# Patient Record
Sex: FEMALE | Race: WHITE | NOT HISPANIC OR LATINO
[De-identification: names, ages, dates, MRNs, and addresses within clinical notes are randomized per-mention and may not be internally consistent; named-entity substitution may affect disease eponyms.]

---

## 2016-09-15 RX ORDER — FUROSEMIDE 40 MG
0 TABLET ORAL
Qty: 0 | Refills: 0 | DISCHARGE
Start: 2016-09-15

## 2017-01-03 ENCOUNTER — APPOINTMENT (OUTPATIENT)
Dept: INTERNAL MEDICINE | Facility: CLINIC | Age: 77
End: 2017-01-03

## 2017-01-03 ENCOUNTER — LABORATORY RESULT (OUTPATIENT)
Age: 77
End: 2017-01-03

## 2017-01-03 ENCOUNTER — APPOINTMENT (OUTPATIENT)
Dept: CARDIOLOGY | Facility: CLINIC | Age: 77
End: 2017-01-03

## 2017-01-03 ENCOUNTER — NON-APPOINTMENT (OUTPATIENT)
Age: 77
End: 2017-01-03

## 2017-01-03 VITALS
OXYGEN SATURATION: 98 % | SYSTOLIC BLOOD PRESSURE: 100 MMHG | BODY MASS INDEX: 32.78 KG/M2 | HEIGHT: 66 IN | WEIGHT: 204 LBS | HEART RATE: 53 BPM | RESPIRATION RATE: 14 BRPM | DIASTOLIC BLOOD PRESSURE: 60 MMHG

## 2017-01-03 VITALS
DIASTOLIC BLOOD PRESSURE: 64 MMHG | HEART RATE: 52 BPM | OXYGEN SATURATION: 95 % | WEIGHT: 204.38 LBS | SYSTOLIC BLOOD PRESSURE: 103 MMHG | BODY MASS INDEX: 32.85 KG/M2 | HEIGHT: 66 IN

## 2017-01-03 RX ORDER — CLINDAMYCIN HYDROCHLORIDE 150 MG/1
150 CAPSULE ORAL
Qty: 28 | Refills: 0 | Status: DISCONTINUED | COMMUNITY
Start: 2016-12-30

## 2017-01-04 LAB
BASOPHILS # BLD AUTO: 0.12 K/UL
BASOPHILS NFR BLD AUTO: 1.8 %
EOSINOPHIL # BLD AUTO: 0 K/UL
EOSINOPHIL NFR BLD AUTO: 0 %
FOLATE SERPL-MCNC: >20 NG/ML
HCT VFR BLD CALC: 24.3 %
HGB BLD-MCNC: 7.6 G/DL
INR PPP: 2.2 RATIO
LYMPHOCYTES # BLD AUTO: 1.44 K/UL
LYMPHOCYTES NFR BLD AUTO: 21.2 %
MAN DIFF?: NORMAL
MCHC RBC-ENTMCNC: 31.3 GM/DL
MCHC RBC-ENTMCNC: 33 PG
MCV RBC AUTO: 105.7 FL
MONOCYTES # BLD AUTO: 0.24 K/UL
MONOCYTES NFR BLD AUTO: 3.5 %
NEUTROPHILS # BLD AUTO: 4.98 K/UL
NEUTROPHILS NFR BLD AUTO: 73.5 %
PLATELET # BLD AUTO: 309 K/UL
POCT-PROTHROMBIN TIME: 25 SECS
QUALITY CONTROL: YES
RBC # BLD: 2.3 M/UL
RBC # FLD: 22.4 %
VIT B12 SERPL-MCNC: 486 PG/ML
WBC # FLD AUTO: 6.78 K/UL

## 2017-01-05 RX ORDER — ACETAMINOPHEN 500 MG
650 TABLET ORAL ONCE
Qty: 0 | Refills: 0 | Status: COMPLETED | OUTPATIENT
Start: 2017-01-06 | End: 2017-01-06

## 2017-01-05 RX ORDER — DIPHENHYDRAMINE HCL 50 MG
25 CAPSULE ORAL ONCE
Qty: 0 | Refills: 0 | Status: COMPLETED | OUTPATIENT
Start: 2017-01-06 | End: 2017-01-06

## 2017-01-06 ENCOUNTER — OUTPATIENT (OUTPATIENT)
Dept: OUTPATIENT SERVICES | Facility: HOSPITAL | Age: 77
LOS: 1 days | End: 2017-01-06
Payer: MEDICARE

## 2017-01-06 DIAGNOSIS — Z98.89 OTHER SPECIFIED POSTPROCEDURAL STATES: Chronic | ICD-10-CM

## 2017-01-06 DIAGNOSIS — Z90.710 ACQUIRED ABSENCE OF BOTH CERVIX AND UTERUS: Chronic | ICD-10-CM

## 2017-01-06 DIAGNOSIS — D50.0 IRON DEFICIENCY ANEMIA SECONDARY TO BLOOD LOSS (CHRONIC): ICD-10-CM

## 2017-01-06 DIAGNOSIS — H26.40 UNSPECIFIED SECONDARY CATARACT: Chronic | ICD-10-CM

## 2017-01-06 LAB
BLD GP AB SCN SERPL QL: SIGNIFICANT CHANGE UP
HCT VFR BLD CALC: 22.7 % — LOW (ref 34.5–45)
HGB BLD-MCNC: 7.5 G/DL — LOW (ref 11.5–15.5)
MCHC RBC-ENTMCNC: 32.3 PG — SIGNIFICANT CHANGE UP (ref 27–34)
MCHC RBC-ENTMCNC: 32.9 GM/DL — SIGNIFICANT CHANGE UP (ref 32–36)
MCV RBC AUTO: 98.1 FL — SIGNIFICANT CHANGE UP (ref 80–100)
PLATELET # BLD AUTO: 235 K/UL — SIGNIFICANT CHANGE UP (ref 150–400)
RBC # BLD: 2.31 M/UL — LOW (ref 3.8–5.2)
RBC # FLD: 20.5 % — HIGH (ref 10.3–14.5)
WBC # BLD: 6.5 K/UL — SIGNIFICANT CHANGE UP (ref 3.8–10.5)
WBC # FLD AUTO: 6.5 K/UL — SIGNIFICANT CHANGE UP (ref 3.8–10.5)

## 2017-01-06 PROCEDURE — 86900 BLOOD TYPING SEROLOGIC ABO: CPT

## 2017-01-06 PROCEDURE — P9016: CPT

## 2017-01-06 PROCEDURE — 86920 COMPATIBILITY TEST SPIN: CPT

## 2017-01-06 PROCEDURE — 86850 RBC ANTIBODY SCREEN: CPT

## 2017-01-06 PROCEDURE — 86901 BLOOD TYPING SEROLOGIC RH(D): CPT

## 2017-01-06 PROCEDURE — 85027 COMPLETE CBC AUTOMATED: CPT

## 2017-01-06 PROCEDURE — 36430 TRANSFUSION BLD/BLD COMPNT: CPT

## 2017-01-06 RX ADMIN — Medication 25 MILLIGRAM(S): at 10:55

## 2017-01-06 RX ADMIN — Medication 650 MILLIGRAM(S): at 10:55

## 2017-01-09 ENCOUNTER — RECORD ABSTRACTING (OUTPATIENT)
Age: 77
End: 2017-01-09

## 2017-01-09 ENCOUNTER — OUTPATIENT (OUTPATIENT)
Dept: OUTPATIENT SERVICES | Facility: HOSPITAL | Age: 77
LOS: 1 days | End: 2017-01-09
Payer: MEDICARE

## 2017-01-09 DIAGNOSIS — Z98.89 OTHER SPECIFIED POSTPROCEDURAL STATES: Chronic | ICD-10-CM

## 2017-01-09 DIAGNOSIS — H26.40 UNSPECIFIED SECONDARY CATARACT: Chronic | ICD-10-CM

## 2017-01-09 DIAGNOSIS — Z90.710 ACQUIRED ABSENCE OF BOTH CERVIX AND UTERUS: Chronic | ICD-10-CM

## 2017-01-09 DIAGNOSIS — L89.612 PRESSURE ULCER OF RIGHT HEEL, STAGE 2: ICD-10-CM

## 2017-01-09 PROCEDURE — G0463: CPT

## 2017-01-09 RX ORDER — CEPHALEXIN 500 MG/1
500 CAPSULE ORAL
Qty: 30 | Refills: 0 | Status: DISCONTINUED | COMMUNITY
Start: 2016-12-31 | End: 2017-01-09

## 2017-01-09 RX ORDER — BACITRACIN 500 [USP'U]/G
500 OINTMENT OPHTHALMIC
Qty: 4 | Refills: 0 | Status: DISCONTINUED | COMMUNITY
Start: 2016-12-31 | End: 2017-01-09

## 2017-01-10 ENCOUNTER — MEDICATION RENEWAL (OUTPATIENT)
Age: 77
End: 2017-01-10

## 2017-01-11 DIAGNOSIS — E78.5 HYPERLIPIDEMIA, UNSPECIFIED: ICD-10-CM

## 2017-01-11 DIAGNOSIS — Z87.891 PERSONAL HISTORY OF NICOTINE DEPENDENCE: ICD-10-CM

## 2017-01-11 DIAGNOSIS — Z79.899 OTHER LONG TERM (CURRENT) DRUG THERAPY: ICD-10-CM
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
## 2017-01-11 DIAGNOSIS — Z80.0 FAMILY HISTORY OF MALIGNANT NEOPLASM OF DIGESTIVE ORGANS: ICD-10-CM

## 2017-01-11 DIAGNOSIS — Z79.82 LONG TERM (CURRENT) USE OF ASPIRIN: ICD-10-CM

## 2017-01-11 DIAGNOSIS — Z80.52 FAMILY HISTORY OF MALIGNANT NEOPLASM OF BLADDER: ICD-10-CM
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
## 2017-01-11 DIAGNOSIS — I25.10 ATHEROSCLEROTIC HEART DISEASE OF NATIVE CORONARY ARTERY WITHOUT ANGINA PECTORIS: ICD-10-CM

## 2017-01-11 DIAGNOSIS — Z98.41 CATARACT EXTRACTION STATUS, RIGHT EYE: ICD-10-CM
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
## 2017-01-11 DIAGNOSIS — K52.9 NONINFECTIVE GASTROENTERITIS AND COLITIS, UNSPECIFIED: ICD-10-CM

## 2017-01-11 DIAGNOSIS — I48.91 UNSPECIFIED ATRIAL FIBRILLATION: ICD-10-CM

## 2017-01-11 DIAGNOSIS — S81.811A LACERATION WITHOUT FOREIGN BODY, RIGHT LOWER LEG, INITIAL ENCOUNTER: ICD-10-CM

## 2017-01-11 DIAGNOSIS — J44.9 CHRONIC OBSTRUCTIVE PULMONARY DISEASE, UNSPECIFIED: ICD-10-CM

## 2017-01-11 DIAGNOSIS — E11.9 TYPE 2 DIABETES MELLITUS WITHOUT COMPLICATIONS: ICD-10-CM

## 2017-01-11 DIAGNOSIS — Z90.710 ACQUIRED ABSENCE OF BOTH CERVIX AND UTERUS: ICD-10-CM

## 2017-01-11 DIAGNOSIS — Z86.711 PERSONAL HISTORY OF PULMONARY EMBOLISM: ICD-10-CM

## 2017-01-11 DIAGNOSIS — Z98.1 ARTHRODESIS STATUS: ICD-10-CM

## 2017-01-11 DIAGNOSIS — E66.9 OBESITY, UNSPECIFIED: ICD-10-CM

## 2017-01-11 DIAGNOSIS — Z98.42 CATARACT EXTRACTION STATUS, LEFT EYE: ICD-10-CM

## 2017-01-11 DIAGNOSIS — Z86.718 PERSONAL HISTORY OF OTHER VENOUS THROMBOSIS AND EMBOLISM: ICD-10-CM

## 2017-01-11 DIAGNOSIS — K44.9 DIAPHRAGMATIC HERNIA WITHOUT OBSTRUCTION OR GANGRENE: ICD-10-CM

## 2017-01-11 DIAGNOSIS — H93.19 TINNITUS, UNSPECIFIED EAR: ICD-10-CM

## 2017-01-11 DIAGNOSIS — I89.0 LYMPHEDEMA, NOT ELSEWHERE CLASSIFIED: ICD-10-CM

## 2017-01-11 DIAGNOSIS — Z98.890 OTHER SPECIFIED POSTPROCEDURAL STATES: ICD-10-CM
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
## 2017-01-11 DIAGNOSIS — R60.9 EDEMA, UNSPECIFIED: ICD-10-CM

## 2017-01-11 DIAGNOSIS — L89.612 PRESSURE ULCER OF RIGHT HEEL, STAGE 2: ICD-10-CM

## 2017-01-16 ENCOUNTER — EMERGENCY (EMERGENCY)
Facility: HOSPITAL | Age: 77
LOS: 1 days | Discharge: HOME | End: 2017-01-16
Attending: EMERGENCY MEDICINE | Admitting: EMERGENCY MEDICINE
Payer: MEDICARE

## 2017-01-16 ENCOUNTER — APPOINTMENT (OUTPATIENT)
Dept: CARDIOLOGY | Facility: CLINIC | Age: 77
End: 2017-01-16

## 2017-01-16 ENCOUNTER — OUTPATIENT (OUTPATIENT)
Dept: OUTPATIENT SERVICES | Facility: HOSPITAL | Age: 77
LOS: 1 days | End: 2017-01-16
Payer: MEDICARE

## 2017-01-16 VITALS
WEIGHT: 195.11 LBS | RESPIRATION RATE: 18 BRPM | HEIGHT: 66 IN | DIASTOLIC BLOOD PRESSURE: 73 MMHG | SYSTOLIC BLOOD PRESSURE: 120 MMHG | HEART RATE: 49 BPM | OXYGEN SATURATION: 97 % | TEMPERATURE: 98 F

## 2017-01-16 VITALS
DIASTOLIC BLOOD PRESSURE: 74 MMHG | OXYGEN SATURATION: 98 % | TEMPERATURE: 98 F | RESPIRATION RATE: 16 BRPM | SYSTOLIC BLOOD PRESSURE: 122 MMHG | HEART RATE: 60 BPM

## 2017-01-16 DIAGNOSIS — Z98.89 OTHER SPECIFIED POSTPROCEDURAL STATES: Chronic | ICD-10-CM

## 2017-01-16 DIAGNOSIS — Z90.710 ACQUIRED ABSENCE OF BOTH CERVIX AND UTERUS: Chronic | ICD-10-CM

## 2017-01-16 DIAGNOSIS — R53.1 WEAKNESS: ICD-10-CM

## 2017-01-16 DIAGNOSIS — N28.9 DISORDER OF KIDNEY AND URETER, UNSPECIFIED: ICD-10-CM

## 2017-01-16 DIAGNOSIS — L89.612 PRESSURE ULCER OF RIGHT HEEL, STAGE 2: ICD-10-CM

## 2017-01-16 DIAGNOSIS — E11.9 TYPE 2 DIABETES MELLITUS WITHOUT COMPLICATIONS: ICD-10-CM

## 2017-01-16 DIAGNOSIS — J44.9 CHRONIC OBSTRUCTIVE PULMONARY DISEASE, UNSPECIFIED: ICD-10-CM

## 2017-01-16 DIAGNOSIS — H26.40 UNSPECIFIED SECONDARY CATARACT: Chronic | ICD-10-CM

## 2017-01-16 LAB
ALBUMIN SERPL ELPH-MCNC: 3.7 G/DL — SIGNIFICANT CHANGE UP (ref 3.3–5)
ALP SERPL-CCNC: 113 U/L — SIGNIFICANT CHANGE UP (ref 40–120)
ALT FLD-CCNC: 13 U/L — SIGNIFICANT CHANGE UP (ref 12–78)
AMYLASE P1 CFR SERPL: 52 U/L — SIGNIFICANT CHANGE UP (ref 25–115)
ANION GAP SERPL CALC-SCNC: 8 MMOL/L — SIGNIFICANT CHANGE UP (ref 5–17)
AST SERPL-CCNC: 10 U/L — LOW (ref 15–37)
BASOPHILS # BLD AUTO: 0.1 K/UL — SIGNIFICANT CHANGE UP (ref 0–0.2)
BASOPHILS NFR BLD AUTO: 2.1 % — HIGH (ref 0–2)
BILIRUB SERPL-MCNC: 0.4 MG/DL — SIGNIFICANT CHANGE UP (ref 0.2–1.2)
BUN SERPL-MCNC: 40 MG/DL — HIGH (ref 7–23)
CALCIUM SERPL-MCNC: 9.1 MG/DL — SIGNIFICANT CHANGE UP (ref 8.5–10.1)
CHLORIDE SERPL-SCNC: 107 MMOL/L — SIGNIFICANT CHANGE UP (ref 96–108)
CK MB CFR SERPL CALC: 0.8 NG/ML — SIGNIFICANT CHANGE UP (ref 0–3.6)
CO2 SERPL-SCNC: 25 MMOL/L — SIGNIFICANT CHANGE UP (ref 22–31)
CREAT SERPL-MCNC: 1.4 MG/DL — HIGH (ref 0.5–1.3)
EOSINOPHIL # BLD AUTO: 0 K/UL — SIGNIFICANT CHANGE UP (ref 0–0.5)
EOSINOPHIL NFR BLD AUTO: 0.6 % — SIGNIFICANT CHANGE UP (ref 0–6)
GLUCOSE SERPL-MCNC: 93 MG/DL — SIGNIFICANT CHANGE UP (ref 70–99)
HCT VFR BLD CALC: 31.2 % — LOW (ref 34.5–45)
HGB BLD-MCNC: 9.9 G/DL — LOW (ref 11.5–15.5)
INR PPP: 2.8 RATIO
LIDOCAIN IGE QN: 171 U/L — SIGNIFICANT CHANGE UP (ref 73–393)
LYMPHOCYTES # BLD AUTO: 1.8 K/UL — SIGNIFICANT CHANGE UP (ref 1–3.3)
LYMPHOCYTES # BLD AUTO: 28.4 % — SIGNIFICANT CHANGE UP (ref 13–44)
MCHC RBC-ENTMCNC: 31.5 PG — SIGNIFICANT CHANGE UP (ref 27–34)
MCHC RBC-ENTMCNC: 31.8 GM/DL — LOW (ref 32–36)
MCV RBC AUTO: 98.9 FL — SIGNIFICANT CHANGE UP (ref 80–100)
MONOCYTES # BLD AUTO: 0.7 K/UL — SIGNIFICANT CHANGE UP (ref 0–0.9)
MONOCYTES NFR BLD AUTO: 10.5 % — HIGH (ref 1–9)
NEUTROPHILS # BLD AUTO: 3.6 K/UL — SIGNIFICANT CHANGE UP (ref 1.8–7.4)
NEUTROPHILS NFR BLD AUTO: 58.5 % — SIGNIFICANT CHANGE UP (ref 43–77)
PLATELET # BLD AUTO: 211 K/UL — SIGNIFICANT CHANGE UP (ref 150–400)
POTASSIUM SERPL-MCNC: 4.7 MMOL/L — SIGNIFICANT CHANGE UP (ref 3.5–5.3)
POTASSIUM SERPL-SCNC: 4.7 MMOL/L — SIGNIFICANT CHANGE UP (ref 3.5–5.3)
PROT SERPL-MCNC: 6.5 G/DL — SIGNIFICANT CHANGE UP (ref 6–8.3)
RBC # BLD: 3.15 M/UL — LOW (ref 3.8–5.2)
RBC # FLD: 19.9 % — HIGH (ref 10.3–14.5)
SODIUM SERPL-SCNC: 140 MMOL/L — SIGNIFICANT CHANGE UP (ref 135–145)
TROPONIN I SERPL-MCNC: <.015 NG/ML — SIGNIFICANT CHANGE UP (ref 0.01–0.04)
WBC # BLD: 6.2 K/UL — SIGNIFICANT CHANGE UP (ref 3.8–10.5)
WBC # FLD AUTO: 6.2 K/UL — SIGNIFICANT CHANGE UP (ref 3.8–10.5)

## 2017-01-16 PROCEDURE — 71010: CPT | Mod: 26

## 2017-01-16 PROCEDURE — 99284 EMERGENCY DEPT VISIT MOD MDM: CPT

## 2017-01-16 PROCEDURE — 93010 ELECTROCARDIOGRAM REPORT: CPT

## 2017-01-16 PROCEDURE — 99285 EMERGENCY DEPT VISIT HI MDM: CPT

## 2017-01-16 NOTE — ED ADULT NURSE NOTE - PSH
After cataract  Left eye cataract surgically removed  S/P foot surgery    S/P hysterectomy    S/P knee surgery

## 2017-01-16 NOTE — ED PROVIDER NOTE - PLAN OF CARE
Return to the ED for any new or worsening symptoms  Take your medication as prescribed  Follow up with your PMD in 3-4 days   Follow up with Dr. Stout as scheduled

## 2017-01-16 NOTE — ED PROVIDER NOTE - CARE PLAN
Principal Discharge DX:	Weakness  Instructions for follow-up, activity and diet:	Return to the ED for any new or worsening symptoms  Take your medication as prescribed  Follow up with your PMD in 3-4 days   Follow up with Dr. Stout as scheduled  Secondary Diagnosis:	Renal insufficiency

## 2017-01-16 NOTE — ED PROVIDER NOTE - CHPI ED SYMPTOMS NEG
no vomiting/no dizziness/no fever/no cough/no diaphoresis/no nausea/no back pain/no chills/no syncope/no shortness of breath

## 2017-01-16 NOTE — ED PROVIDER NOTE - PMH
COPD (chronic obstructive pulmonary disease)    DM (diabetes mellitus)    GIB (gastrointestinal bleeding)    Hiatal hernia    PAF (paroxysmal atrial fibrillation)  s/p ablation  Pericarditis    Shoulder fracture, right

## 2017-01-16 NOTE — ED PROVIDER NOTE - OBJECTIVE STATEMENT
Pt is a 77 yo female who presents to the ED with a cc of weakness. Pt was at the wound care center for a chronic right lower ext wound. At the wound care center she was noted to have a low heart rate.  Pt was sent to the ED for further elv.  Reports that she has been experiencing lightheadedness since yesterday Pt is a 77 yo female who presents to the ED with a cc of weakness. Pt was at the wound care center for a chronic right lower ext wound. At the wound care center she was noted to have a low heart rate.  Pt was sent to the ED for further elv.  Reports that she has been experiencing lightheadedness since yesterday.  Denies fever, chills, N/V/D/C, CP, SOB, abd pain.  Pt does report mild exertional SOB but states that this is baseline for her

## 2017-01-17 DIAGNOSIS — Z98.1 ARTHRODESIS STATUS: ICD-10-CM

## 2017-01-17 DIAGNOSIS — R00.1 BRADYCARDIA, UNSPECIFIED: ICD-10-CM

## 2017-01-17 DIAGNOSIS — Z87.891 PERSONAL HISTORY OF NICOTINE DEPENDENCE: ICD-10-CM

## 2017-01-17 DIAGNOSIS — Z90.710 ACQUIRED ABSENCE OF BOTH CERVIX AND UTERUS: ICD-10-CM
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
## 2017-01-17 DIAGNOSIS — K44.9 DIAPHRAGMATIC HERNIA WITHOUT OBSTRUCTION OR GANGRENE: ICD-10-CM

## 2017-01-17 DIAGNOSIS — S81.811D LACERATION WITHOUT FOREIGN BODY, RIGHT LOWER LEG, SUBSEQUENT ENCOUNTER: ICD-10-CM

## 2017-01-17 DIAGNOSIS — Z79.899 OTHER LONG TERM (CURRENT) DRUG THERAPY: ICD-10-CM

## 2017-01-17 DIAGNOSIS — E78.5 HYPERLIPIDEMIA, UNSPECIFIED: ICD-10-CM

## 2017-01-17 DIAGNOSIS — Z98.890 OTHER SPECIFIED POSTPROCEDURAL STATES: ICD-10-CM

## 2017-01-17 DIAGNOSIS — H93.19 TINNITUS, UNSPECIFIED EAR: ICD-10-CM
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
## 2017-01-17 DIAGNOSIS — Z98.41 CATARACT EXTRACTION STATUS, RIGHT EYE: ICD-10-CM

## 2017-01-17 DIAGNOSIS — I89.0 LYMPHEDEMA, NOT ELSEWHERE CLASSIFIED: ICD-10-CM

## 2017-01-17 DIAGNOSIS — Z80.0 FAMILY HISTORY OF MALIGNANT NEOPLASM OF DIGESTIVE ORGANS: ICD-10-CM

## 2017-01-17 DIAGNOSIS — Z80.52 FAMILY HISTORY OF MALIGNANT NEOPLASM OF BLADDER: ICD-10-CM

## 2017-01-17 DIAGNOSIS — R60.9 EDEMA, UNSPECIFIED: ICD-10-CM

## 2017-01-17 DIAGNOSIS — E11.9 TYPE 2 DIABETES MELLITUS WITHOUT COMPLICATIONS: ICD-10-CM

## 2017-01-17 DIAGNOSIS — Z86.718 PERSONAL HISTORY OF OTHER VENOUS THROMBOSIS AND EMBOLISM: ICD-10-CM

## 2017-01-17 DIAGNOSIS — L89.612 PRESSURE ULCER OF RIGHT HEEL, STAGE 2: ICD-10-CM

## 2017-01-17 DIAGNOSIS — I48.91 UNSPECIFIED ATRIAL FIBRILLATION: ICD-10-CM

## 2017-01-17 DIAGNOSIS — Z98.42 CATARACT EXTRACTION STATUS, LEFT EYE: ICD-10-CM

## 2017-01-17 DIAGNOSIS — Z79.82 LONG TERM (CURRENT) USE OF ASPIRIN: ICD-10-CM

## 2017-01-17 DIAGNOSIS — Z86.711 PERSONAL HISTORY OF PULMONARY EMBOLISM: ICD-10-CM
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
## 2017-01-17 DIAGNOSIS — K52.9 NONINFECTIVE GASTROENTERITIS AND COLITIS, UNSPECIFIED: ICD-10-CM

## 2017-01-17 DIAGNOSIS — J44.9 CHRONIC OBSTRUCTIVE PULMONARY DISEASE, UNSPECIFIED: ICD-10-CM

## 2017-01-17 DIAGNOSIS — I25.10 ATHEROSCLEROTIC HEART DISEASE OF NATIVE CORONARY ARTERY WITHOUT ANGINA PECTORIS: ICD-10-CM

## 2017-01-17 DIAGNOSIS — E66.9 OBESITY, UNSPECIFIED: ICD-10-CM

## 2017-01-25 ENCOUNTER — OUTPATIENT (OUTPATIENT)
Dept: OUTPATIENT SERVICES | Facility: HOSPITAL | Age: 77
LOS: 1 days | End: 2017-01-25
Payer: MEDICARE

## 2017-01-25 DIAGNOSIS — Z98.89 OTHER SPECIFIED POSTPROCEDURAL STATES: Chronic | ICD-10-CM

## 2017-01-25 DIAGNOSIS — L89.612 PRESSURE ULCER OF RIGHT HEEL, STAGE 2: ICD-10-CM

## 2017-01-25 DIAGNOSIS — H26.40 UNSPECIFIED SECONDARY CATARACT: Chronic | ICD-10-CM

## 2017-01-25 DIAGNOSIS — Z90.710 ACQUIRED ABSENCE OF BOTH CERVIX AND UTERUS: Chronic | ICD-10-CM

## 2017-01-25 PROCEDURE — G0463: CPT

## 2017-01-28 DIAGNOSIS — Z80.0 FAMILY HISTORY OF MALIGNANT NEOPLASM OF DIGESTIVE ORGANS: ICD-10-CM

## 2017-01-28 DIAGNOSIS — I25.10 ATHEROSCLEROTIC HEART DISEASE OF NATIVE CORONARY ARTERY WITHOUT ANGINA PECTORIS: ICD-10-CM

## 2017-01-28 DIAGNOSIS — R60.9 EDEMA, UNSPECIFIED: ICD-10-CM

## 2017-01-28 DIAGNOSIS — Z98.890 OTHER SPECIFIED POSTPROCEDURAL STATES: ICD-10-CM

## 2017-01-28 DIAGNOSIS — E11.9 TYPE 2 DIABETES MELLITUS WITHOUT COMPLICATIONS: ICD-10-CM

## 2017-01-28 DIAGNOSIS — Z98.1 ARTHRODESIS STATUS: ICD-10-CM

## 2017-01-28 DIAGNOSIS — Z86.718 PERSONAL HISTORY OF OTHER VENOUS THROMBOSIS AND EMBOLISM: ICD-10-CM

## 2017-01-28 DIAGNOSIS — H93.19 TINNITUS, UNSPECIFIED EAR: ICD-10-CM

## 2017-01-28 DIAGNOSIS — Z86.711 PERSONAL HISTORY OF PULMONARY EMBOLISM: ICD-10-CM

## 2017-01-28 DIAGNOSIS — I48.91 UNSPECIFIED ATRIAL FIBRILLATION: ICD-10-CM

## 2017-01-28 DIAGNOSIS — L89.612 PRESSURE ULCER OF RIGHT HEEL, STAGE 2: ICD-10-CM
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
## 2017-01-28 DIAGNOSIS — E66.9 OBESITY, UNSPECIFIED: ICD-10-CM
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
## 2017-01-28 DIAGNOSIS — K44.9 DIAPHRAGMATIC HERNIA WITHOUT OBSTRUCTION OR GANGRENE: ICD-10-CM

## 2017-01-28 DIAGNOSIS — Z98.41 CATARACT EXTRACTION STATUS, RIGHT EYE: ICD-10-CM

## 2017-01-28 DIAGNOSIS — Z90.710 ACQUIRED ABSENCE OF BOTH CERVIX AND UTERUS: ICD-10-CM

## 2017-01-28 DIAGNOSIS — Z87.891 PERSONAL HISTORY OF NICOTINE DEPENDENCE: ICD-10-CM

## 2017-01-28 DIAGNOSIS — Z79.899 OTHER LONG TERM (CURRENT) DRUG THERAPY: ICD-10-CM

## 2017-01-28 DIAGNOSIS — E78.5 HYPERLIPIDEMIA, UNSPECIFIED: ICD-10-CM

## 2017-01-28 DIAGNOSIS — Z98.42 CATARACT EXTRACTION STATUS, LEFT EYE: ICD-10-CM

## 2017-01-28 DIAGNOSIS — I89.0 LYMPHEDEMA, NOT ELSEWHERE CLASSIFIED: ICD-10-CM

## 2017-01-28 DIAGNOSIS — Z79.82 LONG TERM (CURRENT) USE OF ASPIRIN: ICD-10-CM

## 2017-01-28 DIAGNOSIS — S81.811D LACERATION WITHOUT FOREIGN BODY, RIGHT LOWER LEG, SUBSEQUENT ENCOUNTER: ICD-10-CM
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
## 2017-01-28 DIAGNOSIS — J44.9 CHRONIC OBSTRUCTIVE PULMONARY DISEASE, UNSPECIFIED: ICD-10-CM

## 2017-01-28 DIAGNOSIS — Z80.52 FAMILY HISTORY OF MALIGNANT NEOPLASM OF BLADDER: ICD-10-CM

## 2017-01-28 DIAGNOSIS — K52.9 NONINFECTIVE GASTROENTERITIS AND COLITIS, UNSPECIFIED: ICD-10-CM

## 2017-01-31 ENCOUNTER — APPOINTMENT (OUTPATIENT)
Dept: INTERNAL MEDICINE | Facility: CLINIC | Age: 77
End: 2017-01-31

## 2017-01-31 VITALS
RESPIRATION RATE: 14 BRPM | TEMPERATURE: 98.1 F | DIASTOLIC BLOOD PRESSURE: 58 MMHG | SYSTOLIC BLOOD PRESSURE: 98 MMHG | OXYGEN SATURATION: 97 % | HEART RATE: 74 BPM | HEIGHT: 66 IN

## 2017-01-31 VITALS — SYSTOLIC BLOOD PRESSURE: 92 MMHG | DIASTOLIC BLOOD PRESSURE: 60 MMHG

## 2017-02-01 LAB
ALBUMIN SERPL ELPH-MCNC: 4.1 G/DL
ALP BLD-CCNC: 99 U/L
ALT SERPL-CCNC: 10 U/L
ANION GAP SERPL CALC-SCNC: 16 MMOL/L
AST SERPL-CCNC: 7 U/L
BILIRUB SERPL-MCNC: 0.4 MG/DL
BUN SERPL-MCNC: 35 MG/DL
CALCIUM SERPL-MCNC: 10.2 MG/DL
CHLORIDE SERPL-SCNC: 100 MMOL/L
CO2 SERPL-SCNC: 25 MMOL/L
CREAT SERPL-MCNC: 1.43 MG/DL
GLUCOSE SERPL-MCNC: 74 MG/DL
POTASSIUM SERPL-SCNC: 4.9 MMOL/L
PROT SERPL-MCNC: 6.2 G/DL
SODIUM SERPL-SCNC: 141 MMOL/L

## 2017-02-02 ENCOUNTER — APPOINTMENT (OUTPATIENT)
Dept: CARDIOLOGY | Facility: CLINIC | Age: 77
End: 2017-02-02

## 2017-02-02 ENCOUNTER — NON-APPOINTMENT (OUTPATIENT)
Age: 77
End: 2017-02-02

## 2017-02-02 VITALS
WEIGHT: 195 LBS | SYSTOLIC BLOOD PRESSURE: 107 MMHG | BODY MASS INDEX: 31.34 KG/M2 | HEART RATE: 47 BPM | DIASTOLIC BLOOD PRESSURE: 65 MMHG | HEIGHT: 66 IN | OXYGEN SATURATION: 99 %

## 2017-02-03 LAB — INR PPP: 3.2 RATIO

## 2017-02-14 ENCOUNTER — APPOINTMENT (OUTPATIENT)
Dept: INTERNAL MEDICINE | Facility: CLINIC | Age: 77
End: 2017-02-14

## 2017-02-14 ENCOUNTER — LABORATORY RESULT (OUTPATIENT)
Age: 77
End: 2017-02-14

## 2017-02-14 VITALS
OXYGEN SATURATION: 98 % | DIASTOLIC BLOOD PRESSURE: 50 MMHG | HEIGHT: 66 IN | TEMPERATURE: 98 F | SYSTOLIC BLOOD PRESSURE: 102 MMHG | RESPIRATION RATE: 14 BRPM | HEART RATE: 50 BPM

## 2017-02-14 LAB
INR PPP: 2.8 RATIO
POCT-PROTHROMBIN TIME: 18 SECS
QUALITY CONTROL: YES

## 2017-02-15 LAB
ANION GAP SERPL CALC-SCNC: 12 MMOL/L
BASOPHILS # BLD AUTO: 0.01 K/UL
BASOPHILS NFR BLD AUTO: 0.1 %
BUN SERPL-MCNC: 31 MG/DL
CALCIUM SERPL-MCNC: 9.7 MG/DL
CHLORIDE SERPL-SCNC: 101 MMOL/L
CO2 SERPL-SCNC: 26 MMOL/L
CREAT SERPL-MCNC: 1.33 MG/DL
EOSINOPHIL # BLD AUTO: 0.04 K/UL
EOSINOPHIL NFR BLD AUTO: 0.6 %
GLUCOSE SERPL-MCNC: 108 MG/DL
HCT VFR BLD CALC: 28.5 %
HGB BLD-MCNC: 8.8 G/DL
IMM GRANULOCYTES NFR BLD AUTO: 0.9 %
LYMPHOCYTES # BLD AUTO: 1.22 K/UL
LYMPHOCYTES NFR BLD AUTO: 17.5 %
MAN DIFF?: NORMAL
MCHC RBC-ENTMCNC: 30.9 GM/DL
MCHC RBC-ENTMCNC: 31.8 PG
MCV RBC AUTO: 102.9 FL
MONOCYTES # BLD AUTO: 0.82 K/UL
MONOCYTES NFR BLD AUTO: 11.7 %
NEUTROPHILS # BLD AUTO: 4.84 K/UL
NEUTROPHILS NFR BLD AUTO: 69.2 %
PLATELET # BLD AUTO: 225 K/UL
POTASSIUM SERPL-SCNC: 5.6 MMOL/L
RBC # BLD: 2.77 M/UL
RBC # FLD: 22 %
SODIUM SERPL-SCNC: 139 MMOL/L
WBC # FLD AUTO: 6.99 K/UL

## 2017-02-16 ENCOUNTER — APPOINTMENT (OUTPATIENT)
Dept: CARDIOLOGY | Facility: CLINIC | Age: 77
End: 2017-02-16

## 2017-02-17 ENCOUNTER — APPOINTMENT (OUTPATIENT)
Dept: CARDIOLOGY | Facility: CLINIC | Age: 77
End: 2017-02-17

## 2017-03-06 ENCOUNTER — MEDICATION RENEWAL (OUTPATIENT)
Age: 77
End: 2017-03-06

## 2017-03-06 ENCOUNTER — APPOINTMENT (OUTPATIENT)
Dept: CARDIOLOGY | Facility: CLINIC | Age: 77
End: 2017-03-06

## 2017-03-06 ENCOUNTER — NON-APPOINTMENT (OUTPATIENT)
Age: 77
End: 2017-03-06

## 2017-03-06 VITALS — BODY MASS INDEX: 32.14 KG/M2 | WEIGHT: 200 LBS | HEIGHT: 66 IN | HEART RATE: 56 BPM

## 2017-03-06 VITALS
HEIGHT: 66 IN | DIASTOLIC BLOOD PRESSURE: 63 MMHG | BODY MASS INDEX: 32.14 KG/M2 | WEIGHT: 200 LBS | HEART RATE: 56 BPM | OXYGEN SATURATION: 69 % | SYSTOLIC BLOOD PRESSURE: 123 MMHG

## 2017-03-06 DIAGNOSIS — R00.1 BRADYCARDIA, UNSPECIFIED: ICD-10-CM

## 2017-03-06 LAB
INR PPP: 2.1 RATIO
QUALITY CONTROL: YES

## 2017-03-09 ENCOUNTER — MEDICATION RENEWAL (OUTPATIENT)
Age: 77
End: 2017-03-09

## 2017-03-13 ENCOUNTER — APPOINTMENT (OUTPATIENT)
Dept: CARDIOLOGY | Facility: CLINIC | Age: 77
End: 2017-03-13

## 2017-03-13 VITALS — WEIGHT: 200 LBS | BODY MASS INDEX: 32.14 KG/M2 | HEIGHT: 66 IN | HEART RATE: 56 BPM

## 2017-03-13 LAB
INR PPP: 2.4 RATIO
QUALITY CONTROL: YES

## 2017-04-13 ENCOUNTER — APPOINTMENT (OUTPATIENT)
Dept: CARDIOLOGY | Facility: CLINIC | Age: 77
End: 2017-04-13

## 2017-04-13 ENCOUNTER — NON-APPOINTMENT (OUTPATIENT)
Age: 77
End: 2017-04-13

## 2017-04-13 VITALS
SYSTOLIC BLOOD PRESSURE: 131 MMHG | HEART RATE: 72 BPM | OXYGEN SATURATION: 95 % | DIASTOLIC BLOOD PRESSURE: 71 MMHG | HEIGHT: 66 IN

## 2017-04-13 VITALS — WEIGHT: 200 LBS | HEART RATE: 72 BPM | HEIGHT: 66 IN | BODY MASS INDEX: 32.14 KG/M2

## 2017-04-13 DIAGNOSIS — R06.02 SHORTNESS OF BREATH: ICD-10-CM

## 2017-04-13 LAB
INR PPP: 1.7 RATIO
QUALITY CONTROL: YES

## 2017-04-13 PROCEDURE — G0463: CPT

## 2017-04-13 PROCEDURE — 83690 ASSAY OF LIPASE: CPT

## 2017-04-13 PROCEDURE — 80053 COMPREHEN METABOLIC PANEL: CPT

## 2017-04-13 PROCEDURE — 82553 CREATINE MB FRACTION: CPT

## 2017-04-13 PROCEDURE — 84484 ASSAY OF TROPONIN QUANT: CPT

## 2017-04-13 PROCEDURE — 71045 X-RAY EXAM CHEST 1 VIEW: CPT

## 2017-04-13 PROCEDURE — 99284 EMERGENCY DEPT VISIT MOD MDM: CPT | Mod: 25

## 2017-04-13 PROCEDURE — 85027 COMPLETE CBC AUTOMATED: CPT

## 2017-04-13 PROCEDURE — 93005 ELECTROCARDIOGRAM TRACING: CPT

## 2017-04-13 PROCEDURE — 82150 ASSAY OF AMYLASE: CPT

## 2017-04-27 ENCOUNTER — APPOINTMENT (OUTPATIENT)
Dept: CARDIOLOGY | Facility: CLINIC | Age: 77
End: 2017-04-27

## 2017-04-27 VITALS — BODY MASS INDEX: 32.14 KG/M2 | HEART RATE: 72 BPM | HEIGHT: 66 IN | WEIGHT: 200 LBS

## 2017-04-27 LAB
INR PPP: 2.2 RATIO
QUALITY CONTROL: YES

## 2017-05-18 ENCOUNTER — APPOINTMENT (OUTPATIENT)
Dept: CARDIOLOGY | Facility: CLINIC | Age: 77
End: 2017-05-18

## 2017-05-18 VITALS — WEIGHT: 200 LBS | HEART RATE: 72 BPM | HEIGHT: 66 IN | BODY MASS INDEX: 32.14 KG/M2

## 2017-05-18 LAB
INR PPP: 2.6 RATIO
QUALITY CONTROL: YES

## 2017-05-25 ENCOUNTER — MEDICATION RENEWAL (OUTPATIENT)
Age: 77
End: 2017-05-25

## 2017-06-14 ENCOUNTER — APPOINTMENT (OUTPATIENT)
Dept: CARDIOLOGY | Facility: CLINIC | Age: 77
End: 2017-06-14

## 2017-06-14 ENCOUNTER — NON-APPOINTMENT (OUTPATIENT)
Age: 77
End: 2017-06-14

## 2017-06-14 VITALS
SYSTOLIC BLOOD PRESSURE: 149 MMHG | OXYGEN SATURATION: 96 % | BODY MASS INDEX: 31.82 KG/M2 | HEIGHT: 66 IN | WEIGHT: 198 LBS | DIASTOLIC BLOOD PRESSURE: 79 MMHG | HEART RATE: 72 BPM

## 2017-06-14 VITALS — HEART RATE: 72 BPM | BODY MASS INDEX: 31.82 KG/M2 | HEIGHT: 66 IN | WEIGHT: 198 LBS

## 2017-06-14 LAB
INR PPP: 2.2 RATIO
QUALITY CONTROL: YES

## 2017-06-14 RX ORDER — METOPROLOL TARTRATE 50 MG/1
50 TABLET, FILM COATED ORAL
Qty: 60 | Refills: 0 | Status: DISCONTINUED | COMMUNITY
Start: 2016-12-22

## 2017-06-14 RX ORDER — MUPIROCIN 20 MG/G
2 OINTMENT TOPICAL
Qty: 22 | Refills: 0 | Status: COMPLETED | COMMUNITY
Start: 2017-01-09

## 2017-06-15 ENCOUNTER — APPOINTMENT (OUTPATIENT)
Dept: CARDIOLOGY | Facility: CLINIC | Age: 77
End: 2017-06-15

## 2017-06-19 ENCOUNTER — OUTPATIENT (OUTPATIENT)
Dept: OUTPATIENT SERVICES | Facility: HOSPITAL | Age: 77
LOS: 1 days | End: 2017-06-19
Payer: MEDICARE

## 2017-06-19 ENCOUNTER — APPOINTMENT (OUTPATIENT)
Dept: CARDIOLOGY | Facility: CLINIC | Age: 77
End: 2017-06-19

## 2017-06-19 VITALS
DIASTOLIC BLOOD PRESSURE: 63 MMHG | WEIGHT: 195.11 LBS | TEMPERATURE: 98 F | SYSTOLIC BLOOD PRESSURE: 129 MMHG | HEART RATE: 63 BPM | RESPIRATION RATE: 16 BRPM | HEIGHT: 66 IN

## 2017-06-19 DIAGNOSIS — Z01.818 ENCOUNTER FOR OTHER PREPROCEDURAL EXAMINATION: ICD-10-CM

## 2017-06-19 DIAGNOSIS — G56.01 CARPAL TUNNEL SYNDROME, RIGHT UPPER LIMB: ICD-10-CM

## 2017-06-19 DIAGNOSIS — Z90.710 ACQUIRED ABSENCE OF BOTH CERVIX AND UTERUS: Chronic | ICD-10-CM

## 2017-06-19 DIAGNOSIS — Z95.828 PRESENCE OF OTHER VASCULAR IMPLANTS AND GRAFTS: Chronic | ICD-10-CM

## 2017-06-19 DIAGNOSIS — Z98.89 OTHER SPECIFIED POSTPROCEDURAL STATES: Chronic | ICD-10-CM

## 2017-06-19 DIAGNOSIS — H26.40 UNSPECIFIED SECONDARY CATARACT: Chronic | ICD-10-CM

## 2017-06-19 DIAGNOSIS — I82.409 ACUTE EMBOLISM AND THROMBOSIS OF UNSPECIFIED DEEP VEINS OF UNSPECIFIED LOWER EXTREMITY: ICD-10-CM

## 2017-06-19 DIAGNOSIS — S72.001A FRACTURE OF UNSPECIFIED PART OF NECK OF RIGHT FEMUR, INITIAL ENCOUNTER FOR CLOSED FRACTURE: Chronic | ICD-10-CM

## 2017-06-19 LAB
ANION GAP SERPL CALC-SCNC: 7 MMOL/L — SIGNIFICANT CHANGE UP (ref 5–17)
APTT BLD: 37.5 SEC — HIGH (ref 27.5–37.4)
BUN SERPL-MCNC: 28 MG/DL — HIGH (ref 7–23)
CALCIUM SERPL-MCNC: 9.6 MG/DL — SIGNIFICANT CHANGE UP (ref 8.5–10.1)
CHLORIDE SERPL-SCNC: 106 MMOL/L — SIGNIFICANT CHANGE UP (ref 96–108)
CO2 SERPL-SCNC: 27 MMOL/L — SIGNIFICANT CHANGE UP (ref 22–31)
CREAT SERPL-MCNC: 1.3 MG/DL — SIGNIFICANT CHANGE UP (ref 0.5–1.3)
GLUCOSE SERPL-MCNC: 102 MG/DL — HIGH (ref 70–99)
HBA1C BLD-MCNC: 5.4 % — SIGNIFICANT CHANGE UP (ref 4–5.6)
HCT VFR BLD CALC: 30.7 % — LOW (ref 34.5–45)
HGB BLD-MCNC: 9.7 G/DL — LOW (ref 11.5–15.5)
INR BLD: 2.16 RATIO — HIGH (ref 0.88–1.16)
MCHC RBC-ENTMCNC: 31.5 GM/DL — LOW (ref 32–36)
MCHC RBC-ENTMCNC: 32.2 PG — SIGNIFICANT CHANGE UP (ref 27–34)
MCV RBC AUTO: 102 FL — HIGH (ref 80–100)
PLATELET # BLD AUTO: 254 K/UL — SIGNIFICANT CHANGE UP (ref 150–400)
POTASSIUM SERPL-MCNC: 4.7 MMOL/L — SIGNIFICANT CHANGE UP (ref 3.5–5.3)
POTASSIUM SERPL-SCNC: 4.7 MMOL/L — SIGNIFICANT CHANGE UP (ref 3.5–5.3)
PROTHROM AB SERPL-ACNC: 23.9 SEC — HIGH (ref 9.8–12.7)
RBC # BLD: 3.01 M/UL — LOW (ref 3.8–5.2)
RBC # FLD: 20.6 % — HIGH (ref 10.3–14.5)
SODIUM SERPL-SCNC: 140 MMOL/L — SIGNIFICANT CHANGE UP (ref 135–145)
WBC # BLD: 6 K/UL — SIGNIFICANT CHANGE UP (ref 3.8–10.5)
WBC # FLD AUTO: 6 K/UL — SIGNIFICANT CHANGE UP (ref 3.8–10.5)

## 2017-06-19 PROCEDURE — 93010 ELECTROCARDIOGRAM REPORT: CPT | Mod: NC

## 2017-06-19 PROCEDURE — G0463: CPT

## 2017-06-19 PROCEDURE — 85610 PROTHROMBIN TIME: CPT

## 2017-06-19 PROCEDURE — 85730 THROMBOPLASTIN TIME PARTIAL: CPT

## 2017-06-19 PROCEDURE — 83036 HEMOGLOBIN GLYCOSYLATED A1C: CPT

## 2017-06-19 PROCEDURE — 93005 ELECTROCARDIOGRAM TRACING: CPT

## 2017-06-19 PROCEDURE — 80048 BASIC METABOLIC PNL TOTAL CA: CPT

## 2017-06-19 PROCEDURE — 85027 COMPLETE CBC AUTOMATED: CPT

## 2017-06-19 NOTE — H&P PST ADULT - PMH
COPD (chronic obstructive pulmonary disease)    DM (diabetes mellitus)    GIB (gastrointestinal bleeding)    Hiatal hernia    PAF (paroxysmal atrial fibrillation)  s/p ablation  Pericarditis    Pulmonary fibrosis    Shoulder fracture, right

## 2017-06-19 NOTE — H&P PST ADULT - MUSCULOSKELETAL COMMENTS
right hip pain, at times s/p ORIF in Nov 2016 no tenderness , pain on movement/flexion of the right wrist, previous carpal tunnel release scar

## 2017-06-19 NOTE — H&P PST ADULT - GASTROINTESTINAL COMMENTS
developed complications after the fall , had bowel obstruction, was on feeding tube, was admitted in the hospital for about 3 months in connection to right hip fracture , ORIF and complications.

## 2017-06-19 NOTE — H&P PST ADULT - PSH
After cataract  Left eye cataract surgically removed  Closed right hip fracture  rigth hip ORIF july 19 2016  S/P foot surgery    S/P hysterectomy    S/P IVC filter  nov 2016  S/P knee surgery After cataract  bilateral eye cataract surgically removed  Closed right hip fracture  rigth hip ORIF july 19 2016  S/P foot surgery    S/P hysterectomy    S/P IVC filter  nov 2016  S/P knee surgery

## 2017-06-19 NOTE — H&P PST ADULT - FAMILY HISTORY
Mother  Still living? Unknown  Family history of bladder cancer, Age at diagnosis: Age Unknown     Father  Still living? No  Family history of myocardial infarction, Age at diagnosis: Age Unknown

## 2017-06-19 NOTE — H&P PST ADULT - HISTORY OF PRESENT ILLNESS
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 Had Right carpal tunnel in 2008, continues to have pain in the right wrist, now scheduled fro revision of the right carpal tunnel. 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   Now in PST with c/o right wrist pain. Had Right carpal tunnel in 2008, started to have pain in the right wrist for over 6 months, scheduled for revision of the right carpal tunnel.

## 2017-06-19 NOTE — H&P PST ADULT - ASSESSMENT
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  In PST with right carpal tunnel, scheduled for right carpal tunnel release. Pre op testing today. Medical and cardiac eval advised.

## 2017-06-19 NOTE — H&P PST ADULT - NSANTHOSAYNRD_GEN_A_CORE
No. YONNY screening performed.  STOP BANG Legend: 0-2 = LOW Risk; 3-4 = INTERMEDIATE Risk; 5-8 = HIGH Risk

## 2017-06-20 ENCOUNTER — APPOINTMENT (OUTPATIENT)
Dept: INTERNAL MEDICINE | Facility: CLINIC | Age: 77
End: 2017-06-20

## 2017-06-20 VITALS
HEART RATE: 98 BPM | TEMPERATURE: 98.3 F | SYSTOLIC BLOOD PRESSURE: 102 MMHG | HEIGHT: 66 IN | RESPIRATION RATE: 14 BRPM | DIASTOLIC BLOOD PRESSURE: 80 MMHG | OXYGEN SATURATION: 95 %

## 2017-06-20 DIAGNOSIS — Z01.810 ENCOUNTER FOR PREPROCEDURAL CARDIOVASCULAR EXAMINATION: ICD-10-CM

## 2017-06-20 LAB — HBA1C MFR BLD HPLC: 5.2 %

## 2017-06-21 ENCOUNTER — APPOINTMENT (OUTPATIENT)
Dept: CARDIOLOGY | Facility: CLINIC | Age: 77
End: 2017-06-21

## 2017-06-21 LAB — INR PPP: 2.16

## 2017-06-23 ENCOUNTER — APPOINTMENT (OUTPATIENT)
Dept: CARDIOLOGY | Facility: CLINIC | Age: 77
End: 2017-06-23

## 2017-06-23 VITALS — HEIGHT: 66 IN | WEIGHT: 198 LBS | HEART RATE: 98 BPM | BODY MASS INDEX: 31.82 KG/M2

## 2017-06-23 LAB
INR PPP: 1.3 RATIO
QUALITY CONTROL: YES

## 2017-06-26 RX ORDER — SODIUM CHLORIDE 9 MG/ML
1000 INJECTION, SOLUTION INTRAVENOUS
Qty: 0 | Refills: 0 | Status: DISCONTINUED | OUTPATIENT
Start: 2017-06-27 | End: 2017-06-27

## 2017-06-27 ENCOUNTER — OUTPATIENT (OUTPATIENT)
Dept: OUTPATIENT SERVICES | Facility: HOSPITAL | Age: 77
LOS: 1 days | Discharge: HOME | End: 2017-06-27
Payer: MEDICARE

## 2017-06-27 ENCOUNTER — TRANSCRIPTION ENCOUNTER (OUTPATIENT)
Age: 77
End: 2017-06-27

## 2017-06-27 VITALS
HEIGHT: 66 IN | WEIGHT: 195.11 LBS | HEART RATE: 63 BPM | SYSTOLIC BLOOD PRESSURE: 119 MMHG | RESPIRATION RATE: 14 BRPM | OXYGEN SATURATION: 97 % | DIASTOLIC BLOOD PRESSURE: 48 MMHG | TEMPERATURE: 99 F

## 2017-06-27 VITALS
OXYGEN SATURATION: 96 % | HEART RATE: 60 BPM | RESPIRATION RATE: 12 BRPM | DIASTOLIC BLOOD PRESSURE: 54 MMHG | SYSTOLIC BLOOD PRESSURE: 110 MMHG | TEMPERATURE: 98 F

## 2017-06-27 DIAGNOSIS — Z98.89 OTHER SPECIFIED POSTPROCEDURAL STATES: Chronic | ICD-10-CM

## 2017-06-27 DIAGNOSIS — G56.01 CARPAL TUNNEL SYNDROME, RIGHT UPPER LIMB: ICD-10-CM

## 2017-06-27 DIAGNOSIS — S72.001A FRACTURE OF UNSPECIFIED PART OF NECK OF RIGHT FEMUR, INITIAL ENCOUNTER FOR CLOSED FRACTURE: Chronic | ICD-10-CM

## 2017-06-27 DIAGNOSIS — H26.40 UNSPECIFIED SECONDARY CATARACT: Chronic | ICD-10-CM

## 2017-06-27 DIAGNOSIS — Z90.710 ACQUIRED ABSENCE OF BOTH CERVIX AND UTERUS: Chronic | ICD-10-CM

## 2017-06-27 DIAGNOSIS — Z95.828 PRESENCE OF OTHER VASCULAR IMPLANTS AND GRAFTS: Chronic | ICD-10-CM

## 2017-06-27 LAB
APTT BLD: 38.3 SEC — HIGH (ref 27.5–37.4)
INR BLD: 1.17 RATIO — HIGH (ref 0.88–1.16)
PROTHROM AB SERPL-ACNC: 12.8 SEC — HIGH (ref 9.8–12.7)

## 2017-06-27 PROCEDURE — 36415 COLL VENOUS BLD VENIPUNCTURE: CPT

## 2017-06-27 PROCEDURE — 64721 CARPAL TUNNEL SURGERY: CPT | Mod: RT

## 2017-06-27 PROCEDURE — 85610 PROTHROMBIN TIME: CPT

## 2017-06-27 PROCEDURE — 85730 THROMBOPLASTIN TIME PARTIAL: CPT

## 2017-06-27 RX ORDER — OXYCODONE HYDROCHLORIDE 5 MG/1
5 TABLET ORAL EVERY 4 HOURS
Qty: 0 | Refills: 0 | Status: DISCONTINUED | OUTPATIENT
Start: 2017-06-27 | End: 2017-06-27

## 2017-06-27 RX ORDER — SODIUM CHLORIDE 9 MG/ML
1000 INJECTION, SOLUTION INTRAVENOUS
Qty: 0 | Refills: 0 | Status: DISCONTINUED | OUTPATIENT
Start: 2017-06-27 | End: 2017-06-27

## 2017-06-27 RX ORDER — ONDANSETRON 8 MG/1
4 TABLET, FILM COATED ORAL ONCE
Qty: 0 | Refills: 0 | Status: DISCONTINUED | OUTPATIENT
Start: 2017-06-27 | End: 2017-06-27

## 2017-06-27 RX ORDER — HYDROMORPHONE HYDROCHLORIDE 2 MG/ML
0.5 INJECTION INTRAMUSCULAR; INTRAVENOUS; SUBCUTANEOUS
Qty: 0 | Refills: 0 | Status: DISCONTINUED | OUTPATIENT
Start: 2017-06-27 | End: 2017-06-27

## 2017-06-27 RX ORDER — CEFAZOLIN SODIUM 1 G
2000 VIAL (EA) INJECTION ONCE
Qty: 0 | Refills: 0 | Status: COMPLETED | OUTPATIENT
Start: 2017-06-27 | End: 2017-06-27

## 2017-06-27 RX ADMIN — SODIUM CHLORIDE 75 MILLILITER(S): 9 INJECTION, SOLUTION INTRAVENOUS at 09:30

## 2017-06-27 RX ADMIN — HYDROMORPHONE HYDROCHLORIDE 0.5 MILLIGRAM(S): 2 INJECTION INTRAMUSCULAR; INTRAVENOUS; SUBCUTANEOUS at 09:47

## 2017-06-27 RX ADMIN — SODIUM CHLORIDE 60 MILLILITER(S): 9 INJECTION, SOLUTION INTRAVENOUS at 07:31

## 2017-06-27 RX ADMIN — HYDROMORPHONE HYDROCHLORIDE 0.5 MILLIGRAM(S): 2 INJECTION INTRAMUSCULAR; INTRAVENOUS; SUBCUTANEOUS at 09:32

## 2017-06-27 NOTE — ASU DISCHARGE PLAN (ADULT/PEDIATRIC). - ACTIVITY LEVEL
no heavy lifting/ice, active movement of fingers encouraged/elevate extremity/weight bearing as tolerated

## 2017-06-27 NOTE — ASU DISCHARGE PLAN (ADULT/PEDIATRIC). - MEDICATION SUMMARY - MEDICATIONS TO TAKE
I will START or STAY ON the medications listed below when I get home from the hospital:    Vicodin 5 mg-300 mg oral tablet  -- 1 tab(s) by mouth every 6 hours, As Needed for pain  -- Indication: For prn pain    amiodarone 100 mg oral tablet  --  by mouth   -- Indication: For per pmd    Lovenox 100 mg/mL injectable solution  --  injectable 2 times a day  -- Indication: For per pmd    Coumadin 3 mg oral tablet  -- 1 tab(s) by mouth once a day   4mg 5times /week and 6 mg 2times /week  -- Indication: For per pmd    simvastatin 20 mg oral tablet  -- 1 tab(s) by mouth once a day (at bedtime)  -- Indication: For per pmd    Spiriva 18 mcg inhalation capsule  -- 1 each inhaled once a day  -- Indication: For per pmd    Keflex 500 mg oral capsule  -- 1 cap(s) by mouth 4 times a day x3 days  -- Indication: For infxn ppx    furosemide 20 mg oral tablet  --  by mouth twice daily  -- Indication: For per pmd    Bentyl 10 mg oral capsule  -- 2 cap(s) by mouth 4 times a day, As Needed  -- Indication: For per pmd    ferrous sulfate (as elemental iron) 45 mg oral tablet, extended release  -- 1 tab(s) by mouth once a day  -- Indication: For per pmd    Singulair 10 mg oral tablet  -- 1 tab(s) by mouth once a day  -- Indication: For per pmd    fluticasone 27.5 mcg/inh nasal spray  -- 1 spray(s) into nose once a day  -- Indication: For per pmd    Prevacid 15 mg oral delayed release capsule  -- 1 cap(s) by mouth once a day  -- Indication: For per pmd    ascorbic acid 500 mg oral tablet  -- 1 tab(s) by mouth 2 times a day  -- Indication: For per pmd    folic acid 1 mg oral tablet  -- 1 tab(s) by mouth once a day  -- Indication: For per pmd    Vitamin D3 1000 intl units oral capsule  -- 1 cap(s) by mouth once a day  -- Indication: For per pmd

## 2017-06-27 NOTE — ASU PATIENT PROFILE, ADULT - PSH
After cataract  bilateral eye cataract surgically removed  Closed right hip fracture  rigth hip ORIF july 19 2016  S/P foot surgery    S/P hysterectomy    S/P IVC filter  nov 2016  S/P knee surgery

## 2017-06-27 NOTE — ASU DISCHARGE PLAN (ADULT/PEDIATRIC). - NOTIFY
Swelling that continues/Pain not relieved by Medications/Numbness, color, or temperature change to extremity/Numbness, tingling/Bleeding that does not stop/Fever greater than 101

## 2017-06-30 ENCOUNTER — APPOINTMENT (OUTPATIENT)
Dept: CARDIOLOGY | Facility: CLINIC | Age: 77
End: 2017-06-30

## 2017-07-01 LAB — INR PPP: 1.2 RATIO

## 2017-07-03 ENCOUNTER — APPOINTMENT (OUTPATIENT)
Dept: CARDIOLOGY | Facility: CLINIC | Age: 77
End: 2017-07-03

## 2017-07-03 LAB — INR PPP: 1.3 RATIO

## 2017-07-04 DIAGNOSIS — I34.1 NONRHEUMATIC MITRAL (VALVE) PROLAPSE: ICD-10-CM

## 2017-07-04 DIAGNOSIS — G56.01 CARPAL TUNNEL SYNDROME, RIGHT UPPER LIMB: ICD-10-CM

## 2017-07-04 DIAGNOSIS — Z80.0 FAMILY HISTORY OF MALIGNANT NEOPLASM OF DIGESTIVE ORGANS: ICD-10-CM

## 2017-07-04 DIAGNOSIS — Z79.82 LONG TERM (CURRENT) USE OF ASPIRIN: ICD-10-CM

## 2017-07-04 DIAGNOSIS — J44.9 CHRONIC OBSTRUCTIVE PULMONARY DISEASE, UNSPECIFIED: ICD-10-CM

## 2017-07-04 DIAGNOSIS — J84.10 PULMONARY FIBROSIS, UNSPECIFIED: ICD-10-CM

## 2017-07-04 DIAGNOSIS — Z80.52 FAMILY HISTORY OF MALIGNANT NEOPLASM OF BLADDER: ICD-10-CM

## 2017-07-04 DIAGNOSIS — I10 ESSENTIAL (PRIMARY) HYPERTENSION: ICD-10-CM

## 2017-07-04 DIAGNOSIS — Z87.891 PERSONAL HISTORY OF NICOTINE DEPENDENCE: ICD-10-CM

## 2017-07-04 DIAGNOSIS — E11.9 TYPE 2 DIABETES MELLITUS WITHOUT COMPLICATIONS: ICD-10-CM

## 2017-07-05 ENCOUNTER — MEDICATION RENEWAL (OUTPATIENT)
Age: 77
End: 2017-07-05

## 2017-07-06 ENCOUNTER — APPOINTMENT (OUTPATIENT)
Dept: CARDIOLOGY | Facility: CLINIC | Age: 77
End: 2017-07-06

## 2017-07-06 LAB — INR PPP: 1.4 RATIO

## 2017-07-10 ENCOUNTER — APPOINTMENT (OUTPATIENT)
Dept: CARDIOLOGY | Facility: CLINIC | Age: 77
End: 2017-07-10

## 2017-07-10 VITALS — HEIGHT: 66 IN | HEART RATE: 98 BPM | BODY MASS INDEX: 31.82 KG/M2 | WEIGHT: 198 LBS

## 2017-07-11 ENCOUNTER — LABORATORY RESULT (OUTPATIENT)
Age: 77
End: 2017-07-11

## 2017-07-11 ENCOUNTER — APPOINTMENT (OUTPATIENT)
Dept: INTERNAL MEDICINE | Facility: CLINIC | Age: 77
End: 2017-07-11

## 2017-07-11 VITALS
HEART RATE: 71 BPM | OXYGEN SATURATION: 97 % | SYSTOLIC BLOOD PRESSURE: 120 MMHG | RESPIRATION RATE: 14 BRPM | TEMPERATURE: 98.4 F | DIASTOLIC BLOOD PRESSURE: 78 MMHG | HEIGHT: 66 IN | WEIGHT: 190 LBS | BODY MASS INDEX: 30.53 KG/M2

## 2017-07-11 LAB
INR PPP: 1.6 RATIO
INR PPP: 1.6 RATIO
POCT-PROTHROMBIN TIME: 19.1 SECS
QUALITY CONTROL: YES
QUALITY CONTROL: YES

## 2017-07-12 LAB
ALBUMIN SERPL ELPH-MCNC: 4.5 G/DL
ALP BLD-CCNC: 85 U/L
ALT SERPL-CCNC: 10 U/L
ANION GAP SERPL CALC-SCNC: 19 MMOL/L
AST SERPL-CCNC: 14 U/L
BASOPHILS # BLD AUTO: 0 K/UL
BASOPHILS NFR BLD AUTO: 0 %
BILIRUB SERPL-MCNC: 0.4 MG/DL
BUN SERPL-MCNC: 26 MG/DL
CALCIUM SERPL-MCNC: 10.9 MG/DL
CHLORIDE SERPL-SCNC: 104 MMOL/L
CO2 SERPL-SCNC: 21 MMOL/L
CREAT SERPL-MCNC: 1.47 MG/DL
EOSINOPHIL # BLD AUTO: 0 K/UL
EOSINOPHIL NFR BLD AUTO: 0 %
GLUCOSE SERPL-MCNC: 85 MG/DL
HCT VFR BLD CALC: 30.9 %
HGB BLD-MCNC: 9.3 G/DL
LYMPHOCYTES # BLD AUTO: 1.4 K/UL
LYMPHOCYTES NFR BLD AUTO: 18.3 %
MAN DIFF?: NORMAL
MCHC RBC-ENTMCNC: 30.1 GM/DL
MCHC RBC-ENTMCNC: 31.8 PG
MCV RBC AUTO: 105.8 FL
MONOCYTES # BLD AUTO: 0.6 K/UL
MONOCYTES NFR BLD AUTO: 7.8 %
NEUTROPHILS # BLD AUTO: 5.65 K/UL
NEUTROPHILS NFR BLD AUTO: 73.9 %
PLATELET # BLD AUTO: 317 K/UL
POTASSIUM SERPL-SCNC: 5.9 MMOL/L
PROT SERPL-MCNC: 6.7 G/DL
RBC # BLD: 2.92 M/UL
RBC # FLD: 24.3 %
SODIUM SERPL-SCNC: 144 MMOL/L
WBC # FLD AUTO: 7.64 K/UL

## 2017-07-13 ENCOUNTER — APPOINTMENT (OUTPATIENT)
Dept: CARDIOLOGY | Facility: CLINIC | Age: 77
End: 2017-07-13

## 2017-07-13 VITALS — HEART RATE: 71 BPM | WEIGHT: 190 LBS | BODY MASS INDEX: 30.53 KG/M2 | HEIGHT: 66 IN

## 2017-07-13 LAB
INR PPP: 1.5 RATIO
QUALITY CONTROL: YES

## 2017-07-14 ENCOUNTER — RESULT REVIEW (OUTPATIENT)
Age: 77
End: 2017-07-14

## 2017-07-14 ENCOUNTER — TRANSCRIPTION ENCOUNTER (OUTPATIENT)
Age: 77
End: 2017-07-14

## 2017-07-14 ENCOUNTER — APPOINTMENT (OUTPATIENT)
Dept: INTERNAL MEDICINE | Facility: CLINIC | Age: 77
End: 2017-07-14

## 2017-07-14 ENCOUNTER — OUTPATIENT (OUTPATIENT)
Dept: OUTPATIENT SERVICES | Facility: HOSPITAL | Age: 77
LOS: 1 days | Discharge: HOME | End: 2017-07-14
Payer: MEDICARE

## 2017-07-14 VITALS
HEIGHT: 66 IN | WEIGHT: 195.11 LBS | RESPIRATION RATE: 14 BRPM | SYSTOLIC BLOOD PRESSURE: 129 MMHG | TEMPERATURE: 99 F | HEART RATE: 73 BPM | OXYGEN SATURATION: 96 % | DIASTOLIC BLOOD PRESSURE: 52 MMHG

## 2017-07-14 VITALS
OXYGEN SATURATION: 98 % | SYSTOLIC BLOOD PRESSURE: 116 MMHG | RESPIRATION RATE: 14 BRPM | HEART RATE: 76 BPM | TEMPERATURE: 99 F | DIASTOLIC BLOOD PRESSURE: 50 MMHG

## 2017-07-14 VITALS
TEMPERATURE: 98.5 F | BODY MASS INDEX: 30.53 KG/M2 | RESPIRATION RATE: 14 BRPM | OXYGEN SATURATION: 97 % | HEART RATE: 75 BPM | WEIGHT: 190 LBS | HEIGHT: 66 IN | DIASTOLIC BLOOD PRESSURE: 60 MMHG | SYSTOLIC BLOOD PRESSURE: 110 MMHG

## 2017-07-14 DIAGNOSIS — T14.8 OTHER INJURY OF UNSPECIFIED BODY REGION: ICD-10-CM

## 2017-07-14 DIAGNOSIS — S72.001A FRACTURE OF UNSPECIFIED PART OF NECK OF RIGHT FEMUR, INITIAL ENCOUNTER FOR CLOSED FRACTURE: Chronic | ICD-10-CM

## 2017-07-14 DIAGNOSIS — G56.01 CARPAL TUNNEL SYNDROME, RIGHT UPPER LIMB: ICD-10-CM

## 2017-07-14 DIAGNOSIS — Z95.828 PRESENCE OF OTHER VASCULAR IMPLANTS AND GRAFTS: Chronic | ICD-10-CM

## 2017-07-14 DIAGNOSIS — Z90.710 ACQUIRED ABSENCE OF BOTH CERVIX AND UTERUS: Chronic | ICD-10-CM

## 2017-07-14 DIAGNOSIS — H26.40 UNSPECIFIED SECONDARY CATARACT: Chronic | ICD-10-CM

## 2017-07-14 DIAGNOSIS — Z98.89 OTHER SPECIFIED POSTPROCEDURAL STATES: Chronic | ICD-10-CM

## 2017-07-14 DIAGNOSIS — Z98.890 OTHER SPECIFIED POSTPROCEDURAL STATES: Chronic | ICD-10-CM

## 2017-07-14 LAB
APTT BLD: 36.9 SEC — SIGNIFICANT CHANGE UP (ref 27.5–37.4)
INR BLD: 1.37 RATIO — HIGH (ref 0.88–1.16)
INR PPP: 1.2 RATIO
POCT-PROTHROMBIN TIME: 14.8 SECS
PROTHROM AB SERPL-ACNC: 15 SEC — HIGH (ref 9.8–12.7)
QUALITY CONTROL: YES

## 2017-07-14 PROCEDURE — 25028 I&D F/ARM&/WRST DP ABSC/HMTM: CPT | Mod: RT

## 2017-07-14 PROCEDURE — C1889: CPT

## 2017-07-14 PROCEDURE — 88304 TISSUE EXAM BY PATHOLOGIST: CPT | Mod: 26

## 2017-07-14 PROCEDURE — 85610 PROTHROMBIN TIME: CPT

## 2017-07-14 PROCEDURE — 36415 COLL VENOUS BLD VENIPUNCTURE: CPT

## 2017-07-14 PROCEDURE — 85730 THROMBOPLASTIN TIME PARTIAL: CPT

## 2017-07-14 PROCEDURE — 88304 TISSUE EXAM BY PATHOLOGIST: CPT

## 2017-07-14 RX ORDER — SODIUM CHLORIDE 9 MG/ML
1000 INJECTION, SOLUTION INTRAVENOUS
Qty: 0 | Refills: 0 | Status: DISCONTINUED | OUTPATIENT
Start: 2017-07-14 | End: 2017-07-14

## 2017-07-14 RX ORDER — ONDANSETRON 8 MG/1
4 TABLET, FILM COATED ORAL ONCE
Qty: 0 | Refills: 0 | Status: DISCONTINUED | OUTPATIENT
Start: 2017-07-14 | End: 2017-07-14

## 2017-07-14 RX ORDER — CEFAZOLIN SODIUM 1 G
2000 VIAL (EA) INJECTION ONCE
Qty: 0 | Refills: 0 | Status: COMPLETED | OUTPATIENT
Start: 2017-07-14 | End: 2017-07-14

## 2017-07-14 RX ORDER — OXYCODONE HYDROCHLORIDE 5 MG/1
5 TABLET ORAL EVERY 4 HOURS
Qty: 0 | Refills: 0 | Status: DISCONTINUED | OUTPATIENT
Start: 2017-07-14 | End: 2017-07-14

## 2017-07-14 RX ORDER — HYDROMORPHONE HYDROCHLORIDE 2 MG/ML
0.5 INJECTION INTRAMUSCULAR; INTRAVENOUS; SUBCUTANEOUS
Qty: 0 | Refills: 0 | Status: DISCONTINUED | OUTPATIENT
Start: 2017-07-14 | End: 2017-07-14

## 2017-07-14 RX ADMIN — HYDROMORPHONE HYDROCHLORIDE 0.5 MILLIGRAM(S): 2 INJECTION INTRAMUSCULAR; INTRAVENOUS; SUBCUTANEOUS at 19:00

## 2017-07-14 RX ADMIN — SODIUM CHLORIDE 60 MILLILITER(S): 9 INJECTION, SOLUTION INTRAVENOUS at 16:22

## 2017-07-14 RX ADMIN — OXYCODONE HYDROCHLORIDE 5 MILLIGRAM(S): 5 TABLET ORAL at 19:59

## 2017-07-14 RX ADMIN — HYDROMORPHONE HYDROCHLORIDE 0.5 MILLIGRAM(S): 2 INJECTION INTRAMUSCULAR; INTRAVENOUS; SUBCUTANEOUS at 18:44

## 2017-07-14 RX ADMIN — HYDROMORPHONE HYDROCHLORIDE 0.5 MILLIGRAM(S): 2 INJECTION INTRAMUSCULAR; INTRAVENOUS; SUBCUTANEOUS at 19:21

## 2017-07-14 NOTE — H&P ADULT - NSHPPHYSICALEXAM_GEN_ALL_CORE
PHYSICAL EXAM:  GENERAL: NAD, well-groomed, well-developed  HEAD:  Atraumatic, Normocephalic  HEART: Regular rate and rhythm; No murmurs, rubs, or gallops  RESPIRATORY: CTA B/L, No W/R/R  ABDOMEN: Soft, (+) numerous diffuse ecchymotic bruises to lower abdominal region. Isolated bruise to inner left and right thigh. Bowel sounds present  NEUROLOGY: A&Ox3, nonfocal, CN II-XII grossly intact, sensation intact, no gait abnormalities bilaterally;  EXTREMITIES:  2+ Peripheral Pulses, No clubbing, cyanosis, or edema  SKIN: warm, dry, normal color, no rash or abnormal lesions    Right wrist- (+) compressive dressing to Right wrist. Decreased sensation to light touch to all digist to Right hand due to swelling.   5/5- IO, EPL. Unable to  due to swelling. (+) diffusely swollen, ecchymotic pinky finger. No ttp along flexor sheath. (+) ttp to DIP joint. FDS/FDP intact.  Digits warm with brisk cap refill. PHYSICAL EXAM:  GENERAL: NAD, well-groomed, well-developed  HEAD:  Atraumatic, Normocephalic  HEART: Regular rate and rhythm; No murmurs, rubs, or gallops  RESPIRATORY: CTA B/L, No W/R/R  ABDOMEN: Soft, (+) numerous diffuse ecchymotic bruises to lower abdominal region. Isolated bruise to inner left and right thigh. Bowel sounds present  NEUROLOGY: A&Ox3, nonfocal, CN II-XII grossly intact, sensation intact, no gait abnormalities bilaterally;  EXTREMITIES:  2+ Peripheral Pulses, No clubbing, cyanosis, or edema  SKIN: warm, dry, normal color, no rash or abnormal lesions    Right wrist- (+) compressive dressing to Right wrist. Decreased sensation to light touch to all digits to Right hand due to swelling.   5/5- IO, EPL. Unable to  due to swelling. (+) diffusely swollen, ecchymotic pinky finger. No ttp along flexor sheath. (+) ttp to DIP joint. FDS/FDP intact.  Digits warm with brisk cap refill.

## 2017-07-14 NOTE — H&P ADULT - HISTORY OF PRESENT ILLNESS
76 Y.O. F presents to [facility] for I&D of Right wrist hematoma. Pt s/p Right CTR on 6/27/17. Pt tolerated procedure well, however patient was on postoperative anticoagulation for hx of Afib and began to develop a hematoma to the Right wrist while on coumadin and lovenox. Pt with PMHx significant for COPD, AF s/p pulmonary vein ablation on coumadin, DM, COPD, Pulmonary Fibrosis, AVN  hip s/p right hip ORIF and chronic anemia requiring several blood transfusions while in the hospital for 3 months recovering from complications after hip surgery. Pt with hx of CTR in 2008 in the past. Pt has been off of coumadin x past 3 days and has been bridged with Lovenox 100 mg BID- last dose 7/13 AM. 76 Y.O. F presents to [facility] for Evacuation of Right wrist hematoma. Pt s/p Right CTR on 6/27/17. Pt tolerated procedure well, however patient was on postoperative anticoagulation for hx of Afib and began to develop a hematoma to the Right wrist while on coumadin and lovenox. Pt with PMHx significant for COPD, AF s/p pulmonary vein ablation on coumadin, DM, COPD, Pulmonary Fibrosis, AVN  hip s/p right hip ORIF and chronic anemia requiring several blood transfusions while in the hospital for 3 months recovering from complications after hip surgery. Pt with hx of CTR in 2008 in the past. Pt has been off of coumadin x past 3 days and has been bridged with Lovenox 100 mg BID- last dose 7/13 AM.

## 2017-07-14 NOTE — H&P ADULT - NSHPREVIEWOFSYSTEMS_GEN_ALL_CORE
REVIEW OF SYSTEMS:    CONSTITUTIONAL: No fever, weight loss, or fatigue  RESPIRATORY: No cough, wheezing, chills or hemoptysis; No shortness of breath  CARDIOVASCULAR: No chest pain, palpitations, dizziness, or leg swelling  GASTROINTESTINAL: No abdominal or epigastric pain. No nausea, vomiting, or hematemesis; No diarrhea or constipation. No melena or hematochezia.  GENITOURINARY: No dysuria, frequency, hematuria, or incontinence  NEUROLOGICAL: No headaches, memory loss, loss of strength, numbness, or tremors  SKIN: No itching, burning, rashes, or lesions   HEME/LYMPH: Pt with several bruises to abdomen and isolated bruises No easy bruising, or bleeding gums REVIEW OF SYSTEMS:    CONSTITUTIONAL: No fever, weight loss, or fatigue  RESPIRATORY: No cough, wheezing, chills or hemoptysis; No shortness of breath  CARDIOVASCULAR: No chest pain, palpitations, dizziness, or leg swelling  GASTROINTESTINAL: No abdominal or epigastric pain. No nausea, vomiting, or hematemesis; No diarrhea or constipation. No melena or hematochezia.  GENITOURINARY: No dysuria, frequency, hematuria, or incontinence  NEUROLOGICAL: No headaches, memory loss, loss of strength, numbness, or tremors  SKIN: No itching, burning, rashes, or lesions   HEME/LYMPH: Pt with several bruises to abdomen and isolated bruises to inner thighs.

## 2017-07-14 NOTE — ASU DISCHARGE PLAN (ADULT/PEDIATRIC). - MEDICATION SUMMARY - MEDICATIONS TO STOP TAKING
I will STOP taking the medications listed below when I get home from the hospital:    Lovenox 100 mg/mL injectable solution  --  injectable 2 times a day

## 2017-07-14 NOTE — ASU DISCHARGE PLAN (ADULT/PEDIATRIC). - SPECIAL INSTRUCTIONS
FOLLOW UP WITH DR. LA ON MONDAY 07/17/2017 [phone]  FOLLOW UP WITH DR. GOODRICH CARDIOLOGIST ON MONDAY 07/17/2017 FOR FOLLOW UP AND BLOOD WORK TO ADJUST COUMADIN DOSAGE

## 2017-07-14 NOTE — ASU DISCHARGE PLAN (ADULT/PEDIATRIC). - MEDICATION SUMMARY - MEDICATIONS TO TAKE
I will START or STAY ON the medications listed below when I get home from the hospital:    Vicodin 5 mg-300 mg oral tablet  -- 1 tab(s) by mouth every 6 hours, As Needed for pain  -- Indication: For PAIN    amiodarone 100 mg oral tablet  --  by mouth   -- Indication: For HOME MEDICATION     Coumadin 3 mg oral tablet  -- 1 tab(s) by mouth once a day   4mg 5times /week and 6 mg 2times /week  -- PLEASE FOLLOW DR. GOODRICH CARDIALOGIST REGARDING DOSAGE AND FOLLOW UP BLOOD WORK   -- Indication: For HOME MEDICATION     simvastatin 20 mg oral tablet  -- 1 tab(s) by mouth once a day (at bedtime)  -- Indication: For HOME MEDICATION     Spiriva 18 mcg inhalation capsule  -- 1 each inhaled once a day  -- Indication: For HOME MEDICATION     Keflex 500 mg oral capsule  -- 1 cap(s) by mouth 4 times a day x3 days  -- Indication: For ANTIBIOTICS     furosemide 20 mg oral tablet  --  by mouth twice daily  -- Indication: For HOME MEDICATION     Bentyl 10 mg oral capsule  -- 2 cap(s) by mouth 4 times a day, As Needed  -- Indication: For HOME MEDICATION     ferrous sulfate (as elemental iron) 45 mg oral tablet, extended release  -- 1 tab(s) by mouth once a day  -- Indication: For HOME MEDICATION     Singulair 10 mg oral tablet  -- 1 tab(s) by mouth once a day  -- Indication: For HOME MEDICATION     fluticasone 27.5 mcg/inh nasal spray  -- 1 spray(s) into nose once a day  -- Indication: For HOME MEDICATION     Prevacid 15 mg oral delayed release capsule  -- 1 cap(s) by mouth once a day  -- Indication: For HOME MEDICATION     ascorbic acid 500 mg oral tablet  -- 1 tab(s) by mouth 2 times a day  -- Indication: For HOME MEDICATION     folic acid 1 mg oral tablet  -- 1 tab(s) by mouth once a day  -- Indication: For HOME MEDICATION     Vitamin D3 1000 intl units oral capsule  -- 1 cap(s) by mouth once a day  -- Indication: For HOME MEDICATION

## 2017-07-14 NOTE — H&P ADULT - PMH
COPD (chronic obstructive pulmonary disease)    DM (diabetes mellitus)    GIB (gastrointestinal bleeding)    Hiatal hernia    PAF (paroxysmal atrial fibrillation)  s/p ablation  Pericarditis    Shoulder fracture, right COPD (chronic obstructive pulmonary disease)    DM (diabetes mellitus)    GIB (gastrointestinal bleeding)    Hematoma  Hematoma of Right wrist  Hiatal hernia    PAF (paroxysmal atrial fibrillation)  s/p ablation  Pericarditis    Shoulder fracture, right

## 2017-07-14 NOTE — H&P ADULT - PSH
After cataract  bilateral eye cataract surgically removed  Closed right hip fracture  rigth hip ORIF july 19 2016  S/P carpal tunnel release  Right 2008 & 6/27/17  S/P foot surgery    S/P hysterectomy    S/P IVC filter  nov 2016  S/P knee surgery

## 2017-07-14 NOTE — H&P ADULT - NSHPLABSRESULTS_GEN_ALL_CORE
PT/INR - ( 14 Jul 2017 15:40 )   PT: 15.0 sec;   INR: 1.37 ratio         PTT - ( 14 Jul 2017 15:40 )  PTT:36.9 sec

## 2017-07-17 ENCOUNTER — APPOINTMENT (OUTPATIENT)
Dept: CARDIOLOGY | Facility: CLINIC | Age: 77
End: 2017-07-17

## 2017-07-17 LAB — INR PPP: 1.2 RATIO

## 2017-07-18 LAB — SURGICAL PATHOLOGY FINAL REPORT - CH: SIGNIFICANT CHANGE UP

## 2017-07-19 ENCOUNTER — APPOINTMENT (OUTPATIENT)
Dept: INTERNAL MEDICINE | Facility: CLINIC | Age: 77
End: 2017-07-19

## 2017-07-19 VITALS
HEIGHT: 66 IN | SYSTOLIC BLOOD PRESSURE: 130 MMHG | HEART RATE: 70 BPM | RESPIRATION RATE: 14 BRPM | WEIGHT: 190 LBS | BODY MASS INDEX: 30.53 KG/M2 | TEMPERATURE: 98.7 F | OXYGEN SATURATION: 98 % | DIASTOLIC BLOOD PRESSURE: 68 MMHG

## 2017-07-19 LAB
INR PPP: 1.4 RATIO
POCT-PROTHROMBIN TIME: 17.2 SECS
QUALITY CONTROL: YES

## 2017-07-20 ENCOUNTER — OUTPATIENT (OUTPATIENT)
Dept: OUTPATIENT SERVICES | Facility: HOSPITAL | Age: 77
LOS: 1 days | End: 2017-07-20
Payer: MEDICARE

## 2017-07-20 DIAGNOSIS — Z95.828 PRESENCE OF OTHER VASCULAR IMPLANTS AND GRAFTS: Chronic | ICD-10-CM

## 2017-07-20 DIAGNOSIS — Z98.89 OTHER SPECIFIED POSTPROCEDURAL STATES: Chronic | ICD-10-CM

## 2017-07-20 DIAGNOSIS — L76.32 POSTPROCEDURAL HEMATOMA OF SKIN AND SUBCUTANEOUS TISSUE FOLLOWING OTHER PROCEDURE: ICD-10-CM
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
## 2017-07-20 DIAGNOSIS — K58.9 IRRITABLE BOWEL SYNDROME WITHOUT DIARRHEA: ICD-10-CM

## 2017-07-20 DIAGNOSIS — H26.40 UNSPECIFIED SECONDARY CATARACT: Chronic | ICD-10-CM

## 2017-07-20 DIAGNOSIS — S72.001A FRACTURE OF UNSPECIFIED PART OF NECK OF RIGHT FEMUR, INITIAL ENCOUNTER FOR CLOSED FRACTURE: Chronic | ICD-10-CM

## 2017-07-20 DIAGNOSIS — D63.1 ANEMIA IN CHRONIC KIDNEY DISEASE: ICD-10-CM

## 2017-07-20 DIAGNOSIS — Z87.891 PERSONAL HISTORY OF NICOTINE DEPENDENCE: ICD-10-CM
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
## 2017-07-20 DIAGNOSIS — Z90.710 ACQUIRED ABSENCE OF BOTH CERVIX AND UTERUS: Chronic | ICD-10-CM

## 2017-07-20 DIAGNOSIS — Z86.711 PERSONAL HISTORY OF PULMONARY EMBOLISM: ICD-10-CM

## 2017-07-20 DIAGNOSIS — J44.9 CHRONIC OBSTRUCTIVE PULMONARY DISEASE, UNSPECIFIED: ICD-10-CM

## 2017-07-20 DIAGNOSIS — I48.91 UNSPECIFIED ATRIAL FIBRILLATION: ICD-10-CM

## 2017-07-20 DIAGNOSIS — Z79.01 LONG TERM (CURRENT) USE OF ANTICOAGULANTS: ICD-10-CM

## 2017-07-20 DIAGNOSIS — Z80.0 FAMILY HISTORY OF MALIGNANT NEOPLASM OF DIGESTIVE ORGANS: ICD-10-CM

## 2017-07-20 DIAGNOSIS — Z98.890 OTHER SPECIFIED POSTPROCEDURAL STATES: Chronic | ICD-10-CM
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
## 2017-07-20 DIAGNOSIS — I10 ESSENTIAL (PRIMARY) HYPERTENSION: ICD-10-CM

## 2017-07-20 DIAGNOSIS — L90.5 SCAR CONDITIONS AND FIBROSIS OF SKIN: ICD-10-CM

## 2017-07-20 DIAGNOSIS — Z80.52 FAMILY HISTORY OF MALIGNANT NEOPLASM OF BLADDER: ICD-10-CM

## 2017-07-20 DIAGNOSIS — L91.0 HYPERTROPHIC SCAR: ICD-10-CM

## 2017-07-20 DIAGNOSIS — E11.9 TYPE 2 DIABETES MELLITUS WITHOUT COMPLICATIONS: ICD-10-CM

## 2017-07-20 LAB
BLD GP AB SCN SERPL QL: SIGNIFICANT CHANGE UP
HCT VFR BLD CALC: 27.1 % — LOW (ref 34.5–45)
HGB BLD-MCNC: 8.9 G/DL — LOW (ref 11.5–15.5)
MCHC RBC-ENTMCNC: 32.8 GM/DL — SIGNIFICANT CHANGE UP (ref 32–36)
MCHC RBC-ENTMCNC: 33.7 PG — SIGNIFICANT CHANGE UP (ref 27–34)
MCV RBC AUTO: 102.7 FL — HIGH (ref 80–100)
PLATELET # BLD AUTO: 277 K/UL — SIGNIFICANT CHANGE UP (ref 150–400)
RBC # BLD: 2.64 M/UL — LOW (ref 3.8–5.2)
RBC # FLD: 21.8 % — HIGH (ref 10.3–14.5)
WBC # BLD: 7.3 K/UL — SIGNIFICANT CHANGE UP (ref 3.8–10.5)
WBC # FLD AUTO: 7.3 K/UL — SIGNIFICANT CHANGE UP (ref 3.8–10.5)

## 2017-07-20 PROCEDURE — 86920 COMPATIBILITY TEST SPIN: CPT

## 2017-07-20 PROCEDURE — 36430 TRANSFUSION BLD/BLD COMPNT: CPT

## 2017-07-20 PROCEDURE — 86901 BLOOD TYPING SEROLOGIC RH(D): CPT

## 2017-07-20 PROCEDURE — 85027 COMPLETE CBC AUTOMATED: CPT

## 2017-07-20 PROCEDURE — 86900 BLOOD TYPING SEROLOGIC ABO: CPT

## 2017-07-20 PROCEDURE — P9016: CPT

## 2017-07-20 PROCEDURE — 86850 RBC ANTIBODY SCREEN: CPT

## 2017-07-20 RX ORDER — ACETAMINOPHEN 500 MG
650 TABLET ORAL ONCE
Qty: 0 | Refills: 0 | Status: COMPLETED | OUTPATIENT
Start: 2017-07-20 | End: 2017-07-20

## 2017-07-20 RX ORDER — DIPHENHYDRAMINE HCL 50 MG
25 CAPSULE ORAL ONCE
Qty: 0 | Refills: 0 | Status: COMPLETED | OUTPATIENT
Start: 2017-07-20 | End: 2017-07-20

## 2017-07-20 RX ADMIN — Medication 25 MILLIGRAM(S): at 10:44

## 2017-07-20 RX ADMIN — Medication 650 MILLIGRAM(S): at 10:44

## 2017-07-21 ENCOUNTER — APPOINTMENT (OUTPATIENT)
Dept: CARDIOLOGY | Facility: CLINIC | Age: 77
End: 2017-07-21

## 2017-07-21 LAB — INR PPP: 1.6 RATIO

## 2017-07-26 ENCOUNTER — APPOINTMENT (OUTPATIENT)
Dept: INTERNAL MEDICINE | Facility: CLINIC | Age: 77
End: 2017-07-26

## 2017-07-26 VITALS
RESPIRATION RATE: 14 BRPM | TEMPERATURE: 98.3 F | WEIGHT: 190 LBS | BODY MASS INDEX: 30.53 KG/M2 | HEART RATE: 71 BPM | DIASTOLIC BLOOD PRESSURE: 72 MMHG | OXYGEN SATURATION: 97 % | HEIGHT: 66 IN | SYSTOLIC BLOOD PRESSURE: 128 MMHG

## 2017-07-26 LAB
INR PPP: 1.8 RATIO
POCT-PROTHROMBIN TIME: 21.5 SECS
QUALITY CONTROL: YES

## 2017-07-28 ENCOUNTER — APPOINTMENT (OUTPATIENT)
Dept: CARDIOLOGY | Facility: CLINIC | Age: 77
End: 2017-07-28
Payer: MEDICARE

## 2017-07-28 VITALS — HEART RATE: 71 BPM | WEIGHT: 190 LBS | OXYGEN SATURATION: 97 % | HEIGHT: 66 IN | BODY MASS INDEX: 30.53 KG/M2

## 2017-07-28 LAB
INR PPP: 1.9 RATIO
QUALITY CONTROL: YES

## 2017-07-28 PROCEDURE — 85610 PROTHROMBIN TIME: CPT | Mod: QW

## 2017-07-28 PROCEDURE — 99211 OFF/OP EST MAY X REQ PHY/QHP: CPT

## 2017-08-04 ENCOUNTER — APPOINTMENT (OUTPATIENT)
Dept: INTERNAL MEDICINE | Facility: CLINIC | Age: 77
End: 2017-08-04
Payer: MEDICARE

## 2017-08-04 VITALS
DIASTOLIC BLOOD PRESSURE: 66 MMHG | OXYGEN SATURATION: 97 % | HEIGHT: 66 IN | SYSTOLIC BLOOD PRESSURE: 112 MMHG | HEART RATE: 71 BPM | RESPIRATION RATE: 14 BRPM

## 2017-08-04 LAB
INR PPP: 2 RATIO
POCT-PROTHROMBIN TIME: 23.8 SECS
QUALITY CONTROL: YES

## 2017-08-04 PROCEDURE — 99214 OFFICE O/P EST MOD 30 MIN: CPT

## 2017-08-11 ENCOUNTER — APPOINTMENT (OUTPATIENT)
Dept: CARDIOLOGY | Facility: CLINIC | Age: 77
End: 2017-08-11
Payer: MEDICARE

## 2017-08-11 LAB — INR PPP: 1.9 RATIO

## 2017-08-11 PROCEDURE — 99211 OFF/OP EST MAY X REQ PHY/QHP: CPT

## 2017-08-11 PROCEDURE — 85610 PROTHROMBIN TIME: CPT | Mod: QW

## 2017-08-14 ENCOUNTER — APPOINTMENT (OUTPATIENT)
Dept: INTERNAL MEDICINE | Facility: CLINIC | Age: 77
End: 2017-08-14
Payer: MEDICARE

## 2017-08-14 ENCOUNTER — LABORATORY RESULT (OUTPATIENT)
Age: 77
End: 2017-08-14

## 2017-08-14 VITALS
DIASTOLIC BLOOD PRESSURE: 62 MMHG | WEIGHT: 190 LBS | BODY MASS INDEX: 30.53 KG/M2 | HEART RATE: 67 BPM | RESPIRATION RATE: 14 BRPM | OXYGEN SATURATION: 98 % | SYSTOLIC BLOOD PRESSURE: 118 MMHG | TEMPERATURE: 98 F | HEIGHT: 66 IN

## 2017-08-14 VITALS — SYSTOLIC BLOOD PRESSURE: 100 MMHG | DIASTOLIC BLOOD PRESSURE: 60 MMHG

## 2017-08-14 DIAGNOSIS — T14.8 OTHER INJURY OF UNSPECIFIED BODY REGION: ICD-10-CM

## 2017-08-14 LAB
ANION GAP SERPL CALC-SCNC: 12 MMOL/L
BASOPHILS # BLD AUTO: 0.01 K/UL
BASOPHILS NFR BLD AUTO: 0.2 %
BUN SERPL-MCNC: 29 MG/DL
CALCIUM SERPL-MCNC: 10.5 MG/DL
CHLORIDE SERPL-SCNC: 103 MMOL/L
CO2 SERPL-SCNC: 25 MMOL/L
CREAT SERPL-MCNC: 1.29 MG/DL
EOSINOPHIL # BLD AUTO: 0.03 K/UL
EOSINOPHIL NFR BLD AUTO: 0.6 %
GLUCOSE SERPL-MCNC: 96 MG/DL
HCT VFR BLD CALC: 32.3 %
HGB BLD-MCNC: 10.3 G/DL
IMM GRANULOCYTES NFR BLD AUTO: 0.7 %
LYMPHOCYTES # BLD AUTO: 1.89 K/UL
LYMPHOCYTES NFR BLD AUTO: 35 %
MAN DIFF?: NORMAL
MCHC RBC-ENTMCNC: 31.9 GM/DL
MCHC RBC-ENTMCNC: 31.9 PG
MCV RBC AUTO: 100 FL
MONOCYTES # BLD AUTO: 0.61 K/UL
MONOCYTES NFR BLD AUTO: 11.3 %
NEUTROPHILS # BLD AUTO: 2.82 K/UL
NEUTROPHILS NFR BLD AUTO: 52.2 %
PLATELET # BLD AUTO: 245 K/UL
POTASSIUM SERPL-SCNC: 4.6 MMOL/L
RBC # BLD: 3.23 M/UL
RBC # FLD: 23 %
SODIUM SERPL-SCNC: 140 MMOL/L
WBC # FLD AUTO: 5.4 K/UL

## 2017-08-14 PROCEDURE — 36415 COLL VENOUS BLD VENIPUNCTURE: CPT

## 2017-08-14 PROCEDURE — 99214 OFFICE O/P EST MOD 30 MIN: CPT | Mod: 25

## 2017-08-21 LAB
INR PPP: 2.3 RATIO
POCT-PROTHROMBIN TIME: 27.8 SECS
QUALITY CONTROL: YES

## 2017-08-25 ENCOUNTER — APPOINTMENT (OUTPATIENT)
Dept: CARDIOLOGY | Facility: CLINIC | Age: 77
End: 2017-08-25
Payer: MEDICARE

## 2017-08-25 VITALS — HEIGHT: 66 IN | WEIGHT: 190 LBS | BODY MASS INDEX: 30.53 KG/M2 | HEART RATE: 67 BPM

## 2017-08-25 LAB
INR PPP: 2.5 RATIO
QUALITY CONTROL: YES

## 2017-08-25 PROCEDURE — 85610 PROTHROMBIN TIME: CPT | Mod: QW

## 2017-08-25 PROCEDURE — 99211 OFF/OP EST MAY X REQ PHY/QHP: CPT

## 2017-08-29 ENCOUNTER — NON-APPOINTMENT (OUTPATIENT)
Age: 77
End: 2017-08-29

## 2017-08-29 ENCOUNTER — APPOINTMENT (OUTPATIENT)
Dept: CARDIOLOGY | Facility: CLINIC | Age: 77
End: 2017-08-29
Payer: MEDICARE

## 2017-08-29 VITALS
BODY MASS INDEX: 31.66 KG/M2 | HEIGHT: 66 IN | HEART RATE: 63 BPM | WEIGHT: 197 LBS | OXYGEN SATURATION: 96 % | SYSTOLIC BLOOD PRESSURE: 151 MMHG | DIASTOLIC BLOOD PRESSURE: 81 MMHG

## 2017-08-29 PROCEDURE — 99215 OFFICE O/P EST HI 40 MIN: CPT

## 2017-08-29 PROCEDURE — 93000 ELECTROCARDIOGRAM COMPLETE: CPT

## 2017-09-07 LAB
INR PPP: 2.8 RATIO
POCT-PROTHROMBIN TIME: 33.3 SECS
QUALITY CONTROL: YES

## 2017-09-15 ENCOUNTER — APPOINTMENT (OUTPATIENT)
Dept: CARDIOLOGY | Facility: CLINIC | Age: 77
End: 2017-09-15
Payer: MEDICARE

## 2017-09-15 VITALS — WEIGHT: 197 LBS | HEIGHT: 66 IN | HEART RATE: 63 BPM | BODY MASS INDEX: 31.66 KG/M2

## 2017-09-15 LAB
INR PPP: 2 RATIO
QUALITY CONTROL: YES

## 2017-09-15 PROCEDURE — 99211 OFF/OP EST MAY X REQ PHY/QHP: CPT

## 2017-09-15 PROCEDURE — 85610 PROTHROMBIN TIME: CPT | Mod: QW

## 2017-09-29 ENCOUNTER — APPOINTMENT (OUTPATIENT)
Dept: CARDIOLOGY | Facility: CLINIC | Age: 77
End: 2017-09-29
Payer: MEDICARE

## 2017-09-29 VITALS — HEIGHT: 66 IN | HEART RATE: 63 BPM | BODY MASS INDEX: 31.66 KG/M2 | WEIGHT: 197 LBS

## 2017-09-29 LAB
INR PPP: 3.7 RATIO
QUALITY CONTROL: YES

## 2017-09-29 PROCEDURE — 99211 OFF/OP EST MAY X REQ PHY/QHP: CPT

## 2017-09-29 PROCEDURE — 85610 PROTHROMBIN TIME: CPT | Mod: QW

## 2017-10-06 ENCOUNTER — APPOINTMENT (OUTPATIENT)
Dept: CARDIOLOGY | Facility: CLINIC | Age: 77
End: 2017-10-06
Payer: MEDICARE

## 2017-10-06 ENCOUNTER — APPOINTMENT (OUTPATIENT)
Dept: INTERNAL MEDICINE | Facility: CLINIC | Age: 77
End: 2017-10-06
Payer: MEDICARE

## 2017-10-06 VITALS — HEART RATE: 63 BPM | WEIGHT: 197 LBS | BODY MASS INDEX: 31.66 KG/M2 | HEIGHT: 66 IN

## 2017-10-06 LAB
INR PPP: 2.1 RATIO
QUALITY CONTROL: YES

## 2017-10-06 PROCEDURE — 90686 IIV4 VACC NO PRSV 0.5 ML IM: CPT

## 2017-10-06 PROCEDURE — 99211 OFF/OP EST MAY X REQ PHY/QHP: CPT

## 2017-10-06 PROCEDURE — G0008: CPT

## 2017-10-06 PROCEDURE — 85610 PROTHROMBIN TIME: CPT | Mod: QW

## 2017-10-20 ENCOUNTER — APPOINTMENT (OUTPATIENT)
Dept: CARDIOLOGY | Facility: CLINIC | Age: 77
End: 2017-10-20
Payer: MEDICARE

## 2017-10-20 VITALS — BODY MASS INDEX: 31.66 KG/M2 | HEART RATE: 63 BPM | HEIGHT: 66 IN | WEIGHT: 197 LBS

## 2017-10-20 LAB
INR PPP: 1.8 RATIO
QUALITY CONTROL: YES

## 2017-10-20 PROCEDURE — 99211 OFF/OP EST MAY X REQ PHY/QHP: CPT

## 2017-10-20 PROCEDURE — 85610 PROTHROMBIN TIME: CPT | Mod: QW

## 2017-10-30 ENCOUNTER — NON-APPOINTMENT (OUTPATIENT)
Age: 77
End: 2017-10-30

## 2017-10-30 ENCOUNTER — APPOINTMENT (OUTPATIENT)
Dept: CARDIOLOGY | Facility: CLINIC | Age: 77
End: 2017-10-30
Payer: MEDICARE

## 2017-10-30 VITALS
OXYGEN SATURATION: 96 % | HEIGHT: 66 IN | BODY MASS INDEX: 31.18 KG/M2 | HEART RATE: 65 BPM | SYSTOLIC BLOOD PRESSURE: 136 MMHG | DIASTOLIC BLOOD PRESSURE: 78 MMHG | WEIGHT: 194 LBS

## 2017-10-30 PROCEDURE — 93000 ELECTROCARDIOGRAM COMPLETE: CPT

## 2017-10-30 PROCEDURE — 99215 OFFICE O/P EST HI 40 MIN: CPT

## 2017-11-06 ENCOUNTER — LABORATORY RESULT (OUTPATIENT)
Age: 77
End: 2017-11-06

## 2017-11-06 ENCOUNTER — APPOINTMENT (OUTPATIENT)
Dept: INTERNAL MEDICINE | Facility: CLINIC | Age: 77
End: 2017-11-06
Payer: MEDICARE

## 2017-11-06 VITALS
DIASTOLIC BLOOD PRESSURE: 60 MMHG | SYSTOLIC BLOOD PRESSURE: 120 MMHG | WEIGHT: 194 LBS | RESPIRATION RATE: 14 BRPM | HEART RATE: 68 BPM | TEMPERATURE: 98 F | HEIGHT: 66 IN | OXYGEN SATURATION: 97 % | BODY MASS INDEX: 31.18 KG/M2

## 2017-11-06 LAB
INR PPP: 1.9 RATIO
POCT-PROTHROMBIN TIME: 23.2 SECS
QUALITY CONTROL: YES

## 2017-11-06 PROCEDURE — 85610 PROTHROMBIN TIME: CPT | Mod: QW

## 2017-11-06 PROCEDURE — 36415 COLL VENOUS BLD VENIPUNCTURE: CPT

## 2017-11-06 PROCEDURE — 99214 OFFICE O/P EST MOD 30 MIN: CPT | Mod: 25

## 2017-11-07 ENCOUNTER — APPOINTMENT (OUTPATIENT)
Dept: CARDIOLOGY | Facility: CLINIC | Age: 77
End: 2017-11-07
Payer: MEDICARE

## 2017-11-07 VITALS — BODY MASS INDEX: 31.18 KG/M2 | HEART RATE: 68 BPM | HEIGHT: 66 IN | WEIGHT: 194 LBS

## 2017-11-07 LAB
ALBUMIN SERPL ELPH-MCNC: 4.4 G/DL
ALP BLD-CCNC: 78 U/L
ALT SERPL-CCNC: 11 U/L
ANION GAP SERPL CALC-SCNC: 14 MMOL/L
AST SERPL-CCNC: 14 U/L
BASOPHILS # BLD AUTO: 0.12 K/UL
BASOPHILS NFR BLD AUTO: 1.7 %
BILIRUB SERPL-MCNC: 0.5 MG/DL
BUN SERPL-MCNC: 24 MG/DL
CALCIUM SERPL-MCNC: 10.3 MG/DL
CHLORIDE SERPL-SCNC: 105 MMOL/L
CO2 SERPL-SCNC: 22 MMOL/L
CREAT SERPL-MCNC: 1.29 MG/DL
EOSINOPHIL # BLD AUTO: 0 K/UL
EOSINOPHIL NFR BLD AUTO: 0 %
GLUCOSE SERPL-MCNC: 80 MG/DL
HCT VFR BLD CALC: 34.6 %
HGB BLD-MCNC: 11 G/DL
INR PPP: 1.9 RATIO
LYMPHOCYTES # BLD AUTO: 1.96 K/UL
LYMPHOCYTES NFR BLD AUTO: 28.4 %
MAN DIFF?: YES
MCHC RBC-ENTMCNC: 31.8 GM/DL
MCHC RBC-ENTMCNC: 33 PG
MCV RBC AUTO: 103.9 FL
MONOCYTES # BLD AUTO: 0.48 K/UL
MONOCYTES NFR BLD AUTO: 6.9 %
NEUTROPHILS # BLD AUTO: 4.11 K/UL
NEUTROPHILS NFR BLD AUTO: 58.6 %
PLATELET # BLD AUTO: 273 K/UL
POTASSIUM SERPL-SCNC: 4.6 MMOL/L
PROT SERPL-MCNC: 6.8 G/DL
QUALITY CONTROL: YES
RBC # BLD: 3.33 M/UL
RBC # FLD: 21.8 %
SODIUM SERPL-SCNC: 141 MMOL/L
TSH SERPL-ACNC: 2.03 UIU/ML
WBC # FLD AUTO: 6.9 K/UL

## 2017-11-07 PROCEDURE — 85610 PROTHROMBIN TIME: CPT | Mod: QW

## 2017-11-07 PROCEDURE — 99211 OFF/OP EST MAY X REQ PHY/QHP: CPT

## 2017-11-14 LAB
INR PPP: 2.2 RATIO
POCT-PROTHROMBIN TIME: 26.2 SECS
QUALITY CONTROL: YES

## 2017-12-08 ENCOUNTER — APPOINTMENT (OUTPATIENT)
Dept: CARDIOLOGY | Facility: CLINIC | Age: 77
End: 2017-12-08
Payer: MEDICARE

## 2017-12-08 VITALS — HEIGHT: 66 IN | BODY MASS INDEX: 31.18 KG/M2 | HEART RATE: 68 BPM | WEIGHT: 194 LBS

## 2017-12-08 LAB
INR PPP: 2.2 RATIO
QUALITY CONTROL: YES

## 2017-12-08 PROCEDURE — 99211 OFF/OP EST MAY X REQ PHY/QHP: CPT

## 2017-12-08 PROCEDURE — 85610 PROTHROMBIN TIME: CPT | Mod: QW

## 2017-12-22 ENCOUNTER — RX RENEWAL (OUTPATIENT)
Age: 77
End: 2017-12-22

## 2018-01-11 ENCOUNTER — APPOINTMENT (OUTPATIENT)
Dept: CARDIOLOGY | Facility: CLINIC | Age: 78
End: 2018-01-11
Payer: MEDICARE

## 2018-01-11 ENCOUNTER — NON-APPOINTMENT (OUTPATIENT)
Age: 78
End: 2018-01-11

## 2018-01-11 VITALS — HEART RATE: 65 BPM | WEIGHT: 196 LBS | HEIGHT: 66 IN | BODY MASS INDEX: 31.5 KG/M2

## 2018-01-11 VITALS
OXYGEN SATURATION: 95 % | HEART RATE: 65 BPM | SYSTOLIC BLOOD PRESSURE: 133 MMHG | DIASTOLIC BLOOD PRESSURE: 75 MMHG | BODY MASS INDEX: 31.5 KG/M2 | WEIGHT: 196 LBS | HEIGHT: 66 IN

## 2018-01-11 LAB
INR PPP: 2.2 RATIO
QUALITY CONTROL: YES

## 2018-01-11 PROCEDURE — 85610 PROTHROMBIN TIME: CPT | Mod: QW

## 2018-01-11 PROCEDURE — 99215 OFFICE O/P EST HI 40 MIN: CPT

## 2018-01-11 PROCEDURE — 93000 ELECTROCARDIOGRAM COMPLETE: CPT

## 2018-01-11 RX ORDER — ENOXAPARIN SODIUM 100 MG/ML
100 INJECTION SUBCUTANEOUS TWICE DAILY
Qty: 4 | Refills: 0 | Status: DISCONTINUED | COMMUNITY
Start: 2017-06-23 | End: 2018-01-11

## 2018-01-16 ENCOUNTER — RX RENEWAL (OUTPATIENT)
Age: 78
End: 2018-01-16

## 2018-01-17 ENCOUNTER — APPOINTMENT (OUTPATIENT)
Dept: INTERNAL MEDICINE | Facility: CLINIC | Age: 78
End: 2018-01-17
Payer: MEDICARE

## 2018-01-17 VITALS
WEIGHT: 190 LBS | OXYGEN SATURATION: 96 % | RESPIRATION RATE: 14 BRPM | DIASTOLIC BLOOD PRESSURE: 62 MMHG | HEART RATE: 66 BPM | BODY MASS INDEX: 30.53 KG/M2 | TEMPERATURE: 98.6 F | HEIGHT: 66 IN | SYSTOLIC BLOOD PRESSURE: 122 MMHG

## 2018-01-17 LAB
BILIRUB UR QL STRIP: NORMAL
GLUCOSE UR-MCNC: NORMAL
HCG UR QL: 0.2 EU/DL
HGB UR QL STRIP.AUTO: NORMAL
INR PPP: 2.2 RATIO
KETONES UR-MCNC: NORMAL
LEUKOCYTE ESTERASE UR QL STRIP: NORMAL
NITRITE UR QL STRIP: NORMAL
PH UR STRIP: 5
POCT-PROTHROMBIN TIME: 26.8 SECS
PROT UR STRIP-MCNC: NORMAL
QUALITY CONTROL: YES
SP GR UR STRIP: 1.02

## 2018-01-17 PROCEDURE — 81003 URINALYSIS AUTO W/O SCOPE: CPT | Mod: QW

## 2018-01-17 PROCEDURE — 99214 OFFICE O/P EST MOD 30 MIN: CPT | Mod: 25

## 2018-01-17 PROCEDURE — 36415 COLL VENOUS BLD VENIPUNCTURE: CPT

## 2018-01-17 PROCEDURE — 85610 PROTHROMBIN TIME: CPT | Mod: QW

## 2018-01-17 RX ORDER — NEOMYCIN AND POLYMYXIN B SULFATES AND DEXAMETHASONE 3.5; 10000; 1 MG/G; [IU]/G; MG/G
3.5-10000-0.1 OINTMENT OPHTHALMIC
Qty: 35 | Refills: 0 | Status: DISCONTINUED | COMMUNITY
Start: 2017-12-11

## 2018-01-18 ENCOUNTER — MEDICATION RENEWAL (OUTPATIENT)
Age: 78
End: 2018-01-18

## 2018-01-18 LAB
ANION GAP SERPL CALC-SCNC: 12 MMOL/L
BUN SERPL-MCNC: 24 MG/DL
CALCIUM SERPL-MCNC: 10.2 MG/DL
CHLORIDE SERPL-SCNC: 101 MMOL/L
CO2 SERPL-SCNC: 28 MMOL/L
CREAT SERPL-MCNC: 1.32 MG/DL
GLUCOSE SERPL-MCNC: 91 MG/DL
HBA1C MFR BLD HPLC: 5.1 %
POTASSIUM SERPL-SCNC: 5.6 MMOL/L
SODIUM SERPL-SCNC: 141 MMOL/L

## 2018-01-23 ENCOUNTER — APPOINTMENT (OUTPATIENT)
Dept: CARDIOLOGY | Facility: CLINIC | Age: 78
End: 2018-01-23
Payer: MEDICARE

## 2018-01-23 PROCEDURE — 93306 TTE W/DOPPLER COMPLETE: CPT

## 2018-01-24 ENCOUNTER — OTHER (OUTPATIENT)
Age: 78
End: 2018-01-24

## 2018-01-24 LAB
INR PPP: 2.6 RATIO
POCT-PROTHROMBIN TIME: 31.1 SECS
QUALITY CONTROL: YES

## 2018-02-01 LAB
CREAT 24H UR-MCNC: 1 G/24 H
CREAT 24H UR-MCNC: 1 G/24 H
CREAT ?TM UR-MCNC: 34 MG/DL
CREAT ?TM UR-MCNC: 34 MG/DL
PROT 24H UR-MRATE: <4 MG/DL
PROT ?TM UR-MCNC: 24 HR
PROT UR-MCNC: NORMAL MG/24 H
SPECIMEN VOL 24H UR: 2825 ML

## 2018-02-05 ENCOUNTER — APPOINTMENT (OUTPATIENT)
Dept: CARDIOLOGY | Facility: CLINIC | Age: 78
End: 2018-02-05

## 2018-02-09 ENCOUNTER — APPOINTMENT (OUTPATIENT)
Dept: CARDIOLOGY | Facility: CLINIC | Age: 78
End: 2018-02-09
Payer: MEDICARE

## 2018-02-09 VITALS — WEIGHT: 190 LBS | HEART RATE: 66 BPM | BODY MASS INDEX: 30.53 KG/M2 | HEIGHT: 66 IN

## 2018-02-09 LAB
INR PPP: 2.1 RATIO
QUALITY CONTROL: YES

## 2018-02-09 PROCEDURE — 99211 OFF/OP EST MAY X REQ PHY/QHP: CPT

## 2018-02-09 PROCEDURE — 85610 PROTHROMBIN TIME: CPT | Mod: QW

## 2018-02-16 RX ORDER — BLOOD SUGAR DIAGNOSTIC
STRIP MISCELLANEOUS
Qty: 100 | Refills: 2 | Status: ACTIVE | COMMUNITY
Start: 2017-01-31 | End: 1900-01-01

## 2018-03-05 ENCOUNTER — APPOINTMENT (OUTPATIENT)
Dept: CARDIOLOGY | Facility: CLINIC | Age: 78
End: 2018-03-05
Payer: MEDICARE

## 2018-03-05 VITALS — HEART RATE: 66 BPM | HEIGHT: 66 IN | WEIGHT: 190 LBS | BODY MASS INDEX: 30.53 KG/M2

## 2018-03-05 LAB
INR PPP: 2.1 RATIO
QUALITY CONTROL: YES

## 2018-03-05 PROCEDURE — 99211 OFF/OP EST MAY X REQ PHY/QHP: CPT

## 2018-03-05 PROCEDURE — 85610 PROTHROMBIN TIME: CPT | Mod: QW

## 2018-03-26 ENCOUNTER — NON-APPOINTMENT (OUTPATIENT)
Age: 78
End: 2018-03-26

## 2018-03-26 ENCOUNTER — APPOINTMENT (OUTPATIENT)
Dept: CARDIOLOGY | Facility: CLINIC | Age: 78
End: 2018-03-26
Payer: MEDICARE

## 2018-03-26 VITALS
HEIGHT: 66 IN | WEIGHT: 200 LBS | DIASTOLIC BLOOD PRESSURE: 78 MMHG | BODY MASS INDEX: 32.14 KG/M2 | OXYGEN SATURATION: 95 % | HEART RATE: 72 BPM | SYSTOLIC BLOOD PRESSURE: 136 MMHG

## 2018-03-26 PROCEDURE — 99215 OFFICE O/P EST HI 40 MIN: CPT

## 2018-03-26 PROCEDURE — 93000 ELECTROCARDIOGRAM COMPLETE: CPT

## 2018-04-04 ENCOUNTER — APPOINTMENT (OUTPATIENT)
Dept: CARDIOLOGY | Facility: CLINIC | Age: 78
End: 2018-04-04
Payer: MEDICARE

## 2018-04-04 VITALS — BODY MASS INDEX: 32.14 KG/M2 | HEIGHT: 66 IN | WEIGHT: 200 LBS | HEART RATE: 72 BPM

## 2018-04-04 LAB
INR PPP: 2.2 RATIO
QUALITY CONTROL: YES

## 2018-04-04 PROCEDURE — 85610 PROTHROMBIN TIME: CPT | Mod: QW

## 2018-04-04 PROCEDURE — 99211 OFF/OP EST MAY X REQ PHY/QHP: CPT

## 2018-04-16 ENCOUNTER — RX RENEWAL (OUTPATIENT)
Age: 78
End: 2018-04-16

## 2018-04-19 ENCOUNTER — TRANSCRIPTION ENCOUNTER (OUTPATIENT)
Age: 78
End: 2018-04-19

## 2018-04-20 ENCOUNTER — RESULT REVIEW (OUTPATIENT)
Age: 78
End: 2018-04-20

## 2018-04-20 ENCOUNTER — OUTPATIENT (OUTPATIENT)
Dept: OUTPATIENT SERVICES | Facility: HOSPITAL | Age: 78
LOS: 1 days | End: 2018-04-20
Payer: MEDICARE

## 2018-04-20 DIAGNOSIS — Z98.890 OTHER SPECIFIED POSTPROCEDURAL STATES: Chronic | ICD-10-CM

## 2018-04-20 DIAGNOSIS — Z90.710 ACQUIRED ABSENCE OF BOTH CERVIX AND UTERUS: Chronic | ICD-10-CM

## 2018-04-20 DIAGNOSIS — S72.001A FRACTURE OF UNSPECIFIED PART OF NECK OF RIGHT FEMUR, INITIAL ENCOUNTER FOR CLOSED FRACTURE: Chronic | ICD-10-CM

## 2018-04-20 DIAGNOSIS — H26.40 UNSPECIFIED SECONDARY CATARACT: Chronic | ICD-10-CM

## 2018-04-20 DIAGNOSIS — Z98.89 OTHER SPECIFIED POSTPROCEDURAL STATES: Chronic | ICD-10-CM

## 2018-04-20 DIAGNOSIS — Z95.828 PRESENCE OF OTHER VASCULAR IMPLANTS AND GRAFTS: Chronic | ICD-10-CM

## 2018-04-20 DIAGNOSIS — R13.10 DYSPHAGIA, UNSPECIFIED: ICD-10-CM

## 2018-04-20 DIAGNOSIS — K92.2 GASTROINTESTINAL HEMORRHAGE, UNSPECIFIED: ICD-10-CM

## 2018-04-20 PROCEDURE — 43235 EGD DIAGNOSTIC BRUSH WASH: CPT

## 2018-04-20 PROCEDURE — 88305 TISSUE EXAM BY PATHOLOGIST: CPT | Mod: 26

## 2018-04-20 PROCEDURE — 45385 COLONOSCOPY W/LESION REMOVAL: CPT

## 2018-04-27 ENCOUNTER — APPOINTMENT (OUTPATIENT)
Dept: CARDIOLOGY | Facility: CLINIC | Age: 78
End: 2018-04-27
Payer: MEDICARE

## 2018-04-27 VITALS — WEIGHT: 200 LBS | HEIGHT: 66 IN | BODY MASS INDEX: 32.14 KG/M2 | HEART RATE: 72 BPM

## 2018-04-27 LAB
INR PPP: 1.8 RATIO
QUALITY CONTROL: YES

## 2018-04-27 PROCEDURE — 99211 OFF/OP EST MAY X REQ PHY/QHP: CPT

## 2018-04-27 PROCEDURE — 85610 PROTHROMBIN TIME: CPT | Mod: QW

## 2018-05-02 ENCOUNTER — NON-APPOINTMENT (OUTPATIENT)
Age: 78
End: 2018-05-02

## 2018-05-02 ENCOUNTER — APPOINTMENT (OUTPATIENT)
Dept: CARDIOLOGY | Facility: CLINIC | Age: 78
End: 2018-05-02
Payer: MEDICARE

## 2018-05-02 VITALS
HEART RATE: 62 BPM | WEIGHT: 199 LBS | DIASTOLIC BLOOD PRESSURE: 84 MMHG | HEIGHT: 66 IN | BODY MASS INDEX: 31.98 KG/M2 | OXYGEN SATURATION: 96 % | SYSTOLIC BLOOD PRESSURE: 139 MMHG

## 2018-05-02 PROCEDURE — 99214 OFFICE O/P EST MOD 30 MIN: CPT

## 2018-05-02 PROCEDURE — 93000 ELECTROCARDIOGRAM COMPLETE: CPT

## 2018-05-04 ENCOUNTER — APPOINTMENT (OUTPATIENT)
Dept: CARDIOLOGY | Facility: CLINIC | Age: 78
End: 2018-05-04
Payer: MEDICARE

## 2018-05-04 VITALS — HEART RATE: 62 BPM | HEIGHT: 66 IN | WEIGHT: 199 LBS | BODY MASS INDEX: 31.98 KG/M2

## 2018-05-04 LAB
INR PPP: 3 RATIO
QUALITY CONTROL: YES

## 2018-05-04 PROCEDURE — 85610 PROTHROMBIN TIME: CPT | Mod: QW

## 2018-05-04 PROCEDURE — 99211 OFF/OP EST MAY X REQ PHY/QHP: CPT

## 2018-05-07 ENCOUNTER — APPOINTMENT (OUTPATIENT)
Dept: INTERNAL MEDICINE | Facility: CLINIC | Age: 78
End: 2018-05-07
Payer: MEDICARE

## 2018-05-07 VITALS
BODY MASS INDEX: 31.98 KG/M2 | RESPIRATION RATE: 14 BRPM | DIASTOLIC BLOOD PRESSURE: 80 MMHG | OXYGEN SATURATION: 95 % | TEMPERATURE: 98 F | SYSTOLIC BLOOD PRESSURE: 120 MMHG | HEART RATE: 64 BPM | HEIGHT: 66 IN | WEIGHT: 199 LBS

## 2018-05-07 PROCEDURE — 99214 OFFICE O/P EST MOD 30 MIN: CPT

## 2018-05-18 ENCOUNTER — APPOINTMENT (OUTPATIENT)
Dept: CARDIOLOGY | Facility: CLINIC | Age: 78
End: 2018-05-18
Payer: MEDICARE

## 2018-05-18 VITALS — HEART RATE: 64 BPM | WEIGHT: 199 LBS | BODY MASS INDEX: 31.98 KG/M2 | HEIGHT: 66 IN

## 2018-05-18 LAB
INR PPP: 3.2 RATIO
QUALITY CONTROL: YES

## 2018-05-18 PROCEDURE — 99211 OFF/OP EST MAY X REQ PHY/QHP: CPT

## 2018-05-18 PROCEDURE — 85610 PROTHROMBIN TIME: CPT | Mod: QW

## 2018-05-25 ENCOUNTER — APPOINTMENT (OUTPATIENT)
Dept: CARDIOLOGY | Facility: CLINIC | Age: 78
End: 2018-05-25
Payer: MEDICARE

## 2018-05-25 VITALS — WEIGHT: 199 LBS | BODY MASS INDEX: 31.98 KG/M2 | HEIGHT: 66 IN | HEART RATE: 64 BPM

## 2018-05-25 LAB
INR PPP: 2.4 RATIO
QUALITY CONTROL: YES

## 2018-05-25 PROCEDURE — 85610 PROTHROMBIN TIME: CPT | Mod: QW

## 2018-05-25 PROCEDURE — 99211 OFF/OP EST MAY X REQ PHY/QHP: CPT

## 2018-06-01 ENCOUNTER — APPOINTMENT (OUTPATIENT)
Dept: CARDIOLOGY | Facility: CLINIC | Age: 78
End: 2018-06-01
Payer: MEDICARE

## 2018-06-01 ENCOUNTER — NON-APPOINTMENT (OUTPATIENT)
Age: 78
End: 2018-06-01

## 2018-06-01 VITALS — SYSTOLIC BLOOD PRESSURE: 144 MMHG | DIASTOLIC BLOOD PRESSURE: 82 MMHG

## 2018-06-01 VITALS — WEIGHT: 200 LBS | HEART RATE: 61 BPM | OXYGEN SATURATION: 98 % | BODY MASS INDEX: 32.14 KG/M2 | HEIGHT: 66 IN

## 2018-06-01 PROCEDURE — 93000 ELECTROCARDIOGRAM COMPLETE: CPT

## 2018-06-01 PROCEDURE — 99214 OFFICE O/P EST MOD 30 MIN: CPT

## 2018-06-04 ENCOUNTER — APPOINTMENT (OUTPATIENT)
Dept: CARDIOLOGY | Facility: CLINIC | Age: 78
End: 2018-06-04
Payer: MEDICARE

## 2018-06-04 PROCEDURE — 93790 AMBL BP MNTR W/SW I&R: CPT

## 2018-06-08 ENCOUNTER — APPOINTMENT (OUTPATIENT)
Dept: CARDIOLOGY | Facility: CLINIC | Age: 78
End: 2018-06-08
Payer: MEDICARE

## 2018-06-08 VITALS — HEIGHT: 66 IN | BODY MASS INDEX: 32.14 KG/M2 | HEART RATE: 61 BPM | WEIGHT: 200 LBS

## 2018-06-08 LAB
INR PPP: 2.7 RATIO
QUALITY CONTROL: YES

## 2018-06-08 PROCEDURE — 99211 OFF/OP EST MAY X REQ PHY/QHP: CPT

## 2018-06-08 PROCEDURE — 85610 PROTHROMBIN TIME: CPT | Mod: QW

## 2018-06-29 ENCOUNTER — APPOINTMENT (OUTPATIENT)
Dept: CARDIOLOGY | Facility: CLINIC | Age: 78
End: 2018-06-29
Payer: MEDICARE

## 2018-06-29 VITALS — HEART RATE: 61 BPM | BODY MASS INDEX: 32.14 KG/M2 | WEIGHT: 200 LBS | HEIGHT: 66 IN

## 2018-06-29 PROCEDURE — 85610 PROTHROMBIN TIME: CPT | Mod: QW

## 2018-06-29 PROCEDURE — 93793 ANTICOAG MGMT PT WARFARIN: CPT

## 2018-07-05 LAB
INR PPP: 2.6 RATIO
QUALITY CONTROL: YES

## 2018-07-18 ENCOUNTER — RX RENEWAL (OUTPATIENT)
Age: 78
End: 2018-07-18

## 2018-07-31 ENCOUNTER — APPOINTMENT (OUTPATIENT)
Dept: CARDIOLOGY | Facility: CLINIC | Age: 78
End: 2018-07-31
Payer: MEDICARE

## 2018-07-31 ENCOUNTER — NON-APPOINTMENT (OUTPATIENT)
Age: 78
End: 2018-07-31

## 2018-07-31 VITALS — HEIGHT: 66 IN | WEIGHT: 202 LBS | HEART RATE: 66 BPM | OXYGEN SATURATION: 96 % | BODY MASS INDEX: 32.47 KG/M2

## 2018-07-31 VITALS — DIASTOLIC BLOOD PRESSURE: 64 MMHG | SYSTOLIC BLOOD PRESSURE: 130 MMHG

## 2018-07-31 LAB
INR PPP: 2.8 RATIO
QUALITY CONTROL: YES

## 2018-07-31 PROCEDURE — 85610 PROTHROMBIN TIME: CPT | Mod: QW

## 2018-07-31 PROCEDURE — 93000 ELECTROCARDIOGRAM COMPLETE: CPT

## 2018-07-31 PROCEDURE — 99214 OFFICE O/P EST MOD 30 MIN: CPT

## 2018-08-28 ENCOUNTER — APPOINTMENT (OUTPATIENT)
Dept: CARDIOLOGY | Facility: CLINIC | Age: 78
End: 2018-08-28
Payer: MEDICARE

## 2018-08-28 PROCEDURE — 93793 ANTICOAG MGMT PT WARFARIN: CPT

## 2018-08-28 PROCEDURE — 85610 PROTHROMBIN TIME: CPT | Mod: QW

## 2018-08-29 VITALS — BODY MASS INDEX: 32.47 KG/M2 | WEIGHT: 202 LBS | HEART RATE: 66 BPM | HEIGHT: 66 IN

## 2018-08-29 LAB
INR PPP: 2.2 RATIO
QUALITY CONTROL: YES

## 2018-09-13 ENCOUNTER — APPOINTMENT (OUTPATIENT)
Dept: CARDIOLOGY | Facility: CLINIC | Age: 78
End: 2018-09-13

## 2018-09-21 ENCOUNTER — APPOINTMENT (OUTPATIENT)
Dept: INTERNAL MEDICINE | Facility: CLINIC | Age: 78
End: 2018-09-21
Payer: MEDICARE

## 2018-09-21 VITALS
WEIGHT: 198 LBS | RESPIRATION RATE: 14 BRPM | TEMPERATURE: 98.6 F | BODY MASS INDEX: 31.82 KG/M2 | HEIGHT: 66 IN | DIASTOLIC BLOOD PRESSURE: 60 MMHG | SYSTOLIC BLOOD PRESSURE: 130 MMHG | OXYGEN SATURATION: 94 % | HEART RATE: 64 BPM

## 2018-09-21 LAB
INR PPP: 2.2 RATIO
POCT-PROTHROMBIN TIME: 26.7 SECS
QUALITY CONTROL: YES

## 2018-09-21 PROCEDURE — 85610 PROTHROMBIN TIME: CPT | Mod: QW

## 2018-09-21 PROCEDURE — G0439: CPT

## 2018-09-21 PROCEDURE — 90662 IIV NO PRSV INCREASED AG IM: CPT

## 2018-09-21 PROCEDURE — G0008: CPT

## 2018-09-21 NOTE — HEALTH RISK ASSESSMENT
[Good] : ~his/her~  mood as  good [No falls in past year] : Patient reported no falls in the past year [0] : 2) Feeling down, depressed, or hopeless: Not at all (0) [None] : None [With Family] : lives with family [Retired] : retired [] :  [Feels Safe at Home] : Feels safe at home [Fully functional (bathing, dressing, toileting, transferring, walking, feeding)] : Fully functional (bathing, dressing, toileting, transferring, walking, feeding) [Fully functional (using the telephone, shopping, preparing meals, housekeeping, doing laundry, using] : Fully functional and needs no help or supervision to perform IADLs (using the telephone, shopping, preparing meals, housekeeping, doing laundry, using transportation, managing medications and managing finances) [Independent] : managing finances [Full assistance needed] : using transportation [Smoke Detector] : smoke detector [Carbon Monoxide Detector] : carbon monoxide detector [Seat Belt] :  uses seat belt [] : No [Change in mental status noted] : No change in mental status noted [Reports changes in hearing] : Reports no changes in hearing [Guns at Home] : no guns at home

## 2018-09-21 NOTE — REVIEW OF SYSTEMS
[Dizziness] : dizziness [Negative] : Heme/Lymph [Headache] : no headache [Memory Loss] : no memory loss

## 2018-09-21 NOTE — PHYSICAL EXAM
[No Acute Distress] : no acute distress [Well Nourished] : well nourished [Well Developed] : well developed [Well-Appearing] : well-appearing [Normal Voice/Communication] : normal voice/communication [Normal Sclera/Conjunctiva] : normal sclera/conjunctiva [PERRL] : pupils equal round and reactive to light [EOMI] : extraocular movements intact [Normal Outer Ear/Nose] : the outer ears and nose were normal in appearance [Normal Oropharynx] : the oropharynx was normal [No JVD] : no jugular venous distention [Supple] : supple [No Lymphadenopathy] : no lymphadenopathy [Thyroid Normal, No Nodules] : the thyroid was normal and there were no nodules present [No Respiratory Distress] : no respiratory distress  [No Accessory Muscle Use] : no accessory muscle use [Decreased Breath Sounds] : breath sounds were decreased diffusely [Normal Rate] : normal rate  [Regular Rhythm] : with a regular rhythm [Normal S1, S2] : normal S1 and S2 [No Murmur] : no murmur heard [No Carotid Bruits] : no carotid bruits [No Abdominal Bruit] : a ~M bruit was not heard ~T in the abdomen [No Varicosities] : no varicosities [Pedal Pulses Present] : the pedal pulses are present [No Edema] : there was no peripheral edema [No Extremity Clubbing/Cyanosis] : no extremity clubbing/cyanosis [No Palpable Aorta] : no palpable aorta [Soft] : abdomen soft [Non Tender] : non-tender [Non-distended] : non-distended [No Masses] : no abdominal mass palpated [No HSM] : no HSM [Normal Bowel Sounds] : normal bowel sounds [Normal Supraclavicular Nodes] : no supraclavicular lymphadenopathy [Normal Posterior Cervical Nodes] : no posterior cervical lymphadenopathy [Normal Anterior Cervical Nodes] : no anterior cervical lymphadenopathy [No CVA Tenderness] : no CVA  tenderness [No Spinal Tenderness] : no spinal tenderness [No Joint Swelling] : no joint swelling [Grossly Normal Strength/Tone] : grossly normal strength/tone [No Rash] : no rash [Normal Gait] : normal gait [Coordination Grossly Intact] : coordination grossly intact [No Focal Deficits] : no focal deficits [Deep Tendon Reflexes (DTR)] : deep tendon reflexes were 2+ and symmetric [Normal Affect] : the affect was normal [Normal Insight/Judgement] : insight and judgment were intact

## 2018-09-27 ENCOUNTER — APPOINTMENT (OUTPATIENT)
Dept: CARDIOLOGY | Facility: CLINIC | Age: 78
End: 2018-09-27
Payer: MEDICARE

## 2018-09-27 VITALS — BODY MASS INDEX: 31.82 KG/M2 | WEIGHT: 198 LBS | HEART RATE: 65 BPM | HEIGHT: 66 IN

## 2018-09-27 LAB
INR PPP: 2.3 RATIO
QUALITY CONTROL: YES

## 2018-09-27 PROCEDURE — 85610 PROTHROMBIN TIME: CPT | Mod: QW

## 2018-09-27 PROCEDURE — 93793 ANTICOAG MGMT PT WARFARIN: CPT

## 2018-10-10 ENCOUNTER — APPOINTMENT (OUTPATIENT)
Dept: CARDIOLOGY | Facility: CLINIC | Age: 78
End: 2018-10-10
Payer: MEDICARE

## 2018-10-10 ENCOUNTER — NON-APPOINTMENT (OUTPATIENT)
Age: 78
End: 2018-10-10

## 2018-10-10 VITALS
SYSTOLIC BLOOD PRESSURE: 115 MMHG | HEART RATE: 61 BPM | OXYGEN SATURATION: 95 % | HEIGHT: 66 IN | DIASTOLIC BLOOD PRESSURE: 75 MMHG | WEIGHT: 203.31 LBS | BODY MASS INDEX: 32.67 KG/M2

## 2018-10-10 PROCEDURE — 99215 OFFICE O/P EST HI 40 MIN: CPT

## 2018-10-10 PROCEDURE — 93000 ELECTROCARDIOGRAM COMPLETE: CPT

## 2018-10-16 NOTE — ASU PATIENT PROFILE, ADULT - NS PRO TALK SOMEONE YN
EXAM DESCRIPTION:  CHEST SINGLE VIEW



COMPLETED DATE/TIME:  10/16/2018 11:37 am



REASON FOR STUDY:  fall



COMPARISON:  Chest films 11/23/2015, 2/4/2016, 9/10/2018



EXAM PARAMETERS:  NUMBER OF VIEWS: One view.

TECHNIQUE: Single frontal radiographic view of the chest acquired.

RADIATION DOSE: NA

LIMITATIONS: None.



FINDINGS:  LUNGS AND PLEURA: No opacities, masses or pneumothorax. No pleural effusion.

MEDIASTINUM AND HILAR STRUCTURES: No masses.  Contour normal.

HEART AND VASCULAR STRUCTURES: Heart normal in size.  Normal vasculature.

BONES: No acute findings.

HARDWARE: Clips right upper quadrant post cholecystectomy

OTHER: No other significant finding.



IMPRESSION:  NO ACUTE RADIOGRAPHIC FINDING IN THE CHEST.



TECHNICAL DOCUMENTATION:  JOB ID:  [id number]

 2011 [employer]- All Rights Reserved



Reading location - IP/workstation name: [email or web address]
EXAM DESCRIPTION:  CT CERVICAL SPINE WITHOUT



COMPLETED DATE/TIME:  10/16/2018 11:46 am



REASON FOR STUDY:  fall hip pain fall injury neck pain



COMPARISON:  CT cervical spine 6/13/2015



TECHNIQUE:  Axial images acquired through the cervical spine without intravenous contrast.  Images re
viewed with lung, soft tissue and bone windows.  Reconstructed coronal and sagittal MPR images review
ed.  Images stored on PACS.

All CT scanners at this facility use dose modulation, iterative reconstruction, and/or weight based d
osing when appropriate to reduce radiation dose to as low as reasonably achievable (ALARA).

[facility]: Dose Right  [facility]: CareDose    [facility]: Dose Right    [facility]: Teradose 4D    [facility]: Smart Technologies



RADIATION DOSE:  CT Rad equipment meets quality standard of care and radiation dose reduction techniq
ues were employed. CTDIvol: 17.2 mGy. DLP: 301 mGy-cm. mGy.



LIMITATIONS:  None.



FINDINGS:  ALIGNMENT: Anatomic.

MINERALIZATION: Normal.

VERTEBRAL BODIES: No fractures or dislocation.

DISCS: Diffuse disc space loss of height with mild posterior disc bulge and bony spurring at C4-5, C5
-6, and C6-7 with moderate bilateral foraminal narrowing at these levels.

FACETS, LATERAL MASSES, POSTERIOR ELEMENTS: No fractures.  No dislocation.  No acute findings.

HARDWARE: None in the spine.

VISUALIZED RIBS: No fractures.

LUNG APICES AND SOFT TISSUES: No significant or acute findings.

OTHER: No other significant finding.



IMPRESSION:  No acute fracture or malalignment



TECHNICAL DOCUMENTATION:  JOB ID:  [id number]

Quality ID # 436: Final reports with documentation of one or more dose reduction techniques (e.g., Au
tomated exposure control, adjustment of the mA and/or kV according to patient size, use of iterative 
reconstruction technique)

 2011 [email or web address]- All Rights Reserved



Reading location - IP/workstation name: [facility]-[email or web address]
EXAM DESCRIPTION:  CT HEAD WITHOUT



COMPLETED DATE/TIME:  10/16/2018 11:49 am



REASON FOR STUDY:  fall hip pain fall, injury, head pain



COMPARISON:  CT brain 9/10/2018, 3/19/2016, 10/16/2015, 12/2/2012



TECHNIQUE:  Axial images acquired through the brain without intravenous contrast.  Images reviewed wi
th bone, brain and subdural windows.  Additional sagittal and coronal reconstructions were generated.
 Images stored on PACS.

All CT scanners at this facility use dose modulation, iterative reconstruction, and/or weight based d
osing when appropriate to reduce radiation dose to as low as reasonably achievable (ALARA).

[facility]: Dose Right  [facility]: CareDose    [facility]: Dose Right    [facility]: Teradose 4D    [facility]: Smart Technologies



RADIATION DOSE:  CT Rad equipment meets quality standard of care and radiation dose reduction techniq
ues were employed. CTDIvol: 53.2 mGy. DLP: 1017 mGy-cm. mGy.



LIMITATIONS:  None.



FINDINGS:  VENTRICLES: Normal size and contour.

CEREBRUM: No CT evidence of acute large territory ischemic change, acute intracranial hemorrhage, mas
s effect, or midline shift.

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  Spotty moderate small v
essel ischemic change in the hemispheric white matter.  These findings are stable.

CEREBELLUM: No masses.  No hemorrhage.  Mild pontine small vessel ischemic change.  Old lacunar infar
ct left cerebellar hemisphere.  No evidence for acute infarction.

EXTRAAXIAL SPACES: No fluid collections.  No masses.

ORBITS AND GLOBE: No intra- or extraconal masses.  Post bilateral cataract surgery.

CALVARIUM: No fracture.

PARANASAL SINUSES: No fluid or mucosal thickening.

SOFT TISSUES: No mass or hematoma.

OTHER: No other significant finding.



IMPRESSION:  No acute findings.

EVIDENCE OF ACUTE STROKE: NO.



COMMENT:  Quality ID # 436: Final reports with documentation of one or more dose reduction techniques
 (e.g., Automated exposure control, adjustment of the mA and/or kV according to patient size, use of 
iterative reconstruction technique)



TECHNICAL DOCUMENTATION:  JOB ID:  [id number]

 2011 [employer]- All Rights Reserved



Reading location - IP/workstation name: [facility]
EXAM DESCRIPTION:  HIP LEFT AP/LATERAL



COMPLETED DATE/TIME:  10/16/2018 11:37 am



REASON FOR STUDY:  fall injury pain, left hip pain



COMPARISON:  CT abdomen pelvis 3/19/2016



NUMBER OF VIEWS:  Two views.



TECHNIQUE:  AP pelvis and additional frog-leg view of the left hip.



LIMITATIONS:  None.



FINDINGS:  MINERALIZATION: Osteopenic

LEFT HIP: Old left subcapital femoral neck fracture stabilized with 3 lag screws.  No acute fracture.
  Mild left hip joint space narrowing.  No bulky bony spurring.

RIGHT HIP: Mild right hip joint space narrowing.  No acute fracture or malalignment.

PUBIS AND ISCHIUM: No fracture.

PELVIS: No fracture.

SACRUM: No fracture or dislocation. No worrisome bone lesions.

LOWER LUMBAR SPINE: Degenerative disc changes at L3-4 and L4-5

SOFT TISSUES: No findings.

OTHER: No other significant finding.



IMPRESSION:  No acute findings



TECHNICAL DOCUMENTATION:  JOB ID:  [id number]

 2011 [employer]- All Rights Reserved



Reading location - IP/workstation name: [email or web address]
EXAM DESCRIPTION:  SHOULDER LEFT 2 OR MORE VIEWS



COMPLETED DATE/TIME:  10/16/2018 11:37 am



REASON FOR STUDY:  FALL fall, injury, left shoulder pain



COMPARISON:  Chest and rib films 3/19/2016

Chest films 4/18/2017, 10/16/2018



NUMBER OF VIEWS:  Three views.



TECHNIQUE:  Internal rotation, external rotation, and Y view images acquired of the left shoulder.



LIMITATIONS:  None.



FINDINGS:  MINERALIZATION: Osteopenic

BONES: No acute fracture or dislocation.  No worrisome bone lesions.

JOINTS: Question widening of the left acromioclavicular joint.  Correlate clinically.

Normal alignment at the left glenohumeral joint.

VISUALIZED LUNGS AND RIBS: No pneumothorax.  No rib fracture.

SOFT TISSUES: No radiopaque foreign body.

OTHER: No other significant finding.



IMPRESSION:  Question widening at the left acromioclavicular joint.

Bones osteopenic.  Left shoulder films otherwise unremarkable



TECHNICAL DOCUMENTATION:  JOB ID:  [id number]

 2011 [employer]- All Rights Reserved



Reading location - IP/workstation name: [facility]-[email or web address]
no

## 2018-10-22 PROBLEM — M25.551 RIGHT HIP PAIN: Status: ACTIVE | Noted: 2018-10-22

## 2018-10-23 ENCOUNTER — APPOINTMENT (OUTPATIENT)
Dept: ORTHOPEDIC SURGERY | Facility: CLINIC | Age: 78
End: 2018-10-23
Payer: MEDICARE

## 2018-10-23 VITALS
HEART RATE: 67 BPM | BODY MASS INDEX: 34.66 KG/M2 | DIASTOLIC BLOOD PRESSURE: 77 MMHG | WEIGHT: 203 LBS | HEIGHT: 64 IN | SYSTOLIC BLOOD PRESSURE: 152 MMHG

## 2018-10-23 DIAGNOSIS — M25.551 PAIN IN RIGHT HIP: ICD-10-CM

## 2018-10-23 DIAGNOSIS — Z87.81 PERSONAL HISTORY OF (HEALED) TRAUMATIC FRACTURE: ICD-10-CM

## 2018-10-23 PROCEDURE — 99204 OFFICE O/P NEW MOD 45 MIN: CPT

## 2018-10-23 PROCEDURE — 73502 X-RAY EXAM HIP UNI 2-3 VIEWS: CPT | Mod: RT

## 2018-10-25 ENCOUNTER — APPOINTMENT (OUTPATIENT)
Dept: CARDIOLOGY | Facility: CLINIC | Age: 78
End: 2018-10-25
Payer: MEDICARE

## 2018-10-25 VITALS — HEIGHT: 64 IN | BODY MASS INDEX: 34.66 KG/M2 | HEART RATE: 67 BPM | WEIGHT: 203 LBS

## 2018-10-25 LAB
INR PPP: 2.1 RATIO
QUALITY CONTROL: YES

## 2018-10-25 PROCEDURE — 85610 PROTHROMBIN TIME: CPT | Mod: QW

## 2018-10-25 PROCEDURE — 93793 ANTICOAG MGMT PT WARFARIN: CPT

## 2018-11-26 ENCOUNTER — APPOINTMENT (OUTPATIENT)
Dept: CARDIOLOGY | Facility: CLINIC | Age: 78
End: 2018-11-26
Payer: MEDICARE

## 2018-11-26 ENCOUNTER — NON-APPOINTMENT (OUTPATIENT)
Age: 78
End: 2018-11-26

## 2018-11-26 VITALS
OXYGEN SATURATION: 96 % | WEIGHT: 204 LBS | BODY MASS INDEX: 34.83 KG/M2 | HEIGHT: 64 IN | HEART RATE: 65 BPM | DIASTOLIC BLOOD PRESSURE: 77 MMHG | SYSTOLIC BLOOD PRESSURE: 122 MMHG

## 2018-11-26 PROCEDURE — 99214 OFFICE O/P EST MOD 30 MIN: CPT

## 2018-11-26 PROCEDURE — 93000 ELECTROCARDIOGRAM COMPLETE: CPT

## 2018-11-26 PROCEDURE — 85610 PROTHROMBIN TIME: CPT | Mod: QW

## 2018-11-26 NOTE — HISTORY OF PRESENT ILLNESS
[FreeTextEntry1] : 78-year-old woman with a history of GI bleeding , atrial fibrillation  status post pulmonary vein isolation at [facility] not on anticoagulation, COPD, diabetes, IPF, granuloma annulari (resolves with steroids) palpations. An event monitor demonstrated that her symptoms correlate to PAF/PAT episodes and APCs.\par we started her on Coumadin but even before she was therapeutic she started having bouts of rectal bleeding. Her Coumadin was then discontinued.\par After speaking to her colorectal surgeon we resumed her AC. \par \par \par in July of 2016 she had a fall  with hip fracture. Her course was cough with treated by an SBO requiring an exlap. She had a  left leg DVT and PE. She had a IVC filter placed. She had a  spontaneous hematoma develop and was taken off of AC. She also had multiple transfusions. Her Sotalol was discontinued and she was put on Amiodarone in order to help maintain SR rhythm. \par \par She was discharged and then developed a right leg DVT in rehab on 10/2016. She had been restarted on anticoagulation. She reports already requiring 3 PRBC transfusions in rehab. \par \par She had right hand carpal tunnel surgery on 6/27/17 at  with resulting hematoma while on Lovenox bridge. \par \par Her ABPM showed an average BP of 132/72.\par \par She is now here for a followup visit.\par Since her last visit, she saw ortho who did not think that she was a candidate for a hip surgery. She has started aqua therapy. She had noticed more alopecia since increasing the Tramadol to 50mg q8.  \par  \par She is still getting lightheaded. It is happening now at least once a day.  It occurs when standing up from a seated position.  She denies any syncope or falls.  \par Her lower extremity edema has essentially resolved. She is drinking at least about 3 16oz bottles. \par  \par She gets intermittent palpitations lasting for seconds and self limiting. It happens about 3-5x a week now. \par she denies any dyspnea. \par \par Her hip limits her activities. She is trying to walk as much as possible.  \par \par She  denies any chest pain, PND, orthopnea,   syncope, strokelike symptoms. She is compliant with her other medications. \par No reported melena, hematochezia or hematemesis.  \par Her Hb has been stable between 10-11. She is still getting Procrit.

## 2018-11-26 NOTE — CARDIOLOGY SUMMARY
[___] : [unfilled] [No Ischemia] : no Ischemia [None] : normal LV function [Normal] : normal LA size [Mild] : mild mitral regurgitation

## 2018-11-26 NOTE — PHYSICAL EXAM
[General Appearance - Well Developed] : well developed [General Appearance - Well Nourished] : well nourished [Normal Conjunctiva] : the conjunctiva exhibited no abnormalities [Eyelids - No Xanthelasma] : the eyelids demonstrated no xanthelasmas [Normal Oral Mucosa] : normal oral mucosa [No Oral Pallor] : no oral pallor [No Oral Cyanosis] : no oral cyanosis [Normal Jugular Venous A Waves Present] : normal jugular venous A waves present [Normal Jugular Venous V Waves Present] : normal jugular venous V waves present [No Jugular Venous Keen A Waves] : no jugular venous keen A waves [Respiration, Rhythm And Depth] : normal respiratory rhythm and effort [Exaggerated Use Of Accessory Muscles For Inspiration] : no accessory muscle use [Auscultation Breath Sounds / Voice Sounds] : lungs were clear to auscultation bilaterally [Abdomen Soft] : soft [Abdomen Tenderness] : non-tender [Abdomen Mass (___ Cm)] : no abdominal mass palpated [Abnormal Walk] : normal gait [Nail Clubbing] : no clubbing of the fingernails [Cyanosis, Localized] : no localized cyanosis [Petechial Hemorrhages (___cm)] : no petechial hemorrhages [Skin Color & Pigmentation] : normal skin color and pigmentation [] : no rash [No Venous Stasis] : no venous stasis [Skin Lesions] : no skin lesions [Oriented To Time, Place, And Person] : oriented to person, place, and time [Affect] : the affect was normal [Mood] : the mood was normal [No Anxiety] : not feeling anxious [Normal Rate] : normal [Rhythm Regular] : regular [Normal S1] : normal S1 [Normal S2] : normal S2 [I] : a grade 1 [1+] : left 1+ [Right Carotid Bruit] : no bruit heard over the right carotid [Left Carotid Bruit] : no bruit heard over the left carotid [___ +] : bilateral [unfilled]U+ pretibial pitting edema [Rt] : varicose veins of the right leg noted [Lt] : varicose veins of the left leg noted

## 2018-11-26 NOTE — REVIEW OF SYSTEMS
[Feeling Fatigued] : not feeling fatigued [see HPI] : see HPI [Shortness Of Breath] : no shortness of breath [Dyspnea on exertion] : not dyspnea during exertion [Chest  Pressure] : no chest pressure [Chest Pain] : no chest pain [Lower Ext Edema] : no extremity edema [Leg Claudication] : no intermittent leg claudication [Palpitations] : palpitations [Change In The Stool] : change in stool [Joint Pain] : joint pain [Joint Stiffness] : joint stiffness [Dizziness] : dizziness [Under Stress] : not under stress [Negative] : Heme/Lymph

## 2018-11-26 NOTE — DISCUSSION/SUMMARY
[FreeTextEntry1] : 78 year old woman with a history as listed above presents for a followup visit. \par Carolina is doing relatively well. She denies any anginal symptoms. Clinically she is not in decompensated heart failure. Her lower extremity edema has  resolved  Her physical exam was essentially unrevealing for any significant cardiovascular findings. Her EKG is unchanged from previous. \par \par her dyspnea is likely a function of her deconditioning. She will try to exercise more but likely will be limited by her hip issues. She will continue with aquatherapy. \par \par In 2/2018 her lipid profile was at goal. \par \par Her dizziness appears to be related to positional changes which is also improving now that she is off of Lasix. She will take it on a PRN basis. She will maintain her po hydration and request that she wears her compression stockings daily. \par \par  Her blood pressure is controlled on her ABPM. I would defer any antiHTN meds at this time. \par \par Her Hb has been stable since the last PRBC transfusion.  If her hemoglobin is still under 8 she should get a blood transfusion. She is now on Procrit. She will continue to followup with heme. \par \par It appears that at least some of her symptoms were previously from bradycardia.  It will be difficult to stop the Amio given that she is so symptomatic for AF. She will continue  Amiodarone  100mg Qday. She will stay off of  the Lopressor for now. I will monitor for AF. She is at high risk for an AF ablation. At this time I don’t think she needs a pacemaker. She today remains in SR. \par \par She will have her INR checked in our [facility]. Her goal INR is 2-3 for CVA risk reduction and VTE prevention. \par \par She will followup with pulmonary (Dr. Gallegos). her last PFTs showed a moderate reduction in diffusion in 11/2017. She will need another one later this year. \par \par Her TSH was normal in 2/2018 She needs this checked annually as well. She went to opthalmology recently and had a normal check. \par I will repeat her TSH given her alopecia. She will need to followup with endocrine for her parathyroid issues. Likely the alopecia is secondary to the Tramadol increase. She will decrease her total daily dose to 125mg from 150mg. \par \par She will followup with me in 3 month. She will followup with you for all of her other medical needs. \par

## 2018-11-27 LAB
INR PPP: 2.5 RATIO
QUALITY CONTROL: YES

## 2018-12-17 ENCOUNTER — APPOINTMENT (OUTPATIENT)
Dept: INTERNAL MEDICINE | Facility: CLINIC | Age: 78
End: 2018-12-17
Payer: MEDICARE

## 2018-12-17 VITALS
OXYGEN SATURATION: 95 % | DIASTOLIC BLOOD PRESSURE: 70 MMHG | SYSTOLIC BLOOD PRESSURE: 130 MMHG | TEMPERATURE: 98.9 F | RESPIRATION RATE: 14 BRPM | BODY MASS INDEX: 34.83 KG/M2 | WEIGHT: 204 LBS | HEART RATE: 69 BPM | HEIGHT: 64 IN

## 2018-12-17 LAB
INR PPP: 3 RATIO
POCT-PROTHROMBIN TIME: 36.6 SECS
QUALITY CONTROL: YES

## 2018-12-17 PROCEDURE — 36415 COLL VENOUS BLD VENIPUNCTURE: CPT

## 2018-12-17 PROCEDURE — 85610 PROTHROMBIN TIME: CPT | Mod: QW

## 2018-12-17 PROCEDURE — 99214 OFFICE O/P EST MOD 30 MIN: CPT | Mod: 25

## 2018-12-17 NOTE — HISTORY OF PRESENT ILLNESS
[FreeTextEntry1] : Here for follow up for  atrial fibrillation to check INR\par has avascular necrosis right hip \par has diabetes off medications

## 2018-12-18 LAB
ALBUMIN SERPL ELPH-MCNC: 4.4 G/DL
ALP BLD-CCNC: 83 U/L
ALT SERPL-CCNC: 11 U/L
ANION GAP SERPL CALC-SCNC: 10 MMOL/L
AST SERPL-CCNC: 16 U/L
BILIRUB SERPL-MCNC: 0.4 MG/DL
BUN SERPL-MCNC: 23 MG/DL
CALCIUM SERPL-MCNC: 10.5 MG/DL
CALCIUM SERPL-MCNC: 10.5 MG/DL
CHLORIDE SERPL-SCNC: 102 MMOL/L
CO2 SERPL-SCNC: 30 MMOL/L
CREAT SERPL-MCNC: 1.37 MG/DL
ESTIMATED AVERAGE GLUCOSE: 103 MG/DL
GLUCOSE SERPL-MCNC: 77 MG/DL
HBA1C MFR BLD HPLC: 5.2 %
PARATHYROID HORMONE INTACT: 120 PG/ML
POTASSIUM SERPL-SCNC: 5.6 MMOL/L
PROT SERPL-MCNC: 7.1 G/DL
SODIUM SERPL-SCNC: 142 MMOL/L
TSH SERPL-ACNC: 2.61 UIU/ML

## 2018-12-19 ENCOUNTER — RX RENEWAL (OUTPATIENT)
Age: 78
End: 2018-12-19

## 2018-12-21 ENCOUNTER — APPOINTMENT (OUTPATIENT)
Dept: CARDIOLOGY | Facility: CLINIC | Age: 78
End: 2018-12-21

## 2018-12-26 ENCOUNTER — APPOINTMENT (OUTPATIENT)
Dept: CARDIOLOGY | Facility: CLINIC | Age: 78
End: 2018-12-26
Payer: MEDICARE

## 2018-12-26 VITALS — HEIGHT: 64 IN | HEART RATE: 69 BPM | BODY MASS INDEX: 34.83 KG/M2 | WEIGHT: 204 LBS

## 2018-12-26 LAB
INR PPP: 2.5 RATIO
QUALITY CONTROL: YES

## 2018-12-26 PROCEDURE — 85610 PROTHROMBIN TIME: CPT | Mod: QW

## 2018-12-26 PROCEDURE — 93793 ANTICOAG MGMT PT WARFARIN: CPT

## 2019-01-18 ENCOUNTER — RX RENEWAL (OUTPATIENT)
Age: 79
End: 2019-01-18

## 2019-01-21 ENCOUNTER — RX RENEWAL (OUTPATIENT)
Age: 79
End: 2019-01-21

## 2019-01-23 ENCOUNTER — APPOINTMENT (OUTPATIENT)
Dept: CARDIOLOGY | Facility: CLINIC | Age: 79
End: 2019-01-23
Payer: MEDICARE

## 2019-01-23 VITALS — HEIGHT: 64 IN | BODY MASS INDEX: 34.83 KG/M2 | WEIGHT: 204 LBS | HEART RATE: 69 BPM

## 2019-01-23 LAB
INR PPP: 2.5 RATIO
QUALITY CONTROL: YES

## 2019-01-23 PROCEDURE — 93793 ANTICOAG MGMT PT WARFARIN: CPT

## 2019-01-23 PROCEDURE — 85610 PROTHROMBIN TIME: CPT | Mod: QW

## 2019-02-04 ENCOUNTER — APPOINTMENT (OUTPATIENT)
Dept: INTERNAL MEDICINE | Facility: CLINIC | Age: 79
End: 2019-02-04
Payer: MEDICARE

## 2019-02-04 ENCOUNTER — NON-APPOINTMENT (OUTPATIENT)
Age: 79
End: 2019-02-04

## 2019-02-04 VITALS
RESPIRATION RATE: 14 BRPM | HEART RATE: 68 BPM | SYSTOLIC BLOOD PRESSURE: 120 MMHG | WEIGHT: 204 LBS | DIASTOLIC BLOOD PRESSURE: 76 MMHG | TEMPERATURE: 97.9 F | OXYGEN SATURATION: 95 % | HEIGHT: 64 IN | BODY MASS INDEX: 34.83 KG/M2

## 2019-02-04 DIAGNOSIS — R07.89 OTHER CHEST PAIN: ICD-10-CM

## 2019-02-04 DIAGNOSIS — K44.9 DIAPHRAGMATIC HERNIA W/OUT OBSTRUCTION OR GANGRENE: ICD-10-CM

## 2019-02-04 PROCEDURE — 93000 ELECTROCARDIOGRAM COMPLETE: CPT

## 2019-02-04 PROCEDURE — 99214 OFFICE O/P EST MOD 30 MIN: CPT | Mod: 25

## 2019-02-04 NOTE — HISTORY OF PRESENT ILLNESS
[FreeTextEntry8] : has gas pains for 3 days\par no SOB\par has chest pains\par no fever or chills\par has burping eating makes it worse\par takes Nexium

## 2019-02-20 ENCOUNTER — APPOINTMENT (OUTPATIENT)
Dept: CARDIOLOGY | Facility: CLINIC | Age: 79
End: 2019-02-20
Payer: MEDICARE

## 2019-02-20 VITALS — HEIGHT: 64 IN | BODY MASS INDEX: 34.83 KG/M2 | WEIGHT: 204 LBS | HEART RATE: 68 BPM

## 2019-02-20 LAB
INR PPP: 2.4 RATIO
QUALITY CONTROL: YES

## 2019-02-20 PROCEDURE — 93793 ANTICOAG MGMT PT WARFARIN: CPT

## 2019-02-20 PROCEDURE — 85610 PROTHROMBIN TIME: CPT | Mod: QW

## 2019-03-02 ENCOUNTER — TRANSCRIPTION ENCOUNTER (OUTPATIENT)
Age: 79
End: 2019-03-02

## 2019-03-20 ENCOUNTER — APPOINTMENT (OUTPATIENT)
Dept: CARDIOLOGY | Facility: CLINIC | Age: 79
End: 2019-03-20
Payer: MEDICARE

## 2019-03-20 VITALS — BODY MASS INDEX: 34.83 KG/M2 | HEART RATE: 68 BPM | WEIGHT: 204 LBS | HEIGHT: 64 IN

## 2019-03-20 LAB
INR PPP: 2.4 RATIO
QUALITY CONTROL: YES

## 2019-03-20 PROCEDURE — 85610 PROTHROMBIN TIME: CPT | Mod: QW

## 2019-03-20 PROCEDURE — 93793 ANTICOAG MGMT PT WARFARIN: CPT

## 2019-04-17 ENCOUNTER — APPOINTMENT (OUTPATIENT)
Dept: CARDIOLOGY | Facility: CLINIC | Age: 79
End: 2019-04-17
Payer: MEDICARE

## 2019-04-17 VITALS — HEIGHT: 64 IN | BODY MASS INDEX: 34.83 KG/M2 | WEIGHT: 204 LBS | HEART RATE: 68 BPM

## 2019-04-17 LAB
INR PPP: 2.2 RATIO
QUALITY CONTROL: YES

## 2019-04-17 PROCEDURE — 93793 ANTICOAG MGMT PT WARFARIN: CPT

## 2019-04-17 PROCEDURE — 85610 PROTHROMBIN TIME: CPT | Mod: QW

## 2019-05-09 ENCOUNTER — LABORATORY RESULT (OUTPATIENT)
Age: 79
End: 2019-05-09

## 2019-05-09 ENCOUNTER — APPOINTMENT (OUTPATIENT)
Dept: CARDIOLOGY | Facility: CLINIC | Age: 79
End: 2019-05-09
Payer: MEDICARE

## 2019-05-09 VITALS
HEART RATE: 64 BPM | BODY MASS INDEX: 35.34 KG/M2 | WEIGHT: 207 LBS | SYSTOLIC BLOOD PRESSURE: 120 MMHG | DIASTOLIC BLOOD PRESSURE: 75 MMHG | OXYGEN SATURATION: 95 % | HEIGHT: 64 IN

## 2019-05-09 VITALS — DIASTOLIC BLOOD PRESSURE: 75 MMHG | HEART RATE: 64 BPM | SYSTOLIC BLOOD PRESSURE: 120 MMHG | OXYGEN SATURATION: 95 %

## 2019-05-09 LAB
INR PPP: 2.4 RATIO
QUALITY CONTROL: YES

## 2019-05-09 PROCEDURE — 99214 OFFICE O/P EST MOD 30 MIN: CPT

## 2019-05-09 PROCEDURE — 85610 PROTHROMBIN TIME: CPT | Mod: QW

## 2019-05-09 RX ORDER — AMIODARONE HYDROCHLORIDE 100 MG/1
100 TABLET ORAL
Qty: 90 | Refills: 0 | Status: DISCONTINUED | COMMUNITY
Start: 2017-03-06 | End: 2019-05-09

## 2019-05-09 NOTE — REVIEW OF SYSTEMS
[Feeling Fatigued] : not feeling fatigued [Dyspnea on exertion] : not dyspnea during exertion [Shortness Of Breath] : no shortness of breath [see HPI] : see HPI [Chest  Pressure] : no chest pressure [Chest Pain] : no chest pain [Leg Claudication] : no intermittent leg claudication [Lower Ext Edema] : no extremity edema [Palpitations] : palpitations [Change In The Stool] : change in stool [Joint Pain] : joint pain [Under Stress] : not under stress [Dizziness] : dizziness [Joint Stiffness] : joint stiffness [Negative] : Heme/Lymph

## 2019-05-09 NOTE — CARDIOLOGY SUMMARY
[___] : [unfilled] [No Ischemia] : no Ischemia [Normal] : normal LA size [None] : normal LV function [Mild] : mild mitral regurgitation

## 2019-05-09 NOTE — HISTORY OF PRESENT ILLNESS
[FreeTextEntry1] : 78-year-old woman with a history of GI bleeding , atrial fibrillation  status post pulmonary vein isolation at [place] not on anticoagulation, COPD, diabetes, IPF, granuloma annulari (resolves with steroids) palpations. An event monitor demonstrated that her symptoms correlate to PAF/PAT episodes and APCs.\par we started her on Coumadin but even before she was therapeutic she started having bouts of rectal bleeding. Her Coumadin was then discontinued.\par After speaking to her colorectal surgeon we resumed her AC. \par \par \par in July of 2016 she had a fall  with hip fracture. Her course was cough with treated by an SBO requiring an exlap. She had a  left leg DVT and PE. She had a IVC filter placed. She had a  spontaneous hematoma develop and was taken off of AC. She also had multiple transfusions. Her Sotalol was discontinued and she was put on Amiodarone in order to help maintain SR rhythm. \par \par She was discharged and then developed a right leg DVT in rehab on 10/2016. She had been restarted on anticoagulation. She reports already requiring 3 PRBC transfusions in rehab. \par \par She had right hand carpal tunnel surgery on 6/27/17 at  with resulting hematoma while on Lovenox bridge. \par \par Her ABPM showed an average BP of 132/72.\par \par She is now here for a followup visit.\par Since her last visit, she today woke up with extreme left hip pain which is new. She normally complains of right hip pain. She is known to have avascular necrosis of the left femoral head. \par \par She has been doing the aqua therapy and really helps her. \par  \par She is still getting lightheaded about 3 times a week.  It occurs when standing up from a seated position.  She denies any syncope or falls.  \par Her lower extremity edema has essentially resolved. She is drinking at least about 3 16oz bottles. \par  \par She gets intermittent palpitations lasting for seconds and self limiting. It happens about 3-5x a week now. \par she denies any dyspnea. \par \par She  denies any chest pain, PND, orthopnea,   syncope, strokelike symptoms. She is compliant with her other medications. \par No reported melena, hematochezia or hematemesis.  \par Her Hb has been stable between 10-11. She is still getting Procrit.

## 2019-05-09 NOTE — PHYSICAL EXAM
[General Appearance - Well Developed] : well developed [General Appearance - Well Nourished] : well nourished [Eyelids - No Xanthelasma] : the eyelids demonstrated no xanthelasmas [Normal Conjunctiva] : the conjunctiva exhibited no abnormalities [Normal Oral Mucosa] : normal oral mucosa [No Oral Cyanosis] : no oral cyanosis [No Oral Pallor] : no oral pallor [Normal Jugular Venous A Waves Present] : normal jugular venous A waves present [Normal Jugular Venous V Waves Present] : normal jugular venous V waves present [No Jugular Venous Keen A Waves] : no jugular venous keen A waves [Respiration, Rhythm And Depth] : normal respiratory rhythm and effort [Abdomen Soft] : soft [Exaggerated Use Of Accessory Muscles For Inspiration] : no accessory muscle use [Auscultation Breath Sounds / Voice Sounds] : lungs were clear to auscultation bilaterally [Abdomen Tenderness] : non-tender [Abdomen Mass (___ Cm)] : no abdominal mass palpated [Nail Clubbing] : no clubbing of the fingernails [Abnormal Walk] : normal gait [Petechial Hemorrhages (___cm)] : no petechial hemorrhages [Cyanosis, Localized] : no localized cyanosis [Skin Color & Pigmentation] : normal skin color and pigmentation [] : no rash [No Venous Stasis] : no venous stasis [Affect] : the affect was normal [Oriented To Time, Place, And Person] : oriented to person, place, and time [Skin Lesions] : no skin lesions [Normal Rate] : normal [Mood] : the mood was normal [No Anxiety] : not feeling anxious [Rhythm Regular] : regular [Normal S1] : normal S1 [I] : a grade 1 [Normal S2] : normal S2 [Right Carotid Bruit] : no bruit heard over the right carotid [1+] : left 1+ [Left Carotid Bruit] : no bruit heard over the left carotid [Rt] : varicose veins of the right leg noted [No Pitting Edema] : no pitting edema present [Lt] : varicose veins of the left leg noted

## 2019-05-10 LAB
25(OH)D3 SERPL-MCNC: 34.1 NG/ML
ALBUMIN SERPL ELPH-MCNC: 4.6 G/DL
ALP BLD-CCNC: 73 U/L
ALT SERPL-CCNC: 11 U/L
ANION GAP SERPL CALC-SCNC: 17 MMOL/L
AST SERPL-CCNC: 15 U/L
BASOPHILS # BLD AUTO: 0.03 K/UL
BASOPHILS NFR BLD AUTO: 0.4 %
BILIRUB SERPL-MCNC: 0.4 MG/DL
BUN SERPL-MCNC: 26 MG/DL
CALCIUM SERPL-MCNC: 10.2 MG/DL
CHLORIDE SERPL-SCNC: 103 MMOL/L
CHOLEST SERPL-MCNC: 144 MG/DL
CHOLEST/HDLC SERPL: 3.1 RATIO
CO2 SERPL-SCNC: 21 MMOL/L
CREAT SERPL-MCNC: 1.41 MG/DL
EOSINOPHIL # BLD AUTO: 0.02 K/UL
EOSINOPHIL NFR BLD AUTO: 0.3 %
ESTIMATED AVERAGE GLUCOSE: 97 MG/DL
FOLATE SERPL-MCNC: >20 NG/ML
GLUCOSE SERPL-MCNC: 85 MG/DL
HBA1C MFR BLD HPLC: 5 %
HCT VFR BLD CALC: 32.4 %
HDLC SERPL-MCNC: 46 MG/DL
HGB BLD-MCNC: 10 G/DL
IMM GRANULOCYTES NFR BLD AUTO: 1.3 %
LDLC SERPL CALC-MCNC: 77 MG/DL
LYMPHOCYTES # BLD AUTO: 1.58 K/UL
LYMPHOCYTES NFR BLD AUTO: 20.3 %
MAGNESIUM SERPL-MCNC: 1.9 MG/DL
MAN DIFF?: NORMAL
MCHC RBC-ENTMCNC: 30.9 GM/DL
MCHC RBC-ENTMCNC: 32.7 PG
MCV RBC AUTO: 105.9 FL
MONOCYTES # BLD AUTO: 0.76 K/UL
MONOCYTES NFR BLD AUTO: 9.8 %
NEUTROPHILS # BLD AUTO: 5.28 K/UL
NEUTROPHILS NFR BLD AUTO: 67.9 %
PLATELET # BLD AUTO: 226 K/UL
POTASSIUM SERPL-SCNC: 4.8 MMOL/L
PROT SERPL-MCNC: 6.9 G/DL
RBC # BLD: 3.06 M/UL
RBC # FLD: 20.6 %
SODIUM SERPL-SCNC: 141 MMOL/L
TRIGL SERPL-MCNC: 106 MG/DL
TSH SERPL-ACNC: 2.65 UIU/ML
VIT B12 SERPL-MCNC: 899 PG/ML
WBC # FLD AUTO: 7.77 K/UL

## 2019-05-13 ENCOUNTER — APPOINTMENT (OUTPATIENT)
Dept: INTERNAL MEDICINE | Facility: CLINIC | Age: 79
End: 2019-05-13
Payer: MEDICARE

## 2019-05-13 VITALS
SYSTOLIC BLOOD PRESSURE: 130 MMHG | OXYGEN SATURATION: 93 % | DIASTOLIC BLOOD PRESSURE: 70 MMHG | TEMPERATURE: 97.8 F | HEIGHT: 64 IN | HEART RATE: 68 BPM | RESPIRATION RATE: 14 BRPM

## 2019-05-13 DIAGNOSIS — M87.051 IDIOPATHIC ASEPTIC NECROSIS OF RIGHT FEMUR: ICD-10-CM

## 2019-05-13 DIAGNOSIS — M25.552 PAIN IN LEFT HIP: ICD-10-CM

## 2019-05-13 PROCEDURE — 99214 OFFICE O/P EST MOD 30 MIN: CPT

## 2019-05-13 NOTE — PHYSICAL EXAM
[No Acute Distress] : no acute distress [Well Developed] : well developed [Well Nourished] : well nourished [Well-Appearing] : well-appearing [PERRL] : pupils equal round and reactive to light [Normal Sclera/Conjunctiva] : normal sclera/conjunctiva [EOMI] : extraocular movements intact [Normal Outer Ear/Nose] : the outer ears and nose were normal in appearance [Normal Oropharynx] : the oropharynx was normal [No JVD] : no jugular venous distention [Supple] : supple [No Lymphadenopathy] : no lymphadenopathy [No Respiratory Distress] : no respiratory distress  [Thyroid Normal, No Nodules] : the thyroid was normal and there were no nodules present [No Accessory Muscle Use] : no accessory muscle use [Clear to Auscultation] : lungs were clear to auscultation bilaterally [Normal Rate] : normal rate  [Regular Rhythm] : with a regular rhythm [Normal S1, S2] : normal S1 and S2 [No Carotid Bruits] : no carotid bruits [No Murmur] : no murmur heard [No Abdominal Bruit] : a ~M bruit was not heard ~T in the abdomen [No Varicosities] : no varicosities [Pedal Pulses Present] : the pedal pulses are present [No Edema] : there was no peripheral edema [No Extremity Clubbing/Cyanosis] : no extremity clubbing/cyanosis [Soft] : abdomen soft [No Palpable Aorta] : no palpable aorta [Non Tender] : non-tender [Non-distended] : non-distended [No HSM] : no HSM [No Masses] : no abdominal mass palpated [Normal Posterior Cervical Nodes] : no posterior cervical lymphadenopathy [Normal Bowel Sounds] : normal bowel sounds [Normal Anterior Cervical Nodes] : no anterior cervical lymphadenopathy [No Joint Swelling] : no joint swelling [No Spinal Tenderness] : no spinal tenderness [No CVA Tenderness] : no CVA  tenderness [Grossly Normal Strength/Tone] : grossly normal strength/tone [No Rash] : no rash [Normal Gait] : normal gait [Coordination Grossly Intact] : coordination grossly intact [Deep Tendon Reflexes (DTR)] : deep tendon reflexes were 2+ and symmetric [No Focal Deficits] : no focal deficits [Normal Affect] : the affect was normal [Normal Insight/Judgement] : insight and judgment were intact

## 2019-06-03 ENCOUNTER — APPOINTMENT (OUTPATIENT)
Dept: CARDIOLOGY | Facility: CLINIC | Age: 79
End: 2019-06-03
Payer: MEDICARE

## 2019-06-03 VITALS — HEART RATE: 68 BPM | BODY MASS INDEX: 35.34 KG/M2 | WEIGHT: 207 LBS | HEIGHT: 64 IN

## 2019-06-03 LAB
INR PPP: 3.2 RATIO
QUALITY CONTROL: YES

## 2019-06-03 PROCEDURE — 85610 PROTHROMBIN TIME: CPT | Mod: QW

## 2019-06-03 PROCEDURE — 93793 ANTICOAG MGMT PT WARFARIN: CPT

## 2019-06-05 ENCOUNTER — RX RENEWAL (OUTPATIENT)
Age: 79
End: 2019-06-05

## 2019-06-18 ENCOUNTER — APPOINTMENT (OUTPATIENT)
Dept: INTERNAL MEDICINE | Facility: CLINIC | Age: 79
End: 2019-06-18
Payer: MEDICARE

## 2019-06-18 VITALS
OXYGEN SATURATION: 95 % | HEART RATE: 69 BPM | TEMPERATURE: 97.9 F | DIASTOLIC BLOOD PRESSURE: 70 MMHG | HEIGHT: 64 IN | RESPIRATION RATE: 14 BRPM | BODY MASS INDEX: 35.34 KG/M2 | SYSTOLIC BLOOD PRESSURE: 130 MMHG | WEIGHT: 207 LBS

## 2019-06-18 PROCEDURE — 99214 OFFICE O/P EST MOD 30 MIN: CPT

## 2019-06-18 NOTE — HISTORY OF PRESENT ILLNESS
[FreeTextEntry8] : has dizziness now now on Tramadol \par rah on left side of body \par was unable to tolerate Elavil and Cymbalta\par started 3 days ago

## 2019-06-18 NOTE — PHYSICAL EXAM

## 2019-06-24 ENCOUNTER — APPOINTMENT (OUTPATIENT)
Dept: CARDIOLOGY | Facility: CLINIC | Age: 79
End: 2019-06-24
Payer: MEDICARE

## 2019-06-24 VITALS — HEART RATE: 69 BPM | SYSTOLIC BLOOD PRESSURE: 130 MMHG | DIASTOLIC BLOOD PRESSURE: 70 MMHG | OXYGEN SATURATION: 96 %

## 2019-06-24 LAB
INR PPP: 3.6 RATIO
QUALITY CONTROL: YES

## 2019-06-24 PROCEDURE — 85610 PROTHROMBIN TIME: CPT | Mod: QW

## 2019-06-24 PROCEDURE — 93793 ANTICOAG MGMT PT WARFARIN: CPT

## 2019-07-08 ENCOUNTER — APPOINTMENT (OUTPATIENT)
Dept: CARDIOLOGY | Facility: CLINIC | Age: 79
End: 2019-07-08

## 2019-07-12 ENCOUNTER — APPOINTMENT (OUTPATIENT)
Dept: INTERNAL MEDICINE | Facility: CLINIC | Age: 79
End: 2019-07-12
Payer: MEDICARE

## 2019-07-12 VITALS
DIASTOLIC BLOOD PRESSURE: 60 MMHG | TEMPERATURE: 98.7 F | OXYGEN SATURATION: 95 % | SYSTOLIC BLOOD PRESSURE: 110 MMHG | RESPIRATION RATE: 14 BRPM | HEART RATE: 74 BPM | HEIGHT: 64 IN

## 2019-07-12 DIAGNOSIS — M25.551 PAIN IN RIGHT HIP: ICD-10-CM
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
## 2019-07-12 DIAGNOSIS — M25.552 PAIN IN RIGHT HIP: ICD-10-CM

## 2019-07-12 LAB
INR PPP: 3 RATIO
POCT-PROTHROMBIN TIME: 36.1 SECS
QUALITY CONTROL: YES

## 2019-07-12 PROCEDURE — 85610 PROTHROMBIN TIME: CPT | Mod: QW

## 2019-07-12 PROCEDURE — 99214 OFFICE O/P EST MOD 30 MIN: CPT | Mod: 25

## 2019-07-12 NOTE — PHYSICAL EXAM
[No Acute Distress] : no acute distress [Well Developed] : well developed [Well Nourished] : well nourished [Well-Appearing] : well-appearing [Normal Sclera/Conjunctiva] : normal sclera/conjunctiva [PERRL] : pupils equal round and reactive to light [EOMI] : extraocular movements intact [Normal Outer Ear/Nose] : the outer ears and nose were normal in appearance [Normal Oropharynx] : the oropharynx was normal [No JVD] : no jugular venous distention [No Lymphadenopathy] : no lymphadenopathy [Supple] : supple [Thyroid Normal, No Nodules] : the thyroid was normal and there were no nodules present [No Respiratory Distress] : no respiratory distress  [No Accessory Muscle Use] : no accessory muscle use [Clear to Auscultation] : lungs were clear to auscultation bilaterally [Normal Rate] : normal rate  [Regular Rhythm] : with a regular rhythm [Normal S1, S2] : normal S1 and S2 [I] : a grade 1 [No Carotid Bruits] : no carotid bruits [No Varicosities] : no varicosities [No Abdominal Bruit] : a ~M bruit was not heard ~T in the abdomen [Pedal Pulses Present] : the pedal pulses are present [No Edema] : there was no peripheral edema [No Extremity Clubbing/Cyanosis] : no extremity clubbing/cyanosis [No Palpable Aorta] : no palpable aorta [Soft] : abdomen soft [Non Tender] : non-tender [Non-distended] : non-distended [No Masses] : no abdominal mass palpated [No HSM] : no HSM [Normal Posterior Cervical Nodes] : no posterior cervical lymphadenopathy [Normal Bowel Sounds] : normal bowel sounds [Normal Anterior Cervical Nodes] : no anterior cervical lymphadenopathy [No CVA Tenderness] : no CVA  tenderness [No Spinal Tenderness] : no spinal tenderness [No Joint Swelling] : no joint swelling [Grossly Normal Strength/Tone] : grossly normal strength/tone [No Rash] : no rash [Coordination Grossly Intact] : coordination grossly intact [No Focal Deficits] : no focal deficits [Normal Gait] : normal gait [Deep Tendon Reflexes (DTR)] : deep tendon reflexes were 2+ and symmetric [Normal Affect] : the affect was normal [Normal Insight/Judgement] : insight and judgment were intact [de-identified] : bite right arm

## 2019-07-12 NOTE — HISTORY OF PRESENT ILLNESS
[FreeTextEntry8] : Has bite right arm noticed 5 days ago itchy \par no fever or chills  \par Also hip pains left greater than right

## 2019-07-12 NOTE — REVIEW OF SYSTEMS
[Joint Pain] : joint pain [Itching] : Itching [Muscle Pain] : muscle pain [Skin Rash] : skin rash [Negative] : Heme/Lymph

## 2019-07-17 ENCOUNTER — APPOINTMENT (OUTPATIENT)
Dept: CARDIOLOGY | Facility: CLINIC | Age: 79
End: 2019-07-17
Payer: MEDICARE

## 2019-07-17 VITALS — BODY MASS INDEX: 35.34 KG/M2 | WEIGHT: 207 LBS | HEIGHT: 64 IN | HEART RATE: 74 BPM

## 2019-07-17 LAB
INR PPP: 2.4 RATIO
QUALITY CONTROL: YES

## 2019-07-17 PROCEDURE — 85610 PROTHROMBIN TIME: CPT | Mod: QW

## 2019-07-17 PROCEDURE — 93793 ANTICOAG MGMT PT WARFARIN: CPT

## 2019-08-07 ENCOUNTER — APPOINTMENT (OUTPATIENT)
Dept: CARDIOLOGY | Facility: CLINIC | Age: 79
End: 2019-08-07
Payer: MEDICARE

## 2019-08-07 VITALS — HEART RATE: 74 BPM | BODY MASS INDEX: 35.34 KG/M2 | WEIGHT: 207 LBS | HEIGHT: 64 IN

## 2019-08-07 LAB
INR PPP: 2.3 RATIO
QUALITY CONTROL: YES

## 2019-08-07 PROCEDURE — 93793 ANTICOAG MGMT PT WARFARIN: CPT

## 2019-08-07 PROCEDURE — 85610 PROTHROMBIN TIME: CPT | Mod: QW

## 2019-08-12 ENCOUNTER — APPOINTMENT (OUTPATIENT)
Dept: CARDIOLOGY | Facility: CLINIC | Age: 79
End: 2019-08-12
Payer: MEDICARE

## 2019-08-12 ENCOUNTER — NON-APPOINTMENT (OUTPATIENT)
Age: 79
End: 2019-08-12

## 2019-08-12 VITALS
DIASTOLIC BLOOD PRESSURE: 80 MMHG | OXYGEN SATURATION: 98 % | BODY MASS INDEX: 35.51 KG/M2 | HEIGHT: 64 IN | WEIGHT: 208 LBS | HEART RATE: 57 BPM | SYSTOLIC BLOOD PRESSURE: 133 MMHG

## 2019-08-12 PROCEDURE — 93000 ELECTROCARDIOGRAM COMPLETE: CPT

## 2019-08-12 PROCEDURE — 99215 OFFICE O/P EST HI 40 MIN: CPT

## 2019-08-12 NOTE — REVIEW OF SYSTEMS
[see HPI] : see HPI [Palpitations] : palpitations [Change In The Stool] : change in stool [Joint Pain] : joint pain [Joint Stiffness] : joint stiffness [Dizziness] : dizziness [Negative] : Heme/Lymph [Feeling Fatigued] : not feeling fatigued [Shortness Of Breath] : no shortness of breath [Dyspnea on exertion] : not dyspnea during exertion [Chest Pain] : no chest pain [Chest  Pressure] : no chest pressure [Lower Ext Edema] : no extremity edema [Leg Claudication] : no intermittent leg claudication [Under Stress] : not under stress

## 2019-08-12 NOTE — PHYSICAL EXAM
[General Appearance - Well Developed] : well developed [General Appearance - Well Nourished] : well nourished [Normal Conjunctiva] : the conjunctiva exhibited no abnormalities [Eyelids - No Xanthelasma] : the eyelids demonstrated no xanthelasmas [Normal Oral Mucosa] : normal oral mucosa [No Oral Pallor] : no oral pallor [No Oral Cyanosis] : no oral cyanosis [Normal Jugular Venous A Waves Present] : normal jugular venous A waves present [Normal Jugular Venous V Waves Present] : normal jugular venous V waves present [No Jugular Venous Keen A Waves] : no jugular venous keen A waves [Respiration, Rhythm And Depth] : normal respiratory rhythm and effort [Exaggerated Use Of Accessory Muscles For Inspiration] : no accessory muscle use [Auscultation Breath Sounds / Voice Sounds] : lungs were clear to auscultation bilaterally [Abdomen Soft] : soft [Abdomen Tenderness] : non-tender [Abdomen Mass (___ Cm)] : no abdominal mass palpated [Abnormal Walk] : normal gait [Nail Clubbing] : no clubbing of the fingernails [Cyanosis, Localized] : no localized cyanosis [Petechial Hemorrhages (___cm)] : no petechial hemorrhages [Skin Color & Pigmentation] : normal skin color and pigmentation [] : no rash [No Venous Stasis] : no venous stasis [Skin Lesions] : no skin lesions [Oriented To Time, Place, And Person] : oriented to person, place, and time [Affect] : the affect was normal [Mood] : the mood was normal [No Anxiety] : not feeling anxious [Normal Rate] : normal [Normal S1] : normal S1 [Rhythm Regular] : regular [Normal S2] : normal S2 [I] : a grade 1 [1+] : left 1+ [No Pitting Edema] : no pitting edema present [Rt] : varicose veins of the right leg noted [Lt] : varicose veins of the left leg noted [Left Carotid Bruit] : no bruit heard over the left carotid [Right Carotid Bruit] : no bruit heard over the right carotid

## 2019-08-12 NOTE — DISCUSSION/SUMMARY
[FreeTextEntry1] : 78 year old woman with a history as listed above presents for a followup visit. \par \par Carolina is complaining of more dizziness lightheadedness which could be related to her underlying bradycardia. This may be from her Amio. It will be difficult to stop the Amio given that she is so symptomatic for AF. She will continue  Amiodarone 100mg Qday. She will stay off of  the Lopressor for now. I will monitor for AF. She is at high risk for an AF ablation She today remains in SR. She will get an en event monitor. If she has symptomatic bradycardia she may need a Micra. \par \par Her blood pressure is no longer low. She is maintaining good pressures and is staying hydrated. I doubt her symptoms are from orthostasis. \par \par She denies any anginal symptoms. Clinically she is not in decompensated heart failure. Her lower extremity edema has  resolved.  Her physical exam was essentially unrevealing for any significant cardiovascular findings. Her EKG is unchanged from previous. \par \par her dyspnea is likely a function of her deconditioning. She will try to exercise more but likely will be limited by her hip issues. She will continue with aquatherapy which has helped. \par \par In 2/2018 her lipid profile was at goal. \par \par  Her blood pressure is controlled on her ABPM. I would defer any antiHTN meds at this time. \par \par Her Hb has been stable since the last PRBC transfusion.  If her hemoglobin is still under 8 she should get a blood transfusion. She is now on Procrit. She will continue to followup with heme. \par \par She will have her INR checked in our [facility]. Her goal INR is 2-3 for CVA risk reduction and VTE prevention. \par \par She will followup with pulmonary (Dr. Gallegos). her last PFTs showed a moderate reduction in diffusion in 11/2017.  \par \par Her TSH was normal in 2/2018 She needs this checked annually as well. She went to opthalmology recently and had a normal check. \par She will have her baseline lab work, including lipids, done prior to the next visit. \par \par She will followup with me in 3 month. She will followup with you for all of her other medical needs. \par

## 2019-08-12 NOTE — HISTORY OF PRESENT ILLNESS
[FreeTextEntry1] : 78-year-old woman with a history of GI bleeding , atrial fibrillation  status post pulmonary vein isolation at [place] not on anticoagulation, COPD, diabetes, IPF, granuloma annulari (resolves with steroids) palpations. An event monitor demonstrated that her symptoms correlate to PAF/PAT episodes and APCs.\par we started her on Coumadin but even before she was therapeutic she started having bouts of rectal bleeding. Her Coumadin was then discontinued.\par After speaking to her colorectal surgeon we resumed her AC. \par \par \par in July of 2016 she had a fall  with hip fracture. Her course was cough with treated by an SBO requiring an exlap. She had a  left leg DVT and PE. She had a IVC filter placed. She had a  spontaneous hematoma develop and was taken off of AC. She also had multiple transfusions. Her Sotalol was discontinued and she was put on Amiodarone in order to help maintain SR rhythm. \par \par She was discharged and then developed a right leg DVT in rehab on 10/2016. She had been restarted on anticoagulation. She reports already requiring 3 PRBC transfusions in rehab. \par \par She had right hand carpal tunnel surgery on 6/27/17 at  with resulting hematoma while on Lovenox bridge. \par \par Her ABPM showed an average BP of 132/72.\par \par She is now here for a followup visit.\par Since her last visit, she has been complaining of increased frequency of lightheadedness. Her symptoms have been worsens for several weeks. She has been near syncopal on several occasions. Her symptoms last for a bout 30 seconds. She has had no LOC but claims a few times her legs were about to give out. Her blood pressure has been controlled. Her symptoms do not appear to be related to position. \par \par She has been doing the aqua therapy and really helps her. She is still having significant left hip pain. It was helped with prednisone but now is off. \par   \par Her lower extremity edema has essentially resolved. She is drinking at least about 3 16oz bottles. \par  \par She gets intermittent palpitations lasting for seconds and self limiting. It happens about 3-5x a week now. \par she denies any dyspnea. \par \par She  denies any chest pain, PND, orthopnea,  strokelike symptoms. She is compliant with her other medications. \par No reported melena, hematochezia or hematemesis.  \par Her Hb has been stable between 10-11. She is still getting Procrit.

## 2019-08-14 ENCOUNTER — APPOINTMENT (OUTPATIENT)
Dept: CARDIOLOGY | Facility: CLINIC | Age: 79
End: 2019-08-14
Payer: MEDICARE

## 2019-08-14 PROCEDURE — 93268 ECG RECORD/REVIEW: CPT

## 2019-08-22 ENCOUNTER — APPOINTMENT (OUTPATIENT)
Dept: CARDIOLOGY | Facility: CLINIC | Age: 79
End: 2019-08-22
Payer: MEDICARE

## 2019-08-22 PROCEDURE — 93306 TTE W/DOPPLER COMPLETE: CPT

## 2019-09-04 ENCOUNTER — APPOINTMENT (OUTPATIENT)
Dept: CARDIOLOGY | Facility: CLINIC | Age: 79
End: 2019-09-04

## 2019-09-06 ENCOUNTER — APPOINTMENT (OUTPATIENT)
Dept: CARDIOLOGY | Facility: CLINIC | Age: 79
End: 2019-09-06
Payer: MEDICARE

## 2019-09-06 VITALS — HEART RATE: 57 BPM | HEIGHT: 64 IN | WEIGHT: 208 LBS | BODY MASS INDEX: 35.51 KG/M2

## 2019-09-06 LAB
INR PPP: 2.4 RATIO
QUALITY CONTROL: YES

## 2019-09-06 PROCEDURE — 93793 ANTICOAG MGMT PT WARFARIN: CPT

## 2019-09-06 PROCEDURE — 85610 PROTHROMBIN TIME: CPT | Mod: QW

## 2019-09-25 ENCOUNTER — APPOINTMENT (OUTPATIENT)
Dept: INTERNAL MEDICINE | Facility: CLINIC | Age: 79
End: 2019-09-25
Payer: MEDICARE

## 2019-09-25 VITALS
OXYGEN SATURATION: 96 % | RESPIRATION RATE: 14 BRPM | TEMPERATURE: 99.1 F | HEART RATE: 67 BPM | DIASTOLIC BLOOD PRESSURE: 60 MMHG | SYSTOLIC BLOOD PRESSURE: 120 MMHG | HEIGHT: 64 IN

## 2019-09-25 DIAGNOSIS — Z00.00 ENCOUNTER FOR GENERAL ADULT MEDICAL EXAMINATION W/OUT ABNORMAL FINDINGS: ICD-10-CM

## 2019-09-25 LAB
INR PPP: 2.5 RATIO
POCT-PROTHROMBIN TIME: 29.7 SECS
QUALITY CONTROL: YES

## 2019-09-25 PROCEDURE — G0008: CPT

## 2019-09-25 PROCEDURE — G0439: CPT

## 2019-09-25 PROCEDURE — 90662 IIV NO PRSV INCREASED AG IM: CPT

## 2019-09-25 RX ORDER — CEPHALEXIN 250 MG/1
250 TABLET ORAL
Qty: 21 | Refills: 0 | Status: DISCONTINUED | COMMUNITY
Start: 2019-07-12 | End: 2019-09-25

## 2019-09-25 RX ORDER — PREDNISONE 5 MG/1
5 TABLET ORAL
Qty: 30 | Refills: 2 | Status: DISCONTINUED | COMMUNITY
Start: 2019-07-12 | End: 2019-09-25

## 2019-09-25 RX ORDER — DULOXETINE HYDROCHLORIDE 30 MG/1
30 CAPSULE, DELAYED RELEASE PELLETS ORAL
Qty: 60 | Refills: 0 | Status: DISCONTINUED | COMMUNITY
Start: 2019-05-20 | End: 2019-09-25

## 2019-09-25 NOTE — HEALTH RISK ASSESSMENT
[Good] : ~his/her~  mood as  good [No] : In the past 12 months have you used drugs other than those required for medical reasons? No [No falls in past year] : Patient reported no falls in the past year [0] : 2) Feeling down, depressed, or hopeless: Not at all (0) [None] : None [Retired] : retired [] :  [Fully functional (bathing, dressing, toileting, transferring, walking, feeding)] : Fully functional (bathing, dressing, toileting, transferring, walking, feeding) [Independent] : doing laundry [Some assistance needed] : using transportation [Carbon Monoxide Detector] : carbon monoxide detector [Smoke Detector] : smoke detector [Seat Belt] :  uses seat belt [] : No [Language] : denies difficulty with language [Guns at Home] : no guns at home [Reports changes in dental health] : Reports no changes in dental health [Travel to Developing Areas] : does not  travel to developing areas

## 2019-09-25 NOTE — PHYSICAL EXAM
[No Acute Distress] : no acute distress [Well Nourished] : well nourished [Well Developed] : well developed [Well-Appearing] : well-appearing [Normal Voice/Communication] : normal voice/communication [Normal Sclera/Conjunctiva] : normal sclera/conjunctiva [PERRL] : pupils equal round and reactive to light [EOMI] : extraocular movements intact [Normal Outer Ear/Nose] : the outer ears and nose were normal in appearance [Normal Oropharynx] : the oropharynx was normal [No JVD] : no jugular venous distention [No Lymphadenopathy] : no lymphadenopathy [Supple] : supple [Thyroid Normal, No Nodules] : the thyroid was normal and there were no nodules present [No Respiratory Distress] : no respiratory distress  [Clear to Auscultation] : lungs were clear to auscultation bilaterally [No Accessory Muscle Use] : no accessory muscle use [Normal Rate] : normal rate  [Regular Rhythm] : with a regular rhythm [Normal S1, S2] : normal S1 and S2 [I] : a grade 1 [No Carotid Bruits] : no carotid bruits [No Abdominal Bruit] : a ~M bruit was not heard ~T in the abdomen [No Varicosities] : no varicosities [No Edema] : there was no peripheral edema [Pedal Pulses Present] : the pedal pulses are present [No Palpable Aorta] : no palpable aorta [Soft] : abdomen soft [No Extremity Clubbing/Cyanosis] : no extremity clubbing/cyanosis [Non-distended] : non-distended [Non Tender] : non-tender [No Masses] : no abdominal mass palpated [No HSM] : no HSM [Normal Supraclavicular Nodes] : no supraclavicular lymphadenopathy [Normal Bowel Sounds] : normal bowel sounds [Normal Posterior Cervical Nodes] : no posterior cervical lymphadenopathy [No CVA Tenderness] : no CVA  tenderness [Normal Anterior Cervical Nodes] : no anterior cervical lymphadenopathy [No Joint Swelling] : no joint swelling [No Spinal Tenderness] : no spinal tenderness [Grossly Normal Strength/Tone] : grossly normal strength/tone [No Rash] : no rash [Coordination Grossly Intact] : coordination grossly intact [No Focal Deficits] : no focal deficits [Normal Gait] : normal gait [Deep Tendon Reflexes (DTR)] : deep tendon reflexes were 2+ and symmetric [Speech Grossly Normal] : speech grossly normal [Memory Grossly Normal] : memory grossly normal [Alert and Oriented x3] : oriented to person, place, and time [Normal Affect] : the affect was normal [Normal Mood] : the mood was normal [Normal Insight/Judgement] : insight and judgment were intact

## 2019-09-25 NOTE — REVIEW OF SYSTEMS
[Joint Stiffness] : joint stiffness [Joint Pain] : joint pain [Unsteady Walking] : ataxia [Dizziness] : dizziness [Negative] : Heme/Lymph

## 2019-10-04 ENCOUNTER — APPOINTMENT (OUTPATIENT)
Dept: CARDIOLOGY | Facility: CLINIC | Age: 79
End: 2019-10-04
Payer: MEDICARE

## 2019-10-04 ENCOUNTER — TRANSCRIPTION ENCOUNTER (OUTPATIENT)
Age: 79
End: 2019-10-04

## 2019-10-04 VITALS — BODY MASS INDEX: 35.51 KG/M2 | HEART RATE: 67 BPM | HEIGHT: 64 IN | WEIGHT: 208 LBS

## 2019-10-04 LAB
INR PPP: 3.2 RATIO
QUALITY CONTROL: YES

## 2019-10-04 PROCEDURE — 85610 PROTHROMBIN TIME: CPT | Mod: QW

## 2019-10-04 PROCEDURE — 93793 ANTICOAG MGMT PT WARFARIN: CPT

## 2019-10-07 ENCOUNTER — APPOINTMENT (OUTPATIENT)
Dept: INTERNAL MEDICINE | Facility: CLINIC | Age: 79
End: 2019-10-07
Payer: MEDICARE

## 2019-10-07 VITALS
WEIGHT: 208 LBS | HEART RATE: 74 BPM | RESPIRATION RATE: 14 BRPM | OXYGEN SATURATION: 94 % | HEIGHT: 64 IN | BODY MASS INDEX: 35.51 KG/M2 | SYSTOLIC BLOOD PRESSURE: 122 MMHG | DIASTOLIC BLOOD PRESSURE: 70 MMHG

## 2019-10-07 LAB
INR PPP: 2.6 RATIO
POCT-PROTHROMBIN TIME: 34.1 SECS
QUALITY CONTROL: YES

## 2019-10-07 PROCEDURE — 85610 PROTHROMBIN TIME: CPT | Mod: QW

## 2019-10-07 PROCEDURE — 99214 OFFICE O/P EST MOD 30 MIN: CPT | Mod: 25

## 2019-10-07 NOTE — PHYSICAL EXAM
[No Acute Distress] : no acute distress [Well Nourished] : well nourished [Well Developed] : well developed [Normal Voice/Communication] : normal voice/communication [Well-Appearing] : well-appearing [PERRL] : pupils equal round and reactive to light [Normal Sclera/Conjunctiva] : normal sclera/conjunctiva [EOMI] : extraocular movements intact [Normal Outer Ear/Nose] : the outer ears and nose were normal in appearance [Normal Oropharynx] : the oropharynx was normal [No JVD] : no jugular venous distention [No Lymphadenopathy] : no lymphadenopathy [Supple] : supple [Thyroid Normal, No Nodules] : the thyroid was normal and there were no nodules present [No Respiratory Distress] : no respiratory distress  [No Accessory Muscle Use] : no accessory muscle use [Clear to Auscultation] : lungs were clear to auscultation bilaterally [Normal Rate] : normal rate  [Regular Rhythm] : with a regular rhythm [Normal S1, S2] : normal S1 and S2 [No Murmur] : no murmur heard [No Carotid Bruits] : no carotid bruits [No Varicosities] : no varicosities [No Abdominal Bruit] : a ~M bruit was not heard ~T in the abdomen [Pedal Pulses Present] : the pedal pulses are present [No Edema] : there was no peripheral edema [No Palpable Aorta] : no palpable aorta [No Extremity Clubbing/Cyanosis] : no extremity clubbing/cyanosis [Non Tender] : non-tender [Soft] : abdomen soft [Non-distended] : non-distended [No Masses] : no abdominal mass palpated [No HSM] : no HSM [Normal Posterior Cervical Nodes] : no posterior cervical lymphadenopathy [Normal Bowel Sounds] : normal bowel sounds [Normal Anterior Cervical Nodes] : no anterior cervical lymphadenopathy [No CVA Tenderness] : no CVA  tenderness [No Spinal Tenderness] : no spinal tenderness [No Joint Swelling] : no joint swelling [Grossly Normal Strength/Tone] : grossly normal strength/tone [No Rash] : no rash [Coordination Grossly Intact] : coordination grossly intact [No Focal Deficits] : no focal deficits [Normal Gait] : normal gait [Deep Tendon Reflexes (DTR)] : deep tendon reflexes were 2+ and symmetric [Normal Affect] : the affect was normal [Normal Insight/Judgement] : insight and judgment were intact [de-identified] : redness warmth right leg

## 2019-10-11 ENCOUNTER — APPOINTMENT (OUTPATIENT)
Dept: INTERNAL MEDICINE | Facility: CLINIC | Age: 79
End: 2019-10-11
Payer: MEDICARE

## 2019-10-11 VITALS
WEIGHT: 210 LBS | DIASTOLIC BLOOD PRESSURE: 74 MMHG | HEIGHT: 64 IN | OXYGEN SATURATION: 94 % | SYSTOLIC BLOOD PRESSURE: 118 MMHG | HEART RATE: 88 BPM | BODY MASS INDEX: 35.85 KG/M2 | RESPIRATION RATE: 14 BRPM

## 2019-10-11 DIAGNOSIS — L03.115 CELLULITIS OF RIGHT LOWER LIMB: ICD-10-CM

## 2019-10-11 LAB
INR PPP: 2.1 RATIO
POCT-PROTHROMBIN TIME: 25.6 SECS
QUALITY CONTROL: YES

## 2019-10-11 PROCEDURE — 99214 OFFICE O/P EST MOD 30 MIN: CPT

## 2019-10-11 PROCEDURE — 85610 PROTHROMBIN TIME: CPT | Mod: QW

## 2019-10-11 NOTE — PHYSICAL EXAM
[No Acute Distress] : no acute distress [Well Nourished] : well nourished [Well Developed] : well developed [Well-Appearing] : well-appearing [Normal Voice/Communication] : normal voice/communication [Normal Sclera/Conjunctiva] : normal sclera/conjunctiva [PERRL] : pupils equal round and reactive to light [EOMI] : extraocular movements intact [Normal Outer Ear/Nose] : the outer ears and nose were normal in appearance [Normal Oropharynx] : the oropharynx was normal [No JVD] : no jugular venous distention [No Lymphadenopathy] : no lymphadenopathy [Supple] : supple [Thyroid Normal, No Nodules] : the thyroid was normal and there were no nodules present [No Respiratory Distress] : no respiratory distress  [No Accessory Muscle Use] : no accessory muscle use [Clear to Auscultation] : lungs were clear to auscultation bilaterally [Normal Rate] : normal rate  [Regular Rhythm] : with a regular rhythm [No Murmur] : no murmur heard [Normal S1, S2] : normal S1 and S2 [No Carotid Bruits] : no carotid bruits [No Abdominal Bruit] : a ~M bruit was not heard ~T in the abdomen [No Varicosities] : no varicosities [Pedal Pulses Present] : the pedal pulses are present [No Edema] : there was no peripheral edema [No Palpable Aorta] : no palpable aorta [No Extremity Clubbing/Cyanosis] : no extremity clubbing/cyanosis [Soft] : abdomen soft [Non Tender] : non-tender [Non-distended] : non-distended [No Masses] : no abdominal mass palpated [No HSM] : no HSM [Normal Bowel Sounds] : normal bowel sounds [Normal Supraclavicular Nodes] : no supraclavicular lymphadenopathy [Normal Posterior Cervical Nodes] : no posterior cervical lymphadenopathy [Normal Anterior Cervical Nodes] : no anterior cervical lymphadenopathy [No CVA Tenderness] : no CVA  tenderness [No Spinal Tenderness] : no spinal tenderness [No Joint Swelling] : no joint swelling [Grossly Normal Strength/Tone] : grossly normal strength/tone [No Rash] : no rash [Coordination Grossly Intact] : coordination grossly intact [No Focal Deficits] : no focal deficits [Normal Gait] : normal gait [Deep Tendon Reflexes (DTR)] : deep tendon reflexes were 2+ and symmetric [Speech Grossly Normal] : speech grossly normal [Normal Affect] : the affect was normal [Memory Grossly Normal] : memory grossly normal [Normal Mood] : the mood was normal [Normal Insight/Judgement] : insight and judgment were intact [de-identified] : decreased redness

## 2019-10-11 NOTE — HISTORY OF PRESENT ILLNESS
[FreeTextEntry1] : Here for follow up for her cellulitis of leg slowly improving\par needs INR \par no fever

## 2019-10-18 ENCOUNTER — APPOINTMENT (OUTPATIENT)
Dept: CARDIOLOGY | Facility: CLINIC | Age: 79
End: 2019-10-18
Payer: MEDICARE

## 2019-10-18 VITALS — HEIGHT: 64 IN | HEART RATE: 88 BPM | WEIGHT: 210 LBS | BODY MASS INDEX: 35.85 KG/M2

## 2019-10-18 LAB
INR PPP: 2.6 RATIO
QUALITY CONTROL: YES

## 2019-10-18 PROCEDURE — 85610 PROTHROMBIN TIME: CPT | Mod: QW

## 2019-10-18 PROCEDURE — 93793 ANTICOAG MGMT PT WARFARIN: CPT

## 2019-10-21 ENCOUNTER — RX RENEWAL (OUTPATIENT)
Age: 79
End: 2019-10-21

## 2019-10-21 ENCOUNTER — NON-APPOINTMENT (OUTPATIENT)
Age: 79
End: 2019-10-21

## 2019-10-21 ENCOUNTER — APPOINTMENT (OUTPATIENT)
Dept: CARDIOLOGY | Facility: CLINIC | Age: 79
End: 2019-10-21
Payer: MEDICARE

## 2019-10-21 VITALS — SYSTOLIC BLOOD PRESSURE: 130 MMHG | DIASTOLIC BLOOD PRESSURE: 60 MMHG

## 2019-10-21 VITALS
OXYGEN SATURATION: 94 % | BODY MASS INDEX: 35.51 KG/M2 | HEIGHT: 64 IN | DIASTOLIC BLOOD PRESSURE: 78 MMHG | SYSTOLIC BLOOD PRESSURE: 143 MMHG | HEART RATE: 74 BPM | WEIGHT: 208 LBS

## 2019-10-21 PROCEDURE — 99214 OFFICE O/P EST MOD 30 MIN: CPT

## 2019-10-21 PROCEDURE — 93000 ELECTROCARDIOGRAM COMPLETE: CPT

## 2019-10-21 NOTE — CARDIOLOGY SUMMARY
[___] : [unfilled] [No Ischemia] : no Ischemia [None] : normal LV function [Mild] : mild mitral regurgitation [Normal] : normal LA size

## 2019-10-23 NOTE — PHYSICAL EXAM
[General Appearance - Well Nourished] : well nourished [General Appearance - Well Developed] : well developed [Normal Conjunctiva] : the conjunctiva exhibited no abnormalities [Eyelids - No Xanthelasma] : the eyelids demonstrated no xanthelasmas [Normal Oral Mucosa] : normal oral mucosa [No Oral Pallor] : no oral pallor [No Oral Cyanosis] : no oral cyanosis [Normal Jugular Venous A Waves Present] : normal jugular venous A waves present [No Jugular Venous Keen A Waves] : no jugular venous keen A waves [Normal Jugular Venous V Waves Present] : normal jugular venous V waves present [Respiration, Rhythm And Depth] : normal respiratory rhythm and effort [Auscultation Breath Sounds / Voice Sounds] : lungs were clear to auscultation bilaterally [Exaggerated Use Of Accessory Muscles For Inspiration] : no accessory muscle use [Abdomen Soft] : soft [Abdomen Tenderness] : non-tender [Abdomen Mass (___ Cm)] : no abdominal mass palpated [Nail Clubbing] : no clubbing of the fingernails [Abnormal Walk] : normal gait [Petechial Hemorrhages (___cm)] : no petechial hemorrhages [Cyanosis, Localized] : no localized cyanosis [] : no rash [Skin Color & Pigmentation] : normal skin color and pigmentation [No Venous Stasis] : no venous stasis [Skin Lesions] : no skin lesions [Oriented To Time, Place, And Person] : oriented to person, place, and time [Affect] : the affect was normal [No Anxiety] : not feeling anxious [Mood] : the mood was normal [Rhythm Regular] : regular [Normal Rate] : normal [Normal S1] : normal S1 [Normal S2] : normal S2 [I] : a grade 1 [1+] : left 1+ [No Pitting Edema] : no pitting edema present [Rt] : varicose veins of the right leg noted [Lt] : varicose veins of the left leg noted [Right Carotid Bruit] : no bruit heard over the right carotid [Left Carotid Bruit] : no bruit heard over the left carotid

## 2019-10-23 NOTE — REVIEW OF SYSTEMS
[see HPI] : see HPI [Palpitations] : palpitations [Change In The Stool] : change in stool [Joint Pain] : joint pain [Joint Stiffness] : joint stiffness [Dizziness] : dizziness [Negative] : Heme/Lymph [Feeling Fatigued] : not feeling fatigued [Shortness Of Breath] : no shortness of breath [Dyspnea on exertion] : not dyspnea during exertion [Chest  Pressure] : no chest pressure [Chest Pain] : no chest pain [Leg Claudication] : no intermittent leg claudication [Lower Ext Edema] : no extremity edema [Under Stress] : not under stress

## 2019-10-23 NOTE — HISTORY OF PRESENT ILLNESS
[FreeTextEntry1] : 78-year-old woman with a history of GI bleeding , atrial fibrillation  status post pulmonary vein isolation at [place] not on anticoagulation, COPD, diabetes, IPF, granuloma annulari (resolves with steroids) palpations. An event monitor demonstrated that her symptoms correlate to PAF/PAT episodes and APCs.\par we started her on Coumadin but even before she was therapeutic she started having bouts of rectal bleeding. Her Coumadin was then discontinued.\par After speaking to her colorectal surgeon we resumed her AC. \par \par in July of 2016 she had a fall  with hip fracture. Her course was cough with treated by an SBO requiring an exlap. She had a  left leg DVT and PE. She had a IVC filter placed. She had a  spontaneous hematoma develop and was taken off of AC. She also had multiple transfusions. Her Sotalol was discontinued and she was put on Amiodarone in order to help maintain SR rhythm. \par \par She was discharged and then developed a right leg DVT in rehab on 10/2016. She had been restarted on anticoagulation. She reports already requiring 3 PRBC transfusions in rehab. \par \par She had right hand carpal tunnel surgery on 6/27/17 at  with resulting hematoma while on Lovenox bridge. \par \par Her ABPM showed an average BP of 132/72.\par \par She is now here for a followup visit.\par Since her last visit, she had a cellulitis which is now resolving on Doxycycline. \par \par Her lightheadedness has improved with her treatment of cellulitis.  She was also diagnosed with hyperparathyroidism by Dr. JOEL Jasmine. \par \par She has been doing the aqua therapy and really helps her. She is still having significant left hip pain. It was helped with prednisone but now is off. \par   \par Her lower extremity edema has essentially resolved. She is drinking at least about 3 16oz bottles. \par  \par She had an episode of palpitation for about 10 minutes on 9/3 which was long for her. She had 3 other episode which were similar in intensity but not as long. \par \par she denies any dyspnea. \par \par She  denies any chest pain, PND, orthopnea,  strokelike symptoms. She is compliant with her other medications. No reported melena, hematochezia or hematemesis.  \par Her Hb has been stable between 10-11 but recently was noted to be in the 9s. She is still getting Procrit.

## 2019-10-23 NOTE — DISCUSSION/SUMMARY
[FreeTextEntry1] : 78 year old woman with a history as listed above presents for a followup visit. \par \par Carolina is  feeling relatively well. Her event monitor was significant for symptomatic PACS and a run of PSVT (PAT) when she complained for dizziness. She did not have any signs of heart block or symptomatic bradycardia.   She will continue  Amiodarone 100mg Qday given how symptomatic she is from her PAF. Her HR has been stable. I will try to resume Lopressor 12.5mg q12. \par \par Her blood pressure is no longer low. She is maintaining good pressures and is staying hydrated.  \par \par She denies any anginal symptoms. Clinically she is not in decompensated heart failure. Her lower extremity edema has  resolved.  Her physical exam was essentially unrevealing for any significant cardiovascular findings. Her EKG is unchanged from previous. \par \par her dyspnea is likely a function of her deconditioning. She will try to exercise more but likely will be limited by her hip issues. She will continue with aquatherapy which has helped. \par \par In 5/2019 her lipid profile was at goal. \par \par Her Hb has been stable since the last PRBC transfusion.  If her hemoglobin is still under 8 she should get a blood transfusion. She is now on Procrit. She will continue to followup with heme. \par \par She will have her INR checked in our [facility]. Her goal INR is 2-3 for CVA risk reduction and VTE prevention. \par \par She will followup with pulmonary (Dr. Gallegos). her last PFTs showed a moderate reduction in diffusion in 11/2017.  She states that she had another PFT in august 2019.\par \par Her TSH was normal in 5/2019 She needs this checked annually as well. She went to opthalmology recently and had a normal check. \par \par \par She will followup with me in 2 month. She will followup with you for all of her other medical needs. \par

## 2019-11-01 ENCOUNTER — APPOINTMENT (OUTPATIENT)
Dept: CARDIOLOGY | Facility: CLINIC | Age: 79
End: 2019-11-01
Payer: MEDICARE

## 2019-11-01 ENCOUNTER — APPOINTMENT (OUTPATIENT)
Dept: INTERNAL MEDICINE | Facility: CLINIC | Age: 79
End: 2019-11-01
Payer: MEDICARE

## 2019-11-01 VITALS — HEART RATE: 74 BPM | WEIGHT: 208 LBS | BODY MASS INDEX: 35.51 KG/M2 | HEIGHT: 64 IN

## 2019-11-01 VITALS
WEIGHT: 208 LBS | TEMPERATURE: 99 F | HEART RATE: 58 BPM | SYSTOLIC BLOOD PRESSURE: 110 MMHG | DIASTOLIC BLOOD PRESSURE: 64 MMHG | BODY MASS INDEX: 35.51 KG/M2 | RESPIRATION RATE: 16 BRPM | OXYGEN SATURATION: 92 % | HEIGHT: 64 IN

## 2019-11-01 PROCEDURE — 93793 ANTICOAG MGMT PT WARFARIN: CPT

## 2019-11-01 PROCEDURE — 85610 PROTHROMBIN TIME: CPT | Mod: QW

## 2019-11-01 PROCEDURE — 99214 OFFICE O/P EST MOD 30 MIN: CPT

## 2019-11-01 NOTE — HISTORY OF PRESENT ILLNESS
[Moderate] : moderate [___ Days ago] : [unfilled] days ago [Sore Throat] : sore throat [Fatigue] : fatigue [Headache] : headache [Worsening] : worsening [Congestion] : no congestion [Cough] : no cough [Wheezing] : no wheezing [Chills] : no chills [Anorexia] : no anorexia [Shortness Of Breath] : no shortness of breath [Fever] : no fever

## 2019-11-01 NOTE — PHYSICAL EXAM
[No Acute Distress] : no acute distress [Well Nourished] : well nourished [Well Developed] : well developed [Well-Appearing] : well-appearing [Normal Voice/Communication] : normal voice/communication [Normal Sclera/Conjunctiva] : normal sclera/conjunctiva [PERRL] : pupils equal round and reactive to light [EOMI] : extraocular movements intact [Normal Outer Ear/Nose] : the outer ears and nose were normal in appearance [Normal Oropharynx] : the oropharynx was normal [No JVD] : no jugular venous distention [No Lymphadenopathy] : no lymphadenopathy [Supple] : supple [Thyroid Normal, No Nodules] : the thyroid was normal and there were no nodules present [No Respiratory Distress] : no respiratory distress  [No Accessory Muscle Use] : no accessory muscle use [Clear to Auscultation] : lungs were clear to auscultation bilaterally [Normal Rate] : normal rate  [Regular Rhythm] : with a regular rhythm [Normal S1, S2] : normal S1 and S2 [No Murmur] : no murmur heard [No Carotid Bruits] : no carotid bruits [No Abdominal Bruit] : a ~M bruit was not heard ~T in the abdomen [No Varicosities] : no varicosities [Pedal Pulses Present] : the pedal pulses are present [No Edema] : there was no peripheral edema [No Palpable Aorta] : no palpable aorta [No Extremity Clubbing/Cyanosis] : no extremity clubbing/cyanosis [Soft] : abdomen soft [Non Tender] : non-tender [Non-distended] : non-distended [No Masses] : no abdominal mass palpated [No HSM] : no HSM [Normal Bowel Sounds] : normal bowel sounds [Normal Posterior Cervical Nodes] : no posterior cervical lymphadenopathy [Normal Anterior Cervical Nodes] : no anterior cervical lymphadenopathy [No CVA Tenderness] : no CVA  tenderness [No Spinal Tenderness] : no spinal tenderness [No Joint Swelling] : no joint swelling [Grossly Normal Strength/Tone] : grossly normal strength/tone [No Rash] : no rash [Coordination Grossly Intact] : coordination grossly intact [No Focal Deficits] : no focal deficits [Normal Gait] : normal gait [Deep Tendon Reflexes (DTR)] : deep tendon reflexes were 2+ and symmetric [Speech Grossly Normal] : speech grossly normal [Memory Grossly Normal] : memory grossly normal [Normal Affect] : the affect was normal [Normal Mood] : the mood was normal [Normal Insight/Judgement] : insight and judgment were intact

## 2019-11-03 LAB
INR PPP: 2.6 RATIO
QUALITY CONTROL: YES

## 2019-11-15 ENCOUNTER — APPOINTMENT (OUTPATIENT)
Dept: INTERNAL MEDICINE | Facility: CLINIC | Age: 79
End: 2019-11-15

## 2019-11-18 ENCOUNTER — NON-APPOINTMENT (OUTPATIENT)
Age: 79
End: 2019-11-18

## 2019-11-18 ENCOUNTER — APPOINTMENT (OUTPATIENT)
Dept: CARDIOLOGY | Facility: CLINIC | Age: 79
End: 2019-11-18
Payer: MEDICARE

## 2019-11-18 VITALS
WEIGHT: 211 LBS | SYSTOLIC BLOOD PRESSURE: 144 MMHG | DIASTOLIC BLOOD PRESSURE: 80 MMHG | HEIGHT: 70 IN | HEART RATE: 54 BPM | BODY MASS INDEX: 30.21 KG/M2 | OXYGEN SATURATION: 95 %

## 2019-11-18 PROCEDURE — 93000 ELECTROCARDIOGRAM COMPLETE: CPT

## 2019-11-18 PROCEDURE — 99214 OFFICE O/P EST MOD 30 MIN: CPT

## 2019-11-18 RX ORDER — METOPROLOL SUCCINATE 25 MG/1
25 TABLET, EXTENDED RELEASE ORAL DAILY
Qty: 30 | Refills: 5 | Status: DISCONTINUED | COMMUNITY
Start: 2019-10-21 | End: 2019-11-18

## 2019-11-18 NOTE — REVIEW OF SYSTEMS
[see HPI] : see HPI [Feeling Fatigued] : not feeling fatigued [Dyspnea on exertion] : not dyspnea during exertion [Shortness Of Breath] : no shortness of breath [Chest  Pressure] : no chest pressure [Leg Claudication] : no intermittent leg claudication [Chest Pain] : no chest pain [Lower Ext Edema] : no extremity edema [Palpitations] : palpitations [Change In The Stool] : change in stool [Joint Pain] : joint pain [Joint Stiffness] : joint stiffness [Dizziness] : dizziness [Under Stress] : not under stress [Negative] : Heme/Lymph

## 2019-11-18 NOTE — HISTORY OF PRESENT ILLNESS
[FreeTextEntry1] : 79 year-old woman with a history of GI bleeding , atrial fibrillation  status post pulmonary vein isolation at [place] not on anticoagulation, COPD, diabetes, IPF, granuloma annulari (resolves with steroids) palpations. An event monitor demonstrated that her symptoms correlate to PAF/PAT episodes and APCs.\par we started her on Coumadin but even before she was therapeutic she started having bouts of rectal bleeding. Her Coumadin was then discontinued.\par After speaking to her colorectal surgeon we resumed her AC. \par \par in July of 2016 she had a fall  with hip fracture. Her course was cough with treated by an SBO requiring an exlap. She had a  left leg DVT and PE. She had a IVC filter placed. She had a  spontaneous hematoma develop and was taken off of AC. She also had multiple transfusions. Her Sotalol was discontinued and she was put on Amiodarone in order to help maintain SR rhythm. \par \par She was discharged and then developed a right leg DVT in rehab on 10/2016. She had been restarted on anticoagulation. She reports already requiring 3 PRBC transfusions in rehab. \par \par She had right hand carpal tunnel surgery on 6/27/17 at  with resulting hematoma while on Lovenox bridge. \par \par Her ABPM showed an average BP of 132/72.\par  She was also diagnosed with hyperparathyroidism by Dr. JOEL Jasmine. \par \par She is now here for a followup visit.\par Since her last visit,  she has been been complaining of pervasive fatigue. She has been complaining of dyspnea on minimal exertion. She feels that her symptoms have started since being on Toprol. Though she does feel that her palpitation have improved on the Toprol. \par  \par She has still been doing the aqua therapy. She is still having significant left hip pain. It was helped with prednisone but now is off. \par   \par Her lower extremity edema has essentially resolved. She is drinking at least about 3 16oz bottles. \par \par She  denies any chest pain, PND, orthopnea, near syncope, syncope  strokelike symptoms. She is compliant with her other medications. No reported melena, hematochezia or hematemesis.  \par Her Hb has been stable between 10-11. She is still getting Procrit.

## 2019-11-18 NOTE — PHYSICAL EXAM
[General Appearance - Well Developed] : well developed [Normal Conjunctiva] : the conjunctiva exhibited no abnormalities [Eyelids - No Xanthelasma] : the eyelids demonstrated no xanthelasmas [General Appearance - Well Nourished] : well nourished [Normal Oral Mucosa] : normal oral mucosa [No Oral Pallor] : no oral pallor [Normal Jugular Venous A Waves Present] : normal jugular venous A waves present [No Oral Cyanosis] : no oral cyanosis [No Jugular Venous Keen A Waves] : no jugular venous keen A waves [Normal Jugular Venous V Waves Present] : normal jugular venous V waves present [Respiration, Rhythm And Depth] : normal respiratory rhythm and effort [Exaggerated Use Of Accessory Muscles For Inspiration] : no accessory muscle use [Auscultation Breath Sounds / Voice Sounds] : lungs were clear to auscultation bilaterally [Abdomen Soft] : soft [Abdomen Tenderness] : non-tender [Abdomen Mass (___ Cm)] : no abdominal mass palpated [Abnormal Walk] : normal gait [Nail Clubbing] : no clubbing of the fingernails [Cyanosis, Localized] : no localized cyanosis [Petechial Hemorrhages (___cm)] : no petechial hemorrhages [Skin Color & Pigmentation] : normal skin color and pigmentation [] : no rash [No Venous Stasis] : no venous stasis [Skin Lesions] : no skin lesions [Oriented To Time, Place, And Person] : oriented to person, place, and time [Affect] : the affect was normal [Mood] : the mood was normal [No Anxiety] : not feeling anxious [Normal Rate] : normal [Rhythm Regular] : regular [Normal S1] : normal S1 [I] : a grade 1 [Normal S2] : normal S2 [1+] : left 1+ [Left Carotid Bruit] : no bruit heard over the left carotid [Right Carotid Bruit] : no bruit heard over the right carotid [No Pitting Edema] : no pitting edema present [Rt] : varicose veins of the right leg noted [Lt] : varicose veins of the left leg noted

## 2019-11-18 NOTE — DISCUSSION/SUMMARY
[FreeTextEntry1] : 79 year old woman with a history as listed above presents for a followup visit. \par \par Carolina is feeling very tired and dyspneic. She is euvolemic on exam. This could be a side effect of the lopressor. I will stop it and see if her symptoms resolve. Given that her event monitor was significant for symptomatic PACS and a run of PSVT (PAT) when she complained for dizziness I think that she may benefit from trying Cardizem 30mg q12.  She did not have any signs of heart block or symptomatic bradycardia during the monitoring period.   She will continue  Amiodarone 100mg Qday given how symptomatic she is from her PAF. \par \par Her blood pressure is no longer low. She is maintaining good pressures and is staying hydrated.  \par \par She denies any anginal symptoms. Clinically she is not in decompensated heart failure. Her lower extremity edema has  resolved.  Her physical exam was essentially unrevealing for any significant cardiovascular findings. Her EKG is unchanged from previous. \par \par her dyspnea is likely a function of her deconditioning. She will try to exercise more but likely will be limited by her hip issues. She will continue with aquatherapy which has helped. \par \par In 5/2019 her lipid profile was at goal. \par \par Her Hb has been stable since the last PRBC transfusion.  If her hemoglobin is still under 8 she should get a blood transfusion. She is now on Procrit. She will continue to followup with heme. \par \par She will have her INR checked in our [facility]. Her goal INR is 2-3 for CVA risk reduction and VTE prevention. \par \par She will followup with pulmonary (Dr. Gallegos). her last PFTs showed a moderate reduction in diffusion in 11/2017.  She states that she had another PFT in august 2019.\par \par Her TSH was normal in 5/2019 She needs this checked annually as well. She went to opthalmology recently and had a normal check. \par \par \par She will followup with me in 1 month. She will followup with you for all of her other medical needs. \par

## 2019-11-19 ENCOUNTER — APPOINTMENT (OUTPATIENT)
Dept: INTERNAL MEDICINE | Facility: CLINIC | Age: 79
End: 2019-11-19
Payer: MEDICARE

## 2019-11-19 VITALS
TEMPERATURE: 98.9 F | HEART RATE: 59 BPM | WEIGHT: 211 LBS | DIASTOLIC BLOOD PRESSURE: 60 MMHG | OXYGEN SATURATION: 93 % | SYSTOLIC BLOOD PRESSURE: 120 MMHG | HEIGHT: 70 IN | BODY MASS INDEX: 30.21 KG/M2 | RESPIRATION RATE: 16 BRPM

## 2019-11-19 PROCEDURE — 99214 OFFICE O/P EST MOD 30 MIN: CPT

## 2019-11-19 NOTE — REVIEW OF SYSTEMS
[Postnasal Drip] : postnasal drip [Sore Throat] : sore throat [Shortness Of Breath] : shortness of breath [Negative] : Heme/Lymph

## 2019-11-19 NOTE — PHYSICAL EXAM
[No Acute Distress] : no acute distress [Well Nourished] : well nourished [Well-Appearing] : well-appearing [Well Developed] : well developed [Normal Sclera/Conjunctiva] : normal sclera/conjunctiva [PERRL] : pupils equal round and reactive to light [EOMI] : extraocular movements intact [Normal Outer Ear/Nose] : the outer ears and nose were normal in appearance [Normal Oropharynx] : the oropharynx was normal [No JVD] : no jugular venous distention [No Lymphadenopathy] : no lymphadenopathy [Supple] : supple [Thyroid Normal, No Nodules] : the thyroid was normal and there were no nodules present [No Respiratory Distress] : no respiratory distress  [No Accessory Muscle Use] : no accessory muscle use [Clear to Auscultation] : lungs were clear to auscultation bilaterally [Normal Rate] : normal rate  [Regular Rhythm] : with a regular rhythm [Normal S1, S2] : normal S1 and S2 [No Murmur] : no murmur heard [No Carotid Bruits] : no carotid bruits [No Abdominal Bruit] : a ~M bruit was not heard ~T in the abdomen [Pedal Pulses Present] : the pedal pulses are present [No Varicosities] : no varicosities [No Edema] : there was no peripheral edema [No Palpable Aorta] : no palpable aorta [No Extremity Clubbing/Cyanosis] : no extremity clubbing/cyanosis [Non Tender] : non-tender [Soft] : abdomen soft [Non-distended] : non-distended [No Masses] : no abdominal mass palpated [No HSM] : no HSM [Normal Bowel Sounds] : normal bowel sounds [Normal Posterior Cervical Nodes] : no posterior cervical lymphadenopathy [Normal Anterior Cervical Nodes] : no anterior cervical lymphadenopathy [No CVA Tenderness] : no CVA  tenderness [No Spinal Tenderness] : no spinal tenderness [No Joint Swelling] : no joint swelling [Grossly Normal Strength/Tone] : grossly normal strength/tone [No Rash] : no rash [Normal Gait] : normal gait [Coordination Grossly Intact] : coordination grossly intact [No Focal Deficits] : no focal deficits [Normal Affect] : the affect was normal [Deep Tendon Reflexes (DTR)] : deep tendon reflexes were 2+ and symmetric [Normal Insight/Judgement] : insight and judgment were intact

## 2019-11-19 NOTE — HISTORY OF PRESENT ILLNESS
[Cold Symptoms] : cold symptoms [___ Days ago] : [unfilled] days ago [Moderate] : moderate [Episodic] : episodic  [Worsening] : worsening [Congestion] : no congestion [Cough] : no cough [Sore Throat] : no sore throat [Wheezing] : no wheezing [Chills] : no chills [Anorexia] : no anorexia [Earache] : no earache [Shortness Of Breath] : no shortness of breath [Headache] : no headache [Fever] : no fever [Fatigue] : not fatigue

## 2019-11-22 ENCOUNTER — RX RENEWAL (OUTPATIENT)
Age: 79
End: 2019-11-22

## 2019-11-25 ENCOUNTER — MEDICATION RENEWAL (OUTPATIENT)
Age: 79
End: 2019-11-25

## 2019-11-27 ENCOUNTER — MEDICATION RENEWAL (OUTPATIENT)
Age: 79
End: 2019-11-27

## 2019-12-02 ENCOUNTER — APPOINTMENT (OUTPATIENT)
Dept: INTERNAL MEDICINE | Facility: CLINIC | Age: 79
End: 2019-12-02
Payer: MEDICARE

## 2019-12-02 VITALS
HEIGHT: 69 IN | OXYGEN SATURATION: 96 % | SYSTOLIC BLOOD PRESSURE: 158 MMHG | BODY MASS INDEX: 31.1 KG/M2 | RESPIRATION RATE: 16 BRPM | WEIGHT: 210 LBS | DIASTOLIC BLOOD PRESSURE: 72 MMHG | TEMPERATURE: 97.8 F | HEART RATE: 66 BPM

## 2019-12-02 VITALS — SYSTOLIC BLOOD PRESSURE: 140 MMHG | DIASTOLIC BLOOD PRESSURE: 72 MMHG

## 2019-12-02 LAB
INR PPP: 3 RATIO
POCT-PROTHROMBIN TIME: 35.7 SECS
QUALITY CONTROL: YES

## 2019-12-02 PROCEDURE — 99214 OFFICE O/P EST MOD 30 MIN: CPT | Mod: 25

## 2019-12-02 PROCEDURE — 85610 PROTHROMBIN TIME: CPT | Mod: QW

## 2019-12-02 NOTE — PHYSICAL EXAM
[No Acute Distress] : no acute distress [Well Developed] : well developed [Well Nourished] : well nourished [Normal Sclera/Conjunctiva] : normal sclera/conjunctiva [PERRL] : pupils equal round and reactive to light [Well-Appearing] : well-appearing [EOMI] : extraocular movements intact [Normal Outer Ear/Nose] : the outer ears and nose were normal in appearance [Normal Nasal Mucosa] : the nasal mucosa was normal [Normal Oropharynx] : the oropharynx was normal [No JVD] : no jugular venous distention [No Lymphadenopathy] : no lymphadenopathy [Thyroid Normal, No Nodules] : the thyroid was normal and there were no nodules present [Supple] : supple [No Respiratory Distress] : no respiratory distress  [No Accessory Muscle Use] : no accessory muscle use [Clear to Auscultation] : lungs were clear to auscultation bilaterally [Normal Rate] : normal rate  [Normal S1, S2] : normal S1 and S2 [Regular Rhythm] : with a regular rhythm [No Carotid Bruits] : no carotid bruits [Pedal Pulses Present] : the pedal pulses are present [No Varicosities] : no varicosities [No Abdominal Bruit] : a ~M bruit was not heard ~T in the abdomen [No Extremity Clubbing/Cyanosis] : no extremity clubbing/cyanosis [No Palpable Aorta] : no palpable aorta [No Edema] : there was no peripheral edema [Soft] : abdomen soft [Non Tender] : non-tender [Non-distended] : non-distended [No Masses] : no abdominal mass palpated [Normal Bowel Sounds] : normal bowel sounds [No HSM] : no HSM [Normal Axillary Nodes] : no axillary lymphadenopathy [Normal Supraclavicular Nodes] : no supraclavicular lymphadenopathy [Normal Posterior Cervical Nodes] : no posterior cervical lymphadenopathy [Normal Anterior Cervical Nodes] : no anterior cervical lymphadenopathy [No Spinal Tenderness] : no spinal tenderness [No Joint Swelling] : no joint swelling [No CVA Tenderness] : no CVA  tenderness [Grossly Normal Strength/Tone] : grossly normal strength/tone [No Rash] : no rash [Coordination Grossly Intact] : coordination grossly intact [Normal Gait] : normal gait [No Focal Deficits] : no focal deficits [Speech Grossly Normal] : speech grossly normal [Deep Tendon Reflexes (DTR)] : deep tendon reflexes were 2+ and symmetric [Memory Grossly Normal] : memory grossly normal [Normal Affect] : the affect was normal [Normal Mood] : the mood was normal [Alert and Oriented x3] : oriented to person, place, and time [Normal Insight/Judgement] : insight and judgment were intact [de-identified] : nasal mucosa inflamed

## 2019-12-02 NOTE — HISTORY OF PRESENT ILLNESS
[FreeTextEntry1] : Has sinus pain has had 2 courses of antibiotics \par has nose bleed\par needs to check INR

## 2019-12-06 ENCOUNTER — APPOINTMENT (OUTPATIENT)
Dept: CARDIOLOGY | Facility: CLINIC | Age: 79
End: 2019-12-06
Payer: MEDICARE

## 2019-12-06 VITALS — WEIGHT: 210 LBS | HEIGHT: 69 IN | BODY MASS INDEX: 31.1 KG/M2 | HEART RATE: 66 BPM

## 2019-12-06 LAB — INR PPP: 2.4 RATIO

## 2019-12-06 PROCEDURE — 85610 PROTHROMBIN TIME: CPT | Mod: QW

## 2019-12-06 PROCEDURE — 93793 ANTICOAG MGMT PT WARFARIN: CPT

## 2019-12-20 ENCOUNTER — APPOINTMENT (OUTPATIENT)
Dept: CARDIOLOGY | Facility: CLINIC | Age: 79
End: 2019-12-20
Payer: MEDICARE

## 2019-12-20 VITALS
BODY MASS INDEX: 30.96 KG/M2 | DIASTOLIC BLOOD PRESSURE: 78 MMHG | HEART RATE: 71 BPM | HEIGHT: 69 IN | WEIGHT: 209 LBS | OXYGEN SATURATION: 94 % | SYSTOLIC BLOOD PRESSURE: 141 MMHG

## 2019-12-20 PROCEDURE — 99214 OFFICE O/P EST MOD 30 MIN: CPT

## 2019-12-20 PROCEDURE — 93000 ELECTROCARDIOGRAM COMPLETE: CPT

## 2019-12-20 NOTE — REVIEW OF SYSTEMS
[Feeling Fatigued] : not feeling fatigued [see HPI] : see HPI [Shortness Of Breath] : no shortness of breath [Chest  Pressure] : no chest pressure [Dyspnea on exertion] : not dyspnea during exertion [Chest Pain] : no chest pain [Leg Claudication] : no intermittent leg claudication [Lower Ext Edema] : no extremity edema [Palpitations] : palpitations [Joint Pain] : joint pain [Change In The Stool] : change in stool [Joint Stiffness] : joint stiffness [Dizziness] : dizziness [Under Stress] : not under stress [Negative] : Heme/Lymph

## 2019-12-20 NOTE — PHYSICAL EXAM
[General Appearance - Well Developed] : well developed [General Appearance - Well Nourished] : well nourished [Normal Conjunctiva] : the conjunctiva exhibited no abnormalities [Eyelids - No Xanthelasma] : the eyelids demonstrated no xanthelasmas [Normal Oral Mucosa] : normal oral mucosa [No Oral Pallor] : no oral pallor [No Oral Cyanosis] : no oral cyanosis [Normal Jugular Venous A Waves Present] : normal jugular venous A waves present [Normal Jugular Venous V Waves Present] : normal jugular venous V waves present [No Jugular Venous Keen A Waves] : no jugular venous keen A waves [Exaggerated Use Of Accessory Muscles For Inspiration] : no accessory muscle use [Respiration, Rhythm And Depth] : normal respiratory rhythm and effort [Abdomen Soft] : soft [Auscultation Breath Sounds / Voice Sounds] : lungs were clear to auscultation bilaterally [Abdomen Mass (___ Cm)] : no abdominal mass palpated [Abdomen Tenderness] : non-tender [Nail Clubbing] : no clubbing of the fingernails [Abnormal Walk] : normal gait [Cyanosis, Localized] : no localized cyanosis [Petechial Hemorrhages (___cm)] : no petechial hemorrhages [Skin Color & Pigmentation] : normal skin color and pigmentation [No Venous Stasis] : no venous stasis [] : no rash [Skin Lesions] : no skin lesions [Affect] : the affect was normal [Oriented To Time, Place, And Person] : oriented to person, place, and time [No Anxiety] : not feeling anxious [Mood] : the mood was normal [Rhythm Regular] : regular [Normal Rate] : normal [Normal S2] : normal S2 [Normal S1] : normal S1 [I] : a grade 1 [Right Carotid Bruit] : no bruit heard over the right carotid [1+] : left 1+ [Left Carotid Bruit] : no bruit heard over the left carotid [No Pitting Edema] : no pitting edema present [Rt] : varicose veins of the right leg noted [Lt] : varicose veins of the left leg noted

## 2019-12-20 NOTE — HISTORY OF PRESENT ILLNESS
[FreeTextEntry1] : 79 year-old woman with a history of GI bleeding , atrial fibrillation  status post pulmonary vein isolation at [place] not on anticoagulation, COPD, diabetes, IPF, granuloma annulari (resolves with steroids) palpations. An event monitor demonstrated that her symptoms correlate to PAF/PAT episodes and APCs.\par we started her on Coumadin but even before she was therapeutic she started having bouts of rectal bleeding. Her Coumadin was then discontinued.\par After speaking to her colorectal surgeon we resumed her AC. \par \par in July of 2016 she had a fall  with hip fracture. Her course was cough with treated by an SBO requiring an exlap. She had a  left leg DVT and PE. She had a IVC filter placed. She had a  spontaneous hematoma develop and was taken off of AC. She also had multiple transfusions. Her Sotalol was discontinued and she was put on Amiodarone in order to help maintain SR rhythm. \par \par She was discharged and then developed a right leg DVT in rehab on 10/2016. She had been restarted on anticoagulation. She reports already requiring 3 PRBC transfusions in rehab. \par \par She had right hand carpal tunnel surgery on 6/27/17 at  with resulting hematoma while on Lovenox bridge. \par \par Her ABPM showed an average BP of 132/72.\par  She was also diagnosed with hyperparathyroidism by Dr. JOEL Jasmine. \par \par She is now here for a followup visit.\par Since her last visit,  she has been complaining of more neck pain and awaiting a MR report. She recently has been fighting a protracted sinus infection. She has mild epistaxis. \par Her dyspnea has not worsened. Her fatigue has improved. \par \par She has still been doing the aqua therapy. She is still having significant left hip pain. It was helped with prednisone but now is off. \par   \par Her lower extremity edema has essentially resolved. She is drinking at least about 3 16oz bottles. \par \par She  denies any chest pain, PND, orthopnea, near syncope, syncope  strokelike symptoms. She is compliant with her other medications. No reported melena, hematochezia or hematemesis.  \par Her Hb has been stable between 10-11. She is still getting Procrit.

## 2019-12-20 NOTE — DISCUSSION/SUMMARY
[FreeTextEntry1] : 79 year old woman with a history as listed above presents for a followup visit. \par \par Carolina is feeling better off of the metoprolol.  Given that her event monitor was significant for symptomatic PACS and a run of PSVT (PAT).  She did not have any signs of heart block or symptomatic bradycardia during the monitoring period.   She is tolerating the cardizem. I would increase the cardizem 30mg q8.She will continue  Amiodarone 100mg Qday given how symptomatic she is from her PAF. \par \par She denies any anginal symptoms. Clinically she is not in decompensated heart failure. Her lower extremity edema has  resolved.  Her physical exam was essentially unrevealing for any significant cardiovascular findings. Her EKG is unchanged from previous. \par \par her dyspnea is likely a function of her deconditioning. She will try to exercise more but likely will be limited by her hip issues. She will continue with aquatherapy which has helped. \par \par In 5/2019 her lipid profile was at goal. \par \par Her Hb has been stable since the last PRBC transfusion.  If her hemoglobin is still under 8 she should get a blood transfusion. She is now on Procrit. She will continue to followup with heme. \par \par She will have her INR checked in our [facility]mad[facility]. Her goal INR is 2-3 for CVA risk reduction and VTE prevention. \par \par She will followup with pulmonary (Dr. Gallegos). her last PFTs showed a moderate reduction in diffusion in 11/2017.  She states that she had another PFT in august 2019.\par \par Her TSH was normal in 5/2019 She needs this checked annually as well. She went to opthalmology recently and had a normal check. \par \par \par She will followup with me in 2 month. She will followup with you for all of her other medical needs. \par

## 2019-12-28 ENCOUNTER — TRANSCRIPTION ENCOUNTER (OUTPATIENT)
Age: 79
End: 2019-12-28

## 2020-01-08 ENCOUNTER — APPOINTMENT (OUTPATIENT)
Dept: CARDIOLOGY | Facility: CLINIC | Age: 80
End: 2020-01-08
Payer: MEDICARE

## 2020-01-08 VITALS — BODY MASS INDEX: 30.96 KG/M2 | HEIGHT: 69 IN | WEIGHT: 209 LBS | HEART RATE: 71 BPM

## 2020-01-08 LAB
INR PPP: 2.8 RATIO
QUALITY CONTROL: YES

## 2020-01-08 PROCEDURE — 85610 PROTHROMBIN TIME: CPT | Mod: QW

## 2020-01-08 PROCEDURE — 93793 ANTICOAG MGMT PT WARFARIN: CPT

## 2020-01-13 ENCOUNTER — APPOINTMENT (OUTPATIENT)
Dept: INTERNAL MEDICINE | Facility: CLINIC | Age: 80
End: 2020-01-13
Payer: MEDICARE

## 2020-01-13 VITALS
HEIGHT: 69 IN | WEIGHT: 210 LBS | DIASTOLIC BLOOD PRESSURE: 74 MMHG | OXYGEN SATURATION: 93 % | BODY MASS INDEX: 31.1 KG/M2 | SYSTOLIC BLOOD PRESSURE: 132 MMHG | RESPIRATION RATE: 14 BRPM | HEART RATE: 83 BPM | TEMPERATURE: 98.3 F

## 2020-01-13 DIAGNOSIS — R10.9 UNSPECIFIED ABDOMINAL PAIN: ICD-10-CM

## 2020-01-13 PROCEDURE — 36415 COLL VENOUS BLD VENIPUNCTURE: CPT

## 2020-01-13 PROCEDURE — 99214 OFFICE O/P EST MOD 30 MIN: CPT | Mod: 25

## 2020-01-13 NOTE — REVIEW OF SYSTEMS
[Abdominal Pain] : abdominal pain [Diarrhea] : diarrhea [Nausea] : nausea [Heartburn] : heartburn [Negative] : Psychiatric [Vomiting] : no vomiting

## 2020-01-13 NOTE — HISTORY OF PRESENT ILLNESS
[FreeTextEntry8] : Finished antibiotic for Bronchitis \par developed Burping nausea after completing her Doxycycline

## 2020-01-13 NOTE — PHYSICAL EXAM
[Well Nourished] : well nourished [Well Developed] : well developed [No Acute Distress] : no acute distress [Well-Appearing] : well-appearing [Normal Sclera/Conjunctiva] : normal sclera/conjunctiva [PERRL] : pupils equal round and reactive to light [Normal Outer Ear/Nose] : the outer ears and nose were normal in appearance [EOMI] : extraocular movements intact [Normal Oropharynx] : the oropharynx was normal [No JVD] : no jugular venous distention [No Lymphadenopathy] : no lymphadenopathy [Supple] : supple [No Respiratory Distress] : no respiratory distress  [No Accessory Muscle Use] : no accessory muscle use [Thyroid Normal, No Nodules] : the thyroid was normal and there were no nodules present [Normal Rate] : normal rate  [Clear to Auscultation] : lungs were clear to auscultation bilaterally [Regular Rhythm] : with a regular rhythm [Normal S1, S2] : normal S1 and S2 [No Murmur] : no murmur heard [No Carotid Bruits] : no carotid bruits [No Abdominal Bruit] : a ~M bruit was not heard ~T in the abdomen [No Varicosities] : no varicosities [Pedal Pulses Present] : the pedal pulses are present [No Palpable Aorta] : no palpable aorta [No Edema] : there was no peripheral edema [No Extremity Clubbing/Cyanosis] : no extremity clubbing/cyanosis [Soft] : abdomen soft [Non-distended] : non-distended [No HSM] : no HSM [No Masses] : no abdominal mass palpated [Epigastric] : in the epigastric area [Normal Bowel Sounds] : normal bowel sounds [LLQ] : in the left lower quadrant [Normal Posterior Cervical Nodes] : no posterior cervical lymphadenopathy [Normal Anterior Cervical Nodes] : no anterior cervical lymphadenopathy [No Spinal Tenderness] : no spinal tenderness [No CVA Tenderness] : no CVA  tenderness [Grossly Normal Strength/Tone] : grossly normal strength/tone [No Joint Swelling] : no joint swelling [No Rash] : no rash [Coordination Grossly Intact] : coordination grossly intact [Deep Tendon Reflexes (DTR)] : deep tendon reflexes were 2+ and symmetric [Normal Gait] : normal gait [No Focal Deficits] : no focal deficits [Normal Affect] : the affect was normal [Normal Insight/Judgement] : insight and judgment were intact

## 2020-01-14 LAB
25(OH)D3 SERPL-MCNC: 34.6 NG/ML
ALBUMIN SERPL ELPH-MCNC: 4.6 G/DL
ALP BLD-CCNC: 65 U/L
ALT SERPL-CCNC: 11 U/L
AMYLASE/CREAT SERPL: 86 U/L
ANION GAP SERPL CALC-SCNC: 12 MMOL/L
AST SERPL-CCNC: 10 U/L
BASOPHILS # BLD AUTO: 0.06 K/UL
BASOPHILS NFR BLD AUTO: 0.8 %
BILIRUB SERPL-MCNC: 0.3 MG/DL
BUN SERPL-MCNC: 20 MG/DL
CALCIUM SERPL-MCNC: 9.7 MG/DL
CHLORIDE SERPL-SCNC: 102 MMOL/L
CHOLEST SERPL-MCNC: 137 MG/DL
CHOLEST/HDLC SERPL: 2.7 RATIO
CK SERPL-CCNC: 25 U/L
CO2 SERPL-SCNC: 28 MMOL/L
CREAT SERPL-MCNC: 1.37 MG/DL
EOSINOPHIL # BLD AUTO: 0.07 K/UL
EOSINOPHIL NFR BLD AUTO: 0.9 %
ESTIMATED AVERAGE GLUCOSE: 120 MG/DL
GLUCOSE SERPL-MCNC: 101 MG/DL
HBA1C MFR BLD HPLC: 5.8 %
HCT VFR BLD CALC: 32 %
HDLC SERPL-MCNC: 51 MG/DL
HGB BLD-MCNC: 10.1 G/DL
IMM GRANULOCYTES NFR BLD AUTO: 1.9 %
LDLC SERPL CALC-MCNC: 56 MG/DL
LPL SERPL-CCNC: 65 U/L
LYMPHOCYTES # BLD AUTO: 2.02 K/UL
LYMPHOCYTES NFR BLD AUTO: 25.8 %
MAN DIFF?: NORMAL
MCHC RBC-ENTMCNC: 31.6 GM/DL
MCHC RBC-ENTMCNC: 33.6 PG
MCV RBC AUTO: 106.3 FL
MONOCYTES # BLD AUTO: 0.96 K/UL
MONOCYTES NFR BLD AUTO: 12.3 %
NEUTROPHILS # BLD AUTO: 4.56 K/UL
NEUTROPHILS NFR BLD AUTO: 58.3 %
PLATELET # BLD AUTO: 320 K/UL
POTASSIUM SERPL-SCNC: 4.8 MMOL/L
PROT SERPL-MCNC: 6.4 G/DL
RBC # BLD: 3.01 M/UL
RBC # FLD: 18.8 %
SODIUM SERPL-SCNC: 142 MMOL/L
TRIGL SERPL-MCNC: 152 MG/DL
TSH SERPL-ACNC: 2.36 UIU/ML
WBC # FLD AUTO: 7.82 K/UL

## 2020-01-27 ENCOUNTER — APPOINTMENT (OUTPATIENT)
Dept: INTERNAL MEDICINE | Facility: CLINIC | Age: 80
End: 2020-01-27
Payer: MEDICARE

## 2020-01-27 VITALS
TEMPERATURE: 97.9 F | HEART RATE: 82 BPM | WEIGHT: 210 LBS | BODY MASS INDEX: 31.1 KG/M2 | SYSTOLIC BLOOD PRESSURE: 118 MMHG | HEIGHT: 69 IN | RESPIRATION RATE: 14 BRPM | DIASTOLIC BLOOD PRESSURE: 74 MMHG | OXYGEN SATURATION: 97 %

## 2020-01-27 DIAGNOSIS — Z87.09 PERSONAL HISTORY OF OTHER DISEASES OF THE RESPIRATORY SYSTEM: ICD-10-CM

## 2020-01-27 DIAGNOSIS — R05 COUGH: ICD-10-CM

## 2020-01-27 PROCEDURE — 99214 OFFICE O/P EST MOD 30 MIN: CPT

## 2020-01-27 NOTE — PHYSICAL EXAM
[No Acute Distress] : no acute distress [Well Nourished] : well nourished [Well Developed] : well developed [Well-Appearing] : well-appearing [Normal Sclera/Conjunctiva] : normal sclera/conjunctiva [PERRL] : pupils equal round and reactive to light [EOMI] : extraocular movements intact [Normal Outer Ear/Nose] : the outer ears and nose were normal in appearance [Normal Oropharynx] : the oropharynx was normal [No JVD] : no jugular venous distention [No Lymphadenopathy] : no lymphadenopathy [Supple] : supple [Thyroid Normal, No Nodules] : the thyroid was normal and there were no nodules present [No Respiratory Distress] : no respiratory distress  [No Accessory Muscle Use] : no accessory muscle use [Decreased Breath Sounds] : breath sounds were decreased diffusely [Normal Rate] : normal rate  [Regular Rhythm] : with a regular rhythm [Normal S1, S2] : normal S1 and S2 [No Murmur] : no murmur heard [No Carotid Bruits] : no carotid bruits [No Abdominal Bruit] : a ~M bruit was not heard ~T in the abdomen [No Varicosities] : no varicosities [Pedal Pulses Present] : the pedal pulses are present [No Edema] : there was no peripheral edema [No Palpable Aorta] : no palpable aorta [No Extremity Clubbing/Cyanosis] : no extremity clubbing/cyanosis [Soft] : abdomen soft [Non Tender] : non-tender [Non-distended] : non-distended [No Masses] : no abdominal mass palpated [No HSM] : no HSM [Normal Bowel Sounds] : normal bowel sounds [Normal Posterior Cervical Nodes] : no posterior cervical lymphadenopathy [Normal Anterior Cervical Nodes] : no anterior cervical lymphadenopathy [No CVA Tenderness] : no CVA  tenderness [No Spinal Tenderness] : no spinal tenderness [No Joint Swelling] : no joint swelling [Grossly Normal Strength/Tone] : grossly normal strength/tone [No Rash] : no rash [Coordination Grossly Intact] : coordination grossly intact [No Focal Deficits] : no focal deficits [Normal Gait] : normal gait [Deep Tendon Reflexes (DTR)] : deep tendon reflexes were 2+ and symmetric [Speech Grossly Normal] : speech grossly normal [Memory Grossly Normal] : memory grossly normal [Normal Affect] : the affect was normal [Alert and Oriented x3] : oriented to person, place, and time [Normal Mood] : the mood was normal [Normal Insight/Judgement] : insight and judgment were intact

## 2020-01-27 NOTE — HISTORY OF PRESENT ILLNESS
[Moderate] : moderate [___ Days ago] : [unfilled] days ago [Congestion] : congestion [Cough] : cough [Sore Throat] : sore throat [Worsening] : worsening [Wheezing] : no wheezing [Chills] : no chills [Anorexia] : no anorexia [Shortness Of Breath] : no shortness of breath [Fatigue] : not fatigue [Headache] : no headache [Fever] : no fever

## 2020-01-27 NOTE — REVIEW OF SYSTEMS
[Sore Throat] : sore throat [Cough] : cough [Negative] : Heme/Lymph [Shortness Of Breath] : no shortness of breath

## 2020-02-01 ENCOUNTER — TRANSCRIPTION ENCOUNTER (OUTPATIENT)
Age: 80
End: 2020-02-01

## 2020-02-07 ENCOUNTER — LABORATORY RESULT (OUTPATIENT)
Age: 80
End: 2020-02-07

## 2020-02-07 ENCOUNTER — APPOINTMENT (OUTPATIENT)
Dept: INTERNAL MEDICINE | Facility: CLINIC | Age: 80
End: 2020-02-07
Payer: MEDICARE

## 2020-02-07 ENCOUNTER — APPOINTMENT (OUTPATIENT)
Dept: CARDIOLOGY | Facility: CLINIC | Age: 80
End: 2020-02-07
Payer: MEDICARE

## 2020-02-07 VITALS
BODY MASS INDEX: 31.1 KG/M2 | DIASTOLIC BLOOD PRESSURE: 74 MMHG | HEART RATE: 82 BPM | SYSTOLIC BLOOD PRESSURE: 118 MMHG | WEIGHT: 210 LBS | HEIGHT: 69 IN

## 2020-02-07 VITALS
SYSTOLIC BLOOD PRESSURE: 138 MMHG | OXYGEN SATURATION: 91 % | TEMPERATURE: 98.3 F | DIASTOLIC BLOOD PRESSURE: 62 MMHG | HEART RATE: 65 BPM | RESPIRATION RATE: 14 BRPM

## 2020-02-07 DIAGNOSIS — Z87.09 PERSONAL HISTORY OF OTHER DISEASES OF THE RESPIRATORY SYSTEM: ICD-10-CM

## 2020-02-07 LAB
INR PPP: 1.5 RATIO
QUALITY CONTROL: YES
S PYO AG SPEC QL IA: NORMAL

## 2020-02-07 PROCEDURE — 99214 OFFICE O/P EST MOD 30 MIN: CPT

## 2020-02-07 PROCEDURE — 87880 STREP A ASSAY W/OPTIC: CPT | Mod: QW

## 2020-02-07 PROCEDURE — 93793 ANTICOAG MGMT PT WARFARIN: CPT

## 2020-02-07 PROCEDURE — 85610 PROTHROMBIN TIME: CPT | Mod: QW

## 2020-02-07 NOTE — PHYSICAL EXAM
[No Acute Distress] : no acute distress [Well Nourished] : well nourished [Well Developed] : well developed [Normal Sclera/Conjunctiva] : normal sclera/conjunctiva [Well-Appearing] : well-appearing [PERRL] : pupils equal round and reactive to light [EOMI] : extraocular movements intact [Normal Outer Ear/Nose] : the outer ears and nose were normal in appearance [No JVD] : no jugular venous distention [No Lymphadenopathy] : no lymphadenopathy [Supple] : supple [Thyroid Normal, No Nodules] : the thyroid was normal and there were no nodules present [No Respiratory Distress] : no respiratory distress  [No Accessory Muscle Use] : no accessory muscle use [Clear to Auscultation] : lungs were clear to auscultation bilaterally [Normal Rate] : normal rate  [Regular Rhythm] : with a regular rhythm [Normal S1, S2] : normal S1 and S2 [No Murmur] : no murmur heard [No Carotid Bruits] : no carotid bruits [No Abdominal Bruit] : a ~M bruit was not heard ~T in the abdomen [No Varicosities] : no varicosities [Pedal Pulses Present] : the pedal pulses are present [No Edema] : there was no peripheral edema [No Palpable Aorta] : no palpable aorta [No Extremity Clubbing/Cyanosis] : no extremity clubbing/cyanosis [Soft] : abdomen soft [Non Tender] : non-tender [Non-distended] : non-distended [No Masses] : no abdominal mass palpated [No HSM] : no HSM [Normal Bowel Sounds] : normal bowel sounds [Normal Supraclavicular Nodes] : no supraclavicular lymphadenopathy [Normal Posterior Cervical Nodes] : no posterior cervical lymphadenopathy [Normal Anterior Cervical Nodes] : no anterior cervical lymphadenopathy [No CVA Tenderness] : no CVA  tenderness [No Joint Swelling] : no joint swelling [No Spinal Tenderness] : no spinal tenderness [Grossly Normal Strength/Tone] : grossly normal strength/tone [No Rash] : no rash [Coordination Grossly Intact] : coordination grossly intact [Normal Gait] : normal gait [No Focal Deficits] : no focal deficits [Deep Tendon Reflexes (DTR)] : deep tendon reflexes were 2+ and symmetric [Memory Grossly Normal] : memory grossly normal [Speech Grossly Normal] : speech grossly normal [Normal Affect] : the affect was normal [Alert and Oriented x3] : oriented to person, place, and time [Normal Mood] : the mood was normal [Normal Insight/Judgement] : insight and judgment were intact [de-identified] : redness of throat

## 2020-02-08 LAB
BASOPHILS # BLD AUTO: 0 K/UL
BASOPHILS NFR BLD AUTO: 0 %
EOSINOPHIL # BLD AUTO: 0 K/UL
EOSINOPHIL NFR BLD AUTO: 0 %
HCT VFR BLD CALC: 29 %
HGB BLD-MCNC: 8.7 G/DL
LYMPHOCYTES # BLD AUTO: 1.24 K/UL
LYMPHOCYTES NFR BLD AUTO: 15.6 %
MAN DIFF?: NORMAL
MCHC RBC-ENTMCNC: 30 GM/DL
MCHC RBC-ENTMCNC: 31.6 PG
MCV RBC AUTO: 105.5 FL
MONOCYTES # BLD AUTO: 0.97 K/UL
MONOCYTES NFR BLD AUTO: 12.2 %
NEUTROPHILS # BLD AUTO: 5.66 K/UL
NEUTROPHILS NFR BLD AUTO: 71.3 %
PLATELET # BLD AUTO: 258 K/UL
RBC # BLD: 2.75 M/UL
RBC # FLD: 19.1 %
WBC # FLD AUTO: 7.94 K/UL

## 2020-02-12 ENCOUNTER — APPOINTMENT (OUTPATIENT)
Dept: CARDIOLOGY | Facility: CLINIC | Age: 80
End: 2020-02-12

## 2020-02-13 ENCOUNTER — OUTPATIENT (OUTPATIENT)
Dept: OUTPATIENT SERVICES | Facility: HOSPITAL | Age: 80
LOS: 1 days | End: 2020-02-13
Payer: MEDICARE

## 2020-02-13 VITALS
DIASTOLIC BLOOD PRESSURE: 74 MMHG | OXYGEN SATURATION: 96 % | TEMPERATURE: 98 F | HEART RATE: 55 BPM | SYSTOLIC BLOOD PRESSURE: 133 MMHG | RESPIRATION RATE: 12 BRPM

## 2020-02-13 VITALS
HEART RATE: 62 BPM | HEIGHT: 63 IN | OXYGEN SATURATION: 93 % | WEIGHT: 199.08 LBS | SYSTOLIC BLOOD PRESSURE: 147 MMHG | TEMPERATURE: 98 F | DIASTOLIC BLOOD PRESSURE: 81 MMHG | RESPIRATION RATE: 16 BRPM

## 2020-02-13 DIAGNOSIS — Z90.710 ACQUIRED ABSENCE OF BOTH CERVIX AND UTERUS: Chronic | ICD-10-CM

## 2020-02-13 DIAGNOSIS — D63.1 ANEMIA IN CHRONIC KIDNEY DISEASE: ICD-10-CM

## 2020-02-13 DIAGNOSIS — H26.40 UNSPECIFIED SECONDARY CATARACT: Chronic | ICD-10-CM

## 2020-02-13 DIAGNOSIS — S72.001A FRACTURE OF UNSPECIFIED PART OF NECK OF RIGHT FEMUR, INITIAL ENCOUNTER FOR CLOSED FRACTURE: Chronic | ICD-10-CM

## 2020-02-13 DIAGNOSIS — Z95.828 PRESENCE OF OTHER VASCULAR IMPLANTS AND GRAFTS: Chronic | ICD-10-CM

## 2020-02-13 DIAGNOSIS — Z98.89 OTHER SPECIFIED POSTPROCEDURAL STATES: Chronic | ICD-10-CM

## 2020-02-13 DIAGNOSIS — Z98.890 OTHER SPECIFIED POSTPROCEDURAL STATES: Chronic | ICD-10-CM

## 2020-02-13 LAB — BLD GP AB SCN SERPL QL: SIGNIFICANT CHANGE UP

## 2020-02-13 PROCEDURE — 86850 RBC ANTIBODY SCREEN: CPT

## 2020-02-13 PROCEDURE — 36415 COLL VENOUS BLD VENIPUNCTURE: CPT

## 2020-02-13 PROCEDURE — 86923 COMPATIBILITY TEST ELECTRIC: CPT

## 2020-02-13 PROCEDURE — 36430 TRANSFUSION BLD/BLD COMPNT: CPT

## 2020-02-13 PROCEDURE — 85027 COMPLETE CBC AUTOMATED: CPT

## 2020-02-13 PROCEDURE — P9016: CPT

## 2020-02-13 PROCEDURE — 86900 BLOOD TYPING SEROLOGIC ABO: CPT

## 2020-02-13 PROCEDURE — 86901 BLOOD TYPING SEROLOGIC RH(D): CPT

## 2020-02-13 RX ORDER — DIPHENHYDRAMINE HCL 50 MG
25 CAPSULE ORAL ONCE
Refills: 0 | Status: COMPLETED | OUTPATIENT
Start: 2020-02-13 | End: 2020-02-13

## 2020-02-13 RX ORDER — LANSOPRAZOLE 15 MG/1
1 CAPSULE, DELAYED RELEASE ORAL
Qty: 0 | Refills: 0 | DISCHARGE

## 2020-02-13 RX ORDER — CEPHALEXIN 500 MG
1 CAPSULE ORAL
Qty: 0 | Refills: 0 | DISCHARGE

## 2020-02-13 RX ORDER — ACETAMINOPHEN 500 MG
650 TABLET ORAL ONCE
Refills: 0 | Status: COMPLETED | OUTPATIENT
Start: 2020-02-13 | End: 2020-02-13

## 2020-02-13 RX ORDER — ESOMEPRAZOLE MAGNESIUM 40 MG/1
1 CAPSULE, DELAYED RELEASE ORAL
Qty: 0 | Refills: 0 | DISCHARGE

## 2020-02-13 RX ADMIN — Medication 25 MILLIGRAM(S): at 10:32

## 2020-02-13 RX ADMIN — Medication 650 MILLIGRAM(S): at 10:32

## 2020-02-18 ENCOUNTER — APPOINTMENT (OUTPATIENT)
Dept: INTERNAL MEDICINE | Facility: CLINIC | Age: 80
End: 2020-02-18
Payer: MEDICARE

## 2020-02-18 VITALS
HEIGHT: 69 IN | OXYGEN SATURATION: 95 % | DIASTOLIC BLOOD PRESSURE: 74 MMHG | WEIGHT: 210 LBS | RESPIRATION RATE: 14 BRPM | BODY MASS INDEX: 31.1 KG/M2 | TEMPERATURE: 97.4 F | HEART RATE: 68 BPM | SYSTOLIC BLOOD PRESSURE: 118 MMHG

## 2020-02-18 LAB
INR PPP: 1.9 RATIO
POCT-PROTHROMBIN TIME: 22.6 SECS
QUALITY CONTROL: YES

## 2020-02-18 PROCEDURE — 36415 COLL VENOUS BLD VENIPUNCTURE: CPT

## 2020-02-18 PROCEDURE — 85610 PROTHROMBIN TIME: CPT | Mod: QW

## 2020-02-18 PROCEDURE — 99214 OFFICE O/P EST MOD 30 MIN: CPT | Mod: 25

## 2020-02-18 NOTE — PHYSICAL EXAM
[No Acute Distress] : no acute distress [Well Nourished] : well nourished [Normal Voice/Communication] : normal voice/communication [Well Developed] : well developed [Well-Appearing] : well-appearing [Normal Sclera/Conjunctiva] : normal sclera/conjunctiva [PERRL] : pupils equal round and reactive to light [EOMI] : extraocular movements intact [Normal Outer Ear/Nose] : the outer ears and nose were normal in appearance [Normal Oropharynx] : the oropharynx was normal [No JVD] : no jugular venous distention [No Lymphadenopathy] : no lymphadenopathy [Supple] : supple [Thyroid Normal, No Nodules] : the thyroid was normal and there were no nodules present [No Respiratory Distress] : no respiratory distress  [No Accessory Muscle Use] : no accessory muscle use [Clear to Auscultation] : lungs were clear to auscultation bilaterally [Normal Rate] : normal rate  [Normal S1, S2] : normal S1 and S2 [Regular Rhythm] : with a regular rhythm [No Carotid Bruits] : no carotid bruits [No Abdominal Bruit] : a ~M bruit was not heard ~T in the abdomen [No Varicosities] : no varicosities [Pedal Pulses Present] : the pedal pulses are present [No Palpable Aorta] : no palpable aorta [No Extremity Clubbing/Cyanosis] : no extremity clubbing/cyanosis [Soft] : abdomen soft [Non Tender] : non-tender [Non-distended] : non-distended [No Masses] : no abdominal mass palpated [No HSM] : no HSM [Normal Bowel Sounds] : normal bowel sounds [Normal Supraclavicular Nodes] : no supraclavicular lymphadenopathy [Normal Posterior Cervical Nodes] : no posterior cervical lymphadenopathy [Normal Anterior Cervical Nodes] : no anterior cervical lymphadenopathy [No CVA Tenderness] : no CVA  tenderness [No Spinal Tenderness] : no spinal tenderness [No Joint Swelling] : no joint swelling [Grossly Normal Strength/Tone] : grossly normal strength/tone [No Rash] : no rash [No Focal Deficits] : no focal deficits [Coordination Grossly Intact] : coordination grossly intact [Normal Gait] : normal gait [Deep Tendon Reflexes (DTR)] : deep tendon reflexes were 2+ and symmetric [Speech Grossly Normal] : speech grossly normal [Memory Grossly Normal] : memory grossly normal [Normal Affect] : the affect was normal [Alert and Oriented x3] : oriented to person, place, and time [Normal Mood] : the mood was normal [Normal Insight/Judgement] : insight and judgment were intact [de-identified] : mild bilateral leg edema

## 2020-02-19 ENCOUNTER — APPOINTMENT (OUTPATIENT)
Dept: CARDIOLOGY | Facility: CLINIC | Age: 80
End: 2020-02-19
Payer: MEDICARE

## 2020-02-19 ENCOUNTER — NON-APPOINTMENT (OUTPATIENT)
Age: 80
End: 2020-02-19

## 2020-02-19 VITALS
HEART RATE: 58 BPM | OXYGEN SATURATION: 98 % | DIASTOLIC BLOOD PRESSURE: 68 MMHG | WEIGHT: 210 LBS | HEIGHT: 69 IN | SYSTOLIC BLOOD PRESSURE: 142 MMHG | BODY MASS INDEX: 31.1 KG/M2

## 2020-02-19 LAB
ANION GAP SERPL CALC-SCNC: 9 MMOL/L
BASOPHILS # BLD AUTO: 0.03 K/UL
BASOPHILS NFR BLD AUTO: 0.5 %
BUN SERPL-MCNC: 26 MG/DL
CALCIUM SERPL-MCNC: 10.1 MG/DL
CHLORIDE SERPL-SCNC: 103 MMOL/L
CO2 SERPL-SCNC: 27 MMOL/L
CREAT SERPL-MCNC: 1.4 MG/DL
EOSINOPHIL # BLD AUTO: 0.01 K/UL
EOSINOPHIL NFR BLD AUTO: 0.2 %
GLUCOSE SERPL-MCNC: 92 MG/DL
HCT VFR BLD CALC: 34.2 %
HGB BLD-MCNC: 10.4 G/DL
IMM GRANULOCYTES NFR BLD AUTO: 1 %
LYMPHOCYTES # BLD AUTO: 1.61 K/UL
LYMPHOCYTES NFR BLD AUTO: 27 %
MAN DIFF?: NORMAL
MCHC RBC-ENTMCNC: 30.4 GM/DL
MCHC RBC-ENTMCNC: 31.6 PG
MCV RBC AUTO: 104 FL
MONOCYTES # BLD AUTO: 0.7 K/UL
MONOCYTES NFR BLD AUTO: 11.7 %
NEUTROPHILS # BLD AUTO: 3.55 K/UL
NEUTROPHILS NFR BLD AUTO: 59.6 %
PLATELET # BLD AUTO: 164 K/UL
POTASSIUM SERPL-SCNC: 5.2 MMOL/L
RBC # BLD: 3.29 M/UL
RBC # FLD: 19.3 %
SODIUM SERPL-SCNC: 139 MMOL/L
WBC # FLD AUTO: 5.96 K/UL

## 2020-02-19 PROCEDURE — 99214 OFFICE O/P EST MOD 30 MIN: CPT

## 2020-02-19 PROCEDURE — 93000 ELECTROCARDIOGRAM COMPLETE: CPT

## 2020-02-19 NOTE — PHYSICAL EXAM
[General Appearance - Well Developed] : well developed [General Appearance - Well Nourished] : well nourished [Eyelids - No Xanthelasma] : the eyelids demonstrated no xanthelasmas [Normal Conjunctiva] : the conjunctiva exhibited no abnormalities [Normal Oral Mucosa] : normal oral mucosa [No Oral Pallor] : no oral pallor [Normal Jugular Venous A Waves Present] : normal jugular venous A waves present [No Oral Cyanosis] : no oral cyanosis [No Jugular Venous Keen A Waves] : no jugular venous keen A waves [Normal Jugular Venous V Waves Present] : normal jugular venous V waves present [Exaggerated Use Of Accessory Muscles For Inspiration] : no accessory muscle use [Respiration, Rhythm And Depth] : normal respiratory rhythm and effort [Auscultation Breath Sounds / Voice Sounds] : lungs were clear to auscultation bilaterally [Abdomen Tenderness] : non-tender [Abdomen Soft] : soft [Abdomen Mass (___ Cm)] : no abdominal mass palpated [Nail Clubbing] : no clubbing of the fingernails [Abnormal Walk] : normal gait [Petechial Hemorrhages (___cm)] : no petechial hemorrhages [Cyanosis, Localized] : no localized cyanosis [Skin Color & Pigmentation] : normal skin color and pigmentation [] : no rash [Skin Lesions] : no skin lesions [No Venous Stasis] : no venous stasis [Affect] : the affect was normal [Oriented To Time, Place, And Person] : oriented to person, place, and time [Mood] : the mood was normal [No Anxiety] : not feeling anxious [Rhythm Regular] : regular [Normal Rate] : normal [Normal S1] : normal S1 [Normal S2] : normal S2 [I] : a grade 1 [1+] : left 1+ [Right Carotid Bruit] : no bruit heard over the right carotid [Left Carotid Bruit] : no bruit heard over the left carotid [No Pitting Edema] : no pitting edema present [Lt] : varicose veins of the left leg noted [Rt] : varicose veins of the right leg noted

## 2020-02-19 NOTE — REVIEW OF SYSTEMS
[Feeling Fatigued] : not feeling fatigued [see HPI] : see HPI [Dyspnea on exertion] : not dyspnea during exertion [Shortness Of Breath] : no shortness of breath [Chest  Pressure] : no chest pressure [Chest Pain] : no chest pain [Lower Ext Edema] : no extremity edema [Leg Claudication] : no intermittent leg claudication [Palpitations] : palpitations [Change In The Stool] : change in stool [Joint Pain] : joint pain [Joint Stiffness] : joint stiffness [Dizziness] : dizziness [Under Stress] : not under stress [Negative] : Heme/Lymph

## 2020-02-19 NOTE — HISTORY OF PRESENT ILLNESS
[FreeTextEntry1] : \par She is now here for a followup visit.\par Since her last visit,  she had a prolonged episode of bronchitis that improved with antibiotics and prednisone. She also had acute sinusitis. On 1/28 she wasn’t feeling well and she rolled out of bed and landed on her right hip. She noted that a few days later she had stabbing right rib pain. \par More recently she noted that she was feeling weak and was noted to be more anemic. She now got \par a unit of blood and is feeling better.  \par \par Her dyspnea has not worsened. Her  \par She is still having significant bilateral hip pain.   \par   \par Her lower extremity edema has essentially resolved. She is drinking at least about 3 16oz bottles. \par \par She  denies any chest pain, PND, orthopnea, near syncope, syncope  strokelike symptoms. She is compliant with her other medications. No reported melena, hematochezia or hematemesis.  \par  She is still getting Procrit.

## 2020-02-19 NOTE — REASON FOR VISIT
[FreeTextEntry1] : 79 year-old woman with a history of GI bleeding , atrial fibrillation  status post pulmonary vein isolation at [facility] not on anticoagulation, COPD, diabetes, IPF, granuloma annulari (resolves with steroids) palpations. An event monitor demonstrated that her symptoms correlate to PAF/PAT episodes and APCs.\par we started her on Coumadin but even before she was therapeutic she started having bouts of rectal bleeding. Her Coumadin was then discontinued.\par After speaking to her colorectal surgeon we resumed her AC. \par \par in July of 2016 she had a fall  with hip fracture. Her course was cough with treated by an SBO requiring an exlap. She had a  left leg DVT and PE. She had a IVC filter placed. She had a  spontaneous hematoma develop and was taken off of AC. She also had multiple transfusions. Her Sotalol was discontinued and she was put on Amiodarone in order to help maintain SR rhythm. \par \par She was discharged and then developed a right leg DVT in rehab on 10/2016. She had been restarted on anticoagulation. She reports already requiring 3 PRBC transfusions in rehab. \par \par She had right hand carpal tunnel surgery on 6/27/17 at  with resulting hematoma while on Lovenox bridge. \par \par Her ABPM showed an average BP of 132/72.\par  She was also diagnosed with hyperparathyroidism by Dr. JOEL Jasmine.

## 2020-02-19 NOTE — DISCUSSION/SUMMARY
[FreeTextEntry1] : 79 year old woman with a history as listed above presents for a followup visit. \par \par [place] recently had a tough course marred by infectious issues. She is now on the mend. I believe this is the reason she likely dropped her Hb counts. Though given her history a GI source should be ruled out. She is pending fecal occult. \par She is now on Procrit. She will continue to followup with heme. \par \par  feeling better off of the metoprolol.  Given that her event monitor was significant for symptomatic PACS and a run of PSVT (PAT).  She did not have any signs of heart block or symptomatic bradycardia during the monitoring period.   She is tolerating the cardizem 30mg q8.She will continue  Amiodarone 100mg Qday given how symptomatic she is from her PAF. \par \par She denies any anginal symptoms. Clinically she is not in decompensated heart failure. Her lower extremity edema has  resolved.  Her physical exam was essentially unrevealing for any significant cardiovascular findings. Her EKG is unchanged from previous. \par \par her dyspnea is likely a function of her deconditioning. She will try to exercise more but likely will be limited by her hip issues. She will continue with aquatherapy which has helped. \par \par In 5/2019 her lipid profile was at goal. \par \par Her Hb has been stable since the last PRBC transfusion.  If her hemoglobin is still under 8 she should get a blood transfusion. \par \par She will have her INR checked in our [facility]. Her goal INR is 2-3 for CVA risk reduction and VTE prevention. \par \par She will followup with pulmonary (Dr. Gallegos). her last PFTs showed a moderate reduction in diffusion in 11/2017.  I will try to get a copy of her last PFTS. \par \par Her TSH was normal in 5/2019 She needs this checked annually as well. She went to opthalmology recently and had a normal check. \par \par \par She will followup with me in 2 month. She will followup with you for all of her other medical needs. \par

## 2020-02-20 ENCOUNTER — RX RENEWAL (OUTPATIENT)
Age: 80
End: 2020-02-20

## 2020-02-25 LAB — HEMOCCULT STL QL IA: NEGATIVE

## 2020-03-03 ENCOUNTER — APPOINTMENT (OUTPATIENT)
Dept: CARDIOLOGY | Facility: CLINIC | Age: 80
End: 2020-03-03
Payer: MEDICARE

## 2020-03-03 LAB
INR PPP: 2.1 RATIO
QUALITY CONTROL: YES

## 2020-03-03 PROCEDURE — 93793 ANTICOAG MGMT PT WARFARIN: CPT

## 2020-03-03 PROCEDURE — 85610 PROTHROMBIN TIME: CPT | Mod: QW

## 2020-03-09 ENCOUNTER — APPOINTMENT (OUTPATIENT)
Dept: INTERNAL MEDICINE | Facility: CLINIC | Age: 80
End: 2020-03-09
Payer: MEDICARE

## 2020-03-09 ENCOUNTER — RX RENEWAL (OUTPATIENT)
Age: 80
End: 2020-03-09

## 2020-03-09 VITALS
SYSTOLIC BLOOD PRESSURE: 110 MMHG | OXYGEN SATURATION: 94 % | HEIGHT: 69 IN | DIASTOLIC BLOOD PRESSURE: 80 MMHG | HEART RATE: 74 BPM | RESPIRATION RATE: 14 BRPM | TEMPERATURE: 98.3 F

## 2020-03-09 LAB
ALBUMIN SERPL ELPH-MCNC: 4.5 G/DL
ALP BLD-CCNC: 79 U/L
ALT SERPL-CCNC: 9 U/L
ANION GAP SERPL CALC-SCNC: 12 MMOL/L
AST SERPL-CCNC: 11 U/L
BASOPHILS # BLD AUTO: 0.04 K/UL
BASOPHILS NFR BLD AUTO: 0.7 %
BILIRUB SERPL-MCNC: 0.5 MG/DL
BUN SERPL-MCNC: 28 MG/DL
CALCIUM SERPL-MCNC: 10.1 MG/DL
CHLORIDE SERPL-SCNC: 102 MMOL/L
CO2 SERPL-SCNC: 27 MMOL/L
CREAT SERPL-MCNC: 1.62 MG/DL
EOSINOPHIL # BLD AUTO: 0.02 K/UL
EOSINOPHIL NFR BLD AUTO: 0.3 %
GLUCOSE SERPL-MCNC: 87 MG/DL
HCT VFR BLD CALC: 31.1 %
HGB BLD-MCNC: 9.6 G/DL
IMM GRANULOCYTES NFR BLD AUTO: 0.8 %
LYMPHOCYTES # BLD AUTO: 1.86 K/UL
LYMPHOCYTES NFR BLD AUTO: 31.3 %
MAN DIFF?: NORMAL
MCHC RBC-ENTMCNC: 30.9 GM/DL
MCHC RBC-ENTMCNC: 31.7 PG
MCV RBC AUTO: 102.6 FL
MONOCYTES # BLD AUTO: 0.64 K/UL
MONOCYTES NFR BLD AUTO: 10.8 %
NEUTROPHILS # BLD AUTO: 3.33 K/UL
NEUTROPHILS NFR BLD AUTO: 56.1 %
PLATELET # BLD AUTO: 265 K/UL
POTASSIUM SERPL-SCNC: 5.2 MMOL/L
PROT SERPL-MCNC: 6.2 G/DL
RBC # BLD: 3.03 M/UL
RBC # FLD: 19.9 %
SODIUM SERPL-SCNC: 140 MMOL/L
WBC # FLD AUTO: 5.94 K/UL

## 2020-03-09 PROCEDURE — 99214 OFFICE O/P EST MOD 30 MIN: CPT | Mod: 25

## 2020-03-09 PROCEDURE — 36415 COLL VENOUS BLD VENIPUNCTURE: CPT

## 2020-03-16 ENCOUNTER — APPOINTMENT (OUTPATIENT)
Dept: INTERNAL MEDICINE | Facility: CLINIC | Age: 80
End: 2020-03-16
Payer: MEDICARE

## 2020-03-16 ENCOUNTER — APPOINTMENT (OUTPATIENT)
Dept: CARDIOLOGY | Facility: CLINIC | Age: 80
End: 2020-03-16
Payer: MEDICARE

## 2020-03-16 VITALS
WEIGHT: 210 LBS | TEMPERATURE: 97.5 F | SYSTOLIC BLOOD PRESSURE: 118 MMHG | HEIGHT: 69 IN | DIASTOLIC BLOOD PRESSURE: 74 MMHG | RESPIRATION RATE: 14 BRPM | OXYGEN SATURATION: 91 % | BODY MASS INDEX: 31.1 KG/M2 | HEART RATE: 68 BPM

## 2020-03-16 LAB
ANION GAP SERPL CALC-SCNC: 12 MMOL/L
BASOPHILS # BLD AUTO: 0.02 K/UL
BASOPHILS NFR BLD AUTO: 0.4 %
BUN SERPL-MCNC: 33 MG/DL
CALCIUM SERPL-MCNC: 10.5 MG/DL
CHLORIDE SERPL-SCNC: 104 MMOL/L
CO2 SERPL-SCNC: 26 MMOL/L
CREAT SERPL-MCNC: 1.43 MG/DL
EOSINOPHIL # BLD AUTO: 0.01 K/UL
EOSINOPHIL NFR BLD AUTO: 0.2 %
GLUCOSE SERPL-MCNC: 97 MG/DL
HCT VFR BLD CALC: 32.3 %
HGB BLD-MCNC: 9.7 G/DL
IMM GRANULOCYTES NFR BLD AUTO: 2.1 %
INR PPP: 1.8 RATIO
LYMPHOCYTES # BLD AUTO: 1.56 K/UL
LYMPHOCYTES NFR BLD AUTO: 27.9 %
MAN DIFF?: NORMAL
MCHC RBC-ENTMCNC: 30 GM/DL
MCHC RBC-ENTMCNC: 31 PG
MCV RBC AUTO: 103.2 FL
MONOCYTES # BLD AUTO: 0.57 K/UL
MONOCYTES NFR BLD AUTO: 10.2 %
NEUTROPHILS # BLD AUTO: 3.31 K/UL
NEUTROPHILS NFR BLD AUTO: 59.2 %
PLATELET # BLD AUTO: 150 K/UL
POTASSIUM SERPL-SCNC: 4.9 MMOL/L
QUALITY CONTROL: YES
RBC # BLD: 3.13 M/UL
RBC # FLD: 20.7 %
SODIUM SERPL-SCNC: 141 MMOL/L
WBC # FLD AUTO: 5.59 K/UL

## 2020-03-16 PROCEDURE — 85610 PROTHROMBIN TIME: CPT | Mod: QW

## 2020-03-16 PROCEDURE — 93793 ANTICOAG MGMT PT WARFARIN: CPT

## 2020-03-16 PROCEDURE — 99214 OFFICE O/P EST MOD 30 MIN: CPT | Mod: 25

## 2020-03-16 PROCEDURE — 36415 COLL VENOUS BLD VENIPUNCTURE: CPT

## 2020-03-16 NOTE — PHYSICAL EXAM
[No Acute Distress] : no acute distress [Well Nourished] : well nourished [Well Developed] : well developed [Well-Appearing] : well-appearing [Normal Sclera/Conjunctiva] : normal sclera/conjunctiva [PERRL] : pupils equal round and reactive to light [EOMI] : extraocular movements intact [Normal Outer Ear/Nose] : the outer ears and nose were normal in appearance [Normal Oropharynx] : the oropharynx was normal [No JVD] : no jugular venous distention [No Lymphadenopathy] : no lymphadenopathy [Supple] : supple [Thyroid Normal, No Nodules] : the thyroid was normal and there were no nodules present [No Respiratory Distress] : no respiratory distress  [No Accessory Muscle Use] : no accessory muscle use [Clear to Auscultation] : lungs were clear to auscultation bilaterally [Normal Rate] : normal rate  [Regular Rhythm] : with a regular rhythm [Normal S1, S2] : normal S1 and S2 [No Carotid Bruits] : no carotid bruits [No Abdominal Bruit] : a ~M bruit was not heard ~T in the abdomen [No Varicosities] : no varicosities [Pedal Pulses Present] : the pedal pulses are present [No Edema] : there was no peripheral edema [No Palpable Aorta] : no palpable aorta [No Extremity Clubbing/Cyanosis] : no extremity clubbing/cyanosis [Soft] : abdomen soft [Non Tender] : non-tender [Non-distended] : non-distended [No Masses] : no abdominal mass palpated [No HSM] : no HSM [Normal Bowel Sounds] : normal bowel sounds [Normal Supraclavicular Nodes] : no supraclavicular lymphadenopathy [Normal Posterior Cervical Nodes] : no posterior cervical lymphadenopathy [Normal Anterior Cervical Nodes] : no anterior cervical lymphadenopathy [No CVA Tenderness] : no CVA  tenderness [No Spinal Tenderness] : no spinal tenderness [No Joint Swelling] : no joint swelling [Grossly Normal Strength/Tone] : grossly normal strength/tone [No Rash] : no rash [Coordination Grossly Intact] : coordination grossly intact [No Focal Deficits] : no focal deficits [Normal Gait] : normal gait [Deep Tendon Reflexes (DTR)] : deep tendon reflexes were 2+ and symmetric [Speech Grossly Normal] : speech grossly normal [Memory Grossly Normal] : memory grossly normal [Normal Affect] : the affect was normal [Alert and Oriented x3] : oriented to person, place, and time [Normal Mood] : the mood was normal [Normal Insight/Judgement] : insight and judgment were intact

## 2020-03-25 ENCOUNTER — APPOINTMENT (OUTPATIENT)
Dept: CARDIOLOGY | Facility: CLINIC | Age: 80
End: 2020-03-25
Payer: MEDICARE

## 2020-03-25 PROCEDURE — 85610 PROTHROMBIN TIME: CPT | Mod: QW

## 2020-03-25 PROCEDURE — 93793 ANTICOAG MGMT PT WARFARIN: CPT

## 2020-03-28 LAB
INR PPP: 2 RATIO
QUALITY CONTROL: YES

## 2020-04-22 ENCOUNTER — APPOINTMENT (OUTPATIENT)
Dept: CARDIOLOGY | Facility: CLINIC | Age: 80
End: 2020-04-22
Payer: MEDICARE

## 2020-04-22 ENCOUNTER — RX RENEWAL (OUTPATIENT)
Age: 80
End: 2020-04-22

## 2020-04-22 PROCEDURE — 85610 PROTHROMBIN TIME: CPT | Mod: QW

## 2020-04-22 PROCEDURE — 93793 ANTICOAG MGMT PT WARFARIN: CPT

## 2020-04-27 LAB
INR PPP: 2.4 RATIO
QUALITY CONTROL: YES

## 2020-05-27 ENCOUNTER — APPOINTMENT (OUTPATIENT)
Dept: INTERNAL MEDICINE | Facility: CLINIC | Age: 80
End: 2020-05-27
Payer: MEDICARE

## 2020-05-27 VITALS
WEIGHT: 200 LBS | HEIGHT: 69 IN | OXYGEN SATURATION: 92 % | DIASTOLIC BLOOD PRESSURE: 72 MMHG | HEART RATE: 66 BPM | RESPIRATION RATE: 14 BRPM | SYSTOLIC BLOOD PRESSURE: 120 MMHG | TEMPERATURE: 98.9 F | BODY MASS INDEX: 29.62 KG/M2

## 2020-05-27 LAB
INR PPP: 2.7 RATIO
POCT-PROTHROMBIN TIME: 32.8 SECS
QUALITY CONTROL: YES

## 2020-05-27 PROCEDURE — 36415 COLL VENOUS BLD VENIPUNCTURE: CPT

## 2020-05-27 PROCEDURE — 99214 OFFICE O/P EST MOD 30 MIN: CPT | Mod: 25

## 2020-05-27 PROCEDURE — 85610 PROTHROMBIN TIME: CPT | Mod: QW

## 2020-05-27 NOTE — REVIEW OF SYSTEMS
[Joint Pain] : joint pain [Muscle Pain] : muscle pain [Dizziness] : dizziness [Negative] : Heme/Lymph

## 2020-05-27 NOTE — PHYSICAL EXAM
[No Acute Distress] : no acute distress [Well Nourished] : well nourished [Well Developed] : well developed [Well-Appearing] : well-appearing [Normal Voice/Communication] : normal voice/communication [Normal Sclera/Conjunctiva] : normal sclera/conjunctiva [PERRL] : pupils equal round and reactive to light [EOMI] : extraocular movements intact [Normal Outer Ear/Nose] : the outer ears and nose were normal in appearance [No JVD] : no jugular venous distention [No Lymphadenopathy] : no lymphadenopathy [Supple] : supple [Thyroid Normal, No Nodules] : the thyroid was normal and there were no nodules present [No Respiratory Distress] : no respiratory distress  [Clear to Auscultation] : lungs were clear to auscultation bilaterally [No Accessory Muscle Use] : no accessory muscle use [Normal Rate] : normal rate  [Regular Rhythm] : with a regular rhythm [Normal S1, S2] : normal S1 and S2 [No Murmur] : no murmur heard [No Carotid Bruits] : no carotid bruits [No Abdominal Bruit] : a ~M bruit was not heard ~T in the abdomen [No Varicosities] : no varicosities [Pedal Pulses Present] : the pedal pulses are present [No Edema] : there was no peripheral edema [No Palpable Aorta] : no palpable aorta [No Extremity Clubbing/Cyanosis] : no extremity clubbing/cyanosis [Soft] : abdomen soft [Non Tender] : non-tender [Non-distended] : non-distended [No Masses] : no abdominal mass palpated [No HSM] : no HSM [Normal Bowel Sounds] : normal bowel sounds [Normal Posterior Cervical Nodes] : no posterior cervical lymphadenopathy [Normal Anterior Cervical Nodes] : no anterior cervical lymphadenopathy [No CVA Tenderness] : no CVA  tenderness [No Spinal Tenderness] : no spinal tenderness [Grossly Normal Strength/Tone] : grossly normal strength/tone [No Joint Swelling] : no joint swelling [No Rash] : no rash [Coordination Grossly Intact] : coordination grossly intact [Normal Gait] : normal gait [No Focal Deficits] : no focal deficits [Deep Tendon Reflexes (DTR)] : deep tendon reflexes were 2+ and symmetric [Speech Grossly Normal] : speech grossly normal [Memory Grossly Normal] : memory grossly normal [Normal Affect] : the affect was normal [Alert and Oriented x3] : oriented to person, place, and time [Normal Mood] : the mood was normal [Normal Insight/Judgement] : insight and judgment were intact

## 2020-05-28 LAB
ALBUMIN SERPL ELPH-MCNC: 4.7 G/DL
ALP BLD-CCNC: 76 U/L
ALT SERPL-CCNC: 10 U/L
ANION GAP SERPL CALC-SCNC: 15 MMOL/L
AST SERPL-CCNC: 13 U/L
BASOPHILS # BLD AUTO: 0.04 K/UL
BASOPHILS NFR BLD AUTO: 0.6 %
BILIRUB SERPL-MCNC: 0.3 MG/DL
BUN SERPL-MCNC: 28 MG/DL
CALCIUM SERPL-MCNC: 10.6 MG/DL
CHLORIDE SERPL-SCNC: 104 MMOL/L
CHOLEST SERPL-MCNC: 136 MG/DL
CHOLEST/HDLC SERPL: 3 RATIO
CK SERPL-CCNC: 31 U/L
CO2 SERPL-SCNC: 24 MMOL/L
CREAT SERPL-MCNC: 1.53 MG/DL
EOSINOPHIL # BLD AUTO: 0.07 K/UL
EOSINOPHIL NFR BLD AUTO: 1.1 %
ESTIMATED AVERAGE GLUCOSE: 108 MG/DL
GLUCOSE SERPL-MCNC: 91 MG/DL
HBA1C MFR BLD HPLC: 5.4 %
HCT VFR BLD CALC: 33.8 %
HDLC SERPL-MCNC: 46 MG/DL
HGB BLD-MCNC: 10.3 G/DL
IMM GRANULOCYTES NFR BLD AUTO: 2 %
LDLC SERPL CALC-MCNC: 60 MG/DL
LYMPHOCYTES # BLD AUTO: 1.88 K/UL
LYMPHOCYTES NFR BLD AUTO: 28.4 %
MAN DIFF?: NORMAL
MCHC RBC-ENTMCNC: 30.5 GM/DL
MCHC RBC-ENTMCNC: 32.4 PG
MCV RBC AUTO: 106.3 FL
MONOCYTES # BLD AUTO: 0.63 K/UL
MONOCYTES NFR BLD AUTO: 9.5 %
NEUTROPHILS # BLD AUTO: 3.86 K/UL
NEUTROPHILS NFR BLD AUTO: 58.4 %
PLATELET # BLD AUTO: 209 K/UL
POTASSIUM SERPL-SCNC: 4.8 MMOL/L
PROT SERPL-MCNC: 6.5 G/DL
RBC # BLD: 3.18 M/UL
RBC # FLD: 19.5 %
SODIUM SERPL-SCNC: 142 MMOL/L
TRIGL SERPL-MCNC: 152 MG/DL
WBC # FLD AUTO: 6.61 K/UL

## 2020-06-15 ENCOUNTER — RX RENEWAL (OUTPATIENT)
Age: 80
End: 2020-06-15

## 2020-06-25 ENCOUNTER — APPOINTMENT (OUTPATIENT)
Dept: CARDIOLOGY | Facility: CLINIC | Age: 80
End: 2020-06-25
Payer: MEDICARE

## 2020-06-25 VITALS — BODY MASS INDEX: 29.62 KG/M2 | HEART RATE: 65 BPM | WEIGHT: 200 LBS | HEIGHT: 69 IN | RESPIRATION RATE: 14 BRPM

## 2020-06-25 DIAGNOSIS — Z79.01 LONG TERM (CURRENT) USE OF ANTICOAGULANTS: ICD-10-CM

## 2020-06-25 LAB
INR PPP: 2.8 RATIO
QUALITY CONTROL: YES

## 2020-06-25 PROCEDURE — 85610 PROTHROMBIN TIME: CPT | Mod: QW

## 2020-06-25 PROCEDURE — 93793 ANTICOAG MGMT PT WARFARIN: CPT

## 2020-07-06 ENCOUNTER — APPOINTMENT (OUTPATIENT)
Dept: CARDIOLOGY | Facility: CLINIC | Age: 80
End: 2020-07-06
Payer: MEDICARE

## 2020-07-06 ENCOUNTER — NON-APPOINTMENT (OUTPATIENT)
Age: 80
End: 2020-07-06

## 2020-07-06 VITALS
HEART RATE: 62 BPM | BODY MASS INDEX: 31.39 KG/M2 | DIASTOLIC BLOOD PRESSURE: 79 MMHG | HEIGHT: 67 IN | SYSTOLIC BLOOD PRESSURE: 135 MMHG | OXYGEN SATURATION: 96 % | WEIGHT: 200 LBS

## 2020-07-06 PROCEDURE — 93000 ELECTROCARDIOGRAM COMPLETE: CPT

## 2020-07-06 PROCEDURE — 99214 OFFICE O/P EST MOD 30 MIN: CPT

## 2020-07-06 NOTE — HISTORY OF PRESENT ILLNESS
[FreeTextEntry1] : \par She is now here for a followup visit.\par Since her last visit, she has been quarantined at home because of the COVID pandemic. \par She is still having significant bilateral hip pain.  She has been doing more physical therapy. She is doing aquatherapy. \par She is still complaining of intermittent lightheadedness which has been unchanged from previous. She is still having intermittent short palpitations that are lasting only for a few seconds. This is unchanged as well. \par Two weeks ago she became dyspneic when walking in the heat. She has not had any recurrent symptoms. \par Her lower extremity edema has essentially resolved.  \par She  denies any chest pain, PND, orthopnea, near syncope, syncope  strokelike symptoms. She is compliant with her other medications. No reported melena, hematochezia or hematemesis.  \par  She is still getting Procrit.

## 2020-07-06 NOTE — REASON FOR VISIT
[FreeTextEntry1] : 79 year-old woman with a history of GI bleeding , atrial fibrillation  status post pulmonary vein isolation at [facility] not on anticoagulation, COPD, diabetes, IPF, granuloma annulari (resolves with steroids) palpations. An event monitor demonstrated that her symptoms correlate to PAF/PAT episodes and APCs.\par we started her on Coumadin but even before she was therapeutic she started having bouts of rectal bleeding. Her Coumadin was then discontinued.\par After speaking to her colorectal surgeon we resumed her AC. \par \par in July of 2016 she had a fall  with hip fracture. Her course was cough with treated by an SBO requiring an exlap. She had a  left leg DVT and PE. She had a IVC filter placed. She had a  spontaneous hematoma develop and was taken off of AC. She also had multiple transfusions. Her Sotalol was discontinued and she was put on Amiodarone in order to help maintain SR rhythm. \par \par She was discharged and then developed a right leg DVT in rehab on 10/2016. She had been restarted on anticoagulation. She reports already requiring 3 PRBC transfusions in rehab. \par \par She had right hand carpal tunnel surgery on 6/27/17 at  with resulting hematoma while on Lovenox bridge. \par \par Her ABPM showed an average BP of 132/72.\par  She was also diagnosed with hyperparathyroidism by Dr. JOEL Jasmine.

## 2020-07-06 NOTE — DISCUSSION/SUMMARY
[FreeTextEntry1] : 79 year old woman with a history as listed above presents for a followup visit. \par \par Carolina is relatively stable.  Her recent episode of self limiting dyspnea was likely due to her deconditioning and ambient heat. her dyspnea is likely a function of her deconditioning. She will try to exercise more but likely will be limited by her hip issues. She will resume with aquatherapy which has helped\par \par She feeling better off of the metoprolol.  Given that her event monitor was significant for symptomatic PACS and a run of PSVT (PAT).  She did not have any signs of heart block or symptomatic bradycardia during the monitoring period.   She is tolerating the cardizem 30mg q8.She will continue  Amiodarone 100mg Qday given how symptomatic she is from her PAF. \par \par She denies any anginal symptoms. Clinically she is not in decompensated heart failure. Her lower extremity edema has  resolved.  Her physical exam was essentially unrevealing for any significant cardiovascular findings. Her EKG is unchanged from previous. \par \par In 5/2020 her lipid profile was at goal. \par \par Her Hb has been stable since the last PRBC transfusion.  If her hemoglobin is still under 8 she should get a blood transfusion. She is now on Procrit. She will continue to followup with heme. \par \par She will have her INR checked in our [facility]. Her goal INR is 2-3 for CVA risk reduction and VTE prevention. \par \par She will followup with pulmonary (Dr. Gallegos). her last PFTs showed a moderate reduction in diffusion in 11/2017. Given the COVID pandemic PFTs will have to be deferred till safe to perform. \par \par Her TSH was normal in 1/2020.  She needs this checked annually as well. She went to opthalmology recently and had a normal check. \par \par She will followup with me in 2 month. She will followup with you for all of her other medical needs. \par

## 2020-07-06 NOTE — PHYSICAL EXAM
[General Appearance - Well Developed] : well developed [Normal Conjunctiva] : the conjunctiva exhibited no abnormalities [General Appearance - Well Nourished] : well nourished [Eyelids - No Xanthelasma] : the eyelids demonstrated no xanthelasmas [Normal Oral Mucosa] : normal oral mucosa [No Oral Pallor] : no oral pallor [No Oral Cyanosis] : no oral cyanosis [Normal Jugular Venous A Waves Present] : normal jugular venous A waves present [Normal Jugular Venous V Waves Present] : normal jugular venous V waves present [No Jugular Venous Keen A Waves] : no jugular venous keen A waves [Respiration, Rhythm And Depth] : normal respiratory rhythm and effort [Exaggerated Use Of Accessory Muscles For Inspiration] : no accessory muscle use [Auscultation Breath Sounds / Voice Sounds] : lungs were clear to auscultation bilaterally [Abdomen Soft] : soft [Abdomen Tenderness] : non-tender [Abdomen Mass (___ Cm)] : no abdominal mass palpated [Abnormal Walk] : normal gait [Cyanosis, Localized] : no localized cyanosis [Nail Clubbing] : no clubbing of the fingernails [Petechial Hemorrhages (___cm)] : no petechial hemorrhages [Skin Color & Pigmentation] : normal skin color and pigmentation [] : no rash [No Venous Stasis] : no venous stasis [Skin Lesions] : no skin lesions [Oriented To Time, Place, And Person] : oriented to person, place, and time [Affect] : the affect was normal [Mood] : the mood was normal [No Anxiety] : not feeling anxious [Normal Rate] : normal [Rhythm Regular] : regular [Normal S2] : normal S2 [Normal S1] : normal S1 [I] : a grade 1 [1+] : left 1+ [Right Carotid Bruit] : no bruit heard over the right carotid [No Pitting Edema] : no pitting edema present [Rt] : varicose veins of the right leg noted [Left Carotid Bruit] : no bruit heard over the left carotid [Lt] : varicose veins of the left leg noted

## 2020-07-06 NOTE — REVIEW OF SYSTEMS
[Feeling Fatigued] : not feeling fatigued [see HPI] : see HPI [Shortness Of Breath] : no shortness of breath [Dyspnea on exertion] : not dyspnea during exertion [Chest Pain] : no chest pain [Chest  Pressure] : no chest pressure [Lower Ext Edema] : no extremity edema [Palpitations] : palpitations [Leg Claudication] : no intermittent leg claudication [Change In The Stool] : change in stool [Joint Pain] : joint pain [Dizziness] : dizziness [Joint Stiffness] : joint stiffness [Under Stress] : not under stress [Negative] : Endocrine

## 2020-08-04 ENCOUNTER — APPOINTMENT (OUTPATIENT)
Dept: CARDIOLOGY | Facility: CLINIC | Age: 80
End: 2020-08-04

## 2020-08-11 ENCOUNTER — APPOINTMENT (OUTPATIENT)
Dept: CARDIOLOGY | Facility: CLINIC | Age: 80
End: 2020-08-11
Payer: MEDICARE

## 2020-08-11 VITALS — HEIGHT: 67 IN | HEART RATE: 60 BPM | BODY MASS INDEX: 31.39 KG/M2 | RESPIRATION RATE: 14 BRPM | WEIGHT: 200 LBS

## 2020-08-11 LAB
INR PPP: 2.4 RATIO
QUALITY CONTROL: YES

## 2020-08-11 PROCEDURE — 93793 ANTICOAG MGMT PT WARFARIN: CPT

## 2020-08-11 PROCEDURE — 85610 PROTHROMBIN TIME: CPT | Mod: QW

## 2020-09-08 ENCOUNTER — NON-APPOINTMENT (OUTPATIENT)
Age: 80
End: 2020-09-08

## 2020-09-08 ENCOUNTER — APPOINTMENT (OUTPATIENT)
Dept: CARDIOLOGY | Facility: CLINIC | Age: 80
End: 2020-09-08
Payer: MEDICARE

## 2020-09-08 VITALS
BODY MASS INDEX: 31.39 KG/M2 | SYSTOLIC BLOOD PRESSURE: 118 MMHG | HEIGHT: 67 IN | DIASTOLIC BLOOD PRESSURE: 69 MMHG | HEART RATE: 69 BPM | WEIGHT: 200 LBS | OXYGEN SATURATION: 93 %

## 2020-09-08 LAB
INR PPP: 2.3 RATIO
QUALITY CONTROL: YES

## 2020-09-08 PROCEDURE — 93000 ELECTROCARDIOGRAM COMPLETE: CPT

## 2020-09-08 PROCEDURE — 99214 OFFICE O/P EST MOD 30 MIN: CPT

## 2020-09-08 PROCEDURE — 93793 ANTICOAG MGMT PT WARFARIN: CPT

## 2020-09-08 NOTE — HISTORY OF PRESENT ILLNESS
[FreeTextEntry1] : \par She is now here for a followup visit.\par Since her last visit, she has been quarantined at home because of the COVID pandemic. She has been overall sedentary. \par She is still having significant bilateral hip pain.   She is doing aquatherapy 2 times a week. \par \par She is still complaining of intermittent lightheadedness which is happening when she changes positions especially after sitting for prolong periods of time. The symptoms last for about 1 minute. She has not had any syncope. \par \par She is still having intermittent short palpitations that are lasting only for a few seconds. This is unchanged as well. \par \par \par Her lower extremity edema has essentially resolved.  She  denies any chest pain, PND, orthopnea, near syncope, syncope  strokelike symptoms. She is compliant with her other medications. No reported melena, hematochezia or hematemesis.  \par  She is still getting Procrit.

## 2020-09-08 NOTE — PHYSICAL EXAM
[General Appearance - Well Nourished] : well nourished [General Appearance - Well Developed] : well developed [Normal Conjunctiva] : the conjunctiva exhibited no abnormalities [Eyelids - No Xanthelasma] : the eyelids demonstrated no xanthelasmas [Normal Oral Mucosa] : normal oral mucosa [No Oral Pallor] : no oral pallor [No Oral Cyanosis] : no oral cyanosis [Normal Jugular Venous A Waves Present] : normal jugular venous A waves present [Normal Jugular Venous V Waves Present] : normal jugular venous V waves present [No Jugular Venous Keen A Waves] : no jugular venous keen A waves [Respiration, Rhythm And Depth] : normal respiratory rhythm and effort [Exaggerated Use Of Accessory Muscles For Inspiration] : no accessory muscle use [Auscultation Breath Sounds / Voice Sounds] : lungs were clear to auscultation bilaterally [Abdomen Tenderness] : non-tender [Abdomen Soft] : soft [Abnormal Walk] : normal gait [Abdomen Mass (___ Cm)] : no abdominal mass palpated [Nail Clubbing] : no clubbing of the fingernails [Cyanosis, Localized] : no localized cyanosis [Petechial Hemorrhages (___cm)] : no petechial hemorrhages [Skin Color & Pigmentation] : normal skin color and pigmentation [] : no rash [No Venous Stasis] : no venous stasis [Skin Lesions] : no skin lesions [Oriented To Time, Place, And Person] : oriented to person, place, and time [Mood] : the mood was normal [Affect] : the affect was normal [No Anxiety] : not feeling anxious [Rhythm Regular] : regular [Normal Rate] : normal [Normal S1] : normal S1 [I] : a grade 1 [Normal S2] : normal S2 [1+] : left 1+ [No Pitting Edema] : no pitting edema present [Rt] : varicose veins of the right leg noted [Lt] : varicose veins of the left leg noted [Right Carotid Bruit] : no bruit heard over the right carotid [Left Carotid Bruit] : no bruit heard over the left carotid

## 2020-09-08 NOTE — REVIEW OF SYSTEMS
[see HPI] : see HPI [Change In The Stool] : change in stool [Joint Pain] : joint pain [Joint Stiffness] : joint stiffness [Dizziness] : dizziness [Negative] : Heme/Lymph [Feeling Fatigued] : not feeling fatigued [Chest  Pressure] : no chest pressure [Shortness Of Breath] : no shortness of breath [Dyspnea on exertion] : not dyspnea during exertion [Chest Pain] : no chest pain [Leg Claudication] : no intermittent leg claudication [Lower Ext Edema] : no extremity edema [Under Stress] : not under stress [Palpitations] : no palpitations

## 2020-09-08 NOTE — CARDIOLOGY SUMMARY
[___] : [unfilled] [None] : normal LV function [No Ischemia] : no Ischemia [Normal] : normal LA size [Mild] : mild mitral regurgitation

## 2020-09-08 NOTE — REASON FOR VISIT
[FreeTextEntry1] : 79 year-old woman with a history of GI bleeding , atrial fibrillation  status post pulmonary vein isolation at [facility] not on anticoagulation, COPD, diabetes, IPF, granuloma annulari (resolves with steroids) palpations. An event monitor demonstrated that her symptoms correlate to PAF/PAT episodes and APCs.\par we started her on Coumadin but even before she was therapeutic she started having bouts of rectal bleeding. Her Coumadin was then discontinued.\par After speaking to her colorectal surgeon we resumed her AC. \par \par in July of 2016 she had a fall  with hip fracture. Her course was cough with treated by an SBO requiring an exlap. She had a  left leg DVT and PE. She had a IVC filter placed. She had a  spontaneous hematoma develop and was taken off of AC. She also had multiple transfusions. Her Sotalol was discontinued and she was put on Amiodarone in order to help maintain SR rhythm. \par \par She was discharged and then developed a right leg DVT in rehab on 10/2016. She had been restarted on anticoagulation. She reports already requiring 3 PRBC transfusions in rehab. \par \par She had right hand carpal tunnel surgery on 6/27/17 at  with resulting hematoma while on Lovenox bridge. \par \par Her ABPM showed an average BP of 132/72.\par  She was also diagnosed with hyperparathyroidism by Dr. JOEL Jasmine.

## 2020-09-08 NOTE — DISCUSSION/SUMMARY
[FreeTextEntry1] : 79 year old woman with a history as listed above presents for a followup visit. \par \par [place] is relatively stable.   She will try to exercise more but likely will be limited by her hip issues. She will continue with aquatherapy. \par \par Her lightheadedness is likely from mild orthostasis. She will try to get up slower. \par \par She feeling better off of the metoprolol.  Given that her event monitor was significant for symptomatic PACS and a run of PSVT (PAT).  She did not have any signs of heart block or symptomatic bradycardia during the monitoring period.   She is tolerating the cardizem 30mg q8. She will continue  Amiodarone 100mg Qday given how symptomatic she is from her PAF. \par \par She denies any anginal symptoms. Clinically she is not in decompensated heart failure. Her lower extremity edema has  resolved.  Her physical exam was essentially unrevealing for any significant cardiovascular findings. Her EKG is unchanged from previous. \par \par In 5/2020 her lipid profile was at goal. \par \par Her Hb has been stable since the last PRBC transfusion.  If her hemoglobin is still under 8 she should get a blood transfusion. She is now on Procrit. She will continue to followup with heme. \par \par She will have her INR checked in our [facility]madin Clinic. Her goal INR is 2-3 for CVA risk reduction and VTE prevention. \par \par She will followup with pulmonary (Dr. Gallegos). her last PFTs showed a moderate reduction in diffusion in 11/2017. Given the COVID pandemic PFTs will have to be deferred till safe to perform. \par \par Her TSH was normal in 1/2020.  She needs this checked annually as well. She went to opthalmology recently and had a normal check. \par \par She will followup with me in 2-3 month. She will followup with you for all of her other medical needs. \par

## 2020-09-23 ENCOUNTER — APPOINTMENT (OUTPATIENT)
Dept: INTERNAL MEDICINE | Facility: CLINIC | Age: 80
End: 2020-09-23
Payer: MEDICARE

## 2020-09-23 VITALS — DIASTOLIC BLOOD PRESSURE: 70 MMHG | SYSTOLIC BLOOD PRESSURE: 116 MMHG

## 2020-09-23 VITALS
OXYGEN SATURATION: 98 % | DIASTOLIC BLOOD PRESSURE: 78 MMHG | HEART RATE: 69 BPM | BODY MASS INDEX: 31.39 KG/M2 | SYSTOLIC BLOOD PRESSURE: 132 MMHG | HEIGHT: 67 IN | TEMPERATURE: 97.2 F | WEIGHT: 200 LBS | RESPIRATION RATE: 14 BRPM

## 2020-09-23 DIAGNOSIS — Z23 ENCOUNTER FOR IMMUNIZATION: ICD-10-CM

## 2020-09-23 PROCEDURE — 90662 IIV NO PRSV INCREASED AG IM: CPT

## 2020-09-23 PROCEDURE — G0008: CPT

## 2020-09-23 PROCEDURE — 99214 OFFICE O/P EST MOD 30 MIN: CPT | Mod: 25

## 2020-09-23 NOTE — PHYSICAL EXAM
[No Acute Distress] : no acute distress [Well Nourished] : well nourished [Well Developed] : well developed [Well-Appearing] : well-appearing [Normal Sclera/Conjunctiva] : normal sclera/conjunctiva [PERRL] : pupils equal round and reactive to light [EOMI] : extraocular movements intact [Normal Outer Ear/Nose] : the outer ears and nose were normal in appearance [Normal Oropharynx] : the oropharynx was normal [No JVD] : no jugular venous distention [No Lymphadenopathy] : no lymphadenopathy [Supple] : supple [Thyroid Normal, No Nodules] : the thyroid was normal and there were no nodules present [No Respiratory Distress] : no respiratory distress  [No Accessory Muscle Use] : no accessory muscle use [Clear to Auscultation] : lungs were clear to auscultation bilaterally [Normal Rate] : normal rate  [Normal S1, S2] : normal S1 and S2 [No Murmur] : no murmur heard [Irregularly Irregular] : irregularly irregular [No Carotid Bruits] : no carotid bruits [No Abdominal Bruit] : a ~M bruit was not heard ~T in the abdomen [No Varicosities] : no varicosities [Pedal Pulses Present] : the pedal pulses are present [No Edema] : there was no peripheral edema [No Palpable Aorta] : no palpable aorta [No Extremity Clubbing/Cyanosis] : no extremity clubbing/cyanosis [Soft] : abdomen soft [Non Tender] : non-tender [Non-distended] : non-distended [No Masses] : no abdominal mass palpated [No HSM] : no HSM [Normal Bowel Sounds] : normal bowel sounds [Normal Posterior Cervical Nodes] : no posterior cervical lymphadenopathy [Normal Anterior Cervical Nodes] : no anterior cervical lymphadenopathy [No CVA Tenderness] : no CVA  tenderness [No Spinal Tenderness] : no spinal tenderness [No Joint Swelling] : no joint swelling [Grossly Normal Strength/Tone] : grossly normal strength/tone [No Rash] : no rash [Coordination Grossly Intact] : coordination grossly intact [No Focal Deficits] : no focal deficits [Normal Gait] : normal gait [Normal Affect] : the affect was normal [Normal Insight/Judgement] : insight and judgment were intact

## 2020-09-23 NOTE — HEALTH RISK ASSESSMENT
[No] : In the past 12 months have you used drugs other than those required for medical reasons? No [No falls in past year] : Patient reported no falls in the past year [0] : 2) Feeling down, depressed, or hopeless: Not at all (0) [] : No [[id number]] : 0

## 2020-09-24 LAB
25(OH)D3 SERPL-MCNC: 35.2 NG/ML
ALBUMIN SERPL ELPH-MCNC: 4.5 G/DL
ALP BLD-CCNC: 71 U/L
ALT SERPL-CCNC: 8 U/L
ANION GAP SERPL CALC-SCNC: 11 MMOL/L
AST SERPL-CCNC: 11 U/L
BASOPHILS # BLD AUTO: 0.03 K/UL
BASOPHILS NFR BLD AUTO: 0.5 %
BILIRUB SERPL-MCNC: 0.3 MG/DL
BUN SERPL-MCNC: 18 MG/DL
CALCIUM SERPL-MCNC: 9.9 MG/DL
CHLORIDE SERPL-SCNC: 103 MMOL/L
CHOLEST SERPL-MCNC: 127 MG/DL
CHOLEST/HDLC SERPL: 2.3 RATIO
CK SERPL-CCNC: 30 U/L
CO2 SERPL-SCNC: 27 MMOL/L
CREAT SERPL-MCNC: 1.26 MG/DL
EOSINOPHIL # BLD AUTO: 0.03 K/UL
EOSINOPHIL NFR BLD AUTO: 0.5 %
ESTIMATED AVERAGE GLUCOSE: 103 MG/DL
GLUCOSE SERPL-MCNC: 86 MG/DL
HBA1C MFR BLD HPLC: 5.2 %
HCT VFR BLD CALC: 33.1 %
HDLC SERPL-MCNC: 54 MG/DL
HGB BLD-MCNC: 10.2 G/DL
IMM GRANULOCYTES NFR BLD AUTO: 1.4 %
LDLC SERPL CALC-MCNC: 55 MG/DL
LYMPHOCYTES # BLD AUTO: 1.75 K/UL
LYMPHOCYTES NFR BLD AUTO: 30.2 %
MAN DIFF?: NORMAL
MCHC RBC-ENTMCNC: 30.8 GM/DL
MCHC RBC-ENTMCNC: 32.1 PG
MCV RBC AUTO: 104.1 FL
MONOCYTES # BLD AUTO: 0.61 K/UL
MONOCYTES NFR BLD AUTO: 10.5 %
NEUTROPHILS # BLD AUTO: 3.3 K/UL
NEUTROPHILS NFR BLD AUTO: 56.9 %
PLATELET # BLD AUTO: 290 K/UL
POTASSIUM SERPL-SCNC: 5.2 MMOL/L
PROT SERPL-MCNC: 6.3 G/DL
RBC # BLD: 3.18 M/UL
RBC # FLD: 19.1 %
SODIUM SERPL-SCNC: 140 MMOL/L
TRIGL SERPL-MCNC: 89 MG/DL
TSH SERPL-ACNC: 2.47 UIU/ML
WBC # FLD AUTO: 5.8 K/UL

## 2020-10-08 ENCOUNTER — APPOINTMENT (OUTPATIENT)
Dept: CARDIOLOGY | Facility: CLINIC | Age: 80
End: 2020-10-08
Payer: MEDICARE

## 2020-10-08 VITALS — RESPIRATION RATE: 16 BRPM | HEIGHT: 67 IN | WEIGHT: 200 LBS | BODY MASS INDEX: 31.39 KG/M2

## 2020-10-08 LAB
INR PPP: 2.7 RATIO
QUALITY CONTROL: YES

## 2020-10-08 PROCEDURE — 93793 ANTICOAG MGMT PT WARFARIN: CPT

## 2020-10-08 PROCEDURE — 85610 PROTHROMBIN TIME: CPT | Mod: QW

## 2020-10-10 ENCOUNTER — RX RENEWAL (OUTPATIENT)
Age: 80
End: 2020-10-10

## 2020-10-18 ENCOUNTER — OUTPATIENT (OUTPATIENT)
Dept: OUTPATIENT SERVICES | Facility: HOSPITAL | Age: 80
LOS: 1 days | End: 2020-10-18
Payer: MEDICARE

## 2020-10-18 DIAGNOSIS — Z98.890 OTHER SPECIFIED POSTPROCEDURAL STATES: Chronic | ICD-10-CM

## 2020-10-18 DIAGNOSIS — Z95.828 PRESENCE OF OTHER VASCULAR IMPLANTS AND GRAFTS: Chronic | ICD-10-CM

## 2020-10-18 DIAGNOSIS — Z11.59 ENCOUNTER FOR SCREENING FOR OTHER VIRAL DISEASES: ICD-10-CM

## 2020-10-18 DIAGNOSIS — Z98.89 OTHER SPECIFIED POSTPROCEDURAL STATES: Chronic | ICD-10-CM

## 2020-10-18 DIAGNOSIS — S72.001A FRACTURE OF UNSPECIFIED PART OF NECK OF RIGHT FEMUR, INITIAL ENCOUNTER FOR CLOSED FRACTURE: Chronic | ICD-10-CM

## 2020-10-18 DIAGNOSIS — H26.40 UNSPECIFIED SECONDARY CATARACT: Chronic | ICD-10-CM

## 2020-10-18 DIAGNOSIS — Z90.710 ACQUIRED ABSENCE OF BOTH CERVIX AND UTERUS: Chronic | ICD-10-CM

## 2020-10-18 LAB — SARS-COV-2 RNA SPEC QL NAA+PROBE: SIGNIFICANT CHANGE UP

## 2020-10-18 PROCEDURE — U0003: CPT

## 2020-10-19 ENCOUNTER — TRANSCRIPTION ENCOUNTER (OUTPATIENT)
Age: 80
End: 2020-10-19

## 2020-10-20 ENCOUNTER — OUTPATIENT (OUTPATIENT)
Dept: OUTPATIENT SERVICES | Facility: HOSPITAL | Age: 80
LOS: 1 days | End: 2020-10-20
Payer: MEDICARE

## 2020-10-20 ENCOUNTER — RESULT REVIEW (OUTPATIENT)
Age: 80
End: 2020-10-20

## 2020-10-20 DIAGNOSIS — Z90.710 ACQUIRED ABSENCE OF BOTH CERVIX AND UTERUS: Chronic | ICD-10-CM

## 2020-10-20 DIAGNOSIS — Z98.890 OTHER SPECIFIED POSTPROCEDURAL STATES: Chronic | ICD-10-CM

## 2020-10-20 DIAGNOSIS — Z98.89 OTHER SPECIFIED POSTPROCEDURAL STATES: Chronic | ICD-10-CM

## 2020-10-20 DIAGNOSIS — H26.40 UNSPECIFIED SECONDARY CATARACT: Chronic | ICD-10-CM

## 2020-10-20 DIAGNOSIS — Z95.828 PRESENCE OF OTHER VASCULAR IMPLANTS AND GRAFTS: Chronic | ICD-10-CM

## 2020-10-20 DIAGNOSIS — D48.1 NEOPLASM OF UNCERTAIN BEHAVIOR OF CONNECTIVE AND OTHER SOFT TISSUE: ICD-10-CM

## 2020-10-20 DIAGNOSIS — S72.001A FRACTURE OF UNSPECIFIED PART OF NECK OF RIGHT FEMUR, INITIAL ENCOUNTER FOR CLOSED FRACTURE: Chronic | ICD-10-CM

## 2020-10-20 PROCEDURE — 21930 EXC BACK LES SC < 3 CM: CPT

## 2020-10-20 PROCEDURE — 88304 TISSUE EXAM BY PATHOLOGIST: CPT | Mod: 26

## 2020-10-20 PROCEDURE — 88304 TISSUE EXAM BY PATHOLOGIST: CPT

## 2020-11-09 ENCOUNTER — APPOINTMENT (OUTPATIENT)
Dept: INTERNAL MEDICINE | Facility: CLINIC | Age: 80
End: 2020-11-09
Payer: MEDICARE

## 2020-11-09 ENCOUNTER — LABORATORY RESULT (OUTPATIENT)
Age: 80
End: 2020-11-09

## 2020-11-09 VITALS
RESPIRATION RATE: 14 BRPM | OXYGEN SATURATION: 98 % | HEART RATE: 70 BPM | DIASTOLIC BLOOD PRESSURE: 70 MMHG | TEMPERATURE: 97.2 F | SYSTOLIC BLOOD PRESSURE: 138 MMHG

## 2020-11-09 LAB
INR PPP: 3.3 RATIO
POCT-PROTHROMBIN TIME: 39.9 SECS
QUALITY CONTROL: YES

## 2020-11-09 PROCEDURE — 99214 OFFICE O/P EST MOD 30 MIN: CPT | Mod: 25

## 2020-11-09 PROCEDURE — 85610 PROTHROMBIN TIME: CPT | Mod: QW

## 2020-11-09 NOTE — HEALTH RISK ASSESSMENT
[No] : No [Two or more falls in past year] : Patient reported two or more falls in the past year [0] : 2) Feeling down, depressed, or hopeless: Not at all (0) [] : No [[id number]] : 0

## 2020-11-09 NOTE — PHYSICAL EXAM
[No Acute Distress] : no acute distress [Well Nourished] : well nourished [Well Developed] : well developed [Well-Appearing] : well-appearing [Normal Sclera/Conjunctiva] : normal sclera/conjunctiva [PERRL] : pupils equal round and reactive to light [EOMI] : extraocular movements intact [Normal Outer Ear/Nose] : the outer ears and nose were normal in appearance [No JVD] : no jugular venous distention [No Lymphadenopathy] : no lymphadenopathy [Supple] : supple [Thyroid Normal, No Nodules] : the thyroid was normal and there were no nodules present [No Respiratory Distress] : no respiratory distress  [No Accessory Muscle Use] : no accessory muscle use [Clear to Auscultation] : lungs were clear to auscultation bilaterally [Normal Rate] : normal rate  [Regular Rhythm] : with a regular rhythm [Normal S1, S2] : normal S1 and S2 [No Murmur] : no murmur heard [No Carotid Bruits] : no carotid bruits [No Abdominal Bruit] : a ~M bruit was not heard ~T in the abdomen [No Varicosities] : no varicosities [Pedal Pulses Present] : the pedal pulses are present [No Edema] : there was no peripheral edema [No Palpable Aorta] : no palpable aorta [No Extremity Clubbing/Cyanosis] : no extremity clubbing/cyanosis [Soft] : abdomen soft [Non Tender] : non-tender [Non-distended] : non-distended [No Masses] : no abdominal mass palpated [No HSM] : no HSM [Normal Bowel Sounds] : normal bowel sounds [Normal Posterior Cervical Nodes] : no posterior cervical lymphadenopathy [Normal Anterior Cervical Nodes] : no anterior cervical lymphadenopathy [No CVA Tenderness] : no CVA  tenderness [No Spinal Tenderness] : no spinal tenderness [No Joint Swelling] : no joint swelling [Grossly Normal Strength/Tone] : grossly normal strength/tone [No Rash] : no rash [Coordination Grossly Intact] : coordination grossly intact [No Focal Deficits] : no focal deficits [Normal Gait] : normal gait [Normal Affect] : the affect was normal [Normal Insight/Judgement] : insight and judgment were intact [Normal Voice/Communication] : normal voice/communication [Normal Supraclavicular Nodes] : no supraclavicular lymphadenopathy [Speech Grossly Normal] : speech grossly normal [Memory Grossly Normal] : memory grossly normal [Alert and Oriented x3] : oriented to person, place, and time [Normal Mood] : the mood was normal

## 2020-11-09 NOTE — HISTORY OF PRESENT ILLNESS
[Family Member] : family member [FreeTextEntry8] : MARTINEZ JONES is a 80 y.o. F presenting for mild right toe pain for 2 months. Pt denies any numbness or weakness.\par Pt saw dermatologist for biopsy and is pending results\par Pt saw her pain management and he recommend pt see Dr. Rodas thinking it maybe vascular.\par Pt reports there was a basal cell carcinoma discovered on her head last month.

## 2020-11-09 NOTE — END OF VISIT
[FreeTextEntry3] : "This note was written by Bacilio Vernon on 11/09/2020 acting solely as a scribe for Dr. Severiano Rodas MD.\par \par All medical record entries made by the Scribe were at my, Dr. Severiano Rodas MD., direction and personally dictated by me on 11/09/2020. I have personally reviewed the chart and agree that the record accurately reflects my personal performance of the history, physical exam, assessment and plan."\par

## 2020-11-10 LAB
ALBUMIN SERPL ELPH-MCNC: 4.6 G/DL
ALP BLD-CCNC: 81 U/L
ALT SERPL-CCNC: 7 U/L
ANION GAP SERPL CALC-SCNC: 11 MMOL/L
AST SERPL-CCNC: 10 U/L
BASOPHILS # BLD AUTO: 0 K/UL
BASOPHILS NFR BLD AUTO: 0 %
BILIRUB SERPL-MCNC: 0.3 MG/DL
BUN SERPL-MCNC: 21 MG/DL
CALCIUM SERPL-MCNC: 10 MG/DL
CHLORIDE SERPL-SCNC: 101 MMOL/L
CO2 SERPL-SCNC: 27 MMOL/L
CREAT SERPL-MCNC: 1.39 MG/DL
EOSINOPHIL # BLD AUTO: 0.34 K/UL
EOSINOPHIL NFR BLD AUTO: 4.3 %
ESTIMATED AVERAGE GLUCOSE: 103 MG/DL
GLUCOSE SERPL-MCNC: 90 MG/DL
HBA1C MFR BLD HPLC: 5.2 %
HCT VFR BLD CALC: 33.4 %
HGB BLD-MCNC: 10.1 G/DL
LYMPHOCYTES # BLD AUTO: 0.69 K/UL
LYMPHOCYTES NFR BLD AUTO: 8.6 %
MAN DIFF?: NORMAL
MCHC RBC-ENTMCNC: 30.2 GM/DL
MCHC RBC-ENTMCNC: 32 PG
MCV RBC AUTO: 105.7 FL
MONOCYTES # BLD AUTO: 0.27 K/UL
MONOCYTES NFR BLD AUTO: 3.4 %
NEUTROPHILS # BLD AUTO: 6.61 K/UL
NEUTROPHILS NFR BLD AUTO: 82.8 %
PLATELET # BLD AUTO: 230 K/UL
POTASSIUM SERPL-SCNC: 4.7 MMOL/L
PROT SERPL-MCNC: 6.4 G/DL
RBC # BLD: 3.16 M/UL
RBC # FLD: 20.1 %
SODIUM SERPL-SCNC: 139 MMOL/L
WBC # FLD AUTO: 7.98 K/UL

## 2020-11-17 ENCOUNTER — APPOINTMENT (OUTPATIENT)
Dept: CARDIOLOGY | Facility: CLINIC | Age: 80
End: 2020-11-17
Payer: MEDICARE

## 2020-11-17 ENCOUNTER — NON-APPOINTMENT (OUTPATIENT)
Age: 80
End: 2020-11-17

## 2020-11-17 VITALS
HEIGHT: 65 IN | BODY MASS INDEX: 33.49 KG/M2 | SYSTOLIC BLOOD PRESSURE: 120 MMHG | HEART RATE: 66 BPM | WEIGHT: 201 LBS | DIASTOLIC BLOOD PRESSURE: 74 MMHG

## 2020-11-17 VITALS — RESPIRATION RATE: 14 BRPM | HEIGHT: 65 IN | WEIGHT: 201 LBS | BODY MASS INDEX: 33.49 KG/M2

## 2020-11-17 LAB
INR PPP: 2.9 RATIO
QUALITY CONTROL: YES

## 2020-11-17 PROCEDURE — 99214 OFFICE O/P EST MOD 30 MIN: CPT

## 2020-11-17 PROCEDURE — 85610 PROTHROMBIN TIME: CPT | Mod: QW

## 2020-11-17 PROCEDURE — 93000 ELECTROCARDIOGRAM COMPLETE: CPT

## 2020-11-17 NOTE — REASON FOR VISIT
[FreeTextEntry1] : 79 year-old woman with a history of GI bleeding , atrial fibrillation  status post pulmonary vein isolation at [facility] not on anticoagulation, COPD, diabetes, IPF, granuloma annulari (resolves with steroids) palpations. An event monitor demonstrated that her symptoms correlate to PAF/PAT episodes and APCs.\par we started her on Coumadin but even before she was therapeutic she started having bouts of rectal bleeding. Her Coumadin was then discontinued.\par After speaking to her colorectal surgeon we resumed her AC. \par \par in July of 2016 she had a fall  with hip fracture. Her course was cough with treated by an SBO requiring an exlap. She had a  left leg DVT and PE. She had a IVC filter placed. She had a  spontaneous hematoma develop and was taken off of AC. She also had multiple transfusions. Her Sotalol was discontinued and she was put on Amiodarone in order to help maintain SR rhythm. \par \par She was discharged and then developed a right leg DVT in rehab on 10/2016. She had been restarted on anticoagulation. She reports already requiring 3 PRBC transfusions in rehab. \par \par She had right hand carpal tunnel surgery on 6/27/17 at  with resulting hematoma while on Lovenox bridge. \par \par Her ABPM showed an average BP of 132/72.\par  She was also diagnosed with hyperparathyroidism by Dr. JOEL Jasmine.

## 2020-11-17 NOTE — PHYSICAL EXAM
[General Appearance - Well Developed] : well developed [General Appearance - Well Nourished] : well nourished [Normal Conjunctiva] : the conjunctiva exhibited no abnormalities [Eyelids - No Xanthelasma] : the eyelids demonstrated no xanthelasmas [Normal Oral Mucosa] : normal oral mucosa [No Oral Pallor] : no oral pallor [No Oral Cyanosis] : no oral cyanosis [Normal Jugular Venous A Waves Present] : normal jugular venous A waves present [Normal Jugular Venous V Waves Present] : normal jugular venous V waves present [No Jugular Venous Keen A Waves] : no jugular venous keen A waves [Respiration, Rhythm And Depth] : normal respiratory rhythm and effort [Exaggerated Use Of Accessory Muscles For Inspiration] : no accessory muscle use [Auscultation Breath Sounds / Voice Sounds] : lungs were clear to auscultation bilaterally [Abdomen Soft] : soft [Abdomen Tenderness] : non-tender [Abdomen Mass (___ Cm)] : no abdominal mass palpated [Abnormal Walk] : normal gait [Nail Clubbing] : no clubbing of the fingernails [Cyanosis, Localized] : no localized cyanosis [Petechial Hemorrhages (___cm)] : no petechial hemorrhages [Skin Color & Pigmentation] : normal skin color and pigmentation [] : no rash [No Venous Stasis] : no venous stasis [Skin Lesions] : no skin lesions [Oriented To Time, Place, And Person] : oriented to person, place, and time [Affect] : the affect was normal [Mood] : the mood was normal [No Anxiety] : not feeling anxious [Normal Rate] : normal [Rhythm Regular] : regular [Normal S1] : normal S1 [Normal S2] : normal S2 [I] : a grade 1 [1+] : left 1+ [Right Carotid Bruit] : no bruit heard over the right carotid [Left Carotid Bruit] : no bruit heard over the left carotid [No Pitting Edema] : no pitting edema present [Rt] : varicose veins of the right leg noted [Lt] : varicose veins of the left leg noted

## 2020-11-17 NOTE — HISTORY OF PRESENT ILLNESS
[FreeTextEntry1] : \par She is now here for a followup visit.\par Since her last visit, she had been having a right big toe pain. She is having difficulty walking on it. She was recently diagnosed with a fungus via biopsy. She also had basal cell ca s/p Mohs surgery.  She is pending a visit with dermatology tomorrow.\par \par She had a lower extremity arterial doppler on 11/2020 at [facility] that showed minimal plaques with distal insufficiency worse on the left. She denies any claudication. \par \par She is still having significant bilateral hip pain which improved with aqua therapy. \par \par She is still complaining of intermittent lightheadedness which is happening when she changes positions especially after sitting for prolong periods of time. The symptoms last for about 1 minute. She has not had any syncope. She is still having intermittent short palpitations that are lasting only for a few seconds. This is unchanged as well. \par \par Her lower extremity edema has essentially resolved.  She  denies any chest pain, PND, orthopnea,  syncope  strokelike symptoms. She is compliant with her other medications. No reported melena, hematochezia or hematemesis.  \par  She is still getting Procrit.

## 2020-11-17 NOTE — DISCUSSION/SUMMARY
[FreeTextEntry1] : 79 year old woman with a history as listed above presents for a followup visit. \par \par Carolina recently has been having many dermatological issues. She is now dealing with a fungal infection in her right toe that is limiting her. She is planning on seeing the dermatologist tomorrow. She may need to take PO medication for this that may interact with the Amio. Will discuss her medications after seeing the dermatologist. \par \par Her lightheadedness is likely from mild orthostasis. She will try to get up slower. \par \par She feeling better off of the metoprolol.  Given that her event monitor was significant for symptomatic PACS and a run of PSVT (PAT).  She did not have any signs of heart block or symptomatic bradycardia during the monitoring period.   She is tolerating the cardizem 30mg q8. She will continue  Amiodarone 100mg Qday given how symptomatic she is from her PAF. \par \par She denies any anginal symptoms. Clinically she is not in decompensated heart failure. Her lower extremity edema has  resolved.  Her physical exam was essentially unrevealing for any significant cardiovascular findings. Her EKG is unchanged from previous. \par \par In 9/2020 her lipid profile was at goal. \par \par Her Hb has been stable since the last PRBC transfusion.  If her hemoglobin is still under 8 she should get a blood transfusion. She is now on Procrit. She will continue to followup with heme. \par \par She will have her INR checked in our Coumadin Clinic. Her goal INR is 2-3 for CVA risk reduction and VTE prevention. \par \par She will followup with pulmonary (Dr. Gallegos). her last PFTs showed a moderate reduction in diffusion in  10/2020 which is unchanged from previous. \par \par Her TSH was normal in 1/2020.  She needs this checked annually as well. She went to opthalmology recently and had a normal check. \par \par She will followup with me in 2 month. She will followup with you for all of her other medical needs. \par

## 2020-11-17 NOTE — REVIEW OF SYSTEMS
[Feeling Fatigued] : not feeling fatigued [see HPI] : see HPI [Shortness Of Breath] : no shortness of breath [Dyspnea on exertion] : not dyspnea during exertion [Chest  Pressure] : no chest pressure [Chest Pain] : no chest pain [Lower Ext Edema] : no extremity edema [Leg Claudication] : no intermittent leg claudication [Palpitations] : no palpitations [Change In The Stool] : change in stool [Joint Pain] : joint pain [Joint Stiffness] : joint stiffness [Dizziness] : dizziness [Under Stress] : not under stress [Negative] : Heme/Lymph

## 2020-11-23 ENCOUNTER — APPOINTMENT (OUTPATIENT)
Dept: VASCULAR SURGERY | Facility: CLINIC | Age: 80
End: 2020-11-23
Payer: MEDICARE

## 2020-11-23 VITALS
OXYGEN SATURATION: 95 % | BODY MASS INDEX: 33.32 KG/M2 | HEART RATE: 63 BPM | SYSTOLIC BLOOD PRESSURE: 137 MMHG | TEMPERATURE: 97.2 F | DIASTOLIC BLOOD PRESSURE: 73 MMHG | HEIGHT: 65 IN | WEIGHT: 200 LBS

## 2020-11-23 DIAGNOSIS — S91.109A UNSPECIFIED OPEN WOUND OF UNSPECIFIED TOE(S) W/OUT DAMAGE TO NAIL, INITIAL ENCOUNTER: ICD-10-CM

## 2020-11-23 PROCEDURE — 99203 OFFICE O/P NEW LOW 30 MIN: CPT

## 2020-11-23 NOTE — HISTORY OF PRESENT ILLNESS
[FreeTextEntry1] : 79 yo F with hx of chronic anemia and diet control DM that developed a small wound at the base of her R 1st digit weeks ago. She was evaluated by podiatry and eventually a dermatologist that performed a bx and was told it was a fungal infection. Wound used to hurt but not anymore.

## 2020-11-23 NOTE — ASSESSMENT
[FreeTextEntry1] : 79 yo F with R base of 1st toe fungal infection with macerated skin. No clinical evidence of PVD with palpable DP pulse and biphasic signals at dorsum of 1st toe as well as base of toe.

## 2020-11-23 NOTE — PHYSICAL EXAM
[1+] : right 1+ [2+] : right 2+ [Ankle Swelling (On Exam)] : present [Ankle Swelling Bilaterally] : bilaterally  [Ankle Swelling On The Right] : mild [Varicose Veins Of Lower Extremities] : not present [] : not present [Skin Ulcer] : ulcer [Skin Induration] : no induration [Alert] : alert [Oriented to Person] : oriented to person [Oriented to Place] : oriented to place [Oriented to Time] : oriented to time [Calm] : calm [FreeTextEntry1] : Palpable R DP [de-identified] : Base of 1st toe macerated wound 2 mm at toe flexure, pink base,no odor, non tender

## 2020-12-15 ENCOUNTER — APPOINTMENT (OUTPATIENT)
Dept: CARDIOLOGY | Facility: CLINIC | Age: 80
End: 2020-12-15
Payer: MEDICARE

## 2020-12-15 LAB
INR PPP: 3 RATIO
QUALITY CONTROL: YES

## 2020-12-15 PROCEDURE — 93793 ANTICOAG MGMT PT WARFARIN: CPT

## 2020-12-15 PROCEDURE — 85610 PROTHROMBIN TIME: CPT | Mod: QW

## 2020-12-23 PROBLEM — Z87.09 HISTORY OF ACUTE SINUSITIS: Status: RESOLVED | Noted: 2019-11-01 | Resolved: 2020-12-23

## 2020-12-23 PROBLEM — Z87.09 HISTORY OF ACUTE PHARYNGITIS: Status: RESOLVED | Noted: 2020-02-07 | Resolved: 2020-12-23

## 2020-12-23 PROBLEM — Z87.09 HISTORY OF SORE THROAT: Status: RESOLVED | Noted: 2020-02-07 | Resolved: 2020-12-23

## 2020-12-28 ENCOUNTER — EMERGENCY (EMERGENCY)
Facility: HOSPITAL | Age: 80
LOS: 1 days | Discharge: HOME | End: 2020-12-28
Attending: EMERGENCY MEDICINE | Admitting: EMERGENCY MEDICINE
Payer: MEDICARE

## 2020-12-28 ENCOUNTER — APPOINTMENT (OUTPATIENT)
Dept: INTERNAL MEDICINE | Facility: CLINIC | Age: 80
End: 2020-12-28
Payer: MEDICARE

## 2020-12-28 VITALS — SYSTOLIC BLOOD PRESSURE: 148 MMHG | DIASTOLIC BLOOD PRESSURE: 82 MMHG | HEART RATE: 68 BPM | RESPIRATION RATE: 18 BRPM

## 2020-12-28 VITALS
RESPIRATION RATE: 14 BRPM | WEIGHT: 200 LBS | OXYGEN SATURATION: 96 % | BODY MASS INDEX: 33.32 KG/M2 | TEMPERATURE: 97.8 F | SYSTOLIC BLOOD PRESSURE: 126 MMHG | DIASTOLIC BLOOD PRESSURE: 84 MMHG | HEART RATE: 72 BPM | HEIGHT: 65 IN

## 2020-12-28 VITALS
WEIGHT: 199.96 LBS | OXYGEN SATURATION: 98 % | RESPIRATION RATE: 17 BRPM | TEMPERATURE: 98 F | HEART RATE: 66 BPM | SYSTOLIC BLOOD PRESSURE: 153 MMHG | HEIGHT: 63 IN | DIASTOLIC BLOOD PRESSURE: 64 MMHG

## 2020-12-28 DIAGNOSIS — Z98.89 OTHER SPECIFIED POSTPROCEDURAL STATES: Chronic | ICD-10-CM
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
## 2020-12-28 DIAGNOSIS — S72.001A FRACTURE OF UNSPECIFIED PART OF NECK OF RIGHT FEMUR, INITIAL ENCOUNTER FOR CLOSED FRACTURE: Chronic | ICD-10-CM

## 2020-12-28 DIAGNOSIS — Z98.890 OTHER SPECIFIED POSTPROCEDURAL STATES: Chronic | ICD-10-CM

## 2020-12-28 DIAGNOSIS — Z95.828 PRESENCE OF OTHER VASCULAR IMPLANTS AND GRAFTS: Chronic | ICD-10-CM

## 2020-12-28 DIAGNOSIS — H26.40 UNSPECIFIED SECONDARY CATARACT: Chronic | ICD-10-CM

## 2020-12-28 DIAGNOSIS — Z90.710 ACQUIRED ABSENCE OF BOTH CERVIX AND UTERUS: Chronic | ICD-10-CM

## 2020-12-28 PROCEDURE — 73522 X-RAY EXAM HIPS BI 3-4 VIEWS: CPT

## 2020-12-28 PROCEDURE — 70450 CT HEAD/BRAIN W/O DYE: CPT | Mod: 26

## 2020-12-28 PROCEDURE — 72125 CT NECK SPINE W/O DYE: CPT | Mod: 26

## 2020-12-28 PROCEDURE — 99284 EMERGENCY DEPT VISIT MOD MDM: CPT

## 2020-12-28 PROCEDURE — 71045 X-RAY EXAM CHEST 1 VIEW: CPT | Mod: 26

## 2020-12-28 PROCEDURE — 99284 EMERGENCY DEPT VISIT MOD MDM: CPT | Mod: 25

## 2020-12-28 PROCEDURE — 71045 X-RAY EXAM CHEST 1 VIEW: CPT

## 2020-12-28 PROCEDURE — 70450 CT HEAD/BRAIN W/O DYE: CPT

## 2020-12-28 PROCEDURE — 99214 OFFICE O/P EST MOD 30 MIN: CPT | Mod: 25

## 2020-12-28 PROCEDURE — 72125 CT NECK SPINE W/O DYE: CPT

## 2020-12-28 PROCEDURE — 85610 PROTHROMBIN TIME: CPT | Mod: QW

## 2020-12-28 PROCEDURE — 73522 X-RAY EXAM HIPS BI 3-4 VIEWS: CPT | Mod: 26

## 2020-12-28 NOTE — ED PROVIDER NOTE - CARE PROVIDER_API CALL
Jarod Obando  ORTHOPAEDIC SURGERY  [address] [place] [address]  Phone: [phone]  Fax: [phone]  Follow Up Time:     Severiano Rodas  INTERNAL MEDICINE  [address] [place] [address]  Phone: [phone]  Fax: [phone]  Follow Up Time:

## 2020-12-28 NOTE — ED PROVIDER NOTE - PATIENT PORTAL LINK FT
You can access the FollowMyHealth Patient Portal offered by [facility] by registering at the following website: [email or web address]. By joining [employer]’s FollowMyHealth portal, you will also be able to view your health information using other applications (apps) compatible with our system.

## 2020-12-28 NOTE — ED ADULT NURSE NOTE - OBJECTIVE STATEMENT
pt to er states she feel today while she was getting out of the shower states she hit the back of her head denies loc no laceration denies fever denies sob

## 2020-12-28 NOTE — ED ADULT NURSE NOTE - PMH
COPD (chronic obstructive pulmonary disease)    DM (diabetes mellitus)    GIB (gastrointestinal bleeding)    Hematoma  Hematoma of Right wrist  Hiatal hernia    PAF (paroxysmal atrial fibrillation)  s/p ablation  Pericarditis    Shoulder fracture, right

## 2020-12-28 NOTE — ED ADULT TRIAGE NOTE - CHIEF COMPLAINT QUOTE
"On Saturday I lost my balance and fell backwards in the bathroom and my head on the back of the tub" (Pt on Coumadin)

## 2020-12-28 NOTE — ED PROVIDER NOTE - NSFOLLOWUPINSTRUCTIONS_ED_ALL_ED_FT
1) Follow-up with your Primary Medical Doctor or referred doctor. Call today / next business day for prompt follow-up.  2) Return to Emergency room for any worsening or persistent pain, weakness, fever, dizziness, unable to walk properly, falling at home, or any other concerning symptoms.  3) See attached instruction sheets for additional information, including information regarding signs and symptoms to look out for, reasons to seek immediate care and other important instructions.  4) Follow-up with orthopedics as needed

## 2020-12-28 NOTE — HEALTH RISK ASSESSMENT
[No] : In the past 12 months have you used drugs other than those required for medical reasons? No [Any fall with injury in past year] : Patient reported fall with injury in the past year [0] : 2) Feeling down, depressed, or hopeless: Not at all (0) [] : No [[id number]] : 0

## 2020-12-28 NOTE — ED ADULT NURSE NOTE - NSIMPLEMENTINTERV_GEN_ALL_ED
Implemented All Fall Risk Interventions:  [place] to call system. Call bell, personal items and telephone within reach. Instruct patient to call for assistance. Room bathroom lighting operational. Non-slip footwear when patient is off stretcher. Physically safe environment: no spills, clutter or unnecessary equipment. Stretcher in lowest position, wheels locked, appropriate side rails in place. Provide visual cue, wrist band, yellow gown, etc. Monitor gait and stability. Monitor for mental status changes and reorient to person, place, and time. Review medications for side effects contributing to fall risk. Reinforce activity limits and safety measures with patient and family.

## 2020-12-28 NOTE — ED PROVIDER NOTE - OBJECTIVE STATEMENT
79 yo F p/w slipped getting out of bathtub 2 days ago and hit back of head. Pt co mild get ha since fall. Pt also co mild bl hip pain. Pt able to ambulate but is a little unsteady due to pain in hips. No Dizziness. no visual changes. no v/d. no agg/allev factors. no other inj or co.

## 2020-12-28 NOTE — PHYSICAL EXAM
[No Acute Distress] : no acute distress [Well Nourished] : well nourished [Well Developed] : well developed [Well-Appearing] : well-appearing [Normal Sclera/Conjunctiva] : normal sclera/conjunctiva [PERRL] : pupils equal round and reactive to light [EOMI] : extraocular movements intact [Normal Outer Ear/Nose] : the outer ears and nose were normal in appearance [Normal Oropharynx] : the oropharynx was normal [No JVD] : no jugular venous distention [No Lymphadenopathy] : no lymphadenopathy [Supple] : supple [Thyroid Normal, No Nodules] : the thyroid was normal and there were no nodules present [No Respiratory Distress] : no respiratory distress  [No Accessory Muscle Use] : no accessory muscle use [Clear to Auscultation] : lungs were clear to auscultation bilaterally [Normal Rate] : normal rate  [Regular Rhythm] : with a regular rhythm [Normal S1, S2] : normal S1 and S2 [No Murmur] : no murmur heard [No Carotid Bruits] : no carotid bruits [No Abdominal Bruit] : a ~M bruit was not heard ~T in the abdomen [No Varicosities] : no varicosities [Pedal Pulses Present] : the pedal pulses are present [No Edema] : there was no peripheral edema [No Palpable Aorta] : no palpable aorta [No Extremity Clubbing/Cyanosis] : no extremity clubbing/cyanosis [Soft] : abdomen soft [Non Tender] : non-tender [Non-distended] : non-distended [No Masses] : no abdominal mass palpated [No HSM] : no HSM [Normal Bowel Sounds] : normal bowel sounds [Normal Supraclavicular Nodes] : no supraclavicular lymphadenopathy [Normal Posterior Cervical Nodes] : no posterior cervical lymphadenopathy [Normal Anterior Cervical Nodes] : no anterior cervical lymphadenopathy [No CVA Tenderness] : no CVA  tenderness [No Spinal Tenderness] : no spinal tenderness [No Joint Swelling] : no joint swelling [Grossly Normal Strength/Tone] : grossly normal strength/tone [No Rash] : no rash [Coordination Grossly Intact] : coordination grossly intact [No Focal Deficits] : no focal deficits [Speech Grossly Normal] : speech grossly normal [Memory Grossly Normal] : memory grossly normal [Normal Affect] : the affect was normal [Alert and Oriented x3] : oriented to person, place, and time [Normal Mood] : the mood was normal [Normal Insight/Judgement] : insight and judgment were intact [de-identified] : unsteady gait

## 2020-12-28 NOTE — ED PROVIDER NOTE - PROGRESS NOTE DETAILS
Dw Dr MEGAN Rodas - sent for CT due to recent fall on coumadin - INR 3 today in office Pt doing well - will take cane to go home with and will fu with PMD asap for prompt follow-up.  Discussed with Patient and/or Family regarding the X-ray findings.  Discussed the risks of occult / secondary fracture or ligamentous/tendon injury. Discussed the importance of close prompt follow-up with Orthopaedics for definitive workup and treatment.  Also discussed injury / neuro precautions.  Verbalization of understanding of all instructions was shown and an opportunity was given for questions.

## 2020-12-28 NOTE — ED PROVIDER NOTE - TOBACCO USE
-- DO NOT REPLY / DO NOT REPLY ALL --  -- Message is from the [facility]--    COVID-19 Universal Screening: Positive    General Patient Message      Reason for Call:  The caller is refaxing the paper work: please use the guide that she is giving you so that the form can be filled out correctly.    Because this is billing; it is becoming time sensitive.    Please call if you have questions.    Thank you,    Caller Information       Type Contact Phone    06/08/2020 09:50 AM Phone (Incoming) elham mobleyraisa [facility] [phone]          Alternative phone number: na    Turnaround time given to caller:   \"This message will be sent to [state Provider's name]. The clinical team will fulfill your request as soon as they review your message.\"    
Spoke with sam and stated we didn't receive any fax sam states she will fax it over  
Never smoker

## 2021-01-12 ENCOUNTER — NON-APPOINTMENT (OUTPATIENT)
Age: 81
End: 2021-01-12

## 2021-01-12 ENCOUNTER — APPOINTMENT (OUTPATIENT)
Dept: CARDIOLOGY | Facility: CLINIC | Age: 81
End: 2021-01-12
Payer: MEDICARE

## 2021-01-12 VITALS
WEIGHT: 200 LBS | SYSTOLIC BLOOD PRESSURE: 145 MMHG | HEART RATE: 68 BPM | HEIGHT: 65 IN | BODY MASS INDEX: 33.32 KG/M2 | DIASTOLIC BLOOD PRESSURE: 74 MMHG | OXYGEN SATURATION: 94 %

## 2021-01-12 VITALS — SYSTOLIC BLOOD PRESSURE: 110 MMHG | DIASTOLIC BLOOD PRESSURE: 60 MMHG

## 2021-01-12 LAB
INR PPP: 3 RATIO
QUALITY CONTROL: YES

## 2021-01-12 PROCEDURE — 99214 OFFICE O/P EST MOD 30 MIN: CPT

## 2021-01-12 PROCEDURE — 93000 ELECTROCARDIOGRAM COMPLETE: CPT

## 2021-01-12 PROCEDURE — 85610 PROTHROMBIN TIME: CPT | Mod: QW

## 2021-01-12 NOTE — PHYSICAL EXAM
[General Appearance - Well Developed] : well developed [General Appearance - Well Nourished] : well nourished [Normal Conjunctiva] : the conjunctiva exhibited no abnormalities [Eyelids - No Xanthelasma] : the eyelids demonstrated no xanthelasmas [Normal Oral Mucosa] : normal oral mucosa [No Oral Pallor] : no oral pallor [No Oral Cyanosis] : no oral cyanosis [Normal Jugular Venous A Waves Present] : normal jugular venous A waves present [Normal Jugular Venous V Waves Present] : normal jugular venous V waves present [No Jugular Venous Keen A Waves] : no jugular venous keen A waves [Respiration, Rhythm And Depth] : normal respiratory rhythm and effort [Exaggerated Use Of Accessory Muscles For Inspiration] : no accessory muscle use [Auscultation Breath Sounds / Voice Sounds] : lungs were clear to auscultation bilaterally [Abdomen Soft] : soft [Abdomen Tenderness] : non-tender [Abdomen Mass (___ Cm)] : no abdominal mass palpated [Abnormal Walk] : normal gait [Nail Clubbing] : no clubbing of the fingernails [Cyanosis, Localized] : no localized cyanosis [Petechial Hemorrhages (___cm)] : no petechial hemorrhages [Skin Color & Pigmentation] : normal skin color and pigmentation [] : no rash [No Venous Stasis] : no venous stasis [Skin Lesions] : no skin lesions [Oriented To Time, Place, And Person] : oriented to person, place, and time [Affect] : the affect was normal [Mood] : the mood was normal [No Anxiety] : not feeling anxious [Normal Rate] : normal [Rhythm Regular] : regular [Normal S1] : normal S1 [Normal S2] : normal S2 [I] : a grade 1 [1+] : left 1+ [No Pitting Edema] : no pitting edema present [Rt] : varicose veins of the right leg noted [Lt] : varicose veins of the left leg noted [Right Carotid Bruit] : no bruit heard over the right carotid [Left Carotid Bruit] : no bruit heard over the left carotid

## 2021-01-12 NOTE — CARDIOLOGY SUMMARY
[___] : [unfilled] [No Ischemia] : no Ischemia [None] : normal LV function [Normal] : normal LA size [Mild] : mild mitral regurgitation [___] : [unfilled]

## 2021-01-12 NOTE — REVIEW OF SYSTEMS
[see HPI] : see HPI [Change In The Stool] : change in stool [Joint Pain] : joint pain [Joint Stiffness] : joint stiffness [Dizziness] : dizziness [Negative] : Heme/Lymph [Feeling Fatigued] : not feeling fatigued [Shortness Of Breath] : no shortness of breath [Dyspnea on exertion] : not dyspnea during exertion [Chest  Pressure] : no chest pressure [Chest Pain] : no chest pain [Lower Ext Edema] : no extremity edema [Leg Claudication] : no intermittent leg claudication [Palpitations] : no palpitations [Under Stress] : not under stress

## 2021-01-12 NOTE — REASON FOR VISIT
[FreeTextEntry1] : 79 year-old woman with a history of GI bleeding , atrial fibrillation  status post pulmonary vein isolation at [place] not on anticoagulation, COPD, diabetes, IPF, granuloma annulari (resolves with steroids) palpations. An event monitor demonstrated that her symptoms correlate to PAF/PAT episodes and APCs.\par we started her on Coumadin but even before she was therapeutic she started having bouts of rectal bleeding. Her Coumadin was then discontinued.\par After speaking to her colorectal surgeon we resumed her AC. \par \par in July of 2016 she had a fall  with hip fracture. Her course was cough with treated by an SBO requiring an exlap. She had a  left leg DVT and PE. She had a IVC filter placed. She had a  spontaneous hematoma develop and was taken off of AC. She also had multiple transfusions. Her Sotalol was discontinued and she was put on Amiodarone in order to help maintain SR rhythm. \par \par She was discharged and then developed a right leg DVT in rehab on 10/2016. She had been restarted on anticoagulation. She reports already requiring 3 PRBC transfusions in rehab. \par \par She had right hand carpal tunnel surgery on 6/27/17 at  with resulting hematoma while on Lovenox bridge. \par \par Her ABPM showed an average BP of 132/72.\par  She was also diagnosed with hyperparathyroidism by Dr. JOEL Jasmine. \par \par She had a lower extremity arterial doppler on 11/2020 at [facility] that showed minimal plaques with distal insufficiency worse on the left. She denies any claudication.

## 2021-01-12 NOTE — DISCUSSION/SUMMARY
[FreeTextEntry1] : 80 year old woman with a history as listed above presents for a followup visit. \par \par Carolina is feeling well. She denies any anginal symptoms. Clinically she is euvolemic on exam. Her EKG did not reveal any significant ischemic changes. \par \par Her lightheadedness is likely from mild orthostasis. She will try to get up slower. \par \par She feeling better off of the metoprolol.  Given that her event monitor was significant for symptomatic PACS and a run of PSVT (PAT).  She did not have any signs of heart block or symptomatic bradycardia during the monitoring period.   She is tolerating the cardizem 30mg q8. She will continue  Amiodarone 100mg Qday given how symptomatic she is from her PAF. Though now she is having longer palpitations runs. i will send her a Ziopatch. \par \par She denies any anginal symptoms. Clinically she is not in decompensated heart failure. Her lower extremity edema has  resolved.  Her physical exam was essentially unrevealing for any significant cardiovascular findings. Her EKG is unchanged from previous. \par \par In 9/2020 her lipid profile was at goal. \par \par Her Hb has been stable since the last PRBC transfusion.  If her hemoglobin is still under 8 she should get a blood transfusion. She is now on Procrit. She will continue to followup with heme. \par \par She will have her INR checked in our Coumadin Clinic. Her goal INR is 2-3 for CVA risk reduction and VTE prevention. \par \par She will followup with pulmonary (Dr. Gallegos). her last PFTs showed a moderate reduction in diffusion in  10/2020 which is unchanged from previous. \par \par Her TSH was normal in 1/2020.  She needs this checked annually as well. She went to opthalmology recently and had a normal check. \par \par She will followup with me in 2-3 month. She will followup with you for all of her other medical needs. \par

## 2021-01-12 NOTE — HISTORY OF PRESENT ILLNESS
[FreeTextEntry1] : \par She is now here for a followup visit.\par Since her last visit, she is relatively feeling well. Her Toe pain has improved. She is off of antifungal therapy at this point. \par \par She is still having significant bilateral hip pain which had improved with aqua therapy. She had a mechanical fall about 2 weeks ago. She had head CT which was negative. \par \par She is still complaining of intermittent lightheadedness which is happening when she changes positions especially after sitting for prolong periods of time. The symptoms last for about 1 minute. She has not had any syncope.\par \par Recently she has been having a pounding sensation that had occurred a few times over the month and can last ~1 hour.  \par \par Her lower extremity edema has essentially resolved.  She  denies any chest pain, PND, orthopnea,  syncope  strokelike symptoms. She is compliant with her other medications. No reported melena, hematochezia or hematemesis.  \par  She is still getting Procrit.

## 2021-01-15 ENCOUNTER — APPOINTMENT (OUTPATIENT)
Dept: INTERNAL MEDICINE | Facility: CLINIC | Age: 81
End: 2021-01-15
Payer: MEDICARE

## 2021-01-15 DIAGNOSIS — E11.9 TYPE 2 DIABETES MELLITUS W/OUT COMPLICATIONS: ICD-10-CM

## 2021-01-15 DIAGNOSIS — R06.89 OTHER ABNORMALITIES OF BREATHING: ICD-10-CM

## 2021-01-15 PROCEDURE — 99213 OFFICE O/P EST LOW 20 MIN: CPT | Mod: 95

## 2021-01-15 NOTE — HISTORY OF PRESENT ILLNESS
[Home] : at home, [unfilled] , at the time of the visit. [Medical Office: ([facility])___] : at the medical office located in  [Verbal consent obtained from patient] : the patient, [unfilled] [Mild] : mild [___ Days ago] : [unfilled] days ago [Episodic] : episodic  [Improving] : improving [Congestion] : no congestion [Cough] : no cough [Sore Throat] : no sore throat [Wheezing] : no wheezing [Chills] : no chills [Anorexia] : no anorexia [Shortness Of Breath] : no shortness of breath [Earache] : no earache [Fatigue] : not fatigue [Headache] : no headache [Fever] : no fever [FreeTextEntry8] : feels better on Trelegy \par no SOB\par Glucoses good at home

## 2021-01-15 NOTE — PHYSICAL EXAM
[No Acute Distress] : no acute distress [Well Nourished] : well nourished [Well Developed] : well developed [Well-Appearing] : well-appearing [Normal Voice/Communication] : normal voice/communication [Speech Grossly Normal] : speech grossly normal [Memory Grossly Normal] : memory grossly normal [Normal Affect] : the affect was normal [Alert and Oriented x3] : oriented to person, place, and time [Normal Mood] : the mood was normal

## 2021-01-26 ENCOUNTER — APPOINTMENT (OUTPATIENT)
Dept: ORTHOPEDIC SURGERY | Facility: CLINIC | Age: 81
End: 2021-01-26
Payer: MEDICARE

## 2021-01-26 VITALS — TEMPERATURE: 96 F

## 2021-01-26 DIAGNOSIS — M87.052 IDIOPATHIC ASEPTIC NECROSIS OF LEFT FEMUR: ICD-10-CM

## 2021-01-26 PROCEDURE — 99214 OFFICE O/P EST MOD 30 MIN: CPT

## 2021-01-26 PROCEDURE — 73502 X-RAY EXAM HIP UNI 2-3 VIEWS: CPT | Mod: LT

## 2021-01-26 NOTE — DISCUSSION/SUMMARY
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  However the osteonecrosis involving the majority the femoral head precluding her from becoming a good candidate for core decompression surgery.  Options of surgical intervention include total hip arthroplasty.  We also discussed the option of monitoring and decreasing weightbearing on the side. An extensive discussion was conducted on the natural history of the disease and the variety of surgical and non-surgical options available to the patient including, but not limited to non-steroidal anti-inflammatory medications, steroid injections, physical therapy, maintenance of ideal body weight, and reduction of activity.  Continue tramadol.  Physical therapy prescribed.  Suggested to use a walker. The patient will schedule an appointment as needed.\par

## 2021-01-26 NOTE — HISTORY OF PRESENT ILLNESS
[de-identified] : This is very nice 80-year-old female experiencing 11 days of acute onset of left hip pain, which is severe in intensity. The pain substantially limits activities of daily living. Walking tolerance is reduced.  She has a history of osteonecrosis of the right femoral head.  Now she has left hip pain.  She takes tramadol which does not help.  She is not tried physical therapy for this.  She does not try to cane or walker.  The patient denies any radiation of the pain to the feet and it is not associated with numbness, tingling, or weakness.  She has had multiple complications with surgery before including history of DVT and is chronically on Coumadin.

## 2021-01-26 NOTE — PHYSICAL EXAM
[de-identified] : Patient is well nourished, well-developed, in no acute distress, with appropriate mood and affect. The patient is oriented to time, place, and person. Respirations are even and unlabored. Gait evaluation reveals a limp. There is no inguinal adenopathy. Examination of the contralateral hip shows normal range of motion, strength, no tenderness, and intact skin. The affected limb is well-perfused, shows a grossly normal motor and sensory examination. Examination of the hip shows no skin lesions. Hip motion is reduced and causes pain. FADIR is positive and FRANCO is positive. Stinchfield test is positive. Leg lengths are approximately equal. Both hips are stable and muscle strength is normal. Pedal pulses are palpable. [de-identified] : AP and lateral x-rays of the left hip, pelvis, and femur were ordered and taken in the office and demonstrate no evidence of degenerative joint disease of the hip with maintained joint space and no evidence of fractures or other intraarticular pathology.\par \par The patient brings with her an MRI of the left hip.  I reviewed the MR imaging with the patient was demonstrates left femoral head osteonecrosis involving the majority the femoral head without evidence of collapse.  No evidence of fracture or dislocation.

## 2021-02-09 ENCOUNTER — APPOINTMENT (OUTPATIENT)
Dept: CARDIOLOGY | Facility: CLINIC | Age: 81
End: 2021-02-09
Payer: MEDICARE

## 2021-02-09 VITALS — HEIGHT: 65 IN | BODY MASS INDEX: 33.32 KG/M2 | WEIGHT: 200 LBS | RESPIRATION RATE: 16 BRPM

## 2021-02-09 LAB
INR PPP: 3 RATIO
QUALITY CONTROL: YES

## 2021-02-09 PROCEDURE — 85610 PROTHROMBIN TIME: CPT | Mod: QW

## 2021-02-09 PROCEDURE — 93793 ANTICOAG MGMT PT WARFARIN: CPT

## 2021-03-09 ENCOUNTER — APPOINTMENT (OUTPATIENT)
Dept: CARDIOLOGY | Facility: CLINIC | Age: 81
End: 2021-03-09
Payer: MEDICARE

## 2021-03-09 LAB
INR PPP: 3.6 RATIO
QUALITY CONTROL: YES

## 2021-03-09 PROCEDURE — 85610 PROTHROMBIN TIME: CPT | Mod: QW

## 2021-03-09 PROCEDURE — 93793 ANTICOAG MGMT PT WARFARIN: CPT

## 2021-03-16 ENCOUNTER — APPOINTMENT (OUTPATIENT)
Dept: CARDIOLOGY | Facility: CLINIC | Age: 81
End: 2021-03-16
Payer: MEDICARE

## 2021-03-16 ENCOUNTER — NON-APPOINTMENT (OUTPATIENT)
Age: 81
End: 2021-03-16

## 2021-03-16 VITALS
DIASTOLIC BLOOD PRESSURE: 72 MMHG | HEIGHT: 65 IN | SYSTOLIC BLOOD PRESSURE: 136 MMHG | BODY MASS INDEX: 33.99 KG/M2 | WEIGHT: 204 LBS | OXYGEN SATURATION: 98 % | HEART RATE: 59 BPM

## 2021-03-16 LAB
INR PPP: 3.1 RATIO
QUALITY CONTROL: YES

## 2021-03-16 PROCEDURE — 99214 OFFICE O/P EST MOD 30 MIN: CPT

## 2021-03-16 PROCEDURE — 85610 PROTHROMBIN TIME: CPT | Mod: QW

## 2021-03-16 PROCEDURE — 93000 ELECTROCARDIOGRAM COMPLETE: CPT

## 2021-03-16 NOTE — DISCUSSION/SUMMARY
[FreeTextEntry1] : 80 year old woman with a history as listed above presents for a followup visit. \par \par Carolina is feeling well. She denies any anginal symptoms. Clinically she is euvolemic on exam. Her EKG did not reveal any significant ischemic changes. \par \par Her lightheadedness is likely from mild orthostasis. She will try to get up slower. \par \par She feeling better off of the metoprolol.  She had a Ziopatch in 2/2021 that showed symptomatic PACs and PAT episodes. There was no AF seen. She did not have any signs of heart block or symptomatic bradycardia during the monitoring period.   She is tolerating the cardizem 30mg q8. She will continue  Amiodarone 100mg Qday given how symptomatic she is from her PAF previously. Repeat echo. \par \par She denies any anginal symptoms. Clinically she is not in decompensated heart failure. Her lower extremity edema has  resolved.  Her physical exam was essentially unrevealing for any significant cardiovascular findings. Her EKG is unchanged from previous. \par \par In 9/2020 her lipid profile was at goal. \par \par Her Hb has been stable since the last PRBC transfusion.  If her hemoglobin is still under 8 she should get a blood transfusion. She is now on Procrit. She will continue to followup with heme. \par \par She will have her INR checked in our Coumadin Clinic. Her goal INR is 2-3 for CVA risk reduction and VTE prevention. \par \par She will followup with pulmonary (Dr. Gallegos). her last PFTs showed a moderate reduction in diffusion in  10/2020 which is unchanged from previous. \par \par Her TSH was normal in 9/2020.  She needs this checked annually as well. She went to opthalmology recently and had a normal check. \par \par She will followup with me in -3 month. She will followup with you for all of her other medical needs. \par

## 2021-03-16 NOTE — REASON FOR VISIT
[FreeTextEntry1] : 79 year-old woman with a history of GI bleeding , atrial fibrillation  status post pulmonary vein isolation at [place] not on anticoagulation, COPD, diabetes, IPF, granuloma annulari (resolves with steroids) palpations. An event monitor demonstrated that her symptoms correlate to PAF/PAT episodes and APCs.\par we started her on Coumadin but even before she was therapeutic she started having bouts of rectal bleeding. Her Coumadin was then discontinued.\par After speaking to her colorectal surgeon we resumed her AC. \par \par in July of 2016 she had a fall  with hip fracture. Her course was cough with treated by an SBO requiring an exlap. She had a  left leg DVT and PE. She had a IVC filter placed. She had a  spontaneous hematoma develop and was taken off of AC. She also had multiple transfusions. Her Sotalol was discontinued and she was put on Amiodarone in order to help maintain SR rhythm. \par \par She was discharged and then developed a right leg DVT in rehab on 10/2016. She had been restarted on anticoagulation. She reports already requiring 3 PRBC transfusions in rehab. \par \par She had right hand carpal tunnel surgery on 6/27/17 at  with resulting hematoma while on Lovenox bridge. \par \par Her ABPM showed an average BP of 132/72.\par  She was also diagnosed with hyperparathyroidism by Dr. JOEL Jasmine. \par \par She had a lower extremity arterial doppler on 11/2020 at [facility] that showed minimal plaques with distal insufficiency worse on the left. She denies any claudication.

## 2021-03-16 NOTE — HISTORY OF PRESENT ILLNESS
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 She wants to go back to aqua therapy. \par \par She is still complaining of intermittent lightheadedness which is happening when she changes positions especially after sitting for prolong periods of time. The symptoms last for about 1 minute. She has not had any syncope.\par \par She has been having a pounding sensation that had occurred a few times over the month which is unchanged from previous. \par \par Her lower extremity edema has essentially resolved.  She  denies any chest pain, PND, orthopnea,  syncope  strokelike symptoms. She is compliant with her other medications. No reported melena, hematochezia or hematemesis.   She is still getting Procrit.

## 2021-03-30 ENCOUNTER — APPOINTMENT (OUTPATIENT)
Dept: CARDIOLOGY | Facility: CLINIC | Age: 81
End: 2021-03-30
Payer: MEDICARE

## 2021-03-30 VITALS — HEIGHT: 65 IN | TEMPERATURE: 97.9 F | RESPIRATION RATE: 16 BRPM

## 2021-03-30 LAB
INR PPP: 3.2 RATIO
QUALITY CONTROL: YES

## 2021-03-30 PROCEDURE — 93793 ANTICOAG MGMT PT WARFARIN: CPT

## 2021-03-30 PROCEDURE — 85610 PROTHROMBIN TIME: CPT | Mod: QW

## 2021-04-20 ENCOUNTER — APPOINTMENT (OUTPATIENT)
Dept: CARDIOLOGY | Facility: CLINIC | Age: 81
End: 2021-04-20
Payer: MEDICARE

## 2021-04-20 LAB
INR PPP: 2.9 RATIO
QUALITY CONTROL: YES

## 2021-04-20 PROCEDURE — 93793 ANTICOAG MGMT PT WARFARIN: CPT

## 2021-04-20 PROCEDURE — 85610 PROTHROMBIN TIME: CPT | Mod: QW

## 2021-04-20 PROCEDURE — 93306 TTE W/DOPPLER COMPLETE: CPT

## 2021-04-21 ENCOUNTER — RX RENEWAL (OUTPATIENT)
Age: 81
End: 2021-04-21

## 2021-04-28 ENCOUNTER — APPOINTMENT (OUTPATIENT)
Dept: INTERNAL MEDICINE | Facility: CLINIC | Age: 81
End: 2021-04-28
Payer: MEDICARE

## 2021-04-28 VITALS
TEMPERATURE: 96.5 F | RESPIRATION RATE: 14 BRPM | SYSTOLIC BLOOD PRESSURE: 118 MMHG | HEART RATE: 62 BPM | DIASTOLIC BLOOD PRESSURE: 60 MMHG | OXYGEN SATURATION: 97 %

## 2021-04-28 DIAGNOSIS — M87.00 IDIOPATHIC ASEPTIC NECROSIS OF UNSPECIFIED BONE: ICD-10-CM

## 2021-04-28 DIAGNOSIS — M95.8 OTHER SPECIFIED ACQUIRED DEFORMITIES OF MUSCULOSKELETAL SYSTEM: ICD-10-CM

## 2021-04-28 PROCEDURE — 99214 OFFICE O/P EST MOD 30 MIN: CPT | Mod: 25

## 2021-04-28 PROCEDURE — 85610 PROTHROMBIN TIME: CPT | Mod: QW

## 2021-04-28 NOTE — END OF VISIT
[FreeTextEntry3] : "I, Steffen Rasmussen, personally scribed the services dictated to me by Dr. Severiano Rodas MD in this documentation on 04/28/2021 "\par \par "I Dr. Severiano Rodas MD, personally performed the services described in this documentation on 04/28/2021 for the patient as scribed by Steffen Rasmussen in my presence. I have reviewed and verified that all the information is accurate and true."

## 2021-04-28 NOTE — PLAN
[FreeTextEntry1] : INR 2.7\par continue same dose\par Return in 1 month\par Pt to follow up with allergist \par Obtain Xray of clavicle\par

## 2021-04-28 NOTE — HISTORY OF PRESENT ILLNESS
[Spouse] : spouse [FreeTextEntry1] : follow up \par bilateral hip pain and left arm pain [de-identified] : MARTINEZ JONES is a 80 year old F who presents today for follow up. Pt has immune deficiency disease, received Pneumovax. Pt had\par MRI Cervical has compression C6-C7. Complains of bilateral hip pain, has avascular necrosis of both hips. Poor surgical candidate. Also has left arm pain. Also here today for her INR. Also has enlarged right clavicle. \par

## 2021-04-28 NOTE — PHYSICAL EXAM
[No Acute Distress] : no acute distress [Well Nourished] : well nourished [Well Developed] : well developed [Well-Appearing] : well-appearing [Normal Voice/Communication] : normal voice/communication [Normal Sclera/Conjunctiva] : normal sclera/conjunctiva [PERRL] : pupils equal round and reactive to light [EOMI] : extraocular movements intact [Normal Outer Ear/Nose] : the outer ears and nose were normal in appearance [No JVD] : no jugular venous distention [No Lymphadenopathy] : no lymphadenopathy [Supple] : supple [Thyroid Normal, No Nodules] : the thyroid was normal and there were no nodules present [No Respiratory Distress] : no respiratory distress  [No Accessory Muscle Use] : no accessory muscle use [Clear to Auscultation] : lungs were clear to auscultation bilaterally [Normal Rate] : normal rate  [Regular Rhythm] : with a regular rhythm [Normal S1, S2] : normal S1 and S2 [No Murmur] : no murmur heard [No Carotid Bruits] : no carotid bruits [No Abdominal Bruit] : a ~M bruit was not heard ~T in the abdomen [No Varicosities] : no varicosities [Pedal Pulses Present] : the pedal pulses are present [No Edema] : there was no peripheral edema [No Palpable Aorta] : no palpable aorta [No Extremity Clubbing/Cyanosis] : no extremity clubbing/cyanosis [Soft] : abdomen soft [Non Tender] : non-tender [Non-distended] : non-distended [No Masses] : no abdominal mass palpated [No HSM] : no HSM [Normal Bowel Sounds] : normal bowel sounds [Normal Supraclavicular Nodes] : no supraclavicular lymphadenopathy [Normal Posterior Cervical Nodes] : no posterior cervical lymphadenopathy [Normal Anterior Cervical Nodes] : no anterior cervical lymphadenopathy [No CVA Tenderness] : no CVA  tenderness [No Spinal Tenderness] : no spinal tenderness [No Joint Swelling] : no joint swelling [Grossly Normal Strength/Tone] : grossly normal strength/tone [No Rash] : no rash [Coordination Grossly Intact] : coordination grossly intact [No Focal Deficits] : no focal deficits [Deep Tendon Reflexes (DTR)] : deep tendon reflexes were 2+ and symmetric [Speech Grossly Normal] : speech grossly normal [Memory Grossly Normal] : memory grossly normal [Normal Affect] : the affect was normal [Alert and Oriented x3] : oriented to person, place, and time [Normal Mood] : the mood was normal [Normal Insight/Judgement] : insight and judgment were intact [de-identified] : enlarged clavicle [de-identified] : e

## 2021-04-28 NOTE — HEALTH RISK ASSESSMENT
[No] : In the past 12 months have you used drugs other than those required for medical reasons? No [No falls in past year] : Patient reported no falls in the past year [Assistive Device] : Patient uses an assistive device [0] : 2) Feeling down, depressed, or hopeless: Not at all (0) [] : No [de-identified] : nupur

## 2021-05-18 ENCOUNTER — APPOINTMENT (OUTPATIENT)
Dept: CARDIOLOGY | Facility: CLINIC | Age: 81
End: 2021-05-18
Payer: MEDICARE

## 2021-05-18 VITALS — HEIGHT: 65 IN | OXYGEN SATURATION: 96 % | HEART RATE: 56 BPM

## 2021-05-18 LAB
INR PPP: 2.8 RATIO
QUALITY CONTROL: YES

## 2021-05-18 PROCEDURE — 93793 ANTICOAG MGMT PT WARFARIN: CPT

## 2021-05-18 PROCEDURE — 85610 PROTHROMBIN TIME: CPT | Mod: QW

## 2021-06-01 NOTE — ASU PREOP CHECKLIST - IDENTIFICATION BAND VERIFIED
done I've been having pain above my belly button going to both sides in the back for two weeks, I've been constipated also, I took MOM today and I went to the bathroom but it still hurts - patient I've been having pain above my belly button going to both sides in the back for two weeks, I've been constipated also, I took MOM today and I went to the bathroom but it still hurts - patient    Mom at bedside, patient took her routine Metoprolol 50 mg PO and Pepcid 40 mg PO while waiting in the ambulance bay for a room assignment

## 2021-06-10 ENCOUNTER — NON-APPOINTMENT (OUTPATIENT)
Age: 81
End: 2021-06-10

## 2021-06-11 NOTE — H&P PST ADULT - BSA (M2)
Patient: Leticia Valiente  Procedure(s):  TOTAL KNEE ARTHROPLASTY RIGHT (Right)  Anesthesia type: spinal    Patient location: PACU  Last vitals:   Vitals Value Taken Time   /76 9/14/2020 11:55 AM   Temp 37  C (98.6  F) 9/14/2020 11:55 AM   Pulse 72 9/14/2020 11:55 AM   Resp 20 9/14/2020 11:55 AM   SpO2 97 % 9/14/2020 11:55 AM     Post vital signs: stable  Level of consciousness: awake and responds to simple questions  Post-anesthesia pain: pain controlled  Post-anesthesia nausea and vomiting: no  Pulmonary: unassisted, return to baseline  Cardiovascular: stable and blood pressure at baseline  Hydration: adequate  Anesthetic events: no    QCDR Measures:  ASA# 11 - Jossie-op Cardiac Arrest: ASA11B - Patient did NOT experience unanticipated cardiac arrest  ASA# 12 - Jossie-op Mortality Rate: ASA12B - Patient did NOT die  ASA# 13 - PACU Re-Intubation Rate: ASA13B - Patient did NOT require a new airway mgmt  ASA# 10 - Composite Anes Safety: ASA10A - No serious adverse event    Additional Notes:  
1.98

## 2021-06-18 ENCOUNTER — APPOINTMENT (OUTPATIENT)
Dept: ORTHOPEDIC SURGERY | Facility: CLINIC | Age: 81
End: 2021-06-18

## 2021-06-25 ENCOUNTER — APPOINTMENT (OUTPATIENT)
Dept: ORTHOPEDIC SURGERY | Facility: CLINIC | Age: 81
End: 2021-06-25
Payer: MEDICARE

## 2021-06-25 VITALS
BODY MASS INDEX: 33.32 KG/M2 | DIASTOLIC BLOOD PRESSURE: 72 MMHG | OXYGEN SATURATION: 95 % | SYSTOLIC BLOOD PRESSURE: 132 MMHG | HEIGHT: 65 IN | HEART RATE: 68 BPM | WEIGHT: 200 LBS

## 2021-06-25 DIAGNOSIS — S43.201A UNSPECIFIED SUBLUXATION OF RIGHT STERNOCLAVICULAR JOINT, INITIAL ENCOUNTER: ICD-10-CM

## 2021-06-25 PROCEDURE — 73000 X-RAY EXAM OF COLLAR BONE: CPT | Mod: RT

## 2021-06-25 PROCEDURE — 99213 OFFICE O/P EST LOW 20 MIN: CPT

## 2021-06-25 NOTE — PHYSICAL EXAM
[FreeTextEntry1] : General: well nourished, in no acute distress, alert and oriented to person, place and time.\par Psychiatric: normal mood and affect, no abnormal movements or speech patterns.\par Eyes: vision intact without deficits, sclera and conjunctiva were normal, pupils were equal in size. \par ENT: Ears and nose were normal in appearance. No thyromegaly.\par Lymph: no enlarged nodes, no lymphedema in extremity.\par Respiratory: Normal respiratory rhythm and effort. No wheezing detected without auscultation. No shortness of breath or respiratory distress.\par Cardiac: no cardiac related leg swelling.\par Neurology: normal gross sensation in extremities to light touch.\par Abdomen: soft, non-tender, tympanic, no masses.\par \par RUE:\par \par Skin CDI. + Prominence over the medial clavicle at the sternoclavicular joint.  No surrounding erythema or ecchymosis.  No tenderness to palpation.\par M/U/R/AIN/PIN 5/5. M/U/R/Ax SILT. +2 Rad pulse. Compartments soft. \par No palpable masses or lymphedema.\par Full active shoulder range of motion without pain.

## 2021-06-25 NOTE — HISTORY OF PRESENT ILLNESS
[FreeTextEntry1] : This is a 80F w/ hx of COPD, DM, HTN, pulm fibrosis, PAD, afib (coumadin), s/p R hip IMN, who is presenting for evaluation of a right medial clavicle deformity which she first noticed 4 months ago.  She denies any trauma.  She denies any pain related to this.  She had a CT scan of her chest on 5/20/2021 which did not show any pathologic findings in this area.

## 2021-06-25 NOTE — DISCUSSION/SUMMARY
[de-identified] : This is a 80-year-old female with a right sternoclavicular subluxation.  I have explained his condition and its natural history including a persistent prominence over the medial clavicle.  No further surgical intervention is needed.  May follow-up on a as needed basis.

## 2021-06-25 NOTE — DATA REVIEWED
[de-identified] : 6/25/2021–right clavicle x-rays (2 views): there are no fractures, dislocations, or osseous lesions.

## 2021-06-30 ENCOUNTER — APPOINTMENT (OUTPATIENT)
Dept: CARDIOLOGY | Facility: CLINIC | Age: 81
End: 2021-06-30
Payer: MEDICARE

## 2021-06-30 ENCOUNTER — NON-APPOINTMENT (OUTPATIENT)
Age: 81
End: 2021-06-30

## 2021-06-30 VITALS
DIASTOLIC BLOOD PRESSURE: 68 MMHG | HEIGHT: 65 IN | SYSTOLIC BLOOD PRESSURE: 109 MMHG | HEART RATE: 64 BPM | BODY MASS INDEX: 33.32 KG/M2 | WEIGHT: 200 LBS | OXYGEN SATURATION: 95 %

## 2021-06-30 LAB
INR PPP: 2.4 RATIO
QUALITY CONTROL: YES

## 2021-06-30 PROCEDURE — 99214 OFFICE O/P EST MOD 30 MIN: CPT

## 2021-06-30 PROCEDURE — 85610 PROTHROMBIN TIME: CPT | Mod: QW

## 2021-06-30 PROCEDURE — 93000 ELECTROCARDIOGRAM COMPLETE: CPT

## 2021-06-30 NOTE — CARDIOLOGY SUMMARY
[___] : [unfilled] [No Ischemia] : no Ischemia [None] : normal LV function [Normal] : normal LA size [Mild] : mild mitral regurgitation [de-identified] : Sinus  Bradycardia \par -RSR(V1) -nondiagnostic.  [de-identified] : 2/2021: SR FORD [de-identified] : 4/2021 normal Lv function, mod LVH, noraml LV function, moderate diastolic dysfunction. PASP 55 (moderate PHTN)

## 2021-06-30 NOTE — HISTORY OF PRESENT ILLNESS
[FreeTextEntry1] : She is now here for a followup visit.\par Since her last visit, she saw an allergist and was told that she had immune deficiency which she is receiving treatment. \par She recently had some hematuria. LAWRENCE (Buddy). She now is pending a cystoscopy on 7/28. \par \par She is still complaining of intermittent lightheadedness which is happening when she changes positions especially after sitting for prolong periods of time. The symptoms last for about 1 minute. She has not had any syncope. It unchanged from previous. \par Her lower extremity edema has essentially resolved.  She  denies any chest pain, PND, orthopnea,  syncope  strokelike symptoms. She is compliant with her other medications. No reported melena, hematochezia or hematemesis.\par She is still doing pool therapy without any anginal symptoms. \par    She is still getting Procrit.

## 2021-06-30 NOTE — REASON FOR VISIT
[FreeTextEntry1] : 79 year-old woman with a history of GI bleeding , atrial fibrillation  status post pulmonary vein isolation at [facility] not on anticoagulation, COPD, diabetes, IPF, granuloma annulari (resolves with steroids) palpations. An event monitor demonstrated that her symptoms correlate to PAF/PAT episodes and APCs.\par we started her on Coumadin but even before she was therapeutic she started having bouts of rectal bleeding. Her Coumadin was then discontinued.\par After speaking to her colorectal surgeon we resumed her AC. \par \par in July of 2016 she had a fall  with hip fracture. Her course was cough with treated by an SBO requiring an exlap. She had a  left leg DVT and PE. She had a IVC filter placed. She had a  spontaneous hematoma develop and was taken off of AC. She also had multiple transfusions. Her Sotalol was discontinued and she was put on Amiodarone in order to help maintain SR rhythm. \par \par She was discharged and then developed a right leg DVT in rehab on 10/2016. She had been restarted on anticoagulation. She reports already requiring 3 PRBC transfusions in rehab. \par \par She had right hand carpal tunnel surgery on 6/27/17 at  with resulting hematoma while on Lovenox bridge. \par \par Her ABPM showed an average BP of 132/72.\par  She was also diagnosed with hyperparathyroidism by Dr. JOEL Jasmine. \par \par She had a lower extremity arterial doppler on 11/2020 at [facility] that showed minimal plaques with distal insufficiency worse on the left. She denies any claudication.

## 2021-06-30 NOTE — DISCUSSION/SUMMARY
[FreeTextEntry1] : 80 year old woman with a history as listed above presents for a followup visit. \par \par Carolina is feeling well.  She denies any anginal symptoms. Clinically she is not in decompensated heart failure. Her lower extremity edema has  resolved.  Her physical exam was essentially unrevealing for any significant cardiovascular findings. Her EKG is unchanged from previous. \par \par Her lightheadedness is likely from mild orthostasis. She will try to get up slower. She feeling better off of the metoprolol.  She had a Ziopatch in 2/2021 that showed symptomatic PACs and PAT episodes. There was no AF seen. She did not have any signs of heart block or symptomatic bradycardia during the monitoring period.   She is tolerating the cardizem 30mg q8. She will continue  Amiodarone 100mg Qday given how symptomatic she is from her PAF previously.  \par \par In 9/2020 her lipid profile was at goal. \par \par She will have her INR checked in our Coumadin Clinic. Her goal INR is 2-3 for CVA risk reduction and VTE prevention. \par She is having mild hematuria she will need cystoscopy. She is optimized from a cardiovascular standpoint ot proceed with planned low risk ambulatory necessary procedure. Routine hemodynamic monitoring suggested. If coumadin needs to be held she likely will need to be bridged. \par \par She will followup with pulmonary (Dr. Gallegos). her last PFTs showed a moderate reduction in diffusion in  10/2020 which is unchanged from previous. \par \par Her TSH was normal in 9/2020.  She needs this checked annually as well. She went to opthalmology and had a normal check in 2020. \par \par She will followup with me in 3 month. She will followup with you for all of her other medical needs. \par

## 2021-06-30 NOTE — PHYSICAL EXAM
[General Appearance - Well Developed] : well developed [General Appearance - Well Nourished] : well nourished [Normal Conjunctiva] : the conjunctiva exhibited no abnormalities [Eyelids - No Xanthelasma] : the eyelids demonstrated no xanthelasmas [Normal Oral Mucosa] : normal oral mucosa [No Oral Pallor] : no oral pallor [No Oral Cyanosis] : no oral cyanosis [Normal Jugular Venous A Waves Present] : normal jugular venous A waves present [Normal Jugular Venous V Waves Present] : normal jugular venous V waves present [No Jugular Venous Keen A Waves] : no jugular venous keen A waves [Respiration, Rhythm And Depth] : normal respiratory rhythm and effort [Exaggerated Use Of Accessory Muscles For Inspiration] : no accessory muscle use [Auscultation Breath Sounds / Voice Sounds] : lungs were clear to auscultation bilaterally [Abdomen Soft] : soft [Abdomen Tenderness] : non-tender [Abdomen Mass (___ Cm)] : no abdominal mass palpated [Abnormal Walk] : normal gait [Nail Clubbing] : no clubbing of the fingernails [Cyanosis, Localized] : no localized cyanosis [Petechial Hemorrhages (___cm)] : no petechial hemorrhages [Skin Color & Pigmentation] : normal skin color and pigmentation [] : no rash [No Venous Stasis] : no venous stasis [Skin Lesions] : no skin lesions [Oriented To Time, Place, And Person] : oriented to person, place, and time [Affect] : the affect was normal [No Anxiety] : not feeling anxious [Mood] : the mood was normal [Normal Rate] : normal [Rhythm Regular] : regular [Normal S1] : normal S1 [Normal S2] : normal S2 [I] : a grade 1 [1+] : left 1+ [No Pitting Edema] : no pitting edema present [Rt] : varicose veins of the right leg noted [Lt] : varicose veins of the left leg noted [Right Carotid Bruit] : no bruit heard over the right carotid [Left Carotid Bruit] : no bruit heard over the left carotid

## 2021-07-23 ENCOUNTER — RX RENEWAL (OUTPATIENT)
Age: 81
End: 2021-07-23

## 2021-07-28 ENCOUNTER — APPOINTMENT (OUTPATIENT)
Dept: COLORECTAL SURGERY | Facility: CLINIC | Age: 81
End: 2021-07-28
Payer: MEDICARE

## 2021-07-28 VITALS
RESPIRATION RATE: 14 BRPM | TEMPERATURE: 96.6 F | WEIGHT: 200 LBS | HEIGHT: 65 IN | BODY MASS INDEX: 33.32 KG/M2 | OXYGEN SATURATION: 95 % | DIASTOLIC BLOOD PRESSURE: 82 MMHG | SYSTOLIC BLOOD PRESSURE: 126 MMHG | HEART RATE: 64 BPM

## 2021-07-28 DIAGNOSIS — Z87.09 PERSONAL HISTORY OF OTHER DISEASES OF THE RESPIRATORY SYSTEM: ICD-10-CM

## 2021-07-28 DIAGNOSIS — K62.5 HEMORRHAGE OF ANUS AND RECTUM: ICD-10-CM

## 2021-07-28 DIAGNOSIS — Z82.3 FAMILY HISTORY OF STROKE: ICD-10-CM

## 2021-07-28 DIAGNOSIS — Z86.79 PERSONAL HISTORY OF OTHER DISEASES OF THE CIRCULATORY SYSTEM: ICD-10-CM

## 2021-07-28 DIAGNOSIS — Z87.19 PERSONAL HISTORY OF OTHER DISEASES OF THE DIGESTIVE SYSTEM: ICD-10-CM

## 2021-07-28 DIAGNOSIS — K64.8 OTHER HEMORRHOIDS: ICD-10-CM

## 2021-07-28 DIAGNOSIS — Z86.2 PERSONAL HISTORY OF DISEASES OF THE BLOOD AND BLOOD-FORMING ORGANS AND CERTAIN DISORDERS INVOLVING THE IMMUNE MECHANISM: ICD-10-CM

## 2021-07-28 DIAGNOSIS — Z85.828 PERSONAL HISTORY OF OTHER MALIGNANT NEOPLASM OF SKIN: ICD-10-CM

## 2021-07-28 PROCEDURE — 46600 DIAGNOSTIC ANOSCOPY SPX: CPT

## 2021-07-28 PROCEDURE — 99204 OFFICE O/P NEW MOD 45 MIN: CPT

## 2021-07-28 NOTE — ASSESSMENT
[FreeTextEntry1] : Ms. Pepper presents to the office with reports of an internal hemorrhoidal flare. The symptoms include burning/itching/bleeding after bowel movements. We discussed the fact that all individuals possess hemorrhoids, but they are not notable, except in their symptomatic state. The pathophysiology of hemorrhoidal flares were reviewed, including the contribution of straining, hard stools, diarrheal stools in precipitating the symptoms.  She was advised to use MiraLAX twice daily to alleviate her constipation.  Given her need for Coumadin, she is not a candidate for rubber band ligation to address bleeding hemorrhoids.  As such, we will address her hemorrhoidal issues with Anusol suppositories.  I suspect however, that once her bowel regimen is more normalized, she will have less of an issue with regards to her hemorrhoidal bleeding.  She understands and is agreeable with plan of care.\par

## 2021-07-28 NOTE — PHYSICAL EXAM
[Normal rectal exam] : exam was normal [Reduce Spontaneously] : a spontaneously reducible (grade II) [Skin Tags] : there were no residual hemorrhoidal skin tags seen [Normal] : was normal [None] : there was no rectal mass  [Gross Blood] : no gross blood [No Rash or Lesion] : No rash or lesion [Alert] : alert [Oriented to Person] : oriented to person [Oriented to Place] : oriented to place [Oriented to Time] : oriented to time [Calm] : calm [de-identified] : No apparent distress [de-identified] : Normocephalic atraumatic [de-identified] : Moving all extremities x4

## 2021-07-28 NOTE — HISTORY OF PRESENT ILLNESS
[FreeTextEntry1] : Ms. Pepper presents to the office for consultation regarding 5 episodes of rectal bleeding with blood noted on the toilet bowl and toilet paper. She notes constipation with occasional hemorrhoidal burning and itching.  She is notably on Coumadin.  Her last colonoscopy was 4/20/2018.

## 2021-07-29 ENCOUNTER — APPOINTMENT (OUTPATIENT)
Dept: CARDIOLOGY | Facility: CLINIC | Age: 81
End: 2021-07-29
Payer: MEDICARE

## 2021-07-29 VITALS — BODY MASS INDEX: 33.32 KG/M2 | RESPIRATION RATE: 15 BRPM | WEIGHT: 200 LBS | HEIGHT: 65 IN

## 2021-07-29 LAB
INR PPP: 2.7 RATIO
QUALITY CONTROL: YES

## 2021-07-29 PROCEDURE — 93793 ANTICOAG MGMT PT WARFARIN: CPT

## 2021-07-29 PROCEDURE — 85610 PROTHROMBIN TIME: CPT | Mod: QW

## 2021-08-23 ENCOUNTER — EMERGENCY (EMERGENCY)
Facility: HOSPITAL | Age: 81
LOS: 1 days | Discharge: HOME | End: 2021-08-23
Attending: EMERGENCY MEDICINE | Admitting: EMERGENCY MEDICINE
Payer: MEDICARE

## 2021-08-23 VITALS
RESPIRATION RATE: 18 BRPM | WEIGHT: 195.11 LBS | SYSTOLIC BLOOD PRESSURE: 134 MMHG | TEMPERATURE: 98 F | HEART RATE: 67 BPM | HEIGHT: 63 IN | OXYGEN SATURATION: 94 % | DIASTOLIC BLOOD PRESSURE: 61 MMHG

## 2021-08-23 VITALS
TEMPERATURE: 98 F | OXYGEN SATURATION: 96 % | HEART RATE: 75 BPM | SYSTOLIC BLOOD PRESSURE: 129 MMHG | RESPIRATION RATE: 18 BRPM | DIASTOLIC BLOOD PRESSURE: 66 MMHG

## 2021-08-23 DIAGNOSIS — Z98.890 OTHER SPECIFIED POSTPROCEDURAL STATES: Chronic | ICD-10-CM

## 2021-08-23 DIAGNOSIS — Z98.89 OTHER SPECIFIED POSTPROCEDURAL STATES: Chronic | ICD-10-CM

## 2021-08-23 DIAGNOSIS — Z90.710 ACQUIRED ABSENCE OF BOTH CERVIX AND UTERUS: Chronic | ICD-10-CM

## 2021-08-23 DIAGNOSIS — Z95.828 PRESENCE OF OTHER VASCULAR IMPLANTS AND GRAFTS: Chronic | ICD-10-CM

## 2021-08-23 DIAGNOSIS — S72.001A FRACTURE OF UNSPECIFIED PART OF NECK OF RIGHT FEMUR, INITIAL ENCOUNTER FOR CLOSED FRACTURE: Chronic | ICD-10-CM

## 2021-08-23 DIAGNOSIS — H26.40 UNSPECIFIED SECONDARY CATARACT: Chronic | ICD-10-CM

## 2021-08-23 PROCEDURE — 99283 EMERGENCY DEPT VISIT LOW MDM: CPT | Mod: 25

## 2021-08-23 PROCEDURE — 90471 IMMUNIZATION ADMIN: CPT

## 2021-08-23 PROCEDURE — 99283 EMERGENCY DEPT VISIT LOW MDM: CPT

## 2021-08-23 PROCEDURE — 90715 TDAP VACCINE 7 YRS/> IM: CPT

## 2021-08-23 RX ORDER — TETANUS TOXOID, REDUCED DIPHTHERIA TOXOID AND ACELLULAR PERTUSSIS VACCINE, ADSORBED 5; 2.5; 8; 8; 2.5 [IU]/.5ML; [IU]/.5ML; UG/.5ML; UG/.5ML; UG/.5ML
0.5 SUSPENSION INTRAMUSCULAR ONCE
Refills: 0 | Status: COMPLETED | OUTPATIENT
Start: 2021-08-23 | End: 2021-08-23

## 2021-08-23 RX ORDER — ACETAMINOPHEN 500 MG
650 TABLET ORAL ONCE
Refills: 0 | Status: COMPLETED | OUTPATIENT
Start: 2021-08-23 | End: 2021-08-23

## 2021-08-23 RX ADMIN — Medication 650 MILLIGRAM(S): at 22:43

## 2021-08-23 RX ADMIN — TETANUS TOXOID, REDUCED DIPHTHERIA TOXOID AND ACELLULAR PERTUSSIS VACCINE, ADSORBED 0.5 MILLILITER(S): 5; 2.5; 8; 8; 2.5 SUSPENSION INTRAMUSCULAR at 22:35

## 2021-08-23 NOTE — ED PROVIDER NOTE - PROGRESS NOTE DETAILS
surgicel applied with dsd, pt evie well with min discomfrot monit for additional bleeding. no further bleeding pt had mild discomfort tylenol given

## 2021-08-23 NOTE — ED ADULT NURSE NOTE - NSICDXPASTSURGICALHX_GEN_ALL_CORE_FT
PAST SURGICAL HISTORY:  After cataract bilateral eye cataract surgically removed    Closed right hip fracture rigth hip ORIF july 19 2016    S/P carpal tunnel release Right 2008 & 6/27/17    S/P foot surgery     S/P hysterectomy     S/P IVC filter nov 2016    S/P knee surgery

## 2021-08-23 NOTE — ED ADULT NURSE NOTE - NSICDXFAMILYHX_GEN_ALL_CORE_FT
FAMILY HISTORY:  Father  Still living? No  Family history of myocardial infarction, Age at diagnosis: Age Unknown    Mother  Still living? Unknown  Family history of bladder cancer, Age at diagnosis: Age Unknown

## 2021-08-23 NOTE — ED ADULT NURSE NOTE - NSICDXPASTMEDICALHX_GEN_ALL_CORE_FT
PAST MEDICAL HISTORY:  COPD (chronic obstructive pulmonary disease)     DM (diabetes mellitus)     GIB (gastrointestinal bleeding)     Hematoma Hematoma of Right wrist    Hiatal hernia     PAF (paroxysmal atrial fibrillation) s/p ablation    Pericarditis     Shoulder fracture, right

## 2021-08-23 NOTE — ED PROVIDER NOTE - CARE PROVIDER_API CALL
Hussain Tobar  PLASTIC SURGERY  [address] [place] [address]  Phone: [phone]  Fax: [phone]  Follow Up Time:

## 2021-08-23 NOTE — ED PROVIDER NOTE - OBJECTIVE STATEMENT
pt is a r hand dom f who was slicing an onion on a mandolin when she cut the distal portio of her r thumb. she has a defect of skin and could not stop the bleeding because she is on coumadin. she went to urgent care and there they cauterized it with silver nitrate but it continued to bleed so she was sent to er for eval  her pmd is dr danielle adam her cardiologist valentina landa she has seen dr kena navarro for hand issues in the past  she is covid vaccinated last dose in march and unsure when her last tdap was

## 2021-08-23 NOTE — ED PROVIDER NOTE - NSFOLLOWUPINSTRUCTIONS_ED_ALL_ED_FT
DO NOT REMOVE DRESSING   DO NOT WET OR WASH FOR 24 HOURS  AFTER 24 HOURS YOU MAY GENTLY REMOVE THE CURLEX GAUZE AND OVERDRESSINGS DO NOT PULL AT ANY GAUZE ADHERENT TO YOUR WOUND AND DO NOT PULL OFF THE SURGICEL   IF THE DRESSING FALLS OFF,  RE APPLY SURGICEL AS PROVIDED AS INSTRUCTED, APPLY NON ADHERENT GAUZE AND THEN BULKY GAUZE THEN ROLLER GAUZE  FOLLOW UP WITH HAND SURGERY ON CALL DR GRAHAM  MONITOR FOR INFECTION  IF ANY COMPLICATIONS RETURN TO EMERGENCY  TYLENOL FOR PAIN

## 2021-08-23 NOTE — ED ADULT NURSE NOTE - NSIMPLEMENTINTERV_GEN_ALL_ED
Implemented All Fall with Harm Risk Interventions:  Eads to call system. Call bell, personal items and telephone within reach. Instruct patient to call for assistance. Room bathroom lighting operational. Non-slip footwear when patient is off stretcher. Physically safe environment: no spills, clutter or unnecessary equipment. Stretcher in lowest position, wheels locked, appropriate side rails in place. Provide visual cue, wrist band, yellow gown, etc. Monitor gait and stability. Monitor for mental status changes and reorient to person, place, and time. Review medications for side effects contributing to fall risk. Reinforce activity limits and safety measures with patient and family. Provide visual clues: red socks.

## 2021-08-23 NOTE — ED PROVIDER NOTE - PATIENT PORTAL LINK FT
You can access the FollowMyHealth Patient Portal offered by [facility] by registering at the following website: [email or web address]. By joining [employer]s FollowMyHealth portal, you will also be able to view your health information using other applications (apps) compatible with our system.

## 2021-08-24 ENCOUNTER — TRANSCRIPTION ENCOUNTER (OUTPATIENT)
Age: 81
End: 2021-08-24

## 2021-08-26 ENCOUNTER — APPOINTMENT (OUTPATIENT)
Dept: CARDIOLOGY | Facility: CLINIC | Age: 81
End: 2021-08-26
Payer: MEDICARE

## 2021-08-26 VITALS — HEIGHT: 65 IN | RESPIRATION RATE: 16 BRPM | BODY MASS INDEX: 33.32 KG/M2 | WEIGHT: 200 LBS

## 2021-08-26 LAB
INR PPP: 2.4 RATIO
QUALITY CONTROL: YES

## 2021-08-26 PROCEDURE — 85610 PROTHROMBIN TIME: CPT | Mod: QW

## 2021-08-26 PROCEDURE — 93793 ANTICOAG MGMT PT WARFARIN: CPT

## 2021-09-23 ENCOUNTER — APPOINTMENT (OUTPATIENT)
Dept: CARDIOLOGY | Facility: CLINIC | Age: 81
End: 2021-09-23
Payer: MEDICARE

## 2021-09-23 VITALS — BODY MASS INDEX: 33.32 KG/M2 | RESPIRATION RATE: 17 BRPM | HEIGHT: 65 IN | WEIGHT: 200 LBS

## 2021-09-23 LAB
INR PPP: 2.5 RATIO
QUALITY CONTROL: YES

## 2021-09-23 PROCEDURE — 93793 ANTICOAG MGMT PT WARFARIN: CPT

## 2021-09-23 PROCEDURE — 85610 PROTHROMBIN TIME: CPT | Mod: QW

## 2021-09-28 ENCOUNTER — APPOINTMENT (OUTPATIENT)
Dept: INTERNAL MEDICINE | Facility: CLINIC | Age: 81
End: 2021-09-28
Payer: MEDICARE

## 2021-09-28 PROCEDURE — G0008: CPT

## 2021-09-28 PROCEDURE — 90662 IIV NO PRSV INCREASED AG IM: CPT

## 2021-09-30 ENCOUNTER — NON-APPOINTMENT (OUTPATIENT)
Age: 81
End: 2021-09-30

## 2021-09-30 ENCOUNTER — APPOINTMENT (OUTPATIENT)
Dept: CARDIOLOGY | Facility: CLINIC | Age: 81
End: 2021-09-30
Payer: MEDICARE

## 2021-09-30 VITALS
OXYGEN SATURATION: 97 % | WEIGHT: 196 LBS | HEART RATE: 69 BPM | BODY MASS INDEX: 32.65 KG/M2 | DIASTOLIC BLOOD PRESSURE: 74 MMHG | HEIGHT: 65 IN | SYSTOLIC BLOOD PRESSURE: 130 MMHG

## 2021-09-30 PROCEDURE — 93000 ELECTROCARDIOGRAM COMPLETE: CPT

## 2021-09-30 PROCEDURE — 99214 OFFICE O/P EST MOD 30 MIN: CPT

## 2021-09-30 NOTE — DISCUSSION/SUMMARY
[FreeTextEntry1] : 80 year old woman with a history as listed above presents for a followup visit. \par \par Carolina is feeling well.  She denies any anginal symptoms. Clinically she is not in decompensated heart failure. Her lower extremity edema has  resolved.  Her physical exam was essentially unrevealing for any significant cardiovascular findings. Her EKG is unchanged from previous. \par \par her major issue today is with hip pain. there seems to be no recourse for her severe hip arthritis. She will try Meloxicam sparingly (a few times a week PRN) to see if it provides some level of relief. She will watch for any signs of bleeding. \par \par Her lightheadedness is likely from mild orthostasis. She will try to get up slower. She feeling better off of the metoprolol.  She had a Ziopatch in 2/2021 that showed symptomatic PACs and PAT episodes. There was no AF seen. She did not have any signs of heart block or symptomatic bradycardia during the monitoring period.   She is tolerating the cardizem 30mg q8. She will continue  Amiodarone 100mg Qday given how symptomatic she is from her PAF previously.  \par \par In 9/2020 her lipid profile was at goal. \par \par She will have her INR checked in our Coumadin Clinic. Her goal INR is 2-3 for CVA risk reduction and VTE prevention. \par  \par \par She will followup with pulmonary (Dr. Gallegos). her last PFTs showed a moderate reduction in diffusion in  10/2020 which is unchanged from previous. \par \par Her TSH was normal in 9/2020.  She needs this checked annually as well. She went to opthalmology and had a normal check in 2020. \par \par She will followup with me in 3 month. She will followup with you for all of her other medical needs. \par

## 2021-09-30 NOTE — HISTORY OF PRESENT ILLNESS
[FreeTextEntry1] : She is now here for a followup visit.\par Since her last visit, she has been more stressed since her  was sent to [facility] with depression. He has been home about 1 week. She is more stressed. \par \par Her hip pain has significantly worsened. Her cortisone injection have only provided minimal relief. She feels that the melixocam cream helps. \par \par She is still complaining of intermittent lightheadedness which is happening when she changes positions especially after sitting for prolong periods of time. The symptoms last for about 1 minute. She has not had any syncope. It unchanged from previous. \par Her lower extremity edema has essentially resolved.  She  denies any chest pain, PND, orthopnea,  syncope  strokelike symptoms. She is compliant with her other medications. No reported melena, hematochezia or hematemesis.\par She is still doing pool therapy without any anginal symptoms. \par    She is still getting Procrit.

## 2021-09-30 NOTE — REASON FOR VISIT
[FreeTextEntry1] : 79 year-old woman with a history of GI bleeding , atrial fibrillation  status post pulmonary vein isolation at [place] not on anticoagulation, COPD, diabetes, IPF, granuloma annulari (resolves with steroids) palpations. An event monitor demonstrated that her symptoms correlate to PAF/PAT episodes and APCs.\par we started her on Coumadin but even before she was therapeutic she started having bouts of rectal bleeding. Her Coumadin was then discontinued.\par After speaking to her colorectal surgeon we resumed her AC. \par \par in July of 2016 she had a fall  with hip fracture. Her course was cough with treated by an SBO requiring an exlap. She had a  left leg DVT and PE. She had a IVC filter placed. She had a  spontaneous hematoma develop and was taken off of AC. She also had multiple transfusions. Her Sotalol was discontinued and she was put on Amiodarone in order to help maintain SR rhythm. \par \par She was discharged and then developed a right leg DVT in rehab on 10/2016. She had been restarted on anticoagulation. She reports already requiring 3 PRBC transfusions in rehab. \par \par She had right hand carpal tunnel surgery on 6/27/17 at  with resulting hematoma while on Lovenox bridge. \par \par Her ABPM showed an average BP of 132/72.\par  She was also diagnosed with hyperparathyroidism by Dr. JOEL Jasmine. \par \par She had a lower extremity arterial doppler on 11/2020 at [facility] that showed minimal plaques with distal insufficiency worse on the left. She denies any claudication.

## 2021-09-30 NOTE — CARDIOLOGY SUMMARY
[de-identified] : Sinus  Bradycardia \par -RSR(V1) -nondiagnostic.  [de-identified] : 2/2021: SR FORD [de-identified] : 4/2021 normal Lv function, mod LVH, noraml LV function, moderate diastolic dysfunction. PASP 55 (moderate PHTN)

## 2021-10-11 ENCOUNTER — RX RENEWAL (OUTPATIENT)
Age: 81
End: 2021-10-11

## 2021-10-21 ENCOUNTER — APPOINTMENT (OUTPATIENT)
Dept: CARDIOLOGY | Facility: CLINIC | Age: 81
End: 2021-10-21
Payer: MEDICARE

## 2021-10-21 VITALS — HEIGHT: 65 IN | BODY MASS INDEX: 32.65 KG/M2 | TEMPERATURE: 97.8 F | WEIGHT: 196 LBS

## 2021-10-21 LAB
INR PPP: 2.3 RATIO
QUALITY CONTROL: YES

## 2021-10-21 PROCEDURE — 93793 ANTICOAG MGMT PT WARFARIN: CPT

## 2021-10-21 PROCEDURE — 85610 PROTHROMBIN TIME: CPT | Mod: QW

## 2021-11-04 ENCOUNTER — TRANSCRIPTION ENCOUNTER (OUTPATIENT)
Age: 81
End: 2021-11-04

## 2021-11-18 ENCOUNTER — APPOINTMENT (OUTPATIENT)
Dept: CARDIOLOGY | Facility: CLINIC | Age: 81
End: 2021-11-18
Payer: MEDICARE

## 2021-11-18 VITALS — HEIGHT: 65 IN | RESPIRATION RATE: 16 BRPM | BODY MASS INDEX: 32.65 KG/M2 | WEIGHT: 196 LBS

## 2021-11-18 LAB
INR PPP: 2.3 RATIO
QUALITY CONTROL: YES

## 2021-11-18 PROCEDURE — 85610 PROTHROMBIN TIME: CPT | Mod: QW

## 2021-11-18 PROCEDURE — 93793 ANTICOAG MGMT PT WARFARIN: CPT

## 2021-12-21 ENCOUNTER — APPOINTMENT (OUTPATIENT)
Dept: CARDIOLOGY | Facility: CLINIC | Age: 81
End: 2021-12-21
Payer: MEDICARE

## 2021-12-21 VITALS — HEIGHT: 65 IN | OXYGEN SATURATION: 99 % | HEART RATE: 62 BPM

## 2021-12-21 LAB
INR PPP: 2 RATIO
QUALITY CONTROL: YES

## 2021-12-21 PROCEDURE — 85610 PROTHROMBIN TIME: CPT | Mod: QW

## 2021-12-21 PROCEDURE — 93793 ANTICOAG MGMT PT WARFARIN: CPT

## 2022-01-04 ENCOUNTER — APPOINTMENT (OUTPATIENT)
Dept: CARDIOLOGY | Facility: CLINIC | Age: 82
End: 2022-01-04
Payer: MEDICARE

## 2022-01-04 ENCOUNTER — NON-APPOINTMENT (OUTPATIENT)
Age: 82
End: 2022-01-04

## 2022-01-04 VITALS
DIASTOLIC BLOOD PRESSURE: 79 MMHG | BODY MASS INDEX: 33.32 KG/M2 | HEIGHT: 65 IN | WEIGHT: 200 LBS | HEART RATE: 65 BPM | SYSTOLIC BLOOD PRESSURE: 149 MMHG | OXYGEN SATURATION: 97 %

## 2022-01-04 PROCEDURE — 93000 ELECTROCARDIOGRAM COMPLETE: CPT

## 2022-01-04 PROCEDURE — 99214 OFFICE O/P EST MOD 30 MIN: CPT

## 2022-01-04 NOTE — OBJECTIVE
[History reviewed] : History reviewed. [Medications and Allergies reviewed] : Medications and allergies reviewed. Lidocaine patch, ceftin, percocet/Yes

## 2022-01-04 NOTE — DISCUSSION/SUMMARY
[FreeTextEntry1] : 80 year old woman with a history as listed above presents for a followup visit. \par \par Carolina is feeling well.  She denies any anginal symptoms. Clinically she is not in decompensated heart failure. Her lower extremity edema has  resolved.  Her physical exam was essentially unrevealing for any significant cardiovascular findings. Her EKG is unchanged from previous. \par \par her major issue today is with hip pain. there seems to be no recourse for her severe hip arthritis. She will try Meloxicam sparingly (a few times a week) to see if it provides some level of relief. She will watch for any signs of bleeding. \par \par Her lightheadedness was likely from mild orthostasis and has essentially resolved.  She will try to get up slower. She feeling better off of the metoprolol.  She had a Ziopatch in 2/2021 that showed symptomatic PACs and PAT episodes. There was no AF seen. She did not have any signs of heart block or symptomatic bradycardia during the monitoring period.   She is tolerating the cardizem 30mg q8. She will continue  Amiodarone 100mg Qday given how symptomatic she is from her PAF previously.  \par \par In 9/2020 her lipid profile was at goal. \par \par She will have her INR checked in our Coumadin Clinic. Her goal INR is 2-3 for CVA risk reduction and VTE prevention. \par  \par She will followup with pulmonary (Dr. Gallegos). her last PFTs showed a moderate reduction in diffusion in  10/2020 . If it continues to worsen we will need to consider stopping the Amio. \par \par Her TSH was normal in 9/2020.  She needs this checked annually as well. She went to opthalmology and had a normal check in 2020. \par \par She will followup with me in 3 month. She will followup with you for all of her other medical needs. \par

## 2022-01-04 NOTE — REASON FOR VISIT
[FreeTextEntry1] : 79 year-old woman with a history of GI bleeding , atrial fibrillation  status post pulmonary vein isolation at [place] not on anticoagulation, COPD, diabetes, IPF, granuloma annulari (resolves with steroids) palpations. An event monitor demonstrated that her symptoms correlate to PAF/PAT episodes and APCs.\par we started her on Coumadin but even before she was therapeutic she started having bouts of rectal bleeding. Her Coumadin was then discontinued.\par After speaking to her colorectal surgeon we resumed her AC. \par \par in July of 2016 she had a fall  with hip fracture. Her course was cough with treated by an SBO requiring an exlap. She had a  left leg DVT and PE. She had a IVC filter placed. She had a  spontaneous hematoma develop and was taken off of AC. She also had multiple transfusions. Her Sotalol was discontinued and she was put on Amiodarone in order to help maintain SR rhythm. \par \par She was discharged and then developed a right leg DVT in rehab on 10/2016. She had been restarted on anticoagulation. She reports already requiring 3 PRBC transfusions in rehab. \par \par She had right hand carpal tunnel surgery on 6/27/17 at  with resulting hematoma while on Lovenox bridge. \par \par Her ABPM showed an average BP of 132/72.\par  She was also diagnosed with hyperparathyroidism by Dr. JOEL Jasmine. \par \par She had a lower extremity arterial doppler on 11/2020 at [facility] that showed minimal plaques with distal insufficiency worse on the left. She denies any claudication.

## 2022-01-04 NOTE — CARDIOLOGY SUMMARY
[de-identified] : SR [de-identified] : 2/2021: SR FORD [de-identified] : 4/2021 normal Lv function, mod LVH, noraml LV function, moderate diastolic dysfunction. PASP 55 (moderate PHTN)

## 2022-01-18 ENCOUNTER — APPOINTMENT (OUTPATIENT)
Dept: CARDIOLOGY | Facility: CLINIC | Age: 82
End: 2022-01-18
Payer: MEDICARE

## 2022-01-18 VITALS — HEIGHT: 65 IN | HEART RATE: 70 BPM | OXYGEN SATURATION: 97 %

## 2022-01-18 LAB
INR PPP: 2.1 RATIO
QUALITY CONTROL: YES

## 2022-01-18 PROCEDURE — 93793 ANTICOAG MGMT PT WARFARIN: CPT

## 2022-01-18 PROCEDURE — 85610 PROTHROMBIN TIME: CPT | Mod: QW

## 2022-02-12 ENCOUNTER — OUTPATIENT (OUTPATIENT)
Dept: OUTPATIENT SERVICES | Facility: HOSPITAL | Age: 82
LOS: 1 days | End: 2022-02-12
Payer: MEDICARE

## 2022-02-12 VITALS
DIASTOLIC BLOOD PRESSURE: 71 MMHG | OXYGEN SATURATION: 91 % | TEMPERATURE: 100 F | WEIGHT: 195.99 LBS | HEART RATE: 73 BPM | SYSTOLIC BLOOD PRESSURE: 123 MMHG | RESPIRATION RATE: 18 BRPM | HEIGHT: 64 IN

## 2022-02-12 VITALS
HEART RATE: 70 BPM | DIASTOLIC BLOOD PRESSURE: 90 MMHG | TEMPERATURE: 99 F | RESPIRATION RATE: 18 BRPM | OXYGEN SATURATION: 93 % | SYSTOLIC BLOOD PRESSURE: 164 MMHG

## 2022-02-12 DIAGNOSIS — Z95.828 PRESENCE OF OTHER VASCULAR IMPLANTS AND GRAFTS: Chronic | ICD-10-CM

## 2022-02-12 DIAGNOSIS — D63.1 ANEMIA IN CHRONIC KIDNEY DISEASE: ICD-10-CM

## 2022-02-12 DIAGNOSIS — Z90.710 ACQUIRED ABSENCE OF BOTH CERVIX AND UTERUS: Chronic | ICD-10-CM

## 2022-02-12 DIAGNOSIS — Z98.89 OTHER SPECIFIED POSTPROCEDURAL STATES: Chronic | ICD-10-CM

## 2022-02-12 DIAGNOSIS — Z98.890 OTHER SPECIFIED POSTPROCEDURAL STATES: Chronic | ICD-10-CM

## 2022-02-12 DIAGNOSIS — S72.001A FRACTURE OF UNSPECIFIED PART OF NECK OF RIGHT FEMUR, INITIAL ENCOUNTER FOR CLOSED FRACTURE: Chronic | ICD-10-CM

## 2022-02-12 DIAGNOSIS — H26.40 UNSPECIFIED SECONDARY CATARACT: Chronic | ICD-10-CM

## 2022-02-12 LAB
HCT VFR BLD CALC: 24.1 % — LOW (ref 34.5–45)
HGB BLD-MCNC: 7.7 G/DL — LOW (ref 11.5–15.5)
MCHC RBC-ENTMCNC: 32 GM/DL — SIGNIFICANT CHANGE UP (ref 32–36)
MCHC RBC-ENTMCNC: 32.4 PG — SIGNIFICANT CHANGE UP (ref 27–34)
MCV RBC AUTO: 101.3 FL — HIGH (ref 80–100)
NRBC # BLD: 1 /100 WBCS — HIGH (ref 0–0)
PLATELET # BLD AUTO: 221 K/UL — SIGNIFICANT CHANGE UP (ref 150–400)
RBC # BLD: 2.38 M/UL — LOW (ref 3.8–5.2)
RBC # FLD: 20.4 % — HIGH (ref 10.3–14.5)
WBC # BLD: 8.52 K/UL — SIGNIFICANT CHANGE UP (ref 3.8–10.5)
WBC # FLD AUTO: 8.52 K/UL — SIGNIFICANT CHANGE UP (ref 3.8–10.5)

## 2022-02-12 PROCEDURE — 36415 COLL VENOUS BLD VENIPUNCTURE: CPT

## 2022-02-12 PROCEDURE — 36430 TRANSFUSION BLD/BLD COMPNT: CPT

## 2022-02-12 PROCEDURE — P9016: CPT

## 2022-02-12 PROCEDURE — 85027 COMPLETE CBC AUTOMATED: CPT

## 2022-02-12 PROCEDURE — 86900 BLOOD TYPING SEROLOGIC ABO: CPT

## 2022-02-12 PROCEDURE — 86901 BLOOD TYPING SEROLOGIC RH(D): CPT

## 2022-02-12 PROCEDURE — 86850 RBC ANTIBODY SCREEN: CPT

## 2022-02-12 PROCEDURE — 86923 COMPATIBILITY TEST ELECTRIC: CPT

## 2022-02-12 RX ORDER — ACETAMINOPHEN 500 MG
650 TABLET ORAL ONCE
Refills: 0 | Status: COMPLETED | OUTPATIENT
Start: 2022-02-12 | End: 2022-02-12

## 2022-02-12 RX ORDER — DIPHENHYDRAMINE HCL 50 MG
25 CAPSULE ORAL ONCE
Refills: 0 | Status: COMPLETED | OUTPATIENT
Start: 2022-02-12 | End: 2022-02-12

## 2022-02-12 RX ORDER — FAMOTIDINE 10 MG/ML
1 INJECTION INTRAVENOUS
Qty: 0 | Refills: 0 | DISCHARGE

## 2022-02-12 RX ORDER — TRAMADOL HYDROCHLORIDE 50 MG/1
1 TABLET ORAL
Qty: 0 | Refills: 0 | DISCHARGE

## 2022-02-12 RX ORDER — GABAPENTIN 400 MG/1
1 CAPSULE ORAL
Qty: 0 | Refills: 0 | DISCHARGE

## 2022-02-12 RX ORDER — WARFARIN SODIUM 2.5 MG/1
1 TABLET ORAL
Qty: 0 | Refills: 0 | DISCHARGE

## 2022-02-12 RX ORDER — ERYTHROPOIETIN 10000 [IU]/ML
0 INJECTION, SOLUTION INTRAVENOUS; SUBCUTANEOUS
Qty: 0 | Refills: 0 | DISCHARGE

## 2022-02-12 RX ADMIN — Medication 25 MILLIGRAM(S): at 12:05

## 2022-02-12 RX ADMIN — Medication 650 MILLIGRAM(S): at 12:04

## 2022-02-15 ENCOUNTER — APPOINTMENT (OUTPATIENT)
Dept: CARDIOLOGY | Facility: CLINIC | Age: 82
End: 2022-02-15
Payer: MEDICARE

## 2022-02-15 PROCEDURE — 93793 ANTICOAG MGMT PT WARFARIN: CPT

## 2022-02-15 PROCEDURE — 85610 PROTHROMBIN TIME: CPT | Mod: QW

## 2022-02-16 LAB
INR PPP: 2.5 RATIO
QUALITY CONTROL: YES

## 2022-03-09 ENCOUNTER — APPOINTMENT (OUTPATIENT)
Dept: CARDIOLOGY | Facility: CLINIC | Age: 82
End: 2022-03-09
Payer: MEDICARE

## 2022-03-09 ENCOUNTER — NON-APPOINTMENT (OUTPATIENT)
Age: 82
End: 2022-03-09

## 2022-03-09 VITALS
SYSTOLIC BLOOD PRESSURE: 126 MMHG | HEIGHT: 65 IN | BODY MASS INDEX: 34.32 KG/M2 | DIASTOLIC BLOOD PRESSURE: 74 MMHG | OXYGEN SATURATION: 95 % | WEIGHT: 206 LBS | HEART RATE: 71 BPM

## 2022-03-09 DIAGNOSIS — J84.112 IDIOPATHIC PULMONARY FIBROSIS: ICD-10-CM

## 2022-03-09 PROCEDURE — 93000 ELECTROCARDIOGRAM COMPLETE: CPT

## 2022-03-09 PROCEDURE — 99214 OFFICE O/P EST MOD 30 MIN: CPT

## 2022-03-09 RX ORDER — BUDESONIDE AND FORMOTEROL FUMARATE DIHYDRATE 160; 4.5 UG/1; UG/1
160-4.5 AEROSOL RESPIRATORY (INHALATION)
Qty: 31 | Refills: 0 | Status: DISCONTINUED | COMMUNITY
Start: 2020-06-01 | End: 2022-03-09

## 2022-03-13 NOTE — DISCUSSION/SUMMARY
[FreeTextEntry1] : 80 year old woman with a history as listed above presents for a followup visit. \par \par Carolina is has been having more issues. Now trying IVIG give her chronic sinus infections. Advised of cardiac side effects of IVIG (ie volume retention). \par \par   She denies any anginal symptoms. Clinically she is not in decompensated heart failure. Her lower extremity edema has  resolved.  Her physical exam was essentially unrevealing for any significant cardiovascular findings. Her EKG is unchanged from previous. \par \par her major issue today is with hip pain. there seems to be no recourse for her severe hip arthritis. She will try Meloxicam sparingly (a few times a week) to see if it provides some level of relief. She will watch for any signs of bleeding. \par \par Her lightheadedness was likely from mild orthostasis and has essentially resolved.  She will try to get up slower. She feeling better off of the metoprolol.  She had a Ziopatch in 2/2021 that showed symptomatic PACs and PAT episodes. There was no AF seen. She did not have any signs of heart block or symptomatic bradycardia during the monitoring period.   She is tolerating the cardizem 30mg q8. She will continue  Amiodarone 100mg Qday given how symptomatic she is from her PAF previously.  \par \par In 9/2020 her lipid profile was at goal. \par \par She will have her INR checked in our Coumadin Clinic. Her goal INR is 2-3 for CVA risk reduction and VTE prevention. \par  \par She will followup with pulmonary (Dr. Gallegos). her last PFTs showed a moderate reduction in diffusion in  10/2020 . If it continues to worsen we will need to consider stopping the Amio. \par \par Her TSH was normal in 9/2020.  She needs this checked annually as well. She went to opthalmology and had a normal check in 2020. \par \par She will followup with me in 3 month. She will followup with you for all of her other medical needs. \par \par  \par

## 2022-03-13 NOTE — HISTORY OF PRESENT ILLNESS
[FreeTextEntry1] : She is now here for a followup visit.\par Since her last visit she has noted worsening hip pain. She is now pending an MRI of her spine per her pain management. SHe is considering doing PT.  \par \par She is still complaining of intermittent lightheadedness which is happening when she changes positions especially after sitting for prolong periods of time. The symptoms last for about 1 minute. She has not had any syncope. It unchanged from previous. \par \par She was hospitalized for a day at [facility] due to anemia. HGB was down to 7.7, requiring 2 units PRBC.  She has had follow up with her hematologist (Dr Hari Parker) since then. Repeat H/H is stable. She did have some dizzy spells at the time, but has improved.\par She has been seen by immunologist (Dr Jose Alejandro Leyva)recently and diagnosed with an "immune deficiency disorder". The plan was to start IVIG infusions.\par \par Her lower extremity edema has essentially resolved.  She  denies any chest pain, PND, orthopnea,  syncope  strokelike symptoms. No reported melena, hematochezia or hematemesis. Intermittent palpitations\par \par She is compliant with her other medications. \par

## 2022-03-13 NOTE — CARDIOLOGY SUMMARY
[de-identified] : SR [de-identified] : 2/2021: SR FORD [de-identified] : 4/2021 normal Lv function, mod LVH, noraml LV function, moderate diastolic dysfunction. PASP 55 (moderate PHTN)

## 2022-03-15 ENCOUNTER — APPOINTMENT (OUTPATIENT)
Dept: CARDIOLOGY | Facility: CLINIC | Age: 82
End: 2022-03-15
Payer: MEDICARE

## 2022-03-15 VITALS — HEART RATE: 76 BPM | OXYGEN SATURATION: 96 % | HEIGHT: 65 IN

## 2022-03-15 LAB
INR PPP: 2.7 RATIO
QUALITY CONTROL: YES

## 2022-03-15 PROCEDURE — 85610 PROTHROMBIN TIME: CPT | Mod: QW

## 2022-03-15 PROCEDURE — 93793 ANTICOAG MGMT PT WARFARIN: CPT

## 2022-03-22 ENCOUNTER — NON-APPOINTMENT (OUTPATIENT)
Age: 82
End: 2022-03-22

## 2022-03-22 ENCOUNTER — APPOINTMENT (OUTPATIENT)
Dept: CARDIOLOGY | Facility: CLINIC | Age: 82
End: 2022-03-22
Payer: MEDICARE

## 2022-03-22 VITALS
WEIGHT: 205 LBS | HEART RATE: 76 BPM | SYSTOLIC BLOOD PRESSURE: 112 MMHG | DIASTOLIC BLOOD PRESSURE: 69 MMHG | BODY MASS INDEX: 34.16 KG/M2 | HEIGHT: 65 IN | OXYGEN SATURATION: 95 %

## 2022-03-22 PROCEDURE — 93000 ELECTROCARDIOGRAM COMPLETE: CPT

## 2022-03-22 PROCEDURE — 99214 OFFICE O/P EST MOD 30 MIN: CPT

## 2022-04-06 ENCOUNTER — RX RENEWAL (OUTPATIENT)
Age: 82
End: 2022-04-06

## 2022-04-11 ENCOUNTER — APPOINTMENT (OUTPATIENT)
Dept: INTERNAL MEDICINE | Facility: CLINIC | Age: 82
End: 2022-04-11
Payer: MEDICARE

## 2022-04-11 VITALS
HEART RATE: 71 BPM | BODY MASS INDEX: 34.16 KG/M2 | WEIGHT: 205 LBS | SYSTOLIC BLOOD PRESSURE: 118 MMHG | DIASTOLIC BLOOD PRESSURE: 70 MMHG | OXYGEN SATURATION: 96 % | TEMPERATURE: 97.3 F | HEIGHT: 65 IN | RESPIRATION RATE: 16 BRPM

## 2022-04-11 DIAGNOSIS — R60.0 LOCALIZED EDEMA: ICD-10-CM

## 2022-04-11 LAB
INR PPP: 3.3 RATIO
POCT-PROTHROMBIN TIME: 40.1 SECS
QUALITY CONTROL: YES

## 2022-04-11 PROCEDURE — 99214 OFFICE O/P EST MOD 30 MIN: CPT | Mod: 25

## 2022-04-11 PROCEDURE — 85610 PROTHROMBIN TIME: CPT | Mod: QW

## 2022-04-11 NOTE — PHYSICAL EXAM
[No Acute Distress] : no acute distress [Well Nourished] : well nourished [Well Developed] : well developed [Well-Appearing] : well-appearing [Normal Voice/Communication] : normal voice/communication [Normal Sclera/Conjunctiva] : normal sclera/conjunctiva [PERRL] : pupils equal round and reactive to light [EOMI] : extraocular movements intact [Normal Outer Ear/Nose] : the outer ears and nose were normal in appearance [Normal Oropharynx] : the oropharynx was normal [No JVD] : no jugular venous distention [No Lymphadenopathy] : no lymphadenopathy [Supple] : supple [Thyroid Normal, No Nodules] : the thyroid was normal and there were no nodules present [No Respiratory Distress] : no respiratory distress  [No Accessory Muscle Use] : no accessory muscle use [Clear to Auscultation] : lungs were clear to auscultation bilaterally [Normal Rate] : normal rate  [Regular Rhythm] : with a regular rhythm [Normal S1, S2] : normal S1 and S2 [No Murmur] : no murmur heard [No Carotid Bruits] : no carotid bruits [No Abdominal Bruit] : a ~M bruit was not heard ~T in the abdomen [No Varicosities] : no varicosities [Pedal Pulses Present] : the pedal pulses are present [No Palpable Aorta] : no palpable aorta [No Extremity Clubbing/Cyanosis] : no extremity clubbing/cyanosis [Soft] : abdomen soft [Non Tender] : non-tender [Non-distended] : non-distended [No Masses] : no abdominal mass palpated [No HSM] : no HSM [Normal Bowel Sounds] : normal bowel sounds [Normal Posterior Cervical Nodes] : no posterior cervical lymphadenopathy [Normal Anterior Cervical Nodes] : no anterior cervical lymphadenopathy [No CVA Tenderness] : no CVA  tenderness [No Spinal Tenderness] : no spinal tenderness [No Joint Swelling] : no joint swelling [Grossly Normal Strength/Tone] : grossly normal strength/tone [No Rash] : no rash [Coordination Grossly Intact] : coordination grossly intact [No Focal Deficits] : no focal deficits [Normal Gait] : normal gait [Deep Tendon Reflexes (DTR)] : deep tendon reflexes were 2+ and symmetric [Speech Grossly Normal] : speech grossly normal [Memory Grossly Normal] : memory grossly normal [Normal Affect] : the affect was normal [Alert and Oriented x3] : oriented to person, place, and time [Normal Mood] : the mood was normal [Normal Insight/Judgement] : insight and judgment were intact [de-identified] : left leg edema [de-identified] : tenderness left calf

## 2022-04-11 NOTE — HEALTH RISK ASSESSMENT
[Former] : Former [No] : In the past 12 months have you used drugs other than those required for medical reasons? No [No falls in past year] : Patient reported no falls in the past year [0] : 2) Feeling down, depressed, or hopeless: Not at all (0) [PHQ-2 Negative - No further assessment needed] : PHQ-2 Negative - No further assessment needed [YearQuit] : 1989 [[id number]] : 0

## 2022-04-11 NOTE — PLAN
[FreeTextEntry1] : Continue medications\par sent for Venous Doppler\par INR 3.3 hold tonight the resume standing dose\par repeat 1 week

## 2022-04-11 NOTE — HISTORY OF PRESENT ILLNESS
[FreeTextEntry8] : MARTINEZ JONES is a 81 year old F who presents today for left calf pain and swelling for 4 days on Warfarin

## 2022-04-12 ENCOUNTER — APPOINTMENT (OUTPATIENT)
Dept: CARDIOLOGY | Facility: CLINIC | Age: 82
End: 2022-04-12
Payer: MEDICARE

## 2022-04-12 LAB
INR PPP: 2.8 RATIO
QUALITY CONTROL: YES

## 2022-04-12 PROCEDURE — 85610 PROTHROMBIN TIME: CPT | Mod: QW

## 2022-04-12 PROCEDURE — 93793 ANTICOAG MGMT PT WARFARIN: CPT

## 2022-04-20 ENCOUNTER — INPATIENT (INPATIENT)
Facility: HOSPITAL | Age: 82
LOS: 7 days | Discharge: HOME | DRG: 190 | End: 2022-04-28
Attending: INTERNAL MEDICINE | Admitting: STUDENT IN AN ORGANIZED HEALTH CARE EDUCATION/TRAINING PROGRAM
Payer: MEDICARE

## 2022-04-20 VITALS
OXYGEN SATURATION: 98 % | RESPIRATION RATE: 30 BRPM | HEIGHT: 64 IN | SYSTOLIC BLOOD PRESSURE: 163 MMHG | DIASTOLIC BLOOD PRESSURE: 76 MMHG | HEART RATE: 98 BPM | WEIGHT: 190.04 LBS | TEMPERATURE: 103 F

## 2022-04-20 DIAGNOSIS — Z98.890 OTHER SPECIFIED POSTPROCEDURAL STATES: Chronic | ICD-10-CM

## 2022-04-20 DIAGNOSIS — Z29.9 ENCOUNTER FOR PROPHYLACTIC MEASURES, UNSPECIFIED: ICD-10-CM

## 2022-04-20 DIAGNOSIS — Z90.710 ACQUIRED ABSENCE OF BOTH CERVIX AND UTERUS: Chronic | ICD-10-CM

## 2022-04-20 DIAGNOSIS — Z98.89 OTHER SPECIFIED POSTPROCEDURAL STATES: Chronic | ICD-10-CM

## 2022-04-20 DIAGNOSIS — I48.91 UNSPECIFIED ATRIAL FIBRILLATION: ICD-10-CM

## 2022-04-20 DIAGNOSIS — I50.9 HEART FAILURE, UNSPECIFIED: ICD-10-CM

## 2022-04-20 DIAGNOSIS — S72.001A FRACTURE OF UNSPECIFIED PART OF NECK OF RIGHT FEMUR, INITIAL ENCOUNTER FOR CLOSED FRACTURE: Chronic | ICD-10-CM

## 2022-04-20 DIAGNOSIS — B34.8 OTHER VIRAL INFECTIONS OF UNSPECIFIED SITE: ICD-10-CM

## 2022-04-20 DIAGNOSIS — J44.1 CHRONIC OBSTRUCTIVE PULMONARY DISEASE WITH (ACUTE) EXACERBATION: ICD-10-CM

## 2022-04-20 DIAGNOSIS — N18.9 CHRONIC KIDNEY DISEASE, UNSPECIFIED: ICD-10-CM

## 2022-04-20 DIAGNOSIS — J18.9 PNEUMONIA, UNSPECIFIED ORGANISM: ICD-10-CM

## 2022-04-20 DIAGNOSIS — H26.40 UNSPECIFIED SECONDARY CATARACT: Chronic | ICD-10-CM

## 2022-04-20 DIAGNOSIS — D64.9 ANEMIA, UNSPECIFIED: ICD-10-CM

## 2022-04-20 DIAGNOSIS — Z95.828 PRESENCE OF OTHER VASCULAR IMPLANTS AND GRAFTS: Chronic | ICD-10-CM

## 2022-04-20 DIAGNOSIS — D61.818 OTHER PANCYTOPENIA: ICD-10-CM

## 2022-04-20 DIAGNOSIS — E11.9 TYPE 2 DIABETES MELLITUS WITHOUT COMPLICATIONS: ICD-10-CM

## 2022-04-20 LAB
ALBUMIN SERPL ELPH-MCNC: 3.6 G/DL — SIGNIFICANT CHANGE UP (ref 3.3–5)
ALP SERPL-CCNC: 75 U/L — SIGNIFICANT CHANGE UP (ref 40–120)
ALT FLD-CCNC: 16 U/L — SIGNIFICANT CHANGE UP (ref 12–78)
ANION GAP SERPL CALC-SCNC: 8 MMOL/L — SIGNIFICANT CHANGE UP (ref 5–17)
APTT BLD: 39.9 SEC — HIGH (ref 27.5–35.5)
AST SERPL-CCNC: 11 U/L — LOW (ref 15–37)
BASOPHILS # BLD AUTO: 0 K/UL — SIGNIFICANT CHANGE UP (ref 0–0.2)
BASOPHILS NFR BLD AUTO: 0 % — SIGNIFICANT CHANGE UP (ref 0–2)
BILIRUB SERPL-MCNC: 1 MG/DL — SIGNIFICANT CHANGE UP (ref 0.2–1.2)
BUN SERPL-MCNC: 25 MG/DL — HIGH (ref 7–23)
CALCIUM SERPL-MCNC: 9.3 MG/DL — SIGNIFICANT CHANGE UP (ref 8.5–10.1)
CHLORIDE SERPL-SCNC: 104 MMOL/L — SIGNIFICANT CHANGE UP (ref 96–108)
CO2 SERPL-SCNC: 24 MMOL/L — SIGNIFICANT CHANGE UP (ref 22–31)
CREAT SERPL-MCNC: 1.5 MG/DL — HIGH (ref 0.5–1.3)
EGFR: 35 ML/MIN/1.73M2 — LOW
EOSINOPHIL # BLD AUTO: 0.05 K/UL — SIGNIFICANT CHANGE UP (ref 0–0.5)
EOSINOPHIL NFR BLD AUTO: 2 % — SIGNIFICANT CHANGE UP (ref 0–6)
GLUCOSE SERPL-MCNC: 111 MG/DL — HIGH (ref 70–99)
HCT VFR BLD CALC: 26.2 % — LOW (ref 34.5–45)
HGB BLD-MCNC: 8.5 G/DL — LOW (ref 11.5–15.5)
HPIV3 RNA SPEC QL NAA+PROBE: DETECTED
INR BLD: 2.78 RATIO — HIGH (ref 0.88–1.16)
LACTATE SERPL-SCNC: 1 MMOL/L — SIGNIFICANT CHANGE UP (ref 0.7–2)
LYMPHOCYTES # BLD AUTO: 0.44 K/UL — LOW (ref 1–3.3)
LYMPHOCYTES # BLD AUTO: 19 % — SIGNIFICANT CHANGE UP (ref 13–44)
MCHC RBC-ENTMCNC: 32.4 GM/DL — SIGNIFICANT CHANGE UP (ref 32–36)
MCHC RBC-ENTMCNC: 32.4 PG — SIGNIFICANT CHANGE UP (ref 27–34)
MCV RBC AUTO: 100 FL — SIGNIFICANT CHANGE UP (ref 80–100)
MONOCYTES # BLD AUTO: 0.25 K/UL — SIGNIFICANT CHANGE UP (ref 0–0.9)
MONOCYTES NFR BLD AUTO: 11 % — SIGNIFICANT CHANGE UP (ref 2–14)
NEUTROPHILS # BLD AUTO: 1.37 K/UL — LOW (ref 1.8–7.4)
NEUTROPHILS NFR BLD AUTO: 53 % — SIGNIFICANT CHANGE UP (ref 43–77)
NRBC # BLD: SIGNIFICANT CHANGE UP /100 WBCS (ref 0–0)
PLATELET # BLD AUTO: 106 K/UL — LOW (ref 150–400)
POST UNIT NUMBER: SIGNIFICANT CHANGE UP
POTASSIUM SERPL-MCNC: 4.3 MMOL/L — SIGNIFICANT CHANGE UP (ref 3.5–5.3)
POTASSIUM SERPL-SCNC: 4.3 MMOL/L — SIGNIFICANT CHANGE UP (ref 3.5–5.3)
PROT SERPL-MCNC: 7.5 G/DL — SIGNIFICANT CHANGE UP (ref 6–8.3)
PROTHROM AB SERPL-ACNC: 32.9 SEC — HIGH (ref 10.5–13.4)
RAPID RVP RESULT: DETECTED
RBC # BLD: 2.62 M/UL — LOW (ref 3.8–5.2)
RBC # FLD: 22.7 % — HIGH (ref 10.3–14.5)
SARS-COV-2 RNA SPEC QL NAA+PROBE: SIGNIFICANT CHANGE UP
SODIUM SERPL-SCNC: 136 MMOL/L — SIGNIFICANT CHANGE UP (ref 135–145)
WBC # BLD: 2.29 K/UL — LOW (ref 3.8–10.5)
WBC # FLD AUTO: 2.29 K/UL — LOW (ref 3.8–10.5)

## 2022-04-20 PROCEDURE — 86078 PHYS BLOOD BANK SERV REACTJ: CPT

## 2022-04-20 PROCEDURE — 99285 EMERGENCY DEPT VISIT HI MDM: CPT | Mod: FS

## 2022-04-20 PROCEDURE — 71045 X-RAY EXAM CHEST 1 VIEW: CPT | Mod: 26

## 2022-04-20 PROCEDURE — 99223 1ST HOSP IP/OBS HIGH 75: CPT | Mod: GC

## 2022-04-20 RX ORDER — SODIUM CHLORIDE 9 MG/ML
1000 INJECTION, SOLUTION INTRAVENOUS
Refills: 0 | Status: DISCONTINUED | OUTPATIENT
Start: 2022-04-20 | End: 2022-04-28

## 2022-04-20 RX ORDER — BUDESONIDE AND FORMOTEROL FUMARATE DIHYDRATE 160; 4.5 UG/1; UG/1
2 AEROSOL RESPIRATORY (INHALATION)
Refills: 0 | Status: DISCONTINUED | OUTPATIENT
Start: 2022-04-20 | End: 2022-04-28

## 2022-04-20 RX ORDER — DEXTROSE 50 % IN WATER 50 %
15 SYRINGE (ML) INTRAVENOUS ONCE
Refills: 0 | Status: DISCONTINUED | OUTPATIENT
Start: 2022-04-20 | End: 2022-04-28

## 2022-04-20 RX ORDER — TIOTROPIUM BROMIDE 18 UG/1
1 CAPSULE ORAL; RESPIRATORY (INHALATION) DAILY
Refills: 0 | Status: DISCONTINUED | OUTPATIENT
Start: 2022-04-20 | End: 2022-04-28

## 2022-04-20 RX ORDER — SIMVASTATIN 20 MG/1
10 TABLET, FILM COATED ORAL AT BEDTIME
Refills: 0 | Status: DISCONTINUED | OUTPATIENT
Start: 2022-04-20 | End: 2022-04-28

## 2022-04-20 RX ORDER — AZITHROMYCIN 500 MG/1
500 TABLET, FILM COATED ORAL ONCE
Refills: 0 | Status: COMPLETED | OUTPATIENT
Start: 2022-04-20 | End: 2022-04-20

## 2022-04-20 RX ORDER — ACETAMINOPHEN 500 MG
975 TABLET ORAL ONCE
Refills: 0 | Status: COMPLETED | OUTPATIENT
Start: 2022-04-20 | End: 2022-04-20

## 2022-04-20 RX ORDER — GABAPENTIN 400 MG/1
400 CAPSULE ORAL THREE TIMES A DAY
Refills: 0 | Status: DISCONTINUED | OUTPATIENT
Start: 2022-04-20 | End: 2022-04-20

## 2022-04-20 RX ORDER — PANTOPRAZOLE SODIUM 20 MG/1
40 TABLET, DELAYED RELEASE ORAL
Refills: 0 | Status: DISCONTINUED | OUTPATIENT
Start: 2022-04-20 | End: 2022-04-28

## 2022-04-20 RX ORDER — INSULIN LISPRO 100/ML
VIAL (ML) SUBCUTANEOUS AT BEDTIME
Refills: 0 | Status: DISCONTINUED | OUTPATIENT
Start: 2022-04-20 | End: 2022-04-28

## 2022-04-20 RX ORDER — GLUCAGON INJECTION, SOLUTION 0.5 MG/.1ML
1 INJECTION, SOLUTION SUBCUTANEOUS ONCE
Refills: 0 | Status: DISCONTINUED | OUTPATIENT
Start: 2022-04-20 | End: 2022-04-28

## 2022-04-20 RX ORDER — AMIODARONE HYDROCHLORIDE 400 MG/1
0 TABLET ORAL
Qty: 0 | Refills: 0 | DISCHARGE

## 2022-04-20 RX ORDER — DILTIAZEM HCL 120 MG
30 CAPSULE, EXT RELEASE 24 HR ORAL
Refills: 0 | Status: DISCONTINUED | OUTPATIENT
Start: 2022-04-20 | End: 2022-04-28

## 2022-04-20 RX ORDER — DILTIAZEM HCL 120 MG
1 CAPSULE, EXT RELEASE 24 HR ORAL
Qty: 0 | Refills: 0 | DISCHARGE

## 2022-04-20 RX ORDER — MONTELUKAST 4 MG/1
10 TABLET, CHEWABLE ORAL DAILY
Refills: 0 | Status: DISCONTINUED | OUTPATIENT
Start: 2022-04-20 | End: 2022-04-28

## 2022-04-20 RX ORDER — AMIODARONE HYDROCHLORIDE 400 MG/1
100 TABLET ORAL DAILY
Refills: 0 | Status: DISCONTINUED | OUTPATIENT
Start: 2022-04-20 | End: 2022-04-28

## 2022-04-20 RX ORDER — CHOLECALCIFEROL (VITAMIN D3) 125 MCG
1000 CAPSULE ORAL
Qty: 0 | Refills: 0 | DISCHARGE

## 2022-04-20 RX ORDER — AZITHROMYCIN 500 MG/1
500 TABLET, FILM COATED ORAL EVERY 24 HOURS
Refills: 0 | Status: DISCONTINUED | OUTPATIENT
Start: 2022-04-21 | End: 2022-04-22

## 2022-04-20 RX ORDER — DEXTROSE 50 % IN WATER 50 %
25 SYRINGE (ML) INTRAVENOUS ONCE
Refills: 0 | Status: DISCONTINUED | OUTPATIENT
Start: 2022-04-20 | End: 2022-04-28

## 2022-04-20 RX ORDER — GABAPENTIN 400 MG/1
1 CAPSULE ORAL
Qty: 0 | Refills: 0 | DISCHARGE

## 2022-04-20 RX ORDER — FOLIC ACID 0.8 MG
1 TABLET ORAL DAILY
Refills: 0 | Status: DISCONTINUED | OUTPATIENT
Start: 2022-04-20 | End: 2022-04-28

## 2022-04-20 RX ORDER — CEFTRIAXONE 500 MG/1
INJECTION, POWDER, FOR SOLUTION INTRAMUSCULAR; INTRAVENOUS
Refills: 0 | Status: COMPLETED | OUTPATIENT
Start: 2022-04-20 | End: 2022-04-26

## 2022-04-20 RX ORDER — WARFARIN SODIUM 2.5 MG/1
2.5 TABLET ORAL ONCE
Refills: 0 | Status: COMPLETED | OUTPATIENT
Start: 2022-04-20 | End: 2022-04-20

## 2022-04-20 RX ORDER — DEXTROSE 50 % IN WATER 50 %
12.5 SYRINGE (ML) INTRAVENOUS ONCE
Refills: 0 | Status: DISCONTINUED | OUTPATIENT
Start: 2022-04-20 | End: 2022-04-28

## 2022-04-20 RX ORDER — IPRATROPIUM/ALBUTEROL SULFATE 18-103MCG
3 AEROSOL WITH ADAPTER (GRAM) INHALATION ONCE
Refills: 0 | Status: COMPLETED | OUTPATIENT
Start: 2022-04-20 | End: 2022-04-20

## 2022-04-20 RX ORDER — CEFTRIAXONE 500 MG/1
1000 INJECTION, POWDER, FOR SOLUTION INTRAMUSCULAR; INTRAVENOUS ONCE
Refills: 0 | Status: COMPLETED | OUTPATIENT
Start: 2022-04-20 | End: 2022-04-20

## 2022-04-20 RX ORDER — ACETAMINOPHEN 500 MG
650 TABLET ORAL EVERY 6 HOURS
Refills: 0 | Status: DISCONTINUED | OUTPATIENT
Start: 2022-04-20 | End: 2022-04-28

## 2022-04-20 RX ORDER — INSULIN LISPRO 100/ML
VIAL (ML) SUBCUTANEOUS
Refills: 0 | Status: DISCONTINUED | OUTPATIENT
Start: 2022-04-20 | End: 2022-04-28

## 2022-04-20 RX ORDER — TRAMADOL HYDROCHLORIDE 50 MG/1
50 TABLET ORAL
Refills: 0 | Status: DISCONTINUED | OUTPATIENT
Start: 2022-04-20 | End: 2022-04-27

## 2022-04-20 RX ORDER — AZITHROMYCIN 500 MG/1
TABLET, FILM COATED ORAL
Refills: 0 | Status: DISCONTINUED | OUTPATIENT
Start: 2022-04-20 | End: 2022-04-22

## 2022-04-20 RX ORDER — DENOSUMAB 60 MG/ML
0 INJECTION SUBCUTANEOUS
Qty: 0 | Refills: 0 | DISCHARGE

## 2022-04-20 RX ORDER — CEFTRIAXONE 500 MG/1
1000 INJECTION, POWDER, FOR SOLUTION INTRAMUSCULAR; INTRAVENOUS EVERY 24 HOURS
Refills: 0 | Status: COMPLETED | OUTPATIENT
Start: 2022-04-21 | End: 2022-04-25

## 2022-04-20 RX ORDER — PIPERACILLIN AND TAZOBACTAM 4; .5 G/20ML; G/20ML
3.38 INJECTION, POWDER, LYOPHILIZED, FOR SOLUTION INTRAVENOUS ONCE
Refills: 0 | Status: COMPLETED | OUTPATIENT
Start: 2022-04-20 | End: 2022-04-20

## 2022-04-20 RX ORDER — SODIUM CHLORIDE 9 MG/ML
1700 INJECTION INTRAMUSCULAR; INTRAVENOUS; SUBCUTANEOUS ONCE
Refills: 0 | Status: COMPLETED | OUTPATIENT
Start: 2022-04-20 | End: 2022-04-20

## 2022-04-20 RX ORDER — IPRATROPIUM/ALBUTEROL SULFATE 18-103MCG
3 AEROSOL WITH ADAPTER (GRAM) INHALATION EVERY 6 HOURS
Refills: 0 | Status: DISCONTINUED | OUTPATIENT
Start: 2022-04-20 | End: 2022-04-28

## 2022-04-20 RX ORDER — GABAPENTIN 400 MG/1
300 CAPSULE ORAL THREE TIMES A DAY
Refills: 0 | Status: DISCONTINUED | OUTPATIENT
Start: 2022-04-20 | End: 2022-04-28

## 2022-04-20 RX ADMIN — SODIUM CHLORIDE 1700 MILLILITER(S): 9 INJECTION INTRAMUSCULAR; INTRAVENOUS; SUBCUTANEOUS at 12:12

## 2022-04-20 RX ADMIN — AZITHROMYCIN 255 MILLIGRAM(S): 500 TABLET, FILM COATED ORAL at 22:09

## 2022-04-20 RX ADMIN — SIMVASTATIN 10 MILLIGRAM(S): 20 TABLET, FILM COATED ORAL at 22:10

## 2022-04-20 RX ADMIN — Medication 40 MILLIGRAM(S): at 20:27

## 2022-04-20 RX ADMIN — PIPERACILLIN AND TAZOBACTAM 200 GRAM(S): 4; .5 INJECTION, POWDER, LYOPHILIZED, FOR SOLUTION INTRAVENOUS at 12:11

## 2022-04-20 RX ADMIN — Medication 3 MILLILITER(S): at 12:12

## 2022-04-20 RX ADMIN — Medication 975 MILLIGRAM(S): at 18:17

## 2022-04-20 RX ADMIN — Medication 975 MILLIGRAM(S): at 13:12

## 2022-04-20 RX ADMIN — Medication 975 MILLIGRAM(S): at 12:11

## 2022-04-20 RX ADMIN — CEFTRIAXONE 100 MILLIGRAM(S): 500 INJECTION, POWDER, FOR SOLUTION INTRAMUSCULAR; INTRAVENOUS at 22:09

## 2022-04-20 RX ADMIN — SODIUM CHLORIDE 1700 MILLILITER(S): 9 INJECTION INTRAMUSCULAR; INTRAVENOUS; SUBCUTANEOUS at 17:02

## 2022-04-20 RX ADMIN — Medication 3 MILLILITER(S): at 20:28

## 2022-04-20 RX ADMIN — PIPERACILLIN AND TAZOBACTAM 3.38 GRAM(S): 4; .5 INJECTION, POWDER, LYOPHILIZED, FOR SOLUTION INTRAVENOUS at 13:01

## 2022-04-20 RX ADMIN — GABAPENTIN 300 MILLIGRAM(S): 400 CAPSULE ORAL at 22:12

## 2022-04-20 RX ADMIN — WARFARIN SODIUM 2.5 MILLIGRAM(S): 2.5 TABLET ORAL at 22:15

## 2022-04-20 NOTE — ED PROVIDER NOTE - NS ED ATTENDING STATEMENT MOD
This was a shared visit with the JEFFERSON. I reviewed and verified the documentation and independently performed the documented:

## 2022-04-20 NOTE — H&P ADULT - PROBLEM SELECTOR PLAN 8
paroxysmal, s/p cardiac ablation  -EKG shows pt currently in sinus rhythm  -on cardizem and coumadin at home, continue  -takes coumadin 4 mg 4x/week and 6 mg 3x/week at home  -dose coumadin based on daily PT/INR paroxysmal, s/p cardiac ablation  -EKG shows pt currently in sinus rhythm  -on cardizem, amiodarone, and coumadin at home, continue  -takes coumadin 4 mg 4x/week and 6 mg 3x/week at home  -dose coumadin based on daily PT/INR

## 2022-04-20 NOTE — ED PROVIDER NOTE - PROGRESS NOTE DETAILS
Reevaluated patient at bedside with daughter at bedside.  Patient feeling much better. Discussed the results of all diagnostic testing in ED and copies of all available reports given.   An opportunity to ask questions was provided.  Discussed the importance of prompt, close medical follow-up. Possible infiltrate on cxr, can continue abx prescribed by dr. roberson. Advised to d/w dr montano about infection since scheduled for transfusion, may want to postpone. Patient will return with any changes, concerns or persistent/worsening symptoms.  Understanding of all instructions verbalized. received phone call from dr montano prior to pt leaving; states pt was removed from the schedule for transfusion tomorrow due to hospital visit but he wants pt to receive 1 unit of blood; pt agreeable to receive blood in ED pt had fever, transfusion was stopped, tylenol given - spoke with Dr. Parker and states give tylenol and complete transfusion - suspect fever from virus    Dr. Gallegos saw pt and wants pt admitted for further treatment

## 2022-04-20 NOTE — ED PROVIDER NOTE - PROVIDER TOKENS
FREE:[LAST:[da],PHONE:[(   )    -],FAX:[(   )    -]],PROVIDER:[TOKEN:[[id number]]],PROVIDER:[TOKEN:[[id number]]]

## 2022-04-20 NOTE — H&P ADULT - NSHPREVIEWOFSYSTEMS_GEN_ALL_CORE
CONSTITUTIONAL: admits fever, chills, fatigue, weakness  HEENT: denies blurred visions, sore throat  SKIN: denies new lesions, rash  CARDIOVASCULAR: denies chest pain, chest pressure, palpitations  RESPIRATORY: admits shortness of breath, sputum production  GASTROINTESTINAL: denies nausea, vomiting, diarrhea, abdominal pain  GENITOURINARY: denies dysuria, discharge  NEUROLOGICAL: denies numbness, headache, focal weakness  MUSCULOSKELETAL: denies new joint pain, muscle aches  HEMATOLOGIC: denies gross bleeding, bruising  LYMPHATICS: denies enlarged lymph nodes, extremity swelling  PSYCHIATRIC: denies recent changes in anxiety, depression  ENDOCRINOLOGIC: denies sweating, cold or heat intolerance

## 2022-04-20 NOTE — H&P ADULT - PROBLEM SELECTOR PLAN 1
CXR showing RUL PNA, parainfluenza positive, likely superimposed bacterial PNA  -s/p Zosyn in the ED, continue with CTX and Azithromycin  -check urine legionella, strep pneumo, MRSA PCR  -f/u blood cx  -trend temps, wbc curve  -contact precautions for parainfluenza CXR showing RUL PNA, parainfluenza positive, likely superimposed bacterial PNA  -s/p Zosyn in the ED, continue with CTX and Azithromycin  -check urine legionella, strep pneumo, MRSA PCR  -check procal  -f/u blood cx  -trend temps, wbc curve  -contact precautions for parainfluenza CXR showing RUL PNA, parainfluenza positive, likely COPD exacerbation with superimposed bacterial PNA  -s/p Zosyn in the ED, continue with rocephin and Azithromycin  -check urine legionella, strep pneumo, MRSA PCR  -check procal  -f/u blood cx  -trend temps, wbc curve  -contact precautions for parainfluenza

## 2022-04-20 NOTE — H&P ADULT - PROBLEM SELECTOR PLAN 7
HSQ for DVT ppx hx of diabetes  -not on insulin  -low dose ISS, accuchecks, hypoglycemia protocol  -f/u HbA1c

## 2022-04-20 NOTE — H&P ADULT - NSHPPHYSICALEXAM_GEN_ALL_CORE
T(C): 37.7 (04-20-22 @ 18:59), Max: 39.4 (04-20-22 @ 11:14)  HR: 87 (04-20-22 @ 17:28) (62 - 98)  BP: 135/74 (04-20-22 @ 17:28) (97/53 - 163/76)  RR: 20 (04-20-22 @ 17:28) (18 - 30)  SpO2: 97% (04-20-22 @ 17:28) (96% - 98%)    GENERAL: obese patient appears well, no acute distress, appropriate, pleasant, cannot complete full sentences without taking a breath  EYES: sclera clear, no exudates  ENMT: oropharynx clear without erythema, no exudates, moist mucous membranes  NECK: supple, soft, no thyromegaly noted  LUNGS: good air entry bilaterally, clear to auscultation, symmetric breath sounds, no wheezing or rhonchi appreciated  HEART: soft S1/S2, regular rate and rhythm, no murmurs noted, no lower extremity edema  GASTROINTESTINAL: abdomen is soft, nontender, nondistended, normoactive bowel sounds, no palpable masses  INTEGUMENT: good skin turgor, no lesions noted  MUSCULOSKELETAL: no clubbing or cyanosis, no obvious deformity  NEUROLOGIC: awake, alert, oriented x3, good muscle tone in 4 extremities, no obvious sensory deficits  PSYCHIATRIC: mood is good, affect is congruent, linear and logical thought process

## 2022-04-20 NOTE — ED ADULT NURSE REASSESSMENT NOTE - NS ED NURSE REASSESS COMMENT FT1
1728:  patient was going to be d/c, but Sweetie had called requesting we give her one unit of PRBC's.  It was explained to the patient / daughter that we can do this, but if she is to develop a fever any time during transfusion, we will likely have to stop the transfusion for a "potential" allergic reaction.  The patient and daughter are aware and concur with the plan.  The transfusion was started during my break, and 15 min later, during first post tx check, patient was noted to have an oral temp of 102.  I checked with a second oral thermometer and this read 99.9.  I performed a rectal which read as 101.3.  Per MD, stop transfusion (1728) and record as a transfusion reaction.  proper form filled out, blood and tubing brought down to [facility] in the blood bank.  Will require first urine and pink top will need to be collected.  awaiting order set for transfusion reaction to be placed.  patient ws medicated as ordered for fever.  will continue to monitor.  ana guardado.

## 2022-04-20 NOTE — ED ADULT NURSE REASSESSMENT NOTE - NS ED NURSE REASSESS COMMENT FT1
1525:  patient was scheduled for d/c, but Sweetie DURAN called and requested that blood transfusion be performed today instead of her going to out patient facility here tomorrow.  Transfusion nurse came down and stated that because of her complaints and a fever, they cannot clear her for transfusion at this time and so her appt was cancelled for tomorrow.  I discussed this in length with the patient and informed her that in case she develops a fever, which is very likely since she came in with one, I will have no choice but to stop the transfusion.  the patient and daughter expressed understanding of this.  iv was removed, so new line will be placed. d/c papers taken back.  ana guardado.

## 2022-04-20 NOTE — ED PROVIDER NOTE - OBJECTIVE STATEMENT
81 F hx afib (on coumadin), COPD, anemia, neuropathy, p/w fever, cough x 4 days. Daughter states was having low grade temp 99F but then was increasing yesterday, Tmax 104F. Spoke to pulmonologist Dr. Gallegos and was prescribed abx (cephalosporin, ?cefpodoxime), took 1 dose. Pt is due for blood transfusion tomorrow, last transfusion was 2 months ago, prior to that was 2 year prior, was told it is due to suspected bone marrow disease. Last dose tylenol.  with similar symptoms that started after onset of pt's symptoms.     PMD MEGAN Stout  Heme RADHA Parker

## 2022-04-20 NOTE — PATIENT PROFILE ADULT - FALL HARM RISK - HARM RISK INTERVENTIONS
Assistance with ambulation/Assistance OOB with selected safe patient handling equipment/Communicate Risk of Fall with Harm to all staff/Monitor gait and stability/Reinforce activity limits and safety measures with patient and family/Tailored Fall Risk Interventions/Use of alarms - bed, chair and/or voice tab/Visual Cue: Yellow wristband and red socks/Bed in lowest position, wheels locked, appropriate side rails in place/Call bell, personal items and telephone in reach/Instruct patient to call for assistance before getting out of bed or chair/Non-slip footwear when patient is out of bed/[place] to call system/Physically safe environment - no spills, clutter or unnecessary equipment/Purposeful Proactive Rounding/Room/bathroom lighting operational, light cord in reach

## 2022-04-20 NOTE — ED ADULT NURSE REASSESSMENT NOTE - NS ED NURSE REASSESS COMMENT FT1
Patient received from day shift, AOx4, states is not in any acute distress at this time. Patient is awaiting bed assignment, safety precautions in place, will continue to monitor at this time.

## 2022-04-20 NOTE — ED PROVIDER NOTE - CARE PROVIDERS DIRECT ADDRESSES
,DirectAddress_Unknown,DirectAddress_Unknown,[email or web address]

## 2022-04-20 NOTE — H&P ADULT - PROBLEM SELECTOR PLAN 5
BUN/Cr 25/1.5, baseline Cr appears to be 1.4  -trend renal indices  -caution with nephrotoxic medications seen by pulm Dr. roberson in ED  -started on IV solumedrol 40 BID, duonebs q6h  -cont therapeutic interchange for trelegy ellipta  -monitor O2 sat, currently on room air hx of diastolic heart failure  -recent TTE showing normal LVEF but stage II diastolic dysfunction  -caution with aggressive IVF hydration  -appears euvolemic at this time

## 2022-04-20 NOTE — ED PROVIDER NOTE - CARE PROVIDER_API CALL
da,   Phone: (   )    -  Fax: (   )    -  Follow Up Time:     Hari Parker)  Hematology; Internal Medicine; Medical Oncology  [address], [address]  [place] [address]  Phone: [phone]  Fax: [phone]  Follow Up Time:     Severiano Rodas)  Internal Medicine  [address] [place] [address]  Phone: [phone]  Fax: [phone]  Follow Up Time:

## 2022-04-20 NOTE — ED PROVIDER NOTE - PATIENT PORTAL LINK FT
You can access the FollowMyHealth Patient Portal offered by [facility] by registering at the following website: [email or web address]. By joining [employer]’s FollowMyHealth portal, you will also be able to view your health information using other applications (apps) compatible with our system.

## 2022-04-20 NOTE — ED PROVIDER NOTE - NSFOLLOWUPINSTRUCTIONS_ED_ALL_ED_FT
Viral test positive for parainfluenza.  Take tylenol to help with fever. Monitor your temperature.  Make sure you are staying hydrated.  Possible infiltrate/pneumonia seen on chest xray, can continue antibiotics prescribed by Dr. Gallegos.  Discussed with Dr. Parker about infection, may want to postpone.  Return to the Emergency Department for worsening or concerning symptoms.      A viral respiratory infection is an illness that affects parts of the body that are used for breathing. These include the lungs, nose, and throat. It is caused by a germ called a virus.    Some examples of this kind of infection are:  •A cold.      •The flu (influenza).      •A respiratory syncytial virus (RSV) infection.      A person who gets this illness may have the following symptoms:  •A stuffy or runny nose.      •Yellow or green fluid in the nose.      •A cough.      •Sneezing.      •Tiredness (fatigue).      •Achy muscles.      •A sore throat.      •Sweating or chills.      •A fever.      •A headache.        Follow these instructions at home:    Managing pain and congestion     •Take over-the-counter and prescription medicines only as told by your doctor.      •If you have a sore throat, gargle with salt water. Do this 3–4 times per day or as needed. To make a salt-water mixture, dissolve ½–1 tsp of salt in 1 cup of warm water. Make sure that all the salt dissolves.      •Use nose drops made from salt water. This helps with stuffiness (congestion). It also helps soften the skin around your nose.      •Drink enough fluid to keep your pee (urine) pale yellow.        General instructions      •Rest as much as possible.      • Do not drink alcohol.      • Do not use any products that have nicotine or tobacco, such as cigarettes and e-cigarettes. If you need help quitting, ask your doctor.      •Keep all follow-up visits as told by your doctor. This is important.        How is this prevented?      •Get a flu shot every year. Ask your doctor when you should get your flu shot.    • Do not let other people get your germs. If you are sick:  •Stay home from work or school.      •Wash your hands with soap and water often. Wash your hands after you cough or sneeze. If soap and water are not available, use hand .        •Avoid contact with people who are sick during cold and flu season. This is in fall and winter.        Get help if:    •Your symptoms last for 10 days or longer.      •Your symptoms get worse over time.      •You have a fever.      •You have very bad pain in your face or forehead.      •Parts of your jaw or neck become very swollen.        Get help right away if:    •You feel pain or pressure in your chest.      •You have shortness of breath.      •You faint or feel like you will faint.      •You keep throwing up (vomiting).      •You feel confused.        Summary    •A viral respiratory infection is an illness that affects parts of the body that are used for breathing.      •Examples of this illness include a cold, the flu, and respiratory syncytial virus (RSV) infection.      •The infection can cause a runny nose, cough, sneezing, sore throat, and fever.      •Follow what your doctor tells you about taking medicines, drinking lots of fluid, washing your hands, resting at home, and avoiding people who are sick.      This information is not intended to replace advice given to you by your health care provider. Make sure you discuss any questions you have with your health care provider. Viral test positive for parainfluenza.  Take tylenol to help with fever. Monitor your temperature.  Make sure you are staying hydrated.  Possible infiltrate/pneumonia seen on chest xray, can continue antibiotics prescribed by Dr. Gallegos.  Return to the Emergency Department for worsening or concerning symptoms.      A viral respiratory infection is an illness that affects parts of the body that are used for breathing. These include the lungs, nose, and throat. It is caused by a germ called a virus.    Some examples of this kind of infection are:  •A cold.      •The flu (influenza).      •A respiratory syncytial virus (RSV) infection.      A person who gets this illness may have the following symptoms:  •A stuffy or runny nose.      •Yellow or green fluid in the nose.      •A cough.      •Sneezing.      •Tiredness (fatigue).      •Achy muscles.      •A sore throat.      •Sweating or chills.      •A fever.      •A headache.        Follow these instructions at home:    Managing pain and congestion     •Take over-the-counter and prescription medicines only as told by your doctor.      •If you have a sore throat, gargle with salt water. Do this 3–4 times per day or as needed. To make a salt-water mixture, dissolve ½–1 tsp of salt in 1 cup of warm water. Make sure that all the salt dissolves.      •Use nose drops made from salt water. This helps with stuffiness (congestion). It also helps soften the skin around your nose.      •Drink enough fluid to keep your pee (urine) pale yellow.        General instructions      •Rest as much as possible.      • Do not drink alcohol.      • Do not use any products that have nicotine or tobacco, such as cigarettes and e-cigarettes. If you need help quitting, ask your doctor.      •Keep all follow-up visits as told by your doctor. This is important.        How is this prevented?      •Get a flu shot every year. Ask your doctor when you should get your flu shot.    • Do not let other people get your germs. If you are sick:  •Stay home from work or school.      •Wash your hands with soap and water often. Wash your hands after you cough or sneeze. If soap and water are not available, use hand .        •Avoid contact with people who are sick during cold and flu season. This is in fall and winter.        Get help if:    •Your symptoms last for 10 days or longer.      •Your symptoms get worse over time.      •You have a fever.      •You have very bad pain in your face or forehead.      •Parts of your jaw or neck become very swollen.        Get help right away if:    •You feel pain or pressure in your chest.      •You have shortness of breath.      •You faint or feel like you will faint.      •You keep throwing up (vomiting).      •You feel confused.        Summary    •A viral respiratory infection is an illness that affects parts of the body that are used for breathing.      •Examples of this illness include a cold, the flu, and respiratory syncytial virus (RSV) infection.      •The infection can cause a runny nose, cough, sneezing, sore throat, and fever.      •Follow what your doctor tells you about taking medicines, drinking lots of fluid, washing your hands, resting at home, and avoiding people who are sick.      This information is not intended to replace advice given to you by your health care provider. Make sure you discuss any questions you have with your health care provider.

## 2022-04-20 NOTE — H&P ADULT - PROBLEM SELECTOR PLAN 3
Hb 8.5, leukopenia 2.29, plt 106  -hx of recently diagnosed immunodeficiency, receiving monthly IVIG infusions (s/p IVIG March 18th, next scheduled infusion 04/25)  -follows with Dr. Parker outpatient  -CBC daily  -heme/onc consulted, f/u recs H/H 8.5/26.2, at baseline  -per heme/onc, attempted to receive 1 U pRBC transfusion in ED but spiked a high fever  -trend temps, once fevers improve, will attempt 1U pRBC transfusion  -heme/onc consulted, f/u recs

## 2022-04-20 NOTE — ED PROVIDER NOTE - ATTENDING APP SHARED VISIT CONTRIBUTION OF CARE
Exam revealed a febrile elderly white female in NAD with fine BSs at bases, normal heart sounds, obese but benign abdomen and trace pitting pretibial edema. I agree the plan and management outlined by PA.

## 2022-04-20 NOTE — H&P ADULT - PROBLEM SELECTOR PLAN 6
paroxysmal, s/p cardiac ablation  -EKG shows pt currently in sinus rhythm  -on cardizem and coumadin at home, continue  -takes coumadin 4 mg 4x/week and 6 mg 3x/week at home  -dose coumadin based on daily PT/INR BUN/Cr 25/1.5, baseline Cr appears to be 1.4  -trend renal indices  -caution with nephrotoxic medications

## 2022-04-20 NOTE — CHART NOTE - NSCHARTNOTEFT_GEN_A_CORE
Impression:    COPD with exacerbation secondary to parainfluenza bronchitis  Acute Hypoxic respiratory failure  PAF - S/P ablation  Chronic Kidney Disease  Hx Anemia and possible MDS  Former smoker  Hx Pulmonary emboli following hip surgery several years ago    Plan:    Admit to hospital  IV Solumedrol  Albuterol + Atrovent by nebulizer  Supplemental oxygen and follow pulse oximetry  Anticoagulation

## 2022-04-20 NOTE — H&P ADULT - HISTORY OF PRESENT ILLNESS
82 yo F with PMHx of COPD (not on home O2), AF, aplastic anemia,  82 yo F with PMHx of COPD (not on home O2), pAF (s/p cardiac ablation on coumadin), aplastic anemia (possible MDS), ?immunodeficiency (recently started on gammaglobulin therapy but unsure of name), CKD presented to the ED due to confusion and difficulty breathing with high fevers. Pt's daughter at bedside. States that for the past few days, pt has been dealing with a respiratory infection and low grade fever. However, symptoms persisted so yesterday, patient was started on antibiotics. This morning, pt was noted to be confused with a temp of 104F. Daughter called Dr. Gallegos (pulm) and was told to bring pt to the ED. Pt reports that she still has mild dyspnea. Unable to speak in full sentences at the moment. Pt was scheduled to have a blood transfusion tomorrow. Heme/onc called by ED and was attempted to be given blood transfusion in ER but patient spiked a high fever and transfusion was stopped. Of note, pt has been receiving IVIG infusions. s/p IVIG on March 18th and next scheduled infusion was 4/25. NO sick contacts.    In the ED, VS T102.9F HR 98 /76 RR 30 SpO2 98% on RA. Labs significant for leukopenia 2.29, anemia 8.5/26.2, thrombocytopenia 106, BUN/Cr 25/1.5, RVP parainfluenza positive.  Received Zosyn x1, 1.7L NS bolus x1, tylenol 975 mg PO x2, duoneb x1 in the ED.  CXR showing RUL PNA  EKG NSR with nonspecific ST abnormality unchanged from prior EKG 82 yo F with PMHx of COPD (not on home O2), pAF (s/p cardiac ablation on coumadin), T2DM, hip necrosis, aplastic anemia (possible MDS), ?immunodeficiency (recently 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 Pt's daughter at bedside. States that for the past few days, pt has been dealing with a respiratory infection and low grade fever. However, symptoms persisted so yesterday, patient was started on antibiotics. This morning, pt was noted to be confused with a temp of 104F. Daughter called Dr. Gallegos (pulm) and was told to bring pt to the ED. Pt reports that she still has mild dyspnea. Unable to speak in full sentences at the moment. Pt was scheduled to have a blood transfusion tomorrow. Heme/onc called by ED and was attempted to be given blood transfusion in ER but patient spiked a high fever and transfusion was stopped. Of note, pt has been receiving IVIG infusions. s/p IVIG on March 18th and next scheduled infusion was 4/25. NO sick contacts.    In the ED, VS T102.9F HR 98 /76 RR 30 SpO2 98% on RA. Labs significant for leukopenia 2.29, anemia 8.5/26.2, thrombocytopenia 106, BUN/Cr 25/1.5, RVP parainfluenza positive.  Received Zosyn x1, 1.7L NS bolus x1, tylenol 975 mg PO x2, duoneb x1 in the ED.  CXR showing RUL PNA  EKG NSR with nonspecific ST abnormality unchanged from prior EKG

## 2022-04-20 NOTE — PATIENT PROFILE ADULT - 
ADDITIONAL INFORMATION
Pt states she received both COVID19 vaccinations and 1 Booster. She doesn't recall what month she took the booster but states all were through [employer]

## 2022-04-20 NOTE — H&P ADULT - ASSESSMENT
80 yo F with PMHx of COPD (not on home O2), pAF (s/p cardiac ablation on coumadin), aplastic anemia (possible MDS), ?immunodeficiency (recently started on gammaglobulin therapy but unsure of name), CKD presented to the ED due to confusion and difficulty breathing with high fevers, admitted for superimposed bacterial pneumonia and COPD 80 yo F with PMHx of COPD (not on home O2), pAF (s/p cardiac ablation on coumadin), T2DM, hip necrosis, aplastic anemia (possible MDS), ?immunodeficiency (recently started on gammaglobulin therapy but unsure of name), CKD, HFpEF presented to the ED due to confusion and difficulty breathing with high fevers, admitted for superimposed bacterial pneumonia and COPD

## 2022-04-20 NOTE — H&P ADULT - ATTENDING COMMENTS
80 yo F with PMHx of COPD (not on home O2), pAF (s/p cardiac ablation on coumadin), T2DM, hip necrosis, aplastic anemia (possible MDS), ?immunodeficiency (recently started on gammaglobulin therapy but unsure of name), CKD, HFpEF presented to the ED due to confusion and difficulty breathing with high fevers, admitted for superimposed bacterial pneumonia and COPD exacerbation     Assessment and plan as above. Edited personally where appropriate directly into the body of the note.

## 2022-04-20 NOTE — H&P ADULT - PROBLEM SELECTOR PLAN 2
H/H 8.5/26.2, at baseline  -per heme/onc, attempted to receive 1 U pRBC transfusion in ED but spiked a high fever  -trend temps, once fevers improve, will attempt 1U pRBC transfusion  -heme/onc consulted, f/u recs seen by pulm Dr. roberson in ED  -started on IV solumedrol 40 BID, duonebs q6h  -cont therapeutic interchange for trelegy ellipta  -monitor O2 sat, currently on room air

## 2022-04-20 NOTE — ED ADULT NURSE REASSESSMENT NOTE - NS ED NURSE REASSESS COMMENT FT1
1546:  OR called for report.  report given to Elvira.  patient will likely not go until 1900.  patient to remain NPO.  ana guardado.

## 2022-04-20 NOTE — ED ADULT NURSE REASSESSMENT NOTE - NS ED NURSE REASSESS COMMENT FT1
1839:  patient placed on purewick as first urine is needed for blood transfusion reaction.  The patient became sob, oxygenation down to 90.  placed on 4L and repositioned, 96%.  ana guardado.

## 2022-04-20 NOTE — ED PROVIDER NOTE - CLINICAL SUMMARY MEDICAL DECISION MAKING FREE TEXT BOX
82 yo with A. Fib and COPD sent here for evaluation of lethargy, fever, mild cough and sputum. This case will require evaluation, labs, imaging, ecg as well as imaging and meds/IVFs.

## 2022-04-20 NOTE — ED PROVIDER NOTE - CARE PLAN
1 Principal Discharge DX:	Parainfluenza   Principal Discharge DX:	Parainfluenza  Secondary Diagnosis:	COPD exacerbation

## 2022-04-20 NOTE — H&P ADULT - NSHPSOCIALHISTORY_GEN_ALL_CORE
Lives at home with  and son, ADLs independent, former smoker, 20 pack year history quit in 1989, denies alcohol or recreational drug use

## 2022-04-20 NOTE — ED ADULT NURSE NOTE - OBJECTIVE STATEMENT
patient presents to the er with complaints of fever and cough for 4 days.  No other sickness at home during this time.  Patient tried taking Tylenol a few times w/no relief.  No ibuprofen as she states she cannot take this medication.  She denies any complaints of pain.  She denies any sob.  She does appear to be slightly confused, daughter at bedside, feels it is related to the fever.  Per daughter, she was as high as 104.  Productive cough noted.  some wheezing auscultated. patient was scheduled to come here tomorrow for a blood transfusion secondary to her pneumonia.  ekg in progress.  ana guardado.

## 2022-04-20 NOTE — H&P ADULT - PROBLEM SELECTOR PLAN 4
seen by pulm Dr. roberson in ED  -started on IV solumedrol 40 BID, duonebs q6h  -cont therapeutic interchange for trelegy ellipta  -monitor O2 sat, currently on room air hx of diastolic heart failure  -recent TTE showing normal LVEF but stage II diastolic dysfunction  -caution with aggressive IVF hydration  -appears euvolemic at this time Hb 8.5, leukopenia 2.29, plt 106  -hx of recently diagnosed immunodeficiency, receiving monthly IVIG infusions (s/p IVIG March 18th, next scheduled infusion 04/25)  -follows with Dr. Parker outpatient  -CBC daily  -heme/onc consulted, f/u recs

## 2022-04-21 LAB
A1C WITH ESTIMATED AVERAGE GLUCOSE RESULT: 5.5 % — SIGNIFICANT CHANGE UP (ref 4–5.6)
ALBUMIN SERPL ELPH-MCNC: 3.1 G/DL — LOW (ref 3.3–5)
ALP SERPL-CCNC: 59 U/L — SIGNIFICANT CHANGE UP (ref 40–120)
ALT FLD-CCNC: 15 U/L — SIGNIFICANT CHANGE UP (ref 12–78)
ANION GAP SERPL CALC-SCNC: 7 MMOL/L — SIGNIFICANT CHANGE UP (ref 5–17)
APPEARANCE UR: CLEAR — SIGNIFICANT CHANGE UP
AST SERPL-CCNC: 15 U/L — SIGNIFICANT CHANGE UP (ref 15–37)
BASOPHILS # BLD AUTO: 0.01 K/UL — SIGNIFICANT CHANGE UP (ref 0–0.2)
BASOPHILS NFR BLD AUTO: 0.6 % — SIGNIFICANT CHANGE UP (ref 0–2)
BILIRUB SERPL-MCNC: 0.7 MG/DL — SIGNIFICANT CHANGE UP (ref 0.2–1.2)
BILIRUB UR-MCNC: NEGATIVE — SIGNIFICANT CHANGE UP
BUN SERPL-MCNC: 26 MG/DL — HIGH (ref 7–23)
CALCIUM SERPL-MCNC: 8.7 MG/DL — SIGNIFICANT CHANGE UP (ref 8.5–10.1)
CHLORIDE SERPL-SCNC: 107 MMOL/L — SIGNIFICANT CHANGE UP (ref 96–108)
CO2 SERPL-SCNC: 25 MMOL/L — SIGNIFICANT CHANGE UP (ref 22–31)
COLOR SPEC: YELLOW — SIGNIFICANT CHANGE UP
CREAT SERPL-MCNC: 1.3 MG/DL — SIGNIFICANT CHANGE UP (ref 0.5–1.3)
DIFF PNL FLD: ABNORMAL
EGFR: 42 ML/MIN/1.73M2 — LOW
EOSINOPHIL # BLD AUTO: 0 K/UL — SIGNIFICANT CHANGE UP (ref 0–0.5)
EOSINOPHIL NFR BLD AUTO: 0 % — SIGNIFICANT CHANGE UP (ref 0–6)
ESTIMATED AVERAGE GLUCOSE: 111 MG/DL — SIGNIFICANT CHANGE UP (ref 68–114)
GLUCOSE SERPL-MCNC: 161 MG/DL — HIGH (ref 70–99)
GLUCOSE UR QL: NEGATIVE — SIGNIFICANT CHANGE UP
HCT VFR BLD CALC: 27.6 % — LOW (ref 34.5–45)
HGB BLD-MCNC: 8.8 G/DL — LOW (ref 11.5–15.5)
IMM GRANULOCYTES NFR BLD AUTO: 4.7 % — HIGH (ref 0–1.5)
INR BLD: 2.52 RATIO — HIGH (ref 0.88–1.16)
KETONES UR-MCNC: NEGATIVE — SIGNIFICANT CHANGE UP
LEGIONELLA AG UR QL: NEGATIVE — SIGNIFICANT CHANGE UP
LEUKOCYTE ESTERASE UR-ACNC: NEGATIVE — SIGNIFICANT CHANGE UP
LYMPHOCYTES # BLD AUTO: 0.23 K/UL — LOW (ref 1–3.3)
LYMPHOCYTES # BLD AUTO: 13.5 % — SIGNIFICANT CHANGE UP (ref 13–44)
MCHC RBC-ENTMCNC: 31.9 GM/DL — LOW (ref 32–36)
MCHC RBC-ENTMCNC: 32.1 PG — SIGNIFICANT CHANGE UP (ref 27–34)
MCV RBC AUTO: 100.7 FL — HIGH (ref 80–100)
MONOCYTES # BLD AUTO: 0.19 K/UL — SIGNIFICANT CHANGE UP (ref 0–0.9)
MONOCYTES NFR BLD AUTO: 11.2 % — SIGNIFICANT CHANGE UP (ref 2–14)
NEUTROPHILS # BLD AUTO: 1.19 K/UL — LOW (ref 1.8–7.4)
NEUTROPHILS NFR BLD AUTO: 70 % — SIGNIFICANT CHANGE UP (ref 43–77)
NITRITE UR-MCNC: NEGATIVE — SIGNIFICANT CHANGE UP
NRBC # BLD: 4 /100 WBCS — HIGH (ref 0–0)
PH UR: 5 — SIGNIFICANT CHANGE UP (ref 5–8)
PLATELET # BLD AUTO: 125 K/UL — LOW (ref 150–400)
POTASSIUM SERPL-MCNC: 5 MMOL/L — SIGNIFICANT CHANGE UP (ref 3.5–5.3)
POTASSIUM SERPL-SCNC: 5 MMOL/L — SIGNIFICANT CHANGE UP (ref 3.5–5.3)
PROCALCITONIN SERPL-MCNC: <0.05 — SIGNIFICANT CHANGE UP (ref 0–0.04)
PROT SERPL-MCNC: 6.6 G/DL — SIGNIFICANT CHANGE UP (ref 6–8.3)
PROT UR-MCNC: 30 MG/DL
PROTHROM AB SERPL-ACNC: 29.8 SEC — HIGH (ref 10.5–13.4)
RBC # BLD: 2.74 M/UL — LOW (ref 3.8–5.2)
RBC # FLD: 21.8 % — HIGH (ref 10.3–14.5)
S PNEUM AG UR QL: NEGATIVE — SIGNIFICANT CHANGE UP
SODIUM SERPL-SCNC: 139 MMOL/L — SIGNIFICANT CHANGE UP (ref 135–145)
SP GR SPEC: 1.02 — SIGNIFICANT CHANGE UP (ref 1.01–1.02)
UROBILINOGEN FLD QL: NEGATIVE — SIGNIFICANT CHANGE UP
WBC # BLD: 1.7 K/UL — LOW (ref 3.8–10.5)
WBC # FLD AUTO: 1.7 K/UL — LOW (ref 3.8–10.5)

## 2022-04-21 PROCEDURE — 99221 1ST HOSP IP/OBS SF/LOW 40: CPT

## 2022-04-21 PROCEDURE — 99233 SBSQ HOSP IP/OBS HIGH 50: CPT

## 2022-04-21 RX ORDER — WARFARIN SODIUM 2.5 MG/1
4 TABLET ORAL ONCE
Refills: 0 | Status: COMPLETED | OUTPATIENT
Start: 2022-04-21 | End: 2022-04-21

## 2022-04-21 RX ADMIN — Medication 1 MILLIGRAM(S): at 12:11

## 2022-04-21 RX ADMIN — Medication 40 MILLIGRAM(S): at 05:31

## 2022-04-21 RX ADMIN — Medication 1: at 17:10

## 2022-04-21 RX ADMIN — Medication 1: at 12:10

## 2022-04-21 RX ADMIN — SIMVASTATIN 10 MILLIGRAM(S): 20 TABLET, FILM COATED ORAL at 21:42

## 2022-04-21 RX ADMIN — Medication 3 MILLILITER(S): at 20:29

## 2022-04-21 RX ADMIN — Medication 200 MILLIGRAM(S): at 10:49

## 2022-04-21 RX ADMIN — Medication 30 MILLIGRAM(S): at 05:30

## 2022-04-21 RX ADMIN — CEFTRIAXONE 100 MILLIGRAM(S): 500 INJECTION, POWDER, FOR SOLUTION INTRAMUSCULAR; INTRAVENOUS at 21:44

## 2022-04-21 RX ADMIN — Medication 1: at 08:20

## 2022-04-21 RX ADMIN — WARFARIN SODIUM 4 MILLIGRAM(S): 2.5 TABLET ORAL at 21:42

## 2022-04-21 RX ADMIN — Medication 3 MILLILITER(S): at 13:33

## 2022-04-21 RX ADMIN — Medication 200 MILLIGRAM(S): at 17:11

## 2022-04-21 RX ADMIN — BUDESONIDE AND FORMOTEROL FUMARATE DIHYDRATE 2 PUFF(S): 160; 4.5 AEROSOL RESPIRATORY (INHALATION) at 21:45

## 2022-04-21 RX ADMIN — MONTELUKAST 10 MILLIGRAM(S): 4 TABLET, CHEWABLE ORAL at 12:11

## 2022-04-21 RX ADMIN — BUDESONIDE AND FORMOTEROL FUMARATE DIHYDRATE 2 PUFF(S): 160; 4.5 AEROSOL RESPIRATORY (INHALATION) at 08:20

## 2022-04-21 RX ADMIN — TRAMADOL HYDROCHLORIDE 50 MILLIGRAM(S): 50 TABLET ORAL at 05:31

## 2022-04-21 RX ADMIN — Medication 30 MILLIGRAM(S): at 17:11

## 2022-04-21 RX ADMIN — GABAPENTIN 300 MILLIGRAM(S): 400 CAPSULE ORAL at 05:30

## 2022-04-21 RX ADMIN — AMIODARONE HYDROCHLORIDE 100 MILLIGRAM(S): 400 TABLET ORAL at 05:30

## 2022-04-21 RX ADMIN — PANTOPRAZOLE SODIUM 40 MILLIGRAM(S): 20 TABLET, DELAYED RELEASE ORAL at 05:30

## 2022-04-21 RX ADMIN — Medication 40 MILLIGRAM(S): at 17:10

## 2022-04-21 RX ADMIN — GABAPENTIN 300 MILLIGRAM(S): 400 CAPSULE ORAL at 21:42

## 2022-04-21 RX ADMIN — GABAPENTIN 300 MILLIGRAM(S): 400 CAPSULE ORAL at 14:29

## 2022-04-21 RX ADMIN — Medication 3 MILLILITER(S): at 09:00

## 2022-04-21 RX ADMIN — Medication 3 MILLILITER(S): at 01:03

## 2022-04-21 RX ADMIN — TRAMADOL HYDROCHLORIDE 50 MILLIGRAM(S): 50 TABLET ORAL at 17:12

## 2022-04-21 RX ADMIN — AZITHROMYCIN 255 MILLIGRAM(S): 500 TABLET, FILM COATED ORAL at 21:43

## 2022-04-21 NOTE — CONSULT NOTE ADULT - ASSESSMENT
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 Found to have acute bronchitis 2/2 parainfluenza infection, but cannot rule out bacterial pneumonia. CXR shows right upper lung opacity. She is leukopenic but feels better today. Serum procalcitonin is low.    -continue ceftriaxone 1g IV [id number] 7 day course if continues to improve  -continue azithromycin, but suggest discontinuing if legionella antigen is negative  -follow blood cultures  -suggest respiratory cultures  -discussed with daughter at bedside    Thank you for courtesy of this consult.   Will follow.    Kimberly Velazco MD  Division of Infectious Diseases   Cell [phone] between 8am and 6pm   After 6pm and weekends please call ID service at [phone].

## 2022-04-21 NOTE — CONSULT NOTE ADULT - SUBJECTIVE AND OBJECTIVE BOX
Patient is a 81y old  Female who presents with a chief complaint of superimposed bacterial PNA and COPD exacerbation (21 Apr 2022 14:46)      HPI:  82 yo F with PMHx of COPD (not on home O2), pAF (s/p cardiac ablation on coumadin), T2DM, hip necrosis, aplastic anemia (possible MDS), ?immunodeficiency (recently 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 Pt's daughter at bedside. States that for the past few days, pt has been dealing with a respiratory infection and low grade fever. However, symptoms persisted so yesterday, patient was started on antibiotics. This morning, pt was noted to be confused with a temp of 104F. Daughter called Dr. Gallegos (pulm) and was told to bring pt to the ED. Pt reports that she still has mild dyspnea. Unable to speak in full sentences at the moment. Pt was scheduled to have a blood transfusion tomorrow. Heme/onc called by ED and was attempted to be given blood transfusion in ER but patient spiked a high fever and transfusion was stopped. Of note, pt has been receiving IVIG infusions. s/p IVIG on March 18th and next scheduled infusion was 4/25. NO sick contacts.    In the ED, VS T102.9F HR 98 /76 RR 30 SpO2 98% on RA. Labs significant for leukopenia 2.29, anemia 8.5/26.2, thrombocytopenia 106, BUN/Cr 25/1.5, RVP parainfluenza positive.  Received Zosyn x1, 1.7L NS bolus x1, tylenol 975 mg PO x2, duoneb x1 in the ED.  CXR showing RUL PNA  EKG NSR with nonspecific ST abnormality unchanged from prior EKG (20 Apr 2022 20:01)       ROS:  Negative except for:    PAST MEDICAL & SURGICAL HISTORY:  COPD (chronic obstructive pulmonary disease)    DM (diabetes mellitus)    PAF (paroxysmal atrial fibrillation)  s/p ablation    Hiatal hernia    GIB (gastrointestinal bleeding)    Pericarditis    Shoulder fracture, right    Hematoma  Hematoma of Right wrist    S/P hysterectomy    S/P foot surgery    S/P knee surgery    After cataract  bilateral eye cataract surgically removed    Closed right hip fracture  rigth hip ORIF july 19 2016    S/P IVC filter  nov 2016    S/P carpal tunnel release  Right 2008 &amp; 6/27/17        SOCIAL HISTORY:    FAMILY HISTORY:  Family history of bladder cancer (Mother)    Family history of myocardial infarction (Father)        MEDICATIONS  (STANDING):  albuterol/ipratropium for Nebulization 3 milliLiter(s) Nebulizer every 6 hours  aMIOdarone    Tablet 100 milliGRAM(s) Oral daily  azithromycin  IVPB 500 milliGRAM(s) IV Intermittent every 24 hours  azithromycin  IVPB      budesonide  80 MICROgram(s)/formoterol 4.5 MICROgram(s) Inhaler 2 Puff(s) Inhalation two times a day  cefTRIAXone   IVPB      cefTRIAXone   IVPB 1000 milliGRAM(s) IV Intermittent every 24 hours  dextrose 5%. 1000 milliLiter(s) (100 mL/Hr) IV Continuous <Continuous>  dextrose 5%. 1000 milliLiter(s) (50 mL/Hr) IV Continuous <Continuous>  dextrose 50% Injectable 25 Gram(s) IV Push once  dextrose 50% Injectable 12.5 Gram(s) IV Push once  dextrose 50% Injectable 25 Gram(s) IV Push once  diltiazem    Tablet 30 milliGRAM(s) Oral two times a day  folic acid 1 milliGRAM(s) Oral daily  gabapentin 300 milliGRAM(s) Oral three times a day  glucagon  Injectable 1 milliGRAM(s) IntraMuscular once  insulin lispro (ADMELOG) corrective regimen sliding scale   SubCutaneous three times a day before meals  insulin lispro (ADMELOG) corrective regimen sliding scale   SubCutaneous at bedtime  methylPREDNISolone sodium succinate Injectable 40 milliGRAM(s) IV Push every 12 hours  montelukast 10 milliGRAM(s) Oral daily  pantoprazole    Tablet 40 milliGRAM(s) Oral before breakfast  simvastatin 10 milliGRAM(s) Oral at bedtime  tiotropium 18 MICROgram(s) Capsule 1 Capsule(s) Inhalation daily  traMADol 50 milliGRAM(s) Oral two times a day  warfarin 4 milliGRAM(s) Oral once    MEDICATIONS  (PRN):  acetaminophen     Tablet .. 650 milliGRAM(s) Oral every 6 hours PRN Temp greater or equal to 38C (100.4F), Mild Pain (1 - 3)  dextrose Oral Gel 15 Gram(s) Oral once PRN Blood Glucose LESS THAN 70 milliGRAM(s)/deciliter  dicyclomine 10 milliGRAM(s) Oral two times a day before meals PRN abd pain  guaiFENesin Oral Liquid (Sugar-Free) 200 milliGRAM(s) Oral every 6 hours PRN Cough      Allergies    No Known Allergies    Intolerances        Vital Signs Last 24 Hrs  T(C): 37.1 (21 Apr 2022 17:20), Max: 37.2 (21 Apr 2022 13:37)  T(F): 98.7 (21 Apr 2022 17:20), Max: 99 (21 Apr 2022 13:37)  HR: 72 (21 Apr 2022 17:20) (67 - 85)  BP: 132/70 (21 Apr 2022 17:20) (125/70 - 155/68)  BP(mean): --  RR: 17 (21 Apr 2022 17:20) (17 - 19)  SpO2: 98% (21 Apr 2022 17:20) (98% - 99%)    PHYSICAL EXAM  General: adult in NAD  HEENT: clear oropharynx, anicteric sclera, pink conjunctivae  Neck: supple  CV: normal S1S2 with no murmur rubs or gallops  Lungs: clear to auscultation, no wheezes, no rhales  Abdomen: soft non-tender non-distended, no hepato/splenomegaly  Ext: no clubbing cyanosis or edema  Skin: no rashes and no petichiae  Neuro: alert and oriented X3 no focal deficits      LABS:    CBC Full  -  ( 21 Apr 2022 08:03 )  WBC Count : 1.70 K/uL  RBC Count : 2.74 M/uL  Hemoglobin : 8.8 g/dL  Hematocrit : 27.6 %  Platelet Count - Automated : 125 K/uL  Mean Cell Volume : 100.7 fl  Mean Cell Hemoglobin : 32.1 pg  Mean Cell Hemoglobin Concentration : 31.9 gm/dL  Auto Neutrophil # : 1.19 K/uL  Auto Lymphocyte # : 0.23 K/uL  Auto Monocyte # : 0.19 K/uL  Auto Eosinophil # : 0.00 K/uL  Auto Basophil # : 0.01 K/uL  Auto Neutrophil % : 70.0 %  Auto Lymphocyte % : 13.5 %  Auto Monocyte % : 11.2 %  Auto Eosinophil % : 0.0 %  Auto Basophil % : 0.6 %    04-21    139  |  107  |  26<H>  ----------------------------<  161<H>  5.0   |  25  |  1.30    Ca    8.7      21 Apr 2022 08:03    TPro  6.6  /  Alb  3.1<L>  /  TBili  0.7  /  DBili  x   /  AST  15  /  ALT  15  /  AlkPhos  59  04-21    PT/INR - ( 21 Apr 2022 13:20 )   PT: 29.8 sec;   INR: 2.52 ratio         PTT - ( 20 Apr 2022 12:19 )  PTT:39.9 sec          BLOOD SMEAR INTERPRETATION:    RADIOLOGY & ADDITIONAL STUDIES:    < from: Xray Chest 1 View- PORTABLE-Urgent (04.20.22 @ 12:13) >  ACC: [id number] EXAM:  XR CHEST PORTABLE URGENT 1V                          PROCEDURE DATE:  04/20/2022          INTERPRETATION:  INDICATION: Sepsis    PRIORS: 12/28/2020    VIEWS: Portable AP radiography of the chest performed.    FINDINGS: Heart size appears within normal limits. No hilar or superior   mediastinal abnormalities are identified. A region of opacity is   identified overlying the right upper lung field at the level of the   anterior aspect of the right second rib, which may represent infiltrate   in light of provided clinical history of sepsis. There is no evidence for   pulmonary edema. No pleural effusion or pneumothorax is demonstrated. No   mediastinal shift is noted. The visualized osseous structures appear   unremarkable.    IMPRESSION: A region of opacity is identified overlying the right upper   lung field at the level of the anterior aspect of the right second rib,   which may represent infiltrate in light of provided clinical history of   sepsis. Clinical correlation and radiographic follow-up is recommended to   ensure clearing.    --- End of Report ---            EMILY NAVARRETE MD; Attending Radiologist  This document has been electronically signed. Apr 20 2022 12:20PM    < end of copied text >

## 2022-04-21 NOTE — PROGRESS NOTE ADULT - SUBJECTIVE AND OBJECTIVE BOX
Patient is a 81y old  Female who presents with a chief complaint of superimposed bacterial PNA and COPD exacerbation (2022 14:46)      INTERVAL HPI/OVERNIGHT EVENTS:    continues to have cough with chest congestion and shortness of breath    MEDICATIONS  (STANDING):  albuterol/ipratropium for Nebulization 3 milliLiter(s) Nebulizer every 6 hours  aMIOdarone    Tablet 100 milliGRAM(s) Oral daily  azithromycin  IVPB 500 milliGRAM(s) IV Intermittent every 24 hours  azithromycin  IVPB      budesonide  80 MICROgram(s)/formoterol 4.5 MICROgram(s) Inhaler 2 Puff(s) Inhalation two times a day  cefTRIAXone   IVPB      cefTRIAXone   IVPB 1000 milliGRAM(s) IV Intermittent every 24 hours  dextrose 5%. 1000 milliLiter(s) (100 mL/Hr) IV Continuous <Continuous>  dextrose 5%. 1000 milliLiter(s) (50 mL/Hr) IV Continuous <Continuous>  dextrose 50% Injectable 25 Gram(s) IV Push once  dextrose 50% Injectable 12.5 Gram(s) IV Push once  dextrose 50% Injectable 25 Gram(s) IV Push once  diltiazem    Tablet 30 milliGRAM(s) Oral two times a day  folic acid 1 milliGRAM(s) Oral daily  gabapentin 300 milliGRAM(s) Oral three times a day  glucagon  Injectable 1 milliGRAM(s) IntraMuscular once  insulin lispro (ADMELOG) corrective regimen sliding scale   SubCutaneous three times a day before meals  insulin lispro (ADMELOG) corrective regimen sliding scale   SubCutaneous at bedtime  methylPREDNISolone sodium succinate Injectable 40 milliGRAM(s) IV Push every 12 hours  montelukast 10 milliGRAM(s) Oral daily  pantoprazole    Tablet 40 milliGRAM(s) Oral before breakfast  simvastatin 10 milliGRAM(s) Oral at bedtime  tiotropium 18 MICROgram(s) Capsule 1 Capsule(s) Inhalation daily  traMADol 50 milliGRAM(s) Oral two times a day  warfarin 4 milliGRAM(s) Oral once      MEDICATIONS  (PRN):  acetaminophen     Tablet .. 650 milliGRAM(s) Oral every 6 hours PRN Temp greater or equal to 38C (100.4F), Mild Pain (1 - 3)  dextrose Oral Gel 15 Gram(s) Oral once PRN Blood Glucose LESS THAN 70 milliGRAM(s)/deciliter  dicyclomine 10 milliGRAM(s) Oral two times a day before meals PRN abd pain  guaiFENesin Oral Liquid (Sugar-Free) 200 milliGRAM(s) Oral every 6 hours PRN Cough      Allergies    No Known Allergies    Intolerances        PAST MEDICAL & SURGICAL HISTORY:  COPD (chronic obstructive pulmonary disease)    DM (diabetes mellitus)    PAF (paroxysmal atrial fibrillation)  s/p ablation    Hiatal hernia    GIB (gastrointestinal bleeding)    Pericarditis    Shoulder fracture, right    Hematoma  Hematoma of Right wrist    S/P hysterectomy    S/P foot surgery    S/P knee surgery    After cataract  bilateral eye cataract surgically removed    Closed right hip fracture  rigth hip ORIF 2016    S/P IVC filter  2016    S/P carpal tunnel release  Right  &amp; 17        Vital Signs Last 24 Hrs  T(C): 37.1 (2022 17:20), Max: 37.2 (2022 13:37)  T(F): 98.7 (2022 17:20), Max: 99 (2022 13:37)  HR: 72 (2022 17:20) (67 - 85)  BP: 132/70 (2022 17:20) (125/70 - 155/68)  BP(mean): --  RR: 17 (2022 17:20) (17 - 19)  SpO2: 98% (2022 17:20) (98% - 99%)    PHYSICAL EXAMINATION:    GENERAL: The patient is awake and alert in no apparent distress.     HEENT: Head is normocephalic and atraumatic. Extraocular muscles are intact. Mucous membranes are moist.    NECK: no JVD    LUNGS: mild bilateral expiratory wheezes    HEART: Regular rate and rhythm without murmur.    ABDOMEN: Soft, nontender, and nondistended.      EXTREMITIES: Without any cyanosis, clubbing, rash, lesions or edema.    NEUROLOGIC: Grossly intact.    SKIN: No ulceration or induration present.      LABS:                        8.8    1.70  )-----------( 125      ( 2022 08:03 )             27.6     04-    139  |  107  |  26<H>  ----------------------------<  161<H>  5.0   |  25  |  1.30    Ca    8.7      2022 08:03    TPro  6.6  /  Alb  3.1<L>  /  TBili  0.7  /  DBili  x   /  AST  15  /  ALT  15  /  AlkPhos  59  04-    PT/INR - ( 2022 13:20 )   PT: 29.8 sec;   INR: 2.52 ratio         PTT - ( 2022 12:19 )  PTT:39.9 sec  Urinalysis Basic - ( 2022 08:44 )    Color: Yellow / Appearance: Clear / S.020 / pH: x  Gluc: x / Ketone: Negative  / Bili: Negative / Urobili: Negative   Blood: x / Protein: 30 mg/dL / Nitrite: Negative   Leuk Esterase: Negative / RBC: 0-2 /HPF / WBC 0-2   Sq Epi: x / Non Sq Epi: Occasional / Bacteria: Occasional                  Procalcitonin, Serum: <0.05 (22 @ 08:03)      MICROBIOLOGY:  Culture Results:   No growth to date. ( @ 15:06)  Culture Results:   No growth to date. ( @ 15:06)      Assessment:    COPD with exacerbation  Parainfluenza Bronchitis  Possible superimposed bacterial pneumonia  Pancytopenia  Hx Pulmonary emboli - S/P IVC filter    Plan:    IV Solumedrol  IV Rocephin + Zithromax  Duoneb QID  Spiriva daily  Symbicort inhaler  Monitor pulse oximetry  Cover blood sugars       Patient is a 81y old  Female who presents with a chief complaint of superimposed bacterial PNA and COPD exacerbation (2022 14:46)      INTERVAL HPI/OVERNIGHT EVENTS:    continues to have cough with chest congestion and shortness of breath    MEDICATIONS  (STANDING):  albuterol/ipratropium for Nebulization 3 milliLiter(s) Nebulizer every 6 hours  aMIOdarone    Tablet 100 milliGRAM(s) Oral daily  azithromycin  IVPB 500 milliGRAM(s) IV Intermittent every 24 hours  azithromycin  IVPB      budesonide  80 MICROgram(s)/formoterol 4.5 MICROgram(s) Inhaler 2 Puff(s) Inhalation two times a day  cefTRIAXone   IVPB      cefTRIAXone   IVPB 1000 milliGRAM(s) IV Intermittent every 24 hours  dextrose 5%. 1000 milliLiter(s) (100 mL/Hr) IV Continuous <Continuous>  dextrose 5%. 1000 milliLiter(s) (50 mL/Hr) IV Continuous <Continuous>  dextrose 50% Injectable 25 Gram(s) IV Push once  dextrose 50% Injectable 12.5 Gram(s) IV Push once  dextrose 50% Injectable 25 Gram(s) IV Push once  diltiazem    Tablet 30 milliGRAM(s) Oral two times a day  folic acid 1 milliGRAM(s) Oral daily  gabapentin 300 milliGRAM(s) Oral three times a day  glucagon  Injectable 1 milliGRAM(s) IntraMuscular once  insulin lispro (ADMELOG) corrective regimen sliding scale   SubCutaneous three times a day before meals  insulin lispro (ADMELOG) corrective regimen sliding scale   SubCutaneous at bedtime  methylPREDNISolone sodium succinate Injectable 40 milliGRAM(s) IV Push every 12 hours  montelukast 10 milliGRAM(s) Oral daily  pantoprazole    Tablet 40 milliGRAM(s) Oral before breakfast  simvastatin 10 milliGRAM(s) Oral at bedtime  tiotropium 18 MICROgram(s) Capsule 1 Capsule(s) Inhalation daily  traMADol 50 milliGRAM(s) Oral two times a day  warfarin 4 milliGRAM(s) Oral once      MEDICATIONS  (PRN):  acetaminophen     Tablet .. 650 milliGRAM(s) Oral every 6 hours PRN Temp greater or equal to 38C (100.4F), Mild Pain (1 - 3)  dextrose Oral Gel 15 Gram(s) Oral once PRN Blood Glucose LESS THAN 70 milliGRAM(s)/deciliter  dicyclomine 10 milliGRAM(s) Oral two times a day before meals PRN abd pain  guaiFENesin Oral Liquid (Sugar-Free) 200 milliGRAM(s) Oral every 6 hours PRN Cough      Allergies    No Known Allergies    Intolerances        PAST MEDICAL & SURGICAL HISTORY:  COPD (chronic obstructive pulmonary disease)    DM (diabetes mellitus)    PAF (paroxysmal atrial fibrillation)  s/p ablation    Hiatal hernia    GIB (gastrointestinal bleeding)    Pericarditis    Shoulder fracture, right    Hematoma  Hematoma of Right wrist    S/P hysterectomy    S/P foot surgery    S/P knee surgery    After cataract  bilateral eye cataract surgically removed    Closed right hip fracture  rigth hip ORIF 2016    S/P IVC filter  2016    S/P carpal tunnel release  Right  &amp; 17        Vital Signs Last 24 Hrs  T(C): 37.1 (2022 17:20), Max: 37.2 (2022 13:37)  T(F): 98.7 (2022 17:20), Max: 99 (2022 13:37)  HR: 72 (2022 17:20) (67 - 85)  BP: 132/70 (2022 17:20) (125/70 - 155/68)  BP(mean): --  RR: 17 (2022 17:20) (17 - 19)  SpO2: 98% (2022 17:20) (98% - 99%)    PHYSICAL EXAMINATION:    GENERAL: The patient is awake and alert in no apparent distress.     HEENT: Head is normocephalic and atraumatic. Extraocular muscles are intact. Mucous membranes are moist.    NECK: no JVD    LUNGS: mild bilateral expiratory wheezes    HEART: Regular rate and rhythm without murmur.    ABDOMEN: Soft, nontender, and nondistended.      EXTREMITIES: Without any cyanosis, clubbing, rash, lesions or edema.    NEUROLOGIC: Grossly intact.    SKIN: No ulceration or induration present.      LABS:                        8.8    1.70  )-----------( 125      ( 2022 08:03 )             27.6     04-    139  |  107  |  26<H>  ----------------------------<  161<H>  5.0   |  25  |  1.30    Ca    8.7      2022 08:03    TPro  6.6  /  Alb  3.1<L>  /  TBili  0.7  /  DBili  x   /  AST  15  /  ALT  15  /  AlkPhos  59  04-    PT/INR - ( 2022 13:20 )   PT: 29.8 sec;   INR: 2.52 ratio         PTT - ( 2022 12:19 )  PTT:39.9 sec  Urinalysis Basic - ( 2022 08:44 )    Color: Yellow / Appearance: Clear / S.020 / pH: x  Gluc: x / Ketone: Negative  / Bili: Negative / Urobili: Negative   Blood: x / Protein: 30 mg/dL / Nitrite: Negative   Leuk Esterase: Negative / RBC: 0-2 /HPF / WBC 0-2   Sq Epi: x / Non Sq Epi: Occasional / Bacteria: Occasional                  Procalcitonin, Serum: <0.05 (22 @ 08:03)      MICROBIOLOGY:  Culture Results:   No growth to date. ( @ 15:06)  Culture Results:   No growth to date. ( @ 15:06)      Assessment:    COPD with exacerbation  Parainfluenza Bronchitis  Doubt superimposed bacterial pneumonia (no definite infiltrate on Chest x ray)  Pancytopenia  Hx Pulmonary emboli - S/P IVC filter    Plan:    IV Solumedrol  IV Rocephin + Zithromax (as per ID)  Duoneb QID  Spiriva daily  Symbicort inhaler  Monitor pulse oximetry  Cover blood sugars       Patient is a 81y old  Female who presents with a chief complaint of superimposed bacterial PNA and COPD exacerbation (2022 14:46)      INTERVAL HPI/OVERNIGHT EVENTS:    continues to have cough with chest congestion and shortness of breath    MEDICATIONS  (STANDING):  albuterol/ipratropium for Nebulization 3 milliLiter(s) Nebulizer every 6 hours  aMIOdarone    Tablet 100 milliGRAM(s) Oral daily  azithromycin  IVPB 500 milliGRAM(s) IV Intermittent every 24 hours  azithromycin  IVPB      budesonide  80 MICROgram(s)/formoterol 4.5 MICROgram(s) Inhaler 2 Puff(s) Inhalation two times a day  cefTRIAXone   IVPB      cefTRIAXone   IVPB 1000 milliGRAM(s) IV Intermittent every 24 hours  dextrose 5%. 1000 milliLiter(s) (100 mL/Hr) IV Continuous <Continuous>  dextrose 5%. 1000 milliLiter(s) (50 mL/Hr) IV Continuous <Continuous>  dextrose 50% Injectable 25 Gram(s) IV Push once  dextrose 50% Injectable 12.5 Gram(s) IV Push once  dextrose 50% Injectable 25 Gram(s) IV Push once  diltiazem    Tablet 30 milliGRAM(s) Oral two times a day  folic acid 1 milliGRAM(s) Oral daily  gabapentin 300 milliGRAM(s) Oral three times a day  glucagon  Injectable 1 milliGRAM(s) IntraMuscular once  insulin lispro (ADMELOG) corrective regimen sliding scale   SubCutaneous three times a day before meals  insulin lispro (ADMELOG) corrective regimen sliding scale   SubCutaneous at bedtime  methylPREDNISolone sodium succinate Injectable 40 milliGRAM(s) IV Push every 12 hours  montelukast 10 milliGRAM(s) Oral daily  pantoprazole    Tablet 40 milliGRAM(s) Oral before breakfast  simvastatin 10 milliGRAM(s) Oral at bedtime  tiotropium 18 MICROgram(s) Capsule 1 Capsule(s) Inhalation daily  traMADol 50 milliGRAM(s) Oral two times a day  warfarin 4 milliGRAM(s) Oral once      MEDICATIONS  (PRN):  acetaminophen     Tablet .. 650 milliGRAM(s) Oral every 6 hours PRN Temp greater or equal to 38C (100.4F), Mild Pain (1 - 3)  dextrose Oral Gel 15 Gram(s) Oral once PRN Blood Glucose LESS THAN 70 milliGRAM(s)/deciliter  dicyclomine 10 milliGRAM(s) Oral two times a day before meals PRN abd pain  guaiFENesin Oral Liquid (Sugar-Free) 200 milliGRAM(s) Oral every 6 hours PRN Cough      Allergies    No Known Allergies    Intolerances        PAST MEDICAL & SURGICAL HISTORY:  COPD (chronic obstructive pulmonary disease)    DM (diabetes mellitus)    PAF (paroxysmal atrial fibrillation)  s/p ablation    Hiatal hernia    GIB (gastrointestinal bleeding)    Pericarditis    Shoulder fracture, right    Hematoma  Hematoma of Right wrist    S/P hysterectomy    S/P foot surgery    S/P knee surgery    After cataract  bilateral eye cataract surgically removed    Closed right hip fracture  rigth hip ORIF 2016    S/P IVC filter  2016    S/P carpal tunnel release  Right  &amp; 17        Vital Signs Last 24 Hrs  T(C): 37.1 (2022 17:20), Max: 37.2 (2022 13:37)  T(F): 98.7 (2022 17:20), Max: 99 (2022 13:37)  HR: 72 (2022 17:20) (67 - 85)  BP: 132/70 (2022 17:20) (125/70 - 155/68)  BP(mean): --  RR: 17 (2022 17:20) (17 - 19)  SpO2: 98% (2022 17:20) (98% - 99%)    PHYSICAL EXAMINATION:    GENERAL: The patient is awake and alert in no apparent distress.     HEENT: Head is normocephalic and atraumatic. Extraocular muscles are intact. Mucous membranes are moist.    NECK: no JVD    LUNGS: mild bilateral expiratory wheezes    HEART: Regular rate and rhythm without murmur.    ABDOMEN: Soft, nontender, and nondistended.      EXTREMITIES: Without any cyanosis, clubbing, rash, lesions or edema.    NEUROLOGIC: Grossly intact.    SKIN: No ulceration or induration present.      LABS:                        8.8    1.70  )-----------( 125      ( 2022 08:03 )             27.6     04-    139  |  107  |  26<H>  ----------------------------<  161<H>  5.0   |  25  |  1.30    Ca    8.7      2022 08:03    TPro  6.6  /  Alb  3.1<L>  /  TBili  0.7  /  DBili  x   /  AST  15  /  ALT  15  /  AlkPhos  59  04-    PT/INR - ( 2022 13:20 )   PT: 29.8 sec;   INR: 2.52 ratio         PTT - ( 2022 12:19 )  PTT:39.9 sec  Urinalysis Basic - ( 2022 08:44 )    Color: Yellow / Appearance: Clear / S.020 / pH: x  Gluc: x / Ketone: Negative  / Bili: Negative / Urobili: Negative   Blood: x / Protein: 30 mg/dL / Nitrite: Negative   Leuk Esterase: Negative / RBC: 0-2 /HPF / WBC 0-2   Sq Epi: x / Non Sq Epi: Occasional / Bacteria: Occasional                  Procalcitonin, Serum: <0.05 (22 @ 08:03)      MICROBIOLOGY:  Culture Results:   No growth to date. ( @ 15:06)  Culture Results:   No growth to date. ( @ 15:06)      Assessment:    COPD with exacerbation  Parainfluenza Bronchitis  Doubt superimposed bacterial pneumonia (no definite infiltrate on Chest x ray)  Pancytopenia  Hx Pulmonary emboli - S/P IVC filter  IGG Subclass deficiency    Plan:    IV Solumedrol  IV Rocephin + Zithromax (as per ID)  Duoneb QID  Spiriva daily  Symbicort inhaler  Monitor pulse oximetry  Cover blood sugars

## 2022-04-21 NOTE — PROGRESS NOTE ADULT - SUBJECTIVE AND OBJECTIVE BOX
CHIEF COMPLAINT/INTERVAL HISTORY:  Pt. seen and evaluated for right upper lobe pneumonia.  Pt. is in no distress.  Reports having SOB when she has coughing fits but otherwise denies SOB while resting.  Tolerating IV antibiotics and steroids.  Had fever 101.6 yesterday.     REVIEW OF SYSTEMS:  +fever.  No CP, SOB, or abdominal pain    Vital Signs Last 24 Hrs  T(C): 36.9 (2022 05:08), Max: 38.7 (2022 13:06)  T(F): 98.4 (2022 05:08), Max: 101.6 (2022 13:06)  HR: 68 (2022 08:40) (62 - 87)  BP: 145/78 (2022 05:08) (97/53 - 155/68)  BP(mean): --  RR: 17 (2022 05:08) (17 - 21)  SpO2: 98% (2022 08:40) (96% - 99%)    PHYSICAL EXAM:  GENERAL: NAD  HEENT: EOMI, hearing normal, conjunctiva and sclera clear  Chest: Diminished BS at bases, no wheezing  CV: S1S2, RRR,   GI: soft, +BS, NT/ND  Musculoskeletal: no edema  Psychiatric: affect nL, mood nL  Skin: warm and dry    LABS:                        8.8    1.70  )-----------( 125      ( 2022 08:03 )             27.6     04-21    139  |  107  |  26<H>  ----------------------------<  161<H>  5.0   |  25  |  1.30    Ca    8.7      2022 08:03    TPro  6.6  /  Alb  3.1<L>  /  TBili  0.7  /  DBili  x   /  AST  15  /  ALT  15  /  AlkPhos  59  04-21    PT/INR - ( 2022 12:19 )   PT: 32.9 sec;   INR: 2.78 ratio         PTT - ( 2022 12:19 )  PTT:39.9 sec  Urinalysis Basic - ( 2022 08:44 )    Color: Yellow / Appearance: Clear / S.020 / pH: x  Gluc: x / Ketone: Negative  / Bili: Negative / Urobili: Negative   Blood: x / Protein: 30 mg/dL / Nitrite: Negative   Leuk Esterase: Negative / RBC: 0-2 /HPF / WBC 0-2   Sq Epi: x / Non Sq Epi: Occasional / Bacteria: Occasional

## 2022-04-21 NOTE — CONSULT NOTE ADULT - ASSESSMENT
History of anemia secondary to renal insufficiency and possible myelodysplasia on q 2 week retacrit as an outpatient now admitted with fever and diagnosed with parainfluenza infection.  Pancytopenia mildly worse than outpatient most likely related to acute illness.  Recieved 1 unit of PRBC    Recommcendations:  1.  follow CBC and transfuse as indicated  2.  continue medical/pulmonary followup  3.  further heme recommendations pending above  discussed patient, daughter and Dr. Lara

## 2022-04-21 NOTE — CONSULT NOTE ADULT - SUBJECTIVE AND OBJECTIVE BOX
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 States that for the past few days, she was not feeling too well and then developed SOB and increased cough. Yesterday felt worse and "out of it", does not remember going to the ED and being admitted. Some family members recently sick, and last antibiotic use was Z-milagros in February. Febrile to 102.9 in the ED, but feels better today. Daughter at bedside also thinks patient appears better today.      PAST MEDICAL & SURGICAL HISTORY:  COPD (chronic obstructive pulmonary disease)    DM (diabetes mellitus)    PAF (paroxysmal atrial fibrillation)  s/p ablation    Hiatal hernia    GIB (gastrointestinal bleeding)    Pericarditis    Shoulder fracture, right    Hematoma  Hematoma of Right wrist    S/P hysterectomy    S/P foot surgery    S/P knee surgery    After cataract  bilateral eye cataract surgically removed    Closed right hip fracture  rigth hip ORIF 2016    S/P IVC filter  2016    S/P carpal tunnel release  Right  &amp; 17        Social Hx:     FAMILY HISTORY:  Family history of bladder cancer (Mother)    Family history of myocardial infarction (Father)        Allergies    No Known Allergies    Intolerances        Antibiotics:  MEDICATIONS  (STANDING):  albuterol/ipratropium for Nebulization 3 milliLiter(s) Nebulizer every 6 hours  aMIOdarone    Tablet 100 milliGRAM(s) Oral daily  azithromycin  IVPB 500 milliGRAM(s) IV Intermittent every 24 hours  azithromycin  IVPB      budesonide  80 MICROgram(s)/formoterol 4.5 MICROgram(s) Inhaler 2 Puff(s) Inhalation two times a day  cefTRIAXone   IVPB      cefTRIAXone   IVPB 1000 milliGRAM(s) IV Intermittent every 24 hours  dextrose 5%. 1000 milliLiter(s) (100 mL/Hr) IV Continuous <Continuous>  dextrose 5%. 1000 milliLiter(s) (50 mL/Hr) IV Continuous <Continuous>  dextrose 50% Injectable 25 Gram(s) IV Push once  dextrose 50% Injectable 12.5 Gram(s) IV Push once  dextrose 50% Injectable 25 Gram(s) IV Push once  diltiazem    Tablet 30 milliGRAM(s) Oral two times a day  folic acid 1 milliGRAM(s) Oral daily  gabapentin 300 milliGRAM(s) Oral three times a day  glucagon  Injectable 1 milliGRAM(s) IntraMuscular once  insulin lispro (ADMELOG) corrective regimen sliding scale   SubCutaneous three times a day before meals  insulin lispro (ADMELOG) corrective regimen sliding scale   SubCutaneous at bedtime  methylPREDNISolone sodium succinate Injectable 40 milliGRAM(s) IV Push every 12 hours  montelukast 10 milliGRAM(s) Oral daily  pantoprazole    Tablet 40 milliGRAM(s) Oral before breakfast  simvastatin 10 milliGRAM(s) Oral at bedtime  tiotropium 18 MICROgram(s) Capsule 1 Capsule(s) Inhalation daily  traMADol 50 milliGRAM(s) Oral two times a day  warfarin 4 milliGRAM(s) Oral once    MEDICATIONS  (PRN):  acetaminophen     Tablet .. 650 milliGRAM(s) Oral every 6 hours PRN Temp greater or equal to 38C (100.4F), Mild Pain (1 - 3)  dextrose Oral Gel 15 Gram(s) Oral once PRN Blood Glucose LESS THAN 70 milliGRAM(s)/deciliter  dicyclomine 10 milliGRAM(s) Oral two times a day before meals PRN abd pain  guaiFENesin Oral Liquid (Sugar-Free) 200 milliGRAM(s) Oral every 6 hours PRN Cough       REVIEW OF SYSTEMS:  CONSTITUTIONAL: (+) fevers  HEENT: No sore throat or runny nose.  CARDIOVASCULAR:  (+) pleuritic chest pain  RESPIRATORY: see history  GASTROINTESTINAL:  No nausea, vomiting or diarrhea. no abdominal pain.  GENITOURINARY:  No dysuria, frequency or urgency  MUSCULOSKELETAL:  no joint aches  SKIN:  No rash, no pruritus  NEUROLOGIC:  No headache, no dizziness      Physical Exam:  Vital Signs Last 24 Hrs  T(C): 37.2 (2022 13:37), Max: 38.5 (2022 17:28)  T(F): 99 (2022 13:37), Max: 101.3 (2022 17:28)  HR: 74 (2022 13:37) (62 - 87)  BP: 146/74 (2022 13:37) (97/53 - 155/68)  BP(mean): --  RR: 17 (2022 13:37) (17 - 20)  SpO2: 98% (2022 13:37) (96% - 99%)    GEN: NAD  HEENT: normocephalic and atraumatic.   NECK: Supple.   LUNGS: Clear to auscultation.  HEART: coarse breath sounds  ABDOMEN: Soft, nontender, and nondistended.    EXTREMITIES: no leg edema.  NEUROLOGIC: answering questions appropriately  PSYCHIATRIC: Appropriate affect .    Labs:      139  |  107  |  26<H>  ----------------------------<  161<H>  5.0   |  25  |  1.30    Ca    8.7      2022 08:03    TPro  6.6  /  Alb  3.1<L>  /  TBili  0.7  /  DBili  x   /  AST  15  /  ALT  15  /  AlkPhos  59                            8.8    1.70  )-----------( 125      ( 2022 08:03 )             27.6     PT/INR - ( 2022 13:20 )   PT: 29.8 sec;   INR: 2.52 ratio         PTT - ( 2022 12:19 )  PTT:39.9 sec  Urinalysis Basic - ( 2022 08:44 )    Color: Yellow / Appearance: Clear / S.020 / pH: x  Gluc: x / Ketone: Negative  / Bili: Negative / Urobili: Negative   Blood: x / Protein: 30 mg/dL / Nitrite: Negative   Leuk Esterase: Negative / RBC: 0-2 /HPF / WBC 0-2   Sq Epi: x / Non Sq Epi: Occasional / Bacteria: Occasional      LIVER FUNCTIONS - ( 2022 08:03 )  Alb: 3.1 g/dL / Pro: 6.6 g/dL / ALK PHOS: 59 U/L / ALT: 15 U/L / AST: 15 U/L / GGT: x                 Procalcitonin, Serum: <0.05 (22 @ 08:03)        SARS-CoV-2: NotDetec (22 @ 12:17)      RECENT CULTURES:   @ 12:17          Detected        All imaging and other data have been reviewed.    CXR:  FINDINGS: Heart size appears within normal limits. No hilar or superior   mediastinal abnormalities are identified. A region of opacity is   identified overlying the right upper lung field at the level of the   anterior aspect of the right second rib, which may represent infiltrate   in light of provided clinical history of sepsis. There is no evidence for   pulmonary edema. No pleural effusion or pneumothorax is demonstrated. No   mediastinal shift is noted. The visualized osseous structures appear   unremarkable.    IMPRESSION: A region of opacity is identified overlying the right upper   lung field at the level of the anterior aspect of the right second rib,   which may represent infiltrate in light of provided clinical history of   sepsis. Clinical correlation and radiographic follow-up is recommended to   ensure clearing.

## 2022-04-22 ENCOUNTER — TRANSCRIPTION ENCOUNTER (OUTPATIENT)
Age: 82
End: 2022-04-22

## 2022-04-22 LAB
ANION GAP SERPL CALC-SCNC: 10 MMOL/L — SIGNIFICANT CHANGE UP (ref 5–17)
BASOPHILS # BLD AUTO: 0 K/UL — SIGNIFICANT CHANGE UP (ref 0–0.2)
BASOPHILS NFR BLD AUTO: 0 % — SIGNIFICANT CHANGE UP (ref 0–2)
BUN SERPL-MCNC: 35 MG/DL — HIGH (ref 7–23)
CALCIUM SERPL-MCNC: 8.8 MG/DL — SIGNIFICANT CHANGE UP (ref 8.5–10.1)
CHLORIDE SERPL-SCNC: 107 MMOL/L — SIGNIFICANT CHANGE UP (ref 96–108)
CO2 SERPL-SCNC: 23 MMOL/L — SIGNIFICANT CHANGE UP (ref 22–31)
CREAT SERPL-MCNC: 1.5 MG/DL — HIGH (ref 0.5–1.3)
EGFR: 35 ML/MIN/1.73M2 — LOW
EOSINOPHIL # BLD AUTO: 0 K/UL — SIGNIFICANT CHANGE UP (ref 0–0.5)
EOSINOPHIL NFR BLD AUTO: 0 % — SIGNIFICANT CHANGE UP (ref 0–6)
GLUCOSE SERPL-MCNC: 146 MG/DL — HIGH (ref 70–99)
HCT VFR BLD CALC: 29.3 % — LOW (ref 34.5–45)
HGB BLD-MCNC: 9.1 G/DL — LOW (ref 11.5–15.5)
IMM GRANULOCYTES NFR BLD AUTO: 5.6 % — HIGH (ref 0–1.5)
INR BLD: 2.79 RATIO — HIGH (ref 0.88–1.16)
LYMPHOCYTES # BLD AUTO: 0.49 K/UL — LOW (ref 1–3.3)
LYMPHOCYTES # BLD AUTO: 34.5 % — SIGNIFICANT CHANGE UP (ref 13–44)
MAGNESIUM SERPL-MCNC: 2.3 MG/DL — SIGNIFICANT CHANGE UP (ref 1.6–2.6)
MCHC RBC-ENTMCNC: 31.1 GM/DL — LOW (ref 32–36)
MCHC RBC-ENTMCNC: 31.6 PG — SIGNIFICANT CHANGE UP (ref 27–34)
MCV RBC AUTO: 101.7 FL — HIGH (ref 80–100)
MONOCYTES # BLD AUTO: 0.19 K/UL — SIGNIFICANT CHANGE UP (ref 0–0.9)
MONOCYTES NFR BLD AUTO: 13.4 % — SIGNIFICANT CHANGE UP (ref 2–14)
NEUTROPHILS # BLD AUTO: 0.66 K/UL — LOW (ref 1.8–7.4)
NEUTROPHILS NFR BLD AUTO: 46.5 % — SIGNIFICANT CHANGE UP (ref 43–77)
NRBC # BLD: 3 /100 WBCS — HIGH (ref 0–0)
PLATELET # BLD AUTO: 178 K/UL — SIGNIFICANT CHANGE UP (ref 150–400)
POTASSIUM SERPL-MCNC: 4.3 MMOL/L — SIGNIFICANT CHANGE UP (ref 3.5–5.3)
POTASSIUM SERPL-SCNC: 4.3 MMOL/L — SIGNIFICANT CHANGE UP (ref 3.5–5.3)
PROTHROM AB SERPL-ACNC: 33 SEC — HIGH (ref 10.5–13.4)
RBC # BLD: 2.88 M/UL — LOW (ref 3.8–5.2)
RBC # FLD: 22.2 % — HIGH (ref 10.3–14.5)
SODIUM SERPL-SCNC: 140 MMOL/L — SIGNIFICANT CHANGE UP (ref 135–145)
WBC # BLD: 1.42 K/UL — LOW (ref 3.8–10.5)
WBC # FLD AUTO: 1.42 K/UL — LOW (ref 3.8–10.5)

## 2022-04-22 PROCEDURE — 99232 SBSQ HOSP IP/OBS MODERATE 35: CPT

## 2022-04-22 RX ORDER — WARFARIN SODIUM 2.5 MG/1
2.5 TABLET ORAL ONCE
Refills: 0 | Status: COMPLETED | OUTPATIENT
Start: 2022-04-22 | End: 2022-04-22

## 2022-04-22 RX ADMIN — Medication 40 MILLIGRAM(S): at 17:16

## 2022-04-22 RX ADMIN — WARFARIN SODIUM 2.5 MILLIGRAM(S): 2.5 TABLET ORAL at 23:08

## 2022-04-22 RX ADMIN — BUDESONIDE AND FORMOTEROL FUMARATE DIHYDRATE 2 PUFF(S): 160; 4.5 AEROSOL RESPIRATORY (INHALATION) at 17:54

## 2022-04-22 RX ADMIN — Medication 3 MILLILITER(S): at 07:56

## 2022-04-22 RX ADMIN — Medication 1 MILLIGRAM(S): at 12:40

## 2022-04-22 RX ADMIN — Medication 40 MILLIGRAM(S): at 06:14

## 2022-04-22 RX ADMIN — Medication 3 MILLILITER(S): at 20:57

## 2022-04-22 RX ADMIN — GABAPENTIN 300 MILLIGRAM(S): 400 CAPSULE ORAL at 23:07

## 2022-04-22 RX ADMIN — GABAPENTIN 300 MILLIGRAM(S): 400 CAPSULE ORAL at 06:13

## 2022-04-22 RX ADMIN — TIOTROPIUM BROMIDE 1 CAPSULE(S): 18 CAPSULE ORAL; RESPIRATORY (INHALATION) at 08:24

## 2022-04-22 RX ADMIN — Medication 3 MILLILITER(S): at 13:52

## 2022-04-22 RX ADMIN — CEFTRIAXONE 100 MILLIGRAM(S): 500 INJECTION, POWDER, FOR SOLUTION INTRAMUSCULAR; INTRAVENOUS at 23:07

## 2022-04-22 RX ADMIN — GABAPENTIN 300 MILLIGRAM(S): 400 CAPSULE ORAL at 14:14

## 2022-04-22 RX ADMIN — Medication 30 MILLIGRAM(S): at 17:16

## 2022-04-22 RX ADMIN — TRAMADOL HYDROCHLORIDE 50 MILLIGRAM(S): 50 TABLET ORAL at 06:13

## 2022-04-22 RX ADMIN — TRAMADOL HYDROCHLORIDE 50 MILLIGRAM(S): 50 TABLET ORAL at 18:16

## 2022-04-22 RX ADMIN — TRAMADOL HYDROCHLORIDE 50 MILLIGRAM(S): 50 TABLET ORAL at 17:16

## 2022-04-22 RX ADMIN — Medication 30 MILLIGRAM(S): at 06:13

## 2022-04-22 RX ADMIN — MONTELUKAST 10 MILLIGRAM(S): 4 TABLET, CHEWABLE ORAL at 12:40

## 2022-04-22 RX ADMIN — SIMVASTATIN 10 MILLIGRAM(S): 20 TABLET, FILM COATED ORAL at 23:07

## 2022-04-22 RX ADMIN — AMIODARONE HYDROCHLORIDE 100 MILLIGRAM(S): 400 TABLET ORAL at 06:14

## 2022-04-22 RX ADMIN — BUDESONIDE AND FORMOTEROL FUMARATE DIHYDRATE 2 PUFF(S): 160; 4.5 AEROSOL RESPIRATORY (INHALATION) at 08:23

## 2022-04-22 RX ADMIN — PANTOPRAZOLE SODIUM 40 MILLIGRAM(S): 20 TABLET, DELAYED RELEASE ORAL at 06:14

## 2022-04-22 NOTE — DISCHARGE NOTE PROVIDER - CARE PROVIDER_API CALL
Rashid Stout)  Internal Medicine  [address] [place] [address]  Phone: [phone]  Fax: [phone]  Follow Up Time: 1 month    Renny Gallegos)  Internal Medicine; Pulmonary Disease  [address] [facility] [address]  [place] [address]  Phone: [phone]  Fax: [phone]  Follow Up Time: 2 weeks    Hari Parker)  Hematology; Internal Medicine; Medical Oncology  [address] [facility], [address] [place] [address]  Phone: [phone]  Fax: [phone]  Follow Up Time: 1 week

## 2022-04-22 NOTE — PHARMACOTHERAPY INTERVENTION NOTE - COMMENTS
Patient currently ordered for azithromycin 500 mg q24h for pna. Discussed with ID Dr. Velazco that legionella resulted negative and can discontinue azithromycin. MD agreed and order updated.

## 2022-04-22 NOTE — PROGRESS NOTE ADULT - SUBJECTIVE AND OBJECTIVE BOX
===[INTERVAL HX: ]===  Patient seen and examined;  Chart reviewed and events noted;     c/o cough    no CP, no SOB      ===[HEALTH ISSUES]===      HEALTH ISSUES - PROBLEM Dx:  Pneumonia    Anemia    COPD exacerbation    Chronic kidney disease, unspecified CKD stage    A-fib    Pancytopenia    Need for prophylactic measure    Type 2 diabetes mellitus    Heart failure                ===[MEDICATIONS]===  MEDICATIONS  (STANDING):  albuterol/ipratropium for Nebulization 3 milliLiter(s) Nebulizer every 6 hours  aMIOdarone    Tablet 100 milliGRAM(s) Oral daily  budesonide  80 MICROgram(s)/formoterol 4.5 MICROgram(s) Inhaler 2 Puff(s) Inhalation two times a day  cefTRIAXone   IVPB 1000 milliGRAM(s) IV Intermittent every 24 hours  cefTRIAXone   IVPB      dextrose 5%. 1000 milliLiter(s) (100 mL/Hr) IV Continuous <Continuous>  dextrose 5%. 1000 milliLiter(s) (50 mL/Hr) IV Continuous <Continuous>  dextrose 50% Injectable 25 Gram(s) IV Push once  dextrose 50% Injectable 12.5 Gram(s) IV Push once  dextrose 50% Injectable 25 Gram(s) IV Push once  diltiazem    Tablet 30 milliGRAM(s) Oral two times a day  folic acid 1 milliGRAM(s) Oral daily  gabapentin 300 milliGRAM(s) Oral three times a day  glucagon  Injectable 1 milliGRAM(s) IntraMuscular once  insulin lispro (ADMELOG) corrective regimen sliding scale   SubCutaneous three times a day before meals  insulin lispro (ADMELOG) corrective regimen sliding scale   SubCutaneous at bedtime  methylPREDNISolone sodium succinate Injectable 40 milliGRAM(s) IV Push every 12 hours  montelukast 10 milliGRAM(s) Oral daily  pantoprazole    Tablet 40 milliGRAM(s) Oral before breakfast  simvastatin 10 milliGRAM(s) Oral at bedtime  tiotropium 18 MICROgram(s) Capsule 1 Capsule(s) Inhalation daily  traMADol 50 milliGRAM(s) Oral two times a day  warfarin 2.5 milliGRAM(s) Oral once    MEDICATIONS  (PRN):  acetaminophen     Tablet .. 650 milliGRAM(s) Oral every 6 hours PRN Temp greater or equal to 38C (100.4F), Mild Pain (1 - 3)  dextrose Oral Gel 15 Gram(s) Oral once PRN Blood Glucose LESS THAN 70 milliGRAM(s)/deciliter  dicyclomine 10 milliGRAM(s) Oral two times a day before meals PRN abd pain  guaiFENesin Oral Liquid (Sugar-Free) 200 milliGRAM(s) Oral every 6 hours PRN Cough      ===[VITALS]===  Vital Signs Last 24 Hrs  T(C): 36.2 (2022 14:20), Max: 37.1 (2022 17:20)  T(F): 97.1 (2022 14:20), Max: 98.7 (2022 17:20)  HR: 74 (2022 14:20) (61 - 74)  BP: 127/57 (2022 14:20) (105/63 - 147/75)  BP(mean): --  RR: 18 (2022 14:20) (17 - 18)  SpO2: 97% (2022 14:20) (97% - 99%)  [WT/HT]  Daily     Daily   [VENT]    ===[PHYSICAL EXAM]===  General: WN,  WD adult in NAD.  HEENT:  anicteric sclera, pink conjunctivae,   Neck: supple, no masses.  CV: normal S1S2, no murmur, no rubs, no gallops.  Lungs: clear to auscultation, no wheezes, no rales, no rhonchi.  Abdomen: soft, non-tender, non-distended, no hepatosplenomegaly, normal BS, no guarding.  Ext: no clubbing, no cyanosis, no edema.  Skin: no rashes,  no petechiae, no venous stasis changes.  Neuro: alert and oriented X 3, no focal motor deficits.  LN: no SC LILLIAN.        ===[LABS:]===                        9.1    1.42  )-----------( 178      ( 2022 11:11 )             29.3     04    140  |  107  |  35<H>  ----------------------------<  146<H>  4.3   |  23  |  1.50<H>    Ca    8.8      2022 11:11  Mg     2.3         TPro  6.6  /  Alb  3.1<L>  /  TBili  0.7  /  DBili  x   /  AST  15  /  ALT  15  /  AlkPhos  59  -    PT/INR - ( 2022 11:11 )   PT: 33.0 sec;   INR: 2.79 ratio         Rapid RVP Result: Detected (22 @ 12:17)   Parainfluenza 3 (RapRVP): Detected (22 @ 12:17)       Urinalysis Basic - ( 2022 08:44 )  Color: Yellow / Appearance: Clear / S.020 / pH: x  Gluc: x / Ketone: Negative  / Bili: Negative / Urobili: Negative   Blood: x / Protein: 30 mg/dL / Nitrite: Negative   Leuk Esterase: Negative / RBC: 0-2 /HPF / WBC 0-2   Sq Epi: x / Non Sq Epi: Occasional / Bacteria: Occasional        Culture - Blood (collected 2022 15:06)  Source: .Blood Blood-Peripheral  Preliminary Report (2022 16:01):    No growth to date.    Culture - Blood (collected 2022 15:06)  Source: .Blood Blood-Peripheral  Preliminary Report (2022 16:01):    No growth to date.      SARS-CoV-2: NotDetec (2022 12:17)        Culture - Blood (collected 2022 15:06)  Source: .Blood Blood-Peripheral  Preliminary Report (2022 16:01):    No growth to date.    Culture - Blood (collected 2022 15:06)  Source: .Blood Blood-Peripheral  Preliminary Report (2022 16:01):    No growth to date.            ===[RADIOLOGY STUDIES:]===

## 2022-04-22 NOTE — PROGRESS NOTE ADULT - SUBJECTIVE AND OBJECTIVE BOX
[facility]  INFECTIOUS DISEASES - Emma [address]nel[address], [place] [address]  Tel: [phone]     Fax: [phone]  =======================================================    MARTINEZ JONES [id number]    Follow up: No fevers. Feels a little better overall. Still with cough but not producing sputum, unable to provide specimen for culture. Denies any nausea or diarrhea.    Allergies:  No Known Allergies      Antibiotics:  acetaminophen     Tablet .. 650 milliGRAM(s) Oral every 6 hours PRN  albuterol/ipratropium for Nebulization 3 milliLiter(s) Nebulizer every 6 hours  aMIOdarone    Tablet 100 milliGRAM(s) Oral daily  budesonide  80 MICROgram(s)/formoterol 4.5 MICROgram(s) Inhaler 2 Puff(s) Inhalation two times a day  cefTRIAXone   IVPB 1000 milliGRAM(s) IV Intermittent every 24 hours  cefTRIAXone   IVPB      dextrose 5%. 1000 milliLiter(s) IV Continuous <Continuous>  dextrose 5%. 1000 milliLiter(s) IV Continuous <Continuous>  dextrose 50% Injectable 25 Gram(s) IV Push once  dextrose 50% Injectable 12.5 Gram(s) IV Push once  dextrose 50% Injectable 25 Gram(s) IV Push once  dextrose Oral Gel 15 Gram(s) Oral once PRN  dicyclomine 10 milliGRAM(s) Oral two times a day before meals PRN  diltiazem    Tablet 30 milliGRAM(s) Oral two times a day  folic acid 1 milliGRAM(s) Oral daily  gabapentin 300 milliGRAM(s) Oral three times a day  glucagon  Injectable 1 milliGRAM(s) IntraMuscular once  guaiFENesin Oral Liquid (Sugar-Free) 200 milliGRAM(s) Oral every 6 hours PRN  insulin lispro (ADMELOG) corrective regimen sliding scale   SubCutaneous three times a day before meals  insulin lispro (ADMELOG) corrective regimen sliding scale   SubCutaneous at bedtime  methylPREDNISolone sodium succinate Injectable 40 milliGRAM(s) IV Push every 12 hours  montelukast 10 milliGRAM(s) Oral daily  pantoprazole    Tablet 40 milliGRAM(s) Oral before breakfast  simvastatin 10 milliGRAM(s) Oral at bedtime  tiotropium 18 MICROgram(s) Capsule 1 Capsule(s) Inhalation daily  traMADol 50 milliGRAM(s) Oral two times a day  warfarin 2.5 milliGRAM(s) Oral once       REVIEW OF SYSTEMS:  CONSTITUTIONAL: (+) fevers  HEENT: No sore throat or runny nose.  CARDIOVASCULAR:  (+) pleuritic chest pain  RESPIRATORY: see history  GASTROINTESTINAL:  No nausea, vomiting or diarrhea. no abdominal pain.  GENITOURINARY:  No dysuria, frequency or urgency  MUSCULOSKELETAL:  no joint aches  SKIN:  No rash, no pruritus  NEUROLOGIC:  No headache, no dizziness     Physical Exam:  ICU Vital Signs Last 24 Hrs  T(C): 36.2 (2022 14:20), Max: 37 (2022 20:26)  T(F): 97.1 (2022 14:20), Max: 98.6 (2022 20:26)  HR: 67 (2022 16:46) (61 - 74)  BP: 124/66 (2022 16:46) (105/63 - 147/75)  BP(mean): --  ABP: --  ABP(mean): --  RR: 18 (2022 14:20) (18 - 18)  SpO2: 97% (2022 14:20) (97% - 99%)     GEN: NAD  HEENT: normocephalic and atraumatic.   NECK: Supple.   LUNGS: Normal respiratory effort  HEART: regular rate and rhythm  ABDOMEN: Soft, nontender, and nondistended.    EXTREMITIES: no leg edema.  NEUROLOGIC: AO X 3, answering questions appropriately  PSYCHIATRIC: Appropriate affect .      Labs:      140  |  107  |  35<H>  ----------------------------<  146<H>  4.3   |  23  |  1.50<H>    Ca    8.8      2022 11:11  Mg     2.3         TPro  6.6  /  Alb  3.1<L>  /  TBili  0.7  /  DBili  x   /  AST  15  /  ALT  15  /  AlkPhos  59                            9.1    1.42  )-----------( 178      ( 2022 11:11 )             29.3     PT/INR - ( 2022 11:11 )   PT: 33.0 sec;   INR: 2.79 ratio           Urinalysis Basic - ( 2022 08:44 )    Color: Yellow / Appearance: Clear / S.020 / pH: x  Gluc: x / Ketone: Negative  / Bili: Negative / Urobili: Negative   Blood: x / Protein: 30 mg/dL / Nitrite: Negative   Leuk Esterase: Negative / RBC: 0-2 /HPF / WBC 0-2   Sq Epi: x / Non Sq Epi: Occasional / Bacteria: Occasional      LIVER FUNCTIONS - ( 2022 08:03 )  Alb: 3.1 g/dL / Pro: 6.6 g/dL / ALK PHOS: 59 U/L / ALT: 15 U/L / AST: 15 U/L / GGT: x             RECENT CULTURES:   @ 15:06 .Blood Blood-Peripheral     No growth to date.         @ 12:17          Detected        All imaging and data are reviewed.

## 2022-04-22 NOTE — DISCHARGE NOTE PROVIDER - PROVIDER TOKENS
PROVIDER:[TOKEN:[[id number]],FOLLOWUP:[1 month]],PROVIDER:[TOKEN:[[id number]],FOLLOWUP:[2 weeks]],PROVIDER:[TOKEN:[[id number]],FOLLOWUP:[1 week]]

## 2022-04-22 NOTE — PROGRESS NOTE ADULT - SUBJECTIVE AND OBJECTIVE BOX
Patient is a 81y old  Female who presents with a chief complaint of superimposed bacterial PNA and COPD exacerbation (2022 19:12)      INTERVAL HPI/OVERNIGHT EVENTS:    admits to cough, wheezing and shortness of breath    MEDICATIONS  (STANDING):  albuterol/ipratropium for Nebulization 3 milliLiter(s) Nebulizer every 6 hours  aMIOdarone    Tablet 100 milliGRAM(s) Oral daily  budesonide  80 MICROgram(s)/formoterol 4.5 MICROgram(s) Inhaler 2 Puff(s) Inhalation two times a day  cefTRIAXone   IVPB 1000 milliGRAM(s) IV Intermittent every 24 hours  cefTRIAXone   IVPB      dextrose 5%. 1000 milliLiter(s) (100 mL/Hr) IV Continuous <Continuous>  dextrose 5%. 1000 milliLiter(s) (50 mL/Hr) IV Continuous <Continuous>  dextrose 50% Injectable 25 Gram(s) IV Push once  dextrose 50% Injectable 12.5 Gram(s) IV Push once  dextrose 50% Injectable 25 Gram(s) IV Push once  diltiazem    Tablet 30 milliGRAM(s) Oral two times a day  folic acid 1 milliGRAM(s) Oral daily  gabapentin 300 milliGRAM(s) Oral three times a day  glucagon  Injectable 1 milliGRAM(s) IntraMuscular once  insulin lispro (ADMELOG) corrective regimen sliding scale   SubCutaneous three times a day before meals  insulin lispro (ADMELOG) corrective regimen sliding scale   SubCutaneous at bedtime  methylPREDNISolone sodium succinate Injectable 40 milliGRAM(s) IV Push every 12 hours  montelukast 10 milliGRAM(s) Oral daily  pantoprazole    Tablet 40 milliGRAM(s) Oral before breakfast  simvastatin 10 milliGRAM(s) Oral at bedtime  tiotropium 18 MICROgram(s) Capsule 1 Capsule(s) Inhalation daily  traMADol 50 milliGRAM(s) Oral two times a day  warfarin 2.5 milliGRAM(s) Oral once      MEDICATIONS  (PRN):  acetaminophen     Tablet .. 650 milliGRAM(s) Oral every 6 hours PRN Temp greater or equal to 38C (100.4F), Mild Pain (1 - 3)  dextrose Oral Gel 15 Gram(s) Oral once PRN Blood Glucose LESS THAN 70 milliGRAM(s)/deciliter  dicyclomine 10 milliGRAM(s) Oral two times a day before meals PRN abd pain  guaiFENesin Oral Liquid (Sugar-Free) 200 milliGRAM(s) Oral every 6 hours PRN Cough      Allergies    No Known Allergies    Intolerances        PAST MEDICAL & SURGICAL HISTORY:  COPD (chronic obstructive pulmonary disease)    DM (diabetes mellitus)    PAF (paroxysmal atrial fibrillation)  s/p ablation    Hiatal hernia    GIB (gastrointestinal bleeding)    Pericarditis    Shoulder fracture, right    Hematoma  Hematoma of Right wrist    S/P hysterectomy    S/P foot surgery    S/P knee surgery    After cataract  bilateral eye cataract surgically removed    Closed right hip fracture  rigth hip ORIF 2016    S/P IVC filter  2016    S/P carpal tunnel release  Right  &amp; 17        Vital Signs Last 24 Hrs  T(C): 36.2 (2022 14:20), Max: 37 (2022 20:26)  T(F): 97.1 (2022 14:20), Max: 98.6 (2022 20:26)  HR: 67 (2022 16:46) (61 - 74)  BP: 124/66 (2022 16:46) (105/63 - 147/75)  BP(mean): --  RR: 18 (2022 14:20) (18 - 18)  SpO2: 97% (2022 14:20) (97% - 99%)    PHYSICAL EXAMINATION:    GENERAL: The patient is awake and alert in no apparent distress.     HEENT: Head is normocephalic and atraumatic. Extraocular muscles are intact. Mucous membranes are moist.    NECK: Supple.    LUNGS: bilateral expiratory wheezes    HEART: Regular rate and rhythm without murmur.    ABDOMEN: Soft, nontender, and nondistended.      EXTREMITIES: Without any cyanosis, clubbing, rash, lesions or edema.    NEUROLOGIC: Grossly intact.    SKIN: No ulceration or induration present.      LABS:                        9.1    1.42  )-----------( 178      ( 2022 11:11 )             29.3     04-    140  |  107  |  35<H>  ----------------------------<  146<H>  4.3   |  23  |  1.50<H>    Ca    8.8      2022 11:11  Mg     2.3         TPro  6.6  /  Alb  3.1<L>  /  TBili  0.7  /  DBili  x   /  AST  15  /  ALT  15  /  AlkPhos  59  -    PT/INR - ( 2022 11:11 )   PT: 33.0 sec;   INR: 2.79 ratio           Urinalysis Basic - ( 2022 08:44 )    Color: Yellow / Appearance: Clear / S.020 / pH: x  Gluc: x / Ketone: Negative  / Bili: Negative / Urobili: Negative   Blood: x / Protein: 30 mg/dL / Nitrite: Negative   Leuk Esterase: Negative / RBC: 0-2 /HPF / WBC 0-2   Sq Epi: x / Non Sq Epi: Occasional / Bacteria: Occasional                  Procalcitonin, Serum: <0.05 (22 @ 08:03)      MICROBIOLOGY:  Culture Results:   No growth to date. ( @ 15:06)  Culture Results:   No growth to date. ( @ 15:06)          Assessment:    COPD with exacerbation  Parainfluenza Bronchitis  Acute Hypoxic respiratory failure  Doubt superimposed bacterial infection  Pancytopenia  PAF  Diabetes type 2    Plan:    Continue Solumedrol  Supplemental oxygen  Symbicort + Spiriva inhalers  Amiodarone 100 mg daily  Anticoagulation with Coumadin  Consider stopping antibiotics

## 2022-04-22 NOTE — DISCHARGE NOTE PROVIDER - HOSPITAL COURSE
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 Pt's daughter at bedside. States that for the past few days, pt has been dealing with a respiratory infection and low grade fever. However, symptoms persisted so yesterday, patient was started on antibiotics. This morning, pt was noted to be confused with a temp of 104F. Daughter called Dr. Gallegos (pulm) and was told to bring pt to the ED. Pt reports that she still has mild dyspnea. Unable to speak in full sentences at the moment. Pt was scheduled to have a blood transfusion tomorrow. Heme/onc called by ED and was attempted to be given blood transfusion in ER but patient spiked a high fever and transfusion was stopped. Of note, pt has been receiving IVIG infusions. s/p IVIG on March 18th and next scheduled infusion was 4/25. NO sick contacts.    In the ED, VS T102.9F HR 98 /76 RR 30 SpO2 98% on RA. Labs significant for leukopenia 2.29, anemia 8.5/26.2, thrombocytopenia 106, BUN/Cr 25/1.5, RVP parainfluenza positive.  Received Zosyn x1, 1.7L NS bolus x1, tylenol 975 mg PO x2, duoneb x1 in the ED.  CXR showing RUL PNA  EKG NSR with nonspecific ST abnormality unchanged from prior EKG    Pt. was admitted with Pulmonary, ID, and Heme/Onc consultation.  She was continued on IV antibiotics and steroids.  She had received PRBC transfusion and hemoglobin has remained stable.  Blood cultures showed no growth.  Urine legionella and strep Ag were negative. 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 Pt's daughter at bedside. States that for the past few days, pt has been dealing with a respiratory infection and low grade fever. However, symptoms persisted so yesterday, patient was started on antibiotics. This morning, pt was noted to be confused with a temp of 104F. Daughter called Dr. Gallegos (pulm) and was told to bring pt to the ED. Pt reports that she still has mild dyspnea. Unable to speak in full sentences at the moment. Pt was scheduled to have a blood transfusion tomorrow. Heme/onc called by ED and was attempted to be given blood transfusion in ER but patient spiked a high fever and transfusion was stopped. Of note, pt has been receiving IVIG infusions. s/p IVIG on March 18th and next scheduled infusion was 4/25. NO sick contacts.    In the ED, VS T102.9F HR 98 /76 RR 30 SpO2 98% on RA. Labs significant for leukopenia 2.29, anemia 8.5/26.2, thrombocytopenia 106, BUN/Cr 25/1.5, RVP parainfluenza positive.  Received Zosyn x1, 1.7L NS bolus x1, tylenol 975 mg PO x2, duoneb x1 in the ED.  CXR showing RUL PNA  EKG NSR with nonspecific ST abnormality unchanged from prior EKG    Pt. was admitted with Pulmonary, ID, and Heme/Onc consultation.  She was continued on IV antibiotics and steroids.  She had received PRBC transfusion and hemoglobin has remained stable.  Blood cultures showed no growth.  Urine legionella and strep Ag were negative.  She was supplied supplemental O2 via nasal canula. 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 Pt's daughter at bedside. States that for the past few days, pt has been dealing with a respiratory infection and low grade fever. However, symptoms persisted so yesterday, patient was started on antibiotics. This morning, pt was noted to be confused with a temp of 104F. Daughter called Dr. Gallegos (pulm) and was told to bring pt to the ED. Pt reports that she still has mild dyspnea. Unable to speak in full sentences at the moment. Pt was scheduled to have a blood transfusion tomorrow. Heme/onc called by ED and was attempted to be given blood transfusion in ER but patient spiked a high fever and transfusion was stopped. Of note, pt has been receiving IVIG infusions. s/p IVIG on March 18th and next scheduled infusion was 4/25. NO sick contacts.    In the ED, VS T102.9F HR 98 /76 RR 30 SpO2 98% on RA. Labs significant for leukopenia 2.29, anemia 8.5/26.2, thrombocytopenia 106, BUN/Cr 25/1.5, RVP parainfluenza positive.  Received Zosyn x1, 1.7L NS bolus x1, tylenol 975 mg PO x2, duoneb x1 in the ED.  CXR showing RUL PNA  EKG NSR with nonspecific ST abnormality unchanged from prior EKG    Pt. was admitted with Pulmonary, ID, and Heme/Onc consultation.  She was continued on IV antibiotics and steroids for parainfluenza infection with suspected superimposed PNA, and completed a course fo IV ceftriaxone on 4/26.  Blood cultures showed no growth.  Urine legionella and strep Ag were negative.  She was supplied supplemental O2 via nasal canula.  Her Hb was, and care was coordinated with heme-onc, she was ordered for PRBC transfusion.  She was to follow up with PMD, Pulm, and Heme-Onc in outpatient setting.    Vital Signs Last 24 Hrs  T(C): 37.2 (27 Apr 2022 14:00), Max: 37.2 (27 Apr 2022 14:00)  T(F): 98.9 (27 Apr 2022 14:00), Max: 98.9 (27 Apr 2022 14:00)  HR: 71 (27 Apr 2022 14:15) (66 - 79)  BP: 163/80 (27 Apr 2022 14:00) (136/76 - 163/80)  BP(mean): --  RR: 18 (27 Apr 2022 14:00) (18 - 19)  SpO2: 91% (27 Apr 2022 14:15) (91% - 95%)     PEX -   Gen - NAD  HEENT - perrla, eomi  CV - s1 and s2, rrr, no m/r/g  LUNGS - CTAB  Abd - soft, nd, nt, +bs  Ext - no c/c/e  Neuro - no focal deficits 80 yo F with PMHx of COPD (not on home O2), pAF (s/p cardiac ablation on coumadin), T2DM, hip necrosis, aplastic anemia (possible MDS), ?immunodeficiency (recently 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 Pt's daughter at bedside. States that for the past few days, pt has been dealing with a respiratory infection and low grade fever. However, symptoms persisted so yesterday, patient was started on antibiotics. This morning, pt was noted to be confused with a temp of 104F. Daughter called Dr. Gallegos (pulm) and was told to bring pt to the ED. Pt reports that she still has mild dyspnea. Unable to speak in full sentences at the moment. Pt was scheduled to have a blood transfusion tomorrow. Heme/onc called by ED and was attempted to be given blood transfusion in ER but patient spiked a high fever and transfusion was stopped. Of note, pt has been receiving IVIG infusions. s/p IVIG on March 18th and next scheduled infusion was 4/25. NO sick contacts.    In the ED, VS T102.9F HR 98 /76 RR 30 SpO2 98% on RA. Labs significant for leukopenia 2.29, anemia 8.5/26.2, thrombocytopenia 106, BUN/Cr 25/1.5, RVP parainfluenza positive.  Received Zosyn x1, 1.7L NS bolus x1, tylenol 975 mg PO x2, duoneb x1 in the ED.  CXR showing RUL PNA  EKG NSR with nonspecific ST abnormality unchanged from prior EKG    Pt. was admitted with Pulmonary, ID, and Heme/Onc consultation.  She was continued on IV antibiotics and steroids for parainfluenza infection with suspected superimposed PNA, and completed a course fo IV ceftriaxone on 4/26., and complete a steroid taper into the outpatient setting.   Blood cultures showed no growth.  Urine legionella and strep Ag were negative.  She was supplied supplemental O2 via nasal canula.  Her Hb was, and care was coordinated with heme-onc, she was ordered for PRBC transfusion.  She was to follow up with PMD, Pulm, and Heme-Onc in outpatient setting.     Vital Signs Last 24 Hrs  T(C): 37.2 (27 Apr 2022 14:00), Max: 37.2 (27 Apr 2022 14:00)  T(F): 98.9 (27 Apr 2022 14:00), Max: 98.9 (27 Apr 2022 14:00)  HR: 71 (27 Apr 2022 14:15) (66 - 79)  BP: 163/80 (27 Apr 2022 14:00) (136/76 - 163/80)  BP(mean): --  RR: 18 (27 Apr 2022 14:00) (18 - 19)  SpO2: 91% (27 Apr 2022 14:15) (91% - 95%)     PEX -   Gen - NAD  HEENT - perrla, eomi  CV - s1 and s2, rrr, no m/r/g  LUNGS - CTAB  Abd - soft, nd, nt, +bs  Ext - no c/c/e  Neuro - no focal deficits

## 2022-04-22 NOTE — DISCHARGE NOTE PROVIDER - NSDCFUSCHEDAPPT_GEN_ALL_CORE_FT
MARTINEZ JONES ; 05/10/2022 ; [facility]  MARTINEZ JONES ; 06/29/2022 ; [facility] [facility] Phys[facility]  [facility]  Scheduled Appointment: 05/10/2022    Rashid Stout  [facility] Physician [facility]  [facility]  Scheduled Appointment: 06/29/2022

## 2022-04-22 NOTE — CONSULT NOTE ADULT - SUBJECTIVE AND OBJECTIVE BOX
Patient is a 81y old  Female who presents with a chief complaint of superimposed bacterial PNA and COPD exacerbation (2022 11:36)    HPI:  82 yo F with PMHx of COPD (not on home O2), pAF (s/p cardiac ablation on coumadin), T2DM, hip necrosis, aplastic anemia (possible MDS), ?immunodeficiency (recently 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 Pt's daughter at bedside. States that for the past few days, pt has been dealing with a respiratory infection and low grade fever. However, symptoms persisted so yesterday, patient was started on antibiotics. This morning, pt was noted to be confused with a temp of 104F. Daughter called Dr. Gallegos (pulm) and was told to bring pt to the ED. Pt reports that she still has mild dyspnea. Unable to speak in full sentences at the moment. Pt was scheduled to have a blood transfusion tomorrow. Heme/onc called by ED and was attempted to be given blood transfusion in ER but patient spiked a high fever and transfusion was stopped. Of note, pt has been receiving IVIG infusions. s/p IVIG on  and next scheduled infusion was . NO sick contacts.    In the ED, VS T102.9F HR 98 /76 RR 30 SpO2 98% on RA. Labs significant for leukopenia 2.29, anemia 8.5/26.2, thrombocytopenia 106, BUN/Cr 25/1.5, RVP parainfluenza positive.  Received Zosyn x1, 1.7L NS bolus x1, tylenol 975 mg PO x2, duoneb x1 in the ED.  CXR showing RUL PNA  EKG NSR with nonspecific ST abnormality unchanged from prior EKG (2022 20:01)      PAST MEDICAL HISTORY:  COPD (chronic obstructive pulmonary disease)    DM (diabetes mellitus)    Pulmonary fibrosis    PAF (paroxysmal atrial fibrillation)    Hiatal hernia    GIB (gastrointestinal bleeding)    Pericarditis    Shoulder fracture, right    Hematoma        PAST SURGICAL HISTORY:  S/P hysterectomy    S/P foot surgery    S/P knee surgery    After cataract    Closed right hip fracture    S/P IVC filter    S/P carpal tunnel release        FAMILY HISTORY:  Family history of bladder cancer (Mother)    Family history of myocardial infarction (Father)        SOCIAL HISTORY:    Allergies    No Known Allergies    Intolerances      Home Medications:  amiodarone 100 mg oral tablet: 1 tab(s) orally once a day (2022 21:15)  Bentyl 10 mg oral capsule: 1 cap(s) orally 2 times a day, As Needed (2022 21:15)  Cardizem: 25 milligram(s) orally 2 times a day (2022 21:15)  Coumadin 4 mg oral tablet: 1 tab(s) orally once a day  4mg 4x week; 6mg 3x week (2022 21:15)  folic acid 1 mg oral tablet: 1 tab(s) orally once a day (2022 21:15)  gabapentin 400 mg oral capsule: 1 cap(s) orally 3 times a day (2022 21:15)  NexIUM: 15 milligram(s) orally once a day (2022 21:15)  Procrit 10,000 units/mL preservative-free injectable solution: injectable every other week (2022 21:15)  simvastatin 10 mg oral tablet: 1 tab(s) orally once a day (at bedtime) (2022 21:15)  Singulair 10 mg oral tablet: 1 tab(s) orally once a day (2022 21:15)  traMADol 50 mg oral tablet: 1 tab(s) orally 2 times a day (2022 21:15)  Trelegy Ellipta: inhaled once a day (2022 21:15)  Vitamin D3 25 mcg (1000 intl units) oral capsule: 1000 milligram(s) orally 3 times a week (2022 21:15)    MEDICATIONS  (STANDING):  albuterol/ipratropium for Nebulization 3 milliLiter(s) Nebulizer every 6 hours  aMIOdarone    Tablet 100 milliGRAM(s) Oral daily  azithromycin  IVPB 500 milliGRAM(s) IV Intermittent every 24 hours  azithromycin  IVPB      budesonide  80 MICROgram(s)/formoterol 4.5 MICROgram(s) Inhaler 2 Puff(s) Inhalation two times a day  cefTRIAXone   IVPB 1000 milliGRAM(s) IV Intermittent every 24 hours  cefTRIAXone   IVPB      dextrose 5%. 1000 milliLiter(s) (50 mL/Hr) IV Continuous <Continuous>  dextrose 5%. 1000 milliLiter(s) (100 mL/Hr) IV Continuous <Continuous>  dextrose 50% Injectable 25 Gram(s) IV Push once  dextrose 50% Injectable 12.5 Gram(s) IV Push once  dextrose 50% Injectable 25 Gram(s) IV Push once  diltiazem    Tablet 30 milliGRAM(s) Oral two times a day  folic acid 1 milliGRAM(s) Oral daily  gabapentin 300 milliGRAM(s) Oral three times a day  glucagon  Injectable 1 milliGRAM(s) IntraMuscular once  insulin lispro (ADMELOG) corrective regimen sliding scale   SubCutaneous three times a day before meals  insulin lispro (ADMELOG) corrective regimen sliding scale   SubCutaneous at bedtime  methylPREDNISolone sodium succinate Injectable 40 milliGRAM(s) IV Push every 12 hours  montelukast 10 milliGRAM(s) Oral daily  pantoprazole    Tablet 40 milliGRAM(s) Oral before breakfast  simvastatin 10 milliGRAM(s) Oral at bedtime  tiotropium 18 MICROgram(s) Capsule 1 Capsule(s) Inhalation daily  traMADol 50 milliGRAM(s) Oral two times a day  warfarin 2.5 milliGRAM(s) Oral once    MEDICATIONS  (PRN):  acetaminophen     Tablet .. 650 milliGRAM(s) Oral every 6 hours PRN Temp greater or equal to 38C (100.4F), Mild Pain (1 - 3)  dextrose Oral Gel 15 Gram(s) Oral once PRN Blood Glucose LESS THAN 70 milliGRAM(s)/deciliter  dicyclomine 10 milliGRAM(s) Oral two times a day before meals PRN abd pain  guaiFENesin Oral Liquid (Sugar-Free) 200 milliGRAM(s) Oral every 6 hours PRN Cough      REVIEW OF SYSTEMS:  General:   Respiratory: No cough, SOB  Cardiovascular: No CP or Palpitations	  Gastrointestinal: No nausea, Vomiting. No diarrhea  Genitourinary: No urinary complaints	  Musculoskeletal: No leg swelling, No new rash or lesions	  Neurological: 	  all other systems negative    T(F): 97.7 (22 @ 04:26), Max: 98.7 (22 @ 17:20)  HR: 68 (22 @ 14:03) (61 - 73)  BP: 147/75 (22 @ 04:26) (105/63 - 147/75)  RR: 18 (22 @ 04:26) (17 - 18)  SpO2: 99% (22 @ 14:03) (98% - 99%)  Wt(kg): --    PHYSICAL EXAM:  General: NAD  Respiratory: b/l air entry  Cardiovascular: S1 S2  Gastrointestinal: soft  Extremities: edema            140  |  107  |  35<H>  ----------------------------<  146<H>  4.3   |  23  |  1.50<H>    Ca    8.8      2022 11:11  Mg     2.3         TPro  6.6  /  Alb  3.1<L>  /  TBili  0.7  /  DBili  x   /  AST  15  /  ALT  15  /  AlkPhos  59                            9.1    1.42  )-----------( 178      ( 2022 11:11 )             29.3       Potassium, Serum: 4.3 mmol/L ( @ 11:11)  Blood Urea Nitrogen, Serum: 35 mg/dL ( @ 11:11)  Calcium, Total Serum: 8.8 mg/dL ( @ 11:11)  Hemoglobin: 9.1 g/dL ( @ 11:11)      Creatinine, Serum: 1.50 ( @ 11:11)  Creatinine, Serum: 1.30 ( @ 08:03)  Creatinine, Serum: 1.50 ( @ 12:19)      Urinalysis Basic - ( 2022 08:44 )    Color: Yellow / Appearance: Clear / S.020 / pH: x  Gluc: x / Ketone: Negative  / Bili: Negative / Urobili: Negative   Blood: x / Protein: 30 mg/dL / Nitrite: Negative   Leuk Esterase: Negative / RBC: 0-2 /HPF / WBC 0-2   Sq Epi: x / Non Sq Epi: Occasional / Bacteria: Occasional      LIVER FUNCTIONS - ( 2022 08:03 )  Alb: 3.1 g/dL / Pro: 6.6 g/dL / ALK PHOS: 59 U/L / ALT: 15 U/L / AST: 15 U/L / GGT: x                       I&O's Detail    2022 07:01  -  2022 07:00  --------------------------------------------------------  IN:    Oral Fluid: 360 mL  Total IN: 360 mL    OUT:    Voided (mL): 700 mL  Total OUT: 700 mL    Total NET: -340 mL            Culture - Blood (collected 2022 15:06)  Source: .Blood Blood-Peripheral  Preliminary Report (2022 16:01):    No growth to date.    Culture - Blood (collected 2022 15:06)  Source: .Blood Blood-Peripheral  Preliminary Report (2022 16:01):    No growth to date.       Patient is a 81y old  Female who presents with a chief complaint of superimposed bacterial PNA and COPD exacerbation (2022 11:36)    HPI:  80 yo F with PMHx of COPD (not on home O2), pAF (s/p cardiac ablation on coumadin), T2DM, hip necrosis, aplastic anemia (possible MDS), ?immunodeficiency (recently 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 Pt's daughter at bedside. States that for the past few days, pt has been dealing with a respiratory infection and low grade fever. However, symptoms persisted so yesterday, patient was started on antibiotics. This morning, pt was noted to be confused with a temp of 104F. Daughter called Dr. Gallegos (pulm) and was told to bring pt to the ED. Pt reports that she still has mild dyspnea. Unable to speak in full sentences at the moment. Pt was scheduled to have a blood transfusion tomorrow. Heme/onc called by ED and was attempted to be given blood transfusion in ER but patient spiked a high fever and transfusion was stopped. Of note, pt has been receiving IVIG infusions. s/p IVIG on  and next scheduled infusion was . NO sick contacts.    In the ED, VS T102.9F HR 98 /76 RR 30 SpO2 98% on RA. Labs significant for leukopenia 2.29, anemia 8.5/26.2, thrombocytopenia 106, BUN/Cr 25/1.5, RVP parainfluenza positive.  Received Zosyn x1, 1.7L NS bolus x1, tylenol 975 mg PO x2, duoneb x1 in the ED.  CXR showing RUL PNA  EKG NSR with nonspecific ST abnormality unchanged from prior EKG (2022 20:01)    Renal consult called for chronic kidney disease stage 3. History obtained from chart and patient.       PAST MEDICAL HISTORY:  COPD (chronic obstructive pulmonary disease)    DM (diabetes mellitus)    Pulmonary fibrosis    PAF (paroxysmal atrial fibrillation)    Hiatal hernia    GIB (gastrointestinal bleeding)    Pericarditis    Shoulder fracture, right    Hematoma        PAST SURGICAL HISTORY:  S/P hysterectomy    S/P foot surgery    S/P knee surgery    After cataract    Closed right hip fracture    S/P IVC filter    S/P carpal tunnel release        FAMILY HISTORY:  Family history of bladder cancer (Mother)    Family history of myocardial infarction (Father)        SOCIAL HISTORY: No smoking or alcohol use     Allergies    No Known Allergies    Intolerances      Home Medications:  amiodarone 100 mg oral tablet: 1 tab(s) orally once a day (2022 21:15)  Bentyl 10 mg oral capsule: 1 cap(s) orally 2 times a day, As Needed (2022 21:15)  Cardizem: 25 milligram(s) orally 2 times a day (2022 21:15)  Coumadin 4 mg oral tablet: 1 tab(s) orally once a day  4mg 4x week; 6mg 3x week (2022 21:15)  folic acid 1 mg oral tablet: 1 tab(s) orally once a day (2022 21:15)  gabapentin 400 mg oral capsule: 1 cap(s) orally 3 times a day (2022 21:15)  NexIUM: 15 milligram(s) orally once a day (2022 21:15)  Procrit 10,000 units/mL preservative-free injectable solution: injectable every other week (2022 21:15)  simvastatin 10 mg oral tablet: 1 tab(s) orally once a day (at bedtime) (2022 21:15)  Singulair 10 mg oral tablet: 1 tab(s) orally once a day (2022 21:15)  traMADol 50 mg oral tablet: 1 tab(s) orally 2 times a day (2022 21:15)  Trelegy Ellipta: inhaled once a day (2022 21:15)  Vitamin D3 25 mcg (1000 intl units) oral capsule: 1000 milligram(s) orally 3 times a week (2022 21:15)    MEDICATIONS  (STANDING):  albuterol/ipratropium for Nebulization 3 milliLiter(s) Nebulizer every 6 hours  aMIOdarone    Tablet 100 milliGRAM(s) Oral daily  azithromycin  IVPB 500 milliGRAM(s) IV Intermittent every 24 hours  azithromycin  IVPB      budesonide  80 MICROgram(s)/formoterol 4.5 MICROgram(s) Inhaler 2 Puff(s) Inhalation two times a day  cefTRIAXone   IVPB 1000 milliGRAM(s) IV Intermittent every 24 hours  cefTRIAXone   IVPB      dextrose 5%. 1000 milliLiter(s) (50 mL/Hr) IV Continuous <Continuous>  dextrose 5%. 1000 milliLiter(s) (100 mL/Hr) IV Continuous <Continuous>  dextrose 50% Injectable 25 Gram(s) IV Push once  dextrose 50% Injectable 12.5 Gram(s) IV Push once  dextrose 50% Injectable 25 Gram(s) IV Push once  diltiazem    Tablet 30 milliGRAM(s) Oral two times a day  folic acid 1 milliGRAM(s) Oral daily  gabapentin 300 milliGRAM(s) Oral three times a day  glucagon  Injectable 1 milliGRAM(s) IntraMuscular once  insulin lispro (ADMELOG) corrective regimen sliding scale   SubCutaneous three times a day before meals  insulin lispro (ADMELOG) corrective regimen sliding scale   SubCutaneous at bedtime  methylPREDNISolone sodium succinate Injectable 40 milliGRAM(s) IV Push every 12 hours  montelukast 10 milliGRAM(s) Oral daily  pantoprazole    Tablet 40 milliGRAM(s) Oral before breakfast  simvastatin 10 milliGRAM(s) Oral at bedtime  tiotropium 18 MICROgram(s) Capsule 1 Capsule(s) Inhalation daily  traMADol 50 milliGRAM(s) Oral two times a day  warfarin 2.5 milliGRAM(s) Oral once    MEDICATIONS  (PRN):  acetaminophen     Tablet .. 650 milliGRAM(s) Oral every 6 hours PRN Temp greater or equal to 38C (100.4F), Mild Pain (1 - 3)  dextrose Oral Gel 15 Gram(s) Oral once PRN Blood Glucose LESS THAN 70 milliGRAM(s)/deciliter  dicyclomine 10 milliGRAM(s) Oral two times a day before meals PRN abd pain  guaiFENesin Oral Liquid (Sugar-Free) 200 milliGRAM(s) Oral every 6 hours PRN Cough      REVIEW OF SYSTEMS:  General:   Respiratory: No cough, SOB  Cardiovascular: No CP or Palpitations	  Gastrointestinal: No nausea, Vomiting. No diarrhea  Genitourinary: No urinary complaints	  Musculoskeletal: No leg swelling, No new rash or lesions	  Neurological: 	  all other systems negative    T(F): 97.7 (22 @ 04:26), Max: 98.7 (22 @ 17:20)  HR: 68 (22 @ 14:03) (61 - 73)  BP: 147/75 (22 @ 04:26) (105/63 - 147/75)  RR: 18 (22 @ 04:26) (17 - 18)  SpO2: 99% (22 @ 14:03) (98% - 99%)  Wt(kg): --    PHYSICAL EXAM:  General: NAD  Respiratory: b/l air entry  Cardiovascular: S1 S2  Gastrointestinal: soft  Extremities: no edema            140  |  107  |  35<H>  ----------------------------<  146<H>  4.3   |  23  |  1.50<H>    Ca    8.8      2022 11:11  Mg     2.3         TPro  6.6  /  Alb  3.1<L>  /  TBili  0.7  /  DBili  x   /  AST  15  /  ALT  15  /  AlkPhos  59                            9.1    1.42  )-----------( 178      ( 2022 11:11 )             29.3       Potassium, Serum: 4.3 mmol/L ( @ 11:11)  Blood Urea Nitrogen, Serum: 35 mg/dL ( @ 11:11)  Calcium, Total Serum: 8.8 mg/dL ( @ 11:11)  Hemoglobin: 9.1 g/dL ( @ 11:11)      Creatinine, Serum: 1.50 ( @ 11:11)  Creatinine, Serum: 1.30 ( @ 08:03)  Creatinine, Serum: 1.50 ( @ 12:19)      Urinalysis Basic - ( 2022 08:44 )    Color: Yellow / Appearance: Clear / S.020 / pH: x  Gluc: x / Ketone: Negative  / Bili: Negative / Urobili: Negative   Blood: x / Protein: 30 mg/dL / Nitrite: Negative   Leuk Esterase: Negative / RBC: 0-2 /HPF / WBC 0-2   Sq Epi: x / Non Sq Epi: Occasional / Bacteria: Occasional      LIVER FUNCTIONS - ( 2022 08:03 )  Alb: 3.1 g/dL / Pro: 6.6 g/dL / ALK PHOS: 59 U/L / ALT: 15 U/L / AST: 15 U/L / GGT: x                       I&O's Detail    2022 07:01  -  2022 07:00  --------------------------------------------------------  IN:    Oral Fluid: 360 mL  Total IN: 360 mL    OUT:    Voided (mL): 700 mL  Total OUT: 700 mL    Total NET: -340 mL            Culture - Blood (collected 2022 15:06)  Source: .Blood Blood-Peripheral  Preliminary Report (2022 16:01):    No growth to date.    Culture - Blood (collected 2022 15:06)  Source: .Blood Blood-Peripheral  Preliminary Report (2022 16:01):    No growth to date.    < from: Xray Chest 1 View- PORTABLE-Urgent (22 @ 12:13) >    ACC: [id number] EXAM:  XR CHEST PORTABLE URGENT 1V                          PROCEDURE DATE:  2022          INTERPRETATION:  INDICATION: Sepsis    PRIORS: 2020    VIEWS: Portable AP radiography of the chest performed.    FINDINGS: Heart size appears within normal limits. No hilar or superior   mediastinal abnormalities are identified. A region of opacity is   identified overlying the right upper lung field at the level of the   anterior aspect of the right second rib, which may represent infiltrate   in light of provided clinical history of sepsis. There is no evidence for   pulmonary edema. No pleural effusion or pneumothorax is demonstrated. No   mediastinal shift is noted. The visualized osseous structures appear   unremarkable.    IMPRESSION: A region of opacity is identified overlying the right upper   lung field at the level of the anterior aspect of the right second rib,   which may represent infiltrate in light of provided clinical history of   sepsis. Clinical correlation and radiographic follow-up is recommended to   ensure clearing.    --- End of Report ---            EMILY NAVARRETE MD; Attending Radiologist  This document has been electronically signed. 2022 12:20PM    < end of copied text >

## 2022-04-22 NOTE — CONSULT NOTE ADULT - ASSESSMENT
CKD 3  Pneumonia, + parainfluenza  Diabetes  Hypertension    Mild prerenal component. Encourage Po intake. Rx for pneumonia. Monitor blood sugar levels. Insulin coverage as needed. Dietary restriction.   Monitor BP trend. Titrate BP meds as needed. Salt restriction. Will follow electrolytes and renal function trend.   Further recommendations pending clinical course. Thank you for the courtesy of this referral.  CKD 3  Pneumonia  Diabetes  Hypertension  h/o CHF    Mild prerenal component. Will monitor off IVF. Encourage Po intake. Rx for pneumonia. Monitor blood sugar levels. Insulin coverage as needed. Dietary restriction.   Monitor BP trend. Titrate BP meds as needed. Salt restriction. Will follow electrolytes and renal function trend.   Further recommendations pending clinical course. Thank you for the courtesy of this referral.

## 2022-04-22 NOTE — DISCHARGE NOTE PROVIDER - NSDCCPCAREPLAN_GEN_ALL_CORE_FT
PRINCIPAL DISCHARGE DIAGNOSIS  Diagnosis: Parainfluenza  Assessment and Plan of Treatment: You were treated with tylenol and mucinex, and steroids as per recommendations from Pulmonology; f/u with pulm in outpatient setting      SECONDARY DISCHARGE DIAGNOSES  Diagnosis: COPD exacerbation  Assessment and Plan of Treatment: you were treated with duonebs and prednisone    Diagnosis: Anemia  Assessment and Plan of Treatment: You were transfused iwth PRBCs as per heme onc recs; f/u with eheme-onc

## 2022-04-22 NOTE — DISCHARGE NOTE PROVIDER - NSDCMRMEDTOKEN_GEN_ALL_CORE_FT
amiodarone 100 mg oral tablet: 1 tab(s) orally once a day  Bentyl 10 mg oral capsule: 1 cap(s) orally 2 times a day, As Needed  Cardizem: 25 milligram(s) orally 2 times a day  Coumadin 4 mg oral tablet: 1 tab(s) orally once a day  4mg 4x week; 6mg 3x week  folic acid 1 mg oral tablet: 1 tab(s) orally once a day  gabapentin 400 mg oral capsule: 1 cap(s) orally 3 times a day  NexIUM: 15 milligram(s) orally once a day  Procrit 10,000 units/mL preservative-free injectable solution: injectable every other week  simvastatin 10 mg oral tablet: 1 tab(s) orally once a day (at bedtime)  Singulair 10 mg oral tablet: 1 tab(s) orally once a day  traMADol 50 mg oral tablet: 1 tab(s) orally 2 times a day  Trelegy Ellipta: inhaled once a day  Vitamin D3 25 mcg (1000 intl units) oral capsule: 1000 milligram(s) orally 3 times a week   amiodarone 100 mg oral tablet: 1 tab(s) orally once a day  Bentyl 10 mg oral capsule: 1 cap(s) orally 2 times a day, As Needed  Cardizem: 25 milligram(s) orally 2 times a day  Coumadin 4 mg oral tablet: 1 tab(s) orally once a day  4mg 4x week; 6mg 3x week  folic acid 1 mg oral tablet: 1 tab(s) orally once a day  gabapentin 400 mg oral capsule: 1 cap(s) orally 3 times a day  NexIUM: 15 milligram(s) orally once a day  predniSONE 10 mg oral tablet: 3 tab(s) orally once a day for three days; then 2 tabs once a day for three days; then 1 tab once a day for three days  Procrit 10,000 units/mL preservative-free injectable solution: injectable every other week  simvastatin 10 mg oral tablet: 1 tab(s) orally once a day (at bedtime)  Singulair 10 mg oral tablet: 1 tab(s) orally once a day  traMADol 50 mg oral tablet: 1 tab(s) orally 2 times a day  Trelegy Ellipta: inhaled once a day  Vitamin D3 25 mcg (1000 intl units) oral capsule: 1000 milligram(s) orally 3 times a week

## 2022-04-22 NOTE — CONSULT NOTE ADULT - REASON FOR ADMISSION
superimposed bacterial PNA and COPD exacerbation

## 2022-04-22 NOTE — DISCHARGE NOTE PROVIDER - CARE PROVIDERS DIRECT ADDRESSES
[email or web address],DirectAddress_Unknown

## 2022-04-22 NOTE — DISCHARGE NOTE PROVIDER - NSDCQMERRANDS_GEN_ALL_CORE
Sharan Steiner   1/10/2017 9:00 AM   Office Visit    Dept Phone:  [phone]   Encounter #:  [id number]    Provider:  Jeff Crabtree MD   Department:  [facility] GASTROENTEROLOGY                Your Full Care Plan              Today's Medication Changes          These changes are accurate as of: 1/10/17  9:37 AM.  If you have any questions, ask your nurse or doctor.               New Medication(s)Ordered:     sodium-potassium-magnesium sulfates solution oral solution   Commonly known as:  SUPREP   Take 1 bottle by mouth Every 12 (Twelve) Hours.   Started by:  Jeff Crabtree MD            Where to Get Your Medications      These medications were sent to [facility] [place] - [address]  [phone] [facility] [phone]   [address] [place] [address]     Phone:  [phone]     sodium-potassium-magnesium sulfates solution oral solution                  Your Updated Medication List          This list is accurate as of: 1/10/17  9:37 AM.  Always use your most recent med list.                * aspirin 81 MG chewable tablet       * aspirin 325 MG tablet       atenolol 50 MG tablet   Commonly known as:  TENORMIN       doxazosin 4 MG tablet   Commonly known as:  CARDURA       fluticasone 50 MCG/ACT nasal spray   Commonly known as:  FLONASE       HYDROcodone-acetaminophen 7.5-325 MG per tablet   Commonly known as:  NORCO       lisinopril 10 MG tablet   Commonly known as:  PRINIVIL,ZESTRIL       sodium-potassium-magnesium sulfates solution oral solution   Commonly known as:  SUPREP   Take 1 bottle by mouth Every 12 (Twelve) Hours.       * Notice:  This list has 2 medication(s) that are the same as other medications prescribed for you. Read the directions carefully, and ask your doctor or other care provider to review them with you.            We Performed the Following     Case request       You Were Diagnosed With        Codes Comments    Abnormal  finding on radiology exam    -  Primary ICD-10-CM: R93.8  ICD-9-CM: 793.99       Instructions     None    Patient Instructions History      Upcoming Appointments     Visit Type Date Time Department    NEW PATIENT 1/10/2017  9:00 AM [facility] GASTROENT  [facility]    POST-OP 1/19/2017  9:00 AM [facility] GEN SURGERY [facility]    CONSULT - ONCOLOGY 1/23/2017 10:00 AM [facility] RAD ONC [facility]    OFFICE VISIT 1/24/2017 10:00 AM [facility] GASTROENT  [facility]    OFFICE VISIT 3/8/2017  9:30 AM [facility] OPHTHALMOLOGY [facility]    FOLLOW UP 9/14/2017  9:45 AM [facility] OTOLARYNGOLOGY [facility] [place]      MyChart Signup     Our records indicate that you have declined [facility] CBRITEhart signup. If you would like to sign up for CBRITEhart, please email [email or web address] or call [phone] to obtain an activation code.             Other Info from Your Visit           Your Appointments     Jan 19, 2017  9:00 AM CST   Post-Op with Wilfrido Ramsey MD   [facility] GENERAL SURGERY (--)    [address] [place] [address] Dr  Medical [address] [address] [phone]            Jan 23, 2017 10:00 AM CST   CONSULT with Paulino Candelario MD   [facility] RADIATION ONCOLOGY (--)    [address] [facility] [address] [phone]            Jan 24, 2017 10:00 AM CST   Office Visit with Jeff Crabtree MD   [facility] GASTROENTEROLOGY (--)    [address] [place] [address] [address]  [address] [facility] [address] [phone]           Arrive 15 minutes prior to appointment.            Mar 08, 2017  9:30 AM CST   Office Visit with Phuc Alexandre MD   [facility] OPHTHALMOLOGY (--)    [address] [place] [address] [address]  [address] [facility] [address] [phone]           Arrive 15 minutes prior to appointment.            Sep 14, 2017  9:45 AM CDT   Follow Up with García De Souza MD   [facility] OTOLARYNGOLOGY (--)    [address] [place] [address] Dr  [address] [facility] [address] [phone]           Arrive  "15 minutes prior to appointment.              Allergies     Crestor [Rosuvastatin Calcium]      Levofloxacin      Penicillins      Sulfa Antibiotics      Tramadol        Reason for Visit     Colonoscopy Consult per Zita      Vital Signs     Blood Pressure Pulse Respirations Height Weight Oxygen Saturation    166/98 54 18 66\" (167.6 cm) 197 lb (89.4 kg) 95%    Body Mass Index Smoking Status                31.8 kg/m2 Former Smoker          Problems and Diagnoses Noted     Abnormal finding on radiology exam        " No

## 2022-04-22 NOTE — PROGRESS NOTE ADULT - SUBJECTIVE AND OBJECTIVE BOX
CHIEF COMPLAINT/INTERVAL HISTORY:  Pt. seen and evaluated for pneumonia and copd exacerbation.  Pt. is in no distress.  Reports feeling better with less SOB.  Tolerating IV antibiotic.    REVIEW OF SYSTEMS:  No fever, CP, or abdominal pain    Vital Signs Last 24 Hrs  T(C): 36.5 (2022 04:26), Max: 37.2 (2022 13:37)  T(F): 97.7 (2022 04:26), Max: 99 (2022 13:37)  HR: 64 (2022 08:33) (61 - 74)  BP: 147/75 (2022 04:26) (105/63 - 147/75)  BP(mean): --  RR: 18 (2022 04:26) (17 - 18)  SpO2: 99% (2022 08:33) (98% - 99%)    PHYSICAL EXAM:  GENERAL: NAD  HEENT: EOMI, hearing normal, conjunctiva and sclera clear  Chest: Coarse BS, no wheezing  CV: S1S2, RRR,   GI: soft, +BS, NT/ND  Musculoskeletal: no edema  Psychiatric: affect nL, mood nL  Skin: warm and dry    LABS:                        8.8    1.70  )-----------( 125      ( 2022 08:03 )             27.6     04-22    140  |  107  |  35<H>  ----------------------------<  146<H>  4.3   |  23  |  1.50<H>    Ca    8.8      2022 11:11  Mg     2.3     04-22    TPro  6.6  /  Alb  3.1<L>  /  TBili  0.7  /  DBili  x   /  AST  15  /  ALT  15  /  AlkPhos  59  04-21    PT/INR - ( 2022 11:11 )   PT: 33.0 sec;   INR: 2.79 ratio         PTT - ( 2022 12:19 )  PTT:39.9 sec  Urinalysis Basic - ( 2022 08:44 )    Color: Yellow / Appearance: Clear / S.020 / pH: x  Gluc: x / Ketone: Negative  / Bili: Negative / Urobili: Negative   Blood: x / Protein: 30 mg/dL / Nitrite: Negative   Leuk Esterase: Negative / RBC: 0-2 /HPF / WBC 0-2   Sq Epi: x / Non Sq Epi: Occasional / Bacteria: Occasional

## 2022-04-23 LAB
ANION GAP SERPL CALC-SCNC: 9 MMOL/L — SIGNIFICANT CHANGE UP (ref 5–17)
BASOPHILS # BLD AUTO: 0 K/UL — SIGNIFICANT CHANGE UP (ref 0–0.2)
BASOPHILS NFR BLD AUTO: 0 % — SIGNIFICANT CHANGE UP (ref 0–2)
BUN SERPL-MCNC: 36 MG/DL — HIGH (ref 7–23)
CALCIUM SERPL-MCNC: 9.1 MG/DL — SIGNIFICANT CHANGE UP (ref 8.5–10.1)
CHLORIDE SERPL-SCNC: 105 MMOL/L — SIGNIFICANT CHANGE UP (ref 96–108)
CO2 SERPL-SCNC: 26 MMOL/L — SIGNIFICANT CHANGE UP (ref 22–31)
CREAT SERPL-MCNC: 1.3 MG/DL — SIGNIFICANT CHANGE UP (ref 0.5–1.3)
EGFR: 42 ML/MIN/1.73M2 — LOW
EOSINOPHIL # BLD AUTO: 0 K/UL — SIGNIFICANT CHANGE UP (ref 0–0.5)
EOSINOPHIL NFR BLD AUTO: 0 % — SIGNIFICANT CHANGE UP (ref 0–6)
GLUCOSE SERPL-MCNC: 164 MG/DL — HIGH (ref 70–99)
HCT VFR BLD CALC: 29.9 % — LOW (ref 34.5–45)
HGB BLD-MCNC: 9.3 G/DL — LOW (ref 11.5–15.5)
INR BLD: 3.47 RATIO — HIGH (ref 0.88–1.16)
LYMPHOCYTES # BLD AUTO: 0.46 K/UL — LOW (ref 1–3.3)
LYMPHOCYTES # BLD AUTO: 22 % — SIGNIFICANT CHANGE UP (ref 13–44)
MAGNESIUM SERPL-MCNC: 2.4 MG/DL — SIGNIFICANT CHANGE UP (ref 1.6–2.6)
MCHC RBC-ENTMCNC: 31.1 GM/DL — LOW (ref 32–36)
MCHC RBC-ENTMCNC: 32 PG — SIGNIFICANT CHANGE UP (ref 27–34)
MCV RBC AUTO: 102.7 FL — HIGH (ref 80–100)
MONOCYTES # BLD AUTO: 0.17 K/UL — SIGNIFICANT CHANGE UP (ref 0–0.9)
MONOCYTES NFR BLD AUTO: 8 % — SIGNIFICANT CHANGE UP (ref 2–14)
NEUTROPHILS # BLD AUTO: 1.46 K/UL — LOW (ref 1.8–7.4)
NEUTROPHILS NFR BLD AUTO: 69 % — SIGNIFICANT CHANGE UP (ref 43–77)
NRBC # BLD: SIGNIFICANT CHANGE UP /100 WBCS (ref 0–0)
PLATELET # BLD AUTO: 254 K/UL — SIGNIFICANT CHANGE UP (ref 150–400)
POTASSIUM SERPL-MCNC: 4.5 MMOL/L — SIGNIFICANT CHANGE UP (ref 3.5–5.3)
POTASSIUM SERPL-SCNC: 4.5 MMOL/L — SIGNIFICANT CHANGE UP (ref 3.5–5.3)
PROTHROM AB SERPL-ACNC: 41.1 SEC — HIGH (ref 10.5–13.4)
RBC # BLD: 2.91 M/UL — LOW (ref 3.8–5.2)
RBC # FLD: 21.6 % — HIGH (ref 10.3–14.5)
SODIUM SERPL-SCNC: 140 MMOL/L — SIGNIFICANT CHANGE UP (ref 135–145)
WBC # BLD: 2.08 K/UL — LOW (ref 3.8–10.5)
WBC # FLD AUTO: 2.08 K/UL — LOW (ref 3.8–10.5)

## 2022-04-23 PROCEDURE — 99232 SBSQ HOSP IP/OBS MODERATE 35: CPT

## 2022-04-23 RX ADMIN — TIOTROPIUM BROMIDE 1 CAPSULE(S): 18 CAPSULE ORAL; RESPIRATORY (INHALATION) at 06:26

## 2022-04-23 RX ADMIN — Medication 3 MILLILITER(S): at 20:56

## 2022-04-23 RX ADMIN — Medication 3 MILLILITER(S): at 07:54

## 2022-04-23 RX ADMIN — Medication 1 MILLIGRAM(S): at 12:08

## 2022-04-23 RX ADMIN — TRAMADOL HYDROCHLORIDE 50 MILLIGRAM(S): 50 TABLET ORAL at 06:10

## 2022-04-23 RX ADMIN — AMIODARONE HYDROCHLORIDE 100 MILLIGRAM(S): 400 TABLET ORAL at 05:09

## 2022-04-23 RX ADMIN — Medication 600 MILLIGRAM(S): at 18:48

## 2022-04-23 RX ADMIN — GABAPENTIN 300 MILLIGRAM(S): 400 CAPSULE ORAL at 05:10

## 2022-04-23 RX ADMIN — BUDESONIDE AND FORMOTEROL FUMARATE DIHYDRATE 2 PUFF(S): 160; 4.5 AEROSOL RESPIRATORY (INHALATION) at 06:26

## 2022-04-23 RX ADMIN — Medication 200 MILLIGRAM(S): at 10:57

## 2022-04-23 RX ADMIN — Medication 40 MILLIGRAM(S): at 18:47

## 2022-04-23 RX ADMIN — GABAPENTIN 300 MILLIGRAM(S): 400 CAPSULE ORAL at 15:16

## 2022-04-23 RX ADMIN — CEFTRIAXONE 100 MILLIGRAM(S): 500 INJECTION, POWDER, FOR SOLUTION INTRAMUSCULAR; INTRAVENOUS at 21:20

## 2022-04-23 RX ADMIN — BUDESONIDE AND FORMOTEROL FUMARATE DIHYDRATE 2 PUFF(S): 160; 4.5 AEROSOL RESPIRATORY (INHALATION) at 18:48

## 2022-04-23 RX ADMIN — Medication 3 MILLILITER(S): at 13:58

## 2022-04-23 RX ADMIN — Medication 40 MILLIGRAM(S): at 05:09

## 2022-04-23 RX ADMIN — PANTOPRAZOLE SODIUM 40 MILLIGRAM(S): 20 TABLET, DELAYED RELEASE ORAL at 05:10

## 2022-04-23 RX ADMIN — GABAPENTIN 300 MILLIGRAM(S): 400 CAPSULE ORAL at 21:20

## 2022-04-23 RX ADMIN — TRAMADOL HYDROCHLORIDE 50 MILLIGRAM(S): 50 TABLET ORAL at 18:47

## 2022-04-23 RX ADMIN — SIMVASTATIN 10 MILLIGRAM(S): 20 TABLET, FILM COATED ORAL at 21:20

## 2022-04-23 RX ADMIN — Medication 30 MILLIGRAM(S): at 18:48

## 2022-04-23 RX ADMIN — MONTELUKAST 10 MILLIGRAM(S): 4 TABLET, CHEWABLE ORAL at 12:08

## 2022-04-23 RX ADMIN — Medication 30 MILLIGRAM(S): at 05:10

## 2022-04-23 RX ADMIN — TRAMADOL HYDROCHLORIDE 50 MILLIGRAM(S): 50 TABLET ORAL at 05:10

## 2022-04-23 NOTE — PROGRESS NOTE ADULT - SUBJECTIVE AND OBJECTIVE BOX
CHIEF COMPLAINT/INTERVAL HISTORY:  Pt. seen and evaluated for pneumonia, parainfluenza infection, and copd exacerbation.  Pt. is in no distress.  Tolerating IV antibiotics and steroids.  Reports having significant cough and not able to bring up sputum.      REVIEW OF SYSTEMS:  No fever, CP, respiratory distress, or abdominal pain     Vital Signs Last 24 Hrs  T(C): 36.4 (23 Apr 2022 05:06), Max: 36.5 (22 Apr 2022 21:39)  T(F): 97.6 (23 Apr 2022 05:06), Max: 97.7 (22 Apr 2022 21:39)  HR: 68 (23 Apr 2022 07:55) (62 - 82)  BP: 151/72 (23 Apr 2022 05:06) (124/66 - 151/72)  BP(mean): --  RR: 18 (23 Apr 2022 05:06) (17 - 18)  SpO2: 98% (23 Apr 2022 07:55) (96% - 99%)    PHYSICAL EXAM:  GENERAL: NAD  HEENT: EOMI, hearing normal, conjunctiva and sclera clear  Chest: mild exp wheeze  CV: S1S2, RRR,   GI: soft, +BS, NT/ND  Musculoskeletal: no edema  Psychiatric: affect nL, mood nL  Skin: warm and dry    LABS:                        9.3    2.08  )-----------( 254      ( 23 Apr 2022 09:25 )             29.9     04-23    140  |  105  |  36<H>  ----------------------------<  164<H>  4.5   |  26  |  1.30    Ca    9.1      23 Apr 2022 09:25  Mg     2.4     04-23      PT/INR - ( 23 Apr 2022 09:25 )   PT: 41.1 sec;   INR: 3.47 ratio

## 2022-04-23 NOTE — PROGRESS NOTE ADULT - SUBJECTIVE AND OBJECTIVE BOX
All interim records and events noted.    sitting up at bedside, still feeling poorly, w cough, not worse but not iimproved  some fatigue      MEDICATIONS  (STANDING):  albuterol/ipratropium for Nebulization 3 milliLiter(s) Nebulizer every 6 hours  aMIOdarone    Tablet 100 milliGRAM(s) Oral daily  budesonide  80 MICROgram(s)/formoterol 4.5 MICROgram(s) Inhaler 2 Puff(s) Inhalation two times a day  cefTRIAXone   IVPB 1000 milliGRAM(s) IV Intermittent every 24 hours  cefTRIAXone   IVPB      dextrose 5%. 1000 milliLiter(s) (100 mL/Hr) IV Continuous <Continuous>  dextrose 5%. 1000 milliLiter(s) (50 mL/Hr) IV Continuous <Continuous>  dextrose 50% Injectable 25 Gram(s) IV Push once  dextrose 50% Injectable 12.5 Gram(s) IV Push once  dextrose 50% Injectable 25 Gram(s) IV Push once  diltiazem    Tablet 30 milliGRAM(s) Oral two times a day  folic acid 1 milliGRAM(s) Oral daily  gabapentin 300 milliGRAM(s) Oral three times a day  glucagon  Injectable 1 milliGRAM(s) IntraMuscular once  guaiFENesin  milliGRAM(s) Oral every 12 hours  insulin lispro (ADMELOG) corrective regimen sliding scale   SubCutaneous three times a day before meals  insulin lispro (ADMELOG) corrective regimen sliding scale   SubCutaneous at bedtime  methylPREDNISolone sodium succinate Injectable 40 milliGRAM(s) IV Push every 12 hours  montelukast 10 milliGRAM(s) Oral daily  pantoprazole    Tablet 40 milliGRAM(s) Oral before breakfast  simvastatin 10 milliGRAM(s) Oral at bedtime  tiotropium 18 MICROgram(s) Capsule 1 Capsule(s) Inhalation daily  traMADol 50 milliGRAM(s) Oral two times a day    MEDICATIONS  (PRN):  acetaminophen     Tablet .. 650 milliGRAM(s) Oral every 6 hours PRN Temp greater or equal to 38C (100.4F), Mild Pain (1 - 3)  dextrose Oral Gel 15 Gram(s) Oral once PRN Blood Glucose LESS THAN 70 milliGRAM(s)/deciliter  dicyclomine 10 milliGRAM(s) Oral two times a day before meals PRN abd pain  guaiFENesin Oral Liquid (Sugar-Free) 200 milliGRAM(s) Oral every 6 hours PRN Cough      Vital Signs Last 24 Hrs  T(C): 36.4 (23 Apr 2022 05:06), Max: 36.5 (22 Apr 2022 21:39)  T(F): 97.6 (23 Apr 2022 05:06), Max: 97.7 (22 Apr 2022 21:39)  HR: 68 (23 Apr 2022 07:55) (62 - 82)  BP: 151/72 (23 Apr 2022 05:06) (124/66 - 151/72)  BP(mean): --  RR: 18 (23 Apr 2022 05:06) (17 - 18)  SpO2: 98% (23 Apr 2022 07:55) (96% - 99%)    PHYSICAL EXAM  General: well developed  well nourished, in no acute distress  Head: atraumatic, normocephalic  ENT: sclera anicteric  Neck: supple, trachea midline  CV: S1 S2, regular rate and rhythm  Lungs: clear to auscultation, no wheezes/rhonchi  Abdomen: soft, nontender, bowel sounds present, no palpable masses, pouchy  Extrem: no clubbing/cyanosis/edema  Skin: no significant increased ecchymosis/petechiae  Neuro: alert and oriented X3,  no focal deficits      LABS:             9.3    2.08  )-----------( 254      ( 04-23 @ 09:25 )             29.9                9.1    1.42  )-----------( 178      ( 04-22 @ 11:11 )             29.3                8.8    1.70  )-----------( 125      ( 04-21 @ 08:03 )             27.6                8.5    2.29  )-----------( 106      ( 04-20 @ 12:19 )             26.2       04-23    140  |  105  |  36<H>  ----------------------------<  164<H>  4.5   |  26  |  1.30    Ca    9.1      23 Apr 2022 09:25  Mg     2.4     04-23 04-23 @ 09:25  PT41.1 INR3.47  PTT--  04-22 @ 11:11  PT33.0 INR2.79  PTT--  04-21 @ 13:20  PT29.8 INR2.52  PTT--  04-20 @ 12:19  PT32.9 INR2.78  PTT39.9      RADIOLOGY & ADDITIONAL STUDIES:    IMPRESSION/RECOMMENDATIONS:

## 2022-04-23 NOTE — PROGRESS NOTE ADULT - SUBJECTIVE AND OBJECTIVE BOX
Patient is a 81y old  Female who presents with a chief complaint of superimposed bacterial PNA and COPD exacerbation (23 Apr 2022 12:13)      INTERVAL HPI/OVERNIGHT EVENTS:    continues to have cough and wheezing    MEDICATIONS  (STANDING):  albuterol/ipratropium for Nebulization 3 milliLiter(s) Nebulizer every 6 hours  aMIOdarone    Tablet 100 milliGRAM(s) Oral daily  budesonide  80 MICROgram(s)/formoterol 4.5 MICROgram(s) Inhaler 2 Puff(s) Inhalation two times a day  cefTRIAXone   IVPB 1000 milliGRAM(s) IV Intermittent every 24 hours  cefTRIAXone   IVPB      dextrose 5%. 1000 milliLiter(s) (100 mL/Hr) IV Continuous <Continuous>  dextrose 5%. 1000 milliLiter(s) (50 mL/Hr) IV Continuous <Continuous>  dextrose 50% Injectable 25 Gram(s) IV Push once  dextrose 50% Injectable 12.5 Gram(s) IV Push once  dextrose 50% Injectable 25 Gram(s) IV Push once  diltiazem    Tablet 30 milliGRAM(s) Oral two times a day  folic acid 1 milliGRAM(s) Oral daily  gabapentin 300 milliGRAM(s) Oral three times a day  glucagon  Injectable 1 milliGRAM(s) IntraMuscular once  guaiFENesin  milliGRAM(s) Oral every 12 hours  insulin lispro (ADMELOG) corrective regimen sliding scale   SubCutaneous three times a day before meals  insulin lispro (ADMELOG) corrective regimen sliding scale   SubCutaneous at bedtime  methylPREDNISolone sodium succinate Injectable 40 milliGRAM(s) IV Push every 12 hours  montelukast 10 milliGRAM(s) Oral daily  pantoprazole    Tablet 40 milliGRAM(s) Oral before breakfast  simvastatin 10 milliGRAM(s) Oral at bedtime  tiotropium 18 MICROgram(s) Capsule 1 Capsule(s) Inhalation daily  traMADol 50 milliGRAM(s) Oral two times a day      MEDICATIONS  (PRN):  acetaminophen     Tablet .. 650 milliGRAM(s) Oral every 6 hours PRN Temp greater or equal to 38C (100.4F), Mild Pain (1 - 3)  dextrose Oral Gel 15 Gram(s) Oral once PRN Blood Glucose LESS THAN 70 milliGRAM(s)/deciliter  dicyclomine 10 milliGRAM(s) Oral two times a day before meals PRN abd pain  guaiFENesin Oral Liquid (Sugar-Free) 200 milliGRAM(s) Oral every 6 hours PRN Cough      Allergies    No Known Allergies    Intolerances        PAST MEDICAL & SURGICAL HISTORY:  COPD (chronic obstructive pulmonary disease)    DM (diabetes mellitus)    PAF (paroxysmal atrial fibrillation)  s/p ablation    Hiatal hernia    GIB (gastrointestinal bleeding)    Pericarditis    Shoulder fracture, right    Hematoma  Hematoma of Right wrist    S/P hysterectomy    S/P foot surgery    S/P knee surgery    After cataract  bilateral eye cataract surgically removed    Closed right hip fracture  rigth hip ORIF july 19 2016    S/P IVC filter  nov 2016    S/P carpal tunnel release  Right 2008 &amp; 6/27/17        Vital Signs Last 24 Hrs  T(C): 36.6 (23 Apr 2022 12:30), Max: 36.6 (23 Apr 2022 12:30)  T(F): 97.8 (23 Apr 2022 12:30), Max: 97.8 (23 Apr 2022 12:30)  HR: 75 (23 Apr 2022 18:56) (61 - 82)  BP: 122/66 (23 Apr 2022 18:56) (122/66 - 151/72)  BP(mean): --  RR: 17 (23 Apr 2022 12:30) (17 - 18)  SpO2: 97% (23 Apr 2022 13:58) (96% - 99%)    PHYSICAL EXAMINATION:    GENERAL: The patient is awake and alert in no apparent distress.     HEENT: Head is normocephalic and atraumatic. Extraocular muscles are intact. Mucous membranes are moist.    NECK: no JVD    LUNGS: bilateral expiratory wheezes    HEART: Regular rate and rhythm without murmur.    ABDOMEN: Soft, nontender, and nondistended.      EXTREMITIES: Without any cyanosis, clubbing, rash, lesions or edema.    NEUROLOGIC: Grossly intact.    SKIN: No ulceration or induration present.      LABS:                        9.3    2.08  )-----------( 254      ( 23 Apr 2022 09:25 )             29.9     04-23    140  |  105  |  36<H>  ----------------------------<  164<H>  4.5   |  26  |  1.30    Ca    9.1      23 Apr 2022 09:25  Mg     2.4     04-23      PT/INR - ( 23 Apr 2022 09:25 )   PT: 41.1 sec;   INR: 3.47 ratio                         Procalcitonin, Serum: <0.05 (04-21-22 @ 08:03)      MICROBIOLOGY:  Culture Results:   No growth to date. (04-20 @ 15:06)  Culture Results:   No growth to date. (04-20 @ 15:06)          Assessment:    COPD with exacerbation  Parainfluenza Bronchitis  Acute Hypoxic respiratory failure  Hx Pulmonary emboli  Pancytopenia  PAF    Plan:    Taper steroids  Supplemental oxygen  Nebulized bronchodilators  Symbicort + Spiriva  Amiodarone 100 mg daily           Patient is a 81y old  Female who presents with a chief complaint of superimposed bacterial PNA and COPD exacerbation (23 Apr 2022 12:13)      INTERVAL HPI/OVERNIGHT EVENTS:    continues to have cough and wheezing    MEDICATIONS  (STANDING):  albuterol/ipratropium for Nebulization 3 milliLiter(s) Nebulizer every 6 hours  aMIOdarone    Tablet 100 milliGRAM(s) Oral daily  budesonide  80 MICROgram(s)/formoterol 4.5 MICROgram(s) Inhaler 2 Puff(s) Inhalation two times a day  cefTRIAXone   IVPB 1000 milliGRAM(s) IV Intermittent every 24 hours  cefTRIAXone   IVPB      dextrose 5%. 1000 milliLiter(s) (100 mL/Hr) IV Continuous <Continuous>  dextrose 5%. 1000 milliLiter(s) (50 mL/Hr) IV Continuous <Continuous>  dextrose 50% Injectable 25 Gram(s) IV Push once  dextrose 50% Injectable 12.5 Gram(s) IV Push once  dextrose 50% Injectable 25 Gram(s) IV Push once  diltiazem    Tablet 30 milliGRAM(s) Oral two times a day  folic acid 1 milliGRAM(s) Oral daily  gabapentin 300 milliGRAM(s) Oral three times a day  glucagon  Injectable 1 milliGRAM(s) IntraMuscular once  guaiFENesin  milliGRAM(s) Oral every 12 hours  insulin lispro (ADMELOG) corrective regimen sliding scale   SubCutaneous three times a day before meals  insulin lispro (ADMELOG) corrective regimen sliding scale   SubCutaneous at bedtime  methylPREDNISolone sodium succinate Injectable 40 milliGRAM(s) IV Push every 12 hours  montelukast 10 milliGRAM(s) Oral daily  pantoprazole    Tablet 40 milliGRAM(s) Oral before breakfast  simvastatin 10 milliGRAM(s) Oral at bedtime  tiotropium 18 MICROgram(s) Capsule 1 Capsule(s) Inhalation daily  traMADol 50 milliGRAM(s) Oral two times a day      MEDICATIONS  (PRN):  acetaminophen     Tablet .. 650 milliGRAM(s) Oral every 6 hours PRN Temp greater or equal to 38C (100.4F), Mild Pain (1 - 3)  dextrose Oral Gel 15 Gram(s) Oral once PRN Blood Glucose LESS THAN 70 milliGRAM(s)/deciliter  dicyclomine 10 milliGRAM(s) Oral two times a day before meals PRN abd pain  guaiFENesin Oral Liquid (Sugar-Free) 200 milliGRAM(s) Oral every 6 hours PRN Cough      Allergies    No Known Allergies    Intolerances        PAST MEDICAL & SURGICAL HISTORY:  COPD (chronic obstructive pulmonary disease)    DM (diabetes mellitus)    PAF (paroxysmal atrial fibrillation)  s/p ablation    Hiatal hernia    GIB (gastrointestinal bleeding)    Pericarditis    Shoulder fracture, right    Hematoma  Hematoma of Right wrist    S/P hysterectomy    S/P foot surgery    S/P knee surgery    After cataract  bilateral eye cataract surgically removed    Closed right hip fracture  rigth hip ORIF july 19 2016    S/P IVC filter  nov 2016    S/P carpal tunnel release  Right 2008 &amp; 6/27/17        Vital Signs Last 24 Hrs  T(C): 36.6 (23 Apr 2022 12:30), Max: 36.6 (23 Apr 2022 12:30)  T(F): 97.8 (23 Apr 2022 12:30), Max: 97.8 (23 Apr 2022 12:30)  HR: 75 (23 Apr 2022 18:56) (61 - 82)  BP: 122/66 (23 Apr 2022 18:56) (122/66 - 151/72)  BP(mean): --  RR: 17 (23 Apr 2022 12:30) (17 - 18)  SpO2: 97% (23 Apr 2022 13:58) (96% - 99%)    PHYSICAL EXAMINATION:    GENERAL: The patient is awake and alert in no apparent distress.     HEENT: Head is normocephalic and atraumatic. Extraocular muscles are intact. Mucous membranes are moist.    NECK: no JVD    LUNGS: bilateral expiratory wheezes    HEART: Regular rate and rhythm without murmur.    ABDOMEN: Soft, nontender, and nondistended.      EXTREMITIES: Without any cyanosis, clubbing, rash, lesions or edema.    NEUROLOGIC: Grossly intact.    SKIN: No ulceration or induration present.      LABS:                        9.3    2.08  )-----------( 254      ( 23 Apr 2022 09:25 )             29.9     04-23    140  |  105  |  36<H>  ----------------------------<  164<H>  4.5   |  26  |  1.30    Ca    9.1      23 Apr 2022 09:25  Mg     2.4     04-23      PT/INR - ( 23 Apr 2022 09:25 )   PT: 41.1 sec;   INR: 3.47 ratio                         Procalcitonin, Serum: <0.05 (04-21-22 @ 08:03)      MICROBIOLOGY:  Culture Results:   No growth to date. (04-20 @ 15:06)  Culture Results:   No growth to date. (04-20 @ 15:06)          Assessment:    COPD with exacerbation  Parainfluenza Bronchitis  Acute Hypoxic respiratory failure  Hx Pulmonary emboli  Pancytopenia  Chronic Kidney Disease  PAF    Plan:    Taper steroids  Supplemental oxygen  Nebulized bronchodilators  Symbicort + Spiriva  Amiodarone 100 mg daily           Patient is a 81y old  Female who presents with a chief complaint of superimposed bacterial PNA and COPD exacerbation (23 Apr 2022 12:13)      INTERVAL HPI/OVERNIGHT EVENTS:    continues to have cough and wheezing    MEDICATIONS  (STANDING):  albuterol/ipratropium for Nebulization 3 milliLiter(s) Nebulizer every 6 hours  aMIOdarone    Tablet 100 milliGRAM(s) Oral daily  budesonide  80 MICROgram(s)/formoterol 4.5 MICROgram(s) Inhaler 2 Puff(s) Inhalation two times a day  cefTRIAXone   IVPB 1000 milliGRAM(s) IV Intermittent every 24 hours  cefTRIAXone   IVPB      dextrose 5%. 1000 milliLiter(s) (100 mL/Hr) IV Continuous <Continuous>  dextrose 5%. 1000 milliLiter(s) (50 mL/Hr) IV Continuous <Continuous>  dextrose 50% Injectable 25 Gram(s) IV Push once  dextrose 50% Injectable 12.5 Gram(s) IV Push once  dextrose 50% Injectable 25 Gram(s) IV Push once  diltiazem    Tablet 30 milliGRAM(s) Oral two times a day  folic acid 1 milliGRAM(s) Oral daily  gabapentin 300 milliGRAM(s) Oral three times a day  glucagon  Injectable 1 milliGRAM(s) IntraMuscular once  guaiFENesin  milliGRAM(s) Oral every 12 hours  insulin lispro (ADMELOG) corrective regimen sliding scale   SubCutaneous three times a day before meals  insulin lispro (ADMELOG) corrective regimen sliding scale   SubCutaneous at bedtime  methylPREDNISolone sodium succinate Injectable 40 milliGRAM(s) IV Push every 12 hours  montelukast 10 milliGRAM(s) Oral daily  pantoprazole    Tablet 40 milliGRAM(s) Oral before breakfast  simvastatin 10 milliGRAM(s) Oral at bedtime  tiotropium 18 MICROgram(s) Capsule 1 Capsule(s) Inhalation daily  traMADol 50 milliGRAM(s) Oral two times a day      MEDICATIONS  (PRN):  acetaminophen     Tablet .. 650 milliGRAM(s) Oral every 6 hours PRN Temp greater or equal to 38C (100.4F), Mild Pain (1 - 3)  dextrose Oral Gel 15 Gram(s) Oral once PRN Blood Glucose LESS THAN 70 milliGRAM(s)/deciliter  dicyclomine 10 milliGRAM(s) Oral two times a day before meals PRN abd pain  guaiFENesin Oral Liquid (Sugar-Free) 200 milliGRAM(s) Oral every 6 hours PRN Cough      Allergies    No Known Allergies    Intolerances        PAST MEDICAL & SURGICAL HISTORY:  COPD (chronic obstructive pulmonary disease)    DM (diabetes mellitus)    PAF (paroxysmal atrial fibrillation)  s/p ablation    Hiatal hernia    GIB (gastrointestinal bleeding)    Pericarditis    Shoulder fracture, right    Hematoma  Hematoma of Right wrist    S/P hysterectomy    S/P foot surgery    S/P knee surgery    After cataract  bilateral eye cataract surgically removed    Closed right hip fracture  rigth hip ORIF july 19 2016    S/P IVC filter  nov 2016    S/P carpal tunnel release  Right 2008 &amp; 6/27/17        Vital Signs Last 24 Hrs  T(C): 36.6 (23 Apr 2022 12:30), Max: 36.6 (23 Apr 2022 12:30)  T(F): 97.8 (23 Apr 2022 12:30), Max: 97.8 (23 Apr 2022 12:30)  HR: 75 (23 Apr 2022 18:56) (61 - 82)  BP: 122/66 (23 Apr 2022 18:56) (122/66 - 151/72)  BP(mean): --  RR: 17 (23 Apr 2022 12:30) (17 - 18)  SpO2: 97% (23 Apr 2022 13:58) (96% - 99%)    PHYSICAL EXAMINATION:    GENERAL: The patient is awake and alert in no apparent distress.     HEENT: Head is normocephalic and atraumatic. Extraocular muscles are intact. Mucous membranes are moist.    NECK: no JVD    LUNGS: bilateral expiratory wheezes    HEART: Regular rate and rhythm without murmur.    ABDOMEN: Soft, nontender, and nondistended.      EXTREMITIES: Without any cyanosis, clubbing, rash, lesions or edema.    NEUROLOGIC: Grossly intact.    SKIN: No ulceration or induration present.      LABS:                        9.3    2.08  )-----------( 254      ( 23 Apr 2022 09:25 )             29.9     04-23    140  |  105  |  36<H>  ----------------------------<  164<H>  4.5   |  26  |  1.30    Ca    9.1      23 Apr 2022 09:25  Mg     2.4     04-23      PT/INR - ( 23 Apr 2022 09:25 )   PT: 41.1 sec;   INR: 3.47 ratio                         Procalcitonin, Serum: <0.05 (04-21-22 @ 08:03)      MICROBIOLOGY:  Culture Results:   No growth to date. (04-20 @ 15:06)  Culture Results:   No growth to date. (04-20 @ 15:06)          Assessment:    COPD with exacerbation  Parainfluenza Bronchitis  Acute Hypoxic respiratory failure  Hx Pulmonary emboli - S/P IVC Filter  Pancytopenia  Chronic Kidney Disease  PAF    Plan:    Taper steroids  Supplemental oxygen  Nebulized bronchodilators  Symbicort + Spiriva  Amiodarone 100 mg daily

## 2022-04-24 LAB
ANION GAP SERPL CALC-SCNC: 6 MMOL/L — SIGNIFICANT CHANGE UP (ref 5–17)
BASOPHILS # BLD AUTO: 0 K/UL — SIGNIFICANT CHANGE UP (ref 0–0.2)
BASOPHILS NFR BLD AUTO: 0 % — SIGNIFICANT CHANGE UP (ref 0–2)
BUN SERPL-MCNC: 34 MG/DL — HIGH (ref 7–23)
CALCIUM SERPL-MCNC: 8.4 MG/DL — LOW (ref 8.5–10.1)
CHLORIDE SERPL-SCNC: 105 MMOL/L — SIGNIFICANT CHANGE UP (ref 96–108)
CO2 SERPL-SCNC: 28 MMOL/L — SIGNIFICANT CHANGE UP (ref 22–31)
CREAT SERPL-MCNC: 1.3 MG/DL — SIGNIFICANT CHANGE UP (ref 0.5–1.3)
EGFR: 42 ML/MIN/1.73M2 — LOW
EOSINOPHIL # BLD AUTO: 0 K/UL — SIGNIFICANT CHANGE UP (ref 0–0.5)
EOSINOPHIL NFR BLD AUTO: 0 % — SIGNIFICANT CHANGE UP (ref 0–6)
FERRITIN SERPL-MCNC: 1349 NG/ML — HIGH (ref 15–150)
GLUCOSE SERPL-MCNC: 154 MG/DL — HIGH (ref 70–99)
HCT VFR BLD CALC: 25.6 % — LOW (ref 34.5–45)
HGB BLD-MCNC: 8.1 G/DL — LOW (ref 11.5–15.5)
IMM GRANULOCYTES NFR BLD AUTO: 6.3 % — HIGH (ref 0–1.5)
INR BLD: 3.66 RATIO — HIGH (ref 0.88–1.16)
IRON SATN MFR SERPL: 125 UG/DL — SIGNIFICANT CHANGE UP (ref 30–160)
IRON SATN MFR SERPL: 54 % — HIGH (ref 14–50)
LYMPHOCYTES # BLD AUTO: 0.43 K/UL — LOW (ref 1–3.3)
LYMPHOCYTES # BLD AUTO: 27.2 % — SIGNIFICANT CHANGE UP (ref 13–44)
MAGNESIUM SERPL-MCNC: 2.4 MG/DL — SIGNIFICANT CHANGE UP (ref 1.6–2.6)
MCHC RBC-ENTMCNC: 31.6 GM/DL — LOW (ref 32–36)
MCHC RBC-ENTMCNC: 32.4 PG — SIGNIFICANT CHANGE UP (ref 27–34)
MCV RBC AUTO: 102.4 FL — HIGH (ref 80–100)
MONOCYTES # BLD AUTO: 0.09 K/UL — SIGNIFICANT CHANGE UP (ref 0–0.9)
MONOCYTES NFR BLD AUTO: 5.7 % — SIGNIFICANT CHANGE UP (ref 2–14)
NEUTROPHILS # BLD AUTO: 0.96 K/UL — LOW (ref 1.8–7.4)
NEUTROPHILS NFR BLD AUTO: 60.8 % — SIGNIFICANT CHANGE UP (ref 43–77)
NRBC # BLD: 0 /100 WBCS — SIGNIFICANT CHANGE UP (ref 0–0)
PHOSPHATE SERPL-MCNC: 1.9 MG/DL — LOW (ref 2.5–4.5)
PLATELET # BLD AUTO: 191 K/UL — SIGNIFICANT CHANGE UP (ref 150–400)
POTASSIUM SERPL-MCNC: 5 MMOL/L — SIGNIFICANT CHANGE UP (ref 3.5–5.3)
POTASSIUM SERPL-SCNC: 5 MMOL/L — SIGNIFICANT CHANGE UP (ref 3.5–5.3)
PROTHROM AB SERPL-ACNC: 43.4 SEC — HIGH (ref 10.5–13.4)
RBC # BLD: 2.5 M/UL — LOW (ref 3.8–5.2)
RBC # FLD: 21.2 % — HIGH (ref 10.3–14.5)
SODIUM SERPL-SCNC: 139 MMOL/L — SIGNIFICANT CHANGE UP (ref 135–145)
TIBC SERPL-MCNC: 230 UG/DL — SIGNIFICANT CHANGE UP (ref 220–430)
UIBC SERPL-MCNC: 105 UG/DL — LOW (ref 110–370)
WBC # BLD: 1.58 K/UL — LOW (ref 3.8–10.5)
WBC # FLD AUTO: 1.58 K/UL — LOW (ref 3.8–10.5)

## 2022-04-24 PROCEDURE — 99233 SBSQ HOSP IP/OBS HIGH 50: CPT

## 2022-04-24 RX ADMIN — TIOTROPIUM BROMIDE 1 CAPSULE(S): 18 CAPSULE ORAL; RESPIRATORY (INHALATION) at 06:12

## 2022-04-24 RX ADMIN — GABAPENTIN 300 MILLIGRAM(S): 400 CAPSULE ORAL at 21:21

## 2022-04-24 RX ADMIN — GABAPENTIN 300 MILLIGRAM(S): 400 CAPSULE ORAL at 13:25

## 2022-04-24 RX ADMIN — Medication 600 MILLIGRAM(S): at 06:04

## 2022-04-24 RX ADMIN — Medication 3 MILLILITER(S): at 14:33

## 2022-04-24 RX ADMIN — Medication 3 MILLILITER(S): at 19:56

## 2022-04-24 RX ADMIN — MONTELUKAST 10 MILLIGRAM(S): 4 TABLET, CHEWABLE ORAL at 13:25

## 2022-04-24 RX ADMIN — Medication 30 MILLIGRAM(S): at 17:57

## 2022-04-24 RX ADMIN — Medication 3 MILLILITER(S): at 07:53

## 2022-04-24 RX ADMIN — Medication 40 MILLIGRAM(S): at 06:05

## 2022-04-24 RX ADMIN — SIMVASTATIN 10 MILLIGRAM(S): 20 TABLET, FILM COATED ORAL at 21:21

## 2022-04-24 RX ADMIN — TRAMADOL HYDROCHLORIDE 50 MILLIGRAM(S): 50 TABLET ORAL at 17:56

## 2022-04-24 RX ADMIN — AMIODARONE HYDROCHLORIDE 100 MILLIGRAM(S): 400 TABLET ORAL at 06:04

## 2022-04-24 RX ADMIN — GABAPENTIN 300 MILLIGRAM(S): 400 CAPSULE ORAL at 06:04

## 2022-04-24 RX ADMIN — Medication 102 MILLIMOLE(S): at 13:25

## 2022-04-24 RX ADMIN — TRAMADOL HYDROCHLORIDE 50 MILLIGRAM(S): 50 TABLET ORAL at 18:41

## 2022-04-24 RX ADMIN — TRAMADOL HYDROCHLORIDE 50 MILLIGRAM(S): 50 TABLET ORAL at 06:04

## 2022-04-24 RX ADMIN — PANTOPRAZOLE SODIUM 40 MILLIGRAM(S): 20 TABLET, DELAYED RELEASE ORAL at 06:04

## 2022-04-24 RX ADMIN — TRAMADOL HYDROCHLORIDE 50 MILLIGRAM(S): 50 TABLET ORAL at 06:52

## 2022-04-24 RX ADMIN — Medication 1 MILLIGRAM(S): at 13:25

## 2022-04-24 RX ADMIN — CEFTRIAXONE 100 MILLIGRAM(S): 500 INJECTION, POWDER, FOR SOLUTION INTRAMUSCULAR; INTRAVENOUS at 21:21

## 2022-04-24 RX ADMIN — Medication 200 MILLIGRAM(S): at 14:18

## 2022-04-24 RX ADMIN — Medication 600 MILLIGRAM(S): at 17:57

## 2022-04-24 RX ADMIN — BUDESONIDE AND FORMOTEROL FUMARATE DIHYDRATE 2 PUFF(S): 160; 4.5 AEROSOL RESPIRATORY (INHALATION) at 06:05

## 2022-04-24 RX ADMIN — BUDESONIDE AND FORMOTEROL FUMARATE DIHYDRATE 2 PUFF(S): 160; 4.5 AEROSOL RESPIRATORY (INHALATION) at 17:57

## 2022-04-24 RX ADMIN — Medication 30 MILLIGRAM(S): at 06:04

## 2022-04-24 NOTE — PROGRESS NOTE ADULT - SUBJECTIVE AND OBJECTIVE BOX
Patient is a 81y old  Female who presents with a chief complaint of superimposed bacterial PNA and COPD exacerbation (23 Apr 2022 19:22)    Patient seen in follow up for CKD 3.        PAST MEDICAL HISTORY:  COPD (chronic obstructive pulmonary disease)    DM (diabetes mellitus)    Pulmonary fibrosis    PAF (paroxysmal atrial fibrillation)    Hiatal hernia    GIB (gastrointestinal bleeding)    Pericarditis    Shoulder fracture, right    Hematoma      MEDICATIONS  (STANDING):  albuterol/ipratropium for Nebulization 3 milliLiter(s) Nebulizer every 6 hours  aMIOdarone    Tablet 100 milliGRAM(s) Oral daily  budesonide  80 MICROgram(s)/formoterol 4.5 MICROgram(s) Inhaler 2 Puff(s) Inhalation two times a day  cefTRIAXone   IVPB 1000 milliGRAM(s) IV Intermittent every 24 hours  cefTRIAXone   IVPB      dextrose 5%. 1000 milliLiter(s) (100 mL/Hr) IV Continuous <Continuous>  dextrose 5%. 1000 milliLiter(s) (50 mL/Hr) IV Continuous <Continuous>  dextrose 50% Injectable 25 Gram(s) IV Push once  dextrose 50% Injectable 12.5 Gram(s) IV Push once  dextrose 50% Injectable 25 Gram(s) IV Push once  diltiazem    Tablet 30 milliGRAM(s) Oral two times a day  folic acid 1 milliGRAM(s) Oral daily  gabapentin 300 milliGRAM(s) Oral three times a day  glucagon  Injectable 1 milliGRAM(s) IntraMuscular once  guaiFENesin  milliGRAM(s) Oral every 12 hours  insulin lispro (ADMELOG) corrective regimen sliding scale   SubCutaneous three times a day before meals  insulin lispro (ADMELOG) corrective regimen sliding scale   SubCutaneous at bedtime  methylPREDNISolone sodium succinate Injectable 40 milliGRAM(s) IV Push daily  montelukast 10 milliGRAM(s) Oral daily  pantoprazole    Tablet 40 milliGRAM(s) Oral before breakfast  simvastatin 10 milliGRAM(s) Oral at bedtime  sodium phosphate IVPB 30 milliMole(s) IV Intermittent once  tiotropium 18 MICROgram(s) Capsule 1 Capsule(s) Inhalation daily  traMADol 50 milliGRAM(s) Oral two times a day    MEDICATIONS  (PRN):  acetaminophen     Tablet .. 650 milliGRAM(s) Oral every 6 hours PRN Temp greater or equal to 38C (100.4F), Mild Pain (1 - 3)  dextrose Oral Gel 15 Gram(s) Oral once PRN Blood Glucose LESS THAN 70 milliGRAM(s)/deciliter  dicyclomine 10 milliGRAM(s) Oral two times a day before meals PRN abd pain  guaiFENesin Oral Liquid (Sugar-Free) 200 milliGRAM(s) Oral every 6 hours PRN Cough    T(C): 36.7 (04-24-22 @ 05:54), Max: 36.7 (04-24-22 @ 05:54)  HR: 63 (04-24-22 @ 07:54) (60 - 82)  BP: 124/66 (04-24-22 @ 06:02) (122/66 - 151/72)  RR: 18 (04-24-22 @ 05:54) (17 - 18)  SpO2: 98% (04-24-22 @ 07:54) (96% - 99%)  Wt(kg): --  I&O's Detail      PHYSICAL EXAM:  General: No distress  Respiratory: b/l air entry  Cardiovascular: S1 S2  Gastrointestinal: soft  Extremities:  no edema                              8.1    1.58  )-----------( 191      ( 24 Apr 2022 08:35 )             25.6     04-24    139  |  105  |  34<H>  ----------------------------<  154<H>  5.0   |  28  |  1.30    Ca    8.4<L>      24 Apr 2022 08:35  Phos  1.9     04-24  Mg     2.4     04-24                Sodium, Serum: 139 (04-24 @ 08:35)  Sodium, Serum: 140 (04-23 @ 09:25)  Sodium, Serum: 140 (04-22 @ 11:11)  Sodium, Serum: 139 (04-21 @ 08:03)    Creatinine, Serum: 1.30 (04-24 @ 08:35)  Creatinine, Serum: 1.30 (04-23 @ 09:25)  Creatinine, Serum: 1.50 (04-22 @ 11:11)  Creatinine, Serum: 1.30 (04-21 @ 08:03)  Creatinine, Serum: 1.50 (04-20 @ 12:19)    Potassium, Serum: 5.0 (04-24 @ 08:35)  Potassium, Serum: 4.5 (04-23 @ 09:25)  Potassium, Serum: 4.3 (04-22 @ 11:11)  Potassium, Serum: 5.0 (04-21 @ 08:03)    Hemoglobin: 8.1 (04-24 @ 08:35)  Hemoglobin: 9.3 (04-23 @ 09:25)  Hemoglobin: 9.1 (04-22 @ 11:11)  Hemoglobin: 8.8 (04-21 @ 08:03)

## 2022-04-24 NOTE — PROGRESS NOTE ADULT - SUBJECTIVE AND OBJECTIVE BOX
CHIEF COMPLAINT/INTERVAL HISTORY:  Pt. seen and evaluated for RUL pneumonia, parainfluenza infection, and acute COPD exacerbation.  Pt. is in no distress.  Respiratory status improving but still with some SOB and wheezing.  Tolerating IV steroids.  Denies having any gross bleeding.      REVIEW OF SYSTEMS:  No fever, CP, or abdominal pain    Vital Signs Last 24 Hrs  T(C): 36.7 (24 Apr 2022 05:54), Max: 36.7 (24 Apr 2022 05:54)  T(F): 98 (24 Apr 2022 05:54), Max: 98 (24 Apr 2022 05:54)  HR: 63 (24 Apr 2022 07:54) (60 - 75)  BP: 124/66 (24 Apr 2022 06:02) (122/66 - 134/60)  BP(mean): --  RR: 18 (24 Apr 2022 05:54) (17 - 18)  SpO2: 98% (24 Apr 2022 07:54) (97% - 99%)    PHYSICAL EXAM:  GENERAL: NAD  HEENT: EOMI, hearing normal, conjunctiva and sclera clear  Chest: diminished BS at bases, no wheezing  CV: S1S2, RRR,   GI: soft, +BS, NT/ND  Musculoskeletal: no edema  Psychiatric: affect nL, mood nL  Skin: warm and dry    LABS:                        8.1    1.58  )-----------( 191      ( 24 Apr 2022 08:35 )             25.6     04-24    139  |  105  |  34<H>  ----------------------------<  154<H>  5.0   |  28  |  1.30    Ca    8.4<L>      24 Apr 2022 08:35  Phos  1.9     04-24  Mg     2.4     04-24      PT/INR - ( 24 Apr 2022 08:35 )   PT: 43.4 sec;   INR: 3.66 ratio

## 2022-04-24 NOTE — PROGRESS NOTE ADULT - SUBJECTIVE AND OBJECTIVE BOX
All interim records and events noted.    continues w cough, SOB and malaise, but all improved since admission  tired    MEDICATIONS  (STANDING):  albuterol/ipratropium for Nebulization 3 milliLiter(s) Nebulizer every 6 hours  aMIOdarone    Tablet 100 milliGRAM(s) Oral daily  budesonide  80 MICROgram(s)/formoterol 4.5 MICROgram(s) Inhaler 2 Puff(s) Inhalation two times a day  cefTRIAXone   IVPB 1000 milliGRAM(s) IV Intermittent every 24 hours  cefTRIAXone   IVPB      dextrose 5%. 1000 milliLiter(s) (100 mL/Hr) IV Continuous <Continuous>  dextrose 5%. 1000 milliLiter(s) (50 mL/Hr) IV Continuous <Continuous>  dextrose 50% Injectable 25 Gram(s) IV Push once  dextrose 50% Injectable 12.5 Gram(s) IV Push once  dextrose 50% Injectable 25 Gram(s) IV Push once  diltiazem    Tablet 30 milliGRAM(s) Oral two times a day  folic acid 1 milliGRAM(s) Oral daily  gabapentin 300 milliGRAM(s) Oral three times a day  glucagon  Injectable 1 milliGRAM(s) IntraMuscular once  guaiFENesin  milliGRAM(s) Oral every 12 hours  insulin lispro (ADMELOG) corrective regimen sliding scale   SubCutaneous three times a day before meals  insulin lispro (ADMELOG) corrective regimen sliding scale   SubCutaneous at bedtime  methylPREDNISolone sodium succinate Injectable 40 milliGRAM(s) IV Push daily  montelukast 10 milliGRAM(s) Oral daily  pantoprazole    Tablet 40 milliGRAM(s) Oral before breakfast  simvastatin 10 milliGRAM(s) Oral at bedtime  tiotropium 18 MICROgram(s) Capsule 1 Capsule(s) Inhalation daily  traMADol 50 milliGRAM(s) Oral two times a day    MEDICATIONS  (PRN):  acetaminophen     Tablet .. 650 milliGRAM(s) Oral every 6 hours PRN Temp greater or equal to 38C (100.4F), Mild Pain (1 - 3)  dextrose Oral Gel 15 Gram(s) Oral once PRN Blood Glucose LESS THAN 70 milliGRAM(s)/deciliter  dicyclomine 10 milliGRAM(s) Oral two times a day before meals PRN abd pain  guaiFENesin Oral Liquid (Sugar-Free) 200 milliGRAM(s) Oral every 6 hours PRN Cough      Vital Signs Last 24 Hrs  T(C): 36.7 (24 Apr 2022 05:54), Max: 36.7 (24 Apr 2022 05:54)  T(F): 98 (24 Apr 2022 05:54), Max: 98 (24 Apr 2022 05:54)  HR: 63 (24 Apr 2022 07:54) (60 - 75)  BP: 124/66 (24 Apr 2022 06:02) (122/66 - 125/70)  BP(mean): --  RR: 18 (24 Apr 2022 05:54) (17 - 18)  SpO2: 98% (24 Apr 2022 07:54) (97% - 99%)    PHYSICAL EXAM  General: well developed  well nourished, in no acute distress  Head: atraumatic, normocephalic  ENT: sclera anicteric, buccal mucosa moist  Neck: supple, trachea midline, no palpable masses  CV: S1 S2, regular rate and rhythm  Lungs: clear to auscultation, no wheezes/rhonchi  Abdomen: soft, nontender, bowel sounds present, no palpable masses. Pouchy  Extrem: no clubbing/cyanosis/edema  Skin: no significant increased ecchymosis/petechiae  Neuro: alert and oriented X3,  no focal deficits      LABS:             8.1    1.58  )-----------( 191      ( 04-24 @ 08:35 )             25.6                9.3    2.08  )-----------( 254      ( 04-23 @ 09:25 )             29.9                9.1    1.42  )-----------( 178      ( 04-22 @ 11:11 )             29.3       04-24    139  |  105  |  34<H>  ----------------------------<  154<H>  5.0   |  28  |  1.30    Ca    8.4<L>      24 Apr 2022 08:35  Phos  1.9     04-24  Mg     2.4     04-24 04-24 @ 08:35  PT43.4 INR3.66  PTT--  04-23 @ 09:25  PT41.1 INR3.47  PTT--  04-22 @ 11:11  PT33.0 INR2.79  PTT--  04-21 @ 13:20  PT29.8 INR2.52  PTT--  04-20 @ 12:19  PT32.9 INR2.78  PTT39.9      RADIOLOGY & ADDITIONAL STUDIES:    IMPRESSION/RECOMMENDATIONS:

## 2022-04-25 LAB
ANION GAP SERPL CALC-SCNC: 7 MMOL/L — SIGNIFICANT CHANGE UP (ref 5–17)
BASOPHILS # BLD AUTO: 0.01 K/UL — SIGNIFICANT CHANGE UP (ref 0–0.2)
BASOPHILS NFR BLD AUTO: 0.4 % — SIGNIFICANT CHANGE UP (ref 0–2)
BUN SERPL-MCNC: 31 MG/DL — HIGH (ref 7–23)
CALCIUM SERPL-MCNC: 9 MG/DL — SIGNIFICANT CHANGE UP (ref 8.5–10.1)
CHLORIDE SERPL-SCNC: 105 MMOL/L — SIGNIFICANT CHANGE UP (ref 96–108)
CO2 SERPL-SCNC: 26 MMOL/L — SIGNIFICANT CHANGE UP (ref 22–31)
CREAT SERPL-MCNC: 1.2 MG/DL — SIGNIFICANT CHANGE UP (ref 0.5–1.3)
CULTURE RESULTS: SIGNIFICANT CHANGE UP
CULTURE RESULTS: SIGNIFICANT CHANGE UP
EGFR: 46 ML/MIN/1.73M2 — LOW
EOSINOPHIL # BLD AUTO: 0 K/UL — SIGNIFICANT CHANGE UP (ref 0–0.5)
EOSINOPHIL NFR BLD AUTO: 0 % — SIGNIFICANT CHANGE UP (ref 0–6)
GLUCOSE SERPL-MCNC: 139 MG/DL — HIGH (ref 70–99)
HCT VFR BLD CALC: 26.7 % — LOW (ref 34.5–45)
HGB BLD-MCNC: 8.6 G/DL — LOW (ref 11.5–15.5)
IMM GRANULOCYTES NFR BLD AUTO: 5.5 % — HIGH (ref 0–1.5)
INR BLD: 3.06 RATIO — HIGH (ref 0.88–1.16)
LYMPHOCYTES # BLD AUTO: 0.51 K/UL — LOW (ref 1–3.3)
LYMPHOCYTES # BLD AUTO: 18.8 % — SIGNIFICANT CHANGE UP (ref 13–44)
MAGNESIUM SERPL-MCNC: 2.4 MG/DL — SIGNIFICANT CHANGE UP (ref 1.6–2.6)
MCHC RBC-ENTMCNC: 32.2 GM/DL — SIGNIFICANT CHANGE UP (ref 32–36)
MCHC RBC-ENTMCNC: 32.3 PG — SIGNIFICANT CHANGE UP (ref 27–34)
MCV RBC AUTO: 100.4 FL — HIGH (ref 80–100)
MONOCYTES # BLD AUTO: 0.13 K/UL — SIGNIFICANT CHANGE UP (ref 0–0.9)
MONOCYTES NFR BLD AUTO: 4.8 % — SIGNIFICANT CHANGE UP (ref 2–14)
MRSA PCR RESULT.: SIGNIFICANT CHANGE UP
NEUTROPHILS # BLD AUTO: 1.92 K/UL — SIGNIFICANT CHANGE UP (ref 1.8–7.4)
NEUTROPHILS NFR BLD AUTO: 70.5 % — SIGNIFICANT CHANGE UP (ref 43–77)
NRBC # BLD: 0 /100 WBCS — SIGNIFICANT CHANGE UP (ref 0–0)
PHOSPHATE SERPL-MCNC: 2.5 MG/DL — SIGNIFICANT CHANGE UP (ref 2.5–4.5)
PLATELET # BLD AUTO: 213 K/UL — SIGNIFICANT CHANGE UP (ref 150–400)
POTASSIUM SERPL-MCNC: 4.6 MMOL/L — SIGNIFICANT CHANGE UP (ref 3.5–5.3)
POTASSIUM SERPL-SCNC: 4.6 MMOL/L — SIGNIFICANT CHANGE UP (ref 3.5–5.3)
PROTHROM AB SERPL-ACNC: 36.2 SEC — HIGH (ref 10.5–13.4)
RBC # BLD: 2.66 M/UL — LOW (ref 3.8–5.2)
RBC # FLD: 20.7 % — HIGH (ref 10.3–14.5)
S AUREUS DNA NOSE QL NAA+PROBE: SIGNIFICANT CHANGE UP
SODIUM SERPL-SCNC: 138 MMOL/L — SIGNIFICANT CHANGE UP (ref 135–145)
SPECIMEN SOURCE: SIGNIFICANT CHANGE UP
SPECIMEN SOURCE: SIGNIFICANT CHANGE UP
WBC # BLD: 2.72 K/UL — LOW (ref 3.8–10.5)
WBC # FLD AUTO: 2.72 K/UL — LOW (ref 3.8–10.5)

## 2022-04-25 PROCEDURE — 99232 SBSQ HOSP IP/OBS MODERATE 35: CPT

## 2022-04-25 RX ORDER — ACETAMINOPHEN 500 MG
650 TABLET ORAL ONCE
Refills: 0 | Status: COMPLETED | OUTPATIENT
Start: 2022-04-25 | End: 2022-04-25

## 2022-04-25 RX ORDER — IMMUNE GLOBULIN (HUMAN) 10 G/100ML
35 INJECTION INTRAVENOUS; SUBCUTANEOUS ONCE
Refills: 0 | Status: COMPLETED | OUTPATIENT
Start: 2022-04-25 | End: 2022-04-25

## 2022-04-25 RX ORDER — ERYTHROPOIETIN 10000 [IU]/ML
10000 INJECTION, SOLUTION INTRAVENOUS; SUBCUTANEOUS ONCE
Refills: 0 | Status: COMPLETED | OUTPATIENT
Start: 2022-04-25 | End: 2022-04-25

## 2022-04-25 RX ORDER — DIPHENHYDRAMINE HCL 50 MG
50 CAPSULE ORAL ONCE
Refills: 0 | Status: COMPLETED | OUTPATIENT
Start: 2022-04-25 | End: 2022-04-25

## 2022-04-25 RX ADMIN — Medication 102 MILLIGRAM(S): at 13:03

## 2022-04-25 RX ADMIN — Medication 3 MILLILITER(S): at 20:24

## 2022-04-25 RX ADMIN — Medication 650 MILLIGRAM(S): at 13:08

## 2022-04-25 RX ADMIN — AMIODARONE HYDROCHLORIDE 100 MILLIGRAM(S): 400 TABLET ORAL at 05:13

## 2022-04-25 RX ADMIN — TRAMADOL HYDROCHLORIDE 50 MILLIGRAM(S): 50 TABLET ORAL at 06:10

## 2022-04-25 RX ADMIN — TRAMADOL HYDROCHLORIDE 50 MILLIGRAM(S): 50 TABLET ORAL at 18:11

## 2022-04-25 RX ADMIN — GABAPENTIN 300 MILLIGRAM(S): 400 CAPSULE ORAL at 13:25

## 2022-04-25 RX ADMIN — Medication 30 MILLIGRAM(S): at 05:12

## 2022-04-25 RX ADMIN — GABAPENTIN 300 MILLIGRAM(S): 400 CAPSULE ORAL at 05:12

## 2022-04-25 RX ADMIN — TRAMADOL HYDROCHLORIDE 50 MILLIGRAM(S): 50 TABLET ORAL at 05:12

## 2022-04-25 RX ADMIN — TRAMADOL HYDROCHLORIDE 50 MILLIGRAM(S): 50 TABLET ORAL at 17:11

## 2022-04-25 RX ADMIN — TIOTROPIUM BROMIDE 1 CAPSULE(S): 18 CAPSULE ORAL; RESPIRATORY (INHALATION) at 05:17

## 2022-04-25 RX ADMIN — GABAPENTIN 300 MILLIGRAM(S): 400 CAPSULE ORAL at 21:08

## 2022-04-25 RX ADMIN — ERYTHROPOIETIN 10000 UNIT(S): 10000 INJECTION, SOLUTION INTRAVENOUS; SUBCUTANEOUS at 13:25

## 2022-04-25 RX ADMIN — CEFTRIAXONE 100 MILLIGRAM(S): 500 INJECTION, POWDER, FOR SOLUTION INTRAMUSCULAR; INTRAVENOUS at 21:07

## 2022-04-25 RX ADMIN — Medication 30 MILLIGRAM(S): at 17:10

## 2022-04-25 RX ADMIN — MONTELUKAST 10 MILLIGRAM(S): 4 TABLET, CHEWABLE ORAL at 11:41

## 2022-04-25 RX ADMIN — SIMVASTATIN 10 MILLIGRAM(S): 20 TABLET, FILM COATED ORAL at 21:09

## 2022-04-25 RX ADMIN — BUDESONIDE AND FORMOTEROL FUMARATE DIHYDRATE 2 PUFF(S): 160; 4.5 AEROSOL RESPIRATORY (INHALATION) at 18:59

## 2022-04-25 RX ADMIN — BUDESONIDE AND FORMOTEROL FUMARATE DIHYDRATE 2 PUFF(S): 160; 4.5 AEROSOL RESPIRATORY (INHALATION) at 05:17

## 2022-04-25 RX ADMIN — Medication 3 MILLILITER(S): at 13:19

## 2022-04-25 RX ADMIN — Medication 1 MILLIGRAM(S): at 11:41

## 2022-04-25 RX ADMIN — Medication 600 MILLIGRAM(S): at 05:13

## 2022-04-25 RX ADMIN — Medication 600 MILLIGRAM(S): at 17:10

## 2022-04-25 RX ADMIN — PANTOPRAZOLE SODIUM 40 MILLIGRAM(S): 20 TABLET, DELAYED RELEASE ORAL at 05:12

## 2022-04-25 RX ADMIN — Medication 650 MILLIGRAM(S): at 14:08

## 2022-04-25 RX ADMIN — IMMUNE GLOBULIN (HUMAN) 116.67 GRAM(S): 10 INJECTION INTRAVENOUS; SUBCUTANEOUS at 13:52

## 2022-04-25 RX ADMIN — Medication 3 MILLILITER(S): at 08:02

## 2022-04-25 RX ADMIN — Medication 40 MILLIGRAM(S): at 05:13

## 2022-04-25 NOTE — PROGRESS NOTE ADULT - SUBJECTIVE AND OBJECTIVE BOX
Patient is a 81y old  Female who presents with a chief complaint of superimposed bacterial PNA and COPD exacerbation (23 Apr 2022 19:22)    Patient seen in follow up for CKD 3.        PAST MEDICAL HISTORY:  COPD (chronic obstructive pulmonary disease)    DM (diabetes mellitus)    Pulmonary fibrosis    PAF (paroxysmal atrial fibrillation)    Hiatal hernia    GIB (gastrointestinal bleeding)    Pericarditis    Shoulder fracture, right    Hematoma      MEDICATIONS  (STANDING):  acetaminophen     Tablet .. 650 milliGRAM(s) Oral once  albuterol/ipratropium for Nebulization 3 milliLiter(s) Nebulizer every 6 hours  aMIOdarone    Tablet 100 milliGRAM(s) Oral daily  budesonide  80 MICROgram(s)/formoterol 4.5 MICROgram(s) Inhaler 2 Puff(s) Inhalation two times a day  cefTRIAXone   IVPB 1000 milliGRAM(s) IV Intermittent every 24 hours  cefTRIAXone   IVPB      dextrose 5%. 1000 milliLiter(s) (50 mL/Hr) IV Continuous <Continuous>  dextrose 5%. 1000 milliLiter(s) (100 mL/Hr) IV Continuous <Continuous>  dextrose 50% Injectable 25 Gram(s) IV Push once  dextrose 50% Injectable 12.5 Gram(s) IV Push once  dextrose 50% Injectable 25 Gram(s) IV Push once  diltiazem    Tablet 30 milliGRAM(s) Oral two times a day  diphenhydrAMINE IVPB 50 milliGRAM(s) IV Intermittent once  epoetin maritza-epbx (RETACRIT) Injectable 75403 Unit(s) SubCutaneous once  folic acid 1 milliGRAM(s) Oral daily  gabapentin 300 milliGRAM(s) Oral three times a day  glucagon  Injectable 1 milliGRAM(s) IntraMuscular once  guaiFENesin  milliGRAM(s) Oral every 12 hours  immune   globulin 10% (GAMMAGARD) IVPB 35 Gram(s) IV Intermittent once  insulin lispro (ADMELOG) corrective regimen sliding scale   SubCutaneous three times a day before meals  insulin lispro (ADMELOG) corrective regimen sliding scale   SubCutaneous at bedtime  methylPREDNISolone sodium succinate Injectable 40 milliGRAM(s) IV Push daily  montelukast 10 milliGRAM(s) Oral daily  pantoprazole    Tablet 40 milliGRAM(s) Oral before breakfast  simvastatin 10 milliGRAM(s) Oral at bedtime  tiotropium 18 MICROgram(s) Capsule 1 Capsule(s) Inhalation daily  traMADol 50 milliGRAM(s) Oral two times a day    MEDICATIONS  (PRN):  acetaminophen     Tablet .. 650 milliGRAM(s) Oral every 6 hours PRN Temp greater or equal to 38C (100.4F), Mild Pain (1 - 3)  dextrose Oral Gel 15 Gram(s) Oral once PRN Blood Glucose LESS THAN 70 milliGRAM(s)/deciliter  dicyclomine 10 milliGRAM(s) Oral two times a day before meals PRN abd pain  guaiFENesin Oral Liquid (Sugar-Free) 200 milliGRAM(s) Oral every 6 hours PRN Cough    T(C): 36.8 (04-25-22 @ 04:29), Max: 36.8 (04-24-22 @ 20:25)  HR: 63 (04-25-22 @ 04:29) (58 - 75)  BP: 137/69 (04-25-22 @ 04:29) (122/66 - 137/69)  RR: 17 (04-25-22 @ 04:29)  SpO2: 97% (04-25-22 @ 08:10)  Wt(kg): --  I&O's Detail      PHYSICAL EXAM:  General: No distress  Respiratory: b/l air entry  Cardiovascular: S1 S2  Gastrointestinal: soft  Extremities:  no edema                              LABORATORY:                        8.6    2.72  )-----------( 213      ( 25 Apr 2022 09:14 )             26.7     04-25    138  |  105  |  31<H>  ----------------------------<  139<H>  4.6   |  26  |  1.20    Ca    9.0      25 Apr 2022 09:14  Phos  2.5     04-25  Mg     2.4     04-25      Sodium, Serum: 138 mmol/L (04-25 @ 09:14)  Sodium, Serum: 139 mmol/L (04-24 @ 08:35)    Potassium, Serum: 4.6 mmol/L (04-25 @ 09:14)  Potassium, Serum: 5.0 mmol/L (04-24 @ 08:35)    Hemoglobin: 8.6 g/dL (04-25 @ 09:14)  Hemoglobin: 8.3 g/dL (04-24 @ 16:46)  Hemoglobin: 8.1 g/dL (04-24 @ 08:35)  Hemoglobin: 9.3 g/dL (04-23 @ 09:25)    Creatinine, Serum 1.20 (04-25 @ 09:14)  Creatinine, Serum 1.30 (04-24 @ 08:35)  Creatinine, Serum 1.30 (04-23 @ 09:25)

## 2022-04-25 NOTE — PROGRESS NOTE ADULT - SUBJECTIVE AND OBJECTIVE BOX
Patient is a 81y old  Female who presents with a chief complaint of superimposed bacterial PNA and COPD exacerbation (25 Apr 2022 18:29)      INTERVAL HPI/OVERNIGHT EVENTS:    admits to cough with wheezing and chest congestion    MEDICATIONS  (STANDING):  albuterol/ipratropium for Nebulization 3 milliLiter(s) Nebulizer every 6 hours  aMIOdarone    Tablet 100 milliGRAM(s) Oral daily  budesonide  80 MICROgram(s)/formoterol 4.5 MICROgram(s) Inhaler 2 Puff(s) Inhalation two times a day  dextrose 5%. 1000 milliLiter(s) (100 mL/Hr) IV Continuous <Continuous>  dextrose 5%. 1000 milliLiter(s) (50 mL/Hr) IV Continuous <Continuous>  dextrose 50% Injectable 25 Gram(s) IV Push once  dextrose 50% Injectable 12.5 Gram(s) IV Push once  dextrose 50% Injectable 25 Gram(s) IV Push once  diltiazem    Tablet 30 milliGRAM(s) Oral two times a day  folic acid 1 milliGRAM(s) Oral daily  gabapentin 300 milliGRAM(s) Oral three times a day  glucagon  Injectable 1 milliGRAM(s) IntraMuscular once  guaiFENesin  milliGRAM(s) Oral every 12 hours  insulin lispro (ADMELOG) corrective regimen sliding scale   SubCutaneous at bedtime  insulin lispro (ADMELOG) corrective regimen sliding scale   SubCutaneous three times a day before meals  methylPREDNISolone sodium succinate Injectable 40 milliGRAM(s) IV Push daily  montelukast 10 milliGRAM(s) Oral daily  pantoprazole    Tablet 40 milliGRAM(s) Oral before breakfast  simvastatin 10 milliGRAM(s) Oral at bedtime  tiotropium 18 MICROgram(s) Capsule 1 Capsule(s) Inhalation daily  traMADol 50 milliGRAM(s) Oral two times a day      MEDICATIONS  (PRN):  acetaminophen     Tablet .. 650 milliGRAM(s) Oral every 6 hours PRN Temp greater or equal to 38C (100.4F), Mild Pain (1 - 3)  dextrose Oral Gel 15 Gram(s) Oral once PRN Blood Glucose LESS THAN 70 milliGRAM(s)/deciliter  dicyclomine 10 milliGRAM(s) Oral two times a day before meals PRN abd pain  guaiFENesin Oral Liquid (Sugar-Free) 200 milliGRAM(s) Oral every 6 hours PRN Cough      Allergies    No Known Allergies    Intolerances        PAST MEDICAL & SURGICAL HISTORY:  COPD (chronic obstructive pulmonary disease)    DM (diabetes mellitus)    PAF (paroxysmal atrial fibrillation)  s/p ablation    Hiatal hernia    GIB (gastrointestinal bleeding)    Pericarditis    Shoulder fracture, right    Hematoma  Hematoma of Right wrist    S/P hysterectomy    S/P foot surgery    S/P knee surgery    After cataract  bilateral eye cataract surgically removed    Closed right hip fracture  rigth hip ORIF july 19 2016    S/P IVC filter  nov 2016    S/P carpal tunnel release  Right 2008 &amp; 6/27/17        Vital Signs Last 24 Hrs  T(C): 36.6 (25 Apr 2022 20:44), Max: 36.9 (25 Apr 2022 12:39)  T(F): 97.9 (25 Apr 2022 20:44), Max: 98.4 (25 Apr 2022 12:39)  HR: 64 (25 Apr 2022 20:44) (63 - 78)  BP: 150/68 (25 Apr 2022 20:44) (131/75 - 171/79)  BP(mean): --  RR: 18 (25 Apr 2022 20:44) (17 - 18)  SpO2: 98% (25 Apr 2022 20:44) (97% - 99%)    PHYSICAL EXAMINATION:    GENERAL: The patient is awake and alert in no apparent distress.     HEENT: Head is normocephalic and atraumatic    NECK: no JVD    LUNGS: Bilateral expiratory wheezes with prolonged expiration    HEART: Regular rate and rhythm without murmur.    ABDOMEN: Soft, nontender, and nondistended.      EXTREMITIES: Without any cyanosis, clubbing, rash, lesions or edema.    NEUROLOGIC: Grossly intact.    SKIN: No ulceration or induration present.      LABS:                        8.6    2.72  )-----------( 213      ( 25 Apr 2022 09:14 )             26.7     04-25    138  |  105  |  31<H>  ----------------------------<  139<H>  4.6   |  26  |  1.20    Ca    9.0      25 Apr 2022 09:14  Phos  2.5     04-25  Mg     2.4     04-25      PT/INR - ( 25 Apr 2022 09:14 )   PT: 36.2 sec;   INR: 3.06 ratio          Assessment:    COPD with exacerbation  Parainfluenza Bronchitis  Pancytopenia  Hx Pulmonary emboli - S/P IVC filter  PAF  Acute Hypoxic respiratory failure  Diabetes    Plan:    Change to oral steroids  Supplemental oxygen  Anticoagulation  Symbicort + Spiriva inhalers  Cover blood sugars  Not ready for discharge

## 2022-04-25 NOTE — PROGRESS NOTE ADULT - SUBJECTIVE AND OBJECTIVE BOX
Patient seen and examined;  Chart reviewed and events noted;   Continues to have cough/wheezing; anxious about her low H/H      MEDICATIONS  (STANDING):  acetaminophen     Tablet .. 650 milliGRAM(s) Oral once  albuterol/ipratropium for Nebulization 3 milliLiter(s) Nebulizer every 6 hours  aMIOdarone    Tablet 100 milliGRAM(s) Oral daily  budesonide  80 MICROgram(s)/formoterol 4.5 MICROgram(s) Inhaler 2 Puff(s) Inhalation two times a day  cefTRIAXone   IVPB 1000 milliGRAM(s) IV Intermittent every 24 hours  dextrose 5%. 1000 milliLiter(s) (50 mL/Hr) IV Continuous <Continuous>  dextrose 5%. 1000 milliLiter(s) (100 mL/Hr) IV Continuous <Continuous>  diltiazem    Tablet 30 milliGRAM(s) Oral two times a day  diphenhydrAMINE IVPB 50 milliGRAM(s) IV Intermittent once  folic acid 1 milliGRAM(s) Oral daily  gabapentin 300 milliGRAM(s) Oral three times a day  glucagon  Injectable 1 milliGRAM(s) IntraMuscular once  guaiFENesin  milliGRAM(s) Oral every 12 hours  immune   globulin 10% (GAMMAGARD) IVPB 35 Gram(s) IV Intermittent once  insulin lispro (ADMELOG) corrective regimen sliding scale   SubCutaneous three times a day before meals  insulin lispro (ADMELOG) corrective regimen sliding scale   SubCutaneous at bedtime  methylPREDNISolone sodium succinate Injectable 40 milliGRAM(s) IV Push daily  montelukast 10 milliGRAM(s) Oral daily  pantoprazole    Tablet 40 milliGRAM(s) Oral before breakfast  simvastatin 10 milliGRAM(s) Oral at bedtime  tiotropium 18 MICROgram(s) Capsule 1 Capsule(s) Inhalation daily  traMADol 50 milliGRAM(s) Oral two times a day    MEDICATIONS  (PRN):  acetaminophen     Tablet .. 650 milliGRAM(s) Oral every 6 hours PRN Temp greater or equal to 38C (100.4F), Mild Pain (1 - 3)  dextrose Oral Gel 15 Gram(s) Oral once PRN Blood Glucose LESS THAN 70 milliGRAM(s)/deciliter  dicyclomine 10 milliGRAM(s) Oral two times a day before meals PRN abd pain  guaiFENesin Oral Liquid (Sugar-Free) 200 milliGRAM(s) Oral every 6 hours PRN Cough      Vital Signs Last 24 Hrs  T(C): 36.8 (25 Apr 2022 04:29), Max: 36.8 (24 Apr 2022 20:25)  T(F): 98.2 (25 Apr 2022 04:29), Max: 98.3 (24 Apr 2022 20:25)  HR: 63 (25 Apr 2022 04:29) (58 - 70)  BP: 137/69 (25 Apr 2022 04:29) (124/67 - 137/69)  BP(mean): --  RR: 17 (25 Apr 2022 04:29) (17 - 18)  SpO2: 97% (25 Apr 2022 08:10) (97% - 98%)    PHYSICAL EXAM  General: adult in NAD  HEENT: clear oropharynx, anicteric sclera, pink conjunctivae  Neck: supple  CV: normal S1S2 with no murmur rubs or gallops  Lungs: + expiratory wheezes, no rhales  Abdomen: soft non-tender non-distended, no hepato/splenomegaly  Ext: no clubbing cyanosis or edema  Skin: no rashes and no petichiae  Neuro: alert and oriented X3 no focal deficits      LABS:                        8.6    2.72  )-----------( 213      ( 25 Apr 2022 09:14 )             26.7     Hemoglobin: 8.6 g/dL (04-25 @ 09:14)  Hemoglobin: 8.3 g/dL (04-24 @ 16:46)  Hemoglobin: 8.1 g/dL (04-24 @ 08:35)  Hemoglobin: 9.3 g/dL (04-23 @ 09:25)  Hemoglobin: 9.1 g/dL (04-22 @ 11:11)    WBC Count: 2.72 K/uL (04-25 @ 09:14)  WBC Count: 1.58 K/uL (04-24 @ 08:35)  WBC Count: 2.08 K/uL (04-23 @ 09:25)  WBC Count: 1.42 K/uL (04-22 @ 11:11)  WBC Count: 1.70 K/uL (04-21 @ 08:03)    04-25    138  |  105  |  31<H>  ----------------------------<  139<H>  4.6   |  26  |  1.20    Ca    9.0      25 Apr 2022 09:14  Phos  2.5     04-25  Mg     2.4     04-25      PT/INR - ( 25 Apr 2022 09:14 )   PT: 36.2 sec;   INR: 3.06 ratio      iron 125, TIBC 230, iron sat 54  Ferritin 1349

## 2022-04-25 NOTE — PROGRESS NOTE ADULT - SUBJECTIVE AND OBJECTIVE BOX
CC:  Parainfluenza pneumonia, COPD exac. Superimposed bacterial pneumonia.  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 Pt's daughter at bedside. States that for the past few days, pt has been dealing with a respiratory infection and low grade fever. However, symptoms persisted so yesterday, patient was started on antibiotics. This morning, pt was noted to be confused with a temp of 104F. Daughter called Dr. Gallegos (pulm) and was told to bring pt to the ED. Pt reports that she still has mild dyspnea. Unable to speak in full sentences at the moment. Pt was scheduled to have a blood transfusion tomorrow. Heme/onc called by ED and was attempted to be given blood transfusion in ER but patient spiked a high fever and transfusion was stopped. Of note, pt has been receiving IVIG infusions. s/p IVIG on March 18th and next scheduled infusion was 4/25. NO sick contacts.    In the ED, VS T102.9F HR 98 /76 RR 30 SpO2 98% on RA. Labs significant for leukopenia 2.29, anemia 8.5/26.2, thrombocytopenia 106, BUN/Cr 25/1.5, RVP parainfluenza positive.  Received Zosyn x1, 1.7L NS bolus x1, tylenol 975 mg PO x2, duoneb x1 in the ED.  CXR showing RUL PNA  EKG NSR with nonspecific ST abnormality unchanged from prior EKG (20 Apr 2022 20:01)    REVIEW OF SYSTEMS:    Patient denied fever, chills, abdominal pain, nausea, vomiting, cough, shortness of breath, chest pain or palpitations    Vital Signs Last 24 Hrs  T(C): 36.9 (25 Apr 2022 12:39), Max: 36.9 (25 Apr 2022 12:39)  T(F): 98.4 (25 Apr 2022 12:39), Max: 98.4 (25 Apr 2022 12:39)  HR: 78 (25 Apr 2022 17:40) (62 - 78)  BP: 171/79 (25 Apr 2022 17:40) (124/68 - 171/79)  BP(mean): --  RR: 18 (25 Apr 2022 12:39) (17 - 18)  SpO2: 97% (25 Apr 2022 12:39) (97% - 98%)I&O's Summary    25 Apr 2022 07:01  -  25 Apr 2022 18:29  --------------------------------------------------------  IN: 400 mL / OUT: 0 mL / NET: 400 mL      PHYSICAL EXAM:  GENERAL: NAD,   HEENT: PERRL, +EOMI, anicteric, no [place]  NECK: Supple, No JVD   CHEST/LUNG: bilateral rales   HEART: S1S2 Normal intensity, no murmurs, gallops or rubs noted  ABDOMEN: Soft, BS Normoactive, NT, ND, no HSM noted  EXTREMITIES:  2+ radial and DP pulses noted, no clubbing, cyanosis, or edema noted, Limited mobility   SKIN: No rashes or lesions noted  NEURO: Lethargy, no focal deficits noted, CN II-XII intact  PSYCH: Depressed mood and affect; insight/judgement appropriate  LABS:                        8.6    2.72  )-----------( 213      ( 25 Apr 2022 09:14 )             26.7     04-25    138  |  105  |  31<H>  ----------------------------<  139<H>  4.6   |  26  |  1.20    Ca    9.0      25 Apr 2022 09:14  Phos  2.5     04-25  Mg     2.4     04-25      PT/INR - ( 25 Apr 2022 09:14 )   PT: 36.2 sec;   INR: 3.06 ratio             RADIOLOGY & ADDITIONAL TESTS:    MEDICATIONS:  MEDICATIONS  (STANDING):  albuterol/ipratropium for Nebulization 3 milliLiter(s) Nebulizer every 6 hours  aMIOdarone    Tablet 100 milliGRAM(s) Oral daily  budesonide  80 MICROgram(s)/formoterol 4.5 MICROgram(s) Inhaler 2 Puff(s) Inhalation two times a day  cefTRIAXone   IVPB 1000 milliGRAM(s) IV Intermittent every 24 hours  cefTRIAXone   IVPB      dextrose 5%. 1000 milliLiter(s) (100 mL/Hr) IV Continuous <Continuous>  dextrose 5%. 1000 milliLiter(s) (50 mL/Hr) IV Continuous <Continuous>  dextrose 50% Injectable 25 Gram(s) IV Push once  dextrose 50% Injectable 12.5 Gram(s) IV Push once  dextrose 50% Injectable 25 Gram(s) IV Push once  diltiazem    Tablet 30 milliGRAM(s) Oral two times a day  folic acid 1 milliGRAM(s) Oral daily  gabapentin 300 milliGRAM(s) Oral three times a day  glucagon  Injectable 1 milliGRAM(s) IntraMuscular once  guaiFENesin  milliGRAM(s) Oral every 12 hours  insulin lispro (ADMELOG) corrective regimen sliding scale   SubCutaneous three times a day before meals  insulin lispro (ADMELOG) corrective regimen sliding scale   SubCutaneous at bedtime  methylPREDNISolone sodium succinate Injectable 40 milliGRAM(s) IV Push daily  montelukast 10 milliGRAM(s) Oral daily  pantoprazole    Tablet 40 milliGRAM(s) Oral before breakfast  simvastatin 10 milliGRAM(s) Oral at bedtime  tiotropium 18 MICROgram(s) Capsule 1 Capsule(s) Inhalation daily  traMADol 50 milliGRAM(s) Oral two times a day    MEDICATIONS  (PRN):  acetaminophen     Tablet .. 650 milliGRAM(s) Oral every 6 hours PRN Temp greater or equal to 38C (100.4F), Mild Pain (1 - 3)  dextrose Oral Gel 15 Gram(s) Oral once PRN Blood Glucose LESS THAN 70 milliGRAM(s)/deciliter  dicyclomine 10 milliGRAM(s) Oral two times a day before meals PRN abd pain  guaiFENesin Oral Liquid (Sugar-Free) 200 milliGRAM(s) Oral every 6 hours PRN Cough

## 2022-04-26 LAB
ALBUMIN SERPL ELPH-MCNC: 3 G/DL — LOW (ref 3.3–5)
ALP SERPL-CCNC: 34 U/L — LOW (ref 40–120)
ALT FLD-CCNC: 16 U/L — SIGNIFICANT CHANGE UP (ref 12–78)
ANION GAP SERPL CALC-SCNC: 3 MMOL/L — LOW (ref 5–17)
AST SERPL-CCNC: 5 U/L — LOW (ref 15–37)
BILIRUB SERPL-MCNC: 0.4 MG/DL — SIGNIFICANT CHANGE UP (ref 0.2–1.2)
BUN SERPL-MCNC: 30 MG/DL — HIGH (ref 7–23)
CALCIUM SERPL-MCNC: 9 MG/DL — SIGNIFICANT CHANGE UP (ref 8.5–10.1)
CHLORIDE SERPL-SCNC: 105 MMOL/L — SIGNIFICANT CHANGE UP (ref 96–108)
CO2 SERPL-SCNC: 29 MMOL/L — SIGNIFICANT CHANGE UP (ref 22–31)
CREAT SERPL-MCNC: 1.2 MG/DL — SIGNIFICANT CHANGE UP (ref 0.5–1.3)
EGFR: 46 ML/MIN/1.73M2 — LOW
GLUCOSE SERPL-MCNC: 109 MG/DL — HIGH (ref 70–99)
HCT VFR BLD CALC: 24.8 % — LOW (ref 34.5–45)
HGB BLD-MCNC: 7.8 G/DL — LOW (ref 11.5–15.5)
INR BLD: 2.29 RATIO — HIGH (ref 0.88–1.16)
MCHC RBC-ENTMCNC: 31.5 GM/DL — LOW (ref 32–36)
MCHC RBC-ENTMCNC: 32 PG — SIGNIFICANT CHANGE UP (ref 27–34)
MCV RBC AUTO: 101.6 FL — HIGH (ref 80–100)
NRBC # BLD: 0 /100 WBCS — SIGNIFICANT CHANGE UP (ref 0–0)
PLATELET # BLD AUTO: 175 K/UL — SIGNIFICANT CHANGE UP (ref 150–400)
POTASSIUM SERPL-MCNC: 4.7 MMOL/L — SIGNIFICANT CHANGE UP (ref 3.5–5.3)
POTASSIUM SERPL-SCNC: 4.7 MMOL/L — SIGNIFICANT CHANGE UP (ref 3.5–5.3)
PROT SERPL-MCNC: 6.3 G/DL — SIGNIFICANT CHANGE UP (ref 6–8.3)
PROTHROM AB SERPL-ACNC: 27 SEC — HIGH (ref 10.5–13.4)
RBC # BLD: 2.44 M/UL — LOW (ref 3.8–5.2)
RBC # FLD: 20.5 % — HIGH (ref 10.3–14.5)
SODIUM SERPL-SCNC: 137 MMOL/L — SIGNIFICANT CHANGE UP (ref 135–145)
WBC # BLD: 5.39 K/UL — SIGNIFICANT CHANGE UP (ref 3.8–10.5)
WBC # FLD AUTO: 5.39 K/UL — SIGNIFICANT CHANGE UP (ref 3.8–10.5)

## 2022-04-26 PROCEDURE — 99233 SBSQ HOSP IP/OBS HIGH 50: CPT

## 2022-04-26 RX ORDER — WARFARIN SODIUM 2.5 MG/1
2 TABLET ORAL ONCE
Refills: 0 | Status: COMPLETED | OUTPATIENT
Start: 2022-04-26 | End: 2022-04-26

## 2022-04-26 RX ADMIN — TRAMADOL HYDROCHLORIDE 50 MILLIGRAM(S): 50 TABLET ORAL at 06:53

## 2022-04-26 RX ADMIN — PANTOPRAZOLE SODIUM 40 MILLIGRAM(S): 20 TABLET, DELAYED RELEASE ORAL at 05:39

## 2022-04-26 RX ADMIN — Medication 600 MILLIGRAM(S): at 17:01

## 2022-04-26 RX ADMIN — Medication 30 MILLIGRAM(S): at 17:01

## 2022-04-26 RX ADMIN — GABAPENTIN 300 MILLIGRAM(S): 400 CAPSULE ORAL at 21:40

## 2022-04-26 RX ADMIN — TIOTROPIUM BROMIDE 1 CAPSULE(S): 18 CAPSULE ORAL; RESPIRATORY (INHALATION) at 06:49

## 2022-04-26 RX ADMIN — MONTELUKAST 10 MILLIGRAM(S): 4 TABLET, CHEWABLE ORAL at 12:02

## 2022-04-26 RX ADMIN — Medication 3 MILLILITER(S): at 00:19

## 2022-04-26 RX ADMIN — BUDESONIDE AND FORMOTEROL FUMARATE DIHYDRATE 2 PUFF(S): 160; 4.5 AEROSOL RESPIRATORY (INHALATION) at 19:56

## 2022-04-26 RX ADMIN — Medication 30 MILLIGRAM(S): at 05:36

## 2022-04-26 RX ADMIN — Medication 3 MILLILITER(S): at 12:59

## 2022-04-26 RX ADMIN — AMIODARONE HYDROCHLORIDE 100 MILLIGRAM(S): 400 TABLET ORAL at 05:36

## 2022-04-26 RX ADMIN — Medication 3 MILLILITER(S): at 20:24

## 2022-04-26 RX ADMIN — TRAMADOL HYDROCHLORIDE 50 MILLIGRAM(S): 50 TABLET ORAL at 05:35

## 2022-04-26 RX ADMIN — TRAMADOL HYDROCHLORIDE 50 MILLIGRAM(S): 50 TABLET ORAL at 17:31

## 2022-04-26 RX ADMIN — GABAPENTIN 300 MILLIGRAM(S): 400 CAPSULE ORAL at 05:36

## 2022-04-26 RX ADMIN — Medication 1 MILLIGRAM(S): at 12:02

## 2022-04-26 RX ADMIN — WARFARIN SODIUM 2 MILLIGRAM(S): 2.5 TABLET ORAL at 21:41

## 2022-04-26 RX ADMIN — TRAMADOL HYDROCHLORIDE 50 MILLIGRAM(S): 50 TABLET ORAL at 17:01

## 2022-04-26 RX ADMIN — BUDESONIDE AND FORMOTEROL FUMARATE DIHYDRATE 2 PUFF(S): 160; 4.5 AEROSOL RESPIRATORY (INHALATION) at 06:48

## 2022-04-26 RX ADMIN — Medication 600 MILLIGRAM(S): at 05:37

## 2022-04-26 RX ADMIN — Medication 40 MILLIGRAM(S): at 06:48

## 2022-04-26 RX ADMIN — GABAPENTIN 300 MILLIGRAM(S): 400 CAPSULE ORAL at 13:32

## 2022-04-26 RX ADMIN — Medication 3 MILLILITER(S): at 08:07

## 2022-04-26 RX ADMIN — SIMVASTATIN 10 MILLIGRAM(S): 20 TABLET, FILM COATED ORAL at 21:40

## 2022-04-26 NOTE — PROGRESS NOTE ADULT - SUBJECTIVE AND OBJECTIVE BOX
All interim records and events noted.    feeling improved, although still w wheezes   present      MEDICATIONS  (STANDING):  albuterol/ipratropium for Nebulization 3 milliLiter(s) Nebulizer every 6 hours  aMIOdarone    Tablet 100 milliGRAM(s) Oral daily  budesonide  80 MICROgram(s)/formoterol 4.5 MICROgram(s) Inhaler 2 Puff(s) Inhalation two times a day  dextrose 5%. 1000 milliLiter(s) (100 mL/Hr) IV Continuous <Continuous>  dextrose 5%. 1000 milliLiter(s) (50 mL/Hr) IV Continuous <Continuous>  dextrose 50% Injectable 25 Gram(s) IV Push once  dextrose 50% Injectable 12.5 Gram(s) IV Push once  dextrose 50% Injectable 25 Gram(s) IV Push once  diltiazem    Tablet 30 milliGRAM(s) Oral two times a day  folic acid 1 milliGRAM(s) Oral daily  gabapentin 300 milliGRAM(s) Oral three times a day  glucagon  Injectable 1 milliGRAM(s) IntraMuscular once  guaiFENesin  milliGRAM(s) Oral every 12 hours  insulin lispro (ADMELOG) corrective regimen sliding scale   SubCutaneous three times a day before meals  insulin lispro (ADMELOG) corrective regimen sliding scale   SubCutaneous at bedtime  montelukast 10 milliGRAM(s) Oral daily  pantoprazole    Tablet 40 milliGRAM(s) Oral before breakfast  predniSONE   Tablet 40 milliGRAM(s) Oral daily  simvastatin 10 milliGRAM(s) Oral at bedtime  tiotropium 18 MICROgram(s) Capsule 1 Capsule(s) Inhalation daily  traMADol 50 milliGRAM(s) Oral two times a day    MEDICATIONS  (PRN):  acetaminophen     Tablet .. 650 milliGRAM(s) Oral every 6 hours PRN Temp greater or equal to 38C (100.4F), Mild Pain (1 - 3)  dextrose Oral Gel 15 Gram(s) Oral once PRN Blood Glucose LESS THAN 70 milliGRAM(s)/deciliter  dicyclomine 10 milliGRAM(s) Oral two times a day before meals PRN abd pain  guaiFENesin Oral Liquid (Sugar-Free) 200 milliGRAM(s) Oral every 6 hours PRN Cough      Vital Signs Last 24 Hrs  T(C): 36.6 (26 Apr 2022 12:45), Max: 36.6 (25 Apr 2022 20:44)  T(F): 97.8 (26 Apr 2022 12:45), Max: 97.9 (25 Apr 2022 20:44)  HR: 84 (26 Apr 2022 13:30) (60 - 84)  BP: 145/76 (26 Apr 2022 12:45) (123/72 - 171/79)  BP(mean): --  RR: 18 (26 Apr 2022 12:45) (18 - 18)  SpO2: 91% (26 Apr 2022 12:45) (91% - 100%)    PHYSICAL EXAM  General: well developed  well nourished, in no acute distress  Head: atraumatic, normocephalic  ENT: sclera anicteric  Neck: supple, trachea midline  CV: S1 S2, regular rate and rhythm  Lungs: clear to auscultation, no wheezes/rhonchi  Abdomen: soft, nontender, bowel sounds present, no palpable masses, pouchy  Extrem: no clubbing/cyanosis/edema  Skin: no significant increased ecchymosis/petechiae  Neuro: alert and oriented X3,  no focal deficits      LABS:             7.8    5.39  )-----------( 175      ( 04-26 @ 07:57 )             24.8                8.6    2.72  )-----------( 213      ( 04-25 @ 09:14 )             26.7                8.3    x     )-----------( x        ( 04-24 @ 16:46 )             26.3                8.1    1.58  )-----------( 191      ( 04-24 @ 08:35 )             25.6       04-26    137  |  105  |  30<H>  ----------------------------<  109<H>  4.7   |  29  |  1.20    Ca    9.0      26 Apr 2022 07:57  Phos  2.5     04-25  Mg     2.4     04-25    TPro  6.3  /  Alb  3.0<L>  /  TBili  0.4  /  DBili  x   /  AST  5<L>  /  ALT  16  /  AlkPhos  34<L>  04-26 04-26 @ 07:57  PT27.0 INR2.29  PTT--  04-25 @ 09:14  PT36.2 INR3.06  PTT--  04-24 @ 08:35  PT43.4 INR3.66  PTT--  04-23 @ 09:25  PT41.1 INR3.47  PTT--  04-22 @ 11:11  PT33.0 INR2.79  PTT--      RADIOLOGY & ADDITIONAL STUDIES:    IMPRESSION/RECOMMENDATIONS:

## 2022-04-26 NOTE — PROGRESS NOTE ADULT - SUBJECTIVE AND OBJECTIVE BOX
Patient is a 81y old  Female who presents with a chief complaint of superimposed bacterial PNA and COPD exacerbation (26 Apr 2022 14:09)      INTERVAL HPI/OVERNIGHT EVENTS: Pt seen and examined bedside, has no complaints. Pt is off O2 NC. RA at 94%. does not want to go home today. wants to see Dr Gallegos.    MEDICATIONS  (STANDING):  albuterol/ipratropium for Nebulization 3 milliLiter(s) Nebulizer every 6 hours  aMIOdarone    Tablet 100 milliGRAM(s) Oral daily  budesonide  80 MICROgram(s)/formoterol 4.5 MICROgram(s) Inhaler 2 Puff(s) Inhalation two times a day  dextrose 5%. 1000 milliLiter(s) (100 mL/Hr) IV Continuous <Continuous>  dextrose 5%. 1000 milliLiter(s) (50 mL/Hr) IV Continuous <Continuous>  dextrose 50% Injectable 25 Gram(s) IV Push once  dextrose 50% Injectable 12.5 Gram(s) IV Push once  dextrose 50% Injectable 25 Gram(s) IV Push once  diltiazem    Tablet 30 milliGRAM(s) Oral two times a day  folic acid 1 milliGRAM(s) Oral daily  gabapentin 300 milliGRAM(s) Oral three times a day  glucagon  Injectable 1 milliGRAM(s) IntraMuscular once  guaiFENesin  milliGRAM(s) Oral every 12 hours  insulin lispro (ADMELOG) corrective regimen sliding scale   SubCutaneous three times a day before meals  insulin lispro (ADMELOG) corrective regimen sliding scale   SubCutaneous at bedtime  montelukast 10 milliGRAM(s) Oral daily  pantoprazole    Tablet 40 milliGRAM(s) Oral before breakfast  predniSONE   Tablet 40 milliGRAM(s) Oral daily  simvastatin 10 milliGRAM(s) Oral at bedtime  tiotropium 18 MICROgram(s) Capsule 1 Capsule(s) Inhalation daily  traMADol 50 milliGRAM(s) Oral two times a day    MEDICATIONS  (PRN):  acetaminophen     Tablet .. 650 milliGRAM(s) Oral every 6 hours PRN Temp greater or equal to 38C (100.4F), Mild Pain (1 - 3)  dextrose Oral Gel 15 Gram(s) Oral once PRN Blood Glucose LESS THAN 70 milliGRAM(s)/deciliter  dicyclomine 10 milliGRAM(s) Oral two times a day before meals PRN abd pain  guaiFENesin Oral Liquid (Sugar-Free) 200 milliGRAM(s) Oral every 6 hours PRN Cough      Allergies    No Known Allergies    Intolerances        REVIEW OF SYSTEMS:  CONSTITUTIONAL: No fever or chills  HEENT:  No headache, no sore throat  RESPIRATORY: No cough, wheezing, or shortness of breath  CARDIOVASCULAR: No chest pain, palpitations, or leg swelling  GASTROINTESTINAL: No abd pain, nausea, vomiting, or diarrhea  GENITOURINARY: No dysuria, frequency, or hematuria  NEUROLOGICAL: no focal weakness or dizziness  MUSCULOSKELETAL: no myalgias     Vital Signs Last 24 Hrs  T(C): 36.6 (26 Apr 2022 12:45), Max: 36.6 (25 Apr 2022 20:44)  T(F): 97.8 (26 Apr 2022 12:45), Max: 97.9 (25 Apr 2022 20:44)  HR: 84 (26 Apr 2022 13:30) (60 - 84)  BP: 145/76 (26 Apr 2022 12:45) (123/72 - 171/79)  BP(mean): --  RR: 18 (26 Apr 2022 12:45) (18 - 18)  SpO2: 91% (26 Apr 2022 12:45) (91% - 100%)    PHYSICAL EXAM:  GENERAL: NAD  HEENT:  EOMI, moist mucous membranes  CHEST/LUNG:  CTA b/l, no rales, wheezes, or rhonchi  HEART:  RRR, S1, S2  ABDOMEN:  BS+, soft, nontender, nondistended  EXTREMITIES: no edema, cyanosis, or calf tenderness  NERVOUS SYSTEM: AA&Ox3    LABS:                        7.8    5.39  )-----------( 175      ( 26 Apr 2022 07:57 )             24.8     CBC Full  -  ( 26 Apr 2022 07:57 )  WBC Count : 5.39 K/uL  Hemoglobin : 7.8 g/dL  Hematocrit : 24.8 %  Platelet Count - Automated : 175 K/uL  Mean Cell Volume : 101.6 fl  Mean Cell Hemoglobin : 32.0 pg  Mean Cell Hemoglobin Concentration : 31.5 gm/dL  Auto Neutrophil # : x  Auto Lymphocyte # : x  Auto Monocyte # : x  Auto Eosinophil # : x  Auto Basophil # : x  Auto Neutrophil % : x  Auto Lymphocyte % : x  Auto Monocyte % : x  Auto Eosinophil % : x  Auto Basophil % : x    26 Apr 2022 07:57    137    |  105    |  30     ----------------------------<  109    4.7     |  29     |  1.20     Ca    9.0        26 Apr 2022 07:57    TPro  6.3    /  Alb  3.0    /  TBili  0.4    /  DBili  x      /  AST  5      /  ALT  16     /  AlkPhos  34     26 Apr 2022 07:57    PT/INR - ( 26 Apr 2022 07:57 )   PT: 27.0 sec;   INR: 2.29 ratio             CAPILLARY BLOOD GLUCOSE      POCT Blood Glucose.: 169 mg/dL (26 Apr 2022 16:43)  POCT Blood Glucose.: 143 mg/dL (26 Apr 2022 12:01)  POCT Blood Glucose.: 105 mg/dL (26 Apr 2022 07:49)  POCT Blood Glucose.: 192 mg/dL (25 Apr 2022 21:06)        Culture - Blood (collected 04-20-22 @ 15:06)  Source: .Blood Blood-Peripheral  Final Report (04-25-22 @ 16:00):    No Growth Final    Culture - Blood (collected 04-20-22 @ 15:06)  Source: .Blood Blood-Peripheral  Final Report (04-25-22 @ 16:00):    No Growth Final        RADIOLOGY & ADDITIONAL TESTS:    Personally reviewed.     Consultant(s) Notes Reviewed:  [x] YES  [ ] NO    Care Discussed with [x] Consultants  [x] Patient  [ ] Family  [ ]      [ ] Other; RN  DVT ppx

## 2022-04-26 NOTE — PROGRESS NOTE ADULT - SUBJECTIVE AND OBJECTIVE BOX
Patient is a 81y old  Female who presents with a chief complaint of superimposed bacterial PNA and COPD exacerbation (26 Apr 2022 16:49)      INTERVAL HPI/OVERNIGHT EVENTS:    continues to have cough and wheezing    MEDICATIONS  (STANDING):  albuterol/ipratropium for Nebulization 3 milliLiter(s) Nebulizer every 6 hours  aMIOdarone    Tablet 100 milliGRAM(s) Oral daily  budesonide  80 MICROgram(s)/formoterol 4.5 MICROgram(s) Inhaler 2 Puff(s) Inhalation two times a day  dextrose 5%. 1000 milliLiter(s) (100 mL/Hr) IV Continuous <Continuous>  dextrose 5%. 1000 milliLiter(s) (50 mL/Hr) IV Continuous <Continuous>  dextrose 50% Injectable 25 Gram(s) IV Push once  dextrose 50% Injectable 12.5 Gram(s) IV Push once  dextrose 50% Injectable 25 Gram(s) IV Push once  diltiazem    Tablet 30 milliGRAM(s) Oral two times a day  folic acid 1 milliGRAM(s) Oral daily  gabapentin 300 milliGRAM(s) Oral three times a day  glucagon  Injectable 1 milliGRAM(s) IntraMuscular once  guaiFENesin  milliGRAM(s) Oral every 12 hours  insulin lispro (ADMELOG) corrective regimen sliding scale   SubCutaneous three times a day before meals  insulin lispro (ADMELOG) corrective regimen sliding scale   SubCutaneous at bedtime  montelukast 10 milliGRAM(s) Oral daily  pantoprazole    Tablet 40 milliGRAM(s) Oral before breakfast  predniSONE   Tablet 40 milliGRAM(s) Oral daily  simvastatin 10 milliGRAM(s) Oral at bedtime  tiotropium 18 MICROgram(s) Capsule 1 Capsule(s) Inhalation daily  traMADol 50 milliGRAM(s) Oral two times a day      MEDICATIONS  (PRN):  acetaminophen     Tablet .. 650 milliGRAM(s) Oral every 6 hours PRN Temp greater or equal to 38C (100.4F), Mild Pain (1 - 3)  dextrose Oral Gel 15 Gram(s) Oral once PRN Blood Glucose LESS THAN 70 milliGRAM(s)/deciliter  dicyclomine 10 milliGRAM(s) Oral two times a day before meals PRN abd pain  guaiFENesin Oral Liquid (Sugar-Free) 200 milliGRAM(s) Oral every 6 hours PRN Cough      Allergies    No Known Allergies    Intolerances        PAST MEDICAL & SURGICAL HISTORY:  COPD (chronic obstructive pulmonary disease)    DM (diabetes mellitus)    PAF (paroxysmal atrial fibrillation)  s/p ablation    Hiatal hernia    GIB (gastrointestinal bleeding)    Pericarditis    Shoulder fracture, right    Hematoma  Hematoma of Right wrist    S/P hysterectomy    S/P foot surgery    S/P knee surgery    After cataract  bilateral eye cataract surgically removed    Closed right hip fracture  rigth hip ORIF july 19 2016    S/P IVC filter  nov 2016    S/P carpal tunnel release  Right 2008 &amp; 6/27/17        Vital Signs Last 24 Hrs  T(C): 36.6 (26 Apr 2022 12:45), Max: 36.6 (25 Apr 2022 20:44)  T(F): 97.8 (26 Apr 2022 12:45), Max: 97.9 (25 Apr 2022 20:44)  HR: 70 (26 Apr 2022 20:25) (60 - 84)  BP: 136/76 (26 Apr 2022 16:55) (123/72 - 150/68)  BP(mean): --  RR: 18 (26 Apr 2022 12:45) (18 - 18)  SpO2: 95% (26 Apr 2022 20:25) (91% - 100%)    PHYSICAL EXAMINATION:    GENERAL: The patient is awake and alert in no apparent distress.     HEENT: Head is normocephalic and atraumatic. Extraocular muscles are intact. Mucous membranes are moist.    NECK: no JVD    LUNGS: bilateral mild expiratory wheezes    HEART: Regular rate and rhythm without murmur.    ABDOMEN: Soft, nontender, and nondistended.      EXTREMITIES: Without any cyanosis, clubbing, rash, lesions or edema.    NEUROLOGIC: Grossly intact.    SKIN: No ulceration or induration present.      LABS:                        7.8    5.39  )-----------( 175      ( 26 Apr 2022 07:57 )             24.8     04-26    137  |  105  |  30<H>  ----------------------------<  109<H>  4.7   |  29  |  1.20    Ca    9.0      26 Apr 2022 07:57  Phos  2.5     04-25  Mg     2.4     04-25    TPro  6.3  /  Alb  3.0<L>  /  TBili  0.4  /  DBili  x   /  AST  5<L>  /  ALT  16  /  AlkPhos  34<L>  04-26    PT/INR - ( 26 Apr 2022 07:57 )   PT: 27.0 sec;   INR: 2.29 ratio             Assessment:    Acute Hypoxic respiratory failure  Resolving Parainfluenza Bronchitis  COPD with exacerbation  Anemia  Hx Pulmonary Emboli - S/P IVC filter  Atrial Fibrillation    Plan:    Continue Prednisone with tapering schedule  Supplemental oxygen  Duoneb QID  Symbicort + Spiriva  Follow pulse oximetry  Physical therapy evaluation for ambulation

## 2022-04-27 LAB
BASOPHILS # BLD AUTO: 0 K/UL — SIGNIFICANT CHANGE UP (ref 0–0.2)
BASOPHILS NFR BLD AUTO: 0 % — SIGNIFICANT CHANGE UP (ref 0–2)
EOSINOPHIL # BLD AUTO: 0 K/UL — SIGNIFICANT CHANGE UP (ref 0–0.5)
EOSINOPHIL NFR BLD AUTO: 0 % — SIGNIFICANT CHANGE UP (ref 0–6)
HCT VFR BLD CALC: 25.4 % — LOW (ref 34.5–45)
HGB BLD-MCNC: 8.1 G/DL — LOW (ref 11.5–15.5)
INR BLD: 1.43 RATIO — HIGH (ref 0.88–1.16)
LYMPHOCYTES # BLD AUTO: 0.76 K/UL — LOW (ref 1–3.3)
LYMPHOCYTES # BLD AUTO: 9 % — LOW (ref 13–44)
MANUAL SMEAR VERIFICATION: SIGNIFICANT CHANGE UP
MCHC RBC-ENTMCNC: 31.9 GM/DL — LOW (ref 32–36)
MCHC RBC-ENTMCNC: 32.3 PG — SIGNIFICANT CHANGE UP (ref 27–34)
MCV RBC AUTO: 101.2 FL — HIGH (ref 80–100)
METAMYELOCYTES # FLD: 3 % — HIGH (ref 0–0)
MONOCYTES # BLD AUTO: 0.51 K/UL — SIGNIFICANT CHANGE UP (ref 0–0.9)
MONOCYTES NFR BLD AUTO: 6 % — SIGNIFICANT CHANGE UP (ref 2–14)
MYELOCYTES NFR BLD: 1 % — HIGH (ref 0–0)
NEUTROPHILS # BLD AUTO: 6.85 K/UL — SIGNIFICANT CHANGE UP (ref 1.8–7.4)
NEUTROPHILS NFR BLD AUTO: 75 % — SIGNIFICANT CHANGE UP (ref 43–77)
NEUTS BAND # BLD: 6 % — SIGNIFICANT CHANGE UP (ref 0–8)
NRBC # BLD: 1 — SIGNIFICANT CHANGE UP
NRBC # BLD: SIGNIFICANT CHANGE UP /100 WBCS (ref 0–0)
PLAT MORPH BLD: NORMAL — SIGNIFICANT CHANGE UP
PLATELET # BLD AUTO: 175 K/UL — SIGNIFICANT CHANGE UP (ref 150–400)
PROTHROM AB SERPL-ACNC: 16.8 SEC — HIGH (ref 10.5–13.4)
RBC # BLD: 2.51 M/UL — LOW (ref 3.8–5.2)
RBC # FLD: 20.4 % — HIGH (ref 10.3–14.5)
RBC BLD AUTO: SIGNIFICANT CHANGE UP
SARS-COV-2 RNA SPEC QL NAA+PROBE: SIGNIFICANT CHANGE UP
WBC # BLD: 8.46 K/UL — SIGNIFICANT CHANGE UP (ref 3.8–10.5)
WBC # FLD AUTO: 8.46 K/UL — SIGNIFICANT CHANGE UP (ref 3.8–10.5)

## 2022-04-27 RX ORDER — TRAMADOL HYDROCHLORIDE 50 MG/1
50 TABLET ORAL
Refills: 0 | Status: DISCONTINUED | OUTPATIENT
Start: 2022-04-28 | End: 2022-04-28

## 2022-04-27 RX ORDER — FUROSEMIDE 40 MG
20 TABLET ORAL ONCE
Refills: 0 | Status: COMPLETED | OUTPATIENT
Start: 2022-04-27 | End: 2022-04-27

## 2022-04-27 RX ORDER — WARFARIN SODIUM 2.5 MG/1
2 TABLET ORAL ONCE
Refills: 0 | Status: COMPLETED | OUTPATIENT
Start: 2022-04-27 | End: 2022-04-27

## 2022-04-27 RX ORDER — DIPHENHYDRAMINE HCL 50 MG
25 CAPSULE ORAL ONCE
Refills: 0 | Status: COMPLETED | OUTPATIENT
Start: 2022-04-27 | End: 2022-04-27

## 2022-04-27 RX ORDER — ACETAMINOPHEN 500 MG
650 TABLET ORAL ONCE
Refills: 0 | Status: COMPLETED | OUTPATIENT
Start: 2022-04-27 | End: 2022-04-27

## 2022-04-27 RX ADMIN — BUDESONIDE AND FORMOTEROL FUMARATE DIHYDRATE 2 PUFF(S): 160; 4.5 AEROSOL RESPIRATORY (INHALATION) at 05:33

## 2022-04-27 RX ADMIN — Medication 3 MILLILITER(S): at 19:45

## 2022-04-27 RX ADMIN — TRAMADOL HYDROCHLORIDE 50 MILLIGRAM(S): 50 TABLET ORAL at 17:33

## 2022-04-27 RX ADMIN — GABAPENTIN 300 MILLIGRAM(S): 400 CAPSULE ORAL at 13:26

## 2022-04-27 RX ADMIN — SIMVASTATIN 10 MILLIGRAM(S): 20 TABLET, FILM COATED ORAL at 21:39

## 2022-04-27 RX ADMIN — Medication 1 MILLIGRAM(S): at 11:51

## 2022-04-27 RX ADMIN — Medication 3 MILLILITER(S): at 14:08

## 2022-04-27 RX ADMIN — Medication 650 MILLIGRAM(S): at 13:25

## 2022-04-27 RX ADMIN — Medication 40 MILLIGRAM(S): at 05:32

## 2022-04-27 RX ADMIN — MONTELUKAST 10 MILLIGRAM(S): 4 TABLET, CHEWABLE ORAL at 11:51

## 2022-04-27 RX ADMIN — GABAPENTIN 300 MILLIGRAM(S): 400 CAPSULE ORAL at 21:39

## 2022-04-27 RX ADMIN — GABAPENTIN 300 MILLIGRAM(S): 400 CAPSULE ORAL at 05:32

## 2022-04-27 RX ADMIN — Medication 600 MILLIGRAM(S): at 17:31

## 2022-04-27 RX ADMIN — Medication 600 MILLIGRAM(S): at 05:33

## 2022-04-27 RX ADMIN — Medication 650 MILLIGRAM(S): at 13:55

## 2022-04-27 RX ADMIN — Medication 25 MILLIGRAM(S): at 13:25

## 2022-04-27 RX ADMIN — TRAMADOL HYDROCHLORIDE 50 MILLIGRAM(S): 50 TABLET ORAL at 18:03

## 2022-04-27 RX ADMIN — TRAMADOL HYDROCHLORIDE 50 MILLIGRAM(S): 50 TABLET ORAL at 06:21

## 2022-04-27 RX ADMIN — Medication 3 MILLILITER(S): at 07:36

## 2022-04-27 RX ADMIN — Medication 30 MILLIGRAM(S): at 05:33

## 2022-04-27 RX ADMIN — BUDESONIDE AND FORMOTEROL FUMARATE DIHYDRATE 2 PUFF(S): 160; 4.5 AEROSOL RESPIRATORY (INHALATION) at 21:39

## 2022-04-27 RX ADMIN — TRAMADOL HYDROCHLORIDE 50 MILLIGRAM(S): 50 TABLET ORAL at 05:32

## 2022-04-27 RX ADMIN — Medication 3 MILLILITER(S): at 00:12

## 2022-04-27 RX ADMIN — PANTOPRAZOLE SODIUM 40 MILLIGRAM(S): 20 TABLET, DELAYED RELEASE ORAL at 05:37

## 2022-04-27 RX ADMIN — Medication 20 MILLIGRAM(S): at 16:12

## 2022-04-27 RX ADMIN — TIOTROPIUM BROMIDE 1 CAPSULE(S): 18 CAPSULE ORAL; RESPIRATORY (INHALATION) at 05:33

## 2022-04-27 RX ADMIN — WARFARIN SODIUM 2 MILLIGRAM(S): 2.5 TABLET ORAL at 21:39

## 2022-04-27 RX ADMIN — Medication 30 MILLIGRAM(S): at 17:31

## 2022-04-27 RX ADMIN — AMIODARONE HYDROCHLORIDE 100 MILLIGRAM(S): 400 TABLET ORAL at 05:32

## 2022-04-27 NOTE — PROGRESS NOTE ADULT - SUBJECTIVE AND OBJECTIVE BOX
Patient is a 81y old  Female who presents with a chief complaint of superimposed bacterial PNA and COPD exacerbation (27 Apr 2022 21:40)      INTERVAL HPI/OVERNIGHT EVENTS:    Feels better today. Shortness of breath and wheezing have improved    MEDICATIONS  (STANDING):  albuterol/ipratropium for Nebulization 3 milliLiter(s) Nebulizer every 6 hours  aMIOdarone    Tablet 100 milliGRAM(s) Oral daily  budesonide  80 MICROgram(s)/formoterol 4.5 MICROgram(s) Inhaler 2 Puff(s) Inhalation two times a day  dextrose 5%. 1000 milliLiter(s) (50 mL/Hr) IV Continuous <Continuous>  dextrose 5%. 1000 milliLiter(s) (100 mL/Hr) IV Continuous <Continuous>  dextrose 50% Injectable 25 Gram(s) IV Push once  dextrose 50% Injectable 12.5 Gram(s) IV Push once  dextrose 50% Injectable 25 Gram(s) IV Push once  diltiazem    Tablet 30 milliGRAM(s) Oral two times a day  folic acid 1 milliGRAM(s) Oral daily  gabapentin 300 milliGRAM(s) Oral three times a day  glucagon  Injectable 1 milliGRAM(s) IntraMuscular once  guaiFENesin  milliGRAM(s) Oral every 12 hours  insulin lispro (ADMELOG) corrective regimen sliding scale   SubCutaneous three times a day before meals  insulin lispro (ADMELOG) corrective regimen sliding scale   SubCutaneous at bedtime  montelukast 10 milliGRAM(s) Oral daily  pantoprazole    Tablet 40 milliGRAM(s) Oral before breakfast  predniSONE   Tablet 40 milliGRAM(s) Oral daily  simvastatin 10 milliGRAM(s) Oral at bedtime  tiotropium 18 MICROgram(s) Capsule 1 Capsule(s) Inhalation daily      MEDICATIONS  (PRN):  acetaminophen     Tablet .. 650 milliGRAM(s) Oral every 6 hours PRN Temp greater or equal to 38C (100.4F), Mild Pain (1 - 3)  dextrose Oral Gel 15 Gram(s) Oral once PRN Blood Glucose LESS THAN 70 milliGRAM(s)/deciliter  dicyclomine 10 milliGRAM(s) Oral two times a day before meals PRN abd pain  guaiFENesin Oral Liquid (Sugar-Free) 200 milliGRAM(s) Oral every 6 hours PRN Cough      Allergies    No Known Allergies    Intolerances        PAST MEDICAL & SURGICAL HISTORY:  COPD (chronic obstructive pulmonary disease)    DM (diabetes mellitus)    PAF (paroxysmal atrial fibrillation)  s/p ablation    Hiatal hernia    GIB (gastrointestinal bleeding)    Pericarditis    Shoulder fracture, right    Hematoma  Hematoma of Right wrist    S/P hysterectomy    S/P foot surgery    S/P knee surgery    After cataract  bilateral eye cataract surgically removed    Closed right hip fracture  rigth hip ORIF july 19 2016    S/P IVC filter  nov 2016    S/P carpal tunnel release  Right 2008 &amp; 6/27/17        Vital Signs Last 24 Hrs  T(C): 36.8 (27 Apr 2022 20:07), Max: 37.2 (27 Apr 2022 14:00)  T(F): 98.2 (27 Apr 2022 20:07), Max: 98.9 (27 Apr 2022 14:00)  HR: 64 (27 Apr 2022 20:07) (64 - 82)  BP: 156/79 (27 Apr 2022 20:07) (137/71 - 163/80)  BP(mean): --  RR: 19 (27 Apr 2022 20:07) (18 - 19)  SpO2: 93% (27 Apr 2022 20:07) (91% - 94%)    PHYSICAL EXAMINATION:    GENERAL: The patient is awake and alert in no apparent distress.     HEENT: Head is normocephalic and atraumatic    NECK: no JVD    LUNGS: Few scattered rhonchi    HEART: Irregular rate and rhythm without murmur.    ABDOMEN: Soft, nontender, and nondistended.      EXTREMITIES: Without any cyanosis, clubbing, rash, lesions or edema.    NEUROLOGIC: Grossly intact.    SKIN: No ulceration or induration present.      LABS:                        8.1    8.46  )-----------( 175      ( 27 Apr 2022 08:55 )             25.4     04-26    137  |  105  |  30<H>  ----------------------------<  109<H>  4.7   |  29  |  1.20    Ca    9.0      26 Apr 2022 07:57    TPro  6.3  /  Alb  3.0<L>  /  TBili  0.4  /  DBili  x   /  AST  5<L>  /  ALT  16  /  AlkPhos  34<L>  04-26    PT/INR - ( 27 Apr 2022 19:52 )   PT: 16.8 sec;   INR: 1.43 ratio                   Assessment:    Resolving Parainfluenza Bronchitis  COPD with exacerbation  S/P Pancytopenia  Hx Pulmonary emboli - S/P IVC Filter  Atrial Fibrillation    Plan:    For discharge in AM  Recommend tapering Prednisone from 40 mg daily over next 1 week  Patient will take Trelegy inhaler 1 puff daily at home  She will use Albuterol as needed for symptoms  Office follow-up in 2 weeks

## 2022-04-27 NOTE — DIETITIAN INITIAL EVALUATION ADULT - ADD RECOMMEND
1) Continue Consistent carbohydrate, DASH/TLC diet   2) Monitor PO intake, diet tolerance, weight trends, labs, GI function, and skin integrity

## 2022-04-27 NOTE — DIETITIAN INITIAL EVALUATION ADULT - REASON FOR ADMISSION
82yo Female with PMH of DM, COPD, A-fib, CKD. Pt admitted on 4/20 in setting of viral infection.

## 2022-04-27 NOTE — PROGRESS NOTE ADULT - SUBJECTIVE AND OBJECTIVE BOX
===[INTERVAL HX: ]===  Patient seen and examined;  Chart reviewed and events noted;     Hgb slight decline today.   Pt dane slight sx.     s/p PRBC transfusion  states feels marginally better post transfusion.     Planned DC tomorrow if better.   still with some wheezing.       ===[HEALTH ISSUES]===      HEALTH ISSUES - PROBLEM Dx:  Pneumonia    Anemia    COPD exacerbation    Chronic kidney disease, unspecified CKD stage    A-fib    Pancytopenia    Need for prophylactic measure    Type 2 diabetes mellitus    Heart failure                ===[MEDICATIONS]===  MEDICATIONS  (STANDING):  albuterol/ipratropium for Nebulization 3 milliLiter(s) Nebulizer every 6 hours  aMIOdarone    Tablet 100 milliGRAM(s) Oral daily  budesonide  80 MICROgram(s)/formoterol 4.5 MICROgram(s) Inhaler 2 Puff(s) Inhalation two times a day  dextrose 5%. 1000 milliLiter(s) (50 mL/Hr) IV Continuous <Continuous>  dextrose 5%. 1000 milliLiter(s) (100 mL/Hr) IV Continuous <Continuous>  dextrose 50% Injectable 25 Gram(s) IV Push once  dextrose 50% Injectable 12.5 Gram(s) IV Push once  dextrose 50% Injectable 25 Gram(s) IV Push once  diltiazem    Tablet 30 milliGRAM(s) Oral two times a day  folic acid 1 milliGRAM(s) Oral daily  gabapentin 300 milliGRAM(s) Oral three times a day  glucagon  Injectable 1 milliGRAM(s) IntraMuscular once  guaiFENesin  milliGRAM(s) Oral every 12 hours  insulin lispro (ADMELOG) corrective regimen sliding scale   SubCutaneous three times a day before meals  insulin lispro (ADMELOG) corrective regimen sliding scale   SubCutaneous at bedtime  montelukast 10 milliGRAM(s) Oral daily  pantoprazole    Tablet 40 milliGRAM(s) Oral before breakfast  predniSONE   Tablet 40 milliGRAM(s) Oral daily  simvastatin 10 milliGRAM(s) Oral at bedtime  tiotropium 18 MICROgram(s) Capsule 1 Capsule(s) Inhalation daily  warfarin 2 milliGRAM(s) Oral once    MEDICATIONS  (PRN):  acetaminophen     Tablet .. 650 milliGRAM(s) Oral every 6 hours PRN Temp greater or equal to 38C (100.4F), Mild Pain (1 - 3)  dextrose Oral Gel 15 Gram(s) Oral once PRN Blood Glucose LESS THAN 70 milliGRAM(s)/deciliter  dicyclomine 10 milliGRAM(s) Oral two times a day before meals PRN abd pain  guaiFENesin Oral Liquid (Sugar-Free) 200 milliGRAM(s) Oral every 6 hours PRN Cough      ===[VITALS]===  Vital Signs Last 24 Hrs  T(C): 36.8 (2022 20:07), Max: 37.2 (2022 14:00)  T(F): 98.2 (2022 20:07), Max: 98.9 (2022 14:00)  HR: 64 (2022 20:07) (64 - 82)  BP: 156/79 (2022 20:07) (137/71 - 163/80)  BP(mean): --  RR: 19 (2022 20:07) (18 - 19)  SpO2: 93% (2022 20:07) (91% - 94%)  [WT/HT]  Daily     Daily Weight in k (2022 05:22)  [VENT]    ===[PHYSICAL EXAM]===  General: WN,  WD adult in NAD.  HEENT:  anicteric sclera, pink conjunctivae,   Neck: supple, no masses.  CV: normal S1S2, no murmur, no rubs, no gallops.  Lungs: clear to auscultation, faint wheezes, no rales, no rhonchi.  Abdomen: soft, non-tender, non-distended, no hepatosplenomegaly, normal BS, no guarding.  Ext: no clubbing, no cyanosis, no edema.  Skin: no rashes,  no petechiae, no venous stasis changes.  Neuro: alert and oriented X 3, no focal motor deficits.  LN: no SC LILLIAN.        ===[LABS:]===                        8.1    8.46  )-----------( 175      ( 2022 08:55 )             25.4         137  |  105  |  30<H>  ----------------------------<  109<H>  4.7   |  29  |  1.20    Ca    9.0      2022 07:57    TPro  6.3  /  Alb  3.0<L>  /  TBili  0.4  /  DBili  x   /  AST  5<L>  /  ALT  16  /  AlkPhos  34<L>      PT/INR - ( 2022 19:52 )   PT: 16.8 sec;   INR: 1.43 ratio               Iron - Total Binding Capacity.: 230 ug/dL (22 @ 14:05)  Ferritin, Serum: 1349 ng/mL (22 @ 14:05)        COVID-19 PCR: NotDetec (2022 07:32)  SARS-CoV-2: NotDetec (2022 12:17)              ===[RADIOLOGY STUDIES:]===

## 2022-04-27 NOTE — DIETITIAN INITIAL EVALUATION ADULT - PERTINENT LABORATORY DATA
04-26    137  |  105  |  30<H>  ----------------------------<  109<H>  4.7   |  29  |  1.20    Ca    9.0      26 Apr 2022 07:57    TPro  6.3  /  Alb  3.0<L>  /  TBili  0.4  /  DBili  x   /  AST  5<L>  /  ALT  16  /  AlkPhos  34<L>  04-26  POCT Blood Glucose.: 133 mg/dL (04-27-22 @ 11:51)  A1C with Estimated Average Glucose Result: 5.5 % (04-21-22 @ 11:18)

## 2022-04-27 NOTE — PROGRESS NOTE ADULT - SUBJECTIVE AND OBJECTIVE BOX
SUBJECTIVE:    No acute events overnight, afebrile, hds.  Pt is feeling weak and sob, and would like a blood transfusion.    VITAL SIGNS:    Vital Signs Last 24 Hrs  T(C): 37.2 (27 Apr 2022 14:00), Max: 37.2 (27 Apr 2022 14:00)  T(F): 98.9 (27 Apr 2022 14:00), Max: 98.9 (27 Apr 2022 14:00)  HR: 71 (27 Apr 2022 14:15) (66 - 79)  BP: 163/80 (27 Apr 2022 14:00) (136/76 - 163/80)  BP(mean): --  RR: 18 (27 Apr 2022 14:00) (18 - 19)  SpO2: 91% (27 Apr 2022 14:15) (91% - 95%)    PHYSICAL EXAM:     GENERAL: no acute distress  HEENT: EOMI, neck supple, MMM  RESPIRATORY: mild coarse bs, no wheezing  CARDIOVASCULAR: RRR, no murmurs, gallops, rubs  ABDOMINAL: soft, non-tender, non-distended, positive bowel sounds   EXTREMITIES: no clubbing, cyanosis, or edema  NEUROLOGICAL: alert and oriented x 3, non-focal  SKIN: no new rashes or lesions   MUSCULOSKELETAL: no gross joint deformity                          8.1    8.46  )-----------( 175      ( 27 Apr 2022 08:55 )             25.4     04-26    137  |  105  |  30<H>  ----------------------------<  109<H>  4.7   |  29  |  1.20    Ca    9.0      26 Apr 2022 07:57    TPro  6.3  /  Alb  3.0<L>  /  TBili  0.4  /  DBili  x   /  AST  5<L>  /  ALT  16  /  AlkPhos  34<L>  04-26      CAPILLARY BLOOD GLUCOSE      POCT Blood Glucose.: 133 mg/dL (27 Apr 2022 11:51)  POCT Blood Glucose.: 105 mg/dL (27 Apr 2022 08:00)  POCT Blood Glucose.: 136 mg/dL (26 Apr 2022 21:42)  POCT Blood Glucose.: 169 mg/dL (26 Apr 2022 16:43)      MEDICATIONS  (STANDING):  albuterol/ipratropium for Nebulization 3 milliLiter(s) Nebulizer every 6 hours  aMIOdarone    Tablet 100 milliGRAM(s) Oral daily  budesonide  80 MICROgram(s)/formoterol 4.5 MICROgram(s) Inhaler 2 Puff(s) Inhalation two times a day  dextrose 5%. 1000 milliLiter(s) (50 mL/Hr) IV Continuous <Continuous>  dextrose 5%. 1000 milliLiter(s) (100 mL/Hr) IV Continuous <Continuous>  dextrose 50% Injectable 12.5 Gram(s) IV Push once  dextrose 50% Injectable 25 Gram(s) IV Push once  dextrose 50% Injectable 25 Gram(s) IV Push once  diltiazem    Tablet 30 milliGRAM(s) Oral two times a day  folic acid 1 milliGRAM(s) Oral daily  gabapentin 300 milliGRAM(s) Oral three times a day  glucagon  Injectable 1 milliGRAM(s) IntraMuscular once  guaiFENesin  milliGRAM(s) Oral every 12 hours  insulin lispro (ADMELOG) corrective regimen sliding scale   SubCutaneous three times a day before meals  insulin lispro (ADMELOG) corrective regimen sliding scale   SubCutaneous at bedtime  montelukast 10 milliGRAM(s) Oral daily  pantoprazole    Tablet 40 milliGRAM(s) Oral before breakfast  predniSONE   Tablet 40 milliGRAM(s) Oral daily  simvastatin 10 milliGRAM(s) Oral at bedtime  tiotropium 18 MICROgram(s) Capsule 1 Capsule(s) Inhalation daily  traMADol 50 milliGRAM(s) Oral two times a day

## 2022-04-27 NOTE — DIETITIAN INITIAL EVALUATION ADULT - OTHER INFO
GI/Intake:   -Noted with % intake of meals   -RN endorses good appetite  -Last BM documented 4/22; no bowel regimen ordered     Endo:   -Hx of T2DM  -Latest A1c: 5.5% - controlled   -No DM medication noted PTA   -SSI ordered in house; Consistent carbohydrate diet   -Prednisone ordered, monitor BG levels closely     CV:   -Hx of HF   -DASH/TLC diet ordered     Pulm:   -Presenting with COPD exacerbation and PNA   -Currently tolerating RA

## 2022-04-27 NOTE — DIETITIAN INITIAL EVALUATION ADULT - NS FNS DIET ORDER
Diet, Regular:   Consistent Carbohydrate {Evening Snack}  DASH/TLC {Sodium & Cholesterol Restricted} (04-20-22 @ 21:50) [Active]

## 2022-04-28 ENCOUNTER — TRANSCRIPTION ENCOUNTER (OUTPATIENT)
Age: 82
End: 2022-04-28

## 2022-04-28 VITALS — OXYGEN SATURATION: 92 %

## 2022-04-28 LAB
ANION GAP SERPL CALC-SCNC: 7 MMOL/L — SIGNIFICANT CHANGE UP (ref 5–17)
BUN SERPL-MCNC: 33 MG/DL — HIGH (ref 7–23)
CALCIUM SERPL-MCNC: 9.6 MG/DL — SIGNIFICANT CHANGE UP (ref 8.5–10.1)
CHLORIDE SERPL-SCNC: 99 MMOL/L — SIGNIFICANT CHANGE UP (ref 96–108)
CO2 SERPL-SCNC: 30 MMOL/L — SIGNIFICANT CHANGE UP (ref 22–31)
CREAT SERPL-MCNC: 1.4 MG/DL — HIGH (ref 0.5–1.3)
EGFR: 38 ML/MIN/1.73M2 — LOW
GLUCOSE SERPL-MCNC: 92 MG/DL — SIGNIFICANT CHANGE UP (ref 70–99)
HCT VFR BLD CALC: 30.6 % — LOW (ref 34.5–45)
HGB BLD-MCNC: 10.2 G/DL — LOW (ref 11.5–15.5)
MCHC RBC-ENTMCNC: 32.5 PG — SIGNIFICANT CHANGE UP (ref 27–34)
MCHC RBC-ENTMCNC: 33.3 GM/DL — SIGNIFICANT CHANGE UP (ref 32–36)
MCV RBC AUTO: 97.5 FL — SIGNIFICANT CHANGE UP (ref 80–100)
NRBC # BLD: 0 /100 WBCS — SIGNIFICANT CHANGE UP (ref 0–0)
PLATELET # BLD AUTO: 156 K/UL — SIGNIFICANT CHANGE UP (ref 150–400)
POTASSIUM SERPL-MCNC: 4.1 MMOL/L — SIGNIFICANT CHANGE UP (ref 3.5–5.3)
POTASSIUM SERPL-SCNC: 4.1 MMOL/L — SIGNIFICANT CHANGE UP (ref 3.5–5.3)
RBC # BLD: 3.14 M/UL — LOW (ref 3.8–5.2)
RBC # FLD: 20.2 % — HIGH (ref 10.3–14.5)
SODIUM SERPL-SCNC: 136 MMOL/L — SIGNIFICANT CHANGE UP (ref 135–145)
WBC # BLD: 15.2 K/UL — HIGH (ref 3.8–10.5)
WBC # FLD AUTO: 15.2 K/UL — HIGH (ref 3.8–10.5)

## 2022-04-28 PROCEDURE — 87899 AGENT NOS ASSAY W/OPTIC: CPT

## 2022-04-28 PROCEDURE — 94760 N-INVAS EAR/PLS OXIMETRY 1: CPT

## 2022-04-28 PROCEDURE — 83735 ASSAY OF MAGNESIUM: CPT

## 2022-04-28 PROCEDURE — 85014 HEMATOCRIT: CPT

## 2022-04-28 PROCEDURE — 85730 THROMBOPLASTIN TIME PARTIAL: CPT

## 2022-04-28 PROCEDURE — 85610 PROTHROMBIN TIME: CPT

## 2022-04-28 PROCEDURE — 82962 GLUCOSE BLOOD TEST: CPT

## 2022-04-28 PROCEDURE — 86880 COOMBS TEST DIRECT: CPT

## 2022-04-28 PROCEDURE — P9016: CPT

## 2022-04-28 PROCEDURE — 81001 URINALYSIS AUTO W/SCOPE: CPT

## 2022-04-28 PROCEDURE — 86900 BLOOD TYPING SEROLOGIC ABO: CPT

## 2022-04-28 PROCEDURE — 87640 STAPH A DNA AMP PROBE: CPT

## 2022-04-28 PROCEDURE — 99285 EMERGENCY DEPT VISIT HI MDM: CPT

## 2022-04-28 PROCEDURE — 86850 RBC ANTIBODY SCREEN: CPT

## 2022-04-28 PROCEDURE — 83605 ASSAY OF LACTIC ACID: CPT

## 2022-04-28 PROCEDURE — 85018 HEMOGLOBIN: CPT

## 2022-04-28 PROCEDURE — 83550 IRON BINDING TEST: CPT

## 2022-04-28 PROCEDURE — 84145 PROCALCITONIN (PCT): CPT

## 2022-04-28 PROCEDURE — 87040 BLOOD CULTURE FOR BACTERIA: CPT

## 2022-04-28 PROCEDURE — U0005: CPT

## 2022-04-28 PROCEDURE — 85025 COMPLETE CBC W/AUTO DIFF WBC: CPT

## 2022-04-28 PROCEDURE — 83036 HEMOGLOBIN GLYCOSYLATED A1C: CPT

## 2022-04-28 PROCEDURE — 86923 COMPATIBILITY TEST ELECTRIC: CPT

## 2022-04-28 PROCEDURE — 84100 ASSAY OF PHOSPHORUS: CPT

## 2022-04-28 PROCEDURE — 87641 MR-STAPH DNA AMP PROBE: CPT

## 2022-04-28 PROCEDURE — 96365 THER/PROPH/DIAG IV INF INIT: CPT | Mod: XU

## 2022-04-28 PROCEDURE — 36430 TRANSFUSION BLD/BLD COMPNT: CPT

## 2022-04-28 PROCEDURE — 0225U NFCT DS DNA&RNA 21 SARSCOV2: CPT

## 2022-04-28 PROCEDURE — 85027 COMPLETE CBC AUTOMATED: CPT

## 2022-04-28 PROCEDURE — 87449 NOS EACH ORGANISM AG IA: CPT

## 2022-04-28 PROCEDURE — 86078 PHYS BLOOD BANK SERV REACTJ: CPT

## 2022-04-28 PROCEDURE — 80048 BASIC METABOLIC PNL TOTAL CA: CPT

## 2022-04-28 PROCEDURE — 82728 ASSAY OF FERRITIN: CPT

## 2022-04-28 PROCEDURE — 36415 COLL VENOUS BLD VENIPUNCTURE: CPT

## 2022-04-28 PROCEDURE — 83540 ASSAY OF IRON: CPT

## 2022-04-28 PROCEDURE — 94640 AIRWAY INHALATION TREATMENT: CPT

## 2022-04-28 PROCEDURE — U0003: CPT

## 2022-04-28 PROCEDURE — 93005 ELECTROCARDIOGRAM TRACING: CPT

## 2022-04-28 PROCEDURE — 80053 COMPREHEN METABOLIC PANEL: CPT

## 2022-04-28 PROCEDURE — 86901 BLOOD TYPING SEROLOGIC RH(D): CPT

## 2022-04-28 PROCEDURE — 71045 X-RAY EXAM CHEST 1 VIEW: CPT

## 2022-04-28 PROCEDURE — 99239 HOSP IP/OBS DSCHRG MGMT >30: CPT

## 2022-04-28 RX ADMIN — Medication 1 MILLIGRAM(S): at 11:09

## 2022-04-28 RX ADMIN — Medication 30 MILLIGRAM(S): at 06:23

## 2022-04-28 RX ADMIN — Medication 3 MILLILITER(S): at 13:44

## 2022-04-28 RX ADMIN — TIOTROPIUM BROMIDE 1 CAPSULE(S): 18 CAPSULE ORAL; RESPIRATORY (INHALATION) at 06:28

## 2022-04-28 RX ADMIN — MONTELUKAST 10 MILLIGRAM(S): 4 TABLET, CHEWABLE ORAL at 11:09

## 2022-04-28 RX ADMIN — TRAMADOL HYDROCHLORIDE 50 MILLIGRAM(S): 50 TABLET ORAL at 06:23

## 2022-04-28 RX ADMIN — Medication 3 MILLILITER(S): at 07:50

## 2022-04-28 RX ADMIN — Medication 40 MILLIGRAM(S): at 06:23

## 2022-04-28 RX ADMIN — TRAMADOL HYDROCHLORIDE 50 MILLIGRAM(S): 50 TABLET ORAL at 07:22

## 2022-04-28 RX ADMIN — BUDESONIDE AND FORMOTEROL FUMARATE DIHYDRATE 2 PUFF(S): 160; 4.5 AEROSOL RESPIRATORY (INHALATION) at 06:28

## 2022-04-28 RX ADMIN — PANTOPRAZOLE SODIUM 40 MILLIGRAM(S): 20 TABLET, DELAYED RELEASE ORAL at 06:30

## 2022-04-28 RX ADMIN — GABAPENTIN 300 MILLIGRAM(S): 400 CAPSULE ORAL at 13:31

## 2022-04-28 RX ADMIN — GABAPENTIN 300 MILLIGRAM(S): 400 CAPSULE ORAL at 06:23

## 2022-04-28 RX ADMIN — AMIODARONE HYDROCHLORIDE 100 MILLIGRAM(S): 400 TABLET ORAL at 06:24

## 2022-04-28 RX ADMIN — Medication 600 MILLIGRAM(S): at 06:24

## 2022-04-28 NOTE — DISCHARGE NOTE NURSING/CASE MANAGEMENT/SOCIAL WORK - NSDCPEFALRISK_GEN_ALL_CORE
For information on Fall & Injury Prevention, visit: [email or web address] OR  [email or web address] OR  https://www.cdc.gov/steadi/patient.html

## 2022-04-28 NOTE — PROGRESS NOTE ADULT - ASSESSMENT
80 y/o woman followed in our office w anemia secondary to renal insufficiency and possible myelodysplasia on q 2 week Retacrit as an outpatient now admitted with fever and diagnosed with parainfluenza infection. CBC noted pancytopenia, w values worse than usual baseline in office  Post tx PRBC    -post IVIG and Retacrit Mon 04/25    -pancytopenia due to exacerbation of acute illness, viral ds w baseline compromised marrow function  reactive airway disease due to parainfluenza3 virus infection.   Wheezing appears better    -plts and wbc since normalized  -anemia worse today, no signs of bleeds, maybe due to IVIG effect from yesterday  -continue monitor serial CBC    RECOMMEND  continue supportive care  s/p PRBC today  DC planning nebs/inhalers per pulm  -follow in office post DC. 
80 y/o woman followed in our office w anemia secondary to renal insufficiency and possible myelodysplasia on q 2 week Retacrit as an outpatient now admitted with fever and diagnosed with parainfluenza infection. CBC noted pancytopenia, w values worse than usual baseline in office  Post tx PRBC    -post dued IVIG and Retacrit yesterday  -pancytopenia due to exacerbation of acute illness, viral ds w baseline compromised marrow function  -plts and wbc since normalized  -anemia worse today, no signs of bleeds, maybe due to IVIG effect from yesterday  -continue monitor serial CBC  -
80 yo F with PMHx of COPD (not on home O2), pAF (s/p cardiac ablation on coumadin), T2DM, hip necrosis, aplastic anemia (possible MDS), ?immunodeficiency (recently started on gammaglobulin therapy but unsure of name), CKD, HFpEF presented to the ED due to confusion and difficulty breathing with high fevers, admitted for superimposed bacterial pneumonia and COPD    {[id number]} Problem/Plan - 1:  ·  Problem: Pneumonia.   ·  Plan: CXR showing RUL PNA, parainfluenza positive, likely COPD exacerbation with superimposed bacterial PNA  -s/p Zosyn in the ED, continue with rocephin   -start mucinex 600mg PO Q12h  -urine legionella negative, strep pneumo negative, check MRSA PCR  -blood cx NGTD  -trend temps, wbc curve  -contact precautions for parainfluenza.    {[id number]} Problem/Plan - 2:  ·  Problem: COPD exacerbation.  ·  Plan: seen by pulm Dr. roberson in ED  -started on IV solumedrol 40 Q12h, duonebs q6h  -cont therapeutic interchange for trelegy ellipta  -monitor O2 sat on 2L NC    {[id number]} Problem/Plan - 3:  ·  Problem: Anemia.  ·  Plan: H/H 8.5/26.2, at baseline  -per heme/onc, attempted to receive 1 U pRBC transfusion in ED but spiked a high fever  -now s/p 2 units PRBC transfusion. No gross bleeding. PRBC transfusions PRN.  Hbg stable  -heme/onc consulted, f/u recs.    {[id number]} Problem/Plan - 4:  ·  Problem: Pancytopenia.  ·  Plan: Hb 8.5, leukopenia 2.29, plt 106  -hx of recently diagnosed immunodeficiency, receiving monthly IVIG infusions (s/p IVIG March 18th, next scheduled infusion 04/25)  -follows with Dr. Parker outpatient  -CBC daily  -heme/onc consulted, f/u recs.    {[id number]} Problem/Plan - 5:  ·  Problem: Heart failure.  ·  Plan: hx of diastolic heart failure  -recent TTE showing normal LVEF but stage II diastolic dysfunction  -caution with aggressive IVF hydration  -appears euvolemic at this time.    {[id number]} Problem/Plan - 6:  ·  Problem: Chronic kidney disease, unspecified CKD stage.   ·  Plan: BUN/Cr 25/1.5, baseline Cr appears to be 1.4  -renal indices stable  -caution with nephrotoxic medications.    {[id number]} Problem/Plan - 7:  ·  Problem: Type 2 diabetes mellitus.   ·  Plan: hx of diabetes  -not on insulin  -low dose ISS, accuchecks, hypoglycemia protocol  -f/u HbA1c.    {[id number]} Problem/Plan - 8:  ·  Problem: A-fib.   ·  Plan: paroxysmal, s/p cardiac ablation  -EKG shows pt currently in sinus rhythm  -on cardizem, amiodarone, and coumadin at home, will hold coumadin today with supratherapeutic INR  -takes coumadin 4 mg 4x/week and 6 mg 3x/week at home  -dose coumadin based on daily PT/INR.    {[id number]} Problem/Plan - 9:  ·  Problem: Need for prophylactic measure.   ·  Plan: on coumadin for AF. no need for additional chemoprophylaxis.  Currently with supratherapeutic INR                
82 y/o woman followed in our office w anemia secondary to renal insufficiency and possible myelodysplasia on q 2 week Retacrit as an outpatient now admitted with fever and diagnosed with parainfluenza infection. CBC noted pancytopenia, w values worse than usual baseline in office  Post tx PRBC    -pancytopenia due to exacerbation of acute illness, viral ds w baseline compromised marrow function  -plts since normalized  -anemia and leukopenia persists, but improving; also likely due to poor marrow response  -on Retacrit as outpatient,  iron studies are noted adequate/with iron overload;  will order epogen in hospital  - patient also gets IVIG in office monthly for hypogammaglobulinemia; ordered IVIG 35g with tylenol/benadryl premed for today  -continue present management, monitor serial CBC  - case discussed with hospitalist (Dr. Inman)
82 yo F with PMHx of COPD (not on home O2), pAF (s/p cardiac ablation on coumadin), T2DM, hip necrosis, aplastic anemia (possible MDS), ?immunodeficiency (recently started on gammaglobulin therapy but unsure of name), CKD, HFpEF presented to the ED due to confusion and difficulty breathing with high fevers, admitted for superimposed bacterial pneumonia and COPD     Problem/Plan - 1:  ·  Problem: Pneumonia.   ·  Plan: CXR showing RUL PNA, parainfluenza positive, likely COPD exacerbation with superimposed bacterial PNA  -Continue Ceftriaxone   -cont mucinex 600mg PO Q12h  -urine legionella negative, strep pneumo negative, check MRSA PCR  -blood cx NGTD  -trend temps, wbc curve  -contact precautions for parainfluenza.     Problem/Plan - 2:  ·  Problem: COPD exacerbation.  ·  Plan: seen by pulm Dr. roberson in ED  -taper solumedrol to 40mg IV daily and continue duonebs q6h  -cont Symbicort, Spiriva ,  Montelukast.    -monitor O2 sat on 2L NC     Problem/Plan - 3:  ·  Problem: Anemia.  ·  Plan: H/H 8.5/26.2, at baseline  -per heme/onc, attempted to receive 1 U pRBC transfusion in ED but spiked a high fever  -now s/p 2 units PRBC transfusion. No gross bleeding. PRBC transfusions PRN.  Will repeat hbg@1PM     Problem/Plan - 4:  ·  Problem: Pancytopenia.  MDS exac by acute viral syndrome from  parainfluenza virus   ·  Plan: Hb 8.5, leukopenia 2.29, plt 106  -hx of recently diagnosed immunodeficiency, receiving monthly IVIG infusions (s/p IVIG March 18th, next scheduled infusion 04/25)  Ordered IVIG with premedication  today by hemon  Procrit   -follows with Dr. Parker outpatient  -CBC daily     Problem/Plan - 5:  ·  Problem: Heart failure.  ·  Plan: hx of diastolic heart failure  -recent TTE showing normal LVEF but stage II diastolic dysfunction  -caution with aggressive IVF hydration  -appears euvolemic at this time.     Problem/Plan - 6:  ·  Problem: Chronic kidney disease, unspecified CKD stage.   ·  Plan: BUN/Cr 25/1.5, baseline Cr appears to be 1.4  -renal indices stable  -caution with nephrotoxic medications.     Problem/Plan - 7:  ·  Problem: Type 2 diabetes mellitus.   ·  Plan: hx of diabetes  -not on insulin  -low dose ISS, accuchecks, hypoglycemia protocol   HbA1 is 5.5      Problem/Plan - 8:  ·  Problem: A-fib.   ·  Plan: paroxysmal, s/p cardiac ablation  -EKG shows pt currently in sinus rhythm  -on cardizem, amiodarone, and coumadin at home, will hold coumadin in setting of supra therapeutic INR  -takes coumadin 4 mg 4x/week and 6 mg 3x/week at home  -dose coumadin based on daily PT/INR.     Problem/Plan - 9:  ·  Problem: Need for prophylactic measure.   ·  Plan: on coumadin for AF. no need for additional chemoprophylaxis.  Currently with supra therapeutic INR          
82 yo F with PMHx of COPD (not on home O2), pAF (s/p cardiac ablation on coumadin), T2DM, hip necrosis, aplastic anemia (possible MDS), ?immunodeficiency (recently started on gammaglobulin therapy but unsure of name), CKD, HFpEF presented to the ED due to confusion and difficulty breathing with high fevers, admitted for superimposed bacterial pneumonia and COPD    {[id number]} Problem/Plan - 1:  ·  Problem: Pneumonia.   ·  Plan: CXR showing RUL PNA, parainfluenza positive, likely COPD exacerbation with superimposed bacterial PNA  -s/p Zosyn in the ED, continue with rocephin and Azithromycin  -urine legionella negative, strep pneumo negative, check MRSA PCR  -blood cx NGTD  -trend temps, wbc curve  -contact precautions for parainfluenza.    {[id number]} Problem/Plan - 2:  ·  Problem: COPD exacerbation.  ·  Plan: seen by pulm Dr. roberson in ED  -started on IV solumedrol 40 Q12h, duonebs q6h  -cont therapeutic interchange for trelegy ellipta  -monitor O2 sat on 3L NC    {[id number]} Problem/Plan - 3:  ·  Problem: Anemia.  ·  Plan: H/H 8.5/26.2, at baseline  -per heme/onc, attempted to receive 1 U pRBC transfusion in ED but spiked a high fever  -now s/p 2 units PRBC transfusion.  Not much of a change in hbg from 8.5 to 8.8.  No gross bleeding. PRBC transfusions PRN  -heme/onc consulted, f/u recs.    {[id number]} Problem/Plan - 4:  ·  Problem: Pancytopenia.  ·  Plan: Hb 8.5, leukopenia 2.29, plt 106  -hx of recently diagnosed immunodeficiency, receiving monthly IVIG infusions (s/p IVIG March 18th, next scheduled infusion 04/25)  -follows with Dr. Parker outpatient  -CBC daily  -heme/onc consulted, f/u recs.    {[id number]} Problem/Plan - 5:  ·  Problem: Heart failure.  ·  Plan: hx of diastolic heart failure  -recent TTE showing normal LVEF but stage II diastolic dysfunction  -caution with aggressive IVF hydration  -appears euvolemic at this time.    {[id number]} Problem/Plan - 6:  ·  Problem: Chronic kidney disease, unspecified CKD stage.   ·  Plan: BUN/Cr 25/1.5, baseline Cr appears to be 1.4  -trend renal indices  -caution with nephrotoxic medications.    {[id number]} Problem/Plan - 7:  ·  Problem: Type 2 diabetes mellitus.   ·  Plan: hx of diabetes  -not on insulin  -low dose ISS, accuchecks, hypoglycemia protocol  -f/u HbA1c.    {[id number]} Problem/Plan - 8:  ·  Problem: A-fib.   ·  Plan: paroxysmal, s/p cardiac ablation  -EKG shows pt currently in sinus rhythm  -on cardizem, amiodarone, and coumadin at home, continue  -takes coumadin 4 mg 4x/week and 6 mg 3x/week at home  -dose coumadin based on daily PT/INR.    {[id number]} Problem/Plan - 9:  ·  Problem: Need for prophylactic measure.   ·  Plan: on coumadin for AF. no need for additional chemoprophylaxis.          
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 Found to have acute bronchitis 2/2 parainfluenza infection, but cannot rule out bacterial pneumonia. CXR showed right upper lung opacity.     She remains leukopenic but feels better today. Suspect viral infection is contributing to leukopenia. Fevers resolved and blood cultures currently no growth. Urine legionella and pneumococcal antigens are negative.    -continue ceftriaxone 1g IV q24h while inpatient--when ready for discharge can switch to cefpodoxime 200mg PO BID until 4/26  -discontinue azithromycin  -follow cultures to completion  -monitor WBC  -will sign off, please call ID if any further questions. Thank you.    Kimberly Velazco MD  Division of Infectious Diseases   Cell [phone] between 8am and 6pm   After 6pm and weekends please call ID service at [phone]. 
CKD 3  Pneumonia  Diabetes  Hypertension  h/o CHF  Hypophosphatemia    Stable renal indices. Encourage Po intake. Rx for pneumonia. Monitor blood sugar levels. Insulin coverage as needed. Dietary restriction.   Monitor BP trend. Titrate BP meds as needed. Phosphorpus levels better. Will follow electrolytes and renal function trend. 
CKD 3  Pneumonia  Diabetes  Hypertension  h/o CHF  Hypophosphatemia    Stable renal indices. Encourage Po intake. Rx for pneumonia. Monitor blood sugar levels. Insulin coverage as needed. Dietary restriction.   Monitor BP trend. Titrate BP meds as needed. Phosphorpus supplementation. Will follow electrolytes and renal function trend. 
CKD 3  Pneumonia  Diabetes  Hypertension  h/o CHF  Hypophosphatemia    Stable renal indices. Encourage Po intake. To  continue current meds. Monitor blood sugar levels. Insulin coverage as needed. Dietary restriction.   Monitor BP trend. Titrate BP meds as needed. Will follow electrolytes and renal function trend. D/c planning. 
80 y/o woman followed in our office w anemia secondary to renal insufficiency and possible myelodysplasia on q 2 week Retacrit as an outpatient now admitted with fever and diagnosed with parainfluenza infection. CBC noted pancytopenia, w values worse than usual baseline in office  Post tx PRBC    -pancytopenia due to exacerbation of acute illness, viral ds w baseline compromised marrow function  -plts now normalized  -anemia and leukopenia stable, no intervention needed  -continue present management, monitor serial CBC
80 yo F with PMHx of COPD (not on home O2), pAF (s/p cardiac ablation on coumadin), T2DM, hip necrosis, aplastic anemia (possible MDS), ?immunodeficiency (recently started on gammaglobulin therapy but unsure of name), CKD, HFpEF presented to the ED due to confusion and difficulty breathing with high fevers, admitted for superimposed bacterial pneumonia and COPD     Problem/Plan - 1:  ·  Problem: Pneumonia.   ·  Plan: CXR showing RUL PNA, parainfluenza positive, likely COPD exacerbation with superimposed bacterial PNA  -Continue Ceftriaxone   -cont mucinex 600mg PO Q12h  -urine legionella negative, strep pneumo negative, check MRSA PCR  -blood cx NGTD  -trend temps, wbc curve  -contact precautions for parainfluenza.     Problem/Plan - 2:  ·  Problem: COPD exacerbation.  ·  Plan: seen by pulm Dr. roberson in ED  -taper solumedrol to 40mg IV daily and continue duonebs q6h  -cont Symbicort, Spiriva ,  Montelukast.    -monitor O2 sat on  RA     Problem/Plan - 3:  ·  Problem: Anemia.  ·  Plan: H/H 8.5/26.2, at baseline  -per heme/onc, attempted to receive 1 U pRBC transfusion in ED but spiked a high fever  -now s/p 2 units PRBC transfusion. No gross bleeding. PRBC transfusions PRN.  Will repeat hbg@1PM     Problem/Plan - 4:  ·  Problem: Pancytopenia.  MDS exac by acute viral syndrome from  parainfluenza virus   ·  Plan: Hb 8.5, leukopenia 2.29, plt 106  -hx of recently diagnosed immunodeficiency, receiving monthly IVIG infusions (s/p IVIG March 18th, next scheduled infusion 04/25)  Ordered IVIG with premedication  today by hemon  Procrit   -follows with Dr. Parker outpatient  -CBC daily     Problem/Plan - 5:  ·  Problem: Heart failure.  ·  Plan: hx of diastolic heart failure  -recent TTE showing normal LVEF but stage II diastolic dysfunction  -caution with aggressive IVF hydration  -appears euvolemic at this time.     Problem/Plan - 6:  ·  Problem: Chronic kidney disease, unspecified CKD stage.   ·  Plan: BUN/Cr 25/1.5, baseline Cr appears to be 1.4  -renal indices stable  -caution with nephrotoxic medications.     Problem/Plan - 7:  ·  Problem: Type 2 diabetes mellitus.   ·  Plan: hx of diabetes  -not on insulin  -low dose ISS, accuchecks, hypoglycemia protocol   HbA1 is 5.5      Problem/Plan - 8:  ·  Problem: A-fib.   ·  Plan: paroxysmal, s/p cardiac ablation  -EKG shows pt currently in sinus rhythm  -on cardizem, amiodarone, and coumadin at home, will hold coumadin in setting of supra therapeutic INR  -takes coumadin 4 mg 4x/week and 6 mg 3x/week at home  -dose coumadin based on daily PT/INR.     Problem/Plan - 9:  ·  Problem: Need for prophylactic measure.   ·  Plan: on coumadin for AF. no need for additional chemoprophylaxis.  Currently with supra therapeutic INR          
80 yo F with PMHx of COPD (not on home O2), pAF (s/p cardiac ablation on coumadin), T2DM, hip necrosis, aplastic anemia (possible MDS), ?immunodeficiency (recently started on gammaglobulin therapy but unsure of name), CKD, HFpEF presented to the ED due to confusion and difficulty breathing with high fevers, admitted for superimposed bacterial pneumonia and COPD    {[id number]} Problem/Plan - 1:  ·  Problem: Pneumonia.   ·  Plan: CXR showing RUL PNA, parainfluenza positive, likely COPD exacerbation with superimposed bacterial PNA  -s/p Zosyn in the ED, continue with rocephin   -cont mucinex 600mg PO Q12h  -urine legionella negative, strep pneumo negative, check MRSA PCR  -blood cx NGTD  -trend temps, wbc curve  -contact precautions for parainfluenza.    {[id number]} Problem/Plan - 2:  ·  Problem: COPD exacerbation.  ·  Plan: seen by pulm Dr. roberson in ED  -taper solumedrol to 40mg IV daily and continue duonebs q6h  -cont therapeutic interchange for trelegy ellipta.  Continue Montelukast.    -monitor O2 sat on 2L NC    {[id number]} Problem/Plan - 3:  ·  Problem: Anemia.  ·  Plan: H/H 8.5/26.2, at baseline  -per heme/onc, attempted to receive 1 U pRBC transfusion in ED but spiked a high fever  -now s/p 2 units PRBC transfusion. No gross bleeding. PRBC transfusions PRN.  Will repeat hbg@1PM  -heme/onc consulted, f/u recs.    {[id number]} Problem/Plan - 4:  ·  Problem: Pancytopenia.  ·  Plan: Hb 8.5, leukopenia 2.29, plt 106  -hx of recently diagnosed immunodeficiency, receiving monthly IVIG infusions (s/p IVIG March 18th, next scheduled infusion 04/25)  -follows with Dr. Parker outpatient  -CBC daily  -heme/onc consulted, f/u recs.    {[id number]} Problem/Plan - 5:  ·  Problem: Heart failure.  ·  Plan: hx of diastolic heart failure  -recent TTE showing normal LVEF but stage II diastolic dysfunction  -caution with aggressive IVF hydration  -appears euvolemic at this time.    {[id number]} Problem/Plan - 6:  ·  Problem: Chronic kidney disease, unspecified CKD stage.   ·  Plan: BUN/Cr 25/1.5, baseline Cr appears to be 1.4  -renal indices stable  -caution with nephrotoxic medications.    {[id number]} Problem/Plan - 7:  ·  Problem: Type 2 diabetes mellitus.   ·  Plan: hx of diabetes  -not on insulin  -low dose ISS, accuchecks, hypoglycemia protocol  -f/u HbA1c.    {[id number]} Problem/Plan - 8:  ·  Problem: A-fib.   ·  Plan: paroxysmal, s/p cardiac ablation  -EKG shows pt currently in sinus rhythm  -on cardizem, amiodarone, and coumadin at home, will hold coumadin in setting of supra therapeutic INR  -takes coumadin 4 mg 4x/week and 6 mg 3x/week at home  -dose coumadin based on daily PT/INR.    {[id number]} Problem/Plan - 9:  ·  Problem: Need for prophylactic measure.   ·  Plan: on coumadin for AF. no need for additional chemoprophylaxis.  Currently with supra therapeutic INR                
82 y/o woman followed in our office w anemia secondary to renal insufficiency and possible myelodysplasia on q 2 week Retacrit as an outpatient now admitted with fever and diagnosed with parainfluenza infection. CBC noted pancytopenia, w values worse than usual baseline in office  Post tx PRBC    -pancytopenia due to exacerbation of acute illness, viral ds w baseline compromised marrow function  -plts since normalized  -anemia and leukopenia persists, Hgb lower no signs of gross blood loss, likely due to poor marrow response  -on Retacrit as outpatient, will order iron studies and if adequate then give Procrit while in hospital  -pt reports due for IVIG on Monday, will check office records  -continue present management, monitor serial CBC
80 yo F with PMHx of COPD (not on home O2), pAF (s/p cardiac ablation on coumadin), T2DM, hip necrosis, aplastic anemia (possible MDS), ?immunodeficiency (recently started on gammaglobulin therapy but unsure of name), CKD, HFpEF presented to the ED due to confusion and difficulty breathing with high fevers, admitted for superimposed bacterial pneumonia and COPD     Problem/Plan - 1:  ·  Problem: Pneumonia.   ·  Plan: CXR showing RUL PNA, parainfluenza positive, likely COPD exacerbation with superimposed bacterial PNA  -s/p course of ceftriaxone  -cont mucinex 600mg PO Q12h  -urine legionella negative, strep pneumo negative, check MRSA PCR  -blood cx NGTD  -trend temps, wbc curve  -contact precautions for parainfluenza.     Problem/Plan - 2:  ·  Problem: COPD exacerbation.  ·  Plan: seen by pulm Dr. roberson in ED  -s/p solumedrol, now on prednisone 40mg po qd, continue duonebs q6h  -cont Symbicort, Spiriva , Montelukast.    -monitor O2 sat on  RA  - patient still feels sob, and prefers to stay overnight; care d/w pulm today, Dr Roberson, and agrees with continuing to observe pt     Problem/Plan - 3:  ·  Problem: Anemia.  ·  Plan:  -per heme/onc, attempted to receive 1 U PRBC transfusion in ED but spiked a high fever  -now s/p 2 units PRBC transfusion. No gross bleeding. PRBC transfusions PRN.   -yesterday pt's Hb was 7.8, today at 8.1; care was discussed with heme-onc, they advised to go ahead with another 1 unit PRBC transfusion as pt feels symptomatic with sob and weakness; there is some concern for iron overload, an dpt was advised about this concern, however, pt preferred to proceed with transfusion today; premedicated with tylenol and benadryl     Problem/Plan - 4:  ·  Problem: Pancytopenia.  MDS exac by acute viral syndrome from  parainfluenza virus   ·  Plan: Hb 8.5, leukopenia 2.29, plt 106  -hx of recently diagnosed immunodeficiency, receiving monthly IVIG infusions (s/p IVIG March 18th, next scheduled infusion 04/25)  Ordered IVIG with premedication  today by [facility]  Procrit   -follows with Dr. Parker outpatient  -CBC daily     Problem/Plan - 5:  ·  Problem: Heart failure.  ·  Plan: hx of diastolic heart failure  -recent TTE showing normal LVEF but stage II diastolic dysfunction  -caution with aggressive IVF hydration  -appears euvolemic at this time.     Problem/Plan - 6:  ·  Problem: Chronic kidney disease, unspecified CKD stage.   ·  Plan: BUN/Cr 25/1.5, baseline Cr appears to be 1.4  -renal indices stable  -caution with nephrotoxic medications.     Problem/Plan - 7:  ·  Problem: Type 2 diabetes mellitus.   ·  Plan: hx of diabetes  -not on insulin  -low dose ISS, accuchecks, hypoglycemia protocol   HbA1 is 5.5      Problem/Plan - 8:  ·  Problem: A-fib.   ·  Plan: paroxysmal, s/p cardiac ablation  -EKG shows pt currently in sinus rhythm  -on cardizem, amiodarone, and coumadin at home, will hold coumadin in setting of supra therapeutic INR  -takes coumadin 4 mg 4x/week and 6 mg 3x/week at home  -dose coumadin based on daily PT/INR.     Problem/Plan - 9:  ·  Problem: Need for prophylactic measure.   ·  Plan: on coumadin for AF. no need for additional chemoprophylaxis.  Currently with supra therapeutic INR          
82 yo F with PMHx of COPD (not on home O2), pAF (s/p cardiac ablation on coumadin), T2DM, hip necrosis, aplastic anemia (possible MDS), ?immunodeficiency (recently started on gammaglobulin therapy but unsure of name), CKD, HFpEF presented to the ED due to confusion and difficulty breathing with high fevers, admitted for superimposed bacterial pneumonia and COPD    {[id number]} Problem/Plan - 1:  ·  Problem: Pneumonia.   ·  Plan: CXR showing RUL PNA, parainfluenza positive, likely COPD exacerbation with superimposed bacterial PNA  -s/p Zosyn in the ED, continue with rocephin and Azithromycin  -check urine legionella, strep pneumo, MRSA PCR  -f/u blood cx  -trend temps, wbc curve  -contact precautions for parainfluenza.    {[id number]} Problem/Plan - 2:  ·  Problem: COPD exacerbation.  ·  Plan: seen by pulm Dr. roberson in ED  -started on IV solumedrol 40 Q12h, duonebs q6h  -cont therapeutic interchange for trelegy ellipta  -monitor O2 sat, currently on room air.    {[id number]} Problem/Plan - 3:  ·  Problem: Anemia.  ·  Plan: H/H 8.5/26.2, at baseline  -per heme/onc, attempted to receive 1 U pRBC transfusion in ED but spiked a high fever  -now s/p 2 units PRBC transfusion.  Not much of a change in hbg from 8.5 to 8.8.  No gross bleeding.   -heme/onc consulted, f/u recs.    {[id number]} Problem/Plan - 4:  ·  Problem: Pancytopenia.  ·  Plan: Hb 8.5, leukopenia 2.29, plt 106  -hx of recently diagnosed immunodeficiency, receiving monthly IVIG infusions (s/p IVIG March 18th, next scheduled infusion 04/25)  -follows with Dr. Parker outpatient  -CBC daily  -heme/onc consulted, f/u recs.    {[id number]} Problem/Plan - 5:  ·  Problem: Heart failure.  ·  Plan: hx of diastolic heart failure  -recent TTE showing normal LVEF but stage II diastolic dysfunction  -caution with aggressive IVF hydration  -appears euvolemic at this time.    {[id number]} Problem/Plan - 6:  ·  Problem: Chronic kidney disease, unspecified CKD stage.   ·  Plan: BUN/Cr 25/1.5, baseline Cr appears to be 1.4  -trend renal indices  -caution with nephrotoxic medications.    {[id number]} Problem/Plan - 7:  ·  Problem: Type 2 diabetes mellitus.   ·  Plan: hx of diabetes  -not on insulin  -low dose ISS, accuchecks, hypoglycemia protocol  -f/u HbA1c.    {[id number]} Problem/Plan - 8:  ·  Problem: A-fib.   ·  Plan: paroxysmal, s/p cardiac ablation  -EKG shows pt currently in sinus rhythm  -on cardizem, amiodarone, and coumadin at home, continue  -takes coumadin 4 mg 4x/week and 6 mg 3x/week at home  -dose coumadin based on daily PT/INR.    {[id number]} Problem/Plan - 9:  ·  Problem: Need for prophylactic measure.   ·  Plan: on coumadin for AF. no need for additional chemoprophylaxis.      
History of anemia secondary to renal insufficiency and possible myelodysplasia on q 2 week retacrit as an outpatient now admitted with fever and diagnosed with parainfluenza infection.  Pancytopenia mildly worse than outpatient most likely related to acute illness.      Received 1 unit of PRBC    RVP+  parainfluenza 3+    Hgb 9.1 post transfusion    Leukopenia  WBC declined, likely due to infection      RECOMMENDATION    Follow CBC and transfuse as indicated  Continue medical/pulmonary followup  Supportive measures  Follow WBC.

## 2022-04-28 NOTE — DISCHARGE NOTE NURSING/CASE MANAGEMENT/SOCIAL WORK - NSDCVIVACCINE_GEN_ALL_CORE_FT
Tdap; 23-Aug-2021 22:35; Danii Dubon (RN); [employer]; [id number] (Exp. Date: 01-Oct-2022); IntraMuscular; Deltoid Left.; 0.5 milliLiter(s); VIS (VIS Published: 09-May-2013, VIS Presented: 23-Aug-2021);

## 2022-04-28 NOTE — PROGRESS NOTE ADULT - REASON FOR ADMISSION
superimposed bacterial PNA and COPD exacerbation

## 2022-04-28 NOTE — PROGRESS NOTE ADULT - SUBJECTIVE AND OBJECTIVE BOX
Patient is a 81y old  Female who presents with a chief complaint of superimposed bacterial PNA and COPD exacerbation (23 Apr 2022 19:22)    Patient seen in follow up for CKD 3.        PAST MEDICAL HISTORY:  COPD (chronic obstructive pulmonary disease)    DM (diabetes mellitus)    Pulmonary fibrosis    PAF (paroxysmal atrial fibrillation)    Hiatal hernia    GIB (gastrointestinal bleeding)    Pericarditis    Shoulder fracture, right    Hematoma      MEDICATIONS  (STANDING):  albuterol/ipratropium for Nebulization 3 milliLiter(s) Nebulizer every 6 hours  aMIOdarone    Tablet 100 milliGRAM(s) Oral daily  budesonide  80 MICROgram(s)/formoterol 4.5 MICROgram(s) Inhaler 2 Puff(s) Inhalation two times a day  dextrose 5%. 1000 milliLiter(s) (100 mL/Hr) IV Continuous <Continuous>  dextrose 5%. 1000 milliLiter(s) (50 mL/Hr) IV Continuous <Continuous>  dextrose 50% Injectable 25 Gram(s) IV Push once  dextrose 50% Injectable 12.5 Gram(s) IV Push once  dextrose 50% Injectable 25 Gram(s) IV Push once  diltiazem    Tablet 30 milliGRAM(s) Oral two times a day  folic acid 1 milliGRAM(s) Oral daily  gabapentin 300 milliGRAM(s) Oral three times a day  glucagon  Injectable 1 milliGRAM(s) IntraMuscular once  guaiFENesin  milliGRAM(s) Oral every 12 hours  insulin lispro (ADMELOG) corrective regimen sliding scale   SubCutaneous three times a day before meals  insulin lispro (ADMELOG) corrective regimen sliding scale   SubCutaneous at bedtime  montelukast 10 milliGRAM(s) Oral daily  pantoprazole    Tablet 40 milliGRAM(s) Oral before breakfast  predniSONE   Tablet 40 milliGRAM(s) Oral daily  simvastatin 10 milliGRAM(s) Oral at bedtime  tiotropium 18 MICROgram(s) Capsule 1 Capsule(s) Inhalation daily  traMADol 50 milliGRAM(s) Oral two times a day    MEDICATIONS  (PRN):  acetaminophen     Tablet .. 650 milliGRAM(s) Oral every 6 hours PRN Temp greater or equal to 38C (100.4F), Mild Pain (1 - 3)  dextrose Oral Gel 15 Gram(s) Oral once PRN Blood Glucose LESS THAN 70 milliGRAM(s)/deciliter  dicyclomine 10 milliGRAM(s) Oral two times a day before meals PRN abd pain  guaiFENesin Oral Liquid (Sugar-Free) 200 milliGRAM(s) Oral every 6 hours PRN Cough    T(C): 36.7 (04-28-22 @ 05:32), Max: 37.2 (04-27-22 @ 14:00)  HR: 69 (04-28-22 @ 07:52) (64 - 84)  BP: 168/78 (04-28-22 @ 05:32) (136/76 - 168/78)  RR: 19 (04-28-22 @ 05:32)  SpO2: 90% (04-28-22 @ 07:52)  Wt(kg): --  I&O's Detail      PHYSICAL EXAM:  General: No distress  Respiratory: b/l air entry  Cardiovascular: S1 S2  Gastrointestinal: soft  Extremities:  no edema                      LABORATORY:                        10.2   15.20 )-----------( 156      ( 28 Apr 2022 07:49 )             30.6     04-28    136  |  99  |  33<H>  ----------------------------<  92  4.1   |  30  |  1.40<H>    Ca    9.6      28 Apr 2022 07:49      Sodium, Serum: 136 mmol/L (04-28 @ 07:49)    Potassium, Serum: 4.1 mmol/L (04-28 @ 07:49)    Hemoglobin: 10.2 g/dL (04-28 @ 07:49)  Hemoglobin: 8.1 g/dL (04-27 @ 08:55)  Hemoglobin: 7.8 g/dL (04-26 @ 07:57)    Creatinine, Serum 1.40 (04-28 @ 07:49)  Creatinine, Serum 1.20 (04-26 @ 07:57)

## 2022-04-28 NOTE — PROGRESS NOTE ADULT - PROVIDER SPECIALTY LIST ADULT
Hospitalist
Hospitalist
Nephrology
Pulmonology
Heme/Onc
Heme/Onc
Hospitalist
Pulmonology
Heme/Onc
Heme/Onc
Hospitalist
Infectious Disease
Nephrology
Pulmonology
Pulmonology
Nephrology
Heme/Onc
Heme/Onc

## 2022-04-28 NOTE — PROGRESS NOTE ADULT - SUBJECTIVE AND OBJECTIVE BOX
Patient is a 81y old  Female who presents with a chief complaint of superimposed bacterial PNA and COPD exacerbation (28 Apr 2022 10:28)      INTERVAL HPI/OVERNIGHT EVENTS:    feels better. cough has improved but has not resolved    MEDICATIONS  (STANDING):  albuterol/ipratropium for Nebulization 3 milliLiter(s) Nebulizer every 6 hours  aMIOdarone    Tablet 100 milliGRAM(s) Oral daily  budesonide  80 MICROgram(s)/formoterol 4.5 MICROgram(s) Inhaler 2 Puff(s) Inhalation two times a day  dextrose 5%. 1000 milliLiter(s) (100 mL/Hr) IV Continuous <Continuous>  dextrose 5%. 1000 milliLiter(s) (50 mL/Hr) IV Continuous <Continuous>  dextrose 50% Injectable 25 Gram(s) IV Push once  dextrose 50% Injectable 12.5 Gram(s) IV Push once  dextrose 50% Injectable 25 Gram(s) IV Push once  diltiazem    Tablet 30 milliGRAM(s) Oral two times a day  folic acid 1 milliGRAM(s) Oral daily  gabapentin 300 milliGRAM(s) Oral three times a day  glucagon  Injectable 1 milliGRAM(s) IntraMuscular once  guaiFENesin  milliGRAM(s) Oral every 12 hours  insulin lispro (ADMELOG) corrective regimen sliding scale   SubCutaneous three times a day before meals  insulin lispro (ADMELOG) corrective regimen sliding scale   SubCutaneous at bedtime  montelukast 10 milliGRAM(s) Oral daily  pantoprazole    Tablet 40 milliGRAM(s) Oral before breakfast  predniSONE   Tablet 40 milliGRAM(s) Oral daily  simvastatin 10 milliGRAM(s) Oral at bedtime  tiotropium 18 MICROgram(s) Capsule 1 Capsule(s) Inhalation daily  traMADol 50 milliGRAM(s) Oral two times a day      MEDICATIONS  (PRN):  acetaminophen     Tablet .. 650 milliGRAM(s) Oral every 6 hours PRN Temp greater or equal to 38C (100.4F), Mild Pain (1 - 3)  dextrose Oral Gel 15 Gram(s) Oral once PRN Blood Glucose LESS THAN 70 milliGRAM(s)/deciliter  dicyclomine 10 milliGRAM(s) Oral two times a day before meals PRN abd pain  guaiFENesin Oral Liquid (Sugar-Free) 200 milliGRAM(s) Oral every 6 hours PRN Cough      Allergies    No Known Allergies    Intolerances        PAST MEDICAL & SURGICAL HISTORY:  COPD (chronic obstructive pulmonary disease)    DM (diabetes mellitus)    PAF (paroxysmal atrial fibrillation)  s/p ablation    Hiatal hernia    GIB (gastrointestinal bleeding)    Pericarditis    Shoulder fracture, right    Hematoma  Hematoma of Right wrist    S/P hysterectomy    S/P foot surgery    S/P knee surgery    After cataract  bilateral eye cataract surgically removed    Closed right hip fracture  rigth hip ORIF july 19 2016    S/P IVC filter  nov 2016    S/P carpal tunnel release  Right 2008 &amp; 6/27/17        Vital Signs Last 24 Hrs  T(C): 36.7 (28 Apr 2022 12:40), Max: 37.2 (27 Apr 2022 14:00)  T(F): 98.1 (28 Apr 2022 12:40), Max: 98.9 (27 Apr 2022 14:00)  HR: 80 (28 Apr 2022 12:40) (64 - 82)  BP: 125/64 (28 Apr 2022 12:40) (125/64 - 168/78)  BP(mean): --  RR: 17 (28 Apr 2022 12:40) (17 - 19)  SpO2: 94% (28 Apr 2022 12:40) (90% - 94%)    PHYSICAL EXAMINATION:    GENERAL: The patient is awake and alert in no apparent distress.     HEENT: Head is normocephalic and atraumatic. Extraocular muscles are intact. Mucous membranes are moist.    NECK: Supple.    LUNGS: Clear to auscultation without wheezing, rales or rhonchi; respirations unlabored    HEART: Regular rate and rhythm without murmur.    ABDOMEN: Soft, nontender, and nondistended.      EXTREMITIES: Without any cyanosis, clubbing, rash, lesions or edema.    NEUROLOGIC: Grossly intact.    SKIN: No ulceration or induration present.      LABS:                        10.2   15.20 )-----------( 156      ( 28 Apr 2022 07:49 )             30.6     04-28    136  |  99  |  33<H>  ----------------------------<  92  4.1   |  30  |  1.40<H>    Ca    9.6      28 Apr 2022 07:49      PT/INR - ( 27 Apr 2022 19:52 )   PT: 16.8 sec;   INR: 1.43 ratio               Assessment:    COPD with exacerbation  Parainfluenza Bronchitis  Hx Pulmonary Emboli - S/P IVC filter  Atrial Fibrillation    Plan:    For discharge today  Tapering schedule of Prednisone  Will see in office in 2 weeks  She will will continue Trelegy inhaler at home

## 2022-04-28 NOTE — DISCHARGE NOTE NURSING/CASE MANAGEMENT/SOCIAL WORK - PATIENT PORTAL LINK FT
You can access the FollowMyHealth Patient Portal offered by [facility] by registering at the following website: [email or web address]. By joining [employer]’s FollowMyHealth portal, you will also be able to view your health information using other applications (apps) compatible with our system.

## 2022-05-02 ENCOUNTER — NON-APPOINTMENT (OUTPATIENT)
Age: 82
End: 2022-05-02

## 2022-05-03 ENCOUNTER — APPOINTMENT (OUTPATIENT)
Dept: CARDIOLOGY | Facility: CLINIC | Age: 82
End: 2022-05-03
Payer: MEDICARE

## 2022-05-03 VITALS — HEIGHT: 65 IN | OXYGEN SATURATION: 97 % | HEART RATE: 75 BPM

## 2022-05-03 LAB
INR PPP: 2.2 RATIO
QUALITY CONTROL: YES

## 2022-05-03 PROCEDURE — 93793 ANTICOAG MGMT PT WARFARIN: CPT

## 2022-05-03 PROCEDURE — 85610 PROTHROMBIN TIME: CPT | Mod: QW

## 2022-05-09 ENCOUNTER — APPOINTMENT (OUTPATIENT)
Dept: INTERNAL MEDICINE | Facility: CLINIC | Age: 82
End: 2022-05-09
Payer: MEDICARE

## 2022-05-09 VITALS
BODY MASS INDEX: 34.16 KG/M2 | RESPIRATION RATE: 16 BRPM | HEIGHT: 65 IN | OXYGEN SATURATION: 98 % | DIASTOLIC BLOOD PRESSURE: 76 MMHG | HEART RATE: 72 BPM | WEIGHT: 205 LBS | SYSTOLIC BLOOD PRESSURE: 138 MMHG | TEMPERATURE: 97.2 F

## 2022-05-09 DIAGNOSIS — Z79.01 ENCOUNTER FOR THERAPEUTIC DRUG LVL MONITORING: ICD-10-CM

## 2022-05-09 DIAGNOSIS — Z51.81 ENCOUNTER FOR THERAPEUTIC DRUG LVL MONITORING: ICD-10-CM

## 2022-05-09 LAB
INR PPP: 3.1 RATIO
POCT-PROTHROMBIN TIME: 37.5 SECS
QUALITY CONTROL: YES

## 2022-05-09 PROCEDURE — 99495 TRANSJ CARE MGMT MOD F2F 14D: CPT | Mod: 25

## 2022-05-09 PROCEDURE — 85610 PROTHROMBIN TIME: CPT | Mod: QW

## 2022-05-09 NOTE — PLAN
Spoke with patient, she states that her pharmacy notified her that her medication was recalled. Please advise   [FreeTextEntry1] : Follow up with pulmonologist and hematologist and cardiologist \par Finish antibiotic and steroids \par Hold Warfarin for tonight and resume normal dose tomorrow \par Continue all other medications\par

## 2022-05-09 NOTE — HEALTH RISK ASSESSMENT
[Former] : Former [No] : In the past 12 months have you used drugs other than those required for medical reasons? No [No falls in past year] : Patient reported no falls in the past year [0] : 2) Feeling down, depressed, or hopeless: Not at all (0) [PHQ-2 Negative - No further assessment needed] : PHQ-2 Negative - No further assessment needed [YearQuit] : 1989 [PRT8Spond] : 0

## 2022-05-09 NOTE — HISTORY OF PRESENT ILLNESS
[Post-hospitalization from ___ Hospital] : Post-hospitalization from [unfilled] Hospital [Admitted on: ___] : The patient was admitted on [unfilled] [Discharged on ___] : discharged on [unfilled] [Discharge Summary] : discharge summary [FreeTextEntry2] : MARTINEZ JONES is a 81 year old F who presents today for hospital follow up. Pt went to the ER for difficulty breathing and high fever. Pt was started on antibiotics and had blood transfusions during the hospital course. Pt also started trial of steroids. Pt to follow up with pulmonologist and hematologist.

## 2022-05-09 NOTE — PHYSICAL EXAM
[No Acute Distress] : no acute distress [Well Nourished] : well nourished [Well Developed] : well developed [Well-Appearing] : well-appearing [Normal Voice/Communication] : normal voice/communication [Normal Sclera/Conjunctiva] : normal sclera/conjunctiva [PERRL] : pupils equal round and reactive to light [EOMI] : extraocular movements intact [Normal Outer Ear/Nose] : the outer ears and nose were normal in appearance [No JVD] : no jugular venous distention [No Lymphadenopathy] : no lymphadenopathy [Supple] : supple [Thyroid Normal, No Nodules] : the thyroid was normal and there were no nodules present [No Respiratory Distress] : no respiratory distress  [No Accessory Muscle Use] : no accessory muscle use [Rhonchi Bilateral] : rhonchi were heard diffusely over both lungs [Normal Rate] : normal rate  [Regular Rhythm] : with a regular rhythm [Normal S1, S2] : normal S1 and S2 [No Murmur] : no murmur heard [No Carotid Bruits] : no carotid bruits [No Abdominal Bruit] : a ~M bruit was not heard ~T in the abdomen [No Varicosities] : no varicosities [Pedal Pulses Present] : the pedal pulses are present [No Edema] : there was no peripheral edema [No Palpable Aorta] : no palpable aorta [No Extremity Clubbing/Cyanosis] : no extremity clubbing/cyanosis [Soft] : abdomen soft [Non Tender] : non-tender [Non-distended] : non-distended [No Masses] : no abdominal mass palpated [No HSM] : no HSM [Normal Bowel Sounds] : normal bowel sounds [Normal Supraclavicular Nodes] : no supraclavicular lymphadenopathy [Normal Posterior Cervical Nodes] : no posterior cervical lymphadenopathy [Normal Anterior Cervical Nodes] : no anterior cervical lymphadenopathy [No CVA Tenderness] : no CVA  tenderness [No Spinal Tenderness] : no spinal tenderness [No Joint Swelling] : no joint swelling [Grossly Normal Strength/Tone] : grossly normal strength/tone [No Rash] : no rash [Coordination Grossly Intact] : coordination grossly intact [No Focal Deficits] : no focal deficits [Normal Gait] : normal gait [Deep Tendon Reflexes (DTR)] : deep tendon reflexes were 2+ and symmetric [Speech Grossly Normal] : speech grossly normal [Memory Grossly Normal] : memory grossly normal [Normal Affect] : the affect was normal [Alert and Oriented x3] : oriented to person, place, and time [Normal Mood] : the mood was normal [Normal Insight/Judgement] : insight and judgment were intact

## 2022-05-09 NOTE — HEALTH RISK ASSESSMENT
[Former] : Former [No] : In the past 12 months have you used drugs other than those required for medical reasons? No [No falls in past year] : Patient reported no falls in the past year [0] : 2) Feeling down, depressed, or hopeless: Not at all (0) [PHQ-2 Negative - No further assessment needed] : PHQ-2 Negative - No further assessment needed [YearQuit] : 1989 [[id number]] : 0

## 2022-05-09 NOTE — END OF VISIT
[FreeTextEntry3] : "I, Trina Camilo, personally scribed the services dictated to me by Dr. Severiano Rodas MD in this documentation on 05/09/2022 " \par \par "I Dr. Severiano Rodas MD, personally performed the services described in this documentation on 05/09/2022 for the patient as scribed by Trina Camilo in my presence. I have reviewed and verified that all the information is accurate and true."\par

## 2022-05-09 NOTE — PLAN
[FreeTextEntry1] : Follow up with pulmonologist and hematologist and cardiologist \par Finish antibiotic and steroids \par Hold Warfarin for tonight and resume normal dose tomorrow \par Continue all other medications\par

## 2022-05-12 ENCOUNTER — APPOINTMENT (OUTPATIENT)
Dept: CARE COORDINATION | Facility: HOME HEALTH | Age: 82
End: 2022-05-12
Payer: MEDICARE

## 2022-05-12 VITALS
HEART RATE: 63 BPM | OXYGEN SATURATION: 96 % | DIASTOLIC BLOOD PRESSURE: 80 MMHG | SYSTOLIC BLOOD PRESSURE: 130 MMHG | TEMPERATURE: 97.9 F | RESPIRATION RATE: 17 BRPM

## 2022-05-12 DIAGNOSIS — B34.8 OTHER VIRAL INFECTIONS OF UNSPECIFIED SITE: ICD-10-CM

## 2022-05-12 DIAGNOSIS — J18.9 PNEUMONIA, UNSPECIFIED ORGANISM: ICD-10-CM

## 2022-05-12 PROCEDURE — 99495 TRANSJ CARE MGMT MOD F2F 14D: CPT

## 2022-05-12 RX ORDER — BACLOFEN 10 MG/1
10 TABLET ORAL
Qty: 60 | Refills: 0 | Status: DISCONTINUED | COMMUNITY
Start: 2020-01-06 | End: 2022-05-12

## 2022-05-12 RX ORDER — FERROUS SULFATE TAB EC 324 MG (65 MG FE EQUIVALENT) 324 (65 FE) MG
324 (65 FE) TABLET DELAYED RESPONSE ORAL DAILY
Refills: 0 | Status: DISCONTINUED | COMMUNITY
End: 2022-05-12

## 2022-05-12 RX ORDER — DOXYCYCLINE 100 MG/1
100 TABLET, FILM COATED ORAL
Qty: 14 | Refills: 0 | Status: DISCONTINUED | COMMUNITY
Start: 2019-10-07 | End: 2022-05-12

## 2022-05-12 RX ORDER — DOXYCYCLINE HYCLATE 100 MG/1
100 TABLET ORAL
Qty: 14 | Refills: 0 | Status: DISCONTINUED | COMMUNITY
Start: 2019-12-28 | End: 2022-05-12

## 2022-05-12 RX ORDER — CEFUROXIME AXETIL 250 MG/1
250 TABLET ORAL
Qty: 14 | Refills: 0 | Status: DISCONTINUED | COMMUNITY
Start: 2019-11-01 | End: 2022-05-12

## 2022-05-12 RX ORDER — NORTRIPTYLINE HYDROCHLORIDE 25 MG/1
25 CAPSULE ORAL
Qty: 60 | Refills: 0 | Status: DISCONTINUED | COMMUNITY
Start: 2019-06-03 | End: 2022-05-12

## 2022-05-12 RX ORDER — HYDROCORTISONE ACETATE 25 MG/1
25 SUPPOSITORY RECTAL TWICE DAILY
Qty: 28 | Refills: 0 | Status: DISCONTINUED | COMMUNITY
Start: 2021-07-28 | End: 2022-05-12

## 2022-05-12 RX ORDER — BUDESONIDE 0.5 MG/2ML
0.5 INHALANT ORAL
Refills: 0 | Status: DISCONTINUED | COMMUNITY
End: 2022-05-12

## 2022-05-12 RX ORDER — DILTIAZEM HYDROCHLORIDE 30 MG/1
30 TABLET ORAL
Qty: 180 | Refills: 1 | Status: DISCONTINUED | COMMUNITY
Start: 2019-11-18 | End: 2022-05-12

## 2022-05-12 RX ORDER — AMOXICILLIN AND CLAVULANATE POTASSIUM 875; 125 MG/1; MG/1
875-125 TABLET, COATED ORAL
Qty: 20 | Refills: 0 | Status: DISCONTINUED | COMMUNITY
Start: 2020-02-07 | End: 2022-05-12

## 2022-05-12 RX ORDER — BENZONATATE 200 MG/1
200 CAPSULE ORAL
Qty: 21 | Refills: 0 | Status: DISCONTINUED | COMMUNITY
Start: 2019-12-28 | End: 2022-05-12

## 2022-05-12 RX ORDER — GABAPENTIN 400 MG/1
400 CAPSULE ORAL TWICE DAILY
Refills: 0 | Status: ACTIVE | COMMUNITY
Start: 2018-01-15

## 2022-05-12 NOTE — HISTORY OF PRESENT ILLNESS
[Post-hospitalization from ___ Hospital] : Post-hospitalization from [unfilled] Hospital [Admitted on: ___] : The patient was admitted on [unfilled] [Discharged on ___] : discharged on [unfilled] [FreeTextEntry2] : As per d/c note: 80 yo F with PMHx of COPD (not on home O2), pAF (s/p cardiac ablation on coumadin), T2DM, hip necrosis, aplastic anemia (possible MDS), ?immunodeficiency (recently 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 \par Pt. was admitted with Pulmonary, ID, and Heme/Onc consultation. She was continued on IV antibiotics and steroids for parainfluenza infection with suspected superimposed PNA, and completed a course fo IV ceftriaxone on 4/26., and complete a steroid taper into the outpatient setting. Blood cultures showed no growth. Urine legionella and strep Ag were negative. She was supplied supplemental O2 via nasal canula. Her Hb was, and care was coordinated with heme-onc, she was ordered for PRBC transfusion. She was to follow up with PMD, Pulm, and Heme-Onc in outpatient setting. \par Parainfluenza/ PNA: completed abx and prednisone.  Reports +productive cough greenish.  Attempted mucinex not helpful. Denies fever, chills\par 	 Seen ENT last week- started on gentamycin spray x 1 week and fluticisone which has been helpful.  Spoke with Dr. El Dc after d/c and was given zpak (completed dose) which helped.  Seen Dr Rodas PCP this week no issues.  \par \par COPD: denies any worsening SWAIN, denies SOB, compliant with regimen\par \par HFpEF/PAF:  INR last week 2.2, @ Dr. Rodas this 3.1 (warfarin held x 1 day-Monday night).  Compliant with regimen. \par \par aplastic anemia: seen heme/onc Dr. Parker last week for follow up with gamaglobulin infusion and procrit q every other week\par \par CKD: Seen nephro Dr. Ying during hospitalization, f/u scheduled for June.  Completed outpatient bloodwork as per nephro recommendation\par \par UTD with COVID and booster x 1\par \par

## 2022-05-12 NOTE — PHYSICAL EXAM
[No Acute Distress] : no acute distress [Well Nourished] : well nourished [No Respiratory Distress] : no respiratory distress  [No Accessory Muscle Use] : no accessory muscle use [Normal Rate] : normal rate  [Regular Rhythm] : with a regular rhythm [Pedal Pulses Present] : the pedal pulses are present [No Edema] : there was no peripheral edema [Soft] : abdomen soft [Non Tender] : non-tender [Non-distended] : non-distended [Normal Bowel Sounds] : normal bowel sounds [No Joint Swelling] : no joint swelling [Normal Affect] : the affect was normal [Alert and Oriented x3] : oriented to person, place, and time [Normal Mood] : the mood was normal [de-identified] : +exp wheezing to RUL

## 2022-05-12 NOTE — PLAN
[FreeTextEntry1] : NP discussed case with HCRN\par Reminded of CN/NP role and yellow card, advised to call with any questions/concerns, verbalized understanding

## 2022-05-12 NOTE — REVIEW OF SYSTEMS
[Nasal Discharge] : nasal discharge [Negative] : Psychiatric [FreeTextEntry5] : see HPI  [FreeTextEntry6] : see HPI  [de-identified] : see HPI

## 2022-05-17 ENCOUNTER — APPOINTMENT (OUTPATIENT)
Dept: CARDIOLOGY | Facility: CLINIC | Age: 82
End: 2022-05-17
Payer: MEDICARE

## 2022-05-17 PROCEDURE — 93793 ANTICOAG MGMT PT WARFARIN: CPT

## 2022-05-17 PROCEDURE — 85610 PROTHROMBIN TIME: CPT | Mod: QW

## 2022-05-18 LAB
INR PPP: 2.4 RATIO
QUALITY CONTROL: YES

## 2022-05-26 ENCOUNTER — RX RENEWAL (OUTPATIENT)
Age: 82
End: 2022-05-26

## 2022-06-14 ENCOUNTER — APPOINTMENT (OUTPATIENT)
Dept: CARDIOLOGY | Facility: CLINIC | Age: 82
End: 2022-06-14
Payer: MEDICARE

## 2022-06-14 LAB
INR PPP: 2.4 RATIO
QUALITY CONTROL: YES

## 2022-06-14 PROCEDURE — 93793 ANTICOAG MGMT PT WARFARIN: CPT

## 2022-06-14 PROCEDURE — 85610 PROTHROMBIN TIME: CPT | Mod: QW

## 2022-06-29 ENCOUNTER — APPOINTMENT (OUTPATIENT)
Dept: CARDIOLOGY | Facility: CLINIC | Age: 82
End: 2022-06-29

## 2022-06-29 ENCOUNTER — NON-APPOINTMENT (OUTPATIENT)
Age: 82
End: 2022-06-29

## 2022-06-29 VITALS
HEART RATE: 78 BPM | OXYGEN SATURATION: 96 % | BODY MASS INDEX: 33.49 KG/M2 | HEIGHT: 65 IN | SYSTOLIC BLOOD PRESSURE: 159 MMHG | WEIGHT: 201 LBS | DIASTOLIC BLOOD PRESSURE: 77 MMHG

## 2022-06-29 VITALS — SYSTOLIC BLOOD PRESSURE: 122 MMHG | DIASTOLIC BLOOD PRESSURE: 68 MMHG

## 2022-06-29 PROCEDURE — 99214 OFFICE O/P EST MOD 30 MIN: CPT

## 2022-06-29 PROCEDURE — 93000 ELECTROCARDIOGRAM COMPLETE: CPT

## 2022-06-29 NOTE — CARDIOLOGY SUMMARY
[de-identified] : Sinus  Rhythm \par -RSR(V1) -nondiagnostic. \par  -Nonspecific ST depression  -Nondiagnostic. \par

## 2022-06-29 NOTE — HISTORY OF PRESENT ILLNESS
[FreeTextEntry1] : She is now here for a followup visit.\par Since her last visit she was admitted to  with parainfluenza. She was admitted on 4/20/22 and discharged on 4/28/22.  Pt went to the ER for difficulty breathing and high fever. Pt was started on antibiotics and had blood transfusions during the hospital course. Pt also started trial of steroids.\par \par Since her discharge she is still having a  productive cough. It is felt that her residual symptoms is because of her immune deficiencies. She is now undergoing IVIG therapy. \par She denies any orthopnea or PND. She denies any lower extremity edema. \par  \par She still has significant hip pain. She is now pending an MRI of her spine per her pain management. SHe is considering doing PT.  \par \par She  denies any chest pain, syncope  strokelike symptoms. No reported melena, hematochezia or hematemesis/H/H seems to be stable. Intermittent palpitations, dizzy spells come and go still which is unchanged.\par \par She is compliant with her other medications. \par \par

## 2022-06-29 NOTE — DISCUSSION/SUMMARY
[FreeTextEntry1] : 81 year old woman with a history as listed above presents for a followup visit. \par \par Carolina recetly had a viral infection but has been symptomatic for over 1 month. She will start on a new regimen of  IVIG infusion therapy. She will monitor for side effects.  \par \par  She denies any anginal symptoms. Clinically she is not in decompensated heart failure. Her lower extremity edema has  resolved.  Her physical exam was essentially unrevealing for any significant cardiovascular findings. Her EKG is unchanged from previous. \par \par her major issue today is with hip pain. there seems to be no recourse for her severe hip arthritis. She will try Meloxicam sparingly (a few times a week) to see if it provides some level of relief. She will watch for any signs of bleeding. \par \par Her lightheadedness was likely from mild orthostasis and has essentially resolved.  She will try to get up slower. She feeling better off of the metoprolol.  She had a Ziopatch in 2/2021 that showed symptomatic PACs and PAT episodes. There was no AF seen. She did not have any signs of heart block or symptomatic bradycardia during the monitoring period.   She is tolerating the cardizem 30mg q8. She will continue  Amiodarone 100mg Qday given how symptomatic she is from her PAF previously.  \par \par  Her lipid profile is at goal. \par \par She will have her INR checked in our [facility]. Her goal INR is 2-3 for CVA risk reduction and VTE prevention. \par  \par She will followup with pulmonary (Dr. Gallegos). her last PFTs showed a moderate reduction in diffusion in  10/2020 . She recently had a repeat PFTS. I will try to obtain those records. \par \par Her TSH was normal in 9/2020.  She needs this checked annually as well.  \par \par I asked she return for follow up in 3 months.\par  She will followup with you for all of her other medical needs. \par \par  \par

## 2022-06-29 NOTE — PHYSICAL EXAM
[Well Developed] : well developed [Well Nourished] : well nourished [No Acute Distress] : no acute distress [Normal Conjunctiva] : normal conjunctiva [Normal Venous Pressure] : normal venous pressure [No Carotid Bruit] : no carotid bruit [Normal S1, S2] : normal S1, S2 [No Murmur] : no murmur [No Rub] : no rub [No Gallop] : no gallop [Rhythm Regular] : regular [Normal S1] : normal S1 [Normal S2] : normal S2 [I] : a grade 1 [No Pitting Edema] : no pitting edema present [Clear Lung Fields] : clear lung fields [Good Air Entry] : good air entry [No Respiratory Distress] : no respiratory distress  [Soft] : abdomen soft [Non Tender] : non-tender [No Masses/organomegaly] : no masses/organomegaly [Normal Bowel Sounds] : normal bowel sounds [Normal Gait] : normal gait [No Edema] : no edema [No Cyanosis] : no cyanosis [No Clubbing] : no clubbing [No Varicosities] : no varicosities [No Rash] : no rash [No Skin Lesions] : no skin lesions [Moves all extremities] : moves all extremities [No Focal Deficits] : no focal deficits [Normal Speech] : normal speech [Alert and Oriented] : alert and oriented [Normal memory] : normal memory

## 2022-06-29 NOTE — REVIEW OF SYSTEMS
[Feeling Fatigued] : not feeling fatigued [Cough] : cough [Dizziness] : dizziness [Negative] : Heme/Lymph [FreeTextEntry2] : as per HPI [de-identified] : as per HPI- unchanged

## 2022-07-19 ENCOUNTER — APPOINTMENT (OUTPATIENT)
Dept: CARDIOLOGY | Facility: CLINIC | Age: 82
End: 2022-07-19

## 2022-07-19 LAB
INR PPP: 2.6 RATIO
QUALITY CONTROL: YES

## 2022-07-19 PROCEDURE — 85610 PROTHROMBIN TIME: CPT | Mod: QW

## 2022-07-19 PROCEDURE — 93793 ANTICOAG MGMT PT WARFARIN: CPT

## 2022-08-05 ENCOUNTER — OUTPATIENT (OUTPATIENT)
Dept: OUTPATIENT SERVICES | Facility: HOSPITAL | Age: 82
LOS: 1 days | End: 2022-08-05
Payer: MEDICARE

## 2022-08-05 DIAGNOSIS — H26.40 UNSPECIFIED SECONDARY CATARACT: Chronic | ICD-10-CM

## 2022-08-05 DIAGNOSIS — Z98.890 OTHER SPECIFIED POSTPROCEDURAL STATES: Chronic | ICD-10-CM

## 2022-08-05 DIAGNOSIS — N18.32 CHRONIC KIDNEY DISEASE, STAGE 3B: ICD-10-CM

## 2022-08-05 DIAGNOSIS — D63.1 ANEMIA IN CHRONIC KIDNEY DISEASE: ICD-10-CM

## 2022-08-05 DIAGNOSIS — Z90.710 ACQUIRED ABSENCE OF BOTH CERVIX AND UTERUS: Chronic | ICD-10-CM

## 2022-08-05 DIAGNOSIS — Z98.89 OTHER SPECIFIED POSTPROCEDURAL STATES: Chronic | ICD-10-CM

## 2022-08-05 DIAGNOSIS — S72.001A FRACTURE OF UNSPECIFIED PART OF NECK OF RIGHT FEMUR, INITIAL ENCOUNTER FOR CLOSED FRACTURE: Chronic | ICD-10-CM

## 2022-08-05 DIAGNOSIS — Z95.828 PRESENCE OF OTHER VASCULAR IMPLANTS AND GRAFTS: Chronic | ICD-10-CM

## 2022-08-06 ENCOUNTER — OUTPATIENT (OUTPATIENT)
Dept: OUTPATIENT SERVICES | Facility: HOSPITAL | Age: 82
LOS: 1 days | End: 2022-08-06
Payer: MEDICARE

## 2022-08-06 VITALS
TEMPERATURE: 98 F | DIASTOLIC BLOOD PRESSURE: 72 MMHG | SYSTOLIC BLOOD PRESSURE: 164 MMHG | HEART RATE: 72 BPM | RESPIRATION RATE: 18 BRPM | OXYGEN SATURATION: 93 %

## 2022-08-06 VITALS
TEMPERATURE: 98 F | RESPIRATION RATE: 18 BRPM | WEIGHT: 199.08 LBS | SYSTOLIC BLOOD PRESSURE: 140 MMHG | HEIGHT: 65 IN | DIASTOLIC BLOOD PRESSURE: 65 MMHG | HEART RATE: 61 BPM | OXYGEN SATURATION: 94 %

## 2022-08-06 DIAGNOSIS — H26.40 UNSPECIFIED SECONDARY CATARACT: Chronic | ICD-10-CM

## 2022-08-06 DIAGNOSIS — Z98.890 OTHER SPECIFIED POSTPROCEDURAL STATES: Chronic | ICD-10-CM

## 2022-08-06 DIAGNOSIS — Z90.710 ACQUIRED ABSENCE OF BOTH CERVIX AND UTERUS: Chronic | ICD-10-CM

## 2022-08-06 DIAGNOSIS — Z98.89 OTHER SPECIFIED POSTPROCEDURAL STATES: Chronic | ICD-10-CM

## 2022-08-06 DIAGNOSIS — S72.001A FRACTURE OF UNSPECIFIED PART OF NECK OF RIGHT FEMUR, INITIAL ENCOUNTER FOR CLOSED FRACTURE: Chronic | ICD-10-CM

## 2022-08-06 DIAGNOSIS — D50.0 IRON DEFICIENCY ANEMIA SECONDARY TO BLOOD LOSS (CHRONIC): ICD-10-CM

## 2022-08-06 DIAGNOSIS — Z95.828 PRESENCE OF OTHER VASCULAR IMPLANTS AND GRAFTS: Chronic | ICD-10-CM

## 2022-08-06 PROCEDURE — P9016: CPT

## 2022-08-06 PROCEDURE — 86923 COMPATIBILITY TEST ELECTRIC: CPT

## 2022-08-06 PROCEDURE — 86850 RBC ANTIBODY SCREEN: CPT

## 2022-08-06 PROCEDURE — 86900 BLOOD TYPING SEROLOGIC ABO: CPT

## 2022-08-06 PROCEDURE — 86901 BLOOD TYPING SEROLOGIC RH(D): CPT

## 2022-08-06 PROCEDURE — 85027 COMPLETE CBC AUTOMATED: CPT

## 2022-08-06 PROCEDURE — 36415 COLL VENOUS BLD VENIPUNCTURE: CPT

## 2022-08-06 PROCEDURE — 36430 TRANSFUSION BLD/BLD COMPNT: CPT

## 2022-08-06 RX ORDER — DIPHENHYDRAMINE HCL 50 MG
25 CAPSULE ORAL ONCE
Refills: 0 | Status: DISCONTINUED | OUTPATIENT
Start: 2022-08-06 | End: 2022-08-06

## 2022-08-06 RX ORDER — ACETAMINOPHEN 500 MG
650 TABLET ORAL ONCE
Refills: 0 | Status: COMPLETED | OUTPATIENT
Start: 2022-08-06 | End: 2022-08-06

## 2022-08-06 RX ORDER — AMLODIPINE BESYLATE 2.5 MG/1
2.5 TABLET ORAL ONCE
Refills: 0 | Status: COMPLETED | OUTPATIENT
Start: 2022-08-06 | End: 2022-08-06

## 2022-08-06 RX ORDER — DIPHENHYDRAMINE HCL 50 MG
25 CAPSULE ORAL ONCE
Refills: 0 | Status: COMPLETED | OUTPATIENT
Start: 2022-08-06 | End: 2022-08-06

## 2022-08-06 RX ADMIN — AMLODIPINE BESYLATE 2.5 MILLIGRAM(S): 2.5 TABLET ORAL at 18:58

## 2022-08-06 RX ADMIN — Medication 650 MILLIGRAM(S): at 10:27

## 2022-08-06 RX ADMIN — Medication 25 MILLIGRAM(S): at 10:27

## 2022-08-06 NOTE — PROVIDER CONTACT NOTE (OTHER) - ASSESSMENT
post 2nd unit of PRBC pt B/P 174/78 electronically. 164/72 manually. pt asymptomatic no s/s of SOB or chest pain at this time.

## 2022-08-06 NOTE — PROVIDER CONTACT NOTE (OTHER) - ACTION/TREATMENT ORDERED:
per md ok for telephone orders for 1 unit of prbc, tylenol PO 650mg, benadryl 25mg PO both  pre blood transfusion. Per md he will be in to see pt. No other new orders.
per md telephone orders for one time dose of Norvasc 2.5mg PO. per md have pt call office if systolic is greater than 150.  No other new orders for now

## 2022-08-11 ENCOUNTER — NON-APPOINTMENT (OUTPATIENT)
Age: 82
End: 2022-08-11

## 2022-08-15 ENCOUNTER — APPOINTMENT (OUTPATIENT)
Dept: INTERNAL MEDICINE | Facility: CLINIC | Age: 82
End: 2022-08-15

## 2022-08-15 VITALS — DIASTOLIC BLOOD PRESSURE: 70 MMHG | SYSTOLIC BLOOD PRESSURE: 120 MMHG

## 2022-08-15 VITALS
WEIGHT: 200 LBS | HEART RATE: 74 BPM | SYSTOLIC BLOOD PRESSURE: 120 MMHG | OXYGEN SATURATION: 97 % | RESPIRATION RATE: 14 BRPM | HEIGHT: 65 IN | TEMPERATURE: 98 F | BODY MASS INDEX: 33.32 KG/M2 | DIASTOLIC BLOOD PRESSURE: 70 MMHG

## 2022-08-15 LAB
INR PPP: 2.5 RATIO
POCT-PROTHROMBIN TIME: 29.6 SECS
QUALITY CONTROL: YES

## 2022-08-15 PROCEDURE — 99214 OFFICE O/P EST MOD 30 MIN: CPT | Mod: 25

## 2022-08-15 PROCEDURE — 85610 PROTHROMBIN TIME: CPT | Mod: QW

## 2022-08-15 NOTE — END OF VISIT
[FreeTextEntry3] : "I, Yumiko Kline, personally scribed the services dictated to me by Dr. Severiano Rodas MD in this documentation on 08/15/2022 " \par \par "I Dr. Severiano Rodas MD, personally performed the services described in this documentation on 08/15/2022 for the patient as scribed by Yumiko Kline in my presence. I have reviewed and verified that all the information is accurate and true."

## 2022-08-15 NOTE — HISTORY OF PRESENT ILLNESS
[Spouse] : spouse [Family Member] : family member [FreeTextEntry1] : follow up [de-identified] : MARTINEZ JONES is a 81 year old F who presents today for follow up for blood pressure. She had 2 transfusions 1 week ago.  \enmanuel has A fib

## 2022-08-15 NOTE — PLAN
[FreeTextEntry1] : Advised to continue to monitor BP at home\par Return in 2 weeks\par continue medications\par Stayed the same, repeat 1 month 2.5

## 2022-08-15 NOTE — HEALTH RISK ASSESSMENT
[Never] : Never [No] : In the past 12 months have you used drugs other than those required for medical reasons? No [No falls in past year] : Patient reported no falls in the past year [0] : 2) Feeling down, depressed, or hopeless: Not at all (0) [PHQ-2 Negative - No further assessment needed] : PHQ-2 Negative - No further assessment needed [[id number]] : 0

## 2022-08-16 ENCOUNTER — APPOINTMENT (OUTPATIENT)
Dept: CARDIOLOGY | Facility: CLINIC | Age: 82
End: 2022-08-16

## 2022-08-16 LAB
INR PPP: 2.4 RATIO
QUALITY CONTROL: YES

## 2022-08-16 PROCEDURE — 85610 PROTHROMBIN TIME: CPT | Mod: QW

## 2022-08-16 PROCEDURE — 93793 ANTICOAG MGMT PT WARFARIN: CPT

## 2022-08-26 ENCOUNTER — NON-APPOINTMENT (OUTPATIENT)
Age: 82
End: 2022-08-26

## 2022-08-29 ENCOUNTER — RX RENEWAL (OUTPATIENT)
Age: 82
End: 2022-08-29

## 2022-08-31 ENCOUNTER — APPOINTMENT (OUTPATIENT)
Dept: INTERNAL MEDICINE | Facility: CLINIC | Age: 82
End: 2022-08-31

## 2022-08-31 VITALS
OXYGEN SATURATION: 95 % | SYSTOLIC BLOOD PRESSURE: 128 MMHG | HEIGHT: 65 IN | HEART RATE: 68 BPM | DIASTOLIC BLOOD PRESSURE: 82 MMHG | BODY MASS INDEX: 32.82 KG/M2 | WEIGHT: 197 LBS | TEMPERATURE: 97.4 F | RESPIRATION RATE: 16 BRPM

## 2022-08-31 PROCEDURE — 99214 OFFICE O/P EST MOD 30 MIN: CPT

## 2022-08-31 NOTE — HISTORY OF PRESENT ILLNESS
[FreeTextEntry1] : Follow up [de-identified] : MARTINEZ JONES is a 81 year old F who presents today for HTN\par monitoring at home and is stable \par to see Hematologist for Anemia

## 2022-08-31 NOTE — HEALTH RISK ASSESSMENT
[Former] : Former [No] : In the past 12 months have you used drugs other than those required for medical reasons? No [No falls in past year] : Patient reported no falls in the past year [0] : 2) Feeling down, depressed, or hopeless: Not at all (0) [YearQuit] : 1990 [NDP7Apnoj] : 0

## 2022-09-13 ENCOUNTER — APPOINTMENT (OUTPATIENT)
Dept: CARDIOLOGY | Facility: CLINIC | Age: 82
End: 2022-09-13

## 2022-09-13 PROCEDURE — 85610 PROTHROMBIN TIME: CPT | Mod: QW

## 2022-09-13 PROCEDURE — 93793 ANTICOAG MGMT PT WARFARIN: CPT

## 2022-09-14 LAB
INR PPP: 1.8 RATIO
QUALITY CONTROL: YES

## 2022-09-20 ENCOUNTER — APPOINTMENT (OUTPATIENT)
Dept: CARDIOLOGY | Facility: CLINIC | Age: 82
End: 2022-09-20

## 2022-09-20 LAB
INR PPP: 2.2 RATIO
QUALITY CONTROL: YES

## 2022-09-20 PROCEDURE — 93793 ANTICOAG MGMT PT WARFARIN: CPT

## 2022-09-20 PROCEDURE — 85610 PROTHROMBIN TIME: CPT | Mod: QW

## 2022-09-27 ENCOUNTER — NON-APPOINTMENT (OUTPATIENT)
Age: 82
End: 2022-09-27

## 2022-09-28 ENCOUNTER — RX RENEWAL (OUTPATIENT)
Age: 82
End: 2022-09-28

## 2022-09-30 ENCOUNTER — APPOINTMENT (OUTPATIENT)
Dept: CARDIOLOGY | Facility: CLINIC | Age: 82
End: 2022-09-30

## 2022-09-30 ENCOUNTER — NON-APPOINTMENT (OUTPATIENT)
Age: 82
End: 2022-09-30

## 2022-09-30 VITALS
SYSTOLIC BLOOD PRESSURE: 138 MMHG | DIASTOLIC BLOOD PRESSURE: 79 MMHG | WEIGHT: 195 LBS | HEART RATE: 74 BPM | HEIGHT: 63 IN | BODY MASS INDEX: 34.55 KG/M2 | OXYGEN SATURATION: 97 %

## 2022-09-30 VITALS — SYSTOLIC BLOOD PRESSURE: 110 MMHG | DIASTOLIC BLOOD PRESSURE: 70 MMHG

## 2022-09-30 DIAGNOSIS — I73.9 PERIPHERAL VASCULAR DISEASE, UNSPECIFIED: ICD-10-CM

## 2022-09-30 PROCEDURE — 99214 OFFICE O/P EST MOD 30 MIN: CPT

## 2022-09-30 PROCEDURE — 93000 ELECTROCARDIOGRAM COMPLETE: CPT

## 2022-09-30 NOTE — HISTORY OF PRESENT ILLNESS
[FreeTextEntry1] : She is now here for a followup visit.\par Since her last visit she recently had a high blood pressure reading after receiving PRBC transfusion. She was given Norvasc x 1 and it came down. her BP at home is ranging 120-140/70-80. \par \par \par She  denies any chest pain, syncope  strokelike symptoms. No reported melena, hematochezia or hematemesis. Intermittent palpitations, dizzy spells come and go still which is unchanged. \par She denies any orthopnea or PND. She denies any lower extremity edema. \par \par She still has significant hip pain. She is going to pain management. \par \par For her immune deficiencies she is still  undergoing IVIG therapy. \par She is compliant with her other medications. \par \par

## 2022-09-30 NOTE — REVIEW OF SYSTEMS
[Cough] : cough [Dizziness] : dizziness [Negative] : Heme/Lymph [Feeling Fatigued] : not feeling fatigued [FreeTextEntry2] : as per HPI [de-identified] : as per HPI- unchanged

## 2022-09-30 NOTE — DISCUSSION/SUMMARY
[FreeTextEntry1] : 81 year old woman with a history as listed above presents for a followup visit. \par \par Carolina recently had an episode of HTN likley form her PRBC infusion. her home readings thus far have been controlled. She will fu with heme. She may need Hydralazine 25mg PRN for SBP >180. \par \par She will continue on a regimen of  IVIG infusion therapy. She will monitor for side effects.  \par \par  She denies any anginal symptoms. Clinically she is not in decompensated heart failure. Her lower extremity edema has  resolved.  Her physical exam was essentially unrevealing for any significant cardiovascular findings. Her EKG is unchanged from previous. \par \par her major issue today is with hip pain. there seems to be no recourse for her severe hip arthritis. She will try Meloxicam sparingly (a few times a week) to see if it provides some level of relief. She will watch for any signs of bleeding. \par \par Her lightheadedness was likely from mild orthostasis and has essentially resolved.  She will try to get up slower. She feeling better off of the metoprolol.  She had a Ziopatch in 2/2021 that showed symptomatic PACs and PAT episodes. There was no AF seen. She did not have any signs of heart block or symptomatic bradycardia during the monitoring period.   She is tolerating the cardizem 30mg q8. She will continue  Amiodarone 100mg Qday given how symptomatic she is from her PAF previously.\par  Her lipid profile is at goal. \par \par She will have her INR checked in our [facility]. Her goal INR is 2-3 for CVA risk reduction and VTE prevention. \par  \par She will followup with pulmonary (Dr. Gallegos). her last PFTs showed a moderate reduction in diffusion in  10/2020 . She going for repeat PFTs soon. \par \par I asked she return for follow up in 3 months.\par  She will followup with you for all of her other medical needs. \par \par  \par   [EKG obtained to assist in diagnosis and management of assessed problem(s)] : EKG obtained to assist in diagnosis and management of assessed problem(s)

## 2022-09-30 NOTE — CARDIOLOGY SUMMARY
[de-identified] : Sinus  Rhythm \par -RSR(V1) -nondiagnostic. \par  -Nonspecific ST depression  -Nondiagnostic. \par  [de-identified] : 4/2021 normal LV function w moderate LVH. moderate PHTN 55mmHG

## 2022-10-05 ENCOUNTER — INPATIENT (INPATIENT)
Facility: HOSPITAL | Age: 82
LOS: 9 days | Discharge: HOME | DRG: 193 | End: 2022-10-15
Attending: STUDENT IN AN ORGANIZED HEALTH CARE EDUCATION/TRAINING PROGRAM | Admitting: INTERNAL MEDICINE
Payer: MEDICARE

## 2022-10-05 VITALS
DIASTOLIC BLOOD PRESSURE: 74 MMHG | RESPIRATION RATE: 16 BRPM | HEART RATE: 81 BPM | HEIGHT: 65 IN | SYSTOLIC BLOOD PRESSURE: 129 MMHG | TEMPERATURE: 98 F | OXYGEN SATURATION: 96 % | WEIGHT: 195.11 LBS

## 2022-10-05 DIAGNOSIS — A41.9 SEPSIS, UNSPECIFIED ORGANISM: ICD-10-CM

## 2022-10-05 DIAGNOSIS — I10 ESSENTIAL (PRIMARY) HYPERTENSION: ICD-10-CM

## 2022-10-05 DIAGNOSIS — Z29.9 ENCOUNTER FOR PROPHYLACTIC MEASURES, UNSPECIFIED: ICD-10-CM

## 2022-10-05 DIAGNOSIS — E11.9 TYPE 2 DIABETES MELLITUS WITHOUT COMPLICATIONS: ICD-10-CM

## 2022-10-05 DIAGNOSIS — K59.00 CONSTIPATION, UNSPECIFIED: ICD-10-CM

## 2022-10-05 DIAGNOSIS — I48.0 PAROXYSMAL ATRIAL FIBRILLATION: ICD-10-CM

## 2022-10-05 DIAGNOSIS — Z95.828 PRESENCE OF OTHER VASCULAR IMPLANTS AND GRAFTS: Chronic | ICD-10-CM

## 2022-10-05 DIAGNOSIS — J44.9 CHRONIC OBSTRUCTIVE PULMONARY DISEASE, UNSPECIFIED: ICD-10-CM

## 2022-10-05 DIAGNOSIS — K21.9 GASTRO-ESOPHAGEAL REFLUX DISEASE WITHOUT ESOPHAGITIS: ICD-10-CM

## 2022-10-05 DIAGNOSIS — R53.1 WEAKNESS: ICD-10-CM

## 2022-10-05 DIAGNOSIS — Z98.89 OTHER SPECIFIED POSTPROCEDURAL STATES: Chronic | ICD-10-CM

## 2022-10-05 DIAGNOSIS — H26.40 UNSPECIFIED SECONDARY CATARACT: Chronic | ICD-10-CM

## 2022-10-05 DIAGNOSIS — G89.29 OTHER CHRONIC PAIN: ICD-10-CM

## 2022-10-05 DIAGNOSIS — D61.9 APLASTIC ANEMIA, UNSPECIFIED: ICD-10-CM

## 2022-10-05 DIAGNOSIS — S72.001A FRACTURE OF UNSPECIFIED PART OF NECK OF RIGHT FEMUR, INITIAL ENCOUNTER FOR CLOSED FRACTURE: Chronic | ICD-10-CM

## 2022-10-05 DIAGNOSIS — Z98.890 OTHER SPECIFIED POSTPROCEDURAL STATES: Chronic | ICD-10-CM

## 2022-10-05 DIAGNOSIS — Z90.710 ACQUIRED ABSENCE OF BOTH CERVIX AND UTERUS: Chronic | ICD-10-CM

## 2022-10-05 DIAGNOSIS — J18.9 PNEUMONIA, UNSPECIFIED ORGANISM: ICD-10-CM

## 2022-10-05 DIAGNOSIS — N17.9 ACUTE KIDNEY FAILURE, UNSPECIFIED: ICD-10-CM

## 2022-10-05 LAB
ALBUMIN SERPL ELPH-MCNC: 3.8 G/DL — SIGNIFICANT CHANGE UP (ref 3.3–5)
ALP SERPL-CCNC: 69 U/L — SIGNIFICANT CHANGE UP (ref 40–120)
ALT FLD-CCNC: 27 U/L — SIGNIFICANT CHANGE UP (ref 12–78)
ANION GAP SERPL CALC-SCNC: 5 MMOL/L — SIGNIFICANT CHANGE UP (ref 5–17)
APPEARANCE UR: CLEAR — SIGNIFICANT CHANGE UP
APTT BLD: 37 SEC — HIGH (ref 27.5–35.5)
AST SERPL-CCNC: 21 U/L — SIGNIFICANT CHANGE UP (ref 15–37)
BASOPHILS # BLD AUTO: 0 K/UL — SIGNIFICANT CHANGE UP (ref 0–0.2)
BASOPHILS NFR BLD AUTO: 0 % — SIGNIFICANT CHANGE UP (ref 0–2)
BILIRUB SERPL-MCNC: 1.1 MG/DL — SIGNIFICANT CHANGE UP (ref 0.2–1.2)
BILIRUB UR-MCNC: NEGATIVE — SIGNIFICANT CHANGE UP
BUN SERPL-MCNC: 37 MG/DL — HIGH (ref 7–23)
CALCIUM SERPL-MCNC: 9.5 MG/DL — SIGNIFICANT CHANGE UP (ref 8.5–10.1)
CHLORIDE SERPL-SCNC: 105 MMOL/L — SIGNIFICANT CHANGE UP (ref 96–108)
CO2 SERPL-SCNC: 27 MMOL/L — SIGNIFICANT CHANGE UP (ref 22–31)
COLOR SPEC: YELLOW — SIGNIFICANT CHANGE UP
CREAT SERPL-MCNC: 1.7 MG/DL — HIGH (ref 0.5–1.3)
DIFF PNL FLD: NEGATIVE — SIGNIFICANT CHANGE UP
EGFR: 30 ML/MIN/1.73M2 — LOW
EOSINOPHIL # BLD AUTO: 0 K/UL — SIGNIFICANT CHANGE UP (ref 0–0.5)
EOSINOPHIL NFR BLD AUTO: 0 % — SIGNIFICANT CHANGE UP (ref 0–6)
GLUCOSE SERPL-MCNC: 116 MG/DL — HIGH (ref 70–99)
GLUCOSE UR QL: NEGATIVE — SIGNIFICANT CHANGE UP
HCT VFR BLD CALC: 28.7 % — LOW (ref 34.5–45)
HGB BLD-MCNC: 9.1 G/DL — LOW (ref 11.5–15.5)
INR BLD: 2.22 RATIO — HIGH (ref 0.88–1.16)
KETONES UR-MCNC: NEGATIVE — SIGNIFICANT CHANGE UP
LACTATE SERPL-SCNC: 1.7 MMOL/L — SIGNIFICANT CHANGE UP (ref 0.7–2)
LACTATE SERPL-SCNC: 2.2 MMOL/L — HIGH (ref 0.7–2)
LEUKOCYTE ESTERASE UR-ACNC: ABNORMAL
LYMPHOCYTES # BLD AUTO: 1.48 K/UL — SIGNIFICANT CHANGE UP (ref 1–3.3)
LYMPHOCYTES # BLD AUTO: 15 % — SIGNIFICANT CHANGE UP (ref 13–44)
MCHC RBC-ENTMCNC: 31.6 PG — SIGNIFICANT CHANGE UP (ref 27–34)
MCHC RBC-ENTMCNC: 31.7 GM/DL — LOW (ref 32–36)
MCV RBC AUTO: 99.7 FL — SIGNIFICANT CHANGE UP (ref 80–100)
MONOCYTES # BLD AUTO: 0.99 K/UL — HIGH (ref 0–0.9)
MONOCYTES NFR BLD AUTO: 10 % — SIGNIFICANT CHANGE UP (ref 2–14)
NEUTROPHILS # BLD AUTO: 7.41 K/UL — HIGH (ref 1.8–7.4)
NEUTROPHILS NFR BLD AUTO: 53 % — SIGNIFICANT CHANGE UP (ref 43–77)
NITRITE UR-MCNC: NEGATIVE — SIGNIFICANT CHANGE UP
NRBC # BLD: SIGNIFICANT CHANGE UP /100 WBCS (ref 0–0)
PH UR: 5 — SIGNIFICANT CHANGE UP (ref 5–8)
PLATELET # BLD AUTO: 226 K/UL — SIGNIFICANT CHANGE UP (ref 150–400)
POTASSIUM SERPL-MCNC: 5.1 MMOL/L — SIGNIFICANT CHANGE UP (ref 3.5–5.3)
POTASSIUM SERPL-SCNC: 5.1 MMOL/L — SIGNIFICANT CHANGE UP (ref 3.5–5.3)
PROT SERPL-MCNC: 7.5 G/DL — SIGNIFICANT CHANGE UP (ref 6–8.3)
PROT UR-MCNC: 15
PROTHROM AB SERPL-ACNC: 26.2 SEC — HIGH (ref 10.5–13.4)
RAPID RVP RESULT: SIGNIFICANT CHANGE UP
RBC # BLD: 2.88 M/UL — LOW (ref 3.8–5.2)
RBC # FLD: 23.8 % — HIGH (ref 10.3–14.5)
SARS-COV-2 RNA SPEC QL NAA+PROBE: SIGNIFICANT CHANGE UP
SARS-COV-2 RNA SPEC QL NAA+PROBE: SIGNIFICANT CHANGE UP
SODIUM SERPL-SCNC: 137 MMOL/L — SIGNIFICANT CHANGE UP (ref 135–145)
SP GR SPEC: 1.01 — SIGNIFICANT CHANGE UP (ref 1.01–1.02)
UROBILINOGEN FLD QL: NEGATIVE — SIGNIFICANT CHANGE UP
WBC # BLD: 9.88 K/UL — SIGNIFICANT CHANGE UP (ref 3.8–10.5)
WBC # FLD AUTO: 9.88 K/UL — SIGNIFICANT CHANGE UP (ref 3.8–10.5)

## 2022-10-05 PROCEDURE — 99223 1ST HOSP IP/OBS HIGH 75: CPT | Mod: GC

## 2022-10-05 PROCEDURE — 99285 EMERGENCY DEPT VISIT HI MDM: CPT | Mod: FS,CS

## 2022-10-05 PROCEDURE — 71045 X-RAY EXAM CHEST 1 VIEW: CPT | Mod: 26

## 2022-10-05 PROCEDURE — 93010 ELECTROCARDIOGRAM REPORT: CPT

## 2022-10-05 RX ORDER — AMIODARONE HYDROCHLORIDE 400 MG/1
100 TABLET ORAL DAILY
Refills: 0 | Status: DISCONTINUED | OUTPATIENT
Start: 2022-10-05 | End: 2022-10-15

## 2022-10-05 RX ORDER — GLUCAGON INJECTION, SOLUTION 0.5 MG/.1ML
1 INJECTION, SOLUTION SUBCUTANEOUS ONCE
Refills: 0 | Status: DISCONTINUED | OUTPATIENT
Start: 2022-10-05 | End: 2022-10-15

## 2022-10-05 RX ORDER — DILTIAZEM HCL 120 MG
30 CAPSULE, EXT RELEASE 24 HR ORAL
Refills: 0 | Status: DISCONTINUED | OUTPATIENT
Start: 2022-10-05 | End: 2022-10-15

## 2022-10-05 RX ORDER — PIPERACILLIN AND TAZOBACTAM 4; .5 G/20ML; G/20ML
3.38 INJECTION, POWDER, LYOPHILIZED, FOR SOLUTION INTRAVENOUS ONCE
Refills: 0 | Status: DISCONTINUED | OUTPATIENT
Start: 2022-10-05 | End: 2022-10-05

## 2022-10-05 RX ORDER — ERYTHROPOIETIN 10000 [IU]/ML
0 INJECTION, SOLUTION INTRAVENOUS; SUBCUTANEOUS
Qty: 0 | Refills: 0 | DISCHARGE

## 2022-10-05 RX ORDER — DEXTROSE 50 % IN WATER 50 %
15 SYRINGE (ML) INTRAVENOUS ONCE
Refills: 0 | Status: DISCONTINUED | OUTPATIENT
Start: 2022-10-05 | End: 2022-10-15

## 2022-10-05 RX ORDER — ACETAMINOPHEN 500 MG
650 TABLET ORAL EVERY 6 HOURS
Refills: 0 | Status: DISCONTINUED | OUTPATIENT
Start: 2022-10-05 | End: 2022-10-15

## 2022-10-05 RX ORDER — DEXTROSE 50 % IN WATER 50 %
25 SYRINGE (ML) INTRAVENOUS ONCE
Refills: 0 | Status: DISCONTINUED | OUTPATIENT
Start: 2022-10-05 | End: 2022-10-15

## 2022-10-05 RX ORDER — TRAMADOL HYDROCHLORIDE 50 MG/1
50 TABLET ORAL
Refills: 0 | Status: DISCONTINUED | OUTPATIENT
Start: 2022-10-05 | End: 2022-10-12

## 2022-10-05 RX ORDER — FOLIC ACID 0.8 MG
1 TABLET ORAL DAILY
Refills: 0 | Status: DISCONTINUED | OUTPATIENT
Start: 2022-10-05 | End: 2022-10-08

## 2022-10-05 RX ORDER — INSULIN LISPRO 100/ML
VIAL (ML) SUBCUTANEOUS
Refills: 0 | Status: DISCONTINUED | OUTPATIENT
Start: 2022-10-05 | End: 2022-10-08

## 2022-10-05 RX ORDER — GABAPENTIN 400 MG/1
1 CAPSULE ORAL
Qty: 0 | Refills: 0 | DISCHARGE

## 2022-10-05 RX ORDER — WARFARIN SODIUM 2.5 MG/1
4 TABLET ORAL ONCE
Refills: 0 | Status: COMPLETED | OUTPATIENT
Start: 2022-10-05 | End: 2022-10-05

## 2022-10-05 RX ORDER — SODIUM CHLORIDE 9 MG/ML
1000 INJECTION, SOLUTION INTRAVENOUS
Refills: 0 | Status: DISCONTINUED | OUTPATIENT
Start: 2022-10-05 | End: 2022-10-15

## 2022-10-05 RX ORDER — VANCOMYCIN HCL 1 G
VIAL (EA) INTRAVENOUS
Refills: 0 | Status: DISCONTINUED | OUTPATIENT
Start: 2022-10-05 | End: 2022-10-05

## 2022-10-05 RX ORDER — INSULIN LISPRO 100/ML
VIAL (ML) SUBCUTANEOUS AT BEDTIME
Refills: 0 | Status: DISCONTINUED | OUTPATIENT
Start: 2022-10-05 | End: 2022-10-08

## 2022-10-05 RX ORDER — SODIUM CHLORIDE 9 MG/ML
1750 INJECTION INTRAMUSCULAR; INTRAVENOUS; SUBCUTANEOUS ONCE
Refills: 0 | Status: COMPLETED | OUTPATIENT
Start: 2022-10-05 | End: 2022-10-05

## 2022-10-05 RX ORDER — TIOTROPIUM BROMIDE 18 UG/1
1 CAPSULE ORAL; RESPIRATORY (INHALATION) DAILY
Refills: 0 | Status: DISCONTINUED | OUTPATIENT
Start: 2022-10-05 | End: 2022-10-15

## 2022-10-05 RX ORDER — SIMVASTATIN 20 MG/1
1 TABLET, FILM COATED ORAL
Qty: 0 | Refills: 0 | DISCHARGE

## 2022-10-05 RX ORDER — ACETAMINOPHEN 500 MG
650 TABLET ORAL ONCE
Refills: 0 | Status: DISCONTINUED | OUTPATIENT
Start: 2022-10-05 | End: 2022-10-05

## 2022-10-05 RX ORDER — BUDESONIDE AND FORMOTEROL FUMARATE DIHYDRATE 160; 4.5 UG/1; UG/1
2 AEROSOL RESPIRATORY (INHALATION)
Refills: 0 | Status: DISCONTINUED | OUTPATIENT
Start: 2022-10-05 | End: 2022-10-15

## 2022-10-05 RX ORDER — ONDANSETRON 8 MG/1
4 TABLET, FILM COATED ORAL EVERY 8 HOURS
Refills: 0 | Status: DISCONTINUED | OUTPATIENT
Start: 2022-10-05 | End: 2022-10-15

## 2022-10-05 RX ORDER — AZITHROMYCIN 500 MG/1
500 TABLET, FILM COATED ORAL DAILY
Refills: 0 | Status: COMPLETED | OUTPATIENT
Start: 2022-10-05 | End: 2022-10-08

## 2022-10-05 RX ORDER — MONTELUKAST 4 MG/1
10 TABLET, CHEWABLE ORAL DAILY
Refills: 0 | Status: DISCONTINUED | OUTPATIENT
Start: 2022-10-05 | End: 2022-10-15

## 2022-10-05 RX ORDER — ACETAMINOPHEN 500 MG
975 TABLET ORAL ONCE
Refills: 0 | Status: COMPLETED | OUTPATIENT
Start: 2022-10-05 | End: 2022-10-05

## 2022-10-05 RX ORDER — PIPERACILLIN AND TAZOBACTAM 4; .5 G/20ML; G/20ML
3.38 INJECTION, POWDER, LYOPHILIZED, FOR SOLUTION INTRAVENOUS ONCE
Refills: 0 | Status: COMPLETED | OUTPATIENT
Start: 2022-10-05 | End: 2022-10-05

## 2022-10-05 RX ORDER — ESOMEPRAZOLE MAGNESIUM 40 MG/1
15 CAPSULE, DELAYED RELEASE ORAL
Qty: 0 | Refills: 0 | DISCHARGE

## 2022-10-05 RX ORDER — VANCOMYCIN HCL 1 G
1250 VIAL (EA) INTRAVENOUS ONCE
Refills: 0 | Status: COMPLETED | OUTPATIENT
Start: 2022-10-05 | End: 2022-10-05

## 2022-10-05 RX ORDER — SIMVASTATIN 20 MG/1
10 TABLET, FILM COATED ORAL AT BEDTIME
Refills: 0 | Status: DISCONTINUED | OUTPATIENT
Start: 2022-10-05 | End: 2022-10-15

## 2022-10-05 RX ORDER — DEXTROSE 50 % IN WATER 50 %
12.5 SYRINGE (ML) INTRAVENOUS ONCE
Refills: 0 | Status: DISCONTINUED | OUTPATIENT
Start: 2022-10-05 | End: 2022-10-15

## 2022-10-05 RX ORDER — LANOLIN ALCOHOL/MO/W.PET/CERES
3 CREAM (GRAM) TOPICAL AT BEDTIME
Refills: 0 | Status: DISCONTINUED | OUTPATIENT
Start: 2022-10-05 | End: 2022-10-09

## 2022-10-05 RX ORDER — CEFTRIAXONE 500 MG/1
1000 INJECTION, POWDER, FOR SOLUTION INTRAMUSCULAR; INTRAVENOUS EVERY 24 HOURS
Refills: 0 | Status: COMPLETED | OUTPATIENT
Start: 2022-10-05 | End: 2022-10-09

## 2022-10-05 RX ORDER — PANTOPRAZOLE SODIUM 20 MG/1
40 TABLET, DELAYED RELEASE ORAL
Refills: 0 | Status: DISCONTINUED | OUTPATIENT
Start: 2022-10-05 | End: 2022-10-08

## 2022-10-05 RX ORDER — WARFARIN SODIUM 2.5 MG/1
3 TABLET ORAL ONCE
Refills: 0 | Status: DISCONTINUED | OUTPATIENT
Start: 2022-10-05 | End: 2022-10-05

## 2022-10-05 RX ORDER — GABAPENTIN 400 MG/1
400 CAPSULE ORAL
Refills: 0 | Status: DISCONTINUED | OUTPATIENT
Start: 2022-10-05 | End: 2022-10-15

## 2022-10-05 RX ORDER — CHOLECALCIFEROL (VITAMIN D3) 125 MCG
1000 CAPSULE ORAL
Qty: 0 | Refills: 0 | DISCHARGE

## 2022-10-05 RX ADMIN — TRAMADOL HYDROCHLORIDE 50 MILLIGRAM(S): 50 TABLET ORAL at 19:05

## 2022-10-05 RX ADMIN — SIMVASTATIN 10 MILLIGRAM(S): 20 TABLET, FILM COATED ORAL at 23:31

## 2022-10-05 RX ADMIN — Medication 30 MILLIGRAM(S): at 19:05

## 2022-10-05 RX ADMIN — WARFARIN SODIUM 4 MILLIGRAM(S): 2.5 TABLET ORAL at 23:31

## 2022-10-05 RX ADMIN — GABAPENTIN 400 MILLIGRAM(S): 400 CAPSULE ORAL at 19:06

## 2022-10-05 RX ADMIN — PIPERACILLIN AND TAZOBACTAM 200 GRAM(S): 4; .5 INJECTION, POWDER, LYOPHILIZED, FOR SOLUTION INTRAVENOUS at 14:06

## 2022-10-05 RX ADMIN — Medication 166.67 MILLIGRAM(S): at 15:56

## 2022-10-05 RX ADMIN — PIPERACILLIN AND TAZOBACTAM 3.38 GRAM(S): 4; .5 INJECTION, POWDER, LYOPHILIZED, FOR SOLUTION INTRAVENOUS at 15:32

## 2022-10-05 RX ADMIN — SODIUM CHLORIDE 1750 MILLILITER(S): 9 INJECTION INTRAMUSCULAR; INTRAVENOUS; SUBCUTANEOUS at 13:40

## 2022-10-05 RX ADMIN — Medication 975 MILLIGRAM(S): at 15:32

## 2022-10-05 RX ADMIN — Medication 975 MILLIGRAM(S): at 14:06

## 2022-10-05 RX ADMIN — SODIUM CHLORIDE 1750 MILLILITER(S): 9 INJECTION INTRAMUSCULAR; INTRAVENOUS; SUBCUTANEOUS at 15:32

## 2022-10-05 NOTE — H&P ADULT - ASSESSMENT
81-year-old obese female past medical history previously diagnosied with DMII (no longer current), HTN, GERD, COPD not on home O2, pAfib s/p ablation currently on Coumadin, Aplastic Anemia, CKD, HFpEF admitted for pneumonia workup

## 2022-10-05 NOTE — ED ADULT NURSE NOTE - DOES PATIENT HAVE ADVANCE DIRECTIVE
No

## 2022-10-05 NOTE — H&P ADULT - PROBLEM SELECTOR PLAN 1
Pt with new onset weakness and fever (103.8 rectal)  -Pt reports no LOC  -s/p 1x vanc + zosyn in ED  -CXR: 1. Ill-defined increased density in the right lower lobe may be due to the overlying breast shadow however follow-up is recommended to help exclude an evolving right lower lobe infiltrate in this region. 2. No other significant findings.   -f/u Chest CT  -heme consulted, f/u recs  -ID consulted, f/u recs  -Pulm El consulted, f/u recs Pt with new onset weakness and fever (103.8 rectal)  -Pt reports no LOC  -s/p 1x vanc + zosyn in ED  -CXR: 1. Ill-defined increased density in the right lower lobe may be due to the overlying breast shadow however follow-up is recommended to help exclude an evolving right lower lobe infiltrate in this region. 2. No other significant findings.   -f/u Chest CT  -heme consulted, f/u recs  -ID consulted, f/u recs  -Pulm El consulted, f/u recs  -Pt with history of with history of Pulmonary emboli - S/P IVC filter - followed by GI bleed, ischemic colitis, shock, and prolonged respiratory failure Weakness due to community acquired pnemonia.  -does not meet sepsis criteria  -confusion resolved after her fever resolved.   -Ceftraixone as ordered  -CXR: 1. Ill-defined increased density in the right lower lobe may be due to the overlying breast shadow however follow-up is recommended to help exclude an evolving right lower lobe infiltrate in this region. 2. No other significant findings.   -f/u Chest CT  -heme consulted, f/u recs  -ID consulted, f/u recs  -Pulm El consulted, f/u recs  -Pt with history of with history of Pulmonary emboli - S/P IVC filter - followed by GI bleed, ischemic colitis, shock, and prolonged respiratory failure

## 2022-10-05 NOTE — PATIENT PROFILE ADULT - FALL HARM RISK - RISK INTERVENTIONS

## 2022-10-05 NOTE — H&P ADULT - PROBLEM SELECTOR PLAN 3
ProAir HFA is the covered medication for this patient. Please contact the pharmacy, and let them know they can dispense ProAir.    Pt follows Dr. Jun Oneil for chronic pain management  -Tramadol 50mg bid  -Gabapentin 400mg bid (given patients current GFR, max dose is 900mg per day)

## 2022-10-05 NOTE — ED PROVIDER NOTE - OBJECTIVE STATEMENT
81-year-old obese female past medical history diabetes hypertension hiatal hernia COPD former smoke paroxysmal A. fib status post ablation pericarditis presents to the ER with complaints of generalized weakness this morning.  Patient presents with daughter who states that yesterday patient was at her baseline and cooked a large meal for a family gathering for the holiday.  This morning she was on the toilet and unable to get up due to weakness.  Patient was found to be febrile 103 at home.  Daughter reports patient slightly more confused than usual which she tends to get when she has fevers.  Patient denies nausea vomiting diarrhea hematuria dysuria.  Patient denies chest pain shortness of breath and worsening cough, although she does have a baseline productive cough due to her underlying COPD.  Denies any sick contacts and no one else in the home is having the same symptoms.    PCP Dr Rodas

## 2022-10-05 NOTE — ED ADULT NURSE NOTE - OBJECTIVE STATEMENT
Patient is 80yo F BIB EMS with daughter with c/o generalized weakness today. Daughter reports patient was in her usual state of health last night, cooked dinner for her whole family for the holiday, then this morning was so weak she was unable to get herself off the toilet. Patient reports general weakness, denies CP, SOB, HA, dizziness, fevers, chills, back pain, abdominal pain, N/V/D. Patient febrile to 103.8F rectally upon arrival and tachypneic. Daughter reports patient has h/o anemia, last blood transfusion in August of this year.

## 2022-10-05 NOTE — H&P ADULT - NSHPPHYSICALEXAM_GEN_ALL_CORE
T(C): 38.2 (10-05-22 @ 15:50), Max: 39.9 (10-05-22 @ 13:19)  HR: 77 (10-05-22 @ 15:50) (77 - 81)  BP: 109/50 (10-05-22 @ 15:50) (109/50 - 129/74)  RR: 17 (10-05-22 @ 15:50) (16 - 17)  SpO2: 96% (10-05-22 @ 15:50) (96% - 96%)    GENERAL: patient appears well, no acute distress, appropriate, pleasant  EYES: sclera clear, no exudates  ENMT: oropharynx clear without erythema, no exudates, moist mucous membranes  NECK: supple, soft, no thyromegaly noted  LUNGS: diffuse bilateral expiratory wheezing, mild basilar crackles  HEART: soft S1/S2, regular rate and rhythm, no murmurs noted, no lower extremity edema  GASTROINTESTINAL: obese, abdomen is soft, nontender, nondistended, normoactive bowel sounds, no palpable masses  INTEGUMENT: good skin turgor, no lesions noted  MUSCULOSKELETAL: no clubbing or cyanosis, no obvious deformity  NEUROLOGIC: awake, alert, oriented x3, good muscle tone in 4 extremities, no obvious sensory deficits  PSYCHIATRIC: mood is good, affect is congruent, linear and logical thought process  HEME/LYMPH: no palpable supraclavicular nodules, no obvious ecchymosis or petechiae

## 2022-10-05 NOTE — ED PROVIDER NOTE - PHYSICAL EXAMINATION
PE:   GEN: Awake, alert, interactive, NAD, non-toxic appearing.   HEAD: Atraumatic  EYES: Sclera white, conjunctiva pink, PERRLA  NOSE: Patent, no epistaxis  MOUTH/THROAT: Patent, uvula midline, no tonsillar edema or erythema, no acute dental findings, no oral lesions or ulcerations, no Hoarse voice  CARDIAC: tachycardia, S1,S2, no murmur/rub/gallop. Strong central and peripheral pulses, Brisk cap refill, no evident pedal edema.   RESP: tachypnea, +productive cough with tan/ white mucus, course lung sounds at R base otherwise clear   ABD: soft, supple, non-tender, no guarding. BS x 4, normoactive.   NEURO: AOx3, CN II-XII grossly intact without focal deficit.   MSK: Moving all extremities with no apparent deformities.   SKIN: Warm, dry, normal color, without apparent rashes.

## 2022-10-05 NOTE — H&P ADULT - PROBLEM SELECTOR PLAN 5
Pt with history of pAfib s/p ablation on Coumadin last dose __________  -EKG:  -INR on admission 2.22  -daily INR checks  -Dose Coumadin in hospital daily  -Home regiment: Coumadin 4mg Tuesday/Wednesday/Thursday/Sunday + Coumadin 6mg Monday/Friday/Saturday  -amiodarone 100mg qd Pt with history of pAfib s/p ablation on Coumadin   -INR on admission 2.22  -daily INR checks  -Dose Coumadin in hospital daily. Give 4mg tonight  -Home regiment: Coumadin 4mg Tuesday/Wednesday/Thursday/Sunday + Coumadin 6mg Monday/Friday/Saturday  -amiodarone 100mg qd

## 2022-10-05 NOTE — H&P ADULT - PROBLEM SELECTOR PLAN 2
Pt with CKD (G3a), baseline GFR ~42, Cr 1.2-1.3 at baseline per chart review  -Cr 1.7 on admission, likely 2/2 dehydration and poor PO intake given acute illness  - IVF at 75cc/hr for 12 hours  - Monitor BMP  - Avoid nephrotoxic medications  - Monitor renal indices  - Consider nephrology consult for worsening renal function Pt with CKD (G3a), baseline GFR ~42, Cr 1.2-1.3 at baseline per chart review  -Cr 1.7 on admission, likely 2/2 dehydration and poor PO intake given acute illness  -s/p 1.75L NS in ED  -no further IVF  - Monitor BMP  - Avoid nephrotoxic medications  - Monitor renal indices  - Consider nephrology consult for worsening renal function

## 2022-10-05 NOTE — H&P ADULT - NSICDXPASTMEDICALHX_GEN_ALL_CORE_FT
PAST MEDICAL HISTORY:  COPD (chronic obstructive pulmonary disease)     DM (diabetes mellitus)     GIB (gastrointestinal bleeding)     Hiatal hernia     PAF (paroxysmal atrial fibrillation) s/p ablation

## 2022-10-05 NOTE — H&P ADULT - PROBLEM SELECTOR PLAN 6
Pt with history of aplastic anemia, follows Dr. Parker outpatient  -heme consulted, f/u recs  -Procrit 10 injection weekly with heme, last dose __________  -folic acid 1mg qd  -monitor blood counts daily  -transfuse if Hg <7.0  -monitor routine hemodynamics Pt with history of aplastic anemia, follows Dr. Parker outpatient  -heme consulted, f/u recs  -Procrit 10 injection weekly with heme  -folic acid 1mg qd  -monitor blood counts daily  -transfuse if Hg <7.0  -monitor routine hemodynamics

## 2022-10-05 NOTE — H&P ADULT - NSHPREVIEWOFSYSTEMS_GEN_ALL_CORE
CONSTITUTIONAL: denies fever, chills, fatigue, weakness  HEENT: denies blurred vision, sore throat  SKIN: denies new lesions, rash  CARDIOVASCULAR: denies chest pain, chest pressure, palpitations  RESPIRATORY: +Chronic cough and yellow sputum production, denies shortness of breath,  GASTROINTESTINAL: denies nausea, vomiting, diarrhea, abdominal pain  GENITOURINARY: denies dysuria, discharge  NEUROLOGICAL: denies numbness, headache, focal weakness  MUSCULOSKELETAL: denies new joint pain, muscle aches  HEMATOLOGIC: denies gross bleeding, bruising  LYMPHATICS: denies enlarged lymph nodes, extremity swelling  PSYCHIATRIC: denies recent changes in anxiety, depression CONSTITUTIONAL: admits generalized weakness, admits fevers  HEENT: denies blurred vision, sore throat  SKIN: denies new lesions, rash  CARDIOVASCULAR: denies chest pain, chest pressure, palpitations  RESPIRATORY: +Chronic cough and yellow sputum production, denies shortness of breath,  GASTROINTESTINAL: denies nausea, vomiting, diarrhea, abdominal pain, melena, hematochezia  GENITOURINARY: denies dysuria, discharge  NEUROLOGICAL: denies numbness, headache, focal weakness  MUSCULOSKELETAL: denies new joint pain, muscle aches  HEMATOLOGIC: denies gross bleeding, bruising  LYMPHATICS: denies enlarged lymph nodes, extremity swelling  PSYCHIATRIC: denies recent changes in anxiety, depression

## 2022-10-05 NOTE — H&P ADULT - PROBLEM SELECTOR PLAN 10
Pt with previous diagnosis of DM2  -Last A1c (4/22) 5.5  -No need for insulin regiment in hospital at this time  -f/u AM A1c Pt with previous diagnosis of DM2  -Last A1c (4/22) 5.5  -correctional insulin as ordered  -f/u AM A1c

## 2022-10-05 NOTE — H&P ADULT - PROBLEM SELECTOR PLAN 4
Pt with history of COPD  -Stable  - Continue home medications  -Trelegy Ellipta therapeutic interchange in hospital  -Singulair 10 qd  - Encourage incentive spirometry  - Monitor respiratory status   - ABG/VBG as needed  - F/u RVP  -no home Oxygen use  -pulm (El) consulted, f/u recs Pt with history of COPD  -Stable  - Continue home medications  -Trelegy Ellipta therapeutic interchange in hospital  -Singulair 10 mg qd  - Encourage incentive spirometry  - Monitor respiratory status   - F/u RVP  -no home Oxygen use  -pulm (El) consulted, f/u recs

## 2022-10-05 NOTE — H&P ADULT - ATTENDING COMMENTS
81-year-old obese female past medical history previously diagnosied with DMII (no longer current), HTN, GERD, COPD not on home O2, pAfib s/p ablation currently on Coumadin, Aplastic Anemia, CKD, HFpEF admitted for pneumonia workup    HPI as above.     T(C): 38.2 (10-05-22 @ 15:50), Max: 39.9 (10-05-22 @ 13:19)  HR: 77 (10-05-22 @ 15:50) (77 - 81)  BP: 109/50 (10-05-22 @ 15:50) (109/50 - 129/74)  RR: 17 (10-05-22 @ 15:50) (16 - 17)  SpO2: 96% (10-05-22 @ 15:50) (96% - 96%)  Wt(kg): --    Physical Exam:   GENERAL: well-groomed, well-developed, NAD  HEENT: head NC/AT; EOM intact, PERRLA, conjunctiva & sclera clear; hearing grossly intact, moist mucous membranes  NECK: supple, no JVD  RESPIRATORY: rhonchi b/l lung bases, no rales   CARDIOVASCULAR: S1&S2, RRR, no murmurs or gallops  ABDOMEN: soft, non-tender, non-distended, + Bowel sounds x4 quadrants, no guarding, rebound or rigidity  MUSCULOSKELETAL:  trace LE pitting edema  LYMPH: no cervical lymphadenopathy  VASCULAR: Radial pulses 2+ bilaterally, no varicose veins   SKIN: warm and dry, color normal  NEUROLOGIC: AA&O X3, CN2-12 intact w/ no focal deficits, no sensory loss, motor Strength 5/5 in UE & LE B/L  Psych: Normal mood and affect, normal behavior    Plan:   Community acquired pneumonia: does not meet sepsis criteria  -continue rocephin and azithro  -f/u legionella and strep Ag from urine  -f/u CT chest and repeat lactic acid    Aplastic anemia: no signs or symptoms of active bleeding. Continue to monitor hgb.   -trend Hgb    GERD: continue protonix    HTN: BP stable and at goal    A fib: continue coumadin will give 4mg tonight  -check INR in AM    KIMBERLY: s/p IVF  -repeat Cr in AM    DVT ppx: on coumadin

## 2022-10-05 NOTE — H&P ADULT - HISTORY OF PRESENT ILLNESS
81-year-old obese female past medical history previously diagnosied with DMII (no longer current), HTN, COPD not on home O2, pAfib s/p ablation currently on Coumadin, Aplastic Anemia, CKD, HFpEF presents to the ER with complaints of generalized weakness this morning. Patient presents with daughter who states that yesterday patient was at her baseline and cooked a large meal for a family gathering for the holiday. This morning she was on the toilet and unable to get up due to weakness. Patient was found to be febrile 103 at home. Daughter reports patient slightly more confused than usual which she tends to get when she has fevers. Patient denies nausea vomiting diarrhea hematuria dysuria. Patient denies chest pain shortness of breath and worsening cough, although she does have a baseline productive cough due to her underlying COPD. Denies any sick contacts and no one else in the home is having the same symptoms.    ED Course  Vitals: T:103.8, HR 77, /50, RR 17, O2 96 on RA  Labs: Hg 9.1, INR 2.22, Cr 1.7, Lactate 2.2, UA clear,   Imaging: CXR: 1. Ill-defined increased density in the right lower lobe may be due to the overlying breast shadow however follow-up is recommended to help exclude an evolving right lower lobe infiltrate in this region. 2. No other significant findings.   Received: Vancomycin 1250mg x1, APAP 975mg x1, Zosyn 3.375g x1  EKG: not in chart   81-year-old obese female past medical history previously diagnosied with DMII (no longer current), HTN, GERD, COPD not on home O2, pAfib s/p ablation currently on Coumadin, Aplastic Anemia, CKD, presents to the ER with complaints of generalized weakness this morning. Patient presents with daughter who states that yesterday patient was at her baseline and cooked a large meal for a family gathering for the holiday. This morning she was on the toilet and unable to get up due to weakness. Patient was found to be febrile 103 at home. Daughter reports patient slightly more confused than usual which she tends to get when she has fevers. Patient denies nausea vomiting diarrhea hematuria dysuria. Patient denies chest pain shortness of breath and worsening cough, although she does have a baseline productive cough due to her underlying COPD. Denies any sick contacts and no one else in the home is having the same symptoms.    ED Course  Vitals: T:103.8, HR 77, /50, RR 17, O2 96 on RA  Labs: Hg 9.1, INR 2.22, Cr 1.7, Lactate 2.2, UA clear,   Imaging: CXR: 1. Ill-defined increased density in the right lower lobe may be due to the overlying breast shadow however follow-up is recommended to help exclude an evolving right lower lobe infiltrate in this region. 2. No other significant findings.   Received: Vancomycin 1250mg x1, APAP 975mg x1, Zosyn 3.375g x1  EKG: not in chart   81-year-old obese female past medical history previously diagnosied with DMII (no longer current), HTN, GERD, COPD not on home O2 (with history of Pulmonary emboli - S/P IVC filter - followed by GI bleed, ischemic colitis, shock, and prolonged respiratory failure), pAfib s/p ablation currently on Coumadin, Aplastic Anemia, CKD, presents to the ER with complaints of generalized weakness this morning. Patient presents with daughter who states that yesterday patient was at her baseline and cooked a large meal for a family gathering for the holiday. This morning she was on the toilet and unable to get up due to weakness. Patient was found to be febrile 103 at home. Daughter reports patient slightly more confused than usual which she tends to get when she has fevers. Patient denies nausea vomiting diarrhea hematuria dysuria. Patient denies chest pain shortness of breath and worsening cough, although she does have a baseline productive cough due to her underlying COPD. Denies any sick contacts and no one else in the home is having the same symptoms.    of note: patient has recent hospital stay for superimposed bacterial pneumonia early 2022.    ED Course  Vitals: T:103.8, HR 77, /50, RR 17, O2 96 on RA  Labs: Hg 9.1, INR 2.22, Cr 1.7, Lactate 2.2, UA clear,   Imaging: CXR: 1. Ill-defined increased density in the right lower lobe may be due to the overlying breast shadow however follow-up is recommended to help exclude an evolving right lower lobe infiltrate in this region. 2. No other significant findings.   Received: Vancomycin 1250mg x1, APAP 975mg x1, Zosyn 3.375g x1  EKG: not in chart   81-year-old obese female past medical history previously diagnosied with DMII (no longer current), HTN, GERD, COPD not on home O2 (with history of Pulmonary emboli - S/P IVC filter - followed by GI bleed, ischemic colitis, shock, and prolonged respiratory failure), pAfib s/p ablation currently on Coumadin, Aplastic Anemia, CKD, presents to the ER with complaints of generalized weakness this morning. Patient presents with daughter who states that yesterday patient was at her baseline and cooked a large meal for a family gathering for the holiday. This morning she was on the toilet and unable to get up due to weakness. Patient was found to be febrile 103 at home. Daughter reports patient slightly more confused than usual which she tends to get when she has fevers. Patient denies nausea vomiting diarrhea hematuria dysuria. Patient denies chest pain shortness of breath and worsening cough, although she does have a baseline productive cough due to her underlying COPD. Denies any sick contacts and no one else in the home is having the same symptoms.  Patient is back at baseline mental status, A and O x3. able to answer questions appropriately    of note: patient has recent hospital stay for superimposed bacterial pneumonia early 2022.    ED Course  Vitals: T:103.8, HR 77, /50, RR 17, O2 96 on RA  Labs: Hg 9.1, INR 2.22, Cr 1.7, Lactate 2.2, UA clear,   Imaging: CXR: 1. Ill-defined increased density in the right lower lobe may be due to the overlying breast shadow however follow-up is recommended to help exclude an evolving right lower lobe infiltrate in this region. 2. No other significant findings.   Received: Vancomycin 1250mg x1, APAP 975mg x1, Zosyn 3.375g x1  EKG NSR without ischemic changes

## 2022-10-05 NOTE — ED PROVIDER NOTE - CROS ED GI ALL NEG
The left coronary artery was selectively engaged and injected. A Catheter Guide X Bkup 3.5 Crv 6fr .07in 100cm was used. Multiple views of the injected vessel were taken. negative...

## 2022-10-05 NOTE — ED PROVIDER NOTE - CLINICAL SUMMARY MEDICAL DECISION MAKING FREE TEXT BOX
Emma: Patient presents with daughter who states that yesterday patient was at her baseline and cooked a large meal for a family gathering for the holiday.  This morning she was on the toilet and unable to get up due to weakness.  Patient was found to be febrile 103 at home.  Daughter reports patient slightly more confused than usual which she tends to get when she has fevers.

## 2022-10-05 NOTE — H&P ADULT - PROBLEM SELECTOR PLAN 7
Pt /50 on admission  -cardizem 30mg bid with hold parameters  -simvastatin 10mg qd  - Monitor routine hemodynamics

## 2022-10-06 DIAGNOSIS — N18.9 CHRONIC KIDNEY DISEASE, UNSPECIFIED: ICD-10-CM

## 2022-10-06 DIAGNOSIS — D46.Z OTHER MYELODYSPLASTIC SYNDROMES: ICD-10-CM

## 2022-10-06 LAB
ALBUMIN SERPL ELPH-MCNC: 2.9 G/DL — LOW (ref 3.3–5)
ALP SERPL-CCNC: 60 U/L — SIGNIFICANT CHANGE UP (ref 40–120)
ALT FLD-CCNC: 20 U/L — SIGNIFICANT CHANGE UP (ref 12–78)
ANION GAP SERPL CALC-SCNC: 7 MMOL/L — SIGNIFICANT CHANGE UP (ref 5–17)
AST SERPL-CCNC: 13 U/L — LOW (ref 15–37)
BASOPHILS # BLD AUTO: 0.02 K/UL — SIGNIFICANT CHANGE UP (ref 0–0.2)
BASOPHILS NFR BLD AUTO: 0.1 % — SIGNIFICANT CHANGE UP (ref 0–2)
BILIRUB SERPL-MCNC: 1.2 MG/DL — SIGNIFICANT CHANGE UP (ref 0.2–1.2)
BUN SERPL-MCNC: 32 MG/DL — HIGH (ref 7–23)
CALCIUM SERPL-MCNC: 9 MG/DL — SIGNIFICANT CHANGE UP (ref 8.5–10.1)
CHLORIDE SERPL-SCNC: 105 MMOL/L — SIGNIFICANT CHANGE UP (ref 96–108)
CO2 SERPL-SCNC: 26 MMOL/L — SIGNIFICANT CHANGE UP (ref 22–31)
CREAT SERPL-MCNC: 1.4 MG/DL — HIGH (ref 0.5–1.3)
CULTURE RESULTS: SIGNIFICANT CHANGE UP
EGFR: 38 ML/MIN/1.73M2 — LOW
EOSINOPHIL # BLD AUTO: 0 K/UL — SIGNIFICANT CHANGE UP (ref 0–0.5)
EOSINOPHIL NFR BLD AUTO: 0 % — SIGNIFICANT CHANGE UP (ref 0–6)
GLUCOSE SERPL-MCNC: 84 MG/DL — SIGNIFICANT CHANGE UP (ref 70–99)
HCT VFR BLD CALC: 24.4 % — LOW (ref 34.5–45)
HGB BLD-MCNC: 7.6 G/DL — LOW (ref 11.5–15.5)
IMM GRANULOCYTES NFR BLD AUTO: 2.8 % — HIGH (ref 0–0.9)
INR BLD: 2.41 RATIO — HIGH (ref 0.88–1.16)
LYMPHOCYTES # BLD AUTO: 1.56 K/UL — SIGNIFICANT CHANGE UP (ref 1–3.3)
LYMPHOCYTES # BLD AUTO: 9.9 % — LOW (ref 13–44)
MCHC RBC-ENTMCNC: 31.1 GM/DL — LOW (ref 32–36)
MCHC RBC-ENTMCNC: 31.9 PG — SIGNIFICANT CHANGE UP (ref 27–34)
MCV RBC AUTO: 102.5 FL — HIGH (ref 80–100)
MONOCYTES # BLD AUTO: 1.24 K/UL — HIGH (ref 0–0.9)
MONOCYTES NFR BLD AUTO: 7.8 % — SIGNIFICANT CHANGE UP (ref 2–14)
NEUTROPHILS # BLD AUTO: 12.54 K/UL — HIGH (ref 1.8–7.4)
NEUTROPHILS NFR BLD AUTO: 79.4 % — HIGH (ref 43–77)
NRBC # BLD: 0 /100 WBCS — SIGNIFICANT CHANGE UP (ref 0–0)
PLATELET # BLD AUTO: 196 K/UL — SIGNIFICANT CHANGE UP (ref 150–400)
POTASSIUM SERPL-MCNC: 4.3 MMOL/L — SIGNIFICANT CHANGE UP (ref 3.5–5.3)
POTASSIUM SERPL-SCNC: 4.3 MMOL/L — SIGNIFICANT CHANGE UP (ref 3.5–5.3)
PROCALCITONIN SERPL-MCNC: 1.04 NG/ML — HIGH
PROT SERPL-MCNC: 6.3 G/DL — SIGNIFICANT CHANGE UP (ref 6–8.3)
PROTHROM AB SERPL-ACNC: 28.5 SEC — HIGH (ref 10.5–13.4)
RBC # BLD: 2.38 M/UL — LOW (ref 3.8–5.2)
RBC # FLD: 23.9 % — HIGH (ref 10.3–14.5)
SODIUM SERPL-SCNC: 138 MMOL/L — SIGNIFICANT CHANGE UP (ref 135–145)
SPECIMEN SOURCE: SIGNIFICANT CHANGE UP
WBC # BLD: 15.81 K/UL — HIGH (ref 3.8–10.5)
WBC # FLD AUTO: 15.81 K/UL — HIGH (ref 3.8–10.5)

## 2022-10-06 PROCEDURE — 99223 1ST HOSP IP/OBS HIGH 75: CPT

## 2022-10-06 PROCEDURE — 71045 X-RAY EXAM CHEST 1 VIEW: CPT | Mod: 26

## 2022-10-06 PROCEDURE — 99222 1ST HOSP IP/OBS MODERATE 55: CPT

## 2022-10-06 PROCEDURE — 99233 SBSQ HOSP IP/OBS HIGH 50: CPT

## 2022-10-06 PROCEDURE — 71250 CT THORAX DX C-: CPT | Mod: 26

## 2022-10-06 RX ORDER — WARFARIN SODIUM 2.5 MG/1
4 TABLET ORAL ONCE
Refills: 0 | Status: COMPLETED | OUTPATIENT
Start: 2022-10-06 | End: 2022-10-06

## 2022-10-06 RX ADMIN — Medication 1 MILLIGRAM(S): at 11:18

## 2022-10-06 RX ADMIN — Medication 30 MILLIGRAM(S): at 18:52

## 2022-10-06 RX ADMIN — TRAMADOL HYDROCHLORIDE 50 MILLIGRAM(S): 50 TABLET ORAL at 20:43

## 2022-10-06 RX ADMIN — TRAMADOL HYDROCHLORIDE 50 MILLIGRAM(S): 50 TABLET ORAL at 05:53

## 2022-10-06 RX ADMIN — PANTOPRAZOLE SODIUM 40 MILLIGRAM(S): 20 TABLET, DELAYED RELEASE ORAL at 11:17

## 2022-10-06 RX ADMIN — TRAMADOL HYDROCHLORIDE 50 MILLIGRAM(S): 50 TABLET ORAL at 21:43

## 2022-10-06 RX ADMIN — AZITHROMYCIN 500 MILLIGRAM(S): 500 TABLET, FILM COATED ORAL at 11:17

## 2022-10-06 RX ADMIN — TRAMADOL HYDROCHLORIDE 50 MILLIGRAM(S): 50 TABLET ORAL at 05:26

## 2022-10-06 RX ADMIN — SIMVASTATIN 10 MILLIGRAM(S): 20 TABLET, FILM COATED ORAL at 22:02

## 2022-10-06 RX ADMIN — GABAPENTIN 400 MILLIGRAM(S): 400 CAPSULE ORAL at 18:51

## 2022-10-06 RX ADMIN — MONTELUKAST 10 MILLIGRAM(S): 4 TABLET, CHEWABLE ORAL at 11:20

## 2022-10-06 RX ADMIN — BUDESONIDE AND FORMOTEROL FUMARATE DIHYDRATE 2 PUFF(S): 160; 4.5 AEROSOL RESPIRATORY (INHALATION) at 11:18

## 2022-10-06 RX ADMIN — GABAPENTIN 400 MILLIGRAM(S): 400 CAPSULE ORAL at 05:25

## 2022-10-06 RX ADMIN — Medication 650 MILLIGRAM(S): at 22:00

## 2022-10-06 RX ADMIN — Medication 30 MILLIGRAM(S): at 05:26

## 2022-10-06 RX ADMIN — BUDESONIDE AND FORMOTEROL FUMARATE DIHYDRATE 2 PUFF(S): 160; 4.5 AEROSOL RESPIRATORY (INHALATION) at 19:45

## 2022-10-06 RX ADMIN — WARFARIN SODIUM 4 MILLIGRAM(S): 2.5 TABLET ORAL at 22:02

## 2022-10-06 RX ADMIN — CEFTRIAXONE 100 MILLIGRAM(S): 500 INJECTION, POWDER, FOR SOLUTION INTRAMUSCULAR; INTRAVENOUS at 18:51

## 2022-10-06 RX ADMIN — AMIODARONE HYDROCHLORIDE 100 MILLIGRAM(S): 400 TABLET ORAL at 05:26

## 2022-10-06 RX ADMIN — TIOTROPIUM BROMIDE 1 CAPSULE(S): 18 CAPSULE ORAL; RESPIRATORY (INHALATION) at 11:18

## 2022-10-06 RX ADMIN — Medication 650 MILLIGRAM(S): at 20:43

## 2022-10-06 NOTE — PROGRESS NOTE ADULT - PROBLEM SELECTOR PLAN 1
Weakness due to community acquired pnemonia.  -confusion resolved after her fever resolved.   -C/W Ceftraixone   -CXR: 1. Ill-defined increased density in the right lower lobe may be due to the overlying breast shadow however follow-up is recommended to help exclude an evolving right lower lobe infiltrate in this region. 2. No other significant findings.   -f/u Chest CT  -heme consulted, f/u recs  -ID consulted, f/u recs  -Pulm El consulted, f/u recs  -Pt with history of with history of Pulmonary emboli - S/P IVC filter - followed by GI bleed, ischemic colitis, shock, and prolonged respiratory failure Weakness due to community acquired pnemonia.  -confusion resolved after her fever resolved.   -C/W Ceftraixone   -CXR: 1. Ill-defined increased density in the right lower lobe may be due to the overlying breast shadow however follow-up is recommended to help exclude an evolving right lower lobe infiltrate in this region. 2. No other significant findings.   -f/u Chest CT done this am  -heme consulted, f/u recs  -ID consulted, f/u recs  -Pulm El consulted, f/u recs  -Pt with history of with history of Pulmonary emboli - S/P IVC filter

## 2022-10-06 NOTE — CONSULT NOTE ADULT - ATTENDING COMMENTS
paf ablated with recurrence  on amio and dilt, and in sr  cont warfarin goal inr 2-3  supportive care with abx for pna  no ischemia or vol ol

## 2022-10-06 NOTE — PROGRESS NOTE ADULT - PROBLEM SELECTOR PLAN 5
Pt with history of pAfib s/p ablation on Coumadin   -INR on admission 2.4  -daily INR checks  -Dose Coumadin in hospital daily. Give 4mg tonight  -Home regiment: Coumadin 4mg Tuesday/Wednesday/Thursday/Sunday + Coumadin 6mg Monday/Friday/Saturday  -amiodarone 100mg qd

## 2022-10-06 NOTE — CONSULT NOTE ADULT - SUBJECTIVE AND OBJECTIVE BOX
[facility]  INFECTIOUS DISEASES   [address] [place] [address]  Tel: [phone]     Fax: [phone]  ======================================================  MD Emma Archer Kaushal, MD Cho, Michelle, MD   ======================================================    Assessment/Recommendations     81-year-old  female with past medical history of chronic bronchitis, pulmonary embolism s/p IVC filter, paroxysmal A. fib with chronic Coumadin, IgG deficiency, CKD, dyslipidemia presented with confusion and generalized weakness being admitted for acute metabolic encephalopathy, acute respiratory distress with right lower lobe community-acquired pneumonia (with history of pneumonia/parainfluenza in 2022)     blood culture x2: : Pending  Urine culture: : Pending  Pending urine Legionella and Streptococcus pneumonia antigen  Ordered for sputum culture and procalcitonin    rapid viral panel including SARS-CoV-2 PCR  negative on admission   CT chest noted: No previous CT chest for comparison    Agree with continuation of Rocephin  1 g IV daily with azithromycin 500 mg IV daily   daily CBC with differential, renal function with electrolytes while inpatient and on antibiotics     other comorbid condition management as per primary team/pulmonary    Records, reports from primary team, nursing, consultant reviewed  Plan explained to patient   Case discussed with Primary team   _________________________________________________-  Patient is a 81y old  Female who presents with a chief complaint of Pneumonia (06 Oct 2022 10:36)    HPI:  81-year-old obese female past medical history previously diagnosied with DMII (no longer current), HTN, GERD, COPD not on home O2 (with history of Pulmonary emboli - S/P IVC filter - followed by GI bleed, ischemic colitis, shock, and prolonged respiratory failure), pAfib s/p ablation currently on Coumadin, Aplastic Anemia, CKD, presents to the ER with complaints of generalized weakness this morning. Patient presents with daughter who states that yesterday patient was at her baseline and cooked a large meal for a family gathering for the holiday. This morning she was on the toilet and unable to get up due to weakness. Patient was found to be febrile 103 at home. Daughter reports patient slightly more confused than usual which she tends to get when she has fevers. Patient denies nausea vomiting diarrhea hematuria dysuria. Patient denies chest pain shortness of breath and worsening cough, although she does have a baseline productive cough due to her underlying COPD. Denies any sick contacts and no one else in the home is having the same symptoms.  Patient is back at baseline mental status, A and O x3. able to answer questions appropriately    of note: patient has recent hospital stay for superimposed bacterial pneumonia early .    ED Course  Vitals: T:103.8, HR 77, /50, RR 17, O2 96 on RA  Labs: Hg 9.1, INR 2.22, Cr 1.7, Lactate 2.2, UA clear,   Imaging: CXR: 1. Ill-defined increased density in the right lower lobe may be due to the overlying breast shadow however follow-up is recommended to help exclude an evolving right lower lobe infiltrate in this region. 2. No other significant findings.   Received: Vancomycin 1250mg x1, APAP 975mg x1, Zosyn 3.375g x1  EKG NSR without ischemic changes   (05 Oct 2022 16:45)    Conquering with above story patient was in complete usual state of health up until the day of admission when she had extreme weakness confusion and not able to get out of toilet with her having fever of 103, she denied any other complaints of sore throat rhinorrhea ear pain headache cough chest pain palpitation fever chills dysuria frequency nausea vomiting new rash or joint pain or recent procedure or antibiotic exposure recently    PAST MEDICAL & SURGICAL HISTORY:  COPD (chronic obstructive pulmonary disease)  DM (diabetes mellitus)  PAF (paroxysmal atrial fibrillation)  s/p ablation  Hiatal hernia  GIB (gastrointestinal bleeding)  S/P hysterectomy  S/P foot surgery  S/P knee surgery  After cataract  bilateral eye cataract surgically removed  Closed right hip fracture  rigth hip ORIF 2016  S/P IVC filter  2016  S/P carpal tunnel release  Right 2008 &amp; 17    Social History:  Previous Tobacco use 30 years ago, no alcohol use, lives at home with her  (05 Oct 2022 16:45)      FAMILY HISTORY:  Family history of bladder cancer (Mother)  Family history of myocardial infarction (Father)      Recent Ill Contacts:	none  Recent Travel History:	none  Recent Animal/Insect Exposure/Tick Bites: denied       REVIEW OF SYSTEMS  11 systems reviewed, no other symptoms except as above      Allergies    No Known Allergies      Antimicrobials:  azithromycin   Tablet 500 milliGRAM(s) Oral daily  cefTRIAXone   IVPB 1000 milliGRAM(s) IV Intermittent every 24 hours    Antibiotics Course:  azithromycin   Tablet 500 milliGRAM(s) Oral daily  cefTRIAXone   IVPB 1000 milliGRAM(s) IV Intermittent every 24 hours      Other Medications:  acetaminophen     Tablet .. 650 milliGRAM(s) Oral every 6 hours PRN  aluminum hydroxide/magnesium hydroxide/simethicone Suspension 30 milliLiter(s) Oral every 4 hours PRN  aMIOdarone    Tablet 100 milliGRAM(s) Oral daily  budesonide  80 MICROgram(s)/formoterol 4.5 MICROgram(s) Inhaler 2 Puff(s) Inhalation two times a day  dextrose 5%. 1000 milliLiter(s) IV Continuous <Continuous>  dextrose 5%. 1000 milliLiter(s) IV Continuous <Continuous>  dextrose 50% Injectable 25 Gram(s) IV Push once  dextrose 50% Injectable 12.5 Gram(s) IV Push once  dextrose 50% Injectable 25 Gram(s) IV Push once  dextrose Oral Gel 15 Gram(s) Oral once PRN  dicyclomine 10 milliGRAM(s) Oral two times a day before meals PRN  diltiazem    Tablet 30 milliGRAM(s) Oral two times a day  folic acid 1 milliGRAM(s) Oral daily  gabapentin 400 milliGRAM(s) Oral two times a day  glucagon  Injectable 1 milliGRAM(s) IntraMuscular once  insulin lispro (ADMELOG) corrective regimen sliding scale   SubCutaneous three times a day before meals  insulin lispro (ADMELOG) corrective regimen sliding scale   SubCutaneous at bedtime  melatonin 3 milliGRAM(s) Oral at bedtime PRN  montelukast 10 milliGRAM(s) Oral daily  ondansetron Injectable 4 milliGRAM(s) IV Push every 8 hours PRN  pantoprazole    Tablet 40 milliGRAM(s) Oral before breakfast  simvastatin 10 milliGRAM(s) Oral at bedtime  tiotropium 18 MICROgram(s) Capsule 1 Capsule(s) Inhalation daily  traMADol 50 milliGRAM(s) Oral two times a day        ____________________________________________  Physical Examination:    ICU Vital Signs Last 24 Hrs  T(C): 37.4 (06 Oct 2022 05:00), Max: 39.9 (05 Oct 2022 13:19)  T(F): 99.3 (06 Oct 2022 05:00), Max: 103.8 (05 Oct 2022 13:19)  HR: 80 (06 Oct 2022 05:00) (71 - 81)  BP: 124/70 (06 Oct 2022 05:00) (109/50 - 129/74)  BP(mean): --  ABP: --  ABP(mean): --  RR: 18 (06 Oct 2022 05:00) (16 - 18)  SpO2: 93% (06 Oct 2022 05:00) (91% - 96%)    O2 Parameters below as of 06 Oct 2022 05:00  Patient On (Oxygen Delivery Method): room air            General:  morbidly obese female, well kempt, No acute distress while lying in bed    Neuro: AAO*4, No obvious acute motor deficit  HEENT: Pupils equal, reactive to light, Oral mucosa moist  PULM:  decreased air entry bilaterally right more than left with no appreciable adventitious breath sounds  CVS: Irregular rhythm and controlled rate  ABD: Soft, nondistended, nontender, normoactive bowel sounds, no CVA tenderness  EXT: No b/l LE edema, nontender with pedal pulse palpable   SKIN: Warm and well perfused, no acute rashes   NO obvious palpable lymphadenopathy     _______________________________________________________    Lab Results:                        7.6    15.81 )-----------( 196      ( 06 Oct 2022 07:30 )             24.4     10-06    138  |  105  |  32<H>  ----------------------------<  84  4.3   |  26  |  1.40<H>    Ca    9.0      06 Oct 2022 07:30    TPro  6.3  /  Alb  2.9<L>  /  TBili  1.2  /  DBili  x   /  AST  13<L>  /  ALT  20  /  AlkPhos  60  10-06    LIVER FUNCTIONS - ( 06 Oct 2022 07:30 )  Alb: 2.9 g/dL / Pro: 6.3 g/dL / ALK PHOS: 60 U/L / ALT: 20 U/L / AST: 13 U/L / GGT: x           PT/INR - ( 06 Oct 2022 07:30 )   PT: 28.5 sec;   INR: 2.41 ratio         PTT - ( 05 Oct 2022 13:40 )  PTT:37.0 sec  Urinalysis Basic - ( 05 Oct 2022 15:30 )    Color: Yellow / Appearance: Clear / S.015 / pH: x  Gluc: x / Ketone: Negative  / Bili: Negative / Urobili: Negative   Blood: x / Protein: 15 / Nitrite: Negative   Leuk Esterase: Trace / RBC: 0-2 /HPF / WBC 0-2   Sq Epi: x / Non Sq Epi: Few / Bacteria: x        MICROBIOLOGY/PATHOLOGY/ RADIOLOGY: Reviewed

## 2022-10-06 NOTE — PROGRESS NOTE ADULT - PROBLEM SELECTOR PLAN 6
Pt with history of aplastic anemia, follows Dr. Parker outpatient  -heme consulted, f/u recs  -Procrit 10 injection weekly with heme  -folic acid 1mg qd  -monitor blood counts daily  -transfuse if Hg <7.0  -monitor routine hemodynamics

## 2022-10-06 NOTE — PROGRESS NOTE ADULT - PROBLEM SELECTOR PLAN 7
stable  -cardizem 30mg bid with hold parameters  -simvastatin 10mg qd  - Monitor routine hemodynamics

## 2022-10-06 NOTE — PROGRESS NOTE ADULT - PROBLEM SELECTOR PLAN 4
Pt with history of COPD  -Stable  - Continue home medications  -Trelegy Ellipta therapeutic interchange in hospital  -Singulair 10 mg qd  - Encourage incentive spirometry  - Monitor respiratory status   - RVP neg  -no home Oxygen use  -pulm (El) consulted, f/u recs

## 2022-10-06 NOTE — PROGRESS NOTE ADULT - SUBJECTIVE AND OBJECTIVE BOX
Patient is a 81y old  Female who presents with a chief complaint of Pneumonia (06 Oct 2022 12:07)      INTERVAL HPI/OVERNIGHT EVENTS:    continues to have cough and shortness of breath    MEDICATIONS  (STANDING):  aMIOdarone    Tablet 100 milliGRAM(s) Oral daily  azithromycin   Tablet 500 milliGRAM(s) Oral daily  budesonide  80 MICROgram(s)/formoterol 4.5 MICROgram(s) Inhaler 2 Puff(s) Inhalation two times a day  cefTRIAXone   IVPB 1000 milliGRAM(s) IV Intermittent every 24 hours  dextrose 5%. 1000 milliLiter(s) (50 mL/Hr) IV Continuous <Continuous>  dextrose 5%. 1000 milliLiter(s) (100 mL/Hr) IV Continuous <Continuous>  dextrose 50% Injectable 25 Gram(s) IV Push once  dextrose 50% Injectable 12.5 Gram(s) IV Push once  dextrose 50% Injectable 25 Gram(s) IV Push once  diltiazem    Tablet 30 milliGRAM(s) Oral two times a day  folic acid 1 milliGRAM(s) Oral daily  gabapentin 400 milliGRAM(s) Oral two times a day  glucagon  Injectable 1 milliGRAM(s) IntraMuscular once  insulin lispro (ADMELOG) corrective regimen sliding scale   SubCutaneous three times a day before meals  insulin lispro (ADMELOG) corrective regimen sliding scale   SubCutaneous at bedtime  montelukast 10 milliGRAM(s) Oral daily  pantoprazole    Tablet 40 milliGRAM(s) Oral before breakfast  simvastatin 10 milliGRAM(s) Oral at bedtime  tiotropium 18 MICROgram(s) Capsule 1 Capsule(s) Inhalation daily  traMADol 50 milliGRAM(s) Oral two times a day  warfarin 4 milliGRAM(s) Oral once      MEDICATIONS  (PRN):  acetaminophen     Tablet .. 650 milliGRAM(s) Oral every 6 hours PRN Temp greater or equal to 38C (100.4F), Mild Pain (1 - 3)  aluminum hydroxide/magnesium hydroxide/simethicone Suspension 30 milliLiter(s) Oral every 4 hours PRN Dyspepsia  dextrose Oral Gel 15 Gram(s) Oral once PRN Blood Glucose LESS THAN 70 milliGRAM(s)/deciliter  dicyclomine 10 milliGRAM(s) Oral two times a day before meals PRN Constipation  melatonin 3 milliGRAM(s) Oral at bedtime PRN Insomnia  ondansetron Injectable 4 milliGRAM(s) IV Push every 8 hours PRN Nausea and/or Vomiting      Allergies    No Known Allergies    Intolerances        PAST MEDICAL & SURGICAL HISTORY:  COPD (chronic obstructive pulmonary disease)      DM (diabetes mellitus)      PAF (paroxysmal atrial fibrillation)  s/p ablation      Hiatal hernia      GIB (gastrointestinal bleeding)      S/P hysterectomy      S/P foot surgery      S/P knee surgery      After cataract  bilateral eye cataract surgically removed      Closed right hip fracture  rigth hip ORIF 2016      S/P IVC filter  2016      S/P carpal tunnel release  Right  &amp; 17          Vital Signs Last 24 Hrs  T(C): 37.8 (06 Oct 2022 12:55), Max: 37.8 (06 Oct 2022 12:55)  T(F): 100.1 (06 Oct 2022 12:55), Max: 100.1 (06 Oct 2022 12:55)  HR: 82 (06 Oct 2022 12:55) (71 - 82)  BP: 147/65 (06 Oct 2022 12:55) (113/67 - 147/65)  BP(mean): --  RR: 18 (06 Oct 2022 12:55) (18 - 18)  SpO2: 90% (06 Oct 2022 12:55) (90% - 96%)    Parameters below as of 06 Oct 2022 12:55  Patient On (Oxygen Delivery Method): room air        PHYSICAL EXAMINATION:    GENERAL: The patient is awake and alert in no apparent distress.     HEENT: Head is normocephalic and atraumatic    NECK: no JVD    LUNGS: diminished air entry bilateral    HEART: Regular rate and rhythm without murmur.    ABDOMEN: Soft, nontender, and nondistended.      EXTREMITIES: Without any cyanosis, clubbing, rash, lesions or edema.    NEUROLOGIC: Grossly intact.    SKIN: No ulceration or induration present.      LABS:                        7.6    15.81 )-----------( 196      ( 06 Oct 2022 07:30 )             24.4     10-06    138  |  105  |  32<H>  ----------------------------<  84  4.3   |  26  |  1.40<H>    Ca    9.0      06 Oct 2022 07:30    TPro  6.3  /  Alb  2.9<L>  /  TBili  1.2  /  DBili  x   /  AST  13<L>  /  ALT  20  /  AlkPhos  60  10-    PT/INR - ( 06 Oct 2022 07:30 )   PT: 28.5 sec;   INR: 2.41 ratio         PTT - ( 05 Oct 2022 13:40 )  PTT:37.0 sec  Urinalysis Basic - ( 05 Oct 2022 15:30 )    Color: Yellow / Appearance: Clear / S.015 / pH: x  Gluc: x / Ketone: Negative  / Bili: Negative / Urobili: Negative   Blood: x / Protein: 15 / Nitrite: Negative   Leuk Esterase: Trace / RBC: 0-2 /HPF / WBC 0-2   Sq Epi: x / Non Sq Epi: Few / Bacteria: x                Lactate, Blood: 1.7 mmol/L (10-05-22 @ 18:40)    Procalcitonin, Serum: 1.04 ng/mL (10-06-22 @ 07:30)      CT chest revealed significant right lower lobe consoldiation    Assessment:    Right lower lobe pneumonia  Hx COPD  Hx Pulmonary emboli - S/P IVC filter  IGG subclass deficiency  Myelofibrosis  Chronic Kidney Disease    Plan:    Supplemental oxygen PRN  IV antibiotics  Check urinary antigen for legionella  Symbicort + Spiriva inhalers     Patient is a 81y old  Female who presents with a chief complaint of Pneumonia (06 Oct 2022 12:07)      INTERVAL HPI/OVERNIGHT EVENTS:    continues to have cough and shortness of breath    MEDICATIONS  (STANDING):  aMIOdarone    Tablet 100 milliGRAM(s) Oral daily  azithromycin   Tablet 500 milliGRAM(s) Oral daily  budesonide  80 MICROgram(s)/formoterol 4.5 MICROgram(s) Inhaler 2 Puff(s) Inhalation two times a day  cefTRIAXone   IVPB 1000 milliGRAM(s) IV Intermittent every 24 hours  dextrose 5%. 1000 milliLiter(s) (50 mL/Hr) IV Continuous <Continuous>  dextrose 5%. 1000 milliLiter(s) (100 mL/Hr) IV Continuous <Continuous>  dextrose 50% Injectable 25 Gram(s) IV Push once  dextrose 50% Injectable 12.5 Gram(s) IV Push once  dextrose 50% Injectable 25 Gram(s) IV Push once  diltiazem    Tablet 30 milliGRAM(s) Oral two times a day  folic acid 1 milliGRAM(s) Oral daily  gabapentin 400 milliGRAM(s) Oral two times a day  glucagon  Injectable 1 milliGRAM(s) IntraMuscular once  insulin lispro (ADMELOG) corrective regimen sliding scale   SubCutaneous three times a day before meals  insulin lispro (ADMELOG) corrective regimen sliding scale   SubCutaneous at bedtime  montelukast 10 milliGRAM(s) Oral daily  pantoprazole    Tablet 40 milliGRAM(s) Oral before breakfast  simvastatin 10 milliGRAM(s) Oral at bedtime  tiotropium 18 MICROgram(s) Capsule 1 Capsule(s) Inhalation daily  traMADol 50 milliGRAM(s) Oral two times a day  warfarin 4 milliGRAM(s) Oral once      MEDICATIONS  (PRN):  acetaminophen     Tablet .. 650 milliGRAM(s) Oral every 6 hours PRN Temp greater or equal to 38C (100.4F), Mild Pain (1 - 3)  aluminum hydroxide/magnesium hydroxide/simethicone Suspension 30 milliLiter(s) Oral every 4 hours PRN Dyspepsia  dextrose Oral Gel 15 Gram(s) Oral once PRN Blood Glucose LESS THAN 70 milliGRAM(s)/deciliter  dicyclomine 10 milliGRAM(s) Oral two times a day before meals PRN Constipation  melatonin 3 milliGRAM(s) Oral at bedtime PRN Insomnia  ondansetron Injectable 4 milliGRAM(s) IV Push every 8 hours PRN Nausea and/or Vomiting      Allergies    No Known Allergies    Intolerances        PAST MEDICAL & SURGICAL HISTORY:  COPD (chronic obstructive pulmonary disease)      DM (diabetes mellitus)      PAF (paroxysmal atrial fibrillation)  s/p ablation      Hiatal hernia      GIB (gastrointestinal bleeding)      S/P hysterectomy      S/P foot surgery      S/P knee surgery      After cataract  bilateral eye cataract surgically removed      Closed right hip fracture  rigth hip ORIF 2016      S/P IVC filter  2016      S/P carpal tunnel release  Right  &amp; 17          Vital Signs Last 24 Hrs  T(C): 37.8 (06 Oct 2022 12:55), Max: 37.8 (06 Oct 2022 12:55)  T(F): 100.1 (06 Oct 2022 12:55), Max: 100.1 (06 Oct 2022 12:55)  HR: 82 (06 Oct 2022 12:55) (71 - 82)  BP: 147/65 (06 Oct 2022 12:55) (113/67 - 147/65)  BP(mean): --  RR: 18 (06 Oct 2022 12:55) (18 - 18)  SpO2: 90% (06 Oct 2022 12:55) (90% - 96%)    Parameters below as of 06 Oct 2022 12:55  Patient On (Oxygen Delivery Method): room air        PHYSICAL EXAMINATION:    GENERAL: The patient is awake and alert in no apparent distress.     HEENT: Head is normocephalic and atraumatic    NECK: no JVD    LUNGS: diminished air entry bilateral    HEART: Regular rate and rhythm without murmur.    ABDOMEN: Soft, nontender, and nondistended.      EXTREMITIES: Without any cyanosis, clubbing, rash, lesions or edema.    NEUROLOGIC: Grossly intact.    SKIN: No ulceration or induration present.      LABS:                        7.6    15.81 )-----------( 196      ( 06 Oct 2022 07:30 )             24.4     10-06    138  |  105  |  32<H>  ----------------------------<  84  4.3   |  26  |  1.40<H>    Ca    9.0      06 Oct 2022 07:30    TPro  6.3  /  Alb  2.9<L>  /  TBili  1.2  /  DBili  x   /  AST  13<L>  /  ALT  20  /  AlkPhos  60  10-    PT/INR - ( 06 Oct 2022 07:30 )   PT: 28.5 sec;   INR: 2.41 ratio         PTT - ( 05 Oct 2022 13:40 )  PTT:37.0 sec  Urinalysis Basic - ( 05 Oct 2022 15:30 )    Color: Yellow / Appearance: Clear / S.015 / pH: x  Gluc: x / Ketone: Negative  / Bili: Negative / Urobili: Negative   Blood: x / Protein: 15 / Nitrite: Negative   Leuk Esterase: Trace / RBC: 0-2 /HPF / WBC 0-2   Sq Epi: x / Non Sq Epi: Few / Bacteria: x                Lactate, Blood: 1.7 mmol/L (10-05-22 @ 18:40)    Procalcitonin, Serum: 1.04 ng/mL (10-06-22 @ 07:30)      CT chest revealed significant right lower lobe consolidation    Assessment:    Right lower lobe pneumonia  Hx COPD  Hx Pulmonary emboli - S/P IVC filter  IGG subclass deficiency  Myelofibrosis  Chronic Kidney Disease    Plan:    Supplemental oxygen PRN  IV antibiotics  Check urinary antigen for legionella  Symbicort + Spiriva inhalers

## 2022-10-06 NOTE — CONSULT NOTE ADULT - SUBJECTIVE AND OBJECTIVE BOX
Patient is a 81y old  Female who presents with a chief complaint of Pneumonia (06 Oct 2022 18:10)      HPI:  81-year-old obese female past medical history previously diagnosied with DMII (no longer current), HTN, GERD, COPD not on home O2 (with history of Pulmonary emboli - S/P IVC filter - followed by GI bleed, ischemic colitis, shock, and prolonged respiratory failure), pAfib s/p ablation currently on Coumadin, Aplastic Anemia, CKD, presents to the ER with complaints of generalized weakness this morning. Patient presents with daughter who states that yesterday patient was at her baseline and cooked a large meal for a family gathering for the holiday. This morning she was on the toilet and unable to get up due to weakness. Patient was found to be febrile 103 at home. Daughter reports patient slightly more confused than usual which she tends to get when she has fevers. Patient denies nausea vomiting diarrhea hematuria dysuria. Patient denies chest pain shortness of breath and worsening cough, although she does have a baseline productive cough due to her underlying COPD. Denies any sick contacts and no one else in the home is having the same symptoms.  Patient is back at baseline mental status, A and O x3. able to answer questions appropriately    of note: patient has recent hospital stay for superimposed bacterial pneumonia early 2022.    ED Course  Vitals: T:103.8, HR 77, /50, RR 17, O2 96 on RA  Labs: Hg 9.1, INR 2.22, Cr 1.7, Lactate 2.2, UA clear,   Imaging: CXR: 1. Ill-defined increased density in the right lower lobe may be due to the overlying breast shadow however follow-up is recommended to help exclude an evolving right lower lobe infiltrate in this region. 2. No other significant findings.   Received: Vancomycin 1250mg x1, APAP 975mg x1, Zosyn 3.375g x1  EKG NSR without ischemic changes   (05 Oct 2022 16:45)       ROS:  Negative except for:    PAST MEDICAL & SURGICAL HISTORY:  COPD (chronic obstructive pulmonary disease)      DM (diabetes mellitus)      PAF (paroxysmal atrial fibrillation)  s/p ablation      Hiatal hernia      GIB (gastrointestinal bleeding)      S/P hysterectomy      S/P foot surgery      S/P knee surgery      After cataract  bilateral eye cataract surgically removed      Closed right hip fracture  rigth hip ORIF july 19 2016      S/P IVC filter  nov 2016      S/P carpal tunnel release  Right 2008 &amp; 6/27/17          SOCIAL HISTORY:    FAMILY HISTORY:  Family history of bladder cancer (Mother)    Family history of myocardial infarction (Father)        MEDICATIONS  (STANDING):  aMIOdarone    Tablet 100 milliGRAM(s) Oral daily  azithromycin   Tablet 500 milliGRAM(s) Oral daily  budesonide  80 MICROgram(s)/formoterol 4.5 MICROgram(s) Inhaler 2 Puff(s) Inhalation two times a day  cefTRIAXone   IVPB 1000 milliGRAM(s) IV Intermittent every 24 hours  dextrose 5%. 1000 milliLiter(s) (50 mL/Hr) IV Continuous <Continuous>  dextrose 5%. 1000 milliLiter(s) (100 mL/Hr) IV Continuous <Continuous>  dextrose 50% Injectable 25 Gram(s) IV Push once  dextrose 50% Injectable 12.5 Gram(s) IV Push once  dextrose 50% Injectable 25 Gram(s) IV Push once  diltiazem    Tablet 30 milliGRAM(s) Oral two times a day  folic acid 1 milliGRAM(s) Oral daily  gabapentin 400 milliGRAM(s) Oral two times a day  glucagon  Injectable 1 milliGRAM(s) IntraMuscular once  insulin lispro (ADMELOG) corrective regimen sliding scale   SubCutaneous three times a day before meals  insulin lispro (ADMELOG) corrective regimen sliding scale   SubCutaneous at bedtime  montelukast 10 milliGRAM(s) Oral daily  pantoprazole    Tablet 40 milliGRAM(s) Oral before breakfast  simvastatin 10 milliGRAM(s) Oral at bedtime  tiotropium 18 MICROgram(s) Capsule 1 Capsule(s) Inhalation daily  traMADol 50 milliGRAM(s) Oral two times a day    MEDICATIONS  (PRN):  acetaminophen     Tablet .. 650 milliGRAM(s) Oral every 6 hours PRN Temp greater or equal to 38C (100.4F), Mild Pain (1 - 3)  aluminum hydroxide/magnesium hydroxide/simethicone Suspension 30 milliLiter(s) Oral every 4 hours PRN Dyspepsia  dextrose Oral Gel 15 Gram(s) Oral once PRN Blood Glucose LESS THAN 70 milliGRAM(s)/deciliter  dicyclomine 10 milliGRAM(s) Oral two times a day before meals PRN Constipation  melatonin 3 milliGRAM(s) Oral at bedtime PRN Insomnia  ondansetron Injectable 4 milliGRAM(s) IV Push every 8 hours PRN Nausea and/or Vomiting      Allergies    No Known Allergies    Intolerances        Vital Signs Last 24 Hrs  T(C): 36.8 (06 Oct 2022 22:00), Max: 39.2 (06 Oct 2022 20:24)  T(F): 98.3 (06 Oct 2022 22:00), Max: 102.6 (06 Oct 2022 20:24)  HR: 84 (06 Oct 2022 20:25) (80 - 84)  BP: 115/68 (06 Oct 2022 20:25) (115/68 - 147/65)  BP(mean): --  RR: 18 (06 Oct 2022 20:25) (18 - 18)  SpO2: 93% (06 Oct 2022 20:25) (86% - 93%)    Parameters below as of 06 Oct 2022 20:25  Patient On (Oxygen Delivery Method): nasal cannula        PHYSICAL EXAM  General: adult in NAD  HEENT: clear oropharynx, anicteric sclera, pink conjunctivae  Neck: supple  CV: normal S1S2 with no murmur rubs or gallops  Lungs: clear to auscultation, no wheezes, no rhales  Abdomen: soft non-tender non-distended, no hepato/splenomegaly  Ext: no clubbing cyanosis or edema  Skin: no rashes and no petichiae  Neuro: alert and oriented X3 no focal deficits      LABS:    CBC Full  -  ( 06 Oct 2022 07:30 )  WBC Count : 15.81 K/uL  RBC Count : 2.38 M/uL  Hemoglobin : 7.6 g/dL  Hematocrit : 24.4 %  Platelet Count - Automated : 196 K/uL  Mean Cell Volume : 102.5 fl  Mean Cell Hemoglobin : 31.9 pg  Mean Cell Hemoglobin Concentration : 31.1 gm/dL  Auto Neutrophil # : 12.54 K/uL  Auto Lymphocyte # : 1.56 K/uL  Auto Monocyte # : 1.24 K/uL  Auto Eosinophil # : 0.00 K/uL  Auto Basophil # : 0.02 K/uL  Auto Neutrophil % : 79.4 %  Auto Lymphocyte % : 9.9 %  Auto Monocyte % : 7.8 %  Auto Eosinophil % : 0.0 %  Auto Basophil % : 0.1 %    10-06    138  |  105  |  32<H>  ----------------------------<  84  4.3   |  26  |  1.40<H>    Ca    9.0      06 Oct 2022 07:30    TPro  6.3  /  Alb  2.9<L>  /  TBili  1.2  /  DBili  x   /  AST  13<L>  /  ALT  20  /  AlkPhos  60  10-06    PT/INR - ( 06 Oct 2022 07:30 )   PT: 28.5 sec;   INR: 2.41 ratio         PTT - ( 05 Oct 2022 13:40 )  PTT:37.0 sec          BLOOD SMEAR INTERPRETATION:    RADIOLOGY & ADDITIONAL STUDIES:  < from: CT Chest No Cont (10.06.22 @ 09:04) >  ACC: [id number] EXAM:  CT CHEST                          PROCEDURE DATE:  10/06/2022          INTERPRETATION:  CLINICAL INFORMATION: Shortness of breath and weakness   with abnormal recent chest x-ray, evaluate for pneumonia.    COMPARISON: Chest radiograph 10/5/2022. CT chest 9/2/2016.    CONTRAST/COMPLICATIONS:  IV Contrast: NONE  Oral Contrast: NONE  Complications: None reported at time of study completion    PROCEDURE:  CT scan of the chest was obtained without intravenous contrast.    FINDINGS:    LYMPH NODES: Multiple subcentimeter in short axis mediastinal nodes.    HEART/VASCULATURE: The heart is normal in size. Small pericardial   effusion. Aortic valvular calcifications. Ascending thoracic aorta   measures 3.8 cm. Main pulmonary artery diameter 4.1 cm.    AIRWAYS/LUNGS/PLEURA: Minimal tracheal secretions. Right basilar   consolidation. Clustered micronodular opacities multifocally bilaterally   may represent mucoid impacted peripheral airways. Few branching nodular   opacities at the left apex may also represent mucoid impacted airways.   Small right pleural effusion.    UPPER ABDOMEN: Nodular thickening of the left adrenal gland. Partially   visualized IVC filter. 1.8 cm left upper pole renal cyst.    BONES/SOFT TISSUES: Loss of height of a few lower thoracic and upper   lumbar vertebral bodies.    IMPRESSION:    Right lower lobe consolidation compatible with pneumonia. Recommend CT   chest follow-up to ensure clearing.    Small right pleural effusion.    --- End of Report ---           AJ CLARKE DO; Resident Radiologist  This document has been electronically signed.   DEANDRE BAUTISTA MD; Attending Radiologist  This document has been electronically signed. Oct  6 2022  1:53PM    < end of copied text >

## 2022-10-06 NOTE — CONSULT NOTE ADULT - SUBJECTIVE AND OBJECTIVE BOX
[facility]         Tori Jones, Pam, Sai, Manuel, Girish, El        [phone] (office)    Reason for Consult:    Interval HPI: Patient seen and examined at bedside. No acute events overnight. Awake, alert, laying in bed comfortably. Admits weakness. Denies headache, dizziness, chest pain, palpitations, SOB, N/V/D/C. Follows with Dr. Stout outpatient.     HPI:  81-year-old obese female past medical history previously diagnosied with DMII (no longer current), HTN, GERD, COPD not on home O2 (with history of Pulmonary emboli - S/P IVC filter - followed by GI bleed, ischemic colitis, shock, and prolonged respiratory failure), pAfib s/p ablation currently on Coumadin, Aplastic Anemia, CKD, presents to the ER with complaints of generalized weakness this morning. Patient presents with daughter who states that yesterday patient was at her baseline and cooked a large meal for a family gathering for the holiday. This morning she was on the toilet and unable to get up due to weakness. Patient was found to be febrile 103 at home. Daughter reports patient slightly more confused than usual which she tends to get when she has fevers. Patient denies nausea vomiting diarrhea hematuria dysuria. Patient denies chest pain shortness of breath and worsening cough, although she does have a baseline productive cough due to her underlying COPD. Denies any sick contacts and no one else in the home is having the same symptoms.  Patient is back at baseline mental status, A and O x3. able to answer questions appropriately    of note: patient has recent hospital stay for superimposed bacterial pneumonia early 2022.    ED Course  Vitals: T:103.8, HR 77, /50, RR 17, O2 96 on RA  Labs: Hg 9.1, INR 2.22, Cr 1.7, Lactate 2.2, UA clear,   Imaging: CXR: 1. Ill-defined increased density in the right lower lobe may be due to the overlying breast shadow however follow-up is recommended to help exclude an evolving right lower lobe infiltrate in this region. 2. No other significant findings.   Received: Vancomycin 1250mg x1, APAP 975mg x1, Zosyn 3.375g x1  EKG NSR without ischemic changes   (05 Oct 2022 16:45)      PAST MEDICAL & SURGICAL HISTORY:  COPD (chronic obstructive pulmonary disease)      DM (diabetes mellitus)      PAF (paroxysmal atrial fibrillation)  s/p ablation      Hiatal hernia      GIB (gastrointestinal bleeding)      S/P hysterectomy      S/P foot surgery      S/P knee surgery      After cataract  bilateral eye cataract surgically removed      Closed right hip fracture  rigth hip ORIF july 19 2016      S/P IVC filter  nov 2016      S/P carpal tunnel release  Right 2008 &amp; 6/27/17          SOCIAL HISTORY:   Previous Tobacco use 30 years ago, no alcohol use, lives at home with her     FAMILY HISTORY:  Family history of bladder cancer (Mother)    Family history of myocardial infarction age 48 (Father)        Home Medications:  amiodarone 100 mg oral tablet: 1 tab(s) orally once a day (05 Oct 2022 16:37)  Bentyl 10 mg oral capsule: 1 cap(s) orally 2 times a day, As Needed (05 Oct 2022 16:37)  Cardizem: 25 milligram(s) orally 2 times a day (05 Oct 2022 16:37)  Coumadin 4 mg oral tablet: 1 tab(s) orally once a day  4mg 4x week; 6mg 3x week (05 Oct 2022 16:37)  folic acid 1 mg oral tablet: 1 tab(s) orally once a day (05 Oct 2022 16:37)  gabapentin 400 mg oral capsule: 1 cap(s) orally 2 times a day (05 Oct 2022 16:37)  NexIUM: 15 milligram(s) orally once a day (05 Oct 2022 16:37)  Procrit 10,000 units/mL preservative-free injectable solution: injectable once a week (05 Oct 2022 16:37)  simvastatin 10 mg oral tablet: 1 tab(s) orally once a day (at bedtime) (05 Oct 2022 16:37)  Singulair 10 mg oral tablet: 1 tab(s) orally once a day (05 Oct 2022 16:37)  traMADol 50 mg oral tablet: 1 tab(s) orally 2 times a day (05 Oct 2022 16:37)  Trelegy Ellipta: inhaled once a day (05 Oct 2022 16:37)      MEDICATIONS  (STANDING):  aMIOdarone    Tablet 100 milliGRAM(s) Oral daily  azithromycin   Tablet 500 milliGRAM(s) Oral daily  budesonide  80 MICROgram(s)/formoterol 4.5 MICROgram(s) Inhaler 2 Puff(s) Inhalation two times a day  cefTRIAXone   IVPB 1000 milliGRAM(s) IV Intermittent every 24 hours  dextrose 5%. 1000 milliLiter(s) (50 mL/Hr) IV Continuous <Continuous>  dextrose 5%. 1000 milliLiter(s) (100 mL/Hr) IV Continuous <Continuous>  dextrose 50% Injectable 25 Gram(s) IV Push once  dextrose 50% Injectable 12.5 Gram(s) IV Push once  dextrose 50% Injectable 25 Gram(s) IV Push once  diltiazem    Tablet 30 milliGRAM(s) Oral two times a day  folic acid 1 milliGRAM(s) Oral daily  gabapentin 400 milliGRAM(s) Oral two times a day  glucagon  Injectable 1 milliGRAM(s) IntraMuscular once  insulin lispro (ADMELOG) corrective regimen sliding scale   SubCutaneous three times a day before meals  insulin lispro (ADMELOG) corrective regimen sliding scale   SubCutaneous at bedtime  montelukast 10 milliGRAM(s) Oral daily  pantoprazole    Tablet 40 milliGRAM(s) Oral before breakfast  simvastatin 10 milliGRAM(s) Oral at bedtime  tiotropium 18 MICROgram(s) Capsule 1 Capsule(s) Inhalation daily  traMADol 50 milliGRAM(s) Oral two times a day  warfarin 4 milliGRAM(s) Oral once    MEDICATIONS  (PRN):  acetaminophen     Tablet .. 650 milliGRAM(s) Oral every 6 hours PRN Temp greater or equal to 38C (100.4F), Mild Pain (1 - 3)  aluminum hydroxide/magnesium hydroxide/simethicone Suspension 30 milliLiter(s) Oral every 4 hours PRN Dyspepsia  dextrose Oral Gel 15 Gram(s) Oral once PRN Blood Glucose LESS THAN 70 milliGRAM(s)/deciliter  dicyclomine 10 milliGRAM(s) Oral two times a day before meals PRN Constipation  melatonin 3 milliGRAM(s) Oral at bedtime PRN Insomnia  ondansetron Injectable 4 milliGRAM(s) IV Push every 8 hours PRN Nausea and/or Vomiting      Allergies    No Known Allergies    Intolerances        REVIEW OF SYSTEMS: Negative except as per HPI.    VITAL SIGNS:   Vital Signs Last 24 Hrs  T(C): 37.4 (06 Oct 2022 05:00), Max: 39.9 (05 Oct 2022 13:19)  T(F): 99.3 (06 Oct 2022 05:00), Max: 103.8 (05 Oct 2022 13:19)  HR: 80 (06 Oct 2022 05:00) (71 - 81)  BP: 124/70 (06 Oct 2022 05:00) (109/50 - 129/74)  BP(mean): --  RR: 18 (06 Oct 2022 05:00) (16 - 18)  SpO2: 93% (06 Oct 2022 05:00) (91% - 96%)    Parameters below as of 06 Oct 2022 05:00  Patient On (Oxygen Delivery Method): room air        I&O's Summary    05 Oct 2022 07:01  -  06 Oct 2022 07:00  --------------------------------------------------------  IN: 0 mL / OUT: 550 mL / NET: -550 mL        PHYSICAL EXAM:  Constitutional: NAD, well-developed  HEENT NC/AT, moist mucous membranes  Pulmonary: Non-labored, decreased inspiratory effort, no wheezing, rales or rhonchi  Cardiovascular: +S1, S2, RRR, no murmur  Gastrointestinal: Soft, nontender, nondistended, normoactive bowel sounds  Extremities: No peripheral edema   Neurological: Alert, strength and sensitivity are grossly intact  Skin: No obvious lesions/rashes  Psych: Mood & affect appropriate    LABS: All Labs Reviewed:                        7.6    15.81 )-----------( 196      ( 06 Oct 2022 07:30 )             24.4                         9.1    9.88  )-----------( 226      ( 05 Oct 2022 13:40 )             28.7     06 Oct 2022 07:30    138    |  105    |  32     ----------------------------<  84     4.3     |  26     |  1.40   05 Oct 2022 13:40    137    |  105    |  37     ----------------------------<  116    5.1     |  27     |  1.70     Ca    9.0        06 Oct 2022 07:30  Ca    9.5        05 Oct 2022 13:40    TPro  6.3    /  Alb  2.9    /  TBili  1.2    /  DBili  x      /  AST  13     /  ALT  20     /  AlkPhos  60     06 Oct 2022 07:30  TPro  7.5    /  Alb  3.8    /  TBili  1.1    /  DBili  x      /  AST  21     /  ALT  27     /  AlkPhos  69     05 Oct 2022 13:40    PT/INR - ( 06 Oct 2022 07:30 )   PT: 28.5 sec;   INR: 2.41 ratio         PTT - ( 05 Oct 2022 13:40 )  PTT:37.0 sec      Blood Culture:         EKG: NSR, left axis; unchanged from previous     RADIOLOGY:    < from: Xray Chest 1 View-PORTABLE IMMEDIATE (10.05.22 @ 13:45) >  IMPRESSION:  1. Ill-defined increased density in the right lower lobe may be due to   the overlying breast shadow however follow-up is recommended to help   exclude an evolving right lower lobe infiltrate in this region.  2. No other significant findings.    --- End of Report ---            SEKOU ROLON MD; Attending Radiologist  This document has been electronically signed. Oct  5 2022  1:49PM    < end of copied text >     [facility]         Tori Jones, Pam, Sai, Manuel, Girish, El        [phone] (office)    Reason for Consult:    Interval HPI: Patient seen and examined at bedside. No acute events overnight. Awake, alert, laying in bed comfortably. Admits weakness. Denies headache, dizziness, chest pain, palpitations, SOB, N/V/D/C. Follows with Dr. Stout outpatient.     HPI:  81-year-old obese female past medical history previously diagnosied with DMII (no longer current), HTN, GERD, COPD not on home O2 (with history of Pulmonary emboli - S/P IVC filter - followed by GI bleed, ischemic colitis, shock, and prolonged respiratory failure), pAfib s/p ablation currently on Coumadin, Aplastic Anemia, CKD, presents to the ER with complaints of generalized weakness this morning. Patient presents with daughter who states that yesterday patient was at her baseline and cooked a large meal for a family gathering for the holiday. This morning she was on the toilet and unable to get up due to weakness. Patient was found to be febrile 103 at home. Daughter reports patient slightly more confused than usual which she tends to get when she has fevers. Patient denies nausea vomiting diarrhea hematuria dysuria. Patient denies chest pain shortness of breath and worsening cough, although she does have a baseline productive cough due to her underlying COPD. Denies any sick contacts and no one else in the home is having the same symptoms.  Patient is back at baseline mental status, A and O x3. able to answer questions appropriately    of note: patient has recent hospital stay for superimposed bacterial pneumonia early 2022.    ED Course  Vitals: T:103.8, HR 77, /50, RR 17, O2 96 on RA  Labs: Hg 9.1, INR 2.22, Cr 1.7, Lactate 2.2, UA clear,   Imaging: CXR: 1. Ill-defined increased density in the right lower lobe may be due to the overlying breast shadow however follow-up is recommended to help exclude an evolving right lower lobe infiltrate in this region. 2. No other significant findings.   Received: Vancomycin 1250mg x1, APAP 975mg x1, Zosyn 3.375g x1  EKG NSR without ischemic changes   (05 Oct 2022 16:45)      PAST MEDICAL & SURGICAL HISTORY:  COPD (chronic obstructive pulmonary disease)      DM (diabetes mellitus)      PAF (paroxysmal atrial fibrillation)  s/p ablation      Hiatal hernia      GIB (gastrointestinal bleeding)      S/P hysterectomy      S/P foot surgery      S/P knee surgery      After cataract  bilateral eye cataract surgically removed      Closed right hip fracture  rigth hip ORIF july 19 2016      S/P IVC filter  nov 2016      S/P carpal tunnel release  Right 2008 &amp; 6/27/17          SOCIAL HISTORY:   Previous Tobacco use 30 years ago, no alcohol use, lives at home with her     FAMILY HISTORY:  Family history of bladder cancer (Mother)    Family history of myocardial infarction age 48 (Father)        Home Medications:  amiodarone 100 mg oral tablet: 1 tab(s) orally once a day (05 Oct 2022 16:37)  Bentyl 10 mg oral capsule: 1 cap(s) orally 2 times a day, As Needed (05 Oct 2022 16:37)  Cardizem: 25 milligram(s) orally 2 times a day (05 Oct 2022 16:37)  Coumadin 4 mg oral tablet: 1 tab(s) orally once a day  4mg 4x week; 6mg 3x week (05 Oct 2022 16:37)  folic acid 1 mg oral tablet: 1 tab(s) orally once a day (05 Oct 2022 16:37)  gabapentin 400 mg oral capsule: 1 cap(s) orally 2 times a day (05 Oct 2022 16:37)  NexIUM: 15 milligram(s) orally once a day (05 Oct 2022 16:37)  Procrit 10,000 units/mL preservative-free injectable solution: injectable once a week (05 Oct 2022 16:37)  simvastatin 10 mg oral tablet: 1 tab(s) orally once a day (at bedtime) (05 Oct 2022 16:37)  Singulair 10 mg oral tablet: 1 tab(s) orally once a day (05 Oct 2022 16:37)  traMADol 50 mg oral tablet: 1 tab(s) orally 2 times a day (05 Oct 2022 16:37)  Trelegy Ellipta: inhaled once a day (05 Oct 2022 16:37)      MEDICATIONS  (STANDING):  aMIOdarone    Tablet 100 milliGRAM(s) Oral daily  azithromycin   Tablet 500 milliGRAM(s) Oral daily  budesonide  80 MICROgram(s)/formoterol 4.5 MICROgram(s) Inhaler 2 Puff(s) Inhalation two times a day  cefTRIAXone   IVPB 1000 milliGRAM(s) IV Intermittent every 24 hours  dextrose 5%. 1000 milliLiter(s) (50 mL/Hr) IV Continuous <Continuous>  dextrose 5%. 1000 milliLiter(s) (100 mL/Hr) IV Continuous <Continuous>  dextrose 50% Injectable 25 Gram(s) IV Push once  dextrose 50% Injectable 12.5 Gram(s) IV Push once  dextrose 50% Injectable 25 Gram(s) IV Push once  diltiazem    Tablet 30 milliGRAM(s) Oral two times a day  folic acid 1 milliGRAM(s) Oral daily  gabapentin 400 milliGRAM(s) Oral two times a day  glucagon  Injectable 1 milliGRAM(s) IntraMuscular once  insulin lispro (ADMELOG) corrective regimen sliding scale   SubCutaneous three times a day before meals  insulin lispro (ADMELOG) corrective regimen sliding scale   SubCutaneous at bedtime  montelukast 10 milliGRAM(s) Oral daily  pantoprazole    Tablet 40 milliGRAM(s) Oral before breakfast  simvastatin 10 milliGRAM(s) Oral at bedtime  tiotropium 18 MICROgram(s) Capsule 1 Capsule(s) Inhalation daily  traMADol 50 milliGRAM(s) Oral two times a day  warfarin 4 milliGRAM(s) Oral once    MEDICATIONS  (PRN):  acetaminophen     Tablet .. 650 milliGRAM(s) Oral every 6 hours PRN Temp greater or equal to 38C (100.4F), Mild Pain (1 - 3)  aluminum hydroxide/magnesium hydroxide/simethicone Suspension 30 milliLiter(s) Oral every 4 hours PRN Dyspepsia  dextrose Oral Gel 15 Gram(s) Oral once PRN Blood Glucose LESS THAN 70 milliGRAM(s)/deciliter  dicyclomine 10 milliGRAM(s) Oral two times a day before meals PRN Constipation  melatonin 3 milliGRAM(s) Oral at bedtime PRN Insomnia  ondansetron Injectable 4 milliGRAM(s) IV Push every 8 hours PRN Nausea and/or Vomiting      Allergies    No Known Allergies    Intolerances        REVIEW OF SYSTEMS: Negative except as per HPI.    VITAL SIGNS:   Vital Signs Last 24 Hrs  T(C): 37.4 (06 Oct 2022 05:00), Max: 39.9 (05 Oct 2022 13:19)  T(F): 99.3 (06 Oct 2022 05:00), Max: 103.8 (05 Oct 2022 13:19)  HR: 80 (06 Oct 2022 05:00) (71 - 81)  BP: 124/70 (06 Oct 2022 05:00) (109/50 - 129/74)  BP(mean): --  RR: 18 (06 Oct 2022 05:00) (16 - 18)  SpO2: 93% (06 Oct 2022 05:00) (91% - 96%)    Parameters below as of 06 Oct 2022 05:00  Patient On (Oxygen Delivery Method): room air        I&O's Summary    05 Oct 2022 07:01  -  06 Oct 2022 07:00  --------------------------------------------------------  IN: 0 mL / OUT: 550 mL / NET: -550 mL        PHYSICAL EXAM:  Constitutional: NAD, well-developed  HEENT NC/AT, moist mucous membranes  Pulmonary: Non-labored, decreased inspiratory effort, Scattered rhonchi   Cardiovascular: +S1, S2, RRR, no murmur  Gastrointestinal: Soft, nontender, nondistended, normoactive bowel sounds  Extremities: No peripheral edema   Neurological: Alert, strength and sensitivity are grossly intact  Skin: No obvious lesions/rashes  Psych: Mood & affect appropriate    LABS: All Labs Reviewed:                        7.6    15.81 )-----------( 196      ( 06 Oct 2022 07:30 )             24.4                         9.1    9.88  )-----------( 226      ( 05 Oct 2022 13:40 )             28.7     06 Oct 2022 07:30    138    |  105    |  32     ----------------------------<  84     4.3     |  26     |  1.40   05 Oct 2022 13:40    137    |  105    |  37     ----------------------------<  116    5.1     |  27     |  1.70     Ca    9.0        06 Oct 2022 07:30  Ca    9.5        05 Oct 2022 13:40    TPro  6.3    /  Alb  2.9    /  TBili  1.2    /  DBili  x      /  AST  13     /  ALT  20     /  AlkPhos  60     06 Oct 2022 07:30  TPro  7.5    /  Alb  3.8    /  TBili  1.1    /  DBili  x      /  AST  21     /  ALT  27     /  AlkPhos  69     05 Oct 2022 13:40    PT/INR - ( 06 Oct 2022 07:30 )   PT: 28.5 sec;   INR: 2.41 ratio         PTT - ( 05 Oct 2022 13:40 )  PTT:37.0 sec      Blood Culture:         EKG: NSR, left axis; unchanged from previous     RADIOLOGY:    < from: Xray Chest 1 View-PORTABLE IMMEDIATE (10.05.22 @ 13:45) >  IMPRESSION:  1. Ill-defined increased density in the right lower lobe may be due to   the overlying breast shadow however follow-up is recommended to help   exclude an evolving right lower lobe infiltrate in this region.  2. No other significant findings.    --- End of Report ---            SEKOU ROLON MD; Attending Radiologist  This document has been electronically signed. Oct  5 2022  1:49PM    < end of copied text >

## 2022-10-06 NOTE — CONSULT NOTE ADULT - SUBJECTIVE AND OBJECTIVE BOX
Patient is a 81y old  Female who presents with a chief complaint of Pneumonia (05 Oct 2022 16:45)    HPI:  81-year-old obese female past medical history previously diagnosied with DMII (no longer current), HTN, GERD, COPD not on home O2 (with history of Pulmonary emboli - S/P IVC filter - followed by GI bleed, ischemic colitis, shock, and prolonged respiratory failure), pAfib s/p ablation currently on Coumadin, Aplastic Anemia, CKD, presents to the ER with complaints of generalized weakness this morning. Patient presents with daughter who states that yesterday patient was at her baseline and cooked a large meal for a family gathering for the holiday. This morning she was on the toilet and unable to get up due to weakness. Patient was found to be febrile 103 at home. Daughter reports patient slightly more confused than usual which she tends to get when she has fevers. Patient denies nausea vomiting diarrhea hematuria dysuria. Patient denies chest pain shortness of breath and worsening cough, although she does have a baseline productive cough due to her underlying COPD. Denies any sick contacts and no one else in the home is having the same symptoms.  Patient is back at baseline mental status, A and O x3. able to answer questions appropriately    of note: patient has recent hospital stay for superimposed bacterial pneumonia early .    ED Course  Vitals: T:103.8, HR 77, /50, RR 17, O2 96 on RA  Labs: Hg 9.1, INR 2.22, Cr 1.7, Lactate 2.2, UA clear,   Imaging: CXR: 1. Ill-defined increased density in the right lower lobe may be due to the overlying breast shadow however follow-up is recommended to help exclude an evolving right lower lobe infiltrate in this region. 2. No other significant findings.   Received: Vancomycin 1250mg x1, APAP 975mg x1, Zosyn 3.375g x1  EKG NSR without ischemic changes   (05 Oct 2022 16:45)      PAST MEDICAL HISTORY:  COPD (chronic obstructive pulmonary disease)    DM (diabetes mellitus)    Pulmonary fibrosis    PAF (paroxysmal atrial fibrillation)    Hiatal hernia    GIB (gastrointestinal bleeding)    Pericarditis    Shoulder fracture, right    Hematoma        PAST SURGICAL HISTORY:  S/P hysterectomy    S/P foot surgery    S/P knee surgery    After cataract    Closed right hip fracture    S/P IVC filter    S/P carpal tunnel release        FAMILY HISTORY:  Family history of bladder cancer (Mother)    Family history of myocardial infarction (Father)        SOCIAL HISTORY:    Allergies    No Known Allergies    Intolerances      Home Medications:  amiodarone 100 mg oral tablet: 1 tab(s) orally once a day (05 Oct 2022 16:37)  Bentyl 10 mg oral capsule: 1 cap(s) orally 2 times a day, As Needed (05 Oct 2022 16:37)  Cardizem: 25 milligram(s) orally 2 times a day (05 Oct 2022 16:37)  Coumadin 4 mg oral tablet: 1 tab(s) orally once a day  4mg 4x week; 6mg 3x week (05 Oct 2022 16:37)  folic acid 1 mg oral tablet: 1 tab(s) orally once a day (05 Oct 2022 16:37)  gabapentin 400 mg oral capsule: 1 cap(s) orally 2 times a day (05 Oct 2022 16:37)  NexIUM: 15 milligram(s) orally once a day (05 Oct 2022 16:37)  Procrit 10,000 units/mL preservative-free injectable solution: injectable once a week (05 Oct 2022 16:37)  simvastatin 10 mg oral tablet: 1 tab(s) orally once a day (at bedtime) (05 Oct 2022 16:37)  Singulair 10 mg oral tablet: 1 tab(s) orally once a day (05 Oct 2022 16:37)  traMADol 50 mg oral tablet: 1 tab(s) orally 2 times a day (05 Oct 2022 16:37)  Trelegy Ellipta: inhaled once a day (05 Oct 2022 16:37)    MEDICATIONS  (STANDING):  aMIOdarone    Tablet 100 milliGRAM(s) Oral daily  azithromycin   Tablet 500 milliGRAM(s) Oral daily  budesonide  80 MICROgram(s)/formoterol 4.5 MICROgram(s) Inhaler 2 Puff(s) Inhalation two times a day  cefTRIAXone   IVPB 1000 milliGRAM(s) IV Intermittent every 24 hours  dextrose 5%. 1000 milliLiter(s) (50 mL/Hr) IV Continuous <Continuous>  dextrose 5%. 1000 milliLiter(s) (100 mL/Hr) IV Continuous <Continuous>  dextrose 50% Injectable 25 Gram(s) IV Push once  dextrose 50% Injectable 12.5 Gram(s) IV Push once  dextrose 50% Injectable 25 Gram(s) IV Push once  diltiazem    Tablet 30 milliGRAM(s) Oral two times a day  folic acid 1 milliGRAM(s) Oral daily  gabapentin 400 milliGRAM(s) Oral two times a day  glucagon  Injectable 1 milliGRAM(s) IntraMuscular once  insulin lispro (ADMELOG) corrective regimen sliding scale   SubCutaneous three times a day before meals  insulin lispro (ADMELOG) corrective regimen sliding scale   SubCutaneous at bedtime  montelukast 10 milliGRAM(s) Oral daily  pantoprazole    Tablet 40 milliGRAM(s) Oral before breakfast  simvastatin 10 milliGRAM(s) Oral at bedtime  tiotropium 18 MICROgram(s) Capsule 1 Capsule(s) Inhalation daily  traMADol 50 milliGRAM(s) Oral two times a day    MEDICATIONS  (PRN):  acetaminophen     Tablet .. 650 milliGRAM(s) Oral every 6 hours PRN Temp greater or equal to 38C (100.4F), Mild Pain (1 - 3)  aluminum hydroxide/magnesium hydroxide/simethicone Suspension 30 milliLiter(s) Oral every 4 hours PRN Dyspepsia  dextrose Oral Gel 15 Gram(s) Oral once PRN Blood Glucose LESS THAN 70 milliGRAM(s)/deciliter  dicyclomine 10 milliGRAM(s) Oral two times a day before meals PRN Constipation  melatonin 3 milliGRAM(s) Oral at bedtime PRN Insomnia  ondansetron Injectable 4 milliGRAM(s) IV Push every 8 hours PRN Nausea and/or Vomiting      REVIEW OF SYSTEMS:  General:   Respiratory: No cough, SOB  Cardiovascular: No CP or Palpitations	  Gastrointestinal: No nausea, Vomiting. No diarrhea  Genitourinary: No urinary complaints	  Musculoskeletal: No leg swelling, No new rash or lesions	  Neurological: 	  all other systems negative    T(F): 99.3 (10-06-22 @ 05:00), Max: 103.8 (10-05-22 @ 13:19)  HR: 80 (10-06-22 @ 05:00) (71 - 81)  BP: 124/70 (10-06-22 @ 05:00) (109/50 - 129/74)  RR: 18 (10-06-22 @ 05:00) (16 - 18)  SpO2: 93% (10-06-22 @ 05:00) (91% - 96%)  Wt(kg): --    PHYSICAL EXAM:  General: NAD  Respiratory: b/l air entry  Cardiovascular: S1 S2  Gastrointestinal: soft  Extremities: edema        10-05    137  |  105  |  37<H>  ----------------------------<  116<H>  5.1   |  27  |  1.70<H>    Ca    9.5      05 Oct 2022 13:40    TPro  7.5  /  Alb  3.8  /  TBili  1.1  /  DBili  x   /  AST  21  /  ALT  27  /  AlkPhos  69  10-05                          9.1    9.88  )-----------( 226      ( 05 Oct 2022 13:40 )             28.7       Hemoglobin: 9.1 g/dL (10-05 @ 13:40)  Hematocrit: 28.7 % (10-05 @ 13:40)  Calcium, Total Serum: 9.5 mg/dL (10-05 @ 13:40)  Potassium, Serum: 5.1 mmol/L (10-05 @ 13:40)      Creatinine, Serum: 1.70 (10-05 @ 13:40)      Urinalysis Basic - ( 05 Oct 2022 15:30 )    Color: Yellow / Appearance: Clear / S.015 / pH: x  Gluc: x / Ketone: Negative  / Bili: Negative / Urobili: Negative   Blood: x / Protein: 15 / Nitrite: Negative   Leuk Esterase: Trace / RBC: 0-2 /HPF / WBC 0-2   Sq Epi: x / Non Sq Epi: Few / Bacteria: x      LIVER FUNCTIONS - ( 05 Oct 2022 13:40 )  Alb: 3.8 g/dL / Pro: 7.5 g/dL / ALK PHOS: 69 U/L / ALT: 27 U/L / AST: 21 U/L / GGT: x                       I&O's Detail    05 Oct 2022 07:01  -  06 Oct 2022 07:00  --------------------------------------------------------  IN:  Total IN: 0 mL    OUT:    Voided (mL): 550 mL  Total OUT: 550 mL    Total NET: -550 mL             Patient is a 81y old  Female who presents with a chief complaint of Pneumonia (05 Oct 2022 16:45)    HPI:  81-year-old obese female past medical history previously diagnosied with DMII (no longer current), HTN, GERD, COPD not on home O2 (with history of Pulmonary emboli - S/P IVC filter - followed by GI bleed, ischemic colitis, shock, and prolonged respiratory failure), pAfib s/p ablation currently on Coumadin, Aplastic Anemia, CKD, presents to the ER with complaints of generalized weakness this morning. Patient presents with daughter who states that yesterday patient was at her baseline and cooked a large meal for a family gathering for the holiday. This morning she was on the toilet and unable to get up due to weakness. Patient was found to be febrile 103 at home. Daughter reports patient slightly more confused than usual which she tends to get when she has fevers. Patient denies nausea vomiting diarrhea hematuria dysuria. Patient denies chest pain shortness of breath and worsening cough, although she does have a baseline productive cough due to her underlying COPD. Denies any sick contacts and no one else in the home is having the same symptoms.  Patient is back at baseline mental status, A and O x3. able to answer questions appropriately    of note: patient has recent hospital stay for superimposed bacterial pneumonia early .    Renal consult called for KIMBERLY. History obtained from chart and patient. Pt known to me from previous admission and office.   i    PAST MEDICAL HISTORY:  COPD (chronic obstructive pulmonary disease)    DM (diabetes mellitus)    Pulmonary fibrosis    PAF (paroxysmal atrial fibrillation)    Hiatal hernia    GIB (gastrointestinal bleeding)    Pericarditis    Shoulder fracture, right    Hematoma        PAST SURGICAL HISTORY:  S/P hysterectomy    S/P foot surgery    S/P knee surgery    After cataract    Closed right hip fracture    S/P IVC filter    S/P carpal tunnel release        FAMILY HISTORY:  Family history of bladder cancer (Mother)    Family history of myocardial infarction (Father)        SOCIAL HISTORY: No smoking or alcohol use     Allergies    No Known Allergies    Intolerances      Home Medications:  amiodarone 100 mg oral tablet: 1 tab(s) orally once a day (05 Oct 2022 16:37)  Bentyl 10 mg oral capsule: 1 cap(s) orally 2 times a day, As Needed (05 Oct 2022 16:37)  Cardizem: 25 milligram(s) orally 2 times a day (05 Oct 2022 16:37)  Coumadin 4 mg oral tablet: 1 tab(s) orally once a day  4mg 4x week; 6mg 3x week (05 Oct 2022 16:37)  folic acid 1 mg oral tablet: 1 tab(s) orally once a day (05 Oct 2022 16:37)  gabapentin 400 mg oral capsule: 1 cap(s) orally 2 times a day (05 Oct 2022 16:37)  NexIUM: 15 milligram(s) orally once a day (05 Oct 2022 16:37)  Procrit 10,000 units/mL preservative-free injectable solution: injectable once a week (05 Oct 2022 16:37)  simvastatin 10 mg oral tablet: 1 tab(s) orally once a day (at bedtime) (05 Oct 2022 16:37)  Singulair 10 mg oral tablet: 1 tab(s) orally once a day (05 Oct 2022 16:37)  traMADol 50 mg oral tablet: 1 tab(s) orally 2 times a day (05 Oct 2022 16:37)  Trelegy Ellipta: inhaled once a day (05 Oct 2022 16:37)    MEDICATIONS  (STANDING):  aMIOdarone    Tablet 100 milliGRAM(s) Oral daily  azithromycin   Tablet 500 milliGRAM(s) Oral daily  budesonide  80 MICROgram(s)/formoterol 4.5 MICROgram(s) Inhaler 2 Puff(s) Inhalation two times a day  cefTRIAXone   IVPB 1000 milliGRAM(s) IV Intermittent every 24 hours  dextrose 5%. 1000 milliLiter(s) (50 mL/Hr) IV Continuous <Continuous>  dextrose 5%. 1000 milliLiter(s) (100 mL/Hr) IV Continuous <Continuous>  dextrose 50% Injectable 25 Gram(s) IV Push once  dextrose 50% Injectable 12.5 Gram(s) IV Push once  dextrose 50% Injectable 25 Gram(s) IV Push once  diltiazem    Tablet 30 milliGRAM(s) Oral two times a day  folic acid 1 milliGRAM(s) Oral daily  gabapentin 400 milliGRAM(s) Oral two times a day  glucagon  Injectable 1 milliGRAM(s) IntraMuscular once  insulin lispro (ADMELOG) corrective regimen sliding scale   SubCutaneous three times a day before meals  insulin lispro (ADMELOG) corrective regimen sliding scale   SubCutaneous at bedtime  montelukast 10 milliGRAM(s) Oral daily  pantoprazole    Tablet 40 milliGRAM(s) Oral before breakfast  simvastatin 10 milliGRAM(s) Oral at bedtime  tiotropium 18 MICROgram(s) Capsule 1 Capsule(s) Inhalation daily  traMADol 50 milliGRAM(s) Oral two times a day    MEDICATIONS  (PRN):  acetaminophen     Tablet .. 650 milliGRAM(s) Oral every 6 hours PRN Temp greater or equal to 38C (100.4F), Mild Pain (1 - 3)  aluminum hydroxide/magnesium hydroxide/simethicone Suspension 30 milliLiter(s) Oral every 4 hours PRN Dyspepsia  dextrose Oral Gel 15 Gram(s) Oral once PRN Blood Glucose LESS THAN 70 milliGRAM(s)/deciliter  dicyclomine 10 milliGRAM(s) Oral two times a day before meals PRN Constipation  melatonin 3 milliGRAM(s) Oral at bedtime PRN Insomnia  ondansetron Injectable 4 milliGRAM(s) IV Push every 8 hours PRN Nausea and/or Vomiting      REVIEW OF SYSTEMS:  General: no distress  Respiratory: No cough, SOB  Cardiovascular: No CP or Palpitations	  Gastrointestinal: No nausea, Vomiting. No diarrhea  Genitourinary: No urinary complaints	  Musculoskeletal: No new rash or lesions		  all other systems negative    T(F): 99.3 (10-06-22 @ 05:00), Max: 103.8 (10-05-22 @ 13:19)  HR: 80 (10-06-22 @ 05:00) (71 - 81)  BP: 124/70 (10-06-22 @ 05:00) (109/50 - 129/74)  RR: 18 (10-06-22 @ 05:00) (16 - 18)  SpO2: 93% (10-06-22 @ 05:00) (91% - 96%)  Wt(kg): --    PHYSICAL EXAM:  General: NAD  Respiratory: b/l air entry  Cardiovascular: S1 S2  Gastrointestinal: soft  Extremities: + edema        10-05    137  |  105  |  37<H>  ----------------------------<  116<H>  5.1   |  27  |  1.70<H>    Ca    9.5      05 Oct 2022 13:40    TPro  7.5  /  Alb  3.8  /  TBili  1.1  /  DBili  x   /  AST  21  /  ALT  27  /  AlkPhos  69  10-05                          9.1    9.88  )-----------( 226      ( 05 Oct 2022 13:40 )             28.7       Hemoglobin: 9.1 g/dL (10-05 @ 13:40)  Hematocrit: 28.7 % (10-05 @ 13:40)  Calcium, Total Serum: 9.5 mg/dL (10-05 @ 13:40)  Potassium, Serum: 5.1 mmol/L (10-05 @ 13:40)      Creatinine, Serum: 1.70 (10-05 @ 13:40)      Urinalysis Basic - ( 05 Oct 2022 15:30 )    Color: Yellow / Appearance: Clear / S.015 / pH: x  Gluc: x / Ketone: Negative  / Bili: Negative / Urobili: Negative   Blood: x / Protein: 15 / Nitrite: Negative   Leuk Esterase: Trace / RBC: 0-2 /HPF / WBC 0-2   Sq Epi: x / Non Sq Epi: Few / Bacteria: x      LIVER FUNCTIONS - ( 05 Oct 2022 13:40 )  Alb: 3.8 g/dL / Pro: 7.5 g/dL / ALK PHOS: 69 U/L / ALT: 27 U/L / AST: 21 U/L / GGT: x               I&O's Detail    05 Oct 2022 07:01  -  06 Oct 2022 07:00  --------------------------------------------------------  IN:  Total IN: 0 mL    OUT:    Voided (mL): 550 mL  Total OUT: 550 mL    Total NET: -550 mL        < from: Xray Chest 1 View-PORTABLE IMMEDIATE (10.05.22 @ 13:45) >    ACC: [id number] EXAM:  XR CHEST PORTABLE IMMED 1V                          PROCEDURE DATE:  10/05/2022          INTERPRETATION:  INDICATION: Sepsis    COMPARISON: 2022    FINDINGS:  An AP portable semiupright view of the chest demonstrates ill-defined   density in the right lower thorax however this may represent a summation   density from the overlying right breast. Although no air bronchograms are   seen, follow-up is recommended to exclude an evolving right lower lobe   infiltrate. The lungs are otherwise clear. There is no pneumothorax.   There are no pleural effusions. There is no hilar or mediastinal   widening. Heart size is magnified by technique. There is no pulmonary   edema. The bony thorax is grossly intact.    IMPRESSION:  1. Ill-defined increased density in the right lower lobe may be due to   the overlying breast shadow however follow-up is recommended to help   exclude an evolving right lower lobe infiltrate in this region.  2. No other significant findings.    --- End of Report ---            SEKOU ROLON MD; Attending Radiologist  This document has been electronically signed. Oct  5 2022  1:49PM    < end of copied text >

## 2022-10-06 NOTE — PROGRESS NOTE ADULT - PROBLEM SELECTOR PLAN 2
Pt with CKD (G3a), baseline GFR ~42, Cr 1.2-1.3 at baseline per chart review  - improving  - Monitor BMP  - Avoid nephrotoxic medications  - Monitor renal indices Pt with CKD (G3a), baseline GFR ~42, Cr 1.2-1.3 at baseline per chart review  - improving at 1.4  - Monitor BMP  - Avoid nephrotoxic medications  - Monitor renal indices

## 2022-10-06 NOTE — PROGRESS NOTE ADULT - PROBLEM SELECTOR PLAN 3
Pt follows Dr. Jun Oneil for chronic pain management  -Tramadol 50mg bid  -Gabapentin 400mg bid (given patients current GFR, max dose is 900mg per day)

## 2022-10-06 NOTE — CONSULT NOTE ADULT - TIME BILLING
Thank you for your consult.   Please call us with any concerns or questions.   We will continue to follow.     Barron Carter MD   Division of Infectious Diseases  [facility]   Cell [phone] between 8am and 6pm   After 6pm and weekends please call ID service at [phone].

## 2022-10-06 NOTE — PROGRESS NOTE ADULT - SUBJECTIVE AND OBJECTIVE BOX
Patient is a 81y old  Female who presents with a chief complaint of Pneumonia (06 Oct 2022 09:39)      INTERVAL HPI/OVERNIGHT EVENTS: Pt seen and examined bedside, has no complaints. Pt feels better, less weak, no CP or SOB. Has cough with yellow phlegm.    MEDICATIONS  (STANDING):  aMIOdarone    Tablet 100 milliGRAM(s) Oral daily  azithromycin   Tablet 500 milliGRAM(s) Oral daily  budesonide  80 MICROgram(s)/formoterol 4.5 MICROgram(s) Inhaler 2 Puff(s) Inhalation two times a day  cefTRIAXone   IVPB 1000 milliGRAM(s) IV Intermittent every 24 hours  dextrose 5%. 1000 milliLiter(s) (50 mL/Hr) IV Continuous <Continuous>  dextrose 5%. 1000 milliLiter(s) (100 mL/Hr) IV Continuous <Continuous>  dextrose 50% Injectable 25 Gram(s) IV Push once  dextrose 50% Injectable 12.5 Gram(s) IV Push once  dextrose 50% Injectable 25 Gram(s) IV Push once  diltiazem    Tablet 30 milliGRAM(s) Oral two times a day  folic acid 1 milliGRAM(s) Oral daily  gabapentin 400 milliGRAM(s) Oral two times a day  glucagon  Injectable 1 milliGRAM(s) IntraMuscular once  insulin lispro (ADMELOG) corrective regimen sliding scale   SubCutaneous three times a day before meals  insulin lispro (ADMELOG) corrective regimen sliding scale   SubCutaneous at bedtime  montelukast 10 milliGRAM(s) Oral daily  pantoprazole    Tablet 40 milliGRAM(s) Oral before breakfast  simvastatin 10 milliGRAM(s) Oral at bedtime  tiotropium 18 MICROgram(s) Capsule 1 Capsule(s) Inhalation daily  traMADol 50 milliGRAM(s) Oral two times a day    MEDICATIONS  (PRN):  acetaminophen     Tablet .. 650 milliGRAM(s) Oral every 6 hours PRN Temp greater or equal to 38C (100.4F), Mild Pain (1 - 3)  aluminum hydroxide/magnesium hydroxide/simethicone Suspension 30 milliLiter(s) Oral every 4 hours PRN Dyspepsia  dextrose Oral Gel 15 Gram(s) Oral once PRN Blood Glucose LESS THAN 70 milliGRAM(s)/deciliter  dicyclomine 10 milliGRAM(s) Oral two times a day before meals PRN Constipation  melatonin 3 milliGRAM(s) Oral at bedtime PRN Insomnia  ondansetron Injectable 4 milliGRAM(s) IV Push every 8 hours PRN Nausea and/or Vomiting      Allergies    No Known Allergies    Intolerances        REVIEW OF SYSTEMS:  CONSTITUTIONAL: No fever or chills  HEENT:  No headache, no sore throat  RESPIRATORY: No cough, wheezing, or shortness of breath  CARDIOVASCULAR: No chest pain, palpitations, or leg swelling  GASTROINTESTINAL: No abd pain, nausea, vomiting, or diarrhea  GENITOURINARY: No dysuria, frequency, or hematuria  NEUROLOGICAL: no focal weakness or dizziness  MUSCULOSKELETAL: no myalgias     Vital Signs Last 24 Hrs  T(C): 37.4 (06 Oct 2022 05:00), Max: 39.9 (05 Oct 2022 13:19)  T(F): 99.3 (06 Oct 2022 05:00), Max: 103.8 (05 Oct 2022 13:19)  HR: 80 (06 Oct 2022 05:00) (71 - 81)  BP: 124/70 (06 Oct 2022 05:00) (109/50 - 129/74)  BP(mean): --  RR: 18 (06 Oct 2022 05:00) (16 - 18)  SpO2: 93% (06 Oct 2022 05:00) (91% - 96%)    Parameters below as of 06 Oct 2022 05:00  Patient On (Oxygen Delivery Method): room air        PHYSICAL EXAM:  GENERAL: NAD  HEENT:  EOMI, moist mucous membranes  CHEST/LUNG:  CTA b/l, no rales, wheezes, or rhonchi  HEART:  RRR, S1, S2  ABDOMEN:  BS+, soft, nontender, nondistended  EXTREMITIES: no edema, cyanosis, or calf tenderness  NERVOUS SYSTEM: AA&Ox3    LABS:                        7.6    15.81 )-----------( 196      ( 06 Oct 2022 07:30 )             24.4     CBC Full  -  ( 06 Oct 2022 07:30 )  WBC Count : 15.81 K/uL  Hemoglobin : 7.6 g/dL  Hematocrit : 24.4 %  Platelet Count - Automated : 196 K/uL  Mean Cell Volume : 102.5 fl  Mean Cell Hemoglobin : 31.9 pg  Mean Cell Hemoglobin Concentration : 31.1 gm/dL  Auto Neutrophil # : 12.54 K/uL  Auto Lymphocyte # : 1.56 K/uL  Auto Monocyte # : 1.24 K/uL  Auto Eosinophil # : 0.00 K/uL  Auto Basophil # : 0.02 K/uL  Auto Neutrophil % : 79.4 %  Auto Lymphocyte % : 9.9 %  Auto Monocyte % : 7.8 %  Auto Eosinophil % : 0.0 %  Auto Basophil % : 0.1 %    06 Oct 2022 07:30    138    |  105    |  32     ----------------------------<  84     4.3     |  26     |  1.40     Ca    9.0        06 Oct 2022 07:30    TPro  6.3    /  Alb  2.9    /  TBili  1.2    /  DBili  x      /  AST  13     /  ALT  20     /  AlkPhos  60     06 Oct 2022 07:30    PT/INR - ( 06 Oct 2022 07:30 )   PT: 28.5 sec;   INR: 2.41 ratio         PTT - ( 05 Oct 2022 13:40 )  PTT:37.0 sec  Urinalysis Basic - ( 05 Oct 2022 15:30 )    Color: Yellow / Appearance: Clear / S.015 / pH: x  Gluc: x / Ketone: Negative  / Bili: Negative / Urobili: Negative   Blood: x / Protein: 15 / Nitrite: Negative   Leuk Esterase: Trace / RBC: 0-2 /HPF / WBC 0-2   Sq Epi: x / Non Sq Epi: Few / Bacteria: x      CAPILLARY BLOOD GLUCOSE      POCT Blood Glucose.: 104 mg/dL (06 Oct 2022 08:00)  POCT Blood Glucose.: 66 mg/dL (06 Oct 2022 07:55)  POCT Blood Glucose.: 101 mg/dL (05 Oct 2022 22:11)          RADIOLOGY & ADDITIONAL TESTS:    Personally reviewed.     Consultant(s) Notes Reviewed:  [x] YES  [ ] NO    Care Discussed with [x] Consultants  [x] Patient  [ ] Family  [ ]      [ ] Other; RN  DVT ppx

## 2022-10-07 LAB
A1C WITH ESTIMATED AVERAGE GLUCOSE RESULT: 5.2 % — SIGNIFICANT CHANGE UP (ref 4–5.6)
ANION GAP SERPL CALC-SCNC: 10 MMOL/L — SIGNIFICANT CHANGE UP (ref 5–17)
BLD GP AB SCN SERPL QL: SIGNIFICANT CHANGE UP
BUN SERPL-MCNC: 30 MG/DL — HIGH (ref 7–23)
CALCIUM SERPL-MCNC: 8.6 MG/DL — SIGNIFICANT CHANGE UP (ref 8.5–10.1)
CHLORIDE SERPL-SCNC: 106 MMOL/L — SIGNIFICANT CHANGE UP (ref 96–108)
CO2 SERPL-SCNC: 24 MMOL/L — SIGNIFICANT CHANGE UP (ref 22–31)
CREAT SERPL-MCNC: 1.6 MG/DL — HIGH (ref 0.5–1.3)
EGFR: 33 ML/MIN/1.73M2 — LOW
ESTIMATED AVERAGE GLUCOSE: 103 MG/DL — SIGNIFICANT CHANGE UP (ref 68–114)
GLUCOSE SERPL-MCNC: 98 MG/DL — SIGNIFICANT CHANGE UP (ref 70–99)
HCT VFR BLD CALC: 23.9 % — LOW (ref 34.5–45)
HGB BLD-MCNC: 7.5 G/DL — LOW (ref 11.5–15.5)
INR BLD: 2.3 RATIO — HIGH (ref 0.88–1.16)
LACTATE SERPL-SCNC: 1.3 MMOL/L — SIGNIFICANT CHANGE UP (ref 0.7–2)
LDH SERPL L TO P-CCNC: 247 U/L — HIGH (ref 50–242)
MCHC RBC-ENTMCNC: 31.4 GM/DL — LOW (ref 32–36)
MCHC RBC-ENTMCNC: 32.3 PG — SIGNIFICANT CHANGE UP (ref 27–34)
MCV RBC AUTO: 103 FL — HIGH (ref 80–100)
NRBC # BLD: 0 /100 WBCS — SIGNIFICANT CHANGE UP (ref 0–0)
PLATELET # BLD AUTO: 221 K/UL — SIGNIFICANT CHANGE UP (ref 150–400)
POTASSIUM SERPL-MCNC: 4.2 MMOL/L — SIGNIFICANT CHANGE UP (ref 3.5–5.3)
POTASSIUM SERPL-SCNC: 4.2 MMOL/L — SIGNIFICANT CHANGE UP (ref 3.5–5.3)
PROTHROM AB SERPL-ACNC: 27.1 SEC — HIGH (ref 10.5–13.4)
RBC # BLD: 2.32 M/UL — LOW (ref 3.8–5.2)
RBC # FLD: 23.9 % — HIGH (ref 10.3–14.5)
SODIUM SERPL-SCNC: 140 MMOL/L — SIGNIFICANT CHANGE UP (ref 135–145)
WBC # BLD: 16.92 K/UL — HIGH (ref 3.8–10.5)
WBC # FLD AUTO: 16.92 K/UL — HIGH (ref 3.8–10.5)

## 2022-10-07 PROCEDURE — 99233 SBSQ HOSP IP/OBS HIGH 50: CPT

## 2022-10-07 PROCEDURE — 99232 SBSQ HOSP IP/OBS MODERATE 35: CPT

## 2022-10-07 RX ORDER — ACETAMINOPHEN 500 MG
650 TABLET ORAL ONCE
Refills: 0 | Status: COMPLETED | OUTPATIENT
Start: 2022-10-07 | End: 2022-10-07

## 2022-10-07 RX ORDER — WARFARIN SODIUM 2.5 MG/1
4 TABLET ORAL ONCE
Refills: 0 | Status: DISCONTINUED | OUTPATIENT
Start: 2022-10-07 | End: 2022-10-07

## 2022-10-07 RX ORDER — WARFARIN SODIUM 2.5 MG/1
6 TABLET ORAL ONCE
Refills: 0 | Status: COMPLETED | OUTPATIENT
Start: 2022-10-07 | End: 2022-10-07

## 2022-10-07 RX ORDER — DIPHENHYDRAMINE HCL 50 MG
25 CAPSULE ORAL ONCE
Refills: 0 | Status: COMPLETED | OUTPATIENT
Start: 2022-10-07 | End: 2022-10-07

## 2022-10-07 RX ADMIN — Medication 650 MILLIGRAM(S): at 15:23

## 2022-10-07 RX ADMIN — CEFTRIAXONE 100 MILLIGRAM(S): 500 INJECTION, POWDER, FOR SOLUTION INTRAMUSCULAR; INTRAVENOUS at 18:13

## 2022-10-07 RX ADMIN — BUDESONIDE AND FORMOTEROL FUMARATE DIHYDRATE 2 PUFF(S): 160; 4.5 AEROSOL RESPIRATORY (INHALATION) at 06:34

## 2022-10-07 RX ADMIN — AMIODARONE HYDROCHLORIDE 100 MILLIGRAM(S): 400 TABLET ORAL at 05:53

## 2022-10-07 RX ADMIN — Medication 25 MILLIGRAM(S): at 15:23

## 2022-10-07 RX ADMIN — WARFARIN SODIUM 6 MILLIGRAM(S): 2.5 TABLET ORAL at 20:13

## 2022-10-07 RX ADMIN — GABAPENTIN 400 MILLIGRAM(S): 400 CAPSULE ORAL at 20:12

## 2022-10-07 RX ADMIN — TIOTROPIUM BROMIDE 1 CAPSULE(S): 18 CAPSULE ORAL; RESPIRATORY (INHALATION) at 06:34

## 2022-10-07 RX ADMIN — Medication 650 MILLIGRAM(S): at 15:45

## 2022-10-07 RX ADMIN — BUDESONIDE AND FORMOTEROL FUMARATE DIHYDRATE 2 PUFF(S): 160; 4.5 AEROSOL RESPIRATORY (INHALATION) at 22:50

## 2022-10-07 RX ADMIN — AZITHROMYCIN 500 MILLIGRAM(S): 500 TABLET, FILM COATED ORAL at 11:38

## 2022-10-07 RX ADMIN — Medication 1 MILLIGRAM(S): at 11:37

## 2022-10-07 RX ADMIN — TRAMADOL HYDROCHLORIDE 50 MILLIGRAM(S): 50 TABLET ORAL at 20:12

## 2022-10-07 RX ADMIN — PANTOPRAZOLE SODIUM 40 MILLIGRAM(S): 20 TABLET, DELAYED RELEASE ORAL at 05:55

## 2022-10-07 RX ADMIN — SIMVASTATIN 10 MILLIGRAM(S): 20 TABLET, FILM COATED ORAL at 20:13

## 2022-10-07 RX ADMIN — GABAPENTIN 400 MILLIGRAM(S): 400 CAPSULE ORAL at 05:53

## 2022-10-07 RX ADMIN — TRAMADOL HYDROCHLORIDE 50 MILLIGRAM(S): 50 TABLET ORAL at 21:12

## 2022-10-07 RX ADMIN — Medication 30 MILLIGRAM(S): at 05:53

## 2022-10-07 RX ADMIN — TRAMADOL HYDROCHLORIDE 50 MILLIGRAM(S): 50 TABLET ORAL at 05:53

## 2022-10-07 RX ADMIN — MONTELUKAST 10 MILLIGRAM(S): 4 TABLET, CHEWABLE ORAL at 11:38

## 2022-10-07 NOTE — PROGRESS NOTE ADULT - ASSESSMENT
Prerenal azotemia, CKD 3a  Pneumonia  Diabetes  Hypertension  h/o CHF  Anemia      Mild increase in creatinine trend. Encourage Po intake. On abx. To  continue current meds. Monitor blood sugar levels. Insulin coverage as needed. Dietary restriction.   Monitor BP trend. Titrate BP meds as needed. Hematology follow up. Will follow electrolytes and renal function trend.

## 2022-10-07 NOTE — PROGRESS NOTE ADULT - PROBLEM SELECTOR PLAN 4
Pt with history of COPD  - Stable  - Continue home medications  - Trelegy Ellipta therapeutic interchange in hospital  - Singulair 10 mg qd  - Encourage incentive spirometry  - Monitor respiratory status   - RVP neg  - no home Oxygen use  - pulm (El) consulted, recs appreciated

## 2022-10-07 NOTE — PROGRESS NOTE ADULT - SUBJECTIVE AND OBJECTIVE BOX
Patient is a 81y old  Female who presents with a chief complaint of Pneumonia (07 Oct 2022 09:47)      INTERVAL HPI/OVERNIGHT EVENTS: Patient seen and examined at bedside. No overnight events. Tolerating 3L NC. Denies fever, chills, chest pain, shortness of breath, abdominal pain, nausea/vomiting, headache.    MEDICATIONS  (STANDING):  aMIOdarone    Tablet 100 milliGRAM(s) Oral daily  azithromycin   Tablet 500 milliGRAM(s) Oral daily  budesonide  80 MICROgram(s)/formoterol 4.5 MICROgram(s) Inhaler 2 Puff(s) Inhalation two times a day  cefTRIAXone   IVPB 1000 milliGRAM(s) IV Intermittent every 24 hours  dextrose 5%. 1000 milliLiter(s) (50 mL/Hr) IV Continuous <Continuous>  dextrose 5%. 1000 milliLiter(s) (100 mL/Hr) IV Continuous <Continuous>  dextrose 50% Injectable 25 Gram(s) IV Push once  dextrose 50% Injectable 12.5 Gram(s) IV Push once  dextrose 50% Injectable 25 Gram(s) IV Push once  diltiazem    Tablet 30 milliGRAM(s) Oral two times a day  folic acid 1 milliGRAM(s) Oral daily  gabapentin 400 milliGRAM(s) Oral two times a day  glucagon  Injectable 1 milliGRAM(s) IntraMuscular once  insulin lispro (ADMELOG) corrective regimen sliding scale   SubCutaneous three times a day before meals  insulin lispro (ADMELOG) corrective regimen sliding scale   SubCutaneous at bedtime  montelukast 10 milliGRAM(s) Oral daily  pantoprazole    Tablet 40 milliGRAM(s) Oral before breakfast  simvastatin 10 milliGRAM(s) Oral at bedtime  tiotropium 18 MICROgram(s) Capsule 1 Capsule(s) Inhalation daily  traMADol 50 milliGRAM(s) Oral two times a day    MEDICATIONS  (PRN):  acetaminophen     Tablet .. 650 milliGRAM(s) Oral every 6 hours PRN Temp greater or equal to 38C (100.4F), Mild Pain (1 - 3)  aluminum hydroxide/magnesium hydroxide/simethicone Suspension 30 milliLiter(s) Oral every 4 hours PRN Dyspepsia  dextrose Oral Gel 15 Gram(s) Oral once PRN Blood Glucose LESS THAN 70 milliGRAM(s)/deciliter  dicyclomine 10 milliGRAM(s) Oral two times a day before meals PRN Constipation  melatonin 3 milliGRAM(s) Oral at bedtime PRN Insomnia  ondansetron Injectable 4 milliGRAM(s) IV Push every 8 hours PRN Nausea and/or Vomiting      Allergies    No Known Allergies    Intolerances        REVIEW OF SYSTEMS:  CONSTITUTIONAL: No fever or chills  HEENT:  No headache, no sore throat  RESPIRATORY: No cough, wheezing, or shortness of breath  CARDIOVASCULAR: No chest pain, palpitations  GASTROINTESTINAL: No abd pain, nausea, vomiting, or diarrhea  GENITOURINARY: No dysuria, frequency, or hematuria  NEUROLOGICAL: no focal weakness or dizziness  MUSCULOSKELETAL: no myalgias     Vital Signs Last 24 Hrs  T(C): 36.7 (07 Oct 2022 05:03), Max: 39.2 (06 Oct 2022 20:24)  T(F): 98.1 (07 Oct 2022 05:03), Max: 102.6 (06 Oct 2022 20:24)  HR: 73 (07 Oct 2022 05:03) (73 - 84)  BP: 115/62 (07 Oct 2022 05:03) (115/62 - 147/65)  BP(mean): --  RR: 18 (07 Oct 2022 05:03) (18 - 18)  SpO2: 92% (07 Oct 2022 10:27) (85% - 93%)    Parameters below as of 07 Oct 2022 10:27  Patient On (Oxygen Delivery Method): nasal cannula  O2 Flow (L/min): 3      PHYSICAL EXAM:  GENERAL: NAD. on NC  HEENT:  anicteric, moist mucous membranes  CHEST/LUNG:  CTA b/l, no rales, wheezes, or rhonchi  HEART:  RRR, S1, S2  ABDOMEN:  BS+, soft, nontender, nondistended  EXTREMITIES: no edema, cyanosis, or calf tenderness  NERVOUS SYSTEM: AAO x3    LABS:                        7.5    16.92 )-----------( 221      ( 07 Oct 2022 06:50 )             23.9     CBC Full  -  ( 07 Oct 2022 06:50 )  WBC Count : 16.92 K/uL  Hemoglobin : 7.5 g/dL  Hematocrit : 23.9 %  Platelet Count - Automated : 221 K/uL  Mean Cell Volume : 103.0 fl  Mean Cell Hemoglobin : 32.3 pg  Mean Cell Hemoglobin Concentration : 31.4 gm/dL  Auto Neutrophil # : x  Auto Lymphocyte # : x  Auto Monocyte # : x  Auto Eosinophil # : x  Auto Basophil # : x  Auto Neutrophil % : x  Auto Lymphocyte % : x  Auto Monocyte % : x  Auto Eosinophil % : x  Auto Basophil % : x    07 Oct 2022 06:50    140    |  106    |  30     ----------------------------<  98     4.2     |  24     |  1.60     Ca    8.6        07 Oct 2022 06:50      PT/INR - ( 07 Oct 2022 06:50 )   PT: 27.1 sec;   INR: 2.30 ratio         PTT - ( 05 Oct 2022 13:40 )  PTT:37.0 sec  Urinalysis Basic - ( 05 Oct 2022 15:30 )    Color: Yellow / Appearance: Clear / S.015 / pH: x  Gluc: x / Ketone: Negative  / Bili: Negative / Urobili: Negative   Blood: x / Protein: 15 / Nitrite: Negative   Leuk Esterase: Trace / RBC: 0-2 /HPF / WBC 0-2   Sq Epi: x / Non Sq Epi: Few / Bacteria: x      CAPILLARY BLOOD GLUCOSE      POCT Blood Glucose.: 106 mg/dL (07 Oct 2022 07:32)  POCT Blood Glucose.: 115 mg/dL (06 Oct 2022 21:48)  POCT Blood Glucose.: 111 mg/dL (06 Oct 2022 16:43)  POCT Blood Glucose.: 98 mg/dL (06 Oct 2022 11:55)        Culture - Urine (collected 10-05-22 @ 15:30)  Source: Clean Catch Clean Catch (Midstream)  Final Report (10-06-22 @ 18:54):    <10,000 CFU/mL Normal Urogenital Ginger    Culture - Blood (collected 10-05-22 @ 13:40)  Source: .Blood Blood-Peripheral  Preliminary Report (10-06-22 @ 19:01):    No growth to date.    Culture - Blood (collected 10-05-22 @ 13:25)  Source: .Blood Blood-Peripheral  Preliminary Report (10-06-22 @ 19:01):    No growth to date.        RADIOLOGY & ADDITIONAL TESTS:    Personally reviewed.     Consultant(s) Notes Reviewed:  [x] YES  [ ] NO

## 2022-10-07 NOTE — PROGRESS NOTE ADULT - PROBLEM SELECTOR PLAN 6
Pt with history of aplastic anemia, follows Dr. Parker outpatient  -Procrit 10 injection weekly with heme  -folic acid 1mg qd  -monitor blood counts daily  -transfuse if Hg <7.0  -Heme (Dr. Parker) consulted, recs appreciated: hold MIRIAM while hospitalized and will resume as an outpatient

## 2022-10-07 NOTE — PROGRESS NOTE ADULT - PROBLEM SELECTOR PLAN 1
Weakness due to community acquired pnemonia.  -confusion resolved after her fever resolved.   -Rocephin  1 g IV daily with azithromycin 500 mg IV daily  -CXR: 1. Ill-defined increased density in the right lower lobe may be due to the overlying breast shadow however follow-up is recommended to help exclude an evolving right lower lobe infiltrate in this region. 2. No other significant findings.   -CT chest: Right lower lobe consolidation compatible with pneumonia. Recommend CT chest follow-up to ensure clearing. Small right pleural effusion.  -Had IVIG last month as per patient; no urgent need while acutely infected   -F/u procal and sputum culture   -Heme (Dr. Parker) consulted, recs appreciated: hold MIRIAM while hospitalized and will resume as an outpatient  -ID (Dr. Carter) consulted, recs appreciated  -Pulconcha Gallegos consulted, recs appreciated  -Pt with history of with history of Pulmonary emboli - S/P IVC filter

## 2022-10-07 NOTE — PROGRESS NOTE ADULT - SUBJECTIVE AND OBJECTIVE BOX
[facility]  INFECTIOUS DISEASES   [address] [place] [address]  Tel: [phone]     Fax: [phone]  ======================================================  MD Emma Archer Kaushal, MD Cho, Michelle, MD   ======================================================    Assessment/Recommendations:       81-year-old  female with past medical history of chronic bronchitis, pulmonary embolism s/p IVC filter, paroxysmal A. fib with chronic Coumadin, IgG deficiency, CKD, dyslipidemia presented with confusion and generalized weakness being admitted for acute metabolic encephalopathy, acute respiratory distress/Failure with right lower lobe community-acquired pneumonia (with history of pneumonia/parainfluenza in April 2022)    Overnight fever noted and also with minimal increase in WBC; but HDS  Blood culture x2: October 5: Pending  Urine culture: October 5: Pending  Pending urine Legionella and Streptococcus pneumonia antigen  Ordered for sputum culture and procalcitonin  Rapid viral panel including SARS-CoV-2 PCR  negative on admission  CT chest noted: No previous CT chest for comparison    Agree with continuation of Rocephin  1 g IV daily with azithromycin 500 mg IV daily  Daily CBC with differential, renal function with electrolytes while inpatient and on antibiotics   Had IvIg last month as per patient; no urgent need while acutely infected     other comorbid condition management as per primary team/pulmonary      Plan explained to patient   D/w Primary team     ________________________________    Last 24 hours:   Overnight fever >102  HDS  Overnight required supplemental O2  Persistent greenish phlegm with cough     Further ROS:  All systems reviewed. No other complaints besides mentioned above     ______________________________  Allergies    No Known Allergies    ANTIBIOTICS/RELEVANT:  antimicrobials  azithromycin   Tablet 500 milliGRAM(s) Oral daily  cefTRIAXone   IVPB 1000 milliGRAM(s) IV Intermittent every 24 hours    immunologic:    OTHER:  acetaminophen     Tablet .. 650 milliGRAM(s) Oral every 6 hours PRN  aluminum hydroxide/magnesium hydroxide/simethicone Suspension 30 milliLiter(s) Oral every 4 hours PRN  aMIOdarone    Tablet 100 milliGRAM(s) Oral daily  budesonide  80 MICROgram(s)/formoterol 4.5 MICROgram(s) Inhaler 2 Puff(s) Inhalation two times a day  dextrose 5%. 1000 milliLiter(s) IV Continuous <Continuous>  dextrose 5%. 1000 milliLiter(s) IV Continuous <Continuous>  dextrose 50% Injectable 25 Gram(s) IV Push once  dextrose 50% Injectable 12.5 Gram(s) IV Push once  dextrose 50% Injectable 25 Gram(s) IV Push once  dextrose Oral Gel 15 Gram(s) Oral once PRN  dicyclomine 10 milliGRAM(s) Oral two times a day before meals PRN  diltiazem    Tablet 30 milliGRAM(s) Oral two times a day  folic acid 1 milliGRAM(s) Oral daily  gabapentin 400 milliGRAM(s) Oral two times a day  glucagon  Injectable 1 milliGRAM(s) IntraMuscular once  insulin lispro (ADMELOG) corrective regimen sliding scale   SubCutaneous three times a day before meals  insulin lispro (ADMELOG) corrective regimen sliding scale   SubCutaneous at bedtime  melatonin 3 milliGRAM(s) Oral at bedtime PRN  montelukast 10 milliGRAM(s) Oral daily  ondansetron Injectable 4 milliGRAM(s) IV Push every 8 hours PRN  pantoprazole    Tablet 40 milliGRAM(s) Oral before breakfast  simvastatin 10 milliGRAM(s) Oral at bedtime  tiotropium 18 MICROgram(s) Capsule 1 Capsule(s) Inhalation daily  traMADol 50 milliGRAM(s) Oral two times a day      _________________    PHYSICAL EXAM:    Objective:  Vital Signs Last 24 Hrs  T(C): 36.7 (07 Oct 2022 05:03), Max: 39.2 (06 Oct 2022 20:24)  T(F): 98.1 (07 Oct 2022 05:03), Max: 102.6 (06 Oct 2022 20:24)  HR: 73 (07 Oct 2022 05:03) (73 - 84)  BP: 115/62 (07 Oct 2022 05:03) (115/62 - 147/65)  BP(mean): --  RR: 18 (07 Oct 2022 05:03) (18 - 18)  SpO2: 91% (07 Oct 2022 05:03) (86% - 93%)    Parameters below as of 07 Oct 2022 05:03  Patient On (Oxygen Delivery Method): nasal cannula    General:  morbidly obese female, well kempt, No acute distress while lying in bed    Neuro: AAO*4, No obvious acute motor deficit  HEENT: Pupils equal, reactive to light, Oral mucosa moist  PULM:  decreased air entry bilaterally right more than left with no appreciable adventitious breath sounds  CVS: Irregular rhythm and controlled rate  ABD: Soft, nondistended, nontender, normoactive bowel sounds, no CVA tenderness  EXT: No b/l LE edema, nontender with pedal pulse palpable   SKIN: Warm and well perfused, no acute rashes   NO obvious palpable lymphadenopathy     ______________  LABS, MICROBIOLOGY, RADIOLOGY & ADDITIONAL STUDIES: Reviewed

## 2022-10-07 NOTE — PROGRESS NOTE ADULT - PROBLEM SELECTOR PLAN 2
Pt with CKD (G3a), baseline GFR ~42, Cr 1.2-1.3 at baseline per chart review  - Cr 1.6 today  - Monitor BMP  - Avoid nephrotoxic medications  - Monitor renal indices

## 2022-10-07 NOTE — PROGRESS NOTE ADULT - PROBLEM SELECTOR PLAN 5
Pt with history of pAfib s/p ablation on Coumadin   - Daily INR checks  - Dose Coumadin in hospital daily. Give 4mg tonight  - Home regimen: Coumadin 4mg Tuesday/Wednesday/Thursday/Sunday + Coumadin 6mg Monday/Friday/Saturday  - Amiodarone 100mg qd

## 2022-10-07 NOTE — PROGRESS NOTE ADULT - SUBJECTIVE AND OBJECTIVE BOX
[facility] -- Sai Valle Pannella, Patel, Savella [facility]  Office # [phone]      Follow Up:    afib htn   Subjective/Observations:   No events overnight resting comfortably in bed.  No complaints of chest pain, dyspnea, or palpitations reported. No signs of orthopnea or PND.  remains on nasal cannula     REVIEW OF SYSTEMS: All other review of systems is negative unless indicated above    PAST MEDICAL & SURGICAL HISTORY:  COPD (chronic obstructive pulmonary disease)      DM (diabetes mellitus)      PAF (paroxysmal atrial fibrillation)  s/p ablation      Hiatal hernia      GIB (gastrointestinal bleeding)      S/P hysterectomy      S/P foot surgery      S/P knee surgery      After cataract  bilateral eye cataract surgically removed      Closed right hip fracture  rigth hip ORIF 2016      S/P IVC filter  2016      S/P carpal tunnel release  Right  &amp; 17          MEDICATIONS  (STANDING):  aMIOdarone    Tablet 100 milliGRAM(s) Oral daily  azithromycin   Tablet 500 milliGRAM(s) Oral daily  budesonide  80 MICROgram(s)/formoterol 4.5 MICROgram(s) Inhaler 2 Puff(s) Inhalation two times a day  cefTRIAXone   IVPB 1000 milliGRAM(s) IV Intermittent every 24 hours  dextrose 5%. 1000 milliLiter(s) (50 mL/Hr) IV Continuous <Continuous>  dextrose 5%. 1000 milliLiter(s) (100 mL/Hr) IV Continuous <Continuous>  dextrose 50% Injectable 25 Gram(s) IV Push once  dextrose 50% Injectable 12.5 Gram(s) IV Push once  dextrose 50% Injectable 25 Gram(s) IV Push once  diltiazem    Tablet 30 milliGRAM(s) Oral two times a day  folic acid 1 milliGRAM(s) Oral daily  gabapentin 400 milliGRAM(s) Oral two times a day  glucagon  Injectable 1 milliGRAM(s) IntraMuscular once  insulin lispro (ADMELOG) corrective regimen sliding scale   SubCutaneous three times a day before meals  insulin lispro (ADMELOG) corrective regimen sliding scale   SubCutaneous at bedtime  montelukast 10 milliGRAM(s) Oral daily  pantoprazole    Tablet 40 milliGRAM(s) Oral before breakfast  simvastatin 10 milliGRAM(s) Oral at bedtime  tiotropium 18 MICROgram(s) Capsule 1 Capsule(s) Inhalation daily  traMADol 50 milliGRAM(s) Oral two times a day    MEDICATIONS  (PRN):  acetaminophen     Tablet .. 650 milliGRAM(s) Oral every 6 hours PRN Temp greater or equal to 38C (100.4F), Mild Pain (1 - 3)  aluminum hydroxide/magnesium hydroxide/simethicone Suspension 30 milliLiter(s) Oral every 4 hours PRN Dyspepsia  dextrose Oral Gel 15 Gram(s) Oral once PRN Blood Glucose LESS THAN 70 milliGRAM(s)/deciliter  dicyclomine 10 milliGRAM(s) Oral two times a day before meals PRN Constipation  melatonin 3 milliGRAM(s) Oral at bedtime PRN Insomnia  ondansetron Injectable 4 milliGRAM(s) IV Push every 8 hours PRN Nausea and/or Vomiting      Allergies    No Known Allergies    Intolerances        Vital Signs Last 24 Hrs  T(C): 36.7 (07 Oct 2022 05:03), Max: 39.2 (06 Oct 2022 20:24)  T(F): 98.1 (07 Oct 2022 05:03), Max: 102.6 (06 Oct 2022 20:24)  HR: 73 (07 Oct 2022 05:03) (73 - 84)  BP: 115/62 (07 Oct 2022 05:03) (115/62 - 147/65)  BP(mean): --  RR: 18 (07 Oct 2022 05:03) (18 - 18)  SpO2: 91% (07 Oct 2022 05:03) (86% - 93%)    Parameters below as of 07 Oct 2022 05:03  Patient On (Oxygen Delivery Method): nasal cannula        I&O's Summary    06 Oct 2022 07:01  -  07 Oct 2022 07:00  --------------------------------------------------------  IN: 0 mL / OUT: 700 mL / NET: -700 mL          PHYSICAL EXAM:  TELE:    Constitutional: NAD, awake and alert, obese   HEENT: Moist Mucous Membranes, Anicteric  Pulmonary: Non-labored, breath sounds are diminished   Cardiovascular: Regular, S1 and S2 nl, No murmurs, rubs, gallops or clicks  Gastrointestinal: Bowel Sounds present, soft, nontender.   Lymph: No lymphadenopathy. No peripheral edema.  Skin: No visible rashes or ulcers.  Psych:  Mood & affect appropriate    LABS: All Labs Reviewed:                        7.5    16.92 )-----------( 221      ( 07 Oct 2022 06:50 )             23.9                         7.6    15.81 )-----------( 196      ( 06 Oct 2022 07:30 )             24.4                         9.1    9.88  )-----------( 226      ( 05 Oct 2022 13:40 )             28.7     07 Oct 2022 06:50    140    |  106    |  30     ----------------------------<  98     4.2     |  24     |  1.60   06 Oct 2022 07:30    138    |  105    |  32     ----------------------------<  84     4.3     |  26     |  1.40   05 Oct 2022 13:40    137    |  105    |  37     ----------------------------<  116    5.1     |  27     |  1.70     Ca    8.6        07 Oct 2022 06:50  Ca    9.0        06 Oct 2022 07:30  Ca    9.5        05 Oct 2022 13:40    TPro  6.3    /  Alb  2.9    /  TBili  1.2    /  DBili  x      /  AST  13     /  ALT  20     /  AlkPhos  60     06 Oct 2022 07:30  TPro  7.5    /  Alb  3.8    /  TBili  1.1    /  DBili  x      /  AST  21     /  ALT  27     /  AlkPhos  69     05 Oct 2022 13:40    PT/INR - ( 07 Oct 2022 06:50 )   PT: 27.1 sec;   INR: 2.30 ratio         PTT - ( 05 Oct 2022 13:40 )  PTT:37.0 sec         EC Lead ECG:   Ventricular Rate 79 BPM    Atrial Rate 79 BPM    P-R Interval 136 ms    QRS Duration 78 ms    Q-T Interval 384 ms    QTC Calculation(Bazett) 440 ms    P Axis 85 degrees    R Axis -31 degrees    T Axis 72 degrees    Diagnosis Line Normal sinus rhythm  Left axis deviation  Confirmed by DAVID MELGOZA ([id number]) on 10/5/2022 5:32:59 PM (10-05-22 @ 13:06)       EXAM:  ECHO TTE W/O CON COMP W/DOPPLR           PROCEDURE DATE:  2016      INTERPRETATION:  Ordering Physician: LIA MOLINA    Indication: Syncope    Study Quality: Technically difficult and limited     Height: 167 cm  Weight: 91 kg  BSA: 2.0 sq m  Blood Pressure: 104/70    MEASUREMENTS  IVS: 1.4cm  PWT: 1.4cm  LA: 4.4cm  AO: 3.6cm  LVIDd: 4.6cm  LVIDs: 2.4cm    FINDINGS  Left Ventricle: The endocardium is not well-visualized. Grossly, there   appears to be normal left ventricular systolic function.  Aortic Valve: Calcified trileaflet aortic valve with normal opening. No   significant aortic insufficiency.  Mitral Valve: Mitral annular calcification and calcified mitral valve   leaflets. Mild mitral insufficiency.  Tricuspid Valve: Not well visualized. Grossly normal. Mild tricuspid   insufficiency.  Pulmonic Valve: Not well visualized. Mild pulmonic insufficiency.  Left Atrium: Enlarged  Right Ventricle: Normal right ventricular size and systolic function.  Right Atrium: Enlarged  Diastolic function: Grade 1 diastolic dysfunction.

## 2022-10-07 NOTE — PROGRESS NOTE ADULT - ASSESSMENT
Patient with anemia multifactorial in anemia secondary to renal insufficiency, myelodysplasia now complicated by  admission with pneumonia  patient is MIRIAM dependent and does require occasional transfusion    Recommendations:  1.  follow CBC  2.  transfuse 1 unit PRBC  3.  to hold MIRIAM while hospitalized and will resume as an outpatient  4.  continue present abx  5.  further hematologic recommendations pending above  discussed with patient

## 2022-10-07 NOTE — PROGRESS NOTE ADULT - ASSESSMENT
81-year-old obese female past medical history previously diagnosied with DMII (no longer current), HTN, GERD, COPD not on home O2, pAfib s/p ablation currently on Coumadin, Aplastic Anemia, CKD, HFpEF admitted for pneumonia, on IV Rocephin and Azithro.

## 2022-10-07 NOTE — PROGRESS NOTE ADULT - SUBJECTIVE AND OBJECTIVE BOX
Patient is a 81y old  Female who presents with a chief complaint of Pneumonia (07 Oct 2022 12:23)      INTERVAL HPI/OVERNIGHT EVENTS:    continues to have cough with sputum production and low grade fever    MEDICATIONS  (STANDING):  aMIOdarone    Tablet 100 milliGRAM(s) Oral daily  azithromycin   Tablet 500 milliGRAM(s) Oral daily  budesonide  80 MICROgram(s)/formoterol 4.5 MICROgram(s) Inhaler 2 Puff(s) Inhalation two times a day  cefTRIAXone   IVPB 1000 milliGRAM(s) IV Intermittent every 24 hours  dextrose 5%. 1000 milliLiter(s) (50 mL/Hr) IV Continuous <Continuous>  dextrose 5%. 1000 milliLiter(s) (100 mL/Hr) IV Continuous <Continuous>  dextrose 50% Injectable 25 Gram(s) IV Push once  dextrose 50% Injectable 12.5 Gram(s) IV Push once  dextrose 50% Injectable 25 Gram(s) IV Push once  diltiazem    Tablet 30 milliGRAM(s) Oral two times a day  folic acid 1 milliGRAM(s) Oral daily  gabapentin 400 milliGRAM(s) Oral two times a day  glucagon  Injectable 1 milliGRAM(s) IntraMuscular once  insulin lispro (ADMELOG) corrective regimen sliding scale   SubCutaneous three times a day before meals  insulin lispro (ADMELOG) corrective regimen sliding scale   SubCutaneous at bedtime  montelukast 10 milliGRAM(s) Oral daily  pantoprazole    Tablet 40 milliGRAM(s) Oral before breakfast  simvastatin 10 milliGRAM(s) Oral at bedtime  tiotropium 18 MICROgram(s) Capsule 1 Capsule(s) Inhalation daily  traMADol 50 milliGRAM(s) Oral two times a day  warfarin 6 milliGRAM(s) Oral once      MEDICATIONS  (PRN):  acetaminophen     Tablet .. 650 milliGRAM(s) Oral every 6 hours PRN Temp greater or equal to 38C (100.4F), Mild Pain (1 - 3)  aluminum hydroxide/magnesium hydroxide/simethicone Suspension 30 milliLiter(s) Oral every 4 hours PRN Dyspepsia  dextrose Oral Gel 15 Gram(s) Oral once PRN Blood Glucose LESS THAN 70 milliGRAM(s)/deciliter  dicyclomine 10 milliGRAM(s) Oral two times a day before meals PRN Constipation  melatonin 3 milliGRAM(s) Oral at bedtime PRN Insomnia  ondansetron Injectable 4 milliGRAM(s) IV Push every 8 hours PRN Nausea and/or Vomiting      Allergies    No Known Allergies    Intolerances        PAST MEDICAL & SURGICAL HISTORY:  COPD (chronic obstructive pulmonary disease)      DM (diabetes mellitus)      PAF (paroxysmal atrial fibrillation)  s/p ablation      Hiatal hernia      GIB (gastrointestinal bleeding)      S/P hysterectomy      S/P foot surgery      S/P knee surgery      After cataract  bilateral eye cataract surgically removed      Closed right hip fracture  rigth hip ORIF july 19 2016      S/P IVC filter  nov 2016      S/P carpal tunnel release  Right 2008 &amp; 6/27/17          Vital Signs Last 24 Hrs  T(C): 37.1 (07 Oct 2022 18:02), Max: 39.2 (06 Oct 2022 20:24)  T(F): 98.7 (07 Oct 2022 18:02), Max: 102.6 (06 Oct 2022 20:24)  HR: 79 (07 Oct 2022 18:02) (69 - 84)  BP: 100/63 (07 Oct 2022 18:02) (100/58 - 115/68)  BP(mean): --  RR: 22 (07 Oct 2022 18:02) (18 - 22)  SpO2: 94% (07 Oct 2022 18:02) (85% - 95%)    Parameters below as of 07 Oct 2022 18:02  Patient On (Oxygen Delivery Method): nasal cannula  O2 Flow (L/min): 3      PHYSICAL EXAMINATION:    GENERAL: The patient is awake and alert in no apparent distress.     HEENT: Head is normocephalic and atraumatic.    NECK: no JVD    LUNGS: rales right base and scattered bilateral rhonchi    HEART: Regular rate and rhythm without murmur.    ABDOMEN: Soft, nontender, and nondistended.      EXTREMITIES: Without any cyanosis, clubbing, rash, lesions or edema.    NEUROLOGIC: Grossly intact.    SKIN: No ulceration or induration present.      LABS:                        7.5    16.92 )-----------( 221      ( 07 Oct 2022 06:50 )             23.9     10-07    140  |  106  |  30<H>  ----------------------------<  98  4.2   |  24  |  1.60<H>    Ca    8.6      07 Oct 2022 06:50    TPro  6.3  /  Alb  2.9<L>  /  TBili  1.2  /  DBili  x   /  AST  13<L>  /  ALT  20  /  AlkPhos  60  10-06    PT/INR - ( 07 Oct 2022 06:50 )   PT: 27.1 sec;   INR: 2.30 ratio                       Lactate, Blood: 1.3 mmol/L (10-07-22 @ 08:05)    Procalcitonin, Serum: 1.04 ng/mL (10-06-22 @ 07:30)      MICROBIOLOGY:  Culture Results:   <10,000 CFU/mL Normal Urogenital Ginger (10-05 @ 15:30)  Culture Results:   No growth to date. (10-05 @ 13:40)  Culture Results:   No growth to date. (10-05 @ 13:25)          Assessment:    Right lower lobe Pneumonia  COPD  Hx Pulmonary emboli - S/P DVT  PAF  Diabetes    Plan:    Supplemental oxygen and follow pulse oximetry  IV Rocephin + Zithromax  Spiriva + Symbicort

## 2022-10-07 NOTE — PROGRESS NOTE ADULT - ASSESSMENT
81-year-old obese female past medical history previously diagnosied with DMII (no longer current), HTN, GERD, COPD not on home O2, pAfib s/p ablation currently on Coumadin, Aplastic Anemia, CKD, HFpEF admitted for pneumonia workup      HTN Pafib Heart Failure   Admitted with Right lower lobe PNA  - tele SR 73 no events overnight , can DC tele   - Metoprolol recently d/c'd outpatient by Dr. Stout secondary to episodes of dizziness   - Continue home amiodarone 100 mg PO qd and Cardizem 25 mg BID   - Continue home coumadin Goal INR 2-3   - No meaningful signs of volume overload on exam   - Echo (4/2021) showed EF 57%   - Strict I/Os, daily weights.  -Anemia followed by hem onc   -CKD follow up primary   - Monitor and replete lytes, keep K>4, Mg>2.  - Other cardiovascular workup will depend on clinical course.  Amanda Ruiz FNP-C  Cardiology NP  [id number] [phone]

## 2022-10-07 NOTE — PROGRESS NOTE ADULT - SUBJECTIVE AND OBJECTIVE BOX
Patient is a 81y old  Female who presents with a chief complaint of Pneumonia (07 Oct 2022 11:21)    Patient seen in follow up for       PAST MEDICAL HISTORY:  COPD (chronic obstructive pulmonary disease)    DM (diabetes mellitus)    Pulmonary fibrosis    PAF (paroxysmal atrial fibrillation)    Hiatal hernia    GIB (gastrointestinal bleeding)    Pericarditis    Shoulder fracture, right    Hematoma      MEDICATIONS  (STANDING):  acetaminophen     Tablet .. 650 milliGRAM(s) Oral once  aMIOdarone    Tablet 100 milliGRAM(s) Oral daily  azithromycin   Tablet 500 milliGRAM(s) Oral daily  budesonide  80 MICROgram(s)/formoterol 4.5 MICROgram(s) Inhaler 2 Puff(s) Inhalation two times a day  cefTRIAXone   IVPB 1000 milliGRAM(s) IV Intermittent every 24 hours  dextrose 5%. 1000 milliLiter(s) (50 mL/Hr) IV Continuous <Continuous>  dextrose 5%. 1000 milliLiter(s) (100 mL/Hr) IV Continuous <Continuous>  dextrose 50% Injectable 25 Gram(s) IV Push once  dextrose 50% Injectable 12.5 Gram(s) IV Push once  dextrose 50% Injectable 25 Gram(s) IV Push once  diltiazem    Tablet 30 milliGRAM(s) Oral two times a day  diphenhydrAMINE 25 milliGRAM(s) Oral once  folic acid 1 milliGRAM(s) Oral daily  gabapentin 400 milliGRAM(s) Oral two times a day  glucagon  Injectable 1 milliGRAM(s) IntraMuscular once  insulin lispro (ADMELOG) corrective regimen sliding scale   SubCutaneous three times a day before meals  insulin lispro (ADMELOG) corrective regimen sliding scale   SubCutaneous at bedtime  montelukast 10 milliGRAM(s) Oral daily  pantoprazole    Tablet 40 milliGRAM(s) Oral before breakfast  simvastatin 10 milliGRAM(s) Oral at bedtime  tiotropium 18 MICROgram(s) Capsule 1 Capsule(s) Inhalation daily  traMADol 50 milliGRAM(s) Oral two times a day  warfarin 6 milliGRAM(s) Oral once    MEDICATIONS  (PRN):  acetaminophen     Tablet .. 650 milliGRAM(s) Oral every 6 hours PRN Temp greater or equal to 38C (100.4F), Mild Pain (1 - 3)  aluminum hydroxide/magnesium hydroxide/simethicone Suspension 30 milliLiter(s) Oral every 4 hours PRN Dyspepsia  dextrose Oral Gel 15 Gram(s) Oral once PRN Blood Glucose LESS THAN 70 milliGRAM(s)/deciliter  dicyclomine 10 milliGRAM(s) Oral two times a day before meals PRN Constipation  melatonin 3 milliGRAM(s) Oral at bedtime PRN Insomnia  ondansetron Injectable 4 milliGRAM(s) IV Push every 8 hours PRN Nausea and/or Vomiting    T(C): 37.9 (10-07-22 @ 12:01), Max: 39.2 (10-06-22 @ 20:24)  HR: 69 (10-07-22 @ 12:01) (69 - 84)  BP: 114/56 (10-07-22 @ 12:01) (114/56 - 147/65)  RR: 20 (10-07-22 @ 12:01) (18 - 20)  SpO2: 95% (10-07-22 @ 12:01) (85% - 95%)  Wt(kg): --  I&O's Detail    06 Oct 2022 07:01  -  07 Oct 2022 07:00  --------------------------------------------------------  IN:  Total IN: 0 mL    OUT:    Voided (mL): 700 mL  Total OUT: 700 mL    Total NET: -700 mL          PHYSICAL EXAM:  General: No distress  Respiratory: b/l air entry  Cardiovascular: S1 S2  Gastrointestinal: soft  Extremities:  edema                              7.5    16.92 )-----------( 221      ( 07 Oct 2022 06:50 )             23.9     10-07    140  |  106  |  30<H>  ----------------------------<  98  4.2   |  24  |  1.60<H>    Ca    8.6      07 Oct 2022 06:50    TPro  6.3  /  Alb  2.9<L>  /  TBili  1.2  /  DBili  x   /  AST  13<L>  /  ALT  20  /  AlkPhos  60  10-06        LIVER FUNCTIONS - ( 06 Oct 2022 07:30 )  Alb: 2.9 g/dL / Pro: 6.3 g/dL / ALK PHOS: 60 U/L / ALT: 20 U/L / AST: 13 U/L / GGT: x           Urinalysis Basic - ( 05 Oct 2022 15:30 )    Color: Yellow / Appearance: Clear / S.015 / pH: x  Gluc: x / Ketone: Negative  / Bili: Negative / Urobili: Negative   Blood: x / Protein: 15 / Nitrite: Negative   Leuk Esterase: Trace / RBC: 0-2 /HPF / WBC 0-2   Sq Epi: x / Non Sq Epi: Few / Bacteria: x        Sodium, Serum: 140 (10-07 @ 06:50)  Sodium, Serum: 138 (10-06 @ 07:30)  Sodium, Serum: 137 (10-05 @ 13:40)    Creatinine, Serum: 1.60 (10-07 @ 06:50)  Creatinine, Serum: 1.40 (10-06 @ 07:30)  Creatinine, Serum: 1.70 (10-05 @ 13:40)    Potassium, Serum: 4.2 (10-07 @ 06:50)  Potassium, Serum: 4.3 (10-06 @ 07:30)  Potassium, Serum: 5.1 (10-05 @ 13:40)    Hemoglobin: 7.5 (10-07 @ 06:50)  Hemoglobin: 7.6 (10-06 @ 07:30)  Hemoglobin: 9.1 (10-05 @ 13:40)

## 2022-10-07 NOTE — PROGRESS NOTE ADULT - SUBJECTIVE AND OBJECTIVE BOX
Interval History:  still with shortness of breath    Chart reviewed and events noted;   Overnight events:    MEDICATIONS  (STANDING):  acetaminophen     Tablet .. 650 milliGRAM(s) Oral once  aMIOdarone    Tablet 100 milliGRAM(s) Oral daily  azithromycin   Tablet 500 milliGRAM(s) Oral daily  budesonide  80 MICROgram(s)/formoterol 4.5 MICROgram(s) Inhaler 2 Puff(s) Inhalation two times a day  cefTRIAXone   IVPB 1000 milliGRAM(s) IV Intermittent every 24 hours  dextrose 5%. 1000 milliLiter(s) (50 mL/Hr) IV Continuous <Continuous>  dextrose 5%. 1000 milliLiter(s) (100 mL/Hr) IV Continuous <Continuous>  dextrose 50% Injectable 25 Gram(s) IV Push once  dextrose 50% Injectable 12.5 Gram(s) IV Push once  dextrose 50% Injectable 25 Gram(s) IV Push once  diltiazem    Tablet 30 milliGRAM(s) Oral two times a day  diphenhydrAMINE 25 milliGRAM(s) Oral once  folic acid 1 milliGRAM(s) Oral daily  gabapentin 400 milliGRAM(s) Oral two times a day  glucagon  Injectable 1 milliGRAM(s) IntraMuscular once  insulin lispro (ADMELOG) corrective regimen sliding scale   SubCutaneous three times a day before meals  insulin lispro (ADMELOG) corrective regimen sliding scale   SubCutaneous at bedtime  montelukast 10 milliGRAM(s) Oral daily  pantoprazole    Tablet 40 milliGRAM(s) Oral before breakfast  simvastatin 10 milliGRAM(s) Oral at bedtime  tiotropium 18 MICROgram(s) Capsule 1 Capsule(s) Inhalation daily  traMADol 50 milliGRAM(s) Oral two times a day  warfarin 6 milliGRAM(s) Oral once    MEDICATIONS  (PRN):  acetaminophen     Tablet .. 650 milliGRAM(s) Oral every 6 hours PRN Temp greater or equal to 38C (100.4F), Mild Pain (1 - 3)  aluminum hydroxide/magnesium hydroxide/simethicone Suspension 30 milliLiter(s) Oral every 4 hours PRN Dyspepsia  dextrose Oral Gel 15 Gram(s) Oral once PRN Blood Glucose LESS THAN 70 milliGRAM(s)/deciliter  dicyclomine 10 milliGRAM(s) Oral two times a day before meals PRN Constipation  melatonin 3 milliGRAM(s) Oral at bedtime PRN Insomnia  ondansetron Injectable 4 milliGRAM(s) IV Push every 8 hours PRN Nausea and/or Vomiting      Vital Signs Last 24 Hrs  T(C): 36.7 (07 Oct 2022 05:03), Max: 39.2 (06 Oct 2022 20:24)  T(F): 98.1 (07 Oct 2022 05:03), Max: 102.6 (06 Oct 2022 20:24)  HR: 73 (07 Oct 2022 05:03) (73 - 84)  BP: 115/62 (07 Oct 2022 05:03) (115/62 - 147/65)  BP(mean): --  RR: 18 (07 Oct 2022 05:03) (18 - 18)  SpO2: 92% (07 Oct 2022 10:27) (85% - 93%)    Parameters below as of 07 Oct 2022 10:27  Patient On (Oxygen Delivery Method): nasal cannula  O2 Flow (L/min): 3      PHYSICAL EXAM  General: adult in NAD  HEENT: clear oropharynx, anicteric sclera, pink conjunctivae  Neck: supple  CV: normal S1S2 with no murmur rubs or gallops  Lungs: clear to auscultation, no wheezes, no rhales  Abdomen: soft non-tender non-distended, no hepato/splenomegaly  Ext: no clubbing cyanosis or edema  Skin: no rashes and no petichiae  Neuro: alert and oriented X3 no focal deficits      LABS:  CBC Full  -  ( 07 Oct 2022 06:50 )  WBC Count : 16.92 K/uL  RBC Count : 2.32 M/uL  Hemoglobin : 7.5 g/dL  Hematocrit : 23.9 %  Platelet Count - Automated : 221 K/uL  Mean Cell Volume : 103.0 fl  Mean Cell Hemoglobin : 32.3 pg  Mean Cell Hemoglobin Concentration : 31.4 gm/dL  Auto Neutrophil # : x  Auto Lymphocyte # : x  Auto Monocyte # : x  Auto Eosinophil # : x  Auto Basophil # : x  Auto Neutrophil % : x  Auto Lymphocyte % : x  Auto Monocyte % : x  Auto Eosinophil % : x  Auto Basophil % : x    10-07    140  |  106  |  30<H>  ----------------------------<  98  4.2   |  24  |  1.60<H>    Ca    8.6      07 Oct 2022 06:50    TPro  6.3  /  Alb  2.9<L>  /  TBili  1.2  /  DBili  x   /  AST  13<L>  /  ALT  20  /  AlkPhos  60  10-06    PT/INR - ( 07 Oct 2022 06:50 )   PT: 27.1 sec;   INR: 2.30 ratio         PTT - ( 05 Oct 2022 13:40 )  PTT:37.0 sec    fe studies      WBC trend  16.92 K/uL (10-07-22 @ 06:50)  15.81 K/uL (10-06-22 @ 07:30)  9.88 K/uL (10-05-22 @ 13:40)      Hgb trend  7.5 g/dL (10-07-22 @ 06:50)  7.6 g/dL (10-06-22 @ 07:30)  9.1 g/dL (10-05-22 @ 13:40)      plt trend  221 K/uL (10-07-22 @ 06:50)  196 K/uL (10-06-22 @ 07:30)  226 K/uL (10-05-22 @ 13:40)        RADIOLOGY & ADDITIONAL STUDIES:

## 2022-10-08 DIAGNOSIS — K56.609 UNSPECIFIED INTESTINAL OBSTRUCTION, UNSPECIFIED AS TO PARTIAL VERSUS COMPLETE OBSTRUCTION: ICD-10-CM

## 2022-10-08 DIAGNOSIS — R10.9 UNSPECIFIED ABDOMINAL PAIN: ICD-10-CM

## 2022-10-08 LAB
ANION GAP SERPL CALC-SCNC: 8 MMOL/L — SIGNIFICANT CHANGE UP (ref 5–17)
APPEARANCE UR: CLEAR — SIGNIFICANT CHANGE UP
BASOPHILS # BLD AUTO: 0.03 K/UL — SIGNIFICANT CHANGE UP (ref 0–0.2)
BASOPHILS NFR BLD AUTO: 0.2 % — SIGNIFICANT CHANGE UP (ref 0–2)
BILIRUB UR-MCNC: NEGATIVE — SIGNIFICANT CHANGE UP
BUN SERPL-MCNC: 35 MG/DL — HIGH (ref 7–23)
CALCIUM SERPL-MCNC: 9.3 MG/DL — SIGNIFICANT CHANGE UP (ref 8.5–10.1)
CHLORIDE SERPL-SCNC: 107 MMOL/L — SIGNIFICANT CHANGE UP (ref 96–108)
CO2 SERPL-SCNC: 24 MMOL/L — SIGNIFICANT CHANGE UP (ref 22–31)
COLOR SPEC: YELLOW — SIGNIFICANT CHANGE UP
CREAT SERPL-MCNC: 1.6 MG/DL — HIGH (ref 0.5–1.3)
DIFF PNL FLD: ABNORMAL
EGFR: 33 ML/MIN/1.73M2 — LOW
EOSINOPHIL # BLD AUTO: 0.01 K/UL — SIGNIFICANT CHANGE UP (ref 0–0.5)
EOSINOPHIL NFR BLD AUTO: 0.1 % — SIGNIFICANT CHANGE UP (ref 0–6)
GLUCOSE SERPL-MCNC: 114 MG/DL — HIGH (ref 70–99)
GLUCOSE UR QL: NEGATIVE — SIGNIFICANT CHANGE UP
GRAM STN FLD: SIGNIFICANT CHANGE UP
HCT VFR BLD CALC: 28.2 % — LOW (ref 34.5–45)
HGB BLD-MCNC: 9.1 G/DL — LOW (ref 11.5–15.5)
IMM GRANULOCYTES NFR BLD AUTO: 5.4 % — HIGH (ref 0–0.9)
INR BLD: 2.68 RATIO — HIGH (ref 0.88–1.16)
KETONES UR-MCNC: NEGATIVE — SIGNIFICANT CHANGE UP
LEUKOCYTE ESTERASE UR-ACNC: NEGATIVE — SIGNIFICANT CHANGE UP
LYMPHOCYTES # BLD AUTO: 0.86 K/UL — LOW (ref 1–3.3)
LYMPHOCYTES # BLD AUTO: 5.6 % — LOW (ref 13–44)
MCHC RBC-ENTMCNC: 31.4 PG — SIGNIFICANT CHANGE UP (ref 27–34)
MCHC RBC-ENTMCNC: 32.3 GM/DL — SIGNIFICANT CHANGE UP (ref 32–36)
MCV RBC AUTO: 97.2 FL — SIGNIFICANT CHANGE UP (ref 80–100)
MONOCYTES # BLD AUTO: 1.11 K/UL — HIGH (ref 0–0.9)
MONOCYTES NFR BLD AUTO: 7.3 % — SIGNIFICANT CHANGE UP (ref 2–14)
NEUTROPHILS # BLD AUTO: 12.42 K/UL — HIGH (ref 1.8–7.4)
NEUTROPHILS NFR BLD AUTO: 81.4 % — HIGH (ref 43–77)
NITRITE UR-MCNC: NEGATIVE — SIGNIFICANT CHANGE UP
NRBC # BLD: 0 /100 WBCS — SIGNIFICANT CHANGE UP (ref 0–0)
PH UR: 5 — SIGNIFICANT CHANGE UP (ref 5–8)
PLATELET # BLD AUTO: 302 K/UL — SIGNIFICANT CHANGE UP (ref 150–400)
POTASSIUM SERPL-MCNC: 4.7 MMOL/L — SIGNIFICANT CHANGE UP (ref 3.5–5.3)
POTASSIUM SERPL-SCNC: 4.7 MMOL/L — SIGNIFICANT CHANGE UP (ref 3.5–5.3)
PROCALCITONIN SERPL-MCNC: 0.69 NG/ML — HIGH
PROT UR-MCNC: 30 MG/DL
PROTHROM AB SERPL-ACNC: 31.7 SEC — HIGH (ref 10.5–13.4)
RBC # BLD: 2.9 M/UL — LOW (ref 3.8–5.2)
RBC # FLD: 24.9 % — HIGH (ref 10.3–14.5)
SODIUM SERPL-SCNC: 139 MMOL/L — SIGNIFICANT CHANGE UP (ref 135–145)
SP GR SPEC: 1.02 — SIGNIFICANT CHANGE UP (ref 1.01–1.02)
SPECIMEN SOURCE: SIGNIFICANT CHANGE UP
UROBILINOGEN FLD QL: NEGATIVE — SIGNIFICANT CHANGE UP
WBC # BLD: 15.25 K/UL — HIGH (ref 3.8–10.5)
WBC # FLD AUTO: 15.25 K/UL — HIGH (ref 3.8–10.5)

## 2022-10-08 PROCEDURE — 99232 SBSQ HOSP IP/OBS MODERATE 35: CPT

## 2022-10-08 PROCEDURE — 74018 RADEX ABDOMEN 1 VIEW: CPT | Mod: 26

## 2022-10-08 PROCEDURE — 71045 X-RAY EXAM CHEST 1 VIEW: CPT | Mod: 26

## 2022-10-08 PROCEDURE — 43753 TX GASTRO INTUB W/ASP: CPT

## 2022-10-08 PROCEDURE — 99233 SBSQ HOSP IP/OBS HIGH 50: CPT

## 2022-10-08 PROCEDURE — 74176 CT ABD & PELVIS W/O CONTRAST: CPT | Mod: 26

## 2022-10-08 RX ORDER — WARFARIN SODIUM 2.5 MG/1
6 TABLET ORAL ONCE
Refills: 0 | Status: DISCONTINUED | OUTPATIENT
Start: 2022-10-08 | End: 2022-10-08

## 2022-10-08 RX ORDER — SODIUM CHLORIDE 9 MG/ML
1000 INJECTION INTRAMUSCULAR; INTRAVENOUS; SUBCUTANEOUS
Refills: 0 | Status: DISCONTINUED | OUTPATIENT
Start: 2022-10-08 | End: 2022-10-09

## 2022-10-08 RX ORDER — HYDROMORPHONE HYDROCHLORIDE 2 MG/ML
0.5 INJECTION INTRAMUSCULAR; INTRAVENOUS; SUBCUTANEOUS ONCE
Refills: 0 | Status: DISCONTINUED | OUTPATIENT
Start: 2022-10-08 | End: 2022-10-08

## 2022-10-08 RX ORDER — ESOMEPRAZOLE MAGNESIUM 40 MG/1
20 CAPSULE, DELAYED RELEASE ORAL EVERY 24 HOURS
Refills: 0 | Status: DISCONTINUED | OUTPATIENT
Start: 2022-10-08 | End: 2022-10-08

## 2022-10-08 RX ORDER — POLYETHYLENE GLYCOL 3350 17 G/17G
17 POWDER, FOR SOLUTION ORAL ONCE
Refills: 0 | Status: COMPLETED | OUTPATIENT
Start: 2022-10-08 | End: 2022-10-08

## 2022-10-08 RX ORDER — INSULIN LISPRO 100/ML
VIAL (ML) SUBCUTANEOUS EVERY 6 HOURS
Refills: 0 | Status: DISCONTINUED | OUTPATIENT
Start: 2022-10-08 | End: 2022-10-11

## 2022-10-08 RX ORDER — FAMOTIDINE 10 MG/ML
20 INJECTION INTRAVENOUS
Refills: 0 | Status: DISCONTINUED | OUTPATIENT
Start: 2022-10-08 | End: 2022-10-08

## 2022-10-08 RX ORDER — DIATRIZOATE MEGLUMINE 180 MG/ML
100 INJECTION, SOLUTION INTRAVESICAL ONCE
Refills: 0 | Status: COMPLETED | OUTPATIENT
Start: 2022-10-09 | End: 2022-10-09

## 2022-10-08 RX ORDER — PANTOPRAZOLE SODIUM 20 MG/1
40 TABLET, DELAYED RELEASE ORAL
Refills: 0 | Status: DISCONTINUED | OUTPATIENT
Start: 2022-10-08 | End: 2022-10-15

## 2022-10-08 RX ORDER — WARFARIN SODIUM 2.5 MG/1
4 TABLET ORAL ONCE
Refills: 0 | Status: COMPLETED | OUTPATIENT
Start: 2022-10-08 | End: 2022-10-08

## 2022-10-08 RX ADMIN — ONDANSETRON 4 MILLIGRAM(S): 8 TABLET, FILM COATED ORAL at 07:06

## 2022-10-08 RX ADMIN — AMIODARONE HYDROCHLORIDE 100 MILLIGRAM(S): 400 TABLET ORAL at 05:49

## 2022-10-08 RX ADMIN — Medication 30 MILLILITER(S): at 00:04

## 2022-10-08 RX ADMIN — Medication 30 MILLIGRAM(S): at 05:49

## 2022-10-08 RX ADMIN — TRAMADOL HYDROCHLORIDE 50 MILLIGRAM(S): 50 TABLET ORAL at 17:24

## 2022-10-08 RX ADMIN — Medication 30 MILLIGRAM(S): at 17:24

## 2022-10-08 RX ADMIN — TRAMADOL HYDROCHLORIDE 50 MILLIGRAM(S): 50 TABLET ORAL at 06:31

## 2022-10-08 RX ADMIN — HYDROMORPHONE HYDROCHLORIDE 0.5 MILLIGRAM(S): 2 INJECTION INTRAMUSCULAR; INTRAVENOUS; SUBCUTANEOUS at 02:27

## 2022-10-08 RX ADMIN — CEFTRIAXONE 100 MILLIGRAM(S): 500 INJECTION, POWDER, FOR SOLUTION INTRAMUSCULAR; INTRAVENOUS at 20:11

## 2022-10-08 RX ADMIN — Medication 1 MILLIGRAM(S): at 12:51

## 2022-10-08 RX ADMIN — ONDANSETRON 4 MILLIGRAM(S): 8 TABLET, FILM COATED ORAL at 20:29

## 2022-10-08 RX ADMIN — MONTELUKAST 10 MILLIGRAM(S): 4 TABLET, CHEWABLE ORAL at 12:51

## 2022-10-08 RX ADMIN — AZITHROMYCIN 500 MILLIGRAM(S): 500 TABLET, FILM COATED ORAL at 12:51

## 2022-10-08 RX ADMIN — TRAMADOL HYDROCHLORIDE 50 MILLIGRAM(S): 50 TABLET ORAL at 17:48

## 2022-10-08 RX ADMIN — WARFARIN SODIUM 4 MILLIGRAM(S): 2.5 TABLET ORAL at 22:28

## 2022-10-08 RX ADMIN — HYDROMORPHONE HYDROCHLORIDE 0.5 MILLIGRAM(S): 2 INJECTION INTRAMUSCULAR; INTRAVENOUS; SUBCUTANEOUS at 02:44

## 2022-10-08 RX ADMIN — TRAMADOL HYDROCHLORIDE 50 MILLIGRAM(S): 50 TABLET ORAL at 05:48

## 2022-10-08 RX ADMIN — ESOMEPRAZOLE MAGNESIUM 20 MILLIGRAM(S): 40 CAPSULE, DELAYED RELEASE ORAL at 18:38

## 2022-10-08 RX ADMIN — PANTOPRAZOLE SODIUM 40 MILLIGRAM(S): 20 TABLET, DELAYED RELEASE ORAL at 05:48

## 2022-10-08 RX ADMIN — SODIUM CHLORIDE 50 MILLILITER(S): 9 INJECTION INTRAMUSCULAR; INTRAVENOUS; SUBCUTANEOUS at 21:12

## 2022-10-08 RX ADMIN — GABAPENTIN 400 MILLIGRAM(S): 400 CAPSULE ORAL at 17:24

## 2022-10-08 RX ADMIN — BUDESONIDE AND FORMOTEROL FUMARATE DIHYDRATE 2 PUFF(S): 160; 4.5 AEROSOL RESPIRATORY (INHALATION) at 07:08

## 2022-10-08 RX ADMIN — TIOTROPIUM BROMIDE 1 CAPSULE(S): 18 CAPSULE ORAL; RESPIRATORY (INHALATION) at 07:08

## 2022-10-08 RX ADMIN — SIMVASTATIN 10 MILLIGRAM(S): 20 TABLET, FILM COATED ORAL at 22:28

## 2022-10-08 RX ADMIN — GABAPENTIN 400 MILLIGRAM(S): 400 CAPSULE ORAL at 05:49

## 2022-10-08 NOTE — PROGRESS NOTE ADULT - ASSESSMENT
Patient with anemia multifactorial in anemia secondary to renal insufficiency, myelodysplasia now complicated by  admission with pneumonia  patient is MIRIAM dependent and does require occasional transfusion  s/p 1 unit PRBC with improvement in hgb 9.1    Recommendations:  1.  follow CBC  2.  to hold MIRIAM while hospitalized and will resume as an outpatient  3.  continue present abx  4.  further hematologic recommendations pending above  discussed with patient

## 2022-10-08 NOTE — PROGRESS NOTE ADULT - PROBLEM SELECTOR PLAN 1
Patient c/o abdominal pain, nausea, vomiting on 10/7  - F/u abdominal x-ray  - GI (Dr. Marquis) consulted, f/u recs Patient c/o abdominal pain, nausea, vomiting on 10/7  - Abdominal x-ray (10/8): suspicion for small bowel obstruction  - F/u CT a/p  - GI (Dr. Marquis) consulted, f/u recs  - Surgery consulted, f/u recs

## 2022-10-08 NOTE — PROGRESS NOTE ADULT - SUBJECTIVE AND OBJECTIVE BOX
Interval History:  complains of nausea treated with zofran  Chart reviewed and events noted;   Overnight events:    MEDICATIONS  (STANDING):  aMIOdarone    Tablet 100 milliGRAM(s) Oral daily  azithromycin   Tablet 500 milliGRAM(s) Oral daily  budesonide  80 MICROgram(s)/formoterol 4.5 MICROgram(s) Inhaler 2 Puff(s) Inhalation two times a day  cefTRIAXone   IVPB 1000 milliGRAM(s) IV Intermittent every 24 hours  dextrose 5%. 1000 milliLiter(s) (50 mL/Hr) IV Continuous <Continuous>  dextrose 5%. 1000 milliLiter(s) (100 mL/Hr) IV Continuous <Continuous>  dextrose 50% Injectable 25 Gram(s) IV Push once  dextrose 50% Injectable 12.5 Gram(s) IV Push once  dextrose 50% Injectable 25 Gram(s) IV Push once  diltiazem    Tablet 30 milliGRAM(s) Oral two times a day  folic acid 1 milliGRAM(s) Oral daily  gabapentin 400 milliGRAM(s) Oral two times a day  glucagon  Injectable 1 milliGRAM(s) IntraMuscular once  insulin lispro (ADMELOG) corrective regimen sliding scale   SubCutaneous three times a day before meals  insulin lispro (ADMELOG) corrective regimen sliding scale   SubCutaneous at bedtime  montelukast 10 milliGRAM(s) Oral daily  pantoprazole    Tablet 40 milliGRAM(s) Oral before breakfast  simvastatin 10 milliGRAM(s) Oral at bedtime  tiotropium 18 MICROgram(s) Capsule 1 Capsule(s) Inhalation daily  traMADol 50 milliGRAM(s) Oral two times a day    MEDICATIONS  (PRN):  acetaminophen     Tablet .. 650 milliGRAM(s) Oral every 6 hours PRN Temp greater or equal to 38C (100.4F), Mild Pain (1 - 3)  aluminum hydroxide/magnesium hydroxide/simethicone Suspension 30 milliLiter(s) Oral every 4 hours PRN Dyspepsia  dextrose Oral Gel 15 Gram(s) Oral once PRN Blood Glucose LESS THAN 70 milliGRAM(s)/deciliter  dicyclomine 10 milliGRAM(s) Oral two times a day before meals PRN Constipation  melatonin 3 milliGRAM(s) Oral at bedtime PRN Insomnia  ondansetron Injectable 4 milliGRAM(s) IV Push every 8 hours PRN Nausea and/or Vomiting      Vital Signs Last 24 Hrs  T(C): 36.8 (08 Oct 2022 05:43), Max: 37.9 (07 Oct 2022 12:01)  T(F): 98.3 (08 Oct 2022 05:43), Max: 100.3 (07 Oct 2022 12:01)  HR: 78 (08 Oct 2022 05:43) (69 - 82)  BP: 117/67 (08 Oct 2022 05:43) (100/58 - 120/70)  BP(mean): --  RR: 17 (08 Oct 2022 05:43) (17 - 22)  SpO2: 95% (08 Oct 2022 05:43) (93% - 95%)    Parameters below as of 08 Oct 2022 05:43  Patient On (Oxygen Delivery Method): nasal cannula  O2 Flow (L/min): 3      PHYSICAL EXAM  General: adult in NAD  HEENT: clear oropharynx, anicteric sclera, pink conjunctivae  Neck: supple  CV: normal S1S2 with no murmur rubs or gallops  Lungs: clear to auscultation, no wheezes, no rhales  Abdomen: soft non-tender non-distended, no hepato/splenomegaly  Ext: no clubbing cyanosis or edema  Skin: no rashes and no petichiae  Neuro: alert and oriented X3 no focal deficits      LABS:  CBC Full  -  ( 08 Oct 2022 07:52 )  WBC Count : 15.25 K/uL  RBC Count : 2.90 M/uL  Hemoglobin : 9.1 g/dL  Hematocrit : 28.2 %  Platelet Count - Automated : 302 K/uL  Mean Cell Volume : 97.2 fl  Mean Cell Hemoglobin : 31.4 pg  Mean Cell Hemoglobin Concentration : 32.3 gm/dL  Auto Neutrophil # : 12.42 K/uL  Auto Lymphocyte # : 0.86 K/uL  Auto Monocyte # : 1.11 K/uL  Auto Eosinophil # : 0.01 K/uL  Auto Basophil # : 0.03 K/uL  Auto Neutrophil % : 81.4 %  Auto Lymphocyte % : 5.6 %  Auto Monocyte % : 7.3 %  Auto Eosinophil % : 0.1 %  Auto Basophil % : 0.2 %    10-08    139  |  107  |  35<H>  ----------------------------<  114<H>  4.7   |  24  |  1.60<H>    Ca    9.3      08 Oct 2022 07:52      PT/INR - ( 08 Oct 2022 07:52 )   PT: 31.7 sec;   INR: 2.68 ratio             fe studies      WBC trend  15.25 K/uL (10-08-22 @ 07:52)  16.92 K/uL (10-07-22 @ 06:50)  15.81 K/uL (10-06-22 @ 07:30)  9.88 K/uL (10-05-22 @ 13:40)      Hgb trend  9.1 g/dL (10-08-22 @ 07:52)  7.5 g/dL (10-07-22 @ 06:50)  7.6 g/dL (10-06-22 @ 07:30)  9.1 g/dL (10-05-22 @ 13:40)      plt trend  302 K/uL (10-08-22 @ 07:52)  221 K/uL (10-07-22 @ 06:50)  196 K/uL (10-06-22 @ 07:30)  226 K/uL (10-05-22 @ 13:40)        RADIOLOGY & ADDITIONAL STUDIES:

## 2022-10-08 NOTE — PROGRESS NOTE ADULT - SUBJECTIVE AND OBJECTIVE BOX
[facility] - Tori Jones Grossman, Sai, Manuel, [place]  Office Number:  [phone]    Patient resting comfortably in bed in NAD.  Laying flat with no respiratory distress.  No complaints of chest pain, dyspnea, palpitations, PND, or orthopnea.  reports nausea/vomiting    ROS: negative unless otherwise mentioned.    Telemetry:  sr 70-80    MEDICATIONS  (STANDING):  aMIOdarone    Tablet 100 milliGRAM(s) Oral daily  azithromycin   Tablet 500 milliGRAM(s) Oral daily  budesonide  80 MICROgram(s)/formoterol 4.5 MICROgram(s) Inhaler 2 Puff(s) Inhalation two times a day  cefTRIAXone   IVPB 1000 milliGRAM(s) IV Intermittent every 24 hours  dextrose 5%. 1000 milliLiter(s) (50 mL/Hr) IV Continuous <Continuous>  dextrose 5%. 1000 milliLiter(s) (100 mL/Hr) IV Continuous <Continuous>  dextrose 50% Injectable 25 Gram(s) IV Push once  dextrose 50% Injectable 12.5 Gram(s) IV Push once  dextrose 50% Injectable 25 Gram(s) IV Push once  diltiazem    Tablet 30 milliGRAM(s) Oral two times a day  folic acid 1 milliGRAM(s) Oral daily  gabapentin 400 milliGRAM(s) Oral two times a day  glucagon  Injectable 1 milliGRAM(s) IntraMuscular once  insulin lispro (ADMELOG) corrective regimen sliding scale   SubCutaneous three times a day before meals  insulin lispro (ADMELOG) corrective regimen sliding scale   SubCutaneous at bedtime  montelukast 10 milliGRAM(s) Oral daily  pantoprazole    Tablet 40 milliGRAM(s) Oral before breakfast  simvastatin 10 milliGRAM(s) Oral at bedtime  tiotropium 18 MICROgram(s) Capsule 1 Capsule(s) Inhalation daily  traMADol 50 milliGRAM(s) Oral two times a day    MEDICATIONS  (PRN):  acetaminophen     Tablet .. 650 milliGRAM(s) Oral every 6 hours PRN Temp greater or equal to 38C (100.4F), Mild Pain (1 - 3)  aluminum hydroxide/magnesium hydroxide/simethicone Suspension 30 milliLiter(s) Oral every 4 hours PRN Dyspepsia  dextrose Oral Gel 15 Gram(s) Oral once PRN Blood Glucose LESS THAN 70 milliGRAM(s)/deciliter  dicyclomine 10 milliGRAM(s) Oral two times a day before meals PRN Constipation  melatonin 3 milliGRAM(s) Oral at bedtime PRN Insomnia  ondansetron Injectable 4 milliGRAM(s) IV Push every 8 hours PRN Nausea and/or Vomiting      Allergies    No Known Allergies    Intolerances        Vital Signs Last 24 Hrs  T(C): 36.8 (08 Oct 2022 05:43), Max: 37.9 (07 Oct 2022 12:01)  T(F): 98.3 (08 Oct 2022 05:43), Max: 100.3 (07 Oct 2022 12:01)  HR: 78 (08 Oct 2022 05:43) (69 - 82)  BP: 117/67 (08 Oct 2022 05:43) (100/58 - 120/70)  BP(mean): --  RR: 17 (08 Oct 2022 05:43) (17 - 22)  SpO2: 95% (08 Oct 2022 05:43) (93% - 95%)    Parameters below as of 08 Oct 2022 05:43  Patient On (Oxygen Delivery Method): nasal cannula  O2 Flow (L/min): 3      I&O's Summary    07 Oct 2022 07:01  -  08 Oct 2022 07:00  --------------------------------------------------------  IN: 1088 mL / OUT: 0 mL / NET: 1088 mL        ON EXAM:  Constitutional: NAD, awake and alert, obese   HEENT: Moist Mucous Membranes, Anicteric  Pulmonary: Non-labored, breath sounds are diminished   Cardiovascular: Regular, S1 and S2 nl, No murmurs, rubs, gallops or clicks  Gastrointestinal: Bowel Sounds present, soft, nontender.   Lymph: No lymphadenopathy. No peripheral edema.  Skin: No visible rashes or ulcers.  Psych:  Mood & affect appropriate    LABS: All Labs Reviewed:                        9.1    15.25 )-----------( 302      ( 08 Oct 2022 07:52 )             28.2                         7.5    16.92 )-----------( 221      ( 07 Oct 2022 06:50 )             23.9                         7.6    15.81 )-----------( 196      ( 06 Oct 2022 07:30 )             24.4     08 Oct 2022 07:52    139    |  107    |  35     ----------------------------<  114    4.7     |  24     |  1.60   07 Oct 2022 06:50    140    |  106    |  30     ----------------------------<  98     4.2     |  24     |  1.60   06 Oct 2022 07:30    138    |  105    |  32     ----------------------------<  84     4.3     |  26     |  1.40     Ca    9.3        08 Oct 2022 07:52  Ca    8.6        07 Oct 2022 06:50  Ca    9.0        06 Oct 2022 07:30    TPro  6.3    /  Alb  2.9    /  TBili  1.2    /  DBili  x      /  AST  13     /  ALT  20     /  AlkPhos  60     06 Oct 2022 07:30  TPro  7.5    /  Alb  3.8    /  TBili  1.1    /  DBili  x      /  AST  21     /  ALT  27     /  AlkPhos  69     05 Oct 2022 13:40    PT/INR - ( 08 Oct 2022 07:52 )   PT: 31.7 sec;   INR: 2.68 ratio               Blood Culture: Organism --  Gram Stain Blood -- Gram Stain   Few polymorphonuclear leukocytes per low power field  No Squamous epithelial cells per low power field  Rare Gram positive cocci in pairs per oil power field  Rare Gram Negative Coccobacilli seen per oil power field  Specimen Source .Sputum Sputum  Culture-Blood --    Organism --  Gram Stain Blood -- Gram Stain --  Specimen Source .Blood Blood-Peripheral  Culture-Blood --    Organism --  Gram Stain Blood -- Gram Stain --  Specimen Source .Blood Blood-Peripheral  Culture-Blood --    Organism --  Gram Stain Blood -- Gram Stain --  Specimen Source Clean Catch Clean Catch (Midstream)  Culture-Blood --    Organism --  Gram Stain Blood -- Gram Stain --  Specimen Source .Blood Blood-Peripheral  Culture-Blood --    Organism --  Gram Stain Blood -- Gram Stain --  Specimen Source .Blood Blood-Peripheral  Culture-Blood --

## 2022-10-08 NOTE — CONSULT NOTE ADULT - SUBJECTIVE AND OBJECTIVE BOX
SURGERY PA CONSULT NOTE:    CHIEF COMPLAINT:  Patient is a 81y old  Female who presents with a chief complaint of Pneumonia.        HPI FROM ED:  HPI:  81-year-old obese female past medical history previously diagnosied with DMII (no longer current), HTN, GERD, COPD not on home O2 (with history of Pulmonary emboli - S/P IVC filter - followed by GI bleed, ischemic colitis, shock, and prolonged respiratory failure), pAfib s/p ablation currently on Coumadin, Aplastic Anemia, CKD, presents to the ER with complaints of generalized weakness this morning. Patient presents with daughter who states that yesterday patient was at her baseline and cooked a large meal for a family gathering for the holiday. This morning she was on the toilet and unable to get up due to weakness. Patient was found to be febrile 103 at home. Daughter reports patient slightly more confused than usual which she tends to get when she has fevers. Patient denies nausea vomiting diarrhea hematuria dysuria. Patient denies chest pain shortness of breath and worsening cough, although she does have a baseline productive cough due to her underlying COPD. Denies any sick contacts and no one else in the home is having the same symptoms.  Patient is back at baseline mental status, A and O x3. able to answer questions appropriately    of note: patient has recent hospital stay for superimposed bacterial pneumonia early .    Interval HPI:  Patient is a 81 year old female with PMHx as listed above admitted for pneumonia.  Patient endorses she began having abdominal pain yesterday morning, described pain as sharp in nature, 10/10 in intensity with no radiation of symptoms.  She has had 2 episodes of vomiting, one earlier this evening, bilious in nature.  Last ate yesterday morning since abdominal pain began.  Last BM this morning, which was normal for her.  Denies fevers, chills, chest pain, SOB, palpitations, change in bowel habits, melena, hematochezia.      ED Course  Vitals: T:103.8, HR 77, /50, RR 17, O2 96 on RA  Labs: Hg 9.1, INR 2.22, Cr 1.7, Lactate 2.2, UA clear,   Imaging: CXR: 1. Ill-defined increased density in the right lower lobe may be due to the overlying breast shadow however follow-up is recommended to help exclude an evolving right lower lobe infiltrate in this region. 2. No other significant findings.   Received: Vancomycin 1250mg x1, APAP 975mg x1, Zosyn 3.375g x1  EKG NSR without ischemic changes   (05 Oct 2022 16:45)      PAST MEDICAL HISTORY:  PAST MEDICAL & SURGICAL HISTORY:  COPD (chronic obstructive pulmonary disease)      DM (diabetes mellitus)      PAF (paroxysmal atrial fibrillation)  s/p ablation      Hiatal hernia      GIB (gastrointestinal bleeding)      S/P hysterectomy      S/P foot surgery      S/P knee surgery      After cataract  bilateral eye cataract surgically removed      Closed right hip fracture  rigth hip ORIF 2016      S/P IVC filter  2016      S/P carpal tunnel release  Right  &amp; 17          PAST SURGICAL HISTORY:    REVIEW OF SYSTEMS:  General/Constitutional: No acute distress, no headache, weakness, fevers, or chills   HEENT: Denies auditory or visual changes/disturbances, no vertigo, no throat pain, no dysphagia    Neck: Denies neck pain/stiffness, denies swelling/lumps/hoarseness   Lymphatic: Denies lumps or swelling in the axillae, groin, or neck bilaterally   Respiratory: Denies cough/hemoptysis, denies wheezing/SOB/dyspnea  Cardiac: Denies chest pain, palpitations  Abdomen: + abdominal pain, nausea, vomiting.   Extremities: Denies sores, swelling, discoloration bilat UE/LE  Genitourinary: Denies urinary issues or complaints, denies dysuria/hematuria  Neuro: Denies weakness, paraesthesias, paralysis, syncope, loss of vision  Skin: Denies pruritus, pain, rashes  Psych: Denies hallucinations, visual disturbances, or depression    MEDICATIONS:  Home Medications:  amiodarone 100 mg oral tablet: 1 tab(s) orally once a day (05 Oct 2022 16:37)  Bentyl 10 mg oral capsule: 1 cap(s) orally 2 times a day, As Needed (05 Oct 2022 16:37)  Cardizem: 25 milligram(s) orally 2 times a day (05 Oct 2022 16:37)  Coumadin 4 mg oral tablet: 1 tab(s) orally once a day  4mg 4x week; 6mg 3x week (05 Oct 2022 16:37)  folic acid 1 mg oral tablet: 1 tab(s) orally once a day (05 Oct 2022 16:37)  gabapentin 400 mg oral capsule: 1 cap(s) orally 2 times a day (05 Oct 2022 16:37)  NexIUM: 15 milligram(s) orally once a day (05 Oct 2022 16:37)  Procrit 10,000 units/mL preservative-free injectable solution: injectable once a week (05 Oct 2022 16:37)  simvastatin 10 mg oral tablet: 1 tab(s) orally once a day (at bedtime) (05 Oct 2022 16:37)  Singulair 10 mg oral tablet: 1 tab(s) orally once a day (05 Oct 2022 16:37)  traMADol 50 mg oral tablet: 1 tab(s) orally 2 times a day (05 Oct 2022 16:37)  Trelegy Ellipta: inhaled once a day (05 Oct 2022 16:37)    MEDICATIONS  (STANDING):  aMIOdarone    Tablet 100 milliGRAM(s) Oral daily  budesonide  80 MICROgram(s)/formoterol 4.5 MICROgram(s) Inhaler 2 Puff(s) Inhalation two times a day  cefTRIAXone   IVPB 1000 milliGRAM(s) IV Intermittent every 24 hours  dextrose 5%. 1000 milliLiter(s) (50 mL/Hr) IV Continuous <Continuous>  dextrose 5%. 1000 milliLiter(s) (100 mL/Hr) IV Continuous <Continuous>  dextrose 50% Injectable 25 Gram(s) IV Push once  dextrose 50% Injectable 12.5 Gram(s) IV Push once  dextrose 50% Injectable 25 Gram(s) IV Push once  diltiazem    Tablet 30 milliGRAM(s) Oral two times a day  esOMEPRAZOLE 20 milliGRAM(s) Oral every 24 hours  folic acid 1 milliGRAM(s) Oral daily  gabapentin 400 milliGRAM(s) Oral two times a day  glucagon  Injectable 1 milliGRAM(s) IntraMuscular once  insulin lispro (ADMELOG) corrective regimen sliding scale   SubCutaneous every 6 hours  montelukast 10 milliGRAM(s) Oral daily  simvastatin 10 milliGRAM(s) Oral at bedtime  sodium chloride 0.9%. 1000 milliLiter(s) (50 mL/Hr) IV Continuous <Continuous>  tiotropium 18 MICROgram(s) Capsule 1 Capsule(s) Inhalation daily  traMADol 50 milliGRAM(s) Oral two times a day    MEDICATIONS  (PRN):  acetaminophen     Tablet .. 650 milliGRAM(s) Oral every 6 hours PRN Temp greater or equal to 38C (100.4F), Mild Pain (1 - 3)  aluminum hydroxide/magnesium hydroxide/simethicone Suspension 30 milliLiter(s) Oral every 4 hours PRN Dyspepsia  dextrose Oral Gel 15 Gram(s) Oral once PRN Blood Glucose LESS THAN 70 milliGRAM(s)/deciliter  dicyclomine 10 milliGRAM(s) Oral two times a day before meals PRN Constipation  melatonin 3 milliGRAM(s) Oral at bedtime PRN Insomnia  ondansetron Injectable 4 milliGRAM(s) IV Push every 8 hours PRN Nausea and/or Vomiting      ALLERGIES:  Allergies    No Known Allergies    Intolerances        SOCIAL HISTORY:  Social History:  Previous Tobacco use 30 years ago, no alcohol use, lives at home with her  (05 Oct 2022 16:45)    Smoking: Yes [ ]  No [x]     ETOH  Yes [ ]  No [x]  Social [ ]  DRUGS:  Yes [ ]  No [x]      FAMILY HISTORY:  FAMILY HISTORY:  Family history of bladder cancer (Mother)    Family history of myocardial infarction (Father)        VITAL SIGNS:  Vital Signs Last 24 Hrs  T(C): 37.5 (08 Oct 2022 21:14), Max: 37.5 (08 Oct 2022 21:14)  T(F): 99.5 (08 Oct 2022 21:14), Max: 99.5 (08 Oct 2022 21:14)  HR: 87 (08 Oct 2022 21:14) (73 - 87)  BP: 133/62 (08 Oct 2022 21:14) (117/67 - 133/62)  BP(mean): --  RR: 18 (08 Oct 2022 21:14) (17 - 19)  SpO2: 93% (08 Oct 2022 21:14) (93% - 97%)    Parameters below as of 08 Oct 2022 21:14  Patient On (Oxygen Delivery Method): room air        PHYSICAL EXAM:  General: No acute distress, appears comfortable, well nourished, well-groomed, appears stated age  Head, Eyes, Ears, Nose, Throat: Normal cephalic/atraumatic, anicteric, conjunctiva-non injected and moist, vision grossly intact, hearing grossly intact, no nasal discharge, ears and nose symmetrical and atraumatic.  Nasal, oral, and oropharyngeal mucosa pink moist with no evidence of ulceration  Neck: Supple, carotids have good upstroke, trachea in the midline, without JVD or thyromegaly  Lymphatic: No evidence of masses or lymphadenopathy in the head, neck, trunk, axillary, inguinal, or supraclavicular regions  Chest: Lungs are clear to P&A, no wheezing, no rales, no ronchi, with good inspiratory effort  Heart: Heart rhythm regular, no murmurs  Abdomen: ABDOMEN DISTENDED, TENDERNESS TO DEEP PALPATION IN RLQ/LLQ.  No guarding, rebound, and no peritoneal signs.  No evidence of hepatosplenomegaly.  No evidence of abdominal wall hernias.  Inguinal regions are unremarkable with no evidence of hernias.   Extremity: No swelling, or open sores, no gross deformities,  good range of motion, 2+ peripheral pulses bilat UE/LE, no edema,  negative Kelley's sign, no lymphadenopathy  Neuro: Alert and oriented x3, motor and sensory intact  Psychiatric: Awake , alert, oriented x3 with an appropriate affect.   Skin: Good color, turgor, texture with no gross lesions, no eruptions, no rashes, no subcutaneous nodules and normal temperature.     LABS:                        9.1    15.25 )-----------( 302      ( 08 Oct 2022 07:52 )             28.2     10-08    139  |  107  |  35<H>  ----------------------------<  114<H>  4.7   |  24  |  1.60<H>    Ca    9.3      08 Oct 2022 07:52      PT/INR - ( 08 Oct 2022 07:52 )   PT: 31.7 sec;   INR: 2.68 ratio           Urinalysis Basic - ( 08 Oct 2022 20:00 )    Color: Yellow / Appearance: Clear / S.025 / pH: x  Gluc: x / Ketone: Negative  / Bili: Negative / Urobili: Negative   Blood: x / Protein: 30 mg/dL / Nitrite: Negative   Leuk Esterase: Negative / RBC: 0-2 /HPF / WBC 0-2   Sq Epi: x / Non Sq Epi: Moderate / Bacteria: Few          Culture - Sputum (collected 07 Oct 2022 16:00)  Source: .Sputum Sputum  Gram Stain (08 Oct 2022 06:22):    Few polymorphonuclear leukocytes per low power field    No Squamous epithelial cells per low power field    Rare Gram positive cocci in pairs per oil power field    Rare Gram Negative Coccobacilli seen per oil power field  Preliminary Report (08 Oct 2022 20:48):    Normal Respiratory Ginger present    Culture - Blood (collected 06 Oct 2022 22:10)  Source: .Blood Blood-Peripheral  Preliminary Report (08 Oct 2022 04:01):    No growth to date.    Culture - Blood (collected 06 Oct 2022 22:00)  Source: .Blood Blood-Peripheral  Preliminary Report (08 Oct 2022 04:01):    No growth to date.        RADIOLOGY & ADDITIONAL STUDIES:      ******PRELIMINARY REPORT******      ******PRELIMINARY REPORT******       ACC: [id number] EXAM:  CT ABDOMEN AND PELVIS                          PROCEDURE DATE:  10/08/2022    ******PRELIMINARY REPORT******      ******PRELIMINARY REPORT******     INTERPRETATION:  [facility] RADIOLOGIST PRELIMINARY REPORT    PROCEDURE INFORMATION:  Exam: CT Abdomen And Pelvis Without Contrast  Exam date and time: 10/8/2022 4:24 PM  Age: 81 years old  Clinical indication: Bloating    TECHNIQUE:  Imaging protocol: Computed tomography of the abdomen and pelvis without  contrast.  Total images: 400    COMPARISON:  CT ABDOMEN AND PELVIS 8/3/2016 3:58 PM    FINDINGS:  Lungs: Right lower lobe consolidation consistent with pneumonia. Mild   dependent  atelectasis in theleft lung.  Heart: Small pericardial effusion.    Liver: Normal. No mass.  Gallbladder and bile ducts: Normal. No calcified stones. No ductal   dilation.  Pancreas: Normal. No ductal dilation.  Spleen: Normal. No splenomegaly.  Adrenal glands: Bilateral adrenal thickening.  Kidneys and ureters: Likely left renal cyst. No hydronephrosis.  Stomach and bowel: Multiple dilated loops of small bowel with air-fluid   levels.  At least 2 sites of transition are identified in the right pelvis.   Moderate  gastric distention related to the small bowel obstruction. Surgical clip   within  the gastric lumen.  Appendix: No evidence of appendicitis.    Intraperitoneal space: Unremarkable. No free air. No significant fluid  collection.  Vasculature: IVC filter in place. Atherosclerosis is evident.  Lymph nodes: Unremarkable. No enlarged lymph nodes.  Urinary bladder: Unremarkable as visualized.  Reproductive: Prior hysterectomy noted.  Bones/joints: Postoperative changes of the right proximal femur. Spinal  degenerative changes are evident. Multiple chronic thoracic and lumbar   spine  compression deformities.  Soft tissues: Nonspecific soft tissue swelling in the left abdominal wall.    IMPRESSION:  1. Multiple dilated loops of small bowel with air-fluid levels. At least 2  sites of transition are identified in the right pelvis. Findings are   consistent  with moderate acute small bowel obstruction.  2. Moderate gastric distention related to the small bowel obstruction.  3. Right lower lobe pneumonia. Follow-up to resolution recommended.        ******PRELIMINARY REPORT******      ******PRELIMINARY REPORT******          JARROD BAUTISTA M.D.;[facility] RADIOLOGIST  This document is a PRELIMINARY interpretation and is pending final   attending approval. Oct  8 2022 10:03PM      ASSESSMENT:    PLAN:

## 2022-10-08 NOTE — PROGRESS NOTE ADULT - SUBJECTIVE AND OBJECTIVE BOX
Patient is a 81y old  Female who presents with a chief complaint of Pneumonia (08 Oct 2022 11:32)      INTERVAL HPI/OVERNIGHT EVENTS: Patient seen and examined at bedside. No overnight events. Denies fever, chills, chest pain, shortness of breath, abdominal pain, nausea/vomiting, headache.    MEDICATIONS  (STANDING):  aMIOdarone    Tablet 100 milliGRAM(s) Oral daily  azithromycin   Tablet 500 milliGRAM(s) Oral daily  budesonide  80 MICROgram(s)/formoterol 4.5 MICROgram(s) Inhaler 2 Puff(s) Inhalation two times a day  cefTRIAXone   IVPB 1000 milliGRAM(s) IV Intermittent every 24 hours  dextrose 5%. 1000 milliLiter(s) (50 mL/Hr) IV Continuous <Continuous>  dextrose 5%. 1000 milliLiter(s) (100 mL/Hr) IV Continuous <Continuous>  dextrose 50% Injectable 25 Gram(s) IV Push once  dextrose 50% Injectable 12.5 Gram(s) IV Push once  dextrose 50% Injectable 25 Gram(s) IV Push once  diltiazem    Tablet 30 milliGRAM(s) Oral two times a day  folic acid 1 milliGRAM(s) Oral daily  gabapentin 400 milliGRAM(s) Oral two times a day  glucagon  Injectable 1 milliGRAM(s) IntraMuscular once  insulin lispro (ADMELOG) corrective regimen sliding scale   SubCutaneous three times a day before meals  insulin lispro (ADMELOG) corrective regimen sliding scale   SubCutaneous at bedtime  montelukast 10 milliGRAM(s) Oral daily  pantoprazole    Tablet 40 milliGRAM(s) Oral before breakfast  simvastatin 10 milliGRAM(s) Oral at bedtime  tiotropium 18 MICROgram(s) Capsule 1 Capsule(s) Inhalation daily  traMADol 50 milliGRAM(s) Oral two times a day  warfarin 6 milliGRAM(s) Oral once    MEDICATIONS  (PRN):  acetaminophen     Tablet .. 650 milliGRAM(s) Oral every 6 hours PRN Temp greater or equal to 38C (100.4F), Mild Pain (1 - 3)  aluminum hydroxide/magnesium hydroxide/simethicone Suspension 30 milliLiter(s) Oral every 4 hours PRN Dyspepsia  dextrose Oral Gel 15 Gram(s) Oral once PRN Blood Glucose LESS THAN 70 milliGRAM(s)/deciliter  dicyclomine 10 milliGRAM(s) Oral two times a day before meals PRN Constipation  melatonin 3 milliGRAM(s) Oral at bedtime PRN Insomnia  ondansetron Injectable 4 milliGRAM(s) IV Push every 8 hours PRN Nausea and/or Vomiting      Allergies    No Known Allergies    Intolerances        REVIEW OF SYSTEMS:  CONSTITUTIONAL: No fever or chills  HEENT:  No headache, no sore throat  RESPIRATORY: No cough, wheezing, or shortness of breath  CARDIOVASCULAR: No chest pain, palpitations  GASTROINTESTINAL: Admits improving abd pain, nausea, vomiting, denies diarrhea  GENITOURINARY: No dysuria, frequency, or hematuria  NEUROLOGICAL: no focal weakness or dizziness  MUSCULOSKELETAL: no myalgias     Vital Signs Last 24 Hrs  T(C): 36.8 (08 Oct 2022 05:43), Max: 37.7 (07 Oct 2022 16:10)  T(F): 98.3 (08 Oct 2022 05:43), Max: 99.9 (07 Oct 2022 16:10)  HR: 78 (08 Oct 2022 05:43) (72 - 82)  BP: 117/67 (08 Oct 2022 05:43) (100/58 - 120/70)  BP(mean): --  RR: 17 (08 Oct 2022 05:43) (17 - 22)  SpO2: 95% (08 Oct 2022 05:43) (93% - 95%)    Parameters below as of 08 Oct 2022 05:43  Patient On (Oxygen Delivery Method): nasal cannula  O2 Flow (L/min): 3      PHYSICAL EXAM:  GENERAL: NAD  HEENT:  anicteric, moist mucous membranes  CHEST/LUNG:  CTA b/l, no rales, wheezes, or rhonchi  HEART:  RRR, S1, S2  ABDOMEN:  BS+, soft, nontender, nondistended  EXTREMITIES: no edema, cyanosis, or calf tenderness  NERVOUS SYSTEM: answers questions and follows commands appropriately    LABS:                        9.1    15.25 )-----------( 302      ( 08 Oct 2022 07:52 )             28.2     CBC Full  -  ( 08 Oct 2022 07:52 )  WBC Count : 15.25 K/uL  Hemoglobin : 9.1 g/dL  Hematocrit : 28.2 %  Platelet Count - Automated : 302 K/uL  Mean Cell Volume : 97.2 fl  Mean Cell Hemoglobin : 31.4 pg  Mean Cell Hemoglobin Concentration : 32.3 gm/dL  Auto Neutrophil # : 12.42 K/uL  Auto Lymphocyte # : 0.86 K/uL  Auto Monocyte # : 1.11 K/uL  Auto Eosinophil # : 0.01 K/uL  Auto Basophil # : 0.03 K/uL  Auto Neutrophil % : 81.4 %  Auto Lymphocyte % : 5.6 %  Auto Monocyte % : 7.3 %  Auto Eosinophil % : 0.1 %  Auto Basophil % : 0.2 %    08 Oct 2022 07:52    139    |  107    |  35     ----------------------------<  114    4.7     |  24     |  1.60     Ca    9.3        08 Oct 2022 07:52      PT/INR - ( 08 Oct 2022 07:52 )   PT: 31.7 sec;   INR: 2.68 ratio             CAPILLARY BLOOD GLUCOSE      POCT Blood Glucose.: 118 mg/dL (08 Oct 2022 11:41)  POCT Blood Glucose.: 122 mg/dL (08 Oct 2022 07:56)  POCT Blood Glucose.: 106 mg/dL (07 Oct 2022 21:56)  POCT Blood Glucose.: 146 mg/dL (07 Oct 2022 17:00)        Culture - Sputum (collected 10-07-22 @ 16:00)  Source: .Sputum Sputum  Gram Stain (10-08-22 @ 06:22):    Few polymorphonuclear leukocytes per low power field    No Squamous epithelial cells per low power field    Rare Gram positive cocci in pairs per oil power field    Rare Gram Negative Coccobacilli seen per oil power field    Culture - Blood (collected 10-06-22 @ 22:10)  Source: .Blood Blood-Peripheral  Preliminary Report (10-08-22 @ 04:01):    No growth to date.    Culture - Blood (collected 10-06-22 @ 22:00)  Source: .Blood Blood-Peripheral  Preliminary Report (10-08-22 @ 04:01):    No growth to date.    Culture - Urine (collected 10-05-22 @ 15:30)  Source: Clean Catch Clean Catch (Midstream)  Final Report (10-06-22 @ 18:54):    <10,000 CFU/mL Normal Urogenital Ginger    Culture - Blood (collected 10-05-22 @ 13:40)  Source: .Blood Blood-Peripheral  Preliminary Report (10-06-22 @ 19:01):    No growth to date.    Culture - Blood (collected 10-05-22 @ 13:25)  Source: .Blood Blood-Peripheral  Preliminary Report (10-06-22 @ 19:01):    No growth to date.        RADIOLOGY & ADDITIONAL TESTS:    Personally reviewed.     Consultant(s) Notes Reviewed:  [x] YES  [ ] NO     Patient is a 81y old  Female who presents with a chief complaint of Pneumonia (08 Oct 2022 11:32)      INTERVAL HPI/OVERNIGHT EVENTS: Patient seen and examined at bedside. Patient complained of abdominal pain overnight. Given Dilaudid 0.5 mg IVP x1, Miralax. Abdominal x-ray was ordered. Patient states pain is improved this morning. Patient s/p episode of emesis this AM. Denies fever, chills, chest pain, shortness of breath, headache.    MEDICATIONS  (STANDING):  aMIOdarone    Tablet 100 milliGRAM(s) Oral daily  azithromycin   Tablet 500 milliGRAM(s) Oral daily  budesonide  80 MICROgram(s)/formoterol 4.5 MICROgram(s) Inhaler 2 Puff(s) Inhalation two times a day  cefTRIAXone   IVPB 1000 milliGRAM(s) IV Intermittent every 24 hours  dextrose 5%. 1000 milliLiter(s) (50 mL/Hr) IV Continuous <Continuous>  dextrose 5%. 1000 milliLiter(s) (100 mL/Hr) IV Continuous <Continuous>  dextrose 50% Injectable 25 Gram(s) IV Push once  dextrose 50% Injectable 12.5 Gram(s) IV Push once  dextrose 50% Injectable 25 Gram(s) IV Push once  diltiazem    Tablet 30 milliGRAM(s) Oral two times a day  folic acid 1 milliGRAM(s) Oral daily  gabapentin 400 milliGRAM(s) Oral two times a day  glucagon  Injectable 1 milliGRAM(s) IntraMuscular once  insulin lispro (ADMELOG) corrective regimen sliding scale   SubCutaneous three times a day before meals  insulin lispro (ADMELOG) corrective regimen sliding scale   SubCutaneous at bedtime  montelukast 10 milliGRAM(s) Oral daily  pantoprazole    Tablet 40 milliGRAM(s) Oral before breakfast  simvastatin 10 milliGRAM(s) Oral at bedtime  tiotropium 18 MICROgram(s) Capsule 1 Capsule(s) Inhalation daily  traMADol 50 milliGRAM(s) Oral two times a day  warfarin 6 milliGRAM(s) Oral once    MEDICATIONS  (PRN):  acetaminophen     Tablet .. 650 milliGRAM(s) Oral every 6 hours PRN Temp greater or equal to 38C (100.4F), Mild Pain (1 - 3)  aluminum hydroxide/magnesium hydroxide/simethicone Suspension 30 milliLiter(s) Oral every 4 hours PRN Dyspepsia  dextrose Oral Gel 15 Gram(s) Oral once PRN Blood Glucose LESS THAN 70 milliGRAM(s)/deciliter  dicyclomine 10 milliGRAM(s) Oral two times a day before meals PRN Constipation  melatonin 3 milliGRAM(s) Oral at bedtime PRN Insomnia  ondansetron Injectable 4 milliGRAM(s) IV Push every 8 hours PRN Nausea and/or Vomiting      Allergies    No Known Allergies    Intolerances        REVIEW OF SYSTEMS:  CONSTITUTIONAL: No fever or chills  HEENT:  No headache, no sore throat  RESPIRATORY: No cough, wheezing, or shortness of breath  CARDIOVASCULAR: No chest pain, palpitations  GASTROINTESTINAL: Admits improving abd pain, nausea, vomiting, denies diarrhea  GENITOURINARY: No dysuria, frequency, or hematuria  NEUROLOGICAL: no focal weakness or dizziness  MUSCULOSKELETAL: no myalgias     Vital Signs Last 24 Hrs  T(C): 36.8 (08 Oct 2022 05:43), Max: 37.7 (07 Oct 2022 16:10)  T(F): 98.3 (08 Oct 2022 05:43), Max: 99.9 (07 Oct 2022 16:10)  HR: 78 (08 Oct 2022 05:43) (72 - 82)  BP: 117/67 (08 Oct 2022 05:43) (100/58 - 120/70)  BP(mean): --  RR: 17 (08 Oct 2022 05:43) (17 - 22)  SpO2: 95% (08 Oct 2022 05:43) (93% - 95%)    Parameters below as of 08 Oct 2022 05:43  Patient On (Oxygen Delivery Method): nasal cannula  O2 Flow (L/min): 3      PHYSICAL EXAM:  GENERAL: NAD. on NC, elderly female  HEENT:  anicteric, moist mucous membranes  CHEST/LUNG:  diminished breath sounds bilaterally  HEART:  RRR, S1, S2  ABDOMEN:  BS+, soft, nontender, nondistended  EXTREMITIES: no edema, cyanosis, or calf tenderness  NERVOUS SYSTEM: AAO x3    LABS:                        9.1    15.25 )-----------( 302      ( 08 Oct 2022 07:52 )             28.2     CBC Full  -  ( 08 Oct 2022 07:52 )  WBC Count : 15.25 K/uL  Hemoglobin : 9.1 g/dL  Hematocrit : 28.2 %  Platelet Count - Automated : 302 K/uL  Mean Cell Volume : 97.2 fl  Mean Cell Hemoglobin : 31.4 pg  Mean Cell Hemoglobin Concentration : 32.3 gm/dL  Auto Neutrophil # : 12.42 K/uL  Auto Lymphocyte # : 0.86 K/uL  Auto Monocyte # : 1.11 K/uL  Auto Eosinophil # : 0.01 K/uL  Auto Basophil # : 0.03 K/uL  Auto Neutrophil % : 81.4 %  Auto Lymphocyte % : 5.6 %  Auto Monocyte % : 7.3 %  Auto Eosinophil % : 0.1 %  Auto Basophil % : 0.2 %    08 Oct 2022 07:52    139    |  107    |  35     ----------------------------<  114    4.7     |  24     |  1.60     Ca    9.3        08 Oct 2022 07:52      PT/INR - ( 08 Oct 2022 07:52 )   PT: 31.7 sec;   INR: 2.68 ratio             CAPILLARY BLOOD GLUCOSE      POCT Blood Glucose.: 118 mg/dL (08 Oct 2022 11:41)  POCT Blood Glucose.: 122 mg/dL (08 Oct 2022 07:56)  POCT Blood Glucose.: 106 mg/dL (07 Oct 2022 21:56)  POCT Blood Glucose.: 146 mg/dL (07 Oct 2022 17:00)        Culture - Sputum (collected 10-07-22 @ 16:00)  Source: .Sputum Sputum  Gram Stain (10-08-22 @ 06:22):    Few polymorphonuclear leukocytes per low power field    No Squamous epithelial cells per low power field    Rare Gram positive cocci in pairs per oil power field    Rare Gram Negative Coccobacilli seen per oil power field    Culture - Blood (collected 10-06-22 @ 22:10)  Source: .Blood Blood-Peripheral  Preliminary Report (10-08-22 @ 04:01):    No growth to date.    Culture - Blood (collected 10-06-22 @ 22:00)  Source: .Blood Blood-Peripheral  Preliminary Report (10-08-22 @ 04:01):    No growth to date.    Culture - Urine (collected 10-05-22 @ 15:30)  Source: Clean Catch Clean Catch (Midstream)  Final Report (10-06-22 @ 18:54):    <10,000 CFU/mL Normal Urogenital Ginger    Culture - Blood (collected 10-05-22 @ 13:40)  Source: .Blood Blood-Peripheral  Preliminary Report (10-06-22 @ 19:01):    No growth to date.    Culture - Blood (collected 10-05-22 @ 13:25)  Source: .Blood Blood-Peripheral  Preliminary Report (10-06-22 @ 19:01):    No growth to date.        RADIOLOGY & ADDITIONAL TESTS:    Personally reviewed.     Consultant(s) Notes Reviewed:  [x] YES  [ ] NO

## 2022-10-08 NOTE — PROGRESS NOTE ADULT - ASSESSMENT
81-year-old obese female past medical history previously diagnosied with DMII (no longer current), HTN, GERD, COPD not on home O2, pAfib s/p ablation currently on Coumadin, Aplastic Anemia, CKD, HFpEF admitted for pneumonia workup    HTN Pafib Heart Failure   Admitted with Right lower lobe PNA  - tele SR 73 no events overnight , can DC tele   - Metoprolol recently d/c'd outpatient by Dr. Stout secondary to episodes of dizziness   - Continue home amiodarone 100 mg PO qd and Cardizem 30 mg BID   - Continue home coumadin Goal INR 2-3   - No meaningful signs of volume overload on exam   - Echo (4/2021) showed EF 57%   - Strict I/Os, daily weights.  - Anemia followed by hem onc   - CKD follow up primary   - Monitor and replete lytes, keep K>4, Mg>2.  - Other cardiovascular workup will depend on clinical course.

## 2022-10-08 NOTE — PROGRESS NOTE ADULT - SUBJECTIVE AND OBJECTIVE BOX
[facility]  INFECTIOUS DISEASES   [address] [place] [address]  Tel: [phone]     Fax: [phone]  ======================================================  MD Emma Archer Kaushal, MD Cho, Michelle, MD   =======================================================    MARTINEZ JONES [id number]    Follow up: pneumonia     No fever, has some cough with yellow sputum. Leukocytosis 15k.     Allergies:  No Known Allergies    Antibiotics:  acetaminophen     Tablet .. 650 milliGRAM(s) Oral every 6 hours PRN  aluminum hydroxide/magnesium hydroxide/simethicone Suspension 30 milliLiter(s) Oral every 4 hours PRN  aMIOdarone    Tablet 100 milliGRAM(s) Oral daily  budesonide  80 MICROgram(s)/formoterol 4.5 MICROgram(s) Inhaler 2 Puff(s) Inhalation two times a day  cefTRIAXone   IVPB 1000 milliGRAM(s) IV Intermittent every 24 hours  dextrose 5%. 1000 milliLiter(s) IV Continuous <Continuous>  dextrose 5%. 1000 milliLiter(s) IV Continuous <Continuous>  dextrose 50% Injectable 25 Gram(s) IV Push once  dextrose 50% Injectable 12.5 Gram(s) IV Push once  dextrose 50% Injectable 25 Gram(s) IV Push once  dextrose Oral Gel 15 Gram(s) Oral once PRN  dicyclomine 10 milliGRAM(s) Oral two times a day before meals PRN  diltiazem    Tablet 30 milliGRAM(s) Oral two times a day  folic acid 1 milliGRAM(s) Oral daily  gabapentin 400 milliGRAM(s) Oral two times a day  glucagon  Injectable 1 milliGRAM(s) IntraMuscular once  insulin lispro (ADMELOG) corrective regimen sliding scale   SubCutaneous three times a day before meals  insulin lispro (ADMELOG) corrective regimen sliding scale   SubCutaneous at bedtime  melatonin 3 milliGRAM(s) Oral at bedtime PRN  montelukast 10 milliGRAM(s) Oral daily  ondansetron Injectable 4 milliGRAM(s) IV Push every 8 hours PRN  pantoprazole    Tablet 40 milliGRAM(s) Oral before breakfast  simvastatin 10 milliGRAM(s) Oral at bedtime  tiotropium 18 MICROgram(s) Capsule 1 Capsule(s) Inhalation daily  traMADol 50 milliGRAM(s) Oral two times a day  warfarin 4 milliGRAM(s) Oral once       REVIEW OF SYSTEMS:  CONSTITUTIONAL:  No Fever or chills  HEENT:   No diplopia or blurred vision.  No earache, sore throat or runny nose.  CARDIOVASCULAR:  No chest pain or SOB  RESPIRATORY:  + cough, +shortness of breath, + sputum  GASTROINTESTINAL:  No nausea, vomiting or diarrhea.  GENITOURINARY:  No dysuria, frequency or urgency. No Blood in urine  MUSCULOSKELETAL:  no joint aches, no muscle pain  SKIN:  No change in skin, hair or nails.  NEUROLOGIC:  No paresthesias or weakness.  PSYCHIATRIC:  No disorder of thought or mood.  ENDOCRINE:  No heat or cold intolerance, polyuria or polydipsia.  HEMATOLOGICAL:  No easy bruising or bleeding.      Physical Exam:  ICU Vital Signs Last 24 Hrs  T(C): 36.8 (08 Oct 2022 12:48), Max: 37.7 (07 Oct 2022 16:10)  T(F): 98.2 (08 Oct 2022 12:48), Max: 99.9 (07 Oct 2022 16:10)  HR: 81 (08 Oct 2022 12:48) (72 - 82)  BP: 123/54 (08 Oct 2022 12:48) (100/58 - 123/54)  RR: 18 (08 Oct 2022 12:48) (17 - 22)  SpO2: 93% (08 Oct 2022 12:48) (93% - 95%)  O2 Parameters below as of 08 Oct 2022 12:48  Patient On (Oxygen Delivery Method): nasal cannula  O2 Flow (L/min): 3  GEN: NAD  HEENT: normocephalic and atraumatic. EOMI. DERRICK.    NECK: Supple.  No lymphadenopathy   LUNGS: mild wheezing and rhonchi   HEART: Regular rate and rhythm   ABDOMEN: Soft, nontender, and nondistended.  Positive bowel sounds.    : No CVA tenderness  EXTREMITIES: Without edema.  MSK: no joint swelling  NEUROLOGIC: grossly intact.  PSYCHIATRIC: Appropriate affect .  SKIN: No ulceration or rash     Labs:  10-08    139  |  107  |  35<H>  ----------------------------<  114<H>  4.7   |  24  |  1.60<H>    Ca    9.3      08 Oct 2022 07:52                        9.1    15.25 )-----------( 302      ( 08 Oct 2022 07:52 )             28.2     PT/INR - ( 08 Oct 2022 07:52 )   PT: 31.7 sec;   INR: 2.68 ratio      RECENT CULTURES:  10-07 @ 16:00 .Sputum Sputum       Few polymorphonuclear leukocytes per low power field  No Squamous epithelial cells per low power field  Rare Gram positive cocci in pairs per oil power field  Rare Gram Negative Coccobacilli seen per oil power field    10-06 @ 22:10 .Blood Blood-Peripheral     No growth to date.    10-06 @ 22:00 .Blood Blood-Peripheral     No growth to date.    10-05 @ 15:30 Clean Catch Clean Catch (Midstream)     <10,000 CFU/mL Normal Urogenital Ginger    10-05 @ 13:40 .Blood Blood-Peripheral     No growth to date    NotDetec    10-05 @ 13:25 .Blood Blood-Peripheral     No growth to date.    All imaging and data are reviewed.   < from: CT Chest No Cont (10.06.22 @ 09:04) >  IMPRESSION:  Right lower lobe consolidation compatible with pneumonia. Recommend CT   chest follow-up to ensure clearing.  Small right pleural effusion.    Assessment and Plan:   81-year-old  female with past medical history of chronic bronchitis, pulmonary embolism s/p IVC filter, paroxysmal A. fib with chronic Coumadin, IgG deficiency, CKD, dyslipidemia presented with confusion and generalized weakness being admitted for acute metabolic encephalopathy, acute respiratory distress/Failure with right lower lobe community-acquired pneumonia (with history of pneumonia/parainfluenza in April 2022)  Had IvIg last month as per patient; seen by Dr. Parker.     No fever today, WBC; 15k, blood cultures negative to date. Feels fine, with some SOB and sputum yellow color.     Recommendations:   - Follow up SPutum cutlure  - Follow up legionella U ag  - MRSA PCR  - Continue ceftriaxone 1gm daily  - Continue azithromycin 500mg daily     Will follow     Sea Donnelly MD  Division of Infectious Diseases   Please call ID service at [phone] with any question.      35 minutes spent on total encounter assessing patient, examination, chart review, counseling and coordinating care by the attending physician/nurse/care manager.

## 2022-10-08 NOTE — PROGRESS NOTE ADULT - SUBJECTIVE AND OBJECTIVE BOX
Patient is a 81y old  Female who presents with a chief complaint of Pneumonia (08 Oct 2022 14:37)      INTERVAL HPI/OVERNIGHT EVENTS:    continues to have productive cough. also complains of abdominal pain    MEDICATIONS  (STANDING):  aMIOdarone    Tablet 100 milliGRAM(s) Oral daily  budesonide  80 MICROgram(s)/formoterol 4.5 MICROgram(s) Inhaler 2 Puff(s) Inhalation two times a day  cefTRIAXone   IVPB 1000 milliGRAM(s) IV Intermittent every 24 hours  dextrose 5%. 1000 milliLiter(s) (50 mL/Hr) IV Continuous <Continuous>  dextrose 5%. 1000 milliLiter(s) (100 mL/Hr) IV Continuous <Continuous>  dextrose 50% Injectable 25 Gram(s) IV Push once  dextrose 50% Injectable 12.5 Gram(s) IV Push once  dextrose 50% Injectable 25 Gram(s) IV Push once  diltiazem    Tablet 30 milliGRAM(s) Oral two times a day  esOMEPRAZOLE 20 milliGRAM(s) Oral every 24 hours  folic acid 1 milliGRAM(s) Oral daily  gabapentin 400 milliGRAM(s) Oral two times a day  glucagon  Injectable 1 milliGRAM(s) IntraMuscular once  insulin lispro (ADMELOG) corrective regimen sliding scale   SubCutaneous every 6 hours  montelukast 10 milliGRAM(s) Oral daily  simvastatin 10 milliGRAM(s) Oral at bedtime  sodium chloride 0.9%. 1000 milliLiter(s) (50 mL/Hr) IV Continuous <Continuous>  tiotropium 18 MICROgram(s) Capsule 1 Capsule(s) Inhalation daily  traMADol 50 milliGRAM(s) Oral two times a day  warfarin 4 milliGRAM(s) Oral once      MEDICATIONS  (PRN):  acetaminophen     Tablet .. 650 milliGRAM(s) Oral every 6 hours PRN Temp greater or equal to 38C (100.4F), Mild Pain (1 - 3)  aluminum hydroxide/magnesium hydroxide/simethicone Suspension 30 milliLiter(s) Oral every 4 hours PRN Dyspepsia  dextrose Oral Gel 15 Gram(s) Oral once PRN Blood Glucose LESS THAN 70 milliGRAM(s)/deciliter  dicyclomine 10 milliGRAM(s) Oral two times a day before meals PRN Constipation  melatonin 3 milliGRAM(s) Oral at bedtime PRN Insomnia  ondansetron Injectable 4 milliGRAM(s) IV Push every 8 hours PRN Nausea and/or Vomiting      Allergies    No Known Allergies    Intolerances        PAST MEDICAL & SURGICAL HISTORY:  COPD (chronic obstructive pulmonary disease)      DM (diabetes mellitus)      PAF (paroxysmal atrial fibrillation)  s/p ablation      Hiatal hernia      GIB (gastrointestinal bleeding)      S/P hysterectomy      S/P foot surgery      S/P knee surgery      After cataract  bilateral eye cataract surgically removed      Closed right hip fracture  rigth hip ORIF july 19 2016      S/P IVC filter  nov 2016      S/P carpal tunnel release  Right 2008 &amp; 6/27/17          Vital Signs Last 24 Hrs  T(C): 36.8 (08 Oct 2022 12:48), Max: 37.2 (07 Oct 2022 21:36)  T(F): 98.2 (08 Oct 2022 12:48), Max: 99 (07 Oct 2022 21:36)  HR: 79 (08 Oct 2022 17:49) (78 - 81)  BP: 124/79 (08 Oct 2022 17:49) (117/67 - 124/79)  BP(mean): --  RR: 18 (08 Oct 2022 12:48) (17 - 20)  SpO2: 93% (08 Oct 2022 12:48) (93% - 95%)    Parameters below as of 08 Oct 2022 12:48  Patient On (Oxygen Delivery Method): nasal cannula  O2 Flow (L/min): 3      PHYSICAL EXAMINATION:    GENERAL: The patient is awake and alert in no apparent distress.     HEENT: Head is normocephalic and atraumatic    NECK: no JVD    LUNGS: rales right base with diminished air entry bilateral    HEART: Irregular rate and rhythm without murmur.    ABDOMEN: Soft, nontender, and nondistended.      EXTREMITIES: Without any cyanosis, clubbing, rash, lesions or edema.    NEUROLOGIC: Grossly intact.    SKIN: No ulceration or induration present.      LABS:                        9.1    15.25 )-----------( 302      ( 08 Oct 2022 07:52 )             28.2     10-08    139  |  107  |  35<H>  ----------------------------<  114<H>  4.7   |  24  |  1.60<H>    Ca    9.3      08 Oct 2022 07:52      PT/INR - ( 08 Oct 2022 07:52 )   PT: 31.7 sec;   INR: 2.68 ratio                         Procalcitonin, Serum: 0.69 ng/mL (10-08-22 @ 07:52)  Procalcitonin, Serum: 1.04 ng/mL (10-06-22 @ 07:30)      MICROBIOLOGY:  Culture Results:   No growth to date. (10-06 @ 22:10)  Culture Results:   No growth to date. (10-06 @ 22:00)  Culture Results:   <10,000 CFU/mL Normal Urogenital Ginger (10-05 @ 15:30)  Culture Results:   No growth to date. (10-05 @ 13:40)  Culture Results:   No growth to date. (10-05 @ 13:25)          Assessment:    Right lower lobe pneumonia  Abdominal pain - etiology uncertain  Hx COPD  Atrial Fibrillation  Diabetes  Hx Pulmonary emboli - S/P IVC filter    Plan:    Supplemental oxygen and monitor pulse oximetry  Complete course of Rocephin  Symbicort + Spiriva inhalers  Check results of CT abdomen

## 2022-10-08 NOTE — PROCEDURE NOTE - NSPROCDETAILS_GEN_ALL_CORE
nasogastric/audible air bolus/placement confirmed by auscultation/gastric secretions aspirated, placement confirmed

## 2022-10-08 NOTE — PROGRESS NOTE ADULT - PROBLEM SELECTOR PLAN 6
Pt with history of pAfib s/p ablation on Coumadin   - Daily INR checks  - Dose Coumadin in hospital daily. Give 4mg tonight  - Home regimen: Coumadin 4mg Tuesday/Wednesday/Thursday/Sunday + Coumadin 6mg Monday/Friday/Saturday  - Amiodarone 100mg qd Pt with history of pAfib s/p ablation on Coumadin   - Daily INR checks  - Dose Coumadin in hospital daily  - Home regimen: Coumadin 4mg Tuesday/Wednesday/Thursday/Sunday + Coumadin 6mg Monday/Friday/Saturday  - Amiodarone 100mg qd

## 2022-10-08 NOTE — CONSULT NOTE ADULT - PROBLEM SELECTOR RECOMMENDATION 9
- NGT placed w/ 400cc of bilious gastric contents on immediate return  - Plan for small bowel series w/ gastrograffin tomorrow   - NPO/IVF  - Monitor NGT output  - Surgery team will continue to follow  - Discussed w/ Dr. Jones

## 2022-10-08 NOTE — PHARMACOTHERAPY INTERVENTION NOTE - COMMENTS
PA requested for an alternative for protonix, Recommended Pepcid. He ordered Pepcid 20mg po bid. Recommended renal adjustment (CrCl 40) - 20mg qd.

## 2022-10-09 LAB
ALBUMIN SERPL ELPH-MCNC: 2.5 G/DL — LOW (ref 3.3–5)
ALP SERPL-CCNC: 70 U/L — SIGNIFICANT CHANGE UP (ref 40–120)
ALT FLD-CCNC: 16 U/L — SIGNIFICANT CHANGE UP (ref 12–78)
ANION GAP SERPL CALC-SCNC: 7 MMOL/L — SIGNIFICANT CHANGE UP (ref 5–17)
AST SERPL-CCNC: 10 U/L — LOW (ref 15–37)
BILIRUB SERPL-MCNC: 0.5 MG/DL — SIGNIFICANT CHANGE UP (ref 0.2–1.2)
BUN SERPL-MCNC: 34 MG/DL — HIGH (ref 7–23)
CALCIUM SERPL-MCNC: 8.7 MG/DL — SIGNIFICANT CHANGE UP (ref 8.5–10.1)
CHLORIDE SERPL-SCNC: 107 MMOL/L — SIGNIFICANT CHANGE UP (ref 96–108)
CO2 SERPL-SCNC: 28 MMOL/L — SIGNIFICANT CHANGE UP (ref 22–31)
CREAT SERPL-MCNC: 1.3 MG/DL — SIGNIFICANT CHANGE UP (ref 0.5–1.3)
CULTURE RESULTS: SIGNIFICANT CHANGE UP
EGFR: 42 ML/MIN/1.73M2 — LOW
GLUCOSE SERPL-MCNC: 94 MG/DL — SIGNIFICANT CHANGE UP (ref 70–99)
HCT VFR BLD CALC: 27.2 % — LOW (ref 34.5–45)
HGB BLD-MCNC: 8.7 G/DL — LOW (ref 11.5–15.5)
INR BLD: 3.03 RATIO — HIGH (ref 0.88–1.16)
MCHC RBC-ENTMCNC: 32 GM/DL — SIGNIFICANT CHANGE UP (ref 32–36)
MCHC RBC-ENTMCNC: 32.1 PG — SIGNIFICANT CHANGE UP (ref 27–34)
MCV RBC AUTO: 100.4 FL — HIGH (ref 80–100)
NRBC # BLD: 0 /100 WBCS — SIGNIFICANT CHANGE UP (ref 0–0)
PLATELET # BLD AUTO: 343 K/UL — SIGNIFICANT CHANGE UP (ref 150–400)
POTASSIUM SERPL-MCNC: 4.6 MMOL/L — SIGNIFICANT CHANGE UP (ref 3.5–5.3)
POTASSIUM SERPL-SCNC: 4.6 MMOL/L — SIGNIFICANT CHANGE UP (ref 3.5–5.3)
PROT SERPL-MCNC: 6.7 G/DL — SIGNIFICANT CHANGE UP (ref 6–8.3)
PROTHROM AB SERPL-ACNC: 35.8 SEC — HIGH (ref 10.5–13.4)
RBC # BLD: 2.71 M/UL — LOW (ref 3.8–5.2)
RBC # FLD: 24.6 % — HIGH (ref 10.3–14.5)
S PNEUM AG UR QL: NEGATIVE — SIGNIFICANT CHANGE UP
SODIUM SERPL-SCNC: 142 MMOL/L — SIGNIFICANT CHANGE UP (ref 135–145)
SPECIMEN SOURCE: SIGNIFICANT CHANGE UP
WBC # BLD: 12.5 K/UL — HIGH (ref 3.8–10.5)
WBC # FLD AUTO: 12.5 K/UL — HIGH (ref 3.8–10.5)

## 2022-10-09 PROCEDURE — 99233 SBSQ HOSP IP/OBS HIGH 50: CPT

## 2022-10-09 PROCEDURE — 99232 SBSQ HOSP IP/OBS MODERATE 35: CPT

## 2022-10-09 PROCEDURE — 99221 1ST HOSP IP/OBS SF/LOW 40: CPT

## 2022-10-09 PROCEDURE — 74250 X-RAY XM SM INT 1CNTRST STD: CPT | Mod: 26

## 2022-10-09 RX ORDER — SODIUM CHLORIDE 9 MG/ML
1000 INJECTION INTRAMUSCULAR; INTRAVENOUS; SUBCUTANEOUS
Refills: 0 | Status: DISCONTINUED | OUTPATIENT
Start: 2022-10-09 | End: 2022-10-10

## 2022-10-09 RX ORDER — SODIUM CHLORIDE 9 MG/ML
1000 INJECTION INTRAMUSCULAR; INTRAVENOUS; SUBCUTANEOUS
Refills: 0 | Status: DISCONTINUED | OUTPATIENT
Start: 2022-10-09 | End: 2022-10-09

## 2022-10-09 RX ADMIN — PANTOPRAZOLE SODIUM 40 MILLIGRAM(S): 20 TABLET, DELAYED RELEASE ORAL at 17:02

## 2022-10-09 RX ADMIN — TRAMADOL HYDROCHLORIDE 50 MILLIGRAM(S): 50 TABLET ORAL at 05:41

## 2022-10-09 RX ADMIN — PANTOPRAZOLE SODIUM 40 MILLIGRAM(S): 20 TABLET, DELAYED RELEASE ORAL at 05:42

## 2022-10-09 RX ADMIN — CEFTRIAXONE 100 MILLIGRAM(S): 500 INJECTION, POWDER, FOR SOLUTION INTRAMUSCULAR; INTRAVENOUS at 18:54

## 2022-10-09 RX ADMIN — SODIUM CHLORIDE 75 MILLILITER(S): 9 INJECTION INTRAMUSCULAR; INTRAVENOUS; SUBCUTANEOUS at 20:00

## 2022-10-09 RX ADMIN — BUDESONIDE AND FORMOTEROL FUMARATE DIHYDRATE 2 PUFF(S): 160; 4.5 AEROSOL RESPIRATORY (INHALATION) at 17:02

## 2022-10-09 RX ADMIN — AMIODARONE HYDROCHLORIDE 100 MILLIGRAM(S): 400 TABLET ORAL at 05:41

## 2022-10-09 RX ADMIN — Medication 30 MILLIGRAM(S): at 05:41

## 2022-10-09 RX ADMIN — DIATRIZOATE MEGLUMINE 100 MILLILITER(S): 180 INJECTION, SOLUTION INTRAVESICAL at 08:57

## 2022-10-09 RX ADMIN — MONTELUKAST 10 MILLIGRAM(S): 4 TABLET, CHEWABLE ORAL at 11:05

## 2022-10-09 RX ADMIN — SODIUM CHLORIDE 75 MILLILITER(S): 9 INJECTION INTRAMUSCULAR; INTRAVENOUS; SUBCUTANEOUS at 11:55

## 2022-10-09 RX ADMIN — GABAPENTIN 400 MILLIGRAM(S): 400 CAPSULE ORAL at 05:41

## 2022-10-09 NOTE — CONSULT NOTE ADULT - CONSULT REQUESTED DATE/TIME
06-Oct-2022 22:36
06-Oct-2022 09:39
06-Oct-2022 12:08
09-Oct-2022 07:36
06-Oct-2022 10:48
08-Oct-2022 23:10

## 2022-10-09 NOTE — PROGRESS NOTE ADULT - PROBLEM SELECTOR PLAN 9
Pt with previous diagnosis of DM2  - Last A1c (4/22) 5.5  - correctional insulin as ordered  - A1c 5.2

## 2022-10-09 NOTE — PROGRESS NOTE ADULT - ASSESSMENT
81-year-old obese female past medical history previously diagnosed with DMII (no longer current), HTN, GERD, COPD not on home O2, pAfib s/p ablation currently on Coumadin, Aplastic Anemia, CKD, HFpEF admitted for pneumonia workup    HTN Pafib Heart Failure   Admitted with Right lower lobe PNA  - tele SR 73  - Metoprolol recently d/c'd outpatient by Dr. Stout secondary to episodes of dizziness   - Continue home amiodarone 100 mg PO qd and Cardizem 30 mg BID   - on coumadin, which is to be held given sbo  - No meaningful signs of volume overload on exam   - Echo (4/2021) showed EF 57%   - Strict I/Os, daily weights.    - now with moderate acute bowel obstruction  - gi and surgery fu  - may need to hold oral meds now that she is npo. can use iv lopressor in place of po cardizem and amiodarone    - Monitor and replete lytes, keep K>4, Mg>2.  - Other cardiovascular workup will depend on clinical course.  - will follow with you

## 2022-10-09 NOTE — PROGRESS NOTE ADULT - PROBLEM SELECTOR PLAN 6
Pt with history of pAfib s/p ablation on Coumadin   - Daily INR checks  - Dose Coumadin in hospital daily  - Home regimen: Coumadin 4mg Tuesday/Wednesday/Thursday/Sunday + Coumadin 6mg Monday/Friday/Saturday  - Amiodarone 100mg qd Pt with history of pAfib s/p ablation on Coumadin   - Daily INR checks  - Hold Coumadin in the setting of SBO  - Home regimen: Coumadin 4mg Tuesday/Wednesday/Thursday/Sunday + Coumadin 6mg Monday/Friday/Saturday  - Amiodarone 100mg qd

## 2022-10-09 NOTE — CONSULT NOTE ADULT - ASSESSMENT
81 year old female w/ extensive PMHx admitted for pneumonia.  Abdominal CT findings suggestive of moderate acute small bowel obstruction.  Abdominal exam concerning with tenderness to palpation in RLQ/LLQ.  WBC elevated to 15k.  
81-year-old obese female past medical history previously diagnosied with DMII (no longer current), HTN, GERD, COPD not on home O2, pAfib s/p ablation currently on Coumadin, Aplastic Anemia, CKD, HFpEF admitted for pneumonia workup    Arrhythmia   - hx of paroxysmal afib (s/p ablation; on coumadin)   - rate controlled  - Metoprolol recently d/c'd outpatient by Dr. Stout secondary to episodes of dizziness   - Continue home amiodarone 100 mg PO qd and Cardizem 25 mg BID   - Continue home coumadin   - Goal INR 2-3     Volume   - hx of HFpEF   - No meaningful signs of volume overload on exam   - Echo (4/2021) showed EF 57%   - Strict I/Os, daily weights.    Hemodynamics   - hx of HTN   - BP well controlled, monitor routine hemodynamics.  - Continue home cardizem     - Monitor and replete lytes, keep K>4, Mg>2.  - Other cardiovascular workup will depend on clinical course.  - All other workup per primary team.  - Will continue to follow.            
Patient with anemia multifactorial in anemia secondary to renal insufficiency, myelodysplasia now complicated by  admission with pneumonia  patient is MIRIAM dependent and does require occasional transfusion    Recommendations:  1.  follow CBC  2.  transfuse as clinically indicated  3.  to hold MIRIAM while hospitalized and will resume as an outpatient  4.  continue present abx  5.  further hematologic recommendations pending above  discussed with patient 
SBO  NPO  IVF  NGT  surgical eval noted  conservative management for now
Prerenal azotemia, CKD 3a  Pneumonia  Diabetes  Hypertension  h/o CHF      Follow creatinine trend. Encourage Po intake. On abx. To  continue current meds. Monitor blood sugar levels. Insulin coverage as needed. Dietary restriction.   Monitor BP trend. Titrate BP meds as needed. Will follow electrolytes and renal function trend. Out pt f/u post discharge.   Further recommendations pending clinical course. Thank you for the courtesy of this referral.

## 2022-10-09 NOTE — PROGRESS NOTE ADULT - PROBLEM SELECTOR PLAN 7
Pt with history of aplastic anemia, follows Dr. Parker outpatient  - Procrit 10 injection weekly with heme  - folic acid 1mg qd  - monitor blood counts daily  - transfuse if Hg <7.0  - Heme (Dr. Parker) consulted, recs appreciated: hold MIRIAM while hospitalized and will resume as an outpatient

## 2022-10-09 NOTE — CONSULT NOTE ADULT - SUBJECTIVE AND OBJECTIVE BOX
Chief Complaint:  Patient is a 81y old  Female who presents with a chief complaint of Pneumonia (09 Oct 2022 07:18)    COPD (chronic obstructive pulmonary disease)    DM (diabetes mellitus)    Pulmonary fibrosis    PAF (paroxysmal atrial fibrillation)    Hiatal hernia    GIB (gastrointestinal bleeding)    Pericarditis    Shoulder fracture, right    Hematoma    S/P hysterectomy    S/P foot surgery    S/P knee surgery    After cataract    Closed right hip fracture    S/P IVC filter    S/P carpal tunnel release       HPI:  81-year-old obese female past medical history previously diagnosied with DMII (no longer current), HTN, GERD, COPD not on home O2 (with history of Pulmonary emboli - S/P IVC filter - followed by GI bleed, ischemic colitis, shock, and prolonged respiratory failure), pAfib s/p ablation currently on Coumadin, Aplastic Anemia, CKD, presents to the ER with complaints of generalized weakness this morning. Patient presents with daughter who states that yesterday patient was at her baseline and cooked a large meal for a family gathering for the holiday. This morning she was on the toilet and unable to get up due to weakness. Patient was found to be febrile 103 at home. Daughter reports patient slightly more confused than usual which she tends to get when she has fevers. Patient denies nausea vomiting diarrhea hematuria dysuria. Patient denies chest pain shortness of breath and worsening cough, although she does have a baseline productive cough due to her underlying COPD. Denies any sick contacts and no one else in the home is having the same symptoms.  Patient is back at baseline mental status, A and O x3. able to answer questions appropriately    of note: patient has recent hospital stay for superimposed bacterial pneumonia early .    ED Course  Vitals: T:103.8, HR 77, /50, RR 17, O2 96 on RA  Labs: Hg 9.1, INR 2.22, Cr 1.7, Lactate 2.2, UA clear,   Imaging: CXR: 1. Ill-defined increased density in the right lower lobe may be due to the overlying breast shadow however follow-up is recommended to help exclude an evolving right lower lobe infiltrate in this region. 2. No other significant findings.   Received: Vancomycin 1250mg x1, APAP 975mg x1, Zosyn 3.375g x1  EKG NSR without ischemic changes   (05 Oct 2022 16:45)      No Known Allergies      acetaminophen     Tablet .. 650 milliGRAM(s) Oral every 6 hours PRN  aMIOdarone    Tablet 100 milliGRAM(s) Oral daily  budesonide  80 MICROgram(s)/formoterol 4.5 MICROgram(s) Inhaler 2 Puff(s) Inhalation two times a day  cefTRIAXone   IVPB 1000 milliGRAM(s) IV Intermittent every 24 hours  dextrose 5%. 1000 milliLiter(s) IV Continuous <Continuous>  dextrose 5%. 1000 milliLiter(s) IV Continuous <Continuous>  dextrose 50% Injectable 25 Gram(s) IV Push once  dextrose 50% Injectable 12.5 Gram(s) IV Push once  dextrose 50% Injectable 25 Gram(s) IV Push once  dextrose Oral Gel 15 Gram(s) Oral once PRN  diatrizoate meglumine/diatrizoate sodium. 100 milliLiter(s) Oral once  diltiazem    Tablet 30 milliGRAM(s) Oral two times a day  gabapentin 400 milliGRAM(s) Oral two times a day  glucagon  Injectable 1 milliGRAM(s) IntraMuscular once  insulin lispro (ADMELOG) corrective regimen sliding scale   SubCutaneous every 6 hours  melatonin 3 milliGRAM(s) Oral at bedtime PRN  montelukast 10 milliGRAM(s) Oral daily  ondansetron Injectable 4 milliGRAM(s) IV Push every 8 hours PRN  pantoprazole  Injectable 40 milliGRAM(s) IV Push two times a day  simvastatin 10 milliGRAM(s) Oral at bedtime  sodium chloride 0.9%. 1000 milliLiter(s) IV Continuous <Continuous>  tiotropium 18 MICROgram(s) Capsule 1 Capsule(s) Inhalation daily  traMADol 50 milliGRAM(s) Oral two times a day        FAMILY HISTORY:  Family history of bladder cancer (Mother)    Family history of myocardial infarction (Father)          Review of Systems:    General:  No wt loss, fevers, chills, night sweats,fatigue,   Eyes:  Good vision, no reported pain  ENT:  No sore throat, pain, runny nose, dysphagia  CV:  No pain, palpitatioins, hypo/hypertension  Resp:  No dyspnea, cough, tachypnea, wheezing  :  No pain, bleeding, incontinence, nocturia  Muscle:  No pain, weakness  Neuro:  No weakness, tingling, memory problems  Psych:  No fatigue, insomnia, mood problems, depression  Endocrine:  No polyuria, polydypsia, cold/heat intolerance  Heme:  No petechiae, ecchymosis, easy bruisability  Skin:  No rash, tattoos, scars, edema    Relevant Family History:       Relevant Social History:       Physical Exam:    Vital Signs:  Vital Signs Last 24 Hrs  T(C): 37.5 (08 Oct 2022 21:14), Max: 37.5 (08 Oct 2022 21:14)  T(F): 99.5 (08 Oct 2022 21:14), Max: 99.5 (08 Oct 2022 21:14)  HR: 74 (09 Oct 2022 05:28) (73 - 87)  BP: 130/74 (09 Oct 2022 05:28) (123/54 - 133/62)  BP(mean): --  RR: 18 (09 Oct 2022 05:28) (17 - 18)  SpO2: 96% (09 Oct 2022 05:28) (93% - 97%)    Parameters below as of 09 Oct 2022 05:28  Patient On (Oxygen Delivery Method): room air      Daily     Daily Weight in k.6 (09 Oct 2022 05:28)    General:  Appears stated age, well-groomed, well-nourished, no distress  HEENT:  NC/AT,  conjunctivae clear and pink, no thyromegaly, nodules, adenopathy, no JVD  Chest:  Full & symmetric excursion, no increased effort, breath sounds clear  Cardiovascular:  Regular rhythm, S1, S2, no murmur/rub/S3/S4, no abdominal bruit, no edema  Abdomen:  Soft, non-tender, non-distended, normoactive bowel sounds,  no masses ,no hepatosplenomeagaly, no signs of chronic liver disease  Extremities:  no cyanosis,clubbing or edema  Skin:  No rash/erythema/ecchymoses/petechiae/wounds/abscess/warm/dry  Neuro/Psych:  Alert, oriented, no asterixis, no tremor, no encephalopathy    Laboratory:                            9.1    15.25 )-----------( 302      ( 08 Oct 2022 07:52 )             28.2     1008    139  |  107  |  35<H>  ----------------------------<  114<H>  4.7   |  24  |  1.60<H>    Ca    9.3      08 Oct 2022 07:52        PT/INR - ( 08 Oct 2022 07:52 )   PT: 31.7 sec;   INR: 2.68 ratio           Urinalysis Basic - ( 08 Oct 2022 20:00 )    Color: Yellow / Appearance: Clear / S.025 / pH: x  Gluc: x / Ketone: Negative  / Bili: Negative / Urobili: Negative   Blood: x / Protein: 30 mg/dL / Nitrite: Negative   Leuk Esterase: Negative / RBC: 0-2 /HPF / WBC 0-2   Sq Epi: x / Non Sq Epi: Moderate / Bacteria: Few        Imaging:

## 2022-10-09 NOTE — PROGRESS NOTE ADULT - SUBJECTIVE AND OBJECTIVE BOX
Patient is a 81y old  Female who presents with a chief complaint of Pneumonia (09 Oct 2022 13:54)      INTERVAL HPI/OVERNIGHT EVENTS:    NG tube was inserted last night. Patient continues to have abdominal discomfort. No change in cough with sputum production    MEDICATIONS  (STANDING):  aMIOdarone    Tablet 100 milliGRAM(s) Oral daily  budesonide  80 MICROgram(s)/formoterol 4.5 MICROgram(s) Inhaler 2 Puff(s) Inhalation two times a day  cefTRIAXone   IVPB 1000 milliGRAM(s) IV Intermittent every 24 hours  dextrose 5%. 1000 milliLiter(s) (50 mL/Hr) IV Continuous <Continuous>  dextrose 5%. 1000 milliLiter(s) (100 mL/Hr) IV Continuous <Continuous>  dextrose 50% Injectable 25 Gram(s) IV Push once  dextrose 50% Injectable 12.5 Gram(s) IV Push once  dextrose 50% Injectable 25 Gram(s) IV Push once  diltiazem    Tablet 30 milliGRAM(s) Oral two times a day  gabapentin 400 milliGRAM(s) Oral two times a day  glucagon  Injectable 1 milliGRAM(s) IntraMuscular once  insulin lispro (ADMELOG) corrective regimen sliding scale   SubCutaneous every 6 hours  montelukast 10 milliGRAM(s) Oral daily  pantoprazole  Injectable 40 milliGRAM(s) IV Push two times a day  simvastatin 10 milliGRAM(s) Oral at bedtime  sodium chloride 0.9%. 1000 milliLiter(s) (75 mL/Hr) IV Continuous <Continuous>  tiotropium 18 MICROgram(s) Capsule 1 Capsule(s) Inhalation daily  traMADol 50 milliGRAM(s) Oral two times a day      MEDICATIONS  (PRN):  acetaminophen     Tablet .. 650 milliGRAM(s) Oral every 6 hours PRN Temp greater or equal to 38C (100.4F), Mild Pain (1 - 3)  dextrose Oral Gel 15 Gram(s) Oral once PRN Blood Glucose LESS THAN 70 milliGRAM(s)/deciliter  ondansetron Injectable 4 milliGRAM(s) IV Push every 8 hours PRN Nausea and/or Vomiting      Allergies    No Known Allergies    Intolerances        PAST MEDICAL & SURGICAL HISTORY:  COPD (chronic obstructive pulmonary disease)      DM (diabetes mellitus)      PAF (paroxysmal atrial fibrillation)  s/p ablation      Hiatal hernia      GIB (gastrointestinal bleeding)      S/P hysterectomy      S/P foot surgery      S/P knee surgery      After cataract  bilateral eye cataract surgically removed      Closed right hip fracture  rigth hip ORIF 2016      S/P IVC filter  2016      S/P carpal tunnel release  Right  &amp; 17          Vital Signs Last 24 Hrs  T(C): 37.1 (09 Oct 2022 13:25), Max: 37.5 (08 Oct 2022 21:14)  T(F): 98.8 (09 Oct 2022 13:25), Max: 99.5 (08 Oct 2022 21:14)  HR: 73 (09 Oct 2022 13:25) (71 - 87)  BP: 108/68 (09 Oct 2022 13:25) (108/68 - 133/62)  BP(mean): --  RR: 18 (09 Oct 2022 13:25) (17 - 18)  SpO2: 94% (09 Oct 2022 13:25) (93% - 97%)    Parameters below as of 09 Oct 2022 13:25  Patient On (Oxygen Delivery Method): nasal cannula  O2 Flow (L/min): 3      PHYSICAL EXAMINATION:    GENERAL: The patient is awake and alert in no apparent distress.     HEENT: N/C, A/T    NECK: no JVD    LUNGS: diminished air entry bilateral    HEART: Irregular rate and rhythm without murmur.    ABDOMEN: distended with positive bowel sounds    EXTREMITIES: Without any cyanosis, clubbing, rash, lesions or edema.    NEUROLOGIC: Grossly intact.    SKIN: No ulceration or induration present.      LABS:                        8.7    12.50 )-----------( 343      ( 09 Oct 2022 06:38 )             27.2     10    142  |  107  |  34<H>  ----------------------------<  94  4.6   |  28  |  1.30    Ca    8.7      09 Oct 2022 06:38    TPro  6.7  /  Alb  2.5<L>  /  TBili  0.5  /  DBili  x   /  AST  10<L>  /  ALT  16  /  AlkPhos  70  10-09    PT/INR - ( 09 Oct 2022 12:34 )   PT: 35.8 sec;   INR: 3.03 ratio           Urinalysis Basic - ( 08 Oct 2022 20:00 )    Color: Yellow / Appearance: Clear / S.025 / pH: x  Gluc: x / Ketone: Negative  / Bili: Negative / Urobili: Negative   Blood: x / Protein: 30 mg/dL / Nitrite: Negative   Leuk Esterase: Negative / RBC: 0-2 /HPF / WBC 0-2   Sq Epi: x / Non Sq Epi: Moderate / Bacteria: Few                  Procalcitonin, Serum: 0.69 ng/mL (10-08-22 @ 07:52)      MICROBIOLOGY:  Culture Results:   Normal Respiratory Ginger present (10-07 @ 16:00)  Culture Results:   No growth to date. (10-06 @ 22:10)  Culture Results:   No growth to date. (10-06 @ 22:00)          Assessment:    Right lower lobe pneumonia  Acute Hypoxic respiratory failure  COPD  Small bowel obstruction    Plan:    NG drainage  Supplemental oxygen  IV Rocephin  Symbicort + Spiriva

## 2022-10-09 NOTE — PROGRESS NOTE ADULT - SUBJECTIVE AND OBJECTIVE BOX
[facility] - Tori Jones, Pam, Sai, Manuel, El Stout  Office Number:  [phone]    Patient resting comfortably in bed in NAD.  ngt in place  reports abd pain    ROS: negative unless otherwise mentioned.    Telemetry:  sr    MEDICATIONS  (STANDING):  aMIOdarone    Tablet 100 milliGRAM(s) Oral daily  budesonide  80 MICROgram(s)/formoterol 4.5 MICROgram(s) Inhaler 2 Puff(s) Inhalation two times a day  cefTRIAXone   IVPB 1000 milliGRAM(s) IV Intermittent every 24 hours  dextrose 5%. 1000 milliLiter(s) (50 mL/Hr) IV Continuous <Continuous>  dextrose 5%. 1000 milliLiter(s) (100 mL/Hr) IV Continuous <Continuous>  dextrose 50% Injectable 25 Gram(s) IV Push once  dextrose 50% Injectable 12.5 Gram(s) IV Push once  dextrose 50% Injectable 25 Gram(s) IV Push once  diltiazem    Tablet 30 milliGRAM(s) Oral two times a day  gabapentin 400 milliGRAM(s) Oral two times a day  glucagon  Injectable 1 milliGRAM(s) IntraMuscular once  insulin lispro (ADMELOG) corrective regimen sliding scale   SubCutaneous every 6 hours  montelukast 10 milliGRAM(s) Oral daily  pantoprazole  Injectable 40 milliGRAM(s) IV Push two times a day  simvastatin 10 milliGRAM(s) Oral at bedtime  sodium chloride 0.9%. 1000 milliLiter(s) (75 mL/Hr) IV Continuous <Continuous>  tiotropium 18 MICROgram(s) Capsule 1 Capsule(s) Inhalation daily  traMADol 50 milliGRAM(s) Oral two times a day    MEDICATIONS  (PRN):  acetaminophen     Tablet .. 650 milliGRAM(s) Oral every 6 hours PRN Temp greater or equal to 38C (100.4F), Mild Pain (1 - 3)  dextrose Oral Gel 15 Gram(s) Oral once PRN Blood Glucose LESS THAN 70 milliGRAM(s)/deciliter  melatonin 3 milliGRAM(s) Oral at bedtime PRN Insomnia  ondansetron Injectable 4 milliGRAM(s) IV Push every 8 hours PRN Nausea and/or Vomiting      Allergies    No Known Allergies    Intolerances        Vital Signs Last 24 Hrs  T(C): 37.5 (08 Oct 2022 21:14), Max: 37.5 (08 Oct 2022 21:14)  T(F): 99.5 (08 Oct 2022 21:14), Max: 99.5 (08 Oct 2022 21:14)  HR: 71 (09 Oct 2022 07:46) (71 - 87)  BP: 130/74 (09 Oct 2022 05:28) (124/79 - 133/62)  BP(mean): --  RR: 18 (09 Oct 2022 05:28) (17 - 18)  SpO2: 96% (09 Oct 2022 05:28) (93% - 97%)    Parameters below as of 09 Oct 2022 05:28  Patient On (Oxygen Delivery Method): room air        I&O's Summary    08 Oct 2022 07:01  -  09 Oct 2022 07:00  --------------------------------------------------------  IN: 0 mL / OUT: 1050 mL / NET: -1050 mL        ON EXAM:  Constitutional: NAD, awake and alert, obese   HEENT: Moist Mucous Membranes, Anicteric  Pulmonary: Non-labored, breath sounds are diminished   Cardiovascular: Regular, S1 and S2 nl, No murmurs, rubs, gallops or clicks  Gastrointestinal: Bowel Sounds present, soft, nontender.   Lymph: No lymphadenopathy. No peripheral edema.  Skin: No visible rashes or ulcers.  Psych:  Mood & affect appropriate    LABS: All Labs Reviewed:                        8.7    12.50 )-----------( 343      ( 09 Oct 2022 06:38 )             27.2                         9.1    15.25 )-----------( 302      ( 08 Oct 2022 07:52 )             28.2                         7.5    16.92 )-----------( 221      ( 07 Oct 2022 06:50 )             23.9     09 Oct 2022 06:38    142    |  107    |  34     ----------------------------<  94     4.6     |  28     |  1.30   08 Oct 2022 07:52    139    |  107    |  35     ----------------------------<  114    4.7     |  24     |  1.60   07 Oct 2022 06:50    140    |  106    |  30     ----------------------------<  98     4.2     |  24     |  1.60     Ca    8.7        09 Oct 2022 06:38  Ca    9.3        08 Oct 2022 07:52  Ca    8.6        07 Oct 2022 06:50    TPro  6.7    /  Alb  2.5    /  TBili  0.5    /  DBili  x      /  AST  10     /  ALT  16     /  AlkPhos  70     09 Oct 2022 06:38    PT/INR - ( 08 Oct 2022 07:52 )   PT: 31.7 sec;   INR: 2.68 ratio               Blood Culture: Organism --  Gram Stain Blood -- Gram Stain   Few polymorphonuclear leukocytes per low power field  No Squamous epithelial cells per low power field  Rare Gram positive cocci in pairs per oil power field  Rare Gram Negative Coccobacilli seen per oil power field  Specimen Source .Sputum Sputum  Culture-Blood --    Organism --  Gram Stain Blood -- Gram Stain --  Specimen Source .Blood Blood-Peripheral  Culture-Blood --    Organism --  Gram Stain Blood -- Gram Stain --  Specimen Source .Blood Blood-Peripheral  Culture-Blood --    Organism --  Gram Stain Blood -- Gram Stain --  Specimen Source Clean Catch Clean Catch (Midstream)  Culture-Blood --    Organism --  Gram Stain Blood -- Gram Stain --  Specimen Source .Blood Blood-Peripheral  Culture-Blood --    Organism --  Gram Stain Blood -- Gram Stain --  Specimen Source .Blood Blood-Peripheral  Culture-Blood --

## 2022-10-09 NOTE — PROGRESS NOTE ADULT - PROBLEM SELECTOR PLAN 1
Continue care as per primary team  Pain control, supportive care, OOB with assistance  NPO, IV fluids  Monitor and record NGT output  F/U Small bowel series with gastrografin today  F/u AM labs, replete lytes prn   Will discuss with Dr. Jones

## 2022-10-09 NOTE — PROGRESS NOTE ADULT - ASSESSMENT
Patient with anemia multifactorial in anemia secondary to renal insufficiency, myelodysplasia now complicated by  admission with pneumonia  patient is MIRIAM dependent and does require occasional transfusion  s/p 1 unit PRBC with improvement in hgb 9.1  developed abd pain NV last night and now being treated for SBO    Recommendations:  1.  follow CBC  2.  to hold MIRIAM while hospitalized and will resume as an outpatient  3.  continue present abx  4.  continue surgical management  5.  hold transfusion and follow CBC  6.  further hematologic recommendations pending above  discussed with patient

## 2022-10-09 NOTE — CONSULT NOTE ADULT - CONSULT REASON
SBO
abdominal pain
Afib/HFpEF
KIMBERLY
PNA
anemia secondary renal insufficiency D63.1  myelodysplasia D46.Z

## 2022-10-09 NOTE — PROGRESS NOTE ADULT - ASSESSMENT
81-year-old obese female past medical history previously diagnosied with DMII (no longer current), HTN, GERD, COPD not on home O2, pAfib s/p ablation currently on Coumadin, Aplastic Anemia, CKD, HFpEF admitted for pneumonia, on IV Rocephin and Azithro, course c/b SBO requiring NGT.  81-year-old obese female past medical history previously diagnosied with DMII (no longer current), HTN, GERD, COPD not on home O2, pAfib s/p ablation currently on Coumadin, Aplastic Anemia, CKD, HFpEF admitted for pneumonia, on IV Rocephin and Azithro, course c/b SBO.

## 2022-10-09 NOTE — PROGRESS NOTE ADULT - PROBLEM SELECTOR PLAN 12
DVT prophylaxis  - coumadin for A fib ordered DVT prophylaxis  - Hold coumadin in the setting of SBO, discussed with surgery

## 2022-10-09 NOTE — PROGRESS NOTE ADULT - PROBLEM SELECTOR PLAN 5
Pt follows Dr. Jun Oneil for chronic pain management  - Tramadol 50mg bid  - Gabapentin 400mg bid (given patients current GFR, max dose is 900mg per day)

## 2022-10-09 NOTE — PROGRESS NOTE ADULT - SUBJECTIVE AND OBJECTIVE BOX
[facility]  INFECTIOUS DISEASES   [address] [place] [address]  Tel: [phone]     Fax: [phone]  ======================================================  MD Emma Archer Kaushal, MD Cho, Michelle, MD   =======================================================    MARTINEZ JONES [id number]    Follow up: pneumonia     No fever, has some cough with yellow sputum. Leukocytosis 12k.     Allergies:  No Known Allergies    Antibiotics:  acetaminophen     Tablet .. 650 milliGRAM(s) Oral every 6 hours PRN  aluminum hydroxide/magnesium hydroxide/simethicone Suspension 30 milliLiter(s) Oral every 4 hours PRN  aMIOdarone    Tablet 100 milliGRAM(s) Oral daily  budesonide  80 MICROgram(s)/formoterol 4.5 MICROgram(s) Inhaler 2 Puff(s) Inhalation two times a day  cefTRIAXone   IVPB 1000 milliGRAM(s) IV Intermittent every 24 hours  dextrose 5%. 1000 milliLiter(s) IV Continuous <Continuous>  dextrose 5%. 1000 milliLiter(s) IV Continuous <Continuous>  dextrose 50% Injectable 25 Gram(s) IV Push once  dextrose 50% Injectable 12.5 Gram(s) IV Push once  dextrose 50% Injectable 25 Gram(s) IV Push once  dextrose Oral Gel 15 Gram(s) Oral once PRN  dicyclomine 10 milliGRAM(s) Oral two times a day before meals PRN  diltiazem    Tablet 30 milliGRAM(s) Oral two times a day  folic acid 1 milliGRAM(s) Oral daily  gabapentin 400 milliGRAM(s) Oral two times a day  glucagon  Injectable 1 milliGRAM(s) IntraMuscular once  insulin lispro (ADMELOG) corrective regimen sliding scale   SubCutaneous three times a day before meals  insulin lispro (ADMELOG) corrective regimen sliding scale   SubCutaneous at bedtime  melatonin 3 milliGRAM(s) Oral at bedtime PRN  montelukast 10 milliGRAM(s) Oral daily  ondansetron Injectable 4 milliGRAM(s) IV Push every 8 hours PRN  pantoprazole    Tablet 40 milliGRAM(s) Oral before breakfast  simvastatin 10 milliGRAM(s) Oral at bedtime  tiotropium 18 MICROgram(s) Capsule 1 Capsule(s) Inhalation daily  traMADol 50 milliGRAM(s) Oral two times a day  warfarin 4 milliGRAM(s) Oral once       REVIEW OF SYSTEMS:  CONSTITUTIONAL:  No Fever or chills  HEENT:   No diplopia or blurred vision.  No earache, sore throat or runny nose.  CARDIOVASCULAR:  No chest pain or SOB  RESPIRATORY:  + cough, +shortness of breath, + sputum  GASTROINTESTINAL:  No nausea, vomiting or diarrhea.  GENITOURINARY:  No dysuria, frequency or urgency. No Blood in urine  MUSCULOSKELETAL:  no joint aches, no muscle pain  SKIN:  No change in skin, hair or nails.  NEUROLOGIC:  No paresthesias or weakness.  PSYCHIATRIC:  No disorder of thought or mood.  ENDOCRINE:  No heat or cold intolerance, polyuria or polydipsia.  HEMATOLOGICAL:  No easy bruising or bleeding.      Physical Exam:  Vital Signs Last 24 Hrs  T(C): 37.1 (09 Oct 2022 13:25), Max: 37.5 (08 Oct 2022 21:14)  T(F): 98.8 (09 Oct 2022 13:25), Max: 99.5 (08 Oct 2022 21:14)  HR: 73 (09 Oct 2022 13:25) (71 - 87)  BP: 108/68 (09 Oct 2022 13:25) (108/68 - 133/62)  BP(mean): --  RR: 18 (09 Oct 2022 13:25) (17 - 18)  SpO2: 94% (09 Oct 2022 13:25) (93% - 97%)  Parameters below as of 09 Oct 2022 13:25  Patient On (Oxygen Delivery Method): nasal cannula  O2 Flow (L/min): 3  GEN: NAD  HEENT: normocephalic and atraumatic. EOMI. DERRICK.    NECK: Supple.  No lymphadenopathy   LUNGS: mild wheezing and rhonchi   HEART: Regular rate and rhythm   ABDOMEN: Soft, nontender, and nondistended.  Positive bowel sounds.    : No CVA tenderness  EXTREMITIES: Without edema.  MSK: no joint swelling  NEUROLOGIC: grossly intact.  PSYCHIATRIC: Appropriate affect .  SKIN: No ulceration or rash     Labs:                        8.7    12.50 )-----------( 343      ( 09 Oct 2022 06:38 )             27.2     10-09    142  |  107  |  34<H>  ----------------------------<  94  4.6   |  28  |  1.30    Ca    8.7      09 Oct 2022 06:38    TPro  6.7  /  Alb  2.5<L>  /  TBili  0.5  /  DBili  x   /  AST  10<L>  /  ALT  16  /  AlkPhos  70  10-09    Culture - Sputum (collected 10-07-22 @ 16:00)  Source: .Sputum Sputum  Gram Stain (10-08-22 @ 06:22):    Few polymorphonuclear leukocytes per low power field    No Squamous epithelial cells per low power field    Rare Gram positive cocci in pairs per oil power field    Rare Gram Negative Coccobacilli seen per oil power field    Culture - Blood (collected 10-06-22 @ 22:10)  Source: .Blood Blood-Peripheral    Culture - Blood (collected 10-06-22 @ 22:00)  Source: .Blood Blood-Peripheral    Culture - Urine (collected 10-05-22 @ 15:30)  Source: Clean Catch Clean Catch (Midstream)  Final Report (10-06-22 @ 18:54):    <10,000 CFU/mL Normal Urogenital Ginger    Culture - Blood (collected 10-05-22 @ 13:40)  Source: .Blood Blood-Peripheral    Culture - Blood (collected 10-05-22 @ 13:25)  Source: .Blood Blood-Peripheral    WBC Count: 12.50 K/uL (10-09-22 @ 06:38)  WBC Count: 15.25 K/uL (10-08-22 @ 07:52)  WBC Count: 16.92 K/uL (10-07-22 @ 06:50)  WBC Count: 15.81 K/uL (10-06-22 @ 07:30)  WBC Count: 9.88 K/uL (10-05-22 @ 13:40)    Creatinine, Serum: 1.30 mg/dL (10-09-22 @ 06:38)  Creatinine, Serum: 1.60 mg/dL (10-08-22 @ 07:52)  Creatinine, Serum: 1.60 mg/dL (10-07-22 @ 06:50)  Creatinine, Serum: 1.40 mg/dL (10-06-22 @ 07:30)  Creatinine, Serum: 1.70 mg/dL (10-05-22 @ 13:40)    Procalcitonin, Serum: 0.69 ng/mL (10-08-22 @ 07:52)  Procalcitonin, Serum: 1.04 ng/mL (10-06-22 @ 07:30)     COVID-19 PCR: NotDetec (10-05-22 @ 13:40)  SARS-CoV-2: NotDetec (10-05-22 @ 13:40)    All imaging and data are reviewed.   < from: CT Chest No Cont (10.06.22 @ 09:04) >  IMPRESSION:  Right lower lobe consolidation compatible with pneumonia. Recommend CT   chest follow-up to ensure clearing.  Small right pleural effusion.    Assessment and Plan:   81-year-old  female with past medical history of chronic bronchitis, pulmonary embolism s/p IVC filter, paroxysmal A. fib with chronic Coumadin, IgG deficiency, CKD, dyslipidemia presented with confusion and generalized weakness being admitted for acute metabolic encephalopathy, acute respiratory distress/Failure with right lower lobe community-acquired pneumonia (with history of pneumonia/parainfluenza in April 2022)  Had IvIg last month as per patient; seen by Dr. Parker.     No fever today, WBC; 12k, blood cultures negative to date.     Recommendations:   - Follow up Sputum culture  - Follow up legionella U ag  - MRSA PCR  - Continue ceftriaxone 1gm daily  - Continue azithromycin 500mg daily     Will follow     Sea Donnelly MD  Division of Infectious Diseases   Please call ID service at [phone] with any question.      35 minutes spent on total encounter assessing patient, examination, chart review, counseling and coordinating care by the attending physician/nurse/care manager.

## 2022-10-09 NOTE — PROGRESS NOTE ADULT - SUBJECTIVE AND OBJECTIVE BOX
Patient is a 81y old  Female who presents with a chief complaint of Pneumonia (09 Oct 2022 11:38)      INTERVAL HPI/OVERNIGHT EVENTS: Patient seen and examined at bedside. No overnight events. NPO 2/2 SBO. NG in place. Patient states abdominal pain is improved. Denies fever, chills, chest pain, shortness of breath, headache.    MEDICATIONS  (STANDING):  aMIOdarone    Tablet 100 milliGRAM(s) Oral daily  budesonide  80 MICROgram(s)/formoterol 4.5 MICROgram(s) Inhaler 2 Puff(s) Inhalation two times a day  cefTRIAXone   IVPB 1000 milliGRAM(s) IV Intermittent every 24 hours  dextrose 5%. 1000 milliLiter(s) (100 mL/Hr) IV Continuous <Continuous>  dextrose 5%. 1000 milliLiter(s) (50 mL/Hr) IV Continuous <Continuous>  dextrose 50% Injectable 25 Gram(s) IV Push once  dextrose 50% Injectable 12.5 Gram(s) IV Push once  dextrose 50% Injectable 25 Gram(s) IV Push once  diltiazem    Tablet 30 milliGRAM(s) Oral two times a day  gabapentin 400 milliGRAM(s) Oral two times a day  glucagon  Injectable 1 milliGRAM(s) IntraMuscular once  insulin lispro (ADMELOG) corrective regimen sliding scale   SubCutaneous every 6 hours  montelukast 10 milliGRAM(s) Oral daily  pantoprazole  Injectable 40 milliGRAM(s) IV Push two times a day  simvastatin 10 milliGRAM(s) Oral at bedtime  sodium chloride 0.9%. 1000 milliLiter(s) (75 mL/Hr) IV Continuous <Continuous>  tiotropium 18 MICROgram(s) Capsule 1 Capsule(s) Inhalation daily  traMADol 50 milliGRAM(s) Oral two times a day    MEDICATIONS  (PRN):  acetaminophen     Tablet .. 650 milliGRAM(s) Oral every 6 hours PRN Temp greater or equal to 38C (100.4F), Mild Pain (1 - 3)  dextrose Oral Gel 15 Gram(s) Oral once PRN Blood Glucose LESS THAN 70 milliGRAM(s)/deciliter  melatonin 3 milliGRAM(s) Oral at bedtime PRN Insomnia  ondansetron Injectable 4 milliGRAM(s) IV Push every 8 hours PRN Nausea and/or Vomiting      Allergies    No Known Allergies    Intolerances        REVIEW OF SYSTEMS:  CONSTITUTIONAL: No fever or chills  HEENT:  No headache, no sore throat  RESPIRATORY: No cough, wheezing, or shortness of breath  CARDIOVASCULAR: No chest pain, palpitations  GASTROINTESTINAL: Admits improving abd pain, denies nausea, vomiting, or diarrhea  GENITOURINARY: No dysuria, frequency, or hematuria  NEUROLOGICAL: no focal weakness or dizziness  MUSCULOSKELETAL: no myalgias     Vital Signs Last 24 Hrs  T(C): 37.5 (08 Oct 2022 21:14), Max: 37.5 (08 Oct 2022 21:14)  T(F): 99.5 (08 Oct 2022 21:14), Max: 99.5 (08 Oct 2022 21:14)  HR: 71 (09 Oct 2022 07:46) (71 - 87)  BP: 130/74 (09 Oct 2022 05:28) (123/54 - 133/62)  BP(mean): --  RR: 18 (09 Oct 2022 05:28) (17 - 18)  SpO2: 96% (09 Oct 2022 05:28) (93% - 97%)    Parameters below as of 09 Oct 2022 05:28  Patient On (Oxygen Delivery Method): room air        PHYSICAL EXAM:  GENERAL: NAD, NG in place  HEENT:  anicteric, moist mucous membranes  CHEST/LUNG:  CTA b/l, no rales, wheezes, or rhonchi  HEART:  RRR, S1, S2  ABDOMEN:  BS+, soft, nontender, nondistended  EXTREMITIES: no edema, cyanosis, or calf tenderness  NERVOUS SYSTEM: AAO x3    LABS:                        8.7    12.50 )-----------( 343      ( 09 Oct 2022 06:38 )             27.2     CBC Full  -  ( 09 Oct 2022 06:38 )  WBC Count : 12.50 K/uL  Hemoglobin : 8.7 g/dL  Hematocrit : 27.2 %  Platelet Count - Automated : 343 K/uL  Mean Cell Volume : 100.4 fl  Mean Cell Hemoglobin : 32.1 pg  Mean Cell Hemoglobin Concentration : 32.0 gm/dL  Auto Neutrophil # : x  Auto Lymphocyte # : x  Auto Monocyte # : x  Auto Eosinophil # : x  Auto Basophil # : x  Auto Neutrophil % : x  Auto Lymphocyte % : x  Auto Monocyte % : x  Auto Eosinophil % : x  Auto Basophil % : x    09 Oct 2022 06:38    142    |  107    |  34     ----------------------------<  94     4.6     |  28     |  1.30     Ca    8.7        09 Oct 2022 06:38    TPro  6.7    /  Alb  2.5    /  TBili  0.5    /  DBili  x      /  AST  10     /  ALT  16     /  AlkPhos  70     09 Oct 2022 06:38    PT/INR - ( 08 Oct 2022 07:52 )   PT: 31.7 sec;   INR: 2.68 ratio           Urinalysis Basic - ( 08 Oct 2022 20:00 )    Color: Yellow / Appearance: Clear / S.025 / pH: x  Gluc: x / Ketone: Negative  / Bili: Negative / Urobili: Negative   Blood: x / Protein: 30 mg/dL / Nitrite: Negative   Leuk Esterase: Negative / RBC: 0-2 /HPF / WBC 0-2   Sq Epi: x / Non Sq Epi: Moderate / Bacteria: Few      CAPILLARY BLOOD GLUCOSE      POCT Blood Glucose.: 94 mg/dL (09 Oct 2022 11:25)  POCT Blood Glucose.: 113 mg/dL (09 Oct 2022 06:53)  POCT Blood Glucose.: 111 mg/dL (09 Oct 2022 00:46)  POCT Blood Glucose.: 102 mg/dL (08 Oct 2022 18:35)        Culture - Sputum (collected 10-07-22 @ 16:00)  Source: .Sputum Sputum  Gram Stain (10-08-22 @ 06:22):    Few polymorphonuclear leukocytes per low power field    No Squamous epithelial cells per low power field    Rare Gram positive cocci in pairs per oil power field    Rare Gram Negative Coccobacilli seen per oil power field  Preliminary Report (10-08-22 @ 20:48):    Normal Respiratory Ginger present    Culture - Blood (collected 10-06-22 @ 22:10)  Source: .Blood Blood-Peripheral  Preliminary Report (10-08-22 @ 04:01):    No growth to date.    Culture - Blood (collected 10-06-22 @ 22:00)  Source: .Blood Blood-Peripheral  Preliminary Report (10-08-22 @ 04:01):    No growth to date.    Culture - Urine (collected 10-05-22 @ 15:30)  Source: Clean Catch Clean Catch (Midstream)  Final Report (10-06-22 @ 18:54):    <10,000 CFU/mL Normal Urogenital Ginger    Culture - Blood (collected 10-05-22 @ 13:40)  Source: .Blood Blood-Peripheral  Preliminary Report (10-06-22 @ 19:01):    No growth to date.    Culture - Blood (collected 10-05-22 @ 13:25)  Source: .Blood Blood-Peripheral  Preliminary Report (10-06-22 @ 19:01):    No growth to date.        RADIOLOGY & ADDITIONAL TESTS:    Personally reviewed.     Consultant(s) Notes Reviewed:  [x] YES  [ ] NO

## 2022-10-09 NOTE — PROGRESS NOTE ADULT - SUBJECTIVE AND OBJECTIVE BOX
SUBJECTIVE:  Patient seen and examined at bedside. Reports feeling better with improvement in abdominal pain and distension. Currently NPO, NG in place. Denies passing flatus, having BM.     Vital Signs Last 24 Hrs  T(C): 37.5 (08 Oct 2022 21:14), Max: 37.5 (08 Oct 2022 21:14)  T(F): 99.5 (08 Oct 2022 21:14), Max: 99.5 (08 Oct 2022 21:14)  HR: 74 (09 Oct 2022 05:28) (73 - 87)  BP: 130/74 (09 Oct 2022 05:28) (123/54 - 133/62)  BP(mean): --  RR: 18 (09 Oct 2022 05:28) (17 - 18)  SpO2: 96% (09 Oct 2022 05:28) (93% - 97%)    Parameters below as of 09 Oct 2022 05:28  Patient On (Oxygen Delivery Method): room air    PHYSICAL EXAM:  GENERAL: NAD, comfortable, NG in place  HEAD:  Atraumatic, Normocephalic  ABDOMEN: Soft, nondistended. Nontender to palpation. +BS  EXT: no calf tenderness b/l.   NEUROLOGY: A&O x 3    acetaminophen     Tablet .. 650 milliGRAM(s) Oral every 6 hours PRN  aMIOdarone    Tablet 100 milliGRAM(s) Oral daily  budesonide  80 MICROgram(s)/formoterol 4.5 MICROgram(s) Inhaler 2 Puff(s) Inhalation two times a day  cefTRIAXone   IVPB 1000 milliGRAM(s) IV Intermittent every 24 hours  dextrose 5%. 1000 milliLiter(s) IV Continuous <Continuous>  dextrose 5%. 1000 milliLiter(s) IV Continuous <Continuous>  dextrose 50% Injectable 25 Gram(s) IV Push once  dextrose 50% Injectable 12.5 Gram(s) IV Push once  dextrose 50% Injectable 25 Gram(s) IV Push once  dextrose Oral Gel 15 Gram(s) Oral once PRN  diatrizoate meglumine/diatrizoate sodium. 100 milliLiter(s) Oral once  diltiazem    Tablet 30 milliGRAM(s) Oral two times a day  gabapentin 400 milliGRAM(s) Oral two times a day  glucagon  Injectable 1 milliGRAM(s) IntraMuscular once  insulin lispro (ADMELOG) corrective regimen sliding scale   SubCutaneous every 6 hours  melatonin 3 milliGRAM(s) Oral at bedtime PRN  montelukast 10 milliGRAM(s) Oral daily  ondansetron Injectable 4 milliGRAM(s) IV Push every 8 hours PRN  pantoprazole  Injectable 40 milliGRAM(s) IV Push two times a day  simvastatin 10 milliGRAM(s) Oral at bedtime  sodium chloride 0.9%. 1000 milliLiter(s) IV Continuous <Continuous>  tiotropium 18 MICROgram(s) Capsule 1 Capsule(s) Inhalation daily  traMADol 50 milliGRAM(s) Oral two times a day    LABS: AM labs pending     RADIOLOGY & ADDITIONAL STUDIES:  < from: CT Abdomen and Pelvis No Cont (10.08.22 @ 16:34) >  IMPRESSION:  1. Multiple dilated loops of small bowel with air-fluid levels. At least 2  sites of transition are identified in the right pelvis. Findings are   consistent  with moderate acute small bowel obstruction.  2. Moderate gastric distention related to the small bowel obstruction.  3. Right lower lobe pneumonia. Follow-up to resolution recommended.  < end of copied text >

## 2022-10-09 NOTE — PROGRESS NOTE ADULT - PROBLEM SELECTOR PLAN 4
Pt with CKD (G3a), baseline GFR ~42, Cr 1.2-1.3 at baseline per chart review  - Cr 1.3 today  - Monitor BMP  - Avoid nephrotoxic medications  - Monitor renal indices

## 2022-10-09 NOTE — PROGRESS NOTE ADULT - ASSESSMENT
81 year old female w/ extensive PMHx admitted for pneumonia, now with CT findings compatible with SBO   81 year old female w/ extensive PMHx admitted for pneumonia, now with CT findings compatible with SBO  Surg. Att.  Pt seen and examined , she feels better. Passed flatus.   Abd is soft and minimally tender. Will follow Gastrografin.

## 2022-10-09 NOTE — PROGRESS NOTE ADULT - PROBLEM SELECTOR PLAN 8
Chronic, stable  - cardizem 30mg bid with hold parameters  - simvastatin 10mg qd  - Monitor routine hemodynamics

## 2022-10-09 NOTE — PROGRESS NOTE ADULT - PROBLEM SELECTOR PLAN 2
Weakness due to community acquired pnemonia.  - Confusion resolved after her fever resolved.   - Rocephin  1 g IV daily with azithromycin 500 mg IV daily  - CXR: 1. Ill-defined increased density in the right lower lobe may be due to the overlying breast shadow however follow-up is recommended to help exclude an evolving right lower lobe infiltrate in this region. 2. No other significant findings.   - CT chest: Right lower lobe consolidation compatible with pneumonia. Recommend CT chest follow-up to ensure clearing. Small right pleural effusion.  - Had IVIG last month as per patient; no urgent need while acutely infected   - Procal 0.69, f/u sputum culture   - Heme (Dr. Parker) consulted, recs appreciated: hold MIRIAM while hospitalized and will resume as an outpatient  - Pt with history of with history of Pulmonary emboli - S/P IVC filter  - ID (Dr. Carter) consulted, recs appreciated  - Dao Gallegos consulted, recs appreciated

## 2022-10-09 NOTE — PROGRESS NOTE ADULT - SUBJECTIVE AND OBJECTIVE BOX
Interval History:  events of above noted  pain improved with NG  Chart reviewed and events noted;   Overnight events:    MEDICATIONS  (STANDING):  aMIOdarone    Tablet 100 milliGRAM(s) Oral daily  budesonide  80 MICROgram(s)/formoterol 4.5 MICROgram(s) Inhaler 2 Puff(s) Inhalation two times a day  cefTRIAXone   IVPB 1000 milliGRAM(s) IV Intermittent every 24 hours  dextrose 5%. 1000 milliLiter(s) (100 mL/Hr) IV Continuous <Continuous>  dextrose 5%. 1000 milliLiter(s) (50 mL/Hr) IV Continuous <Continuous>  dextrose 50% Injectable 25 Gram(s) IV Push once  dextrose 50% Injectable 12.5 Gram(s) IV Push once  dextrose 50% Injectable 25 Gram(s) IV Push once  diltiazem    Tablet 30 milliGRAM(s) Oral two times a day  gabapentin 400 milliGRAM(s) Oral two times a day  glucagon  Injectable 1 milliGRAM(s) IntraMuscular once  insulin lispro (ADMELOG) corrective regimen sliding scale   SubCutaneous every 6 hours  montelukast 10 milliGRAM(s) Oral daily  pantoprazole  Injectable 40 milliGRAM(s) IV Push two times a day  simvastatin 10 milliGRAM(s) Oral at bedtime  sodium chloride 0.9%. 1000 milliLiter(s) (50 mL/Hr) IV Continuous <Continuous>  tiotropium 18 MICROgram(s) Capsule 1 Capsule(s) Inhalation daily  traMADol 50 milliGRAM(s) Oral two times a day    MEDICATIONS  (PRN):  acetaminophen     Tablet .. 650 milliGRAM(s) Oral every 6 hours PRN Temp greater or equal to 38C (100.4F), Mild Pain (1 - 3)  dextrose Oral Gel 15 Gram(s) Oral once PRN Blood Glucose LESS THAN 70 milliGRAM(s)/deciliter  melatonin 3 milliGRAM(s) Oral at bedtime PRN Insomnia  ondansetron Injectable 4 milliGRAM(s) IV Push every 8 hours PRN Nausea and/or Vomiting      Vital Signs Last 24 Hrs  T(C): 37.5 (08 Oct 2022 21:14), Max: 37.5 (08 Oct 2022 21:14)  T(F): 99.5 (08 Oct 2022 21:14), Max: 99.5 (08 Oct 2022 21:14)  HR: 71 (09 Oct 2022 07:46) (71 - 87)  BP: 130/74 (09 Oct 2022 05:28) (123/54 - 133/62)  BP(mean): --  RR: 18 (09 Oct 2022 05:28) (17 - 18)  SpO2: 96% (09 Oct 2022 05:28) (93% - 97%)    Parameters below as of 09 Oct 2022 05:28  Patient On (Oxygen Delivery Method): room air        PHYSICAL EXAM  General: adult in NAD NG tube in place  HEENT: clear oropharynx, anicteric sclera, pink conjunctivae  Neck: supple  CV: normal S1S2 with no murmur rubs or gallops  Lungs: clear to auscultation, no wheezes, no rhales  Abdomen: soft non-tender non-distended, no hepato/splenomegaly  Ext: no clubbing cyanosis or edema  Skin: no rashes and no petichiae  Neuro: alert and oriented X3 no focal deficits      LABS:  CBC Full  -  ( 09 Oct 2022 06:38 )  WBC Count : 12.50 K/uL  RBC Count : 2.71 M/uL  Hemoglobin : 8.7 g/dL  Hematocrit : 27.2 %  Platelet Count - Automated : 343 K/uL  Mean Cell Volume : 100.4 fl  Mean Cell Hemoglobin : 32.1 pg  Mean Cell Hemoglobin Concentration : 32.0 gm/dL  Auto Neutrophil # : x  Auto Lymphocyte # : x  Auto Monocyte # : x  Auto Eosinophil # : x  Auto Basophil # : x  Auto Neutrophil % : x  Auto Lymphocyte % : x  Auto Monocyte % : x  Auto Eosinophil % : x  Auto Basophil % : x    10-09    142  |  107  |  34<H>  ----------------------------<  94  4.6   |  28  |  1.30    Ca    8.7      09 Oct 2022 06:38    TPro  6.7  /  Alb  2.5<L>  /  TBili  0.5  /  DBili  x   /  AST  10<L>  /  ALT  16  /  AlkPhos  70  10-09    PT/INR - ( 08 Oct 2022 07:52 )   PT: 31.7 sec;   INR: 2.68 ratio             fe studies      WBC trend  12.50 K/uL (10-09-22 @ 06:38)  15.25 K/uL (10-08-22 @ 07:52)  16.92 K/uL (10-07-22 @ 06:50)      Hgb trend  8.7 g/dL (10-09-22 @ 06:38)  9.1 g/dL (10-08-22 @ 07:52)  7.5 g/dL (10-07-22 @ 06:50)      plt trend  343 K/uL (10-09-22 @ 06:38)  302 K/uL (10-08-22 @ 07:52)  221 K/uL (10-07-22 @ 06:50)        RADIOLOGY & ADDITIONAL STUDIES:

## 2022-10-09 NOTE — PROGRESS NOTE ADULT - PROBLEM SELECTOR PLAN 1
Patient c/o abdominal pain, nausea, vomiting on 10/7 2/2 small bowel obstruction  - Abdominal x-ray (10/8): suspicion for small bowel obstruction  - CT a/p: 1. Multiple dilated loops of small bowel with air-fluid levels. At least 2 sites of transition are identified in the right pelvis. Findings are consistent with moderate acute small bowel obstruction. 2. Moderate gastric distention related to the small bowel obstruction. 3. Right lower lobe pneumonia. Follow-up to resolution recommended.  - NPO  - IVF  - NGT; Monitor and record NGT output  - F/u small bowel series with gastrografin today  - GI (Dr. Marquis) consulted, recs appreciated  - Surgery consulted, recs reppreciated

## 2022-10-10 LAB
ALBUMIN SERPL ELPH-MCNC: 2.5 G/DL — LOW (ref 3.3–5)
ALP SERPL-CCNC: 69 U/L — SIGNIFICANT CHANGE UP (ref 40–120)
ALT FLD-CCNC: 14 U/L — SIGNIFICANT CHANGE UP (ref 12–78)
ANION GAP SERPL CALC-SCNC: 7 MMOL/L — SIGNIFICANT CHANGE UP (ref 5–17)
AST SERPL-CCNC: 10 U/L — LOW (ref 15–37)
BASOPHILS # BLD AUTO: 0 K/UL — SIGNIFICANT CHANGE UP (ref 0–0.2)
BASOPHILS NFR BLD AUTO: 0 % — SIGNIFICANT CHANGE UP (ref 0–2)
BILIRUB SERPL-MCNC: 0.4 MG/DL — SIGNIFICANT CHANGE UP (ref 0.2–1.2)
BUN SERPL-MCNC: 33 MG/DL — HIGH (ref 7–23)
CALCIUM SERPL-MCNC: 8.7 MG/DL — SIGNIFICANT CHANGE UP (ref 8.5–10.1)
CHLORIDE SERPL-SCNC: 113 MMOL/L — HIGH (ref 96–108)
CO2 SERPL-SCNC: 26 MMOL/L — SIGNIFICANT CHANGE UP (ref 22–31)
CREAT SERPL-MCNC: 1.1 MG/DL — SIGNIFICANT CHANGE UP (ref 0.5–1.3)
CULTURE RESULTS: SIGNIFICANT CHANGE UP
CULTURE RESULTS: SIGNIFICANT CHANGE UP
EGFR: 51 ML/MIN/1.73M2 — LOW
EOSINOPHIL # BLD AUTO: 0 K/UL — SIGNIFICANT CHANGE UP (ref 0–0.5)
EOSINOPHIL NFR BLD AUTO: 0 % — SIGNIFICANT CHANGE UP (ref 0–6)
GLUCOSE SERPL-MCNC: 92 MG/DL — SIGNIFICANT CHANGE UP (ref 70–99)
HCT VFR BLD CALC: 26 % — LOW (ref 34.5–45)
HGB BLD-MCNC: 8 G/DL — LOW (ref 11.5–15.5)
INR BLD: 3.42 RATIO — HIGH (ref 0.88–1.16)
LEGIONELLA AG UR QL: NEGATIVE — SIGNIFICANT CHANGE UP
LYMPHOCYTES # BLD AUTO: 0.93 K/UL — LOW (ref 1–3.3)
LYMPHOCYTES # BLD AUTO: 13 % — SIGNIFICANT CHANGE UP (ref 13–44)
MCHC RBC-ENTMCNC: 30.8 GM/DL — LOW (ref 32–36)
MCHC RBC-ENTMCNC: 31.3 PG — SIGNIFICANT CHANGE UP (ref 27–34)
MCV RBC AUTO: 101.6 FL — HIGH (ref 80–100)
MONOCYTES # BLD AUTO: 0.71 K/UL — SIGNIFICANT CHANGE UP (ref 0–0.9)
MONOCYTES NFR BLD AUTO: 10 % — SIGNIFICANT CHANGE UP (ref 2–14)
MRSA PCR RESULT.: SIGNIFICANT CHANGE UP
NEUTROPHILS # BLD AUTO: 5.48 K/UL — SIGNIFICANT CHANGE UP (ref 1.8–7.4)
NEUTROPHILS NFR BLD AUTO: 76 % — SIGNIFICANT CHANGE UP (ref 43–77)
NRBC # BLD: SIGNIFICANT CHANGE UP /100 WBCS (ref 0–0)
PLATELET # BLD AUTO: 354 K/UL — SIGNIFICANT CHANGE UP (ref 150–400)
POTASSIUM SERPL-MCNC: 4.6 MMOL/L — SIGNIFICANT CHANGE UP (ref 3.5–5.3)
POTASSIUM SERPL-SCNC: 4.6 MMOL/L — SIGNIFICANT CHANGE UP (ref 3.5–5.3)
PROT SERPL-MCNC: 6.6 G/DL — SIGNIFICANT CHANGE UP (ref 6–8.3)
PROTHROM AB SERPL-ACNC: 40.5 SEC — HIGH (ref 10.5–13.4)
RBC # BLD: 2.56 M/UL — LOW (ref 3.8–5.2)
RBC # FLD: 24.4 % — HIGH (ref 10.3–14.5)
S AUREUS DNA NOSE QL NAA+PROBE: SIGNIFICANT CHANGE UP
SODIUM SERPL-SCNC: 146 MMOL/L — HIGH (ref 135–145)
SPECIMEN SOURCE: SIGNIFICANT CHANGE UP
SPECIMEN SOURCE: SIGNIFICANT CHANGE UP
WBC # BLD: 7.12 K/UL — SIGNIFICANT CHANGE UP (ref 3.8–10.5)
WBC # FLD AUTO: 7.12 K/UL — SIGNIFICANT CHANGE UP (ref 3.8–10.5)

## 2022-10-10 PROCEDURE — 99232 SBSQ HOSP IP/OBS MODERATE 35: CPT

## 2022-10-10 PROCEDURE — 99231 SBSQ HOSP IP/OBS SF/LOW 25: CPT

## 2022-10-10 PROCEDURE — 99233 SBSQ HOSP IP/OBS HIGH 50: CPT

## 2022-10-10 PROCEDURE — 74019 RADEX ABDOMEN 2 VIEWS: CPT | Mod: 26

## 2022-10-10 RX ORDER — METOPROLOL TARTRATE 50 MG
2.5 TABLET ORAL EVERY 6 HOURS
Refills: 0 | Status: DISCONTINUED | OUTPATIENT
Start: 2022-10-10 | End: 2022-10-10

## 2022-10-10 RX ORDER — SODIUM CHLORIDE 9 MG/ML
1000 INJECTION, SOLUTION INTRAVENOUS
Refills: 0 | Status: DISCONTINUED | OUTPATIENT
Start: 2022-10-10 | End: 2022-10-11

## 2022-10-10 RX ORDER — METOPROLOL TARTRATE 50 MG
2.5 TABLET ORAL EVERY 6 HOURS
Refills: 0 | Status: DISCONTINUED | OUTPATIENT
Start: 2022-10-10 | End: 2022-10-11

## 2022-10-10 RX ORDER — SODIUM CHLORIDE 9 MG/ML
1000 INJECTION, SOLUTION INTRAVENOUS
Refills: 0 | Status: DISCONTINUED | OUTPATIENT
Start: 2022-10-10 | End: 2022-10-10

## 2022-10-10 RX ADMIN — SODIUM CHLORIDE 75 MILLILITER(S): 9 INJECTION, SOLUTION INTRAVENOUS at 23:24

## 2022-10-10 RX ADMIN — Medication 2.5 MILLIGRAM(S): at 23:54

## 2022-10-10 RX ADMIN — Medication 2.5 MILLIGRAM(S): at 13:07

## 2022-10-10 RX ADMIN — Medication 2.5 MILLIGRAM(S): at 18:37

## 2022-10-10 RX ADMIN — BUDESONIDE AND FORMOTEROL FUMARATE DIHYDRATE 2 PUFF(S): 160; 4.5 AEROSOL RESPIRATORY (INHALATION) at 07:58

## 2022-10-10 RX ADMIN — PANTOPRAZOLE SODIUM 40 MILLIGRAM(S): 20 TABLET, DELAYED RELEASE ORAL at 18:37

## 2022-10-10 RX ADMIN — TIOTROPIUM BROMIDE 1 CAPSULE(S): 18 CAPSULE ORAL; RESPIRATORY (INHALATION) at 07:57

## 2022-10-10 NOTE — PROGRESS NOTE ADULT - SUBJECTIVE AND OBJECTIVE BOX
[INTERVAL HX: ]  Patient seen and examined;  Chart reviewed and events noted;     [HEALTH ISSUES]      HEALTH ISSUES - PROBLEM Dx:  KIMBERLY (acute kidney injury)    COPD, mild    Paroxysmal atrial fibrillation    Aplastic anemia    Diabetes mellitus    Weakness    Sepsis    Need for prophylactic measure    Chronic pain    Hypertension    Constipation    GERD (gastroesophageal reflux disease)    Anemia secondary to renal failure    Other myelodysplastic syndromes    Abdominal pain    Small bowel obstruction            [MEDICATIONS]  MEDICATIONS  (STANDING):  aMIOdarone    Tablet 100 milliGRAM(s) Oral daily  budesonide  80 MICROgram(s)/formoterol 4.5 MICROgram(s) Inhaler 2 Puff(s) Inhalation two times a day  dextrose 5% + sodium chloride 0.45%. 1000 milliLiter(s) (75 mL/Hr) IV Continuous <Continuous>  dextrose 5%. 1000 milliLiter(s) (100 mL/Hr) IV Continuous <Continuous>  dextrose 5%. 1000 milliLiter(s) (50 mL/Hr) IV Continuous <Continuous>  dextrose 50% Injectable 25 Gram(s) IV Push once  dextrose 50% Injectable 12.5 Gram(s) IV Push once  dextrose 50% Injectable 25 Gram(s) IV Push once  diltiazem    Tablet 30 milliGRAM(s) Oral two times a day  gabapentin 400 milliGRAM(s) Oral two times a day  glucagon  Injectable 1 milliGRAM(s) IntraMuscular once  insulin lispro (ADMELOG) corrective regimen sliding scale   SubCutaneous every 6 hours  metoprolol tartrate Injectable 2.5 milliGRAM(s) IV Push every 6 hours  montelukast 10 milliGRAM(s) Oral daily  pantoprazole  Injectable 40 milliGRAM(s) IV Push two times a day  simvastatin 10 milliGRAM(s) Oral at bedtime  tiotropium 18 MICROgram(s) Capsule 1 Capsule(s) Inhalation daily  traMADol 50 milliGRAM(s) Oral two times a day    MEDICATIONS  (PRN):  acetaminophen     Tablet .. 650 milliGRAM(s) Oral every 6 hours PRN Temp greater or equal to 38C (100.4F), Mild Pain (1 - 3)  dextrose Oral Gel 15 Gram(s) Oral once PRN Blood Glucose LESS THAN 70 milliGRAM(s)/deciliter  ondansetron Injectable 4 milliGRAM(s) IV Push every 8 hours PRN Nausea and/or Vomiting      [VITALS]  Vital Signs Last 24 Hrs  T(C): 36.8 (10 Oct 2022 20:56), Max: 36.8 (10 Oct 2022 05:20)  T(F): 98.3 (10 Oct 2022 20:56), Max: 98.3 (10 Oct 2022 20:56)  HR: 70 (10 Oct 2022 20:56) (70 - 76)  BP: 130/68 (10 Oct 2022 20:56) (128/58 - 145/75)  BP(mean): --  RR: 18 (10 Oct 2022 20:) (18 - 19)  SpO2: 93% (10 Oct 2022 20:56) (93% - 96%)    Parameters below as of 10 Oct 2022 20:56  Patient On (Oxygen Delivery Method): nasal cannula      [WT/HT]  Daily     Daily Weight in k.4 (10 Oct 2022 05:20)  [VENT]      [PHYSICAL EXAM]  General: WN,  WD adult in NAD.  HEENT:  anicteric sclera, pink conjunctivae,   xxxxx clear oropharynx  Neck: supple, no masses.  CV: normal S1S2, no murmur, no rubs, no gallops.  Lungs: clear to auscultation, no wheezes, no rales, no rhonchi.  Abdomen: soft, non-tender, non-distended, no hepatosplenomegaly, normal BS, no guarding.  Ext: no clubbing, no cyanosis, no edema.  Skin: no rashes,  no petechiae, no venous stasis changes.  Neuro: alert and oriented X xxxx, no focal motor deficits.  LN: no SC LILLIAN.    [LABS:]                        8.0    7.12  )-----------( 354      ( 10 Oct 2022 07:30 )             26.0     10-10    146<H>  |  113<H>  |  33<H>  ----------------------------<  92  4.6   |  26  |  1.10    Ca    8.7      10 Oct 2022 07:30    TPro  6.6  /  Alb  2.5<L>  /  TBili  0.4  /  DBili  x   /  AST  10<L>  /  ALT  14  /  AlkPhos  69  10-10    PT/INR - ( 10 Oct 2022 07:30 )   PT: 40.5 sec;   INR: 3.42 ratio       Iron - Total Binding Capacity.: 230 ug/dL [220 - 430] (22 @ 14:05)  Ferritin, Serum: 1349 ng/mL *H* [15 - 150] (22 @ 14:05)        COVID-19 PCR: NotDetec (05 Oct 2022 13:40)  SARS-CoV-2: NotDetec (05 Oct 2022 13:40)  COVID-19 PCR: NotDetec (2022 07:32)  SARS-CoV-2: NotDetec (2022 12:17)          [RADIOLOGY STUDIES:]     [INTERVAL HX: ]  Patient seen and examined;  Chart reviewed and events noted;     [HEALTH ISSUES]      HEALTH ISSUES - PROBLEM Dx:  KIMBERLY (acute kidney injury)  COPD, mild  Paroxysmal atrial fibrillation  Aplastic anemia  Diabetes mellitus  Weakness  Sepsis  Need for prophylactic measure  Chronic pain  Hypertension  Constipation  GERD (gastroesophageal reflux disease)  Anemia secondary to renal failure  Other myelodysplastic syndromes  Abdominal pain  Small bowel obstruction      [MEDICATIONS]  MEDICATIONS  (STANDING):  aMIOdarone    Tablet 100 milliGRAM(s) Oral daily  budesonide  80 MICROgram(s)/formoterol 4.5 MICROgram(s) Inhaler 2 Puff(s) Inhalation two times a day  dextrose 5% + sodium chloride 0.45%. 1000 milliLiter(s) (75 mL/Hr) IV Continuous <Continuous>  dextrose 5%. 1000 milliLiter(s) (100 mL/Hr) IV Continuous <Continuous>  dextrose 5%. 1000 milliLiter(s) (50 mL/Hr) IV Continuous <Continuous>  dextrose 50% Injectable 25 Gram(s) IV Push once  dextrose 50% Injectable 12.5 Gram(s) IV Push once  dextrose 50% Injectable 25 Gram(s) IV Push once  diltiazem    Tablet 30 milliGRAM(s) Oral two times a day  gabapentin 400 milliGRAM(s) Oral two times a day  glucagon  Injectable 1 milliGRAM(s) IntraMuscular once  insulin lispro (ADMELOG) corrective regimen sliding scale   SubCutaneous every 6 hours  metoprolol tartrate Injectable 2.5 milliGRAM(s) IV Push every 6 hours  montelukast 10 milliGRAM(s) Oral daily  pantoprazole  Injectable 40 milliGRAM(s) IV Push two times a day  simvastatin 10 milliGRAM(s) Oral at bedtime  tiotropium 18 MICROgram(s) Capsule 1 Capsule(s) Inhalation daily  traMADol 50 milliGRAM(s) Oral two times a day    MEDICATIONS  (PRN):  acetaminophen     Tablet .. 650 milliGRAM(s) Oral every 6 hours PRN Temp greater or equal to 38C (100.4F), Mild Pain (1 - 3)  dextrose Oral Gel 15 Gram(s) Oral once PRN Blood Glucose LESS THAN 70 milliGRAM(s)/deciliter  ondansetron Injectable 4 milliGRAM(s) IV Push every 8 hours PRN Nausea and/or Vomiting      [VITALS]  Vital Signs Last 24 Hrs  T(C): 36.8 (10 Oct 2022 20:56), Max: 36.8 (10 Oct 2022 05:20)  T(F): 98.3 (10 Oct 2022 20:56), Max: 98.3 (10 Oct 2022 20:56)  HR: 70 (10 Oct 2022 20:56) (70 - 76)  BP: 130/68 (10 Oct 2022 20:56) (128/58 - 145/75)  BP(mean): --  RR: 18 (10 Oct 2022 20:) (18 - 19)  SpO2: 93% (10 Oct 2022 20:56) (93% - 96%)    Parameters below as of 10 Oct 2022 20:56  Patient On (Oxygen Delivery Method): nasal cannula      [WT/HT]  Daily     Daily Weight in k.4 (10 Oct 2022 05:20)  [VENT]      [PHYSICAL EXAM]  General: WN,  WD adult in NAD.  HEENT:  anicteric sclera, pink conjunctivae,   xxxxx clear oropharynx  Neck: supple, no masses.  CV: normal S1S2, no murmur, no rubs, no gallops.  Lungs: clear to auscultation, no wheezes, no rales, no rhonchi.  Abdomen: soft, non-tender, non-distended, no hepatosplenomegaly, normal BS, no guarding.  Ext: no clubbing, no cyanosis, no edema.  Skin: no rashes,  no petechiae, no venous stasis changes.  Neuro: alert and oriented X xxxx, no focal motor deficits.  LN: no SC LILLIAN.    [LABS:]                        8.0    7.12  )-----------( 354      ( 10 Oct 2022 07:30 )             26.0     10-10    146<H>  |  113<H>  |  33<H>  ----------------------------<  92  4.6   |  26  |  1.10    Ca    8.7      10 Oct 2022 07:30    TPro  6.6  /  Alb  2.5<L>  /  TBili  0.4  /  DBili  x   /  AST  10<L>  /  ALT  14  /  AlkPhos  69  10-10    PT/INR - ( 10 Oct 2022 07:30 )   PT: 40.5 sec;   INR: 3.42 ratio       Iron - Total Binding Capacity.: 230 ug/dL [220 - 430] (22 @ 14:05)  Ferritin, Serum: 1349 ng/mL *H* [15 - 150] (22 @ 14:05)        COVID-19 PCR: NotDetec (05 Oct 2022 13:40)  SARS-CoV-2: NotDetec (05 Oct 2022 13:40)  COVID-19 PCR: NotDetec (2022 07:32)  SARS-CoV-2: NotDetec (2022 12:17)          [RADIOLOGY STUDIES:]

## 2022-10-10 NOTE — PROGRESS NOTE ADULT - ASSESSMENT
81-year-old obese female past medical history previously diagnosied with DMII (no longer current), HTN, GERD, COPD not on home O2, pAfib s/p ablation currently on Coumadin, Aplastic Anemia, CKD, HFpEF admitted for pneumonia, on IV Rocephin and Azithro, course c/b SBO.

## 2022-10-10 NOTE — PROGRESS NOTE ADULT - SUBJECTIVE AND OBJECTIVE BOX
Patient is a 81y old  Female who presents with a chief complaint of Pneumonia (10 Oct 2022 16:15)      INTERVAL HPI/OVERNIGHT EVENTS:    continues to have productive cough. NG drainage has diminished    MEDICATIONS  (STANDING):  aMIOdarone    Tablet 100 milliGRAM(s) Oral daily  budesonide  80 MICROgram(s)/formoterol 4.5 MICROgram(s) Inhaler 2 Puff(s) Inhalation two times a day  dextrose 5% + sodium chloride 0.45%. 1000 milliLiter(s) (75 mL/Hr) IV Continuous <Continuous>  dextrose 5%. 1000 milliLiter(s) (50 mL/Hr) IV Continuous <Continuous>  dextrose 5%. 1000 milliLiter(s) (100 mL/Hr) IV Continuous <Continuous>  dextrose 50% Injectable 25 Gram(s) IV Push once  dextrose 50% Injectable 12.5 Gram(s) IV Push once  dextrose 50% Injectable 25 Gram(s) IV Push once  diltiazem    Tablet 30 milliGRAM(s) Oral two times a day  gabapentin 400 milliGRAM(s) Oral two times a day  glucagon  Injectable 1 milliGRAM(s) IntraMuscular once  insulin lispro (ADMELOG) corrective regimen sliding scale   SubCutaneous every 6 hours  metoprolol tartrate Injectable 2.5 milliGRAM(s) IV Push every 6 hours  montelukast 10 milliGRAM(s) Oral daily  pantoprazole  Injectable 40 milliGRAM(s) IV Push two times a day  simvastatin 10 milliGRAM(s) Oral at bedtime  tiotropium 18 MICROgram(s) Capsule 1 Capsule(s) Inhalation daily  traMADol 50 milliGRAM(s) Oral two times a day      MEDICATIONS  (PRN):  acetaminophen     Tablet .. 650 milliGRAM(s) Oral every 6 hours PRN Temp greater or equal to 38C (100.4F), Mild Pain (1 - 3)  dextrose Oral Gel 15 Gram(s) Oral once PRN Blood Glucose LESS THAN 70 milliGRAM(s)/deciliter  ondansetron Injectable 4 milliGRAM(s) IV Push every 8 hours PRN Nausea and/or Vomiting      Allergies    No Known Allergies    Intolerances        PAST MEDICAL & SURGICAL HISTORY:  COPD (chronic obstructive pulmonary disease)      DM (diabetes mellitus)      PAF (paroxysmal atrial fibrillation)  s/p ablation      Hiatal hernia      GIB (gastrointestinal bleeding)      S/P hysterectomy      S/P foot surgery      S/P knee surgery      After cataract  bilateral eye cataract surgically removed      Closed right hip fracture  [facility] hip ORIF 2016      S/P IVC filter  2016      S/P carpal tunnel release  Right  &amp; 17          Vital Signs Last 24 Hrs  T(C): 36.8 (10 Oct 2022 12:11), Max: 37.2 (09 Oct 2022 20:50)  T(F): 98.2 (10 Oct 2022 12:11), Max: 99 (09 Oct 2022 20:50)  HR: 70 (10 Oct 2022 18:25) (70 - 76)  BP: 128/58 (10 Oct 2022 18:25) (128/58 - 145/75)  BP(mean): --  RR: 18 (10 Oct 2022 12:11) (18 - 19)  SpO2: 94% (10 Oct 2022 12:11) (94% - 97%)    Parameters below as of 10 Oct 2022 12:11  Patient On (Oxygen Delivery Method): nasal cannula  O2 Flow (L/min): 2      PHYSICAL EXAMINATION:    GENERAL: The patient is awake and alert in no apparent distress.     HEENT: Head is normocephalic and atraumatic.    NECK: no JVD    LUNGS: diminished air entry bilateral    HEART: Irregular rate and rhythm without murmur.    ABDOMEN: positive bowel sounds; no tenderness      EXTREMITIES: Without any cyanosis, clubbing, rash, lesions or edema.    NEUROLOGIC: Grossly intact.    SKIN: No ulceration or induration present.      LABS:                        8.0    7.12  )-----------( 354      ( 10 Oct 2022 07:30 )             26.0     10-10    146<H>  |  113<H>  |  33<H>  ----------------------------<  92  4.6   |  26  |  1.10    Ca    8.7      10 Oct 2022 07:30    TPro  6.6  /  Alb  2.5<L>  /  TBili  0.4  /  DBili  x   /  AST  10<L>  /  ALT  14  /  AlkPhos  69  10-10    PT/INR - ( 10 Oct 2022 07:30 )   PT: 40.5 sec;   INR: 3.42 ratio           Urinalysis Basic - ( 08 Oct 2022 20:00 )    Color: Yellow / Appearance: Clear / S.025 / pH: x  Gluc: x / Ketone: Negative  / Bili: Negative / Urobili: Negative   Blood: x / Protein: 30 mg/dL / Nitrite: Negative   Leuk Esterase: Negative / RBC: 0-2 /HPF / WBC 0-2   Sq Epi: x / Non Sq Epi: Moderate / Bacteria: Few                  Procalcitonin, Serum: 0.69 ng/mL (10-08-22 @ 07:52)      MICROBIOLOGY:  Culture Results:   Normal Respiratory Ginger present (10-07 @ 16:00)  Culture Results:   No growth to date. (10-06 @ 22:10)  Culture Results:   No growth to date. (10-06 @ 22:00)        Assessment:    Resolving Pneumonia  Resolving Small bowel obstruction  Hx COPD  Acute Hypoxic respiratory failure  Hx Pulmonary emboli/DVT - S/P IVC filter  Atrial fibrillation    Plan:    NG drainage  Supplemental oxygen  Complete course of IV antibiotics  Symbicort + Spiriva inhalers

## 2022-10-10 NOTE — PROGRESS NOTE ADULT - SUBJECTIVE AND OBJECTIVE BOX
[facility] -- Sai Valle Pannella, Patel, Savella, Goodger: Office # [phone]    Follow Up:  Pneumonia     Subjective/Observations: Patient seen and examined. Patient awake, alert, resting in bed. No complaints of chest pain, dyspnea, palpitations or dizziness. No signs of orthopnea or PND. Tolerating O2 via nasal cannula. NGT in place.     REVIEW OF SYSTEMS: All other review of systems are negative unless indicated above    PAST MEDICAL & SURGICAL HISTORY:  COPD (chronic obstructive pulmonary disease)      DM (diabetes mellitus)      PAF (paroxysmal atrial fibrillation)  s/p ablation      Hiatal hernia      GIB (gastrointestinal bleeding)      S/P hysterectomy      S/P foot surgery      S/P knee surgery      After cataract  bilateral eye cataract surgically removed      Closed right hip fracture  [facility] hip ORIF july 19 2016      S/P IVC filter  nov 2016      S/P carpal tunnel release  Right 2008 &amp; 6/27/17    MEDICATIONS  (STANDING):  aMIOdarone    Tablet 100 milliGRAM(s) Oral daily  budesonide  80 MICROgram(s)/formoterol 4.5 MICROgram(s) Inhaler 2 Puff(s) Inhalation two times a day  dextrose 5%. 1000 milliLiter(s) (50 mL/Hr) IV Continuous <Continuous>  dextrose 5%. 1000 milliLiter(s) (100 mL/Hr) IV Continuous <Continuous>  dextrose 50% Injectable 25 Gram(s) IV Push once  dextrose 50% Injectable 12.5 Gram(s) IV Push once  dextrose 50% Injectable 25 Gram(s) IV Push once  diltiazem    Tablet 30 milliGRAM(s) Oral two times a day  gabapentin 400 milliGRAM(s) Oral two times a day  glucagon  Injectable 1 milliGRAM(s) IntraMuscular once  insulin lispro (ADMELOG) corrective regimen sliding scale   SubCutaneous every 6 hours  montelukast 10 milliGRAM(s) Oral daily  pantoprazole  Injectable 40 milliGRAM(s) IV Push two times a day  simvastatin 10 milliGRAM(s) Oral at bedtime  tiotropium 18 MICROgram(s) Capsule 1 Capsule(s) Inhalation daily  traMADol 50 milliGRAM(s) Oral two times a day    MEDICATIONS  (PRN):  acetaminophen     Tablet .. 650 milliGRAM(s) Oral every 6 hours PRN Temp greater or equal to 38C (100.4F), Mild Pain (1 - 3)  dextrose Oral Gel 15 Gram(s) Oral once PRN Blood Glucose LESS THAN 70 milliGRAM(s)/deciliter  ondansetron Injectable 4 milliGRAM(s) IV Push every 8 hours PRN Nausea and/or Vomiting    Allergies    No Known Allergies    Intolerances      Vital Signs Last 24 Hrs  T(C): 36.8 (10 Oct 2022 05:20), Max: 37.2 (09 Oct 2022 20:50)  T(F): 98.2 (10 Oct 2022 05:20), Max: 99 (09 Oct 2022 20:50)  HR: 76 (10 Oct 2022 05:20) (73 - 76)  BP: 130/67 (10 Oct 2022 05:20) (108/68 - 131/70)  BP(mean): --  RR: 19 (10 Oct 2022 05:20) (18 - 19)  SpO2: 96% (10 Oct 2022 05:20) (94% - 97%)    Parameters below as of 10 Oct 2022 05:20  Patient On (Oxygen Delivery Method): nasal cannula      I&O's Summary    09 Oct 2022 07:01  -  10 Oct 2022 07:00  --------------------------------------------------------  IN: 975 mL / OUT: 1300 mL / NET: -325 mL          TELE: SR 70s   PHYSICAL EXAM:  Constitutional: NAD, awake and alert, Obese   HEENT: Moist Mucous Membranes, Anicteric  Pulmonary: Non-labored, breath sounds are clear bilaterally, No wheezing, rales or rhonchi  Cardiovascular: Regular, S1 and S2, No murmurs, No rubs, gallops or clicks  Gastrointestinal:  soft, nontender, nondistended   Lymph: No peripheral edema. No lymphadenopathy.   Skin: No visible rashes or ulcers.  Psych:  Mood & affect appropriate      LABS: All Labs Reviewed:                        8.0    7.12  )-----------( 354      ( 10 Oct 2022 07:30 )             26.0                         8.7    12.50 )-----------( 343      ( 09 Oct 2022 06:38 )             27.2                         9.1    15.25 )-----------( 302      ( 08 Oct 2022 07:52 )             28.2     10 Oct 2022 07:30    146    |  113    |  33     ----------------------------<  92     4.6     |  26     |  1.10   09 Oct 2022 06:38    142    |  107    |  34     ----------------------------<  94     4.6     |  28     |  1.30   08 Oct 2022 07:52    139    |  107    |  35     ----------------------------<  114    4.7     |  24     |  1.60     Ca    8.7        10 Oct 2022 07:30  Ca    8.7        09 Oct 2022 06:38  Ca    9.3        08 Oct 2022 07:52    TPro  6.6    /  Alb  2.5    /  TBili  0.4    /  DBili  x      /  AST  10     /  ALT  14     /  AlkPhos  69     10 Oct 2022 07:30  TPro  6.7    /  Alb  2.5    /  TBili  0.5    /  DBili  x      /  AST  10     /  ALT  16     /  AlkPhos  70     09 Oct 2022 06:38   LIVER FUNCTIONS - ( 10 Oct 2022 07:30 )  Alb: 2.5 g/dL / Pro: 6.6 g/dL / ALK PHOS: 69 U/L / ALT: 14 U/L / AST: 10 U/L / GGT: x           PT/INR - ( 10 Oct 2022 07:30 )   PT: 40.5 sec;   INR: 3.42 ratio           12 Lead ECG:   Ventricular Rate 79 BPM    Atrial Rate 79 BPM    P-R Interval 136 ms    QRS Duration 78 ms    Q-T Interval 384 ms    QTC Calculation(Bazett) 440 ms    P Axis 85 degrees    R Axis -31 degrees    T Axis 72 degrees    Diagnosis Line Normal sinus rhythm  Left axis deviation  Confirmed by DAVID MELGOZA ([id number]) on 10/5/2022 5:32:59 PM (10-05-22 @ 13:06)       EXAM:  ECHO TTE W/O CON COMP W/DOPPLR           PROCEDURE DATE:  07/17/2016      INTERPRETATION:  Ordering Physician: LIA JESSE    Indication: Syncope    Study Quality: Technically difficult and limited     Height: 167 cm  Weight: 91 kg  BSA: 2.0 sq m  Blood Pressure: 104/70    MEASUREMENTS  IVS: 1.4cm  PWT: 1.4cm  LA: 4.4cm  AO: 3.6cm  LVIDd: 4.6cm  LVIDs: 2.4cm    FINDINGS  Left Ventricle: The endocardium is not well-visualized. Grossly, there   appears to be normal left ventricular systolic function.  Aortic Valve: Calcified trileaflet aortic valve with normal opening. No   significant aortic insufficiency.  Mitral Valve: Mitral annular calcification and calcified mitral valve   leaflets. Mild mitral insufficiency.  Tricuspid Valve: Not well visualized. Grossly normal. Mild tricuspid   insufficiency.  Pulmonic Valve: Not well visualized. Mild pulmonic insufficiency.  Left Atrium: Enlarged  Right Ventricle: Normal right ventricular size and systolic function.  Right Atrium: Enlarged  Diastolic function: Grade 1 diastolic dysfunction.    DAVID MELGOZA M.D., ATTENDING CARDIOLOGIST  This document has been electronically signed. Jul 18 2016  1:12P

## 2022-10-10 NOTE — PROGRESS NOTE ADULT - SUBJECTIVE AND OBJECTIVE BOX
Patient is a 81y old  Female who presents with a chief complaint of Pneumonia (07 Oct 2022 11:21)    Patient seen in follow up for CKD.        PAST MEDICAL HISTORY:  COPD (chronic obstructive pulmonary disease)    DM (diabetes mellitus)    Pulmonary fibrosis    PAF (paroxysmal atrial fibrillation)    Hiatal hernia    GIB (gastrointestinal bleeding)    Pericarditis    Shoulder fracture, right    Hematoma        MEDICATIONS  (STANDING):  aMIOdarone    Tablet 100 milliGRAM(s) Oral daily  budesonide  80 MICROgram(s)/formoterol 4.5 MICROgram(s) Inhaler 2 Puff(s) Inhalation two times a day  dextrose 5% + sodium chloride 0.45%. 1000 milliLiter(s) (75 mL/Hr) IV Continuous <Continuous>  dextrose 5%. 1000 milliLiter(s) (50 mL/Hr) IV Continuous <Continuous>  dextrose 5%. 1000 milliLiter(s) (100 mL/Hr) IV Continuous <Continuous>  dextrose 50% Injectable 25 Gram(s) IV Push once  dextrose 50% Injectable 12.5 Gram(s) IV Push once  dextrose 50% Injectable 25 Gram(s) IV Push once  diltiazem    Tablet 30 milliGRAM(s) Oral two times a day  gabapentin 400 milliGRAM(s) Oral two times a day  glucagon  Injectable 1 milliGRAM(s) IntraMuscular once  insulin lispro (ADMELOG) corrective regimen sliding scale   SubCutaneous every 6 hours  metoprolol tartrate Injectable 2.5 milliGRAM(s) IV Push every 6 hours  montelukast 10 milliGRAM(s) Oral daily  pantoprazole  Injectable 40 milliGRAM(s) IV Push two times a day  simvastatin 10 milliGRAM(s) Oral at bedtime  tiotropium 18 MICROgram(s) Capsule 1 Capsule(s) Inhalation daily  traMADol 50 milliGRAM(s) Oral two times a day    MEDICATIONS  (PRN):  acetaminophen     Tablet .. 650 milliGRAM(s) Oral every 6 hours PRN Temp greater or equal to 38C (100.4F), Mild Pain (1 - 3)  dextrose Oral Gel 15 Gram(s) Oral once PRN Blood Glucose LESS THAN 70 milliGRAM(s)/deciliter  ondansetron Injectable 4 milliGRAM(s) IV Push every 8 hours PRN Nausea and/or Vomiting        Vital Signs Last 24 Hrs  T(C): 37.2 (11 Oct 2022 04:12), Max: 37.2 (11 Oct 2022 04:12)  T(F): 99 (11 Oct 2022 04:12), Max: 99 (11 Oct 2022 04:12)  HR: 74 (11 Oct 2022 04:12) (70 - 74)  BP: 124/68 (11 Oct 2022 04:12) (124/68 - 145/75)  BP(mean): --  RR: 18 (11 Oct 2022 04:12) (18 - 18)  SpO2: 93% (11 Oct 2022 04:12) (93% - 94%)    PHYSICAL EXAM:  General: No distress  Respiratory: b/l air entry  Cardiovascular: S1 S2  Gastrointestinal: soft  Extremities:  edema                       LABS:                        8.3    4.07  )-----------( 369      ( 11 Oct 2022 06:30 )             27.6           11 Oct 2022 06:30    147    |  113    |  26     ----------------------------<  118    4.3     |  29     |  0.98     Ca    8.3        11 Oct 2022 06:30    TPro  6.5    /  Alb  2.5    /  TBili  0.4    /  DBili  x      /  AST  8      /  ALT  13     /  AlkPhos  68     11 Oct 2022 06:30    PT/INR - ( 11 Oct 2022 06:30 )   PT: 41.0 sec;   INR: 3.46 ratio   Patient is a 81y old  Female who presents with a chief complaint of Pneumonia (07 Oct 2022 11:21)    Patient seen in follow up for CKD.        PAST MEDICAL HISTORY:  COPD (chronic obstructive pulmonary disease)    DM (diabetes mellitus)    Pulmonary fibrosis    PAF (paroxysmal atrial fibrillation)    Hiatal hernia    GIB (gastrointestinal bleeding)    Pericarditis    Shoulder fracture, right  MEDICATIONS  (STANDING):  aMIOdarone    Tablet 100 milliGRAM(s) Oral daily  budesonide  80 MICROgram(s)/formoterol 4.5 MICROgram(s) Inhaler 2 Puff(s) Inhalation two times a day  dextrose 5%. 1000 milliLiter(s) (50 mL/Hr) IV Continuous <Continuous>  dextrose 5%. 1000 milliLiter(s) (100 mL/Hr) IV Continuous <Continuous>  dextrose 50% Injectable 25 Gram(s) IV Push once  dextrose 50% Injectable 12.5 Gram(s) IV Push once  dextrose 50% Injectable 25 Gram(s) IV Push once  diltiazem    Tablet 30 milliGRAM(s) Oral two times a day  gabapentin 400 milliGRAM(s) Oral two times a day  glucagon  Injectable 1 milliGRAM(s) IntraMuscular once  insulin lispro (ADMELOG) corrective regimen sliding scale   SubCutaneous every 6 hours  montelukast 10 milliGRAM(s) Oral daily  pantoprazole  Injectable 40 milliGRAM(s) IV Push two times a day  simvastatin 10 milliGRAM(s) Oral at bedtime  tiotropium 18 MICROgram(s) Capsule 1 Capsule(s) Inhalation daily  traMADol 50 milliGRAM(s) Oral two times a day    MEDICATIONS  (PRN):  acetaminophen     Tablet .. 650 milliGRAM(s) Oral every 6 hours PRN Temp greater or equal to 38C (100.4F), Mild Pain (1 - 3)  dextrose Oral Gel 15 Gram(s) Oral once PRN Blood Glucose LESS THAN 70 milliGRAM(s)/deciliter  ondansetron Injectable 4 milliGRAM(s) IV Push every 8 hours PRN Nausea and/or Vomiting  Hematoma        Vital Signs Last 24 Hrs  T(C): 36.8 (10 Oct 2022 05:20), Max: 37.2 (09 Oct 2022 20:50)  T(F): 98.2 (10 Oct 2022 05:20), Max: 99 (09 Oct 2022 20:50)  HR: 76 (10 Oct 2022 05:20) (73 - 76)  BP: 130/67 (10 Oct 2022 05:20) (108/68 - 131/70)  BP(mean): --  RR: 19 (10 Oct 2022 05:20) (18 - 19)  SpO2: 96% (10 Oct 2022 05:20) (94% - 97%)    PHYSICAL EXAM:  General: + NGT  Respiratory: b/l air entry  Cardiovascular: S1 S2  Gastrointestinal: soft  Extremities:  edema                       LABS:                        8.0    7.12  )-----------( 354      ( 10 Oct 2022 07:30 )             26.0         10 Oct 2022 07:30    146    |  113    |  33     ----------------------------<  92     4.6     |  26     |  1.10     Ca    8.7        10 Oct 2022 07:30    TPro  6.6    /  Alb  2.5    /  TBili  0.4    /  DBili  x      /  AST  10     /  ALT  14     /  AlkPhos  69     10 Oct 2022 07:30    PT/INR - ( 10 Oct 2022 07:30 )   PT: 40.5 sec;   INR: 3.42 ratio

## 2022-10-10 NOTE — PROGRESS NOTE ADULT - PROBLEM SELECTOR PLAN 1
Patient c/o abdominal pain, nausea, vomiting on 10/7 2/2 small bowel obstruction  - Abdominal x-ray (10/8): suspicion for small bowel obstruction  - CT a/p: 1. Multiple dilated loops of small bowel with air-fluid levels. At least 2 sites of transition are identified in the right pelvis. Findings are consistent with moderate acute small bowel obstruction. 2. Moderate gastric distention related to the small bowel obstruction. 3. Right lower lobe pneumonia. Follow-up to resolution recommended.  - NPO  - IVF  - NGT; Monitor and record NGT output  - Small bowel series with gastrografin: High-grade small bowel obstruction  - GI (Dr. Marquis) consulted, recs appreciated  - Surgery consulted, recs reppreciated

## 2022-10-10 NOTE — PROGRESS NOTE ADULT - ASSESSMENT
SBO    NPO  IVF  NGT  surgical eval noted  conservative management for now  will follow    I reviewed the overnight course of events on the unit, re-confirming the patient history. I discussed the care with the patient and their family  Differential diagnosis and plan of care discussed with patient after the evaluation  35 minutes spent on total encounter of which more than fifty percent of the encounter was spent counseling and/or coordinating care by the attending physician.  Advanced care planning was discussed with patient and family.  Advanced care planning forms were reviewed and discussed.  Risks, benefits and alternatives of gastroenterologic procedures were discussed in detail and all questions were answered.

## 2022-10-10 NOTE — PROGRESS NOTE ADULT - SUBJECTIVE AND OBJECTIVE BOX
[facility]  Berhane Maldonado PA-C  [address] [place] [address]  [phone]      INTERVAL HPI/OVERNIGHT EVENTS:  Pt s/e  Reports +flatus  No BM  Otherwise no new GI complaints    MEDICATIONS  (STANDING):  aMIOdarone    Tablet 100 milliGRAM(s) Oral daily  budesonide  80 MICROgram(s)/formoterol 4.5 MICROgram(s) Inhaler 2 Puff(s) Inhalation two times a day  dextrose 5%. 1000 milliLiter(s) (50 mL/Hr) IV Continuous <Continuous>  dextrose 5%. 1000 milliLiter(s) (100 mL/Hr) IV Continuous <Continuous>  dextrose 50% Injectable 25 Gram(s) IV Push once  dextrose 50% Injectable 12.5 Gram(s) IV Push once  dextrose 50% Injectable 25 Gram(s) IV Push once  diltiazem    Tablet 30 milliGRAM(s) Oral two times a day  gabapentin 400 milliGRAM(s) Oral two times a day  glucagon  Injectable 1 milliGRAM(s) IntraMuscular once  insulin lispro (ADMELOG) corrective regimen sliding scale   SubCutaneous every 6 hours  metoprolol tartrate Injectable 2.5 milliGRAM(s) IV Push every 6 hours  montelukast 10 milliGRAM(s) Oral daily  pantoprazole  Injectable 40 milliGRAM(s) IV Push two times a day  simvastatin 10 milliGRAM(s) Oral at bedtime  tiotropium 18 MICROgram(s) Capsule 1 Capsule(s) Inhalation daily  traMADol 50 milliGRAM(s) Oral two times a day    MEDICATIONS  (PRN):  acetaminophen     Tablet .. 650 milliGRAM(s) Oral every 6 hours PRN Temp greater or equal to 38C (100.4F), Mild Pain (1 - 3)  dextrose Oral Gel 15 Gram(s) Oral once PRN Blood Glucose LESS THAN 70 milliGRAM(s)/deciliter  ondansetron Injectable 4 milliGRAM(s) IV Push every 8 hours PRN Nausea and/or Vomiting      Allergies    No Known Allergies    PHYSICAL EXAM:   Vital Signs:  Vital Signs Last 24 Hrs  T(C): 36.8 (10 Oct 2022 05:20), Max: 37.2 (09 Oct 2022 20:50)  T(F): 98.2 (10 Oct 2022 05:20), Max: 99 (09 Oct 2022 20:50)  HR: 76 (10 Oct 2022 05:20) (73 - 76)  BP: 130/67 (10 Oct 2022 05:20) (108/68 - 131/70)  BP(mean): --  RR: 19 (10 Oct 2022 05:20) (18 - 19)  SpO2: 96% (10 Oct 2022 05:20) (94% - 97%)    Parameters below as of 10 Oct 2022 05:20  Patient On (Oxygen Delivery Method): nasal cannula      Daily     Daily Weight in k.4 (10 Oct 2022 05:20)    GENERAL:  Appears stated age  ABDOMEN:  Soft, non-tender, non-distended  NEURO:  Alert      LABS:                        8.0    7.12  )-----------( 354      ( 10 Oct 2022 07:30 )             26.0     10-10    146<H>  |  113<H>  |  33<H>  ----------------------------<  92  4.6   |  26  |  1.10    Ca    8.7      10 Oct 2022 07:30    TPro  6.6  /  Alb  2.5<L>  /  TBili  0.4  /  DBili  x   /  AST  10<L>  /  ALT  14  /  AlkPhos  69  10-10    PT/INR - ( 10 Oct 2022 07:30 )   PT: 40.5 sec;   INR: 3.42 ratio           Urinalysis Basic - ( 08 Oct 2022 20:00 )    Color: Yellow / Appearance: Clear / S.025 / pH: x  Gluc: x / Ketone: Negative  / Bili: Negative / Urobili: Negative   Blood: x / Protein: 30 mg/dL / Nitrite: Negative   Leuk Esterase: Negative / RBC: 0-2 /HPF / WBC 0-2   Sq Epi: x / Non Sq Epi: Moderate / Bacteria: Few      I spent 120 minutes face to face with the patient. Time was spent in discussion with patient. Discussed SBO, purpose of NGT, discussed treatment plan. Visit start time: 8:00. Visit end time: 10:00.

## 2022-10-10 NOTE — PROGRESS NOTE ADULT - ASSESSMENT
81-year-old obese female past medical history previously diagnosed with DMII (no longer current), HTN, GERD, COPD not on home O2, pAfib s/p ablation currently on Coumadin, Aplastic Anemia, CKD, HFpEF admitted for pneumonia, course c/b pneumonia     HTN, PAFIB, Heart Failure   - Known A fib, tele with SR 70s  - Metoprolol recently d/c' d outpatient by Dr. Stout secondary to episodes of dizziness   - Continue home amiodarone 100 mg PO qd and Cardizem 30 mg BID   - Can use iv lopressor in place of po Cardizem and amiodarone if need to hold medicines   - on coumadin, which is to be held given SBO    - No meaningful signs of volume overload on exam   - Echo (4/2021) showed EF 57%   - Strict I/Os, daily weights.    - Pt moderate acute bowel obstruction, surgery following, plan for NGT clamp trial  - GI following     - BP stable and controlled     - Monitor and replete lytes, keep K>4, Mg>2.  - Will continue to follow.    Oliver Olvera, MS FNP, AGACNP  Nurse Practitioner- Cardiology   Spectra #[phone]

## 2022-10-10 NOTE — PROGRESS NOTE ADULT - PROBLEM SELECTOR PLAN 2
Weakness due to community acquired pnemonia.  - Confusion resolved after her fever resolved.   - Rocephin  1 g IV daily with azithromycin 500 mg IV daily  - CXR: 1. Ill-defined increased density in the right lower lobe may be due to the overlying breast shadow however follow-up is recommended to help exclude an evolving right lower lobe infiltrate in this region. 2. No other significant findings.   - CT chest: Right lower lobe consolidation compatible with pneumonia. Recommend CT chest follow-up to ensure clearing. Small right pleural effusion.  - Had IVIG last month as per patient; no urgent need while acutely infected   - Procal 0.69, f/u sputum culture   - Heme (Dr. Parker) consulted, recs appreciated: hold MIRIAM while hospitalized and will resume as an outpatient  - Pt with history of with history of Pulmonary emboli - S/P IVC filter  - ID (Dr. Carter) consulted, recs appreciated  - Dao Gallegos consulted, recs appreciated Weakness due to community acquired pnemonia.  - Confusion resolved after her fever resolved.   - Rocephin  1 g IV daily with azithromycin 500 mg IV daily  - CXR: 1. Ill-defined increased density in the right lower lobe may be due to the overlying breast shadow however follow-up is recommended to help exclude an evolving right lower lobe infiltrate in this region. 2. No other significant findings.   - CT chest: Right lower lobe consolidation compatible with pneumonia. Recommend CT chest follow-up to ensure clearing. Small right pleural effusion.  - Had IVIG last month as per patient; no urgent need while acutely infected   - Procal 0.69, f/u sputum culture   - MRSA PCR negative  - Heme (Dr. Parker) consulted, recs appreciated: hold MIRIAM while hospitalized and will resume as an outpatient  - Pt with history of with history of Pulmonary emboli - S/P IVC filter  - ID (Dr. Carter) consulted, recs appreciated  - Dao Gallegos consulted, recs appreciated

## 2022-10-10 NOTE — PROGRESS NOTE ADULT - SUBJECTIVE AND OBJECTIVE BOX
[facility]  INFECTIOUS DISEASES   [address] [place] [address]  Tel: [phone]     Fax: [phone]  ======================================================  MD Emma Archer Kaushal, MD Cho, Michelle, MD   =======================================================    MARTINEZ JONES [id number]    Follow up: pneumonia     No fever, has some cough with yellow sputum. WBC normal now.  Has SBO for which has NGT, seen by surgery. Has flatus but no BM.     Allergies:  No Known Allergies    Antibiotics:  acetaminophen     Tablet .. 650 milliGRAM(s) Oral every 6 hours PRN  aluminum hydroxide/magnesium hydroxide/simethicone Suspension 30 milliLiter(s) Oral every 4 hours PRN  aMIOdarone    Tablet 100 milliGRAM(s) Oral daily  budesonide  80 MICROgram(s)/formoterol 4.5 MICROgram(s) Inhaler 2 Puff(s) Inhalation two times a day  cefTRIAXone   IVPB 1000 milliGRAM(s) IV Intermittent every 24 hours  dextrose 5%. 1000 milliLiter(s) IV Continuous <Continuous>  dextrose 5%. 1000 milliLiter(s) IV Continuous <Continuous>  dextrose 50% Injectable 25 Gram(s) IV Push once  dextrose 50% Injectable 12.5 Gram(s) IV Push once  dextrose 50% Injectable 25 Gram(s) IV Push once  dextrose Oral Gel 15 Gram(s) Oral once PRN  dicyclomine 10 milliGRAM(s) Oral two times a day before meals PRN  diltiazem    Tablet 30 milliGRAM(s) Oral two times a day  folic acid 1 milliGRAM(s) Oral daily  gabapentin 400 milliGRAM(s) Oral two times a day  glucagon  Injectable 1 milliGRAM(s) IntraMuscular once  insulin lispro (ADMELOG) corrective regimen sliding scale   SubCutaneous three times a day before meals  insulin lispro (ADMELOG) corrective regimen sliding scale   SubCutaneous at bedtime  melatonin 3 milliGRAM(s) Oral at bedtime PRN  montelukast 10 milliGRAM(s) Oral daily  ondansetron Injectable 4 milliGRAM(s) IV Push every 8 hours PRN  pantoprazole    Tablet 40 milliGRAM(s) Oral before breakfast  simvastatin 10 milliGRAM(s) Oral at bedtime  tiotropium 18 MICROgram(s) Capsule 1 Capsule(s) Inhalation daily  traMADol 50 milliGRAM(s) Oral two times a day  warfarin 4 milliGRAM(s) Oral once       REVIEW OF SYSTEMS:  CONSTITUTIONAL:  No Fever or chills  HEENT:   No diplopia or blurred vision.  No earache, sore throat or runny nose.  CARDIOVASCULAR:  No chest pain or SOB  RESPIRATORY:  + cough, +shortness of breath, + sputum  GASTROINTESTINAL:  No nausea, vomiting or diarrhea.  GENITOURINARY:  No dysuria, frequency or urgency. No Blood in urine  MUSCULOSKELETAL:  no joint aches, no muscle pain  SKIN:  No change in skin, hair or nails.  NEUROLOGIC:  No paresthesias or weakness.  PSYCHIATRIC:  No disorder of thought or mood.  ENDOCRINE:  No heat or cold intolerance, polyuria or polydipsia.  HEMATOLOGICAL:  No easy bruising or bleeding.      Physical Exam:  Vital Signs Last 24 Hrs  T(C): 36.8 (10 Oct 2022 12:11), Max: 37.2 (09 Oct 2022 20:50)  T(F): 98.2 (10 Oct 2022 12:11), Max: 99 (09 Oct 2022 20:50)  HR: 72 (10 Oct 2022 12:11) (72 - 76)  BP: 145/75 (10 Oct 2022 12:11) (130/67 - 145/75)  BP(mean): --  RR: 18 (10 Oct 2022 12:11) (18 - 19)  SpO2: 94% (10 Oct 2022 12:11) (94% - 97%)  Parameters below as of 10 Oct 2022 12:11  Patient On (Oxygen Delivery Method): nasal cannula  O2 Flow (L/min): 2  GEN: NAD  HEENT: normocephalic and atraumatic. EOMI. DERRICK.    NECK: Supple.  No lymphadenopathy   LUNGS: mild wheezing and rhonchi   HEART: Regular rate and rhythm   ABDOMEN: Soft, nontender, and nondistended.  Positive bowel sounds.    : No CVA tenderness  EXTREMITIES: Without edema.  MSK: no joint swelling  NEUROLOGIC: grossly intact.  PSYCHIATRIC: Appropriate affect .  SKIN: No ulceration or rash       Labs:                        8.0    7.12  )-----------( 354      ( 10 Oct 2022 07:30 )             26.0     10-10    146<H>  |  113<H>  |  33<H>  ----------------------------<  92  4.6   |  26  |  1.10    Ca    8.7      10 Oct 2022 07:30    TPro  6.6  /  Alb  2.5<L>  /  TBili  0.4  /  DBili  x   /  AST  10<L>  /  ALT  14  /  AlkPhos  69  10-10    Culture - Sputum (collected 10-07-22 @ 16:00)  Source: .Sputum Sputum  Gram Stain (10-08-22 @ 06:22):    Few polymorphonuclear leukocytes per low power field    No Squamous epithelial cells per low power field    Rare Gram positive cocci in pairs per oil power field    Rare Gram Negative Coccobacilli seen per oil power field  Final Report (10-09-22 @ 16:48):    Normal Respiratory Marilee present    Culture - Blood (collected 10-06-22 @ 22:10)  Source: .Blood Blood-Peripheral    Culture - Blood (collected 10-06-22 @ 22:00)  Source: .Blood Blood-Peripheral    Culture - Urine (collected 10-05-22 @ 15:30)  Source: Clean Catch Clean Catch (Midstream)  Final Report (10-06-22 @ 18:54):    <10,000 CFU/mL Normal Urogenital Marilee    Culture - Blood (collected 10-05-22 @ 13:40)  Source: .Blood Blood-Peripheral    Culture - Blood (collected 10-05-22 @ 13:25)  Source: .Blood Blood-Peripheral    WBC Count: 7.12 K/uL (10-10-22 @ 07:30)  WBC Count: 12.50 K/uL (10-09-22 @ 06:38)  WBC Count: 15.25 K/uL (10-08-22 @ 07:52)  WBC Count: 16.92 K/uL (10-07-22 @ 06:50)  WBC Count: 15.81 K/uL (10-06-22 @ 07:30)    Creatinine, Serum: 1.10 mg/dL (10-10-22 @ 07:30)  Creatinine, Serum: 1.30 mg/dL (10-09-22 @ 06:38)  Creatinine, Serum: 1.60 mg/dL (10-08-22 @ 07:52)  Creatinine, Serum: 1.60 mg/dL (10-07-22 @ 06:50)  Creatinine, Serum: 1.40 mg/dL (10-06-22 @ 07:30)    Procalcitonin, Serum: 0.69 ng/mL (10-08-22 @ 07:52)  Procalcitonin, Serum: 1.04 ng/mL (10-06-22 @ 07:30)     COVID-19 PCR: NotDetec (10-05-22 @ 13:40)  SARS-CoV-2: NotDetec (10-05-22 @ 13:40)    All imaging and data are reviewed.   < from: CT Chest No Cont (10.06.22 @ 09:04) >  IMPRESSION:  Right lower lobe consolidation compatible with pneumonia. Recommend CT   chest follow-up to ensure clearing.  Small right pleural effusion.    Assessment and Plan:   81-year-old  female with past medical history of chronic bronchitis, pulmonary embolism s/p IVC filter, paroxysmal A. fib with chronic Coumadin, IgG deficiency, CKD, dyslipidemia presented with confusion and generalized weakness being admitted for acute metabolic encephalopathy, acute respiratory distress/Failure with right lower lobe community-acquired pneumonia (with history of pneumonia/parainfluenza in April 2022)  Had IvIg last month as per patient; seen by Dr. Parker.     No fever today, WBC; 7k, blood cultures negative to date. SBO with NG tube now, managed by surgery.     Recommendations:   - Sputum culture with normal respiratory marilee   - Follow up legionella U ag  - MRSA PCR negative   - Continue ceftriaxone 1gm daily  - Continue azithromycin 500mg daily   - Can treat for total 5days.   - SBO treatment as per surgery     Will follow     Sea Donnelly MD  Division of Infectious Diseases   Please call ID service at [phone] with any question.      35 minutes spent on total encounter assessing patient, examination, chart review, counseling and coordinating care by the attending physician/nurse/care manager.

## 2022-10-10 NOTE — PROGRESS NOTE ADULT - SUBJECTIVE AND OBJECTIVE BOX
SURGERY PA NOTE ON BEHALF OF  ___ / ___ SURGERY:    S: Patient seen and examined at bedside.       MEDICATIONS:  acetaminophen     Tablet .. 650 milliGRAM(s) Oral every 6 hours PRN  aMIOdarone    Tablet 100 milliGRAM(s) Oral daily  budesonide  80 MICROgram(s)/formoterol 4.5 MICROgram(s) Inhaler 2 Puff(s) Inhalation two times a day  dextrose 5%. 1000 milliLiter(s) IV Continuous <Continuous>  dextrose 5%. 1000 milliLiter(s) IV Continuous <Continuous>  dextrose 50% Injectable 25 Gram(s) IV Push once  dextrose 50% Injectable 12.5 Gram(s) IV Push once  dextrose 50% Injectable 25 Gram(s) IV Push once  dextrose Oral Gel 15 Gram(s) Oral once PRN  diltiazem    Tablet 30 milliGRAM(s) Oral two times a day  gabapentin 400 milliGRAM(s) Oral two times a day  glucagon  Injectable 1 milliGRAM(s) IntraMuscular once  insulin lispro (ADMELOG) corrective regimen sliding scale   SubCutaneous every 6 hours  montelukast 10 milliGRAM(s) Oral daily  ondansetron Injectable 4 milliGRAM(s) IV Push every 8 hours PRN  pantoprazole  Injectable 40 milliGRAM(s) IV Push two times a day  simvastatin 10 milliGRAM(s) Oral at bedtime  sodium chloride 0.9%. 1000 milliLiter(s) IV Continuous <Continuous>  tiotropium 18 MICROgram(s) Capsule 1 Capsule(s) Inhalation daily  traMADol 50 milliGRAM(s) Oral two times a day      O:  Vital Signs Last 24 Hrs  T(C): 36.8 (10 Oct 2022 05:20), Max: 37.2 (09 Oct 2022 20:50)  T(F): 98.2 (10 Oct 2022 05:20), Max: 99 (09 Oct 2022 20:50)  HR: 76 (10 Oct 2022 05:20) (73 - 76)  BP: 130/67 (10 Oct 2022 05:20) (108/68 - 131/70)  BP(mean): --  RR: 19 (10 Oct 2022 05:20) (18 - 19)  SpO2: 96% (10 Oct 2022 05:20) (94% - 97%)    Parameters below as of 10 Oct 2022 05:20  Patient On (Oxygen Delivery Method): nasal cannula        I&O SUMMARY:    10-09-22 @ 07:01  -  10-10-22 @ 07:00  --------------------------------------------------------  IN: 975 mL / OUT: 1300 mL / NET: -325 mL        PHYSICAL EXAM:  Lungs: CTA bilat without W/R/R  Card: S1S2  Abd: Soft, NT, ND.  +BS x 4.  No rebound/guarding.    Ext: Calves soft, NT, without edema bilat    LABS:                        8.7    12.50 )-----------( 343      ( 09 Oct 2022 06:38 )             27.2     10-09    142  |  107  |  34<H>  ----------------------------<  94  4.6   |  28  |  1.30    Ca    8.7      09 Oct 2022 06:38    TPro  6.7  /  Alb  2.5<L>  /  TBili  0.5  /  DBili  x   /  AST  10<L>  /  ALT  16  /  AlkPhos  70  10-09    PT/INR - ( 09 Oct 2022 12:34 )   PT: 35.8 sec;   INR: 3.03 ratio                   RADIOLOGY:    A:    P:       STEVOEON, 200cc bilious op from [facility]     Hospital Day #2:  Dx: PNA, SBO    SUBJECTIVE:  Patient is doing well this morning, seen and examined at bedside, denies any new complaints. Denies any nausea, vomiting, chest pain, shortness of breath, calf tenderness, fever or chills. Reports +flatus but no bowel movement yet. NGT in place     MEDICATIONS  (STANDING):  aMIOdarone    Tablet 100 milliGRAM(s) Oral daily  budesonide  80 MICROgram(s)/formoterol 4.5 MICROgram(s) Inhaler 2 Puff(s) Inhalation two times a day  dextrose 5% + sodium chloride 0.45%. 1000 milliLiter(s) (75 mL/Hr) IV Continuous <Continuous>  dextrose 5%. 1000 milliLiter(s) (50 mL/Hr) IV Continuous <Continuous>  dextrose 5%. 1000 milliLiter(s) (100 mL/Hr) IV Continuous <Continuous>  dextrose 50% Injectable 25 Gram(s) IV Push once  dextrose 50% Injectable 12.5 Gram(s) IV Push once  dextrose 50% Injectable 25 Gram(s) IV Push once  diltiazem    Tablet 30 milliGRAM(s) Oral two times a day  gabapentin 400 milliGRAM(s) Oral two times a day  glucagon  Injectable 1 milliGRAM(s) IntraMuscular once  insulin lispro (ADMELOG) corrective regimen sliding scale   SubCutaneous every 6 hours  montelukast 10 milliGRAM(s) Oral daily  pantoprazole  Injectable 40 milliGRAM(s) IV Push two times a day  simvastatin 10 milliGRAM(s) Oral at bedtime  tiotropium 18 MICROgram(s) Capsule 1 Capsule(s) Inhalation daily  traMADol 50 milliGRAM(s) Oral two times a day    MEDICATIONS  (PRN):  acetaminophen     Tablet .. 650 milliGRAM(s) Oral every 6 hours PRN Temp greater or equal to 38C (100.4F), Mild Pain (1 - 3)  dextrose Oral Gel 15 Gram(s) Oral once PRN Blood Glucose LESS THAN 70 milliGRAM(s)/deciliter  ondansetron Injectable 4 milliGRAM(s) IV Push every 8 hours PRN Nausea and/or Vomiting      Vital Signs Last 24 Hrs  T(C): 36.8 (10 Oct 2022 05:20), Max: 37.2 (09 Oct 2022 20:50)  T(F): 98.2 (10 Oct 2022 05:20), Max: 99 (09 Oct 2022 20:50)  HR: 76 (10 Oct 2022 05:20) (73 - 76)  BP: 130/67 (10 Oct 2022 05:20) (108/68 - 131/70)  BP(mean): --  RR: 19 (10 Oct 2022 05:20) (18 - 19)  SpO2: 96% (10 Oct 2022 05:20) (94% - 97%)    Parameters below as of 10 Oct 2022 05:20  Patient On (Oxygen Delivery Method): nasal cannula        PHYSICAL EXAM:  Constitutional: AAOx3, no acute distress  HEENT: NCAT, airway patent  Cardiovascular: RRR, pulses present bilaterally  Respiratory: nonlabored breathing  Gastrointestinal: abdomen soft, nontender, non distended, no rebound or guarding  Neuro: no focal deficits     I&O's Detail    09 Oct 2022 07:01  -  10 Oct 2022 07:00  --------------------------------------------------------  IN:    sodium chloride 0.9%: 975 mL  Total IN: 975 mL    OUT:    Nasogastric/Oral tube (mL): 1100 mL    Voided (mL): 200 mL  Total OUT: 1300 mL    Total NET: -325 mL          LABS:                        8.0    7.12  )-----------( 354      ( 10 Oct 2022 07:30 )             26.0     10-10    146<H>  |  113<H>  |  33<H>  ----------------------------<  92  4.6   |  26  |  1.10    Ca    8.7      10 Oct 2022 07:30    TPro  6.6  /  Alb  2.5<L>  /  TBili  0.4  /  DBili  x   /  AST  10<L>  /  ALT  14  /  AlkPhos  69  10-10    PT/INR - ( 10 Oct 2022 07:30 )   PT: 40.5 sec;   INR: 3.42 ratio           Urinalysis Basic - ( 08 Oct 2022 20:00 )    Color: Yellow / Appearance: Clear / S.025 / pH: x  Gluc: x / Ketone: Negative  / Bili: Negative / Urobili: Negative   Blood: x / Protein: 30 mg/dL / Nitrite: Negative   Leuk Esterase: Negative / RBC: 0-2 /HPF / WBC 0-2   Sq Epi: x / Non Sq Epi: Moderate / Bacteria: Few        RADIOLOGY & ADDITIONAL STUDIES:

## 2022-10-10 NOTE — PROGRESS NOTE ADULT - ASSESSMENT
Prerenal azotemia, CKD 3a  Pneumonia  Diabetes  Hypertension  h/o CHF  Anemia      Improved and stable renal indices. Continue IV hydration. To  continue current meds. Monitor blood sugar levels. Insulin coverage as needed. Dietary restriction.   Monitor BP trend. Titrate BP meds as needed. Hematology follow up. Will follow electrolytes and renal function trend. Prerenal azotemia, CKD 3a  Pneumonia  Diabetes  Hypertension  h/o CHF  Anemia      Improved and stable renal indices. Continue IV hydration. To  continue current meds. Monitor blood sugar levels. Insulin coverage as needed. Dietary restriction.   Monitor BP trend. Titrate BP meds as needed. Hematology follow up. Will follow electrolytes and renal function trend. Surgery follow up.

## 2022-10-10 NOTE — PROGRESS NOTE ADULT - ASSESSMENT
IMPRESSION    Patient with anemia multifactorial in anemia secondary to renal insufficiency, myelodysplasia now complicated by  admission with pneumonia  patient is MIRIAM dependent and does require occasional transfusion  s/p 1 unit PRBC with improvement in hgb 9.1  developed abd pain NV last night and now being treated for SBO    RECOMMEND:  1.  follow CBC  2.  to hold MIRIAM while hospitalized and will resume as an outpatient  3.  continue present abx  4.  continue surgical management  5.  hold transfusion and follow CBC  6.  further hematologic recommendations pending above  discussed with patient  IMPRESSION  Patient with anemia multifactorial in anemia secondary to renal insufficiency, myelodysplasia now complicated by  admission with pneumonia  patient is MIRIAM dependent and does require occasional transfusion  s/p 1 unit PRBC with improvement in hgb 9.1  developed abd pain NV last night and now being treated for SBO  s/p NGT for decompression    Elevated ferritin with likely iron overload    RECOMMEND:  1.  follow CBC  2.  to hold MIRIAM while hospitalized and will resume as an outpatient  3.  continue present abx  4.  continue surgical management  5.  s/p PRBC transfusion at admission. May need transfusion and follow CBC, but would prefer conservative strategy with regards to transfusion  6.  further hematologic recommendations pending clinical coourse  discussed with patient

## 2022-10-10 NOTE — PROGRESS NOTE ADULT - SUBJECTIVE AND OBJECTIVE BOX
Patient is a 81y old  Female who presents with a chief complaint of Pneumonia (10 Oct 2022 07:52)      INTERVAL HPI/OVERNIGHT EVENTS: Patient seen and examined at bedside. No overnight events. NGT in place. Denies bowel movement. Passing flatus. Denies fever, chills, chest pain, shortness of breath, abdominal pain, nausea/vomiting, headache.    MEDICATIONS  (STANDING):  aMIOdarone    Tablet 100 milliGRAM(s) Oral daily  budesonide  80 MICROgram(s)/formoterol 4.5 MICROgram(s) Inhaler 2 Puff(s) Inhalation two times a day  dextrose 5%. 1000 milliLiter(s) (50 mL/Hr) IV Continuous <Continuous>  dextrose 5%. 1000 milliLiter(s) (100 mL/Hr) IV Continuous <Continuous>  dextrose 50% Injectable 25 Gram(s) IV Push once  dextrose 50% Injectable 12.5 Gram(s) IV Push once  dextrose 50% Injectable 25 Gram(s) IV Push once  diltiazem    Tablet 30 milliGRAM(s) Oral two times a day  gabapentin 400 milliGRAM(s) Oral two times a day  glucagon  Injectable 1 milliGRAM(s) IntraMuscular once  insulin lispro (ADMELOG) corrective regimen sliding scale   SubCutaneous every 6 hours  montelukast 10 milliGRAM(s) Oral daily  pantoprazole  Injectable 40 milliGRAM(s) IV Push two times a day  simvastatin 10 milliGRAM(s) Oral at bedtime  tiotropium 18 MICROgram(s) Capsule 1 Capsule(s) Inhalation daily  traMADol 50 milliGRAM(s) Oral two times a day    MEDICATIONS  (PRN):  acetaminophen     Tablet .. 650 milliGRAM(s) Oral every 6 hours PRN Temp greater or equal to 38C (100.4F), Mild Pain (1 - 3)  dextrose Oral Gel 15 Gram(s) Oral once PRN Blood Glucose LESS THAN 70 milliGRAM(s)/deciliter  ondansetron Injectable 4 milliGRAM(s) IV Push every 8 hours PRN Nausea and/or Vomiting      Allergies    No Known Allergies    Intolerances        REVIEW OF SYSTEMS:  CONSTITUTIONAL: No fever or chills  HEENT:  No headache, no sore throat  RESPIRATORY: No cough, wheezing, or shortness of breath  CARDIOVASCULAR: No chest pain, palpitations  GASTROINTESTINAL: No abd pain, nausea, vomiting, or diarrhea  GENITOURINARY: No dysuria, frequency, or hematuria  NEUROLOGICAL: no focal weakness or dizziness  MUSCULOSKELETAL: no myalgias     Vital Signs Last 24 Hrs  T(C): 36.8 (10 Oct 2022 05:20), Max: 37.2 (09 Oct 2022 20:50)  T(F): 98.2 (10 Oct 2022 05:20), Max: 99 (09 Oct 2022 20:50)  HR: 76 (10 Oct 2022 05:20) (73 - 76)  BP: 130/67 (10 Oct 2022 05:20) (108/68 - 131/70)  BP(mean): --  RR: 19 (10 Oct 2022 05:20) (18 - 19)  SpO2: 96% (10 Oct 2022 05:20) (94% - 97%)    Parameters below as of 10 Oct 2022 05:20  Patient On (Oxygen Delivery Method): nasal cannula        PHYSICAL EXAM:  GENERAL: NAD, NGT in place  HEENT:  anicteric, moist mucous membranes  CHEST/LUNG:  CTA b/l, no rales, wheezes, or rhonchi  HEART:  RRR, S1, S2  ABDOMEN:  BS+, soft, nontender, nondistended  EXTREMITIES: no edema, cyanosis, or calf tenderness  NERVOUS SYSTEM: answers questions and follows commands appropriately    LABS:                        8.0    7.12  )-----------( 354      ( 10 Oct 2022 07:30 )             26.0     CBC Full  -  ( 10 Oct 2022 07:30 )  WBC Count : 7.12 K/uL  Hemoglobin : 8.0 g/dL  Hematocrit : 26.0 %  Platelet Count - Automated : 354 K/uL  Mean Cell Volume : 101.6 fl  Mean Cell Hemoglobin : 31.3 pg  Mean Cell Hemoglobin Concentration : 30.8 gm/dL  Auto Neutrophil # : x  Auto Lymphocyte # : x  Auto Monocyte # : x  Auto Eosinophil # : x  Auto Basophil # : x  Auto Neutrophil % : x  Auto Lymphocyte % : x  Auto Monocyte % : x  Auto Eosinophil % : x  Auto Basophil % : x    10 Oct 2022 07:30    146    |  113    |  33     ----------------------------<  92     4.6     |  26     |  1.10     Ca    8.7        10 Oct 2022 07:30    TPro  6.6    /  Alb  2.5    /  TBili  0.4    /  DBili  x      /  AST  10     /  ALT  14     /  AlkPhos  69     10 Oct 2022 07:30    PT/INR - ( 10 Oct 2022 07:30 )   PT: 40.5 sec;   INR: 3.42 ratio           Urinalysis Basic - ( 08 Oct 2022 20:00 )    Color: Yellow / Appearance: Clear / S.025 / pH: x  Gluc: x / Ketone: Negative  / Bili: Negative / Urobili: Negative   Blood: x / Protein: 30 mg/dL / Nitrite: Negative   Leuk Esterase: Negative / RBC: 0-2 /HPF / WBC 0-2   Sq Epi: x / Non Sq Epi: Moderate / Bacteria: Few      CAPILLARY BLOOD GLUCOSE      POCT Blood Glucose.: 91 mg/dL (10 Oct 2022 06:05)  POCT Blood Glucose.: 89 mg/dL (09 Oct 2022 23:40)  POCT Blood Glucose.: 91 mg/dL (09 Oct 2022 18:16)  POCT Blood Glucose.: 94 mg/dL (09 Oct 2022 11:25)        Culture - Sputum (collected 10-07-22 @ 16:00)  Source: .Sputum Sputum  Gram Stain (10-08-22 @ 06:22):    Few polymorphonuclear leukocytes per low power field    No Squamous epithelial cells per low power field    Rare Gram positive cocci in pairs per oil power field    Rare Gram Negative Coccobacilli seen per oil power field  Final Report (10-09-22 @ 16:48):    Normal Respiratory Ginger present    Culture - Blood (collected 10-06-22 @ 22:10)  Source: .Blood Blood-Peripheral  Preliminary Report (10-08-22 @ 04:01):    No growth to date.    Culture - Blood (collected 10-06-22 @ 22:00)  Source: .Blood Blood-Peripheral  Preliminary Report (10-08-22 @ 04:01):    No growth to date.    Culture - Urine (collected 10-05-22 @ 15:30)  Source: Clean Catch Clean Catch (Midstream)  Final Report (10-06-22 @ 18:54):    <10,000 CFU/mL Normal Urogenital Ginger    Culture - Blood (collected 10-05-22 @ 13:40)  Source: .Blood Blood-Peripheral  Preliminary Report (10-06-22 @ 19:01):    No growth to date.    Culture - Blood (collected 10-05-22 @ 13:25)  Source: .Blood Blood-Peripheral  Preliminary Report (10-06-22 @ 19:01):    No growth to date.        RADIOLOGY & ADDITIONAL TESTS:    Personally reviewed.     Consultant(s) Notes Reviewed:  [x] YES  [ ] NO

## 2022-10-10 NOTE — PROGRESS NOTE ADULT - PROBLEM SELECTOR PLAN 4
Pt with CKD (G3a), baseline GFR ~42, Cr 1.2-1.3 at baseline per chart review  - Cr 1.1 today  - Monitor BMP  - Avoid nephrotoxic medications  - Monitor renal indices

## 2022-10-10 NOTE — PROGRESS NOTE ADULT - PROBLEM SELECTOR PLAN 6
Pt with history of pAfib s/p ablation on Coumadin   - Daily INR checks  - Hold Coumadin in the setting of SBO  - Home regimen: Coumadin 4mg Tuesday/Wednesday/Thursday/Sunday + Coumadin 6mg Monday/Friday/Saturday  - Hold Cardizem and Amiodarone 100mg qd while NPO, may use Lopressor

## 2022-10-10 NOTE — PROGRESS NOTE ADULT - ASSESSMENT
80 yo F w extensive PMHx a/w PNA now with CT findings c/w SBO    Patient passing flatus   NPO/NGT/IVF  Consider clamp trial today  F/u KUB  Continue care per primary team    80 yo F w extensive PMHx a/w PNA now with CT findings c/w SBO    Patient passing flatus   NPO/NGT/IVF  Consider clamp trial today  F/u KUB  Continue care per primary team   Surg. Att.  Pt continues to pass flatus, no BM yet.   Abd. exam is benign.  Will f/u AXR. NGT output is coming down.

## 2022-10-11 ENCOUNTER — TRANSCRIPTION ENCOUNTER (OUTPATIENT)
Age: 82
End: 2022-10-11

## 2022-10-11 LAB
ALBUMIN SERPL ELPH-MCNC: 2.5 G/DL — LOW (ref 3.3–5)
ALP SERPL-CCNC: 68 U/L — SIGNIFICANT CHANGE UP (ref 40–120)
ALT FLD-CCNC: 13 U/L — SIGNIFICANT CHANGE UP (ref 12–78)
ANION GAP SERPL CALC-SCNC: 5 MMOL/L — SIGNIFICANT CHANGE UP (ref 5–17)
AST SERPL-CCNC: 8 U/L — LOW (ref 15–37)
BASOPHILS # BLD AUTO: 0.01 K/UL — SIGNIFICANT CHANGE UP (ref 0–0.2)
BASOPHILS NFR BLD AUTO: 0.2 % — SIGNIFICANT CHANGE UP (ref 0–2)
BILIRUB SERPL-MCNC: 0.4 MG/DL — SIGNIFICANT CHANGE UP (ref 0.2–1.2)
BUN SERPL-MCNC: 26 MG/DL — HIGH (ref 7–23)
CALCIUM SERPL-MCNC: 8.3 MG/DL — LOW (ref 8.5–10.1)
CHLORIDE SERPL-SCNC: 113 MMOL/L — HIGH (ref 96–108)
CO2 SERPL-SCNC: 29 MMOL/L — SIGNIFICANT CHANGE UP (ref 22–31)
CREAT SERPL-MCNC: 0.98 MG/DL — SIGNIFICANT CHANGE UP (ref 0.5–1.3)
EGFR: 59 ML/MIN/1.73M2 — LOW
EOSINOPHIL # BLD AUTO: 0.02 K/UL — SIGNIFICANT CHANGE UP (ref 0–0.5)
EOSINOPHIL NFR BLD AUTO: 0.5 % — SIGNIFICANT CHANGE UP (ref 0–6)
GLUCOSE SERPL-MCNC: 118 MG/DL — HIGH (ref 70–99)
HCT VFR BLD CALC: 27.6 % — LOW (ref 34.5–45)
HGB BLD-MCNC: 8.3 G/DL — LOW (ref 11.5–15.5)
IMM GRANULOCYTES NFR BLD AUTO: 4.9 % — HIGH (ref 0–0.9)
INR BLD: 3.46 RATIO — HIGH (ref 0.88–1.16)
LYMPHOCYTES # BLD AUTO: 0.95 K/UL — LOW (ref 1–3.3)
LYMPHOCYTES # BLD AUTO: 23.3 % — SIGNIFICANT CHANGE UP (ref 13–44)
MCHC RBC-ENTMCNC: 30.1 GM/DL — LOW (ref 32–36)
MCHC RBC-ENTMCNC: 31.3 PG — SIGNIFICANT CHANGE UP (ref 27–34)
MCV RBC AUTO: 104.2 FL — HIGH (ref 80–100)
MONOCYTES # BLD AUTO: 0.54 K/UL — SIGNIFICANT CHANGE UP (ref 0–0.9)
MONOCYTES NFR BLD AUTO: 13.3 % — SIGNIFICANT CHANGE UP (ref 2–14)
NEUTROPHILS # BLD AUTO: 2.35 K/UL — SIGNIFICANT CHANGE UP (ref 1.8–7.4)
NEUTROPHILS NFR BLD AUTO: 57.8 % — SIGNIFICANT CHANGE UP (ref 43–77)
NRBC # BLD: 0 /100 WBCS — SIGNIFICANT CHANGE UP (ref 0–0)
PLATELET # BLD AUTO: 369 K/UL — SIGNIFICANT CHANGE UP (ref 150–400)
POTASSIUM SERPL-MCNC: 4.3 MMOL/L — SIGNIFICANT CHANGE UP (ref 3.5–5.3)
POTASSIUM SERPL-SCNC: 4.3 MMOL/L — SIGNIFICANT CHANGE UP (ref 3.5–5.3)
PROT SERPL-MCNC: 6.5 G/DL — SIGNIFICANT CHANGE UP (ref 6–8.3)
PROTHROM AB SERPL-ACNC: 41 SEC — HIGH (ref 10.5–13.4)
RBC # BLD: 2.65 M/UL — LOW (ref 3.8–5.2)
RBC # FLD: 23.9 % — HIGH (ref 10.3–14.5)
SODIUM SERPL-SCNC: 147 MMOL/L — HIGH (ref 135–145)
WBC # BLD: 4.07 K/UL — SIGNIFICANT CHANGE UP (ref 3.8–10.5)
WBC # FLD AUTO: 4.07 K/UL — SIGNIFICANT CHANGE UP (ref 3.8–10.5)

## 2022-10-11 PROCEDURE — 99232 SBSQ HOSP IP/OBS MODERATE 35: CPT

## 2022-10-11 PROCEDURE — 99233 SBSQ HOSP IP/OBS HIGH 50: CPT

## 2022-10-11 PROCEDURE — 99231 SBSQ HOSP IP/OBS SF/LOW 25: CPT

## 2022-10-11 RX ORDER — INSULIN LISPRO 100/ML
VIAL (ML) SUBCUTANEOUS AT BEDTIME
Refills: 0 | Status: DISCONTINUED | OUTPATIENT
Start: 2022-10-11 | End: 2022-10-15

## 2022-10-11 RX ORDER — INSULIN LISPRO 100/ML
VIAL (ML) SUBCUTANEOUS
Refills: 0 | Status: DISCONTINUED | OUTPATIENT
Start: 2022-10-11 | End: 2022-10-15

## 2022-10-11 RX ORDER — SODIUM CHLORIDE 9 MG/ML
1000 INJECTION, SOLUTION INTRAVENOUS
Refills: 0 | Status: DISCONTINUED | OUTPATIENT
Start: 2022-10-11 | End: 2022-10-11

## 2022-10-11 RX ORDER — SODIUM CHLORIDE 9 MG/ML
1000 INJECTION, SOLUTION INTRAVENOUS
Refills: 0 | Status: DISCONTINUED | OUTPATIENT
Start: 2022-10-11 | End: 2022-10-12

## 2022-10-11 RX ADMIN — Medication 30 MILLIGRAM(S): at 18:44

## 2022-10-11 RX ADMIN — SIMVASTATIN 10 MILLIGRAM(S): 20 TABLET, FILM COATED ORAL at 22:58

## 2022-10-11 RX ADMIN — BUDESONIDE AND FORMOTEROL FUMARATE DIHYDRATE 2 PUFF(S): 160; 4.5 AEROSOL RESPIRATORY (INHALATION) at 05:23

## 2022-10-11 RX ADMIN — Medication 200 MILLIGRAM(S): at 16:34

## 2022-10-11 RX ADMIN — GABAPENTIN 400 MILLIGRAM(S): 400 CAPSULE ORAL at 18:44

## 2022-10-11 RX ADMIN — TIOTROPIUM BROMIDE 1 CAPSULE(S): 18 CAPSULE ORAL; RESPIRATORY (INHALATION) at 05:23

## 2022-10-11 RX ADMIN — TRAMADOL HYDROCHLORIDE 50 MILLIGRAM(S): 50 TABLET ORAL at 18:44

## 2022-10-11 RX ADMIN — BUDESONIDE AND FORMOTEROL FUMARATE DIHYDRATE 2 PUFF(S): 160; 4.5 AEROSOL RESPIRATORY (INHALATION) at 18:43

## 2022-10-11 RX ADMIN — PANTOPRAZOLE SODIUM 40 MILLIGRAM(S): 20 TABLET, DELAYED RELEASE ORAL at 18:44

## 2022-10-11 RX ADMIN — PANTOPRAZOLE SODIUM 40 MILLIGRAM(S): 20 TABLET, DELAYED RELEASE ORAL at 05:23

## 2022-10-11 RX ADMIN — MONTELUKAST 10 MILLIGRAM(S): 4 TABLET, CHEWABLE ORAL at 12:02

## 2022-10-11 NOTE — DIETITIAN INITIAL EVALUATION ADULT - PERTINENT MEDS FT
MEDICATIONS  (STANDING):  aMIOdarone    Tablet 100 milliGRAM(s) Oral daily  budesonide  80 MICROgram(s)/formoterol 4.5 MICROgram(s) Inhaler 2 Puff(s) Inhalation two times a day  dextrose 5%. 1000 milliLiter(s) (75 mL/Hr) IV Continuous <Continuous>  dextrose 5%. 1000 milliLiter(s) (50 mL/Hr) IV Continuous <Continuous>  dextrose 5%. 1000 milliLiter(s) (100 mL/Hr) IV Continuous <Continuous>  dextrose 50% Injectable 25 Gram(s) IV Push once  dextrose 50% Injectable 25 Gram(s) IV Push once  dextrose 50% Injectable 12.5 Gram(s) IV Push once  diltiazem    Tablet 30 milliGRAM(s) Oral two times a day  gabapentin 400 milliGRAM(s) Oral two times a day  glucagon  Injectable 1 milliGRAM(s) IntraMuscular once  insulin lispro (ADMELOG) corrective regimen sliding scale   SubCutaneous three times a day before meals  insulin lispro (ADMELOG) corrective regimen sliding scale   SubCutaneous at bedtime  montelukast 10 milliGRAM(s) Oral daily  pantoprazole  Injectable 40 milliGRAM(s) IV Push two times a day  simvastatin 10 milliGRAM(s) Oral at bedtime  tiotropium 18 MICROgram(s) Capsule 1 Capsule(s) Inhalation daily  traMADol 50 milliGRAM(s) Oral two times a day    MEDICATIONS  (PRN):  acetaminophen     Tablet .. 650 milliGRAM(s) Oral every 6 hours PRN Temp greater or equal to 38C (100.4F), Mild Pain (1 - 3)  dextrose Oral Gel 15 Gram(s) Oral once PRN Blood Glucose LESS THAN 70 milliGRAM(s)/deciliter  ondansetron Injectable 4 milliGRAM(s) IV Push every 8 hours PRN Nausea and/or Vomiting

## 2022-10-11 NOTE — DISCHARGE NOTE PROVIDER - NSDCCPCAREPLAN_GEN_ALL_CORE_FT
PRINCIPAL DISCHARGE DIAGNOSIS  Diagnosis: Pneumonia  Assessment and Plan of Treatment: Pneumonia is an infection of the lungs. You were treated with IV antibiotics and improved. Do not use tobacco products, including cigarettes, chewing tobacco, and e-cigarettes.  SEEK IMMEDIATE MEDICAL CARE IF YOU HAVE ANY OF THE FOLLOWING SYMPTOMS: worsening shortness of breath, worsening chest pain, coughing up blood, change in mental status, lightheadedness/dizziness.      SECONDARY DISCHARGE DIAGNOSES  Diagnosis: Small bowel obstruction  Assessment and Plan of Treatment: You developed a small bowel obstruction during your stay. Surgery and GI followed you. You received IV fluids. Your diet was advanced as tolerated. Nasogastric tube was placed and the obstruction resolved. Please follow up with your PCP within 1 week of discharge.    Diagnosis: Paroxysmal atrial fibrillation  Assessment and Plan of Treatment: You have a known diagnosis of atrial fibrillation prior to your admission. This condition, if not treated, increases your risk of stroke or heart attack. Please continue to take Coumadin, Cardizem and Amiodarone as prescribed. Please make sure to continue taking these medications to avoid developing blood clots and to lower your risk of stroke. Additionally, please follow up with your PCP (and/or cardiologist) on a regular basis to ensure that this condition does not require changes to the dose or type of medication.

## 2022-10-11 NOTE — PROGRESS NOTE ADULT - SUBJECTIVE AND OBJECTIVE BOX
[facility]  INFECTIOUS DISEASES   [address] [place] [address]  Tel: [phone]     Fax: [phone]  ======================================================  MD Emma Archer Kaushal, MD Cho, Michelle, MD   =======================================================    MARTINEZ JONES [id number]    Follow up: pneumonia     No fever, cough improving. WBC normal now.  Has SBO for which had  NGT, seen by surgery. Has flatus but no BM.     Allergies:  No Known Allergies    Antibiotics:  acetaminophen     Tablet .. 650 milliGRAM(s) Oral every 6 hours PRN  aluminum hydroxide/magnesium hydroxide/simethicone Suspension 30 milliLiter(s) Oral every 4 hours PRN  aMIOdarone    Tablet 100 milliGRAM(s) Oral daily  budesonide  80 MICROgram(s)/formoterol 4.5 MICROgram(s) Inhaler 2 Puff(s) Inhalation two times a day  cefTRIAXone   IVPB 1000 milliGRAM(s) IV Intermittent every 24 hours  dextrose 5%. 1000 milliLiter(s) IV Continuous <Continuous>  dextrose 5%. 1000 milliLiter(s) IV Continuous <Continuous>  dextrose 50% Injectable 25 Gram(s) IV Push once  dextrose 50% Injectable 12.5 Gram(s) IV Push once  dextrose 50% Injectable 25 Gram(s) IV Push once  dextrose Oral Gel 15 Gram(s) Oral once PRN  dicyclomine 10 milliGRAM(s) Oral two times a day before meals PRN  diltiazem    Tablet 30 milliGRAM(s) Oral two times a day  folic acid 1 milliGRAM(s) Oral daily  gabapentin 400 milliGRAM(s) Oral two times a day  glucagon  Injectable 1 milliGRAM(s) IntraMuscular once  insulin lispro (ADMELOG) corrective regimen sliding scale   SubCutaneous three times a day before meals  insulin lispro (ADMELOG) corrective regimen sliding scale   SubCutaneous at bedtime  melatonin 3 milliGRAM(s) Oral at bedtime PRN  montelukast 10 milliGRAM(s) Oral daily  ondansetron Injectable 4 milliGRAM(s) IV Push every 8 hours PRN  pantoprazole    Tablet 40 milliGRAM(s) Oral before breakfast  simvastatin 10 milliGRAM(s) Oral at bedtime  tiotropium 18 MICROgram(s) Capsule 1 Capsule(s) Inhalation daily  traMADol 50 milliGRAM(s) Oral two times a day  warfarin 4 milliGRAM(s) Oral once       REVIEW OF SYSTEMS:  CONSTITUTIONAL:  No Fever or chills  HEENT:   No diplopia or blurred vision.  No earache, sore throat or runny nose.  CARDIOVASCULAR:  No chest pain or SOB  RESPIRATORY:  + cough, +shortness of breath, + sputum  GASTROINTESTINAL:  No nausea, vomiting or diarrhea.  GENITOURINARY:  No dysuria, frequency or urgency. No Blood in urine  MUSCULOSKELETAL:  no joint aches, no muscle pain  SKIN:  No change in skin, hair or nails.  NEUROLOGIC:  No paresthesias or weakness.  PSYCHIATRIC:  No disorder of thought or mood.  ENDOCRINE:  No heat or cold intolerance, polyuria or polydipsia.  HEMATOLOGICAL:  No easy bruising or bleeding.      Physical Exam:  Vital Signs Last 24 Hrs  T(C): 36.8 (11 Oct 2022 12:59), Max: 37.2 (11 Oct 2022 04:12)  T(F): 98.2 (11 Oct 2022 12:59), Max: 99 (11 Oct 2022 04:12)  HR: 71 (11 Oct 2022 12:59) (70 - 74)  BP: 142/79 (11 Oct 2022 12:59) (124/68 - 142/79)  BP(mean): --  RR: 18 (11 Oct 2022 12:59) (18 - 18)  SpO2: 93% (11 Oct 2022 12:59) (93% - 93%)  Parameters below as of 11 Oct 2022 12:59  Patient On (Oxygen Delivery Method): nasal cannula  O2 Flow (L/min): 1  GEN: NAD  HEENT: normocephalic and atraumatic. EOMI. DERRICK.    NECK: Supple.  No lymphadenopathy   LUNGS: mild wheezing and rhonchi   HEART: Regular rate and rhythm   ABDOMEN: Soft, nontender, and nondistended.  Positive bowel sounds.    : No CVA tenderness  EXTREMITIES: Without edema.  MSK: no joint swelling  NEUROLOGIC: grossly intact.  PSYCHIATRIC: Appropriate affect .  SKIN: No ulceration or rash       Labs:                        8.3    4.07  )-----------( 369      ( 11 Oct 2022 06:30 )             27.6     10-11    147<H>  |  113<H>  |  26<H>  ----------------------------<  118<H>  4.3   |  29  |  0.98    Ca    8.3<L>      11 Oct 2022 06:30    TPro  6.5  /  Alb  2.5<L>  /  TBili  0.4  /  DBili  x   /  AST  8<L>  /  ALT  13  /  AlkPhos  68  10-11      Culture - Sputum (collected 10-07-22 @ 16:00)  Source: .Sputum Sputum  Gram Stain (10-08-22 @ 06:22):    Few polymorphonuclear leukocytes per low power field    No Squamous epithelial cells per low power field    Rare Gram positive cocci in pairs per oil power field    Rare Gram Negative Coccobacilli seen per oil power field  Final Report (10-09-22 @ 16:48):    Normal Respiratory Marilee present    Culture - Blood (collected 10-06-22 @ 22:10)  Source: .Blood Blood-Peripheral    Culture - Blood (collected 10-06-22 @ 22:00)  Source: .Blood Blood-Peripheral    Culture - Urine (collected 10-05-22 @ 15:30)  Source: Clean Catch Clean Catch (Midstream)  Final Report (10-06-22 @ 18:54):    <10,000 CFU/mL Normal Urogenital Marilee    Culture - Blood (collected 10-05-22 @ 13:40)  Source: .Blood Blood-Peripheral  Final Report (10-10-22 @ 19:01):    No Growth Final    Culture - Blood (collected 10-05-22 @ 13:25)  Source: .Blood Blood-Peripheral  Final Report (10-10-22 @ 19:01):    No Growth Final    WBC Count: 4.07 K/uL (10-11-22 @ 06:30)  WBC Count: 7.12 K/uL (10-10-22 @ 07:30)  WBC Count: 12.50 K/uL (10-09-22 @ 06:38)  WBC Count: 15.25 K/uL (10-08-22 @ 07:52)  WBC Count: 16.92 K/uL (10-07-22 @ 06:50)    Creatinine, Serum: 0.98 mg/dL (10-11-22 @ 06:30)  Creatinine, Serum: 1.10 mg/dL (10-10-22 @ 07:30)  Creatinine, Serum: 1.30 mg/dL (10-09-22 @ 06:38)  Creatinine, Serum: 1.60 mg/dL (10-08-22 @ 07:52)  Creatinine, Serum: 1.60 mg/dL (10-07-22 @ 06:50)    Procalcitonin, Serum: 0.69 ng/mL (10-08-22 @ 07:52)  Procalcitonin, Serum: 1.04 ng/mL (10-06-22 @ 07:30)     COVID-19 PCR: NotDetec (10-05-22 @ 13:40)  SARS-CoV-2: NotDetec (10-05-22 @ 13:40)      All imaging and data are reviewed.   < from: CT Chest No Cont (10.06.22 @ 09:04) >  IMPRESSION:  Right lower lobe consolidation compatible with pneumonia. Recommend CT   chest follow-up to ensure clearing.  Small right pleural effusion.    Assessment and Plan:   81-year-old  female with past medical history of chronic bronchitis, pulmonary embolism s/p IVC filter, paroxysmal A. fib with chronic Coumadin, IgG deficiency, CKD, dyslipidemia presented with confusion and generalized weakness being admitted for acute metabolic encephalopathy, acute respiratory distress/Failure with right lower lobe community-acquired pneumonia (with history of pneumonia/parainfluenza in April 2022)  Had IvIg last month as per patient; seen by Dr. Parker.     No fever today, WBC; 4k, blood cultures negative to date. SBO seems better, NG tube fell off and now without NG doing well.     Recommendations:   - Sputum culture with normal respiratory marilee   - Legionella U ag negative   - MRSA PCR negative   - Completed 5days of ceftriaxone   - Stop azithromycin   - Can treat for total 5days.   - SBO treatment as per surgery     Will sign off please call with any question.     Sea Donnelly MD  Division of Infectious Diseases   Please call ID service at [phone] with any question.      35 minutes spent on total encounter assessing patient, examination, chart review, counseling and coordinating care by the attending physician/nurse/care manager.

## 2022-10-11 NOTE — PROGRESS NOTE ADULT - SUBJECTIVE AND OBJECTIVE BOX
SURGERY Progress Note :    S: Patient seen and examined at bedside.   Patient is doing well this morning, seen and examined at bedside, denies any new complaints. Denies any nausea, vomiting, chest pain, shortness of breath, calf tenderness, fever or chills. Reports +flatus but no bowel movement yet. Patient has been passing has however.  NGT in place  and clamped. Follow up Residuals later this morning.     MEDICATIONS:  acetaminophen     Tablet .. 650 milliGRAM(s) Oral every 6 hours PRN  aMIOdarone    Tablet 100 milliGRAM(s) Oral daily  budesonide  80 MICROgram(s)/formoterol 4.5 MICROgram(s) Inhaler 2 Puff(s) Inhalation two times a day  dextrose 5% + sodium chloride 0.45%. 1000 milliLiter(s) IV Continuous <Continuous>  dextrose 5%. 1000 milliLiter(s) IV Continuous <Continuous>  dextrose 5%. 1000 milliLiter(s) IV Continuous <Continuous>  dextrose 50% Injectable 25 Gram(s) IV Push once  dextrose 50% Injectable 12.5 Gram(s) IV Push once  dextrose 50% Injectable 25 Gram(s) IV Push once  dextrose Oral Gel 15 Gram(s) Oral once PRN  diltiazem    Tablet 30 milliGRAM(s) Oral two times a day  gabapentin 400 milliGRAM(s) Oral two times a day  glucagon  Injectable 1 milliGRAM(s) IntraMuscular once  insulin lispro (ADMELOG) corrective regimen sliding scale   SubCutaneous every 6 hours  metoprolol tartrate Injectable 2.5 milliGRAM(s) IV Push every 6 hours  montelukast 10 milliGRAM(s) Oral daily  ondansetron Injectable 4 milliGRAM(s) IV Push every 8 hours PRN  pantoprazole  Injectable 40 milliGRAM(s) IV Push two times a day  simvastatin 10 milliGRAM(s) Oral at bedtime  tiotropium 18 MICROgram(s) Capsule 1 Capsule(s) Inhalation daily  traMADol 50 milliGRAM(s) Oral two times a day      O:  Vital Signs Last 24 Hrs  T(C): 37.2 (11 Oct 2022 04:12), Max: 37.2 (11 Oct 2022 04:12)  T(F): 99 (11 Oct 2022 04:12), Max: 99 (11 Oct 2022 04:12)  HR: 74 (11 Oct 2022 04:12) (70 - 74)  BP: 124/68 (11 Oct 2022 04:12) (124/68 - 145/75)  BP(mean): --  RR: 18 (11 Oct 2022 04:12) (18 - 18)  SpO2: 93% (11 Oct 2022 04:12) (93% - 94%)    Parameters below as of 11 Oct 2022 04:12  Patient On (Oxygen Delivery Method): nasal cannula  O2 Flow (L/min): 2      I&O SUMMARY:    10-09-22 @ 07:01  -  10-10-22 @ 07:00  --------------------------------------------------------  IN: 975 mL / OUT: 1300 mL / NET: -325 mL    10-10-22 @ 07:01  -  10-11-22 @ 06:52  --------------------------------------------------------  IN: 450 mL / OUT: 480 mL / NET: -30 mL        PHYSICAL EXAM:  General:  in No Acute Distress   HEENT: NCAT, EOMI, NG tube in Place in place   Lungs: No Increased work of breathing on room Air   Card: Regular Rate and Rhythm   Abd: Soft, NT, Softly Distended.  No rebound/guarding.    Ext: Calves soft, NT, without edema bilat    LABS:                        8.0    7.12  )--------( 354      ( 10 Oct 2022 07:30 )             26.0     10-10    146<H>  |  113<H>  |  33<H>  ----------------------------<  92  4.6   |  26  |  1.10    Ca    8.7      10 Oct 2022 07:30    TPro  6.6  /  Alb  2.5<L>  /  TBili  0.4  /  DBili  x   /  AST  10<L>  /  ALT  14  /  AlkPhos  69  10-10    PT/INR - ( 10 Oct 2022 07:30 )   PT: 40.5 sec;   INR: 3.42 ratio                   RADIOLOGY:      10/9/22 XR Small Intestine Single Contrast study:  High grade Small Bowel Obstruction   SURGERY Progress Note :    S: Patient seen and examined at bedside.   Patient is doing well this morning, seen and examined at bedside, denies any new complaints. Denies any nausea, vomiting, chest pain, shortness of breath, calf tenderness, fever or chills. Reports +flatus but no bowel movement yet. Patient has been passing urine  however.  NGT in place  and clamped. Follow up Residuals later this morning.     MEDICATIONS:  acetaminophen     Tablet .. 650 milliGRAM(s) Oral every 6 hours PRN  aMIOdarone    Tablet 100 milliGRAM(s) Oral daily  budesonide  80 MICROgram(s)/formoterol 4.5 MICROgram(s) Inhaler 2 Puff(s) Inhalation two times a day  dextrose 5% + sodium chloride 0.45%. 1000 milliLiter(s) IV Continuous <Continuous>  dextrose 5%. 1000 milliLiter(s) IV Continuous <Continuous>  dextrose 5%. 1000 milliLiter(s) IV Continuous <Continuous>  dextrose 50% Injectable 25 Gram(s) IV Push once  dextrose 50% Injectable 12.5 Gram(s) IV Push once  dextrose 50% Injectable 25 Gram(s) IV Push once  dextrose Oral Gel 15 Gram(s) Oral once PRN  diltiazem    Tablet 30 milliGRAM(s) Oral two times a day  gabapentin 400 milliGRAM(s) Oral two times a day  glucagon  Injectable 1 milliGRAM(s) IntraMuscular once  insulin lispro (ADMELOG) corrective regimen sliding scale   SubCutaneous every 6 hours  metoprolol tartrate Injectable 2.5 milliGRAM(s) IV Push every 6 hours  montelukast 10 milliGRAM(s) Oral daily  ondansetron Injectable 4 milliGRAM(s) IV Push every 8 hours PRN  pantoprazole  Injectable 40 milliGRAM(s) IV Push two times a day  simvastatin 10 milliGRAM(s) Oral at bedtime  tiotropium 18 MICROgram(s) Capsule 1 Capsule(s) Inhalation daily  traMADol 50 milliGRAM(s) Oral two times a day      O:  Vital Signs Last 24 Hrs  T(C): 37.2 (11 Oct 2022 04:12), Max: 37.2 (11 Oct 2022 04:12)  T(F): 99 (11 Oct 2022 04:12), Max: 99 (11 Oct 2022 04:12)  HR: 74 (11 Oct 2022 04:12) (70 - 74)  BP: 124/68 (11 Oct 2022 04:12) (124/68 - 145/75)  BP(mean): --  RR: 18 (11 Oct 2022 04:12) (18 - 18)  SpO2: 93% (11 Oct 2022 04:12) (93% - 94%)    Parameters below as of 11 Oct 2022 04:12  Patient On (Oxygen Delivery Method): nasal cannula  O2 Flow (L/min): 2      I&O SUMMARY:    10-09-22 @ 07:01  -  10-10-22 @ 07:00  --------------------------------------------------------  IN: 975 mL / OUT: 1300 mL / NET: -325 mL    10-10-22 @ 07:01  -  10-11-22 @ 06:52  --------------------------------------------------------  IN: 450 mL / OUT: 480 mL / NET: -30 mL        PHYSICAL EXAM:  General:  in No Acute Distress   HEENT: NCAT, EOMI, NG tube in Place in place   Lungs: No Increased work of breathing on room Air   Card: Regular Rate and Rhythm   Abd: Soft, NT, Softly Distended.  No rebound/guarding.    Ext: Calves soft, NT, without edema bilat    LABS:                        8.0    7.12  )--------( 354      ( 10 Oct 2022 07:30 )             26.0     10-10    146<H>  |  113<H>  |  33<H>  ----------------------------<  92  4.6   |  26  |  1.10    Ca    8.7      10 Oct 2022 07:30    TPro  6.6  /  Alb  2.5<L>  /  TBili  0.4  /  DBili  x   /  AST  10<L>  /  ALT  14  /  AlkPhos  69  10-10    PT/INR - ( 10 Oct 2022 07:30 )   PT: 40.5 sec;   INR: 3.42 ratio                   RADIOLOGY:      10/9/22 XR Small Intestine Single Contrast study:  High grade Small Bowel Obstruction

## 2022-10-11 NOTE — DIETITIAN INITIAL EVALUATION ADULT - PERTINENT LABORATORY DATA
10-11    147<H>  |  113<H>  |  26<H>  ----------------------------<  118<H>  4.3   |  29  |  0.98    Ca    8.3<L>      11 Oct 2022 06:30    TPro  6.5  /  Alb  2.5<L>  /  TBili  0.4  /  DBili  x   /  AST  8<L>  /  ALT  13  /  AlkPhos  68  10-11  POCT Blood Glucose.: 115 mg/dL (10-11-22 @ 11:38)  A1C with Estimated Average Glucose Result: 5.2 % (10-06-22 @ 07:30)  A1C with Estimated Average Glucose Result: 5.5 % (04-21-22 @ 11:18)

## 2022-10-11 NOTE — DISCHARGE NOTE PROVIDER - NSDCMRMEDTOKEN_GEN_ALL_CORE_FT
amiodarone 100 mg oral tablet: 1 tab(s) orally once a day  Bentyl 10 mg oral capsule: 1 cap(s) orally 2 times a day, As Needed  Cardizem: 25 milligram(s) orally 2 times a day  Coumadin 4 mg oral tablet: 1 tab(s) orally once a day  4mg 4x week; 6mg 3x week  folic acid 1 mg oral tablet: 1 tab(s) orally once a day  gabapentin 400 mg oral capsule: 1 cap(s) orally 2 times a day  NexIUM: 15 milligram(s) orally once a day  Procrit 10,000 units/mL preservative-free injectable solution: injectable once a week  simvastatin 10 mg oral tablet: 1 tab(s) orally once a day (at bedtime)  Singulair 10 mg oral tablet: 1 tab(s) orally once a day  traMADol 50 mg oral tablet: 1 tab(s) orally 2 times a day  Trelegy Ellipta: inhaled once a day   amiodarone 100 mg oral tablet: 1 tab(s) orally once a day  Bentyl 10 mg oral capsule: 1 cap(s) orally 2 times a day, As Needed  Cardizem: 25 milligram(s) orally 2 times a day  Coumadin 4 mg oral tablet: 1 tab(s) orally once a day  4mg 4x week; 6mg 3x week  folic acid 1 mg oral tablet: 1 tab(s) orally once a day  gabapentin 400 mg oral capsule: 1 cap(s) orally 2 times a day  guaiFENesin 100 mg/5 mL oral liquid: 10 milliliter(s) orally every 6 hours, As needed, Cough  NexIUM: 15 milligram(s) orally once a day  Procrit 10,000 units/mL preservative-free injectable solution: injectable once a week  simvastatin 10 mg oral tablet: 1 tab(s) orally once a day (at bedtime)  Singulair 10 mg oral tablet: 1 tab(s) orally once a day  traMADol 50 mg oral tablet: 1 tab(s) orally 2 times a day  Trelegy Ellipta: inhaled once a day

## 2022-10-11 NOTE — PROGRESS NOTE ADULT - ASSESSMENT
Prerenal azotemia, CKD 3a  Pneumonia  Diabetes  Hypertension  h/o CHF  Anemia      Improved and stable renal indices. Continue IV hydration. Change to D5W. Encourage Po intake. To  continue current meds. Monitor blood sugar levels. Insulin coverage as needed. Dietary restriction.   Monitor BP trend. Titrate BP meds as needed. Hematology follow up. Will follow electrolytes and renal function trend. Prerenal azotemia, CKD 3a  Pneumonia  Diabetes  Hypertension  h/o CHF  Anemia      Improved and stable renal indices. Continue IV hydration. Change to D5W. To  continue current meds. Monitor blood sugar levels. Insulin coverage as needed. Dietary restriction.   Monitor BP trend. Titrate BP meds as needed. Hematology follow up. Will follow electrolytes and renal function trend. Surgery follow up.

## 2022-10-11 NOTE — PROGRESS NOTE ADULT - SUBJECTIVE AND OBJECTIVE BOX
[facility] -- Sai Valle Pannella, Patel, Savella, Goodger: Office # [phone]    Follow Up:  Pneumonia, SBO, A fib    Subjective/Observations: Patient seen and examined. Patient awake, alert, resting in bed. No complaints of chest pain, dyspnea, palpitations or dizziness. No signs of orthopnea or PND. Tolerating O2 via nasal cannula.     REVIEW OF SYSTEMS: All other review of systems are negative unless indicated above    PAST MEDICAL & SURGICAL HISTORY:  COPD (chronic obstructive pulmonary disease)      DM (diabetes mellitus)      PAF (paroxysmal atrial fibrillation)  s/p ablation      Hiatal hernia      GIB (gastrointestinal bleeding)      S/P hysterectomy      S/P foot surgery      S/P knee surgery      After cataract  bilateral eye cataract surgically removed      Closed right hip fracture  rigth hip ORIF july 19 2016      S/P IVC filter  nov 2016      S/P carpal tunnel release  Right 2008 &amp; 6/27/17    MEDICATIONS  (STANDING):  aMIOdarone    Tablet 100 milliGRAM(s) Oral daily  budesonide  80 MICROgram(s)/formoterol 4.5 MICROgram(s) Inhaler 2 Puff(s) Inhalation two times a day  dextrose 5% + sodium chloride 0.45%. 1000 milliLiter(s) (75 mL/Hr) IV Continuous <Continuous>  dextrose 5%. 1000 milliLiter(s) (50 mL/Hr) IV Continuous <Continuous>  dextrose 5%. 1000 milliLiter(s) (100 mL/Hr) IV Continuous <Continuous>  dextrose 50% Injectable 25 Gram(s) IV Push once  dextrose 50% Injectable 12.5 Gram(s) IV Push once  dextrose 50% Injectable 25 Gram(s) IV Push once  diltiazem    Tablet 30 milliGRAM(s) Oral two times a day  gabapentin 400 milliGRAM(s) Oral two times a day  glucagon  Injectable 1 milliGRAM(s) IntraMuscular once  insulin lispro (ADMELOG) corrective regimen sliding scale   SubCutaneous three times a day before meals  insulin lispro (ADMELOG) corrective regimen sliding scale   SubCutaneous at bedtime  montelukast 10 milliGRAM(s) Oral daily  pantoprazole  Injectable 40 milliGRAM(s) IV Push two times a day  simvastatin 10 milliGRAM(s) Oral at bedtime  tiotropium 18 MICROgram(s) Capsule 1 Capsule(s) Inhalation daily  traMADol 50 milliGRAM(s) Oral two times a day    MEDICATIONS  (PRN):  acetaminophen     Tablet .. 650 milliGRAM(s) Oral every 6 hours PRN Temp greater or equal to 38C (100.4F), Mild Pain (1 - 3)  dextrose Oral Gel 15 Gram(s) Oral once PRN Blood Glucose LESS THAN 70 milliGRAM(s)/deciliter  ondansetron Injectable 4 milliGRAM(s) IV Push every 8 hours PRN Nausea and/or Vomiting    Allergies    No Known Allergies    Intolerances      Vital Signs Last 24 Hrs  T(C): 37.2 (11 Oct 2022 04:12), Max: 37.2 (11 Oct 2022 04:12)  T(F): 99 (11 Oct 2022 04:12), Max: 99 (11 Oct 2022 04:12)  HR: 74 (11 Oct 2022 04:12) (70 - 74)  BP: 124/68 (11 Oct 2022 04:12) (124/68 - 145/75)  BP(mean): --  RR: 18 (11 Oct 2022 04:12) (18 - 18)  SpO2: 93% (11 Oct 2022 04:12) (93% - 94%)    Parameters below as of 11 Oct 2022 04:12  Patient On (Oxygen Delivery Method): nasal cannula  O2 Flow (L/min): 2    I&O's Summary    10 Oct 2022 07:01  -  11 Oct 2022 07:00  --------------------------------------------------------  IN: 825 mL / OUT: 480 mL / NET: 345 mL          TELE:  60-70s   PHYSICAL EXAM:  Constitutional: NAD, awake and alert, Obese   HEENT: Moist Mucous Membranes, Anicteric  Pulmonary: Non-labored, breath sounds are clear bilaterally, No wheezing, rales or rhonchi  Cardiovascular: Regular, S1 and S2, No murmurs, No rubs, gallops or clicks  Gastrointestinal:  soft, nontender, nondistended   Lymph: No peripheral edema. No lymphadenopathy.   Skin: No visible rashes or ulcers.  Psych:  Mood & affect appropriate      LABS: All Labs Reviewed:                        8.3    4.07  )-----------( 369      ( 11 Oct 2022 06:30 )             27.6                         8.0    7.12  )-----------( 354      ( 10 Oct 2022 07:30 )             26.0                         8.7    12.50 )-----------( 343      ( 09 Oct 2022 06:38 )             27.2     11 Oct 2022 06:30    147    |  113    |  26     ----------------------------<  118    4.3     |  29     |  0.98   10 Oct 2022 07:30    146    |  113    |  33     ----------------------------<  92     4.6     |  26     |  1.10   09 Oct 2022 06:38    142    |  107    |  34     ----------------------------<  94     4.6     |  28     |  1.30     Ca    8.3        11 Oct 2022 06:30  Ca    8.7        10 Oct 2022 07:30  Ca    8.7        09 Oct 2022 06:38    TPro  6.5    /  Alb  2.5    /  TBili  0.4    /  DBili  x      /  AST  8      /  ALT  13     /  AlkPhos  68     11 Oct 2022 06:30  TPro  6.6    /  Alb  2.5    /  TBili  0.4    /  DBili  x      /  AST  10     /  ALT  14     /  AlkPhos  69     10 Oct 2022 07:30  TPro  6.7    /  Alb  2.5    /  TBili  0.5    /  DBili  x      /  AST  10     /  ALT  16     /  AlkPhos  70     09 Oct 2022 06:38   LIVER FUNCTIONS - ( 11 Oct 2022 06:30 )  Alb: 2.5 g/dL / Pro: 6.5 g/dL / ALK PHOS: 68 U/L / ALT: 13 U/L / AST: 8 U/L / GGT: x           PT/INR - ( 11 Oct 2022 06:30 )   PT: 41.0 sec;   INR: 3.46 ratio           12 Lead ECG:   Ventricular Rate 79 BPM    Atrial Rate 79 BPM    P-R Interval 136 ms    QRS Duration 78 ms    Q-T Interval 384 ms    QTC Calculation(Bazett) 440 ms    P Axis 85 degrees    R Axis -31 degrees    T Axis 72 degrees    Diagnosis Line Normal sinus rhythm  Left axis deviation  Confirmed by DAVID MELGOZA ([id number]) on 10/5/2022 5:32:59 PM (10-05-22 @ 13:06)       EXAM:  ECHO TTE W/O CON COMP W/DOPPLR           PROCEDURE DATE:  07/17/2016      INTERPRETATION:  Ordering Physician: LIA MOLINA    Indication: Syncope    Study Quality: Technically difficult and limited     Height: 167 cm  Weight: 91 kg  BSA: 2.0 sq m  Blood Pressure: 104/70    MEASUREMENTS  IVS: 1.4cm  PWT: 1.4cm  LA: 4.4cm  AO: 3.6cm  LVIDd: 4.6cm  LVIDs: 2.4cm    FINDINGS  Left Ventricle: The endocardium is not well-visualized. Grossly, there   appears to be normal left ventricular systolic function.  Aortic Valve: Calcified trileaflet aortic valve with normal opening. No   significant aortic insufficiency.  Mitral Valve: Mitral annular calcification and calcified mitral valve   leaflets. Mild mitral insufficiency.  Tricuspid Valve: Not well visualized. Grossly normal. Mild tricuspid   insufficiency.  Pulmonic Valve: Not well visualized. Mild pulmonic insufficiency.  Left Atrium: Enlarged  Right Ventricle: Normal right ventricular size and systolic function.  Right Atrium: Enlarged  Diastolic function: Grade 1 diastolic dysfunction.      DAVID MELGOZA M.D., ATTENDING CARDIOLOGIST  This document has been electronically signed. Jul 18 2016  1:12P

## 2022-10-11 NOTE — PROGRESS NOTE ADULT - SUBJECTIVE AND OBJECTIVE BOX
All interim records and events noted.    comfortable, no pain , no SOB      MEDICATIONS  (STANDING):  aMIOdarone    Tablet 100 milliGRAM(s) Oral daily  budesonide  80 MICROgram(s)/formoterol 4.5 MICROgram(s) Inhaler 2 Puff(s) Inhalation two times a day  dextrose 5%. 1000 milliLiter(s) (75 mL/Hr) IV Continuous <Continuous>  dextrose 5%. 1000 milliLiter(s) (100 mL/Hr) IV Continuous <Continuous>  dextrose 5%. 1000 milliLiter(s) (50 mL/Hr) IV Continuous <Continuous>  dextrose 50% Injectable 25 Gram(s) IV Push once  dextrose 50% Injectable 12.5 Gram(s) IV Push once  dextrose 50% Injectable 25 Gram(s) IV Push once  diltiazem    Tablet 30 milliGRAM(s) Oral two times a day  gabapentin 400 milliGRAM(s) Oral two times a day  glucagon  Injectable 1 milliGRAM(s) IntraMuscular once  insulin lispro (ADMELOG) corrective regimen sliding scale   SubCutaneous three times a day before meals  insulin lispro (ADMELOG) corrective regimen sliding scale   SubCutaneous at bedtime  montelukast 10 milliGRAM(s) Oral daily  pantoprazole  Injectable 40 milliGRAM(s) IV Push two times a day  simvastatin 10 milliGRAM(s) Oral at bedtime  tiotropium 18 MICROgram(s) Capsule 1 Capsule(s) Inhalation daily  traMADol 50 milliGRAM(s) Oral two times a day    MEDICATIONS  (PRN):  acetaminophen     Tablet .. 650 milliGRAM(s) Oral every 6 hours PRN Temp greater or equal to 38C (100.4F), Mild Pain (1 - 3)  dextrose Oral Gel 15 Gram(s) Oral once PRN Blood Glucose LESS THAN 70 milliGRAM(s)/deciliter  guaiFENesin Oral Liquid (Sugar-Free) 200 milliGRAM(s) Oral every 6 hours PRN Cough  ondansetron Injectable 4 milliGRAM(s) IV Push every 8 hours PRN Nausea and/or Vomiting      Vital Signs Last 24 Hrs  T(C): 36.8 (11 Oct 2022 12:59), Max: 37.2 (11 Oct 2022 04:12)  T(F): 98.2 (11 Oct 2022 12:59), Max: 99 (11 Oct 2022 04:12)  HR: 71 (11 Oct 2022 12:59) (70 - 74)  BP: 142/79 (11 Oct 2022 12:59) (124/68 - 142/79)  BP(mean): --  RR: 18 (11 Oct 2022 12:59) (18 - 18)  SpO2: 93% (11 Oct 2022 12:59) (93% - 93%)    Parameters below as of 11 Oct 2022 12:59  Patient On (Oxygen Delivery Method): nasal cannula  O2 Flow (L/min): 1      PHYSICAL EXAM  General: well developed  well nourished, in no acute distress  Head: atraumatic, normocephalic  ENT: sclera anicteric  Neck: supple, trachea midline  CV: S1 S2, regular rate and rhythm  Lungs: clear to auscultation, no wheezes/rhonchi  Abdomen: soft, nontender, bowel sounds present, no palpable hepatosplenomegaly  Extrem: no clubbing/cyanosis/edema  Skin: no significant increased ecchymosis/petechiae  Neuro: alert and oriented X3,  no focal deficits      LABS:             8.3    4.07  )-----------( 369      ( 10-11 @ 06:30 )             27.6                8.0    7.12  )-----------( 354      ( 10-10 @ 07:30 )             26.0                8.7    12.50 )-----------( 343      ( 10-09 @ 06:38 )             27.2       10-11    147<H>  |  113<H>  |  26<H>  ----------------------------<  118<H>  4.3   |  29  |  0.98    Ca    8.3<L>      11 Oct 2022 06:30    TPro  6.5  /  Alb  2.5<L>  /  TBili  0.4  /  DBili  x   /  AST  8<L>  /  ALT  13  /  AlkPhos  68  10-11    10-11 @ 06:30  PT41.0 INR3.46  PTT--  10-10 @ 07:30  PT40.5 INR3.42  PTT--  10-09 @ 12:34  PT35.8 INR3.03  PTT--  10-08 @ 07:52  PT31.7 INR2.68  PTT--  10-07 @ 06:50  PT27.1 INR2.30  PTT--      RADIOLOGY & ADDITIONAL STUDIES:    IMPRESSION/RECOMMENDATIONS:

## 2022-10-11 NOTE — PROGRESS NOTE ADULT - ASSESSMENT
Patient with mutlifactorial anemia including anemia secondary to renal insufficiency, myelodysplasia, exacerbated by adm for pneumonia  patient on Procrit as outpatient and has had prn PRBC transfusions  s/p 1 unit PRBC with improvement in hgb 9.1  post NG tube for SBO, now resolved(tube "fell out"      -CBC stable today  -continue acute care  -cont follow serial CBC,

## 2022-10-11 NOTE — DISCHARGE NOTE PROVIDER - CARE PROVIDER_API CALL
Rashid Stout)  Internal Medicine  [address] [place] [address]  Phone: [phone]  Fax: [phone]  Follow Up Time:

## 2022-10-11 NOTE — DIETITIAN INITIAL EVALUATION ADULT - NSFNSGIIOFT_GEN_A_CORE
10-10-22 @ 07:01  -  10-11-22 @ 07:00  --------------------------------------------------------  OUT:    Nasogastric/Oral tube (mL): 130 mL  Total OUT: 130 mL    Total NET: -130 mL

## 2022-10-11 NOTE — PROGRESS NOTE ADULT - ASSESSMENT
82 yo F w extensive PMHx admitted with pneumonia with CT findings c/w SBO. Patient has been urinating and passing gas.     Patient passing flatus   NPO/IVF   NGT clamp trial   Continue care per primary team        80 yo F w extensive PMHx admitted with pneumonia with CT findings c/w SBO. Patient has been urinating and passing flatus.        NPO/IVF   NGT clamp trial, Residuals at 11 am.   Start CLD for residuals < 200 ml   Continue care per primary team        82 yo F w extensive PMHx admitted with pneumonia with CT findings c/w SBO. Patient has been urinating and passing flatus.        NPO/IVF   NGT clamp trial, Residuals at 11 am.   Start CLD for residuals < 200 ml   Continue care per primary team   Surg. Att.  Pt is well. Tolerated clears.  Abd is soft and nontender.  Advance diet as tolerated.

## 2022-10-11 NOTE — DISCHARGE NOTE PROVIDER - NSDCFUSCHEDAPPT_GEN_ALL_CORE_FT
Rashid Stout  [facility] Physician [facility]  CARDIOLOGY [address] [facility]  Scheduled Appointment: 12/28/2022

## 2022-10-11 NOTE — PROGRESS NOTE ADULT - SUBJECTIVE AND OBJECTIVE BOX
Patient is a 81y old  Female who presents with a chief complaint of Pneumonia (11 Oct 2022 06:51)      INTERVAL HPI/OVERNIGHT EVENTS: Patient seen and examined at bedside. No overnight events. Denies bowel movement. Admits flatus. NGT in place and clamped. Denies fever, chills, chest pain, shortness of breath, abdominal pain, nausea/vomiting, headache.    MEDICATIONS  (STANDING):  aMIOdarone    Tablet 100 milliGRAM(s) Oral daily  budesonide  80 MICROgram(s)/formoterol 4.5 MICROgram(s) Inhaler 2 Puff(s) Inhalation two times a day  dextrose 5% + sodium chloride 0.45%. 1000 milliLiter(s) (75 mL/Hr) IV Continuous <Continuous>  dextrose 5%. 1000 milliLiter(s) (50 mL/Hr) IV Continuous <Continuous>  dextrose 5%. 1000 milliLiter(s) (100 mL/Hr) IV Continuous <Continuous>  dextrose 50% Injectable 25 Gram(s) IV Push once  dextrose 50% Injectable 12.5 Gram(s) IV Push once  dextrose 50% Injectable 25 Gram(s) IV Push once  diltiazem    Tablet 30 milliGRAM(s) Oral two times a day  gabapentin 400 milliGRAM(s) Oral two times a day  glucagon  Injectable 1 milliGRAM(s) IntraMuscular once  insulin lispro (ADMELOG) corrective regimen sliding scale   SubCutaneous every 6 hours  metoprolol tartrate Injectable 2.5 milliGRAM(s) IV Push every 6 hours  montelukast 10 milliGRAM(s) Oral daily  pantoprazole  Injectable 40 milliGRAM(s) IV Push two times a day  simvastatin 10 milliGRAM(s) Oral at bedtime  tiotropium 18 MICROgram(s) Capsule 1 Capsule(s) Inhalation daily  traMADol 50 milliGRAM(s) Oral two times a day    MEDICATIONS  (PRN):  acetaminophen     Tablet .. 650 milliGRAM(s) Oral every 6 hours PRN Temp greater or equal to 38C (100.4F), Mild Pain (1 - 3)  dextrose Oral Gel 15 Gram(s) Oral once PRN Blood Glucose LESS THAN 70 milliGRAM(s)/deciliter  ondansetron Injectable 4 milliGRAM(s) IV Push every 8 hours PRN Nausea and/or Vomiting      Allergies    No Known Allergies    Intolerances        REVIEW OF SYSTEMS:  CONSTITUTIONAL: No fever or chills  HEENT:  No headache, no sore throat  RESPIRATORY: No cough, wheezing, or shortness of breath  CARDIOVASCULAR: No chest pain, palpitations  GASTROINTESTINAL: No abd pain, nausea, vomiting, or diarrhea  GENITOURINARY: No dysuria, frequency, or hematuria  NEUROLOGICAL: no focal weakness or dizziness  MUSCULOSKELETAL: no myalgias     Vital Signs Last 24 Hrs  T(C): 37.2 (11 Oct 2022 04:12), Max: 37.2 (11 Oct 2022 04:12)  T(F): 99 (11 Oct 2022 04:12), Max: 99 (11 Oct 2022 04:12)  HR: 74 (11 Oct 2022 04:12) (70 - 74)  BP: 124/68 (11 Oct 2022 04:12) (124/68 - 145/75)  BP(mean): --  RR: 18 (11 Oct 2022 04:12) (18 - 18)  SpO2: 93% (11 Oct 2022 04:12) (93% - 94%)    Parameters below as of 11 Oct 2022 04:12  Patient On (Oxygen Delivery Method): nasal cannula  O2 Flow (L/min): 2      PHYSICAL EXAM:  GENERAL: NAD  HEENT:  anicteric, moist mucous membranes  CHEST/LUNG:  CTA b/l, no rales, wheezes, or rhonchi  HEART:  RRR, S1, S2  ABDOMEN:  BS+, soft, nontender, nondistended  EXTREMITIES: no edema, cyanosis, or calf tenderness  NERVOUS SYSTEM: answers questions and follows commands appropriately    LABS:                        8.3    4.07  )-----------( 369      ( 11 Oct 2022 06:30 )             27.6     CBC Full  -  ( 11 Oct 2022 06:30 )  WBC Count : 4.07 K/uL  Hemoglobin : 8.3 g/dL  Hematocrit : 27.6 %  Platelet Count - Automated : 369 K/uL  Mean Cell Volume : 104.2 fl  Mean Cell Hemoglobin : 31.3 pg  Mean Cell Hemoglobin Concentration : 30.1 gm/dL  Auto Neutrophil # : 2.35 K/uL  Auto Lymphocyte # : 0.95 K/uL  Auto Monocyte # : 0.54 K/uL  Auto Eosinophil # : 0.02 K/uL  Auto Basophil # : 0.01 K/uL  Auto Neutrophil % : 57.8 %  Auto Lymphocyte % : 23.3 %  Auto Monocyte % : 13.3 %  Auto Eosinophil % : 0.5 %  Auto Basophil % : 0.2 %    11 Oct 2022 06:30    147    |  113    |  26     ----------------------------<  118    4.3     |  29     |  0.98     Ca    8.3        11 Oct 2022 06:30    TPro  6.5    /  Alb  2.5    /  TBili  0.4    /  DBili  x      /  AST  8      /  ALT  13     /  AlkPhos  68     11 Oct 2022 06:30    PT/INR - ( 11 Oct 2022 06:30 )   PT: 41.0 sec;   INR: 3.46 ratio             CAPILLARY BLOOD GLUCOSE      POCT Blood Glucose.: 120 mg/dL (11 Oct 2022 07:36)  POCT Blood Glucose.: 100 mg/dL (10 Oct 2022 23:54)  POCT Blood Glucose.: 103 mg/dL (10 Oct 2022 18:29)  POCT Blood Glucose.: 93 mg/dL (10 Oct 2022 11:49)        Culture - Sputum (collected 10-07-22 @ 16:00)  Source: .Sputum Sputum  Gram Stain (10-08-22 @ 06:22):    Few polymorphonuclear leukocytes per low power field    No Squamous epithelial cells per low power field    Rare Gram positive cocci in pairs per oil power field    Rare Gram Negative Coccobacilli seen per oil power field  Final Report (10-09-22 @ 16:48):    Normal Respiratory Ginger present    Culture - Blood (collected 10-06-22 @ 22:10)  Source: .Blood Blood-Peripheral  Preliminary Report (10-08-22 @ 04:01):    No growth to date.    Culture - Blood (collected 10-06-22 @ 22:00)  Source: .Blood Blood-Peripheral  Preliminary Report (10-08-22 @ 04:01):    No growth to date.    Culture - Urine (collected 10-05-22 @ 15:30)  Source: Clean Catch Clean Catch (Midstream)  Final Report (10-06-22 @ 18:54):    <10,000 CFU/mL Normal Urogenital Ginger    Culture - Blood (collected 10-05-22 @ 13:40)  Source: .Blood Blood-Peripheral  Final Report (10-10-22 @ 19:01):    No Growth Final    Culture - Blood (collected 10-05-22 @ 13:25)  Source: .Blood Blood-Peripheral  Final Report (10-10-22 @ 19:01):    No Growth Final        RADIOLOGY & ADDITIONAL TESTS:    Personally reviewed.     Consultant(s) Notes Reviewed:  [x] YES  [ ] NO

## 2022-10-11 NOTE — PROGRESS NOTE ADULT - ASSESSMENT
81-year-old obese female past medical history previously diagnosed with DMII (no longer current), HTN, GERD, COPD not on home O2, pAfib s/p ablation currently on Coumadin, Aplastic Anemia, CKD, HFpEF admitted for pneumonia, course c/b pneumonia     HTN, PAFIB, Heart Failure   - Known A fib, tele with SR 60-70s, no events,  - Metoprolol recently d/c' d outpatient by Dr. Stout secondary to episodes of dizziness   - Continue home amiodarone 100 mg PO qd and Cardizem 30 mg BID   - Can use iv lopressor in place of po Cardizem and amiodarone if need to hold medicines   - on coumadin, which is to be held given SBO  - INR: 3.46 (10-11-22 @ 06:30)    - No meaningful signs of volume overload on exam   - Echo (4/2021) showed EF 57%   - Strict I/Os, daily weights.    - Pt moderate acute bowel obstruction, surgery following, GI following     - BP stable and controlled     - Monitor and replete lytes, keep K>4, Mg>2.  - Will continue to follow.    Oliver Olvera, MS FNP, AGAP  Nurse Practitioner- Cardiology   Spectra #[phone]

## 2022-10-11 NOTE — PROGRESS NOTE ADULT - PROBLEM SELECTOR PROBLEM 2
"     Patient ID: Leonid Diehl is a 67 y.o. male.  Bilateral knee pain  Leonid returns with continued bilateral knee pain.  He has had recent injection by another physician with really little to no relief and has questions about further options    Review of Systems:  Bilateral knee pain      Objective:    /69   Pulse 60   Ht 188 cm (74.02\")   Wt 132 kg (291 lb)   BMI 37.34 kg/m²     Physical Examination:      Both knees demonstrate no scars.  There is mild varus alignment with medial joint line tenderness and mild effusions, range of motion is 2 to 120 degrees with no instability and mild pseudolaxity.    Imaging:   Previous x-rays from 1 year ago demonstrate end-stage degenerative joint disease on my interpretation    Assessment:    Bilateral knee degenerative joint disease  Plan:   Options discussed, I have referred him to my partner for consideration of total knee arthroplasty      Procedures          Disclaimer: Part of this note may be an electronic transcription/translation of spoken language to printed text using the Dragon Dictation System  " Weakness

## 2022-10-11 NOTE — PROGRESS NOTE ADULT - SUBJECTIVE AND OBJECTIVE BOX
[facility]  Berhane Maldonado PA-C  [address] [place] [address]  [phone]      INTERVAL HPI/OVERNIGHT EVENTS:  Pt s/e  Per patient, NGT fell out  +flatus  No BM  No further GI complaints    MEDICATIONS  (STANDING):  aMIOdarone    Tablet 100 milliGRAM(s) Oral daily  budesonide  80 MICROgram(s)/formoterol 4.5 MICROgram(s) Inhaler 2 Puff(s) Inhalation two times a day  dextrose 5%. 1000 milliLiter(s) (75 mL/Hr) IV Continuous <Continuous>  dextrose 5%. 1000 milliLiter(s) (100 mL/Hr) IV Continuous <Continuous>  dextrose 5%. 1000 milliLiter(s) (50 mL/Hr) IV Continuous <Continuous>  dextrose 50% Injectable 25 Gram(s) IV Push once  dextrose 50% Injectable 12.5 Gram(s) IV Push once  dextrose 50% Injectable 25 Gram(s) IV Push once  diltiazem    Tablet 30 milliGRAM(s) Oral two times a day  gabapentin 400 milliGRAM(s) Oral two times a day  glucagon  Injectable 1 milliGRAM(s) IntraMuscular once  insulin lispro (ADMELOG) corrective regimen sliding scale   SubCutaneous three times a day before meals  insulin lispro (ADMELOG) corrective regimen sliding scale   SubCutaneous at bedtime  montelukast 10 milliGRAM(s) Oral daily  pantoprazole  Injectable 40 milliGRAM(s) IV Push two times a day  simvastatin 10 milliGRAM(s) Oral at bedtime  tiotropium 18 MICROgram(s) Capsule 1 Capsule(s) Inhalation daily  traMADol 50 milliGRAM(s) Oral two times a day    MEDICATIONS  (PRN):  acetaminophen     Tablet .. 650 milliGRAM(s) Oral every 6 hours PRN Temp greater or equal to 38C (100.4F), Mild Pain (1 - 3)  dextrose Oral Gel 15 Gram(s) Oral once PRN Blood Glucose LESS THAN 70 milliGRAM(s)/deciliter  ondansetron Injectable 4 milliGRAM(s) IV Push every 8 hours PRN Nausea and/or Vomiting      Allergies    No Known Allergies      PHYSICAL EXAM:   Vital Signs:  Vital Signs Last 24 Hrs  T(C): 37.2 (11 Oct 2022 04:12), Max: 37.2 (11 Oct 2022 04:12)  T(F): 99 (11 Oct 2022 04:12), Max: 99 (11 Oct 2022 04:12)  HR: 74 (11 Oct 2022 04:12) (70 - 74)  BP: 124/68 (11 Oct 2022 04:12) (124/68 - 145/75)  BP(mean): --  RR: 18 (11 Oct 2022 04:12) (18 - 18)  SpO2: 93% (11 Oct 2022 04:12) (93% - 94%)    Parameters below as of 11 Oct 2022 04:12  Patient On (Oxygen Delivery Method): nasal cannula  O2 Flow (L/min): 2    Daily     Daily Weight in k.4 (11 Oct 2022 04:12)    GENERAL:  Appears stated age  ABDOMEN:  Soft, non-tender, non-distended  NEURO:  Alert      LABS:                        8.3    4.07  )-----------( 369      ( 11 Oct 2022 06:30 )             27.6     10-11    147<H>  |  113<H>  |  26<H>  ----------------------------<  118<H>  4.3   |  29  |  0.98    Ca    8.3<L>      11 Oct 2022 06:30    TPro  6.5  /  Alb  2.5<L>  /  TBili  0.4  /  DBili  x   /  AST  8<L>  /  ALT  13  /  AlkPhos  68  10-11    PT/INR - ( 11 Oct 2022 06:30 )   PT: 41.0 sec;   INR: 3.46 ratio

## 2022-10-11 NOTE — DIETITIAN INITIAL EVALUATION ADULT - OTHER INFO
80 y/o female adm with pneumonia; course c/b SBO. PMH GI bleed, hiatal hernia, PAF, DM (no longer has DM), COPD. NGT d/c'd. NPO at time of visit this am. Diet has since been upgraded to clear liquids.

## 2022-10-11 NOTE — PROGRESS NOTE ADULT - SUBJECTIVE AND OBJECTIVE BOX
Patient is a 81y old  Female who presents with a chief complaint of Pneumonia (07 Oct 2022 11:21)    Patient seen in follow up for CKD.        PAST MEDICAL HISTORY:  COPD (chronic obstructive pulmonary disease)    DM (diabetes mellitus)    Pulmonary fibrosis    PAF (paroxysmal atrial fibrillation)    Hiatal hernia    GIB (gastrointestinal bleeding)    Pericarditis    Shoulder fracture, right    Hematoma      MEDICATIONS  (STANDING):  aMIOdarone    Tablet 100 milliGRAM(s) Oral daily  budesonide  80 MICROgram(s)/formoterol 4.5 MICROgram(s) Inhaler 2 Puff(s) Inhalation two times a day  dextrose 5% + sodium chloride 0.45%. 1000 milliLiter(s) (75 mL/Hr) IV Continuous <Continuous>  dextrose 5%. 1000 milliLiter(s) (50 mL/Hr) IV Continuous <Continuous>  dextrose 5%. 1000 milliLiter(s) (100 mL/Hr) IV Continuous <Continuous>  dextrose 50% Injectable 25 Gram(s) IV Push once  dextrose 50% Injectable 12.5 Gram(s) IV Push once  dextrose 50% Injectable 25 Gram(s) IV Push once  diltiazem    Tablet 30 milliGRAM(s) Oral two times a day  gabapentin 400 milliGRAM(s) Oral two times a day  glucagon  Injectable 1 milliGRAM(s) IntraMuscular once  insulin lispro (ADMELOG) corrective regimen sliding scale   SubCutaneous three times a day before meals  insulin lispro (ADMELOG) corrective regimen sliding scale   SubCutaneous at bedtime  montelukast 10 milliGRAM(s) Oral daily  pantoprazole  Injectable 40 milliGRAM(s) IV Push two times a day  simvastatin 10 milliGRAM(s) Oral at bedtime  tiotropium 18 MICROgram(s) Capsule 1 Capsule(s) Inhalation daily  traMADol 50 milliGRAM(s) Oral two times a day    MEDICATIONS  (PRN):  acetaminophen     Tablet .. 650 milliGRAM(s) Oral every 6 hours PRN Temp greater or equal to 38C (100.4F), Mild Pain (1 - 3)  dextrose Oral Gel 15 Gram(s) Oral once PRN Blood Glucose LESS THAN 70 milliGRAM(s)/deciliter  ondansetron Injectable 4 milliGRAM(s) IV Push every 8 hours PRN Nausea and/or Vomiting    T(C): 37.2 (10-11-22 @ 04:12), Max: 37.2 (10-09-22 @ 20:50)  HR: 74 (10-11-22 @ 04:12) (70 - 76)  BP: 124/68 (10-11-22 @ 04:12) (108/68 - 145/75)  RR: 18 (10-11-22 @ 04:12)  SpO2: 93% (10-11-22 @ 04:12)  Wt(kg): --  I&O's Detail    10 Oct 2022 07:01  -  11 Oct 2022 07:00  --------------------------------------------------------  IN:    dextrose 5% + sodium chloride 0.45%: 825 mL  Total IN: 825 mL    OUT:    Nasogastric/Oral tube (mL): 130 mL    Voided (mL): 350 mL  Total OUT: 480 mL    Total NET: 345 mL              PHYSICAL EXAM:  General: No distress  Respiratory: b/l air entry  Cardiovascular: S1 S2  Gastrointestinal: soft  Extremities:  edema                          LABORATORY:                        8.3    4.07  )-----------( 369      ( 11 Oct 2022 06:30 )             27.6     10-11    147<H>  |  113<H>  |  26<H>  ----------------------------<  118<H>  4.3   |  29  |  0.98    Ca    8.3<L>      11 Oct 2022 06:30    TPro  6.5  /  Alb  2.5<L>  /  TBili  0.4  /  DBili  x   /  AST  8<L>  /  ALT  13  /  AlkPhos  68  10-11    Sodium, Serum: 147 mmol/L (10-11 @ 06:30)  Sodium, Serum: 146 mmol/L (10-10 @ 07:30)    Potassium, Serum: 4.3 mmol/L (10-11 @ 06:30)  Potassium, Serum: 4.6 mmol/L (10-10 @ 07:30)    Hemoglobin: 8.3 g/dL (10-11 @ 06:30)  Hemoglobin: 8.0 g/dL (10-10 @ 07:30)  Hemoglobin: 8.7 g/dL (10-09 @ 06:38)    Creatinine, Serum 0.98 (10-11 @ 06:30)  Creatinine, Serum 1.10 (10-10 @ 07:30)  Creatinine, Serum 1.30 (10-09 @ 06:38)        LIVER FUNCTIONS - ( 11 Oct 2022 06:30 )  Alb: 2.5 g/dL / Pro: 6.5 g/dL / ALK PHOS: 68 U/L / ALT: 13 U/L / AST: 8 U/L / GGT: x              Patient is a 81y old  Female who presents with a chief complaint of Pneumonia (07 Oct 2022 11:21)    Patient seen in follow up for CKD.        PAST MEDICAL HISTORY:  COPD (chronic obstructive pulmonary disease)    DM (diabetes mellitus)    Pulmonary fibrosis    PAF (paroxysmal atrial fibrillation)    Hiatal hernia    GIB (gastrointestinal bleeding)    Pericarditis    Shoulder fracture, right    Hematoma      MEDICATIONS  (STANDING):  aMIOdarone    Tablet 100 milliGRAM(s) Oral daily  budesonide  80 MICROgram(s)/formoterol 4.5 MICROgram(s) Inhaler 2 Puff(s) Inhalation two times a day  dextrose 5% + sodium chloride 0.45%. 1000 milliLiter(s) (75 mL/Hr) IV Continuous <Continuous>  dextrose 5%. 1000 milliLiter(s) (50 mL/Hr) IV Continuous <Continuous>  dextrose 5%. 1000 milliLiter(s) (100 mL/Hr) IV Continuous <Continuous>  dextrose 50% Injectable 25 Gram(s) IV Push once  dextrose 50% Injectable 12.5 Gram(s) IV Push once  dextrose 50% Injectable 25 Gram(s) IV Push once  diltiazem    Tablet 30 milliGRAM(s) Oral two times a day  gabapentin 400 milliGRAM(s) Oral two times a day  glucagon  Injectable 1 milliGRAM(s) IntraMuscular once  insulin lispro (ADMELOG) corrective regimen sliding scale   SubCutaneous three times a day before meals  insulin lispro (ADMELOG) corrective regimen sliding scale   SubCutaneous at bedtime  montelukast 10 milliGRAM(s) Oral daily  pantoprazole  Injectable 40 milliGRAM(s) IV Push two times a day  simvastatin 10 milliGRAM(s) Oral at bedtime  tiotropium 18 MICROgram(s) Capsule 1 Capsule(s) Inhalation daily  traMADol 50 milliGRAM(s) Oral two times a day    MEDICATIONS  (PRN):  acetaminophen     Tablet .. 650 milliGRAM(s) Oral every 6 hours PRN Temp greater or equal to 38C (100.4F), Mild Pain (1 - 3)  dextrose Oral Gel 15 Gram(s) Oral once PRN Blood Glucose LESS THAN 70 milliGRAM(s)/deciliter  ondansetron Injectable 4 milliGRAM(s) IV Push every 8 hours PRN Nausea and/or Vomiting    T(C): 37.2 (10-11-22 @ 04:12), Max: 37.2 (10-09-22 @ 20:50)  HR: 74 (10-11-22 @ 04:12) (70 - 76)  BP: 124/68 (10-11-22 @ 04:12) (108/68 - 145/75)  RR: 18 (10-11-22 @ 04:12)  SpO2: 93% (10-11-22 @ 04:12)  Wt(kg): --  I&O's Detail    10 Oct 2022 07:01  -  11 Oct 2022 07:00  --------------------------------------------------------  IN:    dextrose 5% + sodium chloride 0.45%: 825 mL  Total IN: 825 mL    OUT:    Nasogastric/Oral tube (mL): 130 mL    Voided (mL): 350 mL  Total OUT: 480 mL    Total NET: 345 mL              PHYSICAL EXAM:  General: + NGT  Respiratory: b/l air entry  Cardiovascular: S1 S2  Gastrointestinal: soft  Extremities:  edema                          LABORATORY:                        8.3    4.07  )-----------( 369      ( 11 Oct 2022 06:30 )             27.6     10-11    147<H>  |  113<H>  |  26<H>  ----------------------------<  118<H>  4.3   |  29  |  0.98    Ca    8.3<L>      11 Oct 2022 06:30    TPro  6.5  /  Alb  2.5<L>  /  TBili  0.4  /  DBili  x   /  AST  8<L>  /  ALT  13  /  AlkPhos  68  10-11    Sodium, Serum: 147 mmol/L (10-11 @ 06:30)  Sodium, Serum: 146 mmol/L (10-10 @ 07:30)    Potassium, Serum: 4.3 mmol/L (10-11 @ 06:30)  Potassium, Serum: 4.6 mmol/L (10-10 @ 07:30)    Hemoglobin: 8.3 g/dL (10-11 @ 06:30)  Hemoglobin: 8.0 g/dL (10-10 @ 07:30)  Hemoglobin: 8.7 g/dL (10-09 @ 06:38)    Creatinine, Serum 0.98 (10-11 @ 06:30)  Creatinine, Serum 1.10 (10-10 @ 07:30)  Creatinine, Serum 1.30 (10-09 @ 06:38)        LIVER FUNCTIONS - ( 11 Oct 2022 06:30 )  Alb: 2.5 g/dL / Pro: 6.5 g/dL / ALK PHOS: 68 U/L / ALT: 13 U/L / AST: 8 U/L / GGT: x

## 2022-10-11 NOTE — PROGRESS NOTE ADULT - ASSESSMENT
SBO    NPO  IVF  NGT out  surgical eval noted  conservative management for now  advance diet per surgery team  will follow    I reviewed the overnight course of events on the unit, re-confirming the patient history. I discussed the care with the patient and their family  Differential diagnosis and plan of care discussed with patient after the evaluation  35 minutes spent on total encounter of which more than fifty percent of the encounter was spent counseling and/or coordinating care by the attending physician.  Advanced care planning was discussed with patient and family.  Advanced care planning forms were reviewed and discussed.  Risks, benefits and alternatives of gastroenterologic procedures were discussed in detail and all questions were answered.

## 2022-10-11 NOTE — PROGRESS NOTE ADULT - PROBLEM SELECTOR PLAN 2
Weakness due to community acquired pnemonia.  - Confusion resolved after her fever resolved.   - Rocephin  1 g IV daily with azithromycin 500 mg IV daily  - CXR: 1. Ill-defined increased density in the right lower lobe may be due to the overlying breast shadow however follow-up is recommended to help exclude an evolving right lower lobe infiltrate in this region. 2. No other significant findings.   - CT chest: Right lower lobe consolidation compatible with pneumonia. Recommend CT chest follow-up to ensure clearing. Small right pleural effusion.  - Had IVIG last month as per patient; no urgent need while acutely infected   - Procal 0.69, f/u sputum culture   - MRSA PCR negative  - Heme (Dr. Parker) consulted, recs appreciated: hold MIRIAM while hospitalized and will resume as an outpatient  - Pt with history of with history of Pulmonary emboli - S/P IVC filter  - ID (Dr. Carter) consulted, recs appreciated  - Dao Gallegos consulted, recs appreciated

## 2022-10-11 NOTE — PROGRESS NOTE ADULT - PROBLEM SELECTOR PLAN 4
Pt with CKD (G3a), baseline GFR ~42, Cr 1.2-1.3 at baseline per chart review  - Cr 1.1 today  - Monitor BMP  - Avoid nephrotoxic medications  - Monitor renal indices Resolved  - Pt with CKD (G3a), baseline GFR ~42, Cr 1.2-1.3 at baseline per chart review  - Monitor BMP  - Avoid nephrotoxic medications  - Monitor renal indices

## 2022-10-11 NOTE — DISCHARGE NOTE PROVIDER - HOSPITAL COURSE
ADMISSION H+P:    HPI:  81-year-old obese female past medical history previously diagnosied with DMII (no longer current), HTN, GERD, COPD not on home O2 (with history of Pulmonary emboli - S/P IVC filter - followed by GI bleed, ischemic colitis, shock, and prolonged respiratory failure), pAfib s/p ablation currently on Coumadin, Aplastic Anemia, CKD, presents to the ER with complaints of generalized weakness this morning. Patient presents with daughter who states that yesterday patient was at her baseline and cooked a large meal for a family gathering for the holiday. This morning she was on the toilet and unable to get up due to weakness. Patient was found to be febrile 103 at home. Daughter reports patient slightly more confused than usual which she tends to get when she has fevers. Patient denies nausea vomiting diarrhea hematuria dysuria. Patient denies chest pain shortness of breath and worsening cough, although she does have a baseline productive cough due to her underlying COPD. Denies any sick contacts and no one else in the home is having the same symptoms.  Patient is back at baseline mental status, A and O x3. able to answer questions appropriately    of note: patient has recent hospital stay for superimposed bacterial pneumonia early 2022.    ED Course  Vitals: T:103.8, HR 77, /50, RR 17, O2 96 on RA  Labs: Hg 9.1, INR 2.22, Cr 1.7, Lactate 2.2, UA clear,   Imaging: CXR: 1. Ill-defined increased density in the right lower lobe may be due to the overlying breast shadow however follow-up is recommended to help exclude an evolving right lower lobe infiltrate in this region. 2. No other significant findings.   Received: Vancomycin 1250mg x1, APAP 975mg x1, Zosyn 3.375g x1  EKG NSR without ischemic changes   (05 Oct 2022 16:45)      ---  HOSPITAL COURSE: Patient was admitted for management of RLL pneumonia. ID (Dr. Donnelly) was consulted. Patient was treated with course of IV Ceftriaxone and Azithromycin. Sputum culture with normal respiratory marilee. Legionella U ag, MRSA PCR negative. Hospital course was complicated by SBO. GI (Dr. Marquis) and surgery followed patient. Patient was made NPO and NGT was placed. Diet was advanced as tolerated. Cardio ([facility]) was consulted for afib, HTN and HF. Home Coumadin was held while patient was NPO and resumed after resolution of SBO. Given anemia, heme/onc (Dr. Case) was consulted. Patient received 1 unit pRBC during hospital course. Patient is MIRIAM dependent and does require occasional transfusions, however MIRIAM was held during hospitalization. Nephro (Dr. Chisholm) was consulted for prerenal azotemia, CKD 3a. Renal indices improved s/p IVF and were stable prior to discharge.     Patient was medically optimized and improved clinically throughout hospital course. Patient seen and examined on day of discharge.    Vital Signs    Physical Exam:    Patient is medically stable for discharge to ____ with outpatient follow up.  ---  CONSULTANTS:   Surgery: Dr. Jones  Cardio: [facility]  ID: Dr. Donnelly  GI: Dr. Marquis   Heme/Onc: Dr. Case  Nephro: Dr. Chisholm    ---  TIME SPENT:   I, the attending physician, was physically present for the key portions of the evaluation and management (E/M) service provided. The total amount of time spent reviewing the hospital course, laboratory values, imaging findings, assessing/counseling the patient, discussing with consultant physicians, social work, nursing staff was -- minutes.     ---  FINAL DISCHARGE DIAGNOSIS LIST:  Please see last daily progress note for final discharge diagnoses         ADMISSION H+P:    HPI:  81-year-old obese female past medical history previously diagnosied with DMII (no longer current), HTN, GERD, COPD not on home O2 (with history of Pulmonary emboli - S/P IVC filter - followed by GI bleed, ischemic colitis, shock, and prolonged respiratory failure), pAfib s/p ablation currently on Coumadin, Aplastic Anemia, CKD, presents to the ER with complaints of generalized weakness this morning. Patient presents with daughter who states that yesterday patient was at her baseline and cooked a large meal for a family gathering for the holiday. This morning she was on the toilet and unable to get up due to weakness. Patient was found to be febrile 103 at home. Daughter reports patient slightly more confused than usual which she tends to get when she has fevers. Patient denies nausea vomiting diarrhea hematuria dysuria. Patient denies chest pain shortness of breath and worsening cough, although she does have a baseline productive cough due to her underlying COPD. Denies any sick contacts and no one else in the home is having the same symptoms.  Patient is back at baseline mental status, A and O x3. able to answer questions appropriately    of note: patient has recent hospital stay for superimposed bacterial pneumonia early 2022.    ED Course  Vitals: T:103.8, HR 77, /50, RR 17, O2 96 on RA  Labs: Hg 9.1, INR 2.22, Cr 1.7, Lactate 2.2, UA clear,   Imaging: CXR: 1. Ill-defined increased density in the right lower lobe may be due to the overlying breast shadow however follow-up is recommended to help exclude an evolving right lower lobe infiltrate in this region. 2. No other significant findings.   Received: Vancomycin 1250mg x1, APAP 975mg x1, Zosyn 3.375g x1  EKG NSR without ischemic changes   (05 Oct 2022 16:45)      ---  HOSPITAL COURSE: Patient was admitted for management of RLL pneumonia. ID (Dr. Donnelly) was consulted. Patient was treated with course of IV Ceftriaxone and Azithromycin. Sputum culture with normal respiratory marilee. Legionella U ag, MRSA PCR negative. Hospital course was complicated by SBO. GI (Dr. Marquis) and surgery followed patient. Patient was made NPO and NGT was placed. Diet was advanced as tolerated. Cardio ([facility]) was consulted for afib, HTN and HF. Home Coumadin was held while patient was NPO and resumed after resolution of SBO. Given anemia, heme/onc (Dr. Case) was consulted. Patient received 1 unit pRBC during hospital course. Patient is MIRIAM dependent and does require occasional transfusions, however MIRIAM was held during hospitalization. Nephro (Dr. Chisholm) was consulted for prerenal azotemia, CKD 3a. Renal indices improved s/p IVF and were stable prior to discharge. Supplemental oxygen was provided on discharge.     Patient was medically optimized and improved clinically throughout hospital course. Patient seen and examined on day of discharge.    T(C): 36.6 (10-15-22 @ 05:20), Max: 36.9 (10-14-22 @ 13:17)  HR: 62 (10-15-22 @ 05:20) (62 - 71)  BP: 169/71 (10-15-22 @ 05:20) (144/71 - 169/71)  RR: 18 (10-15-22 @ 05:20) (18 - 18)  SpO2: 95% (10-15-22 @ 05:20) (94% - 98%)    GENERAL: patient appears well, no acute distress, appropriate, pleasant  EYES: sclera clear, no exudates  ENMT: oropharynx clear without erythema, no exudates, moist mucous membranes  NECK: supple, soft  LUNGS: good air entry bilaterally, clear to auscultation  HEART: soft S1/S2, regular rate and rhythm, no murmurs noted, no lower extremity edema  GASTROINTESTINAL: abdomen is soft, nontender, nondistended, normoactive bowel sounds  INTEGUMENT: warm, dry   MUSCULOSKELETAL: no clubbing or cyanosis, no obvious deformity  NEUROLOGIC: awake, alert, oriented x3, good muscle tone in 4 extremities, no obvious sensory deficits  PSYCHIATRIC: mood is good, affect is congruent, linear and logical thought process    Patient is medically stable for discharge to home with outpatient follow up.  ---  CONSULTANTS:   Surgery: Dr. Jones  Cardio: [facility]  ID: Dr. Donnelly  GI: Dr. Marquis   Heme/Onc: Dr. Case  Nephro: Dr. Chisholm    ---  TIME SPENT:   I, the attending physician, was physically present for the key portions of the evaluation and management (E/M) service provided. The total amount of time spent reviewing the hospital course, laboratory values, imaging findings, assessing/counseling the patient, discussing with consultant physicians, social work, nursing staff was 40 minutes.     ---  FINAL DISCHARGE DIAGNOSIS LIST:  Please see last daily progress note for final discharge diagnoses

## 2022-10-12 LAB
ALBUMIN SERPL ELPH-MCNC: 2.3 G/DL — LOW (ref 3.3–5)
ALP SERPL-CCNC: 59 U/L — SIGNIFICANT CHANGE UP (ref 40–120)
ALT FLD-CCNC: 12 U/L — SIGNIFICANT CHANGE UP (ref 12–78)
ANION GAP SERPL CALC-SCNC: 5 MMOL/L — SIGNIFICANT CHANGE UP (ref 5–17)
AST SERPL-CCNC: 8 U/L — LOW (ref 15–37)
BASOPHILS # BLD AUTO: 0.01 K/UL — SIGNIFICANT CHANGE UP (ref 0–0.2)
BASOPHILS NFR BLD AUTO: 0.4 % — SIGNIFICANT CHANGE UP (ref 0–2)
BILIRUB SERPL-MCNC: 0.4 MG/DL — SIGNIFICANT CHANGE UP (ref 0.2–1.2)
BUN SERPL-MCNC: 18 MG/DL — SIGNIFICANT CHANGE UP (ref 7–23)
CALCIUM SERPL-MCNC: 7.8 MG/DL — LOW (ref 8.5–10.1)
CHLORIDE SERPL-SCNC: 106 MMOL/L — SIGNIFICANT CHANGE UP (ref 96–108)
CO2 SERPL-SCNC: 29 MMOL/L — SIGNIFICANT CHANGE UP (ref 22–31)
CREAT SERPL-MCNC: 0.91 MG/DL — SIGNIFICANT CHANGE UP (ref 0.5–1.3)
CULTURE RESULTS: SIGNIFICANT CHANGE UP
CULTURE RESULTS: SIGNIFICANT CHANGE UP
EGFR: 64 ML/MIN/1.73M2 — SIGNIFICANT CHANGE UP
EOSINOPHIL # BLD AUTO: 0.02 K/UL — SIGNIFICANT CHANGE UP (ref 0–0.5)
EOSINOPHIL NFR BLD AUTO: 0.7 % — SIGNIFICANT CHANGE UP (ref 0–6)
GLUCOSE SERPL-MCNC: 118 MG/DL — HIGH (ref 70–99)
HCT VFR BLD CALC: 26.4 % — LOW (ref 34.5–45)
HGB BLD-MCNC: 7.9 G/DL — LOW (ref 11.5–15.5)
IMM GRANULOCYTES NFR BLD AUTO: 2.5 % — HIGH (ref 0–0.9)
INR BLD: 4.34 RATIO — HIGH (ref 0.88–1.16)
LYMPHOCYTES # BLD AUTO: 1.09 K/UL — SIGNIFICANT CHANGE UP (ref 1–3.3)
LYMPHOCYTES # BLD AUTO: 39.2 % — SIGNIFICANT CHANGE UP (ref 13–44)
MAGNESIUM SERPL-MCNC: 2.1 MG/DL — SIGNIFICANT CHANGE UP (ref 1.6–2.6)
MCHC RBC-ENTMCNC: 29.9 GM/DL — LOW (ref 32–36)
MCHC RBC-ENTMCNC: 30.9 PG — SIGNIFICANT CHANGE UP (ref 27–34)
MCV RBC AUTO: 103.1 FL — HIGH (ref 80–100)
MONOCYTES # BLD AUTO: 0.41 K/UL — SIGNIFICANT CHANGE UP (ref 0–0.9)
MONOCYTES NFR BLD AUTO: 14.7 % — HIGH (ref 2–14)
NEUTROPHILS # BLD AUTO: 1.18 K/UL — LOW (ref 1.8–7.4)
NEUTROPHILS NFR BLD AUTO: 42.5 % — LOW (ref 43–77)
NRBC # BLD: 0 /100 WBCS — SIGNIFICANT CHANGE UP (ref 0–0)
PLATELET # BLD AUTO: 361 K/UL — SIGNIFICANT CHANGE UP (ref 150–400)
POTASSIUM SERPL-MCNC: 4.5 MMOL/L — SIGNIFICANT CHANGE UP (ref 3.5–5.3)
POTASSIUM SERPL-SCNC: 4.5 MMOL/L — SIGNIFICANT CHANGE UP (ref 3.5–5.3)
PROT SERPL-MCNC: 6.1 G/DL — SIGNIFICANT CHANGE UP (ref 6–8.3)
PROTHROM AB SERPL-ACNC: 51.5 SEC — HIGH (ref 10.5–13.4)
RBC # BLD: 2.56 M/UL — LOW (ref 3.8–5.2)
RBC # FLD: 22.9 % — HIGH (ref 10.3–14.5)
SARS-COV-2 RNA SPEC QL NAA+PROBE: SIGNIFICANT CHANGE UP
SODIUM SERPL-SCNC: 140 MMOL/L — SIGNIFICANT CHANGE UP (ref 135–145)
SPECIMEN SOURCE: SIGNIFICANT CHANGE UP
SPECIMEN SOURCE: SIGNIFICANT CHANGE UP
WBC # BLD: 2.78 K/UL — LOW (ref 3.8–10.5)
WBC # FLD AUTO: 2.78 K/UL — LOW (ref 3.8–10.5)

## 2022-10-12 PROCEDURE — 99232 SBSQ HOSP IP/OBS MODERATE 35: CPT

## 2022-10-12 RX ORDER — DIPHENHYDRAMINE HCL 50 MG
25 CAPSULE ORAL ONCE
Refills: 0 | Status: COMPLETED | OUTPATIENT
Start: 2022-10-12 | End: 2022-10-12

## 2022-10-12 RX ORDER — ACETAMINOPHEN 500 MG
325 TABLET ORAL ONCE
Refills: 0 | Status: COMPLETED | OUTPATIENT
Start: 2022-10-12 | End: 2022-10-12

## 2022-10-12 RX ADMIN — SIMVASTATIN 10 MILLIGRAM(S): 20 TABLET, FILM COATED ORAL at 23:11

## 2022-10-12 RX ADMIN — Medication 325 MILLIGRAM(S): at 13:35

## 2022-10-12 RX ADMIN — Medication 30 MILLIGRAM(S): at 17:06

## 2022-10-12 RX ADMIN — PANTOPRAZOLE SODIUM 40 MILLIGRAM(S): 20 TABLET, DELAYED RELEASE ORAL at 05:39

## 2022-10-12 RX ADMIN — MONTELUKAST 10 MILLIGRAM(S): 4 TABLET, CHEWABLE ORAL at 12:06

## 2022-10-12 RX ADMIN — PANTOPRAZOLE SODIUM 40 MILLIGRAM(S): 20 TABLET, DELAYED RELEASE ORAL at 17:06

## 2022-10-12 RX ADMIN — GABAPENTIN 400 MILLIGRAM(S): 400 CAPSULE ORAL at 05:40

## 2022-10-12 RX ADMIN — Medication 25 MILLIGRAM(S): at 12:38

## 2022-10-12 RX ADMIN — TRAMADOL HYDROCHLORIDE 50 MILLIGRAM(S): 50 TABLET ORAL at 05:39

## 2022-10-12 RX ADMIN — AMIODARONE HYDROCHLORIDE 100 MILLIGRAM(S): 400 TABLET ORAL at 05:40

## 2022-10-12 RX ADMIN — Medication 30 MILLIGRAM(S): at 05:39

## 2022-10-12 RX ADMIN — SODIUM CHLORIDE 75 MILLILITER(S): 9 INJECTION, SOLUTION INTRAVENOUS at 01:25

## 2022-10-12 RX ADMIN — Medication 325 MILLIGRAM(S): at 12:37

## 2022-10-12 RX ADMIN — BUDESONIDE AND FORMOTEROL FUMARATE DIHYDRATE 2 PUFF(S): 160; 4.5 AEROSOL RESPIRATORY (INHALATION) at 05:46

## 2022-10-12 RX ADMIN — TRAMADOL HYDROCHLORIDE 50 MILLIGRAM(S): 50 TABLET ORAL at 23:11

## 2022-10-12 RX ADMIN — TIOTROPIUM BROMIDE 1 CAPSULE(S): 18 CAPSULE ORAL; RESPIRATORY (INHALATION) at 05:39

## 2022-10-12 RX ADMIN — GABAPENTIN 400 MILLIGRAM(S): 400 CAPSULE ORAL at 23:11

## 2022-10-12 NOTE — PROGRESS NOTE ADULT - PROBLEM SELECTOR PLAN 1
Improving. Patient c/o abdominal pain, nausea, vomiting on 10/7 2/2 small bowel obstruction  - Abdominal x-ray (10/8): suspicion for small bowel obstruction  - CT a/p: 1. Multiple dilated loops of small bowel with air-fluid levels. At least 2 sites of transition are identified in the right pelvis. Findings are consistent with moderate acute small bowel obstruction. 2. Moderate gastric distention related to the small bowel obstruction. 3. Right lower lobe pneumonia. Follow-up to resolution recommended.  - Advanced to regular diet  - s/p NGT  - Small bowel series with gastrografin: High-grade small bowel obstruction  - GI (Dr. Marquis) consulted, recs appreciated  - Surgery consulted, recs reppreciated

## 2022-10-12 NOTE — PROGRESS NOTE ADULT - SUBJECTIVE AND OBJECTIVE BOX
Patient is a 81y old  Female who presents with a chief complaint of Pneumonia (12 Oct 2022 10:17)    INTERVAL HPI/OVERNIGHT EVENTS: Patient seen and examined at bedside. No overnight events. Patient has no complaints at this time. Heme/Onc (Dr. Case) ordered for 2 U PRBC today. Advanced to regular diet today. Denies fever, chills, chest pain, shortness of breath, abdominal pain, nausea/vomiting, headache.    MEDICATIONS  (STANDING):  acetaminophen     Tablet .. 325 milliGRAM(s) Oral once  aMIOdarone    Tablet 100 milliGRAM(s) Oral daily  budesonide  80 MICROgram(s)/formoterol 4.5 MICROgram(s) Inhaler 2 Puff(s) Inhalation two times a day  dextrose 5%. 1000 milliLiter(s) (50 mL/Hr) IV Continuous <Continuous>  dextrose 5%. 1000 milliLiter(s) (75 mL/Hr) IV Continuous <Continuous>  dextrose 5%. 1000 milliLiter(s) (100 mL/Hr) IV Continuous <Continuous>  dextrose 50% Injectable 25 Gram(s) IV Push once  dextrose 50% Injectable 25 Gram(s) IV Push once  dextrose 50% Injectable 12.5 Gram(s) IV Push once  diltiazem    Tablet 30 milliGRAM(s) Oral two times a day  diphenhydrAMINE 25 milliGRAM(s) Oral once  gabapentin 400 milliGRAM(s) Oral two times a day  glucagon  Injectable 1 milliGRAM(s) IntraMuscular once  insulin lispro (ADMELOG) corrective regimen sliding scale   SubCutaneous three times a day before meals  insulin lispro (ADMELOG) corrective regimen sliding scale   SubCutaneous at bedtime  montelukast 10 milliGRAM(s) Oral daily  pantoprazole  Injectable 40 milliGRAM(s) IV Push two times a day  simvastatin 10 milliGRAM(s) Oral at bedtime  tiotropium 18 MICROgram(s) Capsule 1 Capsule(s) Inhalation daily  traMADol 50 milliGRAM(s) Oral two times a day    MEDICATIONS  (PRN):  acetaminophen     Tablet .. 650 milliGRAM(s) Oral every 6 hours PRN Temp greater or equal to 38C (100.4F), Mild Pain (1 - 3)  dextrose Oral Gel 15 Gram(s) Oral once PRN Blood Glucose LESS THAN 70 milliGRAM(s)/deciliter  guaiFENesin Oral Liquid (Sugar-Free) 200 milliGRAM(s) Oral every 6 hours PRN Cough  ondansetron Injectable 4 milliGRAM(s) IV Push every 8 hours PRN Nausea and/or Vomiting      Allergies    No Known Allergies    Intolerances        REVIEW OF SYSTEMS:  CONSTITUTIONAL: No fever or chills  HEENT:  No headache, no sore throat  RESPIRATORY: No cough, wheezing, or shortness of breath  CARDIOVASCULAR: No chest pain, palpitations  GASTROINTESTINAL: No abd pain, nausea, vomiting, or diarrhea  GENITOURINARY: No dysuria, frequency, or hematuria  NEUROLOGICAL: no focal weakness or dizziness  MUSCULOSKELETAL: no myalgias     Vital Signs Last 24 Hrs  T(C): 36.7 (12 Oct 2022 05:03), Max: 36.9 (11 Oct 2022 20:22)  T(F): 98.1 (12 Oct 2022 05:03), Max: 98.4 (11 Oct 2022 20:22)  HR: 71 (12 Oct 2022 05:03) (71 - 71)  BP: 126/64 (12 Oct 2022 05:03) (119/63 - 142/79)  BP(mean): --  RR: 17 (12 Oct 2022 05:03) (17 - 18)  SpO2: 93% (12 Oct 2022 05:03) (93% - 94%)    Parameters below as of 12 Oct 2022 05:03  Patient On (Oxygen Delivery Method): nasal cannula  O2 Flow (L/min): 2      PHYSICAL EXAM:  GENERAL: NAD  HEENT:  anicteric, moist mucous membranes  CHEST/LUNG:  CTA b/l, no rales, wheezes, or rhonchi  HEART:  RRR, S1, S2  ABDOMEN:  BS+, soft, nontender, nondistended  EXTREMITIES: no edema, cyanosis, or calf tenderness  NERVOUS SYSTEM: answers questions and follows commands appropriately    LABS:                        7.9    2.78  )-----------( 361      ( 12 Oct 2022 07:04 )             26.4     CBC Full  -  ( 12 Oct 2022 07:04 )  WBC Count : 2.78 K/uL  Hemoglobin : 7.9 g/dL  Hematocrit : 26.4 %  Platelet Count - Automated : 361 K/uL  Mean Cell Volume : 103.1 fl  Mean Cell Hemoglobin : 30.9 pg  Mean Cell Hemoglobin Concentration : 29.9 gm/dL  Auto Neutrophil # : 1.18 K/uL  Auto Lymphocyte # : 1.09 K/uL  Auto Monocyte # : 0.41 K/uL  Auto Eosinophil # : 0.02 K/uL  Auto Basophil # : 0.01 K/uL  Auto Neutrophil % : 42.5 %  Auto Lymphocyte % : 39.2 %  Auto Monocyte % : 14.7 %  Auto Eosinophil % : 0.7 %  Auto Basophil % : 0.4 %    12 Oct 2022 07:04    140    |  106    |  18     ----------------------------<  118    4.5     |  29     |  0.91     Ca    7.8        12 Oct 2022 07:04  Mg     2.1       12 Oct 2022 07:04    TPro  6.1    /  Alb  2.3    /  TBili  0.4    /  DBili  x      /  AST  8      /  ALT  12     /  AlkPhos  59     12 Oct 2022 07:04    PT/INR - ( 12 Oct 2022 07:04 )   PT: 51.5 sec;   INR: 4.34 ratio             CAPILLARY BLOOD GLUCOSE      POCT Blood Glucose.: 113 mg/dL (12 Oct 2022 07:15)  POCT Blood Glucose.: 120 mg/dL (11 Oct 2022 21:33)  POCT Blood Glucose.: 117 mg/dL (11 Oct 2022 16:42)  POCT Blood Glucose.: 115 mg/dL (11 Oct 2022 11:38)        Culture - Sputum (collected 10-07-22 @ 16:00)  Source: .Sputum Sputum  Gram Stain (10-08-22 @ 06:22):    Few polymorphonuclear leukocytes per low power field    No Squamous epithelial cells per low power field    Rare Gram positive cocci in pairs per oil power field    Rare Gram Negative Coccobacilli seen per oil power field  Final Report (10-09-22 @ 16:48):    Normal Respiratory Ginger present    Culture - Blood (collected 10-06-22 @ 22:10)  Source: .Blood Blood-Peripheral  Final Report (10-12-22 @ 04:01):    No Growth Final    Culture - Blood (collected 10-06-22 @ 22:00)  Source: .Blood Blood-Peripheral  Final Report (10-12-22 @ 04:01):    No Growth Final    Culture - Urine (collected 10-05-22 @ 15:30)  Source: Clean Catch Clean Catch (Midstream)  Final Report (10-06-22 @ 18:54):    <10,000 CFU/mL Normal Urogenital Ginger    Culture - Blood (collected 10-05-22 @ 13:40)  Source: .Blood Blood-Peripheral  Final Report (10-10-22 @ 19:01):    No Growth Final    Culture - Blood (collected 10-05-22 @ 13:25)  Source: .Blood Blood-Peripheral  Final Report (10-10-22 @ 19:01):    No Growth Final        RADIOLOGY & ADDITIONAL TESTS:    Personally reviewed.     Consultant(s) Notes Reviewed:  [x] YES  [ ] NO

## 2022-10-12 NOTE — PROGRESS NOTE ADULT - SUBJECTIVE AND OBJECTIVE BOX
[facility]  Berhane Maldonado PA-C  [address] [place] [address]  [phone]      INTERVAL HPI/OVERNIGHT EVENTS:  Pt s/e  Reports tolerating regular diet so far  +BM overnight  No new GI complaints    MEDICATIONS  (STANDING):  acetaminophen     Tablet .. 325 milliGRAM(s) Oral once  aMIOdarone    Tablet 100 milliGRAM(s) Oral daily  budesonide  80 MICROgram(s)/formoterol 4.5 MICROgram(s) Inhaler 2 Puff(s) Inhalation two times a day  dextrose 5%. 1000 milliLiter(s) (50 mL/Hr) IV Continuous <Continuous>  dextrose 5%. 1000 milliLiter(s) (100 mL/Hr) IV Continuous <Continuous>  dextrose 50% Injectable 25 Gram(s) IV Push once  dextrose 50% Injectable 25 Gram(s) IV Push once  dextrose 50% Injectable 12.5 Gram(s) IV Push once  diltiazem    Tablet 30 milliGRAM(s) Oral two times a day  diphenhydrAMINE 25 milliGRAM(s) Oral once  gabapentin 400 milliGRAM(s) Oral two times a day  glucagon  Injectable 1 milliGRAM(s) IntraMuscular once  insulin lispro (ADMELOG) corrective regimen sliding scale   SubCutaneous three times a day before meals  insulin lispro (ADMELOG) corrective regimen sliding scale   SubCutaneous at bedtime  montelukast 10 milliGRAM(s) Oral daily  pantoprazole  Injectable 40 milliGRAM(s) IV Push two times a day  simvastatin 10 milliGRAM(s) Oral at bedtime  tiotropium 18 MICROgram(s) Capsule 1 Capsule(s) Inhalation daily  traMADol 50 milliGRAM(s) Oral two times a day    MEDICATIONS  (PRN):  acetaminophen     Tablet .. 650 milliGRAM(s) Oral every 6 hours PRN Temp greater or equal to 38C (100.4F), Mild Pain (1 - 3)  dextrose Oral Gel 15 Gram(s) Oral once PRN Blood Glucose LESS THAN 70 milliGRAM(s)/deciliter  guaiFENesin Oral Liquid (Sugar-Free) 200 milliGRAM(s) Oral every 6 hours PRN Cough  ondansetron Injectable 4 milliGRAM(s) IV Push every 8 hours PRN Nausea and/or Vomiting      Allergies    No Known Allergies      PHYSICAL EXAM:   Vital Signs:  Vital Signs Last 24 Hrs  T(C): 36.7 (12 Oct 2022 05:03), Max: 36.9 (11 Oct 2022 20:22)  T(F): 98.1 (12 Oct 2022 05:03), Max: 98.4 (11 Oct 2022 20:22)  HR: 74 (12 Oct 2022 10:30) (71 - 74)  BP: 126/71 (12 Oct 2022 10:30) (119/63 - 142/79)  BP(mean): --  RR: 17 (12 Oct 2022 05:03) (17 - 18)  SpO2: 92% (12 Oct 2022 10:30) (92% - 94%)    Parameters below as of 12 Oct 2022 10:30  Patient On (Oxygen Delivery Method): nasal cannula      Daily     Daily Weight in k.2 (12 Oct 2022 05:03)    GENERAL:  Appears stated age  ABDOMEN:  Soft, non-tender, non-distended  NEURO:  Alert      LABS:                        7.9    2.78  )-----------( 361      ( 12 Oct 2022 07:04 )             26.4     10-12    140  |  106  |  18  ----------------------------<  118<H>  4.5   |  29  |  0.91    Ca    7.8<L>      12 Oct 2022 07:04  Mg     2.1     10-12    TPro  6.1  /  Alb  2.3<L>  /  TBili  0.4  /  DBili  x   /  AST  8<L>  /  ALT  12  /  AlkPhos  59  10-12    PT/INR - ( 12 Oct 2022 07:04 )   PT: 51.5 sec;   INR: 4.34 ratio

## 2022-10-12 NOTE — PROGRESS NOTE ADULT - PROBLEM SELECTOR PLAN 4
Pt with history of COPD  - Stable  - Trelegy Ellipta therapeutic interchange in hospital  - Singulair 10 mg qd  - Encourage incentive spirometry  - Monitor respiratory status   - RVP neg  - No home Oxygen use  - Pulm (El) consulted, recs appreciated

## 2022-10-12 NOTE — CHART NOTE - NSCHARTNOTEFT_GEN_A_CORE
Called by RN to push metoprolol 2.5mg. Pushed slowly over 2 minutes, patient tolerated without any side effects.
Called by RN, pt fever 102.6. Of note, patient has not had a fever on this admission.      Assessment/Plan: 81-year-old obese female past medical history previously diagnosied with DMII (no longer current), HTN, GERD, COPD not on home O2, pAfib s/p ablation currently on Coumadin, Aplastic Anemia, CKD, HFpEF admitted for pneumonia workup  - Tylenol 650mg PRN for fever  - F/u CXR, lactate, UA, blood culture  - Will continue to monitor, RN to call if any changes
patient seen and evaluated  states she's tolerating her diet without nausea and vomiting  passed flatus and ha a bowel movement  belly is soft, nd, nttp  will sign off   please reconsult for any new issues
Called by RN for Pt c/o emesis. Patient with second episode of emesis. Bilious vomitus along with abdominal pain and feeling of distention. Denies active nausea at this time, states that the vomit just came up sporadically. Pain 3/10.        T(C): 36.8 (10-08-22 @ 12:48), Max: 37.2 (10-07-22 @ 21:36)  HR: 79 (10-08-22 @ 17:49) (78 - 81)  BP: 124/79 (10-08-22 @ 17:49) (117/67 - 124/79)  RR: 18 (10-08-22 @ 12:48) (17 - 20)  SpO2: 93% (10-08-22 @ 12:48) (93% - 95%)  Wt(kg): --    Physical :  Gen- NAD, ncat  Cardio - s+1,s+2, rrr, no murmur  Lung - cta b/l, no wheeze, no rhonchi, no rales   Abdomen- +BS though hypoactive, difficult exam 2/2 body habitus. Generalized tenderness to palpation, negative Stanley. ND, no guarding, no masses  Ext- no edema  Neuro- CN grossly intact, strength and sensation grossly normal  LABS:                        9.1    15.25 )-----------( 302      ( 08 Oct 2022 07:52 )             28.2     10-08    139  |  107  |  35<H>  ----------------------------<  114<H>  4.7   |  24  |  1.60<H>    Ca    9.3      08 Oct 2022 07:52      PT/INR - ( 08 Oct 2022 07:52 )   PT: 31.7 sec;   INR: 2.68 ratio                     Assessment/Plan  81yFemale admitted for aspiration PNA, concerned for possible SBO on X-ray, bilious emesis x 2 today and yesterday, diffuse abdominal pain, mainly RUQ, possible rebound, no guarding.  -Discussed with surgical PA, will f/u recommendations
Called by RN, pt c/o abdominal pain. Patient's abdominal pain is new.   Pt seen and examined at bedside. Pt states she started feeling abdominal pain at approximately 17:00. Rates abdominal pain 9/10. Patient first attributed pain to "having gas" but had flatulence and abdominal pain did not improve. Patient reports pain has since increased in severity since 17:00. Patient reports that she has had "bad reaction to a tylenol cocktail" previously and cannot take ibuprofen due to her irritable bowel syndrome. Patient reports her last BM was yesterday morning.     T(F): 98.5 (10-08-22 @ 01:19), Max: 99.9 (10-07-22 @ 16:10)  HR: 78 (10-08-22 @ 01:19) (72 - 82)  BP: 118/74 (10-08-22 @ 01:19) (100/58 - 120/70)  RR: 19 (10-08-22 @ 01:19) (19 - 22)  SpO2: 94% (10-08-22 @ 01:19) (93% - 94%)    Physical Exam:  Gen: NAD, lying in bed  HEENT: moist mucous membranes, conjunctiva and sclera clear  Cardio: +S1, +S2, RRR  Lungs: CTA B/L, No w/r/r, no increased WOB   Abd: soft, +TTP LLQ, umbilical regions. +BS x 4 quadrants, no rebound/guarding   Ext: No pedal edema, no calf tenderness, pulses intact  Neuro: answers questions appropriately     Assessment/Plan: 81-year-old obese female past medical history previously diagnosied with DMII (no longer current), HTN, GERD, COPD not on home O2, pAfib s/p ablation currently on Coumadin, Aplastic Anemia, CKD, HFpEF admitted for pneumonia, on IV Rocephin and Azithro.   - Dilaudid 0.5mg IVP x1  - Miralax  - F/u abdominal XR  - Will continue to monitor, RN to call if any changes
consult dictated    Impression:    S/P episode of confusion and possible syncope  Hx COPD  Hx Pulmonary emboli - S/P IVC filter - followed by GI bleed, ischemic colitis, shock, and prolonged respiratory failure  PAF  Hx IGG subclass deficiency  Chronic Kidney Disease  Hx HYperlipidemia    Plan:    Cardiac monitoring  Chest CT for evaluation of possible right lower lobe pulmonary infiltrate  Trelegy inhaler  Albuterol PRN  Anticoagulation with Heparin/Coumadin  Albuterol PrN  Continue Simvastatin, Amiodarone, Flonase, Singulair

## 2022-10-12 NOTE — PROGRESS NOTE ADULT - PROBLEM SELECTOR PLAN 6
Pt with history of pAfib s/p ablation on Coumadin   - Daily INR checks  - Hold Coumadin. INR supratherapeutic today  - Home regimen: Coumadin 4mg Tuesday/Wednesday/Thursday/Sunday + Coumadin 6mg Monday/Friday/Saturday  - Continue Cardizem and Amiodarone 100mg qd

## 2022-10-12 NOTE — PROGRESS NOTE ADULT - SUBJECTIVE AND OBJECTIVE BOX
Patient is a 81y old  Female who presents with a chief complaint of Pneumonia (07 Oct 2022 11:21)    Patient seen in follow up for CKD.        PAST MEDICAL HISTORY:  COPD (chronic obstructive pulmonary disease)    DM (diabetes mellitus)    Pulmonary fibrosis    PAF (paroxysmal atrial fibrillation)    Hiatal hernia    GIB (gastrointestinal bleeding)    Pericarditis    Shoulder fracture, right    Hematoma      MEDICATIONS  (STANDING):  acetaminophen     Tablet .. 325 milliGRAM(s) Oral once  aMIOdarone    Tablet 100 milliGRAM(s) Oral daily  budesonide  80 MICROgram(s)/formoterol 4.5 MICROgram(s) Inhaler 2 Puff(s) Inhalation two times a day  dextrose 5%. 1000 milliLiter(s) (50 mL/Hr) IV Continuous <Continuous>  dextrose 5%. 1000 milliLiter(s) (75 mL/Hr) IV Continuous <Continuous>  dextrose 5%. 1000 milliLiter(s) (100 mL/Hr) IV Continuous <Continuous>  dextrose 50% Injectable 25 Gram(s) IV Push once  dextrose 50% Injectable 25 Gram(s) IV Push once  dextrose 50% Injectable 12.5 Gram(s) IV Push once  diltiazem    Tablet 30 milliGRAM(s) Oral two times a day  diphenhydrAMINE 25 milliGRAM(s) Oral once  gabapentin 400 milliGRAM(s) Oral two times a day  glucagon  Injectable 1 milliGRAM(s) IntraMuscular once  insulin lispro (ADMELOG) corrective regimen sliding scale   SubCutaneous three times a day before meals  insulin lispro (ADMELOG) corrective regimen sliding scale   SubCutaneous at bedtime  montelukast 10 milliGRAM(s) Oral daily  pantoprazole  Injectable 40 milliGRAM(s) IV Push two times a day  simvastatin 10 milliGRAM(s) Oral at bedtime  tiotropium 18 MICROgram(s) Capsule 1 Capsule(s) Inhalation daily  traMADol 50 milliGRAM(s) Oral two times a day    MEDICATIONS  (PRN):  acetaminophen     Tablet .. 650 milliGRAM(s) Oral every 6 hours PRN Temp greater or equal to 38C (100.4F), Mild Pain (1 - 3)  dextrose Oral Gel 15 Gram(s) Oral once PRN Blood Glucose LESS THAN 70 milliGRAM(s)/deciliter  guaiFENesin Oral Liquid (Sugar-Free) 200 milliGRAM(s) Oral every 6 hours PRN Cough  ondansetron Injectable 4 milliGRAM(s) IV Push every 8 hours PRN Nausea and/or Vomiting    T(C): 36.7 (10-12-22 @ 05:03), Max: 37.2 (10-11-22 @ 04:12)  HR: 71 (10-12-22 @ 05:03) (70 - 74)  BP: 126/64 (10-12-22 @ 05:03) (119/63 - 145/75)  RR: 17 (10-12-22 @ 05:03)  SpO2: 93% (10-12-22 @ 05:03)  Wt(kg): --  I&O's Detail    11 Oct 2022 07:01  -  12 Oct 2022 07:00  --------------------------------------------------------  IN:    dextrose 5%: 900 mL  Total IN: 900 mL    OUT:    Voided (mL): 700 mL  Total OUT: 700 mL    Total NET: 200 mL                  PHYSICAL EXAM:  General: no distress  Respiratory: b/l air entry  Cardiovascular: S1 S2  Gastrointestinal: soft  Extremities:  edema                          LABORATORY:                        7.9    2.78  )-----------( 361      ( 12 Oct 2022 07:04 )             26.4     10-12    140  |  106  |  18  ----------------------------<  118<H>  4.5   |  29  |  0.91    Ca    7.8<L>      12 Oct 2022 07:04  Mg     2.1     10-12    TPro  6.1  /  Alb  2.3<L>  /  TBili  0.4  /  DBili  x   /  AST  8<L>  /  ALT  12  /  AlkPhos  59  10-12    Sodium, Serum: 140 mmol/L (10-12 @ 07:04)  Sodium, Serum: 147 mmol/L (10-11 @ 06:30)    Potassium, Serum: 4.5 mmol/L (10-12 @ 07:04)  Potassium, Serum: 4.3 mmol/L (10-11 @ 06:30)    Hemoglobin: 7.9 g/dL (10-12 @ 07:04)  Hemoglobin: 8.3 g/dL (10-11 @ 06:30)  Hemoglobin: 8.0 g/dL (10-10 @ 07:30)    Creatinine, Serum 0.91 (10-12 @ 07:04)  Creatinine, Serum 0.98 (10-11 @ 06:30)  Creatinine, Serum 1.10 (10-10 @ 07:30)        LIVER FUNCTIONS - ( 12 Oct 2022 07:04 )  Alb: 2.3 g/dL / Pro: 6.1 g/dL / ALK PHOS: 59 U/L / ALT: 12 U/L / AST: 8 U/L / GGT: x

## 2022-10-12 NOTE — PROGRESS NOTE ADULT - SUBJECTIVE AND OBJECTIVE BOX
[INTERVAL HX: ]  Patient seen and examined;  Chart reviewed and events noted;   no CP, no SOB  NGT out.   Discussed PRBC transfusion, as Hgb declined  c/o weakness, cant walk well      [HEALTH ISSUES]  HEALTH ISSUES - PROBLEM Dx:  KIMBERLY (acute kidney injury)  COPD, mild  Paroxysmal atrial fibrillation  Aplastic anemia  Diabetes mellitus  Weakness  Sepsis  Need for prophylactic measure  Chronic pain  Hypertension  Constipation  GERD (gastroesophageal reflux disease)  Anemia secondary to renal failure  Other myelodysplastic syndromes  Abdominal pain  Small bowel obstruction    [MEDICATIONS]  MEDICATIONS  (STANDING):  acetaminophen     Tablet .. 325 milliGRAM(s) Oral once  aMIOdarone    Tablet 100 milliGRAM(s) Oral daily  budesonide  80 MICROgram(s)/formoterol 4.5 MICROgram(s) Inhaler 2 Puff(s) Inhalation two times a day  dextrose 5%. 1000 milliLiter(s) (50 mL/Hr) IV Continuous <Continuous>  dextrose 5%. 1000 milliLiter(s) (100 mL/Hr) IV Continuous <Continuous>  dextrose 50% Injectable 25 Gram(s) IV Push once  dextrose 50% Injectable 25 Gram(s) IV Push once  dextrose 50% Injectable 12.5 Gram(s) IV Push once  diltiazem    Tablet 30 milliGRAM(s) Oral two times a day  diphenhydrAMINE 25 milliGRAM(s) Oral once  gabapentin 400 milliGRAM(s) Oral two times a day  glucagon  Injectable 1 milliGRAM(s) IntraMuscular once  insulin lispro (ADMELOG) corrective regimen sliding scale   SubCutaneous three times a day before meals  insulin lispro (ADMELOG) corrective regimen sliding scale   SubCutaneous at bedtime  montelukast 10 milliGRAM(s) Oral daily  pantoprazole  Injectable 40 milliGRAM(s) IV Push two times a day  simvastatin 10 milliGRAM(s) Oral at bedtime  tiotropium 18 MICROgram(s) Capsule 1 Capsule(s) Inhalation daily  traMADol 50 milliGRAM(s) Oral two times a day    MEDICATIONS  (PRN):  acetaminophen     Tablet .. 650 milliGRAM(s) Oral every 6 hours PRN Temp greater or equal to 38C (100.4F), Mild Pain (1 - 3)  dextrose Oral Gel 15 Gram(s) Oral once PRN Blood Glucose LESS THAN 70 milliGRAM(s)/deciliter  guaiFENesin Oral Liquid (Sugar-Free) 200 milliGRAM(s) Oral every 6 hours PRN Cough  ondansetron Injectable 4 milliGRAM(s) IV Push every 8 hours PRN Nausea and/or Vomiting    [VITALS]  Vital Signs Last 24 Hrs  T(C): 36.7 (12 Oct 2022 05:03), Max: 36.9 (11 Oct 2022 20:22)  T(F): 98.1 (12 Oct 2022 05:03), Max: 98.4 (11 Oct 2022 20:22)  HR: 74 (12 Oct 2022 10:30) (71 - 74)  BP: 126/71 (12 Oct 2022 10:30) (119/63 - 142/79)  BP(mean): --  RR: 17 (12 Oct 2022 05:03) (17 - 18)  SpO2: 92% (12 Oct 2022 10:30) (92% - 94%)    Parameters below as of 12 Oct 2022 10:30  Patient On (Oxygen Delivery Method): nasal cannula      [WT/HT]  Daily     Daily Weight in k.2 (12 Oct 2022 05:03)  [VENT]      [PHYSICAL EXAM]  General: WN,  WD adult in NAD.  HEENT:  anicteric sclera, pink conjunctivae,   Neck: supple, no masses.  CV: normal S1S2, no murmur, no rubs, no gallops.  Lungs: clear to auscultation, no wheezes, no rales, no rhonchi.  Abdomen: soft, non-tender, non-distended, no hepatosplenomegaly, normal BS, no guarding.  Ext: no clubbing, no cyanosis, no edema.  Skin: no rashes,  no petechiae, no venous stasis changes.  Neuro: alert and oriented X 3, no focal motor deficits.  LN: no SC LILLIAN.    [LABS:]                        7.9    2.78  )-----------( 361      ( 12 Oct 2022 07:04 )             26.4     10-12    140  |  106  |  18  ----------------------------<  118<H>  4.5   |  29  |  0.91    Ca    7.8<L>      12 Oct 2022 07:04  Mg     2.1     10-12    TPro  6.1  /  Alb  2.3<L>  /  TBili  0.4  /  DBili  x   /  AST  8<L>  /  ALT  12  /  AlkPhos  59  10-12  PT/INR - ( 12 Oct 2022 07:04 )   PT: 51.5 sec;   INR: 4.34 ratio      Iron - Total Binding Capacity.: 230 ug/dL [220 - 430] (22 @ 14:05)  Ferritin, Serum: 1349 ng/mL *H* [15 - 150] (22 @ 14:05)    COVID-19 PCR: NotDetec (05 Oct 2022 13:40)  SARS-CoV-2: NotDetec (05 Oct 2022 13:40)  COVID-19 PCR: NotDetec (2022 07:32)  SARS-CoV-2: NotDetec (2022 12:17)      [RADIOLOGY STUDIES:]

## 2022-10-12 NOTE — PROGRESS NOTE ADULT - PROBLEM SELECTOR PLAN 3
Pt with history of aplastic anemia, follows Dr. Parker outpatient  - Procrit 10 injection weekly with heme  - folic acid 1mg qd  - s/p 1 unit pRBC on 10/7 and 2 unit pRBC 10/12  - monitor blood counts daily  - transfuse if Hg <7.0  - Heme (Dr. Parker) consulted, recs appreciated: hold MIRIAM while hospitalized and will resume as an outpatient

## 2022-10-12 NOTE — PROGRESS NOTE ADULT - ASSESSMENT
IMPRESSION  Patient with anemia multifactorial in anemia secondary to renal insufficiency, myelodysplasia now complicated by  admission with pneumonia  patient is MIRIAM dependent and does require occasional transfusion  s/p 1 unit PRBC with improvement in hgb 9.1  developed abd pain NV last night and now being treated for SBO  s/p NGT for decompression. fell out 10/11.   Had BM and passing flatus since.     Elevated ferritin with likely iron overload    Hgb declined.   c/o fatigue, inability to walk  Likely multifactorial including deconditioning. .     RECOMMEND:  1.  follow CBC  2.  to hold MIRIAM while hospitalized and will resume as an outpatient  3.  continue present abx  4.  continue surgical management  5.  s/p PRBC transfusion at admission. May need transfusion and follow CBC, but would prefer conservative strategy with regards to transfusion  Plan for additional 1U PRBC today, ABO-T/S  ordered.     6.  further hematologic recommendations pending clinical course  discussed with patient   rehab OT/PT eval  DC planning when stable.

## 2022-10-12 NOTE — PHYSICAL THERAPY INITIAL EVALUATION ADULT - TRANSFER TRAINING, PT EVAL
Patient will transfer from all surfaces independently in 2 weeks in order to increase mobility at home
(2) potential problem

## 2022-10-12 NOTE — PHYSICAL THERAPY INITIAL EVALUATION ADULT - LEVEL OF CONSCIOUSNESS, REHAB EVAL
1- Stop Flovent 44  2- Start Flovent 110 mcg 2 puffs twice a day with mask/spacer  3- Albuterol as needed per the asthma action plan, but also as needed at school  4- Continue speech therapy and GT feeds  5- Return in 3-4 months   
alert

## 2022-10-12 NOTE — PROGRESS NOTE ADULT - PROBLEM SELECTOR PLAN 2
Weakness due to community acquired pnemonia.  - Confusion resolved after her fever resolved.   - s/p Rocephin 1 g IV daily with azithromycin 500 mg IV daily. Monitor off antibiotics   - CXR: 1. Ill-defined increased density in the right lower lobe may be due to the overlying breast shadow however follow-up is recommended to help exclude an evolving right lower lobe infiltrate in this region. 2. No other significant findings.   - CT chest: Right lower lobe consolidation compatible with pneumonia. Recommend CT chest follow-up to ensure clearing. Small right pleural effusion.  - Had IVIG last month as per patient; no urgent need while acutely infected   - Procal 0.69, f/u sputum culture   - MRSA PCR negative  - Heme (Dr. Parker) consulted, recs appreciated: hold MIRIAM while hospitalized and will resume as an outpatient  - Pt with history of with history of Pulmonary emboli - S/P IVC filter  - ID (Dr. Carter) consulted, recs appreciated  - Dao Gallegos consulted, recs appreciated

## 2022-10-12 NOTE — PHYSICAL THERAPY INITIAL EVALUATION ADULT - PERTINENT HX OF CURRENT PROBLEM, REHAB EVAL
81-year-old obese female past medical history previously diagnosied with DMII (no longer current), HTN, GERD, COPD not on home O2 (with history of Pulmonary emboli - S/P IVC filter - followed by GI bleed, ischemic colitis, shock, and prolonged respiratory failure), pAfib s/p ablation currently on Coumadin, Aplastic Anemia, CKD, presents to the ER with complaints of generalized weakness this morning.

## 2022-10-12 NOTE — PHYSICAL THERAPY INITIAL EVALUATION ADULT - GENERAL OBSERVATIONS, REHAB EVAL
Patient received supine in bed, cooperative with physical therapy, +IV, +O2 via nasal canula, external catheter

## 2022-10-12 NOTE — PROGRESS NOTE ADULT - PROBLEM SELECTOR PLAN 5
Resolved  - Pt with CKD (G3a), baseline GFR ~42, Cr 1.2-1.3 at baseline per chart review  - Monitor BMP  - Avoid nephrotoxic medications  - Monitor renal indices

## 2022-10-12 NOTE — PHYSICAL THERAPY INITIAL EVALUATION ADULT - ADDITIONAL COMMENTS
Patient lives in private home, +ramp to enter, has 1 step to sunken living room.  Patient was independent in all ADLs and ambulates independently with RW.  Patient she needed help at times with bathing which  provided.

## 2022-10-12 NOTE — PROGRESS NOTE ADULT - SUBJECTIVE AND OBJECTIVE BOX
[facility] -- Sai Valle Pannella, Patel, [facility]  Office # [phone]      Follow Up:    Afib PNA  Subjective/Observations:   No events overnight resting comfortably in bed.  No complaints of chest pain, dyspnea, or palpitations reported. No signs of orthopnea or PND.  remains on nasal cannula       REVIEW OF SYSTEMS: All other review of systems is negative unless indicated above    PAST MEDICAL & SURGICAL HISTORY:  COPD (chronic obstructive pulmonary disease)      DM (diabetes mellitus)      PAF (paroxysmal atrial fibrillation)  s/p ablation      Hiatal hernia      GIB (gastrointestinal bleeding)      S/P hysterectomy      S/P foot surgery      S/P knee surgery      After cataract  bilateral eye cataract surgically removed      Closed right hip fracture  rigth hip ORIF 2016      S/P IVC filter  2016      S/P carpal tunnel release  Right  &amp; 17          MEDICATIONS  (STANDING):  aMIOdarone    Tablet 100 milliGRAM(s) Oral daily  budesonide  80 MICROgram(s)/formoterol 4.5 MICROgram(s) Inhaler 2 Puff(s) Inhalation two times a day  dextrose 5%. 1000 milliLiter(s) (75 mL/Hr) IV Continuous <Continuous>  dextrose 5%. 1000 milliLiter(s) (50 mL/Hr) IV Continuous <Continuous>  dextrose 5%. 1000 milliLiter(s) (100 mL/Hr) IV Continuous <Continuous>  dextrose 50% Injectable 25 Gram(s) IV Push once  dextrose 50% Injectable 25 Gram(s) IV Push once  dextrose 50% Injectable 12.5 Gram(s) IV Push once  diltiazem    Tablet 30 milliGRAM(s) Oral two times a day  gabapentin 400 milliGRAM(s) Oral two times a day  glucagon  Injectable 1 milliGRAM(s) IntraMuscular once  insulin lispro (ADMELOG) corrective regimen sliding scale   SubCutaneous three times a day before meals  insulin lispro (ADMELOG) corrective regimen sliding scale   SubCutaneous at bedtime  montelukast 10 milliGRAM(s) Oral daily  pantoprazole  Injectable 40 milliGRAM(s) IV Push two times a day  simvastatin 10 milliGRAM(s) Oral at bedtime  tiotropium 18 MICROgram(s) Capsule 1 Capsule(s) Inhalation daily  traMADol 50 milliGRAM(s) Oral two times a day    MEDICATIONS  (PRN):  acetaminophen     Tablet .. 650 milliGRAM(s) Oral every 6 hours PRN Temp greater or equal to 38C (100.4F), Mild Pain (1 - 3)  dextrose Oral Gel 15 Gram(s) Oral once PRN Blood Glucose LESS THAN 70 milliGRAM(s)/deciliter  guaiFENesin Oral Liquid (Sugar-Free) 200 milliGRAM(s) Oral every 6 hours PRN Cough  ondansetron Injectable 4 milliGRAM(s) IV Push every 8 hours PRN Nausea and/or Vomiting      Allergies    No Known Allergies    Intolerances        Vital Signs Last 24 Hrs  T(C): 36.7 (12 Oct 2022 05:03), Max: 36.9 (11 Oct 2022 20:22)  T(F): 98.1 (12 Oct 2022 05:03), Max: 98.4 (11 Oct 2022 20:22)  HR: 71 (12 Oct 2022 05:03) (71 - 71)  BP: 126/64 (12 Oct 2022 05:03) (119/63 - 142/79)  BP(mean): --  RR: 17 (12 Oct 2022 05:03) (17 - 18)  SpO2: 93% (12 Oct 2022 05:03) (93% - 94%)    Parameters below as of 12 Oct 2022 05:03  Patient On (Oxygen Delivery Method): nasal cannula  O2 Flow (L/min): 2      I&O's Summary    11 Oct 2022 07:01  -  12 Oct 2022 07:00  --------------------------------------------------------  IN: 900 mL / OUT: 700 mL / NET: 200 mL          PHYSICAL EXAM:  TELE: Sr  Constitutional: NAD, awake and alert, obese   HEENT: Moist Mucous Membranes, Anicteric  Pulmonary: Non-labored, breath sounds are clear bilaterally, No wheezing, crackles or rhonchi  Cardiovascular: Regular, S1 and S2 nl, No murmurs, rubs, gallops or clicks  Gastrointestinal: Bowel Sounds present, soft, nontender.   Lymph: No lymphadenopathy. No peripheral edema.  Skin: No visible rashes or ulcers.  Psych:  Mood & affect appropriate    LABS: All Labs Reviewed:                        7.9    2.78  )-----------( 361      ( 12 Oct 2022 07:04 )             26.4                         8.3    4.07  )-----------( 369      ( 11 Oct 2022 06:30 )             27.6                         8.0    7.12  )-----------( 354      ( 10 Oct 2022 07:30 )             26.0     12 Oct 2022 07:04    140    |  106    |  18     ----------------------------<  118    4.5     |  29     |  0.91   11 Oct 2022 06:30    147    |  113    |  26     ----------------------------<  118    4.3     |  29     |  0.98   10 Oct 2022 07:30    146    |  113    |  33     ----------------------------<  92     4.6     |  26     |  1.10     Ca    7.8        12 Oct 2022 07:04  Ca    8.3        11 Oct 2022 06:30  Ca    8.7        10 Oct 2022 07:30  Mg     2.1       12 Oct 2022 07:04    TPro  6.1    /  Alb  2.3    /  TBili  0.4    /  DBili  x      /  AST  8      /  ALT  12     /  AlkPhos  59     12 Oct 2022 07:04  TPro  6.5    /  Alb  2.5    /  TBili  0.4    /  DBili  x      /  AST  8      /  ALT  13     /  AlkPhos  68     11 Oct 2022 06:30  TPro  6.6    /  Alb  2.5    /  TBili  0.4    /  DBili  x      /  AST  10     /  ALT  14     /  AlkPhos  69     10 Oct 2022 07:30    PT/INR - ( 12 Oct 2022 07:04 )   PT: 51.5 sec;   INR: 4.34 ratio         EC Lead ECG:   Ventricular Rate 79 BPM    Atrial Rate 79 BPM    P-R Interval 136 ms    QRS Duration 78 ms    Q-T Interval 384 ms    QTC Calculation(Bazett) 440 ms    P Axis 85 degrees    R Axis -31 degrees    T Axis 72 degrees    Diagnosis Line Normal sinus rhythm  Left axis deviation  Confirmed by DAVID MELGOZA ([id number]) on 10/5/2022 5:32:59 PM (10-05-22 @ 13:06)       EXAM:  ECHO TTE W/O CON COMP W/DOPPLR           PROCEDURE DATE:  2016      INTERPRETATION:  Ordering Physician: LIA MOLINA    Indication: Syncope    Study Quality: Technically difficult and limited     Height: 167 cm  Weight: 91 kg  BSA: 2.0 sq m  Blood Pressure: 104/70    MEASUREMENTS  IVS: 1.4cm  PWT: 1.4cm  LA: 4.4cm  AO: 3.6cm  LVIDd: 4.6cm  LVIDs: 2.4cm    FINDINGS  Left Ventricle: The endocardium is not well-visualized. Grossly, there   appears to be normal left ventricular systolic function.  Aortic Valve: Calcified trileaflet aortic valve with normal opening. No   significant aortic insufficiency.  Mitral Valve: Mitral annular calcification and calcified mitral valve   leaflets. Mild mitral insufficiency.  Tricuspid Valve: Not well visualized. Grossly normal. Mild tricuspid   insufficiency.  Pulmonic Valve: Not well visualized. Mild pulmonic insufficiency.  Left Atrium: Enlarged  Right Ventricle: Normal right ventricular size and systolic function.  Right Atrium: Enlarged  Diastolic function: Grade 1 diastolic dysfunction.

## 2022-10-12 NOTE — PROGRESS NOTE ADULT - SUBJECTIVE AND OBJECTIVE BOX
Patient is a 81y old  Female who presents with a chief complaint of Pneumonia (12 Oct 2022 12:06)      INTERVAL HPI/OVERNIGHT EVENTS:    feeling better. complains of cough. NG tube was removed 36 hours ago. Tolerating diet.     MEDICATIONS  (STANDING):  aMIOdarone    Tablet 100 milliGRAM(s) Oral daily  budesonide  80 MICROgram(s)/formoterol 4.5 MICROgram(s) Inhaler 2 Puff(s) Inhalation two times a day  dextrose 5%. 1000 milliLiter(s) (50 mL/Hr) IV Continuous <Continuous>  dextrose 5%. 1000 milliLiter(s) (100 mL/Hr) IV Continuous <Continuous>  dextrose 50% Injectable 25 Gram(s) IV Push once  dextrose 50% Injectable 12.5 Gram(s) IV Push once  dextrose 50% Injectable 25 Gram(s) IV Push once  diltiazem    Tablet 30 milliGRAM(s) Oral two times a day  gabapentin 400 milliGRAM(s) Oral two times a day  glucagon  Injectable 1 milliGRAM(s) IntraMuscular once  insulin lispro (ADMELOG) corrective regimen sliding scale   SubCutaneous three times a day before meals  insulin lispro (ADMELOG) corrective regimen sliding scale   SubCutaneous at bedtime  montelukast 10 milliGRAM(s) Oral daily  pantoprazole  Injectable 40 milliGRAM(s) IV Push two times a day  simvastatin 10 milliGRAM(s) Oral at bedtime  tiotropium 18 MICROgram(s) Capsule 1 Capsule(s) Inhalation daily  traMADol 50 milliGRAM(s) Oral two times a day      MEDICATIONS  (PRN):  acetaminophen     Tablet .. 650 milliGRAM(s) Oral every 6 hours PRN Temp greater or equal to 38C (100.4F), Mild Pain (1 - 3)  dextrose Oral Gel 15 Gram(s) Oral once PRN Blood Glucose LESS THAN 70 milliGRAM(s)/deciliter  guaiFENesin Oral Liquid (Sugar-Free) 200 milliGRAM(s) Oral every 6 hours PRN Cough  ondansetron Injectable 4 milliGRAM(s) IV Push every 8 hours PRN Nausea and/or Vomiting      Allergies    No Known Allergies    Intolerances        PAST MEDICAL & SURGICAL HISTORY:  COPD (chronic obstructive pulmonary disease)      DM (diabetes mellitus)      PAF (paroxysmal atrial fibrillation)  s/p ablation      Hiatal hernia      GIB (gastrointestinal bleeding)      S/P hysterectomy      S/P foot surgery      S/P knee surgery      After cataract  bilateral eye cataract surgically removed      Closed right hip fracture  rigth hip ORIF july 19 2016      S/P IVC filter  nov 2016      S/P carpal tunnel release  Right 2008 &amp; 6/27/17          Vital Signs Last 24 Hrs  T(C): 36.6 (12 Oct 2022 20:25), Max: 36.8 (12 Oct 2022 17:37)  T(F): 97.9 (12 Oct 2022 20:25), Max: 98.3 (12 Oct 2022 17:37)  HR: 63 (12 Oct 2022 20:25) (63 - 76)  BP: 132/70 (12 Oct 2022 20:25) (111/66 - 132/70)  BP(mean): --  RR: 18 (12 Oct 2022 20:25) (16 - 19)  SpO2: 94% (12 Oct 2022 20:25) (90% - 95%)    Parameters below as of 12 Oct 2022 20:25  Patient On (Oxygen Delivery Method): nasal cannula  O2 Flow (L/min): 1      PHYSICAL EXAMINATION:    GENERAL: The patient is awake and alert in no apparent distress.     HEENT: Head is normocephalic and atraumatic    NECK: no JVD    LUNGS: diminished air entry bilateral    HEART: Irregular rate and rhythm without murmur.    ABDOMEN: Soft, nontender, and nondistended.      EXTREMITIES: Without any cyanosis, clubbing, rash, lesions or edema.    NEUROLOGIC: Grossly intact.    SKIN: No ulceration or induration present.      LABS:                        7.9    2.78  )-----------( 361      ( 12 Oct 2022 07:04 )             26.4     10-12    140  |  106  |  18  ----------------------------<  118<H>  4.5   |  29  |  0.91    Ca    7.8<L>      12 Oct 2022 07:04  Mg     2.1     10-12    TPro  6.1  /  Alb  2.3<L>  /  TBili  0.4  /  DBili  x   /  AST  8<L>  /  ALT  12  /  AlkPhos  59  10-12    PT/INR - ( 12 Oct 2022 07:04 )   PT: 51.5 sec;   INR: 4.34 ratio                 Assessment:    Resolving Pneumonia  Acute Hypoxic respiratory failure  COPD  Resolved small bowel obstruction  Atrial Fibrillation  Myelodysplastic syndrome  Hx Pulmonary emboli/DVT    Plan:    Symbicort + Spiriva inhalers  Incentive Spirometry  Transfusion as per hematology  Resume Coumadin for anticoagulation  Sequential compression devices  Monitor pulse oximetry  Will check pulse ox on room air prior to discharge with ambulation

## 2022-10-12 NOTE — PROGRESS NOTE ADULT - ASSESSMENT
Prerenal azotemia, CKD 3a  Pneumonia  Diabetes  Hypertension  h/o CHF  Anemia      Improved and stable renal indices. Sodium levels better. Will d/c IVF. To  continue current meds. Monitor blood sugar levels. Insulin coverage as needed. Dietary restriction.   Monitor BP trend. Titrate BP meds as needed. Will follow electrolytes and renal function trend. Hematology / Surgery follow up.  Prerenal azotemia, CKD 3a  Pneumonia  Diabetes  Hypertension  h/o CHF  Anemia      Improved and stable renal indices. Sodium levels better. Will d/c IVF. To  continue current meds.   Monitor blood sugar levels. Insulin coverage as needed. Dietary restriction. Monitor h/h trend. Transfuse PRBC's PRN.   Monitor BP trend. Titrate BP meds as needed. Will follow electrolytes and renal function trend. Hematology / Surgery follow up.

## 2022-10-12 NOTE — PROGRESS NOTE ADULT - ASSESSMENT
81-year-old obese female past medical history previously diagnosed with DMII (no longer current), HTN, GERD, COPD not on home O2, pAfib s/p ablation currently on Coumadin, Aplastic Anemia, CKD, HFpEF admitted for pneumonia, course c/b pneumonia     HTN, PAFIB, Heart Failure   - Known A fib, tele with SR 60-70s, no events, can dc tele   - Metoprolol recently d/c' d outpatient by Dr. Stout secondary to episodes of dizziness   - Continue home amiodarone 100 mg PO qd and Cardizem 30 mg BID   -resume coumadin when cleared by surgical team   - No meaningful signs of volume overload on exam   - Echo (4/2021) showed EF 57%   - Strict I/Os, daily weights.  -SBO now tolerating diet fu surgery recs     - Monitor and replete lytes, keep K>4, Mg>2.  - Will continue to follow.  Amanda Ruiz FNP-C  Cardiology NP  [id number] [phone]

## 2022-10-12 NOTE — PROGRESS NOTE ADULT - ASSESSMENT
SBO    diet as tolerated  IVF  NGT out  surgical eval noted  conservative management for now  advance diet per surgery team  no GI objection to begin d/c plan once tolerating diet    I reviewed the overnight course of events on the unit, re-confirming the patient history. I discussed the care with the patient and their family  Differential diagnosis and plan of care discussed with patient after the evaluation  35 minutes spent on total encounter of which more than fifty percent of the encounter was spent counseling and/or coordinating care by the attending physician.  Advanced care planning was discussed with patient and family.  Advanced care planning forms were reviewed and discussed.  Risks, benefits and alternatives of gastroenterologic procedures were discussed in detail and all questions were answered.

## 2022-10-13 ENCOUNTER — TRANSCRIPTION ENCOUNTER (OUTPATIENT)
Age: 82
End: 2022-10-13

## 2022-10-13 ENCOUNTER — APPOINTMENT (OUTPATIENT)
Dept: CARDIOLOGY | Facility: CLINIC | Age: 82
End: 2022-10-13

## 2022-10-13 DIAGNOSIS — J18.9 PNEUMONIA, UNSPECIFIED ORGANISM: ICD-10-CM

## 2022-10-13 DIAGNOSIS — K56.609 UNSPECIFIED INTESTINAL OBSTRUCTION, UNSPECIFIED AS TO PARTIAL VERSUS COMPLETE OBSTRUCTION: ICD-10-CM

## 2022-10-13 DIAGNOSIS — Z29.9 ENCOUNTER FOR PROPHYLACTIC MEASURES, UNSPECIFIED: ICD-10-CM

## 2022-10-13 LAB
ALBUMIN SERPL ELPH-MCNC: 2.4 G/DL — LOW (ref 3.3–5)
ALP SERPL-CCNC: 59 U/L — SIGNIFICANT CHANGE UP (ref 40–120)
ALT FLD-CCNC: 13 U/L — SIGNIFICANT CHANGE UP (ref 12–78)
ANION GAP SERPL CALC-SCNC: 5 MMOL/L — SIGNIFICANT CHANGE UP (ref 5–17)
AST SERPL-CCNC: 11 U/L — LOW (ref 15–37)
BASOPHILS # BLD AUTO: 0.02 K/UL — SIGNIFICANT CHANGE UP (ref 0–0.2)
BASOPHILS NFR BLD AUTO: 0.7 % — SIGNIFICANT CHANGE UP (ref 0–2)
BILIRUB SERPL-MCNC: 0.7 MG/DL — SIGNIFICANT CHANGE UP (ref 0.2–1.2)
BUN SERPL-MCNC: 18 MG/DL — SIGNIFICANT CHANGE UP (ref 7–23)
CALCIUM SERPL-MCNC: 8.8 MG/DL — SIGNIFICANT CHANGE UP (ref 8.5–10.1)
CHLORIDE SERPL-SCNC: 106 MMOL/L — SIGNIFICANT CHANGE UP (ref 96–108)
CO2 SERPL-SCNC: 30 MMOL/L — SIGNIFICANT CHANGE UP (ref 22–31)
CREAT SERPL-MCNC: 0.9 MG/DL — SIGNIFICANT CHANGE UP (ref 0.5–1.3)
EGFR: 65 ML/MIN/1.73M2 — SIGNIFICANT CHANGE UP
EOSINOPHIL # BLD AUTO: 0.01 K/UL — SIGNIFICANT CHANGE UP (ref 0–0.5)
EOSINOPHIL NFR BLD AUTO: 0.3 % — SIGNIFICANT CHANGE UP (ref 0–6)
GLUCOSE SERPL-MCNC: 101 MG/DL — HIGH (ref 70–99)
HCT VFR BLD CALC: 31 % — LOW (ref 34.5–45)
HGB BLD-MCNC: 9.8 G/DL — LOW (ref 11.5–15.5)
IMM GRANULOCYTES NFR BLD AUTO: 1 % — HIGH (ref 0–0.9)
INR BLD: 2.75 RATIO — HIGH (ref 0.88–1.16)
LYMPHOCYTES # BLD AUTO: 1.32 K/UL — SIGNIFICANT CHANGE UP (ref 1–3.3)
LYMPHOCYTES # BLD AUTO: 45.8 % — HIGH (ref 13–44)
MCHC RBC-ENTMCNC: 30.2 PG — SIGNIFICANT CHANGE UP (ref 27–34)
MCHC RBC-ENTMCNC: 31.6 GM/DL — LOW (ref 32–36)
MCV RBC AUTO: 95.4 FL — SIGNIFICANT CHANGE UP (ref 80–100)
MONOCYTES # BLD AUTO: 0.45 K/UL — SIGNIFICANT CHANGE UP (ref 0–0.9)
MONOCYTES NFR BLD AUTO: 15.6 % — HIGH (ref 2–14)
NEUTROPHILS # BLD AUTO: 1.05 K/UL — LOW (ref 1.8–7.4)
NEUTROPHILS NFR BLD AUTO: 36.6 % — LOW (ref 43–77)
NRBC # BLD: 0 /100 WBCS — SIGNIFICANT CHANGE UP (ref 0–0)
PLATELET # BLD AUTO: 357 K/UL — SIGNIFICANT CHANGE UP (ref 150–400)
POTASSIUM SERPL-MCNC: 4.4 MMOL/L — SIGNIFICANT CHANGE UP (ref 3.5–5.3)
POTASSIUM SERPL-SCNC: 4.4 MMOL/L — SIGNIFICANT CHANGE UP (ref 3.5–5.3)
PROT SERPL-MCNC: 6.1 G/DL — SIGNIFICANT CHANGE UP (ref 6–8.3)
PROTHROM AB SERPL-ACNC: 32.5 SEC — HIGH (ref 10.5–13.4)
RBC # BLD: 3.25 M/UL — LOW (ref 3.8–5.2)
RBC # FLD: 23.5 % — HIGH (ref 10.3–14.5)
SODIUM SERPL-SCNC: 141 MMOL/L — SIGNIFICANT CHANGE UP (ref 135–145)
WBC # BLD: 2.88 K/UL — LOW (ref 3.8–10.5)
WBC # FLD AUTO: 2.88 K/UL — LOW (ref 3.8–10.5)

## 2022-10-13 PROCEDURE — 99233 SBSQ HOSP IP/OBS HIGH 50: CPT

## 2022-10-13 PROCEDURE — 99232 SBSQ HOSP IP/OBS MODERATE 35: CPT

## 2022-10-13 RX ADMIN — SIMVASTATIN 10 MILLIGRAM(S): 20 TABLET, FILM COATED ORAL at 21:04

## 2022-10-13 RX ADMIN — Medication 100 MILLIGRAM(S): at 05:22

## 2022-10-13 RX ADMIN — GABAPENTIN 400 MILLIGRAM(S): 400 CAPSULE ORAL at 05:21

## 2022-10-13 RX ADMIN — MONTELUKAST 10 MILLIGRAM(S): 4 TABLET, CHEWABLE ORAL at 12:41

## 2022-10-13 RX ADMIN — AMIODARONE HYDROCHLORIDE 100 MILLIGRAM(S): 400 TABLET ORAL at 05:22

## 2022-10-13 RX ADMIN — PANTOPRAZOLE SODIUM 40 MILLIGRAM(S): 20 TABLET, DELAYED RELEASE ORAL at 05:20

## 2022-10-13 RX ADMIN — BUDESONIDE AND FORMOTEROL FUMARATE DIHYDRATE 2 PUFF(S): 160; 4.5 AEROSOL RESPIRATORY (INHALATION) at 17:28

## 2022-10-13 RX ADMIN — BUDESONIDE AND FORMOTEROL FUMARATE DIHYDRATE 2 PUFF(S): 160; 4.5 AEROSOL RESPIRATORY (INHALATION) at 05:20

## 2022-10-13 RX ADMIN — TRAMADOL HYDROCHLORIDE 50 MILLIGRAM(S): 50 TABLET ORAL at 00:05

## 2022-10-13 RX ADMIN — Medication 30 MILLIGRAM(S): at 05:21

## 2022-10-13 RX ADMIN — Medication 30 MILLIGRAM(S): at 17:28

## 2022-10-13 RX ADMIN — PANTOPRAZOLE SODIUM 40 MILLIGRAM(S): 20 TABLET, DELAYED RELEASE ORAL at 17:28

## 2022-10-13 RX ADMIN — TIOTROPIUM BROMIDE 1 CAPSULE(S): 18 CAPSULE ORAL; RESPIRATORY (INHALATION) at 05:21

## 2022-10-13 RX ADMIN — Medication 100 MILLIGRAM(S): at 15:32

## 2022-10-13 RX ADMIN — GABAPENTIN 400 MILLIGRAM(S): 400 CAPSULE ORAL at 17:28

## 2022-10-13 RX ADMIN — Medication 100 MILLIGRAM(S): at 21:04

## 2022-10-13 RX ADMIN — Medication 100 MILLIGRAM(S): at 00:23

## 2022-10-13 NOTE — PROGRESS NOTE ADULT - PROBLEM SELECTOR PLAN 3
Pt with history of aplastic anemia, follows Dr. Parker outpatient  Continue Procrit injection weekly with heme  Continue folic acid   s/p 1 unit pRBC on 10/7 and 2 unit pRBC 10/12  monitor hb   Transfuse if Hg <7.0  Heme follow up ongoing Epogen dependent; intermittently also requires RBC transfusion  Monitro cbc while in the hospital.  Outpatient routine follow up with hematologist for continued care and monitoring in the community PT as tolerated  Continue supportive measures

## 2022-10-13 NOTE — PROGRESS NOTE ADULT - PROBLEM SELECTOR PROBLEM 10
GERD (gastroesophageal reflux disease)
Constipation
GERD (gastroesophageal reflux disease)
Diabetes mellitus
Diabetes mellitus
GERD (gastroesophageal reflux disease)
Constipation
GERD (gastroesophageal reflux disease)

## 2022-10-13 NOTE — DISCHARGE NOTE NURSING/CASE MANAGEMENT/SOCIAL WORK - NSDCVIVACCINE_GEN_ALL_CORE_FT
Tdap; 23-Aug-2021 22:35; Danii Dubon (RN); [employer]; [id number] (Exp. Date: 01-Oct-2022); IntraMuscular; Deltoid Left.; 0.5 milliLiter(s); VIS (VIS Published: 09-May-2013, VIS Presented: 23-Aug-2021);

## 2022-10-13 NOTE — PROGRESS NOTE ADULT - PROBLEM SELECTOR PLAN 8
Pt with previous diagnosis of DM2  - Last A1c (4/22) 5.5  - correctional insulin as ordered  - A1c 5.2 Venodyne boots for DVT prophylaxis

## 2022-10-13 NOTE — PROGRESS NOTE ADULT - PROBLEM SELECTOR PLAN 2
PT as tolerated  Continue supportive measures Admitted with fever and found to have RLL PNA with suspected sepsis on admission;   Clinically improved  and has completed course of antibiotics.

## 2022-10-13 NOTE — PROGRESS NOTE ADULT - SUBJECTIVE AND OBJECTIVE BOX
MEDICAL ATTENDING NOTE    Patient is a 81y old  Female who presents with a chief complaint of Pneumonia (13 Oct 2022 11:44)      INTERVAL HPI/OVERNIGHT EVENTS: offers no new complaints today    MEDICATIONS  (STANDING):  aMIOdarone    Tablet 100 milliGRAM(s) Oral daily  benzonatate 100 milliGRAM(s) Oral three times a day  budesonide  80 MICROgram(s)/formoterol 4.5 MICROgram(s) Inhaler 2 Puff(s) Inhalation two times a day  dextrose 5%. 1000 milliLiter(s) (50 mL/Hr) IV Continuous <Continuous>  dextrose 5%. 1000 milliLiter(s) (100 mL/Hr) IV Continuous <Continuous>  dextrose 50% Injectable 25 Gram(s) IV Push once  dextrose 50% Injectable 12.5 Gram(s) IV Push once  dextrose 50% Injectable 25 Gram(s) IV Push once  diltiazem    Tablet 30 milliGRAM(s) Oral two times a day  gabapentin 400 milliGRAM(s) Oral two times a day  glucagon  Injectable 1 milliGRAM(s) IntraMuscular once  insulin lispro (ADMELOG) corrective regimen sliding scale   SubCutaneous three times a day before meals  insulin lispro (ADMELOG) corrective regimen sliding scale   SubCutaneous at bedtime  montelukast 10 milliGRAM(s) Oral daily  pantoprazole  Injectable 40 milliGRAM(s) IV Push two times a day  simvastatin 10 milliGRAM(s) Oral at bedtime  tiotropium 18 MICROgram(s) Capsule 1 Capsule(s) Inhalation daily    MEDICATIONS  (PRN):  acetaminophen     Tablet .. 650 milliGRAM(s) Oral every 6 hours PRN Temp greater or equal to 38C (100.4F), Mild Pain (1 - 3)  dextrose Oral Gel 15 Gram(s) Oral once PRN Blood Glucose LESS THAN 70 milliGRAM(s)/deciliter  guaiFENesin Oral Liquid (Sugar-Free) 200 milliGRAM(s) Oral every 6 hours PRN Cough  ondansetron Injectable 4 milliGRAM(s) IV Push every 8 hours PRN Nausea and/or Vomiting      __________________________________________________  ----------------------------------------------------------------------------------  REVIEW OF SYSTEMS: negative      Vital Signs Last 24 Hrs  T(C): 36.8 (13 Oct 2022 12:55), Max: 36.8 (12 Oct 2022 17:37)  T(F): 98.3 (13 Oct 2022 12:55), Max: 98.3 (12 Oct 2022 17:37)  HR: 73 (13 Oct 2022 13:57) (61 - 73)  BP: 134/70 (13 Oct 2022 12:55) (112/66 - 134/70)  BP(mean): --  RR: 18 (13 Oct 2022 12:55) (16 - 19)  SpO2: 90% (13 Oct 2022 13:57) (86% - 95%)    Parameters below as of 13 Oct 2022 13:20  Patient On (Oxygen Delivery Method): nasal cannula  O2 Flow (L/min): 1      _________________  PHYSICAL EXAM:  ---------------------------   NAD; Normocephalic;   LUNGS - no wheezing  HEART: S1 S2+   ABDOMEN: Soft, Nontender, non distended  EXTREMITIES: no cyanosis; no edema  NERVOUS SYSTEM:  Awake and alert; no focal neuro deficits appreciated    _________________________________________________  LABS:                        9.8    2.88  )-----------( 357      ( 13 Oct 2022 07:35 )             31.0     10-13    141  |  106  |  18  ----------------------------<  101<H>  4.4   |  30  |  0.90    Ca    8.8      13 Oct 2022 07:35  Mg     2.1     10-12    TPro  6.1  /  Alb  2.4<L>  /  TBili  0.7  /  DBili  x   /  AST  11<L>  /  ALT  13  /  AlkPhos  59  10-13    PT/INR - ( 13 Oct 2022 07:35 )   PT: 32.5 sec;   INR: 2.75 ratio             CAPILLARY BLOOD GLUCOSE      POCT Blood Glucose.: 106 mg/dL (13 Oct 2022 11:35)  POCT Blood Glucose.: 97 mg/dL (13 Oct 2022 07:41)  POCT Blood Glucose.: 103 mg/dL (13 Oct 2022 00:37)  POCT Blood Glucose.: 103 mg/dL (12 Oct 2022 21:59)  POCT Blood Glucose.: 125 mg/dL (12 Oct 2022 16:30)                Plan of care was discussed with patient ; all questions and concerns were addressed and care was aligned with patient's wishes.             MEDICAL ATTENDING NOTE    Patient is a 81y old  Female who presents with a chief complaint of Pneumonia (13 Oct 2022 11:44)      INTERVAL HPI/OVERNIGHT EVENTS: offers no new complaints today; feels better overall but still very weak.    MEDICATIONS  (STANDING):  aMIOdarone    Tablet 100 milliGRAM(s) Oral daily  benzonatate 100 milliGRAM(s) Oral three times a day  budesonide  80 MICROgram(s)/formoterol 4.5 MICROgram(s) Inhaler 2 Puff(s) Inhalation two times a day  dextrose 5%. 1000 milliLiter(s) (50 mL/Hr) IV Continuous <Continuous>  dextrose 5%. 1000 milliLiter(s) (100 mL/Hr) IV Continuous <Continuous>  dextrose 50% Injectable 25 Gram(s) IV Push once  dextrose 50% Injectable 12.5 Gram(s) IV Push once  dextrose 50% Injectable 25 Gram(s) IV Push once  diltiazem    Tablet 30 milliGRAM(s) Oral two times a day  gabapentin 400 milliGRAM(s) Oral two times a day  glucagon  Injectable 1 milliGRAM(s) IntraMuscular once  insulin lispro (ADMELOG) corrective regimen sliding scale   SubCutaneous three times a day before meals  insulin lispro (ADMELOG) corrective regimen sliding scale   SubCutaneous at bedtime  montelukast 10 milliGRAM(s) Oral daily  pantoprazole  Injectable 40 milliGRAM(s) IV Push two times a day  simvastatin 10 milliGRAM(s) Oral at bedtime  tiotropium 18 MICROgram(s) Capsule 1 Capsule(s) Inhalation daily    MEDICATIONS  (PRN):  acetaminophen     Tablet .. 650 milliGRAM(s) Oral every 6 hours PRN Temp greater or equal to 38C (100.4F), Mild Pain (1 - 3)  dextrose Oral Gel 15 Gram(s) Oral once PRN Blood Glucose LESS THAN 70 milliGRAM(s)/deciliter  guaiFENesin Oral Liquid (Sugar-Free) 200 milliGRAM(s) Oral every 6 hours PRN Cough  ondansetron Injectable 4 milliGRAM(s) IV Push every 8 hours PRN Nausea and/or Vomiting      __________________________________________________  ----------------------------------------------------------------------------------  REVIEW OF SYSTEMS: negative for fever or chets pain; SOB especially with minimal exertion; no diarrhea; BM+      Vital Signs Last 24 Hrs  T(C): 36.8 (13 Oct 2022 12:55), Max: 36.8 (12 Oct 2022 17:37)  T(F): 98.3 (13 Oct 2022 12:55), Max: 98.3 (12 Oct 2022 17:37)  HR: 73 (13 Oct 2022 13:57) (61 - 73)  BP: 134/70 (13 Oct 2022 12:55) (112/66 - 134/70)  RR: 18 (13 Oct 2022 12:55) (16 - 19)  SpO2: 90% (13 Oct 2022 13:57) (86% - 95%)    Parameters below as of 13 Oct 2022 13:20  Patient On (Oxygen Delivery Method): nasal cannula  O2 Flow (L/min): 1      _________________  PHYSICAL EXAM:  ---------------------------   NAD; Normocephalic;   LUNGS - no wheezing; decreased RLL air entry; RLL rales  HEART: S1 S2+   ABDOMEN: Soft, Nontender, non distended, BS+  EXTREMITIES: no cyanosis; no edema  NERVOUS SYSTEM:  Awake and alert; no gross neuro deficits    _________________________________________________  LABS:                        9.8    2.88  )-----------( 357      ( 13 Oct 2022 07:35 )             31.0     10-13    141  |  106  |  18  ----------------------------<  101<H>  4.4   |  30  |  0.90    Ca    8.8      13 Oct 2022 07:35  Mg     2.1     10-12    TPro  6.1  /  Alb  2.4<L>  /  TBili  0.7  /  DBili  x   /  AST  11<L>  /  ALT  13  /  AlkPhos  59  10-13    PT/INR - ( 13 Oct 2022 07:35 )   PT: 32.5 sec;   INR: 2.75 ratio             CAPILLARY BLOOD GLUCOSE      POCT Blood Glucose.: 106 mg/dL (13 Oct 2022 11:35)  POCT Blood Glucose.: 97 mg/dL (13 Oct 2022 07:41)  POCT Blood Glucose.: 103 mg/dL (13 Oct 2022 00:37)  POCT Blood Glucose.: 103 mg/dL (12 Oct 2022 21:59)  POCT Blood Glucose.: 125 mg/dL (12 Oct 2022 16:30)                Plan of care was discussed with patient ; all questions and concerns were addressed and care was aligned with patient's wishes.

## 2022-10-13 NOTE — DISCHARGE NOTE NURSING/CASE MANAGEMENT/SOCIAL WORK - NSDCPEFALRISK_GEN_ALL_CORE
For information on Fall & Injury Prevention, visit: [email or web address] OR  [email or web address] OR  https://www.cdc.gov/steadi/patient.html

## 2022-10-13 NOTE — PROGRESS NOTE ADULT - ASSESSMENT
81-year-old obese female past medical history previously diagnosed with DMII (no longer current), HTN, GERD, COPD not on home O2, pAfib s/p ablation currently on Coumadin, Aplastic Anemia, CKD, HFpEF admitted for pneumonia, course c/b pneumonia     HTN, PAFIB, Heart Failure   - Known pAfib, tele with SR 60-70s, no events. DC tele   - BP controlled   - Continue home amiodarone 100 mg PO qd and Cardizem 30 mg BID for rate control   - Metoprolol recently d/c' d outpatient by Dr. Stout secondary to episodes of dizziness   - resume coumadin when cleared by surgical team     - No meaningful signs of volume overload on exam   - Echo (4/2021) showed EF 57%   - Strict I/Os, daily weights.  - Monitor and replete lytes, keep K>4, Mg>2.  - Will continue to follow.    Kunal Osuna NP  Nurse Practitioner- Cardiology   [phone]

## 2022-10-13 NOTE — PROGRESS NOTE ADULT - ASSESSMENT
IMPRESSION  Patient with anemia multifactorial in anemia secondary to renal insufficiency, myelodysplasia now complicated by  admission with pneumonia  patient is MIRIAM dependent and does require occasional transfusion  s/p 1 unit PRBC with improvement in hgb 9.1  developed abd pain NV last night and now being treated for SBO  s/p NGT for decompression. fell out 10/11.   Had BM and passing flatus since.     Elevated ferritin with likely iron overload    Hgb declined.   c/o fatigue, inability to walk  Likely multifactorial including deconditioning. .   hgb improved    RECOMMEND:  1.  follow CBC  2.  to hold MIRIAM while hospitalized and will resume as an outpatient  3.  continue present abx  4.  continue surgical management  5.  s/p PRBC transfusion at admission. May need transfusion and follow CBC, but would prefer conservative strategy with regards to transfusion  to hold trnsfusion  patient potentially being discharged tomorrow and will re-evaluate as outpatient  6.  further hematologic recommendations pending clinical course  discussed with patient     DC planning when stable.

## 2022-10-13 NOTE — PROGRESS NOTE ADULT - PROBLEM SELECTOR PLAN 11
Chronic  - Bentyl 10mg bid PRN  - pt with bowel movements once every few days at baseline
DVT prophylaxis  -coumadin for A fib ordered
Chronic  - Bentyl 10mg bid PRN  - pt with bowel movements once every few days at baseline
Chronic  -Bentyl 10mg bid PRN  -pt with bowel movements once every few days at baseline
Chronic  - Bentyl 10mg bid PRN  - pt with bowel movements once every few days at baseline
DVT prophylaxis  -coumadin for A fib ordered
Pt follows Dr. Jun Oneil for chronic pain management  - Tramadol 50mg bid  - Gabapentin 400mg bid (given patients current GFR, max dose is 900mg per day)
Pt follows Dr. Jun Oneil for chronic pain management  - Tramadol 50mg bid  - Gabapentin 400mg bid (given patients current GFR, max dose is 900mg per day)

## 2022-10-13 NOTE — PROGRESS NOTE ADULT - TIME BILLING
Please call us with any concerns or questions.   We will continue to follow.     Barron Carter MD   Division of Infectious Diseases  [facility]   Cell [phone] between 8am and 6pm   After 6pm and weekends please call ID service at [phone].
Reviewing medical record including consultant recommendations, labs, vitals, medications, orders, imaging, and discussion of plan of care with patient, family, consultants, and nursing.
direct patient care including but not limited to reviewing chart, medications ,laboratory data, imaging reports, discussion of plan of care with consultants on the case, coordination of care with multidisciplinary team involved in the case and discussion of plan with patient.  Patient receptive and agreeable to plan of care and verbalized understanding the anticipated hospital course and treatment plan.  Discussed with discharge planner  Patient may need oxygen at home as she desaturates to 80's when ambulating

## 2022-10-13 NOTE — PROGRESS NOTE ADULT - SUBJECTIVE AND OBJECTIVE BOX
[facility] -- Tori Jones,  Sai, Girish Jackson [place], [place]  Office # [phone]    Follow Up:  Afib    Subjective/Observations: No events overnight resting comfortably in chair.  No complaints of chest pain, dyspnea, or palpitations reported. No signs of orthopnea or PND.    REVIEW OF SYSTEMS: All other review of systems is negative unless indicated above  PAST MEDICAL & SURGICAL HISTORY:  COPD (chronic obstructive pulmonary disease)      DM (diabetes mellitus)      PAF (paroxysmal atrial fibrillation)  s/p ablation      Hiatal hernia      GIB (gastrointestinal bleeding)      S/P hysterectomy      S/P foot surgery      S/P knee surgery      After cataract  bilateral eye cataract surgically removed      Closed right hip fracture  rigth hip ORIF july 19 2016      S/P IVC filter  nov 2016      S/P carpal tunnel release  Right 2008 &amp; 6/27/17        MEDICATIONS  (STANDING):  aMIOdarone    Tablet 100 milliGRAM(s) Oral daily  benzonatate 100 milliGRAM(s) Oral three times a day  budesonide  80 MICROgram(s)/formoterol 4.5 MICROgram(s) Inhaler 2 Puff(s) Inhalation two times a day  dextrose 5%. 1000 milliLiter(s) (50 mL/Hr) IV Continuous <Continuous>  dextrose 5%. 1000 milliLiter(s) (100 mL/Hr) IV Continuous <Continuous>  dextrose 50% Injectable 12.5 Gram(s) IV Push once  dextrose 50% Injectable 25 Gram(s) IV Push once  dextrose 50% Injectable 25 Gram(s) IV Push once  diltiazem    Tablet 30 milliGRAM(s) Oral two times a day  gabapentin 400 milliGRAM(s) Oral two times a day  glucagon  Injectable 1 milliGRAM(s) IntraMuscular once  insulin lispro (ADMELOG) corrective regimen sliding scale   SubCutaneous three times a day before meals  insulin lispro (ADMELOG) corrective regimen sliding scale   SubCutaneous at bedtime  montelukast 10 milliGRAM(s) Oral daily  pantoprazole  Injectable 40 milliGRAM(s) IV Push two times a day  simvastatin 10 milliGRAM(s) Oral at bedtime  tiotropium 18 MICROgram(s) Capsule 1 Capsule(s) Inhalation daily    MEDICATIONS  (PRN):  acetaminophen     Tablet .. 650 milliGRAM(s) Oral every 6 hours PRN Temp greater or equal to 38C (100.4F), Mild Pain (1 - 3)  dextrose Oral Gel 15 Gram(s) Oral once PRN Blood Glucose LESS THAN 70 milliGRAM(s)/deciliter  guaiFENesin Oral Liquid (Sugar-Free) 200 milliGRAM(s) Oral every 6 hours PRN Cough  ondansetron Injectable 4 milliGRAM(s) IV Push every 8 hours PRN Nausea and/or Vomiting    Allergies    No Known Allergies    Intolerances      Vital Signs Last 24 Hrs  T(C): 36.8 (13 Oct 2022 04:30), Max: 36.8 (12 Oct 2022 17:37)  T(F): 98.3 (13 Oct 2022 04:30), Max: 98.3 (12 Oct 2022 17:37)  HR: 61 (13 Oct 2022 04:30) (61 - 76)  BP: 121/75 (13 Oct 2022 04:30) (111/66 - 132/70)  BP(mean): --  RR: 19 (13 Oct 2022 04:30) (16 - 19)  SpO2: 93% (13 Oct 2022 04:30) (90% - 95%)    Parameters below as of 13 Oct 2022 04:30  Patient On (Oxygen Delivery Method): nasal cannula      I&O's Summary    12 Oct 2022 07:01  -  13 Oct 2022 07:00  --------------------------------------------------------  IN: 1725 mL / OUT: 700 mL / NET: 1025 mL        TELE: SR  PHYSICAL EXAM:  Constitutional: NAD, awake and alert, obese   HEENT: Moist Mucous Membranes, Anicteric  Pulmonary: Non-labored, breath sounds are clear bilaterally, No wheezing, crackles or rhonchi  Cardiovascular: Regular, S1 and S2 nl, No murmurs, rubs, gallops or clicks  Gastrointestinal: Bowel Sounds present, soft, nontender.   Lymph: No lymphadenopathy. No peripheral edema.  Skin: No visible rashes or ulcers.  Psych:  Mood & affect appropriate    LABS: All Labs Reviewed:                        9.8    2.88  )-----------( 357      ( 13 Oct 2022 07:35 )             31.0                         7.9    2.78  )-----------( 361      ( 12 Oct 2022 07:04 )             26.4                         8.3    4.07  )-----------( 369      ( 11 Oct 2022 06:30 )             27.6     13 Oct 2022 07:35    141    |  106    |  18     ----------------------------<  101    4.4     |  30     |  0.90   12 Oct 2022 07:04    140    |  106    |  18     ----------------------------<  118    4.5     |  29     |  0.91   11 Oct 2022 06:30    147    |  113    |  26     ----------------------------<  118    4.3     |  29     |  0.98     Ca    8.8        13 Oct 2022 07:35  Ca    7.8        12 Oct 2022 07:04  Ca    8.3        11 Oct 2022 06:30  Mg     2.1       12 Oct 2022 07:04    TPro  6.1    /  Alb  2.4    /  TBili  0.7    /  DBili  x      /  AST  11     /  ALT  13     /  AlkPhos  59     13 Oct 2022 07:35  TPro  6.1    /  Alb  2.3    /  TBili  0.4    /  DBili  x      /  AST  8      /  ALT  12     /  AlkPhos  59     12 Oct 2022 07:04  TPro  6.5    /  Alb  2.5    /  TBili  0.4    /  DBili  x      /  AST  8      /  ALT  13     /  AlkPhos  68     11 Oct 2022 06:30    PT/INR - ( 13 Oct 2022 07:35 )   PT: 32.5 sec;   INR: 2.75 ratio               12 Lead ECG:   Ventricular Rate 79 BPM    Atrial Rate 79 BPM    P-R Interval 136 ms    QRS Duration 78 ms    Q-T Interval 384 ms    QTC Calculation(Bazett) 440 ms    P Axis 85 degrees    R Axis -31 degrees    T Axis 72 degrees    Diagnosis Line Normal sinus rhythm  Left axis deviation  Confirmed by DAVID JACKSON ([id number]) on 10/5/2022 5:32:59 PM (10-05-22 @ 13:06)       EXAM:  ECHO TTE W/O CON COMP W/DOPPLR           PROCEDURE DATE:  07/17/2016      INTERPRETATION:  Ordering Physician: LIA MOLINA    Indication: Syncope    Study Quality: Technically difficult and limited     Height: 167 cm  Weight: 91 kg  BSA: 2.0 sq m  Blood Pressure: 104/70    MEASUREMENTS  IVS: 1.4cm  PWT: 1.4cm  LA: 4.4cm  AO: 3.6cm  LVIDd: 4.6cm  LVIDs: 2.4cm    FINDINGS  Left Ventricle: The endocardium is not well-visualized. Grossly, there   appears to be normal left ventricular systolic function.  Aortic Valve: Calcified trileaflet aortic valve with normal opening. No   significant aortic insufficiency.  Mitral Valve: Mitral annular calcification and calcified mitral valve   leaflets. Mild mitral insufficiency.  Tricuspid Valve: Not well visualized. Grossly normal. Mild tricuspid   insufficiency.  Pulmonic Valve: Not well visualized. Mild pulmonic insufficiency.  Left Atrium: Enlarged  Right Ventricle: Normal right ventricular size and systolic function.  Right Atrium: Enlarged  Diastolic function: Grade 1 diastolic dysfunction.                   DAVID JACKSON M.D., ATTENDING CARDIOLOGIST  This document has been electronically signed. Jul 18 2016  1:12P

## 2022-10-13 NOTE — PROGRESS NOTE ADULT - PROBLEM SELECTOR PLAN 1
Patient c/o abdominal pain, nausea, vomiting on 10/7 2/2 and found to have small bowel obstruction    - CT a/p: 1. Multiple dilated loops of small bowel with air-fluid levels.   Clinically improved with resolution of SBO  GI and surgery follow up ongoing Patient c/o abdominal pain, nausea, vomiting on 10/7 2/2 and found to have small bowel obstruction- now resolving; NGT out    - CT a/p: 1. Multiple dilated loops of small bowel with air-fluid levels.   Clinically improved with resolution of SBO  GI and surgery follow up ongoing

## 2022-10-13 NOTE — PROGRESS NOTE ADULT - PROBLEM SELECTOR PLAN 6
Pt with history of pAfib s/p ablation on Coumadin   - Daily INR checks  - Hold Coumadin. INR supratherapeutic today  - Home regimen: Coumadin 4mg Tuesday/Wednesday/Thursday/Sunday + Coumadin 6mg Monday/Friday/Saturday  - Continue Cardizem and Amiodarone 100mg qd Stable BP  Continue current meds  monitor Resolved  - Pt with CKD (G3a), baseline GFR ~42, Cr 1.2-1.3 at baseline per chart review  - Monitor BMP  - Avoid nephrotoxic medications  - Monitor renal indices

## 2022-10-13 NOTE — PROGRESS NOTE ADULT - PROBLEM SELECTOR PROBLEM 11
Chronic pain
Chronic pain
Constipation
Need for prophylactic measure
Constipation
Need for prophylactic measure
Constipation
Constipation

## 2022-10-13 NOTE — PROGRESS NOTE ADULT - PROBLEM SELECTOR PLAN 10
Chronic  - protonix while in hospital
Chronic  - Bentyl 10mg bid PRN  - pt with bowel movements once every few days at baseline
Chronic  -protonix while in hospital
Chronic  - Bentyl 10mg bid PRN  - pt with bowel movements once every few days at baseline
Pt with previous diagnosis of DM2  -Last A1c (4/22) 5.5  -correctional insulin as ordered  -f/u AM A1c
Pt with previous diagnosis of DM2  -Last A1c (4/22) 5.5  -correctional insulin as ordered  -A1c 5.2
Chronic  - protonix while in hospital
Chronic  - protonix while in hospital

## 2022-10-13 NOTE — PROGRESS NOTE ADULT - SUBJECTIVE AND OBJECTIVE BOX
[facility]  Berhane Maldonado PA-C  [address] [place] [address]  [phone]      INTERVAL HPI/OVERNIGHT EVENTS:  Pt s/e  Reports no abdominal pain  +flatus and BM  Tolerating diet  No further GI complaints    MEDICATIONS  (STANDING):  aMIOdarone    Tablet 100 milliGRAM(s) Oral daily  benzonatate 100 milliGRAM(s) Oral three times a day  budesonide  80 MICROgram(s)/formoterol 4.5 MICROgram(s) Inhaler 2 Puff(s) Inhalation two times a day  dextrose 5%. 1000 milliLiter(s) (50 mL/Hr) IV Continuous <Continuous>  dextrose 5%. 1000 milliLiter(s) (100 mL/Hr) IV Continuous <Continuous>  dextrose 50% Injectable 25 Gram(s) IV Push once  dextrose 50% Injectable 12.5 Gram(s) IV Push once  dextrose 50% Injectable 25 Gram(s) IV Push once  diltiazem    Tablet 30 milliGRAM(s) Oral two times a day  gabapentin 400 milliGRAM(s) Oral two times a day  glucagon  Injectable 1 milliGRAM(s) IntraMuscular once  insulin lispro (ADMELOG) corrective regimen sliding scale   SubCutaneous three times a day before meals  insulin lispro (ADMELOG) corrective regimen sliding scale   SubCutaneous at bedtime  montelukast 10 milliGRAM(s) Oral daily  pantoprazole  Injectable 40 milliGRAM(s) IV Push two times a day  simvastatin 10 milliGRAM(s) Oral at bedtime  tiotropium 18 MICROgram(s) Capsule 1 Capsule(s) Inhalation daily    MEDICATIONS  (PRN):  acetaminophen     Tablet .. 650 milliGRAM(s) Oral every 6 hours PRN Temp greater or equal to 38C (100.4F), Mild Pain (1 - 3)  dextrose Oral Gel 15 Gram(s) Oral once PRN Blood Glucose LESS THAN 70 milliGRAM(s)/deciliter  guaiFENesin Oral Liquid (Sugar-Free) 200 milliGRAM(s) Oral every 6 hours PRN Cough  ondansetron Injectable 4 milliGRAM(s) IV Push every 8 hours PRN Nausea and/or Vomiting      Allergies    No Known Allergies    PHYSICAL EXAM:   Vital Signs:  Vital Signs Last 24 Hrs  T(C): 36.8 (13 Oct 2022 04:30), Max: 36.8 (12 Oct 2022 17:37)  T(F): 98.3 (13 Oct 2022 04:30), Max: 98.3 (12 Oct 2022 17:37)  HR: 69 (13 Oct 2022 11:00) (61 - 76)  BP: 129/70 (13 Oct 2022 11:00) (111/66 - 132/70)  BP(mean): --  RR: 19 (13 Oct 2022 04:30) (16 - 19)  SpO2: 94% (13 Oct 2022 11:00) (90% - 95%)    Parameters below as of 13 Oct 2022 11:00  Patient On (Oxygen Delivery Method): room air      Daily     Daily Weight in k.6 (13 Oct 2022 04:30)    GENERAL:  Appears stated age  ABDOMEN:  Soft, non-tender, non-distended  NEURO:  Alert      LABS:                        9.8    2.88  )-----------( 357      ( 13 Oct 2022 07:35 )             31.0     10-13    141  |  106  |  18  ----------------------------<  101<H>  4.4   |  30  |  0.90    Ca    8.8      13 Oct 2022 07:35  Mg     2.1     10-12    TPro  6.1  /  Alb  2.4<L>  /  TBili  0.7  /  DBili  x   /  AST  11<L>  /  ALT  13  /  AlkPhos  59  10-13    PT/INR - ( 13 Oct 2022 07:35 )   PT: 32.5 sec;   INR: 2.75 ratio

## 2022-10-13 NOTE — PROGRESS NOTE ADULT - PROBLEM SELECTOR PLAN 7
Chronic, stable  - Cardizem 30mg bid with hold parameters  - Monitor routine hemodynamics Pt with previous diagnosis of DM2  - Last A1c (4/22) 5.5  - correctional insulin as ordered  - A1c 5.2 Stable BP  Continue current meds  monitor

## 2022-10-13 NOTE — PROGRESS NOTE ADULT - ASSESSMENT
81-year-old obese female past medical history previously diagnosied with DMII (no longer current), HTN, GERD, COPD not on home O2, pAfib s/p ablation currently on Coumadin, Aplastic Anemia, CKD, HFpEF admitted for pneumonia, on IV Rocephin and Azithro, course c/b SBO. 81-year-old obese female past medical history previously diagnosed with DMII (no longer current), HTN, GERD, COPD not on home O2, pAfib s/p ablation currently on Coumadin, Aplastic Anemia, CKD, HFpEF admitted for pneumonia, on IV Rocephin and Azithro, course c/b SBO.        1. Small bowel obstruction- resolving  2. Bacterial PNA with suspected sepsis on admission in the ED  3. Metabolic encephalopathy due to acute illness- resolved  4. Hypoxia likely due to PNA with suspected acute hypoxic respiratory failure requiring supplemental oxygen  5. Generalized muscle weakness  6. CKD 3  7. Myelodysplastic syndrome - procrit and PRBC dependent   8. Chronic atrial fibrillation on coumadin  9. Hypalbuminemia nutritional supplements as tolerated

## 2022-10-13 NOTE — PROGRESS NOTE ADULT - SUBJECTIVE AND OBJECTIVE BOX
Interval History:  tolerating diet. no abdominal pain or vominting  Chart reviewed and events noted;   Overnight events:    MEDICATIONS  (STANDING):  aMIOdarone    Tablet 100 milliGRAM(s) Oral daily  benzonatate 100 milliGRAM(s) Oral three times a day  budesonide  80 MICROgram(s)/formoterol 4.5 MICROgram(s) Inhaler 2 Puff(s) Inhalation two times a day  dextrose 5%. 1000 milliLiter(s) (50 mL/Hr) IV Continuous <Continuous>  dextrose 5%. 1000 milliLiter(s) (100 mL/Hr) IV Continuous <Continuous>  dextrose 50% Injectable 25 Gram(s) IV Push once  dextrose 50% Injectable 12.5 Gram(s) IV Push once  dextrose 50% Injectable 25 Gram(s) IV Push once  diltiazem    Tablet 30 milliGRAM(s) Oral two times a day  gabapentin 400 milliGRAM(s) Oral two times a day  glucagon  Injectable 1 milliGRAM(s) IntraMuscular once  insulin lispro (ADMELOG) corrective regimen sliding scale   SubCutaneous three times a day before meals  insulin lispro (ADMELOG) corrective regimen sliding scale   SubCutaneous at bedtime  montelukast 10 milliGRAM(s) Oral daily  pantoprazole  Injectable 40 milliGRAM(s) IV Push two times a day  simvastatin 10 milliGRAM(s) Oral at bedtime  tiotropium 18 MICROgram(s) Capsule 1 Capsule(s) Inhalation daily    MEDICATIONS  (PRN):  acetaminophen     Tablet .. 650 milliGRAM(s) Oral every 6 hours PRN Temp greater or equal to 38C (100.4F), Mild Pain (1 - 3)  dextrose Oral Gel 15 Gram(s) Oral once PRN Blood Glucose LESS THAN 70 milliGRAM(s)/deciliter  guaiFENesin Oral Liquid (Sugar-Free) 200 milliGRAM(s) Oral every 6 hours PRN Cough  ondansetron Injectable 4 milliGRAM(s) IV Push every 8 hours PRN Nausea and/or Vomiting      Vital Signs Last 24 Hrs  T(C): 37.1 (13 Oct 2022 20:09), Max: 37.1 (13 Oct 2022 20:09)  T(F): 98.7 (13 Oct 2022 20:09), Max: 98.7 (13 Oct 2022 20:09)  HR: 67 (13 Oct 2022 20:09) (61 - 73)  BP: 128/73 (13 Oct 2022 20:09) (121/75 - 134/70)  BP(mean): --  RR: 18 (13 Oct 2022 20:09) (18 - 19)  SpO2: 95% (13 Oct 2022 20:09) (86% - 95%)    Parameters below as of 13 Oct 2022 20:09  Patient On (Oxygen Delivery Method): nasal cannula  O2 Flow (L/min): 1      PHYSICAL EXAM  General: adult in NAD  HEENT: clear oropharynx, anicteric sclera, pink conjunctivae  Neck: supple  CV: normal S1S2 with no murmur rubs or gallops  Lungs: clear to auscultation, no wheezes, no rhales  Abdomen: soft non-tender non-distended, no hepato/splenomegaly  Ext: no clubbing cyanosis or edema  Skin: no rashes and no petichiae  Neuro: alert and oriented X3 no focal deficits      LABS:  CBC Full  -  ( 13 Oct 2022 07:35 )  WBC Count : 2.88 K/uL  RBC Count : 3.25 M/uL  Hemoglobin : 9.8 g/dL  Hematocrit : 31.0 %  Platelet Count - Automated : 357 K/uL  Mean Cell Volume : 95.4 fl  Mean Cell Hemoglobin : 30.2 pg  Mean Cell Hemoglobin Concentration : 31.6 gm/dL  Auto Neutrophil # : 1.05 K/uL  Auto Lymphocyte # : 1.32 K/uL  Auto Monocyte # : 0.45 K/uL  Auto Eosinophil # : 0.01 K/uL  Auto Basophil # : 0.02 K/uL  Auto Neutrophil % : 36.6 %  Auto Lymphocyte % : 45.8 %  Auto Monocyte % : 15.6 %  Auto Eosinophil % : 0.3 %  Auto Basophil % : 0.7 %    10-13    141  |  106  |  18  ----------------------------<  101<H>  4.4   |  30  |  0.90    Ca    8.8      13 Oct 2022 07:35  Mg     2.1     10-12    TPro  6.1  /  Alb  2.4<L>  /  TBili  0.7  /  DBili  x   /  AST  11<L>  /  ALT  13  /  AlkPhos  59  10-13    PT/INR - ( 13 Oct 2022 07:35 )   PT: 32.5 sec;   INR: 2.75 ratio             fe studies      WBC trend  2.88 K/uL (10-13-22 @ 07:35)  2.78 K/uL (10-12-22 @ 07:04)  4.07 K/uL (10-11-22 @ 06:30)      Hgb trend  9.8 g/dL (10-13-22 @ 07:35)  7.9 g/dL (10-12-22 @ 07:04)  8.3 g/dL (10-11-22 @ 06:30)      plt trend  357 K/uL (10-13-22 @ 07:35)  361 K/uL (10-12-22 @ 07:04)  369 K/uL (10-11-22 @ 06:30)        RADIOLOGY & ADDITIONAL STUDIES:

## 2022-10-13 NOTE — DISCHARGE NOTE NURSING/CASE MANAGEMENT/SOCIAL WORK - PATIENT PORTAL LINK FT
You can access the FollowMyHealth Patient Portal offered by [facility] by registering at the following website: [email or web address]. By joining [facility] FollowMyHealth portal, you will also be able to view your health information using other applications (apps) compatible with our system.

## 2022-10-13 NOTE — PROGRESS NOTE ADULT - PROBLEM SELECTOR PLAN 5
Resolved  - Pt with CKD (G3a), baseline GFR ~42, Cr 1.2-1.3 at baseline per chart review  - Monitor BMP  - Avoid nephrotoxic medications  - Monitor renal indices Continue Trelegy Ellipta therapeutic interchange in hospital  Continue Singulair 10 mg qd  Encourage incentive spirometry  Pulm. consult follow up ongoing

## 2022-10-13 NOTE — PROGRESS NOTE ADULT - PROBLEM SELECTOR PLAN 4
Continue Trelegy Ellipta therapeutic interchange in hospital  Continue Singulair 10 mg qd  Encourage incentive spirometry  Pulm. consult follow up ongoing Epogen dependent; intermittently also requires RBC transfusion  Monitro cbc while in the hospital.  Outpatient routine follow up with hematologist for continued care and monitoring in the community

## 2022-10-14 LAB
GRAM STN FLD: SIGNIFICANT CHANGE UP
INR BLD: 1.61 RATIO — HIGH (ref 0.88–1.16)
PROTHROM AB SERPL-ACNC: 18.9 SEC — HIGH (ref 10.5–13.4)
SPECIMEN SOURCE: SIGNIFICANT CHANGE UP

## 2022-10-14 PROCEDURE — 99232 SBSQ HOSP IP/OBS MODERATE 35: CPT

## 2022-10-14 RX ORDER — WARFARIN SODIUM 2.5 MG/1
4 TABLET ORAL ONCE
Refills: 0 | Status: DISCONTINUED | OUTPATIENT
Start: 2022-10-14 | End: 2022-10-14

## 2022-10-14 RX ORDER — ERYTHROPOIETIN 10000 [IU]/ML
40000 INJECTION, SOLUTION INTRAVENOUS; SUBCUTANEOUS ONCE
Refills: 0 | Status: DISCONTINUED | OUTPATIENT
Start: 2022-10-15 | End: 2022-10-15

## 2022-10-14 RX ORDER — WARFARIN SODIUM 2.5 MG/1
5 TABLET ORAL ONCE
Refills: 0 | Status: COMPLETED | OUTPATIENT
Start: 2022-10-14 | End: 2022-10-14

## 2022-10-14 RX ADMIN — PANTOPRAZOLE SODIUM 40 MILLIGRAM(S): 20 TABLET, DELAYED RELEASE ORAL at 06:00

## 2022-10-14 RX ADMIN — PANTOPRAZOLE SODIUM 40 MILLIGRAM(S): 20 TABLET, DELAYED RELEASE ORAL at 17:38

## 2022-10-14 RX ADMIN — BUDESONIDE AND FORMOTEROL FUMARATE DIHYDRATE 2 PUFF(S): 160; 4.5 AEROSOL RESPIRATORY (INHALATION) at 17:37

## 2022-10-14 RX ADMIN — Medication 30 MILLIGRAM(S): at 17:38

## 2022-10-14 RX ADMIN — BUDESONIDE AND FORMOTEROL FUMARATE DIHYDRATE 2 PUFF(S): 160; 4.5 AEROSOL RESPIRATORY (INHALATION) at 06:00

## 2022-10-14 RX ADMIN — Medication 30 MILLIGRAM(S): at 05:59

## 2022-10-14 RX ADMIN — Medication 100 MILLIGRAM(S): at 21:14

## 2022-10-14 RX ADMIN — WARFARIN SODIUM 5 MILLIGRAM(S): 2.5 TABLET ORAL at 21:14

## 2022-10-14 RX ADMIN — TIOTROPIUM BROMIDE 1 CAPSULE(S): 18 CAPSULE ORAL; RESPIRATORY (INHALATION) at 06:01

## 2022-10-14 RX ADMIN — AMIODARONE HYDROCHLORIDE 100 MILLIGRAM(S): 400 TABLET ORAL at 05:59

## 2022-10-14 RX ADMIN — Medication 100 MILLIGRAM(S): at 05:59

## 2022-10-14 RX ADMIN — MONTELUKAST 10 MILLIGRAM(S): 4 TABLET, CHEWABLE ORAL at 13:02

## 2022-10-14 RX ADMIN — GABAPENTIN 400 MILLIGRAM(S): 400 CAPSULE ORAL at 17:41

## 2022-10-14 RX ADMIN — SIMVASTATIN 10 MILLIGRAM(S): 20 TABLET, FILM COATED ORAL at 21:13

## 2022-10-14 RX ADMIN — GABAPENTIN 400 MILLIGRAM(S): 400 CAPSULE ORAL at 06:19

## 2022-10-14 RX ADMIN — Medication 100 MILLIGRAM(S): at 13:13

## 2022-10-14 NOTE — PROGRESS NOTE ADULT - SUBJECTIVE AND OBJECTIVE BOX
MEDICAL ATTENDING NOTE    Patient is a 81y old  Female who presents with a chief complaint of Pneumonia (14 Oct 2022 15:37)      INTERVAL HPI/OVERNIGHT EVENTS: offers no new complaints today    MEDICATIONS  (STANDING):  aMIOdarone    Tablet 100 milliGRAM(s) Oral daily  benzonatate 100 milliGRAM(s) Oral three times a day  budesonide  80 MICROgram(s)/formoterol 4.5 MICROgram(s) Inhaler 2 Puff(s) Inhalation two times a day  dextrose 5%. 1000 milliLiter(s) (100 mL/Hr) IV Continuous <Continuous>  dextrose 5%. 1000 milliLiter(s) (50 mL/Hr) IV Continuous <Continuous>  dextrose 50% Injectable 25 Gram(s) IV Push once  dextrose 50% Injectable 12.5 Gram(s) IV Push once  dextrose 50% Injectable 25 Gram(s) IV Push once  diltiazem    Tablet 30 milliGRAM(s) Oral two times a day  gabapentin 400 milliGRAM(s) Oral two times a day  glucagon  Injectable 1 milliGRAM(s) IntraMuscular once  insulin lispro (ADMELOG) corrective regimen sliding scale   SubCutaneous three times a day before meals  insulin lispro (ADMELOG) corrective regimen sliding scale   SubCutaneous at bedtime  montelukast 10 milliGRAM(s) Oral daily  pantoprazole  Injectable 40 milliGRAM(s) IV Push two times a day  simvastatin 10 milliGRAM(s) Oral at bedtime  tiotropium 18 MICROgram(s) Capsule 1 Capsule(s) Inhalation daily  warfarin 4 milliGRAM(s) Oral once    MEDICATIONS  (PRN):  acetaminophen     Tablet .. 650 milliGRAM(s) Oral every 6 hours PRN Temp greater or equal to 38C (100.4F), Mild Pain (1 - 3)  dextrose Oral Gel 15 Gram(s) Oral once PRN Blood Glucose LESS THAN 70 milliGRAM(s)/deciliter  guaiFENesin Oral Liquid (Sugar-Free) 200 milliGRAM(s) Oral every 6 hours PRN Cough  ondansetron Injectable 4 milliGRAM(s) IV Push every 8 hours PRN Nausea and/or Vomiting      __________________________________________________  ----------------------------------------------------------------------------------  REVIEW OF SYSTEMS: no fever; no CP; had a largeBM+      Vital Signs Last 24 Hrs  T(C): 36.9 (14 Oct 2022 13:17), Max: 37.1 (13 Oct 2022 20:09)  T(F): 98.5 (14 Oct 2022 13:17), Max: 98.7 (13 Oct 2022 20:09)  HR: 69 (14 Oct 2022 13:17) (64 - 69)  BP: 144/71 (14 Oct 2022 13:17) (127/75 - 144/71)  RR: 18 (14 Oct 2022 13:17) (18 - 19)  SpO2: 94% (14 Oct 2022 13:17) (94% - 95%)    Parameters below as of 14 Oct 2022 13:17  Patient On (Oxygen Delivery Method): nasal cannula  O2 Flow (L/min): 1      _________________  PHYSICAL EXAM:  ---------------------------   NAD; Normocephalic;   LUNGS - no wheezing  HEART: S1 S2+   ABDOMEN: Soft, Nontender, non distended, BS+  EXTREMITIES: no cyanosis; no edema  NERVOUS SYSTEM:  Awake and alert; no focal neuro deficits appreciated    _________________________________________________  LABS:                        9.8    2.88  )-----------( 357      ( 13 Oct 2022 07:35 )             31.0     10-13    141  |  106  |  18  ----------------------------<  101<H>  4.4   |  30  |  0.90    Ca    8.8      13 Oct 2022 07:35    TPro  6.1  /  Alb  2.4<L>  /  TBili  0.7  /  DBili  x   /  AST  11<L>  /  ALT  13  /  AlkPhos  59  10-13    PT/INR - ( 13 Oct 2022 07:35 )   PT: 32.5 sec;   INR: 2.75 ratio             CAPILLARY BLOOD GLUCOSE      POCT Blood Glucose.: 96 mg/dL (14 Oct 2022 11:33)  POCT Blood Glucose.: 100 mg/dL (14 Oct 2022 07:44)  POCT Blood Glucose.: 106 mg/dL (13 Oct 2022 21:12)  POCT Blood Glucose.: 114 mg/dL (13 Oct 2022 17:15)                Plan of care was discussed with patient ; all questions and concerns were addressed and care was aligned with patient's wishes.

## 2022-10-14 NOTE — PROGRESS NOTE ADULT - ASSESSMENT
SBO    diet as tolerated    surgical eval noted  conservative management for now  dc planning    I reviewed the overnight course of events on the unit, re-confirming the patient history. I discussed the care with the patient and their family  Differential diagnosis and plan of care discussed with patient after the evaluation  35 minutes spent on total encounter of which more than fifty percent of the encounter was spent counseling and/or coordinating care by the attending physician.  Advanced care planning was discussed with patient and family.  Advanced care planning forms were reviewed and discussed.  Risks, benefits and alternatives of gastroenterologic procedures were discussed in detail and all questions were answered.

## 2022-10-14 NOTE — PROGRESS NOTE ADULT - SUBJECTIVE AND OBJECTIVE BOX
Patient is a 81y old  Female who presents with a chief complaint of Pneumonia (14 Oct 2022 18:58)      INTERVAL HPI/OVERNIGHT EVENTS:    continues to improve. scheduled for discharge in AM. Admits to occasional abdominal discomfort    MEDICATIONS  (STANDING):  aMIOdarone    Tablet 100 milliGRAM(s) Oral daily  benzonatate 100 milliGRAM(s) Oral three times a day  budesonide  80 MICROgram(s)/formoterol 4.5 MICROgram(s) Inhaler 2 Puff(s) Inhalation two times a day  dextrose 5%. 1000 milliLiter(s) (50 mL/Hr) IV Continuous <Continuous>  dextrose 5%. 1000 milliLiter(s) (100 mL/Hr) IV Continuous <Continuous>  dextrose 50% Injectable 12.5 Gram(s) IV Push once  dextrose 50% Injectable 25 Gram(s) IV Push once  dextrose 50% Injectable 25 Gram(s) IV Push once  diltiazem    Tablet 30 milliGRAM(s) Oral two times a day  gabapentin 400 milliGRAM(s) Oral two times a day  glucagon  Injectable 1 milliGRAM(s) IntraMuscular once  insulin lispro (ADMELOG) corrective regimen sliding scale   SubCutaneous three times a day before meals  insulin lispro (ADMELOG) corrective regimen sliding scale   SubCutaneous at bedtime  montelukast 10 milliGRAM(s) Oral daily  pantoprazole  Injectable 40 milliGRAM(s) IV Push two times a day  simvastatin 10 milliGRAM(s) Oral at bedtime  tiotropium 18 MICROgram(s) Capsule 1 Capsule(s) Inhalation daily  warfarin 5 milliGRAM(s) Oral once      MEDICATIONS  (PRN):  acetaminophen     Tablet .. 650 milliGRAM(s) Oral every 6 hours PRN Temp greater or equal to 38C (100.4F), Mild Pain (1 - 3)  dextrose Oral Gel 15 Gram(s) Oral once PRN Blood Glucose LESS THAN 70 milliGRAM(s)/deciliter  guaiFENesin Oral Liquid (Sugar-Free) 200 milliGRAM(s) Oral every 6 hours PRN Cough  ondansetron Injectable 4 milliGRAM(s) IV Push every 8 hours PRN Nausea and/or Vomiting      Allergies    No Known Allergies    Intolerances        PAST MEDICAL & SURGICAL HISTORY:  COPD (chronic obstructive pulmonary disease)      DM (diabetes mellitus)      PAF (paroxysmal atrial fibrillation)  s/p ablation      Hiatal hernia      GIB (gastrointestinal bleeding)      S/P hysterectomy      S/P foot surgery      S/P knee surgery      After cataract  bilateral eye cataract surgically removed      Closed right hip fracture  rigth hip ORIF july 19 2016      S/P IVC filter  nov 2016      S/P carpal tunnel release  Right 2008 &amp; 6/27/17          Vital Signs Last 24 Hrs  T(C): 36.9 (14 Oct 2022 13:17), Max: 37.1 (13 Oct 2022 20:09)  T(F): 98.5 (14 Oct 2022 13:17), Max: 98.7 (13 Oct 2022 20:09)  HR: 71 (14 Oct 2022 17:36) (64 - 71)  BP: 164/73 (14 Oct 2022 17:36) (127/75 - 164/73)  BP(mean): --  RR: 18 (14 Oct 2022 13:17) (18 - 19)  SpO2: 94% (14 Oct 2022 13:17) (94% - 95%)    Parameters below as of 14 Oct 2022 13:17  Patient On (Oxygen Delivery Method): nasal cannula  O2 Flow (L/min): 1      PHYSICAL EXAMINATION:    GENERAL: The patient is awake and alert in no apparent distress.     HEENT: Head is normocephalic and atraumatic    NECK: no JVD    LUNGS: diminished air entry bilateral    HEART: Irregular rate and rhythm without murmur.    ABDOMEN: Soft, nontender, and nondistended.      EXTREMITIES: Without any cyanosis, clubbing, rash, lesions or edema.    NEUROLOGIC: Grossly intact.    SKIN: No ulceration or induration present.      LABS:                        9.8    2.88  )-----------( 357      ( 13 Oct 2022 07:35 )             31.0     10-13    141  |  106  |  18  ----------------------------<  101<H>  4.4   |  30  |  0.90    Ca    8.8      13 Oct 2022 07:35    TPro  6.1  /  Alb  2.4<L>  /  TBili  0.7  /  DBili  x   /  AST  11<L>  /  ALT  13  /  AlkPhos  59  10-13    PT/INR - ( 14 Oct 2022 15:50 )   PT: 18.9 sec;   INR: 1.61 ratio                   Assessment:    Resolved Pneumonia  Acute Hypoxic Respiratory failure  COPD  Resolved small bowel obstruction  Hx Pulmonary emboli/DVT - S/P IVC filter  Atrial fibrillation    Plan:    Anticoagulation with Coumadin  Trelegy 1 puff daily  Supplemental oxygen at home  Albuterol PRN  Will require physical therapy after discharge

## 2022-10-14 NOTE — PROGRESS NOTE ADULT - ASSESSMENT
IMPRESSION  Patient with anemia multifactorial in anemia secondary to renal insufficiency, myelodysplasia now complicated by  admission with pneumonia  patient is MIRIAM dependent and does require occasional transfusion  s/p 1 unit PRBC with improvement in hgb 9.1  developed abd pain NV last night and now being treated for SBO  s/p NGT for decompression. fell out 10/11.   Had BM and passing flatus since.     Elevated ferritin with likely iron overload    Hgb declined.   c/o fatigue, inability to walk  Likely multifactorial including deconditioning. .   hgb improved    RECOMMEND:  1.  follow CBC  2.  to hold MIRIAM while hospitalized and will resume as an outpatient  3.  continue present abx  4.  continue surgical management  5.  s/p PRBC transfusion at admission.   would prefer conservative strategy with regards to transfusion  s/p transfusion 2d aoo    6.  further hematologic recommendations pending clinical course  discussed with patient     Retacrit tomorrow at DC    DC planning when stable.   Follow in office post DC.     Thank you for consulting us.   No additional recommendations at current time.   Will sign off on case for now.   Please call, or re-consult if needed.

## 2022-10-14 NOTE — PROGRESS NOTE ADULT - SUBJECTIVE AND OBJECTIVE BOX
[INTERVAL HX: ]  Patient seen and examined;  Chart reviewed and events noted;     s/p transfusion 2d ago  no CBC today    Hemoglobin yesterday 9.8 g/dL.  Possible discharge tomorrow.  Discussed with patient and sister at bedside.    Able to eat.  Positive bowel movement.  Positive flatus.  Bowel movement slight loose.  No blood.  No bleeding.      [HEALTH ISSUES]      HEALTH ISSUES - PROBLEM Dx:  KIMBERLY (acute kidney injury)    COPD, mild    Aplastic anemia    Diabetes mellitus    Weakness    Sepsis    Need for prophylactic measure    Hypertension    Anemia secondary to renal failure    Other myelodysplastic syndromes    Small bowel obstruction    SBO (small bowel obstruction)    Prophylactic measure    RLL pneumonia            [MEDICATIONS]  MEDICATIONS  (STANDING):  aMIOdarone    Tablet 100 milliGRAM(s) Oral daily  benzonatate 100 milliGRAM(s) Oral three times a day  budesonide  80 MICROgram(s)/formoterol 4.5 MICROgram(s) Inhaler 2 Puff(s) Inhalation two times a day  dextrose 5%. 1000 milliLiter(s) (50 mL/Hr) IV Continuous <Continuous>  dextrose 5%. 1000 milliLiter(s) (100 mL/Hr) IV Continuous <Continuous>  dextrose 50% Injectable 12.5 Gram(s) IV Push once  dextrose 50% Injectable 25 Gram(s) IV Push once  dextrose 50% Injectable 25 Gram(s) IV Push once  diltiazem    Tablet 30 milliGRAM(s) Oral two times a day  gabapentin 400 milliGRAM(s) Oral two times a day  glucagon  Injectable 1 milliGRAM(s) IntraMuscular once  insulin lispro (ADMELOG) corrective regimen sliding scale   SubCutaneous three times a day before meals  insulin lispro (ADMELOG) corrective regimen sliding scale   SubCutaneous at bedtime  montelukast 10 milliGRAM(s) Oral daily  pantoprazole  Injectable 40 milliGRAM(s) IV Push two times a day  simvastatin 10 milliGRAM(s) Oral at bedtime  tiotropium 18 MICROgram(s) Capsule 1 Capsule(s) Inhalation daily  warfarin 5 milliGRAM(s) Oral once    MEDICATIONS  (PRN):  acetaminophen     Tablet .. 650 milliGRAM(s) Oral every 6 hours PRN Temp greater or equal to 38C (100.4F), Mild Pain (1 - 3)  dextrose Oral Gel 15 Gram(s) Oral once PRN Blood Glucose LESS THAN 70 milliGRAM(s)/deciliter  guaiFENesin Oral Liquid (Sugar-Free) 200 milliGRAM(s) Oral every 6 hours PRN Cough  ondansetron Injectable 4 milliGRAM(s) IV Push every 8 hours PRN Nausea and/or Vomiting      [VITALS]  Vital Signs Last 24 Hrs  T(C): 36.9 (14 Oct 2022 13:17), Max: 37.1 (13 Oct 2022 20:09)  T(F): 98.5 (14 Oct 2022 13:17), Max: 98.7 (13 Oct 2022 20:09)  HR: 71 (14 Oct 2022 17:36) (64 - 71)  BP: 164/73 (14 Oct 2022 17:36) (127/75 - 164/73)  BP(mean): --  RR: 18 (14 Oct 2022 13:17) (18 - 19)  SpO2: 94% (14 Oct 2022 13:17) (94% - 95%)    Parameters below as of 14 Oct 2022 13:17  Patient On (Oxygen Delivery Method): nasal cannula  O2 Flow (L/min): 1    [WT/HT]  Daily     Daily Weight in k.9 (14 Oct 2022 05:38)  [VENT]      [PHYSICAL EXAM]  General: WN,  WD adult in NAD.  HEENT:  anicteric sclera, pink conjunctivae,   xxxxx clear oropharynx  Neck: supple, no masses.  CV: normal S1S2, no murmur, no rubs, no gallops.  Lungs: clear to auscultation, no wheezes, no rales, no rhonchi.  Abdomen: soft, non-tender, non-distended, no hepatosplenomegaly, normal BS, no guarding.  Ext: no clubbing, no cyanosis, no edema.  Skin: no rashes,  no petechiae, no venous stasis changes.  Neuro: alert and oriented X 3, no focal motor deficits.  LN: no SC LILLIAN.    [LABS:]                        9.8    2.88  )-----------( 357      ( 13 Oct 2022 07:35 )             31.0     10-13    141  |  106  |  18  ----------------------------<  101<H>  4.4   |  30  |  0.90    Ca    8.8      13 Oct 2022 07:35    TPro  6.1  /  Alb  2.4<L>  /  TBili  0.7  /  DBili  x   /  AST  11<L>  /  ALT  13  /  AlkPhos  59  10-13    PT/INR - ( 14 Oct 2022 15:50 )   PT: 18.9 sec;   INR: 1.61 ratio             Culture - Sputum (collected 10-14-22 @ 06:00)  Source: .Sputum Sputum noemi kit  Gram Stain (10-14-22 @ 14:42):    Rare polymorphonuclear leukocytes per low power field    Rare Squamous epithelial cells per low power field    Few Gram positive cocci in pairs per oil power field    Rare Gram Variable Rods per oil power field        Iron - Total Binding Capacity.: 230 ug/dL [220 - 430] (22 @ 14:05)  Ferritin, Serum: 1349 ng/mL *H* [15 - 150] (22 @ 14:05)        Culture - Sputum (collected 14 Oct 2022 06:00)  Source: .Sputum Sputum noemi kit  Gram Stain (14 Oct 2022 14:42):    Rare polymorphonuclear leukocytes per low power field    Rare Squamous epithelial cells per low power field    Few Gram positive cocci in pairs per oil power field    Rare Gram Variable Rods per oil power field      COVID-19 PCR: NotDetec (12 Oct 2022 05:56)  COVID-19 PCR: NotDetec (05 Oct 2022 13:40)  SARS-CoV-2: NotDetec (05 Oct 2022 13:40)  COVID-19 PCR: NotDetec (2022 07:32)  SARS-CoV-2: NotDetec (2022 12:17)        Culture - Sputum (collected 14 Oct 2022 06:00)  Source: .Sputum Sputum noemi kit  Gram Stain (14 Oct 2022 14:42):    Rare polymorphonuclear leukocytes per low power field    Rare Squamous epithelial cells per low power field    Few Gram positive cocci in pairs per oil power field    Rare Gram Variable Rods per oil power field        [RADIOLOGY STUDIES:]

## 2022-10-14 NOTE — PROGRESS NOTE ADULT - PROBLEM SELECTOR PLAN 5
Continue Trelegy Ellipta therapeutic interchange in hospital  Continue Singulair 10 mg qd  Encourage incentive spirometry  Pulm. consult follow up ongoing  Continue Spiriva, Singulair and Symbicort

## 2022-10-14 NOTE — PROGRESS NOTE ADULT - ASSESSMENT
81-year-old obese female past medical history previously diagnosed with DMII (no longer current), HTN, GERD, COPD not on home O2, pAfib s/p ablation currently on Coumadin, Aplastic Anemia, CKD, HFpEF admitted for pneumonia, course c/b pneumonia     HTN, PAFIB, Heart Failure   - Known pAfib, now NSR  - BP and HR controlled   - Continue home amiodarone 100 mg PO qd and Cardizem 30 mg BID for rate control   - Metoprolol recently d/c' d outpatient by Dr. Stout secondary to episodes of dizziness   - resume coumadin when cleared by surgical team     - No meaningful signs of volume overload on exam  - Echo (4/2021) showed EF 57%   - Strict I/Os, daily weights.  - Monitor and replete lytes, keep K>4, Mg>2.  - Will continue to follow.    Kunal Osuna NP  Nurse Practitioner- Cardiology   Spectra #[phone] 81-year-old obese female past medical history previously diagnosed with DMII (no longer current), HTN, GERD, COPD not on home O2, pAfib s/p ablation currently on Coumadin, Aplastic Anemia, CKD, HFpEF admitted for pneumonia, course c/b pneumonia     HTN, PAFIB, Heart Failure   - Known pAfib, now NSR  - BP and HR controlled   - Continue home amiodarone 100 mg PO qd and Cardizem 30 mg BID for rate control   - Metoprolol recently d/c' d outpatient by Dr. Stout secondary to episodes of dizziness   - resume coumadin when cleared by surgical team   - continue statin    - No meaningful signs of volume overload on exam  - Echo (4/2021) showed EF 57%   - Strict I/Os, daily weights.  - Monitor and replete lytes, keep K>4, Mg>2.  - Will continue to follow.    Kunal Osuna NP  Nurse Practitioner- Cardiology   Spectra #[phone]

## 2022-10-14 NOTE — PROGRESS NOTE ADULT - NSPROGADDITIONALINFOA_GEN_ALL_CORE
Stable for discharge home. Unable to leave today - family will transport her home in the morning.  Await today's INR- dose coumadin dose tonight.

## 2022-10-14 NOTE — PROGRESS NOTE ADULT - PROBLEM SELECTOR PLAN 1
Patient c/o abdominal pain, nausea, vomiting on 10/7 2/2 and found to have small bowel obstruction- now resolving; NGT out     Clinically improved with resolution of SBO  GI and surgery follow up ongoing

## 2022-10-14 NOTE — PROGRESS NOTE ADULT - PROBLEM SELECTOR PLAN 2
Admitted with fever and found to have RLL PNA with suspected sepsis on admission;   Clinically improved and has completed course of antibiotics.

## 2022-10-14 NOTE — PROGRESS NOTE ADULT - SUBJECTIVE AND OBJECTIVE BOX
[facility] -- Tori Jones,  Sai, Girish Jackson Savella, Goodger  Office # [phone]    Follow Up:  Afib    Subjective/Observations: No events overnight resting comfortably in chair.  No complaints of chest pain, dyspnea, or palpitations reported. No signs of orthopnea or PND. Denies abdominal pain, tolerating meals. On RA.       REVIEW OF SYSTEMS: All other review of systems is negative unless indicated above  PAST MEDICAL & SURGICAL HISTORY:  COPD (chronic obstructive pulmonary disease)      DM (diabetes mellitus)      PAF (paroxysmal atrial fibrillation)  s/p ablation      Hiatal hernia      GIB (gastrointestinal bleeding)      S/P hysterectomy      S/P foot surgery      S/P knee surgery      After cataract  bilateral eye cataract surgically removed      Closed right hip fracture  rigth hip ORIF july 19 2016      S/P IVC filter  nov 2016      S/P carpal tunnel release  Right 2008 &amp; 6/27/17        MEDICATIONS  (STANDING):  aMIOdarone    Tablet 100 milliGRAM(s) Oral daily  benzonatate 100 milliGRAM(s) Oral three times a day  budesonide  80 MICROgram(s)/formoterol 4.5 MICROgram(s) Inhaler 2 Puff(s) Inhalation two times a day  dextrose 5%. 1000 milliLiter(s) (100 mL/Hr) IV Continuous <Continuous>  dextrose 5%. 1000 milliLiter(s) (50 mL/Hr) IV Continuous <Continuous>  dextrose 50% Injectable 25 Gram(s) IV Push once  dextrose 50% Injectable 12.5 Gram(s) IV Push once  dextrose 50% Injectable 25 Gram(s) IV Push once  diltiazem    Tablet 30 milliGRAM(s) Oral two times a day  gabapentin 400 milliGRAM(s) Oral two times a day  glucagon  Injectable 1 milliGRAM(s) IntraMuscular once  insulin lispro (ADMELOG) corrective regimen sliding scale   SubCutaneous three times a day before meals  insulin lispro (ADMELOG) corrective regimen sliding scale   SubCutaneous at bedtime  montelukast 10 milliGRAM(s) Oral daily  pantoprazole  Injectable 40 milliGRAM(s) IV Push two times a day  simvastatin 10 milliGRAM(s) Oral at bedtime  tiotropium 18 MICROgram(s) Capsule 1 Capsule(s) Inhalation daily    MEDICATIONS  (PRN):  acetaminophen     Tablet .. 650 milliGRAM(s) Oral every 6 hours PRN Temp greater or equal to 38C (100.4F), Mild Pain (1 - 3)  dextrose Oral Gel 15 Gram(s) Oral once PRN Blood Glucose LESS THAN 70 milliGRAM(s)/deciliter  guaiFENesin Oral Liquid (Sugar-Free) 200 milliGRAM(s) Oral every 6 hours PRN Cough  ondansetron Injectable 4 milliGRAM(s) IV Push every 8 hours PRN Nausea and/or Vomiting    Allergies    No Known Allergies    Intolerances      Vital Signs Last 24 Hrs  T(C): 36.9 (14 Oct 2022 05:38), Max: 37.1 (13 Oct 2022 20:09)  T(F): 98.4 (14 Oct 2022 05:38), Max: 98.7 (13 Oct 2022 20:09)  HR: 64 (14 Oct 2022 05:38) (64 - 73)  BP: 127/75 (14 Oct 2022 05:38) (127/75 - 134/70)  BP(mean): --  RR: 19 (14 Oct 2022 05:38) (18 - 19)  SpO2: 95% (14 Oct 2022 05:38) (86% - 95%)    Parameters below as of 14 Oct 2022 05:38  Patient On (Oxygen Delivery Method): nasal cannula      I&O's Summary      TELE: off  PHYSICAL EXAM:  Constitutional: NAD, awake and alert, obese   HEENT: Moist Mucous Membranes, Anicteric  Pulmonary: Non-labored, breath sounds are clear bilaterally, No wheezing, crackles or rhonchi  Cardiovascular: Regular, S1 and S2 nl, No murmurs, rubs, gallops or clicks  Gastrointestinal: Bowel Sounds present, soft, nontender.   Lymph: No lymphadenopathy. No peripheral edema.  Skin: No visible rashes or ulcers.  Psych:  Mood & affect appropriate  LABS: All Labs Reviewed:                        9.8    2.88  )-----------( 357      ( 13 Oct 2022 07:35 )             31.0                         7.9    2.78  )-----------( 361      ( 12 Oct 2022 07:04 )             26.4     13 Oct 2022 07:35    141    |  106    |  18     ----------------------------<  101    4.4     |  30     |  0.90   12 Oct 2022 07:04    140    |  106    |  18     ----------------------------<  118    4.5     |  29     |  0.91     Ca    8.8        13 Oct 2022 07:35  Ca    7.8        12 Oct 2022 07:04  Mg     2.1       12 Oct 2022 07:04    TPro  6.1    /  Alb  2.4    /  TBili  0.7    /  DBili  x      /  AST  11     /  ALT  13     /  AlkPhos  59     13 Oct 2022 07:35  TPro  6.1    /  Alb  2.3    /  TBili  0.4    /  DBili  x      /  AST  8      /  ALT  12     /  AlkPhos  59     12 Oct 2022 07:04    PT/INR - ( 13 Oct 2022 07:35 )   PT: 32.5 sec;   INR: 2.75 ratio               12 Lead ECG:   Ventricular Rate 79 BPM    Atrial Rate 79 BPM    P-R Interval 136 ms    QRS Duration 78 ms    Q-T Interval 384 ms    QTC Calculation(Bazett) 440 ms    P Axis 85 degrees    R Axis -31 degrees    T Axis 72 degrees    Diagnosis Line Normal sinus rhythm  Left axis deviation  Confirmed by DAVID JACKSON ([id number]) on 10/5/2022 5:32:59 PM (10-05-22 @ 13:06)       EXAM:  ECHO TTE W/O CON COMP W/DOPPLR           PROCEDURE DATE:  07/17/2016      INTERPRETATION:  Ordering Physician: LIA MOLINA    Indication: Syncope    Study Quality: Technically difficult and limited     Height: 167 cm  Weight: 91 kg  BSA: 2.0 sq m  Blood Pressure: 104/70    MEASUREMENTS  IVS: 1.4cm  PWT: 1.4cm  LA: 4.4cm  AO: 3.6cm  LVIDd: 4.6cm  LVIDs: 2.4cm    FINDINGS  Left Ventricle: The endocardium is not well-visualized. Grossly, there   appears to be normal left ventricular systolic function.  Aortic Valve: Calcified trileaflet aortic valve with normal opening. No   significant aortic insufficiency.  Mitral Valve: Mitral annular calcification and calcified mitral valve   leaflets. Mild mitral insufficiency.  Tricuspid Valve: Not well visualized. Grossly normal. Mild tricuspid   insufficiency.  Pulmonic Valve: Not well visualized. Mild pulmonic insufficiency.  Left Atrium: Enlarged  Right Ventricle: Normal right ventricular size and systolic function.  Right Atrium: Enlarged  Diastolic function: Grade 1 diastolic dysfunction.                   DAVID JACKSON M.D., ATTENDING CARDIOLOGIST  This document has been electronically signed. Jul 18 2016  1:12P

## 2022-10-14 NOTE — PROGRESS NOTE ADULT - PROBLEM/PLAN-3
DISPLAY PLAN FREE TEXT
SBIRT referral

## 2022-10-14 NOTE — PROGRESS NOTE ADULT - PROBLEM SELECTOR PLAN 4
Epogen dependent; intermittently also requires RBC transfusion  Outpatient routine follow up with hematologist for continued care and monitoring in the community

## 2022-10-14 NOTE — PROGRESS NOTE ADULT - ASSESSMENT
81-year-old obese female past medical history previously diagnosed with DMII (no longer current), HTN, GERD, COPD not on home O2, pAfib s/p ablation currently on Coumadin, Aplastic Anemia, CKD, HFpEF admitted for pneumonia, on IV Rocephin and Azithro, course c/b SBO.        1. Small bowel obstruction- resolving  2. Bacterial PNA with suspected sepsis on admission in the ED  3. Metabolic encephalopathy due to acute illness- resolved  4. Hypoxia likely due to PNA with suspected acute hypoxic respiratory failure requiring supplemental oxygen  5. Generalized muscle weakness  6. CKD 3  7. Myelodysplastic syndrome - procrit and PRBC dependent   8. Chronic atrial fibrillation on coumadin  9. Hypalbuminemia nutritional supplements as tolerated

## 2022-10-14 NOTE — PROGRESS NOTE ADULT - PROBLEM SELECTOR PLAN 6
Resolved  - Pt with CKD (G3a), baseline GFR ~42, Cr 1.2-1.3 at baseline per chart review  - Avoid nephrotoxic medications  - Monitor renal indices

## 2022-10-14 NOTE — PROGRESS NOTE ADULT - PROBLEM SELECTOR PROBLEM 1
KIMBERLY (acute kidney injury)
Weakness
Abdominal pain
KIMBERLY (acute kidney injury)
Small bowel obstruction
KIMBERLY (acute kidney injury)
Weakness
SBO (small bowel obstruction)
SBO (small bowel obstruction)
Abdominal pain

## 2022-10-15 VITALS
OXYGEN SATURATION: 95 % | RESPIRATION RATE: 18 BRPM | DIASTOLIC BLOOD PRESSURE: 71 MMHG | TEMPERATURE: 98 F | HEART RATE: 62 BPM | SYSTOLIC BLOOD PRESSURE: 169 MMHG

## 2022-10-15 LAB
INR BLD: 1.38 RATIO — HIGH (ref 0.88–1.16)
PROTHROM AB SERPL-ACNC: 16.2 SEC — HIGH (ref 10.5–13.4)

## 2022-10-15 PROCEDURE — 83735 ASSAY OF MAGNESIUM: CPT

## 2022-10-15 PROCEDURE — 87077 CULTURE AEROBIC IDENTIFY: CPT

## 2022-10-15 PROCEDURE — 36430 TRANSFUSION BLD/BLD COMPNT: CPT

## 2022-10-15 PROCEDURE — 93005 ELECTROCARDIOGRAM TRACING: CPT

## 2022-10-15 PROCEDURE — 86901 BLOOD TYPING SEROLOGIC RH(D): CPT

## 2022-10-15 PROCEDURE — 97162 PT EVAL MOD COMPLEX 30 MIN: CPT

## 2022-10-15 PROCEDURE — 97530 THERAPEUTIC ACTIVITIES: CPT

## 2022-10-15 PROCEDURE — 87040 BLOOD CULTURE FOR BACTERIA: CPT

## 2022-10-15 PROCEDURE — 87449 NOS EACH ORGANISM AG IA: CPT

## 2022-10-15 PROCEDURE — 85027 COMPLETE CBC AUTOMATED: CPT

## 2022-10-15 PROCEDURE — 36415 COLL VENOUS BLD VENIPUNCTURE: CPT

## 2022-10-15 PROCEDURE — 97116 GAIT TRAINING THERAPY: CPT

## 2022-10-15 PROCEDURE — 87635 SARS-COV-2 COVID-19 AMP PRB: CPT

## 2022-10-15 PROCEDURE — 87641 MR-STAPH DNA AMP PROBE: CPT

## 2022-10-15 PROCEDURE — 74250 X-RAY XM SM INT 1CNTRST STD: CPT

## 2022-10-15 PROCEDURE — 74176 CT ABD & PELVIS W/O CONTRAST: CPT

## 2022-10-15 PROCEDURE — 99239 HOSP IP/OBS DSCHRG MGMT >30: CPT

## 2022-10-15 PROCEDURE — 74019 RADEX ABDOMEN 2 VIEWS: CPT

## 2022-10-15 PROCEDURE — 87186 SC STD MICRODIL/AGAR DIL: CPT

## 2022-10-15 PROCEDURE — U0005: CPT

## 2022-10-15 PROCEDURE — 96365 THER/PROPH/DIAG IV INF INIT: CPT

## 2022-10-15 PROCEDURE — 87070 CULTURE OTHR SPECIMN AEROBIC: CPT

## 2022-10-15 PROCEDURE — 85730 THROMBOPLASTIN TIME PARTIAL: CPT

## 2022-10-15 PROCEDURE — 99232 SBSQ HOSP IP/OBS MODERATE 35: CPT

## 2022-10-15 PROCEDURE — 87086 URINE CULTURE/COLONY COUNT: CPT

## 2022-10-15 PROCEDURE — 80048 BASIC METABOLIC PNL TOTAL CA: CPT

## 2022-10-15 PROCEDURE — 0225U NFCT DS DNA&RNA 21 SARSCOV2: CPT

## 2022-10-15 PROCEDURE — 71045 X-RAY EXAM CHEST 1 VIEW: CPT

## 2022-10-15 PROCEDURE — U0003: CPT

## 2022-10-15 PROCEDURE — 85610 PROTHROMBIN TIME: CPT

## 2022-10-15 PROCEDURE — 87640 STAPH A DNA AMP PROBE: CPT

## 2022-10-15 PROCEDURE — 84145 PROCALCITONIN (PCT): CPT

## 2022-10-15 PROCEDURE — 80053 COMPREHEN METABOLIC PANEL: CPT

## 2022-10-15 PROCEDURE — 87899 AGENT NOS ASSAY W/OPTIC: CPT

## 2022-10-15 PROCEDURE — 94640 AIRWAY INHALATION TREATMENT: CPT

## 2022-10-15 PROCEDURE — P9016: CPT

## 2022-10-15 PROCEDURE — 86850 RBC ANTIBODY SCREEN: CPT

## 2022-10-15 PROCEDURE — 81001 URINALYSIS AUTO W/SCOPE: CPT

## 2022-10-15 PROCEDURE — 82962 GLUCOSE BLOOD TEST: CPT

## 2022-10-15 PROCEDURE — 86900 BLOOD TYPING SEROLOGIC ABO: CPT

## 2022-10-15 PROCEDURE — 71250 CT THORAX DX C-: CPT

## 2022-10-15 PROCEDURE — 83036 HEMOGLOBIN GLYCOSYLATED A1C: CPT

## 2022-10-15 PROCEDURE — 99285 EMERGENCY DEPT VISIT HI MDM: CPT | Mod: 25

## 2022-10-15 PROCEDURE — 83615 LACTATE (LD) (LDH) ENZYME: CPT

## 2022-10-15 PROCEDURE — 85025 COMPLETE CBC W/AUTO DIFF WBC: CPT

## 2022-10-15 PROCEDURE — 86923 COMPATIBILITY TEST ELECTRIC: CPT

## 2022-10-15 PROCEDURE — 83605 ASSAY OF LACTIC ACID: CPT

## 2022-10-15 PROCEDURE — 74018 RADEX ABDOMEN 1 VIEW: CPT

## 2022-10-15 RX ADMIN — BUDESONIDE AND FORMOTEROL FUMARATE DIHYDRATE 2 PUFF(S): 160; 4.5 AEROSOL RESPIRATORY (INHALATION) at 07:30

## 2022-10-15 RX ADMIN — GABAPENTIN 400 MILLIGRAM(S): 400 CAPSULE ORAL at 05:48

## 2022-10-15 RX ADMIN — Medication 30 MILLIGRAM(S): at 05:47

## 2022-10-15 RX ADMIN — TIOTROPIUM BROMIDE 1 CAPSULE(S): 18 CAPSULE ORAL; RESPIRATORY (INHALATION) at 07:30

## 2022-10-15 RX ADMIN — PANTOPRAZOLE SODIUM 40 MILLIGRAM(S): 20 TABLET, DELAYED RELEASE ORAL at 05:47

## 2022-10-15 RX ADMIN — AMIODARONE HYDROCHLORIDE 100 MILLIGRAM(S): 400 TABLET ORAL at 05:48

## 2022-10-15 RX ADMIN — Medication 100 MILLIGRAM(S): at 05:47

## 2022-10-15 NOTE — PROGRESS NOTE ADULT - SUBJECTIVE AND OBJECTIVE BOX
[facility]  Berhane Maldonado PA-C  [address] [place] [address]  [phone]      INTERVAL HPI/OVERNIGHT EVENTS:  Pt s/e  Reports no abdominal pain  +flatus and BM  Tolerating diet  No further GI complaints    MEDICATIONS  (STANDING):  aMIOdarone    Tablet 100 milliGRAM(s) Oral daily  benzonatate 100 milliGRAM(s) Oral three times a day  budesonide  80 MICROgram(s)/formoterol 4.5 MICROgram(s) Inhaler 2 Puff(s) Inhalation two times a day  dextrose 5%. 1000 milliLiter(s) (50 mL/Hr) IV Continuous <Continuous>  dextrose 5%. 1000 milliLiter(s) (100 mL/Hr) IV Continuous <Continuous>  dextrose 50% Injectable 25 Gram(s) IV Push once  dextrose 50% Injectable 12.5 Gram(s) IV Push once  dextrose 50% Injectable 25 Gram(s) IV Push once  diltiazem    Tablet 30 milliGRAM(s) Oral two times a day  gabapentin 400 milliGRAM(s) Oral two times a day  glucagon  Injectable 1 milliGRAM(s) IntraMuscular once  insulin lispro (ADMELOG) corrective regimen sliding scale   SubCutaneous three times a day before meals  insulin lispro (ADMELOG) corrective regimen sliding scale   SubCutaneous at bedtime  montelukast 10 milliGRAM(s) Oral daily  pantoprazole  Injectable 40 milliGRAM(s) IV Push two times a day  simvastatin 10 milliGRAM(s) Oral at bedtime  tiotropium 18 MICROgram(s) Capsule 1 Capsule(s) Inhalation daily    MEDICATIONS  (PRN):  acetaminophen     Tablet .. 650 milliGRAM(s) Oral every 6 hours PRN Temp greater or equal to 38C (100.4F), Mild Pain (1 - 3)  dextrose Oral Gel 15 Gram(s) Oral once PRN Blood Glucose LESS THAN 70 milliGRAM(s)/deciliter  guaiFENesin Oral Liquid (Sugar-Free) 200 milliGRAM(s) Oral every 6 hours PRN Cough  ondansetron Injectable 4 milliGRAM(s) IV Push every 8 hours PRN Nausea and/or Vomiting      Allergies    No Known Allergies    PHYSICAL EXAM:   Vital Signs Last 24 Hrs  T(C): 36.6 (15 Oct 2022 05:20), Max: 36.9 (14 Oct 2022 13:17)  T(F): 97.9 (15 Oct 2022 05:20), Max: 98.5 (14 Oct 2022 13:17)  HR: 62 (15 Oct 2022 05:20) (62 - 71)  BP: 169/71 (15 Oct 2022 05:20) (144/71 - 169/71)  BP(mean): --  RR: 18 (15 Oct 2022 05:20) (18 - 18)  SpO2: 95% (15 Oct 2022 05:20) (94% - 98%)    Parameters below as of 15 Oct 2022 05:20  Patient On (Oxygen Delivery Method): nasal cannula    Daily     Daily Weight in k.6 (13 Oct 2022 04:30)    GENERAL:  Appears stated age  ABDOMEN:  Soft, non-tender, non-distended  NEURO:  Alert      LABS:                          9.8    2.88  )-----------( 357      ( 13 Oct 2022 07:35 )             31.0     10-13    141  |  106  |  18  ----------------------------<  101<H>  4.4   |  30  |  0.90    Ca    8.8      13 Oct 2022 07:35  Mg     2.1     10-12    TPro  6.1  /  Alb  2.4<L>  /  TBili  0.7  /  DBili  x   /  AST  11<L>  /  ALT  13  /  AlkPhos  59  10-13    PT/INR - ( 13 Oct 2022 07:35 )   PT: 32.5 sec;   INR: 2.75 ratio

## 2022-10-15 NOTE — PROGRESS NOTE ADULT - SUBJECTIVE AND OBJECTIVE BOX
[facility] -- Tori Jones,  Sai, Girish Jackson Savella, Goodger  Office # [phone]    Follow Up:  Afib    Subjective/Observations: No events overnight resting comfortably in chair.  No complaints of chest pain, dyspnea, or palpitations reported. No signs of orthopnea or PND. Denies abdominal pain, tolerating meals. On RA.       REVIEW OF SYSTEMS: All other review of systems is negative unless indicated above  PAST MEDICAL & SURGICAL HISTORY:  COPD (chronic obstructive pulmonary disease)      DM (diabetes mellitus)      PAF (paroxysmal atrial fibrillation)  s/p ablation      Hiatal hernia      GIB (gastrointestinal bleeding)      S/P hysterectomy      S/P foot surgery      S/P knee surgery      After cataract  bilateral eye cataract surgically removed      Closed right hip fracture  rigth hip ORIF july 19 2016      S/P IVC filter  nov 2016      S/P carpal tunnel release  Right 2008 &amp; 6/27/17        MEDICATIONS  (STANDING):  aMIOdarone    Tablet 100 milliGRAM(s) Oral daily  benzonatate 100 milliGRAM(s) Oral three times a day  budesonide  80 MICROgram(s)/formoterol 4.5 MICROgram(s) Inhaler 2 Puff(s) Inhalation two times a day  dextrose 5%. 1000 milliLiter(s) (100 mL/Hr) IV Continuous <Continuous>  dextrose 5%. 1000 milliLiter(s) (50 mL/Hr) IV Continuous <Continuous>  dextrose 50% Injectable 25 Gram(s) IV Push once  dextrose 50% Injectable 12.5 Gram(s) IV Push once  dextrose 50% Injectable 25 Gram(s) IV Push once  diltiazem    Tablet 30 milliGRAM(s) Oral two times a day  epoetin maritza-epbx (RETACRIT) Injectable 04831 Unit(s) SubCutaneous once  gabapentin 400 milliGRAM(s) Oral two times a day  glucagon  Injectable 1 milliGRAM(s) IntraMuscular once  insulin lispro (ADMELOG) corrective regimen sliding scale   SubCutaneous three times a day before meals  insulin lispro (ADMELOG) corrective regimen sliding scale   SubCutaneous at bedtime  montelukast 10 milliGRAM(s) Oral daily  pantoprazole  Injectable 40 milliGRAM(s) IV Push two times a day  simvastatin 10 milliGRAM(s) Oral at bedtime  tiotropium 18 MICROgram(s) Capsule 1 Capsule(s) Inhalation daily    MEDICATIONS  (PRN):  acetaminophen     Tablet .. 650 milliGRAM(s) Oral every 6 hours PRN Temp greater or equal to 38C (100.4F), Mild Pain (1 - 3)  dextrose Oral Gel 15 Gram(s) Oral once PRN Blood Glucose LESS THAN 70 milliGRAM(s)/deciliter  guaiFENesin Oral Liquid (Sugar-Free) 200 milliGRAM(s) Oral every 6 hours PRN Cough  ondansetron Injectable 4 milliGRAM(s) IV Push every 8 hours PRN Nausea and/or Vomiting    Allergies    No Known Allergies    Intolerances      Vital Signs Last 24 Hrs  T(C): 36.6 (15 Oct 2022 05:20), Max: 36.7 (14 Oct 2022 20:19)  T(F): 97.9 (15 Oct 2022 05:20), Max: 98 (14 Oct 2022 20:19)  HR: 62 (15 Oct 2022 05:20) (62 - 71)  BP: 169/71 (15 Oct 2022 05:20) (147/69 - 169/71)  BP(mean): --  RR: 18 (15 Oct 2022 05:20) (18 - 18)  SpO2: 95% (15 Oct 2022 05:20) (95% - 98%)    Parameters below as of 15 Oct 2022 05:20  Patient On (Oxygen Delivery Method): nasal cannula      I&O's Summary    14 Oct 2022 07:01  -  15 Oct 2022 07:00  --------------------------------------------------------  IN: 0 mL / OUT: 600 mL / NET: -600 mL            TELE: off  PHYSICAL EXAM:  Constitutional: NAD, awake and alert, obese   HEENT: Moist Mucous Membranes, Anicteric  Pulmonary: Non-labored, breath sounds are clear bilaterally, No wheezing, crackles or rhonchi  Cardiovascular: Regular, S1 and S2 nl, No murmurs, rubs, gallops or clicks  Gastrointestinal: Bowel Sounds present, soft, nontender.   Lymph: No lymphadenopathy. No peripheral edema.  Skin: No visible rashes or ulcers.  Psych:  Mood & affect appropriate    LABS: All Labs Reviewed:                        9.8    2.88  )-----------( 357      ( 13 Oct 2022 07:35 )             31.0     13 Oct 2022 07:35    141    |  106    |  18     ----------------------------<  101    4.4     |  30     |  0.90     Ca    8.8        13 Oct 2022 07:35    TPro  6.1    /  Alb  2.4    /  TBili  0.7    /  DBili  x      /  AST  11     /  ALT  13     /  AlkPhos  59     13 Oct 2022 07:35    PT/INR - ( 15 Oct 2022 06:30 )   PT: 16.2 sec;   INR: 1.38 ratio               12 Lead ECG:   Ventricular Rate 79 BPM    Atrial Rate 79 BPM    P-R Interval 136 ms    QRS Duration 78 ms    Q-T Interval 384 ms    QTC Calculation(Bazett) 440 ms    P Axis 85 degrees    R Axis -31 degrees    T Axis 72 degrees    Diagnosis Line Normal sinus rhythm  Left axis deviation  Confirmed by DAVID JACKSON ([id number]) on 10/5/2022 5:32:59 PM (10-05-22 @ 13:06)       EXAM:  ECHO TTE W/O CON COMP W/DOPPLR           PROCEDURE DATE:  07/17/2016      INTERPRETATION:  Ordering Physician: LIA MOLINA    Indication: Syncope    Study Quality: Technically difficult and limited     Height: 167 cm  Weight: 91 kg  BSA: 2.0 sq m  Blood Pressure: 104/70    MEASUREMENTS  IVS: 1.4cm  PWT: 1.4cm  LA: 4.4cm  AO: 3.6cm  LVIDd: 4.6cm  LVIDs: 2.4cm    FINDINGS  Left Ventricle: The endocardium is not well-visualized. Grossly, there   appears to be normal left ventricular systolic function.  Aortic Valve: Calcified trileaflet aortic valve with normal opening. No   significant aortic insufficiency.  Mitral Valve: Mitral annular calcification and calcified mitral valve   leaflets. Mild mitral insufficiency.  Tricuspid Valve: Not well visualized. Grossly normal. Mild tricuspid   insufficiency.  Pulmonic Valve: Not well visualized. Mild pulmonic insufficiency.  Left Atrium: Enlarged  Right Ventricle: Normal right ventricular size and systolic function.  Right Atrium: Enlarged  Diastolic function: Grade 1 diastolic dysfunction.                   DAVID JACKSON M.D., ATTENDING CARDIOLOGIST  This document has been electronically signed. Jul 18 2016  1:12P                        [facility] -- Tori Jones,  Sai, Girish Jackson Savella, Goodger  Office # [phone]    Follow Up:  Afib    Subjective/Observations: No events overnight resting comfortably in chair.  No complaints of chest pain, dyspnea, or palpitations reported. No signs of orthopnea or PND. On RA.       REVIEW OF SYSTEMS: All other review of systems is negative unless indicated above  PAST MEDICAL & SURGICAL HISTORY:  COPD (chronic obstructive pulmonary disease)      DM (diabetes mellitus)      PAF (paroxysmal atrial fibrillation)  s/p ablation      Hiatal hernia      GIB (gastrointestinal bleeding)      S/P hysterectomy      S/P foot surgery      S/P knee surgery      After cataract  bilateral eye cataract surgically removed      Closed right hip fracture  rigth hip ORIF july 19 2016      S/P IVC filter  nov 2016      S/P carpal tunnel release  Right 2008 &amp; 6/27/17        MEDICATIONS  (STANDING):  aMIOdarone    Tablet 100 milliGRAM(s) Oral daily  benzonatate 100 milliGRAM(s) Oral three times a day  budesonide  80 MICROgram(s)/formoterol 4.5 MICROgram(s) Inhaler 2 Puff(s) Inhalation two times a day  dextrose 5%. 1000 milliLiter(s) (100 mL/Hr) IV Continuous <Continuous>  dextrose 5%. 1000 milliLiter(s) (50 mL/Hr) IV Continuous <Continuous>  dextrose 50% Injectable 25 Gram(s) IV Push once  dextrose 50% Injectable 12.5 Gram(s) IV Push once  dextrose 50% Injectable 25 Gram(s) IV Push once  diltiazem    Tablet 30 milliGRAM(s) Oral two times a day  epoetin maritza-epbx (RETACRIT) Injectable 14662 Unit(s) SubCutaneous once  gabapentin 400 milliGRAM(s) Oral two times a day  glucagon  Injectable 1 milliGRAM(s) IntraMuscular once  insulin lispro (ADMELOG) corrective regimen sliding scale   SubCutaneous three times a day before meals  insulin lispro (ADMELOG) corrective regimen sliding scale   SubCutaneous at bedtime  montelukast 10 milliGRAM(s) Oral daily  pantoprazole  Injectable 40 milliGRAM(s) IV Push two times a day  simvastatin 10 milliGRAM(s) Oral at bedtime  tiotropium 18 MICROgram(s) Capsule 1 Capsule(s) Inhalation daily    MEDICATIONS  (PRN):  acetaminophen     Tablet .. 650 milliGRAM(s) Oral every 6 hours PRN Temp greater or equal to 38C (100.4F), Mild Pain (1 - 3)  dextrose Oral Gel 15 Gram(s) Oral once PRN Blood Glucose LESS THAN 70 milliGRAM(s)/deciliter  guaiFENesin Oral Liquid (Sugar-Free) 200 milliGRAM(s) Oral every 6 hours PRN Cough  ondansetron Injectable 4 milliGRAM(s) IV Push every 8 hours PRN Nausea and/or Vomiting    Allergies    No Known Allergies    Intolerances      Vital Signs Last 24 Hrs  T(C): 36.6 (15 Oct 2022 05:20), Max: 36.7 (14 Oct 2022 20:19)  T(F): 97.9 (15 Oct 2022 05:20), Max: 98 (14 Oct 2022 20:19)  HR: 62 (15 Oct 2022 05:20) (62 - 71)  BP: 169/71 (15 Oct 2022 05:20) (147/69 - 169/71)  BP(mean): --  RR: 18 (15 Oct 2022 05:20) (18 - 18)  SpO2: 95% (15 Oct 2022 05:20) (95% - 98%)    Parameters below as of 15 Oct 2022 05:20  Patient On (Oxygen Delivery Method): nasal cannula      I&O's Summary    14 Oct 2022 07:01  -  15 Oct 2022 07:00  --------------------------------------------------------  IN: 0 mL / OUT: 600 mL / NET: -600 mL            TELE: off  PHYSICAL EXAM:  Constitutional: NAD, awake and alert, obese   HEENT: Moist Mucous Membranes, Anicteric  Pulmonary: Non-labored, breath sounds are clear bilaterally, No wheezing, crackles or rhonchi  Cardiovascular: Regular, S1 and S2 nl, No murmurs, rubs, gallops or clicks  Gastrointestinal: Bowel Sounds present, soft, nontender.   Lymph: No lymphadenopathy. No peripheral edema.  Skin: No visible rashes or ulcers.  Psych:  Mood & affect appropriate    LABS: All Labs Reviewed:                        9.8    2.88  )-----------( 357      ( 13 Oct 2022 07:35 )             31.0     13 Oct 2022 07:35    141    |  106    |  18     ----------------------------<  101    4.4     |  30     |  0.90     Ca    8.8        13 Oct 2022 07:35    TPro  6.1    /  Alb  2.4    /  TBili  0.7    /  DBili  x      /  AST  11     /  ALT  13     /  AlkPhos  59     13 Oct 2022 07:35    PT/INR - ( 15 Oct 2022 06:30 )   PT: 16.2 sec;   INR: 1.38 ratio               12 Lead ECG:   Ventricular Rate 79 BPM    Atrial Rate 79 BPM    P-R Interval 136 ms    QRS Duration 78 ms    Q-T Interval 384 ms    QTC Calculation(Bazett) 440 ms    P Axis 85 degrees    R Axis -31 degrees    T Axis 72 degrees    Diagnosis Line Normal sinus rhythm  Left axis deviation  Confirmed by DAVID JACKSON ([id number]) on 10/5/2022 5:32:59 PM (10-05-22 @ 13:06)       EXAM:  ECHO TTE W/O CON COMP W/DOPPLR           PROCEDURE DATE:  07/17/2016      INTERPRETATION:  Ordering Physician: LIA MOLINA    Indication: Syncope    Study Quality: Technically difficult and limited     Height: 167 cm  Weight: 91 kg  BSA: 2.0 sq m  Blood Pressure: 104/70    MEASUREMENTS  IVS: 1.4cm  PWT: 1.4cm  LA: 4.4cm  AO: 3.6cm  LVIDd: 4.6cm  LVIDs: 2.4cm    FINDINGS  Left Ventricle: The endocardium is not well-visualized. Grossly, there   appears to be normal left ventricular systolic function.  Aortic Valve: Calcified trileaflet aortic valve with normal opening. No   significant aortic insufficiency.  Mitral Valve: Mitral annular calcification and calcified mitral valve   leaflets. Mild mitral insufficiency.  Tricuspid Valve: Not well visualized. Grossly normal. Mild tricuspid   insufficiency.  Pulmonic Valve: Not well visualized. Mild pulmonic insufficiency.  Left Atrium: Enlarged  Right Ventricle: Normal right ventricular size and systolic function.  Right Atrium: Enlarged  Diastolic function: Grade 1 diastolic dysfunction.                   DAVID JACKSON M.D., ATTENDING CARDIOLOGIST  This document has been electronically signed. Jul 18 2016  1:12P

## 2022-10-15 NOTE — PROGRESS NOTE ADULT - ASSESSMENT
81-year-old obese female past medical history previously diagnosed with DMII (no longer current), HTN, GERD, COPD not on home O2, pAfib s/p ablation currently on Coumadin, Aplastic Anemia, CKD, HFpEF admitted for pneumonia, course c/b pneumonia     HTN, PAFIB, Heart Failure   - Known pAfib, now NSR  - BP and HR controlled   - Continue home amiodarone 100 mg PO qd and Cardizem 30 mg BID for rate control   - Metoprolol recently d/c' d outpatient by Dr. Stout secondary to episodes of dizziness   - monitor PT/INR, goal INR 2-3, continue coumadin   - continue statin    - No meaningful signs of volume overload on exam  - Echo (4/2021) showed EF 57%   - Strict I/Os, daily weights.  - Monitor and replete lytes, keep K>4, Mg>2.  - Will continue to follow.    Kunal Osuna NP  Nurse Practitioner- Cardiology   Spectra #[phone]

## 2022-10-15 NOTE — PROGRESS NOTE ADULT - REASON FOR ADMISSION
Pneumonia

## 2022-10-15 NOTE — PROGRESS NOTE ADULT - ASSESSMENT
SBO  +BM  diet as tolerated    surgical eval noted  conservative management for now  dc planning    I reviewed the overnight course of events on the unit, re-confirming the patient history. I discussed the care with the patient and their family  Differential diagnosis and plan of care discussed with patient after the evaluation  35 minutes spent on total encounter of which more than fifty percent of the encounter was spent counseling and/or coordinating care by the attending physician.  Advanced care planning was discussed with patient and family.  Advanced care planning forms were reviewed and discussed.  Risks, benefits and alternatives of gastroenterologic procedures were discussed in detail and all questions were answered.

## 2022-10-15 NOTE — PROGRESS NOTE ADULT - NS ATTEND AMEND GEN_ALL_CORE FT
I have personally seen and examined the patient in detail.  I have spoken to the provider regarding the assessment and plan of care.  I have made changes to the note accordingly.
No signs of significant ischemia or volume overload. cont current care. Further cardiac workup will depend on clinical course. resume coumadin if able.
paf now sr  metoprolol recently dc  cont amio and dilt  warfarin on hold for sbo  mgmt per gi.
paf now sr  metoprolol recently dc  cont amio and dilt  warfarin on hold for sbo  ngt, mgmt per gi
No evidence of active ischemia or volume overload. to follow while admitted.
No signs of significant ischemia or volume overload. cont current care. Further cardiac workup will depend on clinical course.
paf ablated with recurrence  on amio and dilt, and in sr  cont warfarin goal inr 2-3  supportive care with abx for pna  no ischemia or vol ol .

## 2022-10-15 NOTE — PROGRESS NOTE ADULT - PROVIDER SPECIALTY LIST ADULT
Cardiology
Cardiology
Gastroenterology
Nephrology
Pulmonology
Pulmonology
Surgery
Cardiology
Cardiology
Gastroenterology
Gastroenterology
Heme/Onc
Hospitalist
Infectious Disease
Infectious Disease
Internal Medicine
Nephrology
Pulmonology
Surgery
Cardiology
Gastroenterology
Gastroenterology
Heme/Onc
Infectious Disease
Infectious Disease
Nephrology
Pulmonology
Pulmonology
Cardiology
Gastroenterology
Heme/Onc
Nephrology
Surgery
Heme/Onc
Infectious Disease
Heme/Onc
Hospitalist
Hospitalist
Internal Medicine
Hospitalist

## 2022-10-16 LAB
-  AMIKACIN: SIGNIFICANT CHANGE UP
-  AZTREONAM: SIGNIFICANT CHANGE UP
-  CEFEPIME: SIGNIFICANT CHANGE UP
-  CEFTAZIDIME: SIGNIFICANT CHANGE UP
-  CIPROFLOXACIN: SIGNIFICANT CHANGE UP
-  GENTAMICIN: SIGNIFICANT CHANGE UP
-  IMIPENEM: SIGNIFICANT CHANGE UP
-  LEVOFLOXACIN: SIGNIFICANT CHANGE UP
-  MEROPENEM: SIGNIFICANT CHANGE UP
-  PIPERACILLIN/TAZOBACTAM: SIGNIFICANT CHANGE UP
-  TOBRAMYCIN: SIGNIFICANT CHANGE UP
CULTURE RESULTS: SIGNIFICANT CHANGE UP
METHOD TYPE: SIGNIFICANT CHANGE UP
ORGANISM # SPEC MICROSCOPIC CNT: SIGNIFICANT CHANGE UP
ORGANISM # SPEC MICROSCOPIC CNT: SIGNIFICANT CHANGE UP
SPECIMEN SOURCE: SIGNIFICANT CHANGE UP

## 2022-10-18 ENCOUNTER — APPOINTMENT (OUTPATIENT)
Dept: CARE COORDINATION | Facility: HOME HEALTH | Age: 82
End: 2022-10-18

## 2022-10-18 ENCOUNTER — APPOINTMENT (OUTPATIENT)
Dept: CARDIOLOGY | Facility: CLINIC | Age: 82
End: 2022-10-18

## 2022-10-18 VITALS
SYSTOLIC BLOOD PRESSURE: 132 MMHG | OXYGEN SATURATION: 96 % | RESPIRATION RATE: 16 BRPM | DIASTOLIC BLOOD PRESSURE: 64 MMHG | HEART RATE: 60 BPM

## 2022-10-18 DIAGNOSIS — J44.9 CHRONIC OBSTRUCTIVE PULMONARY DISEASE, UNSPECIFIED: ICD-10-CM

## 2022-10-18 LAB
INR PPP: 1.6 RATIO
QUALITY CONTROL: YES

## 2022-10-18 PROCEDURE — 99349 HOME/RES VST EST MOD MDM 40: CPT

## 2022-10-18 PROCEDURE — 93793 ANTICOAG MGMT PT WARFARIN: CPT

## 2022-10-18 PROCEDURE — 85610 PROTHROMBIN TIME: CPT | Mod: QW

## 2022-10-18 RX ORDER — METHOCARBAMOL 500 MG/1
500 TABLET, FILM COATED ORAL
Qty: 10 | Refills: 0 | Status: COMPLETED | COMMUNITY
Start: 2018-05-07 | End: 2022-10-18

## 2022-10-18 RX ORDER — HYDROCODONE BITARTRATE AND ACETAMINOPHEN 5; 325 MG/1; MG/1
5-325 TABLET ORAL
Qty: 20 | Refills: 0 | Status: DISCONTINUED | COMMUNITY
Start: 2017-06-26 | End: 2022-10-18

## 2022-10-18 RX ORDER — ASCORBIC ACID 500 MG
500 TABLET ORAL
Refills: 0 | Status: DISCONTINUED | COMMUNITY
End: 2022-10-18

## 2022-10-18 RX ORDER — FLUTICASONE PROPIONATE 0.5 MG/G
0.05 CREAM TOPICAL
Qty: 60 | Refills: 0 | Status: DISCONTINUED | COMMUNITY
Start: 2022-01-10 | End: 2022-10-18

## 2022-10-18 RX ORDER — TOBRAMYCIN AND DEXAMETHASONE 3; 1 MG/ML; MG/ML
0.3-0.1 SUSPENSION/ DROPS OPHTHALMIC
Qty: 5 | Refills: 0 | Status: DISCONTINUED | COMMUNITY
Start: 2017-03-15 | End: 2022-10-18

## 2022-10-18 RX ORDER — METHYLPREDNISOLONE 4 MG/1
4 TABLET ORAL
Qty: 21 | Refills: 0 | Status: DISCONTINUED | COMMUNITY
Start: 2022-02-15 | End: 2022-10-18

## 2022-10-18 RX ORDER — FLUTICASONE FUROATE, UMECLIDINIUM BROMIDE AND VILANTEROL TRIFENATATE 100; 62.5; 25 UG/1; UG/1; UG/1
100-62.5-25 POWDER RESPIRATORY (INHALATION)
Refills: 0 | Status: ACTIVE | COMMUNITY
Start: 2019-02-05

## 2022-10-18 RX ORDER — FUROSEMIDE 20 MG/1
20 TABLET ORAL DAILY
Qty: 30 | Refills: 3 | Status: COMPLETED | COMMUNITY
Start: 2022-05-17 | End: 2022-10-18

## 2022-10-18 RX ORDER — DICYCLOMINE HYDROCHLORIDE 10 MG/1
10 CAPSULE ORAL 4 TIMES DAILY
Refills: 0 | Status: DISCONTINUED | COMMUNITY
Start: 2022-05-12 | End: 2022-10-18

## 2022-10-18 RX ORDER — CICLOPIROX OLAMINE 7.7 MG/G
0.77 CREAM TOPICAL
Qty: 30 | Refills: 0 | Status: DISCONTINUED | COMMUNITY
Start: 2020-11-04 | End: 2022-10-18

## 2022-10-18 RX ORDER — POLYETHYLENE GLYCOL-3350 AND ELECTROLYTES 236; 6.74; 5.86; 2.97; 22.74 G/274.31G; G/274.31G; G/274.31G; G/274.31G; G/274.31G
236 POWDER, FOR SOLUTION ORAL
Qty: 4000 | Refills: 0 | Status: DISCONTINUED | COMMUNITY
Start: 2018-04-10 | End: 2022-10-18

## 2022-10-18 RX ORDER — IPRATROPIUM BROMIDE 42 UG/1
0.06 SPRAY NASAL
Qty: 15 | Refills: 0 | Status: DISCONTINUED | COMMUNITY
Start: 2020-12-22 | End: 2022-10-18

## 2022-10-18 RX ORDER — DESONIDE 0.5 MG/ML
0.05 LOTION TOPICAL
Qty: 118 | Refills: 0 | Status: DISCONTINUED | COMMUNITY
Start: 2017-05-12 | End: 2022-10-18

## 2022-10-18 NOTE — HISTORY OF PRESENT ILLNESS
[Post-hospitalization from ___ Hospital] : Post-hospitalization from [unfilled] Hospital [Admitted on: ___] : The patient was admitted on [unfilled] [Discharged on ___] : discharged on [unfilled] [FreeTextEntry2] : HOSPITAL COURSE: Patient was admitted for management of RLL pneumonia. ID (Dr. Donnelly) was consulted. Patient was treated with course of IV Ceftriaxone and Azithromycin. Sputum culture with normal respiratory marilee. Legionella U ag, MRSA PCR negative. Hospital course was complicated by SBO. GI (Dr. Marquis) and surgery followed patient. Patient was made NPO and NGT was placed. Diet was advanced as tolerated. Cardio ([facility]) was consulted for afib, HTN and HF. Home Coumadin was held while patient was NPO and resumed after resolution of SBO. Given anemia, heme/onc (Dr. Case) was consulted. Patient received 1 unit pRBC during hospital course. Patient is MIRIAM dependent and does require occasional transfusions, however MIRIAM was held during hospitalization. Nephro (Dr. Chisholm) was consulted for prerenal azotemia, CKD 3a. Renal indices improved s/p IVF and were stable prior to discharge. Supplemental oxygen was provided on discharge. \par \par Upon examination A&O x 3 NAD. Denies CP, SOB, headache, dizziness, pain. She reports having some diarrhea yesterday and overnight, none since this morning. She has appt with GI Dr. Arreaga tomorrow. Advised bland diet, plenty of fluids. + COPD with h/o aplastic anemia requiring 2u PRBC during hospitalization. She saw Heme ONc this morning, Hb was 9.7 and she received procrit. Told by ID she is high risk for recurrence of PNA. She is s/p rochephin/azithro. New O2 on discharge. She will follow up Pulm Dr. Gallegos in 2 weeks with hopes of weaning the O2. + P afib rate controlled on coumadin. BP controlled on current meds. Lives with her . [facility] services in home

## 2022-10-18 NOTE — PLAN
[FreeTextEntry1] : \par A/P:\par # RLL pneumonia. \par - Admitted with fever and found to have RLL PNA with suspected sepsis on admission; \par Clinically improved and has completed course of rocephin/azithro\par \par # COPD, mild. \par - Continue Trelegy \par - Continue Singulair 10 mg qd\par - call for any increased SOB/wheze/cough/hypoxia\par - management per pulm\par \par # HTN, PAFIB, Heart Failure \par - Known pAfib, now NSR\par - BP and HR controlled \par - Continue home amiodarone 100 mg PO qd and Cardizem 30 mg BID for rate control \par - Metoprolol recently d/c' d outpatient by Dr. Stout secondary to episodes of dizziness \par - monitor PT/INR, goal INR 2-3, continue coumadin \par - continue statin\par \par # Other myelodysplastic syndromes. \par - Epogen dependent; intermittently also requires RBC transfusion\par - Outpatient routine follow up with hematologist for continued care and monitoring in the community.\par \par \par \par \par

## 2022-10-19 ENCOUNTER — NON-APPOINTMENT (OUTPATIENT)
Age: 82
End: 2022-10-19

## 2022-10-25 ENCOUNTER — APPOINTMENT (OUTPATIENT)
Dept: CARDIOLOGY | Facility: CLINIC | Age: 82
End: 2022-10-25

## 2022-10-25 LAB
INR PPP: 2.3 RATIO
QUALITY CONTROL: YES

## 2022-10-25 PROCEDURE — 93793 ANTICOAG MGMT PT WARFARIN: CPT

## 2022-10-25 PROCEDURE — 85610 PROTHROMBIN TIME: CPT | Mod: QW

## 2022-10-28 ENCOUNTER — APPOINTMENT (OUTPATIENT)
Dept: INTERNAL MEDICINE | Facility: CLINIC | Age: 82
End: 2022-10-28

## 2022-10-28 VITALS
RESPIRATION RATE: 14 BRPM | BODY MASS INDEX: 34.55 KG/M2 | TEMPERATURE: 97.6 F | HEIGHT: 63 IN | DIASTOLIC BLOOD PRESSURE: 74 MMHG | HEART RATE: 68 BPM | OXYGEN SATURATION: 98 % | WEIGHT: 195 LBS | SYSTOLIC BLOOD PRESSURE: 118 MMHG

## 2022-10-28 DIAGNOSIS — N18.9 CHRONIC KIDNEY DISEASE, UNSPECIFIED: ICD-10-CM

## 2022-10-28 DIAGNOSIS — J18.9 PNEUMONIA, UNSPECIFIED ORGANISM: ICD-10-CM

## 2022-10-28 DIAGNOSIS — Z87.01 PERSONAL HISTORY OF PNEUMONIA (RECURRENT): ICD-10-CM

## 2022-10-28 PROCEDURE — 99495 TRANSJ CARE MGMT MOD F2F 14D: CPT

## 2022-10-28 NOTE — HEALTH RISK ASSESSMENT
[Never] : Never [Never (0 pts)] : Never (0 points) [No] : In the past 12 months have you used drugs other than those required for medical reasons? No [No falls in past year] : Patient reported no falls in the past year [0] : 2) Feeling down, depressed, or hopeless: Not at all (0) [PHQ-2 Negative - No further assessment needed] : PHQ-2 Negative - No further assessment needed [[id number]] : 0

## 2022-10-28 NOTE — PHYSICAL EXAM
[No Acute Distress] : no acute distress [Well Nourished] : well nourished [Well Developed] : well developed [Well-Appearing] : well-appearing [Normal Voice/Communication] : normal voice/communication [Normal Sclera/Conjunctiva] : normal sclera/conjunctiva [PERRL] : pupils equal round and reactive to light [EOMI] : extraocular movements intact [Normal Outer Ear/Nose] : the outer ears and nose were normal in appearance [Normal Oropharynx] : the oropharynx was normal [No JVD] : no jugular venous distention [No Lymphadenopathy] : no lymphadenopathy [Supple] : supple [Thyroid Normal, No Nodules] : the thyroid was normal and there were no nodules present [No Respiratory Distress] : no respiratory distress  [No Accessory Muscle Use] : no accessory muscle use [Normal Rate] : normal rate  [Normal S1, S2] : normal S1 and S2 [Regular Rhythm] : with a regular rhythm [No Murmur] : no murmur heard [No Carotid Bruits] : no carotid bruits [No Abdominal Bruit] : a ~M bruit was not heard ~T in the abdomen [No Varicosities] : no varicosities [Pedal Pulses Present] : the pedal pulses are present [No Edema] : there was no peripheral edema [No Palpable Aorta] : no palpable aorta [No Extremity Clubbing/Cyanosis] : no extremity clubbing/cyanosis [Soft] : abdomen soft [Non Tender] : non-tender [Non-distended] : non-distended [No Masses] : no abdominal mass palpated [No HSM] : no HSM [Normal Bowel Sounds] : normal bowel sounds [Normal Supraclavicular Nodes] : no supraclavicular lymphadenopathy [Normal Posterior Cervical Nodes] : no posterior cervical lymphadenopathy [Normal Anterior Cervical Nodes] : no anterior cervical lymphadenopathy [No CVA Tenderness] : no CVA  tenderness [No Spinal Tenderness] : no spinal tenderness [No Joint Swelling] : no joint swelling [Grossly Normal Strength/Tone] : grossly normal strength/tone [No Rash] : no rash [Coordination Grossly Intact] : coordination grossly intact [No Focal Deficits] : no focal deficits [Normal Gait] : normal gait [Deep Tendon Reflexes (DTR)] : deep tendon reflexes were 2+ and symmetric [Speech Grossly Normal] : speech grossly normal [Memory Grossly Normal] : memory grossly normal [Normal Affect] : the affect was normal [Alert and Oriented x3] : oriented to person, place, and time [Normal Mood] : the mood was normal [Normal Insight/Judgement] : insight and judgment were intact [Decreased Breath Sounds] : breath sounds were decreased diffusely

## 2022-10-28 NOTE — HISTORY OF PRESENT ILLNESS
[Post-hospitalization from ___ Hospital] : Post-hospitalization from [unfilled] Hospital [Admitted on: ___] : The patient was admitted on [unfilled] [Discharged on ___] : discharged on [unfilled] [FreeTextEntry2] : Pt was admitted into the hospital for Pneumonia. Pt was treated with IV  antibiotics and Augmentin and requires multiple blood transfusions. Admitted with confusion fever\par RLL pneumonia seen by Pulmonary Hematology GI Cardiology and Nephrologist \par requiring O 2 portable

## 2022-10-28 NOTE — END OF VISIT
[FreeTextEntry3] : "I, Shweta You, personally scribed the services dictated to me by Dr. Severiano Rodas MD in this documentation on 10/28/2022 " \par \par "I Dr. Severiano Rodas MD, personally performed the services described in this documentation on 10/28/2022 for the patient as scribed by Shweta You in my presence. I have reviewed and verified that all the information is accurate and true."

## 2022-10-28 NOTE — PLAN
[FreeTextEntry1] : continue medications \par To go for Transfusion tomorrow \par follow up with Cardiologist Pulmonary\par recommend PT \par

## 2022-10-29 ENCOUNTER — OUTPATIENT (OUTPATIENT)
Dept: OUTPATIENT SERVICES | Facility: HOSPITAL | Age: 82
LOS: 1 days | End: 2022-10-29
Payer: MEDICARE

## 2022-10-29 VITALS
RESPIRATION RATE: 18 BRPM | HEART RATE: 70 BPM | TEMPERATURE: 99 F | SYSTOLIC BLOOD PRESSURE: 119 MMHG | DIASTOLIC BLOOD PRESSURE: 69 MMHG | WEIGHT: 190.04 LBS | HEIGHT: 65 IN | OXYGEN SATURATION: 93 %

## 2022-10-29 VITALS — RESPIRATION RATE: 18 BRPM | TEMPERATURE: 98 F | HEART RATE: 63 BPM | OXYGEN SATURATION: 96 %

## 2022-10-29 DIAGNOSIS — Z98.89 OTHER SPECIFIED POSTPROCEDURAL STATES: Chronic | ICD-10-CM

## 2022-10-29 DIAGNOSIS — H26.40 UNSPECIFIED SECONDARY CATARACT: Chronic | ICD-10-CM

## 2022-10-29 DIAGNOSIS — Z98.890 OTHER SPECIFIED POSTPROCEDURAL STATES: Chronic | ICD-10-CM

## 2022-10-29 DIAGNOSIS — Z90.710 ACQUIRED ABSENCE OF BOTH CERVIX AND UTERUS: Chronic | ICD-10-CM

## 2022-10-29 DIAGNOSIS — D63.1 ANEMIA IN CHRONIC KIDNEY DISEASE: ICD-10-CM

## 2022-10-29 DIAGNOSIS — S72.001A FRACTURE OF UNSPECIFIED PART OF NECK OF RIGHT FEMUR, INITIAL ENCOUNTER FOR CLOSED FRACTURE: Chronic | ICD-10-CM

## 2022-10-29 DIAGNOSIS — Z95.828 PRESENCE OF OTHER VASCULAR IMPLANTS AND GRAFTS: Chronic | ICD-10-CM

## 2022-10-29 LAB
BLD GP AB SCN SERPL QL: SIGNIFICANT CHANGE UP
HCT VFR BLD CALC: 25.6 % — LOW (ref 34.5–45)
HGB BLD-MCNC: 7.9 G/DL — LOW (ref 11.5–15.5)
MCHC RBC-ENTMCNC: 29.8 PG — SIGNIFICANT CHANGE UP (ref 27–34)
MCHC RBC-ENTMCNC: 30.9 GM/DL — LOW (ref 32–36)
MCV RBC AUTO: 96.6 FL — SIGNIFICANT CHANGE UP (ref 80–100)
NRBC # BLD: 1 /100 WBCS — HIGH (ref 0–0)
PLATELET # BLD AUTO: 152 K/UL — SIGNIFICANT CHANGE UP (ref 150–400)
RBC # BLD: 2.65 M/UL — LOW (ref 3.8–5.2)
RBC # FLD: 21.8 % — HIGH (ref 10.3–14.5)
WBC # BLD: 4.02 K/UL — SIGNIFICANT CHANGE UP (ref 3.8–10.5)
WBC # FLD AUTO: 4.02 K/UL — SIGNIFICANT CHANGE UP (ref 3.8–10.5)

## 2022-10-29 PROCEDURE — P9016: CPT

## 2022-10-29 PROCEDURE — 86900 BLOOD TYPING SEROLOGIC ABO: CPT

## 2022-10-29 PROCEDURE — 36430 TRANSFUSION BLD/BLD COMPNT: CPT

## 2022-10-29 PROCEDURE — 86850 RBC ANTIBODY SCREEN: CPT

## 2022-10-29 PROCEDURE — 86901 BLOOD TYPING SEROLOGIC RH(D): CPT

## 2022-10-29 PROCEDURE — 85027 COMPLETE CBC AUTOMATED: CPT

## 2022-10-29 PROCEDURE — 86923 COMPATIBILITY TEST ELECTRIC: CPT

## 2022-10-29 PROCEDURE — 36415 COLL VENOUS BLD VENIPUNCTURE: CPT

## 2022-10-29 RX ORDER — ACETAMINOPHEN 500 MG
650 TABLET ORAL ONCE
Refills: 0 | Status: COMPLETED | OUTPATIENT
Start: 2022-10-29 | End: 2022-10-29

## 2022-10-29 RX ORDER — DIPHENHYDRAMINE HCL 50 MG
25 CAPSULE ORAL ONCE
Refills: 0 | Status: COMPLETED | OUTPATIENT
Start: 2022-10-29 | End: 2022-10-29

## 2022-10-29 RX ADMIN — Medication 650 MILLIGRAM(S): at 12:27

## 2022-10-29 RX ADMIN — Medication 25 MILLIGRAM(S): at 12:27

## 2022-10-29 NOTE — PROVIDER CONTACT NOTE (OTHER) - ACTION/TREATMENT ORDERED:
Hope Lucero MD VISIT  DR JO per md he will add orders for tylenol and benadryl. no new orders.

## 2022-10-29 NOTE — PROVIDER CONTACT NOTE (OTHER) - ACTION/TREATMENT ORDERED:
per md lanier for telephoneorders for type and screen , cbc, regular diet , 1 unit of prbc. no other new orders.

## 2022-10-29 NOTE — PROVIDER CONTACT NOTE (OTHER) - SITUATION
pt requesting bendadry and tylenol pre blood transfusion.  None ordered at this time.
pt outpatient blood transfusion. No orders noted for pt at this time.
unable to access IV at this time multiple attempts made by RN and ICU nurse. surgical PA notified by ICU nurse Dominga. PA at bedside with ultrasound guided IV.

## 2022-11-01 ENCOUNTER — APPOINTMENT (OUTPATIENT)
Dept: CARDIOLOGY | Facility: CLINIC | Age: 82
End: 2022-11-01

## 2022-11-07 ENCOUNTER — OUTPATIENT (OUTPATIENT)
Dept: OUTPATIENT SERVICES | Facility: HOSPITAL | Age: 82
LOS: 1 days | End: 2022-11-07
Payer: MEDICARE

## 2022-11-07 DIAGNOSIS — Z98.89 OTHER SPECIFIED POSTPROCEDURAL STATES: Chronic | ICD-10-CM

## 2022-11-07 DIAGNOSIS — S72.001A FRACTURE OF UNSPECIFIED PART OF NECK OF RIGHT FEMUR, INITIAL ENCOUNTER FOR CLOSED FRACTURE: Chronic | ICD-10-CM

## 2022-11-07 DIAGNOSIS — H26.40 UNSPECIFIED SECONDARY CATARACT: Chronic | ICD-10-CM

## 2022-11-07 DIAGNOSIS — Z98.890 OTHER SPECIFIED POSTPROCEDURAL STATES: Chronic | ICD-10-CM

## 2022-11-07 DIAGNOSIS — Z95.828 PRESENCE OF OTHER VASCULAR IMPLANTS AND GRAFTS: Chronic | ICD-10-CM

## 2022-11-07 DIAGNOSIS — D46.Z OTHER MYELODYSPLASTIC SYNDROMES: ICD-10-CM

## 2022-11-07 DIAGNOSIS — Z90.710 ACQUIRED ABSENCE OF BOTH CERVIX AND UTERUS: Chronic | ICD-10-CM

## 2022-11-07 LAB
INR BLD: 1.51 RATIO — HIGH (ref 0.88–1.16)
INR PPP: 1.55
PROTHROM AB SERPL-ACNC: 17.7 SEC — HIGH (ref 10.5–13.4)

## 2022-11-07 PROCEDURE — C1769: CPT

## 2022-11-07 PROCEDURE — 85610 PROTHROMBIN TIME: CPT

## 2022-11-07 PROCEDURE — 36415 COLL VENOUS BLD VENIPUNCTURE: CPT

## 2022-11-07 PROCEDURE — 76937 US GUIDE VASCULAR ACCESS: CPT

## 2022-11-07 PROCEDURE — 77001 FLUOROGUIDE FOR VEIN DEVICE: CPT

## 2022-11-07 PROCEDURE — 36561 INSERT TUNNELED CV CATH: CPT

## 2022-11-07 PROCEDURE — 77001 FLUOROGUIDE FOR VEIN DEVICE: CPT | Mod: 26

## 2022-11-07 PROCEDURE — C1788: CPT

## 2022-11-07 PROCEDURE — C1894: CPT

## 2022-11-07 PROCEDURE — 76937 US GUIDE VASCULAR ACCESS: CPT | Mod: 26

## 2022-11-07 RX ORDER — MIDAZOLAM HYDROCHLORIDE 1 MG/ML
2 INJECTION, SOLUTION INTRAMUSCULAR; INTRAVENOUS ONCE
Refills: 0 | Status: DISCONTINUED | OUTPATIENT
Start: 2022-11-07 | End: 2022-11-07

## 2022-11-07 RX ORDER — ENOXAPARIN SODIUM 80 MG/.8ML
80 INJECTION, SOLUTION SUBCUTANEOUS TWICE DAILY
Qty: 10 | Refills: 0 | Status: DISCONTINUED | COMMUNITY
Start: 2022-11-07 | End: 2022-11-07

## 2022-11-07 RX ORDER — CEFAZOLIN SODIUM 1 G
2000 VIAL (EA) INJECTION ONCE
Refills: 0 | Status: COMPLETED | OUTPATIENT
Start: 2022-11-07 | End: 2022-11-07

## 2022-11-07 RX ORDER — SODIUM CHLORIDE 9 MG/ML
1000 INJECTION INTRAMUSCULAR; INTRAVENOUS; SUBCUTANEOUS
Refills: 0 | Status: DISCONTINUED | OUTPATIENT
Start: 2022-11-07 | End: 2022-11-21

## 2022-11-07 RX ORDER — FENTANYL CITRATE 50 UG/ML
100 INJECTION INTRAVENOUS ONCE
Refills: 0 | Status: DISCONTINUED | OUTPATIENT
Start: 2022-11-07 | End: 2022-11-07

## 2022-11-07 RX ADMIN — MIDAZOLAM HYDROCHLORIDE 2 MILLIGRAM(S): 1 INJECTION, SOLUTION INTRAMUSCULAR; INTRAVENOUS at 09:32

## 2022-11-07 RX ADMIN — SODIUM CHLORIDE 10 MILLILITER(S): 9 INJECTION INTRAMUSCULAR; INTRAVENOUS; SUBCUTANEOUS at 09:31

## 2022-11-07 RX ADMIN — FENTANYL CITRATE 100 MICROGRAM(S): 50 INJECTION INTRAVENOUS at 09:32

## 2022-11-07 RX ADMIN — Medication 100 MILLIGRAM(S): at 09:32

## 2022-11-09 ENCOUNTER — APPOINTMENT (OUTPATIENT)
Dept: CARDIOLOGY | Facility: CLINIC | Age: 82
End: 2022-11-09

## 2022-11-09 LAB
INR PPP: 1.5 RATIO
QUALITY CONTROL: YES

## 2022-11-09 PROCEDURE — 85610 PROTHROMBIN TIME: CPT | Mod: QW

## 2022-11-09 PROCEDURE — 93793 ANTICOAG MGMT PT WARFARIN: CPT

## 2022-11-11 ENCOUNTER — APPOINTMENT (OUTPATIENT)
Dept: CARDIOLOGY | Facility: CLINIC | Age: 82
End: 2022-11-11

## 2022-11-11 PROCEDURE — 85610 PROTHROMBIN TIME: CPT | Mod: QW

## 2022-11-11 PROCEDURE — 93793 ANTICOAG MGMT PT WARFARIN: CPT

## 2022-11-14 ENCOUNTER — APPOINTMENT (OUTPATIENT)
Dept: CARDIOLOGY | Facility: CLINIC | Age: 82
End: 2022-11-14

## 2022-11-14 LAB
INR PPP: 1.7 RATIO
INR PPP: 2.9 RATIO
QUALITY CONTROL: YES
QUALITY CONTROL: YES

## 2022-11-14 PROCEDURE — 93793 ANTICOAG MGMT PT WARFARIN: CPT

## 2022-11-14 PROCEDURE — 85610 PROTHROMBIN TIME: CPT | Mod: QW

## 2022-11-18 ENCOUNTER — APPOINTMENT (OUTPATIENT)
Dept: CARDIOLOGY | Facility: CLINIC | Age: 82
End: 2022-11-18

## 2022-11-18 LAB
INR PPP: 2.3 RATIO
QUALITY CONTROL: YES

## 2022-11-18 PROCEDURE — 85610 PROTHROMBIN TIME: CPT | Mod: QW

## 2022-11-18 PROCEDURE — 93793 ANTICOAG MGMT PT WARFARIN: CPT

## 2022-11-22 ENCOUNTER — APPOINTMENT (OUTPATIENT)
Dept: CARDIOLOGY | Facility: CLINIC | Age: 82
End: 2022-11-22

## 2022-11-22 LAB
INR PPP: 2.6 RATIO
QUALITY CONTROL: YES

## 2022-11-22 PROCEDURE — 93793 ANTICOAG MGMT PT WARFARIN: CPT

## 2022-11-22 PROCEDURE — 85610 PROTHROMBIN TIME: CPT | Mod: QW

## 2022-11-25 ENCOUNTER — OUTPATIENT (OUTPATIENT)
Dept: OUTPATIENT SERVICES | Facility: HOSPITAL | Age: 82
LOS: 1 days | End: 2022-11-25
Payer: MEDICARE

## 2022-11-25 VITALS
RESPIRATION RATE: 18 BRPM | TEMPERATURE: 98 F | SYSTOLIC BLOOD PRESSURE: 131 MMHG | HEART RATE: 65 BPM | OXYGEN SATURATION: 92 % | DIASTOLIC BLOOD PRESSURE: 78 MMHG

## 2022-11-25 VITALS
DIASTOLIC BLOOD PRESSURE: 81 MMHG | OXYGEN SATURATION: 92 % | TEMPERATURE: 98 F | RESPIRATION RATE: 15 BRPM | HEART RATE: 66 BPM | SYSTOLIC BLOOD PRESSURE: 174 MMHG

## 2022-11-25 DIAGNOSIS — Z98.89 OTHER SPECIFIED POSTPROCEDURAL STATES: Chronic | ICD-10-CM

## 2022-11-25 DIAGNOSIS — Z98.890 OTHER SPECIFIED POSTPROCEDURAL STATES: Chronic | ICD-10-CM

## 2022-11-25 DIAGNOSIS — Z95.828 PRESENCE OF OTHER VASCULAR IMPLANTS AND GRAFTS: Chronic | ICD-10-CM

## 2022-11-25 DIAGNOSIS — D63.1 ANEMIA IN CHRONIC KIDNEY DISEASE: ICD-10-CM

## 2022-11-25 DIAGNOSIS — Z90.710 ACQUIRED ABSENCE OF BOTH CERVIX AND UTERUS: Chronic | ICD-10-CM

## 2022-11-25 DIAGNOSIS — H26.40 UNSPECIFIED SECONDARY CATARACT: Chronic | ICD-10-CM

## 2022-11-25 DIAGNOSIS — S72.001A FRACTURE OF UNSPECIFIED PART OF NECK OF RIGHT FEMUR, INITIAL ENCOUNTER FOR CLOSED FRACTURE: Chronic | ICD-10-CM

## 2022-11-25 LAB
HCT VFR BLD CALC: 26.1 % — LOW (ref 34.5–45)
HGB BLD-MCNC: 8 G/DL — LOW (ref 11.5–15.5)
MCHC RBC-ENTMCNC: 29.2 PG — SIGNIFICANT CHANGE UP (ref 27–34)
MCHC RBC-ENTMCNC: 30.7 GM/DL — LOW (ref 32–36)
MCV RBC AUTO: 95.3 FL — SIGNIFICANT CHANGE UP (ref 80–100)
NRBC # BLD: 2 /100 WBCS — HIGH (ref 0–0)
PLATELET # BLD AUTO: 172 K/UL — SIGNIFICANT CHANGE UP (ref 150–400)
RBC # BLD: 2.74 M/UL — LOW (ref 3.8–5.2)
RBC # FLD: 23.2 % — HIGH (ref 10.3–14.5)
WBC # BLD: 3.69 K/UL — LOW (ref 3.8–10.5)
WBC # FLD AUTO: 3.69 K/UL — LOW (ref 3.8–10.5)

## 2022-11-25 PROCEDURE — 86901 BLOOD TYPING SEROLOGIC RH(D): CPT

## 2022-11-25 PROCEDURE — 86923 COMPATIBILITY TEST ELECTRIC: CPT

## 2022-11-25 PROCEDURE — 85027 COMPLETE CBC AUTOMATED: CPT

## 2022-11-25 PROCEDURE — 86900 BLOOD TYPING SEROLOGIC ABO: CPT

## 2022-11-25 PROCEDURE — 36415 COLL VENOUS BLD VENIPUNCTURE: CPT

## 2022-11-25 PROCEDURE — 36430 TRANSFUSION BLD/BLD COMPNT: CPT

## 2022-11-25 PROCEDURE — 86850 RBC ANTIBODY SCREEN: CPT

## 2022-11-25 PROCEDURE — P9016: CPT

## 2022-11-25 RX ORDER — ACETAMINOPHEN 500 MG
650 TABLET ORAL ONCE
Refills: 0 | Status: COMPLETED | OUTPATIENT
Start: 2022-11-25 | End: 2022-11-25

## 2022-11-25 RX ORDER — DIPHENHYDRAMINE HCL 50 MG
25 CAPSULE ORAL ONCE
Refills: 0 | Status: COMPLETED | OUTPATIENT
Start: 2022-11-25 | End: 2022-11-25

## 2022-11-25 RX ADMIN — Medication 25 MILLIGRAM(S): at 10:49

## 2022-11-25 RX ADMIN — Medication 650 MILLIGRAM(S): at 10:50

## 2022-11-25 RX ADMIN — Medication 300 UNIT(S): at 17:27

## 2022-11-29 ENCOUNTER — APPOINTMENT (OUTPATIENT)
Dept: CARDIOLOGY | Facility: CLINIC | Age: 82
End: 2022-11-29

## 2022-12-13 ENCOUNTER — APPOINTMENT (OUTPATIENT)
Dept: CARDIOLOGY | Facility: CLINIC | Age: 82
End: 2022-12-13

## 2022-12-13 LAB
INR PPP: 2.8 RATIO
QUALITY CONTROL: YES

## 2022-12-13 PROCEDURE — 93793 ANTICOAG MGMT PT WARFARIN: CPT

## 2022-12-13 PROCEDURE — 85610 PROTHROMBIN TIME: CPT | Mod: QW

## 2022-12-26 ENCOUNTER — NON-APPOINTMENT (OUTPATIENT)
Age: 82
End: 2022-12-26

## 2023-01-10 ENCOUNTER — OUTPATIENT (OUTPATIENT)
Dept: OUTPATIENT SERVICES | Facility: HOSPITAL | Age: 83
LOS: 1 days | End: 2023-01-10
Payer: MEDICARE

## 2023-01-10 VITALS
RESPIRATION RATE: 17 BRPM | HEART RATE: 64 BPM | DIASTOLIC BLOOD PRESSURE: 58 MMHG | TEMPERATURE: 98 F | SYSTOLIC BLOOD PRESSURE: 106 MMHG | OXYGEN SATURATION: 92 %

## 2023-01-10 VITALS — DIASTOLIC BLOOD PRESSURE: 81 MMHG | SYSTOLIC BLOOD PRESSURE: 166 MMHG

## 2023-01-10 DIAGNOSIS — Z98.89 OTHER SPECIFIED POSTPROCEDURAL STATES: Chronic | ICD-10-CM

## 2023-01-10 DIAGNOSIS — Z98.890 OTHER SPECIFIED POSTPROCEDURAL STATES: Chronic | ICD-10-CM

## 2023-01-10 DIAGNOSIS — Z95.828 PRESENCE OF OTHER VASCULAR IMPLANTS AND GRAFTS: Chronic | ICD-10-CM

## 2023-01-10 DIAGNOSIS — Z90.710 ACQUIRED ABSENCE OF BOTH CERVIX AND UTERUS: Chronic | ICD-10-CM

## 2023-01-10 DIAGNOSIS — S72.001A FRACTURE OF UNSPECIFIED PART OF NECK OF RIGHT FEMUR, INITIAL ENCOUNTER FOR CLOSED FRACTURE: Chronic | ICD-10-CM

## 2023-01-10 DIAGNOSIS — D63.1 ANEMIA IN CHRONIC KIDNEY DISEASE: ICD-10-CM

## 2023-01-10 DIAGNOSIS — H26.40 UNSPECIFIED SECONDARY CATARACT: Chronic | ICD-10-CM

## 2023-01-10 LAB
BLD GP AB SCN SERPL QL: SIGNIFICANT CHANGE UP
HCT VFR BLD CALC: 23.1 % — LOW (ref 34.5–45)
HGB BLD-MCNC: 7.1 G/DL — LOW (ref 11.5–15.5)
MCHC RBC-ENTMCNC: 30.1 PG — SIGNIFICANT CHANGE UP (ref 27–34)
MCHC RBC-ENTMCNC: 30.7 GM/DL — LOW (ref 32–36)
MCV RBC AUTO: 97.9 FL — SIGNIFICANT CHANGE UP (ref 80–100)
NRBC # BLD: 0 /100 WBCS — SIGNIFICANT CHANGE UP (ref 0–0)
PLATELET # BLD AUTO: 175 K/UL — SIGNIFICANT CHANGE UP (ref 150–400)
RBC # BLD: 2.36 M/UL — LOW (ref 3.8–5.2)
RBC # FLD: 25.8 % — HIGH (ref 10.3–14.5)
WBC # BLD: 3.49 K/UL — LOW (ref 3.8–10.5)
WBC # FLD AUTO: 3.49 K/UL — LOW (ref 3.8–10.5)

## 2023-01-10 PROCEDURE — 86850 RBC ANTIBODY SCREEN: CPT

## 2023-01-10 PROCEDURE — 86901 BLOOD TYPING SEROLOGIC RH(D): CPT

## 2023-01-10 PROCEDURE — 85027 COMPLETE CBC AUTOMATED: CPT

## 2023-01-10 PROCEDURE — 36415 COLL VENOUS BLD VENIPUNCTURE: CPT

## 2023-01-10 PROCEDURE — 86923 COMPATIBILITY TEST ELECTRIC: CPT

## 2023-01-10 PROCEDURE — P9016: CPT

## 2023-01-10 PROCEDURE — 86900 BLOOD TYPING SEROLOGIC ABO: CPT

## 2023-01-10 PROCEDURE — 99234 HOSP IP/OBS SM DT SF/LOW 45: CPT

## 2023-01-10 PROCEDURE — 36430 TRANSFUSION BLD/BLD COMPNT: CPT

## 2023-01-10 RX ORDER — DIPHENHYDRAMINE HCL 50 MG
25 CAPSULE ORAL ONCE
Refills: 0 | Status: COMPLETED | OUTPATIENT
Start: 2023-01-10 | End: 2023-01-10

## 2023-01-10 RX ORDER — FUROSEMIDE 40 MG
40 TABLET ORAL ONCE
Refills: 0 | Status: COMPLETED | OUTPATIENT
Start: 2023-01-10 | End: 2023-01-10

## 2023-01-10 RX ORDER — ACETAMINOPHEN 500 MG
650 TABLET ORAL ONCE
Refills: 0 | Status: COMPLETED | OUTPATIENT
Start: 2023-01-10 | End: 2023-01-10

## 2023-01-10 RX ADMIN — Medication 25 MILLIGRAM(S): at 11:07

## 2023-01-10 RX ADMIN — Medication 40 MILLIGRAM(S): at 18:39

## 2023-01-10 RX ADMIN — Medication 650 MILLIGRAM(S): at 11:07

## 2023-01-10 RX ADMIN — Medication 650 MILLIGRAM(S): at 11:45

## 2023-01-10 RX ADMIN — Medication 300 UNIT(S): at 19:14

## 2023-01-10 NOTE — CONSULT NOTE ADULT - ASSESSMENT
82-year-old woman with history as listed above who presents today for a blood transfusion.  Consult called by family and hematology.  She recently has had near syncopal episode especially when changing position.  She has a history of dysautonomia in the past which may have worsened post-COVID. Most likely her symptoms recently were from orthostasis status post increased vagal tone in setting of worsening anemia.  She should get blood transfusion to maintain a hemoglobin of greater than 8.  I have counseled her to increase her fluid intake.  She is is compensated from a heart failure standpoint.  Would continue with amiodarone 100 mg daily.  Continue current dosing of diltiazem 30 mg every 12.  Will need an outpatient monitor.  May need a GI evaluation in the future.  For now continue with anticoagulation.  Monitor for GI bleeding clinic.  Monitor for heart failure post transfusion.  We will follow-up in the office in the near future.

## 2023-01-10 NOTE — CONSULT NOTE ADULT - SUBJECTIVE AND OBJECTIVE BOX
CHIEF COMPLAINT: Patient is a 82y old  Female who presents with a chief complaint of     HPI: 82-year-old obese female past medical history previously diagnosed with DMII (no longer current), HTN, GERD, COPD not on home O2, VTE including extensive PE, pAfib s/p ablation currently on Coumadin, Aplastic Anemia, CKD, HFpEF presents for a blood transfusion. Recently she had covid. She has had multiple falls mainly after changing posiitons. Had a episode of near syncope after using the toilet and standing up. She was then noted to have severe anemia by hematology.  She denies any dyspnea, PND, orthopnea, lower extremity edema, syncope, chest pain, palpitations.  She has been compliant with her Coumadin.  She denies any melena, hematochezia, hematemesis.           REVIEW OF SYSTEMS:   All other review of systems are negative unless indicated above    PAST MEDICAL & SURGICAL HISTORY:  COPD (chronic obstructive pulmonary disease)      DM (diabetes mellitus)      PAF (paroxysmal atrial fibrillation)  s/p ablation      Hiatal hernia      GIB (gastrointestinal bleeding)      S/P hysterectomy      S/P foot surgery      S/P knee surgery      After cataract  bilateral eye cataract surgically removed      Closed right hip fracture  rigth hip ORIF july 19 2016      S/P IVC filter  nov 2016      S/P carpal tunnel release  Right 2008 &amp; 6/27/17          SOCIAL HISTORY:  No tobacco, ethanol, or drug abuse.    FAMILY HISTORY:  Family history of bladder cancer (Mother)    Family history of myocardial infarction (Father)      No family history of acute MI or sudden cardiac death.    MEDICATIONS  (STANDING):    MEDICATIONS  (PRN):      Allergies    No Known Allergies    Intolerances        Home meds:  Home Medications:  amiodarone 100 mg oral tablet: 1 tab(s) orally once a day (05 Oct 2022 16:37)  Bentyl 10 mg oral capsule: 1 cap(s) orally 2 times a day, As Needed (05 Oct 2022 16:37)  Cardizem: 25 milligram(s) orally 2 times a day (05 Oct 2022 16:37)  Coumadin 4 mg oral tablet: 1 tab(s) orally once a day  4mg 4x week; 6mg 3x week (05 Oct 2022 16:37)  folic acid 1 mg oral tablet: 1 tab(s) orally once a day (05 Oct 2022 16:37)  gabapentin 400 mg oral capsule: 1 cap(s) orally 2 times a day (05 Oct 2022 16:37)  guaiFENesin 100 mg/5 mL oral liquid: 10 milliliter(s) orally every 6 hours, As needed, Cough (14 Oct 2022 16:40)  NexIUM: 15 milligram(s) orally once a day (05 Oct 2022 16:37)  Procrit 10,000 units/mL preservative-free injectable solution: injectable once a week (05 Oct 2022 16:37)  simvastatin 10 mg oral tablet: 1 tab(s) orally once a day (at bedtime) (05 Oct 2022 16:37)  Singulair 10 mg oral tablet: 1 tab(s) orally once a day (05 Oct 2022 16:37)  traMADol 50 mg oral tablet: 1 tab(s) orally 2 times a day (05 Oct 2022 16:37)  Trelegy Ellipta: inhaled once a day (05 Oct 2022 16:37)        VITAL SIGNS:   Vital Signs Last 24 Hrs  T(C): 36.7 (10 Mario 2023 14:45), Max: 36.8 (10 Mario 2023 11:03)  T(F): 98.1 (10 Mario 2023 14:45), Max: 98.2 (10 Mario 2023 11:03)  HR: 64 (10 Mario 2023 14:45) (62 - 64)  BP: 149/82 (10 Mario 2023 14:45) (106/58 - 149/82)  BP(mean): --  RR: 16 (10 Mario 2023 14:45) (16 - 17)  SpO2: 94% (10 Mario 2023 14:45) (92% - 94%)    Parameters below as of 10 Mario 2023 14:45  Patient On (Oxygen Delivery Method): room air        I&O's Summary      On Exam:  TELE:   Constitutional: NAD, awake and alert, well-developed  HEENT: Moist Mucous Membranes, Anicteric  Pulmonary: Non-labored, breath sounds are clear bilaterally, No wheezing, rales or rhonchi  Cardiovascular: Regular, S1 and S2, No murmurs, rubs, gallops or clicks  Gastrointestinal: Bowel Sounds present, soft, nontender.   Lymph: No peripheral edema. No lymphadenopathy.  Skin: No visible rashes or ulcers.  Psych:  Mood & affect appropriate    LABS: All Labs Reviewed:                        7.1    3.49  )-----------( 175      ( 10 Mario 2023 09:30 )             23.1               - Troponin:   Blood Culture:           RADIOLOGY:

## 2023-01-18 ENCOUNTER — NON-APPOINTMENT (OUTPATIENT)
Age: 83
End: 2023-01-18

## 2023-01-19 ENCOUNTER — APPOINTMENT (OUTPATIENT)
Dept: CARDIOLOGY | Facility: CLINIC | Age: 83
End: 2023-01-19
Payer: MEDICARE

## 2023-01-19 LAB
INR PPP: 2.4 RATIO
QUALITY CONTROL: YES

## 2023-01-19 PROCEDURE — 85610 PROTHROMBIN TIME: CPT | Mod: QW

## 2023-01-19 PROCEDURE — 93793 ANTICOAG MGMT PT WARFARIN: CPT

## 2023-01-26 ENCOUNTER — APPOINTMENT (OUTPATIENT)
Dept: CARDIOLOGY | Facility: CLINIC | Age: 83
End: 2023-01-26
Payer: MEDICARE

## 2023-01-26 ENCOUNTER — NON-APPOINTMENT (OUTPATIENT)
Age: 83
End: 2023-01-26

## 2023-01-26 VITALS — DIASTOLIC BLOOD PRESSURE: 62 MMHG | SYSTOLIC BLOOD PRESSURE: 130 MMHG

## 2023-01-26 VITALS
BODY MASS INDEX: 30.48 KG/M2 | HEART RATE: 72 BPM | HEIGHT: 63 IN | SYSTOLIC BLOOD PRESSURE: 125 MMHG | DIASTOLIC BLOOD PRESSURE: 77 MMHG | OXYGEN SATURATION: 95 % | WEIGHT: 172 LBS

## 2023-01-26 VITALS — SYSTOLIC BLOOD PRESSURE: 120 MMHG | DIASTOLIC BLOOD PRESSURE: 58 MMHG

## 2023-01-26 VITALS — DIASTOLIC BLOOD PRESSURE: 60 MMHG | SYSTOLIC BLOOD PRESSURE: 128 MMHG

## 2023-01-26 PROCEDURE — 99215 OFFICE O/P EST HI 40 MIN: CPT

## 2023-01-26 PROCEDURE — 93000 ELECTROCARDIOGRAM COMPLETE: CPT

## 2023-01-26 RX ORDER — DILTIAZEM HYDROCHLORIDE 30 MG/1
30 TABLET ORAL
Qty: 180 | Refills: 2 | Status: DISCONTINUED | COMMUNITY
Start: 2022-05-12 | End: 2023-01-26

## 2023-01-26 NOTE — CARDIOLOGY SUMMARY
[de-identified] : Sinus  Rhythm \par -RSR(V1) -nondiagnostic. \par  -Nonspecific ST depression  -Nondiagnostic. \par  [de-identified] : 4/2021 normal LV function w moderate LVH. moderate PHTN 55mmHG

## 2023-01-26 NOTE — REVIEW OF SYSTEMS
[Cough] : cough [Dizziness] : dizziness [Negative] : Heme/Lymph [Feeling Fatigued] : not feeling fatigued [FreeTextEntry2] : as per HPI [de-identified] : as per HPI- unchanged

## 2023-01-26 NOTE — HISTORY OF PRESENT ILLNESS
[FreeTextEntry1] : 82 year old female with PMhx of AFib (on Warfarin), CKD, COPD, Anemia, DM, HTN, Hiatal Hernia, IBS, PAD, TMJ, Sinus Ermias who is here for Cardiac Follow up.\par \par She reports since her last visit being admitted 2 weeks ago for ongoing low hemoglobin of 6.8 She was symptotic with dizziness, weakness and near fainting episodes. She states still having the ongoing near fainting episode where her arms and legs get weak and needs to immediately sit down. I personally reviewed all of the hospital records available to me at this time, which included but are not limited to the discharge summary, labs and imaging reports. \par \par She continue to take her medications as prescribed. She states due to the ongoing low hemoglobin she was started on sucralfate for history of gastric ulcer. She is having an EGD on 2/13/2023 with Dr Jefferson to rule out and would also like Cardiac clearance to stop Warfarin. \par \par She  denies any chest pain, syncope  strokelike symptoms. No reported melena, hematochezia or hematemesis. Intermittent palpitations, dizzy spells come and go still which is unchanged. \par Medication reconciliation performed. She is compliant with her medications. \par \par \par \par

## 2023-01-26 NOTE — DISCUSSION/SUMMARY
[FreeTextEntry1] : 81 year old woman with a history as listed above presents for a followup visit. \par \par Her Blood pressure continue to remain controlled low with Amiodarone and Diltiazem. She is still having ongoing near fainting spells that are more likely brought on by being positive orthostatic. I did repeat her Blood pressure manually showing from supine to standing showing significant drop in SBP. Explained I will discontinue her Diltiazem today and will start Midodrine 2.5 mg twice daily to increase her blood pressure which will allow room to prevent significant drop in SBP and decrease her near fainting episodes. My office will call her in one week to check on her. \par \par She denies any anginal symptoms. Clinically she is not in decompensated heart failure.  Her physical exam was essentially unrevealing for any significant cardiovascular findings. Her EKG is unchanged from previous.  \par \par The lightheadedness is likely from mild orthostasis and is ongoing.  She will try to get up slower.  She had a Ziopatch in 2/2021 that showed symptomatic PACs and PAT episodes. There was no AF seen. She did not have any signs of heart block or symptomatic bradycardia during the monitoring period.  Cardizem will be stopped today. She will continue  Amiodarone 100mg Qday and I will start her on Midodrine 2.5 mg twice daily\par Her lipid profile is at goal. \par \par She will have her INR checked in our [facility]. Her goal INR is 2-3 for CVA risk reduction and VTE prevention. \par  \par She continue to follow up with Dr Gallegos for her COPD. \par \par I explained I will clear her for EGD with Dr Webb and for her to stop Warfarin 5 days prior to procedure pending the 1 week follow up call to see how she is tolerating the Midodrine and her orthostasis symptoms. \par \par She will take Lovenox SQ duing those 5 days and hold the day of procedure. \par I asked she return for follow up in 3 months.\par \par  \par   [EKG obtained to assist in diagnosis and management of assessed problem(s)] : EKG obtained to assist in diagnosis and management of assessed problem(s)

## 2023-01-26 NOTE — PHYSICAL EXAM
[No Respiratory Distress] : no respiratory distress  [Wheeze ____] : wheeze [unfilled] [Normal] : alert and oriented, normal memory

## 2023-02-12 ENCOUNTER — TRANSCRIPTION ENCOUNTER (OUTPATIENT)
Age: 83
End: 2023-02-12

## 2023-02-13 ENCOUNTER — OUTPATIENT (OUTPATIENT)
Dept: OUTPATIENT SERVICES | Facility: HOSPITAL | Age: 83
LOS: 1 days | End: 2023-02-13
Payer: MEDICARE

## 2023-02-13 DIAGNOSIS — S72.001A FRACTURE OF UNSPECIFIED PART OF NECK OF RIGHT FEMUR, INITIAL ENCOUNTER FOR CLOSED FRACTURE: Chronic | ICD-10-CM

## 2023-02-13 DIAGNOSIS — Z90.710 ACQUIRED ABSENCE OF BOTH CERVIX AND UTERUS: Chronic | ICD-10-CM

## 2023-02-13 DIAGNOSIS — H26.40 UNSPECIFIED SECONDARY CATARACT: Chronic | ICD-10-CM

## 2023-02-13 DIAGNOSIS — Z98.890 OTHER SPECIFIED POSTPROCEDURAL STATES: Chronic | ICD-10-CM

## 2023-02-13 DIAGNOSIS — Z98.89 OTHER SPECIFIED POSTPROCEDURAL STATES: Chronic | ICD-10-CM

## 2023-02-13 DIAGNOSIS — Z95.828 PRESENCE OF OTHER VASCULAR IMPLANTS AND GRAFTS: Chronic | ICD-10-CM

## 2023-02-13 DIAGNOSIS — D64.9 ANEMIA, UNSPECIFIED: ICD-10-CM

## 2023-02-13 LAB
APTT BLD: 38.7 SEC — HIGH (ref 27.5–35.5)
INR BLD: 2.16 RATIO — HIGH (ref 0.88–1.16)
PROTHROM AB SERPL-ACNC: 25.5 SEC — HIGH (ref 10.5–13.4)

## 2023-02-13 PROCEDURE — 85610 PROTHROMBIN TIME: CPT

## 2023-02-13 PROCEDURE — 85730 THROMBOPLASTIN TIME PARTIAL: CPT

## 2023-02-13 PROCEDURE — 88305 TISSUE EXAM BY PATHOLOGIST: CPT | Mod: 26

## 2023-02-13 PROCEDURE — 88313 SPECIAL STAINS GROUP 2: CPT

## 2023-02-13 PROCEDURE — 88312 SPECIAL STAINS GROUP 1: CPT

## 2023-02-13 PROCEDURE — 43239 EGD BIOPSY SINGLE/MULTIPLE: CPT

## 2023-02-13 PROCEDURE — 88305 TISSUE EXAM BY PATHOLOGIST: CPT

## 2023-02-13 PROCEDURE — 88312 SPECIAL STAINS GROUP 1: CPT | Mod: 26

## 2023-02-13 PROCEDURE — 36415 COLL VENOUS BLD VENIPUNCTURE: CPT

## 2023-02-13 PROCEDURE — 88313 SPECIAL STAINS GROUP 2: CPT | Mod: 26

## 2023-02-13 DEVICE — ESOPHAGEAL BALLOON CATH CRE FIXED WIRE 6-7-8MM: Type: IMPLANTABLE DEVICE | Status: FUNCTIONAL

## 2023-02-14 LAB
INR PPP: 2.1 RATIO
QUALITY CONTROL: YES

## 2023-02-28 ENCOUNTER — NON-APPOINTMENT (OUTPATIENT)
Age: 83
End: 2023-02-28

## 2023-02-28 ENCOUNTER — APPOINTMENT (OUTPATIENT)
Dept: CARDIOLOGY | Facility: CLINIC | Age: 83
End: 2023-02-28
Payer: MEDICARE

## 2023-02-28 VITALS
OXYGEN SATURATION: 96 % | DIASTOLIC BLOOD PRESSURE: 80 MMHG | BODY MASS INDEX: 31.18 KG/M2 | HEIGHT: 63 IN | SYSTOLIC BLOOD PRESSURE: 136 MMHG | WEIGHT: 176 LBS | HEART RATE: 71 BPM

## 2023-02-28 PROCEDURE — 85610 PROTHROMBIN TIME: CPT | Mod: QW

## 2023-02-28 PROCEDURE — 99214 OFFICE O/P EST MOD 30 MIN: CPT

## 2023-02-28 PROCEDURE — 93000 ELECTROCARDIOGRAM COMPLETE: CPT

## 2023-02-28 NOTE — CARDIOLOGY SUMMARY
[de-identified] : Sinus  Rhythm \par -RSR(V1) -nondiagnostic. \par  -Nonspecific ST depression  -Nondiagnostic. \par  [de-identified] :  2/2021 that showed symptomatic PACs and PAT episodes. There was no AF seen.  [de-identified] : 4/2021 normal LV function w moderate LVH. moderate PHTN 55mmHG

## 2023-02-28 NOTE — HISTORY OF PRESENT ILLNESS
[FreeTextEntry1] : 82 year old female with PMhx of AFib (on Warfarin), CKD, COPD, Anemia, DM, HTN, Hiatal Hernia, IBS, PAD, TMJ, Sinus Ermias who is here for Cardiac Follow up.\par \par Since her last visit, she has been feeling better. Her dizziness has resolved on midodrine. She had an unremarkable EGD.  No reported melena, hematochezia or hematemesis.  She   denies any chest pain, PND, orthopnea, lower extremity edema, near syncope, syncope, strokelike symptoms. \par Medication reconciliation performed. She is compliant with her medications. \par \par \par \par

## 2023-02-28 NOTE — REVIEW OF SYSTEMS
[Feeling Fatigued] : not feeling fatigued [Cough] : cough [Dizziness] : dizziness [Negative] : Heme/Lymph [FreeTextEntry2] : as per HPI [de-identified] : as per HPI- unchanged

## 2023-02-28 NOTE — DISCUSSION/SUMMARY
[FreeTextEntry1] : 81 year old woman with a history as listed above presents for a followup visit. \par Dee is feeling better. Her orthostasis has resolved on Midodrine 2.5 mg twice daily.\par She denies any anginal symptoms. Clinically she is not in decompensated heart failure.  Her physical exam was essentially unrevealing for any significant cardiovascular findings. Her EKG is unchanged from previous.  \par \par  She had a Ziopatch in 2/2021 that showed symptomatic PACs and PAT episodes. There was no AF seen. She did not have any signs of heart block or symptomatic bradycardia during the monitoring period.  Cardizem will be stopped today. She will continue  Amiodarone 100mg Qday \par \par Her lipid profile is at goal. \par \par She will have her INR checked in our [facility]. Her goal INR is 2-3 for CVA risk reduction and VTE prevention. \par She will followup with heme for her anemia and transfusions. \par She continue to follow up with Dr Gallegos for her COPD. \par Exercise and diet counseling was performed in order to reduce her future cardiovascular risk. \par I asked she return for follow up in 3 months.\par \par  \par   [EKG obtained to assist in diagnosis and management of assessed problem(s)] : EKG obtained to assist in diagnosis and management of assessed problem(s)

## 2023-03-01 LAB
INR PPP: 1.6 RATIO
QUALITY CONTROL: YES

## 2023-03-07 ENCOUNTER — APPOINTMENT (OUTPATIENT)
Dept: CARDIOLOGY | Facility: CLINIC | Age: 83
End: 2023-03-07
Payer: MEDICARE

## 2023-03-07 PROCEDURE — 85610 PROTHROMBIN TIME: CPT | Mod: QW

## 2023-03-07 PROCEDURE — 93793 ANTICOAG MGMT PT WARFARIN: CPT

## 2023-03-08 ENCOUNTER — RX RENEWAL (OUTPATIENT)
Age: 83
End: 2023-03-08

## 2023-03-08 LAB
INR PPP: 2.1 RATIO
QUALITY CONTROL: YES

## 2023-03-08 NOTE — ED ADULT NURSE NOTE - PMH
COPD (chronic obstructive pulmonary disease)    DM (diabetes mellitus)    GIB (gastrointestinal bleeding)    Hiatal hernia    PAF (paroxysmal atrial fibrillation)  s/p ablation  Pericarditis    Shoulder fracture, right Staged Advancement Flap Text: The defect edges were debeveled with a #15 scalpel blade.  Given the location of the defect, shape of the defect and the proximity to free margins a staged advancement flap was deemed most appropriate.  Using a sterile surgical marker, an appropriate advancement flap was drawn incorporating the defect and placing the expected incisions within the relaxed skin tension lines where possible. The area thus outlined was incised deep to adipose tissue with a #15 scalpel blade.  The skin margins were undermined to an appropriate distance in all directions utilizing iris scissors.

## 2023-03-16 ENCOUNTER — APPOINTMENT (OUTPATIENT)
Dept: CARDIOLOGY | Facility: CLINIC | Age: 83
End: 2023-03-16
Payer: MEDICARE

## 2023-03-16 PROCEDURE — 93306 TTE W/DOPPLER COMPLETE: CPT

## 2023-03-23 ENCOUNTER — APPOINTMENT (OUTPATIENT)
Dept: CARDIOLOGY | Facility: CLINIC | Age: 83
End: 2023-03-23
Payer: MEDICARE

## 2023-03-23 PROCEDURE — 85610 PROTHROMBIN TIME: CPT | Mod: QW

## 2023-03-23 PROCEDURE — 93793 ANTICOAG MGMT PT WARFARIN: CPT

## 2023-03-27 LAB
INR PPP: 2.3 RATIO
QUALITY CONTROL: YES

## 2023-04-13 ENCOUNTER — APPOINTMENT (OUTPATIENT)
Dept: CARDIOLOGY | Facility: CLINIC | Age: 83
End: 2023-04-13
Payer: MEDICARE

## 2023-04-13 LAB
INR PPP: 2.4 RATIO
QUALITY CONTROL: YES

## 2023-04-13 PROCEDURE — 85610 PROTHROMBIN TIME: CPT | Mod: QW

## 2023-04-13 PROCEDURE — 93793 ANTICOAG MGMT PT WARFARIN: CPT

## 2023-04-21 ENCOUNTER — RX RENEWAL (OUTPATIENT)
Age: 83
End: 2023-04-21

## 2023-04-24 ENCOUNTER — APPOINTMENT (OUTPATIENT)
Dept: INTERNAL MEDICINE | Facility: CLINIC | Age: 83
End: 2023-04-24
Payer: MEDICARE

## 2023-04-24 ENCOUNTER — LABORATORY RESULT (OUTPATIENT)
Age: 83
End: 2023-04-24

## 2023-04-24 VITALS
WEIGHT: 176 LBS | HEIGHT: 63 IN | OXYGEN SATURATION: 94 % | BODY MASS INDEX: 31.18 KG/M2 | HEART RATE: 68 BPM | DIASTOLIC BLOOD PRESSURE: 76 MMHG | SYSTOLIC BLOOD PRESSURE: 118 MMHG | RESPIRATION RATE: 16 BRPM

## 2023-04-24 PROCEDURE — 99214 OFFICE O/P EST MOD 30 MIN: CPT

## 2023-04-24 NOTE — PHYSICAL EXAM
[No Acute Distress] : no acute distress [Well Nourished] : well nourished [Well Developed] : well developed [Well-Appearing] : well-appearing [Normal Voice/Communication] : normal voice/communication [Normal Sclera/Conjunctiva] : normal sclera/conjunctiva [PERRL] : pupils equal round and reactive to light [EOMI] : extraocular movements intact [Normal Outer Ear/Nose] : the outer ears and nose were normal in appearance [Normal Oropharynx] : the oropharynx was normal [No JVD] : no jugular venous distention [No Lymphadenopathy] : no lymphadenopathy [Supple] : supple [Thyroid Normal, No Nodules] : the thyroid was normal and there were no nodules present [No Respiratory Distress] : no respiratory distress  [No Accessory Muscle Use] : no accessory muscle use [Clear to Auscultation] : lungs were clear to auscultation bilaterally [Normal Rate] : normal rate  [Regular Rhythm] : with a regular rhythm [Normal S1, S2] : normal S1 and S2 [No Murmur] : no murmur heard [No Carotid Bruits] : no carotid bruits [No Abdominal Bruit] : a ~M bruit was not heard ~T in the abdomen [No Varicosities] : no varicosities [Pedal Pulses Present] : the pedal pulses are present [No Edema] : there was no peripheral edema [No Palpable Aorta] : no palpable aorta [No Extremity Clubbing/Cyanosis] : no extremity clubbing/cyanosis [Soft] : abdomen soft [Non Tender] : non-tender [Non-distended] : non-distended [No Masses] : no abdominal mass palpated [No HSM] : no HSM [Normal Bowel Sounds] : normal bowel sounds [Normal Supraclavicular Nodes] : no supraclavicular lymphadenopathy [Normal Posterior Cervical Nodes] : no posterior cervical lymphadenopathy [Normal Anterior Cervical Nodes] : no anterior cervical lymphadenopathy [No CVA Tenderness] : no CVA  tenderness [No Spinal Tenderness] : no spinal tenderness [Grossly Normal Strength/Tone] : grossly normal strength/tone [No Rash] : no rash [Coordination Grossly Intact] : coordination grossly intact [No Focal Deficits] : no focal deficits [Normal Gait] : normal gait [Deep Tendon Reflexes (DTR)] : deep tendon reflexes were 2+ and symmetric [Speech Grossly Normal] : speech grossly normal [Memory Grossly Normal] : memory grossly normal [Normal Affect] : the affect was normal [Alert and Oriented x3] : oriented to person, place, and time [Normal Mood] : the mood was normal [Normal Insight/Judgement] : insight and judgment were intact [de-identified] : tenderness of area around joints and swelling at left knee

## 2023-04-24 NOTE — HISTORY OF PRESENT ILLNESS
[Spouse] : spouse [FreeTextEntry8] : MARTINEZ JONES is a 82 year old F who presents today for general joint pain for two weeks. Pt has had swelling and pain in left leg, and it is difficult to walk on.

## 2023-04-24 NOTE — HEALTH RISK ASSESSMENT
[No] : In the past 12 months have you used drugs other than those required for medical reasons? No [No falls in past year] : Patient reported no falls in the past year [Assistive Device] : Patient uses an assistive device [0] : 2) Feeling down, depressed, or hopeless: Not at all (0) [PHQ-2 Negative - No further assessment needed] : PHQ-2 Negative - No further assessment needed [Former] : Former [> 15 Years] : > 15 Years [de-identified] : wheelchair  [[id number]] : 0

## 2023-04-24 NOTE — PLAN
[FreeTextEntry1] : continue medications \par start prednisone may have PMR\par sent to rheumatologist\par must take with food Prednisone\par continue Nexium\par  \par

## 2023-04-24 NOTE — END OF VISIT
[FreeTextEntry3] : "I, John German, personally scribed the services dictated to me by Dr. Severiano Rodas MD in this documentation on 04/24/2023 " \par \par "I Dr. Severiano Rodas MD, personally performed the services described in this documentation on 04/24/2023 for the patient as scribed by John German in my presence. I have reviewed and verified that all the information is accurate and true."

## 2023-04-25 LAB
ALBUMIN SERPL ELPH-MCNC: 3.9 G/DL
ALP BLD-CCNC: 57 U/L
ALT SERPL-CCNC: 8 U/L
ANION GAP SERPL CALC-SCNC: 12 MMOL/L
AST SERPL-CCNC: 7 U/L
BASOPHILS # BLD AUTO: 0.12 K/UL
BASOPHILS NFR BLD AUTO: 1.7 %
BILIRUB SERPL-MCNC: 0.3 MG/DL
BUN SERPL-MCNC: 31 MG/DL
CALCIUM SERPL-MCNC: 9.9 MG/DL
CHLORIDE SERPL-SCNC: 102 MMOL/L
CO2 SERPL-SCNC: 25 MMOL/L
CREAT SERPL-MCNC: 1.13 MG/DL
CRP SERPL-MCNC: 37 MG/L
EGFR: 49 ML/MIN/1.73M2
EOSINOPHIL # BLD AUTO: 0 K/UL
EOSINOPHIL NFR BLD AUTO: 0 %
ERYTHROCYTE [SEDIMENTATION RATE] IN BLOOD BY WESTERGREN METHOD: 14 MM/HR
GLUCOSE SERPL-MCNC: 86 MG/DL
HCT VFR BLD CALC: 29.1 %
HGB BLD-MCNC: 8.7 G/DL
LYMPHOCYTES # BLD AUTO: 1.32 K/UL
LYMPHOCYTES NFR BLD AUTO: 18.3 %
MAN DIFF?: NORMAL
MCHC RBC-ENTMCNC: 29.9 GM/DL
MCHC RBC-ENTMCNC: 31.4 PG
MCV RBC AUTO: 105.1 FL
MONOCYTES # BLD AUTO: 0.69 K/UL
MONOCYTES NFR BLD AUTO: 9.6 %
NEUTROPHILS # BLD AUTO: 4.95 K/UL
NEUTROPHILS NFR BLD AUTO: 66.9 %
PLATELET # BLD AUTO: 270 K/UL
POTASSIUM SERPL-SCNC: 5.3 MMOL/L
PROT SERPL-MCNC: 5.9 G/DL
RBC # BLD: 2.77 M/UL
RBC # FLD: 25.4 %
SODIUM SERPL-SCNC: 139 MMOL/L
WBC # FLD AUTO: 7.22 K/UL

## 2023-05-01 ENCOUNTER — RX RENEWAL (OUTPATIENT)
Age: 83
End: 2023-05-01

## 2023-05-01 RX ORDER — PREDNISONE 10 MG/1
10 TABLET ORAL DAILY
Qty: 40 | Refills: 0 | Status: ACTIVE | COMMUNITY
Start: 2023-04-24 | End: 1900-01-01

## 2023-05-08 ENCOUNTER — OUTPATIENT (OUTPATIENT)
Dept: OUTPATIENT SERVICES | Facility: HOSPITAL | Age: 83
LOS: 1 days | End: 2023-05-08
Payer: MEDICARE

## 2023-05-08 DIAGNOSIS — Z98.89 OTHER SPECIFIED POSTPROCEDURAL STATES: Chronic | ICD-10-CM

## 2023-05-08 DIAGNOSIS — Z95.828 PRESENCE OF OTHER VASCULAR IMPLANTS AND GRAFTS: Chronic | ICD-10-CM

## 2023-05-08 DIAGNOSIS — S72.001A FRACTURE OF UNSPECIFIED PART OF NECK OF RIGHT FEMUR, INITIAL ENCOUNTER FOR CLOSED FRACTURE: Chronic | ICD-10-CM

## 2023-05-08 DIAGNOSIS — Z90.710 ACQUIRED ABSENCE OF BOTH CERVIX AND UTERUS: Chronic | ICD-10-CM

## 2023-05-08 DIAGNOSIS — D46.Z OTHER MYELODYSPLASTIC SYNDROMES: ICD-10-CM

## 2023-05-08 DIAGNOSIS — Z98.890 OTHER SPECIFIED POSTPROCEDURAL STATES: Chronic | ICD-10-CM

## 2023-05-08 DIAGNOSIS — H26.40 UNSPECIFIED SECONDARY CATARACT: Chronic | ICD-10-CM

## 2023-05-09 ENCOUNTER — OUTPATIENT (OUTPATIENT)
Dept: OUTPATIENT SERVICES | Facility: HOSPITAL | Age: 83
LOS: 1 days | End: 2023-05-09
Payer: MEDICARE

## 2023-05-09 VITALS
HEART RATE: 71 BPM | TEMPERATURE: 98 F | RESPIRATION RATE: 18 BRPM | OXYGEN SATURATION: 98 % | SYSTOLIC BLOOD PRESSURE: 155 MMHG | DIASTOLIC BLOOD PRESSURE: 76 MMHG

## 2023-05-09 VITALS
TEMPERATURE: 98 F | RESPIRATION RATE: 18 BRPM | HEART RATE: 54 BPM | SYSTOLIC BLOOD PRESSURE: 158 MMHG | OXYGEN SATURATION: 98 % | DIASTOLIC BLOOD PRESSURE: 75 MMHG

## 2023-05-09 DIAGNOSIS — Z95.828 PRESENCE OF OTHER VASCULAR IMPLANTS AND GRAFTS: Chronic | ICD-10-CM

## 2023-05-09 DIAGNOSIS — Z98.89 OTHER SPECIFIED POSTPROCEDURAL STATES: Chronic | ICD-10-CM

## 2023-05-09 DIAGNOSIS — Z90.710 ACQUIRED ABSENCE OF BOTH CERVIX AND UTERUS: Chronic | ICD-10-CM

## 2023-05-09 DIAGNOSIS — Z98.890 OTHER SPECIFIED POSTPROCEDURAL STATES: Chronic | ICD-10-CM

## 2023-05-09 DIAGNOSIS — D63.1 ANEMIA IN CHRONIC KIDNEY DISEASE: ICD-10-CM

## 2023-05-09 DIAGNOSIS — S72.001A FRACTURE OF UNSPECIFIED PART OF NECK OF RIGHT FEMUR, INITIAL ENCOUNTER FOR CLOSED FRACTURE: Chronic | ICD-10-CM

## 2023-05-09 DIAGNOSIS — H26.40 UNSPECIFIED SECONDARY CATARACT: Chronic | ICD-10-CM

## 2023-05-09 PROCEDURE — P9016: CPT

## 2023-05-09 PROCEDURE — 86923 COMPATIBILITY TEST ELECTRIC: CPT

## 2023-05-09 PROCEDURE — 86850 RBC ANTIBODY SCREEN: CPT

## 2023-05-09 PROCEDURE — 36415 COLL VENOUS BLD VENIPUNCTURE: CPT

## 2023-05-09 PROCEDURE — 86901 BLOOD TYPING SEROLOGIC RH(D): CPT

## 2023-05-09 PROCEDURE — 36430 TRANSFUSION BLD/BLD COMPNT: CPT

## 2023-05-09 PROCEDURE — 85027 COMPLETE CBC AUTOMATED: CPT

## 2023-05-09 PROCEDURE — 86900 BLOOD TYPING SEROLOGIC ABO: CPT

## 2023-05-09 RX ORDER — ACETAMINOPHEN 500 MG
650 TABLET ORAL ONCE
Refills: 0 | Status: COMPLETED | OUTPATIENT
Start: 2023-05-09 | End: 2023-05-09

## 2023-05-09 RX ORDER — DIPHENHYDRAMINE HCL 50 MG
25 CAPSULE ORAL ONCE
Refills: 0 | Status: COMPLETED | OUTPATIENT
Start: 2023-05-09 | End: 2023-05-09

## 2023-05-09 RX ADMIN — Medication 300 UNIT(S): at 12:18

## 2023-05-09 RX ADMIN — Medication 25 MILLIGRAM(S): at 08:48

## 2023-05-09 RX ADMIN — Medication 650 MILLIGRAM(S): at 08:48

## 2023-05-09 NOTE — PROVIDER CONTACT NOTE (OTHER) - SITUATION
Pt here for outpatient infusion and has [facility]  No order for [facility]

## 2023-05-10 ENCOUNTER — APPOINTMENT (OUTPATIENT)
Dept: RHEUMATOLOGY | Facility: CLINIC | Age: 83
End: 2023-05-10
Payer: MEDICARE

## 2023-05-10 VITALS
WEIGHT: 175 LBS | BODY MASS INDEX: 29.16 KG/M2 | DIASTOLIC BLOOD PRESSURE: 60 MMHG | HEART RATE: 66 BPM | OXYGEN SATURATION: 96 % | HEIGHT: 65 IN | SYSTOLIC BLOOD PRESSURE: 120 MMHG

## 2023-05-10 PROCEDURE — 99204 OFFICE O/P NEW MOD 45 MIN: CPT

## 2023-05-24 ENCOUNTER — RX RENEWAL (OUTPATIENT)
Age: 83
End: 2023-05-24

## 2023-05-25 ENCOUNTER — APPOINTMENT (OUTPATIENT)
Dept: CARDIOLOGY | Facility: CLINIC | Age: 83
End: 2023-05-25
Payer: MEDICARE

## 2023-05-25 LAB
INR PPP: 3.6 RATIO
QUALITY CONTROL: YES

## 2023-05-25 PROCEDURE — 93793 ANTICOAG MGMT PT WARFARIN: CPT

## 2023-05-25 PROCEDURE — 85610 PROTHROMBIN TIME: CPT | Mod: QW

## 2023-05-31 ENCOUNTER — NON-APPOINTMENT (OUTPATIENT)
Age: 83
End: 2023-05-31

## 2023-06-01 ENCOUNTER — APPOINTMENT (OUTPATIENT)
Dept: CARDIOLOGY | Facility: CLINIC | Age: 83
End: 2023-06-01
Payer: MEDICARE

## 2023-06-01 PROCEDURE — 85610 PROTHROMBIN TIME: CPT | Mod: QW

## 2023-06-01 PROCEDURE — 93793 ANTICOAG MGMT PT WARFARIN: CPT

## 2023-06-02 LAB
INR PPP: 3.7 RATIO
QUALITY CONTROL: YES

## 2023-06-08 ENCOUNTER — APPOINTMENT (OUTPATIENT)
Dept: CARDIOLOGY | Facility: CLINIC | Age: 83
End: 2023-06-08
Payer: MEDICARE

## 2023-06-08 ENCOUNTER — NON-APPOINTMENT (OUTPATIENT)
Age: 83
End: 2023-06-08

## 2023-06-08 VITALS
WEIGHT: 170 LBS | OXYGEN SATURATION: 98 % | HEART RATE: 72 BPM | BODY MASS INDEX: 28.32 KG/M2 | HEIGHT: 65 IN | SYSTOLIC BLOOD PRESSURE: 123 MMHG | DIASTOLIC BLOOD PRESSURE: 75 MMHG

## 2023-06-08 LAB
INR PPP: 2.5 RATIO
QUALITY CONTROL: YES

## 2023-06-08 PROCEDURE — 93000 ELECTROCARDIOGRAM COMPLETE: CPT

## 2023-06-08 PROCEDURE — 99215 OFFICE O/P EST HI 40 MIN: CPT

## 2023-06-08 PROCEDURE — 85610 PROTHROMBIN TIME: CPT | Mod: QW

## 2023-06-11 NOTE — REVIEW OF SYSTEMS
[Feeling Fatigued] : not feeling fatigued [Cough] : no cough [FreeTextEntry2] : as per HPI [de-identified] : as per HPI- unchanged

## 2023-06-11 NOTE — DISCUSSION/SUMMARY
[FreeTextEntry1] : 81 year old woman with a history as listed above presents for a followup visit. \par Dee is feeling more orthostatic symptom since starting the prednisone. She will increase her midodrine to 5mg q8. I will tolerate a small level of permissive HTN.  \par \par She denies any anginal symptoms. Clinically she is not in decompensated heart failure.  Her physical exam was essentially unrevealing for any significant cardiovascular findings. Her EKG is unchanged from previous.  \par \par  She had a Ziopatch in 2/2021 that showed symptomatic PACs and PAT episodes. There was no AF seen. She did not have any signs of heart block or symptomatic bradycardia during the monitoring period.  Cardizem will be stopped today. She will continue  Amiodarone 100mg Qday Her last PFTs in 2/2023 showed severe reduction in diffusion capacity. This may be from Amio. Will have a repeat PFT in 3 months. She continue to follow up with Dr Gallegos. \par \par Her lipid profile is at goal. \par \par She will have her INR checked in our Coumadin Clinic. Her goal INR is 2-3 for CVA risk reduction and VTE prevention. \par She will followup with heme for her anemia and transfusions. \par \par Exercise and diet counseling was performed in order to reduce her future cardiovascular risk. \par I asked she return for follow up in 3 months.\par \par  \par   [EKG obtained to assist in diagnosis and management of assessed problem(s)] : EKG obtained to assist in diagnosis and management of assessed problem(s)

## 2023-06-11 NOTE — HISTORY OF PRESENT ILLNESS
[FreeTextEntry1] : 82 year old female with PMhx of AFib (on Warfarin), CKD, COPD, Anemia, DM, HTN, Hiatal Hernia, IBS, PAD, TMJ, Sinus Ermias who is here for Cardiac Follow up.\par \par Since her last visit, she was diagnosed with PMR and started on Prednisone 15mg day. She has noted that her dizziness has worsened since starting on the prednisone. Her lightheadedness is now occurring much more often with change of positions. She denies any LOC. She had a fall 1 week ago because she got dizzy with change of position but no LOC. She   denies any chest pain, PND, orthopnea, lower extremity edema,  strokelike symptoms. \par Medication reconciliation performed. She is compliant with her medications. \par She is loosing weight. She claims to hydrate. \par \par \par \par

## 2023-06-11 NOTE — CARDIOLOGY SUMMARY
[de-identified] : Sinus  Rhythm  \par -Nonspecific ST depression  -Nondiagnostic. \par  [de-identified] :  2/2021 that showed symptomatic PACs and PAT episodes. There was no AF seen.  [de-identified] : 4/2021 normal LV function w moderate LVH. moderate PHTN 55mmHG\par 3/2023 normal LV function LVH, mild PHTN

## 2023-06-21 ENCOUNTER — APPOINTMENT (OUTPATIENT)
Dept: RHEUMATOLOGY | Facility: CLINIC | Age: 83
End: 2023-06-21
Payer: MEDICARE

## 2023-06-21 VITALS
OXYGEN SATURATION: 100 % | SYSTOLIC BLOOD PRESSURE: 127 MMHG | WEIGHT: 175 LBS | HEART RATE: 60 BPM | DIASTOLIC BLOOD PRESSURE: 75 MMHG | HEIGHT: 65 IN | BODY MASS INDEX: 29.16 KG/M2

## 2023-06-21 PROCEDURE — 99214 OFFICE O/P EST MOD 30 MIN: CPT

## 2023-06-21 RX ORDER — MELOXICAM 7.5 MG/1
7.5 TABLET ORAL
Qty: 30 | Refills: 0 | Status: DISCONTINUED | COMMUNITY
Start: 2021-09-30 | End: 2023-06-21

## 2023-06-22 ENCOUNTER — APPOINTMENT (OUTPATIENT)
Dept: CARDIOLOGY | Facility: CLINIC | Age: 83
End: 2023-06-22
Payer: MEDICARE

## 2023-06-22 PROCEDURE — 93793 ANTICOAG MGMT PT WARFARIN: CPT

## 2023-06-22 PROCEDURE — 85610 PROTHROMBIN TIME: CPT | Mod: QW

## 2023-06-23 LAB
INR PPP: 1.9 RATIO
QUALITY CONTROL: YES

## 2023-07-07 ENCOUNTER — APPOINTMENT (OUTPATIENT)
Dept: CARDIOLOGY | Facility: CLINIC | Age: 83
End: 2023-07-07
Payer: MEDICARE

## 2023-07-07 VITALS
HEART RATE: 69 BPM | OXYGEN SATURATION: 96 % | DIASTOLIC BLOOD PRESSURE: 74 MMHG | SYSTOLIC BLOOD PRESSURE: 119 MMHG | HEIGHT: 65 IN

## 2023-07-07 DIAGNOSIS — R42 DIZZINESS AND GIDDINESS: ICD-10-CM

## 2023-07-07 DIAGNOSIS — R94.31 ABNORMAL ELECTROCARDIOGRAM [ECG] [EKG]: ICD-10-CM

## 2023-07-07 PROCEDURE — 99215 OFFICE O/P EST HI 40 MIN: CPT

## 2023-07-07 PROCEDURE — 93000 ELECTROCARDIOGRAM COMPLETE: CPT

## 2023-07-07 PROCEDURE — 85610 PROTHROMBIN TIME: CPT | Mod: QW

## 2023-07-10 PROBLEM — R42 DIZZINESS: Status: ACTIVE | Noted: 2017-08-14

## 2023-07-10 LAB
INR PPP: 1.8 RATIO
QUALITY CONTROL: YES

## 2023-07-10 NOTE — CARDIOLOGY SUMMARY
[de-identified] : Sinus  Rhythm  \par -Nonspecific ST depression  -Nondiagnostic. \par  [de-identified] :  2/2021 that showed symptomatic PACs and PAT episodes. There was no AF seen.  [de-identified] : 4/2021 normal LV function w moderate LVH. moderate PHTN 55mmHG\par 3/2023 normal LV function LVH, mild PHTN

## 2023-07-10 NOTE — DISCUSSION/SUMMARY
[EKG obtained to assist in diagnosis and management of assessed problem(s)] : EKG obtained to assist in diagnosis and management of assessed problem(s) [FreeTextEntry1] : 81 year old woman with a history as listed above presents for a followup visit. \par Dee is feeling more orthostatic symptom since starting the prednisone. They are improving while on Midodrine. She will increase her midodrine to 10mg q8. I will tolerate a small level of permissive HTN.  She should see neuro. \par \par She denies any anginal symptoms. Clinically she is not in decompensated heart failure.  Her physical exam was essentially unrevealing for any significant cardiovascular findings. Her EKG is unchanged from previous.  \par \par  She had a Ziopatch in 2/2021 that showed symptomatic PACs and PAT episodes. There was no AF seen. She did not have any signs of heart block or symptomatic bradycardia during the monitoring period.  Cardizem will be stopped today. She will continue  Amiodarone 100mg Qday Her last PFTs in 2/2023 showed severe reduction in diffusion capacity. This may be from Amio. Will have a repeat PFT in 3 months. She continue to follow up with Dr Gallegos. \par \par Her lipid profile is at goal. \par \par She will have her INR checked in our Coumadin Clinic. Her goal INR is 2-3 for CVA risk reduction and VTE prevention. \par She will followup with heme for her anemia and transfusions. \par \par Exercise and diet counseling was performed in order to reduce her future cardiovascular risk. \par I asked she return for follow up in 3 months.\par \par  \par

## 2023-07-10 NOTE — REVIEW OF SYSTEMS
[Dizziness] : dizziness [Negative] : Heme/Lymph [Feeling Fatigued] : not feeling fatigued [Cough] : no cough [FreeTextEntry2] : as per HPI [de-identified] : as per HPI- unchanged

## 2023-07-10 NOTE — HISTORY OF PRESENT ILLNESS
[FreeTextEntry1] : 82 year old female with PMhx of AFib (on Warfarin), CKD, COPD, Anemia, DM, HTN, Hiatal Hernia, IBS, PAD, TMJ, Sinus Ermias who is here for Cardiac Follow up.\par \par Since her last visit, she was diagnosed with PMR and  on Prednisone 10mg day. She has noted that her dizziness has worsened since starting on the prednisone. Her lightheadedness is now occurring much more often with change of positions. She denies any LOC. Her episodes are now down to 2-3 times a day and happening on change of position.  She   denies any chest pain, PND, orthopnea, lower extremity edema,  strokelike symptoms. \par Medication reconciliation performed. She is compliant with her medications. \par She is loosing weight. She claims to hydrate. \par \par \par \par

## 2023-07-20 ENCOUNTER — APPOINTMENT (OUTPATIENT)
Dept: CARDIOLOGY | Facility: CLINIC | Age: 83
End: 2023-07-20
Payer: MEDICARE

## 2023-07-20 LAB
INR PPP: 1.7 RATIO
QUALITY CONTROL: YES

## 2023-07-20 PROCEDURE — 85610 PROTHROMBIN TIME: CPT | Mod: QW

## 2023-07-20 PROCEDURE — 93793 ANTICOAG MGMT PT WARFARIN: CPT

## 2023-07-27 ENCOUNTER — APPOINTMENT (OUTPATIENT)
Dept: CARDIOLOGY | Facility: CLINIC | Age: 83
End: 2023-07-27
Payer: MEDICARE

## 2023-07-27 LAB
INR PPP: 2.1 RATIO
QUALITY CONTROL: YES

## 2023-07-27 PROCEDURE — 93793 ANTICOAG MGMT PT WARFARIN: CPT

## 2023-07-27 PROCEDURE — 85610 PROTHROMBIN TIME: CPT | Mod: QW

## 2023-08-09 ENCOUNTER — RX RENEWAL (OUTPATIENT)
Age: 83
End: 2023-08-09

## 2023-08-10 ENCOUNTER — APPOINTMENT (OUTPATIENT)
Dept: CARDIOLOGY | Facility: CLINIC | Age: 83
End: 2023-08-10
Payer: MEDICARE

## 2023-08-10 LAB
INR PPP: 1.8 RATIO
QUALITY CONTROL: YES

## 2023-08-10 PROCEDURE — 85610 PROTHROMBIN TIME: CPT | Mod: QW

## 2023-08-10 PROCEDURE — 93793 ANTICOAG MGMT PT WARFARIN: CPT

## 2023-08-16 ENCOUNTER — APPOINTMENT (OUTPATIENT)
Dept: RHEUMATOLOGY | Facility: CLINIC | Age: 83
End: 2023-08-16
Payer: MEDICARE

## 2023-08-16 VITALS
DIASTOLIC BLOOD PRESSURE: 73 MMHG | WEIGHT: 175 LBS | HEIGHT: 65 IN | HEART RATE: 61 BPM | SYSTOLIC BLOOD PRESSURE: 125 MMHG | OXYGEN SATURATION: 98 % | BODY MASS INDEX: 29.16 KG/M2

## 2023-08-16 PROCEDURE — 99214 OFFICE O/P EST MOD 30 MIN: CPT

## 2023-08-16 RX ORDER — PREDNISONE 5 MG/1
5 TABLET ORAL
Qty: 180 | Refills: 1 | Status: ACTIVE | COMMUNITY
Start: 2023-06-21 | End: 1900-01-01

## 2023-08-17 NOTE — ED ADULT NURSE NOTE - CARDIO WDL
Pt called stating her up legs/thighs have been swelling very badly lately, and they're starting to weep some fluid. She is wondering if she can up the dose of her diuretic?  Please advise.   Normal rate, regular rhythm, normal S1, S2 heart sounds heard.

## 2023-08-20 ENCOUNTER — EMERGENCY (EMERGENCY)
Facility: HOSPITAL | Age: 83
LOS: 1 days | Discharge: HOME | End: 2023-08-20
Attending: STUDENT IN AN ORGANIZED HEALTH CARE EDUCATION/TRAINING PROGRAM | Admitting: STUDENT IN AN ORGANIZED HEALTH CARE EDUCATION/TRAINING PROGRAM
Payer: MEDICARE

## 2023-08-20 VITALS
TEMPERATURE: 98 F | OXYGEN SATURATION: 97 % | RESPIRATION RATE: 17 BRPM | DIASTOLIC BLOOD PRESSURE: 70 MMHG | HEART RATE: 70 BPM | SYSTOLIC BLOOD PRESSURE: 118 MMHG

## 2023-08-20 VITALS
OXYGEN SATURATION: 97 % | RESPIRATION RATE: 19 BRPM | WEIGHT: 175.05 LBS | SYSTOLIC BLOOD PRESSURE: 124 MMHG | HEART RATE: 71 BPM | DIASTOLIC BLOOD PRESSURE: 72 MMHG | TEMPERATURE: 98 F | HEIGHT: 64 IN

## 2023-08-20 DIAGNOSIS — S72.001A FRACTURE OF UNSPECIFIED PART OF NECK OF RIGHT FEMUR, INITIAL ENCOUNTER FOR CLOSED FRACTURE: Chronic | ICD-10-CM

## 2023-08-20 DIAGNOSIS — Z95.828 PRESENCE OF OTHER VASCULAR IMPLANTS AND GRAFTS: Chronic | ICD-10-CM

## 2023-08-20 DIAGNOSIS — Z98.89 OTHER SPECIFIED POSTPROCEDURAL STATES: Chronic | ICD-10-CM

## 2023-08-20 DIAGNOSIS — Z98.890 OTHER SPECIFIED POSTPROCEDURAL STATES: Chronic | ICD-10-CM

## 2023-08-20 DIAGNOSIS — H26.40 UNSPECIFIED SECONDARY CATARACT: Chronic | ICD-10-CM

## 2023-08-20 DIAGNOSIS — Z90.710 ACQUIRED ABSENCE OF BOTH CERVIX AND UTERUS: Chronic | ICD-10-CM

## 2023-08-20 PROCEDURE — 72170 X-RAY EXAM OF PELVIS: CPT | Mod: 26

## 2023-08-20 PROCEDURE — 72170 X-RAY EXAM OF PELVIS: CPT

## 2023-08-20 PROCEDURE — 99283 EMERGENCY DEPT VISIT LOW MDM: CPT | Mod: 25

## 2023-08-20 PROCEDURE — 99284 EMERGENCY DEPT VISIT MOD MDM: CPT

## 2023-08-20 RX ORDER — METHOCARBAMOL 500 MG/1
1000 TABLET, FILM COATED ORAL ONCE
Refills: 0 | Status: COMPLETED | OUTPATIENT
Start: 2023-08-20 | End: 2023-08-20

## 2023-08-20 RX ORDER — LIDOCAINE 4 G/100G
1 CREAM TOPICAL
Qty: 2 | Refills: 0
Start: 2023-08-20 | End: 2023-08-29

## 2023-08-20 RX ORDER — ACETAMINOPHEN 500 MG
975 TABLET ORAL ONCE
Refills: 0 | Status: COMPLETED | OUTPATIENT
Start: 2023-08-20 | End: 2023-08-20

## 2023-08-20 RX ORDER — LIDOCAINE 4 G/100G
1 CREAM TOPICAL ONCE
Refills: 0 | Status: COMPLETED | OUTPATIENT
Start: 2023-08-20 | End: 2023-08-20

## 2023-08-20 RX ORDER — METHOCARBAMOL 500 MG/1
2 TABLET, FILM COATED ORAL
Qty: 18 | Refills: 0
Start: 2023-08-20 | End: 2023-08-22

## 2023-08-20 RX ADMIN — Medication 975 MILLIGRAM(S): at 15:25

## 2023-08-20 RX ADMIN — LIDOCAINE 1 PATCH: 4 CREAM TOPICAL at 14:30

## 2023-08-20 RX ADMIN — METHOCARBAMOL 1000 MILLIGRAM(S): 500 TABLET, FILM COATED ORAL at 14:30

## 2023-08-20 RX ADMIN — Medication 975 MILLIGRAM(S): at 14:30

## 2023-08-20 NOTE — ED ADULT NURSE NOTE - NSFALLHARMRISKINTERV_ED_ALL_ED

## 2023-08-20 NOTE — ED ADULT NURSE NOTE - OBJECTIVE STATEMENT
Patient is 81yo F BIB daughter with c/o fall on Friday, no head strike or LOC, landed on left side, but patient now with severe right sided buttock and back pain. Patient was brought in via wheelchair, reports she normally does not require any assistive devices to walk. Patient reports she takes tramadol for her normal pain management regimen but it has not helped her pain at all. Patient is 81yo F BIB daughter with c/o fall on Friday, no head strike or LOC, landed on left side, but patient now with severe right sided buttock/hip area and back pain. Patient was brought in via wheelchair, reports she has been ambulating with walker since the fall on Friday, reports she normally does not require any assistive devices to walk. Patient reports she takes tramadol for her normal pain management regimen but it has not helped her pain at all.

## 2023-08-20 NOTE — ED PROVIDER NOTE - OBJECTIVE STATEMENT
83 y/o F hx of DM2 (no longer current), HTN, GERD, COPD not on home O2, pAfib s/p ablation currently on Coumadin, Aplastic Anemia, CKD, HFpEF presenting with R back pain s/p fall    States that fall occurred two days prior and now feels pain in R hip/back region that is exacerbation with rotation of hip. Bearing weight on extremity but notes having to use her walker to maintain balance. At baseline, has osteonecrosis of b/l hips and takes tramadol. Took acetaminophen this morning without relief. No fevers/chills. No abdominal pain or focal spine tenderness. 81 y/o F hx of DM2 (no longer current), HTN, GERD, COPD not on home O2, pAfib s/p ablation currently on Coumadin, Aplastic Anemia, CKD, HFpEF presenting with R back pain s/p fall    States that fall occurred two days prior and now feels pain in R hip/back region that is exacerbation with rotation of hip. Bearing weight on extremity but notes having to use her walker to maintain balance. At baseline, has osteonecrosis of b/l hips and takes tramadol and is s/p IM haydee of right femur. Took acetaminophen this morning without relief. No fevers/chills. No abdominal pain or focal spine tenderness. 81 y/o F hx of DM2 (no longer current), HTN, GERD, COPD not on home O2, pAfib s/p ablation currently on Coumadin, Aplastic Anemia, CKD, HFpEF presenting with R back pain s/p fall    States that fall occurred two days prior and now feels pain in R hip/oblique region that is exacerbated with rotation of hip. Bearing weight on extremity but notes having to use her walker to maintain balance. At baseline, has osteonecrosis of b/l hips and takes tramadol and is s/p IM haydee of right femur. Took acetaminophen this morning without relief. No fevers/chills. No abdominal pain or focal spine tenderness. No bladder/bowel dysfunction or perineal numbness. 81 y/o F hx of DM2 (no longer current), HTN, GERD, COPD not on home O2, pAfib s/p ablation currently on Coumadin, Aplastic Anemia, CKD, HFpEF presenting with R back pain s/p fall    States that fall occurred two days prior and now feels pain in R hip/oblique region that is exacerbated with rotation of hip. Denies any head trauma. Bearing weight on extremity but notes having to use her walker to maintain balance. At baseline, has osteonecrosis of b/l hips and takes tramadol and is s/p IM haydee of right femur. Took acetaminophen this morning without relief. No fevers/chills. No abdominal pain or focal spine tenderness. No bladder/bowel dysfunction or perineal numbness. States INR has been in therapeutic range and check frequently.

## 2023-08-20 NOTE — ED PROVIDER NOTE - PHYSICAL EXAMINATION
GENERAL: no acute distress; mildly uncomfortable from pain   HEAD:  Atraumatic, Normocephalic  EYES: EOMI, PERRLA,   ENT: MMM; oropharynx clear  NECK: Supple, No JVD  CHEST/LUNG: Clear to auscultation bilaterally; No wheeze  HEART: Regular rate and rhythm; No murmurs, rubs, or gallops  ABDOMEN: Soft, Nontender, Nondistended; Bowel sounds present  EXTREMITIES:  2+ Peripheral Pulses, FROM of b/l hips passively but notable pain reproduced in right oblique with ranging right hip.  BACK: no point spinal tenderness or step offs.   PSYCH: AAOx3  NEUROLOGY: no focal motor or sensory deficits. 5/5 muscle strength in all extremities.   SKIN: No rashes or lesions
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
## 2023-08-20 NOTE — ED PROVIDER NOTE - NSFOLLOWUPINSTRUCTIONS_ED_ALL_ED_FT
Please take acetaminophen and tramadol as well as use the lidocaine patch and take Robaxin every 8 hours as needed for pain   Follow up with your primary care doctor   Return to the emergency department for worsening symptom such as leg weakness or inability to control bladder or bowel function.

## 2023-08-20 NOTE — ED PROVIDER NOTE - PROGRESS NOTE DETAILS
Patient feels much improved. Return precautions discussed-to follow up with PCP. Will send robaxin and lidocaine patch.

## 2023-08-20 NOTE — ED PROVIDER NOTE - PATIENT PORTAL LINK FT
You can access the FollowMyHealth Patient Portal offered by [facility] by registering at the following website: [email or web address]. By joining [employer]’s FollowMyHealth portal, you will also be able to view your health information using other applications (apps) compatible with our system.

## 2023-08-20 NOTE — ED PROVIDER NOTE - CLINICAL SUMMARY MEDICAL DECISION MAKING FREE TEXT BOX
81 y/o F hx of DM2 (no longer current), HTN, GERD, COPD not on home O2, pAfib s/p ablation currently on Coumadin, Aplastic Anemia, CKD, HFpEF presenting with R back pain s/p fall  Pain is in right lateral/oblique back musculature on right and worsened with rotation of b/l hips. R>L  Suspect paraspinal muscle strain/spasm r/o referred pain from R hip.   There is no tenderness in vertebral column or immediately adjacent to suggest acute compression fracture. No neurological deficits.   Plan films of R hip unremarkable.   Treat with analgesics and muscle relaxants   Return precautions discussed

## 2023-08-23 ENCOUNTER — APPOINTMENT (OUTPATIENT)
Dept: INTERNAL MEDICINE | Facility: CLINIC | Age: 83
End: 2023-08-23
Payer: MEDICARE

## 2023-08-23 VITALS
TEMPERATURE: 98.6 F | SYSTOLIC BLOOD PRESSURE: 140 MMHG | RESPIRATION RATE: 15 BRPM | DIASTOLIC BLOOD PRESSURE: 70 MMHG | OXYGEN SATURATION: 97 % | HEART RATE: 75 BPM | HEIGHT: 65 IN

## 2023-08-23 DIAGNOSIS — M26.609 UNSPECIFIED TEMPOROMANDIBULAR JOINT DISORDER: ICD-10-CM

## 2023-08-23 DIAGNOSIS — J44.9 CHRONIC OBSTRUCTIVE PULMONARY DISEASE, UNSPECIFIED: ICD-10-CM

## 2023-08-23 DIAGNOSIS — M54.9 DORSALGIA, UNSPECIFIED: ICD-10-CM

## 2023-08-23 PROCEDURE — 99214 OFFICE O/P EST MOD 30 MIN: CPT

## 2023-08-23 RX ORDER — OXYCODONE AND ACETAMINOPHEN 5; 325 MG/1; MG/1
5-325 TABLET ORAL
Qty: 20 | Refills: 0 | Status: ACTIVE | COMMUNITY
Start: 2023-08-23 | End: 1900-01-01

## 2023-08-23 NOTE — HEALTH RISK ASSESSMENT
[Never (0 pts)] : Never (0 points) [No] : In the past 12 months have you used drugs other than those required for medical reasons? No [Any fall with injury in past year] : Patient reported fall with injury in the past year [Assistive Device] : Patient uses an assistive device [Little interest or pleasure doing things] : 1) Little interest or pleasure doing things [Feeling down, depressed, or hopeless] : 2) Feeling down, depressed, or hopeless [0] : 2) Feeling down, depressed, or hopeless: Not at all (0) [PHQ-2 Negative - No further assessment needed] : PHQ-2 Negative - No further assessment needed [Never] : Never [de-identified] : Wheel chair [[id number]] : 0

## 2023-08-23 NOTE — PHYSICAL EXAM
[No Acute Distress] : no acute distress [Well Nourished] : well nourished [Well Developed] : well developed [Well-Appearing] : well-appearing [Normal Voice/Communication] : normal voice/communication [Normal Sclera/Conjunctiva] : normal sclera/conjunctiva [PERRL] : pupils equal round and reactive to light [EOMI] : extraocular movements intact [Normal Outer Ear/Nose] : the outer ears and nose were normal in appearance [Normal Oropharynx] : the oropharynx was normal [Normal TMs] : both tympanic membranes were normal [No JVD] : no jugular venous distention [No Lymphadenopathy] : no lymphadenopathy [Supple] : supple [Thyroid Normal, No Nodules] : the thyroid was normal and there were no nodules present [No Respiratory Distress] : no respiratory distress  [No Accessory Muscle Use] : no accessory muscle use [Clear to Auscultation] : lungs were clear to auscultation bilaterally [Normal Rate] : normal rate  [Regular Rhythm] : with a regular rhythm [Normal S1, S2] : normal S1 and S2 [No Murmur] : no murmur heard [No Carotid Bruits] : no carotid bruits [No Abdominal Bruit] : a ~M bruit was not heard ~T in the abdomen [No Varicosities] : no varicosities [Pedal Pulses Present] : the pedal pulses are present [No Edema] : there was no peripheral edema [No Palpable Aorta] : no palpable aorta [No Extremity Clubbing/Cyanosis] : no extremity clubbing/cyanosis [Soft] : abdomen soft [Non Tender] : non-tender [Non-distended] : non-distended [No Masses] : no abdominal mass palpated [No HSM] : no HSM [Normal Bowel Sounds] : normal bowel sounds [Normal Posterior Cervical Nodes] : no posterior cervical lymphadenopathy [Normal Anterior Cervical Nodes] : no anterior cervical lymphadenopathy [No CVA Tenderness] : no CVA  tenderness [No Spinal Tenderness] : no spinal tenderness [No Joint Swelling] : no joint swelling [Grossly Normal Strength/Tone] : grossly normal strength/tone [No Rash] : no rash [Coordination Grossly Intact] : coordination grossly intact [No Focal Deficits] : no focal deficits [Normal Gait] : normal gait [Deep Tendon Reflexes (DTR)] : deep tendon reflexes were 2+ and symmetric [Speech Grossly Normal] : speech grossly normal [Memory Grossly Normal] : memory grossly normal [Normal Affect] : the affect was normal [Alert and Oriented x3] : oriented to person, place, and time [Normal Mood] : the mood was normal [Normal Insight/Judgement] : insight and judgment were intact [de-identified] : left jaw tenderness [de-identified] : mid backpain Thoracic

## 2023-08-23 NOTE — END OF VISIT
[FreeTextEntry3] : "I, Harris Richards, personally scribed the services dictated to me by Dr. Severiano Rodas MD in this documentation on 08/23/2023 "    "I Dr. Severiano Rodas MD, personally performed the services described in this documentation on 08/23/2023 for the patient as scribed by Harris Richards in my presence. I have reviewed and verified that all the information is accurate and true."

## 2023-08-23 NOTE — PLAN
[FreeTextEntry1] : continue medications sent for MRI back Thoracic start Percocet PRN continue muscle relaxant  hold Tramadol

## 2023-08-23 NOTE — HISTORY OF PRESENT ILLNESS
[Family Member] : family member [FreeTextEntry1] : follow up [de-identified] : MARTINEZ JONES is a 82 year old F who presents today for a follow up after being discharged from the ER due to a fall that occurred last week . Pt has a hx of DM and HTN COPD Pt reports being in intense pain IN her back due to the fall and reports difficulties getting in and out of bed. Pt was sent for X-rays by pain management. has compression fracture  Pt reports discomfort in her left ear.

## 2023-08-24 ENCOUNTER — APPOINTMENT (OUTPATIENT)
Dept: CARDIOLOGY | Facility: CLINIC | Age: 83
End: 2023-08-24
Payer: MEDICARE

## 2023-08-24 LAB
INR PPP: 3.8 RATIO
QUALITY CONTROL: YES

## 2023-08-24 PROCEDURE — 85610 PROTHROMBIN TIME: CPT | Mod: QW

## 2023-08-24 PROCEDURE — 93793 ANTICOAG MGMT PT WARFARIN: CPT

## 2023-08-31 ENCOUNTER — APPOINTMENT (OUTPATIENT)
Dept: ORTHOPEDIC SURGERY | Facility: CLINIC | Age: 83
End: 2023-08-31
Payer: MEDICARE

## 2023-08-31 ENCOUNTER — APPOINTMENT (OUTPATIENT)
Dept: CARDIOLOGY | Facility: CLINIC | Age: 83
End: 2023-08-31
Payer: MEDICARE

## 2023-08-31 VITALS — HEIGHT: 65 IN | WEIGHT: 175 LBS | BODY MASS INDEX: 29.16 KG/M2

## 2023-08-31 LAB
INR PPP: 1.8 RATIO
QUALITY CONTROL: YES

## 2023-08-31 PROCEDURE — 93793 ANTICOAG MGMT PT WARFARIN: CPT

## 2023-08-31 PROCEDURE — 72100 X-RAY EXAM L-S SPINE 2/3 VWS: CPT

## 2023-08-31 PROCEDURE — 72170 X-RAY EXAM OF PELVIS: CPT

## 2023-08-31 PROCEDURE — 72070 X-RAY EXAM THORAC SPINE 2VWS: CPT

## 2023-08-31 PROCEDURE — 85610 PROTHROMBIN TIME: CPT | Mod: QW

## 2023-08-31 PROCEDURE — 99214 OFFICE O/P EST MOD 30 MIN: CPT

## 2023-08-31 PROCEDURE — 99204 OFFICE O/P NEW MOD 45 MIN: CPT

## 2023-08-31 RX ORDER — OXYCODONE AND ACETAMINOPHEN 10; 325 MG/1; MG/1
10-325 TABLET ORAL
Refills: 0 | Status: ACTIVE | COMMUNITY
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
## 2023-08-31 NOTE — DISCUSSION/SUMMARY
[de-identified] : reviewed the case and the imaging with her  L2 acute fracture on the Thoracic MRi noted  hard to say if thats actually L2 on the T spine - she needs to have a lumbar mRI in order to confirm the level (s) either way there is certainly an acute fracture and she is in severe pain  she tried taking oxy and now is severely constipated so I dont think she should take any more narcotics  - also she cant take nsaids  discussion of tx options with her   needs a kyphoplasty at L2  we dicussed this today in length  given her medical condition would need to be done in the hospital  impaired healing capacity otherwise as is steroid dependent so i dont think conservative treatment makes any sense for her  indicated for mri L spine to confirm the level will look into booking she is going to work on clearance  questions answered

## 2023-09-01 ENCOUNTER — RESULT REVIEW (OUTPATIENT)
Age: 83
End: 2023-09-01

## 2023-09-02 ENCOUNTER — EMERGENCY (EMERGENCY)
Facility: HOSPITAL | Age: 83
LOS: 1 days | Discharge: HOME | End: 2023-09-02
Attending: EMERGENCY MEDICINE | Admitting: EMERGENCY MEDICINE
Payer: MEDICARE

## 2023-09-02 VITALS
WEIGHT: 175.05 LBS | DIASTOLIC BLOOD PRESSURE: 64 MMHG | HEART RATE: 86 BPM | RESPIRATION RATE: 18 BRPM | TEMPERATURE: 98 F | SYSTOLIC BLOOD PRESSURE: 114 MMHG | HEIGHT: 64 IN | OXYGEN SATURATION: 96 %

## 2023-09-02 VITALS
DIASTOLIC BLOOD PRESSURE: 71 MMHG | TEMPERATURE: 98 F | HEART RATE: 77 BPM | OXYGEN SATURATION: 95 % | SYSTOLIC BLOOD PRESSURE: 126 MMHG | RESPIRATION RATE: 18 BRPM

## 2023-09-02 DIAGNOSIS — H26.40 UNSPECIFIED SECONDARY CATARACT: Chronic | ICD-10-CM

## 2023-09-02 DIAGNOSIS — S72.001A FRACTURE OF UNSPECIFIED PART OF NECK OF RIGHT FEMUR, INITIAL ENCOUNTER FOR CLOSED FRACTURE: Chronic | ICD-10-CM

## 2023-09-02 DIAGNOSIS — Z98.89 OTHER SPECIFIED POSTPROCEDURAL STATES: Chronic | ICD-10-CM

## 2023-09-02 DIAGNOSIS — Z98.890 OTHER SPECIFIED POSTPROCEDURAL STATES: Chronic | ICD-10-CM

## 2023-09-02 DIAGNOSIS — Z95.828 PRESENCE OF OTHER VASCULAR IMPLANTS AND GRAFTS: Chronic | ICD-10-CM

## 2023-09-02 DIAGNOSIS — Z90.710 ACQUIRED ABSENCE OF BOTH CERVIX AND UTERUS: Chronic | ICD-10-CM

## 2023-09-02 LAB
ALBUMIN SERPL ELPH-MCNC: 3.2 G/DL — LOW (ref 3.3–5)
ALP SERPL-CCNC: 63 U/L — SIGNIFICANT CHANGE UP (ref 40–120)
ALT FLD-CCNC: 16 U/L — SIGNIFICANT CHANGE UP (ref 12–78)
ANION GAP SERPL CALC-SCNC: 6 MMOL/L — SIGNIFICANT CHANGE UP (ref 5–17)
AST SERPL-CCNC: 6 U/L — LOW (ref 15–37)
BASOPHILS # BLD AUTO: 0 K/UL — SIGNIFICANT CHANGE UP (ref 0–0.2)
BASOPHILS NFR BLD AUTO: 0 % — SIGNIFICANT CHANGE UP (ref 0–2)
BILIRUB SERPL-MCNC: 0.8 MG/DL — SIGNIFICANT CHANGE UP (ref 0.2–1.2)
BUN SERPL-MCNC: 30 MG/DL — HIGH (ref 7–23)
CALCIUM SERPL-MCNC: 9.6 MG/DL — SIGNIFICANT CHANGE UP (ref 8.5–10.1)
CHLORIDE SERPL-SCNC: 103 MMOL/L — SIGNIFICANT CHANGE UP (ref 96–108)
CO2 SERPL-SCNC: 28 MMOL/L — SIGNIFICANT CHANGE UP (ref 22–31)
CREAT SERPL-MCNC: 1.4 MG/DL — HIGH (ref 0.5–1.3)
EGFR: 38 ML/MIN/1.73M2 — LOW
EOSINOPHIL # BLD AUTO: 0.09 K/UL — SIGNIFICANT CHANGE UP (ref 0–0.5)
EOSINOPHIL NFR BLD AUTO: 1 % — SIGNIFICANT CHANGE UP (ref 0–6)
GLUCOSE SERPL-MCNC: 152 MG/DL — HIGH (ref 70–99)
HCT VFR BLD CALC: 32 % — LOW (ref 34.5–45)
HGB BLD-MCNC: 9.7 G/DL — LOW (ref 11.5–15.5)
LACTATE SERPL-SCNC: 2 MMOL/L — SIGNIFICANT CHANGE UP (ref 0.7–2)
LYMPHOCYTES # BLD AUTO: 1.12 K/UL — SIGNIFICANT CHANGE UP (ref 1–3.3)
LYMPHOCYTES # BLD AUTO: 12 % — LOW (ref 13–44)
MCHC RBC-ENTMCNC: 30.3 GM/DL — LOW (ref 32–36)
MCHC RBC-ENTMCNC: 33.7 PG — SIGNIFICANT CHANGE UP (ref 27–34)
MCV RBC AUTO: 111.1 FL — HIGH (ref 80–100)
MONOCYTES # BLD AUTO: 0.94 K/UL — HIGH (ref 0–0.9)
MONOCYTES NFR BLD AUTO: 10 % — SIGNIFICANT CHANGE UP (ref 2–14)
NEUTROPHILS # BLD AUTO: 7.21 K/UL — SIGNIFICANT CHANGE UP (ref 1.8–7.4)
NEUTROPHILS NFR BLD AUTO: 68 % — SIGNIFICANT CHANGE UP (ref 43–77)
NT-PROBNP SERPL-SCNC: 2504 PG/ML — HIGH (ref 0–450)
PLATELET # BLD AUTO: 298 K/UL — SIGNIFICANT CHANGE UP (ref 150–400)
POTASSIUM SERPL-MCNC: 4.5 MMOL/L — SIGNIFICANT CHANGE UP (ref 3.5–5.3)
POTASSIUM SERPL-SCNC: 4.5 MMOL/L — SIGNIFICANT CHANGE UP (ref 3.5–5.3)
PROT SERPL-MCNC: 6.9 G/DL — SIGNIFICANT CHANGE UP (ref 6–8.3)
RAPID RVP RESULT: SIGNIFICANT CHANGE UP
RBC # BLD: 2.88 M/UL — LOW (ref 3.8–5.2)
RBC # FLD: 23.9 % — HIGH (ref 10.3–14.5)
SARS-COV-2 RNA SPEC QL NAA+PROBE: SIGNIFICANT CHANGE UP
SODIUM SERPL-SCNC: 137 MMOL/L — SIGNIFICANT CHANGE UP (ref 135–145)
WBC # BLD: 9.37 K/UL — SIGNIFICANT CHANGE UP (ref 3.8–10.5)
WBC # FLD AUTO: 9.37 K/UL — SIGNIFICANT CHANGE UP (ref 3.8–10.5)

## 2023-09-02 PROCEDURE — 94640 AIRWAY INHALATION TREATMENT: CPT

## 2023-09-02 PROCEDURE — 96374 THER/PROPH/DIAG INJ IV PUSH: CPT

## 2023-09-02 PROCEDURE — 71250 CT THORAX DX C-: CPT | Mod: MA

## 2023-09-02 PROCEDURE — 96375 TX/PRO/DX INJ NEW DRUG ADDON: CPT

## 2023-09-02 PROCEDURE — 93005 ELECTROCARDIOGRAM TRACING: CPT

## 2023-09-02 PROCEDURE — 71045 X-RAY EXAM CHEST 1 VIEW: CPT

## 2023-09-02 PROCEDURE — 71250 CT THORAX DX C-: CPT | Mod: 26,MA

## 2023-09-02 PROCEDURE — 83605 ASSAY OF LACTIC ACID: CPT

## 2023-09-02 PROCEDURE — 80053 COMPREHEN METABOLIC PANEL: CPT

## 2023-09-02 PROCEDURE — 83880 ASSAY OF NATRIURETIC PEPTIDE: CPT

## 2023-09-02 PROCEDURE — 93010 ELECTROCARDIOGRAM REPORT: CPT

## 2023-09-02 PROCEDURE — 71045 X-RAY EXAM CHEST 1 VIEW: CPT | Mod: 26

## 2023-09-02 PROCEDURE — 87040 BLOOD CULTURE FOR BACTERIA: CPT

## 2023-09-02 PROCEDURE — 0225U NFCT DS DNA&RNA 21 SARSCOV2: CPT

## 2023-09-02 PROCEDURE — 99285 EMERGENCY DEPT VISIT HI MDM: CPT | Mod: 25

## 2023-09-02 PROCEDURE — 99285 EMERGENCY DEPT VISIT HI MDM: CPT | Mod: FS

## 2023-09-02 PROCEDURE — 36415 COLL VENOUS BLD VENIPUNCTURE: CPT

## 2023-09-02 PROCEDURE — 85025 COMPLETE CBC W/AUTO DIFF WBC: CPT

## 2023-09-02 RX ORDER — IPRATROPIUM/ALBUTEROL SULFATE 18-103MCG
3 AEROSOL WITH ADAPTER (GRAM) INHALATION ONCE
Refills: 0 | Status: COMPLETED | OUTPATIENT
Start: 2023-09-02 | End: 2023-09-02

## 2023-09-02 RX ORDER — HYDROCODONE BITARTRATE AND HOMATROPINE METHYLBROMIDE 5; 1.5 MG/5ML; MG/5ML
5 SOLUTION ORAL
Qty: 60 | Refills: 0
Start: 2023-09-02 | End: 2023-09-05

## 2023-09-02 RX ORDER — PIPERACILLIN AND TAZOBACTAM 4; .5 G/20ML; G/20ML
3.38 INJECTION, POWDER, LYOPHILIZED, FOR SOLUTION INTRAVENOUS ONCE
Refills: 0 | Status: COMPLETED | OUTPATIENT
Start: 2023-09-02 | End: 2023-09-02

## 2023-09-02 RX ORDER — METHOCARBAMOL 500 MG/1
1 TABLET, FILM COATED ORAL
Qty: 20 | Refills: 0
Start: 2023-09-02 | End: 2023-09-06

## 2023-09-02 RX ADMIN — Medication 3 MILLILITER(S): at 11:19

## 2023-09-02 RX ADMIN — Medication 125 MILLIGRAM(S): at 11:47

## 2023-09-02 RX ADMIN — PIPERACILLIN AND TAZOBACTAM 200 GRAM(S): 4; .5 INJECTION, POWDER, LYOPHILIZED, FOR SOLUTION INTRAVENOUS at 13:37

## 2023-09-02 NOTE — ED PROVIDER NOTE - PROVIDER TOKENS
FREE:[LAST:[YOUR PMD],PHONE:[(   )    -],FAX:[(   )    -],FOLLOWUP:[1-3 Days]],PROVIDER:[TOKEN:[[id number]],FOLLOWUP:[1-3 Days]],PROVIDER:[TOKEN:[[id number]],FOLLOWUP:[1-3 Days]]

## 2023-09-02 NOTE — ED PROVIDER NOTE - RESPIRATORY NORMAL BREATH SOUNDS
RIGHT UPPER LOBE/RIGHT MIDDLE LOBE/RIGHT LOWER LOBE/LEFT UPPER LOBE/LEFT LOWER LOBE RIGHT UPPER LOBE/RIGHT MIDDLE LOBE/RIGHT LOWER LOBE/LEFT UPPER LOBE

## 2023-09-02 NOTE — ED PROVIDER NOTE - PROGRESS NOTE DETAILS
Reevaluated patient at bedside.  Patient feeling much improved.  Discussed the results of all diagnostic testing in ED and copies of all reports given. Informed about all abnormal ct scan findings, advised need for stat f/u with her heme/onc, Dr. Parker next week for outpt workup and imaging of nodules and to r/o neoplasm, also advised f/u with her pulm, Dr. Gallegos next week. will rx augmentin, hycodan for cough, albuterol inh. strict return precautions given. Pt in NAD, no respiratory distress in ED, no hypoxia, afebrile, VSS.  An opportunity to ask questions was given.  Discussed the importance of prompt, close medical follow-up.  Patient will return with any changes, concerns or persistent / worsening symptoms.  Understanding of all instructions verbalized. Spoke to Dr. Gallegos, pt pulmonologist, discussed case and results including all ct findings, agreed with disposition on augmentin and hycodan, f/u in office next week.  pt also has appt to see her heme/onc, Dr. Parker on 9/6

## 2023-09-02 NOTE — ED PROVIDER NOTE - PATIENT'S PREFERRED PRONOUN
Her/She
FAMILY HISTORY:  FH: hypertension, ~ mother    Mother  Still living? Unknown  Family history of sickle cell trait, Age at diagnosis: Age Unknown

## 2023-09-02 NOTE — ED PROVIDER NOTE - NSFOLLOWUPINSTRUCTIONS_ED_ALL_ED_FT
Follow-up with your pulmonologist and oncologist for reevaluation, ongoing care and treatment.  Rest, stay hydrated, take full course of antibiotics as prescribed.  Take cough medication as prescribed.  If having worsening of symptoms, fever, shortness of breath, chest pain or other related symptoms, return to the ER immediately.    Community-Acquired Pneumonia, Adult  Pneumonia is an infection of the lungs. It causes irritation and swelling in the airways of the lungs. Mucus and fluid may also build up inside the airways. This may cause coughing and trouble breathing.    One type of pneumonia can happen while you are in a hospital. A different type can happen when you are not in a hospital (community-acquired pneumonia).    What are the causes?  The human body, showing how a virus travels from the air to a person's lungs.   This condition is caused by germs (viruses, bacteria, or fungi). Some types of germs can spread from person to person. Pneumonia is not thought to spread from person to person.    What increases the risk?  You have a long-term (chronic) disease, such as:  Disease of the lungs. This may be chronic obstructive pulmonary disease (COPD) or asthma.  Heart failure.  Cystic fibrosis.  Diabetes.  Kidney disease.  Sickle cell disease.  HIV.  You have other health problems, such as:  Your body's defense system (immune system) is weak.  A condition that may cause you to breathe in fluids from your mouth and nose.  You had your spleen taken out.  You do not take good care of your teeth and mouth (poor dental hygiene).  You use or have used tobacco products.  You go where the germs that cause this illness are common.  You are older than 65 years of age.  What are the signs or symptoms?  A cough.  A fever.  Sweating or chills.  Chest pain, often when you breathe deeply or cough.  Breathing problems, such as:  Fast breathing.  Trouble breathing.  Shortness of breath.  Feeling tired (fatigued).  Muscle aches.  How is this treated?  Treatment for this condition depends on many things, such as:  The cause of your illness.  Your medicines.  Your other health problems.  Most adults can be treated at home. Sometimes, treatment must happen in a hospital.  Treatment may include medicines to kill germs.  Medicines may depend on which germ caused your illness.  Very bad pneumonia is rare. If you get it, you may:  Have a machine to help you breathe.  Have fluid taken away from around your lungs.  Follow these instructions at home:  Medicines    Take over-the-counter and prescription medicines only as told by your doctor.  Take cough medicine only if you are losing sleep. Cough medicine can keep your body from taking mucus away from your lungs.  If you were prescribed antibiotics, take them as told by your doctor. Do not stop taking them even if you start to feel better.  Lifestyle    A sign showing that a person should not drink alcohol.  A sign showing that a person should not smoke.  Do not smoke or use any products that contain nicotine or tobacco. If you need help quitting, ask your doctor.  Do not drink alcohol.  Eat a healthy diet. This includes a lot of vegetables, fruits, whole grains, low-fat dairy products, and low-fat (lean) protein.  General instructions    A comparison of three sample cups showing dark yellow, yellow, and pale yellow urine.  Rest a lot. Sleep for at least 8 hours each night.  Sleep with your head and neck raised. Put a few pillows under your head or sleep in a reclining chair.  Return to your normal activities as told by your doctor. Ask your doctor what activities are safe for you.  Drink enough fluid to keep your pee (urine) pale yellow.  If your throat is sore, gargle with a mixture of salt and water 3–4 times a day or as needed. To make salt water, completely dissolve ½–1 tsp (3–6 g) of salt in 1 cup (237 mL) of warm water.  Keep all follow-up visits.  How is this prevented?  Getting the pneumonia shot (vaccine). These shots have different types and schedules. Ask your doctor what works best for you. Think about getting this shot if:  You are older than 65 years of age.  You are 19–65 years of age and:  You are being treated for cancer.  You have long-term lung disease.  You have other problems that affect your body's defense system. Ask your doctor if you have one of these.  Getting your flu shot every year. Ask your doctor which type of shot is best for you.  Going to the dentist as often as told.  Washing your hands often with soap and water for at least 20 seconds. If you cannot use soap and water, use hand .  Contact a doctor if:  You have a fever.  You lose sleep because your cough medicine does not help.  Get help right away if:  You are short of breath and this gets worse.  You have more chest pain.  Your sickness gets worse. This is very serious if:  You are an older adult.  Your body's defense system is weak.  You cough up blood.  These symptoms may be an emergency. Get help right away. Call 911.  Do not wait to see if the symptoms will go away.  Do not drive yourself to the hospital.  Summary  Pneumonia is an infection of the lungs.  Community-acquired pneumonia affects people who have not been in the hospital. Certain germs can cause this infection.  This condition may be treated with medicines that kill germs.  For very bad pneumonia, you may need a hospital stay and treatment to help with breathing.  This information is not intended to replace advice given to you by your health care provider. Make sure you discuss any questions you have with your health care provider.

## 2023-09-02 NOTE — ED ADULT TRIAGE NOTE - PATIENT'S PREFERRED PRONOUN
Called MOB. Sweaty and flushed, has low grade fever of 99.7. Tearful, crying, and appears  uncomfortable. Parents are keeping her comfortable by alternating tylenol and ibuprofen. Lots of wet diapers, MOB is keeping up the fluids and drinks extra fluids. Stools are little bit looser but not diarrhea. Appetitive is decreased but still eating. Per MOB, when patient is not being treated with the tylenol and ibuprofen, patient turns blue/purple (cyanotic) in extremities and around lips. Writer advises at this time for patient to go to the ER for further evaluation. MOB agrees with plan of care and is going to go to ER once FOB returns home to stay with 7 month sibling. Sofya Cuellar RN on 2/4/2020 at 5:16 PM     Her/She

## 2023-09-02 NOTE — ED PROVIDER NOTE - CARE PROVIDER_API CALL
YOUR PMD,   Phone: (   )    -  Fax: (   )    -  Follow Up Time: 1-3 Days    Renny Gallegos  Pulmonary Disease  [address] [place] [address]  Phone: [phone]  Fax: [phone]  Follow Up Time: 1-3 Days    Hari Parker  Medical Oncology  [address] [facility], [address] [place] [id number]  Phone: [phone]  Fax: [phone]  Follow Up Time: 1-3 Days

## 2023-09-02 NOTE — ED ADULT NURSE NOTE - NSFALLRISKINTERV_ED_ALL_ED

## 2023-09-02 NOTE — ED PROVIDER NOTE - PATIENT PORTAL LINK FT
You can access the FollowMyHealth Patient Portal offered by [facility] by registering at the following website: [email or web address]. By joining [employer]’s FollowMyHealth portal, you will also be able to view your health information using other applications (apps) compatible with our system.

## 2023-09-02 NOTE — ED ADULT NURSE NOTE - OBJECTIVE STATEMENT
Patient is an 82-year-old white female presenting to the emergency department at [facility] today complaining of wet cough nonproductive for the past few days with subjective fever without chills.  No chest pain no abdominal pain no back pain no vomiting no diarrhea.  Patient denies fever chills or shortness of breath at this time

## 2023-09-02 NOTE — ED PROVIDER NOTE - CLINICAL SUMMARY MEDICAL DECISION MAKING FREE TEXT BOX
Patient is an 82-year-old white female presenting to the emergency department at [facility] today complaining of wet cough nonproductive for the past few days with subjective fever without chills.  No chest pain no abdominal pain no back pain no vomiting no diarrhea.  This case will require complex evaluation labs imaging medications.

## 2023-09-02 NOTE — ED PROVIDER NOTE - OBJECTIVE STATEMENT
82-year-old female with history of COPD, PMR, PAF, aplastic anemia presents with complaint of wet cough and congestion x10 days.  Patient states that she has cough productive of green sputum for the past 10 days, was seen by her pulmonologist, Dr. Gallegos and given Z-Jeremiah for possible bronchitis without improvement.  States that cough is worse today and has chest congestion with "gurgling" sounds.  Patient uses trilogy and montelukast.  States that she has chronic swelling of her ankles which is improved at this time.  States that she has history of pneumonia and is concerned if she has pneumonia at this time.  Denies fever, chest pain, shortness of breath, calf pain/swelling, recent travel, known sick contacts, vomiting, abdominal pain or other symptoms.  PCP: Dr. Rodas

## 2023-09-02 NOTE — ED PROVIDER NOTE - MUSCULOSKELETAL, MLM
Spine appears normal, range of motion is not limited, no muscle or joint tenderness, very mild swelling around ankles noted, calf B NT, NVI

## 2023-09-02 NOTE — ED PROVIDER NOTE - ATTENDING APP SHARED VISIT CONTRIBUTION OF CARE
Examination reveals a well-developed well-nourished elderly white female in no acute distress with fine dry crackles at the left base normal heart sounds without murmur benign nontender abdomen and no clubbing cyanosis or edema.  I agree with plan and management outlined by PA.

## 2023-09-07 ENCOUNTER — APPOINTMENT (OUTPATIENT)
Dept: CARDIOLOGY | Facility: CLINIC | Age: 83
End: 2023-09-07
Payer: MEDICARE

## 2023-09-07 ENCOUNTER — OUTPATIENT (OUTPATIENT)
Dept: OUTPATIENT SERVICES | Facility: HOSPITAL | Age: 83
LOS: 1 days | Discharge: HOME | End: 2023-09-07

## 2023-09-07 VITALS
HEIGHT: 65 IN | SYSTOLIC BLOOD PRESSURE: 122 MMHG | TEMPERATURE: 99 F | RESPIRATION RATE: 18 BRPM | DIASTOLIC BLOOD PRESSURE: 63 MMHG | OXYGEN SATURATION: 97 % | HEART RATE: 76 BPM | WEIGHT: 175.05 LBS

## 2023-09-07 VITALS
OXYGEN SATURATION: 97 % | DIASTOLIC BLOOD PRESSURE: 78 MMHG | HEIGHT: 65 IN | SYSTOLIC BLOOD PRESSURE: 127 MMHG | HEART RATE: 77 BPM

## 2023-09-07 DIAGNOSIS — Z95.828 PRESENCE OF OTHER VASCULAR IMPLANTS AND GRAFTS: Chronic | ICD-10-CM

## 2023-09-07 DIAGNOSIS — S32.020G WEDGE COMPRESSION FRACTURE OF SECOND LUMBAR VERTEBRA, SUBSEQUENT ENCOUNTER FOR FRACTURE WITH DELAYED HEALING: ICD-10-CM

## 2023-09-07 DIAGNOSIS — I10 ESSENTIAL (PRIMARY) HYPERTENSION: ICD-10-CM

## 2023-09-07 DIAGNOSIS — J44.9 CHRONIC OBSTRUCTIVE PULMONARY DISEASE, UNSPECIFIED: ICD-10-CM

## 2023-09-07 DIAGNOSIS — S72.001A FRACTURE OF UNSPECIFIED PART OF NECK OF RIGHT FEMUR, INITIAL ENCOUNTER FOR CLOSED FRACTURE: Chronic | ICD-10-CM

## 2023-09-07 DIAGNOSIS — Z01.818 ENCOUNTER FOR OTHER PREPROCEDURAL EXAMINATION: ICD-10-CM

## 2023-09-07 DIAGNOSIS — Z98.89 OTHER SPECIFIED POSTPROCEDURAL STATES: Chronic | ICD-10-CM

## 2023-09-07 DIAGNOSIS — E11.9 TYPE 2 DIABETES MELLITUS WITHOUT COMPLICATIONS: ICD-10-CM

## 2023-09-07 DIAGNOSIS — Z90.710 ACQUIRED ABSENCE OF BOTH CERVIX AND UTERUS: Chronic | ICD-10-CM

## 2023-09-07 DIAGNOSIS — Z86.2 PERSONAL HISTORY OF DISEASES OF THE BLOOD AND BLOOD-FORMING ORGANS AND CERTAIN DISORDERS INVOLVING THE IMMUNE MECHANISM: ICD-10-CM

## 2023-09-07 DIAGNOSIS — Z98.890 OTHER SPECIFIED POSTPROCEDURAL STATES: Chronic | ICD-10-CM

## 2023-09-07 LAB
ANION GAP SERPL CALC-SCNC: 5 MMOL/L — SIGNIFICANT CHANGE UP (ref 5–17)
APTT BLD: 34.7 SEC — SIGNIFICANT CHANGE UP (ref 24.5–35.6)
BUN SERPL-MCNC: 31 MG/DL — HIGH (ref 7–23)
CALCIUM SERPL-MCNC: 9.6 MG/DL — SIGNIFICANT CHANGE UP (ref 8.5–10.1)
CHLORIDE SERPL-SCNC: 103 MMOL/L — SIGNIFICANT CHANGE UP (ref 96–108)
CO2 SERPL-SCNC: 28 MMOL/L — SIGNIFICANT CHANGE UP (ref 22–31)
CREAT SERPL-MCNC: 1.18 MG/DL — SIGNIFICANT CHANGE UP (ref 0.5–1.3)
CULTURE RESULTS: SIGNIFICANT CHANGE UP
CULTURE RESULTS: SIGNIFICANT CHANGE UP
EGFR: 47 ML/MIN/1.73M2 — LOW
GLUCOSE SERPL-MCNC: 105 MG/DL — HIGH (ref 70–99)
HCT VFR BLD CALC: 28.3 % — LOW (ref 34.5–45)
HGB BLD-MCNC: 9.1 G/DL — LOW (ref 11.5–15.5)
INR BLD: 3.49 RATIO — HIGH (ref 0.85–1.18)
MCHC RBC-ENTMCNC: 32.2 G/DL — SIGNIFICANT CHANGE UP (ref 32–36)
MCHC RBC-ENTMCNC: 34.7 PG — HIGH (ref 27–34)
MCV RBC AUTO: 108 FL — HIGH (ref 80–100)
NRBC # BLD: 2 /100 WBCS — HIGH (ref 0–0)
PLATELET # BLD AUTO: 259 K/UL — SIGNIFICANT CHANGE UP (ref 150–400)
POTASSIUM SERPL-MCNC: 4.6 MMOL/L — SIGNIFICANT CHANGE UP (ref 3.5–5.3)
POTASSIUM SERPL-SCNC: 4.6 MMOL/L — SIGNIFICANT CHANGE UP (ref 3.5–5.3)
PROTHROM AB SERPL-ACNC: 40.1 SEC — HIGH (ref 9.5–13)
RBC # BLD: 2.62 M/UL — LOW (ref 3.8–5.2)
RBC # FLD: 23.4 % — HIGH (ref 10.3–14.5)
SODIUM SERPL-SCNC: 136 MMOL/L — SIGNIFICANT CHANGE UP (ref 135–145)
SPECIMEN SOURCE: SIGNIFICANT CHANGE UP
SPECIMEN SOURCE: SIGNIFICANT CHANGE UP
WBC # BLD: 5.44 K/UL — SIGNIFICANT CHANGE UP (ref 3.8–10.5)
WBC # FLD AUTO: 5.44 K/UL — SIGNIFICANT CHANGE UP (ref 3.8–10.5)

## 2023-09-07 PROCEDURE — 93000 ELECTROCARDIOGRAM COMPLETE: CPT

## 2023-09-07 PROCEDURE — 85610 PROTHROMBIN TIME: CPT | Mod: QW

## 2023-09-07 PROCEDURE — 99215 OFFICE O/P EST HI 40 MIN: CPT

## 2023-09-07 RX ORDER — MIDODRINE HYDROCHLORIDE 2.5 MG/1
2 TABLET ORAL
Refills: 0 | DISCHARGE

## 2023-09-07 RX ORDER — DENOSUMAB 60 MG/ML
60 INJECTION SUBCUTANEOUS
Refills: 0 | DISCHARGE

## 2023-09-07 RX ORDER — DILTIAZEM HCL 120 MG
25 CAPSULE, EXT RELEASE 24 HR ORAL
Qty: 0 | Refills: 0 | DISCHARGE

## 2023-09-07 NOTE — H&P PST ADULT - NSICDXPASTMEDICALHX_GEN_ALL_CORE_FT
PAST MEDICAL HISTORY:  COPD (chronic obstructive pulmonary disease)     DM (diabetes mellitus)     GIB (gastrointestinal bleeding)     H/O aplastic anemia     H/O osteoporosis     Hiatal hernia     History of basal cell cancer     History of IBS     HLD (hyperlipidemia)     PAF (paroxysmal atrial fibrillation) s/p ablation    PMR (polymyalgia rheumatica)

## 2023-09-07 NOTE — H&P PST ADULT - PROBLEM SELECTOR PROBLEM 1
Wedge compression fracture of second lumbar vertebra, subsequent encounter for fracture with delayed healing

## 2023-09-07 NOTE — PHYSICAL EXAM
[Well Nourished] : well nourished [No Respiratory Distress] : no respiratory distress  [Wheeze ____] : wheeze [unfilled] [Normal] : alert and oriented, normal memory [de-identified] : in wheelchair

## 2023-09-07 NOTE — HISTORY OF PRESENT ILLNESS
[FreeTextEntry1] : 82 year old female with PMhx of AFib (on Warfarin), CKD, COPD, Anemia, DM, HTN, Hiatal Hernia, IBS, PAD, TMJ, Sinus Ermias, PMR on Prednisone who is here for Cardiac Follow up.  Since her last visit, she ha da  mechanical fall a few weeks ago after tripping and falling which resulted in a compression fracture. she is in severe pain and is requiring a kyphoplasty. She also recently went to the ED at  for productive cough. She had a CT chest with new left upper lobe nodule and left posterior basal consolidation. She was given more steroids and abx. I personally reviewed all of the hospital records available to me at this time, which included but are not limited to the discharge summary, labs and imaging reports.   She is having diarrhea with Augmentin. Her lightheadedness has improved with midodrine.  She denies any LOC. Her episodes are now down to 2-3 times a day and happening on change of position.  She   denies any chest pain, PND, orthopnea, lower extremity edema,  strokelike symptoms.  Medication reconciliation performed. She is compliant with her medications.

## 2023-09-07 NOTE — DISCUSSION/SUMMARY
[FreeTextEntry1] : 82 year old woman with a history as listed above presents for a followup visit.  Dee unfortunately had  a mechanical fall and now need s kyphoplasty next Thursday at [place]. She denies any anginal symptoms. Clinically she is not in decompensated heart failure.  Her physical exam was essentially unrevealing for any significant cardiovascular findings. Her EKG is unchanged from previous.    She currently has no active cardiac conditions. She may proceed with the planned low risk procedure without any further cardiac workup. Routine hemodynamic monitoring is suggested during the procedure. She may hold her AC 5 days prior to the kyphoplasty.  Her orthostasis has improved on Midodrine 10mg q8. I will tolerate a small level of permissive HTN.    She had a Ziopatch in 2/2021 that showed symptomatic PACs and PAT episodes. There was no AF seen. She did not have any signs of heart block or symptomatic bradycardia during the monitoring period.  Cardizem will be stopped today. She will continue  Amiodarone 100mg Qday Her last PFTs in 2/2023 showed severe reduction in diffusion capacity. This may be from Amio. Will have a repeat PFT in 3 months. She continue to follow up with Dr Gallegos.   Her lipid profile is at goal.   She will have her INR checked in our [facility]. Her goal INR is 2-3 for CVA risk reduction and VTE prevention. She will followup with heme for her anemia and transfusions.   Exercise and diet counseling was performed in order to reduce her future cardiovascular risk.  I asked she return for follow up in 3 months.      [EKG obtained to assist in diagnosis and management of assessed problem(s)] : EKG obtained to assist in diagnosis and management of assessed problem(s)

## 2023-09-07 NOTE — REVIEW OF SYSTEMS
[Feeling Fatigued] : not feeling fatigued [Cough] : no cough [Joint Pain] : joint pain [Joint Stiffness] : joint stiffness [Dizziness] : dizziness [Negative] : Heme/Lymph [FreeTextEntry2] : as per HPI [de-identified] : as per HPI- unchanged

## 2023-09-07 NOTE — H&P PST ADULT - PROBLEM SELECTOR PLAN 1
Kyphoplaty  Pre-op instructions given by RN, patient verbalized understanding  Chlorhexidine wash instructions given   medical clearance   cardiac clearance  Anesthesiologist to review PST labs, EKG, required clearances and optimization for surgery.

## 2023-09-07 NOTE — CARDIOLOGY SUMMARY
[de-identified] : Sinus  Rhythm   IRBBB.   [de-identified] :  2/2021 that showed symptomatic PACs and PAT episodes. There was no AF seen.  [de-identified] : 4/2021 normal LV function w moderate LVH. moderate PHTN 55mmHG\par  3/2023 normal LV function LVH, mild PHTN

## 2023-09-07 NOTE — H&P PST ADULT - HISTORY OF PRESENT ILLNESS
82F PMH of COPD, AF (s/p ablation on coumadin)  DM, COPD, Pulmonary Fibrosis, vascular necrosis, aplastic anemia (s/p several transfusions) c/o badk pain 2/2 wedge compression fracture here for PST for scheduled kyphoplasty with Dr. Chambers on 9-  This patient denies any fever, cough, sob, flu like symptoms or travel outside of the [place] in the past 30 days

## 2023-09-08 LAB
INR PPP: 3.2 RATIO
QUALITY CONTROL: YES

## 2023-09-08 NOTE — PROGRESS NOTE ADULT - PROBLEM SELECTOR PROBLEM 1
Pneumonia
Pneumonia
Being so restless that it is hard to sit still Not at all Several days Several days   Becoming easily annoyed or irritable Not at all Not at all Not at all   Feeling afraid as if something awful might happen Not at all Not at all Not at all   JONO-7 Total Score 0 2 1     JONO 7 SCORE 9/8/2023 8/4/2023 7/21/2023   JONO-7 Total Score 0 2 1       Interpretation of Total Score. Anxiety Severity: Score 0-4: Minimal Anxiety. Score 5-9: Mild Anxiety. Score 10-14: Moderate Anxiety. Score greater than 15: Severe Anxiety. Diagnosis:    1. Reactive depression (situational)        Past Medical History      Diagnosis Date    Anemia 7/26/2022    Current mild episode of major depressive disorder without prior episode ([address]) 6/28/2023    Hypothyroidism 7/26/2022       Plan:  Pt interventions:  Trained in strategies for increasing balanced thinking, Discussed and set plan for behavioral activation, Discussed self-care (sleep, nutrition, rewarding activities, social support, exercise), Supportive techniques, and Emphasized self-care as important for managing overall health    Pt Behavioral Change Plan:   See Pt Instructions.
Pneumonia

## 2023-09-11 ENCOUNTER — APPOINTMENT (OUTPATIENT)
Dept: INTERNAL MEDICINE | Facility: CLINIC | Age: 83
End: 2023-09-11
Payer: MEDICARE

## 2023-09-11 ENCOUNTER — APPOINTMENT (OUTPATIENT)
Dept: CARDIOLOGY | Facility: CLINIC | Age: 83
End: 2023-09-11
Payer: MEDICARE

## 2023-09-11 VITALS
HEIGHT: 65 IN | WEIGHT: 175 LBS | TEMPERATURE: 98.6 F | BODY MASS INDEX: 29.16 KG/M2 | HEART RATE: 70 BPM | OXYGEN SATURATION: 97 % | DIASTOLIC BLOOD PRESSURE: 60 MMHG | SYSTOLIC BLOOD PRESSURE: 118 MMHG | RESPIRATION RATE: 16 BRPM

## 2023-09-11 DIAGNOSIS — M48.50XA COLLAPSED VERTEBRA, NOT ELSEWHERE CLASSIFIED, SITE UNSPECIFIED, INITIAL ENCOUNTER FOR FRACTURE: ICD-10-CM

## 2023-09-11 DIAGNOSIS — Z01.818 ENCOUNTER FOR OTHER PREPROCEDURAL EXAMINATION: ICD-10-CM

## 2023-09-11 PROBLEM — Z85.828 PERSONAL HISTORY OF OTHER MALIGNANT NEOPLASM OF SKIN: Chronic | Status: ACTIVE | Noted: 2023-09-07

## 2023-09-11 PROBLEM — Z86.2 PERSONAL HISTORY OF DISEASES OF THE BLOOD AND BLOOD-FORMING ORGANS AND CERTAIN DISORDERS INVOLVING THE IMMUNE MECHANISM: Chronic | Status: ACTIVE | Noted: 2023-09-07

## 2023-09-11 PROBLEM — E78.5 HYPERLIPIDEMIA, UNSPECIFIED: Chronic | Status: ACTIVE | Noted: 2023-09-07

## 2023-09-11 PROBLEM — Z87.19 PERSONAL HISTORY OF OTHER DISEASES OF THE DIGESTIVE SYSTEM: Chronic | Status: ACTIVE | Noted: 2023-09-07

## 2023-09-11 PROBLEM — Z87.39 PERSONAL HISTORY OF OTHER DISEASES OF THE MUSCULOSKELETAL SYSTEM AND CONNECTIVE TISSUE: Chronic | Status: ACTIVE | Noted: 2023-09-07

## 2023-09-11 PROBLEM — M35.3 POLYMYALGIA RHEUMATICA: Chronic | Status: ACTIVE | Noted: 2023-09-07

## 2023-09-11 LAB
INR PPP: 1.2 RATIO
QUALITY CONTROL: YES

## 2023-09-11 PROCEDURE — 93793 ANTICOAG MGMT PT WARFARIN: CPT

## 2023-09-11 PROCEDURE — 99214 OFFICE O/P EST MOD 30 MIN: CPT

## 2023-09-11 PROCEDURE — 85610 PROTHROMBIN TIME: CPT | Mod: QW

## 2023-09-11 RX ORDER — ENOXAPARIN SODIUM 80 MG/.8ML
80 INJECTION, SOLUTION SUBCUTANEOUS TWICE DAILY
Qty: 14 | Refills: 1 | Status: ACTIVE | COMMUNITY
Start: 2023-09-11

## 2023-09-14 ENCOUNTER — APPOINTMENT (OUTPATIENT)
Dept: ORTHOPEDIC SURGERY | Facility: HOSPITAL | Age: 83
End: 2023-09-14

## 2023-09-14 ENCOUNTER — INPATIENT (INPATIENT)
Facility: HOSPITAL | Age: 83
LOS: 5 days | Discharge: NURSING, REHAB OR LONG-TERM CARE | End: 2023-09-20
Attending: ORTHOPAEDIC SURGERY | Admitting: ORTHOPAEDIC SURGERY
Payer: MEDICARE

## 2023-09-14 VITALS
TEMPERATURE: 98 F | SYSTOLIC BLOOD PRESSURE: 100 MMHG | OXYGEN SATURATION: 98 % | WEIGHT: 175.05 LBS | RESPIRATION RATE: 18 BRPM | HEIGHT: 65 IN | DIASTOLIC BLOOD PRESSURE: 54 MMHG | HEART RATE: 82 BPM

## 2023-09-14 DIAGNOSIS — S72.001A FRACTURE OF UNSPECIFIED PART OF NECK OF RIGHT FEMUR, INITIAL ENCOUNTER FOR CLOSED FRACTURE: Chronic | ICD-10-CM

## 2023-09-14 DIAGNOSIS — D61.9 APLASTIC ANEMIA, UNSPECIFIED: ICD-10-CM

## 2023-09-14 DIAGNOSIS — E78.5 HYPERLIPIDEMIA, UNSPECIFIED: ICD-10-CM

## 2023-09-14 DIAGNOSIS — Z98.89 OTHER SPECIFIED POSTPROCEDURAL STATES: Chronic | ICD-10-CM

## 2023-09-14 DIAGNOSIS — Z90.710 ACQUIRED ABSENCE OF BOTH CERVIX AND UTERUS: Chronic | ICD-10-CM

## 2023-09-14 DIAGNOSIS — M54.50 LOW BACK PAIN, UNSPECIFIED: ICD-10-CM

## 2023-09-14 DIAGNOSIS — Z86.79 PERSONAL HISTORY OF OTHER DISEASES OF THE CIRCULATORY SYSTEM: ICD-10-CM

## 2023-09-14 DIAGNOSIS — D64.9 ANEMIA, UNSPECIFIED: ICD-10-CM

## 2023-09-14 DIAGNOSIS — H26.40 UNSPECIFIED SECONDARY CATARACT: Chronic | ICD-10-CM

## 2023-09-14 DIAGNOSIS — Z98.890 OTHER SPECIFIED POSTPROCEDURAL STATES: Chronic | ICD-10-CM

## 2023-09-14 DIAGNOSIS — Z95.828 PRESENCE OF OTHER VASCULAR IMPLANTS AND GRAFTS: Chronic | ICD-10-CM

## 2023-09-14 DIAGNOSIS — I48.0 PAROXYSMAL ATRIAL FIBRILLATION: ICD-10-CM

## 2023-09-14 DIAGNOSIS — M35.3 POLYMYALGIA RHEUMATICA: ICD-10-CM

## 2023-09-14 LAB
ALBUMIN SERPL ELPH-MCNC: 3.7 G/DL — SIGNIFICANT CHANGE UP (ref 3.3–5)
ALP SERPL-CCNC: 69 U/L — SIGNIFICANT CHANGE UP (ref 40–120)
ALT FLD-CCNC: 18 U/L — SIGNIFICANT CHANGE UP (ref 12–78)
ANION GAP SERPL CALC-SCNC: 6 MMOL/L — SIGNIFICANT CHANGE UP (ref 5–17)
ANISOCYTOSIS BLD QL: SIGNIFICANT CHANGE UP
APTT BLD: 34.6 SEC — SIGNIFICANT CHANGE UP (ref 24.5–35.6)
AST SERPL-CCNC: 9 U/L — LOW (ref 15–37)
BASO STIPL BLD QL SMEAR: PRESENT — SIGNIFICANT CHANGE UP
BASOPHILS # BLD AUTO: 0 K/UL — SIGNIFICANT CHANGE UP (ref 0–0.2)
BASOPHILS NFR BLD AUTO: 0 % — SIGNIFICANT CHANGE UP (ref 0–2)
BILIRUB SERPL-MCNC: 1 MG/DL — SIGNIFICANT CHANGE UP (ref 0.2–1.2)
BLD GP AB SCN SERPL QL: SIGNIFICANT CHANGE UP
BUN SERPL-MCNC: 30 MG/DL — HIGH (ref 7–23)
CALCIUM SERPL-MCNC: 9.1 MG/DL — SIGNIFICANT CHANGE UP (ref 8.5–10.1)
CHLORIDE SERPL-SCNC: 108 MMOL/L — SIGNIFICANT CHANGE UP (ref 96–108)
CO2 SERPL-SCNC: 23 MMOL/L — SIGNIFICANT CHANGE UP (ref 22–31)
CREAT SERPL-MCNC: 1.35 MG/DL — HIGH (ref 0.5–1.3)
DACRYOCYTES BLD QL SMEAR: SLIGHT — SIGNIFICANT CHANGE UP
DIR ANTIGLOB POLYSPECIFIC INTERPRETATION: SIGNIFICANT CHANGE UP
EGFR: 40 ML/MIN/1.73M2 — LOW
ELLIPTOCYTES BLD QL SMEAR: SLIGHT — SIGNIFICANT CHANGE UP
EOSINOPHIL # BLD AUTO: 0 K/UL — SIGNIFICANT CHANGE UP (ref 0–0.5)
EOSINOPHIL NFR BLD AUTO: 0 % — SIGNIFICANT CHANGE UP (ref 0–6)
FERRITIN SERPL-MCNC: 1342 NG/ML — HIGH (ref 13–330)
FOLATE SERPL-MCNC: 16.2 NG/ML — SIGNIFICANT CHANGE UP
GLUCOSE SERPL-MCNC: 118 MG/DL — HIGH (ref 70–99)
HAPTOGLOB SERPL-MCNC: 122 MG/DL — SIGNIFICANT CHANGE UP (ref 34–200)
HCT VFR BLD CALC: 21.9 % — LOW (ref 34.5–45)
HGB BLD-MCNC: 6.9 G/DL — CRITICAL LOW (ref 11.5–15.5)
HYPOCHROMIA BLD QL: SLIGHT — SIGNIFICANT CHANGE UP
INR BLD: 1.21 RATIO — HIGH (ref 0.85–1.18)
IRON SATN MFR SERPL: 242 UG/DL — HIGH (ref 30–160)
IRON SATN MFR SERPL: SIGNIFICANT CHANGE UP % (ref 14–50)
LDH SERPL L TO P-CCNC: 199 U/L — SIGNIFICANT CHANGE UP (ref 50–242)
LYMPHOCYTES # BLD AUTO: 0.7 K/UL — LOW (ref 1–3.3)
LYMPHOCYTES # BLD AUTO: 10 % — LOW (ref 13–44)
MANUAL SMEAR VERIFICATION: SIGNIFICANT CHANGE UP
MCHC RBC-ENTMCNC: 31.5 G/DL — LOW (ref 32–36)
MCHC RBC-ENTMCNC: 33.5 PG — SIGNIFICANT CHANGE UP (ref 27–34)
MCV RBC AUTO: 106.3 FL — HIGH (ref 80–100)
MICROCYTES BLD QL: SIGNIFICANT CHANGE UP
MONOCYTES # BLD AUTO: 0.98 K/UL — HIGH (ref 0–0.9)
MONOCYTES NFR BLD AUTO: 14 % — SIGNIFICANT CHANGE UP (ref 2–14)
NEUTROPHILS # BLD AUTO: 5.31 K/UL — SIGNIFICANT CHANGE UP (ref 1.8–7.4)
NEUTROPHILS NFR BLD AUTO: 72 % — SIGNIFICANT CHANGE UP (ref 43–77)
NEUTS BAND # BLD: 4 % — SIGNIFICANT CHANGE UP (ref 0–8)
NRBC # BLD: 3 /100 — HIGH (ref 0–0)
NRBC # BLD: SIGNIFICANT CHANGE UP /100 WBCS (ref 0–0)
OVALOCYTES BLD QL SMEAR: SLIGHT — SIGNIFICANT CHANGE UP
PLAT MORPH BLD: NORMAL — SIGNIFICANT CHANGE UP
PLATELET # BLD AUTO: 289 K/UL — SIGNIFICANT CHANGE UP (ref 150–400)
POIKILOCYTOSIS BLD QL AUTO: SLIGHT — SIGNIFICANT CHANGE UP
POLYCHROMASIA BLD QL SMEAR: SLIGHT — SIGNIFICANT CHANGE UP
POTASSIUM SERPL-MCNC: 4.3 MMOL/L — SIGNIFICANT CHANGE UP (ref 3.5–5.3)
POTASSIUM SERPL-SCNC: 4.3 MMOL/L — SIGNIFICANT CHANGE UP (ref 3.5–5.3)
PROT SERPL-MCNC: 6.1 G/DL — SIGNIFICANT CHANGE UP (ref 6–8.3)
PROT SERPL-MCNC: 6.1 G/DL — SIGNIFICANT CHANGE UP (ref 6–8.3)
PROT SERPL-MCNC: 6.6 GM/DL — SIGNIFICANT CHANGE UP (ref 6–8.3)
PROTHROM AB SERPL-ACNC: 14.3 SEC — HIGH (ref 9.5–13)
RBC # BLD: 1.93 M/UL — LOW (ref 3.8–5.2)
RBC # BLD: 2.06 M/UL — LOW (ref 3.8–5.2)
RBC # FLD: 23.3 % — HIGH (ref 10.3–14.5)
RBC BLD AUTO: ABNORMAL
RETICS #: 39 K/UL — SIGNIFICANT CHANGE UP (ref 25–125)
RETICS/RBC NFR: 2 % — SIGNIFICANT CHANGE UP (ref 0.5–2.5)
SMUDGE CELLS # BLD: PRESENT — SIGNIFICANT CHANGE UP
SODIUM SERPL-SCNC: 137 MMOL/L — SIGNIFICANT CHANGE UP (ref 135–145)
STOMATOCYTES BLD QL SMEAR: SLIGHT — SIGNIFICANT CHANGE UP
TIBC SERPL-MCNC: SIGNIFICANT CHANGE UP UG/DL (ref 220–430)
TSH SERPL-MCNC: 1.37 UIU/ML — SIGNIFICANT CHANGE UP (ref 0.36–3.74)
UIBC SERPL-MCNC: <20 UG/DL — LOW (ref 110–370)
VIT B12 SERPL-MCNC: 663 PG/ML — SIGNIFICANT CHANGE UP (ref 232–1245)
WBC # BLD: 6.99 K/UL — SIGNIFICANT CHANGE UP (ref 3.8–10.5)
WBC # FLD AUTO: 6.99 K/UL — SIGNIFICANT CHANGE UP (ref 3.8–10.5)

## 2023-09-14 PROCEDURE — 74176 CT ABD & PELVIS W/O CONTRAST: CPT | Mod: 26

## 2023-09-14 PROCEDURE — 73502 X-RAY EXAM HIP UNI 2-3 VIEWS: CPT | Mod: 26,RT

## 2023-09-14 PROCEDURE — 88187 FLOWCYTOMETRY/READ 2-8: CPT

## 2023-09-14 PROCEDURE — 93971 EXTREMITY STUDY: CPT | Mod: 26,RT

## 2023-09-14 PROCEDURE — 73700 CT LOWER EXTREMITY W/O DYE: CPT | Mod: 26,RT

## 2023-09-14 PROCEDURE — 71045 X-RAY EXAM CHEST 1 VIEW: CPT | Mod: 26

## 2023-09-14 PROCEDURE — 99223 1ST HOSP IP/OBS HIGH 75: CPT

## 2023-09-14 PROCEDURE — 93010 ELECTROCARDIOGRAM REPORT: CPT

## 2023-09-14 PROCEDURE — 99285 EMERGENCY DEPT VISIT HI MDM: CPT | Mod: FS

## 2023-09-14 RX ORDER — SIMVASTATIN 20 MG/1
10 TABLET, FILM COATED ORAL AT BEDTIME
Refills: 0 | Status: DISCONTINUED | OUTPATIENT
Start: 2023-09-14 | End: 2023-09-20

## 2023-09-14 RX ORDER — ACETAMINOPHEN 500 MG
650 TABLET ORAL EVERY 6 HOURS
Refills: 0 | Status: DISCONTINUED | OUTPATIENT
Start: 2023-09-14 | End: 2023-09-20

## 2023-09-14 RX ORDER — GABAPENTIN 400 MG/1
400 CAPSULE ORAL
Refills: 0 | Status: DISCONTINUED | OUTPATIENT
Start: 2023-09-14 | End: 2023-09-20

## 2023-09-14 RX ORDER — AMIODARONE HYDROCHLORIDE 400 MG/1
100 TABLET ORAL DAILY
Refills: 0 | Status: DISCONTINUED | OUTPATIENT
Start: 2023-09-14 | End: 2023-09-20

## 2023-09-14 RX ORDER — FLUTICASONE FUROATE, UMECLIDINIUM BROMIDE AND VILANTEROL TRIFENATATE 200; 62.5; 25 UG/1; UG/1; UG/1
0 POWDER RESPIRATORY (INHALATION)
Qty: 0 | Refills: 0 | DISCHARGE

## 2023-09-14 RX ORDER — OXYCODONE HYDROCHLORIDE 5 MG/1
5 TABLET ORAL EVERY 6 HOURS
Refills: 0 | Status: DISCONTINUED | OUTPATIENT
Start: 2023-09-14 | End: 2023-09-20

## 2023-09-14 RX ORDER — MIDODRINE HYDROCHLORIDE 2.5 MG/1
10 TABLET ORAL THREE TIMES A DAY
Refills: 0 | Status: DISCONTINUED | OUTPATIENT
Start: 2023-09-14 | End: 2023-09-20

## 2023-09-14 RX ORDER — TRAMADOL HYDROCHLORIDE 50 MG/1
1 TABLET ORAL
Qty: 0 | Refills: 0 | DISCHARGE

## 2023-09-14 RX ORDER — FOLIC ACID 0.8 MG
1 TABLET ORAL DAILY
Refills: 0 | Status: DISCONTINUED | OUTPATIENT
Start: 2023-09-14 | End: 2023-09-20

## 2023-09-14 RX ORDER — INFLUENZA VIRUS VACCINE 15; 15; 15; 15 UG/.5ML; UG/.5ML; UG/.5ML; UG/.5ML
0.7 SUSPENSION INTRAMUSCULAR ONCE
Refills: 0 | Status: DISCONTINUED | OUTPATIENT
Start: 2023-09-14 | End: 2023-09-20

## 2023-09-14 RX ORDER — PANTOPRAZOLE SODIUM 20 MG/1
40 TABLET, DELAYED RELEASE ORAL
Refills: 0 | Status: DISCONTINUED | OUTPATIENT
Start: 2023-09-14 | End: 2023-09-20

## 2023-09-14 RX ORDER — MONTELUKAST 4 MG/1
10 TABLET, CHEWABLE ORAL AT BEDTIME
Refills: 0 | Status: DISCONTINUED | OUTPATIENT
Start: 2023-09-14 | End: 2023-09-20

## 2023-09-14 RX ORDER — DIPHENHYDRAMINE HCL 50 MG
25 CAPSULE ORAL ONCE
Refills: 0 | Status: COMPLETED | OUTPATIENT
Start: 2023-09-14 | End: 2023-09-14

## 2023-09-14 RX ORDER — SENNA PLUS 8.6 MG/1
2 TABLET ORAL AT BEDTIME
Refills: 0 | Status: DISCONTINUED | OUTPATIENT
Start: 2023-09-14 | End: 2023-09-20

## 2023-09-14 RX ORDER — AMIODARONE HYDROCHLORIDE 400 MG/1
1 TABLET ORAL
Qty: 0 | Refills: 0 | DISCHARGE

## 2023-09-14 RX ORDER — MORPHINE SULFATE 50 MG/1
2 CAPSULE, EXTENDED RELEASE ORAL EVERY 6 HOURS
Refills: 0 | Status: DISCONTINUED | OUTPATIENT
Start: 2023-09-14 | End: 2023-09-16

## 2023-09-14 RX ORDER — TIOTROPIUM BROMIDE 18 UG/1
2 CAPSULE ORAL; RESPIRATORY (INHALATION) DAILY
Refills: 0 | Status: DISCONTINUED | OUTPATIENT
Start: 2023-09-14 | End: 2023-09-20

## 2023-09-14 RX ORDER — BUDESONIDE AND FORMOTEROL FUMARATE DIHYDRATE 160; 4.5 UG/1; UG/1
2 AEROSOL RESPIRATORY (INHALATION)
Refills: 0 | Status: DISCONTINUED | OUTPATIENT
Start: 2023-09-14 | End: 2023-09-20

## 2023-09-14 RX ADMIN — SIMVASTATIN 10 MILLIGRAM(S): 20 TABLET, FILM COATED ORAL at 22:25

## 2023-09-14 RX ADMIN — MIDODRINE HYDROCHLORIDE 10 MILLIGRAM(S): 2.5 TABLET ORAL at 17:55

## 2023-09-14 RX ADMIN — Medication 25 MILLIGRAM(S): at 15:27

## 2023-09-14 RX ADMIN — OXYCODONE HYDROCHLORIDE 5 MILLIGRAM(S): 5 TABLET ORAL at 17:35

## 2023-09-14 RX ADMIN — MORPHINE SULFATE 2 MILLIGRAM(S): 50 CAPSULE, EXTENDED RELEASE ORAL at 21:30

## 2023-09-14 RX ADMIN — SENNA PLUS 2 TABLET(S): 8.6 TABLET ORAL at 22:25

## 2023-09-14 RX ADMIN — MORPHINE SULFATE 2 MILLIGRAM(S): 50 CAPSULE, EXTENDED RELEASE ORAL at 21:14

## 2023-09-14 RX ADMIN — OXYCODONE HYDROCHLORIDE 5 MILLIGRAM(S): 5 TABLET ORAL at 18:54

## 2023-09-14 RX ADMIN — GABAPENTIN 400 MILLIGRAM(S): 400 CAPSULE ORAL at 17:55

## 2023-09-14 RX ADMIN — Medication 650 MILLIGRAM(S): at 15:27

## 2023-09-14 RX ADMIN — MONTELUKAST 10 MILLIGRAM(S): 4 TABLET, CHEWABLE ORAL at 22:25

## 2023-09-14 RX ADMIN — Medication 650 MILLIGRAM(S): at 16:19

## 2023-09-14 NOTE — H&P ADULT - HISTORY OF PRESENT ILLNESS
**************INCOMPLETE****************    Patient is a 82F who presents to [facility] ED from Dr. Chambers after her initial planed kyphoplasty for today at [facility] was cancelled secondary to symptomatic anemia. PMH is significant for Afib (Coumadin and Amio), HLD, COPD, IBS, Aplastic Anemia, osteoporosis, neuropathy, PMR (Prednisone), IVIG for immunodeficiency,    Patient 's recent hemoglobin per her oncologist was 7.9 and was instructed to come to the ED for blood transfusion. Patient states her baseline Hgb is around 9. Patient describes right leg pain, mild weakness and dizziness this morning. Denies any recent falls or trauma. Patient's also complains of right leg pain which began two days ago secondary to no acute trauma or fall. Patient endorses having difficulty walking for the past two days. Patient states that she was on Coumadin until 1 week ago when she was instructed to start Lovenox 80mg BID per her hematologist. Her last dose was last night.     Vital Signs (24 Hrs):  T(C): 37 (09-14-23 @ 15:30), Max: 37 (09-14-23 @ 15:30)  HR: 79 (09-14-23 @ 15:30) (79 - 90)  BP: 92/53 (09-14-23 @ 15:30) (92/53 - 105/67)  RR: 18 (09-14-23 @ 15:30) (18 - 18)  SpO2: 98% (09-14-23 @ 15:30) (95% - 98%)  Wt(kg): --    LABS:                          6.9    6.99  )-----------( 289      ( 14 Sep 2023 11:30 )             21.9     09-14    137  |  108  |  30<H>  ----------------------------<  118<H>  4.3   |  23  |  1.35<H>    Ca    9.1      14 Sep 2023 11:30    TPro  6.6  /  Alb  3.7  /  TBili  1.0  /  DBili  x   /  AST  9<L>  /  ALT  18  /  AlkPhos  69  09-14    LIVER FUNCTIONS - ( 14 Sep 2023 11:30 )  Alb: 3.7 g/dL / Pro: 6.6 gm/dL / ALK PHOS: 69 U/L / ALT: 18 U/L / AST: 9 U/L / GGT: x           PT/INR - ( 14 Sep 2023 11:30 )   PT: 14.3 sec;   INR: 1.21 ratio         PTT - ( 14 Sep 2023 11:30 )  PTT:34.6 sec    Physical Exam:    RLE:   Skin intact, No erythema  Mild swelling to right upper thigh  Mild TTP to right hip/groin area  NVI    Spine:      A/P: Patient is a 82F with a PMH of aplastic anemia,  **************INCOMPLETE****************    Patient is a 82F who presents to [facility] ED from Dr. Chambers after her initial planed kyphoplasty for today at [facility] was cancelled secondary to symptomatic anemia. PMH is significant for Afib (Coumadin and Amio), HLD, COPD, IBS, Aplastic Anemia, osteoporosis, neuropathy, PMR (Prednisone), s/p ORIF of right hip in 2016.    Patient 's recent hemoglobin per her oncologist was 7.9 and was instructed to come to the ED for blood transfusion. Patient states her baseline Hgb is around 9. Patient describes right leg pain, mild weakness and dizziness this morning. Denies any recent falls or trauma. Patient's also complains of right leg pain which began two days ago secondary to no acute trauma or fall. Patient endorses having difficulty walking for the past two days. Patient states that she was on Coumadin until 1 week ago when she was instructed to start Lovenox 80mg BID per her hematologist. Her last dose was last night.     Vital Signs (24 Hrs):  T(C): 37 (09-14-23 @ 15:30), Max: 37 (09-14-23 @ 15:30)  HR: 79 (09-14-23 @ 15:30) (79 - 90)  BP: 92/53 (09-14-23 @ 15:30) (92/53 - 105/67)  RR: 18 (09-14-23 @ 15:30) (18 - 18)  SpO2: 98% (09-14-23 @ 15:30) (95% - 98%)  Wt(kg): --    LABS:                          6.9    6.99  )-----------( 289      ( 14 Sep 2023 11:30 )             21.9     09-14    137  |  108  |  30<H>  ----------------------------<  118<H>  4.3   |  23  |  1.35<H>    Ca    9.1      14 Sep 2023 11:30    TPro  6.6  /  Alb  3.7  /  TBili  1.0  /  DBili  x   /  AST  9<L>  /  ALT  18  /  AlkPhos  69  09-14    LIVER FUNCTIONS - ( 14 Sep 2023 11:30 )  Alb: 3.7 g/dL / Pro: 6.6 gm/dL / ALK PHOS: 69 U/L / ALT: 18 U/L / AST: 9 U/L / GGT: x           PT/INR - ( 14 Sep 2023 11:30 )   PT: 14.3 sec;   INR: 1.21 ratio         PTT - ( 14 Sep 2023 11:30 )  PTT:34.6 sec    Physical Exam:  General: NAD, lying comfortably in bed    Spine:  PHYSICAL EXAM:  General; Awake and alert, Oriented x 3  Spine: No TTP over spinous processes or paraspinal muscles at C/T/L spine. No palpable step off.     Able to actively SLR on left side, limited by pain on right side    Motor exam:        C5       C6       C7       C8       T1   R  4/5      5/5     5/5     3/5      3/5  L   4/5      5/5     5/5     3/5      3/5         L2        L3       L4       L5      S1  R  3/5      3/5     3/5     5/5    5/5  L   5/5     5/5      5/5     5/5    5/5    Sensory:  (0=absent, 1=impaired, 2=normal, NT=not testable)      C5    C6   C7   C8   T1         R   2     2      2     2      2  L    2     2      2     2      2        L2    L3   L4   L5   S1   R   2     2     2     2     2  L    2     2     2     2     2    Right Upper extremity:   Negative Lim  +Rad Pulse  Compartments soft and compressible    Left Upper extremity:   Negative Lim  +Rad Pulse  Compartments soft and compressible    Right Lower Extremity:   SILT L2-S1  Skin intact, No erythema  Mild swelling to right upper thigh  Mild TTP to right hip/groin area  Negative Clonus  Negative Babinski  +DP  Compartments soft and compressible           Left Lower Extremity:  SILT L2-S1  Negative Clonus   Negative Babinski  +DP  Compartments soft and compressible    Secondary  Skin intact. No erythema/ecchymosis. No TTP over bony prominences, SILT, palpable pulses, full/painless A/PROM, compartments soft. No other injuries or complaints. Negative logroll/heelstrike BL.     A/P: Patient is a 82F with a PMH of Afib (Coumadin), aplastic anemia, COPD, orthostatic hypotension (midodrine) was sent to [facility] ED after having symptoms of dizziness, weakness and lightheadedness prior to her scheduled elective Kyphoplasty at [facility].     - Medical consult, clearance needed  - Heme/Onc consult, clearance needed  - CT: right lower extremity hematomas  - FU R Hip XR  - 2U pRBC ordered, FU CBC  - Patient requires medical optimization prior to OR tomorrow/Saturday  - Dr. Chambers is aware of the plan, and will advise if plan changes Orthopedics    Patient is a 82F who presents to [facility] ED from Dr. Chambers after her initial planed kyphoplasty for today at [facility] was cancelled secondary to symptomatic anemia. PMH is significant for Afib (Coumadin and Amio), HLD, COPD, IBS, Aplastic Anemia, osteoporosis, neuropathy, PMR (Prednisone), s/p ORIF of right hip in 2016.    Patient 's recent hemoglobin per her oncologist was 7.9 and was instructed to come to the ED for blood transfusion. Patient states her baseline Hgb is around 9. Patient describes right leg pain, mild weakness and dizziness this morning. Denies any recent falls or trauma. Patient's also complains of right leg pain which began two days ago secondary to no acute trauma or fall. Patient endorses having difficulty walking for the past two days. Patient states that she was on Coumadin until 1 week ago when she was instructed to start Lovenox 80mg BID per her hematologist. Her last dose was last night.     Vital Signs (24 Hrs):  T(C): 37 (09-14-23 @ 15:30), Max: 37 (09-14-23 @ 15:30)  HR: 79 (09-14-23 @ 15:30) (79 - 90)  BP: 92/53 (09-14-23 @ 15:30) (92/53 - 105/67)  RR: 18 (09-14-23 @ 15:30) (18 - 18)  SpO2: 98% (09-14-23 @ 15:30) (95% - 98%)  Wt(kg): --    LABS:                          6.9    6.99  )-----------( 289      ( 14 Sep 2023 11:30 )             21.9     09-14    137  |  108  |  30<H>  ----------------------------<  118<H>  4.3   |  23  |  1.35<H>    Ca    9.1      14 Sep 2023 11:30    TPro  6.6  /  Alb  3.7  /  TBili  1.0  /  DBili  x   /  AST  9<L>  /  ALT  18  /  AlkPhos  69  09-14    LIVER FUNCTIONS - ( 14 Sep 2023 11:30 )  Alb: 3.7 g/dL / Pro: 6.6 gm/dL / ALK PHOS: 69 U/L / ALT: 18 U/L / AST: 9 U/L / GGT: x           PT/INR - ( 14 Sep 2023 11:30 )   PT: 14.3 sec;   INR: 1.21 ratio         PTT - ( 14 Sep 2023 11:30 )  PTT:34.6 sec    Physical Exam:  General: NAD, lying comfortably in bed    Spine:  PHYSICAL EXAM:  General; Awake and alert, Oriented x 3  Spine: No TTP over spinous processes or paraspinal muscles at C/T/L spine. No palpable step off.     Able to actively SLR on left side, limited by pain on right side    Motor exam:        C5       C6       C7       C8       T1   R  4/5      5/5     5/5     3/5      3/5  L   4/5      5/5     5/5     3/5      3/5         L2        L3       L4       L5      S1  R  3/5      3/5     3/5     5/5    5/5  L   5/5     5/5      5/5     5/5    5/5    Sensory:  (0=absent, 1=impaired, 2=normal, NT=not testable)      C5    C6   C7   C8   T1         R   2     2      2     2      2  L    2     2      2     2      2        L2    L3   L4   L5   S1   R   2     2     2     2     2  L    2     2     2     2     2    Right Upper extremity:   Negative Lim  +Rad Pulse  Compartments soft and compressible    Left Upper extremity:   Negative Lim  +Rad Pulse  Compartments soft and compressible    Right Lower Extremity:   SILT L2-S1  Skin intact, No erythema  Mild swelling to right upper thigh  Mild TTP to right hip/groin area  Negative Clonus  Negative Babinski  +DP  Compartments soft and compressible           Left Lower Extremity:  SILT L2-S1  Negative Clonus   Negative Babinski  +DP  Compartments soft and compressible    Secondary  Skin intact. No erythema/ecchymosis. No TTP over bony prominences, SILT, palpable pulses, full/painless A/PROM, compartments soft. No other injuries or complaints. Negative logroll/heelstrike BL.     A/P: Patient is a 82F with a PMH of Afib (Coumadin), aplastic anemia, COPD, orthostatic hypotension (midodrine) was sent to [facility] ED after having symptoms of dizziness, weakness and lightheadedness prior to her scheduled elective Kyphoplasty at [facility].     - Medical consult, clearance needed  - Heme/Onc consult, clearance needed  - CT: right lower extremity hematomas  - FU R Hip XR  - 2U pRBC ordered, FU CBC  - Patient requires medical optimization prior to OR tomorrow/Saturday  - Dr. Chambers is aware of the plan, and will advise if plan changes

## 2023-09-14 NOTE — PATIENT PROFILE ADULT - FALL HARM RISK - HARM RISK INTERVENTIONS
Assistance with ambulation/Assistance OOB with selected safe patient handling equipment/Communicate Risk of Fall with Harm to all staff/Discuss with provider need for PT consult/Monitor gait and stability/Provide patient with walking aids - walker, cane, crutches/Reinforce activity limits and safety measures with patient and family/Sit up slowly, dangle for a short time, stand at bedside before walking/Tailored Fall Risk Interventions/Visual Cue: Yellow wristband and red socks/Bed in lowest position, wheels locked, appropriate side rails in place/Call bell, personal items and telephone in reach/Instruct patient to call for assistance before getting out of bed or chair/Non-slip footwear when patient is out of bed/[place] to call system/Physically safe environment - no spills, clutter or unnecessary equipment/Purposeful Proactive Rounding/Room/bathroom lighting operational, light cord in reach

## 2023-09-14 NOTE — ED PROVIDER NOTE - ATTENDING APP SHARED VISIT CONTRIBUTION OF CARE
Patient p/w active symptomatic anemia, h/o multiple transfusions secondary to aplastic anemia, on lovenox for afib due to subtherapeutic INR on coumadin.  No melena or bloody stools, no h/o GIB.  Due for kyphoplasty with Dr. Chambers who recommends admission.  BP stable here, was hypotensive in the 80's this am.

## 2023-09-14 NOTE — ED ADULT NURSE NOTE - OBJECTIVE STATEMENT
Received pt alert and oriented x3, breathing even and unlabored no distress noted. Pt sent to ER from surgery due to low BP and low hgb. Pt presents with mediport to right chest wall. Mediport accessed per protocol and dressed with sterile dressing. Pt tolerated well. Flushed well with blood return. Labs drawn and sent. Pending dispo.

## 2023-09-14 NOTE — CONSULT NOTE ADULT - PROBLEM SELECTOR RECOMMENDATION 7
Being planned for kyphoplasty by ortho team  medical risk assessment/optimization pending further work up and clinical course  pain control--> oxy prn for severe pain, IV morphine for breakthrough pain  continue gabapentin

## 2023-09-14 NOTE — ED ADULT NURSE NOTE - NSFALLRISKINTERV_ED_ALL_ED

## 2023-09-14 NOTE — CONSULT NOTE ADULT - SUBJECTIVE AND OBJECTIVE BOX
Hematology Consult Note    HPI:  81 y/o female with afib was on coumadin but currently on lovenox, aplastic anemia, copd, PMR, htn sent in by preop testing for low bp and low hemoglobin. Pt states she went to see her oncologist , had blood work done showing hgb 7.9 and was told to come to the ED for blood transfusion    Allergies    No Known Allergies    Intolerances        MEDICATIONS  (STANDING):    MEDICATIONS  (PRN):      PAST MEDICAL & SURGICAL HISTORY:  COPD (chronic obstructive pulmonary disease)      DM (diabetes mellitus)      PAF (paroxysmal atrial fibrillation)  s/p ablation      Hiatal hernia      GIB (gastrointestinal bleeding)      H/O aplastic anemia      History of IBS      H/O osteoporosis      HLD (hyperlipidemia)      PMR (polymyalgia rheumatica)      History of basal cell cancer      S/P hysterectomy      S/P foot surgery      S/P knee surgery      After cataract  bilateral eye cataract surgically removed      Closed right hip fracture  rigth hip ORIF july 19 2016      S/P IVC filter  nov 2016      S/P carpal tunnel release  Right 2008 & 6/27/17          FAMILY HISTORY:  Family history of bladder cancer (Mother)    Family history of myocardial infarction (Father)        SOCIAL HISTORY: No EtOH, no tobacco    REVIEW OF SYSTEMS:    CONSTITUTIONAL: c/o gen weakness and dizziness w/ some right groin pain   EYES/ENT: No visual changes;  No vertigo or throat pain   NECK: No pain or stiffness  RESPIRATORY: No cough, wheezing, hemoptysis; No shortness of breath  CARDIOVASCULAR: No chest pain or palpitations  GASTROINTESTINAL: No abdominal or epigastric pain. No nausea, vomiting, or hematemesis; No diarrhea or constipation. No melena or hematochezia.  GENITOURINARY: No dysuria, frequency or hematuria  NEUROLOGICAL: No numbness or weakness  SKIN: No itching, burning, rashes, or lesions   All other review of systems is negative unless indicated above.    Height (cm): 165.1 (09-14 @ 10:41)  Weight (kg): 79.4 (09-14 @ 10:41)  BMI (kg/m2): 29.1 (09-14 @ 10:41)  BSA (m2): 1.87 (09-14 @ 10:41)    T(F): 97.9 (09-14-23 @ 10:41), Max: 97.9 (09-14-23 @ 10:41)  HR: 82 (09-14-23 @ 10:41)  BP: 100/54 (09-14-23 @ 10:41)  RR: 18 (09-14-23 @ 10:41)  SpO2: 98% (09-14-23 @ 10:41)  Wt(kg): --    GENERAL: NAD, well-developed  HEAD:  Atraumatic, Normocephalic  EYES: EOMI, PERRLA, conjunctiva and sclera clear  NECK: Supple, No JVD  CHEST/LUNG: Clear to auscultation bilaterally; No wheeze  HEART: Regular rate and rhythm; No murmurs, rubs, or gallops  ABDOMEN: distended but Soft, Nontender,  Bowel sounds present  EXTREMITIES:  2+ Peripheral Pulses, No clubbing, cyanosis, or edema  NEUROLOGY: non-focal  SKIN: No rashes or lesions                          6.9    6.99  )-----------( 289      ( 14 Sep 2023 11:30 )             21.9       09-14    137  |  108  |  30<H>  ----------------------------<  118<H>  4.3   |  23  |  1.35<H>    Ca    9.1      14 Sep 2023 11:30    TPro  6.6  /  Alb  3.7  /  TBili  1.0  /  DBili  x   /  AST  9<L>  /  ALT  18  /  AlkPhos  69  09-14    < from: CT Chest No Cont (09.02.23 @ 14:31) >  ACC: [id number] EXAM:  CT CHEST   ORDERED BY: MAXIMO VELASCO     PROCEDURE DATE:  09/02/2023          INTERPRETATION:  CLINICAL INFORMATION: 82-year-old female with cough and   congestion for exclusion of pneumonia.    COMPARISON: CT chest 10/6/2022    CONTRAST/COMPLICATIONS:  IV Contrast: NONE  Oral Contrast: NONE  Complications: None reported at time of study completion    PROCEDURE:  CT of the Chest was performed.  Sagittal and coronal reformats were performed.    FINDINGS:    LUNGS AND AIRWAYS and PLEURA : Central airways are patent. Again noted   are dendritic calcifications within the right middle and right lower   lobes possibly the sequela of chronic inflammation/aspiration. There has   been resolution of right lower lobe consolidation with new focal   consolidation involving the posterior basal segment left lower lobe.   There are multiple new solid subcentimeter nodules in the left lower lobe   which are clustered and may be infectious and/or inflammatory largest   measuring 5 mm (4:146). Stable asymmetric pleural parenchymal nodular   scarring left greater than right. There is a new linear shaped nodular   opacity within the apical segment of the left upper lobe measuring 11 x 7   x 8 mm in transverse by AP by cephalocaudal dimensions (4:62, and 601:55).  MEDIASTINUM AND MARIA DEL CARMEN: There has been interval decrease in the size and   number of mediastinal lymph nodes consistent with improving probable   reactive lymphadenopathy.  VESSELS: Small calcified aortic mural plaque. No evidence of aortic   aneurysm. There is persistent dilatation of the main pulmonary artery   measuring 3.8 cm. Pulmonary hypertension cannot be excluded.  HEART: Heart size is normal. Persistent scant pericardial effusion which   is decreased slightly from prior imaging.  CHEST WALL AND LOWER NECK: Stable mild left thyroid gland substernal   extension. No significant mass effect on the tracheal air column.  VISUALIZED UPPER ABDOMEN: Within normal limits.  BONES: No developing aggressive osseous lesions. Right anterior chest   wall infusion port catheter the tip located in the superior cavoatrial   junction. Stable compression fracture deformities of L1, L2 and L3.    IMPRESSION:    1. Resolution of right lower lobe basilar segment consolidation with new   left posterior basal segmental consolidation.  2. Newly developing solid nodules within the left lower lobe the largest   measuring 5 mm (may be infectious and/or inflammatory as there appear   clustered however short interval surveillance CT in 6-12 months is   suggested.  3. New oval nodular opacity within the apical segment of the left upper   lobe measuring up to 11 mm. Whole-body PET/CT and histological sampling   is suggested to exclude developing left upper lobe neoplasm.  4.Dendritic calcification of the lung unchanged. Consent with the sequela   of chronic infectious and/or inflammatory etiologies and chronic   aspiration cannot be excluded.  5. Persistent dilatation of the main pulmonary artery measuring 3.8 cm.   Pulmonary hypertension cannot be excluded.        --- End of Report ---          Manual Differential (09.14.23 @ 11:30)   Manual Smear Verification: Performed  Stomatocytes: Slight  Tear Drops: Slight  Poikilocytosis: Slight  Polychromasia: Slight  Ovalocytes: Slight  Microcytosis: Moderate  Hypochromia: Slight  Anisocytosis: Moderate  Basophilic Stippling: Present  Red Cell Morphology: Abnormal  Elliptocytes: Slight  Platelet Morphology: Normal  Band Neutrophils %: 4.0 %  Nucleated RBC: 3 /100  Smudge Cells: Present          
82 years old female with h/o Afib, PMR, aplastic anemia, HLD, orthostatic hypotension on midodrine present to ED with complain of low Hb from preop. Patient reported generalized weakness and lightheadedness. Denied any chest pain or SOB. No vomiting of blood or rectal bleed. Patient reported stool slightly dark as she took pepto bismol recently. Last transfuion was around 04/2023. Currently coumadin on hold and on lovenox. Patient does noted slight right thigh pain and swelling for last day or so.   Hemodynamically stable, afebrile, sat well at RA. No leukocytosis, Hb 6.9 ( 9.1 9/7/23), plt 289, K 4.3, Cr 1.35    SH: no toxic habits  FH: family h/o CAD    ROS: All ROS were negative except generalized weakness, lightheadedness, back pain, right thigh pain    Physical Exam  CONSTITUTIONAL: alert and cooperative, no acute distress  EYES: PERRL, no scleral icterus  ENT: Mucosa moist, tongue normal  NECK: Neck supple, trachea midline, non-tender  CARDIAC: Normal S1 and S2. Regular rate and rhythms. No Pedal edema. Peripheral pulses intact  LUNGS: Equal air entry both lungs. No rales, rhonchi, wheezing. Normal respiratory effort.   ABDOMEN: Soft, nondistended, nontender. No guarding or rebound tenderness. Bowel sound normal.   MUSCULOSKELETAL: Normocephalic, atraumatic. Slight right upper thigh tenderness, questionable induration in muscle. No external bruise noted.  NEUROLOGICAL: No gross motor or sensory deficits  SKIN: no lesions or eruptions. Normal turgor  PSYCHIATRIC: A&O x 3, appropriate mood and affect

## 2023-09-14 NOTE — CONSULT NOTE ADULT - PROBLEM SELECTOR RECOMMENDATION 9
brought in from preop for low Hb, dizziness, generalized weakness  h/o aplastic anemia, last transfusion on 04/2023, following with outpatient hematology, getting procrit, IVIG   Hb 6.9 ( 9.1 9/7/23), plt 289  1 unit of PRBC ordered  right thigh pain---> CT RLE to R/O hematoma given patient on AC and Hb drop from 9--> 7 within short period of time. Also get CT abd/pelvis  Check FOBT  Hematology consulted  Monitor H/H, transfuse as needed to keep Hb> 7 or if patient is symptomatic  Hold lovenox for now until Hb stable and bleeding is ruled out

## 2023-09-14 NOTE — ED ADULT NURSE NOTE - ED STAT RN HANDOFF DETAILS
Patient endorsed to SENIA Cancino for admission to floor, nurse made aware pt is pre-op patient in need of blood transfusion prior to surgery. Also Pt NPO.

## 2023-09-14 NOTE — CONSULT NOTE ADULT - PROBLEM SELECTOR RECOMMENDATION 2
h/o PMR on steroid since around 05/2023, gradually tapering down  Currently on prednisone 10mg daily  Continue current prednisone dose  If any surgical intervention is planned, give stress dose of steroid, i.e,  50mg IV hydrocortisone prior to OR, then 25mg q8hr for 1 day, then resume home dose thereafter

## 2023-09-14 NOTE — PATIENT PROFILE ADULT - VISION (WITH CORRECTIVE LENSES IF THE PATIENT USUALLY WEARS THEM):
Normal vision: sees adequately in most situations; can see medication labels, newsprint
standing/walking

## 2023-09-14 NOTE — PATIENT PROFILE ADULT - NSTRANSFERBELONGINGSDISPO_GEN_A_NUR
Ventricular Assist Device (VAD)  Interrogation Form    This patient’s Ventricular Assist Device  (VAD) has been interrogated  The following items have been evaluated (check all that apply)    []  Pump speed has been adjusted  []  Pump rate had been adjusted  []  Pump percent systole has been adjusted  []  Pump drive pressure has been adjusted  []  Pump vacuum has been adjusted  [x]  Alarm history has been reviewed  []  Data has been downloaded from the device and sent to the device company  []  VAD has been turned off  [] Other:     For Continuous Flow Devices: Please check:  []  Heartmate II   [x]  Heartmate 3   []   Heartware    []  Levitronix       Prior to adjustment After adjustment   Speed 5600 rpm; low speed 5200 rpm    Power 4.4W    PI 3.6    HCT 31    Flow/Output 5.3 lpm      For Pulsatile Devices: Please Check: []  [employer] MAU   []   Thoratec            Prior To Adjustment       After Adjustment   Rate     % Systole     Vacuum      Left Right Left  Right   Output       Fill Volumes       Drive Pressure         If someone other than the physician records these values, please enter name and date:       Name: ANGELA HERNÁNDEZ    Date: 8/4/2020    Time: 1430    List any alarms and how situation was resolved: Had a no external power alarm on 8/2 ( talked about importance of maintaining power).  Has no PI events in history.  Will have pump off alarm due to switching out modular cable in clinic today.    Physician’s interpretation of situation:   LVAD alarm history reviewed with clinical engineering team.  Appropriately functioning LVAD at current RPM.  No change in device settings is required at this time.    Riaz Sheppard MD  Advanced Heart Failure & Transplant Cardiology  [facility]  Pager: [phone]         with patient

## 2023-09-14 NOTE — CONSULT NOTE ADULT - PROBLEM SELECTOR RECOMMENDATION 4
s/p ablation, following with outpatient cardiology, last seen 9/7/23 ( terry ROSENBERG note). per note, had a [facility] in 2/2021 that showed symptomatic PACs and PAT episodes. There was no AF seen.  Was on coumadin being held with plan for OR, on lovenox. Will hold for now given anemia  If bleeding is ruled out and Hb remain stable, will resume lovenox

## 2023-09-14 NOTE — CONSULT NOTE ADULT - ASSESSMENT
incomplete     81 y/o female with afib was on coumadin but currently on lovenox, aplastic anemia, copd, PMR, htn sent in by preop testing for low bp and low hemoglobin. Pt states she went to see her oncologist , had blood work done showing hgb 7.9 and was told to come to the ED for blood transfusion.    #Macrocytic anemia   -patient has hx of chronic anemia stated she is seen by Dr. Hari Parker [phone] for aplastic anemia, myelodysplasia and IgG subclass def. being tx w. weekly procrit inj and, IVIG transfusion 1x/month  -patient baseline hgb around 9  -on 9/7 hgb 9.1 when presented to ER (9/14) hgb 6.9  -no large ecchymotic areas noted  -manual smear noted for smudge cells, and basophilic stippling- no note of schistocytes    -r/o acute bleeding  -pending CT A/P and R leg (c/o pain)  -pending anemia/ hemolysis w/u   -pending FOBT  -maintain hgb >7 or if symptomatic       Thank you for the referral. Will continue to monitor the patient.  Please call with any questions [phone]  Above reviewed with Attending Dr. Jose CALDERON/[facility]  [address] [facility], [address], [place], [place]  [phone]  *Note not finalized until signed by Attending Physician    81 y/o female with afib was on coumadin but currently on lovenox, aplastic anemia, copd, PMR, htn sent in by preop testing for low bp and low hemoglobin. Pt states she went to see her oncologist , had blood work done showing hgb 7.9 and was told to come to the ED for blood transfusion.    #Macrocytic anemia   -patient has hx of chronic anemia stated she is seen by Dr. Hari Parker [phone] for aplastic anemia, myelodysplasia and IgG subclass def. being tx w. weekly procrit inj and, IVIG transfusion 1x/month  -patient baseline hgb around 9  -on 9/7 hgb 9.1 when presented to ER (9/14) hgb 6.9  -no large ecchymotic areas noted  -manual smear noted for smudge cells, and basophilic stippling- no note of schistocytes    -r/o acute bleeding  -pending CT A/P and R leg (c/o pain)  -pending anemia/ hemolysis w/u   -pending FOBT  -maintain hgb >7 or if symptomatic       Thank you for the referral. Will continue to monitor the patient.  Please call with any questions [phone]  Above reviewed with Attending Dr. Jose CALDERON/[facility]  [address] [facility], [address], [place], [place]  [phone]  *Note not finalized until signed by Attending Physician

## 2023-09-14 NOTE — ED PROVIDER NOTE - CLINICAL SUMMARY MEDICAL DECISION MAKING FREE TEXT BOX
83 y/o female with afib on lovenox, aplastic anemia, copd, PMR here with low hgb and low bp c/o generalized weakness.   Will check labs including hgb, type and screen and transfuse as indicated and will likely admit.

## 2023-09-14 NOTE — ED ADULT TRIAGE NOTE - CHIEF COMPLAINT QUOTE
patient BIB  by staff from Pre-op for low hemoglobin, complaints of dizziness, weakness and lightheadedness.

## 2023-09-14 NOTE — ED PROVIDER NOTE - OBJECTIVE STATEMENT
83 y/o female with afib was on coumadin but currently on lovenox, aplastic anemia, copd, PMR, htn sent in by preop testing for low bp and low hemoglobin. Pt states she went to see her oncologist , had blood work done showing hgb 7.9 and was told to come to the ED for blood transfusion. Pt states her baseline hgb is around 9.  Pt was scheduled for spine surgery, went to preop testing today and sent down for low bp. Pt reports having generalized weakness and dizziness. Denies chest pain, dyspnea, blood in stool, black stool, abdominal pain, vomiting.

## 2023-09-14 NOTE — CONSULT NOTE ADULT - ASSESSMENT
82 years old female with h/o Afib, PMR, aplastic anemia, HLD, orthostatic hypotension on midodrine present to ED with complain of low Hb from preop. Patient reported generalized weakness and lightheadedness. Denied any chest pain or SOB. No vomiting of blood or rectal bleed. Patient reported stool slightly dark as she took pepto bismol recently. Last transfuion was around 04/2023. Currently coumadin on hold and on lovenox. Patient does noted slight right thigh pain and swelling for last day or so.   Hemodynamically stable, afebrile, sat well at RA. No leukocytosis, Hb 6.9 ( 9.1 9/7/23), plt 289, K 4.3, Cr 1.35

## 2023-09-14 NOTE — CONSULT NOTE ADULT - NS ATTEND AMEND GEN_ALL_CORE FT
pt seen and examined. Chart reviewed and case d/w ACP with agreement of her A/P. 83 y/o female with afib was on coumadin but currently on lovenox, ?aplastic anemia, copd, PMR, htn sent in by preop testing for her kyphoplasty procedure due to hypotension. Pt states she went to see her hema/oncologist and was found significant worsening anemia. patient has hx of chronic anemia stated she is seen by Dr. Hari Parker [phone] for ? aplastic anemia, ?  myelodysplasia and IgG subclass def. being tx w. weekly procrit inj and, IVIG transfusion 1x/month. now acute worsening with  Hb 9. CT negative showed LE hematoma. pt has no prior BM biopsy. GI workup uptodate with polyps and prior gastric ulcer but no malignancy. pt refused to have BM biopsy at this admission due to her back pain. anemia workup and flowcytometry sent. RBC transfusion to keep Hb > 7. will f/u

## 2023-09-15 ENCOUNTER — TRANSCRIPTION ENCOUNTER (OUTPATIENT)
Age: 83
End: 2023-09-15

## 2023-09-15 LAB
ANION GAP SERPL CALC-SCNC: 7 MMOL/L — SIGNIFICANT CHANGE UP (ref 5–17)
APTT BLD: 28.5 SEC — SIGNIFICANT CHANGE UP (ref 24.5–35.6)
BUN SERPL-MCNC: 45 MG/DL — HIGH (ref 7–23)
CALCIUM SERPL-MCNC: 8.8 MG/DL — SIGNIFICANT CHANGE UP (ref 8.5–10.1)
CHLORIDE SERPL-SCNC: 106 MMOL/L — SIGNIFICANT CHANGE UP (ref 96–108)
CO2 SERPL-SCNC: 25 MMOL/L — SIGNIFICANT CHANGE UP (ref 22–31)
CREAT SERPL-MCNC: 1.55 MG/DL — HIGH (ref 0.5–1.3)
EGFR: 34 ML/MIN/1.73M2 — LOW
EPO SERPL-MCNC: 184.7 MIU/ML — HIGH (ref 2.6–18.5)
GLUCOSE SERPL-MCNC: 106 MG/DL — HIGH (ref 70–99)
HCT VFR BLD CALC: 25.8 % — LOW (ref 34.5–45)
HCT VFR BLD CALC: 26.4 % — LOW (ref 34.5–45)
HCT VFR BLD CALC: 26.5 % — LOW (ref 34.5–45)
HGB BLD-MCNC: 8.6 G/DL — LOW (ref 11.5–15.5)
HGB BLD-MCNC: 8.7 G/DL — LOW (ref 11.5–15.5)
HGB BLD-MCNC: 9.1 G/DL — LOW (ref 11.5–15.5)
IGA FLD-MCNC: 102 MG/DL — SIGNIFICANT CHANGE UP (ref 84–499)
IGG FLD-MCNC: 616 MG/DL — SIGNIFICANT CHANGE UP (ref 610–1660)
IGM SERPL-MCNC: 192 MG/DL — SIGNIFICANT CHANGE UP (ref 35–242)
INR BLD: 1.06 RATIO — SIGNIFICANT CHANGE UP (ref 0.85–1.18)
KAPPA LC SER QL IFE: 2.54 MG/DL — HIGH (ref 0.33–1.94)
KAPPA/LAMBDA FREE LIGHT CHAIN RATIO, SERUM: 1.64 RATIO — SIGNIFICANT CHANGE UP (ref 0.26–1.65)
LAMBDA LC SER QL IFE: 1.55 MG/DL — SIGNIFICANT CHANGE UP (ref 0.57–2.63)
MCHC RBC-ENTMCNC: 30.1 PG — SIGNIFICANT CHANGE UP (ref 27–34)
MCHC RBC-ENTMCNC: 30.4 PG — SIGNIFICANT CHANGE UP (ref 27–34)
MCHC RBC-ENTMCNC: 30.6 PG — SIGNIFICANT CHANGE UP (ref 27–34)
MCHC RBC-ENTMCNC: 32.6 G/DL — SIGNIFICANT CHANGE UP (ref 32–36)
MCHC RBC-ENTMCNC: 33.7 G/DL — SIGNIFICANT CHANGE UP (ref 32–36)
MCHC RBC-ENTMCNC: 34.3 G/DL — SIGNIFICANT CHANGE UP (ref 32–36)
MCV RBC AUTO: 88.6 FL — SIGNIFICANT CHANGE UP (ref 80–100)
MCV RBC AUTO: 90.8 FL — SIGNIFICANT CHANGE UP (ref 80–100)
MCV RBC AUTO: 92.3 FL — SIGNIFICANT CHANGE UP (ref 80–100)
NRBC # BLD: 3 /100 WBCS — HIGH (ref 0–0)
NRBC # BLD: 4 /100 WBCS — HIGH (ref 0–0)
NRBC # BLD: 4 /100 WBCS — HIGH (ref 0–0)
PLATELET # BLD AUTO: 245 K/UL — SIGNIFICANT CHANGE UP (ref 150–400)
PLATELET # BLD AUTO: 251 K/UL — SIGNIFICANT CHANGE UP (ref 150–400)
PLATELET # BLD AUTO: 262 K/UL — SIGNIFICANT CHANGE UP (ref 150–400)
POTASSIUM SERPL-MCNC: 5 MMOL/L — SIGNIFICANT CHANGE UP (ref 3.5–5.3)
POTASSIUM SERPL-SCNC: 5 MMOL/L — SIGNIFICANT CHANGE UP (ref 3.5–5.3)
PROTHROM AB SERPL-ACNC: 12.7 SEC — SIGNIFICANT CHANGE UP (ref 9.5–13)
RBC # BLD: 2.84 M/UL — LOW (ref 3.8–5.2)
RBC # BLD: 2.86 M/UL — LOW (ref 3.8–5.2)
RBC # BLD: 2.99 M/UL — LOW (ref 3.8–5.2)
RBC # FLD: 23.6 % — HIGH (ref 10.3–14.5)
RBC # FLD: 25.2 % — HIGH (ref 10.3–14.5)
RBC # FLD: 26 % — HIGH (ref 10.3–14.5)
SODIUM SERPL-SCNC: 138 MMOL/L — SIGNIFICANT CHANGE UP (ref 135–145)
WBC # BLD: 6.93 K/UL — SIGNIFICANT CHANGE UP (ref 3.8–10.5)
WBC # BLD: 7.13 K/UL — SIGNIFICANT CHANGE UP (ref 3.8–10.5)
WBC # BLD: 7.96 K/UL — SIGNIFICANT CHANGE UP (ref 3.8–10.5)
WBC # FLD AUTO: 6.93 K/UL — SIGNIFICANT CHANGE UP (ref 3.8–10.5)
WBC # FLD AUTO: 7.13 K/UL — SIGNIFICANT CHANGE UP (ref 3.8–10.5)
WBC # FLD AUTO: 7.96 K/UL — SIGNIFICANT CHANGE UP (ref 3.8–10.5)

## 2023-09-15 PROCEDURE — 99232 SBSQ HOSP IP/OBS MODERATE 35: CPT

## 2023-09-15 PROCEDURE — 76770 US EXAM ABDO BACK WALL COMP: CPT | Mod: 26

## 2023-09-15 RX ORDER — SODIUM CHLORIDE 9 MG/ML
1000 INJECTION, SOLUTION INTRAVENOUS
Refills: 0 | Status: DISCONTINUED | OUTPATIENT
Start: 2023-09-15 | End: 2023-09-16

## 2023-09-15 RX ORDER — DIPHENHYDRAMINE HCL 50 MG
25 CAPSULE ORAL ONCE
Refills: 0 | Status: COMPLETED | OUTPATIENT
Start: 2023-09-15 | End: 2023-09-15

## 2023-09-15 RX ORDER — SODIUM CHLORIDE 9 MG/ML
1000 INJECTION, SOLUTION INTRAVENOUS
Refills: 0 | Status: DISCONTINUED | OUTPATIENT
Start: 2023-09-15 | End: 2023-09-15

## 2023-09-15 RX ORDER — ACETAMINOPHEN 500 MG
650 TABLET ORAL ONCE
Refills: 0 | Status: COMPLETED | OUTPATIENT
Start: 2023-09-15 | End: 2023-09-15

## 2023-09-15 RX ADMIN — Medication 650 MILLIGRAM(S): at 13:59

## 2023-09-15 RX ADMIN — Medication 25 MILLIGRAM(S): at 13:13

## 2023-09-15 RX ADMIN — SENNA PLUS 2 TABLET(S): 8.6 TABLET ORAL at 21:26

## 2023-09-15 RX ADMIN — AMIODARONE HYDROCHLORIDE 100 MILLIGRAM(S): 400 TABLET ORAL at 05:24

## 2023-09-15 RX ADMIN — GABAPENTIN 400 MILLIGRAM(S): 400 CAPSULE ORAL at 05:24

## 2023-09-15 RX ADMIN — Medication 1 MILLIGRAM(S): at 11:21

## 2023-09-15 RX ADMIN — MORPHINE SULFATE 2 MILLIGRAM(S): 50 CAPSULE, EXTENDED RELEASE ORAL at 05:40

## 2023-09-15 RX ADMIN — OXYCODONE HYDROCHLORIDE 5 MILLIGRAM(S): 5 TABLET ORAL at 01:23

## 2023-09-15 RX ADMIN — Medication 25 MILLIGRAM(S): at 00:12

## 2023-09-15 RX ADMIN — BUDESONIDE AND FORMOTEROL FUMARATE DIHYDRATE 2 PUFF(S): 160; 4.5 AEROSOL RESPIRATORY (INHALATION) at 05:23

## 2023-09-15 RX ADMIN — OXYCODONE HYDROCHLORIDE 5 MILLIGRAM(S): 5 TABLET ORAL at 02:23

## 2023-09-15 RX ADMIN — Medication 650 MILLIGRAM(S): at 00:12

## 2023-09-15 RX ADMIN — OXYCODONE HYDROCHLORIDE 5 MILLIGRAM(S): 5 TABLET ORAL at 16:18

## 2023-09-15 RX ADMIN — MIDODRINE HYDROCHLORIDE 10 MILLIGRAM(S): 2.5 TABLET ORAL at 17:33

## 2023-09-15 RX ADMIN — GABAPENTIN 400 MILLIGRAM(S): 400 CAPSULE ORAL at 17:33

## 2023-09-15 RX ADMIN — PANTOPRAZOLE SODIUM 40 MILLIGRAM(S): 20 TABLET, DELAYED RELEASE ORAL at 05:24

## 2023-09-15 RX ADMIN — MONTELUKAST 10 MILLIGRAM(S): 4 TABLET, CHEWABLE ORAL at 21:27

## 2023-09-15 RX ADMIN — MORPHINE SULFATE 2 MILLIGRAM(S): 50 CAPSULE, EXTENDED RELEASE ORAL at 05:25

## 2023-09-15 RX ADMIN — OXYCODONE HYDROCHLORIDE 5 MILLIGRAM(S): 5 TABLET ORAL at 08:39

## 2023-09-15 RX ADMIN — OXYCODONE HYDROCHLORIDE 5 MILLIGRAM(S): 5 TABLET ORAL at 21:27

## 2023-09-15 RX ADMIN — OXYCODONE HYDROCHLORIDE 5 MILLIGRAM(S): 5 TABLET ORAL at 22:27

## 2023-09-15 RX ADMIN — MIDODRINE HYDROCHLORIDE 10 MILLIGRAM(S): 2.5 TABLET ORAL at 11:21

## 2023-09-15 RX ADMIN — OXYCODONE HYDROCHLORIDE 5 MILLIGRAM(S): 5 TABLET ORAL at 15:18

## 2023-09-15 RX ADMIN — BUDESONIDE AND FORMOTEROL FUMARATE DIHYDRATE 2 PUFF(S): 160; 4.5 AEROSOL RESPIRATORY (INHALATION) at 17:33

## 2023-09-15 RX ADMIN — MIDODRINE HYDROCHLORIDE 10 MILLIGRAM(S): 2.5 TABLET ORAL at 05:24

## 2023-09-15 RX ADMIN — SIMVASTATIN 10 MILLIGRAM(S): 20 TABLET, FILM COATED ORAL at 21:28

## 2023-09-15 RX ADMIN — Medication 650 MILLIGRAM(S): at 12:59

## 2023-09-15 RX ADMIN — SODIUM CHLORIDE 50 MILLILITER(S): 9 INJECTION, SOLUTION INTRAVENOUS at 05:23

## 2023-09-15 RX ADMIN — Medication 10 MILLIGRAM(S): at 05:24

## 2023-09-15 RX ADMIN — Medication 650 MILLIGRAM(S): at 01:12

## 2023-09-15 RX ADMIN — OXYCODONE HYDROCHLORIDE 5 MILLIGRAM(S): 5 TABLET ORAL at 09:39

## 2023-09-15 NOTE — PROGRESS NOTE ADULT - SUBJECTIVE AND OBJECTIVE BOX
Heme/Onc Progress note    INTERVAL HPI/OVERNIGHT EVENTS:  Patient S&E at bedside.  patient continues to c/o lower back pain and groin pain. sp 2U PRBC  VITAL SIGNS:  T(F): 98.2 (09-15-23 @ 04:59)  HR: 81 (09-15-23 @ 04:59)  BP: 119/77 (09-15-23 @ 04:59)  RR: 18 (09-15-23 @ 04:59)  SpO2: 95% (09-15-23 @ 04:59)  Wt(kg): --    PHYSICAL EXAM:    Constitutional: some back and groin pain  Eyes: EOMI, sclera non-icteric  Neck: supple, no masses, no JVD  Respiratory: diminished b/l   Cardiovascular: RRR, no M/R/G  Gastrointestinal: soft, NTND, no masses palpable, + BS,  Extremities: no c/c/e  Neurological: AAOx3      MEDICATIONS  (STANDING):  aMIOdarone    Tablet 100 milliGRAM(s) Oral daily  budesonide  80 MICROgram(s)/formoterol 4.5 MICROgram(s) Inhaler 2 Puff(s) Inhalation two times a day  folic acid 1 milliGRAM(s) Oral daily  gabapentin 400 milliGRAM(s) Oral two times a day  influenza  Vaccine (HIGH DOSE) 0.7 milliLiter(s) IntraMuscular once  lactated ringers. 1000 milliLiter(s) (50 mL/Hr) IV Continuous <Continuous>  midodrine. 10 milliGRAM(s) Oral three times a day  montelukast 10 milliGRAM(s) Oral at bedtime  pantoprazole    Tablet 40 milliGRAM(s) Oral before breakfast  predniSONE   Tablet 10 milliGRAM(s) Oral daily  senna 2 Tablet(s) Oral at bedtime  simvastatin 10 milliGRAM(s) Oral at bedtime  tiotropium 2.5 MICROgram(s) Inhaler 2 Puff(s) Inhalation daily    MEDICATIONS  (PRN):  acetaminophen     Tablet .. 650 milliGRAM(s) Oral every 6 hours PRN Mild Pain (1 - 3), Moderate Pain (4 - 6)  morphine  - Injectable 2 milliGRAM(s) IV Push every 6 hours PRN breakthrough pain  oxyCODONE    IR 5 milliGRAM(s) Oral every 6 hours PRN Severe Pain (7 - 10)      Allergies    No Known Allergies    Intolerances        LABS:                        8.7    7.13  )-----------( 262      ( 15 Sep 2023 06:02 )             25.8     09-15    138  |  106  |  45<H>  ----------------------------<  106<H>  5.0   |  25  |  1.55<H>    Ca    8.8      15 Sep 2023 06:02    TPro  6.1  /  Alb  x   /  TBili  x   /  DBili  x   /  AST  x   /  ALT  x   /  AlkPhos  x   09-14    PT/INR - ( 15 Sep 2023 06:02 )   PT: 12.7 sec;   INR: 1.06 ratio         PTT - ( 15 Sep 2023 06:02 )  PTT:28.5 sec  Urinalysis Basic - ( 15 Sep 2023 06:02 )    Color: x / Appearance: x / SG: x / pH: x  Gluc: 106 mg/dL / Ketone: x  / Bili: x / Urobili: x   Blood: x / Protein: x / Nitrite: x   Leuk Esterase: x / RBC: x / WBC x   Sq Epi: x / Non Sq Epi: x / Bacteria: x    Iron with Total Binding Capacity (09.14.23 @ 14:00)   Iron - Total Binding Capacity.: Unable to calculate Test Repeated ug/dL  % Saturation, Iron: Unable to calculate Test Repeated %  Iron Total: 242 ug/dL  Unsaturated Iron Binding Capacity: <20: Test Repeated ug/dL  Ferritin (09.14.23 @ 14:00)   Ferritin: 1342 ng/mL  Haptoglobin, Serum (09.14.23 @ 14:00)   Haptoglobin, Serum: 122 mg/dL  Reticulocyte Count (09.14.23 @ 14:00)   RBC Count: 1.93 M/uL  Reticulocyte Percent: 2.0 %  Absolute Reticulocytes: 39.0 K/uL  Lactate Dehydrogenase, Serum (09.14.23 @ 14:00)   Lactate Dehydrogenase, Serum: 199 U/L  Vitamin B12, Serum (09.14.23 @ 14:00)   Vitamin B12, Serum: 663 pg/mL  Folate, Serum (09.14.23 @ 14:00)   Folate, Serum: 16.2 ng/mL      RADIOLOGY & ADDITIONAL TESTS:  Studies reviewed.    ASSESSMENT & PLAN:   < from: US Duplex Venous Lower Ext Ltd, Right (09.14.23 @ 19:49) >  ACC: [id number] EXAM:  US DPLX LWR EXT VEINS LTD RT   ORDERED BY: SHAHAB REYNOLDS     PROCEDURE DATE:  09/14/2023          INTERPRETATION:  CLINICAL INFORMATION: Right leg pain and swelling    COMPARISON: 8/19/2016    TECHNIQUE: Duplex sonography ofthe RIGHT LOWER extremity veins with   color and spectral Doppler, with and without compression.    FINDINGS:    There is normal compressibility of the right common femoral, femoral and   popliteal veins.  The contralateral common femoral vein is patent.  Doppler examination shows normal spontaneous and phasic flow.    No calf vein thrombosis is detected.    Large complex fluid within the right thigh anterior compartment either   within a muscle belly or in the fascial plane between the subcutaneous   fat and the muscle belly consistent with a large hematoma.    IMPRESSION:  No evidence of right lower extremity deep venous thrombosis.    Large right thigh hematoma.        --- End of Report ---            < from: CT Abdomen and Pelvis No Cont (09.14.23 @ 14:38) >  ACC: [id number] EXAM:  CT ABDOMEN AND PELVIS   ORDERED BY: RICKY MENDEZ     PROCEDURE DATE:  09/14/2023          INTERPRETATION:  CLINICAL INFORMATION: Anemia. On blood thinners.    COMPARISON: Noncontrast CT of the thorax September 2, 2023, noncontrast   CT of the abdomen and pelvis October 8, 2022.    PROCEDURE:  CT of the Abdomen and Pelvis was performed.  Sagittal and coronal reformats were performed.    FINDINGS:  LOWER CHEST: Trace pericardial fluid. Trace tree-in-bud nodularity in the   right middle lobe and right lower lobe, compatible with mild   bronchiolitis. Mild left basilar dependent atelectasis.    LIVER: Within normal limits.  BILE DUCTS: Normal caliber.  GALLBLADDER: Within normal limits.  SPLEEN: Within normal limits.  PANCREAS: Within normal limits.  ADRENALS: 12 mm hypoattenuating nodule in the left adrenal gland which   measures 2 Hounsfield units, compatible with an adenoma. Mild   low-attenuation thickening of the lateral limb of the left adrenal gland.  KIDNEYS/URETERS: Moderately atrophic. Small bilateral renal cysts.    BLADDER: Small layering bladder stones.  REPRODUCTIVE ORGANS: 6 and ovaries have been removed.    BOWEL: Endoscopy clip in the gastric fundus. No bowel obstruction.   Appendix is normal.  PERITONEUM: No ascites. No evidence of hyperattenuating collection within   the peritoneal cavity or retroperitoneum to suggest acute hemorrhage.  VESSELS: IVC filter. The pelvic veins are diminutive. Atherosclerotic   calcification of the nonaneurysmal abdominal aorta.  RETROPERITONEUM/LYMPH NODES: No lymphadenopathy.  ABDOMINAL WALL: Within normal limits.  BONES: There has been interval progression of a compression fracture of   the superior L1 vertebral body since October 2022. Chronic-appearing   compression deformities of T10 and T12.    Status post open reduction internal fixation of an intertrochanteric   fracture of the right femur. Curvilinear sclerosis and subchondral cystic   resorptive changes of the bilateral femoral heads. There are incompletely   visualized heterogeneously hyperattenuating collections within the   anterior and medial right thigh with hematocrit levels, compatible with   hematoma.    IMPRESSION: Right lower extremity hematomas, incompletely visualized.   Please refer to dedicated CT of the right lower extremity performed same   day. No evidence of intraperitoneal or retroperitoneal hemorrhage.   Interval progression of a L1 compression deformity since October 2022.    --- End of Report ---      < from: CT Lower Extremity No Cont, Right (09.14.23 @ 14:27) >  ACC: [id number] EXAM:  CT LWR EXT RT   ORDERED BY: SHAHAB REYNOLDS     PROCEDURE DATE:  09/14/2023          INTERPRETATION:  Exam Type: CT LOWER EXTREMITY RIGHT  Exam Date and Time: 9/14/2023 2:27 PM  Indication: Thigh pain and swelling with concern for hematoma  Comparison: Pelvis radiograph on 8/20/2023    TECHNIQUE:  Multiplanar CT images of the right lower extremity were obtained without   contrast.    FINDINGS:    Status post intramedullary haydee and screw fixation of the right femur with   adjacent heterotrophic ossification. Post surgical changes are seen   within the subcutaneous tissues.    Enlargement is seen within the adductor muscles roughly spanning 18.2 cm   craniocaudad beginning at the level of the pubic symphysis and extending   distally with medial femur. Additional enlargement of the vastus medialis   spanning roughly 26 cm craniocaudad beginning at the level of the pubic   symphysis and extending distally throughout the anterior to medial femur.    Small hyperdense focusis seen within the subcutaneous tissues posterior   to the right gluteus muscles measuring 3.0 x 2.4 cm possibly   corresponding to hematoma versus postsurgical changes. Clinical   correlation is advised.    IMPRESSION:  Enlargement is seen within the adductor muscles roughly spanning 18.2 cm   craniocaudad beginning at the level of the pubic symphysis and extending   distally with medial femur. Additional enlargement of the vastus medialis   spanning roughly 26 cm craniocaudad beginning at the level of the pubic   symphysis and extending distally throughout the anterior to medial femur.   Limited assessment on this noncontrast CT however the differential   includes hematoma versus abscess formation. Clinical correlation for   recent trauma versus history of anticoagulation versus signs of   infectious etiology is advised.    --- End of Report ---

## 2023-09-15 NOTE — PROGRESS NOTE ADULT - SUBJECTIVE AND OBJECTIVE BOX
Patient is a 82y old  Female who presents with a chief complaint of Abnormal lab values - preoperative (15 Sep 2023 08:34)      INTERVAL HPI/OVERNIGHT EVENTS:  Pt was seen and examined, no acute events.      MEDICATIONS  (STANDING):  aMIOdarone    Tablet 100 milliGRAM(s) Oral daily  budesonide  80 MICROgram(s)/formoterol 4.5 MICROgram(s) Inhaler 2 Puff(s) Inhalation two times a day  folic acid 1 milliGRAM(s) Oral daily  gabapentin 400 milliGRAM(s) Oral two times a day  influenza  Vaccine (HIGH DOSE) 0.7 milliLiter(s) IntraMuscular once  lactated ringers. 1000 milliLiter(s) (50 mL/Hr) IV Continuous <Continuous>  midodrine. 10 milliGRAM(s) Oral three times a day  montelukast 10 milliGRAM(s) Oral at bedtime  pantoprazole    Tablet 40 milliGRAM(s) Oral before breakfast  predniSONE   Tablet 10 milliGRAM(s) Oral daily  senna 2 Tablet(s) Oral at bedtime  simvastatin 10 milliGRAM(s) Oral at bedtime  tiotropium 2.5 MICROgram(s) Inhaler 2 Puff(s) Inhalation daily    MEDICATIONS  (PRN):  acetaminophen     Tablet .. 650 milliGRAM(s) Oral every 6 hours PRN Mild Pain (1 - 3), Moderate Pain (4 - 6)  morphine  - Injectable 2 milliGRAM(s) IV Push every 6 hours PRN breakthrough pain  oxyCODONE    IR 5 milliGRAM(s) Oral every 6 hours PRN Severe Pain (7 - 10)      Allergies    No Known Allergies    Intolerances          Vital Signs Last 24 Hrs  T(C): 36.7 (15 Sep 2023 11:19), Max: 37.2 (14 Sep 2023 20:30)  T(F): 98.1 (15 Sep 2023 11:19), Max: 98.9 (14 Sep 2023 20:30)  HR: 80 (15 Sep 2023 11:19) (78 - 90)  BP: 110/72 (15 Sep 2023 11:19) (92/53 - 119/77)  BP(mean): --  RR: 18 (15 Sep 2023 11:19) (18 - 18)  SpO2: 96% (15 Sep 2023 11:19) (95% - 98%)    Parameters below as of 15 Sep 2023 11:19  Patient On (Oxygen Delivery Method): room air        PHYSICAL EXAM:  GENERAL: NAD  HEAD:  Atraumatic  EYES: PERRLA  ENMT: Mouth moist   NECK: Supple  NERVOUS SYSTEM:  awake, alert, non focal  CHEST/LUNG: Clear  HEART: RRR, S1, S2  ABDOMEN: Soft, non tender  EXTREMITIES:  no edema BL LE, rt thigh swelling, + temderness  SKIN: No rash    LABS:                        8.7    7.13  )-----------( 262      ( 15 Sep 2023 06:02 )             25.8     09-15    138  |  106  |  45<H>  ----------------------------<  106<H>  5.0   |  25  |  1.55<H>    Ca    8.8      15 Sep 2023 06:02    TPro  6.1  /  Alb  x   /  TBili  x   /  DBili  x   /  AST  x   /  ALT  x   /  AlkPhos  x   09-14    PT/INR - ( 15 Sep 2023 06:02 )   PT: 12.7 sec;   INR: 1.06 ratio         PTT - ( 15 Sep 2023 06:02 )  PTT:28.5 sec  Urinalysis Basic - ( 15 Sep 2023 06:02 )    Color: x / Appearance: x / SG: x / pH: x  Gluc: 106 mg/dL / Ketone: x  / Bili: x / Urobili: x   Blood: x / Protein: x / Nitrite: x   Leuk Esterase: x / RBC: x / WBC x   Sq Epi: x / Non Sq Epi: x / Bacteria: x      CAPILLARY BLOOD GLUCOSE          RADIOLOGY & ADDITIONAL TESTS:    Imaging Personally Reviewed:  [ ] YES  [ ] NO    Consultant(s) Notes Reviewed:  [ ] YES  [ ] NO    Care Discussed with Consultants/Other Providers [ ] YES  [ ] NO Patient is a 82y old  Female who presents with a chief complaint of Abnormal lab values - preoperative (15 Sep 2023 08:34)      INTERVAL HPI/OVERNIGHT EVENTS:  Pt was seen and examined, no acute events.      MEDICATIONS  (STANDING):  aMIOdarone    Tablet 100 milliGRAM(s) Oral daily  budesonide  80 MICROgram(s)/formoterol 4.5 MICROgram(s) Inhaler 2 Puff(s) Inhalation two times a day  folic acid 1 milliGRAM(s) Oral daily  gabapentin 400 milliGRAM(s) Oral two times a day  influenza  Vaccine (HIGH DOSE) 0.7 milliLiter(s) IntraMuscular once  lactated ringers. 1000 milliLiter(s) (50 mL/Hr) IV Continuous <Continuous>  midodrine. 10 milliGRAM(s) Oral three times a day  montelukast 10 milliGRAM(s) Oral at bedtime  pantoprazole    Tablet 40 milliGRAM(s) Oral before breakfast  predniSONE   Tablet 10 milliGRAM(s) Oral daily  senna 2 Tablet(s) Oral at bedtime  simvastatin 10 milliGRAM(s) Oral at bedtime  tiotropium 2.5 MICROgram(s) Inhaler 2 Puff(s) Inhalation daily    MEDICATIONS  (PRN):  acetaminophen     Tablet .. 650 milliGRAM(s) Oral every 6 hours PRN Mild Pain (1 - 3), Moderate Pain (4 - 6)  morphine  - Injectable 2 milliGRAM(s) IV Push every 6 hours PRN breakthrough pain  oxyCODONE    IR 5 milliGRAM(s) Oral every 6 hours PRN Severe Pain (7 - 10)      Allergies    No Known Allergies    Intolerances          Vital Signs Last 24 Hrs  T(C): 36.7 (15 Sep 2023 11:19), Max: 37.2 (14 Sep 2023 20:30)  T(F): 98.1 (15 Sep 2023 11:19), Max: 98.9 (14 Sep 2023 20:30)  HR: 80 (15 Sep 2023 11:19) (78 - 90)  BP: 110/72 (15 Sep 2023 11:19) (92/53 - 119/77)  BP(mean): --  RR: 18 (15 Sep 2023 11:19) (18 - 18)  SpO2: 96% (15 Sep 2023 11:19) (95% - 98%)    Parameters below as of 15 Sep 2023 11:19  Patient On (Oxygen Delivery Method): room air        PHYSICAL EXAM:  GENERAL: NAD  HEAD:  Atraumatic  EYES: PERRLA  ENMT: Mouth moist   NECK: Supple  NERVOUS SYSTEM:  awake, alert, non focal  CHEST/LUNG: Clear  HEART: RRR, S1, S2  ABDOMEN: Soft, non tender  EXTREMITIES:  no edema BL LE, rt thigh swelling, + tenderness  SKIN: No rash    LABS:                        8.7    7.13  )-----------( 262      ( 15 Sep 2023 06:02 )             25.8     09-15    138  |  106  |  45<H>  ----------------------------<  106<H>  5.0   |  25  |  1.55<H>    Ca    8.8      15 Sep 2023 06:02    TPro  6.1  /  Alb  x   /  TBili  x   /  DBili  x   /  AST  x   /  ALT  x   /  AlkPhos  x   09-14    PT/INR - ( 15 Sep 2023 06:02 )   PT: 12.7 sec;   INR: 1.06 ratio         PTT - ( 15 Sep 2023 06:02 )  PTT:28.5 sec  Urinalysis Basic - ( 15 Sep 2023 06:02 )    Color: x / Appearance: x / SG: x / pH: x  Gluc: 106 mg/dL / Ketone: x  / Bili: x / Urobili: x   Blood: x / Protein: x / Nitrite: x   Leuk Esterase: x / RBC: x / WBC x   Sq Epi: x / Non Sq Epi: x / Bacteria: x      CAPILLARY BLOOD GLUCOSE          RADIOLOGY & ADDITIONAL TESTS:    Imaging Personally Reviewed:  [ ] YES  [ ] NO    Consultant(s) Notes Reviewed:  [ ] YES  [ ] NO    Care Discussed with Consultants/Other Providers [ ] YES  [ ] NO

## 2023-09-15 NOTE — DIETITIAN INITIAL EVALUATION ADULT - OTHER INFO
Pt with PMH of paroxysmal Afib, HLD, aplastic anemia, polymyalgia rheumatica, orthostatic hypotension; admitted for symptomatic anemia (s/p transfusions) & back pain. Plan for kyphoplasty tomorrow (09/16).  States good appetite and good PO intake PTA; eats mostly regular diet, however avoids leafy greens due to being on Coumadin. Continues with mostly good appetite; consuming % of meals during admission. Denies any chew/swallowing difficulty or any N/V/D/C. Confirms no food allergies.  States she had hx of DM many years ago, however doctor told her she no longer had DM and has been off anti-DM meds for 7 years. BG levels appear within normal/acceptable range during admission.  States UBW approximately 175 lbs. & current wt 184 lbs.

## 2023-09-15 NOTE — DIETITIAN INITIAL EVALUATION ADULT - PERTINENT MEDS FT
MEDICATIONS  (STANDING):  aMIOdarone    Tablet 100 milliGRAM(s) Oral daily  budesonide  80 MICROgram(s)/formoterol 4.5 MICROgram(s) Inhaler 2 Puff(s) Inhalation two times a day  folic acid 1 milliGRAM(s) Oral daily  gabapentin 400 milliGRAM(s) Oral two times a day  influenza  Vaccine (HIGH DOSE) 0.7 milliLiter(s) IntraMuscular once  lactated ringers. 1000 milliLiter(s) (50 mL/Hr) IV Continuous <Continuous>  midodrine. 10 milliGRAM(s) Oral three times a day  montelukast 10 milliGRAM(s) Oral at bedtime  pantoprazole    Tablet 40 milliGRAM(s) Oral before breakfast  predniSONE   Tablet 10 milliGRAM(s) Oral daily  senna 2 Tablet(s) Oral at bedtime  simvastatin 10 milliGRAM(s) Oral at bedtime  tiotropium 2.5 MICROgram(s) Inhaler 2 Puff(s) Inhalation daily    MEDICATIONS  (PRN):  acetaminophen     Tablet .. 650 milliGRAM(s) Oral every 6 hours PRN Mild Pain (1 - 3), Moderate Pain (4 - 6)  morphine  - Injectable 2 milliGRAM(s) IV Push every 6 hours PRN breakthrough pain  oxyCODONE    IR 5 milliGRAM(s) Oral every 6 hours PRN Severe Pain (7 - 10)

## 2023-09-15 NOTE — PROGRESS NOTE ADULT - ASSESSMENT
incomplete   83 y/o female with afib was on coumadin but currently on lovenox, aplastic anemia, copd, PMR, htn sent in by preop testing (kyphoplasty) for low bp and low hemoglobin. Pt states she went to see her oncologist , had blood work done showing hgb 7.9 and was told to come to the ED for blood transfusion.    #Macrocytic anemia   -patient has hx of chronic anemia stated she is seen by Dr. Hari Parker [phone] for aplastic anemia, myelodysplasia and IgG subclass def. being tx w. weekly procrit inj and, IVIG transfusion 1x/month  -as per patient no BM BX ever done-declined to get one this hosp stay r/t increased back pain.   -patient baseline hgb around 9  -on 9/7 hgb 9.1 when presented to ER (9/14) hgb 6.9  -no large ecchymotic areas noted, but tenderness and edema noted in R upper thigh/groin area   -manual smear noted for smudge cells, and basophilic stippling- no note of schistocytes    -US RLE(8/19) showing No evidence of right lower extremity deep venous thrombosis, Large right thigh hematoma.  -CT A/p and CT RLE- showing Enlargement is seen within the adductor muscles roughly spanning 18.2 cm craniocaudad beginning at the level of the pubic symphysis and extending distally with medial femur, Additional enlargement of the vastus medialis spanning roughly 26 cm craniocaudad beginning at the level of the pubic symphysis and extending distally throughout the anterior to medial femur.   -as per patient sustained a fall aug 2023 where she injured her L-spine (was on coumadin at the time) because of severe back pain patient had planned for elective kyphoplasty, stated she has since not fallen or had any traumas since event. To note patient transitioned from coumadin to LMWH in preparation for procedure on Monday 9/11.  -patient hemodynamically stable, INR normalized, hgb close to patients baseline after 2U PRBC. OK for patient to proceed to OR from hematology standpoint w/ close mon of H/H pre and post-op.  -Tiron-242, TIBC/%sat-unable to quant., ferritin-1342  -Hemolysis (-),willy (-),  , Fol-16.2, TSH-nl  -pending FOBT-up to date on colon screenings   -pending further anemia w/u   -maintain hgb >7 or if symptomatic   -when patient stabilized for d/c will need to f/u w/ Hematologist Dr. Parker.       Will continue to monitor the patient.  Please call with any questions [phone]  Above reviewed with Attending Dr. Garcia  [facility]  [address] [facility], [address], [place], [place]  [phone]  *Note not finalized until signed by Attending Physician    83 y/o female with afib was on coumadin but currently on lovenox, aplastic anemia, copd, PMR, htn sent in by preop testing (kyphoplasty) for low bp and low hemoglobin. Pt states she went to see her oncologist , had blood work done showing hgb 7.9 and was told to come to the ED for blood transfusion.    #Macrocytic anemia   -patient has hx of chronic anemia stated she is seen by Dr. Hari Parker [phone] for aplastic anemia, myelodysplasia and IgG subclass def. being tx w. weekly procrit inj and, IVIG transfusion 1x/month  -as per patient no BM BX ever done-declined to get one this hosp stay r/t increased back pain.   -patient baseline hgb around 9  -on 9/7 hgb 9.1 when presented to ER (9/14) hgb 6.9  -no large ecchymotic areas noted, but tenderness and edema noted in R upper thigh/groin area   -manual smear noted for smudge cells, and basophilic stippling- no note of schistocytes    -US RLE(8/19) showing No evidence of right lower extremity deep venous thrombosis, Large right thigh hematoma.  -CT A/p and CT RLE- showing Enlargement is seen within the adductor muscles roughly spanning 18.2 cm craniocaudad beginning at the level of the pubic symphysis and extending distally with medial femur, Additional enlargement of the vastus medialis spanning roughly 26 cm craniocaudad beginning at the level of the pubic symphysis and extending distally throughout the anterior to medial femur.   -as per patient sustained a fall aug 2023 where she injured her L-spine (was on coumadin at the time) because of severe back pain patient had planned for elective kyphoplasty, stated she has since not fallen or had any traumas since event. To note patient transitioned from coumadin to LMWH in preparation for procedure on Monday 9/11.  -patient hemodynamically stable, INR normalized, hgb close to patients baseline after 2U PRBC. OK for patient to proceed to OR from hematology standpoint w/ close mon of H/H pre and post-op (discussed w/ Dr. Garcia)  -Tiron-242, TIBC/%sat-unable to quant., ferritin-1342  -Hemolysis (-),willy (-),  , Fol-16.2, TSH-nl  -pending FOBT-up to date on colon screenings   -pending further anemia w/u   -maintain hgb >7 or if symptomatic   -when patient stabilized for d/c will need to f/u w/ Hematologist Dr. Parker.       Will continue to monitor the patient.  Please call with any questions [phone]  Above reviewed with Attending Dr. Jose [facility]  [address] [facility], [address], [place], [place]  [phone]  *Note not finalized until signed by Attending Physician

## 2023-09-15 NOTE — DIETITIAN INITIAL EVALUATION ADULT - NS FNS DIET ORDER
Diet, Regular (09-15-23 @ 12:07)  Diet, NPO after Midnight:      NPO Start Date: 15-Sep-2023,   NPO Start Time: 23:59  Except Medications (09-15-23 @ 10:02)

## 2023-09-15 NOTE — DIETITIAN INITIAL EVALUATION ADULT - PERTINENT LABORATORY DATA
09-15    138  |  106  |  45<H>  ----------------------------<  106<H>  5.0   |  25  |  1.55<H>    Ca    8.8      15 Sep 2023 06:02    TPro  6.1  /  Alb  x   /  TBili  x   /  DBili  x   /  AST  x   /  ALT  x   /  AlkPhos  x   09-14  A1C with Estimated Average Glucose Result: 5.2 % (10-06-22 @ 07:30)

## 2023-09-15 NOTE — PROGRESS NOTE ADULT - SUBJECTIVE AND OBJECTIVE BOX
Patient was seen and examined at bedside. Patient has no acute complaints overnight. Patient was given 2U yesterday. Patient continues to endorse having pain right groin area, consistent and similar in pain for the past couple of days. No new numbness or tingling, CP or SOB.     Vital Signs (24 Hrs):  T(C): 36.8 (09-15-23 @ 04:59), Max: 37.2 (09-14-23 @ 20:30)  HR: 81 (09-15-23 @ 04:59) (78 - 90)  BP: 119/77 (09-15-23 @ 04:59) (92/53 - 119/77)  RR: 18 (09-15-23 @ 04:59) (18 - 18)  SpO2: 95% (09-15-23 @ 04:59) (95% - 98%)  Wt(kg): --    LABS:                          8.6    6.93  )-----------( 251      ( 15 Sep 2023 00:31 )             26.4     09-14    137  |  108  |  30<H>  ----------------------------<  118<H>  4.3   |  23  |  1.35<H>    Ca    9.1      14 Sep 2023 11:30    TPro  6.1  /  Alb  x   /  TBili  x   /  DBili  x   /  AST  x   /  ALT  x   /  AlkPhos  x   09-14    LIVER FUNCTIONS - ( 14 Sep 2023 14:00 )  Alb: x     / Pro: 6.1 g/dL / ALK PHOS: x     / ALT: x     / AST: x     / GGT: x           PT/INR - ( 14 Sep 2023 11:30 )   PT: 14.3 sec;   INR: 1.21 ratio         PTT - ( 14 Sep 2023 11:30 )  PTT:34.6 sec      Physical Exam:    General: NAD, lying comfortably in bed    Spine:  PHYSICAL EXAM:  General; Awake and alert, Oriented x 3  Spine: No TTP over spinous processes or paraspinal muscles at C/T/L spine. No palpable step off.     Able to actively SLR on left side, limited by pain on right side    Motor exam:        C5       C6       C7       C8       T1   R  4/5      5/5     5/5     3/5      3/5  L   4/5      5/5     5/5     3/5      3/5         L2        L3       L4       L5      S1  R  3/5      3/5     3/5     5/5    5/5  L   5/5     5/5      5/5     5/5    5/5    Sensory:  (0=absent, 1=impaired, 2=normal, NT=not testable)      C5    C6   C7   C8   T1         R   2     2      2     2      2  L    2     2      2     2      2        L2    L3   L4   L5   S1   R   2     2     2     2     2  L    2     2     2     2     2    Right Upper extremity:   Negative Lim  +Rad Pulse  Compartments soft and compressible    Left Upper extremity:   Negative Lim  +Rad Pulse  Compartments soft and compressible    Right Lower Extremity:   SILT L2-S1  Skin intact, No erythema  Mild swelling to right upper thigh  Mild TTP to right hip/groin area  Negative Clonus  Negative Babinski  +DP  Compartments soft and compressible           Left Lower Extremity:  SILT L2-S1  Negative Clonus   Negative Babinski  +DP  Compartments soft and compressible    Plan:  - Plan for OR today or tomorrow with Dr. Chambers for elective kyphoplasty  - FU Medical and heme/onc optimization prior to surgery  - Discuss with Medicine regarding stress dose steroids  - FU AM Labs  - WBAT  - PT OT daily  - Pain Analgesia PRN  - C/w home meds  - DVT PPX: hold for OR  - Plan to be discussed with Dr. Chambers and to be adjusted accordingly

## 2023-09-15 NOTE — PROGRESS NOTE ADULT - ASSESSMENT
82 years old female with h/o Afib, PMR, aplastic anemia, HLD, orthostatic hypotension on midodrine present to ED with complain of low Hb from preop. Patient reported generalized weakness and lightheadedness. Denied any chest pain or SOB. No vomiting of blood or rectal bleed. Patient reported stool slightly dark as she took pepto bismol recently. Last transfuion was around 04/2023. Currently coumadin on hold and on lovenox. Patient does noted slight right thigh pain and swelling for last day or so.   Hemodynamically stable, afebrile, sat well at RA. No leukocytosis, Hb 6.9 ( 9.1 9/7/23), plt 289, K 4.3, Cr 1.35      Acute blood loss anemia/ H/O aplastic anemia:  - Symptomatic on admission   - Bbrought in from preop for low Hb, dizziness, generalized weakness  - Last transfusion on 04/2023, following with outpatient hematology, getting procrit, IVIG  - Right thigh hematoma noted in CT   - Hb 6.9 ( 9.1 9/7/23), plt 289 on admission, S/P 1 unit of PRBC yesterday   right thigh pain---> CT RLE to R/O hematoma given patient on AC and Hb drop from 9--> 7 within short period of time. Also get CT abd/pelvis  Check FOBT  Hematology consulted  Monitor H/H, transfuse as needed to keep Hb> 7 or if patient is symptomatic  Hold lovenox for now until Hb stable and bleeding is ruled out.     Problem/Recommendation - 2:  ·  Problem: PMR (polymyalgia rheumatica).   ·  Recommendation: h/o PMR on steroid since around 05/2023, gradually tapering down  Currently on prednisone 10mg daily  Continue current prednisone dose  If any surgical intervention is planned, give stress dose of steroid, i.e,  50mg IV hydrocortisone prior to OR, then 25mg q8hr for 1 day, then resume home dose thereafter.     Problem/Recommendation - 3:  ·  Problem: H/O orthostatic hypotension.   ·  Recommendation: continue midodrine 10mg tid.     Problem/Recommendation - 4:  ·  Problem: Paroxysmal atrial fibrillation.   ·  Recommendation: s/p ablation, following with outpatient cardiology, last seen 9/7/23 ( terry bustamante). per note, had a Ziopatch in 2/2021 that showed symptomatic PACs and PAT episodes. There was no AF seen.  Was on coumadin being held with plan for OR, on lovenox. Will hold for now given anemia  If bleeding is ruled out and Hb remain stable, will resume lovenox.     Problem/Recommendation - 5:  ·  Problem: Hyperlipidemia, unspecified.   ·  Recommendation: on statin.     Problem/Recommendation - 6:  ·  Problem: Aplastic anemia.   ·  Recommendation: Hematology consulted.     Problem/Recommendation - 7:  ·  Problem: Low back pain.   ·  Recommendation: Being planned for kyphoplasty by ortho team  medical risk assessment/optimization pending further work up and clinical course  pain control--> oxy prn for severe pain, IV morphine for breakthrough pain  continue gabapentin.     82 years old female with h/o Afib, PMR, aplastic anemia, HLD, orthostatic hypotension on midodrine present to ED with complain of low Hb from preop. Patient reported generalized weakness and lightheadedness. Denied any chest pain or SOB. No vomiting of blood or rectal bleed. Patient reported stool slightly dark as she took pepto bismol recently. Last transfuion was around 04/2023. Currently coumadin on hold and on lovenox. Patient does noted slight right thigh pain and swelling for last day or so.   Hemodynamically stable, afebrile, sat well at RA. No leukocytosis, Hb 6.9 ( 9.1 9/7/23), plt 289, K 4.3, Cr 1.35    Acute blood loss anemia/ H/O aplastic anemia:  - Symptomatic on admission   - Brought in from preop for low Hb, dizziness, generalized weakness  - Last transfusion on 04/2023, following with outpatient hematology, getting procrit, IVIG  - Large right thigh hematoma noted in CT   - Hb 6.9 ( 9.1 9/7/23), plt 289 on admission, S/P 1 unit of PRBC yesterday   - Hematology consult appreciated   - Monitor H/H, transfuse as needed to keep Hb> 7 or if patient is symptomatic  - Hold lovenox for now until Hb stable and bleeding is ruled out.  - Ortho / gen sx follow up regarding hematoma    PMR (polymyalgia rheumatica):  - H/O PMR on steroid since around 05/2023, gradually tapering down  - Currently on prednisone 10mg daily  - Continue current prednisone dose  - Stress dose of steroid, i.e,  50mg IV hydrocortisone prior to OR ( can give one hour prior OR),  then 25mg q8hr for 1 day, then resume home dose thereafter.    Low back pain:  - Being planned for kyphoplasty by ortho team  - Pain control--> oxy prn for severe pain, IV morphine for breakthrough pain  - Continue gabapentin.  - No medical contraindication for planned procedure     Paroxysmal atrial fibrillation:  - S/P ablation, following with outpatient cardiology, last seen 9/7/23 ( terry ROSENBERG note). per note, had a Ziopatch in 2/2021 that showed symptomatic PACs and PAT episodes. There was no AF seen.  - Was on coumadin being held with plan for OR, on Lovenox Will hold for now given anemia  - If bleeding is ruled out and Hb remain stable, will resume Lovenox    Low back pain:  - Being planned for kyphoplasty by ortho team  - Pain control--> oxy prn for severe pain, IV morphine for breakthrough pain  - Continue gabapentin.  - No medical contraindication for planned procedure     KIMBERLY:  - Cr mildly worse than baseline  - Can obtain renal US  - Trial of IVF    Hyperlipidemia:  - On statin.

## 2023-09-16 ENCOUNTER — APPOINTMENT (OUTPATIENT)
Dept: ORTHOPEDIC SURGERY | Facility: HOSPITAL | Age: 83
End: 2023-09-16
Payer: MEDICARE

## 2023-09-16 ENCOUNTER — TRANSCRIPTION ENCOUNTER (OUTPATIENT)
Age: 83
End: 2023-09-16

## 2023-09-16 LAB
ANION GAP SERPL CALC-SCNC: 5 MMOL/L — SIGNIFICANT CHANGE UP (ref 5–17)
ANION GAP SERPL CALC-SCNC: 6 MMOL/L — SIGNIFICANT CHANGE UP (ref 5–17)
APTT BLD: 28.2 SEC — SIGNIFICANT CHANGE UP (ref 24.5–35.6)
BUN SERPL-MCNC: 49 MG/DL — HIGH (ref 7–23)
BUN SERPL-MCNC: 53 MG/DL — HIGH (ref 7–23)
CALCIUM SERPL-MCNC: 8.5 MG/DL — SIGNIFICANT CHANGE UP (ref 8.5–10.1)
CALCIUM SERPL-MCNC: 9 MG/DL — SIGNIFICANT CHANGE UP (ref 8.5–10.1)
CHLORIDE SERPL-SCNC: 107 MMOL/L — SIGNIFICANT CHANGE UP (ref 96–108)
CHLORIDE SERPL-SCNC: 110 MMOL/L — HIGH (ref 96–108)
CO2 SERPL-SCNC: 23 MMOL/L — SIGNIFICANT CHANGE UP (ref 22–31)
CO2 SERPL-SCNC: 25 MMOL/L — SIGNIFICANT CHANGE UP (ref 22–31)
CREAT SERPL-MCNC: 1.39 MG/DL — HIGH (ref 0.5–1.3)
CREAT SERPL-MCNC: 1.53 MG/DL — HIGH (ref 0.5–1.3)
EGFR: 34 ML/MIN/1.73M2 — LOW
EGFR: 38 ML/MIN/1.73M2 — LOW
GLUCOSE BLDC GLUCOMTR-MCNC: 126 MG/DL — HIGH (ref 70–99)
GLUCOSE BLDC GLUCOMTR-MCNC: 156 MG/DL — HIGH (ref 70–99)
GLUCOSE SERPL-MCNC: 134 MG/DL — HIGH (ref 70–99)
GLUCOSE SERPL-MCNC: 142 MG/DL — HIGH (ref 70–99)
HCT VFR BLD CALC: 25.9 % — LOW (ref 34.5–45)
HGB BLD-MCNC: 8.7 G/DL — LOW (ref 11.5–15.5)
INR BLD: 1.05 RATIO — SIGNIFICANT CHANGE UP (ref 0.85–1.18)
MCHC RBC-ENTMCNC: 30.3 PG — SIGNIFICANT CHANGE UP (ref 27–34)
MCHC RBC-ENTMCNC: 33.6 G/DL — SIGNIFICANT CHANGE UP (ref 32–36)
MCV RBC AUTO: 90.2 FL — SIGNIFICANT CHANGE UP (ref 80–100)
NRBC # BLD: 2 /100 WBCS — HIGH (ref 0–0)
PLATELET # BLD AUTO: 205 K/UL — SIGNIFICANT CHANGE UP (ref 150–400)
POTASSIUM SERPL-MCNC: 5.5 MMOL/L — HIGH (ref 3.5–5.3)
POTASSIUM SERPL-MCNC: 5.7 MMOL/L — HIGH (ref 3.5–5.3)
POTASSIUM SERPL-SCNC: 5.5 MMOL/L — HIGH (ref 3.5–5.3)
POTASSIUM SERPL-SCNC: 5.7 MMOL/L — HIGH (ref 3.5–5.3)
PROTHROM AB SERPL-ACNC: 12.5 SEC — SIGNIFICANT CHANGE UP (ref 9.5–13)
RBC # BLD: 2.87 M/UL — LOW (ref 3.8–5.2)
RBC # FLD: 23.6 % — HIGH (ref 10.3–14.5)
SODIUM SERPL-SCNC: 137 MMOL/L — SIGNIFICANT CHANGE UP (ref 135–145)
SODIUM SERPL-SCNC: 139 MMOL/L — SIGNIFICANT CHANGE UP (ref 135–145)
WBC # BLD: 8.37 K/UL — SIGNIFICANT CHANGE UP (ref 3.8–10.5)
WBC # FLD AUTO: 8.37 K/UL — SIGNIFICANT CHANGE UP (ref 3.8–10.5)

## 2023-09-16 PROCEDURE — 99232 SBSQ HOSP IP/OBS MODERATE 35: CPT

## 2023-09-16 PROCEDURE — 22514 PERQ VERTEBRAL AUGMENTATION: CPT

## 2023-09-16 DEVICE — CEMENT BONE RADIOPAQUE W/VERTAPLEX: Type: IMPLANTABLE DEVICE | Status: FUNCTIONAL

## 2023-09-16 DEVICE — STRYKER KIT OMNICURVE FRACTURE 11G 20MM: Type: IMPLANTABLE DEVICE | Status: FUNCTIONAL

## 2023-09-16 RX ORDER — HYDROCORTISONE 20 MG
50 TABLET ORAL ONCE
Refills: 0 | Status: COMPLETED | OUTPATIENT
Start: 2023-09-16 | End: 2023-09-16

## 2023-09-16 RX ORDER — HYDROMORPHONE HYDROCHLORIDE 2 MG/ML
0.5 INJECTION INTRAMUSCULAR; INTRAVENOUS; SUBCUTANEOUS
Refills: 0 | Status: DISCONTINUED | OUTPATIENT
Start: 2023-09-16 | End: 2023-09-16

## 2023-09-16 RX ORDER — DEXTROSE 50 % IN WATER 50 %
50 SYRINGE (ML) INTRAVENOUS ONCE
Refills: 0 | Status: COMPLETED | OUTPATIENT
Start: 2023-09-16 | End: 2023-09-16

## 2023-09-16 RX ORDER — SODIUM CHLORIDE 9 MG/ML
1000 INJECTION, SOLUTION INTRAVENOUS
Refills: 0 | Status: DISCONTINUED | OUTPATIENT
Start: 2023-09-16 | End: 2023-09-16

## 2023-09-16 RX ORDER — INSULIN HUMAN 100 [IU]/ML
10 INJECTION, SOLUTION SUBCUTANEOUS ONCE
Refills: 0 | Status: COMPLETED | OUTPATIENT
Start: 2023-09-16 | End: 2023-09-16

## 2023-09-16 RX ORDER — SODIUM CHLORIDE 9 MG/ML
1000 INJECTION INTRAMUSCULAR; INTRAVENOUS; SUBCUTANEOUS
Refills: 0 | Status: DISCONTINUED | OUTPATIENT
Start: 2023-09-16 | End: 2023-09-20

## 2023-09-16 RX ORDER — HYDROMORPHONE HYDROCHLORIDE 2 MG/ML
1 INJECTION INTRAMUSCULAR; INTRAVENOUS; SUBCUTANEOUS
Refills: 0 | Status: DISCONTINUED | OUTPATIENT
Start: 2023-09-16 | End: 2023-09-16

## 2023-09-16 RX ORDER — SODIUM ZIRCONIUM CYCLOSILICATE 10 G/10G
10 POWDER, FOR SUSPENSION ORAL ONCE
Refills: 0 | Status: COMPLETED | OUTPATIENT
Start: 2023-09-16 | End: 2023-09-16

## 2023-09-16 RX ORDER — HYDROCORTISONE 20 MG
25 TABLET ORAL EVERY 8 HOURS
Refills: 0 | Status: COMPLETED | OUTPATIENT
Start: 2023-09-16 | End: 2023-09-17

## 2023-09-16 RX ADMIN — Medication 10 MILLIGRAM(S): at 06:17

## 2023-09-16 RX ADMIN — BUDESONIDE AND FORMOTEROL FUMARATE DIHYDRATE 2 PUFF(S): 160; 4.5 AEROSOL RESPIRATORY (INHALATION) at 17:32

## 2023-09-16 RX ADMIN — SODIUM ZIRCONIUM CYCLOSILICATE 10 GRAM(S): 10 POWDER, FOR SUSPENSION ORAL at 14:00

## 2023-09-16 RX ADMIN — Medication 50 MILLILITER(S): at 14:00

## 2023-09-16 RX ADMIN — Medication 25 MILLIGRAM(S): at 22:35

## 2023-09-16 RX ADMIN — MORPHINE SULFATE 2 MILLIGRAM(S): 50 CAPSULE, EXTENDED RELEASE ORAL at 09:08

## 2023-09-16 RX ADMIN — GABAPENTIN 400 MILLIGRAM(S): 400 CAPSULE ORAL at 17:32

## 2023-09-16 RX ADMIN — SODIUM CHLORIDE 75 MILLILITER(S): 9 INJECTION, SOLUTION INTRAVENOUS at 19:30

## 2023-09-16 RX ADMIN — Medication 1 MILLIGRAM(S): at 12:21

## 2023-09-16 RX ADMIN — TIOTROPIUM BROMIDE 2 PUFF(S): 18 CAPSULE ORAL; RESPIRATORY (INHALATION) at 12:21

## 2023-09-16 RX ADMIN — GABAPENTIN 400 MILLIGRAM(S): 400 CAPSULE ORAL at 06:16

## 2023-09-16 RX ADMIN — MORPHINE SULFATE 2 MILLIGRAM(S): 50 CAPSULE, EXTENDED RELEASE ORAL at 02:34

## 2023-09-16 RX ADMIN — BUDESONIDE AND FORMOTEROL FUMARATE DIHYDRATE 2 PUFF(S): 160; 4.5 AEROSOL RESPIRATORY (INHALATION) at 06:18

## 2023-09-16 RX ADMIN — INSULIN HUMAN 10 UNIT(S): 100 INJECTION, SOLUTION SUBCUTANEOUS at 13:59

## 2023-09-16 RX ADMIN — SENNA PLUS 2 TABLET(S): 8.6 TABLET ORAL at 22:35

## 2023-09-16 RX ADMIN — MONTELUKAST 10 MILLIGRAM(S): 4 TABLET, CHEWABLE ORAL at 22:35

## 2023-09-16 RX ADMIN — SODIUM CHLORIDE 100 MILLILITER(S): 9 INJECTION INTRAMUSCULAR; INTRAVENOUS; SUBCUTANEOUS at 08:47

## 2023-09-16 RX ADMIN — Medication 50 MILLIGRAM(S): at 14:33

## 2023-09-16 RX ADMIN — MORPHINE SULFATE 2 MILLIGRAM(S): 50 CAPSULE, EXTENDED RELEASE ORAL at 08:53

## 2023-09-16 RX ADMIN — PANTOPRAZOLE SODIUM 40 MILLIGRAM(S): 20 TABLET, DELAYED RELEASE ORAL at 08:20

## 2023-09-16 RX ADMIN — SIMVASTATIN 10 MILLIGRAM(S): 20 TABLET, FILM COATED ORAL at 22:35

## 2023-09-16 RX ADMIN — MIDODRINE HYDROCHLORIDE 10 MILLIGRAM(S): 2.5 TABLET ORAL at 12:22

## 2023-09-16 RX ADMIN — MIDODRINE HYDROCHLORIDE 10 MILLIGRAM(S): 2.5 TABLET ORAL at 17:32

## 2023-09-16 RX ADMIN — MORPHINE SULFATE 2 MILLIGRAM(S): 50 CAPSULE, EXTENDED RELEASE ORAL at 02:49

## 2023-09-16 RX ADMIN — AMIODARONE HYDROCHLORIDE 100 MILLIGRAM(S): 400 TABLET ORAL at 06:14

## 2023-09-16 RX ADMIN — SODIUM CHLORIDE 75 MILLILITER(S): 9 INJECTION, SOLUTION INTRAVENOUS at 06:19

## 2023-09-16 RX ADMIN — SODIUM CHLORIDE 100 MILLILITER(S): 9 INJECTION INTRAMUSCULAR; INTRAVENOUS; SUBCUTANEOUS at 22:37

## 2023-09-16 NOTE — PROGRESS NOTE ADULT - SUBJECTIVE AND OBJECTIVE BOX
Patient was seen and examined at bedside. Patient has no acute complaints overnight. Patient was given 1U yesterday. Patient continues to endorse having pain right groin area, consistent and similar in pain for the past couple of days. No new numbness or tingling, CP or SOB.     Vital Signs (24 Hrs):  T(C): 37 (09-16-23 @ 05:02), Max: 37 (09-16-23 @ 05:02)  HR: 83 (09-16-23 @ 05:02) (76 - 85)  BP: 131/76 (09-16-23 @ 05:02) (105/61 - 131/76)  RR: 18 (09-16-23 @ 05:02) (18 - 18)  SpO2: 96% (09-16-23 @ 05:02) (94% - 96%)  Wt(kg): --    LABS:                          9.1    7.96  )-----------( 245      ( 15 Sep 2023 20:58 )             26.5     09-15    138  |  106  |  45<H>  ----------------------------<  106<H>  5.0   |  25  |  1.55<H>    Ca    8.8      15 Sep 2023 06:02    TPro  6.1  /  Alb  x   /  TBili  x   /  DBili  x   /  AST  x   /  ALT  x   /  AlkPhos  x   09-14    LIVER FUNCTIONS - ( 14 Sep 2023 14:00 )  Alb: x     / Pro: 6.1 g/dL / ALK PHOS: x     / ALT: x     / AST: x     / GGT: x           PT/INR - ( 15 Sep 2023 06:02 )   PT: 12.7 sec;   INR: 1.06 ratio         PTT - ( 15 Sep 2023 06:02 )  PTT:28.5 sec    Physical Exam:    General: NAD, lying comfortably in bed    Spine:  PHYSICAL EXAM:  General; Awake and alert, Oriented x 3  Spine: No TTP over spinous processes or paraspinal muscles at C/T/L spine. No palpable step off.     Able to actively SLR on left side, limited by pain on right side    Motor exam:        C5       C6       C7       C8       T1   R  4/5      5/5     5/5     3/5      3/5  L   4/5      5/5     5/5     3/5      3/5         L2        L3       L4       L5      S1  R  3/5      3/5     3/5     5/5    5/5  L   5/5     5/5      5/5     5/5    5/5    Sensory:  (0=absent, 1=impaired, 2=normal, NT=not testable)      C5    C6   C7   C8   T1         R   2     2      2     2      2  L    2     2      2     2      2        L2    L3   L4   L5   S1   R   2     2     2     2     2  L    2     2     2     2     2    Right Upper extremity:   Negative Lim  +Rad Pulse  Compartments soft and compressible    Left Upper extremity:   Negative Lim  +Rad Pulse  Compartments soft and compressible    Right Lower Extremity:   SILT L2-S1  Skin intact, No erythema  Mild swelling to right upper thigh  Mild TTP to right hip/groin area  Negative Clonus  Negative Babinski  +DP  Compartments soft and compressible           Left Lower Extremity:  SILT L2-S1  Negative Clonus   Negative Babinski  +DP  Compartments soft and compressible    Plan:  - Plan for OR today with Dr. Chambers for elective kyphoplasty  - FU Medical and heme/onc optimization: Optimized  - Stress dose steroids to be given 1 hour before OR per Medicine instructions  - FU AM Labs  - WBAT  - PT OT daily  - Pain Analgesia PRN  - C/w home meds  - DVT PPX: hold for OR  - Plan to be discussed with Dr. Chambers and to be adjusted accordingly

## 2023-09-16 NOTE — PROGRESS NOTE ADULT - NSPROGADDITIONALINFOA_GEN_ALL_CORE
Patient was initially planned to have kypho couple of days ago but was hypotensive in the holding area so was admitted to the hospital instead - now she has received blood and medical clearance so we will proceed with the kypho as originally planned   r/b/e the procedure discussed and patient well informed would like to proceed

## 2023-09-16 NOTE — PROGRESS NOTE ADULT - ASSESSMENT
82 years old female with h/o Afib, PMR, aplastic anemia, HLD, orthostatic hypotension on midodrine present to ED with complain of low Hb from preop. Patient reported generalized weakness and lightheadedness. Denied any chest pain or SOB. No vomiting of blood or rectal bleed. Patient reported stool slightly dark as she took pepto bismol recently. Last transfuion was around 04/2023. Currently coumadin on hold and on lovenox. Patient does noted slight right thigh pain and swelling for last day or so.   Hemodynamically stable, afebrile, sat well at RA. No leukocytosis, Hb 6.9 ( 9.1 9/7/23), plt 289, K 4.3, Cr 1.35    Acute blood loss anemia/ H/O aplastic anemia:  - Symptomatic on admission   - Brought in from preop for low Hb, dizziness, generalized weakness  - Last transfusion on 04/2023, following with outpatient hematology, getting procrit, IVIG  - Large right thigh hematoma noted in CT   - Hb 6.9 ( 9.1 9/7/23), plt 289 on admission, S/P 1 unit of PRBC yesterday   - Hematology consult appreciated   - Monitor H/H, transfuse as needed to keep Hb> 7 or if patient is symptomatic  - Hold lovenox for now until Hb stable and bleeding is ruled out.  - Ortho / gen sx follow up regarding hematoma    PMR (polymyalgia rheumatica):  - H/O PMR on steroid since around 05/2023, gradually tapering down  - Currently on prednisone 10mg daily  - Continue current prednisone dose  - Stress dose of steroid, i.e,  50mg IV hydrocortisone prior to OR ( can give one hour prior OR),  then 25mg q8hr for 1 day, then resume home dose thereafter.    Low back pain:  -Plan for kyphoplasty by ortho team  - Pain control--> oxy prn for severe pain, IV morphine for breakthrough pain  - Continue gabapentin.  - No medical contraindication for planned procedure     Paroxysmal atrial fibrillation:  - S/P ablation, following with outpatient cardiology, last seen 9/7/23 ( terry ROSENBERG note). per note, had a Ziopatch in 2/2021 that showed symptomatic PACs and PAT episodes. There was no AF seen.  - Was on coumadin being held with plan for OR, on Lovenox Will hold for now given anemia  - If bleeding is ruled out and Hb remain stable, will resume Lovenox    Low back pain:  - Being planned for kyphoplasty by ortho team  - Pain control--> oxy prn for severe pain, IV morphine for breakthrough pain  - Continue gabapentin.  - No medical contraindication for planned procedure     KIMBERLY:  - Cr mildly worse than baseline  - Can obtain renal US  - Trial of IVF    Hyperlipidemia:  - On statin. 82 years old female with h/o Afib, PMR, aplastic anemia, HLD, orthostatic hypotension on midodrine present to ED with complain of low Hb from preop. Patient reported generalized weakness and lightheadedness. Denied any chest pain or SOB. No vomiting of blood or rectal bleed. Patient reported stool slightly dark as she took pepto bismol recently. Last transfuion was around 04/2023. Currently coumadin on hold and on lovenox. Patient does noted slight right thigh pain and swelling for last day or so.   Hemodynamically stable, afebrile, sat well at RA. No leukocytosis, Hb 6.9 ( 9.1 9/7/23), plt 289, K 4.3, Cr 1.35    Acute blood loss anemia/ H/O aplastic anemia:  - Symptomatic on admission   - Brought in from preop for low Hb, dizziness, generalized weakness  - Last transfusion on 04/2023, following with outpatient hematology, getting procrit, IVIG  - Large right thigh hematoma noted in CT   - Hb 6.9 ( 9.1 9/7/23), plt 289 on admission, S/P 1 unit of PRBC yesterday   - Hematology consult appreciated   - Monitor H/H, transfuse as needed to keep Hb> 7 or if patient is symptomatic  - Hold lovenox for now until Hb stable and bleeding is ruled out.  - Ortho / gen sx follow up regarding hematoma    PMR (polymyalgia rheumatica):  - H/O PMR on steroid since around 05/2023, gradually tapering down  - Currently on prednisone 10mg daily  - Continue current prednisone dose  - Stress dose of steroid, i.e,  50mg IV hydrocortisone prior to OR ( can give one hour prior OR),  then 25mg q8hr for 1 day, then resume home dose thereafter.    Low back pain:  -Plan for kyphoplasty by ortho team  - Pain control--> oxy prn for severe pain, IV morphine for breakthrough pain  - Continue gabapentin.  - No medical contraindication for planned procedure     Paroxysmal atrial fibrillation:  - S/P ablation, following with outpatient cardiology, last seen 9/7/23 ( terry ROSENBERG note). per note, had a Ziopatch in 2/2021 that showed symptomatic PACs and PAT episodes. There was no AF seen.  - Was on coumadin being held with plan for OR, on Lovenox Will hold for now given anemia  - If bleeding is ruled out and Hb remain stable, will resume Lovenox    Low back pain:  - Being planned for kyphoplasty by ortho team  - Pain control--> oxy prn for severe pain, IV morphine for breakthrough pain  - Continue gabapentin.  - No medical contraindication for planned procedure     KIMBERLY with hyperkalemia  - Cr improved today  - US with no hydro  - IVF  - Insulin/ D50 and Lokelma for hyperK, low K diet     Hyperlipidemia:  - On statin.

## 2023-09-16 NOTE — PROGRESS NOTE ADULT - SUBJECTIVE AND OBJECTIVE BOX
Patient is a 82y old  Female who presents with a chief complaint of Abnormal lab values - preoperative (16 Sep 2023 07:13)      INTERVAL HPI/OVERNIGHT EVENTS:  Pt was seen and examined, no acute events.      MEDICATIONS  (STANDING):  aMIOdarone    Tablet 100 milliGRAM(s) Oral daily  budesonide  80 MICROgram(s)/formoterol 4.5 MICROgram(s) Inhaler 2 Puff(s) Inhalation two times a day  folic acid 1 milliGRAM(s) Oral daily  gabapentin 400 milliGRAM(s) Oral two times a day  influenza  Vaccine (HIGH DOSE) 0.7 milliLiter(s) IntraMuscular once  midodrine. 10 milliGRAM(s) Oral three times a day  montelukast 10 milliGRAM(s) Oral at bedtime  pantoprazole    Tablet 40 milliGRAM(s) Oral before breakfast  predniSONE   Tablet 10 milliGRAM(s) Oral daily  senna 2 Tablet(s) Oral at bedtime  simvastatin 10 milliGRAM(s) Oral at bedtime  sodium chloride 0.9%. 1000 milliLiter(s) (100 mL/Hr) IV Continuous <Continuous>  tiotropium 2.5 MICROgram(s) Inhaler 2 Puff(s) Inhalation daily    MEDICATIONS  (PRN):  acetaminophen     Tablet .. 650 milliGRAM(s) Oral every 6 hours PRN Mild Pain (1 - 3), Moderate Pain (4 - 6)  oxyCODONE    IR 5 milliGRAM(s) Oral every 6 hours PRN Severe Pain (7 - 10)      Allergies  No Known Allergies        Vital Signs Last 24 Hrs  T(C): 37.1 (16 Sep 2023 11:42), Max: 37.1 (16 Sep 2023 11:42)  T(F): 98.7 (16 Sep 2023 11:42), Max: 98.7 (16 Sep 2023 11:42)  HR: 79 (16 Sep 2023 11:42) (79 - 85)  BP: 109/68 (16 Sep 2023 11:42) (109/68 - 131/76)  BP(mean): --  RR: 18 (16 Sep 2023 11:42) (18 - 18)  SpO2: 95% (16 Sep 2023 11:42) (95% - 96%)    Parameters below as of 16 Sep 2023 11:42  Patient On (Oxygen Delivery Method): room air        PHYSICAL EXAM:  GENERAL: NAD  HEAD:  Atraumatic  EYES: PERRLA  ENMT: Mouth moist   NECK: Supple  NERVOUS SYSTEM:  awake, alert, non focal  CHEST/LUNG: Clear  HEART: RRR, S1, S2  ABDOMEN: Soft, non tender  EXTREMITIES:  no edema BL LE, rt thigh swelling, + tenderness  SKIN: No rash        LABS:                        8.7    8.37  )-----------( 205      ( 16 Sep 2023 07:25 )             25.9     09-16    139  |  110<H>  |  49<H>  ----------------------------<  142<H>  5.7<H>   |  23  |  1.39<H>    Ca    8.5      16 Sep 2023 11:35    TPro  6.1  /  Alb  x   /  TBili  x   /  DBili  x   /  AST  x   /  ALT  x   /  AlkPhos  x   09-14    PT/INR - ( 16 Sep 2023 07:25 )   PT: 12.5 sec;   INR: 1.05 ratio         PTT - ( 16 Sep 2023 07:25 )  PTT:28.2 sec  Urinalysis Basic - ( 16 Sep 2023 11:35 )    Color: x / Appearance: x / SG: x / pH: x  Gluc: 142 mg/dL / Ketone: x  / Bili: x / Urobili: x   Blood: x / Protein: x / Nitrite: x   Leuk Esterase: x / RBC: x / WBC x   Sq Epi: x / Non Sq Epi: x / Bacteria: x      CAPILLARY BLOOD GLUCOSE          RADIOLOGY & ADDITIONAL TESTS:    Imaging Personally Reviewed:  [ ] YES  [ ] NO    Consultant(s) Notes Reviewed:  [ ] YES  [ ] NO    Care Discussed with Consultants/Other Providers [ ] YES  [ ] NO

## 2023-09-17 ENCOUNTER — TRANSCRIPTION ENCOUNTER (OUTPATIENT)
Age: 83
End: 2023-09-17

## 2023-09-17 LAB
ANION GAP SERPL CALC-SCNC: 4 MMOL/L — LOW (ref 5–17)
BUN SERPL-MCNC: 44 MG/DL — HIGH (ref 7–23)
CALCIUM SERPL-MCNC: 8.1 MG/DL — LOW (ref 8.5–10.1)
CHLORIDE SERPL-SCNC: 111 MMOL/L — HIGH (ref 96–108)
CO2 SERPL-SCNC: 25 MMOL/L — SIGNIFICANT CHANGE UP (ref 22–31)
CREAT SERPL-MCNC: 1.17 MG/DL — SIGNIFICANT CHANGE UP (ref 0.5–1.3)
EGFR: 47 ML/MIN/1.73M2 — LOW
GLUCOSE SERPL-MCNC: 140 MG/DL — HIGH (ref 70–99)
HCT VFR BLD CALC: 21.6 % — LOW (ref 34.5–45)
HCT VFR BLD CALC: 24.7 % — LOW (ref 34.5–45)
HGB BLD-MCNC: 7 G/DL — CRITICAL LOW (ref 11.5–15.5)
HGB BLD-MCNC: 8 G/DL — LOW (ref 11.5–15.5)
MCHC RBC-ENTMCNC: 29.5 PG — SIGNIFICANT CHANGE UP (ref 27–34)
MCHC RBC-ENTMCNC: 30.2 PG — SIGNIFICANT CHANGE UP (ref 27–34)
MCHC RBC-ENTMCNC: 32.4 G/DL — SIGNIFICANT CHANGE UP (ref 32–36)
MCHC RBC-ENTMCNC: 32.4 G/DL — SIGNIFICANT CHANGE UP (ref 32–36)
MCV RBC AUTO: 91.1 FL — SIGNIFICANT CHANGE UP (ref 80–100)
MCV RBC AUTO: 93.1 FL — SIGNIFICANT CHANGE UP (ref 80–100)
NRBC # BLD: 0 /100 WBCS — SIGNIFICANT CHANGE UP (ref 0–0)
NRBC # BLD: 1 /100 WBCS — HIGH (ref 0–0)
PLATELET # BLD AUTO: 175 K/UL — SIGNIFICANT CHANGE UP (ref 150–400)
PLATELET # BLD AUTO: 182 K/UL — SIGNIFICANT CHANGE UP (ref 150–400)
POTASSIUM SERPL-MCNC: 5.4 MMOL/L — HIGH (ref 3.5–5.3)
POTASSIUM SERPL-SCNC: 5.4 MMOL/L — HIGH (ref 3.5–5.3)
RBC # BLD: 2.32 M/UL — LOW (ref 3.8–5.2)
RBC # BLD: 2.71 M/UL — LOW (ref 3.8–5.2)
RBC # FLD: 20.8 % — HIGH (ref 10.3–14.5)
RBC # FLD: 23.5 % — HIGH (ref 10.3–14.5)
SODIUM SERPL-SCNC: 140 MMOL/L — SIGNIFICANT CHANGE UP (ref 135–145)
WBC # BLD: 5.91 K/UL — SIGNIFICANT CHANGE UP (ref 3.8–10.5)
WBC # BLD: 6.4 K/UL — SIGNIFICANT CHANGE UP (ref 3.8–10.5)
WBC # FLD AUTO: 5.91 K/UL — SIGNIFICANT CHANGE UP (ref 3.8–10.5)
WBC # FLD AUTO: 6.4 K/UL — SIGNIFICANT CHANGE UP (ref 3.8–10.5)

## 2023-09-17 PROCEDURE — 99232 SBSQ HOSP IP/OBS MODERATE 35: CPT

## 2023-09-17 RX ORDER — SODIUM ZIRCONIUM CYCLOSILICATE 10 G/10G
10 POWDER, FOR SUSPENSION ORAL ONCE
Refills: 0 | Status: COMPLETED | OUTPATIENT
Start: 2023-09-17 | End: 2023-09-17

## 2023-09-17 RX ADMIN — GABAPENTIN 400 MILLIGRAM(S): 400 CAPSULE ORAL at 05:32

## 2023-09-17 RX ADMIN — Medication 650 MILLIGRAM(S): at 21:34

## 2023-09-17 RX ADMIN — Medication 25 MILLIGRAM(S): at 17:18

## 2023-09-17 RX ADMIN — Medication 1 MILLIGRAM(S): at 11:11

## 2023-09-17 RX ADMIN — OXYCODONE HYDROCHLORIDE 5 MILLIGRAM(S): 5 TABLET ORAL at 23:27

## 2023-09-17 RX ADMIN — PANTOPRAZOLE SODIUM 40 MILLIGRAM(S): 20 TABLET, DELAYED RELEASE ORAL at 09:26

## 2023-09-17 RX ADMIN — SENNA PLUS 2 TABLET(S): 8.6 TABLET ORAL at 21:34

## 2023-09-17 RX ADMIN — SIMVASTATIN 10 MILLIGRAM(S): 20 TABLET, FILM COATED ORAL at 21:34

## 2023-09-17 RX ADMIN — BUDESONIDE AND FORMOTEROL FUMARATE DIHYDRATE 2 PUFF(S): 160; 4.5 AEROSOL RESPIRATORY (INHALATION) at 17:18

## 2023-09-17 RX ADMIN — MIDODRINE HYDROCHLORIDE 10 MILLIGRAM(S): 2.5 TABLET ORAL at 05:33

## 2023-09-17 RX ADMIN — AMIODARONE HYDROCHLORIDE 100 MILLIGRAM(S): 400 TABLET ORAL at 13:57

## 2023-09-17 RX ADMIN — OXYCODONE HYDROCHLORIDE 5 MILLIGRAM(S): 5 TABLET ORAL at 16:14

## 2023-09-17 RX ADMIN — SODIUM CHLORIDE 100 MILLILITER(S): 9 INJECTION INTRAMUSCULAR; INTRAVENOUS; SUBCUTANEOUS at 13:56

## 2023-09-17 RX ADMIN — TIOTROPIUM BROMIDE 2 PUFF(S): 18 CAPSULE ORAL; RESPIRATORY (INHALATION) at 11:11

## 2023-09-17 RX ADMIN — Medication 650 MILLIGRAM(S): at 22:34

## 2023-09-17 RX ADMIN — Medication 25 MILLIGRAM(S): at 09:26

## 2023-09-17 RX ADMIN — GABAPENTIN 400 MILLIGRAM(S): 400 CAPSULE ORAL at 17:18

## 2023-09-17 RX ADMIN — MIDODRINE HYDROCHLORIDE 10 MILLIGRAM(S): 2.5 TABLET ORAL at 17:18

## 2023-09-17 RX ADMIN — SODIUM ZIRCONIUM CYCLOSILICATE 10 GRAM(S): 10 POWDER, FOR SUSPENSION ORAL at 13:57

## 2023-09-17 RX ADMIN — MONTELUKAST 10 MILLIGRAM(S): 4 TABLET, CHEWABLE ORAL at 21:34

## 2023-09-17 RX ADMIN — BUDESONIDE AND FORMOTEROL FUMARATE DIHYDRATE 2 PUFF(S): 160; 4.5 AEROSOL RESPIRATORY (INHALATION) at 05:33

## 2023-09-17 RX ADMIN — Medication 10 MILLIGRAM(S): at 05:32

## 2023-09-17 RX ADMIN — MIDODRINE HYDROCHLORIDE 10 MILLIGRAM(S): 2.5 TABLET ORAL at 11:11

## 2023-09-17 RX ADMIN — OXYCODONE HYDROCHLORIDE 5 MILLIGRAM(S): 5 TABLET ORAL at 15:35

## 2023-09-17 NOTE — PROGRESS NOTE ADULT - SUBJECTIVE AND OBJECTIVE BOX
Patient seen and examined at bedside. Pain is well controlled. Patient denies fevers, chills, saddle anesthesia, bowel or bladder incontinence, leg pain, numbness, weakness, or any other orthopaedic complaint. Still has pain from thigh hematoma but otherwise feels well.     Exam:  Vital Signs Last 24 Hrs  T(C): 36.5 (17 Sep 2023 07:00), Max: 37.4 (16 Sep 2023 19:22)  T(F): 97.7 (17 Sep 2023 07:00), Max: 99.4 (16 Sep 2023 19:22)  HR: 67 (17 Sep 2023 07:00) (64 - 79)  BP: 94/57 (17 Sep 2023 07:00) (94/57 - 164/60)  BP(mean): 70 (16 Sep 2023 19:52) (70 - 79)  RR: 18 (17 Sep 2023 07:00) (15 - 20)  SpO2: 96% (17 Sep 2023 07:00) (95% - 100%)    Parameters below as of 17 Sep 2023 07:00  Patient On (Oxygen Delivery Method): room air      Spine:  Dressing in place, c/d/i.   Yue-incisional TTP; otherwise, NTTP throughout the rest of the extremity.   Negative Lim, Negative Babinski, Negative myoclonus bilaterally  Radial, DP pulses palpable.  No calf tenderness bilaterally.  Compartments soft and compressible.     Motor:                   Elbow Flex     Wrist Ext      Elbow Ext      Finger Flex     Finger Abd               R                   5/5                 5/5                 5/5                  5/5                 5/5  L                    5/5                 5/5                 5/5                  5/5                 5/5              Hip Flex     Knee Ext     Ankle Dorsi     Hallux Ext     Ankle Plant  R            3/5              3/5               4/5                    5/5                5/5  L             5/5             5/5                5/5                   5/5                 5/5    Sensory:            C5         C6         C7      C8       T1        (0=absent, 1=impaired, 2=normal, NT=not testable)  R         2            2           2        2         2  L          2            2           2        2         2               L2          L3         L4      L5       S1         (0=absent, 1=impaired, 2=normal, NT=not testable)  R         2            2            2        2        2  L          2            2           2        2         2                      Assessment and Plan:  82y Female POD0 L2 kyphoplasty    Appreciate medical recs  Hgb 7, will likely transfuse  WBAT/PT/OT  Pain management PRN  Continue prophylactic antibiotics  DVT PPx: hold after spine surgery, SCDs ok  Incentive spirometry  Dispo: pending recommendations per PT  Will discuss with Dr. Chambers and advise of any changes to the plan.

## 2023-09-17 NOTE — DISCHARGE NOTE PROVIDER - NSDCMRMEDTOKEN_GEN_ALL_CORE_FT
amiodarone 100 mg oral tablet: 1 tab(s) orally once a day  Bentyl 10 mg oral capsule: 1 cap(s) orally 2 times a day, As Needed  Coumadin 4 mg oral tablet: 1 tab(s) orally once a day  4mg 4x week; 6mg 3x week  folic acid 1 mg oral tablet: 1 tab(s) orally once a day  gabapentin 400 mg oral capsule: 1 cap(s) orally 2 times a day  methocarbamol 500 mg oral tablet: 2 tab(s) orally every 8 hours as needed for pain/tightness  methocarbamol 500 mg oral tablet: 1 tab(s) orally 4 times a day  midodrine 5 mg oral tablet: 2 orally 3 times a day  NexIUM: 15 milligram(s) orally once a day  predniSONE 10 mg oral tablet: 1 tab(s) orally once a day  Procrit 10,000 units/mL preservative-free injectable solution: injectable once a week  Prolia 60 mg/mL subcutaneous solution: 60 subcutaneously  simvastatin 10 mg oral tablet: 1 tab(s) orally once a day (at bedtime)  Singulair 10 mg oral tablet: 1 tab(s) orally once a day  traMADol 50 mg oral tablet: 1 tab(s) orally 2 times a day  Trelegy Ellipta: inhaled once a day   acetaminophen 325 mg oral tablet: 2 tab(s) orally every 6 hours As needed Mild Pain (1 - 3), Moderate Pain (4 - 6)  amiodarone 100 mg oral tablet: 1 tab(s) orally once a day  apixaban 2.5 mg oral tablet: 1 tab(s) orally 2 times a day  Bentyl 10 mg oral capsule: 1 cap(s) orally 2 times a day, As Needed  budesonide-formoterol 80 mcg-4.5 mcg/inh inhalation aerosol: inhaled once a day  folic acid 1 mg oral tablet: 1 tab(s) orally once a day  gabapentin 400 mg oral capsule: 1 cap(s) orally 2 times a day  methocarbamol 500 mg oral tablet: 2 tab(s) orally every 8 hours as needed for pain/tightness  methocarbamol 500 mg oral tablet: 1 tab(s) orally 4 times a day  midodrine 5 mg oral tablet: 2 orally 3 times a day  NexIUM: 15 milligram(s) orally once a day  oxyCODONE 5 mg oral tablet: 1 tab(s) orally every 6 hours As needed Severe Pain (7 - 10)  predniSONE 10 mg oral tablet: 1 tab(s) orally once a day  Procrit 10,000 units/mL preservative-free injectable solution: injectable once a week  Prolia 60 mg/mL subcutaneous solution: 60 subcutaneously  senna leaf extract oral tablet: 2 tab(s) orally once a day (at bedtime)  senna leaf extract oral tablet: 2 tab(s) orally once a day (at bedtime)  simvastatin 10 mg oral tablet: 1 tab(s) orally once a day (at bedtime)  Singulair 10 mg oral tablet: 1 tab(s) orally once a day  tiotropium 2.5 mcg/inh inhalation aerosol: 2 puff(s) inhaled once a day  traMADol 50 mg oral tablet: 1 tab(s) orally 2 times a day  Trelegy Ellipta: inhaled once a day

## 2023-09-17 NOTE — PROGRESS NOTE ADULT - SUBJECTIVE AND OBJECTIVE BOX
Patient is a 82y old  Female who presents with a chief complaint of Abnormal lab values - preoperative (17 Sep 2023 12:29)      INTERVAL HPI/OVERNIGHT EVENTS:  Pt was seen and examined, no acute events.      MEDICATIONS  (STANDING):  aMIOdarone    Tablet 100 milliGRAM(s) Oral daily  budesonide  80 MICROgram(s)/formoterol 4.5 MICROgram(s) Inhaler 2 Puff(s) Inhalation two times a day  folic acid 1 milliGRAM(s) Oral daily  gabapentin 400 milliGRAM(s) Oral two times a day  hydrocortisone sodium succinate Injectable 25 milliGRAM(s) IV Push every 8 hours  influenza  Vaccine (HIGH DOSE) 0.7 milliLiter(s) IntraMuscular once  midodrine. 10 milliGRAM(s) Oral three times a day  montelukast 10 milliGRAM(s) Oral at bedtime  pantoprazole    Tablet 40 milliGRAM(s) Oral before breakfast  predniSONE   Tablet 10 milliGRAM(s) Oral daily  senna 2 Tablet(s) Oral at bedtime  simvastatin 10 milliGRAM(s) Oral at bedtime  sodium chloride 0.9%. 1000 milliLiter(s) (100 mL/Hr) IV Continuous <Continuous>  tiotropium 2.5 MICROgram(s) Inhaler 2 Puff(s) Inhalation daily    MEDICATIONS  (PRN):  acetaminophen     Tablet .. 650 milliGRAM(s) Oral every 6 hours PRN Mild Pain (1 - 3), Moderate Pain (4 - 6)  oxyCODONE    IR 5 milliGRAM(s) Oral every 6 hours PRN Severe Pain (7 - 10)      Allergies    No Known Allergies    Intolerances          Vital Signs Last 24 Hrs  T(C): 36.7 (17 Sep 2023 15:39), Max: 37.4 (16 Sep 2023 19:22)  T(F): 98.1 (17 Sep 2023 15:39), Max: 99.4 (16 Sep 2023 19:22)  HR: 70 (17 Sep 2023 15:39) (64 - 72)  BP: 105/65 (17 Sep 2023 15:39) (92/57 - 164/60)  BP(mean): 70 (16 Sep 2023 19:52) (70 - 79)  RR: 17 (17 Sep 2023 15:39) (15 - 20)  SpO2: 96% (17 Sep 2023 15:39) (96% - 100%)    Parameters below as of 17 Sep 2023 11:56  Patient On (Oxygen Delivery Method): room air        PHYSICAL EXAM:  GENERAL: NAD  HEAD:  Atraumatic  EYES: PERRLA  ENMT: Mouth moist   NECK: Supple  NERVOUS SYSTEM:  awake, alert, non focal  CHEST/LUNG: Clear  HEART: RRR, S1, S2  ABDOMEN: Soft, non tender  EXTREMITIES:  no edema BL LE, rt thigh swelling, + tenderness  SKIN: No rash          LABS:                        8.0    5.91  )-----------( 182      ( 17 Sep 2023 14:30 )             24.7     09-17    140  |  111<H>  |  44<H>  ----------------------------<  140<H>  5.4<H>   |  25  |  1.17    Ca    8.1<L>      17 Sep 2023 04:40      PT/INR - ( 16 Sep 2023 07:25 )   PT: 12.5 sec;   INR: 1.05 ratio         PTT - ( 16 Sep 2023 07:25 )  PTT:28.2 sec  Urinalysis Basic - ( 17 Sep 2023 04:40 )    Color: x / Appearance: x / SG: x / pH: x  Gluc: 140 mg/dL / Ketone: x  / Bili: x / Urobili: x   Blood: x / Protein: x / Nitrite: x   Leuk Esterase: x / RBC: x / WBC x   Sq Epi: x / Non Sq Epi: x / Bacteria: x      CAPILLARY BLOOD GLUCOSE          RADIOLOGY & ADDITIONAL TESTS:    Imaging Personally Reviewed:  [ ] YES  [ ] NO    Consultant(s) Notes Reviewed:  [ ] YES  [ ] NO    Care Discussed with Consultants/Other Providers [ ] YES  [ ] NO

## 2023-09-17 NOTE — DISCHARGE NOTE PROVIDER - PROVIDER TOKENS
PROVIDER:[TOKEN:[[id number]],FOLLOWUP:[2 weeks]] PROVIDER:[TOKEN:[[id number]],FOLLOWUP:[2 weeks]],PROVIDER:[TOKEN:[[id number]],FOLLOWUP:[1 week]]

## 2023-09-17 NOTE — DISCHARGE NOTE PROVIDER - HOSPITAL COURSE
Patient had an elective Kyphoplasty scheduled with Dr. Chambers on Thursday 9/14, however case was cancelled secondary to symptomatic anemia of low blood pressures/weakness/dizziness. Patient was admitted to the hospital for a rescheduled Kyphoplasty performed on Saturday 9/16. Patient was seen by medicine and heme/onc, and was medically optimized for surgery. Patient has remained stable until discharge, rest of hospital course was unremarkable. Patient had an elective Kyphoplasty scheduled with Dr. Chambers on Thursday 9/14, however case was cancelled secondary to symptomatic anemia of low blood pressures/weakness/dizziness. Patient was admitted to the hospital for a rescheduled Kyphoplasty performed on Saturday 9/16. Patient was seen by medicine and heme/onc, and was medically optimized for surgery. Patient has remained stable until discharge, rest of hospital course was unremarkable. Heme onc evaluated and recommended eliquis for anticoagulation. Pt was examined on day of discharge and orthopedic stable.

## 2023-09-17 NOTE — DISCHARGE NOTE PROVIDER - NSDCCPCAREPLAN_GEN_ALL_CORE_FT
PRINCIPAL DISCHARGE DIAGNOSIS  Diagnosis: Symptomatic anemia  Assessment and Plan of Treatment:

## 2023-09-17 NOTE — PROGRESS NOTE ADULT - ASSESSMENT
82 years old female with h/o Afib, PMR, aplastic anemia, HLD, orthostatic hypotension on midodrine present to ED with complain of low Hb from preop. Patient reported generalized weakness and lightheadedness. Denied any chest pain or SOB. No vomiting of blood or rectal bleed. Patient reported stool slightly dark as she took pepto bismol recently. Last transfusion was around 04/2023. Currently coumadin on hold and on lovenox. Patient does noted slight right thigh pain and swelling for last day or so.   Hemodynamically stable, afebrile, sat well at RA. No leukocytosis, Hb 6.9 ( 9.1 9/7/23), plt 289, K 4.3, Cr 1.35    Acute blood loss anemia/ H/O aplastic anemia:  - Symptomatic on admission   - Brought in from preop for low Hb, dizziness, generalized weakness  - Last transfusion on 04/2023, following with outpatient hematology, getting procrit, IVIG  - Large right thigh hematoma noted in CT   - S/P 1 unit of PRBC , another unit transfused today  - Hematology consult appreciated   - Monitor H/H, transfuse as needed to keep Hb> 7 or if patient is symptomatic  - Hold Lovenox for now until Hb stable  - Ortho / gen sx follow up regarding hematoma    PMR (polymyalgia rheumatica):  - H/O PMR on steroid since around 05/2023, gradually tapering down  - Currently on prednisone 10mg daily    Low back pain:  - S/P kyphoplasty by ortho team  - Pain control  - Continue gabapentin.    Paroxysmal atrial fibrillation:  - S/P ablation, following with outpatient cardiology, last seen 9/7/23 ( terry bustamante). per note, had a Ziopatch in 2/2021 that showed symptomatic PACs and PAT episodes. There was no AF seen.  - Was on coumadin being held with plan for OR, on Lovenox Will hold for now given anemia  - AC held for large hematoma and anemia    Low back pain:  - Being planned for kyphoplasty by ortho team  - Pain control--> oxy prn for severe pain, IV morphine for breakthrough pain  - Continue gabapentin.  - No medical contraindication for planned procedure     KIMBERLY with hyperkalemia  - Cr improved today  - US with no hydro  - IVF  - S/P Insulin/ D50 and Lokelma for hyperK, low K diet , another dose of Lokelma today    Hypotension:  - Chronic   - Continue midodrine    Hyperlipidemia:  - On statin.

## 2023-09-17 NOTE — DISCHARGE NOTE PROVIDER - NSDCFUSCHEDAPPT_GEN_ALL_CORE_FT
[facility]  CARDIOLOGY [address]  Scheduled Appointment: 09/21/2023    Robbi Chambers  [facility]  ONCORTHO [address]  Scheduled Appointment: 09/29/2023    Leia Gunn  [facility] Phys[facility]  RHEUM [address]  Scheduled Appointment: 11/15/2023     [facility]  ONCORTHO [address]  Scheduled Appointment: 09/29/2023    [facility]  RHEUM [address]  Scheduled Appointment: 11/15/2023

## 2023-09-17 NOTE — DISCHARGE NOTE PROVIDER - CARE PROVIDER_API CALL
Robbi Chambers  Orthopaedic Surgery  [address] [place] [address]  Phone: [phone]  Fax: [phone]  Follow Up Time: 2 weeks   Robbi Chambers  Orthopaedic Surgery  [address] [place] [address]  Phone: [phone]  Fax: [phone]  Follow Up Time: 2 weeks    [facility]  [address], [address]  [place] [address]  Phone: [phone]  Fax: [phone]  Follow Up Time: 1 week

## 2023-09-17 NOTE — DISCHARGE NOTE PROVIDER - NSDCFUADDINST_GEN_ALL_CORE_FT
Patient is WBAT.   PT OT recommended.   Please follow up with your hematologist, nephrologist, endocrinologist and rheumatologist in 1-2 weeks.  Please report back to the ED if you have a fever of over 100, numbness and tingling, or acute neurological changes.   Follow up with Dr. Chambers in 2 weeks postoperatively.   Please continue your DVT ppx upon discharge and follow up with your hematologist to ensure adequate care.  Patient is WBAT.   PT OT daily recommended.   Please follow up with your hematologist, nephrologist, endocrinologist and rheumatologist in 1-2 weeks.  Please report back to the ED if you have a fever of over 100, numbness and tingling, or acute neurological changes.   Follow up with Dr. Chambers in 2 weeks postoperatively.   Please continue your DVT ppx, Eliquis 2.5mg BID, do not skip any doses upon discharge and follow up with your hematologist in 1 week to ensure adequate care.  Patient is WBAT.   PT OT daily recommended.   -Follow up with outpatient w/ Hematologist Dr. Parker. Per hematology at [facility] pt has Appointment for 9/27 @1045am  - Please take Eliquis 2.5mg BID per hematology recommendations  Please follow up with your cardiologist, nephrologist, endocrinologist and rheumatologist within 1 week after discharge  Please report back to the ED if you have a fever of over 100, numbness and tingling, or acute neurological changes.   Follow up with Dr. Chambers in 2 weeks postoperatively.   Please continue your DVT ppx, Eliquis 2.5mg BID, do not skip any doses upon discharge and follow up with your hematologist to ensure adequate care.

## 2023-09-18 LAB
% ALBUMIN: 66 % — SIGNIFICANT CHANGE UP
% ALPHA 1: 5.3 % — SIGNIFICANT CHANGE UP
% ALPHA 2: 8.5 % — SIGNIFICANT CHANGE UP
% BETA: 8.9 % — SIGNIFICANT CHANGE UP
% GAMMA: 11.3 % — SIGNIFICANT CHANGE UP
ALBUMIN SERPL ELPH-MCNC: 4 G/DL — SIGNIFICANT CHANGE UP (ref 3.6–5.5)
ALBUMIN/GLOB SERPL ELPH: 1.9 RATIO — SIGNIFICANT CHANGE UP
ALPHA1 GLOB SERPL ELPH-MCNC: 0.3 G/DL — SIGNIFICANT CHANGE UP (ref 0.1–0.4)
ALPHA2 GLOB SERPL ELPH-MCNC: 0.5 G/DL — SIGNIFICANT CHANGE UP (ref 0.5–1)
ANION GAP SERPL CALC-SCNC: 3 MMOL/L — LOW (ref 5–17)
B-GLOBULIN SERPL ELPH-MCNC: 0.5 G/DL — SIGNIFICANT CHANGE UP (ref 0.5–1)
BUN SERPL-MCNC: 38 MG/DL — HIGH (ref 7–23)
CALCIUM SERPL-MCNC: 8 MG/DL — LOW (ref 8.5–10.1)
CHLORIDE SERPL-SCNC: 112 MMOL/L — HIGH (ref 96–108)
CO2 SERPL-SCNC: 25 MMOL/L — SIGNIFICANT CHANGE UP (ref 22–31)
CREAT SERPL-MCNC: 1.14 MG/DL — SIGNIFICANT CHANGE UP (ref 0.5–1.3)
EGFR: 49 ML/MIN/1.73M2 — LOW
GAMMA GLOBULIN: 0.7 G/DL — SIGNIFICANT CHANGE UP (ref 0.6–1.6)
GLUCOSE SERPL-MCNC: 96 MG/DL — SIGNIFICANT CHANGE UP (ref 70–99)
HCT VFR BLD CALC: 22.4 % — LOW (ref 34.5–45)
HGB BLD-MCNC: 7.4 G/DL — LOW (ref 11.5–15.5)
INTERPRETATION SERPL IFE-IMP: SIGNIFICANT CHANGE UP
MCHC RBC-ENTMCNC: 30 PG — SIGNIFICANT CHANGE UP (ref 27–34)
MCHC RBC-ENTMCNC: 33 G/DL — SIGNIFICANT CHANGE UP (ref 32–36)
MCV RBC AUTO: 90.7 FL — SIGNIFICANT CHANGE UP (ref 80–100)
NRBC # BLD: 1 /100 WBCS — HIGH (ref 0–0)
PLATELET # BLD AUTO: 187 K/UL — SIGNIFICANT CHANGE UP (ref 150–400)
POTASSIUM SERPL-MCNC: 4.6 MMOL/L — SIGNIFICANT CHANGE UP (ref 3.5–5.3)
POTASSIUM SERPL-SCNC: 4.6 MMOL/L — SIGNIFICANT CHANGE UP (ref 3.5–5.3)
PROT PATTERN SERPL ELPH-IMP: SIGNIFICANT CHANGE UP
RBC # BLD: 2.47 M/UL — LOW (ref 3.8–5.2)
RBC # FLD: 21.2 % — HIGH (ref 10.3–14.5)
SODIUM SERPL-SCNC: 140 MMOL/L — SIGNIFICANT CHANGE UP (ref 135–145)
TM INTERPRETATION: SIGNIFICANT CHANGE UP
WBC # BLD: 6.41 K/UL — SIGNIFICANT CHANGE UP (ref 3.8–10.5)
WBC # FLD AUTO: 6.41 K/UL — SIGNIFICANT CHANGE UP (ref 3.8–10.5)

## 2023-09-18 PROCEDURE — 73701 CT LOWER EXTREMITY W/DYE: CPT | Mod: 26,RT

## 2023-09-18 PROCEDURE — 99232 SBSQ HOSP IP/OBS MODERATE 35: CPT

## 2023-09-18 RX ORDER — CHLORHEXIDINE GLUCONATE 213 G/1000ML
1 SOLUTION TOPICAL DAILY
Refills: 0 | Status: DISCONTINUED | OUTPATIENT
Start: 2023-09-18 | End: 2023-09-20

## 2023-09-18 RX ADMIN — OXYCODONE HYDROCHLORIDE 5 MILLIGRAM(S): 5 TABLET ORAL at 10:47

## 2023-09-18 RX ADMIN — MIDODRINE HYDROCHLORIDE 10 MILLIGRAM(S): 2.5 TABLET ORAL at 17:36

## 2023-09-18 RX ADMIN — MIDODRINE HYDROCHLORIDE 10 MILLIGRAM(S): 2.5 TABLET ORAL at 12:13

## 2023-09-18 RX ADMIN — OXYCODONE HYDROCHLORIDE 5 MILLIGRAM(S): 5 TABLET ORAL at 18:11

## 2023-09-18 RX ADMIN — GABAPENTIN 400 MILLIGRAM(S): 400 CAPSULE ORAL at 17:36

## 2023-09-18 RX ADMIN — TIOTROPIUM BROMIDE 2 PUFF(S): 18 CAPSULE ORAL; RESPIRATORY (INHALATION) at 12:15

## 2023-09-18 RX ADMIN — OXYCODONE HYDROCHLORIDE 5 MILLIGRAM(S): 5 TABLET ORAL at 17:41

## 2023-09-18 RX ADMIN — BUDESONIDE AND FORMOTEROL FUMARATE DIHYDRATE 2 PUFF(S): 160; 4.5 AEROSOL RESPIRATORY (INHALATION) at 05:35

## 2023-09-18 RX ADMIN — OXYCODONE HYDROCHLORIDE 5 MILLIGRAM(S): 5 TABLET ORAL at 00:27

## 2023-09-18 RX ADMIN — PANTOPRAZOLE SODIUM 40 MILLIGRAM(S): 20 TABLET, DELAYED RELEASE ORAL at 10:14

## 2023-09-18 RX ADMIN — Medication 10 MILLIGRAM(S): at 05:35

## 2023-09-18 RX ADMIN — GABAPENTIN 400 MILLIGRAM(S): 400 CAPSULE ORAL at 05:35

## 2023-09-18 RX ADMIN — Medication 1 MILLIGRAM(S): at 12:13

## 2023-09-18 RX ADMIN — SENNA PLUS 2 TABLET(S): 8.6 TABLET ORAL at 21:15

## 2023-09-18 RX ADMIN — MIDODRINE HYDROCHLORIDE 10 MILLIGRAM(S): 2.5 TABLET ORAL at 05:35

## 2023-09-18 RX ADMIN — SIMVASTATIN 10 MILLIGRAM(S): 20 TABLET, FILM COATED ORAL at 21:15

## 2023-09-18 RX ADMIN — AMIODARONE HYDROCHLORIDE 100 MILLIGRAM(S): 400 TABLET ORAL at 05:34

## 2023-09-18 RX ADMIN — SODIUM CHLORIDE 100 MILLILITER(S): 9 INJECTION INTRAMUSCULAR; INTRAVENOUS; SUBCUTANEOUS at 05:34

## 2023-09-18 RX ADMIN — OXYCODONE HYDROCHLORIDE 5 MILLIGRAM(S): 5 TABLET ORAL at 10:17

## 2023-09-18 RX ADMIN — OXYCODONE HYDROCHLORIDE 5 MILLIGRAM(S): 5 TABLET ORAL at 23:49

## 2023-09-18 RX ADMIN — BUDESONIDE AND FORMOTEROL FUMARATE DIHYDRATE 2 PUFF(S): 160; 4.5 AEROSOL RESPIRATORY (INHALATION) at 17:35

## 2023-09-18 RX ADMIN — MONTELUKAST 10 MILLIGRAM(S): 4 TABLET, CHEWABLE ORAL at 21:15

## 2023-09-18 NOTE — PROGRESS NOTE ADULT - SUBJECTIVE AND OBJECTIVE BOX
Patient seen and examined at bedside. Pain is well controlled. Patient endorses only pain in right thigh from hematoma and inability to move her leg much. Patient could not participate in PT yesterday, and will be given pain medication prior to PT today to help participate. Patient denies fevers, chills, saddle anesthesia, bowel or bladder incontinence, leg pain, numbness, weakness, or any other orthopaedic complaint.     Vital Signs (24 Hrs):  T(C): 36.8 (09-18-23 @ 05:05), Max: 36.9 (09-17-23 @ 11:56)  HR: 60 (09-18-23 @ 05:05) (60 - 70)  BP: 105/65 (09-18-23 @ 05:05) (92/57 - 108/60)  RR: 18 (09-18-23 @ 05:05) (17 - 18)  SpO2: 97% (09-18-23 @ 05:05) (96% - 99%)  Wt(kg): --    LABS:                          8.0    5.91  )-----------( 182      ( 17 Sep 2023 14:30 )             24.7     09-17    140  |  111<H>  |  44<H>  ----------------------------<  140<H>  5.4<H>   |  25  |  1.17    Ca    8.1<L>      17 Sep 2023 04:40        PT/INR - ( 16 Sep 2023 07:25 )   PT: 12.5 sec;   INR: 1.05 ratio         PTT - ( 16 Sep 2023 07:25 )  PTT:28.2 sec    RLE: TTP at groin area, mildly swelling, no erythema or discoloration, sensation intact  Spine:  Dressing in place, c/d/i.   Yue-incisional TTP; otherwise, NTTP throughout the rest of the extremity.   Negative Lim, Negative Babinski, Negative myoclonus bilaterally  Radial, DP pulses palpable.  No calf tenderness bilaterally.  Compartments soft and compressible.     Motor:                   Elbow Flex     Wrist Ext      Elbow Ext      Finger Flex     Finger Abd               R                   5/5                 5/5                 5/5                  5/5                 5/5  L                    5/5                 5/5                 5/5                  5/5                 5/5              Hip Flex     Knee Ext     Ankle Dorsi     Hallux Ext     Ankle Plant  R            3/5              3/5               4/5                    5/5                5/5  L             5/5             5/5                5/5                   5/5                 5/5    Sensory:            C5         C6         C7      C8       T1        (0=absent, 1=impaired, 2=normal, NT=not testable)  R         2            2           2        2         2  L          2            2           2        2         2               L2          L3         L4      L5       S1         (0=absent, 1=impaired, 2=normal, NT=not testable)  R         2            2            2        2        2  L          2            2           2        2         2               Assessment and Plan:  82y Female POD2 L2 kyphoplasty    Appreciate medical recs  Hgb 8, will consider transfusion  WBAT/PT/OT daily, pre-medication with pain medications for PT participation  Pain management PRN  DVT PPx: to restart today on POD2 per Dr. Chambers, will consider Heme/Onc recommendations on bridging back to home coumadin  Incentive spirometry  Dispo: pending recommendations per PT reevaluation  Will discuss with Dr. Chambers and advise of any changes to the plan.

## 2023-09-18 NOTE — PROGRESS NOTE ADULT - ASSESSMENT
81 y/o female with afib was on coumadin but currently on lovenox, aplastic anemia, copd, PMR, htn sent in by preop testing (kyphoplasty) for low bp and low hemoglobin. Pt states she went to see her oncologist , had blood work done showing hgb 7.9 and was told to come to the ED for blood transfusion.    #Macrocytic anemia   -patient has hx of chronic anemia stated she is seen by Dr. Hari Parker [phone] for aplastic anemia, myelodysplasia and IgG subclass def. being tx w. weekly procrit inj and, IVIG transfusion 1x/month  -as per patient no BM BX ever done-declined to get one this hosp stay r/t increased back pain.   -patient baseline hgb around 9  -on 9/7 hgb 9.1 when presented to ER (9/14) hgb 6.9  -no large ecchymotic areas noted, but tenderness and edema noted in R upper thigh/groin area   -manual smear noted for smudge cells, and basophilic stippling- no note of schistocytes    -US RLE(8/19) showing No evidence of right lower extremity deep venous thrombosis, Large right thigh hematoma.  -CT A/p and CT RLE- showing Enlargement is seen within the adductor muscles roughly spanning 18.2 cm craniocaudad beginning at the level of the pubic symphysis and extending distally with medial femur, Additional enlargement of the vastus medialis spanning roughly 26 cm craniocaudad beginning at the level of the pubic symphysis and extending distally throughout the anterior to medial femur.   -as per patient sustained a fall aug 2023 where she injured her L-spine (was on coumadin at the time) because of severe back pain patient had planned for elective kyphoplasty, stated she has since not fallen or had any traumas since event. To note patient transitioned from coumadin to LMWH in preparation for procedure on Monday 9/11.  -Tiron-242, TIBC/%sat-unable to quant., ferritin-1342  -Hemolysis (-),willy (-),  , Fol-16.2, TSH-nl  -pending FOBT-up to date on colon screenings   -pending further anemia w/u   -patient hgb continues to drop after receiving PRBC, -RLE assessed and appears more edematous and firm would rec repeating CT RLE to r/u active bleed/ worsening hematoma   -would rec holding AC until RLE is reevaluated for worsening bleed   -maintain hgb >7 or if symptomatic   -when patient stabilized for d/c will need to f/u w/ Hematologist Dr. Parker.       Will continue to monitor the patient.  Please call with any questions [phone]  Above reviewed with Attending Dr. Jose CALDERON/[facility]  [address], [address], [place], [place]  [phone]  *Note not finalized until signed by Attending Physician    81 y/o female with afib was on coumadin but currently on lovenox, aplastic anemia, copd, PMR, htn sent in by preop testing (kyphoplasty) for low bp and low hemoglobin. Pt states she went to see her oncologist , had blood work done showing hgb 7.9 and was told to come to the ED for blood transfusion.    #Macrocytic anemia   -patient has hx of chronic anemia stated she is seen by Dr. Hari Parker [phone] for aplastic anemia, myelodysplasia and IgG subclass def. being tx w. weekly procrit inj and, IVIG transfusion 1x/month  -as per patient no BM BX ever done-declined to get one this hosp stay r/t increased back pain.   -patient baseline hgb around 9  -on 9/7 hgb 9.1 when presented to ER (9/14) hgb 6.9  -no large ecchymotic areas noted, but tenderness and edema noted in R upper thigh/groin area   -manual smear noted for smudge cells, and basophilic stippling- no note of schistocytes    -US RLE(8/19) showing No evidence of right lower extremity deep venous thrombosis, Large right thigh hematoma.  -CT A/p and CT RLE- showing Enlargement is seen within the adductor muscles roughly spanning 18.2 cm craniocaudad beginning at the level of the pubic symphysis and extending distally with medial femur, Additional enlargement of the vastus medialis spanning roughly 26 cm craniocaudad beginning at the level of the pubic symphysis and extending distally throughout the anterior to medial femur.   -as per patient sustained a fall aug 2023 where she injured her L-spine (was on coumadin at the time) because of severe back pain patient had planned for elective kyphoplasty, stated she has since not fallen or had any traumas since event. To note patient transitioned from coumadin to LMWH in preparation for procedure on Monday 9/11.  -Tiron-242, TIBC/%sat-unable to quant., ferritin-1342  -Hemolysis (-),willy (-),  , Fol-16.2, TSH-nl  -pending FOBT-up to date on colon screenings   -spep/erika- NL  -pending flow cyt  -patient hgb continues to drop after receiving PRBC, -RLE assessed and appears more edematous and firm would rec repeating CT RLE to r/u active bleed/ worsening hematoma   -would rec holding AC until RLE is reevaluated for worsening bleed   -maintain hgb >7 or if symptomatic   -when patient stabilized for d/c will need to f/u w/ Hematologist Dr. Parker.       Will continue to monitor the patient.  Please call with any questions [phone]  Above reviewed with Attending Dr. Jose CALDERON/[facility]  [address], [address], [place], [place]  [phone]  *Note not finalized until signed by Attending Physician

## 2023-09-18 NOTE — PROGRESS NOTE ADULT - SUBJECTIVE AND OBJECTIVE BOX
Heme/Onc Progress note    INTERVAL HPI/OVERNIGHT EVENTS:  Patient S&E at bedside. No o/n events,  still c/o pain in RLE, worried about drops in hgb levels.  VITAL SIGNS:  T(F): 98.2 (09-18-23 @ 05:05)  HR: 60 (09-18-23 @ 05:05)  BP: 105/65 (09-18-23 @ 05:05)  RR: 18 (09-18-23 @ 05:05)  SpO2: 97% (09-18-23 @ 05:05)  Wt(kg): --    PHYSICAL EXAM:    Constitutional: NAD  Eyes: EOMI, sclera non-icteric  Neck: supple, no masses, no JVD  Respiratory: diminished b/l   Cardiovascular: RRR, no M/R/G  Gastrointestinal: soft, NTND, no masses palpable, + BS,   Extremities: RLE firm and edematous in the upper leg/thigh area   Neurological: AAOx3      MEDICATIONS  (STANDING):  aMIOdarone    Tablet 100 milliGRAM(s) Oral daily  budesonide  80 MICROgram(s)/formoterol 4.5 MICROgram(s) Inhaler 2 Puff(s) Inhalation two times a day  folic acid 1 milliGRAM(s) Oral daily  gabapentin 400 milliGRAM(s) Oral two times a day  influenza  Vaccine (HIGH DOSE) 0.7 milliLiter(s) IntraMuscular once  midodrine. 10 milliGRAM(s) Oral three times a day  montelukast 10 milliGRAM(s) Oral at bedtime  pantoprazole    Tablet 40 milliGRAM(s) Oral before breakfast  predniSONE   Tablet 10 milliGRAM(s) Oral daily  senna 2 Tablet(s) Oral at bedtime  simvastatin 10 milliGRAM(s) Oral at bedtime  sodium chloride 0.9%. 1000 milliLiter(s) (100 mL/Hr) IV Continuous <Continuous>  tiotropium 2.5 MICROgram(s) Inhaler 2 Puff(s) Inhalation daily    MEDICATIONS  (PRN):  acetaminophen     Tablet .. 650 milliGRAM(s) Oral every 6 hours PRN Mild Pain (1 - 3), Moderate Pain (4 - 6)  oxyCODONE    IR 5 milliGRAM(s) Oral every 6 hours PRN Severe Pain (7 - 10)      Allergies    No Known Allergies    Intolerances        LABS:                        7.4    6.41  )-----------( 187      ( 18 Sep 2023 05:35 )             22.4     09-18    140  |  112<H>  |  38<H>  ----------------------------<  96  4.6   |  25  |  1.14    Ca    8.0<L>      18 Sep 2023 05:35        Urinalysis Basic - ( 18 Sep 2023 05:35 )    Color: x / Appearance: x / SG: x / pH: x  Gluc: 96 mg/dL / Ketone: x  / Bili: x / Urobili: x   Blood: x / Protein: x / Nitrite: x   Leuk Esterase: x / RBC: x / WBC x   Sq Epi: x / Non Sq Epi: x / Bacteria: x        RADIOLOGY & ADDITIONAL TESTS:  Studies reviewed.    ASSESSMENT & PLAN:

## 2023-09-18 NOTE — CHART NOTE - NSCHARTNOTEFT_GEN_A_CORE
Pt needs an IV placed for CT with IV contrast to r/o active bleed into R thigh, Pt has a med port in place that is not compatible with IV contrast. Nursing on the floor have tried IV placement multiple times without success. Spoke to medicine PA at pager [phone] who will place IV.

## 2023-09-18 NOTE — PROGRESS NOTE ADULT - SUBJECTIVE AND OBJECTIVE BOX
Patient is a 82y old  Female who presents with a chief complaint of Abnormal lab values - preoperative (18 Sep 2023 08:30)      INTERVAL HPI/OVERNIGHT EVENTS:  Pt was seen and examined, no acute events.      MEDICATIONS  (STANDING):  aMIOdarone    Tablet 100 milliGRAM(s) Oral daily  budesonide  80 MICROgram(s)/formoterol 4.5 MICROgram(s) Inhaler 2 Puff(s) Inhalation two times a day  folic acid 1 milliGRAM(s) Oral daily  gabapentin 400 milliGRAM(s) Oral two times a day  influenza  Vaccine (HIGH DOSE) 0.7 milliLiter(s) IntraMuscular once  midodrine. 10 milliGRAM(s) Oral three times a day  montelukast 10 milliGRAM(s) Oral at bedtime  pantoprazole    Tablet 40 milliGRAM(s) Oral before breakfast  predniSONE   Tablet 10 milliGRAM(s) Oral daily  senna 2 Tablet(s) Oral at bedtime  simvastatin 10 milliGRAM(s) Oral at bedtime  sodium chloride 0.9%. 1000 milliLiter(s) (100 mL/Hr) IV Continuous <Continuous>  tiotropium 2.5 MICROgram(s) Inhaler 2 Puff(s) Inhalation daily    MEDICATIONS  (PRN):  acetaminophen     Tablet .. 650 milliGRAM(s) Oral every 6 hours PRN Mild Pain (1 - 3), Moderate Pain (4 - 6)  oxyCODONE    IR 5 milliGRAM(s) Oral every 6 hours PRN Severe Pain (7 - 10)      Allergies  No Known Allergies        Vital Signs Last 24 Hrs  T(C): 36.6 (18 Sep 2023 11:32), Max: 36.8 (18 Sep 2023 05:05)  T(F): 97.8 (18 Sep 2023 11:32), Max: 98.2 (18 Sep 2023 05:05)  HR: 75 (18 Sep 2023 11:32) (60 - 75)  BP: 119/56 (18 Sep 2023 11:32) (105/65 - 119/56)  BP(mean): --  RR: 18 (18 Sep 2023 11:32) (17 - 18)  SpO2: 97% (18 Sep 2023 11:32) (96% - 98%)    Parameters below as of 18 Sep 2023 11:32  Patient On (Oxygen Delivery Method): room air        PHYSICAL EXAM:  GENERAL: NAD  HEAD:  Atraumatic  EYES: PERRLA  ENMT: Mouth moist   NECK: Supple  NERVOUS SYSTEM:  awake, alert, non focal  CHEST/LUNG: Clear  HEART: RRR, S1, S2  ABDOMEN: Soft, non tender  EXTREMITIES:  no edema BL LE, rt thigh swelling, + tenderness  SKIN: No rash        LABS:                        7.4    6.41  )-----------( 187      ( 18 Sep 2023 05:35 )             22.4     09-18    140  |  112<H>  |  38<H>  ----------------------------<  96  4.6   |  25  |  1.14    Ca    8.0<L>      18 Sep 2023 05:35        Urinalysis Basic - ( 18 Sep 2023 05:35 )    Color: x / Appearance: x / SG: x / pH: x  Gluc: 96 mg/dL / Ketone: x  / Bili: x / Urobili: x   Blood: x / Protein: x / Nitrite: x   Leuk Esterase: x / RBC: x / WBC x   Sq Epi: x / Non Sq Epi: x / Bacteria: x      CAPILLARY BLOOD GLUCOSE          RADIOLOGY & ADDITIONAL TESTS:    Imaging Personally Reviewed:  [ ] YES  [ ] NO    Consultant(s) Notes Reviewed:  [ ] YES  [ ] NO    Care Discussed with Consultants/Other Providers [ ] YES  [ ] NO

## 2023-09-18 NOTE — PROGRESS NOTE ADULT - ASSESSMENT
82 years old female with h/o Afib, PMR, aplastic anemia, HLD, orthostatic hypotension on midodrine present to ED with complain of low Hb from preop. Patient reported generalized weakness and lightheadedness. Denied any chest pain or SOB. No vomiting of blood or rectal bleed. Patient reported stool slightly dark as she took pepto bismol recently. Last transfusion was around 04/2023. Currently coumadin on hold and on lovenox. Patient does noted slight right thigh pain and swelling for last day or so.   Hemodynamically stable, afebrile, sat well at RA. No leukocytosis, Hb 6.9 ( 9.1 9/7/23), plt 289, K 4.3, Cr 1.35    Acute blood loss anemia/ H/O aplastic anemia:  - Symptomatic on admission   - Brought in from preop for low Hb, dizziness, generalized weakness  - Last transfusion on 04/2023, following with outpatient hematology, getting procrit, IVIG  - Large right thigh hematoma noted in CT   - S/P 2 unit of PRBC  - Hematology consult appreciated   - Monitor H/H, transfuse as needed to keep Hb> 7 or if any active or worsening bleeding in repeat CT  - Hold Lovenox for now until Hb stable  - Ortho / gen sx follow up regarding hematoma, follow up repeat CT    PMR (polymyalgia rheumatica):  - H/O PMR on steroid since around 05/2023, gradually tapering down  - Currently on prednisone 10mg daily    Low back pain:  - S/P kyphoplasty by ortho team  - Pain control  - Continue gabapentin.    Paroxysmal atrial fibrillation:  - S/P ablation, following with outpatient cardiology, last seen 9/7/23 ( terry ROSENBERG note). per note, had a Ziopatch in 2/2021 that showed symptomatic PACs and PAT episodes. There was no AF seen.  - Was on coumadin  - AC held for large hematoma and anemia, will continue to hold for now, if hb stable can start DVT ppx dose first and reassess before bridging to full dose.      KIMBERLY with hyperkalemia  - Cr improved  - US with no hydro  - IVF, can stop now  - S/P Insulin/ D50 and Lokelma for hyperK, K improved     Hypotension:  - Chronic , stable  - Continue midodrine    Hyperlipidemia:  - On statin.

## 2023-09-19 LAB
ANION GAP SERPL CALC-SCNC: 3 MMOL/L — LOW (ref 5–17)
BUN SERPL-MCNC: 30 MG/DL — HIGH (ref 7–23)
CALCIUM SERPL-MCNC: 8.6 MG/DL — SIGNIFICANT CHANGE UP (ref 8.5–10.1)
CHLORIDE SERPL-SCNC: 115 MMOL/L — HIGH (ref 96–108)
CO2 SERPL-SCNC: 25 MMOL/L — SIGNIFICANT CHANGE UP (ref 22–31)
CREAT SERPL-MCNC: 1.01 MG/DL — SIGNIFICANT CHANGE UP (ref 0.5–1.3)
EGFR: 56 ML/MIN/1.73M2 — LOW
GLUCOSE SERPL-MCNC: 100 MG/DL — HIGH (ref 70–99)
HCT VFR BLD CALC: 23.7 % — LOW (ref 34.5–45)
HGB BLD-MCNC: 7.5 G/DL — LOW (ref 11.5–15.5)
MCHC RBC-ENTMCNC: 29.6 PG — SIGNIFICANT CHANGE UP (ref 27–34)
MCHC RBC-ENTMCNC: 31.6 G/DL — LOW (ref 32–36)
MCV RBC AUTO: 93.7 FL — SIGNIFICANT CHANGE UP (ref 80–100)
NRBC # BLD: 0 /100 WBCS — SIGNIFICANT CHANGE UP (ref 0–0)
PLATELET # BLD AUTO: 240 K/UL — SIGNIFICANT CHANGE UP (ref 150–400)
POTASSIUM SERPL-MCNC: 5 MMOL/L — SIGNIFICANT CHANGE UP (ref 3.5–5.3)
POTASSIUM SERPL-SCNC: 5 MMOL/L — SIGNIFICANT CHANGE UP (ref 3.5–5.3)
RBC # BLD: 2.53 M/UL — LOW (ref 3.8–5.2)
RBC # FLD: 21.2 % — HIGH (ref 10.3–14.5)
SODIUM SERPL-SCNC: 143 MMOL/L — SIGNIFICANT CHANGE UP (ref 135–145)
WBC # BLD: 9.46 K/UL — SIGNIFICANT CHANGE UP (ref 3.8–10.5)
WBC # FLD AUTO: 9.46 K/UL — SIGNIFICANT CHANGE UP (ref 3.8–10.5)

## 2023-09-19 PROCEDURE — 99232 SBSQ HOSP IP/OBS MODERATE 35: CPT

## 2023-09-19 RX ORDER — APIXABAN 2.5 MG/1
2.5 TABLET, FILM COATED ORAL
Refills: 0 | Status: DISCONTINUED | OUTPATIENT
Start: 2023-09-19 | End: 2023-09-20

## 2023-09-19 RX ADMIN — OXYCODONE HYDROCHLORIDE 5 MILLIGRAM(S): 5 TABLET ORAL at 00:49

## 2023-09-19 RX ADMIN — APIXABAN 2.5 MILLIGRAM(S): 2.5 TABLET, FILM COATED ORAL at 17:32

## 2023-09-19 RX ADMIN — BUDESONIDE AND FORMOTEROL FUMARATE DIHYDRATE 2 PUFF(S): 160; 4.5 AEROSOL RESPIRATORY (INHALATION) at 17:31

## 2023-09-19 RX ADMIN — CHLORHEXIDINE GLUCONATE 1 APPLICATION(S): 213 SOLUTION TOPICAL at 11:21

## 2023-09-19 RX ADMIN — AMIODARONE HYDROCHLORIDE 100 MILLIGRAM(S): 400 TABLET ORAL at 06:31

## 2023-09-19 RX ADMIN — Medication 650 MILLIGRAM(S): at 19:24

## 2023-09-19 RX ADMIN — MONTELUKAST 10 MILLIGRAM(S): 4 TABLET, CHEWABLE ORAL at 21:44

## 2023-09-19 RX ADMIN — PANTOPRAZOLE SODIUM 40 MILLIGRAM(S): 20 TABLET, DELAYED RELEASE ORAL at 06:31

## 2023-09-19 RX ADMIN — Medication 650 MILLIGRAM(S): at 20:24

## 2023-09-19 RX ADMIN — GABAPENTIN 400 MILLIGRAM(S): 400 CAPSULE ORAL at 06:32

## 2023-09-19 RX ADMIN — SIMVASTATIN 10 MILLIGRAM(S): 20 TABLET, FILM COATED ORAL at 21:44

## 2023-09-19 RX ADMIN — BUDESONIDE AND FORMOTEROL FUMARATE DIHYDRATE 2 PUFF(S): 160; 4.5 AEROSOL RESPIRATORY (INHALATION) at 06:34

## 2023-09-19 RX ADMIN — Medication 1 MILLIGRAM(S): at 11:19

## 2023-09-19 RX ADMIN — OXYCODONE HYDROCHLORIDE 5 MILLIGRAM(S): 5 TABLET ORAL at 06:31

## 2023-09-19 RX ADMIN — OXYCODONE HYDROCHLORIDE 5 MILLIGRAM(S): 5 TABLET ORAL at 07:26

## 2023-09-19 RX ADMIN — SENNA PLUS 2 TABLET(S): 8.6 TABLET ORAL at 21:44

## 2023-09-19 RX ADMIN — OXYCODONE HYDROCHLORIDE 5 MILLIGRAM(S): 5 TABLET ORAL at 13:57

## 2023-09-19 RX ADMIN — OXYCODONE HYDROCHLORIDE 5 MILLIGRAM(S): 5 TABLET ORAL at 12:45

## 2023-09-19 RX ADMIN — MIDODRINE HYDROCHLORIDE 10 MILLIGRAM(S): 2.5 TABLET ORAL at 11:19

## 2023-09-19 RX ADMIN — TIOTROPIUM BROMIDE 2 PUFF(S): 18 CAPSULE ORAL; RESPIRATORY (INHALATION) at 11:22

## 2023-09-19 RX ADMIN — GABAPENTIN 400 MILLIGRAM(S): 400 CAPSULE ORAL at 17:31

## 2023-09-19 RX ADMIN — Medication 10 MILLIGRAM(S): at 06:31

## 2023-09-19 RX ADMIN — SODIUM CHLORIDE 100 MILLILITER(S): 9 INJECTION INTRAMUSCULAR; INTRAVENOUS; SUBCUTANEOUS at 06:31

## 2023-09-19 NOTE — PHYSICAL THERAPY INITIAL EVALUATION ADULT - PERTINENT HX OF CURRENT PROBLEM, REHAB EVAL
Pt with c/o lumbar spine pain after she fell on her ramp at home. s/p kyphoplasty. c/o weakness in the lower extremities especially the RLE, pain in the lumbar and RLE, difficulty in bed mobility , transfers and ambulation.

## 2023-09-19 NOTE — OCCUPATIONAL THERAPY INITIAL EVALUATION ADULT - RANGE OF MOTION EXAMINATION, LOWER EXTREMITY
RLE AROM hip flexion grossly impaired; RLE AROM knee flexion grossly WFL; RLE AROM distally to knee grossly WFL./Left LE Active ROM was WFL (within functional limits)

## 2023-09-19 NOTE — PHYSICAL THERAPY INITIAL EVALUATION ADULT - ADDITIONAL COMMENTS
pt states she has a cane, rolling walker , commode chair, wheel chair at home but does not use any prior to this hospitalization
98

## 2023-09-19 NOTE — PHYSICAL THERAPY INITIAL EVALUATION ADULT - NSACTIVITYREC_GEN_A_PT
[No Acute Distress] : no acute distress [Well Nourished] : well nourished [Well Developed] : well developed [Well-Appearing] : well-appearing [Normal Sclera/Conjunctiva] : normal sclera/conjunctiva [PERRL] : pupils equal round and reactive to light [EOMI] : extraocular movements intact [Normal Outer Ear/Nose] : the outer ears and nose were normal in appearance [Normal Oropharynx] : the oropharynx was normal [No JVD] : no jugular venous distention OOB to chair daily with assistance. [No Lymphadenopathy] : no lymphadenopathy [Supple] : supple [Thyroid Normal, No Nodules] : the thyroid was normal and there were no nodules present [No Respiratory Distress] : no respiratory distress  [No Accessory Muscle Use] : no accessory muscle use [Clear to Auscultation] : lungs were clear to auscultation bilaterally [Normal Rate] : normal rate  [Regular Rhythm] : with a regular rhythm [Normal S1, S2] : normal S1 and S2 [No Murmur] : no murmur heard [No Carotid Bruits] : no carotid bruits [No Abdominal Bruit] : a ~M bruit was not heard ~T in the abdomen [No Varicosities] : no varicosities [Pedal Pulses Present] : the pedal pulses are present [No Edema] : there was no peripheral edema [No Palpable Aorta] : no palpable aorta [No Extremity Clubbing/Cyanosis] : no extremity clubbing/cyanosis [Soft] : abdomen soft [Non Tender] : non-tender [Non-distended] : non-distended [No Masses] : no abdominal mass palpated [No HSM] : no HSM [Normal Bowel Sounds] : normal bowel sounds [Normal Posterior Cervical Nodes] : no posterior cervical lymphadenopathy [Normal Anterior Cervical Nodes] : no anterior cervical lymphadenopathy [No CVA Tenderness] : no CVA  tenderness [No Spinal Tenderness] : no spinal tenderness [No Joint Swelling] : no joint swelling [Grossly Normal Strength/Tone] : grossly normal strength/tone [No Rash] : no rash [Coordination Grossly Intact] : coordination grossly intact [No Focal Deficits] : no focal deficits [Normal Gait] : normal gait [Deep Tendon Reflexes (DTR)] : deep tendon reflexes were 2+ and symmetric [Normal Affect] : the affect was normal [Normal Insight/Judgement] : insight and judgment were intact

## 2023-09-19 NOTE — PROGRESS NOTE ADULT - SUBJECTIVE AND OBJECTIVE BOX
Patient is a 82y old  Female who presents with a chief complaint of Abnormal lab values - preoperative (19 Sep 2023 08:34)      INTERVAL HPI/OVERNIGHT EVENTS:  Pt was seen and examined, no acute events.    MEDICATIONS  (STANDING):  aMIOdarone    Tablet 100 milliGRAM(s) Oral daily  apixaban 2.5 milliGRAM(s) Oral two times a day  budesonide  80 MICROgram(s)/formoterol 4.5 MICROgram(s) Inhaler 2 Puff(s) Inhalation two times a day  chlorhexidine 2% Cloths 1 Application(s) Topical daily  folic acid 1 milliGRAM(s) Oral daily  gabapentin 400 milliGRAM(s) Oral two times a day  influenza  Vaccine (HIGH DOSE) 0.7 milliLiter(s) IntraMuscular once  midodrine. 10 milliGRAM(s) Oral three times a day  montelukast 10 milliGRAM(s) Oral at bedtime  pantoprazole    Tablet 40 milliGRAM(s) Oral before breakfast  predniSONE   Tablet 10 milliGRAM(s) Oral daily  senna 2 Tablet(s) Oral at bedtime  simvastatin 10 milliGRAM(s) Oral at bedtime  sodium chloride 0.9%. 1000 milliLiter(s) (100 mL/Hr) IV Continuous <Continuous>  tiotropium 2.5 MICROgram(s) Inhaler 2 Puff(s) Inhalation daily    MEDICATIONS  (PRN):  acetaminophen     Tablet .. 650 milliGRAM(s) Oral every 6 hours PRN Mild Pain (1 - 3), Moderate Pain (4 - 6)  oxyCODONE    IR 5 milliGRAM(s) Oral every 6 hours PRN Severe Pain (7 - 10)      Allergies    No Known Allergies    Intolerances          Vital Signs Last 24 Hrs  T(C): 36.8 (19 Sep 2023 11:15), Max: 37 (19 Sep 2023 05:11)  T(F): 98.3 (19 Sep 2023 11:15), Max: 98.6 (19 Sep 2023 05:11)  HR: 95 (19 Sep 2023 11:15) (71 - 95)  BP: 112/61 (19 Sep 2023 11:15) (112/61 - 139/78)  BP(mean): --  RR: 18 (19 Sep 2023 11:15) (18 - 18)  SpO2: 93% (19 Sep 2023 11:15) (90% - 93%)    Parameters below as of 19 Sep 2023 11:15  Patient On (Oxygen Delivery Method): room air        PHYSICAL EXAM:  GENERAL: NAD  HEAD:  Atraumatic  EYES: PERRLA  ENMT: Mouth moist   NECK: Supple  NERVOUS SYSTEM:  awake, alert, non focal  CHEST/LUNG: Clear  HEART: RRR, S1, S2  ABDOMEN: Soft, non tender  EXTREMITIES:  no edema BL LE, rt thigh swelling, + tenderness  SKIN: No rash      LABS:                        7.5    9.46  )-----------( 240      ( 19 Sep 2023 07:15 )             23.7     09-19    143  |  115<H>  |  30<H>  ----------------------------<  100<H>  5.0   |  25  |  1.01    Ca    8.6      19 Sep 2023 07:15        Urinalysis Basic - ( 19 Sep 2023 07:15 )    Color: x / Appearance: x / SG: x / pH: x  Gluc: 100 mg/dL / Ketone: x  / Bili: x / Urobili: x   Blood: x / Protein: x / Nitrite: x   Leuk Esterase: x / RBC: x / WBC x   Sq Epi: x / Non Sq Epi: x / Bacteria: x      CAPILLARY BLOOD GLUCOSE          RADIOLOGY & ADDITIONAL TESTS:    Imaging Personally Reviewed:  [ ] YES  [ ] NO    Consultant(s) Notes Reviewed:  [ ] YES  [ ] NO    Care Discussed with Consultants/Other Providers [ ] YES  [ ] NO

## 2023-09-19 NOTE — PHYSICAL THERAPY INITIAL EVALUATION ADULT - NSPTDISCHREC_GEN_A_CORE
due to strength deficits in the LE, difficulty in bed mobility, transfers and ambulation, fall risk/Sub-acute Rehab

## 2023-09-19 NOTE — OCCUPATIONAL THERAPY INITIAL EVALUATION ADULT - PERTINENT HX OF CURRENT PROBLEM, REHAB EVAL
82 years old female with h/o Afib, PMR, aplastic anemia, HLD, orthostatic hypotension on midodrine present to ED with complain of low Hb from preop. Patient reported generalized weakness and lightheadedness. Denied any chest pain or SOB. No vomiting of blood or rectal bleed. Patient reported stool slightly dark as she took pepto bismol recently. Last transfusion was around 04/2023. Currently coumadin on hold and on lovenox. Patient does noted slight right thigh pain and swelling for last day or so. Pt s/p POD3 L2 kyphoplasty.

## 2023-09-19 NOTE — PROGRESS NOTE ADULT - SUBJECTIVE AND OBJECTIVE BOX
Heme/Onc Progress note    INTERVAL HPI/OVERNIGHT EVENTS:  Patient S&E at bedside. No o/n events, still c/o RLE pain, stated back is much improved.   VITAL SIGNS:  T(F): 98.6 (09-19-23 @ 05:11)  HR: 88 (09-19-23 @ 05:11)  BP: 139/78 (09-19-23 @ 05:11)  RR: 18 (09-19-23 @ 05:11)  SpO2: 90% (09-19-23 @ 05:11)  Wt(kg): --    PHYSICAL EXAM:    Constitutional: NAD, some RLE pain  Eyes: EOMI, sclera non-icteric  Neck: supple, no masses, no JVD  Respiratory: CTA b/l, good air entry b/l  Cardiovascular: RRR, no M/R/G  Gastrointestinal: soft, NTND, no masses palpable, + BS,  Extremities: some edema noted in RLE thigh, no c/c/e  Neurological: AAOx3      MEDICATIONS  (STANDING):  aMIOdarone    Tablet 100 milliGRAM(s) Oral daily  budesonide  80 MICROgram(s)/formoterol 4.5 MICROgram(s) Inhaler 2 Puff(s) Inhalation two times a day  chlorhexidine 2% Cloths 1 Application(s) Topical daily  folic acid 1 milliGRAM(s) Oral daily  gabapentin 400 milliGRAM(s) Oral two times a day  influenza  Vaccine (HIGH DOSE) 0.7 milliLiter(s) IntraMuscular once  midodrine. 10 milliGRAM(s) Oral three times a day  montelukast 10 milliGRAM(s) Oral at bedtime  pantoprazole    Tablet 40 milliGRAM(s) Oral before breakfast  predniSONE   Tablet 10 milliGRAM(s) Oral daily  senna 2 Tablet(s) Oral at bedtime  simvastatin 10 milliGRAM(s) Oral at bedtime  sodium chloride 0.9%. 1000 milliLiter(s) (100 mL/Hr) IV Continuous <Continuous>  tiotropium 2.5 MICROgram(s) Inhaler 2 Puff(s) Inhalation daily    MEDICATIONS  (PRN):  acetaminophen     Tablet .. 650 milliGRAM(s) Oral every 6 hours PRN Mild Pain (1 - 3), Moderate Pain (4 - 6)  oxyCODONE    IR 5 milliGRAM(s) Oral every 6 hours PRN Severe Pain (7 - 10)      Allergies    No Known Allergies    Intolerances        LABS:                        7.5    9.46  )-----------( 240      ( 19 Sep 2023 07:15 )             23.7     09-19    143  |  115<H>  |  30<H>  ----------------------------<  100<H>  5.0   |  25  |  1.01    Ca    8.6      19 Sep 2023 07:15        Urinalysis Basic - ( 19 Sep 2023 07:15 )    Color: x / Appearance: x / SG: x / pH: x  Gluc: 100 mg/dL / Ketone: x  / Bili: x / Urobili: x   Blood: x / Protein: x / Nitrite: x   Leuk Esterase: x / RBC: x / WBC x   Sq Epi: x / Non Sq Epi: x / Bacteria: x    Flow Cytometry . (09.15.23 @ 00:31)   TM Interpretation:   Flow Cytometry Final Report   ________________________________________________________________________   Specimen: Peripheral Blood   Date Collected: 09/15/2023   Date Received: 09/15/2023   Date Processed: 09/15/2023 11:40   Date Reported: 09/18/2023 18:17   Accession #: [id number]   Surgical Pathology Number:   ________________________________________________________________________   CLINICAL DATA: History of myelodysplastic syndrome, anemia   ________________________________________________________________________   CD45/Side Scatter Differential   Granulocytes: 60 % ; Lymphocytes: 14 % ; Monocytes: 19 % ; Dim CD45 and/or blast gate: 2 %   ; Erythroid/debris: 5 %   ________________________________________________________________________   DIAGNOSIS:   Peripheral blood:   - The lymphocyte immunophenotypic findings show no diagnostic abnormalities.   - The myeloid immunophenotypic findings show decreased myeloid granularity and increased   proportion of monocytes with aberrant expression of CD56. Clinical correlation is suggested.   Further hematologic evaluation is suggested if clinically indicated.   Please see interpretation.   INTERPRETATION:   MORPHOLOGY: Blood smear shows increased proportion of monocytes and some neutrophils with   hypogranular cytoplasm.   IMMUNOPHENOTYPE:   Lymphocytes (14% of cells): Heterogeneous population of T-cells (with normal CD4 to CD8 ratio),   increased proportion of natural killer cells (6.2% of total cells), and polytypic B-cells.   CD45/side scatter shows no blast population and decreased myeloid granularity. There is no increase   in CD34, CD14/CD64 or  positive cells. Myeloid antigen pattern is normal with CD13, CD16,   CD11b, CD15, and CD33. There is increased proportion of monocytes with aberrant expression of CD56.   __   ______________________________________________________________________   Viability ................. 100 %   _   The following markers were assessed:   CD2,CD3,CD5,CD7,CD4,CD8,CD16,CD56,CD57,CD10,CD19,CD20,CD23,CD14,CD64,HLA-DR,CD34,,CD11b,CD13,CD   15,CD33,CD38,,Kappa,Lambda,CD45.   Verified By: Viktoria Pedersen MD, MD           RADIOLOGY & ADDITIONAL TESTS:  Studies reviewed.    ASSESSMENT & PLAN:     < from: CT Lower Extremity w/ IV Cont, Right (09.18.23 @ 16:55) >  ACC: [id number] EXAM:  CT LWR EXT IC RT   ORDERED BY: NAMAN DON     PROCEDURE DATE:  09/18/2023          INTERPRETATION:  Clinical information: Rule out active bleed right thigh.    Comparison: None.    Technique:  CT scan of the right lowerextremity from the right iliac crest to the   toes.  Intravenous contrast: 90 cc of Omnipaque 350 were administered and 10 cc   were discarded.    Findings:    Evaluation for an active bleed is limited without precontrast and delayed   images. Streakartifact from the hardware also limits evaluation. There   is enlargement and heterogeneously increased density within the sartorius   muscle and the adductor longus and brevis muscles which is consistent   with hematoma. There is no definite active extravasation identified.    There is a small knee joint effusion. There is diffuse subcutaneous edema   which is nonspecific there is reticular change in the lateral   subcutaneous tissues at the level of the hip consistent with previous   surgery.    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 There is also   avascular necrosis of the mostly imaged left femoral head with trace   collapse.    There are degenerative changes of the partially imaged lower lumbar   spine. There is mild spurring at the quadriceps insertion and patellar   tendon origin. There is spurring at the Achilles insertion.    Status post hysterectomy.    Impression: Limited evaluation. Hematoma within the sartorius and   adductor longus and brevis muscles. No definite active extravasation   identified.    --- End of Report ---

## 2023-09-19 NOTE — PHYSICAL THERAPY INITIAL EVALUATION ADULT - LEVEL OF INDEPENDENCE, REHAB EVAL
End of Shift Note    Bedside shift change report given to Araceli (oncoming nurse) by George Ham (offgoing nurse). Report included the following information SBAR    Shift worked:  [id number]     Shift summary and any significant changes:     Pt did not have any significant changes during shift. Pt vitals and labs at patient baseline. Pt 0300 BP was 87/54, however patient asymptomatic. Pt troponin 1.36 this morning. Pt complained of shoulder pain and was given PRN savage once. Pt IV was painful with infusion, inserted new line. Pt used suction for sputum during shift. Incontinence and catheter care completed during shift. Dressings on buttocks changed during shift. Pt NPO at midnight and tolerating. Pt rested quietly during shift. Concerns for physician to address:     Zone phone for oncoming shift:   [phone]       Activity:  Activity Level: Bed Rest  Number times ambulated in hallways past shift: 0  Number of times OOB to chair past shift: 0    Cardiac:   Cardiac Monitoring: Yes      Cardiac Rhythm: Normal sinus rhythm    Access:   Current line(s): PIV     Genitourinary:   Urinary status: dao    Respiratory:   O2 Device: Nasal cannula  Chronic home O2 use?: YES  Incentive spirometer at bedside: YES     GI:  Last Bowel Movement Date: 03/01/21  Current diet:  DIET NUTRITIONAL SUPPLEMENTS Lunch, Dinner; Magic Cups  DIET NUTRITIONAL SUPPLEMENTS Breakfast, Lunch; Ensure Clear  DIET NPO With Meds  Passing flatus: YES  Tolerating current diet: YES  % Diet Eaten: 80 %    Pain Management:   Patient states pain is manageable on current regimen: YES    Skin:  Hernesto Score: 13  Interventions: Turning and repositioning    Patient Safety:  Fall Score:  Total Score: 2  Interventions: bed/chair alarm  High Fall Risk: Yes    Length of Stay:  Expected LOS: 4d 19h  Actual LOS: [address] [facility] moderate assist (50% patients effort)

## 2023-09-19 NOTE — PROGRESS NOTE ADULT - SUBJECTIVE AND OBJECTIVE BOX
Patient seen and examined at bedside. Patient complains of no back pain. Patient endorses pain in right thigh from hematoma and inability to move her leg much. Patient says that she was able to  CT yesterday but did not work with PT yesterday. Patient remains adamant on not taking lovenox at this time, heme/onc aware. Will be given pain medication prior to PT today to help participate. Patient denies fevers, chills, saddle anesthesia, bowel or bladder incontinence, leg pain, numbness, weakness, or any other orthopaedic complaint.     Vital Signs (24 Hrs):  T(C): 37 (09-19-23 @ 05:11), Max: 37 (09-19-23 @ 05:11)  HR: 88 (09-19-23 @ 05:11) (71 - 88)  BP: 139/78 (09-19-23 @ 05:11) (119/56 - 139/78)  RR: 18 (09-19-23 @ 05:11) (18 - 18)  SpO2: 90% (09-19-23 @ 05:11) (90% - 97%)  Wt(kg): --    LABS:                          7.4    6.41  )-----------( 187      ( 18 Sep 2023 05:35 )             22.4     09-18    140  |  112<H>  |  38<H>  ----------------------------<  96  4.6   |  25  |  1.14    Ca    8.0<L>      18 Sep 2023 05:35      RLE: TTP at groin area, swelling, no erythema or discoloration, sensation intact    Spine:  Dressing in place, c/d/i.   Minimal TTP at incision site; NTTP throughout the rest of the extremity.   Negative Lim, Negative Babinski, Negative myoclonus bilaterally  Radial, DP pulses palpable.  No calf tenderness bilaterally.  Compartments soft and compressible.     Motor:                   Elbow Flex     Wrist Ext      Elbow Ext      Finger Flex     Finger Abd               R                   5/5                 5/5                 5/5                  5/5                 5/5  L                    5/5                 5/5                 5/5                  5/5                 5/5              Hip Flex     Knee Ext     Ankle Dorsi     Hallux Ext     Ankle Plant  R            3/5*              3/5*               5/5                    5/5                5/5  L             5/5             5/5                5/5                   5/5                 5/5  *limited secondary to right thigh hematoma pain    Sensory:            C5         C6         C7      C8       T1        (0=absent, 1=impaired, 2=normal, NT=not testable)  R         2            2           2        2         2  L          2            2           2        2         2               L2          L3         L4      L5       S1         (0=absent, 1=impaired, 2=normal, NT=not testable)  R         2            2            2        2        2  L          2            2           2        2         2               Assessment and Plan:  82y Female POD3 L2 kyphoplasty    Appreciate medical recs  Hgb 8, no transfusion at this time  WBAT/PT/OT daily, pre-medication with pain medications for PT participation  Pain management PRN  DVT PPx: to restart today on POD2 per Dr. Chambers, will consider Heme/Onc recommendations on bridging back to home coumadin  Patient remains adamant on not taking Lovenox, Heme/Onc to provide recommendations on bridge to coumadin today.   Incentive spirometry  Dispo: pending recommendations per PT reevaluation  Will discuss with Dr. Chambers and advise of any changes to the plan.

## 2023-09-19 NOTE — PHYSICAL THERAPY INITIAL EVALUATION ADULT - STRENGTHENING, PT EVAL
Improve strength in the both LE to 5/5, improve gen. endurance to good  and be able to perform functional tasks-bed mobility, sitting, standing, transfers and ambulate in a safe manner with or without  assistive device and prevent falls.

## 2023-09-19 NOTE — PROGRESS NOTE ADULT - NS ATTEND AMEND GEN_ALL_CORE FT
pt seen and examined by ACP. Chart reviewed and case d/w ACP with agreement of her A/P. 81 y/o female with afib was on coumadin but currently on lovenox, ?aplastic anemia/MDS, copd, PMR, htn sent in by preop testing for her kyphoplasty procedure due to hypotension. Pt states she went to see her hema/oncologist and was found significant worsening anemia. patient has hx of chronic anemia stated she is seen by Dr. Hari aPrker [phone] for ? aplastic anemia, ?  myelodysplasia and IgG subclass def. being tx w. weekly procrit inj and, IVIG transfusion 1x/month. now acute worsening with Hb 9 down to 6.9. CT negative showed LE hematoma. pt has no prior BM biopsy. GI workup uptodate with polyps and prior gastric ulcer but no malignancy. pt refused to have BM biopsy at this admission due to her back pain. anemia workup and flowcytometry sent. RBC transfusion to keep Hb > 7. INR normalized and Hb stable at  8.7. No objection for patient to proceed kyphoplasty but monitoring CBC closely.  No objection for d/c plan if primary team cleared her.  pt will be followed with her own hematologist after D/c                 8.7    7.13  )-----------( 262      ( 15 Sep 2023 06:02 )               25.8     MEDICATIONS  (STANDING):  aMIOdarone    Tablet 100 milliGRAM(s) Oral daily  budesonide  80 MICROgram(s)/formoterol 4.5 MICROgram(s) Inhaler 2 Puff(s) Inhalation two times a day  folic acid 1 milliGRAM(s) Oral daily  gabapentin 400 milliGRAM(s) Oral two times a day  influenza  Vaccine (HIGH DOSE) 0.7 milliLiter(s) IntraMuscular once  lactated ringers. 1000 milliLiter(s) (50 mL/Hr) IV Continuous <Continuous>  midodrine. 10 milliGRAM(s) Oral three times a day  montelukast 10 milliGRAM(s) Oral at bedtime  pantoprazole    Tablet 40 milliGRAM(s) Oral before breakfast  predniSONE   Tablet 10 milliGRAM(s) Oral daily  senna 2 Tablet(s) Oral at bedtime  simvastatin 10 milliGRAM(s) Oral at bedtime  tiotropium 2.5 MICROgram(s) Inhaler 2 Puff(s) Inhalation daily    MEDICATIONS  (PRN):  acetaminophen     Tablet .. 650 milliGRAM(s) Oral every 6 hours PRN Mild Pain (1 - 3), Moderate Pain (4 - 6)  morphine  - Injectable 2 milliGRAM(s) IV Push every 6 hours PRN breakthrough pain  oxyCODONE    IR 5 milliGRAM(s) Oral every 6 hours PRN Severe Pain (7 - 10)
pt seen and examined by ACP. Chart reviewed and case d/w ACP with agreement of her A/P. 83 y/o female with afib was on coumadin but currently on lovenox, ?aplastic anemia/MDS, copd, PMR, htn sent in by preop testing for her kyphoplasty procedure due to hypotension. Pt states she went to see her hema/oncologist and was found significant worsening anemia. patient has hx of chronic anemia stated she is seen by Dr. Hari Parker [phone] for ? aplastic anemia, ?  myelodysplasia and IgG subclass def. being tx w. weekly procrit inj and, IVIG transfusion 1x/month. now acute worsening with Hb 9 down to 6.9. CT negative showed LE hematoma. pt has no prior BM biopsy. GI workup uptodate with polyps and prior gastric ulcer but no malignancy. pt refused to have BM biopsy at this admission due to her back pain. anemia workup and flowcytometry pending.  RBC transfusion to keep Hb > 7. INR normalized and Hb decrease to 7 and s/p RBC and decreased to 7.4 again. s/p kyphoplasty but monitoring CBC closely.  repeat CT before starting AC. No objection for d/c plan if primary team cleared her.  pt will be followed with her own hematologist after D/c.                         7.4    6.41  )-----------( 187      ( 18 Sep 2023 05:35 )             22.4   MEDICATIONS  (STANDING):  aMIOdarone    Tablet 100 milliGRAM(s) Oral daily  budesonide  80 MICROgram(s)/formoterol 4.5 MICROgram(s) Inhaler 2 Puff(s) Inhalation two times a day  folic acid 1 milliGRAM(s) Oral daily  gabapentin 400 milliGRAM(s) Oral two times a day  influenza  Vaccine (HIGH DOSE) 0.7 milliLiter(s) IntraMuscular once  midodrine. 10 milliGRAM(s) Oral three times a day  montelukast 10 milliGRAM(s) Oral at bedtime  pantoprazole    Tablet 40 milliGRAM(s) Oral before breakfast  predniSONE   Tablet 10 milliGRAM(s) Oral daily  senna 2 Tablet(s) Oral at bedtime  simvastatin 10 milliGRAM(s) Oral at bedtime  sodium chloride 0.9%. 1000 milliLiter(s) (100 mL/Hr) IV Continuous <Continuous>  tiotropium 2.5 MICROgram(s) Inhaler 2 Puff(s) Inhalation daily    MEDICATIONS  (PRN):  acetaminophen     Tablet .. 650 milliGRAM(s) Oral every 6 hours PRN Mild Pain (1 - 3), Moderate Pain (4 - 6)  oxyCODONE    IR 5 milliGRAM(s) Oral every 6 hours PRN Severe Pain (7 - 10)
pt seen and examined by ACP. Chart reviewed and case d/w ACP with agreement of her A/P. 81 y/o female with afib was on coumadin but currently on lovenox, ?aplastic anemia/MDS, copd, PMR, htn sent in by preop testing for her kyphoplasty procedure due to hypotension. Pt states she went to see her hema/oncologist and was found significant worsening anemia. patient has hx of chronic anemia stated she is seen by Dr. Hari Parker [phone] for ? aplastic anemia, ?  myelodysplasia and IgG subclass def. being tx w. weekly procrit inj and, IVIG transfusion 1x/month. now acute worsening with Hb 9 down to 6.9. CT negative showed LE hematoma. pt has no prior BM biopsy. GI workup uptodate with polyps and prior gastric ulcer but no malignancy. pt refused to have BM biopsy at this admission due to her back pain. anemia workup and flowcytometry pending.  RBC transfusion to keep Hb > 7. INR normalized and Hb decrease to 7 and s/p RBC and decreased to 7.4 again. s/p kyphoplasty but monitoring CBC closely. repeated CT of LE stable hematoma. Hb stable at 7.5 d/w pt's hematology and agreed on eliquis 2.5 bid.  No objection for d/c plan if primary team cleared her.  pt will be followed with her own hematologist after D/c. will not active f/u pt and please call for new question

## 2023-09-19 NOTE — PROGRESS NOTE ADULT - PROBLEM SELECTOR PROBLEM 2
PMR (polymyalgia rheumatica)

## 2023-09-19 NOTE — OCCUPATIONAL THERAPY INITIAL EVALUATION ADULT - GENERAL OBSERVATIONS, REHAB EVAL
Pt was encountered supine in bed; NAD, PIV +, primafit +, BLE pneumatic pumps +, S/P L2 kyphoplasty POD 3, spinal precautions, lumbar dressing clean, dry and intact, BLE WBAT, alert, cooperative, followed commands; pt c/o pain in R hip which limits pt performance with functional ADL's/transfers and mobility. PT Adonis present.

## 2023-09-19 NOTE — PROGRESS NOTE ADULT - ASSESSMENT
incomplete   83 y/o female with afib was on coumadin but currently on lovenox, aplastic anemia, copd, PMR, htn sent in by preop testing (kyphoplasty) for low bp and low hemoglobin. Pt states she went to see her oncologist , had blood work done showing hgb 7.9 and was told to come to the ED for blood transfusion.    #Macrocytic anemia   -patient has hx of chronic anemia stated she is seen by Dr. Hari Parker [phone] for aplastic anemia, myelodysplasia and IgG subclass def. being tx w. weekly procrit inj and, IVIG transfusion 1x/month  -as per patient no BM BX ever done-declined to get one this hosp stay r/t increased back pain.   -patient baseline hgb around 9  -on 9/7 hgb 9.1 when presented to ER (9/14) hgb 6.9  -no large ecchymotic areas noted, but tenderness and edema noted in R upper thigh/groin area   -manual smear noted for smudge cells, and basophilic stippling- no note of schistocytes    -US RLE(8/19) showing No evidence of right lower extremity deep venous thrombosis, Large right thigh hematoma.  -CT A/p and CT RLE- showing Enlargement is seen within the adductor muscles roughly spanning 18.2 cm craniocaudad beginning at the level of the pubic symphysis and extending distally with medial femur, Additional enlargement of the vastus medialis spanning roughly 26 cm craniocaudad beginning at the level of the pubic symphysis and extending distally throughout the anterior to medial femur.   -as per patient sustained a fall aug 2023 where she injured her L-spine (was on coumadin at the time) because of severe back pain patient had planned for elective kyphoplasty, stated she has since not fallen or had any traumas since event. To note patient transitioned from coumadin to LMWH in preparation for procedure on Monday 9/11.  -CT RLE repeated (9/18) for continued edema and drops in hgb CT RLE shpwing  Hematoma within the sartorius and adductor longus and brevis muscles. No definite active extravasation identified.  -Tiron-242, TIBC/%sat-unable to quant., ferritin-1342  -Hemolysis (-),willy (-),  , Fol-16.2, TSH-nl  -up to date on colon screenings   -spep/erika- NL, flow noted for increased proportion of monocytes and some neutrophils w/ hypogranular cytoplasm, no blast noted   -patient remains adamant she will not take LMWH  -maintain hgb >7 or if symptomatic   -when patient stabilized for d/c will need to f/u w/ Hematologist Dr. Parker.   -will discuss w/ patient hematologist Dr. Parker and [facility] heme/onc attending Dr garcia for further recs for AC      Will continue to monitor the patient.  Please call with any questions [phone]  Above reviewed with Attending Dr. Garcia  [facility]  [address] [facility], [address], [place], [place]  [phone]  *Note not finalized until signed by Attending Physician    83 y/o female with afib was on coumadin but currently on lovenox, aplastic anemia, copd, PMR, htn sent in by preop testing (kyphoplasty) for low bp and low hemoglobin. Pt states she went to see her oncologist , had blood work done showing hgb 7.9 and was told to come to the ED for blood transfusion.    #Macrocytic anemia   -patient has hx of chronic anemia stated she is seen by Dr. Hari aPrker [phone] for aplastic anemia, myelodysplasia and IgG subclass def. being tx w. weekly procrit inj and, IVIG transfusion 1x/month  -as per patient no BM BX ever done-declined to get one this hosp stay r/t increased back pain.   -patient baseline hgb around 9  -on 9/7 hgb 9.1 when presented to ER (9/14) hgb 6.9  -no large ecchymotic areas noted, but tenderness and edema noted in R upper thigh/groin area   -manual smear noted for smudge cells, and basophilic stippling- no note of schistocytes    -US RLE(8/19) showing No evidence of right lower extremity deep venous thrombosis, Large right thigh hematoma.  -CT A/p and CT RLE- showing Enlargement is seen within the adductor muscles roughly spanning 18.2 cm craniocaudad beginning at the level of the pubic symphysis and extending distally with medial femur, Additional enlargement of the vastus medialis spanning roughly 26 cm craniocaudad beginning at the level of the pubic symphysis and extending distally throughout the anterior to medial femur.   -as per patient sustained a fall aug 2023 where she injured her L-spine (was on coumadin at the time) because of severe back pain patient had planned for elective kyphoplasty, stated she has since not fallen or had any traumas since event. To note patient transitioned from coumadin to LMWH in preparation for procedure on Monday 9/11.  -CT RLE repeated (9/18) for continued edema and drops in hgb CT RLE shpwing  Hematoma within the sartorius and adductor longus and brevis muscles. No definite active extravasation identified.  -Tiron-242, TIBC/%sat-unable to quant., ferritin-1342  -Hemolysis (-),willy (-),  , Fol-16.2, TSH-nl  -up to date on colon screenings   -spep/erika- NL, flow noted for increased proportion of monocytes and some neutrophils w/ hypogranular cytoplasm, no blast noted   -patient remains adamant she will not take LMWH  -maintain hgb >7 or if symptomatic   -when patient stabilized for d/c will need to f/u w/ Hematologist Dr. Parker.   -afterdiscussion w/ patient hematologist Dr. Parker and [facility] heme/onc attending Dr garcia since AC for A-fib as long as ok w/ cardiology would rec starting patient on Eliquis 2.5mg BID. (discussed /w patient)   -patient will f/u w/ Dr. Parker 9/27 @1045am      Will continue to monitor the patient.  Please call with any questions [phone]  Above reviewed with Attending Dr. Garcia  [facility]  [address] [facility], [address], [place], [place]  [phone]  *Note not finalized until signed by Attending Physician    83 y/o female with afib was on coumadin but currently on lovenox, aplastic anemia, copd, PMR, htn sent in by preop testing (kyphoplasty) for low bp and low hemoglobin. Pt states she went to see her oncologist , had blood work done showing hgb 7.9 and was told to come to the ED for blood transfusion.    #Macrocytic anemia   -patient has hx of chronic anemia stated she is seen by Dr. Hari Parker [phone] for aplastic anemia, myelodysplasia and IgG subclass def. being tx w. weekly procrit inj and, IVIG transfusion 1x/month  -as per patient no BM BX ever done-declined to get one this hosp stay r/t increased back pain.   -patient baseline hgb around 9  -on 9/7 hgb 9.1 when presented to ER (9/14) hgb 6.9  -no large ecchymotic areas noted, but tenderness and edema noted in R upper thigh/groin area   -manual smear noted for smudge cells, and basophilic stippling- no note of schistocytes    -US RLE(8/19) showing No evidence of right lower extremity deep venous thrombosis, Large right thigh hematoma.  -CT A/p and CT RLE- showing Enlargement is seen within the adductor muscles roughly spanning 18.2 cm craniocaudad beginning at the level of the pubic symphysis and extending distally with medial femur, Additional enlargement of the vastus medialis spanning roughly 26 cm craniocaudad beginning at the level of the pubic symphysis and extending distally throughout the anterior to medial femur.   -as per patient sustained a fall aug 2023 where she injured her L-spine (was on coumadin at the time) because of severe back pain patient had planned for elective kyphoplasty, stated she has since not fallen or had any traumas since event. To note patient transitioned from coumadin to LMWH in preparation for procedure on Monday 9/11.  -CT RLE repeated (9/18) for continued edema and drops in hgb CT RLE shpwing  Hematoma within the sartorius and adductor longus and brevis muscles. No definite active extravasation identified.  -Tiron-242, TIBC/%sat-unable to quant., ferritin-1342  -Hemolysis (-),willy (-),  , Fol-16.2, TSH-nl  -up to date on colon screenings   -spep/erika- NL, flow noted for increased proportion of monocytes and some neutrophils w/ hypogranular cytoplasm, no blast noted   -patient remains adamant she will not take LMWH  -maintain hgb >7 or if symptomatic   -when patient stabilized for d/c will need to f/u w/ Hematologist Dr. Parker.   -afterdiscussion w/ patient hematologist Dr. Parker and [facility] heme/onc attending Dr garcia since AC for A-fib as long as ok w/ cardiology would rec starting patient on Eliquis 2.5mg BID. (discussed /w patient)   -patient will f/u w/ Dr. Parker 9/27 @1045am      Will sign off for now, Please reconsult if needed, Thank You   Please call with any questions [phone]  Above reviewed with Attending Dr. Garcia  [facility]  [address] [facility], [address], [place], [place]  [phone]  *Note not finalized until signed by Attending Physician

## 2023-09-19 NOTE — PHYSICAL THERAPY INITIAL EVALUATION ADULT - DIAGNOSIS, PT EVAL
c/o pain in the lumbar spine and right thigh/leg, difficulty in bed mobility, transfers and ambulation, unsteady gait , fall risk.

## 2023-09-19 NOTE — OCCUPATIONAL THERAPY INITIAL EVALUATION ADULT - ADDITIONAL COMMENTS
Pt lives with spouse and son in a private house with a ramp access to enter. Once inside, the pt main bedroom and bathroom is on that floor when entering. The pts bathroom has a walk in shower stall, comfort height toilet seat and grab bars. The pt ambulates with no device and owns a rolling walker, SAC and multiple wheelchairs.

## 2023-09-19 NOTE — PROGRESS NOTE ADULT - ASSESSMENT
82 years old female with h/o Afib, PMR, aplastic anemia, HLD, orthostatic hypotension on midodrine present to ED with complain of low Hb from preop. Patient reported generalized weakness and lightheadedness. Denied any chest pain or SOB. No vomiting of blood or rectal bleed. Patient reported stool slightly dark as she took pepto bismol recently. Last transfusion was around 04/2023. Currently coumadin on hold and on lovenox. Patient does noted slight right thigh pain and swelling for last day or so.   Hemodynamically stable, afebrile, sat well at RA. No leukocytosis, Hb 6.9 ( 9.1 9/7/23), plt 289, K 4.3, Cr 1.35    Acute blood loss anemia/ H/O aplastic anemia:  - Symptomatic on admission   - Brought in from preop for low Hb, dizziness, generalized weakness  - Last transfusion on 04/2023, following with outpatient hematology, getting procrit, IVIG  - Large right thigh hematoma noted in CT , repeat CT with stable hematoma  - S/P 2 unit of PRBC  - Hematology consult appreciated   - Monitor H/H, transfuse as needed to keep Hb> 7 or if any active  - patient transitioned from coumadin to LMWH in preparation for procedure on Monday 9/11.  - Ortho / gen sx follow up regarding hematoma, repeat CT with stable hematoma  - Agree with starting Eliquis for DVT ppx ( pt has IVC filter), and A fib, closely monitor hb    PMR (polymyalgia rheumatica):  - H/O PMR on steroid since around 05/2023, gradually tapering down  - Currently on prednisone 10mg daily, S/P periop stress steroid     Low back pain:  - S/P kyphoplasty by ortho team  - Pain control  - Continue gabapentin.    Paroxysmal atrial fibrillation:  - S/P ablation, following with outpatient cardiology, last seen 9/7/23 ( terry bustamante). per note, had a Ziopatch in 2/2021 that showed symptomatic PACs and PAT episodes. There was no AF seen.  - Was on coumadin  - patient transitioned from coumadin to LMWH in preparation for procedure on Monday 9/11.  - Agree with starting Eliquis for DVT ppx ( pt has IVC filter), and A fib, closely monitor hb    KIMBERLY with hyperkalemia  - Cr improved  - US with no hydro  - IVF, can stop now  - S/P Insulin/ D50 and Lokelma for hyperK, K improved     Hypotension:  - Chronic , stable  - Continue midodrine    Hyperlipidemia:  - On statin.    DC plan per ortho, PT .

## 2023-09-20 ENCOUNTER — TRANSCRIPTION ENCOUNTER (OUTPATIENT)
Age: 83
End: 2023-09-20

## 2023-09-20 VITALS
OXYGEN SATURATION: 93 % | DIASTOLIC BLOOD PRESSURE: 76 MMHG | TEMPERATURE: 98 F | HEART RATE: 97 BPM | RESPIRATION RATE: 17 BRPM | SYSTOLIC BLOOD PRESSURE: 159 MMHG

## 2023-09-20 LAB
ANION GAP SERPL CALC-SCNC: 5 MMOL/L — SIGNIFICANT CHANGE UP (ref 5–17)
BUN SERPL-MCNC: 24 MG/DL — HIGH (ref 7–23)
CALCIUM SERPL-MCNC: 8.6 MG/DL — SIGNIFICANT CHANGE UP (ref 8.5–10.1)
CHLORIDE SERPL-SCNC: 112 MMOL/L — HIGH (ref 96–108)
CO2 SERPL-SCNC: 24 MMOL/L — SIGNIFICANT CHANGE UP (ref 22–31)
CREAT SERPL-MCNC: 0.75 MG/DL — SIGNIFICANT CHANGE UP (ref 0.5–1.3)
EGFR: 80 ML/MIN/1.73M2 — SIGNIFICANT CHANGE UP
GLUCOSE SERPL-MCNC: 115 MG/DL — HIGH (ref 70–99)
HCT VFR BLD CALC: 23.7 % — LOW (ref 34.5–45)
HGB BLD-MCNC: 7.5 G/DL — LOW (ref 11.5–15.5)
MCHC RBC-ENTMCNC: 30 PG — SIGNIFICANT CHANGE UP (ref 27–34)
MCHC RBC-ENTMCNC: 31.6 G/DL — LOW (ref 32–36)
MCV RBC AUTO: 94.8 FL — SIGNIFICANT CHANGE UP (ref 80–100)
NRBC # BLD: 0 /100 WBCS — SIGNIFICANT CHANGE UP (ref 0–0)
PLATELET # BLD AUTO: 220 K/UL — SIGNIFICANT CHANGE UP (ref 150–400)
POTASSIUM SERPL-MCNC: 4.4 MMOL/L — SIGNIFICANT CHANGE UP (ref 3.5–5.3)
POTASSIUM SERPL-SCNC: 4.4 MMOL/L — SIGNIFICANT CHANGE UP (ref 3.5–5.3)
RBC # BLD: 2.5 M/UL — LOW (ref 3.8–5.2)
RBC # FLD: 21.2 % — HIGH (ref 10.3–14.5)
SODIUM SERPL-SCNC: 141 MMOL/L — SIGNIFICANT CHANGE UP (ref 135–145)
WBC # BLD: 8.62 K/UL — SIGNIFICANT CHANGE UP (ref 3.8–10.5)
WBC # FLD AUTO: 8.62 K/UL — SIGNIFICANT CHANGE UP (ref 3.8–10.5)

## 2023-09-20 RX ORDER — APIXABAN 2.5 MG/1
1 TABLET, FILM COATED ORAL
Qty: 0 | Refills: 0 | DISCHARGE
Start: 2023-09-20

## 2023-09-20 RX ORDER — SENNA PLUS 8.6 MG/1
2 TABLET ORAL AT BEDTIME
Refills: 0 | Status: DISCONTINUED | OUTPATIENT
Start: 2023-09-20 | End: 2023-09-20

## 2023-09-20 RX ORDER — OXYCODONE HYDROCHLORIDE 5 MG/1
1 TABLET ORAL
Qty: 0 | Refills: 0 | DISCHARGE
Start: 2023-09-20

## 2023-09-20 RX ORDER — SENNA PLUS 8.6 MG/1
2 TABLET ORAL
Qty: 0 | Refills: 0 | DISCHARGE
Start: 2023-09-20

## 2023-09-20 RX ORDER — TIOTROPIUM BROMIDE 18 UG/1
2 CAPSULE ORAL; RESPIRATORY (INHALATION)
Qty: 0 | Refills: 0 | DISCHARGE
Start: 2023-09-20

## 2023-09-20 RX ORDER — BUDESONIDE AND FORMOTEROL FUMARATE DIHYDRATE 160; 4.5 UG/1; UG/1
80 AEROSOL RESPIRATORY (INHALATION)
Qty: 0 | Refills: 0 | DISCHARGE
Start: 2023-09-20

## 2023-09-20 RX ORDER — ACETAMINOPHEN 500 MG
2 TABLET ORAL
Qty: 0 | Refills: 0 | DISCHARGE
Start: 2023-09-20

## 2023-09-20 RX ORDER — WARFARIN SODIUM 2.5 MG/1
1 TABLET ORAL
Qty: 0 | Refills: 0 | DISCHARGE

## 2023-09-20 RX ADMIN — CHLORHEXIDINE GLUCONATE 1 APPLICATION(S): 213 SOLUTION TOPICAL at 12:40

## 2023-09-20 RX ADMIN — Medication 650 MILLIGRAM(S): at 09:13

## 2023-09-20 RX ADMIN — MIDODRINE HYDROCHLORIDE 10 MILLIGRAM(S): 2.5 TABLET ORAL at 05:32

## 2023-09-20 RX ADMIN — PANTOPRAZOLE SODIUM 40 MILLIGRAM(S): 20 TABLET, DELAYED RELEASE ORAL at 05:33

## 2023-09-20 RX ADMIN — SODIUM CHLORIDE 100 MILLILITER(S): 9 INJECTION INTRAMUSCULAR; INTRAVENOUS; SUBCUTANEOUS at 05:33

## 2023-09-20 RX ADMIN — GABAPENTIN 400 MILLIGRAM(S): 400 CAPSULE ORAL at 17:47

## 2023-09-20 RX ADMIN — APIXABAN 2.5 MILLIGRAM(S): 2.5 TABLET, FILM COATED ORAL at 05:32

## 2023-09-20 RX ADMIN — BUDESONIDE AND FORMOTEROL FUMARATE DIHYDRATE 2 PUFF(S): 160; 4.5 AEROSOL RESPIRATORY (INHALATION) at 05:31

## 2023-09-20 RX ADMIN — Medication 650 MILLIGRAM(S): at 10:13

## 2023-09-20 RX ADMIN — BUDESONIDE AND FORMOTEROL FUMARATE DIHYDRATE 2 PUFF(S): 160; 4.5 AEROSOL RESPIRATORY (INHALATION) at 17:47

## 2023-09-20 RX ADMIN — Medication 10 MILLIGRAM(S): at 05:32

## 2023-09-20 RX ADMIN — GABAPENTIN 400 MILLIGRAM(S): 400 CAPSULE ORAL at 05:32

## 2023-09-20 RX ADMIN — APIXABAN 2.5 MILLIGRAM(S): 2.5 TABLET, FILM COATED ORAL at 17:47

## 2023-09-20 RX ADMIN — MIDODRINE HYDROCHLORIDE 10 MILLIGRAM(S): 2.5 TABLET ORAL at 12:39

## 2023-09-20 RX ADMIN — Medication 1 MILLIGRAM(S): at 12:39

## 2023-09-20 RX ADMIN — AMIODARONE HYDROCHLORIDE 100 MILLIGRAM(S): 400 TABLET ORAL at 05:32

## 2023-09-20 NOTE — DISCHARGE NOTE NURSING/CASE MANAGEMENT/SOCIAL WORK - NSDCVIVACCINE_GEN_ALL_CORE_FT
Tdap; 23-Aug-2021 22:35; Danii Dubon (RN); [employer]; [id number] (Exp. Date: 01-Oct-2022); IntraMuscular; Deltoid Left.; 0.5 milliLiter(s); VIS (VIS Published: 09-May-2013, VIS Presented: 23-Aug-2021);

## 2023-09-20 NOTE — DISCHARGE NOTE NURSING/CASE MANAGEMENT/SOCIAL WORK - PATIENT PORTAL LINK FT
You can access the FollowMyHealth Patient Portal offered by [facility] by registering at the following website: [email or web address]. By joining [employer]’s FollowMyHealth portal, you will also be able to view your health information using other applications (apps) compatible with our system.

## 2023-09-20 NOTE — PROGRESS NOTE ADULT - REASON FOR ADMISSION
Abnormal lab values - preoperative

## 2023-09-20 NOTE — PROGRESS NOTE ADULT - SUBJECTIVE AND OBJECTIVE BOX
Patient seen and examined at bedside. Patient complains of no back pain. Patient endorses pain in right thigh from hematoma, endorsing improvement. Patient denies fevers, chills, saddle anesthesia, bowel or bladder incontinence, leg pain, numbness, weakness, or any other orthopaedic complaint. Heme/onc recommending eliquis.     Vital Signs Last 24 Hrs  T(C): 36.9 (20 Sep 2023 05:07), Max: 37.3 (19 Sep 2023 16:45)  T(F): 98.4 (20 Sep 2023 05:07), Max: 99.1 (19 Sep 2023 16:45)  HR: 84 (20 Sep 2023 05:07) (84 - 96)  BP: 120/63 (20 Sep 2023 05:07) (112/61 - 137/79)  BP(mean): --  RR: 18 (20 Sep 2023 05:07) (17 - 18)  SpO2: 95% (20 Sep 2023 05:07) (92% - 97%)    Parameters below as of 20 Sep 2023 05:07  Patient On (Oxygen Delivery Method): nasal cannula                            7.5    9.46  )-----------( 240      ( 19 Sep 2023 07:15 )             23.7       09-19    143  |  115<H>  |  30<H>  ----------------------------<  100<H>  5.0   |  25  |  1.01    Ca    8.6      19 Sep 2023 07:15            RLE: TTP at R thigh, mild to moderate swelling, no erythema or discoloration, sensation intact    Spine:  Dressing in place, c/d/i.     Minimal TTP at incision site; NTTP throughout the rest of the extremity.   Negative Lim, Negative Babinski, Negative myoclonus bilaterally  Radial, DP pulses palpable.  No calf tenderness bilaterally.  Compartments soft and compressible.     Motor:                   Elbow Flex     Wrist Ext      Elbow Ext      Finger Flex     Finger Abd               R                   5/5                 5/5                 5/5                  5/5                 5/5  L                    5/5                 5/5                 5/5                  5/5                 5/5              Hip Flex     Knee Ext     Ankle Dorsi     Hallux Ext     Ankle Plant  R            3/5*              3/5*               5/5                    5/5                5/5  L             4/5             5/5                5/5                   5/5                 5/5  *limited secondary to right thigh hematoma pain    Sensory:            C5         C6         C7      C8       T1        (0=absent, 1=impaired, 2=normal, NT=not testable)  R         2            1           2        2         2  L          2            1           2        2         2               L2          L3         L4      L5       S1         (0=absent, 1=impaired, 2=normal, NT=not testable)  R         2            2            2        2        2  L          2            2           2        2         2               Assessment and Plan:  82y Female POD4 L2 kyphoplasty    Appreciate medical recs  Transfuse >7 per heme/onc  WBAT/PT/OT daily, pre-medication with pain medications for PT participation  Pain management PRN  DVT PPx: eliquis  Dispo: [facility]  Will discuss with Dr. Chambers and advise of any changes to the plan. Patient seen and examined at bedside. Patient complains of no back pain. Patient endorses pain in right thigh from hematoma, endorsing improvement. Patient denies fevers, chills, saddle anesthesia, bowel or bladder incontinence, leg pain, numbness, weakness, or any other orthopaedic complaint. Heme/onc recommending eliquis.     Vital Signs Last 24 Hrs  T(C): 36.9 (20 Sep 2023 05:07), Max: 37.3 (19 Sep 2023 16:45)  T(F): 98.4 (20 Sep 2023 05:07), Max: 99.1 (19 Sep 2023 16:45)  HR: 84 (20 Sep 2023 05:07) (84 - 96)  BP: 120/63 (20 Sep 2023 05:07) (112/61 - 137/79)  BP(mean): --  RR: 18 (20 Sep 2023 05:07) (17 - 18)  SpO2: 95% (20 Sep 2023 05:07) (92% - 97%)    Parameters below as of 20 Sep 2023 05:07  Patient On (Oxygen Delivery Method): nasal cannula                            7.5    9.46  )-----------( 240      ( 19 Sep 2023 07:15 )             23.7       09-19    143  |  115<H>  |  30<H>  ----------------------------<  100<H>  5.0   |  25  |  1.01    Ca    8.6      19 Sep 2023 07:15            RLE: TTP at R thigh, mild to moderate swelling, no erythema or discoloration, sensation intact    Spine:  Dressing in place, c/d/i.     Minimal TTP at incision site; NTTP throughout the rest of the extremity.   Negative Lim, Negative Babinski, Negative myoclonus bilaterally  Radial, DP pulses palpable.  No calf tenderness bilaterally.  Compartments soft and compressible.     Motor:                   Elbow Flex     Wrist Ext      Elbow Ext      Finger Flex     Finger Abd               R                   5/5                 5/5                 5/5                  5/5                 5/5  L                    5/5                 5/5                 5/5                  5/5                 5/5              Hip Flex     Knee Ext     Ankle Dorsi     Hallux Ext     Ankle Plant  R            3/5*              3/5*               5/5                    5/5                5/5  L             4/5             5/5                5/5                   5/5                 5/5  *limited secondary to right thigh hematoma pain    Sensory:            C5         C6         C7      C8       T1        (0=absent, 1=impaired, 2=normal, NT=not testable)  R         2            1           2        2         2  L          2            1           2        2         2               L2          L3         L4      L5       S1         (0=absent, 1=impaired, 2=normal, NT=not testable)  R         2            2            2        2        2  L          2            2           2        2         2               Assessment and Plan:  82y Female POD4 L2 kyphoplasty    Appreciate medical recs  Transfuse >7 per heme/onc  WBAT/PT/OT daily, pre-medication with pain medications for PT participation  Pain management PRN  DVT PPx: eliquis  Dispo: [facility]  ortho stable  Will discuss with Dr. Chambers and advise of any changes to the plan.

## 2023-09-20 NOTE — DISCHARGE NOTE NURSING/CASE MANAGEMENT/SOCIAL WORK - NSDCPEFALRISK_GEN_ALL_CORE
For information on Fall & Injury Prevention, visit: [email or web address] OR  [email or web address] OR  https://www.cdc.gov/steadi/patient.html

## 2023-09-20 NOTE — PROGRESS NOTE ADULT - PROVIDER SPECIALTY LIST ADULT
Orthopedics
Orthopedics
Heme/Onc
Orthopedics
Heme/Onc
Heme/Onc
Hospitalist

## 2023-09-21 ENCOUNTER — APPOINTMENT (OUTPATIENT)
Dept: CARDIOLOGY | Facility: CLINIC | Age: 83
End: 2023-09-21

## 2023-09-21 LAB
ANION GAP SERPL CALC-SCNC: 4 MMOL/L — LOW (ref 5–17)
BUN SERPL-MCNC: 17 MG/DL — SIGNIFICANT CHANGE UP (ref 7–23)
CALCIUM SERPL-MCNC: 9.3 MG/DL — SIGNIFICANT CHANGE UP (ref 8.5–10.1)
CHLORIDE SERPL-SCNC: 108 MMOL/L — SIGNIFICANT CHANGE UP (ref 96–108)
CO2 SERPL-SCNC: 29 MMOL/L — SIGNIFICANT CHANGE UP (ref 22–31)
CREAT SERPL-MCNC: 0.63 MG/DL — SIGNIFICANT CHANGE UP (ref 0.5–1.3)
EGFR: 90 ML/MIN/1.73M2 — SIGNIFICANT CHANGE UP
GLUCOSE SERPL-MCNC: 102 MG/DL — HIGH (ref 70–99)
POTASSIUM SERPL-MCNC: 3.8 MMOL/L — SIGNIFICANT CHANGE UP (ref 3.5–5.3)
POTASSIUM SERPL-SCNC: 3.8 MMOL/L — SIGNIFICANT CHANGE UP (ref 3.5–5.3)
SODIUM SERPL-SCNC: 141 MMOL/L — SIGNIFICANT CHANGE UP (ref 135–145)

## 2023-09-27 ENCOUNTER — APPOINTMENT (OUTPATIENT)
Dept: ORTHOPEDIC SURGERY | Facility: CLINIC | Age: 83
End: 2023-09-27
Payer: MEDICARE

## 2023-09-27 DIAGNOSIS — S32.020D WEDGE COMPRESSION FRACTURE OF SECOND LUMBAR VERTEBRA, SUBSEQUENT ENCOUNTER FOR FRACTURE WITH ROUTINE HEALING: ICD-10-CM

## 2023-09-27 PROCEDURE — 99213 OFFICE O/P EST LOW 20 MIN: CPT

## 2023-09-27 PROCEDURE — 72074 X-RAY EXAM THORAC SPINE4/>VW: CPT | Mod: TC

## 2023-09-27 PROCEDURE — 72100 X-RAY EXAM L-S SPINE 2/3 VWS: CPT

## 2023-09-30 ENCOUNTER — OUTPATIENT (OUTPATIENT)
Dept: OUTPATIENT SERVICES | Facility: HOSPITAL | Age: 83
LOS: 1 days | End: 2023-09-30
Payer: MEDICARE

## 2023-09-30 VITALS
SYSTOLIC BLOOD PRESSURE: 128 MMHG | HEART RATE: 69 BPM | RESPIRATION RATE: 16 BRPM | DIASTOLIC BLOOD PRESSURE: 79 MMHG | TEMPERATURE: 99 F

## 2023-09-30 VITALS
TEMPERATURE: 99 F | SYSTOLIC BLOOD PRESSURE: 119 MMHG | HEART RATE: 73 BPM | RESPIRATION RATE: 17 BRPM | DIASTOLIC BLOOD PRESSURE: 72 MMHG

## 2023-09-30 DIAGNOSIS — Z98.890 OTHER SPECIFIED POSTPROCEDURAL STATES: Chronic | ICD-10-CM

## 2023-09-30 DIAGNOSIS — Z98.89 OTHER SPECIFIED POSTPROCEDURAL STATES: Chronic | ICD-10-CM

## 2023-09-30 DIAGNOSIS — H26.40 UNSPECIFIED SECONDARY CATARACT: Chronic | ICD-10-CM

## 2023-09-30 DIAGNOSIS — Z90.710 ACQUIRED ABSENCE OF BOTH CERVIX AND UTERUS: Chronic | ICD-10-CM

## 2023-09-30 DIAGNOSIS — S72.001A FRACTURE OF UNSPECIFIED PART OF NECK OF RIGHT FEMUR, INITIAL ENCOUNTER FOR CLOSED FRACTURE: Chronic | ICD-10-CM

## 2023-09-30 DIAGNOSIS — Z95.828 PRESENCE OF OTHER VASCULAR IMPLANTS AND GRAFTS: Chronic | ICD-10-CM

## 2023-09-30 DIAGNOSIS — D63.1 ANEMIA IN CHRONIC KIDNEY DISEASE: ICD-10-CM

## 2023-09-30 LAB
BLD GP AB SCN SERPL QL: SIGNIFICANT CHANGE UP
HCT VFR BLD CALC: 23 % — LOW (ref 34.5–45)
HGB BLD-MCNC: 7.1 G/DL — LOW (ref 11.5–15.5)
MCHC RBC-ENTMCNC: 29 PG — SIGNIFICANT CHANGE UP (ref 27–34)
MCHC RBC-ENTMCNC: 30.9 GM/DL — LOW (ref 32–36)
MCV RBC AUTO: 93.9 FL — SIGNIFICANT CHANGE UP (ref 80–100)
NRBC # BLD: 2 /100 WBCS — HIGH (ref 0–0)
PLATELET # BLD AUTO: 284 K/UL — SIGNIFICANT CHANGE UP (ref 150–400)
RBC # BLD: 2.45 M/UL — LOW (ref 3.8–5.2)
RBC # FLD: 20.2 % — HIGH (ref 10.3–14.5)
WBC # BLD: 6.8 K/UL — SIGNIFICANT CHANGE UP (ref 3.8–10.5)
WBC # FLD AUTO: 6.8 K/UL — SIGNIFICANT CHANGE UP (ref 3.8–10.5)

## 2023-09-30 PROCEDURE — 86850 RBC ANTIBODY SCREEN: CPT

## 2023-09-30 PROCEDURE — P9040: CPT

## 2023-09-30 PROCEDURE — 86923 COMPATIBILITY TEST ELECTRIC: CPT

## 2023-09-30 PROCEDURE — 36415 COLL VENOUS BLD VENIPUNCTURE: CPT

## 2023-09-30 PROCEDURE — 86901 BLOOD TYPING SEROLOGIC RH(D): CPT

## 2023-09-30 PROCEDURE — 36430 TRANSFUSION BLD/BLD COMPNT: CPT

## 2023-09-30 PROCEDURE — 85027 COMPLETE CBC AUTOMATED: CPT

## 2023-09-30 PROCEDURE — 86900 BLOOD TYPING SEROLOGIC ABO: CPT

## 2023-09-30 RX ORDER — DIPHENHYDRAMINE HCL 50 MG
25 CAPSULE ORAL ONCE
Refills: 0 | Status: COMPLETED | OUTPATIENT
Start: 2023-09-30 | End: 2023-09-30

## 2023-09-30 RX ORDER — ACETAMINOPHEN 500 MG
650 TABLET ORAL ONCE
Refills: 0 | Status: COMPLETED | OUTPATIENT
Start: 2023-09-30 | End: 2023-09-30

## 2023-09-30 RX ADMIN — Medication 25 MILLIGRAM(S): at 09:59

## 2023-09-30 RX ADMIN — Medication 650 MILLIGRAM(S): at 10:00

## 2023-09-30 RX ADMIN — Medication 650 MILLIGRAM(S): at 10:38

## 2023-10-05 DIAGNOSIS — Z98.41 CATARACT EXTRACTION STATUS, RIGHT EYE: ICD-10-CM

## 2023-10-05 DIAGNOSIS — Z96.641 PRESENCE OF RIGHT ARTIFICIAL HIP JOINT: ICD-10-CM

## 2023-10-05 DIAGNOSIS — K44.9 DIAPHRAGMATIC HERNIA WITHOUT OBSTRUCTION OR GANGRENE: ICD-10-CM

## 2023-10-05 DIAGNOSIS — D62 ACUTE POSTHEMORRHAGIC ANEMIA: ICD-10-CM

## 2023-10-05 DIAGNOSIS — I48.0 PAROXYSMAL ATRIAL FIBRILLATION: ICD-10-CM

## 2023-10-05 DIAGNOSIS — D61.9 APLASTIC ANEMIA, UNSPECIFIED: ICD-10-CM

## 2023-10-05 DIAGNOSIS — I95.1 ORTHOSTATIC HYPOTENSION: ICD-10-CM

## 2023-10-05 DIAGNOSIS — Z95.828 PRESENCE OF OTHER VASCULAR IMPLANTS AND GRAFTS: ICD-10-CM

## 2023-10-05 DIAGNOSIS — E11.40 TYPE 2 DIABETES MELLITUS WITH DIABETIC NEUROPATHY, UNSPECIFIED: ICD-10-CM

## 2023-10-05 DIAGNOSIS — Z79.52 LONG TERM (CURRENT) USE OF SYSTEMIC STEROIDS: ICD-10-CM

## 2023-10-05 DIAGNOSIS — Z98.42 CATARACT EXTRACTION STATUS, LEFT EYE: ICD-10-CM

## 2023-10-05 DIAGNOSIS — N17.9 ACUTE KIDNEY FAILURE, UNSPECIFIED: ICD-10-CM

## 2023-10-05 DIAGNOSIS — M80.88XA OTHER OSTEOPOROSIS WITH CURRENT PATHOLOGICAL FRACTURE, VERTEBRA(E), INITIAL ENCOUNTER FOR FRACTURE: ICD-10-CM

## 2023-10-05 DIAGNOSIS — K58.9 IRRITABLE BOWEL SYNDROME WITHOUT DIARRHEA: ICD-10-CM

## 2023-10-05 DIAGNOSIS — Z96.1 PRESENCE OF INTRAOCULAR LENS: ICD-10-CM

## 2023-10-05 DIAGNOSIS — S70.11XD CONTUSION OF RIGHT THIGH, SUBSEQUENT ENCOUNTER: ICD-10-CM

## 2023-10-05 DIAGNOSIS — E78.5 HYPERLIPIDEMIA, UNSPECIFIED: ICD-10-CM

## 2023-10-05 DIAGNOSIS — I10 ESSENTIAL (PRIMARY) HYPERTENSION: ICD-10-CM

## 2023-10-05 DIAGNOSIS — E87.5 HYPERKALEMIA: ICD-10-CM

## 2023-10-05 DIAGNOSIS — Z90.710 ACQUIRED ABSENCE OF BOTH CERVIX AND UTERUS: ICD-10-CM

## 2023-10-05 DIAGNOSIS — M35.3 POLYMYALGIA RHEUMATICA: ICD-10-CM

## 2023-10-05 DIAGNOSIS — J44.9 CHRONIC OBSTRUCTIVE PULMONARY DISEASE, UNSPECIFIED: ICD-10-CM

## 2023-10-05 DIAGNOSIS — Z85.828 PERSONAL HISTORY OF OTHER MALIGNANT NEOPLASM OF SKIN: ICD-10-CM

## 2023-10-05 DIAGNOSIS — Z79.84 LONG TERM (CURRENT) USE OF ORAL HYPOGLYCEMIC DRUGS: ICD-10-CM

## 2023-10-05 DIAGNOSIS — Z79.01 LONG TERM (CURRENT) USE OF ANTICOAGULANTS: ICD-10-CM

## 2023-10-21 ENCOUNTER — OUTPATIENT (OUTPATIENT)
Dept: OUTPATIENT SERVICES | Facility: HOSPITAL | Age: 83
LOS: 1 days | End: 2023-10-21
Payer: MEDICARE

## 2023-10-21 DIAGNOSIS — Z95.828 PRESENCE OF OTHER VASCULAR IMPLANTS AND GRAFTS: Chronic | ICD-10-CM

## 2023-10-21 DIAGNOSIS — H26.40 UNSPECIFIED SECONDARY CATARACT: Chronic | ICD-10-CM

## 2023-10-21 DIAGNOSIS — Z98.890 OTHER SPECIFIED POSTPROCEDURAL STATES: Chronic | ICD-10-CM

## 2023-10-21 DIAGNOSIS — Z98.89 OTHER SPECIFIED POSTPROCEDURAL STATES: Chronic | ICD-10-CM

## 2023-10-21 DIAGNOSIS — S72.001A FRACTURE OF UNSPECIFIED PART OF NECK OF RIGHT FEMUR, INITIAL ENCOUNTER FOR CLOSED FRACTURE: Chronic | ICD-10-CM

## 2023-10-21 DIAGNOSIS — D64.9 ANEMIA, UNSPECIFIED: ICD-10-CM

## 2023-10-21 DIAGNOSIS — Z90.710 ACQUIRED ABSENCE OF BOTH CERVIX AND UTERUS: Chronic | ICD-10-CM

## 2023-10-21 LAB
BLD GP AB SCN SERPL QL: SIGNIFICANT CHANGE UP
BLD GP AB SCN SERPL QL: SIGNIFICANT CHANGE UP
HCT VFR BLD CALC: 19.4 % — CRITICAL LOW (ref 34.5–45)
HCT VFR BLD CALC: 19.4 % — CRITICAL LOW (ref 34.5–45)
HGB BLD-MCNC: 6 G/DL — CRITICAL LOW (ref 11.5–15.5)
HGB BLD-MCNC: 6 G/DL — CRITICAL LOW (ref 11.5–15.5)
MCHC RBC-ENTMCNC: 29.3 PG — SIGNIFICANT CHANGE UP (ref 27–34)
MCHC RBC-ENTMCNC: 29.3 PG — SIGNIFICANT CHANGE UP (ref 27–34)
MCHC RBC-ENTMCNC: 30.9 GM/DL — LOW (ref 32–36)
MCHC RBC-ENTMCNC: 30.9 GM/DL — LOW (ref 32–36)
MCV RBC AUTO: 94.6 FL — SIGNIFICANT CHANGE UP (ref 80–100)
MCV RBC AUTO: 94.6 FL — SIGNIFICANT CHANGE UP (ref 80–100)
NRBC # BLD: 0 /100 WBCS — SIGNIFICANT CHANGE UP (ref 0–0)
NRBC # BLD: 0 /100 WBCS — SIGNIFICANT CHANGE UP (ref 0–0)
PLATELET # BLD AUTO: 206 K/UL — SIGNIFICANT CHANGE UP (ref 150–400)
PLATELET # BLD AUTO: 206 K/UL — SIGNIFICANT CHANGE UP (ref 150–400)
RBC # BLD: 2.05 M/UL — LOW (ref 3.8–5.2)
RBC # BLD: 2.05 M/UL — LOW (ref 3.8–5.2)
RBC # FLD: 22.4 % — HIGH (ref 10.3–14.5)
RBC # FLD: 22.4 % — HIGH (ref 10.3–14.5)
WBC # BLD: 3.6 K/UL — LOW (ref 3.8–10.5)
WBC # BLD: 3.6 K/UL — LOW (ref 3.8–10.5)
WBC # FLD AUTO: 3.6 K/UL — LOW (ref 3.8–10.5)
WBC # FLD AUTO: 3.6 K/UL — LOW (ref 3.8–10.5)

## 2023-10-21 PROCEDURE — 36415 COLL VENOUS BLD VENIPUNCTURE: CPT

## 2023-10-21 PROCEDURE — 86901 BLOOD TYPING SEROLOGIC RH(D): CPT

## 2023-10-21 PROCEDURE — 86850 RBC ANTIBODY SCREEN: CPT

## 2023-10-21 PROCEDURE — 85027 COMPLETE CBC AUTOMATED: CPT

## 2023-10-21 PROCEDURE — P9040: CPT

## 2023-10-21 PROCEDURE — 86923 COMPATIBILITY TEST ELECTRIC: CPT

## 2023-10-21 PROCEDURE — 86900 BLOOD TYPING SEROLOGIC ABO: CPT

## 2023-10-21 PROCEDURE — 36430 TRANSFUSION BLD/BLD COMPNT: CPT

## 2023-10-21 RX ORDER — DIPHENHYDRAMINE HCL 50 MG
25 CAPSULE ORAL ONCE
Refills: 0 | Status: COMPLETED | OUTPATIENT
Start: 2023-10-21 | End: 2023-10-21

## 2023-10-21 RX ORDER — ACETAMINOPHEN 500 MG
650 TABLET ORAL ONCE
Refills: 0 | Status: COMPLETED | OUTPATIENT
Start: 2023-10-21 | End: 2023-10-21

## 2023-10-21 RX ADMIN — Medication 650 MILLIGRAM(S): at 10:07

## 2023-10-21 RX ADMIN — Medication 25 MILLIGRAM(S): at 10:07

## 2023-10-21 RX ADMIN — Medication 300 UNIT(S): at 17:45

## 2023-10-21 RX ADMIN — Medication 650 MILLIGRAM(S): at 10:26

## 2023-11-06 ENCOUNTER — APPOINTMENT (OUTPATIENT)
Dept: INTERNAL MEDICINE | Facility: CLINIC | Age: 83
End: 2023-11-06
Payer: MEDICARE

## 2023-11-06 VITALS
HEART RATE: 69 BPM | TEMPERATURE: 98.1 F | SYSTOLIC BLOOD PRESSURE: 132 MMHG | BODY MASS INDEX: 29.82 KG/M2 | RESPIRATION RATE: 16 BRPM | OXYGEN SATURATION: 98 % | DIASTOLIC BLOOD PRESSURE: 80 MMHG | HEIGHT: 65 IN | WEIGHT: 179 LBS

## 2023-11-06 PROCEDURE — 99214 OFFICE O/P EST MOD 30 MIN: CPT

## 2023-11-06 RX ORDER — APIXABAN 2.5 MG/1
2.5 TABLET, FILM COATED ORAL
Refills: 0 | Status: ACTIVE | COMMUNITY

## 2023-11-06 RX ORDER — WARFARIN 6 MG/1
6 TABLET ORAL
Refills: 0 | Status: DISCONTINUED | COMMUNITY
Start: 2022-05-12 | End: 2023-11-06

## 2023-11-06 RX ORDER — WARFARIN 4 MG/1
4 TABLET ORAL
Refills: 0 | Status: DISCONTINUED | COMMUNITY
Start: 2022-05-12 | End: 2023-11-06

## 2023-11-06 RX ORDER — TRAMADOL HYDROCHLORIDE 50 MG/1
50 TABLET, COATED ORAL
Refills: 0 | Status: DISCONTINUED | COMMUNITY
Start: 2018-01-15 | End: 2023-11-06

## 2023-11-07 RX ORDER — FUROSEMIDE 40 MG
20 TABLET ORAL ONCE
Refills: 0 | Status: COMPLETED | OUTPATIENT
Start: 2023-11-11 | End: 2023-11-11

## 2023-11-11 ENCOUNTER — OUTPATIENT (OUTPATIENT)
Dept: OUTPATIENT SERVICES | Facility: HOSPITAL | Age: 83
LOS: 1 days | End: 2023-11-11
Payer: MEDICARE

## 2023-11-11 DIAGNOSIS — H26.40 UNSPECIFIED SECONDARY CATARACT: Chronic | ICD-10-CM

## 2023-11-11 DIAGNOSIS — Z98.890 OTHER SPECIFIED POSTPROCEDURAL STATES: Chronic | ICD-10-CM

## 2023-11-11 DIAGNOSIS — D63.1 ANEMIA IN CHRONIC KIDNEY DISEASE: ICD-10-CM

## 2023-11-11 DIAGNOSIS — Z95.828 PRESENCE OF OTHER VASCULAR IMPLANTS AND GRAFTS: Chronic | ICD-10-CM

## 2023-11-11 DIAGNOSIS — S72.001A FRACTURE OF UNSPECIFIED PART OF NECK OF RIGHT FEMUR, INITIAL ENCOUNTER FOR CLOSED FRACTURE: Chronic | ICD-10-CM

## 2023-11-11 DIAGNOSIS — Z98.89 OTHER SPECIFIED POSTPROCEDURAL STATES: Chronic | ICD-10-CM

## 2023-11-11 DIAGNOSIS — Z90.710 ACQUIRED ABSENCE OF BOTH CERVIX AND UTERUS: Chronic | ICD-10-CM

## 2023-11-11 LAB
BLD GP AB SCN SERPL QL: SIGNIFICANT CHANGE UP
BLD GP AB SCN SERPL QL: SIGNIFICANT CHANGE UP
HCT VFR BLD CALC: 22.4 % — LOW (ref 34.5–45)
HCT VFR BLD CALC: 22.4 % — LOW (ref 34.5–45)
HGB BLD-MCNC: 7 G/DL — CRITICAL LOW (ref 11.5–15.5)
HGB BLD-MCNC: 7 G/DL — CRITICAL LOW (ref 11.5–15.5)
MCHC RBC-ENTMCNC: 29.7 PG — SIGNIFICANT CHANGE UP (ref 27–34)
MCHC RBC-ENTMCNC: 29.7 PG — SIGNIFICANT CHANGE UP (ref 27–34)
MCHC RBC-ENTMCNC: 31.3 GM/DL — LOW (ref 32–36)
MCHC RBC-ENTMCNC: 31.3 GM/DL — LOW (ref 32–36)
MCV RBC AUTO: 94.9 FL — SIGNIFICANT CHANGE UP (ref 80–100)
MCV RBC AUTO: 94.9 FL — SIGNIFICANT CHANGE UP (ref 80–100)
NRBC # BLD: 4 /100 WBCS — HIGH (ref 0–0)
NRBC # BLD: 4 /100 WBCS — HIGH (ref 0–0)
PLATELET # BLD AUTO: 199 K/UL — SIGNIFICANT CHANGE UP (ref 150–400)
PLATELET # BLD AUTO: 199 K/UL — SIGNIFICANT CHANGE UP (ref 150–400)
RBC # BLD: 2.36 M/UL — LOW (ref 3.8–5.2)
RBC # BLD: 2.36 M/UL — LOW (ref 3.8–5.2)
RBC # FLD: 21 % — HIGH (ref 10.3–14.5)
RBC # FLD: 21 % — HIGH (ref 10.3–14.5)
WBC # BLD: 3.31 K/UL — LOW (ref 3.8–10.5)
WBC # BLD: 3.31 K/UL — LOW (ref 3.8–10.5)
WBC # FLD AUTO: 3.31 K/UL — LOW (ref 3.8–10.5)
WBC # FLD AUTO: 3.31 K/UL — LOW (ref 3.8–10.5)

## 2023-11-11 PROCEDURE — 86923 COMPATIBILITY TEST ELECTRIC: CPT

## 2023-11-11 PROCEDURE — 85027 COMPLETE CBC AUTOMATED: CPT

## 2023-11-11 PROCEDURE — P9040: CPT

## 2023-11-11 PROCEDURE — 86850 RBC ANTIBODY SCREEN: CPT

## 2023-11-11 PROCEDURE — 36415 COLL VENOUS BLD VENIPUNCTURE: CPT

## 2023-11-11 PROCEDURE — 86900 BLOOD TYPING SEROLOGIC ABO: CPT

## 2023-11-11 PROCEDURE — 36430 TRANSFUSION BLD/BLD COMPNT: CPT

## 2023-11-11 PROCEDURE — 86901 BLOOD TYPING SEROLOGIC RH(D): CPT

## 2023-11-11 RX ORDER — ACETAMINOPHEN 500 MG
650 TABLET ORAL ONCE
Refills: 0 | Status: COMPLETED | OUTPATIENT
Start: 2023-11-11 | End: 2023-11-11

## 2023-11-11 RX ORDER — DIPHENHYDRAMINE HCL 50 MG
25 CAPSULE ORAL ONCE
Refills: 0 | Status: COMPLETED | OUTPATIENT
Start: 2023-11-11 | End: 2023-11-11

## 2023-11-11 RX ADMIN — Medication 650 MILLIGRAM(S): at 12:40

## 2023-11-11 RX ADMIN — Medication 20 MILLIGRAM(S): at 15:53

## 2023-11-11 RX ADMIN — Medication 25 MILLIGRAM(S): at 12:19

## 2023-11-11 RX ADMIN — Medication 100 UNIT(S): at 19:25

## 2023-11-11 RX ADMIN — Medication 650 MILLIGRAM(S): at 12:18

## 2023-11-15 ENCOUNTER — APPOINTMENT (OUTPATIENT)
Dept: CARDIOLOGY | Facility: CLINIC | Age: 83
End: 2023-11-15
Payer: MEDICARE

## 2023-11-15 ENCOUNTER — APPOINTMENT (OUTPATIENT)
Dept: RHEUMATOLOGY | Facility: CLINIC | Age: 83
End: 2023-11-15

## 2023-11-15 VITALS
BODY MASS INDEX: 28.16 KG/M2 | SYSTOLIC BLOOD PRESSURE: 138 MMHG | OXYGEN SATURATION: 97 % | HEART RATE: 71 BPM | WEIGHT: 169 LBS | HEIGHT: 65 IN | DIASTOLIC BLOOD PRESSURE: 57 MMHG

## 2023-11-15 PROCEDURE — 93000 ELECTROCARDIOGRAM COMPLETE: CPT

## 2023-11-15 PROCEDURE — 99215 OFFICE O/P EST HI 40 MIN: CPT

## 2023-11-15 RX ORDER — DENOSUMAB 60 MG/ML
INJECTION SUBCUTANEOUS
Refills: 0 | Status: ACTIVE | COMMUNITY

## 2023-11-15 RX ORDER — EPOETIN ALFA-EPBX 20000 [IU]/ML
INJECTION, SOLUTION INTRAVENOUS; SUBCUTANEOUS
Refills: 0 | Status: ACTIVE | COMMUNITY

## 2023-11-15 RX ORDER — AMIODARONE HYDROCHLORIDE 100 MG/1
100 TABLET ORAL DAILY
Refills: 0 | Status: ACTIVE | COMMUNITY

## 2023-11-15 RX ORDER — OMEPRAZOLE MAGNESIUM 20 MG/1
20 CAPSULE, DELAYED RELEASE ORAL DAILY
Refills: 0 | Status: ACTIVE | COMMUNITY

## 2023-11-15 RX ORDER — GABAPENTIN 400 MG
400 TABLET ORAL
Refills: 0 | Status: ACTIVE | COMMUNITY

## 2023-11-15 RX ORDER — MIDODRINE HYDROCHLORIDE 10 MG/1
10 TABLET ORAL 3 TIMES DAILY
Qty: 270 | Refills: 3 | Status: DISCONTINUED | COMMUNITY
Start: 2023-01-26 | End: 2023-11-15

## 2023-11-15 RX ORDER — DICYCLOMINE HYDROCHLORIDE 10 MG/1
10 CAPSULE ORAL
Refills: 0 | Status: ACTIVE | COMMUNITY

## 2023-11-30 RX ORDER — APIXABAN 2.5 MG/1
2.5 TABLET, FILM COATED ORAL
Qty: 180 | Refills: 2 | Status: ACTIVE | COMMUNITY
Start: 2023-10-16

## 2023-12-01 ENCOUNTER — APPOINTMENT (OUTPATIENT)
Dept: ORTHOPEDIC SURGERY | Facility: CLINIC | Age: 83
End: 2023-12-01
Payer: MEDICARE

## 2023-12-01 DIAGNOSIS — R07.81 PLEURODYNIA: ICD-10-CM

## 2023-12-01 PROCEDURE — 72070 X-RAY EXAM THORAC SPINE 2VWS: CPT

## 2023-12-01 PROCEDURE — 71100 X-RAY EXAM RIBS UNI 2 VIEWS: CPT | Mod: RT

## 2023-12-01 PROCEDURE — 72100 X-RAY EXAM L-S SPINE 2/3 VWS: CPT

## 2023-12-01 PROCEDURE — 99214 OFFICE O/P EST MOD 30 MIN: CPT

## 2023-12-02 ENCOUNTER — RESULT REVIEW (OUTPATIENT)
Age: 83
End: 2023-12-02

## 2023-12-03 ENCOUNTER — OUTPATIENT (OUTPATIENT)
Dept: OUTPATIENT SERVICES | Facility: HOSPITAL | Age: 83
LOS: 1 days | End: 2023-12-03
Payer: MEDICARE

## 2023-12-03 VITALS
TEMPERATURE: 99 F | HEART RATE: 70 BPM | RESPIRATION RATE: 17 BRPM | SYSTOLIC BLOOD PRESSURE: 123 MMHG | OXYGEN SATURATION: 94 % | DIASTOLIC BLOOD PRESSURE: 68 MMHG

## 2023-12-03 VITALS
RESPIRATION RATE: 17 BRPM | OXYGEN SATURATION: 94 % | DIASTOLIC BLOOD PRESSURE: 58 MMHG | SYSTOLIC BLOOD PRESSURE: 113 MMHG | HEART RATE: 70 BPM | TEMPERATURE: 99 F

## 2023-12-03 DIAGNOSIS — Z98.89 OTHER SPECIFIED POSTPROCEDURAL STATES: Chronic | ICD-10-CM

## 2023-12-03 DIAGNOSIS — S72.001A FRACTURE OF UNSPECIFIED PART OF NECK OF RIGHT FEMUR, INITIAL ENCOUNTER FOR CLOSED FRACTURE: Chronic | ICD-10-CM

## 2023-12-03 DIAGNOSIS — D63.1 ANEMIA IN CHRONIC KIDNEY DISEASE: ICD-10-CM

## 2023-12-03 DIAGNOSIS — H26.40 UNSPECIFIED SECONDARY CATARACT: Chronic | ICD-10-CM

## 2023-12-03 DIAGNOSIS — Z98.890 OTHER SPECIFIED POSTPROCEDURAL STATES: Chronic | ICD-10-CM

## 2023-12-03 DIAGNOSIS — Z90.710 ACQUIRED ABSENCE OF BOTH CERVIX AND UTERUS: Chronic | ICD-10-CM

## 2023-12-03 DIAGNOSIS — Z95.828 PRESENCE OF OTHER VASCULAR IMPLANTS AND GRAFTS: Chronic | ICD-10-CM

## 2023-12-03 LAB
BLD GP AB SCN SERPL QL: SIGNIFICANT CHANGE UP
BLD GP AB SCN SERPL QL: SIGNIFICANT CHANGE UP
HCT VFR BLD CALC: 23.1 % — LOW (ref 34.5–45)
HCT VFR BLD CALC: 23.1 % — LOW (ref 34.5–45)
HGB BLD-MCNC: 7.3 G/DL — LOW (ref 11.5–15.5)
HGB BLD-MCNC: 7.3 G/DL — LOW (ref 11.5–15.5)
MCHC RBC-ENTMCNC: 30.3 PG — SIGNIFICANT CHANGE UP (ref 27–34)
MCHC RBC-ENTMCNC: 30.3 PG — SIGNIFICANT CHANGE UP (ref 27–34)
MCHC RBC-ENTMCNC: 31.6 GM/DL — LOW (ref 32–36)
MCHC RBC-ENTMCNC: 31.6 GM/DL — LOW (ref 32–36)
MCV RBC AUTO: 95.9 FL — SIGNIFICANT CHANGE UP (ref 80–100)
MCV RBC AUTO: 95.9 FL — SIGNIFICANT CHANGE UP (ref 80–100)
NRBC # BLD: 6 /100 WBCS — HIGH (ref 0–0)
NRBC # BLD: 6 /100 WBCS — HIGH (ref 0–0)
PLATELET # BLD AUTO: 152 K/UL — SIGNIFICANT CHANGE UP (ref 150–400)
PLATELET # BLD AUTO: 152 K/UL — SIGNIFICANT CHANGE UP (ref 150–400)
RBC # BLD: 2.41 M/UL — LOW (ref 3.8–5.2)
RBC # BLD: 2.41 M/UL — LOW (ref 3.8–5.2)
RBC # FLD: 23.7 % — HIGH (ref 10.3–14.5)
RBC # FLD: 23.7 % — HIGH (ref 10.3–14.5)
WBC # BLD: 3.24 K/UL — LOW (ref 3.8–10.5)
WBC # BLD: 3.24 K/UL — LOW (ref 3.8–10.5)
WBC # FLD AUTO: 3.24 K/UL — LOW (ref 3.8–10.5)
WBC # FLD AUTO: 3.24 K/UL — LOW (ref 3.8–10.5)

## 2023-12-03 PROCEDURE — 85027 COMPLETE CBC AUTOMATED: CPT

## 2023-12-03 PROCEDURE — 86900 BLOOD TYPING SEROLOGIC ABO: CPT

## 2023-12-03 PROCEDURE — 86901 BLOOD TYPING SEROLOGIC RH(D): CPT

## 2023-12-03 PROCEDURE — 86923 COMPATIBILITY TEST ELECTRIC: CPT

## 2023-12-03 PROCEDURE — 36430 TRANSFUSION BLD/BLD COMPNT: CPT

## 2023-12-03 PROCEDURE — P9040: CPT

## 2023-12-03 PROCEDURE — 86850 RBC ANTIBODY SCREEN: CPT

## 2023-12-03 PROCEDURE — 36415 COLL VENOUS BLD VENIPUNCTURE: CPT

## 2023-12-03 RX ORDER — ACETAMINOPHEN 500 MG
650 TABLET ORAL ONCE
Refills: 0 | Status: COMPLETED | OUTPATIENT
Start: 2023-12-03 | End: 2023-12-03

## 2023-12-03 RX ORDER — DIPHENHYDRAMINE HCL 50 MG
25 CAPSULE ORAL ONCE
Refills: 0 | Status: COMPLETED | OUTPATIENT
Start: 2023-12-03 | End: 2023-12-03

## 2023-12-03 RX ORDER — OXYCODONE HYDROCHLORIDE 5 MG/1
5 TABLET ORAL ONCE
Refills: 0 | Status: DISCONTINUED | OUTPATIENT
Start: 2023-12-03 | End: 2023-12-03

## 2023-12-03 RX ADMIN — Medication 25 MILLIGRAM(S): at 11:11

## 2023-12-03 RX ADMIN — OXYCODONE HYDROCHLORIDE 5 MILLIGRAM(S): 5 TABLET ORAL at 11:53

## 2023-12-03 RX ADMIN — Medication 300 UNIT(S): at 18:06

## 2023-12-03 RX ADMIN — Medication 650 MILLIGRAM(S): at 11:11

## 2023-12-03 RX ADMIN — OXYCODONE HYDROCHLORIDE 5 MILLIGRAM(S): 5 TABLET ORAL at 12:23

## 2023-12-11 ENCOUNTER — NON-APPOINTMENT (OUTPATIENT)
Age: 83
End: 2023-12-11

## 2023-12-11 ENCOUNTER — APPOINTMENT (OUTPATIENT)
Dept: INTERNAL MEDICINE | Facility: CLINIC | Age: 83
End: 2023-12-11
Payer: MEDICARE

## 2023-12-11 VITALS
HEIGHT: 64 IN | WEIGHT: 169 LBS | HEART RATE: 69 BPM | DIASTOLIC BLOOD PRESSURE: 52 MMHG | OXYGEN SATURATION: 96 % | RESPIRATION RATE: 16 BRPM | BODY MASS INDEX: 28.85 KG/M2 | SYSTOLIC BLOOD PRESSURE: 110 MMHG | TEMPERATURE: 98.5 F

## 2023-12-11 DIAGNOSIS — R07.81 PLEURODYNIA: ICD-10-CM

## 2023-12-11 PROCEDURE — 99214 OFFICE O/P EST MOD 30 MIN: CPT

## 2023-12-21 ENCOUNTER — APPOINTMENT (OUTPATIENT)
Dept: CARDIOLOGY | Facility: CLINIC | Age: 83
End: 2023-12-21
Payer: MEDICARE

## 2023-12-21 VITALS
BODY MASS INDEX: 28.51 KG/M2 | HEIGHT: 64 IN | OXYGEN SATURATION: 97 % | HEART RATE: 72 BPM | WEIGHT: 167 LBS | DIASTOLIC BLOOD PRESSURE: 75 MMHG | SYSTOLIC BLOOD PRESSURE: 123 MMHG

## 2023-12-21 PROCEDURE — 93000 ELECTROCARDIOGRAM COMPLETE: CPT

## 2023-12-21 PROCEDURE — 99214 OFFICE O/P EST MOD 30 MIN: CPT

## 2023-12-21 NOTE — PHYSICAL EXAM
[Well Nourished] : well nourished [Normal Rate] : normal [Rhythm Regular] : regular [Normal S1] : normal S1 [Normal S2] : normal S2 [II] : a grade 2 [___ +] : bilateral [unfilled]U+ pitting edema to the ankles [1+] : left 1+ [Right Carotid Bruit] : no bruit heard over the right carotid [Left Carotid Bruit] : no bruit heard over the left carotid [Clear Lung Fields] : clear lung fields [No Respiratory Distress] : no respiratory distress  [Wheeze ____] : wheeze [unfilled] [Normal] : alert and oriented, normal memory [de-identified] : in wheelchair [de-identified] : in wheelchair

## 2023-12-21 NOTE — REVIEW OF SYSTEMS
[Feeling Fatigued] : not feeling fatigued [Dyspnea on exertion] : dyspnea during exertion [Lower Ext Edema] : lower extremity edema [Cough] : no cough [Joint Pain] : joint pain [Joint Stiffness] : joint stiffness [Dizziness] : dizziness [Negative] : Heme/Lymph [FreeTextEntry2] : as per HPI [de-identified] : as per HPI- unchanged

## 2023-12-21 NOTE — DISCUSSION/SUMMARY
[FreeTextEntry1] : 83 year old woman with a history as listed above presents for a followup visit.  Dee has been recovering well.  She denies any anginal symptoms. Clinically she is compensated from a HF POV on torsemide 20mg Qday.  Her orthostasis has improved on Midodrine  5 mg qday. She will increase it to 5mg q12.  I will tolerate a small level of permissive HTN.    She had a Ziopatch in 2/2021 that showed symptomatic PACs and PAT episodes. There was no AF seen. She did not have any signs of heart block or symptomatic bradycardia during the monitoring period.  Cardizem will be stopped today. She will continue  Amiodarone 100mg Qday Her last PFTs in 2/2023 showed severe reduction in diffusion capacity. This may be from Amio. Will have a repeat PFT in 3 months. She continue to follow up with Dr Gallegos.  SHe has now been transitioned to Eliquis 2,5mg Q12.  She will see heme for her aplastic anemia. She gets IVIG. She will fu with heme.  Her lipid profile is at goal.  Exercise and diet counseling was performed in order to reduce her future cardiovascular risk.  I asked she return for follow up in 2-3 months.      [EKG obtained to assist in diagnosis and management of assessed problem(s)] : EKG obtained to assist in diagnosis and management of assessed problem(s)

## 2023-12-21 NOTE — HISTORY OF PRESENT ILLNESS
[FreeTextEntry1] : 83 year old female with PMhx of AFib (on Warfarin), CKD, COPD, Anemia, DM, HTN, Hiatal Hernia, IBS, PAD, TMJ, Sinus Ermias, PMR on Prednisone who is here for Cardiac Follow up.  She had a kyphoplasty on 9/16/23. Her course was complicated by a large hematoma in her right thigh from her lovenox injection and needed 4 units PRBCs. [facility]. Had PNA that was treated Recently needed two units of PRBC. She got Lasix post PRBCs and did not feel well with it. She is now taking torsemide 20 mg Qday. She is now on Eliquis.     Since her last visit, she is feeling better with PT/OT at home. She is still getting lightheaded upon change of positions. Though she is only taking the Midodrine PRN.  Her lower extremity edema has improved  She   denies any dyspnea,  chest pain, PND, orthopnea, lower extremity edema,  strokelike symptoms.  Medication reconciliation performed. She is compliant with her medications.

## 2023-12-21 NOTE — CARDIOLOGY SUMMARY
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  [de-identified] :  2/2021 that showed symptomatic PACs and PAT episodes. There was no AF seen.  [de-identified] : 4/2021 normal LV function w moderate LVH. moderate PHTN 55mmHG 3/2023 normal LV function LVH, mild PHTN  10/27/23 (at rehab) normal LV function.

## 2024-01-15 ENCOUNTER — OUTPATIENT (OUTPATIENT)
Dept: OUTPATIENT SERVICES | Facility: HOSPITAL | Age: 84
LOS: 1 days | End: 2024-01-15
Payer: MEDICARE

## 2024-01-15 VITALS
OXYGEN SATURATION: 97 % | DIASTOLIC BLOOD PRESSURE: 56 MMHG | SYSTOLIC BLOOD PRESSURE: 109 MMHG | TEMPERATURE: 98 F | HEART RATE: 57 BPM | RESPIRATION RATE: 17 BRPM

## 2024-01-15 VITALS
SYSTOLIC BLOOD PRESSURE: 124 MMHG | OXYGEN SATURATION: 99 % | HEART RATE: 58 BPM | DIASTOLIC BLOOD PRESSURE: 76 MMHG | RESPIRATION RATE: 17 BRPM | TEMPERATURE: 98 F

## 2024-01-15 DIAGNOSIS — Z95.828 PRESENCE OF OTHER VASCULAR IMPLANTS AND GRAFTS: Chronic | ICD-10-CM

## 2024-01-15 DIAGNOSIS — D63.1 ANEMIA IN CHRONIC KIDNEY DISEASE: ICD-10-CM

## 2024-01-15 DIAGNOSIS — Z98.89 OTHER SPECIFIED POSTPROCEDURAL STATES: Chronic | ICD-10-CM

## 2024-01-15 DIAGNOSIS — Z90.710 ACQUIRED ABSENCE OF BOTH CERVIX AND UTERUS: Chronic | ICD-10-CM

## 2024-01-15 DIAGNOSIS — S72.001A FRACTURE OF UNSPECIFIED PART OF NECK OF RIGHT FEMUR, INITIAL ENCOUNTER FOR CLOSED FRACTURE: Chronic | ICD-10-CM

## 2024-01-15 DIAGNOSIS — H26.40 UNSPECIFIED SECONDARY CATARACT: Chronic | ICD-10-CM

## 2024-01-15 DIAGNOSIS — Z98.890 OTHER SPECIFIED POSTPROCEDURAL STATES: Chronic | ICD-10-CM

## 2024-01-15 LAB
BLD GP AB SCN SERPL QL: SIGNIFICANT CHANGE UP
BLD GP AB SCN SERPL QL: SIGNIFICANT CHANGE UP
HCT VFR BLD CALC: 26.2 % — LOW (ref 34.5–45)
HCT VFR BLD CALC: 26.2 % — LOW (ref 34.5–45)
HGB BLD-MCNC: 8.3 G/DL — LOW (ref 11.5–15.5)
HGB BLD-MCNC: 8.3 G/DL — LOW (ref 11.5–15.5)
MCHC RBC-ENTMCNC: 31.7 GM/DL — LOW (ref 32–36)
MCHC RBC-ENTMCNC: 31.7 GM/DL — LOW (ref 32–36)
MCHC RBC-ENTMCNC: 32.3 PG — SIGNIFICANT CHANGE UP (ref 27–34)
MCHC RBC-ENTMCNC: 32.3 PG — SIGNIFICANT CHANGE UP (ref 27–34)
MCV RBC AUTO: 101.9 FL — HIGH (ref 80–100)
MCV RBC AUTO: 101.9 FL — HIGH (ref 80–100)
NRBC # BLD: 6 /100 WBCS — HIGH (ref 0–0)
NRBC # BLD: 6 /100 WBCS — HIGH (ref 0–0)
PLATELET # BLD AUTO: 173 K/UL — SIGNIFICANT CHANGE UP (ref 150–400)
PLATELET # BLD AUTO: 173 K/UL — SIGNIFICANT CHANGE UP (ref 150–400)
RBC # BLD: 2.57 M/UL — LOW (ref 3.8–5.2)
RBC # BLD: 2.57 M/UL — LOW (ref 3.8–5.2)
RBC # FLD: 26.7 % — HIGH (ref 10.3–14.5)
RBC # FLD: 26.7 % — HIGH (ref 10.3–14.5)
WBC # BLD: 3.4 K/UL — LOW (ref 3.8–10.5)
WBC # BLD: 3.4 K/UL — LOW (ref 3.8–10.5)
WBC # FLD AUTO: 3.4 K/UL — LOW (ref 3.8–10.5)
WBC # FLD AUTO: 3.4 K/UL — LOW (ref 3.8–10.5)

## 2024-01-15 PROCEDURE — 36415 COLL VENOUS BLD VENIPUNCTURE: CPT

## 2024-01-15 PROCEDURE — 86901 BLOOD TYPING SEROLOGIC RH(D): CPT

## 2024-01-15 PROCEDURE — 86900 BLOOD TYPING SEROLOGIC ABO: CPT

## 2024-01-15 PROCEDURE — 85027 COMPLETE CBC AUTOMATED: CPT

## 2024-01-15 PROCEDURE — P9040: CPT

## 2024-01-15 PROCEDURE — 86923 COMPATIBILITY TEST ELECTRIC: CPT

## 2024-01-15 PROCEDURE — 36430 TRANSFUSION BLD/BLD COMPNT: CPT

## 2024-01-15 PROCEDURE — 86850 RBC ANTIBODY SCREEN: CPT

## 2024-01-15 RX ORDER — ACETAMINOPHEN 500 MG
650 TABLET ORAL ONCE
Refills: 0 | Status: COMPLETED | OUTPATIENT
Start: 2024-01-15 | End: 2024-01-15

## 2024-01-15 RX ORDER — FUROSEMIDE 40 MG
40 TABLET ORAL ONCE
Refills: 0 | Status: COMPLETED | OUTPATIENT
Start: 2024-01-15 | End: 2024-01-15

## 2024-01-15 RX ORDER — DIPHENHYDRAMINE HCL 50 MG
25 CAPSULE ORAL ONCE
Refills: 0 | Status: COMPLETED | OUTPATIENT
Start: 2024-01-15 | End: 2024-01-15

## 2024-01-15 RX ADMIN — Medication 25 MILLIGRAM(S): at 10:34

## 2024-01-15 RX ADMIN — Medication 40 MILLIGRAM(S): at 13:54

## 2024-01-15 RX ADMIN — Medication 650 MILLIGRAM(S): at 10:34

## 2024-01-15 RX ADMIN — Medication 300 UNIT(S): at 17:46

## 2024-01-17 ENCOUNTER — APPOINTMENT (OUTPATIENT)
Dept: RHEUMATOLOGY | Facility: CLINIC | Age: 84
End: 2024-01-17
Payer: MEDICARE

## 2024-01-17 VITALS
HEIGHT: 64 IN | SYSTOLIC BLOOD PRESSURE: 123 MMHG | WEIGHT: 158 LBS | HEART RATE: 67 BPM | BODY MASS INDEX: 26.98 KG/M2 | DIASTOLIC BLOOD PRESSURE: 81 MMHG | TEMPERATURE: 98.8 F | OXYGEN SATURATION: 95 %

## 2024-01-17 PROCEDURE — 99214 OFFICE O/P EST MOD 30 MIN: CPT

## 2024-01-17 RX ORDER — PREDNISONE 5 MG/1
5 TABLET ORAL
Qty: 90 | Refills: 1 | Status: ACTIVE | COMMUNITY
Start: 2023-05-10 | End: 1900-01-01

## 2024-01-30 ENCOUNTER — RX RENEWAL (OUTPATIENT)
Age: 84
End: 2024-01-30

## 2024-02-12 ENCOUNTER — APPOINTMENT (OUTPATIENT)
Dept: INTERNAL MEDICINE | Facility: CLINIC | Age: 84
End: 2024-02-12
Payer: MEDICARE

## 2024-02-12 VITALS
HEART RATE: 62 BPM | DIASTOLIC BLOOD PRESSURE: 82 MMHG | OXYGEN SATURATION: 97 % | BODY MASS INDEX: 26.46 KG/M2 | WEIGHT: 155 LBS | RESPIRATION RATE: 16 BRPM | TEMPERATURE: 98.3 F | SYSTOLIC BLOOD PRESSURE: 126 MMHG | HEIGHT: 64 IN

## 2024-02-12 DIAGNOSIS — I48.0 PAROXYSMAL ATRIAL FIBRILLATION: ICD-10-CM

## 2024-02-12 DIAGNOSIS — M35.3 POLYMYALGIA RHEUMATICA: ICD-10-CM

## 2024-02-12 PROCEDURE — 99214 OFFICE O/P EST MOD 30 MIN: CPT

## 2024-02-12 PROCEDURE — G2211 COMPLEX E/M VISIT ADD ON: CPT

## 2024-02-12 NOTE — HISTORY OF PRESENT ILLNESS
[Family Member] : family member [FreeTextEntry1] : follow up  [de-identified] : MARTINEZ JONES is a 83 year old F who presents today for follow up. Pt has a hx of COPD, CKD, HTN , DM and PMR. Pt has been receiving physical therapy at home but wishes to start outpatient physical therapy and needs a referral. Pt reports experiencing joint pains since starting to wean off Prednisone as per her rheumatologist. difficulty ambulating seeing Rheumatologist has PMR on decreasing Dose of Prednisone Has PAF on Eliquis

## 2024-02-12 NOTE — PLAN
[FreeTextEntry1] : continue medications Eliquis  Follow up with physical therapy Follow up with rheumatologist chronic medical conditions HTN DM Return in 3 months

## 2024-02-12 NOTE — END OF VISIT
[FreeTextEntry3] : "I, Harris Richards, personally scribed the services dictated to me by Dr. Severiano Rodas MD in this documentation on 02/12/2024"   "I Dr. Severiano Rodas MD, personally performed the services described in this documentation on 02/12/2024 for the patient as scribed by Harris Richards in my presence. I have reviewed and verified that all the information is accurate and true."

## 2024-02-12 NOTE — HEALTH RISK ASSESSMENT
[Never (0 pts)] : Never (0 points) [No] : In the past 12 months have you used drugs other than those required for medical reasons? No [No falls in past year] : Patient reported no falls in the past year [Assistive Device] : Patient uses an assistive device [Little interest or pleasure doing things] : 1) Little interest or pleasure doing things [Feeling down, depressed, or hopeless] : 2) Feeling down, depressed, or hopeless [0] : 2) Feeling down, depressed, or hopeless: Not at all (0) [PHQ-2 Negative - No further assessment needed] : PHQ-2 Negative - No further assessment needed [Never] : Never [de-identified] : wheelchair  [[id number]] : 0

## 2024-02-22 NOTE — H&P PST ADULT - PROBLEM SELECTOR PLAN 2
Suicide Prevention [facility] Phone: [phone]- TALK ([phone]) Afib and DVT, on Coumadin, and has IVC filter, Is seeing cardiologist regarding bridging with Lovenox while off coumadin

## 2024-03-05 ENCOUNTER — APPOINTMENT (OUTPATIENT)
Dept: CARDIOLOGY | Facility: CLINIC | Age: 84
End: 2024-03-05
Payer: MEDICARE

## 2024-03-05 ENCOUNTER — RX RENEWAL (OUTPATIENT)
Age: 84
End: 2024-03-05

## 2024-03-05 VITALS
DIASTOLIC BLOOD PRESSURE: 65 MMHG | OXYGEN SATURATION: 98 % | HEART RATE: 63 BPM | HEIGHT: 64 IN | SYSTOLIC BLOOD PRESSURE: 113 MMHG

## 2024-03-05 DIAGNOSIS — R42 DIZZINESS AND GIDDINESS: ICD-10-CM

## 2024-03-05 PROCEDURE — G2211 COMPLEX E/M VISIT ADD ON: CPT

## 2024-03-05 PROCEDURE — 99215 OFFICE O/P EST HI 40 MIN: CPT

## 2024-03-05 PROCEDURE — 93000 ELECTROCARDIOGRAM COMPLETE: CPT

## 2024-03-05 RX ORDER — TORSEMIDE 20 MG/1
20 TABLET ORAL DAILY
Qty: 90 | Refills: 0 | Status: ACTIVE | COMMUNITY
Start: 2024-03-05 | End: 1900-01-01

## 2024-03-05 NOTE — CARDIOLOGY SUMMARY
[de-identified] : Sinus Rhythm  -Right bundle branch block. -Left atrial enlargement. [de-identified] :  2/2021 that showed symptomatic PACs and PAT episodes. There was no AF seen.  [de-identified] : 4/2021 normal LV function w moderate LVH. moderate PHTN 55mmHG 3/2023 normal LV function LVH, mild PHTN  10/27/23 (at rehab) normal LV function.

## 2024-03-05 NOTE — PHYSICAL EXAM
[Well Nourished] : well nourished [Normal Rate] : normal [Rhythm Regular] : regular [Normal S2] : normal S2 [Normal S1] : normal S1 [II] : a grade 2 [___ +] : bilateral [unfilled]U+ pitting edema to the ankles [1+] : left 1+ [Right Carotid Bruit] : no bruit heard over the right carotid [Left Carotid Bruit] : no bruit heard over the left carotid [Clear Lung Fields] : clear lung fields [No Respiratory Distress] : no respiratory distress  [Wheeze ____] : wheeze [unfilled] [Normal] : moves all extremities, no focal deficits, normal speech [de-identified] : in wheelchair [de-identified] : in wheelchair

## 2024-03-05 NOTE — DISCUSSION/SUMMARY
[FreeTextEntry1] : 83 year old woman with a history as listed above presents for a followup visit.  Dee has been recovering well.  She denies any anginal symptoms. Clinically she is compensated from a HF POV. She will decrease torsemide to mg 10mg alternating with 20mg Qday.  Her orthostasis has worsened likely with worsening anemia. She is unable to use compression stockings. WIll increaser her  Midodrine  to 10mg Am, 10mg afternoon and 5 mg PM . I will tolerate a small level of permissive HTN.    She had a Ziopatch in 2/2021 that showed symptomatic PACs and PAT episodes. There was no AF seen. She did not have any signs of heart block or symptomatic bradycardia during the monitoring period.  Cardizem will be stopped today. She will continue  Amiodarone 100mg Qday Her last PFTs in 2/2023 showed severe reduction in diffusion capacity. This may be from Amio. Will have a repeat PFT in 3 months. She continue to follow up with Dr Gallegos.  SHe has now been transitioned to Eliquis 2,5mg Q12.  She will see heme for her aplastic anemia. She gets IVIG. She will fu with heme.  She likely will need a PRBC transfusion.  Her lipid profile is at goal.  Exercise and diet counseling was performed in order to reduce her future cardiovascular risk.  I asked she return for follow up in 2-3 months.      [EKG obtained to assist in diagnosis and management of assessed problem(s)] : EKG obtained to assist in diagnosis and management of assessed problem(s)

## 2024-03-05 NOTE — HISTORY OF PRESENT ILLNESS
[FreeTextEntry1] : 83 year old female with PMhx of AFib (on Warfarin), CKD, COPD, Anemia, DM, HTN, Hiatal Hernia, IBS, PAD, TMJ, Sinus Ermias, PMR on Prednisone who is here for Cardiac Follow up.  She had a kyphoplasty on 9/16/23. Her course was complicated by a large hematoma in her right thigh from her lovenox injection and needed 4 units PRBCs. She [facility]. Had PNA that was treated Recently needed two units of PRBC. She got Lasix post PRBCs and did not feel well with it. She is now taking torsemide 20 mg Qday. She is now on Eliquis.     Since her last visit,  she has been complaining more lightheadedness upon change of positions. She is on the Midodrine 5mg q8. Her Hb is reportedly 8.3 last week.  Her lower extremity edema has resolved. She   denies any dyspnea,  chest pain, PND, orthopnea, lower extremity edema,  strokelike symptoms. She claims to hydrate well.  Medication reconciliation performed. She is compliant with her medications.

## 2024-03-05 NOTE — REVIEW OF SYSTEMS
[Feeling Fatigued] : not feeling fatigued [Dyspnea on exertion] : dyspnea during exertion [Lower Ext Edema] : lower extremity edema [Cough] : no cough [Joint Stiffness] : joint stiffness [Joint Pain] : joint pain [Dizziness] : dizziness [Negative] : Heme/Lymph [FreeTextEntry2] : as per HPI [de-identified] : as per HPI- unchanged

## 2024-03-06 ENCOUNTER — NON-APPOINTMENT (OUTPATIENT)
Age: 84
End: 2024-03-06

## 2024-03-07 ENCOUNTER — OUTPATIENT (OUTPATIENT)
Dept: OUTPATIENT SERVICES | Facility: HOSPITAL | Age: 84
LOS: 1 days | End: 2024-03-07
Payer: MEDICARE

## 2024-03-07 VITALS
TEMPERATURE: 99 F | RESPIRATION RATE: 18 BRPM | DIASTOLIC BLOOD PRESSURE: 68 MMHG | OXYGEN SATURATION: 96 % | SYSTOLIC BLOOD PRESSURE: 108 MMHG | HEART RATE: 63 BPM

## 2024-03-07 VITALS
SYSTOLIC BLOOD PRESSURE: 118 MMHG | RESPIRATION RATE: 18 BRPM | DIASTOLIC BLOOD PRESSURE: 68 MMHG | HEART RATE: 62 BPM | OXYGEN SATURATION: 98 % | TEMPERATURE: 98 F

## 2024-03-07 DIAGNOSIS — Z98.89 OTHER SPECIFIED POSTPROCEDURAL STATES: Chronic | ICD-10-CM

## 2024-03-07 DIAGNOSIS — D63.1 ANEMIA IN CHRONIC KIDNEY DISEASE: ICD-10-CM

## 2024-03-07 DIAGNOSIS — S72.001A FRACTURE OF UNSPECIFIED PART OF NECK OF RIGHT FEMUR, INITIAL ENCOUNTER FOR CLOSED FRACTURE: Chronic | ICD-10-CM

## 2024-03-07 DIAGNOSIS — Z95.828 PRESENCE OF OTHER VASCULAR IMPLANTS AND GRAFTS: Chronic | ICD-10-CM

## 2024-03-07 DIAGNOSIS — H26.40 UNSPECIFIED SECONDARY CATARACT: Chronic | ICD-10-CM

## 2024-03-07 DIAGNOSIS — Z90.710 ACQUIRED ABSENCE OF BOTH CERVIX AND UTERUS: Chronic | ICD-10-CM

## 2024-03-07 DIAGNOSIS — Z98.890 OTHER SPECIFIED POSTPROCEDURAL STATES: Chronic | ICD-10-CM

## 2024-03-07 LAB
BLD GP AB SCN SERPL QL: SIGNIFICANT CHANGE UP
HCT VFR BLD CALC: 24.9 % — LOW (ref 34.5–45)
HGB BLD-MCNC: 7.7 G/DL — LOW (ref 11.5–15.5)
MCHC RBC-ENTMCNC: 30.9 GM/DL — LOW (ref 32–36)
MCHC RBC-ENTMCNC: 33.8 PG — SIGNIFICANT CHANGE UP (ref 27–34)
MCV RBC AUTO: 109.2 FL — HIGH (ref 80–100)
NRBC # BLD: 4 /100 WBCS — HIGH (ref 0–0)
PLATELET # BLD AUTO: 220 K/UL — SIGNIFICANT CHANGE UP (ref 150–400)
RBC # BLD: 2.28 M/UL — LOW (ref 3.8–5.2)
RBC # FLD: 26.1 % — HIGH (ref 10.3–14.5)
WBC # BLD: 6.08 K/UL — SIGNIFICANT CHANGE UP (ref 3.8–10.5)
WBC # FLD AUTO: 6.08 K/UL — SIGNIFICANT CHANGE UP (ref 3.8–10.5)

## 2024-03-07 PROCEDURE — 85027 COMPLETE CBC AUTOMATED: CPT

## 2024-03-07 PROCEDURE — P9040: CPT

## 2024-03-07 PROCEDURE — 86901 BLOOD TYPING SEROLOGIC RH(D): CPT

## 2024-03-07 PROCEDURE — 86923 COMPATIBILITY TEST ELECTRIC: CPT

## 2024-03-07 PROCEDURE — 36415 COLL VENOUS BLD VENIPUNCTURE: CPT

## 2024-03-07 PROCEDURE — 86850 RBC ANTIBODY SCREEN: CPT

## 2024-03-07 PROCEDURE — 36430 TRANSFUSION BLD/BLD COMPNT: CPT

## 2024-03-07 PROCEDURE — 86900 BLOOD TYPING SEROLOGIC ABO: CPT

## 2024-03-07 RX ORDER — ACETAMINOPHEN 500 MG
650 TABLET ORAL ONCE
Refills: 0 | Status: COMPLETED | OUTPATIENT
Start: 2024-03-07 | End: 2024-03-07

## 2024-03-07 RX ORDER — MIDODRINE HYDROCHLORIDE 5 MG/1
5 TABLET ORAL
Qty: 400 | Refills: 2 | Status: ACTIVE | COMMUNITY

## 2024-03-07 RX ORDER — FUROSEMIDE 40 MG
40 TABLET ORAL ONCE
Refills: 0 | Status: COMPLETED | OUTPATIENT
Start: 2024-03-07 | End: 2024-03-07

## 2024-03-07 RX ORDER — DIPHENHYDRAMINE HCL 50 MG
25 CAPSULE ORAL ONCE
Refills: 0 | Status: COMPLETED | OUTPATIENT
Start: 2024-03-07 | End: 2024-03-07

## 2024-03-07 RX ADMIN — Medication 40 MILLIGRAM(S): at 15:10

## 2024-03-07 RX ADMIN — Medication 650 MILLIGRAM(S): at 11:24

## 2024-03-07 RX ADMIN — Medication 300 UNIT(S): at 18:31

## 2024-03-07 RX ADMIN — Medication 25 MILLIGRAM(S): at 11:24

## 2024-03-08 ENCOUNTER — NON-APPOINTMENT (OUTPATIENT)
Age: 84
End: 2024-03-08

## 2024-04-03 ENCOUNTER — APPOINTMENT (OUTPATIENT)
Dept: ORTHOPEDIC SURGERY | Facility: CLINIC | Age: 84
End: 2024-04-03
Payer: MEDICARE

## 2024-04-03 VITALS — BODY MASS INDEX: 25.83 KG/M2 | WEIGHT: 155 LBS | HEIGHT: 65 IN

## 2024-04-03 PROCEDURE — 99214 OFFICE O/P EST MOD 30 MIN: CPT

## 2024-04-03 PROCEDURE — 72100 X-RAY EXAM L-S SPINE 2/3 VWS: CPT

## 2024-04-03 PROCEDURE — 72070 X-RAY EXAM THORAC SPINE 2VWS: CPT

## 2024-04-03 PROCEDURE — 72170 X-RAY EXAM OF PELVIS: CPT

## 2024-04-03 RX ORDER — FUROSEMIDE 40 MG
40 TABLET ORAL ONCE
Refills: 0 | Status: COMPLETED | OUTPATIENT
Start: 2024-04-05 | End: 2024-04-05

## 2024-04-05 ENCOUNTER — OUTPATIENT (OUTPATIENT)
Dept: OUTPATIENT SERVICES | Facility: HOSPITAL | Age: 84
LOS: 1 days | End: 2024-04-05
Payer: MEDICARE

## 2024-04-05 VITALS
SYSTOLIC BLOOD PRESSURE: 113 MMHG | RESPIRATION RATE: 14 BRPM | HEART RATE: 66 BPM | DIASTOLIC BLOOD PRESSURE: 74 MMHG | OXYGEN SATURATION: 97 % | TEMPERATURE: 98 F

## 2024-04-05 VITALS
DIASTOLIC BLOOD PRESSURE: 69 MMHG | TEMPERATURE: 99 F | RESPIRATION RATE: 14 BRPM | SYSTOLIC BLOOD PRESSURE: 107 MMHG | OXYGEN SATURATION: 96 % | HEART RATE: 68 BPM

## 2024-04-05 DIAGNOSIS — Z95.828 PRESENCE OF OTHER VASCULAR IMPLANTS AND GRAFTS: Chronic | ICD-10-CM

## 2024-04-05 DIAGNOSIS — Z98.890 OTHER SPECIFIED POSTPROCEDURAL STATES: Chronic | ICD-10-CM

## 2024-04-05 DIAGNOSIS — D63.1 ANEMIA IN CHRONIC KIDNEY DISEASE: ICD-10-CM

## 2024-04-05 DIAGNOSIS — Z90.710 ACQUIRED ABSENCE OF BOTH CERVIX AND UTERUS: Chronic | ICD-10-CM

## 2024-04-05 DIAGNOSIS — Z98.89 OTHER SPECIFIED POSTPROCEDURAL STATES: Chronic | ICD-10-CM

## 2024-04-05 DIAGNOSIS — H26.40 UNSPECIFIED SECONDARY CATARACT: Chronic | ICD-10-CM

## 2024-04-05 DIAGNOSIS — S72.001A FRACTURE OF UNSPECIFIED PART OF NECK OF RIGHT FEMUR, INITIAL ENCOUNTER FOR CLOSED FRACTURE: Chronic | ICD-10-CM

## 2024-04-05 LAB
BLD GP AB SCN SERPL QL: SIGNIFICANT CHANGE UP
HCT VFR BLD CALC: 22.9 % — LOW (ref 34.5–45)
HGB BLD-MCNC: 7.4 G/DL — LOW (ref 11.5–15.5)
MCHC RBC-ENTMCNC: 32.3 GM/DL — SIGNIFICANT CHANGE UP (ref 32–36)
MCHC RBC-ENTMCNC: 33.8 PG — SIGNIFICANT CHANGE UP (ref 27–34)
MCV RBC AUTO: 104.6 FL — HIGH (ref 80–100)
NRBC # BLD: 4 /100 WBCS — HIGH (ref 0–0)
PLATELET # BLD AUTO: 243 K/UL — SIGNIFICANT CHANGE UP (ref 150–400)
RBC # BLD: 2.19 M/UL — LOW (ref 3.8–5.2)
RBC # FLD: 22 % — HIGH (ref 10.3–14.5)
WBC # BLD: 4.53 K/UL — SIGNIFICANT CHANGE UP (ref 3.8–10.5)
WBC # FLD AUTO: 4.53 K/UL — SIGNIFICANT CHANGE UP (ref 3.8–10.5)

## 2024-04-05 PROCEDURE — 36430 TRANSFUSION BLD/BLD COMPNT: CPT

## 2024-04-05 PROCEDURE — 86923 COMPATIBILITY TEST ELECTRIC: CPT

## 2024-04-05 PROCEDURE — 86900 BLOOD TYPING SEROLOGIC ABO: CPT

## 2024-04-05 PROCEDURE — 86850 RBC ANTIBODY SCREEN: CPT

## 2024-04-05 PROCEDURE — 85027 COMPLETE CBC AUTOMATED: CPT

## 2024-04-05 PROCEDURE — P9040: CPT

## 2024-04-05 PROCEDURE — 36415 COLL VENOUS BLD VENIPUNCTURE: CPT

## 2024-04-05 PROCEDURE — 86901 BLOOD TYPING SEROLOGIC RH(D): CPT

## 2024-04-05 RX ORDER — ACETAMINOPHEN 500 MG
650 TABLET ORAL ONCE
Refills: 0 | Status: COMPLETED | OUTPATIENT
Start: 2024-04-05 | End: 2024-04-05

## 2024-04-05 RX ORDER — DIPHENHYDRAMINE HCL 50 MG
25 CAPSULE ORAL ONCE
Refills: 0 | Status: COMPLETED | OUTPATIENT
Start: 2024-04-05 | End: 2024-04-05

## 2024-04-05 RX ADMIN — Medication 25 MILLIGRAM(S): at 11:08

## 2024-04-05 RX ADMIN — Medication 40 MILLIGRAM(S): at 14:27

## 2024-04-05 RX ADMIN — Medication 100 UNIT(S): at 17:41

## 2024-04-05 RX ADMIN — Medication 650 MILLIGRAM(S): at 11:08

## 2024-04-05 RX ADMIN — Medication 650 MILLIGRAM(S): at 12:08

## 2024-04-05 NOTE — HISTORY OF PRESENT ILLNESS
[Constant] : constant [Leisure] : leisure [Nothing helps with pain getting better] : Nothing helps with pain getting better [0] : 0 [de-identified] : 9/27/23: PO#1 s/p L1 Kyphoplasty 9/16/23- pt reports improvement overall. NWB in wheelchair in the office and staying at a rehab facility since sx.   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 Patient reports she landed on buttock and now having increased mid- low back pain.  s/p Kypho plasty on 9/16/2023- reports pain improved- completed course of rehab.  xrays today: T spine - no obvious new fracture - cement from old kypho in position L spine - no obvious new fracture - cement from old kypho in position R ribs - posslbe rib fracture  4/3/24: here for fu - here as was told new has new fracture on xrays that she obtained for viral bronchiitis   Getting prolia  scheduled for anemia transfusion  cxr from zp - fracture noted at T8 (compared to the xrays from dec it does appear on those xrays)   xrays today: T spine -  t8 fracture noted L spine - no new fracture noted, cement in L1 AP PELVIS- right hip fracture with severe OA  [] : no [FreeTextEntry1] : chest [FreeTextEntry5] : no chest pain. viral bronchitis- pulmonologist ordered chest xray which showed chest compression fracture [FreeTextEntry6] : no chest pain [de-identified] : chest radiograph from pulmonologist [de-identified] : 9/16/23 [de-identified] : Kyphoplasty L1

## 2024-04-05 NOTE — DISCUSSION/SUMMARY
[Medication Risks Reviewed] : Medication risks reviewed [Surgical risks reviewed] : Surgical risks reviewed [de-identified] : rmedical management per medical team for AA  new injury with new pain  t 8 fracture on steroids for PMR with rhuematology  impairs healing capacity   maybe kypho? we discussed this at lenght  she did well with last kypho but given her medical condition would try to avoid  fu 4 weeks

## 2024-04-24 ENCOUNTER — LABORATORY RESULT (OUTPATIENT)
Age: 84
End: 2024-04-24

## 2024-04-29 LAB
ALBUMIN SERPL ELPH-MCNC: 4.5 G/DL
ALP BLD-CCNC: 61 U/L
ALT SERPL-CCNC: 11 U/L
ANION GAP SERPL CALC-SCNC: 14 MMOL/L
AST SERPL-CCNC: 11 U/L
BASOPHILS # BLD AUTO: 0.09 K/UL
BASOPHILS NFR BLD AUTO: 1.8 %
BILIRUB SERPL-MCNC: 0.5 MG/DL
BUN SERPL-MCNC: 31 MG/DL
CALCIUM SERPL-MCNC: 9 MG/DL
CHLORIDE SERPL-SCNC: 103 MMOL/L
CO2 SERPL-SCNC: 23 MMOL/L
CREAT SERPL-MCNC: 1.3 MG/DL
CRP SERPL-MCNC: <3 MG/L
EGFR: 41 ML/MIN/1.73M2
EOSINOPHIL # BLD AUTO: 0 K/UL
EOSINOPHIL NFR BLD AUTO: 0 %
ERYTHROCYTE [SEDIMENTATION RATE] IN BLOOD BY WESTERGREN METHOD: 12 MM/HR
GLUCOSE SERPL-MCNC: 105 MG/DL
HCT VFR BLD CALC: 30.1 %
HGB BLD-MCNC: 9.2 G/DL
LYMPHOCYTES # BLD AUTO: 0.94 K/UL
LYMPHOCYTES NFR BLD AUTO: 19.3 %
MAN DIFF?: NORMAL
MCHC RBC-ENTMCNC: 30.6 GM/DL
MCHC RBC-ENTMCNC: 30.6 PG
MCV RBC AUTO: 100 FL
MONOCYTES # BLD AUTO: 0.54 K/UL
MONOCYTES NFR BLD AUTO: 11 %
NEUTROPHILS # BLD AUTO: 3.18 K/UL
NEUTROPHILS NFR BLD AUTO: 65.2 %
PLATELET # BLD AUTO: 238 K/UL
POTASSIUM SERPL-SCNC: 4.6 MMOL/L
PROT SERPL-MCNC: 6.4 G/DL
RBC # BLD: 3.01 M/UL
RBC # FLD: 25.3 %
SODIUM SERPL-SCNC: 140 MMOL/L
WBC # FLD AUTO: 4.87 K/UL

## 2024-05-03 ENCOUNTER — APPOINTMENT (OUTPATIENT)
Dept: RHEUMATOLOGY | Facility: CLINIC | Age: 84
End: 2024-05-03
Payer: MEDICARE

## 2024-05-03 VITALS
HEART RATE: 74 BPM | HEIGHT: 65 IN | SYSTOLIC BLOOD PRESSURE: 126 MMHG | WEIGHT: 155 LBS | DIASTOLIC BLOOD PRESSURE: 74 MMHG | BODY MASS INDEX: 25.83 KG/M2 | OXYGEN SATURATION: 95 %

## 2024-05-03 PROCEDURE — 99214 OFFICE O/P EST MOD 30 MIN: CPT

## 2024-05-03 RX ORDER — ENOXAPARIN SODIUM 100 MG/ML
100 INJECTION, SOLUTION SUBCUTANEOUS
Qty: 14 | Refills: 0 | Status: DISCONTINUED | COMMUNITY
Start: 2022-11-09 | End: 2024-05-03

## 2024-05-03 RX ORDER — ALBUTEROL SULFATE 90 UG/1
108 (90 BASE) INHALANT RESPIRATORY (INHALATION)
Qty: 1 | Refills: 1 | Status: DISCONTINUED | COMMUNITY
Start: 2022-05-12 | End: 2024-05-03

## 2024-05-03 RX ORDER — METHOCARBAMOL 500 MG/1
500 TABLET, FILM COATED ORAL 3 TIMES DAILY
Qty: 20 | Refills: 0 | Status: DISCONTINUED | COMMUNITY
Start: 2023-08-23 | End: 2024-05-03

## 2024-05-03 RX ORDER — LIDOCAINE 5% 700 MG/1
5 PATCH TOPICAL
Qty: 30 | Refills: 1 | Status: DISCONTINUED | COMMUNITY
Start: 2024-02-12 | End: 2024-05-03

## 2024-05-03 RX ORDER — METAXALONE 800 MG/1
800 TABLET ORAL
Qty: 90 | Refills: 0 | Status: DISCONTINUED | COMMUNITY
Start: 2023-08-31 | End: 2024-05-03

## 2024-05-03 RX ORDER — PREDNISONE 1 MG/1
1 TABLET ORAL
Qty: 120 | Refills: 0 | Status: ACTIVE | COMMUNITY
Start: 2023-06-21 | End: 1900-01-01

## 2024-05-03 RX ORDER — ESOMEPRAZOLE MAGNESIUM 20 MG/1
20 CAPSULE, DELAYED RELEASE ORAL DAILY
Refills: 0 | Status: DISCONTINUED | COMMUNITY
Start: 2022-05-12 | End: 2024-05-03

## 2024-05-03 RX ORDER — HYDROCORTISONE 25 MG/G
2.5 CREAM TOPICAL 3 TIMES DAILY
Qty: 1 | Refills: 3 | Status: DISCONTINUED | COMMUNITY
Start: 2021-08-04 | End: 2024-05-03

## 2024-05-03 RX ORDER — TIZANIDINE 2 MG/1
2 TABLET ORAL
Qty: 30 | Refills: 1 | Status: ACTIVE | COMMUNITY
Start: 2024-05-03 | End: 1900-01-01

## 2024-05-07 DIAGNOSIS — M54.12 RADICULOPATHY, CERVICAL REGION: ICD-10-CM

## 2024-05-07 RX ORDER — GABAPENTIN 400 MG/1
400 CAPSULE ORAL TWICE DAILY
Qty: 180 | Refills: 1 | Status: ACTIVE | COMMUNITY
Start: 2024-05-07 | End: 1900-01-01

## 2024-05-08 ENCOUNTER — APPOINTMENT (OUTPATIENT)
Dept: ORTHOPEDIC SURGERY | Facility: CLINIC | Age: 84
End: 2024-05-08
Payer: MEDICARE

## 2024-05-08 VITALS — BODY MASS INDEX: 25.83 KG/M2 | WEIGHT: 155 LBS | HEIGHT: 65 IN

## 2024-05-08 PROCEDURE — 72100 X-RAY EXAM L-S SPINE 2/3 VWS: CPT

## 2024-05-08 PROCEDURE — 72170 X-RAY EXAM OF PELVIS: CPT

## 2024-05-08 PROCEDURE — 99214 OFFICE O/P EST MOD 30 MIN: CPT

## 2024-05-08 PROCEDURE — 72070 X-RAY EXAM THORAC SPINE 2VWS: CPT

## 2024-05-08 NOTE — HISTORY OF PRESENT ILLNESS
[0] : 0 [Constant] : constant [Leisure] : leisure [Nothing helps with pain getting better] : Nothing helps with pain getting better [de-identified] : 9/27/23: PO#1 s/p L1 Kyphoplasty 9/16/23- pt reports improvement overall. NWB in wheelchair in the office and staying at a rehab facility since sx.   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 Patient reports she landed on buttock and now having increased mid- low back pain.  s/p Kypho plasty on 9/16/2023- reports pain improved- completed course of rehab.  xrays today: T spine - no obvious new fracture - cement from old kypho in position L spine - no obvious new fracture - cement from old kypho in position R ribs - posslbe rib fracture  4/3/24: here for fu - here as was told new has new fracture on xrays that she obtained for viral bronchiitis   Getting prolia  scheduled for anemia transfusion  cxr from zp - fracture noted at T8 (compared to the xrays from dec it does appear on those xrays)   xrays today: T spine -  t8 fracture noted L spine - no new fracture noted, cement in L1 AP PELVIS- right hip fracture with severe OA   5.8.24 Patient here for follow up. pain remains in the back  in wheelchair  pending transfusion for anemia feeling weaker overall on a lower dose of steroid at this point   xrays today: T spine -  t8 fracture noted - possilbe new fracture as well? L spine - no new fracture noted, cement in L1 AP PELVIS- right hip fracture with severe OA  [] : no [FreeTextEntry1] : chest [FreeTextEntry5] : no chest pain. viral bronchitis- pulmonologist ordered chest xray which showed chest compression fracture [FreeTextEntry6] : no chest pain [de-identified] : chest radiograph from pulmonologist [de-identified] : 9/16/23 [de-identified] : Kyphoplasty L1

## 2024-05-08 NOTE — DISCUSSION/SUMMARY
[Medication Risks Reviewed] : Medication risks reviewed [Surgical risks reviewed] : Surgical risks reviewed [de-identified] : reviewed the case and the imaging with her  medical management per medical team for AA  new thorocic fracture? hard to say from the xray - spent some time comparing to the older xrays and hard to say if new fracture on steroids for PMR with rhuematology  impairs healing capacity   maybe kypho? we discussed this  she did well with last kypho but given her medical condition would try to avoid  fu 6 weeks   if shes getting worse consider MRi T/L and can call to set that up if needed

## 2024-05-16 RX ORDER — FUROSEMIDE 40 MG
40 TABLET ORAL ONCE
Refills: 0 | Status: COMPLETED | OUTPATIENT
Start: 2024-05-17 | End: 2024-05-17

## 2024-05-17 ENCOUNTER — OUTPATIENT (OUTPATIENT)
Dept: OUTPATIENT SERVICES | Facility: HOSPITAL | Age: 84
LOS: 1 days | End: 2024-05-17
Payer: MEDICARE

## 2024-05-17 VITALS
SYSTOLIC BLOOD PRESSURE: 146 MMHG | RESPIRATION RATE: 18 BRPM | TEMPERATURE: 98 F | DIASTOLIC BLOOD PRESSURE: 81 MMHG | HEART RATE: 69 BPM

## 2024-05-17 VITALS
TEMPERATURE: 99 F | SYSTOLIC BLOOD PRESSURE: 111 MMHG | RESPIRATION RATE: 16 BRPM | HEART RATE: 69 BPM | DIASTOLIC BLOOD PRESSURE: 49 MMHG

## 2024-05-17 DIAGNOSIS — Z90.710 ACQUIRED ABSENCE OF BOTH CERVIX AND UTERUS: Chronic | ICD-10-CM

## 2024-05-17 DIAGNOSIS — Z98.89 OTHER SPECIFIED POSTPROCEDURAL STATES: Chronic | ICD-10-CM

## 2024-05-17 DIAGNOSIS — S72.001A FRACTURE OF UNSPECIFIED PART OF NECK OF RIGHT FEMUR, INITIAL ENCOUNTER FOR CLOSED FRACTURE: Chronic | ICD-10-CM

## 2024-05-17 DIAGNOSIS — D63.1 ANEMIA IN CHRONIC KIDNEY DISEASE: ICD-10-CM

## 2024-05-17 DIAGNOSIS — Z98.890 OTHER SPECIFIED POSTPROCEDURAL STATES: Chronic | ICD-10-CM

## 2024-05-17 DIAGNOSIS — H26.40 UNSPECIFIED SECONDARY CATARACT: Chronic | ICD-10-CM

## 2024-05-17 DIAGNOSIS — Z95.828 PRESENCE OF OTHER VASCULAR IMPLANTS AND GRAFTS: Chronic | ICD-10-CM

## 2024-05-17 LAB
BLD GP AB SCN SERPL QL: SIGNIFICANT CHANGE UP
HCT VFR BLD CALC: 25.9 % — LOW (ref 34.5–45)
HGB BLD-MCNC: 7.9 G/DL — LOW (ref 11.5–15.5)
MCHC RBC-ENTMCNC: 30.5 GM/DL — LOW (ref 32–36)
MCHC RBC-ENTMCNC: 31.3 PG — SIGNIFICANT CHANGE UP (ref 27–34)
MCV RBC AUTO: 102.8 FL — HIGH (ref 80–100)
NRBC # BLD: 4 /100 WBCS — HIGH (ref 0–0)
PLATELET # BLD AUTO: 201 K/UL — SIGNIFICANT CHANGE UP (ref 150–400)
RBC # BLD: 2.52 M/UL — LOW (ref 3.8–5.2)
RBC # FLD: 28.6 % — HIGH (ref 10.3–14.5)
WBC # BLD: 3.13 K/UL — LOW (ref 3.8–10.5)
WBC # FLD AUTO: 3.13 K/UL — LOW (ref 3.8–10.5)

## 2024-05-17 PROCEDURE — 36415 COLL VENOUS BLD VENIPUNCTURE: CPT

## 2024-05-17 PROCEDURE — P9040: CPT

## 2024-05-17 PROCEDURE — 86900 BLOOD TYPING SEROLOGIC ABO: CPT

## 2024-05-17 PROCEDURE — 36430 TRANSFUSION BLD/BLD COMPNT: CPT

## 2024-05-17 PROCEDURE — 86923 COMPATIBILITY TEST ELECTRIC: CPT

## 2024-05-17 PROCEDURE — 86901 BLOOD TYPING SEROLOGIC RH(D): CPT

## 2024-05-17 PROCEDURE — 85027 COMPLETE CBC AUTOMATED: CPT

## 2024-05-17 PROCEDURE — 86850 RBC ANTIBODY SCREEN: CPT

## 2024-05-17 RX ORDER — DIPHENHYDRAMINE HCL 50 MG
25 CAPSULE ORAL ONCE
Refills: 0 | Status: COMPLETED | OUTPATIENT
Start: 2024-05-17 | End: 2024-05-17

## 2024-05-17 RX ORDER — ACETAMINOPHEN 500 MG
650 TABLET ORAL ONCE
Refills: 0 | Status: COMPLETED | OUTPATIENT
Start: 2024-05-17 | End: 2024-05-17

## 2024-05-17 RX ADMIN — Medication 650 MILLIGRAM(S): at 11:07

## 2024-05-17 RX ADMIN — Medication 25 MILLIGRAM(S): at 11:07

## 2024-05-17 RX ADMIN — Medication 40 MILLIGRAM(S): at 15:01

## 2024-05-17 RX ADMIN — Medication 100 UNIT(S): at 18:33

## 2024-05-24 ENCOUNTER — NON-APPOINTMENT (OUTPATIENT)
Age: 84
End: 2024-05-24

## 2024-05-24 ENCOUNTER — APPOINTMENT (OUTPATIENT)
Dept: CARDIOLOGY | Facility: CLINIC | Age: 84
End: 2024-05-24
Payer: MEDICARE

## 2024-05-24 VITALS
HEART RATE: 74 BPM | OXYGEN SATURATION: 95 % | DIASTOLIC BLOOD PRESSURE: 69 MMHG | HEIGHT: 65 IN | SYSTOLIC BLOOD PRESSURE: 123 MMHG

## 2024-05-24 DIAGNOSIS — R00.2 PALPITATIONS: ICD-10-CM

## 2024-05-24 PROCEDURE — 93000 ELECTROCARDIOGRAM COMPLETE: CPT

## 2024-05-24 PROCEDURE — G2211 COMPLEX E/M VISIT ADD ON: CPT

## 2024-05-24 PROCEDURE — 99214 OFFICE O/P EST MOD 30 MIN: CPT

## 2024-05-24 NOTE — HISTORY OF PRESENT ILLNESS
[FreeTextEntry1] : 83 year old female with PMhx of AFib (on AC), DVT.  CKD, COPD, Anemia, DM, HTN, Hiatal Hernia, IBS, PAD, TMJ, Sinus Ermias, PMR on Prednisone /   She had a kyphoplasty on 9/16/23. Her course was complicated by a large hematoma in her right thigh from her lovenox injection and needed 4 units PRBCs. She went to [facility] rehab. Had PNA that was treated Recently needed two units of PRBC. She got Lasix post PRBCs and did not feel well with it. She is now taking torsemide 20 mg Qday. She is now on Eliquis.     Since her last visit,  she has been requiring more blood transfusions. No reported melena, hematochezia or hematemesis.   Her lightheadness has improved on increased Midodrone. . Her lower extremity edema has resolved. She   denies any dyspnea,  chest pain, PND, orthopnea, lower extremity edema,  strokelike symptoms. She claims to hydrate well.  Medication reconciliation performed. She is compliant with her medications.

## 2024-05-24 NOTE — PHYSICAL EXAM
[Well Nourished] : well nourished [Normal Rate] : normal [Rhythm Regular] : regular [Normal S1] : normal S1 [Normal S2] : normal S2 [II] : a grade 2 [___ +] : bilateral [unfilled]U+ pitting edema to the ankles [1+] : left 1+ [Right Carotid Bruit] : no bruit heard over the right carotid [Left Carotid Bruit] : no bruit heard over the left carotid [Clear Lung Fields] : clear lung fields [No Respiratory Distress] : no respiratory distress  [Wheeze ____] : wheeze [unfilled] [Normal] : alert and oriented, normal memory [de-identified] : in wheelchair [de-identified] : in wheelchair

## 2024-05-24 NOTE — REVIEW OF SYSTEMS
[Feeling Fatigued] : not feeling fatigued [Dyspnea on exertion] : dyspnea during exertion [Lower Ext Edema] : lower extremity edema [Cough] : no cough [Joint Pain] : joint pain [Joint Stiffness] : joint stiffness [Dizziness] : dizziness [Negative] : Heme/Lymph [FreeTextEntry2] : as per HPI [de-identified] : as per HPI- unchanged

## 2024-05-24 NOTE — CARDIOLOGY SUMMARY
[de-identified] : Sinus Rhythm -Short NJ syndrome  rsr LAFB -Nonspecific T-abnormality. [de-identified] :  2/2021 that showed symptomatic PACs and PAT episodes. There was no AF seen.  [de-identified] : 4/2021 normal LV function w moderate LVH. moderate PHTN 55mmHG 3/2023 normal LV function LVH, mild PHTN  10/27/23 (at rehab) normal LV function.

## 2024-05-24 NOTE — DISCUSSION/SUMMARY
[FreeTextEntry1] : 83 year old woman with a history as listed above presents for a followup visit.  Dee has been recovering well.  She denies any anginal symptoms. Clinically she is compensated from a HF POV.  She will continue Torsemide  10mg  Qday.  Her orthostasis is relatively stable and worsened with worsening anemia. She is unable to use compression stockings. WIll increaser her  Midodrine  to 10mg Am, 10mg afternoon and 5 mg PM . I will tolerate a small level of permissive HTN.    She had a Ziopatch in 2/2021 that showed symptomatic PACs and PAT episodes. There was no AF seen. She did not have any signs of heart block or symptomatic bradycardia during the monitoring period.  Cardizem will be stopped today. She will continue  Amiodarone 100mg Qday Her last PFTs in 2/2023 showed severe reduction in diffusion capacity. This may be from Amio. Will have a repeat PFT in 3 months. She continue to follow up with Dr Gallegos.   She will continue Eliquis 2,5mg Q12.  She will see heme for her aplastic anemia. She gets IVIG. She will fu with heme.  She is asking about a Watchman. I am ok with proceeding but she has a history of DVT.  Her lipid profile is at goal.  Exercise and diet counseling was performed in order to reduce her future cardiovascular risk.  I asked she return for follow up in 3 months.      [EKG obtained to assist in diagnosis and management of assessed problem(s)] : EKG obtained to assist in diagnosis and management of assessed problem(s)

## 2024-05-30 NOTE — ED PROVIDER NOTE - NS_EDPROVIDERDISPOUSERTYPE_ED_A_ED
Leticia Ann is a 34 year old female.     Chief Complaint   Patient presents with    Lab Results    Hyperlipidemia         HPI:       URI:  Started 2 weeks ago.  Much better than this past weekend.      Hypercholesterolemia:  Has been diligently working on her diet.  Eating more veggies.          Wt Readings from Last 6 Encounters:   05/30/24 153 lb (69.4 kg)   05/27/24 150 lb (68 kg)   05/27/24 150 lb (68 kg)   05/24/24 150 lb (68 kg)   03/28/24 153 lb (69.4 kg)   02/15/24 155 lb 3.2 oz (70.4 kg)      Body mass index is 27.54 kg/m².        No current outpatient medications on file.      Past Medical History:    Visual impairment    contact lenses and glasses      History reviewed. No pertinent surgical history.   Social History:    Social History     Socioeconomic History    Marital status:    Tobacco Use    Smoking status: Never    Smokeless tobacco: Never   Vaping Use    Vaping status: Never Used   Substance and Sexual Activity    Alcohol use: Not Currently    Drug use: Never    Sexual activity: Yes     Partners: Male   Other Topics Concern    Caffeine Concern Yes     Comment: 1-3 cups weekly    Exercise Yes    Seat Belt Yes     Social Determinants of Health     Financial Resource Strain: Low Risk  (3/4/2023)    Financial Resource Strain     Difficulty of Paying Living Expenses: Not hard at all     Med Affordability: No   Food Insecurity: No Food Insecurity (3/4/2023)    Food Insecurity     Food Insecurity: Never true   Transportation Needs: No Transportation Needs (3/4/2023)    Transportation Needs     Lack of Transportation: No   Stress: No Stress Concern Present (3/4/2023)    Stress     Feeling of Stress : No   Housing Stability: Low Risk  (3/4/2023)    Housing Stability     Housing Instability: No         Family History   Problem Relation Age of Onset    Hypertension Father     Lipids Father     Heart Disorder Father     No Known Problems Mother     Cancer Maternal Grandmother     No Known  Problems Maternal Grandfather     Heart Disorder Paternal Grandmother     Hypertension Paternal Grandmother     No Known Problems Paternal Grandfather     Cancer Paternal Aunt      REVIEW OF SYSTEMS:   GENERAL HEALTH: Overall in better  RESPIRATORY: Denies: NO/CERON  NEURO: denies headaches/dizziness  PSYCH: denies depression and anxiety    Immunization History   Administered Date(s) Administered    MMR 10/31/2020    TDAP 08/07/2020, 12/15/2022   Pended Date(s) Pended    Influenza Vaccine Refused 02/15/2024       EXAM:   /64   Temp 97.7 °F (36.5 °C) (Temporal)   Resp 18   Ht 5' 2.5\" (1.588 m)   Wt 153 lb (69.4 kg)   LMP 05/26/2024 (Exact Date)   SpO2 98%   BMI 27.54 kg/m²   GENERAL: NAD, pleasant minimally ill appearing.  Voice is mildly denasally.  SKIN: no visible rashes  HEAD: NCAT  Ears: EACs clear bilaterally.  Right TM appears a little \"crinkly\" but no fluid level line or fluid discoloration appreciated and no erythema.  Left EAC clear and left TM normal.  NECK: supple, no adenopathy, no thyromegaly, no masses  VASCULAR: No lower extremity edema  NEURO: Alert and Oriented x3.  No gross motor or gross sensory abnormalities.  PSYCH: Affect normal.  Normal thought content.        DATA:    Component      Latest Ref Rng 6/15/2019 2/24/2024 5/25/2024   Cholesterol, Total      <200 mg/dL 194  242 (H)  195    HDL Cholesterol      40 - 59 mg/dL 47  58  49    Triglycerides      30 - 149 mg/dL 74  48  91    LDL Cholesterol Calc      <100 mg/dL 132 (H)  177 (H)  129 (H)    VLDL      0 - 30 mg/dL 15  10  16    NON-HDL CHOLESTEROL      <130 mg/dL 147 (H)  184 (H)  146 (H)    Patient Fasting for Lipid?  No  Yes             ASSESSMENT AND PLAN:       Encounter Diagnoses   Name Primary?    Hypercholesterolemia Yes    Viral upper respiratory tract infection        1. Hypercholesterolemia  LDL significantly improved by 48 points.  Patient encouraged to continue to work on diet.  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  Recommend reevaluate lipid panel at time of annual wellness visit next year.      2. Viral upper respiratory tract infection  Improving.  Okay to take OTCs for symptomatic relief.  Push fluids.          Return in about 8 months (around 1/30/2025) for Annual wellness visit.  Sooner if needed..       Attending Attestation (For Attendings USE Only)...

## 2024-06-10 RX ORDER — AMIODARONE HYDROCHLORIDE 200 MG/1
200 TABLET ORAL DAILY
Qty: 90 | Refills: 2 | Status: ACTIVE | COMMUNITY
Start: 2022-05-12

## 2024-06-10 NOTE — ED PROVIDER NOTE - NSTIMEPROVIDERCAREINITIATE_GEN_ER
Chad Bonilla  Oncology Social Work Encounter    [] Med-Onc [facility] [] Med-Onc [facility] [] Med-Onc [facility] [] Rad-Onc [facility] [] Rad-Onc [facility] [x] Rad-Onc [facility] [] Rad-[facility] [] [facility]    Patient: Priya Christensen    Encounter Type:    [x] Initial SW Encounter  [] Patient Initiated  [] Referral  [x] Distress/PHQ Screening  [] Other:      Concern(s)/Barrier(s) to Care: Scored a 6 on distress tool    Narrative:    spoke to patient over the phone. Patient stated that she feels \"alright\" and that she has felt alright before, during, and after her consult appointment. Patient reports that she has insurance and plans to drive herself to and from radiation appointments. When asked about supports, patients expressed a limited support system. When offered, patient stated she was not interested in joining a support group or talking to someone at this time. Patient reports she will reach out if she changes her mind.      Referral/Handouts: declined      Plan: Follow up as needed.   05-Oct-2022 13:20

## 2024-06-12 ENCOUNTER — RX RENEWAL (OUTPATIENT)
Age: 84
End: 2024-06-12

## 2024-06-18 ENCOUNTER — APPOINTMENT (OUTPATIENT)
Dept: INTERNAL MEDICINE | Facility: CLINIC | Age: 84
End: 2024-06-18
Payer: MEDICARE

## 2024-06-18 VITALS
HEART RATE: 64 BPM | TEMPERATURE: 98.2 F | OXYGEN SATURATION: 97 % | RESPIRATION RATE: 16 BRPM | WEIGHT: 155 LBS | SYSTOLIC BLOOD PRESSURE: 112 MMHG | BODY MASS INDEX: 25.83 KG/M2 | DIASTOLIC BLOOD PRESSURE: 66 MMHG | HEIGHT: 65 IN

## 2024-06-18 DIAGNOSIS — D17.20 BENIGN LIPOMATOUS NEOPLASM OF SKIN AND SUBCUTANEOUS TISSUE OF UNSPECIFIED LIMB: ICD-10-CM

## 2024-06-18 DIAGNOSIS — I10 ESSENTIAL (PRIMARY) HYPERTENSION: ICD-10-CM

## 2024-06-18 DIAGNOSIS — D64.9 ANEMIA, UNSPECIFIED: ICD-10-CM

## 2024-06-18 PROCEDURE — G2211 COMPLEX E/M VISIT ADD ON: CPT

## 2024-06-18 PROCEDURE — 99214 OFFICE O/P EST MOD 30 MIN: CPT

## 2024-06-18 NOTE — HEALTH RISK ASSESSMENT
[Never (0 pts)] : Never (0 points) [No] : In the past 12 months have you used drugs other than those required for medical reasons? No [No falls in past year] : Patient reported no falls in the past year [Assistive Device] : Patient uses an assistive device [Little interest or pleasure doing things] : 1) Little interest or pleasure doing things [Feeling down, depressed, or hopeless] : 2) Feeling down, depressed, or hopeless [0] : 2) Feeling down, depressed, or hopeless: Not at all (0) [PHQ-2 Negative - No further assessment needed] : PHQ-2 Negative - No further assessment needed [Former] : Former [de-identified] : wheelchair  [[id number]] : 0

## 2024-06-18 NOTE — PLAN
[FreeTextEntry1] : continue medications  chronic medical conditions HTN DM  Follow up with cardiologist To see Electrophysiologist Follow up with orthopedist Dr. Wilson for lump  Return in 4 months

## 2024-06-18 NOTE — PHYSICAL EXAM
[No Acute Distress] : no acute distress [Well Nourished] : well nourished [Well Developed] : well developed [Well-Appearing] : well-appearing [Normal Voice/Communication] : normal voice/communication [Normal Sclera/Conjunctiva] : normal sclera/conjunctiva [PERRL] : pupils equal round and reactive to light [EOMI] : extraocular movements intact [Normal Outer Ear/Nose] : the outer ears and nose were normal in appearance [Normal Oropharynx] : the oropharynx was normal [Normal TMs] : both tympanic membranes were normal [No JVD] : no jugular venous distention [No Lymphadenopathy] : no lymphadenopathy [Supple] : supple [Thyroid Normal, No Nodules] : the thyroid was normal and there were no nodules present [No Respiratory Distress] : no respiratory distress  [No Accessory Muscle Use] : no accessory muscle use [Clear to Auscultation] : lungs were clear to auscultation bilaterally [Normal Rate] : normal rate  [Regular Rhythm] : with a regular rhythm [Normal S1, S2] : normal S1 and S2 [No Murmur] : no murmur heard [No Carotid Bruits] : no carotid bruits [No Abdominal Bruit] : a ~M bruit was not heard ~T in the abdomen [No Varicosities] : no varicosities [Pedal Pulses Present] : the pedal pulses are present [No Edema] : there was no peripheral edema [No Palpable Aorta] : no palpable aorta [No Extremity Clubbing/Cyanosis] : no extremity clubbing/cyanosis [Soft] : abdomen soft [Non Tender] : non-tender [Non-distended] : non-distended [No Masses] : no abdominal mass palpated [No HSM] : no HSM [Normal Bowel Sounds] : normal bowel sounds [Normal Supraclavicular Nodes] : no supraclavicular lymphadenopathy [Normal Posterior Cervical Nodes] : no posterior cervical lymphadenopathy [Normal Anterior Cervical Nodes] : no anterior cervical lymphadenopathy [No CVA Tenderness] : no CVA  tenderness [No Spinal Tenderness] : no spinal tenderness [No Joint Swelling] : no joint swelling [Grossly Normal Strength/Tone] : grossly normal strength/tone [No Rash] : no rash [Coordination Grossly Intact] : coordination grossly intact [No Focal Deficits] : no focal deficits [Normal Gait] : normal gait [Deep Tendon Reflexes (DTR)] : deep tendon reflexes were 2+ and symmetric [Speech Grossly Normal] : speech grossly normal [Memory Grossly Normal] : memory grossly normal [Normal Affect] : the affect was normal [Alert and Oriented x3] : oriented to person, place, and time [Normal Mood] : the mood was normal [Normal Insight/Judgement] : insight and judgment were intact [de-identified] : lump nodule  left arm

## 2024-06-18 NOTE — END OF VISIT
[FreeTextEntry3] : "I, Harris Richards, personally scribed the services dictated to me by Dr. Severiano Rodas MD in this documentation on 06/18/2024"   "I Dr. Severiano Rodas MD, personally performed the services described in this documentation on 06/18/2024 for the patient as scribed by Harris Richards in my presence. I have reviewed and verified that all the information is accurate and true."

## 2024-06-18 NOTE — HISTORY OF PRESENT ILLNESS
[Family Member] : family member [FreeTextEntry1] : follow up  [de-identified] : MARTINEZ JONES is a 83 year old F who presents today for follow up for HTN and DM. Pt has a hx of CKD and COPD. Pt recently saw her cardiologist and was advised she may need a Watchman due to the Eliquis not working. Pt is scheduled to be evaluated by the electrophysiologist next month. Pt complains of a painful lump on her left arm. needs transfusion

## 2024-06-19 ENCOUNTER — APPOINTMENT (OUTPATIENT)
Dept: ORTHOPEDIC SURGERY | Facility: CLINIC | Age: 84
End: 2024-06-19
Payer: MEDICARE

## 2024-06-19 VITALS — WEIGHT: 155 LBS | BODY MASS INDEX: 25.83 KG/M2 | HEIGHT: 65 IN

## 2024-06-19 DIAGNOSIS — S22.060D WEDGE COMPRESSION FRACTURE OF T7-T8 VERTEBRA, SUBSEQUENT ENCOUNTER FOR FRACTURE WITH ROUTINE HEALING: ICD-10-CM

## 2024-06-19 DIAGNOSIS — S32.020G WEDGE COMPRESSION FRACTURE OF SECOND LUMBAR VERTEBRA, SUBSEQUENT ENCOUNTER FOR FRACTURE WITH DELAYED HEALING: ICD-10-CM

## 2024-06-19 PROCEDURE — 72100 X-RAY EXAM L-S SPINE 2/3 VWS: CPT

## 2024-06-19 PROCEDURE — 72070 X-RAY EXAM THORAC SPINE 2VWS: CPT

## 2024-06-19 PROCEDURE — 99214 OFFICE O/P EST MOD 30 MIN: CPT

## 2024-06-19 NOTE — DISCUSSION/SUMMARY
[Medication Risks Reviewed] : Medication risks reviewed [Surgical risks reviewed] : Surgical risks reviewed [de-identified] : reviewed the case and the imaging with her  old and new xrays  medical management per medical team for AA   on steroids for PMR with rhuematology   if shes getting worse consider MRi T/L and can call to set that up if needed

## 2024-06-19 NOTE — HISTORY OF PRESENT ILLNESS
[0] : 0 [Constant] : constant [Leisure] : leisure [Nothing helps with pain getting better] : Nothing helps with pain getting better [de-identified] : 9/27/23: PO#1 s/p L1 Kyphoplasty 9/16/23- pt reports improvement overall. NWB in wheelchair in the office and staying at a rehab facility since sx.   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 Patient reports she landed on buttock and now having increased mid- low back pain.  s/p Kypho plasty on 9/16/2023- reports pain improved- completed course of rehab.  xrays today: T spine - no obvious new fracture - cement from old kypho in position L spine - no obvious new fracture - cement from old kypho in position R ribs - posslbe rib fracture  4/3/24: here for fu - here as was told new has new fracture on xrays that she obtained for viral bronchiitis   Getting prolia  scheduled for anemia transfusion  cxr from zp - fracture noted at T8 (compared to the xrays from dec it does appear on those xrays)   xrays today: T spine -  t8 fracture noted L spine - no new fracture noted, cement in L1 AP PELVIS- right hip fracture with severe OA   5.8.24 Patient here for follow up. pain remains in the back  in wheelchair  pending transfusion for anemia feeling weaker overall on a lower dose of steroid at this point   xrays today: T spine -  t8 fracture noted - possilbe new fracture as well? L spine - no new fracture noted, cement in L1 AP PELVIS- right hip fracture with severe OA   6.19.24: here for fu - pain remains in the right side of the lower back - in WC   on steroids   xrays today: T spine -  t8 fracture noted - no new fracture nted L spine - no new fracture noted, cement in L1 (compared to the older xray) [] : no [FreeTextEntry1] : chest [FreeTextEntry5] : no chest pain. viral bronchitis- pulmonologist ordered chest xray which showed chest compression fracture [FreeTextEntry6] : no chest pain [de-identified] : chest radiograph from pulmonologist [de-identified] : 9/16/23 [de-identified] : Kyphoplasty L1

## 2024-07-01 ENCOUNTER — NON-APPOINTMENT (OUTPATIENT)
Age: 84
End: 2024-07-01

## 2024-07-01 ENCOUNTER — APPOINTMENT (OUTPATIENT)
Dept: ELECTROPHYSIOLOGY | Facility: CLINIC | Age: 84
End: 2024-07-01
Payer: MEDICARE

## 2024-07-01 VITALS — HEART RATE: 66 BPM | OXYGEN SATURATION: 95 % | DIASTOLIC BLOOD PRESSURE: 75 MMHG | SYSTOLIC BLOOD PRESSURE: 130 MMHG

## 2024-07-01 PROCEDURE — 93000 ELECTROCARDIOGRAM COMPLETE: CPT

## 2024-07-01 PROCEDURE — 99204 OFFICE O/P NEW MOD 45 MIN: CPT

## 2024-07-22 ENCOUNTER — RX RENEWAL (OUTPATIENT)
Age: 84
End: 2024-07-22

## 2024-07-22 RX ORDER — LEFLUNOMIDE 10 MG/1
10 TABLET, FILM COATED ORAL
Qty: 90 | Refills: 0 | Status: ACTIVE | COMMUNITY
Start: 2024-07-19 | End: 1900-01-01

## 2024-07-24 ENCOUNTER — APPOINTMENT (OUTPATIENT)
Dept: CARDIOLOGY | Facility: CLINIC | Age: 84
End: 2024-07-24
Payer: MEDICARE

## 2024-07-24 ENCOUNTER — NON-APPOINTMENT (OUTPATIENT)
Age: 84
End: 2024-07-24

## 2024-07-24 VITALS
SYSTOLIC BLOOD PRESSURE: 111 MMHG | OXYGEN SATURATION: 92 % | HEIGHT: 65 IN | HEART RATE: 72 BPM | DIASTOLIC BLOOD PRESSURE: 68 MMHG

## 2024-07-24 DIAGNOSIS — J84.112 IDIOPATHIC PULMONARY FIBROSIS: ICD-10-CM

## 2024-07-24 DIAGNOSIS — M35.3 POLYMYALGIA RHEUMATICA: ICD-10-CM

## 2024-07-24 PROCEDURE — 93000 ELECTROCARDIOGRAM COMPLETE: CPT

## 2024-07-24 PROCEDURE — 99214 OFFICE O/P EST MOD 30 MIN: CPT

## 2024-07-24 PROCEDURE — G2211 COMPLEX E/M VISIT ADD ON: CPT

## 2024-07-24 NOTE — HISTORY OF PRESENT ILLNESS
[FreeTextEntry1] : 83 year old female with PMhx of AFib (on AC), DVT.  CKD, COPD, Anemia, DM, HTN, Hiatal Hernia, IBS, PAD, TMJ, Sinus Ermias, PMR on Prednisone /   Since her last visit,  she is now pending a left arm surgery for removal of a growth. Her Hb is stabilized. She is not a watchman candidate. . No reported melena, hematochezia or hematemesis.   Her lightheadness has resolved with improved with Hb. Her lower extremity edema has resolved.  She   denies any dyspnea,  chest pain, PND, orthopnea, lower extremity edema,  strokelike symptoms. She claims to hydrate well. SHe is off of the steroids. She is on leflunomide.  Medication reconciliation performed. She is compliant with her medications.

## 2024-07-24 NOTE — CARDIOLOGY SUMMARY
[de-identified] : Sinus Rhythm   [de-identified] :  2/2021 that showed symptomatic PACs and PAT episodes. There was no AF seen.  [de-identified] : 4/2021 normal LV function w moderate LVH. moderate PHTN 55mmHG 3/2023 normal LV function LVH, mild PHTN  10/27/23 (at rehab) normal LV function.

## 2024-07-24 NOTE — PHYSICAL EXAM
[Well Nourished] : well nourished [Normal Rate] : normal [Rhythm Regular] : regular [Normal S1] : normal S1 [Normal S2] : normal S2 [II] : a grade 2 [___ +] : bilateral [unfilled]U+ pitting edema to the ankles [1+] : left 1+ [Clear Lung Fields] : clear lung fields [No Respiratory Distress] : no respiratory distress  [Wheeze ____] : wheeze [unfilled] [Normal] : alert and oriented, normal memory [Right Carotid Bruit] : no bruit heard over the right carotid [Left Carotid Bruit] : no bruit heard over the left carotid [de-identified] : in wheelchair [de-identified] : in wheelchair

## 2024-07-24 NOTE — REVIEW OF SYSTEMS
[Dyspnea on exertion] : dyspnea during exertion [Lower Ext Edema] : lower extremity edema [Joint Pain] : joint pain [Joint Stiffness] : joint stiffness [Dizziness] : dizziness [Negative] : Heme/Lymph [Feeling Fatigued] : not feeling fatigued [Cough] : no cough [FreeTextEntry2] : as per HPI [de-identified] : as per HPI- unchanged

## 2024-07-24 NOTE — DISCUSSION/SUMMARY
[FreeTextEntry1] : 83 year old woman with a history as listed above presents for a followup visit.  Dee has been recovering well.  She denies any anginal symptoms. Clinically she is compensated from a HF POV.  She will continue Torsemide  10mg  Qday.  Her orthostasis better now that her Hb has stabilized >9. She is unable to use compression stockings.. She will continue Midodrine   10mg Am, and 5 mg PM . I will tolerate a small level of permissive HTN.    She had a Ziopatch in 2/2021 that showed symptomatic PACs and PAT episodes. There was no AF seen. She did not have any signs of heart block or symptomatic bradycardia during the monitoring period.  Cardizem will be stopped today. She will continue  Amiodarone 100mg Qday Her last PFTs in 2/2023 showed severe reduction in diffusion capacity. This may be from Amio. Will have a repeat PFT in 3 months. She continue to follow up with Dr Gallegos.   She will continue Eliquis 2,5mg Q12.  She will see rafal for her aplastic anemia. She gets IVIG. She will fu with heme.  She is not a watchman candidate.  Her lipid profile is at goal.   She currently has no active cardiac conditions. She may proceed with the planned low risk arm surgery without any further cardiac workup. Routine hemodynamic monitoring is suggested during the procedure.  She can hold her eliquis for 2 days prior to surgery and then resume after.  Exercise and diet counseling was performed in order to reduce her future cardiovascular risk.  I asked she return for follow up in 3 months.      [EKG obtained to assist in diagnosis and management of assessed problem(s)] : EKG obtained to assist in diagnosis and management of assessed problem(s)

## 2024-07-29 ENCOUNTER — APPOINTMENT (OUTPATIENT)
Dept: INTERNAL MEDICINE | Facility: CLINIC | Age: 84
End: 2024-07-29
Payer: MEDICARE

## 2024-07-29 VITALS
SYSTOLIC BLOOD PRESSURE: 106 MMHG | TEMPERATURE: 98.2 F | HEIGHT: 65 IN | HEART RATE: 65 BPM | RESPIRATION RATE: 16 BRPM | DIASTOLIC BLOOD PRESSURE: 54 MMHG | OXYGEN SATURATION: 97 %

## 2024-07-29 DIAGNOSIS — I48.0 PAROXYSMAL ATRIAL FIBRILLATION: ICD-10-CM

## 2024-07-29 DIAGNOSIS — I10 ESSENTIAL (PRIMARY) HYPERTENSION: ICD-10-CM

## 2024-07-29 DIAGNOSIS — Z01.818 ENCOUNTER FOR OTHER PREPROCEDURAL EXAMINATION: ICD-10-CM

## 2024-07-29 PROCEDURE — 99214 OFFICE O/P EST MOD 30 MIN: CPT

## 2024-07-29 NOTE — PHYSICAL EXAM
[No Acute Distress] : no acute distress [Well Nourished] : well nourished [Well Developed] : well developed [Well-Appearing] : well-appearing [Normal Sclera/Conjunctiva] : normal sclera/conjunctiva [PERRL] : pupils equal round and reactive to light [EOMI] : extraocular movements intact [Normal Outer Ear/Nose] : the outer ears and nose were normal in appearance [Normal Oropharynx] : the oropharynx was normal [No JVD] : no jugular venous distention [No Lymphadenopathy] : no lymphadenopathy [Supple] : supple [Thyroid Normal, No Nodules] : the thyroid was normal and there were no nodules present [No Respiratory Distress] : no respiratory distress  [No Accessory Muscle Use] : no accessory muscle use [Clear to Auscultation] : lungs were clear to auscultation bilaterally [Normal Rate] : normal rate  [Regular Rhythm] : with a regular rhythm [Normal S1, S2] : normal S1 and S2 [No Murmur] : no murmur heard [No Carotid Bruits] : no carotid bruits [No Abdominal Bruit] : a ~M bruit was not heard ~T in the abdomen [No Varicosities] : no varicosities [Pedal Pulses Present] : the pedal pulses are present [No Edema] : there was no peripheral edema [No Palpable Aorta] : no palpable aorta [No Extremity Clubbing/Cyanosis] : no extremity clubbing/cyanosis [Soft] : abdomen soft [Non Tender] : non-tender [Non-distended] : non-distended [No Masses] : no abdominal mass palpated [No HSM] : no HSM [Normal Bowel Sounds] : normal bowel sounds [Normal Posterior Cervical Nodes] : no posterior cervical lymphadenopathy [Normal Anterior Cervical Nodes] : no anterior cervical lymphadenopathy [No CVA Tenderness] : no CVA  tenderness [No Spinal Tenderness] : no spinal tenderness [No Joint Swelling] : no joint swelling [Grossly Normal Strength/Tone] : grossly normal strength/tone [No Rash] : no rash [Coordination Grossly Intact] : coordination grossly intact [No Focal Deficits] : no focal deficits [Normal Gait] : normal gait [Deep Tendon Reflexes (DTR)] : deep tendon reflexes were 2+ and symmetric [Normal Affect] : the affect was normal [Normal Insight/Judgement] : insight and judgment were intact [de-identified] : skin lesion left forearm

## 2024-07-29 NOTE — HISTORY OF PRESENT ILLNESS
[COPD] : COPD [Chronic Kidney Disease] : chronic kidney disease [Diabetes] : diabetes [Family Member] : no family member with adverse anesthesia reaction/sudden death [Self] : no previous adverse anesthesia reaction [Chronic Anticoagulation] : no chronic anticoagulation [FreeTextEntry1] : Excision Soft Tissue Mass Left Forearm [FreeTextEntry2] : 8/6/2024 [FreeTextEntry3] : Dr. Emil Wilson [FreeTextEntry4] :  MARTINEZ JONES is a 83 year old F who presents today for medical clearance. Pt has a hx of HTN and HLD.

## 2024-07-29 NOTE — HISTORY OF PRESENT ILLNESS
Patient arrived to unit via stretcher from ED. Patient pulled over to bed with assistance . Patient oriented to room, bed in lowest position, wheels locked, bed rails up x2, bed alarm initiated, call light and bedside table within reach. Safety measures addressed. Family at bedside.  
none
[FreeTextEntry1] : She is now here for a followup visit.\par Since her last visit she has noted worsening hip pain. She is now pending an MRI of her spine per her pain management. SHe is considering doing PT.  \par \par She has more intermittent palpitations that last for seconds happening randomly over a course of a week. It has not changed. \par \par She is still complaining of intermittent lightheadedness which is happening when she changes positions especially after sitting for prolong periods of time. The symptoms last for about 1 minute. She has not had any syncope. It unchanged from previous. \par \par Her lower extremity edema has essentially resolved.  She  denies any chest pain, PND, orthopnea,  syncope  strokelike symptoms. No reported melena, hematochezia or hematemesis. \par \par She is compliant with her other medications. \par

## 2024-07-29 NOTE — ASSESSMENT
[Patient Optimized for Surgery] : Patient optimized for surgery [No Further Testing Recommended] : no further testing recommended [Modify anticoagulant treatment prior to procedure] : Modify anticoagulant treatment prior to procedure [Modify medications prior to procedure] : Modify medications prior to procedure [FreeTextEntry4] : Cleared for surgery Cleared by cardiologist [FreeTextEntry5] : Hold Eliquis two days prior to procedure [FreeTextEntry7] : Hold Torsemide morning of procedure,

## 2024-07-29 NOTE — END OF VISIT
[Time Spent: ___ minutes] : I have spent [unfilled] minutes of time on the encounter. [FreeTextEntry3] : "I, Harris Richards, personally scribed the services dictated to me by Dr. Severiano Rodas MD in this documentation on 07/29/2024"   "I Dr. Severiano Rodas MD, personally performed the services described in this documentation on 07/29/2024 for the patient as scribed by Harris Richards in my presence. I have reviewed and verified that all the information is accurate and true."

## 2024-07-29 NOTE — RESULTS/DATA
[] : results reviewed [ECG Reviewed] : reviewed [NSR] : normal sinus rhythm [Low Voltage] : low voltage [Precordial Leads] : in the precordial leads [Normal] : The 12 - lead ECG is normal [de-identified] : WBC: 3.12 RBC: 3.21 HCT: 33.5 HGB: 10.4 [de-identified] : Glucose: 105 BUN: 31

## 2024-08-01 ENCOUNTER — LABORATORY RESULT (OUTPATIENT)
Age: 84
End: 2024-08-01

## 2024-08-02 ENCOUNTER — APPOINTMENT (OUTPATIENT)
Dept: RHEUMATOLOGY | Facility: CLINIC | Age: 84
End: 2024-08-02
Payer: MEDICARE

## 2024-08-02 VITALS
HEIGHT: 65 IN | OXYGEN SATURATION: 93 % | DIASTOLIC BLOOD PRESSURE: 73 MMHG | SYSTOLIC BLOOD PRESSURE: 114 MMHG | WEIGHT: 155 LBS | BODY MASS INDEX: 25.83 KG/M2 | HEART RATE: 71 BPM

## 2024-08-02 PROBLEM — M87.051 IDIOPATHIC ASEPTIC NECROSIS OF RIGHT FEMUR: Chronic | Status: ACTIVE | Noted: 2024-07-24

## 2024-08-02 PROBLEM — S32.000A WEDGE COMPRESSION FRACTURE OF UNSPECIFIED LUMBAR VERTEBRA, INITIAL ENCOUNTER FOR CLOSED FRACTURE: Chronic | Status: ACTIVE | Noted: 2024-07-24

## 2024-08-02 PROBLEM — Z86.2 PERSONAL HISTORY OF DISEASES OF THE BLOOD AND BLOOD-FORMING ORGANS AND CERTAIN DISORDERS INVOLVING THE IMMUNE MECHANISM: Chronic | Status: ACTIVE | Noted: 2024-07-24

## 2024-08-02 PROBLEM — I95.9 HYPOTENSION, UNSPECIFIED: Chronic | Status: ACTIVE | Noted: 2024-07-24

## 2024-08-02 PROBLEM — Z95.828 PRESENCE OF OTHER VASCULAR IMPLANTS AND GRAFTS: Chronic | Status: ACTIVE | Noted: 2024-07-24

## 2024-08-02 PROBLEM — N18.9 CHRONIC KIDNEY DISEASE, UNSPECIFIED: Chronic | Status: ACTIVE | Noted: 2024-07-24

## 2024-08-02 PROCEDURE — 99214 OFFICE O/P EST MOD 30 MIN: CPT

## 2024-08-05 ENCOUNTER — TRANSCRIPTION ENCOUNTER (OUTPATIENT)
Age: 84
End: 2024-08-05

## 2024-08-05 NOTE — ASU PATIENT PROFILE, ADULT - NSICDXPASTSURGICALHX_GEN_ALL_CORE_FT
PAST SURGICAL HISTORY:  After cataract bilateral eye cataract surgically removed    Closed right hip fracture rigth hip ORIF july 19 2016    H/O kyphoplasty     S/P carpal tunnel release Right 2008 & 6/27/17    S/P foot surgery     S/P hysterectomy     S/P IVC filter nov 2016    S/P knee surgery

## 2024-08-05 NOTE — ASU PATIENT PROFILE, ADULT - FALL HARM RISK - UNIVERSAL INTERVENTIONS
Bed in lowest position, wheels locked, appropriate side rails in place/Call bell, personal items and telephone in reach/Instruct patient to call for assistance before getting out of bed or chair/Non-slip footwear when patient is out of bed/[place] to call system/Physically safe environment - no spills, clutter or unnecessary equipment/Purposeful Proactive Rounding/Room/bathroom lighting operational, light cord in reach

## 2024-08-05 NOTE — ASU PATIENT PROFILE, ADULT - ARRIVAL TIME
Initial Clinical Review    PLEASE NOTE: Patient  Admitted to Observation 3/5 @ 0680 [id number] and converted to Inpatient 3/5 @ 1553     Admission: Date/Time/Statement:   Admission Orders (From admission, onward)     Ordered        03/05/22 1553  Inpatient Admission  Once            03/05/22 0212  Place in Observation  Once                        Initial Presentation: 70 y o  female with a PMH of hypothyroidism and obesity  initially presented to ED @ [facility] on 3/4  with sharp RLQ abdominal pain, radiates to back, constant, severe that started this afternoon, associated with nause and nbnb vomiting  CT A&P showed 6 mm obstructing stone at the right ureteropelvic junction with associated mild hydronephrosis and obstructive uropathy  Case dw Urology who recommended transfer to B  REc'd Dilaudid IV x 3, Zofran IV X 1, flomax x 1 and IVF prior to transfer  Admitted to Observation with Ureteral stone with Hydronephrosis and Plan is for NPO, IVF, flomax, prn zofran, pain control, urology consult  Per Urology: H/O right flank pain associated with nausea and some vomiting  Imaging shows a proximal right ureteral stone at the ureteropelvic junction  She has had stone surgery previously, many years ago  She has also been able to pass stones on her own  She was recently hospitalized for COVID-19  Unfortunately we do not have the option of medical expulsive therapy given the size and location of her stone  Plan for OR     SURGERY DATE: 3/5/2022  Procedure(s) (LRB):  CYSTOSCOPY URETEROSCOPY WITH LITHOTRIPSY HOLMIUM LASER, RETROGRADE PYELOGRAM AND INSERTION STENT URETERAL (Right)   Anesthesia Type:   General   Operative Findings:  A ureteropelvic junction stone is noted on the right  This was engaged with dusting settings using a virtual basketing technique to prevent retropulsion  Stones were broken into dust   No fragments remained that were large enough for basketing    [facility]30 centimeter stent was placed  Two stents had to be opened as the string on the for stent broke  The 2nd stent was successfully placed with a Dangler being taped to the mons pubis  The patient can remove her stent in 5 days  She can see us back in the office in 3 months with an x-ray and ultrasound prior      Admission   Vitals   Temperature Pulse Respirations Blood Pressure SpO2   03/05/22 0150 03/05/22 0150 03/05/22 0150 03/05/22 0150 03/05/22 0150   97 6 °F (36 4 °C) 91 17 154/82 91 %      Temp Source Heart Rate Source Patient Position - Orthostatic VS BP Location FiO2 (%)   03/05/22 1025 03/05/22 1025 -- -- --   Temporal Monitor         Pain Score       03/05/22 0155       No Pain          Wt Readings from Last 1 Encounters:   03/05/22 86 9 kg (191 lb 9 3 oz)     Additional Vital Signs:   03/05/22 14:55:19 97 3 °F (36 3 °C)  81 16 133/60 84 95 % -- --   03/05/22 1045 97 5 °F (36 4 °C) 80 13 138/77 96 96 % None (Room air) WDL   03/05/22 1030 -- 84 17 121/65 94 97 % None (Room air) --   03/05/22 1025 96 9 °F (36 1 °C)  87 16 168/68 -- 100 % Simple mask WDL   03/05/22 0900 -- -- -- -- -- -- None (Room air) --   03/05/22 07:35:04 97 3 °F (36 3 °C)  70 20 149/79 102 98 % --        Pertinent Labs/Diagnostic Test Results:   FL retrograde pyelogram   Final Result by Shruthi Brown MD (03/05 1335)      Fluoroscopic guidance provided for procedure guidance  Please refer to the separate procedure notes for additional details                 Workstation performed: [id number]           3/4 CT A&P in ED: 6 mm obstructing stone at the right ureteropelvic junction with associated mild hydronephrosis and obstructive uropathy       Patchy opacities in the visualized lungs bilaterally, could represent infectious or inflammatory pneumonitis or scarring, correlates with the patient's history of known Covid 19 infection        Coronary atherosclerosis, fatty infiltration of the liver, bilateral renal cysts, colonic diverticulosis without evidence of acute diverticulitis, small hiatal hernia, and other findings as above    Results from last 7 days   Lab Units 03/06/22 0446 03/04/22 1949   WBC Thousand/uL 7 36 10 33*   HEMOGLOBIN g/dL 11 4* 14 3   HEMATOCRIT % 34 6* 41 5   PLATELETS Thousands/uL 180 223   NEUTROS ABS Thousands/µL 4 50 7 81*     Results from last 7 days   Lab Units 03/06/22 0446 03/04/22 1949   SODIUM mmol/L 137 138   POTASSIUM mmol/L 3 5 3 5   CHLORIDE mmol/L 107 101   CO2 mmol/L 25 22   ANION GAP mmol/L 5 15*   BUN mg/dL 11 12   CREATININE mg/dL 0 83 1 09   EGFR ml/min/1 73sq m 70 50   CALCIUM mg/dL 9 1 9 2     Results from last 7 days   Lab Units 03/04/22 1949   AST U/L 21   ALT U/L 29   ALK PHOS U/L 89   TOTAL PROTEIN g/dL 7 7   ALBUMIN g/dL 3 3*   TOTAL BILIRUBIN mg/dL 0 44     Results from last 7 days   Lab Units 03/06/22 0446 03/04/22 1949   GLUCOSE RANDOM mg/dL 104 161*     Results from last 7 days   Lab Units 03/04/22 1949   HEMOGLOBIN A1C % 6 0*   EAG mg/dl 126     Results from last 7 days   Lab Units 03/04/22 1949   LIPASE u/L 116     Results from last 7 days   Lab Units 03/05/22  0024   CLARITY UA  Slightly Cloudy   COLOR UA  Yellow   SPEC GRAV UA  1 010   PH UA  5 0   GLUCOSE UA mg/dl Negative   KETONES UA mg/dl Negative   BLOOD UA  Large*   PROTEIN UA mg/dl Negative   NITRITE UA  Negative   BILIRUBIN UA  Negative   UROBILINOGEN UA E U /dl 0 2   LEUKOCYTES UA  Negative   WBC UA /hpf None Seen   RBC UA /hpf 20-30*   BACTERIA UA /hpf Occasional   EPITHELIAL CELLS WET PREP /hpf Occasional       Past Medical History:   Diagnosis Date    Arthritis     Cataracts, bilateral     Disease of thyroid gland     Hypothyroid     Obesity (BMI 35 0-39 9 without comorbidity)     PNA (pneumonia) 05/2019     Present on Admission:   Ureteral stone with hydronephrosis   Hypothyroidism due to Hashimoto's thyroiditis   Morbid obesity ([facility] [address] )      Admitting Diagnosis: Right ureteral stone [N20 1]  Age/Sex: 70 y o  female  Admission Orders:  Scheduled Medications:  benzonatate, 200 mg, Oral, TID  levothyroxine, 50 mcg, Oral, Early Morning  oxybutynin, 5 mg, Oral, BID  phenazopyridine, 100 mg, Oral, TID With Meals  tamsulosin, 0 4 mg, Oral, Daily With Dinner  KCL 40 meq IV x 1 3/5    Continuous IV Infusions:  sodium chloride, 100 mL/hr, Intravenous, Continuous    PRN Meds:  acetaminophen, 650 mg, Oral, Q6H PRN  HYDROmorphone, 0 5 mg, Intravenous, Q6H PRN x 1 3/5   Or  HYDROmorphone, 1 mg, Intravenous, Q6H PRN  ondansetron, 4 mg, Intravenous, Q6H PRN x 1 35  polyethylene glycol, 17 g, Oral, Daily PRN    SCDs  IP CONSULT TO UROLOGY    Network Utilization Review Department  ATTENTION: Please call with any questions or concerns to [phone] and carefully listen to the prompts so that you are directed to the right person  All voicemails are confidential   [place] Doing all requests for admission clinical reviews, approved or denied determinations and any other requests to dedicated fax number below belonging to the campus where the patient is receiving treatment   List of dedicated fax numbers for the Facilities:  [address] DENIALS (Administrative/Medical Necessity) [phone]   [address] (Maternity/NICU/Pediatrics) [phone]   [address]  [address] [address] [place] [address] [address] [facility] [place] [address] [phone]

## 2024-08-05 NOTE — ASU PATIENT PROFILE, ADULT - NSICDXPASTMEDICALHX_GEN_ALL_CORE_FT
PAST MEDICAL HISTORY:  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     Chronic kidney disease (CKD)     COPD (chronic obstructive pulmonary disease)     DM (diabetes mellitus)     H/O aplastic anemia     H/O osteoporosis     Hiatal hernia     History of basal cell cancer     History of IBS     History of immunodeficiency     HLD (hyperlipidemia)     Hypotension     Lumbar compression fracture     PAF (paroxysmal atrial fibrillation) s/p ablation    PMR (polymyalgia rheumatica)     Presence of IVC filter

## 2024-08-06 ENCOUNTER — OUTPATIENT (OUTPATIENT)
Dept: OUTPATIENT SERVICES | Facility: HOSPITAL | Age: 84
LOS: 1 days | End: 2024-08-06
Payer: MEDICARE

## 2024-08-06 ENCOUNTER — TRANSCRIPTION ENCOUNTER (OUTPATIENT)
Age: 84
End: 2024-08-06

## 2024-08-06 VITALS
RESPIRATION RATE: 14 BRPM | HEART RATE: 71 BPM | SYSTOLIC BLOOD PRESSURE: 120 MMHG | DIASTOLIC BLOOD PRESSURE: 69 MMHG | TEMPERATURE: 98 F | OXYGEN SATURATION: 94 %

## 2024-08-06 VITALS
DIASTOLIC BLOOD PRESSURE: 65 MMHG | RESPIRATION RATE: 17 BRPM | SYSTOLIC BLOOD PRESSURE: 151 MMHG | OXYGEN SATURATION: 95 % | TEMPERATURE: 98 F | HEART RATE: 78 BPM

## 2024-08-06 DIAGNOSIS — S72.001A FRACTURE OF UNSPECIFIED PART OF NECK OF RIGHT FEMUR, INITIAL ENCOUNTER FOR CLOSED FRACTURE: Chronic | ICD-10-CM

## 2024-08-06 DIAGNOSIS — Z98.890 OTHER SPECIFIED POSTPROCEDURAL STATES: Chronic | ICD-10-CM

## 2024-08-06 DIAGNOSIS — Z98.89 OTHER SPECIFIED POSTPROCEDURAL STATES: Chronic | ICD-10-CM

## 2024-08-06 DIAGNOSIS — R29.898 OTHER SYMPTOMS AND SIGNS INVOLVING THE MUSCULOSKELETAL SYSTEM: ICD-10-CM

## 2024-08-06 DIAGNOSIS — Z90.710 ACQUIRED ABSENCE OF BOTH CERVIX AND UTERUS: Chronic | ICD-10-CM

## 2024-08-06 DIAGNOSIS — H26.40 UNSPECIFIED SECONDARY CATARACT: Chronic | ICD-10-CM

## 2024-08-06 DIAGNOSIS — Z95.828 PRESENCE OF OTHER VASCULAR IMPLANTS AND GRAFTS: Chronic | ICD-10-CM

## 2024-08-06 PROCEDURE — 88305 TISSUE EXAM BY PATHOLOGIST: CPT

## 2024-08-06 PROCEDURE — 88305 TISSUE EXAM BY PATHOLOGIST: CPT | Mod: 26

## 2024-08-06 PROCEDURE — 25075 EXC FOREARM LES SC < 3 CM: CPT | Mod: LT

## 2024-08-06 RX ORDER — CEFAZOLIN SODIUM 10 G
2000 VIAL (EA) INJECTION ONCE
Refills: 0 | Status: COMPLETED | OUTPATIENT
Start: 2024-08-06 | End: 2024-08-06

## 2024-08-06 RX ORDER — DEXTROSE MONOHYDRATE, SODIUM CHLORIDE, SODIUM LACTATE, CALCIUM CHLORIDE, MAGNESIUM CHLORIDE 1.5; 538; 448; 18.4; 5.08 G/100ML; MG/100ML; MG/100ML; MG/100ML; MG/100ML
1000 SOLUTION INTRAPERITONEAL
Refills: 0 | Status: DISCONTINUED | OUTPATIENT
Start: 2024-08-06 | End: 2024-08-06

## 2024-08-06 RX ORDER — HYDROMORPHONE HCL IN 0.9% NACL 0.2 MG/ML
0.5 PLASTIC BAG, INJECTION (ML) INTRAVENOUS
Refills: 0 | Status: DISCONTINUED | OUTPATIENT
Start: 2024-08-06 | End: 2024-08-06

## 2024-08-06 RX ORDER — OXYCODONE HYDROCHLORIDE 30 MG/1
5 TABLET ORAL ONCE
Refills: 0 | Status: DISCONTINUED | OUTPATIENT
Start: 2024-08-06 | End: 2024-08-06

## 2024-08-06 RX ORDER — ONDANSETRON HCL/PF 4 MG/2 ML
4 VIAL (ML) INJECTION ONCE
Refills: 0 | Status: DISCONTINUED | OUTPATIENT
Start: 2024-08-06 | End: 2024-08-06

## 2024-08-06 RX ADMIN — DEXTROSE MONOHYDRATE, SODIUM CHLORIDE, SODIUM LACTATE, CALCIUM CHLORIDE, MAGNESIUM CHLORIDE 75 MILLILITER(S): 1.5; 538; 448; 18.4; 5.08 SOLUTION INTRAPERITONEAL at 08:00

## 2024-08-06 RX ADMIN — DEXTROSE MONOHYDRATE, SODIUM CHLORIDE, SODIUM LACTATE, CALCIUM CHLORIDE, MAGNESIUM CHLORIDE 75 MILLILITER(S): 1.5; 538; 448; 18.4; 5.08 SOLUTION INTRAPERITONEAL at 09:29

## 2024-08-06 NOTE — ASU DISCHARGE PLAN (ADULT/PEDIATRIC) - NS MD DC FALL RISK RISK
For information on Fall & Injury Prevention, visit: [email or web address] OR  [email or web address] OR  https://www.cdc.gov/steadi/patient.html

## 2024-08-06 NOTE — ASU DISCHARGE PLAN (ADULT/PEDIATRIC) - ASU DC SPECIAL INSTRUCTIONSFT
Follow up with Dr Wilson in 13 days on 8/19/24  Keep dressing on, clean and dry at all times. Change dressing in 6 days and replace with bandaid  No heavy lifting. No creams, ointments or lotions on your incision  Medication has been sent to the pharmacy as follows:       - Follow up with Dr Wilson in 13 days on 8/19/24  Keep dressing on, clean and dry at all times. Change dressing in 6 days and replace with bandaid  No heavy lifting. No creams, ointments or lotions on your incision  Medication has been sent to the pharmacy as follows:       - Hydrocodone 5-325mg take 1-2 tabs every 4-6 hours as needed for pain

## 2024-08-06 NOTE — ASU PREOP CHECKLIST - SURGICAL CONSENT
Scheduled date of colonoscopy (as of today):2/22/23  Physician performing colonoscopy:DR DAVID  Location of colonoscopy:[facility]  Clearances: NA    VACCINATED-NOT DIABETIC & PT HAS NOT TESTED POSITIVE FOR COVID IN THE LAST 30 DAYS 
done

## 2024-08-06 NOTE — ASU DISCHARGE PLAN (ADULT/PEDIATRIC) - CARE PROVIDER_API CALL
Emil Wilson.  Orthopaedic Surgery  [address] [place] [address]  Phone: [phone]  Fax: [phone]  Follow Up Time:

## 2024-08-13 LAB — SURGICAL PATHOLOGY STUDY: SIGNIFICANT CHANGE UP

## 2024-08-22 NOTE — ED PROVIDER NOTE - SEVERITY
Pt arrived to ED with c/o urinary retention. Pt reports he was at Dr. Marshall office yesterday which him and his medical staff placed a ogden catheter with urinary bag to go home with. Pt reported that he came home and fell asleep shortly after his appointment at 1700. He reports he woke up at midnight with a full bag that he emptied. When he woke up this morning he had an empty bag with the urgency to urinate. Pt was educated to head to the ER if he was having difficulties with his urination. Patient had noticeable discharge in his depends and in his groin area.     Dr. Anne at bedside with writer for bladder scan which read 523mL.     Patient provided warm blankets with call light in reach.    PAIN SCALE 5 OF 10.

## 2024-08-28 ENCOUNTER — APPOINTMENT (OUTPATIENT)
Dept: CARDIOLOGY | Facility: CLINIC | Age: 84
End: 2024-08-28
Payer: MEDICARE

## 2024-08-28 VITALS
SYSTOLIC BLOOD PRESSURE: 113 MMHG | HEART RATE: 63 BPM | HEIGHT: 65 IN | OXYGEN SATURATION: 93 % | DIASTOLIC BLOOD PRESSURE: 71 MMHG

## 2024-08-28 DIAGNOSIS — I10 ESSENTIAL (PRIMARY) HYPERTENSION: ICD-10-CM

## 2024-08-28 DIAGNOSIS — I48.0 PAROXYSMAL ATRIAL FIBRILLATION: ICD-10-CM

## 2024-08-28 DIAGNOSIS — R00.2 PALPITATIONS: ICD-10-CM

## 2024-08-28 PROCEDURE — G2211 COMPLEX E/M VISIT ADD ON: CPT

## 2024-08-28 PROCEDURE — 99214 OFFICE O/P EST MOD 30 MIN: CPT

## 2024-08-28 PROCEDURE — 93000 ELECTROCARDIOGRAM COMPLETE: CPT

## 2024-08-28 NOTE — REVIEW OF SYSTEMS
[Feeling Fatigued] : not feeling fatigued [Dyspnea on exertion] : dyspnea during exertion [Lower Ext Edema] : lower extremity edema [Cough] : no cough [Joint Pain] : joint pain [Joint Stiffness] : joint stiffness [Dizziness] : dizziness [Negative] : Heme/Lymph [FreeTextEntry2] : as per HPI [de-identified] : as per HPI- unchanged

## 2024-08-28 NOTE — PHYSICAL EXAM
[Well Nourished] : well nourished [Normal Rate] : normal [Rhythm Regular] : regular [Normal S1] : normal S1 [Normal S2] : normal S2 [II] : a grade 2 [___ +] : bilateral [unfilled]U+ pitting edema to the ankles [1+] : left 1+ [Right Carotid Bruit] : no bruit heard over the right carotid [Left Carotid Bruit] : no bruit heard over the left carotid [Clear Lung Fields] : clear lung fields [No Respiratory Distress] : no respiratory distress  [Wheeze ____] : wheeze [unfilled] [Normal] : alert and oriented, normal memory [de-identified] : in wheelchair [de-identified] : in wheelchair

## 2024-08-28 NOTE — DISCUSSION/SUMMARY
[FreeTextEntry1] : 83 year old woman with a history as listed above presents for a followup visit.  Dee has been doing well.  She denies any anginal symptoms. Clinically she is compensated from a HF POV.  She will continue Torsemide 10mg  Qday.  Her orthostasis better now that her Hb has stabilized >9. She is unable to use compression stockings.. She will continue Midodrine   10mg Am, and 5 mg PM . I will tolerate a small level of permissive HTN.    She had a Ziopatch in 2/2021 that showed symptomatic PACs and PAT episodes. There was no AF seen. She did not have any signs of heart block or symptomatic bradycardia during the monitoring period.  Cardizem was stopped. She will continue  Amiodarone 100mg Qday Her last PFTs in 2/2023 showed severe reduction in diffusion capacity. This may be from Amio. Will need updated PFTs with Dr Gallegos.   She will continue Eliquis 2,5mg Q12.  She will see heme for her aplastic anemia. She gets IVIG. She will fu with heme.  She is not a watchman candidate.  Her lipid profile is at goal.  Exercise and diet counseling was performed in order to reduce her future cardiovascular risk.  I asked she return for follow up in 3 months.      [EKG obtained to assist in diagnosis and management of assessed problem(s)] : EKG obtained to assist in diagnosis and management of assessed problem(s)

## 2024-08-28 NOTE — HISTORY OF PRESENT ILLNESS
[FreeTextEntry1] : 83 year old female with PMhx of AFib (on AC), DVT.  CKD, COPD, Anemia, DM, HTN, Hiatal Hernia, IBS, PAD, TMJ, Sinus Ermias, PMR on Prednisone /   Since her last visit,  she is s/p left arm surgery for removal of a soft tissue. Her Hb is stabilized at 10.6 today. She is receiving Procrit.   No reported melena, hematochezia or hematemesis.   Her lightheadness has resolved with improved with Hb. Her lower extremity edema has resolved.  She   denies any dyspnea,  chest pain, PND, orthopnea, lower extremity edema,  strokelike symptoms. She claims to hydrate well.   She is on leflunomide, steroids (for 1 month) and getting IVIG once  a month. Medication reconciliation performed. She is compliant with her medications.

## 2024-09-13 ENCOUNTER — APPOINTMENT (OUTPATIENT)
Dept: INTERNAL MEDICINE | Facility: CLINIC | Age: 84
End: 2024-09-13
Payer: MEDICARE

## 2024-09-13 PROCEDURE — G0008: CPT

## 2024-09-13 PROCEDURE — 90662 IIV NO PRSV INCREASED AG IM: CPT

## 2024-09-13 NOTE — ED ADULT NURSE NOTE - NSIMPLEMENTINTERV_GEN_ALL_ED
Inf pt orders signed   Implemented All Fall Risk Interventions:  [place] to call system. Call bell, personal items and telephone within reach. Instruct patient to call for assistance. Room bathroom lighting operational. Non-slip footwear when patient is off stretcher. Physically safe environment: no spills, clutter or unnecessary equipment. Stretcher in lowest position, wheels locked, appropriate side rails in place. Provide visual cue, wrist band, yellow gown, etc. Monitor gait and stability. Monitor for mental status changes and reorient to person, place, and time. Review medications for side effects contributing to fall risk. Reinforce activity limits and safety measures with patient and family.

## 2024-09-27 ENCOUNTER — APPOINTMENT (OUTPATIENT)
Dept: RHEUMATOLOGY | Facility: CLINIC | Age: 84
End: 2024-09-27
Payer: MEDICARE

## 2024-09-27 PROCEDURE — 99214 OFFICE O/P EST MOD 30 MIN: CPT

## 2024-10-10 ENCOUNTER — OUTPATIENT (OUTPATIENT)
Dept: OUTPATIENT SERVICES | Facility: HOSPITAL | Age: 84
LOS: 1 days | End: 2024-10-10
Payer: MEDICARE

## 2024-10-10 VITALS
TEMPERATURE: 98 F | RESPIRATION RATE: 17 BRPM | SYSTOLIC BLOOD PRESSURE: 139 MMHG | DIASTOLIC BLOOD PRESSURE: 75 MMHG | HEART RATE: 72 BPM | OXYGEN SATURATION: 89 %

## 2024-10-10 VITALS
TEMPERATURE: 98 F | OXYGEN SATURATION: 93 % | DIASTOLIC BLOOD PRESSURE: 80 MMHG | SYSTOLIC BLOOD PRESSURE: 144 MMHG | RESPIRATION RATE: 17 BRPM | HEART RATE: 62 BPM

## 2024-10-10 DIAGNOSIS — H26.40 UNSPECIFIED SECONDARY CATARACT: Chronic | ICD-10-CM

## 2024-10-10 DIAGNOSIS — Z95.828 PRESENCE OF OTHER VASCULAR IMPLANTS AND GRAFTS: Chronic | ICD-10-CM

## 2024-10-10 DIAGNOSIS — D63.1 ANEMIA IN CHRONIC KIDNEY DISEASE: ICD-10-CM

## 2024-10-10 DIAGNOSIS — Z98.89 OTHER SPECIFIED POSTPROCEDURAL STATES: Chronic | ICD-10-CM

## 2024-10-10 DIAGNOSIS — Z98.890 OTHER SPECIFIED POSTPROCEDURAL STATES: Chronic | ICD-10-CM

## 2024-10-10 DIAGNOSIS — S72.001A FRACTURE OF UNSPECIFIED PART OF NECK OF RIGHT FEMUR, INITIAL ENCOUNTER FOR CLOSED FRACTURE: Chronic | ICD-10-CM

## 2024-10-10 DIAGNOSIS — Z90.710 ACQUIRED ABSENCE OF BOTH CERVIX AND UTERUS: Chronic | ICD-10-CM

## 2024-10-10 LAB
BLD GP AB SCN SERPL QL: SIGNIFICANT CHANGE UP
HCT VFR BLD CALC: 25.6 % — LOW (ref 34.5–45)
HGB BLD-MCNC: 7.8 G/DL — LOW (ref 11.5–15.5)
MCHC RBC-ENTMCNC: 30.5 GM/DL — LOW (ref 32–36)
MCHC RBC-ENTMCNC: 32.2 PG — SIGNIFICANT CHANGE UP (ref 27–34)
MCV RBC AUTO: 105.8 FL — HIGH (ref 80–100)
NRBC # BLD: 8 /100 WBCS — HIGH (ref 0–0)
PLATELET # BLD AUTO: 149 K/UL — LOW (ref 150–400)
RBC # BLD: 2.42 M/UL — LOW (ref 3.8–5.2)
RBC # FLD: 23.3 % — HIGH (ref 10.3–14.5)
WBC # BLD: 2.49 K/UL — LOW (ref 3.8–10.5)
WBC # FLD AUTO: 2.49 K/UL — LOW (ref 3.8–10.5)

## 2024-10-10 RX ORDER — DIPHENHYDRAMINE HCL 12.5MG/5ML
25 LIQUID (ML) ORAL ONCE
Refills: 0 | Status: COMPLETED | OUTPATIENT
Start: 2024-10-10 | End: 2024-10-10

## 2024-10-10 RX ORDER — FUROSEMIDE 10 MG/ML
40 INJECTION INTRAVENOUS ONCE
Refills: 0 | Status: COMPLETED | OUTPATIENT
Start: 2024-10-10 | End: 2024-10-10

## 2024-10-10 RX ORDER — ACETAMINOPHEN 325 MG
650 TABLET ORAL ONCE
Refills: 0 | Status: COMPLETED | OUTPATIENT
Start: 2024-10-10 | End: 2024-10-10

## 2024-10-10 RX ORDER — HEPARIN SOD,PORCINE/0.9 % NACL 10 UNIT/ML
100 KIT INTRAVENOUS ONCE
Refills: 0 | Status: ACTIVE | OUTPATIENT
Start: 2024-10-10

## 2024-10-10 RX ADMIN — Medication 650 MILLIGRAM(S): at 13:12

## 2024-10-10 RX ADMIN — Medication 25 MILLIGRAM(S): at 13:13

## 2024-10-10 RX ADMIN — FUROSEMIDE 40 MILLIGRAM(S): 10 INJECTION INTRAVENOUS at 16:46

## 2024-10-11 ENCOUNTER — RX RENEWAL (OUTPATIENT)
Age: 84
End: 2024-10-11

## 2024-10-30 NOTE — H&P PST ADULT - PRO ARRIVE FROM
"[facility] Dermatology Clinic Note     Patient Name: Thomas Angel  Encounter Date: 10/30/24     Have you been cared for by a [facility] Dermatologist in the last 3 years and, if so, which description applies to you?    YES.  I HAVE been seen here within the last 3 years, and my medical history HAS changed.   I am FEMALE/of child-bearing potential.    REVIEW OF SYSTEMS:  Have you recently had or currently have any of the following? Recent fever or chills? No  Any non-healing wound? No  Are you pregnant or planning to become pregnant? Evelina Lambert MD  Dermatology, PGY-2  " home

## 2024-11-13 ENCOUNTER — APPOINTMENT (OUTPATIENT)
Dept: INTERNAL MEDICINE | Facility: CLINIC | Age: 84
End: 2024-11-13
Payer: MEDICARE

## 2024-11-13 VITALS
WEIGHT: 155 LBS | HEIGHT: 65 IN | DIASTOLIC BLOOD PRESSURE: 74 MMHG | SYSTOLIC BLOOD PRESSURE: 112 MMHG | HEART RATE: 63 BPM | RESPIRATION RATE: 16 BRPM | OXYGEN SATURATION: 93 % | BODY MASS INDEX: 25.83 KG/M2

## 2024-11-13 DIAGNOSIS — D64.9 ANEMIA, UNSPECIFIED: ICD-10-CM

## 2024-11-13 DIAGNOSIS — H02.109 UNSPECIFIED ECTROPION OF UNSPECIFIED EYE, UNSPECIFIED EYELID: ICD-10-CM

## 2024-11-13 DIAGNOSIS — I48.0 PAROXYSMAL ATRIAL FIBRILLATION: ICD-10-CM

## 2024-11-13 DIAGNOSIS — Z01.818 ENCOUNTER FOR OTHER PREPROCEDURAL EXAMINATION: ICD-10-CM

## 2024-11-13 PROCEDURE — 99214 OFFICE O/P EST MOD 30 MIN: CPT

## 2024-11-15 ENCOUNTER — NON-APPOINTMENT (OUTPATIENT)
Age: 84
End: 2024-11-15

## 2024-11-15 ENCOUNTER — LABORATORY RESULT (OUTPATIENT)
Age: 84
End: 2024-11-15

## 2024-11-16 LAB
BASOPHILS # BLD AUTO: 0.03 K/UL
BASOPHILS NFR BLD AUTO: 0.9 %
EOSINOPHIL # BLD AUTO: 0 K/UL
EOSINOPHIL NFR BLD AUTO: 0 %
HCT VFR BLD CALC: 29.7 %
HGB BLD-MCNC: 9 G/DL
LYMPHOCYTES # BLD AUTO: 1.1 K/UL
LYMPHOCYTES NFR BLD AUTO: 33.3 %
MAN DIFF?: NORMAL
MCHC RBC-ENTMCNC: 30.3 G/DL
MCHC RBC-ENTMCNC: 30.4 PG
MCV RBC AUTO: 100.3 FL
MONOCYTES # BLD AUTO: 0.46 K/UL
MONOCYTES NFR BLD AUTO: 14 %
NEUTROPHILS # BLD AUTO: 1.57 K/UL
NEUTROPHILS NFR BLD AUTO: 41.2 %
PLATELET # BLD AUTO: 150 K/UL
RBC # BLD: 2.96 M/UL
RBC # FLD: 27.1 %
WBC # FLD AUTO: 3.31 K/UL

## 2024-11-17 NOTE — ED PROVIDER NOTE - CONSTITUTIONAL, MLM
Sonny Bailey is a 76 year old male presenting with sinus drainage and \"runny eyes\" for 10 days. Patient states he sat next to a coughing patient on an airplane. Has tried OTC Afrin and Sudafed q4 hours. Also complains of hip pain for one month.  Medication and allergy lists verified and updated with pt.  Pharmacy verified with patient.  PCP: Umesh Olsen MD    Patient would like communication of their results via:    [facility]    Patient informed clinic will advise of positive results only.     normal... Well appearing, well nourished, awake, alert, oriented to person, place, time/situation and in no apparent distress.

## 2024-11-18 ENCOUNTER — OUTPATIENT (OUTPATIENT)
Dept: OUTPATIENT SERVICES | Facility: HOSPITAL | Age: 84
LOS: 1 days | End: 2024-11-18
Payer: MEDICARE

## 2024-11-18 VITALS
OXYGEN SATURATION: 96 % | RESPIRATION RATE: 20 BRPM | DIASTOLIC BLOOD PRESSURE: 72 MMHG | SYSTOLIC BLOOD PRESSURE: 110 MMHG | HEART RATE: 76 BPM

## 2024-11-18 VITALS
HEIGHT: 63 IN | DIASTOLIC BLOOD PRESSURE: 58 MMHG | OXYGEN SATURATION: 96 % | TEMPERATURE: 98 F | RESPIRATION RATE: 20 BRPM | HEART RATE: 70 BPM | SYSTOLIC BLOOD PRESSURE: 118 MMHG | WEIGHT: 153 LBS

## 2024-11-18 DIAGNOSIS — Z98.89 OTHER SPECIFIED POSTPROCEDURAL STATES: Chronic | ICD-10-CM

## 2024-11-18 DIAGNOSIS — Z98.890 OTHER SPECIFIED POSTPROCEDURAL STATES: Chronic | ICD-10-CM

## 2024-11-18 DIAGNOSIS — H04.561 STENOSIS OF RIGHT LACRIMAL PUNCTUM: ICD-10-CM

## 2024-11-18 DIAGNOSIS — H04.562 STENOSIS OF LEFT LACRIMAL PUNCTUM: ICD-10-CM

## 2024-11-18 DIAGNOSIS — Z95.828 PRESENCE OF OTHER VASCULAR IMPLANTS AND GRAFTS: Chronic | ICD-10-CM

## 2024-11-18 DIAGNOSIS — H26.40 UNSPECIFIED SECONDARY CATARACT: Chronic | ICD-10-CM

## 2024-11-18 DIAGNOSIS — H02.102 UNSPECIFIED ECTROPION OF RIGHT LOWER EYELID: ICD-10-CM

## 2024-11-18 DIAGNOSIS — Z90.710 ACQUIRED ABSENCE OF BOTH CERVIX AND UTERUS: Chronic | ICD-10-CM

## 2024-11-18 DIAGNOSIS — S72.001A FRACTURE OF UNSPECIFIED PART OF NECK OF RIGHT FEMUR, INITIAL ENCOUNTER FOR CLOSED FRACTURE: Chronic | ICD-10-CM

## 2024-11-18 LAB — GLUCOSE BLDC GLUCOMTR-MCNC: 101 MG/DL — HIGH (ref 70–99)

## 2024-11-18 PROCEDURE — 82962 GLUCOSE BLOOD TEST: CPT

## 2024-11-18 PROCEDURE — ZZZZZ: CPT

## 2024-11-18 PROCEDURE — 68815 PROBE NASOLACRIMAL DUCT: CPT | Mod: 50

## 2024-11-18 PROCEDURE — C1889: CPT

## 2024-11-18 DEVICE — IMPLANTABLE DEVICE: Type: IMPLANTABLE DEVICE | Site: BILATERAL EYES | Status: FUNCTIONAL

## 2024-11-18 RX ORDER — TINIDAZOLE 500 MG/1
2 TABLET, FILM COATED ORAL
Refills: 0 | DISCHARGE

## 2024-11-18 RX ORDER — TIZANIDINE 4 MG/1
2 TABLET ORAL
Refills: 0 | DISCHARGE

## 2024-11-18 NOTE — ASU DISCHARGE PLAN (ADULT/PEDIATRIC) - ASU DC SPECIAL INSTRUCTIONSFT
Continue Home Medication Regimen as per your Primary Care Provider    Take patches off in 24 hours    Apply Maxitrol (Neomycin /PolymyxinB /Dexamethasone) ointment to surgical sites 4 times a day    Apply cold compress to both eye 20 minutes on 20 minutes off for next 24 hours    Follow up in Dr. Fields's office on Friday

## 2024-11-18 NOTE — CHART NOTE - NSCHARTNOTEFT_GEN_A_CORE
Called to see patient regarding a skin tear.  Patient sustained an injury on her right hand.  Superficial skin tear about 4.5cm x 2.5cm.  Skin flap still present.  Has superficial bleeding.  Washed with saline.  Placed non-adherent gauze with bacitracin and wrapped with cling tape.  Instructed to monitor for any signs for an infection.  Has a hematologist on Wednesday.  Can follow up then.

## 2024-11-18 NOTE — BRIEF OPERATIVE NOTE - NSICDXBRIEFPROCEDURE_GEN_ALL_CORE_FT
PROCEDURES:  Lateral tarsal strip procedure of both eyes 18-Nov-2024 10:18:45  Elsa Fields  Three snip punctoplasty of both eyes 18-Nov-2024 10:19:28  Elsa Fields  Insertion of lacrimal tube 18-Nov-2024 10:20:07  Elsa Fields

## 2024-11-18 NOTE — ASU PATIENT PROFILE, ADULT - FALL HARM RISK - HARM RISK INTERVENTIONS
Assistance with ambulation/Assistance OOB with selected safe patient handling equipment/Communicate Risk of Fall with Harm to all staff/Discuss with provider need for PT consult/Monitor gait and stability/Provide patient with walking aids - walker, cane, crutches/Reinforce activity limits and safety measures with patient and family/Review medications for side effects contributing to fall risk/Tailored Fall Risk Interventions/Visual Cue: Yellow wristband and red socks/Bed in lowest position, wheels locked, appropriate side rails in place/Call bell, personal items and telephone in reach/Instruct patient to call for assistance before getting out of bed or chair/Non-slip footwear when patient is out of bed/[place] to call system/Physically safe environment - no spills, clutter or unnecessary equipment/Purposeful Proactive Rounding/Room/bathroom lighting operational, light cord in reach

## 2024-11-18 NOTE — ASU PATIENT PROFILE, ADULT - PATIENT'S HEIGHT AND WEIGHT RECORDED IN THE VITAL SIGNS FLOWSHEET
Placed in folder this AM, recommended nurse visit for EKG, will addend the note from the first visit. Have blood work already.     Trini Tanner MD   yes

## 2024-11-18 NOTE — ASU DISCHARGE PLAN (ADULT/PEDIATRIC) - FINANCIAL ASSISTANCE
[facility] provides services at a reduced cost to those who are determined to be eligible through [facility]’s financial assistance program. Information regarding [facility]’s financial assistance program can be found by going to [email or web address] or by calling [phone].

## 2024-11-18 NOTE — ASU PATIENT PROFILE, ADULT - NSICDXPASTMEDICALHX_GEN_ALL_CORE_FT
PAST MEDICAL HISTORY:  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     Chronic kidney disease (CKD)     COPD (chronic obstructive pulmonary disease)     DM (diabetes mellitus)     H/O aplastic anemia     H/O hiatal hernia     H/O osteoporosis     H/O pulmonary fibrosis     H/O sinus bradycardia     Hiatal hernia     History of basal cell cancer     History of IBS     History of immunodeficiency     HLD (hyperlipidemia)     Hypotension     Lumbar compression fracture     PAF (paroxysmal atrial fibrillation) s/p ablation    PMR (polymyalgia rheumatica)     Presence of IVC filter

## 2024-11-18 NOTE — ASU DISCHARGE PLAN (ADULT/PEDIATRIC) - CARE PROVIDER_API CALL
Elsa Fields  Ophthalmology  [address] [place] [address], [address]  [place] [address]  Phone: [phone]  Fax: [phone]  Scheduled Appointment: 11/22/2024 09:30 AM

## 2024-11-19 ENCOUNTER — APPOINTMENT (OUTPATIENT)
Dept: RHEUMATOLOGY | Facility: CLINIC | Age: 84
End: 2024-11-19

## 2024-11-20 PROCEDURE — 36430 TRANSFUSION BLD/BLD COMPNT: CPT

## 2024-11-20 PROCEDURE — 86850 RBC ANTIBODY SCREEN: CPT

## 2024-11-20 PROCEDURE — 86900 BLOOD TYPING SEROLOGIC ABO: CPT

## 2024-11-20 PROCEDURE — 36415 COLL VENOUS BLD VENIPUNCTURE: CPT

## 2024-11-20 PROCEDURE — P9040: CPT

## 2024-11-20 PROCEDURE — 85027 COMPLETE CBC AUTOMATED: CPT

## 2024-11-20 PROCEDURE — 86923 COMPATIBILITY TEST ELECTRIC: CPT

## 2024-11-20 PROCEDURE — 86901 BLOOD TYPING SEROLOGIC RH(D): CPT

## 2024-11-21 PROBLEM — Z87.19 PERSONAL HISTORY OF OTHER DISEASES OF THE DIGESTIVE SYSTEM: Chronic | Status: ACTIVE | Noted: 2024-11-18

## 2024-11-21 PROBLEM — Z86.79 PERSONAL HISTORY OF OTHER DISEASES OF THE CIRCULATORY SYSTEM: Chronic | Status: ACTIVE | Noted: 2024-11-18

## 2024-11-22 RX ORDER — HEPARIN SODIUM,PORCINE/PF 100/ML (1)
100 VIAL (ML) INTRAVENOUS ONCE
Refills: 0 | Status: COMPLETED | OUTPATIENT
Start: 2024-11-23 | End: 2024-11-23

## 2024-11-23 ENCOUNTER — OUTPATIENT (OUTPATIENT)
Dept: OUTPATIENT SERVICES | Facility: HOSPITAL | Age: 84
LOS: 1 days | End: 2024-11-23
Payer: MEDICARE

## 2024-11-23 VITALS
TEMPERATURE: 99 F | OXYGEN SATURATION: 96 % | HEART RATE: 65 BPM | SYSTOLIC BLOOD PRESSURE: 129 MMHG | DIASTOLIC BLOOD PRESSURE: 73 MMHG | RESPIRATION RATE: 14 BRPM

## 2024-11-23 VITALS
TEMPERATURE: 99 F | OXYGEN SATURATION: 95 % | HEART RATE: 75 BPM | SYSTOLIC BLOOD PRESSURE: 160 MMHG | RESPIRATION RATE: 14 BRPM | DIASTOLIC BLOOD PRESSURE: 86 MMHG

## 2024-11-23 DIAGNOSIS — H26.40 UNSPECIFIED SECONDARY CATARACT: Chronic | ICD-10-CM

## 2024-11-23 DIAGNOSIS — Z90.710 ACQUIRED ABSENCE OF BOTH CERVIX AND UTERUS: Chronic | ICD-10-CM

## 2024-11-23 DIAGNOSIS — Z98.890 OTHER SPECIFIED POSTPROCEDURAL STATES: Chronic | ICD-10-CM

## 2024-11-23 DIAGNOSIS — S72.001A FRACTURE OF UNSPECIFIED PART OF NECK OF RIGHT FEMUR, INITIAL ENCOUNTER FOR CLOSED FRACTURE: Chronic | ICD-10-CM

## 2024-11-23 DIAGNOSIS — Z98.89 OTHER SPECIFIED POSTPROCEDURAL STATES: Chronic | ICD-10-CM

## 2024-11-23 DIAGNOSIS — D63.1 ANEMIA IN CHRONIC KIDNEY DISEASE: ICD-10-CM

## 2024-11-23 DIAGNOSIS — Z95.828 PRESENCE OF OTHER VASCULAR IMPLANTS AND GRAFTS: Chronic | ICD-10-CM

## 2024-11-23 LAB
ANISOCYTOSIS BLD QL: SIGNIFICANT CHANGE UP
BASO STIPL BLD QL SMEAR: PRESENT — SIGNIFICANT CHANGE UP
BLD GP AB SCN SERPL QL: SIGNIFICANT CHANGE UP
HCT VFR BLD CALC: 24.7 % — LOW (ref 34.5–45)
HGB BLD-MCNC: 8.1 G/DL — LOW (ref 11.5–15.5)
MANUAL SMEAR VERIFICATION: SIGNIFICANT CHANGE UP
MCHC RBC-ENTMCNC: 31.2 PG — SIGNIFICANT CHANGE UP (ref 27–34)
MCHC RBC-ENTMCNC: 32.8 G/DL — SIGNIFICANT CHANGE UP (ref 32–36)
MCV RBC AUTO: 95 FL — SIGNIFICANT CHANGE UP (ref 80–100)
NRBC # BLD: 10 /100 WBCS — HIGH (ref 0–0)
PLAT MORPH BLD: NORMAL — SIGNIFICANT CHANGE UP
PLATELET # BLD AUTO: 276 K/UL — SIGNIFICANT CHANGE UP (ref 150–400)
POIKILOCYTOSIS BLD QL AUTO: SLIGHT — SIGNIFICANT CHANGE UP
POLYCHROMASIA BLD QL SMEAR: SLIGHT — SIGNIFICANT CHANGE UP
RBC # BLD: 2.6 M/UL — LOW (ref 3.8–5.2)
RBC # FLD: 26.5 % — HIGH (ref 10.3–14.5)
RBC BLD AUTO: ABNORMAL
WBC # BLD: 4.58 K/UL — SIGNIFICANT CHANGE UP (ref 3.8–10.5)
WBC # FLD AUTO: 4.58 K/UL — SIGNIFICANT CHANGE UP (ref 3.8–10.5)

## 2024-11-23 PROCEDURE — 86923 COMPATIBILITY TEST ELECTRIC: CPT

## 2024-11-23 PROCEDURE — 85027 COMPLETE CBC AUTOMATED: CPT

## 2024-11-23 PROCEDURE — 36415 COLL VENOUS BLD VENIPUNCTURE: CPT

## 2024-11-23 PROCEDURE — 86900 BLOOD TYPING SEROLOGIC ABO: CPT

## 2024-11-23 PROCEDURE — 36430 TRANSFUSION BLD/BLD COMPNT: CPT

## 2024-11-23 PROCEDURE — P9040: CPT

## 2024-11-23 PROCEDURE — 86901 BLOOD TYPING SEROLOGIC RH(D): CPT

## 2024-11-23 PROCEDURE — 86850 RBC ANTIBODY SCREEN: CPT

## 2024-11-23 RX ORDER — DIPHENHYDRAMINE HCL 25 MG
25 CAPSULE ORAL ONCE
Refills: 0 | Status: COMPLETED | OUTPATIENT
Start: 2024-11-23 | End: 2024-11-23

## 2024-11-23 RX ORDER — ACETAMINOPHEN 500MG 500 MG/1
650 TABLET, COATED ORAL ONCE
Refills: 0 | Status: COMPLETED | OUTPATIENT
Start: 2024-11-23 | End: 2024-11-23

## 2024-11-23 RX ADMIN — Medication 25 MILLIGRAM(S): at 10:06

## 2024-11-23 RX ADMIN — ACETAMINOPHEN 500MG 650 MILLIGRAM(S): 500 TABLET, COATED ORAL at 10:06

## 2024-11-23 RX ADMIN — Medication 100 UNIT(S): at 17:37

## 2024-12-17 ENCOUNTER — NON-APPOINTMENT (OUTPATIENT)
Age: 84
End: 2024-12-17

## 2024-12-19 ENCOUNTER — NON-APPOINTMENT (OUTPATIENT)
Age: 84
End: 2024-12-19

## 2024-12-19 ENCOUNTER — APPOINTMENT (OUTPATIENT)
Dept: CARDIOLOGY | Facility: CLINIC | Age: 84
End: 2024-12-19
Payer: MEDICARE

## 2024-12-19 VITALS
OXYGEN SATURATION: 93 % | HEIGHT: 65 IN | SYSTOLIC BLOOD PRESSURE: 123 MMHG | DIASTOLIC BLOOD PRESSURE: 67 MMHG | HEART RATE: 77 BPM

## 2024-12-19 DIAGNOSIS — I10 ESSENTIAL (PRIMARY) HYPERTENSION: ICD-10-CM

## 2024-12-19 DIAGNOSIS — I50.9 HEART FAILURE, UNSPECIFIED: ICD-10-CM

## 2024-12-19 PROCEDURE — G2211 COMPLEX E/M VISIT ADD ON: CPT

## 2024-12-19 PROCEDURE — 99215 OFFICE O/P EST HI 40 MIN: CPT

## 2024-12-19 PROCEDURE — 93000 ELECTROCARDIOGRAM COMPLETE: CPT

## 2024-12-27 ENCOUNTER — OUTPATIENT (OUTPATIENT)
Dept: OUTPATIENT SERVICES | Facility: HOSPITAL | Age: 84
LOS: 1 days | End: 2024-12-27
Payer: MEDICARE

## 2024-12-27 VITALS
HEART RATE: 85 BPM | RESPIRATION RATE: 18 BRPM | SYSTOLIC BLOOD PRESSURE: 108 MMHG | TEMPERATURE: 98 F | OXYGEN SATURATION: 94 % | DIASTOLIC BLOOD PRESSURE: 68 MMHG

## 2024-12-27 VITALS
HEART RATE: 75 BPM | DIASTOLIC BLOOD PRESSURE: 77 MMHG | OXYGEN SATURATION: 75 % | RESPIRATION RATE: 18 BRPM | SYSTOLIC BLOOD PRESSURE: 128 MMHG | TEMPERATURE: 98 F

## 2024-12-27 DIAGNOSIS — Z95.828 PRESENCE OF OTHER VASCULAR IMPLANTS AND GRAFTS: Chronic | ICD-10-CM

## 2024-12-27 DIAGNOSIS — S72.001A FRACTURE OF UNSPECIFIED PART OF NECK OF RIGHT FEMUR, INITIAL ENCOUNTER FOR CLOSED FRACTURE: Chronic | ICD-10-CM

## 2024-12-27 DIAGNOSIS — Z98.89 OTHER SPECIFIED POSTPROCEDURAL STATES: Chronic | ICD-10-CM

## 2024-12-27 DIAGNOSIS — H26.40 UNSPECIFIED SECONDARY CATARACT: Chronic | ICD-10-CM

## 2024-12-27 DIAGNOSIS — D63.1 ANEMIA IN CHRONIC KIDNEY DISEASE: ICD-10-CM

## 2024-12-27 DIAGNOSIS — Z98.890 OTHER SPECIFIED POSTPROCEDURAL STATES: Chronic | ICD-10-CM

## 2024-12-27 DIAGNOSIS — Z90.710 ACQUIRED ABSENCE OF BOTH CERVIX AND UTERUS: Chronic | ICD-10-CM

## 2024-12-27 LAB
BLD GP AB SCN SERPL QL: SIGNIFICANT CHANGE UP
HCT VFR BLD CALC: 24.8 % — LOW (ref 34.5–45)
HGB BLD-MCNC: 8 G/DL — LOW (ref 11.5–15.5)
MCHC RBC-ENTMCNC: 31.1 PG — SIGNIFICANT CHANGE UP (ref 27–34)
MCHC RBC-ENTMCNC: 32.3 G/DL — SIGNIFICANT CHANGE UP (ref 32–36)
MCV RBC AUTO: 96.5 FL — SIGNIFICANT CHANGE UP (ref 80–100)
NRBC # BLD: 8 /100 WBCS — HIGH (ref 0–0)
PLATELET # BLD AUTO: 236 K/UL — SIGNIFICANT CHANGE UP (ref 150–400)
RBC # BLD: 2.57 M/UL — LOW (ref 3.8–5.2)
RBC # FLD: 25.8 % — HIGH (ref 10.3–14.5)
WBC # BLD: 3.24 K/UL — LOW (ref 3.8–10.5)
WBC # FLD AUTO: 3.24 K/UL — LOW (ref 3.8–10.5)

## 2024-12-27 PROCEDURE — 86923 COMPATIBILITY TEST ELECTRIC: CPT

## 2024-12-27 PROCEDURE — 36415 COLL VENOUS BLD VENIPUNCTURE: CPT

## 2024-12-27 PROCEDURE — 86900 BLOOD TYPING SEROLOGIC ABO: CPT

## 2024-12-27 PROCEDURE — 85027 COMPLETE CBC AUTOMATED: CPT

## 2024-12-27 PROCEDURE — P9040: CPT

## 2024-12-27 PROCEDURE — 36430 TRANSFUSION BLD/BLD COMPNT: CPT

## 2024-12-27 PROCEDURE — 86850 RBC ANTIBODY SCREEN: CPT

## 2024-12-27 PROCEDURE — 86901 BLOOD TYPING SEROLOGIC RH(D): CPT

## 2024-12-27 RX ORDER — DIPHENHYDRAMINE HCL 25 MG
25 TABLET ORAL ONCE
Refills: 0 | Status: COMPLETED | OUTPATIENT
Start: 2024-12-27 | End: 2024-12-27

## 2024-12-27 RX ORDER — ACETAMINOPHEN 80 MG/.8ML
650 SOLUTION/ DROPS ORAL ONCE
Refills: 0 | Status: COMPLETED | OUTPATIENT
Start: 2024-12-27 | End: 2024-12-27

## 2024-12-27 RX ORDER — HEPARIN SODIUM,PORCINE/PF 1 UNIT/ML
100 SYRINGE (ML) INTRAVENOUS ONCE
Refills: 0 | Status: COMPLETED | OUTPATIENT
Start: 2024-12-27 | End: 2024-12-27

## 2024-12-27 RX ORDER — FUROSEMIDE 20 MG
40 TABLET ORAL ONCE
Refills: 0 | Status: COMPLETED | OUTPATIENT
Start: 2024-12-27 | End: 2024-12-27

## 2024-12-27 RX ADMIN — Medication 25 MILLIGRAM(S): at 10:34

## 2024-12-27 RX ADMIN — Medication 40 MILLIGRAM(S): at 13:55

## 2024-12-27 RX ADMIN — ACETAMINOPHEN 650 MILLIGRAM(S): 80 SOLUTION/ DROPS ORAL at 10:34

## 2024-12-27 RX ADMIN — Medication 100 UNIT(S): at 17:05

## 2025-01-13 NOTE — ED PROVIDER NOTE - MDM PATIENT STATEMENT FOR ADDL TREATMENT
Subjective:       Patient ID: Nitza Khanna is a 75 y.o. female.    Chief Complaint: Ductal carcinoma in situ (DCIS) of left breast    HPI     Returns for follow up. She has h/o of DCIS  Had scope in the interval. Now lactose intolerant.   Cath in the interval as well. She now has a stent.   No bruising or bleeding.   Continues to have lower back pain- chronic   She continues to have intermittent sharp pains to right chest wall- no worse than usual. Previously imagined.  Previous RUE and right axilla US negative.   She did not set up MRI abd for nonspecific finding on the liver.  Left shoulder pain for a few months now- gets stiff.   No other complaints or issues.   No fever/chills  No CP/SOB  Appetite good. Seeing nutrition today      Reports no other changes to her history or family history.     12/6/2024 MMG:   Impression:  There is no mammographic evidence of malignancy in the left breast.  BI-RADS Category:   Overall: 2 - Benign  Recommendation:  Routine screening mammogram in 1 year is recommended.    4/29/2024 Cardiac Cath:  Summary           The Prox RCA lesion was 90% stenosed with 0% stenosis post-intervention.    Prox RCA lesion: A 4.0 x 20mm stent was successfully placed at 12 SHEA for 30 sec.    The PCI was successful.    Luminal irregularities left anterior descending artery and left circumflex artery.  Tortuous vessel consistent with hypertensive heart disease      She has a previous history of DCIS and ALH who opted out of chemoprevention due to side effects.    Presented at tumor board  DCIS (ductal carcinoma in situ) of breast     10/23/2019 Imaging Significant Findings       Mammogram  Impression:  Right  Calcifications: Right breast calcifications at the lower inner position. Assessment: 3 - Probably benign. Short Interval Follow-Up in 6 Months is recommended.         Recommendation:  Short interval follow-up is recommended in 6 Months.           11/18/2019 Biopsy       1. Right breast with  calcifications (lower inner position), stereotactic needle biopsy:  - Atypical ductal hyperplasia with associated intraluminal microcalcifications,        12/5/2019 Genetic Testing       MyRisk: negative        1/3/2020 Breast Surgery       Surgery: Dr Donnell Munoz, [phone] performed right excisional biopsy with pathology showing grade 2 ductal carcinoma in situ.        1/3/2020 Initial Diagnosis       DCIS (ductal carcinoma in situ) of breast  RIGHT BREAST, EXCISIONAL BIOPSY:  Minimal recurrent ductal carcinoma in situ (DCIS), intermediate nuclear grade        1/3/2020 Breast Tumor Markers       Estrogen Receptor: Positive >90%  Progesterone Receptor: Positive 10-50%           2/5/2020 Breast Surgery       Surgery: Dr Donnell Munoz, [phone] performed right mastectomy with sentinel lymph node biopsy with pathology showing grade 2 ductal carcinoma in situ, 2 mm with 0 positive lymph nodes.            2/5/2020 Cancer Staged       Tis N0  Stage 0 per AJCC 8th ed. pathologic prognostic staging system           3/3/2020 Tumor Conference           No further therapy recommended.        1/3/2020       Component 3 yr ago   Final Pathologic Diagnosis     RIGHT BREAST, EXCISIONAL BIOPSY:  Minimal recurrent ductal carcinoma in situ (DCIS), intermediate nuclear  grade, cribriform and papillary types (largest focus 2 mm).  Small focus of atypical apocrine adenosis.  Background breast tissue with dense stromal fibrosis and biopsy site changes  including hematoma and fat necrosis.  Surgical margins are negative for DCIS.  Negative for invasive malignancy.    Comment:  DCIS biomarkers are in progress and will be reported in an addendum.    SURGICAL PATHOLOGY CANCER CASE SUMMARY:  DCIS OF THE BREAST  Procedure:  Excision, Less than Total mastectomy  Specimen laterality:  Right  Size/extent of DCIS:  At least 2 mm  Histologic type:  DCIS  Nuclear grade:  Grade 2  Necrosis:  Not identified  Margins:  Uninvolved by DCIS     Distance from closest margin:  3 mm    Specify closest margin:  Lateral  Regional lymph nodes:  No lymph nodes submitted or found  Pathologic stage classification (pTNM): pTis (DCIS)           2/5/2020 R Mastectomy:       Component 3 yr ago   Final Pathologic Diagnosis 1.  AXILLARY SENTINEL LYMPH NODE, RIGHT, HOT BLUE 50, EXCISION:  - One benign sentinel lymph node, negative for metastatic carcinoma (0/1).  - Immunohistochemical stains for CK WSK, CK AE1/AE3, and Cam 5.2 are  negative, supporting the diagnosis.    2.  BREAST, RIGHT, MASTECTOMY:  - No residual ductal carcinoma in-situ (DCIS) in mastectomy specimen, see  Tumor Synoptic.  - Benign nipple, negative for carcinoma.  - Benign skin with dermal scar and focal ulceration, negative for carcinoma.  - Benign breast tissue with previous procedure site changes, scar, atypical  apocrine adenosis, usual ductal hyperplasia, fibrosis, and extensive reactive  epithelial changes are present  - Microcalcifications:  Seen in association with benign breast ducts.  - One benign intrammary lymph node (0/1).  - Pathologic staging: (r)pTis (sn)N0    DCIS OF THE BREAST: Resection    SPECIMEN      Procedure        Total mastectomy      Specimen Laterality        Right    TUMOR      Tumor Site        Lower inner quadrant      Histologic Type        Ductal carcinoma in situ      Size (Extent) of DCIS        Estimated size (extent) of DCIS is at least (Millimeters) 2 mm      Architectural Patterns        Cribriform        Papillary      Nuclear Grade        Grade II (intermediate)      Necrosis        Not identified      Microcalcifications        Present in DCIS        Present in nonneoplastic tissue    MARGINS      Margins        Cannot be assessed    LYMPH NODES      Regional Lymph Nodes        Uninvolved by tumor cells          Number of Lymph Nodes Examined  2          Number of [place] Nodes Examined  1      TNM Descriptors        r (recurrent)      Primary Tumor (pT)         pTis (DCIS)      Regional Lymph Nodes (pN)        Modifier          (sn): [place] node(s) evaluated        Category (pN)          pN0      Breast Biomarker Testing Performed on Previous Biopsy          Estrogen Receptor (ER)            Positive (percentage) 100 %          Progesterone Receptor (PgR)            Positive (percentage) 30 %      Testing Performed on Case Number [id number]               Oncology History: per Dr. Mohr's last note.   She was diagnosed with right breast DCIS after 12/2/14 mammogram revealed suspicious calcifications.  Biopsy 12/15/14 confirmed intermediate grade DCIS, ER + (90%), AL + (90%), Her2 elmo 1+  She underwent lumpectomy on 1/15/15 with pathology revealing low to intermediate grade cribiform DCIS, 16 mm in diameter.   She required re-excision on 2/12/15 for a close inferior margin with negative pathology.      She also has a history of left breast DCIS diagnosed in 2004 and treated with lumpectomy/XRT.  She has a history if right breast ALH in 10/2009 that was treated with lumpectomy.      She has never received endocrine therapy previously.       Review of Systems   Constitutional:         See above   All other systems reviewed and are negative.      Objective:      Physical Exam  Vitals and nursing note reviewed.   Constitutional:       General: She is not in acute distress.     Appearance: She is well-developed. She is not diaphoretic.      Comments: Presents alone   HENT:      Head: Normocephalic and atraumatic.      Mouth/Throat:      Pharynx: No oropharyngeal exudate.   Eyes:      General: No scleral icterus (mri abd).     Conjunctiva/sclera: Conjunctivae normal.      Pupils: Pupils are equal, round, and reactive to light.   Neck:      Thyroid: No thyromegaly.   Cardiovascular:      Rate and Rhythm: Normal rate and regular rhythm.      Heart sounds: No murmur heard.     No friction rub. No gallop.   Pulmonary:      Effort: Pulmonary effort is normal. No respiratory  distress.      Breath sounds: Normal breath sounds. No wheezing or rales.      Comments: Post right breast mastectomy- well- healed. Mild swelling in right supraclavicular area and R axilla as previous.  No masses or LAD.   Left breast with well healed biopsy scar at lateral aspect of breast. No axillary or supraclavicular adenopathy.   Right arm mild lymphedema  Chest:      Chest wall: No tenderness.   Abdominal:      General: Bowel sounds are normal. There is no distension.      Palpations: Abdomen is soft. There is no mass.      Tenderness: There is no abdominal tenderness.      Comments: No hepatosplenomegaly   Musculoskeletal:         General: No tenderness or deformity. Normal range of motion.      Cervical back: Normal range of motion and neck supple.   Lymphadenopathy:      Cervical: No cervical adenopathy.   Skin:     General: Skin is warm and dry.      Coloration: Skin is not pale.      Findings: No erythema or rash.   Neurological:      Mental Status: She is alert and oriented to person, place, and time.      Motor: No abnormal muscle tone.      Coordination: Coordination normal.   Psychiatric:         Behavior: Behavior normal.         Thought Content: Thought content normal.         Judgment: Judgment normal.      Labs- most recent reviewed   Assessment:       1. Encounter for screening mammogram for breast cancer    2. Liver lesion    3. Left shoulder pain, unspecified chronicity    4. Ductal carcinoma in situ (DCIS) of left breast    5. Ductal carcinoma in situ (DCIS) of right breast    6. Atypical ductal hyperplasia of right breast              Plan:       Clinically negative  L MMG due 12/2025  Needs MRI Abd- attention to liver  Resume Vit D 2000u daily as had stopped  Refer to ortho for shoulder pain  RTC 1 year    Continue to follow up with PCP for other health maintenance. Reminded the need for Vit D yearly.       Route Chart for Scheduling    Med Onc Chart Routing  Urgent    Follow up with  Patient with one or more new problems requiring additional work-up/treatment. physician    Follow up with [facility] 1 year.   Infusion scheduling note    Injection scheduling note    Labs    Imaging   MRI abd now please, L MMG 12/2025   Pharmacy appointment    Other referrals         Refer to ortho             Patient is in agreement with the proposed treatment plan. All questions were answered to the patient's satisfaction. Pt knows to call clinic for any new or worsening symptoms and if anything is needed before the next clinic visit.      Patient is in agreement with the proposed treatment plan. All questions were answered to the patient's satisfaction. Pt knows to call clinic for any new or worsening symptoms and if anything is needed before the next clinic visit.    Latonya Elise, MSN, APRN, FNP-C  Nurse Practitioner to Dr. Grecia Mohr  [facility]  [facility]  Hematology & Medical Oncology  [address] [place] [id number]  ph. [phone] ext [phone]  Fax. [phone]              30 minutes of total time spent on the encounter, which includes face to face time and non-face to face time preparing to see the patient (eg, review of tests), Obtaining and/or reviewing separately obtained history, Documenting clinical information in the electronic or other health record, Independently interpreting results (not separately reported) and communicating results to the patient/family/caregiver, or Care coordination (not separately reported).

## 2025-01-24 ENCOUNTER — OUTPATIENT (OUTPATIENT)
Dept: OUTPATIENT SERVICES | Facility: HOSPITAL | Age: 85
LOS: 1 days | End: 2025-01-24
Payer: MEDICARE

## 2025-01-24 VITALS
OXYGEN SATURATION: 94 % | DIASTOLIC BLOOD PRESSURE: 89 MMHG | TEMPERATURE: 98 F | SYSTOLIC BLOOD PRESSURE: 139 MMHG | HEART RATE: 72 BPM | RESPIRATION RATE: 18 BRPM

## 2025-01-24 VITALS
RESPIRATION RATE: 20 BRPM | HEART RATE: 66 BPM | OXYGEN SATURATION: 94 % | SYSTOLIC BLOOD PRESSURE: 138 MMHG | DIASTOLIC BLOOD PRESSURE: 76 MMHG | TEMPERATURE: 98 F

## 2025-01-24 DIAGNOSIS — Z90.710 ACQUIRED ABSENCE OF BOTH CERVIX AND UTERUS: Chronic | ICD-10-CM

## 2025-01-24 DIAGNOSIS — Z98.890 OTHER SPECIFIED POSTPROCEDURAL STATES: Chronic | ICD-10-CM

## 2025-01-24 DIAGNOSIS — Z98.89 OTHER SPECIFIED POSTPROCEDURAL STATES: Chronic | ICD-10-CM

## 2025-01-24 DIAGNOSIS — S72.001A FRACTURE OF UNSPECIFIED PART OF NECK OF RIGHT FEMUR, INITIAL ENCOUNTER FOR CLOSED FRACTURE: Chronic | ICD-10-CM

## 2025-01-24 DIAGNOSIS — D63.1 ANEMIA IN CHRONIC KIDNEY DISEASE: ICD-10-CM

## 2025-01-24 DIAGNOSIS — Z95.828 PRESENCE OF OTHER VASCULAR IMPLANTS AND GRAFTS: Chronic | ICD-10-CM

## 2025-01-24 DIAGNOSIS — H26.40 UNSPECIFIED SECONDARY CATARACT: Chronic | ICD-10-CM

## 2025-01-24 LAB
BLD GP AB SCN SERPL QL: SIGNIFICANT CHANGE UP
HCT VFR BLD CALC: 27.4 % — LOW (ref 34.5–45)
HGB BLD-MCNC: 8.4 G/DL — LOW (ref 11.5–15.5)
MCHC RBC-ENTMCNC: 30.7 G/DL — LOW (ref 32–36)
MCHC RBC-ENTMCNC: 31 PG — SIGNIFICANT CHANGE UP (ref 27–34)
MCV RBC AUTO: 101.1 FL — HIGH (ref 80–100)
NRBC # BLD: 8 /100 WBCS — HIGH (ref 0–0)
NRBC BLD-RTO: 8 /100 WBCS — HIGH (ref 0–0)
PLATELET # BLD AUTO: 242 K/UL — SIGNIFICANT CHANGE UP (ref 150–400)
RBC # BLD: 2.71 M/UL — LOW (ref 3.8–5.2)
RBC # FLD: 27.1 % — HIGH (ref 10.3–14.5)
WBC # BLD: 3.38 K/UL — LOW (ref 3.8–10.5)
WBC # FLD AUTO: 3.38 K/UL — LOW (ref 3.8–10.5)

## 2025-01-24 PROCEDURE — P9040: CPT

## 2025-01-24 PROCEDURE — 36430 TRANSFUSION BLD/BLD COMPNT: CPT

## 2025-01-24 PROCEDURE — 86900 BLOOD TYPING SEROLOGIC ABO: CPT

## 2025-01-24 PROCEDURE — 36415 COLL VENOUS BLD VENIPUNCTURE: CPT

## 2025-01-24 PROCEDURE — 86850 RBC ANTIBODY SCREEN: CPT

## 2025-01-24 PROCEDURE — 86923 COMPATIBILITY TEST ELECTRIC: CPT

## 2025-01-24 PROCEDURE — 85027 COMPLETE CBC AUTOMATED: CPT

## 2025-01-24 PROCEDURE — 86901 BLOOD TYPING SEROLOGIC RH(D): CPT

## 2025-01-24 RX ORDER — DIPHENHYDRAMINE HCL 25 MG
25 CAPSULE ORAL ONCE
Refills: 0 | Status: COMPLETED | OUTPATIENT
Start: 2025-01-24 | End: 2025-01-24

## 2025-01-24 RX ORDER — HEPARIN SOD,PORCINE/0.9 % NACL 5K/1000 ML
300 INTRAVENOUS SOLUTION INTRAVENOUS ONCE
Refills: 0 | Status: COMPLETED | OUTPATIENT
Start: 2025-01-24 | End: 2025-01-24

## 2025-01-24 RX ORDER — ACETAMINOPHEN 160 MG/5ML
650 SUSPENSION ORAL ONCE
Refills: 0 | Status: COMPLETED | OUTPATIENT
Start: 2025-01-24 | End: 2025-01-24

## 2025-01-24 RX ADMIN — Medication 25 MILLIGRAM(S): at 11:17

## 2025-01-24 RX ADMIN — Medication 40 MILLIGRAM(S): at 14:59

## 2025-01-24 RX ADMIN — Medication 300 UNIT(S): at 18:46

## 2025-01-24 RX ADMIN — ACETAMINOPHEN 650 MILLIGRAM(S): 160 SUSPENSION ORAL at 11:17

## 2025-01-24 RX ADMIN — ACETAMINOPHEN 650 MILLIGRAM(S): 160 SUSPENSION ORAL at 11:40

## 2025-02-14 ENCOUNTER — APPOINTMENT (OUTPATIENT)
Dept: RHEUMATOLOGY | Facility: CLINIC | Age: 85
End: 2025-02-14

## 2025-02-14 ENCOUNTER — LABORATORY RESULT (OUTPATIENT)
Age: 85
End: 2025-02-14

## 2025-02-14 VITALS
WEIGHT: 142 LBS | OXYGEN SATURATION: 94 % | HEART RATE: 68 BPM | HEIGHT: 65 IN | SYSTOLIC BLOOD PRESSURE: 126 MMHG | BODY MASS INDEX: 23.66 KG/M2 | DIASTOLIC BLOOD PRESSURE: 74 MMHG

## 2025-02-14 DIAGNOSIS — M54.50 LOW BACK PAIN, UNSPECIFIED: ICD-10-CM

## 2025-02-14 PROCEDURE — 99214 OFFICE O/P EST MOD 30 MIN: CPT

## 2025-02-14 RX ORDER — PREDNISONE 10 MG/1
10 TABLET ORAL DAILY
Qty: 30 | Refills: 0 | Status: ACTIVE | COMMUNITY
Start: 2025-02-14 | End: 1900-01-01

## 2025-02-15 LAB
ALBUMIN SERPL ELPH-MCNC: 4.6 G/DL
ALP BLD-CCNC: 44 U/L
ALT SERPL-CCNC: 17 U/L
ANION GAP SERPL CALC-SCNC: 10 MMOL/L
AST SERPL-CCNC: 13 U/L
BASOPHILS # BLD AUTO: 0 K/UL
BASOPHILS NFR BLD AUTO: 0 %
BILIRUB SERPL-MCNC: 0.4 MG/DL
BUN SERPL-MCNC: 34 MG/DL
CALCIUM SERPL-MCNC: 9.9 MG/DL
CHLORIDE SERPL-SCNC: 100 MMOL/L
CO2 SERPL-SCNC: 27 MMOL/L
CREAT SERPL-MCNC: 1.33 MG/DL
CRP SERPL-MCNC: <3 MG/L
EGFR: 40 ML/MIN/1.73M2
EOSINOPHIL # BLD AUTO: 0 K/UL
EOSINOPHIL NFR BLD AUTO: 0 %
ERYTHROCYTE [SEDIMENTATION RATE] IN BLOOD BY WESTERGREN METHOD: 2 MM/HR
GLUCOSE SERPL-MCNC: 101 MG/DL
HCT VFR BLD CALC: 32.4 %
HGB BLD-MCNC: 10.2 G/DL
LYMPHOCYTES # BLD AUTO: 0.96 K/UL
LYMPHOCYTES NFR BLD AUTO: 20.5 %
MAN DIFF?: NORMAL
MCHC RBC-ENTMCNC: 31.5 G/DL
MCHC RBC-ENTMCNC: 31.9 PG
MCV RBC AUTO: 101.3 FL
MONOCYTES # BLD AUTO: 0.79 K/UL
MONOCYTES NFR BLD AUTO: 17 %
NEUTROPHILS # BLD AUTO: 2.79 K/UL
NEUTROPHILS NFR BLD AUTO: 59.8 %
PLATELET # BLD AUTO: 327 K/UL
POTASSIUM SERPL-SCNC: 5.8 MMOL/L
PROT SERPL-MCNC: 6.2 G/DL
RBC # BLD: 3.2 M/UL
RBC # FLD: 24.5 %
SODIUM SERPL-SCNC: 137 MMOL/L
WBC # FLD AUTO: 4.66 K/UL

## 2025-04-07 ENCOUNTER — NON-APPOINTMENT (OUTPATIENT)
Age: 85
End: 2025-04-07

## 2025-04-08 ENCOUNTER — APPOINTMENT (OUTPATIENT)
Dept: CARDIOLOGY | Facility: CLINIC | Age: 85
End: 2025-04-08
Payer: MEDICARE

## 2025-04-08 VITALS
DIASTOLIC BLOOD PRESSURE: 79 MMHG | SYSTOLIC BLOOD PRESSURE: 124 MMHG | HEART RATE: 69 BPM | HEIGHT: 65 IN | OXYGEN SATURATION: 92 %

## 2025-04-08 DIAGNOSIS — R06.02 SHORTNESS OF BREATH: ICD-10-CM

## 2025-04-08 DIAGNOSIS — I10 ESSENTIAL (PRIMARY) HYPERTENSION: ICD-10-CM

## 2025-04-08 DIAGNOSIS — I50.9 HEART FAILURE, UNSPECIFIED: ICD-10-CM

## 2025-04-08 PROCEDURE — 93306 TTE W/DOPPLER COMPLETE: CPT

## 2025-04-08 PROCEDURE — 93000 ELECTROCARDIOGRAM COMPLETE: CPT

## 2025-04-08 PROCEDURE — 99214 OFFICE O/P EST MOD 30 MIN: CPT

## 2025-04-08 PROCEDURE — G2211 COMPLEX E/M VISIT ADD ON: CPT

## 2025-04-11 ENCOUNTER — APPOINTMENT (OUTPATIENT)
Dept: INTERNAL MEDICINE | Facility: CLINIC | Age: 85
End: 2025-04-11
Payer: MEDICARE

## 2025-04-11 VITALS
HEIGHT: 65 IN | RESPIRATION RATE: 16 BRPM | BODY MASS INDEX: 23.66 KG/M2 | OXYGEN SATURATION: 93 % | TEMPERATURE: 98.2 F | WEIGHT: 142 LBS | SYSTOLIC BLOOD PRESSURE: 102 MMHG | DIASTOLIC BLOOD PRESSURE: 64 MMHG | HEART RATE: 74 BPM

## 2025-04-11 DIAGNOSIS — I48.0 PAROXYSMAL ATRIAL FIBRILLATION: ICD-10-CM

## 2025-04-11 DIAGNOSIS — Z01.818 ENCOUNTER FOR OTHER PREPROCEDURAL EXAMINATION: ICD-10-CM

## 2025-04-11 DIAGNOSIS — D64.9 ANEMIA, UNSPECIFIED: ICD-10-CM

## 2025-04-11 PROCEDURE — 99214 OFFICE O/P EST MOD 30 MIN: CPT

## 2025-04-13 LAB
ALBUMIN SERPL ELPH-MCNC: 4.4 G/DL
ALP BLD-CCNC: 39 U/L
ALT SERPL-CCNC: 9 U/L
ANION GAP SERPL CALC-SCNC: 10 MMOL/L
AST SERPL-CCNC: 8 U/L
BILIRUB SERPL-MCNC: 0.5 MG/DL
BUN SERPL-MCNC: 31 MG/DL
CALCIUM SERPL-MCNC: 9.8 MG/DL
CHLORIDE SERPL-SCNC: 103 MMOL/L
CO2 SERPL-SCNC: 24 MMOL/L
CREAT SERPL-MCNC: 1.26 MG/DL
EGFRCR SERPLBLD CKD-EPI 2021: 43 ML/MIN/1.73M2
ESTIMATED AVERAGE GLUCOSE: 105 MG/DL
GLUCOSE SERPL-MCNC: 130 MG/DL
HBA1C MFR BLD HPLC: 5.3 %
POTASSIUM SERPL-SCNC: 4.9 MMOL/L
PROT SERPL-MCNC: 6 G/DL
SODIUM SERPL-SCNC: 138 MMOL/L

## 2025-04-16 ENCOUNTER — RX RENEWAL (OUTPATIENT)
Age: 85
End: 2025-04-16

## 2025-05-05 ENCOUNTER — OUTPATIENT (OUTPATIENT)
Dept: OUTPATIENT SERVICES | Facility: HOSPITAL | Age: 85
LOS: 1 days | End: 2025-05-05
Payer: MEDICARE

## 2025-05-05 ENCOUNTER — TRANSCRIPTION ENCOUNTER (OUTPATIENT)
Age: 85
End: 2025-05-05

## 2025-05-05 VITALS
HEART RATE: 68 BPM | RESPIRATION RATE: 18 BRPM | OXYGEN SATURATION: 98 % | SYSTOLIC BLOOD PRESSURE: 110 MMHG | DIASTOLIC BLOOD PRESSURE: 52 MMHG

## 2025-05-05 VITALS
HEART RATE: 71 BPM | DIASTOLIC BLOOD PRESSURE: 47 MMHG | SYSTOLIC BLOOD PRESSURE: 118 MMHG | WEIGHT: 148.59 LBS | TEMPERATURE: 98 F | OXYGEN SATURATION: 93 % | RESPIRATION RATE: 19 BRPM | HEIGHT: 63 IN

## 2025-05-05 DIAGNOSIS — Z95.828 PRESENCE OF OTHER VASCULAR IMPLANTS AND GRAFTS: Chronic | ICD-10-CM

## 2025-05-05 DIAGNOSIS — Z98.89 OTHER SPECIFIED POSTPROCEDURAL STATES: Chronic | ICD-10-CM

## 2025-05-05 DIAGNOSIS — Z90.710 ACQUIRED ABSENCE OF BOTH CERVIX AND UTERUS: Chronic | ICD-10-CM

## 2025-05-05 DIAGNOSIS — Z98.890 OTHER SPECIFIED POSTPROCEDURAL STATES: Chronic | ICD-10-CM

## 2025-05-05 DIAGNOSIS — H04.223 EPIPHORA DUE TO INSUFFICIENT DRAINAGE, BILATERAL: ICD-10-CM

## 2025-05-05 DIAGNOSIS — S72.001A FRACTURE OF UNSPECIFIED PART OF NECK OF RIGHT FEMUR, INITIAL ENCOUNTER FOR CLOSED FRACTURE: Chronic | ICD-10-CM

## 2025-05-05 DIAGNOSIS — H26.40 UNSPECIFIED SECONDARY CATARACT: Chronic | ICD-10-CM

## 2025-05-05 PROCEDURE — 68815 PROBE NASOLACRIMAL DUCT: CPT | Mod: E4,E2

## 2025-05-05 PROCEDURE — L8610: CPT

## 2025-05-05 PROCEDURE — 67950 REVISION OF EYELID: CPT | Mod: E4,E2

## 2025-05-05 PROCEDURE — ZZZZZ: CPT

## 2025-05-05 DEVICE — IMPLANTABLE DEVICE: Type: IMPLANTABLE DEVICE | Site: BILATERAL | Status: FUNCTIONAL

## 2025-05-05 NOTE — ASU DISCHARGE PLAN (ADULT/PEDIATRIC) - PROVIDER TOKENS
PROVIDER:[TOKEN:[[id number]],SCHEDULEDAPPT:[05/06/2025],SCHEDULEDAPPTTIME:[01:00 PM],ESTABLISHEDPATIENT:[T]]

## 2025-05-05 NOTE — ASU PATIENT PROFILE, ADULT - PRO MENTAL HEALTH SX RECENT
none
Patient will perform bed<>chair transfer with minimal assistance with use of appropriate assistive device by discharge.

## 2025-05-05 NOTE — ASU DISCHARGE PLAN (ADULT/PEDIATRIC) - LEAVE PATCH AND SHIELD ON UNTIL YOU SEE DOCTOR
Patient reports she had cataract surgery 2 weeks ago. Patient reports she is healing and still taking the drops. Patient is concerned because yesterday she had dinner at her daughter in law and was later told that the daughter in law tested positive for covid at home. Statement Selected

## 2025-05-05 NOTE — ASU DISCHARGE PLAN (ADULT/PEDIATRIC) - ASU DC SPECIAL INSTRUCTIONSFT
Continue Home Medication Regimen as per your Primary Care Provider    Apply Ice to Both Eyes     Follow up in Dr Fields's office tomorrow at 1pm

## 2025-05-05 NOTE — ASU PATIENT PROFILE, ADULT - FALL HARM RISK - HARM RISK INTERVENTIONS
Assistance with ambulation/Assistance OOB with selected safe patient handling equipment/Communicate Risk of Fall with Harm to all staff/Discuss with provider need for PT consult/Monitor for mental status changes/Monitor gait and stability/Provide patient with walking aids - walker, cane, crutches/Reinforce activity limits and safety measures with patient and family/Tailored Fall Risk Interventions/Visual Cue: Yellow wristband and red socks/Bed in lowest position, wheels locked, appropriate side rails in place/Call bell, personal items and telephone in reach/Instruct patient to call for assistance before getting out of bed or chair/Non-slip footwear when patient is out of bed/[place] to call system/Physically safe environment - no spills, clutter or unnecessary equipment/Purposeful Proactive Rounding/Room/bathroom lighting operational, light cord in reach

## 2025-05-05 NOTE — ASU PATIENT PROFILE, ADULT - NSICDXPASTSURGICALHX_GEN_ALL_CORE_FT
PAST SURGICAL HISTORY:  After cataract bilateral eye cataract surgically removed    Closed right hip fracture rigth hip ORIF july 19 2016    H/O kyphoplasty     Port-A-Cath in place right chest    S/P carpal tunnel release Right 2008 & 6/27/17    S/P foot surgery     S/P hysterectomy     S/P IVC filter nov 2016    S/P knee surgery

## 2025-05-05 NOTE — ASU DISCHARGE PLAN (ADULT/PEDIATRIC) - CARE PROVIDER_API CALL
Elsa Fields  Ophthalmology  [address] [place] [id number]  Phone: [phone]  Fax: [phone]  Established Patient  Scheduled Appointment: 05/06/2025 01:00 PM

## 2025-05-05 NOTE — ASU DISCHARGE PLAN (ADULT/PEDIATRIC) - FINANCIAL ASSISTANCE
[facility] provides services at a reduced cost to those who are determined to be eligible through [facility]’s financial assistance program. Information regarding [facility]’s financial assistance program can be found by going to [email or web address] or by calling [phone].

## 2025-05-05 NOTE — ASU PATIENT PROFILE, ADULT - NSICDXPASTMEDICALHX_GEN_ALL_CORE_FT
PAST MEDICAL HISTORY:  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     Chronic kidney disease (CKD)     COPD (chronic obstructive pulmonary disease)     DM (diabetes mellitus) diet-controlled    H/O aplastic anemia     H/O hiatal hernia     H/O osteoporosis     H/O pulmonary fibrosis     H/O sinus bradycardia     Hiatal hernia     History of basal cell cancer     History of fracture of right hip "unoperable"    History of IBS     History of immunodeficiency     HLD (hyperlipidemia)     Hypotension     Lumbar compression fracture     PAF (paroxysmal atrial fibrillation) s/p ablation    PMR (polymyalgia rheumatica)     Presence of IVC filter

## 2025-05-14 PROBLEM — Z87.81 PERSONAL HISTORY OF (HEALED) TRAUMATIC FRACTURE: Chronic | Status: ACTIVE | Noted: 2025-05-05

## 2025-05-15 ENCOUNTER — OUTPATIENT (OUTPATIENT)
Dept: OUTPATIENT SERVICES | Facility: HOSPITAL | Age: 85
LOS: 1 days | End: 2025-05-15
Payer: MEDICARE

## 2025-05-15 VITALS
OXYGEN SATURATION: 92 % | TEMPERATURE: 99 F | HEART RATE: 88 BPM | DIASTOLIC BLOOD PRESSURE: 72 MMHG | SYSTOLIC BLOOD PRESSURE: 125 MMHG | RESPIRATION RATE: 19 BRPM

## 2025-05-15 VITALS
TEMPERATURE: 98 F | OXYGEN SATURATION: 90 % | SYSTOLIC BLOOD PRESSURE: 123 MMHG | DIASTOLIC BLOOD PRESSURE: 74 MMHG | RESPIRATION RATE: 19 BRPM | HEART RATE: 84 BPM

## 2025-05-15 DIAGNOSIS — Z98.890 OTHER SPECIFIED POSTPROCEDURAL STATES: Chronic | ICD-10-CM

## 2025-05-15 DIAGNOSIS — Z90.710 ACQUIRED ABSENCE OF BOTH CERVIX AND UTERUS: Chronic | ICD-10-CM

## 2025-05-15 DIAGNOSIS — Z95.828 PRESENCE OF OTHER VASCULAR IMPLANTS AND GRAFTS: Chronic | ICD-10-CM

## 2025-05-15 DIAGNOSIS — Z98.89 OTHER SPECIFIED POSTPROCEDURAL STATES: Chronic | ICD-10-CM

## 2025-05-15 DIAGNOSIS — D63.1 ANEMIA IN CHRONIC KIDNEY DISEASE: ICD-10-CM

## 2025-05-15 DIAGNOSIS — H26.40 UNSPECIFIED SECONDARY CATARACT: Chronic | ICD-10-CM

## 2025-05-15 DIAGNOSIS — S72.001A FRACTURE OF UNSPECIFIED PART OF NECK OF RIGHT FEMUR, INITIAL ENCOUNTER FOR CLOSED FRACTURE: Chronic | ICD-10-CM

## 2025-05-15 LAB
BLD GP AB SCN SERPL QL: SIGNIFICANT CHANGE UP
HCT VFR BLD CALC: 22.1 % — LOW (ref 34.5–45)
HGB BLD-MCNC: 7.1 G/DL — LOW (ref 11.5–15.5)
MCHC RBC-ENTMCNC: 32.1 G/DL — SIGNIFICANT CHANGE UP (ref 32–36)
MCHC RBC-ENTMCNC: 33.8 PG — SIGNIFICANT CHANGE UP (ref 27–34)
MCV RBC AUTO: 105.2 FL — HIGH (ref 80–100)
NRBC BLD AUTO-RTO: 2 /100 WBCS — HIGH (ref 0–0)
PLATELET # BLD AUTO: 286 K/UL — SIGNIFICANT CHANGE UP (ref 150–400)
RBC # BLD: 2.1 M/UL — LOW (ref 3.8–5.2)
RBC # FLD: 25.5 % — HIGH (ref 10.3–14.5)
WBC # BLD: 3.35 K/UL — LOW (ref 3.8–10.5)
WBC # FLD AUTO: 3.35 K/UL — LOW (ref 3.8–10.5)

## 2025-05-15 PROCEDURE — 36430 TRANSFUSION BLD/BLD COMPNT: CPT

## 2025-05-15 PROCEDURE — 36415 COLL VENOUS BLD VENIPUNCTURE: CPT

## 2025-05-15 PROCEDURE — 86850 RBC ANTIBODY SCREEN: CPT

## 2025-05-15 PROCEDURE — 86900 BLOOD TYPING SEROLOGIC ABO: CPT

## 2025-05-15 PROCEDURE — P9040: CPT

## 2025-05-15 PROCEDURE — 85027 COMPLETE CBC AUTOMATED: CPT

## 2025-05-15 PROCEDURE — 96374 THER/PROPH/DIAG INJ IV PUSH: CPT

## 2025-05-15 PROCEDURE — 86901 BLOOD TYPING SEROLOGIC RH(D): CPT

## 2025-05-15 PROCEDURE — 86923 COMPATIBILITY TEST ELECTRIC: CPT

## 2025-05-15 RX ORDER — ACETAMINOPHEN 500 MG/5ML
650 LIQUID (ML) ORAL ONCE
Refills: 0 | Status: COMPLETED | OUTPATIENT
Start: 2025-05-15 | End: 2025-05-15

## 2025-05-15 RX ORDER — FUROSEMIDE 10 MG/ML
40 INJECTION INTRAMUSCULAR; INTRAVENOUS ONCE
Refills: 0 | Status: COMPLETED | OUTPATIENT
Start: 2025-05-15 | End: 2025-05-15

## 2025-05-15 RX ORDER — DIPHENHYDRAMINE HCL 12.5MG/5ML
25 ELIXIR ORAL ONCE
Refills: 0 | Status: COMPLETED | OUTPATIENT
Start: 2025-05-15 | End: 2025-05-15

## 2025-05-15 RX ORDER — HEPARIN SODIUM,PORCINE/NS/PF 20/20 ML
300 SYRINGE (ML) INTRAVENOUS ONCE
Refills: 0 | Status: COMPLETED | OUTPATIENT
Start: 2025-05-15 | End: 2025-05-15

## 2025-05-15 RX ADMIN — FUROSEMIDE 40 MILLIGRAM(S): 10 INJECTION INTRAMUSCULAR; INTRAVENOUS at 12:32

## 2025-05-15 RX ADMIN — Medication 300 UNIT(S): at 16:55

## 2025-05-15 RX ADMIN — Medication 25 MILLIGRAM(S): at 09:34

## 2025-05-15 RX ADMIN — Medication 650 MILLIGRAM(S): at 09:34

## 2025-05-15 NOTE — PROVIDER CONTACT NOTE (OTHER) - BACKGROUND
Pt take torsemide every other day but was not due for dose today  Pt typically receives lasix in between units of blood when she comes for transfusions

## 2025-05-15 NOTE — PROVIDER CONTACT NOTE (OTHER) - SITUATION
Pt here for outpatient blood transfusion with mild wheezing and O2 of 91% on RA  No diuretic ordered

## 2025-05-15 NOTE — PROVIDER CONTACT NOTE (OTHER) - ACTION/TREATMENT ORDERED:
MD notified  Per MD lanier to place telephone order for 40 mg IV lasix  to give after 1st unit PRBC is completed  MD to be in hospital to assess patient

## 2025-05-16 ENCOUNTER — APPOINTMENT (OUTPATIENT)
Dept: RHEUMATOLOGY | Facility: CLINIC | Age: 85
End: 2025-05-16
Payer: MEDICARE

## 2025-05-16 ENCOUNTER — LABORATORY RESULT (OUTPATIENT)
Age: 85
End: 2025-05-16

## 2025-05-16 PROCEDURE — 99214 OFFICE O/P EST MOD 30 MIN: CPT

## 2025-05-18 LAB
ALBUMIN SERPL ELPH-MCNC: 4.5 G/DL
ALP BLD-CCNC: 41 U/L
ALT SERPL-CCNC: 8 U/L
ANION GAP SERPL CALC-SCNC: 12 MMOL/L
AST SERPL-CCNC: 14 U/L
BASOPHILS # BLD AUTO: 0.03 K/UL
BASOPHILS NFR BLD AUTO: 0.7 %
BILIRUB SERPL-MCNC: 0.7 MG/DL
BUN SERPL-MCNC: 26 MG/DL
CALCIUM SERPL-MCNC: 10 MG/DL
CHLORIDE SERPL-SCNC: 100 MMOL/L
CO2 SERPL-SCNC: 27 MMOL/L
CREAT SERPL-MCNC: 1.24 MG/DL
CRP SERPL-MCNC: 3 MG/L
EGFRCR SERPLBLD CKD-EPI 2021: 43 ML/MIN/1.73M2
EOSINOPHIL # BLD AUTO: 0 K/UL
EOSINOPHIL NFR BLD AUTO: 0 %
ERYTHROCYTE [SEDIMENTATION RATE] IN BLOOD BY WESTERGREN METHOD: 2 MM/HR
GLUCOSE SERPL-MCNC: 95 MG/DL
HCT VFR BLD CALC: 30.7 %
HGB BLD-MCNC: 10.1 G/DL
IMM GRANULOCYTES NFR BLD AUTO: 1.8 %
LYMPHOCYTES # BLD AUTO: 0.95 K/UL
LYMPHOCYTES NFR BLD AUTO: 21.3 %
MAN DIFF?: NORMAL
MCHC RBC-ENTMCNC: 31.6 PG
MCHC RBC-ENTMCNC: 32.9 G/DL
MCV RBC AUTO: 95.9 FL
MONOCYTES # BLD AUTO: 0.95 K/UL
MONOCYTES NFR BLD AUTO: 21.3 %
NEUTROPHILS # BLD AUTO: 2.45 K/UL
NEUTROPHILS NFR BLD AUTO: 54.9 %
PLATELET # BLD AUTO: 301 K/UL
PMV BLD AUTO: 2 /100 WBCS
POTASSIUM SERPL-SCNC: 4.7 MMOL/L
PROT SERPL-MCNC: 6.3 G/DL
RBC # BLD: 3.2 M/UL
RBC # FLD: 26.5 %
SODIUM SERPL-SCNC: 139 MMOL/L
WBC # FLD AUTO: 4.46 K/UL

## 2025-05-26 ENCOUNTER — INPATIENT (INPATIENT)
Facility: HOSPITAL | Age: 85
LOS: 9 days | Discharge: NURSING, REHAB OR LONG-TERM CARE | DRG: 192 | End: 2025-06-05
Attending: STUDENT IN AN ORGANIZED HEALTH CARE EDUCATION/TRAINING PROGRAM | Admitting: STUDENT IN AN ORGANIZED HEALTH CARE EDUCATION/TRAINING PROGRAM
Payer: MEDICARE

## 2025-05-26 VITALS
TEMPERATURE: 98 F | RESPIRATION RATE: 26 BRPM | OXYGEN SATURATION: 96 % | WEIGHT: 141.98 LBS | DIASTOLIC BLOOD PRESSURE: 77 MMHG | HEART RATE: 77 BPM | SYSTOLIC BLOOD PRESSURE: 130 MMHG | HEIGHT: 63 IN

## 2025-05-26 DIAGNOSIS — I48.91 UNSPECIFIED ATRIAL FIBRILLATION: ICD-10-CM

## 2025-05-26 DIAGNOSIS — Z90.710 ACQUIRED ABSENCE OF BOTH CERVIX AND UTERUS: Chronic | ICD-10-CM

## 2025-05-26 DIAGNOSIS — Z98.890 OTHER SPECIFIED POSTPROCEDURAL STATES: Chronic | ICD-10-CM

## 2025-05-26 DIAGNOSIS — J44.1 CHRONIC OBSTRUCTIVE PULMONARY DISEASE WITH (ACUTE) EXACERBATION: ICD-10-CM

## 2025-05-26 DIAGNOSIS — D61.9 APLASTIC ANEMIA, UNSPECIFIED: ICD-10-CM

## 2025-05-26 DIAGNOSIS — I95.9 HYPOTENSION, UNSPECIFIED: ICD-10-CM

## 2025-05-26 DIAGNOSIS — S72.001A FRACTURE OF UNSPECIFIED PART OF NECK OF RIGHT FEMUR, INITIAL ENCOUNTER FOR CLOSED FRACTURE: Chronic | ICD-10-CM

## 2025-05-26 DIAGNOSIS — D53.9 NUTRITIONAL ANEMIA, UNSPECIFIED: ICD-10-CM

## 2025-05-26 DIAGNOSIS — E11.9 TYPE 2 DIABETES MELLITUS WITHOUT COMPLICATIONS: ICD-10-CM

## 2025-05-26 DIAGNOSIS — J96.01 ACUTE RESPIRATORY FAILURE WITH HYPOXIA: ICD-10-CM

## 2025-05-26 DIAGNOSIS — H26.40 UNSPECIFIED SECONDARY CATARACT: Chronic | ICD-10-CM

## 2025-05-26 DIAGNOSIS — J44.9 CHRONIC OBSTRUCTIVE PULMONARY DISEASE, UNSPECIFIED: ICD-10-CM

## 2025-05-26 DIAGNOSIS — Z95.828 PRESENCE OF OTHER VASCULAR IMPLANTS AND GRAFTS: Chronic | ICD-10-CM

## 2025-05-26 DIAGNOSIS — E78.5 HYPERLIPIDEMIA, UNSPECIFIED: ICD-10-CM

## 2025-05-26 DIAGNOSIS — M35.3 POLYMYALGIA RHEUMATICA: ICD-10-CM

## 2025-05-26 DIAGNOSIS — I10 ESSENTIAL (PRIMARY) HYPERTENSION: ICD-10-CM

## 2025-05-26 DIAGNOSIS — Z98.89 OTHER SPECIFIED POSTPROCEDURAL STATES: Chronic | ICD-10-CM

## 2025-05-26 DIAGNOSIS — Z29.9 ENCOUNTER FOR PROPHYLACTIC MEASURES, UNSPECIFIED: ICD-10-CM

## 2025-05-26 LAB
ALBUMIN SERPL ELPH-MCNC: 3.4 G/DL — SIGNIFICANT CHANGE UP (ref 3.3–5)
ALP SERPL-CCNC: 42 U/L — SIGNIFICANT CHANGE UP (ref 40–120)
ALT FLD-CCNC: 14 U/L — SIGNIFICANT CHANGE UP (ref 12–78)
ANION GAP SERPL CALC-SCNC: 4 MMOL/L — LOW (ref 5–17)
ANISOCYTOSIS BLD QL: SLIGHT — SIGNIFICANT CHANGE UP
AST SERPL-CCNC: 7 U/L — LOW (ref 15–37)
BASO STIPL BLD QL SMEAR: PRESENT — SIGNIFICANT CHANGE UP
BASOPHILS # BLD AUTO: 0 K/UL — SIGNIFICANT CHANGE UP (ref 0–0.2)
BASOPHILS NFR BLD AUTO: 0 % — SIGNIFICANT CHANGE UP (ref 0–2)
BILIRUB SERPL-MCNC: 0.6 MG/DL — SIGNIFICANT CHANGE UP (ref 0.2–1.2)
BUN SERPL-MCNC: 26 MG/DL — HIGH (ref 7–23)
CALCIUM SERPL-MCNC: 8.6 MG/DL — SIGNIFICANT CHANGE UP (ref 8.5–10.1)
CHLORIDE SERPL-SCNC: 104 MMOL/L — SIGNIFICANT CHANGE UP (ref 96–108)
CO2 SERPL-SCNC: 32 MMOL/L — HIGH (ref 22–31)
CREAT SERPL-MCNC: 1.2 MG/DL — SIGNIFICANT CHANGE UP (ref 0.5–1.3)
D DIMER BLD IA.RAPID-MCNC: <150 NG/ML DDU — SIGNIFICANT CHANGE UP
EGFR: 45 ML/MIN/1.73M2 — LOW
EGFR: 45 ML/MIN/1.73M2 — LOW
EOSINOPHIL # BLD AUTO: 0 K/UL — SIGNIFICANT CHANGE UP (ref 0–0.5)
EOSINOPHIL NFR BLD AUTO: 0 % — SIGNIFICANT CHANGE UP (ref 0–6)
GLUCOSE SERPL-MCNC: 151 MG/DL — HIGH (ref 70–99)
HCT VFR BLD CALC: 28.9 % — LOW (ref 34.5–45)
HGB BLD-MCNC: 8.9 G/DL — LOW (ref 11.5–15.5)
LYMPHOCYTES # BLD AUTO: 0.74 K/UL — LOW (ref 1–3.3)
LYMPHOCYTES # BLD AUTO: 24 % — SIGNIFICANT CHANGE UP (ref 13–44)
MACROCYTES BLD QL: SLIGHT — SIGNIFICANT CHANGE UP
MANUAL SMEAR VERIFICATION: SIGNIFICANT CHANGE UP
MCHC RBC-ENTMCNC: 30.8 G/DL — LOW (ref 32–36)
MCHC RBC-ENTMCNC: 31.4 PG — SIGNIFICANT CHANGE UP (ref 27–34)
MCV RBC AUTO: 102.1 FL — HIGH (ref 80–100)
METAMYELOCYTES # FLD: 1 % — HIGH (ref 0–0)
METAMYELOCYTES NFR BLD: 1 % — HIGH (ref 0–0)
MONOCYTES # BLD AUTO: 0.65 K/UL — SIGNIFICANT CHANGE UP (ref 0–0.9)
MONOCYTES NFR BLD AUTO: 21 % — HIGH (ref 2–14)
MYELOCYTES NFR BLD: 1 % — HIGH (ref 0–0)
NEUTROPHILS # BLD AUTO: 1.54 K/UL — LOW (ref 1.8–7.4)
NEUTROPHILS NFR BLD AUTO: 45 % — SIGNIFICANT CHANGE UP (ref 43–77)
NEUTS BAND # BLD: 5 % — SIGNIFICANT CHANGE UP (ref 0–8)
NEUTS BAND NFR BLD: 5 % — SIGNIFICANT CHANGE UP (ref 0–8)
NRBC # BLD: 4 /100 WBCS — HIGH (ref 0–0)
NRBC BLD AUTO-RTO: SIGNIFICANT CHANGE UP /100 WBCS (ref 0–0)
NRBC BLD-RTO: 4 /100 WBCS — HIGH (ref 0–0)
NT-PROBNP SERPL-SCNC: 2185 PG/ML — HIGH (ref 0–450)
PLAT MORPH BLD: NORMAL — SIGNIFICANT CHANGE UP
PLATELET # BLD AUTO: 156 K/UL — SIGNIFICANT CHANGE UP (ref 150–400)
POIKILOCYTOSIS BLD QL AUTO: SLIGHT — SIGNIFICANT CHANGE UP
POTASSIUM SERPL-MCNC: 4.7 MMOL/L — SIGNIFICANT CHANGE UP (ref 3.5–5.3)
POTASSIUM SERPL-SCNC: 4.7 MMOL/L — SIGNIFICANT CHANGE UP (ref 3.5–5.3)
PROT SERPL-MCNC: 6.3 G/DL — SIGNIFICANT CHANGE UP (ref 6–8.3)
RAPID RVP RESULT: SIGNIFICANT CHANGE UP
RBC # BLD: 2.83 M/UL — LOW (ref 3.8–5.2)
RBC # FLD: 25.1 % — HIGH (ref 10.3–14.5)
RBC BLD AUTO: ABNORMAL
SARS-COV-2 RNA SPEC QL NAA+PROBE: SIGNIFICANT CHANGE UP
SCHISTOCYTES BLD QL AUTO: SLIGHT — SIGNIFICANT CHANGE UP
SODIUM SERPL-SCNC: 140 MMOL/L — SIGNIFICANT CHANGE UP (ref 135–145)
TOXIC GRANULES BLD QL SMEAR: PRESENT — SIGNIFICANT CHANGE UP
TROPONIN I, HIGH SENSITIVITY RESULT: 15.2 NG/L — SIGNIFICANT CHANGE UP
VARIANT LYMPHS # BLD: 3 % — SIGNIFICANT CHANGE UP (ref 0–6)
VARIANT LYMPHS NFR BLD MANUAL: 3 % — SIGNIFICANT CHANGE UP (ref 0–6)
WBC # BLD: 3.08 K/UL — LOW (ref 3.8–10.5)
WBC # FLD AUTO: 3.08 K/UL — LOW (ref 3.8–10.5)

## 2025-05-26 PROCEDURE — 93010 ELECTROCARDIOGRAM REPORT: CPT

## 2025-05-26 PROCEDURE — 71045 X-RAY EXAM CHEST 1 VIEW: CPT | Mod: 26

## 2025-05-26 PROCEDURE — 93970 EXTREMITY STUDY: CPT | Mod: 26

## 2025-05-26 PROCEDURE — 99285 EMERGENCY DEPT VISIT HI MDM: CPT

## 2025-05-26 PROCEDURE — 99223 1ST HOSP IP/OBS HIGH 75: CPT | Mod: GC

## 2025-05-26 RX ORDER — METHYLPREDNISOLONE ACETATE 80 MG/ML
125 INJECTION, SUSPENSION INTRA-ARTICULAR; INTRALESIONAL; INTRAMUSCULAR; SOFT TISSUE ONCE
Refills: 0 | Status: COMPLETED | OUTPATIENT
Start: 2025-05-26 | End: 2025-05-26

## 2025-05-26 RX ORDER — FUROSEMIDE 10 MG/ML
20 INJECTION INTRAMUSCULAR; INTRAVENOUS ONCE
Refills: 0 | Status: COMPLETED | OUTPATIENT
Start: 2025-05-26 | End: 2025-05-26

## 2025-05-26 RX ORDER — PREDNISONE 20 MG/1
50 TABLET ORAL DAILY
Refills: 0 | Status: DISCONTINUED | OUTPATIENT
Start: 2025-05-27 | End: 2025-05-29

## 2025-05-26 RX ORDER — PREDNISONE 20 MG/1
5 TABLET ORAL DAILY
Refills: 0 | Status: DISCONTINUED | OUTPATIENT
Start: 2025-05-26 | End: 2025-05-26

## 2025-05-26 RX ORDER — ATORVASTATIN CALCIUM 80 MG/1
10 TABLET, FILM COATED ORAL AT BEDTIME
Refills: 0 | Status: DISCONTINUED | OUTPATIENT
Start: 2025-05-26 | End: 2025-06-05

## 2025-05-26 RX ORDER — MIDODRINE HYDROCHLORIDE 5 MG/1
10 TABLET ORAL
Refills: 0 | Status: DISCONTINUED | OUTPATIENT
Start: 2025-05-26 | End: 2025-06-03

## 2025-05-26 RX ORDER — IPRATROPIUM BROMIDE AND ALBUTEROL SULFATE .5; 2.5 MG/3ML; MG/3ML
3 SOLUTION RESPIRATORY (INHALATION) ONCE
Refills: 0 | Status: COMPLETED | OUTPATIENT
Start: 2025-05-26 | End: 2025-05-26

## 2025-05-26 RX ORDER — GABAPENTIN 400 MG/1
400 CAPSULE ORAL
Refills: 0 | Status: DISCONTINUED | OUTPATIENT
Start: 2025-05-26 | End: 2025-06-05

## 2025-05-26 RX ORDER — IPRATROPIUM BROMIDE AND ALBUTEROL SULFATE .5; 2.5 MG/3ML; MG/3ML
3 SOLUTION RESPIRATORY (INHALATION) EVERY 6 HOURS
Refills: 0 | Status: DISCONTINUED | OUTPATIENT
Start: 2025-05-26 | End: 2025-06-05

## 2025-05-26 RX ORDER — TIZANIDINE 4 MG/1
2 TABLET ORAL AT BEDTIME
Refills: 0 | Status: DISCONTINUED | OUTPATIENT
Start: 2025-05-26 | End: 2025-06-05

## 2025-05-26 RX ORDER — FUROSEMIDE 10 MG/ML
40 INJECTION INTRAMUSCULAR; INTRAVENOUS
Refills: 0 | Status: DISCONTINUED | OUTPATIENT
Start: 2025-05-27 | End: 2025-05-28

## 2025-05-26 RX ORDER — APIXABAN 2.5 MG/1
2.5 TABLET, FILM COATED ORAL EVERY 12 HOURS
Refills: 0 | Status: DISCONTINUED | OUTPATIENT
Start: 2025-05-26 | End: 2025-06-05

## 2025-05-26 RX ORDER — AMIODARONE HYDROCHLORIDE 50 MG/ML
100 INJECTION, SOLUTION INTRAVENOUS DAILY
Refills: 0 | Status: DISCONTINUED | OUTPATIENT
Start: 2025-05-26 | End: 2025-06-05

## 2025-05-26 RX ORDER — MELATONIN 5 MG
3 TABLET ORAL AT BEDTIME
Refills: 0 | Status: DISCONTINUED | OUTPATIENT
Start: 2025-05-26 | End: 2025-06-05

## 2025-05-26 RX ADMIN — METHYLPREDNISOLONE ACETATE 125 MILLIGRAM(S): 80 INJECTION, SUSPENSION INTRA-ARTICULAR; INTRALESIONAL; INTRAMUSCULAR; SOFT TISSUE at 21:04

## 2025-05-26 RX ADMIN — IPRATROPIUM BROMIDE AND ALBUTEROL SULFATE 3 MILLILITER(S): .5; 2.5 SOLUTION RESPIRATORY (INHALATION) at 21:04

## 2025-05-26 RX ADMIN — FUROSEMIDE 20 MILLIGRAM(S): 10 INJECTION INTRAMUSCULAR; INTRAVENOUS at 21:04

## 2025-05-26 NOTE — H&P ADULT - PROBLEM SELECTOR PLAN 6
Continue home midodrine 10/10/5 daily with hold parameters Chronic aplastic anemia, baseline Hb, gets frequent infusions and transfusions  No signs of bleeding

## 2025-05-26 NOTE — H&P ADULT - NSHPREVIEWOFSYSTEMS_GEN_ALL_CORE
CONSTITUTIONAL: denies fever, chills, fatigue, weakness  HEENT: denies blurred vision, sore throat  SKIN: denies new lesions, rash  CARDIOVASCULAR: denies chest pain, chest pressure, palpitations  RESPIRATORY: denies shortness of breath, cough, sputum production  GASTROINTESTINAL: denies nausea, vomiting, diarrhea, abdominal pain, melena or hematochezia  GENITOURINARY: denies dysuria, discharge  NEUROLOGICAL: denies numbness, headache, focal weakness  MUSCULOSKELETAL: denies new joint pain, muscle aches  HEMATOLOGIC: denies gross bleeding, bruising CONSTITUTIONAL: denies fever, chills, fatigue, weakness  HEENT: denies blurred vision, sore throat  SKIN: denies new lesions, rash  CARDIOVASCULAR: denies chest pain, chest pressure, palpitations, orthopnea  RESPIRATORY: denies cough, sputum production  GASTROINTESTINAL: denies nausea, vomiting, diarrhea, abdominal pain, melena or hematochezia  GENITOURINARY: denies dysuria, discharge  NEUROLOGICAL: denies numbness, headache, focal weakness  MUSCULOSKELETAL: denies new joint pain, muscle aches

## 2025-05-26 NOTE — ED ADULT NURSE NOTE - NS ED NOTE ABUSE SUSPICION NEGLECT YN
No Follow up with your PMD in 1-2 days for re-evaluation, ongoing care and treatment. Return to ED for any related or worsening symptoms/concerns.

## 2025-05-26 NOTE — ED PROVIDER NOTE - OBJECTIVE STATEMENT
c/o shortness of breath- was 87% on room air- patient was wheezing- patient given 1 albuterol treatment by ems- on 4 litres patient saturating at 96%  shortness of breath c/o shortness of breath- was 87% on room air- patient was wheezing- patient given 1 albuterol treatment by ems- on 4 litres patient saturating at 96%  shortness of breath  Girish Rodas Gordon  COPD, A Fib  SOB, leg edema, started Saturday night sweats, sob, no CP c/o shortness of breath- was 87% on room air- patient was wheezing- patient given 1 albuterol treatment by ems- on 4 litres patient saturating at 96%  shortness of breath  Girish Rodas Gordon  COPD, A Fib, CHF   SOB, leg edema, started Saturday, she experienced sweats, sob, no CP 83 y/o female H/o COPD, A Fib, CHF c/o shortness of breath, oxygen saturation  was 87% on room air- patient was wheezing, increased peripheral edema, she was given 1 albuterol treatment by ems- on 4 litres patient saturating at 96%  no headache, no chest pain, no Syncope, no palpitations, no fever, no chills no toxemia, no abdominal pain,  no neuro changes.  Primary care KIAN Schreiber R. Gordon    SOB, leg edema, started Saturday, she experienced sweats, sob, no CP

## 2025-05-26 NOTE — H&P ADULT - PROBLEM SELECTOR PLAN 5
Chronic macrocytic anemia  No signs of bleeding  Will check B12/ folate Takes prednisone 5mg daily, chronically, will continue  Continue home leflunomide (10mg), pharmacy only carries 20mg and cannot split tabs. Patient instructed to bring in home medication to bring to pharmacy to dispense. Primary team to confirm patient receiving this medication. Takes prednisone 5mg daily, chronically, will continue increase to 50mg qd x 5 days for potential COPD exacerbation. Return back to home 5mg qd when increased dose no longer clinically warranted or completed course.     Continue home leflunomide (10mg), pharmacy only carries 20mg and cannot split tabs. Patient instructed to bring in home medication to bring to pharmacy to dispense. Primary team to confirm patient receiving this medication.

## 2025-05-26 NOTE — ED ADULT TRIAGE NOTE - CHIEF COMPLAINT QUOTE
c/o shortness of breath- was 87% on room air- patient was wheezing- poatient given 1 albuterol treatment by ems- on 4 litres patient saturating at 96%

## 2025-05-26 NOTE — ED PROVIDER NOTE - NSICDXFAMILYHX_GEN_ALL_CORE_FT
FAMILY HISTORY:  Father  Still living? No  Family history of myocardial infarction, Age at diagnosis: Age Unknown  FH: atrial fibrillation, Age at diagnosis: Age Unknown    Mother  Still living? Unknown  Family history of bladder cancer, Age at diagnosis: Age Unknown

## 2025-05-26 NOTE — ED PROVIDER NOTE - CONSTITUTIONAL, MLM
Well appearing, awake, alert, oriented to person, place, time/situation and mild to moderate distress. normal...

## 2025-05-26 NOTE — ED ADULT NURSE NOTE - OBJECTIVE STATEMENT
pt states that she has been having SOB for the past two days. pt noted to have BLLE swelling. pt denies fever, chills, cough, and CP. pt in no acute distress. pt updated on plan of care.

## 2025-05-26 NOTE — CONSULT NOTE ADULT - SUBJECTIVE AND OBJECTIVE BOX
[facility] - Sai Valle, Girish Melgoza, El, David López  Office Number: [phone]    Initial Consult Note    CHIEF COMPLAINT: Patient is a 84y old  Female who presents with a chief complaint of SOB    HPI:  Pt is a 83 y/o F with PMH AF on Eliquis, , CKD, aplastic anemia, HTN, HLD, DM who p/w worsening SOB over the past few days with b/l LE edema.  SOB worsens with exertion.  She denies CP/palpitations/syncope/fevers.  She has been compliant with her medications.        PAST MEDICAL & SURGICAL HISTORY:  COPD (chronic obstructive pulmonary disease)      DM (diabetes mellitus)  diet-controlled      PAF (paroxysmal atrial fibrillation)  s/p ablation      Hiatal hernia      H/O aplastic anemia      History of IBS      H/O osteoporosis      HLD (hyperlipidemia)      PMR (polymyalgia rheumatica)      History of basal cell cancer      Chronic kidney disease (CKD)      Hypotension      Presence of IVC filter      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      History of immunodeficiency      Lumbar compression fracture      H/O hiatal hernia      H/O pulmonary fibrosis      H/O sinus bradycardia      History of fracture of right hip  "unoperable"      S/P hysterectomy      S/P foot surgery      S/P knee surgery      After cataract  bilateral eye cataract surgically removed      Closed right hip fracture  rigth hip ORIF july 19 2016      S/P IVC filter  nov 2016      S/P carpal tunnel release  Right 2008 & 6/27/17      H/O kyphoplasty      Port-A-Cath in place  right chest          SOCIAL HISTORY:  No tobacco, ethanol, or drug abuse.    FAMILY HISTORY:  Family history of bladder cancer (Mother)    Family history of myocardial infarction (Father)    FH: atrial fibrillation (Father)      No family history of acute MI or sudden cardiac death.    MEDICATIONS  (STANDING):    MEDICATIONS  (PRN):      Allergies    No Known Allergies    Intolerances        REVIEW OF SYSTEMS:    CONSTITUTIONAL: No weakness, fevers or chills  EYES/ENT: No visual changes;  No vertigo or throat pain   NECK: No pain or stiffness  RESPIRATORY: No cough, wheezing, hemoptysis; No shortness of breath  CARDIOVASCULAR: No chest pain or palpitations  GASTROINTESTINAL: No abdominal pain. No nausea, vomiting, or hematemesis; No diarrhea or constipation. No melena or hematochezia.  GENITOURINARY: No dysuria, frequency or hematuria  NEUROLOGICAL: No numbness or weakness  SKIN: No itching or rash  All other review of systems is negative unless indicated above    VITAL SIGNS:   Vital Signs Last 24 Hrs  T(C): 36.7 (26 May 2025 17:13), Max: 36.7 (26 May 2025 17:13)  T(F): 98 (26 May 2025 17:13), Max: 98 (26 May 2025 17:13)  HR: 77 (26 May 2025 17:13) (77 - 77)  BP: 130/77 (26 May 2025 17:13) (130/77 - 130/77)  BP(mean): --  RR: 26 (26 May 2025 17:13) (26 - 26)  SpO2: 96% (26 May 2025 17:13) (96% - 96%)    Parameters below as of 26 May 2025 17:13  Patient On (Oxygen Delivery Method): nasal cannula  O2 Flow (L/min): 4      I&O's Summary      On Exam:    Constitutional: NAD, alert and oriented x 3  Lungs:  Non-labored, breath sounds are clear bilaterally, No wheezing, rales or rhonchi  Cardiovascular: RRR.  S1 and S2 positive.  No murmurs, rubs, gallops or clicks  Gastrointestinal: Bowel Sounds present, soft, nontender.   Lymph: No peripheral edema. No cervical lymphadenopathy.  Neurological: Alert, no focal deficits  Skin: No rashes or ulcers   Psych:  Mood & affect appropriate.    LABS: All Labs Reviewed:                        8.9    3.08  )-----------( 156      ( 26 May 2025 18:00 )             28.9                 Blood Culture:            EXAM:  ECHO TTE W/O CON COMP W/DOPPLR           PROCEDURE DATE:  07/17/2016      INTERPRETATION:  Ordering Physician: LIA MOLINA    Indication: Syncope    Study Quality: Technically difficult and limited     Height: 167 cm  Weight: 91 kg  BSA: 2.0 sq m  Blood Pressure: 104/70    MEASUREMENTS  IVS: 1.4cm  PWT: 1.4cm  LA: 4.4cm  AO: 3.6cm  LVIDd: 4.6cm  LVIDs: 2.4cm    FINDINGS  Left Ventricle: The endocardium is not well-visualized. Grossly, there   appears to be normal left ventricular systolic function.  Aortic Valve: Calcified trileaflet aortic valve with normal opening. No   significant aortic insufficiency.  Mitral Valve: Mitral annular calcification and calcified mitral valve   leaflets. Mild mitral insufficiency.  Tricuspid Valve: Not well visualized. Grossly normal. Mild tricuspid   insufficiency.  Pulmonic Valve: Not well visualized. Mild pulmonic insufficiency.  Left Atrium: Enlarged  Right Ventricle: Normal right ventricular size and systolic function.  Right Atrium: Enlarged  Diastolic function: Grade 1 diastolic dysfunction.                   DAVID MELGOZA M.D., ATTENDING CARDIOLOGIST  This document has been electronically signed. Jul 18 2016  1:12P                         [facility] - Sai Valle, Manuel, Girish, El, David López  Office Number: [phone]    Initial Consult Note    CHIEF COMPLAINT: Patient is a 84y old  Female who presents with a chief complaint of SOB    HPI:  Pt is a 85 y/o F with PMH AF on Eliquis, , CKD, aplastic anemia, HTN, HLD, DM who p/w worsening SOB over the past few days with b/l LE edema.  SOB worsens with exertion.  She denies CP/palpitations/syncope/fevers.  She has been compliant with her medications.    ECG shows NSR PACs, no ST-T wave abnormalities      PAST MEDICAL & SURGICAL HISTORY:  COPD (chronic obstructive pulmonary disease)      DM (diabetes mellitus)  diet-controlled      PAF (paroxysmal atrial fibrillation)  s/p ablation      Hiatal hernia      H/O aplastic anemia      History of IBS      H/O osteoporosis      HLD (hyperlipidemia)      PMR (polymyalgia rheumatica)      History of basal cell cancer      Chronic kidney disease (CKD)      Hypotension      Presence of IVC filter      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      History of immunodeficiency      Lumbar compression fracture      H/O hiatal hernia      H/O pulmonary fibrosis      H/O sinus bradycardia      History of fracture of right hip  "unoperable"      S/P hysterectomy      S/P foot surgery      S/P knee surgery      After cataract  bilateral eye cataract surgically removed      Closed right hip fracture  rigth hip ORIF july 19 2016      S/P IVC filter  nov 2016      S/P carpal tunnel release  Right 2008 & 6/27/17      H/O kyphoplasty      Port-A-Cath in place  right chest          SOCIAL HISTORY:  No tobacco, ethanol, or drug abuse.    FAMILY HISTORY:  Family history of bladder cancer (Mother)    Family history of myocardial infarction (Father)    FH: atrial fibrillation (Father)      No family history of acute MI or sudden cardiac death.    MEDICATIONS  (STANDING):    MEDICATIONS  (PRN):      Allergies    No Known Allergies    Intolerances        REVIEW OF SYSTEMS:    CONSTITUTIONAL: No weakness, fevers or chills  EYES/ENT: No visual changes;  No vertigo or throat pain   NECK: No pain or stiffness  RESPIRATORY: No cough, wheezing, hemoptysis; No shortness of breath  CARDIOVASCULAR: No chest pain or palpitations  GASTROINTESTINAL: No abdominal pain. No nausea, vomiting, or hematemesis; No diarrhea or constipation. No melena or hematochezia.  GENITOURINARY: No dysuria, frequency or hematuria  NEUROLOGICAL: No numbness or weakness  SKIN: No itching or rash  All other review of systems is negative unless indicated above    VITAL SIGNS:   Vital Signs Last 24 Hrs  T(C): 36.7 (26 May 2025 17:13), Max: 36.7 (26 May 2025 17:13)  T(F): 98 (26 May 2025 17:13), Max: 98 (26 May 2025 17:13)  HR: 77 (26 May 2025 17:13) (77 - 77)  BP: 130/77 (26 May 2025 17:13) (130/77 - 130/77)  BP(mean): --  RR: 26 (26 May 2025 17:13) (26 - 26)  SpO2: 96% (26 May 2025 17:13) (96% - 96%)    Parameters below as of 26 May 2025 17:13  Patient On (Oxygen Delivery Method): nasal cannula  O2 Flow (L/min): 4      I&O's Summary      On Exam:    Constitutional: NAD, alert and oriented x 3  Lungs:  Non-labored, breath sounds are clear bilaterally, No wheezing, rales or rhonchi  Cardiovascular: RRR.  S1 and S2 positive.  No murmurs, rubs, gallops or clicks  Gastrointestinal: Bowel Sounds present, soft, nontender.   Lymph: No peripheral edema. No cervical lymphadenopathy.  Neurological: Alert, no focal deficits  Skin: No rashes or ulcers   Psych:  Mood & affect appropriate.    LABS: All Labs Reviewed:                        8.9    3.08  )-----------( 156      ( 26 May 2025 18:00 )             28.9                 Blood Culture:            EXAM:  ECHO TTE W/O CON COMP W/DOPPLR           PROCEDURE DATE:  07/17/2016      INTERPRETATION:  Ordering Physician: LIA MOLINA    Indication: Syncope    Study Quality: Technically difficult and limited     Height: 167 cm  Weight: 91 kg  BSA: 2.0 sq m  Blood Pressure: 104/70    MEASUREMENTS  IVS: 1.4cm  PWT: 1.4cm  LA: 4.4cm  AO: 3.6cm  LVIDd: 4.6cm  LVIDs: 2.4cm    FINDINGS  Left Ventricle: The endocardium is not well-visualized. Grossly, there   appears to be normal left ventricular systolic function.  Aortic Valve: Calcified trileaflet aortic valve with normal opening. No   significant aortic insufficiency.  Mitral Valve: Mitral annular calcification and calcified mitral valve   leaflets. Mild mitral insufficiency.  Tricuspid Valve: Not well visualized. Grossly normal. Mild tricuspid   insufficiency.  Pulmonic Valve: Not well visualized. Mild pulmonic insufficiency.  Left Atrium: Enlarged  Right Ventricle: Normal right ventricular size and systolic function.  Right Atrium: Enlarged  Diastolic function: Grade 1 diastolic dysfunction.                   DAVID MELGOZA M.D., ATTENDING CARDIOLOGIST  This document has been electronically signed. Jul 18 2016  1:12P

## 2025-05-26 NOTE — H&P ADULT - PROBLEM SELECTOR PLAN 1
DDx includes CHF exacerbation/ COPD exacerbation, d-dimer negative, CXR without evidence of airspace consolidation, likely element of interstitial edema, BNP elevated  Recent TTE (4/8/2025): EF 63%, LV hypertrophy, intermediate diastolic dysfunction, mild pHTN  CT chest w/o IV contrast for better characterization of source of hypoxia    -Strict I/Os, daily weights  Fluid restriction  PT evaluation  Continuous pulse ox  Admit to tele  Lasix 40mg IV BID per cardiology recs  For potential COPD exacerbation continue duonebs q6h prn, solumedrol 40mg IV BID, spiriva-advair DDx includes CHF exacerbation/ COPD exacerbation, d-dimer negative, CXR without evidence of airspace consolidation, likely element of interstitial edema, BNP elevated  Recent TTE (4/8/2025): EF 63%, LV hypertrophy, intermediate diastolic dysfunction, mild pHTN  CT chest w/o IV contrast for better characterization of source of hypoxia    -Strict I/Os, daily weights  Fluid restriction 2L, avoid IVF   PT evaluation  Continuous pulse ox  Admit to tele  Lasix 40mg IV BID per cardiology recs  For potential COPD exacerbation continue duonebs q6h prn, solumedrol 40mg IV BID, spiriva-advair DDx includes CHF exacerbation +/- COPD exacerbation aspect. D-dimer negative, CXR without evidence of airspace consolidation, likely element of interstitial edema, BNP elevated  LE doppler negative for DVT  Recent TTE (4/8/2025): EF 63%, LV hypertrophy, intermediate diastolic dysfunction, mild pHTN, will not repeat currently    CT chest w/o IV contrast ordered for better characterization of source of hypoxia  Strict I/Os, daily weights  Fluid restriction 2L, avoid IVF   PT evaluation  Continuous pulse ox, wean O2 as tolerated to obtain SpO2 88-92% goal.  Admit to tele  Lasix 40mg IV BID per cardiology recs  For potential COPD exacerbation continue duonebs q6h prn, solumedrol 40mg IV BID, pulmonology (Dr. Gallegos) DDx includes CHF exacerbation +/- COPD exacerbation aspect. D-dimer negative, CXR without evidence of airspace consolidation, likely element of interstitial edema, BNP elevated  LE doppler negative for DVT  Recent TTE (4/8/2025): EF 63%, LV hypertrophy, intermediate diastolic dysfunction, mild pHTN, will not repeat currently    CT chest w/o IV contrast ordered for better characterization of source of hypoxia  Strict I/Os, daily weights  Fluid restriction 2L, avoid IVF   PT evaluation  Continuous pulse ox, wean O2 as tolerated to obtain SpO2 88-92% goal.  Admit to tele  Lasix 40mg IV BID per cardiology recs  For potential COPD exacerbation continue duonebs q6h prn, increase home prednisone 5mg qd to 50mg qd x 5 days. pulmonology (Dr. Gallegos) DDx includes CHF exacerbation +/- COPD exacerbation aspect. D-dimer negative, CXR without evidence of airspace consolidation, likely element of interstitial edema, BNP elevated  LE doppler negative for DVT  Recent TTE (4/8/2025): EF 63%, LV hypertrophy, intermediate diastolic dysfunction, mild pHTN, will not repeat currently    CT chest w/o IV contrast ordered for better characterization of source of hypoxia  Strict I/Os, daily weights  Fluid restriction 2L, avoid IVF   PT evaluation  Continuous pulse ox, wean O2 as tolerated to obtain SpO2 88-92% goal.  Admit to tele  Lasix 40mg IV BID per cardiology recs  For potential COPD exacerbation continue duonebs q6h prn, increase home prednisone 5mg qd to 50mg qd x 5 days. pulmonology (Dr. Gallegos) consulted

## 2025-05-26 NOTE — ED ADULT TRIAGE NOTE - INTERNATIONAL TRAVEL
Joanna Perez Patient Age: 73 year old  MESSAGE: Interpreting service used: No    Insurance on file confirmed with caller: Yes    IM/FP- Results- Previously Discussed Results     Caller requesting to discuss results that have already been reviewed.    Type of test: urine     Date of test: 1/15    Additional information: patient has further questions regarding her urine. States she also got a urine test back that she had out of town and has questions on it.     Provider's home site- [place]- Connect call to [facility] queue- Route message to provider's clinical support pool    Message read back to caller for accuracy: Yes       ALLERGIES:  Codeine  Current Outpatient Medications   Medication Sig Dispense Refill   • irbesartan-hydrochlorothiazide (AVALIDE) 300-12.5 MG per tablet TAKE 1 TABLET BY MOUTH DAILY 90 tablet 0   • albuterol 108 (90 Base) MCG/ACT inhaler Inhale 2 puffs into the lungs every 4 hours as needed for Shortness of Breath or Wheezing. 1 each 0   • levothyroxine 112 MCG tablet TAKE 1 TABLET BY MOUTH DAILY 90 tablet 3   • Omeprazole Magnesium 20.6 (20 Base) MG CAPSULE DELAYED RELEASE        No current facility-administered medications for this visit.     PHARMACY to use: below          Pharmacy preference(s) on file:   [id number] - [place], [place] - [address] AT [facility] [address] [place] [address] [place] [address]  [address] [facility] [address]  Phone: [phone] Fax: [phone]      CALL BACK INFO: Ok to leave response (including medical information) with family member or on answering machine      PCP: Mik Avilez MD         INS: Payor: MEDICARE / Plan: PARTA AND B / Product Type: MEDICARE   PATIENT ADDRESS:  [address] [place] [id number]       No

## 2025-05-26 NOTE — ED ADULT NURSE REASSESSMENT NOTE - NS ED NURSE REASSESS COMMENT FT1
Assumed care of pt in change of shift. Pt is aaox4 and follows command. Pt denies pain/discomfort at this time. No distress noted. Pt placed on continuous cardiac monitoring. Pt placed on prima fit for comfort. Plan of care ongoing.

## 2025-05-26 NOTE — H&P ADULT - NSHPSOCIALHISTORY_GEN_ALL_CORE
Former smoker  No EtOH/ illicit drug use  Ambulates with walker, wheelchair when out of house  Lives with  and son  Independent ADLs

## 2025-05-26 NOTE — CONSULT NOTE ADULT - ASSESSMENT
Pt is a 85 y/o F with PMH AF on Eliquis, , CKD, aplastic anemia, HTN, HLD, DM who p/w worsening SOB over the past few days with b/l LE edema.  SOB worsens with exertion.  She denies CP/palpitations/syncope/fevers.  She has been compliant with her medications.     SOB/LE edema:  likely HF exacerbation, possible COPD component  start lasix 40mg IV bid  strict I&Os and daily weights  check TTE  labs pending    AF:  c/w Eliqius for AC  c/w metoprolol for rate control  c/w amio  admit to tele    hypotension:  pt on midodrine 10/10/5 qd at home    HLD:  c/w home zocor    - Monitor and replete lytes, keep K>4 and Mg >2  - Further cardiac workup will depend on clinical course.   - All other workup per primary team  - Thank you for this consult!  Pt is a 85 y/o F with PMH AF on Eliquis, , CKD, aplastic anemia, HTN, HLD, DM who p/w worsening SOB over the past few days with b/l LE edema.  SOB worsens with exertion.  She denies CP/palpitations/syncope/fevers.  She has been compliant with her medications.     SOB/LE edema:  likely HF exacerbation, possible COPD component  start lasix 40mg IV bid  strict I&Os and daily weights  check TTE  labs pending  ECG shows NSR PACs, no ST-T wave abnormalities    AF:  c/w Eliqius for AC  c/w metoprolol for rate control  c/w amio  admit to tele    hypotension:  pt on midodrine 10/10/5 qd at home    HLD:  c/w home zocor    - Monitor and replete lytes, keep K>4 and Mg >2  - Further cardiac workup will depend on clinical course.   - All other workup per primary team  - Thank you for this consult!

## 2025-05-26 NOTE — H&P ADULT - ATTENDING COMMENTS
83 y/o F with PMH AFon Eliquis, COPD, CKD, aplastic anemia, PMR (chronic prednisone with AVN hip), HLD, HTN, DM who p/w worsening SOB over the past few days admitted for likely CHF exacerbation.     #AHRF in setting of CHF exacerbation and mild COPD exacerbation  DDx includes CHF exacerbation +/- COPD exacerbation aspect. D-dimer negative, CXR without evidence of airspace consolidation, likely element of interstitial edema, BNP elevated  LE doppler negative for DVT  Recent TTE (4/8/2025): EF 63%, LV hypertrophy, intermediate diastolic dysfunction, mild pHTN, will not repeat currently    CT chest w/o IV contrast ordered for better characterization of source of hypoxia  Strict I/Os, daily weights  Fluid restriction 2L, avoid IVF   PT evaluation  Continuous pulse ox, wean O2 as tolerated to obtain SpO2 88-92% goal.  Admit to tele  Lasix 40mg IV BID per cardiology recs  For potential COPD exacerbation continue duonebs q6h prn, increase home prednisone 5mg qd to 50mg qd x 5 days. pulmonology (Dr. Gallegos) consulted    Agree with Resident's Note. Refer to resident's note for chronic comorbidities  76 minutes spent on total encounter excluding teaching and separately reported services. The necessity of the time spent during the encounter on this date of service was due to:   activities including direct patient care, counseling and/or coordinating care, and reviewing notes/lab data/imaging 85 y/o F with PMH AFon Eliquis, COPD, CKD, aplastic anemia, PMR (chronic prednisone with AVN hip), HLD, HTN, DM who p/w worsening SOB over the past few days admitted for likely CHF exacerbation.     #AHRF in setting of CHF exacerbation and mild COPD exacerbation  DDx includes CHF exacerbation +/- COPD exacerbation aspect. D-dimer negative, CXR without evidence of airspace consolidation, likely element of interstitial edema, BNP elevated  LE doppler negative for DVT  Recent TTE (4/8/2025): EF 63%, LV hypertrophy, intermediate diastolic dysfunction, mild pHTN, will not repeat currently    CT chest: Small bilateral pleural effusions and mild bibasilar compressive and dependent atelectasis. Scattered tree-in bud opacities in the right upper lobe, right middle lobe, and right lower lobe and to a lesser degree in the lingula, some of which are chronic. Mosaic attenuation.  will monitor off abx for time being-> given likely chronic findings  monitor temperature curve and wbc count  procalcitonin ordered in AM, if elevated will start abx.  Strict I/Os, daily weights  Fluid restriction 2L, avoid IVF   PT evaluation  Continuous pulse ox, wean O2 as tolerated to obtain SpO2 88-92% goal.  Admit to tele  Lasix 40mg IV BID per cardiology recs  For potential COPD exacerbation continue duonebs q6h prn, increase home prednisone 5mg qd to 50mg qd x 5 days. pulmonology (Dr. Gallegos) consulted    Agree with Resident's Note. Refer to resident's note for chronic comorbidities  76 minutes spent on total encounter excluding teaching and separately reported services. The necessity of the time spent during the encounter on this date of service was due to:   activities including direct patient care, counseling and/or coordinating care, and reviewing notes/lab data/imaging

## 2025-05-26 NOTE — H&P ADULT - NSHPPHYSICALEXAM_GEN_ALL_CORE
PHYSICAL EXAM:  GENERAL: NAD, lying in bed comfortably  HEAD:  Atraumatic, Normocephalic  EYES: EOMI, PERRLA, conjunctiva and sclera clear  ENT: Moist mucous membranes  NECK: Supple, No JVD  CHEST/LUNG: Clear to auscultation bilaterally; No rales, rhonchi, wheezing, or rubs. Unlabored respirations  HEART: Regular rate and rhythm; No murmurs, rubs, or gallops  ABDOMEN: Bowel sounds present; Soft, Nontender, Nondistended   EXTREMITIES:  2+ Peripheral Pulses, brisk capillary refill. No clubbing, cyanosis, or edema  NERVOUS SYSTEM:  Alert & Oriented X3, speech clear. No deficits   MSK: FROM all 4 extremities, full and equal strength  SKIN: No rashes or lesions PHYSICAL EXAM:  GENERAL: NAD, lying in bed comfortably  HEAD:  Atraumatic, Normocephalic  EYES: EOMI, PERRLA, conjunctiva and sclera clear  ENT: Moist mucous membranes  NECK: Supple, No JVD  CHEST/LUNG: Trace expiratory wheezing, fine crackles lower lung fields. Unlabored respirations, on 5L NC  HEART: Regularly irregular rhythm, normal rate; No murmurs, rubs, or gallops  ABDOMEN: Bowel sounds present; Soft, Nontender, Nondistended   EXTREMITIES:  2+ Peripheral Pulses, brisk capillary refill. 3+ pitting edema b/l LE extending up to knee  NERVOUS SYSTEM:  Alert & Oriented X4, speech clear. No deficits   MSK: FROM all 4 extremities, full and equal strength  SKIN: No rashes or lesions

## 2025-05-26 NOTE — H&P ADULT - PROBLEM SELECTOR PLAN 4
Continue home eliquis, metoprolol and amiodarone Continue home eliquis 2.5 mg BID and amiodarone 100mg qd

## 2025-05-26 NOTE — H&P ADULT - PROBLEM SELECTOR PLAN 3
Hold home medications  Consistent carb diet  Insulin sliding scale  Continue home gabapentin Not on home medications  Consistent carb diet  Insulin sliding scale  Continue home gabapentin

## 2025-05-26 NOTE — ED PROVIDER NOTE - ASSOCIATED SYMPTOMS
shortness of breath, oxygen saturation  was 87% on room air- patient was wheezing, increased peripheral edema,

## 2025-05-26 NOTE — ED PROVIDER NOTE - CLINICAL SUMMARY MEDICAL DECISION MAKING FREE TEXT BOX
85 y/o female H/o COPD, A Fib, CHF c/o shortness of breath, oxygen saturation  was 87% on room air- patient was wheezing, increased peripheral edema, she was given 1 albuterol treatment by ems- on 4 litres patient saturating at 96%  no headache, no chest pain, no Syncope, no palpitations, no fever, no chills no toxemia, no abdominal pain,  no neuro changes  VSS hypoxemia on room air, tachypnea, wheezing and peripheral edema, Rx DUO, solumedrol, Lasix, admitted to [facility] for excerbation of COPD

## 2025-05-26 NOTE — H&P ADULT - ASSESSMENT
85 y/o F with PMH AFon Eliquis, COPD, CKD, aplastic anemia, PMR (chronic prednisone with AVN hip), HLD, HTN, DM who p/w worsening SOB over the past few days 85 y/o F with PMH AFon Eliquis, COPD, CKD, aplastic anemia, PMR (chronic prednisone with AVN hip), HLD, HTN, DM who p/w worsening SOB over the past few days admitted for likely CHF exacerbation.

## 2025-05-26 NOTE — ED PROVIDER NOTE - SIGNIFICANT NEGATIVE FINDINGS
no headache, no chest pain, no Syncope, no palpitations, no fever, no chills no toxemia, no abdominal pain,  no neuro changes

## 2025-05-26 NOTE — H&P ADULT - PROBLEM SELECTOR PLAN 2
Continue home No longer taking trelegy inhaler, stopped 6 mo ago by Dr. Gallegos (pulm) No longer taking trelegy inhaler, stopped 6 mo ago by Dr. Gallegos (pulm)  wean O2 as tolerated to obtain SpO2 88-92% goal.  For potential COPD exacerbation continue duonebs q6h prn, solumedrol 40mg IV BID, pulmonology (Dr. Gallegos) No longer taking trelegy inhaler, stopped 6 mo ago by Dr. Gallegos (pulm)  wean O2 as tolerated to obtain SpO2 88-92% goal.  For potential COPD exacerbation continue duonebs q6h prn, solumedrol 40mg IV BID, pulmonology (Dr. Gallegos) consulted No longer taking trelegy inhaler, stopped 6 mo ago by Dr. Gallegos (pulm)  wean O2 as tolerated to obtain SpO2 88-92% goal.  For potential COPD exacerbation continue duonebs q6h prn, prednisone 50 mg x5 days,  pulmonology (Dr. Gallegos) consulted

## 2025-05-26 NOTE — H&P ADULT - HISTORY OF PRESENT ILLNESS
83 y/o F with PMH AFon Eliquis, COPD, CKD, aplastic anemia, PMR (chronic prednisone with AVN hip), HLD, HTN, DM who p/w worsening SOB over the past few days. Endorses LE edema and SWAIN. Adherent with home meds.    Denies fever, chills, chest pain, palpitations, SOB, cough, abdominal pain, nausea, vomiting, diarrhea, constipation, urinary frequency, urgency, or dysuria, headaches, changes in vision, dizziness, numbness, tingling.  Denies recent travel, recent antibiotic use, or sick contacts.    ED Course:   Vitals: BP: 130/77, HR: 77, Temp: 98, RR: 26, SpO2: 96% on 4L NC  Labs: WBC: 3.08, Hb 8.9 (), HCO3- 32,  RVP negative, BNP 2185, trop WNL    CXR: linear interstitial prominence in b/l bases, trace pleural fluid, potential cardiogenic pulmonary edema, as per personal read, official read pending   B/L LE Dopplers: No evidence of DVT  EKG: NSR PACs  Received in the ED: lasix 20mg IV, solumedrol 125mg IV, duonebs   83 y/o F with PMH AFon Eliquis, COPD (not on home o2), CKD, aplastic anemia, PMR (chronic prednisone with AVN hip), HLD, HTN, DM who p/w worsening SOB over the past two days, denies CP/ palpitations, has home O2 setup (but doesn't use) but put it on after noticing SpO2 in the 80's, which improved SOB. Endorses worsening SWAIN, denies significant wheezing, cough, recent illness med changes, or changes in diet. Adherent with home meds.    Denies fever, chills, chest pain, palpitations, cough, abdominal pain, nausea, vomiting, diarrhea, constipation, urinary frequency, urgency, or dysuria, headaches, changes in vision, dizziness, numbness, tingling.    ED Course:   Vitals: BP: 130/77, HR: 77, Temp: 98, RR: 26, SpO2: 96% on 4L NC  Labs: BNP 2185, trop WNL, WBC: 3.08 (around baseline), Hb 8.9 () (around baseline), HCO3- 32,  RVP negative,     CXR: linear interstitial prominence in b/l bases, trace pleural fluid, potential cardiogenic pulmonary edema, medi-port tip at cavoatrial junction, as per personal read, official read pending   B/L LE Dopplers: No evidence of DVT  EKG: NSR PACs  Received in the ED: lasix 20mg IV, solumedrol 125mg IV, duonebs

## 2025-05-27 LAB
ALBUMIN SERPL ELPH-MCNC: 3.8 G/DL — SIGNIFICANT CHANGE UP (ref 3.3–5)
ALP SERPL-CCNC: 47 U/L — SIGNIFICANT CHANGE UP (ref 40–120)
ALT FLD-CCNC: 15 U/L — SIGNIFICANT CHANGE UP (ref 12–78)
ANION GAP SERPL CALC-SCNC: 6 MMOL/L — SIGNIFICANT CHANGE UP (ref 5–17)
AST SERPL-CCNC: <3 U/L — LOW (ref 15–37)
BILIRUB SERPL-MCNC: 0.7 MG/DL — SIGNIFICANT CHANGE UP (ref 0.2–1.2)
BUN SERPL-MCNC: 27 MG/DL — HIGH (ref 7–23)
CALCIUM SERPL-MCNC: 9.1 MG/DL — SIGNIFICANT CHANGE UP (ref 8.5–10.1)
CHLORIDE SERPL-SCNC: 103 MMOL/L — SIGNIFICANT CHANGE UP (ref 96–108)
CO2 SERPL-SCNC: 30 MMOL/L — SIGNIFICANT CHANGE UP (ref 22–31)
CREAT SERPL-MCNC: 1.2 MG/DL — SIGNIFICANT CHANGE UP (ref 0.5–1.3)
EGFR: 45 ML/MIN/1.73M2 — LOW
EGFR: 45 ML/MIN/1.73M2 — LOW
FOLATE SERPL-MCNC: >20 NG/ML — SIGNIFICANT CHANGE UP
GLUCOSE SERPL-MCNC: 161 MG/DL — HIGH (ref 70–99)
HCT VFR BLD CALC: 31.8 % — LOW (ref 34.5–45)
HGB BLD-MCNC: 10 G/DL — LOW (ref 11.5–15.5)
MAGNESIUM SERPL-MCNC: 1.7 MG/DL — SIGNIFICANT CHANGE UP (ref 1.6–2.6)
MCHC RBC-ENTMCNC: 31.4 G/DL — LOW (ref 32–36)
MCHC RBC-ENTMCNC: 31.5 PG — SIGNIFICANT CHANGE UP (ref 27–34)
MCV RBC AUTO: 100.3 FL — HIGH (ref 80–100)
NRBC BLD AUTO-RTO: 6 /100 WBCS — HIGH (ref 0–0)
PHOSPHATE SERPL-MCNC: 3.8 MG/DL — SIGNIFICANT CHANGE UP (ref 2.5–4.5)
PLATELET # BLD AUTO: 170 K/UL — SIGNIFICANT CHANGE UP (ref 150–400)
POTASSIUM SERPL-MCNC: 4.6 MMOL/L — SIGNIFICANT CHANGE UP (ref 3.5–5.3)
POTASSIUM SERPL-MCNC: 5.7 MMOL/L — HIGH (ref 3.5–5.3)
POTASSIUM SERPL-SCNC: 4.6 MMOL/L — SIGNIFICANT CHANGE UP (ref 3.5–5.3)
POTASSIUM SERPL-SCNC: 5.7 MMOL/L — HIGH (ref 3.5–5.3)
PROCALCITONIN SERPL-MCNC: 0.29 NG/ML — HIGH
PROT SERPL-MCNC: 6.9 G/DL — SIGNIFICANT CHANGE UP (ref 6–8.3)
RBC # BLD: 3.17 M/UL — LOW (ref 3.8–5.2)
RBC # FLD: 24.2 % — HIGH (ref 10.3–14.5)
SODIUM SERPL-SCNC: 139 MMOL/L — SIGNIFICANT CHANGE UP (ref 135–145)
VIT B12 SERPL-MCNC: 1444 PG/ML — HIGH (ref 232–1245)
WBC # BLD: 1.71 K/UL — LOW (ref 3.8–10.5)
WBC # FLD AUTO: 1.71 K/UL — LOW (ref 3.8–10.5)

## 2025-05-27 PROCEDURE — 99233 SBSQ HOSP IP/OBS HIGH 50: CPT

## 2025-05-27 PROCEDURE — 71250 CT THORAX DX C-: CPT | Mod: 26

## 2025-05-27 RX ORDER — LEFLUNOMIDE 10 MG/1
10 TABLET ORAL DAILY
Refills: 0 | Status: DISCONTINUED | OUTPATIENT
Start: 2025-05-27 | End: 2025-06-05

## 2025-05-27 RX ORDER — TIOTROPIUM BROMIDE INHALATION SPRAY 3.12 UG/1
2 SPRAY, METERED RESPIRATORY (INHALATION) DAILY
Refills: 0 | Status: DISCONTINUED | OUTPATIENT
Start: 2025-05-27 | End: 2025-06-05

## 2025-05-27 RX ORDER — SODIUM ZIRCONIUM CYCLOSILICATE 5 G/5G
10 POWDER, FOR SUSPENSION ORAL ONCE
Refills: 0 | Status: COMPLETED | OUTPATIENT
Start: 2025-05-27 | End: 2025-05-27

## 2025-05-27 RX ADMIN — Medication 40 MILLIGRAM(S): at 13:46

## 2025-05-27 RX ADMIN — SODIUM ZIRCONIUM CYCLOSILICATE 10 GRAM(S): 5 POWDER, FOR SUSPENSION ORAL at 11:38

## 2025-05-27 RX ADMIN — ATORVASTATIN CALCIUM 10 MILLIGRAM(S): 80 TABLET, FILM COATED ORAL at 21:14

## 2025-05-27 RX ADMIN — FUROSEMIDE 40 MILLIGRAM(S): 10 INJECTION INTRAMUSCULAR; INTRAVENOUS at 13:45

## 2025-05-27 RX ADMIN — MIDODRINE HYDROCHLORIDE 10 MILLIGRAM(S): 5 TABLET ORAL at 18:57

## 2025-05-27 RX ADMIN — AMIODARONE HYDROCHLORIDE 100 MILLIGRAM(S): 50 INJECTION, SOLUTION INTRAVENOUS at 05:22

## 2025-05-27 RX ADMIN — TIZANIDINE 2 MILLIGRAM(S): 4 TABLET ORAL at 21:14

## 2025-05-27 RX ADMIN — GABAPENTIN 400 MILLIGRAM(S): 400 CAPSULE ORAL at 05:22

## 2025-05-27 RX ADMIN — GABAPENTIN 400 MILLIGRAM(S): 400 CAPSULE ORAL at 17:34

## 2025-05-27 RX ADMIN — APIXABAN 2.5 MILLIGRAM(S): 2.5 TABLET, FILM COATED ORAL at 05:22

## 2025-05-27 RX ADMIN — PREDNISONE 50 MILLIGRAM(S): 20 TABLET ORAL at 05:22

## 2025-05-27 RX ADMIN — TIOTROPIUM BROMIDE INHALATION SPRAY 2 PUFF(S): 3.12 SPRAY, METERED RESPIRATORY (INHALATION) at 18:57

## 2025-05-27 RX ADMIN — FUROSEMIDE 40 MILLIGRAM(S): 10 INJECTION INTRAMUSCULAR; INTRAVENOUS at 05:23

## 2025-05-27 RX ADMIN — APIXABAN 2.5 MILLIGRAM(S): 2.5 TABLET, FILM COATED ORAL at 17:34

## 2025-05-27 RX ADMIN — LEFLUNOMIDE 10 MILLIGRAM(S): 10 TABLET ORAL at 18:57

## 2025-05-27 RX ADMIN — Medication 1 DOSE(S): at 18:57

## 2025-05-27 NOTE — PROGRESS NOTE ADULT - SUBJECTIVE AND OBJECTIVE BOX
INTERVAL HPI/OVERNIGHT EVENTS: Patient seen and examined at bedside. States she is feeling better this AM. States she feels her breathing is more comfortable with the NC, but not back to baseline. Denies any chest pain, abd pain, palpitations, lightheadedness/dizziness.     ROS: All other review of systems is negative unless indicated above.    MEDICATIONS  (STANDING):  aMIOdarone    Tablet 100 milliGRAM(s) Oral daily  apixaban 2.5 milliGRAM(s) Oral every 12 hours  atorvastatin 10 milliGRAM(s) Oral at bedtime  furosemide   Injectable 40 milliGRAM(s) IV Push two times a day  gabapentin 400 milliGRAM(s) Oral two times a day  leflunomide 10 milliGRAM(s) Oral daily  midodrine. 10 milliGRAM(s) Oral <User Schedule>  pantoprazole    Tablet 40 milliGRAM(s) Oral before breakfast  predniSONE   Tablet 50 milliGRAM(s) Oral daily  tiZANidine 2 milliGRAM(s) Oral at bedtime    MEDICATIONS  (PRN):  albuterol/ipratropium for Nebulization 3 milliLiter(s) Nebulizer every 6 hours PRN Shortness of Breath and/or Wheezing  dicyclomine 10 milliGRAM(s) Oral two times a day before meals PRN bowel irritability  melatonin 3 milliGRAM(s) Oral at bedtime PRN Insomnia      Allergies    No Known Allergies    Intolerances            Vital Signs Last 24 Hrs  T(C): 36.8 (27 May 2025 13:40), Max: 36.9 (27 May 2025 11:27)  T(F): 98.3 (27 May 2025 13:40), Max: 98.4 (27 May 2025 11:27)  HR: 71 (27 May 2025 13:40) (62 - 77)  BP: 120/80 (27 May 2025 13:40) (120/80 - 151/67)  BP(mean): --  RR: 18 (27 May 2025 13:40) (17 - 169)  SpO2: 100% (27 May 2025 13:40) (96% - 100%)    Parameters below as of 27 May 2025 13:40  Patient On (Oxygen Delivery Method): nasal cannula  O2 Flow (L/min): 4      Physical Exam:  General: laying comfortably in bed, NAD, on NC  Neurology: A&Ox3, nonfocal, ORTEGA x 4  Respiratory: +bibasilar crackles  CV: RRR, S1S2  Abdominal: Soft, NT, ND +BS  Extremities: +pitting edema B/l LE, + peripheral pulses      LABS:                        10.0   1.71  )-----------( 170      ( 27 May 2025 06:06 )             31.8     05-27    139  |  103  |  27[H]  ----------------------------<  161[H]  5.7[H]   |  30  |  1.20    Ca    9.1      27 May 2025 06:06  Phos  3.8     05-27  Mg     1.7     05-27    TPro  6.9  /  Alb  3.8  /  TBili  0.7  /  DBili  x   /  AST  <3[L]  /  ALT  15  /  AlkPhos  47  05-27      Urinalysis Basic - ( 27 May 2025 06:06 )    Color: x / Appearance: x / SG: x / pH: x  Gluc: 161 mg/dL / Ketone: x  / Bili: x / Urobili: x   Blood: x / Protein: x / Nitrite: x   Leuk Esterase: x / RBC: x / WBC x   Sq Epi: x / Non Sq Epi: x / Bacteria: x        RADIOLOGY & ADDITIONAL TESTS:

## 2025-05-27 NOTE — PHYSICAL THERAPY INITIAL EVALUATION ADULT - NSPTDMEREC_GEN_A_CORE
Commode: The patient is incapable of utilizing a regular toilet facility and is confined to one level of the home. Patient is in agreement with and able to demonstrated safe use of the bedside commode.

## 2025-05-27 NOTE — CONSULT NOTE ADULT - SUBJECTIVE AND OBJECTIVE BOX
CHIEF COMPLAINT/ REASON FOR VISIT  .. Patient was seen to address the  issue listed under PROBLEM LIST which is located toward bottom of this note     MARTINEZ PATEL 2EAS 201 D1    Allergies    No Known Allergies    Intolerances        PAST MEDICAL & SURGICAL HISTORY:  COPD (chronic obstructive pulmonary disease)      DM (diabetes mellitus)  diet-controlled      PAF (paroxysmal atrial fibrillation)  s/p ablation      Hiatal hernia      H/O aplastic anemia      History of IBS      H/O osteoporosis      HLD (hyperlipidemia)      PMR (polymyalgia rheumatica)      History of basal cell cancer      Chronic kidney disease (CKD)      Hypotension      Presence of IVC filter      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      History of immunodeficiency      Lumbar compression fracture      H/O hiatal hernia      H/O pulmonary fibrosis      H/O sinus bradycardia      History of fracture of right hip  "unoperable"      S/P hysterectomy      S/P foot surgery      S/P knee surgery      After cataract  bilateral eye cataract surgically removed      Closed right hip fracture  rigth hip ORIF july 19 2016      S/P IVC filter  nov 2016      S/P carpal tunnel release  Right 2008 & 6/27/17      H/O kyphoplasty      Port-A-Cath in place  right chest          FAMILY HISTORY:  Family history of bladder cancer (Mother)    Family history of myocardial infarction (Father)    FH: atrial fibrillation (Father)        Home Medications:  amiodarone 100 mg oral tablet: 1 tab(s) orally once a day (in the morning) (26 May 2025 18:19)  Bentyl 10 mg oral capsule: 1 cap(s) orally 2 times a day, As Needed (26 May 2025 18:19)  Eliquis 2.5 mg oral tablet: 1 tab(s) orally 2 times a day (26 May 2025 18:19)  folic acid 1 mg oral tablet: 1 tab(s) orally once a day (in the morning) (26 May 2025 18:19)  gabapentin 400 mg oral capsule: 1 cap(s) orally 2 times a day (26 May 2025 18:19)  IVIG monthly:  (05 May 2025 07:10)  Leflunomide: once a day (after a meal) (05 May 2025 07:10)  midodrine 5 mg oral tablet: 1 tablet orally 2 times a day (26 May 2025 22:58)  predniSONE 5 mg oral tablet: 1 tab(s) orally once a day (26 May 2025 23:02)  Procrit 10,000 units/mL preservative-free injectable solution: injectable once a week WEDNESDAY (05 May 2025 07:10)  Reclast Infusion , IV x 1 yearly:  (05 May 2025 07:10)  simvastatin 10 mg oral tablet: 1 tab(s) orally once a day (at bedtime) (05 May 2025 07:10)  tiZANidine: 2 tab(s) orally once a day (at bedtime) (05 May 2025 07:10)  torsemide 20 mg oral tablet: 1 tab(s) orally once a day (in the morning) (05 May 2025 07:10)  Vitamin D3 , 1 tablet by mouth 3x a week:  (05 May 2025 07:10)      MEDICATIONS  (STANDING):  aMIOdarone    Tablet 100 milliGRAM(s) Oral daily  apixaban 2.5 milliGRAM(s) Oral every 12 hours  atorvastatin 10 milliGRAM(s) Oral at bedtime  furosemide   Injectable 40 milliGRAM(s) IV Push two times a day  gabapentin 400 milliGRAM(s) Oral two times a day  midodrine. 10 milliGRAM(s) Oral <User Schedule>  predniSONE   Tablet 50 milliGRAM(s) Oral daily  tiZANidine 2 milliGRAM(s) Oral at bedtime    MEDICATIONS  (PRN):  albuterol/ipratropium for Nebulization 3 milliLiter(s) Nebulizer every 6 hours PRN Shortness of Breath and/or Wheezing  dicyclomine 10 milliGRAM(s) Oral two times a day before meals PRN bowel irritability  melatonin 3 milliGRAM(s) Oral at bedtime PRN Insomnia      Diet, Regular:   DASH/TLC Sodium & Cholesterol Restricted  2000mL Fluid Restriction ([id number]) (05-26-25 @ 23:27) [Active]          Vital Signs Last 24 Hrs  T(C): 36.3 (27 May 2025 05:05), Max: 36.8 (27 May 2025 02:10)  T(F): 97.4 (27 May 2025 05:05), Max: 98.2 (27 May 2025 02:10)  HR: 63 (27 May 2025 05:05) (62 - 77)  BP: 151/67 (27 May 2025 05:05) (130/77 - 151/67)  BP(mean): --  RR: 17 (27 May 2025 05:05) (17 - 169)  SpO2: 99% (27 May 2025 05:05) (96% - 100%)    Parameters below as of 27 May 2025 05:05  Patient On (Oxygen Delivery Method): nasal cannula  O2 Flow (L/min): 4                LABS:                        10.0   1.71  )-----------( 170      ( 27 May 2025 06:06 )             31.8     05-27    139  |  103  |  27[H]  ----------------------------<  161[H]  5.7[H]   |  30  |  1.20    Ca    9.1      27 May 2025 06:06  Phos  3.8     05-27  Mg     1.7     05-27    TPro  6.9  /  Alb  3.8  /  TBili  0.7  /  DBili  x   /  AST  <3[L]  /  ALT  15  /  AlkPhos  47  05-27      Urinalysis Basic - ( 27 May 2025 06:06 )    Color: x / Appearance: x / SG: x / pH: x  Gluc: 161 mg/dL / Ketone: x  / Bili: x / Urobili: x   Blood: x / Protein: x / Nitrite: x   Leuk Esterase: x / RBC: x / WBC x   Sq Epi: x / Non Sq Epi: x / Bacteria: x            WBC:  WBC Count: 1.71 K/uL (05-27 @ 06:06)  WBC Count: 3.08 K/uL (05-26 @ 18:00)      MICROBIOLOGY:  RECENT CULTURES:                  Sodium:  Sodium: 139 mmol/L (05-27 @ 06:06)  Sodium: 140 mmol/L (05-26 @ 18:00)      1.20 mg/dL 05-27 @ 06:06  1.20 mg/dL 05-26 @ 18:00      Hemoglobin:  Hemoglobin: 10.0 g/dL (05-27 @ 06:06)  Hemoglobin: 8.9 g/dL (05-26 @ 18:00)      Platelets: Platelet Count - Automated: 170 K/uL (05-27 @ 06:06)  Platelet Count - Automated: 156 K/uL (05-26 @ 18:00)      LIVER FUNCTIONS - ( 27 May 2025 06:06 )  Alb: 3.8 g/dL / Pro: 6.9 g/dL / ALK PHOS: 47 U/L / ALT: 15 U/L / AST: <3 U/L / GGT: x             Urinalysis Basic - ( 27 May 2025 06:06 )    Color: x / Appearance: x / SG: x / pH: x  Gluc: 161 mg/dL / Ketone: x  / Bili: x / Urobili: x   Blood: x / Protein: x / Nitrite: x   Leuk Esterase: x / RBC: x / WBC x   Sq Epi: x / Non Sq Epi: x / Bacteria: x        RADIOLOGY & ADDITIONAL STUDIES:      MICROBIOLOGY:  RECENT CULTURES:

## 2025-05-27 NOTE — PHYSICAL THERAPY INITIAL EVALUATION ADULT - GENERAL OBSERVATIONS, REHAB EVAL
Patient received supine in bed, cooperative with physical therapy, +O2 via nasal canula, heart monitor, external catheter

## 2025-05-27 NOTE — PROGRESS NOTE ADULT - ASSESSMENT
85 y/o F with PMH AF on Eliquis, , CKD, aplastic anemia, HTN, HLD, DM who p/w worsening SOB over the past few days with b/l LE edema.  SOB worsens with exertion.  She denies CP/palpitations/syncope/fevers.  She has been compliant with her medications.     SOB/LE edema:  likely HF exacerbation, possible COPD component  start lasix 40mg IV bid  strict I&Os and daily weights  check TTE  labs pending  ECG shows NSR PACs, no ST-T wave abnormalities    AF:  c/w Eliqius for AC  c/w metoprolol for rate control  c/w amio  admit to tele    hypotension:  pt on midodrine 10/10/5 qd at home    HLD:  c/w home zocor    - Monitor and replete lytes, keep K>4 and Mg >2  - Further cardiac workup will depend on clinical course.   - All other workup per primary team    Ynes Castellanos PA-C  Cardiology  Available on TEAMS 84F Hx HTN, HLD, DM, PAF, CKD, aplastic anemia presented w/ worsening SOB and b/l LE edema; admitted for likely HFpEF exacerbation    # SOB, leg edema  - SOB/SWAIN likely 2/2 HFpEF exacerbation w/ superimposed COPD exacerbation  - EKG NSR w/ PACs  - Remains volume up on exam, continue Lasix 40mg IV BID  - Repeat TTE pending  - Strict I&Os, daily weights, fluid restriction    # Afib  - Continue Eliquis, Amiodarone  - Monitor on telemetry    # Hypotension  - Continue Midodrine    # HLD  - Continue Simvastatin    - Monitor and replete lytes, keep K>4 and Mg >2  - Further cardiac workup will depend on clinical course.   - All other workup per primary team    Ynes Castellanos PA-C  Cardiology  Available on TEAMS

## 2025-05-27 NOTE — CARE COORDINATION ASSESSMENT. - NSCAREPROVIDERS_GEN_ALL_CORE_FT
CARE PROVIDERS:  Accepting Physician: Wally Vuong  Access Services: Kurt Miles  Administration: Blake Amaral  Administration: Aurelio Traore  Admitting: Wally Vuong  Attending: Filipe Dunaway  Case Management: Jillian Hollingsworth  Consultant: Jonathan Baxter  Consultant: Kimberly López  Consultant: Oliver Olvera  Covering Team: Jenny Hurtado  ED Attending: Nader Darling  ED Nurse: Shanthi Patterson  Nurse: Shanthi Patterson  Nurse: Anh Wei  Nurse: Chantelle Gifford  Nurse: Valeria Gant  Oncology: Marimar Garcia  Oncology: Madison Soto  Oncology: Jeffery Talley  Ordered: [facility], [facility]  Outpatient Provider: Renny Gallegos  Outpatient Provider: Severiano Rodas  Outpatient Provider: Rashid Stout  Override: Shanthi Patterson  Override: Valeria Gant  PCA/Nursing Assistant: Hussain Goodrich  Primary Team: Ynes Castellanos  Primary Team: Wally Vuong  Registered Dietitian: Evette Reed  Respiratory Therapy: Megan Echevarria  Team: [facility], Team  UR// Supp. Assoc.: Carolina Yan

## 2025-05-27 NOTE — PROGRESS NOTE ADULT - SUBJECTIVE AND OBJECTIVE BOX
[facility] -- Sai Valle, Girish Melgoza Savella, , Rene Hernandez  Office # [phone]    Follow Up:      Subjective/Observations:     REVIEW OF SYSTEMS: All other review of systems is negative unless indicated above  PAST MEDICAL & SURGICAL HISTORY:  COPD (chronic obstructive pulmonary disease)      DM (diabetes mellitus)  diet-controlled      PAF (paroxysmal atrial fibrillation)  s/p ablation      Hiatal hernia      H/O aplastic anemia      History of IBS      H/O osteoporosis      HLD (hyperlipidemia)      PMR (polymyalgia rheumatica)      History of basal cell cancer      Chronic kidney disease (CKD)      Hypotension      Presence of IVC filter      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      History of immunodeficiency      Lumbar compression fracture      H/O hiatal hernia      H/O pulmonary fibrosis      H/O sinus bradycardia      History of fracture of right hip  "unoperable"      S/P hysterectomy      S/P foot surgery      S/P knee surgery      After cataract  bilateral eye cataract surgically removed      Closed right hip fracture  rigth hip ORIF july 19 2016      S/P IVC filter  nov 2016      S/P carpal tunnel release  Right 2008 & 6/27/17      H/O kyphoplasty      Port-A-Cath in place  right chest        MEDICATIONS  (STANDING):  aMIOdarone    Tablet 100 milliGRAM(s) Oral daily  apixaban 2.5 milliGRAM(s) Oral every 12 hours  atorvastatin 10 milliGRAM(s) Oral at bedtime  furosemide   Injectable 40 milliGRAM(s) IV Push two times a day  gabapentin 400 milliGRAM(s) Oral two times a day  midodrine. 10 milliGRAM(s) Oral <User Schedule>  predniSONE   Tablet 50 milliGRAM(s) Oral daily  tiZANidine 2 milliGRAM(s) Oral at bedtime    MEDICATIONS  (PRN):  albuterol/ipratropium for Nebulization 3 milliLiter(s) Nebulizer every 6 hours PRN Shortness of Breath and/or Wheezing  dicyclomine 10 milliGRAM(s) Oral two times a day before meals PRN bowel irritability  melatonin 3 milliGRAM(s) Oral at bedtime PRN Insomnia    Allergies    No Known Allergies    Intolerances      Vital Signs Last 24 Hrs  T(C): 36.3 (27 May 2025 05:05), Max: 36.8 (27 May 2025 02:10)  T(F): 97.4 (27 May 2025 05:05), Max: 98.2 (27 May 2025 02:10)  HR: 63 (27 May 2025 05:05) (62 - 77)  BP: 151/67 (27 May 2025 05:05) (130/77 - 151/67)  BP(mean): --  RR: 17 (27 May 2025 05:05) (17 - 169)  SpO2: 99% (27 May 2025 05:05) (96% - 100%)    Parameters below as of 27 May 2025 05:05  Patient On (Oxygen Delivery Method): nasal cannula  O2 Flow (L/min): 4    I&O's Summary    Weight (kg): 64.4 (05-26 @ 17:13)    PHYSICAL EXAM:  TELE:   Constitutional: NAD, awake and alert, well-developed  HEENT: Moist Mucous Membranes, Anicteric  Pulmonary: Non-labored, breath sounds are clear bilaterally, No wheezing, rales or rhonchi  Cardiovascular: Regular, S1 and S2, No murmurs, rubs, gallops or clicks  Gastrointestinal: Bowel Sounds present, soft, nontender.   Neurological: AAOx3, no focal neuro deficits  Lymph: No peripheral edema. No lymphadenopathy.  Skin: No visible rashes or ulcers.  Psych:  Mood & affect appropriate    LABS: All Labs Reviewed:                        10.0   1.71  )-----------( 170      ( 27 May 2025 06:06 )             31.8                         8.9    3.08  )-----------( 156      ( 26 May 2025 18:00 )             28.9     27 May 2025 06:06    139    |  103    |  27     ----------------------------<  161    5.7     |  30     |  1.20   26 May 2025 18:00    140    |  104    |  26     ----------------------------<  151    4.7     |  32     |  1.20     Ca    9.1        27 May 2025 06:06  Ca    8.6        26 May 2025 18:00  Phos  3.8       27 May 2025 06:06  Mg     1.7       27 May 2025 06:06    TPro  6.9    /  Alb  3.8    /  TBili  0.7    /  DBili  x      /  AST  <3     /  ALT  15     /  AlkPhos  47     27 May 2025 06:06  TPro  6.3    /  Alb  3.4    /  TBili  0.6    /  DBili  x      /  AST  7      /  ALT  14     /  AlkPhos  42     26 May 2025 18:00               EXAM:  ECHO TTE W/O CON COMP W/DOPPLR           PROCEDURE DATE:  07/17/2016      INTERPRETATION:  Ordering Physician: LIA MOLINA    Indication: Syncope    Study Quality: Technically difficult and limited     Height: 167 cm  Weight: 91 kg  BSA: 2.0 sq m  Blood Pressure: 104/70    MEASUREMENTS  IVS: 1.4cm  PWT: 1.4cm  LA: 4.4cm  AO: 3.6cm  LVIDd: 4.6cm  LVIDs: 2.4cm    FINDINGS  Left Ventricle: The endocardium is not well-visualized. Grossly, there   appears to be normal left ventricular systolic function.  Aortic Valve: Calcified trileaflet aortic valve with normal opening. No   significant aortic insufficiency.  Mitral Valve: Mitral annular calcification and calcified mitral valve   leaflets. Mild mitral insufficiency.  Tricuspid Valve: Not well visualized. Grossly normal. Mild tricuspid   insufficiency.  Pulmonic Valve: Not well visualized. Mild pulmonic insufficiency.  Left Atrium: Enlarged  Right Ventricle: Normal right ventricular size and systolic function.  Right Atrium: Enlarged  Diastolic function: Grade 1 diastolic dysfunction.                   DAVID MELGOZA M.D., ATTENDING CARDIOLOGIST  This document has been electronically signed. Jul 18 2016  1:12P                        [facility] -- Sai Valle, Girish Melgoza Savella, , Rene Hernandez  Office # [phone]    Follow Up:  SOB, PAF    Subjective/Observations: Pt seen and assessed at bedside. Endorses feeling well. Denies chest pain, palpitations, SOB/SWAIN or any other complaints. Tolerating 4L nasal cannula. No events overnight.    REVIEW OF SYSTEMS: All other review of systems is negative unless indicated above  PAST MEDICAL & SURGICAL HISTORY:  COPD (chronic obstructive pulmonary disease)      DM (diabetes mellitus)  diet-controlled      PAF (paroxysmal atrial fibrillation)  s/p ablation      Hiatal hernia      H/O aplastic anemia      History of IBS      H/O osteoporosis      HLD (hyperlipidemia)      PMR (polymyalgia rheumatica)      History of basal cell cancer      Chronic kidney disease (CKD)      Hypotension      Presence of IVC filter      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      History of immunodeficiency      Lumbar compression fracture      H/O hiatal hernia      H/O pulmonary fibrosis      H/O sinus bradycardia      History of fracture of right hip  "unoperable"      S/P hysterectomy      S/P foot surgery      S/P knee surgery      After cataract  bilateral eye cataract surgically removed      Closed right hip fracture  rigth hip ORIF july 19 2016      S/P IVC filter  nov 2016      S/P carpal tunnel release  Right 2008 & 6/27/17      H/O kyphoplasty      Port-A-Cath in place  right chest        MEDICATIONS  (STANDING):  aMIOdarone    Tablet 100 milliGRAM(s) Oral daily  apixaban 2.5 milliGRAM(s) Oral every 12 hours  atorvastatin 10 milliGRAM(s) Oral at bedtime  furosemide   Injectable 40 milliGRAM(s) IV Push two times a day  gabapentin 400 milliGRAM(s) Oral two times a day  midodrine. 10 milliGRAM(s) Oral <User Schedule>  predniSONE   Tablet 50 milliGRAM(s) Oral daily  tiZANidine 2 milliGRAM(s) Oral at bedtime    MEDICATIONS  (PRN):  albuterol/ipratropium for Nebulization 3 milliLiter(s) Nebulizer every 6 hours PRN Shortness of Breath and/or Wheezing  dicyclomine 10 milliGRAM(s) Oral two times a day before meals PRN bowel irritability  melatonin 3 milliGRAM(s) Oral at bedtime PRN Insomnia    Allergies    No Known Allergies    Intolerances      Vital Signs Last 24 Hrs  T(C): 36.3 (27 May 2025 05:05), Max: 36.8 (27 May 2025 02:10)  T(F): 97.4 (27 May 2025 05:05), Max: 98.2 (27 May 2025 02:10)  HR: 63 (27 May 2025 05:05) (62 - 77)  BP: 151/67 (27 May 2025 05:05) (130/77 - 151/67)  BP(mean): --  RR: 17 (27 May 2025 05:05) (17 - 169)  SpO2: 99% (27 May 2025 05:05) (96% - 100%)    Parameters below as of 27 May 2025 05:05  Patient On (Oxygen Delivery Method): nasal cannula  O2 Flow (L/min): 4    I&O's Summary    Weight (kg): 64.4 (05-26 @ 17:13)    PHYSICAL EXAM:  TELE: sinus arrhythmia 70s  Constitutional: NAD, awake and alert, well-developed  HEENT: Moist Mucous Membranes, Anicteric  Pulmonary: Diffuse crackles  Cardiovascular: Regular, S1 and S2, No murmurs, rubs, gallops or clicks  Gastrointestinal: Bowel Sounds present, soft, nontender.   Neurological: AAOx3, no focal neuro deficits  Lymph: No peripheral edema. No lymphadenopathy.  Skin: No visible rashes or ulcers.  Psych:  Mood & affect appropriate    LABS: All Labs Reviewed:                        10.0   1.71  )-----------( 170      ( 27 May 2025 06:06 )             31.8                         8.9    3.08  )-----------( 156      ( 26 May 2025 18:00 )             28.9     27 May 2025 06:06    139    |  103    |  27     ----------------------------<  161    5.7     |  30     |  1.20   26 May 2025 18:00    140    |  104    |  26     ----------------------------<  151    4.7     |  32     |  1.20     Ca    9.1        27 May 2025 06:06  Ca    8.6        26 May 2025 18:00  Phos  3.8       27 May 2025 06:06  Mg     1.7       27 May 2025 06:06    TPro  6.9    /  Alb  3.8    /  TBili  0.7    /  DBili  x      /  AST  <3     /  ALT  15     /  AlkPhos  47     27 May 2025 06:06  TPro  6.3    /  Alb  3.4    /  TBili  0.6    /  DBili  x      /  AST  7      /  ALT  14     /  AlkPhos  42     26 May 2025 18:00               EXAM:  ECHO TTE W/O CON COMP W/DOPPLR           PROCEDURE DATE:  07/17/2016      INTERPRETATION:  Ordering Physician: LIA MOLINA    Indication: Syncope    Study Quality: Technically difficult and limited     Height: 167 cm  Weight: 91 kg  BSA: 2.0 sq m  Blood Pressure: 104/70    MEASUREMENTS  IVS: 1.4cm  PWT: 1.4cm  LA: 4.4cm  AO: 3.6cm  LVIDd: 4.6cm  LVIDs: 2.4cm    FINDINGS  Left Ventricle: The endocardium is not well-visualized. Grossly, there   appears to be normal left ventricular systolic function.  Aortic Valve: Calcified trileaflet aortic valve with normal opening. No   significant aortic insufficiency.  Mitral Valve: Mitral annular calcification and calcified mitral valve   leaflets. Mild mitral insufficiency.  Tricuspid Valve: Not well visualized. Grossly normal. Mild tricuspid   insufficiency.  Pulmonic Valve: Not well visualized. Mild pulmonic insufficiency.  Left Atrium: Enlarged  Right Ventricle: Normal right ventricular size and systolic function.  Right Atrium: Enlarged  Diastolic function: Grade 1 diastolic dysfunction.                   DAVID MELGOZA M.D., ATTENDING CARDIOLOGIST  This document has been electronically signed. Jul 18 2016  1:12P

## 2025-05-27 NOTE — PROGRESS NOTE ADULT - ASSESSMENT
85 y/o F with PMH AFon Eliquis, COPD, CKD, aplastic anemia, PMR (chronic prednisone with AVN hip), HLD, HTN, DM who p/w worsening SOB over the past few days admitted for likely CHF exacerbation.

## 2025-05-27 NOTE — CARE COORDINATION ASSESSMENT. - NSPASTMEDSURGHISTORY_GEN_ALL_CORE_FT
PAST MEDICAL & SURGICAL HISTORY:  Hiatal hernia      PAF (paroxysmal atrial fibrillation)  s/p ablation      DM (diabetes mellitus)  diet-controlled      COPD (chronic obstructive pulmonary disease)      S/P knee surgery      S/P foot surgery      S/P hysterectomy      After cataract  bilateral eye cataract surgically removed      S/P IVC filter  nov 2016      Closed right hip fracture  rigth hip ORIF july 19 2016      S/P carpal tunnel release  Right 2008 & 6/27/17      History of basal cell cancer      PMR (polymyalgia rheumatica)      HLD (hyperlipidemia)      H/O osteoporosis      History of IBS      H/O aplastic anemia      Lumbar compression fracture      History of immunodeficiency      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      Presence of IVC filter      Hypotension      Chronic kidney disease (CKD)      H/O kyphoplasty      H/O sinus bradycardia      H/O pulmonary fibrosis      H/O hiatal hernia      History of fracture of right hip  "unoperable"      Port-A-Cath in place  right chest

## 2025-05-27 NOTE — PHYSICAL THERAPY INITIAL EVALUATION ADULT - PERTINENT HX OF CURRENT PROBLEM, REHAB EVAL
83 y/o F with PMH AFon Eliquis, COPD (not on home o2), CKD, aplastic anemia, PMR (chronic prednisone with AVN hip), HLD, HTN, DM who p/w worsening SOB over the past two days, denies CP/ palpitations, has home O2 setup (but doesn't use) but put it on after noticing SpO2 in the 80's, which improved SOB. Endorses worsening SWAIN, denies significant wheezing, cough, recent illness med changes, or changes in diet. Adherent with home meds.

## 2025-05-27 NOTE — PATIENT CHOICE NOTE. - NSPTCHOICENOTES_GEN_ALL_CORE
Transition information and choice lists provided. [facility] program explained, pt verbalizes understanding of and agrees w/ [facility] and chooses [facility].

## 2025-05-27 NOTE — PHYSICAL THERAPY INITIAL EVALUATION ADULT - ADDITIONAL COMMENTS
Patient lives in private home, +ramp access, then resides on main level. Patient was independent in all ADLs, has home O2, and uses RW to ambulate in home.  Patient uses wheelchair for long distance locomotion.

## 2025-05-27 NOTE — CONSULT NOTE ADULT - ASSESSMENT
Initial evaluation/Pulmonary Critical Care Consultation requested by  DR GALLEGOS on  5/27/2025 from Dr Jonathan Baxter    Patient examined chart reviewed    HOSPITAL ADMISSION   PATIENT CAME  FROM (if information available)      REASON FOR VISIT  .. Management of problems listed below      EVENTS/CURRENT ISSUES.  . 5/27/2025 being rxed for copd chf   . 5/27/2025 85 y/o F with PMH AFon Eliquis, COPD (not on home o2), CKD, aplastic anemia, PMR (chronic prednisone with AVN hip aw sob wheeze         REVIEW OF SYMPTOMS   Able to give ROS  Yes     RELIABILITY +/-   CONSTITUTIONAL Weakness Yes    ENDOCRINE  No heat or cold intolerance    ALLERGY No hives  Sore throat No stridor  RESP Shortness of breath YES   NEURO New weakness No   CARDIAC   Palpitations No         PHYSICAL EXAM    HEENT Unremarkable  atraumatic   RESP Fair air entry  Harsh breath sound   CARDIAC S1 S2 No S3     NO JVD    ABDOMEN No hepatosplenomegaly   PEDAL EDEMA present No calf tenderness  NO rash     REASON FOR VISIT  .. Management of problems listed below      CC.   . 5/26/2025 c/o shortness of breath- was 87% on room air- patient was wheezing- poatient given 1 albuterol treatment by ems- on 4 litres patient saturating at 96%  shortness of breath  OVERALL PRESENTATION.  . 5/26/2025 85 y/o F with PMH AFon Eliquis, COPD (not on home o2), CKD, aplastic anemia, PMR (chronic prednisone with AVN hip), HLD, HTN, DM who p/w worsening SOB over the past two days, denies CP/ palpitations, has home O2 setup (but doesn't use) but put it on after noticing SpO2 in the 80's, which improved SOB. Endorses worsening SWAIN, denies significant wheezing, cough, recent illness med changes, or changes in diet. Adherent with home meds.    Denies fever, chills, chest pain, palpitations, cough, abdominal pain, nausea, vomiting, diarrhea, constipation, urinary frequency, urgency, or dysuria, headaches, changes in vision, dizziness, numbness, tingling.    ED Course:   Vitals: BP: 130/77, HR: 77, Temp: 98, RR: 26, SpO2: 96% on 4L NC  Labs: BNP 2185, trop WNL, WBC: 3.08 (around baseline), Hb 8.9 () (around baseline), HCO3- 32,  RVP negative,     CXR: linear interstitial prominence in b/l bases, trace pleural fluid, potential cardiogenic pulmonary edema, medi-port tip at cavoatrial junction, as per personal read, official read pending   B/L LE Dopplers: No evidence of DVT  EKG: NSR PACs  Received in the ED: lasix 20mg IV, solumedrol 125mg IV, duonebs  PMH.      PAST HOSPITAL STAYS .  Home Medications:   * No Current Medications as of 05-May-2025 09:54 documented in Structured Notes  · folic acid 1 mg oral tablet: 1 tab(s) orally once a day (in the morning)  · midodrine 5 mg oral tablet: 1 tablet orally 3 times a day  · torsemide 20 mg oral tablet: 1 tab(s) orally once a day (in the morning)  · gabapentin 400 mg oral capsule: 1 cap(s) orally 2 times a day  · tiZANidine: 2 tab(s) orally once a day (at bedtime)  · Singulair 10 mg oral tablet: 1 tab(s) orally once a day (in the morning)  · Bentyl 10 mg oral capsule: 1 cap(s) orally 2 times a day, As Needed  · Leflunomide: once a day (after a meal)  · NexIUM 20 mg oral delayed release capsule: 1 cap(s) orally once a day (in the morning)  · simvastatin 10 mg oral tablet: 1 tab(s) orally once a day (at bedtime)  · Reclast Infusion , IV x 1 yearly:   · Vitamin D3 , 1 tablet by mouth 3x a week:   · IVIG monthly:   · amiodarone 100 mg oral tablet: 1 tab(s) orally once a day (in the morning)  · oxyCODONE 5 mg oral tablet: 1 tab(s) orally prn  · Eliquis 2.5 mg oral tablet: 1 tab(s) orally 2 times a day  · Procrit 10,000   ER MGMT .      BEST PRACTICE ISSUES.  . HOB ELEVATN.    .... Yes  . DIET  .   .... DASH 2L FR 5/26  . FREE WATER.   ....   .  IV FLUID.  .....   . PHARMAC DVT PPLX .    .... APIXABA 5/26 a 2.5 x 2 (nv af)   . NON PHARMAC DVT PPLX .      . STRESS ULCR PPLX .   .... PROTONIX 40 5/27/2025  . DATE/DM MGMT.   ..... See under Endocrine section   GENERAL DATA .   . COVID.         .... scv2 5/27/2025 (-)   . GOC.    ....    . ICU STAY.    .... no   . INFECTION PPLX .   ....   . ALLGY.   ....  nka  . WT.   .... 5/27/2025 64  . BMI.  .... 5/27/2025 25       [id number]  VITALS/GAS EXCHANGE/DRIPS    ABGS.     .  VS/ PO/IO/ VENT/ DRIPS.   5/27/2025 afeb 64 120/70  5/27/2025 4l 100%    XXXXXXXX[id number]  PROBLEM ASSESSMENT RECOMMENDATIONS.  RESP.   . GAS EXCHANGE .   .... target PO 90-95%     . SOB  .... DD COPD CHF     . RO VTE  .... venous duplx 5/26 (-)    . COPD  .... PREDNISONE 50 5/27/2025   .... DUONEB.4 P 5/27/2025    INFECTION.  . DATA  .... w 5/26-5/27 W 3 -  1.7   .... pr 5/27/2025 pr .29   .... cxr 5/26 napd   .... ct ch 5/27/2025 cw 9/2/2023   ........ mild tracheobr secretns   ........ mosaic attenuation   ........ bl nodularity similar   ....... scattered linear and dependent atelectasis   ....... sm bl effsns   ....... mod indeterminate t8 veretebral body fr new   .... rvp 5/26/2025 rvp (-)   .... no obvious active infectn      CARDIAC.  . ORTHOSTATIC HYPOTENSION  .... MIDODRINE 10.2 5/26    . CAD    .... Trop 5/27/2025 tr 15       . CHF  .... 5/27/2025  1 + p edema  .... pbnp 5/27/2025 pbnp 2185  .... lasix 5/26 l 40.2   .... check tte     . A fib   .... APIXABA 5/26 a 2.5 x 2   .... AMIODARONE 5/27/2025 5/26 A 100    HEMAT.  .... Plt 5/27/2025 plt 170   ....  monitor    . IMMUNOSUPPRESSIVE  .... LEFLUNAMIDE 10 5/27/2025    . ANEMIA  .... Hb 5/27/2025 HB 10  .... fa 5/27/2025 fa > 20   .... b12 5/27/2025 b12 1444   .... target hb 7 (+)    . LEUKOPENIA   .... w 5/26-5/27 W 3 -  1.7     GI.   . DIET .   .... dash 2 liter fr 5/26    RENAL.  . DATA  .... Na 5/27/2025 Na 139   .... CO2 5/27/2025 co2 30   .... Cr 5/27/2025 Cr 1.2   .... monitor    . HYPERKALEMIA   .... K 5/27/2025 K 5.7     NEURO.  . PAIN  .... TIZANADINE 5/26 T 2 HS       [id number]   SUMMARY BASELINE .     . 85 y/o F with PMH AFon Eliquis, COPD (not on home o2), CKD, aplastic anemia, PMR (chronic prednisone with AVN hip), HLD, HTN, DM pt of Dr Gallegos not on home ox or home cpap used to use trelegy lives with spouse quit 40 y 1/2 ppd  CC.   . 5/26/2025 SHORTNESS OF BREATH   . 5/26/2026 HYPOXEMIA   . 5/26/2025 WHEEZE   MAIN ISSUES.  . HYPOXIA 5/26/2025  . SOB  .... DD COPD CHF   .... venous duplx 5/26 (-)  . COPD ex   . A fib   .... APIXABA 5/26 a 2.5 x 2  . CHF  .... pbnp 5/27/2025 pbnp 2185  . ANEMIA  .... Hb 5/27/2025 HB 10  . LEUKOPENIA   .... w 5/26-5/27 W 3 -  1.7     PMH.   . AFon Eliquis,   . COPD (not on home o2),   . CKD,   . aplastic anemia,   . PMR (chronic prednisone with AVN hip),   . HLD,   . HTN,   . DM    PROCEDURES/DEVICES .      DISCUSSIONS.  .... Discussed with primary care and relevant consultants on an ongoing basis       TIME SPENT.  . Over 55  minutes aggregate care time spent on encounter; activities included   direct patient care, counseling and/or coordinating care reviewing notes, lab data/ imaging , discussion with multidisciplinary team/ patient  /family and explaining in detail risks, benefits, alternatives  of the recommendations     ROBERT HENRIQUEZ 84 5/27/2025 1942

## 2025-05-27 NOTE — PATIENT PROFILE ADULT - FALL HARM RISK - HARM RISK INTERVENTIONS

## 2025-05-27 NOTE — PATIENT PROFILE ADULT - HAVE YOU RECENTLY LOST WEIGHT WITHOUT TRYING?
See pt's [facility] message regarding switching pharmacy. LOV was on 6/20/19 with behavioral health services, no notes found when pt need to f/u. Sent 6 months supply on Advair & Montelukast. But sent only 3 months supply on zoloft.     Please review & schedule pt for f/u as appropriate. Thanks.     PHQ-9 ([employer]) 6/11/2019   PHQ-9 Total Score 21     LIZBETH-7   [employer], 2002; Used with Permission) 6/20/2019   LIZBETH 7 TOTAL SCORE 16 (severe anxiety)     Mariluz, RN  Triage Nurse           No (0)

## 2025-05-28 ENCOUNTER — TRANSCRIPTION ENCOUNTER (OUTPATIENT)
Age: 85
End: 2025-05-28

## 2025-05-28 LAB
ANION GAP SERPL CALC-SCNC: 8 MMOL/L — SIGNIFICANT CHANGE UP (ref 5–17)
BUN SERPL-MCNC: 41 MG/DL — HIGH (ref 7–23)
CALCIUM SERPL-MCNC: 9.3 MG/DL — SIGNIFICANT CHANGE UP (ref 8.5–10.1)
CHLORIDE SERPL-SCNC: 100 MMOL/L — SIGNIFICANT CHANGE UP (ref 96–108)
CO2 SERPL-SCNC: 33 MMOL/L — HIGH (ref 22–31)
CREAT SERPL-MCNC: 1.3 MG/DL — SIGNIFICANT CHANGE UP (ref 0.5–1.3)
EGFR: 41 ML/MIN/1.73M2 — LOW
EGFR: 41 ML/MIN/1.73M2 — LOW
GLUCOSE SERPL-MCNC: 105 MG/DL — HIGH (ref 70–99)
HCT VFR BLD CALC: 27.7 % — LOW (ref 34.5–45)
HGB BLD-MCNC: 8.9 G/DL — LOW (ref 11.5–15.5)
MCHC RBC-ENTMCNC: 31.8 PG — SIGNIFICANT CHANGE UP (ref 27–34)
MCHC RBC-ENTMCNC: 32.1 G/DL — SIGNIFICANT CHANGE UP (ref 32–36)
MCV RBC AUTO: 98.9 FL — SIGNIFICANT CHANGE UP (ref 80–100)
NRBC BLD AUTO-RTO: 3 /100 WBCS — HIGH (ref 0–0)
PLATELET # BLD AUTO: 135 K/UL — LOW (ref 150–400)
POTASSIUM SERPL-MCNC: 4.6 MMOL/L — SIGNIFICANT CHANGE UP (ref 3.5–5.3)
POTASSIUM SERPL-SCNC: 4.6 MMOL/L — SIGNIFICANT CHANGE UP (ref 3.5–5.3)
RBC # BLD: 2.8 M/UL — LOW (ref 3.8–5.2)
RBC # FLD: 24 % — HIGH (ref 10.3–14.5)
SODIUM SERPL-SCNC: 141 MMOL/L — SIGNIFICANT CHANGE UP (ref 135–145)
WBC # BLD: 2.26 K/UL — LOW (ref 3.8–10.5)
WBC # FLD AUTO: 2.26 K/UL — LOW (ref 3.8–10.5)

## 2025-05-28 PROCEDURE — 99232 SBSQ HOSP IP/OBS MODERATE 35: CPT

## 2025-05-28 RX ORDER — ALBUTEROL SULFATE 2.5 MG/3ML
2.5 VIAL, NEBULIZER (ML) INHALATION EVERY 6 HOURS
Refills: 0 | Status: DISCONTINUED | OUTPATIENT
Start: 2025-05-28 | End: 2025-06-05

## 2025-05-28 RX ORDER — TORSEMIDE 10 MG
20 TABLET ORAL DAILY
Refills: 0 | Status: DISCONTINUED | OUTPATIENT
Start: 2025-05-28 | End: 2025-06-05

## 2025-05-28 RX ORDER — ALBUTEROL SULFATE 2.5 MG/3ML
1 VIAL, NEBULIZER (ML) INHALATION EVERY 4 HOURS
Refills: 0 | Status: DISCONTINUED | OUTPATIENT
Start: 2025-05-28 | End: 2025-06-05

## 2025-05-28 RX ADMIN — Medication 1 DOSE(S): at 05:50

## 2025-05-28 RX ADMIN — TIOTROPIUM BROMIDE INHALATION SPRAY 2 PUFF(S): 3.12 SPRAY, METERED RESPIRATORY (INHALATION) at 05:50

## 2025-05-28 RX ADMIN — ATORVASTATIN CALCIUM 10 MILLIGRAM(S): 80 TABLET, FILM COATED ORAL at 21:22

## 2025-05-28 RX ADMIN — MIDODRINE HYDROCHLORIDE 10 MILLIGRAM(S): 5 TABLET ORAL at 18:13

## 2025-05-28 RX ADMIN — PREDNISONE 50 MILLIGRAM(S): 20 TABLET ORAL at 05:51

## 2025-05-28 RX ADMIN — APIXABAN 2.5 MILLIGRAM(S): 2.5 TABLET, FILM COATED ORAL at 05:51

## 2025-05-28 RX ADMIN — MIDODRINE HYDROCHLORIDE 10 MILLIGRAM(S): 5 TABLET ORAL at 05:51

## 2025-05-28 RX ADMIN — GABAPENTIN 400 MILLIGRAM(S): 400 CAPSULE ORAL at 05:51

## 2025-05-28 RX ADMIN — TIZANIDINE 2 MILLIGRAM(S): 4 TABLET ORAL at 21:21

## 2025-05-28 RX ADMIN — Medication 1 DOSE(S): at 18:13

## 2025-05-28 RX ADMIN — Medication 2.5 MILLIGRAM(S): at 12:11

## 2025-05-28 RX ADMIN — Medication 2.5 MILLIGRAM(S): at 21:03

## 2025-05-28 RX ADMIN — Medication 40 MILLIGRAM(S): at 05:51

## 2025-05-28 RX ADMIN — LEFLUNOMIDE 10 MILLIGRAM(S): 10 TABLET ORAL at 17:09

## 2025-05-28 RX ADMIN — APIXABAN 2.5 MILLIGRAM(S): 2.5 TABLET, FILM COATED ORAL at 17:08

## 2025-05-28 RX ADMIN — GABAPENTIN 400 MILLIGRAM(S): 400 CAPSULE ORAL at 17:08

## 2025-05-28 RX ADMIN — AMIODARONE HYDROCHLORIDE 100 MILLIGRAM(S): 50 INJECTION, SOLUTION INTRAVENOUS at 05:51

## 2025-05-28 RX ADMIN — Medication 20 MILLIGRAM(S): at 12:43

## 2025-05-28 NOTE — PROGRESS NOTE ADULT - SUBJECTIVE AND OBJECTIVE BOX
[facility] -- Sai Valle, Girish Melgoza Savella, Goodger, Cohen: Office # [phone]    Follow Up:  pAF    Subjective/Observations: No events overnight resting comfortably in bed.  No complaints of chest pain, dyspnea, or palpitations reported. No signs of orthopnea or PND. 4 L Wearing nasal cannula      REVIEW OF SYSTEMS: All other review of systems are negative unless indicated above    PAST MEDICAL & SURGICAL HISTORY:  COPD (chronic obstructive pulmonary disease)      DM (diabetes mellitus)  diet-controlled      PAF (paroxysmal atrial fibrillation)  s/p ablation      Hiatal hernia      H/O aplastic anemia      History of IBS      H/O osteoporosis      HLD (hyperlipidemia)      PMR (polymyalgia rheumatica)      History of basal cell cancer      Chronic kidney disease (CKD)      Hypotension      Presence of IVC filter      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      History of immunodeficiency      Lumbar compression fracture      H/O hiatal hernia      H/O pulmonary fibrosis      H/O sinus bradycardia      History of fracture of right hip  "unoperable"      S/P hysterectomy      S/P foot surgery      S/P knee surgery      After cataract  bilateral eye cataract surgically removed      Closed right hip fracture  rigth hip ORIF july 19 2016      S/P IVC filter  nov 2016      S/P carpal tunnel release  Right 2008 & 6/27/17      H/O kyphoplasty      Port-A-Cath in place  right chest          MEDICATIONS  (STANDING):  aMIOdarone    Tablet 100 milliGRAM(s) Oral daily  apixaban 2.5 milliGRAM(s) Oral every 12 hours  atorvastatin 10 milliGRAM(s) Oral at bedtime  fluticasone propionate/ salmeterol 250-50 MICROgram(s) Diskus 1 Dose(s) Inhalation two times a day  furosemide   Injectable 40 milliGRAM(s) IV Push two times a day  gabapentin 400 milliGRAM(s) Oral two times a day  leflunomide 10 milliGRAM(s) Oral daily  midodrine. 10 milliGRAM(s) Oral <User Schedule>  pantoprazole    Tablet 40 milliGRAM(s) Oral before breakfast  predniSONE   Tablet 50 milliGRAM(s) Oral daily  tiotropium 2.5 MICROgram(s) Inhaler 2 Puff(s) Inhalation daily  tiZANidine 2 milliGRAM(s) Oral at bedtime    MEDICATIONS  (PRN):  albuterol/ipratropium for Nebulization 3 milliLiter(s) Nebulizer every 6 hours PRN Shortness of Breath and/or Wheezing  dicyclomine 10 milliGRAM(s) Oral two times a day before meals PRN bowel irritability  melatonin 3 milliGRAM(s) Oral at bedtime PRN Insomnia    Allergies    No Known Allergies    Intolerances      Vital Signs Last 24 Hrs  T(C): 36.4 (28 May 2025 04:45), Max: 37.6 (27 May 2025 20:14)  T(F): 97.6 (28 May 2025 04:45), Max: 99.6 (27 May 2025 20:14)  HR: 69 (28 May 2025 04:45) (64 - 72)  BP: 100/58 (28 May 2025 04:45) (100/58 - 124/78)  BP(mean): --  RR: 18 (28 May 2025 04:45) (18 - 18)  SpO2: 94% (28 May 2025 04:45) (94% - 100%)    Parameters below as of 28 May 2025 04:45  Patient On (Oxygen Delivery Method): nasal cannula  O2 Flow (L/min): 4    I&O's Summary    27 May 2025 07:01  -  28 May 2025 07:00  --------------------------------------------------------  IN: 0 mL / OUT: 1100 mL / NET: -1100 mL          TELE:  Pac  PHYSICAL EXAM:  Constitutional: NAD, awake and alert  HEENT: Moist Mucous Membranes, Anicteric  Pulmonary: Non-labored, breath sounds are clear bilaterally, No wheezing, rales or rhonchi  Cardiovascular: Regular, S1 and S2, No murmurs, No rubs, gallops or clicks  Gastrointestinal:  soft, nontender, nondistended   Lymph: No peripheral edema. No lymphadenopathy.   Skin: No visible rashes or ulcers.  Psych:  Mood & affect appropriate      LABS: All Labs Reviewed:                        8.9    2.26  )-----------( 135      ( 28 May 2025 05:45 )             27.7                         10.0   1.71  )-----------( 170      ( 27 May 2025 06:06 )             31.8                         8.9    3.08  )-----------( 156      ( 26 May 2025 18:00 )             28.9     28 May 2025 05:45    141    |  100    |  41     ----------------------------<  105    4.6     |  33     |  1.30   27 May 2025 16:34    x      |  x      |  x      ----------------------------<  x      4.6     |  x      |  x      27 May 2025 06:06    139    |  103    |  27     ----------------------------<  161    5.7     |  30     |  1.20     Ca    9.3        28 May 2025 05:45  Ca    9.1        27 May 2025 06:06  Ca    8.6        26 May 2025 18:00  Phos  3.8       27 May 2025 06:06  Mg     1.7       27 May 2025 06:06    TPro  6.9    /  Alb  3.8    /  TBili  0.7    /  DBili  x      /  AST  <3     /  ALT  15     /  AlkPhos  47     27 May 2025 06:06  TPro  6.3    /  Alb  3.4    /  TBili  0.6    /  DBili  x      /  AST  7      /  ALT  14     /  AlkPhos  42     26 May 2025 18:00   LIVER FUNCTIONS - ( 27 May 2025 06:06 )  Alb: 3.8 g/dL / Pro: 6.9 g/dL / ALK PHOS: 47 U/L / ALT: 15 U/L / AST: <3 U/L / GGT: x           Troponin I, High Sensitivity Result: 15.2 ng/L (05-26-25 @ 18:00)  D-Dimer Assay, Quantitative: <150 ng/mL DDU (05-26-25 @ 18:00)    12 Lead ECG:   Ventricular Rate 72 BPM    Atrial Rate 62 BPM    P-R Interval 132 ms    QRS Duration 80 ms    Q-T Interval 418 ms    QTC Calculation(Bazett) 457 ms    P Axis 83 degrees    R Axis -63 degrees    T Axis 63 degrees    Diagnosis Line Sinus rhythm with premature atrial complexes  Pulmonary disease pattern  Left anterior fascicular block  Abnormal ECG  When compared with ECG of 02-SEP-2023 11:31,  premature atrial complexes are now present  Confirmed by ROSALVA GOODRICH ([id number]) on 5/27/2025 6:48:02 PM (05-26-25 @ 17:20)       EXAM:  ECHO TTE W/O CON COMP W/DOPPLR           PROCEDURE DATE:  07/17/2016      INTERPRETATION:  Ordering Physician: LIA MOLINA    Indication: Syncope    Study Quality: Technically difficult and limited     Height: 167 cm  Weight: 91 kg  BSA: 2.0 sq m  Blood Pressure: 104/70    MEASUREMENTS  IVS: 1.4cm  PWT: 1.4cm  LA: 4.4cm  AO: 3.6cm  LVIDd: 4.6cm  LVIDs: 2.4cm    FINDINGS  Left Ventricle: The endocardium is not well-visualized. Grossly, there   appears to be normal left ventricular systolic function.  Aortic Valve: Calcified trileaflet aortic valve with normal opening. No   significant aortic insufficiency.  Mitral Valve: Mitral annular calcification and calcified mitral valve   leaflets. Mild mitral insufficiency.  Tricuspid Valve: Not well visualized. Grossly normal. Mild tricuspid   insufficiency.  Pulmonic Valve: Not well visualized. Mild pulmonic insufficiency.  Left Atrium: Enlarged  Right Ventricle: Normal right ventricular size and systolic function.  Right Atrium: Enlarged  Diastolic function: Grade 1 diastolic dysfunction.                   DAVID MELGOZA M.D., ATTENDING CARDIOLOGIST  This document has been electronically signed. Jul 18 2016  1:12P

## 2025-05-28 NOTE — DISCHARGE NOTE NURSING/CASE MANAGEMENT/SOCIAL WORK - FINANCIAL ASSISTANCE
[facility] provides services at a reduced cost to those who are determined to be eligible through [facility]’s financial assistance program. Information regarding [facility]’s financial assistance program can be found by going to [email or web address] or by calling [phone].

## 2025-05-28 NOTE — DISCHARGE NOTE NURSING/CASE MANAGEMENT/SOCIAL WORK - NSDCVIVACCINE_GEN_ALL_CORE_FT
Tdap; 23-Aug-2021 22:35; Danii Dubon (RN); [employer]; [id number] (Exp. Date: 01-Oct-2022); IntraMuscular; Deltoid Left.; 0.5 milliLiter(s); VIS (VIS Published: 09-May-2013, VIS Presented: 23-Aug-2021);

## 2025-05-28 NOTE — PROGRESS NOTE ADULT - ASSESSMENT
84F Hx HTN, HLD, DM, PAF, CKD, aplastic anemia presented w/ worsening SOB and b/l LE edema; admitted for likely HFpEF exacerbation    # SOB, leg edema  - SOB/SWAIN likely 2/2 HFpEF exacerbation w/ superimposed COPD exacerbation  - EKG NSR w/ PACs  - Remains volume up on exam, continue Lasix 40mg IV BID  - Repeat TTE pending  - Strict I&Os, daily weights, fluid restriction    # Afib  - Continue Eliquis, Amiodarone  - Monitor on telemetry    # Hypotension  - Continue Midodrine    # HLD  - Continue Simvastatin    - Monitor and replete lytes, keep K>4 and Mg >2  - Further cardiac workup will depend on clinical course.   - All other workup per primary team  Hussain Sal DNP, [facility]  Cardiology   Call Teams   Ynes Castellanos PA-C  Cardiology  Available on TEAMS

## 2025-05-28 NOTE — DISCHARGE NOTE NURSING/CASE MANAGEMENT/SOCIAL WORK - PATIENT PORTAL LINK FT
You can access the FollowMyHealth Patient Portal offered by [facility] by registering at the following website: [email or web address]. By joining [facility]’s FollowMyHealth portal, you will also be able to view your health information using other applications (apps) compatible with our system.

## 2025-05-28 NOTE — PROGRESS NOTE ADULT - SUBJECTIVE AND OBJECTIVE BOX
INTERVAL HPI/OVERNIGHT EVENTS:  Patient seen and examined at bedside. States she feels about the same as yesterday. States she still gets dyspnea on exertion and would like to come off supplemental O2 as she does not usually use it at home (however does have the home O2 set up). Patient denies any chest pain, palpitations, lightheadedness/dizziness. States she has productive cough, but states that is at baseline.    ROS: All other review of systems is negative unless indicated above.    MEDICATIONS  (STANDING):  albuterol    0.083% 2.5 milliGRAM(s) Nebulizer every 6 hours  albuterol    90 MICROgram(s) HFA Inhaler 1 Puff(s) Inhalation every 4 hours  aMIOdarone    Tablet 100 milliGRAM(s) Oral daily  apixaban 2.5 milliGRAM(s) Oral every 12 hours  atorvastatin 10 milliGRAM(s) Oral at bedtime  fluticasone propionate/ salmeterol 250-50 MICROgram(s) Diskus 1 Dose(s) Inhalation two times a day  gabapentin 400 milliGRAM(s) Oral two times a day  leflunomide 10 milliGRAM(s) Oral daily  midodrine. 10 milliGRAM(s) Oral <User Schedule>  pantoprazole    Tablet 40 milliGRAM(s) Oral before breakfast  predniSONE   Tablet 50 milliGRAM(s) Oral daily  tiotropium 2.5 MICROgram(s) Inhaler 2 Puff(s) Inhalation daily  tiZANidine 2 milliGRAM(s) Oral at bedtime  torsemide 20 milliGRAM(s) Oral daily    MEDICATIONS  (PRN):  albuterol/ipratropium for Nebulization 3 milliLiter(s) Nebulizer every 6 hours PRN Shortness of Breath and/or Wheezing  dicyclomine 10 milliGRAM(s) Oral two times a day before meals PRN bowel irritability  melatonin 3 milliGRAM(s) Oral at bedtime PRN Insomnia      Allergies    No Known Allergies    Intolerances            Vital Signs Last 24 Hrs  T(C): 36.4 (28 May 2025 04:45), Max: 37.6 (27 May 2025 20:14)  T(F): 97.6 (28 May 2025 04:45), Max: 99.6 (27 May 2025 20:14)  HR: 69 (28 May 2025 04:45) (64 - 72)  BP: 100/58 (28 May 2025 04:45) (100/58 - 120/80)  BP(mean): --  RR: 18 (28 May 2025 04:45) (18 - 18)  SpO2: 94% (28 May 2025 04:45) (94% - 100%)    Parameters below as of 28 May 2025 04:45  Patient On (Oxygen Delivery Method): nasal cannula  O2 Flow (L/min): 4      05-27 @ 07:01  -  05-28 @ 07:00  --------------------------------------------------------  IN: 0 mL / OUT: 1100 mL / NET: -1100 mL        Physical Exam:  General: laying comfortably in bed, NAD, on 2L NC  Neurology: A&Ox3, nonfocal, ORTEGA x 4  Respiratory: +mild bibasilar crackles  CV: RRR, S1S2  Abdominal: Soft, NT, ND +BS  Extremities: no edema B/L LE, + peripheral pulses      LABS:                        8.9    2.26  )-----------( 135      ( 28 May 2025 05:45 )             27.7     05-28    141  |  100  |  41[H]  ----------------------------<  105[H]  4.6   |  33[H]  |  1.30    Ca    9.3      28 May 2025 05:45  Phos  3.8     05-27  Mg     1.7     05-27    TPro  6.9  /  Alb  3.8  /  TBili  0.7  /  DBili  x   /  AST  <3[L]  /  ALT  15  /  AlkPhos  47  05-27      Urinalysis Basic - ( 28 May 2025 05:45 )    Color: x / Appearance: x / SG: x / pH: x  Gluc: 105 mg/dL / Ketone: x  / Bili: x / Urobili: x   Blood: x / Protein: x / Nitrite: x   Leuk Esterase: x / RBC: x / WBC x   Sq Epi: x / Non Sq Epi: x / Bacteria: x        RADIOLOGY & ADDITIONAL TESTS:

## 2025-05-28 NOTE — PROGRESS NOTE ADULT - SUBJECTIVE AND OBJECTIVE BOX
CHIEF COMPLAINT/ REASON FOR VISIT  .. Patient was seen to address the  issue listed under PROBLEM LIST which is located toward bottom of this note     MARTINEZ PATEL 2EAS 201 D1    Allergies    No Known Allergies    Intolerances        PAST MEDICAL & SURGICAL HISTORY:  COPD (chronic obstructive pulmonary disease)      DM (diabetes mellitus)  diet-controlled      PAF (paroxysmal atrial fibrillation)  s/p ablation      Hiatal hernia      H/O aplastic anemia      History of IBS      H/O osteoporosis      HLD (hyperlipidemia)      PMR (polymyalgia rheumatica)      History of basal cell cancer      Chronic kidney disease (CKD)      Hypotension      Presence of IVC filter      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      History of immunodeficiency      Lumbar compression fracture      H/O hiatal hernia      H/O pulmonary fibrosis      H/O sinus bradycardia      History of fracture of right hip  "unoperable"      S/P hysterectomy      S/P foot surgery      S/P knee surgery      After cataract  bilateral eye cataract surgically removed      Closed right hip fracture  rigth hip ORIF july 19 2016      S/P IVC filter  nov 2016      S/P carpal tunnel release  Right 2008 & 6/27/17      H/O kyphoplasty      Port-A-Cath in place  right chest          FAMILY HISTORY:  Family history of bladder cancer (Mother)    Family history of myocardial infarction (Father)    FH: atrial fibrillation (Father)        Home Medications:  amiodarone 200 mg oral tablet: 0.5 tab(s) orally once a day (27 May 2025 13:21)  Bentyl 10 mg oral capsule: 1 cap(s) orally 2 times a day, As Needed (27 May 2025 13:22)  Eliquis 2.5 mg oral tablet: 1 tab(s) orally 2 times a day (27 May 2025 12:39)  esomeprazole 20 mg oral delayed release capsule: 1 cap(s) orally once a day (27 May 2025 13:23)  folic acid 1 mg oral tablet: 1 tab(s) orally once a day (in the morning) (27 May 2025 12:39)  gabapentin 400 mg oral capsule: 1 cap(s) orally 2 times a day (27 May 2025 12:39)  IVIG monthly:  (27 May 2025 12:39)  leflunomide 10 mg oral tablet: 1 tab(s) orally once a day (27 May 2025 13:23)  midodrine 5 mg oral tablet: 2 tab(s) orally in the morning and 1 tab orally in the evening (27 May 2025 13:23)  montelukast 10 mg oral tablet: 1 tab(s) orally once a day (27 May 2025 13:23)  predniSONE 5 mg oral tablet: 1 tab(s) orally once a day (27 May 2025 13:22)  Procrit 10,000 units/mL preservative-free injectable solution: injectable once a week WEDNESDAY (27 May 2025 12:39)  Reclast Infusion , IV x 1 yearly:  (27 May 2025 13:22)  simvastatin 10 mg oral tablet: 1 tab(s) orally once a day (at bedtime) (27 May 2025 12:39)  tiZANidine: 2 tab(s) orally once a day (at bedtime) as needed for  muscle spasm (27 May 2025 13:22)  torsemide 20 mg oral tablet: 1 tab(s) orally once a day (in the morning) (27 May 2025 12:39)      MEDICATIONS  (STANDING):  aMIOdarone    Tablet 100 milliGRAM(s) Oral daily  apixaban 2.5 milliGRAM(s) Oral every 12 hours  atorvastatin 10 milliGRAM(s) Oral at bedtime  fluticasone propionate/ salmeterol 250-50 MICROgram(s) Diskus 1 Dose(s) Inhalation two times a day  furosemide   Injectable 40 milliGRAM(s) IV Push two times a day  gabapentin 400 milliGRAM(s) Oral two times a day  leflunomide 10 milliGRAM(s) Oral daily  midodrine. 10 milliGRAM(s) Oral <User Schedule>  pantoprazole    Tablet 40 milliGRAM(s) Oral before breakfast  predniSONE   Tablet 50 milliGRAM(s) Oral daily  tiotropium 2.5 MICROgram(s) Inhaler 2 Puff(s) Inhalation daily  tiZANidine 2 milliGRAM(s) Oral at bedtime    MEDICATIONS  (PRN):  albuterol/ipratropium for Nebulization 3 milliLiter(s) Nebulizer every 6 hours PRN Shortness of Breath and/or Wheezing  dicyclomine 10 milliGRAM(s) Oral two times a day before meals PRN bowel irritability  melatonin 3 milliGRAM(s) Oral at bedtime PRN Insomnia      Diet, Regular:   DASH/TLC Sodium & Cholesterol Restricted  2000mL Fluid Restriction ([id number]) (05-26-25 @ 23:27) [Active]          Vital Signs Last 24 Hrs  T(C): 36.4 (28 May 2025 04:45), Max: 37.6 (27 May 2025 20:14)  T(F): 97.6 (28 May 2025 04:45), Max: 99.6 (27 May 2025 20:14)  HR: 69 (28 May 2025 04:45) (64 - 72)  BP: 100/58 (28 May 2025 04:45) (100/58 - 124/78)  BP(mean): --  RR: 18 (28 May 2025 04:45) (18 - 18)  SpO2: 94% (28 May 2025 04:45) (94% - 100%)    Parameters below as of 28 May 2025 04:45  Patient On (Oxygen Delivery Method): nasal cannula  O2 Flow (L/min): 4        05-27-25 @ 07:01  -  05-28-25 @ 07:00  --------------------------------------------------------  IN: 0 mL / OUT: 1100 mL / NET: -1100 mL              LABS:                        8.9    2.26  )-----------( 135      ( 28 May 2025 05:45 )             27.7     05-28    141  |  100  |  41[H]  ----------------------------<  105[H]  4.6   |  33[H]  |  1.30    Ca    9.3      28 May 2025 05:45  Phos  3.8     05-27  Mg     1.7     05-27    TPro  6.9  /  Alb  3.8  /  TBili  0.7  /  DBili  x   /  AST  <3[L]  /  ALT  15  /  AlkPhos  47  05-27      Urinalysis Basic - ( 28 May 2025 05:45 )    Color: x / Appearance: x / SG: x / pH: x  Gluc: 105 mg/dL / Ketone: x  / Bili: x / Urobili: x   Blood: x / Protein: x / Nitrite: x   Leuk Esterase: x / RBC: x / WBC x   Sq Epi: x / Non Sq Epi: x / Bacteria: x            WBC:  WBC Count: 2.26 K/uL (05-28 @ 05:45)  WBC Count: 1.71 K/uL (05-27 @ 06:06)  WBC Count: 3.08 K/uL (05-26 @ 18:00)      MICROBIOLOGY:  RECENT CULTURES:                  Sodium:  Sodium: 141 mmol/L (05-28 @ 05:45)  Sodium: 139 mmol/L (05-27 @ 06:06)  Sodium: 140 mmol/L (05-26 @ 18:00)      1.30 mg/dL 05-28 @ 05:45  1.20 mg/dL 05-27 @ 06:06  1.20 mg/dL 05-26 @ 18:00      Hemoglobin:  Hemoglobin: 8.9 g/dL (05-28 @ 05:45)  Hemoglobin: 10.0 g/dL (05-27 @ 06:06)  Hemoglobin: 8.9 g/dL (05-26 @ 18:00)      Platelets: Platelet Count - Automated: 135 K/uL (05-28 @ 05:45)  Platelet Count - Automated: 170 K/uL (05-27 @ 06:06)  Platelet Count - Automated: 156 K/uL (05-26 @ 18:00)      LIVER FUNCTIONS - ( 27 May 2025 06:06 )  Alb: 3.8 g/dL / Pro: 6.9 g/dL / ALK PHOS: 47 U/L / ALT: 15 U/L / AST: <3 U/L / GGT: x             Urinalysis Basic - ( 28 May 2025 05:45 )    Color: x / Appearance: x / SG: x / pH: x  Gluc: 105 mg/dL / Ketone: x  / Bili: x / Urobili: x   Blood: x / Protein: x / Nitrite: x   Leuk Esterase: x / RBC: x / WBC x   Sq Epi: x / Non Sq Epi: x / Bacteria: x        RADIOLOGY & ADDITIONAL STUDIES:      MICROBIOLOGY:  RECENT CULTURES:

## 2025-05-28 NOTE — DISCHARGE NOTE NURSING/CASE MANAGEMENT/SOCIAL WORK - NSDCFUADDAPPT_GEN_ALL_CORE_FT
It is advisable to follow up with your primary care provider within the next 1 week. You / your daughter Lenore have stated you will make your own follow up appointments and she will assist as necessary.

## 2025-05-28 NOTE — PROGRESS NOTE ADULT - ASSESSMENT
83 y/o F with PMH AFon Eliquis, COPD, CKD, aplastic anemia, PMR (chronic prednisone with AVN hip), HLD, HTN, DM who p/w worsening SOB over the past few days admitted for likely CHF exacerbation.

## 2025-05-28 NOTE — DISCHARGE NOTE NURSING/CASE MANAGEMENT/SOCIAL WORK - NSSCCARECORD_GEN_ALL_CORE
Home Care Agency/Durable Medical Equipment Agency Home Care Agency/Durable Medical Equipment Agency/Community [facility]

## 2025-05-28 NOTE — CASE MANAGEMENT PROGRESS NOTE - NSCMPROGRESSNOTE_GEN_ALL_CORE
CM met with pt and daughter Lenore. Pt gave verbal consent to speak w daughter at bedside. Patient is identified as a CMS STAR patient. Transition care management program explained and yellow contact card has been provided. Pt family requesting home health aide upon discharge. CM discussed home care services and the intermittent short term nature of service. CM discussed private hire aides and reviewed previously provided D/C resource folder which includes lists for both rehab facilities and home care agencies. Pt and family agreeable to services pending recommendations. Pt chose [facility] [phone], referral sent pending acceptance. Pt request commode, DME referral sent to [facility] [phone], pending insurance verification. CM to remain available.

## 2025-05-28 NOTE — DISCHARGE NOTE NURSING/CASE MANAGEMENT/SOCIAL WORK - NSDCDMENAME_GEN_ALL_CORE_FT
Per pt DME in home rolling walker, WC, home o2,  Per pt DME in home rolling walker, WC, home o2, and commode

## 2025-05-28 NOTE — PROGRESS NOTE ADULT - ASSESSMENT
REASON FOR VISIT  .. Management of problems listed below      EVENTS/CURRENT ISSUES.  . 5/28/2025 lasix changed to torsemide   . 5/27/2025 being rxed for copd chf   . 5/27/2025 83 y/o F with PMH AFon Eliquis, COPD (not on home o2), CKD, aplastic anemia, PMR (chronic prednisone with AVN hip aw sob wheeze         REVIEW OF SYMPTOMS   Able to give ROS  Yes     RELIABILITY +/-   CONSTITUTIONAL Weakness Yes    ENDOCRINE  No heat or cold intolerance    ALLERGY No hives  Sore throat No stridor  RESP Shortness of breath YES   NEURO New weakness No   CARDIAC   Palpitations No         PHYSICAL EXAM    HEENT Unremarkable  atraumatic   RESP Fair air entry  Harsh breath sound   CARDIAC S1 S2 No S3     NO JVD    ABDOMEN No hepatosplenomegaly   PEDAL EDEMA present No calf tenderness  NO rash     REASON FOR VISIT  .. Management of problems listed below      CC.   . 5/26/2025 c/o shortness of breath- was 87% on room air- patient was wheezing- poatient given 1 albuterol treatment by ems- on 4 litres patient saturating at 96%  shortness of breath  OVERALL PRESENTATION.  . 5/26/2025 83 y/o F with PMH AFon Eliquis, COPD (not on home o2), CKD, aplastic anemia, PMR (chronic prednisone with AVN hip), HLD, HTN, DM who p/w worsening SOB over the past two days, denies CP/ palpitations, has home O2 setup (but doesn't use) but put it on after noticing SpO2 in the 80's, which improved SOB. Endorses worsening SWAIN, denies significant wheezing, cough, recent illness med changes, or changes in diet. Adherent with home meds.    Denies fever, chills, chest pain, palpitations, cough, abdominal pain, nausea, vomiting, diarrhea, constipation, urinary frequency, urgency, or dysuria, headaches, changes in vision, dizziness, numbness, tingling.    ED Course:   Vitals: BP: 130/77, HR: 77, Temp: 98, RR: 26, SpO2: 96% on 4L NC  Labs: BNP 2185, trop WNL, WBC: 3.08 (around baseline), Hb 8.9 () (around baseline), HCO3- 32,  RVP negative,     CXR: linear interstitial prominence in b/l bases, trace pleural fluid, potential cardiogenic pulmonary edema, medi-port tip at cavoatrial junction, as per personal read, official read pending   B/L LE Dopplers: No evidence of DVT  EKG: NSR PACs  Received in the ED: lasix 20mg IV, solumedrol 125mg IV, duonebs  PMH.      PAST HOSPITAL STAYS .  Home Medications:   * No Current Medications as of 05-May-2025 09:54 documented in Structured Notes  · folic acid 1 mg oral tablet: 1 tab(s) orally once a day (in the morning)  · midodrine 5 mg oral tablet: 1 tablet orally 3 times a day  · torsemide 20 mg oral tablet: 1 tab(s) orally once a day (in the morning)  · gabapentin 400 mg oral capsule: 1 cap(s) orally 2 times a day  · tiZANidine: 2 tab(s) orally once a day (at bedtime)  · Singulair 10 mg oral tablet: 1 tab(s) orally once a day (in the morning)  · Bentyl 10 mg oral capsule: 1 cap(s) orally 2 times a day, As Needed  · Leflunomide: once a day (after a meal)  · NexIUM 20 mg oral delayed release capsule: 1 cap(s) orally once a day (in the morning)  · simvastatin 10 mg oral tablet: 1 tab(s) orally once a day (at bedtime)  · Reclast Infusion , IV x 1 yearly:   · Vitamin D3 , 1 tablet by mouth 3x a week:   · IVIG monthly:   · amiodarone 100 mg oral tablet: 1 tab(s) orally once a day (in the morning)  · oxyCODONE 5 mg oral tablet: 1 tab(s) orally prn  · Eliquis 2.5 mg oral tablet: 1 tab(s) orally 2 times a day  · Procrit 10,000   ER MGMT .      BEST PRACTICE ISSUES.  . HOB ELEVATN.    .... Yes  . DIET  .   .... DASH 2L FR 5/26  . FREE WATER.   ....   .  IV FLUID.  .....   . PHARMAC DVT PPLX .    .... APIXABA 5/26 a 2.5 x 2 (nv af)   . NON PHARMAC DVT PPLX .      . STRESS ULCR PPLX .   .... PROTONIX 40 5/27/2025  . DATE/DM MGMT.   ..... See under Endocrine section   GENERAL DATA .   . COVID.         .... scv2 5/27/2025 (-)   . GOC.    ....    . ICU STAY.    .... no   . INFECTION PPLX .   ....   . ALLGY.   ....  nka  . WT.   .... 5/27/2025 64  . BMI.  .... 5/27/2025 25       XXXXXXXXXXXXXXXXXXXXXXX  VITALS/GAS EXCHANGE/DRIPS    ABGS.     .  VS/ PO/IO/ VENT/ DRIPS.  5/28/2025 afeb 70 100/59  5/28/2025 2liter 96%     XXXXXXXXXXXXXXXXXXXXXXXXXXXXXXXXXXX  PROBLEM ASSESSMENT RECOMMENDATIONS.  RESP.   . GAS EXCHANGE .   .... target PO 90-95%   .... 5/28/2025 check abg to see if shes retaining co2     . SOB  .... DD COPD CHF     . RO VTE  .... venous duplx 5/26 (-)    . COPD  .... PREDNISONE 50 5/27/2025 X 5D   .... DUONEB.4 P 5/27/2025  .... ALBUTEROL.4 5/27  .... ADVAIR 250 5/27   .... SPIRIVA 5/27    INFECTION.  . DATA  .... w 5/26-5/27 W 3 -  1.7   .... pr 5/27/2025 pr .29   .... cxr 5/26 napd   .... ct ch 5/27/2025 cw 9/2/2023   ........ mild tracheobr secretns   ........ mosaic attenuation   ........ bl nodularity similar   ....... scattered linear and dependent atelectasis   ....... sm bl effsns   ....... mod indeterminate t8 veretebral body fr new   .... rvp 5/26/2025 rvp (-)   .... no obvious active infectn      CARDIAC.  . ORTHOSTATIC HYPOTENSION  .... has v significant orthostatic hypotension   .... MIDODRINE 10.2 5/26    . CAD    .... Trop 5/27/2025 tr 15       . CHF  .... 5/27/2025  1 + p edema  .... pbnp 5/27/2025 pbnp 2185  .... tte 4/2025 mild ph  n vf   .... lasix   ........ 5/26 l 40.2   ....... 5/28/2025 l dced   .... torsemide 5/28/2025 t 20   .... check tte     . A fib   .... APIXABA 5/26 a 2.5 x 2   .... AMIODARONE 5/27/2025 5/26 A 100    HEMAT.  .... Plt 5/27/2025 plt 170   ....  monitor    . IMMUNOSUPPRESSIVE  .... LEFLUNAMIDE 10 5/27/2025    . ANEMIA  .... Hb 5/27-5/28/2025 HB 10- 8.9   .... fa 5/27/2025 fa > 20   .... b12 5/27/2025 b12 1444   .... target hb 7 (+)    . LEUKOPENIA   .... w 5/26-5/27-5/28 W 3 -  1.7 - 2.2    GI.   . DIET .   .... dash 2 liter fr 5/26    RENAL.  . DATA  .... Na 5/27/2025 Na 139   .... CO2 5/27/2025 co2 30   .... Cr 5/27-5/28/2025 Cr 1.2 - 1.3   .... monitor    . HYPERKALEMIA   .... K 5/27-5/28/2025 K 5.7 - 4.6     NEURO.  . PAIN  .... TIZANADINE 5/26 T 2 HS     XXXXXXXXXXXXXXXXXXXXXX   SUMMARY BASELINE .     . 83 y/o F with PMH AFon Eliquis, COPD (not on home o2), CKD, aplastic anemia, PMR (chronic prednisone with AVN hip), HLD, HTN, DM pt of Dr Gallegos not on home ox or home cpap used to use [employer]y lives with spouse quit 40 y 1/2 ppd  CC.   . 5/26/2025 SHORTNESS OF BREATH   . 5/26/2026 HYPOXEMIA   . 5/26/2025 WHEEZE   MAIN ISSUES.  . HYPOXIA 5/26/2025  . SOB  .... DD COPD CHF   .... venous duplx 5/26 (-)  . COPD ex   . A fib   .... APIXABA 5/26 a 2.5 x 2  . CHF  .... pbnp 5/27/2025 pbnp 2185  . ANEMIA  .... Hb 5/27/2025 HB 10  . LEUKOPENIA   .... w 5/26-5/27 W 3 -  1.7     PMH.   . AFon Eliquis,   . COPD (not on home o2),   . CKD,   . aplastic anemia,   . PMR (chronic prednisone with AVN hip),   . HLD,   . HTN,   . DM    PROCEDURES/DEVICES .      DISCUSSIONS.  .... Discussed with primary care and relevant consultants on an ongoing basis       TIME SPENT.  . Over 36 minutes aggregate care time spent on encounter; activities included   direct patient care, counseling and/or coordinating care reviewing notes, lab data/ imaging , discussion with multidisciplinary team/ patient  /family and explaining in detail risks, benefits, alternatives  of the recommendations     ROBERT HENRIQUEZ 84 5/27/2025 1942

## 2025-05-28 NOTE — CASE MANAGEMENT PROGRESS NOTE - NSCMPROGRESSNOTE_GEN_ALL_CORE
Discussed pt on rounds, pt remains acute, awaiting further testing, on o24LNC, has home O2, c/o dyspnea, CMS Star, IV Lasix, sent referral for commode to [facility] [phone], sent referral to [facility] [phone] per pt choice. CM will continue to collaborate with interdisciplinary team and remain available to assist.

## 2025-05-29 DIAGNOSIS — D46.Z OTHER MYELODYSPLASTIC SYNDROMES: ICD-10-CM

## 2025-05-29 LAB
ANION GAP SERPL CALC-SCNC: 6 MMOL/L — SIGNIFICANT CHANGE UP (ref 5–17)
BUN SERPL-MCNC: 47 MG/DL — HIGH (ref 7–23)
CALCIUM SERPL-MCNC: 9.3 MG/DL — SIGNIFICANT CHANGE UP (ref 8.5–10.1)
CHLORIDE SERPL-SCNC: 97 MMOL/L — SIGNIFICANT CHANGE UP (ref 96–108)
CO2 SERPL-SCNC: 35 MMOL/L — HIGH (ref 22–31)
CREAT SERPL-MCNC: 1.3 MG/DL — SIGNIFICANT CHANGE UP (ref 0.5–1.3)
EGFR: 41 ML/MIN/1.73M2 — LOW
EGFR: 41 ML/MIN/1.73M2 — LOW
GLUCOSE SERPL-MCNC: 111 MG/DL — HIGH (ref 70–99)
HCT VFR BLD CALC: 28.3 % — LOW (ref 34.5–45)
HGB BLD-MCNC: 8.9 G/DL — LOW (ref 11.5–15.5)
MCHC RBC-ENTMCNC: 31.3 PG — SIGNIFICANT CHANGE UP (ref 27–34)
MCHC RBC-ENTMCNC: 31.4 G/DL — LOW (ref 32–36)
MCV RBC AUTO: 99.6 FL — SIGNIFICANT CHANGE UP (ref 80–100)
NRBC BLD AUTO-RTO: 1 /100 WBCS — HIGH (ref 0–0)
PLATELET # BLD AUTO: 146 K/UL — LOW (ref 150–400)
POTASSIUM SERPL-MCNC: 4.5 MMOL/L — SIGNIFICANT CHANGE UP (ref 3.5–5.3)
POTASSIUM SERPL-SCNC: 4.5 MMOL/L — SIGNIFICANT CHANGE UP (ref 3.5–5.3)
RBC # BLD: 2.84 M/UL — LOW (ref 3.8–5.2)
RBC # FLD: 24 % — HIGH (ref 10.3–14.5)
SODIUM SERPL-SCNC: 138 MMOL/L — SIGNIFICANT CHANGE UP (ref 135–145)
WBC # BLD: 3.21 K/UL — LOW (ref 3.8–10.5)
WBC # FLD AUTO: 3.21 K/UL — LOW (ref 3.8–10.5)

## 2025-05-29 PROCEDURE — 99233 SBSQ HOSP IP/OBS HIGH 50: CPT

## 2025-05-29 RX ORDER — METHYLPREDNISOLONE ACETATE 80 MG/ML
40 INJECTION, SUSPENSION INTRA-ARTICULAR; INTRALESIONAL; INTRAMUSCULAR; SOFT TISSUE EVERY 12 HOURS
Refills: 0 | Status: DISCONTINUED | OUTPATIENT
Start: 2025-05-29 | End: 2025-05-31

## 2025-05-29 RX ADMIN — GABAPENTIN 400 MILLIGRAM(S): 400 CAPSULE ORAL at 17:11

## 2025-05-29 RX ADMIN — ATORVASTATIN CALCIUM 10 MILLIGRAM(S): 80 TABLET, FILM COATED ORAL at 22:32

## 2025-05-29 RX ADMIN — Medication 40 MILLIGRAM(S): at 05:54

## 2025-05-29 RX ADMIN — MIDODRINE HYDROCHLORIDE 10 MILLIGRAM(S): 5 TABLET ORAL at 18:00

## 2025-05-29 RX ADMIN — TIZANIDINE 2 MILLIGRAM(S): 4 TABLET ORAL at 22:32

## 2025-05-29 RX ADMIN — Medication 2.5 MILLIGRAM(S): at 01:30

## 2025-05-29 RX ADMIN — Medication 2.5 MILLIGRAM(S): at 07:22

## 2025-05-29 RX ADMIN — APIXABAN 2.5 MILLIGRAM(S): 2.5 TABLET, FILM COATED ORAL at 17:11

## 2025-05-29 RX ADMIN — Medication 2.5 MILLIGRAM(S): at 19:11

## 2025-05-29 RX ADMIN — Medication 1 DOSE(S): at 05:55

## 2025-05-29 RX ADMIN — GABAPENTIN 400 MILLIGRAM(S): 400 CAPSULE ORAL at 05:54

## 2025-05-29 RX ADMIN — TIOTROPIUM BROMIDE INHALATION SPRAY 2 PUFF(S): 3.12 SPRAY, METERED RESPIRATORY (INHALATION) at 05:55

## 2025-05-29 RX ADMIN — MIDODRINE HYDROCHLORIDE 10 MILLIGRAM(S): 5 TABLET ORAL at 05:54

## 2025-05-29 RX ADMIN — AMIODARONE HYDROCHLORIDE 100 MILLIGRAM(S): 50 INJECTION, SOLUTION INTRAVENOUS at 05:55

## 2025-05-29 RX ADMIN — Medication 20 MILLIGRAM(S): at 05:54

## 2025-05-29 RX ADMIN — Medication 2.5 MILLIGRAM(S): at 13:46

## 2025-05-29 RX ADMIN — APIXABAN 2.5 MILLIGRAM(S): 2.5 TABLET, FILM COATED ORAL at 05:55

## 2025-05-29 RX ADMIN — Medication 4 MILLILITER(S): at 19:12

## 2025-05-29 RX ADMIN — LEFLUNOMIDE 10 MILLIGRAM(S): 10 TABLET ORAL at 17:17

## 2025-05-29 RX ADMIN — PREDNISONE 50 MILLIGRAM(S): 20 TABLET ORAL at 05:54

## 2025-05-29 RX ADMIN — Medication 1 DOSE(S): at 22:36

## 2025-05-29 RX ADMIN — METHYLPREDNISOLONE ACETATE 40 MILLIGRAM(S): 80 INJECTION, SUSPENSION INTRA-ARTICULAR; INTRALESIONAL; INTRAMUSCULAR; SOFT TISSUE at 18:00

## 2025-05-29 NOTE — CONSULT NOTE ADULT - ASSESSMENT
Myelodysplasia who is transfusion and procrit dependent now admitted with COPD exacerbation    Recommendations:  1.  follow CBC and transfuse as indicated  2.  continue cardiopulmonary evaluation   3.  prognosis guarded  4.  further heme onc recommendations pending above

## 2025-05-29 NOTE — CASE MANAGEMENT PROGRESS NOTE - NSCMPROGRESSNOTE_GEN_ALL_CORE
CM recd message from [employer] [phone] representative "SAME & SIM ON FILE FOR THE ITEM COMMODE  LAST BILLED ON 10/17/2023 FROM [employer] [facility] Allowed one every 5 years $75.00" Pt informed and declined need for commode. Pt stated she does have one at home. CM to remain available

## 2025-05-29 NOTE — PROGRESS NOTE ADULT - ASSESSMENT
REASON FOR VISIT  .. Management of problems listed below      EVENTS/CURRENT ISSUES.  .... 5/29/2025 pred changed solumed   .... 5/28/2025 lasix changed to torsemide   .... 5/27/2025 being rxed for copd chf   .... 5/27/2025 85 y/o F with PMH AFon Eliquis, COPD (not on home o2), CKD, aplastic anemia, PMR (chronic prednisone with AVN hip aw sob wheeze         REVIEW OF SYMPTOMS   Able to give ROS  Yes     RELIABILITY +/-   CONSTITUTIONAL Weakness Yes    ENDOCRINE  No heat or cold intolerance    ALLERGY No hives  Sore throat No stridor  RESP Shortness of breath YES   NEURO New weakness No   CARDIAC   Palpitations No         PHYSICAL EXAM    HEENT Unremarkable  atraumatic   RESP Fair air entry  Harsh breath sound   CARDIAC S1 S2 No S3     NO JVD    ABDOMEN No hepatosplenomegaly   PEDAL EDEMA present No calf tenderness  NO rash     REASON FOR VISIT  .. Management of problems listed below      CC.   . 5/26/2025 c/o shortness of breath- was 87% on room air- patient was wheezing- poatient given 1 albuterol treatment by ems- on 4 litres patient saturating at 96%  shortness of breath  OVERALL PRESENTATION.  . 5/26/2025 85 y/o F with PMH AFon Eliquis, COPD (not on home o2), CKD, aplastic anemia, PMR (chronic prednisone with AVN hip), HLD, HTN, DM who p/w worsening SOB over the past two days, denies CP/ palpitations, has home O2 setup (but doesn't use) but put it on after noticing SpO2 in the 80's, which improved SOB. Endorses worsening SWAIN, denies significant wheezing, cough, recent illness med changes, or changes in diet. Adherent with home meds.    Denies fever, chills, chest pain, palpitations, cough, abdominal pain, nausea, vomiting, diarrhea, constipation, urinary frequency, urgency, or dysuria, headaches, changes in vision, dizziness, numbness, tingling.    ED Course:   Vitals: BP: 130/77, HR: 77, Temp: 98, RR: 26, SpO2: 96% on 4L NC  Labs: BNP 2185, trop WNL, WBC: 3.08 (around baseline), Hb 8.9 () (around baseline), HCO3- 32,  RVP negative,     CXR: linear interstitial prominence in b/l bases, trace pleural fluid, potential cardiogenic pulmonary edema, medi-port tip at cavoatrial junction, as per personal read, official read pending   B/L LE Dopplers: No evidence of DVT  EKG: NSR PACs  Received in the ED: lasix 20mg IV, solumedrol 125mg IV, duonebs  PMH.      PAST HOSPITAL STAYS .  Home Medications:   * No Current Medications as of 05-May-2025 09:54 documented in Structured Notes  · folic acid 1 mg oral tablet: 1 tab(s) orally once a day (in the morning)  · midodrine 5 mg oral tablet: 1 tablet orally 3 times a day  · torsemide 20 mg oral tablet: 1 tab(s) orally once a day (in the morning)  · gabapentin 400 mg oral capsule: 1 cap(s) orally 2 times a day  · tiZANidine: 2 tab(s) orally once a day (at bedtime)  · Singulair 10 mg oral tablet: 1 tab(s) orally once a day (in the morning)  · Bentyl 10 mg oral capsule: 1 cap(s) orally 2 times a day, As Needed  · Leflunomide: once a day (after a meal)  · NexIUM 20 mg oral delayed release capsule: 1 cap(s) orally once a day (in the morning)  · simvastatin 10 mg oral tablet: 1 tab(s) orally once a day (at bedtime)  · Reclast Infusion , IV x 1 yearly:   · Vitamin D3 , 1 tablet by mouth 3x a week:   · IVIG monthly:   · amiodarone 100 mg oral tablet: 1 tab(s) orally once a day (in the morning)  · oxyCODONE 5 mg oral tablet: 1 tab(s) orally prn  · Eliquis 2.5 mg oral tablet: 1 tab(s) orally 2 times a day  · Procrit 10,000   ER MGMT .      BEST PRACTICE ISSUES.  . HOB ELEVATN.    .... Yes  . DIET  .   .... DASH 2L FR 5/26  . FREE WATER.   ....   .  IV FLUID.  .....   . PHARMAC DVT PPLX .    .... APIXABA 5/26 a 2.5 x 2 (nv af)   . NON PHARMAC DVT PPLX .      . STRESS ULCR PPLX .   .... PROTONIX 40 5/27/2025  . DATE/DM MGMT.   ..... See under Endocrine section   GENERAL DATA .   . COVID.         .... scv2 5/27/2025 (-)   . GOC.    ....    . ICU STAY.    .... no   . INFECTION PPLX .   ....   . ALLGY.   ....  nka  . WT.   .... 5/27/2025 64  . BMI.  .... 5/27/2025 25       XXXXXXXXXXXXXXXXXXXXXXX  VITALS/GAS EXCHANGE/DRIPS    ABGS.   . 5/27 declined ordered abg   .  VS/ PO/IO/ VENT/ DRIPS.  5/29/2025 afeb 59 100/50   5/29/2025 2l 98%     XXXXXXXXXXXXXXXXXXXXXXXXXXXXXXXXXXX  PROBLEM ASSESSMENT RECOMMENDATIONS.  RESP.   . GAS EXCHANGE .   .... target PO 90-95%   .... 5/28/2025 check abg to see if shes retaining co2     . SOB  .... DD COPD CHF     . RO VTE  .... venous duplx 5/26 (-)    . COPD  .... GIVEN PREDNISONE 50 5/27-5/29/2025 X 5D  .... SOLU 40.2 5/29/2025   .... DUONEB.4 P 5/27/2025  .... ALBUTEROL.4 5/27  .... ADVAIR 250 5/27   .... SPIRIVA 5/27    INFECTION.  . DATA  .... w 5/26-5/27-5/29 W 3 -  1.7 - 3.2  .... pr 5/27/2025 pr .29   .... cxr 5/26 napd   .... ct ch 5/27/2025 cw 9/2/2023   ........ mild tracheobr secretns   ........ mosaic attenuation   ........ bl nodularity similar   ....... scattered linear and dependent atelectasis   ....... sm bl effsns   ....... mod indeterminate t8 veretebral body fr new   .... rvp 5/26/2025 rvp (-)   .... no obvious active infectn      CARDIAC.  . ORTHOSTATIC HYPOTENSION  .... has v significant orthostatic hypotension   .... MIDODRINE 10.2 5/26    . CAD    .... Trop 5/27/2025 tr 15       . CHF  .... 5/27/2025  1 + p edema  .... pbnp 5/27/2025 pbnp 2185  .... tte 4/2025 mild ph  n vf   .... lasix   ........ 5/26 l 40.2   ....... 5/28/2025 l dced   .... torsemide 5/28/2025 t 20   .... check tte     . A fib   .... APIXABA 5/26 a 2.5 x 2   .... AMIODARONE 5/27/2025 5/26 A 100    HEMAT.  .... Plt 5/27/2025 plt 170   ....  monitor    . IMMUNOSUPPRESSIVE  .... LEFLUNAMIDE 10 5/27/2025    . ANEMIA  .... Hb 5/27-5/28-5/9/2025 HB 10- 8.9- 8.9    .... fa 5/27/2025 fa > 20   .... b12 5/27/2025 b12 1444   .... target hb 7 (+)    . LEUKOPENIA   .... w 5/26-5/27-5/28 W 3 -  1.7 - 2.2    GI.   . DIET .   .... dash 2 liter fr 5/26    RENAL.  . DATA  .... Na 5/27/2025 Na 139   .... CO2 5/27/2025 co2 30   .... Cr 5/27-5/28-5/29/2025 Cr 1.2 - 1.3 - 1.3   .... monitor    . HYPERKALEMIA   .... K 5/27-5/28/2025 K 5.7 - 4.6     NEURO.  . PAIN  .... TIZANADINE 5/26 T 2 HS       XXXXXXXXXXXXXXXXXXXXXX   SUMMARY BASELINE .     . 85 y/o F with PMH AFon Eliquis, COPD (not on home o2), CKD, aplastic anemia, PMR (chronic prednisone with AVN hip), HLD, HTN, DM pt of Dr Gallegos not on home ox or home cpap used to use trelegy lives with spouse quit 40 y 1/2 ppd  CC.   . 5/26/2025 SHORTNESS OF BREATH   . 5/26/2026 HYPOXEMIA   . 5/26/2025 WHEEZE   MAIN ISSUES.  . HYPOXIA 5/26/2025  . SOB  .... DD COPD CHF   .... venous duplx 5/26 (-)  . COPD ex   . A fib   .... APIXABA 5/26 a 2.5 x 2  . CHF  .... pbnp 5/27/2025 pbnp 2185  . ANEMIA  .... Hb 5/27/2025 HB 10  . LEUKOPENIA   .... w 5/26-5/27 W 3 -  1.7     PMH.   . AFon Eliquis,   . COPD (not on home o2),   . CKD,   . aplastic anemia,   . PMR (chronic prednisone with AVN hip),   . HLD,   . HTN,   . DM    PROCEDURES/DEVICES .      DISCUSSIONS.  .... Discussed with primary care and relevant consultants on an ongoing basis       TIME SPENT.  . Over 36 minutes aggregate care time spent on encounter; activities included   direct patient care, counseling and/or coordinating care reviewing notes, lab data/ imaging , discussion with multidisciplinary team/ patient  /family and explaining in detail risks, benefits, alternatives  of the recommendations     ROBERT HENRIQUEZ 84 5/27/2025 1942

## 2025-05-29 NOTE — PROGRESS NOTE ADULT - ASSESSMENT
84F Hx HTN, HLD, DM, PAF, CKD, aplastic anemia presented w/ worsening SOB and b/l LE edema; admitted for likely HFpEF exacerbation    - Pt p/w shortness of breath, multifactorial in nature, likely this is secondary to a COPD  - She has chronic lower extremity edema.  - BNP:  <--2185  - S/p Lasix 40mg IV BID, now on Torsemide 20 daily   - Follow-up with pulmonary for COPD management.  - Repeat TTE pending  - Strict I&Os, daily weights, fluid restriction    - EKG NSR w/ PACs  - No evidence of any active ischemia   - Continue Simvastatin    - Known Afib  - TELE: Sr 50-60s PAC, nonsustained short PAT 130s  x 3   - Continue Eliquis, Amiodarone    - Has very significant orthostatic hypotension  - Would check orthostatics daily  - BP stable and controlled   - Continue Midodrine    - Monitor and replete lytes, keep K>4, Mg>2.  - Will continue to follow.    Oliver Olvera, MS FNP, AGACNP  Nurse Practitioner- Cardiology   Please call on TEAMS

## 2025-05-29 NOTE — PROGRESS NOTE ADULT - SUBJECTIVE AND OBJECTIVE BOX
INTERVAL HPI/OVERNIGHT EVENTS:  Patient seen and examined at bedside. States she is still not feeling back to her baseline. States she still gets dyspneic with exertion. Complains of productive cough. Denies any chest pain, palpitations, abd pain, N/V.    ROS: All other review of systems is negative unless indicated above.    MEDICATIONS  (STANDING):  albuterol    0.083% 2.5 milliGRAM(s) Nebulizer every 6 hours  albuterol    90 MICROgram(s) HFA Inhaler 1 Puff(s) Inhalation every 4 hours  aMIOdarone    Tablet 100 milliGRAM(s) Oral daily  apixaban 2.5 milliGRAM(s) Oral every 12 hours  atorvastatin 10 milliGRAM(s) Oral at bedtime  fluticasone propionate/ salmeterol 250-50 MICROgram(s) Diskus 1 Dose(s) Inhalation two times a day  gabapentin 400 milliGRAM(s) Oral two times a day  leflunomide 10 milliGRAM(s) Oral daily  methylPREDNISolone sodium succinate Injectable 40 milliGRAM(s) IV Push every 12 hours  midodrine. 10 milliGRAM(s) Oral <User Schedule>  pantoprazole    Tablet 40 milliGRAM(s) Oral before breakfast  tiotropium 2.5 MICROgram(s) Inhaler 2 Puff(s) Inhalation daily  tiZANidine 2 milliGRAM(s) Oral at bedtime  torsemide 20 milliGRAM(s) Oral daily    MEDICATIONS  (PRN):  albuterol/ipratropium for Nebulization 3 milliLiter(s) Nebulizer every 6 hours PRN Shortness of Breath and/or Wheezing  dicyclomine 10 milliGRAM(s) Oral two times a day before meals PRN bowel irritability  melatonin 3 milliGRAM(s) Oral at bedtime PRN Insomnia      Allergies    No Known Allergies    Intolerances              Vital Signs Last 24 Hrs  T(C): 36.8 (29 May 2025 11:26), Max: 36.8 (29 May 2025 11:26)  T(F): 98.2 (29 May 2025 11:26), Max: 98.2 (29 May 2025 11:26)  HR: 61 (29 May 2025 14:01) (58 - 70)  BP: 99/64 (29 May 2025 11:26) (99/64 - 110/-)  BP(mean): 59 (28 May 2025 20:34) (59 - 59)  RR: 18 (29 May 2025 11:26) (18 - 18)  SpO2: 96% (29 May 2025 14:01) (96% - 100%)    Parameters below as of 29 May 2025 14:01  Patient On (Oxygen Delivery Method): nasal cannula, 2 LPM        05-29 @ 07:01  -  05-29 @ 18:12  --------------------------------------------------------  IN: 0 mL / OUT: 900 mL / NET: -900 mL        Physical Exam:  General: laying comfortably in bed, NAD, on 2L NC  Neurology: A&Ox3, nonfocal, ORTEGA x 4  Respiratory: diminished breath sounds B/L lung fields, no crackles/wheezing noted  CV: +S1S2  Abdominal: Soft, NT, ND +BS  Extremities: no edema B/L LE, + peripheral pulses      LABS:                        8.9    3.21  )-----------( 146      ( 29 May 2025 07:50 )             28.3     05-29    138  |  97  |  47[H]  ----------------------------<  111[H]  4.5   |  35[H]  |  1.30    Ca    9.3      29 May 2025 07:50        Urinalysis Basic - ( 29 May 2025 07:50 )    Color: x / Appearance: x / SG: x / pH: x  Gluc: 111 mg/dL / Ketone: x  / Bili: x / Urobili: x   Blood: x / Protein: x / Nitrite: x   Leuk Esterase: x / RBC: x / WBC x   Sq Epi: x / Non Sq Epi: x / Bacteria: x        RADIOLOGY & ADDITIONAL TESTS:

## 2025-05-29 NOTE — CASE MANAGEMENT PROGRESS NOTE - NSCMPROGRESSNOTE_GEN_ALL_CORE
Discussed pt on rounds, pt remains acute, pt c/o dyspnea, on o22LNC. CM will continue to collaborate with interdisciplinary team and remain available to assist.

## 2025-05-29 NOTE — GOALS OF CARE CONVERSATION - ADVANCED CARE PLANNING - CONVERSATION DETAILS
Palliative care team met with patient at bedside. Reviewed patient's medical and social history as well as events leading to patient's hospitalization. Writer discussed patient's current diagnosis (COPD exacerbation, hx of AFib, COPD (not on home o2), CKD, aplastic anemia, PMR (chronic prednisone with AVN hip), HLD, HTN, DM), medical condition and management. Patient states she has a HCP with her daughter Lenore as health care agent. Inquired about patient's wishes regarding extent of medical care to be provided including thoughts regarding cardiopulmonary resuscitation and mechanical ventilation/intubation. Patient states her wishes are for CPR. All questions answered.

## 2025-05-29 NOTE — PROGRESS NOTE ADULT - SUBJECTIVE AND OBJECTIVE BOX
[facility] -- Sai Valle, Girish Melgoza Savella, Goodger, Cohen: Office # [phone]    Follow Up:  deja iqbal    Subjective/Observations: Patient seen and examined. Patient awake, alert, resting in bed. No complaints of chest pain, dyspnea, palpitations or dizziness. No signs of orthopnea or PND. Lying almost flat in bed. Tolerating O2 via nasal cannula.     REVIEW OF SYSTEMS: All other review of systems are negative unless indicated above    PAST MEDICAL & SURGICAL HISTORY:  COPD (chronic obstructive pulmonary disease)      DM (diabetes mellitus)  diet-controlled      PAF (paroxysmal atrial fibrillation)  s/p ablation      Hiatal hernia      H/O aplastic anemia      History of IBS      H/O osteoporosis      HLD (hyperlipidemia)      PMR (polymyalgia rheumatica)      History of basal cell cancer      Chronic kidney disease (CKD)      Hypotension      Presence of IVC filter      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      History of immunodeficiency      Lumbar compression fracture      H/O hiatal hernia      H/O pulmonary fibrosis      H/O sinus bradycardia      History of fracture of right hip  "unoperable"      S/P hysterectomy      S/P foot surgery      S/P knee surgery      After cataract  bilateral eye cataract surgically removed      Closed right hip fracture  rigth hip ORIF july 19 2016      S/P IVC filter  nov 2016      S/P carpal tunnel release  Right 2008 & 6/27/17      H/O kyphoplasty      Port-A-Cath in place  right chest          MEDICATIONS  (STANDING):  albuterol    0.083% 2.5 milliGRAM(s) Nebulizer every 6 hours  albuterol    90 MICROgram(s) HFA Inhaler 1 Puff(s) Inhalation every 4 hours  aMIOdarone    Tablet 100 milliGRAM(s) Oral daily  apixaban 2.5 milliGRAM(s) Oral every 12 hours  atorvastatin 10 milliGRAM(s) Oral at bedtime  fluticasone propionate/ salmeterol 250-50 MICROgram(s) Diskus 1 Dose(s) Inhalation two times a day  gabapentin 400 milliGRAM(s) Oral two times a day  leflunomide 10 milliGRAM(s) Oral daily  midodrine. 10 milliGRAM(s) Oral <User Schedule>  pantoprazole    Tablet 40 milliGRAM(s) Oral before breakfast  predniSONE   Tablet 50 milliGRAM(s) Oral daily  tiotropium 2.5 MICROgram(s) Inhaler 2 Puff(s) Inhalation daily  tiZANidine 2 milliGRAM(s) Oral at bedtime  torsemide 20 milliGRAM(s) Oral daily    MEDICATIONS  (PRN):  albuterol/ipratropium for Nebulization 3 milliLiter(s) Nebulizer every 6 hours PRN Shortness of Breath and/or Wheezing  dicyclomine 10 milliGRAM(s) Oral two times a day before meals PRN bowel irritability  melatonin 3 milliGRAM(s) Oral at bedtime PRN Insomnia    Allergies    No Known Allergies    Intolerances      Vital Signs Last 24 Hrs  T(C): 36.6 (29 May 2025 04:33), Max: 36.8 (28 May 2025 12:30)  T(F): 97.9 (29 May 2025 04:33), Max: 98.2 (28 May 2025 12:30)  HR: 58 (29 May 2025 08:32) (58 - 70)  BP: 103/52 (29 May 2025 04:33) (103/52 - 110/-)  BP(mean): 59 (28 May 2025 20:34) (59 - 59)  RR: 18 (29 May 2025 04:33) (18 - 18)  SpO2: 98% (29 May 2025 08:32) (96% - 100%)    Parameters below as of 29 May 2025 08:32  Patient On (Oxygen Delivery Method): nasal cannula, 2 LPM      I&O's Summary    Weight (kg): 69.7 (05-28 @ 16:39)    TELE: Sr 50-60s PAC, nonsustained short PAT 130s  x 3   PHYSICAL EXAM:  Constitutional: NAD, awake and alert  HEENT: Moist Mucous Membranes, Anicteric  Pulmonary: Non-labored, breath sounds are clear bilaterally, No wheezing, rales or rhonchi  Cardiovascular: Regular, S1 and S2, No murmurs, No rubs, gallops or clicks  Gastrointestinal:  soft, nontender, nondistended   Lymph: No peripheral edema. No lymphadenopathy.   Skin: No visible rashes or ulcers.  Psych:  Mood & affect appropriate      LABS: All Labs Reviewed:                        8.9    3.21  )-----------( 146      ( 29 May 2025 07:50 )             28.3                         8.9    2.26  )-----------( 135      ( 28 May 2025 05:45 )             27.7                         10.0   1.71  )-----------( 170      ( 27 May 2025 06:06 )             31.8     28 May 2025 05:45    141    |  100    |  41     ----------------------------<  105    4.6     |  33     |  1.30   27 May 2025 16:34    x      |  x      |  x      ----------------------------<  x      4.6     |  x      |  x      27 May 2025 06:06    139    |  103    |  27     ----------------------------<  161    5.7     |  30     |  1.20     Ca    9.3        28 May 2025 05:45  Ca    9.1        27 May 2025 06:06  Ca    8.6        26 May 2025 18:00  Phos  3.8       27 May 2025 06:06  Mg     1.7       27 May 2025 06:06    TPro  6.9    /  Alb  3.8    /  TBili  0.7    /  DBili  x      /  AST  <3     /  ALT  15     /  AlkPhos  47     27 May 2025 06:06  TPro  6.3    /  Alb  3.4    /  TBili  0.6    /  DBili  x      /  AST  7      /  ALT  14     /  AlkPhos  42     26 May 2025 18:00     Troponin I, High Sensitivity Result: 15.2 ng/L (05-26-25 @ 18:00)  D-Dimer Assay, Quantitative: <150 ng/mL DDU (05-26-25 @ 18:00)    12 Lead ECG:   Ventricular Rate 72 BPM    Atrial Rate 62 BPM    P-R Interval 132 ms    QRS Duration 80 ms    Q-T Interval 418 ms    QTC Calculation(Bazett) 457 ms    P Axis 83 degrees    R Axis -63 degrees    T Axis 63 degrees    Diagnosis Line Sinus rhythm with premature atrial complexes  Pulmonary disease pattern  Left anterior fascicular block  Abnormal ECG  When compared with ECG of 02-SEP-2023 11:31,  premature atrial complexes are now present  Confirmed by ROSALVA GOODRICH ([id number]) on 5/27/2025 6:48:02 PM (05-26-25 @ 17:20)       EXAM:  ECHO TTE W/O CON COMP W/DOPPLR           PROCEDURE DATE:  07/17/2016      INTERPRETATION:  Ordering Physician: LIA MOLINA    Indication: Syncope    Study Quality: Technically difficult and limited     Height: 167 cm  Weight: 91 kg  BSA: 2.0 sq m  Blood Pressure: 104/70    MEASUREMENTS  IVS: 1.4cm  PWT: 1.4cm  LA: 4.4cm  AO: 3.6cm  LVIDd: 4.6cm  LVIDs: 2.4cm    FINDINGS  Left Ventricle: The endocardium is not well-visualized. Grossly, there   appears to be normal left ventricular systolic function.  Aortic Valve: Calcified trileaflet aortic valve with normal opening. No   significant aortic insufficiency.  Mitral Valve: Mitral annular calcification and calcified mitral valve   leaflets. Mild mitral insufficiency.  Tricuspid Valve: Not well visualized. Grossly normal. Mild tricuspid   insufficiency.  Pulmonic Valve: Not well visualized. Mild pulmonic insufficiency.  Left Atrium: Enlarged  Right Ventricle: Normal right ventricular size and systolic function.  Right Atrium: Enlarged  Diastolic function: Grade 1 diastolic dysfunction.    DAVID MELGOZA M.D., ATTENDING CARDIOLOGIST  This document has been electronically signed. Jul 18 2016  1:12P Include Z78.9 (Other Specified Conditions Influencing Health Status) As An Associated Diagnosis?: No

## 2025-05-29 NOTE — PHARMACOTHERAPY INTERVENTION NOTE - COMMENTS
Medication reconciliation completed on admission with patient, [facility], and [facility]. Updated medication list in OMR/prescription writer.    Home Medications:  amiodarone 200 mg oral tablet: 0.5 tab(s) orally once a day (27 May 2025 13:21)  Bentyl 10 mg oral capsule: 1 cap(s) orally 2 times a day, As Needed (27 May 2025 13:22)  Eliquis 2.5 mg oral tablet: 1 tab(s) orally 2 times a day (27 May 2025 12:39)  esomeprazole 20 mg oral delayed release capsule: 1 cap(s) orally once a day (27 May 2025 13:23)  folic acid 1 mg oral tablet: 1 tab(s) orally once a day (in the morning) (27 May 2025 12:39)  gabapentin 400 mg oral capsule: 1 cap(s) orally 2 times a day (27 May 2025 12:39)  IVIG monthly:  (27 May 2025 12:39)  leflunomide 10 mg oral tablet: 1 tab(s) orally once a day (27 May 2025 13:23)  midodrine 5 mg oral tablet: 2 tab(s) orally in the morning and 1 tab orally in the evening (27 May 2025 13:23)  montelukast 10 mg oral tablet: 1 tab(s) orally once a day (27 May 2025 13:23)  predniSONE 5 mg oral tablet: 1 tab(s) orally once a day (27 May 2025 13:22)  Procrit 10,000 units/mL preservative-free injectable solution: injectable once a week WEDNESDAY (27 May 2025 12:39)  Reclast Infusion , IV x 1 yearly:  (27 May 2025 13:22)  simvastatin 10 mg oral tablet: 1 tab(s) orally once a day (at bedtime) (27 May 2025 12:39)  tiZANidine: 2 tab(s) orally once a day (at bedtime) as needed for  muscle spasm (27 May 2025 13:22)  torsemide 20 mg oral tablet: 1 tab(s) orally once a day (in the morning) (27 May 2025 12:39)    Hortensia Woods PharmD  Clinical Pharmacy Specialist  [phone] or Teams
84y F on apixaban 2.5mg bid for PMH of AF. Discussed with Dr. Dunaway, patient does not meet reduced dose criteria as patient only met age>80 criteria. Per heme and cardio, 2.5mg dose recommended due to aplastic anemia and need for repeated transfusions. Wlll continue with 2.5mg bid per clinical discretion.

## 2025-05-29 NOTE — PROGRESS NOTE ADULT - SUBJECTIVE AND OBJECTIVE BOX
CHIEF COMPLAINT/ REASON FOR VISIT  .. Patient was seen to address the  issue listed under PROBLEM LIST which is located toward bottom of this note     MARTINEZ PATEL 2EAS 201 D1    Allergies    No Known Allergies    Intolerances        PAST MEDICAL & SURGICAL HISTORY:  COPD (chronic obstructive pulmonary disease)      DM (diabetes mellitus)  diet-controlled      PAF (paroxysmal atrial fibrillation)  s/p ablation      Hiatal hernia      H/O aplastic anemia      History of IBS      H/O osteoporosis      HLD (hyperlipidemia)      PMR (polymyalgia rheumatica)      History of basal cell cancer      Chronic kidney disease (CKD)      Hypotension      Presence of IVC filter      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      History of immunodeficiency      Lumbar compression fracture      H/O hiatal hernia      H/O pulmonary fibrosis      H/O sinus bradycardia      History of fracture of right hip  "unoperable"      S/P hysterectomy      S/P foot surgery      S/P knee surgery      After cataract  bilateral eye cataract surgically removed      Closed right hip fracture  rigth hip ORIF july 19 2016      S/P IVC filter  nov 2016      S/P carpal tunnel release  Right 2008 & 6/27/17      H/O kyphoplasty      Port-A-Cath in place  right chest          FAMILY HISTORY:  Family history of bladder cancer (Mother)    Family history of myocardial infarction (Father)    FH: atrial fibrillation (Father)        Home Medications:  amiodarone 200 mg oral tablet: 0.5 tab(s) orally once a day (27 May 2025 13:21)  Bentyl 10 mg oral capsule: 1 cap(s) orally 2 times a day, As Needed (27 May 2025 13:22)  Eliquis 2.5 mg oral tablet: 1 tab(s) orally 2 times a day (27 May 2025 12:39)  esomeprazole 20 mg oral delayed release capsule: 1 cap(s) orally once a day (27 May 2025 13:23)  folic acid 1 mg oral tablet: 1 tab(s) orally once a day (in the morning) (27 May 2025 12:39)  gabapentin 400 mg oral capsule: 1 cap(s) orally 2 times a day (27 May 2025 12:39)  IVIG monthly:  (27 May 2025 12:39)  leflunomide 10 mg oral tablet: 1 tab(s) orally once a day (27 May 2025 13:23)  midodrine 5 mg oral tablet: 2 tab(s) orally in the morning and 1 tab orally in the evening (27 May 2025 13:23)  montelukast 10 mg oral tablet: 1 tab(s) orally once a day (27 May 2025 13:23)  predniSONE 5 mg oral tablet: 1 tab(s) orally once a day (27 May 2025 13:22)  Procrit 10,000 units/mL preservative-free injectable solution: injectable once a week WEDNESDAY (27 May 2025 12:39)  Reclast Infusion , IV x 1 yearly:  (27 May 2025 13:22)  simvastatin 10 mg oral tablet: 1 tab(s) orally once a day (at bedtime) (27 May 2025 12:39)  tiZANidine: 2 tab(s) orally once a day (at bedtime) as needed for  muscle spasm (27 May 2025 13:22)  torsemide 20 mg oral tablet: 1 tab(s) orally once a day (in the morning) (27 May 2025 12:39)      MEDICATIONS  (STANDING):  albuterol    0.083% 2.5 milliGRAM(s) Nebulizer every 6 hours  albuterol    90 MICROgram(s) HFA Inhaler 1 Puff(s) Inhalation every 4 hours  aMIOdarone    Tablet 100 milliGRAM(s) Oral daily  apixaban 2.5 milliGRAM(s) Oral every 12 hours  atorvastatin 10 milliGRAM(s) Oral at bedtime  fluticasone propionate/ salmeterol 250-50 MICROgram(s) Diskus 1 Dose(s) Inhalation two times a day  gabapentin 400 milliGRAM(s) Oral two times a day  leflunomide 10 milliGRAM(s) Oral daily  midodrine. 10 milliGRAM(s) Oral <User Schedule>  pantoprazole    Tablet 40 milliGRAM(s) Oral before breakfast  predniSONE   Tablet 50 milliGRAM(s) Oral daily  tiotropium 2.5 MICROgram(s) Inhaler 2 Puff(s) Inhalation daily  tiZANidine 2 milliGRAM(s) Oral at bedtime  torsemide 20 milliGRAM(s) Oral daily    MEDICATIONS  (PRN):  albuterol/ipratropium for Nebulization 3 milliLiter(s) Nebulizer every 6 hours PRN Shortness of Breath and/or Wheezing  dicyclomine 10 milliGRAM(s) Oral two times a day before meals PRN bowel irritability  melatonin 3 milliGRAM(s) Oral at bedtime PRN Insomnia      Diet, Regular:   DASH/TLC Sodium & Cholesterol Restricted  2000mL Fluid Restriction ([id number]) (05-26-25 @ 23:27) [Active]          Vital Signs Last 24 Hrs  T(C): 36.6 (29 May 2025 04:33), Max: 36.8 (28 May 2025 12:30)  T(F): 97.9 (29 May 2025 04:33), Max: 98.2 (28 May 2025 12:30)  HR: 59 (29 May 2025 04:33) (59 - 70)  BP: 103/52 (29 May 2025 04:33) (103/52 - 110/-)  BP(mean): 59 (28 May 2025 20:34) (59 - 59)  RR: 18 (29 May 2025 04:33) (18 - 18)  SpO2: 98% (29 May 2025 04:33) (96% - 100%)    Parameters below as of 29 May 2025 04:33  Patient On (Oxygen Delivery Method): nasal cannula  O2 Flow (L/min): 2                LABS:                        8.9    2.26  )-----------( 135      ( 28 May 2025 05:45 )             27.7     05-28    141  |  100  |  41[H]  ----------------------------<  105[H]  4.6   |  33[H]  |  1.30    Ca    9.3      28 May 2025 05:45        Urinalysis Basic - ( 28 May 2025 05:45 )    Color: x / Appearance: x / SG: x / pH: x  Gluc: 105 mg/dL / Ketone: x  / Bili: x / Urobili: x   Blood: x / Protein: x / Nitrite: x   Leuk Esterase: x / RBC: x / WBC x   Sq Epi: x / Non Sq Epi: x / Bacteria: x            WBC:  WBC Count: 2.26 K/uL (05-28 @ 05:45)  WBC Count: 1.71 K/uL (05-27 @ 06:06)  WBC Count: 3.08 K/uL (05-26 @ 18:00)      MICROBIOLOGY:  RECENT CULTURES:                  Sodium:  Sodium: 141 mmol/L (05-28 @ 05:45)  Sodium: 139 mmol/L (05-27 @ 06:06)  Sodium: 140 mmol/L (05-26 @ 18:00)      1.30 mg/dL 05-28 @ 05:45  1.20 mg/dL 05-27 @ 06:06  1.20 mg/dL 05-26 @ 18:00      Hemoglobin:  Hemoglobin: 8.9 g/dL (05-28 @ 05:45)  Hemoglobin: 10.0 g/dL (05-27 @ 06:06)  Hemoglobin: 8.9 g/dL (05-26 @ 18:00)      Platelets: Platelet Count - Automated: 135 K/uL (05-28 @ 05:45)  Platelet Count - Automated: 170 K/uL (05-27 @ 06:06)  Platelet Count - Automated: 156 K/uL (05-26 @ 18:00)          Urinalysis Basic - ( 28 May 2025 05:45 )    Color: x / Appearance: x / SG: x / pH: x  Gluc: 105 mg/dL / Ketone: x  / Bili: x / Urobili: x   Blood: x / Protein: x / Nitrite: x   Leuk Esterase: x / RBC: x / WBC x   Sq Epi: x / Non Sq Epi: x / Bacteria: x        RADIOLOGY & ADDITIONAL STUDIES:      MICROBIOLOGY:  RECENT CULTURES:

## 2025-05-29 NOTE — CONSULT NOTE ADULT - SUBJECTIVE AND OBJECTIVE BOX
Patient is a 84y old  Female who presents with a chief complaint of Shortness of breath (29 May 2025 09:39)      HPI:  83 y/o F with PMH AFon Eliquis, COPD (not on home o2), CKD, aplastic anemia, PMR (chronic prednisone with AVN hip), HLD, HTN, DM who p/w worsening SOB over the past two days, denies CP/ palpitations, has home O2 setup (but doesn't use) but put it on after noticing SpO2 in the 80's, which improved SOB. Endorses worsening SWAIN, denies significant wheezing, cough, recent illness med changes, or changes in diet. Adherent with home meds.    Denies fever, chills, chest pain, palpitations, cough, abdominal pain, nausea, vomiting, diarrhea, constipation, urinary frequency, urgency, or dysuria, headaches, changes in vision, dizziness, numbness, tingling.    ED Course:   Vitals: BP: 130/77, HR: 77, Temp: 98, RR: 26, SpO2: 96% on 4L NC  Labs: BNP 2185, trop WNL, WBC: 3.08 (around baseline), Hb 8.9 () (around baseline), HCO3- 32,  RVP negative,     CXR: linear interstitial prominence in b/l bases, trace pleural fluid, potential cardiogenic pulmonary edema, medi-port tip at cavoatrial junction, as per personal read, official read pending   B/L LE Dopplers: No evidence of DVT  EKG: NSR PACs  Received in the ED: lasix 20mg IV, solumedrol 125mg IV, duonebs   (26 May 2025 20:59)       ROS:  Negative except for:    PAST MEDICAL & SURGICAL HISTORY:  COPD (chronic obstructive pulmonary disease)      DM (diabetes mellitus)  diet-controlled      PAF (paroxysmal atrial fibrillation)  s/p ablation      Hiatal hernia      H/O aplastic anemia      History of IBS      H/O osteoporosis      HLD (hyperlipidemia)      PMR (polymyalgia rheumatica)      History of basal cell cancer      Chronic kidney disease (CKD)      Hypotension      Presence of IVC filter      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      History of immunodeficiency      Lumbar compression fracture      H/O hiatal hernia      H/O pulmonary fibrosis      H/O sinus bradycardia      History of fracture of right hip  "unoperable"      S/P hysterectomy      S/P foot surgery      S/P knee surgery      After cataract  bilateral eye cataract surgically removed      Closed right hip fracture  rigth hip ORIF july 19 2016      S/P IVC filter  nov 2016      S/P carpal tunnel release  Right 2008 & 6/27/17      H/O kyphoplasty      Port-A-Cath in place  right chest          SOCIAL HISTORY:    FAMILY HISTORY:  Family history of bladder cancer (Mother)    Family history of myocardial infarction (Father)    FH: atrial fibrillation (Father)        MEDICATIONS  (STANDING):  albuterol    0.083% 2.5 milliGRAM(s) Nebulizer every 6 hours  albuterol    90 MICROgram(s) HFA Inhaler 1 Puff(s) Inhalation every 4 hours  aMIOdarone    Tablet 100 milliGRAM(s) Oral daily  apixaban 2.5 milliGRAM(s) Oral every 12 hours  atorvastatin 10 milliGRAM(s) Oral at bedtime  fluticasone propionate/ salmeterol 250-50 MICROgram(s) Diskus 1 Dose(s) Inhalation two times a day  gabapentin 400 milliGRAM(s) Oral two times a day  leflunomide 10 milliGRAM(s) Oral daily  methylPREDNISolone sodium succinate Injectable 40 milliGRAM(s) IV Push every 12 hours  midodrine. 10 milliGRAM(s) Oral <User Schedule>  pantoprazole    Tablet 40 milliGRAM(s) Oral before breakfast  tiotropium 2.5 MICROgram(s) Inhaler 2 Puff(s) Inhalation daily  tiZANidine 2 milliGRAM(s) Oral at bedtime  torsemide 20 milliGRAM(s) Oral daily    MEDICATIONS  (PRN):  albuterol/ipratropium for Nebulization 3 milliLiter(s) Nebulizer every 6 hours PRN Shortness of Breath and/or Wheezing  dicyclomine 10 milliGRAM(s) Oral two times a day before meals PRN bowel irritability  melatonin 3 milliGRAM(s) Oral at bedtime PRN Insomnia      Allergies    No Known Allergies    Intolerances        Vital Signs Last 24 Hrs  T(C): 36.8 (29 May 2025 11:26), Max: 36.8 (29 May 2025 11:26)  T(F): 98.2 (29 May 2025 11:26), Max: 98.2 (29 May 2025 11:26)  HR: 61 (29 May 2025 14:01) (58 - 70)  BP: 99/64 (29 May 2025 11:26) (99/64 - 110/-)  BP(mean): 59 (28 May 2025 20:34) (59 - 59)  RR: 18 (29 May 2025 11:26) (18 - 18)  SpO2: 96% (29 May 2025 14:01) (96% - 100%)    Parameters below as of 29 May 2025 14:01  Patient On (Oxygen Delivery Method): nasal cannula, 2 LPM        PHYSICAL EXAM  General: adult in NAD  HEENT: clear oropharynx, anicteric sclera, pink conjunctivae  Neck: supple  CV: normal S1S2 with no murmur rubs or gallops  Lungs: clear to auscultation, no wheezes, no rhales  Abdomen: soft non-tender non-distended, no hepato/splenomegaly  Ext: no clubbing cyanosis or edema  Skin: no rashes and no petichiae  Neuro: alert and oriented X3 no focal deficits      LABS:    CBC Full  -  ( 29 May 2025 07:50 )  WBC Count : 3.21 K/uL  RBC Count : 2.84 M/uL  Hemoglobin : 8.9 g/dL  Hematocrit : 28.3 %  Platelet Count - Automated : 146 K/uL  Mean Cell Volume : 99.6 fl  Mean Cell Hemoglobin : 31.3 pg  Mean Cell Hemoglobin Concentration : 31.4 g/dL  Auto Neutrophil # : x  Auto Lymphocyte # : x  Auto Monocyte # : x  Auto Eosinophil # : x  Auto Basophil # : x  Auto Neutrophil % : x  Auto Lymphocyte % : x  Auto Monocyte % : x  Auto Eosinophil % : x  Auto Basophil % : x    05-29    138  |  97  |  47[H]  ----------------------------<  111[H]  4.5   |  35[H]  |  1.30    Ca    9.3      29 May 2025 07:50                BLOOD SMEAR INTERPRETATION:    RADIOLOGY & ADDITIONAL STUDIES:      < from: CT Chest No Cont (05.27.25 @ 01:12) >    ACC: [id number] EXAM:  CT CHEST   ORDERED BY: DEBBI HARKINS     PROCEDURE DATE:  05/27/2025          INTERPRETATION:  EXAMINATION: CT CHEST    CLINICAL INDICATION: Dyspnea.    TECHNIQUE: Noncontrast CT of the chest was obtained.    COMPARISON: Multiple CT, most recent 9/2/2023.    FINDINGS:    AIRWAYS AND LUNGS: Mild tracheobronchial secretions.  Mosaic attenuation.   Bilateral nodularity, similar to prior, is predominately clustered   similar to the prior study. Scattered linear and dependent atelectasis.    MEDIASTINUM AND PLEURA: There are no enlarged mediastinal, hilar or   axillary lymph nodes. The visualized portion of the thyroid gland is   unremarkable. There are small bilateral pleural effusions.  There is no   pneumothorax.    HEART AND VESSELS: There is cardiomegaly.  There are atherosclerotic   calcifications of the aorta and coronary arteries.  There is a small   pericardial effusion.    UPPER ABDOMEN: Images of the upper abdomen demonstrate left renal cyst.    BONES ANDSOFT TISSUES: Moderate age indeterminate T8 vertebral body   fracture is new from prior.  The soft tissues are unremarkable.    IMPRESSION:  Moderate age indeterminate T8 vertebral body fracture is new from prior.    There are small bilateral pleuraleffusions.    --- End of Report ---            AURE PEARSON MD; Attending Radiologist  This document has been electronically signed. May 27 2025  7:15AM    < end of copied text >

## 2025-05-30 LAB
ANION GAP SERPL CALC-SCNC: 7 MMOL/L — SIGNIFICANT CHANGE UP (ref 5–17)
APPEARANCE UR: CLEAR — SIGNIFICANT CHANGE UP
BASE EXCESS BLDA CALC-SCNC: 10.4 MMOL/L — HIGH (ref -2–3)
BILIRUB UR-MCNC: NEGATIVE — SIGNIFICANT CHANGE UP
BLOOD GAS COMMENTS ARTERIAL: SIGNIFICANT CHANGE UP
BUN SERPL-MCNC: 53 MG/DL — HIGH (ref 7–23)
CALCIUM SERPL-MCNC: 9.5 MG/DL — SIGNIFICANT CHANGE UP (ref 8.5–10.1)
CHLORIDE SERPL-SCNC: 99 MMOL/L — SIGNIFICANT CHANGE UP (ref 96–108)
CO2 SERPL-SCNC: 34 MMOL/L — HIGH (ref 22–31)
COLOR SPEC: YELLOW — SIGNIFICANT CHANGE UP
CREAT SERPL-MCNC: 1.6 MG/DL — HIGH (ref 0.5–1.3)
DIFF PNL FLD: NEGATIVE — SIGNIFICANT CHANGE UP
EGFR: 32 ML/MIN/1.73M2 — LOW
EGFR: 32 ML/MIN/1.73M2 — LOW
GAS PNL BLDA: SIGNIFICANT CHANGE UP
GLUCOSE SERPL-MCNC: 129 MG/DL — HIGH (ref 70–99)
GLUCOSE UR QL: NEGATIVE MG/DL — SIGNIFICANT CHANGE UP
HCO3 BLDA-SCNC: 35 MMOL/L — HIGH (ref 21–28)
HCT VFR BLD CALC: 26.1 % — LOW (ref 34.5–45)
HGB BLD-MCNC: 8.4 G/DL — LOW (ref 11.5–15.5)
KETONES UR QL: NEGATIVE MG/DL — SIGNIFICANT CHANGE UP
LEUKOCYTE ESTERASE UR-ACNC: ABNORMAL
MCHC RBC-ENTMCNC: 31.7 PG — SIGNIFICANT CHANGE UP (ref 27–34)
MCHC RBC-ENTMCNC: 32.2 G/DL — SIGNIFICANT CHANGE UP (ref 32–36)
MCV RBC AUTO: 98.5 FL — SIGNIFICANT CHANGE UP (ref 80–100)
NITRITE UR-MCNC: NEGATIVE — SIGNIFICANT CHANGE UP
NRBC BLD AUTO-RTO: 0 /100 WBCS — SIGNIFICANT CHANGE UP (ref 0–0)
NT-PROBNP SERPL-SCNC: 2330 PG/ML — HIGH (ref 0–450)
PCO2 BLDA: 51 MMHG — HIGH (ref 32–35)
PH BLDA: 7.44 — SIGNIFICANT CHANGE UP (ref 7.35–7.45)
PH UR: 5 — SIGNIFICANT CHANGE UP (ref 5–8)
PLATELET # BLD AUTO: 141 K/UL — LOW (ref 150–400)
PO2 BLDA: 86 MMHG — SIGNIFICANT CHANGE UP (ref 83–108)
POTASSIUM SERPL-MCNC: 5.2 MMOL/L — SIGNIFICANT CHANGE UP (ref 3.5–5.3)
POTASSIUM SERPL-SCNC: 5.2 MMOL/L — SIGNIFICANT CHANGE UP (ref 3.5–5.3)
PROT UR-MCNC: NEGATIVE MG/DL — SIGNIFICANT CHANGE UP
RBC # BLD: 2.65 M/UL — LOW (ref 3.8–5.2)
RBC # FLD: 24 % — HIGH (ref 10.3–14.5)
SAO2 % BLDA: 99 % — HIGH (ref 94–98)
SODIUM SERPL-SCNC: 140 MMOL/L — SIGNIFICANT CHANGE UP (ref 135–145)
SP GR SPEC: 1.02 — SIGNIFICANT CHANGE UP (ref 1–1.03)
UROBILINOGEN FLD QL: 0.2 MG/DL — SIGNIFICANT CHANGE UP (ref 0.2–1)
WBC # BLD: 2.08 K/UL — LOW (ref 3.8–10.5)
WBC # FLD AUTO: 2.08 K/UL — LOW (ref 3.8–10.5)

## 2025-05-30 PROCEDURE — 99232 SBSQ HOSP IP/OBS MODERATE 35: CPT

## 2025-05-30 PROCEDURE — 99233 SBSQ HOSP IP/OBS HIGH 50: CPT

## 2025-05-30 PROCEDURE — 71045 X-RAY EXAM CHEST 1 VIEW: CPT | Mod: 26

## 2025-05-30 RX ORDER — ALBUMIN (HUMAN) 12.5 G/50ML
50 INJECTION, SOLUTION INTRAVENOUS ONCE
Refills: 0 | Status: COMPLETED | OUTPATIENT
Start: 2025-05-30 | End: 2025-05-30

## 2025-05-30 RX ORDER — MONTELUKAST SODIUM 10 MG/1
10 TABLET ORAL DAILY
Refills: 0 | Status: DISCONTINUED | OUTPATIENT
Start: 2025-05-30 | End: 2025-06-05

## 2025-05-30 RX ADMIN — AMIODARONE HYDROCHLORIDE 100 MILLIGRAM(S): 50 INJECTION, SOLUTION INTRAVENOUS at 06:04

## 2025-05-30 RX ADMIN — GABAPENTIN 400 MILLIGRAM(S): 400 CAPSULE ORAL at 06:04

## 2025-05-30 RX ADMIN — Medication 1 DOSE(S): at 06:32

## 2025-05-30 RX ADMIN — APIXABAN 2.5 MILLIGRAM(S): 2.5 TABLET, FILM COATED ORAL at 06:04

## 2025-05-30 RX ADMIN — Medication 1 DOSE(S): at 18:31

## 2025-05-30 RX ADMIN — Medication 4 MILLILITER(S): at 19:32

## 2025-05-30 RX ADMIN — Medication 2.5 MILLIGRAM(S): at 00:30

## 2025-05-30 RX ADMIN — ALBUMIN (HUMAN) 50 MILLILITER(S): 12.5 INJECTION, SOLUTION INTRAVENOUS at 15:04

## 2025-05-30 RX ADMIN — TIZANIDINE 2 MILLIGRAM(S): 4 TABLET ORAL at 21:19

## 2025-05-30 RX ADMIN — MIDODRINE HYDROCHLORIDE 10 MILLIGRAM(S): 5 TABLET ORAL at 06:05

## 2025-05-30 RX ADMIN — IPRATROPIUM BROMIDE AND ALBUTEROL SULFATE 3 MILLILITER(S): .5; 2.5 SOLUTION RESPIRATORY (INHALATION) at 07:17

## 2025-05-30 RX ADMIN — Medication 40 MILLIGRAM(S): at 06:05

## 2025-05-30 RX ADMIN — Medication 4 MILLILITER(S): at 07:17

## 2025-05-30 RX ADMIN — METHYLPREDNISOLONE ACETATE 40 MILLIGRAM(S): 80 INJECTION, SUSPENSION INTRA-ARTICULAR; INTRALESIONAL; INTRAMUSCULAR; SOFT TISSUE at 06:05

## 2025-05-30 RX ADMIN — MIDODRINE HYDROCHLORIDE 10 MILLIGRAM(S): 5 TABLET ORAL at 18:31

## 2025-05-30 RX ADMIN — Medication 20 MILLIGRAM(S): at 06:04

## 2025-05-30 RX ADMIN — ATORVASTATIN CALCIUM 10 MILLIGRAM(S): 80 TABLET, FILM COATED ORAL at 21:19

## 2025-05-30 RX ADMIN — METHYLPREDNISOLONE ACETATE 40 MILLIGRAM(S): 80 INJECTION, SUSPENSION INTRA-ARTICULAR; INTRALESIONAL; INTRAMUSCULAR; SOFT TISSUE at 17:19

## 2025-05-30 RX ADMIN — TIOTROPIUM BROMIDE INHALATION SPRAY 2 PUFF(S): 3.12 SPRAY, METERED RESPIRATORY (INHALATION) at 06:32

## 2025-05-30 RX ADMIN — Medication 2.5 MILLIGRAM(S): at 19:29

## 2025-05-30 RX ADMIN — APIXABAN 2.5 MILLIGRAM(S): 2.5 TABLET, FILM COATED ORAL at 17:19

## 2025-05-30 RX ADMIN — LEFLUNOMIDE 10 MILLIGRAM(S): 10 TABLET ORAL at 17:19

## 2025-05-30 RX ADMIN — Medication 2.5 MILLIGRAM(S): at 13:38

## 2025-05-30 RX ADMIN — GABAPENTIN 400 MILLIGRAM(S): 400 CAPSULE ORAL at 17:19

## 2025-05-30 NOTE — PROGRESS NOTE ADULT - SUBJECTIVE AND OBJECTIVE BOX
[facility] -- Sai Valle, Girish Melgoza Savella, David López: Office # [phone]    Follow Up:  deja iqbal    Subjective/Observations: Patient seen and examined. Patient awake, alert, resting in bed. No complaints of chest pain, dyspnea, palpitations or dizziness. No signs of orthopnea or PND. Tolerating O2 via nasal cannula. Reports she is SOB this morning after breakfast, now back in bed.       REVIEW OF SYSTEMS: All other review of systems are negative unless indicated above    PAST MEDICAL & SURGICAL HISTORY:  COPD (chronic obstructive pulmonary disease)      DM (diabetes mellitus)  diet-controlled      PAF (paroxysmal atrial fibrillation)  s/p ablation      Hiatal hernia      H/O aplastic anemia      History of IBS      H/O osteoporosis      HLD (hyperlipidemia)      PMR (polymyalgia rheumatica)      History of basal cell cancer      Chronic kidney disease (CKD)      Hypotension      Presence of IVC filter      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      History of immunodeficiency      Lumbar compression fracture      H/O hiatal hernia      H/O pulmonary fibrosis      H/O sinus bradycardia      History of fracture of right hip  "unoperable"      S/P hysterectomy      S/P foot surgery      S/P knee surgery      After cataract  bilateral eye cataract surgically removed      Closed right hip fracture  rigth hip ORIF july 19 2016      S/P IVC filter  nov 2016      S/P carpal tunnel release  Right 2008 & 6/27/17      H/O kyphoplasty      Port-A-Cath in place  right chest          MEDICATIONS  (STANDING):  albuterol    0.083% 2.5 milliGRAM(s) Nebulizer every 6 hours  albuterol    90 MICROgram(s) HFA Inhaler 1 Puff(s) Inhalation every 4 hours  aMIOdarone    Tablet 100 milliGRAM(s) Oral daily  apixaban 2.5 milliGRAM(s) Oral every 12 hours  atorvastatin 10 milliGRAM(s) Oral at bedtime  fluticasone propionate/ salmeterol 250-50 MICROgram(s) Diskus 1 Dose(s) Inhalation two times a day  gabapentin 400 milliGRAM(s) Oral two times a day  leflunomide 10 milliGRAM(s) Oral daily  methylPREDNISolone sodium succinate Injectable 40 milliGRAM(s) IV Push every 12 hours  midodrine. 10 milliGRAM(s) Oral <User Schedule>  pantoprazole    Tablet 40 milliGRAM(s) Oral before breakfast  sodium chloride 3%  Inhalation 4 milliLiter(s) Inhalation every 12 hours  tiotropium 2.5 MICROgram(s) Inhaler 2 Puff(s) Inhalation daily  tiZANidine 2 milliGRAM(s) Oral at bedtime  torsemide 20 milliGRAM(s) Oral daily    MEDICATIONS  (PRN):  albuterol/ipratropium for Nebulization 3 milliLiter(s) Nebulizer every 6 hours PRN Shortness of Breath and/or Wheezing  dicyclomine 10 milliGRAM(s) Oral two times a day before meals PRN bowel irritability  melatonin 3 milliGRAM(s) Oral at bedtime PRN Insomnia    Allergies    No Known Allergies    Intolerances      Vital Signs Last 24 Hrs  T(C): 36.6 (30 May 2025 04:41), Max: 37.3 (29 May 2025 20:08)  T(F): 97.9 (30 May 2025 04:41), Max: 99.1 (29 May 2025 20:08)  HR: 62 (30 May 2025 08:06) (49 - 63)  BP: 105/63 (30 May 2025 04:41) (99/64 - 116/62)  BP(mean): --  RR: 18 (30 May 2025 04:41) (18 - 18)  SpO2: 98% (30 May 2025 08:06) (96% - 100%)    Parameters below as of 30 May 2025 08:06  Patient On (Oxygen Delivery Method): nasal cannula, 2 LPM      I&O's Summary    29 May 2025 07:01  -  30 May 2025 07:00  --------------------------------------------------------  IN: 0 mL / OUT: 1750 mL / NET: -1750 mL    TELE: Sr 50-60s PAC, freq atrial bigeminy  PHYSICAL EXAM:  Constitutional: NAD, awake and alert  HEENT: Moist Mucous Membranes, Anicteric  Pulmonary: Non-labored, breath sounds are clear bilaterally, Diminished at bases No wheezing, rales or rhonchi  Cardiovascular: Regular, S1 and S2, No murmurs, No rubs, gallops or clicks  Gastrointestinal:  soft, nontender, nondistended   Lymph: No peripheral edema. No lymphadenopathy.   Skin: No visible rashes or ulcers.  Psych:  Mood & affect appropriate    LABS: All Labs Reviewed:                        8.4    2.08  )-----------( 141      ( 30 May 2025 06:17 )             26.1                         8.9    3.21  )-----------( 146      ( 29 May 2025 07:50 )             28.3                         8.9    2.26  )-----------( 135      ( 28 May 2025 05:45 )             27.7     30 May 2025 06:17    140    |  99     |  53     ----------------------------<  129    5.2     |  34     |  1.60   29 May 2025 07:50    138    |  97     |  47     ----------------------------<  111    4.5     |  35     |  1.30   28 May 2025 05:45    141    |  100    |  41     ----------------------------<  105    4.6     |  33     |  1.30     Ca    9.5        30 May 2025 06:17  Ca    9.3        29 May 2025 07:50  Ca    9.3        28 May 2025 05:45       Troponin I, High Sensitivity Result: 15.2 ng/L (05-26-25 @ 18:00)  D-Dimer Assay, Quantitative: <150 ng/mL DDU (05-26-25 @ 18:00)    12 Lead ECG:   Ventricular Rate 72 BPM    Atrial Rate 62 BPM    P-R Interval 132 ms    QRS Duration 80 ms    Q-T Interval 418 ms    QTC Calculation(Bazett) 457 ms    P Axis 83 degrees    R Axis -63 degrees    T Axis 63 degrees    Diagnosis Line Sinus rhythm with premature atrial complexes  Pulmonary disease pattern  Left anterior fascicular block  Abnormal ECG  When compared with ECG of 02-SEP-2023 11:31,  premature atrial complexes are now present  Confirmed by ROSALVA GOODRICH ([id number]) on 5/27/2025 6:48:02 PM (05-26-25 @ 17:20)       EXAM:  ECHO TTE W/O CON COMP W/DOPPLR           PROCEDURE DATE:  07/17/2016      INTERPRETATION:  Ordering Physician: LIA MOLINA    Indication: Syncope    Study Quality: Technically difficult and limited     Height: 167 cm  Weight: 91 kg  BSA: 2.0 sq m  Blood Pressure: 104/70    MEASUREMENTS  IVS: 1.4cm  PWT: 1.4cm  LA: 4.4cm  AO: 3.6cm  LVIDd: 4.6cm  LVIDs: 2.4cm    FINDINGS  Left Ventricle: The endocardium is not well-visualized. Grossly, there   appears to be normal left ventricular systolic function.  Aortic Valve: Calcified trileaflet aortic valve with normal opening. No   significant aortic insufficiency.  Mitral Valve: Mitral annular calcification and calcified mitral valve   leaflets. Mild mitral insufficiency.  Tricuspid Valve: Not well visualized. Grossly normal. Mild tricuspid   insufficiency.  Pulmonic Valve: Not well visualized. Mild pulmonic insufficiency.  Left Atrium: Enlarged  Right Ventricle: Normal right ventricular size and systolic function.  Right Atrium: Enlarged  Diastolic function: Grade 1 diastolic dysfunction.       DAVID MELGOZA M.D., ATTENDING CARDIOLOGIST  This document has been electronically signed. Jul 18 2016  1:12P

## 2025-05-30 NOTE — PROGRESS NOTE ADULT - PROBLEM SELECTOR PLAN 2
No longer taking trelegy inhaler, stopped 6 mo ago by Dr. Gallegos (pulm)  wean O2 as tolerated to obtain SpO2 88-92% goal.  For potential COPD exacerbation continue duonebs q6h prn, IV solumedrol,  pulmonology (Dr. Baxter) consulted  Will also add nebusal to help thin secretions
No longer taking trelegy inhaler, stopped 6 mo ago by Dr. Gallegos (pulm)  wean O2 as tolerated to obtain SpO2 88-92% goal.  For potential COPD exacerbation continue duonebs q6h prn, prednisone 50 mg x5 days,  pulmonology (Dr. Baxter) consulted
No longer taking trelegy inhaler, stopped 6 mo ago by Dr. Gallegos (pulm)  wean O2 as tolerated to obtain SpO2 88-92% goal.  For potential COPD exacerbation continue duonebs q6h prn, prednisone 50 mg x5 days,  pulmonology (Dr. Baxter) consulted
No longer taking trelegy inhaler, stopped 6 mo ago by Dr. Gallegos (pulm)  wean O2 as tolerated to obtain SpO2 88-92% goal.  For potential COPD exacerbation continue duonebs q6h prn, IV solumedrol,  pulmonology (Dr. Baxter) consulted  Will also add nebusal to help thin secretions

## 2025-05-30 NOTE — PROGRESS NOTE ADULT - PROBLEM SELECTOR PLAN 3
Not on home medications  Consistent carb diet  Insulin sliding scale  Continue home gabapentin

## 2025-05-30 NOTE — PROGRESS NOTE ADULT - PROBLEM SELECTOR PLAN 6
Chronic aplastic anemia, baseline Hb, gets frequent infusions and transfusions  No signs of bleeding
Chronic aplastic anemia, baseline Hb, gets frequent infusions and transfusions  No signs of bleeding
Chronic aplastic anemia, baseline Hb, gets frequent infusions and transfusions  No signs of bleeding  Heme/Onc consulted per patient request, appreciate recs
Chronic aplastic anemia, baseline Hb, gets frequent infusions and transfusions  No signs of bleeding  Heme/Onc consulted per patient request, appreciate recs

## 2025-05-30 NOTE — PROGRESS NOTE ADULT - ASSESSMENT
REASON FOR VISIT  .. Management of problems listed below      EVENTS/CURRENT ISSUES.  .... 5/30/2025 abg shows metabolic alkalosis and resp acidosis noct bpap ordered   .... 5/29/2025 pred changed solumed   .... 5/28/2025 lasix changed to torsemide   .... 5/27/2025 being rxed for copd chf   .... 5/27/2025 85 y/o F with PMH AFon Eliquis, COPD (not on home o2), CKD, aplastic anemia, PMR (chronic prednisone with AVN hip aw sob wheeze         REVIEW OF SYMPTOMS   Able to give ROS  Yes     RELIABILITY +/-   CONSTITUTIONAL Weakness Yes    ENDOCRINE  No heat or cold intolerance    ALLERGY No hives  Sore throat No stridor  RESP Shortness of breath YES   NEURO New weakness No   CARDIAC   Palpitations No         PHYSICAL EXAM    HEENT Unremarkable  atraumatic   RESP Fair air entry  Harsh breath sound   CARDIAC S1 S2 No S3     NO JVD    ABDOMEN No hepatosplenomegaly   PEDAL EDEMA present No calf tenderness  NO rash     REASON FOR VISIT  .. Management of problems listed below      CC.   . 5/26/2025 c/o shortness of breath- was 87% on room air- patient was wheezing- poatient given 1 albuterol treatment by ems- on 4 litres patient saturating at 96%  shortness of breath  OVERALL PRESENTATION.  . 5/26/2025 85 y/o F with PMH AFon Eliquis, COPD (not on home o2), CKD, aplastic anemia, PMR (chronic prednisone with AVN hip), HLD, HTN, DM who p/w worsening SOB over the past two days, denies CP/ palpitations, has home O2 setup (but doesn't use) but put it on after noticing SpO2 in the 80's, which improved SOB. Endorses worsening SWAIN, denies significant wheezing, cough, recent illness med changes, or changes in diet. Adherent with home meds.    Denies fever, chills, chest pain, palpitations, cough, abdominal pain, nausea, vomiting, diarrhea, constipation, urinary frequency, urgency, or dysuria, headaches, changes in vision, dizziness, numbness, tingling.    ED Course:   Vitals: BP: 130/77, HR: 77, Temp: 98, RR: 26, SpO2: 96% on 4L NC  Labs: BNP 2185, trop WNL, WBC: 3.08 (around baseline), Hb 8.9 () (around baseline), HCO3- 32,  RVP negative,     CXR: linear interstitial prominence in b/l bases, trace pleural fluid, potential cardiogenic pulmonary edema, medi-port tip at cavoatrial junction, as per personal read, official read pending   B/L LE Dopplers: No evidence of DVT  EKG: NSR PACs  Received in the ED: lasix 20mg IV, solumedrol 125mg IV, duonebs  PMH.      PAST HOSPITAL STAYS .  Home Medications:   * No Current Medications as of 05-May-2025 09:54 documented in Structured Notes  · folic acid 1 mg oral tablet: 1 tab(s) orally once a day (in the morning)  · midodrine 5 mg oral tablet: 1 tablet orally 3 times a day  · torsemide 20 mg oral tablet: 1 tab(s) orally once a day (in the morning)  · gabapentin 400 mg oral capsule: 1 cap(s) orally 2 times a day  · tiZANidine: 2 tab(s) orally once a day (at bedtime)  · Singulair 10 mg oral tablet: 1 tab(s) orally once a day (in the morning)  · Bentyl 10 mg oral capsule: 1 cap(s) orally 2 times a day, As Needed  · Leflunomide: once a day (after a meal)  · NexIUM 20 mg oral delayed release capsule: 1 cap(s) orally once a day (in the morning)  · simvastatin 10 mg oral tablet: 1 tab(s) orally once a day (at bedtime)  · Reclast Infusion , IV x 1 yearly:   · Vitamin D3 , 1 tablet by mouth 3x a week:   · IVIG monthly:   · amiodarone 100 mg oral tablet: 1 tab(s) orally once a day (in the morning)  · oxyCODONE 5 mg oral tablet: 1 tab(s) orally prn  · Eliquis 2.5 mg oral tablet: 1 tab(s) orally 2 times a day  · Procrit 10,000   ER MGMT .      BEST PRACTICE ISSUES.  . HOB ELEVATN.    .... Yes  . DIET  .   .... DASH 2L FR 5/26  . FREE WATER.   ....   .  IV FLUID.  .....   . PHARMAC DVT PPLX .    .... APIXABA 5/26 a 2.5 x 2 (nv af)   . NON PHARMAC DVT PPLX .      . STRESS ULCR PPLX .   .... PROTONIX 40 5/27/2025  . DATE/DM MGMT.   ..... See under Endocrine section   GENERAL DATA .   . COVID.         .... scv2 5/27/2025 (-)   . GOC.    ....    . ICU STAY.    .... no   . INFECTION PPLX .   ....   . ALLGY.   ....  nka  . WT.   .... 5/27/2025 64  . BMI.  .... 5/27/2025 25       [id number]  VITALS/GAS EXCHANGE/DRIPS    ABGS.   . 5/27 declined ordered abg   . 5/30/2025 8a 3l 744/51/86   .  VS/ PO/IO/ VENT/ DRIPS.  5/30/2025 afeb 63 100/40   5/30/2025 2l 975     XXXXXXXXXXXXXXXXXXXXXXXXXXXXXXXXXXX  PROBLEM ASSESSMENT RECOMMENDATIONS.  RESP.   . GAS EXCHANGE .   .... target PO 90-95%   .... 5/28/2025 check abg to see if shes retaining co2     . HYPERCAPNIA   .... 5/30/2025 8a 3l 744/51/86   .... 5/30/2025 tco2 34   .... 5/30/2025 abg analysis suggests metabolic alkalosis plus resp acidosis   .... 5/30/2025 as pt co sob will try noct bpap 12/6/16/28     . SOB  .... DD COPD CHF     . RO VTE  .... venous duplx 5/26 (-)    . COPD  .... GIVEN PREDNISONE 50 5/27-5/29/2025 X 5D  .... SOLU 40.2 5/29/2025   .... NACL 3% bid 5/29   .... DUONEB.4 P 5/27/2025  .... ALBUTEROL.4 5/27  .... ADVAIR 250 5/27   .... MONTELUKAST 10 5/30/2025   .... SPIRIVA 5/27    INFECTION.  . DATA  .... w 5/26-5/27-5/29-5/30 W 3 -  1.7 - 3.2- 2   .... pr 5/27/2025 pr .29   .... cxr 5/26 napd   .... ct ch 5/27/2025 cw 9/2/2023   ........ mild tracheobr secretns   ........ mosaic attenuation   ........ bl nodularity similar   ....... scattered linear and dependent atelectasis   ....... sm bl effsns   ....... mod indeterminate t8 veretebral body fr new   .... rvp 5/26/2025 rvp (-)   .... no obvious active infectn      CARDIAC.  . ORTHOSTATIC HYPOTENSION  .... has v significant orthostatic hypotension   .... MIDODRINE 10.2 5/26    . CAD    .... Trop 5/27/2025 tr 15       . CHF  .... 5/27/2025  1 + p edema  .... pbnp 5/2705/30/2025 pbnp 2185- 2330   .... tte 4/2025 mild ph  n vf   .... lasix   ........ 5/26 l 40.2   ....... 5/28/2025 l dced   .... torsemide 5/28/2025 t 20   .... check tte     . A fib   .... APIXABA 5/26 a 2.5 x 2   .... AMIODARONE 5/27/2025 5/26 A 100    HEMAT.  .... Plt 5/27/2025 plt 170   ....  monitor    . IMMUNOSUPPRESSIVE  .... LEFLUNAMIDE 10 5/27/2025    . ANEMIA  .... Hb 5/27-5/28-5/9-5/30/2025   .... HB 10- 8.9- 8.9  - 8.4   .... fa 5/27/2025 fa > 20   .... b12 5/27/2025 b12 1444   .... target hb 7 (+)    . LEUKOPENIA   .... w 5/26-5/27-5/28 W 3 -  1.7 - 2.2    GI.   . DIET .   .... dash 2 liter fr 5/26    RENAL.  . DATA  .... Na 5/27/2025 Na 139   .... CO2 5/27/2025 co2 30   .... Cr 5/27-5/28-5/29-5/30/2025   .... Cr 1.2 - 1.3 - 1.3 - 1.6   .... monitor    . HYPERKALEMIA   .... K 5/27-5/28/2025 K 5.7 - 4.6     NEURO.  . PAIN  .... TIZANADINE 5/26 T 2 HS     XXXXXXXXXXXXXXXXXXXXXX   SUMMARY BASELINE .     . 85 y/o F with PMH AFpaul Tafoya, COPD (not on home o2), CKD, aplastic anemia, PMR (chronic prednisone with AVN hip), HLD, HTN, DM pt of Dr Gallegos not on home ox or home cpap used to use trelegy lives with spouse quit 40 y 1/2 ppd  CC.   . 5/26/2025 SHORTNESS OF BREATH   . 5/26/2026 HYPOXEMIA   . 5/26/2025 WHEEZE   MAIN ISSUES.  . HYPOXIA 5/26/2025  . RESP FAILURE   .... 5/30/2025 nict bpap prdrd   . SOB  .... DD COPD CHF   .... venous duplx 5/26 (-)  . COPD ex   . A fib   .... APIXABA 5/26 a 2.5 x 2  . CHF  .... pbnp 5/27/2025 pbnp 2185  . ANEMIA  .... Hb 5/27/2025 HB 10  . LEUKOPENIA   .... w 5/26-5/27 W 3 -  1.7     PMH.   . ROBERTOon Eliquis,   . COPD (not on home o2),   . CKD,   . aplastic anemia,   . PMR (chronic prednisone with AVN hip),   . HLD,   . HTN,   . DM    PROCEDURES/DEVICES .      DISCUSSIONS.  .... Discussed with primary care and relevant consultants on an ongoing basis       TIME SPENT.  . Over 36 minutes aggregate care time spent on encounter; activities included   direct patient care, counseling and/or coordinating care reviewing notes, lab data/ imaging , discussion with multidisciplinary team/ patient  /family and explaining in detail risks, benefits, alternatives  of the recommendations     ROBERT HENRIQUEZ 82 5/27/2025 1942

## 2025-05-30 NOTE — PROGRESS NOTE ADULT - PROBLEM SELECTOR PLAN 4
Continue home eliquis 2.5 mg BID and amiodarone 100mg qd

## 2025-05-30 NOTE — PROGRESS NOTE ADULT - PROBLEM SELECTOR PLAN 8
Continue home simvastatin 10mg qd as atorvastatin 10mg

## 2025-05-30 NOTE — PROGRESS NOTE ADULT - PROBLEM SELECTOR PLAN 7
Continue home midodrine 10mg BID with hold parameters

## 2025-05-30 NOTE — CASE MANAGEMENT PROGRESS NOTE - NSCMPROGRESSNOTE_GEN_ALL_CORE
Discussed pt on rounds, pt remains acute, awaiting further testing, on o22LNC, c/o dyspnea, now on iv solumedrol. CM will continue to collaborate with interdisciplinary team and remain available to assist.

## 2025-05-30 NOTE — PROGRESS NOTE ADULT - SUBJECTIVE AND OBJECTIVE BOX
CHIEF COMPLAINT/ REASON FOR VISIT  .. Patient was seen to address the  issue listed under PROBLEM LIST which is located toward bottom of this note     MARTINEZ PATEL 2EAS 201 D1    Allergies    No Known Allergies    Intolerances        PAST MEDICAL & SURGICAL HISTORY:  COPD (chronic obstructive pulmonary disease)      DM (diabetes mellitus)  diet-controlled      PAF (paroxysmal atrial fibrillation)  s/p ablation      Hiatal hernia      H/O aplastic anemia      History of IBS      H/O osteoporosis      HLD (hyperlipidemia)      PMR (polymyalgia rheumatica)      History of basal cell cancer      Chronic kidney disease (CKD)      Hypotension      Presence of IVC filter      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      History of immunodeficiency      Lumbar compression fracture      H/O hiatal hernia      H/O pulmonary fibrosis      H/O sinus bradycardia      History of fracture of right hip  "unoperable"      S/P hysterectomy      S/P foot surgery      S/P knee surgery      After cataract  bilateral eye cataract surgically removed      Closed right hip fracture  rigth hip ORIF july 19 2016      S/P IVC filter  nov 2016      S/P carpal tunnel release  Right 2008 & 6/27/17      H/O kyphoplasty      Port-A-Cath in place  right chest          FAMILY HISTORY:  Family history of bladder cancer (Mother)    Family history of myocardial infarction (Father)    FH: atrial fibrillation (Father)        Home Medications:  amiodarone 200 mg oral tablet: 0.5 tab(s) orally once a day (27 May 2025 13:21)  Bentyl 10 mg oral capsule: 1 cap(s) orally 2 times a day, As Needed (27 May 2025 13:22)  Eliquis 2.5 mg oral tablet: 1 tab(s) orally 2 times a day (27 May 2025 12:39)  esomeprazole 20 mg oral delayed release capsule: 1 cap(s) orally once a day (27 May 2025 13:23)  folic acid 1 mg oral tablet: 1 tab(s) orally once a day (in the morning) (27 May 2025 12:39)  gabapentin 400 mg oral capsule: 1 cap(s) orally 2 times a day (27 May 2025 12:39)  IVIG monthly:  (27 May 2025 12:39)  leflunomide 10 mg oral tablet: 1 tab(s) orally once a day (27 May 2025 13:23)  midodrine 5 mg oral tablet: 2 tab(s) orally in the morning and 1 tab orally in the evening (27 May 2025 13:23)  montelukast 10 mg oral tablet: 1 tab(s) orally once a day (27 May 2025 13:23)  predniSONE 5 mg oral tablet: 1 tab(s) orally once a day (27 May 2025 13:22)  Procrit 10,000 units/mL preservative-free injectable solution: injectable once a week WEDNESDAY (27 May 2025 12:39)  Reclast Infusion , IV x 1 yearly:  (27 May 2025 13:22)  simvastatin 10 mg oral tablet: 1 tab(s) orally once a day (at bedtime) (27 May 2025 12:39)  tiZANidine: 2 tab(s) orally once a day (at bedtime) as needed for  muscle spasm (27 May 2025 13:22)  torsemide 20 mg oral tablet: 1 tab(s) orally once a day (in the morning) (27 May 2025 12:39)      MEDICATIONS  (STANDING):  albuterol    0.083% 2.5 milliGRAM(s) Nebulizer every 6 hours  albuterol    90 MICROgram(s) HFA Inhaler 1 Puff(s) Inhalation every 4 hours  aMIOdarone    Tablet 100 milliGRAM(s) Oral daily  apixaban 2.5 milliGRAM(s) Oral every 12 hours  atorvastatin 10 milliGRAM(s) Oral at bedtime  fluticasone propionate/ salmeterol 250-50 MICROgram(s) Diskus 1 Dose(s) Inhalation two times a day  gabapentin 400 milliGRAM(s) Oral two times a day  leflunomide 10 milliGRAM(s) Oral daily  methylPREDNISolone sodium succinate Injectable 40 milliGRAM(s) IV Push every 12 hours  midodrine. 10 milliGRAM(s) Oral <User Schedule>  pantoprazole    Tablet 40 milliGRAM(s) Oral before breakfast  sodium chloride 3%  Inhalation 4 milliLiter(s) Inhalation every 12 hours  tiotropium 2.5 MICROgram(s) Inhaler 2 Puff(s) Inhalation daily  tiZANidine 2 milliGRAM(s) Oral at bedtime  torsemide 20 milliGRAM(s) Oral daily    MEDICATIONS  (PRN):  albuterol/ipratropium for Nebulization 3 milliLiter(s) Nebulizer every 6 hours PRN Shortness of Breath and/or Wheezing  dicyclomine 10 milliGRAM(s) Oral two times a day before meals PRN bowel irritability  melatonin 3 milliGRAM(s) Oral at bedtime PRN Insomnia      Diet, Regular:   DASH/TLC Sodium & Cholesterol Restricted  2000mL Fluid Restriction ([id number]) (05-26-25 @ 23:27) [Active]          Vital Signs Last 24 Hrs  T(C): 36.6 (30 May 2025 04:41), Max: 37.3 (29 May 2025 20:08)  T(F): 97.9 (30 May 2025 04:41), Max: 99.1 (29 May 2025 20:08)  HR: 62 (30 May 2025 08:06) (49 - 63)  BP: 105/63 (30 May 2025 04:41) (99/64 - 116/62)  BP(mean): --  RR: 18 (30 May 2025 04:41) (18 - 18)  SpO2: 98% (30 May 2025 08:06) (96% - 100%)    Parameters below as of 30 May 2025 08:06  Patient On (Oxygen Delivery Method): nasal cannula, 2 LPM          05-29-25 @ 07:01  -  05-30-25 @ 07:00  --------------------------------------------------------  IN: 0 mL / OUT: 1750 mL / NET: -1750 mL              LABS:                        8.4    2.08  )-----------( 141      ( 30 May 2025 06:17 )             26.1     05-30    140  |  99  |  53[H]  ----------------------------<  129[H]  5.2   |  34[H]  |  1.60[H]    Ca    9.5      30 May 2025 06:17        Urinalysis Basic - ( 30 May 2025 06:17 )    Color: x / Appearance: x / SG: x / pH: x  Gluc: 129 mg/dL / Ketone: x  / Bili: x / Urobili: x   Blood: x / Protein: x / Nitrite: x   Leuk Esterase: x / RBC: x / WBC x   Sq Epi: x / Non Sq Epi: x / Bacteria: x            WBC:  WBC Count: 2.08 K/uL (05-30 @ 06:17)  WBC Count: 3.21 K/uL (05-29 @ 07:50)  WBC Count: 2.26 K/uL (05-28 @ 05:45)  WBC Count: 1.71 K/uL (05-27 @ 06:06)  WBC Count: 3.08 K/uL (05-26 @ 18:00)      MICROBIOLOGY:  RECENT CULTURES:                  Sodium:  Sodium: 140 mmol/L (05-30 @ 06:17)  Sodium: 138 mmol/L (05-29 @ 07:50)  Sodium: 141 mmol/L (05-28 @ 05:45)  Sodium: 139 mmol/L (05-27 @ 06:06)  Sodium: 140 mmol/L (05-26 @ 18:00)      1.60 mg/dL 05-30 @ 06:17  1.30 mg/dL 05-29 @ 07:50  1.30 mg/dL 05-28 @ 05:45  1.20 mg/dL 05-27 @ 06:06  1.20 mg/dL 05-26 @ 18:00      Hemoglobin:  Hemoglobin: 8.4 g/dL (05-30 @ 06:17)  Hemoglobin: 8.9 g/dL (05-29 @ 07:50)  Hemoglobin: 8.9 g/dL (05-28 @ 05:45)  Hemoglobin: 10.0 g/dL (05-27 @ 06:06)  Hemoglobin: 8.9 g/dL (05-26 @ 18:00)      Platelets: Platelet Count - Automated: 141 K/uL (05-30 @ 06:17)  Platelet Count - Automated: 146 K/uL (05-29 @ 07:50)  Platelet Count - Automated: 135 K/uL (05-28 @ 05:45)  Platelet Count - Automated: 170 K/uL (05-27 @ 06:06)  Platelet Count - Automated: 156 K/uL (05-26 @ 18:00)          Urinalysis Basic - ( 30 May 2025 06:17 )    Color: x / Appearance: x / SG: x / pH: x  Gluc: 129 mg/dL / Ketone: x  / Bili: x / Urobili: x   Blood: x / Protein: x / Nitrite: x   Leuk Esterase: x / RBC: x / WBC x   Sq Epi: x / Non Sq Epi: x / Bacteria: x        RADIOLOGY & ADDITIONAL STUDIES:      MICROBIOLOGY:  RECENT CULTURES:

## 2025-05-30 NOTE — PROGRESS NOTE ADULT - SUBJECTIVE AND OBJECTIVE BOX
Patient is a 84y old  Female who presents with a chief complaint of Shortness of breath (29 May 2025 18:06)       INTERVAL HPI/OVERNIGHT EVENTS: Patient seen and examined at bedside. Feeling better. No chest pain, palpitations sob is better bit still has cough and sob with exertion     MEDICATIONS  (STANDING):  albuterol    0.083% 2.5 milliGRAM(s) Nebulizer every 6 hours  albuterol    90 MICROgram(s) HFA Inhaler 1 Puff(s) Inhalation every 4 hours  aMIOdarone    Tablet 100 milliGRAM(s) Oral daily  apixaban 2.5 milliGRAM(s) Oral every 12 hours  atorvastatin 10 milliGRAM(s) Oral at bedtime  fluticasone propionate/ salmeterol 250-50 MICROgram(s) Diskus 1 Dose(s) Inhalation two times a day  gabapentin 400 milliGRAM(s) Oral two times a day  leflunomide 10 milliGRAM(s) Oral daily  methylPREDNISolone sodium succinate Injectable 40 milliGRAM(s) IV Push every 12 hours  midodrine. 10 milliGRAM(s) Oral <User Schedule>  pantoprazole    Tablet 40 milliGRAM(s) Oral before breakfast  sodium chloride 3%  Inhalation 4 milliLiter(s) Inhalation every 12 hours  tiotropium 2.5 MICROgram(s) Inhaler 2 Puff(s) Inhalation daily  tiZANidine 2 milliGRAM(s) Oral at bedtime  torsemide 20 milliGRAM(s) Oral daily    MEDICATIONS  (PRN):  albuterol/ipratropium for Nebulization 3 milliLiter(s) Nebulizer every 6 hours PRN Shortness of Breath and/or Wheezing  dicyclomine 10 milliGRAM(s) Oral two times a day before meals PRN bowel irritability  melatonin 3 milliGRAM(s) Oral at bedtime PRN Insomnia      Allergies    No Known Allergies    Intolerances        REVIEW OF SYSTEMS:  Per HPI. All other ROS Noted Negative   Vital Signs Last 24 Hrs  T(C): 36.6 (30 May 2025 04:41), Max: 37.3 (29 May 2025 20:08)  T(F): 97.9 (30 May 2025 04:41), Max: 99.1 (29 May 2025 20:08)  HR: 63 (30 May 2025 05:10) (49 - 63)  BP: 105/63 (30 May 2025 04:41) (99/64 - 116/62)  BP(mean): --  RR: 18 (30 May 2025 04:41) (18 - 18)  SpO2: 100% (30 May 2025 04:41) (96% - 100%)    Parameters below as of 30 May 2025 04:41  Patient On (Oxygen Delivery Method): nasal cannula  O2 Flow (L/min): 2      PHYSICAL EXAM:  General:  NAD, on 2L NC  Neurology: A&Ox3, nonfocal, ORTEGA x 4  Respiratory: diminished breath sounds B/L lung fields, no crackles/wheezing noted  CV: +S1S2, RRR  Abdominal: Soft, NT, ND +BS  Extremities: no edema B/L LE, + peripheral pulses  Skin: warm, dry     LABS:                        8.4    2.08  )-----------( 141      ( 30 May 2025 06:17 )             26.1     30 May 2025 06:17    140    |  99     |  53     ----------------------------<  129    5.2     |  34     |  1.60     Ca    9.5        30 May 2025 06:17        CAPILLARY BLOOD GLUCOSE        BLOOD CULTURE    RADIOLOGY & ADDITIONAL TESTS:    Imaging Personally Reviewed:  [ ] YES     Consultant(s) Notes Reviewed:      Care Discussed with Consultants/Other Providers:

## 2025-05-30 NOTE — PROGRESS NOTE ADULT - PROBLEM SELECTOR PLAN 5
Takes prednisone 5mg daily, chronically, will continue increase to 50mg qd x 5 days for potential COPD exacerbation. Return back to home 5mg qd when increased dose no longer clinically warranted or completed course.     Continue home leflunomide (10mg), pharmacy only carries 20mg and cannot split tabs. Patient brought home medication- verified by pharmacy.
Takes prednisone 5mg daily, chronically, will continue increase to 50mg qd x 5 days for potential COPD exacerbation. Return back to home 5mg qd when increased dose no longer clinically warranted or completed course.     Continue home leflunomide (10mg), pharmacy only carries 20mg and cannot split tabs. Patient brought home medication- verified by pharmacy.
Takes prednisone 5mg daily, chronically, will increase to solumedrol for COPD exacerbation. Return back to home 5mg qd when increased dose no longer clinically warranted or completed course.     Continue home leflunomide (10mg), pharmacy only carries 20mg and cannot split tabs. Patient brought home medication- verified by pharmacy.
Takes prednisone 5mg daily, chronically, will increase to solumedrol for COPD exacerbation. Return back to home 5mg qd when increased dose no longer clinically warranted or completed course.     Continue home leflunomide (10mg), pharmacy only carries 20mg and cannot split tabs. Patient brought home medication- verified by pharmacy.

## 2025-05-30 NOTE — PROGRESS NOTE ADULT - PROBLEM SELECTOR PLAN 9
DVT: c/w home eliquis 2.5mg BID

## 2025-05-30 NOTE — PROGRESS NOTE ADULT - ASSESSMENT
84F Hx HTN, HLD, DM, PAF, CKD, aplastic anemia presented w/ worsening SOB and b/l LE edema; admitted for likely HFpEF exacerbation    - Pt p/w shortness of breath, multifactorial in nature, likely this is secondary to a COPD  - She has chronic lower extremity edema. Although now resolved   - BNP:  <--2185  - S/p Lasix 40mg IV BID, now on Torsemide 20 daily   - Follow-up with pulmonary for COPD management. On IV steroids   - Pt reports SOB this am, Would repeat CXR  - Repeat TTE pending  - Strict I&Os, daily weights, fluid restriction    - EKG NSR w/ PACs  - No evidence of any active ischemia   - Continue Simvastatin    - Known Afib  - TELE: Sr 50-60s PAC, freq atrial bigeminy  - Continue Eliquis, Amiodarone    - Has very significant orthostatic hypotension  - Would check orthostatics daily  - BP stable and controlled   - Continue Midodrine    - Monitor and replete lytes, keep K>4, Mg>2.  - Will continue to follow.    Oliver Olvera, MS FNP, AGACNP  Nurse Practitioner- Cardiology   Please call on TEAMS     84F Hx HTN, HLD, DM, PAF, CKD, aplastic anemia presented w/ worsening SOB and b/l LE edema; admitted for likely HFpEF exacerbation    - Pt p/w shortness of breath, multifactorial in nature, likely this is secondary to a COPD  - She has chronic lower extremity edema. Although now resolved   - BNP:  <--2185  - S/p Lasix 40mg IV BID, now on Torsemide 20 daily   - Creatinine:  <--1.60,  <--1.30. Can hold Torsemide given KIMBERLY  - Follow-up with pulmonary for COPD management. On IV steroids   - Pt reports SOB this am, Would repeat CXR  - Repeat TTE pending  - Strict I&Os, daily weights, fluid restriction    - EKG NSR w/ PACs  - No evidence of any active ischemia   - Continue Simvastatin    - Known Afib  - TELE: Sr 50-60s PAC, freq atrial bigeminy  - Continue Eliquis, Amiodarone    - Has very significant orthostatic hypotension  - Would check orthostatics daily  - BP stable and controlled   - Continue Midodrine    - Monitor and replete lytes, keep K>4, Mg>2.  - Will continue to follow.    Oliver Olvera, MS FNP, AGACNP  Nurse Practitioner- Cardiology   Please call on TEAMS

## 2025-05-31 LAB
ALBUMIN SERPL ELPH-MCNC: 3.7 G/DL — SIGNIFICANT CHANGE UP (ref 3.3–5)
ALP SERPL-CCNC: 33 U/L — LOW (ref 40–120)
ALT FLD-CCNC: 18 U/L — SIGNIFICANT CHANGE UP (ref 12–78)
ANION GAP SERPL CALC-SCNC: 6 MMOL/L — SIGNIFICANT CHANGE UP (ref 5–17)
AST SERPL-CCNC: 4 U/L — LOW (ref 15–37)
BASOPHILS # BLD AUTO: 0 K/UL — SIGNIFICANT CHANGE UP (ref 0–0.2)
BASOPHILS NFR BLD AUTO: 0 % — SIGNIFICANT CHANGE UP (ref 0–2)
BILIRUB SERPL-MCNC: 0.9 MG/DL — SIGNIFICANT CHANGE UP (ref 0.2–1.2)
BUN SERPL-MCNC: 59 MG/DL — HIGH (ref 7–23)
CALCIUM SERPL-MCNC: 9.9 MG/DL — SIGNIFICANT CHANGE UP (ref 8.5–10.1)
CHLORIDE SERPL-SCNC: 98 MMOL/L — SIGNIFICANT CHANGE UP (ref 96–108)
CO2 SERPL-SCNC: 35 MMOL/L — HIGH (ref 22–31)
CREAT ?TM UR-MCNC: 82 MG/DL — SIGNIFICANT CHANGE UP
CREAT SERPL-MCNC: 1.4 MG/DL — HIGH (ref 0.5–1.3)
EGFR: 38 ML/MIN/1.73M2 — LOW
EGFR: 38 ML/MIN/1.73M2 — LOW
EOSINOPHIL # BLD AUTO: 0 K/UL — SIGNIFICANT CHANGE UP (ref 0–0.5)
EOSINOPHIL NFR BLD AUTO: 0 % — SIGNIFICANT CHANGE UP (ref 0–6)
GLUCOSE SERPL-MCNC: 130 MG/DL — HIGH (ref 70–99)
HCT VFR BLD CALC: 26 % — LOW (ref 34.5–45)
HGB BLD-MCNC: 8.2 G/DL — LOW (ref 11.5–15.5)
IMM GRANULOCYTES NFR BLD AUTO: 1.1 % — HIGH (ref 0–0.9)
LYMPHOCYTES # BLD AUTO: 0.33 K/UL — LOW (ref 1–3.3)
LYMPHOCYTES # BLD AUTO: 11.6 % — LOW (ref 13–44)
MAGNESIUM SERPL-MCNC: 1.8 MG/DL — SIGNIFICANT CHANGE UP (ref 1.6–2.6)
MCHC RBC-ENTMCNC: 31.2 PG — SIGNIFICANT CHANGE UP (ref 27–34)
MCHC RBC-ENTMCNC: 31.5 G/DL — LOW (ref 32–36)
MCV RBC AUTO: 98.9 FL — SIGNIFICANT CHANGE UP (ref 80–100)
MONOCYTES # BLD AUTO: 0.58 K/UL — SIGNIFICANT CHANGE UP (ref 0–0.9)
MONOCYTES NFR BLD AUTO: 20.4 % — HIGH (ref 2–14)
NEUTROPHILS # BLD AUTO: 1.9 K/UL — SIGNIFICANT CHANGE UP (ref 1.8–7.4)
NEUTROPHILS NFR BLD AUTO: 66.9 % — SIGNIFICANT CHANGE UP (ref 43–77)
NRBC BLD AUTO-RTO: 1 /100 WBCS — HIGH (ref 0–0)
OSMOLALITY UR: 454 MOSM/KG — SIGNIFICANT CHANGE UP (ref 50–1200)
PHOSPHATE SERPL-MCNC: 3.6 MG/DL — SIGNIFICANT CHANGE UP (ref 2.5–4.5)
PLATELET # BLD AUTO: 156 K/UL — SIGNIFICANT CHANGE UP (ref 150–400)
POTASSIUM SERPL-MCNC: 4.6 MMOL/L — SIGNIFICANT CHANGE UP (ref 3.5–5.3)
POTASSIUM SERPL-SCNC: 4.6 MMOL/L — SIGNIFICANT CHANGE UP (ref 3.5–5.3)
POTASSIUM UR-SCNC: 33.7 MMOL/L — SIGNIFICANT CHANGE UP
PROT ?TM UR-MCNC: 10 MG/DL — SIGNIFICANT CHANGE UP (ref 0–12)
PROT SERPL-MCNC: 6.1 G/DL — SIGNIFICANT CHANGE UP (ref 6–8.3)
PROT/CREAT UR-RTO: 0.1 RATIO — SIGNIFICANT CHANGE UP (ref 0–0.2)
RBC # BLD: 2.63 M/UL — LOW (ref 3.8–5.2)
RBC # FLD: 24.1 % — HIGH (ref 10.3–14.5)
SODIUM SERPL-SCNC: 139 MMOL/L — SIGNIFICANT CHANGE UP (ref 135–145)
SODIUM UR-SCNC: 27 MMOL/L — SIGNIFICANT CHANGE UP
UUN UR-MCNC: 828 MG/DL — SIGNIFICANT CHANGE UP
WBC # BLD: 2.9 K/UL — LOW (ref 3.8–10.5)
WBC # FLD AUTO: 2.9 K/UL — LOW (ref 3.8–10.5)

## 2025-05-31 PROCEDURE — 99233 SBSQ HOSP IP/OBS HIGH 50: CPT

## 2025-05-31 PROCEDURE — 71250 CT THORAX DX C-: CPT | Mod: 26

## 2025-05-31 RX ORDER — ALBUMIN (HUMAN) 12.5 G/50ML
50 INJECTION, SOLUTION INTRAVENOUS ONCE
Refills: 0 | Status: COMPLETED | OUTPATIENT
Start: 2025-05-31 | End: 2025-05-31

## 2025-05-31 RX ORDER — METHYLPREDNISOLONE ACETATE 80 MG/ML
40 INJECTION, SUSPENSION INTRA-ARTICULAR; INTRALESIONAL; INTRAMUSCULAR; SOFT TISSUE DAILY
Refills: 0 | Status: DISCONTINUED | OUTPATIENT
Start: 2025-05-31 | End: 2025-06-02

## 2025-05-31 RX ORDER — FUROSEMIDE 10 MG/ML
80 INJECTION INTRAMUSCULAR; INTRAVENOUS ONCE
Refills: 0 | Status: COMPLETED | OUTPATIENT
Start: 2025-05-31 | End: 2025-05-31

## 2025-05-31 RX ADMIN — TIOTROPIUM BROMIDE INHALATION SPRAY 2 PUFF(S): 3.12 SPRAY, METERED RESPIRATORY (INHALATION) at 09:48

## 2025-05-31 RX ADMIN — Medication 1 DOSE(S): at 17:53

## 2025-05-31 RX ADMIN — Medication 4 MILLILITER(S): at 07:11

## 2025-05-31 RX ADMIN — GABAPENTIN 400 MILLIGRAM(S): 400 CAPSULE ORAL at 05:47

## 2025-05-31 RX ADMIN — TIZANIDINE 2 MILLIGRAM(S): 4 TABLET ORAL at 21:04

## 2025-05-31 RX ADMIN — FUROSEMIDE 80 MILLIGRAM(S): 10 INJECTION INTRAMUSCULAR; INTRAVENOUS at 14:49

## 2025-05-31 RX ADMIN — ALBUMIN (HUMAN) 50 MILLILITER(S): 12.5 INJECTION, SOLUTION INTRAVENOUS at 14:53

## 2025-05-31 RX ADMIN — APIXABAN 2.5 MILLIGRAM(S): 2.5 TABLET, FILM COATED ORAL at 17:09

## 2025-05-31 RX ADMIN — Medication 40 MILLIGRAM(S): at 06:08

## 2025-05-31 RX ADMIN — APIXABAN 2.5 MILLIGRAM(S): 2.5 TABLET, FILM COATED ORAL at 05:47

## 2025-05-31 RX ADMIN — METHYLPREDNISOLONE ACETATE 40 MILLIGRAM(S): 80 INJECTION, SUSPENSION INTRA-ARTICULAR; INTRALESIONAL; INTRAMUSCULAR; SOFT TISSUE at 17:10

## 2025-05-31 RX ADMIN — Medication 1 DOSE(S): at 09:48

## 2025-05-31 RX ADMIN — AMIODARONE HYDROCHLORIDE 100 MILLIGRAM(S): 50 INJECTION, SOLUTION INTRAVENOUS at 05:48

## 2025-05-31 RX ADMIN — Medication 4 MILLILITER(S): at 19:26

## 2025-05-31 RX ADMIN — MIDODRINE HYDROCHLORIDE 10 MILLIGRAM(S): 5 TABLET ORAL at 17:09

## 2025-05-31 RX ADMIN — MONTELUKAST SODIUM 10 MILLIGRAM(S): 10 TABLET ORAL at 12:04

## 2025-05-31 RX ADMIN — LEFLUNOMIDE 10 MILLIGRAM(S): 10 TABLET ORAL at 12:06

## 2025-05-31 RX ADMIN — Medication 2.5 MILLIGRAM(S): at 19:26

## 2025-05-31 RX ADMIN — MIDODRINE HYDROCHLORIDE 10 MILLIGRAM(S): 5 TABLET ORAL at 06:08

## 2025-05-31 RX ADMIN — Medication 2.5 MILLIGRAM(S): at 13:51

## 2025-05-31 RX ADMIN — Medication 20 MILLIGRAM(S): at 05:48

## 2025-05-31 RX ADMIN — METHYLPREDNISOLONE ACETATE 40 MILLIGRAM(S): 80 INJECTION, SUSPENSION INTRA-ARTICULAR; INTRALESIONAL; INTRAMUSCULAR; SOFT TISSUE at 05:48

## 2025-05-31 RX ADMIN — GABAPENTIN 400 MILLIGRAM(S): 400 CAPSULE ORAL at 17:09

## 2025-05-31 RX ADMIN — ATORVASTATIN CALCIUM 10 MILLIGRAM(S): 80 TABLET, FILM COATED ORAL at 21:03

## 2025-05-31 RX ADMIN — Medication 2.5 MILLIGRAM(S): at 07:11

## 2025-05-31 NOTE — PROGRESS NOTE ADULT - ASSESSMENT
84F Hx HTN, HLD, DM, PAF, CKD, aplastic anemia presented w/ worsening SOB and b/l LE edema; admitted for likely HFpEF exacerbation    - Pt p/w shortness of breath, multifactorial in nature, likely this is secondary to a COPD  - She has chronic lower extremity edema. Although now resolved   - BNP:  <--2185  - S/p Lasix 40mg IV BID, now on Torsemide 20 daily. May give additional lasix prn once if volume overloaded on imaging or worsening SOB  - Creatinine improving   - Follow-up with pulmonary for COPD management. On IV steroids , bronchodilators  - Continued SOB. CXR similar to previous (personal read). F/u CT chest   - Recent TTE (4/8/2025): EF 63%, LV hypertrophy, intermediate diastolic dysfunction, mild pHTN.   - no need for repeat while inpatient  - Strict I&Os, daily weights, fluid restriction    - EKG NSR w/ PACs  - No evidence of any active ischemia   - Continue Simvastatin    - Known Afib  - TELE: Sr 50-60s PAC, freq atrial bigeminy  - Continue Eliquis, Amiodarone    - Has very significant orthostatic hypotension  - Would check orthostatics daily  - BP stable and controlled   - Continue Midodrine    - Monitor and replete lytes, keep K>4, Mg>2.  - Will continue to follow.

## 2025-05-31 NOTE — PROGRESS NOTE ADULT - SUBJECTIVE AND OBJECTIVE BOX
[facility]    Sai Valle, Manuel, Girish, El, David      [phone]    CHIEF COMPLAINT: Patient is a 84y old  Female who presents with a chief complaint of Shortness of breath (31 May 2025 09:00)      Follow Up:     Interim history: No acute events overnight. Patient still reports mild SOB, satting well on 2L NC. Did not use Bipap/Cpap overnight. Leg edema improving. Denies CP, palpitations.     MEDICATIONS  (STANDING):  albuterol    0.083% 2.5 milliGRAM(s) Nebulizer every 6 hours  albuterol    90 MICROgram(s) HFA Inhaler 1 Puff(s) Inhalation every 4 hours  aMIOdarone    Tablet 100 milliGRAM(s) Oral daily  apixaban 2.5 milliGRAM(s) Oral every 12 hours  atorvastatin 10 milliGRAM(s) Oral at bedtime  fluticasone propionate/ salmeterol 250-50 MICROgram(s) Diskus 1 Dose(s) Inhalation two times a day  gabapentin 400 milliGRAM(s) Oral two times a day  leflunomide 10 milliGRAM(s) Oral daily  methylPREDNISolone sodium succinate Injectable 40 milliGRAM(s) IV Push every 12 hours  midodrine. 10 milliGRAM(s) Oral <User Schedule>  montelukast 10 milliGRAM(s) Oral daily  pantoprazole    Tablet 40 milliGRAM(s) Oral before breakfast  sodium chloride 3%  Inhalation 4 milliLiter(s) Inhalation every 12 hours  tiotropium 2.5 MICROgram(s) Inhaler 2 Puff(s) Inhalation daily  tiZANidine 2 milliGRAM(s) Oral at bedtime  torsemide 20 milliGRAM(s) Oral daily    MEDICATIONS  (PRN):  albuterol/ipratropium for Nebulization 3 milliLiter(s) Nebulizer every 6 hours PRN Shortness of Breath and/or Wheezing  dicyclomine 10 milliGRAM(s) Oral two times a day before meals PRN bowel irritability  melatonin 3 milliGRAM(s) Oral at bedtime PRN Insomnia      REVIEW OF SYSTEMS:   CONSTITUTIONAL: denies fever, chills, fatigue, +weakness  HEENT: denies blurred vision, sore throat  CARDIOVASCULAR: denies chest pain, chest pressure, palpitations  RESPIRATORY: + shortness of breath, denies sputum production  GASTROINTESTINAL: denies nausea, vomiting, diarrhea, abdominal pain  GENITOURINARY: denies dysuria, discharge  NEUROLOGICAL: denies numbness, headache, focal weakness  MUSCULOSKELETAL: denies new joint pain, minimal muscle aches  HEMATOLOGIC: denies gross bleeding, bruising      Vital Signs Last 24 Hrs  T(C): 36.7 (31 May 2025 06:16), Max: 36.8 (30 May 2025 20:27)  T(F): 98 (31 May 2025 06:16), Max: 98.3 (30 May 2025 20:27)  HR: 75 (31 May 2025 07:51) (49 - 84)  BP: 112/65 (31 May 2025 06:16) (100/44 - 112/65)  BP(mean): --  RR: 18 (31 May 2025 06:16) (18 - 18)  SpO2: 98% (31 May 2025 07:51) (96% - 98%)    Parameters below as of 31 May 2025 07:51  Patient On (Oxygen Delivery Method): nasal cannula, 2 LPM        I&O's Summary      Telemetry past 24h:      PHYSICAL EXAM:  Constitutional: NAD, awake and alert  HEENT: Moist Mucous Membranes, Anicteric  Pulmonary: Non-labored, breath sounds are clear bilaterally, No wheezing, rales or rhonchi. + decreased breath sounds  Cardiovascular: Regular, S1 and S2, No murmurs, No rubs, gallops or clicks  Gastrointestinal:  soft, nontender, nondistended   Lymph: No peripheral edema. No lymphadenopathy.   Skin: No visible rashes  Psych:  Mood & affect appropriate    LABS: All Labs Reviewed:                        8.2    2.90  )-----------( 156      ( 31 May 2025 06:50 )             26.0                         8.4    2.08  )-----------( 141      ( 30 May 2025 06:17 )             26.1                         8.9    3.21  )-----------( 146      ( 29 May 2025 07:50 )             28.3     31 May 2025 06:50    139    |  98     |  59     ----------------------------<  130    4.6     |  35     |  1.40   30 May 2025 06:17    140    |  99     |  53     ----------------------------<  129    5.2     |  34     |  1.60   29 May 2025 07:50    138    |  97     |  47     ----------------------------<  111    4.5     |  35     |  1.30     Ca    9.9        31 May 2025 06:50  Ca    9.5        30 May 2025 06:17  Ca    9.3        29 May 2025 07:50  Phos  3.6       31 May 2025 06:50  Mg     1.8       31 May 2025 06:50    TPro  6.1    /  Alb  3.7    /  TBili  0.9    /  DBili  x      /  AST  4      /  ALT  18     /  AlkPhos  33     31 May 2025 06:50

## 2025-05-31 NOTE — PROGRESS NOTE ADULT - ATTENDING COMMENTS
84F Hx HTN, HLD, DM, PAF, CKD, aplastic anemia presented w/ worsening SOB and b/l LE edema; admitted for likely HFpEF exacerbation    - Pt p/w shortness of breath, multifactorial in nature, seemed mostly likely secondary to a COPD  - chronic lower extremity edema resolved    - S/p Lasix 40mg IV BID, now on Torsemide 20 daily   - Creatinine:  <--1.60,  <--1.30.    - Pt reports worsened SOB this am, repeated ct chest  - official read pending but effusions appear larger and will give iv lasix  - Repeat TTE pending   - No evidence of any active ischemia    - Known Afib   - Continue Eliquis, Amiodarone   - Has very significant orthostatic hypotension  - Would check orthostatics daily  - BP stable and controlled   - Continue Midodrine.

## 2025-05-31 NOTE — PROGRESS NOTE ADULT - SUBJECTIVE AND OBJECTIVE BOX
Patient is a 84y old  Female who presents with a chief complaint of Shortness of breath (30 May 2025 14:05)       INTERVAL HPI/OVERNIGHT EVENTS: Patient seen and examined at bedside. Still feels sob, O2 via NC. No chest pain, abdominal pain, n/v/d/c     MEDICATIONS  (STANDING):  albuterol    0.083% 2.5 milliGRAM(s) Nebulizer every 6 hours  albuterol    90 MICROgram(s) HFA Inhaler 1 Puff(s) Inhalation every 4 hours  aMIOdarone    Tablet 100 milliGRAM(s) Oral daily  apixaban 2.5 milliGRAM(s) Oral every 12 hours  atorvastatin 10 milliGRAM(s) Oral at bedtime  fluticasone propionate/ salmeterol 250-50 MICROgram(s) Diskus 1 Dose(s) Inhalation two times a day  gabapentin 400 milliGRAM(s) Oral two times a day  leflunomide 10 milliGRAM(s) Oral daily  methylPREDNISolone sodium succinate Injectable 40 milliGRAM(s) IV Push every 12 hours  midodrine. 10 milliGRAM(s) Oral <User Schedule>  montelukast 10 milliGRAM(s) Oral daily  pantoprazole    Tablet 40 milliGRAM(s) Oral before breakfast  sodium chloride 3%  Inhalation 4 milliLiter(s) Inhalation every 12 hours  tiotropium 2.5 MICROgram(s) Inhaler 2 Puff(s) Inhalation daily  tiZANidine 2 milliGRAM(s) Oral at bedtime  torsemide 20 milliGRAM(s) Oral daily    MEDICATIONS  (PRN):  albuterol/ipratropium for Nebulization 3 milliLiter(s) Nebulizer every 6 hours PRN Shortness of Breath and/or Wheezing  dicyclomine 10 milliGRAM(s) Oral two times a day before meals PRN bowel irritability  melatonin 3 milliGRAM(s) Oral at bedtime PRN Insomnia      Allergies    No Known Allergies    Intolerances        REVIEW OF SYSTEMS:  CONSTITUTIONAL: No fever or fatigue  EYES: No eye pain, visual disturbances, or discharge  ENMT:  No difficulty hearing, tinnitus, vertigo; No sinus or throat pain  NECK: No pain or stiffness  RESPIRATORY: No cough, wheezing, chills or hemoptysis; + shortness of breath  CARDIOVASCULAR: No chest pain, palpitations, dizziness, or leg swelling  GASTROINTESTINAL: No abdominal or epigastric pain. No nausea, vomiting, or hematemesis; No diarrhea or constipation.   GENITOURINARY: No dysuria, frequency, hematuria, or incontinence  NEUROLOGICAL: No headaches, memory loss, loss of strength, numbness, or tremors  SKIN: No itching, burning, rashes, or lesions     Vital Signs Last 24 Hrs  T(C): 36.7 (31 May 2025 06:16), Max: 36.8 (30 May 2025 20:27)  T(F): 98 (31 May 2025 06:16), Max: 98.3 (30 May 2025 20:27)  HR: 75 (31 May 2025 07:51) (49 - 84)  BP: 112/65 (31 May 2025 06:16) (100/44 - 112/65)  BP(mean): --  RR: 18 (31 May 2025 06:16) (18 - 18)  SpO2: 98% (31 May 2025 07:51) (96% - 98%)    Parameters below as of 31 May 2025 07:51  Patient On (Oxygen Delivery Method): nasal cannula, 2 LPM        PHYSICAL EXAM:  GENERAL: NAD, Awake, alert, able to speak in full sentences   HEAD:  Atraumatic, Normocephalic  EYES: EOMI, PERRLA, conjunctiva and sclera clear  ENMT: No tonsillar erythema, exudates, or enlargement; Moist mucous membranes  NECK: Supple, No JVD, Normal thyroid  NERVOUS SYSTEM:  Alert & Oriented X3, No focal motor/sensory deficits noted   CHEST/LUNG: Decreased bibasilar breath sounds no wheezing   HEART: Regular rate and rhythm; No murmurs, rubs, or gallops  ABDOMEN: Soft, Nontender, Nondistended; Bowel sounds present  EXTREMITIES:  2+ Peripheral Pulses, No clubbing, cyanosis  LYMPH: No lymphadenopathy noted  SKIN: No rashes or lesions    LABS:                        8.2    2.90  )-----------( 156      ( 31 May 2025 06:50 )             26.0       Ca    9.5        30 May 2025 06:17        CAPILLARY BLOOD GLUCOSE        BLOOD CULTURE    RADIOLOGY & ADDITIONAL TESTS:    Imaging Personally Reviewed:  [ ] YES     Consultant(s) Notes Reviewed:      Care Discussed with Consultants/Other Providers:

## 2025-05-31 NOTE — PROGRESS NOTE ADULT - SUBJECTIVE AND OBJECTIVE BOX
CHIEF COMPLAINT/ REASON FOR VISIT  .. Patient was seen to address the  issue listed under PROBLEM LIST which is located toward bottom of this note     MARTINEZ PATEL 2EAS 201 D1    Allergies    No Known Allergies    Intolerances        PAST MEDICAL & SURGICAL HISTORY:  COPD (chronic obstructive pulmonary disease)      DM (diabetes mellitus)  diet-controlled      PAF (paroxysmal atrial fibrillation)  s/p ablation      Hiatal hernia      H/O aplastic anemia      History of IBS      H/O osteoporosis      HLD (hyperlipidemia)      PMR (polymyalgia rheumatica)      History of basal cell cancer      Chronic kidney disease (CKD)      Hypotension      Presence of IVC filter      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      History of immunodeficiency      Lumbar compression fracture      H/O hiatal hernia      H/O pulmonary fibrosis      H/O sinus bradycardia      History of fracture of right hip  "unoperable"      S/P hysterectomy      S/P foot surgery      S/P knee surgery      After cataract  bilateral eye cataract surgically removed      Closed right hip fracture  rigth hip ORIF july 19 2016      S/P IVC filter  nov 2016      S/P carpal tunnel release  Right 2008 & 6/27/17      H/O kyphoplasty      Port-A-Cath in place  right chest          FAMILY HISTORY:  Family history of bladder cancer (Mother)    Family history of myocardial infarction (Father)    FH: atrial fibrillation (Father)        Home Medications:  amiodarone 200 mg oral tablet: 0.5 tab(s) orally once a day (27 May 2025 13:21)  Bentyl 10 mg oral capsule: 1 cap(s) orally 2 times a day, As Needed (27 May 2025 13:22)  Eliquis 2.5 mg oral tablet: 1 tab(s) orally 2 times a day (27 May 2025 12:39)  esomeprazole 20 mg oral delayed release capsule: 1 cap(s) orally once a day (27 May 2025 13:23)  folic acid 1 mg oral tablet: 1 tab(s) orally once a day (in the morning) (27 May 2025 12:39)  gabapentin 400 mg oral capsule: 1 cap(s) orally 2 times a day (27 May 2025 12:39)  IVIG monthly:  (27 May 2025 12:39)  leflunomide 10 mg oral tablet: 1 tab(s) orally once a day (27 May 2025 13:23)  midodrine 5 mg oral tablet: 2 tab(s) orally in the morning and 1 tab orally in the evening (27 May 2025 13:23)  montelukast 10 mg oral tablet: 1 tab(s) orally once a day (27 May 2025 13:23)  predniSONE 5 mg oral tablet: 1 tab(s) orally once a day (27 May 2025 13:22)  Procrit 10,000 units/mL preservative-free injectable solution: injectable once a week WEDNESDAY (27 May 2025 12:39)  Reclast Infusion , IV x 1 yearly:  (27 May 2025 13:22)  simvastatin 10 mg oral tablet: 1 tab(s) orally once a day (at bedtime) (27 May 2025 12:39)  tiZANidine: 2 tab(s) orally once a day (at bedtime) as needed for  muscle spasm (27 May 2025 13:22)  torsemide 20 mg oral tablet: 1 tab(s) orally once a day (in the morning) (27 May 2025 12:39)      MEDICATIONS  (STANDING):  albuterol    0.083% 2.5 milliGRAM(s) Nebulizer every 6 hours  albuterol    90 MICROgram(s) HFA Inhaler 1 Puff(s) Inhalation every 4 hours  aMIOdarone    Tablet 100 milliGRAM(s) Oral daily  apixaban 2.5 milliGRAM(s) Oral every 12 hours  atorvastatin 10 milliGRAM(s) Oral at bedtime  fluticasone propionate/ salmeterol 250-50 MICROgram(s) Diskus 1 Dose(s) Inhalation two times a day  gabapentin 400 milliGRAM(s) Oral two times a day  leflunomide 10 milliGRAM(s) Oral daily  methylPREDNISolone sodium succinate Injectable 40 milliGRAM(s) IV Push every 12 hours  midodrine. 10 milliGRAM(s) Oral <User Schedule>  montelukast 10 milliGRAM(s) Oral daily  pantoprazole    Tablet 40 milliGRAM(s) Oral before breakfast  sodium chloride 3%  Inhalation 4 milliLiter(s) Inhalation every 12 hours  tiotropium 2.5 MICROgram(s) Inhaler 2 Puff(s) Inhalation daily  tiZANidine 2 milliGRAM(s) Oral at bedtime  torsemide 20 milliGRAM(s) Oral daily    MEDICATIONS  (PRN):  albuterol/ipratropium for Nebulization 3 milliLiter(s) Nebulizer every 6 hours PRN Shortness of Breath and/or Wheezing  dicyclomine 10 milliGRAM(s) Oral two times a day before meals PRN bowel irritability  melatonin 3 milliGRAM(s) Oral at bedtime PRN Insomnia      Diet, Regular:   DASH/TLC Sodium & Cholesterol Restricted  2000mL Fluid Restriction ([id number]) (05-26-25 @ 23:27) [Active]          Vital Signs Last 24 Hrs  T(C): 36.7 (31 May 2025 06:16), Max: 36.8 (30 May 2025 20:27)  T(F): 98 (31 May 2025 06:16), Max: 98.3 (30 May 2025 20:27)  HR: 75 (31 May 2025 07:51) (49 - 84)  BP: 112/65 (31 May 2025 06:16) (100/44 - 112/65)  BP(mean): --  RR: 18 (31 May 2025 06:16) (18 - 18)  SpO2: 98% (31 May 2025 07:51) (96% - 98%)    Parameters below as of 31 May 2025 07:51  Patient On (Oxygen Delivery Method): nasal cannula, 2 LPM                  LABS:                        8.2    2.90  )-----------( 156      ( 31 May 2025 06:50 )             26.0     05-31    139  |  98  |  59[H]  ----------------------------<  130[H]  4.6   |  35[H]  |  1.40[H]    Ca    9.9      31 May 2025 06:50  Phos  3.6     05-31  Mg     1.8     05-31    TPro  6.1  /  Alb  3.7  /  TBili  0.9  /  DBili  x   /  AST  4[L]  /  ALT  18  /  AlkPhos  33[L]  05-31      Urinalysis Basic - ( 31 May 2025 06:50 )    Color: x / Appearance: x / SG: x / pH: x  Gluc: 130 mg/dL / Ketone: x  / Bili: x / Urobili: x   Blood: x / Protein: x / Nitrite: x   Leuk Esterase: x / RBC: x / WBC x   Sq Epi: x / Non Sq Epi: x / Bacteria: x        ABG - ( 30 May 2025 08:58 )  pH, Arterial: 7.44  pH, Blood: x     /  pCO2: 51    /  pO2: 86    / HCO3: 35    / Base Excess: 10.4  /  SaO2: 99.0                WBC:  WBC Count: 2.90 K/uL (05-31 @ 06:50)  WBC Count: 2.08 K/uL (05-30 @ 06:17)  WBC Count: 3.21 K/uL (05-29 @ 07:50)  WBC Count: 2.26 K/uL (05-28 @ 05:45)      MICROBIOLOGY:  RECENT CULTURES:                  Sodium:  Sodium: 139 mmol/L (05-31 @ 06:50)  Sodium: 140 mmol/L (05-30 @ 06:17)  Sodium: 138 mmol/L (05-29 @ 07:50)  Sodium: 141 mmol/L (05-28 @ 05:45)      1.40 mg/dL 05-31 @ 06:50  1.60 mg/dL 05-30 @ 06:17  1.30 mg/dL 05-29 @ 07:50  1.30 mg/dL 05-28 @ 05:45      Hemoglobin:  Hemoglobin: 8.2 g/dL (05-31 @ 06:50)  Hemoglobin: 8.4 g/dL (05-30 @ 06:17)  Hemoglobin: 8.9 g/dL (05-29 @ 07:50)  Hemoglobin: 8.9 g/dL (05-28 @ 05:45)      Platelets: Platelet Count - Automated: 156 K/uL (05-31 @ 06:50)  Platelet Count - Automated: 141 K/uL (05-30 @ 06:17)  Platelet Count - Automated: 146 K/uL (05-29 @ 07:50)  Platelet Count - Automated: 135 K/uL (05-28 @ 05:45)      LIVER FUNCTIONS - ( 31 May 2025 06:50 )  Alb: 3.7 g/dL / Pro: 6.1 g/dL / ALK PHOS: 33 U/L / ALT: 18 U/L / AST: 4 U/L / GGT: x             Urinalysis Basic - ( 31 May 2025 06:50 )    Color: x / Appearance: x / SG: x / pH: x  Gluc: 130 mg/dL / Ketone: x  / Bili: x / Urobili: x   Blood: x / Protein: x / Nitrite: x   Leuk Esterase: x / RBC: x / WBC x   Sq Epi: x / Non Sq Epi: x / Bacteria: x        RADIOLOGY & ADDITIONAL STUDIES:      MICROBIOLOGY:  RECENT CULTURES:

## 2025-05-31 NOTE — CONSULT NOTE ADULT - ASSESSMENT
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    -Baseline Creatinine1.2  -KIMBERLY Likely 2/2 Diuretics   -Reviewed Urine lytes  -Reviewed UA  -S/P Dose of albumin  -No indication for dialysis  -Cardiology note reviewed  -Will give additional dose of albumin

## 2025-05-31 NOTE — PROGRESS NOTE ADULT - ASSESSMENT
REASON FOR VISIT  .. Management of problems listed below      EVENTS/CURRENT ISSUES.  .... 5/30/2025 abg shows metabolic alkalosis and resp acidosis noct bpap ordered   .... 5/29/2025 pred changed solumed   .... 5/28/2025 lasix changed to torsemide   .... 5/27/2025 being rxed for copd chf   .... 5/27/2025 83 y/o F with PMH AFon Eliquis, COPD (not on home o2), CKD, aplastic anemia, PMR (chronic prednisone with AVN hip aw sob wheeze         REVIEW OF SYMPTOMS   Able to give ROS  Yes     RELIABILITY +/-   CONSTITUTIONAL Weakness Yes    ENDOCRINE  No heat or cold intolerance    ALLERGY No hives  Sore throat No stridor  RESP Shortness of breath YES   NEURO New weakness No   CARDIAC   Palpitations No         PHYSICAL EXAM    HEENT Unremarkable  atraumatic   RESP Fair air entry  Harsh breath sound   CARDIAC S1 S2 No S3     NO JVD    ABDOMEN No hepatosplenomegaly   PEDAL EDEMA present No calf tenderness  NO rash     REASON FOR VISIT  .. Management of problems listed below      CC.   . 5/26/2025 c/o shortness of breath- was 87% on room air- patient was wheezing- poatient given 1 albuterol treatment by ems- on 4 litres patient saturating at 96%  shortness of breath  OVERALL PRESENTATION.  . 5/26/2025 83 y/o F with PMH AFon Eliquis, COPD (not on home o2), CKD, aplastic anemia, PMR (chronic prednisone with AVN hip), HLD, HTN, DM who p/w worsening SOB over the past two days, denies CP/ palpitations, has home O2 setup (but doesn't use) but put it on after noticing SpO2 in the 80's, which improved SOB. Endorses worsening SWAIN, denies significant wheezing, cough, recent illness med changes, or changes in diet. Adherent with home meds.    Denies fever, chills, chest pain, palpitations, cough, abdominal pain, nausea, vomiting, diarrhea, constipation, urinary frequency, urgency, or dysuria, headaches, changes in vision, dizziness, numbness, tingling.    ED Course:   Vitals: BP: 130/77, HR: 77, Temp: 98, RR: 26, SpO2: 96% on 4L NC  Labs: BNP 2185, trop WNL, WBC: 3.08 (around baseline), Hb 8.9 () (around baseline), HCO3- 32,  RVP negative,     CXR: linear interstitial prominence in b/l bases, trace pleural fluid, potential cardiogenic pulmonary edema, medi-port tip at cavoatrial junction, as per personal read, official read pending   B/L LE Dopplers: No evidence of DVT  EKG: NSR PACs  Received in the ED: lasix 20mg IV, solumedrol 125mg IV, duonebs  PMH.      PAST HOSPITAL STAYS .  Home Medications:   * No Current Medications as of 05-May-2025 09:54 documented in Structured Notes  · folic acid 1 mg oral tablet: 1 tab(s) orally once a day (in the morning)  · midodrine 5 mg oral tablet: 1 tablet orally 3 times a day  · torsemide 20 mg oral tablet: 1 tab(s) orally once a day (in the morning)  · gabapentin 400 mg oral capsule: 1 cap(s) orally 2 times a day  · tiZANidine: 2 tab(s) orally once a day (at bedtime)  · Singulair 10 mg oral tablet: 1 tab(s) orally once a day (in the morning)  · Bentyl 10 mg oral capsule: 1 cap(s) orally 2 times a day, As Needed  · Leflunomide: once a day (after a meal)  · NexIUM 20 mg oral delayed release capsule: 1 cap(s) orally once a day (in the morning)  · simvastatin 10 mg oral tablet: 1 tab(s) orally once a day (at bedtime)  · Reclast Infusion , IV x 1 yearly:   · Vitamin D3 , 1 tablet by mouth 3x a week:   · IVIG monthly:   · amiodarone 100 mg oral tablet: 1 tab(s) orally once a day (in the morning)  · oxyCODONE 5 mg oral tablet: 1 tab(s) orally prn  · Eliquis 2.5 mg oral tablet: 1 tab(s) orally 2 times a day  · Procrit 10,000   ER MGMT .      BEST PRACTICE ISSUES.  . HOB ELEVATN.    .... Yes  . DIET  .   .... DASH 2L FR 5/26  . FREE WATER.   ....   .  IV FLUID.  .....   . PHARMAC DVT PPLX .    .... APIXABA 5/26 a 2.5 x 2 (nv af)   . NON PHARMAC DVT PPLX .      . STRESS ULCR PPLX .   .... PROTONIX 40 5/27/2025  . DATE/DM MGMT.   ..... See under Endocrine section   GENERAL DATA .   . COVID.         .... scv2 5/27/2025 (-)   . GOC.    ....    . ICU STAY.    .... no   . INFECTION PPLX .   ....   . ALLGY.   ....  nka  . WT.   .... 5/27/2025 64  . BMI.  .... 5/27/2025 25       [id number]  VITALS/GAS EXCHANGE/DRIPS    ABGS.   . 5/27 declined ordered abg   . 5/30/2025 8a 3l 744/51/86   .  VS/ PO/IO/ VENT/ DRIPS.  5/31/2025 afeb 70 115/77   5/31/2025 2l 100%     XXXXXXXX[id number]  PROBLEM ASSESSMENT RECOMMENDATIONS.  RESP.   . GAS EXCHANGE .   .... target PO 90-95%   .... 5/28/2025 check abg to see if shes retaining co2     . HYPERCAPNIA   .... 5/30/2025 8a 3l 744/51/86   .... 5/30/2025 tco2 34   .... 5/30/2025 abg analysis suggests metabolic alkalosis plus resp acidosis   .... 5/30/2025 as pt co sob will try noct bpap 12/6/16/28     . SOB  .... DD COPD CHF     . RO VTE  .... venous duplx 5/26 (-)    . COPD  .... GIVEN PREDNISONE 50 5/27-5/29/2025 X 5D  .... SOLU   ........ 40.2 5/29/2025   ........ 40 5/31/2025   .... NACL 3% bid 5/29   .... DUONEB.4 P 5/27/2025  .... ALBUTEROL.4 5/27  .... ADVAIR 250 5/27   .... MONTELUKAST 10 5/30/2025   .... SPIRIVA 5/27    INFECTION.  . DATA  .... w 5/26-5/27-5/29-5/30 W 3 -  1.7 - 3.2- 2   .... pr 5/27/2025 pr .29   .... cxr 5/26 napd   .... ct ch 5/27/2025 cw 9/2/2023   ........ mild tracheobr secretns   ........ mosaic attenuation   ........ bl nodularity similar   ....... scattered linear and dependent atelectasis   ....... sm bl effsns   ....... mod indeterminate t8 veretebral body fr new   .... ct  5/31/2025   ......... incr small pl effs   ........ dilated pulm arteries suggestive of ph   .... rvp 5/26/2025 rvp (-)   .... no obvious active infectn      CARDIAC.  . ORTHOSTATIC HYPOTENSION  .... has v significant orthostatic hypotension   .... MIDODRINE 10.2 5/26    . CAD    .... Trop 5/27/2025 tr 15       . CHF  .... 5/27/2025  1 + p edema  .... pbnp 5/2705/30/2025 pbnp 2185- 2330   .... tte 4/2025 mild ph  n vf   .... lasix   ........ 5/26 l 40.2   ....... 5/28/2025 l dced   .... torsemide 5/28/2025 t 20   .... check tte     . A fib   .... APIXABA 5/26 a 2.5 x 2   .... AMIODARONE 5/27/2025 5/26 A 100    HEMAT.  .... Plt 5/27/2025 plt 170   ....  monitor    . IMMUNOSUPPRESSIVE  .... LEFLUNAMIDE 10 5/27/2025    . ANEMIA  .... Hb 5/27-5/28-5/9-5/30-5/31/2025   .... HB 10- 8.9- 8.9  - 8.4 - 8.2   .... fa 5/27/2025 fa > 20   .... b12 5/27/2025 b12 1444   .... target hb 7 (+)    . LEUKOPENIA   .... w 5/26-5/27-5/28 W 3 -  1.7 - 2.2    GI.   . DIET .   .... dash 2 liter fr 5/26    RENAL.  . DATA  .... Na 5/27/2025 Na 139   .... CO2 5/27/2025 co2 30   .... Cr 5/27-5/28-5/29-5/30-5/31/2025   .... Cr 1.2 - 1.3 - 1.3 - 1.6 - 1.4   .... monitor    . HYPERKALEMIA   .... K 5/27-5/28/2025 K 5.7 - 4.6     NEURO.  . PAIN  .... TIZANADINE 5/26 T 2 HS       [id number]   SUMMARY BASELINE .     . 83 y/o F with PMH AFon Eliquis, COPD (not on home o2), CKD, aplastic anemia, PMR (chronic prednisone with AVN hip), HLD, HTN, DM pt of Dr Gallegos not on home ox or home cpap used to use trelegy lives with spouse quit 40 y 1/2 ppd  CC.   . 5/26/2025 SHORTNESS OF BREATH   . 5/26/2026 HYPOXEMIA   . 5/26/2025 WHEEZE   MAIN ISSUES.  . HYPOXIA 5/26/2025  . RESP FAILURE   .... 5/30/2025 nict bpap prdrd   . SOB  .... DD COPD CHF   .... venous duplx 5/26 (-)  . COPD ex   . A fib   .... APIXABA 5/26 a 2.5 x 2  . CHF  .... pbnp 5/27/2025 pbnp 2185  . ANEMIA  .... Hb 5/27/2025 HB 10  . LEUKOPENIA   .... w 5/26-5/27 W 3 -  1.7     PMH.   . AFon Eliquis,   . COPD (not on home o2),   . CKD,   . aplastic anemia,   . PMR (chronic prednisone with AVN hip),   . HLD,   . HTN,   . DM    PROCEDURES/DEVICES .      DISCUSSIONS.  .... Discussed with primary care and relevant consultants on an ongoing basis       TIME SPENT.  . Over 36 minutes aggregate care time spent on encounter; activities included   direct patient care, counseling and/or coordinating care reviewing notes, lab data/ imaging , discussion with multidisciplinary team/ patient  /family and explaining in detail risks, benefits, alternatives  of the recommendations     ROBERT HENRIQUEZ 84 5/27/2025 1942

## 2025-05-31 NOTE — CONSULT NOTE ADULT - SUBJECTIVE AND OBJECTIVE BOX
[facility]                             Nephrology and Hypertension                             Sudhir  Dl Millan                                          [phone]     Patient is a 84y old  Female who presents with a chief complaint of Shortness of breath (31 May 2025 09:42)       HPI:  85 y/o F with PMH AFon Eliquis, COPD (not on home o2), CKD, aplastic anemia, PMR (chronic prednisone with AVN hip), HLD, HTN, DM who p/w worsening SOB over the past two days, denies CP/ palpitations, has home O2 setup (but doesn't use) but put it on after noticing SpO2 in the 80's, which improved SOB. Endorses worsening SWAIN, denies significant wheezing, cough, recent illness med changes, or changes in diet. Adherent with home meds.  States she has seen a nephrologist in the past, possibly from [facility], she does not remember who.  Denies any N/V.  SOB is stable.  She is urinating without issue.     PAST MEDICAL & SURGICAL HISTORY:  COPD (chronic obstructive pulmonary disease)      DM (diabetes mellitus)  diet-controlled      PAF (paroxysmal atrial fibrillation)  s/p ablation      Hiatal hernia      H/O aplastic anemia      History of IBS      H/O osteoporosis      HLD (hyperlipidemia)      PMR (polymyalgia rheumatica)      History of basal cell cancer      Chronic kidney disease (CKD)      Hypotension      Presence of IVC filter      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      History of immunodeficiency      Lumbar compression fracture      H/O hiatal hernia      H/O pulmonary fibrosis      H/O sinus bradycardia      History of fracture of right hip  "unoperable"      S/P hysterectomy      S/P foot surgery      S/P knee surgery      After cataract  bilateral eye cataract surgically removed      Closed right hip fracture  rigth hip ORIF july 19 2016      S/P IVC filter  nov 2016      S/P carpal tunnel release  Right 2008 & 6/27/17      H/O kyphoplasty      Port-A-Cath in place  right chest           FAMILY HISTORY:  Family history of bladder cancer (Mother)    Family history of myocardial infarction (Father)    FH: atrial fibrillation (Father)    NC    Social History:Non smoker    MEDICATIONS  (STANDING):  albuterol    0.083% 2.5 milliGRAM(s) Nebulizer every 6 hours  albuterol    90 MICROgram(s) HFA Inhaler 1 Puff(s) Inhalation every 4 hours  aMIOdarone    Tablet 100 milliGRAM(s) Oral daily  apixaban 2.5 milliGRAM(s) Oral every 12 hours  atorvastatin 10 milliGRAM(s) Oral at bedtime  fluticasone propionate/ salmeterol 250-50 MICROgram(s) Diskus 1 Dose(s) Inhalation two times a day  gabapentin 400 milliGRAM(s) Oral two times a day  leflunomide 10 milliGRAM(s) Oral daily  methylPREDNISolone sodium succinate Injectable 40 milliGRAM(s) IV Push every 12 hours  midodrine. 10 milliGRAM(s) Oral <User Schedule>  montelukast 10 milliGRAM(s) Oral daily  pantoprazole    Tablet 40 milliGRAM(s) Oral before breakfast  sodium chloride 3%  Inhalation 4 milliLiter(s) Inhalation every 12 hours  tiotropium 2.5 MICROgram(s) Inhaler 2 Puff(s) Inhalation daily  tiZANidine 2 milliGRAM(s) Oral at bedtime  torsemide 20 milliGRAM(s) Oral daily    MEDICATIONS  (PRN):  albuterol/ipratropium for Nebulization 3 milliLiter(s) Nebulizer every 6 hours PRN Shortness of Breath and/or Wheezing  dicyclomine 10 milliGRAM(s) Oral two times a day before meals PRN bowel irritability  melatonin 3 milliGRAM(s) Oral at bedtime PRN Insomnia   Meds reviewed    Allergies    No Known Allergies    Intolerances         REVIEW OF SYSTEMS:    Review of Systems:   Constitutional: Denies fatigue  HEENT: Denies headaches and dizziness  Respiratory: denies SOB, cough, or wheezing  Cardiovascular: denies CP, palpitations  Gastrointestinal: Denies nausea, denies vomiting, diarrhea, constipation, abdominal pain, or bloody stools  Genitourinary: denies painful urination, increased frequency, urgency, or bloody urine  Skin: denies rashes or itching  Musculoskeletal: denies muscle aches, joint swelling  Neurologic: Denies generalized weakness, denies loss of sensation, numbness, or tingling      Vital Signs Last 24 Hrs  T(C): 36.7 (31 May 2025 06:16), Max: 36.8 (30 May 2025 20:27)  T(F): 98 (31 May 2025 06:16), Max: 98.3 (30 May 2025 20:27)  HR: 75 (31 May 2025 07:51) (49 - 84)  BP: 112/65 (31 May 2025 06:16) (100/44 - 112/65)  BP(mean): --  RR: 18 (31 May 2025 06:16) (18 - 18)  SpO2: 98% (31 May 2025 07:51) (96% - 98%)    Parameters below as of 31 May 2025 07:51  Patient On (Oxygen Delivery Method): nasal cannula, 2 LPM      Daily     Daily     PHYSICAL EXAM:    GENERAL: NAD  HEAD:  Atraumatic, Normocephalic  EYES: EOMI, conjunctiva and sclera clear  ENMT: No Drainage from nares, No drainage from ears  NERVOUS SYSTEM:  Awake and Alert  CHEST/LUNG: Clear to percussion bilaterally  EXTREMITIES:  No Edema  SKIN: No rashes No obvious ecchymosis      LABS:                        8.2    2.90  )-----------( 156      ( 31 May 2025 06:50 )             26.0     05-31    139  |  98  |  59[H]  ----------------------------<  130[H]  4.6   |  35[H]  |  1.40[H]    Ca    9.9      31 May 2025 06:50  Phos  3.6     05-31  Mg     1.8     05-31    TPro  6.1  /  Alb  3.7  /  TBili  0.9  /  DBili  x   /  AST  4[L]  /  ALT  18  /  AlkPhos  33[L]  05-31      Urinalysis Basic - ( 31 May 2025 06:50 )    Color: x / Appearance: x / SG: x / pH: x  Gluc: 130 mg/dL / Ketone: x  / Bili: x / Urobili: x   Blood: x / Protein: x / Nitrite: x   Leuk Esterase: x / RBC: x / WBC x   Sq Epi: x / Non Sq Epi: x / Bacteria: x      Magnesium: 1.8 mg/dL (05-31 @ 06:50)  Phosphorus: 3.6 mg/dL (05-31 @ 06:50)    ABG - ( 30 May 2025 08:58 )  pH, Arterial: 7.44  pH, Blood: x     /  pCO2: 51    /  pO2: 86    / HCO3: 35    / Base Excess: 10.4  /  SaO2: 99.0                  RADIOLOGY & ADDITIONAL TESTS:

## 2025-05-31 NOTE — PROGRESS NOTE ADULT - ASSESSMENT
85 y/o F with PMH AFon Eliquis, COPD, CKD, aplastic anemia, PMR (chronic prednisone with AVN hip), HLD, HTN, DM who p/w worsening SOB over the past few days admitted for likely CHF exacerbation.        Problem/Plan - 1:  ·  Problem: Acute hypoxic respiratory failure.   ·  Plan: Probably secondary to acute on chronic COPD exacerbation aspect with acute on chronic diastolic heart failure exacerbation (since resolved). D-dimer negative, CXR without evidence of airspace consolidation, likely element of interstitial edema, BNP elevated  LE doppler negative for DVT  Recent TTE (4/8/2025): EF 63%, LV hypertrophy, intermediate diastolic dysfunction, mild pHTN. Discussed with Cardio- no need for repeat while inpatient    CT chest w/o IV contrast- mild B/L pleural effusions- also noted T8 vertebral body fx- patient aware of fracture- states occurred last year  Strict I/Os, daily weights  Fluid restriction 2L, avoid IVF   PT evaluation- recommend no skilled PT needs  Continuous pulse ox, wean O2 as tolerated to obtain SpO2 88-92% goal.  Patient diuresed well- switched from IV lasix to home torsemide 20mg daily. PRN IV Lasix   For COPD exacerbation continue duonebs q6h prn, switched  from PO prednisone to IV solumedrol for now. pulmonology (Dr. Baxter) consulted, appreciate recs.  d/w Cardio, recommended repeat CT chest non contrast since patient still sob      Problem/Plan - 2:  ·  Problem: Chronic obstructive pulmonary disease (COPD).   ·  Plan: No longer taking trelegy inhaler, stopped 6 mo ago by Dr. Gallegos (pulm)  wean O2 as tolerated to obtain SpO2 88-92% goal.  For potential COPD exacerbation continue duonebs q6h prn, IV solumedrol,  pulmonology (Dr. Baxter) consulted  Will also add nebusal to help thin secretions.     Problem/Plan - 3:  ·  Problem: Diabetes mellitus.   ·  Plan: Not on home medications  Consistent carb diet  Insulin sliding scale  Continue home gabapentin.     Problem/Plan - 4:  ·  Problem: Atrial fibrillation.   ·  Plan: Continue home eliquis 2.5 mg BID and amiodarone 100mg qd.     Problem/Plan - 5:  ·  Problem: Polymyalgia rheumatica.   ·  Plan: Takes prednisone 5mg daily, chronically, will increase to solumedrol for COPD exacerbation. Return back to home 5mg qd when increased dose no longer clinically warranted or completed course.     Continue home leflunomide (10mg), pharmacy only carries 20mg and cannot split tabs. Patient brought home medication- verified by pharmacy.     Problem/Plan - 6:  ·  Problem: Aplastic anemia.   ·  Plan: Chronic aplastic anemia, baseline Hb, gets frequent infusions and transfusions  No signs of bleeding  Heme/Onc consulted per patient request, appreciate recs.     Problem/Plan - 7:  ·  Problem: Hypotension.   ·  Plan: Continue home midodrine 10mg BID with hold parameters.     Problem/Plan - 8:  ·  Problem: HLD (hyperlipidemia).   ·  Plan: Continue home simvastatin 10mg qd as atorvastatin 10mg.     Problem/Plan - 9:  ·  Problem: Need for prophylactic measure.   ·  Plan: DVT: c/w home eliquis 2.5mg BID.

## 2025-06-01 LAB
ANION GAP SERPL CALC-SCNC: 6 MMOL/L — SIGNIFICANT CHANGE UP (ref 5–17)
BASOPHILS # BLD AUTO: 0 K/UL — SIGNIFICANT CHANGE UP (ref 0–0.2)
BASOPHILS NFR BLD AUTO: 0 % — SIGNIFICANT CHANGE UP (ref 0–2)
BUN SERPL-MCNC: 67 MG/DL — HIGH (ref 7–23)
CALCIUM SERPL-MCNC: 9.9 MG/DL — SIGNIFICANT CHANGE UP (ref 8.5–10.1)
CHLORIDE SERPL-SCNC: 96 MMOL/L — SIGNIFICANT CHANGE UP (ref 96–108)
CO2 SERPL-SCNC: 34 MMOL/L — HIGH (ref 22–31)
CREAT SERPL-MCNC: 1.6 MG/DL — HIGH (ref 0.5–1.3)
EGFR: 32 ML/MIN/1.73M2 — LOW
EGFR: 32 ML/MIN/1.73M2 — LOW
EOSINOPHIL # BLD AUTO: 0 K/UL — SIGNIFICANT CHANGE UP (ref 0–0.5)
EOSINOPHIL NFR BLD AUTO: 0 % — SIGNIFICANT CHANGE UP (ref 0–6)
GLUCOSE SERPL-MCNC: 127 MG/DL — HIGH (ref 70–99)
HCT VFR BLD CALC: 25.5 % — LOW (ref 34.5–45)
HGB BLD-MCNC: 8.2 G/DL — LOW (ref 11.5–15.5)
IMM GRANULOCYTES NFR BLD AUTO: 1 % — HIGH (ref 0–0.9)
LYMPHOCYTES # BLD AUTO: 0.31 K/UL — LOW (ref 1–3.3)
LYMPHOCYTES # BLD AUTO: 9.9 % — LOW (ref 13–44)
MCHC RBC-ENTMCNC: 31.7 PG — SIGNIFICANT CHANGE UP (ref 27–34)
MCHC RBC-ENTMCNC: 32.2 G/DL — SIGNIFICANT CHANGE UP (ref 32–36)
MCV RBC AUTO: 98.5 FL — SIGNIFICANT CHANGE UP (ref 80–100)
MONOCYTES # BLD AUTO: 0.63 K/UL — SIGNIFICANT CHANGE UP (ref 0–0.9)
MONOCYTES NFR BLD AUTO: 20.2 % — HIGH (ref 2–14)
NEUTROPHILS # BLD AUTO: 2.15 K/UL — SIGNIFICANT CHANGE UP (ref 1.8–7.4)
NEUTROPHILS NFR BLD AUTO: 68.9 % — SIGNIFICANT CHANGE UP (ref 43–77)
NRBC BLD AUTO-RTO: 1 /100 WBCS — HIGH (ref 0–0)
PLATELET # BLD AUTO: 155 K/UL — SIGNIFICANT CHANGE UP (ref 150–400)
POTASSIUM SERPL-MCNC: 5.1 MMOL/L — SIGNIFICANT CHANGE UP (ref 3.5–5.3)
POTASSIUM SERPL-SCNC: 5.1 MMOL/L — SIGNIFICANT CHANGE UP (ref 3.5–5.3)
RBC # BLD: 2.59 M/UL — LOW (ref 3.8–5.2)
RBC # FLD: 24 % — HIGH (ref 10.3–14.5)
SODIUM SERPL-SCNC: 136 MMOL/L — SIGNIFICANT CHANGE UP (ref 135–145)
WBC # BLD: 3.12 K/UL — LOW (ref 3.8–10.5)
WBC # FLD AUTO: 3.12 K/UL — LOW (ref 3.8–10.5)

## 2025-06-01 PROCEDURE — 99233 SBSQ HOSP IP/OBS HIGH 50: CPT

## 2025-06-01 RX ADMIN — AMIODARONE HYDROCHLORIDE 100 MILLIGRAM(S): 50 INJECTION, SOLUTION INTRAVENOUS at 06:05

## 2025-06-01 RX ADMIN — GABAPENTIN 400 MILLIGRAM(S): 400 CAPSULE ORAL at 18:17

## 2025-06-01 RX ADMIN — LEFLUNOMIDE 10 MILLIGRAM(S): 10 TABLET ORAL at 18:18

## 2025-06-01 RX ADMIN — Medication 4 MILLILITER(S): at 07:46

## 2025-06-01 RX ADMIN — MIDODRINE HYDROCHLORIDE 10 MILLIGRAM(S): 5 TABLET ORAL at 06:04

## 2025-06-01 RX ADMIN — Medication 2.5 MILLIGRAM(S): at 13:49

## 2025-06-01 RX ADMIN — GABAPENTIN 400 MILLIGRAM(S): 400 CAPSULE ORAL at 06:05

## 2025-06-01 RX ADMIN — Medication 4 MILLILITER(S): at 18:37

## 2025-06-01 RX ADMIN — TIZANIDINE 2 MILLIGRAM(S): 4 TABLET ORAL at 21:21

## 2025-06-01 RX ADMIN — APIXABAN 2.5 MILLIGRAM(S): 2.5 TABLET, FILM COATED ORAL at 06:05

## 2025-06-01 RX ADMIN — Medication 20 MILLIGRAM(S): at 06:04

## 2025-06-01 RX ADMIN — Medication 1 DOSE(S): at 21:21

## 2025-06-01 RX ADMIN — METHYLPREDNISOLONE ACETATE 40 MILLIGRAM(S): 80 INJECTION, SUSPENSION INTRA-ARTICULAR; INTRALESIONAL; INTRAMUSCULAR; SOFT TISSUE at 06:04

## 2025-06-01 RX ADMIN — TIOTROPIUM BROMIDE INHALATION SPRAY 2 PUFF(S): 3.12 SPRAY, METERED RESPIRATORY (INHALATION) at 06:40

## 2025-06-01 RX ADMIN — MIDODRINE HYDROCHLORIDE 10 MILLIGRAM(S): 5 TABLET ORAL at 18:17

## 2025-06-01 RX ADMIN — Medication 2.5 MILLIGRAM(S): at 18:37

## 2025-06-01 RX ADMIN — APIXABAN 2.5 MILLIGRAM(S): 2.5 TABLET, FILM COATED ORAL at 18:17

## 2025-06-01 RX ADMIN — ATORVASTATIN CALCIUM 10 MILLIGRAM(S): 80 TABLET, FILM COATED ORAL at 21:21

## 2025-06-01 RX ADMIN — Medication 1 DOSE(S): at 06:40

## 2025-06-01 RX ADMIN — Medication 40 MILLIGRAM(S): at 06:05

## 2025-06-01 RX ADMIN — MONTELUKAST SODIUM 10 MILLIGRAM(S): 10 TABLET ORAL at 11:46

## 2025-06-01 RX ADMIN — Medication 2.5 MILLIGRAM(S): at 07:42

## 2025-06-01 NOTE — PROGRESS NOTE ADULT - ASSESSMENT
85 y/o F with PMH AFon Eliquis, COPD, CKD, aplastic anemia, PMR (chronic prednisone with AVN hip), HLD, HTN, DM who p/w worsening SOB over the past few days admitted for likely CHF exacerbation.        Problem/Plan - 1:  ·  Problem: Acute hypoxic respiratory failure.   ·  Plan: Probably secondary to acute on chronic COPD exacerbation aspect with acute on chronic diastolic heart failure exacerbation (since resolved). D-dimer negative, CXR without evidence of airspace consolidation, likely element of interstitial edema, BNP elevated  LE doppler negative for DVT  Recent TTE (4/8/2025): EF 63%, LV hypertrophy, intermediate diastolic dysfunction, mild pHTN. Per  Cardio- no need for repeat while inpatient    CT chest w/o IV contrast- mild B/L pleural effusions- also noted T8 vertebral body fx- patient aware of fracture- states occurred last year.  Strict I/Os, daily weights  Fluid restriction 2L, avoid IVF   PT evaluation- recommend no skilled PT needs  Continuous pulse ox, wean O2 as tolerated to obtain SpO2 88-92% goal.  Patient diuresed well- switched from IV lasix to home torsemide 20mg daily. PRN IV Lasix   For COPD exacerbation continue duonebs q6h prn, switched  from PO prednisone to IV solumedrol for now. pulmonology (Dr. Baxter) consulted, appreciate recs.  d/w Cardio, recommended repeat CT chest non contrast since patient still sob, reviewed the result. + for bilat pleural effusions and Pulm hTN.      Problem/Plan - 2:  ·  Problem: Chronic obstructive pulmonary disease (COPD).   ·  Plan: No longer taking trelegy inhaler, stopped 6 mo ago by Dr. Gallegos (pulm)  wean O2 as tolerated to obtain SpO2 88-92% goal.  For potential COPD exacerbation continue duonebs q6h prn, IV solumedrol,  pulmonology (Dr. Baxter) consulted  Will also add nebusal to help thin secretions.     Problem/Plan - 3:  ·  Problem: Diabetes mellitus.   ·  Plan: Not on home medications  Consistent carb diet  Insulin sliding scale  Continue home gabapentin.     Problem/Plan - 4:  ·  Problem: Atrial fibrillation.   ·  Plan: Continue home eliquis 2.5 mg BID and amiodarone 100mg qd.     Problem/Plan - 5:  ·  Problem: Polymyalgia rheumatica.   ·  Plan: Takes prednisone 5mg daily, chronically, will increase to solumedrol for COPD exacerbation. Return back to home 5mg qd when increased dose no longer clinically warranted or completed course.     Continue home leflunomide (10mg), pharmacy only carries 20mg and cannot split tabs. Patient brought home medication- verified by pharmacy.     Problem/Plan - 6:  ·  Problem: Aplastic anemia.   ·  Plan: Chronic aplastic anemia, baseline Hb, gets frequent infusions and transfusions  No signs of bleeding  Heme/Onc consulted per patient request, appreciate recs.     Problem/Plan - 7:  ·  Problem: Hypotension.   ·  Plan: Continue home midodrine 10mg BID with hold parameters.     Problem/Plan - 8:  ·  Problem: HLD (hyperlipidemia).   ·  Plan: Continue home simvastatin 10mg qd as atorvastatin 10mg.     Problem/Plan - 9:  ·  Problem: Need for prophylactic measure.   ·  Plan: DVT: c/w home eliquis 2.5mg BID. 83 y/o F with PMH AFon Eliquis, COPD, CKD, aplastic anemia, PMR (chronic prednisone with AVN hip), HLD, HTN, DM who p/w worsening SOB over the past few days admitted for likely CHF exacerbation.        Problem/Plan - 1:  ·  Problem: Acute hypoxic respiratory failure.   ·  Plan: Probably secondary to acute on chronic COPD exacerbation aspect with acute on chronic diastolic heart failure exacerbation (since resolved). D-dimer negative, CXR without evidence of airspace consolidation, likely element of interstitial edema, BNP elevated  LE doppler negative for DVT  Recent TTE (4/8/2025): EF 63%, LV hypertrophy, intermediate diastolic dysfunction, mild pHTN. Repeat Echo Pending     CT chest w/o IV contrast- mild B/L pleural effusions- also noted T8 vertebral body fx- patient aware of fracture- states occurred last year.  Strict I/Os, daily weights  Fluid restriction 2L, avoid IVF   PT evaluation- recommend no skilled PT needs  Continuous pulse ox, wean O2 as tolerated to obtain SpO2 88-92% goal.  Patient diuresed well- switched from IV lasix to home torsemide 20mg daily. PRN IV Lasix   For COPD exacerbation continue duonebs q6h prn, switched  from PO prednisone to IV solumedrol for now. pulmonology (Dr. Baxter) consulted, appreciate recs.  d/w Cardio, recommended repeat CT chest non contrast since patient still sob, reviewed the result. + for bilat pleural effusions and Pulm hTN.      Problem/Plan - 2:  ·  Problem: Chronic obstructive pulmonary disease (COPD).   ·  Plan: No longer taking trelegy inhaler, stopped 6 mo ago by Dr. Gallegos (pulm)  wean O2 as tolerated to obtain SpO2 88-92% goal.  For potential COPD exacerbation continue duonebs q6h prn, IV solumedrol,  pulmonology (Dr. Baxter) consulted  Will also add nebusal to help thin secretions.     Problem/Plan - 3:  ·  Problem: Diabetes mellitus.   ·  Plan: Not on home medications  Consistent carb diet  Insulin sliding scale  Continue home gabapentin.     Problem/Plan - 4:  ·  Problem: Atrial fibrillation.   ·  Plan: Continue home eliquis 2.5 mg BID and amiodarone 100mg qd.     Problem/Plan - 5:  ·  Problem: Polymyalgia rheumatica.   ·  Plan: Takes prednisone 5mg daily, chronically, will increase to solumedrol for COPD exacerbation. Return back to home 5mg qd when increased dose no longer clinically warranted or completed course.     Continue home leflunomide (10mg), pharmacy only carries 20mg and cannot split tabs. Patient brought home medication- verified by pharmacy.     Problem/Plan - 6:  ·  Problem: Aplastic anemia.   ·  Plan: Chronic aplastic anemia, baseline Hb, gets frequent infusions and transfusions  No signs of bleeding  Heme/Onc consulted per patient request, appreciate recs.     Problem/Plan - 7:  ·  Problem: Hypotension.   ·  Plan: Continue home midodrine 10mg BID with hold parameters.     Problem/Plan - 8:  ·  Problem: HLD (hyperlipidemia).   ·  Plan: Continue home simvastatin 10mg qd as atorvastatin 10mg.     Problem/Plan - 9:  ·  Problem: Need for prophylactic measure.   ·  Plan: DVT: c/w home eliquis 2.5mg BID.

## 2025-06-01 NOTE — PROGRESS NOTE ADULT - SUBJECTIVE AND OBJECTIVE BOX
CHIEF COMPLAINT/ REASON FOR VISIT  .. Patient was seen to address the  issue listed under PROBLEM LIST which is located toward bottom of this note     MARTINEZ PATEL 2EAS 201 D1    Allergies    No Known Allergies    Intolerances        PAST MEDICAL & SURGICAL HISTORY:  COPD (chronic obstructive pulmonary disease)      DM (diabetes mellitus)  diet-controlled      PAF (paroxysmal atrial fibrillation)  s/p ablation      Hiatal hernia      H/O aplastic anemia      History of IBS      H/O osteoporosis      HLD (hyperlipidemia)      PMR (polymyalgia rheumatica)      History of basal cell cancer      Chronic kidney disease (CKD)      Hypotension      Presence of IVC filter      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      History of immunodeficiency      Lumbar compression fracture      H/O hiatal hernia      H/O pulmonary fibrosis      H/O sinus bradycardia      History of fracture of right hip  "unoperable"      S/P hysterectomy      S/P foot surgery      S/P knee surgery      After cataract  bilateral eye cataract surgically removed      Closed right hip fracture  rigth hip ORIF july 19 2016      S/P IVC filter  nov 2016      S/P carpal tunnel release  Right 2008 & 6/27/17      H/O kyphoplasty      Port-A-Cath in place  right chest          FAMILY HISTORY:  Family history of bladder cancer (Mother)    Family history of myocardial infarction (Father)    FH: atrial fibrillation (Father)        Home Medications:  amiodarone 200 mg oral tablet: 0.5 tab(s) orally once a day (27 May 2025 13:21)  Bentyl 10 mg oral capsule: 1 cap(s) orally 2 times a day, As Needed (27 May 2025 13:22)  Eliquis 2.5 mg oral tablet: 1 tab(s) orally 2 times a day (27 May 2025 12:39)  esomeprazole 20 mg oral delayed release capsule: 1 cap(s) orally once a day (27 May 2025 13:23)  folic acid 1 mg oral tablet: 1 tab(s) orally once a day (in the morning) (27 May 2025 12:39)  gabapentin 400 mg oral capsule: 1 cap(s) orally 2 times a day (27 May 2025 12:39)  IVIG monthly:  (27 May 2025 12:39)  leflunomide 10 mg oral tablet: 1 tab(s) orally once a day (27 May 2025 13:23)  midodrine 5 mg oral tablet: 2 tab(s) orally in the morning and 1 tab orally in the evening (27 May 2025 13:23)  montelukast 10 mg oral tablet: 1 tab(s) orally once a day (27 May 2025 13:23)  predniSONE 5 mg oral tablet: 1 tab(s) orally once a day (27 May 2025 13:22)  Procrit 10,000 units/mL preservative-free injectable solution: injectable once a week WEDNESDAY (27 May 2025 12:39)  Reclast Infusion , IV x 1 yearly:  (27 May 2025 13:22)  simvastatin 10 mg oral tablet: 1 tab(s) orally once a day (at bedtime) (27 May 2025 12:39)  tiZANidine: 2 tab(s) orally once a day (at bedtime) as needed for  muscle spasm (27 May 2025 13:22)  torsemide 20 mg oral tablet: 1 tab(s) orally once a day (in the morning) (27 May 2025 12:39)      MEDICATIONS  (STANDING):  albuterol    0.083% 2.5 milliGRAM(s) Nebulizer every 6 hours  albuterol    90 MICROgram(s) HFA Inhaler 1 Puff(s) Inhalation every 4 hours  aMIOdarone    Tablet 100 milliGRAM(s) Oral daily  apixaban 2.5 milliGRAM(s) Oral every 12 hours  atorvastatin 10 milliGRAM(s) Oral at bedtime  fluticasone propionate/ salmeterol 250-50 MICROgram(s) Diskus 1 Dose(s) Inhalation two times a day  gabapentin 400 milliGRAM(s) Oral two times a day  leflunomide 10 milliGRAM(s) Oral daily  methylPREDNISolone sodium succinate Injectable 40 milliGRAM(s) IV Push daily  midodrine. 10 milliGRAM(s) Oral <User Schedule>  montelukast 10 milliGRAM(s) Oral daily  pantoprazole    Tablet 40 milliGRAM(s) Oral before breakfast  sodium chloride 3%  Inhalation 4 milliLiter(s) Inhalation every 12 hours  tiotropium 2.5 MICROgram(s) Inhaler 2 Puff(s) Inhalation daily  tiZANidine 2 milliGRAM(s) Oral at bedtime  torsemide 20 milliGRAM(s) Oral daily    MEDICATIONS  (PRN):  albuterol/ipratropium for Nebulization 3 milliLiter(s) Nebulizer every 6 hours PRN Shortness of Breath and/or Wheezing  dicyclomine 10 milliGRAM(s) Oral two times a day before meals PRN bowel irritability  melatonin 3 milliGRAM(s) Oral at bedtime PRN Insomnia      Diet, Regular:   DASH/TLC Sodium & Cholesterol Restricted  2000mL Fluid Restriction ([id number]) (05-26-25 @ 23:27) [Active]          Vital Signs Last 24 Hrs  T(C): 36.8 (01 Jun 2025 05:29), Max: 36.8 (31 May 2025 11:33)  T(F): 98.2 (01 Jun 2025 05:29), Max: 98.3 (31 May 2025 11:33)  HR: 57 (01 Jun 2025 07:42) (55 - 72)  BP: 101/61 (01 Jun 2025 05:29) (101/61 - 115/77)  BP(mean): --  RR: 18 (01 Jun 2025 05:29) (18 - 18)  SpO2: 99% (01 Jun 2025 07:42) (96% - 100%)    Parameters below as of 01 Jun 2025 07:42  Patient On (Oxygen Delivery Method): nasal cannula, 2 lpm          05-31-25 @ 07:01  -  06-01-25 @ 07:00  --------------------------------------------------------  IN: 0 mL / OUT: 750 mL / NET: -750 mL              LABS:                        8.2    2.90  )-----------( 156      ( 31 May 2025 06:50 )             26.0     05-31    139  |  98  |  59[H]  ----------------------------<  130[H]  4.6   |  35[H]  |  1.40[H]    Ca    9.9      31 May 2025 06:50  Phos  3.6     05-31  Mg     1.8     05-31    TPro  6.1  /  Alb  3.7  /  TBili  0.9  /  DBili  x   /  AST  4[L]  /  ALT  18  /  AlkPhos  33[L]  05-31      Urinalysis Basic - ( 31 May 2025 06:50 )    Color: x / Appearance: x / SG: x / pH: x  Gluc: 130 mg/dL / Ketone: x  / Bili: x / Urobili: x   Blood: x / Protein: x / Nitrite: x   Leuk Esterase: x / RBC: x / WBC x   Sq Epi: x / Non Sq Epi: x / Bacteria: x        ABG - ( 30 May 2025 08:58 )  pH, Arterial: 7.44  pH, Blood: x     /  pCO2: 51    /  pO2: 86    / HCO3: 35    / Base Excess: 10.4  /  SaO2: 99.0                WBC:  WBC Count: 2.90 K/uL (05-31 @ 06:50)  WBC Count: 2.08 K/uL (05-30 @ 06:17)  WBC Count: 3.21 K/uL (05-29 @ 07:50)      MICROBIOLOGY:  RECENT CULTURES:                  Sodium:  Sodium: 139 mmol/L (05-31 @ 06:50)  Sodium: 140 mmol/L (05-30 @ 06:17)  Sodium: 138 mmol/L (05-29 @ 07:50)      1.40 mg/dL 05-31 @ 06:50  1.60 mg/dL 05-30 @ 06:17  1.30 mg/dL 05-29 @ 07:50      Hemoglobin:  Hemoglobin: 8.2 g/dL (05-31 @ 06:50)  Hemoglobin: 8.4 g/dL (05-30 @ 06:17)  Hemoglobin: 8.9 g/dL (05-29 @ 07:50)      Platelets: Platelet Count - Automated: 156 K/uL (05-31 @ 06:50)  Platelet Count - Automated: 141 K/uL (05-30 @ 06:17)  Platelet Count - Automated: 146 K/uL (05-29 @ 07:50)      LIVER FUNCTIONS - ( 31 May 2025 06:50 )  Alb: 3.7 g/dL / Pro: 6.1 g/dL / ALK PHOS: 33 U/L / ALT: 18 U/L / AST: 4 U/L / GGT: x             Urinalysis Basic - ( 31 May 2025 06:50 )    Color: x / Appearance: x / SG: x / pH: x  Gluc: 130 mg/dL / Ketone: x  / Bili: x / Urobili: x   Blood: x / Protein: x / Nitrite: x   Leuk Esterase: x / RBC: x / WBC x   Sq Epi: x / Non Sq Epi: x / Bacteria: x        RADIOLOGY & ADDITIONAL STUDIES:      MICROBIOLOGY:  RECENT CULTURES:

## 2025-06-01 NOTE — PROGRESS NOTE ADULT - SUBJECTIVE AND OBJECTIVE BOX
[facility]    Sai Valle, Manuel, Girish, El, David      [phone]    CHIEF COMPLAINT: Patient is a 84y old  Female who presents with a chief complaint of Shortness of breath (01 Jun 2025 10:35)      Follow Up: sob    Interim history: The patient reports no new symptoms.  Denies chest discomfort. Improved shortness of breath relative to yesterday.  No abdominal pain.  No new neurologic symptoms.      MEDICATIONS  (STANDING):  albuterol    0.083% 2.5 milliGRAM(s) Nebulizer every 6 hours  albuterol    90 MICROgram(s) HFA Inhaler 1 Puff(s) Inhalation every 4 hours  aMIOdarone    Tablet 100 milliGRAM(s) Oral daily  apixaban 2.5 milliGRAM(s) Oral every 12 hours  atorvastatin 10 milliGRAM(s) Oral at bedtime  fluticasone propionate/ salmeterol 250-50 MICROgram(s) Diskus 1 Dose(s) Inhalation two times a day  gabapentin 400 milliGRAM(s) Oral two times a day  leflunomide 10 milliGRAM(s) Oral daily  methylPREDNISolone sodium succinate Injectable 40 milliGRAM(s) IV Push daily  midodrine. 10 milliGRAM(s) Oral <User Schedule>  montelukast 10 milliGRAM(s) Oral daily  pantoprazole    Tablet 40 milliGRAM(s) Oral before breakfast  sodium chloride 3%  Inhalation 4 milliLiter(s) Inhalation every 12 hours  tiotropium 2.5 MICROgram(s) Inhaler 2 Puff(s) Inhalation daily  tiZANidine 2 milliGRAM(s) Oral at bedtime  torsemide 20 milliGRAM(s) Oral daily    MEDICATIONS  (PRN):  albuterol/ipratropium for Nebulization 3 milliLiter(s) Nebulizer every 6 hours PRN Shortness of Breath and/or Wheezing  dicyclomine 10 milliGRAM(s) Oral two times a day before meals PRN bowel irritability  melatonin 3 milliGRAM(s) Oral at bedtime PRN Insomnia      REVIEW OF SYSTEMS:  eye, ent, GI, , allergic, dermatologic, musculoskeletal and neurologic are negative except as described above    Vital Signs Last 24 Hrs  T(C): 36.8 (01 Jun 2025 05:29), Max: 36.8 (31 May 2025 11:33)  T(F): 98.2 (01 Jun 2025 05:29), Max: 98.3 (31 May 2025 11:33)  HR: 57 (01 Jun 2025 07:42) (55 - 72)  BP: 101/61 (01 Jun 2025 05:29) (101/61 - 115/77)  BP(mean): --  RR: 18 (01 Jun 2025 05:29) (18 - 18)  SpO2: 99% (01 Jun 2025 07:42) (96% - 100%)    Parameters below as of 01 Jun 2025 07:42  Patient On (Oxygen Delivery Method): nasal cannula, 2 lpm        I&O's Summary    31 May 2025 07:01  -  01 Jun 2025 07:00  --------------------------------------------------------  IN: 0 mL / OUT: 750 mL / NET: -750 mL        Telemetry past 24h:  sr pac brief pat    PHYSICAL EXAM:    Constitutional: well-nourished, well-developed, NAD   HEENT:  MMM, sclerae anicteric, conjunctivae clear, no oral cyanosis. right eye ecchymosis  Pulmonary: Non-labored, breath sounds are decr at bases bilaterally, No wheezing, rales or rhonchi  Cardiovascular: Regular, S1 and S2.  No murmur.  No rubs, gallops or clicks  Gastrointestinal: Bowel Sounds present, soft, nontender.   Lymph: No peripheral edema.   Neurological: Alert, no focal deficits  Skin: No rashes.  Psych:  Mood & affect appropriate    LABS: All Labs Reviewed:                        8.2    3.12  )-----------( 155      ( 01 Jun 2025 07:10 )             25.5                         8.2    2.90  )-----------( 156      ( 31 May 2025 06:50 )             26.0                         8.4    2.08  )-----------( 141      ( 30 May 2025 06:17 )             26.1     01 Jun 2025 07:10    136    |  96     |  67     ----------------------------<  127    5.1     |  34     |  1.60   31 May 2025 06:50    139    |  98     |  59     ----------------------------<  130    4.6     |  35     |  1.40   30 May 2025 06:17    140    |  99     |  53     ----------------------------<  129    5.2     |  34     |  1.60     Ca    9.9        01 Jun 2025 07:10  Ca    9.9        31 May 2025 06:50  Ca    9.5        30 May 2025 06:17  Phos  3.6       31 May 2025 06:50  Mg     1.8       31 May 2025 06:50    TPro  6.1    /  Alb  3.7    /  TBili  0.9    /  DBili  x      /  AST  4      /  ALT  18     /  AlkPhos  33     31 May 2025 06:50          Blood Culture:        RADIOLOGY:    EKG:    Echo:

## 2025-06-01 NOTE — PROGRESS NOTE ADULT - SUBJECTIVE AND OBJECTIVE BOX
Patient is a 84y old  Female who presents with a chief complaint of Shortness of breath (01 Jun 2025 08:05)      INTERVAL HPI/OVERNIGHT EVENTS: Patient seen and examined at bedside. Still feels very sob upon ambulation, ok at rest. On O2 via NC. No chest pain, palpitations, abdominal pain, n/v/d/c      MEDICATIONS  (STANDING):  albuterol    0.083% 2.5 milliGRAM(s) Nebulizer every 6 hours  albuterol    90 MICROgram(s) HFA Inhaler 1 Puff(s) Inhalation every 4 hours  aMIOdarone    Tablet 100 milliGRAM(s) Oral daily  apixaban 2.5 milliGRAM(s) Oral every 12 hours  atorvastatin 10 milliGRAM(s) Oral at bedtime  fluticasone propionate/ salmeterol 250-50 MICROgram(s) Diskus 1 Dose(s) Inhalation two times a day  gabapentin 400 milliGRAM(s) Oral two times a day  leflunomide 10 milliGRAM(s) Oral daily  methylPREDNISolone sodium succinate Injectable 40 milliGRAM(s) IV Push daily  midodrine. 10 milliGRAM(s) Oral <User Schedule>  montelukast 10 milliGRAM(s) Oral daily  pantoprazole    Tablet 40 milliGRAM(s) Oral before breakfast  sodium chloride 3%  Inhalation 4 milliLiter(s) Inhalation every 12 hours  tiotropium 2.5 MICROgram(s) Inhaler 2 Puff(s) Inhalation daily  tiZANidine 2 milliGRAM(s) Oral at bedtime  torsemide 20 milliGRAM(s) Oral daily    MEDICATIONS  (PRN):  albuterol/ipratropium for Nebulization 3 milliLiter(s) Nebulizer every 6 hours PRN Shortness of Breath and/or Wheezing  dicyclomine 10 milliGRAM(s) Oral two times a day before meals PRN bowel irritability  melatonin 3 milliGRAM(s) Oral at bedtime PRN Insomnia      Allergies    No Known Allergies    Intolerances        REVIEW OF SYSTEMS:  CONSTITUTIONAL: No fever or fatigue  EYES: No eye pain, visual disturbances, or discharge  ENMT:  No difficulty hearing, tinnitus, vertigo; No sinus or throat pain  NECK: No pain or stiffness  RESPIRATORY: No cough, wheezing, chills or hemoptysis; + shortness of breath  CARDIOVASCULAR: No chest pain, palpitations, dizziness, or leg swelling  GASTROINTESTINAL: No abdominal or epigastric pain. No nausea, vomiting, or hematemesis; No diarrhea or constipation.   GENITOURINARY: No dysuria, frequency, hematuria, or incontinence  NEUROLOGICAL: No headaches, memory loss, loss of strength, numbness, or tremors  SKIN: No itching, burning, rashes, or lesions   Vital Signs Last 24 Hrs  T(C): 36.8 (01 Jun 2025 05:29), Max: 36.8 (31 May 2025 11:33)  T(F): 98.2 (01 Jun 2025 05:29), Max: 98.3 (31 May 2025 11:33)  HR: 57 (01 Jun 2025 07:42) (55 - 72)  BP: 101/61 (01 Jun 2025 05:29) (101/61 - 115/77)  BP(mean): --  RR: 18 (01 Jun 2025 05:29) (18 - 18)  SpO2: 99% (01 Jun 2025 07:42) (96% - 100%)    Parameters below as of 01 Jun 2025 07:42  Patient On (Oxygen Delivery Method): nasal cannula        PHYSICAL EXAM:  GENERAL: NAD, Awake, alert, able to speak in full sentences   HEAD:  Atraumatic, Normocephalic  EYES: EOMI, PERRLA, conjunctiva and sclera clear  ENMT: No tonsillar erythema, exudates, or enlargement; Moist mucous membranes  NECK: Supple, No JVD, Normal thyroid  NERVOUS SYSTEM:  Alert & Oriented X3, No focal motor/sensory deficits noted   CHEST/LUNG: Decreased bibasilar breath sounds no wheezing   HEART: Regular rate and rhythm; No murmurs, rubs, or gallops  ABDOMEN: Soft, Nontender, Nondistended; Bowel sounds present  EXTREMITIES:  2+ Peripheral Pulses, No clubbing, cyanosis  LYMPH: No lymphadenopathy noted  SKIN: No rashes or lesions    LABS:      Ca    9.9        31 May 2025 06:50        CAPILLARY BLOOD GLUCOSE        BLOOD CULTURE    RADIOLOGY & ADDITIONAL TESTS:    Imaging Personally Reviewed:  [ ] YES     Consultant(s) Notes Reviewed:      Care Discussed with Consultants/Other Providers:

## 2025-06-01 NOTE — PROGRESS NOTE ADULT - ASSESSMENT
REASON FOR VISIT  .. Management of problems listed below      EVENTS/CURRENT ISSUES.  .... 5/30/2025 abg shows metabolic alkalosis and resp acidosis noct bpap ordered   .... 5/29/2025 pred changed solumed   .... 5/28/2025 lasix changed to torsemide   .... 5/27/2025 being rxed for copd chf   .... 5/27/2025 85 y/o F with PMH AFon Eliquis, COPD (not on home o2), CKD, aplastic anemia, PMR (chronic prednisone with AVN hip aw sob wheeze         REVIEW OF SYMPTOMS   Able to give ROS  Yes     RELIABILITY +/-   CONSTITUTIONAL Weakness Yes    ENDOCRINE  No heat or cold intolerance    ALLERGY No hives  Sore throat No stridor  RESP Shortness of breath YES   NEURO New weakness No   CARDIAC   Palpitations No         PHYSICAL EXAM    HEENT Unremarkable  atraumatic   RESP Fair air entry  Harsh breath sound   CARDIAC S1 S2 No S3     NO JVD    ABDOMEN No hepatosplenomegaly   PEDAL EDEMA present No calf tenderness  NO rash     REASON FOR VISIT  .. Management of problems listed below      CC.   . 5/26/2025 c/o shortness of breath- was 87% on room air- patient was wheezing- poatient given 1 albuterol treatment by ems- on 4 litres patient saturating at 96%  shortness of breath  OVERALL PRESENTATION.  . 5/26/2025 85 y/o F with PMH AFon Eliquis, COPD (not on home o2), CKD, aplastic anemia, PMR (chronic prednisone with AVN hip), HLD, HTN, DM who p/w worsening SOB over the past two days, denies CP/ palpitations, has home O2 setup (but doesn't use) but put it on after noticing SpO2 in the 80's, which improved SOB. Endorses worsening SWAIN, denies significant wheezing, cough, recent illness med changes, or changes in diet. Adherent with home meds.    Denies fever, chills, chest pain, palpitations, cough, abdominal pain, nausea, vomiting, diarrhea, constipation, urinary frequency, urgency, or dysuria, headaches, changes in vision, dizziness, numbness, tingling.    ED Course:   Vitals: BP: 130/77, HR: 77, Temp: 98, RR: 26, SpO2: 96% on 4L NC  Labs: BNP 2185, trop WNL, WBC: 3.08 (around baseline), Hb 8.9 () (around baseline), HCO3- 32,  RVP negative,     CXR: linear interstitial prominence in b/l bases, trace pleural fluid, potential cardiogenic pulmonary edema, medi-port tip at cavoatrial junction, as per personal read, official read pending   B/L LE Dopplers: No evidence of DVT  EKG: NSR PACs  Received in the ED: lasix 20mg IV, solumedrol 125mg IV, duonebs  PMH.      PAST HOSPITAL STAYS .  Home Medications:   * No Current Medications as of 05-May-2025 09:54 documented in Structured Notes  · folic acid 1 mg oral tablet: 1 tab(s) orally once a day (in the morning)  · midodrine 5 mg oral tablet: 1 tablet orally 3 times a day  · torsemide 20 mg oral tablet: 1 tab(s) orally once a day (in the morning)  · gabapentin 400 mg oral capsule: 1 cap(s) orally 2 times a day  · tiZANidine: 2 tab(s) orally once a day (at bedtime)  · Singulair 10 mg oral tablet: 1 tab(s) orally once a day (in the morning)  · Bentyl 10 mg oral capsule: 1 cap(s) orally 2 times a day, As Needed  · Leflunomide: once a day (after a meal)  · NexIUM 20 mg oral delayed release capsule: 1 cap(s) orally once a day (in the morning)  · simvastatin 10 mg oral tablet: 1 tab(s) orally once a day (at bedtime)  · Reclast Infusion , IV x 1 yearly:   · Vitamin D3 , 1 tablet by mouth 3x a week:   · IVIG monthly:   · amiodarone 100 mg oral tablet: 1 tab(s) orally once a day (in the morning)  · oxyCODONE 5 mg oral tablet: 1 tab(s) orally prn  · Eliquis 2.5 mg oral tablet: 1 tab(s) orally 2 times a day  · Procrit 10,000   ER MGMT .      BEST PRACTICE ISSUES.  . HOB ELEVATN.    .... Yes  . DIET  .   .... DASH 2L FR 5/26  . FREE WATER.   ....   .  IV FLUID.  .....   . PHARMAC DVT PPLX .    .... APIXABA 5/26 a 2.5 x 2 (nv af)   . NON PHARMAC DVT PPLX .      . STRESS ULCR PPLX .   .... PROTONIX 40 5/27/2025  . DATE/DM MGMT.   ..... See under Endocrine section   GENERAL DATA .   . COVID.         .... scv2 5/27/2025 (-)   . GOC.    ....    . ICU STAY.    .... no   . INFECTION PPLX .   ....   . ALLGY.   ....  nka  . WT.   .... 5/27/2025 64  . BMI.  .... 5/27/2025 25       [id number]  VITALS/GAS EXCHANGE/DRIPS    ABGS.   . 5/27 declined ordered abg   . 5/30/2025 8a 3l 744/51/86   .  VS/ PO/IO/ VENT/ DRIPS.  6/1/2025 afeb 77 100/60   6/1/2025 2l 97%    XXXXXXXX[id number]  PROBLEM ASSESSMENT RECOMMENDATIONS.  RESP.   . GAS EXCHANGE .   .... target PO 90-95%   .... 5/28/2025 check abg to see if shes retaining co2     . HYPERCAPNIA   .... 5/30/2025 8a 3l 744/51/86   .... 5/30/2025 tco2 34   .... 5/30/2025 abg analysis suggests metabolic alkalosis plus resp acidosis   .... 5/30/2025 as pt co sob will try noct bpap 12/6/16/28     . SOB  .... DD COPD CHF     . RO VTE  .... venous duplx 5/26 (-)    . COPD  .... GIVEN PREDNISONE 50 5/27-5/29/2025 X 5D  .... SOLU   ........ 40.2 5/29/2025   ........ 40 5/31/2025   .... NACL 3% bid 5/29   .... DUONEB.4 P 5/27/2025  .... ALBUTEROL.4 5/27  .... ADVAIR 250 5/27   .... MONTELUKAST 10 5/30/2025   .... SPIRIVA 5/27    INFECTION.  . DATA  .... w 5/26-5/27-5/29-5/30 W 3 -  1.7 - 3.2- 2   .... pr 5/27/2025 pr .29   .... cxr 5/26 napd   .... ct ch 5/27/2025 cw 9/2/2023   ........ mild tracheobr secretns   ........ mosaic attenuation   ........ bl nodularity similar   ....... scattered linear and dependent atelectasis   ....... sm bl effsns   ....... mod indeterminate t8 veretebral body fr new   .... ct  5/31/2025   ......... incr small pl effs   ........ dilated pulm arteries suggestive of ph   .... rvp 5/26/2025 rvp (-)   .... no obvious active infectn      CARDIAC.  . ORTHOSTATIC HYPOTENSION  .... has v significant orthostatic hypotension   .... MIDODRINE 10.2 5/26    . CAD    .... Trop 5/27/2025 tr 15       . CHF  .... 5/27/2025  1 + p edema  .... pbnp 5/2705/30/2025 pbnp 2185- 2330   .... tte 4/2025 mild ph  n vf   .... lasix   ........ 5/26 l 40.2   ....... 5/28/2025 l dced   .... torsemide 5/28/2025 t 20   .... check tte     . A fib   .... APIXABA 5/26 a 2.5 x 2   .... AMIODARONE 5/27/2025 5/26 A 100    HEMAT.  .... Plt 5/27/2025 plt 170   ....  monitor    . IMMUNOSUPPRESSIVE  .... LEFLUNAMIDE 10 5/27/2025    . ANEMIA  .... Hb 5/27-5/28-5/9-5/30-5/31/2025   .... HB 10- 8.9- 8.9  - 8.4 - 8.2   .... fa 5/27/2025 fa > 20   .... b12 5/27/2025 b12 1444   .... target hb 7 (+)    . LEUKOPENIA   .... w 5/26-5/27-5/28 W 3 -  1.7 - 2.2    GI.   . DIET .   .... dash 2 liter fr 5/26    RENAL.  . DATA  .... Na 5/27/2025 Na 139   .... CO2 5/27/2025 co2 30   .... monitor    . HYPERKALEMIA   .... K 5/27-5/28/2025 K 5.7 - 4.6     . KIMBERLY ON CKD  .... Cr 5/27-5/28-5/29-5/30-5/31-6/1/2025   .... Cr 1.2 - 1.3 - 1.3 - 1.6 - 1.4 - 1.6    NEURO.  . PAIN  .... TIZANADINE 5/26 T 2 HS     XXXXXXXXXXXXXXXXXXXXXX   SUMMARY BASELINE .     . 85 y/o F with PMH AFon Eliquis, COPD (not on home o2), CKD, aplastic anemia, PMR (chronic prednisone with AVN hip), HLD, HTN, DM pt of Dr Gallegos not on home ox or home cpap used to use trelegy lives with spouse quit 40 y 1/2 ppd  CC.   . 5/26/2025 SHORTNESS OF BREATH   . 5/26/2026 HYPOXEMIA   . 5/26/2025 WHEEZE   MAIN ISSUES.  . HYPOXIA 5/26/2025  . RESP FAILURE   .... 5/30/2025 nict bpap prdrd   .... 6/1/2025 cannot tolerate bpap   . SOB  .... DD COPD CHF   .... venous duplx 5/26 (-)  . COPD ex   . A fib   .... APIXABA 5/26 a 2.5 x 2  . CHF  .... pbnp 5/27/2025 pbnp 2185  . ANEMIA  .... Hb 5/27/2025 HB 10  . LEUKOPENIA   .... w 5/26-5/27 W 3 -  1.7   . KIMBERLY ON CKD  .... Cr 5/27-5/28-5/29-5/30-5/31-6/1/2025   .... Cr 1.2 - 1.3 - 1.3 - 1.6 - 1.4 - 1.6    PMH.   . AFon Eliquis,   . COPD (not on home o2),   . CKD,   . aplastic anemia,   . PMR (chronic prednisone with AVN hip),   . HLD,   . HTN,   . DM    PROCEDURES/DEVICES .      DISCUSSIONS.  .... Discussed with primary care and relevant consultants on an ongoing basis       TIME SPENT.  . Over 36 minutes aggregate care time spent on encounter; activities included   direct patient care, counseling and/or coordinating care reviewing notes, lab data/ imaging , discussion with multidisciplinary team/ patient  /family and explaining in detail risks, benefits, alternatives  of the recommendations     ROBERT HENRIQUEZ 84 5/27/2025 1942

## 2025-06-01 NOTE — CONSULT NOTE ADULT - SUBJECTIVE AND OBJECTIVE BOX
NEPHROLOGY CONSULTATION    CHIEF COMPLAINT: SOB    HPI:  Pt is 85 yo F with PMH AFib on Eliquis, COPD, CKD, aplastic anemia, PMR (chronic prednisone with AVN hip), HLD, HTN, DM who p/w worsening SOB, no CP/ palpitations, has home O2 setup (but doesn't use) but put it on after noticing SpO2 in the 80's. Endorses worsening SWAIN, no recent illness med changes, or changes in diet. No fever, chills, abdominal pain, nausea, vomiting, diarrhea, constipation, urinary frequency, urgency, or dysuria. Feels better on suppl O2.    ROS:  as above    Allergies:  No Known Allergies    PAST MEDICAL & SURGICAL HISTORY:  COPD (chronic obstructive pulmonary disease)  DM (diabetes mellitus)  diet-controlled  PAF (paroxysmal atrial fibrillation)  s/p ablation  Hiatal hernia  H/O aplastic anemia  History of IBS  H/O osteoporosis  HLD (hyperlipidemia)  PMR (polymyalgia rheumatica)  History of basal cell cancer  Chronic kidney disease (CKD)  Hypotension  Presence of IVC filter  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  History of immunodeficiency  Lumbar compression fracture  H/O hiatal hernia  H/O pulmonary fibrosis  H/O sinus bradycardia  History of fracture of right hip  "unoperable"  S/P hysterectomy  S/P foot surgery  S/P knee surgery  After cataract  bilateral eye cataract surgically removed  Closed right hip fracture  rigth hip ORIF july 19 2016  S/P IVC filter  nov 2016  S/P carpal tunnel release  Right 2008 & 6/27/17  H/O kyphoplasty  Port-A-Cath in place  right chest    SOCIAL HISTORY:  negative    FAMILY HISTORY:  bladder cancer (Mother)  myocardial infarction (Father)  atrial fibrillation (Father)    MEDICATIONS  (STANDING):  albuterol    0.083% 2.5 milliGRAM(s) Nebulizer every 6 hours  albuterol    90 MICROgram(s) HFA Inhaler 1 Puff(s) Inhalation every 4 hours  aMIOdarone    Tablet 100 milliGRAM(s) Oral daily  apixaban 2.5 milliGRAM(s) Oral every 12 hours  atorvastatin 10 milliGRAM(s) Oral at bedtime  fluticasone propionate/ salmeterol 250-50 MICROgram(s) Diskus 1 Dose(s) Inhalation two times a day  gabapentin 400 milliGRAM(s) Oral two times a day  leflunomide 10 milliGRAM(s) Oral daily  methylPREDNISolone sodium succinate Injectable 40 milliGRAM(s) IV Push daily  midodrine. 10 milliGRAM(s) Oral <User Schedule>  montelukast 10 milliGRAM(s) Oral daily  pantoprazole    Tablet 40 milliGRAM(s) Oral before breakfast  sodium chloride 3%  Inhalation 4 milliLiter(s) Inhalation every 12 hours  tiotropium 2.5 MICROgram(s) Inhaler 2 Puff(s) Inhalation daily  tiZANidine 2 milliGRAM(s) Oral at bedtime  torsemide 20 milliGRAM(s) Oral daily    Vital Signs Last 24 Hrs  T(C): 36.9 (06-01-25 @ 12:58), Max: 36.9 (06-01-25 @ 12:58)  T(F): 98.5 (06-01-25 @ 12:58), Max: 98.5 (06-01-25 @ 12:58)  HR: 67 (06-01-25 @ 18:43) (55 - 77)  BP: 102/51 (06-01-25 @ 17:19) (101/61 - 105/61)  RR: 18 (06-01-25 @ 12:58) (18 - 18)  SpO2: 99% (06-01-25 @ 18:43) (97% - 99%)    I&O's Detail    31 May 2025 07:01  -  01 Jun 2025 07:00  --------------------------------------------------------  OUT:    Voided (mL): 750 mL  Total OUT: 750 mL    s1s2  b/l air entry  soft, ND  no edema     LABS:                        8.2    3.12  )-----------( 155      ( 01 Jun 2025 07:10 )             25.5     06-01    136  |  96  |  67[H]  ----------------------------<  127[H]  5.1   |  34[H]  |  1.60[H]    Ca    9.9      01 Jun 2025 07:10  Phos  3.6     05-31  Mg     1.8     05-31    TPro  6.1  /  Alb  3.7  /  TBili  0.9  /  DBili  x   /  AST  4[L]  /  ALT  18  /  AlkPhos  33[L]  05-31    LIVER FUNCTIONS - ( 31 May 2025 06:50 )  Alb: 3.7 g/dL / Pro: 6.1 g/dL / ALK PHOS: 33 U/L / ALT: 18 U/L / AST: 4 U/L / GGT: x           A/P:    CM, Afib, COPD, pl eff on imaging  Rising Cr hemodynamic, iso diuresis  UA negative  Cautious diuretics as needed  Avoid nephrotoxins as able  F/u BMP, UO  F/u Echo  If Cr continues to rise, would check renal SONO    [phone]

## 2025-06-01 NOTE — PROGRESS NOTE ADULT - ASSESSMENT
84F Hx HTN, HLD, DM, PAF, CKD, aplastic anemia presented w/ worsening SOB and b/l LE edema; admitted for likely HFpEF exacerbation    - Pt p/w shortness of breath, multifactorial in nature, seemed mostly likely secondary to a COPD  - chronic lower extremity edema resolved    - S/p Lasix 40mg IV BID, and remains on Torsemide 20 daily    - Pt reports worsened SOB yesterday am, repeated ct chest  - effusions appear larger and so gave addl iv lasix with improvement in sxs  -creat again up to 1.6, will not give addl diuretics beyond po torsemide  - Repeat TTE pending   - No evidence of any active ischemia    - Known Afib   - Continue Eliquis, Amiodarone   - Has very significant orthostatic hypotension. and with diuresis her sbp is now only ~100    - Continue Midodrine.

## 2025-06-02 ENCOUNTER — RESULT REVIEW (OUTPATIENT)
Age: 85
End: 2025-06-02

## 2025-06-02 LAB
ALBUMIN SERPL ELPH-MCNC: 3.6 G/DL — SIGNIFICANT CHANGE UP (ref 3.3–5)
ALP SERPL-CCNC: 30 U/L — LOW (ref 40–120)
ALT FLD-CCNC: 26 U/L — SIGNIFICANT CHANGE UP (ref 12–78)
ANION GAP SERPL CALC-SCNC: 7 MMOL/L — SIGNIFICANT CHANGE UP (ref 5–17)
AST SERPL-CCNC: 8 U/L — LOW (ref 15–37)
BASOPHILS # BLD AUTO: 0 K/UL — SIGNIFICANT CHANGE UP (ref 0–0.2)
BASOPHILS NFR BLD AUTO: 0 % — SIGNIFICANT CHANGE UP (ref 0–2)
BILIRUB SERPL-MCNC: 1 MG/DL — SIGNIFICANT CHANGE UP (ref 0.2–1.2)
BUN SERPL-MCNC: 71 MG/DL — HIGH (ref 7–23)
CALCIUM SERPL-MCNC: 9.3 MG/DL — SIGNIFICANT CHANGE UP (ref 8.5–10.1)
CHLORIDE SERPL-SCNC: 97 MMOL/L — SIGNIFICANT CHANGE UP (ref 96–108)
CO2 SERPL-SCNC: 33 MMOL/L — HIGH (ref 22–31)
CREAT SERPL-MCNC: 1.5 MG/DL — HIGH (ref 0.5–1.3)
EGFR: 35 ML/MIN/1.73M2 — LOW
EGFR: 35 ML/MIN/1.73M2 — LOW
EOSINOPHIL # BLD AUTO: 0 K/UL — SIGNIFICANT CHANGE UP (ref 0–0.5)
EOSINOPHIL NFR BLD AUTO: 0 % — SIGNIFICANT CHANGE UP (ref 0–6)
GLUCOSE SERPL-MCNC: 94 MG/DL — SIGNIFICANT CHANGE UP (ref 70–99)
HCT VFR BLD CALC: 22.7 % — LOW (ref 34.5–45)
HCT VFR BLD CALC: 24.2 % — LOW (ref 34.5–45)
HGB BLD-MCNC: 7.6 G/DL — LOW (ref 11.5–15.5)
HGB BLD-MCNC: 7.7 G/DL — LOW (ref 11.5–15.5)
LYMPHOCYTES # BLD AUTO: 0.72 K/UL — LOW (ref 1–3.3)
LYMPHOCYTES # BLD AUTO: 12 % — LOW (ref 13–44)
MCHC RBC-ENTMCNC: 32.3 PG — SIGNIFICANT CHANGE UP (ref 27–34)
MCHC RBC-ENTMCNC: 33.5 G/DL — SIGNIFICANT CHANGE UP (ref 32–36)
MCV RBC AUTO: 96.6 FL — SIGNIFICANT CHANGE UP (ref 80–100)
MONOCYTES # BLD AUTO: 1.5 K/UL — HIGH (ref 0–0.9)
MONOCYTES NFR BLD AUTO: 25 % — HIGH (ref 2–14)
NEUTROPHILS # BLD AUTO: 3.79 K/UL — SIGNIFICANT CHANGE UP (ref 1.8–7.4)
NEUTROPHILS NFR BLD AUTO: 63 % — SIGNIFICANT CHANGE UP (ref 43–77)
NRBC BLD AUTO-RTO: SIGNIFICANT CHANGE UP /100 WBCS (ref 0–0)
PLATELET # BLD AUTO: 136 K/UL — LOW (ref 150–400)
POTASSIUM SERPL-MCNC: 4.4 MMOL/L — SIGNIFICANT CHANGE UP (ref 3.5–5.3)
POTASSIUM SERPL-SCNC: 4.4 MMOL/L — SIGNIFICANT CHANGE UP (ref 3.5–5.3)
PROT SERPL-MCNC: 5.7 G/DL — LOW (ref 6–8.3)
RBC # BLD: 2.35 M/UL — LOW (ref 3.8–5.2)
RBC # FLD: 24.4 % — HIGH (ref 10.3–14.5)
SODIUM SERPL-SCNC: 137 MMOL/L — SIGNIFICANT CHANGE UP (ref 135–145)
WBC # BLD: 6.01 K/UL — SIGNIFICANT CHANGE UP (ref 3.8–10.5)
WBC # FLD AUTO: 6.01 K/UL — SIGNIFICANT CHANGE UP (ref 3.8–10.5)

## 2025-06-02 PROCEDURE — 99232 SBSQ HOSP IP/OBS MODERATE 35: CPT

## 2025-06-02 PROCEDURE — 99233 SBSQ HOSP IP/OBS HIGH 50: CPT

## 2025-06-02 PROCEDURE — 93306 TTE W/DOPPLER COMPLETE: CPT | Mod: 26

## 2025-06-02 RX ORDER — PREDNISONE 20 MG/1
40 TABLET ORAL DAILY
Refills: 0 | Status: DISCONTINUED | OUTPATIENT
Start: 2025-06-02 | End: 2025-06-05

## 2025-06-02 RX ADMIN — Medication 2.5 MILLIGRAM(S): at 00:56

## 2025-06-02 RX ADMIN — LEFLUNOMIDE 10 MILLIGRAM(S): 10 TABLET ORAL at 17:30

## 2025-06-02 RX ADMIN — MIDODRINE HYDROCHLORIDE 10 MILLIGRAM(S): 5 TABLET ORAL at 05:10

## 2025-06-02 RX ADMIN — Medication 40 MILLIGRAM(S): at 05:09

## 2025-06-02 RX ADMIN — Medication 4 MILLILITER(S): at 19:10

## 2025-06-02 RX ADMIN — GABAPENTIN 400 MILLIGRAM(S): 400 CAPSULE ORAL at 17:29

## 2025-06-02 RX ADMIN — Medication 1 DOSE(S): at 05:14

## 2025-06-02 RX ADMIN — TIZANIDINE 2 MILLIGRAM(S): 4 TABLET ORAL at 21:08

## 2025-06-02 RX ADMIN — ATORVASTATIN CALCIUM 10 MILLIGRAM(S): 80 TABLET, FILM COATED ORAL at 21:10

## 2025-06-02 RX ADMIN — APIXABAN 2.5 MILLIGRAM(S): 2.5 TABLET, FILM COATED ORAL at 05:08

## 2025-06-02 RX ADMIN — APIXABAN 2.5 MILLIGRAM(S): 2.5 TABLET, FILM COATED ORAL at 17:29

## 2025-06-02 RX ADMIN — MONTELUKAST SODIUM 10 MILLIGRAM(S): 10 TABLET ORAL at 12:16

## 2025-06-02 RX ADMIN — AMIODARONE HYDROCHLORIDE 100 MILLIGRAM(S): 50 INJECTION, SOLUTION INTRAVENOUS at 05:09

## 2025-06-02 RX ADMIN — TIOTROPIUM BROMIDE INHALATION SPRAY 2 PUFF(S): 3.12 SPRAY, METERED RESPIRATORY (INHALATION) at 05:15

## 2025-06-02 RX ADMIN — GABAPENTIN 400 MILLIGRAM(S): 400 CAPSULE ORAL at 05:08

## 2025-06-02 RX ADMIN — METHYLPREDNISOLONE ACETATE 40 MILLIGRAM(S): 80 INJECTION, SUSPENSION INTRA-ARTICULAR; INTRALESIONAL; INTRAMUSCULAR; SOFT TISSUE at 05:09

## 2025-06-02 RX ADMIN — Medication 2.5 MILLIGRAM(S): at 19:10

## 2025-06-02 RX ADMIN — MIDODRINE HYDROCHLORIDE 10 MILLIGRAM(S): 5 TABLET ORAL at 17:35

## 2025-06-02 RX ADMIN — Medication 2.5 MILLIGRAM(S): at 13:29

## 2025-06-02 NOTE — CASE MANAGEMENT PROGRESS NOTE - NSCMPROGRESSNOTE_GEN_ALL_CORE
Discussed pt on rounds, pt remains acute, CMS STAR, COPD, on o22LNC, on iv solumedrol, H/H 7.6/22.7. for PRBC today. CM will continue to collaborate with interdisciplinary team and remain available to assist.

## 2025-06-02 NOTE — DIETITIAN INITIAL EVALUATION ADULT - PROBLEM SELECTOR PLAN 1
DDx includes CHF exacerbation +/- COPD exacerbation aspect. D-dimer negative, CXR without evidence of airspace consolidation, likely element of interstitial edema, BNP elevated  LE doppler negative for DVT  Recent TTE (4/8/2025): EF 63%, LV hypertrophy, intermediate diastolic dysfunction, mild pHTN, will not repeat currently    CT chest w/o IV contrast ordered for better characterization of source of hypoxia  Strict I/Os, daily weights  Fluid restriction 2L, avoid IVF   PT evaluation  Continuous pulse ox, wean O2 as tolerated to obtain SpO2 88-92% goal.  Admit to tele  Lasix 40mg IV BID per cardiology recs  For potential COPD exacerbation continue duonebs q6h prn, increase home prednisone 5mg qd to 50mg qd x 5 days. pulmonology (Dr. Gallegos) consulted

## 2025-06-02 NOTE — PROGRESS NOTE ADULT - ASSESSMENT
Prerenal azotemia, CKD 3a  Dyspnea: COPD/CHF  Diabetes  Hypertension  Anemia    Stable renal indices. Mild increase in creatinine trend with diuresis. Off lasix now. Will get kidney and bladder sonogram.   Monitor blood sugar levels. Insulin coverage as needed. Trend BP and titrate BP meds as needed. Monitor h/h trend. Transfuse PRBC's PRN.   Avoid nephrotoxic meds as possible. Will follow electrolytes and renal function trend. D/w pt's daughter at bedside.   D/w patient regarding need for out patient nephrology follow up.

## 2025-06-02 NOTE — DIETITIAN INITIAL EVALUATION ADULT - PATIENT MEETS CRITERIA FOR MALNUTRITION
Patient : Sánchez Camargo Age: 25 year old Sex: female   MRN: [id number] Encounter Date: 6/25/2021      History     Chief Complaint   Patient presents with   • MEDICATION ISSUE     HPI    Patient is a 25-year-old female with history of asthma who presents to the ER for medication refill.  She reports she has had worsening asthma over the last few days.  Denies any cough.  She has some wheezing without significant shortness of breath.  No chest pain.  No calf pain or leg swelling.  No history of DVT or PE.  She reports she had a headache a little bit of abdominal pain a few days ago which has completely resolved.  She denies any dysuria or hematuria but reports increased urinary frequency.  Is unsure if she is pregnant.  She is requesting a refill of her albuterol inhaler.    Allergies   Allergen Reactions   • Pollen Extract-Tree Extract    • Dander    • Molds & Smuts        Current Discharge Medication List      Prior to Admission Medications    Details   metroNIDAZOLE (Flagyl) 500 MG tablet Take 1 tablet by mouth 2 times daily.  Qty: 14 tablet, Refills: 0      albuterol 108 (90 Base) MCG/ACT inhaler Inhale 2 puffs into the lungs every 4 hours as needed for Shortness of Breath or Wheezing.  Qty: 1 Inhaler, Refills: 0      amoxicillin (AMOXIL) 500 MG capsule Take 1 capsule by mouth 3 times daily.  Qty: 30 capsule, Refills: Zero      naproxen (NAPROSYN) 375 MG tablet Take 1 tablet by mouth 2 times daily (with meals).  Qty: 15 tablet, Refills: 0      azithromycin (ZITHROMAX Z-JES) 250 MG tablet Take 2 tablets (500 mg) by mouth x 1 day then 1 tablet (250 mg) by mouth daily x 4 days.  Qty: 6 tablet, Refills: 0      azithromycin (ZITHROMAX Z-JES) 250 MG tablet Take 2 tablets (500 mg) by mouth x 1 day then 1 tablet (250 mg) by mouth daily x 4 days.  Qty: 6 tablet, Refills: 0      albuterol 108 (90 Base) MCG/ACT inhaler Inhale 2 puffs into the lungs every 4 hours as needed for Wheezing.  Qty: 8.5 g, Refills: 0      Mometasone  Furo-Formoterol Fum 200-5 MCG/ACT Aerosol Inhale 2 puffs into the lungs Twice daily.      albuterol 108 (90 BASE) MCG/ACT inhaler Inhale 2 puffs into the lungs every 4 hours as needed for Shortness of Breath.      fluticasone (FLONASE) 50 MCG/ACT nasal spray Spray 1 spray in each nostril daily.      albuterol 108 (90 BASE) MCG/ACT inhaler Inhale 2 puffs into the lungs every 4 hours as needed for Wheezing.  Qty: 8.5 g, Refills: 0      predniSONE (DELTASONE) 20 MG tablet Take 2 tablets by mouth daily.  Qty: 10 tablet, Refills: 0      predniSONE (DELTASONE) 50 MG tablet Take 1 tablet by mouth daily.  Qty: 5 tablet, Refills: 0      albuterol 108 (90 BASE) MCG/ACT inhaler Inhale 2 puffs into the lungs every 4 hours as needed for Shortness of Breath or Wheezing.  Qty: 1 Inhaler, Refills: 1      albuterol 108 (90 BASE) MCG/ACT inhaler Inhale 2 puffs into the lungs every 4 hours as needed for Wheezing. CLARIFICATION:in green zone 20 min before physical activity whether wheezing or not.      Cetirizine HCl 10 MG TBDP Take 10 mg by mouth As Directed. As needed for watery eyes and runny nose      Olopatadine HCl (PATADAY) 0.2 % ophthalmic solution Place 1 drop into both eyes as needed. for itchy or watrery eyes      albuterol 108 (90 BASE) MCG/ACT inhaler Inhale 2 puffs into the lungs every 4 hours as needed for Wheezing.  Qty: 8.5 g, Refills: 0      predniSONE (DELTASONE) 20 MG tablet Take 2 tablets by mouth daily.  Qty: 10 tablet, Refills: 0      hydrocortisone (CORTIZONE) 2.5 % cream Apply 1 application topically 3 times daily. apply to affected areas (inner elbow and behind the knees)      albuterol (VENTOLIN) 108 (90 BASE) MCG/ACT inhaler Inhale 2 puffs into the lungs Every 6 hours. In green zone take  2 puffs with spacer before sports, exercise;  in yellow zone 4 puffs every 2-4 hours;  in red zone 8 puffs every 20 min X 3        montelukast (SINGULAIR) 10 MG tablet Take 10 mg by mouth nightly.         New Prescriptions     Details   cephalexin (Keflex) 500 MG capsule Take 1 capsule by mouth 2 times daily for 7 days.  Qty: 14 capsule, Refills: 0      albuterol 108 (90 Base) MCG/ACT inhaler Inhale 2 puffs into the lungs every 4 hours as needed for Wheezing.  Qty: 8.5 g, Refills: 0      predniSONE (DELTASONE) 20 MG tablet Take 2 tablets by mouth daily for 3 days.  Qty: 6 tablet, Refills: 0             Past Medical History:   Diagnosis Date   • Asthma    • RAD (reactive airway disease)        No past surgical history on file.    No family history on file.    Social History     Tobacco Use   • Smoking status: Passive Smoke Exposure - Never Smoker   • Smokeless tobacco: Never Used   Substance Use Topics   • Alcohol use: No   • Drug use: No       E-cigarette/Vaping     E-Cigarette/Vaping Substances & Devices       Review of Systems   Constitutional: Negative for appetite change, chills, diaphoresis, fatigue and fever.   HENT: Negative for congestion, ear pain, facial swelling, nosebleeds, postnasal drip, rhinorrhea, sneezing and sore throat.    Eyes: Negative for photophobia, pain, redness and visual disturbance.   Respiratory: Positive for wheezing. Negative for cough, chest tightness and shortness of breath.    Cardiovascular: Negative for chest pain, palpitations and leg swelling.   Gastrointestinal: Negative for abdominal distention, abdominal pain, blood in stool, constipation, diarrhea, nausea and vomiting.   Genitourinary: Positive for frequency. Negative for decreased urine volume, difficulty urinating, dysuria, flank pain, hematuria, urgency, vaginal bleeding and vaginal discharge.   Musculoskeletal: Negative for arthralgias, back pain, gait problem, joint swelling, myalgias, neck pain and neck stiffness.   Skin: Negative for color change, pallor, rash and wound.   Allergic/Immunologic: Negative for immunocompromised state.   Neurological: Negative for dizziness, seizures, syncope, weakness, light-headedness, numbness and  headaches.       Physical Exam     ED Triage Vitals [06/25/21 1117]   ED Triage Vitals Group      Temp 98.5 °F (36.9 °C)      Heart Rate 79      Resp 20      BP (!) 145/84      SpO2 98 %      EtCO2 mmHg       Height       Weight       Weight Scale Used       BMI (Calculated)       IBW/kg (Calculated)        Physical Exam  Constitutional:       General: She is not in acute distress.     Appearance: She is well-developed. She is not diaphoretic.   HENT:      Head: Normocephalic and atraumatic.      Right Ear: External ear normal.      Left Ear: External ear normal.      Nose: Nose normal.      Mouth/Throat:      Pharynx: No oropharyngeal exudate.   Eyes:      General:         Right eye: No discharge.         Left eye: No discharge.      Conjunctiva/sclera: Conjunctivae normal.      Pupils: Pupils are equal, round, and reactive to light.   Neck:      Vascular: No JVD.   Cardiovascular:      Rate and Rhythm: Normal rate and regular rhythm.      Heart sounds: Normal heart sounds. No murmur heard.   No friction rub. No gallop.    Pulmonary:      Effort: Pulmonary effort is normal. No respiratory distress.      Breath sounds: No stridor. Wheezing (mild end expiratory wheezing ) present. No rales.   Chest:      Chest wall: No tenderness.   Abdominal:      General: Bowel sounds are normal. There is no distension.      Palpations: Abdomen is soft. There is no mass.      Tenderness: There is no abdominal tenderness. There is no guarding or rebound.   Musculoskeletal:         General: No tenderness or deformity. Normal range of motion.      Cervical back: Normal range of motion and neck supple.   Lymphadenopathy:      Cervical: No cervical adenopathy.   Skin:     General: Skin is warm and dry.      Coloration: Skin is not pale.      Findings: No erythema or rash.   Neurological:      Mental Status: She is alert and oriented to person, place, and time.   Psychiatric:         Behavior: Behavior normal.         Thought Content:  Thought content normal.         Judgment: Judgment normal.         ED Course     Procedures    Lab Results     Results for orders placed or performed during the hospital encounter of 06/25/21   URINALYSIS, MACROSCOPIC -POINT OF CARE   Result Value Ref Range    COLOR - POINT OF CARE Yellow     APPEARANCE, URINALYSIS - POINT OF CARE Hazy     GLUCOSE, URINALYSIS - POINT OF CARE Negative Negative mg/dL    BILIRUBIN, URINALYSIS - POINT OF CARE Negative Negative    KETONES, URINALYSIS - POINT OF CARE Negative Negative mg/dL    SPECIFIC GRAVITY, URINALYSIS - POINT OF CARE 1.025 1.005 - 1.030 NULL    OCCULT BLOOD, URINALYSIS - POINT OF CARE Negative Negative    PH, URINALYSIS - POINT OF CARE 7.0 5.0 - 7.0 Units    PROTEIN, URINALYSIS - POINT OF CARE Negative Negative mg/dL    UROBILINOGEN, URINALYSIS - POINT OF CARE 0.2 0.2, 1.0 mg/dL    NITRITE, URINALYSIS - POINT OF CARE Positive (A) Negative    WBC ESTERASE, URINALYSIS - POINT OF CARE Moderate (A) Negative   HCG POC   Result Value Ref Range    HCG, URINE - POINT OF CARE Negative Negative       Radiology Results     Imaging Results          XR Chest AP or PA (Final result)  Result time 06/25/21 13:21:41    Final result                 Impression:    IMPRESSION: No acute cardiopulmonary disease. No interval change.                 Narrative:    HISTORY: Shortness of breath and wheezing    EXAM: AP chest x-ray    COMPARISON: November 16, 2017    FINDINGS: The lungs are clear, with sharp costophrenic angles. The heart  size and pulmonary vasculature are normal. The mediastinal contour is  normal. There is no pneumothorax.                                ED Medication Orders (From admission, onward)    Ordered Start     Status Ordering Provider    06/25/21 1238 06/25/21 1239  ipratropium-albuterol (DUONEB) 0.5-2.5 (3) MG/3ML nebulizer solution 3 mL  ONCE      Last MAR action: Given DORIS, ARLET    06/25/21 1118 06/25/21 1119  albuterol inhaler 2 puff  ONCE      Last MAR  action: Given ARNALDO FRAZIER           [facility]    # asthma exacerbation, UTI, medication refill- patient is a 25-year-old female presents to the ER with concern for asthma attack.  She reports she has an albuterol inhaler at home but ran out of her medication.  Denies any chest pain, shortness of breath, or fevers.  Chest x-ray without any signs of pneumonia.  Patient was given 1 DuoNeb in the ER with significant relief of symptoms.  She was given an albuterol inhaler in the ED to take home with her.  She is also given a refill as well as a short course of steroids.  Additionally, patient reported some vague lower abdominal discomfort without any nausea or vomiting.  Abdomen is nontender.  She is having some urinary frequency. Urine dip shows positive nitrite and leukocyte esterase.  Will treat for UTI.  No evidence of pyelonephritis or sepsis.  Bacterial culture sent.  Strict return to ED precautions discussed.  Patient understands and agrees with plan.  All questions answered.    Clinical Impression     ED Diagnosis   1. Mild intermittent asthma with exacerbation     2. Urinary tract infection without hematuria, site unspecified     3. Medication refill          Summary of your Discharge Medications      Take these Medications      Details   * albuterol 108 (90 Base) MCG/ACT inhaler   Inhale 2 puffs into the lungs Every 6 hours. In green zone take  2 puffs with spacer before sports, exercise;  in yellow zone 4 puffs every 2-4 hours;  in red zone 8 puffs every 20 min X 3  Comment: Albuterol updated on SOC     * albuterol 108 (90 Base) MCG/ACT inhaler   Inhale 2 puffs into the lungs every 4 hours as needed for Wheezing.     * albuterol 108 (90 Base) MCG/ACT inhaler   Inhale 2 puffs into the lungs every 4 hours as needed for Wheezing. CLARIFICATION:in green zone 20 min before physical activity whether wheezing or not.     * albuterol 108 (90 Base) MCG/ACT inhaler   Inhale 2 puffs into the lungs every 4 hours as needed  for Shortness of Breath or Wheezing.     * albuterol 108 (90 Base) MCG/ACT inhaler   Inhale 2 puffs into the lungs every 4 hours as needed for Wheezing.     * albuterol 108 (90 Base) MCG/ACT inhaler   Inhale 2 puffs into the lungs every 4 hours as needed for Shortness of Breath.     * albuterol 108 (90 Base) MCG/ACT inhaler   Inhale 2 puffs into the lungs every 4 hours as needed for Wheezing.     * albuterol 108 (90 Base) MCG/ACT inhaler   Inhale 2 puffs into the lungs every 4 hours as needed for Shortness of Breath or Wheezing.     * albuterol 108 (90 Base) MCG/ACT inhaler   Inhale 2 puffs into the lungs every 4 hours as needed for Wheezing.     cephalexin 500 MG capsule  Commonly known as: Keflex   Take 1 capsule by mouth 2 times daily for 7 days.     * predniSONE 20 MG tablet  Commonly known as: DELTASONE   Take 2 tablets by mouth daily.     * predniSONE 50 MG tablet  Commonly known as: DELTASONE   Take 1 tablet by mouth daily.     * predniSONE 20 MG tablet  Commonly known as: DELTASONE   Take 2 tablets by mouth daily.     * predniSONE 20 MG tablet  Commonly known as: DELTASONE   Take 2 tablets by mouth daily for 3 days.         * This list has 13 medication(s) that are the same as other medications prescribed for you. Read the directions carefully, and ask your doctor or other care provider to review them with you.                  Disposition        Discharge 6/25/2021  1:45 PM  Sánchez Camargo discharge to home/self care.                         Inez Brooks PA-C  06/25/21 5613     no

## 2025-06-02 NOTE — PROGRESS NOTE ADULT - ASSESSMENT
84F Hx HTN, HLD, DM, PAF, CKD, aplastic anemia presented w/ worsening SOB and b/l LE edema; admitted for likely HFpEF exacerbation    - Pt p/w shortness of breath, multifactorial in nature, seemed mostly likely secondary to a COPD  - chronic lower extremity edema resolved    - S/p Lasix 40mg IV BID, and remains on Torsemide 20 daily    - Pt reports worsened SOB yesterday am, repeated ct chest, effusions appear larger and so gave addl iv lasix with improvement in sxs  - Creat again up to 1.6, will not give addl diuretics beyond po torsemide  - Creatinine:  <--1.50,  <--1.60,  <--1.40  - Repeat TTE normal LV and RV size and function, EF 71%, PASP 54, increased LV mass and hypertrophy   + Anemia,, for 2 x 1/2 units PRBC transfusion     - No evidence of any active ischemia  - Continue statin      - Known Afib   - Continue Eliquis, Amiodarone     - BP stable and controlled  - Has very significant orthostatic hypotension. and with diuresis her sbp is now only ~100    - Continue Midodrine.       - Monitor and replete lytes, keep K>4, Mg>2.  - Will continue to follow.    Oliver Olvera, MS FNP, AGACNP  Nurse Practitioner- Cardiology   Please call on TEAMS

## 2025-06-02 NOTE — PROGRESS NOTE ADULT - SUBJECTIVE AND OBJECTIVE BOX
Patient Transferred to: [facility]  Handoff Report Given to: rn        CHIEF COMPLAINT/ REASON FOR VISIT  .. Patient was seen to address the  issue listed under PROBLEM LIST which is located toward bottom of this note     MARTINEZ PATEL 2EAS 201 D1    Allergies    No Known Allergies    Intolerances        PAST MEDICAL & SURGICAL HISTORY:  COPD (chronic obstructive pulmonary disease)      DM (diabetes mellitus)  diet-controlled      PAF (paroxysmal atrial fibrillation)  s/p ablation      Hiatal hernia      H/O aplastic anemia      History of IBS      H/O osteoporosis      HLD (hyperlipidemia)      PMR (polymyalgia rheumatica)      History of basal cell cancer      Chronic kidney disease (CKD)      Hypotension      Presence of IVC filter      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      History of immunodeficiency      Lumbar compression fracture      H/O hiatal hernia      H/O pulmonary fibrosis      H/O sinus bradycardia      History of fracture of right hip  "unoperable"      S/P hysterectomy      S/P foot surgery      S/P knee surgery      After cataract  bilateral eye cataract surgically removed      Closed right hip fracture  rigth hip ORIF july 19 2016      S/P IVC filter  nov 2016      S/P carpal tunnel release  Right 2008 & 6/27/17      H/O kyphoplasty      Port-A-Cath in place  right chest          FAMILY HISTORY:  Family history of bladder cancer (Mother)    Family history of myocardial infarction (Father)    FH: atrial fibrillation (Father)        Home Medications:  amiodarone 200 mg oral tablet: 0.5 tab(s) orally once a day (27 May 2025 13:21)  Bentyl 10 mg oral capsule: 1 cap(s) orally 2 times a day, As Needed (27 May 2025 13:22)  Eliquis 2.5 mg oral tablet: 1 tab(s) orally 2 times a day (27 May 2025 12:39)  esomeprazole 20 mg oral delayed release capsule: 1 cap(s) orally once a day (27 May 2025 13:23)  folic acid 1 mg oral tablet: 1 tab(s) orally once a day (in the morning) (27 May 2025 12:39)  gabapentin 400 mg oral capsule: 1 cap(s) orally 2 times a day (27 May 2025 12:39)  IVIG monthly:  (27 May 2025 12:39)  leflunomide 10 mg oral tablet: 1 tab(s) orally once a day (27 May 2025 13:23)  midodrine 5 mg oral tablet: 2 tab(s) orally in the morning and 1 tab orally in the evening (27 May 2025 13:23)  montelukast 10 mg oral tablet: 1 tab(s) orally once a day (27 May 2025 13:23)  predniSONE 5 mg oral tablet: 1 tab(s) orally once a day (27 May 2025 13:22)  Procrit 10,000 units/mL preservative-free injectable solution: injectable once a week WEDNESDAY (27 May 2025 12:39)  Reclast Infusion , IV x 1 yearly:  (27 May 2025 13:22)  simvastatin 10 mg oral tablet: 1 tab(s) orally once a day (at bedtime) (27 May 2025 12:39)  tiZANidine: 2 tab(s) orally once a day (at bedtime) as needed for  muscle spasm (27 May 2025 13:22)  torsemide 20 mg oral tablet: 1 tab(s) orally once a day (in the morning) (27 May 2025 12:39)      MEDICATIONS  (STANDING):  albuterol    0.083% 2.5 milliGRAM(s) Nebulizer every 6 hours  albuterol    90 MICROgram(s) HFA Inhaler 1 Puff(s) Inhalation every 4 hours  aMIOdarone    Tablet 100 milliGRAM(s) Oral daily  apixaban 2.5 milliGRAM(s) Oral every 12 hours  atorvastatin 10 milliGRAM(s) Oral at bedtime  fluticasone propionate/ salmeterol 250-50 MICROgram(s) Diskus 1 Dose(s) Inhalation two times a day  gabapentin 400 milliGRAM(s) Oral two times a day  leflunomide 10 milliGRAM(s) Oral daily  midodrine. 10 milliGRAM(s) Oral <User Schedule>  montelukast 10 milliGRAM(s) Oral daily  pantoprazole    Tablet 40 milliGRAM(s) Oral before breakfast  predniSONE   Tablet 40 milliGRAM(s) Oral daily  sodium chloride 3%  Inhalation 4 milliLiter(s) Inhalation every 12 hours  tiotropium 2.5 MICROgram(s) Inhaler 2 Puff(s) Inhalation daily  tiZANidine 2 milliGRAM(s) Oral at bedtime  torsemide 20 milliGRAM(s) Oral daily    MEDICATIONS  (PRN):  albuterol/ipratropium for Nebulization 3 milliLiter(s) Nebulizer every 6 hours PRN Shortness of Breath and/or Wheezing  dicyclomine 10 milliGRAM(s) Oral two times a day before meals PRN bowel irritability  melatonin 3 milliGRAM(s) Oral at bedtime PRN Insomnia      Diet, Regular:   DASH/TLC Sodium & Cholesterol Restricted  2000mL Fluid Restriction ([id number]) (05-26-25 @ 23:27) [Active]          Vital Signs Last 24 Hrs  T(C): 36.7 (02 Jun 2025 04:22), Max: 36.9 (01 Jun 2025 12:58)  T(F): 98.1 (02 Jun 2025 04:22), Max: 98.5 (01 Jun 2025 12:58)  HR: 60 (02 Jun 2025 04:22) (60 - 77)  BP: 91/55 (02 Jun 2025 04:22) (91/55 - 105/61)  BP(mean): --  RR: 18 (02 Jun 2025 04:22) (18 - 18)  SpO2: 97% (02 Jun 2025 04:22) (95% - 99%)    Parameters below as of 02 Jun 2025 04:22  Patient On (Oxygen Delivery Method): nasal cannula  O2 Flow (L/min): 2        06-01-25 @ 07:01  -  06-02-25 @ 07:00  --------------------------------------------------------  IN: 180 mL / OUT: 0 mL / NET: 180 mL              LABS:                        7.6    6.01  )-----------( 136      ( 02 Jun 2025 06:05 )             22.7     06-02    137  |  97  |  71[H]  ----------------------------<  94  4.4   |  33[H]  |  1.50[H]    Ca    9.3      02 Jun 2025 06:05    TPro  5.7[L]  /  Alb  3.6  /  TBili  1.0  /  DBili  x   /  AST  8[L]  /  ALT  26  /  AlkPhos  30[L]  06-02      Urinalysis Basic - ( 02 Jun 2025 06:05 )    Color: x / Appearance: x / SG: x / pH: x  Gluc: 94 mg/dL / Ketone: x  / Bili: x / Urobili: x   Blood: x / Protein: x / Nitrite: x   Leuk Esterase: x / RBC: x / WBC x   Sq Epi: x / Non Sq Epi: x / Bacteria: x            WBC:  WBC Count: 6.01 K/uL (06-02 @ 06:05)  WBC Count: 3.12 K/uL (06-01 @ 07:10)  WBC Count: 2.90 K/uL (05-31 @ 06:50)  WBC Count: 2.08 K/uL (05-30 @ 06:17)      MICROBIOLOGY:  RECENT CULTURES:                  Sodium:  Sodium: 137 mmol/L (06-02 @ 06:05)  Sodium: 136 mmol/L (06-01 @ 07:10)  Sodium: 139 mmol/L (05-31 @ 06:50)  Sodium: 140 mmol/L (05-30 @ 06:17)      1.50 mg/dL 06-02 @ 06:05  1.60 mg/dL 06-01 @ 07:10  1.40 mg/dL 05-31 @ 06:50  1.60 mg/dL 05-30 @ 06:17      Hemoglobin:  Hemoglobin: 7.6 g/dL (06-02 @ 06:05)  Hemoglobin: 8.2 g/dL (06-01 @ 07:10)  Hemoglobin: 8.2 g/dL (05-31 @ 06:50)  Hemoglobin: 8.4 g/dL (05-30 @ 06:17)      Platelets: Platelet Count - Automated: 136 K/uL (06-02 @ 06:05)  Platelet Count - Automated: 155 K/uL (06-01 @ 07:10)  Platelet Count - Automated: 156 K/uL (05-31 @ 06:50)  Platelet Count - Automated: 141 K/uL (05-30 @ 06:17)      LIVER FUNCTIONS - ( 02 Jun 2025 06:05 )  Alb: 3.6 g/dL / Pro: 5.7 g/dL / ALK PHOS: 30 U/L / ALT: 26 U/L / AST: 8 U/L / GGT: x             Urinalysis Basic - ( 02 Jun 2025 06:05 )    Color: x / Appearance: x / SG: x / pH: x  Gluc: 94 mg/dL / Ketone: x  / Bili: x / Urobili: x   Blood: x / Protein: x / Nitrite: x   Leuk Esterase: x / RBC: x / WBC x   Sq Epi: x / Non Sq Epi: x / Bacteria: x        RADIOLOGY & ADDITIONAL STUDIES:      MICROBIOLOGY:  RECENT CULTURES:

## 2025-06-02 NOTE — PROGRESS NOTE ADULT - SUBJECTIVE AND OBJECTIVE BOX
[facility] -- Sai Valle, Girish Jackson Savella, David López: Office # [phone]    Follow Up:  SOB, Anemia    Subjective/Observations: Patient seen and examined. Patient awake, alert, resting in bed. No complaints of chest pain, dyspnea, palpitations or dizziness. No signs of orthopnea or PND.  Tolerating O2 via nasal cannula. + SWAIN    REVIEW OF SYSTEMS: All other review of systems are negative unless indicated above    PAST MEDICAL & SURGICAL HISTORY:  COPD (chronic obstructive pulmonary disease)      DM (diabetes mellitus)  diet-controlled      PAF (paroxysmal atrial fibrillation)  s/p ablation      Hiatal hernia      H/O aplastic anemia      History of IBS      H/O osteoporosis      HLD (hyperlipidemia)      PMR (polymyalgia rheumatica)      History of basal cell cancer      Chronic kidney disease (CKD)      Hypotension      Presence of IVC filter      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      History of immunodeficiency      Lumbar compression fracture      H/O hiatal hernia      H/O pulmonary fibrosis      H/O sinus bradycardia      History of fracture of right hip  "unoperable"      S/P hysterectomy      S/P foot surgery      S/P knee surgery      After cataract  bilateral eye cataract surgically removed      Closed right hip fracture  rigth hip ORIF july 19 2016      S/P IVC filter  nov 2016      S/P carpal tunnel release  Right 2008 & 6/27/17      H/O kyphoplasty      Port-A-Cath in place  right chest          MEDICATIONS  (STANDING):  albuterol    0.083% 2.5 milliGRAM(s) Nebulizer every 6 hours  albuterol    90 MICROgram(s) HFA Inhaler 1 Puff(s) Inhalation every 4 hours  aMIOdarone    Tablet 100 milliGRAM(s) Oral daily  apixaban 2.5 milliGRAM(s) Oral every 12 hours  atorvastatin 10 milliGRAM(s) Oral at bedtime  fluticasone propionate/ salmeterol 250-50 MICROgram(s) Diskus 1 Dose(s) Inhalation two times a day  gabapentin 400 milliGRAM(s) Oral two times a day  leflunomide 10 milliGRAM(s) Oral daily  midodrine. 10 milliGRAM(s) Oral <User Schedule>  montelukast 10 milliGRAM(s) Oral daily  pantoprazole    Tablet 40 milliGRAM(s) Oral before breakfast  predniSONE   Tablet 40 milliGRAM(s) Oral daily  sodium chloride 3%  Inhalation 4 milliLiter(s) Inhalation every 12 hours  tiotropium 2.5 MICROgram(s) Inhaler 2 Puff(s) Inhalation daily  tiZANidine 2 milliGRAM(s) Oral at bedtime  torsemide 20 milliGRAM(s) Oral daily    MEDICATIONS  (PRN):  albuterol/ipratropium for Nebulization 3 milliLiter(s) Nebulizer every 6 hours PRN Shortness of Breath and/or Wheezing  dicyclomine 10 milliGRAM(s) Oral two times a day before meals PRN bowel irritability  melatonin 3 milliGRAM(s) Oral at bedtime PRN Insomnia    Allergies    No Known Allergies    Intolerances      Vital Signs Last 24 Hrs  T(C): 37.1 (02 Jun 2025 11:34), Max: 37.1 (02 Jun 2025 11:34)  T(F): 98.8 (02 Jun 2025 11:34), Max: 98.8 (02 Jun 2025 11:34)  HR: 64 (02 Jun 2025 13:34) (55 - 72)  BP: 100/43 (02 Jun 2025 11:34) (91/55 - 102/51)  BP(mean): --  RR: 18 (02 Jun 2025 11:34) (18 - 18)  SpO2: 100% (02 Jun 2025 13:34) (95% - 100%)    Parameters below as of 02 Jun 2025 13:34  Patient On (Oxygen Delivery Method): nasal cannula w/ humidification      I&O's Summary    01 Jun 2025 07:01  -  02 Jun 2025 07:00  --------------------------------------------------------  IN: 180 mL / OUT: 0 mL / NET: 180 mL          TELE:  PAC  PHYSICAL EXAM:  Constitutional: NAD, awake and alert  HEENT: Moist Mucous Membranes, Anicteric  Pulmonary: Non-labored, breath sounds are clear bilaterally, Diminished at bases No wheezing, rales or rhonchi  Cardiovascular: Regular, S1 and S2, No murmurs, No rubs, gallops or clicks  Gastrointestinal:  soft, nontender, nondistended   Lymph: No peripheral edema. No lymphadenopathy.   Skin: No visible rashes or ulcers.  Psych:  Mood & affect appropriate      LABS: All Labs Reviewed:                        7.7    x     )-----------( x        ( 02 Jun 2025 09:55 )             24.2                         7.6    6.01  )-----------( 136      ( 02 Jun 2025 06:05 )             22.7                         8.2    3.12  )-----------( 155      ( 01 Jun 2025 07:10 )             25.5     02 Jun 2025 06:05    137    |  97     |  71     ----------------------------<  94     4.4     |  33     |  1.50   01 Jun 2025 07:10    136    |  96     |  67     ----------------------------<  127    5.1     |  34     |  1.60   31 May 2025 06:50    139    |  98     |  59     ----------------------------<  130    4.6     |  35     |  1.40     Ca    9.3        02 Jun 2025 06:05  Ca    9.9        01 Jun 2025 07:10  Ca    9.9        31 May 2025 06:50  Phos  3.6       31 May 2025 06:50  Mg     1.8       31 May 2025 06:50    TPro  5.7    /  Alb  3.6    /  TBili  1.0    /  DBili  x      /  AST  8      /  ALT  26     /  AlkPhos  30     02 Jun 2025 06:05  TPro  6.1    /  Alb  3.7    /  TBili  0.9    /  DBili  x      /  AST  4      /  ALT  18     /  AlkPhos  33     31 May 2025 06:50   LIVER FUNCTIONS - ( 02 Jun 2025 06:05 )  Alb: 3.6 g/dL / Pro: 5.7 g/dL / ALK PHOS: 30 U/L / ALT: 26 U/L / AST: 8 U/L / GGT: x           Troponin I, High Sensitivity Result: 15.2 ng/L (05-26-25 @ 18:00)  D-Dimer Assay, Quantitative: <150 ng/mL DDU (05-26-25 @ 18:00)    12 Lead ECG:   Ventricular Rate 72 BPM    Atrial Rate 62 BPM    P-R Interval 132 ms    QRS Duration 80 ms    Q-T Interval 418 ms    QTC Calculation(Bazett) 457 ms    P Axis 83 degrees    R Axis -63 degrees    T Axis 63 degrees    Diagnosis Line Sinus rhythm with premature atrial complexes  Pulmonary disease pattern  Left anterior fascicular block  Abnormal ECG  When compared with ECG of 02-SEP-2023 11:31,  premature atrial complexes are now present  Confirmed by ROSALVA GOODRICH ([id number]) on 5/27/2025 6:48:02 PM (05-26-25 @ 17:20)      TRANSTHORACIC ECHOCARDIOGRAM REPORT  ________________________________________________________________________________                                      _______       Pt. Name:       MARTINEZ JONES Study Date:    6/2/2025  MRN:            [id number]        YOB: 1940  Accession #:    [id number]       Age:           84 years  Account#:       [id number]      Gender:        F  Heart Rate:                     Height:        62.99 in (160.00 cm)  Rhythm:                         Weight:        154.32 lb (70.00 kg)  Blood Pressure: 91/55 mmHg      BSA/BMI:       1.73 m² / 27.34 kg/m²  ________________________________________________________________________________________  Referring Physician:    [id number] James Osei  Interpreting Physician: Daniel Jorge M.D.  Primary Sonographer:    Kelsey Monson RDCS    CPT:               ECHO TTE WO CON COMP W DOPP - 37258.m  Indication(s):     Cardiomyopathy, unspecified - I42.9  Procedure:         Transthoracic echocardiogram with 2-D, M-mode and complete                     spectral and color flow Doppler.  Ordering Location: [facility]  Admission Status:  Inpatient    _______________________________________________________________________________________     CONCLUSIONS:      1. Leftventricular systolic function is normal with an ejection fraction of 71 % by Arias's method of disks.   2. Normal right ventricular cavity size, with normal wall thickness, and normal right ventricular systolic function.   3. Left atrium is moderately dilated.   4. Mild mitral regurgitation.   5. Estimated pulmonary artery systolic pressure is 54 mmHg.   6. Mild pulmonic regurgitation.   7. Mild tricuspid regurgitation.   8. Small bilateral pleural effusion noted.   9. There is increased LV mass and concentric hypertrophy.    ________________________________________________________________________________________  FINDINGS:     Left Ventricle:  Left ventricular systolic function is normal with a calculated ejection fraction of 71 % by the Arias's biplane method of disks. Moderate left ventricular hypertrophy. There is increased LV mass and concentric hypertrophy.     Right Ventricle:  The right ventricular cavity is normal in size, with normal wall thickness and right ventricular systolic function is normal.     Left Atrium:  The left atrium is moderately dilated with an indexed volume of 44.98 ml/m².     Right Atrium:  The right atrium is normal in size with an indexed volume of 21.88 ml/m² and an indexed area of 8.72 cm²/m².     Aortic Valve:  The aortic valve is tricuspid with normal leaflet excursion. There is mild thickening of the aortic valve leaflets.     Mitral Valve:  Structurally normal mitral valve with normal leaflet excursion. There is calcification of the mitralvalve annulus. There is mild leaflet calcification. There is mild mitral regurgitation.     Tricuspid Valve:  The tricuspid valve is structurally normal with normal leaflet excursion. There is mild tricuspid regurgitation. Estimated pulmonary artery systolic pressure is 54 mmHg.     Pulmonic Valve:  Structurally normal pulmonic valve with normal leaflet excursion. There is mild pulmonic regurgitation.     Aorta:  The aortic root at the sinuses of Valsalva is normal in size, measuring 3.00 cm (indexed 1.73 cm/m²). The aortic arch diameter is normal in size, measuring 2.4 cm (indexed 1.39 cm/m²).     Pericardium:  There is a trace pericardial effusion.     Pleura:  Small bilateral pleural effusion noted.     Systemic Veins:  The inferior vena cava is normal in size measuring 1.77 cm in diameter, (normal <2.1cm) with abnormal inspiratory collapse (abnormal <50%) consistent with mildly elevated right atrial pressure (~8, range 5-10mmHg).  ____________________________________________________________________  QUANTITATIVE DATA:  Left Ventricle Measurements: (Indexed to BSA)     IVSd (2D):   1.3 cm  LVPWd (2D):  1.2 cm  LVIDd (2D):  4.5 cm  LVIDs (2D):  2.7 cm  LV Mass:     199 g  114.9 g/m²  LV Vol d, MOD A2C: 66.0 ml 38.11 ml/m²  LV Vol d,MOD A4C: 61.7 ml 35.63 ml/m²  LV Vol d, MOD BP:  63.9 ml 36.89 ml/m²  LV Vol s, MOD A2C: 19.4 ml 11.20 ml/m²  LV Vol s, MOD A4C: 16.4 ml 9.47 ml/m²  LV Vol s, MOD BP:  18.2 ml 10.52 ml/m²  LVOT SV MOD BP:    45.7 ml  LV EF% MOD BP:     71 %     MV E Vmax:    1.23 m/s  MV A Vmax:    0.68 m/s  MV E/A:       1.82  e' lateral:   7.51 cm/s  e' medial:    3.05 cm/s  E/e' lateral: 16.38  E/e' medial:  40.33  E/e' Average: 23.30  MV DT:        207 msec    Aorta Measurements: (Normal range) (Indexed to BSA)     Ao Root d 3.00 cm (2.7 - 3.3 cm) 1.73 cm/m²  Ao Arch:  2.4 cm            Left Atrium Measurements: (Indexed to BSA)  LA Diam 2D: 4.10 cm         Right Atrial Measurements:     RA Vol s, MOD A4C         37.9 ml  RA Vol s, MOD A4C i BSA   21.88 ml/m²  RA Area s, MOD A4C        15.1 cm²  RA Area s, MOD A4C, i BSA 8.72 cm²/m²    Mitral Valve Measurements:     MV E Vmax: 1.2 m/s         MR Vmax:          4.19 m/s  MV A Vmax: 0.7 m/s         MR Peak Gradient: 70.2 mmHg  MV E/A:    1.8       Tricuspid Valve Measurements:     TR Vmax:          3.4 m/s  TR Peak Gradient: 45.7 mmHg  RA Pressure:      8 mmHg  PASP:             54 mmHg    ________________________________________________________________________________________  Electronically signedon 6/2/2025 at 1:09:26 PM by Daniel Jorge M.D.         *** Final ***

## 2025-06-02 NOTE — ASU PREOP CHECKLIST - AS BP NONINV METHOD
Chronic low back pain followed by Dr. Brown, no nonsteroidal anti-inflammatory agents.  States takes Neurontin and Norco   electronic

## 2025-06-02 NOTE — PROGRESS NOTE ADULT - ASSESSMENT
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

## 2025-06-02 NOTE — PROGRESS NOTE ADULT - SUBJECTIVE AND OBJECTIVE BOX
[INTERVAL HX: ]  Patient seen and examined;  Chart reviewed and events noted;     [MEDICATIONS]  MEDICATIONS  (STANDING):  albuterol    0.083% 2.5 milliGRAM(s) Nebulizer every 6 hours  albuterol    90 MICROgram(s) HFA Inhaler 1 Puff(s) Inhalation every 4 hours  aMIOdarone    Tablet 100 milliGRAM(s) Oral daily  apixaban 2.5 milliGRAM(s) Oral every 12 hours  atorvastatin 10 milliGRAM(s) Oral at bedtime  fluticasone propionate/ salmeterol 250-50 MICROgram(s) Diskus 1 Dose(s) Inhalation two times a day  gabapentin 400 milliGRAM(s) Oral two times a day  leflunomide 10 milliGRAM(s) Oral daily  midodrine. 10 milliGRAM(s) Oral <User Schedule>  montelukast 10 milliGRAM(s) Oral daily  pantoprazole    Tablet 40 milliGRAM(s) Oral before breakfast  predniSONE   Tablet 40 milliGRAM(s) Oral daily  sodium chloride 3%  Inhalation 4 milliLiter(s) Inhalation every 12 hours  tiotropium 2.5 MICROgram(s) Inhaler 2 Puff(s) Inhalation daily  tiZANidine 2 milliGRAM(s) Oral at bedtime  torsemide 20 milliGRAM(s) Oral daily    MEDICATIONS  (PRN):  albuterol/ipratropium for Nebulization 3 milliLiter(s) Nebulizer every 6 hours PRN Shortness of Breath and/or Wheezing  dicyclomine 10 milliGRAM(s) Oral two times a day before meals PRN bowel irritability  melatonin 3 milliGRAM(s) Oral at bedtime PRN Insomnia      [VITALS]  Vital Signs Last 24 Hrs  T(C): 36.8 (2025 16:06), Max: 37.1 (2025 11:34)  T(F): 98.3 (2025 16:06), Max: 98.8 (2025 11:34)  HR: 58 (2025 16:06) (55 - 72)  BP: 105/62 (2025 16:06) (91/55 - 105/62)  BP(mean): --  RR: 18 (2025 16:06) (18 - 18)  SpO2: 98% (2025 16:06) (95% - 100%)    Parameters below as of 2025 16:06  Patient On (Oxygen Delivery Method): nasal cannula  O2 Flow (L/min): 2    [WT/HT]  Daily     Daily Weight in k.4 (2025 04:22)  [VENT]      [PHYSICAL EXAM]  GEN: NAD  HEENT: normocephalic and atraumatic. EOMI. PERRL.    NECK: Supple.  No lymphadenopathy   LUNGS: Clear to auscultation.  HEART: Regular rate and rhythm,  no MRG  ABDOMEN: Soft, nontender, and nondistended.  Positive bowel sounds.    : No CVA tenderness  EXTREMITIES: Without edema.  NEUROLOGIC: grossly intact.  PSYCHIATRIC: Appropriate affect .  SKIN: No rash     [LABS:]                        7.7    x     )-----------( x        ( 2025 09:55 )             24.2         137  |  97  |  71[H]  ----------------------------<  94  4.4   |  33[H]  |  1.50[H]    Ca    9.3      2025 06:05    TPro  5.7[L]  /  Alb  3.6  /  TBili  1.0  /  DBili  x   /  AST  8[L]  /  ALT  26  /  AlkPhos  30[L]  -          Folate, Serum: >20.0 ng/mL (25 @ 09:20)    Vitamin B12, Serum: 1444 pg/mL *H* [232 - 1245] (25 @ 09:20)    Iron - Total Binding Capacity.: Unable to calculate Test Repeated ug/dL [220 - 430] (23 @ 14:00)    Ferritin: 1342 ng/mL *H* [13 - 330] (23 @ 14:00)    Reticulocyte Count (23 @ 14:00)  Reticulocyte Percent: 2.0 % [0.5 - 2.5]  Absolute Reticulocytes: 39.0 K/uL [25.0 - 125.0]    Vitamin B12, Serum: 663 pg/mL [232 - 1245] (23 @ 14:00)    Folate, Serum: 16.2 ng/mL (23 @ 14:00)    Serum Protein Electrophoresis Interp: Normal Electrophoresis Pattern (23 @ 14:00)    Immunofixation, Serum:   No Monoclonal Band Identified      Reference Range: None Detected (23 @ 14:00)      Urinalysis Basic - ( 2025 06:05 )    Color: x / Appearance: x / SG: x / pH: x  Gluc: 94 mg/dL / Ketone: x  / Bili: x / Urobili: x   Blood: x / Protein: x / Nitrite: x   Leuk Esterase: x / RBC: x / WBC x   Sq Epi: x / Non Sq Epi: x / Bacteria: x        SARS-CoV-2: NotDetec (26 May 2025 18:00)          [RADIOLOGY STUDIES:]

## 2025-06-02 NOTE — PROGRESS NOTE ADULT - ASSESSMENT
REASON FOR VISIT  .. Management of problems listed below      EVENTS/CURRENT ISSUES.  .... 6/2/2025 pt cannot tolerate bpap   .... 5/30/2025 abg shows metabolic alkalosis and resp acidosis noct bpap ordered   .... 5/29/2025 pred changed solumed   .... 5/28/2025 lasix changed to torsemide   .... 5/27/2025 being rxed for copd chf   .... 5/27/2025 85 y/o F with PMH AFon Eliquis, COPD (not on home o2), CKD, aplastic anemia, PMR (chronic prednisone with AVN hip aw sob wheeze         REVIEW OF SYMPTOMS   Able to give ROS  Yes     RELIABILITY +/-   CONSTITUTIONAL Weakness Yes    ENDOCRINE  No heat or cold intolerance    ALLERGY No hives  Sore throat No stridor  RESP Shortness of breath YES   NEURO New weakness No   CARDIAC   Palpitations No         PHYSICAL EXAM    HEENT Unremarkable  atraumatic   RESP Fair air entry  Harsh breath sound   CARDIAC S1 S2 No S3     NO JVD    ABDOMEN No hepatosplenomegaly   PEDAL EDEMA present No calf tenderness  NO rash     REASON FOR VISIT  .. Management of problems listed below      CC.   . 5/26/2025 c/o shortness of breath- was 87% on room air- patient was wheezing- poatient given 1 albuterol treatment by ems- on 4 litres patient saturating at 96%  shortness of breath  OVERALL PRESENTATION.  . 5/26/2025 85 y/o F with PMH AFon Eliquis, COPD (not on home o2), CKD, aplastic anemia, PMR (chronic prednisone with AVN hip), HLD, HTN, DM who p/w worsening SOB over the past two days, denies CP/ palpitations, has home O2 setup (but doesn't use) but put it on after noticing SpO2 in the 80's, which improved SOB. Endorses worsening SWAIN, denies significant wheezing, cough, recent illness med changes, or changes in diet. Adherent with home meds.    Denies fever, chills, chest pain, palpitations, cough, abdominal pain, nausea, vomiting, diarrhea, constipation, urinary frequency, urgency, or dysuria, headaches, changes in vision, dizziness, numbness, tingling.    ED Course:   Vitals: BP: 130/77, HR: 77, Temp: 98, RR: 26, SpO2: 96% on 4L NC  Labs: BNP 2185, trop WNL, WBC: 3.08 (around baseline), Hb 8.9 () (around baseline), HCO3- 32,  RVP negative,     CXR: linear interstitial prominence in b/l bases, trace pleural fluid, potential cardiogenic pulmonary edema, medi-port tip at cavoatrial junction, as per personal read, official read pending   B/L LE Dopplers: No evidence of DVT  EKG: NSR PACs  Received in the ED: lasix 20mg IV, solumedrol 125mg IV, duonebs  PMH.      PAST HOSPITAL STAYS .  Home Medications:   * No Current Medications as of 05-May-2025 09:54 documented in Structured Notes  · folic acid 1 mg oral tablet: 1 tab(s) orally once a day (in the morning)  · midodrine 5 mg oral tablet: 1 tablet orally 3 times a day  · torsemide 20 mg oral tablet: 1 tab(s) orally once a day (in the morning)  · gabapentin 400 mg oral capsule: 1 cap(s) orally 2 times a day  · tiZANidine: 2 tab(s) orally once a day (at bedtime)  · Singulair 10 mg oral tablet: 1 tab(s) orally once a day (in the morning)  · Bentyl 10 mg oral capsule: 1 cap(s) orally 2 times a day, As Needed  · Leflunomide: once a day (after a meal)  · NexIUM 20 mg oral delayed release capsule: 1 cap(s) orally once a day (in the morning)  · simvastatin 10 mg oral tablet: 1 tab(s) orally once a day (at bedtime)  · Reclast Infusion , IV x 1 yearly:   · Vitamin D3 , 1 tablet by mouth 3x a week:   · IVIG monthly:   · amiodarone 100 mg oral tablet: 1 tab(s) orally once a day (in the morning)  · oxyCODONE 5 mg oral tablet: 1 tab(s) orally prn  · Eliquis 2.5 mg oral tablet: 1 tab(s) orally 2 times a day  · Procrit 10,000   ER MGMT .        BEST PRACTICE ISSUES.  . HOB ELEVATN.    .... Yes  . DIET  .   .... DASH 2L FR 5/26  . FREE WATER.   ....   .  IV FLUID.  .....   . PHARMAC DVT PPLX .    .... APIXABA 5/26 a 2.5 x 2 (nv af)   . NON PHARMAC DVT PPLX .      . STRESS ULCR PPLX .   .... PROTONIX 40 5/27/2025  . DATE/DM MGMT.   ..... See under Endocrine section   GENERAL DATA .   . COVID.         .... scv2 5/27/2025 (-)   . GOC.    ....    . ICU STAY.    .... no   . INFECTION PPLX .   ....   . ALLGY.   ....  nka  . WT.   .... 5/27/2025 64  . BMI.  .... 5/27/2025 25       [id number]  VITALS/GAS EXCHANGE/DRIPS    ABGS.   . 5/27 declined ordered abg   . 5/30/2025 8a 3l 744/51/86   .  VS/ PO/IO/ VENT/ DRIPS.  6/2/2025 afeb 61 100/50   6/2/2025 2l 98%     XXXXXXXXXXXXXXXXXXXXXXXXXXXXXXXXXXX  PROBLEM ASSESSMENT RECOMMENDATIONS.  RESP.   . GAS EXCHANGE .   .... target PO 90-95%   .... 5/28/2025 check abg to see if shes retaining co2     . HYPERCAPNIA   .... 5/30/2025 8a 3l 744/51/86   .... 5/30/2025 tco2 34   .... 5/30/2025 abg analysis suggests metabolic alkalosis plus resp acidosis   .... 5/30/2025 as pt co sob will try noct bpap 12/6/16/28     . SOB  .... DD COPD CHF     . RO VTE  .... venous duplx 5/26 (-)    . COPD  .... GIVEN PREDNISONE 50 5/27-5/29/2025 X 5D  .... SOLU   ........ 40.2 5/29/2025   ........ 40 5/31-6/2/2025   .... PRED 40 6/2/2025  .... NACL 3% bid 5/29   .... DUONEB.4 P 5/27/2025  .... ALBUTEROL.4 5/27  .... ADVAIR 250 5/27   .... MONTELUKAST 10 5/30/2025   .... SPIRIVA 5/27    INFECTION.  . DATA  .... w 5/26-5/27-5/29-5/30 W 3 -  1.7 - 3.2- 2   .... pr 5/27/2025 pr .29   .... cxr 5/26 napd   .... ct  5/27/2025 cw 9/2/2023   ........ mild tracheobr secretns   ........ mosaic attenuation   ........ bl nodularity similar   ....... scattered linear and dependent atelectasis   ....... sm bl effsns   ....... mod indeterminate t8 veretebral body fr new   .... ct  5/31/2025   ......... incr small pl effs   ........ dilated pulm arteries suggestive of ph   .... rvp 5/26/2025 rvp (-)   .... no obvious active infectn      CARDIAC.  . ORTHOSTATIC HYPOTENSION  .... has v significant orthostatic hypotension   .... MIDODRINE 10.2 5/26    . CAD    .... Trop 5/27/2025 tr 15       . CHF  .... 5/27/2025  1 + p edema  .... pbnp 5/2705/30/2025 pbnp 2185- 2330   .... tte 4/2025 mild ph  n vf   .... lasix   ........ 5/26 l 40.2   ....... 5/28/2025 l dced   .... torsemide 5/28/2025 t 20   .... check tte     . A fib   .... APIXABA 5/26 a 2.5 x 2   .... AMIODARONE 5/27/2025 5/26 A 100    HEMAT.  .... Plt 5/27/2025 plt 170   ....  monitor    . IMMUNOSUPPRESSIVE  .... LEFLUNAMIDE 10 5/27/2025    . ANEMIA  .... Hb 5/27-5/28-5/9-5/30-5/31-6/2/2025   .... HB 10- 8.9- 8.9  - 8.4 - 8.2 - 7.6   .... fa 5/27/2025 fa > 20   .... b12 5/27/2025 b12 1444   .... target hb 7 (+)    . TRANSFUSION  .... 6/2/2025 1 u prbc    . LEUKOPENIA   .... w 5/26-5/27-5/28 W 3 -  1.7 - 2.2    GI.   . DIET .   .... dash 2 liter fr 5/26    RENAL.  . DATA  .... Na 5/27/2025 Na 139   .... CO2 5/27/2025 co2 30   .... monitor    . HYPERKALEMIA   .... K 5/27-5/28/2025 K 5.7 - 4.6     . KIMBERLY ON CKD  .... Cr 5/27-5/28-5/29-5/30-5/31-6/1/2025   .... Cr 1.2 - 1.3 - 1.3 - 1.6 - 1.4 - 1.6    NEURO.  . PAIN  .... TIZANADINE 5/26 T 2 HS     XXXXXXXXXXXXXXXXXXXXXX   SUMMARY BASELINE .     . 85 y/o F with PMH AFon Eliquis, COPD (not on home o2), CKD, aplastic anemia, PMR (chronic prednisone with AVN hip), HLD, HTN, DM pt of Dr Gallegos not on home ox or home cpap used to use Family Petlegy lives with spouse quit 40 y 1/2 ppd  CC.   . 5/26/2025 SHORTNESS OF BREATH   . 5/26/2026 HYPOXEMIA   . 5/26/2025 WHEEZE   MAIN ISSUES.  . HYPOXIA 5/26/2025  . RESP FAILURE   .... 5/30/2025 nict bpap prdrd   .... 6/1/2025 cannot tolerate bpap   . SOB  .... DD COPD CHF   .... venous duplx 5/26 (-)  . COPD ex   . A fib   .... APIXABA 5/26 a 2.5 x 2  . CHF  .... pbnp 5/27/2025 pbnp 2185  . ANEMIA  .... Hb 5/27/2025 HB 10  . TRANSFUSION  .... 6/2/2025 1 u prbc   . LEUKOPENIA   .... w 5/26-5/27 W 3 -  1.7   . KIMBERLY ON CKD  .... Cr 5/27-5/28-5/29-5/30-5/31-6/1/2025   .... Cr 1.2 - 1.3 - 1.3 - 1.6 - 1.4 - 1.6    PMH.   . AFon Eliquis,   . COPD (not on home o2),   . CKD,   . aplastic anemia,   . PMR (chronic prednisone with AVN hip),   . HLD,   . HTN,   . DM    PROCEDURES/DEVICES .      DISCUSSIONS.  .... Discussed with primary care and relevant consultants on an ongoing basis       TIME SPENT.  . Over 36 minutes aggregate care time spent on encounter; activities included   direct patient care, counseling and/or coordinating care reviewing notes, lab data/ imaging , discussion with multidisciplinary team/ patient  /family and explaining in detail risks, benefits, alternatives  of the recommendations     ROBERT HENRIQUEZ 84 5/27/2025 1942

## 2025-06-02 NOTE — CASE MANAGEMENT PROGRESS NOTE - NSCMPROGRESSNOTE_GEN_ALL_CORE
CM consult for CVC/PICC Line POA noted and reviewed. Pt has vascular access to RCW in place prior to hospitalization. CM will continue to collaborate with interdisciplinary team, remain available to assist and monitor for home care needs.

## 2025-06-02 NOTE — DIETITIAN INITIAL EVALUATION ADULT - PROBLEM SELECTOR PLAN 5
Takes prednisone 5mg daily, chronically, will continue increase to 50mg qd x 5 days for potential COPD exacerbation. Return back to home 5mg qd when increased dose no longer clinically warranted or completed course.     Continue home leflunomide (10mg), pharmacy only carries 20mg and cannot split tabs. Patient instructed to bring in home medication to bring to pharmacy to dispense. Primary team to confirm patient receiving this medication.

## 2025-06-02 NOTE — PROVIDER CONTACT NOTE (OTHER) - SITUATION
2 halves unit ordered. 1 unit released by blood bank and administered slowly & well tolerated by Pt. MD made aware that 1 unit was administered slowly, Pt is stable , no sign of distress.

## 2025-06-02 NOTE — PROGRESS NOTE ADULT - SUBJECTIVE AND OBJECTIVE BOX
[facility]                                                       Dr. Chisholm, Dr. Franklin, Dr. Baron, Dr. Clark, Dr. Stock                                      [facility], [facility], [address]                                                 [address] [place] [address]                                      Ph: [phone]  Fax: [phone]                                         Ph: [phone]  Fax: [phone]      Patient is a 84y old  Female who presents with a chief complaint of Shortness of breath (02 Jun 2025 14:05)  Patient seen in follow up for CKD 3.        PAST MEDICAL HISTORY:  COPD (chronic obstructive pulmonary disease)    DM (diabetes mellitus)    Pulmonary fibrosis    PAF (paroxysmal atrial fibrillation)    Hiatal hernia    GIB (gastrointestinal bleeding)    Pericarditis    Shoulder fracture, right    Hematoma    H/O aplastic anemia    History of IBS    H/O osteoporosis    HLD (hyperlipidemia)    PMR (polymyalgia rheumatica)    History of basal cell cancer    Chronic kidney disease (CKD)    Hypotension    Presence of IVC filter    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    History of immunodeficiency    Lumbar compression fracture    H/O hiatal hernia    H/O pulmonary fibrosis    H/O sinus bradycardia    History of fracture of right hip      MEDICATIONS  (STANDING):  albuterol    0.083% 2.5 milliGRAM(s) Nebulizer every 6 hours  albuterol    90 MICROgram(s) HFA Inhaler 1 Puff(s) Inhalation every 4 hours  aMIOdarone    Tablet 100 milliGRAM(s) Oral daily  apixaban 2.5 milliGRAM(s) Oral every 12 hours  atorvastatin 10 milliGRAM(s) Oral at bedtime  fluticasone propionate/ salmeterol 250-50 MICROgram(s) Diskus 1 Dose(s) Inhalation two times a day  gabapentin 400 milliGRAM(s) Oral two times a day  leflunomide 10 milliGRAM(s) Oral daily  midodrine. 10 milliGRAM(s) Oral <User Schedule>  montelukast 10 milliGRAM(s) Oral daily  pantoprazole    Tablet 40 milliGRAM(s) Oral before breakfast  predniSONE   Tablet 40 milliGRAM(s) Oral daily  sodium chloride 3%  Inhalation 4 milliLiter(s) Inhalation every 12 hours  tiotropium 2.5 MICROgram(s) Inhaler 2 Puff(s) Inhalation daily  tiZANidine 2 milliGRAM(s) Oral at bedtime  torsemide 20 milliGRAM(s) Oral daily    MEDICATIONS  (PRN):  albuterol/ipratropium for Nebulization 3 milliLiter(s) Nebulizer every 6 hours PRN Shortness of Breath and/or Wheezing  dicyclomine 10 milliGRAM(s) Oral two times a day before meals PRN bowel irritability  melatonin 3 milliGRAM(s) Oral at bedtime PRN Insomnia    T(C): 37.1 (06-02-25 @ 11:34), Max: 37.1 (06-02-25 @ 11:34)  HR: 64 (06-02-25 @ 13:34) (55 - 77)  BP: 100/43 (06-02-25 @ 11:34) (91/55 - 105/61)  RR: 18 (06-02-25 @ 11:34) (18 - 18)  SpO2: 100% (06-02-25 @ 13:34) (95% - 100%)  Wt(kg): --  I&O's Detail    01 Jun 2025 07:01  -  02 Jun 2025 07:00  --------------------------------------------------------  IN:    Oral Fluid: 180 mL  Total IN: 180 mL    OUT:  Total OUT: 0 mL    Total NET: 180 mL          PHYSICAL EXAM:  General: No distress  Respiratory: b/l air entry  Cardiovascular: S1 S2  Gastrointestinal: soft  Extremities:  edema                              7.7    x     )-----------( x        ( 02 Jun 2025 09:55 )             24.2     06-02    137  |  97  |  71[H]  ----------------------------<  94  4.4   |  33[H]  |  1.50[H]    Ca    9.3      02 Jun 2025 06:05    TPro  5.7[L]  /  Alb  3.6  /  TBili  1.0  /  DBili  x   /  AST  8[L]  /  ALT  26  /  AlkPhos  30[L]  06-02        LIVER FUNCTIONS - ( 02 Jun 2025 06:05 )  Alb: 3.6 g/dL / Pro: 5.7 g/dL / ALK PHOS: 30 U/L / ALT: 26 U/L / AST: 8 U/L / GGT: x           Urinalysis Basic - ( 02 Jun 2025 06:05 )    Color: x / Appearance: x / SG: x / pH: x  Gluc: 94 mg/dL / Ketone: x  / Bili: x / Urobili: x   Blood: x / Protein: x / Nitrite: x   Leuk Esterase: x / RBC: x / WBC x   Sq Epi: x / Non Sq Epi: x / Bacteria: x        Sodium, Serum: 137 (06-02 @ 06:05)  Sodium, Serum: 136 (06-01 @ 07:10)  Sodium, Serum: 139 (05-31 @ 06:50)  Sodium, Serum: 140 (05-30 @ 06:17)    Creatinine, Serum: 1.50 (06-02 @ 06:05)  Creatinine, Serum: 1.60 (06-01 @ 07:10)  Creatinine, Serum: 1.40 (05-31 @ 06:50)  Creatinine, Serum: 1.60 (05-30 @ 06:17)    Potassium, Serum: 4.4 (06-02 @ 06:05)  Potassium, Serum: 5.1 (06-01 @ 07:10)  Potassium, Serum: 4.6 (05-31 @ 06:50)  Potassium, Serum: 5.2 (05-30 @ 06:17)    Hemoglobin: 7.7 (06-02 @ 09:55)  Hemoglobin: 7.6 (06-02 @ 06:05)  Hemoglobin: 8.2 (06-01 @ 07:10)  Hemoglobin: 8.2 (05-31 @ 06:50)

## 2025-06-02 NOTE — DIETITIAN INITIAL EVALUATION ADULT - PERTINENT LABORATORY DATA
06-02    137  |  97  |  71[H]  ----------------------------<  94  4.4   |  33[H]  |  1.50[H]    Ca    9.3      02 Jun 2025 06:05    TPro  5.7[L]  /  Alb  3.6  /  TBili  1.0  /  DBili  x   /  AST  8[L]  /  ALT  26  /  AlkPhos  30[L]  06-02  A1C with Estimated Average Glucose Result: 5.1 % (07-24-24 @ 10:50)

## 2025-06-02 NOTE — PROGRESS NOTE ADULT - SUBJECTIVE AND OBJECTIVE BOX
Patient is a 84y old  Female who presents with a chief complaint of Shortness of breath (01 Jun 2025 20:03)       INTERVAL HPI/OVERNIGHT EVENTS: Patient seen and examined at bedside. No new complaints, still feels sob upon ambulation. No chest pain, palpitations nausea, vomiting or diarrhea     MEDICATIONS  (STANDING):  albuterol    0.083% 2.5 milliGRAM(s) Nebulizer every 6 hours  albuterol    90 MICROgram(s) HFA Inhaler 1 Puff(s) Inhalation every 4 hours  aMIOdarone    Tablet 100 milliGRAM(s) Oral daily  apixaban 2.5 milliGRAM(s) Oral every 12 hours  atorvastatin 10 milliGRAM(s) Oral at bedtime  fluticasone propionate/ salmeterol 250-50 MICROgram(s) Diskus 1 Dose(s) Inhalation two times a day  gabapentin 400 milliGRAM(s) Oral two times a day  leflunomide 10 milliGRAM(s) Oral daily  midodrine. 10 milliGRAM(s) Oral <User Schedule>  montelukast 10 milliGRAM(s) Oral daily  pantoprazole    Tablet 40 milliGRAM(s) Oral before breakfast  predniSONE   Tablet 40 milliGRAM(s) Oral daily  sodium chloride 3%  Inhalation 4 milliLiter(s) Inhalation every 12 hours  tiotropium 2.5 MICROgram(s) Inhaler 2 Puff(s) Inhalation daily  tiZANidine 2 milliGRAM(s) Oral at bedtime  torsemide 20 milliGRAM(s) Oral daily    MEDICATIONS  (PRN):  albuterol/ipratropium for Nebulization 3 milliLiter(s) Nebulizer every 6 hours PRN Shortness of Breath and/or Wheezing  dicyclomine 10 milliGRAM(s) Oral two times a day before meals PRN bowel irritability  melatonin 3 milliGRAM(s) Oral at bedtime PRN Insomnia      Allergies    No Known Allergies    Intolerances        REVIEW OF SYSTEMS:  Per HPI. All other ROS Noted Negative   Vital Signs Last 24 Hrs  T(C): 36.7 (02 Jun 2025 04:22), Max: 36.9 (01 Jun 2025 12:58)  T(F): 98.1 (02 Jun 2025 04:22), Max: 98.5 (01 Jun 2025 12:58)  HR: 60 (02 Jun 2025 04:22) (60 - 77)  BP: 91/55 (02 Jun 2025 04:22) (91/55 - 105/61)  BP(mean): --  RR: 18 (02 Jun 2025 04:22) (18 - 18)  SpO2: 97% (02 Jun 2025 04:22) (95% - 99%)    Parameters below as of 02 Jun 2025 04:22  Patient On (Oxygen Delivery Method): nasal cannula  O2 Flow (L/min): 2      PHYSICAL EXAM:  GENERAL: NAD, Awake, alert, able to speak in full sentences   HEAD:  Atraumatic, Normocephalic  EYES: EOMI, PERRLA, conjunctiva and sclera clear  ENMT: No tonsillar erythema, exudates, or enlargement; Moist mucous membranes  NECK: Supple, No JVD, Normal thyroid  NERVOUS SYSTEM:  Alert & Oriented X3, No focal motor/sensory deficits noted   CHEST/LUNG: Decreased bibasilar breath sounds no wheezing   HEART: Regular rate and rhythm; No murmurs, rubs, or gallops  ABDOMEN: Soft, Nontender, Nondistended; Bowel sounds present  EXTREMITIES:  2+ Peripheral Pulses, No clubbing, cyanosis  LYMPH: No lymphadenopathy noted  SKIN: No rashes or lesions    LABS:                        7.6    6.01  )-----------( 136      ( 02 Jun 2025 06:05 )             22.7     02 Jun 2025 06:05    137    |  97     |  71     ----------------------------<  94     4.4     |  33     |  1.50     Ca    9.3        02 Jun 2025 06:05    TPro  5.7    /  Alb  3.6    /  TBili  1.0    /  DBili  x      /  AST  8      /  ALT  26     /  AlkPhos  30     02 Jun 2025 06:05      CAPILLARY BLOOD GLUCOSE        BLOOD CULTURE    RADIOLOGY & ADDITIONAL TESTS:    Imaging Personally Reviewed:  [ ] YES     Consultant(s) Notes Reviewed:      Care Discussed with Consultants/Other Providers:

## 2025-06-02 NOTE — DIETITIAN INITIAL EVALUATION ADULT - OTHER INFO
85 y/o female adm from home with COPD. PMH right hip fx, DM (no longer on meds), pulmonary fibrosis, hiatal hernia, hypotension, IBS, hiatal hernia, COPD. Pt visited at bedside this morning. Pt reports that she is eating well, no concerns. #. Currently weighing 155.2# (3+ edema to right/left leg). Lives home with  and prepares meals.

## 2025-06-02 NOTE — CHART NOTE - NSCHARTNOTEFT_GEN_A_CORE
Called by RN that pt was ordered to have two 1/2 units of RBC administered separately today. The lab sent up 1 full unit instead which was administered to the pt earlier today. As per RN, Dr. Osei will d/c the original 1/2 unit orders and order 1 unit RBC instead. Pt is asymptomatic at this time and with no complaints.

## 2025-06-02 NOTE — DIETITIAN INITIAL EVALUATION ADULT - PROBLEM SELECTOR PLAN 6
Chronic aplastic anemia, baseline Hb, gets frequent infusions and transfusions  No signs of bleeding

## 2025-06-02 NOTE — DIETITIAN INITIAL EVALUATION ADULT - PROBLEM SELECTOR PLAN 2
No longer taking trelegy inhaler, stopped 6 mo ago by Dr. Gallegos (pulm)  wean O2 as tolerated to obtain SpO2 88-92% goal.  For potential COPD exacerbation continue duonebs q6h prn, prednisone 50 mg x5 days,  pulmonology (Dr. Gallegos) consulted

## 2025-06-02 NOTE — DIETITIAN INITIAL EVALUATION ADULT - NSICDXPASTMEDICALHX_GEN_ALL_CORE_FT
"Requested Prescriptions   Pending Prescriptions Disp Refills     JUNEL 1.5/30 1.5-30 MG-MCG tablet [Pharmacy Med Name: JUNEL 1.5/30 TABLETS 21] 21 tablet      Sig: TAKE 1 TABLET BY MOUTH DAILY WITHOUT PLACEBO WEEK       Contraceptives Protocol Failed - 12/23/2022  3:16 AM        Failed - Recent (12 mo) or future (30 days) visit within the authorizing provider's specialty     Patient has had an office visit with the authorizing provider or a provider within the authorizing providers department within the previous 12 mos or has a future within next 30 days. See \"Patient Info\" tab in inbasket, or \"Choose Columns\" in Meds & Orders section of the refill encounter.              Passed - Patient is not a current smoker if age is 35 or older        Passed - Medication is active on med list        Passed - No active pregnancy on record        Passed - No positive pregnancy test in past 12 months           Last Written Prescription Date:  12/28/22  Last Fill Quantity: 28,  # refills: 0   Last office visit: 9/10/2021 with prescribing provider:  Jignesh   Future Office Visit:   Next 5 appointments (look out 90 days)    Mar 21, 2023  3:00 PM  PHYSICAL with Mela Egan MD  [facility] [place] ([facility] [place] ) [address] [place] [id number] [phone]         Medication is being filled for 1 time refill only due to:  Patient needs to be seen because it has been more than one year since last visit.  José Hoff RN on 12/23/2022 at 10:08 AM          "
PAST MEDICAL HISTORY:  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     Chronic kidney disease (CKD)     COPD (chronic obstructive pulmonary disease)     DM (diabetes mellitus) diet-controlled    H/O aplastic anemia     H/O hiatal hernia     H/O osteoporosis     H/O pulmonary fibrosis     H/O sinus bradycardia     Hiatal hernia     History of basal cell cancer     History of fracture of right hip "unoperable"    History of IBS     History of immunodeficiency     HLD (hyperlipidemia)     Hypotension     Lumbar compression fracture     PAF (paroxysmal atrial fibrillation) s/p ablation    PMR (polymyalgia rheumatica)     Presence of IVC filter

## 2025-06-02 NOTE — PROGRESS NOTE ADULT - ASSESSMENT
85 y/o F with PMH AFon Eliquis, COPD, CKD, aplastic anemia, PMR (chronic prednisone with AVN hip), HLD, HTN, DM who p/w worsening SOB over the past few days admitted for likely CHF exacerbation.        Problem/Plan - 1:  ·  Problem: Acute hypoxic respiratory failure.   ·  Plan: Probably secondary to acute on chronic COPD exacerbation aspect with acute on chronic diastolic heart failure exacerbation (since resolved). D-dimer negative, CXR without evidence of airspace consolidation, likely element of interstitial edema, BNP elevated  LE doppler negative for DVT  Recent TTE (4/8/2025): EF 63%, LV hypertrophy, intermediate diastolic dysfunction, mild pHTN. Repeat Echo Pending     CT chest w/o IV contrast- mild B/L pleural effusions- also noted T8 vertebral body fx- patient aware of fracture- states occurred last year.  Strict I/Os, daily weights  Fluid restriction 2L, avoid IVF   PT evaluation- recommend no skilled PT needs  Continuous pulse ox, wean O2 as tolerated to obtain SpO2 88-92% goal.  Patient diuresed well- switched from IV lasix to home torsemide 20mg daily. PRN IV Lasix   For COPD exacerbation continue duonebs q6h prn, switched  from PO prednisone to IV solumedrol for now. pulmonology (Dr. Baxter) consulted, appreciate recs.  d/w Cardio, recommended repeat CT chest non contrast since patient still sob, reviewed the result. + for bilat pleural effusions and Pulm hTN.      Problem/Plan - 2:  ·  Problem: Chronic obstructive pulmonary disease (COPD).   ·  Plan: No longer taking trelegy inhaler, stopped 6 mo ago by Dr. Gallegos (pulm)  wean O2 as tolerated to obtain SpO2 88-92% goal.  For potential COPD exacerbation continue duonebs q6h prn, IV solumedrol now dc and started on Prednisone 40mg daily.  pulmonology (Dr. Baxter)   nebusal to help thin secretions.  Chest PT      Problem/Plan - 3:  ·  Problem: Diabetes mellitus.   ·  Plan: Not on home medications  Consistent carb diet  Insulin sliding scale  Continue home gabapentin.     Problem/Plan - 4:  ·  Problem: Atrial fibrillation.   ·  Plan: Continue home eliquis 2.5 mg BID and amiodarone 100mg qd.     Problem/Plan - 5:  ·  Problem: Polymyalgia rheumatica.   ·  Plan: Takes prednisone 5mg daily, chronically, will increase to solumedrol for COPD exacerbation. Return back to home 5mg qd when increased dose no longer clinically warranted or completed course.     Continue home leflunomide (10mg), pharmacy only carries 20mg and cannot split tabs. Patient brought home medication- verified by pharmacy.     Problem/Plan - 6:  ·  Problem: Aplastic anemia.   ·  Plan: Chronic aplastic anemia, baseline Hb, gets frequent infusions and transfusions. At high risk of fluid overload due to CHF.   No signs of bleeding  Heme/Onc following      Problem/Plan - 7:  ·  Problem: Hypotension.   ·  Plan: Continue home midodrine 10mg BID with hold parameters.     Problem/Plan - 8:  ·  Problem: HLD (hyperlipidemia).   ·  Plan: Continue home simvastatin 10mg qd as atorvastatin 10mg.     Problem/Plan - 9:  ·  Problem: Need for prophylactic measure.   ·  Plan: DVT: c/w home eliquis 2.5mg BID.

## 2025-06-02 NOTE — DIETITIAN INITIAL EVALUATION ADULT - PERTINENT MEDS FT
MEDICATIONS  (STANDING):  albuterol    0.083% 2.5 milliGRAM(s) Nebulizer every 6 hours  albuterol    90 MICROgram(s) HFA Inhaler 1 Puff(s) Inhalation every 4 hours  aMIOdarone    Tablet 100 milliGRAM(s) Oral daily  apixaban 2.5 milliGRAM(s) Oral every 12 hours  atorvastatin 10 milliGRAM(s) Oral at bedtime  fluticasone propionate/ salmeterol 250-50 MICROgram(s) Diskus 1 Dose(s) Inhalation two times a day  gabapentin 400 milliGRAM(s) Oral two times a day  leflunomide 10 milliGRAM(s) Oral daily  midodrine. 10 milliGRAM(s) Oral <User Schedule>  montelukast 10 milliGRAM(s) Oral daily  pantoprazole    Tablet 40 milliGRAM(s) Oral before breakfast  predniSONE   Tablet 40 milliGRAM(s) Oral daily  sodium chloride 3%  Inhalation 4 milliLiter(s) Inhalation every 12 hours  tiotropium 2.5 MICROgram(s) Inhaler 2 Puff(s) Inhalation daily  tiZANidine 2 milliGRAM(s) Oral at bedtime  torsemide 20 milliGRAM(s) Oral daily    MEDICATIONS  (PRN):  albuterol/ipratropium for Nebulization 3 milliLiter(s) Nebulizer every 6 hours PRN Shortness of Breath and/or Wheezing  dicyclomine 10 milliGRAM(s) Oral two times a day before meals PRN bowel irritability  melatonin 3 milliGRAM(s) Oral at bedtime PRN Insomnia

## 2025-06-03 ENCOUNTER — TRANSCRIPTION ENCOUNTER (OUTPATIENT)
Age: 85
End: 2025-06-03

## 2025-06-03 LAB
ANION GAP SERPL CALC-SCNC: 6 MMOL/L — SIGNIFICANT CHANGE UP (ref 5–17)
BUN SERPL-MCNC: 68 MG/DL — HIGH (ref 7–23)
CALCIUM SERPL-MCNC: 9.3 MG/DL — SIGNIFICANT CHANGE UP (ref 8.5–10.1)
CHLORIDE SERPL-SCNC: 100 MMOL/L — SIGNIFICANT CHANGE UP (ref 96–108)
CO2 SERPL-SCNC: 33 MMOL/L — HIGH (ref 22–31)
CREAT SERPL-MCNC: 1.3 MG/DL — SIGNIFICANT CHANGE UP (ref 0.5–1.3)
EGFR: 41 ML/MIN/1.73M2 — LOW
EGFR: 41 ML/MIN/1.73M2 — LOW
GLUCOSE SERPL-MCNC: 90 MG/DL — SIGNIFICANT CHANGE UP (ref 70–99)
HCT VFR BLD CALC: 25.9 % — LOW (ref 34.5–45)
HGB BLD-MCNC: 8.5 G/DL — LOW (ref 11.5–15.5)
MCHC RBC-ENTMCNC: 31.8 PG — SIGNIFICANT CHANGE UP (ref 27–34)
MCHC RBC-ENTMCNC: 32.8 G/DL — SIGNIFICANT CHANGE UP (ref 32–36)
MCV RBC AUTO: 97 FL — SIGNIFICANT CHANGE UP (ref 80–100)
NRBC BLD AUTO-RTO: 0 /100 WBCS — SIGNIFICANT CHANGE UP (ref 0–0)
PLATELET # BLD AUTO: 116 K/UL — LOW (ref 150–400)
POTASSIUM SERPL-MCNC: 5.1 MMOL/L — SIGNIFICANT CHANGE UP (ref 3.5–5.3)
POTASSIUM SERPL-SCNC: 5.1 MMOL/L — SIGNIFICANT CHANGE UP (ref 3.5–5.3)
RBC # BLD: 2.67 M/UL — LOW (ref 3.8–5.2)
RBC # FLD: 23.1 % — HIGH (ref 10.3–14.5)
SODIUM SERPL-SCNC: 139 MMOL/L — SIGNIFICANT CHANGE UP (ref 135–145)
WBC # BLD: 4.81 K/UL — SIGNIFICANT CHANGE UP (ref 3.8–10.5)
WBC # FLD AUTO: 4.81 K/UL — SIGNIFICANT CHANGE UP (ref 3.8–10.5)

## 2025-06-03 PROCEDURE — 99233 SBSQ HOSP IP/OBS HIGH 50: CPT

## 2025-06-03 PROCEDURE — 93010 ELECTROCARDIOGRAM REPORT: CPT

## 2025-06-03 PROCEDURE — 99232 SBSQ HOSP IP/OBS MODERATE 35: CPT

## 2025-06-03 RX ORDER — MIDODRINE HYDROCHLORIDE 5 MG/1
10 TABLET ORAL EVERY 8 HOURS
Refills: 0 | Status: DISCONTINUED | OUTPATIENT
Start: 2025-06-03 | End: 2025-06-05

## 2025-06-03 RX ADMIN — APIXABAN 2.5 MILLIGRAM(S): 2.5 TABLET, FILM COATED ORAL at 05:47

## 2025-06-03 RX ADMIN — TIOTROPIUM BROMIDE INHALATION SPRAY 2 PUFF(S): 3.12 SPRAY, METERED RESPIRATORY (INHALATION) at 09:22

## 2025-06-03 RX ADMIN — APIXABAN 2.5 MILLIGRAM(S): 2.5 TABLET, FILM COATED ORAL at 17:49

## 2025-06-03 RX ADMIN — Medication 2.5 MILLIGRAM(S): at 00:44

## 2025-06-03 RX ADMIN — Medication 2.5 MILLIGRAM(S): at 19:13

## 2025-06-03 RX ADMIN — AMIODARONE HYDROCHLORIDE 100 MILLIGRAM(S): 50 INJECTION, SOLUTION INTRAVENOUS at 05:47

## 2025-06-03 RX ADMIN — Medication 20 MILLIGRAM(S): at 05:47

## 2025-06-03 RX ADMIN — GABAPENTIN 400 MILLIGRAM(S): 400 CAPSULE ORAL at 17:48

## 2025-06-03 RX ADMIN — Medication 4 MILLILITER(S): at 19:13

## 2025-06-03 RX ADMIN — LEFLUNOMIDE 10 MILLIGRAM(S): 10 TABLET ORAL at 17:49

## 2025-06-03 RX ADMIN — PREDNISONE 40 MILLIGRAM(S): 20 TABLET ORAL at 05:47

## 2025-06-03 RX ADMIN — Medication 40 MILLIGRAM(S): at 06:03

## 2025-06-03 RX ADMIN — MIDODRINE HYDROCHLORIDE 10 MILLIGRAM(S): 5 TABLET ORAL at 22:40

## 2025-06-03 RX ADMIN — ATORVASTATIN CALCIUM 10 MILLIGRAM(S): 80 TABLET, FILM COATED ORAL at 22:40

## 2025-06-03 RX ADMIN — Medication 2.5 MILLIGRAM(S): at 07:09

## 2025-06-03 RX ADMIN — Medication 1 DOSE(S): at 17:49

## 2025-06-03 RX ADMIN — Medication 4 MILLILITER(S): at 07:09

## 2025-06-03 RX ADMIN — Medication 1 DOSE(S): at 09:21

## 2025-06-03 RX ADMIN — Medication 10 MILLIGRAM(S): at 13:16

## 2025-06-03 RX ADMIN — TIZANIDINE 2 MILLIGRAM(S): 4 TABLET ORAL at 22:40

## 2025-06-03 RX ADMIN — MIDODRINE HYDROCHLORIDE 10 MILLIGRAM(S): 5 TABLET ORAL at 06:03

## 2025-06-03 RX ADMIN — MONTELUKAST SODIUM 10 MILLIGRAM(S): 10 TABLET ORAL at 11:28

## 2025-06-03 RX ADMIN — GABAPENTIN 400 MILLIGRAM(S): 400 CAPSULE ORAL at 05:47

## 2025-06-03 RX ADMIN — IPRATROPIUM BROMIDE AND ALBUTEROL SULFATE 3 MILLILITER(S): .5; 2.5 SOLUTION RESPIRATORY (INHALATION) at 12:35

## 2025-06-03 NOTE — DISCHARGE NOTE PROVIDER - NSDCMRMEDTOKEN_GEN_ALL_CORE_FT
amiodarone 200 mg oral tablet: 0.5 tab(s) orally once a day  Bentyl 10 mg oral capsule: 1 cap(s) orally 2 times a day, As Needed  Eliquis 2.5 mg oral tablet: 1 tab(s) orally 2 times a day  esomeprazole 20 mg oral delayed release capsule: 1 cap(s) orally once a day  folic acid 1 mg oral tablet: 1 tab(s) orally once a day (in the morning)  gabapentin 400 mg oral capsule: 1 cap(s) orally 2 times a day  IVIG monthly:   leflunomide 10 mg oral tablet: 1 tab(s) orally once a day  midodrine 5 mg oral tablet: 2 tab(s) orally in the morning and 1 tab orally in the evening  montelukast 10 mg oral tablet: 1 tab(s) orally once a day  predniSONE 5 mg oral tablet: 1 tab(s) orally once a day  Procrit 10,000 units/mL preservative-free injectable solution: injectable once a week WEDNESDAY  Reclast Infusion , IV x 1 yearly:   simvastatin 10 mg oral tablet: 1 tab(s) orally once a day (at bedtime)  tiZANidine: 2 tab(s) orally once a day (at bedtime) as needed for  muscle spasm  torsemide 20 mg oral tablet: 1 tab(s) orally once a day (in the morning)   amiodarone 200 mg oral tablet: 0.5 tab(s) orally once a day  Bentyl 10 mg oral capsule: 1 cap(s) orally 2 times a day, As Needed  Eliquis 2.5 mg oral tablet: 1 tab(s) orally 2 times a day  esomeprazole 20 mg oral delayed release capsule: 1 cap(s) orally once a day  fluticasone-salmeterol 500 mcg-50 mcg/inh inhalation powder: 1 puff(s) inhaled 2 times a day  folic acid 1 mg oral tablet: 1 tab(s) orally once a day (in the morning)  gabapentin 400 mg oral capsule: 1 cap(s) orally 2 times a day  ipratropium-albuterol 0.5 mg-2.5 mg/3 mL inhalation solution: 3 milliliter(s) inhaled every 6 hours As needed Shortness of Breath and/or Wheezing  IVIG monthly:   leflunomide 10 mg oral tablet: 1 tab(s) orally once a day  midodrine 10 mg oral tablet: 1 tab(s) orally every 8 hours  montelukast 10 mg oral tablet: 1 tab(s) orally once a day  pantoprazole 40 mg oral delayed release tablet: 1 tab(s) orally once a day (before a meal)  predniSONE 5 mg oral tablet: 1 tab(s) orally once a day  Procrit 10,000 units/mL preservative-free injectable solution: injectable once a week WEDNESDAY  Reclast Infusion , IV x 1 yearly:   simvastatin 10 mg oral tablet: 1 tab(s) orally once a day (at bedtime)  tiotropium 2.5 mcg/inh inhalation aerosol: 2 puff(s) inhaled once a day  tiZANidine: 2 tab(s) orally once a day (at bedtime) as needed for  muscle spasm  torsemide 20 mg oral tablet: 1 tab(s) orally once a day (in the morning)

## 2025-06-03 NOTE — PROGRESS NOTE ADULT - ASSESSMENT
REASON FOR VISIT  .. Management of problems listed below      EVENTS/CURRENT ISSUES.  .... 6/2/2025 pt cannot tolerate bpap   .... 5/30/2025 abg shows metabolic alkalosis and resp acidosis noct bpap ordered   .... 5/29/2025 pred changed solumed   .... 5/28/2025 lasix changed to torsemide   .... 5/27/2025 being rxed for copd chf   .... 5/27/2025 85 y/o F with PMH AFon Eliquis, COPD (not on home o2), CKD, aplastic anemia, PMR (chronic prednisone with AVN hip aw sob wheeze         REVIEW OF SYMPTOMS   Able to give ROS  Yes     RELIABILITY +/-   CONSTITUTIONAL Weakness Yes    ENDOCRINE  No heat or cold intolerance    ALLERGY No hives  Sore throat No stridor  RESP Shortness of breath YES   NEURO New weakness No   CARDIAC   Palpitations No         PHYSICAL EXAM    HEENT Unremarkable  atraumatic   RESP Fair air entry  Harsh breath sound   CARDIAC S1 S2 No S3     NO JVD    ABDOMEN No hepatosplenomegaly   PEDAL EDEMA present No calf tenderness  NO rash     REASON FOR VISIT  .. Management of problems listed below      CC.   . 5/26/2025 c/o shortness of breath- was 87% on room air- patient was wheezing- poatient given 1 albuterol treatment by ems- on 4 litres patient saturating at 96%  shortness of breath  OVERALL PRESENTATION.  . 5/26/2025 85 y/o F with PMH AFon Eliquis, COPD (not on home o2), CKD, aplastic anemia, PMR (chronic prednisone with AVN hip), HLD, HTN, DM who p/w worsening SOB over the past two days, denies CP/ palpitations, has home O2 setup (but doesn't use) but put it on after noticing SpO2 in the 80's, which improved SOB. Endorses worsening SWAIN, denies significant wheezing, cough, recent illness med changes, or changes in diet. Adherent with home meds.    Denies fever, chills, chest pain, palpitations, cough, abdominal pain, nausea, vomiting, diarrhea, constipation, urinary frequency, urgency, or dysuria, headaches, changes in vision, dizziness, numbness, tingling.    ED Course:   Vitals: BP: 130/77, HR: 77, Temp: 98, RR: 26, SpO2: 96% on 4L NC  Labs: BNP 2185, trop WNL, WBC: 3.08 (around baseline), Hb 8.9 () (around baseline), HCO3- 32,  RVP negative,     CXR: linear interstitial prominence in b/l bases, trace pleural fluid, potential cardiogenic pulmonary edema, medi-port tip at cavoatrial junction, as per personal read, official read pending   B/L LE Dopplers: No evidence of DVT  EKG: NSR PACs  Received in the ED: lasix 20mg IV, solumedrol 125mg IV, duonebs  PMH.      PAST HOSPITAL STAYS .  Home Medications:   * No Current Medications as of 05-May-2025 09:54 documented in Structured Notes  · folic acid 1 mg oral tablet: 1 tab(s) orally once a day (in the morning)  · midodrine 5 mg oral tablet: 1 tablet orally 3 times a day  · torsemide 20 mg oral tablet: 1 tab(s) orally once a day (in the morning)  · gabapentin 400 mg oral capsule: 1 cap(s) orally 2 times a day  · tiZANidine: 2 tab(s) orally once a day (at bedtime)  · Singulair 10 mg oral tablet: 1 tab(s) orally once a day (in the morning)  · Bentyl 10 mg oral capsule: 1 cap(s) orally 2 times a day, As Needed  · Leflunomide: once a day (after a meal)  · NexIUM 20 mg oral delayed release capsule: 1 cap(s) orally once a day (in the morning)  · simvastatin 10 mg oral tablet: 1 tab(s) orally once a day (at bedtime)  · Reclast Infusion , IV x 1 yearly:   · Vitamin D3 , 1 tablet by mouth 3x a week:   · IVIG monthly:   · amiodarone 100 mg oral tablet: 1 tab(s) orally once a day (in the morning)  · oxyCODONE 5 mg oral tablet: 1 tab(s) orally prn  · Eliquis 2.5 mg oral tablet: 1 tab(s) orally 2 times a day  · Procrit 10,000   ER MGMT .        BEST PRACTICE ISSUES.  . HOB ELEVATN.    .... Yes  . DIET  .   .... DASH 2L FR 5/26  . FREE WATER.   ....   .  IV FLUID.  .....   . PHARMAC DVT PPLX .    .... APIXABA 5/26 a 2.5 x 2 (nv af)   . NON PHARMAC DVT PPLX .      . STRESS ULCR PPLX .   .... PROTONIX 40 5/27/2025  . DATE/DM MGMT.   ..... See under Endocrine section   GENERAL DATA .   . COVID.         .... scv2 5/27/2025 (-)   . GOC.    ....    . ICU STAY.    .... no   . INFECTION PPLX .   ....   . ALLGY.   ....  nka  . WT.   .... 5/27/2025 64  . BMI.  .... 5/27/2025 25       [id number]  VITALS/GAS EXCHANGE/DRIPS    ABGS.   . 5/27 declined ordered abg   . 5/30/2025 8a 3l 744/51/86   .  VS/ PO/IO/ VENT/ DRIPS.  6/3/2025 afeb 60 110/50   6/3/2025  2l 99%     XXXXXXXXXXXXXXXXXXXXXXXXXXXXXXXXXXX  PROBLEM ASSESSMENT RECOMMENDATIONS.  RESP.   . GAS EXCHANGE .   .... target PO 90-95%   .... 5/28/2025 check abg to see if shes retaining co2     . HYPERCAPNIA   .... 5/30/2025 8a 3l 744/51/86   .... 5/30/2025 tco2 34   .... 5/30/2025 abg analysis suggests metabolic alkalosis plus resp acidosis   .... 5/30/2025 as pt co sob will try noct bpap 12/6/16/28     . SOB  .... DD COPD CHF     . RO VTE  .... venous duplx 5/26 (-)    . COPD  .... GIVEN PREDNISONE 50 5/27-5/29/2025 X 5D  .... SOLU   ........ 40.2 5/29/2025   ........ 40 5/31-6/2/2025   .... PRED 40 6/2/2025  .... NACL 3% bid 5/29   .... DUONEB.4 P 5/27/2025  .... ALBUTEROL.4 5/27  .... ADVAIR   ........ A 250 5/27   ........ A 500 6/3/2025   .... MONTELUKAST 10 5/30/2025   .... SPIRIVA 5/27    INFECTION.  . DATA  .... w 5/26-5/27-5/29-5/30 W 3 -  1.7 - 3.2- 2   .... pr 5/27/2025 pr .29   .... cxr 5/26 napd   .... ct ch 5/27/2025 cw 9/2/2023   ........ mild tracheobr secretns   ........ mosaic attenuation   ........ bl nodularity similar   ....... scattered linear and dependent atelectasis   ....... sm bl effsns   ....... mod indeterminate t8 veretebral body fr new   .... ct ch 5/31/2025   ......... incr small pl effs   ........ dilated pulm arteries suggestive of ph   .... rvp 5/26/2025 rvp (-)   .... no obvious active infectn      CARDIAC.  . ORTHOSTATIC HYPOTENSION  .... has v significant orthostatic hypotension   .... MIDODRINE   ........ m 10.2 5/26  ........ m 10.3 6/3/2025     . CAD    .... Trop 5/27/2025 tr 15       . CHF  .... 5/27/2025  1 + p edema  .... pbnp 5/2705/30/2025 pbnp 2185- 2330   .... tte 4/2025 mild ph  n vf   .... lasix   ........ 5/26 l 40.2   ....... 5/28/2025 l dced   .... torsemide 5/28/2025 t 20   .... check tte     . A fib   .... APIXABA 5/26 a 2.5 x 2   .... AMIODARONE 5/27/2025 5/26 A 100    HEMAT.  .... Plt 5/27/2025 plt 170   ....  monitor    . IMMUNOSUPPRESSIVE  .... LEFLUNAMIDE 10 5/27/2025    . ANEMIA  .... Hb 5/27-5/28-5/9-5/30-5/31-6/2/2025   .... HB 10- 8.9- 8.9  - 8.4 - 8.2 - 7.6   .... fa 5/27/2025 fa > 20   .... b12 5/27/2025 b12 1444   .... target hb 7 (+)    . TRANSFUSION  .... 6/2/2025 1 u prbc    . LEUKOPENIA   .... w 5/26-5/27-5/28 W 3 -  1.7 - 2.2    GI.   . DIET .   .... dash 2 liter fr 5/26    RENAL.  . DATA  .... Na 5/27/2025 Na 139   .... CO2 5/27/2025 co2 30   .... monitor    . HYPERKALEMIA   .... K 5/27-5/28/2025 K 5.7 - 4.6     . KIMBERLY ON CKD  .... Cr 5/27-5/28-5/29-5/30-5/31-6/1/2025   .... Cr 1.2 - 1.3 - 1.3 - 1.6 - 1.4 - 1.6    NEURO.  . PAIN  .... TIZANADINE 5/26 T 2 HS       XXXXXXXXXXXXXXXXXXXXXX   SUMMARY BASELINE .     . 85 y/o F with PMH AFon Eliquis, COPD (not on home o2), CKD, aplastic anemia, PMR (chronic prednisone with AVN hip), HLD, HTN, DM pt of Dr Gallegos not on home ox or home cpap used to use [employer] lives with spouse quit 40 y 1/2 ppd  CC.   . 5/26/2025 SHORTNESS OF BREATH   . 5/26/2026 HYPOXEMIA   . 5/26/2025 WHEEZE   MAIN ISSUES.  . HYPOXIA 5/26/2025  . RESP FAILURE   .... 5/30/2025 nict bpap prdrd   .... 6/1/2025 cannot tolerate bpap   . SOB  .... DD COPD CHF   .... venous duplx 5/26 (-)  . COPD ex   . A fib   .... APIXABA 5/26 a 2.5 x 2  . CHF  .... pbnp 5/27/2025 pbnp 2185  . ANEMIA  .... Hb 5/27/2025 HB 10  . TRANSFUSION  .... 6/2/2025 1 u prbc   . LEUKOPENIA   .... w 5/26-5/27 W 3 -  1.7   . KIMBERLY ON CKD  .... Cr 5/27-5/28-5/29-5/30-5/31-6/1/2025   .... Cr 1.2 - 1.3 - 1.3 - 1.6 - 1.4 - 1.6    PMH.   . AFon Eliquis,   . COPD (not on home o2),   . CKD,   . aplastic anemia,   . PMR (chronic prednisone with AVN hip),   . HLD,   . HTN,   . DM    PROCEDURES/DEVICES .      DISCUSSIONS.  .... Discussed with primary care and relevant consultants on an ongoing basis       TIME SPENT.  . Over 36 minutes aggregate care time spent on encounter; activities included   direct patient care, counseling and/or coordinating care reviewing notes, lab data/ imaging , discussion with multidisciplinary team/ patient  /family and explaining in detail risks, benefits, alternatives  of the recommendations     ROBERT HENRIQUEZ 84 5/27/2025 1942

## 2025-06-03 NOTE — DISCHARGE NOTE PROVIDER - NSDCFUSCHEDAPPT_GEN_ALL_CORE_FT
Rashid Stout  [facility]  CARDIOLOGY [address] [facility] [address]  Scheduled Appointment: 07/14/2025    Leia Gunn  [facility]  RHEUM [address] [facility] [address]  Scheduled Appointment: 08/29/2025

## 2025-06-03 NOTE — DISCHARGE NOTE PROVIDER - PROVIDER TOKENS
PROVIDER:[TOKEN:[[id number]],FOLLOWUP:[1 week],ESTABLISHEDPATIENT:[T]],PROVIDER:[TOKEN:[[id number]],FOLLOWUP:[1 week],ESTABLISHEDPATIENT:[T]],PROVIDER:[TOKEN:[[id number]],ESTABLISHEDPATIENT:[T]],PROVIDER:[TOKEN:[[id number]],ESTABLISHEDPATIENT:[T]],PROVIDER:[TOKEN:[[id number]]]

## 2025-06-03 NOTE — DISCHARGE NOTE PROVIDER - NSDCCPCAREPLAN_GEN_ALL_CORE_FT
PRINCIPAL DISCHARGE DIAGNOSIS  Diagnosis: COPD exacerbation  Assessment and Plan of Treatment: WIth acute hypoxic respiratory failure requiring supplemental O2. Treated with nebulizers and steroids. Can resume your regular dose of prednisone for PMR. Continue nebs, advair, spiriva. Follow up      SECONDARY DISCHARGE DIAGNOSES  Diagnosis: Acute on chronic heart failure with preserved ejection fraction (HFpEF)  Assessment and Plan of Treatment: You were treated with IV diuretics. Your echo showed a normal EF and pulmonary HTN. Please continue your home torsemide. Follow up with your cardiologist, Dr. Stout.    Diagnosis: Aplastic anemia  Assessment and Plan of Treatment: Please follow up with Dr. Parker. You had 2 units of blood earlier in the hospitalization. You had a very small streak of blood in the toilet bowl on the day before discharge. Despite this, the blood count remained stable. You were seen by GI - likely hemorrhoids. Folow up with your hematologist and your GI Dr. Carlson.    Diagnosis: Acute kidney injury superimposed on CKD  Assessment and Plan of Treatment: Your kidney function was slightly elevated from baseline. This improved and likely driven by your diuretics. A nephrologist saw you in the hospital and your numbers stabilized. Can follow up routinely for monitoring.     PRINCIPAL DISCHARGE DIAGNOSIS  Diagnosis: COPD exacerbation  Assessment and Plan of Treatment: WIth acute hypoxic respiratory failure requiring supplemental O2. Treated with nebulizers and steroids. Can resume your regular dose of prednisone for PMR. Continue nebs, advair, spiriva. Follow up      SECONDARY DISCHARGE DIAGNOSES  Diagnosis: Acute on chronic heart failure with preserved ejection fraction (HFpEF)  Assessment and Plan of Treatment: You were treated with IV diuretics. Your echo showed a normal EF and pulmonary HTN. Please continue your home torsemide. Follow up with your cardiologist, Dr. Stout.    Diagnosis: Aplastic anemia  Assessment and Plan of Treatment: Please follow up with Dr. Parker. You had 2 units of blood earlier in the hospitalization. You had a very small streak of blood in the toilet bowl on the day before discharge. Despite this, the blood count remained stable. You were seen by GI - likely hemorrhoids. You can still continue your home eliquis. Folow up with your hematologist and your GI Dr. Carlson.    Diagnosis: Acute kidney injury superimposed on CKD  Assessment and Plan of Treatment: Your kidney function was slightly elevated from baseline. This improved and likely driven by your diuretics. A nephrologist saw you in the hospital and your numbers stabilized. Can follow up routinely for monitoring.    Diagnosis: Orthostatic hypotension  Assessment and Plan of Treatment: Your midodrine dose was increased. Follow up with Dr. Stout.

## 2025-06-03 NOTE — PROGRESS NOTE ADULT - ASSESSMENT
84F Hx HTN, HLD, DM, PAF, CKD, aplastic anemia presented w/ worsening SOB and b/l LE edema; admitted for likely HFpEF exacerbation     p/w shortness of breath, multifactorial in nature, seemed mostly likely secondary to a COPD  - chronic lower extremity edema resolved    - S/p Lasix 40mg IV BID, and remains on Torsemide 20 daily    - Repeat TTE normal LV and RV size and function, EF 71%, PASP 54, increased LV mass and hypertrophy   + Anemia,, transfusing per primary     - No evidence of any active ischemia  - Continue statin      - Known Afib   - Continue Eliquis, Amiodarone     - BP soft   - Continue Midodrine.     - Monitor and replete lytes, keep K>4, Mg>2.  - Will continue to follow.

## 2025-06-03 NOTE — PROGRESS NOTE ADULT - SUBJECTIVE AND OBJECTIVE BOX
[facility]                                                       Dr. Chisholm, Dr. Franklin, Dr. Baron, Dr. Clark, Dr. Stock                                      [facility] [place], [address]                                                 [address] [place] [address]                                      Ph: [phone]  Fax: [phone]                                         Ph: [phone]  Fax: [phone]      Patient is a 84y old  Female who presents with a chief complaint of Shortness of breath (02 Jun 2025 14:05)  Patient seen in follow up for CKD 3.        PAST MEDICAL HISTORY:  COPD (chronic obstructive pulmonary disease)    DM (diabetes mellitus)    Pulmonary fibrosis    PAF (paroxysmal atrial fibrillation)    Hiatal hernia    GIB (gastrointestinal bleeding)    Pericarditis    Shoulder fracture, right    Hematoma    H/O aplastic anemia    History of IBS    H/O osteoporosis    HLD (hyperlipidemia)    PMR (polymyalgia rheumatica)    History of basal cell cancer    Chronic kidney disease (CKD)    Hypotension    Presence of IVC filter    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    History of immunodeficiency    Lumbar compression fracture    H/O hiatal hernia    H/O pulmonary fibrosis    H/O sinus bradycardia    History of fracture of right hip      MEDICATIONS  (STANDING):  albuterol    0.083% 2.5 milliGRAM(s) Nebulizer every 6 hours  albuterol    90 MICROgram(s) HFA Inhaler 1 Puff(s) Inhalation every 4 hours  aMIOdarone    Tablet 100 milliGRAM(s) Oral daily  apixaban 2.5 milliGRAM(s) Oral every 12 hours  atorvastatin 10 milliGRAM(s) Oral at bedtime  fluticasone propionate/ salmeterol 250-50 MICROgram(s) Diskus 1 Dose(s) Inhalation two times a day  gabapentin 400 milliGRAM(s) Oral two times a day  leflunomide 10 milliGRAM(s) Oral daily  midodrine. 10 milliGRAM(s) Oral <User Schedule>  montelukast 10 milliGRAM(s) Oral daily  pantoprazole    Tablet 40 milliGRAM(s) Oral before breakfast  predniSONE   Tablet 40 milliGRAM(s) Oral daily  sodium chloride 3%  Inhalation 4 milliLiter(s) Inhalation every 12 hours  tiotropium 2.5 MICROgram(s) Inhaler 2 Puff(s) Inhalation daily  tiZANidine 2 milliGRAM(s) Oral at bedtime  torsemide 20 milliGRAM(s) Oral daily    MEDICATIONS  (PRN):  albuterol/ipratropium for Nebulization 3 milliLiter(s) Nebulizer every 6 hours PRN Shortness of Breath and/or Wheezing  dicyclomine 10 milliGRAM(s) Oral two times a day before meals PRN bowel irritability  melatonin 3 milliGRAM(s) Oral at bedtime PRN Insomnia    T(C): 36.8 (06-03-25 @ 11:45), Max: 37.1 (06-02-25 @ 11:34)  HR: 60 (06-03-25 @ 11:45) (55 - 92)  BP: 112/55 (06-03-25 @ 11:45) (91/55 - 121/67)  RR: 18 (06-03-25 @ 11:45)  SpO2: 99% (06-03-25 @ 11:45)  Wt(kg): --  I&O's Detail    03 Jun 2025 07:01  -  03 Jun 2025 11:51  --------------------------------------------------------  IN:    Oral Fluid: 240 mL  Total IN: 240 mL    OUT:    Voided (mL): 700 mL  Total OUT: 700 mL    Total NET: -460 mL              PHYSICAL EXAM:  General: No distress  Respiratory: b/l air entry  Cardiovascular: S1 S2  Gastrointestinal: soft  Extremities:  edema                 LABORATORY:                        8.5    4.81  )-----------( 116      ( 03 Jun 2025 06:10 )             25.9     06-03    139  |  100  |  68[H]  ----------------------------<  90  5.1   |  33[H]  |  1.30    Ca    9.3      03 Jun 2025 06:10    TPro  5.7[L]  /  Alb  3.6  /  TBili  1.0  /  DBili  x   /  AST  8[L]  /  ALT  26  /  AlkPhos  30[L]  06-02    Sodium: 139 mmol/L (06-03 @ 06:10)  Sodium: 137 mmol/L (06-02 @ 06:05)    Potassium: 5.1 mmol/L (06-03 @ 06:10)  Potassium: 4.4 mmol/L (06-02 @ 06:05)    Hemoglobin: 8.5 g/dL (06-03 @ 06:10)  Hemoglobin: 7.7 g/dL (06-02 @ 09:55)  Hemoglobin: 7.6 g/dL (06-02 @ 06:05)  Hemoglobin: 8.2 g/dL (06-01 @ 07:10)    Creatinine, Serum 1.30 (06-03 @ 06:10)  Creatinine, Serum 1.50 (06-02 @ 06:05)  Creatinine, Serum 1.60 (06-01 @ 07:10)        LIVER FUNCTIONS - ( 02 Jun 2025 06:05 )  Alb: 3.6 g/dL / Pro: 5.7 g/dL / ALK PHOS: 30 U/L / ALT: 26 U/L / AST: 8 U/L / GGT: x           Urinalysis Basic - ( 03 Jun 2025 06:10 )    Color: x / Appearance: x / SG: x / pH: x  Gluc: 90 mg/dL / Ketone: x  / Bili: x / Urobili: x   Blood: x / Protein: x / Nitrite: x   Leuk Esterase: x / RBC: x / WBC x   Sq Epi: x / Non Sq Epi: x / Bacteria: x

## 2025-06-03 NOTE — PROGRESS NOTE ADULT - ASSESSMENT
85 y/o F with PMH AFon Eliquis, COPD, CKD, aplastic anemia, PMR (chronic prednisone with AVN hip), HLD, HTN, DM who p/w worsening SOB over the past few days admitted for likely CHF exacerbation.        Problem/Plan - 1:  ·  Problem: Acute hypoxic respiratory failure.   ·  Plan: Probably secondary to acute on chronic COPD exacerbation aspect with acute on chronic diastolic heart failure exacerbation (since resolved). D-dimer negative, CXR without evidence of airspace consolidation, likely element of interstitial edema, BNP elevated  LE doppler negative for DVT  Recent TTE (4/8/2025): EF 63%, LV hypertrophy, intermediate diastolic dysfunction, mild pHTN. Repeat Echo Pending     CT chest w/o IV contrast- mild B/L pleural effusions- also noted T8 vertebral body fx- patient aware of fracture- states occurred last year.  Strict I/Os, daily weights  Fluid restriction 2L, avoid IVF   PT evaluation- recommend no skilled PT needs  Continuous pulse ox, wean O2 as tolerated to obtain SpO2 88-92% goal.  Patient diuresed well- switched from IV lasix to home torsemide 20mg daily. PRN IV Lasix   For COPD exacerbation continue duonebs q6h prn, Prednisone taper   d/w Cardio, recommended repeat CT chest non contrast since patient still sob, reviewed the result. + for bilat pleural effusions and Pulm hTN.      Problem/Plan - 2:  ·  Problem: Chronic obstructive pulmonary disease (COPD).   ·  Plan: No longer taking trelegy inhaler, stopped 6 mo ago by Dr. Gallegos (pulm)  wean O2 as tolerated to obtain SpO2 88-92% goal.  Nebs PRN/ Prednisone   Chest PT      Problem/Plan - 3:  ·  Problem: Diabetes mellitus.   ·  Plan: Not on home medications  Consistent carb diet  Insulin sliding scale  Continue home gabapentin.     Problem/Plan - 4:  ·  Problem: Atrial fibrillation.   ·  Plan: Continue home eliquis 2.5 mg BID and amiodarone 100mg qd.     Problem/Plan - 5:  ·  Problem: Polymyalgia rheumatica.   ·  Plan: Takes prednisone 5mg daily, chronically, will increase to solumedrol for COPD exacerbation. Return back to home 5mg qd when increased dose no longer clinically warranted or completed course.     Continue home leflunomide (10mg), pharmacy only carries 20mg and cannot split tabs. Patient brought home medication- verified by pharmacy.     Problem/Plan - 6:  ·  Problem: Aplastic anemia.   ·  Plan: Chronic aplastic anemia, baseline Hb, gets frequent infusions and transfusions. At high risk of fluid overload due to CHF. S/p 1 unit PRBC   No signs of bleeding  Heme/Onc following      Problem/Plan - 7:  ·  Problem: Hypotension.   ·  Plan: Continue home midodrine 10mg BID with hold parameters.     Problem/Plan - 8:  ·  Problem: HLD (hyperlipidemia).   ·  Plan: Continue home simvastatin 10mg qd as atorvastatin 10mg.     Problem/Plan - 9:  ·  Problem: Need for prophylactic measure.   ·  Plan: DVT: c/w home eliquis 2.5mg BID. 83 y/o F with PMH AFon Eliquis, COPD, CKD, aplastic anemia, PMR (chronic prednisone with AVN hip), HLD, HTN, DM who p/w worsening SOB over the past few days admitted for acute hypoxic respiratory failure secondary to acute CHF and COPD exacerbation.    Problem/Plan - 1  -Acute hypoxic respiratory failure secondary to acute CHF and COPD exacerbation.  - Treated with IV Lasix, Solumedrol,  Nebs PRN, O2 via NC   -Recent TTE (4/8/2025): EF 63%, LV hypertrophy, intermediate diastolic dysfunction, mild pHTN. Repeat TTE noted for :  Left ventricular systolic function is normal with a calculated ejection fraction of 71 % by the Arias's biplane method of disks. Moderate left ventricular hypertrophy. There is increased LV mass and concentric hypertrophy.  - CT chest w/o IV contrast- mild B/L pleural effusions- also noted T8 vertebral body fx- patient aware of fracture- states occurred last year.  - Fluid restriction 2L  - O2 via NC 2-3L. Persistent sob upon ambulation   - Treated with IV Lasix. Patient diuresed well- switched from IV lasix to home torsemide 20mg daily.   -On Midodrine for Orthostatic hypotension   - For COPD exacerbation continue duonebs q6h prn, Prednisone taper x 5 days per Pulm.  - Repeat CT chest non contrast since patient still sob, reviewed the result. + for bilat pleural effusions and Pulm hTN.   - Possible plan for discharge to [facility] when Symptoms better and not  Orthostatic. Midodrine dose increased. PT re evaluation       Problem/Plan - 2  ·  Problem: Diabetes mellitus.   ·  Plan: Not on home medications  Consistent carb diet  Insulin sliding scale  Continue home gabapentin.     Problem/Plan - 3  ·  Problem: Atrial fibrillation.   ·  Plan: Continue home eliquis 2.5 mg BID and amiodarone 100mg qd.     Problem/Plan - 4  ·  Problem: Polymyalgia rheumatica.   ·  Plan: Takes prednisone 5mg daily, chronically, will increase to solumedrol for COPD exacerbation. Return back to home 5mg qd when increased dose no longer clinically warranted or completed course.     Continue home leflunomide (10mg), pharmacy only carries 20mg and cannot split tabs. Patient brought home medication- verified by pharmacy.     Problem/Plan - 5  ·  Problem: Aplastic anemia.   ·  Plan: Chronic aplastic anemia, baseline Hb, gets frequent infusions and transfusions. At high risk of fluid overload due to CHF. S/p 1 unit PRBC   No signs of bleeding  Heme/Onc following      Problem/Plan - 7:  ·  Problem: Orthostatic Hypotension.   ·  Plan: Increased dose of Midodrine to 10mg TID      Problem/Plan - 8:  ·  Problem: HLD (hyperlipidemia).   ·  Plan: Continue home simvastatin 10mg qd as atorvastatin 10mg.     Problem/Plan - 9:  ·  Problem: Need for prophylactic measure.   ·  Plan: DVT: c/w home eliquis 2.5mg BID.

## 2025-06-03 NOTE — PROGRESS NOTE ADULT - SUBJECTIVE AND OBJECTIVE BOX
CHIEF COMPLAINT/ REASON FOR VISIT  .. Patient was seen to address the  issue listed under PROBLEM LIST which is located toward bottom of this note     MARTINEZ PATEL 2EAS 201 D1    Allergies    No Known Allergies    Intolerances        PAST MEDICAL & SURGICAL HISTORY:  COPD (chronic obstructive pulmonary disease)      DM (diabetes mellitus)  diet-controlled      PAF (paroxysmal atrial fibrillation)  s/p ablation      Hiatal hernia      H/O aplastic anemia      History of IBS      H/O osteoporosis      HLD (hyperlipidemia)      PMR (polymyalgia rheumatica)      History of basal cell cancer      Chronic kidney disease (CKD)      Hypotension      Presence of IVC filter      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      History of immunodeficiency      Lumbar compression fracture      H/O hiatal hernia      H/O pulmonary fibrosis      H/O sinus bradycardia      History of fracture of right hip  "unoperable"      S/P hysterectomy      S/P foot surgery      S/P knee surgery      After cataract  bilateral eye cataract surgically removed      Closed right hip fracture  rigth hip ORIF july 19 2016      S/P IVC filter  nov 2016      S/P carpal tunnel release  Right 2008 & 6/27/17      H/O kyphoplasty      Port-A-Cath in place  right chest          FAMILY HISTORY:  Family history of bladder cancer (Mother)    Family history of myocardial infarction (Father)    FH: atrial fibrillation (Father)        Home Medications:  amiodarone 200 mg oral tablet: 0.5 tab(s) orally once a day (27 May 2025 13:21)  Bentyl 10 mg oral capsule: 1 cap(s) orally 2 times a day, As Needed (27 May 2025 13:22)  Eliquis 2.5 mg oral tablet: 1 tab(s) orally 2 times a day (27 May 2025 12:39)  esomeprazole 20 mg oral delayed release capsule: 1 cap(s) orally once a day (27 May 2025 13:23)  folic acid 1 mg oral tablet: 1 tab(s) orally once a day (in the morning) (27 May 2025 12:39)  gabapentin 400 mg oral capsule: 1 cap(s) orally 2 times a day (27 May 2025 12:39)  IVIG monthly:  (27 May 2025 12:39)  leflunomide 10 mg oral tablet: 1 tab(s) orally once a day (27 May 2025 13:23)  midodrine 5 mg oral tablet: 2 tab(s) orally in the morning and 1 tab orally in the evening (27 May 2025 13:23)  montelukast 10 mg oral tablet: 1 tab(s) orally once a day (27 May 2025 13:23)  predniSONE 5 mg oral tablet: 1 tab(s) orally once a day (27 May 2025 13:22)  Procrit 10,000 units/mL preservative-free injectable solution: injectable once a week WEDNESDAY (27 May 2025 12:39)  Reclast Infusion , IV x 1 yearly:  (27 May 2025 13:22)  simvastatin 10 mg oral tablet: 1 tab(s) orally once a day (at bedtime) (27 May 2025 12:39)  tiZANidine: 2 tab(s) orally once a day (at bedtime) as needed for  muscle spasm (27 May 2025 13:22)  torsemide 20 mg oral tablet: 1 tab(s) orally once a day (in the morning) (27 May 2025 12:39)      MEDICATIONS  (STANDING):  albuterol    0.083% 2.5 milliGRAM(s) Nebulizer every 6 hours  albuterol    90 MICROgram(s) HFA Inhaler 1 Puff(s) Inhalation every 4 hours  aMIOdarone    Tablet 100 milliGRAM(s) Oral daily  apixaban 2.5 milliGRAM(s) Oral every 12 hours  atorvastatin 10 milliGRAM(s) Oral at bedtime  fluticasone propionate/ salmeterol 250-50 MICROgram(s) Diskus 1 Dose(s) Inhalation two times a day  gabapentin 400 milliGRAM(s) Oral two times a day  leflunomide 10 milliGRAM(s) Oral daily  midodrine. 10 milliGRAM(s) Oral <User Schedule>  montelukast 10 milliGRAM(s) Oral daily  pantoprazole    Tablet 40 milliGRAM(s) Oral before breakfast  predniSONE   Tablet 40 milliGRAM(s) Oral daily  sodium chloride 3%  Inhalation 4 milliLiter(s) Inhalation every 12 hours  tiotropium 2.5 MICROgram(s) Inhaler 2 Puff(s) Inhalation daily  tiZANidine 2 milliGRAM(s) Oral at bedtime  torsemide 20 milliGRAM(s) Oral daily    MEDICATIONS  (PRN):  albuterol/ipratropium for Nebulization 3 milliLiter(s) Nebulizer every 6 hours PRN Shortness of Breath and/or Wheezing  dicyclomine 10 milliGRAM(s) Oral two times a day before meals PRN bowel irritability  melatonin 3 milliGRAM(s) Oral at bedtime PRN Insomnia      Diet, Regular:   DASH/TLC Sodium & Cholesterol Restricted  2000mL Fluid Restriction ([id number]) (05-26-25 @ 23:27) [Active]          Vital Signs Last 24 Hrs  T(C): 36.7 (03 Jun 2025 08:00), Max: 37.1 (02 Jun 2025 11:34)  T(F): 98 (03 Jun 2025 08:00), Max: 98.8 (02 Jun 2025 11:34)  HR: 55 (03 Jun 2025 08:00) (55 - 92)  BP: 104/53 (03 Jun 2025 08:00) (100/43 - 121/67)  BP(mean): --  RR: 18 (03 Jun 2025 08:00) (18 - 18)  SpO2: 99% (03 Jun 2025 08:00) (98% - 100%)    Parameters below as of 03 Jun 2025 08:00  Patient On (Oxygen Delivery Method): nasal cannula  O2 Flow (L/min): 2        06-03-25 @ 07:01  -  06-03-25 @ 08:51  --------------------------------------------------------  IN: 240 mL / OUT: 700 mL / NET: -460 mL              LABS:                        8.5    4.81  )-----------( x        ( 03 Jun 2025 06:10 )             25.9     06-02    137  |  97  |  71[H]  ----------------------------<  94  4.4   |  33[H]  |  1.50[H]    Ca    9.3      02 Jun 2025 06:05    TPro  5.7[L]  /  Alb  3.6  /  TBili  1.0  /  DBili  x   /  AST  8[L]  /  ALT  26  /  AlkPhos  30[L]  06-02      Urinalysis Basic - ( 02 Jun 2025 06:05 )    Color: x / Appearance: x / SG: x / pH: x  Gluc: 94 mg/dL / Ketone: x  / Bili: x / Urobili: x   Blood: x / Protein: x / Nitrite: x   Leuk Esterase: x / RBC: x / WBC x   Sq Epi: x / Non Sq Epi: x / Bacteria: x            WBC:  WBC Count: 4.81 K/uL (06-03 @ 06:10)  WBC Count: 6.01 K/uL (06-02 @ 06:05)  WBC Count: 3.12 K/uL (06-01 @ 07:10)  WBC Count: 2.90 K/uL (05-31 @ 06:50)      MICROBIOLOGY:  RECENT CULTURES:                  Sodium:  Sodium: 137 mmol/L (06-02 @ 06:05)  Sodium: 136 mmol/L (06-01 @ 07:10)  Sodium: 139 mmol/L (05-31 @ 06:50)      1.50 mg/dL 06-02 @ 06:05  1.60 mg/dL 06-01 @ 07:10  1.40 mg/dL 05-31 @ 06:50      Hemoglobin:  Hemoglobin: 8.5 g/dL (06-03 @ 06:10)  Hemoglobin: 7.7 g/dL (06-02 @ 09:55)  Hemoglobin: 7.6 g/dL (06-02 @ 06:05)  Hemoglobin: 8.2 g/dL (06-01 @ 07:10)  Hemoglobin: 8.2 g/dL (05-31 @ 06:50)      Platelets: Platelet Count - Automated: 136 K/uL (06-02 @ 06:05)  Platelet Count - Automated: 155 K/uL (06-01 @ 07:10)  Platelet Count - Automated: 156 K/uL (05-31 @ 06:50)      LIVER FUNCTIONS - ( 02 Jun 2025 06:05 )  Alb: 3.6 g/dL / Pro: 5.7 g/dL / ALK PHOS: 30 U/L / ALT: 26 U/L / AST: 8 U/L / GGT: x             Urinalysis Basic - ( 02 Jun 2025 06:05 )    Color: x / Appearance: x / SG: x / pH: x  Gluc: 94 mg/dL / Ketone: x  / Bili: x / Urobili: x   Blood: x / Protein: x / Nitrite: x   Leuk Esterase: x / RBC: x / WBC x   Sq Epi: x / Non Sq Epi: x / Bacteria: x        RADIOLOGY & ADDITIONAL STUDIES:      MICROBIOLOGY:  RECENT CULTURES:

## 2025-06-03 NOTE — DISCHARGE NOTE PROVIDER - CARE PROVIDER_API CALL
Rashid Stout  Cardiology  [address] [place] [address]  Phone: [phone]  Fax: [phone]  Established Patient  Follow Up Time: 1 week    Renny Gallegos  Pulmonary Disease  [address] [place] [address]  Phone: [phone]  Fax: [phone]  Established Patient  Follow Up Time: 1 week    Severiano Rodas  Internal Medicine  [address] [place] [address]  Phone: [phone]  Fax: [phone]  Established Patient  Follow Up Time:     Berhane Carlson  Gastroenterology  [address] [place] [address]  Phone: [phone]  Fax: [phone]  Established Patient  Follow Up Time:     Henrique Chisholm  Nephrology  [address] [facility], [address] [place] [address]  Phone: [phone]  Fax: [phone]  Follow Up Time:

## 2025-06-03 NOTE — SOCIAL WORK PROGRESS NOTE - NSSWPROGRESSNOTE_GEN_ALL_CORE
Patient agreeable to subacute rehab. She has provided her first choice. Educated her on discharge process and policy. Provided her with a list of rehabs and requested several in preference order. Requested [facility]

## 2025-06-03 NOTE — PROGRESS NOTE ADULT - ASSESSMENT
Prerenal azotemia, CKD 3a  Dyspnea: COPD/CHF  Diabetes  Hypertension  Aplastic anemia, requiring frequent blood transfusions    06/02/25: Stable renal indices. Mild increase in creatinine trend with diuresis. Off lasix now. Will get kidney and bladder sonogram.   Monitor blood sugar levels. Insulin coverage as needed. Trend BP and titrate BP meds as needed. Monitor h/h trend. Transfuse PRBC's PRN.   Avoid nephrotoxic meds as possible. Will follow electrolytes and renal function trend. D/w pt's daughter at bedside.   D/w patient regarding need for out patient nephrology follow up.   06/03/25: Stable renal indices. To continue current meds. s/p PRBC transfusion. Encourage PO intake as tolerated.

## 2025-06-03 NOTE — DISCHARGE NOTE PROVIDER - CARE PROVIDERS DIRECT ADDRESSES
[email or web address]

## 2025-06-03 NOTE — PROGRESS NOTE ADULT - ASSESSMENT
83 yo woman follows in our office for chronic anemia due to Myelodysplasia, transfusion and on Procrit, adm w incr SOB and legs edema, COPD/CHF exacerbation    -CBC w decrease since adm, likely reflect exacerbation of acute illness  -post tx PRBC, Hgb incr to 8.6 today  -continue present acute care  -symptomatic management from Heme standpoint  -

## 2025-06-03 NOTE — DISCHARGE NOTE PROVIDER - ATTENDING DISCHARGE PHYSICAL EXAMINATION:
T(C): 37.1 (06-05-25 @ 10:53), Max: 37.1 (06-05-25 @ 08:45)  HR: 58 (06-05-25 @ 10:53) (56 - 72)  BP: 103/65 (06-05-25 @ 10:53) (103/65 - 111/57)  RR: 18 (06-05-25 @ 10:53) (18 - 18)  SpO2: 100% (06-05-25 @ 10:53) (97% - 100%)    General: No apparent distress, on 2L O2 via NC  Head: normocephalic, atraumatic  Eyes: EOMI, anicteric  ENT: moist mucous membranes, no pharyngeal exudates  Heart: rrr, S1, S2  Chest: equal air entry bilaterally, decreased bibasilar breath sounds  Abd: BS+, soft, NT, ND  Back: no tenderness  Extr: no edema or cyanosis  Skin: warm, well perfused  Neuro: AA&Ox3, no focal weakness, sensation to light touch intact  Psych: normal affect

## 2025-06-03 NOTE — DISCHARGE NOTE PROVIDER - DISCHARGE SERVICE FOR PATIENT
on the discharge service for the patient. I have reviewed and made amendments to the documentation where necessary.
bilat urolithiasis  Left atrophic kidney  hydronephrosis  likely acute on chronic renal insuffiency  leukocytosis  UTI    Admit Urology  IVf, npo IV Abx  9given in ER  urine culture  plan for OR bilateral retrograde pyelogram, bilateral ureteral stent placements

## 2025-06-03 NOTE — PROGRESS NOTE ADULT - SUBJECTIVE AND OBJECTIVE BOX
All interim records and events noted.    did well w tx PRBC yesterday  still SOB, but legs no longer edematous since admission      MEDICATIONS  (STANDING):  albuterol    0.083% 2.5 milliGRAM(s) Nebulizer every 6 hours  albuterol    90 MICROgram(s) HFA Inhaler 1 Puff(s) Inhalation every 4 hours  aMIOdarone    Tablet 100 milliGRAM(s) Oral daily  apixaban 2.5 milliGRAM(s) Oral every 12 hours  atorvastatin 10 milliGRAM(s) Oral at bedtime  fluticasone propionate/ salmeterol 250-50 MICROgram(s) Diskus 1 Dose(s) Inhalation two times a day  gabapentin 400 milliGRAM(s) Oral two times a day  leflunomide 10 milliGRAM(s) Oral daily  midodrine. 10 milliGRAM(s) Oral <User Schedule>  montelukast 10 milliGRAM(s) Oral daily  pantoprazole    Tablet 40 milliGRAM(s) Oral before breakfast  predniSONE   Tablet 40 milliGRAM(s) Oral daily  sodium chloride 3%  Inhalation 4 milliLiter(s) Inhalation every 12 hours  tiotropium 2.5 MICROgram(s) Inhaler 2 Puff(s) Inhalation daily  tiZANidine 2 milliGRAM(s) Oral at bedtime  torsemide 20 milliGRAM(s) Oral daily    MEDICATIONS  (PRN):  albuterol/ipratropium for Nebulization 3 milliLiter(s) Nebulizer every 6 hours PRN Shortness of Breath and/or Wheezing  dicyclomine 10 milliGRAM(s) Oral two times a day before meals PRN bowel irritability  melatonin 3 milliGRAM(s) Oral at bedtime PRN Insomnia      Vital Signs Last 24 Hrs  T(C): 36.8 (03 Jun 2025 11:45), Max: 37.1 (02 Jun 2025 20:05)  T(F): 98.2 (03 Jun 2025 11:45), Max: 98.8 (02 Jun 2025 20:05)  HR: 70 (03 Jun 2025 12:38) (55 - 92)  BP: 112/55 (03 Jun 2025 11:45) (104/53 - 121/67)  BP(mean): --  RR: 18 (03 Jun 2025 11:45) (18 - 18)  SpO2: 97% (03 Jun 2025 12:38) (97% - 100%)    Parameters below as of 03 Jun 2025 12:38  Patient On (Oxygen Delivery Method): nasal cannula w/ humidification        PHYSICAL EXAM  General: in no acute distress  Head: atraumatic, normocephalic  ENT: sclera anicteric, buccal mucosa moist  Neck: supple, trachea midline  CV: S1 S2, regular rate and rhythm  Lungs: clear to auscultation, no wheezes/rhonchi  Abdomen: soft, nontender, bowel sounds present, no palpable masses  Extrem: no clubbing/cyanosis/edema  Skin: no significant increased ecchymosis/petechiae  Neuro: alert and oriented X3,  no focal deficits      LABS:             8.5    4.81  )-----------( 116      ( 06-03 @ 06:10 )             25.9                7.7    x     )-----------( x        ( 06-02 @ 09:55 )             24.2                7.6    6.01  )-----------( 136      ( 06-02 @ 06:05 )             22.7                8.2    3.12  )-----------( 155      ( 06-01 @ 07:10 )             25.5       06-03    139  |  100  |  68[H]  ----------------------------<  90  5.1   |  33[H]  |  1.30    Ca    9.3      03 Jun 2025 06:10    TPro  5.7[L]  /  Alb  3.6  /  TBili  1.0  /  DBili  x   /  AST  8[L]  /  ALT  26  /  AlkPhos  30[L]  06-02        RADIOLOGY & ADDITIONAL STUDIES:    IMPRESSION/RECOMMENDATIONS:

## 2025-06-03 NOTE — DISCHARGE NOTE PROVIDER - HOSPITAL COURSE
85 y/o F with PMH AFon Eliquis, COPD, CKD, aplastic anemia, PMR (chronic prednisone with AVN hip), HLD, HTN, DM who p/w worsening SOB over the past few days admitted for likely CHF exacerbation.        Problem/Plan - 1:  ·  Problem: Acute hypoxic respiratory failure.   ·  Plan: Probably secondary to acute on chronic COPD exacerbation aspect with acute on chronic diastolic heart failure exacerbation (since resolved). D-dimer negative, CXR without evidence of airspace consolidation, likely element of interstitial edema, BNP elevated  LE doppler negative for DVT  Recent TTE (4/8/2025): EF 63%, LV hypertrophy, intermediate diastolic dysfunction, mild pHTN. Repeat Echo Pending     CT chest w/o IV contrast- mild B/L pleural effusions- also noted T8 vertebral body fx- patient aware of fracture- states occurred last year.  Strict I/Os, daily weights  Fluid restriction 2L, avoid IVF   PT evaluation- recommend no skilled PT needs  Continuous pulse ox, wean O2 as tolerated to obtain SpO2 88-92% goal.  Patient diuresed well- switched from IV lasix to home torsemide 20mg daily. PRN IV Lasix   For COPD exacerbation continue duonebs q6h prn, Prednisone taper   d/w Cardio, recommended repeat CT chest non contrast since patient still sob, reviewed the result. + for bilat pleural effusions and Pulm hTN.      Problem/Plan - 2:  ·  Problem: Chronic obstructive pulmonary disease (COPD).   ·  Plan: No longer taking trelegy inhaler, stopped 6 mo ago by Dr. Gallegos (pulm)  wean O2 as tolerated to obtain SpO2 88-92% goal.  Nebs PRN/ Prednisone   Chest PT    83 y/o F with PMH AFon Eliquis, COPD, CKD, aplastic anemia, PMR (chronic prednisone with AVN hip), HLD, HTN, DM who p/w worsening SOB over the past few days admitted for acute hypoxic respiratory failure secondary to acute CHF and COPD exacerbation.    - Treated with IV Lasix, Solumedrol,  Nebs PRN, O2 via NC   -Recent TTE (4/8/2025): EF 63%, LV hypertrophy, intermediate diastolic dysfunction, mild pHTN. Repeat TTE noted for :  Left ventricular systolic function is normal with a calculated ejection fraction of 71 % by the Arias's biplane method of disks. Moderate left ventricular hypertrophy. There is increased LV mass and concentric hypertrophy.  - CT chest w/o IV contrast- mild B/L pleural effusions- also noted T8 vertebral body fx- patient aware of fracture- states occurred last year.  - Fluid restriction 2L  - O2 via NC 2-3L. Persistent sob upon ambulation   - Treated with IV Lasix. Patient diuresed well- switched from IV lasix to home torsemide 20mg daily.   -On Midodrine for Orthostatic hypotension   - For COPD exacerbation continue duonebs q6h prn, Prednisone taper x 5 days per Pulm.  - Repeat CT chest non contrast since patient still sob, reviewed the result. + for bilat pleural effusions and Pulm hTN.   - Possible plan for discharge to [facility] when Symptoms better and not  Orthostatic 83 y/o F with PMH AFon Eliquis, COPD, CKD, aplastic anemia, PMR (chronic prednisone with AVN hip), HLD, HTN, DM who p/w worsening SOB over the past few days admitted for acute hypoxic respiratory failure secondary to acute diastolic CHF and COPD exacerbation.    -Treated with IV Lasix, Solumedrol,  Nebs PRN, O2 via NC   -Recent TTE (4/8/2025): EF 63%, LV hypertrophy, intermediate diastolic dysfunction, mild pHTN. Repeat TTE noted for :  Left ventricular systolic function is normal with a calculated ejection fraction of 71 % by the Arias's biplane method of disks. Moderate left ventricular hypertrophy. There is increased LV mass and concentric hypertrophy.  - CT chest w/o IV contrast- mild B/L pleural effusions- also noted T8 vertebral body fx- patient aware of fracture- states occurred last year.  - Fluid restriction 2L  - O2 via NC 2-3L. Persistent sob upon ambulation   - Treated with IV Lasix. Patient diuresed well- switched from IV lasix to home torsemide 20mg daily. Given an additional dose of IV lasix prior to discharge.  - On Midodrine for Orthostatic hypotension   - For COPD exacerbation continue duonebs q6h prn, Prednisone taper x 5 days per Pulm.  - Repeat CT chest non contrast since patient still sob, reviewed the result. + for bilat pleural effusions and Pulm HTN.   - CXR done prior to discharge unchanged. Recommended to start incentive spirometry. O2 negative

## 2025-06-03 NOTE — PROGRESS NOTE ADULT - SUBJECTIVE AND OBJECTIVE BOX
[facility] -- Sai Valle Pannella, Patel, Savella Goodger, Cohen  Office # [phone]      Follow Up:    SOB Anemia     Subjective/Observations:     No events overnight resting comfortably in bed.  No complaints of chest pain, dyspnea, or palpitations reported. No signs of orthopnea or PND.  remains on NC     REVIEW OF SYSTEMS: All other review of systems is negative unless indicated above    PAST MEDICAL & SURGICAL HISTORY:  COPD (chronic obstructive pulmonary disease)      DM (diabetes mellitus)  diet-controlled      PAF (paroxysmal atrial fibrillation)  s/p ablation      Hiatal hernia      H/O aplastic anemia      History of IBS      H/O osteoporosis      HLD (hyperlipidemia)      PMR (polymyalgia rheumatica)      History of basal cell cancer      Chronic kidney disease (CKD)      Hypotension      Presence of IVC filter      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      History of immunodeficiency      Lumbar compression fracture      H/O hiatal hernia      H/O pulmonary fibrosis      H/O sinus bradycardia      History of fracture of right hip  "unoperable"      S/P hysterectomy      S/P foot surgery      S/P knee surgery      After cataract  bilateral eye cataract surgically removed      Closed right hip fracture  rigth hip ORIF 2016      S/P IVC filter  2016      S/P carpal tunnel release  Right  & 17      H/O kyphoplasty      Port-A-Cath in place  right chest          MEDICATIONS  (STANDING):  albuterol    0.083% 2.5 milliGRAM(s) Nebulizer every 6 hours  albuterol    90 MICROgram(s) HFA Inhaler 1 Puff(s) Inhalation every 4 hours  aMIOdarone    Tablet 100 milliGRAM(s) Oral daily  apixaban 2.5 milliGRAM(s) Oral every 12 hours  atorvastatin 10 milliGRAM(s) Oral at bedtime  fluticasone propionate/ salmeterol 250-50 MICROgram(s) Diskus 1 Dose(s) Inhalation two times a day  gabapentin 400 milliGRAM(s) Oral two times a day  leflunomide 10 milliGRAM(s) Oral daily  midodrine. 10 milliGRAM(s) Oral <User Schedule>  montelukast 10 milliGRAM(s) Oral daily  pantoprazole    Tablet 40 milliGRAM(s) Oral before breakfast  predniSONE   Tablet 40 milliGRAM(s) Oral daily  sodium chloride 3%  Inhalation 4 milliLiter(s) Inhalation every 12 hours  tiotropium 2.5 MICROgram(s) Inhaler 2 Puff(s) Inhalation daily  tiZANidine 2 milliGRAM(s) Oral at bedtime  torsemide 20 milliGRAM(s) Oral daily    MEDICATIONS  (PRN):  albuterol/ipratropium for Nebulization 3 milliLiter(s) Nebulizer every 6 hours PRN Shortness of Breath and/or Wheezing  dicyclomine 10 milliGRAM(s) Oral two times a day before meals PRN bowel irritability  melatonin 3 milliGRAM(s) Oral at bedtime PRN Insomnia      Allergies    No Known Allergies    Intolerances        Vital Signs Last 24 Hrs  T(C): 36.7 (2025 08:00), Max: 37.1 (2025 11:34)  T(F): 98 (2025 08:00), Max: 98.8 (2025 11:34)  HR: 55 (2025 08:00) (55 - 92)  BP: 104/53 (2025 08:00) (100/43 - 121/67)  BP(mean): --  RR: 18 (2025 08:00) (18 - 18)  SpO2: 99% (2025 08:00) (98% - 100%)    Parameters below as of 2025 08:00  Patient On (Oxygen Delivery Method): nasal cannula  O2 Flow (L/min): 2      I&O's Summary    2025 07:01  -  2025 10:07  --------------------------------------------------------  IN: 240 mL / OUT: 700 mL / NET: -460 mL          PHYSICAL EXAM:  Constitutional: NAD, awake and alert, well-developed  HEENT: Moist Mucous Membranes, Anicteric  Pulmonary: Non-labored, breath sounds are clear bilaterally, No wheezing, crackles or rhonchi  Cardiovascular: Regular, S1 and S2 nl, No murmurs, rubs, gallops or clicks  Gastrointestinal: Bowel Sounds present, soft, nontender.   Lymph: No lymphadenopathy. No peripheral edema.  Skin: No visible rashes or ulcers.  Psych:  Mood & affect appropriate    LABS: All Labs Reviewed:                        8.5    4.81  )-----------( x        ( 2025 06:10 )             25.9                         7.7    x     )-----------( x        ( 2025 09:55 )             24.2                         7.6    6.01  )-----------( 136      ( 2025 06:05 )             22.7     2025 06:10    139    |  100    |  68     ----------------------------<  90     5.1     |  33     |  1.30   2025 06:05    137    |  97     |  71     ----------------------------<  94     4.4     |  33     |  1.50   2025 07:10    136    |  96     |  67     ----------------------------<  127    5.1     |  34     |  1.60     Ca    9.3        2025 06:10  Ca    9.3        2025 06:05  Ca    9.9        2025 07:10    TPro  5.7    /  Alb  3.6    /  TBili  1.0    /  DBili  x      /  AST  8      /  ALT  26     /  AlkPhos  30     2025 06:05             EC Lead ECG:   Ventricular Rate 72 BPM    Atrial Rate 62 BPM    P-R Interval 132 ms    QRS Duration 80 ms    Q-T Interval 418 ms    QTC Calculation(Bazett) 457 ms    P Axis 83 degrees    R Axis -63 degrees    T Axis 63 degrees    Diagnosis Line Sinus rhythm with premature atrial complexes  Pulmonary disease pattern  Left anterior fascicular block  Abnormal ECG  When compared with ECG of 02-SEP-2023 11:31,  premature atrial complexes are now present  Confirmed by ROSALVA GOODRICH ([id number]) on 2025 6:48:02 PM (25 @ 17:20)      TRANSTHORACIC ECHOCARDIOGRAM REPORT  ________________________________________________________________________________                                      _______       Pt. Name:       MARTINEZ JONES Study Date:    2025  MRN:            [id number]        YOB: 1940  Accession #:    [id number]       Age:           84 years  Account#:       [id number]      Gender:        F  Heart Rate:                     Height:        62.99 in (160.00 cm)  Rhythm:                         Weight:        154.32 lb (70.00 kg)  Blood Pressure: 91/55 mmHg      BSA/BMI:       1.73 m² / 27.34 kg/m²  ________________________________________________________________________________________  Referring Physician:    [id number] James Osei  Interpreting Physician: Daniel Jorge M.D.  Primary Sonographer:    Kelsey Monson STAR    CPT:               ECHO TTE WO CON COMP W DOPP - 84442.m  Indication(s):     Cardiomyopathy, unspecified - I42.9  Procedure:         Transthoracic echocardiogram with 2-D, M-mode and complete                     spectral and color flow Doppler.  Ordering Location: [facility]  Admission Status:  Inpatient    _______________________________________________________________________________________     CONCLUSIONS:      1. Leftventricular systolic function is normal with an ejection fraction of 71 % by Arias's method of disks.   2. Normal right ventricular cavity size, with normal wall thickness, and normal right ventricular systolic function.   3. Left atrium is moderately dilated.   4. Mild mitral regurgitation.   5. Estimated pulmonary artery systolic pressure is 54 mmHg.   6. Mild pulmonic regurgitation.   7. Mild tricuspid regurgitation.   8. Small bilateral pleural effusion noted.   9. There is increased LV mass and concentric hypertrophy.    ________________________________________________________________________________________  FINDINGS:     Left Ventricle:  Left ventricular systolic function is normal with a calculated ejection fraction of 71 % by the Arias's biplane method of disks. Moderate left ventricular hypertrophy. There is increased LV mass and concentric hypertrophy.     Right Ventricle:  The right ventricular cavity is normal in size, with normal wall thickness and right ventricular systolic function is normal.     Left Atrium:  The left atrium is moderately dilated with an indexed volume of 44.98 ml/m².     Right Atrium:  The right atrium is normal in size with an indexed volume of 21.88 ml/m² and an indexed area of 8.72 cm²/m².     Aortic Valve:  The aortic valve is tricuspid with normal leaflet excursion. There is mild thickening of the aortic valve leaflets.     Mitral Valve:  Structurally normal mitral valve with normal leaflet excursion. There is calcification of the mitralvalve annulus. There is mild leaflet calcification. There is mild mitral regurgitation.     Tricuspid Valve:  The tricuspid valve is structurally normal with normal leaflet excursion. There is mild tricuspid regurgitation. Estimated pulmonary artery systolic pressure is 54 mmHg.     Pulmonic Valve:  Structurally normal pulmonic valve with normal leaflet excursion. There is mild pulmonic regurgitation.     Aorta:  The aortic root at the sinuses of Valsalva is normal in size, measuring 3.00 cm (indexed 1.73 cm/m²). The aortic arch diameter is normal in size, measuring 2.4 cm (indexed 1.39 cm/m²).     Pericardium:  There is a trace pericardial effusion.     Pleura:  Small bilateral pleural effusion noted.     Systemic Veins:  The inferior vena cava is normal in size measuring 1.77 cm in diameter, (normal <2.1cm) with abnormal inspiratory collapse (abnormal <50%) consistent with mildly elevated right atrial pressure (~8, range 5-10mmHg).  ____________________________________________________________________  QUANTITATIVE DATA:  Left Ventricle Measurements: (Indexed to BSA)     IVSd (2D):   1.3 cm  LVPWd (2D):  1.2 cm  LVIDd (2D):  4.5 cm  LVIDs (2D):  2.7 cm  LV Mass:     199 g  114.9 g/m²  LV Vol d, MOD A2C: 66.0 ml 38.11 ml/m²  LV Vol d,MOD A4C: 61.7 ml 35.63 ml/m²  LV Vol d, MOD BP:  63.9 ml 36.89 ml/m²  LV Vol s, MOD A2C: 19.4 ml 11.20 ml/m²  LV Vol s, MOD A4C: 16.4 ml 9.47 ml/m²  LV Vol s, MOD BP:  18.2 ml 10.52 ml/m²  LVOT SV MOD BP:    45.7 ml  LV EF% MOD BP:     71 %     MV E Vmax:    1.23 m/s  MV A Vmax:    0.68 m/s  MV E/A:       1.82  e' lateral:   7.51 cm/s  e' medial:    3.05 cm/s  E/e' lateral: 16.38  E/e' medial:  40.33  E/e' Average: 23.30  MV DT:        207 msec    Aorta Measurements: (Normal range) (Indexed to BSA)     Ao Root d 3.00 cm (2.7 - 3.3 cm) 1.73 cm/m²  Ao Arch:  2.4 cm            Left Atrium Measurements: (Indexed to BSA)  LA Diam 2D: 4.10 cm         Right Atrial Measurements:     RA Vol s, MOD A4C         37.9 ml  RA Vol s, MOD A4C i BSA   21.88 ml/m²  RA Area s, MOD A4C        15.1 cm²  RA Area s, MOD A4C, i BSA 8.72 cm²/m²    Mitral Valve Measurements:     MV E Vmax: 1.2 m/s         MR Vmax:          4.19 m/s  MV A Vmax: 0.7 m/s         MR Peak Gradient: 70.2 mmHg  MV E/A:    1.8       Tricuspid Valve Measurements:     TR Vmax:          3.4 m/s  TR Peak Gradient: 45.7 mmHg  RA Pressure:      8 mmHg  PASP:             54 mmHg    ________________________________________________________________________________________  Electronically signedon 2025 at 1:09:26 PM by Daniel Jorge M.D.         *** Final ***      Radiology:

## 2025-06-03 NOTE — PROGRESS NOTE ADULT - SUBJECTIVE AND OBJECTIVE BOX
Patient is a 84y old  Female who presents with a chief complaint of Shortness of breath (02 Jun 2025 17:20)       INTERVAL HPI/OVERNIGHT EVENTS: Patient seen and examined at bedside. No new events/complaints noted. Still feels sob with ambulation     MEDICATIONS  (STANDING):  albuterol    0.083% 2.5 milliGRAM(s) Nebulizer every 6 hours  albuterol    90 MICROgram(s) HFA Inhaler 1 Puff(s) Inhalation every 4 hours  aMIOdarone    Tablet 100 milliGRAM(s) Oral daily  apixaban 2.5 milliGRAM(s) Oral every 12 hours  atorvastatin 10 milliGRAM(s) Oral at bedtime  fluticasone propionate/ salmeterol 250-50 MICROgram(s) Diskus 1 Dose(s) Inhalation two times a day  gabapentin 400 milliGRAM(s) Oral two times a day  leflunomide 10 milliGRAM(s) Oral daily  midodrine. 10 milliGRAM(s) Oral <User Schedule>  montelukast 10 milliGRAM(s) Oral daily  pantoprazole    Tablet 40 milliGRAM(s) Oral before breakfast  predniSONE   Tablet 40 milliGRAM(s) Oral daily  sodium chloride 3%  Inhalation 4 milliLiter(s) Inhalation every 12 hours  tiotropium 2.5 MICROgram(s) Inhaler 2 Puff(s) Inhalation daily  tiZANidine 2 milliGRAM(s) Oral at bedtime  torsemide 20 milliGRAM(s) Oral daily    MEDICATIONS  (PRN):  albuterol/ipratropium for Nebulization 3 milliLiter(s) Nebulizer every 6 hours PRN Shortness of Breath and/or Wheezing  dicyclomine 10 milliGRAM(s) Oral two times a day before meals PRN bowel irritability  melatonin 3 milliGRAM(s) Oral at bedtime PRN Insomnia      Allergies    No Known Allergies    Intolerances        REVIEW OF SYSTEMS:  CONSTITUTIONAL: No fever, weight loss, or fatigue  EYES: No eye pain, visual disturbances, or discharge  ENMT:  No difficulty hearing, tinnitus, vertigo; No sinus or throat pain  NECK: No pain or stiffness  BREASTS: No pain, masses, or nipple discharge  RESPIRATORY: No cough, wheezing, chills or hemoptysis; + shortness of breath on exertion   CARDIOVASCULAR: No chest pain, palpitations, dizziness, or leg swelling  GASTROINTESTINAL: No abdominal or epigastric pain. No nausea, vomiting, or hematemesis; No diarrhea or constipation.   ALLERGY AND IMMUNOLOGIC: No hives or eczema    Vital Signs Last 24 Hrs  T(C): 36.3 (03 Jun 2025 04:54), Max: 37.1 (02 Jun 2025 11:34)  T(F): 97.4 (03 Jun 2025 04:54), Max: 98.8 (02 Jun 2025 11:34)  HR: 56 (03 Jun 2025 04:54) (55 - 92)  BP: 104/66 (03 Jun 2025 04:54) (100/43 - 121/67)  BP(mean): --  RR: 18 (03 Jun 2025 04:54) (18 - 18)  SpO2: 99% (03 Jun 2025 04:54) (98% - 100%)    Parameters below as of 03 Jun 2025 04:54  Patient On (Oxygen Delivery Method): nasal cannula        PHYSICAL EXAM:  GENERAL: NAD, Awake, alert, able to speak in full sentences   HEAD:  Atraumatic, Normocephalic  EYES: EOMI, PERRLA, conjunctiva and sclera clear  ENMT: No tonsillar erythema, exudates, or enlargement; Moist mucous membranes  NECK: Supple, No JVD, Normal thyroid  NERVOUS SYSTEM:  Alert & Oriented X3, No focal motor/sensory deficits noted   CHEST/LUNG: Decreased bibasilar breath sounds no wheezing   HEART: Regular rate and rhythm; No murmurs, rubs, or gallops  ABDOMEN: Soft, Nontender, Nondistended; Bowel sounds present  EXTREMITIES:  2+ Peripheral Pulses, No clubbing, cyanosis  LYMPH: No lymphadenopathy noted  SKIN: No rashes or lesions      LABS:                        7.7    x     )-----------( x        ( 02 Jun 2025 09:55 )             24.2       Ca    9.3        02 Jun 2025 06:05        CAPILLARY BLOOD GLUCOSE        BLOOD CULTURE    RADIOLOGY & ADDITIONAL TESTS:    Imaging Personally Reviewed:  [ ] YES     Consultant(s) Notes Reviewed:      Care Discussed with Consultants/Other Providers:

## 2025-06-04 LAB
ANION GAP SERPL CALC-SCNC: 6 MMOL/L — SIGNIFICANT CHANGE UP (ref 5–17)
BUN SERPL-MCNC: 67 MG/DL — HIGH (ref 7–23)
CALCIUM SERPL-MCNC: 9.3 MG/DL — SIGNIFICANT CHANGE UP (ref 8.5–10.1)
CHLORIDE SERPL-SCNC: 100 MMOL/L — SIGNIFICANT CHANGE UP (ref 96–108)
CO2 SERPL-SCNC: 34 MMOL/L — HIGH (ref 22–31)
CREAT SERPL-MCNC: 1.6 MG/DL — HIGH (ref 0.5–1.3)
EGFR: 32 ML/MIN/1.73M2 — LOW
EGFR: 32 ML/MIN/1.73M2 — LOW
GLUCOSE SERPL-MCNC: 88 MG/DL — SIGNIFICANT CHANGE UP (ref 70–99)
HCT VFR BLD CALC: 26.6 % — LOW (ref 34.5–45)
HGB BLD-MCNC: 8.5 G/DL — LOW (ref 11.5–15.5)
MCHC RBC-ENTMCNC: 31.1 PG — SIGNIFICANT CHANGE UP (ref 27–34)
MCHC RBC-ENTMCNC: 32 G/DL — SIGNIFICANT CHANGE UP (ref 32–36)
MCV RBC AUTO: 97.4 FL — SIGNIFICANT CHANGE UP (ref 80–100)
NRBC BLD AUTO-RTO: 0 /100 WBCS — SIGNIFICANT CHANGE UP (ref 0–0)
PLATELET # BLD AUTO: 142 K/UL — LOW (ref 150–400)
POTASSIUM SERPL-MCNC: 5.2 MMOL/L — SIGNIFICANT CHANGE UP (ref 3.5–5.3)
POTASSIUM SERPL-SCNC: 5.2 MMOL/L — SIGNIFICANT CHANGE UP (ref 3.5–5.3)
RBC # BLD: 2.73 M/UL — LOW (ref 3.8–5.2)
RBC # FLD: 22.7 % — HIGH (ref 10.3–14.5)
SODIUM SERPL-SCNC: 140 MMOL/L — SIGNIFICANT CHANGE UP (ref 135–145)
WBC # BLD: 5.15 K/UL — SIGNIFICANT CHANGE UP (ref 3.8–10.5)
WBC # FLD AUTO: 5.15 K/UL — SIGNIFICANT CHANGE UP (ref 3.8–10.5)

## 2025-06-04 PROCEDURE — 71045 X-RAY EXAM CHEST 1 VIEW: CPT | Mod: 26

## 2025-06-04 PROCEDURE — 99233 SBSQ HOSP IP/OBS HIGH 50: CPT

## 2025-06-04 PROCEDURE — 76770 US EXAM ABDO BACK WALL COMP: CPT | Mod: 26

## 2025-06-04 PROCEDURE — 99232 SBSQ HOSP IP/OBS MODERATE 35: CPT

## 2025-06-04 RX ADMIN — PREDNISONE 40 MILLIGRAM(S): 20 TABLET ORAL at 05:35

## 2025-06-04 RX ADMIN — Medication 40 MILLIGRAM(S): at 06:13

## 2025-06-04 RX ADMIN — MIDODRINE HYDROCHLORIDE 10 MILLIGRAM(S): 5 TABLET ORAL at 15:06

## 2025-06-04 RX ADMIN — Medication 2.5 MILLIGRAM(S): at 13:06

## 2025-06-04 RX ADMIN — APIXABAN 2.5 MILLIGRAM(S): 2.5 TABLET, FILM COATED ORAL at 17:24

## 2025-06-04 RX ADMIN — TIZANIDINE 2 MILLIGRAM(S): 4 TABLET ORAL at 21:24

## 2025-06-04 RX ADMIN — Medication 2.5 MILLIGRAM(S): at 19:30

## 2025-06-04 RX ADMIN — TIOTROPIUM BROMIDE INHALATION SPRAY 2 PUFF(S): 3.12 SPRAY, METERED RESPIRATORY (INHALATION) at 08:38

## 2025-06-04 RX ADMIN — MIDODRINE HYDROCHLORIDE 10 MILLIGRAM(S): 5 TABLET ORAL at 21:24

## 2025-06-04 RX ADMIN — Medication 2.5 MILLIGRAM(S): at 07:27

## 2025-06-04 RX ADMIN — GABAPENTIN 400 MILLIGRAM(S): 400 CAPSULE ORAL at 17:23

## 2025-06-04 RX ADMIN — GABAPENTIN 400 MILLIGRAM(S): 400 CAPSULE ORAL at 05:35

## 2025-06-04 RX ADMIN — AMIODARONE HYDROCHLORIDE 100 MILLIGRAM(S): 50 INJECTION, SOLUTION INTRAVENOUS at 05:34

## 2025-06-04 RX ADMIN — LEFLUNOMIDE 10 MILLIGRAM(S): 10 TABLET ORAL at 11:53

## 2025-06-04 RX ADMIN — Medication 1 DOSE(S): at 20:18

## 2025-06-04 RX ADMIN — Medication 4 MILLILITER(S): at 07:27

## 2025-06-04 RX ADMIN — APIXABAN 2.5 MILLIGRAM(S): 2.5 TABLET, FILM COATED ORAL at 05:35

## 2025-06-04 RX ADMIN — Medication 20 MILLIGRAM(S): at 05:35

## 2025-06-04 RX ADMIN — MIDODRINE HYDROCHLORIDE 10 MILLIGRAM(S): 5 TABLET ORAL at 05:43

## 2025-06-04 RX ADMIN — MONTELUKAST SODIUM 10 MILLIGRAM(S): 10 TABLET ORAL at 11:52

## 2025-06-04 RX ADMIN — Medication 4 MILLILITER(S): at 19:31

## 2025-06-04 RX ADMIN — ATORVASTATIN CALCIUM 10 MILLIGRAM(S): 80 TABLET, FILM COATED ORAL at 21:23

## 2025-06-04 RX ADMIN — Medication 1 DOSE(S): at 08:39

## 2025-06-04 NOTE — PROGRESS NOTE ADULT - ASSESSMENT
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

## 2025-06-04 NOTE — PROGRESS NOTE ADULT - TIME BILLING
Patient seen and examined at bedside. Meds, labs, vitals, chart reviewed. Plan d/w Consultants
Reviewing chart notes and data, reviewing telemetry monitor records, face to face time counseling the patient, coordinating care with SW/CM at [facility].
Patient seen and examined at bedside. Meds, labs, vitals, chart reviewed. Plan d/w Consultants
Reviewing chart notes and data, reviewing telemetry monitor records, face to face time counseling the patient, coordinating care with SW/CM at [facility].
Patient seen and examined at bedside. Meds, labs, vitals, chart reviewed. Plan d/w Consultants
Patient seen and examined at bedside. Meds, labs, vitals, chart reviewed. Plan d/w patient, her daughter, Consultants
Reviewing chart notes and data, reviewing telemetry monitor records, face to face time counseling the patient, coordinating care with SW/CM at [facility].
Reviewing chart notes and data, reviewing telemetry monitor records, face to face time counseling the patient, coordinating care with SW/CM at [facility].
activities including direct patient care, counseling and/or coordinating care, reviewing notes/lab data/imaging, and discussion with multidisciplinary team

## 2025-06-04 NOTE — PROGRESS NOTE ADULT - SUBJECTIVE AND OBJECTIVE BOX
CHIEF COMPLAINT/ REASON FOR VISIT  .. Patient was seen to address the  issue listed under PROBLEM LIST which is located toward bottom of this note     MARTINEZ PATEL 2EAS 201 D1    Allergies    No Known Allergies    Intolerances        PAST MEDICAL & SURGICAL HISTORY:  COPD (chronic obstructive pulmonary disease)      DM (diabetes mellitus)  diet-controlled      PAF (paroxysmal atrial fibrillation)  s/p ablation      Hiatal hernia      H/O aplastic anemia      History of IBS      H/O osteoporosis      HLD (hyperlipidemia)      PMR (polymyalgia rheumatica)      History of basal cell cancer      Chronic kidney disease (CKD)      Hypotension      Presence of IVC filter      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      History of immunodeficiency      Lumbar compression fracture      H/O hiatal hernia      H/O pulmonary fibrosis      H/O sinus bradycardia      History of fracture of right hip  "unoperable"      S/P hysterectomy      S/P foot surgery      S/P knee surgery      After cataract  bilateral eye cataract surgically removed      Closed right hip fracture  rigth hip ORIF july 19 2016      S/P IVC filter  nov 2016      S/P carpal tunnel release  Right 2008 & 6/27/17      H/O kyphoplasty      Port-A-Cath in place  right chest          FAMILY HISTORY:  Family history of bladder cancer (Mother)    Family history of myocardial infarction (Father)    FH: atrial fibrillation (Father)        Home Medications:  amiodarone 200 mg oral tablet: 0.5 tab(s) orally once a day (27 May 2025 13:21)  Bentyl 10 mg oral capsule: 1 cap(s) orally 2 times a day, As Needed (27 May 2025 13:22)  Eliquis 2.5 mg oral tablet: 1 tab(s) orally 2 times a day (27 May 2025 12:39)  esomeprazole 20 mg oral delayed release capsule: 1 cap(s) orally once a day (27 May 2025 13:23)  folic acid 1 mg oral tablet: 1 tab(s) orally once a day (in the morning) (27 May 2025 12:39)  gabapentin 400 mg oral capsule: 1 cap(s) orally 2 times a day (27 May 2025 12:39)  IVIG monthly:  (27 May 2025 12:39)  leflunomide 10 mg oral tablet: 1 tab(s) orally once a day (27 May 2025 13:23)  midodrine 5 mg oral tablet: 2 tab(s) orally in the morning and 1 tab orally in the evening (27 May 2025 13:23)  montelukast 10 mg oral tablet: 1 tab(s) orally once a day (27 May 2025 13:23)  predniSONE 5 mg oral tablet: 1 tab(s) orally once a day (27 May 2025 13:22)  Procrit 10,000 units/mL preservative-free injectable solution: injectable once a week WEDNESDAY (27 May 2025 12:39)  Reclast Infusion , IV x 1 yearly:  (27 May 2025 13:22)  simvastatin 10 mg oral tablet: 1 tab(s) orally once a day (at bedtime) (27 May 2025 12:39)  tiZANidine: 2 tab(s) orally once a day (at bedtime) as needed for  muscle spasm (27 May 2025 13:22)  torsemide 20 mg oral tablet: 1 tab(s) orally once a day (in the morning) (27 May 2025 12:39)      MEDICATIONS  (STANDING):  albuterol    0.083% 2.5 milliGRAM(s) Nebulizer every 6 hours  albuterol    90 MICROgram(s) HFA Inhaler 1 Puff(s) Inhalation every 4 hours  aMIOdarone    Tablet 100 milliGRAM(s) Oral daily  apixaban 2.5 milliGRAM(s) Oral every 12 hours  atorvastatin 10 milliGRAM(s) Oral at bedtime  fluticasone propionate/ salmeterol 500-50 MICROgram(s) Diskus 1 Dose(s) Inhalation two times a day  gabapentin 400 milliGRAM(s) Oral two times a day  leflunomide 10 milliGRAM(s) Oral daily  midodrine. 10 milliGRAM(s) Oral every 8 hours  montelukast 10 milliGRAM(s) Oral daily  pantoprazole    Tablet 40 milliGRAM(s) Oral before breakfast  predniSONE   Tablet 40 milliGRAM(s) Oral daily  sodium chloride 3%  Inhalation 4 milliLiter(s) Inhalation every 12 hours  tiotropium 2.5 MICROgram(s) Inhaler 2 Puff(s) Inhalation daily  tiZANidine 2 milliGRAM(s) Oral at bedtime  torsemide 20 milliGRAM(s) Oral daily    MEDICATIONS  (PRN):  albuterol/ipratropium for Nebulization 3 milliLiter(s) Nebulizer every 6 hours PRN Shortness of Breath and/or Wheezing  dicyclomine 10 milliGRAM(s) Oral two times a day before meals PRN bowel irritability  melatonin 3 milliGRAM(s) Oral at bedtime PRN Insomnia      Diet, Regular:   DASH/TLC Sodium & Cholesterol Restricted  2000mL Fluid Restriction ([id number]) (05-26-25 @ 23:27) [Active]          Vital Signs Last 24 Hrs  T(C): 36.8 (04 Jun 2025 04:30), Max: 36.8 (03 Jun 2025 11:45)  T(F): 98.3 (04 Jun 2025 04:30), Max: 98.3 (04 Jun 2025 04:30)  HR: 54 (04 Jun 2025 07:43) (54 - 72)  BP: 168/65 (04 Jun 2025 04:30) (106/58 - 168/65)  BP(mean): --  RR: 18 (04 Jun 2025 04:30) (18 - 18)  SpO2: 100% (04 Jun 2025 07:43) (95% - 100%)    Parameters below as of 04 Jun 2025 07:43  Patient On (Oxygen Delivery Method): 2LNC          06-03-25 @ 07:01  -  06-04-25 @ 07:00  --------------------------------------------------------  IN: 960 mL / OUT: 2700 mL / NET: -1740 mL              LABS:                        8.5    4.81  )-----------( 116      ( 03 Jun 2025 06:10 )             25.9     06-03    139  |  100  |  68[H]  ----------------------------<  90  5.1   |  33[H]  |  1.30    Ca    9.3      03 Jun 2025 06:10        Urinalysis Basic - ( 03 Jun 2025 06:10 )    Color: x / Appearance: x / SG: x / pH: x  Gluc: 90 mg/dL / Ketone: x  / Bili: x / Urobili: x   Blood: x / Protein: x / Nitrite: x   Leuk Esterase: x / RBC: x / WBC x   Sq Epi: x / Non Sq Epi: x / Bacteria: x            WBC:  WBC Count: 4.81 K/uL (06-03 @ 06:10)  WBC Count: 6.01 K/uL (06-02 @ 06:05)  WBC Count: 3.12 K/uL (06-01 @ 07:10)      MICROBIOLOGY:  RECENT CULTURES:                  Sodium:  Sodium: 139 mmol/L (06-03 @ 06:10)  Sodium: 137 mmol/L (06-02 @ 06:05)  Sodium: 136 mmol/L (06-01 @ 07:10)      1.30 mg/dL 06-03 @ 06:10  1.50 mg/dL 06-02 @ 06:05  1.60 mg/dL 06-01 @ 07:10      Hemoglobin:  Hemoglobin: 8.5 g/dL (06-03 @ 06:10)  Hemoglobin: 7.7 g/dL (06-02 @ 09:55)  Hemoglobin: 7.6 g/dL (06-02 @ 06:05)  Hemoglobin: 8.2 g/dL (06-01 @ 07:10)      Platelets: Platelet Count - Automated: 116 K/uL (06-03 @ 06:10)  Platelet Count - Automated: 136 K/uL (06-02 @ 06:05)  Platelet Count - Automated: 155 K/uL (06-01 @ 07:10)          Urinalysis Basic - ( 03 Jun 2025 06:10 )    Color: x / Appearance: x / SG: x / pH: x  Gluc: 90 mg/dL / Ketone: x  / Bili: x / Urobili: x   Blood: x / Protein: x / Nitrite: x   Leuk Esterase: x / RBC: x / WBC x   Sq Epi: x / Non Sq Epi: x / Bacteria: x        RADIOLOGY & ADDITIONAL STUDIES:      MICROBIOLOGY:  RECENT CULTURES:

## 2025-06-04 NOTE — PROVIDER CONTACT NOTE (OTHER) - RECOMMENDATIONS
continue to observe, instructed the patient to call RN for anymore kind of bleedint continue to observe, instructed the patient to call RN for anymore kind of bleeding

## 2025-06-04 NOTE — PROGRESS NOTE ADULT - ASSESSMENT
84F Hx HTN, HLD, DM, PAF, CKD, COPD (not on home o2) PMR (chronic prednisone with AVN hip),aplastic anemia presented w/ worsening SOB and b/l LE edema; admitted for likely HFpEF exacerbation     p/w shortness of breath, multifactorial in nature, seemed mostly likely secondary to a COPD  - chronic lower extremity edema resolved    - S/p Lasix 40mg IV BID, and remains on Torsemide 20 daily    - Repeat TTE normal LV and RV size and function, EF 71%, PASP 54, increased LV mass and hypertrophy   - CXR 6/4 shows increase b/l pleural effsuion  + Anemia, transfusing per primary     - No evidence of any active ischemia  - Continue statin      - Known Afib   - Continue Eliquis, Amiodarone     - BP soft   - Continue Midodrine.     - Monitor and replete lytes, keep K>4, Mg>2.  - Will continue to follow.

## 2025-06-04 NOTE — PROGRESS NOTE ADULT - SUBJECTIVE AND OBJECTIVE BOX
Patient is a 84y old  Female who presents with a chief complaint of Shortness of breath (04 Jun 2025 12:14)      FROM ADMISSION H+P:   HPI:  85 y/o F with PMH AFon Eliquis, COPD (not on home o2), CKD, aplastic anemia, PMR (chronic prednisone with AVN hip), HLD, HTN, DM who p/w worsening SOB over the past two days, denies CP/ palpitations, has home O2 setup (but doesn't use) but put it on after noticing SpO2 in the 80's, which improved SOB. Endorses worsening SWAIN, denies significant wheezing, cough, recent illness med changes, or changes in diet. Adherent with home meds.    Denies fever, chills, chest pain, palpitations, cough, abdominal pain, nausea, vomiting, diarrhea, constipation, urinary frequency, urgency, or dysuria, headaches, changes in vision, dizziness, numbness, tingling.    ED Course:   Vitals: BP: 130/77, HR: 77, Temp: 98, RR: 26, SpO2: 96% on 4L NC  Labs: BNP 2185, trop WNL, WBC: 3.08 (around baseline), Hb 8.9 () (around baseline), HCO3- 32,  RVP negative,     CXR: linear interstitial prominence in b/l bases, trace pleural fluid, potential cardiogenic pulmonary edema, medi-port tip at cavoatrial junction, as per personal read, official read pending   B/L LE Dopplers: No evidence of DVT  EKG: NSR PACs  Received in the ED: lasix 20mg IV, solumedrol 125mg IV, duonebs   (26 May 2025 20:59)      INTERVAL HPI/OVERNIGHT EVENTS: Patient was seen and examined. No acute events occurred overnight. No new complaints.    I&O's Summary    03 Jun 2025 07:01  -  04 Jun 2025 07:00  --------------------------------------------------------  IN: 960 mL / OUT: 2700 mL / NET: -1740 mL    04 Jun 2025 07:01  -  04 Jun 2025 14:42  --------------------------------------------------------  IN: 0 mL / OUT: 1350 mL / NET: -1350 mL        PAST MEDICAL & SURGICAL HISTORY:  COPD (chronic obstructive pulmonary disease)      DM (diabetes mellitus)  diet-controlled      PAF (paroxysmal atrial fibrillation)  s/p ablation      Hiatal hernia      H/O aplastic anemia      History of IBS      H/O osteoporosis      HLD (hyperlipidemia)      PMR (polymyalgia rheumatica)      History of basal cell cancer      Chronic kidney disease (CKD)      Hypotension      Presence of IVC filter      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      History of immunodeficiency      Lumbar compression fracture      H/O hiatal hernia      H/O pulmonary fibrosis      H/O sinus bradycardia      History of fracture of right hip  "unoperable"      S/P hysterectomy      S/P foot surgery      S/P knee surgery      After cataract  bilateral eye cataract surgically removed      Closed right hip fracture  rigth hip ORIF july 19 2016      S/P IVC filter  nov 2016      S/P carpal tunnel release  Right 2008 & 6/27/17      H/O kyphoplasty      Port-A-Cath in place  right chest          LABS:                        8.5    5.15  )-----------( 142      ( 04 Jun 2025 07:02 )             26.6     06-04    140  |  100  |  67[H]  ----------------------------<  88  5.2   |  34[H]  |  1.60[H]    Ca    9.3      04 Jun 2025 07:02        Urinalysis Basic - ( 04 Jun 2025 07:02 )    Color: x / Appearance: x / SG: x / pH: x  Gluc: 88 mg/dL / Ketone: x  / Bili: x / Urobili: x   Blood: x / Protein: x / Nitrite: x   Leuk Esterase: x / RBC: x / WBC x   Sq Epi: x / Non Sq Epi: x / Bacteria: x      CAPILLARY BLOOD GLUCOSE            Urinalysis Basic - ( 04 Jun 2025 07:02 )    Color: x / Appearance: x / SG: x / pH: x  Gluc: 88 mg/dL / Ketone: x  / Bili: x / Urobili: x   Blood: x / Protein: x / Nitrite: x   Leuk Esterase: x / RBC: x / WBC x   Sq Epi: x / Non Sq Epi: x / Bacteria: x        MEDICATIONS  (STANDING):  albuterol    0.083% 2.5 milliGRAM(s) Nebulizer every 6 hours  albuterol    90 MICROgram(s) HFA Inhaler 1 Puff(s) Inhalation every 4 hours  aMIOdarone    Tablet 100 milliGRAM(s) Oral daily  apixaban 2.5 milliGRAM(s) Oral every 12 hours  atorvastatin 10 milliGRAM(s) Oral at bedtime  chlorhexidine 2% Cloths 1 Application(s) Topical daily  fluticasone propionate/ salmeterol 500-50 MICROgram(s) Diskus 1 Dose(s) Inhalation two times a day  gabapentin 400 milliGRAM(s) Oral two times a day  leflunomide 10 milliGRAM(s) Oral daily  midodrine. 10 milliGRAM(s) Oral every 8 hours  montelukast 10 milliGRAM(s) Oral daily  pantoprazole    Tablet 40 milliGRAM(s) Oral before breakfast  predniSONE   Tablet 40 milliGRAM(s) Oral daily  sodium chloride 3%  Inhalation 4 milliLiter(s) Inhalation every 12 hours  tiotropium 2.5 MICROgram(s) Inhaler 2 Puff(s) Inhalation daily  tiZANidine 2 milliGRAM(s) Oral at bedtime  torsemide 20 milliGRAM(s) Oral daily    MEDICATIONS  (PRN):  albuterol/ipratropium for Nebulization 3 milliLiter(s) Nebulizer every 6 hours PRN Shortness of Breath and/or Wheezing  dicyclomine 10 milliGRAM(s) Oral two times a day before meals PRN bowel irritability  melatonin 3 milliGRAM(s) Oral at bedtime PRN Insomnia      REVIEW OF SYSTEMS:  CONSTITUTIONAL: No fever or chills  HEENT:  No headache, no sore throat  RESPIRATORY: No cough, wheezing, or shortness of breath  CARDIOVASCULAR: No chest pain, palpitations  GASTROINTESTINAL: No abdominal pain, nausea, vomiting, or diarrhea  GENITOURINARY: No dysuria, frequency, or hematuria  NEUROLOGICAL: no focal weakness or dizziness  MUSCULOSKELETAL: no myalgias  PSYCH: no recent changes in mood    RADIOLOGY & ADDITIONAL TESTS:    Imaging Personally Reviewed:  [x] YES  [ ] NO    Consultant(s) Notes Reviewed:  [x] YES  [ ] NO    PHYSICAL EXAM:  T(C): 37 (06-04-25 @ 11:07), Max: 37 (06-04-25 @ 11:07)  HR: 61 (06-04-25 @ 13:12) (54 - 69)  BP: 107/65 (06-04-25 @ 11:07) (106/58 - 168/65)  RR: 20 (06-04-25 @ 11:07) (18 - 20)  SpO2: 98% (06-04-25 @ 13:12) (95% - 100%)    GENERAL: NAD, well-developed, well-groomed  HEENT:  anicteric, moist mucous membranes  CHEST/LUNG:  CTA b/l, no rales, wheezes, or rhonchi  HEART:  RRR, S1, S2  ABDOMEN:  BS+, soft, nontender, nondistended  EXTREMITIES: no edema, cyanosis, or calf tenderness  NERVOUS SYSTEM: answers questions and follows commands appropriately  PSYCH: normal affect    Care Discussed with Consultants/Other Providers [x] YES  [ ] NO Patient is a 84y old  Female who presents with a chief complaint of Shortness of breath (04 Jun 2025 12:14)      FROM ADMISSION H+P:   HPI:  85 y/o F with PMH AFon Eliquis, COPD (not on home o2), CKD, aplastic anemia, PMR (chronic prednisone with AVN hip), HLD, HTN, DM who p/w worsening SOB over the past two days, denies CP/ palpitations, has home O2 setup (but doesn't use) but put it on after noticing SpO2 in the 80's, which improved SOB. Endorses worsening SWAIN, denies significant wheezing, cough, recent illness med changes, or changes in diet. Adherent with home meds.    Denies fever, chills, chest pain, palpitations, cough, abdominal pain, nausea, vomiting, diarrhea, constipation, urinary frequency, urgency, or dysuria, headaches, changes in vision, dizziness, numbness, tingling.    ED Course:   Vitals: BP: 130/77, HR: 77, Temp: 98, RR: 26, SpO2: 96% on 4L NC  Labs: BNP 2185, trop WNL, WBC: 3.08 (around baseline), Hb 8.9 () (around baseline), HCO3- 32,  RVP negative,     CXR: linear interstitial prominence in b/l bases, trace pleural fluid, potential cardiogenic pulmonary edema, medi-port tip at cavoatrial junction, as per personal read, official read pending   B/L LE Dopplers: No evidence of DVT  EKG: NSR PACs  Received in the ED: lasix 20mg IV, solumedrol 125mg IV, duonebs   (26 May 2025 20:59)      INTERVAL HPI/OVERNIGHT EVENTS: Patient was seen and examined. No acute events occurred overnight. Feels short of breath. Remains on O2. Denies anxiety although appears anxious. No chest pain, abd pain, n/v/d.    Later in afternoon, had small amount of blood in stool although no significant bleeding. She follows with Dr. Parker for hematology & Dr. Gallegos for pulmonology.    I&O's Summary    03 Jun 2025 07:01  -  04 Jun 2025 07:00  --------------------------------------------------------  IN: 960 mL / OUT: 2700 mL / NET: -1740 mL    04 Jun 2025 07:01  -  04 Jun 2025 14:42  --------------------------------------------------------  IN: 0 mL / OUT: 1350 mL / NET: -1350 mL        PAST MEDICAL & SURGICAL HISTORY:  COPD (chronic obstructive pulmonary disease)      DM (diabetes mellitus)  diet-controlled      PAF (paroxysmal atrial fibrillation)  s/p ablation      Hiatal hernia      H/O aplastic anemia      History of IBS      H/O osteoporosis      HLD (hyperlipidemia)      PMR (polymyalgia rheumatica)      History of basal cell cancer      Chronic kidney disease (CKD)      Hypotension      Presence of IVC filter      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      History of immunodeficiency      Lumbar compression fracture      H/O hiatal hernia      H/O pulmonary fibrosis      H/O sinus bradycardia      History of fracture of right hip  "unoperable"      S/P hysterectomy      S/P foot surgery      S/P knee surgery      After cataract  bilateral eye cataract surgically removed      Closed right hip fracture  rigth hip ORIF july 19 2016      S/P IVC filter  nov 2016      S/P carpal tunnel release  Right 2008 & 6/27/17      H/O kyphoplasty      Port-A-Cath in place  right chest          LABS:                        8.5    5.15  )-----------( 142      ( 04 Jun 2025 07:02 )             26.6     06-04    140  |  100  |  67[H]  ----------------------------<  88  5.2   |  34[H]  |  1.60[H]    Ca    9.3      04 Jun 2025 07:02        Urinalysis Basic - ( 04 Jun 2025 07:02 )    Color: x / Appearance: x / SG: x / pH: x  Gluc: 88 mg/dL / Ketone: x  / Bili: x / Urobili: x   Blood: x / Protein: x / Nitrite: x   Leuk Esterase: x / RBC: x / WBC x   Sq Epi: x / Non Sq Epi: x / Bacteria: x      CAPILLARY BLOOD GLUCOSE            Urinalysis Basic - ( 04 Jun 2025 07:02 )    Color: x / Appearance: x / SG: x / pH: x  Gluc: 88 mg/dL / Ketone: x  / Bili: x / Urobili: x   Blood: x / Protein: x / Nitrite: x   Leuk Esterase: x / RBC: x / WBC x   Sq Epi: x / Non Sq Epi: x / Bacteria: x        MEDICATIONS  (STANDING):  albuterol    0.083% 2.5 milliGRAM(s) Nebulizer every 6 hours  albuterol    90 MICROgram(s) HFA Inhaler 1 Puff(s) Inhalation every 4 hours  aMIOdarone    Tablet 100 milliGRAM(s) Oral daily  apixaban 2.5 milliGRAM(s) Oral every 12 hours  atorvastatin 10 milliGRAM(s) Oral at bedtime  chlorhexidine 2% Cloths 1 Application(s) Topical daily  fluticasone propionate/ salmeterol 500-50 MICROgram(s) Diskus 1 Dose(s) Inhalation two times a day  gabapentin 400 milliGRAM(s) Oral two times a day  leflunomide 10 milliGRAM(s) Oral daily  midodrine. 10 milliGRAM(s) Oral every 8 hours  montelukast 10 milliGRAM(s) Oral daily  pantoprazole    Tablet 40 milliGRAM(s) Oral before breakfast  predniSONE   Tablet 40 milliGRAM(s) Oral daily  sodium chloride 3%  Inhalation 4 milliLiter(s) Inhalation every 12 hours  tiotropium 2.5 MICROgram(s) Inhaler 2 Puff(s) Inhalation daily  tiZANidine 2 milliGRAM(s) Oral at bedtime  torsemide 20 milliGRAM(s) Oral daily    MEDICATIONS  (PRN):  albuterol/ipratropium for Nebulization 3 milliLiter(s) Nebulizer every 6 hours PRN Shortness of Breath and/or Wheezing  dicyclomine 10 milliGRAM(s) Oral two times a day before meals PRN bowel irritability  melatonin 3 milliGRAM(s) Oral at bedtime PRN Insomnia      REVIEW OF SYSTEMS:  CONSTITUTIONAL: No fever or chills  HEENT:  No headache, no sore throat  RESPIRATORY: No cough, wheezing, + shortness of breath  CARDIOVASCULAR: No chest pain, palpitations; + SWAIN  GASTROINTESTINAL: No abdominal pain, nausea, vomiting, or diarrhea  GENITOURINARY: No dysuria, frequency, or hematuria  NEUROLOGICAL: no focal weakness or dizziness  MUSCULOSKELETAL: no myalgias  PSYCH: no recent changes in mood    RADIOLOGY & ADDITIONAL TESTS:    Imaging Personally Reviewed:  [x] YES  [ ] NO    Consultant(s) Notes Reviewed:  [x] YES  [ ] NO    PHYSICAL EXAM:  T(C): 37 (06-04-25 @ 11:07), Max: 37 (06-04-25 @ 11:07)  HR: 61 (06-04-25 @ 13:12) (54 - 69)  BP: 107/65 (06-04-25 @ 11:07) (106/58 - 168/65)  RR: 20 (06-04-25 @ 11:07) (18 - 20)  SpO2: 98% (06-04-25 @ 13:12) (95% - 100%)    GENERAL: NAD, on O2 via NC  HEENT:  anicteric, moist mucous membranes  CHEST/LUNG: decreased air entry bilaterally although clear to ausculation with no wheeze or rales  HEART:  RRR, S1, S2  ABDOMEN:  BS+, soft, nontender, nondistended  EXTREMITIES: no edema, cyanosis, or calf tenderness  NERVOUS SYSTEM: answers questions and follows commands appropriately  PSYCH: appears anxious    Care Discussed with Consultants/Other Providers [x] YES  [ ] NO

## 2025-06-04 NOTE — PROGRESS NOTE ADULT - NSPROGADDITIONALINFOA_GEN_ALL_CORE
d/w pt, daughter, outpatient pulm MD, cards consultant
plan of care discussed with daughter over phone and at bedside

## 2025-06-04 NOTE — PROGRESS NOTE ADULT - SUBJECTIVE AND OBJECTIVE BOX
[INTERVAL HX: ]  Patient seen and examined;  Chart reviewed and events noted;     no CP, no SOB      [MEDICATIONS]  MEDICATIONS  (STANDING):  albuterol    0.083% 2.5 milliGRAM(s) Nebulizer every 6 hours  albuterol    90 MICROgram(s) HFA Inhaler 1 Puff(s) Inhalation every 4 hours  aMIOdarone    Tablet 100 milliGRAM(s) Oral daily  apixaban 2.5 milliGRAM(s) Oral every 12 hours  atorvastatin 10 milliGRAM(s) Oral at bedtime  fluticasone propionate/ salmeterol 500-50 MICROgram(s) Diskus 1 Dose(s) Inhalation two times a day  gabapentin 400 milliGRAM(s) Oral two times a day  leflunomide 10 milliGRAM(s) Oral daily  midodrine. 10 milliGRAM(s) Oral every 8 hours  montelukast 10 milliGRAM(s) Oral daily  pantoprazole    Tablet 40 milliGRAM(s) Oral before breakfast  predniSONE   Tablet 40 milliGRAM(s) Oral daily  sodium chloride 3%  Inhalation 4 milliLiter(s) Inhalation every 12 hours  tiotropium 2.5 MICROgram(s) Inhaler 2 Puff(s) Inhalation daily  tiZANidine 2 milliGRAM(s) Oral at bedtime  torsemide 20 milliGRAM(s) Oral daily    MEDICATIONS  (PRN):  albuterol/ipratropium for Nebulization 3 milliLiter(s) Nebulizer every 6 hours PRN Shortness of Breath and/or Wheezing  dicyclomine 10 milliGRAM(s) Oral two times a day before meals PRN bowel irritability  melatonin 3 milliGRAM(s) Oral at bedtime PRN Insomnia      [VITALS]  Vital Signs Last 24 Hrs  T(C): 36.8 (2025 04:30), Max: 36.8 (2025 11:45)  T(F): 98.3 (2025 04:30), Max: 98.3 (2025 04:30)  HR: 54 (2025 07:43) (54 - 72)  BP: 168/65 (2025 04:30) (106/58 - 168/65)  BP(mean): --  RR: 18 (2025 04:30) (18 - 18)  SpO2: 100% (2025 07:43) (95% - 100%)    Parameters below as of 2025 07:43  Patient On (Oxygen Delivery Method): 2LNC      [WT/HT]  Daily     Daily Weight in k (2025 04:30)  [VENT]      [PHYSICAL EXAM]  GEN: NAD  HEENT: normocephalic and atraumatic. EOMI. PERRL.    NECK: Supple.  No lymphadenopathy   LUNGS: Clear to auscultation.  HEART: Regular rate and rhythm,  no MRG  ABDOMEN: Soft, nontender, and nondistended.  Positive bowel sounds.    : No CVA tenderness  EXTREMITIES: Without edema.  NEUROLOGIC: grossly intact.  PSYCHIATRIC: Appropriate affect .  SKIN: No rash     [LABS:]                        8.5    5.15  )-----------( 142      ( 2025 07:02 )             26.6     06-04    140  |  100  |  67[H]  ----------------------------<  88  5.2   |  34[H]  |  1.60[H]    Ca    9.3      2025 07:02            Folate, Serum: >20.0 ng/mL (25 @ 09:20)    Vitamin B12, Serum: 1444 pg/mL *H* [232 - 1245] (25 @ 09:20)    Iron - Total Binding Capacity.: Unable to calculate Test Repeated ug/dL [220 - 430] (23 @ 14:00)    Ferritin: 1342 ng/mL *H* [13 - 330] (23 @ 14:00)    Reticulocyte Count (23 @ 14:00)  Reticulocyte Percent: 2.0 % [0.5 - 2.5]  Absolute Reticulocytes: 39.0 K/uL [25.0 - 125.0]    Vitamin B12, Serum: 663 pg/mL [232 - 1245] (23 @ 14:00)    Folate, Serum: 16.2 ng/mL (23 @ 14:00)    Serum Protein Electrophoresis Interp: Normal Electrophoresis Pattern (23 @ 14:00)    Immunofixation, Serum:   No Monoclonal Band Identified      Reference Range: None Detected (23 @ 14:00)      Urinalysis Basic - ( 2025 07:02 )    Color: x / Appearance: x / SG: x / pH: x  Gluc: 88 mg/dL / Ketone: x  / Bili: x / Urobili: x   Blood: x / Protein: x / Nitrite: x   Leuk Esterase: x / RBC: x / WBC x   Sq Epi: x / Non Sq Epi: x / Bacteria: x        SARS-CoV-2: NotDetec (26 May 2025 18:00)          [RADIOLOGY STUDIES:]

## 2025-06-04 NOTE — PROGRESS NOTE ADULT - ASSESSMENT
Prerenal azotemia, CKD 3a  Dyspnea: COPD/CHF  Diabetes  Hypertension  Aplastic anemia, requiring frequent blood transfusions    Stable renal indices. Mild increase in creatinine trend with diuresis. On torsemide. Will get kidney and bladder sonogram.   Monitor blood sugar levels. Insulin coverage as needed. Trend BP and titrate BP meds as needed. Monitor h/h trend. Transfuse PRBC's PRN.   Avoid nephrotoxic meds as possible. Will follow electrolytes and renal function trend. D/w patient regarding need for out patient nephrology follow up.

## 2025-06-04 NOTE — PROGRESS NOTE ADULT - ASSESSMENT
REASON FOR VISIT  .. Management of problems listed below      EVENTS/CURRENT ISSUES.  .... 6/2/2025 pt cannot tolerate bpap   .... 5/30/2025 abg shows metabolic alkalosis and resp acidosis noct bpap ordered   .... 5/29/2025 pred changed solumed   .... 5/28/2025 lasix changed to torsemide   .... 5/27/2025 being rxed for copd chf   .... 5/27/2025 83 y/o F with PMH AFon Eliquis, COPD (not on home o2), CKD, aplastic anemia, PMR (chronic prednisone with AVN hip aw sob wheeze         REVIEW OF SYMPTOMS   Able to give ROS  Yes     RELIABILITY +/-   CONSTITUTIONAL Weakness Yes    ENDOCRINE  No heat or cold intolerance    ALLERGY No hives  Sore throat No stridor  RESP Shortness of breath YES   NEURO New weakness No   CARDIAC   Palpitations No         PHYSICAL EXAM    HEENT Unremarkable  atraumatic   RESP Fair air entry  Harsh breath sound   CARDIAC S1 S2 No S3     NO JVD    ABDOMEN No hepatosplenomegaly   PEDAL EDEMA present No calf tenderness  NO rash     REASON FOR VISIT  .. Management of problems listed below      CC.   . 5/26/2025 c/o shortness of breath- was 87% on room air- patient was wheezing- poatient given 1 albuterol treatment by ems- on 4 litres patient saturating at 96%  shortness of breath  OVERALL PRESENTATION.  . 5/26/2025 83 y/o F with PMH AFon Eliquis, COPD (not on home o2), CKD, aplastic anemia, PMR (chronic prednisone with AVN hip), HLD, HTN, DM who p/w worsening SOB over the past two days, denies CP/ palpitations, has home O2 setup (but doesn't use) but put it on after noticing SpO2 in the 80's, which improved SOB. Endorses worsening SWAIN, denies significant wheezing, cough, recent illness med changes, or changes in diet. Adherent with home meds.    Denies fever, chills, chest pain, palpitations, cough, abdominal pain, nausea, vomiting, diarrhea, constipation, urinary frequency, urgency, or dysuria, headaches, changes in vision, dizziness, numbness, tingling.    ED Course:   Vitals: BP: 130/77, HR: 77, Temp: 98, RR: 26, SpO2: 96% on 4L NC  Labs: BNP 2185, trop WNL, WBC: 3.08 (around baseline), Hb 8.9 () (around baseline), HCO3- 32,  RVP negative,     CXR: linear interstitial prominence in b/l bases, trace pleural fluid, potential cardiogenic pulmonary edema, medi-port tip at cavoatrial junction, as per personal read, official read pending   B/L LE Dopplers: No evidence of DVT  EKG: NSR PACs  Received in the ED: lasix 20mg IV, solumedrol 125mg IV, duonebs  PMH.      PAST HOSPITAL STAYS .  Home Medications:   * No Current Medications as of 05-May-2025 09:54 documented in Structured Notes  · folic acid 1 mg oral tablet: 1 tab(s) orally once a day (in the morning)  · midodrine 5 mg oral tablet: 1 tablet orally 3 times a day  · torsemide 20 mg oral tablet: 1 tab(s) orally once a day (in the morning)  · gabapentin 400 mg oral capsule: 1 cap(s) orally 2 times a day  · tiZANidine: 2 tab(s) orally once a day (at bedtime)  · Singulair 10 mg oral tablet: 1 tab(s) orally once a day (in the morning)  · Bentyl 10 mg oral capsule: 1 cap(s) orally 2 times a day, As Needed  · Leflunomide: once a day (after a meal)  · NexIUM 20 mg oral delayed release capsule: 1 cap(s) orally once a day (in the morning)  · simvastatin 10 mg oral tablet: 1 tab(s) orally once a day (at bedtime)  · Reclast Infusion , IV x 1 yearly:   · Vitamin D3 , 1 tablet by mouth 3x a week:   · IVIG monthly:   · amiodarone 100 mg oral tablet: 1 tab(s) orally once a day (in the morning)  · oxyCODONE 5 mg oral tablet: 1 tab(s) orally prn  · Eliquis 2.5 mg oral tablet: 1 tab(s) orally 2 times a day  · Procrit 10,000   ER MGMT .      BEST PRACTICE ISSUES.  . HOB ELEVATN.    .... Yes  . DIET  .   .... DASH 2L FR 5/26  . FREE WATER.   ....   .  IV FLUID.  .....   . PHARMAC DVT PPLX .    .... APIXABA 5/26 a 2.5 x 2 (nv af)   . NON PHARMAC DVT PPLX .      . STRESS ULCR PPLX .   .... PROTONIX 40 5/27/2025  . DATE/DM MGMT.   ..... See under Endocrine section   GENERAL DATA .   . COVID.         .... scv2 5/27/2025 (-)   . GOC.    ....    . ICU STAY.    .... no   . INFECTION PPLX .   ....   . ALLGY.   ....  nka  . WT.   .... 5/27/2025 64  . BMI.  .... 5/27/2025 25       [id number]  VITALS/GAS EXCHANGE/DRIPS    ABGS.   . 5/27 declined ordered abg   . 5/30/2025 8a 3l 744/51/86   .  VS/ PO/IO/ VENT/ DRIPS.  6/4/2025 afeb 58 100/65   6/4/2025 2l 99%     XXXXXXXX[id number]  PROBLEM ASSESSMENT RECOMMENDATIONS.  RESP.   . GAS EXCHANGE .   .... target PO 90-95%   .... 5/28/2025 check abg to see if shes retaining co2     . HYPERCAPNIA   .... 5/30/2025 8a 3l 744/51/86   .... 5/30/2025 tco2 34   .... 5/30/2025 abg analysis suggests metabolic alkalosis plus resp acidosis   .... 5/30/2025 as pt co sob will try noct bpap 12/6/16/28   .... 6/4/2025 pt unable to tolerate bpap     . SOB  .... DD COPD CHF     . RO VTE  .... venous duplx 5/26 (-)    . COPD  .... GIVEN PREDNISONE 50 5/27-5/29/2025 X 5D  .... SOLU   ........ 40.2 5/29/2025   ........ 40 5/31-6/2/2025   .... PRED 40 6/2/2025  .... NACL 3% bid 5/29   .... DUONEB.4 P 5/27/2025  .... ALBUTEROL.4 5/27  .... ADVAIR   ........ A 250 5/27   ........ A 500 6/3/2025   .... MONTELUKAST 10 5/30/2025   .... SPIRIVA 5/27  .... 6/4/2025 May dc prednisone by tapering over next 4 d     INFECTION.  . DATA  .... w 5/26-5/27-5/29-5/30 W 3 -  1.7 - 3.2- 2   .... pr 5/27/2025 pr .29   .... cxr 5/26 napd   .... ct  5/27/2025 cw 9/2/2023   ........ mild tracheobr secretns   ........ mosaic attenuation   ........ bl nodularity similar   ....... scattered linear and dependent atelectasis   ....... sm bl effsns   ....... mod indeterminate t8 veretebral body fr new   .... ct  5/31/2025   ......... incr small pl effs   ........ dilated pulm arteries suggestive of ph   .... rvp 5/26/2025 rvp (-)   .... no obvious active infectn      CARDIAC.  . ORTHOSTATIC HYPOTENSION  .... has v significant orthostatic hypotension   .... MIDODRINE   ........ m 10.2 5/26  ........ m 10.3 6/3/2025     . CAD    .... Trop 5/27/2025 tr 15       . CHF  .... 5/27/2025  1 + p edema  .... pbnp 5/2705/30/2025 pbnp 2185- 2330   .... tte 4/2025 mild ph  n vf   .... lasix   ........ 5/26 l 40.2   ....... 5/28/2025 l dced   .... torsemide 5/28/2025 t 20   .... check tte     . A fib   .... APIXABA 5/26 a 2.5 x 2   .... AMIODARONE 5/27/2025 5/26 A 100    HEMAT.  .... Plt 5/27/2025 plt 170   ....  monitor    . IMMUNOSUPPRESSIVE  .... LEFLUNAMIDE 10 5/27/2025    . ANEMIA  .... Hb 5/27-5/28-5/9-5/30-5/31-6/2-6/4/2025   .... HB 10- 8.9- 8.9  - 8.4 - 8.2 - 7.6- 8.5    .... fa 5/27/2025 fa > 20   .... b12 5/27/2025 b12 1444   .... target hb 7 (+)    . TRANSFUSION  .... 6/2/2025 1 u prbc    . LEUKOPENIA   .... w 5/26-5/27-5/28 W 3 -  1.7 - 2.2    GI.   . DIET .   .... dash 2 liter fr 5/26    RENAL.  . DATA  .... Na 5/27/2025 Na 139   .... CO2 5/27/2025 co2 30   .... monitor    . HYPERKALEMIA   .... K 5/27-5/28/2025 K 5.7 - 4.6     . KIMBERLY ON CKD  .... Cr 5/27-5/28-5/29-5/30-5/31-6/1-6/4/2025   .... Cr 1.2 - 1.3 - 1.3 - 1.6 - 1.4 - 1.6- 1.6     NEURO.  . PAIN  .... TIZANADINE 5/26 T 2 HS       XXXXXXXXXXXXXXXXXXXXXX   SUMMARY BASELINE .     . 83 y/o F with PMH AFon Eliquis, COPD (not on home o2), CKD, aplastic anemia, PMR (chronic prednisone with AVN hip), HLD, HTN, DM pt of Dr Gallegos not on home ox or home cpap used to use trelegy lives with spouse quit 40 y 1/2 ppd  CC.   . 5/26/2025 SHORTNESS OF BREATH   . 5/26/2026 HYPOXEMIA   . 5/26/2025 WHEEZE   MAIN ISSUES.  . HYPOXIA 5/26/2025  . RESP FAILURE   .... 5/30/2025 nict bpap prdrd   .... 6/1/2025 cannot tolerate bpap   . SOB  .... DD COPD CHF   .... venous duplx 5/26 (-)  . COPD ex   . A fib   .... APIXABA 5/26 a 2.5 x 2  . CHF  .... pbnp 5/27/2025 pbnp 2185  . ANEMIA  .... Hb 5/27/2025 HB 10  . TRANSFUSION  .... 6/2/2025 1 u prbc   . LEUKOPENIA   .... w 5/26-5/27 W 3 -  1.7   . KIMBERLY ON CKD  .... Cr 5/27-5/28-5/29-5/30-5/31-6/1/2025   .... Cr 1.2 - 1.3 - 1.3 - 1.6 - 1.4 - 1.6    PMH.   . AFon Eliquis,   . COPD (not on home o2),   . CKD,   . aplastic anemia,   . PMR (chronic prednisone with AVN hip),   . HLD,   . HTN,   . DM    PROCEDURES/DEVICES .      DISCUSSIONS.  .... Discussed with primary care and relevant consultants on an ongoing basis  .... 6/4/2025 Case dw cardio        TIME SPENT.  . Over 36 minutes aggregate care time spent on encounter; activities included   direct patient care, counseling and/or coordinating care reviewing notes, lab data/ imaging , discussion with multidisciplinary team/ patient  /family and explaining in detail risks, benefits, alternatives  of the recommendations     ROBERT HENRIQUEZ 84 5/27/2025 1942

## 2025-06-04 NOTE — PROGRESS NOTE ADULT - SUBJECTIVE AND OBJECTIVE BOX
[facility] -- Sai Valle, Girish Jackson Savella, [place]: Office # [phone]    Follow Up:  SOB Anemia   Subjective/Observations: Patient seen and examined. Patient alert awake, Denies chest pain palpitation dizziness,  no orthopnea or PND noted. On 2L NC, + difficulty breathing ,      REVIEW OF SYSTEMS: All other review of systems are negative unless indicated above    PAST MEDICAL & SURGICAL HISTORY:  COPD (chronic obstructive pulmonary disease)      DM (diabetes mellitus)  diet-controlled      PAF (paroxysmal atrial fibrillation)  s/p ablation      Hiatal hernia      H/O aplastic anemia      History of IBS      H/O osteoporosis      HLD (hyperlipidemia)      PMR (polymyalgia rheumatica)      History of basal cell cancer      Chronic kidney disease (CKD)      Hypotension      Presence of IVC filter      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      History of immunodeficiency      Lumbar compression fracture      H/O hiatal hernia      H/O pulmonary fibrosis      H/O sinus bradycardia      History of fracture of right hip  "unoperable"      S/P hysterectomy      S/P foot surgery      S/P knee surgery      After cataract  bilateral eye cataract surgically removed      Closed right hip fracture  rigth hip ORIF july 19 2016      S/P IVC filter  nov 2016      S/P carpal tunnel release  Right 2008 & 6/27/17      H/O kyphoplasty      Port-A-Cath in place  right chest          MEDICATIONS  (STANDING):  albuterol    0.083% 2.5 milliGRAM(s) Nebulizer every 6 hours  albuterol    90 MICROgram(s) HFA Inhaler 1 Puff(s) Inhalation every 4 hours  aMIOdarone    Tablet 100 milliGRAM(s) Oral daily  apixaban 2.5 milliGRAM(s) Oral every 12 hours  atorvastatin 10 milliGRAM(s) Oral at bedtime  fluticasone propionate/ salmeterol 500-50 MICROgram(s) Diskus 1 Dose(s) Inhalation two times a day  gabapentin 400 milliGRAM(s) Oral two times a day  leflunomide 10 milliGRAM(s) Oral daily  midodrine. 10 milliGRAM(s) Oral every 8 hours  montelukast 10 milliGRAM(s) Oral daily  pantoprazole    Tablet 40 milliGRAM(s) Oral before breakfast  predniSONE   Tablet 40 milliGRAM(s) Oral daily  sodium chloride 3%  Inhalation 4 milliLiter(s) Inhalation every 12 hours  tiotropium 2.5 MICROgram(s) Inhaler 2 Puff(s) Inhalation daily  tiZANidine 2 milliGRAM(s) Oral at bedtime  torsemide 20 milliGRAM(s) Oral daily    MEDICATIONS  (PRN):  albuterol/ipratropium for Nebulization 3 milliLiter(s) Nebulizer every 6 hours PRN Shortness of Breath and/or Wheezing  dicyclomine 10 milliGRAM(s) Oral two times a day before meals PRN bowel irritability  melatonin 3 milliGRAM(s) Oral at bedtime PRN Insomnia    Allergies    No Known Allergies    Intolerances      Vital Signs Last 24 Hrs  T(C): 36.8 (04 Jun 2025 04:30), Max: 36.8 (03 Jun 2025 11:45)  T(F): 98.3 (04 Jun 2025 04:30), Max: 98.3 (04 Jun 2025 04:30)  HR: 54 (04 Jun 2025 07:43) (54 - 72)  BP: 168/65 (04 Jun 2025 04:30) (106/58 - 168/65)  BP(mean): --  RR: 18 (04 Jun 2025 04:30) (18 - 18)  SpO2: 100% (04 Jun 2025 07:43) (95% - 100%)    Parameters below as of 04 Jun 2025 07:43  Patient On (Oxygen Delivery Method): 2LNC      I&O's Summary    03 Jun 2025 07:01  -  04 Jun 2025 07:00  --------------------------------------------------------  IN: 960 mL / OUT: 2700 mL / NET: -1740 mL          TELE: SB/ NSR 50- 60, ST 120s  PHYSICAL EXAM:  Constitutional: NAD, awake and alert,   HEENT: Moist Mucous Membranes, Anicteric  Pulmonary: + mild labour breathing b/l lung  diminished  BS at bases, No wheezing, rales or rhonchi  Cardiovascular: Regular, S1 and S2, No murmurs, No rubs, gallops or clicks  Gastrointestinal:  soft, nontender, nondistended   Lymph: No peripheral edema. No lymphadenopathy.   Skin: No visible rashes or ulcers.  Psych:  Mood & affect appropriate      LABS: All Labs Reviewed:                        8.5    5.15  )-----------( 142      ( 04 Jun 2025 07:02 )             26.6                         8.5    4.81  )-----------( 116      ( 03 Jun 2025 06:10 )             25.9                         7.7    x     )-----------( x        ( 02 Jun 2025 09:55 )             24.2     04 Jun 2025 07:02    140    |  100    |  67     ----------------------------<  88     5.2     |  34     |  1.60   03 Jun 2025 06:10    139    |  100    |  68     ----------------------------<  90     5.1     |  33     |  1.30   02 Jun 2025 06:05    137    |  97     |  71     ----------------------------<  94     4.4     |  33     |  1.50     Ca    9.3        04 Jun 2025 07:02  Ca    9.3        03 Jun 2025 06:10  Ca    9.3        02 Jun 2025 06:05    TPro  5.7    /  Alb  3.6    /  TBili  1.0    /  DBili  x      /  AST  8      /  ALT  26     /  AlkPhos  30     02 Jun 2025 06:05     Troponin I, High Sensitivity Result: 15.2 ng/L (05-26-25 @ 18:00)    12 Lead ECG:   Ventricular Rate 72 BPM    Atrial Rate 62 BPM    P-R Interval 132 ms    QRS Duration 80 ms    Q-T Interval 418 ms    QTC Calculation(Bazett) 457 ms    P Axis 83 degrees    R Axis -63 degrees    T Axis 63 degrees    Diagnosis Line Sinus rhythm with premature atrial complexes  Pulmonary disease pattern  Left anterior fascicular block  Abnormal ECG  When compared with ECG of 02-SEP-2023 11:31,  premature atrial complexes are now present  Confirmed by ROSALVA GOODRICH ([id number]) on 5/27/2025 6:48:02 PM (05-26-25 @ 17:20)      TRANSTHORACIC ECHOCARDIOGRAM REPORT  ________________________________________________________________________________                                      _______       Pt. Name:       MARTINEZ JONES Study Date:    6/2/2025  MRN:            [id number]        YOB: 1940  Accession #:    [id number]       Age:           84 years  Account#:       [id number]      Gender:        F  Heart Rate:                     Height:        62.99 in (160.00 cm)  Rhythm:                         Weight:        154.32 lb (70.00 kg)  Blood Pressure: 91/55 mmHg      BSA/BMI:       1.73 m² / 27.34 kg/m²  ________________________________________________________________________________________  Referring Physician:    [id number] James Osei  Interpreting Physician: Daniel Jorge M.D.  Primary Sonographer:    Kelsey Monson RDCS    CPT:               ECHO TTE WO CON COMP W DOPP - 22971.m  Indication(s):     Cardiomyopathy, unspecified - I42.9  Procedure:         Transthoracic echocardiogram with 2-D, M-mode and complete                     spectral and color flow Doppler.  Ordering Location: [facility]  Admission Status:  Inpatient    _______________________________________________________________________________________     CONCLUSIONS:      1. Leftventricular systolic function is normal with an ejection fraction of 71 % by Arias's method of disks.   2. Normal right ventricular cavity size, with normal wall thickness, and normal right ventricular systolic function.   3. Left atrium is moderately dilated.   4. Mild mitral regurgitation.   5. Estimated pulmonary artery systolic pressure is 54 mmHg.   6. Mild pulmonic regurgitation.   7. Mild tricuspid regurgitation.   8. Small bilateral pleural effusion noted.   9. There is increased LV mass and concentric hypertrophy.    ________________________________________________________________________________________  FINDINGS:     Left Ventricle:  Left ventricular systolic function is normal with a calculated ejection fraction of 71 % by the Arias's biplane method of disks. Moderate left ventricular hypertrophy. There is increased LV mass and concentric hypertrophy.     Right Ventricle:  The right ventricular cavity is normal in size, with normal wall thickness and right ventricular systolic function is normal.     Left Atrium:  The left atrium is moderately dilated with an indexed volume of 44.98 ml/m².     Right Atrium:  The right atrium is normal in size with an indexed volume of 21.88 ml/m² and an indexed area of 8.72 cm²/m².     Aortic Valve:  The aortic valve is tricuspid with normal leaflet excursion. There is mild thickening of the aortic valve leaflets.     Mitral Valve:  Structurally normal mitral valve with normal leaflet excursion. There is calcification of the mitralvalve annulus. There is mild leaflet calcification. There is mild mitral regurgitation.     Tricuspid Valve:  The tricuspid valve is structurally normal with normal leaflet excursion. There is mild tricuspid regurgitation. Estimated pulmonary artery systolic pressure is 54 mmHg.     Pulmonic Valve:  Structurally normal pulmonic valve with normal leaflet excursion. There is mild pulmonic regurgitation.     Aorta:  The aortic root at the sinuses of Valsalva is normal in size, measuring 3.00 cm (indexed 1.73 cm/m²). The aortic arch diameter is normal in size, measuring 2.4 cm (indexed 1.39 cm/m²).     Pericardium:  There is a trace pericardial effusion.     Pleura:  Small bilateral pleural effusion noted.     Systemic Veins:  The inferior vena cava is normal in size measuring 1.77 cm in diameter, (normal <2.1cm) with abnormal inspiratory collapse (abnormal <50%) consistent with mildly elevated right atrial pressure (~8, range 5-10mmHg).  ____________________________________________________________________  QUANTITATIVE DATA:  Left Ventricle Measurements: (Indexed to BSA)     IVSd (2D):   1.3 cm  LVPWd (2D):  1.2 cm  LVIDd (2D):  4.5 cm  LVIDs (2D):  2.7 cm  LV Mass:     199 g  114.9 g/m²  LV Vol d, MOD A2C: 66.0 ml 38.11 ml/m²  LV Vol d,MOD A4C: 61.7 ml 35.63 ml/m²  LV Vol d, MOD BP:  63.9 ml 36.89 ml/m²  LV Vol s, MOD A2C: 19.4 ml 11.20 ml/m²  LV Vol s, MOD A4C: 16.4 ml 9.47 ml/m²  LV Vol s, MOD BP:  18.2 ml 10.52 ml/m²  LVOT SV MOD BP:    45.7 ml  LV EF% MOD BP:     71 %     MV E Vmax:    1.23 m/s  MV A Vmax:    0.68 m/s  MV E/A:       1.82  e' lateral:   7.51 cm/s  e' medial:    3.05 cm/s  E/e' lateral: 16.38  E/e' medial:  40.33  E/e' Average: 23.30  MV DT:        207 msec    Aorta Measurements: (Normal range) (Indexed to BSA)     Ao Root d 3.00 cm (2.7 - 3.3 cm) 1.73 cm/m²  Ao Arch:  2.4 cm            Left Atrium Measurements: (Indexed to BSA)  LA Diam 2D: 4.10 cm         Right Atrial Measurements:     RA Vol s, MOD A4C         37.9 ml  RA Vol s, MOD A4C i BSA   21.88 ml/m²  RA Area s, MOD A4C        15.1 cm²  RA Area s, MOD A4C, i BSA 8.72 cm²/m²    Mitral Valve Measurements:     MV E Vmax: 1.2 m/s         MR Vmax:          4.19 m/s  MV A Vmax: 0.7 m/s         MR Peak Gradient: 70.2 mmHg  MV E/A:    1.8       Tricuspid Valve Measurements:     TR Vmax:          3.4 m/s  TR Peak Gradient: 45.7 mmHg  RA Pressure:      8 mmHg  PASP:             54 mmHg    ________________________________________________________________________________________  Electronically signedon 6/2/2025 at 1:09:26 PM by Daniel Jorge M.D.         *** Final ***   [facility] -- Sai Valle, Girish Jackson Savella, Goodger: Office # [phone]    Follow Up:  SOB Anemia   Subjective/Observations: Patient seen and examined. Patient alert awake, Denies chest pain palpitation dizziness,  no orthopnea or PND noted. On 2L NC, + difficulty breathing ,      REVIEW OF SYSTEMS: All other review of systems are negative unless indicated above    PAST MEDICAL & SURGICAL HISTORY:  COPD (chronic obstructive pulmonary disease)      DM (diabetes mellitus)  diet-controlled      PAF (paroxysmal atrial fibrillation)  s/p ablation      Hiatal hernia      H/O aplastic anemia      History of IBS      H/O osteoporosis      HLD (hyperlipidemia)      PMR (polymyalgia rheumatica)      History of basal cell cancer      Chronic kidney disease (CKD)      Hypotension      Presence of IVC filter      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      History of immunodeficiency      Lumbar compression fracture      H/O hiatal hernia      H/O pulmonary fibrosis      H/O sinus bradycardia      History of fracture of right hip  "unoperable"      S/P hysterectomy      S/P foot surgery      S/P knee surgery      After cataract  bilateral eye cataract surgically removed      Closed right hip fracture  rigth hip ORIF july 19 2016      S/P IVC filter  nov 2016      S/P carpal tunnel release  Right 2008 & 6/27/17      H/O kyphoplasty      Port-A-Cath in place  right chest          MEDICATIONS  (STANDING):  albuterol    0.083% 2.5 milliGRAM(s) Nebulizer every 6 hours  albuterol    90 MICROgram(s) HFA Inhaler 1 Puff(s) Inhalation every 4 hours  aMIOdarone    Tablet 100 milliGRAM(s) Oral daily  apixaban 2.5 milliGRAM(s) Oral every 12 hours  atorvastatin 10 milliGRAM(s) Oral at bedtime  fluticasone propionate/ salmeterol 500-50 MICROgram(s) Diskus 1 Dose(s) Inhalation two times a day  gabapentin 400 milliGRAM(s) Oral two times a day  leflunomide 10 milliGRAM(s) Oral daily  midodrine. 10 milliGRAM(s) Oral every 8 hours  montelukast 10 milliGRAM(s) Oral daily  pantoprazole    Tablet 40 milliGRAM(s) Oral before breakfast  predniSONE   Tablet 40 milliGRAM(s) Oral daily  sodium chloride 3%  Inhalation 4 milliLiter(s) Inhalation every 12 hours  tiotropium 2.5 MICROgram(s) Inhaler 2 Puff(s) Inhalation daily  tiZANidine 2 milliGRAM(s) Oral at bedtime  torsemide 20 milliGRAM(s) Oral daily    MEDICATIONS  (PRN):  albuterol/ipratropium for Nebulization 3 milliLiter(s) Nebulizer every 6 hours PRN Shortness of Breath and/or Wheezing  dicyclomine 10 milliGRAM(s) Oral two times a day before meals PRN bowel irritability  melatonin 3 milliGRAM(s) Oral at bedtime PRN Insomnia    Allergies    No Known Allergies    Intolerances      Vital Signs Last 24 Hrs  T(C): 36.8 (04 Jun 2025 04:30), Max: 36.8 (03 Jun 2025 11:45)  T(F): 98.3 (04 Jun 2025 04:30), Max: 98.3 (04 Jun 2025 04:30)  HR: 54 (04 Jun 2025 07:43) (54 - 72)  BP: 168/65 (04 Jun 2025 04:30) (106/58 - 168/65)  BP(mean): --  RR: 18 (04 Jun 2025 04:30) (18 - 18)  SpO2: 100% (04 Jun 2025 07:43) (95% - 100%)    Parameters below as of 04 Jun 2025 07:43  Patient On (Oxygen Delivery Method): 2LNC      I&O's Summary    03 Jun 2025 07:01  -  04 Jun 2025 07:00  --------------------------------------------------------  IN: 960 mL / OUT: 2700 mL / NET: -1740 mL          TELE: SB/ NSR 50- 60, ST 120s  PHYSICAL EXAM:  Constitutional: NAD, awake and alert,   HEENT: Moist Mucous Membranes, Anicteric  Pulmonary: + mild labour breathing b/l lung  diminished  BS at bases, No wheezing, rales or rhonchi  Cardiovascular: Regular, S1 and S2, No murmurs, No rubs, gallops or clicks  Gastrointestinal:  soft, nontender, nondistended   Lymph: No peripheral edema. No lymphadenopathy.   Skin: No visible rashes or ulcers.  Psych:  Mood & affect appropriate      LABS: All Labs Reviewed:                        8.5    5.15  )-----------( 142      ( 04 Jun 2025 07:02 )             26.6                         8.5    4.81  )-----------( 116      ( 03 Jun 2025 06:10 )             25.9                         7.7    x     )-----------( x        ( 02 Jun 2025 09:55 )             24.2     04 Jun 2025 07:02    140    |  100    |  67     ----------------------------<  88     5.2     |  34     |  1.60   03 Jun 2025 06:10    139    |  100    |  68     ----------------------------<  90     5.1     |  33     |  1.30   02 Jun 2025 06:05    137    |  97     |  71     ----------------------------<  94     4.4     |  33     |  1.50     Ca    9.3        04 Jun 2025 07:02  Ca    9.3        03 Jun 2025 06:10  Ca    9.3        02 Jun 2025 06:05    TPro  5.7    /  Alb  3.6    /  TBili  1.0    /  DBili  x      /  AST  8      /  ALT  26     /  AlkPhos  30     02 Jun 2025 06:05     Troponin I, High Sensitivity Result: 15.2 ng/L (05-26-25 @ 18:00)    12 Lead ECG:   Ventricular Rate 72 BPM    Atrial Rate 62 BPM    P-R Interval 132 ms    QRS Duration 80 ms    Q-T Interval 418 ms    QTC Calculation(Bazett) 457 ms    P Axis 83 degrees    R Axis -63 degrees    T Axis 63 degrees    Diagnosis Line Sinus rhythm with premature atrial complexes  Pulmonary disease pattern  Left anterior fascicular block  Abnormal ECG  When compared with ECG of 02-SEP-2023 11:31,  premature atrial complexes are now present  Confirmed by ROSALVA GOODRICH ([id number]) on 5/27/2025 6:48:02 PM (05-26-25 @ 17:20)      TRANSTHORACIC ECHOCARDIOGRAM REPORT  ________________________________________________________________________________                                      _______       Pt. Name:       MARTINEZ JNOES Study Date:    6/2/2025  MRN:            [id number]        YOB: 1940  Accession #:    [id number]       Age:           84 years  Account#:       [id number]      Gender:        F  Heart Rate:                     Height:        62.99 in (160.00 cm)  Rhythm:                         Weight:        154.32 lb (70.00 kg)  Blood Pressure: 91/55 mmHg      BSA/BMI:       1.73 m² / 27.34 kg/m²  ________________________________________________________________________________________  Referring Physician:    [id number] James Osei  Interpreting Physician: Daniel Jorge M.D.  Primary Sonographer:    Kelsey Monson RDCS    CPT:               ECHO TTE WO CON COMP W DOPP - 70343.m  Indication(s):     Cardiomyopathy, unspecified - I42.9  Procedure:         Transthoracic echocardiogram with 2-D, M-mode and complete                     spectral and color flow Doppler.  Ordering Location: [facility]  Admission Status:  Inpatient    _______________________________________________________________________________________     CONCLUSIONS:      1. Leftventricular systolic function is normal with an ejection fraction of 71 % by Arias's method of disks.   2. Normal right ventricular cavity size, with normal wall thickness, and normal right ventricular systolic function.   3. Left atrium is moderately dilated.   4. Mild mitral regurgitation.   5. Estimated pulmonary artery systolic pressure is 54 mmHg.   6. Mild pulmonic regurgitation.   7. Mild tricuspid regurgitation.   8. Small bilateral pleural effusion noted.   9. There is increased LV mass and concentric hypertrophy.    ________________________________________________________________________________________  FINDINGS:     Left Ventricle:  Left ventricular systolic function is normal with a calculated ejection fraction of 71 % by the Arias's biplane method of disks. Moderate left ventricular hypertrophy. There is increased LV mass and concentric hypertrophy.     Right Ventricle:  The right ventricular cavity is normal in size, with normal wall thickness and right ventricular systolic function is normal.     Left Atrium:  The left atrium is moderately dilated with an indexed volume of 44.98 ml/m².     Right Atrium:  The right atrium is normal in size with an indexed volume of 21.88 ml/m² and an indexed area of 8.72 cm²/m².     Aortic Valve:  The aortic valve is tricuspid with normal leaflet excursion. There is mild thickening of the aortic valve leaflets.     Mitral Valve:  Structurally normal mitral valve with normal leaflet excursion. There is calcification of the mitralvalve annulus. There is mild leaflet calcification. There is mild mitral regurgitation.     Tricuspid Valve:  The tricuspid valve is structurally normal with normal leaflet excursion. There is mild tricuspid regurgitation. Estimated pulmonary artery systolic pressure is 54 mmHg.     Pulmonic Valve:  Structurally normal pulmonic valve with normal leaflet excursion. There is mild pulmonic regurgitation.     Aorta:  The aortic root at the sinuses of Valsalva is normal in size, measuring 3.00 cm (indexed 1.73 cm/m²). The aortic arch diameter is normal in size, measuring 2.4 cm (indexed 1.39 cm/m²).     Pericardium:  There is a trace pericardial effusion.     Pleura:  Small bilateral pleural effusion noted.     Systemic Veins:  The inferior vena cava is normal in size measuring 1.77 cm in diameter, (normal <2.1cm) with abnormal inspiratory collapse (abnormal <50%) consistent with mildly elevated right atrial pressure (~8, range 5-10mmHg).  ____________________________________________________________________  QUANTITATIVE DATA:  Left Ventricle Measurements: (Indexed to BSA)     IVSd (2D):   1.3 cm  LVPWd (2D):  1.2 cm  LVIDd (2D):  4.5 cm  LVIDs (2D):  2.7 cm  LV Mass:     199 g  114.9 g/m²  LV Vol d, MOD A2C: 66.0 ml 38.11 ml/m²  LV Vol d,MOD A4C: 61.7 ml 35.63 ml/m²  LV Vol d, MOD BP:  63.9 ml 36.89 ml/m²  LV Vol s, MOD A2C: 19.4 ml 11.20 ml/m²  LV Vol s, MOD A4C: 16.4 ml 9.47 ml/m²  LV Vol s, MOD BP:  18.2 ml 10.52 ml/m²  LVOT SV MOD BP:    45.7 ml  LV EF% MOD BP:     71 %     MV E Vmax:    1.23 m/s  MV A Vmax:    0.68 m/s  MV E/A:       1.82  e' lateral:   7.51 cm/s  e' medial:    3.05 cm/s  E/e' lateral: 16.38  E/e' medial:  40.33  E/e' Average: 23.30  MV DT:        207 msec    Aorta Measurements: (Normal range) (Indexed to BSA)     Ao Root d 3.00 cm (2.7 - 3.3 cm) 1.73 cm/m²  Ao Arch:  2.4 cm            Left Atrium Measurements: (Indexed to BSA)  LA Diam 2D: 4.10 cm         Right Atrial Measurements:     RA Vol s, MOD A4C         37.9 ml  RA Vol s, MOD A4C i BSA   21.88 ml/m²  RA Area s, MOD A4C        15.1 cm²  RA Area s, MOD A4C, i BSA 8.72 cm²/m²    Mitral Valve Measurements:     MV E Vmax: 1.2 m/s         MR Vmax:          4.19 m/s  MV A Vmax: 0.7 m/s         MR Peak Gradient: 70.2 mmHg  MV E/A:    1.8       Tricuspid Valve Measurements:     TR Vmax:          3.4 m/s  TR Peak Gradient: 45.7 mmHg  RA Pressure:      8 mmHg  PASP:             54 mmHg    ________________________________________________________________________________________  Electronically signedon 6/2/2025 at 1:09:26 PM by Daniel Jorge M.D.         *** Final ***

## 2025-06-04 NOTE — PROGRESS NOTE ADULT - SUBJECTIVE AND OBJECTIVE BOX
[facility]                                                       Dr. Chisholm, Dr. Franklin, Dr. Baron, Dr. Clark, Dr. Stock                                      [facility]– [place][note 2], [facility], [address]                                                 [address] [place] [address]                                      Ph: [phone]  Fax: [phone]                                         Ph: [phone]  Fax: [phone]      Patient is a 84y old  Female who presents with a chief complaint of Shortness of breath (02 Jun 2025 14:05)  Patient seen in follow up for CKD 3.        PAST MEDICAL HISTORY:  COPD (chronic obstructive pulmonary disease)    DM (diabetes mellitus)    Pulmonary fibrosis    PAF (paroxysmal atrial fibrillation)    Hiatal hernia    GIB (gastrointestinal bleeding)    Pericarditis    Shoulder fracture, right    Hematoma    H/O aplastic anemia    History of IBS    H/O osteoporosis    HLD (hyperlipidemia)    PMR (polymyalgia rheumatica)    History of basal cell cancer    Chronic kidney disease (CKD)    Hypotension    Presence of IVC filter    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    History of immunodeficiency    Lumbar compression fracture    H/O hiatal hernia    H/O pulmonary fibrosis    H/O sinus bradycardia    History of fracture of right hip      MEDICATIONS  (STANDING):  albuterol    0.083% 2.5 milliGRAM(s) Nebulizer every 6 hours  albuterol    90 MICROgram(s) HFA Inhaler 1 Puff(s) Inhalation every 4 hours  aMIOdarone    Tablet 100 milliGRAM(s) Oral daily  apixaban 2.5 milliGRAM(s) Oral every 12 hours  atorvastatin 10 milliGRAM(s) Oral at bedtime  fluticasone propionate/ salmeterol 500-50 MICROgram(s) Diskus 1 Dose(s) Inhalation two times a day  gabapentin 400 milliGRAM(s) Oral two times a day  leflunomide 10 milliGRAM(s) Oral daily  midodrine. 10 milliGRAM(s) Oral every 8 hours  montelukast 10 milliGRAM(s) Oral daily  pantoprazole    Tablet 40 milliGRAM(s) Oral before breakfast  predniSONE   Tablet 40 milliGRAM(s) Oral daily  sodium chloride 3%  Inhalation 4 milliLiter(s) Inhalation every 12 hours  tiotropium 2.5 MICROgram(s) Inhaler 2 Puff(s) Inhalation daily  tiZANidine 2 milliGRAM(s) Oral at bedtime  torsemide 20 milliGRAM(s) Oral daily    MEDICATIONS  (PRN):  albuterol/ipratropium for Nebulization 3 milliLiter(s) Nebulizer every 6 hours PRN Shortness of Breath and/or Wheezing  dicyclomine 10 milliGRAM(s) Oral two times a day before meals PRN bowel irritability  melatonin 3 milliGRAM(s) Oral at bedtime PRN Insomnia    T(C): 36.8 (06-04-25 @ 04:30), Max: 37.1 (06-02-25 @ 20:05)  HR: 54 (06-04-25 @ 07:43) (54 - 92)  BP: 168/65 (06-04-25 @ 04:30) (104/53 - 168/65)  RR: 18 (06-04-25 @ 04:30)  SpO2: 100% (06-04-25 @ 07:43)  Wt(kg): --  I&O's Detail    03 Jun 2025 07:01  -  04 Jun 2025 07:00  --------------------------------------------------------  IN:    Oral Fluid: 960 mL  Total IN: 960 mL    OUT:    Voided (mL): 2700 mL  Total OUT: 2700 mL    Total NET: -1740 mL                PHYSICAL EXAM:  General: No distress  Respiratory: b/l air entry  Cardiovascular: S1 S2  Gastrointestinal: soft  Extremities:  edema        LABORATORY:                        8.5    5.15  )-----------( 142      ( 04 Jun 2025 07:02 )             26.6     06-04    140  |  100  |  67[H]  ----------------------------<  88  5.2   |  34[H]  |  1.60[H]    Ca    9.3      04 Jun 2025 07:02      Sodium: 140 mmol/L (06-04 @ 07:02)  Sodium: 139 mmol/L (06-03 @ 06:10)    Potassium: 5.2 mmol/L (06-04 @ 07:02)  Potassium: 5.1 mmol/L (06-03 @ 06:10)    Hemoglobin: 8.5 g/dL (06-04 @ 07:02)  Hemoglobin: 8.5 g/dL (06-03 @ 06:10)  Hemoglobin: 7.7 g/dL (06-02 @ 09:55)  Hemoglobin: 7.6 g/dL (06-02 @ 06:05)    Creatinine, Serum 1.60 (06-04 @ 07:02)  Creatinine, Serum 1.30 (06-03 @ 06:10)  Creatinine, Serum 1.50 (06-02 @ 06:05)          Urinalysis Basic - ( 04 Jun 2025 07:02 )    Color: x / Appearance: x / SG: x / pH: x  Gluc: 88 mg/dL / Ketone: x  / Bili: x / Urobili: x   Blood: x / Protein: x / Nitrite: x   Leuk Esterase: x / RBC: x / WBC x   Sq Epi: x / Non Sq Epi: x / Bacteria: x

## 2025-06-05 VITALS
RESPIRATION RATE: 18 BRPM | TEMPERATURE: 98 F | OXYGEN SATURATION: 100 % | DIASTOLIC BLOOD PRESSURE: 666 MMHG | HEART RATE: 51 BPM | SYSTOLIC BLOOD PRESSURE: 114 MMHG

## 2025-06-05 LAB
ANION GAP SERPL CALC-SCNC: 5 MMOL/L — SIGNIFICANT CHANGE UP (ref 5–17)
BUN SERPL-MCNC: 66 MG/DL — HIGH (ref 7–23)
CALCIUM SERPL-MCNC: 9.8 MG/DL — SIGNIFICANT CHANGE UP (ref 8.5–10.1)
CHLORIDE SERPL-SCNC: 101 MMOL/L — SIGNIFICANT CHANGE UP (ref 96–108)
CO2 SERPL-SCNC: 33 MMOL/L — HIGH (ref 22–31)
CREAT SERPL-MCNC: 1.4 MG/DL — HIGH (ref 0.5–1.3)
EGFR: 38 ML/MIN/1.73M2 — LOW
EGFR: 38 ML/MIN/1.73M2 — LOW
GLUCOSE SERPL-MCNC: 108 MG/DL — HIGH (ref 70–99)
HCT VFR BLD CALC: 27.5 % — LOW (ref 34.5–45)
HGB BLD-MCNC: 8.8 G/DL — LOW (ref 11.5–15.5)
MAGNESIUM SERPL-MCNC: 1.8 MG/DL — SIGNIFICANT CHANGE UP (ref 1.6–2.6)
MCHC RBC-ENTMCNC: 31.3 PG — SIGNIFICANT CHANGE UP (ref 27–34)
MCHC RBC-ENTMCNC: 32 G/DL — SIGNIFICANT CHANGE UP (ref 32–36)
MCV RBC AUTO: 97.9 FL — SIGNIFICANT CHANGE UP (ref 80–100)
NRBC BLD AUTO-RTO: 0 /100 WBCS — SIGNIFICANT CHANGE UP (ref 0–0)
PLATELET # BLD AUTO: 186 K/UL — SIGNIFICANT CHANGE UP (ref 150–400)
POTASSIUM SERPL-MCNC: 5 MMOL/L — SIGNIFICANT CHANGE UP (ref 3.5–5.3)
POTASSIUM SERPL-SCNC: 5 MMOL/L — SIGNIFICANT CHANGE UP (ref 3.5–5.3)
RBC # BLD: 2.81 M/UL — LOW (ref 3.8–5.2)
RBC # FLD: 22.5 % — HIGH (ref 10.3–14.5)
SODIUM SERPL-SCNC: 139 MMOL/L — SIGNIFICANT CHANGE UP (ref 135–145)
WBC # BLD: 5.23 K/UL — SIGNIFICANT CHANGE UP (ref 3.8–10.5)
WBC # FLD AUTO: 5.23 K/UL — SIGNIFICANT CHANGE UP (ref 3.8–10.5)

## 2025-06-05 PROCEDURE — 99233 SBSQ HOSP IP/OBS HIGH 50: CPT

## 2025-06-05 PROCEDURE — 83935 ASSAY OF URINE OSMOLALITY: CPT

## 2025-06-05 PROCEDURE — 86900 BLOOD TYPING SEROLOGIC ABO: CPT

## 2025-06-05 PROCEDURE — 84300 ASSAY OF URINE SODIUM: CPT

## 2025-06-05 PROCEDURE — 85379 FIBRIN DEGRADATION QUANT: CPT

## 2025-06-05 PROCEDURE — 84100 ASSAY OF PHOSPHORUS: CPT

## 2025-06-05 PROCEDURE — 84145 PROCALCITONIN (PCT): CPT

## 2025-06-05 PROCEDURE — 94640 AIRWAY INHALATION TREATMENT: CPT

## 2025-06-05 PROCEDURE — 96374 THER/PROPH/DIAG INJ IV PUSH: CPT

## 2025-06-05 PROCEDURE — 36600 WITHDRAWAL OF ARTERIAL BLOOD: CPT

## 2025-06-05 PROCEDURE — 85014 HEMATOCRIT: CPT

## 2025-06-05 PROCEDURE — 36430 TRANSFUSION BLD/BLD COMPNT: CPT

## 2025-06-05 PROCEDURE — P9047: CPT

## 2025-06-05 PROCEDURE — 97116 GAIT TRAINING THERAPY: CPT

## 2025-06-05 PROCEDURE — 71045 X-RAY EXAM CHEST 1 VIEW: CPT

## 2025-06-05 PROCEDURE — 0225U NFCT DS DNA&RNA 21 SARSCOV2: CPT

## 2025-06-05 PROCEDURE — 93970 EXTREMITY STUDY: CPT

## 2025-06-05 PROCEDURE — 85027 COMPLETE CBC AUTOMATED: CPT

## 2025-06-05 PROCEDURE — 97162 PT EVAL MOD COMPLEX 30 MIN: CPT

## 2025-06-05 PROCEDURE — 94660 CPAP INITIATION&MGMT: CPT

## 2025-06-05 PROCEDURE — 84133 ASSAY OF URINE POTASSIUM: CPT

## 2025-06-05 PROCEDURE — 80048 BASIC METABOLIC PNL TOTAL CA: CPT

## 2025-06-05 PROCEDURE — 84132 ASSAY OF SERUM POTASSIUM: CPT

## 2025-06-05 PROCEDURE — 36415 COLL VENOUS BLD VENIPUNCTURE: CPT

## 2025-06-05 PROCEDURE — 82803 BLOOD GASES ANY COMBINATION: CPT

## 2025-06-05 PROCEDURE — 99285 EMERGENCY DEPT VISIT HI MDM: CPT | Mod: 25

## 2025-06-05 PROCEDURE — 85018 HEMOGLOBIN: CPT

## 2025-06-05 PROCEDURE — P9040: CPT

## 2025-06-05 PROCEDURE — 93306 TTE W/DOPPLER COMPLETE: CPT

## 2025-06-05 PROCEDURE — 83880 ASSAY OF NATRIURETIC PEPTIDE: CPT

## 2025-06-05 PROCEDURE — 97530 THERAPEUTIC ACTIVITIES: CPT

## 2025-06-05 PROCEDURE — 82746 ASSAY OF FOLIC ACID SERUM: CPT

## 2025-06-05 PROCEDURE — 84156 ASSAY OF PROTEIN URINE: CPT

## 2025-06-05 PROCEDURE — 81001 URINALYSIS AUTO W/SCOPE: CPT

## 2025-06-05 PROCEDURE — 93005 ELECTROCARDIOGRAM TRACING: CPT

## 2025-06-05 PROCEDURE — 86850 RBC ANTIBODY SCREEN: CPT

## 2025-06-05 PROCEDURE — 99239 HOSP IP/OBS DSCHRG MGMT >30: CPT

## 2025-06-05 PROCEDURE — 86901 BLOOD TYPING SEROLOGIC RH(D): CPT

## 2025-06-05 PROCEDURE — 71250 CT THORAX DX C-: CPT

## 2025-06-05 PROCEDURE — 76770 US EXAM ABDO BACK WALL COMP: CPT

## 2025-06-05 PROCEDURE — 82570 ASSAY OF URINE CREATININE: CPT

## 2025-06-05 PROCEDURE — 86923 COMPATIBILITY TEST ELECTRIC: CPT

## 2025-06-05 PROCEDURE — 82607 VITAMIN B-12: CPT

## 2025-06-05 PROCEDURE — 94760 N-INVAS EAR/PLS OXIMETRY 1: CPT

## 2025-06-05 PROCEDURE — 80053 COMPREHEN METABOLIC PANEL: CPT

## 2025-06-05 PROCEDURE — 71045 X-RAY EXAM CHEST 1 VIEW: CPT | Mod: 26

## 2025-06-05 PROCEDURE — 83735 ASSAY OF MAGNESIUM: CPT

## 2025-06-05 PROCEDURE — 84540 ASSAY OF URINE/UREA-N: CPT

## 2025-06-05 PROCEDURE — 85025 COMPLETE CBC W/AUTO DIFF WBC: CPT

## 2025-06-05 PROCEDURE — 84484 ASSAY OF TROPONIN QUANT: CPT

## 2025-06-05 RX ORDER — MIDODRINE HYDROCHLORIDE 5 MG/1
2 TABLET ORAL
Refills: 0 | DISCHARGE

## 2025-06-05 RX ORDER — IPRATROPIUM BROMIDE AND ALBUTEROL SULFATE .5; 2.5 MG/3ML; MG/3ML
3 SOLUTION RESPIRATORY (INHALATION)
Qty: 0 | Refills: 0 | DISCHARGE
Start: 2025-06-05

## 2025-06-05 RX ORDER — FUROSEMIDE 10 MG/ML
40 INJECTION INTRAMUSCULAR; INTRAVENOUS ONCE
Refills: 0 | Status: COMPLETED | OUTPATIENT
Start: 2025-06-05 | End: 2025-06-05

## 2025-06-05 RX ORDER — TIOTROPIUM BROMIDE INHALATION SPRAY 3.12 UG/1
2 SPRAY, METERED RESPIRATORY (INHALATION)
Qty: 0 | Refills: 0 | DISCHARGE
Start: 2025-06-05

## 2025-06-05 RX ORDER — MIDODRINE HYDROCHLORIDE 5 MG/1
1 TABLET ORAL
Qty: 0 | Refills: 0 | DISCHARGE
Start: 2025-06-05

## 2025-06-05 RX ADMIN — Medication 40 MILLIGRAM(S): at 05:29

## 2025-06-05 RX ADMIN — APIXABAN 2.5 MILLIGRAM(S): 2.5 TABLET, FILM COATED ORAL at 17:09

## 2025-06-05 RX ADMIN — Medication 2.5 MILLIGRAM(S): at 14:11

## 2025-06-05 RX ADMIN — LEFLUNOMIDE 10 MILLIGRAM(S): 10 TABLET ORAL at 17:11

## 2025-06-05 RX ADMIN — Medication 1 DOSE(S): at 18:33

## 2025-06-05 RX ADMIN — FUROSEMIDE 40 MILLIGRAM(S): 10 INJECTION INTRAMUSCULAR; INTRAVENOUS at 11:01

## 2025-06-05 RX ADMIN — AMIODARONE HYDROCHLORIDE 100 MILLIGRAM(S): 50 INJECTION, SOLUTION INTRAVENOUS at 05:29

## 2025-06-05 RX ADMIN — Medication 2.5 MILLIGRAM(S): at 07:34

## 2025-06-05 RX ADMIN — GABAPENTIN 400 MILLIGRAM(S): 400 CAPSULE ORAL at 05:29

## 2025-06-05 RX ADMIN — PREDNISONE 40 MILLIGRAM(S): 20 TABLET ORAL at 05:30

## 2025-06-05 RX ADMIN — MONTELUKAST SODIUM 10 MILLIGRAM(S): 10 TABLET ORAL at 11:17

## 2025-06-05 RX ADMIN — MIDODRINE HYDROCHLORIDE 10 MILLIGRAM(S): 5 TABLET ORAL at 05:29

## 2025-06-05 RX ADMIN — Medication 20 MILLIGRAM(S): at 05:29

## 2025-06-05 RX ADMIN — APIXABAN 2.5 MILLIGRAM(S): 2.5 TABLET, FILM COATED ORAL at 05:29

## 2025-06-05 RX ADMIN — MIDODRINE HYDROCHLORIDE 10 MILLIGRAM(S): 5 TABLET ORAL at 15:10

## 2025-06-05 RX ADMIN — Medication 1 DOSE(S): at 05:30

## 2025-06-05 RX ADMIN — GABAPENTIN 400 MILLIGRAM(S): 400 CAPSULE ORAL at 17:09

## 2025-06-05 RX ADMIN — Medication 4 MILLILITER(S): at 07:34

## 2025-06-05 RX ADMIN — TIOTROPIUM BROMIDE INHALATION SPRAY 2 PUFF(S): 3.12 SPRAY, METERED RESPIRATORY (INHALATION) at 05:30

## 2025-06-05 NOTE — PROGRESS NOTE ADULT - PROBLEM SELECTOR PROBLEM 2
Myelodysplasia, low grade
Myelodysplasia, low grade
Chronic obstructive pulmonary disease (COPD)
Myelodysplasia, low grade
Myelodysplasia, low grade
Chronic obstructive pulmonary disease (COPD)

## 2025-06-05 NOTE — PROGRESS NOTE ADULT - PROBLEM SELECTOR PROBLEM 1
Acute hypoxic respiratory failure
Myelodysplasia, high grade
Acute hypoxic respiratory failure

## 2025-06-05 NOTE — PROGRESS NOTE ADULT - PROVIDER SPECIALTY LIST ADULT
Cardiology
Cardiology
Nephrology
Nephrology
Nephrology-Cardiorenal
Pulmonology
Pulmonology
Cardiology
Hospitalist
Nephrology
Nephrology
Pulmonology
Cardiology
Cardiology
Heme/Onc
Heme/Onc
Hospitalist
Pulmonology
Cardiology
Heme/Onc
Heme/Onc
Hospitalist

## 2025-06-05 NOTE — CONSULT NOTE ADULT - SUBJECTIVE AND OBJECTIVE BOX
[facility]    Luc Ronny Durant NP    [address] [place] [address] [phone]      Chief Complaint:  Patient is a 84y old  Female who presents with a chief complaint of Shortness of breath (2025 11:31)      HPI:83 y/o F with PMH AFon Eliquis, COPD (not on home o2), CKD, aplastic anemia, PMR (chronic prednisone with AVN hip), HLD, HTN, DM who p/w worsening SOB over the past two days, denies CP/ palpitations, has home O2 setup (but doesn't use) but put it on after noticing SpO2 in the 80's, which improved SOB. Endorses worsening SWAIN, denies significant wheezing, cough, recent illness med changes, or changes in diet. Adherent with home meds.    Allergies:  No Known Allergies      Medications:  albuterol    0.083% 2.5 milliGRAM(s) Nebulizer every 6 hours  albuterol    90 MICROgram(s) HFA Inhaler 1 Puff(s) Inhalation every 4 hours  albuterol/ipratropium for Nebulization 3 milliLiter(s) Nebulizer every 6 hours PRN  aMIOdarone    Tablet 100 milliGRAM(s) Oral daily  apixaban 2.5 milliGRAM(s) Oral every 12 hours  atorvastatin 10 milliGRAM(s) Oral at bedtime  chlorhexidine 2% Cloths 1 Application(s) Topical daily  dicyclomine 10 milliGRAM(s) Oral two times a day before meals PRN  fluticasone propionate/ salmeterol 500-50 MICROgram(s) Diskus 1 Dose(s) Inhalation two times a day  gabapentin 400 milliGRAM(s) Oral two times a day  leflunomide 10 milliGRAM(s) Oral daily  melatonin 3 milliGRAM(s) Oral at bedtime PRN  midodrine. 10 milliGRAM(s) Oral every 8 hours  montelukast 10 milliGRAM(s) Oral daily  pantoprazole    Tablet 40 milliGRAM(s) Oral before breakfast  predniSONE   Tablet 40 milliGRAM(s) Oral daily  sodium chloride 3%  Inhalation 4 milliLiter(s) Inhalation every 12 hours  tiotropium 2.5 MICROgram(s) Inhaler 2 Puff(s) Inhalation daily  tiZANidine 2 milliGRAM(s) Oral at bedtime  torsemide 20 milliGRAM(s) Oral daily      PMHX/PSHX:  COPD (chronic obstructive pulmonary disease)    DM (diabetes mellitus)    Pulmonary fibrosis    PAF (paroxysmal atrial fibrillation)    Hiatal hernia    GIB (gastrointestinal bleeding)    Pericarditis    Shoulder fracture, right    Hematoma    H/O aplastic anemia    History of IBS    H/O osteoporosis    HLD (hyperlipidemia)    PMR (polymyalgia rheumatica)    History of basal cell cancer    Chronic kidney disease (CKD)    Hypotension    Presence of IVC filter    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    History of immunodeficiency    Lumbar compression fracture    H/O hiatal hernia    H/O pulmonary fibrosis    H/O sinus bradycardia    History of fracture of right hip    S/P hysterectomy    S/P foot surgery    S/P knee surgery    After cataract    Closed right hip fracture    S/P IVC filter    S/P carpal tunnel release    H/O kyphoplasty    Port-A-Cath in place        Family history:  Family history of bladder cancer (Mother)    Family history of myocardial infarction (Father)    FH: atrial fibrillation (Father)        Social History:     ROS:     General:  No wt loss, fevers, chills, night sweats, fatigue,   Eyes:  Good vision, no reported pain  ENT:  No sore throat, pain, runny nose, dysphagia  CV:  No pain, palpitations, hypo/hypertension  Resp:  No dyspnea, cough, tachypnea, wheezing  GI:  No pain, No nausea, No vomiting, No diarrhea, No constipation, No weight loss, No fever, No pruritis, No rectal bleeding, No tarry stools, No dysphagia,  :  No pain, bleeding, incontinence, nocturia  Muscle:  No pain, weakness  Neuro:  No weakness, tingling, memory problems  Psych:  No fatigue, insomnia, mood problems, depression  Endocrine:  No polyuria, polydipsia, cold/heat intolerance  Heme:  No petechiae, ecchymosis, easy bruisability  Skin:  No rash, tattoos, scars, edema      PHYSICAL EXAM:   Vital Signs:  Vital Signs Last 24 Hrs  T(C): 37.1 (2025 10:53), Max: 37.1 (2025 08:45)  T(F): 98.7 (2025 10:53), Max: 98.7 (2025 08:45)  HR: 58 (2025 10:53) (56 - 72)  BP: 103/65 (2025 10:53) (103/65 - 111/57)  BP(mean): --  RR: 18 (2025 10:53) (18 - 18)  SpO2: 100% (2025 10:53) (97% - 100%)    Parameters below as of 2025 10:53  Patient On (Oxygen Delivery Method): nasal cannula  O2 Flow (L/min): 2    Daily     Daily Weight in k.6 (2025 05:00)    GENERAL:  Appears stated age, well-groomed, well-nourished, no distress  HEENT:  NC/AT,  conjunctivae clear and pink, no thyromegaly, nodules, adenopathy, no JVD, sclera -anicteric  CHEST:  Full & symmetric excursion, no increased effort, breath sounds clear  HEART:  Regular rhythm, S1, S2, no murmur/rub/S3/S4, no abdominal bruit, no edema  ABDOMEN:  Soft, non-tender, non-distended, normoactive bowel sounds,  no masses ,no hepato-splenomegaly, no signs of chronic liver disease  EXTEREMITIES:  no cyanosis,clubbing or edema  SKIN:  No rash/erythema/ecchymoses/petechiae/wounds/abscess/warm/dry  NEURO:  Alert, oriented, no asterixis, no tremor, no encephalopathy    LABS:                        8.8    5.23  )-----------( 186      ( 2025 08:00 )             27.5     06-05    139  |  101  |  66[H]  ----------------------------<  108[H]  5.0   |  33[H]  |  1.40[H]    Ca    9.8      2025 08:00  Mg     1.8     06-05          Urinalysis Basic - ( 2025 08:00 )    Color: x / Appearance: x / SG: x / pH: x  Gluc: 108 mg/dL / Ketone: x  / Bili: x / Urobili: x   Blood: x / Protein: x / Nitrite: x   Leuk Esterase: x / RBC: x / WBC x   Sq Epi: x / Non Sq Epi: x / Bacteria: x          Imaging:

## 2025-06-05 NOTE — PROGRESS NOTE ADULT - ASSESSMENT
84F Hx HTN, HLD, DM, PAF, CKD, COPD (not on home o2) PMR (chronic prednisone with AVN hip),aplastic anemia presented w/ worsening SOB and b/l LE edema; admitted for likely HFpEF exacerbation     p/w shortness of breath, multifactorial in nature, seemed mostly likely secondary to a COPD  - chronic lower extremity edema resolved    - S/p Lasix 40mg IV BID, and remains on Torsemide 20 daily    - Repeat TTE normal LV and RV size and function, EF 71%, PASP 54, increased LV mass and hypertrophy   - CXR 6/4 shows increase b/l pleural effsuion  + Anemia, transfusing per primary     - No evidence of any active ischemia  - Continue statin      - Known Afib   - Continue Eliquis, Amiodarone     - BP soft   - Continue Midodrine.     - Monitor and replete lytes, keep K>4, Mg>2.  - Will continue to follow.   84F Hx HTN, HLD, DM, PAF, CKD, COPD (not on home o2) PMR (chronic prednisone with AVN hip),aplastic anemia presented w/ worsening SOB and b/l LE edema; admitted for likely HFpEF exacerbation     p/w shortness of breath, multifactorial in nature, seemed mostly likely secondary to a COPD  - chronic lower extremity edema resolved    - Repeat TTE normal LV and RV size and function, EF 71%, PASP 54, increased LV mass and hypertrophy   - CXR 6/4 shows increase b/l pleural effsuion  -repeat chest xray this am pending  -give LAsix 40mg IV now   -fu pulmonary recs  + Anemia, transfusing per primary     - No evidence of any active ischemia  - Continue statin      - Known Afib   - Continue Eliquis, Amiodarone     - BP soft at times  - Continue Midodrine.     - Monitor and replete lytes, keep K>4, Mg>2.  - Will continue to follow.

## 2025-06-05 NOTE — CONSULT NOTE ADULT - ASSESSMENT
rectal bleed    suspect bleed 2/2 hemmoroids  hgb stable  cont reg diet  anusol suppository if bleeding persist   outpatient follow up as needed  d/w primary

## 2025-06-05 NOTE — PROGRESS NOTE ADULT - ASSESSMENT
REASON FOR VISIT  .. Management of problems listed below      EVENTS/CURRENT ISSUES.  .... 6/2/2025 pt cannot tolerate bpap   .... 5/30/2025 abg shows metabolic alkalosis and resp acidosis noct bpap ordered   .... 5/29/2025 pred changed solumed   .... 5/28/2025 lasix changed to torsemide   .... 5/27/2025 being rxed for copd chf   .... 5/27/2025 85 y/o F with PMH AFon Eliquis, COPD (not on home o2), CKD, aplastic anemia, PMR (chronic prednisone with AVN hip aw sob wheeze         REVIEW OF SYMPTOMS   Able to give ROS  Yes     RELIABILITY +/-   CONSTITUTIONAL Weakness Yes    ENDOCRINE  No heat or cold intolerance    ALLERGY No hives  Sore throat No stridor  RESP Shortness of breath YES   NEURO New weakness No   CARDIAC   Palpitations No         PHYSICAL EXAM    HEENT Unremarkable  atraumatic   RESP Fair air entry  Harsh breath sound   CARDIAC S1 S2 No S3     NO JVD    ABDOMEN No hepatosplenomegaly   PEDAL EDEMA present No calf tenderness  NO rash     REASON FOR VISIT  .. Management of problems listed below      CC.   . 5/26/2025 c/o shortness of breath- was 87% on room air- patient was wheezing- poatient given 1 albuterol treatment by ems- on 4 litres patient saturating at 96%  shortness of breath  OVERALL PRESENTATION.  . 5/26/2025 85 y/o F with PMH AFon Eliquis, COPD (not on home o2), CKD, aplastic anemia, PMR (chronic prednisone with AVN hip), HLD, HTN, DM who p/w worsening SOB over the past two days, denies CP/ palpitations, has home O2 setup (but doesn't use) but put it on after noticing SpO2 in the 80's, which improved SOB. Endorses worsening SWAIN, denies significant wheezing, cough, recent illness med changes, or changes in diet. Adherent with home meds.    Denies fever, chills, chest pain, palpitations, cough, abdominal pain, nausea, vomiting, diarrhea, constipation, urinary frequency, urgency, or dysuria, headaches, changes in vision, dizziness, numbness, tingling.    ED Course:   Vitals: BP: 130/77, HR: 77, Temp: 98, RR: 26, SpO2: 96% on 4L NC  Labs: BNP 2185, trop WNL, WBC: 3.08 (around baseline), Hb 8.9 () (around baseline), HCO3- 32,  RVP negative,     CXR: linear interstitial prominence in b/l bases, trace pleural fluid, potential cardiogenic pulmonary edema, medi-port tip at cavoatrial junction, as per personal read, official read pending   B/L LE Dopplers: No evidence of DVT  EKG: NSR PACs  Received in the ED: lasix 20mg IV, solumedrol 125mg IV, duonebs  PMH.      PAST HOSPITAL STAYS .  Home Medications:   * No Current Medications as of 05-May-2025 09:54 documented in Structured Notes  · folic acid 1 mg oral tablet: 1 tab(s) orally once a day (in the morning)  · midodrine 5 mg oral tablet: 1 tablet orally 3 times a day  · torsemide 20 mg oral tablet: 1 tab(s) orally once a day (in the morning)  · gabapentin 400 mg oral capsule: 1 cap(s) orally 2 times a day  · tiZANidine: 2 tab(s) orally once a day (at bedtime)  · Singulair 10 mg oral tablet: 1 tab(s) orally once a day (in the morning)  · Bentyl 10 mg oral capsule: 1 cap(s) orally 2 times a day, As Needed  · Leflunomide: once a day (after a meal)  · NexIUM 20 mg oral delayed release capsule: 1 cap(s) orally once a day (in the morning)  · simvastatin 10 mg oral tablet: 1 tab(s) orally once a day (at bedtime)  · Reclast Infusion , IV x 1 yearly:   · Vitamin D3 , 1 tablet by mouth 3x a week:   · IVIG monthly:   · amiodarone 100 mg oral tablet: 1 tab(s) orally once a day (in the morning)  · oxyCODONE 5 mg oral tablet: 1 tab(s) orally prn  · Eliquis 2.5 mg oral tablet: 1 tab(s) orally 2 times a day  · Procrit 10,000   ER MGMT .        BEST PRACTICE ISSUES.  . HOB ELEVATN.    .... Yes  . DIET  .   .... DASH 2L FR 5/26  . FREE WATER.   ....   .  IV FLUID.  .....   . PHARMAC DVT PPLX .    .... APIXABA 5/26 a 2.5 x 2 (nv af)   . NON PHARMAC DVT PPLX .      . STRESS ULCR PPLX .   .... PROTONIX 40 5/27/2025  . DATE/DM MGMT.   ..... See under Endocrine section   GENERAL DATA .   . COVID.         .... scv2 5/27/2025 (-)   . GOC.    ....    . ICU STAY.    .... no   . INFECTION PPLX .   ....   . ALLGY.   ....  nka  . WT.   .... 5/27/2025 64  . BMI.  .... 5/27/2025 25       [id number]  PROBLEM ASSESSMENT RECOMMENDATIONS.  RESP.   . GAS EXCHANGE .   .... target PO 90-95%   .... 5/28/2025 check abg to see if shes retaining co2     . HYPERCAPNIA   .... 5/30/2025 8a 3l 744/51/86   .... 5/30/2025 tco2 34   .... 5/30/2025 abg analysis suggests metabolic alkalosis plus resp acidosis   .... 5/30/2025 as pt co sob will try noct bpap 12/6/16/28     . SOB  .... DD COPD CHF     . RO VTE  .... venous duplx 5/26 (-)    . COPD  .... GIVEN PREDNISONE 50 5/27-5/29/2025 X 5D  .... SOLU   ........ 40.2 5/29/2025   ........ 40 5/31-6/2/2025   .... PRED 40 6/2/2025  .... NACL 3% bid 5/29   .... DUONEB.4 P 5/27/2025  .... ALBUTEROL.4 5/27  .... ADVAIR   ........ A 250 5/27   ........ A 500 6/3/2025   .... MONTELUKAST 10 5/30/2025   .... SPIRIVA 5/27    INFECTION.  . DATA  .... w 5/26-5/27-5/29-5/30 W 3 -  1.7 - 3.2- 2   .... pr 5/27/2025 pr .29   .... cxr 5/26 napd   .... ct  5/27/2025 cw 9/2/2023   ........ mild tracheobr secretns   ........ mosaic attenuation   ........ bl nodularity similar   ....... scattered linear and dependent atelectasis   ....... sm bl effsns   ....... mod indeterminate t8 veretebral body fr new   .... ct  5/31/2025   ......... incr small pl effs   ........ dilated pulm arteries suggestive of ph   .... rvp 5/26/2025 rvp (-)   .... no obvious active infectn      CARDIAC.  . ORTHOSTATIC HYPOTENSION  .... has v significant orthostatic hypotension   .... MIDODRINE   ........ m 10.2 5/26  ........ m 10.3 6/3/2025     . CAD    .... Trop 5/27/2025 tr 15       . CHF  .... 5/27/2025  1 + p edema  .... pbnp 5/2705/30/2025 pbnp 2185- 2330   .... tte 4/2025 mild ph  n vf   .... lasix   ........ 5/26 l 40.2   ....... 5/28/2025 l dced   .... torsemide 5/28/2025 t 20   .... check tte     . A fib   .... APIXABA 5/26 a 2.5 x 2   .... AMIODARONE 5/27/2025 5/26 A 100    HEMAT.  .... Plt 5/27/2025 plt 170   ....  monitor    . IMMUNOSUPPRESSIVE  .... LEFLUNAMIDE 10 5/27/2025    . ANEMIA  .... Hb 5/27-5/28-5/9-5/30-5/31-6/2/2025   .... HB 10- 8.9- 8.9  - 8.4 - 8.2 - 7.6   .... fa 5/27/2025 fa > 20   .... b12 5/27/2025 b12 1444   .... target hb 7 (+)    . TRANSFUSION  .... 6/2/2025 1 u prbc    . LEUKOPENIA   .... w 5/26-5/27-5/28 W 3 -  1.7 - 2.2    GI.   . DIET .   .... dash 2 liter fr 5/26    RENAL.  . DATA  .... Na 5/27/2025 Na 139   .... CO2 5/27/2025 co2 30   .... monitor    . HYPERKALEMIA   .... K 5/27-5/28/2025 K 5.7 - 4.6     . KIMBERLY ON CKD  .... Cr 5/27-5/28-5/29-5/30-5/31-6/1/2025   .... Cr 1.2 - 1.3 - 1.3 - 1.6 - 1.4 - 1.6    NEURO.  . PAIN  .... TIZANADINE 5/26 T 2 HS       [id number]   SUMMARY BASELINE .     . 85 y/o F with PMH AFon Eliquis, COPD (not on home o2), CKD, aplastic anemia, PMR (chronic prednisone with AVN hip), HLD, HTN, DM pt of Dr Gallegos not on home ox or home cpap used to use trelegy lives with spouse quit 40 y 1/2 ppd  CC.   . 5/26/2025 SHORTNESS OF BREATH   . 5/26/2026 HYPOXEMIA   . 5/26/2025 WHEEZE   MAIN ISSUES.  . HYPOXIA 5/26/2025  . RESP FAILURE   .... 5/30/2025 nict bpap prdrd   .... 6/1/2025 cannot tolerate bpap   . SOB  .... DD COPD CHF   .... venous duplx 5/26 (-)  . COPD ex   . A fib   .... APIXABA 5/26 a 2.5 x 2  . CHF  .... pbnp 5/27/2025 pbnp 2185  . ANEMIA  .... Hb 5/27/2025 HB 10  . TRANSFUSION  .... 6/2/2025 1 u prbc   . LEUKOPENIA   .... w 5/26-5/27 W 3 -  1.7   . KIMBERLY ON CKD  .... Cr 5/27-5/28-5/29-5/30-5/31-6/1/2025   .... Cr 1.2 - 1.3 - 1.3 - 1.6 - 1.4 - 1.6    PMH.   . AFon Eliquis,   . COPD (not on home o2),   . CKD,   . aplastic anemia,   . PMR (chronic prednisone with AVN hip),   . HLD,   . HTN,   . DM    PROCEDURES/DEVICES .      DISCUSSIONS.  .... Discussed with primary care and relevant consultants on an ongoing basis       TIME SPENT.  . Over 36 minutes aggregate care time spent on encounter; activities included   direct patient care, counseling and/or coordinating care reviewing notes, lab data/ imaging , discussion with multidisciplinary team/ patient  /family and explaining in detail risks, benefits, alternatives  of the recommendations     ROBERT HENRIQUEZ 84 5/27/2025 1942

## 2025-06-05 NOTE — PROGRESS NOTE ADULT - SUBJECTIVE AND OBJECTIVE BOX
CHIEF COMPLAINT/ REASON FOR VISIT  .. Patient was seen to address the  issue listed under PROBLEM LIST which is located toward bottom of this note     MARTINEZ PATEL 2EAS 201 D1    Allergies    No Known Allergies    Intolerances        PAST MEDICAL & SURGICAL HISTORY:  COPD (chronic obstructive pulmonary disease)      DM (diabetes mellitus)  diet-controlled      PAF (paroxysmal atrial fibrillation)  s/p ablation      Hiatal hernia      H/O aplastic anemia      History of IBS      H/O osteoporosis      HLD (hyperlipidemia)      PMR (polymyalgia rheumatica)      History of basal cell cancer      Chronic kidney disease (CKD)      Hypotension      Presence of IVC filter      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      History of immunodeficiency      Lumbar compression fracture      H/O hiatal hernia      H/O pulmonary fibrosis      H/O sinus bradycardia      History of fracture of right hip  "unoperable"      S/P hysterectomy      S/P foot surgery      S/P knee surgery      After cataract  bilateral eye cataract surgically removed      Closed right hip fracture  rigth hip ORIF july 19 2016      S/P IVC filter  nov 2016      S/P carpal tunnel release  Right 2008 & 6/27/17      H/O kyphoplasty      Port-A-Cath in place  right chest          FAMILY HISTORY:  Family history of bladder cancer (Mother)    Family history of myocardial infarction (Father)    FH: atrial fibrillation (Father)        Home Medications:  amiodarone 200 mg oral tablet: 0.5 tab(s) orally once a day (27 May 2025 13:21)  Bentyl 10 mg oral capsule: 1 cap(s) orally 2 times a day, As Needed (27 May 2025 13:22)  Eliquis 2.5 mg oral tablet: 1 tab(s) orally 2 times a day (27 May 2025 12:39)  esomeprazole 20 mg oral delayed release capsule: 1 cap(s) orally once a day (27 May 2025 13:23)  folic acid 1 mg oral tablet: 1 tab(s) orally once a day (in the morning) (27 May 2025 12:39)  gabapentin 400 mg oral capsule: 1 cap(s) orally 2 times a day (27 May 2025 12:39)  IVIG monthly:  (27 May 2025 12:39)  leflunomide 10 mg oral tablet: 1 tab(s) orally once a day (27 May 2025 13:23)  midodrine 5 mg oral tablet: 2 tab(s) orally in the morning and 1 tab orally in the evening (27 May 2025 13:23)  montelukast 10 mg oral tablet: 1 tab(s) orally once a day (27 May 2025 13:23)  predniSONE 5 mg oral tablet: 1 tab(s) orally once a day (27 May 2025 13:22)  Procrit 10,000 units/mL preservative-free injectable solution: injectable once a week WEDNESDAY (27 May 2025 12:39)  Reclast Infusion , IV x 1 yearly:  (27 May 2025 13:22)  simvastatin 10 mg oral tablet: 1 tab(s) orally once a day (at bedtime) (27 May 2025 12:39)  tiZANidine: 2 tab(s) orally once a day (at bedtime) as needed for  muscle spasm (27 May 2025 13:22)  torsemide 20 mg oral tablet: 1 tab(s) orally once a day (in the morning) (27 May 2025 12:39)      MEDICATIONS  (STANDING):  albuterol    0.083% 2.5 milliGRAM(s) Nebulizer every 6 hours  albuterol    90 MICROgram(s) HFA Inhaler 1 Puff(s) Inhalation every 4 hours  aMIOdarone    Tablet 100 milliGRAM(s) Oral daily  apixaban 2.5 milliGRAM(s) Oral every 12 hours  atorvastatin 10 milliGRAM(s) Oral at bedtime  chlorhexidine 2% Cloths 1 Application(s) Topical daily  fluticasone propionate/ salmeterol 500-50 MICROgram(s) Diskus 1 Dose(s) Inhalation two times a day  gabapentin 400 milliGRAM(s) Oral two times a day  leflunomide 10 milliGRAM(s) Oral daily  midodrine. 10 milliGRAM(s) Oral every 8 hours  montelukast 10 milliGRAM(s) Oral daily  pantoprazole    Tablet 40 milliGRAM(s) Oral before breakfast  predniSONE   Tablet 40 milliGRAM(s) Oral daily  sodium chloride 3%  Inhalation 4 milliLiter(s) Inhalation every 12 hours  tiotropium 2.5 MICROgram(s) Inhaler 2 Puff(s) Inhalation daily  tiZANidine 2 milliGRAM(s) Oral at bedtime  torsemide 20 milliGRAM(s) Oral daily    MEDICATIONS  (PRN):  albuterol/ipratropium for Nebulization 3 milliLiter(s) Nebulizer every 6 hours PRN Shortness of Breath and/or Wheezing  dicyclomine 10 milliGRAM(s) Oral two times a day before meals PRN bowel irritability  melatonin 3 milliGRAM(s) Oral at bedtime PRN Insomnia      Diet, Regular:   DASH/TLC Sodium & Cholesterol Restricted  2000mL Fluid Restriction ([id number]) (05-26-25 @ 23:27) [Active]          Vital Signs Last 24 Hrs  T(C): 37.1 (05 Jun 2025 08:45), Max: 37.1 (05 Jun 2025 08:45)  T(F): 98.7 (05 Jun 2025 08:45), Max: 98.7 (05 Jun 2025 08:45)  HR: 56 (05 Jun 2025 05:00) (56 - 64)  BP: 106/51 (05 Jun 2025 05:00) (106/51 - 111/57)  BP(mean): --  RR: 18 (05 Jun 2025 05:00) (18 - 20)  SpO2: 99% (05 Jun 2025 08:45) (98% - 100%)    Parameters below as of 05 Jun 2025 05:00  Patient On (Oxygen Delivery Method): nasal cannula  O2 Flow (L/min): 2        06-04-25 @ 07:01  -  06-05-25 @ 07:00  --------------------------------------------------------  IN: 0 mL / OUT: 2150 mL / NET: -2150 mL              LABS:                        8.8    5.23  )-----------( 186      ( 05 Jun 2025 08:00 )             27.5     06-05    139  |  101  |  66[H]  ----------------------------<  108[H]  5.0   |  33[H]  |  1.40[H]    Ca    9.8      05 Jun 2025 08:00  Mg     1.8     06-05        Urinalysis Basic - ( 05 Jun 2025 08:00 )    Color: x / Appearance: x / SG: x / pH: x  Gluc: 108 mg/dL / Ketone: x  / Bili: x / Urobili: x   Blood: x / Protein: x / Nitrite: x   Leuk Esterase: x / RBC: x / WBC x   Sq Epi: x / Non Sq Epi: x / Bacteria: x            WBC:  WBC Count: 5.23 K/uL (06-05 @ 08:00)  WBC Count: 5.15 K/uL (06-04 @ 07:02)  WBC Count: 4.81 K/uL (06-03 @ 06:10)  WBC Count: 6.01 K/uL (06-02 @ 06:05)      MICROBIOLOGY:  RECENT CULTURES:                  Sodium:  Sodium: 139 mmol/L (06-05 @ 08:00)  Sodium: 140 mmol/L (06-04 @ 07:02)  Sodium: 139 mmol/L (06-03 @ 06:10)  Sodium: 137 mmol/L (06-02 @ 06:05)      1.40 mg/dL 06-05 @ 08:00  1.60 mg/dL 06-04 @ 07:02  1.30 mg/dL 06-03 @ 06:10  1.50 mg/dL 06-02 @ 06:05      Hemoglobin:  Hemoglobin: 8.8 g/dL (06-05 @ 08:00)  Hemoglobin: 8.5 g/dL (06-04 @ 07:02)  Hemoglobin: 8.5 g/dL (06-03 @ 06:10)  Hemoglobin: 7.7 g/dL (06-02 @ 09:55)  Hemoglobin: 7.6 g/dL (06-02 @ 06:05)      Platelets: Platelet Count - Automated: 186 K/uL (06-05 @ 08:00)  Platelet Count - Automated: 142 K/uL (06-04 @ 07:02)  Platelet Count - Automated: 116 K/uL (06-03 @ 06:10)  Platelet Count - Automated: 136 K/uL (06-02 @ 06:05)          Urinalysis Basic - ( 05 Jun 2025 08:00 )    Color: x / Appearance: x / SG: x / pH: x  Gluc: 108 mg/dL / Ketone: x  / Bili: x / Urobili: x   Blood: x / Protein: x / Nitrite: x   Leuk Esterase: x / RBC: x / WBC x   Sq Epi: x / Non Sq Epi: x / Bacteria: x        RADIOLOGY & ADDITIONAL STUDIES:      MICROBIOLOGY:  RECENT CULTURES:

## 2025-06-05 NOTE — PROGRESS NOTE ADULT - ASSESSMENT
Prerenal azotemia, CKD 3a  Dyspnea: COPD/CHF  Diabetes  Hypertension  Aplastic anemia, requiring frequent blood transfusions    Stable renal indices. On torsemide. Renal sonogram results noted. Monitor blood sugar levels. Insulin coverage as needed.   Trend BP and titrate BP meds as needed. Monitor h/h trend. Transfuse PRBC's PRN. To continue current meds.   Avoid nephrotoxic meds as possible. Will follow electrolytes and renal function trend. D/w patient regarding need for out patient nephrology follow up.

## 2025-06-05 NOTE — CONSULT NOTE ADULT - CONSULT REASON
SOB
KIMBERLY
rectal bleed
sob.
KIMBERLY/CKD (follows w/Dr Chisholm as op)
KIMBERLY
evaluation of myelodysplasia D46.Z

## 2025-06-05 NOTE — SOCIAL WORK PROGRESS NOTE - NSSWPROGRESSNOTE_GEN_ALL_CORE
Patient to be discharged to [facility] re[facility] at [facility] at 4:30 . All in agreement .Copy of chart to follow. Provided IM letter and education

## 2025-06-05 NOTE — CONSULT NOTE ADULT - CONSULT REQUESTED DATE/TIME
30-May-2025 14:06
26-May-2025
31-May-2025 10:56
01-Jun-2025 15:03
27-May-2025 09:10
05-Jun-2025 14:35
29-May-2025 14:02

## 2025-06-05 NOTE — PROGRESS NOTE ADULT - SUBJECTIVE AND OBJECTIVE BOX
[facility]                                                       Dr. Chisholm, Dr. Franklin, Dr. Baron, Dr. Clark, Dr. Stock                                      [facility], [facility], [address]                                                 [address] [place] [address]                                      Ph: [phone]  Fax: [phone]                                         Ph: [phone]  Fax: [phone]      Patient is a 84y old  Female who presents with a chief complaint of Shortness of breath (02 Jun 2025 14:05)  Patient seen in follow up for CKD 3.        PAST MEDICAL HISTORY:  COPD (chronic obstructive pulmonary disease)    DM (diabetes mellitus)    Pulmonary fibrosis    PAF (paroxysmal atrial fibrillation)    Hiatal hernia    GIB (gastrointestinal bleeding)    Pericarditis    Shoulder fracture, right    Hematoma    H/O aplastic anemia    History of IBS    H/O osteoporosis    HLD (hyperlipidemia)    PMR (polymyalgia rheumatica)    History of basal cell cancer    Chronic kidney disease (CKD)    Hypotension    Presence of IVC filter    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    History of immunodeficiency    Lumbar compression fracture    H/O hiatal hernia    H/O pulmonary fibrosis    H/O sinus bradycardia    History of fracture of right hip      MEDICATIONS  (STANDING):  albuterol    0.083% 2.5 milliGRAM(s) Nebulizer every 6 hours  albuterol    90 MICROgram(s) HFA Inhaler 1 Puff(s) Inhalation every 4 hours  aMIOdarone    Tablet 100 milliGRAM(s) Oral daily  apixaban 2.5 milliGRAM(s) Oral every 12 hours  atorvastatin 10 milliGRAM(s) Oral at bedtime  chlorhexidine 2% Cloths 1 Application(s) Topical daily  fluticasone propionate/ salmeterol 500-50 MICROgram(s) Diskus 1 Dose(s) Inhalation two times a day  gabapentin 400 milliGRAM(s) Oral two times a day  leflunomide 10 milliGRAM(s) Oral daily  midodrine. 10 milliGRAM(s) Oral every 8 hours  montelukast 10 milliGRAM(s) Oral daily  pantoprazole    Tablet 40 milliGRAM(s) Oral before breakfast  predniSONE   Tablet 40 milliGRAM(s) Oral daily  sodium chloride 3%  Inhalation 4 milliLiter(s) Inhalation every 12 hours  tiotropium 2.5 MICROgram(s) Inhaler 2 Puff(s) Inhalation daily  tiZANidine 2 milliGRAM(s) Oral at bedtime  torsemide 20 milliGRAM(s) Oral daily    MEDICATIONS  (PRN):  albuterol/ipratropium for Nebulization 3 milliLiter(s) Nebulizer every 6 hours PRN Shortness of Breath and/or Wheezing  dicyclomine 10 milliGRAM(s) Oral two times a day before meals PRN bowel irritability  melatonin 3 milliGRAM(s) Oral at bedtime PRN Insomnia    T(C): 37.1 (06-05-25 @ 10:53), Max: 37.1 (06-05-25 @ 08:45)  HR: 58 (06-05-25 @ 10:53) (54 - 72)  BP: 103/65 (06-05-25 @ 10:53) (103/65 - 168/65)  RR: 18 (06-05-25 @ 10:53)  SpO2: 100% (06-05-25 @ 10:53)  Wt(kg): --  I&O's Detail    04 Jun 2025 07:01  -  05 Jun 2025 07:00  --------------------------------------------------------  IN:  Total IN: 0 mL    OUT:    Voided (mL): 2150 mL  Total OUT: 2150 mL    Total NET: -2150 mL      05 Jun 2025 07:01  -  05 Jun 2025 11:32  --------------------------------------------------------  IN:  Total IN: 0 mL    OUT:    Voided (mL): 500 mL  Total OUT: 500 mL    Total NET: -500 mL                    PHYSICAL EXAM:  General: No distress  Respiratory: b/l air entry  Cardiovascular: S1 S2  Gastrointestinal: soft  Extremities:  edema          LABORATORY:                        8.8    5.23  )-----------( 186      ( 05 Jun 2025 08:00 )             27.5     06-05    139  |  101  |  66[H]  ----------------------------<  108[H]  5.0   |  33[H]  |  1.40[H]    Ca    9.8      05 Jun 2025 08:00  Mg     1.8     06-05      Sodium: 139 mmol/L (06-05 @ 08:00)  Sodium: 140 mmol/L (06-04 @ 07:02)    Potassium: 5.0 mmol/L (06-05 @ 08:00)  Potassium: 5.2 mmol/L (06-04 @ 07:02)    Hemoglobin: 8.8 g/dL (06-05 @ 08:00)  Hemoglobin: 8.5 g/dL (06-04 @ 07:02)  Hemoglobin: 8.5 g/dL (06-03 @ 06:10)    Creatinine, Serum 1.40 (06-05 @ 08:00)  Creatinine, Serum 1.60 (06-04 @ 07:02)  Creatinine, Serum 1.30 (06-03 @ 06:10)          Urinalysis Basic - ( 05 Jun 2025 08:00 )    Color: x / Appearance: x / SG: x / pH: x  Gluc: 108 mg/dL / Ketone: x  / Bili: x / Urobili: x   Blood: x / Protein: x / Nitrite: x   Leuk Esterase: x / RBC: x / WBC x   Sq Epi: x / Non Sq Epi: x / Bacteria: x        < from: US Kidney and Bladder (06.04.25 @ 14:37) >    ACC: [id number] EXAM:  US KIDNEYS AND BLADDER   ORDERED BY: CAROL CHISHOLM     PROCEDURE DATE:  06/04/2025          INTERPRETATION:  CLINICAL INFORMATION: Chronic kidney disease. Urinary   retention    COMPARISON: CT 9/14/2023, U/S  9/15/2023    TECHNIQUE: Sonography of the kidneys and bladder.    FINDINGS:  Right kidney: 9.2 cm. No renal mass, hydronephrosis or calculi. Scattered   centimeter sized cysts and millimeter size cortical hyperechogenicities   or calcifications, measuring up to approx 14mm in upper pole, not   significantly changed  Left kidney: 9.5 cm. No renal mass, hydronephrosis or calculi.Scattered   cysts, measuring up to 1.9 cm. 7 mm upper pole and 9 mm mid cortical   hyperechogenicities  or calcifications, more prominent/enlarged from   9/15/2023    Urinary bladder: Within normal limits. Bladder base calcifications,   measure up to 14 mm, corresponding to and possibly enlarged from CT   9/14/2023. 17 cc post void residual volume (PVR).    IMPRESSION:  1.  No hydronephrosis..  2.  Bilateral renal cortical hyperechogenicities or calcifications,   increased/enlarged on LEFT and nonspecific  3.  Bladder base calcifications, possible enlarged from CT 9/14/2023.   Clinical correlation recommended  4.  17 cc PVR      --- End of Report ---            ANN MCGOVERN MD; Attending Radiologist  This document has been electronically signed. Jun 4 2025 10:58PM    < end of copied text >

## 2025-06-05 NOTE — PROGRESS NOTE ADULT - SUBJECTIVE AND OBJECTIVE BOX
Interval History:  going to rehab  Chart reviewed and events noted;   Overnight events:    MEDICATIONS  (STANDING):  albuterol    0.083% 2.5 milliGRAM(s) Nebulizer every 6 hours  albuterol    90 MICROgram(s) HFA Inhaler 1 Puff(s) Inhalation every 4 hours  aMIOdarone    Tablet 100 milliGRAM(s) Oral daily  apixaban 2.5 milliGRAM(s) Oral every 12 hours  atorvastatin 10 milliGRAM(s) Oral at bedtime  chlorhexidine 2% Cloths 1 Application(s) Topical daily  fluticasone propionate/ salmeterol 500-50 MICROgram(s) Diskus 1 Dose(s) Inhalation two times a day  gabapentin 400 milliGRAM(s) Oral two times a day  leflunomide 10 milliGRAM(s) Oral daily  midodrine. 10 milliGRAM(s) Oral every 8 hours  montelukast 10 milliGRAM(s) Oral daily  pantoprazole    Tablet 40 milliGRAM(s) Oral before breakfast  predniSONE   Tablet 40 milliGRAM(s) Oral daily  sodium chloride 3%  Inhalation 4 milliLiter(s) Inhalation every 12 hours  tiotropium 2.5 MICROgram(s) Inhaler 2 Puff(s) Inhalation daily  tiZANidine 2 milliGRAM(s) Oral at bedtime  torsemide 20 milliGRAM(s) Oral daily    MEDICATIONS  (PRN):  albuterol/ipratropium for Nebulization 3 milliLiter(s) Nebulizer every 6 hours PRN Shortness of Breath and/or Wheezing  dicyclomine 10 milliGRAM(s) Oral two times a day before meals PRN bowel irritability  melatonin 3 milliGRAM(s) Oral at bedtime PRN Insomnia      Vital Signs Last 24 Hrs  T(C): 36.8 (05 Jun 2025 15:07), Max: 37.1 (05 Jun 2025 08:45)  T(F): 98.2 (05 Jun 2025 15:07), Max: 98.7 (05 Jun 2025 08:45)  HR: 51 (05 Jun 2025 15:07) (51 - 72)  BP: 114/666 (05 Jun 2025 15:07) (103/65 - 114/666)  BP(mean): --  RR: 18 (05 Jun 2025 15:07) (18 - 18)  SpO2: 100% (05 Jun 2025 15:07) (97% - 100%)    Parameters below as of 05 Jun 2025 15:07  Patient On (Oxygen Delivery Method): nasal cannula  O2 Flow (L/min): 2      PHYSICAL EXAM  General: adult in NAD  HEENT: clear oropharynx, anicteric sclera, pink conjunctivae  Neck: supple  CV: normal S1S2 with no murmur rubs or gallops  Lungs: clear to auscultation, no wheezes, no rhales  Abdomen: soft non-tender non-distended, no hepato/splenomegaly  Ext: no clubbing cyanosis or edema  Skin: no rashes and no petichiae  Neuro: alert and oriented X3 no focal deficits      LABS:  CBC Full  -  ( 05 Jun 2025 08:00 )  WBC Count : 5.23 K/uL  RBC Count : 2.81 M/uL  Hemoglobin : 8.8 g/dL  Hematocrit : 27.5 %  Platelet Count - Automated : 186 K/uL  Mean Cell Volume : 97.9 fl  Mean Cell Hemoglobin : 31.3 pg  Mean Cell Hemoglobin Concentration : 32.0 g/dL  Auto Neutrophil # : x  Auto Lymphocyte # : x  Auto Monocyte # : x  Auto Eosinophil # : x  Auto Basophil # : x  Auto Neutrophil % : x  Auto Lymphocyte % : x  Auto Monocyte % : x  Auto Eosinophil % : x  Auto Basophil % : x    06-05    139  |  101  |  66[H]  ----------------------------<  108[H]  5.0   |  33[H]  |  1.40[H]    Ca    9.8      05 Jun 2025 08:00  Mg     1.8     06-05          fe studies      WBC trend  5.23 K/uL (06-05-25 @ 08:00)  5.15 K/uL (06-04-25 @ 07:02)  4.81 K/uL (06-03-25 @ 06:10)      Hgb trend  8.8 g/dL (06-05-25 @ 08:00)  8.5 g/dL (06-04-25 @ 07:02)  8.5 g/dL (06-03-25 @ 06:10)      plt trend  186 K/uL (06-05-25 @ 08:00)  142 K/uL (06-04-25 @ 07:02)  116 K/uL (06-03-25 @ 06:10)        RADIOLOGY & ADDITIONAL STUDIES:

## 2025-06-05 NOTE — PROGRESS NOTE ADULT - NS ATTEND AMEND GEN_ALL_CORE FT
84F Hx HTN, HLD, DM, PAF, CKD, aplastic anemia presented w/ worsening SOB and b/l LE edema; admitted for likely HFpEF exacerbation     p/w shortness of breath, multifactorial in nature, seemed mostly likely secondary to a COPD  - chronic lower extremity edema resolved    - S/p Lasix 40mg IV BID, and remains on Torsemide 20 daily    - Repeat TTE normal LV and RV size and function, EF 71%, PASP 54, increased LV mass and hypertrophy   + Anemia,, transfusing per primary   - Continue statin    - Continue Eliquis, Amiodarone   - Continue Midodrine.
84F Hx HTN, HLD, DM, PAF, CKD, aplastic anemia presented w/ worsening SOB and b/l LE edema; admitted for likely HFpEF exacerbation    - Pt p/w shortness of breath, multifactorial in nature, likely this is secondary to a COPD  - S/p Lasix 40mg IV BID, now on Torsemide 20 daily   - She feels more short of breath today.  ? repeat cxr  - Follow-up with pulmonary for COPD management.  - Repeat TTE pending  - Continue Simvastatin  - Continue Eliquis, Amiodarone  - Has very significant orthostatic hypotension  - Would check orthostatics daily  - Continue Midodrine
84F Hx HTN, HLD, DM, PAF, CKD, aplastic anemia presented w/ worsening SOB and b/l LE edema; admitted for likely HFpEF exacerbation    - Pt p/w shortness of breath, multifactorial in nature, seemed mostly likely secondary to a COPD  - chronic lower extremity edema resolved    - S/p Lasix 40mg IV BID, and remains on Torsemide 20 daily    - Pt reports worsened SOB yesterday am, repeated ct chest, effusions appeared larger and so gave addl iv lasix with improvement in sxs  - creat again up to 1.6, will not give addl diuretics beyond po torsemide and with prbc transfusions  - Repeat TTE normal LV and RV size and function, EF 71%, PASP 54, increased LV mass and hypertrophy   - No evidence of any active ischemia    - Known Afib   - Continue Eliquis, Amiodarone   - Has very significant orthostatic hypotension. and with diuresis her sbp is now only ~100    - Continue Midodrine.
84F Hx HTN, HLD, DM, PAF, CKD, aplastic anemia presented w/ worsening SOB and b/l LE edema; admitted for likely HFpEF exacerbation     her shortness of breath is multifactorial in nature.  Likely this is secondary to a COPD exacerbation.  She has chronic lower extremity edema.  Can continue Lasix 40 mg IV every 12 for now.  Can check a repeat echo given change in clinical status  she had an echo 4/2025 normal LV function normal right ventricular function mild pulmonary hypertension  Monitor creatinine  Has very significant orthostatic hypotension.  Continue midodrine.  Would check orthostatics daily  Continue amiodarone and Eliquis.  Follow-up with pulmonary for COPD management
84F Hx HTN, HLD, DM, PAF, CKD, aplastic anemia presented w/ worsening SOB and b/l LE edema; admitted for likely HFpEF exacerbation     p/w shortness of breath, multifactorial in nature, seemed mostly likely secondary to a COPD  - chronic lower extremity edema resolved    - S/p Lasix 40mg IV BID, and remains on Torsemide 20 daily    - Repeat TTE normal LV and RV size and function, EF 71%, PASP 54 consistent with moderate pHTN, increased LV mass and hypertrophy   - Has known pHTN but seems to be mild in the past.   + Anemia, transfusing per primary   - Her SOB seems to be multifactorial. She has COPD with chronic retention (unable to tolerate CPAP or BIPAP), pHTN, likely some diastolic dysfunction and her anemia. Repeat stat CXR this AM overall unchanged from prior and she does not appear volume overloaded on exam. Discussed case with pulm and primary attending.   - Continue statin    - Continue Eliquis, Amiodarone   - Continue Midodrine for orthostatic hypotension.
84F Hx HTN, HLD, DM, PAF, CKD, aplastic anemia presented w/ worsening SOB and b/l LE edema; admitted for likely HFpEF exacerbation    her shortness of breath is multifactorial in nature.  Likely this is secondary to a COPD exacerbation.  She has chronic lower extremity edema.  s/p iv lasix bid, with plan to start po torsemide in am  she had an echo 4/2025 normal LV function normal right ventricular function mild pulmonary hypertension  Monitor creatinine  Has very significant orthostatic hypotension.  Continue midodrine.  Would check orthostatics daily  Continue amiodarone and Eliquis.  Follow-up with pulmonary for COPD management.  Monitor hb closely
84F Hx HTN, HLD, DM, PAF, CKD, aplastic anemia presented w/ worsening SOB and b/l LE edema; admitted for likely HFpEF exacerbation    - Pt p/w shortness of breath, multifactorial in nature, likely this is secondary to a COPD  - chronic lower extremity edema resolved    - S/p Lasix 40mg IV BID, now on Torsemide 20 daily   - Creatinine:  <--1.60,  <--1.30. Can hold Torsemide given KIMBERLY  - Follow-up with pulmonary for COPD management. On IV steroids   - Pt reports SOB this am, Would repeat CXR  - Repeat TTE pending   - No evidence of any active ischemia    - Known Afib   - Continue Eliquis, Amiodarone   - Has very significant orthostatic hypotension  - Would check orthostatics daily  - BP stable and controlled   - Continue Midodrine
84F htn chol paf ckd adm with vol ol    more or less sob day to day  cxr 6/4 with inc pl eff  more sob today  to give addl lasix iv today  no acute ischemia   bp soft cont mido  at risk of decomp

## 2025-06-05 NOTE — PROGRESS NOTE ADULT - ASSESSMENT
IMPRESISION    Myelodysplasia who is transfusion and procrit dependent now admitted with COPD exacerbation    Hgb declined  s/p 1UPC 06/02   Hgb 7.6 increased to 8.5    BL LE dec BS  dyspnea    RECOMMENDATIONS    CHF and COPD  mgmt per medicine, pulm and Cardiology  on prednisone 40  on nebs, montelukast    going to rehab  will check labs there and evaluate for procrit and need of transfusion support  will evaluate in office after discharge from rehab

## 2025-06-05 NOTE — PROGRESS NOTE ADULT - SUBJECTIVE AND OBJECTIVE BOX
[facility] -- Sai Valle Pannella, Patel, Savella Goodger, Cohen  Office # [phone]      Follow Up:  Sob anemia     Subjective/Observations:     No events overnight resting comfortably in bed.  No complaints of chest pain, dyspnea, or palpitations reported. No signs of orthopnea or PND.    REVIEW OF SYSTEMS: All other review of systems is negative unless indicated above    PAST MEDICAL & SURGICAL HISTORY:  COPD (chronic obstructive pulmonary disease)      DM (diabetes mellitus)  diet-controlled      PAF (paroxysmal atrial fibrillation)  s/p ablation      Hiatal hernia      H/O aplastic anemia      History of IBS      H/O osteoporosis      HLD (hyperlipidemia)      PMR (polymyalgia rheumatica)      History of basal cell cancer      Chronic kidney disease (CKD)      Hypotension      Presence of IVC filter      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      History of immunodeficiency      Lumbar compression fracture      H/O hiatal hernia      H/O pulmonary fibrosis      H/O sinus bradycardia      History of fracture of right hip  "unoperable"      S/P hysterectomy      S/P foot surgery      S/P knee surgery      After cataract  bilateral eye cataract surgically removed      Closed right hip fracture  rigth hip ORIF 2016      S/P IVC filter  2016      S/P carpal tunnel release  Right  & 17      H/O kyphoplasty      Port-A-Cath in place  right chest          MEDICATIONS  (STANDING):  albuterol    0.083% 2.5 milliGRAM(s) Nebulizer every 6 hours  albuterol    90 MICROgram(s) HFA Inhaler 1 Puff(s) Inhalation every 4 hours  aMIOdarone    Tablet 100 milliGRAM(s) Oral daily  apixaban 2.5 milliGRAM(s) Oral every 12 hours  atorvastatin 10 milliGRAM(s) Oral at bedtime  chlorhexidine 2% Cloths 1 Application(s) Topical daily  fluticasone propionate/ salmeterol 500-50 MICROgram(s) Diskus 1 Dose(s) Inhalation two times a day  gabapentin 400 milliGRAM(s) Oral two times a day  leflunomide 10 milliGRAM(s) Oral daily  midodrine. 10 milliGRAM(s) Oral every 8 hours  montelukast 10 milliGRAM(s) Oral daily  pantoprazole    Tablet 40 milliGRAM(s) Oral before breakfast  predniSONE   Tablet 40 milliGRAM(s) Oral daily  sodium chloride 3%  Inhalation 4 milliLiter(s) Inhalation every 12 hours  tiotropium 2.5 MICROgram(s) Inhaler 2 Puff(s) Inhalation daily  tiZANidine 2 milliGRAM(s) Oral at bedtime  torsemide 20 milliGRAM(s) Oral daily    MEDICATIONS  (PRN):  albuterol/ipratropium for Nebulization 3 milliLiter(s) Nebulizer every 6 hours PRN Shortness of Breath and/or Wheezing  dicyclomine 10 milliGRAM(s) Oral two times a day before meals PRN bowel irritability  melatonin 3 milliGRAM(s) Oral at bedtime PRN Insomnia      Allergies    No Known Allergies    Intolerances        Vital Signs Last 24 Hrs  T(C): 36.8 (2025 05:00), Max: 37 (2025 11:07)  T(F): 98.2 (2025 05:00), Max: 98.6 (2025 11:07)  HR: 56 (2025 05:00) (56 - 64)  BP: 106/51 (2025 05:00) (106/51 - 111/57)  BP(mean): --  RR: 18 (2025 05:00) (18 - 20)  SpO2: 100% (2025 05:00) (98% - 100%)    Parameters below as of 2025 05:00  Patient On (Oxygen Delivery Method): nasal cannula  O2 Flow (L/min): 2      I&O's Summary    2025 07:01  -  2025 07:00  --------------------------------------------------------  IN: 0 mL / OUT: 2150 mL / NET: -2150 mL          PHYSICAL EXAM:  TELE:   Constitutional: NAD, awake and alert, well-developed  HEENT: Moist Mucous Membranes, Anicteric  Pulmonary: Non-labored, breath sounds are DIM   Cardiovascular: Regular, S1 and S2 nl, No murmurs, rubs, gallops or clicks  Gastrointestinal: Bowel Sounds present, soft, nontender.   Lymph: No lymphadenopathy. No peripheral edema.  Skin: No visible rashes or ulcers.  Psych:  Mood & affect appropriate    LABS: All Labs Reviewed:                        8.5    5.15  )-----------( 142      ( 2025 07:02 )             26.6                         8.5    4.81  )-----------( 116      ( 2025 06:10 )             25.9                         7.7    x     )-----------( x        ( 2025 09:55 )             24.2     2025 07:02    140    |  100    |  67     ----------------------------<  88     5.2     |  34     |  1.60   2025 06:10    139    |  100    |  68     ----------------------------<  90     5.1     |  33     |  1.30     Ca    9.3        2025 07:02  Ca    9.3        2025 06:10               EC Lead ECG:   Ventricular Rate 69 BPM    Atrial Rate 69 BPM    P-R Interval 134 ms    QRS Duration 78 ms    Q-T Interval 420 ms    QTC Calculation(Bazett) 450 ms    P Axis 86 degrees    R Axis -22 degrees    T Axis 58 degrees    Diagnosis Line Sinus rhythm with premature supraventricular complexes and with occasional premature ventricular complexes    Confirmed by Saima Hernandez ([id number]) on 2025 1:09:43 PM (25 @ 22:27)      TRANSTHORACIC ECHOCARDIOGRAM REPORT  ________________________________________________________________________________                                      _______       Pt. Name:       MARTINEZ JONES Study Date:    2025  MRN:            [id number]        YOB: 1940  Accession #:    [id number]       Age:           84 years  Account#:       [id number]      Gender:        F  Heart Rate:                     Height:        62.99 in (160.00 cm)  Rhythm:                         Weight:        154.32 lb (70.00 kg)  Blood Pressure: 91/55 mmHg      BSA/BMI:       1.73 m² / 27.34 kg/m²  ________________________________________________________________________________________  Referring Physician:    [id number] James Osei  Interpreting Physician: Daniel Jorge M.D.  Primary Sonographer:    Kelsey Monson RDCS    CPT:               ECHO TTE WO CON COMP W DOPP - 53358.m  Indication(s):     Cardiomyopathy, unspecified - I42.9  Procedure:         Transthoracic echocardiogram with 2-D, M-mode and complete                     spectral and color flow Doppler.  Ordering Location: [facility]  Admission Status:  Inpatient    _______________________________________________________________________________________     CONCLUSIONS:      1. Leftventricular systolic function is normal with an ejection fraction of 71 % by Arias's method of disks.   2. Normal right ventricular cavity size, with normal wall thickness, and normal right ventricular systolic function.   3. Left atrium is moderately dilated.   4. Mild mitral regurgitation.   5. Estimated pulmonary artery systolic pressure is 54 mmHg.   6. Mild pulmonic regurgitation.   7. Mild tricuspid regurgitation.   8. Small bilateral pleural effusion noted.   9. There is increased LV mass and concentric hypertrophy.    ________________________________________________________________________________________  FINDINGS:     Left Ventricle:  Left ventricular systolic function is normal with a calculated ejection fraction of 71 % by the Arias's biplane method of disks. Moderate left ventricular hypertrophy. There is increased LV mass and concentric hypertrophy.     Right Ventricle:  The right ventricular cavity is normal in size, with normal wall thickness and right ventricular systolic function is normal.     Left Atrium:  The left atrium is moderately dilated with an indexed volume of 44.98 ml/m².     Right Atrium:  The right atrium is normal in size with an indexed volume of 21.88 ml/m² and an indexed area of 8.72 cm²/m².     Aortic Valve:  The aortic valve is tricuspid with normal leaflet excursion. There is mild thickening of the aortic valve leaflets.     Mitral Valve:  Structurally normal mitral valve with normal leaflet excursion. There is calcification of the mitralvalve annulus. There is mild leaflet calcification. There is mild mitral regurgitation.     Tricuspid Valve:  The tricuspid valve is structurally normal with normal leaflet excursion. There is mild tricuspid regurgitation. Estimated pulmonary artery systolic pressure is 54 mmHg.     Pulmonic Valve:  Structurally normal pulmonic valve with normal leaflet excursion. There is mild pulmonic regurgitation.     Aorta:  The aortic root at the sinuses of Valsalva is normal in size, measuring 3.00 cm (indexed 1.73 cm/m²). The aortic arch diameter is normal in size, measuring 2.4 cm (indexed 1.39 cm/m²).     Pericardium:  There is a trace pericardial effusion.     Pleura:  Small bilateral pleural effusion noted.     Systemic Veins:  The inferior vena cava is normal in size measuring 1.77 cm in diameter, (normal <2.1cm) with abnormal inspiratory collapse (abnormal <50%) consistent with mildly elevated right atrial pressure (~8, range 5-10mmHg).  ____________________________________________________________________  QUANTITATIVE DATA:  Left Ventricle Measurements: (Indexed to BSA)     IVSd (2D):   1.3 cm  LVPWd (2D):  1.2 cm  LVIDd (2D):  4.5 cm  LVIDs (2D):  2.7 cm  LV Mass:     199 g  114.9 g/m²  LV Vol d, MOD A2C: 66.0 ml 38.11 ml/m²  LV Vol d,MOD A4C: 61.7 ml 35.63 ml/m²  LV Vol d, MOD BP:  63.9 ml 36.89 ml/m²  LV Vol s, MOD A2C: 19.4 ml 11.20 ml/m²  LV Vol s, MOD A4C: 16.4 ml 9.47 ml/m²  LV Vol s, MOD BP:  18.2 ml 10.52 ml/m²  LVOT SV MOD BP:    45.7 ml  LV EF% MOD BP:     71 %     MV E Vmax:    1.23 m/s  MV A Vmax:    0.68 m/s  MV E/A:       1.82  e' lateral:   7.51 cm/s  e' medial:    3.05 cm/s  E/e' lateral: 16.38  E/e' medial:  40.33  E/e' Average: 23.30  MV DT:        207 msec    Aorta Measurements: (Normal range) (Indexed to BSA)     Ao Root d 3.00 cm (2.7 - 3.3 cm) 1.73 cm/m²  Ao Arch:  2.4 cm            Left Atrium Measurements: (Indexed to BSA)  LA Diam 2D: 4.10 cm         Right Atrial Measurements:     RA Vol s, MOD A4C         37.9 ml  RA Vol s, MOD A4C i BSA   21.88 ml/m²  RA Area s, MOD A4C        15.1 cm²  RA Area s, MOD A4C, i BSA 8.72 cm²/m²    Mitral Valve Measurements:     MV E Vmax: 1.2 m/s         MR Vmax:          4.19 m/s  MV A Vmax: 0.7 m/s         MR Peak Gradient: 70.2 mmHg  MV E/A:    1.8       Tricuspid Valve Measurements:     TR Vmax:          3.4 m/s  TR Peak Gradient: 45.7 mmHg  RA Pressure:      8 mmHg  PASP:             54 mmHg    ________________________________________________________________________________________  Electronically signedon 2025 at 1:09:26 PM by Daniel Jorge M.D.         *** Final ***      Radiology:         [facility] -- Sai Valle Pannella, Patel, Savella Goodger, Cohen  Office # [phone]      Follow Up:  Sob anemia     Subjective/Observations:     Seen sitting in chair + shortness of breath , remains on nC   no chest pain dizziness or palpitations      REVIEW OF SYSTEMS: All other review of systems is negative unless indicated above    PAST MEDICAL & SURGICAL HISTORY:  COPD (chronic obstructive pulmonary disease)      DM (diabetes mellitus)  diet-controlled      PAF (paroxysmal atrial fibrillation)  s/p ablation      Hiatal hernia      H/O aplastic anemia      History of IBS      H/O osteoporosis      HLD (hyperlipidemia)      PMR (polymyalgia rheumatica)      History of basal cell cancer      Chronic kidney disease (CKD)      Hypotension      Presence of IVC filter      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      History of immunodeficiency      Lumbar compression fracture      H/O hiatal hernia      H/O pulmonary fibrosis      H/O sinus bradycardia      History of fracture of right hip  "unoperable"      S/P hysterectomy      S/P foot surgery      S/P knee surgery      After cataract  bilateral eye cataract surgically removed      Closed right hip fracture  rigth hip ORIF 2016      S/P IVC filter  2016      S/P carpal tunnel release  Right  & 17      H/O kyphoplasty      Port-A-Cath in place  right chest          MEDICATIONS  (STANDING):  albuterol    0.083% 2.5 milliGRAM(s) Nebulizer every 6 hours  albuterol    90 MICROgram(s) HFA Inhaler 1 Puff(s) Inhalation every 4 hours  aMIOdarone    Tablet 100 milliGRAM(s) Oral daily  apixaban 2.5 milliGRAM(s) Oral every 12 hours  atorvastatin 10 milliGRAM(s) Oral at bedtime  chlorhexidine 2% Cloths 1 Application(s) Topical daily  fluticasone propionate/ salmeterol 500-50 MICROgram(s) Diskus 1 Dose(s) Inhalation two times a day  gabapentin 400 milliGRAM(s) Oral two times a day  leflunomide 10 milliGRAM(s) Oral daily  midodrine. 10 milliGRAM(s) Oral every 8 hours  montelukast 10 milliGRAM(s) Oral daily  pantoprazole    Tablet 40 milliGRAM(s) Oral before breakfast  predniSONE   Tablet 40 milliGRAM(s) Oral daily  sodium chloride 3%  Inhalation 4 milliLiter(s) Inhalation every 12 hours  tiotropium 2.5 MICROgram(s) Inhaler 2 Puff(s) Inhalation daily  tiZANidine 2 milliGRAM(s) Oral at bedtime  torsemide 20 milliGRAM(s) Oral daily    MEDICATIONS  (PRN):  albuterol/ipratropium for Nebulization 3 milliLiter(s) Nebulizer every 6 hours PRN Shortness of Breath and/or Wheezing  dicyclomine 10 milliGRAM(s) Oral two times a day before meals PRN bowel irritability  melatonin 3 milliGRAM(s) Oral at bedtime PRN Insomnia      Allergies    No Known Allergies    Intolerances        Vital Signs Last 24 Hrs  T(C): 36.8 (2025 05:00), Max: 37 (2025 11:07)  T(F): 98.2 (2025 05:00), Max: 98.6 (2025 11:07)  HR: 56 (2025 05:00) (56 - 64)  BP: 106/51 (2025 05:00) (106/51 - 111/57)  BP(mean): --  RR: 18 (2025 05:00) (18 - 20)  SpO2: 100% (2025 05:00) (98% - 100%)    Parameters below as of 2025 05:00  Patient On (Oxygen Delivery Method): nasal cannula  O2 Flow (L/min): 2      I&O's Summary    2025 07:01  -  2025 07:00  --------------------------------------------------------  IN: 0 mL / OUT: 2150 mL / NET: -2150 mL          PHYSICAL EXAM:    Constitutional:awake and alert, well-developed  HEENT: Moist Mucous Membranes, Anicteric  Pulmonary: mild tachypnea breath sounds are DIM   Cardiovascular: Regular, S1 and S2 nl, No murmurs, rubs, gallops or clicks  Gastrointestinal: Bowel Sounds present, soft, nontender.   Lymph: No lymphadenopathy. No peripheral edema.  Skin: No visible rashes or ulcers.  Psych:  Mood & affect appropriate    LABS: All Labs Reviewed:                        8.5    515  )-----------( 142      ( 2025 07:02 )             26.6                         8.5    4.81  )-----------( 116      ( 2025 06:10 )             25.9                         7.7    x     )-----------( x        ( 2025 09:55 )             24.2     2025 07:02    140    |  100    |  67     ----------------------------<  88     5.2     |  34     |  1.60   2025 06:10    139    |  100    |  68     ----------------------------<  90     5.1     |  33     |  1.30     Ca    9.3        2025 07:02  Ca    9.3        2025 06:10               EC Lead ECG:   Ventricular Rate 69 BPM    Atrial Rate 69 BPM    P-R Interval 134 ms    QRS Duration 78 ms    Q-T Interval 420 ms    QTC Calculation(Bazett) 450 ms    P Axis 86 degrees    R Axis -22 degrees    T Axis 58 degrees    Diagnosis Line Sinus rhythm with premature supraventricular complexes and with occasional premature ventricular complexes    Confirmed by Saima Hernandez ([id number]) on 2025 1:09:43 PM (25 @ 22:27)      TRANSTHORACIC ECHOCARDIOGRAM REPORT  ________________________________________________________________________________                                      _______       Pt. Name:       MARTINEZ JONES Study Date:    2025  MRN:            [id number]        YOB: 1940  Accession #:    [id number]       Age:           84 years  Account#:       [id number]      Gender:        F  Heart Rate:                     Height:        62.99 in (160.00 cm)  Rhythm:                         Weight:        154.32 lb (70.00 kg)  Blood Pressure: 91/55 mmHg      BSA/BMI:       1.73 m² / 27.34 kg/m²  ________________________________________________________________________________________  Referring Physician:    [id number] James Osei  Interpreting Physician: Daniel Jorge M.D.  Primary Sonographer:    Kelsey Monson STAR    CPT:               ECHO TTE WO CON COMP W DOPP - 82287.m  Indication(s):     Cardiomyopathy, unspecified - I42.9  Procedure:         Transthoracic echocardiogram with 2-D, M-mode and complete                     spectral and color flow Doppler.  Ordering Location: [facility]  Admission Status:  Inpatient    _______________________________________________________________________________________     CONCLUSIONS:      1. Leftventricular systolic function is normal with an ejection fraction of 71 % by Arias's method of disks.   2. Normal right ventricular cavity size, with normal wall thickness, and normal right ventricular systolic function.   3. Left atrium is moderately dilated.   4. Mild mitral regurgitation.   5. Estimated pulmonary artery systolic pressure is 54 mmHg.   6. Mild pulmonic regurgitation.   7. Mild tricuspid regurgitation.   8. Small bilateral pleural effusion noted.   9. There is increased LV mass and concentric hypertrophy.    ________________________________________________________________________________________  FINDINGS:     Left Ventricle:  Left ventricular systolic function is normal with a calculated ejection fraction of 71 % by the Arias's biplane method of disks. Moderate left ventricular hypertrophy. There is increased LV mass and concentric hypertrophy.     Right Ventricle:  The right ventricular cavity is normal in size, with normal wall thickness and right ventricular systolic function is normal.     Left Atrium:  The left atrium is moderately dilated with an indexed volume of 44.98 ml/m².     Right Atrium:  The right atrium is normal in size with an indexed volume of 21.88 ml/m² and an indexed area of 8.72 cm²/m².     Aortic Valve:  The aortic valve is tricuspid with normal leaflet excursion. There is mild thickening of the aortic valve leaflets.     Mitral Valve:  Structurally normal mitral valve with normal leaflet excursion. There is calcification of the mitralvalve annulus. There is mild leaflet calcification. There is mild mitral regurgitation.     Tricuspid Valve:  The tricuspid valve is structurally normal with normal leaflet excursion. There is mild tricuspid regurgitation. Estimated pulmonary artery systolic pressure is 54 mmHg.     Pulmonic Valve:  Structurally normal pulmonic valve with normal leaflet excursion. There is mild pulmonic regurgitation.     Aorta:  The aortic root at the sinuses of Valsalva is normal in size, measuring 3.00 cm (indexed 1.73 cm/m²). The aortic arch diameter is normal in size, measuring 2.4 cm (indexed 1.39 cm/m²).     Pericardium:  There is a trace pericardial effusion.     Pleura:  Small bilateral pleural effusion noted.     Systemic Veins:  The inferior vena cava is normal in size measuring 1.77 cm in diameter, (normal <2.1cm) with abnormal inspiratory collapse (abnormal <50%) consistent with mildly elevated right atrial pressure (~8, range 5-10mmHg).  ____________________________________________________________________  QUANTITATIVE DATA:  Left Ventricle Measurements: (Indexed to BSA)     IVSd (2D):   1.3 cm  LVPWd (2D):  1.2 cm  LVIDd (2D):  4.5 cm  LVIDs (2D):  2.7 cm  LV Mass:     199 g  114.9 g/m²  LV Vol d, MOD A2C: 66.0 ml 38.11 ml/m²  LV Vol d,MOD A4C: 61.7 ml 35.63 ml/m²  LV Vol d, MOD BP:  63.9 ml 36.89 ml/m²  LV Vol s, MOD A2C: 19.4 ml 11.20 ml/m²  LV Vol s, MOD A4C: 16.4 ml 9.47 ml/m²  LV Vol s, MOD BP:  18.2 ml 10.52 ml/m²  LVOT SV MOD BP:    45.7 ml  LV EF% MOD BP:     71 %     MV E Vmax:    1.23 m/s  MV A Vmax:    0.68 m/s  MV E/A:       1.82  e' lateral:   7.51 cm/s  e' medial:    3.05 cm/s  E/e' lateral: 16.38  E/e' medial:  40.33  E/e' Average: 23.30  MV DT:        207 msec    Aorta Measurements: (Normal range) (Indexed to BSA)     Ao Root d 3.00 cm (2.7 - 3.3 cm) 1.73 cm/m²  Ao Arch:  2.4 cm            Left Atrium Measurements: (Indexed to BSA)  LA Diam 2D: 4.10 cm         Right Atrial Measurements:     RA Vol s, MOD A4C         37.9 ml  RA Vol s, MOD A4C i BSA   21.88 ml/m²  RA Area s, MOD A4C        15.1 cm²  RA Area s, MOD A4C, i BSA 8.72 cm²/m²    Mitral Valve Measurements:     MV E Vmax: 1.2 m/s         MR Vmax:          4.19 m/s  MV A Vmax: 0.7 m/s         MR Peak Gradient: 70.2 mmHg  MV E/A:    1.8       Tricuspid Valve Measurements:     TR Vmax:          3.4 m/s  TR Peak Gradient: 45.7 mmHg  RA Pressure:      8 mmHg  PASP:             54 mmHg    ________________________________________________________________________________________  Electronically signedon 2025 at 1:09:26 PM by Daniel Jorge M.D.         *** Final ***      Radiology:

## 2025-06-06 ENCOUNTER — NON-APPOINTMENT (OUTPATIENT)
Age: 85
End: 2025-06-06

## 2025-06-08 ENCOUNTER — INPATIENT (INPATIENT)
Facility: HOSPITAL | Age: 85
LOS: 25 days | Discharge: OTHER INSTITUTION | DRG: 195 | End: 2025-07-04
Attending: FAMILY MEDICINE | Admitting: INTERNAL MEDICINE
Payer: MEDICARE

## 2025-06-08 VITALS
HEART RATE: 71 BPM | DIASTOLIC BLOOD PRESSURE: 48 MMHG | TEMPERATURE: 100 F | RESPIRATION RATE: 16 BRPM | SYSTOLIC BLOOD PRESSURE: 82 MMHG | WEIGHT: 147.05 LBS | HEIGHT: 63 IN | OXYGEN SATURATION: 99 %

## 2025-06-08 DIAGNOSIS — Z90.710 ACQUIRED ABSENCE OF BOTH CERVIX AND UTERUS: Chronic | ICD-10-CM

## 2025-06-08 DIAGNOSIS — H26.40 UNSPECIFIED SECONDARY CATARACT: Chronic | ICD-10-CM

## 2025-06-08 DIAGNOSIS — A41.9 SEPSIS, UNSPECIFIED ORGANISM: ICD-10-CM

## 2025-06-08 DIAGNOSIS — E78.5 HYPERLIPIDEMIA, UNSPECIFIED: ICD-10-CM

## 2025-06-08 DIAGNOSIS — E11.9 TYPE 2 DIABETES MELLITUS WITHOUT COMPLICATIONS: ICD-10-CM

## 2025-06-08 DIAGNOSIS — J44.9 CHRONIC OBSTRUCTIVE PULMONARY DISEASE, UNSPECIFIED: ICD-10-CM

## 2025-06-08 DIAGNOSIS — M35.3 POLYMYALGIA RHEUMATICA: ICD-10-CM

## 2025-06-08 DIAGNOSIS — Z98.890 OTHER SPECIFIED POSTPROCEDURAL STATES: Chronic | ICD-10-CM

## 2025-06-08 DIAGNOSIS — N17.9 ACUTE KIDNEY FAILURE, UNSPECIFIED: ICD-10-CM

## 2025-06-08 DIAGNOSIS — Z95.828 PRESENCE OF OTHER VASCULAR IMPLANTS AND GRAFTS: Chronic | ICD-10-CM

## 2025-06-08 DIAGNOSIS — Z98.89 OTHER SPECIFIED POSTPROCEDURAL STATES: Chronic | ICD-10-CM

## 2025-06-08 DIAGNOSIS — S72.001A FRACTURE OF UNSPECIFIED PART OF NECK OF RIGHT FEMUR, INITIAL ENCOUNTER FOR CLOSED FRACTURE: Chronic | ICD-10-CM

## 2025-06-08 DIAGNOSIS — D61.9 APLASTIC ANEMIA, UNSPECIFIED: ICD-10-CM

## 2025-06-08 DIAGNOSIS — Z29.9 ENCOUNTER FOR PROPHYLACTIC MEASURES, UNSPECIFIED: ICD-10-CM

## 2025-06-08 DIAGNOSIS — R79.1 ABNORMAL COAGULATION PROFILE: ICD-10-CM

## 2025-06-08 DIAGNOSIS — I95.9 HYPOTENSION, UNSPECIFIED: ICD-10-CM

## 2025-06-08 DIAGNOSIS — I48.91 UNSPECIFIED ATRIAL FIBRILLATION: ICD-10-CM

## 2025-06-08 DIAGNOSIS — J18.9 PNEUMONIA, UNSPECIFIED ORGANISM: ICD-10-CM

## 2025-06-08 LAB
ALBUMIN SERPL ELPH-MCNC: 3.1 G/DL — LOW (ref 3.3–5)
ALBUMIN SERPL ELPH-MCNC: 3.4 G/DL — SIGNIFICANT CHANGE UP (ref 3.3–5)
ALP SERPL-CCNC: 39 U/L — LOW (ref 40–120)
ALP SERPL-CCNC: 46 U/L — SIGNIFICANT CHANGE UP (ref 40–120)
ALT FLD-CCNC: 35 U/L — SIGNIFICANT CHANGE UP (ref 12–78)
ALT FLD-CCNC: 39 U/L — SIGNIFICANT CHANGE UP (ref 12–78)
ANION GAP SERPL CALC-SCNC: 11 MMOL/L — SIGNIFICANT CHANGE UP (ref 5–17)
ANION GAP SERPL CALC-SCNC: 9 MMOL/L — SIGNIFICANT CHANGE UP (ref 5–17)
ANISOCYTOSIS BLD QL: SIGNIFICANT CHANGE UP
APPEARANCE UR: CLEAR — SIGNIFICANT CHANGE UP
APTT BLD: 34.5 SEC — SIGNIFICANT CHANGE UP (ref 26.1–36.8)
APTT BLD: 37.9 SEC — HIGH (ref 26.1–36.8)
AST SERPL-CCNC: 11 U/L — LOW (ref 15–37)
AST SERPL-CCNC: 18 U/L — SIGNIFICANT CHANGE UP (ref 15–37)
BACTERIA # UR AUTO: ABNORMAL /HPF
BASO STIPL BLD QL SMEAR: PRESENT — SIGNIFICANT CHANGE UP
BASOPHILS # BLD AUTO: 0 K/UL — SIGNIFICANT CHANGE UP (ref 0–0.2)
BASOPHILS # BLD AUTO: 0 K/UL — SIGNIFICANT CHANGE UP (ref 0–0.2)
BASOPHILS NFR BLD AUTO: 0 % — SIGNIFICANT CHANGE UP (ref 0–2)
BASOPHILS NFR BLD AUTO: 0 % — SIGNIFICANT CHANGE UP (ref 0–2)
BILIRUB SERPL-MCNC: 1.1 MG/DL — SIGNIFICANT CHANGE UP (ref 0.2–1.2)
BILIRUB SERPL-MCNC: 2 MG/DL — HIGH (ref 0.2–1.2)
BILIRUB UR-MCNC: NEGATIVE — SIGNIFICANT CHANGE UP
BLD GP AB SCN SERPL QL: SIGNIFICANT CHANGE UP
BUN SERPL-MCNC: 70 MG/DL — HIGH (ref 7–23)
BUN SERPL-MCNC: 72 MG/DL — HIGH (ref 7–23)
CALCIUM SERPL-MCNC: 8 MG/DL — LOW (ref 8.5–10.1)
CALCIUM SERPL-MCNC: 8.6 MG/DL — SIGNIFICANT CHANGE UP (ref 8.5–10.1)
CHLORIDE SERPL-SCNC: 96 MMOL/L — SIGNIFICANT CHANGE UP (ref 96–108)
CHLORIDE SERPL-SCNC: 99 MMOL/L — SIGNIFICANT CHANGE UP (ref 96–108)
CK MB BLD-MCNC: <9.1 % — HIGH (ref 0–3.5)
CK MB CFR SERPL CALC: <1 NG/ML — SIGNIFICANT CHANGE UP (ref 0–3.6)
CK MB CFR SERPL CALC: <1 NG/ML — SIGNIFICANT CHANGE UP (ref 0–3.6)
CK SERPL-CCNC: 11 U/L — LOW (ref 26–192)
CO2 SERPL-SCNC: 27 MMOL/L — SIGNIFICANT CHANGE UP (ref 22–31)
CO2 SERPL-SCNC: 29 MMOL/L — SIGNIFICANT CHANGE UP (ref 22–31)
COLOR SPEC: YELLOW — SIGNIFICANT CHANGE UP
CREAT SERPL-MCNC: 1.9 MG/DL — HIGH (ref 0.5–1.3)
CREAT SERPL-MCNC: 2 MG/DL — HIGH (ref 0.5–1.3)
CRP SERPL-MCNC: 76 MG/L — HIGH
DIFF PNL FLD: NEGATIVE — SIGNIFICANT CHANGE UP
EGFR: 24 ML/MIN/1.73M2 — LOW
EGFR: 24 ML/MIN/1.73M2 — LOW
EGFR: 26 ML/MIN/1.73M2 — LOW
EGFR: 26 ML/MIN/1.73M2 — LOW
EOSINOPHIL # BLD AUTO: 0 K/UL — SIGNIFICANT CHANGE UP (ref 0–0.5)
EOSINOPHIL # BLD AUTO: 0 K/UL — SIGNIFICANT CHANGE UP (ref 0–0.5)
EOSINOPHIL NFR BLD AUTO: 0 % — SIGNIFICANT CHANGE UP (ref 0–6)
EOSINOPHIL NFR BLD AUTO: 0 % — SIGNIFICANT CHANGE UP (ref 0–6)
EPI CELLS # UR: PRESENT
ERYTHROCYTE [SEDIMENTATION RATE] IN BLOOD: 17 MM/HR — SIGNIFICANT CHANGE UP (ref 0–20)
FLUAV AG NPH QL: SIGNIFICANT CHANGE UP
FLUBV AG NPH QL: SIGNIFICANT CHANGE UP
GLUCOSE SERPL-MCNC: 280 MG/DL — HIGH (ref 70–99)
GLUCOSE SERPL-MCNC: 97 MG/DL — SIGNIFICANT CHANGE UP (ref 70–99)
GLUCOSE UR QL: NEGATIVE MG/DL — SIGNIFICANT CHANGE UP
GRAM STN FLD: ABNORMAL
HCT VFR BLD CALC: 23.2 % — LOW (ref 34.5–45)
HCT VFR BLD CALC: 27.3 % — LOW (ref 34.5–45)
HGB BLD-MCNC: 7.5 G/DL — LOW (ref 11.5–15.5)
HGB BLD-MCNC: 8.7 G/DL — LOW (ref 11.5–15.5)
HYPOGRAN NEUTS BLD QL SMEAR: PRESENT — SIGNIFICANT CHANGE UP
INR BLD: 2.26 RATIO — HIGH (ref 0.85–1.16)
INR BLD: 2.27 RATIO — HIGH (ref 0.85–1.16)
KETONES UR QL: NEGATIVE MG/DL — SIGNIFICANT CHANGE UP
LACTATE SERPL-SCNC: 1.4 MMOL/L — SIGNIFICANT CHANGE UP (ref 0.7–2)
LEUKOCYTE ESTERASE UR-ACNC: ABNORMAL
LYMPHOCYTES # BLD AUTO: 0.97 K/UL — LOW (ref 1–3.3)
LYMPHOCYTES # BLD AUTO: 1.28 K/UL — SIGNIFICANT CHANGE UP (ref 1–3.3)
LYMPHOCYTES # BLD AUTO: 3 % — LOW (ref 13–44)
LYMPHOCYTES # BLD AUTO: 3 % — LOW (ref 13–44)
MACROCYTES BLD QL: SLIGHT — SIGNIFICANT CHANGE UP
MAGNESIUM SERPL-MCNC: 1.4 MG/DL — LOW (ref 1.6–2.6)
MAGNESIUM SERPL-MCNC: 2.2 MG/DL — SIGNIFICANT CHANGE UP (ref 1.6–2.6)
MANUAL SMEAR VERIFICATION: SIGNIFICANT CHANGE UP
MCHC RBC-ENTMCNC: 30.7 PG — SIGNIFICANT CHANGE UP (ref 27–34)
MCHC RBC-ENTMCNC: 31.9 G/DL — LOW (ref 32–36)
MCHC RBC-ENTMCNC: 31.9 PG — SIGNIFICANT CHANGE UP (ref 27–34)
MCHC RBC-ENTMCNC: 32.3 G/DL — SIGNIFICANT CHANGE UP (ref 32–36)
MCV RBC AUTO: 96.5 FL — SIGNIFICANT CHANGE UP (ref 80–100)
MCV RBC AUTO: 98.7 FL — SIGNIFICANT CHANGE UP (ref 80–100)
METAMYELOCYTES # FLD: 1 % — HIGH (ref 0–0)
METAMYELOCYTES NFR BLD: 1 % — HIGH (ref 0–0)
MICROCYTES BLD QL: SLIGHT — SIGNIFICANT CHANGE UP
MONOCYTES # BLD AUTO: 6.4 K/UL — HIGH (ref 0–0.9)
MONOCYTES # BLD AUTO: 6.47 K/UL — HIGH (ref 0–0.9)
MONOCYTES NFR BLD AUTO: 15 % — HIGH (ref 2–14)
MONOCYTES NFR BLD AUTO: 20 % — HIGH (ref 2–14)
MYELOCYTES NFR BLD: 1 % — HIGH (ref 0–0)
NEUTROPHILS # BLD AUTO: 23.92 K/UL — HIGH (ref 1.8–7.4)
NEUTROPHILS # BLD AUTO: 34.98 K/UL — HIGH (ref 1.8–7.4)
NEUTROPHILS NFR BLD AUTO: 71 % — SIGNIFICANT CHANGE UP (ref 43–77)
NEUTROPHILS NFR BLD AUTO: 77 % — SIGNIFICANT CHANGE UP (ref 43–77)
NEUTS BAND # BLD: 3 % — SIGNIFICANT CHANGE UP (ref 0–8)
NEUTS BAND NFR BLD: 3 % — SIGNIFICANT CHANGE UP (ref 0–8)
NITRITE UR-MCNC: NEGATIVE — SIGNIFICANT CHANGE UP
NRBC # BLD: 1 /100 WBCS — HIGH (ref 0–0)
NRBC BLD AUTO-RTO: SIGNIFICANT CHANGE UP /100 WBCS (ref 0–0)
NRBC BLD AUTO-RTO: SIGNIFICANT CHANGE UP /100 WBCS (ref 0–0)
NRBC BLD-RTO: 1 /100 WBCS — HIGH (ref 0–0)
NT-PROBNP SERPL-SCNC: 6599 PG/ML — HIGH (ref 0–450)
OVALOCYTES BLD QL SMEAR: SLIGHT — SIGNIFICANT CHANGE UP
PH UR: 5 — SIGNIFICANT CHANGE UP (ref 5–8)
PHOSPHATE SERPL-MCNC: 4.4 MG/DL — SIGNIFICANT CHANGE UP (ref 2.5–4.5)
PLAT MORPH BLD: NORMAL — SIGNIFICANT CHANGE UP
PLATELET # BLD AUTO: 153 K/UL — SIGNIFICANT CHANGE UP (ref 150–400)
PLATELET # BLD AUTO: 200 K/UL — SIGNIFICANT CHANGE UP (ref 150–400)
POIKILOCYTOSIS BLD QL AUTO: SLIGHT — SIGNIFICANT CHANGE UP
POTASSIUM SERPL-MCNC: 4 MMOL/L — SIGNIFICANT CHANGE UP (ref 3.5–5.3)
POTASSIUM SERPL-MCNC: 4.1 MMOL/L — SIGNIFICANT CHANGE UP (ref 3.5–5.3)
POTASSIUM SERPL-SCNC: 4 MMOL/L — SIGNIFICANT CHANGE UP (ref 3.5–5.3)
POTASSIUM SERPL-SCNC: 4.1 MMOL/L — SIGNIFICANT CHANGE UP (ref 3.5–5.3)
PROCALCITONIN SERPL-MCNC: 13.61 NG/ML — HIGH
PROT SERPL-MCNC: 5.9 G/DL — LOW (ref 6–8.3)
PROT SERPL-MCNC: 6.3 G/DL — SIGNIFICANT CHANGE UP (ref 6–8.3)
PROT UR-MCNC: SIGNIFICANT CHANGE UP MG/DL
PROTHROM AB SERPL-ACNC: 26.2 SEC — HIGH (ref 9.9–13.4)
PROTHROM AB SERPL-ACNC: 26.3 SEC — HIGH (ref 9.9–13.4)
RBC # BLD: 2.35 M/UL — LOW (ref 3.8–5.2)
RBC # BLD: 2.83 M/UL — LOW (ref 3.8–5.2)
RBC # FLD: 21.9 % — HIGH (ref 10.3–14.5)
RBC # FLD: 23.3 % — HIGH (ref 10.3–14.5)
RBC BLD AUTO: ABNORMAL
RBC CASTS # UR COMP ASSIST: 0 /HPF — SIGNIFICANT CHANGE UP (ref 0–4)
RSV RNA NPH QL NAA+NON-PROBE: SIGNIFICANT CHANGE UP
SARS-COV-2 RNA SPEC QL NAA+PROBE: SIGNIFICANT CHANGE UP
SODIUM SERPL-SCNC: 134 MMOL/L — LOW (ref 135–145)
SODIUM SERPL-SCNC: 137 MMOL/L — SIGNIFICANT CHANGE UP (ref 135–145)
SOURCE RESPIRATORY: SIGNIFICANT CHANGE UP
SP GR SPEC: 1.02 — SIGNIFICANT CHANGE UP (ref 1–1.03)
SPECIMEN SOURCE: SIGNIFICANT CHANGE UP
TROPONIN I, HIGH SENSITIVITY RESULT: 24.7 NG/L — SIGNIFICANT CHANGE UP
TROPONIN I, HIGH SENSITIVITY RESULT: 32.8 NG/L — SIGNIFICANT CHANGE UP
UROBILINOGEN FLD QL: 0.2 MG/DL — SIGNIFICANT CHANGE UP (ref 0.2–1)
VARIANT LYMPHS # BLD: 1 % — SIGNIFICANT CHANGE UP (ref 0–6)
VARIANT LYMPHS NFR BLD MANUAL: 1 % — SIGNIFICANT CHANGE UP (ref 0–6)
WBC # BLD: 32.33 K/UL — HIGH (ref 3.8–10.5)
WBC # BLD: 42.66 K/UL — CRITICAL HIGH (ref 3.8–10.5)
WBC # FLD AUTO: 32.33 K/UL — HIGH (ref 3.8–10.5)
WBC # FLD AUTO: 42.66 K/UL — CRITICAL HIGH (ref 3.8–10.5)
WBC UR QL: 4 /HPF — SIGNIFICANT CHANGE UP (ref 0–5)

## 2025-06-08 PROCEDURE — 99291 CRITICAL CARE FIRST HOUR: CPT

## 2025-06-08 PROCEDURE — 71045 X-RAY EXAM CHEST 1 VIEW: CPT | Mod: 26

## 2025-06-08 PROCEDURE — 93010 ELECTROCARDIOGRAM REPORT: CPT

## 2025-06-08 PROCEDURE — 93308 TTE F-UP OR LMTD: CPT | Mod: 26

## 2025-06-08 PROCEDURE — 76604 US EXAM CHEST: CPT | Mod: 26

## 2025-06-08 RX ORDER — MIDODRINE HYDROCHLORIDE 5 MG/1
10 TABLET ORAL ONCE
Refills: 0 | Status: COMPLETED | OUTPATIENT
Start: 2025-06-08 | End: 2025-06-08

## 2025-06-08 RX ORDER — AZITHROMYCIN 250 MG
500 CAPSULE ORAL ONCE
Refills: 0 | Status: COMPLETED | OUTPATIENT
Start: 2025-06-08 | End: 2025-06-08

## 2025-06-08 RX ORDER — MONTELUKAST SODIUM 10 MG/1
1 TABLET ORAL
Refills: 0 | DISCHARGE

## 2025-06-08 RX ORDER — NOREPINEPHRINE BITARTRATE 8 MG
0.05 SOLUTION INTRAVENOUS
Qty: 8 | Refills: 0 | Status: DISCONTINUED | OUTPATIENT
Start: 2025-06-08 | End: 2025-06-09

## 2025-06-08 RX ORDER — PIPERACILLIN-TAZO-DEXTROSE,ISO 3.375G/5
3.38 IV SOLUTION, PIGGYBACK PREMIX FROZEN(ML) INTRAVENOUS ONCE
Refills: 0 | Status: COMPLETED | OUTPATIENT
Start: 2025-06-08 | End: 2025-06-08

## 2025-06-08 RX ORDER — PREDNISONE 20 MG/1
1 TABLET ORAL
Refills: 0 | DISCHARGE

## 2025-06-08 RX ORDER — MAGNESIUM SULFATE 500 MG/ML
2 SYRINGE (ML) INJECTION ONCE
Refills: 0 | Status: COMPLETED | OUTPATIENT
Start: 2025-06-08 | End: 2025-06-08

## 2025-06-08 RX ORDER — AZITHROMYCIN 250 MG
500 CAPSULE ORAL EVERY 24 HOURS
Refills: 0 | Status: DISCONTINUED | OUTPATIENT
Start: 2025-06-09 | End: 2025-06-11

## 2025-06-08 RX ORDER — ACETAMINOPHEN 500 MG/5ML
1000 LIQUID (ML) ORAL ONCE
Refills: 0 | Status: COMPLETED | OUTPATIENT
Start: 2025-06-08 | End: 2025-06-08

## 2025-06-08 RX ORDER — AMIODARONE HYDROCHLORIDE 50 MG/ML
0.5 INJECTION, SOLUTION INTRAVENOUS
Refills: 0 | DISCHARGE

## 2025-06-08 RX ORDER — VANCOMYCIN HCL IN 5 % DEXTROSE 1.5G/250ML
1000 PLASTIC BAG, INJECTION (ML) INTRAVENOUS ONCE
Refills: 0 | Status: COMPLETED | OUTPATIENT
Start: 2025-06-08 | End: 2025-06-08

## 2025-06-08 RX ORDER — SODIUM CHLORIDE 9 G/1000ML
1000 INJECTION, SOLUTION INTRAVENOUS
Refills: 0 | Status: COMPLETED | OUTPATIENT
Start: 2025-06-08 | End: 2025-06-09

## 2025-06-08 RX ORDER — AMIODARONE HYDROCHLORIDE 50 MG/ML
100 INJECTION, SOLUTION INTRAVENOUS DAILY
Refills: 0 | Status: DISCONTINUED | OUTPATIENT
Start: 2025-06-08 | End: 2025-06-16

## 2025-06-08 RX ORDER — MIDODRINE HYDROCHLORIDE 5 MG/1
10 TABLET ORAL EVERY 8 HOURS
Refills: 0 | Status: DISCONTINUED | OUTPATIENT
Start: 2025-06-08 | End: 2025-06-16

## 2025-06-08 RX ORDER — LEFLUNOMIDE 10 MG/1
1 TABLET ORAL
Refills: 0 | DISCHARGE

## 2025-06-08 RX ORDER — GABAPENTIN 400 MG/1
400 CAPSULE ORAL EVERY 12 HOURS
Refills: 0 | Status: DISCONTINUED | OUTPATIENT
Start: 2025-06-08 | End: 2025-06-16

## 2025-06-08 RX ORDER — AZITHROMYCIN 250 MG
CAPSULE ORAL
Refills: 0 | Status: DISCONTINUED | OUTPATIENT
Start: 2025-06-08 | End: 2025-06-11

## 2025-06-08 RX ORDER — HYDROCORTISONE 20 MG
50 TABLET ORAL EVERY 6 HOURS
Refills: 0 | Status: DISCONTINUED | OUTPATIENT
Start: 2025-06-08 | End: 2025-06-10

## 2025-06-08 RX ORDER — PIPERACILLIN-TAZO-DEXTROSE,ISO 3.375G/5
3.38 IV SOLUTION, PIGGYBACK PREMIX FROZEN(ML) INTRAVENOUS EVERY 8 HOURS
Refills: 0 | Status: COMPLETED | OUTPATIENT
Start: 2025-06-08 | End: 2025-06-15

## 2025-06-08 RX ORDER — TIOTROPIUM BROMIDE INHALATION SPRAY 3.12 UG/1
2 SPRAY, METERED RESPIRATORY (INHALATION) DAILY
Refills: 0 | Status: DISCONTINUED | OUTPATIENT
Start: 2025-06-08 | End: 2025-06-16

## 2025-06-08 RX ORDER — MONTELUKAST SODIUM 10 MG/1
10 TABLET ORAL DAILY
Refills: 0 | Status: DISCONTINUED | OUTPATIENT
Start: 2025-06-08 | End: 2025-06-16

## 2025-06-08 RX ORDER — INSULIN LISPRO 100 U/ML
INJECTION, SOLUTION INTRAVENOUS; SUBCUTANEOUS EVERY 6 HOURS
Refills: 0 | Status: DISCONTINUED | OUTPATIENT
Start: 2025-06-08 | End: 2025-06-09

## 2025-06-08 RX ADMIN — MIDODRINE HYDROCHLORIDE 10 MILLIGRAM(S): 5 TABLET ORAL at 12:32

## 2025-06-08 RX ADMIN — NOREPINEPHRINE BITARTRATE 6.25 MICROGRAM(S)/KG/MIN: 8 SOLUTION at 14:35

## 2025-06-08 RX ADMIN — SODIUM CHLORIDE 50 MILLILITER(S): 9 INJECTION, SOLUTION INTRAVENOUS at 18:51

## 2025-06-08 RX ADMIN — Medication 250 MILLIGRAM(S): at 12:35

## 2025-06-08 RX ADMIN — Medication 200 GRAM(S): at 11:57

## 2025-06-08 RX ADMIN — Medication 500 MILLILITER(S): at 12:32

## 2025-06-08 RX ADMIN — Medication 50 MILLIGRAM(S): at 13:54

## 2025-06-08 RX ADMIN — Medication 250 MILLIGRAM(S): at 13:54

## 2025-06-08 RX ADMIN — Medication 400 MILLIGRAM(S): at 11:37

## 2025-06-08 RX ADMIN — Medication 50 MILLIGRAM(S): at 23:28

## 2025-06-08 RX ADMIN — Medication 1000 MILLIGRAM(S): at 12:37

## 2025-06-08 RX ADMIN — Medication 25 GRAM(S): at 17:18

## 2025-06-08 RX ADMIN — Medication 50 MILLIGRAM(S): at 17:18

## 2025-06-08 RX ADMIN — Medication 25 GRAM(S): at 23:29

## 2025-06-08 RX ADMIN — GABAPENTIN 400 MILLIGRAM(S): 400 CAPSULE ORAL at 17:18

## 2025-06-08 NOTE — CONSULT NOTE ADULT - ASSESSMENT
Assessment:     84y old Female with stated hx significant for afib on eliquis, COPD, CKD3, aplastic anemia, polymyalgia rheumatica on pred qd, HTN, HLD, HFpEF, recent hospitalization for ADHF/COPD exacerbation who presented from [facility] facility with general malaise, Rt sided chest pain, lethargy. Pt found to have fever, leukocytosis, elevated INR, KIMBERLY on CKD CXR with RML consolidation concerning for pneum        Consult called for Hypotension in setting of possible RML pneum      Plan:    ####Neuro####  #==AOx3  -Neuro checks q 4 hrs and prn for changes  -activity as tolerated  -physical therapy consult when stable    ####Pulm####  #==RML pneum  #==COPD hx  #==Rt sided chest pain - most likely costochondral   -Supplemental O2 prn to maintain SPO2 > 92%  -Bronchodilators q 6 hrs prn for sob/wheezes  -HOB >/= 30 degree angle  -home med of Advair 500/50 q 12 hrs - continue  -tylenol prn pain    ####CV####  #==Hypotn  #==HFpEF hx  #==Afib hx  #==diastolic dysfx grad 1 hx  -ECG now and q am x3  -Cardiac Enzymes now and q 8 hrs x 3  -Obtain TTE to eval RVFx/LVFx and/or for progression of disease  -POCUS good LVFx, RV<LV, effacement appreciated in parasternal long and short VTI 25, IVC 1.7 with > 50% variability  -Lactated Ringers 500cc's fluid bolus now  -home med midodrine 10 mg po q 8 hrs - continue  -home med eliquis 2.5 mg po qd - will continue  -home med amiodarone 100 mg po qd - continue    ####GI/####  #==KIMBERLY on CKD3 - most likely pre-renal  -NPO at present  -strict I & O's - keep even    ####ID####  #==RML pneum  #==leukocytosis  -SARS-CoV-2 6/8/25 - ND  -Influ A/B 6/8/25 - ND  -RSV 6/8/25 -ND  -obtain urine legionella   -obtain strep pneumonia Ag  -obtain MRSA/MSSA PCR  -continue zoysn - started in E.D. 6/8/25  -received vanco 1 gm IV x1 6/8/25  -add azithromycin 500 mg IV q day x 3    ####FEN/ENDO/HEME####  #==elevated INR in setting of sepsis  #==polymyalgia rheumatica hx  #==aplastic anemia hx  -obtain CMP/Mg++/PO--4/CBC with diff/PT/PTT/INR q. a.m. and prn  -abg prn  -obtain stat cortisol level  -continue prednisone 5 mg po qd - consider change to hydrocortisone 50 mg IV q 6 hrs   -POC glucose q 6 hrs with ISS - maintain glucose 160-200  -home med leflumonide 10 mg qd - continue  -T&CM prbc's  -transfuse PRBC to maintain Hgb >/= 7.0-7.5  -DVT prophylaxis with - PAS stockings at present in setting of elevated INR    ####LINES/DRAINS/TUBES/DEVICES####      Critical Care Time: 40minutes        Assessment:     84y old Female with stated hx significant for afib on eliquis, COPD, CKD3, aplastic anemia, polymyalgia rheumatica on pred qd, HTN, HLD, HFpEF, recent hospitalization for ADHF/COPD exacerbation who presented from [facility] facility with general malaise, Rt sided chest pain, lethargy. Pt found to have fever, leukocytosis, elevated INR, KIMBERLY on CKD CXR with RML consolidation concerning for pneum        Consult called for Hypotension in setting of possible RML pneum      Plan:    ####Neuro####  #==AOx3  -Neuro checks q 4 hrs and prn for changes  -activity as tolerated  -physical therapy consult when stable    ####Pulm####  #==RML pneum  #==COPD hx  #==Rt sided chest pain - most likely costochondral   -Supplemental O2 prn to maintain SPO2 > 92%  -Bronchodilators q 6 hrs prn for sob/wheezes  -HOB >/= 30 degree angle  -home med of Advair 500/50 q 12 hrs - continue  -home med tiotropium - continue   -tylenol prn pain    ####CV####  #==Hypotn  #==HFpEF hx  #==Afib hx  #==diastolic dysfx grad 1 hx  -ECG now and q am x3  -Cardiac Enzymes now and q 8 hrs x 3  -Obtain TTE to eval RVFx/LVFx and/or for progression of disease  -POCUS good LVFx, RV<LV, effacement appreciated in parasternal long and short VTI 25, IVC 1.7 with > 50% variability  -Lactated Ringers 500cc's fluid bolus now  -home med midodrine 10 mg po q 8 hrs - continue  -home med eliquis 2.5 mg po qd - will continue  -home med amiodarone 100 mg po qd - continue    ####GI/####  #==KIMBERLY on CKD3 - most likely pre-renal  -NPO at present  -strict I & O's - keep even    ####ID####  #==RML pneum  #==leukocytosis  -SARS-CoV-2 6/8/25 - ND  -Influ A/B 6/8/25 - ND  -RSV 6/8/25 -ND  -obtain urine legionella   -obtain strep pneumonia Ag  -obtain MRSA/MSSA PCR  -continue zoysn - started in E.D. 6/8/25  -received vanco 1 gm IV x1 6/8/25  -add azithromycin 500 mg IV q day x 3    ####FEN/ENDO/HEME####  #==elevated INR in setting of sepsis  #==polymyalgia rheumatica hx  #==aplastic anemia hx  -obtain CMP/Mg++/PO--4/CBC with diff/PT/PTT/INR q. a.m. and prn  -abg prn  -obtain stat cortisol level  -continue prednisone 5 mg po qd - consider change to hydrocortisone 50 mg IV q 6 hrs   -POC glucose q 6 hrs with ISS - maintain glucose 160-200  -home med leflumonide 10 mg qd - continue  -T&CM prbc's  -transfuse PRBC to maintain Hgb >/= 7.0-7.5  -DVT prophylaxis with - PAS stockings at present in setting of elevated INR    ####LINES/DRAINS/TUBES/DEVICES####      Critical Care Time: 40minutes        Assessment:     84y old Female with stated hx significant for afib on eliquis, COPD, CKD3, aplastic anemia, polymyalgia rheumatica on pred qd, HTN, HLD, HFpEF, recent hospitalization for ADHF/COPD exacerbation who presented from [facility] facility with general malaise, Rt sided chest pain, lethargy. Pt found to have fever, leukocytosis, elevated INR, KIMBERLY on CKD CXR with RML consolidation concerning for pneum        Consult called for Hypotension in setting of possible RML pneum      Plan:    ####Neuro####  #==AOx3  -Neuro checks q 4 hrs and prn for changes  -activity as tolerated  -physical therapy consult when stable  -GOC - full code    ####Pulm####  #==RML pneum  #==COPD hx  #==Rt sided chest pain - most likely costochondral   -Supplemental O2 prn to maintain SPO2 > 92%  -Bronchodilators q 6 hrs prn for sob/wheezes  -HOB >/= 30 degree angle  -home med of Advair 500/50 q 12 hrs - continue  -home med tiotropium - continue   -tylenol prn pain    ####CV####  #==Hypotn  #==HFpEF hx  #==Afib hx  #==diastolic dysfx grad 1 hx  -ECG now and q am x3  -Cardiac Enzymes now and q 8 hrs x 3  -Obtain TTE to eval RVFx/LVFx and/or for progression of disease  -POCUS good LVFx, RV<LV, effacement appreciated in parasternal long and short VTI 25, IVC 1.7 with > 50% variability  -Lactated Ringers 500cc's fluid bolus now  -home med midodrine 10 mg po q 8 hrs - continue  -home med eliquis 2.5 mg po qd - will continue  -home med amiodarone 100 mg po qd - continue    ####GI/####  #==KIMBERLY on CKD3 - most likely pre-renal  -NPO at present  -strict I & O's - keep even    ####ID####  #==RML pneum  #==leukocytosis  -SARS-CoV-2 6/8/25 - ND  -Influ A/B 6/8/25 - ND  -RSV 6/8/25 -ND  -obtain urine legionella   -obtain strep pneumonia Ag  -obtain MRSA/MSSA PCR  -continue zoysn - started in E.D. 6/8/25  -received vanco 1 gm IV x1 6/8/25  -add azithromycin 500 mg IV q day x 3    ####FEN/ENDO/HEME####  #==elevated INR in setting of sepsis  #==polymyalgia rheumatica hx  #==aplastic anemia hx  -obtain CMP/Mg++/PO--4/CBC with diff/PT/PTT/INR q. a.m. and prn  -abg prn  -obtain stat cortisol level  -continue prednisone 5 mg po qd - consider change to hydrocortisone 50 mg IV q 6 hrs   -POC glucose q 6 hrs with ISS - maintain glucose 160-200  -home med leflumonide 10 mg qd - continue  -T&CM prbc's  -transfuse PRBC to maintain Hgb >/= 7.0-7.5  -DVT prophylaxis with - PAS stockings at present in setting of elevated INR    ####LINES/DRAINS/TUBES/DEVICES####      Critical Care Time: 40minutes        Assessment:     84y old Female with stated hx significant for afib on eliquis, COPD, CKD3, aplastic anemia, polymyalgia rheumatica on pred qd, HTN, HLD, HFpEF, recent hospitalization for ADHF/COPD exacerbation who presented from [facility] facility with general malaise, Rt sided chest pain, lethargy. Pt found to have fever, leukocytosis, elevated INR, KIMBERLY on CKD CXR with RML consolidation concerning for pneum        Consult called for Hypotension in setting of possible RML pneum      Plan:    ####Neuro####  #==AOx3  -Neuro checks q 4 hrs and prn for changes  -activity as tolerated  -physical therapy consult when stable  -GOC - full code    ####Pulm####  #==RML pneum  #==COPD hx  #==Rt sided chest pain - most likely costochondral   -Supplemental O2 prn to maintain SPO2 > 92%  -Bronchodilators q 6 hrs prn for sob/wheezes  -HOB >/= 30 degree angle  -home med of Advair 500/50 q 12 hrs - continue  -home med tiotropium - continue   -tylenol prn pain    ####CV####  #==Hypotn  #==HFpEF hx  #==Afib hx  #==diastolic dysfx grad 1 hx  -ECG now and q am x3  -Cardiac Enzymes now and q 8 hrs x 3  -Obtain TTE to eval RVFx/LVFx and/or for progression of disease  -POCUS good LVFx, RV<LV, effacement appreciated in parasternal long and short VTI 25, IVC 1.7 with > 50% variability  -Lactated Ringers 500cc's fluid bolus now  -home med midodrine 10 mg po q 8 hrs - continue  -home med eliquis 2.5 mg po qd - will continue in a.m.  -home med amiodarone 100 mg po qd - continue    ####GI/####  #==KIMBERLY on CKD3 - most likely pre-renal  -NPO at present  -strict I & O's - keep even    ####ID####  #==RML pneum  #==leukocytosis  -SARS-CoV-2 6/8/25 - ND  -Influ A/B 6/8/25 - ND  -RSV 6/8/25 -ND  -obtain urine legionella   -obtain strep pneumonia Ag  -obtain MRSA/MSSA PCR  -continue zoysn - started in E.D. 6/8/25  -received vanco 1 gm IV x1 6/8/25  -add azithromycin 500 mg IV q day x 3    ####FEN/ENDO/HEME####  #==elevated INR in setting of sepsis  #==polymyalgia rheumatica hx  #==aplastic anemia hx  -obtain CMP/Mg++/PO--4/CBC with diff/PT/PTT/INR q. a.m. and prn  -abg prn  -obtain stat cortisol level  -continue prednisone 5 mg po qd - consider change to hydrocortisone 50 mg IV q 6 hrs   -POC glucose q 6 hrs with ISS - maintain glucose 160-200  -home med leflumonide 10 mg qd - continue  -T&CM prbc's  -transfuse PRBC to maintain Hgb >/= 7.0-7.5  -DVT prophylaxis with - PAS stockings at present in setting of elevated INR    ####LINES/DRAINS/TUBES/DEVICES####      Critical Care Time: 40minutes

## 2025-06-08 NOTE — CONSULT NOTE ADULT - SUBJECTIVE AND OBJECTIVE BOX
CHIEF COMPLAINT/ REASON FOR VISIT  .. Patient was seen to address the  issue listed under PROBLEM LIST which is located toward bottom of this note     MARTINEZ PATEL APER 13    Allergies    No Known Allergies    Intolerances        PAST MEDICAL & SURGICAL HISTORY:  COPD (chronic obstructive pulmonary disease)      DM (diabetes mellitus)  diet-controlled      PAF (paroxysmal atrial fibrillation)  s/p ablation      Hiatal hernia      H/O aplastic anemia      History of IBS      H/O osteoporosis      HLD (hyperlipidemia)      PMR (polymyalgia rheumatica)      History of basal cell cancer      Chronic kidney disease (CKD)      Hypotension      Presence of IVC filter      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      History of immunodeficiency      Lumbar compression fracture      H/O hiatal hernia      H/O pulmonary fibrosis      H/O sinus bradycardia      History of fracture of right hip  "unoperable"      S/P hysterectomy      S/P foot surgery      S/P knee surgery      After cataract  bilateral eye cataract surgically removed      Closed right hip fracture  rigth hip ORIF july 19 2016      S/P IVC filter  nov 2016      S/P carpal tunnel release  Right 2008 & 6/27/17      H/O kyphoplasty      Port-A-Cath in place  right chest          FAMILY HISTORY:  Family history of bladder cancer (Mother)    Family history of myocardial infarction (Father)    FH: atrial fibrillation (Father)        Home Medications:  amiodarone 200 mg oral tablet: 0.5 tab(s) orally once a day (27 May 2025 13:21)  Bentyl 10 mg oral capsule: 1 cap(s) orally 2 times a day, As Needed (27 May 2025 13:22)  Eliquis 2.5 mg oral tablet: 1 tab(s) orally 2 times a day (27 May 2025 12:39)  esomeprazole 20 mg oral delayed release capsule: 1 cap(s) orally once a day (27 May 2025 13:23)  fluticasone-salmeterol 500 mcg-50 mcg/inh inhalation powder: 1 puff(s) inhaled 2 times a day (05 Jun 2025 11:18)  folic acid 1 mg oral tablet: 1 tab(s) orally once a day (in the morning) (27 May 2025 12:39)  gabapentin 400 mg oral capsule: 1 cap(s) orally 2 times a day (27 May 2025 12:39)  ipratropium-albuterol 0.5 mg-2.5 mg/3 mL inhalation solution: 3 milliliter(s) inhaled every 6 hours As needed Shortness of Breath and/or Wheezing (05 Jun 2025 11:18)  IVIG monthly:  (27 May 2025 12:39)  leflunomide 10 mg oral tablet: 1 tab(s) orally once a day (27 May 2025 13:23)  midodrine 10 mg oral tablet: 1 tab(s) orally every 8 hours (05 Jun 2025 11:18)  montelukast 10 mg oral tablet: 1 tab(s) orally once a day (27 May 2025 13:23)  pantoprazole 40 mg oral delayed release tablet: 1 tab(s) orally once a day (before a meal) (05 Jun 2025 11:18)  predniSONE 5 mg oral tablet: 1 tab(s) orally once a day (27 May 2025 13:22)  Procrit 10,000 units/mL preservative-free injectable solution: injectable once a week WEDNESDAY (27 May 2025 12:39)  Reclast Infusion , IV x 1 yearly:  (27 May 2025 13:22)  simvastatin 10 mg oral tablet: 1 tab(s) orally once a day (at bedtime) (27 May 2025 12:39)  tiotropium 2.5 mcg/inh inhalation aerosol: 2 puff(s) inhaled once a day (05 Jun 2025 11:18)  tiZANidine: 2 tab(s) orally once a day (at bedtime) as needed for  muscle spasm (27 May 2025 13:22)  torsemide 20 mg oral tablet: 1 tab(s) orally once a day (in the morning) (27 May 2025 12:39)      MEDICATIONS  (STANDING):  aMIOdarone    Tablet 100 milliGRAM(s) Oral daily  azithromycin  IVPB      chlorhexidine 2% Cloths 1 Application(s) Topical <User Schedule>  fluticasone propionate/ salmeterol 500-50 MICROgram(s) Diskus 1 Dose(s) Inhalation two times a day  gabapentin 400 milliGRAM(s) Oral every 12 hours  hydrocortisone sodium succinate Injectable 50 milliGRAM(s) IV Push every 6 hours  insulin lispro (ADMELOG) corrective regimen sliding scale   SubCutaneous every 6 hours  magnesium sulfate  IVPB 2 Gram(s) IV Intermittent once  midodrine 10 milliGRAM(s) Oral every 8 hours  norepinephrine Infusion 0.05 MICROgram(s)/kG/Min (6.25 mL/Hr) IV Continuous <Continuous>  pantoprazole  Injectable 40 milliGRAM(s) IV Push daily  piperacillin/tazobactam IVPB.. 3.375 Gram(s) IV Intermittent every 8 hours  tiotropium 2.5 MICROgram(s) Inhaler 2 Puff(s) Inhalation daily    MEDICATIONS  (PRN):              Vital Signs Last 24 Hrs  T(C): 37.2 (08 Jun 2025 12:44), Max: 38.3 (08 Jun 2025 11:30)  T(F): 98.9 (08 Jun 2025 12:44), Max: 101 (08 Jun 2025 11:30)  HR: 62 (08 Jun 2025 14:25) (58 - 71)  BP: 84/34 (08 Jun 2025 14:25) (72/47 - 85/35)  BP(mean): --  RR: 18 (08 Jun 2025 14:25) (16 - 18)  SpO2: 99% (08 Jun 2025 14:25) (99% - 99%)    Parameters below as of 08 Jun 2025 14:25  Patient On (Oxygen Delivery Method): nasal cannula  O2 Flow (L/min): 3                LABS:                        7.5    32.33 )-----------( 153      ( 08 Jun 2025 11:20 )             23.2     06-08    137  |  99  |  72[H]  ----------------------------<  97  4.0   |  29  |  1.90[H]    Ca    8.6      08 Jun 2025 11:20  Mg     1.4     06-08    TPro  6.3  /  Alb  3.4  /  TBili  1.1  /  DBili  x   /  AST  11[L]  /  ALT  35  /  AlkPhos  39[L]  06-08    PT/INR - ( 08 Jun 2025 11:20 )   PT: 26.3 sec;   INR: 2.27 ratio         PTT - ( 08 Jun 2025 11:20 )  PTT:34.5 sec  Urinalysis Basic - ( 08 Jun 2025 11:20 )    Color: x / Appearance: x / SG: x / pH: x  Gluc: 97 mg/dL / Ketone: x  / Bili: x / Urobili: x   Blood: x / Protein: x / Nitrite: x   Leuk Esterase: x / RBC: x / WBC x   Sq Epi: x / Non Sq Epi: x / Bacteria: x            WBC:  WBC Count: 32.33 K/uL (06-08 @ 11:20)  WBC Count: 5.23 K/uL (06-05 @ 08:00)      MICROBIOLOGY:  RECENT CULTURES:        CARDIAC MARKERS ( 08 Jun 2025 11:20 )  x     / x     / x     / x     / <1.0 ng/mL        PT/INR - ( 08 Jun 2025 11:20 )   PT: 26.3 sec;   INR: 2.27 ratio         PTT - ( 08 Jun 2025 11:20 )  PTT:34.5 sec    Sodium:  Sodium: 137 mmol/L (06-08 @ 11:20)  Sodium: 139 mmol/L (06-05 @ 08:00)      1.90 mg/dL 06-08 @ 11:20  1.40 mg/dL 06-05 @ 08:00      Hemoglobin:  Hemoglobin: 7.5 g/dL (06-08 @ 11:20)  Hemoglobin: 8.8 g/dL (06-05 @ 08:00)      Platelets: Platelet Count - Automated: 153 K/uL (06-08 @ 11:20)  Platelet Count - Automated: 186 K/uL (06-05 @ 08:00)      LIVER FUNCTIONS - ( 08 Jun 2025 11:20 )  Alb: 3.4 g/dL / Pro: 6.3 g/dL / ALK PHOS: 39 U/L / ALT: 35 U/L / AST: 11 U/L / GGT: x             Urinalysis Basic - ( 08 Jun 2025 11:20 )    Color: x / Appearance: x / SG: x / pH: x  Gluc: 97 mg/dL / Ketone: x  / Bili: x / Urobili: x   Blood: x / Protein: x / Nitrite: x   Leuk Esterase: x / RBC: x / WBC x   Sq Epi: x / Non Sq Epi: x / Bacteria: x        RADIOLOGY & ADDITIONAL STUDIES:      MICROBIOLOGY:  RECENT CULTURES:

## 2025-06-08 NOTE — CONSULT NOTE ADULT - ASSESSMENT
Initial evaluation/Pulmonary Critical Care Consultation requested by  DR GALLEGOS on 6/8/2025 from Dr Jonathan Baxter    Patient examined chart reviewed    HOSPITAL ADMISSION   PATIENT CAME  FROM (if information available)      REASON FOR VISIT  .. Management of problems listed below      CC.   . 6/8/2025 brought in by ems for right sided chest pain that started at 3am- nonradiating- patient was offered aspirin by ems- patient refused and stated to ems that she is on plavix and was advised not to take aspirin. patient on 43 litres nasl cannula-     OVERALL PRESENTATION.  . 6/8/2025 84 yr old female with PMHx afib on eliquis, COPD (not on home O2), PE/Rt lower extremity DVT 2017, Rt LE IVC filter 2017 CKD3, aplastic anemia, polymyalgia rheumatica on prednisone 5 mg po qd, requiring PRBC transfusions ~8 weeks (via Rt subclavian mediport), AVN bilat hips, HTN, HLD, HFpEF (75% 6.2.25), diastolic dysfx grade1, recent hospitalization 5/25/25-6/5/25 for ADHF/COPD exacerbation, Pt with eventual improvement and transfer to [facility] facility from where she presents to E.D. this a.m. 6/8/25 with c/o Rt sided chest pain. general malaise. Pt stated began to feel ill evening before presentation, daughter this a.m. endorsed pt lethargic, pale.     In E.D. Pt found to have fever with tmax of 101, KIMBERLY on CKD with sCr 1.90 (baseline 1.2-1.6), HYPOtnsive with SBP 80's (pt on midodrine therapy, usual 's). In addition found to have leukocytosis of 37.6 (baseline 5.25 6/5/25), procalcitonin of 13.6, Hgb 7.5, elevated INR 2.27,  Chest xray demonstrated RML consolidations, concerning for pneum. In E.D. pt receiving 500 cc's NS, Vanco 1 gm, zosyn. Pt received tylenol 1 gm IV x1.     PMH.        BEST PRACTICE ISSUES.  . HOB ELEVATN.    .... Yes  . DIET  .   .... DASH 6/8/2025   . FREE WATER.   ....   .  IV FLUID.  .....   . PHARMAC DVT PPLX .    ....   . NON PHARMAC DVT PPLX .      . STRESS ULCR PPLX .   ....   . DATE/DM MGMT.   ..... See under Endocrine section   GENERAL DATA .   . COVID.         .... scv2 6/8/2025 scv2 (-)   . GOC.    ....    . ICU STAY.    .... no   . INFECTION PPLX .   .... CHLORHEXIDINE 2% 6/8/2025   . ALLGY.   ....  nka  . WT.   .... 6/8/2025 69   . BMI.  ....6/8/2025 26      [id number]  VITALS/GAS EXCHANGE/DRIPS    ABGS.     .  VS/ PO/IO/ VENT/ DRIPS.   6/8/2025 99 53 109/40   6/8/2025 4p nore .09 m/k/m     [id number]  PROBLEM ASSESSMENT RECOMMENDATIONS.  RESP.   . GAS EXCHANGE .   .... target PO 90-95%     . COPD   .... ADVAIR 500 6/8/2025   .... MONTELUKAST 10 6/8/2025   .... SPIRIVA 6/8/2025     . RESP FAILURE  .... 6/8/2025 Has ho hypercapnia   .... 6/8/2025 recommend monitor abg      INFECTION.  . DATA  .... pr 6/8/2025 or 13.6   .... esr 6/8/2025 esr 17   .... w 6/8/2025 w 32   .... ua 6/8/2025 w 4   .... cxr 6/8/2025 cxr dense infiltra r upper hilum r mediport  .... flu ab 6/8/2025 (-)   .... rsv 6/8/2025 (-)      . PNEUMONIA RUL 6/8/2025   .... AZITHRO 6/8/2025   .... ZOSYN 6/8/2025     CARDIAC.  . STRESS STEROIDS   .... HYDROCORTISONE   ........ 6/8/2025 h 50.4    . SHOCK   .... MIDODRINE 6/8/2025 m 10.3   .... NOREPI 6/8/2025 n 8/250   .... target map 65 (+)     . CAD    .... Trop 6/8/2025 Tr 32    . LACTICEMIA   .... la 6/8/2025 la 1.4     . CHF  .... pbnp 6/8/2025 pbnp 6599   .... tte 4/2025 mild ph  n vf    . A fib (chronic) POA 6/8/2025  .... AMIODARONE 100 6/8/2025    HEMAT.  . DATA  .... Plt 6/8/2025 plt 153   .... inr 6/8/2025 inr 2.27     . ANEMIA   .... Hb 6/8/2025 Hb 7.5    GI.   . DIET .   .... dash 6/8/2025     . LFT MONITORING   .... LFTS    6/8/2025  ........ AP     39  ........ AST   11  ........ ALT    35    RENAL.  . DATA  .... Na 6/8/2025 Na 137  .... K 6/8/2025 K 4   .... CO2 6/8/2025 co2 29   .... ag 6/8/2025 ag 9  .... alb 6/8/2025 alb 3.4     . KIMBERLY ON CKD   .... Cr 6/8/2025 Cr 1.9  .... Cr 5/27-5/28-5/29-5/30-6/1/2025   .... Cr 1.2 - 1.3 - 1.3 - 1.6 - 1.6    ENDO.  . DM  .  .... 6/8/2025 SL SCALE     NEURO.  . PAIN  .... GABAPENTIN 6/8/2025 g 400.2       [id number]   SUMMARY BASELINE .     84 yr old female pt of Dr Gallegos not on home ox or home cpap used to use trelegy lives with spouse quit 40 y 1/2 ppd with PMHx afib on eliquis, COPD (not on home O2), PE/Rt lower extremity DVT 2017, Rt LE IVC filter 2017 CKD3, aplastic anemia, polymyalgia rheumatica on prednisone 5 mg po qd, requiring PRBC transfusions around 8 weeks (via Rt subclavian mediport), AVN bilat hips, HTN, HLD, HFpEF (75% 6.2.25), diastolic dysfx grade1, recent hospitalization 5/25/25-6/5/25 for ADHF/COPD exacerbation, Pt with eventual improvement and transfer to [facility] facility   CC.   . 6/8/2025 R CHEST PAIN   MAIN ISSUES.  . PNEUMONIA RML 6/8/2025  . SHOCK 6/8/2025   . NOREPI 6/8/2025   . STRESS STEROIDS   .... HYDROCORTISONE 6/8/2025  . A fib (chronic) POA 6/8/2025  . ANEMIA Hb 6/8/2025 Hb 7.5  . KIMBERLY ON CKD Cr 6/8/2025 Cr 1.9  PMH.   . AFon Eliquis,   . COPD (not on home o2),   . CKD,   . aplastic anemia,   . TRANSFUSION 6/2/2025 1 u prbc   . PMR (chronic prednisone with AVN hip),   . HLD,   . HTN,   . DM    PROCEDURES/DEVICES .  PAST PROCEDURES   . r mediport poa 6/8/2025     DISCUSSIONS.  .... Discussed with primary care and relevant consultants on an ongoing basis       TIME SPENT.  . Over 55  minutes aggregate care time spent on encounter; activities included   direct patient care, counseling and/or coordinating care reviewing notes, lab data/ imaging , discussion with multidisciplinary team/ patient  /family and explaining in detail risks, benefits, alternatives  of the recommendations     ROBERT HENRIQUEZ 84 6/8/2025 1942

## 2025-06-08 NOTE — H&P ADULT - PROBLEM SELECTOR PLAN 6
Not on home medications  -advance diet as tolerated   -c/w LDISS   -monitor BG closely while on steroids   -Continue home gabapentin.  -plan per ICU

## 2025-06-08 NOTE — H&P ADULT - PROBLEM SELECTOR PLAN 5
-most likely pre-renal due to hypotension   -Cr 1.90 (baseline 1.2-1.6)  -s/p 500cc NS IVF x1 and LR 500cc bolus x1  -f/u AM CMP    -avoid nephrotoxic meds  -nephro following  -plan per ICU

## 2025-06-08 NOTE — ED PROVIDER NOTE - INTERPRETATION
Department of Anesthesiology  Preprocedure Note       Name:  Alice Fonseca   Age:  50 y.o.  :  1974                                          MRN:  [id number]         Date:  3/11/2025      Surgeon: Surgeon(s):  Darrick Gutierrez MD    Procedure: Procedure(s):  COLONOSCOPY    Medications prior to admission:   Prior to Admission medications    Medication Sig Start Date End Date Taking? Authorizing Provider   Multiple Vitamins-Minerals (MULTIVITAMIN ADULTS PO) Take by mouth daily   Yes Scott Schmidt MD   psyllium (KONSYL) 28.3 % PACK Take 1 packet by mouth daily   Yes ProviderScott MD       Current medications:    Current Facility-Administered Medications   Medication Dose Route Frequency Provider Last Rate Last Admin    sodium chloride flush 0.9 % injection 5-40 mL  5-40 mL IntraVENous 2 times per day Allie Serrano MD        sodium chloride flush 0.9 % injection 5-40 mL  5-40 mL IntraVENous PRN Allie Serrano MD        0.9 % sodium chloride infusion   IntraVENous PRN Allie Serrano MD           Allergies:  No Known Allergies    Problem List:    Patient Active Problem List   Diagnosis Code    Pre-diabetes R73.03    Mixed hyperlipidemia E78.2    Elevated rheumatoid factor R76.8    Elevated erythrocyte sedimentation rate R70.0       Past Medical History:        Diagnosis Date    Prediabetes        Past Surgical History:        Procedure Laterality Date     SECTION      x2       Social History:    Social History     Tobacco Use    Smoking status: Never     Passive exposure: Never    Smokeless tobacco: Never   Substance Use Topics    Alcohol use: Yes     Alcohol/week: 3.0 standard drinks of alcohol     Types: 3 Glasses of wine per week     Comment: occas                                Counseling given: Not Answered      Vital Signs (Current):   Vitals:    25 1025 25 0851   BP:  (!) 141/95   Pulse:  95   Resp:  21   Temp:  98.7 °F (37.1 °C)   TempSrc:  Infrared 
normal sinus rhythm

## 2025-06-08 NOTE — H&P ADULT - ASSESSMENT
84F h/o AFib on Eliquis, COPD, CKD, aplastic anemia, polymyalgia rheumatica on chronic steroids, avascular necrosis of hips, HTN, HLD, with recent admission to [facility] 5/26 - 6/5 for acute HFpEF exacerbation and COPD exacerbation, discharged to rehab on 6/5 and returning today with right sided chest pain, general malaise and feeling unwell. She reports some slight cough though otherwise no other new symptoms reported. Found to be hypotensive, febrile w/ leukocytosis. Admitted to ICU for septic shock likely 2/2 to [facility] and [facility].

## 2025-06-08 NOTE — H&P ADULT - PROBLEM SELECTOR PLAN 9
on home prednisone 5mg daily  and leflunomide (10mg) if restarted Patient will need to bring in home medication to bring to pharmacy to dispense.  -now on IV solumedrol 50mg q6  -rest of plan per ICU

## 2025-06-08 NOTE — ED ADULT TRIAGE NOTE - CHIEF COMPLAINT QUOTE
brought in by ems for right sided chest pain that started at 3am- nonradiating- patient was offered aspirin by ems- patient refused and stated to ems that she is on plavix and was advised not to take aspirin. patient on 43 litres nasl cannula- was recently discharged from the hospital- h/o afib and chf

## 2025-06-08 NOTE — CONSULT NOTE ADULT - SUBJECTIVE AND OBJECTIVE BOX
CHIEF COMPLAINT:    HPI:      84 yr old female with PMHx afib on eliquis, COPD (not on home O2), CKD3, aplastic anemia, polymyalgia rheumatica on prednisone, AVN bilat hips, HTN, HLD, HFpEF (75% 6.2.25), diastolic dysfx grade1, DM2, recent hospitalization 5/25/25-6/2/25 for AHDF/COPD exacerbation. Pt with eventual improvement and transfer to [facility] facility from where she presents to [facility] this a.m. 6/8/25 with c/o Rt sided chest pain. Pt found to have fever with tmax of 101. Chest xray demonstrated RML consolidations, concerning for pneum. Pt in addition found to be Hypotnsive with SBP of 82/48, and to have KIMBERLY on CKD with sCR of 1.9 (baseline 1.20-1.60)          PAST MEDICAL & SURGICAL HISTORY:  COPD (chronic obstructive pulmonary disease)      DM (diabetes mellitus)  diet-controlled      PAF (paroxysmal atrial fibrillation)  s/p ablation      Hiatal hernia      H/O aplastic anemia      History of IBS      H/O osteoporosis      HLD (hyperlipidemia)      PMR (polymyalgia rheumatica)      History of basal cell cancer      Chronic kidney disease (CKD)      Hypotension      Presence of IVC filter      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      History of immunodeficiency      Lumbar compression fracture      H/O hiatal hernia      H/O pulmonary fibrosis      H/O sinus bradycardia      History of fracture of right hip  "unoperable"      S/P hysterectomy      S/P foot surgery      S/P knee surgery      After cataract  bilateral eye cataract surgically removed      Closed right hip fracture  rigth hip ORIF july 19 2016      S/P IVC filter  nov 2016      S/P carpal tunnel release  Right 2008 & 6/27/17      H/O kyphoplasty      Port-A-Cath in place  right chest          FAMILY HISTORY:  Family history of bladder cancer (Mother)    Family history of myocardial infarction (Father)    FH: atrial fibrillation (Father)        SOCIAL HISTORY:  Smoking: [ ] Never Smoked [ ] Former Smoker (__ packs x ___ years) [ ] Current Smoker  (__ packs x ___ years)  Substance Use: [ ] Never Used [ ] Used ____  EtOH Use:  Marital Status: [ ] Single [ ]  [ ]  [ ]   Sexual History:   Occupation:  Recent Travel:  Country of Birth:  Advance Directives:    Allergies    No Known Allergies    Intolerances        HOME MEDICATIONS:    REVIEW OF SYSTEMS:            OBJECTIVE:  ICU Vital Signs Last 24 Hrs  T(C): 38.3 (08 Jun 2025 11:44), Max: 38.3 (08 Jun 2025 11:30)  T(F): 101 (08 Jun 2025 11:44), Max: 101 (08 Jun 2025 11:30)  HR: 65 (08 Jun 2025 11:44) (65 - 71)  BP: 81/45 (08 Jun 2025 11:44) (81/45 - 82/48)  BP(mean): --  ABP: --  ABP(mean): --  RR: 17 (08 Jun 2025 11:44) (16 - 17)  SpO2: 99% (08 Jun 2025 11:44) (99% - 99%)    O2 Parameters below as of 08 Jun 2025 11:44  Patient On (Oxygen Delivery Method): nasal cannula  O2 Flow (L/min): 3            CAPILLARY BLOOD GLUCOSE          PHYSICAL EXAM:        HOSPITAL MEDICATIONS:  MEDICATIONS  (STANDING):  midodrine. 10 milliGRAM(s) Oral once  sodium chloride 0.9% Bolus 500 milliLiter(s) IV Bolus once  vancomycin  IVPB. 1000 milliGRAM(s) IV Intermittent once    MEDICATIONS  (PRN):      LABS:                        7.5    32.33 )-----------( 153      ( 08 Jun 2025 11:20 )             23.2     Hgb Trend: 7.5<--, 8.8<--, 8.5<--, 8.5<--, 7.7<--  06-08    137  |  99  |  72[H]  ----------------------------<  97  4.0   |  29  |  1.90[H]    Ca    8.6      08 Jun 2025 11:20  Mg     1.4     06-08    TPro  6.3  /  Alb  3.4  /  TBili  1.1  /  DBili  x   /  AST  11[L]  /  ALT  35  /  AlkPhos  39[L]  06-08    Creatinine Trend: 1.90<--, 1.40<--, 1.60<--, 1.30<--, 1.50<--, 1.60<--  PT/INR - ( 08 Jun 2025 11:20 )   PT: 26.3 sec;   INR: 2.27 ratio         PTT - ( 08 Jun 2025 11:20 )  PTT:34.5 sec  Urinalysis Basic - ( 08 Jun 2025 11:20 )    Color: x / Appearance: x / SG: x / pH: x  Gluc: 97 mg/dL / Ketone: x  / Bili: x / Urobili: x   Blood: x / Protein: x / Nitrite: x   Leuk Esterase: x / RBC: x / WBC x   Sq Epi: x / Non Sq Epi: x / Bacteria: x            MICROBIOLOGY:     RADIOLOGY:  [ ] Reviewed and interpreted by me    EKG:        Karla ANP-BC (ext. [id number])           CHIEF COMPLAINT:    HPI:      84 yr old female with PMHx afib on eliquis, COPD (not on home O2), CKD3, aplastic anemia, polymyalgia rheumatica on prednisone 5 mg po qd, AVN bilat hips, HTN, HLD, HFpEF (75% 6.2.25), diastolic dysfx grade1, DM2, recent hospitalization 5/25/25-6/5/25 for AHDF/COPD exacerbation. Pt with eventual improvement and transfer to [facility] facility from where she presents to E.D. this a.m. 6/8/25 with c/o Rt sided chest pain. general malaise. Pt stated began to feel ill evening before presentation, daughter this a.m. endorsed pt lethargic, pale.     In E.D. Pt found to have fever with tmax of 101, KIMBERLY on CKD with sCr 1.90 (baseline 1.2-1.6), HYPOtnsive with SBP 80's (pt on midodrine therapy, usual 's). In addition found to have leukocytosis of 37.6 (baseline 5.25 6/5/25), Hgb 7.5, elevated 2.27,  Chest xray demonstrated RML consolidations, concerning for pneum. In E.D. pt receiving 500 cc's NS, Vanco 1 gm, zosyn. Pt received tylenol 1 gm IV x1.       Consult called for Hypotension in setting of possible RML pneum      PAST MEDICAL & SURGICAL HISTORY:  COPD (chronic obstructive pulmonary disease)      DM (diabetes mellitus)  diet-controlled      PAF (paroxysmal atrial fibrillation)  s/p ablation      Hiatal hernia      H/O aplastic anemia      History of IBS      H/O osteoporosis      HLD (hyperlipidemia)      PMR (polymyalgia rheumatica)      History of basal cell cancer      Chronic kidney disease (CKD)      Hypotension      Presence of IVC filter      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      History of immunodeficiency      Lumbar compression fracture      H/O hiatal hernia      H/O pulmonary fibrosis      H/O sinus bradycardia      History of fracture of right hip  "unoperable"      S/P hysterectomy      S/P foot surgery      S/P knee surgery      After cataract  bilateral eye cataract surgically removed      Closed right hip fracture  rigth hip ORIF july 19 2016      S/P IVC filter  nov 2016      S/P carpal tunnel release  Right 2008 & 6/27/17      H/O kyphoplasty      Port-A-Cath in place  right chest          FAMILY HISTORY:  Family history of bladder cancer (Mother)    Family history of myocardial infarction (Father)    FH: atrial fibrillation (Father)        SOCIAL HISTORY:  Smoking: [ ] Never Smoked [ ] Former Smoker (__ packs x ___ years) [ ] Current Smoker  (__ packs x ___ years)  Substance Use: [ ] Never Used [ ] Used ____  EtOH Use:  Marital Status: [ ] Single [ ]  [ ]  [ ]   Sexual History:   Occupation:  Recent Travel:  Country of Birth:  Advance Directives:    Allergies    No Known Allergies    Intolerances        HOME MEDICATIONS:    REVIEW OF SYSTEMS:            OBJECTIVE:  ICU Vital Signs Last 24 Hrs  T(C): 38.3 (08 Jun 2025 11:44), Max: 38.3 (08 Jun 2025 11:30)  T(F): 101 (08 Jun 2025 11:44), Max: 101 (08 Jun 2025 11:30)  HR: 65 (08 Jun 2025 11:44) (65 - 71)  BP: 81/45 (08 Jun 2025 11:44) (81/45 - 82/48)  BP(mean): --  ABP: --  ABP(mean): --  RR: 17 (08 Jun 2025 11:44) (16 - 17)  SpO2: 99% (08 Jun 2025 11:44) (99% - 99%)    O2 Parameters below as of 08 Jun 2025 11:44  Patient On (Oxygen Delivery Method): nasal cannula  O2 Flow (L/min): 3            CAPILLARY BLOOD GLUCOSE          PHYSICAL EXAM:        HOSPITAL MEDICATIONS:  MEDICATIONS  (STANDING):  midodrine. 10 milliGRAM(s) Oral once  sodium chloride 0.9% Bolus 500 milliLiter(s) IV Bolus once  vancomycin  IVPB. 1000 milliGRAM(s) IV Intermittent once    MEDICATIONS  (PRN):      LABS:                        7.5    32.33 )-----------( 153      ( 08 Jun 2025 11:20 )             23.2     Hgb Trend: 7.5<--, 8.8<--, 8.5<--, 8.5<--, 7.7<--  06-08    137  |  99  |  72[H]  ----------------------------<  97  4.0   |  29  |  1.90[H]    Ca    8.6      08 Jun 2025 11:20  Mg     1.4     06-08    TPro  6.3  /  Alb  3.4  /  TBili  1.1  /  DBili  x   /  AST  11[L]  /  ALT  35  /  AlkPhos  39[L]  06-08    Creatinine Trend: 1.90<--, 1.40<--, 1.60<--, 1.30<--, 1.50<--, 1.60<--  PT/INR - ( 08 Jun 2025 11:20 )   PT: 26.3 sec;   INR: 2.27 ratio         PTT - ( 08 Jun 2025 11:20 )  PTT:34.5 sec  Urinalysis Basic - ( 08 Jun 2025 11:20 )    Color: x / Appearance: x / SG: x / pH: x  Gluc: 97 mg/dL / Ketone: x  / Bili: x / Urobili: x   Blood: x / Protein: x / Nitrite: x   Leuk Esterase: x / RBC: x / WBC x   Sq Epi: x / Non Sq Epi: x / Bacteria: x            MICROBIOLOGY:     RADIOLOGY:  [ ] Reviewed and interpreted by me    EKG:        Karla ANP-BC (ext. [id number])           CHIEF COMPLAINT:    HPI:      84 yr old female with PMHx afib on eliquis, COPD (not on home O2), CKD3, aplastic anemia, polymyalgia rheumatica on prednisone 5 mg po qd, AVN bilat hips, HTN, HLD, HFpEF (75% 6.2.25), diastolic dysfx grade1, recent hospitalization 5/25/25-6/5/25 for AHDF/COPD exacerbation. Pt with eventual improvement and transfer to [facility] facility from where she presents to E.D. this a.m. 6/8/25 with c/o Rt sided chest pain. general malaise. Pt stated began to feel ill evening before presentation, daughter this a.m. endorsed pt lethargic, pale.     In E.D. Pt found to have fever with tmax of 101, KIMBERLY on CKD with sCr 1.90 (baseline 1.2-1.6), HYPOtnsive with SBP 80's (pt on midodrine therapy, usual 's). In addition found to have leukocytosis of 37.6 (baseline 5.25 6/5/25), procalcitonin of 13.6, Hgb 7.5, elevated INR 2.27,  Chest xray demonstrated RML consolidations, concerning for pneum. In E.D. pt receiving 500 cc's NS, Vanco 1 gm, zosyn. Pt received tylenol 1 gm IV x1.       Consult called for Hypotension in setting of possible RML pneum      PAST MEDICAL & SURGICAL HISTORY:  COPD (chronic obstructive pulmonary disease)      DM (diabetes mellitus)  diet-controlled      PAF (paroxysmal atrial fibrillation)  s/p ablation      Hiatal hernia      H/O aplastic anemia      History of IBS      H/O osteoporosis      HLD (hyperlipidemia)      PMR (polymyalgia rheumatica)      History of basal cell cancer      Chronic kidney disease (CKD)      Hypotension      Presence of IVC filter      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      History of immunodeficiency      Lumbar compression fracture      H/O hiatal hernia      H/O pulmonary fibrosis      H/O sinus bradycardia      History of fracture of right hip  "unoperable"      S/P hysterectomy      S/P foot surgery      S/P knee surgery      After cataract  bilateral eye cataract surgically removed      Closed right hip fracture  rigth hip ORIF july 19 2016      S/P IVC filter  nov 2016      S/P carpal tunnel release  Right 2008 & 6/27/17      H/O kyphoplasty      Port-A-Cath in place  right chest          FAMILY HISTORY:  Family history of bladder cancer (Mother)    Family history of myocardial infarction (Father)    FH: atrial fibrillation (Father)        SOCIAL HISTORY:  Smoking: [ ] Never Smoked [ ] Former Smoker (__ packs x ___ years) [ ] Current Smoker  (__ packs x ___ years)  Substance Use: [ ] Never Used [ ] Used ____  EtOH Use:  Marital Status: [ ] Single [ ]  [ ]  [ ]   Sexual History:   Occupation:  Recent Travel:  Country of Birth:  Advance Directives:    Allergies    No Known Allergies    Intolerances        HOME MEDICATIONS:    REVIEW OF SYSTEMS:            OBJECTIVE:  ICU Vital Signs Last 24 Hrs  T(C): 38.3 (08 Jun 2025 11:44), Max: 38.3 (08 Jun 2025 11:30)  T(F): 101 (08 Jun 2025 11:44), Max: 101 (08 Jun 2025 11:30)  HR: 65 (08 Jun 2025 11:44) (65 - 71)  BP: 81/45 (08 Jun 2025 11:44) (81/45 - 82/48)  BP(mean): --  ABP: --  ABP(mean): --  RR: 17 (08 Jun 2025 11:44) (16 - 17)  SpO2: 99% (08 Jun 2025 11:44) (99% - 99%)    O2 Parameters below as of 08 Jun 2025 11:44  Patient On (Oxygen Delivery Method): nasal cannula  O2 Flow (L/min): 3            CAPILLARY BLOOD GLUCOSE          PHYSICAL EXAM:        HOSPITAL MEDICATIONS:  MEDICATIONS  (STANDING):  midodrine. 10 milliGRAM(s) Oral once  sodium chloride 0.9% Bolus 500 milliLiter(s) IV Bolus once  vancomycin  IVPB. 1000 milliGRAM(s) IV Intermittent once    MEDICATIONS  (PRN):      LABS:                        7.5    32.33 )-----------( 153      ( 08 Jun 2025 11:20 )             23.2     Hgb Trend: 7.5<--, 8.8<--, 8.5<--, 8.5<--, 7.7<--  06-08    137  |  99  |  72[H]  ----------------------------<  97  4.0   |  29  |  1.90[H]    Ca    8.6      08 Jun 2025 11:20  Mg     1.4     06-08    TPro  6.3  /  Alb  3.4  /  TBili  1.1  /  DBili  x   /  AST  11[L]  /  ALT  35  /  AlkPhos  39[L]  06-08    Creatinine Trend: 1.90<--, 1.40<--, 1.60<--, 1.30<--, 1.50<--, 1.60<--  PT/INR - ( 08 Jun 2025 11:20 )   PT: 26.3 sec;   INR: 2.27 ratio         PTT - ( 08 Jun 2025 11:20 )  PTT:34.5 sec  Urinalysis Basic - ( 08 Jun 2025 11:20 )    Color: x / Appearance: x / SG: x / pH: x  Gluc: 97 mg/dL / Ketone: x  / Bili: x / Urobili: x   Blood: x / Protein: x / Nitrite: x   Leuk Esterase: x / RBC: x / WBC x   Sq Epi: x / Non Sq Epi: x / Bacteria: x            MICROBIOLOGY:     RADIOLOGY:  [ ] Reviewed and interpreted by me    EKG:        Karla ANP-BC (ext. [id number])           CHIEF COMPLAINT:    HPI:      84 yr old female with PMHx afib on eliquis, COPD (not on home O2), CKD3, aplastic anemia, polymyalgia rheumatica on prednisone 5 mg po qd, AVN bilat hips, HTN, HLD, HFpEF (75% 6.2.25), diastolic dysfx grade1, recent hospitalization 5/25/25-6/5/25 for AHDF/COPD exacerbation, pt with hx of IVC filter, had Rt subclavian mediport. Pt with eventual improvement and transfer to [facility] facility from where she presents to E.D. this a.m. 6/8/25 with c/o Rt sided chest pain. general malaise. Pt stated began to feel ill evening before presentation, daughter this a.m. endorsed pt lethargic, pale.     In E.D. Pt found to have fever with tmax of 101, KIMBERLY on CKD with sCr 1.90 (baseline 1.2-1.6), HYPOtnsive with SBP 80's (pt on midodrine therapy, usual 's). In addition found to have leukocytosis of 37.6 (baseline 5.25 6/5/25), procalcitonin of 13.6, Hgb 7.5, elevated INR 2.27,  Chest xray demonstrated RML consolidations, concerning for pneum. In E.D. pt receiving 500 cc's NS, Vanco 1 gm, zosyn. Pt received tylenol 1 gm IV x1.       Consult called for Hypotension in setting of possible RML pneum      PAST MEDICAL & SURGICAL HISTORY:  COPD (chronic obstructive pulmonary disease)      DM (diabetes mellitus)  diet-controlled      PAF (paroxysmal atrial fibrillation)  s/p ablation      Hiatal hernia      H/O aplastic anemia      History of IBS      H/O osteoporosis      HLD (hyperlipidemia)      PMR (polymyalgia rheumatica)      History of basal cell cancer      Chronic kidney disease (CKD)      Hypotension      Presence of IVC filter      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      History of immunodeficiency      Lumbar compression fracture      H/O hiatal hernia      H/O pulmonary fibrosis      H/O sinus bradycardia      History of fracture of right hip  "unoperable"      S/P hysterectomy      S/P foot surgery      S/P knee surgery      After cataract  bilateral eye cataract surgically removed      Closed right hip fracture  rigth hip ORIF july 19 2016      S/P IVC filter  nov 2016      S/P carpal tunnel release  Right 2008 & 6/27/17      H/O kyphoplasty      Port-A-Cath in place  right chest          FAMILY HISTORY:  Family history of bladder cancer (Mother)    Family history of myocardial infarction (Father)    FH: atrial fibrillation (Father)        SOCIAL HISTORY:  Smoking: [ ] Never Smoked [x ] Former Smoker (__ packs x ___ years) [ ] Current Smoker  (__ packs x ___ years)  Substance Use: [x] Never Used [ ] Used ____  EtOH Use: none  Marital Status: [ ] Single [ ]  [ ]  [x ]   Sexual History:   Occupation:  Recent Travel:  Country of Birth:  Advance Directives:    Allergies    No Known Allergies    Intolerances        HOME MEDICATIONS:    REVIEW OF SYSTEMS:    CONSTITUTIONAL:          + weakness, + fevers, no chills, + fatigue, no myalgia    EYES/ENT:           No visual changes, no eye pain, no redness, no discharge, no vertigo, no throat pain, no ear pain, no congestion     NECK:            No pain, no stiffness    RESPIRATORY:            No cough, no wheezing, no hemoptysis, No shortness of breath    CARDIOVASCULAR:            +Rt sided chest pain with deep inspiration, no palpitations    GASTROINTESTINAL:           No abdominal pain, no epigastric pain, No nausea, no vomiting, no hematemesis, No diarrhea, no constipation, No melena, no hematochezia    GENITOURINARY:            No dysuria, no frequency, no hematuria    NEUROLOGICAL:            No numbness, no weakness,  no headaches, no dizziness    EXTREMITIES:          No swelling, no joint pain    SKIN:            No pruritis, no rashes            OBJECTIVE:  ICU Vital Signs Last 24 Hrs  T(C): 38.3 (08 Jun 2025 11:44), Max: 38.3 (08 Jun 2025 11:30)  T(F): 101 (08 Jun 2025 11:44), Max: 101 (08 Jun 2025 11:30)  HR: 65 (08 Jun 2025 11:44) (65 - 71)  BP: 81/45 (08 Jun 2025 11:44) (81/45 - 82/48)  BP(mean): --  ABP: --  ABP(mean): --  RR: 17 (08 Jun 2025 11:44) (16 - 17)  SpO2: 99% (08 Jun 2025 11:44) (99% - 99%)    O2 Parameters below as of 08 Jun 2025 11:44  Patient On (Oxygen Delivery Method): nasal cannula  O2 Flow (L/min): 3    CAPILLARY BLOOD GLUCOSE    VITAL SIGNS AT TIME OF CONSULT  BP: 74/46   ; HR: 76 NSR without ectopy; RR: 22  ; SPO2: 100% (3 liters N/C)  ; Temp:      PHYSICAL EXAM:  GENERAL:      NAD, well-groomed, well-developed    HEAD:       Atraumatic, Normocephalic    EYES:       EOMI, PERRLA 3 mm, conjunctiva and sclera clear    ENMT:      No oropharyngeal exudates, erythema or lesions,  Dry mucous membranes    NECK:       Supple, no cervical lymphadenopathy, no JVD    NERVOUS SYSTEM:    Alert & Oriented X3, CN II-XII intact, has spontaneous purposeful movement of all extremities; Upper extremities  4/5; Lower extremities 4/5, full sensation to light touch     CHEST/LUNG:      Utilizing 3 liters N/C   .Breath sounds bilaterally, with fine crackles bases to 1/4 up bilaterally, without rhonchi, end expiratory wheezing in bilat upper airways, without rubs. POCUS [place] predominant anteriorly, consolidation appreciated in RML/RLL lung fields     HEART:       cardiac monitor NSR without ectopy; S1/S2 I/VI sys murmur, without rubs, or gallops, POCUS good LVFx, RV<LV, effacement appreciated in parasternal long and short VTI 25, IVC 1.7 with > 50% variability    ABDOMEN:       Soft, Nontender, Nondistended; Bowel sounds present, Bladder non distended, non palpable    EXTREMITIES:       Pulses palpable radial pulses 2+ bilat, DP/PT 1+/1+ bilat, without clubbing, cyanosis. Digits warm to touch with good cap refill < 3 secs, 1+ bilat pitting edema    SKIN:      warm, dry, intact, normal color, no rash or abnormal lesions, without palpable nodes      HOSPITAL MEDICATIONS:  MEDICATIONS  (STANDING):  midodrine. 10 milliGRAM(s) Oral once  sodium chloride 0.9% Bolus 500 milliLiter(s) IV Bolus once  vancomycin  IVPB. 1000 milliGRAM(s) IV Intermittent once    MEDICATIONS  (PRN):      LABS:                        7.5    32.33 )-----------( 153      ( 08 Jun 2025 11:20 )             23.2     Hgb Trend: 7.5<--, 8.8<--, 8.5<--, 8.5<--, 7.7<--  06-08    137  |  99  |  72[H]  ----------------------------<  97  4.0   |  29  |  1.90[H]    Ca    8.6      08 Jun 2025 11:20  Mg     1.4     06-08    TPro  6.3  /  Alb  3.4  /  TBili  1.1  /  DBili  x   /  AST  11[L]  /  ALT  35  /  AlkPhos  39[L]  06-08    Creatinine Trend: 1.90<--, 1.40<--, 1.60<--, 1.30<--, 1.50<--, 1.60<--  PT/INR - ( 08 Jun 2025 11:20 )   PT: 26.3 sec;   INR: 2.27 ratio         PTT - ( 08 Jun 2025 11:20 )  PTT:34.5 sec  Urinalysis Basic - ( 08 Jun 2025 11:20 )    Color: x / Appearance: x / SG: x / pH: x  Gluc: 97 mg/dL / Ketone: x  / Bili: x / Urobili: x   Blood: x / Protein: x / Nitrite: x   Leuk Esterase: x / RBC: x / WBC x   Sq Epi: x / Non Sq Epi: x / Bacteria: x            MICROBIOLOGY:     RADIOLOGY:  [ ] Reviewed and interpreted by me    EKG:        Karla [facility]-BC (ext. [id number])           CHIEF COMPLAINT:    HPI:      84 yr old female with PMHx afib on eliquis, COPD (not on home O2), CKD3, aplastic anemia, polymyalgia rheumatica on prednisone 5 mg po qd, AVN bilat hips, HTN, HLD, HFpEF (75% 6.2.25), diastolic dysfx grade1, recent hospitalization 5/25/25-6/5/25 for AHDF/COPD exacerbation, pt with hx of IVC filter, had Rt subclavian mediport. Pt with eventual improvement and transfer to [facility] facility from where she presents to E.D. this a.m. 6/8/25 with c/o Rt sided chest pain. general malaise. Pt stated began to feel ill evening before presentation, daughter this a.m. endorsed pt lethargic, pale.     In E.D. Pt found to have fever with tmax of 101, KIMBERLY on CKD with sCr 1.90 (baseline 1.2-1.6), HYPOtnsive with SBP 80's (pt on midodrine therapy, usual 's). In addition found to have leukocytosis of 37.6 (baseline 5.25 6/5/25), procalcitonin of 13.6, Hgb 7.5, elevated INR 2.27,  Chest xray demonstrated RML consolidations, concerning for pneum. In E.D. pt receiving 500 cc's NS, Vanco 1 gm, zosyn. Pt received tylenol 1 gm IV x1.       Consult called for Hypotension in setting of possible RML pneum      PAST MEDICAL & SURGICAL HISTORY:  COPD (chronic obstructive pulmonary disease)    DM (diabetes mellitus)  diet-controlled    PAF (paroxysmal atrial fibrillation)  s/p ablation    Hiatal hernia    H/O aplastic anemia    History of IBS    H/O osteoporosis    HLD (hyperlipidemia)    PMR (polymyalgia rheumatica)    History of basal cell cancer    Chronic kidney disease (CKD)    Hypotension    Presence of IVC filter    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    History of immunodeficiency    Lumbar compression fracture    H/O hiatal hernia    H/O pulmonary fibrosis    H/O sinus bradycardia    History of fracture of right hip  "unoperable"    S/P hysterectomy    S/P foot surgery    S/P knee surgery    After cataract  bilateral eye cataract surgically removed      Closed right hip fracture  rigth hip ORIF july 19 2016      S/P IVC filter  nov 2016      S/P carpal tunnel release  Right 2008 & 6/27/17      H/O kyphoplasty      Port-A-Cath in place  right chest          FAMILY HISTORY:  Family history of bladder cancer (Mother)    Family history of myocardial infarction (Father)    FH: atrial fibrillation (Father)        SOCIAL HISTORY:  Smoking: [ ] Never Smoked [x ] Former Smoker (__ packs x ___ years) [ ] Current Smoker  (__ packs x ___ years)  Substance Use: [x] Never Used [ ] Used ____  EtOH Use: none  Marital Status: [ ] Single [ ]  [ ]  [x ]   Sexual History:   Occupation:  Recent Travel:  Country of Birth:  Advance Directives:    Allergies    No Known Allergies    Intolerances        HOME MEDICATIONS:    REVIEW OF SYSTEMS:    CONSTITUTIONAL:          + weakness, + fevers, no chills, + fatigue, no myalgia    EYES/ENT:           No visual changes, no eye pain, no redness, no discharge, no vertigo, no throat pain, no ear pain, no congestion     NECK:            No pain, no stiffness    RESPIRATORY:            No cough, no wheezing, no hemoptysis, No shortness of breath    CARDIOVASCULAR:            +Rt sided chest pain with deep inspiration, no palpitations    GASTROINTESTINAL:           No abdominal pain, no epigastric pain, No nausea, no vomiting, no hematemesis, No diarrhea, no constipation, No melena, no hematochezia    GENITOURINARY:            No dysuria, no frequency, no hematuria    NEUROLOGICAL:            No numbness, no weakness,  no headaches, no dizziness    EXTREMITIES:          No swelling, no joint pain    SKIN:            No pruritis, no rashes            OBJECTIVE:  ICU Vital Signs Last 24 Hrs  T(C): 38.3 (08 Jun 2025 11:44), Max: 38.3 (08 Jun 2025 11:30)  T(F): 101 (08 Jun 2025 11:44), Max: 101 (08 Jun 2025 11:30)  HR: 65 (08 Jun 2025 11:44) (65 - 71)  BP: 81/45 (08 Jun 2025 11:44) (81/45 - 82/48)  BP(mean): --  ABP: --  ABP(mean): --  RR: 17 (08 Jun 2025 11:44) (16 - 17)  SpO2: 99% (08 Jun 2025 11:44) (99% - 99%)    O2 Parameters below as of 08 Jun 2025 11:44  Patient On (Oxygen Delivery Method): nasal cannula  O2 Flow (L/min): 3    CAPILLARY BLOOD GLUCOSE    VITAL SIGNS AT TIME OF CONSULT  BP: 74/46   ; HR: 76 NSR without ectopy; RR: 22  ; SPO2: 100% (3 liters N/C)  ; Temp:      PHYSICAL EXAM:  GENERAL:      NAD, well-groomed, well-developed    HEAD:       Atraumatic, Normocephalic    EYES:       EOMI, PERRLA 3 mm, conjunctiva and sclera clear    ENMT:      No oropharyngeal exudates, erythema or lesions,  Dry mucous membranes    NECK:       Supple, no cervical lymphadenopathy, no JVD    NERVOUS SYSTEM:    Alert & Oriented X3, CN II-XII intact, has spontaneous purposeful movement of all extremities; Upper extremities  4/5; Lower extremities 4/5, full sensation to light touch     CHEST/LUNG:      Utilizing 3 liters N/C   .Breath sounds bilaterally, with fine crackles bases to 1/4 up bilaterally, without rhonchi, end expiratory wheezing in bilat upper airways, without rubs. POCUS [place] predominant anteriorly, consolidation appreciated in RML/RLL lung fields     HEART:       cardiac monitor NSR without ectopy; S1/S2 I/VI sys murmur, without rubs, or gallops, POCUS good LVFx, RV<LV, effacement appreciated in parasternal long and short VTI 25, IVC 1.7 with > 50% variability    ABDOMEN:       Soft, Nontender, Nondistended; Bowel sounds present, Bladder non distended, non palpable    EXTREMITIES:       Pulses palpable radial pulses 2+ bilat, DP/PT 1+/1+ bilat, without clubbing, cyanosis. Digits warm to touch with good cap refill < 3 secs, 1+ bilat pitting edema    SKIN:      warm, dry, intact, normal color, no rash or abnormal lesions, without palpable nodes      HOSPITAL MEDICATIONS:  MEDICATIONS  (STANDING):  midodrine. 10 milliGRAM(s) Oral once  sodium chloride 0.9% Bolus 500 milliLiter(s) IV Bolus once  vancomycin  IVPB. 1000 milliGRAM(s) IV Intermittent once    MEDICATIONS  (PRN):      LABS:                        7.5    32.33 )-----------( 153      ( 08 Jun 2025 11:20 )             23.2     Hgb Trend: 7.5<--, 8.8<--, 8.5<--, 8.5<--, 7.7<--  06-08    137  |  99  |  72[H]  ----------------------------<  97  4.0   |  29  |  1.90[H]    Ca    8.6      08 Jun 2025 11:20  Mg     1.4     06-08    TPro  6.3  /  Alb  3.4  /  TBili  1.1  /  DBili  x   /  AST  11[L]  /  ALT  35  /  AlkPhos  39[L]  06-08    Creatinine Trend: 1.90<--, 1.40<--, 1.60<--, 1.30<--, 1.50<--, 1.60<--  PT/INR - ( 08 Jun 2025 11:20 )   PT: 26.3 sec;   INR: 2.27 ratio         PTT - ( 08 Jun 2025 11:20 )  PTT:34.5 sec  Urinalysis Basic - ( 08 Jun 2025 11:20 )    Color: x / Appearance: x / SG: x / pH: x  Gluc: 97 mg/dL / Ketone: x  / Bili: x / Urobili: x   Blood: x / Protein: x / Nitrite: x   Leuk Esterase: x / RBC: x / WBC x   Sq Epi: x / Non Sq Epi: x / Bacteria: x            MICROBIOLOGY:     RADIOLOGY:  [ ] Reviewed and interpreted by me    EKG:        [facility]-BC (ext. [id number])           CHIEF COMPLAINT:    HPI:      84 yr old female with PMHx afib on eliquis, COPD (not on home O2), PE/Rt lower extremity DVT 2017, Rt LE IVC filter 2017 CKD3, aplastic anemia, polymyalgia rheumatica on prednisone 5 mg po qd, requiring PRBC transfusions ~8 weeks, AVN bilat hips, HTN, HLD, HFpEF (75% 6.2.25), diastolic dysfx grade1, recent hospitalization 5/25/25-6/5/25 for AHDF/COPD exacerbation, pt with hx of IVC filter, had Rt subclavian mediport. Pt with eventual improvement and transfer to [facility] facility from where she presents to E.D. this a.m. 6/8/25 with c/o Rt sided chest pain. general malaise. Pt stated began to feel ill evening before presentation, daughter this a.m. endorsed pt lethargic, pale.     In E.D. Pt found to have fever with tmax of 101, KIMBERLY on CKD with sCr 1.90 (baseline 1.2-1.6), HYPOtnsive with SBP 80's (pt on midodrine therapy, usual 's). In addition found to have leukocytosis of 37.6 (baseline 5.25 6/5/25), procalcitonin of 13.6, Hgb 7.5, elevated INR 2.27,  Chest xray demonstrated RML consolidations, concerning for pneum. In E.D. pt receiving 500 cc's NS, Vanco 1 gm, zosyn. Pt received tylenol 1 gm IV x1.       Consult called for Hypotension in setting of possible RML pneum      PAST MEDICAL & SURGICAL HISTORY:  COPD (chronic obstructive pulmonary disease)    DM (diabetes mellitus)  diet-controlled    PAF (paroxysmal atrial fibrillation)  s/p ablation    Hiatal hernia    H/O aplastic anemia    History of IBS    H/O osteoporosis    HLD (hyperlipidemia)    PMR (polymyalgia rheumatica)    History of basal cell cancer    Chronic kidney disease (CKD)    Hypotension    Presence of IVC filter    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    History of immunodeficiency    Lumbar compression fracture    H/O hiatal hernia    H/O pulmonary fibrosis    H/O sinus bradycardia    History of fracture of right hip  "unoperable"    S/P hysterectomy    S/P foot surgery    S/P knee surgery    After cataract  bilateral eye cataract surgically removed      Closed right hip fracture  rigth hip ORIF july 19 2016      S/P IVC filter  nov 2016      S/P carpal tunnel release  Right 2008 & 6/27/17      H/O kyphoplasty      Port-A-Cath in place  right chest          FAMILY HISTORY:  Family history of bladder cancer (Mother)    Family history of myocardial infarction (Father)    FH: atrial fibrillation (Father)        SOCIAL HISTORY:  Smoking: [ ] Never Smoked [x ] Former Smoker (__ packs x ___ years) [ ] Current Smoker  (__ packs x ___ years)  Substance Use: [x] Never Used [ ] Used ____  EtOH Use: none  Marital Status: [ ] Single [ ]  [ ]  [x ]   Sexual History:   Occupation:  Recent Travel:  Country of Birth:  Advance Directives:    Allergies    No Known Allergies    Intolerances        HOME MEDICATIONS:    REVIEW OF SYSTEMS:    CONSTITUTIONAL:          + weakness, + fevers, no chills, + fatigue, no myalgia    EYES/ENT:           No visual changes, no eye pain, no redness, no discharge, no vertigo, no throat pain, no ear pain, no congestion     NECK:            No pain, no stiffness    RESPIRATORY:            No cough, no wheezing, no hemoptysis, No shortness of breath    CARDIOVASCULAR:            +Rt sided chest pain with deep inspiration, no palpitations    GASTROINTESTINAL:           No abdominal pain, no epigastric pain, No nausea, no vomiting, no hematemesis, No diarrhea, no constipation, No melena, no hematochezia    GENITOURINARY:            No dysuria, no frequency, no hematuria    NEUROLOGICAL:            No numbness, no weakness,  no headaches, no dizziness    EXTREMITIES:          No swelling, no joint pain    SKIN:            No pruritis, no rashes            OBJECTIVE:  ICU Vital Signs Last 24 Hrs  T(C): 38.3 (08 Jun 2025 11:44), Max: 38.3 (08 Jun 2025 11:30)  T(F): 101 (08 Jun 2025 11:44), Max: 101 (08 Jun 2025 11:30)  HR: 65 (08 Jun 2025 11:44) (65 - 71)  BP: 81/45 (08 Jun 2025 11:44) (81/45 - 82/48)  BP(mean): --  ABP: --  ABP(mean): --  RR: 17 (08 Jun 2025 11:44) (16 - 17)  SpO2: 99% (08 Jun 2025 11:44) (99% - 99%)    O2 Parameters below as of 08 Jun 2025 11:44  Patient On (Oxygen Delivery Method): nasal cannula  O2 Flow (L/min): 3    CAPILLARY BLOOD GLUCOSE    VITAL SIGNS AT TIME OF CONSULT  BP: 74/46   ; HR: 76 NSR without ectopy; RR: 22  ; SPO2: 100% (3 liters N/C)  ; Temp:      PHYSICAL EXAM:  GENERAL:      NAD, well-groomed, well-developed    HEAD:       Atraumatic, Normocephalic    EYES:       EOMI, PERRLA 3 mm, conjunctiva and sclera clear    ENMT:      No oropharyngeal exudates, erythema or lesions,  Dry mucous membranes    NECK:       Supple, no cervical lymphadenopathy, no JVD    NERVOUS SYSTEM:    Alert & Oriented X3, CN II-XII intact, has spontaneous purposeful movement of all extremities; Upper extremities  4/5; Lower extremities 4/5, full sensation to light touch     CHEST/LUNG:      Utilizing 3 liters N/C   .Breath sounds bilaterally, with fine crackles bases to 1/4 up bilaterally, without rhonchi, end expiratory wheezing in bilat upper airways, without rubs. POCUS [place] predominant anteriorly, consolidation appreciated in RML/RLL lung fields     HEART:       cardiac monitor NSR without ectopy; S1/S2 I/VI sys murmur, without rubs, or gallops, POCUS good LVFx, RV<LV, effacement appreciated in parasternal long and short VTI 25, IVC 1.7 with > 50% variability    ABDOMEN:       Soft, Nontender, Nondistended; Bowel sounds present, Bladder non distended, non palpable    EXTREMITIES:       Pulses palpable radial pulses 2+ bilat, DP/PT 1+/1+ bilat, without clubbing, cyanosis. Digits warm to touch with good cap refill < 3 secs, 1+ bilat pitting edema    SKIN:      warm, dry, intact, normal color, no rash or abnormal lesions, without palpable nodes      HOSPITAL MEDICATIONS:  MEDICATIONS  (STANDING):  midodrine. 10 milliGRAM(s) Oral once  sodium chloride 0.9% Bolus 500 milliLiter(s) IV Bolus once  vancomycin  IVPB. 1000 milliGRAM(s) IV Intermittent once    MEDICATIONS  (PRN):      LABS:                        7.5    32.33 )-----------( 153      ( 08 Jun 2025 11:20 )             23.2     Hgb Trend: 7.5<--, 8.8<--, 8.5<--, 8.5<--, 7.7<--  06-08    137  |  99  |  72[H]  ----------------------------<  97  4.0   |  29  |  1.90[H]    Ca    8.6      08 Jun 2025 11:20  Mg     1.4     06-08    TPro  6.3  /  Alb  3.4  /  TBili  1.1  /  DBili  x   /  AST  11[L]  /  ALT  35  /  AlkPhos  39[L]  06-08    Creatinine Trend: 1.90<--, 1.40<--, 1.60<--, 1.30<--, 1.50<--, 1.60<--  PT/INR - ( 08 Jun 2025 11:20 )   PT: 26.3 sec;   INR: 2.27 ratio         PTT - ( 08 Jun 2025 11:20 )  PTT:34.5 sec  Urinalysis Basic - ( 08 Jun 2025 11:20 )    Color: x / Appearance: x / SG: x / pH: x  Gluc: 97 mg/dL / Ketone: x  / Bili: x / Urobili: x   Blood: x / Protein: x / Nitrite: x   Leuk Esterase: x / RBC: x / WBC x   Sq Epi: x / Non Sq Epi: x / Bacteria: x            MICROBIOLOGY:     RADIOLOGY:  [ ] Reviewed and interpreted by me    EKG:        Karla ANP-BC (ext. [id number])           CHIEF COMPLAINT:    HPI:      84 yr old female with PMHx afib on eliquis, COPD (not on home O2), PE/Rt lower extremity DVT 2017, Rt LE IVC filter 2017 CKD3, aplastic anemia, polymyalgia rheumatica on prednisone 5 mg po qd, requiring PRBC transfusions ~8 weeks (via Rt subclavian mediport), AVN bilat hips, HTN, HLD, HFpEF (75% 6.2.25), diastolic dysfx grade1, recent hospitalization 5/25/25-6/5/25 for AHDF/COPD exacerbation, pt with hx of IVC filter, had Rt subclavian mediport. Pt with eventual improvement and transfer to [facility] facility from where she presents to E.D. this a.m. 6/8/25 with c/o Rt sided chest pain. general malaise. Pt stated began to feel ill evening before presentation, daughter this a.m. endorsed pt lethargic, pale.     In E.D. Pt found to have fever with tmax of 101, KIMBERLY on CKD with sCr 1.90 (baseline 1.2-1.6), HYPOtnsive with SBP 80's (pt on midodrine therapy, usual 's). In addition found to have leukocytosis of 37.6 (baseline 5.25 6/5/25), procalcitonin of 13.6, Hgb 7.5, elevated INR 2.27,  Chest xray demonstrated RML consolidations, concerning for pneum. In E.D. pt receiving 500 cc's NS, Vanco 1 gm, zosyn. Pt received tylenol 1 gm IV x1.       Consult called for Hypotension in setting of possible RML pneum      PAST MEDICAL & SURGICAL HISTORY:  COPD (chronic obstructive pulmonary disease)    DM (diabetes mellitus)  diet-controlled    PAF (paroxysmal atrial fibrillation)  s/p ablation    Hiatal hernia    H/O aplastic anemia    History of IBS    H/O osteoporosis    HLD (hyperlipidemia)    PMR (polymyalgia rheumatica)    History of basal cell cancer    Chronic kidney disease (CKD)    Hypotension    Presence of IVC filter    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    History of immunodeficiency    Lumbar compression fracture    H/O hiatal hernia    H/O pulmonary fibrosis    H/O sinus bradycardia    History of fracture of right hip  "unoperable"    S/P hysterectomy    S/P foot surgery    S/P knee surgery    After cataract  bilateral eye cataract surgically removed      Closed right hip fracture  rigth hip ORIF july 19 2016      S/P IVC filter  nov 2016      S/P carpal tunnel release  Right 2008 & 6/27/17      H/O kyphoplasty      Port-A-Cath in place  right chest          FAMILY HISTORY:  Family history of bladder cancer (Mother)    Family history of myocardial infarction (Father)    FH: atrial fibrillation (Father)        SOCIAL HISTORY:  Smoking: [ ] Never Smoked [x ] Former Smoker (__ packs x ___ years) [ ] Current Smoker  (__ packs x ___ years)  Substance Use: [x] Never Used [ ] Used ____  EtOH Use: none  Marital Status: [ ] Single [ ]  [ ]  [x ]   Sexual History:   Occupation:  Recent Travel:  Country of Birth:  Advance Directives:    Allergies    No Known Allergies    Intolerances        HOME MEDICATIONS:    REVIEW OF SYSTEMS:    CONSTITUTIONAL:          + weakness, + fevers, no chills, + fatigue, no myalgia    EYES/ENT:           No visual changes, no eye pain, no redness, no discharge, no vertigo, no throat pain, no ear pain, no congestion     NECK:            No pain, no stiffness    RESPIRATORY:            No cough, no wheezing, no hemoptysis, No shortness of breath    CARDIOVASCULAR:            +Rt sided chest pain with deep inspiration, no palpitations    GASTROINTESTINAL:           No abdominal pain, no epigastric pain, No nausea, no vomiting, no hematemesis, No diarrhea, no constipation, No melena, no hematochezia    GENITOURINARY:            No dysuria, no frequency, no hematuria    NEUROLOGICAL:            No numbness, no weakness,  no headaches, no dizziness    EXTREMITIES:          No swelling, no joint pain    SKIN:            No pruritis, no rashes            OBJECTIVE:  ICU Vital Signs Last 24 Hrs  T(C): 38.3 (08 Jun 2025 11:44), Max: 38.3 (08 Jun 2025 11:30)  T(F): 101 (08 Jun 2025 11:44), Max: 101 (08 Jun 2025 11:30)  HR: 65 (08 Jun 2025 11:44) (65 - 71)  BP: 81/45 (08 Jun 2025 11:44) (81/45 - 82/48)  BP(mean): --  ABP: --  ABP(mean): --  RR: 17 (08 Jun 2025 11:44) (16 - 17)  SpO2: 99% (08 Jun 2025 11:44) (99% - 99%)    O2 Parameters below as of 08 Jun 2025 11:44  Patient On (Oxygen Delivery Method): nasal cannula  O2 Flow (L/min): 3    CAPILLARY BLOOD GLUCOSE    VITAL SIGNS AT TIME OF CONSULT  BP: 74/46   ; HR: 76 NSR without ectopy; RR: 22  ; SPO2: 100% (3 liters N/C)  ; Temp:      PHYSICAL EXAM:  GENERAL:      NAD, well-groomed, well-developed    HEAD:       Atraumatic, Normocephalic    EYES:       EOMI, PERRLA 3 mm, conjunctiva and sclera clear    ENMT:      No oropharyngeal exudates, erythema or lesions,  Dry mucous membranes    NECK:       Supple, no cervical lymphadenopathy, no JVD    NERVOUS SYSTEM:    Alert & Oriented X3, CN II-XII intact, has spontaneous purposeful movement of all extremities; Upper extremities  4/5; Lower extremities 4/5, full sensation to light touch     CHEST/LUNG:      Utilizing 3 liters N/C   .Breath sounds bilaterally, with fine crackles bases to 1/4 up bilaterally, without rhonchi, end expiratory wheezing in bilat upper airways, without rubs. POCUS [place] predominant anteriorly, consolidation appreciated in RML/RLL lung fields     HEART:       cardiac monitor NSR without ectopy; S1/S2 I/VI sys murmur, without rubs, or gallops, POCUS good LVFx, RV<LV, effacement appreciated in parasternal long and short VTI 25, IVC 1.7 with > 50% variability    ABDOMEN:       Soft, Nontender, Nondistended; Bowel sounds present, Bladder non distended, non palpable    EXTREMITIES:       Pulses palpable radial pulses 2+ bilat, DP/PT 1+/1+ bilat, without clubbing, cyanosis. Digits warm to touch with good cap refill < 3 secs, 1+ bilat pitting edema    SKIN:      warm, dry, intact, normal color, no rash or abnormal lesions, without palpable nodes      HOSPITAL MEDICATIONS:  MEDICATIONS  (STANDING):  midodrine. 10 milliGRAM(s) Oral once  sodium chloride 0.9% Bolus 500 milliLiter(s) IV Bolus once  vancomycin  IVPB. 1000 milliGRAM(s) IV Intermittent once    MEDICATIONS  (PRN):      LABS:                        7.5    32.33 )-----------( 153      ( 08 Jun 2025 11:20 )             23.2     Hgb Trend: 7.5<--, 8.8<--, 8.5<--, 8.5<--, 7.7<--  06-08    137  |  99  |  72[H]  ----------------------------<  97  4.0   |  29  |  1.90[H]    Ca    8.6      08 Jun 2025 11:20  Mg     1.4     06-08    TPro  6.3  /  Alb  3.4  /  TBili  1.1  /  DBili  x   /  AST  11[L]  /  ALT  35  /  AlkPhos  39[L]  06-08    Creatinine Trend: 1.90<--, 1.40<--, 1.60<--, 1.30<--, 1.50<--, 1.60<--  PT/INR - ( 08 Jun 2025 11:20 )   PT: 26.3 sec;   INR: 2.27 ratio         PTT - ( 08 Jun 2025 11:20 )  PTT:34.5 sec  Urinalysis Basic - ( 08 Jun 2025 11:20 )    Color: x / Appearance: x / SG: x / pH: x  Gluc: 97 mg/dL / Ketone: x  / Bili: x / Urobili: x   Blood: x / Protein: x / Nitrite: x   Leuk Esterase: x / RBC: x / WBC x   Sq Epi: x / Non Sq Epi: x / Bacteria: x            MICROBIOLOGY:     RADIOLOGY:  [ ] Reviewed and interpreted by me    EKG:        Karla ANP-BC (ext. [id number])           CHIEF COMPLAINT:    HPI:      84 yr old female with PMHx afib on eliquis, COPD (not on home O2), PE/Rt lower extremity DVT 2017, Rt LE IVC filter 2017 CKD3, aplastic anemia, polymyalgia rheumatica on prednisone 5 mg po qd, requiring PRBC transfusions ~8 weeks (via Rt subclavian mediport), AVN bilat hips, HTN, HLD, HFpEF (75% 6.2.25), diastolic dysfx grade1, recent hospitalization 5/25/25-6/5/25 for ADHF/COPD exacerbation, pt with hx of IVC filter, had Rt subclavian mediport. Pt with eventual improvement and transfer to [facility] facility from where she presents to E.D. this a.m. 6/8/25 with c/o Rt sided chest pain. general malaise. Pt stated began to feel ill evening before presentation, daughter this a.m. endorsed pt lethargic, pale.     In E.D. Pt found to have fever with tmax of 101, KIMBERLY on CKD with sCr 1.90 (baseline 1.2-1.6), HYPOtnsive with SBP 80's (pt on midodrine therapy, usual 's). In addition found to have leukocytosis of 37.6 (baseline 5.25 6/5/25), procalcitonin of 13.6, Hgb 7.5, elevated INR 2.27,  Chest xray demonstrated RML consolidations, concerning for pneum. In E.D. pt receiving 500 cc's NS, Vanco 1 gm, zosyn. Pt received tylenol 1 gm IV x1.       Consult called for Hypotension in setting of possible RML pneum      PAST MEDICAL & SURGICAL HISTORY:  COPD (chronic obstructive pulmonary disease)    DM (diabetes mellitus)  diet-controlled    PAF (paroxysmal atrial fibrillation)  s/p ablation    Hiatal hernia    H/O aplastic anemia    History of IBS    H/O osteoporosis    HLD (hyperlipidemia)    PMR (polymyalgia rheumatica)    History of basal cell cancer    Chronic kidney disease (CKD)    Hypotension    Presence of IVC filter    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    History of immunodeficiency    Lumbar compression fracture    H/O hiatal hernia    H/O pulmonary fibrosis    H/O sinus bradycardia    History of fracture of right hip  "unoperable"    S/P hysterectomy    S/P foot surgery    S/P knee surgery    After cataract  bilateral eye cataract surgically removed      Closed right hip fracture  rigth hip ORIF july 19 2016      S/P IVC filter  nov 2016      S/P carpal tunnel release  Right 2008 & 6/27/17      H/O kyphoplasty      Port-A-Cath in place  right chest          FAMILY HISTORY:  Family history of bladder cancer (Mother)    Family history of myocardial infarction (Father)    FH: atrial fibrillation (Father)        SOCIAL HISTORY:  Smoking: [ ] Never Smoked [x ] Former Smoker (__ packs x ___ years) [ ] Current Smoker  (__ packs x ___ years)  Substance Use: [x] Never Used [ ] Used ____  EtOH Use: none  Marital Status: [ ] Single [ ]  [ ]  [x ]   Sexual History:   Occupation:  Recent Travel:  Country of Birth:  Advance Directives:    Allergies    No Known Allergies    Intolerances        HOME MEDICATIONS:    REVIEW OF SYSTEMS:    CONSTITUTIONAL:          + weakness, + fevers, no chills, + fatigue, no myalgia    EYES/ENT:           No visual changes, no eye pain, no redness, no discharge, no vertigo, no throat pain, no ear pain, no congestion     NECK:            No pain, no stiffness    RESPIRATORY:            No cough, no wheezing, no hemoptysis, No shortness of breath    CARDIOVASCULAR:            +Rt sided chest pain with deep inspiration, no palpitations    GASTROINTESTINAL:           No abdominal pain, no epigastric pain, No nausea, no vomiting, no hematemesis, No diarrhea, no constipation, No melena, no hematochezia    GENITOURINARY:            No dysuria, no frequency, no hematuria    NEUROLOGICAL:            No numbness, no weakness,  no headaches, no dizziness    EXTREMITIES:          No swelling, no joint pain    SKIN:            No pruritis, no rashes            OBJECTIVE:  ICU Vital Signs Last 24 Hrs  T(C): 38.3 (08 Jun 2025 11:44), Max: 38.3 (08 Jun 2025 11:30)  T(F): 101 (08 Jun 2025 11:44), Max: 101 (08 Jun 2025 11:30)  HR: 65 (08 Jun 2025 11:44) (65 - 71)  BP: 81/45 (08 Jun 2025 11:44) (81/45 - 82/48)  BP(mean): --  ABP: --  ABP(mean): --  RR: 17 (08 Jun 2025 11:44) (16 - 17)  SpO2: 99% (08 Jun 2025 11:44) (99% - 99%)    O2 Parameters below as of 08 Jun 2025 11:44  Patient On (Oxygen Delivery Method): nasal cannula  O2 Flow (L/min): 3    CAPILLARY BLOOD GLUCOSE    VITAL SIGNS AT TIME OF CONSULT  BP: 74/46   ; HR: 76 NSR without ectopy; RR: 22  ; SPO2: 100% (3 liters N/C)  ; Temp:      PHYSICAL EXAM:  GENERAL:      NAD, well-groomed, well-developed    HEAD:       Atraumatic, Normocephalic    EYES:       EOMI, PERRLA 3 mm, conjunctiva and sclera clear    ENMT:      No oropharyngeal exudates, erythema or lesions,  Dry mucous membranes    NECK:       Supple, no cervical lymphadenopathy, no JVD    NERVOUS SYSTEM:    Alert & Oriented X3, CN II-XII intact, has spontaneous purposeful movement of all extremities; Upper extremities  4/5; Lower extremities 4/5, full sensation to light touch     CHEST/LUNG:      Utilizing 3 liters N/C   .Breath sounds bilaterally, with fine crackles bases to 1/4 up bilaterally, without rhonchi, end expiratory wheezing in bilat upper airways, without rubs. POCUS Marilu predominant anteriorly, consolidation appreciated in RML/RLL lung fields     HEART:       cardiac monitor NSR without ectopy; S1/S2 I/VI sys murmur, without rubs, or gallops, POCUS good LVFx, RV<LV, effacement appreciated in parasternal long and short VTI 25, IVC 1.7 with > 50% variability    ABDOMEN:       Soft, Nontender, Nondistended; Bowel sounds present, Bladder non distended, non palpable    EXTREMITIES:       Pulses palpable radial pulses 2+ bilat, DP/PT 1+/1+ bilat, without clubbing, cyanosis. Digits warm to touch with good cap refill < 3 secs, 1+ bilat pitting edema    SKIN:      warm, dry, intact, normal color, no rash or abnormal lesions, without palpable nodes      HOSPITAL MEDICATIONS:  MEDICATIONS  (STANDING):  midodrine. 10 milliGRAM(s) Oral once  sodium chloride 0.9% Bolus 500 milliLiter(s) IV Bolus once  vancomycin  IVPB. 1000 milliGRAM(s) IV Intermittent once    MEDICATIONS  (PRN):      LABS:                        7.5    32.33 )-----------( 153      ( 08 Jun 2025 11:20 )             23.2     Hgb Trend: 7.5<--, 8.8<--, 8.5<--, 8.5<--, 7.7<--  06-08    137  |  99  |  72[H]  ----------------------------<  97  4.0   |  29  |  1.90[H]    Ca    8.6      08 Jun 2025 11:20  Mg     1.4     06-08    TPro  6.3  /  Alb  3.4  /  TBili  1.1  /  DBili  x   /  AST  11[L]  /  ALT  35  /  AlkPhos  39[L]  06-08    Creatinine Trend: 1.90<--, 1.40<--, 1.60<--, 1.30<--, 1.50<--, 1.60<--  PT/INR - ( 08 Jun 2025 11:20 )   PT: 26.3 sec;   INR: 2.27 ratio         PTT - ( 08 Jun 2025 11:20 )  PTT:34.5 sec  Urinalysis Basic - ( 08 Jun 2025 11:20 )    Color: x / Appearance: x / SG: x / pH: x  Gluc: 97 mg/dL / Ketone: x  / Bili: x / Urobili: x   Blood: x / Protein: x / Nitrite: x   Leuk Esterase: x / RBC: x / WBC x   Sq Epi: x / Non Sq Epi: x / Bacteria: x            MICROBIOLOGY:     RADIOLOGY:  [ ] Reviewed and interpreted by me    EKG:        Karla ANP-BC (ext. [id number])           CHIEF COMPLAINT:    HPI:      84 yr old female with PMHx afib on eliquis, COPD (not on home O2), PE/Rt lower extremity DVT 2017, Rt LE IVC filter 2017 CKD3, aplastic anemia, polymyalgia rheumatica on prednisone 5 mg po qd, requiring PRBC transfusions ~8 weeks (via Rt subclavian mediport), AVN bilat hips, HTN, HLD, HFpEF (75% 6.2.25), diastolic dysfx grade1, recent hospitalization 5/25/25-6/5/25 for ADHF/COPD exacerbation, Pt with eventual improvement and transfer to [facility] facility from where she presents to E.D. this a.m. 6/8/25 with c/o Rt sided chest pain. general malaise. Pt stated began to feel ill evening before presentation, daughter this a.m. endorsed pt lethargic, pale.     In E.D. Pt found to have fever with tmax of 101, KIMBERLY on CKD with sCr 1.90 (baseline 1.2-1.6), HYPOtnsive with SBP 80's (pt on midodrine therapy, usual 's). In addition found to have leukocytosis of 37.6 (baseline 5.25 6/5/25), procalcitonin of 13.6, Hgb 7.5, elevated INR 2.27,  Chest xray demonstrated RML consolidations, concerning for pneum. In E.D. pt receiving 500 cc's NS, Vanco 1 gm, zosyn. Pt received tylenol 1 gm IV x1.       Consult called for Hypotension in setting of possible RML pneum      PAST MEDICAL & SURGICAL HISTORY:  COPD (chronic obstructive pulmonary disease)    DM (diabetes mellitus)  diet-controlled    PAF (paroxysmal atrial fibrillation)  s/p ablation    Hiatal hernia    H/O aplastic anemia    History of IBS    H/O osteoporosis    HLD (hyperlipidemia)    PMR (polymyalgia rheumatica)    History of basal cell cancer    Chronic kidney disease (CKD)    Hypotension    Presence of IVC filter    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    History of immunodeficiency    Lumbar compression fracture    H/O hiatal hernia    H/O pulmonary fibrosis    H/O sinus bradycardia    History of fracture of right hip  "unoperable"    S/P hysterectomy    S/P foot surgery    S/P knee surgery    After cataract  bilateral eye cataract surgically removed      Closed right hip fracture  rigth hip ORIF july 19 2016      S/P IVC filter  nov 2016      S/P carpal tunnel release  Right 2008 & 6/27/17      H/O kyphoplasty      Port-A-Cath in place  right chest          FAMILY HISTORY:  Family history of bladder cancer (Mother)    Family history of myocardial infarction (Father)    FH: atrial fibrillation (Father)        SOCIAL HISTORY:  Smoking: [ ] Never Smoked [x ] Former Smoker (__ packs x ___ years) [ ] Current Smoker  (__ packs x ___ years)  Substance Use: [x] Never Used [ ] Used ____  EtOH Use: none  Marital Status: [ ] Single [ ]  [ ]  [x ]   Sexual History:   Occupation:  Recent Travel:  Country of Birth:  Advance Directives:    Allergies    No Known Allergies    Intolerances        HOME MEDICATIONS:    REVIEW OF SYSTEMS:    CONSTITUTIONAL:          + weakness, + fevers, no chills, + fatigue, no myalgia    EYES/ENT:           No visual changes, no eye pain, no redness, no discharge, no vertigo, no throat pain, no ear pain, no congestion     NECK:            No pain, no stiffness    RESPIRATORY:            No cough, no wheezing, no hemoptysis, No shortness of breath    CARDIOVASCULAR:            +Rt sided chest pain with deep inspiration, no palpitations    GASTROINTESTINAL:           No abdominal pain, no epigastric pain, No nausea, no vomiting, no hematemesis, No diarrhea, no constipation, No melena, no hematochezia    GENITOURINARY:            No dysuria, no frequency, no hematuria    NEUROLOGICAL:            No numbness, no weakness,  no headaches, no dizziness    EXTREMITIES:          No swelling, no joint pain    SKIN:            No pruritis, no rashes            OBJECTIVE:  ICU Vital Signs Last 24 Hrs  T(C): 38.3 (08 Jun 2025 11:44), Max: 38.3 (08 Jun 2025 11:30)  T(F): 101 (08 Jun 2025 11:44), Max: 101 (08 Jun 2025 11:30)  HR: 65 (08 Jun 2025 11:44) (65 - 71)  BP: 81/45 (08 Jun 2025 11:44) (81/45 - 82/48)  BP(mean): --  ABP: --  ABP(mean): --  RR: 17 (08 Jun 2025 11:44) (16 - 17)  SpO2: 99% (08 Jun 2025 11:44) (99% - 99%)    O2 Parameters below as of 08 Jun 2025 11:44  Patient On (Oxygen Delivery Method): nasal cannula  O2 Flow (L/min): 3    CAPILLARY BLOOD GLUCOSE    VITAL SIGNS AT TIME OF CONSULT  BP: 74/46   ; HR: 76 NSR without ectopy; RR: 22  ; SPO2: 100% (3 liters N/C)  ; Temp:      PHYSICAL EXAM:  GENERAL:      NAD, well-groomed, well-developed    HEAD:       Atraumatic, Normocephalic    EYES:       EOMI, PERRLA 3 mm, conjunctiva and sclera clear    ENMT:      No oropharyngeal exudates, erythema or lesions,  Dry mucous membranes    NECK:       Supple, no cervical lymphadenopathy, no JVD    NERVOUS SYSTEM:    Alert & Oriented X3, CN II-XII intact, has spontaneous purposeful movement of all extremities; Upper extremities  4/5; Lower extremities 4/5, full sensation to light touch     CHEST/LUNG:      Utilizing 3 liters N/C   .Breath sounds bilaterally, with fine crackles bases to 1/4 up bilaterally, without rhonchi, end expiratory wheezing in bilat upper airways, without rubs. POCUS Marilu predominant anteriorly, consolidation appreciated in RML/RLL lung fields     HEART:       cardiac monitor NSR without ectopy; S1/S2 I/VI sys murmur, without rubs, or gallops, POCUS good LVFx, RV<LV, effacement appreciated in parasternal long and short VTI 25, IVC 1.7 with > 50% variability    ABDOMEN:       Soft, Nontender, Nondistended; Bowel sounds present, Bladder non distended, non palpable    EXTREMITIES:       Pulses palpable radial pulses 2+ bilat, DP/PT 1+/1+ bilat, without clubbing, cyanosis. Digits warm to touch with good cap refill < 3 secs, 1+ bilat pitting edema    SKIN:      warm, dry, intact, normal color, no rash or abnormal lesions, without palpable nodes      HOSPITAL MEDICATIONS:  MEDICATIONS  (STANDING):  midodrine. 10 milliGRAM(s) Oral once  sodium chloride 0.9% Bolus 500 milliLiter(s) IV Bolus once  vancomycin  IVPB. 1000 milliGRAM(s) IV Intermittent once    MEDICATIONS  (PRN):      LABS:                        7.5    32.33 )-----------( 153      ( 08 Jun 2025 11:20 )             23.2     Hgb Trend: 7.5<--, 8.8<--, 8.5<--, 8.5<--, 7.7<--  06-08    137  |  99  |  72[H]  ----------------------------<  97  4.0   |  29  |  1.90[H]    Ca    8.6      08 Jun 2025 11:20  Mg     1.4     06-08    TPro  6.3  /  Alb  3.4  /  TBili  1.1  /  DBili  x   /  AST  11[L]  /  ALT  35  /  AlkPhos  39[L]  06-08    Creatinine Trend: 1.90<--, 1.40<--, 1.60<--, 1.30<--, 1.50<--, 1.60<--  PT/INR - ( 08 Jun 2025 11:20 )   PT: 26.3 sec;   INR: 2.27 ratio         PTT - ( 08 Jun 2025 11:20 )  PTT:34.5 sec  Urinalysis Basic - ( 08 Jun 2025 11:20 )    Color: x / Appearance: x / SG: x / pH: x  Gluc: 97 mg/dL / Ketone: x  / Bili: x / Urobili: x   Blood: x / Protein: x / Nitrite: x   Leuk Esterase: x / RBC: x / WBC x   Sq Epi: x / Non Sq Epi: x / Bacteria: x            MICROBIOLOGY:     RADIOLOGY:  [ ] Reviewed and interpreted by me    EKG:        Karla ANP-BC (ext. [id number])

## 2025-06-08 NOTE — CONSULT NOTE ADULT - SUBJECTIVE AND OBJECTIVE BOX
84 female PMH of A-fib on Eliquis, COPD, CKD, aplastic anemia, polymyalgia rheumatica, on chronic steroids, avascular necrosis of hips, HLD, HTN, DM, recent admission to [place] 5/26 through 6/5/2025 for CHF/fluid overload/COPD exacerbation, presents to the ED form [facility] for evaluation of fever and generalized feeling of unwell. Pt states that last night she felt off and when she woke up she was drenched in sweat. Pt noted to have a fever and Daughter requested being sent to ED. Pt endorses continued feeling of being unwell with right sided chest pain. Denies left left sided chest pain, shortness of breath, light headedness, dizziness, or feverish.     CARDIAC Hx:  - Cardiologist: Dr. Rashid Stout   - ECHO 7/2/25:  LVEF 71%, Normal RV size, thickness, and function, LA moderately dilated, mild MR, Pulmonary artery systolic pressure is 54 mmHg, Mild MA,  Mild TR, Small bilateral pleural effusion, Increased LV mass and concentric hypertrophy  - EKG: Sinus rhythm 80 bpm with premature supraventricular complexes Left axis deviation Possible Lateral infarct, age undetermined    PAST MEDICAL & SURGICAL HISTORY:  COPD (chronic obstructive pulmonary disease)      DM (diabetes mellitus)  diet-controlled      PAF (paroxysmal atrial fibrillation)  s/p ablation      Hiatal hernia      H/O aplastic anemia      History of IBS      H/O osteoporosis      HLD (hyperlipidemia)      PMR (polymyalgia rheumatica)      History of basal cell cancer      Chronic kidney disease (CKD)      Hypotension      Presence of IVC filter      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      History of immunodeficiency      Lumbar compression fracture      H/O hiatal hernia      H/O pulmonary fibrosis      H/O sinus bradycardia      History of fracture of right hip  "unoperable"      S/P hysterectomy      S/P foot surgery      S/P knee surgery      After cataract  bilateral eye cataract surgically removed      Closed right hip fracture  rigth hip ORIF july 19 2016      S/P IVC filter  nov 2016      S/P carpal tunnel release  Right 2008 & 6/27/17      H/O kyphoplasty      Port-A-Cath in place  right chest          MEDICATIONS  (STANDING):  azithromycin  IVPB      azithromycin  IVPB 500 milliGRAM(s) IV Intermittent once    MEDICATIONS  (PRN):      FAMILY HISTORY:  Family history of bladder cancer (Mother)    Family history of myocardial infarction (Father)    FH: atrial fibrillation (Father)      Denies Family history of CAD or early MI    REVIEW OF SYSTEMS: As per HPI, otherwise negative       SOCIAL HISTORY:    No tobacco, Alcohol or Drug use    T(C): 38.3 (06-08-25 @ 11:44), Max: 38.3 (06-08-25 @ 11:30)  HR: 65 (06-08-25 @ 11:44) (65 - 71)  BP: 81/45 (06-08-25 @ 11:44) (81/45 - 82/48)  RR: 17 (06-08-25 @ 11:44) (16 - 17)  SpO2: 99% (06-08-25 @ 11:44) (99% - 99%)    GENERAL: patient appears ill, no acute distress, appropriately interactive  LUNGS: decreased breath sounds bilaterally, coarse right middle/upper breath sounds   HEART: soft S1/S2, regular rate with occasional additional bears, no murmurs noted, trace lower extremity edema  GASTROINTESTINAL: abdomen is soft, nontender, nondistended, normoactive bowel sounds  INTEGUMENT: good skin turgor, warm skin, appears well perfused  MUSCULOSKELETAL: no clubbing or cyanosis, no obvious deformity  NEUROLOGIC: awake, alert, oriented x3, good muscle tone in all 4 extremities      I&O's Detail      LABS:                        7.5    32.33 )-----------( 153      ( 08 Jun 2025 11:20 )             23.2     06-08    137  |  99  |  72[H]  ----------------------------<  97  4.0   |  29  |  1.90[H]    Ca    8.6      08 Jun 2025 11:20  Mg     1.4     06-08    TPro  6.3  /  Alb  3.4  /  TBili  1.1  /  DBili  x   /  AST  11[L]  /  ALT  35  /  AlkPhos  39[L]  06-08    CARDIAC MARKERS ( 08 Jun 2025 11:20 )  x     / x     / x     / x     / <1.0 ng/mL      PT/INR - ( 08 Jun 2025 11:20 )   PT: 26.3 sec;   INR: 2.27 ratio         PTT - ( 08 Jun 2025 11:20 )  PTT:34.5 sec  Urinalysis Basic - ( 08 Jun 2025 11:20 )    Color: x / Appearance: x / SG: x / pH: x  Gluc: 97 mg/dL / Ketone: x  / Bili: x / Urobili: x   Blood: x / Protein: x / Nitrite: x   Leuk Esterase: x / RBC: x / WBC x   Sq Epi: x / Non Sq Epi: x / Bacteria: x      I&O's Summary    BNP  RADIOLOGY & ADDITIONAL STUDIES:

## 2025-06-08 NOTE — CONSULT NOTE ADULT - ASSESSMENT
Assessment: 84 female PMH of A-fib on Eliquis, COPD, CKD, aplastic anemia, polymyalgia rheumatica, on chronic steroids, avascular necrosis of hips, HLD, HTN, DM, recent admission to [facility] 5/26 through 6/5/2025 for CHF/fluid overload/COPD exacerbation, presents to the ED form [facility] for evaluation of fever and generalized feeling of being unwell, found to be septic and hypotensive.     Plan:   - Patient without symptoms of angina or heart failure at this time.  - No clear evidence of acute ischemia, trops negative x1, CKMB <1.0   - EKG: Sinus rhythm 80 bpm with premature supraventricular complexes Left axis deviation Possible Lateral infarct, age undetermined    - No meaningful evidence of volume overload.  - ECHO 7/2/25:  LVEF 71%, Normal RV size, thickness, and function, LA moderately dilated, mild MR, Pulmonary artery systolic pressure is 54 mmHg, Mild ND,  Mild TR, Small bilateral pleural effusion, Increased LV mass and concentric hypertrophy  - Recent admission for CHF exacerbation   - Hypotensive to Low 80s with MAP of low 50s   - 500 cc bolus given, Midodrine 10 mg x1   - Hgb 7.5 with symptomatic anemia, 1 unit pRBCs ordered   - Monitor volume status, may require diuresis once sepsis is managed   - Pt amenable to pressor support if BP remains low   - sepsis management per primary team   - Admit to Tele vs ICU     - Monitor and replete lytes, keep K>4, Mg>2.  - Other cardiovascular workup will depend on clinical course.  - All other workup per primary team.  - Will continue to follow.

## 2025-06-08 NOTE — ED PROVIDER NOTE - OBJECTIVE STATEMENT
84 female PMH of A-fib on Eliquis, COPD, CKD, aplastic anemia, polymyalgia rheumatica, on chronic steroids, avascular necrosis of hips, HLD, HTN, DM, recent admission to [place] 5/26 through 6/5/2025 for CHF/fluid overload/COPD exacerbation, daughter at the bedside, states she has been in rehab facility, last night not feeling well, this morning clammy and sweaty, complaining of right-sided chest pain.

## 2025-06-08 NOTE — ED PROVIDER NOTE - PROGRESS NOTE DETAILS
called ICU, will consult, noted hypotension, elevated WBC, low Hg, to be transfused seen and evaluated by ICU, will admit patient recommend pressors

## 2025-06-08 NOTE — H&P ADULT - PROBLEM SELECTOR PLAN 7
-chronic recent hospitalization 5/25/25-6/5/25 for AHDF/COPD exacerbation  -Bronchodilators q 6 hrs prn for sob/wheezes  -c/w home med of Advair 500/50 q 12 hrs  -c/w home med tiotropium   -Supplemental O2 prn to maintain SPO2 > 92%  -plan per ICU

## 2025-06-08 NOTE — CONSULT NOTE ADULT - CRITICAL CARE ATTENDING COMMENT
84F h/o AFib on Eliquis, COPD, CKD, aplastic anemia, polymyalgia rheumatica on chronic steroids, avascular necrosis of hips, HTN, HLD, with recent admission to [place] 5/26 - 6/5 for acute HFpEF exacerbation and COPD exacerbation, discharged to rehab on 6/5 and returning today with right sided chest pain, general malaise and feeling unwell. She reports some slight cough though otherwise no other new symptoms reported. ON arrival to ED, BP 80s/40s, with rectal Tm 101F. CXR with dense right middle lobe infiltrate. Labs notable for new leukocytosis to 32, KIMBERLY on CKD, procal of 14 and pro-BNP of 6599. She had received 500cc bolus, than additional 250cc bolus without improvement in BP so ICU consulted for persistent hypotension in setting of septic shock.     Acute Problems  - Septic Shock  - RML PNA  - KIMBERLY on CKD    Plan  NEURO: mentating well despite hypotension  CV: persistent hypotension despite volume resuscitation, while does still appear to be able to tolerate some gentle resuscitation on POCUS, will continue judiciously while monitoring volume status closely ; in interim will need initiation of pressor support with levophed to maintain perfusion  - on home midodrine with reported baseline SBP in low 100s - can continue home dose for now and adjust based on levophed requirements  - on prednisone daily for at least 2 years per patient, will start stress dose steroids  - reported right sided chest pain which appears likely in setting of dense RML consolidation, trop negative on arrival and does not have other symptoms to suggest cardiac origin of symptoms  - TTE performed 6/2 showing normal LV function with EF 71% and LV hypertrophy, normal RV size and function, moderate LA dilation - do not believe should need repeat TTE this admission  PULM: stable on minimal supplemental O2 via nasal canula, maintain SpO2 > 90%  GI: diet as tolerated  RENAL: KIMBERLY likely hemodynamically driven in setting of shock state as well as possibly volume mediated from hypovolemia - continue to trend with ongoing resuscitation  ID: septic shock in setting of right middle lobe PNA - check urine legionella, sputum culture   - continue broad coverage, would add azithro pending urine legionella  - follow-up blood cultures sent in ED  - obtain MRSA swab  ENDO: trend FS while on steroids  HEME: on full AC with Eliquis, INR elevated and with KIMBERLY today, will hold for today and determine if Eliquis vs heparin for full AC tomorrow based on trend on coag panel and renal function; no additional chemical DVT ppx required   - Hb 7.5 from baseline in 8s with known aplastic anemia, ordered for 1unit pRBC by primary team - no signs of new/active bleeding    Plan discussed with patient and daughter at bedside. 84F h/o AFib on Eliquis, COPD, CKD, aplastic anemia, polymyalgia rheumatica on chronic steroids, avascular necrosis of hips, HTN, HLD, with recent admission to [place] 5/26 - 6/5 for acute HFpEF exacerbation and COPD exacerbation, discharged to rehab on 6/5 and returning today with right sided chest pain, general malaise and feeling unwell. She reports some slight cough though otherwise no other new symptoms reported. ON arrival to ED, BP 80s/40s, with rectal Tm 101F. CXR with dense right middle lobe infiltrate. Labs notable for new leukocytosis to 32, KIMBERLY on CKD, procal of 14 and pro-BNP of 6599. She had received 500cc bolus, than additional 250cc bolus without improvement in BP so ICU consulted for persistent hypotension in setting of septic shock.     Acute Problems  - Septic Shock  - RML PNA  - KIMBERLY on CKD    Plan  NEURO: mentating well despite hypotension  CV: persistent hypotension despite volume resuscitation, while does still appear to be able to tolerate some gentle resuscitation on POCUS, will continue judiciously while monitoring volume status closely ; in interim will need initiation of pressor support with levophed to maintain perfusion  - on home midodrine BID with reported baseline SBP in low 100s - can increase to q8hr and adjust based on levophed requirements  - on prednisone daily for at least 2 years per patient, will start stress dose steroids  - reported right sided chest pain which appears likely in setting of dense RML consolidation, trop negative on arrival and does not have other symptoms to suggest cardiac origin of symptoms  - TTE performed 6/2 showing normal LV function with EF 71% and LV hypertrophy, normal RV size and function, moderate LA dilation - do not believe should need repeat TTE this admission  PULM: stable on minimal supplemental O2 via nasal canula, maintain SpO2 > 90%  GI: diet as tolerated  RENAL: KIMBERLY likely hemodynamically driven in setting of shock state as well as possibly volume mediated from hypovolemia - continue to trend with ongoing resuscitation  ID: septic shock in setting of right middle lobe PNA - check urine legionella, sputum culture   - continue broad coverage, would add azithro pending urine legionella  - follow-up blood cultures sent in ED  - obtain MRSA swab  ENDO: trend FS while on steroids  HEME: on full AC with Eliquis, INR elevated and with KIMBERLY today, will hold for today and determine if Eliquis vs heparin for full AC tomorrow based on trend on coag panel and renal function; no additional chemical DVT ppx required   - Hb 7.5 from baseline in 8s with known aplastic anemia, ordered for 1unit pRBC by ED team - no signs of new/active bleeding    Plan discussed with patient and daughter at bedside.

## 2025-06-08 NOTE — ED ADULT NURSE REASSESSMENT NOTE - NS ED NURSE REASSESS COMMENT FT1
pt is AOX4, daughter at bedside. 1 PRBC unit infusing at this time, no adverse reaction noted at this time. Levophed infusing as per MD orders. pt has not c/o pain at this time. 3L n/c O2 placed. ICU transport pending. care ongoing. call bell placed within reach.

## 2025-06-08 NOTE — CONSULT NOTE ADULT - ASSESSMENT
Prerenal azotemia, CKD 3  Dyspnea: Pneumonia, h/o COPD/CHF  Shock   Diabetes  Aplastic anemia, requiring frequent blood transfusions    Pressor support as needed. Hold diuretics. Optimize BP. ICU evalauation. Monitor blood sugar levels. Insulin coverage as needed. Check cultures, IV abx.   Monitor h/h trend. Transfuse PRBC's PRN. Avoid nephrotoxic meds as possible. Avoid ACEI, ARB, NSAIDs and IV contrast. Will follow electrolytes and renal function trend.     Further recommendations pending clinical course. Thank you for the courtesy of this referral.

## 2025-06-08 NOTE — ED PROVIDER NOTE - CLINICAL SUMMARY MEDICAL DECISION MAKING FREE TEXT BOX
84 female presenting by ambulance from skilled nursing facility with report of right-sided chest pain, patient found to have 101 fever, placed on cardiac monitor, give oxygen as needed, pulse oximeter, send CBC, CMP, lactate, ESR, CRP, procalcitonin, blood cultures, UA, urine culture, patient has history of CHF, will hold fluids to prevent acute CHF, start antibiotics, send flu/COVID/RSV swab, admit.

## 2025-06-08 NOTE — ED ADULT NURSE NOTE - OBJECTIVE STATEMENT
pt is AOYEISON, came from [facility] facility. pt is AOX4, came from [facility]. pts daughter came to visit her at facility stating pt looked weak, lethargic and diaphoretic. pt started having c/o CP. pt denies SOB/HA/n/v/dizziness/ABD pain/loose stool. Pt is able to voice needs. pt does receive infusion, has a RCW port for transfusions. pt noted to be hypotensive. Pt did not take any of her daily medications today. pending lab and radiology results. care ongoing. call bell placed within reach.

## 2025-06-08 NOTE — H&P ADULT - PROBLEM SELECTOR PLAN 2
-likely due to shock state started on levophed   -s/p midodrine 10mg x1   -c/w home midodrine 10mg BID  -f/u cardiac enzymes   -TTE performed 6/2 showing normal LV function with EF 71% and LV hypertrophy, normal RV size and function, moderate LA dilation   -can consider repeat TTE   -cardio following   -plan per ICU

## 2025-06-08 NOTE — PATIENT PROFILE ADULT - FALL HARM RISK - HARM RISK INTERVENTIONS

## 2025-06-08 NOTE — H&P ADULT - NSHPSOCIALHISTORY_GEN_ALL_CORE
Tobacco: former smoker   EtOH:  denies   Ambulates with walker, wheelchair when out of house  Lives with  and son

## 2025-06-08 NOTE — H&P ADULT - PROBLEM SELECTOR PLAN 1
-in the ED  tmax of 101 w/ KIMBERLY on CKD with Cr 1.90 (baseline 1.2-1.6), Hypotensive with SBP 80's (pt on midodrine therapy, usual 's). also w/ leukocytosis of 32.33  -procalcitonin of 13.6, RVP neg   -in the ED given azithromycin x1, zosyn x1, vanco x1, IVF NS 500mL bolus x1, ofirmev x1, midodrine 10mg x1.  -CXR: Dense infiltrate off the right upper hilum is presently seen  -c/w azithromycin and zosyn   -started on levophed wean off pressors as tolerated (pt's baseline usual 's)  -c/w solumedrol 50mg q6   -f/u legionella, strep pneumo, bcx x2, Ucx   -obtain MRSA/MSSA PCR  -trend WBC and fever curve   -Supplemental O2 prn to maintain SPO2 > 92%  -plan per ICU

## 2025-06-08 NOTE — H&P ADULT - PROBLEM SELECTOR PLAN 10
Universal Safety Interventions
on home simvastatin 10mg qd as atorvastatin 10mg  -can consider restarting   -plan per ICU

## 2025-06-08 NOTE — H&P ADULT - NSHPPHYSICALEXAM_GEN_ALL_CORE
Physical Exam  General: awake, on NC no apparent distress, dry mucous membranes   Neck: Supple, no lymphadenopathy  Cardiac: regular rate, no murmur  Respiratory: decreased bs b/l. mild crackles b/l   Abdomen: Soft, nontender not distended,  Extremities:b/l LE edema   Skin: No rash.  Neurologic: alert, oriented, CN intact, motor and sensation grossly intact

## 2025-06-08 NOTE — H&P ADULT - PROBLEM SELECTOR PLAN 3
-chronic no signs of acute bleeding   -requiring PRBC transfusions ~8 weeks (via Rt subclavian mediport)  -Hgb 7.6 (baseline appears to be ~8)   -s/p 1U PRBC   -transfuse <7-7.5  -plan per ICU

## 2025-06-08 NOTE — ED PROVIDER NOTE - QRS
86 Isotretinoin Pregnancy And Lactation Text: This medication is Pregnancy Category X and is considered extremely dangerous during pregnancy. It is unknown if it is excreted in breast milk.

## 2025-06-08 NOTE — CONSULT NOTE ADULT - SUBJECTIVE AND OBJECTIVE BOX
[facility]phrology Services  Dr. Chisholm, Dr. Franklin, Dr. Baron, Dr. Clark, Dr. Stock                                        [facility], [facility], [address]                                                 [address] [place] [address]  Ph: [phone]  Fax: [phone]                                         Ph: [phone]  Fax: [phone]      Patient is a 84y old  Female who presents with a chief complaint of     HPI:  84 female PMH of A-fib on Eliquis, COPD, CKD, aplastic anemia, polymyalgia rheumatica, on chronic steroids, avascular necrosis of hips, HLD, HTN, DM, recent admission to [place] 5/26 through 6/5/2025 for CHF/fluid overload/COPD exacerbation, daughter at the bedside, states she has been in rehab facility, last night not feeling well, this morning clammy and sweaty, complaining of right-sided chest pain.    Renal consult called for KIMBERLY on CKD. History obtained from chart and patient's daughter at bedside.     PAST MEDICAL HISTORY:  COPD (chronic obstructive pulmonary disease)    DM (diabetes mellitus)    Pulmonary fibrosis    PAF (paroxysmal atrial fibrillation)    Hiatal hernia    GIB (gastrointestinal bleeding)    Pericarditis    Shoulder fracture, right    Hematoma    H/O aplastic anemia    History of IBS    H/O osteoporosis    HLD (hyperlipidemia)    PMR (polymyalgia rheumatica)    History of basal cell cancer    Chronic kidney disease (CKD)    Hypotension    Presence of IVC filter    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    History of immunodeficiency    Lumbar compression fracture    H/O hiatal hernia    H/O pulmonary fibrosis    H/O sinus bradycardia    History of fracture of right hip        PAST SURGICAL HISTORY:  S/P hysterectomy    S/P foot surgery    S/P knee surgery    After cataract    Closed right hip fracture    S/P IVC filter    S/P carpal tunnel release    H/O kyphoplasty    Port-A-Cath in place        FAMILY HISTORY:  Family history of bladder cancer (Mother)    Family history of myocardial infarction (Father)    FH: atrial fibrillation (Father)        SOCIAL HISTORY: No smoking or alcohol use     Allergies    No Known Allergies    Intolerances      Home Medications:  amiodarone 200 mg oral tablet: 0.5 tab(s) orally once a day (27 May 2025 13:21)  Bentyl 10 mg oral capsule: 1 cap(s) orally 2 times a day, As Needed (27 May 2025 13:22)  Eliquis 2.5 mg oral tablet: 1 tab(s) orally 2 times a day (27 May 2025 12:39)  esomeprazole 20 mg oral delayed release capsule: 1 cap(s) orally once a day (27 May 2025 13:23)  fluticasone-salmeterol 500 mcg-50 mcg/inh inhalation powder: 1 puff(s) inhaled 2 times a day (05 Jun 2025 11:18)  folic acid 1 mg oral tablet: 1 tab(s) orally once a day (in the morning) (27 May 2025 12:39)  gabapentin 400 mg oral capsule: 1 cap(s) orally 2 times a day (27 May 2025 12:39)  ipratropium-albuterol 0.5 mg-2.5 mg/3 mL inhalation solution: 3 milliliter(s) inhaled every 6 hours As needed Shortness of Breath and/or Wheezing (05 Jun 2025 11:18)  IVIG monthly:  (27 May 2025 12:39)  leflunomide 10 mg oral tablet: 1 tab(s) orally once a day (27 May 2025 13:23)  midodrine 10 mg oral tablet: 1 tab(s) orally every 8 hours (05 Jun 2025 11:18)  montelukast 10 mg oral tablet: 1 tab(s) orally once a day (27 May 2025 13:23)  pantoprazole 40 mg oral delayed release tablet: 1 tab(s) orally once a day (before a meal) (05 Jun 2025 11:18)  predniSONE 5 mg oral tablet: 1 tab(s) orally once a day (27 May 2025 13:22)  Procrit 10,000 units/mL preservative-free injectable solution: injectable once a week WEDNESDAY (27 May 2025 12:39)  Reclast Infusion , IV x 1 yearly:  (27 May 2025 13:22)  simvastatin 10 mg oral tablet: 1 tab(s) orally once a day (at bedtime) (27 May 2025 12:39)  tiotropium 2.5 mcg/inh inhalation aerosol: 2 puff(s) inhaled once a day (05 Jun 2025 11:18)  tiZANidine: 2 tab(s) orally once a day (at bedtime) as needed for  muscle spasm (27 May 2025 13:22)  torsemide 20 mg oral tablet: 1 tab(s) orally once a day (in the morning) (27 May 2025 12:39)    MEDICATIONS  (STANDING):  azithromycin  IVPB      chlorhexidine 2% Cloths 1 Application(s) Topical <User Schedule>  hydrocortisone sodium succinate Injectable 50 milliGRAM(s) IV Push every 6 hours  insulin lispro (ADMELOG) corrective regimen sliding scale   SubCutaneous every 6 hours  magnesium sulfate  IVPB 2 Gram(s) IV Intermittent once  norepinephrine Infusion 0.05 MICROgram(s)/kG/Min (6.25 mL/Hr) IV Continuous <Continuous>  piperacillin/tazobactam IVPB.. 3.375 Gram(s) IV Intermittent every 8 hours    MEDICATIONS  (PRN):      REVIEW OF SYSTEMS:  General: weak  Respiratory: + SOB  Cardiovascular: No CP or Palpitations	  Gastrointestinal: No nausea, Vomiting. No diarrhea  Genitourinary: No urinary complaints	  Musculoskeletal:  No new rash or lesions	 	  all other systems negative    T(F): 98.9 (06-08-25 @ 12:44), Max: 101 (06-08-25 @ 11:30)  HR: 62 (06-08-25 @ 14:25) (58 - 71)  BP: 84/34 (06-08-25 @ 14:25) (72/47 - 85/35)  RR: 18 (06-08-25 @ 14:25) (16 - 18)  SpO2: 99% (06-08-25 @ 14:25) (99% - 99%)  Wt(kg): --    PHYSICAL EXAM:  General: NAD  Respiratory: b/l air entry  Cardiovascular: S1 S2  Gastrointestinal: soft  Extremities: + edema        06-08    137  |  99  |  72[H]  ----------------------------<  97  4.0   |  29  |  1.90[H]    Ca    8.6      08 Jun 2025 11:20  Mg     1.4     06-08    TPro  6.3  /  Alb  3.4  /  TBili  1.1  /  DBili  x   /  AST  11[L]  /  ALT  35  /  AlkPhos  39[L]  06-08                          7.5    32.33 )-----------( 153      ( 08 Jun 2025 11:20 )             23.2       Hemoglobin: 7.5 g/dL (06-08 @ 11:20)  Hematocrit: 23.2 % (06-08 @ 11:20)  Potassium: 4.0 mmol/L (06-08 @ 11:20)  Blood Urea Nitrogen: 72 mg/dL (06-08 @ 11:20)      Creatinine, Serum: 1.90 (06-08 @ 11:20)      Urinalysis Basic - ( 08 Jun 2025 11:20 )    Color: x / Appearance: x / SG: x / pH: x  Gluc: 97 mg/dL / Ketone: x  / Bili: x / Urobili: x   Blood: x / Protein: x / Nitrite: x   Leuk Esterase: x / RBC: x / WBC x   Sq Epi: x / Non Sq Epi: x / Bacteria: x      LIVER FUNCTIONS - ( 08 Jun 2025 11:20 )  Alb: 3.4 g/dL / Pro: 6.3 g/dL / ALK PHOS: 39 U/L / ALT: 35 U/L / AST: 11 U/L / GGT: x           CARDIAC MARKERS ( 08 Jun 2025 11:20 )  x     / x     / x     / x     / <1.0 ng/mL            < from: Xray Chest 1 View- PORTABLE-Urgent (Xray Chest 1 View- PORTABLE-Urgent .) (06.08.25 @ 12:04) >    ACC: [id number] EXAM:  XR CHEST PORTABLE URGENT 1V   ORDERED BY: JACK LANE     PROCEDURE DATE:  06/08/2025          INTERPRETATION:  AP erect chest on June 8, 2025 at 11:44 AM. Patient has   right-sided chest pain.    Heart magnified by technique. Right-sided port again noted.    Heart appears to be a T12 vertebroplasty density with partial collapse   again noted.    On June 5 there were mild bibasilar findings mainly pleural on the left   and slight infiltrate in the right.    These are no longer evident.    The present film shows a dense infiltrate off the right upper hilum.    This is new.    IMPRESSION: Dense infiltrate off the right upper hilum is presently seen.    --- End of Report ---            YANNI LUNA MD; Attending Radiologist  This document has been electronically signed. Jun 8 2025 12:33PM    < end of copied text >

## 2025-06-08 NOTE — H&P ADULT - PROBLEM SELECTOR PLAN 4
-likely in the setting of sepsis   -on home full AC with Eliquis, given INR elevated and with KIMBERLY today, can hold for today and determine if Eliquis vs heparin for full AC tomorrow depending on coag panel and renal function  -plan per ICU

## 2025-06-08 NOTE — H&P ADULT - HISTORY OF PRESENT ILLNESS
84 yr old female with PMHx afib on eliquis, COPD (not on home O2), PE/Rt lower extremity DVT 2017, Rt LE IVC filter 2017 CKD3, aplastic anemia, polymyalgia rheumatica on prednisone 5 mg po qd, requiring PRBC transfusions ~8 weeks (via Rt subclavian mediport), AVN bilat hips, HTN, HLD, HFpEF (75% 6.2.25), diastolic dysfx grade1, recent hospitalization 5/25/25-6/5/25 for AHDF/COPD exacerbation, pt with hx of IVC filter, had Rt subclavian mediport. Pt with eventual improvement and transfer to [facility] facility from where she presents to [facility]. this a.m. 6/8/25 with c/o Rt sided chest pain. general malaise. Pt stated began to feel ill evening before presentation, daughter this a.m. endorsed pt lethargic, pale.     ED course  Vitals: 99.9 T , HR 71 , BP 82/48, RR 16 , SpO2 99% on 3L  Significant labs: WBC 32.33 Hgb 7.5 BUN 72 Cr 1.9 Mag 1.4 procal 13.61 pro-bnp 6599  RVP neg  Imaging: CXR: Dense infiltrate off the right upper hilum is presently seen  EKG:   In ED patient given: azithromycin x1, zosyn x1, vanco x1, IVF NS 500mL bolus x1, ofirmev x1, midodrine 10mg x1, 1U PRBC       84 yr old female with PMHx afib on eliquis, COPD (not on home O2), PE/Rt lower extremity DVT 2017, Rt LE IVC filter 2017 CKD3, aplastic anemia, polymyalgia rheumatica on prednisone 5 mg po qd, requiring PRBC transfusions ~8 weeks (via Rt subclavian mediport), AVN bilat hips, HTN, HLD, HFpEF (75% 6.2.25), diastolic dysfx grade1, recent hospitalization 5/25/25-6/5/25 for AHDF/COPD exacerbation, pt also with hx of IVC filter, had Rt subclavian mediport. Pt with eventual improvement and transfer to [facility] facility from where she presents to E.D. today with c/o Rt sided chest pain. general malaise. Pt stated began to feel ill evening before presentation, daughter this a.m. endorsed pt lethargic, pale.     ED course  Vitals: 99.9 T , HR 71 , BP 82/48, RR 16 , SpO2 99% on 3L  Significant labs: WBC 32.33 Hgb 7.5 BUN 72 Cr 1.9 Mag 1.4 procal 13.61 pro-bnp 6599  RVP neg  Imaging: CXR: Dense infiltrate off the right upper hilum is presently seen  EKG: Sinus rhythm 80 bpm with premature supraventricular complexes Left axis deviation Possible Lateral infarct, age undetermined  In ED patient given: azithromycin x1, zosyn x1, vanco x1, IVF NS 500mL bolus x1, ofirmev x1, midodrine 10mg x1, 1U PRBC

## 2025-06-09 LAB
ALBUMIN SERPL ELPH-MCNC: 2.8 G/DL — LOW (ref 3.3–5)
ALP SERPL-CCNC: 38 U/L — LOW (ref 40–120)
ALT FLD-CCNC: 32 U/L — SIGNIFICANT CHANGE UP (ref 12–78)
ANION GAP SERPL CALC-SCNC: 10 MMOL/L — SIGNIFICANT CHANGE UP (ref 5–17)
AST SERPL-CCNC: 9 U/L — LOW (ref 15–37)
BASOPHILS # BLD AUTO: 0.04 K/UL — SIGNIFICANT CHANGE UP (ref 0–0.2)
BASOPHILS NFR BLD AUTO: 0.2 % — SIGNIFICANT CHANGE UP (ref 0–2)
BILIRUB SERPL-MCNC: 0.8 MG/DL — SIGNIFICANT CHANGE UP (ref 0.2–1.2)
BUN SERPL-MCNC: 62 MG/DL — HIGH (ref 7–23)
CALCIUM SERPL-MCNC: 8.2 MG/DL — LOW (ref 8.5–10.1)
CHLORIDE SERPL-SCNC: 100 MMOL/L — SIGNIFICANT CHANGE UP (ref 96–108)
CK MB BLD-MCNC: <14.3 % — HIGH (ref 0–3.5)
CK MB CFR SERPL CALC: <1 NG/ML — SIGNIFICANT CHANGE UP (ref 0–3.6)
CK SERPL-CCNC: 7 U/L — LOW (ref 26–192)
CO2 SERPL-SCNC: 27 MMOL/L — SIGNIFICANT CHANGE UP (ref 22–31)
CREAT SERPL-MCNC: 1.4 MG/DL — HIGH (ref 0.5–1.3)
CULTURE RESULTS: SIGNIFICANT CHANGE UP
EGFR: 38 ML/MIN/1.73M2 — LOW
EGFR: 38 ML/MIN/1.73M2 — LOW
EOSINOPHIL # BLD AUTO: 0 K/UL — SIGNIFICANT CHANGE UP (ref 0–0.5)
EOSINOPHIL NFR BLD AUTO: 0 % — SIGNIFICANT CHANGE UP (ref 0–6)
GLUCOSE SERPL-MCNC: 144 MG/DL — HIGH (ref 70–99)
HCT VFR BLD CALC: 24.1 % — LOW (ref 34.5–45)
HGB BLD-MCNC: 7.8 G/DL — LOW (ref 11.5–15.5)
IMM GRANULOCYTES NFR BLD AUTO: 3 % — HIGH (ref 0–0.9)
INR BLD: 1.63 RATIO — HIGH (ref 0.85–1.16)
LYMPHOCYTES # BLD AUTO: 0.43 K/UL — LOW (ref 1–3.3)
LYMPHOCYTES # BLD AUTO: 1.7 % — LOW (ref 13–44)
MAGNESIUM SERPL-MCNC: 1.8 MG/DL — SIGNIFICANT CHANGE UP (ref 1.6–2.6)
MCHC RBC-ENTMCNC: 31 PG — SIGNIFICANT CHANGE UP (ref 27–34)
MCHC RBC-ENTMCNC: 32.4 G/DL — SIGNIFICANT CHANGE UP (ref 32–36)
MCV RBC AUTO: 95.6 FL — SIGNIFICANT CHANGE UP (ref 80–100)
MONOCYTES # BLD AUTO: 3.89 K/UL — HIGH (ref 0–0.9)
MONOCYTES NFR BLD AUTO: 15.7 % — HIGH (ref 2–14)
MRSA PCR RESULT.: SIGNIFICANT CHANGE UP
NEUTROPHILS # BLD AUTO: 19.67 K/UL — HIGH (ref 1.8–7.4)
NEUTROPHILS NFR BLD AUTO: 79.4 % — HIGH (ref 43–77)
NRBC BLD AUTO-RTO: 0 /100 WBCS — SIGNIFICANT CHANGE UP (ref 0–0)
PHOSPHATE SERPL-MCNC: 4 MG/DL — SIGNIFICANT CHANGE UP (ref 2.5–4.5)
PLATELET # BLD AUTO: 190 K/UL — SIGNIFICANT CHANGE UP (ref 150–400)
POTASSIUM SERPL-MCNC: 3.8 MMOL/L — SIGNIFICANT CHANGE UP (ref 3.5–5.3)
POTASSIUM SERPL-SCNC: 3.8 MMOL/L — SIGNIFICANT CHANGE UP (ref 3.5–5.3)
PROT SERPL-MCNC: 5.5 G/DL — LOW (ref 6–8.3)
PROTHROM AB SERPL-ACNC: 19.1 SEC — HIGH (ref 9.9–13.4)
RBC # BLD: 2.52 M/UL — LOW (ref 3.8–5.2)
RBC # FLD: 22.7 % — HIGH (ref 10.3–14.5)
S AUREUS DNA NOSE QL NAA+PROBE: SIGNIFICANT CHANGE UP
SODIUM SERPL-SCNC: 137 MMOL/L — SIGNIFICANT CHANGE UP (ref 135–145)
SPECIMEN SOURCE: SIGNIFICANT CHANGE UP
TROPONIN I, HIGH SENSITIVITY RESULT: 24.3 NG/L — SIGNIFICANT CHANGE UP
WBC # BLD: 24.78 K/UL — HIGH (ref 3.8–10.5)
WBC # FLD AUTO: 24.78 K/UL — HIGH (ref 3.8–10.5)

## 2025-06-09 PROCEDURE — 99233 SBSQ HOSP IP/OBS HIGH 50: CPT

## 2025-06-09 PROCEDURE — 99291 CRITICAL CARE FIRST HOUR: CPT

## 2025-06-09 RX ORDER — ACETAMINOPHEN 500 MG/5ML
650 LIQUID (ML) ORAL ONCE
Refills: 0 | Status: COMPLETED | OUTPATIENT
Start: 2025-06-09 | End: 2025-06-09

## 2025-06-09 RX ORDER — DEXTROSE 50 % IN WATER 50 %
25 SYRINGE (ML) INTRAVENOUS ONCE
Refills: 0 | Status: DISCONTINUED | OUTPATIENT
Start: 2025-06-09 | End: 2025-06-16

## 2025-06-09 RX ORDER — SODIUM CHLORIDE 9 G/1000ML
1000 INJECTION, SOLUTION INTRAVENOUS
Refills: 0 | Status: DISCONTINUED | OUTPATIENT
Start: 2025-06-09 | End: 2025-06-16

## 2025-06-09 RX ORDER — HEPARIN SODIUM 1000 [USP'U]/ML
5000 INJECTION INTRAVENOUS; SUBCUTANEOUS EVERY 8 HOURS
Refills: 0 | Status: DISCONTINUED | OUTPATIENT
Start: 2025-06-09 | End: 2025-06-10

## 2025-06-09 RX ORDER — GLUCAGON 3 MG/1
1 POWDER NASAL ONCE
Refills: 0 | Status: DISCONTINUED | OUTPATIENT
Start: 2025-06-09 | End: 2025-06-16

## 2025-06-09 RX ORDER — INSULIN LISPRO 100 U/ML
INJECTION, SOLUTION INTRAVENOUS; SUBCUTANEOUS
Refills: 0 | Status: DISCONTINUED | OUTPATIENT
Start: 2025-06-09 | End: 2025-06-16

## 2025-06-09 RX ORDER — NOREPINEPHRINE BITARTRATE 8 MG
0.04 SOLUTION INTRAVENOUS
Qty: 8 | Refills: 0 | Status: DISCONTINUED | OUTPATIENT
Start: 2025-06-09 | End: 2025-06-09

## 2025-06-09 RX ORDER — INSULIN LISPRO 100 U/ML
INJECTION, SOLUTION INTRAVENOUS; SUBCUTANEOUS AT BEDTIME
Refills: 0 | Status: DISCONTINUED | OUTPATIENT
Start: 2025-06-09 | End: 2025-06-16

## 2025-06-09 RX ORDER — DROXIDOPA 300 MG/1
100 CAPSULE ORAL EVERY 8 HOURS
Refills: 0 | Status: DISCONTINUED | OUTPATIENT
Start: 2025-06-09 | End: 2025-06-16

## 2025-06-09 RX ORDER — DEXTROSE 50 % IN WATER 50 %
12.5 SYRINGE (ML) INTRAVENOUS ONCE
Refills: 0 | Status: DISCONTINUED | OUTPATIENT
Start: 2025-06-09 | End: 2025-06-16

## 2025-06-09 RX ORDER — DEXTROSE 50 % IN WATER 50 %
15 SYRINGE (ML) INTRAVENOUS ONCE
Refills: 0 | Status: DISCONTINUED | OUTPATIENT
Start: 2025-06-09 | End: 2025-06-16

## 2025-06-09 RX ADMIN — Medication 40 MILLIGRAM(S): at 11:17

## 2025-06-09 RX ADMIN — Medication 25 GRAM(S): at 23:34

## 2025-06-09 RX ADMIN — AMIODARONE HYDROCHLORIDE 100 MILLIGRAM(S): 50 INJECTION, SOLUTION INTRAVENOUS at 06:11

## 2025-06-09 RX ADMIN — Medication 650 MILLIGRAM(S): at 07:00

## 2025-06-09 RX ADMIN — INSULIN LISPRO 1: 100 INJECTION, SOLUTION INTRAVENOUS; SUBCUTANEOUS at 21:46

## 2025-06-09 RX ADMIN — TIOTROPIUM BROMIDE INHALATION SPRAY 2 PUFF(S): 3.12 SPRAY, METERED RESPIRATORY (INHALATION) at 09:25

## 2025-06-09 RX ADMIN — INSULIN LISPRO 3: 100 INJECTION, SOLUTION INTRAVENOUS; SUBCUTANEOUS at 11:05

## 2025-06-09 RX ADMIN — MIDODRINE HYDROCHLORIDE 10 MILLIGRAM(S): 5 TABLET ORAL at 06:10

## 2025-06-09 RX ADMIN — GABAPENTIN 400 MILLIGRAM(S): 400 CAPSULE ORAL at 06:10

## 2025-06-09 RX ADMIN — Medication 50 MILLIGRAM(S): at 17:26

## 2025-06-09 RX ADMIN — MIDODRINE HYDROCHLORIDE 10 MILLIGRAM(S): 5 TABLET ORAL at 06:08

## 2025-06-09 RX ADMIN — DROXIDOPA 100 MILLIGRAM(S): 300 CAPSULE ORAL at 14:29

## 2025-06-09 RX ADMIN — MONTELUKAST SODIUM 10 MILLIGRAM(S): 10 TABLET ORAL at 11:17

## 2025-06-09 RX ADMIN — Medication 1 DOSE(S): at 20:40

## 2025-06-09 RX ADMIN — DROXIDOPA 100 MILLIGRAM(S): 300 CAPSULE ORAL at 09:25

## 2025-06-09 RX ADMIN — Medication 1 DOSE(S): at 06:22

## 2025-06-09 RX ADMIN — Medication 50 MILLIGRAM(S): at 11:17

## 2025-06-09 RX ADMIN — DROXIDOPA 100 MILLIGRAM(S): 300 CAPSULE ORAL at 20:39

## 2025-06-09 RX ADMIN — GABAPENTIN 400 MILLIGRAM(S): 400 CAPSULE ORAL at 17:43

## 2025-06-09 RX ADMIN — Medication 250 MILLIGRAM(S): at 14:32

## 2025-06-09 RX ADMIN — HEPARIN SODIUM 5000 UNIT(S): 1000 INJECTION INTRAVENOUS; SUBCUTANEOUS at 14:28

## 2025-06-09 RX ADMIN — HEPARIN SODIUM 5000 UNIT(S): 1000 INJECTION INTRAVENOUS; SUBCUTANEOUS at 20:40

## 2025-06-09 RX ADMIN — Medication 25 GRAM(S): at 09:25

## 2025-06-09 RX ADMIN — SODIUM CHLORIDE 50 MILLILITER(S): 9 INJECTION, SOLUTION INTRAVENOUS at 22:09

## 2025-06-09 RX ADMIN — Medication 50 MILLIGRAM(S): at 23:35

## 2025-06-09 RX ADMIN — Medication 1 APPLICATION(S): at 06:11

## 2025-06-09 RX ADMIN — MIDODRINE HYDROCHLORIDE 10 MILLIGRAM(S): 5 TABLET ORAL at 14:28

## 2025-06-09 RX ADMIN — Medication 25 GRAM(S): at 17:26

## 2025-06-09 RX ADMIN — Medication 650 MILLIGRAM(S): at 06:30

## 2025-06-09 RX ADMIN — MIDODRINE HYDROCHLORIDE 10 MILLIGRAM(S): 5 TABLET ORAL at 20:40

## 2025-06-09 RX ADMIN — Medication 50 MILLIGRAM(S): at 06:10

## 2025-06-09 NOTE — PROGRESS NOTE ADULT - ASSESSMENT
84F with PMHx AFib on Eliquis, COPD, CKD, aplastic anemia, polymyalgia rheumatica on chronic steroids, avascular necrosis of hips, HTN, HLD, with recent admission to [place] 5/26 - 6/5 for acute HFpEF exacerbation and COPD exacerbation, discharged to rehab on 6/5 and returning today with right sided chest pain, general malaise and feeling unwell. She reports some slight cough. Admitted to ICU for septic shock 2/2 PNA, KIMBERLY on CKD, anemia.     Acute Problems  - Septic Shock  - RML PNA  - KIMBERLY on CKD    NEURO: mentating well A&Ox3    CV:  - septic shock 2/2 PNA persistent hypotension  - on levophed to maintain perfusion (goal )   - on home midodrine BID with reported baseline SBP in low 100s - c/w q8hr and adjust based on levophed requirements  - on prednisone daily for at least 2 years per patient, c/w stress dose steroids  - reported right sided chest pain which appears likely in setting of dense RML consolidation  - TTE performed 6/2 showing normal LV function with EF 71% and LV hypertrophy, normal RV size and function, moderate LA dilation- hold home torsemide   -hx of afib c/w home amiodarone on home eliquis - will hold eliquis for now given blood tinged sputum can restart likely tomorrow   -cardio following     PULM: stable on minimal supplemental O2 via nasal canula, maintain SpO2 > 90%    GI: tolerating diet     RENAL  -KIMBERYL likely in setting of shock state as well as possibly volume mediated from hypovolemia now improving     ID:  - septic shock in setting of right middle lobe PNA   - c/w zosyn and azithro   - f/u urine legionella and strep pneumo   - f/u bcx x2  - sputum culture pos for few gram pos cocci in rods and chains   - MRSA swab neg     ENDO:   -polymyalgia rheumatica hx and T2DM   -on home steroids daily c/w stress dose steroids for now   -trend FS while on steroids  -ISS     HEME:  - on full AC with Eliquis, INR elevated and with KIMBERLY on admission, now improved. However one episode of blood tinged sputum. will hold eliquis for now and possible restart tomorrow   -known aplastic anemia likely baseline in ~8 s/p 1U on admission -Hgb stable 7.8  -no signs of active bleeding   -DVT ppx w/ heparin subq   84F with PMHx AFib on Eliquis, COPD, CKD, aplastic anemia, polymyalgia rheumatica on chronic steroids, avascular necrosis of hips, HTN, HLD, with recent admission to [place] 5/26 - 6/5 for acute HFpEF exacerbation and COPD exacerbation, discharged to rehab on 6/5 and returning today with right sided chest pain, general malaise and feeling unwell. She reports some slight cough. Admitted to ICU for septic shock 2/2 PNA, KIMBERLY on CKD, anemia.     Acute Problems  - Septic Shock  - RML PNA  - KIMBERLY on CKD    NEURO: mentating well A&Ox3    CV:  - septic shock 2/2 PNA persistent hypotension  - on levophed to maintain perfusion (goal )   - on home midodrine BID with reported baseline SBP in low 100s - c/w q8hr and now added droxidopa due to bradycardia  - on prednisone daily for at least 2 years per patient, c/w stress dose steroids  - reported right sided chest pain which appears likely in setting of dense RML consolidation  - TTE performed 6/2 showing normal LV function with EF 71% and LV hypertrophy, normal RV size and function, moderate LA dilation- hold home torsemide   -hx of afib c/w home amiodarone on home eliquis - will hold eliquis for now given blood tinged sputum can restart likely tomorrow   -cardio following     PULM: stable on minimal supplemental O2 via nasal canula, maintain SpO2 > 90%    GI: tolerating diet     RENAL  -KIMBERLY likely in setting of shock state as well as possibly volume mediated from hypovolemia now improving     ID:  - septic shock in setting of right middle lobe PNA   - c/w zosyn and azithro   - f/u urine legionella and strep pneumo   - f/u bcx x2  - sputum culture pos for few gram pos cocci in rods and chains   - MRSA swab neg     ENDO:   -polymyalgia rheumatica hx and T2DM   -on home steroids daily c/w stress dose steroids for now   -trend FS while on steroids  -ISS     HEME:  - on full AC with Eliquis, INR elevated and with KIMBERLY on admission, now improved. However one episode of blood tinged sputum. will hold eliquis for now and possible restart tomorrow   -known aplastic anemia likely baseline in ~8 s/p 1U on admission -Hgb stable 7.8  -no signs of active bleeding   -DVT ppx w/ heparin subq

## 2025-06-09 NOTE — PROGRESS NOTE ADULT - SUBJECTIVE AND OBJECTIVE BOX
Interval Events: No acute overnight events. Pt was seen and examined at bedside. She states she feels better but is still having pain on her rt chest. Pt resting in bed comfortably on NC. Daughter at bedside. Pt states she is still coughing and had blood tinged sputum this AM. Denies SOB, n/v, numbness tingling.     Review of Systems:  Constitutional: No fever, chills, fatigue  Neuro: No headache, numbness, weakness  Resp: + cough, wheezing, shortness of breath  CVS: + rt chest pain, no palpitations, leg swelling  GI: No abdominal pain, nausea, vomiting, diarrhea   : No dysuria, frequency, incontinence  Skin: No itching, burning, rashes, or lesions   Msk: No joint pain or swelling      ICU Vital Signs Last 24 Hrs  T(C): 37.2 (09 Jun 2025 13:30), Max: 37.5 (08 Jun 2025 15:45)  T(F): 99 (09 Jun 2025 13:30), Max: 99.5 (08 Jun 2025 15:45)  HR: 69 (09 Jun 2025 13:30) (49 - 84)  BP: 117/51 (09 Jun 2025 13:30) (77/31 - 154/48)  BP(mean): 72 (09 Jun 2025 13:30) (36 - 95)  ABP: --  ABP(mean): --  RR: 18 (09 Jun 2025 13:30) (14 - 25)  SpO2: 99% (09 Jun 2025 13:30) (96% - 100%)    O2 Parameters below as of 08 Jun 2025 15:33  Patient On (Oxygen Delivery Method): nasal cannula              06-08-25 @ 07:01  -  06-09-25 @ 07:00  --------------------------------------------------------  IN: 1262.4 mL / OUT: 900 mL / NET: 362.4 mL    06-09-25 @ 07:01  -  06-09-25 @ 13:43  --------------------------------------------------------  IN: 123.8 mL / OUT: 300 mL / NET: -176.2 mL        CAPILLARY BLOOD GLUCOSE      POCT Blood Glucose.: 280 mg/dL (09 Jun 2025 11:00)      I&O's Summary    08 Jun 2025 07:01  -  09 Jun 2025 07:00  --------------------------------------------------------  IN: 1262.4 mL / OUT: 900 mL / NET: 362.4 mL    09 Jun 2025 07:01  -  09 Jun 2025 13:43  --------------------------------------------------------  IN: 123.8 mL / OUT: 300 mL / NET: -176.2 mL        Physical Exam:   General: NAD, awake and alert, oriented x 3  HEENT: Mucous membranes are moist, anicteric  Lungs: decreased bs b/l. mild b/l expiratory wheezing R>L  Cardiovascular: Regular, S1 and S2, no murmurs, rubs, or gallops  Gastrointestinal: Bowel Sounds present, soft, nontender.   Lymph: No peripheral edema. No lymphadenopathy.  Skin: No rashes or ulcers  Psych:  Mood & affect appropriate    Meds:  azithromycin  IVPB   azithromycin  IVPB IV Intermittent  piperacillin/tazobactam IVPB.. IV Intermittent    aMIOdarone    Tablet Oral  droxidopa Oral  midodrine Oral  norepinephrine Infusion IV Continuous    hydrocortisone sodium succinate Injectable IV Push  insulin lispro (ADMELOG) corrective regimen sliding scale SubCutaneous    fluticasone propionate/ salmeterol 500-50 MICROgram(s) Diskus Inhalation  montelukast Oral  tiotropium 2.5 MICROgram(s) Inhaler Inhalation    gabapentin Oral      heparin   Injectable SubCutaneous    pantoprazole  Injectable IV Push      lactated ringers. IV Continuous      chlorhexidine 2% Cloths Topical                              7.8    24.78 )-----------( 190      ( 09 Jun 2025 05:48 )             24.1       06-09    137  |  100  |  62[H]  ----------------------------<  144[H]  3.8   |  27  |  1.40[H]    Ca    8.2[L]      09 Jun 2025 05:48  Phos  4.0     06-09  Mg     1.8     06-09    TPro  5.5[L]  /  Alb  2.8[L]  /  TBili  0.8  /  DBili  x   /  AST  9[L]  /  ALT  32  /  AlkPhos  38[L]  06-09      CARDIAC MARKERS ( 09 Jun 2025 05:48 )  x     / x     / x     / x     / <1.0 ng/mL  CARDIAC MARKERS ( 08 Jun 2025 19:35 )  x     / x     / x     / x     / <1.0 ng/mL  CARDIAC MARKERS ( 08 Jun 2025 11:20 )  x     / x     / x     / x     / <1.0 ng/mL      PT/INR - ( 09 Jun 2025 09:10 )   PT: 19.1 sec;   INR: 1.63 ratio         PTT - ( 08 Jun 2025 19:35 )  PTT:37.9 sec  Urinalysis Basic - ( 09 Jun 2025 05:48 )    Color: x / Appearance: x / SG: x / pH: x  Gluc: 144 mg/dL / Ketone: x  / Bili: x / Urobili: x   Blood: x / Protein: x / Nitrite: x   Leuk Esterase: x / RBC: x / WBC x   Sq Epi: x / Non Sq Epi: x / Bacteria: x      Sputum Sputum --   Few Squamous epithelial cells per low power field  Few polymorphonuclear leukocytes per low power field  Few Gram Positive Cocci in Pairs and Chains per oil power field 06-08 @ 18:25              Radiology: n/a    Bedside Ultrasound: n/a    Tubes/Lines: peripheral IV       GLOBAL ISSUE/BEST PRACTICE:  Analgesia: N  Sedation: N  HOB elevation: Y  Stress ulcer prophylaxis: Y  VTE prophylaxis: Y  Glycemic control: Y  Nutrition: Y    CODE STATUS: full code

## 2025-06-09 NOTE — CARE COORDINATION ASSESSMENT. - FACILITY OF RESIDENCE YN
Pt known to [facility] for short term care, the pt was at [facility] for 2 days prior to hospitalization./Yes

## 2025-06-09 NOTE — PROGRESS NOTE ADULT - ATTENDING COMMENTS
84F with PMHx AFib on Eliquis, COPD, CKD, aplastic anemia, polymyalgia rheumatica on chronic steroids, avascular necrosis of hips, HTN, HLD, with recent admission to [place] 5/26 - 6/5 for acute HFpEF exacerbation and COPD exacerbation, discharged to rehab on 6/5 and returning today with right sided chest pain, general malaise and feeling unwell. She reports some slight cough though otherwise no other new symptoms reported. ON arrival to ED, BP 80s/40s, with rectal Tm 101F. CXR with dense right middle lobe infiltrate. Labs notable for new leukocytosis to 32, KIMBERLY on CKD, procal of 14 and pro-BNP of 6599. She had received 500cc bolus, than additional 250cc bolus without improvement in BP so ICU consulted for persistent hypotension in setting of septic shock.     NEURO: mentating well, no acute issues  CV: persistent hypotension despite volume resuscitation, while does still appear to be able to tolerate some gentle resuscitation on POCUS, will continue judiciously while monitoring volume status closely ; in interim will need initiation of pressor support with levophed to maintain perfusion  - on home midodrine BID with reported baseline SBP in low 100s - increased to q8hr; added droxidopa due to bradycardia  - on prednisone daily for at least 2 years per patient, on stress dose steroids  - reported right sided chest pain which appears likely in setting of dense RML consolidation, trop negative on arrival and does not have other symptoms to suggest cardiac origin of symptoms  - TTE performed 6/2 showing normal LV function with EF 71% and LV hypertrophy, normal RV size and function, moderate LA dilation - do not believe should need repeat TTE this admission; hold torsemide   - on full dose AC for Afib however episode of blood tinged sputum today; hold full dose for now  PULM: stable on minimal supplemental O2 via nasal canula, maintain SpO2 > 90%  GI: diet as tolerated  RENAL: KIMBERLY likely hemodynamically driven in setting of shock state as well as possibly volume mediated from hypovolemia - continue to trend with ongoing resuscitation  ID: septic shock in setting of right middle lobe PNA - check urine legionella, sputum culture. MRSA swab negative   - continue broad coverage with zosyn and azithro pending urine legionella and strep  - follow-up blood cultures sent in ED  ENDO: trend FS while on steroids. on prednisone for PMR.   HEME: on full AC with Eliquis, INR elevated and with KIMBERLY on admission, now improved. However one episode of blood tinged sputum. Will   - Hb 7.5 from baseline in 8s with known aplastic anemia, ordered for 1unit pRBC by ED team - no signs of new/active bleeding 84F with PMHx AFib on Eliquis, COPD, CKD, aplastic anemia, polymyalgia rheumatica on chronic steroids, avascular necrosis of hips, HTN, HLD, with recent admission to [place] 5/26 - 6/5 for acute HFpEF exacerbation and COPD exacerbation, discharged to rehab on 6/5 and returning today with right sided chest pain, general malaise and feeling unwell. She reports some slight cough though otherwise no other new symptoms reported. ON arrival to ED, BP 80s/40s, with rectal Tm 101F. CXR with dense right middle lobe infiltrate. Labs notable for new leukocytosis to 32, KIMBERLY on CKD, procal of 14 and pro-BNP of 6599. She had received 500cc bolus, than additional 250cc bolus without improvement in BP so ICU consulted for persistent hypotension in setting of septic shock.     NEURO: mentating well, no acute issues  CV: persistent hypotension despite volume resuscitation, while does still appear to be able to tolerate some gentle resuscitation on POCUS, will continue judiciously while monitoring volume status closely ; in interim will need initiation of pressor support with levophed to maintain perfusion  - on home midodrine BID with reported baseline SBP in low 100s - increased to q8hr; added droxidopa due to bradycardia  - on prednisone daily for at least 2 years per patient, on stress dose steroids  - reported right sided chest pain which appears likely in setting of dense RML consolidation, trop negative on arrival and does not have other symptoms to suggest cardiac origin of symptoms  - TTE performed 6/2 showing normal LV function with EF 71% and LV hypertrophy, normal RV size and function, moderate LA dilation - do not believe should need repeat TTE this admission; hold torsemide   - on full dose AC for Afib however episode of blood tinged sputum today; hold full dose for now  PULM: stable on minimal supplemental O2 via nasal canula, maintain SpO2 > 90%  GI: diet as tolerated  RENAL: KIMBERLY likely hemodynamically driven in setting of shock state as well as possibly volume mediated from hypovolemia - continue to trend with ongoing resuscitation  ID: septic shock in setting of right middle lobe PNA - check urine legionella, sputum culture. MRSA swab negative   - continue broad coverage with zosyn and azithro pending urine legionella and strep  - follow-up blood cultures sent in ED  ENDO: trend FS while on steroids. on prednisone for PMR.   HEME: on full AC with Eliquis, INR elevated and with KIMBERLY on admission, now improved. However one episode of blood tinged sputum. Will   - Hb 7.5 from baseline in 8s with known aplastic anemia, s/p 1 unit PRBC in ER. Hgb 7.8- no active signs of bleeding  - will place on dvt ppx with heparin subq for today  DISPO/ETHICS: full code. PT eval pending

## 2025-06-09 NOTE — PROGRESS NOTE ADULT - ASSESSMENT
84 female PMH of A-fib on Eliquis, COPD, CKD, aplastic anemia, polymyalgia rheumatica, on chronic steroids, avascular necrosis of hips, HLD, HTN, DM, recent admission to [place] 5/26 through 6/5/2025 for CHF/fluid overload/COPD exacerbation, presents to the ED form [facility] for evaluation of fever and generalized feeling of being unwell, found to be septic and hypotensive.     Patient is well-known to our practice.  She was recently admitted to [place] earlier this month for heart failure exacerbation.  Now presenting from rehab with a generalized feeling of unwell and fever.    She has been complaining of right-sided pleuritic chest pain.  Her x-ray shows a focal consolidation in the right lung. Remains on abx    EKG does not have any signs of acute ischemia.  No significant changes from baseline.  Troponin is negative x 1.    She has a baseline dysautonomia with resultant severe orthostasis.  She is on chronic midodrine therapy.  She was significantly hypotensive in the ER with systolics now down to the 70s despite IV fluid boluses.  She remains on midodrine 10mg TID  On levo     She has a history of aplastic anemia  Hemoglobin is down to 7.5.  Will need blood transfusion.  ECHO 7/2/25:  LVEF 71%, Normal RV size, thickness, and function, LA moderately dilated, mild MR, Pulmonary artery systolic pressure is 54 mmHg, Mild WI,  Mild TR, Small bilateral pleural effusion, Increased LV mass and concentric hypertrophy  Would hold her diuretic therapy at this time given that she appears dry on exam.  S/p IVF.    She has a history of paroxysmal atrial fibrillation and DVT PE  Can hold her Eliquis for now until hemoglobin stable. Resume as soon as safe to do so   She is maintaining sinus rhythm.  Continue home dose of amiodarone.  Monitor LFTs.   84 female PMH of A-fib on Eliquis, COPD, CKD, aplastic anemia, polymyalgia rheumatica, on chronic steroids, avascular necrosis of hips, HLD, HTN, DM, recent admission to [place] 5/26 through 6/5/2025 for CHF/fluid overload/COPD exacerbation, presents to the ED form [facility] for evaluation of fever and generalized feeling of being unwell, found to be septic and hypotensive.     Patient is well-known to our practice.  She was recently admitted to [place] earlier this month for heart failure exacerbation.  Now presenting from rehab with a generalized feeling of unwell and fever.    She has been complaining of right-sided pleuritic chest pain, now resolved.   Her x-ray shows a focal consolidation in the right lung. Remains on abx    EKG does not have any signs of acute ischemia.  No significant changes from baseline.  Troponin is negative x 1.    She has a baseline dysautonomia with resultant severe orthostasis.  She is on chronic midodrine therapy.  She was significantly hypotensive in the ER with systolics now down to the 70s despite IV fluid boluses.  She remains on midodrine 10mg TID  On levo at 0.04, wean as tolerated     She has a history of aplastic anemia  Hemoglobin is down to 7.5. Now stable this AM  ECHO 7/2/25:  LVEF 71%, Normal RV size, thickness, and function, LA moderately dilated, mild MR, Pulmonary artery systolic pressure is 54 mmHg, Mild SC,  Mild TR, Small bilateral pleural effusion, Increased LV mass and concentric hypertrophy  Would hold her diuretic therapy at this time given that she appears dry on exam.  S/p IVF.    She has a history of paroxysmal atrial fibrillation and DVT PE  Can hold her Eliquis for now until hemoglobin stable. Resume as soon as safe to do so   She is maintaining sinus rhythm.  Continue home dose of amiodarone.  Monitor LFTs.

## 2025-06-09 NOTE — PROGRESS NOTE ADULT - SUBJECTIVE AND OBJECTIVE BOX
[facility] - Sai Valle, Girish Jackson, David Gallegos  Office Number:  [phone]    Patient resting comfortably in bed in NAD.  Laying flat with no respiratory distress.    ROS: negative unless otherwise mentioned.    Telemetry:      MEDICATIONS  (STANDING):  aMIOdarone    Tablet 100 milliGRAM(s) Oral daily  azithromycin  IVPB      azithromycin  IVPB 500 milliGRAM(s) IV Intermittent every 24 hours  chlorhexidine 2% Cloths 1 Application(s) Topical <User Schedule>  fluticasone propionate/ salmeterol 500-50 MICROgram(s) Diskus 1 Dose(s) Inhalation two times a day  gabapentin 400 milliGRAM(s) Oral every 12 hours  hydrocortisone sodium succinate Injectable 50 milliGRAM(s) IV Push every 6 hours  insulin lispro (ADMELOG) corrective regimen sliding scale   SubCutaneous every 6 hours  lactated ringers. 1000 milliLiter(s) (50 mL/Hr) IV Continuous <Continuous>  midodrine 10 milliGRAM(s) Oral every 8 hours  montelukast 10 milliGRAM(s) Oral daily  norepinephrine Infusion 0.05 MICROgram(s)/kG/Min (6.25 mL/Hr) IV Continuous <Continuous>  pantoprazole  Injectable 40 milliGRAM(s) IV Push daily  piperacillin/tazobactam IVPB.. 3.375 Gram(s) IV Intermittent every 8 hours  tiotropium 2.5 MICROgram(s) Inhaler 2 Puff(s) Inhalation daily    MEDICATIONS  (PRN):      Allergies    No Known Allergies    Intolerances        Vital Signs Last 24 Hrs  T(C): 37 (09 Jun 2025 06:30), Max: 38.3 (08 Jun 2025 11:30)  T(F): 98.6 (09 Jun 2025 06:30), Max: 101 (08 Jun 2025 11:30)  HR: 56 (09 Jun 2025 06:30) (49 - 71)  BP: 114/41 (09 Jun 2025 06:30) (72/47 - 154/48)  BP(mean): 63 (09 Jun 2025 06:30) (36 - 95)  RR: 18 (09 Jun 2025 06:30) (14 - 22)  SpO2: 100% (09 Jun 2025 06:30) (97% - 100%)    Parameters below as of 08 Jun 2025 15:33  Patient On (Oxygen Delivery Method): nasal cannula        I&O's Summary    08 Jun 2025 07:01  -  09 Jun 2025 07:00  --------------------------------------------------------  IN: 612.4 mL / OUT: 900 mL / NET: -287.6 mL        ON EXAM:    General: NAD, awake and alert, oriented x 3  HEENT: Mucous membranes are moist, anicteric  Lungs: Non-labored, breath sounds are clear bilaterally, No wheezing, rales or rhonchi  Cardiovascular: Regular, S1 and S2, no murmurs, rubs, or gallops  Gastrointestinal: Bowel Sounds present, soft, nontender.   Lymph: No peripheral edema. No lymphadenopathy.  Skin: No rashes or ulcers  Psych:  Mood & affect appropriate    LABS: All Labs Reviewed:                        7.8    24.78 )-----------( 190      ( 09 Jun 2025 05:48 )             24.1                         8.7    42.66 )-----------( 200      ( 08 Jun 2025 19:35 )             27.3                         7.5    32.33 )-----------( 153      ( 08 Jun 2025 11:20 )             23.2     09 Jun 2025 05:48    137    |  100    |  62     ----------------------------<  144    3.8     |  27     |  1.40   08 Jun 2025 19:35    134    |  96     |  70     ----------------------------<  280    4.1     |  27     |  2.00   08 Jun 2025 11:20    137    |  99     |  72     ----------------------------<  97     4.0     |  29     |  1.90     Ca    8.2        09 Jun 2025 05:48  Ca    8.0        08 Jun 2025 19:35  Ca    8.6        08 Jun 2025 11:20  Phos  4.0       09 Jun 2025 05:48  Phos  4.4       08 Jun 2025 19:35  Mg     1.8       09 Jun 2025 05:48  Mg     2.2       08 Jun 2025 19:35  Mg     1.4       08 Jun 2025 11:20    TPro  5.5    /  Alb  2.8    /  TBili  0.8    /  DBili  x      /  AST  9      /  ALT  32     /  AlkPhos  38     09 Jun 2025 05:48  TPro  5.9    /  Alb  3.1    /  TBili  2.0    /  DBili  x      /  AST  18     /  ALT  39     /  AlkPhos  46     08 Jun 2025 19:35  TPro  6.3    /  Alb  3.4    /  TBili  1.1    /  DBili  x      /  AST  11     /  ALT  35     /  AlkPhos  39     08 Jun 2025 11:20    PT/INR - ( 08 Jun 2025 19:35 )   PT: 26.2 sec;   INR: 2.26 ratio         PTT - ( 08 Jun 2025 19:35 )  PTT:37.9 sec  CARDIAC MARKERS ( 09 Jun 2025 05:48 )  x     / x     / x     / x     / <1.0 ng/mL  CARDIAC MARKERS ( 08 Jun 2025 19:35 )  x     / x     / x     / x     / <1.0 ng/mL  CARDIAC MARKERS ( 08 Jun 2025 11:20 )  x     / x     / x     / x     / <1.0 ng/mL      Blood Culture: Organism --  Gram Stain Blood -- Gram Stain   Few Squamous epithelial cells per low power field  Few polymorphonuclear leukocytes per low power field  Few Gram Positive Cocci in Pairs and Chains per oil power field  Specimen Source Sputum Sputum  Culture-Blood --           [facility] - Sai Valle, Girish Jackson, David Gallegos  Office Number:  [phone]    Patient resting comfortably in bed in NAD.  Laying flat with no respiratory distress.    On 2L NC saturating 100%  Remains on levo 0.04    ROS: negative unless otherwise mentioned.    Telemetry: SB    MEDICATIONS  (STANDING):  aMIOdarone    Tablet 100 milliGRAM(s) Oral daily  azithromycin  IVPB      azithromycin  IVPB 500 milliGRAM(s) IV Intermittent every 24 hours  chlorhexidine 2% Cloths 1 Application(s) Topical <User Schedule>  fluticasone propionate/ salmeterol 500-50 MICROgram(s) Diskus 1 Dose(s) Inhalation two times a day  gabapentin 400 milliGRAM(s) Oral every 12 hours  hydrocortisone sodium succinate Injectable 50 milliGRAM(s) IV Push every 6 hours  insulin lispro (ADMELOG) corrective regimen sliding scale   SubCutaneous every 6 hours  lactated ringers. 1000 milliLiter(s) (50 mL/Hr) IV Continuous <Continuous>  midodrine 10 milliGRAM(s) Oral every 8 hours  montelukast 10 milliGRAM(s) Oral daily  norepinephrine Infusion 0.05 MICROgram(s)/kG/Min (6.25 mL/Hr) IV Continuous <Continuous>  pantoprazole  Injectable 40 milliGRAM(s) IV Push daily  piperacillin/tazobactam IVPB.. 3.375 Gram(s) IV Intermittent every 8 hours  tiotropium 2.5 MICROgram(s) Inhaler 2 Puff(s) Inhalation daily    MEDICATIONS  (PRN):      Allergies    No Known Allergies    Intolerances        Vital Signs Last 24 Hrs  T(C): 37 (09 Jun 2025 06:30), Max: 38.3 (08 Jun 2025 11:30)  T(F): 98.6 (09 Jun 2025 06:30), Max: 101 (08 Jun 2025 11:30)  HR: 56 (09 Jun 2025 06:30) (49 - 71)  BP: 114/41 (09 Jun 2025 06:30) (72/47 - 154/48)  BP(mean): 63 (09 Jun 2025 06:30) (36 - 95)  RR: 18 (09 Jun 2025 06:30) (14 - 22)  SpO2: 100% (09 Jun 2025 06:30) (97% - 100%)    Parameters below as of 08 Jun 2025 15:33  Patient On (Oxygen Delivery Method): nasal cannula        I&O's Summary    08 Jun 2025 07:01  -  09 Jun 2025 07:00  --------------------------------------------------------  IN: 612.4 mL / OUT: 900 mL / NET: -287.6 mL        ON EXAM:    General: NAD, awake and alert, oriented x 3  HEENT: Mucous membranes are moist, anicteric  Lungs: Non-labored, breath sounds are clear bilaterally, No wheezing, rales or rhonchi  Cardiovascular: Regular, S1 and S2, no murmurs, rubs, or gallops  Gastrointestinal: Bowel Sounds present, soft, nontender.   Lymph: No peripheral edema. No lymphadenopathy.  Skin: No rashes or ulcers  Psych:  Mood & affect appropriate    LABS: All Labs Reviewed:                        7.8    24.78 )-----------( 190      ( 09 Jun 2025 05:48 )             24.1                         8.7    42.66 )-----------( 200      ( 08 Jun 2025 19:35 )             27.3                         7.5    32.33 )-----------( 153      ( 08 Jun 2025 11:20 )             23.2     09 Jun 2025 05:48    137    |  100    |  62     ----------------------------<  144    3.8     |  27     |  1.40   08 Jun 2025 19:35    134    |  96     |  70     ----------------------------<  280    4.1     |  27     |  2.00   08 Jun 2025 11:20    137    |  99     |  72     ----------------------------<  97     4.0     |  29     |  1.90     Ca    8.2        09 Jun 2025 05:48  Ca    8.0        08 Jun 2025 19:35  Ca    8.6        08 Jun 2025 11:20  Phos  4.0       09 Jun 2025 05:48  Phos  4.4       08 Jun 2025 19:35  Mg     1.8       09 Jun 2025 05:48  Mg     2.2       08 Jun 2025 19:35  Mg     1.4       08 Jun 2025 11:20    TPro  5.5    /  Alb  2.8    /  TBili  0.8    /  DBili  x      /  AST  9      /  ALT  32     /  AlkPhos  38     09 Jun 2025 05:48  TPro  5.9    /  Alb  3.1    /  TBili  2.0    /  DBili  x      /  AST  18     /  ALT  39     /  AlkPhos  46     08 Jun 2025 19:35  TPro  6.3    /  Alb  3.4    /  TBili  1.1    /  DBili  x      /  AST  11     /  ALT  35     /  AlkPhos  39     08 Jun 2025 11:20    PT/INR - ( 08 Jun 2025 19:35 )   PT: 26.2 sec;   INR: 2.26 ratio         PTT - ( 08 Jun 2025 19:35 )  PTT:37.9 sec  CARDIAC MARKERS ( 09 Jun 2025 05:48 )  x     / x     / x     / x     / <1.0 ng/mL  CARDIAC MARKERS ( 08 Jun 2025 19:35 )  x     / x     / x     / x     / <1.0 ng/mL  CARDIAC MARKERS ( 08 Jun 2025 11:20 )  x     / x     / x     / x     / <1.0 ng/mL      Blood Culture: Organism --  Gram Stain Blood -- Gram Stain   Few Squamous epithelial cells per low power field  Few polymorphonuclear leukocytes per low power field  Few Gram Positive Cocci in Pairs and Chains per oil power field  Specimen Source Sputum Sputum  Culture-Blood --

## 2025-06-09 NOTE — PROGRESS NOTE ADULT - PROBLEM SELECTOR PLAN 4
-likely in the setting of sepsis   -on home full AC with Eliquis  -heparin sq ordered for now, further plans pending h/h stabilization  -plan per ICU

## 2025-06-09 NOTE — CARE COORDINATION ASSESSMENT. - NSCAREPROVIDERS_GEN_ALL_CORE_FT
CARE PROVIDERS:  Accepting Physician: Naresh Vitale  Access Services: Vijay Covarrubias  Administration: Harry Salmon  Administration: Cesar Wright  Administration: Filipe Dunaway  Administration: Mauro Veloz  Administration: Ruth Yan  Admitting: Naresh Vitale  Attending: Sandra Carter  Consultant: Sandra Carter  Consultant: Rashid Stout  Consultant: Caren Cobos  Consultant: Jonathan Baxter  Consultant: Henrique Chisholm  Consultant: Saima Hernandez  Covering Team: Lesley Gutierrez  ED Attending: Christiano Delgado ED Nurse: Ruth De Jesus  Nurse: Ruth Hameed  Nurse: Juhi Clay  Nurse: Mitesh Davis  Nurse: Estevan Cramer  Oncology: Marimar Garcia  Oncology: Madison Soto  Oncology: Jeffery Talley  Ordered: Doctor, Unknown  Ordered: ServiceAccount, [facility]  Outpatient Provider: Renny Gallegos  Outpatient Provider: Henrique Chisholm  Outpatient Provider: Severiano Rodas  Outpatient Provider: Rashid Stout  Override: Dominga Diez  Override: Andreina Akins  PCA/Nursing Assistant: Audra Block  Primary Team: Connie Linder  Primary Team: Dianne Sloan  Primary Team: Gildardo Peterson  Primary Team: Dennis Morales  Primary Team: Hussain Orozco  Registered Dietitian: Richelle Doty  Respiratory Therapy: Gualberto Shaver  Respiratory Therapy: Zaid Walters  Respiratory Therapy: Megan Echevarria  : Enid Myers  Student: Berhane Byrne  Team: [facility], Team  UR// Supp. Assoc.: En Jaramillo

## 2025-06-09 NOTE — PROGRESS NOTE ADULT - PROBLEM SELECTOR PLAN 8
chronic rate controlled   would benefit overall from therapeutic AC but given low h/h, agree w/ heparin sq for now  c/w amiodarone 100mg qd.  -plan per ICU

## 2025-06-09 NOTE — CARE COORDINATION ASSESSMENT. - NSDCPLANSERVICES_GEN_ALL_CORE
Pt known to [facility] for short term care, pt in agreement to return to [facility] when medically cleared./Same as Prior Admission

## 2025-06-09 NOTE — PROGRESS NOTE ADULT - PROBLEM SELECTOR PLAN 2
-likely due to shock state started on levophed   -cont midodrine   -serial enzymes reviewed  -TTE performed 6/2 showing normal LV function with EF 71% and LV hypertrophy, normal RV size and function, moderate LA dilation   -no urgent need for TTE, will consider repeat  -cardio following   -plan per ICU

## 2025-06-09 NOTE — PROGRESS NOTE ADULT - SUBJECTIVE AND OBJECTIVE BOX
[facility]                                                       Dr. Chisholm, Dr. Franklin, Dr. Baron, Dr. Clark, Dr. Stock                                      [facility], [facility], [address]                                                 [address] [place] [address]                                      Ph: [phone]  Fax: [phone]                                         Ph: [phone]  Fax: [phone]      Patient is a 84y old  Female who presents with a chief complaint of septic shock, PNA (09 Jun 2025 12:36)  Patient seen in follow up for KIMBERLY on CKD>        PAST MEDICAL HISTORY:  COPD (chronic obstructive pulmonary disease)    DM (diabetes mellitus)    Pulmonary fibrosis    PAF (paroxysmal atrial fibrillation)    Hiatal hernia    GIB (gastrointestinal bleeding)    Pericarditis    Shoulder fracture, right    Hematoma    H/O aplastic anemia    History of IBS    H/O osteoporosis    HLD (hyperlipidemia)    PMR (polymyalgia rheumatica)    History of basal cell cancer    Chronic kidney disease (CKD)    Hypotension    Presence of IVC filter    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    History of immunodeficiency    Lumbar compression fracture    H/O hiatal hernia    H/O pulmonary fibrosis    H/O sinus bradycardia    History of fracture of right hip      MEDICATIONS  (STANDING):  aMIOdarone    Tablet 100 milliGRAM(s) Oral daily  azithromycin  IVPB      azithromycin  IVPB 500 milliGRAM(s) IV Intermittent every 24 hours  chlorhexidine 2% Cloths 1 Application(s) Topical <User Schedule>  droxidopa 100 milliGRAM(s) Oral every 8 hours  fluticasone propionate/ salmeterol 500-50 MICROgram(s) Diskus 1 Dose(s) Inhalation two times a day  gabapentin 400 milliGRAM(s) Oral every 12 hours  heparin   Injectable 5000 Unit(s) SubCutaneous every 8 hours  hydrocortisone sodium succinate Injectable 50 milliGRAM(s) IV Push every 6 hours  insulin lispro (ADMELOG) corrective regimen sliding scale   SubCutaneous every 6 hours  lactated ringers. 1000 milliLiter(s) (50 mL/Hr) IV Continuous <Continuous>  midodrine 10 milliGRAM(s) Oral every 8 hours  montelukast 10 milliGRAM(s) Oral daily  norepinephrine Infusion 0.04 MICROgram(s)/kG/Min (5.18 mL/Hr) IV Continuous <Continuous>  pantoprazole  Injectable 40 milliGRAM(s) IV Push daily  piperacillin/tazobactam IVPB.. 3.375 Gram(s) IV Intermittent every 8 hours  tiotropium 2.5 MICROgram(s) Inhaler 2 Puff(s) Inhalation daily    MEDICATIONS  (PRN):    T(C): 37.2 (06-09-25 @ 13:30), Max: 38.3 (06-08-25 @ 11:30)  HR: 69 (06-09-25 @ 13:30) (49 - 84)  BP: 117/51 (06-09-25 @ 13:30) (72/47 - 154/48)  RR: 18 (06-09-25 @ 13:30) (14 - 25)  SpO2: 99% (06-09-25 @ 13:30) (96% - 100%)  Wt(kg): --  I&O's Detail    08 Jun 2025 07:01  -  09 Jun 2025 07:00  --------------------------------------------------------  IN:    IV PiggyBack: 50 mL    IV PiggyBack: 125 mL    Lactated Ringers: 650 mL    Norepinephrine: 113.4 mL    PRBCs (Packed Red Blood Cells): 324 mL  Total IN: 1262.4 mL    OUT:    Voided (mL): 900 mL  Total OUT: 900 mL    Total NET: 362.4 mL      09 Jun 2025 07:01  -  09 Jun 2025 13:48  --------------------------------------------------------  IN:    IV PiggyBack: 100 mL    Norepinephrine: 20 mL    Norepinephrine: 3.8 mL  Total IN: 123.8 mL    OUT:    Lactated Ringers: 0 mL    Voided (mL): 300 mL  Total OUT: 300 mL    Total NET: -176.2 mL          PHYSICAL EXAM:  General: No distress  Respiratory: b/l air entry  Cardiovascular: S1 S2  Gastrointestinal: soft  Extremities:  edema                              7.8    24.78 )-----------( 190      ( 09 Jun 2025 05:48 )             24.1     06-09    137  |  100  |  62[H]  ----------------------------<  144[H]  3.8   |  27  |  1.40[H]    Ca    8.2[L]      09 Jun 2025 05:48  Phos  4.0     06-09  Mg     1.8     06-09    TPro  5.5[L]  /  Alb  2.8[L]  /  TBili  0.8  /  DBili  x   /  AST  9[L]  /  ALT  32  /  AlkPhos  38[L]  06-09    CARDIAC MARKERS ( 09 Jun 2025 05:48 )  x     / x     / x     / x     / <1.0 ng/mL  CARDIAC MARKERS ( 08 Jun 2025 19:35 )  x     / x     / x     / x     / <1.0 ng/mL  CARDIAC MARKERS ( 08 Jun 2025 11:20 )  x     / x     / x     / x     / <1.0 ng/mL      LIVER FUNCTIONS - ( 09 Jun 2025 05:48 )  Alb: 2.8 g/dL / Pro: 5.5 g/dL / ALK PHOS: 38 U/L / ALT: 32 U/L / AST: 9 U/L / GGT: x           Urinalysis Basic - ( 09 Jun 2025 05:48 )    Color: x / Appearance: x / SG: x / pH: x  Gluc: 144 mg/dL / Ketone: x  / Bili: x / Urobili: x   Blood: x / Protein: x / Nitrite: x   Leuk Esterase: x / RBC: x / WBC x   Sq Epi: x / Non Sq Epi: x / Bacteria: x        Sodium, Serum: 137 (06-09 @ 05:48)  Sodium, Serum: 134 (06-08 @ 19:35)  Sodium, Serum: 137 (06-08 @ 11:20)    Creatinine, Serum: 1.40 (06-09 @ 05:48)  Creatinine, Serum: 2.00 (06-08 @ 19:35)  Creatinine, Serum: 1.90 (06-08 @ 11:20)    Potassium, Serum: 3.8 (06-09 @ 05:48)  Potassium, Serum: 4.1 (06-08 @ 19:35)  Potassium, Serum: 4.0 (06-08 @ 11:20)    Hemoglobin: 7.8 (06-09 @ 05:48)  Hemoglobin: 8.7 (06-08 @ 19:35)  Hemoglobin: 7.5 (06-08 @ 11:20)

## 2025-06-09 NOTE — CARE COORDINATION ASSESSMENT. - REASON FOR CONSULT
Met with pt and her daughter/Lenore at bedside in order to conduct assessment and to discuss SW roles with good understanding./coordination of care/discharge planning

## 2025-06-09 NOTE — CONSULT NOTE ADULT - SUBJECTIVE AND OBJECTIVE BOX
INCOMPLETE NOTE.  Documentation in Progress  PT SEEN AND EVALUATED.   FULL/ADDITIONAL RECOMMENDATIONS TO FOLLOW   ***************************************************************    Patient is a 84y old  Female who presents with a chief complaint of septic shock, PNA (2025 15:04)    HPI:  84 yr old female with PMHx afib on eliquis, COPD (not on home O2), PE/Rt lower extremity DVT , Rt LE IVC filter  CKD3, aplastic anemia, polymyalgia rheumatica on prednisone 5 mg po qd, requiring PRBC transfusions ~8 weeks (via Rt subclavian mediport), AVN bilat hips, HTN, HLD, HFpEF (75% 6.2.25), diastolic dysfx grade1, recent hospitalization 25-25 for AHDF/COPD exacerbation, pt also with hx of IVC filter, had Rt subclavian mediport. Pt with eventual improvement and transfer to [facility] facility from where she presents to E.D. today with c/o Rt sided chest pain. general malaise. Pt stated began to feel ill evening before presentation, daughter this a.m. endorsed pt lethargic, pale.     ED course  Vitals: 99.9 T , HR 71 , BP 82/48, RR 16 , SpO2 99% on 3L  Significant labs: WBC 32.33 Hgb 7.5 BUN 72 Cr 1.9 Mag 1.4 procal 13.61 pro-bnp 6599  RVP neg  Imaging: CXR: Dense infiltrate off the right upper hilum is presently seen  EKG: Sinus rhythm 80 bpm with premature supraventricular complexes Left axis deviation Possible Lateral infarct, age undetermined  In ED patient given: azithromycin x1, zosyn x1, vanco x1, IVF NS 500mL bolus x1, ofirmev x1, midodrine 10mg x1, 1U PRBC       (2025 15:50)    PAST MEDICAL & SURGICAL HISTORY:  COPD (chronic obstructive pulmonary disease)      DM (diabetes mellitus)  diet-controlled      PAF (paroxysmal atrial fibrillation)  s/p ablation      Hiatal hernia      H/O aplastic anemia      History of IBS      H/O osteoporosis      HLD (hyperlipidemia)      PMR (polymyalgia rheumatica)      History of basal cell cancer      Chronic kidney disease (CKD)      Hypotension      Presence of IVC filter      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      History of immunodeficiency      Lumbar compression fracture      H/O hiatal hernia      H/O pulmonary fibrosis      H/O sinus bradycardia      History of fracture of right hip  "unoperable"      S/P hysterectomy      S/P foot surgery      S/P knee surgery      After cataract  bilateral eye cataract surgically removed      Closed right hip fracture  rigth hip ORIF 2016      S/P IVC filter  2016      S/P carpal tunnel release  Right  & 17      H/O kyphoplasty      Port-A-Cath in place  right chest         HEALTH ISSUES - PROBLEM Dx:  Septic shock    Hypotension    Aplastic anemia    Elevated INR    Acute kidney injury superimposed on CKD    Type 2 diabetes mellitus    COPD (chronic obstructive pulmonary disease)    Atrial fibrillation    Polymyalgia rheumatica    HLD (hyperlipidemia)    Need for prophylactic measure          Pneumonia due to infectious organism [J18.9]    COPD (chronic obstructive pulmonary disease) [496]    DM (diabetes mellitus) [250.00]    Pulmonary fibrosis [515]    PAF (paroxysmal atrial fibrillation) [427.31]    Hiatal hernia [553.3]    GIB (gastrointestinal bleeding) [578.9]    Pericarditis [423.9]    Shoulder fracture, right [812.00]    Hematoma [T14.8]    H/O aplastic anemia [Z86.2]    History of IBS [Z87.19]    H/O osteoporosis [Z87.39]    HLD (hyperlipidemia) [E78.5]    PMR (polymyalgia rheumatica) [M35.3]    History of basal cell cancer [Z85.828]    Chronic kidney disease (CKD) [N18.9]    Hypotension [I95.9]    Presence of IVC filter [Z95.828]    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 [M87.051]    History of immunodeficiency [Z86.2]    Lumbar compression fracture [S32.000A]    H/O hiatal hernia [Z87.19]    H/O pulmonary fibrosis [Z87.09]    H/O sinus bradycardia [Z86.79]    History of fracture of right hip [Z87.81]    S/P hysterectomy [V88.01]    S/P foot surgery [V45.89]    S/P knee surgery [V45.89]    After cataract [366.50]    Closed right hip fracture [S72.001A]    S/P IVC filter [Z95.828]    S/P carpal tunnel release [Z98.890]    H/O kyphoplasty [Z98.890]    Port-A-Cath in place [Z95.828]      FAMILY HISTORY:  Family history of bladder cancer (Mother)    Family history of myocardial infarction (Father)    FH: atrial fibrillation (Father)        [SOCIAL HISTORY: ]     smoking:  none currently     EtOH:  none currently     illicit drugs:  none currently     occupation:  Retired     marital status:       Other:       [ALLERGIES/INTOLERANCES:]  Allergies    No Known Allergies    Intolerances        [MEDICATIONS]  MEDICATIONS  (STANDING):  aMIOdarone    Tablet 100 milliGRAM(s) Oral daily  azithromycin  IVPB      azithromycin  IVPB 500 milliGRAM(s) IV Intermittent every 24 hours  chlorhexidine 2% Cloths 1 Application(s) Topical <User Schedule>  dextrose 5%. 1000 milliLiter(s) (50 mL/Hr) IV Continuous <Continuous>  dextrose 5%. 1000 milliLiter(s) (100 mL/Hr) IV Continuous <Continuous>  dextrose 50% Injectable 25 Gram(s) IV Push once  dextrose 50% Injectable 12.5 Gram(s) IV Push once  dextrose 50% Injectable 25 Gram(s) IV Push once  droxidopa 100 milliGRAM(s) Oral every 8 hours  fluticasone propionate/ salmeterol 500-50 MICROgram(s) Diskus 1 Dose(s) Inhalation two times a day  gabapentin 400 milliGRAM(s) Oral every 12 hours  glucagon  Injectable 1 milliGRAM(s) IntraMuscular once  heparin   Injectable 5000 Unit(s) SubCutaneous every 8 hours  hydrocortisone sodium succinate Injectable 50 milliGRAM(s) IV Push every 6 hours  insulin lispro (ADMELOG) corrective regimen sliding scale   SubCutaneous three times a day before meals  insulin lispro (ADMELOG) corrective regimen sliding scale   SubCutaneous at bedtime  midodrine 10 milliGRAM(s) Oral every 8 hours  montelukast 10 milliGRAM(s) Oral daily  pantoprazole  Injectable 40 milliGRAM(s) IV Push daily  piperacillin/tazobactam IVPB.. 3.375 Gram(s) IV Intermittent every 8 hours  tiotropium 2.5 MICROgram(s) Inhaler 2 Puff(s) Inhalation daily    MEDICATIONS  (PRN):  dextrose Oral Gel 15 Gram(s) Oral once PRN Blood Glucose LESS THAN 70 milliGRAM(s)/deciliter      [REVIEW OF SYSTEMS: ]  CONSTITUTIONAL: normal, no fever, no shakes, no chills   EYES: No eye pain, no visual disturbances, no discharge  ENMT:  no discharge  NECK: No pain, no stiffness  BREASTS: No pain, no masses, no nipple discharge  RESPIRATORY: No cough, no wheezing, no chills, no hemoptysis; No shortness of breath  CARDIOVASCULAR: No chest pain, no palpitations, no dizziness, no leg swelling  GASTROINTESTINAL: No abdominal, no epigastric pain. No nausea, no vomiting, no hematemesis; No diarrhea , no constipation. No melena, no hematochezia.  GENITOURINARY: No dysuria, no frequency, no hematuria, no incontinence  NEUROLOGICAL: No headaches, no memory loss, no loss of strength, no numbness, no tremors  SKIN: No itching, no burning, no rashes, no lesions   LYMPH NODES: No enlarged glands  ENDOCRINE: No heat or cold intolerance; No hair loss  MUSCULOSKELETAL: No joint pain or swelling; No muscle, no back, no extremity pain  PSYCHIATRIC: No depression, no anxiety, no mood swings, no difficulty sleeping  HEME/LYMPH: No easy bruising, no bleeding gums    [VITALS SIGNS 24hrs]  Vital Signs Last 24 Hrs  T(C): 36.8 (2025 21:50), Max: 37.4 (2025 09:30)  T(F): 98.3 (2025 21:50), Max: 99.3 (2025 09:30)  HR: 58 (2025 21:50) (49 - 87)  BP: 112/68 (2025 21:50) (77/31 - 154/48)  BP(mean): 60 (2025 21:00) (36 - 81)  RR: 18 (2025 21:50) (14 - 26)  SpO2: 96% (2025 21:50) (92% - 100%)    Parameters below as of 2025 21:50  Patient On (Oxygen Delivery Method): nasal cannula  O2 Flow (L/min): 2    Daily     Daily Weight in k.4 (2025 21:50)    I&O's Summary    2025 07:01  -  2025 07:00  --------------------------------------------------------  IN: 1262.4 mL / OUT: 900 mL / NET: 362.4 mL    2025 07:01  -  2025 23:52  --------------------------------------------------------  IN: 617.6 mL / OUT: 400 mL / NET: 217.6 mL        [PHYSICAL EXAM]  GEN:   HEENT: normocephalic and atraumatic. EOMI. PERRL.    NECK: Supple.  No lymphadenopathy   LUNGS: Clear to auscultation.  HEART: S1S2 Regular rate and rhythm, no MRG  ABDOMEN: Soft, nontender, and nondistended.  Positive bowel sounds.    : No CVA tenderness  EXTREMITIES: Without edema.  NEUROLOGIC: grossly intact.  PSYCHIATRIC: Appropriate affect .  SKIN: No rash     [LABS: ]                        7.8    24.78 )-----------( 190      ( 2025 05:48 )             24.1     CBC Full  -  ( 2025 05:48 )  WBC Count : 24.78 K/uL  RBC Count : 2.52 M/uL  Hemoglobin : 7.8 g/dL  Hematocrit : 24.1 %  Platelet Count - Automated : 190 K/uL  Mean Cell Volume : 95.6 fl  Mean Cell Hemoglobin : 31.0 pg  Mean Cell Hemoglobin Concentration : 32.4 g/dL  Auto Neutrophil # : x  Auto Lymphocyte # : x  Auto Monocyte # : x  Auto Eosinophil # : x  Auto Basophil # : x  Auto Neutrophil % : x  Auto Lymphocyte % : x  Auto Monocyte % : x  Auto Eosinophil % : x  Auto Basophil % : x        137  |  100  |  62[H]  ----------------------------<  144[H]  3.8   |  27  |  1.40[H]    Ca    8.2[L]      2025 05:48  Phos  4.0       Mg     1.8         TPro  5.5[L]  /  Alb  2.8[L]  /  TBili  0.8  /  DBili  x   /  AST  9[L]  /  ALT  32  /  AlkPhos  38[L]      PT/INR - ( 2025 09:10 )   PT: 19.1 sec;   INR: 1.63 ratio         PTT - ( 2025 19:35 )  PTT:37.9 sec  LIVER FUNCTIONS - ( 2025 05:48 )  Alb: 2.8 g/dL / Pro: 5.5 g/dL / ALK PHOS: 38 U/L / ALT: 32 U/L / AST: 9 U/L / GGT: x           CARDIAC MARKERS ( 2025 05:48 )  x     / x     / x     / x     / <1.0 ng/mL  CARDIAC MARKERS ( 2025 19:35 )  x     / x     / x     / x     / <1.0 ng/mL  CARDIAC MARKERS ( 2025 11:20 )  x     / x     / x     / x     / <1.0 ng/mL      Urinalysis Basic - ( 2025 05:48 )    Color: x / Appearance: x / SG: x / pH: x  Gluc: 144 mg/dL / Ketone: x  / Bili: x / Urobili: x   Blood: x / Protein: x / Nitrite: x   Leuk Esterase: x / RBC: x / WBC x   Sq Epi: x / Non Sq Epi: x / Bacteria: x          CBC TREND (5 Days)  WBC Count: 24.78 K/uL ( @ 05:48)  WBC Count: 42.66 K/uL ( @ 19:35)  WBC Count: 32.33 K/uL ( @ 11:20)    Hemoglobin: 7.8 g/dL ( @ 05:48)  Hemoglobin: 8.7 g/dL ( @ 19:35)  Hemoglobin: 7.5 g/dL ( @ 11:20)    Hematocrit: 24.1 % ( @ 05:48)  Hematocrit: 27.3 % ( @ 19:35)  Hematocrit: 23.2 % ( @ 11:20)    Platelet Count - Automated: 190 K/uL ( @ 05:48)  Platelet Count - Automated: 200 K/uL ( @ 19:35)  Platelet Count - Automated: 153 K/uL ( @ 11:20)    Reticulocyte Percent: 2.0 % ( @ 14:00)      Ferritin: 1342 ng/mL ( @ 14:00)    Iron Total: 242 ug/dL ( @ 14:00)     Vitamin B12, Serum: 1444 pg/mL ( @ 09:20)  Vitamin B12, Serum: 663 pg/mL (:00)     Folate, Serum: >20.0 ng/mL ( @ 09:20)  Folate, Serum: 16.2 ng/mL ( @ 14:00)     Sedimentation Rate, Erythrocyte: 17 mm/hr ( @ 11:20)      Serum Protein Electrophoresis Interp: Normal Electrophoresis Pattern ( @ 14:00)             Quantitative Ig mg/dL ( @ 14:00)  Quantitative IgA: 102 mg/dL (:00)  Quantitative IgM: 192 mg/dL (:00)     [place]/Lambda Free Light Chain Ratio, Serum: 1.64 Ratio ( 14:00)         [MICROBIOLOGY /  VIROLOGY:]  SARS-CoV-2: NotDetec (26 May 2025 18:00)        Culture - Sputum (collected 2025 18:25)  Source: Sputum Sputum  Gram Stain (2025 22:12):    Few Squamous epithelial cells per low power field    Few polymorphonuclear leukocytes per low power field    Few Gram Positive Cocci in Pairs and Chains per oil power field  Preliminary Report (2025 14:32):    Commensal marilee consistent with body site    Urinalysis with Rflx Culture (collected 2025 15:44)    Culture - Urine (collected 2025 15:44)  Source: Clean Catch  Final Report (2025 16:05):    <10,000 CFU/mL Normal Urogenital Marilee    Culture - Blood (collected 2025 11:20)  Source: Blood Blood-Peripheral  Preliminary Report (2025 14:02):    No growth at 24 hours    Culture - Blood (collected 2025 11:15)  Source: Blood Blood-Peripheral  Preliminary Report (2025 14:02):    No growth at 24 hours        [PATHOLOGY]       [RADIOLOGY & ADDITIONAL STUDIES:]      Patient is a 84y old  Female who presents with a chief complaint of septic shock, PNA (2025 15:04)    HPI:  84 yr old female with PMHx afib on eliquis, COPD (not on home O2), PE/Rt lower extremity DVT , Rt LE IVC filter 2017 CKD3, aplastic anemia, polymyalgia rheumatica on prednisone 5 mg po qd, requiring PRBC transfusions ~8 weeks (via Rt subclavian mediport), AVN bilat hips, HTN, HLD, HFpEF (75% 6.2.25), diastolic dysfx grade1, recent hospitalization 25-25 for AHDF/COPD exacerbation, pt also with hx of IVC filter, had Rt subclavian mediport. Pt with eventual improvement and transfer to [facility] facility from where she presents to E.D. today with c/o Rt sided chest pain. general malaise. Pt stated began to feel ill evening before presentation, daughter this a.m. endorsed pt lethargic, pale.     ED course  Vitals: 99.9 T , HR 71 , BP 82/48, RR 16 , SpO2 99% on 3L  Significant labs: WBC 32.33 Hgb 7.5 BUN 72 Cr 1.9 Mag 1.4 procal 13.61 pro-bnp 6599  RVP neg  Imaging: CXR: Dense infiltrate off the right upper hilum is presently seen  EKG: Sinus rhythm 80 bpm with premature supraventricular complexes Left axis deviation Possible Lateral infarct, age undetermined  In ED patient given: azithromycin x1, zosyn x1, vanco x1, IVF NS 500mL bolus x1, ofirmev x1, midodrine 10mg x1, 1U PRBC       (2025 15:50)    PAST MEDICAL & SURGICAL HISTORY:  COPD (chronic obstructive pulmonary disease)  DM (diabetes mellitus) diet-controlled  PAF (paroxysmal atrial fibrillation) s/p ablation  Hiatal hernia  H/O aplastic anemia  History of IBS  H/O osteoporosis  HLD (hyperlipidemia)  PMR (polymyalgia rheumatica)  History of basal cell cancer  Chronic kidney disease (CKD)  Hypotension  Presence of IVC filter  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  History of immunodeficiency  Lumbar compression fracture  H/O hiatal hernia  H/O pulmonary fibrosis  H/O sinus bradycardia  History of fracture of right hip "unoperable"  S/P hysterectomy  S/P foot surgery  S/P knee surgery  After cataract bilateral eye cataract surgically removed  Closed right hip fracture rigth hip ORIF 2016  S/P IVC filter 2016  S/P carpal tunnel release Right  & 17  H/O kyphoplasty  Port-A-Cath in place right chest       HEALTH ISSUES - PROBLEM Dx:  Septic shock  Hypotension  Aplastic anemia  Elevated INR  Acute kidney injury superimposed on CKD  Type 2 diabetes mellitus  COPD (chronic obstructive pulmonary disease)  Atrial fibrillation  Polymyalgia rheumatica  ILD (hyperlipidemia)  Need for prophylactic measure    Pneumonia due to infectious organism [J18.9]  COPD (chronic obstructive pulmonary disease) [496]  DM (diabetes mellitus) [250.00]  Pulmonary fibrosis [515]  PAF (paroxysmal atrial fibrillation) [427.31]  Hiatal hernia [553.3]  GIB (gastrointestinal bleeding) [578.9]  Pericarditis [423.9]  Shoulder fracture, right [812.00]  Hematoma [T14.8]  H/O aplastic anemia [Z86.2]  History of IBS [Z87.19]  H/O osteoporosis [Z87.39]  HLD (hyperlipidemia) [E78.5]  PMR (polymyalgia rheumatica) [M35.3]  History of basal cell cancer [Z85.828]  Chronic kidney disease (CKD) [N18.9]  Hypotension [I95.9]  Presence of IVC filter [Z95.828]  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 [M87.051]  History of immunodeficiency [Z86.2]  Lumbar compression fracture [S32.000A]  H/O hiatal hernia [Z87.19]  H/O pulmonary fibrosis [Z87.09]  H/O sinus bradycardia [Z86.79]  History of fracture of right hip [Z87.81]  S/P hysterectomy [V88.01]  S/P foot surgery [V45.89]  S/P knee surgery [V45.89]  After cataract [366.50]  Closed right hip fracture [S72.001A]  S/P IVC filter [Z95.828]  S/P carpal tunnel release [Z98.890]  H/O kyphoplasty [Z98.890]  Port-A-Cath in place [Z95.828]      FAMILY HISTORY:  Family history of bladder cancer (Mother)  Family history of myocardial infarction (Father)  FH: atrial fibrillation (Father)      [SOCIAL HISTORY: ]     smoking:  none currently     EtOH:  none currently     illicit drugs:  none currently     occupation:  Retired     marital status:       Other:       [ALLERGIES/INTOLERANCES:]  Allergies      No Known Allergies  Intolerances      [MEDICATIONS]  MEDICATIONS  (STANDING):  aMIOdarone    Tablet 100 milliGRAM(s) Oral daily  azithromycin  IVPB      azithromycin  IVPB 500 milliGRAM(s) IV Intermittent every 24 hours  chlorhexidine 2% Cloths 1 Application(s) Topical <User Schedule>  dextrose 5%. 1000 milliLiter(s) (50 mL/Hr) IV Continuous <Continuous>  dextrose 5%. 1000 milliLiter(s) (100 mL/Hr) IV Continuous <Continuous>  dextrose 50% Injectable 25 Gram(s) IV Push once  dextrose 50% Injectable 12.5 Gram(s) IV Push once  dextrose 50% Injectable 25 Gram(s) IV Push once  droxidopa 100 milliGRAM(s) Oral every 8 hours  fluticasone propionate/ salmeterol 500-50 MICROgram(s) Diskus 1 Dose(s) Inhalation two times a day  gabapentin 400 milliGRAM(s) Oral every 12 hours  glucagon  Injectable 1 milliGRAM(s) IntraMuscular once  heparin   Injectable 5000 Unit(s) SubCutaneous every 8 hours  hydrocortisone sodium succinate Injectable 50 milliGRAM(s) IV Push every 6 hours  insulin lispro (ADMELOG) corrective regimen sliding scale   SubCutaneous three times a day before meals  insulin lispro (ADMELOG) corrective regimen sliding scale   SubCutaneous at bedtime  midodrine 10 milliGRAM(s) Oral every 8 hours  montelukast 10 milliGRAM(s) Oral daily  pantoprazole  Injectable 40 milliGRAM(s) IV Push daily  piperacillin/tazobactam IVPB.. 3.375 Gram(s) IV Intermittent every 8 hours  tiotropium 2.5 MICROgram(s) Inhaler 2 Puff(s) Inhalation daily      MEDICATIONS  (PRN):  dextrose Oral Gel 15 Gram(s) Oral once PRN Blood Glucose LESS THAN 70 milliGRAM(s)/deciliter      [REVIEW OF SYSTEMS: ]  CONSTITUTIONAL: normal, no fever, no shakes, no chills   EYES: No eye pain, no visual disturbances, no discharge  ENMT:  no discharge  NECK: No pain, no stiffness  BREASTS: No pain, no masses, no nipple discharge  RESPIRATORY: No cough, no wheezing, no chills, no hemoptysis; No shortness of breath  CARDIOVASCULAR: No chest pain, no palpitations, no dizziness, no leg swelling  GASTROINTESTINAL: No abdominal, no epigastric pain. No nausea, no vomiting, no hematemesis; No diarrhea , no constipation. No melena, no hematochezia.  GENITOURINARY: No dysuria, no frequency, no hematuria, no incontinence  NEUROLOGICAL: No headaches, no memory loss, no loss of strength, no numbness, no tremors  SKIN: No itching, no burning, no rashes, no lesions   LYMPH NODES: No enlarged glands  ENDOCRINE: No heat or cold intolerance; No hair loss  MUSCULOSKELETAL: No joint pain or swelling; No muscle, no back, no extremity pain  PSYCHIATRIC: No depression, no anxiety, no mood swings, no difficulty sleeping  HEME/LYMPH: No easy bruising, no bleeding gums      [VITALS SIGNS 24hrs]  Vital Signs Last 24 Hrs  T(C): 36.8 (2025 21:50), Max: 37.4 (2025 09:30)  T(F): 98.3 (2025 21:50), Max: 99.3 (2025 09:30)  HR: 58 (2025 21:50) (49 - 87)  BP: 112/68 (2025 21:50) (77/31 - 154/48)  BP(mean): 60 (2025 21:00) (36 - 81)  RR: 18 (2025 21:50) (14 - 26)  SpO2: 96% (2025 21:50) (92% - 100%)    Parameters below as of 2025 21:50  Patient On (Oxygen Delivery Method): nasal cannula  O2 Flow (L/min): 2    Daily     Daily Weight in k.4 (2025 21:50)    I&O's Summary    2025 07:01  -  2025 07:00  --------------------------------------------------------  IN: 1262.4 mL / OUT: 900 mL / NET: 362.4 mL    2025 07: 23:52  --------------------------------------------------------  IN: 617.6 mL / OUT: 400 mL / NET: 217.6 mL      [PHYSICAL EXAM]  GEN:   HEENT: normocephalic and atraumatic. EOMI. PERRL.    NECK: Supple.  No lymphadenopathy   LUNGS: Clear to auscultation.  HEART: S1S2 Regular rate and rhythm, no MRG  ABDOMEN: Soft, nontender, and nondistended.  Positive bowel sounds.    : No CVA tenderness  EXTREMITIES: Without edema.  NEUROLOGIC: grossly intact.  PSYCHIATRIC: Appropriate affect .  SKIN: No rash     [LABS: ]             7.8    24.78 )-----------( 190      ( 2025 05:48 )             24.1   CBC Full  -  ( 2025 05:48 )  WBC Count : 24.78 K/uL  RBC Count : 2.52 M/uL  Hemoglobin : 7.8 g/dL  Hematocrit : 24.1 %  Platelet Count - Automated : 190 K/uL  Mean Cell Volume : 95.6 fl  Mean Cell Hemoglobin : 31.0 pg  Mean Cell Hemoglobin Concentration : 32.4 g/dL  Auto Neutrophil # : x  Auto Lymphocyte # : x  Auto Monocyte # : x  Auto Eosinophil # : x  Auto Basophil # : x  Auto Neutrophil % : x  Auto Lymphocyte % : x  Auto Monocyte % : x  Auto Eosinophil % : x  Auto Basophil % : x      137  |  100  |  62[H]  ----------------------------<  144[H]  3.8   |  27  |  1.40[H]  Ca    8.2[L]      2025 05:48  Phos  4.0       Mg     1.8       TPro  5.5[L]  /  Alb  2.8[L]  /  TBili  0.8  /  DBili  x   /  AST  9[L]  /  ALT  32  /  AlkPhos  38[L]    PT/INR - ( 2025 09:10 )   PT: 19.1 sec;   INR: 1.63 ratio    PTT - ( 2025 19:35 )  PTT:37.9 sec  LIVER FUNCTIONS - ( 2025 05:48 )  Alb: 2.8 g/dL / Pro: 5.5 g/dL / ALK PHOS: 38 U/L / ALT: 32 U/L / AST: 9 U/L / GGT: x           CARDIAC MARKERS ( 2025 05:48 )  x     / x     / x     / x     / <1.0 ng/mL  CARDIAC MARKERS ( 2025 19:35 )  x     / x     / x     / x     / <1.0 ng/mL  CARDIAC MARKERS ( 2025 11:20 )  x     / x     / x     / x     / <1.0 ng/mL    Urinalysis Basic - ( 2025 05:48 )  Color: x / Appearance: x / SG: x / pH: x  Gluc: 144 mg/dL / Ketone: x  / Bili: x / Urobili: x   Blood: x / Protein: x / Nitrite: x   Leuk Esterase: x / RBC: x / WBC x   Sq Epi: x / Non Sq Epi: x / Bacteria: x    CBC TREND (5 Days)  WBC Count: 24.78 K/uL ( @ 05:48)  WBC Count: 42.66 K/uL ( @ 19:35)  WBC Count: 32.33 K/uL ( @ 11:20)  Hemoglobin: 7.8 g/dL ( @ 05:48)  Hemoglobin: 8.7 g/dL ( @ 19:35)  Hemoglobin: 7.5 g/dL ( @ 11:20)  Hematocrit: 24.1 % ( @ 05:48)  Hematocrit: 27.3 % ( @ 19:35)  Hematocrit: 23.2 % ( @ 11:20)  Platelet Count - Automated: 190 K/uL ( @ 05:48)  Platelet Count - Automated: 200 K/uL ( @ 19:35)  Platelet Count - Automated: 153 K/uL ( @ 11:20)    Reticulocyte Percent: 2.0 % ( @ 14:00)  Ferritin: 1342 ng/mL ( @ 14:00)  Iron Total: 242 ug/dL ( @ 14:00)     Vitamin B12, Serum: 1444 pg/mL ( @ 09:20)  Vitamin B12, Serum: 663 pg/mL ( @ 14:00)     Folate, Serum: >20.0 ng/mL ( @ 09:20)  Folate, Serum: 16.2 ng/mL ( @ 14:00)     Sedimentation Rate, Erythrocyte: 17 mm/hr ( @ 11:20)  Serum Protein Electrophoresis Interp: Normal Electrophoresis Pattern ( @ 14:00)     Quantitative Ig mg/dL ( @ 14:00)  Quantitative IgA: 102 mg/dL ( @ 14:00)  Quantitative IgM: 192 mg/dL ( @ 14:00)  [place]/Lambda Free Light Chain Ratio, Serum: 1.64 Ratio ( @ 14:00)       [MICROBIOLOGY /  VIROLOGY:]  SARS-CoV-2: NotDetec (26 May 2025 18:00)    Culture - Sputum (collected 2025 18:25)  Source: Sputum Sputum  Gram Stain (2025 22:12):    Few Squamous epithelial cells per low power field    Few polymorphonuclear leukocytes per low power field    Few Gram Positive Cocci in Pairs and Chains per oil power field  Preliminary Report (2025 14:32):    Commensal marilee consistent with body site    Urinalysis with Rflx Culture (collected 2025 15:44)    Culture - Urine (collected 2025 15:44)  Source: Clean Catch  Final Report (2025 16:05):    <10,000 CFU/mL Normal Urogenital Marilee    Culture - Blood (collected 2025 11:20)  Source: Blood Blood-Peripheral  Preliminary Report (2025 14:02):    No growth at 24 hours    Culture - Blood (collected 2025 11:15)  Source: Blood Blood-Peripheral  Preliminary Report (2025 14:02):    No growth at 24 hours      [PATHOLOGY]       [RADIOLOGY & ADDITIONAL STUDIES:]     < from: Xray Chest 1 View- PORTABLE-Urgent (Xray Chest 1 View- PORTABLE-Urgent .) (25 @ 12:04) >  ACC: [id number] EXAM:  XR CHEST PORTABLE URGENT 1V   ORDERED BY: JACK LANE   PROCEDURE DATE:  2025    INTERPRETATION:  AP erect chest on 2025 at 11:44 AM. Patient has right-sided chest pain.  Heart magnified by technique. Right-sided port again noted.  Heart appears to be a T12 vertebroplasty density with partial collapse again noted.  On  there were mild bibasilar findings mainly pleural on the left and slight infiltrate in the right.  These are no longer evident.  The present film shows a dense infiltrate off the right upper hilum.  This is new.  IMPRESSION: Dense infiltrate off the right upper hilum is presently seen.  --- End of Report ---  YANNI LUNA MD; Attending Radiologist  This document has been electronically signed. 2025 12:33PM      < from: US Kidney and Bladder (25 @ 14:37) >  ACC: [id number] EXAM:  US KIDNEYS AND BLADDER   ORDERED BY: CAROL IGLESIAS   PROCEDURE DATE:  2025    INTERPRETATION:  CLINICAL INFORMATION: Chronic kidney disease. Urinary retention  COMPARISON: CT 2023, U/S  9/15/2023  TECHNIQUE: Sonography of the kidneys and bladder.  FINDINGS:  Right kidney: 9.2 cm. No renal mass, hydronephrosis or calculi. Scattered centimeter sized cysts and millimeter size cortical hyperechogenicities or calcifications, measuring up to approx 14mm in upper pole, not significantly changed  Left kidney: 9.5 cm. No renal mass, hydronephrosis or calculi.Scattered cysts, measuring up to 1.9 cm. 7 mm upper pole and 9 mm mid cortical hyperechogenicities  or calcifications, more prominent/enlarged from 9/15/2023  Urinary bladder: Within normal limits. Bladder base calcifications, measure up to 14 mm, corresponding to and possibly enlarged from CT 2023. 17 cc post void residual volume (PVR).  IMPRESSION:  1.  No hydronephrosis..  2.  Bilateral renal cortical hyperechogenicities or calcifications, increased/enlarged on LEFT and nonspecific  3.  Bladder base calcifications, possible enlarged from CT 2023.   Clinical correlation recommended  4.  17 cc PVR  --- End of Report ---  ANN MCGOVERN MD; Attending Radiologist  This document has been electronically signed. 2025 10:58PM  < end of copied text >        < from: CT Chest No Cont (25 @ 10:33) >    ACC: [id number] EXAM:  CT CHEST   ORDERED BY: KYLAH DAVE     PROCEDURE DATE:  2025          INTERPRETATION:  INDICATION: Shortness of breath    TECHNIQUE: Helical acquisition images of the chest without intravenous   contrast. Maximum intensity projection images were generated.    COMPARISON: CT chest 2025, unless stated otherwise.    FINDINGS:    LUNGS/AIRWAYS/PLEURA: No endobronchial lesion. Unchanged impacted small   airways in the right lower lobe, middle lobe, and lingula, and sub-5 mm   right middle lobe nodules dating back to . Increased small pleural   effusions.    LYMPH NODES/MEDIASTINUM: No lymphadenopathy.    HEART/VASCULATURE: Normal heart size. Trace pericardial fluid. Coronary   artery calcifications. Normal aortic size. Dilated 4.0 cm pulmonary   artery, larger than the ascending aorta. Right central venous catheter   tip in the superior vena cava.    UPPER ABDOMEN: Left renal cyst. Unchanged hyperdense structure in the   stomach since .    BONES/SOFT TISSUES: Age-indeterminate T8 compression fracture. L2   vertebroplasty.      IMPRESSION:  Increased small pleural effusions.  Dilated pulmonary artery suggestive of pulmonary hypertension.  --- End of Report ---  DEWAYNE FERGUSON M.D., ATTENDING RADIOLOGIST  This document has been electronically signed. May 31 2025 12:45PM

## 2025-06-09 NOTE — CONSULT NOTE ADULT - ASSESSMENT
IMPRESSION      INCOMPLETE NOTE.  Documentation in Progress  PT SEEN AND EVALUATED.   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 now admitted with COPD exacerbation    Hgb declined  s/p 1UPRBC 06/02   Hgb 7.6 increased to 8.5    BL LE dec BS  dyspnea    RECOMMENDATIONS    CHF and COPD  mgmt per medicine, pulm and Cardiology  on prednisone 40  on nebs, montelukast    Anemia  check FOBT,     if positive, re-assess risk benefits AC  Consider txfuse 1U  PRBC  typically get symptomatic when Hgb <8    Cont PPI with protonix 40mg  on prednisone 40mg    Afib, CHF  on Amiodarone  off Eliquis 2.5, on SQ heparin for VTE ppx  on torsemide, Midodrine   IMPRESSION  J18.8:      Other pneumonia, unspecified organism  A41.8:     Other specified sepsis  D46.7:    Other myelodysplastic syndromes  J44.0:      Chronic obstructive pulmonary disease with acute lower respiratory infection  I50        congestive heart failure  D63.8   Anemia chronic disease  D63.1   Anemia CKD  N18.31  CKD3a    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     BL LE dec BS  dyspnea    RECOMMENDATIONS    CHF and COPD  mgmt per medicine, pulm and Cardiology  on prednisone 40mg last admission  Currently on Hydrocortisone  on nebs, montelukast    Hypotension  BP mgmt per MICU    Pneumonia  on Zosyn and azithromycin  mgmt per ID    Anemia  re-check FOBT,     if positive, re-assess risk benefits AC  Consider txfuse 1U  PRBC  typically get symptomatic when Hgb <8    Resume procrit if no contraindication    Cont PPI with protonix 40mg    Afib, CHF  on Amiodarone  off Eliquis 2.5,   on , Midodrine      Supportive measures otherwise

## 2025-06-09 NOTE — PROGRESS NOTE ADULT - SUBJECTIVE AND OBJECTIVE BOX
Pt seen and evaluated  No new acute complaints  Still reports some wheezing  Still reports some dyspnea but improving  Had some hemoptysis yesterday and this also appears to be improving  Denies CP, palpitations  Denies nausea or vomiting  Generally reports feeling better but remains overall fatigued and reports generalized weakness  Full note to follow Name: MARTINEZ JONES  MRN: [id number]  LOCATION: [facility] ICU1 02A    ----  Patient is a 84y old  Female who presents with a chief complaint of septic shock, PNA (09 Jun 2025 15:04)      FROM ADMISSION H+P:   HPI:  84 yr old female with PMHx afib on eliquis, COPD (not on home O2), PE/Rt lower extremity DVT 2017, Rt LE IVC filter 2017 CKD3, aplastic anemia, polymyalgia rheumatica on prednisone 5 mg po qd, requiring PRBC transfusions ~8 weeks (via Rt subclavian mediport), AVN bilat hips, HTN, HLD, HFpEF (75% 6.2.25), diastolic dysfx grade1, recent hospitalization 5/25/25-6/5/25 for AHDF/COPD exacerbation, pt also with hx of IVC filter, had Rt subclavian mediport.     ----  INTERVAL HPI/OVERNIGHT EVENTS: Pt seen and evaluated at the bedside. No acute overnight events occurred.   Still reports some wheezing, still reports some dyspnea but improving, had some blood tinged sputum yesterday and this also appears to be improving  Generally reports feeling better but remains overall fatigued and reports generalized weakness with marginal improvement  Denies pain or swelling  Denies CP, palpitations  Denies nausea or vomiting  Denies fever or chills    ----  PAST MEDICAL & SURGICAL HISTORY:  COPD (chronic obstructive pulmonary disease)      DM (diabetes mellitus)  diet-controlled      PAF (paroxysmal atrial fibrillation)  s/p ablation      Hiatal hernia      H/O aplastic anemia      History of IBS      H/O osteoporosis      HLD (hyperlipidemia)      PMR (polymyalgia rheumatica)      History of basal cell cancer      Chronic kidney disease (CKD)      Hypotension      Presence of IVC filter      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      History of immunodeficiency      Lumbar compression fracture      H/O hiatal hernia      H/O pulmonary fibrosis      H/O sinus bradycardia      History of fracture of right hip  "unoperable"      S/P hysterectomy      S/P foot surgery      S/P knee surgery      After cataract  bilateral eye cataract surgically removed      Closed right hip fracture  rigth hip ORIF july 19 2016      S/P IVC filter  nov 2016      S/P carpal tunnel release  Right 2008 & 6/27/17      H/O kyphoplasty      Port-A-Cath in place  right chest          FAMILY HISTORY:  Family history of bladder cancer (Mother)    Family history of myocardial infarction (Father)    FH: atrial fibrillation (Father)        Allergies    No Known Allergies    Intolerances        ----  REVIEW OF SYSTEMS:  As detailed above in the HPI    ----  PHYSICAL EXAM:  GENERAL: patient appears chronically ill, weak and fatigued  ENMT: oropharynx clear without erythema, dry mucous membranes  LUNGS: Reduced air entry bilaterally, trace expiratory wheezing noted, no accessory muscle use  HEART: S1/S2, regular rate and rhythm, distant heart sounds  GASTROINTESTINAL: abdomen is obese, soft, nontender, nondistended, normoactive bowel sounds, no palpable masses  MUSCULOSKELETAL: no clubbing or cyanosis, no obvious deformity  NEUROLOGIC: awake, alert, readily interactive, good muscle tone in 4 extremities    T(C): 37.1 (06-09-25 @ 14:45), Max: 37.5 (06-08-25 @ 16:00)  HR: 67 (06-09-25 @ 14:45) (49 - 84)  BP: 118/40 (06-09-25 @ 14:45) (77/31 - 154/48)  RR: 19 (06-09-25 @ 14:45) (14 - 25)  SpO2: 100% (06-09-25 @ 14:45) (96% - 100%)  Wt(kg): --    ----  INTAKE & OUTPUT:  I&O's Summary    08 Jun 2025 07:01  -  09 Jun 2025 07:00  --------------------------------------------------------  IN: 1262.4 mL / OUT: 900 mL / NET: 362.4 mL    09 Jun 2025 07:01  -  09 Jun 2025 15:53  --------------------------------------------------------  IN: 377.6 mL / OUT: 300 mL / NET: 77.6 mL        LABS:                        7.8    24.78 )-----------( 190      ( 09 Jun 2025 05:48 )             24.1     06-09    137  |  100  |  62[H]  ----------------------------<  144[H]  3.8   |  27  |  1.40[H]    Ca    8.2[L]      09 Jun 2025 05:48  Phos  4.0     06-09  Mg     1.8     06-09    TPro  5.5[L]  /  Alb  2.8[L]  /  TBili  0.8  /  DBili  x   /  AST  9[L]  /  ALT  32  /  AlkPhos  38[L]  06-09    PT/INR - ( 09 Jun 2025 09:10 )   PT: 19.1 sec;   INR: 1.63 ratio         PTT - ( 08 Jun 2025 19:35 )  PTT:37.9 sec  Urinalysis Basic - ( 09 Jun 2025 05:48 )    Color: x / Appearance: x / SG: x / pH: x  Gluc: 144 mg/dL / Ketone: x  / Bili: x / Urobili: x   Blood: x / Protein: x / Nitrite: x   Leuk Esterase: x / RBC: x / WBC x   Sq Epi: x / Non Sq Epi: x / Bacteria: x      CAPILLARY BLOOD GLUCOSE      POCT Blood Glucose.: 280 mg/dL (09 Jun 2025 11:00)  POCT Blood Glucose.: 146 mg/dL (09 Jun 2025 06:15)  POCT Blood Glucose.: 181 mg/dL (08 Jun 2025 23:35)  POCT Blood Glucose.: 191 mg/dL (08 Jun 2025 17:07)        Culture - Sputum (collected 06-08-25 @ 18:25)  Source: Sputum Sputum  Gram Stain (06-08-25 @ 22:12):    Few Squamous epithelial cells per low power field    Few polymorphonuclear leukocytes per low power field    Few Gram Positive Cocci in Pairs and Chains per oil power field  Preliminary Report (06-09-25 @ 14:32):    Commensal marilee consistent with body site    Urinalysis with Rflx Culture (collected 06-08-25 @ 15:44)    Culture - Blood (collected 06-08-25 @ 11:20)  Source: Blood Blood-Peripheral  Preliminary Report (06-09-25 @ 14:02):    No growth at 24 hours    Culture - Blood (collected 06-08-25 @ 11:15)  Source: Blood Blood-Peripheral  Preliminary Report (06-09-25 @ 14:02):    No growth at 24 hours    ----  DATA REVIEW:  Personally reviewed:  -Vital sign trends: Afebrile, heart rate stable, BP labile (currently on vasopressor gtt), maintaining O2 sats on low-flow nasal cannula  -Laboratory results: Leukocytosis peaked at 42, now improving, hemoglobin remains low but near her chronic baseline per chart review, renal indices improving, stable electrolytes  -Radiology results: Chest x-ray personally reviewed, dense right upper lobe pneumonia, sharp costophrenic angles  -Microbio results: Blood cultures no growth to date     ----  ACTIVE MEDICATIONS:  aMIOdarone    Tablet 100 milliGRAM(s) Oral daily  azithromycin  IVPB      azithromycin  IVPB 500 milliGRAM(s) IV Intermittent every 24 hours  chlorhexidine 2% Cloths 1 Application(s) Topical <User Schedule>  droxidopa 100 milliGRAM(s) Oral every 8 hours  fluticasone propionate/ salmeterol 500-50 MICROgram(s) Diskus 1 Dose(s) Inhalation two times a day  gabapentin 400 milliGRAM(s) Oral every 12 hours  heparin   Injectable 5000 Unit(s) SubCutaneous every 8 hours  hydrocortisone sodium succinate Injectable 50 milliGRAM(s) IV Push every 6 hours  insulin lispro (ADMELOG) corrective regimen sliding scale   SubCutaneous every 6 hours  lactated ringers. 1000 milliLiter(s) IV Continuous <Continuous>  midodrine 10 milliGRAM(s) Oral every 8 hours  montelukast 10 milliGRAM(s) Oral daily  norepinephrine Infusion 0.04 MICROgram(s)/kG/Min IV Continuous <Continuous>  pantoprazole  Injectable 40 milliGRAM(s) IV Push daily  piperacillin/tazobactam IVPB.. 3.375 Gram(s) IV Intermittent every 8 hours  tiotropium 2.5 MICROgram(s) Inhaler 2 Puff(s) Inhalation daily

## 2025-06-09 NOTE — PROGRESS NOTE ADULT - SUBJECTIVE AND OBJECTIVE BOX
CHIEF COMPLAINT/ REASON FOR VISIT  .. Patient was seen to address the  issue listed under PROBLEM LIST which is located toward bottom of this note     [facility] ICU1 02A    Allergies    No Known Allergies    Intolerances        PAST MEDICAL & SURGICAL HISTORY:  COPD (chronic obstructive pulmonary disease)      DM (diabetes mellitus)  diet-controlled      PAF (paroxysmal atrial fibrillation)  s/p ablation      Hiatal hernia      H/O aplastic anemia      History of IBS      H/O osteoporosis      HLD (hyperlipidemia)      PMR (polymyalgia rheumatica)      History of basal cell cancer      Chronic kidney disease (CKD)      Hypotension      Presence of IVC filter      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      History of immunodeficiency      Lumbar compression fracture      H/O hiatal hernia      H/O pulmonary fibrosis      H/O sinus bradycardia      History of fracture of right hip  "unoperable"      S/P hysterectomy      S/P foot surgery      S/P knee surgery      After cataract  bilateral eye cataract surgically removed      Closed right hip fracture  rigth hip ORIF july 19 2016      S/P IVC filter  nov 2016      S/P carpal tunnel release  Right 2008 & 6/27/17      H/O kyphoplasty      Port-A-Cath in place  right chest          FAMILY HISTORY:  Family history of bladder cancer (Mother)    Family history of myocardial infarction (Father)    FH: atrial fibrillation (Father)        Home Medications:  amiodarone 200 mg oral tablet: 0.5 tab(s) orally once a day (08 Jun 2025 16:43)  Bentyl 10 mg oral capsule: 1 cap(s) orally 2 times a day, As Needed (08 Jun 2025 16:43)  Eliquis 2.5 mg oral tablet: 1 tab(s) orally 2 times a day (08 Jun 2025 16:43)  esomeprazole 20 mg oral delayed release capsule: 1 cap(s) orally once a day (08 Jun 2025 16:43)  fluticasone-salmeterol 500 mcg-50 mcg/inh inhalation powder: 1 puff(s) inhaled 2 times a day (08 Jun 2025 16:43)  folic acid 1 mg oral tablet: 1 tab(s) orally once a day (in the morning) (08 Jun 2025 16:43)  gabapentin 400 mg oral capsule: 1 cap(s) orally 2 times a day (08 Jun 2025 16:43)  ipratropium-albuterol 0.5 mg-2.5 mg/3 mL inhalation solution: 3 milliliter(s) inhaled every 6 hours As needed Shortness of Breath and/or Wheezing (08 Jun 2025 16:43)  IVIG monthly:  (08 Jun 2025 16:43)  leflunomide 10 mg oral tablet: 1 tab(s) orally once a day (08 Jun 2025 16:43)  midodrine 10 mg oral tablet: 1 tab(s) orally every 8 hours (08 Jun 2025 16:43)  montelukast 10 mg oral tablet: 1 tab(s) orally once a day (08 Jun 2025 16:43)  pantoprazole 40 mg oral delayed release tablet: 1 tab(s) orally once a day (before a meal) (08 Jun 2025 16:43)  predniSONE 5 mg oral tablet: 1 tab(s) orally once a day (08 Jun 2025 16:43)  Procrit 10,000 units/mL preservative-free injectable solution: injectable once a week WEDNESDAY (08 Jun 2025 16:43)  Reclast Infusion , IV x 1 yearly:  (08 Jun 2025 16:43)  simvastatin 10 mg oral tablet: 1 tab(s) orally once a day (at bedtime) (08 Jun 2025 16:43)  tiotropium 2.5 mcg/inh inhalation aerosol: 2 puff(s) inhaled once a day (08 Jun 2025 16:43)  tiZANidine: 2 tab(s) orally once a day (at bedtime) as needed for  muscle spasm (08 Jun 2025 16:43)  torsemide 20 mg oral tablet: 1 tab(s) orally once a day (in the morning) (08 Jun 2025 16:49)      MEDICATIONS  (STANDING):  aMIOdarone    Tablet 100 milliGRAM(s) Oral daily  azithromycin  IVPB      azithromycin  IVPB 500 milliGRAM(s) IV Intermittent every 24 hours  chlorhexidine 2% Cloths 1 Application(s) Topical <User Schedule>  droxidopa 100 milliGRAM(s) Oral every 8 hours  fluticasone propionate/ salmeterol 500-50 MICROgram(s) Diskus 1 Dose(s) Inhalation two times a day  gabapentin 400 milliGRAM(s) Oral every 12 hours  hydrocortisone sodium succinate Injectable 50 milliGRAM(s) IV Push every 6 hours  insulin lispro (ADMELOG) corrective regimen sliding scale   SubCutaneous every 6 hours  lactated ringers. 1000 milliLiter(s) (50 mL/Hr) IV Continuous <Continuous>  midodrine 10 milliGRAM(s) Oral every 8 hours  montelukast 10 milliGRAM(s) Oral daily  norepinephrine Infusion 0.05 MICROgram(s)/kG/Min (6.25 mL/Hr) IV Continuous <Continuous>  pantoprazole  Injectable 40 milliGRAM(s) IV Push daily  piperacillin/tazobactam IVPB.. 3.375 Gram(s) IV Intermittent every 8 hours  tiotropium 2.5 MICROgram(s) Inhaler 2 Puff(s) Inhalation daily    MEDICATIONS  (PRN):      Diet, Regular:   DASH/TLC Sodium & Cholesterol Restricted (06-08-25 @ 15:56) [Active]          Vital Signs Last 24 Hrs  T(C): 37.3 (09 Jun 2025 08:45), Max: 38.3 (08 Jun 2025 11:30)  T(F): 99.1 (09 Jun 2025 08:45), Max: 101 (08 Jun 2025 11:30)  HR: 72 (09 Jun 2025 08:45) (49 - 84)  BP: 114/51 (09 Jun 2025 08:45) (72/47 - 154/48)  BP(mean): 67 (09 Jun 2025 08:45) (36 - 95)  RR: 23 (09 Jun 2025 08:45) (14 - 23)  SpO2: 98% (09 Jun 2025 08:45) (97% - 100%)    Parameters below as of 08 Jun 2025 15:33  Patient On (Oxygen Delivery Method): nasal cannula          06-08-25 @ 07:01  -  06-09-25 @ 07:00  --------------------------------------------------------  IN: 1262.4 mL / OUT: 900 mL / NET: 362.4 mL              LABS:                        7.8    24.78 )-----------( 190      ( 09 Jun 2025 05:48 )             24.1     06-09    137  |  100  |  62[H]  ----------------------------<  144[H]  3.8   |  27  |  1.40[H]    Ca    8.2[L]      09 Jun 2025 05:48  Phos  4.0     06-09  Mg     1.8     06-09    TPro  5.5[L]  /  Alb  2.8[L]  /  TBili  0.8  /  DBili  x   /  AST  9[L]  /  ALT  32  /  AlkPhos  38[L]  06-09    PT/INR - ( 08 Jun 2025 19:35 )   PT: 26.2 sec;   INR: 2.26 ratio         PTT - ( 08 Jun 2025 19:35 )  PTT:37.9 sec  Urinalysis Basic - ( 09 Jun 2025 05:48 )    Color: x / Appearance: x / SG: x / pH: x  Gluc: 144 mg/dL / Ketone: x  / Bili: x / Urobili: x   Blood: x / Protein: x / Nitrite: x   Leuk Esterase: x / RBC: x / WBC x   Sq Epi: x / Non Sq Epi: x / Bacteria: x            WBC:  WBC Count: 24.78 K/uL (06-09 @ 05:48)  WBC Count: 42.66 K/uL (06-08 @ 19:35)  WBC Count: 32.33 K/uL (06-08 @ 11:20)      MICROBIOLOGY:  RECENT CULTURES:  06-08 Sputum Sputum XXXX   Few Squamous epithelial cells per low power field  Few polymorphonuclear leukocytes per low power field  Few Gram Positive Cocci in Pairs and Chains per oil power field XXXX          CARDIAC MARKERS ( 09 Jun 2025 05:48 )  x     / x     / x     / x     / <1.0 ng/mL  CARDIAC MARKERS ( 08 Jun 2025 19:35 )  x     / x     / x     / x     / <1.0 ng/mL  CARDIAC MARKERS ( 08 Jun 2025 11:20 )  x     / x     / x     / x     / <1.0 ng/mL        PT/INR - ( 08 Jun 2025 19:35 )   PT: 26.2 sec;   INR: 2.26 ratio         PTT - ( 08 Jun 2025 19:35 )  PTT:37.9 sec    Sodium:  Sodium: 137 mmol/L (06-09 @ 05:48)  Sodium: 134 mmol/L (06-08 @ 19:35)  Sodium: 137 mmol/L (06-08 @ 11:20)      1.40 mg/dL 06-09 @ 05:48  2.00 mg/dL 06-08 @ 19:35  1.90 mg/dL 06-08 @ 11:20      Hemoglobin:  Hemoglobin: 7.8 g/dL (06-09 @ 05:48)  Hemoglobin: 8.7 g/dL (06-08 @ 19:35)  Hemoglobin: 7.5 g/dL (06-08 @ 11:20)      Platelets: Platelet Count - Automated: 190 K/uL (06-09 @ 05:48)  Platelet Count - Automated: 200 K/uL (06-08 @ 19:35)  Platelet Count - Automated: 153 K/uL (06-08 @ 11:20)      LIVER FUNCTIONS - ( 09 Jun 2025 05:48 )  Alb: 2.8 g/dL / Pro: 5.5 g/dL / ALK PHOS: 38 U/L / ALT: 32 U/L / AST: 9 U/L / GGT: x             Urinalysis Basic - ( 09 Jun 2025 05:48 )    Color: x / Appearance: x / SG: x / pH: x  Gluc: 144 mg/dL / Ketone: x  / Bili: x / Urobili: x   Blood: x / Protein: x / Nitrite: x   Leuk Esterase: x / RBC: x / WBC x   Sq Epi: x / Non Sq Epi: x / Bacteria: x        RADIOLOGY & ADDITIONAL STUDIES:      MICROBIOLOGY:  RECENT CULTURES:  06-08 Sputum Sputum XXXX   Few Squamous epithelial cells per low power field  Few polymorphonuclear leukocytes per low power field  Few Gram Positive Cocci in Pairs and Chains per oil power field XXXX

## 2025-06-09 NOTE — PROGRESS NOTE ADULT - ASSESSMENT
84F h/o AFib on Eliquis, COPD, CKD, aplastic anemia, polymyalgia rheumatica on chronic steroids, avascular necrosis of hips, HTN, HLD, with recent admission to [facility] 5/26 - 6/5 for acute HFpEF exacerbation and COPD exacerbation, discharged to rehab on 6/5 and returning today with right sided chest pain, general malaise and feeling unwell. She reports some slight cough though otherwise no other new symptoms reported. Found to be hypotensive, febrile w/ leukocytosis. Admitted to ICU for septic shock likely 2/2 to PNA and KIMBERLY.

## 2025-06-09 NOTE — PROGRESS NOTE ADULT - ASSESSMENT
REASON FOR VISIT  .. Management of problems listed below      EVENTS/CURRENT ISSUES.  . 6/8/2025  . PNEUMONIA RML 6/8/2025  . SHOCK 6/8/2025   . NOREPI 6/8/2025   . STRESS STEROIDS   .... HYDROCORTISONE 6/8/2025  . A fib (chronic) POA 6/8/2025  . ANEMIA Hb 6/8/2025 Hb 7.5  . KIMBERLY ON CKD Cr 6/8/2025 Cr 1.9        REVIEW OF SYMPTOMS   Able to give ROS  Yes     RELIABILITY +/-   CONSTITUTIONAL Weakness Yes    ENDOCRINE  No heat or cold intolerance    ALLERGY No hives  Sore throat No stridor  RESP Shortness of breath YES   NEURO New weakness No   CARDIAC   Palpitations No         PHYSICAL EXAM    HEENT Unremarkable  atraumatic   RESP Fair air entry  Harsh breath sound   CARDIAC S1 S2 No S3     NO JVD    ABDOMEN No hepatosplenomegaly   PEDAL EDEMA present No calf tenderness    REASON FOR VISIT  .. Management of problems listed below      CC.   . 6/8/2025 brought in by ems for right sided chest pain that started at 3am- nonradiating- patient was offered aspirin by ems- patient refused and stated to ems that she is on plavix and was advised not to take aspirin. patient on 43 litres nasl cannula-     OVERALL PRESENTATION.  . 6/8/2025 84 yr old female with PMHx afib on eliquis, COPD (not on home O2), PE/Rt lower extremity DVT 2017, Rt LE IVC filter 2017 CKD3, aplastic anemia, polymyalgia rheumatica on prednisone 5 mg po qd, requiring PRBC transfusions ~8 weeks (via Rt subclavian mediport), AVN bilat hips, HTN, HLD, HFpEF (75% 6.2.25), diastolic dysfx grade1, recent hospitalization 5/25/25-6/5/25 for ADHF/COPD exacerbation, Pt with eventual improvement and transfer to [facility] facility from where she presents to E.D. this a.m. 6/8/25 with c/o Rt sided chest pain. general malaise. Pt stated began to feel ill evening before presentation, daughter this a.m. endorsed pt lethargic, pale.     In E.D. Pt found to have fever with tmax of 101, KIMBERLY on CKD with sCr 1.90 (baseline 1.2-1.6), HYPOtnsive with SBP 80's (pt on midodrine therapy, usual 's). In addition found to have leukocytosis of 37.6 (baseline 5.25 6/5/25), procalcitonin of 13.6, Hgb 7.5, elevated INR 2.27,  Chest xray demonstrated RML consolidations, concerning for pneum. In E.D. pt receiving 500 cc's NS, Vanco 1 gm, zosyn. Pt received tylenol 1 gm IV x1.       BEST PRACTICE ISSUES.  . HOB ELEVATN.    .... Yes  . DIET  .   .... DASH 6/8/2025   . FREE WATER.   ....   .  IV FLUID.  ..... 6/8 lr 40 x 12 h   . PHARMAC DVT PPLX .    .... HPSC 6/9/2025   . NON PHARMAC DVT PPLX .      . STRESS ULCR PPLX .   ....   . DATE/DM MGMT.   ..... See under Endocrine section   GENERAL DATA .   . COVID.         .... scv2 6/8/2025 scv2 (-)   . GOC.    ....    . ICU STAY.    .... no   . INFECTION PPLX .   .... CHLORHEXIDINE 2% 6/8/2025   . ALLGY.   ....  nka  . WT.   .... 6/8/2025 69   . BMI.  ....6/8/2025 26      [id number]  VITALS/GAS EXCHANGE/DRIPS    ABGS.     .  VS/ PO/IO/ VENT/ DRIPS.  6/9/2025 99f 64 109/46   6/9/2025 2p nore .03 m/k/m      XXXXXXXX[id number]  PROBLEM ASSESSMENT RECOMMENDATIONS.  RESP.   . GAS EXCHANGE .   .... target PO 90-95%     . COPD   .... ADVAIR 500 6/8/2025   .... MONTELUKAST 10 6/8/2025   .... SPIRIVA 6/8/2025     . RESP FAILURE  .... 6/8/2025 Has ho hypercapnia   .... 6/8/2025 recommend monitor abg      INFECTION.  . DATA  .... pr 6/8/2025 or 13.6   .... esr 6/8/2025 esr 17   .... w 6/8-6/9/2025 w 32 - 24   .... ua 6/8/2025 w 4   .... cxr 6/8/2025 cxr dense infiltra r upper hilum r mediport  .... flu ab 6/8/2025 (-)   .... rsv 6/8/2025 (-)    .... mrsa 6/9/2025 (-)     . PNEUMONIA RUL 6/8/2025   .... AZITHRO 6/8/2025   .... ZOSYN 6/8/2025     CARDIAC.  . STRESS STEROIDS   .... HYDROCORTISONE   ........ 6/8/2025 h 50.4    . SHOCK   .... MIDODRINE 6/8/2025 m 10.3   .... DROXIDOPA 6/9/2025 d 100.3   .... NOREPI 6/8/2025 n 8/250   .... target map 65 (+)     . CAD    .... Trop 6/8-6/9/2025 Tr 32- 24     . LACTICEMIA   .... la 6/8/2025 la 1.4     . CHF  .... pbnp 6/8/2025 pbnp 6599   .... tte 4/2025 mild ph  n vf    . A fib (chronic) POA 6/8/2025  .... AMIODARONE 100 6/8/2025    HEMAT.  . DATA  .... Plt 6/8/2025 plt 153   .... inr 6/8-6/9/2025 inr 2.27- 1.63      . ANEMIA   .... Hb 6/8-6/9/2025 Hb 7.5- 7.8     GI.   . DIET .   .... dash 6/8/2025     . LFT MONITORING   .... LFTS    6/8/2025  ........ AP     39  ........ AST   11  ........ ALT    35    RENAL.  . DATA  .... Na 6/8/2025 Na 137  .... K 6/8/2025 K 4   .... CO2 6/8/2025 co2 29   .... ag 6/8/2025 ag 9  .... alb 6/8/2025 alb 3.4     . KIMBERLY ON CKD   .... Cr 6/8-6/9/2025 Cr 1.9 - 1.4   .... Cr 5/27-5/28-5/29-5/30-6/1/2025   .... Cr 1.2 - 1.3 - 1.3 - 1.6 - 1.6    ENDO.  . DM  .  .... 6/8/2025 SL SCALE     NEURO.  . PAIN  .... GABAPENTIN 6/8/2025 g 400.2       XXXXXXXXXXXXXXXXXXXXXX   SUMMARY BASELINE .     84 yr old female pt of Dr Gallegos not on home ox or home cpap used to use trelegy lives with spouse quit 42 y 1/2 ppd with PMHx afib on eliquis, COPD (not on home O2), PE/Rt lower extremity DVT 2017, Rt LE IVC filter 2017 CKD3, aplastic anemia, polymyalgia rheumatica on prednisone 5 mg po qd, requiring PRBC transfusions around 8 weeks (via Rt subclavian mediport), AVN bilat hips, HTN, HLD, HFpEF (75% 6.2.25), diastolic dysfx grade1, recent hospitalization 5/25/25-6/5/25 for ADHF/COPD exacerbation, Pt with eventual improvement and transfer to [facility] facility   CC.   . 6/8/2025 R CHEST PAIN   MAIN ISSUES.  . PNEUMONIA RML 6/8/2025  . SHOCK 6/8/2025   . NOREPI 6/8/2025   . STRESS STEROIDS   .... HYDROCORTISONE 6/8/2025  . A fib (chronic) POA 6/8/2025  . ANEMIA Hb 6/8/2025 Hb 7.5  . KIMBERLY ON CKD Cr 6/8/2025 Cr 1.9  PMH.   . AFon Eliquis,   . COPD (not on home o2),   . CKD,   . aplastic anemia,   . TRANSFUSION 6/2/2025 1 u prbc   . PMR (chronic prednisone with AVN hip),   . HLD,   . HTN,   . DM    PROCEDURES/DEVICES .  PAST PROCEDURES   . r mediport poa 6/8/2025     DISCUSSIONS.  .... Discussed with primary care and relevant consultants on an ongoing basis       TIME SPENT.  . Over 36 minutes aggregate care time spent on encounter; activities included   direct patient care, counseling and/or coordinating care reviewing notes, lab data/ imaging , discussion with multidisciplinary team/ patient  /family and explaining in detail risks, benefits, alternatives  of the recommendations     ROBERT HENRIQUEZ 84 6/8/2025 1942

## 2025-06-09 NOTE — PROGRESS NOTE ADULT - PROBLEM SELECTOR PLAN 1
-in the ED, tmax of 101 w/ KIMBERLY on CKD with Cr 1.90 (baseline 1.2-1.6), Hypotensive with SBP 80's (pt on midodrine therapy, usual 's). also w/ leukocytosis of 32.33  -procalcitonin of 13.6, RVP neg    -CXR: Dense infiltrate off the right upper hilum is presently seen  -c/w azithromycin and zosyn   -started on levophed wean off pressors as tolerated (pt's baseline usual 's)  -c/w solumedrol 50mg q6   -f/u legionella, strep pneumo, bcx x2, Ucx   -obtain MRSA/MSSA PCR  -trend WBC and fever curve   -Supplemental O2 prn to maintain SPO2 > 92%  -plan per ICU

## 2025-06-09 NOTE — PROGRESS NOTE ADULT - PROBLEM SELECTOR PLAN 5
-most likely pre-renal due to hypotension   -suspect atn due to shock state  -improving indices  -Cr 1.90 (baseline 1.2-1.6)   -f/u AM CMP    -avoid nephrotoxic meds  -nephro following  -plan per ICU

## 2025-06-09 NOTE — PROGRESS NOTE ADULT - PROBLEM SELECTOR PLAN 11
#VTE ppx: hep sq  #GI ppx: iv ppi  #Diet: Diet, Regular: DASH/TLC {Sodium & Cholesterol Restricted} (06-08-25 @ 15:56) [Active]  #Activity: Activity - Increase As Tolerated:   Time/Priority:  Routine (06-08-25 @ 14:16) [Active]  #ACP: full code  #Dispo: further plans pending clinical course

## 2025-06-09 NOTE — PROGRESS NOTE ADULT - ASSESSMENT
Prerenal azotemia, CKD 3  Dyspnea: Pneumonia, h/o COPD/CHF  Shock   Diabetes  Aplastic anemia, requiring frequent blood transfusions    06/08/25: Pressor support as needed. Hold diuretics. Optimize BP. ICU evalauation. Monitor blood sugar levels. Insulin coverage as needed. Check cultures, IV abx.   Monitor h/h trend. Transfuse PRBC's PRN. Avoid nephrotoxic meds as possible. Avoid ACEI, ARB, NSAIDs and IV contrast. Will follow electrolytes and renal function trend.   06/09/25: Improved renal indices. On pressor support. No objection to resume diuretics. Cardiology follow up. D/w pt's daughter at bedside. ICU management.

## 2025-06-10 LAB
A1C WITH ESTIMATED AVERAGE GLUCOSE RESULT: 5.6 % — SIGNIFICANT CHANGE UP (ref 4–5.6)
ALBUMIN SERPL ELPH-MCNC: 2.9 G/DL — LOW (ref 3.3–5)
ALP SERPL-CCNC: 39 U/L — LOW (ref 40–120)
ALT FLD-CCNC: 32 U/L — SIGNIFICANT CHANGE UP (ref 12–78)
ANION GAP SERPL CALC-SCNC: 6 MMOL/L — SIGNIFICANT CHANGE UP (ref 5–17)
APTT BLD: 32.8 SEC — SIGNIFICANT CHANGE UP (ref 26.1–36.8)
AST SERPL-CCNC: 7 U/L — LOW (ref 15–37)
BASE EXCESS BLDA CALC-SCNC: 9.1 MMOL/L — HIGH (ref -2–3)
BASOPHILS # BLD AUTO: 0.01 K/UL — SIGNIFICANT CHANGE UP (ref 0–0.2)
BASOPHILS NFR BLD AUTO: 0.1 % — SIGNIFICANT CHANGE UP (ref 0–2)
BILIRUB SERPL-MCNC: 0.7 MG/DL — SIGNIFICANT CHANGE UP (ref 0.2–1.2)
BLOOD GAS COMMENTS ARTERIAL: SIGNIFICANT CHANGE UP
BUN SERPL-MCNC: 57 MG/DL — HIGH (ref 7–23)
CALCIUM SERPL-MCNC: 8.4 MG/DL — LOW (ref 8.5–10.1)
CHLORIDE SERPL-SCNC: 102 MMOL/L — SIGNIFICANT CHANGE UP (ref 96–108)
CO2 SERPL-SCNC: 31 MMOL/L — SIGNIFICANT CHANGE UP (ref 22–31)
CREAT SERPL-MCNC: 1.3 MG/DL — SIGNIFICANT CHANGE UP (ref 0.5–1.3)
CULTURE RESULTS: ABNORMAL
EGFR: 41 ML/MIN/1.73M2 — LOW
EGFR: 41 ML/MIN/1.73M2 — LOW
EOSINOPHIL # BLD AUTO: 0 K/UL — SIGNIFICANT CHANGE UP (ref 0–0.5)
EOSINOPHIL NFR BLD AUTO: 0 % — SIGNIFICANT CHANGE UP (ref 0–6)
ESTIMATED AVERAGE GLUCOSE: 114 MG/DL — SIGNIFICANT CHANGE UP (ref 68–114)
GLUCOSE SERPL-MCNC: 132 MG/DL — HIGH (ref 70–99)
HCO3 BLDA-SCNC: 33 MMOL/L — HIGH (ref 21–28)
HCT VFR BLD CALC: 22.8 % — LOW (ref 34.5–45)
HCT VFR BLD CALC: 23.2 % — LOW (ref 34.5–45)
HGB BLD-MCNC: 7.4 G/DL — LOW (ref 11.5–15.5)
HGB BLD-MCNC: 7.6 G/DL — LOW (ref 11.5–15.5)
IMM GRANULOCYTES NFR BLD AUTO: 1.8 % — HIGH (ref 0–0.9)
INR BLD: 1.22 RATIO — HIGH (ref 0.85–1.16)
LEGIONELLA AG UR QL: NEGATIVE — SIGNIFICANT CHANGE UP
LYMPHOCYTES # BLD AUTO: 0.27 K/UL — LOW (ref 1–3.3)
LYMPHOCYTES # BLD AUTO: 2.9 % — LOW (ref 13–44)
MAGNESIUM SERPL-MCNC: 2 MG/DL — SIGNIFICANT CHANGE UP (ref 1.6–2.6)
MCHC RBC-ENTMCNC: 30.8 PG — SIGNIFICANT CHANGE UP (ref 27–34)
MCHC RBC-ENTMCNC: 31.1 PG — SIGNIFICANT CHANGE UP (ref 27–34)
MCHC RBC-ENTMCNC: 32.5 G/DL — SIGNIFICANT CHANGE UP (ref 32–36)
MCHC RBC-ENTMCNC: 32.8 G/DL — SIGNIFICANT CHANGE UP (ref 32–36)
MCV RBC AUTO: 93.9 FL — SIGNIFICANT CHANGE UP (ref 80–100)
MCV RBC AUTO: 95.8 FL — SIGNIFICANT CHANGE UP (ref 80–100)
MONOCYTES # BLD AUTO: 1.75 K/UL — HIGH (ref 0–0.9)
MONOCYTES NFR BLD AUTO: 18.9 % — HIGH (ref 2–14)
NEUTROPHILS # BLD AUTO: 7.04 K/UL — SIGNIFICANT CHANGE UP (ref 1.8–7.4)
NEUTROPHILS NFR BLD AUTO: 76.3 % — SIGNIFICANT CHANGE UP (ref 43–77)
NRBC BLD AUTO-RTO: 0 /100 WBCS — SIGNIFICANT CHANGE UP (ref 0–0)
NRBC BLD AUTO-RTO: 0 /100 WBCS — SIGNIFICANT CHANGE UP (ref 0–0)
PCO2 BLDA: 45 MMHG — HIGH (ref 32–35)
PH BLDA: 7.47 — HIGH (ref 7.35–7.45)
PHOSPHATE SERPL-MCNC: 3.8 MG/DL — SIGNIFICANT CHANGE UP (ref 2.5–4.5)
PLATELET # BLD AUTO: 213 K/UL — SIGNIFICANT CHANGE UP (ref 150–400)
PLATELET # BLD AUTO: 260 K/UL — SIGNIFICANT CHANGE UP (ref 150–400)
PO2 BLDA: 127 MMHG — HIGH (ref 83–108)
POTASSIUM SERPL-MCNC: 3.1 MMOL/L — LOW (ref 3.5–5.3)
POTASSIUM SERPL-SCNC: 3.1 MMOL/L — LOW (ref 3.5–5.3)
PROT SERPL-MCNC: 5.7 G/DL — LOW (ref 6–8.3)
PROTHROM AB SERPL-ACNC: 14.2 SEC — HIGH (ref 9.9–13.4)
RBC # BLD: 2.38 M/UL — LOW (ref 3.8–5.2)
RBC # BLD: 2.47 M/UL — LOW (ref 3.8–5.2)
RBC # FLD: 23.6 % — HIGH (ref 10.3–14.5)
RBC # FLD: 24.5 % — HIGH (ref 10.3–14.5)
S PNEUM AG UR QL: NEGATIVE — SIGNIFICANT CHANGE UP
SAO2 % BLDA: 99.1 % — HIGH (ref 94–98)
SODIUM SERPL-SCNC: 139 MMOL/L — SIGNIFICANT CHANGE UP (ref 135–145)
SPECIMEN SOURCE: SIGNIFICANT CHANGE UP
WBC # BLD: 9.24 K/UL — SIGNIFICANT CHANGE UP (ref 3.8–10.5)
WBC # BLD: 9.87 K/UL — SIGNIFICANT CHANGE UP (ref 3.8–10.5)
WBC # FLD AUTO: 9.24 K/UL — SIGNIFICANT CHANGE UP (ref 3.8–10.5)
WBC # FLD AUTO: 9.87 K/UL — SIGNIFICANT CHANGE UP (ref 3.8–10.5)

## 2025-06-10 PROCEDURE — 71250 CT THORAX DX C-: CPT | Mod: 26

## 2025-06-10 PROCEDURE — 93970 EXTREMITY STUDY: CPT | Mod: 26

## 2025-06-10 PROCEDURE — 99222 1ST HOSP IP/OBS MODERATE 55: CPT

## 2025-06-10 PROCEDURE — 99233 SBSQ HOSP IP/OBS HIGH 50: CPT

## 2025-06-10 PROCEDURE — G0545: CPT

## 2025-06-10 PROCEDURE — 71045 X-RAY EXAM CHEST 1 VIEW: CPT | Mod: 26

## 2025-06-10 PROCEDURE — 78582 LUNG VENTILAT&PERFUS IMAGING: CPT | Mod: 26

## 2025-06-10 PROCEDURE — 99232 SBSQ HOSP IP/OBS MODERATE 35: CPT

## 2025-06-10 RX ORDER — HEPARIN SODIUM 1000 [USP'U]/ML
5500 INJECTION INTRAVENOUS; SUBCUTANEOUS EVERY 6 HOURS
Refills: 0 | Status: DISCONTINUED | OUTPATIENT
Start: 2025-06-10 | End: 2025-06-10

## 2025-06-10 RX ORDER — LIDOCAINE HYDROCHLORIDE 20 MG/ML
1 JELLY TOPICAL ONCE
Refills: 0 | Status: COMPLETED | OUTPATIENT
Start: 2025-06-10 | End: 2025-06-10

## 2025-06-10 RX ORDER — HYDROCORTISONE 20 MG
50 TABLET ORAL EVERY 12 HOURS
Refills: 0 | Status: DISCONTINUED | OUTPATIENT
Start: 2025-06-10 | End: 2025-06-12

## 2025-06-10 RX ORDER — HEPARIN SODIUM 1000 [USP'U]/ML
5000 INJECTION INTRAVENOUS; SUBCUTANEOUS EVERY 12 HOURS
Refills: 0 | Status: DISCONTINUED | OUTPATIENT
Start: 2025-06-10 | End: 2025-06-12

## 2025-06-10 RX ORDER — IPRATROPIUM BROMIDE AND ALBUTEROL SULFATE .5; 2.5 MG/3ML; MG/3ML
3 SOLUTION RESPIRATORY (INHALATION) EVERY 6 HOURS
Refills: 0 | Status: DISCONTINUED | OUTPATIENT
Start: 2025-06-10 | End: 2025-06-16

## 2025-06-10 RX ORDER — HEPARIN SODIUM 1000 [USP'U]/ML
INJECTION INTRAVENOUS; SUBCUTANEOUS
Qty: 25000 | Refills: 0 | Status: DISCONTINUED | OUTPATIENT
Start: 2025-06-10 | End: 2025-06-10

## 2025-06-10 RX ORDER — HEPARIN SODIUM 1000 [USP'U]/ML
2500 INJECTION INTRAVENOUS; SUBCUTANEOUS EVERY 6 HOURS
Refills: 0 | Status: DISCONTINUED | OUTPATIENT
Start: 2025-06-10 | End: 2025-06-10

## 2025-06-10 RX ADMIN — Medication 1 DOSE(S): at 05:02

## 2025-06-10 RX ADMIN — HEPARIN SODIUM 5000 UNIT(S): 1000 INJECTION INTRAVENOUS; SUBCUTANEOUS at 13:07

## 2025-06-10 RX ADMIN — DROXIDOPA 100 MILLIGRAM(S): 300 CAPSULE ORAL at 05:03

## 2025-06-10 RX ADMIN — Medication 50 MILLIGRAM(S): at 13:07

## 2025-06-10 RX ADMIN — INSULIN LISPRO 1: 100 INJECTION, SOLUTION INTRAVENOUS; SUBCUTANEOUS at 08:38

## 2025-06-10 RX ADMIN — HEPARIN SODIUM 1200 UNIT(S)/HR: 1000 INJECTION INTRAVENOUS; SUBCUTANEOUS at 21:28

## 2025-06-10 RX ADMIN — LIDOCAINE HYDROCHLORIDE 1 PATCH: 20 JELLY TOPICAL at 05:38

## 2025-06-10 RX ADMIN — Medication 1 APPLICATION(S): at 05:03

## 2025-06-10 RX ADMIN — IPRATROPIUM BROMIDE AND ALBUTEROL SULFATE 3 MILLILITER(S): .5; 2.5 SOLUTION RESPIRATORY (INHALATION) at 13:29

## 2025-06-10 RX ADMIN — Medication 50 MILLIGRAM(S): at 20:22

## 2025-06-10 RX ADMIN — Medication 40 MILLIEQUIVALENT(S): at 13:08

## 2025-06-10 RX ADMIN — DROXIDOPA 100 MILLIGRAM(S): 300 CAPSULE ORAL at 13:08

## 2025-06-10 RX ADMIN — INSULIN LISPRO 2: 100 INJECTION, SOLUTION INTRAVENOUS; SUBCUTANEOUS at 12:42

## 2025-06-10 RX ADMIN — Medication 40 MILLIEQUIVALENT(S): at 20:23

## 2025-06-10 RX ADMIN — GABAPENTIN 400 MILLIGRAM(S): 400 CAPSULE ORAL at 05:03

## 2025-06-10 RX ADMIN — Medication 25 GRAM(S): at 21:27

## 2025-06-10 RX ADMIN — Medication 40 MILLIGRAM(S): at 13:07

## 2025-06-10 RX ADMIN — LIDOCAINE HYDROCHLORIDE 1 PATCH: 20 JELLY TOPICAL at 07:00

## 2025-06-10 RX ADMIN — Medication 25 GRAM(S): at 09:23

## 2025-06-10 RX ADMIN — AMIODARONE HYDROCHLORIDE 100 MILLIGRAM(S): 50 INJECTION, SOLUTION INTRAVENOUS at 05:03

## 2025-06-10 RX ADMIN — Medication 1 DOSE(S): at 20:22

## 2025-06-10 RX ADMIN — MIDODRINE HYDROCHLORIDE 10 MILLIGRAM(S): 5 TABLET ORAL at 13:08

## 2025-06-10 RX ADMIN — TIOTROPIUM BROMIDE INHALATION SPRAY 2 PUFF(S): 3.12 SPRAY, METERED RESPIRATORY (INHALATION) at 05:02

## 2025-06-10 RX ADMIN — DROXIDOPA 100 MILLIGRAM(S): 300 CAPSULE ORAL at 21:41

## 2025-06-10 RX ADMIN — Medication 250 MILLIGRAM(S): at 13:07

## 2025-06-10 RX ADMIN — LIDOCAINE HYDROCHLORIDE 1 PATCH: 20 JELLY TOPICAL at 17:00

## 2025-06-10 RX ADMIN — HEPARIN SODIUM 5000 UNIT(S): 1000 INJECTION INTRAVENOUS; SUBCUTANEOUS at 05:02

## 2025-06-10 RX ADMIN — GABAPENTIN 400 MILLIGRAM(S): 400 CAPSULE ORAL at 20:23

## 2025-06-10 RX ADMIN — Medication 50 MILLIGRAM(S): at 05:02

## 2025-06-10 RX ADMIN — MONTELUKAST SODIUM 10 MILLIGRAM(S): 10 TABLET ORAL at 13:08

## 2025-06-10 NOTE — PROGRESS NOTE ADULT - PROBLEM SELECTOR PLAN 2
Visita de control para niños de 14 a 15 meses: Instrucciones de cuidado  Child's Well Visit, 14 to 15 Months: Care Instructions  Instrucciones de cuidado     Wilson hijo está explorando wilson silvia y podría experimentar muchos sentimientos. Cuando los padres responden a las necesidades emocionales en mehdi Joya Hinders y consecuente, erich hijos desarrollan confianza y se sienten más seguros. A los 14 o 15 meses, es posible que wilson hijo pueda decir unas pocas palabras, entender instrucciones simples, y hacerle saber lo que quiere halando o [place], o por medio de gruñidos. Wilson hijo podría beber de mehdi taza y señalar partes de wilson cuerpo. Es posible que pueda caminar seda y subir escaleras. La atención de seguimiento es mehdi parte clave del tratamiento y la seguridad de wilson hijo. Asegúrese de hacer y acudir a todas las citas, y llame a wilson médico si wilson hijo está teniendo problemas. También es mehdi buena idea saber los resultados de los exámenes de wilson hijo y mantener mehdi lista de los medicamentos que lyle. ¿Cómo puede cuidar de wilson hijo en el hogar? Seguridad    · Asegúrese de que wilson hijo no se pueda quemar. Mantenga las ollas, rizadores, planchas y tazas de café calientes fuera de wilson alcance. Ponga protectores de plástico en todos los enchufes. Instale detectores de humo y revise las baterías con regularidad.     · En cada viaje que betty en automóvil, asegure a wilson hijo en un asiento de seguridad que haya sido correctamente instalado y que cumpla con todas las normas de seguridad actuales. Para preguntas sobre asientos de seguridad, llame a la \"National Highway Traffic Safety Administration\" al [phone].     · Vigile a wilson hijo en todo momento cuando esté cerca del agua, incluidas piscinas (albercas), jacuzzis, bañeras, baldes (cubetas) e inodoros.     · Mantenga los productos de limpieza y los medicamentos en gabinetes bajo llave fuera del alcance de los niños.  Tenga el número de teléfono del [facility] de Control de Toxicología (\"Poison Control\" al [phone]) cerca del teléfono.     · Hable con wilson médico si wilson hijo pasa mucho tiempo en mehdi casa construida antes de 1978. La pintura podría contener planne, que puede ser Iesha Diallo    · Sea paciente y [facility] no diga \"no\" todo el tiempo ni tenga demasiadas reglas. Eso solo confunde a wilson hijo.     · Enseñe a wilson hijo a pedir las cosas con palabras.     · Cristino un buen ejemplo. No se enfade ni grite frente a wilson hijo.     · Si wilson hijo está exigiendo demasiado, intente cambiar wilson atención a otra cosa. O usted puede ir a otra habitación para que wilson hijo tenga espacio para calmarse.     · Si wilson hijo no quiere hacer algo, no se enoje. Los niños dicen \"no\" con frecuencia a esta edad. Si wilson hijo no quiere hacer algo que en realidad debe hacer, alicia ir a la guardería, levántelo con suavidad y llévelo a la guardería.     · Sea ivan, comprensivo y consistente para ayudar a wilson hijo en esta fase de wilson desarrollo. Alimentación    · Ofrézcale mehdi variedad de alimentos saludables todos los días, alicia frutas, verduras seda cocidas, cereal bajo en azúcar, yogur, panes y galletas integrales, romeo magras, pescado y tofu. Los niños necesitan comer por los menos cada 3 o 4 horas.     · No le dé a wilson hijo alimentos con los que se pueda atragantar, alicia nueces, uvas enteras, caramelos duros o pegajosos, o palomitas de maíz.     · Cristino a wilson hijo refrigerios saludables. Aunque no le gusten al principio, continúe intentándolo. Compre alimentos para el refrigerio a base de tomi, maíz (elote), arroz, shruthi u otros granos, alicia pan, cereales, tortillas, fideos, galletas y molletes (\"muffins\"). Vacunación    · Asegúrese de que wilson bebé reciba todas las vacunas infantiles recomendadas. [place] a mantener a wilson bebé saludable y prevendrán la propagación de enfermedades. ¿Cuándo debe pedir ayuda?   Preste especial atención a los [employer] yordy de wilson hijo y asegúrese de comunicarse con wilson médico si:    · Le preocupa que wilson hijo no esté creciendo o desarrollándose de manera normal.     · Está preocupado acerca del comportamiento de wilson hijo.     · Necesita más información acerca de cómo cuidar a wilson hijo, o tiene preguntas o inquietudes. ¿Dónde puede encontrar más información en inglés? Vaya a http://www.gray.com/  Fatuma S050 en la búsqueda para aprender más acerca de \"Visita de control para niños de 14 a 15 meses: Instrucciones de cuidado. \"  Revisado: 20 septiembre, 2021               Versión del contenido: 13.2  © 2006-2022 Healthwise, Incorporated. Las instrucciones de cuidado fueron adaptadas bajo licencia por [facility] (which disclaims liability or warranty for this information). Si usted tiene [place] afección médica o sobre estas instrucciones, siempre pregunte a wilson profesional de yordy. Healthwise, Incorporated niega toda garantía o responsabilidad por wilson uso de esta información. Suspect secondary to shock,   -continue midodrine 10 mg every 8 hours  -serial enzymes reviewed  -TTE performed 6/2 showing normal LV function with EF 71% and LV hypertrophy, normal RV size and function, moderate LA dilation   -no urgent need for TTE, will consider repeat  -cardio following

## 2025-06-10 NOTE — PROGRESS NOTE ADULT - PROBLEM SELECTOR PLAN 9
on home prednisone 5mg daily  and leflunomide (10mg) if restarted Patient will need to bring in home medication to bring to pharmacy to dispense.  -now on IV solumedrol 50mg q6

## 2025-06-10 NOTE — PHYSICAL THERAPY INITIAL EVALUATION ADULT - GAIT TRAINING, PT EVAL
Patient will improve sit <-> stand with sup 3-5 sessions in order for patient to safely get in and out of chair

## 2025-06-10 NOTE — CHART NOTE - NSCHARTNOTEFT_GEN_A_CORE
Called by RN, patient complaining of chest pain, similar to prior; VS stable.    Patient evaluated at bedside. Patient reports right-sided chest pain, pointing to right ribcage area, rates pain at 7/10. Patient reports pain is similar to prior but was a lot more severe earlier during this admission. Endorses shortness of breath. Patient denies any substernal or left chest pain, abd pain, nausea, vomiting, pain radiation down either arm. Pain reproducible to palpation. Patient states this pain does not feel like her prior PE's. Satting 96% on 1.5L NC at bedside. Patient with Hx of COPD.     Vital Signs Last 24 Hrs  T(C): 36.8 (09 Jun 2025 21:50), Max: 37.4 (09 Jun 2025 09:30)  T(F): 98.3 (09 Jun 2025 21:50), Max: 99.3 (09 Jun 2025 09:30)  HR: 58 (09 Jun 2025 21:50) (54 - 87)  BP: 112/68 (09 Jun 2025 21:50) (77/31 - 134/45)  BP(mean): 60 (09 Jun 2025 21:00) (36 - 81)  RR: 18 (09 Jun 2025 21:50) (14 - 26)  SpO2: 96% (09 Jun 2025 21:50) (92% - 100%)    Parameters below as of 09 Jun 2025 21:50  Patient On (Oxygen Delivery Method): nasal cannula  O2 Flow (L/min): 2      Physical Exam:  GENERAL: appears in mild discomfort   LUNGS: no use of accessory muscles of respiration, + bilateral wheezes noted, no crackles   HEART: S1/S2, regular rate, regular rhythm, no LE edema, no calf tenderness  GASTROINTESTINAL: abdomen is soft, nontender, nondistended, + BS  MUSCULOSKELETAL: + Right chest reproducible TTP  NEUROLOGIC: answers questions and follows commands appropriately      Plan:   - Patient with 7/10 right-sided chest pain, similar to prior   - Pain reproducible on palpation; patient with wheezing on exam  - Satting 96% on 1.5L NC  - Give Advair and Spiriva now   - Give Lidocaine patch x1 now  - Rest of management per Primary Team  - RN to notify with any changes.

## 2025-06-10 NOTE — PROGRESS NOTE ADULT - ASSESSMENT
84F h/o AFib on Eliquis, COPD, CKD, aplastic anemia, polymyalgia rheumatica on chronic steroids, avascular necrosis of hips, HTN, HLD, with recent admission to [facility] 5/26 - 6/5 for acute HFpEF exacerbation and COPD exacerbation, discharged to rehab on 6/5 and returning today with right sided chest pain, general malaise and feeling unwell. She reports some slight cough though otherwise no other new symptoms reported. Found to be hypotensive, febrile w/ leukocytosis. Admitted to ICU for septic shock likely 2/2 to PNA and KIMBERLY.

## 2025-06-10 NOTE — PROGRESS NOTE ADULT - PROBLEM SELECTOR PLAN 6
Not on home medications  -advance diet as tolerated   -c/w LDISS   -monitor BG closely while on steroids   -Continue home gabapentin.

## 2025-06-10 NOTE — PROGRESS NOTE ADULT - SUBJECTIVE AND OBJECTIVE BOX
[INTERVAL HX: ]  Patient seen and examined;  Chart reviewed and events noted;     seen this afternoon  still with SOB, but better  no nausea no vomitting    [MEDICATIONS]  MEDICATIONS  (STANDING):  albuterol/ipratropium for Nebulization 3 milliLiter(s) Nebulizer every 6 hours  aMIOdarone    Tablet 100 milliGRAM(s) Oral daily  azithromycin  IVPB      azithromycin  IVPB 500 milliGRAM(s) IV Intermittent every 24 hours  chlorhexidine 2% Cloths 1 Application(s) Topical <User Schedule>  dextrose 5%. 1000 milliLiter(s) (50 mL/Hr) IV Continuous <Continuous>  dextrose 5%. 1000 milliLiter(s) (100 mL/Hr) IV Continuous <Continuous>  dextrose 50% Injectable 25 Gram(s) IV Push once  dextrose 50% Injectable 12.5 Gram(s) IV Push once  dextrose 50% Injectable 25 Gram(s) IV Push once  droxidopa 100 milliGRAM(s) Oral every 8 hours  fluticasone propionate/ salmeterol 500-50 MICROgram(s) Diskus 1 Dose(s) Inhalation two times a day  gabapentin 400 milliGRAM(s) Oral every 12 hours  glucagon  Injectable 1 milliGRAM(s) IntraMuscular once  heparin  Infusion.  Unit(s)/Hr (12 mL/Hr) IV Continuous <Continuous>  hydrocortisone sodium succinate Injectable 50 milliGRAM(s) IV Push every 12 hours  insulin lispro (ADMELOG) corrective regimen sliding scale   SubCutaneous three times a day before meals  insulin lispro (ADMELOG) corrective regimen sliding scale   SubCutaneous at bedtime  midodrine 10 milliGRAM(s) Oral every 8 hours  montelukast 10 milliGRAM(s) Oral daily  pantoprazole  Injectable 40 milliGRAM(s) IV Push daily  piperacillin/tazobactam IVPB.. 3.375 Gram(s) IV Intermittent every 8 hours  potassium chloride    Tablet ER 40 milliEquivalent(s) Oral every 4 hours  potassium chloride   Powder 40 milliEquivalent(s) Oral every 4 hours  tiotropium 2.5 MICROgram(s) Inhaler 2 Puff(s) Inhalation daily    MEDICATIONS  (PRN):  dextrose Oral Gel 15 Gram(s) Oral once PRN Blood Glucose LESS THAN 70 milliGRAM(s)/deciliter  heparin   Injectable 5500 Unit(s) IV Push every 6 hours PRN For aPTT less than 40  heparin   Injectable 2500 Unit(s) IV Push every 6 hours PRN For aPTT between 40 - 57      [VITALS]  Vital Signs Last 24 Hrs  T(C): 36.7 (10 Nick 2025 12:00), Max: 37.2 (2025 19:00)  T(F): 98.1 (10 Nick 2025 12:00), Max: 99 (2025 19:00)  HR: 78 (10 Nick 2025 12:00) (53 - 80)  BP: 100/60 (10 Nick 2025 12:00) (100/60 - 114/65)  BP(mean): 60 (2025 21:00) (56 - 65)  RR: 17 (10 Nick 2025 12:00) (16 - 22)  SpO2: 97% (10 Nick 2025 12:) (96% - 100%)    Parameters below as of 10 Nick 2025 12:00  Patient On (Oxygen Delivery Method): nasal cannula  O2 Flow (L/min): 2    [WT/HT]  Daily     Daily Weight in k.4 (2025 21:50)  [VENT]      [PHYSICAL EXAM]  GEN: NAD  HEENT: normocephalic and atraumatic. EOMI. PERRL.    NECK: Supple.  No lymphadenopathy   LUNGS: Clear to auscultation. Dec BS BL bases  HEART: Regular rate and rhythm,  no MRG  ABDOMEN: Soft, nontender, and nondistended.  Positive bowel sounds.    : No CVA tenderness  EXTREMITIES: Without edema.  NEUROLOGIC: grossly intact.  PSYCHIATRIC: Appropriate affect .  SKIN: No rash     [LABS:]                        7.6    9.24  )-----------( 213      ( 10 Nick 2025 06:40 )             23.2     06-10    139  |  102  |  57[H]  ----------------------------<  132[H]  3.1[L]   |  31  |  1.30    Ca    8.4[L]      10 Nick 2025 06:40  Phos  3.8     06-10  Mg     2.0     06-10    TPro  5.7[L]  /  Alb  2.9[L]  /  TBili  0.7  /  DBili  x   /  AST  7[L]  /  ALT  32  /  AlkPhos  39[L]  06-10    PT/INR - ( 10 Nick 2025 06:40 )   PT: 14.2 sec;   INR: 1.22 ratio         PTT - ( 10 Nick 2025 06:40 )  PTT:32.8 sec    Sedimentation Rate, Erythrocyte: 17 mm/hr [0 - 20] (25 @ 11:20)  Folate, Serum: >20.0 ng/mL (25 @ 09:20)  Vitamin B12, Serum: 1444 pg/mL *H* [232 - 1245] (25 @ 09:20)  Iron - Total Binding Capacity.: Unable to calculate Test Repeated ug/dL [220 - 430] (23 @ 14:00)  Ferritin: 1342 ng/mL *H* [13 - 330] (23 @ 14:00)    Reticulocyte Count (23 @ 14:00)  Reticulocyte Percent: 2.0 % [0.5 - 2.5]  Absolute Reticulocytes: 39.0 K/uL [25.0 - 125.0]    Vitamin B12, Serum: 663 pg/mL [232 - 1245] (23 @ 14:00)  Folate, Serum: 16.2 ng/mL (23 @ 14:00)  Serum Protein Electrophoresis Interp: Normal Electrophoresis Pattern (23 @ 14:00)    Immunofixation, Serum: No Monoclonal Band Identified  Reference Range: None Detected (23 @ 14:00)      Urinalysis Basic - ( 10 Nick 2025 06:40 )  Color: x / Appearance: x / SG: x / pH: x  Gluc: 132 mg/dL / Ketone: x  / Bili: x / Urobili: x   Blood: x / Protein: x / Nitrite: x   Leuk Esterase: x / RBC: x / WBC x   Sq Epi: x / Non Sq Epi: x / Bacteria: x        Culture - Sputum (collected 2025 18:25)  Source: Sputum Sputum  Gram Stain (2025 22:12):    Few Squamous epithelial cells per low power field    Few polymorphonuclear leukocytes per low power field    Few Gram Positive Cocci in Pairs and Chains per oil power field  Final Report (10 Nick 2025 15:22):    Commensal ginger consistent with body site    Urinalysis with Rflx Culture (collected 2025 15:44)    Culture - Urine (collected 2025 15:44)  Source: Clean Catch  Final Report (2025 16:05):    <10,000 CFU/mL Normal Urogenital Ginger    Culture - Blood (collected 2025 11:20)  Source: Blood Blood-Peripheral  Preliminary Report (10 Nick 2025 14:01):    No growth at 48 Hours    Culture - Blood (collected 2025 11:15)  Source: Blood Blood-Peripheral  Preliminary Report (10 Nick 2025 14:01):    No growth at 48 Hours      SARS-CoV-2: NotDetec (26 May 2025 18:00)    ABG - ( 10 Nick 2025 17:30 )  pH, Arterial: 7.47  pH, Blood: x     /  pCO2: 45    /  pO2: 127   / HCO3: 33    / Base Excess: 9.1   /  SaO2: 99.1                Culture - Sputum (collected 2025 18:25)  Source: Sputum Sputum  Gram Stain (2025 22:12):    Few Squamous epithelial cells per low power field    Few polymorphonuclear leukocytes per low power field    Few Gram Positive Cocci in Pairs and Chains per oil power field  Final Report (10 Nick 2025 15:22):    Commensal ginger consistent with body site    Urinalysis with Rflx Culture (collected 2025 15:44)    Culture - Urine (collected 2025 15:44)  Source: Clean Catch  Final Report (2025 16:05):    <10,000 CFU/mL Normal Urogenital Ginger    Culture - Blood (collected 2025 11:20)  Source: Blood Blood-Peripheral  Preliminary Report (10 Nick 2025 14:01):    No growth at 48 Hours    Culture - Blood (collected 2025 11:15)  Source: Blood Blood-Peripheral  Preliminary Report (10 Nick 2025 14:01):    No growth at 48 Hours        [RADIOLOGY STUDIES:]

## 2025-06-10 NOTE — PROGRESS NOTE ADULT - ASSESSMENT
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   transfer to medical unit 06/10/25    BL LE dec BS  dyspnea    RECOMMENDATIONS    CHF and COPD  mgmt per medicine, pulm and Cardiology  on prednisone 40mg last admission  Currently on Hydrocortisone  on nebs, montelukast, fluticasone    Hypotension  BP mgmt per MICU    Pneumonia  on Zosyn and azithromycin  mgmt per ID    Anemia  re-check FOBT,     if positive, re-assess risk benefits AC  Consider txfuse 1U  PRBC  typically get symptomatic when Hgb <8    Resume procrit if no contraindication    Cont PPI with protonix 40mg    Afib, CHF  on Amiodarone  off Eliquis 2.5,   on , Midodrine  on Heparin ggt    hx of IVC filter    Supportive measures otherwise   IMPRESSION  J18.8:      Other pneumonia, unspecified organism  A41.8:     Other specified sepsis  D46.7:    Other myelodysplastic syndromes  J44.0:      Chronic obstructive pulmonary disease with acute lower respiratory infection  I50        congestive heart failure  D63.8   Anemia chronic disease  D63.1   Anemia CKD  N18.31  CKD3a    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   transfer to medical unit 06/10/25    BL LE dec BS  dyspnea    RECOMMENDATIONS    CHF and COPD  mgmt per medicine, pulm and Cardiology  on prednisone 40mg last admission  Currently on Hydrocortisone  on nebs, montelukast, fluticasone    Hypotension  BP mgmt per MICU    Pneumonia  on Zosyn and azithromycin  mgmt per ID    Anemia  re-check FOBT,     if positive, re-assess risk benefits AC  Consider txfuse 1U  PRBC  typically get symptomatic when Hgb <8    HOLD transfusion pending CT scan and if any evidence for volume overload.     Resume procrit if no contraindication    Cont PPI with protonix 40mg    Afib, CHF  on Amiodarone  off Eliquis 2.5,   on , Midodrine  on Heparin ggt    hx of IVC filter    Supportive measures otherwise

## 2025-06-10 NOTE — PROGRESS NOTE ADULT - ASSESSMENT
REASON FOR VISIT  .. Management of problems listed below      EVENTS/CURRENT ISSUES.  . 6/10/2025 Mild hemoptysis   . 6/9 tr out of icu  . 6/8/2025  . PNEUMONIA RML 6/8/2025  . SHOCK 6/8/2025   . NOREPI 6/8/2025   . STRESS STEROIDS   .... HYDROCORTISONE 6/8/2025  . A fib (chronic) POA 6/8/2025  . ANEMIA Hb 6/8/2025 Hb 7.5  . KIMBERLY ON CKD Cr 6/8/2025 Cr 1.9        REVIEW OF SYMPTOMS   Able to give ROS  Yes     RELIABILITY +/-   CONSTITUTIONAL Weakness Yes    ENDOCRINE  No heat or cold intolerance    ALLERGY No hives  Sore throat No stridor  RESP Shortness of breath YES   NEURO New weakness No   CARDIAC   Palpitations No         PHYSICAL EXAM    HEENT Unremarkable  atraumatic   RESP Fair air entry  Harsh breath sound   CARDIAC S1 S2 No S3     NO JVD    ABDOMEN No hepatosplenomegaly   PEDAL EDEMA present No calf tenderness    REASON FOR VISIT  .. Management of problems listed below      CC.   . 6/8/2025 brought in by ems for right sided chest pain that started at 3am- nonradiating- patient was offered aspirin by ems- patient refused and stated to ems that she is on plavix and was advised not to take aspirin. patient on 43 litres nasl cannula-     OVERALL PRESENTATION.  . 6/8/2025 84 yr old female with PMHx afib on eliquis, COPD (not on home O2), PE/Rt lower extremity DVT 2017, Rt LE IVC filter 2017 CKD3, aplastic anemia, polymyalgia rheumatica on prednisone 5 mg po qd, requiring PRBC transfusions ~8 weeks (via Rt subclavian mediport), AVN bilat hips, HTN, HLD, HFpEF (75% 6.2.25), diastolic dysfx grade1, recent hospitalization 5/25/25-6/5/25 for ADHF/COPD exacerbation, Pt with eventual improvement and transfer to [facility] facility from where she presents to E.D. this a.m. 6/8/25 with c/o Rt sided chest pain. general malaise. Pt stated began to feel ill evening before presentation, daughter this a.m. endorsed pt lethargic, pale.     In E.D. Pt found to have fever with tmax of 101, KIMBERLY on CKD with sCr 1.90 (baseline 1.2-1.6), HYPOtnsive with SBP 80's (pt on midodrine therapy, usual 's). In addition found to have leukocytosis of 37.6 (baseline 5.25 6/5/25), procalcitonin of 13.6, Hgb 7.5, elevated INR 2.27,  Chest xray demonstrated RML consolidations, concerning for pneum. In E.D. pt receiving 500 cc's NS, Vanco 1 gm, zosyn. Pt received tylenol 1 gm IV x1.     PMH.      BEST PRACTICE ISSUES.  . HOB ELEVATN.    .... Yes  . DIET  .   .... DASH 6/8/2025   . FREE WATER.   ....   .  IV FLUID.  ..... 6/8 lr 40 x 12 h   . PHARMAC DVT PPLX .    .... 6/10/2025 4:46 PM iv ufh   .... HPSC 6/9-6/10/2025   . NON PHARMAC DVT PPLX .      . STRESS ULCR PPLX .   ....   . DATE/DM MGMT.   ..... See under Endocrine section   GENERAL DATA .   . COVID.         .... scv2 6/8/2025 scv2 (-)   . GOC.    ....    . ICU STAY.    .... no   . INFECTION PPLX .   .... CHLORHEXIDINE 2% 6/8/2025   . ALLGY.   ....  nka  . WT.   .... 6/8/2025 69   . BMI.  ....6/8/2025 26      XXXXXXXXXXXXXXXXXXXXXXX  VITALS/GAS EXCHANGE/DRIPS    ABGS.     .  VS/ PO/IO/ VENT/ DRIPS.  6/10/2025 afeb 58 112/68   6/10/2025 2l 97%    XXXXXXXX[id number]  PROBLEM ASSESSMENT RECOMMENDATIONS.  RESP.   . GAS EXCHANGE .   .... target PO 90-95%     . COPD   .... ADVAIR 500 6/8/2025   .... MONTELUKAST 10 6/8/2025   .... SPIRIVA 6/8/2025     . RESP FAILURE  .... 6/8/2025 Has ho hypercapnia   .... 6/8/2025 recommend monitor abg    . MILD HEMOPTYSIS 6/10/2025   .... 6/10/2025 check ct   .... 6/10/2025  dw hemat cardio id and instead of restarting apixa will start iv UFH which can be easily reversed in case Hb starts dropping but will be the rx for venous thromboembolism as well as for a fib       INFECTION.  . DATA  .... pr 6/8-6/10/2025 or 13.6 - 9.2   .... esr 6/8/2025 esr 17   .... w 6/8-6/9/2025 w 32 - 24   .... ua 6/8/2025 w 4   .... cxr 6/8/2025 cxr dense infiltra r upper hilum r mediport  .... flu ab 6/8/2025 (-)   .... rsv 6/8/2025 (-)    .... mrsa 6/9/2025 (-)     . PNEUMONIA RUL 6/8/2025   .... AZITHRO 6/8/2025   .... ZOSYN 6/8/2025     CARDIAC.  . PAIN CHEST   .... 6/10/2025 co pain chest r   .... 6/10/2025 check ct to see if she has pleurisy   .... 6/10/2025  dw hemat cardio id and instead of restarting apixa will start iv UFH which can be easily reversed in case Hb starts dropping but will be the rx for venous thromboembolism as well as for a fib     . STRESS STEROIDS   .... HYDROCORTISONE   ........ 6/8/2025 h 50.4  ........ 6/10/2025 h 50.2     . SHOCK   .... MIDODRINE 6/8/2025 m 10.3   .... DROXIDOPA 6/9/2025 d 100.3   .... NOREPI 6/8/2025 n 8/250   .... target map 65 (+)     . CAD    .... Trop 6/8-6/9/2025 Tr 32- 24     . LACTICEMIA   .... la 6/8/2025 la 1.4     . CHF  .... pbnp 6/8/2025 pbnp 6599   .... tte 4/2025 mild ph  n vf  .... tte 6/2/2025   ........ ef 71%   ........ n rvsf    ........ pasp 54   ....... la mod dilatd     . A fib (chronic) POA 6/8/2025  .... AMIODARONE 100 6/8/2025  .... 6/10/2025  dw hemat cardio id and instead of restarting apixa will start iv UFH which can be easily reversed in case Hb starts dropping but will be the rx for venous thromboembolism as well as for a fib     HEMAT.  . DATA  .... Plt 6/8/2025 plt 153   .... inr 6/8-6/9/2025 inr 2.27- 1.63      . ANEMIA   .... Hb 6/8-6/9-6/10/2025 Hb 7.5- 7.8 - 7.6    . TRANSFUSION  .... 6/8  1 u prbc      GI.   . DIET .   .... dash 6/8/2025     . LFT MONITORING   .... LFTS    6/8/2025  ........ AP     39  ........ AST   11  ........ ALT    35    RENAL.  . DATA  .... Na 6/8/2025 Na 137  .... K 6/8/2025 K 4   .... CO2 6/8/2025 co2 29   .... ag 6/8/2025 ag 9  .... alb 6/8/2025 alb 3.4     . KIMBERLY ON CKD   .... Cr 6/8-6/9-6/10/2025 Cr 1.9 - 1.4 - 1.3   .... Cr 5/27-5/28-5/29-5/30-6/1/2025   .... Cr 1.2 - 1.3 - 1.3 - 1.6 - 1.6    ENDO.  . DM  .  .... 6/8/2025 SL SCALE     NEURO.  . PAIN  .... GABAPENTIN 6/8/2025 g 400.2       XXXXXXXXXXXXXXXXXXXXXX   SUMMARY BASELINE .     84 yr old female pt of Dr Gallegos not on home ox or home cpap used to use trelegy lives with spouse quit 40 y 1/2 ppd with PMHx afib on eliquis, COPD (not on home O2), PE/Rt lower extremity DVT 2017, Rt LE IVC filter 2017 CKD3, aplastic anemia, polymyalgia rheumatica on prednisone 5 mg po qd, requiring PRBC transfusions around 8 weeks (via Rt subclavian mediport), AVN bilat hips, HTN, HLD, HFpEF (75% 6.2.25), diastolic dysfx grade1, recent hospitalization 5/25/25-6/5/25 for ADHF/COPD exacerbation, Pt with eventual improvement and transfer to [facility] facility   CC.   . 6/8/2025 R CHEST PAIN   MAIN ISSUES.  . COPD   . MILD HEMOPTYSOIS 6/10/2025   . PNEUMONIA RML 6/8/2025  . PLEURITIS CHEST PAIN 6/10/2025  . SHOCK 6/8/2025   . NOREPI 6/8/2025   . STRESS STEROIDS   .... HYDROCORTISONE 6/8/2025  . A fib (chronic) POA 6/8/2025  . ANEMIA Hb 6/8/2025 Hb 7.5  . TRANSFUSION  .... 6/8  1 u prbc    . KIMBERLY ON CKD Cr 6/8/2025 Cr 1.9  PMH.   . AFon Eliquis,   . COPD (not on home o2),   . CKD,   . aplastic anemia,   . TRANSFUSION 6/2/2025 1 u prbc   . PMR (chronic prednisone with AVN hip),   . HLD,   . HTN,   . DM    PROCEDURES/DEVICES .  PAST PROCEDURES   . r mediport poa 6/8/2025     DISCUSSIONS.  .... Discussed with primary care and relevant consultants on an ongoing basis       TIME SPENT.  . Over 36 minutes aggregate care time spent on encounter; activities included   direct patient care, counseling and/or coordinating care reviewing notes, lab data/ imaging , discussion with multidisciplinary team/ patient  /family and explaining in detail risks, benefits, alternatives  of the recommendations     ROBERT HENRIQUEZ 84 6/8/2025 1942

## 2025-06-10 NOTE — PROGRESS NOTE ADULT - SUBJECTIVE AND OBJECTIVE BOX
[facility] -- Sai Valle Pannella, Patel, Savella Goodger, Cohen  Office # [phone]      Follow Up:  Septic shock, PNA     Subjective/Observations:    No complaints of chest pain, dyspnea, or palpitations reported. No signs of orthopnea or PND.  overnight with chest pain per notes     REVIEW OF SYSTEMS: All other review of systems is negative unless indicated above    PAST MEDICAL & SURGICAL HISTORY:  COPD (chronic obstructive pulmonary disease)      DM (diabetes mellitus)  diet-controlled      PAF (paroxysmal atrial fibrillation)  s/p ablation      Hiatal hernia      H/O aplastic anemia      History of IBS      H/O osteoporosis      HLD (hyperlipidemia)      PMR (polymyalgia rheumatica)      History of basal cell cancer      Chronic kidney disease (CKD)      Hypotension      Presence of IVC filter      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      History of immunodeficiency      Lumbar compression fracture      H/O hiatal hernia      H/O pulmonary fibrosis      H/O sinus bradycardia      History of fracture of right hip  "unoperable"      S/P hysterectomy      S/P foot surgery      S/P knee surgery      After cataract  bilateral eye cataract surgically removed      Closed right hip fracture  rigth hip ORIF 2016      S/P IVC filter  2016      S/P carpal tunnel release  Right  & 17      H/O kyphoplasty      Port-A-Cath in place  right chest          MEDICATIONS  (STANDING):  aMIOdarone    Tablet 100 milliGRAM(s) Oral daily  azithromycin  IVPB      azithromycin  IVPB 500 milliGRAM(s) IV Intermittent every 24 hours  chlorhexidine 2% Cloths 1 Application(s) Topical <User Schedule>  dextrose 5%. 1000 milliLiter(s) (100 mL/Hr) IV Continuous <Continuous>  dextrose 5%. 1000 milliLiter(s) (50 mL/Hr) IV Continuous <Continuous>  dextrose 50% Injectable 25 Gram(s) IV Push once  dextrose 50% Injectable 12.5 Gram(s) IV Push once  dextrose 50% Injectable 25 Gram(s) IV Push once  droxidopa 100 milliGRAM(s) Oral every 8 hours  fluticasone propionate/ salmeterol 500-50 MICROgram(s) Diskus 1 Dose(s) Inhalation two times a day  gabapentin 400 milliGRAM(s) Oral every 12 hours  glucagon  Injectable 1 milliGRAM(s) IntraMuscular once  heparin   Injectable 5000 Unit(s) SubCutaneous every 8 hours  hydrocortisone sodium succinate Injectable 50 milliGRAM(s) IV Push every 6 hours  insulin lispro (ADMELOG) corrective regimen sliding scale   SubCutaneous three times a day before meals  insulin lispro (ADMELOG) corrective regimen sliding scale   SubCutaneous at bedtime  midodrine 10 milliGRAM(s) Oral every 8 hours  montelukast 10 milliGRAM(s) Oral daily  pantoprazole  Injectable 40 milliGRAM(s) IV Push daily  piperacillin/tazobactam IVPB.. 3.375 Gram(s) IV Intermittent every 8 hours  tiotropium 2.5 MICROgram(s) Inhaler 2 Puff(s) Inhalation daily    MEDICATIONS  (PRN):  dextrose Oral Gel 15 Gram(s) Oral once PRN Blood Glucose LESS THAN 70 milliGRAM(s)/deciliter      Allergies    No Known Allergies    Intolerances        Vital Signs Last 24 Hrs  T(C): 36.8 (10 Nick 2025 05:07), Max: 37.4 (2025 09:30)  T(F): 98.3 (10 Nick 2025 05:07), Max: 99.3 (2025 09:30)  HR: 53 (10 Nick 2025 05:07) (53 - 87)  BP: 114/65 (10 Nick 2025 05:07) (103/39 - 131/37)  BP(mean): 60 (2025 21:00) (55 - 81)  RR: 17 (10 Nick 2025 05:07) (14 - 26)  SpO2: 97% (10 Nick 2025 05:07) (92% - 100%)    Parameters below as of 10 Nick 2025 05:07  Patient On (Oxygen Delivery Method): nasal cannula  O2 Flow (L/min): 2      I&O's Summary    2025 07:01  -  10 Nick 2025 07:00  --------------------------------------------------------  IN: 717.6 mL / OUT: 400 mL / NET: 317.6 mL          PHYSICAL EXAM:  TELE:   Constitutional: NAD, awake and alert, well-developed  HEENT: Moist Mucous Membranes, Anicteric  Pulmonary: Non-labored, breath sounds are clear bilaterally, No wheezing, crackles or rhonchi  Cardiovascular: Regular, S1 and S2 nl, No murmurs, rubs, gallops or clicks  Gastrointestinal: Bowel Sounds present, soft, nontender.   Lymph: No lymphadenopathy. No peripheral edema.  Skin: No visible rashes or ulcers.  Psych:  Mood & affect appropriate    LABS: All Labs Reviewed:                        7.8    24.78 )-----------( 190      ( 2025 05:48 )             24.1                         8.7    42.66 )-----------( 200      ( 2025 19:35 )             27.3                         7.5    32.33 )-----------( 153      ( 2025 11:20 )             23.2     2025 05:48    137    |  100    |  62     ----------------------------<  144    3.8     |  27     |  1.40   2025 19:35    134    |  96     |  70     ----------------------------<  280    4.1     |  27     |  2.00   2025 11:20    137    |  99     |  72     ----------------------------<  97     4.0     |  29     |  1.90     Ca    8.2        2025 05:48  Ca    8.0        2025 19:35  Ca    8.6        2025 11:20  Phos  4.0       2025 05:48  Phos  4.4       2025 19:35  Mg     1.8       2025 05:48  Mg     2.2       2025 19:35  Mg     1.4       2025 11:20    TPro  5.5    /  Alb  2.8    /  TBili  0.8    /  DBili  x      /  AST  9      /  ALT  32     /  AlkPhos  38     2025 05:48  TPro  5.9    /  Alb  3.1    /  TBili  2.0    /  DBili  x      /  AST  18     /  ALT  39     /  AlkPhos  46     2025 19:35  TPro  6.3    /  Alb  3.4    /  TBili  1.1    /  DBili  x      /  AST  11     /  ALT  35     /  AlkPhos  39     2025 11:20    PT/INR - ( 2025 09:10 )   PT: 19.1 sec;   INR: 1.63 ratio         PTT - ( 2025 19:35 )  PTT:37.9 sec  CARDIAC MARKERS ( 2025 05:48 )  x     / x     / x     / x     / <1.0 ng/mL  CARDIAC MARKERS ( 2025 19:35 )  x     / x     / x     / x     / <1.0 ng/mL  CARDIAC MARKERS ( 2025 11:20 )  x     / x     / x     / x     / <1.0 ng/mL         EC Lead ECG:   Ventricular Rate 80 BPM    Atrial Rate 80 BPM    P-R Interval 132 ms    QRS Duration 86 ms    Q-T Interval 398 ms    QTC Calculation(Bazett) 459 ms    P Axis 75 degrees    R Axis -35 degrees    T Axis 78 degrees    Diagnosis Line Sinus rhythm with premature supraventricular complexes  Left axis deviation  Possible Lateral infarct , age undetermined  Abnormal ECG  When compared with ECG of 2025 22:27,  premature ventricular complexes are no longer present  Confirmed by ROSALVA GOODRICH ([id number]) on 2025 9:50:39 PM (25 @ 10:58)      TRANSTHORACIC ECHOCARDIOGRAM REPORT  ________________________________________________________________________________                                      _______       Pt. Name:       MARTINEZ JONES Study Date:    2025  MRN:            [id number]        YOB: 1940  Accession #:    [id number]       Age:           84 years  Account#:       [id number]      Gender:        F  Heart Rate:                     Height:        62.99 in (160.00 cm)  Rhythm:                         Weight:        154.32 lb (70.00 kg)  Blood Pressure: 91/55 mmHg      BSA/BMI:       1.73 m² / 27.34 kg/m²  ________________________________________________________________________________________  Referring Physician:    [id number] James Osei  Interpreting Physician: Daniel Jorge M.D.  Primary Sonographer:    Kelsey Monson RDSTAR    CPT:               ECHO TTE WO CON COMP W DOPP - 90669.m  Indication(s):     Cardiomyopathy, unspecified - I42.9  Procedure:         Transthoracic echocardiogram with 2-D, M-mode and complete                     spectral and color flow Doppler.  Ordering Location: [facility]  Admission Status:  Inpatient    _______________________________________________________________________________________     CONCLUSIONS:      1. Leftventricular systolic function is normal with an ejection fraction of 71 % by Arias's method of disks.   2. Normal right ventricular cavity size, with normal wall thickness, and normal right ventricular systolic function.   3. Left atrium is moderately dilated.   4. Mild mitral regurgitation.   5. Estimated pulmonary artery systolic pressure is 54 mmHg.   6. Mild pulmonic regurgitation.   7. Mild tricuspid regurgitation.   8. Small bilateral pleural effusion noted.   9. There is increased LV mass and concentric hypertrophy.    ________________________________________________________________________________________  FINDINGS:     Left Ventricle:  Left ventricular systolic function is normal with a calculated ejection fraction of 71 % by the Arias's biplane method of disks. Moderate left ventricular hypertrophy. There is increased LV mass and concentric hypertrophy.     Right Ventricle:  The right ventricular cavity is normal in size, with normal wall thickness and right ventricular systolic function is normal.     Left Atrium:  The left atrium is moderately dilated with an indexed volume of 44.98 ml/m².     Right Atrium:  The right atrium is normal in size with an indexed volume of 21.88 ml/m² and an indexed area of 8.72 cm²/m².     Aortic Valve:  The aortic valve is tricuspid with normal leaflet excursion. There is mild thickening of the aortic valve leaflets.     Mitral Valve:  Structurally normal mitral valve with normal leaflet excursion. There is calcification of the mitralvalve annulus. There is mild leaflet calcification. There is mild mitral regurgitation.     Tricuspid Valve:  The tricuspid valve is structurally normal with normal leaflet excursion. There is mild tricuspid regurgitation. Estimated pulmonary artery systolic pressure is 54 mmHg.     Pulmonic Valve:  Structurally normal pulmonic valve with normal leaflet excursion. There is mild pulmonic regurgitation.     Aorta:  The aortic root at the sinuses of Valsalva is normal in size, measuring 3.00 cm (indexed 1.73 cm/m²). The aortic arch diameter is normal in size, measuring 2.4 cm (indexed 1.39 cm/m²).     Pericardium:  There is a trace pericardial effusion.     Pleura:  Small bilateral pleural effusion noted.     Systemic Veins:  The inferior vena cava is normal in size measuring 1.77 cm in diameter, (normal <2.1cm) with abnormal inspiratory collapse (abnormal <50%) consistent with mildly elevated right atrial pressure (~8, range 5-10mmHg).  ____________________________________________________________________  QUANTITATIVE DATA:  Left Ventricle Measurements: (Indexed to BSA)     IVSd (2D):   1.3 cm  LVPWd (2D):  1.2 cm  LVIDd (2D):  4.5 cm  LVIDs (2D):  2.7 cm  LV Mass:     199 g  114.9 g/m²  LV Vol d, MOD A2C: 66.0 ml 38.11 ml/m²  LV Vol d,MOD A4C: 61.7 ml 35.63 ml/m²  LV Vol d, MOD BP:  63.9 ml 36.89 ml/m²  LV Vol s, MOD A2C: 19.4 ml 11.20 ml/m²  LV Vol s, MOD A4C: 16.4 ml 9.47 ml/m²  LV Vol s, MOD BP:  18.2 ml 10.52 ml/m²  LVOT SV MOD BP:    45.7 ml  LV EF% MOD BP:     71 %     MV E Vmax:    1.23 m/s  MV A Vmax:    0.68 m/s  MV E/A:       1.82  e' lateral:   7.51 cm/s  e' medial:    3.05 cm/s  E/e' lateral: 16.38  E/e' medial:  40.33  E/e' Average: 23.30  MV DT:        207 msec    Aorta Measurements: (Normal range) (Indexed to BSA)     Ao Root d 3.00 cm (2.7 - 3.3 cm) 1.73 cm/m²  Ao Arch:  2.4 cm            Left Atrium Measurements: (Indexed to BSA)  LA Diam 2D: 4.10 cm         Right Atrial Measurements:     RA Vol s, MOD A4C         37.9 ml  RA Vol s, MOD A4C i BSA   21.88 ml/m²  RA Area s, MOD A4C        15.1 cm²  RA Area s, MOD A4C, i BSA 8.72 cm²/m²    Mitral Valve Measurements:     MV E Vmax: 1.2 m/s         MR Vmax:          4.19 m/s  MV A Vmax: 0.7 m/s         MR Peak Gradient: 70.2 mmHg  MV E/A:    1.8       Tricuspid Valve Measurements:     TR Vmax:          3.4 m/s  TR Peak Gradient: 45.7 mmHg  RA Pressure:      8 mmHg  PASP:             54 mmHg       [facility] -- Sai Valle Pannella, Patel, Savella Goodger, Cohen  Office # [phone]      Follow Up:  Septic shock, PNA     Subjective/Observations:    No complaints of chest pain, dyspnea, or palpitations reported. No signs of orthopnea or PND.  overnight with chest pain per notes   intermittent right sided chest pain which is tender on exam     REVIEW OF SYSTEMS: All other review of systems is negative unless indicated above    PAST MEDICAL & SURGICAL HISTORY:  COPD (chronic obstructive pulmonary disease)      DM (diabetes mellitus)  diet-controlled      PAF (paroxysmal atrial fibrillation)  s/p ablation      Hiatal hernia      H/O aplastic anemia      History of IBS      H/O osteoporosis      HLD (hyperlipidemia)      PMR (polymyalgia rheumatica)      History of basal cell cancer      Chronic kidney disease (CKD)      Hypotension      Presence of IVC filter      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      History of immunodeficiency      Lumbar compression fracture      H/O hiatal hernia      H/O pulmonary fibrosis      H/O sinus bradycardia      History of fracture of right hip  "unoperable"      S/P hysterectomy      S/P foot surgery      S/P knee surgery      After cataract  bilateral eye cataract surgically removed      Closed right hip fracture  rigth hip ORIF 2016      S/P IVC filter  2016      S/P carpal tunnel release  Right  & 17      H/O kyphoplasty      Port-A-Cath in place  right chest          MEDICATIONS  (STANDING):  aMIOdarone    Tablet 100 milliGRAM(s) Oral daily  azithromycin  IVPB      azithromycin  IVPB 500 milliGRAM(s) IV Intermittent every 24 hours  chlorhexidine 2% Cloths 1 Application(s) Topical <User Schedule>  dextrose 5%. 1000 milliLiter(s) (100 mL/Hr) IV Continuous <Continuous>  dextrose 5%. 1000 milliLiter(s) (50 mL/Hr) IV Continuous <Continuous>  dextrose 50% Injectable 25 Gram(s) IV Push once  dextrose 50% Injectable 12.5 Gram(s) IV Push once  dextrose 50% Injectable 25 Gram(s) IV Push once  droxidopa 100 milliGRAM(s) Oral every 8 hours  fluticasone propionate/ salmeterol 500-50 MICROgram(s) Diskus 1 Dose(s) Inhalation two times a day  gabapentin 400 milliGRAM(s) Oral every 12 hours  glucagon  Injectable 1 milliGRAM(s) IntraMuscular once  heparin   Injectable 5000 Unit(s) SubCutaneous every 8 hours  hydrocortisone sodium succinate Injectable 50 milliGRAM(s) IV Push every 6 hours  insulin lispro (ADMELOG) corrective regimen sliding scale   SubCutaneous three times a day before meals  insulin lispro (ADMELOG) corrective regimen sliding scale   SubCutaneous at bedtime  midodrine 10 milliGRAM(s) Oral every 8 hours  montelukast 10 milliGRAM(s) Oral daily  pantoprazole  Injectable 40 milliGRAM(s) IV Push daily  piperacillin/tazobactam IVPB.. 3.375 Gram(s) IV Intermittent every 8 hours  tiotropium 2.5 MICROgram(s) Inhaler 2 Puff(s) Inhalation daily    MEDICATIONS  (PRN):  dextrose Oral Gel 15 Gram(s) Oral once PRN Blood Glucose LESS THAN 70 milliGRAM(s)/deciliter      Allergies    No Known Allergies    Intolerances        Vital Signs Last 24 Hrs  T(C): 36.8 (10 Nick 2025 05:07), Max: 37.4 (2025 09:30)  T(F): 98.3 (10 Nick 2025 05:07), Max: 99.3 (2025 09:30)  HR: 53 (10 Nick 2025 05:07) (53 - 87)  BP: 114/65 (10 Nick 2025 05:07) (103/39 - 131/37)  BP(mean): 60 (2025 21:00) (55 - 81)  RR: 17 (10 Nick 2025 05:07) (14 - 26)  SpO2: 97% (10 Nick 2025 05:07) (92% - 100%)    Parameters below as of 10 Nick 2025 05:07  Patient On (Oxygen Delivery Method): nasal cannula  O2 Flow (L/min): 2      I&O's Summary    2025 07:01  -  10 Nick 2025 07:00  --------------------------------------------------------  IN: 717.6 mL / OUT: 400 mL / NET: 317.6 mL          PHYSICAL EXAM:  Constitutional: NAD, awake and alert, well-developed  HEENT: Moist Mucous Membranes, Anicteric  Pulmonary: Non-labored, breath sounds are clear bilaterally, No wheezing, crackles or rhonchi  Cardiovascular: Regular, S1 and S2 nl, No murmurs, rubs, gallops or clicks  Gastrointestinal: Bowel Sounds present, soft, nontender.   Lymph: No lymphadenopathy. No peripheral edema.  Skin: No visible rashes or ulcers.  Psych:  Mood & affect appropriate    LABS: All Labs Reviewed:                        7.8    24.78 )-----------( 190      ( 2025 05:48 )             24.1                         8.7    42.66 )-----------( 200      ( 2025 19:35 )             27.3                         7.5    32.33 )-----------( 153      ( 2025 11:20 )             23.2     2025 05:48    137    |  100    |  62     ----------------------------<  144    3.8     |  27     |  1.40   2025 19:35    134    |  96     |  70     ----------------------------<  280    4.1     |  27     |  2.00   2025 11:20    137    |  99     |  72     ----------------------------<  97     4.0     |  29     |  1.90     Ca    8.2        2025 05:48  Ca    8.0        2025 19:35  Ca    8.6        2025 11:20  Phos  4.0       2025 05:48  Phos  4.4       2025 19:35  Mg     1.8       2025 05:48  Mg     2.2       2025 19:35  Mg     1.4       2025 11:20    TPro  5.5    /  Alb  2.8    /  TBili  0.8    /  DBili  x      /  AST  9      /  ALT  32     /  AlkPhos  38     2025 05:48  TPro  5.9    /  Alb  3.1    /  TBili  2.0    /  DBili  x      /  AST  18     /  ALT  39     /  AlkPhos  46     2025 19:35  TPro  6.3    /  Alb  3.4    /  TBili  1.1    /  DBili  x      /  AST  11     /  ALT  35     /  AlkPhos  39     2025 11:20    PT/INR - ( 2025 09:10 )   PT: 19.1 sec;   INR: 1.63 ratio         PTT - ( 2025 19:35 )  PTT:37.9 sec  CARDIAC MARKERS ( 2025 05:48 )  x     / x     / x     / x     / <1.0 ng/mL  CARDIAC MARKERS ( 2025 19:35 )  x     / x     / x     / x     / <1.0 ng/mL  CARDIAC MARKERS ( 2025 11:20 )  x     / x     / x     / x     / <1.0 ng/mL         EC Lead ECG:   Ventricular Rate 80 BPM    Atrial Rate 80 BPM    P-R Interval 132 ms    QRS Duration 86 ms    Q-T Interval 398 ms    QTC Calculation(Bazett) 459 ms    P Axis 75 degrees    R Axis -35 degrees    T Axis 78 degrees    Diagnosis Line Sinus rhythm with premature supraventricular complexes  Left axis deviation  Possible Lateral infarct , age undetermined  Abnormal ECG  When compared with ECG of 2025 22:27,  premature ventricular complexes are no longer present  Confirmed by ROSALVA GOODRICH ([id number]) on 2025 9:50:39 PM (25 @ 10:58)      TRANSTHORACIC ECHOCARDIOGRAM REPORT  ________________________________________________________________________________                                      _______       Pt. Name:       MARTINEZ JONES Study Date:    2025  MRN:            [id number]        YOB: 1940  Accession #:    [id number]       Age:           84 years  Account#:       [id number]      Gender:        F  Heart Rate:                     Height:        62.99 in (160.00 cm)  Rhythm:                         Weight:        154.32 lb (70.00 kg)  Blood Pressure: 91/55 mmHg      BSA/BMI:       1.73 m² / 27.34 kg/m²  ________________________________________________________________________________________  Referring Physician:    [id number] James Osei  Interpreting Physician: Daniel Jorge M.D.  Primary Sonographer:    Kelsey Monson CS    CPT:               ECHO TTE WO CON COMP W DOPP - 23423.m  Indication(s):     Cardiomyopathy, unspecified - I42.9  Procedure:         Transthoracic echocardiogram with 2-D, M-mode and complete                     spectral and color flow Doppler.  Ordering Location: [facility]  Admission Status:  Inpatient    _______________________________________________________________________________________     CONCLUSIONS:      1. Leftventricular systolic function is normal with an ejection fraction of 71 % by Arias's method of disks.   2. Normal right ventricular cavity size, with normal wall thickness, and normal right ventricular systolic function.   3. Left atrium is moderately dilated.   4. Mild mitral regurgitation.   5. Estimated pulmonary artery systolic pressure is 54 mmHg.   6. Mild pulmonic regurgitation.   7. Mild tricuspid regurgitation.   8. Small bilateral pleural effusion noted.   9. There is increased LV mass and concentric hypertrophy.    ________________________________________________________________________________________  FINDINGS:     Left Ventricle:  Left ventricular systolic function is normal with a calculated ejection fraction of 71 % by the Arias's biplane method of disks. Moderate left ventricular hypertrophy. There is increased LV mass and concentric hypertrophy.     Right Ventricle:  The right ventricular cavity is normal in size, with normal wall thickness and right ventricular systolic function is normal.     Left Atrium:  The left atrium is moderately dilated with an indexed volume of 44.98 ml/m².     Right Atrium:  The right atrium is normal in size with an indexed volume of 21.88 ml/m² and an indexed area of 8.72 cm²/m².     Aortic Valve:  The aortic valve is tricuspid with normal leaflet excursion. There is mild thickening of the aortic valve leaflets.     Mitral Valve:  Structurally normal mitral valve with normal leaflet excursion. There is calcification of the mitralvalve annulus. There is mild leaflet calcification. There is mild mitral regurgitation.     Tricuspid Valve:  The tricuspid valve is structurally normal with normal leaflet excursion. There is mild tricuspid regurgitation. Estimated pulmonary artery systolic pressure is 54 mmHg.     Pulmonic Valve:  Structurally normal pulmonic valve with normal leaflet excursion. There is mild pulmonic regurgitation.     Aorta:  The aortic root at the sinuses of Valsalva is normal in size, measuring 3.00 cm (indexed 1.73 cm/m²). The aortic arch diameter is normal in size, measuring 2.4 cm (indexed 1.39 cm/m²).     Pericardium:  There is a trace pericardial effusion.     Pleura:  Small bilateral pleural effusion noted.     Systemic Veins:  The inferior vena cava is normal in size measuring 1.77 cm in diameter, (normal <2.1cm) with abnormal inspiratory collapse (abnormal <50%) consistent with mildly elevated right atrial pressure (~8, range 5-10mmHg).  ____________________________________________________________________  QUANTITATIVE DATA:  Left Ventricle Measurements: (Indexed to BSA)     IVSd (2D):   1.3 cm  LVPWd (2D):  1.2 cm  LVIDd (2D):  4.5 cm  LVIDs (2D):  2.7 cm  LV Mass:     199 g  114.9 g/m²  LV Vol d, MOD A2C: 66.0 ml 38.11 ml/m²  LV Vol d,MOD A4C: 61.7 ml 35.63 ml/m²  LV Vol d, MOD BP:  63.9 ml 36.89 ml/m²  LV Vol s, MOD A2C: 19.4 ml 11.20 ml/m²  LV Vol s, MOD A4C: 16.4 ml 9.47 ml/m²  LV Vol s, MOD BP:  18.2 ml 10.52 ml/m²  LVOT SV MOD BP:    45.7 ml  LV EF% MOD BP:     71 %     MV E Vmax:    1.23 m/s  MV A Vmax:    0.68 m/s  MV E/A:       1.82  e' lateral:   7.51 cm/s  e' medial:    3.05 cm/s  E/e' lateral: 16.38  E/e' medial:  40.33  E/e' Average: 23.30  MV DT:        207 msec    Aorta Measurements: (Normal range) (Indexed to BSA)     Ao Root d 3.00 cm (2.7 - 3.3 cm) 1.73 cm/m²  Ao Arch:  2.4 cm            Left Atrium Measurements: (Indexed to BSA)  LA Diam 2D: 4.10 cm         Right Atrial Measurements:     RA Vol s, MOD A4C         37.9 ml  RA Vol s, MOD A4C i BSA   21.88 ml/m²  RA Area s, MOD A4C        15.1 cm²  RA Area s, MOD A4C, i BSA 8.72 cm²/m²    Mitral Valve Measurements:     MV E Vmax: 1.2 m/s         MR Vmax:          4.19 m/s  MV A Vmax: 0.7 m/s         MR Peak Gradient: 70.2 mmHg  MV E/A:    1.8       Tricuspid Valve Measurements:     TR Vmax:          3.4 m/s  TR Peak Gradient: 45.7 mmHg  RA Pressure:      8 mmHg  PASP:             54 mmHg

## 2025-06-10 NOTE — PROGRESS NOTE ADULT - SUBJECTIVE AND OBJECTIVE BOX
Name: MARTINEZ JONES  MRN: [id number]  LOCATION: [facility] [address]    ----  Patient is a 84y old  Female who presents with a chief complaint of septic shock, PNA (10 Nick 2025 13:31)      FROM ADMISSION H+P:   HPI:  84 yr old female with PMHx afib on eliquis, COPD (not on home O2), PE/Rt lower extremity DVT 2017, Rt LE IVC filter 2017 CKD3, aplastic anemia, polymyalgia rheumatica on prednisone 5 mg po qd, requiring PRBC transfusions ~8 weeks (via Rt subclavian mediport), AVN bilat hips, HTN, HLD, HFpEF (75% 6.2.25), diastolic dysfx grade1, recent hospitalization 5/25/25-6/5/25 for AHDF/COPD exacerbation, pt also with hx of IVC filter, had Rt subclavian mediport.      ----  INTERVAL HPI/OVERNIGHT EVENTS: Pt seen and evaluated at the bedside. No acute overnight events occurred. The patient notes a persistent cough. Cough has been productive of a mucoid blood-tinged sputum. The patient reported chest pain overnight. She describes the pain as feeling achy and dull. Located in the right anterolateral inferior rib cage. Pain is reproducible to palpation. Pain is also worse with coughing. At rest the pain improves almost to 0 out of 10. She was wheezing throughout the day intermittently yesterday but today she reports that the wheezing has resolved. Shortness of breath has also improved. She has no fever or chills. She has no lightheadedness or dizziness. She has no nausea or vomiting and is tolerating diet well. She reports trace peripheral edema located in bilateral ankles.    ----  PAST MEDICAL & SURGICAL HISTORY:  COPD (chronic obstructive pulmonary disease)      DM (diabetes mellitus)  diet-controlled      PAF (paroxysmal atrial fibrillation)  s/p ablation      Hiatal hernia      H/O aplastic anemia      History of IBS      H/O osteoporosis      HLD (hyperlipidemia)      PMR (polymyalgia rheumatica)      History of basal cell cancer      Chronic kidney disease (CKD)      Hypotension      Presence of IVC filter      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      History of immunodeficiency      Lumbar compression fracture      H/O hiatal hernia      H/O pulmonary fibrosis      H/O sinus bradycardia      History of fracture of right hip  "unoperable"      S/P hysterectomy      S/P foot surgery      S/P knee surgery      After cataract  bilateral eye cataract surgically removed      Closed right hip fracture  rigth hip ORIF july 19 2016      S/P IVC filter  nov 2016      S/P carpal tunnel release  Right 2008 & 6/27/17      H/O kyphoplasty      Port-A-Cath in place  right chest          FAMILY HISTORY:  Family history of bladder cancer (Mother)    Family history of myocardial infarction (Father)    FH: atrial fibrillation (Father)        Allergies    No Known Allergies    Intolerances        ----  REVIEW OF SYSTEMS:  As detailed above in the HPI    ----  PHYSICAL EXAM:  GENERAL: patient appears chronically ill, weak and fatigued  ENMT: oropharynx clear without erythema, dry mucous membranes, ecchymosis noted on the right cheek  LUNGS: Reduced air entry bilaterally, wheezing has resolved, no accessory muscle use  HEART: S1/S2, regular rate and rhythm, distant heart sounds, trace/soft pitting edema in bilateral ankles  GASTROINTESTINAL: abdomen is obese, soft, nontender, nondistended, normoactive bowel sounds, no palpable masses  MUSCULOSKELETAL: no clubbing or cyanosis, no obvious deformity  NEUROLOGIC: awake, alert, readily interactive, good muscle tone in 4 extremities    T(C): 36.7 (06-10-25 @ 12:00), Max: 37.2 (06-09-25 @ 19:00)  HR: 78 (06-10-25 @ 12:00) (53 - 87)  BP: 100/60 (06-10-25 @ 12:00) (100/60 - 124/40)  RR: 17 (06-10-25 @ 12:00) (14 - 26)  SpO2: 97% (06-10-25 @ 12:00) (92% - 100%)  Wt(kg): --    ----  INTAKE & OUTPUT:  I&O's Summary    09 Jun 2025 07:01  -  10 Nick 2025 07:00  --------------------------------------------------------  IN: 717.6 mL / OUT: 400 mL / NET: 317.6 mL        LABS:                        7.6    9.24  )-----------( 213      ( 10 Nick 2025 06:40 )             23.2     06-10    139  |  102  |  57[H]  ----------------------------<  132[H]  3.1[L]   |  31  |  1.30    Ca    8.4[L]      10 Nick 2025 06:40  Phos  3.8     06-10  Mg     2.0     06-10    TPro  5.7[L]  /  Alb  2.9[L]  /  TBili  0.7  /  DBili  x   /  AST  7[L]  /  ALT  32  /  AlkPhos  39[L]  06-10    PT/INR - ( 10 Nick 2025 06:40 )   PT: 14.2 sec;   INR: 1.22 ratio         PTT - ( 10 Nick 2025 06:40 )  PTT:32.8 sec  Urinalysis Basic - ( 10 Nick 2025 06:40 )    Color: x / Appearance: x / SG: x / pH: x  Gluc: 132 mg/dL / Ketone: x  / Bili: x / Urobili: x   Blood: x / Protein: x / Nitrite: x   Leuk Esterase: x / RBC: x / WBC x   Sq Epi: x / Non Sq Epi: x / Bacteria: x      CAPILLARY BLOOD GLUCOSE      POCT Blood Glucose.: 228 mg/dL (10 Nick 2025 12:02)  POCT Blood Glucose.: 166 mg/dL (10 Nick 2025 07:59)  POCT Blood Glucose.: 198 mg/dL (09 Jun 2025 21:12)  POCT Blood Glucose.: 166 mg/dL (09 Jun 2025 17:20)        Culture - Sputum (collected 06-08-25 @ 18:25)  Source: Sputum Sputum  Gram Stain (06-08-25 @ 22:12):    Few Squamous epithelial cells per low power field    Few polymorphonuclear leukocytes per low power field    Few Gram Positive Cocci in Pairs and Chains per oil power field  Preliminary Report (06-09-25 @ 14:32):    Commensal ginger consistent with body site    Urinalysis with Rflx Culture (collected 06-08-25 @ 15:44)    Culture - Urine (collected 06-08-25 @ 15:44)  Source: Clean Catch  Final Report (06-09-25 @ 16:05):    <10,000 CFU/mL Normal Urogenital Gigner    Culture - Blood (collected 06-08-25 @ 11:20)  Source: Blood Blood-Peripheral  Preliminary Report (06-10-25 @ 14:01):    No growth at 48 Hours    Culture - Blood (collected 06-08-25 @ 11:15)  Source: Blood Blood-Peripheral  Preliminary Report (06-10-25 @ 14:01):    No growth at 48 Hours        ----  DATA REVIEW:  Personally reviewed:  -Vital sign trends: Afebrile, heart rate stable, BP soft but stable, maintaining O2 sats with low flow nasal cannula  -Laboratory results: White blood cell count has normalized, hemoglobin remains low, normocytic, low potassium noted on the BMP, improving renal indices, improving BUN  -Radiology results: CT scan of the chest is ordered  -Microbio results: Gram-positive cocci in pairs and chains in the sputum culture. Blood culture urine culture negative thus far. MRSA PCR negative.  -Consultant recommendations: Spoke with ID, cardiology            ----  ACTIVE MEDICATIONS:  albuterol/ipratropium for Nebulization 3 milliLiter(s) Nebulizer every 6 hours  aMIOdarone    Tablet 100 milliGRAM(s) Oral daily  azithromycin  IVPB      azithromycin  IVPB 500 milliGRAM(s) IV Intermittent every 24 hours  chlorhexidine 2% Cloths 1 Application(s) Topical <User Schedule>  dextrose 5%. 1000 milliLiter(s) IV Continuous <Continuous>  dextrose 5%. 1000 milliLiter(s) IV Continuous <Continuous>  dextrose 50% Injectable 25 Gram(s) IV Push once  dextrose 50% Injectable 12.5 Gram(s) IV Push once  dextrose 50% Injectable 25 Gram(s) IV Push once  dextrose Oral Gel 15 Gram(s) Oral once PRN  droxidopa 100 milliGRAM(s) Oral every 8 hours  fluticasone propionate/ salmeterol 500-50 MICROgram(s) Diskus 1 Dose(s) Inhalation two times a day  gabapentin 400 milliGRAM(s) Oral every 12 hours  glucagon  Injectable 1 milliGRAM(s) IntraMuscular once  heparin   Injectable 5000 Unit(s) SubCutaneous every 8 hours  hydrocortisone sodium succinate Injectable 50 milliGRAM(s) IV Push every 6 hours  insulin lispro (ADMELOG) corrective regimen sliding scale   SubCutaneous three times a day before meals  insulin lispro (ADMELOG) corrective regimen sliding scale   SubCutaneous at bedtime  midodrine 10 milliGRAM(s) Oral every 8 hours  montelukast 10 milliGRAM(s) Oral daily  pantoprazole  Injectable 40 milliGRAM(s) IV Push daily  piperacillin/tazobactam IVPB.. 3.375 Gram(s) IV Intermittent every 8 hours  potassium chloride    Tablet ER 40 milliEquivalent(s) Oral every 4 hours  potassium chloride   Powder 40 milliEquivalent(s) Oral every 4 hours  tiotropium 2.5 MICROgram(s) Inhaler 2 Puff(s) Inhalation daily

## 2025-06-10 NOTE — PROGRESS NOTE ADULT - SUBJECTIVE AND OBJECTIVE BOX
[facility]                                                       Dr. Chisholm, Dr. Franklin, Dr. Baron, Dr. Clark, Dr. Stock                                      [facility], [facility], [address]                                                 [address] [place] [address]                                      Ph: [phone]  Fax: [phone]                                         Ph: [phone]  Fax: [phone]      Patient is a 84y old  Female who presents with a chief complaint of septic shock, PNA (09 Jun 2025 12:36)  Patient seen in follow up for KIMBERLY on CKD>        PAST MEDICAL HISTORY:  COPD (chronic obstructive pulmonary disease)    DM (diabetes mellitus)    Pulmonary fibrosis    PAF (paroxysmal atrial fibrillation)    Hiatal hernia    GIB (gastrointestinal bleeding)    Pericarditis    Shoulder fracture, right    Hematoma    H/O aplastic anemia    History of IBS    H/O osteoporosis    HLD (hyperlipidemia)    PMR (polymyalgia rheumatica)    History of basal cell cancer    Chronic kidney disease (CKD)    Hypotension    Presence of IVC filter    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    History of immunodeficiency    Lumbar compression fracture    H/O hiatal hernia    H/O pulmonary fibrosis    H/O sinus bradycardia    History of fracture of right hip      MEDICATIONS  (STANDING):  aMIOdarone    Tablet 100 milliGRAM(s) Oral daily  azithromycin  IVPB      azithromycin  IVPB 500 milliGRAM(s) IV Intermittent every 24 hours  chlorhexidine 2% Cloths 1 Application(s) Topical <User Schedule>  dextrose 5%. 1000 milliLiter(s) (50 mL/Hr) IV Continuous <Continuous>  dextrose 5%. 1000 milliLiter(s) (100 mL/Hr) IV Continuous <Continuous>  dextrose 50% Injectable 25 Gram(s) IV Push once  dextrose 50% Injectable 12.5 Gram(s) IV Push once  dextrose 50% Injectable 25 Gram(s) IV Push once  droxidopa 100 milliGRAM(s) Oral every 8 hours  fluticasone propionate/ salmeterol 500-50 MICROgram(s) Diskus 1 Dose(s) Inhalation two times a day  gabapentin 400 milliGRAM(s) Oral every 12 hours  glucagon  Injectable 1 milliGRAM(s) IntraMuscular once  heparin   Injectable 5000 Unit(s) SubCutaneous every 8 hours  hydrocortisone sodium succinate Injectable 50 milliGRAM(s) IV Push every 6 hours  insulin lispro (ADMELOG) corrective regimen sliding scale   SubCutaneous three times a day before meals  insulin lispro (ADMELOG) corrective regimen sliding scale   SubCutaneous at bedtime  midodrine 10 milliGRAM(s) Oral every 8 hours  montelukast 10 milliGRAM(s) Oral daily  pantoprazole  Injectable 40 milliGRAM(s) IV Push daily  piperacillin/tazobactam IVPB.. 3.375 Gram(s) IV Intermittent every 8 hours  potassium chloride    Tablet ER 40 milliEquivalent(s) Oral every 4 hours  tiotropium 2.5 MICROgram(s) Inhaler 2 Puff(s) Inhalation daily    MEDICATIONS  (PRN):  dextrose Oral Gel 15 Gram(s) Oral once PRN Blood Glucose LESS THAN 70 milliGRAM(s)/deciliter    T(C): 36.7 (06-10-25 @ 12:00), Max: 37.5 (06-08-25 @ 15:45)  HR: 78 (06-10-25 @ 12:00) (49 - 87)  BP: 100/60 (06-10-25 @ 12:00) (72/47 - 154/48)  RR: 17 (06-10-25 @ 12:00)  SpO2: 97% (06-10-25 @ 12:00)  Wt(kg): --  I&O's Detail    09 Jun 2025 07:01  -  10 Nick 2025 07:00  --------------------------------------------------------  IN:    IV PiggyBack: 250 mL    IV PiggyBack: 200 mL    Norepinephrine: 20 mL    Norepinephrine: 7.6 mL    Oral Fluid: 240 mL  Total IN: 717.6 mL    OUT:    Lactated Ringers: 0 mL    Voided (mL): 400 mL  Total OUT: 400 mL    Total NET: 317.6 mL              PHYSICAL EXAM:  General: No distress  Respiratory: b/l air entry  Cardiovascular: S1 S2  Gastrointestinal: soft  Extremities:  edema                             LABORATORY:                        7.6    9.24  )-----------( 213      ( 10 Nick 2025 06:40 )             23.2     06-10    139  |  102  |  57[H]  ----------------------------<  132[H]  3.1[L]   |  31  |  1.30    Ca    8.4[L]      10 Nick 2025 06:40  Phos  3.8     06-10  Mg     2.0     06-10    TPro  5.7[L]  /  Alb  2.9[L]  /  TBili  0.7  /  DBili  x   /  AST  7[L]  /  ALT  32  /  AlkPhos  39[L]  06-10    Sodium: 139 mmol/L (06-10 @ 06:40)  Sodium: 137 mmol/L (06-09 @ 05:48)  Sodium: 134 mmol/L (06-08 @ 19:35)    Potassium: 3.1 mmol/L (06-10 @ 06:40)  Potassium: 3.8 mmol/L (06-09 @ 05:48)  Potassium: 4.1 mmol/L (06-08 @ 19:35)    Hemoglobin: 7.6 g/dL (06-10 @ 06:40)  Hemoglobin: 7.8 g/dL (06-09 @ 05:48)  Hemoglobin: 8.7 g/dL (06-08 @ 19:35)  Hemoglobin: 7.5 g/dL (06-08 @ 11:20)    Creatinine, Serum 1.30 (06-10 @ 06:40)  Creatinine, Serum 1.40 (06-09 @ 05:48)  Creatinine, Serum 2.00 (06-08 @ 19:35)  Creatinine, Serum 1.90 (06-08 @ 11:20)    CARDIAC MARKERS ( 09 Jun 2025 05:48 )  x     / x     / x     / x     / <1.0 ng/mL  CARDIAC MARKERS ( 08 Jun 2025 19:35 )  x     / x     / x     / x     / <1.0 ng/mL      LIVER FUNCTIONS - ( 10 Nick 2025 06:40 )  Alb: 2.9 g/dL / Pro: 5.7 g/dL / ALK PHOS: 39 U/L / ALT: 32 U/L / AST: 7 U/L / GGT: x           Urinalysis Basic - ( 10 Nick 2025 06:40 )    Color: x / Appearance: x / SG: x / pH: x  Gluc: 132 mg/dL / Ketone: x  / Bili: x / Urobili: x   Blood: x / Protein: x / Nitrite: x   Leuk Esterase: x / RBC: x / WBC x   Sq Epi: x / Non Sq Epi: x / Bacteria: x

## 2025-06-10 NOTE — PROGRESS NOTE ADULT - ASSESSMENT
84 female PMH of A-fib on Eliquis, COPD, CKD, aplastic anemia, polymyalgia rheumatica, on chronic steroids, avascular necrosis of hips, HLD, HTN, DM, recent admission to [place] 5/26 through 6/5/2025 for CHF/fluid overload/COPD exacerbation, presents to the ED form [facility] for evaluation of fever and generalized feeling of being unwell, found to be septic and hypotensive.      recently admitted to [place] earlier this month for heart failure exacerbation.  Now presenting from rehab with a generalized feeling of unwell and fever.     has been complaining of right-sided pleuritic chest pain, now resolved.   Her x-ray shows a focal consolidation in the right lung. Remains on abx  EKG does not have any signs of acute ischemia   No significant changes from baseline.  Troponin is negative   monitor on tele     Bp stable    has a baseline dysautonomia with resultant severe orthostasis.     significantly hypotensive in the ER with systolics now down to the 70s despite IV fluid boluses.   remains on midodrine 10mg TID, sp Levo     ECHO 6/2/25:  LVEF 71%, Normal RV size, thickness, and function, LA moderately dilated, mild MR, Pulmonary artery systolic pressure is 54 mmHg, Mild LA,  Mild TR, Small bilateral pleural effusion, Increased LV mass and concentric hypertrophy  Would hold her diuretic therapy at this time given that she appears dry on exam.  S/p IVF.    history of paroxysmal atrial fibrillation and DVT PE  Can hold her Eliquis for now until hemoglobin stable. Resume as soon as safe to do so   She is maintaining sinus rhythm.    Continue home dose of amiodarone.  Monitor LFTs.    Anemia per primary, hx aplastic anemia     Monitor and replete electrolytes. Keep K>4.0 and Mg>2.0.     84 female PMH of A-fib on Eliquis, COPD, CKD, aplastic anemia, polymyalgia rheumatica, on chronic steroids, avascular necrosis of hips, HLD, HTN, DM, recent admission to [place] [place] 5/26 through 6/5/2025 for CHF/fluid overload/COPD exacerbation, presents to the ED form [facility] for evaluation of fever and generalized feeling of being unwell, found to be septic and hypotensive.      recently admitted to [place] earlier this month for heart failure exacerbation.  Now presenting from rehab with a generalized feeling of unwell and fever.     has been complaining of right-sided pleuritic chest pain, now resolved.   chest was tender on exam to touch this am   Her x-ray shows a focal consolidation in the right lung. Remains on abx  EKG does not have any signs of acute ischemia   No significant changes from baseline.  Troponin is negative   monitor on tele     Bp stable    has a baseline dysautonomia with resultant severe orthostasis.     significantly hypotensive in the ER with systolics now down to the 70s despite IV fluid boluses.   remains on midodrine 10mg TID, sp Levo     ECHO 6/2/25:  LVEF 71%, Normal RV size, thickness, and function, LA moderately dilated, mild MR, Pulmonary artery systolic pressure is 54 mmHg, Mild ME,  Mild TR, Small bilateral pleural effusion, Increased LV mass and concentric hypertrophy  Would hold her diuretic therapy at this time given that she appears dry on exam.  S/p IVF.    history of paroxysmal atrial fibrillation and DVT PE  Can hold her Eliquis for now until hemoglobin stable. Resume as soon as safe to do so   She is maintaining sinus rhythm.    Continue home dose of amiodarone.  Monitor LFTs.    Anemia per primary, hx aplastic anemia     Monitor and replete electrolytes. Keep K>4.0 and Mg>2.0.

## 2025-06-10 NOTE — PHYSICAL THERAPY INITIAL EVALUATION ADULT - PERTINENT HX OF CURRENT PROBLEM, REHAB EVAL
84 yr old female with PMHx afib on eliquis, COPD (not on home O2), PE/Rt lower extremity DVT 2017, Rt LE IVC filter 2017 CKD3, aplastic anemia, polymyalgia rheumatica on prednisone 5 mg po qd, requiring PRBC transfusions ~8 weeks (via Rt subclavian mediport), AVN bilat hips, HTN, HLD, HFpEF (75% 6.2.25), diastolic dysfx grade1, recent hospitalization 5/25/25-6/5/25 for AHDF/COPD exacerbation, pt also with hx of IVC filter, had Rt subclavian mediport. Pt with eventual improvement and transfer to [facility] facility from where she presents to E.D. today with c/o Rt sided chest pain. general malaise. Pt stated began to feel ill evening before presentation, daughter this a.m. endorsed pt lethargic, pale.

## 2025-06-10 NOTE — PROGRESS NOTE ADULT - SUBJECTIVE AND OBJECTIVE BOX
CHIEF COMPLAINT/ REASON FOR VISIT  .. Patient was seen to address the  issue listed under PROBLEM LIST which is located toward bottom of this note     MARTINEZ PATEL TELE 306 W1    Allergies    No Known Allergies    Intolerances        PAST MEDICAL & SURGICAL HISTORY:  COPD (chronic obstructive pulmonary disease)      DM (diabetes mellitus)  diet-controlled      PAF (paroxysmal atrial fibrillation)  s/p ablation      Hiatal hernia      H/O aplastic anemia      History of IBS      H/O osteoporosis      HLD (hyperlipidemia)      PMR (polymyalgia rheumatica)      History of basal cell cancer      Chronic kidney disease (CKD)      Hypotension      Presence of IVC filter      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      History of immunodeficiency      Lumbar compression fracture      H/O hiatal hernia      H/O pulmonary fibrosis      H/O sinus bradycardia      History of fracture of right hip  "unoperable"      S/P hysterectomy      S/P foot surgery      S/P knee surgery      After cataract  bilateral eye cataract surgically removed      Closed right hip fracture  rigth hip ORIF july 19 2016      S/P IVC filter  nov 2016      S/P carpal tunnel release  Right 2008 & 6/27/17      H/O kyphoplasty      Port-A-Cath in place  right chest          FAMILY HISTORY:  Family history of bladder cancer (Mother)    Family history of myocardial infarction (Father)    FH: atrial fibrillation (Father)        Home Medications:  amiodarone 200 mg oral tablet: 0.5 tab(s) orally once a day (08 Jun 2025 16:43)  Bentyl 10 mg oral capsule: 1 cap(s) orally 2 times a day, As Needed (08 Jun 2025 16:43)  Eliquis 2.5 mg oral tablet: 1 tab(s) orally 2 times a day (08 Jun 2025 16:43)  esomeprazole 20 mg oral delayed release capsule: 1 cap(s) orally once a day (08 Jun 2025 16:43)  fluticasone-salmeterol 500 mcg-50 mcg/inh inhalation powder: 1 puff(s) inhaled 2 times a day (08 Jun 2025 16:43)  folic acid 1 mg oral tablet: 1 tab(s) orally once a day (in the morning) (08 Jun 2025 16:43)  gabapentin 400 mg oral capsule: 1 cap(s) orally 2 times a day (08 Jun 2025 16:43)  ipratropium-albuterol 0.5 mg-2.5 mg/3 mL inhalation solution: 3 milliliter(s) inhaled every 6 hours As needed Shortness of Breath and/or Wheezing (08 Jun 2025 16:43)  IVIG monthly:  (08 Jun 2025 16:43)  leflunomide 10 mg oral tablet: 1 tab(s) orally once a day (08 Jun 2025 16:43)  midodrine 10 mg oral tablet: 1 tab(s) orally every 8 hours (08 Jun 2025 16:43)  montelukast 10 mg oral tablet: 1 tab(s) orally once a day (08 Jun 2025 16:43)  pantoprazole 40 mg oral delayed release tablet: 1 tab(s) orally once a day (before a meal) (08 Jun 2025 16:43)  predniSONE 5 mg oral tablet: 1 tab(s) orally once a day (08 Jun 2025 16:43)  Procrit 10,000 units/mL preservative-free injectable solution: injectable once a week WEDNESDAY (08 Jun 2025 16:43)  Reclast Infusion , IV x 1 yearly:  (08 Jun 2025 16:43)  simvastatin 10 mg oral tablet: 1 tab(s) orally once a day (at bedtime) (08 Jun 2025 16:43)  tiotropium 2.5 mcg/inh inhalation aerosol: 2 puff(s) inhaled once a day (08 Jun 2025 16:43)  tiZANidine: 2 tab(s) orally once a day (at bedtime) as needed for  muscle spasm (08 Jun 2025 16:43)  torsemide 20 mg oral tablet: 1 tab(s) orally once a day (in the morning) (08 Jun 2025 16:49)      MEDICATIONS  (STANDING):  aMIOdarone    Tablet 100 milliGRAM(s) Oral daily  azithromycin  IVPB      azithromycin  IVPB 500 milliGRAM(s) IV Intermittent every 24 hours  chlorhexidine 2% Cloths 1 Application(s) Topical <User Schedule>  dextrose 5%. 1000 milliLiter(s) (50 mL/Hr) IV Continuous <Continuous>  dextrose 5%. 1000 milliLiter(s) (100 mL/Hr) IV Continuous <Continuous>  dextrose 50% Injectable 25 Gram(s) IV Push once  dextrose 50% Injectable 25 Gram(s) IV Push once  dextrose 50% Injectable 12.5 Gram(s) IV Push once  droxidopa 100 milliGRAM(s) Oral every 8 hours  fluticasone propionate/ salmeterol 500-50 MICROgram(s) Diskus 1 Dose(s) Inhalation two times a day  gabapentin 400 milliGRAM(s) Oral every 12 hours  glucagon  Injectable 1 milliGRAM(s) IntraMuscular once  heparin   Injectable 5000 Unit(s) SubCutaneous every 8 hours  hydrocortisone sodium succinate Injectable 50 milliGRAM(s) IV Push every 6 hours  insulin lispro (ADMELOG) corrective regimen sliding scale   SubCutaneous three times a day before meals  insulin lispro (ADMELOG) corrective regimen sliding scale   SubCutaneous at bedtime  midodrine 10 milliGRAM(s) Oral every 8 hours  montelukast 10 milliGRAM(s) Oral daily  pantoprazole  Injectable 40 milliGRAM(s) IV Push daily  piperacillin/tazobactam IVPB.. 3.375 Gram(s) IV Intermittent every 8 hours  tiotropium 2.5 MICROgram(s) Inhaler 2 Puff(s) Inhalation daily    MEDICATIONS  (PRN):  dextrose Oral Gel 15 Gram(s) Oral once PRN Blood Glucose LESS THAN 70 milliGRAM(s)/deciliter      Diet, Regular:   DASH/TLC Sodium & Cholesterol Restricted (06-08-25 @ 15:56) [Active]          Vital Signs Last 24 Hrs  T(C): 36.8 (10 Nick 2025 05:07), Max: 37.4 (09 Jun 2025 09:30)  T(F): 98.3 (10 Nick 2025 05:07), Max: 99.3 (09 Jun 2025 09:30)  HR: 53 (10 Nick 2025 05:07) (53 - 87)  BP: 114/65 (10 Nick 2025 05:07) (103/39 - 131/37)  BP(mean): 60 (09 Jun 2025 21:00) (55 - 81)  RR: 17 (10 Nick 2025 05:07) (14 - 26)  SpO2: 97% (10 Nick 2025 05:07) (92% - 100%)    Parameters below as of 10 Nick 2025 05:07  Patient On (Oxygen Delivery Method): nasal cannula  O2 Flow (L/min): 2        06-09-25 @ 07:01  -  06-10-25 @ 07:00  --------------------------------------------------------  IN: 717.6 mL / OUT: 400 mL / NET: 317.6 mL              LABS:                        7.6    9.24  )-----------( 213      ( 10 Nick 2025 06:40 )             23.2     06-10    139  |  102  |  57[H]  ----------------------------<  132[H]  3.1[L]   |  31  |  1.30    Ca    8.4[L]      10 Nick 2025 06:40  Phos  3.8     06-10  Mg     2.0     06-10    TPro  5.7[L]  /  Alb  2.9[L]  /  TBili  0.7  /  DBili  x   /  AST  7[L]  /  ALT  32  /  AlkPhos  39[L]  06-10    PT/INR - ( 10 Nick 2025 06:40 )   PT: 14.2 sec;   INR: 1.22 ratio         PTT - ( 10 Nick 2025 06:40 )  PTT:32.8 sec  Urinalysis Basic - ( 10 Nick 2025 06:40 )    Color: x / Appearance: x / SG: x / pH: x  Gluc: 132 mg/dL / Ketone: x  / Bili: x / Urobili: x   Blood: x / Protein: x / Nitrite: x   Leuk Esterase: x / RBC: x / WBC x   Sq Epi: x / Non Sq Epi: x / Bacteria: x            WBC:  WBC Count: 9.24 K/uL (06-10 @ 06:40)  WBC Count: 24.78 K/uL (06-09 @ 05:48)  WBC Count: 42.66 K/uL (06-08 @ 19:35)  WBC Count: 32.33 K/uL (06-08 @ 11:20)      MICROBIOLOGY:  RECENT CULTURES:  06-08 Sputum Sputum XXXX   Few Squamous epithelial cells per low power field  Few polymorphonuclear leukocytes per low power field  Few Gram Positive Cocci in Pairs and Chains per oil power field   Commensal ginger consistent with body site    06-08 Clean Catch XXXX XXXX   <10,000 CFU/mL Normal Urogenital Ginger    06-08 Blood Blood-Peripheral XXXX XXXX   No growth at 24 hours    06-08 Blood Blood-Peripheral XXXX XXXX   No growth at 24 hours          CARDIAC MARKERS ( 09 Jun 2025 05:48 )  x     / x     / x     / x     / <1.0 ng/mL  CARDIAC MARKERS ( 08 Jun 2025 19:35 )  x     / x     / x     / x     / <1.0 ng/mL  CARDIAC MARKERS ( 08 Jun 2025 11:20 )  x     / x     / x     / x     / <1.0 ng/mL        PT/INR - ( 10 Nick 2025 06:40 )   PT: 14.2 sec;   INR: 1.22 ratio         PTT - ( 10 Nick 2025 06:40 )  PTT:32.8 sec    Sodium:  Sodium: 139 mmol/L (06-10 @ 06:40)  Sodium: 137 mmol/L (06-09 @ 05:48)  Sodium: 134 mmol/L (06-08 @ 19:35)  Sodium: 137 mmol/L (06-08 @ 11:20)      1.30 mg/dL 06-10 @ 06:40  1.40 mg/dL 06-09 @ 05:48  2.00 mg/dL 06-08 @ 19:35  1.90 mg/dL 06-08 @ 11:20      Hemoglobin:  Hemoglobin: 7.6 g/dL (06-10 @ 06:40)  Hemoglobin: 7.8 g/dL (06-09 @ 05:48)  Hemoglobin: 8.7 g/dL (06-08 @ 19:35)  Hemoglobin: 7.5 g/dL (06-08 @ 11:20)      Platelets: Platelet Count - Automated: 213 K/uL (06-10 @ 06:40)  Platelet Count - Automated: 190 K/uL (06-09 @ 05:48)  Platelet Count - Automated: 200 K/uL (06-08 @ 19:35)  Platelet Count - Automated: 153 K/uL (06-08 @ 11:20)      LIVER FUNCTIONS - ( 10 Nick 2025 06:40 )  Alb: 2.9 g/dL / Pro: 5.7 g/dL / ALK PHOS: 39 U/L / ALT: 32 U/L / AST: 7 U/L / GGT: x             Urinalysis Basic - ( 10 Nick 2025 06:40 )    Color: x / Appearance: x / SG: x / pH: x  Gluc: 132 mg/dL / Ketone: x  / Bili: x / Urobili: x   Blood: x / Protein: x / Nitrite: x   Leuk Esterase: x / RBC: x / WBC x   Sq Epi: x / Non Sq Epi: x / Bacteria: x        RADIOLOGY & ADDITIONAL STUDIES:      MICROBIOLOGY:  RECENT CULTURES:  06-08 Sputum Sputum XXXX   Few Squamous epithelial cells per low power field  Few polymorphonuclear leukocytes per low power field  Few Gram Positive Cocci in Pairs and Chains per oil power field   Commensal ginger consistent with body site    06-08 Clean Catch XXXX XXXX   <10,000 CFU/mL Normal Urogenital Ginger    06-08 Blood Blood-Peripheral XXXX XXXX   No growth at 24 hours    06-08 Blood Blood-Peripheral XXXX XXXX   No growth at 24 hours

## 2025-06-10 NOTE — PROGRESS NOTE ADULT - PROBLEM SELECTOR PLAN 1
-in the ED, tmax of 101 w/ KIMBERLY on CKD with Cr 1.90 (baseline 1.2-1.6), Hypotensive with SBP 80's (pt on midodrine therapy, usual 's). also w/ leukocytosis of 32.33  - procalcitonin of 13.6, RVP neg    - Patient was downgraded from the ICU to telemetry on 6/10  - CXR: Dense infiltrate off the right upper hilum is presently seen  - CT of the chest is pending  - c/w azithromycin and zosyn, for now -infectious disease consult was requested  - The patient has been weaned off of vasopressors, monitor hemodynamics closely  - c/w solumedrol 50mg q6, plan to transition to p.o. steroids  - f/u legionella, strep pneumo   - MRSA/MSSA PCR negative  - trend WBC and fever curve -   - Supplemental O2 prn to maintain SPO2 > 92%

## 2025-06-10 NOTE — CONSULT NOTE ADULT - ASSESSMENT
84 yr old female with PMHx afib on eliquis, COPD (not on home O2), PE/Rt lower extremity DVT 2017, Rt LE IVC filter 2017 CKD3, aplastic anemia, polymyalgia rheumatica on prednisone 5 mg po qd, requiring PRBC transfusions ~8 weeks (via Rt subclavian mediport), AVN bilat hips, HTN, HLD, HFpEF (75% 6.2.25), diastolic dysfx grade1, recent hospitalization 5/25/25-6/5/25 for AHDF/COPD exacerbation, pt also with hx of IVC filter, had Rt subclavian mediport, who was admitted on 6/8 with c/o Rt sided chest pain. general malaise. Pt stated began to feel ill evening before presentation, daughter this a.m. endorsed pt lethargic, pale. Patient was initially admitted in the ICU for hypotension requiring pressors. She is transferred out of the ICU. She still reports some R sided chest pain and SOB. Reports cough which started when she arrived in the hospital. Has some blood tinged sputum.    ID consulted for pneumonia. CXR showed dense infiltrate off the right upper hilum. She is still symptomatic, but afebrile and transferred out of the ICU. Leukocytosis resolved. Blood cultures currently no growth. COVID/FLU/RSV negative. MRSA nasal PCR negative. Respiratory culture growing commensal marilee consistent with body site.    #R sided pneumonia  #Leukocytosis    -continue Zosyn  -continue azithromycin pending urine legionella antigen  -urine strep and legionella antigen negative  -follow cultures to completion  -consider CT chest    Thank you for courtesy of this consult.     Will follow.  Discussed with the primary team.     Kimberly Velazco MD  Division of Infectious Diseases   Cell [phone] between 8am and 6pm   After 6pm and weekends please call ID service at [phone].     55 minutes spent on total encounter assessing patient, examination, chart review, counseling and coordinating care by the attending physician/nurse/care manager.

## 2025-06-10 NOTE — PROGRESS NOTE ADULT - ASSESSMENT
Prerenal azotemia, CKD 3  Dyspnea: Pneumonia, h/o COPD/CHF  Shock   Diabetes  Aplastic anemia, requiring frequent blood transfusions    06/08/25: Pressor support as needed. Hold diuretics. Optimize BP. ICU evalauation. Monitor blood sugar levels. Insulin coverage as needed. Check cultures, IV abx.   Monitor h/h trend. Transfuse PRBC's PRN. Avoid nephrotoxic meds as possible. Avoid ACEI, ARB, NSAIDs and IV contrast. Will follow electrolytes and renal function trend.   06/09/25: Improved renal indices. On pressor support. No objection to resume diuretics. Cardiology follow up. D/w pt's daughter at bedside. ICU management.   06/10/25: Improved renal indices. Potassium supplementation. To continue current meds. Cardiology follow up.

## 2025-06-10 NOTE — PHYSICAL THERAPY INITIAL EVALUATION ADULT - BALANCE TRAINING, PT EVAL
Patient's mother notified.    Patient will improve balance 1/2 a grade within 3-5 sessions to improve safety with functional mobility.

## 2025-06-10 NOTE — PROGRESS NOTE ADULT - PROBLEM SELECTOR PLAN 7
-chronic recent hospitalization 5/25/25-6/5/25 for AHDF/COPD exacerbation  -Bronchodilators q 6 hrs prn for sob/wheezes  -c/w home med of Advair 500/50 q 12 hrs  -c/w home med tiotropium   -Supplemental O2 prn to maintain SPO2 > 92%

## 2025-06-10 NOTE — PHYSICAL THERAPY INITIAL EVALUATION ADULT - ADDITIONAL COMMENTS
Patient from [facility], however prior to [facility], patient lives in private home, +ramp access, then resides on main level. Patient was independent in all ADLs, has home O2, and uses RW to ambulate in home.  Patient uses wheelchair for long distance locomotion.

## 2025-06-10 NOTE — CHART NOTE - NSCHARTNOTEFT_GEN_A_CORE
Reviewed w/ consultant team  Will start heparin gtt due to pt's risk factors for VTE, pleuritic chest pain. If bleeding worsens, will rec protamine and dc the infusion. Will order V-Q scan for tomorrow.

## 2025-06-10 NOTE — PROGRESS NOTE ADULT - PROBLEM SELECTOR PLAN 3
- The patient is noted to have blood-tinged sputum  - requiring PRBC transfusions ~8 weeks (via Rt subclavian mediport)  - Hgb 7.6 (baseline appears to be ~8)   - s/p 1U PRBC   - transfuse <7-7.5  - Will hold Eliquis until clinical improvement is noted

## 2025-06-10 NOTE — PROGRESS NOTE ADULT - PROBLEM SELECTOR PLAN 8
chronic rate controlled   monitor sputum production ( blood tinged today )   would benefit overall from therapeutic AC but given low h/h, agree w/ heparin sq for now  c/w amiodarone 100mg qd.

## 2025-06-10 NOTE — CONSULT NOTE ADULT - SUBJECTIVE AND OBJECTIVE BOX
[facility]  INFECTIOUS DISEASES - Emma Donnelly, [address], [place] [address]  Tel: [phone]     Fax: [phone]  =======================================================    [id number]  MARTINEZ ROBERT     CC: Patient is a 84y old  Female who presents with a chief complaint of septic shock, PNA (10 Nick 2025 11:53)    HPI:  84 yr old female with PMHx afib on eliquis, COPD (not on home O2), PE/Rt lower extremity DVT 2017, Rt LE IVC filter 2017 CKD3, aplastic anemia, polymyalgia rheumatica on prednisone 5 mg po qd, requiring PRBC transfusions ~8 weeks (via Rt subclavian mediport), AVN bilat hips, HTN, HLD, HFpEF (75% 6.2.25), diastolic dysfx grade1, recent hospitalization 5/25/25-6/5/25 for AHDF/COPD exacerbation, pt also with hx of IVC filter, had Rt subclavian mediport, who was admitted on 6/8 with c/o Rt sided chest pain. general malaise. Pt stated began to feel ill evening before presentation, daughter this a.m. endorsed pt lethargic, pale.     Patient was initially admitted in the ICU for hypotension requiring pressors. She is transferred out of the ICU. She still reports some R sided chest pain and SOB. Reports cough which started when she arrived in the hospital. Has some blood tinged sputum.        PAST MEDICAL & SURGICAL HISTORY:  COPD (chronic obstructive pulmonary disease)      DM (diabetes mellitus)  diet-controlled      PAF (paroxysmal atrial fibrillation)  s/p ablation      Hiatal hernia      H/O aplastic anemia      History of IBS      H/O osteoporosis      HLD (hyperlipidemia)      PMR (polymyalgia rheumatica)      History of basal cell cancer      Chronic kidney disease (CKD)      Hypotension      Presence of IVC filter      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      History of immunodeficiency      Lumbar compression fracture      H/O hiatal hernia      H/O pulmonary fibrosis      H/O sinus bradycardia      History of fracture of right hip  "unoperable"      S/P hysterectomy      S/P foot surgery      S/P knee surgery      After cataract  bilateral eye cataract surgically removed      Closed right hip fracture  rigth hip ORIF july 19 2016      S/P IVC filter  nov 2016      S/P carpal tunnel release  Right 2008 & 6/27/17      H/O kyphoplasty      Port-A-Cath in place  right chest          Social Hx:     FAMILY HISTORY:  Family history of bladder cancer (Mother)    Family history of myocardial infarction (Father)    FH: atrial fibrillation (Father)        Allergies    No Known Allergies    Intolerances        Antibiotics:  MEDICATIONS  (STANDING):  albuterol/ipratropium for Nebulization 3 milliLiter(s) Nebulizer every 6 hours  aMIOdarone    Tablet 100 milliGRAM(s) Oral daily  azithromycin  IVPB      azithromycin  IVPB 500 milliGRAM(s) IV Intermittent every 24 hours  chlorhexidine 2% Cloths 1 Application(s) Topical <User Schedule>  dextrose 5%. 1000 milliLiter(s) (50 mL/Hr) IV Continuous <Continuous>  dextrose 5%. 1000 milliLiter(s) (100 mL/Hr) IV Continuous <Continuous>  dextrose 50% Injectable 25 Gram(s) IV Push once  dextrose 50% Injectable 12.5 Gram(s) IV Push once  dextrose 50% Injectable 25 Gram(s) IV Push once  droxidopa 100 milliGRAM(s) Oral every 8 hours  fluticasone propionate/ salmeterol 500-50 MICROgram(s) Diskus 1 Dose(s) Inhalation two times a day  gabapentin 400 milliGRAM(s) Oral every 12 hours  glucagon  Injectable 1 milliGRAM(s) IntraMuscular once  heparin   Injectable 5000 Unit(s) SubCutaneous every 8 hours  hydrocortisone sodium succinate Injectable 50 milliGRAM(s) IV Push every 6 hours  insulin lispro (ADMELOG) corrective regimen sliding scale   SubCutaneous three times a day before meals  insulin lispro (ADMELOG) corrective regimen sliding scale   SubCutaneous at bedtime  midodrine 10 milliGRAM(s) Oral every 8 hours  montelukast 10 milliGRAM(s) Oral daily  pantoprazole  Injectable 40 milliGRAM(s) IV Push daily  piperacillin/tazobactam IVPB.. 3.375 Gram(s) IV Intermittent every 8 hours  potassium chloride    Tablet ER 40 milliEquivalent(s) Oral every 4 hours  potassium chloride   Powder 40 milliEquivalent(s) Oral every 4 hours  tiotropium 2.5 MICROgram(s) Inhaler 2 Puff(s) Inhalation daily    MEDICATIONS  (PRN):  dextrose Oral Gel 15 Gram(s) Oral once PRN Blood Glucose LESS THAN 70 milliGRAM(s)/deciliter       REVIEW OF SYSTEMS:  CONSTITUTIONAL:  No Fever or chills  HEENT:  No sore throat or runny nose.  CARDIOVASCULAR: + R sided chest pain  RESPIRATORY:  + cough, shortness of breath  GASTROINTESTINAL:  No nausea, vomiting or diarrhea.  GENITOURINARY:  No dysuria  MUSCULOSKELETAL:  no back pain  NEUROLOGIC:  No headache or dizziness  PSYCHIATRIC:  No disorder of thought or mood.    Physical Exam:  Vital Signs Last 24 Hrs  T(C): 36.7 (10 Nick 2025 12:00), Max: 37.2 (09 Jun 2025 13:45)  T(F): 98.1 (10 Nick 2025 12:00), Max: 99 (09 Jun 2025 13:45)  HR: 78 (10 Nick 2025 12:00) (53 - 87)  BP: 100/60 (10 Nick 2025 12:00) (100/60 - 124/40)  BP(mean): 60 (09 Jun 2025 21:00) (56 - 70)  RR: 17 (10 Nick 2025 12:00) (14 - 26)  SpO2: 97% (10 Nick 2025 12:00) (92% - 100%)    Parameters below as of 10 Nick 2025 12:00  Patient On (Oxygen Delivery Method): nasal cannula  O2 Flow (L/min): 2      GEN: NAD  HEENT: normocephalic and atraumatic.   NECK: Supple.   LUNGS: Normal respiratory effort  HEART: Regular rate and rhythm   ABDOMEN: Soft, nontender, and nondistended.    EXTREMITIES: No leg edema.  NEUROLOGIC: Answering questions appropriately  PSYCHIATRIC: Appropriate affect .      Labs:  06-10    139  |  102  |  57[H]  ----------------------------<  132[H]  3.1[L]   |  31  |  1.30    Ca    8.4[L]      10 Nick 2025 06:40  Phos  3.8     06-10  Mg     2.0     06-10    TPro  5.7[L]  /  Alb  2.9[L]  /  TBili  0.7  /  DBili  x   /  AST  7[L]  /  ALT  32  /  AlkPhos  39[L]  06-10                          7.6    9.24  )-----------( 213      ( 10 Nick 2025 06:40 )             23.2     PT/INR - ( 10 Nick 2025 06:40 )   PT: 14.2 sec;   INR: 1.22 ratio         PTT - ( 10 Nick 2025 06:40 )  PTT:32.8 sec  Urinalysis Basic - ( 10 Nick 2025 06:40 )    Color: x / Appearance: x / SG: x / pH: x  Gluc: 132 mg/dL / Ketone: x  / Bili: x / Urobili: x   Blood: x / Protein: x / Nitrite: x   Leuk Esterase: x / RBC: x / WBC x   Sq Epi: x / Non Sq Epi: x / Bacteria: x      LIVER FUNCTIONS - ( 10 Nick 2025 06:40 )  Alb: 2.9 g/dL / Pro: 5.7 g/dL / ALK PHOS: 39 U/L / ALT: 32 U/L / AST: 7 U/L / GGT: x           CARDIAC MARKERS ( 09 Jun 2025 05:48 )  x     / x     / x     / x     / <1.0 ng/mL  CARDIAC MARKERS ( 08 Jun 2025 19:35 )  x     / x     / x     / x     / <1.0 ng/mL        Procalcitonin: 13.61 ng/mL (06-08-25 @ 11:20)    C-Reactive Protein: 76 mg/L (06-08-25 @ 11:20)    Sedimentation Rate, Erythrocyte: 17 mm/hr (06-08-25 @ 11:20)    SARS-CoV-2 Result: NotDetec (06-08-25 @ 11:20)  SARS-CoV-2: NotDetec (05-26-25 @ 18:00)      RECENT CULTURES:  06-08 @ 18:25 Sputum Sputum     Commensal marilee consistent with body site    Few Squamous epithelial cells per low power field  Few polymorphonuclear leukocytes per low power field  Few Gram Positive Cocci in Pairs and Chains per oil power field      06-08 @ 15:44 Clean Catch     <10,000 CFU/mL Normal Urogenital Marilee        06-08 @ 11:20 Blood Blood-Peripheral     No growth at 24 hours        06-08 @ 11:15 Blood Blood-Peripheral     No growth at 24 hours              All imaging and other data have been reviewed.  < from: Xray Chest 1 View- PORTABLE-Urgent (Xray Chest 1 View- PORTABLE-Urgent .) (06.08.25 @ 12:04) >  Heart magnified by technique. Right-sided port again noted.    Heart appears to be a T12 vertebroplasty density with partial collapse   again noted.    On June 5 there were mild bibasilar findings mainly pleural on the left   and slight infiltrate in the right.    These are no longer evident.    The present film shows a dense infiltrate off the right upper hilum.    This is new.    IMPRESSION: Dense infiltrate off the right upper hilum is presently seen.    --- End of Report ---        < end of copied text >

## 2025-06-10 NOTE — PROGRESS NOTE ADULT - PROBLEM SELECTOR PLAN 5
-most likely pre-renal due to hypotension   -suspect atn due to shock state  -improving indices  -Cr 1.90 (baseline 1.2-1.6)    -avoid nephrotoxic meds  -nephro following

## 2025-06-11 LAB
ALBUMIN SERPL ELPH-MCNC: 3 G/DL — LOW (ref 3.3–5)
ALP SERPL-CCNC: 33 U/L — LOW (ref 40–120)
ALT FLD-CCNC: 27 U/L — SIGNIFICANT CHANGE UP (ref 12–78)
ANION GAP SERPL CALC-SCNC: 6 MMOL/L — SIGNIFICANT CHANGE UP (ref 5–17)
APTT BLD: 26.8 SEC — SIGNIFICANT CHANGE UP (ref 26.1–36.8)
AST SERPL-CCNC: 6 U/L — LOW (ref 15–37)
BILIRUB SERPL-MCNC: 0.6 MG/DL — SIGNIFICANT CHANGE UP (ref 0.2–1.2)
BUN SERPL-MCNC: 52 MG/DL — HIGH (ref 7–23)
CALCIUM SERPL-MCNC: 7.6 MG/DL — LOW (ref 8.5–10.1)
CHLORIDE SERPL-SCNC: 107 MMOL/L — SIGNIFICANT CHANGE UP (ref 96–108)
CO2 SERPL-SCNC: 28 MMOL/L — SIGNIFICANT CHANGE UP (ref 22–31)
CREAT SERPL-MCNC: 1.3 MG/DL — SIGNIFICANT CHANGE UP (ref 0.5–1.3)
EGFR: 41 ML/MIN/1.73M2 — LOW
EGFR: 41 ML/MIN/1.73M2 — LOW
GLUCOSE SERPL-MCNC: 104 MG/DL — HIGH (ref 70–99)
HCT VFR BLD CALC: 22.6 % — LOW (ref 34.5–45)
HCT VFR BLD CALC: 24.2 % — LOW (ref 34.5–45)
HGB BLD-MCNC: 7.1 G/DL — LOW (ref 11.5–15.5)
HGB BLD-MCNC: 7.6 G/DL — LOW (ref 11.5–15.5)
M PNEUMO DNA SPEC QL NAA+PROBE: NEGATIVE — SIGNIFICANT CHANGE UP
MCHC RBC-ENTMCNC: 30.5 PG — SIGNIFICANT CHANGE UP (ref 27–34)
MCHC RBC-ENTMCNC: 30.9 PG — SIGNIFICANT CHANGE UP (ref 27–34)
MCHC RBC-ENTMCNC: 31.4 G/DL — LOW (ref 32–36)
MCHC RBC-ENTMCNC: 31.4 G/DL — LOW (ref 32–36)
MCV RBC AUTO: 97 FL — SIGNIFICANT CHANGE UP (ref 80–100)
MCV RBC AUTO: 98.4 FL — SIGNIFICANT CHANGE UP (ref 80–100)
NRBC BLD AUTO-RTO: 0 /100 WBCS — SIGNIFICANT CHANGE UP (ref 0–0)
NRBC BLD AUTO-RTO: 0 /100 WBCS — SIGNIFICANT CHANGE UP (ref 0–0)
PLATELET # BLD AUTO: 213 K/UL — SIGNIFICANT CHANGE UP (ref 150–400)
PLATELET # BLD AUTO: 263 K/UL — SIGNIFICANT CHANGE UP (ref 150–400)
POTASSIUM SERPL-MCNC: 5.1 MMOL/L — SIGNIFICANT CHANGE UP (ref 3.5–5.3)
POTASSIUM SERPL-SCNC: 5.1 MMOL/L — SIGNIFICANT CHANGE UP (ref 3.5–5.3)
PROT SERPL-MCNC: 5.7 G/DL — LOW (ref 6–8.3)
RBC # BLD: 2.33 M/UL — LOW (ref 3.8–5.2)
RBC # BLD: 2.46 M/UL — LOW (ref 3.8–5.2)
RBC # FLD: 24.5 % — HIGH (ref 10.3–14.5)
RBC # FLD: 24.9 % — HIGH (ref 10.3–14.5)
SODIUM SERPL-SCNC: 141 MMOL/L — SIGNIFICANT CHANGE UP (ref 135–145)
SPECIMEN SOURCE: SIGNIFICANT CHANGE UP
WBC # BLD: 5.92 K/UL — SIGNIFICANT CHANGE UP (ref 3.8–10.5)
WBC # BLD: 7.4 K/UL — SIGNIFICANT CHANGE UP (ref 3.8–10.5)
WBC # FLD AUTO: 5.92 K/UL — SIGNIFICANT CHANGE UP (ref 3.8–10.5)
WBC # FLD AUTO: 7.4 K/UL — SIGNIFICANT CHANGE UP (ref 3.8–10.5)

## 2025-06-11 PROCEDURE — 99232 SBSQ HOSP IP/OBS MODERATE 35: CPT

## 2025-06-11 PROCEDURE — G0545: CPT

## 2025-06-11 PROCEDURE — 99233 SBSQ HOSP IP/OBS HIGH 50: CPT | Mod: GC

## 2025-06-11 RX ORDER — ACETAMINOPHEN 500 MG/5ML
1000 LIQUID (ML) ORAL ONCE
Refills: 0 | Status: COMPLETED | OUTPATIENT
Start: 2025-06-11 | End: 2025-06-11

## 2025-06-11 RX ORDER — FUROSEMIDE 10 MG/ML
20 INJECTION INTRAMUSCULAR; INTRAVENOUS ONCE
Refills: 0 | Status: COMPLETED | OUTPATIENT
Start: 2025-06-11 | End: 2025-06-11

## 2025-06-11 RX ADMIN — HEPARIN SODIUM 5000 UNIT(S): 1000 INJECTION INTRAVENOUS; SUBCUTANEOUS at 06:14

## 2025-06-11 RX ADMIN — Medication 40 MILLIGRAM(S): at 12:45

## 2025-06-11 RX ADMIN — IPRATROPIUM BROMIDE AND ALBUTEROL SULFATE 3 MILLILITER(S): .5; 2.5 SOLUTION RESPIRATORY (INHALATION) at 19:10

## 2025-06-11 RX ADMIN — DROXIDOPA 100 MILLIGRAM(S): 300 CAPSULE ORAL at 12:48

## 2025-06-11 RX ADMIN — Medication 1000 MILLIGRAM(S): at 07:12

## 2025-06-11 RX ADMIN — Medication 50 MILLIGRAM(S): at 06:14

## 2025-06-11 RX ADMIN — GABAPENTIN 400 MILLIGRAM(S): 400 CAPSULE ORAL at 06:12

## 2025-06-11 RX ADMIN — Medication 50 MILLIGRAM(S): at 19:15

## 2025-06-11 RX ADMIN — FUROSEMIDE 20 MILLIGRAM(S): 10 INJECTION INTRAMUSCULAR; INTRAVENOUS at 22:07

## 2025-06-11 RX ADMIN — HEPARIN SODIUM 5000 UNIT(S): 1000 INJECTION INTRAVENOUS; SUBCUTANEOUS at 19:15

## 2025-06-11 RX ADMIN — Medication 1 DOSE(S): at 19:30

## 2025-06-11 RX ADMIN — IPRATROPIUM BROMIDE AND ALBUTEROL SULFATE 3 MILLILITER(S): .5; 2.5 SOLUTION RESPIRATORY (INHALATION) at 07:02

## 2025-06-11 RX ADMIN — Medication 1 APPLICATION(S): at 06:15

## 2025-06-11 RX ADMIN — Medication 25 GRAM(S): at 06:34

## 2025-06-11 RX ADMIN — Medication 1000 MILLIGRAM(S): at 20:30

## 2025-06-11 RX ADMIN — TIOTROPIUM BROMIDE INHALATION SPRAY 2 PUFF(S): 3.12 SPRAY, METERED RESPIRATORY (INHALATION) at 06:14

## 2025-06-11 RX ADMIN — Medication 25 GRAM(S): at 19:56

## 2025-06-11 RX ADMIN — Medication 1 DOSE(S): at 06:34

## 2025-06-11 RX ADMIN — MONTELUKAST SODIUM 10 MILLIGRAM(S): 10 TABLET ORAL at 12:45

## 2025-06-11 RX ADMIN — Medication 400 MILLIGRAM(S): at 19:30

## 2025-06-11 RX ADMIN — DROXIDOPA 100 MILLIGRAM(S): 300 CAPSULE ORAL at 06:13

## 2025-06-11 RX ADMIN — IPRATROPIUM BROMIDE AND ALBUTEROL SULFATE 3 MILLILITER(S): .5; 2.5 SOLUTION RESPIRATORY (INHALATION) at 13:39

## 2025-06-11 RX ADMIN — IPRATROPIUM BROMIDE AND ALBUTEROL SULFATE 3 MILLILITER(S): .5; 2.5 SOLUTION RESPIRATORY (INHALATION) at 01:38

## 2025-06-11 RX ADMIN — MIDODRINE HYDROCHLORIDE 10 MILLIGRAM(S): 5 TABLET ORAL at 12:48

## 2025-06-11 RX ADMIN — Medication 25 GRAM(S): at 01:19

## 2025-06-11 RX ADMIN — DROXIDOPA 100 MILLIGRAM(S): 300 CAPSULE ORAL at 22:06

## 2025-06-11 RX ADMIN — GABAPENTIN 400 MILLIGRAM(S): 400 CAPSULE ORAL at 19:15

## 2025-06-11 RX ADMIN — Medication 400 MILLIGRAM(S): at 06:12

## 2025-06-11 RX ADMIN — AMIODARONE HYDROCHLORIDE 100 MILLIGRAM(S): 50 INJECTION, SOLUTION INTRAVENOUS at 06:12

## 2025-06-11 NOTE — PROGRESS NOTE ADULT - PROBLEM SELECTOR PLAN 3
- The patient is noted to have blood-tinged sputum  - requiring PRBC transfusions ~8 weeks (via Rt subclavian mediport)  - Hgb 7.6 (baseline appears to be ~8)   - s/p 1U PRBC   - transfuse <7-7.5  - Will hold Eliquis until clinical improvement is noted Suspect secondary to shock  -continue midodrine 10 mg every 8 hours  -serial enzymes reviewed  -TTE performed 6/2 showing normal LV function with EF 71% and LV hypertrophy, normal RV size and function, moderate LA dilation   - f/u TTE  - The patient has been weaned off of vasopressors, monitor hemodynamics closely  -cardio following

## 2025-06-11 NOTE — PROGRESS NOTE ADULT - ASSESSMENT
IMPRESSION  J18.8:     Other pneumonia, unspecified organism  A41.8:    Other specified sepsis  D46.7:    Other myelodysplastic syndromes  J44.0:     Chronic obstructive pulmonary disease with acute lower respiratory infection  I50          congestive heart failure  D63.8     Anemia chronic disease  D63.1     Anemia CKD  N18.31   CKD3a    Myelodysplasia who is transfusion and procrit dependent recently admitted with CHF and COPD exacerbation  Hgb declined during last admission s/p 1UPRBC 06/02   Hgb 7.6 increased to 8.5 post transfusion  Recently DC to [facility]  readmitted with pneumonia, sepsis to MICU.   transfered to medical unit 06/10/25    BL LE dec BS  dyspnea    RECOMMENDATIONS    CHF and COPD  mgmt per medicine, pulm and Cardiology  on prednisone 40mg last admission  Currently on Hydrocortisone  on nebs, montelukast, fluticasone    Hypotension  BP mgmt per MICU    Pneumonia, multifocal with hemoptysis  on Zosyn and azithromycin  mgmt per ID    Anemia  re-check FOBT,     if positive, re-assess risk benefits AC  Consider txfuse 1U  PRBC  typically get symptomatic when Hgb <8    PLAN for transfusion today  Resume procrit if no contraindication tomorrow or next day.     Pleural effusion R side  s/p CT scan, no evidence for volume overload.   noted R effusion    GIB hx  Cont PPI with protonix 40mg  monitor for bleeding  HOLD on FOBT testing as with hemoptysis may be pos.     Afib, CHF  on Amiodarone  off Eliquis 2.5,   on , Midodrine  on Heparin ggt    VTE ppx  hx of IVC filter    Supportive measures otherwise

## 2025-06-11 NOTE — PROGRESS NOTE ADULT - SUBJECTIVE AND OBJECTIVE BOX
CHIEF COMPLAINT/ REASON FOR VISIT  .. Patient was seen to address the  issue listed under PROBLEM LIST which is located toward bottom of this note     MARTINEZ PATEL TELE 306 W1    Allergies    No Known Allergies    Intolerances        PAST MEDICAL & SURGICAL HISTORY:  COPD (chronic obstructive pulmonary disease)      DM (diabetes mellitus)  diet-controlled      PAF (paroxysmal atrial fibrillation)  s/p ablation      Hiatal hernia      H/O aplastic anemia      History of IBS      H/O osteoporosis      HLD (hyperlipidemia)      PMR (polymyalgia rheumatica)      History of basal cell cancer      Chronic kidney disease (CKD)      Hypotension      Presence of IVC filter      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      History of immunodeficiency      Lumbar compression fracture      H/O hiatal hernia      H/O pulmonary fibrosis      H/O sinus bradycardia      History of fracture of right hip  "unoperable"      S/P hysterectomy      S/P foot surgery      S/P knee surgery      After cataract  bilateral eye cataract surgically removed      Closed right hip fracture  rigth hip ORIF july 19 2016      S/P IVC filter  nov 2016      S/P carpal tunnel release  Right 2008 & 6/27/17      H/O kyphoplasty      Port-A-Cath in place  right chest          FAMILY HISTORY:  Family history of bladder cancer (Mother)    Family history of myocardial infarction (Father)    FH: atrial fibrillation (Father)        Home Medications:  amiodarone 200 mg oral tablet: 0.5 tab(s) orally once a day (08 Jun 2025 16:43)  Bentyl 10 mg oral capsule: 1 cap(s) orally 2 times a day, As Needed (08 Jun 2025 16:43)  Eliquis 2.5 mg oral tablet: 1 tab(s) orally 2 times a day (08 Jun 2025 16:43)  esomeprazole 20 mg oral delayed release capsule: 1 cap(s) orally once a day (08 Jun 2025 16:43)  fluticasone-salmeterol 500 mcg-50 mcg/inh inhalation powder: 1 puff(s) inhaled 2 times a day (08 Jun 2025 16:43)  folic acid 1 mg oral tablet: 1 tab(s) orally once a day (in the morning) (08 Jun 2025 16:43)  gabapentin 400 mg oral capsule: 1 cap(s) orally 2 times a day (08 Jun 2025 16:43)  ipratropium-albuterol 0.5 mg-2.5 mg/3 mL inhalation solution: 3 milliliter(s) inhaled every 6 hours As needed Shortness of Breath and/or Wheezing (08 Jun 2025 16:43)  IVIG monthly:  (08 Jun 2025 16:43)  leflunomide 10 mg oral tablet: 1 tab(s) orally once a day (08 Jun 2025 16:43)  midodrine 10 mg oral tablet: 1 tab(s) orally every 8 hours (08 Jun 2025 16:43)  montelukast 10 mg oral tablet: 1 tab(s) orally once a day (08 Jun 2025 16:43)  pantoprazole 40 mg oral delayed release tablet: 1 tab(s) orally once a day (before a meal) (08 Jun 2025 16:43)  predniSONE 5 mg oral tablet: 1 tab(s) orally once a day (08 Jun 2025 16:43)  Procrit 10,000 units/mL preservative-free injectable solution: injectable once a week WEDNESDAY (08 Jun 2025 16:43)  Reclast Infusion , IV x 1 yearly:  (08 Jun 2025 16:43)  simvastatin 10 mg oral tablet: 1 tab(s) orally once a day (at bedtime) (08 Jun 2025 16:43)  tiotropium 2.5 mcg/inh inhalation aerosol: 2 puff(s) inhaled once a day (08 Jun 2025 16:43)  tiZANidine: 2 tab(s) orally once a day (at bedtime) as needed for  muscle spasm (08 Jun 2025 16:43)  torsemide 20 mg oral tablet: 1 tab(s) orally once a day (in the morning) (08 Jun 2025 16:49)      MEDICATIONS  (STANDING):  albuterol/ipratropium for Nebulization 3 milliLiter(s) Nebulizer every 6 hours  aMIOdarone    Tablet 100 milliGRAM(s) Oral daily  azithromycin  IVPB      azithromycin  IVPB 500 milliGRAM(s) IV Intermittent every 24 hours  chlorhexidine 2% Cloths 1 Application(s) Topical <User Schedule>  dextrose 5%. 1000 milliLiter(s) (50 mL/Hr) IV Continuous <Continuous>  dextrose 5%. 1000 milliLiter(s) (100 mL/Hr) IV Continuous <Continuous>  dextrose 50% Injectable 25 Gram(s) IV Push once  dextrose 50% Injectable 12.5 Gram(s) IV Push once  dextrose 50% Injectable 25 Gram(s) IV Push once  droxidopa 100 milliGRAM(s) Oral every 8 hours  fluticasone propionate/ salmeterol 500-50 MICROgram(s) Diskus 1 Dose(s) Inhalation two times a day  gabapentin 400 milliGRAM(s) Oral every 12 hours  glucagon  Injectable 1 milliGRAM(s) IntraMuscular once  heparin   Injectable 5000 Unit(s) SubCutaneous every 12 hours  hydrocortisone sodium succinate Injectable 50 milliGRAM(s) IV Push every 12 hours  insulin lispro (ADMELOG) corrective regimen sliding scale   SubCutaneous three times a day before meals  insulin lispro (ADMELOG) corrective regimen sliding scale   SubCutaneous at bedtime  midodrine 10 milliGRAM(s) Oral every 8 hours  montelukast 10 milliGRAM(s) Oral daily  pantoprazole  Injectable 40 milliGRAM(s) IV Push daily  piperacillin/tazobactam IVPB.. 3.375 Gram(s) IV Intermittent every 8 hours  tiotropium 2.5 MICROgram(s) Inhaler 2 Puff(s) Inhalation daily    MEDICATIONS  (PRN):  dextrose Oral Gel 15 Gram(s) Oral once PRN Blood Glucose LESS THAN 70 milliGRAM(s)/deciliter      Diet, Regular:   DASH/TLC Sodium & Cholesterol Restricted (06-08-25 @ 15:56) [Active]          Vital Signs Last 24 Hrs  T(C): 36.9 (11 Jun 2025 05:28), Max: 36.9 (10 Nick 2025 21:38)  T(F): 98.5 (11 Jun 2025 05:28), Max: 98.5 (10 Nick 2025 21:38)  HR: 65 (11 Jun 2025 05:28) (63 - 78)  BP: 128/70 (11 Jun 2025 05:28) (100/60 - 128/70)  BP(mean): --  RR: 18 (11 Jun 2025 05:28) (17 - 18)  SpO2: 98% (11 Jun 2025 05:28) (93% - 98%)    Parameters below as of 11 Jun 2025 05:28  Patient On (Oxygen Delivery Method): nasal cannula  O2 Flow (L/min): 2        06-10-25 @ 07:01  -  06-11-25 @ 07:00  --------------------------------------------------------  IN: 0 mL / OUT: 600 mL / NET: -600 mL              LABS:                        7.1    5.92  )-----------( 263      ( 11 Jun 2025 06:45 )             22.6     06-11    141  |  107  |  52[H]  ----------------------------<  104[H]  5.1   |  28  |  1.30    Ca    7.6[L]      11 Jun 2025 06:45  Phos  3.8     06-10  Mg     2.0     06-10    TPro  5.7[L]  /  Alb  3.0[L]  /  TBili  0.6  /  DBili  x   /  AST  6[L]  /  ALT  27  /  AlkPhos  33[L]  06-11    PT/INR - ( 10 Nick 2025 06:40 )   PT: 14.2 sec;   INR: 1.22 ratio         PTT - ( 11 Jun 2025 03:30 )  PTT:26.8 sec  Urinalysis Basic - ( 11 Jun 2025 06:45 )    Color: x / Appearance: x / SG: x / pH: x  Gluc: 104 mg/dL / Ketone: x  / Bili: x / Urobili: x   Blood: x / Protein: x / Nitrite: x   Leuk Esterase: x / RBC: x / WBC x   Sq Epi: x / Non Sq Epi: x / Bacteria: x        ABG - ( 10 Nick 2025 17:30 )  pH, Arterial: 7.47  pH, Blood: x     /  pCO2: 45    /  pO2: 127   / HCO3: 33    / Base Excess: 9.1   /  SaO2: 99.1                WBC:  WBC Count: 5.92 K/uL (06-11 @ 06:45)  WBC Count: 7.40 K/uL (06-11 @ 03:30)  WBC Count: 9.87 K/uL (06-10 @ 20:29)  WBC Count: 9.24 K/uL (06-10 @ 06:40)  WBC Count: 24.78 K/uL (06-09 @ 05:48)  WBC Count: 42.66 K/uL (06-08 @ 19:35)  WBC Count: 32.33 K/uL (06-08 @ 11:20)      MICROBIOLOGY:  RECENT CULTURES:  06-08 Sputum Sputum XXXX   Few Squamous epithelial cells per low power field  Few polymorphonuclear leukocytes per low power field  Few Gram Positive Cocci in Pairs and Chains per oil power field   Commensal ginger consistent with body site    06-08 Clean Catch XXXX XXXX   <10,000 CFU/mL Normal Urogenital Ginger    06-08 Blood Blood-Peripheral XXXX XXXX   No growth at 48 Hours    06-08 Blood Blood-Peripheral XXXX XXXX   No growth at 48 Hours                PT/INR - ( 10 Nick 2025 06:40 )   PT: 14.2 sec;   INR: 1.22 ratio         PTT - ( 11 Jun 2025 03:30 )  PTT:26.8 sec    Sodium:  Sodium: 141 mmol/L (06-11 @ 06:45)  Sodium: 139 mmol/L (06-10 @ 06:40)  Sodium: 137 mmol/L (06-09 @ 05:48)  Sodium: 134 mmol/L (06-08 @ 19:35)  Sodium: 137 mmol/L (06-08 @ 11:20)      1.30 mg/dL 06-11 @ 06:45  1.30 mg/dL 06-10 @ 06:40  1.40 mg/dL 06-09 @ 05:48  2.00 mg/dL 06-08 @ 19:35  1.90 mg/dL 06-08 @ 11:20      Hemoglobin:  Hemoglobin: 7.1 g/dL (06-11 @ 06:45)  Hemoglobin: 7.6 g/dL (06-11 @ 03:30)  Hemoglobin: 7.4 g/dL (06-10 @ 20:29)  Hemoglobin: 7.6 g/dL (06-10 @ 06:40)  Hemoglobin: 7.8 g/dL (06-09 @ 05:48)  Hemoglobin: 8.7 g/dL (06-08 @ 19:35)  Hemoglobin: 7.5 g/dL (06-08 @ 11:20)      Platelets: Platelet Count - Automated: 263 K/uL (06-11 @ 06:45)  Platelet Count - Automated: 213 K/uL (06-11 @ 03:30)  Platelet Count - Automated: 260 K/uL (06-10 @ 20:29)  Platelet Count - Automated: 213 K/uL (06-10 @ 06:40)  Platelet Count - Automated: 190 K/uL (06-09 @ 05:48)  Platelet Count - Automated: 200 K/uL (06-08 @ 19:35)  Platelet Count - Automated: 153 K/uL (06-08 @ 11:20)      LIVER FUNCTIONS - ( 11 Jun 2025 06:45 )  Alb: 3.0 g/dL / Pro: 5.7 g/dL / ALK PHOS: 33 U/L / ALT: 27 U/L / AST: 6 U/L / GGT: x             Urinalysis Basic - ( 11 Jun 2025 06:45 )    Color: x / Appearance: x / SG: x / pH: x  Gluc: 104 mg/dL / Ketone: x  / Bili: x / Urobili: x   Blood: x / Protein: x / Nitrite: x   Leuk Esterase: x / RBC: x / WBC x   Sq Epi: x / Non Sq Epi: x / Bacteria: x        RADIOLOGY & ADDITIONAL STUDIES:      MICROBIOLOGY:  RECENT CULTURES:  06-08 Sputum Sputum XXXX   Few Squamous epithelial cells per low power field  Few polymorphonuclear leukocytes per low power field  Few Gram Positive Cocci in Pairs and Chains per oil power field   Commensal ginger consistent with body site    06-08 Clean Catch XXXX XXXX   <10,000 CFU/mL Normal Urogenital Ginger    06-08 Blood Blood-Peripheral XXXX XXXX   No growth at 48 Hours    06-08 Blood Blood-Peripheral XXXX XXXX   No growth at 48 Hours

## 2025-06-11 NOTE — PROGRESS NOTE ADULT - ASSESSMENT
84 yr old female with PMHx afib on eliquis, COPD (not on home O2), PE/Rt lower extremity DVT 2017, Rt LE IVC filter 2017 CKD3, aplastic anemia, polymyalgia rheumatica on prednisone 5 mg po qd, requiring PRBC transfusions ~8 weeks (via Rt subclavian mediport), AVN bilat hips, HTN, HLD, HFpEF (75% 6.2.25), diastolic dysfx grade1, recent hospitalization 5/25/25-6/5/25 for AHDF/COPD exacerbation, pt also with hx of IVC filter, had Rt subclavian mediport, who was admitted on 6/8 with c/o Rt sided chest pain. general malaise. Pt stated began to feel ill evening before presentation, daughter this a.m. endorsed pt lethargic, pale. Patient was initially admitted in the ICU for hypotension requiring pressors. She is transferred out of the ICU. She still reports some R sided chest pain and SOB. Reports cough which started when she arrived in the hospital. Has some blood tinged sputum.    ID consulted for pneumonia. CT chest showed new multifocal infection predominantly involving the right upper lobe. No fevers and no leukocytosis. Blood cultures remain no growth. COVID/FLU/RSV negative. MRSA nasal PCR, urine strep and legionella antigen negative. Respiratory culture growing commensal marilee consistent with body site.    #R sided pneumonia  #Leukocytosis    -continue Zosyn  -azithromycin discontinued  -check Quantiferon, serum Fungitell and galactomannan  -follow cultures to completion    Kimberly Velazco MD  Division of Infectious Diseases   Cell [phone] between 8am and 6pm   After 6pm and weekends please call ID service at [phone].     35 minutes spent on total encounter assessing patient, examination, chart review, counseling and coordinating care by the attending physician/nurse/care manager.

## 2025-06-11 NOTE — SOCIAL WORK PROGRESS NOTE - NSSWPROGRESSNOTE_GEN_ALL_CORE
Pt was discussed during rounds this AM; remains acute. Pt currently has echo pending. DC plan will be for pt to return back to [place] for [facility]. SW will continue to follow.

## 2025-06-11 NOTE — PROGRESS NOTE ADULT - SUBJECTIVE AND OBJECTIVE BOX
[facility]  INFECTIOUS DISEASES - [address], [place] [address]  Tel: [phone]     Fax: [phone]  =======================================================    MARTINEZ JONES [id number]    Follow up: No fevers. Breathing improved. Not really coughing much, but bringing up blood tinged sputum when she does.    Allergies:  No Known Allergies      Antibiotics:  albuterol/ipratropium for Nebulization 3 milliLiter(s) Nebulizer every 6 hours  aMIOdarone    Tablet 100 milliGRAM(s) Oral daily  chlorhexidine 2% Cloths 1 Application(s) Topical <User Schedule>  dextrose 5%. 1000 milliLiter(s) IV Continuous <Continuous>  dextrose 5%. 1000 milliLiter(s) IV Continuous <Continuous>  dextrose 50% Injectable 25 Gram(s) IV Push once  dextrose 50% Injectable 12.5 Gram(s) IV Push once  dextrose 50% Injectable 25 Gram(s) IV Push once  dextrose Oral Gel 15 Gram(s) Oral once PRN  droxidopa 100 milliGRAM(s) Oral every 8 hours  fluticasone propionate/ salmeterol 500-50 MICROgram(s) Diskus 1 Dose(s) Inhalation two times a day  furosemide   Injectable 20 milliGRAM(s) IV Push once PRN  gabapentin 400 milliGRAM(s) Oral every 12 hours  glucagon  Injectable 1 milliGRAM(s) IntraMuscular once  heparin   Injectable 5000 Unit(s) SubCutaneous every 12 hours  hydrocortisone sodium succinate Injectable 50 milliGRAM(s) IV Push every 12 hours  insulin lispro (ADMELOG) corrective regimen sliding scale   SubCutaneous three times a day before meals  insulin lispro (ADMELOG) corrective regimen sliding scale   SubCutaneous at bedtime  midodrine 10 milliGRAM(s) Oral every 8 hours  montelukast 10 milliGRAM(s) Oral daily  pantoprazole  Injectable 40 milliGRAM(s) IV Push daily  piperacillin/tazobactam IVPB.. 3.375 Gram(s) IV Intermittent every 8 hours  tiotropium 2.5 MICROgram(s) Inhaler 2 Puff(s) Inhalation daily       REVIEW OF SYSTEMS:  CONSTITUTIONAL:  No Fever or chills  HEENT:  No sore throat or runny nose.  CARDIOVASCULAR: + R sided chest pain  RESPIRATORY:  + cough, shortness of breath  GASTROINTESTINAL:  No nausea, vomiting or diarrhea.  GENITOURINARY:  No dysuria  MUSCULOSKELETAL:  no back pain  NEUROLOGIC:  No headache or dizziness  PSYCHIATRIC:  No disorder of thought or mood.       Physical Exam:  ICU Vital Signs Last 24 Hrs  T(C): 36.7 (11 Jun 2025 11:23), Max: 36.9 (10 Nick 2025 21:38)  T(F): 98.1 (11 Jun 2025 11:23), Max: 98.5 (10 Nick 2025 21:38)  HR: 62 (11 Jun 2025 11:23) (62 - 78)  BP: 106/63 (11 Jun 2025 11:23) (106/63 - 128/70)  BP(mean): --  ABP: --  ABP(mean): --  RR: 18 (11 Jun 2025 11:23) (18 - 18)  SpO2: 98% (11 Jun 2025 11:23) (93% - 98%)    O2 Parameters below as of 11 Jun 2025 11:23  Patient On (Oxygen Delivery Method): nasal cannula      GEN: NAD, sitting up in chair  HEENT: normocephalic and atraumatic.   NECK: Supple.   LUNGS: Normal respiratory effort  HEART: Regular rate and rhythm   ABDOMEN: Soft, nontender, and nondistended.    EXTREMITIES: No leg edema.  NEUROLOGIC: Answering questions appropriately  PSYCHIATRIC: Appropriate affect .      Labs:  06-11    141  |  107  |  52[H]  ----------------------------<  104[H]  5.1   |  28  |  1.30    Ca    7.6[L]      11 Jun 2025 06:45  Phos  3.8     06-10  Mg     2.0     06-10    TPro  5.7[L]  /  Alb  3.0[L]  /  TBili  0.6  /  DBili  x   /  AST  6[L]  /  ALT  27  /  AlkPhos  33[L]  06-11                          7.1    5.92  )-----------( 263      ( 11 Jun 2025 06:45 )             22.6     PT/INR - ( 10 Nick 2025 06:40 )   PT: 14.2 sec;   INR: 1.22 ratio         PTT - ( 11 Jun 2025 03:30 )  PTT:26.8 sec  Urinalysis Basic - ( 11 Jun 2025 06:45 )    Color: x / Appearance: x / SG: x / pH: x  Gluc: 104 mg/dL / Ketone: x  / Bili: x / Urobili: x   Blood: x / Protein: x / Nitrite: x   Leuk Esterase: x / RBC: x / WBC x   Sq Epi: x / Non Sq Epi: x / Bacteria: x      LIVER FUNCTIONS - ( 11 Jun 2025 06:45 )  Alb: 3.0 g/dL / Pro: 5.7 g/dL / ALK PHOS: 33 U/L / ALT: 27 U/L / AST: 6 U/L / GGT: x             RECENT CULTURES:  06-08 @ 18:25 Sputum Sputum     Commensal marilee consistent with body site    Few Squamous epithelial cells per low power field  Few polymorphonuclear leukocytes per low power field  Few Gram Positive Cocci in Pairs and Chains per oil power field      06-08 @ 15:44 Clean Catch     <10,000 CFU/mL Normal Urogenital Marilee        06-08 @ 11:20 Blood Blood-Peripheral     No growth at 48 Hours        06-08 @ 11:15 Blood Blood-Peripheral     No growth at 48 Hours              All imaging and data are reviewed.     < from: CT Chest No Cont (06.10.25 @ 19:43) >  FINDINGS:    Lungs/Airways/Pleura: Small right greater left pleural effusions. And   partial dependent right lower lobe atelectasis. New patchy and nodular   areas of consolidation in the right upper lobe with associated   groundglass opacity, likely infectious. Groundglass and nodular opacities   are also seen in the left upper lobe, to a lesser extent. Unchanged   numerous small tree-in-bud nodules in the right middle and right lower   lobe, likely impacted small airways.    Mediastinum/Lymph nodes: No thoracic adenopathy.    Heart and Vessels: Right chest wall port with tip terminating atthe   superior cavoatrial junction. Cardiomegaly. Unchanged small posterior   pericardial effusion. Coronary arterial, aortic valvular and mitral   annular calcification. Question coronary stenting. Enlarged pulmonary   artery (4.2 cm) which can be seen with pulmonary hypertension. 3.9 cm mid   ascending aorta.    Upper Abdomen: Left renal nodule. Thickened left adrenal gland.   Hemostatic clip in the stomach.    Osseous structures and Soft Tissues: Age-indeterminate T8 compression   deformity, unchanged since prior study.    IMPRESSION:  New multifocal infection predominantly involving the right upper lobe.    Unchanged small right greater left pleural effusions.    Enlarged pulmonary artery, suggesting pulmonary hypertension.    --- End of Report ---    < end of copied text >

## 2025-06-11 NOTE — PROGRESS NOTE ADULT - PROBLEM SELECTOR PLAN 4
-likely in the setting of sepsis   -on home full AC with Eliquis  -heparin sq ordered for now, further plans pending h/h stabilization  -plan per ICU -likely in the setting of sepsis   -on home full AC with Eliquis  -heparin sq ordered for now, further plans pending h/h stabilization

## 2025-06-11 NOTE — PROGRESS NOTE ADULT - PROBLEM SELECTOR PLAN 2
Suspect secondary to shock,   -continue midodrine 10 mg every 8 hours  -serial enzymes reviewed  -TTE performed 6/2 showing normal LV function with EF 71% and LV hypertrophy, normal RV size and function, moderate LA dilation   -no urgent need for TTE, will consider repeat  -cardio following - The patient is noted to have blood-tinged sputum  - requiring PRBC transfusions ~8 weeks (via Rt subclavian mediport)  - Hgb 7.6 (baseline appears to be ~8)   - transfuse <7-7.5  - Will hold Eliquis until clinical improvement is noted  - 6/11 1 unit pRBC, continue to hold eliquis

## 2025-06-11 NOTE — PROGRESS NOTE ADULT - ATTENDING COMMENTS
84F h/o AFib on Eliquis, COPD, CKD, aplastic anemia, polymyalgia rheumatica on chronic steroids, avascular necrosis of hips, HTN, HLD, with recent admission to [place] 5/26 - 6/5 for acute HFpEF exacerbation and COPD exacerbation, discharged to rehab on 6/5 and returning today with right sided chest pain, general malaise and feeling unwell. She reports some slight cough though otherwise no other new symptoms reported. Found to be hypotensive, febrile w/ leukocytosis. Admitted to ICU for septic shock likely 2/2 to PNA and KIMBERLY. Now downgraded to tele floors. Continue IV Zosyn. Legionella negative, off Azithromycin. check Quantiferon, serum Fungitell and galactomannan  Remains anemic - will transfuse 1 unit pRBC today, follow up AM CBC. Continue heparin sq for dvt ppx for now, will likely transition to gtt in AM after hemoglobin improves. Plan for Lasix 20mg IV x 1 with transfusion.   Continue solu-cortef BID - taper to discontinuation per pulm. Continue midodrine and notherna.  discussed with daughter Lenore aware and in agreement with above.

## 2025-06-11 NOTE — PROGRESS NOTE ADULT - SUBJECTIVE AND OBJECTIVE BOX
[facility]                                                       Dr. Chisholm, Dr. Franklin, Dr. Baron, Dr. Clark, Dr. Stock                                      [facility], [facility], [address]                                                 [address] [place] [address]                                      Ph: [phone]  Fax: [phone]                                         Ph: [phone]  Fax: [phone]      Patient is a 84y old  Female who presents with a chief complaint of septic shock, PNA (09 Jun 2025 12:36)  Patient seen in follow up for KIMBERLY on CKD>        PAST MEDICAL HISTORY:  COPD (chronic obstructive pulmonary disease)    DM (diabetes mellitus)    Pulmonary fibrosis    PAF (paroxysmal atrial fibrillation)    Hiatal hernia    GIB (gastrointestinal bleeding)    Pericarditis    Shoulder fracture, right    Hematoma    H/O aplastic anemia    History of IBS    H/O osteoporosis    HLD (hyperlipidemia)    PMR (polymyalgia rheumatica)    History of basal cell cancer    Chronic kidney disease (CKD)    Hypotension    Presence of IVC filter    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    History of immunodeficiency    Lumbar compression fracture    H/O hiatal hernia    H/O pulmonary fibrosis    H/O sinus bradycardia    History of fracture of right hip      MEDICATIONS  (STANDING):  albuterol/ipratropium for Nebulization 3 milliLiter(s) Nebulizer every 6 hours  aMIOdarone    Tablet 100 milliGRAM(s) Oral daily  chlorhexidine 2% Cloths 1 Application(s) Topical <User Schedule>  dextrose 5%. 1000 milliLiter(s) (50 mL/Hr) IV Continuous <Continuous>  dextrose 5%. 1000 milliLiter(s) (100 mL/Hr) IV Continuous <Continuous>  dextrose 50% Injectable 25 Gram(s) IV Push once  dextrose 50% Injectable 12.5 Gram(s) IV Push once  dextrose 50% Injectable 25 Gram(s) IV Push once  droxidopa 100 milliGRAM(s) Oral every 8 hours  fluticasone propionate/ salmeterol 500-50 MICROgram(s) Diskus 1 Dose(s) Inhalation two times a day  gabapentin 400 milliGRAM(s) Oral every 12 hours  glucagon  Injectable 1 milliGRAM(s) IntraMuscular once  heparin   Injectable 5000 Unit(s) SubCutaneous every 12 hours  hydrocortisone sodium succinate Injectable 50 milliGRAM(s) IV Push every 12 hours  insulin lispro (ADMELOG) corrective regimen sliding scale   SubCutaneous three times a day before meals  insulin lispro (ADMELOG) corrective regimen sliding scale   SubCutaneous at bedtime  midodrine 10 milliGRAM(s) Oral every 8 hours  montelukast 10 milliGRAM(s) Oral daily  pantoprazole  Injectable 40 milliGRAM(s) IV Push daily  piperacillin/tazobactam IVPB.. 3.375 Gram(s) IV Intermittent every 8 hours  tiotropium 2.5 MICROgram(s) Inhaler 2 Puff(s) Inhalation daily    MEDICATIONS  (PRN):  dextrose Oral Gel 15 Gram(s) Oral once PRN Blood Glucose LESS THAN 70 milliGRAM(s)/deciliter  furosemide   Injectable 20 milliGRAM(s) IV Push once PRN WITH BLOOD TRANSFUSION    T(C): 36.7 (06-11-25 @ 11:23), Max: 37.2 (06-09-25 @ 19:00)  HR: 62 (06-11-25 @ 11:23) (53 - 87)  BP: 106/63 (06-11-25 @ 11:23) (100/60 - 128/70)  RR: 18 (06-11-25 @ 11:23)  SpO2: 98% (06-11-25 @ 11:23)  Wt(kg): --  I&O's Detail    10 Nick 2025 07:01  -  11 Jun 2025 07:00  --------------------------------------------------------  IN:  Total IN: 0 mL    OUT:    Voided (mL): 600 mL  Total OUT: 600 mL    Total NET: -600 mL              PHYSICAL EXAM:  General: No distress  Respiratory: b/l air entry  Cardiovascular: S1 S2  Gastrointestinal: soft  Extremities:  edema                   LABORATORY:                        7.1    5.92  )-----------( 263      ( 11 Jun 2025 06:45 )             22.6     06-11    141  |  107  |  52[H]  ----------------------------<  104[H]  5.1   |  28  |  1.30    Ca    7.6[L]      11 Jun 2025 06:45  Phos  3.8     06-10  Mg     2.0     06-10    TPro  5.7[L]  /  Alb  3.0[L]  /  TBili  0.6  /  DBili  x   /  AST  6[L]  /  ALT  27  /  AlkPhos  33[L]  06-11    Sodium: 141 mmol/L (06-11 @ 06:45)  Sodium: 139 mmol/L (06-10 @ 06:40)    Potassium: 5.1 mmol/L (06-11 @ 06:45)  Potassium: 3.1 mmol/L (06-10 @ 06:40)    Hemoglobin: 7.1 g/dL (06-11 @ 06:45)  Hemoglobin: 7.6 g/dL (06-11 @ 03:30)  Hemoglobin: 7.4 g/dL (06-10 @ 20:29)  Hemoglobin: 7.6 g/dL (06-10 @ 06:40)    Creatinine, Serum 1.30 (06-11 @ 06:45)  Creatinine, Serum 1.30 (06-10 @ 06:40)  Creatinine, Serum 1.40 (06-09 @ 05:48)  Creatinine, Serum 2.00 (06-08 @ 19:35)        LIVER FUNCTIONS - ( 11 Jun 2025 06:45 )  Alb: 3.0 g/dL / Pro: 5.7 g/dL / ALK PHOS: 33 U/L / ALT: 27 U/L / AST: 6 U/L / GGT: x           Urinalysis Basic - ( 11 Jun 2025 06:45 )    Color: x / Appearance: x / SG: x / pH: x  Gluc: 104 mg/dL / Ketone: x  / Bili: x / Urobili: x   Blood: x / Protein: x / Nitrite: x   Leuk Esterase: x / RBC: x / WBC x   Sq Epi: x / Non Sq Epi: x / Bacteria: x      ABG - ( 10 Nick 2025 17:30 )  pH, Arterial: 7.47  pH, Blood: x     /  pCO2: 45    /  pO2: 127   / HCO3: 33    / Base Excess: 9.1   /  SaO2: 99.1

## 2025-06-11 NOTE — PROGRESS NOTE ADULT - ASSESSMENT
Prerenal azotemia, CKD 3  Dyspnea: Pneumonia, h/o COPD/CHF  Shock   Diabetes  Aplastic anemia, requiring frequent blood transfusions    06/08/25: Pressor support as needed. Hold diuretics. Optimize BP. ICU evalauation. Monitor blood sugar levels. Insulin coverage as needed. Check cultures, IV abx.   Monitor h/h trend. Transfuse PRBC's PRN. Avoid nephrotoxic meds as possible. Avoid ACEI, ARB, NSAIDs and IV contrast. Will follow electrolytes and renal function trend.   06/09/25: Improved renal indices. On pressor support. No objection to resume diuretics. Cardiology follow up. D/w pt's daughter at bedside. ICU management.   06/10/25: Improved renal indices. Potassium supplementation. To continue current meds. Cardiology follow up.   06/11/25: Improved and stable renal indices. To continue current meds. Avoid excessive PO fluids.

## 2025-06-11 NOTE — CHART NOTE - NSCHARTNOTEFT_GEN_A_CORE
Called by RN, patient complaining of right sided chest pain similar to prior; patient requesting tylenol. Called by RN, patient complaining of right sided chest pain similar to prior; patient requesting tylenol.  - Per RN, patient stating ofirmev this AM provided relief; no other acute complaints at this time  - VS stable  - Give ofirmev x1 now  - Day Team to adjust pain regimen as appropriate   - Rest of management per Primary Team   - RN to notify with any changes. Called by RN, patient complaining of 9/10 right-sided chest pain similar to prior; patient requesting tylenol.  - Per RN, patient stating ofirmev this AM provided relief; no other acute complaints at this time  - VS stable  - Pain reproducible on palpation  - Give ofirmev x1 now  - Day Team to adjust pain regimen as appropriate   - Rest of management per Primary Team   - RN to notify with any changes. Called by RN, patient complaining of 9/10 right-sided chest pain similar to prior; patient requesting tylenol.  - Per RN, patient stating ofirmev this AM provided relief; no other acute complaints at this time  - VS stable  - Pain reproducible on palpation  - Give ofirmev x1 now  - Day Team to adjust pain regimen as appropriate   - Rest of management per Primary Team   - RN to notify with any changes.      ADDENDUM AT 06:03:  - Called by RN, patient complaining of severe right-sided chest pain, similar to prior  - Patient requesting tylenol, declines lidocaine patch per RN  - Give ofirmev x1 now  - RN to notify with any changes.

## 2025-06-11 NOTE — PROGRESS NOTE ADULT - ASSESSMENT
84 female PMH of A-fib on Eliquis, COPD, CKD, aplastic anemia, polymyalgia rheumatica, on chronic steroids, avascular necrosis of hips, HLD, HTN, DM, recent admission to [facility] 5/26 through 6/5/2025 for CHF/fluid overload/COPD exacerbation, presents to the ED form [facility] for evaluation of fever and generalized feeling of being unwell, found to be septic and hypotensive.     Anemia/Atypical CP, PAfib, HTN  - With symptomatic anemia.  c/o dizziness with atypical CP.  Trops x 2 neg  - Hgb continues to downtrend, now at 7.1.  No clear evidence of bleeding  - Hold Heparin gtt (being given in lieu of home Eliquis)  - Transfuse per Primary    - No known Hx of CAD  - CP likely in the setting of significant anemia or pleuritic  - CxR shows a focal consolidation in the right lung.  Abx per Primary  - Initiate incentive spirometer  - EKG with no signs of acute ischemia    - Bp stable at systolic 100-120  - Has baseline dysautonomia with resultant severe orthostasis.    - s/p IV resuscitation and IV pressor  - Continue Midodrine and Northera    - ECHO 6/2/25:  LVEF 71%, LVH, and mod pHTN.  No other significant valvular disease  - Would hold her diuretic therapy at this time given that she appears dry on exam.    - Hx of paroxysmal atrial fibrillation and DVT/PE  - Would resume Heparin gtt as soon as safe to do so   - SB/SR on tele  - Continue home Amiodarone 100 mg daily    - Monitor and replete electrolytes. Keep K>4.0 and Mg>2.0.  - Will continue to follow    Shaneka Rogers DNP, NP-C, AGACNP-C  Cardiology   Call TEAMS

## 2025-06-11 NOTE — PROGRESS NOTE ADULT - PROBLEM SELECTOR PLAN 1
-in the ED, tmax of 101 w/ KIMBERLY on CKD with Cr 1.90 (baseline 1.2-1.6), Hypotensive with SBP 80's (pt on midodrine therapy, usual 's). also w/ leukocytosis of 32.33  - procalcitonin of 13.6, RVP neg    - Patient was downgraded from the ICU to telemetry on 6/10  - CXR: Dense infiltrate off the right upper hilum is presently seen  - CT of the chest is pending  - c/w azithromycin and zosyn, for now -infectious disease consult was requested  - The patient has been weaned off of vasopressors, monitor hemodynamics closely  - c/w solumedrol 50mg q6, plan to transition to p.o. steroids  - f/u legionella, strep pneumo   - MRSA/MSSA PCR negative  - trend WBC and fever curve -   - Supplemental O2 prn to maintain SPO2 > 92% In the ED, tmax of 101 w/ KIMBERLY on CKD with Cr 1.90 (baseline 1.2-1.6), Hypotensive with SBP 80's (pt on midodrine therapy, usual 's). also w/ leukocytosis of 32.33. Procalcitonin of 13.6, RVP neg. Pt treated in ICU for septic shock requiring pressors, downgraded from the ICU to telemetry on 6/10  - CXR: Dense infiltrate off the right upper hilum is presently seen  - CT chest 6/10 with multifocal R sided PNA  - continue zosyn, d/c azithro  - c/w solumedrol 50mg q6, plan to transition to p.o. steroids  - MRSA/MSSA PCR negative  - trend WBC and fever curve, supplemental O2 prn to maintain SPO2 > 92%  - ID consulted Dr Juan A varma appreciated

## 2025-06-11 NOTE — PROGRESS NOTE ADULT - SUBJECTIVE AND OBJECTIVE BOX
Patient is a 84y old  Female who presents with a chief complaint of septic shock, PNA (11 Jun 2025 08:58)      INTERVAL HPI/OVERNIGHT EVENTS: No acute overnight events. Pt was seen and examined at bedside. Pt states that she has some rib discomfort (relieved with tylenol) from her PNA but no cp, no sob.  Pt denies headache, dizziness, lightheadedness, fever, chills, body aches, CP, SOB, palpitations, abdominal pain, n/v, numbness/tingling.  No other complaints at this time.     MEDICATIONS  (STANDING):  albuterol/ipratropium for Nebulization 3 milliLiter(s) Nebulizer every 6 hours  aMIOdarone    Tablet 100 milliGRAM(s) Oral daily  azithromycin  IVPB      azithromycin  IVPB 500 milliGRAM(s) IV Intermittent every 24 hours  chlorhexidine 2% Cloths 1 Application(s) Topical <User Schedule>  dextrose 5%. 1000 milliLiter(s) (50 mL/Hr) IV Continuous <Continuous>  dextrose 5%. 1000 milliLiter(s) (100 mL/Hr) IV Continuous <Continuous>  dextrose 50% Injectable 25 Gram(s) IV Push once  dextrose 50% Injectable 12.5 Gram(s) IV Push once  dextrose 50% Injectable 25 Gram(s) IV Push once  droxidopa 100 milliGRAM(s) Oral every 8 hours  fluticasone propionate/ salmeterol 500-50 MICROgram(s) Diskus 1 Dose(s) Inhalation two times a day  gabapentin 400 milliGRAM(s) Oral every 12 hours  glucagon  Injectable 1 milliGRAM(s) IntraMuscular once  heparin   Injectable 5000 Unit(s) SubCutaneous every 12 hours  hydrocortisone sodium succinate Injectable 50 milliGRAM(s) IV Push every 12 hours  insulin lispro (ADMELOG) corrective regimen sliding scale   SubCutaneous three times a day before meals  insulin lispro (ADMELOG) corrective regimen sliding scale   SubCutaneous at bedtime  midodrine 10 milliGRAM(s) Oral every 8 hours  montelukast 10 milliGRAM(s) Oral daily  pantoprazole  Injectable 40 milliGRAM(s) IV Push daily  piperacillin/tazobactam IVPB.. 3.375 Gram(s) IV Intermittent every 8 hours  tiotropium 2.5 MICROgram(s) Inhaler 2 Puff(s) Inhalation daily    MEDICATIONS  (PRN):  dextrose Oral Gel 15 Gram(s) Oral once PRN Blood Glucose LESS THAN 70 milliGRAM(s)/deciliter      Allergies    No Known Allergies    Intolerances        REVIEW OF SYSTEMS:  CONSTITUTIONAL: No fever or chills  HEENT:  No headache, no sore throat  RESPIRATORY: No cough, wheezing, or shortness of breath  CARDIOVASCULAR: No chest pain, palpitations  GASTROINTESTINAL: No abd pain, nausea, vomiting, or diarrhea  GENITOURINARY: No dysuria, frequency, or hematuria  NEUROLOGICAL: no focal weakness or dizziness  MUSCULOSKELETAL: no myalgias     Vital Signs Last 24 Hrs  T(C): 36.9 (11 Jun 2025 05:28), Max: 36.9 (10 Nick 2025 21:38)  T(F): 98.5 (11 Jun 2025 05:28), Max: 98.5 (10 Nick 2025 21:38)  HR: 65 (11 Jun 2025 05:28) (63 - 78)  BP: 128/70 (11 Jun 2025 05:28) (100/60 - 128/70)  BP(mean): --  RR: 18 (11 Jun 2025 05:28) (17 - 18)  SpO2: 98% (11 Jun 2025 05:28) (93% - 98%)    Parameters below as of 11 Jun 2025 05:28  Patient On (Oxygen Delivery Method): nasal cannula  O2 Flow (L/min): 2      PHYSICAL EXAM:  GENERAL: NAD  HEENT:  anicteric, moist mucous membranes  CHEST/LUNG:  CTA b/l, no rales, wheezes, or rhonchi  HEART:  RRR, S1, S2  ABDOMEN:  BS+, soft, nontender, nondistended  EXTREMITIES: no edema, cyanosis, or calf tenderness  NERVOUS SYSTEM: answers questions and follows commands appropriately    LABS:                        7.1    5.92  )-----------( 263      ( 11 Jun 2025 06:45 )             22.6     CBC Full  -  ( 11 Jun 2025 06:45 )  WBC Count : 5.92 K/uL  Hemoglobin : 7.1 g/dL  Hematocrit : 22.6 %  Platelet Count - Automated : 263 K/uL  Mean Cell Volume : 97.0 fl  Mean Cell Hemoglobin : 30.5 pg  Mean Cell Hemoglobin Concentration : 31.4 g/dL  Auto Neutrophil # : x  Auto Lymphocyte # : x  Auto Monocyte # : x  Auto Eosinophil # : x  Auto Basophil # : x  Auto Neutrophil % : x  Auto Lymphocyte % : x  Auto Monocyte % : x  Auto Eosinophil % : x  Auto Basophil % : x    11 Jun 2025 06:45    141    |  107    |  52     ----------------------------<  104    5.1     |  28     |  1.30     Ca    7.6        11 Jun 2025 06:45    TPro  5.7    /  Alb  3.0    /  TBili  0.6    /  DBili  x      /  AST  6      /  ALT  27     /  AlkPhos  33     11 Jun 2025 06:45    PT/INR - ( 10 Nick 2025 06:40 )   PT: 14.2 sec;   INR: 1.22 ratio         PTT - ( 11 Jun 2025 03:30 )  PTT:26.8 sec  Urinalysis Basic - ( 11 Jun 2025 06:45 )    Color: x / Appearance: x / SG: x / pH: x  Gluc: 104 mg/dL / Ketone: x  / Bili: x / Urobili: x   Blood: x / Protein: x / Nitrite: x   Leuk Esterase: x / RBC: x / WBC x   Sq Epi: x / Non Sq Epi: x / Bacteria: x      CAPILLARY BLOOD GLUCOSE      POCT Blood Glucose.: 136 mg/dL (11 Jun 2025 08:09)  POCT Blood Glucose.: 140 mg/dL (10 Nick 2025 22:10)  POCT Blood Glucose.: 130 mg/dL (10 Nick 2025 17:15)  POCT Blood Glucose.: 228 mg/dL (10 Nick 2025 12:02)        Culture - Sputum (collected 06-08-25 @ 18:25)  Source: Sputum Sputum  Gram Stain (06-08-25 @ 22:12):    Few Squamous epithelial cells per low power field    Few polymorphonuclear leukocytes per low power field    Few Gram Positive Cocci in Pairs and Chains per oil power field  Final Report (06-10-25 @ 15:22):    Commensal ginger consistent with body site    Urinalysis with Rflx Culture (collected 06-08-25 @ 15:44)    Culture - Urine (collected 06-08-25 @ 15:44)  Source: Clean Catch  Final Report (06-09-25 @ 16:05):    <10,000 CFU/mL Normal Urogenital Ginger    Culture - Blood (collected 06-08-25 @ 11:20)  Source: Blood Blood-Peripheral  Preliminary Report (06-10-25 @ 14:01):    No growth at 48 Hours    Culture - Blood (collected 06-08-25 @ 11:15)  Source: Blood Blood-Peripheral  Preliminary Report (06-10-25 @ 14:01):    No growth at 48 Hours        RADIOLOGY & ADDITIONAL TESTS: ____    Personally reviewed.     Consultant(s) Notes Reviewed:  [x] YES  [ ] NO     Patient is a 84y old  Female who presents with a chief complaint of septic shock, PNA (11 Jun 2025 08:58)      INTERVAL HPI/OVERNIGHT EVENTS: No acute overnight events. Pt was seen and examined at bedside. Pt states that she has some rib discomfort (relieved with tylenol) from her PNA but no cp, no sob. VQ scan neg for PE.  Pt denies headache, dizziness, lightheadedness, fever, chills, body aches, CP, SOB, palpitations, abdominal pain, n/v, numbness/tingling.  No other complaints at this time.     MEDICATIONS  (STANDING):  albuterol/ipratropium for Nebulization 3 milliLiter(s) Nebulizer every 6 hours  aMIOdarone    Tablet 100 milliGRAM(s) Oral daily  azithromycin  IVPB      azithromycin  IVPB 500 milliGRAM(s) IV Intermittent every 24 hours  chlorhexidine 2% Cloths 1 Application(s) Topical <User Schedule>  dextrose 5%. 1000 milliLiter(s) (50 mL/Hr) IV Continuous <Continuous>  dextrose 5%. 1000 milliLiter(s) (100 mL/Hr) IV Continuous <Continuous>  dextrose 50% Injectable 25 Gram(s) IV Push once  dextrose 50% Injectable 12.5 Gram(s) IV Push once  dextrose 50% Injectable 25 Gram(s) IV Push once  droxidopa 100 milliGRAM(s) Oral every 8 hours  fluticasone propionate/ salmeterol 500-50 MICROgram(s) Diskus 1 Dose(s) Inhalation two times a day  gabapentin 400 milliGRAM(s) Oral every 12 hours  glucagon  Injectable 1 milliGRAM(s) IntraMuscular once  heparin   Injectable 5000 Unit(s) SubCutaneous every 12 hours  hydrocortisone sodium succinate Injectable 50 milliGRAM(s) IV Push every 12 hours  insulin lispro (ADMELOG) corrective regimen sliding scale   SubCutaneous three times a day before meals  insulin lispro (ADMELOG) corrective regimen sliding scale   SubCutaneous at bedtime  midodrine 10 milliGRAM(s) Oral every 8 hours  montelukast 10 milliGRAM(s) Oral daily  pantoprazole  Injectable 40 milliGRAM(s) IV Push daily  piperacillin/tazobactam IVPB.. 3.375 Gram(s) IV Intermittent every 8 hours  tiotropium 2.5 MICROgram(s) Inhaler 2 Puff(s) Inhalation daily    MEDICATIONS  (PRN):  dextrose Oral Gel 15 Gram(s) Oral once PRN Blood Glucose LESS THAN 70 milliGRAM(s)/deciliter      Allergies    No Known Allergies    Intolerances        REVIEW OF SYSTEMS:  CONSTITUTIONAL: No fever or chills  HEENT:  No headache, no sore throat  RESPIRATORY: No cough, wheezing, or shortness of breath  CARDIOVASCULAR: No chest pain, palpitations  GASTROINTESTINAL: No abd pain, nausea, vomiting, or diarrhea  GENITOURINARY: No dysuria, frequency, or hematuria  NEUROLOGICAL: no focal weakness or dizziness  MUSCULOSKELETAL: +rib pain    Vital Signs Last 24 Hrs  T(C): 36.9 (11 Jun 2025 05:28), Max: 36.9 (10 Nick 2025 21:38)  T(F): 98.5 (11 Jun 2025 05:28), Max: 98.5 (10 Nick 2025 21:38)  HR: 65 (11 Jun 2025 05:28) (63 - 78)  BP: 128/70 (11 Jun 2025 05:28) (100/60 - 128/70)  BP(mean): --  RR: 18 (11 Jun 2025 05:28) (17 - 18)  SpO2: 98% (11 Jun 2025 05:28) (93% - 98%)    Parameters below as of 11 Jun 2025 05:28  Patient On (Oxygen Delivery Method): nasal cannula  O2 Flow (L/min): 2      PHYSICAL EXAM:  GENERAL: NAD elderly  HEENT:  anicteric, moist mucous membranes  CHEST/LUNG:  diminished b/l, especially R side  HEART:  RRR, S1, S2  ABDOMEN:  BS+, soft, nontender, nondistended  EXTREMITIES: no edema, cyanosis, or calf tenderness  NERVOUS SYSTEM: answers questions and follows commands appropriately    LABS:                        7.1    5.92  )-----------( 263      ( 11 Jun 2025 06:45 )             22.6     CBC Full  -  ( 11 Jun 2025 06:45 )  WBC Count : 5.92 K/uL  Hemoglobin : 7.1 g/dL  Hematocrit : 22.6 %  Platelet Count - Automated : 263 K/uL  Mean Cell Volume : 97.0 fl  Mean Cell Hemoglobin : 30.5 pg  Mean Cell Hemoglobin Concentration : 31.4 g/dL  Auto Neutrophil # : x  Auto Lymphocyte # : x  Auto Monocyte # : x  Auto Eosinophil # : x  Auto Basophil # : x  Auto Neutrophil % : x  Auto Lymphocyte % : x  Auto Monocyte % : x  Auto Eosinophil % : x  Auto Basophil % : x    11 Jun 2025 06:45    141    |  107    |  52     ----------------------------<  104    5.1     |  28     |  1.30     Ca    7.6        11 Jun 2025 06:45    TPro  5.7    /  Alb  3.0    /  TBili  0.6    /  DBili  x      /  AST  6      /  ALT  27     /  AlkPhos  33     11 Jun 2025 06:45    PT/INR - ( 10 Nick 2025 06:40 )   PT: 14.2 sec;   INR: 1.22 ratio         PTT - ( 11 Jun 2025 03:30 )  PTT:26.8 sec  Urinalysis Basic - ( 11 Jun 2025 06:45 )    Color: x / Appearance: x / SG: x / pH: x  Gluc: 104 mg/dL / Ketone: x  / Bili: x / Urobili: x   Blood: x / Protein: x / Nitrite: x   Leuk Esterase: x / RBC: x / WBC x   Sq Epi: x / Non Sq Epi: x / Bacteria: x      CAPILLARY BLOOD GLUCOSE      POCT Blood Glucose.: 136 mg/dL (11 Jun 2025 08:09)  POCT Blood Glucose.: 140 mg/dL (10 Nick 2025 22:10)  POCT Blood Glucose.: 130 mg/dL (10 Nick 2025 17:15)  POCT Blood Glucose.: 228 mg/dL (10 Nick 2025 12:02)        Culture - Sputum (collected 06-08-25 @ 18:25)  Source: Sputum Sputum  Gram Stain (06-08-25 @ 22:12):    Few Squamous epithelial cells per low power field    Few polymorphonuclear leukocytes per low power field    Few Gram Positive Cocci in Pairs and Chains per oil power field  Final Report (06-10-25 @ 15:22):    Commensal ginger consistent with body site    Urinalysis with Rflx Culture (collected 06-08-25 @ 15:44)    Culture - Urine (collected 06-08-25 @ 15:44)  Source: Clean Catch  Final Report (06-09-25 @ 16:05):    <10,000 CFU/mL Normal Urogenital Ginger    Culture - Blood (collected 06-08-25 @ 11:20)  Source: Blood Blood-Peripheral  Preliminary Report (06-10-25 @ 14:01):    No growth at 48 Hours    Culture - Blood (collected 06-08-25 @ 11:15)  Source: Blood Blood-Peripheral  Preliminary Report (06-10-25 @ 14:01):    No growth at 48 Hours        RADIOLOGY & ADDITIONAL TESTS: ____    Personally reviewed.     Consultant(s) Notes Reviewed:  [x] YES  [ ] NO

## 2025-06-11 NOTE — PROGRESS NOTE ADULT - ASSESSMENT
84F h/o AFib on Eliquis, COPD, CKD, aplastic anemia, polymyalgia rheumatica on chronic steroids, avascular necrosis of hips, HTN, HLD, with recent admission to [facility] 5/26 - 6/5 for acute HFpEF exacerbation and COPD exacerbation, discharged to rehab on 6/5 and returning today with right sided chest pain, general malaise and feeling unwell. She reports some slight cough though otherwise no other new symptoms reported. Found to be hypotensive, febrile w/ leukocytosis. Admitted to ICU for septic shock likely 2/2 to PNA and KIMBERLY.    84F h/o AFib on Eliquis, COPD, CKD, aplastic anemia, polymyalgia rheumatica on chronic steroids, avascular necrosis of hips, HTN, HLD, with recent admission to [facility] 5/26 - 6/5 for acute HFpEF exacerbation and COPD exacerbation, discharged to rehab on 6/5 and returning today with right sided chest pain, general malaise and feeling unwell. She reports some slight cough though otherwise no other new symptoms reported. Found to be hypotensive, febrile w/ leukocytosis. Admitted to ICU for septic shock likely 2/2 to PNA and KIMBERLY.

## 2025-06-11 NOTE — PROGRESS NOTE ADULT - SUBJECTIVE AND OBJECTIVE BOX
[facility] -- Sai Valle, Girish Jackson Savella, , Rene Hernandez  Office # [phone]    Follow Up:      Subjective/Observations:     REVIEW OF SYSTEMS: All other review of systems is negative unless indicated above  PAST MEDICAL & SURGICAL HISTORY:  COPD (chronic obstructive pulmonary disease)    DM (diabetes mellitus)  diet-controlled      PAF (paroxysmal atrial fibrillation)  s/p ablation      Hiatal hernia      H/O aplastic anemia      History of IBS      H/O osteoporosis      HLD (hyperlipidemia)      PMR (polymyalgia rheumatica)      History of basal cell cancer      Chronic kidney disease (CKD)      Hypotension      Presence of IVC filter      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      History of immunodeficiency      Lumbar compression fracture      H/O hiatal hernia      H/O pulmonary fibrosis      H/O sinus bradycardia      History of fracture of right hip  "unoperable"      S/P hysterectomy      S/P foot surgery      S/P knee surgery      After cataract  bilateral eye cataract surgically removed      Closed right hip fracture  rigth hip ORIF july 19 2016      S/P IVC filter  nov 2016      S/P carpal tunnel release  Right 2008 & 6/27/17      H/O kyphoplasty      Port-A-Cath in place  right chest        MEDICATIONS  (STANDING):  albuterol/ipratropium for Nebulization 3 milliLiter(s) Nebulizer every 6 hours  aMIOdarone    Tablet 100 milliGRAM(s) Oral daily  azithromycin  IVPB      azithromycin  IVPB 500 milliGRAM(s) IV Intermittent every 24 hours  chlorhexidine 2% Cloths 1 Application(s) Topical <User Schedule>  dextrose 5%. 1000 milliLiter(s) (50 mL/Hr) IV Continuous <Continuous>  dextrose 5%. 1000 milliLiter(s) (100 mL/Hr) IV Continuous <Continuous>  dextrose 50% Injectable 25 Gram(s) IV Push once  dextrose 50% Injectable 12.5 Gram(s) IV Push once  dextrose 50% Injectable 25 Gram(s) IV Push once  droxidopa 100 milliGRAM(s) Oral every 8 hours  fluticasone propionate/ salmeterol 500-50 MICROgram(s) Diskus 1 Dose(s) Inhalation two times a day  gabapentin 400 milliGRAM(s) Oral every 12 hours  glucagon  Injectable 1 milliGRAM(s) IntraMuscular once  heparin   Injectable 5000 Unit(s) SubCutaneous every 12 hours  hydrocortisone sodium succinate Injectable 50 milliGRAM(s) IV Push every 12 hours  insulin lispro (ADMELOG) corrective regimen sliding scale   SubCutaneous three times a day before meals  insulin lispro (ADMELOG) corrective regimen sliding scale   SubCutaneous at bedtime  midodrine 10 milliGRAM(s) Oral every 8 hours  montelukast 10 milliGRAM(s) Oral daily  pantoprazole  Injectable 40 milliGRAM(s) IV Push daily  piperacillin/tazobactam IVPB.. 3.375 Gram(s) IV Intermittent every 8 hours  tiotropium 2.5 MICROgram(s) Inhaler 2 Puff(s) Inhalation daily    MEDICATIONS  (PRN):  dextrose Oral Gel 15 Gram(s) Oral once PRN Blood Glucose LESS THAN 70 milliGRAM(s)/deciliter    Allergies    No Known Allergies    Intolerances      Vital Signs Last 24 Hrs  T(C): 36.9 (11 Jun 2025 05:28), Max: 36.9 (10 Nick 2025 21:38)  T(F): 98.5 (11 Jun 2025 05:28), Max: 98.5 (10 Nick 2025 21:38)  HR: 65 (11 Jun 2025 05:28) (63 - 78)  BP: 128/70 (11 Jun 2025 05:28) (100/60 - 128/70)  BP(mean): --  RR: 18 (11 Jun 2025 05:28) (17 - 18)  SpO2: 98% (11 Jun 2025 05:28) (93% - 98%)    Parameters below as of 11 Jun 2025 05:28  Patient On (Oxygen Delivery Method): nasal cannula  O2 Flow (L/min): 2    I&O's Summary    10 Nick 2025 07:01  -  11 Jun 2025 07:00  --------------------------------------------------------  IN: 0 mL / OUT: 600 mL / NET: -600 mL        PHYSICAL EXAM:  TELE:   Constitutional: NAD, awake and alert, well-developed  HEENT: Moist Mucous Membranes, Anicteric  Pulmonary: Non-labored, breath sounds are clear bilaterally, No wheezing, rales or rhonchi  Cardiovascular: Regular, S1 and S2, No murmurs, rubs, gallops or clicks  Gastrointestinal: Bowel Sounds present, soft, nontender.   Lymph: No peripheral edema. No lymphadenopathy.  Skin: No visible rashes or ulcers.  Psych:  Mood & affect appropriate  LABS: All Labs Reviewed:                        7.6    7.40  )-----------( 213      ( 11 Jun 2025 03:30 )             24.2                         7.4    9.87  )-----------( 260      ( 10 Nick 2025 20:29 )             22.8                         7.6    9.24  )-----------( 213      ( 10 Nick 2025 06:40 )             23.2     10 Nick 2025 06:40    139    |  102    |  57     ----------------------------<  132    3.1     |  31     |  1.30   09 Jun 2025 05:48    137    |  100    |  62     ----------------------------<  144    3.8     |  27     |  1.40   08 Jun 2025 19:35    134    |  96     |  70     ----------------------------<  280    4.1     |  27     |  2.00     Ca    8.4        10 Nick 2025 06:40  Ca    8.2        09 Jun 2025 05:48  Ca    8.0        08 Jun 2025 19:35  Phos  3.8       10 Nick 2025 06:40  Phos  4.0       09 Jun 2025 05:48  Phos  4.4       08 Jun 2025 19:35  Mg     2.0       10 Nick 2025 06:40  Mg     1.8       09 Jun 2025 05:48  Mg     2.2       08 Jun 2025 19:35    TPro  5.7    /  Alb  2.9    /  TBili  0.7    /  DBili  x      /  AST  7      /  ALT  32     /  AlkPhos  39     10 Nick 2025 06:40  TPro  5.5    /  Alb  2.8    /  TBili  0.8    /  DBili  x      /  AST  9      /  ALT  32     /  AlkPhos  38     09 Jun 2025 05:48  TPro  5.9    /  Alb  3.1    /  TBili  2.0    /  DBili  x      /  AST  18     /  ALT  39     /  AlkPhos  46     08 Jun 2025 19:35  PT/INR - ( 10 Nick 2025 06:40 )   PT: 14.2 sec;   INR: 1.22 ratio      PTT - ( 11 Jun 2025 03:30 )  PTT:26.8 sec    12 Lead ECG:   Ventricular Rate 80 BPM    Atrial Rate 80 BPM    P-R Interval 132 ms    QRS Duration 86 ms    Q-T Interval 398 ms    QTC Calculation(Bazett) 459 ms    P Axis 75 degrees    R Axis -35 degrees    T Axis 78 degrees    Diagnosis Line Sinus rhythm with premature supraventricular complexes  Left axis deviation  Possible Lateral infarct , age undetermined  Abnormal ECG  When compared with ECG of 03-JUN-2025 22:27,  premature ventricular complexes are no longer present  Confirmed by ROSALVA GOODRICH ([id number]) on 6/8/2025 9:50:39 PM (06-08-25 @ 10:58)    TRANSTHORACIC ECHOCARDIOGRAM REPORT  ________________________________________________________________________________                                      _______  Pt. Name:       MARTINEZ JONES Study Date:    6/2/2025  MRN:            [id number]        YOB: 1940  Accession #:    [id number]       Age:           84 years  Account#:       [id number]      Gender:        F  Heart Rate:                     Height:        62.99 in (160.00 cm)  Rhythm:                         Weight:        154.32 lb (70.00 kg)  Blood Pressure: 91/55 mmHg      BSA/BMI:       1.73 m² / 27.34 kg/m²  ________________________________________________________________________________________  Referring Physician:    [id number] James Osei  Interpreting Physician: Daniel Jorge M.D.  Primary Sonographer:    Kelsey Monson RDCS    CPT:               ECHO TTE WO CON COMP W DOPP - 74104.m  Indication(s):     Cardiomyopathy, unspecified - I42.9  Procedure:         Transthoracic echocardiogram with 2-D, M-mode and complete                     spectral and color flow Doppler.  Ordering Location: [facility]  Admission Status:  Inpatient  ______________________________________________________________________________________     CONCLUSIONS:      1. Leftventricular systolic function is normal with an ejection fraction of 71 % by Arias's method of disks.   2. Normal right ventricular cavity size, with normal wall thickness, and normal right ventricular systolic function.   3. Left atrium is moderately dilated.   4. Mild mitral regurgitation.   5. Estimated pulmonary artery systolic pressure is 54 mmHg.   6. Mild pulmonic regurgitation.   7. Mild tricuspid regurgitation.   8. Small bilateral pleural effusion noted.   9. There is increased LV mass and concentric hypertrophy.  ______________________________________________________________________________________  FINDINGS:     Left Ventricle:  Left ventricular systolic function is normal with a calculated ejection fraction of 71 % by the Arias's biplane method of disks. Moderate left ventricular hypertrophy. There is increased LV mass and concentric hypertrophy.     Right Ventricle:  The right ventricular cavity is normal in size, with normal wall thickness and right ventricular systolic function is normal.     Left Atrium:  The left atrium is moderately dilated with an indexed volume of 44.98 ml/m².     Right Atrium:  The right atrium is normal in size with an indexed volume of 21.88 ml/m² and an indexed area of 8.72 cm²/m².     Aortic Valve:  The aortic valve is tricuspid with normal leaflet excursion. There is mild thickening of the aortic valve leaflets.     Mitral Valve:  Structurally normal mitral valve with normal leaflet excursion. There is calcification of the mitralvalve annulus. There is mild leaflet calcification. There is mild mitral regurgitation.     Tricuspid Valve:  The tricuspid valve is structurally normal with normal leaflet excursion. There is mild tricuspid regurgitation. Estimated pulmonary artery systolic pressure is 54 mmHg.     Pulmonic Valve:  Structurally normal pulmonic valve with normal leaflet excursion. There is mild pulmonic regurgitation.     Aorta:  The aortic root at the sinuses of Valsalva is normal in size, measuring 3.00 cm (indexed 1.73 cm/m²). The aortic arch diameter is normal in size, measuring 2.4 cm (indexed 1.39 cm/m²).     Pericardium:  There is a trace pericardial effusion.     Pleura:  Small bilateral pleural effusion noted.     Systemic Veins:  The inferior vena cava is normal in size measuring 1.77 cm in diameter, (normal <2.1cm) with abnormal inspiratory collapse (abnormal <50%) consistent with mildly elevated right atrial pressure (~8, range 5-10mmHg).  ____________________________________________________________________  QUANTITATIVE DATA:  Left Ventricle Measurements: (Indexed to BSA)     IVSd (2D):   1.3 cm  LVPWd (2D):  1.2 cm  LVIDd (2D):  4.5 cm  LVIDs (2D):  2.7 cm  LV Mass:     199 g  114.9 g/m²  LV Vol d, MOD A2C: 66.0 ml 38.11 ml/m²  LV Vol d,MOD A4C: 61.7 ml 35.63 ml/m²  LV Vol d, MOD BP:  63.9 ml 36.89 ml/m²  LV Vol s, MOD A2C: 19.4 ml 11.20 ml/m²  LV Vol s, MOD A4C: 16.4 ml 9.47 ml/m²  LV Vol s, MOD BP:  18.2 ml 10.52 ml/m²  LVOT SV MOD BP:    45.7 ml  LV EF% MOD BP:     71 %     MV E Vmax:    1.23 m/s  MV A Vmax:    0.68 m/s  MV E/A:       1.82  e' lateral:   7.51 cm/s  e' medial:    3.05 cm/s  E/e' lateral: 16.38  E/e' medial:  40.33  E/e' Average: 23.30  MV DT:        207 msec    Aorta Measurements: (Normal range) (Indexed to BSA)     Ao Root d 3.00 cm (2.7 - 3.3 cm) 1.73 cm/m²  Ao Arch:  2.4 cm    Left Atrium Measurements: (Indexed to BSA)  LA Diam 2D: 4.10 cm    Right Atrial Measurements:     RA Vol s, MOD A4C         37.9 ml  RA Vol s, MOD A4C i BSA   21.88 ml/m²  RA Area s, MOD A4C        15.1 cm²  RA Area s, MOD A4C, i BSA 8.72 cm²/m²    Mitral Valve Measurements:     MV E Vmax: 1.2 m/s         MR Vmax:          4.19 m/s  MV A Vmax: 0.7 m/s         MR Peak Gradient: 70.2 mmHg  MV E/A:    1.8    Tricuspid Valve Measurements:     TR Vmax:          3.4 m/s  TR Peak Gradient: 45.7 mmHg  RA Pressure:      8 mmHg  PASP:             54 mmHg    ________________________________________________________________________________________  Electronically signedon 6/2/2025 at 1:09:26 PM by Daniel Jorge M.D.         *** Final ***      [facility] -- Sai Valle, Manuel, [place], , Rene Hernandez  Office # [phone]    Follow Up:  Hypotension    Subjective/Observations: Sitting on the chair.  Admits to slight dizziness getting OOB.  NC at 2L/min in use.  States, breathing is better, though, with cough.  Denies orthopnea.  Denies CP or palpitations, though, c/o CP overnight.  Denies any form of bleeding.  No tele events    REVIEW OF SYSTEMS: All other review of systems is negative unless indicated above  PAST MEDICAL & SURGICAL HISTORY:  COPD (chronic obstructive pulmonary disease)    DM (diabetes mellitus)  diet-controlled      PAF (paroxysmal atrial fibrillation)  s/p ablation      Hiatal hernia      H/O aplastic anemia      History of IBS      H/O osteoporosis      HLD (hyperlipidemia)      PMR (polymyalgia rheumatica)      History of basal cell cancer      Chronic kidney disease (CKD)      Hypotension      Presence of IVC filter      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      History of immunodeficiency      Lumbar compression fracture      H/O hiatal hernia      H/O pulmonary fibrosis      H/O sinus bradycardia      History of fracture of right hip  "unoperable"      S/P hysterectomy      S/P foot surgery      S/P knee surgery      After cataract  bilateral eye cataract surgically removed      Closed right hip fracture  rigth hip ORIF july 19 2016      S/P IVC filter  nov 2016      S/P carpal tunnel release  Right 2008 & 6/27/17      H/O kyphoplasty      Port-A-Cath in place  right chest        MEDICATIONS  (STANDING):  albuterol/ipratropium for Nebulization 3 milliLiter(s) Nebulizer every 6 hours  aMIOdarone    Tablet 100 milliGRAM(s) Oral daily  azithromycin  IVPB      azithromycin  IVPB 500 milliGRAM(s) IV Intermittent every 24 hours  chlorhexidine 2% Cloths 1 Application(s) Topical <User Schedule>  dextrose 5%. 1000 milliLiter(s) (50 mL/Hr) IV Continuous <Continuous>  dextrose 5%. 1000 milliLiter(s) (100 mL/Hr) IV Continuous <Continuous>  dextrose 50% Injectable 25 Gram(s) IV Push once  dextrose 50% Injectable 12.5 Gram(s) IV Push once  dextrose 50% Injectable 25 Gram(s) IV Push once  droxidopa 100 milliGRAM(s) Oral every 8 hours  fluticasone propionate/ salmeterol 500-50 MICROgram(s) Diskus 1 Dose(s) Inhalation two times a day  gabapentin 400 milliGRAM(s) Oral every 12 hours  glucagon  Injectable 1 milliGRAM(s) IntraMuscular once  heparin   Injectable 5000 Unit(s) SubCutaneous every 12 hours  hydrocortisone sodium succinate Injectable 50 milliGRAM(s) IV Push every 12 hours  insulin lispro (ADMELOG) corrective regimen sliding scale   SubCutaneous three times a day before meals  insulin lispro (ADMELOG) corrective regimen sliding scale   SubCutaneous at bedtime  midodrine 10 milliGRAM(s) Oral every 8 hours  montelukast 10 milliGRAM(s) Oral daily  pantoprazole  Injectable 40 milliGRAM(s) IV Push daily  piperacillin/tazobactam IVPB.. 3.375 Gram(s) IV Intermittent every 8 hours  tiotropium 2.5 MICROgram(s) Inhaler 2 Puff(s) Inhalation daily    MEDICATIONS  (PRN):  dextrose Oral Gel 15 Gram(s) Oral once PRN Blood Glucose LESS THAN 70 milliGRAM(s)/deciliter    Allergies    No Known Allergies    Intolerances      Vital Signs Last 24 Hrs  T(C): 36.9 (11 Jun 2025 05:28), Max: 36.9 (10 Nick 2025 21:38)  T(F): 98.5 (11 Jun 2025 05:28), Max: 98.5 (10 Nick 2025 21:38)  HR: 65 (11 Jun 2025 05:28) (63 - 78)  BP: 128/70 (11 Jun 2025 05:28) (100/60 - 128/70)  BP(mean): --  RR: 18 (11 Jun 2025 05:28) (17 - 18)  SpO2: 98% (11 Jun 2025 05:28) (93% - 98%)    Parameters below as of 11 Jun 2025 05:28  Patient On (Oxygen Delivery Method): nasal cannula  O2 Flow (L/min): 2    I&O's Summary    10 Nick 2025 07:01  -  11 Jun 2025 07:00  --------------------------------------------------------  IN: 0 mL / OUT: 600 mL / NET: -600 mL        PHYSICAL EXAM:  TELE: SB/SR  Constitutional: NAD, awake and alert  HEENT: Moist Mucous Membranes, Anicteric  Pulmonary: Non-labored, breath sounds are clear bilaterally, No wheezing, rales or rhonchi  Cardiovascular: Regular, S1 and S2, No murmurs, rubs, gallops or clicks  Gastrointestinal: Bowel Sounds present, soft, nontender.   Lymph: 1+ edema. No lymphadenopathy.  Skin: No visible rashes or ulcers.  Psych:  Mood & affect appropriate  LABS: All Labs Reviewed:                        7.6    7.40  )-----------( 213      ( 11 Jun 2025 03:30 )             24.2                         7.4    9.87  )-----------( 260      ( 10 Nick 2025 20:29 )             22.8                         7.6    9.24  )-----------( 213      ( 10 Nick 2025 06:40 )             23.2     10 Nick 2025 06:40    139    |  102    |  57     ----------------------------<  132    3.1     |  31     |  1.30   09 Jun 2025 05:48    137    |  100    |  62     ----------------------------<  144    3.8     |  27     |  1.40   08 Jun 2025 19:35    134    |  96     |  70     ----------------------------<  280    4.1     |  27     |  2.00     Ca    8.4        10 Nick 2025 06:40  Ca    8.2        09 Jun 2025 05:48  Ca    8.0        08 Jun 2025 19:35  Phos  3.8       10 Nick 2025 06:40  Phos  4.0       09 Jun 2025 05:48  Phos  4.4       08 Jun 2025 19:35  Mg     2.0       10 Nick 2025 06:40  Mg     1.8       09 Jun 2025 05:48  Mg     2.2       08 Jun 2025 19:35    TPro  5.7    /  Alb  2.9    /  TBili  0.7    /  DBili  x      /  AST  7      /  ALT  32     /  AlkPhos  39     10 Nick 2025 06:40  TPro  5.5    /  Alb  2.8    /  TBili  0.8    /  DBili  x      /  AST  9      /  ALT  32     /  AlkPhos  38     09 Jun 2025 05:48  TPro  5.9    /  Alb  3.1    /  TBili  2.0    /  DBili  x      /  AST  18     /  ALT  39     /  AlkPhos  46     08 Jun 2025 19:35  PT/INR - ( 10 Nick 2025 06:40 )   PT: 14.2 sec;   INR: 1.22 ratio      PTT - ( 11 Jun 2025 03:30 )  PTT:26.8 sec    12 Lead ECG:   Ventricular Rate 80 BPM    Atrial Rate 80 BPM    P-R Interval 132 ms    QRS Duration 86 ms    Q-T Interval 398 ms    QTC Calculation(Bazett) 459 ms    P Axis 75 degrees    R Axis -35 degrees    T Axis 78 degrees    Diagnosis Line Sinus rhythm with premature supraventricular complexes  Left axis deviation  Possible Lateral infarct , age undetermined  Abnormal ECG  When compared with ECG of 03-JUN-2025 22:27,  premature ventricular complexes are no longer present  Confirmed by ROSALVA STOUT ([id number]) on 6/8/2025 9:50:39 PM (06-08-25 @ 10:58)    TRANSTHORACIC ECHOCARDIOGRAM REPORT  ________________________________________________________________________________                                      _______  Pt. Name:       MARTINEZ JONES Study Date:    6/2/2025  MRN:            [id number]        YOB: 1940  Accession #:    [id number]       Age:           84 years  Account#:       [id number]      Gender:        F  Heart Rate:                     Height:        62.99 in (160.00 cm)  Rhythm:                         Weight:        154.32 lb (70.00 kg)  Blood Pressure: 91/55 mmHg      BSA/BMI:       1.73 m² / 27.34 kg/m²  ________________________________________________________________________________________  Referring Physician:    [id number] James Osei  Interpreting Physician: Daniel Jorge M.D.  Primary Sonographer:    Kelsey Monson RDCS    CPT:               ECHO TTE WO CON COMP W DOPP - 33166.m  Indication(s):     Cardiomyopathy, unspecified - I42.9  Procedure:         Transthoracic echocardiogram with 2-D, M-mode and complete                     spectral and color flow Doppler.  Ordering Location: [facility]  Admission Status:  Inpatient  ______________________________________________________________________________________     CONCLUSIONS:      1. Leftventricular systolic function is normal with an ejection fraction of 71 % by Arias's method of disks.   2. Normal right ventricular cavity size, with normal wall thickness, and normal right ventricular systolic function.   3. Left atrium is moderately dilated.   4. Mild mitral regurgitation.   5. Estimated pulmonary artery systolic pressure is 54 mmHg.   6. Mild pulmonic regurgitation.   7. Mild tricuspid regurgitation.   8. Small bilateral pleural effusion noted.   9. There is increased LV mass and concentric hypertrophy.  ______________________________________________________________________________________  FINDINGS:     Left Ventricle:  Left ventricular systolic function is normal with a calculated ejection fraction of 71 % by the Arias's biplane method of disks. Moderate left ventricular hypertrophy. There is increased LV mass and concentric hypertrophy.     Right Ventricle:  The right ventricular cavity is normal in size, with normal wall thickness and right ventricular systolic function is normal.     Left Atrium:  The left atrium is moderately dilated with an indexed volume of 44.98 ml/m².     Right Atrium:  The right atrium is normal in size with an indexed volume of 21.88 ml/m² and an indexed area of 8.72 cm²/m².     Aortic Valve:  The aortic valve is tricuspid with normal leaflet excursion. There is mild thickening of the aortic valve leaflets.     Mitral Valve:  Structurally normal mitral valve with normal leaflet excursion. There is calcification of the mitralvalve annulus. There is mild leaflet calcification. There is mild mitral regurgitation.     Tricuspid Valve:  The tricuspid valve is structurally normal with normal leaflet excursion. There is mild tricuspid regurgitation. Estimated pulmonary artery systolic pressure is 54 mmHg.     Pulmonic Valve:  Structurally normal pulmonic valve with normal leaflet excursion. There is mild pulmonic regurgitation.     Aorta:  The aortic root at the sinuses of Valsalva is normal in size, measuring 3.00 cm (indexed 1.73 cm/m²). The aortic arch diameter is normal in size, measuring 2.4 cm (indexed 1.39 cm/m²).     Pericardium:  There is a trace pericardial effusion.     Pleura:  Small bilateral pleural effusion noted.     Systemic Veins:  The inferior vena cava is normal in size measuring 1.77 cm in diameter, (normal <2.1cm) with abnormal inspiratory collapse (abnormal <50%) consistent with mildly elevated right atrial pressure (~8, range 5-10mmHg).  ____________________________________________________________________  QUANTITATIVE DATA:  Left Ventricle Measurements: (Indexed to BSA)     IVSd (2D):   1.3 cm  LVPWd (2D):  1.2 cm  LVIDd (2D):  4.5 cm  LVIDs (2D):  2.7 cm  LV Mass:     199 g  114.9 g/m²  LV Vol d, MOD A2C: 66.0 ml 38.11 ml/m²  LV Vol d,MOD A4C: 61.7 ml 35.63 ml/m²  LV Vol d, MOD BP:  63.9 ml 36.89 ml/m²  LV Vol s, MOD A2C: 19.4 ml 11.20 ml/m²  LV Vol s, MOD A4C: 16.4 ml 9.47 ml/m²  LV Vol s, MOD BP:  18.2 ml 10.52 ml/m²  LVOT SV MOD BP:    45.7 ml  LV EF% MOD BP:     71 %     MV E Vmax:    1.23 m/s  MV A Vmax:    0.68 m/s  MV E/A:       1.82  e' lateral:   7.51 cm/s  e' medial:    3.05 cm/s  E/e' lateral: 16.38  E/e' medial:  40.33  E/e' Average: 23.30  MV DT:        207 msec    Aorta Measurements: (Normal range) (Indexed to BSA)     Ao Root d 3.00 cm (2.7 - 3.3 cm) 1.73 cm/m²  Ao Arch:  2.4 cm    Left Atrium Measurements: (Indexed to BSA)  LA Diam 2D: 4.10 cm    Right Atrial Measurements:     RA Vol s, MOD A4C         37.9 ml  RA Vol s, MOD A4C i BSA   21.88 ml/m²  RA Area s, MOD A4C        15.1 cm²  RA Area s, MOD A4C, i BSA 8.72 cm²/m²    Mitral Valve Measurements:     MV E Vmax: 1.2 m/s         MR Vmax:          4.19 m/s  MV A Vmax: 0.7 m/s         MR Peak Gradient: 70.2 mmHg  MV E/A:    1.8    Tricuspid Valve Measurements:     TR Vmax:          3.4 m/s  TR Peak Gradient: 45.7 mmHg  RA Pressure:      8 mmHg  PASP:             54 mmHg    ________________________________________________________________________________________  Electronically signedon 6/2/2025 at 1:09:26 PM by Daniel Jorge M.D.         *** Final ***

## 2025-06-11 NOTE — PROGRESS NOTE ADULT - SUBJECTIVE AND OBJECTIVE BOX
[INTERVAL HX: ]  Patient seen and examined;  Chart reviewed and events noted;     planned transfusion.   last transfusion 25    no CP, +cough with bloody sputum  VQ neg  s/p CT scan nc with multifocal pna    [MEDICATIONS]  MEDICATIONS  (STANDING):  albuterol/ipratropium for Nebulization 3 milliLiter(s) Nebulizer every 6 hours  aMIOdarone    Tablet 100 milliGRAM(s) Oral daily  chlorhexidine 2% Cloths 1 Application(s) Topical <User Schedule>  dextrose 5%. 1000 milliLiter(s) (50 mL/Hr) IV Continuous <Continuous>  dextrose 5%. 1000 milliLiter(s) (100 mL/Hr) IV Continuous <Continuous>  dextrose 50% Injectable 25 Gram(s) IV Push once  dextrose 50% Injectable 12.5 Gram(s) IV Push once  dextrose 50% Injectable 25 Gram(s) IV Push once  droxidopa 100 milliGRAM(s) Oral every 8 hours  fluticasone propionate/ salmeterol 500-50 MICROgram(s) Diskus 1 Dose(s) Inhalation two times a day  gabapentin 400 milliGRAM(s) Oral every 12 hours  glucagon  Injectable 1 milliGRAM(s) IntraMuscular once  heparin   Injectable 5000 Unit(s) SubCutaneous every 12 hours  hydrocortisone sodium succinate Injectable 50 milliGRAM(s) IV Push every 12 hours  insulin lispro (ADMELOG) corrective regimen sliding scale   SubCutaneous three times a day before meals  insulin lispro (ADMELOG) corrective regimen sliding scale   SubCutaneous at bedtime  midodrine 10 milliGRAM(s) Oral every 8 hours  montelukast 10 milliGRAM(s) Oral daily  pantoprazole  Injectable 40 milliGRAM(s) IV Push daily  piperacillin/tazobactam IVPB.. 3.375 Gram(s) IV Intermittent every 8 hours  tiotropium 2.5 MICROgram(s) Inhaler 2 Puff(s) Inhalation daily    MEDICATIONS  (PRN):  dextrose Oral Gel 15 Gram(s) Oral once PRN Blood Glucose LESS THAN 70 milliGRAM(s)/deciliter  furosemide   Injectable 20 milliGRAM(s) IV Push once PRN WITH BLOOD TRANSFUSION      [VITALS]  Vital Signs Last 24 Hrs  T(C): 36.7 (2025 11:23), Max: 36.9 (10 Nick 2025 21:38)  T(F): 98.1 (2025 11:23), Max: 98.5 (10 Nick 2025 21:38)  HR: 62 (2025 11:23) (62 - 78)  BP: 106/63 (2025 11:23) (106/63 - 128/70)  BP(mean): --  RR: 18 (2025 11:23) (18 - 18)  SpO2: 98% (2025 11:23) (93% - 98%)    Parameters below as of 2025 11:23  Patient On (Oxygen Delivery Method): nasal cannula      [WT/HT]  Daily     Daily Weight in k.2 (2025 05:28)  [VENT]      [PHYSICAL EXAM]  GEN: NAD  HEENT: normocephalic and atraumatic. EOMI. PERRL.    NECK: Supple.  No lymphadenopathy   LUNGS: Clear to auscultation.  HEART: Regular rate and rhythm,  no MRG  ABDOMEN: Soft, nontender, and nondistended.  Positive bowel sounds.    : No CVA tenderness  EXTREMITIES: Without edema.  NEUROLOGIC: grossly intact.  PSYCHIATRIC: Appropriate affect .  SKIN: No rash     [LABS:]                        7.1    5.92  )-----------( 263      ( 2025 06:45 )             22.6     06-11    141  |  107  |  52[H]  ----------------------------<  104[H]  5.1   |  28  |  1.30    Ca    7.6[L]      2025 06:45  Phos  3.8     06-10  Mg     2.0     06-10    TPro  5.7[L]  /  Alb  3.0[L]  /  TBili  0.6  /  DBili  x   /  AST  6[L]  /  ALT  27  /  AlkPhos  33[L]  06-11    PT/INR - ( 10 Nick 2025 06:40 )   PT: 14.2 sec;   INR: 1.22 ratio         PTT - ( 2025 03:30 )  PTT:26.8 sec      Sedimentation Rate, Erythrocyte: 17 mm/hr [0 - 20] (25 @ 11:20)    Folate, Serum: >20.0 ng/mL (25 @ 09:20)    Vitamin B12, Serum: 1444 pg/mL *H* [232 - 1245] (25 @ 09:20)    Iron - Total Binding Capacity.: Unable to calculate Test Repeated ug/dL [220 - 430] (23 @ 14:00)    Ferritin: 1342 ng/mL *H* [13 - 330] (23 @ 14:00)    Reticulocyte Count (23 @ 14:00)  Reticulocyte Percent: 2.0 % [0.5 - 2.5]  Absolute Reticulocytes: 39.0 K/uL [25.0 - 125.0]    Vitamin B12, Serum: 663 pg/mL [232 - 1245] (23 @ 14:00)    Folate, Serum: 16.2 ng/mL (23 @ 14:00)    Serum Protein Electrophoresis Interp: Normal Electrophoresis Pattern (23 @ 14:00)    Immunofixation, Serum:   No Monoclonal Band Identified  Reference Range: None Detected (23 @ 14:00)    Urinalysis Basic - ( 2025 06:45 )  Color: x / Appearance: x / SG: x / pH: x  Gluc: 104 mg/dL / Ketone: x  / Bili: x / Urobili: x   Blood: x / Protein: x / Nitrite: x   Leuk Esterase: x / RBC: x / WBC x   Sq Epi: x / Non Sq Epi: x / Bacteria: x        Culture - Sputum (collected 2025 18:25)  Source: Sputum Sputum  Gram Stain (2025 22:12):    Few Squamous epithelial cells per low power field    Few polymorphonuclear leukocytes per low power field    Few Gram Positive Cocci in Pairs and Chains per oil power field  Final Report (10 Nick 2025 15:22):    Commensal marilee consistent with body site      SARS-CoV-2: NotDetec (26 May 2025 18:00)    ABG - ( 10 Nick 2025 17:30 )  pH, Arterial: 7.47  pH, Blood: x     /  pCO2: 45    /  pO2: 127   / HCO3: 33    / Base Excess: 9.1   /  SaO2: 99.1                Culture - Sputum (collected 2025 18:25)  Source: Sputum Sputum  Gram Stain (2025 22:12):    Few Squamous epithelial cells per low power field    Few polymorphonuclear leukocytes per low power field    Few Gram Positive Cocci in Pairs and Chains per oil power field  Final Report (10 Nick 2025 15:22):    Commensal marilee consistent with body site        [RADIOLOGY STUDIES:]

## 2025-06-11 NOTE — PROGRESS NOTE ADULT - ASSESSMENT
REASON FOR VISIT  .. Management of problems listed below      EVENTS/CURRENT ISSUES.  . 6/11/2025 qft funfitell and galactomannan orderd   . 6/11/2025 pt wa sstarted on iv ufh for hemoptysis and pleuritic chest pain but was stopped when vq showed vlp   . 6/10/2025 Mild hemoptysis   . 6/9 tr out of icu  . 6/8/2025  . PNEUMONIA RML 6/8/2025  . SHOCK 6/8/2025   . NOREPI 6/8/2025   . STRESS STEROIDS   .... HYDROCORTISONE 6/8/2025  . A fib (chronic) POA 6/8/2025  . ANEMIA Hb 6/8/2025 Hb 7.5  . KIMBERLY ON CKD Cr 6/8/2025 Cr 1.9        REVIEW OF SYMPTOMS   Able to give ROS  Yes     RELIABILITY +/-   CONSTITUTIONAL Weakness Yes    ENDOCRINE  No heat or cold intolerance    ALLERGY No hives  Sore throat No stridor  RESP Shortness of breath YES   NEURO New weakness No   CARDIAC   Palpitations No         PHYSICAL EXAM    HEENT Unremarkable  atraumatic   RESP Fair air entry  Harsh breath sound   CARDIAC S1 S2 No S3     NO JVD    ABDOMEN No hepatosplenomegaly   PEDAL EDEMA present No calf tenderness    REASON FOR VISIT  .. Management of problems listed below      CC.   . 6/8/2025 brought in by ems for right sided chest pain that started at 3am- nonradiating- patient was offered aspirin by ems- patient refused and stated to ems that she is on plavix and was advised not to take aspirin. patient on 43 litres nasl cannula-     OVERALL PRESENTATION.  . 6/8/2025 84 yr old female with PMHx afib on eliquis, COPD (not on home O2), PE/Rt lower extremity DVT 2017, Rt LE IVC filter 2017 CKD3, aplastic anemia, polymyalgia rheumatica on prednisone 5 mg po qd, requiring PRBC transfusions ~8 weeks (via Rt subclavian mediport), AVN bilat hips, HTN, HLD, HFpEF (75% 6.2.25), diastolic dysfx grade1, recent hospitalization 5/25/25-6/5/25 for ADHF/COPD exacerbation, Pt with eventual improvement and transfer to [facility] facility from where she presents to [facility] this a.mLulu 6/8/25 with c/o Rt sided chest pain. general malaise. Pt stated began to feel ill evening before presentation, daughter this a.m. endorsed pt lethargic, pale.     In E.D. Pt found to have fever with tmax of 101, KIMBERLY on CKD with sCr 1.90 (baseline 1.2-1.6), HYPOtnsive with SBP 80's (pt on midodrine therapy, usual 's). In addition found to have leukocytosis of 37.6 (baseline 5.25 6/5/25), procalcitonin of 13.6, Hgb 7.5, elevated INR 2.27,  Chest xray demonstrated RML consolidations, concerning for pneum. In E.D. pt receiving 500 cc's NS, Vanco 1 gm, zosyn. Pt received tylenol 1 gm IV x1.     PMH.      BEST PRACTICE ISSUES.  . HOB ELEVATN.    .... Yes  . DIET  .   .... DASH 6/8/2025   . FREE WATER.   ....   .  IV FLUID.  ..... 6/8 lr 40 x 12 h   . PHARMAC DVT PPLX .    .... 6/11/2025 [facility] 5k.2   .... 6/10/2025 4:46 PM iv ufh   .... [facility] 6/9-6/10/2025   . NON PHARMAC DVT PPLX .      . STRESS ULCR PPLX .   ....   . DATE/DM MGMT.   ..... See under Endocrine section   GENERAL DATA .   . COVID.         .... scv2 6/8/2025 scv2 (-)   . GOC.    ....    . ICU STAY.    .... no   . INFECTION PPLX .   .... CHLORHEXIDINE 2% 6/8/2025   . ALLGY.   ....  nka  . WT.   .... 6/8/2025 69   . BMI.  ....6/8/2025 26      XXXXXXXXXXXXXXXXXXXXXXX  VITALS/GAS EXCHANGE/DRIPS    ABGS.     .  VS/ PO/IO/ VENT/ DRIPS.  6/11/2025 afeb 65 120/70   6/11/2025 2l 95%     XXXXXXXXXXXXXXXXXXXXXXXXXXXXXXXXXXX  PROBLEM ASSESSMENT RECOMMENDATIONS.  RESP.   . GAS EXCHANGE .   .... target PO 90-95%     . COPD   .... ADVAIR 500 6/8/2025   .... MONTELUKAST 10 6/8/2025   .... SPIRIVA 6/8/2025     . RESP FAILURE  .... 6/8/2025 Has ho hypercapnia   .... 6/8/2025 recommend monitor abg    . MILD HEMOPTYSIS 6/10/2025   .... 6/10/2025 check ct   .... 6/10/2025  dw hemat cardio id and instead of restarting apixa will start iv UFH which can be easily reversed in case Hb starts dropping but will be the rx for venous thromboembolism as well as for a fib     . PLEURITIC CHEST PAIN RO VTE   .... v duplx 6/10 (-)  .... vq 6/10 vlp       INFECTION.  . DATA  .... pr 6/8-6/10/2025 or 13.6 - 9.2   .... esr 6/8/2025 esr 17   .... w 6/8-6/9/2025 w 32 - 24   .... ua 6/8/2025 w 4   .... cxr 6/8/2025 cxr dense infiltra r upper hilum r mediport  .... ct ch 6/10 cw 5/31  ........ r greater than l effs   ........ partial rll atelectasis   ........ new patchy and nodular areas of consolidation rul and associated ground glass opacities   ........ ground glass and nodular opac also scott   ........ numerous tib rml and rll   .... flu ab 6/8/2025 (-)   .... rsv 6/8/2025 (-)    .... mrsa 6/9/2025 (-)     . PNEUMONIA RUL 6/8/2025   .... AZITHRO 6/8-6/11 /2025   .... ZOSYN 6/8/2025     CARDIAC.  . PAIN CHEST   .... 6/10/2025 co pain chest r   .... 6/10/2025 check ct to see if she has pleurisy   .... 6/10/2025  dw hemat cardio id and instead of restarting apixa will start iv UFH which can be easily reversed in case Hb starts dropping but will be the rx for venous thromboembolism as well as for a fib     . STRESS STEROIDS   .... HYDROCORTISONE   ........ 6/8/2025 h 50.4  ........ 6/10/2025 h 50.2     . SHOCK   .... MIDODRINE 6/8/2025 m 10.3   .... DROXIDOPA 6/9/2025 d 100.3   .... NOREPI 6/8/2025 n 8/250   .... target map 65 (+)     . CAD    .... Trop 6/8-6/9/2025 Tr 32- 24     . LACTICEMIA   .... la 6/8/2025 la 1.4     . CHF  .... pbnp 6/8/2025 pbnp 6599   .... tte 4/2025 mild ph  n vf  .... tte 6/2/2025   ........ ef 71%   ........ n rvsf    ........ pasp 54   ....... la mod dilatd     . A fib (chronic) POA 6/8/2025  .... AMIODARONE 100 6/8/2025  .... 6/10/2025  dw hemat cardio id and instead of restarting apixa will start iv UFH which can be easily reversed in case Hb starts dropping but will be the rx for venous thromboembolism as well as for a fib     HEMAT.  . DATA  .... Plt 6/8/2025 plt 153   .... inr 6/8-6/9/2025 inr 2.27- 1.63      . ANEMIA   .... Hb 6/8-6/9-6/10-6/11/2025 Hb 7.5- 7.8 - 7.6- 7.1     . TRANSFUSION  .... 6/8  1 u prbc      GI.   . DIET .   .... dash 6/8/2025     . LFT MONITORING   .... LFTS    6/8/2025  ........ AP     39  ........ AST   11  ........ ALT    35    RENAL.  . DATA  .... Na 6/8/2025 Na 137  .... K 6/8/2025 K 4   .... CO2 6/8/2025 co2 29   .... ag 6/8/2025 ag 9  .... alb 6/8/2025 alb 3.4     . KIMBERLY ON CKD   .... Cr 6/8-6/9-6/10-6/11/2025 Cr 1.9 - 1.4 - 1.3 - 1.3   .... Cr 5/27-5/28-5/29-5/30-6/1/2025   .... Cr 1.2 - 1.3 - 1.3 - 1.6 - 1.6    ENDO.  . DM  .  .... 6/8/2025 SL SCALE     NEURO.  . PAIN  .... GABAPENTIN 6/8/2025 g 400.2     XXXXXXXXXXXXXXXXXXXXXX   SUMMARY BASELINE .     84 yr old female pt of Dr Gallegos not on home ox or home cpap used to use trelegy lives with spouse quit 40 y 1/2 ppd with PMHx afib on eliquis, COPD (not on home O2), PE/Rt lower extremity DVT 2017, Rt LE IVC filter 2017 CKD3, aplastic anemia, polymyalgia rheumatica on prednisone 5 mg po qd, requiring PRBC transfusions around 8 weeks (via Rt subclavian mediport), AVN bilat hips, HTN, HLD, HFpEF (75% 6.2.25), diastolic dysfx grade1, recent hospitalization 5/25/25-6/5/25 for ADHF/COPD exacerbation, Pt with eventual improvement and transfer to [facility] facility   CC.   . 6/8/2025 R CHEST PAIN   MAIN ISSUES.  . COPD   . MILD HEMOPTYSOIS 6/10/2025   . PNEUMONIA RML 6/8/2025  . PLEURITIS CHEST PAIN 6/10/2025  .... 6/10 vte excluded vlp vq   . SHOCK 6/8/2025   .... resolvd tr oo icu 6/9   . NOREPI 6/8-6/9/2025   . STRESS STEROIDS   .... HYDROCORTISONE 6/8/2025  . A fib (chronic) POA 6/8/2025  .... 6/10-6/10/2025 IV UFH   . ANEMIA Hb 6/8/2025 Hb 7.5  . TRANSFUSION  .... 6/8  1 u prbc    . KIMBERLY ON CKD Cr 6/8/2025 Cr 1.9  PMH.   . AFon Eliquis,   . COPD (not on home o2),   . CKD,   . aplastic anemia,   . TRANSFUSION 6/2/2025 1 u prbc   . PMR (chronic prednisone with AVN hip),   . HLD,   . HTN,   . DM    PROCEDURES/DEVICES .  PAST PROCEDURES   . r mediport poa 6/8/2025     DISCUSSIONS.  .... Discussed with primary care and relevant consultants on an ongoing basis       TIME SPENT.  . Over 36 minutes aggregate care time spent on encounter; activities included   direct patient care, counseling and/or coordinating care reviewing notes, lab data/ imaging , discussion with multidisciplinary team/ patient  /family and explaining in detail risks, benefits, alternatives  of the recommendations     ROBERT HENRIQUEZ 84 6/8/2025 1942

## 2025-06-11 NOTE — CHART NOTE - NSCHARTNOTEFT_GEN_A_CORE
Called by RN, patient complaining of right sided chest pain similar to prior.   - Per RN, patient stating lidocaine patch yesterday did not provide significant relief; no other acute complaints at this time  - VS stable  - Give ofirmev x1 now  - Day Team to adjust pain regimen as appropriate   - Rest of management per Primary Team   - RN to notify with any changes. Called by RN, patient complaining of right sided chest pain similar to prior; patient requesting tylenol.   - Per RN, patient stating lidocaine patch yesterday did not provide significant relief; no other acute complaints at this time  - VS stable  - Give ofirmev x1 now  - Day Team to adjust pain regimen as appropriate   - Rest of management per Primary Team   - RN to notify with any changes.

## 2025-06-12 ENCOUNTER — RESULT REVIEW (OUTPATIENT)
Age: 85
End: 2025-06-12

## 2025-06-12 LAB
ALBUMIN SERPL ELPH-MCNC: 3.1 G/DL — LOW (ref 3.3–5)
ALP SERPL-CCNC: 37 U/L — LOW (ref 40–120)
ALT FLD-CCNC: 23 U/L — SIGNIFICANT CHANGE UP (ref 12–78)
ANION GAP SERPL CALC-SCNC: 7 MMOL/L — SIGNIFICANT CHANGE UP (ref 5–17)
APTT BLD: 74.1 SEC — HIGH (ref 26.1–36.8)
APTT BLD: 79.6 SEC — HIGH (ref 26.1–36.8)
AST SERPL-CCNC: <3 U/L — LOW (ref 15–37)
BASOPHILS # BLD AUTO: 0 K/UL — SIGNIFICANT CHANGE UP (ref 0–0.2)
BASOPHILS NFR BLD AUTO: 0 % — SIGNIFICANT CHANGE UP (ref 0–2)
BILIRUB SERPL-MCNC: 0.7 MG/DL — SIGNIFICANT CHANGE UP (ref 0.2–1.2)
BUN SERPL-MCNC: 49 MG/DL — HIGH (ref 7–23)
CALCIUM SERPL-MCNC: 8.6 MG/DL — SIGNIFICANT CHANGE UP (ref 8.5–10.1)
CHLORIDE SERPL-SCNC: 104 MMOL/L — SIGNIFICANT CHANGE UP (ref 96–108)
CO2 SERPL-SCNC: 28 MMOL/L — SIGNIFICANT CHANGE UP (ref 22–31)
CREAT SERPL-MCNC: 1.3 MG/DL — SIGNIFICANT CHANGE UP (ref 0.5–1.3)
EGFR: 41 ML/MIN/1.73M2 — LOW
EGFR: 41 ML/MIN/1.73M2 — LOW
EOSINOPHIL # BLD AUTO: 0 K/UL — SIGNIFICANT CHANGE UP (ref 0–0.5)
EOSINOPHIL NFR BLD AUTO: 0 % — SIGNIFICANT CHANGE UP (ref 0–6)
GLUCOSE SERPL-MCNC: 177 MG/DL — HIGH (ref 70–99)
HCT VFR BLD CALC: 27.2 % — LOW (ref 34.5–45)
HCT VFR BLD CALC: 27.7 % — LOW (ref 34.5–45)
HGB BLD-MCNC: 8.8 G/DL — LOW (ref 11.5–15.5)
HGB BLD-MCNC: 8.8 G/DL — LOW (ref 11.5–15.5)
LYMPHOCYTES # BLD AUTO: 0.44 K/UL — LOW (ref 1–3.3)
LYMPHOCYTES # BLD AUTO: 5 % — LOW (ref 13–44)
MAGNESIUM SERPL-MCNC: 1.7 MG/DL — SIGNIFICANT CHANGE UP (ref 1.6–2.6)
MCHC RBC-ENTMCNC: 30.3 PG — SIGNIFICANT CHANGE UP (ref 27–34)
MCHC RBC-ENTMCNC: 30.6 PG — SIGNIFICANT CHANGE UP (ref 27–34)
MCHC RBC-ENTMCNC: 31.8 G/DL — LOW (ref 32–36)
MCHC RBC-ENTMCNC: 32.4 G/DL — SIGNIFICANT CHANGE UP (ref 32–36)
MCV RBC AUTO: 94.4 FL — SIGNIFICANT CHANGE UP (ref 80–100)
MCV RBC AUTO: 95.5 FL — SIGNIFICANT CHANGE UP (ref 80–100)
MONOCYTES # BLD AUTO: 0.97 K/UL — HIGH (ref 0–0.9)
MONOCYTES NFR BLD AUTO: 11 % — SIGNIFICANT CHANGE UP (ref 2–14)
NEUTROPHILS # BLD AUTO: 7.23 K/UL — SIGNIFICANT CHANGE UP (ref 1.8–7.4)
NEUTROPHILS NFR BLD AUTO: 81 % — HIGH (ref 43–77)
NRBC BLD AUTO-RTO: 0 /100 WBCS — SIGNIFICANT CHANGE UP (ref 0–0)
NRBC BLD AUTO-RTO: SIGNIFICANT CHANGE UP /100 WBCS (ref 0–0)
PHOSPHATE SERPL-MCNC: 2 MG/DL — LOW (ref 2.5–4.5)
PLATELET # BLD AUTO: 271 K/UL — SIGNIFICANT CHANGE UP (ref 150–400)
PLATELET # BLD AUTO: 287 K/UL — SIGNIFICANT CHANGE UP (ref 150–400)
POTASSIUM SERPL-MCNC: 4.4 MMOL/L — SIGNIFICANT CHANGE UP (ref 3.5–5.3)
POTASSIUM SERPL-SCNC: 4.4 MMOL/L — SIGNIFICANT CHANGE UP (ref 3.5–5.3)
PROT SERPL-MCNC: 5.8 G/DL — LOW (ref 6–8.3)
RBC # BLD: 2.88 M/UL — LOW (ref 3.8–5.2)
RBC # BLD: 2.9 M/UL — LOW (ref 3.8–5.2)
RBC # FLD: 23.7 % — HIGH (ref 10.3–14.5)
RBC # FLD: 23.8 % — HIGH (ref 10.3–14.5)
SODIUM SERPL-SCNC: 139 MMOL/L — SIGNIFICANT CHANGE UP (ref 135–145)
WBC # BLD: 12.65 K/UL — HIGH (ref 3.8–10.5)
WBC # BLD: 8.82 K/UL — SIGNIFICANT CHANGE UP (ref 3.8–10.5)
WBC # FLD AUTO: 12.65 K/UL — HIGH (ref 3.8–10.5)
WBC # FLD AUTO: 8.82 K/UL — SIGNIFICANT CHANGE UP (ref 3.8–10.5)

## 2025-06-12 PROCEDURE — 99232 SBSQ HOSP IP/OBS MODERATE 35: CPT

## 2025-06-12 PROCEDURE — 93306 TTE W/DOPPLER COMPLETE: CPT | Mod: 26

## 2025-06-12 PROCEDURE — G0545: CPT

## 2025-06-12 PROCEDURE — 99233 SBSQ HOSP IP/OBS HIGH 50: CPT | Mod: GC

## 2025-06-12 RX ORDER — ACETAMINOPHEN 500 MG/5ML
650 LIQUID (ML) ORAL EVERY 6 HOURS
Refills: 0 | Status: DISCONTINUED | OUTPATIENT
Start: 2025-06-12 | End: 2025-06-16

## 2025-06-12 RX ORDER — HEPARIN SODIUM 1000 [USP'U]/ML
2500 INJECTION INTRAVENOUS; SUBCUTANEOUS EVERY 6 HOURS
Refills: 0 | Status: DISCONTINUED | OUTPATIENT
Start: 2025-06-12 | End: 2025-06-14

## 2025-06-12 RX ORDER — HEPARIN SODIUM 1000 [USP'U]/ML
5500 INJECTION INTRAVENOUS; SUBCUTANEOUS EVERY 6 HOURS
Refills: 0 | Status: DISCONTINUED | OUTPATIENT
Start: 2025-06-12 | End: 2025-06-14

## 2025-06-12 RX ORDER — HEPARIN SODIUM 1000 [USP'U]/ML
INJECTION INTRAVENOUS; SUBCUTANEOUS
Qty: 25000 | Refills: 0 | Status: DISCONTINUED | OUTPATIENT
Start: 2025-06-12 | End: 2025-06-14

## 2025-06-12 RX ORDER — ACETAMINOPHEN 500 MG/5ML
1000 LIQUID (ML) ORAL ONCE
Refills: 0 | Status: COMPLETED | OUTPATIENT
Start: 2025-06-12 | End: 2025-06-12

## 2025-06-12 RX ORDER — HYDROCORTISONE 20 MG
50 TABLET ORAL DAILY
Refills: 0 | Status: DISCONTINUED | OUTPATIENT
Start: 2025-06-12 | End: 2025-06-14

## 2025-06-12 RX ADMIN — TIOTROPIUM BROMIDE INHALATION SPRAY 2 PUFF(S): 3.12 SPRAY, METERED RESPIRATORY (INHALATION) at 06:36

## 2025-06-12 RX ADMIN — Medication 650 MILLIGRAM(S): at 18:42

## 2025-06-12 RX ADMIN — HEPARIN SODIUM 5000 UNIT(S): 1000 INJECTION INTRAVENOUS; SUBCUTANEOUS at 06:35

## 2025-06-12 RX ADMIN — Medication 40 MILLIGRAM(S): at 11:37

## 2025-06-12 RX ADMIN — IPRATROPIUM BROMIDE AND ALBUTEROL SULFATE 3 MILLILITER(S): .5; 2.5 SOLUTION RESPIRATORY (INHALATION) at 19:13

## 2025-06-12 RX ADMIN — IPRATROPIUM BROMIDE AND ALBUTEROL SULFATE 3 MILLILITER(S): .5; 2.5 SOLUTION RESPIRATORY (INHALATION) at 00:40

## 2025-06-12 RX ADMIN — HEPARIN SODIUM 1200 UNIT(S)/HR: 1000 INJECTION INTRAVENOUS; SUBCUTANEOUS at 23:57

## 2025-06-12 RX ADMIN — AMIODARONE HYDROCHLORIDE 100 MILLIGRAM(S): 50 INJECTION, SOLUTION INTRAVENOUS at 06:36

## 2025-06-12 RX ADMIN — Medication 50 MILLIGRAM(S): at 06:35

## 2025-06-12 RX ADMIN — Medication 1 DOSE(S): at 18:40

## 2025-06-12 RX ADMIN — Medication 400 MILLIGRAM(S): at 06:35

## 2025-06-12 RX ADMIN — Medication 650 MILLIGRAM(S): at 17:26

## 2025-06-12 RX ADMIN — DROXIDOPA 100 MILLIGRAM(S): 300 CAPSULE ORAL at 13:49

## 2025-06-12 RX ADMIN — Medication 25 GRAM(S): at 17:10

## 2025-06-12 RX ADMIN — MONTELUKAST SODIUM 10 MILLIGRAM(S): 10 TABLET ORAL at 11:37

## 2025-06-12 RX ADMIN — IPRATROPIUM BROMIDE AND ALBUTEROL SULFATE 3 MILLILITER(S): .5; 2.5 SOLUTION RESPIRATORY (INHALATION) at 07:13

## 2025-06-12 RX ADMIN — Medication 1000 MILLIGRAM(S): at 07:44

## 2025-06-12 RX ADMIN — Medication 1 DOSE(S): at 06:36

## 2025-06-12 RX ADMIN — Medication 25 GRAM(S): at 08:52

## 2025-06-12 RX ADMIN — HEPARIN SODIUM 1200 UNIT(S)/HR: 1000 INJECTION INTRAVENOUS; SUBCUTANEOUS at 17:56

## 2025-06-12 RX ADMIN — Medication 50 MILLIGRAM(S): at 17:10

## 2025-06-12 RX ADMIN — MIDODRINE HYDROCHLORIDE 10 MILLIGRAM(S): 5 TABLET ORAL at 13:49

## 2025-06-12 RX ADMIN — DROXIDOPA 100 MILLIGRAM(S): 300 CAPSULE ORAL at 21:45

## 2025-06-12 RX ADMIN — IPRATROPIUM BROMIDE AND ALBUTEROL SULFATE 3 MILLILITER(S): .5; 2.5 SOLUTION RESPIRATORY (INHALATION) at 13:54

## 2025-06-12 RX ADMIN — Medication 25 GRAM(S): at 00:05

## 2025-06-12 RX ADMIN — GABAPENTIN 400 MILLIGRAM(S): 400 CAPSULE ORAL at 06:34

## 2025-06-12 RX ADMIN — GABAPENTIN 400 MILLIGRAM(S): 400 CAPSULE ORAL at 17:10

## 2025-06-12 RX ADMIN — HEPARIN SODIUM 1200 UNIT(S)/HR: 1000 INJECTION INTRAVENOUS; SUBCUTANEOUS at 11:00

## 2025-06-12 RX ADMIN — Medication 25 GRAM(S): at 23:52

## 2025-06-12 RX ADMIN — Medication 1 APPLICATION(S): at 06:36

## 2025-06-12 NOTE — PROGRESS NOTE ADULT - ASSESSMENT
IMPRESSION  J18.8:     Other pneumonia, unspecified organism  A41.8:    Other specified sepsis  D46.7:    Other myelodysplastic syndromes  J44.0:     Chronic obstructive pulmonary disease with acute lower respiratory infection  I50          congestive heart failure  D63.8     Anemia chronic disease  D63.1     Anemia CKD  N18.31   CKD3a    Myelodysplasia who is transfusion and procrit dependent recently admitted with CHF and COPD exacerbation  Hgb declined during last admission s/p 1UPRBC 06/02   Hgb 7.6 increased to 8.5 post transfusion  Recently DC to [facility]  readmitted with pneumonia, sepsis to MICU.   transfered to medical unit 06/10/25      RECOMMENDATIONS    Pneumonia, multifocal with hemoptysis  on Zosyn and azithromycin  mgmt per ID    follow CBC and transfuse as indicated  continues on heparin. watch closely for bleeding    VTE ppx  hx of IVC filter    Supportive measures otherwise

## 2025-06-12 NOTE — DISCHARGE NOTE PROVIDER - NSDCMRMEDTOKEN_GEN_ALL_CORE_FT
amiodarone 200 mg oral tablet: 0.5 tab(s) orally once a day  Bentyl 10 mg oral capsule: 1 cap(s) orally 2 times a day, As Needed  Eliquis 2.5 mg oral tablet: 1 tab(s) orally 2 times a day  esomeprazole 20 mg oral delayed release capsule: 1 cap(s) orally once a day  fluticasone-salmeterol 500 mcg-50 mcg/inh inhalation powder: 1 puff(s) inhaled 2 times a day  folic acid 1 mg oral tablet: 1 tab(s) orally once a day (in the morning)  gabapentin 400 mg oral capsule: 1 cap(s) orally 2 times a day  ipratropium-albuterol 0.5 mg-2.5 mg/3 mL inhalation solution: 3 milliliter(s) inhaled every 6 hours As needed Shortness of Breath and/or Wheezing  IVIG monthly:   leflunomide 10 mg oral tablet: 1 tab(s) orally once a day  midodrine 10 mg oral tablet: 1 tab(s) orally every 8 hours  montelukast 10 mg oral tablet: 1 tab(s) orally once a day  pantoprazole 40 mg oral delayed release tablet: 1 tab(s) orally once a day (before a meal)  predniSONE 5 mg oral tablet: 1 tab(s) orally once a day  Procrit 10,000 units/mL preservative-free injectable solution: injectable once a week WEDNESDAY  Reclast Infusion , IV x 1 yearly:   simvastatin 10 mg oral tablet: 1 tab(s) orally once a day (at bedtime)  tiotropium 2.5 mcg/inh inhalation aerosol: 2 puff(s) inhaled once a day  tiZANidine: 2 tab(s) orally once a day (at bedtime) as needed for  muscle spasm  torsemide 20 mg oral tablet: 1 tab(s) orally once a day (in the morning)   amiodarone 200 mg oral tablet: 0.5 tab(s) orally once a day  atorvastatin 10 mg oral tablet: 1 tab(s) orally once a day (at bedtime)  Bentyl 10 mg oral capsule: 1 cap(s) orally 2 times a day, As Needed  Eliquis 2.5 mg oral tablet: 1 tab(s) orally 2 times a day  fluticasone-salmeterol 500 mcg-50 mcg/inh inhalation powder: 1 puff(s) inhaled 2 times a day  folic acid 1 mg oral tablet: 1 tab(s) orally once a day (in the morning)  gabapentin 400 mg oral capsule: 1 cap(s) orally 2 times a day  ipratropium-albuterol 0.5 mg-2.5 mg/3 mL inhalation solution: 3 milliliter(s) inhaled every 6 hours As needed Shortness of Breath and/or Wheezing  IVIG monthly:   leflunomide 10 mg oral tablet: 1 tab(s) orally once a day  midodrine 10 mg oral tablet: 1 tab(s) orally every 8 hours  montelukast 10 mg oral tablet: 1 tab(s) orally once a day  pantoprazole 40 mg oral delayed release tablet: 1 tab(s) orally once a day (before a meal)  predniSONE 5 mg oral tablet: 1 tab(s) orally once a day  Procrit 10,000 units/mL preservative-free injectable solution: injectable once a week WEDNESDAY  Reclast Infusion , IV x 1 yearly:   simvastatin 10 mg oral tablet: 1 tab(s) orally once a day (at bedtime)  tiotropium 2.5 mcg/inh inhalation aerosol: 2 puff(s) inhaled once a day  tiZANidine: 2 tab(s) orally once a day (at bedtime) as needed for  muscle spasm  torsemide 20 mg oral tablet: 1 tab(s) orally once a day (in the morning)   amiodarone 200 mg oral tablet: 0.5 tab(s) orally once a day  Bentyl 10 mg oral capsule: 1 cap(s) orally 2 times a day, As Needed  Eliquis 2.5 mg oral tablet: 1 tab(s) orally 2 times a day  fluticasone-salmeterol 500 mcg-50 mcg/inh inhalation powder: 1 puff(s) inhaled 2 times a day  folic acid 1 mg oral tablet: 1 tab(s) orally once a day (in the morning)  gabapentin 400 mg oral capsule: 1 cap(s) orally 2 times a day  ipratropium-albuterol 0.5 mg-2.5 mg/3 mL inhalation solution: 3 milliliter(s) inhaled every 6 hours As needed Shortness of Breath and/or Wheezing  IVIG monthly:   leflunomide 10 mg oral tablet: 1 tab(s) orally once a day  midodrine 10 mg oral tablet: 1 tab(s) orally every 8 hours  montelukast 10 mg oral tablet: 1 tab(s) orally once a day  pantoprazole 40 mg oral delayed release tablet: 1 tab(s) orally once a day (before a meal)  predniSONE 5 mg oral tablet: 1 tab(s) orally once a day  Procrit 10,000 units/mL preservative-free injectable solution: injectable once a week WEDNESDAY  Reclast Infusion , IV x 1 yearly:   simvastatin 10 mg oral tablet: 1 tab(s) orally once a day (at bedtime)  tiotropium 2.5 mcg/inh inhalation aerosol: 2 puff(s) inhaled once a day  tiZANidine: 2 tab(s) orally once a day (at bedtime) as needed for  muscle spasm  torsemide 20 mg oral tablet: 1 tab(s) orally once a day (in the morning)   amiodarone 200 mg oral tablet: 0.5 tab(s) orally once a day  Bentyl 10 mg oral capsule: 1 cap(s) orally 2 times a day, As Needed  Eliquis 2.5 mg oral tablet: 1 tab(s) orally 2 times a day  fluticasone-salmeterol 500 mcg-50 mcg/inh inhalation powder: 1 puff(s) inhaled 2 times a day  folic acid 1 mg oral tablet: 1 tab(s) orally once a day (in the morning)  gabapentin 400 mg oral capsule: 1 cap(s) orally 2 times a day  ipratropium-albuterol 0.5 mg-2.5 mg/3 mL inhalation solution: 3 milliliter(s) inhaled every 6 hours As needed Shortness of Breath and/or Wheezing  IVIG monthly:   leflunomide 10 mg oral tablet: 1 tab(s) orally once a day  midodrine 10 mg oral tablet: 1 tab(s) orally every 8 hours  montelukast 10 mg oral tablet: 1 tab(s) orally once a day  pantoprazole 40 mg oral delayed release tablet: 1 tab(s) orally once a day (before a meal)  predniSONE 5 mg oral tablet: 1 tab(s) orally once a day Please start taking 5mg from 7/22 back to your home dose  predniSONE 5 mg oral tablet: 2.5 tab(s) orally once a day Please start from 7/4 until 7/7  predniSONE 5 mg oral tablet: 2 tab(s) orally once a day please start taking 10mg for 7 days from 7/8 until 7/14  predniSONE 5 mg oral tablet: 1.5 tab(s) orally once a day please start taking 7.5mg from 7/15 until 7/21  Procrit 10,000 units/mL preservative-free injectable solution: injectable once a week WEDNESDAY  Reclast Infusion , IV x 1 yearly:   simvastatin 10 mg oral tablet: 1 tab(s) orally once a day (at bedtime)  tiotropium 2.5 mcg/inh inhalation aerosol: 2 puff(s) inhaled once a day  tiZANidine: 2 tab(s) orally once a day (at bedtime) as needed for  muscle spasm  torsemide 20 mg oral tablet: 1 tab(s) orally once a day (in the morning)   amiodarone 200 mg oral tablet: 0.5 tab(s) orally once a day  Bentyl 10 mg oral capsule: 1 cap(s) orally 2 times a day, As Needed  Eliquis 2.5 mg oral tablet: 1 tab(s) orally 2 times a day  fluticasone-salmeterol 500 mcg-50 mcg/inh inhalation powder: 1 puff(s) inhaled 2 times a day  folic acid 1 mg oral tablet: 1 tab(s) orally once a day (in the morning)  gabapentin 400 mg oral capsule: 1 cap(s) orally 2 times a day  ipratropium-albuterol 0.5 mg-2.5 mg/3 mL inhalation solution: 3 milliliter(s) inhaled every 6 hours As needed Shortness of Breath and/or Wheezing  IVIG monthly:   leflunomide 10 mg oral tablet: 1 tab(s) orally once a day  magnesium oxide 400 mg oral tablet: 1 tab(s) orally once a day  montelukast 10 mg oral tablet: 1 tab(s) orally once a day  pantoprazole 40 mg oral delayed release tablet: 1 tab(s) orally once a day (before a meal)  potassium phosphate-sodium phosphate 305 mg-700 mg oral tablet: 1 tab(s) orally every other day  prednisoLONE 5 mg oral tablet: 1.5 tab(s) orally once a day (at bedtime) to be given on 7/4/25 only  predniSONE 5 mg oral tablet: 1 tab(s) orally once a day Please start taking 5mg from 7/22 back to your home dose  predniSONE 5 mg oral tablet: 2 tab(s) orally once a day please start taking 10mg for 7 days from 7/8 until 7/14  predniSONE 5 mg oral tablet: 1.5 tab(s) orally once a day please start taking 7.5mg from 7/15 until 7/21  predniSONE 5 mg oral tablet: 2.5 tab(s) orally once a day Please start from 7/5 until 7/7  Procrit 10,000 units/mL preservative-free injectable solution: injectable once a week WEDNESDAY  Reclast Infusion , IV x 1 yearly:   simvastatin 10 mg oral tablet: 1 tab(s) orally once a day (at bedtime)  tiotropium 2.5 mcg/inh inhalation aerosol: 2 puff(s) inhaled once a day  tiZANidine: 2 tab(s) orally once a day (at bedtime) as needed for  muscle spasm  torsemide 20 mg oral tablet: 1 tab(s) orally once a day (in the morning)

## 2025-06-12 NOTE — DISCHARGE NOTE PROVIDER - HOSPITAL COURSE
HPI:  84 yr old female with PMHx afib on eliquis, COPD (not on home O2), PE/Rt lower extremity DVT 2017, Rt LE IVC filter 2017 CKD3, aplastic anemia, polymyalgia rheumatica on prednisone 5 mg po qd, requiring PRBC transfusions ~8 weeks (via Rt subclavian mediport), AVN bilat hips, HTN, HLD, HFpEF (75% 6.2.25), diastolic dysfx grade1, recent hospitalization 5/25/25-6/5/25 for AHDF/COPD exacerbation, pt also with hx of IVC filter, had Rt subclavian mediport. Pt with eventual improvement and transfer to [facility] facility from where she presents to E.D. today with c/o Rt sided chest pain. general malaise. Pt stated began to feel ill evening before presentation, daughter this a.m. endorsed pt lethargic, pale.     ED course  Vitals: 99.9 T , HR 71 , BP 82/48, RR 16 , SpO2 99% on 3L  Significant labs: WBC 32.33 Hgb 7.5 BUN 72 Cr 1.9 Mag 1.4 procal 13.61 pro-bnp 6599  RVP neg  Imaging: CXR: Dense infiltrate off the right upper hilum is presently seen  EKG: Sinus rhythm 80 bpm with premature supraventricular complexes Left axis deviation Possible Lateral infarct, age undetermined  In ED patient given: azithromycin x1, zosyn x1, vanco x1, IVF NS 500mL bolus x1, ofirmev x1, midodrine 10mg x1, 1U PRBC       (08 Jun 2025 15:50)      ---  HOSPITAL COURSE: Patient was admitted for septic shock requiring pressors hence sent to . CT chest showed multifocal R sided PNA. Patient was given zosyn IV antibiotics with improvement. Also was given IV steroids with transition to PO. Patient was also found to have aplastic anemia and was given ___ unit PRBC ; eliquis was also held. TTE showed normal LV function with EF 71% and LV hypertrophy, normal RV size and function, moderate LA dilation . Patient was started on midodrine for hypotension with improvement. Patient also had KIMBERLY on admission due to hypotension which improved as shock state improved. Patient medically improved throughout hospital course. PT was consulted who recommended ___. Patient was medically stable for discharge to __ with close outpatient follow up.     ---  CONSULTANTS:   Pulconcha retana)  heme onc (Manpreet)  ID (justin)  Nephro (slivia)  Cardio ([facility])     ---  TIME SPENT:  I, the attending physician, was physically present for the key portions of the evaluation and management (E/M) service provided. The total amount of time spent reviewing the hospital notes, laboratory values, imaging findings, assessing/counseling the patient, discussing with consultant physicians, social work, nursing staff was -- minutes    ---  Primary care provider was made aware of plan for discharge:      [  ] NO     [  ] YES   HPI:  84 yr old female with PMHx afib on eliquis, COPD (not on home O2), PE/Rt lower extremity DVT 2017, Rt LE IVC filter 2017 CKD3, aplastic anemia, polymyalgia rheumatica on prednisone 5 mg po qd, requiring PRBC transfusions ~8 weeks (via Rt subclavian mediport), AVN bilat hips, HTN, HLD, HFpEF (75% 6.2.25), diastolic dysfx grade1, recent hospitalization 5/25/25-6/5/25 for AHDF/COPD exacerbation, pt also with hx of IVC filter, had Rt subclavian mediport. Pt with eventual improvement and transfer to [facility] facility from where she presents to E.D. today with c/o Rt sided chest pain. general malaise. Pt stated began to feel ill evening before presentation, daughter this a.m. endorsed pt lethargic, pale.     ED course  Vitals: 99.9 T , HR 71 , BP 82/48, RR 16 , SpO2 99% on 3L  Significant labs: WBC 32.33 Hgb 7.5 BUN 72 Cr 1.9 Mag 1.4 procal 13.61 pro-bnp 6599  RVP neg  Imaging: CXR: Dense infiltrate off the right upper hilum is presently seen  EKG: Sinus rhythm 80 bpm with premature supraventricular complexes Left axis deviation Possible Lateral infarct, age undetermined  In ED patient given: azithromycin x1, zosyn x1, vanco x1, IVF NS 500mL bolus x1, ofirmev x1, midodrine 10mg x1, 1U PRBC       (08 Jun 2025 15:50)      ---  HOSPITAL COURSE: Patient was admitted for septic shock requiring pressors hence sent to . CT chest showed multifocal R sided PNA. Patient was given zosyn IV antibiotics with improvement. Also was given IV steroids with transition to PO. Patient was also found to have aplastic anemia and was given ___ unit PRBC ; eliquis was also held. TTE showed normal LV function with EF 71% and LV hypertrophy, normal RV size and function, moderate LA dilation . Patient was started on midodrine for hypotension with improvement. Patient also had KIMBERLY on admission due to hypotension which improved as shock state improved. Patient medically improved throughout hospital course. PT was consulted who recommended [facility]. Patient was medically stable for discharge to [facility] with close outpatient follow up.     ---  CONSULTANTS:   Pulm (holman)  heme onc (Manpreet)  ID (justin)  Nephro (slivia)  Cardio ([facility])     ---  TIME SPENT:  I, the attending physician, was physically present for the key portions of the evaluation and management (E/M) service provided. The total amount of time spent reviewing the hospital notes, laboratory values, imaging findings, assessing/counseling the patient, discussing with consultant physicians, social work, nursing staff was -- minutes    ---  Primary care provider was made aware of plan for discharge:      [  ] NO     [  ] YES   HPI:  84 yr old female with PMHx afib on eliquis, COPD (not on home O2), PE/Rt lower extremity DVT 2017, Rt LE IVC filter 2017 CKD3, aplastic anemia, polymyalgia rheumatica on prednisone 5 mg po qd, requiring PRBC transfusions ~8 weeks (via Rt subclavian mediport), AVN bilat hips, HTN, HLD, HFpEF (75% 6.2.25), diastolic dysfx grade1, recent hospitalization 5/25/25-6/5/25 for AHDF/COPD exacerbation, pt also with hx of IVC filter, had Rt subclavian mediport. Pt with eventual improvement and transfer to [facility] facility from where she presents to E.D. today with c/o Rt sided chest pain. general malaise. Pt stated began to feel ill evening before presentation, daughter this a.m. endorsed pt lethargic, pale.     ED course  Vitals: 99.9 T , HR 71 , BP 82/48, RR 16 , SpO2 99% on 3L  Significant labs: WBC 32.33 Hgb 7.5 BUN 72 Cr 1.9 Mag 1.4 procal 13.61 pro-bnp 6599  RVP neg  Imaging: CXR: Dense infiltrate off the right upper hilum is presently seen  EKG: Sinus rhythm 80 bpm with premature supraventricular complexes Left axis deviation Possible Lateral infarct, age undetermined  In ED patient given: azithromycin x1, zosyn x1, vanco x1, IVF NS 500mL bolus x1, ofirmev x1, midodrine 10mg x1, 1U PRBC       (08 Jun 2025 15:50)      ---  HOSPITAL COURSE: Patient was admitted for septic shock requiring pressors hence sent to . CT chest showed multifocal R sided PNA. Patient was given zosyn IV antibiotics with improvement. Also was given IV steroids with transition to PO. Patient was also found to have aplastic anemia and was given ___ unit PRBC ; eliquis was also held. Patient underwent bronchoscopy on ___ which showed ____.  TTE showed normal LV function with EF 71% and LV hypertrophy, normal RV size and function, moderate LA dilation . Patient was started on midodrine for hypotension with improvement. Patient also had KIMBERLY on admission due to hypotension which improved as shock state improved. Patient medically improved throughout hospital course. PT was consulted who recommended [facility] [place]. Patient was medically stable for discharge to [facility] [place] with close outpatient follow up.     ---  CONSULTANTS:   Pulm (manda)  heme onc (Manpreet)  ID (justin)  Nephro (silvia)  Cardio ([facility])     ---  TIME SPENT:  I, the attending physician, was physically present for the key portions of the evaluation and management (E/M) service provided. The total amount of time spent reviewing the hospital notes, laboratory values, imaging findings, assessing/counseling the patient, discussing with consultant physicians, social work, nursing staff was -- minutes    ---  Primary care provider was made aware of plan for discharge:      [  ] NO     [  ] YES   HPI:  84 yr old female with PMHx afib on eliquis, COPD (not on home O2), PE/Rt lower extremity DVT 2017, Rt LE IVC filter 2017 CKD3, aplastic anemia, polymyalgia rheumatica on prednisone 5 mg po qd, requiring PRBC transfusions ~8 weeks (via Rt subclavian mediport), AVN bilat hips, HTN, HLD, HFpEF (75% 6.2.25), diastolic dysfx grade1, recent hospitalization 5/25/25-6/5/25 for AHDF/COPD exacerbation, pt also with hx of IVC filter, had Rt subclavian mediport. Pt with eventual improvement and transfer to [facility] facility from where she presents to E.D. today with c/o Rt sided chest pain. general malaise. Pt stated began to feel ill evening before presentation, daughter this a.m. endorsed pt lethargic, pale.     ED course  Vitals: 99.9 T , HR 71 , BP 82/48, RR 16 , SpO2 99% on 3L  Significant labs: WBC 32.33 Hgb 7.5 BUN 72 Cr 1.9 Mag 1.4 procal 13.61 pro-bnp 6599  RVP neg  Imaging: CXR: Dense infiltrate off the right upper hilum is presently seen  EKG: Sinus rhythm 80 bpm with premature supraventricular complexes Left axis deviation Possible Lateral infarct, age undetermined  In ED patient given: azithromycin x1, zosyn x1, vanco x1, IVF NS 500mL bolus x1, ofirmev x1, midodrine 10mg x1, 1U PRBC       (08 Jun 2025 15:50)      ---  HOSPITAL COURSE: Patient was admitted for septic shock requiring pressors hence sent to ICU. CT chest showed multifocal R sided PNA. Patient was given zosyn IV antibiotics with improvement. Also was given IV steroids with transition to PO. Patient was also found to have aplastic anemia and was given ___ unit PRBC ; eliquis was also held. Patient underwent bronchoscopy for hemoptysis on 6/16 which showed ____.  TTE showed normal LV function with EF 71% and LV hypertrophy, normal RV size and function, moderate LA dilation . Patient was started on midodrine for hypotension with improvement. Patient also had KIMBERLY on admission due to hypotension which improved as shock state improved. Patient medically improved throughout hospital course. PT was consulted who recommended [facility]. Patient was medically stable for discharge to [facility] with close outpatient follow up.     ---  CONSULTANTS:   Pulm (manda)  heme onc (Manpreet)  ID (justin)  Nephro (silvia)  Cardio ([facility])     ---  TIME SPENT:  I, the attending physician, was physically present for the key portions of the evaluation and management (E/M) service provided. The total amount of time spent reviewing the hospital notes, laboratory values, imaging findings, assessing/counseling the patient, discussing with consultant physicians, social work, nursing staff was -- minutes    ---  Primary care provider was made aware of plan for discharge:      [  ] NO     [  ] YES   HPI:  84 yr old female with PMHx afib on eliquis, COPD (not on home O2), PE/Rt lower extremity DVT 2017, Rt LE IVC filter 2017 CKD3, aplastic anemia, polymyalgia rheumatica on prednisone 5 mg po qd, requiring PRBC transfusions ~8 weeks (via Rt subclavian mediport), AVN bilat hips, HTN, HLD, HFpEF (75% 6.2.25), diastolic dysfx grade1, recent hospitalization 5/25/25-6/5/25 for AHDF/COPD exacerbation, pt also with hx of IVC filter, had Rt subclavian mediport. Pt with eventual improvement and transfer to [facility] facility from where she presents to E.D. today with c/o Rt sided chest pain. general malaise. Pt stated began to feel ill evening before presentation, daughter this a.m. endorsed pt lethargic, pale.     ED course  Vitals: 99.9 T , HR 71 , BP 82/48, RR 16 , SpO2 99% on 3L  Significant labs: WBC 32.33 Hgb 7.5 BUN 72 Cr 1.9 Mag 1.4 procal 13.61 pro-bnp 6599  RVP neg  Imaging: CXR: Dense infiltrate off the right upper hilum is presently seen  EKG: Sinus rhythm 80 bpm with premature supraventricular complexes Left axis deviation Possible Lateral infarct, age undetermined  In ED patient given: azithromycin x1, zosyn x1, vanco x1, IVF NS 500mL bolus x1, ofirmev x1, midodrine 10mg x1, 1U PRBC       (08 Jun 2025 15:50)      ---  HOSPITAL COURSE: Patient was admitted for septic shock requiring pressors hence sent to ICU. CT chest showed multifocal R sided PNA. Patient was given zosyn IV antibiotics with improvement. Also was given IV steroids with transition to PO. Patient was also found to have aplastic anemia and was given 5 units PRBC over the course of hospitalization ; eliquis was also held. Patient underwent bronchoscopy for hemoptysis on 6/16 which showed pooling of blood in the right upper lobe but no notable lesions. TTE showed normal LV function with EF 71% and LV hypertrophy, normal RV size and function, moderate LA dilation . Patient was started on midodrine for hypotension with improvement. Patient also had KIMBERLY on admission due to hypotension which improved as shock state improved. Patient medically improved throughout hospital course. PT was consulted who recommended [facility]. Patient was medically stable for discharge to [facility] with close outpatient follow up.     PE and vitals on the day of discharge:     ---  CONSULTANTS:   Pulm (manda)  heme onc (Manpreet)  ID (justin)  Nephro (silvia)  Cardio ([facility])     ---  TIME SPENT:  I, the attending physician, was physically present for the key portions of the evaluation and management (E/M) service provided. The total amount of time spent reviewing the hospital notes, laboratory values, imaging findings, assessing/counseling the patient, discussing with consultant physicians, social work, nursing staff was -- minutes    ---  Primary care provider was made aware of plan for discharge:      [  ] NO     [  ] YES   HPI:  84 yr old female with PMHx afib on eliquis, COPD (not on home O2), PE/Rt lower extremity DVT 2017, Rt LE IVC filter 2017 CKD3, aplastic anemia, polymyalgia rheumatica on prednisone 5 mg po qd, requiring PRBC transfusions ~8 weeks (via Rt subclavian mediport), AVN bilat hips, HTN, HLD, HFpEF (75% 6.2.25), diastolic dysfx grade1, recent hospitalization 5/25/25-6/5/25 for AHDF/COPD exacerbation, pt also with hx of IVC filter, had Rt subclavian mediport. Pt with eventual improvement and transfer to [facility] facility from where she presents to E.D. today with c/o Rt sided chest pain. general malaise. Pt stated began to feel ill evening before presentation, daughter this a.m. endorsed pt lethargic, pale.     ED course  Vitals: 99.9 T , HR 71 , BP 82/48, RR 16 , SpO2 99% on 3L  Significant labs: WBC 32.33 Hgb 7.5 BUN 72 Cr 1.9 Mag 1.4 procal 13.61 pro-bnp 6599  RVP neg  Imaging: CXR: Dense infiltrate off the right upper hilum is presently seen  EKG: Sinus rhythm 80 bpm with premature supraventricular complexes Left axis deviation Possible Lateral infarct, age undetermined  In ED patient given: azithromycin x1, zosyn x1, vanco x1, IVF NS 500mL bolus x1, ofirmev x1, midodrine 10mg x1, 1U PRBC       (08 Jun 2025 15:50)      ---  HOSPITAL COURSE: Patient was admitted for septic shock requiring pressors hence sent to ICU. CT chest showed multifocal R sided PNA. Patient was given zosyn IV antibiotics with improvement. Also was given IV steroids with transition to PO. Patient was also found to have aplastic anemia and was given 5 units PRBC over the course of hospitalization ; eliquis was also held. Patient underwent bronchoscopy for hemoptysis on 6/16 which showed pooling of blood in the right upper lobe but no notable lesions. TTE showed normal LV function with EF 71% and LV hypertrophy, normal RV size and function, moderate LA dilation . Patient was started on midodrine for hypotension with improvement. Patient also had KIMBERLY on admission due to hypotension which improved as shock state improved. Patient medically improved throughout hospital course. PT was consulted who recommended [facility]. Patient was medically stable for discharge to [facility] with close outpatient follow up.     ICU course: Rapid response called on 6/23 when pt was found to be obtunded. As per daughter, pt had been declining Bipap. ABG at the time was 7.31/82/153/41. Pt started on Bipap and transferred to ICU. Pt was requiring Precedex to tolerate Bipap and started on stress dose solucortef. Pt status improved overnight and was weaned off precedex. Pt was switched to NC during the day and continued on Bipap at night.   Pt received 1unit pRBCs for hx of aplastic anemia and PMR. Pt continued on Zosyn for multifocal PNA. Azithromycin was d/c.     PE and vitals on the day of discharge:     ---  CONSULTANTS:   Pulm lainey)  heme onc (Manpreet)  ID (justin)  Nephro (silvia)  Cardio ([facility])     ---  TIME SPENT:  I, the attending physician, was physically present for the key portions of the evaluation and management (E/M) service provided. The total amount of time spent reviewing the hospital notes, laboratory values, imaging findings, assessing/counseling the patient, discussing with consultant physicians, social work, nursing staff was -- minutes    ---  Primary care provider was made aware of plan for discharge:      [  ] NO     [  ] YES   HPI:  84 yr old female with PMHx afib on eliquis, COPD (not on home O2), PE/Rt lower extremity DVT 2017, Rt LE IVC filter 2017 CKD3, aplastic anemia, polymyalgia rheumatica on prednisone 5 mg po qd, requiring PRBC transfusions ~8 weeks (via Rt subclavian mediport), AVN bilat hips, HTN, HLD, HFpEF (75% 6.2.25), diastolic dysfx grade1, recent hospitalization 5/25/25-6/5/25 for AHDF/COPD exacerbation, pt also with hx of IVC filter, had Rt subclavian mediport. Pt with eventual improvement and transfer to [facility] facility from where she presents to E.D. today with c/o Rt sided chest pain. general malaise. Pt stated began to feel ill evening before presentation, daughter this a.m. endorsed pt lethargic, pale.     ED course  Vitals: 99.9 T , HR 71 , BP 82/48, RR 16 , SpO2 99% on 3L  Significant labs: WBC 32.33 Hgb 7.5 BUN 72 Cr 1.9 Mag 1.4 procal 13.61 pro-bnp 6599  RVP neg  Imaging: CXR: Dense infiltrate off the right upper hilum is presently seen  EKG: Sinus rhythm 80 bpm with premature supraventricular complexes Left axis deviation Possible Lateral infarct, age undetermined  In ED patient given: azithromycin x1, zosyn x1, vanco x1, IVF NS 500mL bolus x1, ofirmev x1, midodrine 10mg x1, 1U PRBC       (08 Jun 2025 15:50)      ---  HOSPITAL COURSE: Patient was admitted for septic shock requiring pressors hence sent to ICU. CT chest showed multifocal R sided PNA. Patient was given zosyn IV antibiotics with improvement. Also was given IV steroids with transition to PO. Patient was also found to have aplastic anemia and was given 5 units PRBC over the course of hospitalization ; eliquis was also held. Patient underwent bronchoscopy for hemoptysis on 6/16 which showed pooling of blood in the right upper lobe but no notable lesions. TTE showed normal LV function with EF 71% and LV hypertrophy, normal RV size and function, moderate LA dilation . Patient was started on midodrine for hypotension with improvement. Patient also had KIMBERLY on admission due to hypotension which improved as shock state improved. Patient medically improved throughout hospital course. PT was consulted who recommended [facility]. Patient was medically stable for discharge to [facility] with close outpatient follow up.     ICU course: Rapid response called on 6/23 when pt was found to be obtunded. As per daughter, pt had been declining Bipap. ABG at the time was 7.31/82/153/41. Pt started on Bipap and transferred to ICU. Pt was requiring Precedex to tolerate Bipap and started on stress dose solucortef. Pt status improved overnight and was weaned off precedex. Pt was switched to NC during the day and continued on Bipap at night. Pt continued on solu-cortef 50mg BID. Pt received 1unit pRBCs for hx of aplastic anemia and PMR. Pt continued on Zosyn for multifocal PNA. Azithromycin was d/c.     PE and vitals on the day of discharge:     ---  CONSULTANTS:   Pulm (manda)  heme onc (Manpreet)  ID (justin)  Nephro (silvia)  Cardio ([facility])     ---  TIME SPENT:  I, the attending physician, was physically present for the key portions of the evaluation and management (E/M) service provided. The total amount of time spent reviewing the hospital notes, laboratory values, imaging findings, assessing/counseling the patient, discussing with consultant physicians, social work, nursing staff was -- minutes    ---  Primary care provider was made aware of plan for discharge:      [  ] NO     [  ] YES   HPI:  84 yr old female with PMHx afib on eliquis, COPD (not on home O2), PE/Rt lower extremity DVT 2017, Rt LE IVC filter 2017 CKD3, aplastic anemia, polymyalgia rheumatica on prednisone 5 mg po qd, requiring PRBC transfusions ~8 weeks (via Rt subclavian mediport), AVN bilat hips, HTN, HLD, HFpEF (75% 6.2.25), diastolic dysfx grade1, recent hospitalization 5/25/25-6/5/25 for AHDF/COPD exacerbation, pt also with hx of IVC filter, had Rt subclavian mediport. Pt with eventual improvement and transfer to [facility] facility from where she presents to E.D. today with c/o Rt sided chest pain. general malaise. Pt stated began to feel ill evening before presentation, daughter this a.m. endorsed pt lethargic, pale.     ED course  Vitals: 99.9 T , HR 71 , BP 82/48, RR 16 , SpO2 99% on 3L  Significant labs: WBC 32.33 Hgb 7.5 BUN 72 Cr 1.9 Mag 1.4 procal 13.61 pro-bnp 6599  RVP neg  Imaging: CXR: Dense infiltrate off the right upper hilum is presently seen  EKG: Sinus rhythm 80 bpm with premature supraventricular complexes Left axis deviation Possible Lateral infarct, age undetermined  In ED patient given: azithromycin x1, zosyn x1, vanco x1, IVF NS 500mL bolus x1, ofirmev x1, midodrine 10mg x1, 1U PRBC       (08 Jun 2025 15:50)      ---  HOSPITAL COURSE: Patient was admitted for septic shock requiring pressors hence sent to ICU. CT chest showed multifocal R sided PNA. Patient was given zosyn IV antibiotics with improvement. Also was given IV steroids with transition to PO. Patient was also found to have aplastic anemia and was given 5 units PRBC over the course of hospitalization ; eliquis was also held. Patient underwent bronchoscopy for hemoptysis on 6/16 which showed pooling of blood in the right upper lobe but no notable lesions. TTE showed normal LV function with EF 71% and LV hypertrophy, normal RV size and function, moderate LA dilation . Patient was started on midodrine for hypotension with improvement. Patient also had KIMBERLY on admission due to hypotension which improved as shock state improved. Patient medically improved throughout hospital course. PT was consulted who recommended [facility]. Patient was medically stable for discharge to [facility] with close outpatient follow up.     ICU course: Rapid response called on 6/23 when pt was found to be obtunded. As per daughter, pt had been declining Bipap. ABG at the time was 7.31/82/153/41. Pt started on Bipap and transferred to ICU. Pt was requiring Precedex to tolerate Bipap and started on stress dose solucortef. Pt status improved overnight and was weaned off precedex. Pt was switched to NC during the day and continued on Bipap at night. Pt continued on solu-cortef 50mg BID. Pt received 1unit pRBCs for hx of aplastic anemia and PMR. Pt continued on Zosyn for multifocal PNA. Azithromycin was d/c. Pt was transferred to floors on 6/24.       Rapid response called on 6/28 for AMS and hypotension. Pt did not wear Bipap overnight. ABG showed pCO2 92. Pt was placed on Bipap and transferred to ICU. BP did not respond to fluids and pt started on levophed for pressor support. Pt with improved mentation the following day, satting well on NC. Levophed was d/c and BP remained stable. Pt continued on stress dose steroids. Hgb was 7.2 and pt received 1 unit pRBC to maintain goal Hgb >8. US of left UE for swelling and bruising showed no evidence of left upper extremity deep venous thrombosis. Pt returned to floors on _____       PE and vitals on the day of discharge:     ---  CONSULTANTS:   Pulm lainey)  heme onc (Manpreet)  ID (justin)  Nephro (silvia)  Cardio ([facility])     ---  TIME SPENT:  I, the attending physician, was physically present for the key portions of the evaluation and management (E/M) service provided. The total amount of time spent reviewing the hospital notes, laboratory values, imaging findings, assessing/counseling the patient, discussing with consultant physicians, social work, nursing staff was -- minutes    ---  Primary care provider was made aware of plan for discharge:      [  ] NO     [  ] YES   HPI:  84 yr old female with PMHx afib on eliquis, COPD (not on home O2), PE/Rt lower extremity DVT 2017, Rt LE IVC filter 2017 CKD3, aplastic anemia, polymyalgia rheumatica on prednisone 5 mg po qd, requiring PRBC transfusions ~8 weeks (via Rt subclavian mediport), AVN bilat hips, HTN, HLD, HFpEF (75% 6.2.25), diastolic dysfx grade1, recent hospitalization 5/25/25-6/5/25 for AHDF/COPD exacerbation, pt also with hx of IVC filter, had Rt subclavian mediport. Pt with eventual improvement and transfer to [facility] facility from where she presents to E.D. today with c/o Rt sided chest pain. general malaise. Pt stated began to feel ill evening before presentation, daughter this a.m. endorsed pt lethargic, pale.     ED course  Vitals: 99.9 T , HR 71 , BP 82/48, RR 16 , SpO2 99% on 3L  Significant labs: WBC 32.33 Hgb 7.5 BUN 72 Cr 1.9 Mag 1.4 procal 13.61 pro-bnp 6599  RVP neg  Imaging: CXR: Dense infiltrate off the right upper hilum is presently seen  EKG: Sinus rhythm 80 bpm with premature supraventricular complexes Left axis deviation Possible Lateral infarct, age undetermined  In ED patient given: azithromycin x1, zosyn x1, vanco x1, IVF NS 500mL bolus x1, ofirmev x1, midodrine 10mg x1, 1U PRBC       (08 Jun 2025 15:50)      ---  HOSPITAL COURSE: Patient was admitted for septic shock requiring pressors hence sent to ICU. CT chest showed multifocal R sided PNA. Patient was given zosyn IV antibiotics with improvement. Also was given IV steroids with transition to PO. Patient was also found to have aplastic anemia and was given 5 units PRBC over the course of hospitalization ; eliquis was also held. Patient underwent bronchoscopy for hemoptysis on 6/16 which showed pooling of blood in the right upper lobe but no notable lesions. TTE showed normal LV function with EF 71% and LV hypertrophy, normal RV size and function, moderate LA dilation . Patient was started on midodrine for hypotension with improvement. Patient also had KIMBERLY on admission due to hypotension which improved as shock state improved. Patient medically improved throughout hospital course. PT was consulted who recommended [facility]. Patient was medically stable for discharge to [facility] with close outpatient follow up.     ICU course: Rapid response called on 6/23 when pt was found to be obtunded. As per daughter, pt had been declining Bipap. ABG at the time was 7.31/82/153/41. Pt started on Bipap and transferred to ICU. Pt was requiring Precedex to tolerate Bipap and started on stress dose solucortef. Pt status improved overnight and was weaned off precedex. Pt was switched to NC during the day and continued on Bipap at night. Pt continued on solu-cortef 50mg BID. Pt received 1unit pRBCs for hx of aplastic anemia and PMR. Pt continued on Zosyn for multifocal PNA. Azithromycin was d/c. Pt was transferred to floors on 6/24.       Rapid response called on 6/28 for AMS and hypotension. Pt did not wear Bipap overnight. ABG showed pCO2 92. Pt was placed on Bipap and transferred to ICU. BP did not respond to fluids and pt started on levophed for pressor support. Pt with improved mentation the following day, satting well on NC. Levophed was d/c and BP remained stable. Pt continued on stress dose steroids. Hgb was 7.2 and pt received 1 unit pRBC to maintain goal Hgb >8. US of left UE for swelling and bruising showed no evidence of left upper extremity deep venous thrombosis. Pt returned to floors on 6/30.       PE and vitals on the day of discharge:     ---  CONSULTANTS:   Pulm lainey)  heme onc (Manpreet)  ID (justin)  Nephro (silvia)  Cardio ([facility])     ---  TIME SPENT:  I, the attending physician, was physically present for the key portions of the evaluation and management (E/M) service provided. The total amount of time spent reviewing the hospital notes, laboratory values, imaging findings, assessing/counseling the patient, discussing with consultant physicians, social work, nursing staff was -- minutes    ---  Primary care provider was made aware of plan for discharge:      [  ] NO     [  ] YES   HPI:  84 yr old female with PMHx afib on eliquis, COPD (not on home O2), PE/Rt lower extremity DVT 2017, Rt LE IVC filter 2017 CKD3, aplastic anemia, polymyalgia rheumatica on prednisone 5 mg po qd, requiring PRBC transfusions ~8 weeks (via Rt subclavian mediport), AVN bilat hips, HTN, HLD, HFpEF (75% 6.2.25), diastolic dysfx grade1, recent hospitalization 5/25/25-6/5/25 for AHDF/COPD exacerbation, pt also with hx of IVC filter, had Rt subclavian mediport. Pt with eventual improvement and transfer to [facility] facility from where she presents to E.D. today with c/o Rt sided chest pain. general malaise. Pt stated began to feel ill evening before presentation, daughter this a.m. endorsed pt lethargic, pale.     ED course  Vitals: 99.9 T , HR 71 , BP 82/48, RR 16 , SpO2 99% on 3L  Significant labs: WBC 32.33 Hgb 7.5 BUN 72 Cr 1.9 Mag 1.4 procal 13.61 pro-bnp 6599  RVP neg  Imaging: CXR: Dense infiltrate off the right upper hilum is presently seen  EKG: Sinus rhythm 80 bpm with premature supraventricular complexes Left axis deviation Possible Lateral infarct, age undetermined  In ED patient given: azithromycin x1, zosyn x1, vanco x1, IVF NS 500mL bolus x1, ofirmev x1, midodrine 10mg x1, 1U PRBC       (08 Jun 2025 15:50)      ---  HOSPITAL COURSE: Patient was admitted for septic shock requiring pressors hence sent to ICU. CT chest showed multifocal R sided PNA. Patient was given zosyn IV antibiotics with improvement. Also was given IV steroids with transition to PO. Patient was also found to have aplastic anemia and was given 5 units PRBC at that time; home Eliquis was also held. Patient underwent bronchoscopy for hemoptysis on 6/16 which showed pooling of blood in the right upper lobe but no notable lesions. TTE showed normal LV function with EF 71% and LV hypertrophy, normal RV size and function, moderate LA dilation. Patient was started on midodrine for hypotension with improvement. Patient also had KIMBERLY on admission due to hypotension which improved as shock state improved. Patient medically improved throughout hospital course. PT was consulted who recommended [facility]. Patient was medically stable for discharge to [facility] with close outpatient follow up.     ICU course: Rapid response called on 6/23 when pt was found to be obtunded. As per daughter, pt had been declining BIPAP. ABG at the time was 7.31/82/153/41. Pt started on BIPAP and transferred to ICU. Pt was requiring Precedex to tolerate BIPAP and started on stress dose Solu-cortef. Pt status improved overnight and was weaned off Precedex. Pt was switched to NC during the day and continued on BIPAP at night. Pt continued on Solu-cortef 50mg BID. Pt received 1unit pRBCs for hx of aplastic anemia and PMR. Pt continued on Zosyn for multifocal PNA. Azithromycin was d/c. Pt was transferred to floors on 6/24.     ICU course: Rapid response called on 6/28 for AMS and hypotension. Pt did not wear BIPAP overnight. ABG showed pCO2 92. Pt was placed on BIPAP and transferred to ICU. BP did not respond to fluids and pt started on Levophed for pressor support. Pt with improved mentation the following day, satting well on NC. Levophed was d/c and BP remained stable. Pt continued on stress dose steroids. Hgb was 7.2 and pt received 1 unit pRBC to maintain goal Hgb >8. US of left UE for swelling and bruising showed no evidence of left upper extremity deep venous thrombosis. Pt returned to floors on 6/30.     On the floors, patient was started on a taper protocol for stress steroids, which will take 2-3 weeks to achieve baseline dose. Solu-cortef dose was decreased to 25mg BID. Patient was transitioned from IV Lasix 40mg qd to home Torsemide 20mg qd. Patient was restarted on home Eliquis 2.5mg q12h for chronic atrial fibrillation.       PE and vitals on the day of discharge:   VITALS:   T(C): 36.8 (07-03-25 @ 11:30), Max: 36.9 (07-02-25 @ 20:26)  HR: 78 (07-03-25 @ 11:30) (67 - 83)  BP: 129/67 (07-03-25 @ 11:30) (129/67 - 144/83)  RR: 20 (07-03-25 @ 11:30) (18 - 20)  SpO2: 96% (07-03-25 @ 11:30) (96% - 100%)    GENERAL: NAD, lying in bed comfortably  HEAD:  Atraumatic, normocephalic  EYES: conjunctiva and sclera clear  ENT: Moist mucous membranes  NECK: Supple  HEART: Regular rate and rhythm, no murmurs, rubs, or gallops  LUNGS: Clear to auscultation anteriorly; no crackles, wheezing, or rhonchi  ABDOMEN: Soft, nontender, nondistended, +BS  EXTREMITIES: 2+ peripheral pulses bilaterally. No clubbing, cyanosis, or edema  NERVOUS SYSTEM:  A&Ox3    ---  CONSULTANTS:   Pulm (manda)  heme onc (Manpreet)  ID (justin)  Nephro (silvia)  Cardio ([facility])     ---  TIME SPENT:  I, the attending physician, was physically present for the key portions of the evaluation and management (E/M) service provided. The total amount of time spent reviewing the hospital notes, laboratory values, imaging findings, assessing/counseling the patient, discussing with consultant physicians, social work, nursing staff was -- minutes    ---  Primary care provider was made aware of plan for discharge:      [  ] NO     [  ] YES   HPI:  84 yr old female with PMHx afib on eliquis, COPD (not on home O2), PE/Rt lower extremity DVT 2017, Rt LE IVC filter 2017 CKD3, aplastic anemia, polymyalgia rheumatica on prednisone 5 mg po qd, requiring PRBC transfusions ~8 weeks (via Rt subclavian mediport), AVN bilat hips, HTN, HLD, HFpEF (75% 6.2.25), diastolic dysfx grade1, recent hospitalization 5/25/25-6/5/25 for AHDF/COPD exacerbation, pt also with hx of IVC filter, had Rt subclavian mediport. Pt with eventual improvement and transfer to [facility] facility from where she presents to E.D. today with c/o Rt sided chest pain. general malaise. Pt stated began to feel ill evening before presentation, daughter this a.m. endorsed pt lethargic, pale.     ED course  Vitals: 99.9 T , HR 71 , BP 82/48, RR 16 , SpO2 99% on 3L  Significant labs: WBC 32.33 Hgb 7.5 BUN 72 Cr 1.9 Mag 1.4 procal 13.61 pro-bnp 6599  RVP neg  Imaging: CXR: Dense infiltrate off the right upper hilum is presently seen  EKG: Sinus rhythm 80 bpm with premature supraventricular complexes Left axis deviation Possible Lateral infarct, age undetermined  In ED patient given: azithromycin x1, zosyn x1, vanco x1, IVF NS 500mL bolus x1, ofirmev x1, midodrine 10mg x1, 1U PRBC       (08 Jun 2025 15:50)      ---  HOSPITAL COURSE: Patient was admitted for septic shock requiring pressors hence sent to ICU. CT chest showed multifocal R sided PNA. Patient was given zosyn IV antibiotics with improvement. Also was given IV steroids with transition to PO. Patient was also found to have aplastic anemia and was given 5 units PRBC at that time; home Eliquis was also held. Patient underwent bronchoscopy for hemoptysis on 6/16 which showed pooling of blood in the right upper lobe but no notable lesions. TTE showed normal LV function with EF 71% and LV hypertrophy, normal RV size and function, moderate LA dilation. Patient was started on midodrine for hypotension with improvement. Patient also had KIMBERLY on admission due to hypotension which improved as shock state improved. Patient medically improved throughout hospital course. PT was consulted who recommended [facility]. Patient was medically stable for discharge to [facility] with close outpatient follow up.     ICU course: Rapid response called on 6/23 when pt was found to be obtunded. As per daughter, pt had been declining BIPAP. ABG at the time was 7.31/82/153/41. Pt started on BIPAP and transferred to ICU. Pt was requiring Precedex to tolerate BIPAP and started on stress dose Solu-cortef. Pt status improved overnight and was weaned off Precedex. Pt was switched to NC during the day and continued on BIPAP at night. Pt continued on Solu-cortef 50mg BID. Pt received 1unit pRBCs for hx of aplastic anemia and PMR. Pt continued on Zosyn for multifocal PNA. Azithromycin was d/c. Pt was transferred to floors on 6/24.     ICU course: Rapid response called on 6/28 for AMS and hypotension. Pt did not wear BIPAP overnight. ABG showed pCO2 92. Pt was placed on BIPAP and transferred to ICU. BP did not respond to fluids and pt started on Levophed for pressor support. Pt with improved mentation the following day, satting well on NC. Levophed was d/c and BP remained stable. Pt continued on stress dose steroids. Hgb was 7.2 and pt received 1 unit pRBC to maintain goal Hgb >8. US of left UE for swelling and bruising showed no evidence of left upper extremity deep venous thrombosis. Pt returned to floors on 6/30.     On the floors, patient was started on a taper protocol for stress steroids, which will take 2-3 weeks to achieve baseline dose. Solu-cortef dose was decreased to 25mg BID. Patient was transitioned from IV Lasix 40mg qd to home Torsemide 20mg qd. Patient was restarted on home Eliquis 2.5mg q12h for chronic atrial fibrillation.       PE and vitals on the day of discharge:   VITALS:   stable     GENERAL: NAD, siting in bed   HEAD:  Atraumatic, normocephalic  EYES: conjunctiva and sclera clear  ENT: Moist mucous membranes  NECK: Supple  HEART: Regular rate and rhythm, no murmurs, rubs, or gallops  LUNGS: Clear to auscultation anteriorly; no crackles, wheezing, or rhonchi  ABDOMEN: Soft, nontender, nondistended, +BS  EXTREMITIES: 2+ peripheral pulses bilaterally. No clubbing, cyanosis, , trace ankle edema   NERVOUS SYSTEM:  A&Ox3    ---  CONSULTANTS:   Pulm (manda)  heme onc (Manpreet)  ID (justin)  Nephro (silvia)  Cardio ([facility])

## 2025-06-12 NOTE — PROGRESS NOTE ADULT - ASSESSMENT
Prerenal azotemia, CKD 3  Dyspnea: Pneumonia, h/o COPD/CHF  s/p Shock   Diabetes  Aplastic anemia, requiring frequent blood transfusions    Improved and stable renal indices. To continue current meds. IV abx, ID follow up. Avoid excessive PO fluids. Monitor blood sugar levels. Insulin coverage as needed. Dietary restriction.   Trend BP and titrate BP meds as needed. Monitor h/h trend. Transfuse PRBC's PRN. Avoid nephrotoxic meds as possible. Will follow electrolytes and renal function trend.

## 2025-06-12 NOTE — PROGRESS NOTE ADULT - SUBJECTIVE AND OBJECTIVE BOX
CHIEF COMPLAINT/ REASON FOR VISIT  .. Patient was seen to address the  issue listed under PROBLEM LIST which is located toward bottom of this note     MARTINEZ PATEL TELE 306 W1    Allergies    No Known Allergies    Intolerances        PAST MEDICAL & SURGICAL HISTORY:  COPD (chronic obstructive pulmonary disease)      DM (diabetes mellitus)  diet-controlled      PAF (paroxysmal atrial fibrillation)  s/p ablation      Hiatal hernia      H/O aplastic anemia      History of IBS      H/O osteoporosis      HLD (hyperlipidemia)      PMR (polymyalgia rheumatica)      History of basal cell cancer      Chronic kidney disease (CKD)      Hypotension      Presence of IVC filter      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      History of immunodeficiency      Lumbar compression fracture      H/O hiatal hernia      H/O pulmonary fibrosis      H/O sinus bradycardia      History of fracture of right hip  "unoperable"      S/P hysterectomy      S/P foot surgery      S/P knee surgery      After cataract  bilateral eye cataract surgically removed      Closed right hip fracture  rigth hip ORIF july 19 2016      S/P IVC filter  nov 2016      S/P carpal tunnel release  Right 2008 & 6/27/17      H/O kyphoplasty      Port-A-Cath in place  right chest          FAMILY HISTORY:  Family history of bladder cancer (Mother)    Family history of myocardial infarction (Father)    FH: atrial fibrillation (Father)        Home Medications:  amiodarone 200 mg oral tablet: 0.5 tab(s) orally once a day (08 Jun 2025 16:43)  Bentyl 10 mg oral capsule: 1 cap(s) orally 2 times a day, As Needed (08 Jun 2025 16:43)  Eliquis 2.5 mg oral tablet: 1 tab(s) orally 2 times a day (08 Jun 2025 16:43)  esomeprazole 20 mg oral delayed release capsule: 1 cap(s) orally once a day (08 Jun 2025 16:43)  fluticasone-salmeterol 500 mcg-50 mcg/inh inhalation powder: 1 puff(s) inhaled 2 times a day (08 Jun 2025 16:43)  folic acid 1 mg oral tablet: 1 tab(s) orally once a day (in the morning) (08 Jun 2025 16:43)  gabapentin 400 mg oral capsule: 1 cap(s) orally 2 times a day (08 Jun 2025 16:43)  ipratropium-albuterol 0.5 mg-2.5 mg/3 mL inhalation solution: 3 milliliter(s) inhaled every 6 hours As needed Shortness of Breath and/or Wheezing (08 Jun 2025 16:43)  IVIG monthly:  (08 Jun 2025 16:43)  leflunomide 10 mg oral tablet: 1 tab(s) orally once a day (08 Jun 2025 16:43)  midodrine 10 mg oral tablet: 1 tab(s) orally every 8 hours (08 Jun 2025 16:43)  montelukast 10 mg oral tablet: 1 tab(s) orally once a day (08 Jun 2025 16:43)  pantoprazole 40 mg oral delayed release tablet: 1 tab(s) orally once a day (before a meal) (08 Jun 2025 16:43)  predniSONE 5 mg oral tablet: 1 tab(s) orally once a day (08 Jun 2025 16:43)  Procrit 10,000 units/mL preservative-free injectable solution: injectable once a week WEDNESDAY (08 Jun 2025 16:43)  Reclast Infusion , IV x 1 yearly:  (08 Jun 2025 16:43)  simvastatin 10 mg oral tablet: 1 tab(s) orally once a day (at bedtime) (08 Jun 2025 16:43)  tiotropium 2.5 mcg/inh inhalation aerosol: 2 puff(s) inhaled once a day (08 Jun 2025 16:43)  tiZANidine: 2 tab(s) orally once a day (at bedtime) as needed for  muscle spasm (08 Jun 2025 16:43)  torsemide 20 mg oral tablet: 1 tab(s) orally once a day (in the morning) (08 Jun 2025 16:49)      MEDICATIONS  (STANDING):  albuterol/ipratropium for Nebulization 3 milliLiter(s) Nebulizer every 6 hours  aMIOdarone    Tablet 100 milliGRAM(s) Oral daily  chlorhexidine 2% Cloths 1 Application(s) Topical <User Schedule>  dextrose 5%. 1000 milliLiter(s) (100 mL/Hr) IV Continuous <Continuous>  dextrose 5%. 1000 milliLiter(s) (50 mL/Hr) IV Continuous <Continuous>  dextrose 50% Injectable 25 Gram(s) IV Push once  dextrose 50% Injectable 12.5 Gram(s) IV Push once  dextrose 50% Injectable 25 Gram(s) IV Push once  droxidopa 100 milliGRAM(s) Oral every 8 hours  fluticasone propionate/ salmeterol 500-50 MICROgram(s) Diskus 1 Dose(s) Inhalation two times a day  gabapentin 400 milliGRAM(s) Oral every 12 hours  glucagon  Injectable 1 milliGRAM(s) IntraMuscular once  heparin   Injectable 5000 Unit(s) SubCutaneous every 12 hours  hydrocortisone sodium succinate Injectable 50 milliGRAM(s) IV Push every 12 hours  insulin lispro (ADMELOG) corrective regimen sliding scale   SubCutaneous three times a day before meals  insulin lispro (ADMELOG) corrective regimen sliding scale   SubCutaneous at bedtime  midodrine 10 milliGRAM(s) Oral every 8 hours  montelukast 10 milliGRAM(s) Oral daily  pantoprazole  Injectable 40 milliGRAM(s) IV Push daily  piperacillin/tazobactam IVPB.. 3.375 Gram(s) IV Intermittent every 8 hours  tiotropium 2.5 MICROgram(s) Inhaler 2 Puff(s) Inhalation daily    MEDICATIONS  (PRN):  acetaminophen     Tablet .. 650 milliGRAM(s) Oral every 6 hours PRN Mild Pain (1 - 3)  dextrose Oral Gel 15 Gram(s) Oral once PRN Blood Glucose LESS THAN 70 milliGRAM(s)/deciliter      Diet, Regular:   DASH/TLC Sodium & Cholesterol Restricted (06-08-25 @ 15:56) [Active]          Vital Signs Last 24 Hrs  T(C): 36.6 (12 Jun 2025 04:43), Max: 36.8 (11 Jun 2025 21:54)  T(F): 97.9 (12 Jun 2025 04:43), Max: 98.2 (11 Jun 2025 21:54)  HR: 84 (12 Jun 2025 06:30) (60 - 84)  BP: 142/80 (12 Jun 2025 06:30) (106/56 - 142/80)  BP(mean): --  RR: 18 (12 Jun 2025 04:43) (18 - 18)  SpO2: 99% (12 Jun 2025 04:43) (95% - 100%)    Parameters below as of 12 Jun 2025 04:43  Patient On (Oxygen Delivery Method): nasal cannula  O2 Flow (L/min): 2        06-11-25 @ 07:01  -  06-12-25 @ 07:00  --------------------------------------------------------  IN: 0 mL / OUT: 400 mL / NET: -400 mL              LABS:                        7.1    5.92  )-----------( 263      ( 11 Jun 2025 06:45 )             22.6     06-11    141  |  107  |  52[H]  ----------------------------<  104[H]  5.1   |  28  |  1.30    Ca    7.6[L]      11 Jun 2025 06:45    TPro  5.7[L]  /  Alb  3.0[L]  /  TBili  0.6  /  DBili  x   /  AST  6[L]  /  ALT  27  /  AlkPhos  33[L]  06-11    PTT - ( 11 Jun 2025 03:30 )  PTT:26.8 sec  Urinalysis Basic - ( 11 Jun 2025 06:45 )    Color: x / Appearance: x / SG: x / pH: x  Gluc: 104 mg/dL / Ketone: x  / Bili: x / Urobili: x   Blood: x / Protein: x / Nitrite: x   Leuk Esterase: x / RBC: x / WBC x   Sq Epi: x / Non Sq Epi: x / Bacteria: x        ABG - ( 10 Nick 2025 17:30 )  pH, Arterial: 7.47  pH, Blood: x     /  pCO2: 45    /  pO2: 127   / HCO3: 33    / Base Excess: 9.1   /  SaO2: 99.1                WBC:  WBC Count: 5.92 K/uL (06-11 @ 06:45)  WBC Count: 7.40 K/uL (06-11 @ 03:30)  WBC Count: 9.87 K/uL (06-10 @ 20:29)  WBC Count: 9.24 K/uL (06-10 @ 06:40)  WBC Count: 24.78 K/uL (06-09 @ 05:48)  WBC Count: 42.66 K/uL (06-08 @ 19:35)  WBC Count: 32.33 K/uL (06-08 @ 11:20)      MICROBIOLOGY:  RECENT CULTURES:  06-08 Sputum Sputum XXXX   Few Squamous epithelial cells per low power field  Few polymorphonuclear leukocytes per low power field  Few Gram Positive Cocci in Pairs and Chains per oil power field   Commensal gniger consistent with body site    06-08 Clean Catch XXXX XXXX   <10,000 CFU/mL Normal Urogenital Ginger    06-08 Blood Blood-Peripheral XXXX XXXX   No growth at 72 Hours    06-08 Blood Blood-Peripheral XXXX XXXX   No growth at 72 Hours                PTT - ( 11 Jun 2025 03:30 )  PTT:26.8 sec    Sodium:  Sodium: 141 mmol/L (06-11 @ 06:45)  Sodium: 139 mmol/L (06-10 @ 06:40)  Sodium: 137 mmol/L (06-09 @ 05:48)  Sodium: 134 mmol/L (06-08 @ 19:35)  Sodium: 137 mmol/L (06-08 @ 11:20)      1.30 mg/dL 06-11 @ 06:45  1.30 mg/dL 06-10 @ 06:40  1.40 mg/dL 06-09 @ 05:48  2.00 mg/dL 06-08 @ 19:35  1.90 mg/dL 06-08 @ 11:20      Hemoglobin:  Hemoglobin: 7.1 g/dL (06-11 @ 06:45)  Hemoglobin: 7.6 g/dL (06-11 @ 03:30)  Hemoglobin: 7.4 g/dL (06-10 @ 20:29)  Hemoglobin: 7.6 g/dL (06-10 @ 06:40)  Hemoglobin: 7.8 g/dL (06-09 @ 05:48)  Hemoglobin: 8.7 g/dL (06-08 @ 19:35)  Hemoglobin: 7.5 g/dL (06-08 @ 11:20)      Platelets: Platelet Count - Automated: 263 K/uL (06-11 @ 06:45)  Platelet Count - Automated: 213 K/uL (06-11 @ 03:30)  Platelet Count - Automated: 260 K/uL (06-10 @ 20:29)  Platelet Count - Automated: 213 K/uL (06-10 @ 06:40)  Platelet Count - Automated: 190 K/uL (06-09 @ 05:48)  Platelet Count - Automated: 200 K/uL (06-08 @ 19:35)  Platelet Count - Automated: 153 K/uL (06-08 @ 11:20)      LIVER FUNCTIONS - ( 11 Jun 2025 06:45 )  Alb: 3.0 g/dL / Pro: 5.7 g/dL / ALK PHOS: 33 U/L / ALT: 27 U/L / AST: 6 U/L / GGT: x             Urinalysis Basic - ( 11 Jun 2025 06:45 )    Color: x / Appearance: x / SG: x / pH: x  Gluc: 104 mg/dL / Ketone: x  / Bili: x / Urobili: x   Blood: x / Protein: x / Nitrite: x   Leuk Esterase: x / RBC: x / WBC x   Sq Epi: x / Non Sq Epi: x / Bacteria: x        RADIOLOGY & ADDITIONAL STUDIES:      MICROBIOLOGY:  RECENT CULTURES:  06-08 Sputum Sputum XXXX   Few Squamous epithelial cells per low power field  Few polymorphonuclear leukocytes per low power field  Few Gram Positive Cocci in Pairs and Chains per oil power field   Commensal ginger consistent with body site    06-08 Clean Catch XXXX XXXX   <10,000 CFU/mL Normal Urogenital Ginger    06-08 Blood Blood-Peripheral XXXX XXXX   No growth at 72 Hours    06-08 Blood Blood-Peripheral XXXX XXXX   No growth at 72 Hours

## 2025-06-12 NOTE — DISCHARGE NOTE PROVIDER - PROVIDER TOKENS
PROVIDER:[TOKEN:[[id number]]],PROVIDER:[TOKEN:[[id number]]] PROVIDER:[TOKEN:[[id number]]],PROVIDER:[TOKEN:[[id number]]],PROVIDER:[TOKEN:[[id number]]],PROVIDER:[TOKEN:[[id number]]],PROVIDER:[TOKEN:[[id number]]] PROVIDER:[TOKEN:[[id number]],FOLLOWUP:[1 week]],PROVIDER:[TOKEN:[[id number]],FOLLOWUP:[2 weeks]],PROVIDER:[TOKEN:[[id number]],FOLLOWUP:[2 weeks]],PROVIDER:[TOKEN:[[id number]],FOLLOWUP:[Routine]],PROVIDER:[TOKEN:[[id number]],FOLLOWUP:[2 weeks]],PROVIDER:[TOKEN:[[id number]],FOLLOWUP:[Routine]]

## 2025-06-12 NOTE — DISCHARGE NOTE PROVIDER - CARE PROVIDERS DIRECT ADDRESSES
,[email or web address],DirectAddress_Unknown,[email or web address] ,[email or web address],DirectAddress_Unknown

## 2025-06-12 NOTE — PROGRESS NOTE ADULT - ATTENDING COMMENTS
84F h/o AFib on Eliquis, COPD, CKD, aplastic anemia, polymyalgia rheumatica on chronic steroids, avascular necrosis of hips, HTN, HLD, with recent admission to [place] 5/26 - 6/5 for acute HFpEF exacerbation and COPD exacerbation, discharged to rehab on 6/5 and returned to ED with right sided chest pain, general malaise and feeling unwell. Admitted to ICU for septic shock and acute hypoxic respiratory failure likely 2/2 to PNA and KIMBERLY. Now downgraded to tele floors. Continue IV Zosyn - plan for 7 day course of ID Dr Velazco. Legionella negative, off Azithromycin. Follow up Quantiferon, serum Fungitell and galactomannan. Taper off supplemental O2 with appropriate SpO2.   s/p 1 unit pRBC with appropriate increase in H/H. Will trial Heparin gtt (in lieu of home Eliquis) - if H/H downtrends will hold and discuss with cardio and heme need for full dose AC. Discussed with heme Dr Parker.   Continue solu-cortef BID - taper to discontinuation per pulm. Continue midodrine and notherna.  discussed with daughter Lenore aware and in agreement with above.

## 2025-06-12 NOTE — PROGRESS NOTE ADULT - PROBLEM SELECTOR PLAN 3
Suspect secondary to shock  -continue midodrine 10 mg every 8 hours  -serial enzymes reviewed  -TTE performed 6/2 showing normal LV function with EF 71% and LV hypertrophy, normal RV size and function, moderate LA dilation   - f/u TTE  - The patient has been weaned off of vasopressors, monitor hemodynamics closely  -cardio following

## 2025-06-12 NOTE — PROGRESS NOTE ADULT - ASSESSMENT
84F h/o AFib on Eliquis, COPD, CKD, aplastic anemia, polymyalgia rheumatica on chronic steroids, avascular necrosis of hips, HTN, HLD, with recent admission to [place] 5/26 - 6/5 for acute HFpEF exacerbation and COPD exacerbation, discharged to rehab on 6/5 and returning today with right sided chest pain, general malaise and feeling unwell. She reports some slight cough though otherwise no other new symptoms reported. Found to be hypotensive, febrile w/ leukocytosis. Admitted to ICU for septic shock likely 2/2 to PNA and KIMBERLY. Now stable for downgrade to tele.

## 2025-06-12 NOTE — PROGRESS NOTE ADULT - PROBLEM SELECTOR PLAN 1
In the ED, tmax of 101 w/ KIMBERLY on CKD with Cr 1.90 (baseline 1.2-1.6), Hypotensive with SBP 80's (pt on midodrine therapy, usual 's). also w/ leukocytosis of 32.33. Procalcitonin of 13.6, RVP neg. Pt treated in ICU for septic shock requiring pressors, downgraded from the ICU to telemetry on 6/10  - CXR: Dense infiltrate off the right upper hilum is presently seen  - CT chest 6/10 with multifocal R sided PNA  - continue zosyn, d/c azithro  - c/w solumedrol 50mg q6, plan to transition to p.o. steroids  - MRSA/MSSA PCR negative  - trend WBC and fever curve, supplemental O2 prn to maintain SPO2 > 92%  - ID consulted Dr Juan A varma appreciated In the ED, tmax of 101 w/ KIMBERLY on CKD with Cr 1.90 (baseline 1.2-1.6), Hypotensive with SBP 80's (pt on midodrine therapy, usual 's). also w/ leukocytosis of 32.33. Procalcitonin of 13.6, RVP neg. Pt treated in ICU for septic shock requiring pressors, downgraded from the ICU to telemetry on 6/10  - CXR: Dense infiltrate off the right upper hilum is presently seen  - CT chest 6/10 with multifocal R sided PNA  - continue zosyn, d/c azithro  - c/w solucortef 50 q12  - F/U quant, fungitell  - MRSA/MSSA PCR negative  - trend WBC and fever curve, supplemental O2 prn to maintain SPO2 > 92% -- taper o2  - ID consulted Dr Juan A varma appreciated

## 2025-06-12 NOTE — DISCHARGE NOTE PROVIDER - NPI NUMBER (FOR SYSADMIN USE ONLY) :
[[id number] [[id number]],[[id number]],[[id number]],[[id number]],[[id number]] [[id number]],[[id number]],[[id number]],[[id number]]

## 2025-06-12 NOTE — PROGRESS NOTE ADULT - SUBJECTIVE AND OBJECTIVE BOX
Interval History:  remains weak and SOB  received PRBC yesterday  Chart reviewed and events noted;   Overnight events:    MEDICATIONS  (STANDING):  albuterol/ipratropium for Nebulization 3 milliLiter(s) Nebulizer every 6 hours  aMIOdarone    Tablet 100 milliGRAM(s) Oral daily  chlorhexidine 2% Cloths 1 Application(s) Topical <User Schedule>  dextrose 5%. 1000 milliLiter(s) (50 mL/Hr) IV Continuous <Continuous>  dextrose 5%. 1000 milliLiter(s) (100 mL/Hr) IV Continuous <Continuous>  dextrose 50% Injectable 25 Gram(s) IV Push once  dextrose 50% Injectable 12.5 Gram(s) IV Push once  dextrose 50% Injectable 25 Gram(s) IV Push once  droxidopa 100 milliGRAM(s) Oral every 8 hours  fluticasone propionate/ salmeterol 500-50 MICROgram(s) Diskus 1 Dose(s) Inhalation two times a day  gabapentin 400 milliGRAM(s) Oral every 12 hours  glucagon  Injectable 1 milliGRAM(s) IntraMuscular once  heparin  Infusion.  Unit(s)/Hr (12 mL/Hr) IV Continuous <Continuous>  hydrocortisone sodium succinate Injectable 50 milliGRAM(s) IV Push every 12 hours  insulin lispro (ADMELOG) corrective regimen sliding scale   SubCutaneous three times a day before meals  insulin lispro (ADMELOG) corrective regimen sliding scale   SubCutaneous at bedtime  midodrine 10 milliGRAM(s) Oral every 8 hours  montelukast 10 milliGRAM(s) Oral daily  pantoprazole  Injectable 40 milliGRAM(s) IV Push daily  piperacillin/tazobactam IVPB.. 3.375 Gram(s) IV Intermittent every 8 hours  tiotropium 2.5 MICROgram(s) Inhaler 2 Puff(s) Inhalation daily    MEDICATIONS  (PRN):  acetaminophen     Tablet .. 650 milliGRAM(s) Oral every 6 hours PRN Mild Pain (1 - 3)  dextrose Oral Gel 15 Gram(s) Oral once PRN Blood Glucose LESS THAN 70 milliGRAM(s)/deciliter  heparin   Injectable 5500 Unit(s) IV Push every 6 hours PRN For aPTT less than 40  heparin   Injectable 2500 Unit(s) IV Push every 6 hours PRN For aPTT between 40 - 57      Vital Signs Last 24 Hrs  T(C): 36.6 (12 Jun 2025 12:03), Max: 36.8 (11 Jun 2025 21:54)  T(F): 97.9 (12 Jun 2025 12:03), Max: 98.2 (11 Jun 2025 21:54)  HR: 73 (12 Jun 2025 13:50) (60 - 84)  BP: 109/63 (12 Jun 2025 13:50) (106/56 - 142/80)  BP(mean): --  RR: 19 (12 Jun 2025 13:50) (18 - 19)  SpO2: 87% (12 Jun 2025 13:50) (87% - 100%)    Parameters below as of 12 Jun 2025 13:50  Patient On (Oxygen Delivery Method): room air        PHYSICAL EXAM  General: adult in NAD  HEENT: clear oropharynx, anicteric sclera, pink conjunctivae  Neck: supple  CV: normal S1S2 with no murmur rubs or gallops  Lungs: clear to auscultation, no wheezes, no rhales  Abdomen: soft non-tender non-distended, no hepato/splenomegaly  Ext: no clubbing cyanosis or edema  Skin: no rashes and no petichiae  Neuro: alert and oriented X3 no focal deficits      LABS:  CBC Full  -  ( 12 Jun 2025 09:20 )  WBC Count : 8.82 K/uL  RBC Count : 2.90 M/uL  Hemoglobin : 8.8 g/dL  Hematocrit : 27.7 %  Platelet Count - Automated : 271 K/uL  Mean Cell Volume : 95.5 fl  Mean Cell Hemoglobin : 30.3 pg  Mean Cell Hemoglobin Concentration : 31.8 g/dL  Auto Neutrophil # : x  Auto Lymphocyte # : x  Auto Monocyte # : x  Auto Eosinophil # : x  Auto Basophil # : x  Auto Neutrophil % : x  Auto Lymphocyte % : x  Auto Monocyte % : x  Auto Eosinophil % : x  Auto Basophil % : x    06-12    139  |  104  |  49[H]  ----------------------------<  177[H]  4.4   |  28  |  1.30    Ca    8.6      12 Jun 2025 09:20  Phos  2.0     06-12  Mg     1.7     06-12    TPro  5.8[L]  /  Alb  3.1[L]  /  TBili  0.7  /  DBili  x   /  AST  <3[L]  /  ALT  23  /  AlkPhos  37[L]  06-12    PTT - ( 11 Jun 2025 03:30 )  PTT:26.8 sec    fe studies      WBC trend  8.82 K/uL (06-12-25 @ 09:20)  5.92 K/uL (06-11-25 @ 06:45)  7.40 K/uL (06-11-25 @ 03:30)  9.87 K/uL (06-10-25 @ 20:29)  9.24 K/uL (06-10-25 @ 06:40)      Hgb trend  8.8 g/dL (06-12-25 @ 09:20)  7.1 g/dL (06-11-25 @ 06:45)  7.6 g/dL (06-11-25 @ 03:30)  7.4 g/dL (06-10-25 @ 20:29)  7.6 g/dL (06-10-25 @ 06:40)      plt trend  271 K/uL (06-12-25 @ 09:20)  263 K/uL (06-11-25 @ 06:45)  213 K/uL (06-11-25 @ 03:30)  260 K/uL (06-10-25 @ 20:29)  213 K/uL (06-10-25 @ 06:40)        RADIOLOGY & ADDITIONAL STUDIES:

## 2025-06-12 NOTE — PROGRESS NOTE ADULT - PROBLEM SELECTOR PLAN 8
chronic rate controlled   monitor sputum production ( blood tinged today )   would benefit overall from therapeutic AC but given low h/h, agree w/ heparin sq for now  c/w amiodarone 100mg qd. chronic rate controlled   monitor sputum production ( blood tinged today )   would benefit overall from therapeutic AC but given low h/h  c/w amiodarone 100mg qd.  - 6/12 heparin gtt

## 2025-06-12 NOTE — PROGRESS NOTE ADULT - ASSESSMENT
84 female PMH of A-fib on Eliquis, COPD, CKD, aplastic anemia, polymyalgia rheumatica, on chronic steroids, avascular necrosis of hips, HLD, HTN, DM, recent admission to [place] 5/26 through 6/5/2025 for CHF/fluid overload/COPD exacerbation, presents to the ED form [facility] for evaluation of fever and generalized feeling of being unwell, found to be septic and hypotensive.     Anemia/Atypical CP, PAfib, HTN  - With symptomatic anemia.   - s/p PRBC's for Hgb 7.  No clear evidence of bleeding  - Back on Heparin gtt for now (being given in lieu of home Eliquis).  Heme following for MDS  - Transfuse per Primary    - No known Hx of CAD  - CP likely in the setting of significant anemia or pleuritic.  Trops x 2 neg  - Had atypical CP, 6/11  - CxR shows a focal consolidation in the right lung.  Abx per Primary  - Initiate incentive spirometer  - EKG with no signs of acute ischemia    - Bp stable at systolic 100-140  - Has baseline dysautonomia with resultant severe orthostasis.    - s/p IV resuscitation and IV pressor  - Continue Midodrine and Northera    - ECHO 6/2/25:  LVEF 71%, LVH, and mod pHTN.  No other significant valvular disease  - Would hold her diuretic therapy at this time given that she appears dry on exam.  - Wean O2; satting  on 1L NC    - Hx of paroxysmal atrial fibrillation and DVT/PE  - Now back on Heparin gtt.  To switch to Eliquis eventually  - SB at 50's on tele.  Can D/C  - Continue home Amiodarone 100 mg daily    - Monitor and replete electrolytes. Keep K>4.0 and Mg>2.0.  - Will continue to follow    Shaneka Rogers DNP, NP-C, AGACNP-C  Cardiology   Call TEAMS                oriented to person, place, time and situation

## 2025-06-12 NOTE — PROGRESS NOTE ADULT - PROBLEM SELECTOR PLAN 4
-likely in the setting of sepsis   -on home full AC with Eliquis  -heparin sq ordered for now, further plans pending h/h stabilization -likely in the setting of sepsis   -on home full AC with Eliquis  - 6/12 heparin gtt

## 2025-06-12 NOTE — PROGRESS NOTE ADULT - SUBJECTIVE AND OBJECTIVE BOX
[facility] -- Sai Valle, Girish Jackson Savella, , Rene Hernandez  Office # [phone]    Follow Up:  Hypotension    Subjective/Observations: Awake and alert, states, she feels better, though gets winded with exertion.  NC at 2L/min in use but satting at %.  Noted to have Rt sided CP yesterday afternoon per chart but no complaints overnight.      REVIEW OF SYSTEMS: All other review of systems is negative unless indicated above  PAST MEDICAL & SURGICAL HISTORY:  COPD (chronic obstructive pulmonary disease)      DM (diabetes mellitus)  diet-controlled      PAF (paroxysmal atrial fibrillation)  s/p ablation      Hiatal hernia      H/O aplastic anemia      History of IBS      H/O osteoporosis      HLD (hyperlipidemia)      PMR (polymyalgia rheumatica)      History of basal cell cancer      Chronic kidney disease (CKD)      Hypotension      Presence of IVC filter      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      History of immunodeficiency      Lumbar compression fracture      H/O hiatal hernia      H/O pulmonary fibrosis      H/O sinus bradycardia      History of fracture of right hip  "unoperable"      S/P hysterectomy      S/P foot surgery      S/P knee surgery      After cataract  bilateral eye cataract surgically removed      Closed right hip fracture  rigth hip ORIF july 19 2016      S/P IVC filter  nov 2016      S/P carpal tunnel release  Right 2008 & 6/27/17      H/O kyphoplasty      Port-A-Cath in place  right chest        MEDICATIONS  (STANDING):  albuterol/ipratropium for Nebulization 3 milliLiter(s) Nebulizer every 6 hours  aMIOdarone    Tablet 100 milliGRAM(s) Oral daily  chlorhexidine 2% Cloths 1 Application(s) Topical <User Schedule>  dextrose 5%. 1000 milliLiter(s) (100 mL/Hr) IV Continuous <Continuous>  dextrose 5%. 1000 milliLiter(s) (50 mL/Hr) IV Continuous <Continuous>  dextrose 50% Injectable 25 Gram(s) IV Push once  dextrose 50% Injectable 12.5 Gram(s) IV Push once  dextrose 50% Injectable 25 Gram(s) IV Push once  droxidopa 100 milliGRAM(s) Oral every 8 hours  fluticasone propionate/ salmeterol 500-50 MICROgram(s) Diskus 1 Dose(s) Inhalation two times a day  gabapentin 400 milliGRAM(s) Oral every 12 hours  glucagon  Injectable 1 milliGRAM(s) IntraMuscular once  heparin   Injectable 5000 Unit(s) SubCutaneous every 12 hours  hydrocortisone sodium succinate Injectable 50 milliGRAM(s) IV Push every 12 hours  insulin lispro (ADMELOG) corrective regimen sliding scale   SubCutaneous three times a day before meals  insulin lispro (ADMELOG) corrective regimen sliding scale   SubCutaneous at bedtime  midodrine 10 milliGRAM(s) Oral every 8 hours  montelukast 10 milliGRAM(s) Oral daily  pantoprazole  Injectable 40 milliGRAM(s) IV Push daily  piperacillin/tazobactam IVPB.. 3.375 Gram(s) IV Intermittent every 8 hours  tiotropium 2.5 MICROgram(s) Inhaler 2 Puff(s) Inhalation daily    MEDICATIONS  (PRN):  acetaminophen     Tablet .. 650 milliGRAM(s) Oral every 6 hours PRN Mild Pain (1 - 3)  dextrose Oral Gel 15 Gram(s) Oral once PRN Blood Glucose LESS THAN 70 milliGRAM(s)/deciliter    Allergies    No Known Allergies    Intolerances      Vital Signs Last 24 Hrs  T(C): 36.6 (12 Jun 2025 04:43), Max: 36.8 (11 Jun 2025 21:54)  T(F): 97.9 (12 Jun 2025 04:43), Max: 98.2 (11 Jun 2025 21:54)  HR: 84 (12 Jun 2025 06:30) (60 - 84)  BP: 142/80 (12 Jun 2025 06:30) (106/56 - 142/80)  BP(mean): --  RR: 18 (12 Jun 2025 04:43) (18 - 18)  SpO2: 99% (12 Jun 2025 04:43) (95% - 100%)    Parameters below as of 12 Jun 2025 04:43  Patient On (Oxygen Delivery Method): nasal cannula  O2 Flow (L/min): 2    I&O's Summary    11 Jun 2025 07:01  -  12 Jun 2025 07:00  --------------------------------------------------------  IN: 0 mL / OUT: 400 mL / NET: -400 mL    PHYSICAL EXAM:  TELE: SB at 50's  Constitutional: NAD, awake and alert  HEENT: Moist Mucous Membranes, Anicteric  Pulmonary: Non-labored, breath sounds are clear bilaterally, No wheezing, rales or rhonchi  Cardiovascular: Regular, S1 and S2, No murmurs, rubs, gallops or clicks  Gastrointestinal: Bowel Sounds present, soft, nontender.   Lymph: 1+ edema. No lymphadenopathy.  Skin: No visible rashes or ulcers.  Psych:  Mood & affect appropriate     LABS: All Labs Reviewed:                        8.8    8.82  )-----------( 271      ( 12 Jun 2025 09:20 )             27.7                         7.1    5.92  )-----------( 263      ( 11 Jun 2025 06:45 )             22.6                         7.6    7.40  )-----------( 213      ( 11 Jun 2025 03:30 )             24.2     11 Jun 2025 06:45    141    |  107    |  52     ----------------------------<  104    5.1     |  28     |  1.30   10 Nick 2025 06:40    139    |  102    |  57     ----------------------------<  132    3.1     |  31     |  1.30     Ca    7.6        11 Jun 2025 06:45  Ca    8.4        10 Nick 2025 06:40  Phos  3.8       10 Nick 2025 06:40  Mg     2.0       10 Nick 2025 06:40    TPro  5.7    /  Alb  3.0    /  TBili  0.6    /  DBili  x      /  AST  6      /  ALT  27     /  AlkPhos  33     11 Jun 2025 06:45  TPro  5.7    /  Alb  2.9    /  TBili  0.7    /  DBili  x      /  AST  7      /  ALT  32     /  AlkPhos  39     10 Nick 2025 06:40    PTT - ( 11 Jun 2025 03:30 )  PTT:26.8 sec      12 Lead ECG:   Ventricular Rate 80 BPM    Atrial Rate 80 BPM    P-R Interval 132 ms    QRS Duration 86 ms    Q-T Interval 398 ms    QTC Calculation(Bazett) 459 ms    P Axis 75 degrees    R Axis -35 degrees    T Axis 78 degrees    Diagnosis Line Sinus rhythm with premature supraventricular complexes  Left axis deviation  Possible Lateral infarct , age undetermined  Abnormal ECG  When compared with ECG of 03-JUN-2025 22:27,  premature ventricular complexes are no longer present  Confirmed by ROSALVA GOODRICH ([id number]) on 6/8/2025 9:50:39 PM (06-08-25 @ 10:58)      TRANSTHORACIC ECHOCARDIOGRAM REPORT  ________________________________________________________________________________                                      _______       Pt. Name:       MARTINEZ JONES Study Date:    6/2/2025  MRN:            [id number]        YOB: 1940  Accession #:    [id number]       Age:           84 years  Account#:       [id number]      Gender:        F  Heart Rate:                     Height:        62.99 in (160.00 cm)  Rhythm:                         Weight:        154.32 lb (70.00 kg)  Blood Pressure: 91/55 mmHg      BSA/BMI:       1.73 m² / 27.34 kg/m²  ________________________________________________________________________________________  Referring Physician:    [id number] James Osei  Interpreting Physician: Daniel Jorge M.D.  Primary Sonographer:    Kelsey Monson RDCS    CPT:               ECHO TTE WO CON COMP W DOPP - 75648.m  Indication(s):     Cardiomyopathy, unspecified - I42.9  Procedure:         Transthoracic echocardiogram with 2-D, M-mode and complete                     spectral and color flow Doppler.  Ordering Location: [facility]  Admission Status:  Inpatient  _______________________________________________________________________________________     CONCLUSIONS:      1. Left ventricular systolic function is normal with an ejection fraction of 71 % by Arias's method of disks.   2. Normal right ventricular cavity size, with normal wall thickness, and normal right ventricular systolic function.   3. Left atrium is moderately dilated.   4. Mild mitral regurgitation.   5. Estimated pulmonary artery systolic pressure is 54 mmHg.   6. Mild pulmonic regurgitation.   7. Mild tricuspid regurgitation.   8. Small bilateral pleural effusion noted.   9. There is increased LV mass and concentric hypertrophy.  ________________________________________________________________________________________  FINDINGS:     Left Ventricle:  Left ventricular systolic function is normal with a calculated ejection fraction of 71 % by the Arias's biplane method of disks. Moderate left ventricular hypertrophy. There is increased LV mass and concentric hypertrophy.     Right Ventricle:  The right ventricular cavity is normal in size, with normal wall thickness and right ventricular systolic function is normal.     Left Atrium:  The left atrium is moderately dilated with an indexed volume of 44.98 ml/m².     Right Atrium:  The right atrium is normal in size with an indexed volume of 21.88 ml/m² and an indexed area of 8.72 cm²/m².     Aortic Valve:  The aortic valve is tricuspid with normal leaflet excursion. There is mild thickening of the aortic valve leaflets.     Mitral Valve:  Structurally normal mitral valve with normal leaflet excursion. There is calcification of the mitralvalve annulus. There is mild leaflet calcification. There is mild mitral regurgitation.     Tricuspid Valve:  The tricuspid valve is structurally normal with normal leaflet excursion. There is mild tricuspid regurgitation. Estimated pulmonary artery systolic pressure is 54 mmHg.     Pulmonic Valve:  Structurally normal pulmonic valve with normal leaflet excursion. There is mild pulmonic regurgitation.     Aorta:  The aortic root at the sinuses of Valsalva is normal in size, measuring 3.00 cm (indexed 1.73 cm/m²). The aortic arch diameter is normal in size, measuring 2.4 cm (indexed 1.39 cm/m²).     Pericardium:  There is a trace pericardial effusion.     Pleura:  Small bilateral pleural effusion noted.     Systemic Veins:  The inferior vena cava is normal in size measuring 1.77 cm in diameter, (normal <2.1cm) with abnormal inspiratory collapse (abnormal <50%) consistent with mildly elevated right atrial pressure (~8, range 5-10mmHg).  ____________________________________________________________________  QUANTITATIVE DATA:  Left Ventricle Measurements: (Indexed to BSA)     IVSd (2D):   1.3 cm  LVPWd (2D):  1.2 cm  LVIDd (2D):  4.5 cm  LVIDs (2D):  2.7 cm  LV Mass:     199 g  114.9 g/m²  LV Vol d, MOD A2C: 66.0 ml 38.11 ml/m²  LV Vol d,MOD A4C: 61.7 ml 35.63 ml/m²  LV Vol d, MOD BP:  63.9 ml 36.89 ml/m²  LV Vol s, MOD A2C: 19.4 ml 11.20 ml/m²  LV Vol s, MOD A4C: 16.4 ml 9.47 ml/m²  LV Vol s, MOD BP:  18.2 ml 10.52 ml/m²  LVOT SV MOD BP:    45.7 ml  LV EF% MOD BP:     71 %     MV E Vmax:    1.23 m/s  MV A Vmax:    0.68 m/s  MV E/A:       1.82  e' lateral:   7.51 cm/s  e' medial:    3.05 cm/s  E/e' lateral: 16.38  E/e' medial:  40.33  E/e' Average: 23.30  MV DT:        207 msec    Aorta Measurements: (Normal range) (Indexed to BSA)     Ao Root d 3.00 cm (2.7 - 3.3 cm) 1.73 cm/m²  Ao Arch:  2.4 cm    Left Atrium Measurements: (Indexed to BSA)  LA Diam 2D: 4.10 cm    Right Atrial Measurements:     RA Vol s, MOD A4C         37.9 ml  RA Vol s, MOD A4C i BSA   21.88 ml/m²  RA Area s, MOD A4C        15.1 cm²  RA Area s, MOD A4C, i BSA 8.72 cm²/m²    Mitral Valve Measurements:     MV E Vmax: 1.2 m/s         MR Vmax:          4.19 m/s  MV A Vmax: 0.7 m/s         MR Peak Gradient: 70.2 mmHg  MV E/A:    1.8  Tricuspid Valve Measurements:     TR Vmax:          3.4 m/s  TR Peak Gradient: 45.7 mmHg  RA Pressure:      8 mmHg  PASP:             54 mmHg    ________________________________________________________________________________________  Electronically signedon 6/2/2025 at 1:09:26 PM by Daniel Jorge M.D.         *** Final ***

## 2025-06-12 NOTE — PROGRESS NOTE ADULT - PROBLEM SELECTOR PLAN 7
-chronic recent hospitalization 5/25/25-6/5/25 for AHDF/COPD exacerbation  -Bronchodilators q 6 hrs prn for sob/wheezes  -c/w home med of Advair 500/50 q 12 hrs  -c/w home med tiotropium   -Supplemental O2 prn to maintain SPO2 > 92% -chronic recent hospitalization 5/25/25-6/5/25 for AHDF/COPD exacerbation  -Bronchodilators q 6 hrs prn for sob/wheezes  -c/w home med of Advair 500/50 q 12 hrs  -c/w home med tiotropium   -Supplemental O2 prn to maintain SPO2 > 92% -- taper o2

## 2025-06-12 NOTE — DISCHARGE NOTE PROVIDER - NSDCCPCAREPLAN_GEN_ALL_CORE_FT
PRINCIPAL DISCHARGE DIAGNOSIS  Diagnosis: Pneumonia  Assessment and Plan of Treatment: Pneumonia is an infection of the lungs. You were treated with IV antibiotics.  - PLEASE START ___  SEEK IMMEDIATE MEDICAL CARE IF YOU HAVE ANY OF THE FOLLOWING SYMPTOMS: worsening shortness of breath, worsening chest pain, coughing up blood, change in mental status, lightheadedness/dizziness.   Please follow up with your PCP within one week after discharge for further management.      SECONDARY DISCHARGE DIAGNOSES  Diagnosis: Aplastic anemia  Assessment and Plan of Treatment: You were found to have aplastic anemia.   You were given ____ PRBC.  Please follow up with your PCP within one week after discharge for further management.     PRINCIPAL DISCHARGE DIAGNOSIS  Diagnosis: Pneumonia  Assessment and Plan of Treatment: Pneumonia is an infection of the lungs. You were seen by pulmonology and infectious disease and treated with IV antibiotics, high dose steroids, and nebulizers.  SEEK IMMEDIATE MEDICAL CARE IF YOU HAVE ANY OF THE FOLLOWING SYMPTOMS: worsening shortness of breath, worsening chest pain, coughing up blood, change in mental status, lightheadedness/dizziness.   Please follow up with your PCP within one week after discharge for further management.      SECONDARY DISCHARGE DIAGNOSES  Diagnosis: Hemoptysis  Assessment and Plan of Treatment: You were diagnosed with hemoptysis (coughing with blood) during your hospitalization.   You had a bronchoscopy done which showed a small collection of blood in the top part of the right lung, but there was no site of active bleeding. We observed your blood level and monitored your cough, once hemoptysis resolved, we reinstated your eliquis at a lower dose.    Diagnosis: Aplastic anemia  Assessment and Plan of Treatment: You were found to have aplastic anemia.   You were given a total of 5 units of PRBC over the course of hospitaliazation.   Please follow up with your PCP within one week after discharge for further management.     PRINCIPAL DISCHARGE DIAGNOSIS  Diagnosis: Pneumonia  Assessment and Plan of Treatment: Pneumonia is an infection of the lungs. You were seen by pulmonology and infectious disease and treated with IV antibiotics, high dose steroids, and nebulizers.  Please follow up with your Pulmonologist, Dr. Baxter, in 1-2 weeks.  Please follow up with your Primary Care Physician (PCP), Dr. Rodas, in 1-2 weeks.  Please return to the emergency room for further evaulation if you experience the following symptoms: worsening shortness of breath, worsening chest pain, coughing up blood, change in mental status, lightheadedness/dizziness.      SECONDARY DISCHARGE DIAGNOSES  Diagnosis: Adrenal insufficiency  Assessment and Plan of Treatment: You were diagnosed with adrenal insufficiency. This is when the adrenal glands fail to produce hormones that regulate blood pressure. This can happen due to chronic steroid use, which you take for your Polymyalgia Rheumatica. You were prescribed a high dose of Steroids to compensate for this deficiency. You will be slowly transitioned back to your normal dose. Take the following doses on the following days:  7/4 until 7/7: predniSONE 5 mg oral tablet: 2.5 tab(s) orally once a day (total 12.5 mg)  7/8 until 7/14: predniSONE 5 mg oral tablet: 2 tab(s) orally once a day   (total 10 mg)  7/15 until 7/21: predniSONE 5 mg oral tablet: 1.5 tab(s) orally once a day (total 7.5 mg)  7/22 onwards: predniSONE 5 mg oral tablet: 1 tab(s) orally once a day   (total 5 mg - baseline dose)   Please follow up with your Endocrinologist, Dr. Perlman, in 1-2 weeks to monitor your adrenal insufficiency.    Diagnosis: Hemoptysis  Assessment and Plan of Treatment: You were diagnosed with hemoptysis (coughing with blood) during your hospitalization. You had a bronchoscopy done, which showed a small collection of blood in the top part of the right lung, but there was no site of active bleeding.   We stopped your home dose of Eliquis during the hospitalization. We started it prior to discharge. Please start taking Eliquis 2.5mg twice a day.  Please follow up with your Pulmonologist, Dr. Baxter, in 1-2 weeks.    Diagnosis: Atrial fibrillation  Assessment and Plan of Treatment: You have chronic atrial fibrillation. Please continue Amiodarone 100mg daily. Please continue Eliquis 2.5mg daily. Please continue Torsemide 20 mg daily.   Please follow up with you Cardiologist, Dr. Stout, in 1-2 weeks.    Diagnosis: Aplastic anemia  Assessment and Plan of Treatment: You were found to have aplastic anemia. This is when your body makes less red blood cells, white blood cells, and platelets. You were given a total of 7 units of PRBC over the course of hospitaliazation.   Please follow up with your Hematologist/Oncologist, Dr. Case, in 1-2 weeks.     PRINCIPAL DISCHARGE DIAGNOSIS  Diagnosis: Pneumonia  Assessment and Plan of Treatment: Pneumonia is an infection of the lungs. You were seen by pulmonology and infectious disease and treated with IV antibiotics, high dose steroids, and nebulizers. You had complications throughout your hospital stay which required you to go to the ICU. However your respiratory status improved with treatement.   Please continue taking your steroids and nebulizers as prescribed.   Please follow up with your Pulmonologist, Dr. Baxter, in 1-2 weeks.  Please follow up with your Primary Care Physician (PCP), Dr. Rodas, in 1-2 weeks.  Please return to the emergency room for further evaulation if you experience the following symptoms: worsening shortness of breath, worsening chest pain, coughing up blood, change in mental status, lightheadedness/dizziness.      SECONDARY DISCHARGE DIAGNOSES  Diagnosis: Aplastic anemia  Assessment and Plan of Treatment: You were found to have aplastic anemia. This is when your body makes less red blood cells, white blood cells, and platelets. You were given a total of 7 units of PRBC over the course of hospitaliazation.   Please follow up with your Hematologist/Oncologist, Dr. Case, in 1-2 weeks.    Diagnosis: Hemoptysis  Assessment and Plan of Treatment: You were diagnosed with hemoptysis (coughing with blood) during your hospitalization. You had a bronchoscopy done, which showed a small collection of blood in the top part of the right lung, but there was no site of active bleeding.   We stopped your home dose of Eliquis during the hospitalization. We started it prior to discharge. Please start taking Eliquis 2.5mg twice a day.  Please follow up with your Pulmonologist, Dr. Baxter, in 1-2 weeks.    Diagnosis: Adrenal insufficiency  Assessment and Plan of Treatment: You were diagnosed with adrenal insufficiency. This is when the adrenal glands fail to produce hormones that regulate blood pressure. This can happen due to chronic steroid use, which you take for your Polymyalgia Rheumatica. You were prescribed a high dose of Steroids to compensate for this deficiency. You will be slowly transitioned back to your normal dose. Take the following doses on the following days:  7/4 until 7/7: predniSONE 5 mg oral tablet: 2.5 tab(s) orally once a day (total 12.5 mg)  7/8 until 7/14: predniSONE 5 mg oral tablet: 2 tab(s) orally once a day   (total 10 mg)  7/15 until 7/21: predniSONE 5 mg oral tablet: 1.5 tab(s) orally once a day (total 7.5 mg)  7/22 onwards: predniSONE 5 mg oral tablet: 1 tab(s) orally once a day   (total 5 mg - baseline dose)   Please follow up with your Endocrinologist, Dr. Perlman,  and rheumatologist in 1-2 weeks to monitor your adrenal insufficiency.    Diagnosis: Atrial fibrillation  Assessment and Plan of Treatment: You have chronic atrial fibrillation. Please continue Amiodarone 100mg daily. Please continue Eliquis 2.5mg daily. Please continue Torsemide 20 mg daily.   Please follow up with you Cardiologist, Dr. Stout, in 1-2 weeks.     PRINCIPAL DISCHARGE DIAGNOSIS  Diagnosis: Pneumonia  Assessment and Plan of Treatment: severe sepsis with septic shock 2/2 pneumonia . Pneumonia is an infection of the lungs. You were seen by pulmonology and infectious disease and treated with IV antibiotics, high dose steroids, and nebulizers. You had complications throughout your hospital stay which required you to go to the ICU. However your respiratory status improved with treatement.   Please continue taking your steroids and nebulizers as prescribed.   Please follow up with your Pulmonologist, Dr. Baxter, in 1-2 weeks.  Please follow up with your Primary Care Physician (PCP), Dr. Rodas, in 1-2 weeks.  Please return to the emergency room for further evaulation if you experience the following symptoms: worsening shortness of breath, worsening chest pain, coughing up blood, change in mental status, lightheadedness/dizziness.      SECONDARY DISCHARGE DIAGNOSES  Diagnosis: Aplastic anemia  Assessment and Plan of Treatment: You were found to have aplastic anemia. This is when your body makes less red blood cells, white blood cells, and platelets. You were given a total of 7 units of PRBC over the course of hospitaliazation.   Please follow up with your Hematologist/Oncologist, Dr. Case, in 1-2 weeks.    Diagnosis: Hemoptysis  Assessment and Plan of Treatment: You were diagnosed with hemoptysis (coughing with blood) during your hospitalization. You had a bronchoscopy done, which showed a small collection of blood in the top part of the right lung, but there was no site of active bleeding.   We stopped your home dose of Eliquis during the hospitalization. We started it prior to discharge. Please start taking Eliquis 2.5mg twice a day.  Please follow up with your Pulmonologist, Dr. Baxter, in 1-2 weeks.    Diagnosis: Adrenal insufficiency  Assessment and Plan of Treatment: You were diagnosed with adrenal insufficiency. This is when the adrenal glands fail to produce hormones that regulate blood pressure. This can happen due to chronic steroid use, which you take for your Polymyalgia Rheumatica. You were prescribed a high dose of Steroids to compensate for this deficiency. You will be slowly transitioned back to your normal dose. you were seen by endocrinologist. suggested slow taper to home dose in 2-3 weeks:   Take the following doses on the following days:  7/4 at night : prednisone 7.5mg   (patient received IV hydrocortisone in morning  on 7/4 prior to discharge)  7/5 until 7/7: predniSONE 5 mg oral tablet: 2.5 tab(s) orally once a day (total 12.5 mg)  7/8 until 7/14: predniSONE 5 mg oral tablet: 2 tab(s) orally once a day   (total 10 mg)  7/15 until 7/21: predniSONE 5 mg oral tablet: 1.5 tab(s) orally once a day (total 7.5 mg)  7/22 onwards: predniSONE 5 mg oral tablet: 1 tab(s) orally once a day   (total 5 mg - baseline dose)   Please follow up with your Endocrinologist, Dr. Perlman,  and rheumatologist in 1-2 weeks to monitor your adrenal insufficiency.    Diagnosis: Atrial fibrillation  Assessment and Plan of Treatment: You have chronic atrial fibrillation. Please continue Amiodarone 100mg daily. Please continue Eliquis 2.5mg daily. Please continue Torsemide 20 mg daily.   Please follow up with you Cardiologist, Dr. Stout, in 1-2 weeks.    Diagnosis: Acute on chronic respiratory failure with hypoxia and hypercapnia  Assessment and Plan of Treatment: continue NC oxygen supply 2-3L via NC during the day   AVAPS AT NIGHT and PRN SETTING: TIME 20:00 TO 7AM   Fio2 45%  EP 8 cmH2O  TV 450ML  Back up rate 14BPM   maximum pressure 25 cmH2O  minimum pressure 10 cmH2O  targeted SPO2 88-97%       Diagnosis: Hypomagnesemia  Assessment and Plan of Treatment: magnesium supplement    Diagnosis: Hypophosphatemia  Assessment and Plan of Treatment: potassium phosphorus  supplement, re check electrolytes suggested in one week

## 2025-06-12 NOTE — DISCHARGE NOTE PROVIDER - NSDCCAREPROVSEEN_GEN_ALL_CORE_FT
Claudia Wasserman Jose Maldonado Joel, Jose Lopez, Nela Gibbs, Tl Stout, Neeral Perlman, Joo Clark, Antonio Chisholm, Henrique

## 2025-06-12 NOTE — CARE COORDINATION ASSESSMENT. - OTHER PERTINENT DISCHARGE PLANNING INFORMATION:
Met with patient at bedside. Role of CM/transition planning explained. Patient verbalizes understanding. Transition information provided. Patient lives in pvrivate ranch style house w/ ramp to enter, lives w/  & son. Ambulates w/ RW and uses W/C for long distance out of home. Owns home O2 from Comm [facility] for many years but only began using recently. Has home conc & POC. No HCS present PTA. Pt aware & agrees with [facility] and chose [facility]. Daughter will transport home. CM contact information provided. CM will continue to follow throughout hospital stay. 
room air

## 2025-06-12 NOTE — PROGRESS NOTE ADULT - SUBJECTIVE AND OBJECTIVE BOX
[facility]                                                       Dr. Chisholm, Dr. Franklin, Dr. Baron, Dr. Clark, Dr. Stock                                      [facility], [facility], [address]                                                 [address] [place] [address]                                      Ph: [phone]  Fax: [phone]                                         Ph: [phone]  Fax: [phone]      Patient is a 84y old  Female who presents with a chief complaint of septic shock, PNA (09 Jun 2025 12:36)  Patient seen in follow up for KIMBERLY on CKD>        PAST MEDICAL HISTORY:  COPD (chronic obstructive pulmonary disease)    DM (diabetes mellitus)    Pulmonary fibrosis    PAF (paroxysmal atrial fibrillation)    Hiatal hernia    GIB (gastrointestinal bleeding)    Pericarditis    Shoulder fracture, right    Hematoma    H/O aplastic anemia    History of IBS    H/O osteoporosis    HLD (hyperlipidemia)    PMR (polymyalgia rheumatica)    History of basal cell cancer    Chronic kidney disease (CKD)    Hypotension    Presence of IVC filter    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    History of immunodeficiency    Lumbar compression fracture    H/O hiatal hernia    H/O pulmonary fibrosis    H/O sinus bradycardia    History of fracture of right hip      MEDICATIONS  (STANDING):  albuterol/ipratropium for Nebulization 3 milliLiter(s) Nebulizer every 6 hours  aMIOdarone    Tablet 100 milliGRAM(s) Oral daily  chlorhexidine 2% Cloths 1 Application(s) Topical <User Schedule>  dextrose 5%. 1000 milliLiter(s) (50 mL/Hr) IV Continuous <Continuous>  dextrose 5%. 1000 milliLiter(s) (100 mL/Hr) IV Continuous <Continuous>  dextrose 50% Injectable 25 Gram(s) IV Push once  dextrose 50% Injectable 12.5 Gram(s) IV Push once  dextrose 50% Injectable 25 Gram(s) IV Push once  droxidopa 100 milliGRAM(s) Oral every 8 hours  fluticasone propionate/ salmeterol 500-50 MICROgram(s) Diskus 1 Dose(s) Inhalation two times a day  gabapentin 400 milliGRAM(s) Oral every 12 hours  glucagon  Injectable 1 milliGRAM(s) IntraMuscular once  heparin  Infusion.  Unit(s)/Hr (12 mL/Hr) IV Continuous <Continuous>  hydrocortisone sodium succinate Injectable 50 milliGRAM(s) IV Push every 12 hours  insulin lispro (ADMELOG) corrective regimen sliding scale   SubCutaneous three times a day before meals  insulin lispro (ADMELOG) corrective regimen sliding scale   SubCutaneous at bedtime  midodrine 10 milliGRAM(s) Oral every 8 hours  montelukast 10 milliGRAM(s) Oral daily  pantoprazole  Injectable 40 milliGRAM(s) IV Push daily  piperacillin/tazobactam IVPB.. 3.375 Gram(s) IV Intermittent every 8 hours  tiotropium 2.5 MICROgram(s) Inhaler 2 Puff(s) Inhalation daily    MEDICATIONS  (PRN):  acetaminophen     Tablet .. 650 milliGRAM(s) Oral every 6 hours PRN Mild Pain (1 - 3)  dextrose Oral Gel 15 Gram(s) Oral once PRN Blood Glucose LESS THAN 70 milliGRAM(s)/deciliter  heparin   Injectable 5500 Unit(s) IV Push every 6 hours PRN For aPTT less than 40  heparin   Injectable 2500 Unit(s) IV Push every 6 hours PRN For aPTT between 40 - 57    T(C): 36.6 (06-12-25 @ 12:03), Max: 36.9 (06-10-25 @ 21:38)  HR: 67 (06-12-25 @ 12:03) (60 - 84)  BP: 116/72 (06-12-25 @ 12:03) (106/56 - 142/80)  RR: 18 (06-12-25 @ 12:03)  SpO2: 97% (06-12-25 @ 12:03)  Wt(kg): --  I&O's Detail    11 Jun 2025 07:01  -  12 Jun 2025 07:00  --------------------------------------------------------  IN:  Total IN: 0 mL    OUT:    Voided (mL): 400 mL  Total OUT: 400 mL    Total NET: -400 mL                    PHYSICAL EXAM:  General: No distress  Respiratory: b/l air entry  Cardiovascular: S1 S2  Gastrointestinal: soft  Extremities:  edema                                                                                    [facility]                                                       Dr. Chisholm, Dr. Franklin, Dr. Baron, Dr. Clark, Dr. Stock                                      [facility], [facility], [address]                                                 [address]                                      [place], [place] [address] [place] [address]                                      Ph: [phone]  Fax: [phone]                                         Ph: [phone]  Fax: [phone]      Patient is a 84y old  Female who presents with a chief complaint of septic shock, PNA (12 Jun 2025 09:58)    Patient seen in follow up for       PAST MEDICAL HISTORY:  COPD (chronic obstructive pulmonary disease)    DM (diabetes mellitus)    Pulmonary fibrosis    PAF (paroxysmal atrial fibrillation)    Hiatal hernia    GIB (gastrointestinal bleeding)    Pericarditis    Shoulder fracture, right    Hematoma    H/O aplastic anemia    History of IBS    H/O osteoporosis    HLD (hyperlipidemia)    PMR (polymyalgia rheumatica)    History of basal cell cancer    Chronic kidney disease (CKD)    Hypotension    Presence of IVC filter    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    History of immunodeficiency    Lumbar compression fracture    H/O hiatal hernia    H/O pulmonary fibrosis    H/O sinus bradycardia    History of fracture of right hip      MEDICATIONS  (STANDING):  albuterol/ipratropium for Nebulization 3 milliLiter(s) Nebulizer every 6 hours  aMIOdarone    Tablet 100 milliGRAM(s) Oral daily  chlorhexidine 2% Cloths 1 Application(s) Topical <User Schedule>  dextrose 5%. 1000 milliLiter(s) (50 mL/Hr) IV Continuous <Continuous>  dextrose 5%. 1000 milliLiter(s) (100 mL/Hr) IV Continuous <Continuous>  dextrose 50% Injectable 25 Gram(s) IV Push once  dextrose 50% Injectable 12.5 Gram(s) IV Push once  dextrose 50% Injectable 25 Gram(s) IV Push once  droxidopa 100 milliGRAM(s) Oral every 8 hours  fluticasone propionate/ salmeterol 500-50 MICROgram(s) Diskus 1 Dose(s) Inhalation two times a day  gabapentin 400 milliGRAM(s) Oral every 12 hours  glucagon  Injectable 1 milliGRAM(s) IntraMuscular once  heparin  Infusion.  Unit(s)/Hr (12 mL/Hr) IV Continuous <Continuous>  hydrocortisone sodium succinate Injectable 50 milliGRAM(s) IV Push every 12 hours  insulin lispro (ADMELOG) corrective regimen sliding scale   SubCutaneous three times a day before meals  insulin lispro (ADMELOG) corrective regimen sliding scale   SubCutaneous at bedtime  midodrine 10 milliGRAM(s) Oral every 8 hours  montelukast 10 milliGRAM(s) Oral daily  pantoprazole  Injectable 40 milliGRAM(s) IV Push daily  piperacillin/tazobactam IVPB.. 3.375 Gram(s) IV Intermittent every 8 hours  tiotropium 2.5 MICROgram(s) Inhaler 2 Puff(s) Inhalation daily    MEDICATIONS  (PRN):  acetaminophen     Tablet .. 650 milliGRAM(s) Oral every 6 hours PRN Mild Pain (1 - 3)  dextrose Oral Gel 15 Gram(s) Oral once PRN Blood Glucose LESS THAN 70 milliGRAM(s)/deciliter  heparin   Injectable 5500 Unit(s) IV Push every 6 hours PRN For aPTT less than 40  heparin   Injectable 2500 Unit(s) IV Push every 6 hours PRN For aPTT between 40 - 57    T(C): 36.6 (06-12-25 @ 12:03), Max: 36.9 (06-11-25 @ 05:28)  HR: 67 (06-12-25 @ 12:03) (60 - 84)  BP: 116/72 (06-12-25 @ 12:03) (106/56 - 142/80)  RR: 18 (06-12-25 @ 12:03) (18 - 18)  SpO2: 97% (06-12-25 @ 12:03) (93% - 100%)  Wt(kg): --  I&O's Detail    11 Jun 2025 07:01  -  12 Jun 2025 07:00  --------------------------------------------------------  IN:  Total IN: 0 mL    OUT:    Voided (mL): 400 mL  Total OUT: 400 mL    Total NET: -400 mL          PHYSICAL EXAM:  General: No distress  Respiratory: b/l air entry  Cardiovascular: S1 S2  Gastrointestinal: soft  Extremities:  edema                              8.8    8.82  )-----------( 271      ( 12 Jun 2025 09:20 )             27.7     06-12    139  |  104  |  49[H]  ----------------------------<  177[H]  4.4   |  28  |  1.30    Ca    8.6      12 Jun 2025 09:20  Phos  2.0     06-12  Mg     1.7     06-12    TPro  5.8[L]  /  Alb  3.1[L]  /  TBili  0.7  /  DBili  x   /  AST  <3[L]  /  ALT  23  /  AlkPhos  37[L]  06-12        LIVER FUNCTIONS - ( 12 Jun 2025 09:20 )  Alb: 3.1 g/dL / Pro: 5.8 g/dL / ALK PHOS: 37 U/L / ALT: 23 U/L / AST: <3 U/L / GGT: x           Urinalysis Basic - ( 12 Jun 2025 09:20 )    Color: x / Appearance: x / SG: x / pH: x  Gluc: 177 mg/dL / Ketone: x  / Bili: x / Urobili: x   Blood: x / Protein: x / Nitrite: x   Leuk Esterase: x / RBC: x / WBC x   Sq Epi: x / Non Sq Epi: x / Bacteria: x      ABG - ( 10 Nick 2025 17:30 )  pH, Arterial: 7.47  pH, Blood: x     /  pCO2: 45    /  pO2: 127   / HCO3: 33    / Base Excess: 9.1   /  SaO2: 99.1              Sodium, Serum: 139 (06-12 @ 09:20)  Sodium, Serum: 141 (06-11 @ 06:45)  Sodium, Serum: 139 (06-10 @ 06:40)  Sodium, Serum: 137 (06-09 @ 05:48)    Creatinine, Serum: 1.30 (06-12 @ 09:20)  Creatinine, Serum: 1.30 (06-11 @ 06:45)  Creatinine, Serum: 1.30 (06-10 @ 06:40)  Creatinine, Serum: 1.40 (06-09 @ 05:48)  Creatinine, Serum: 2.00 (06-08 @ 19:35)    Potassium, Serum: 4.4 (06-12 @ 09:20)  Potassium, Serum: 5.1 (06-11 @ 06:45)  Potassium, Serum: 3.1 (06-10 @ 06:40)  Potassium, Serum: 3.8 (06-09 @ 05:48)    Hemoglobin: 8.8 (06-12 @ 09:20)  Hemoglobin: 7.1 (06-11 @ 06:45)  Hemoglobin: 7.6 (06-11 @ 03:30)  Hemoglobin: 7.4 (06-10 @ 20:29)

## 2025-06-12 NOTE — PROGRESS NOTE ADULT - ASSESSMENT
REASON FOR VISIT  .. Management of problems listed below      EVENTS/CURRENT ISSUES.  . 6/11/2025 qft funfitell and galactomannan orderd   . 6/11/2025 pt wa sstarted on iv ufh for hemoptysis and pleuritic chest pain but was stopped when vq showed vlp   . 6/10/2025 Mild hemoptysis   . 6/9 tr out of icu  . 6/8/2025  . PNEUMONIA RML 6/8/2025  . SHOCK 6/8/2025   . NOREPI 6/8/2025   . STRESS STEROIDS   .... HYDROCORTISONE 6/8/2025  . A fib (chronic) POA 6/8/2025  . ANEMIA Hb 6/8/2025 Hb 7.5  . KIMBERLY ON CKD Cr 6/8/2025 Cr 1.9        REVIEW OF SYMPTOMS   Able to give ROS  Yes     RELIABILITY +/-   CONSTITUTIONAL Weakness Yes    ENDOCRINE  No heat or cold intolerance    ALLERGY No hives  Sore throat No stridor  RESP Shortness of breath YES   NEURO New weakness No   CARDIAC   Palpitations No         PHYSICAL EXAM    HEENT Unremarkable  atraumatic   RESP Fair air entry  Harsh breath sound   CARDIAC S1 S2 No S3     NO JVD    ABDOMEN No hepatosplenomegaly   PEDAL EDEMA present No calf tenderness    REASON FOR VISIT  .. Management of problems listed below      CC.   . 6/8/2025 brought in by ems for right sided chest pain that started at 3am- nonradiating- patient was offered aspirin by ems- patient refused and stated to ems that she is on plavix and was advised not to take aspirin. patient on 43 litres nasl cannula-     OVERALL PRESENTATION.  . 6/8/2025 84 yr old female with PMHx afib on eliquis, COPD (not on home O2), PE/Rt lower extremity DVT 2017, Rt LE IVC filter 2017 CKD3, aplastic anemia, polymyalgia rheumatica on prednisone 5 mg po qd, requiring PRBC transfusions ~8 weeks (via Rt subclavian mediport), AVN bilat hips, HTN, HLD, HFpEF (75% 6.2.25), diastolic dysfx grade1, recent hospitalization 5/25/25-6/5/25 for ADHF/COPD exacerbation, Pt with eventual improvement and transfer to [facility] facility from where she presents to [facility] this a.mLulu 6/8/25 with c/o Rt sided chest pain. general malaise. Pt stated began to feel ill evening before presentation, daughter this a.m. endorsed pt lethargic, pale.     In E.D. Pt found to have fever with tmax of 101, KIMBERLY on CKD with sCr 1.90 (baseline 1.2-1.6), HYPOtnsive with SBP 80's (pt on midodrine therapy, usual 's). In addition found to have leukocytosis of 37.6 (baseline 5.25 6/5/25), procalcitonin of 13.6, Hgb 7.5, elevated INR 2.27,  Chest xray demonstrated RML consolidations, concerning for pneum. In E.D. pt receiving 500 cc's NS, Vanco 1 gm, zosyn. Pt received tylenol 1 gm IV x1.       BEST PRACTICE ISSUES.  . HOB ELEVATN.    .... Yes  . DIET  .   .... DASH 6/8/2025   . FREE WATER.   ....   .  IV FLUID.  ..... 6/8 lr 40 x 12 h   . PHARMAC DVT PPLX .    .... 6/12/2025 iv ufh (hospitalist)  .... 6/11/2025 [facility] 5k.2   .... 6/10/2025 4:46 PM iv ufh   .... [facility] 6/9-6/10/2025   . NON PHARMAC DVT PPLX .      . STRESS ULCR PPLX .   ....   . DATE/DM MGMT.   ..... See under Endocrine section   GENERAL DATA .   . COVID.         .... scv2 6/8/2025 scv2 (-)   . GOC.    ....    . ICU STAY.    .... no   . INFECTION PPLX .   .... CHLORHEXIDINE 2% 6/8/2025   . ALLGY.   ....  nka  . WT.   .... 6/8/2025 69   . BMI.  ....6/8/2025 26    XXXXXXXXXXXXXXXXXXXXXXX  VITALS/GAS EXCHANGE/DRIPS    ABGS.     .  VS/ PO/IO/ VENT/ DRIPS.  6/12/2025 afeb 79 120/60   6/12/2025 1l 92%    XXXXXXXXXXXXXXXXXXXXXXXXXXXXXXXXXXX  PROBLEM ASSESSMENT RECOMMENDATIONS.  RESP.   . GAS EXCHANGE .   .... target PO 90-95%     . COPD   .... ADVAIR 500 6/8/2025   .... MONTELUKAST 10 6/8/2025   .... SPIRIVA 6/8/2025     . RESP FAILURE  .... 6/8/2025 Has ho hypercapnia   .... 6/8/2025 recommend monitor abg    . MILD HEMOPTYSIS 6/10/2025   .... 6/10/2025 check ct   .... 6/10/2025  dw hemat cardio id and instead of restarting apixa will start iv UFH which can be easily reversed in case Hb starts dropping but will be the rx for venous thromboembolism as well as for a fib     . PLEURITIC CHEST PAIN RO VTE   .... v duplx 6/10 (-)  .... vq 6/10 vlp   .... 6/12/2025 iv ufh was stopped 6/11 as vq vlp but was restarted by hospitalist 6/12/2025 for af       INFECTION.  . DATA  .... pr 6/8-6/10/2025 or 13.6 - 9.2   .... esr 6/8/2025 esr 17   .... w 6/8-6/9/2025 w 32 - 24   .... ua 6/8/2025 w 4   .... cxr 6/8/2025 cxr dense infiltra r upper hilum r mediport  .... ct ch 6/10 cw 5/31  ........ r greater than l effs   ........ partial rll atelectasis   ........ new patchy and nodular areas of consolidation rul and associated ground glass opacities   ........ ground glass and nodular opac also scott   ........ numerous tib rml and rll   .... flu ab 6/8/2025 (-)   .... rsv 6/8/2025 (-)    .... mrsa 6/9/2025 (-)     . PNEUMONIA RUL 6/8/2025   .... AZITHRO 6/8-6/11 /2025   .... ZOSYN 6/8/2025     CARDIAC.  . PAIN CHEST   .... 6/10/2025 co pain chest r   .... 6/10/2025 check ct to see if she has pleurisy   .... 6/10/2025  dw hemat cardio id and instead of restarting apixa will start iv UFH which can be easily reversed in case Hb starts dropping but will be the rx for venous thromboembolism as well as for a fib     . STRESS STEROIDS   .... HYDROCORTISONE   ........ 6/8/2025 h 50.4  ........ 6/10/2025 h 50.2   ........ 6/12/2025 hydrocort 50     . SHOCK   .... MIDODRINE 6/8/2025 m 10.3   .... DROXIDOPA 6/9/2025 d 100.3   .... NOREPI 6/8/2025 n 8/250   .... target map 65 (+)     . CAD    .... Trop 6/8-6/9/2025 Tr 32- 24     . LACTICEMIA   .... la 6/8/2025 la 1.4     . CHF  .... pbnp 6/8/2025 pbnp 6599   .... tte 4/2025 mild ph  n vf  .... tte 6/2/2025   ........ ef 71%   ........ n rvsf    ........ pasp 54   ....... la mod dilatd     . A fib (chronic) POA 6/8/2025  .... AMIODARONE 100 6/8/2025  .... 6/10/2025  dw hemat cardio id and instead of restarting apixa will start iv UFH which can be easily reversed in case Hb starts dropping but will be the rx for venous thromboembolism as well as for a fib   .... 6/12/2025 iv ufh     HEMAT.  . DATA  .... Plt 6/8/2025 plt 153   .... inr 6/8-6/9/2025 inr 2.27- 1.63      . ANEMIA   .... Hb 6/8-6/9-6/10-6/11/2025 Hb 7.5- 7.8 - 7.6- 7.1     . TRANSFUSION  .... 6/8  1 u prbc      GI.   . DIET .   .... dash 6/8/2025     . LFT MONITORING   .... LFTS    6/8/2025  ........ AP     39  ........ AST   11  ........ ALT    35    RENAL.  . DATA  .... Na 6/8/2025 Na 137  .... K 6/8/2025 K 4   .... CO2 6/8/2025 co2 29   .... ag 6/8/2025 ag 9  .... alb 6/8/2025 alb 3.4     . KIMBERLY ON CKD   .... Cr 6/8-6/9-6/10-6/11/2025 Cr 1.9 - 1.4 - 1.3 - 1.3   .... Cr 5/27-5/28-5/29-5/30-6/1/2025   .... Cr 1.2 - 1.3 - 1.3 - 1.6 - 1.6    ENDO.  . DM  .  .... 6/8/2025 SL SCALE     NEURO.  . PAIN  .... GABAPENTIN 6/8/2025 g 400.2       XXXXXXXXXXXXXXXXXXXXXX   SUMMARY BASELINE .     84 yr old female pt of Dr Gallegos not on home ox or home cpap used to use trelegy lives with spouse quit 40 y 1/2 ppd with PMHx afib on eliquis, COPD (not on home O2), PE/Rt lower extremity DVT 2017, Rt LE IVC filter 2017 CKD3, aplastic anemia, polymyalgia rheumatica on prednisone 5 mg po qd, requiring PRBC transfusions around 8 weeks (via Rt subclavian mediport), AVN bilat hips, HTN, HLD, HFpEF (75% 6.2.25), diastolic dysfx grade1, recent hospitalization 5/25/25-6/5/25 for ADHF/COPD exacerbation, Pt with eventual improvement and transfer to [facility] facility   CC.   . 6/8/2025 R CHEST PAIN   MAIN ISSUES.  . COPD   . MILD HEMOPTYSOIS 6/10/2025   . PNEUMONIA RML 6/8/2025  . PLEURITIS CHEST PAIN 6/10/2025  .... 6/10 vte excluded vlp vq   . SHOCK 6/8/2025   .... resolvd tr oo icu 6/9   . NOREPI 6/8-6/9/2025   . STRESS STEROIDS   .... HYDROCORTISONE 6/8/2025  . A fib (chronic) POA 6/8/2025  .... 6/10-6/10/2025 IV UFH   . ANEMIA Hb 6/8/2025 Hb 7.5  . TRANSFUSION  .... 6/8  1 u prbc    . KIMBERLY ON CKD Cr 6/8/2025 Cr 1.9  PMH.   . AFon Eliquis,   . COPD (not on home o2),   . CKD,   . aplastic anemia,   . TRANSFUSION 6/2/2025 1 u prbc   . PMR (chronic prednisone with AVN hip),   . HLD,   . HTN,   . DM    PROCEDURES/DEVICES .  PAST PROCEDURES   . r mediport poa 6/8/2025     DISCUSSIONS.  .... Discussed with primary care and relevant consultants on an ongoing basis       TIME SPENT.  . Over 36 minutes aggregate care time spent on encounter; activities included   direct patient care, counseling and/or coordinating care reviewing notes, lab data/ imaging , discussion with multidisciplinary team/ patient  /family and explaining in detail risks, benefits, alternatives  of the recommendations     ROBERT HENRIQUEZ 84 6/8/2025 1942

## 2025-06-12 NOTE — PROGRESS NOTE ADULT - PROBLEM SELECTOR PLAN 2
- The patient is noted to have blood-tinged sputum  - requiring PRBC transfusions ~8 weeks (via Rt subclavian mediport)  - Hgb 7.6 (baseline appears to be ~8)   - transfuse <7-7.5  - Will hold Eliquis until clinical improvement is noted  - 6/11 1 unit pRBC, continue to hold eliquis - The patient is noted to have blood-tinged sputum  - requiring PRBC transfusions ~8 weeks (via Rt subclavian mediport)  - Hgb 7.6 (baseline appears to be ~8)   - transfuse <7-7.5  - Will hold Eliquis until clinical improvement is noted  - 6/11 1 unit pRBC, continue to hold eliquis  - 6/12 heparin gtt

## 2025-06-12 NOTE — DISCHARGE NOTE PROVIDER - CARE PROVIDER_API CALL
Severiano Rodas  Internal Medicine  [address] [place] [address]  Phone: [phone]  Fax: [phone]  Follow Up Time:     Rashid Stout  Cardiology  [address] [place] [address]  Phone: [phone]  Fax: [phone]  Follow Up Time:    Severiano Rodas  Internal Medicine  [address] [place] [address]  Phone: [phone]  Fax: [phone]  Follow Up Time:     Rashid Stout  Cardiovascular Disease  [address] [place] [address]  Phone: [phone]  Fax: [phone]  Follow Up Time:     Jonathan Baxter  Pulmonary Disease  [facility] [place] [address]  Phone: [phone]  Fax: [phone]  Follow Up Time:     Hon Oni Case  Hematology & Oncology  [address] [facility], [address]  [place] [address]  Phone: [phone]  Fax: [phone]  Follow Up Time:     Perlman, Craig Douglas  Endocrinology Diabetes and Metabolism  [address] [address]  Phone: [phone]  Fax: [phone]  Follow Up Time:    Severiano Rodas  Internal Medicine  [address] [place] [address]  Phone: [phone]  Fax: [phone]  Follow Up Time: 1 week    Rashid Stout  Cardiovascular Disease  [address] [place] [address]  Phone: [phone]  Fax: [phone]  Follow Up Time: 2 weeks    Jonathan Baxter  Pulmonary Disease  [facility] [place] [address]  Phone: [phone]  Fax: [phone]  Follow Up Time: 2 weeks    Hon Oni Case  Hematology & Oncology  [address] [facility], [address]  [place] [address]  Phone: [phone]  Fax: [phone]  Follow Up Time: Routine    Perlman, Craig Douglas  Endocrinology Diabetes and Metabolism  [address] [facility], [address]  [place] [address]  Phone: [phone]  Fax: [phone]  Follow Up Time: 2 weeks    Henrique Chisholm  Nephrology  [address] [facility], [address]  [place] [address]  Phone: [phone]  Fax: [phone]  Follow Up Time: Routine

## 2025-06-12 NOTE — PROGRESS NOTE ADULT - PROBLEM SELECTOR PLAN 11
#VTE ppx: hep sq  #GI ppx: iv ppi  #Diet: Diet, Regular: DASH/TLC {Sodium & Cholesterol Restricted} (06-08-25 @ 15:56) [Active]  #Activity: Activity - Increase As Tolerated:   Time/Priority:  Routine (06-08-25 @ 14:16) [Active]  #ACP: full code  #Dispo: further plans pending clinical course #VTE ppx: - 6/12 heparin gtt  #GI ppx: iv ppi  #Diet: Diet, Regular: DASH/TLC {Sodium & Cholesterol Restricted} (06-08-25 @ 15:56) [Active]  #Activity: Activity - Increase As Tolerated:   Time/Priority:  Routine (06-08-25 @ 14:16) [Active]  #ACP: full code  #Dispo: further plans pending clinical course

## 2025-06-12 NOTE — PROGRESS NOTE ADULT - SUBJECTIVE AND OBJECTIVE BOX
Patient is a 84y old  Female who presents with a chief complaint of septic shock, PNA (12 Jun 2025 07:40)      INTERVAL HPI/OVERNIGHT EVENTS: No acute overnight events. Pt was seen and examined at bedside. Pt states that she is having rib pain, relieved by tylenol. Her L arm also hurts, due to multiple phlebotomy bruises.  Pt denies headache, dizziness, lightheadedness, fever, chills, body aches, CP, SOB, palpitations, abdominal pain, n/v, numbness/tingling.  No other complaints at this time.     MEDICATIONS  (STANDING):  albuterol/ipratropium for Nebulization 3 milliLiter(s) Nebulizer every 6 hours  aMIOdarone    Tablet 100 milliGRAM(s) Oral daily  chlorhexidine 2% Cloths 1 Application(s) Topical <User Schedule>  dextrose 5%. 1000 milliLiter(s) (100 mL/Hr) IV Continuous <Continuous>  dextrose 5%. 1000 milliLiter(s) (50 mL/Hr) IV Continuous <Continuous>  dextrose 50% Injectable 25 Gram(s) IV Push once  dextrose 50% Injectable 12.5 Gram(s) IV Push once  dextrose 50% Injectable 25 Gram(s) IV Push once  droxidopa 100 milliGRAM(s) Oral every 8 hours  fluticasone propionate/ salmeterol 500-50 MICROgram(s) Diskus 1 Dose(s) Inhalation two times a day  gabapentin 400 milliGRAM(s) Oral every 12 hours  glucagon  Injectable 1 milliGRAM(s) IntraMuscular once  heparin   Injectable 5000 Unit(s) SubCutaneous every 12 hours  hydrocortisone sodium succinate Injectable 50 milliGRAM(s) IV Push every 12 hours  insulin lispro (ADMELOG) corrective regimen sliding scale   SubCutaneous three times a day before meals  insulin lispro (ADMELOG) corrective regimen sliding scale   SubCutaneous at bedtime  midodrine 10 milliGRAM(s) Oral every 8 hours  montelukast 10 milliGRAM(s) Oral daily  pantoprazole  Injectable 40 milliGRAM(s) IV Push daily  piperacillin/tazobactam IVPB.. 3.375 Gram(s) IV Intermittent every 8 hours  tiotropium 2.5 MICROgram(s) Inhaler 2 Puff(s) Inhalation daily    MEDICATIONS  (PRN):  acetaminophen     Tablet .. 650 milliGRAM(s) Oral every 6 hours PRN Mild Pain (1 - 3)  dextrose Oral Gel 15 Gram(s) Oral once PRN Blood Glucose LESS THAN 70 milliGRAM(s)/deciliter      Allergies    No Known Allergies    Intolerances        REVIEW OF SYSTEMS:  CONSTITUTIONAL: No fever or chills  HEENT:  No headache, no sore throat  RESPIRATORY: No cough, wheezing, or shortness of breath  CARDIOVASCULAR: No chest pain, palpitations  GASTROINTESTINAL: No abd pain, nausea, vomiting, or diarrhea  GENITOURINARY: No dysuria, frequency, or hematuria  NEUROLOGICAL: no focal weakness or dizziness  MUSCULOSKELETAL: +L arm pain bruising +rib pain    Vital Signs Last 24 Hrs  T(C): 36.6 (12 Jun 2025 04:43), Max: 36.8 (11 Jun 2025 21:54)  T(F): 97.9 (12 Jun 2025 04:43), Max: 98.2 (11 Jun 2025 21:54)  HR: 84 (12 Jun 2025 06:30) (60 - 84)  BP: 142/80 (12 Jun 2025 06:30) (106/56 - 142/80)  BP(mean): --  RR: 18 (12 Jun 2025 04:43) (18 - 18)  SpO2: 99% (12 Jun 2025 04:43) (95% - 100%)    Parameters below as of 12 Jun 2025 04:43  Patient On (Oxygen Delivery Method): nasal cannula  O2 Flow (L/min): 2      PHYSICAL EXAM:  GENERAL: NAD elderly  HEENT:  anicteric, moist mucous membranes  CHEST/LUNG:  diminished b/l, especially R side  HEART:  RRR, S1, S2  ABDOMEN:  BS+, soft, nontender, nondistended  EXTREMITIES: no edema, cyanosis, or calf tenderness  NERVOUS SYSTEM: answers questions and follows commands appropriately      LABS:    CBC Full  -  ( 11 Jun 2025 06:45 )  WBC Count : 5.92 K/uL  Hemoglobin : 7.1 g/dL  Hematocrit : 22.6 %  Platelet Count - Automated : 263 K/uL  Mean Cell Volume : 97.0 fl  Mean Cell Hemoglobin : 30.5 pg  Mean Cell Hemoglobin Concentration : 31.4 g/dL  Auto Neutrophil # : x  Auto Lymphocyte # : x  Auto Monocyte # : x  Auto Eosinophil # : x  Auto Basophil # : x  Auto Neutrophil % : x  Auto Lymphocyte % : x  Auto Monocyte % : x  Auto Eosinophil % : x  Auto Basophil % : x      Ca    7.6        11 Jun 2025 06:45      PTT - ( 11 Jun 2025 03:30 )  PTT:26.8 sec  Urinalysis Basic - ( 11 Jun 2025 06:45 )    Color: x / Appearance: x / SG: x / pH: x  Gluc: 104 mg/dL / Ketone: x  / Bili: x / Urobili: x   Blood: x / Protein: x / Nitrite: x   Leuk Esterase: x / RBC: x / WBC x   Sq Epi: x / Non Sq Epi: x / Bacteria: x      CAPILLARY BLOOD GLUCOSE      POCT Blood Glucose.: 100 mg/dL (12 Jun 2025 08:21)  POCT Blood Glucose.: 146 mg/dL (11 Jun 2025 21:32)  POCT Blood Glucose.: 145 mg/dL (11 Jun 2025 17:14)  POCT Blood Glucose.: 126 mg/dL (11 Jun 2025 12:04)        Culture - Sputum (collected 06-08-25 @ 18:25)  Source: Sputum Sputum  Gram Stain (06-08-25 @ 22:12):    Few Squamous epithelial cells per low power field    Few polymorphonuclear leukocytes per low power field    Few Gram Positive Cocci in Pairs and Chains per oil power field  Final Report (06-10-25 @ 15:22):    Commensal marilee consistent with body site    Urinalysis with Rflx Culture (collected 06-08-25 @ 15:44)    Culture - Urine (collected 06-08-25 @ 15:44)  Source: Clean Catch  Final Report (06-09-25 @ 16:05):    <10,000 CFU/mL Normal Urogenital Marilee    Culture - Blood (collected 06-08-25 @ 11:20)  Source: Blood Blood-Peripheral  Preliminary Report (06-11-25 @ 14:02):    No growth at 72 Hours    Culture - Blood (collected 06-08-25 @ 11:15)  Source: Blood Blood-Peripheral  Preliminary Report (06-11-25 @ 14:02):    No growth at 72 Hours        RADIOLOGY & ADDITIONAL TESTS: ____    Personally reviewed.     Consultant(s) Notes Reviewed:  [x] YES  [ ] NO

## 2025-06-12 NOTE — DISCHARGE NOTE PROVIDER - NSDCFUSCHEDAPPT_GEN_ALL_CORE_FT
Rashid Stout  [facility]  CARDIOLOGY [address] [facility] [address]  Scheduled Appointment: 07/14/2025    Leia Gunn  [facility]  RHEUM [address] [facility] [address]  Scheduled Appointment: 08/29/2025     Rashid Stout  [facility]  CARDIOLOGY [address] [facility] [address]  Scheduled Appointment: 07/11/2025    Leia Gunn  [facility]  RHEUM [address] [facility] [address]  Scheduled Appointment: 08/29/2025

## 2025-06-12 NOTE — PROGRESS NOTE ADULT - ASSESSMENT
84 yr old female with PMHx afib on eliquis, COPD (not on home O2), PE/Rt lower extremity DVT 2017, Rt LE IVC filter 2017 CKD3, aplastic anemia, polymyalgia rheumatica on prednisone 5 mg po qd, requiring PRBC transfusions ~8 weeks (via Rt subclavian mediport), AVN bilat hips, HTN, HLD, HFpEF (75% 6.2.25), diastolic dysfx grade1, recent hospitalization 5/25/25-6/5/25 for AHDF/COPD exacerbation, pt also with hx of IVC filter, had Rt subclavian mediport, who was admitted on 6/8 with c/o Rt sided chest pain. general malaise. Pt stated began to feel ill evening before presentation, daughter this a.m. endorsed pt lethargic, pale. Patient was initially admitted in the ICU for hypotension requiring pressors. She is transferred out of the ICU. She still reports some R sided chest pain and SOB. Reports cough which started when she arrived in the hospital. Has some blood tinged sputum.    ID consulted for pneumonia. CT chest showed new multifocal infection predominantly involving the right upper lobe. Suspect respiratory symptoms multifactorial in the setting of CHF, anemia. No fevers and no leukocytosis. Blood cultures remain no growth. COVID/FLU/RSV negative. MRSA nasal PCR, urine strep and legionella antigen negative. Respiratory culture growing commensal marilee consistent with body site.    #R sided pneumonia  #Acute hypoxic respiratory failure    -continue Zosyn--complete 7 days  -Quantiferon, serum Fungitell and galactomannan pending  -follow cultures to completion    Kimberly Velazco MD  Division of Infectious Diseases   Cell [phone] between 8am and 6pm   After 6pm and weekends please call ID service at [phone].     35 minutes spent on total encounter assessing patient, examination, chart review, counseling and coordinating care by the attending physician/nurse/care manager.

## 2025-06-13 LAB
ANION GAP SERPL CALC-SCNC: 4 MMOL/L — LOW (ref 5–17)
ANION GAP SERPL CALC-SCNC: 5 MMOL/L — SIGNIFICANT CHANGE UP (ref 5–17)
APTT BLD: 68.8 SEC — HIGH (ref 26.1–36.8)
BUN SERPL-MCNC: 40 MG/DL — HIGH (ref 7–23)
BUN SERPL-MCNC: 42 MG/DL — HIGH (ref 7–23)
CALCIUM SERPL-MCNC: 8.6 MG/DL — SIGNIFICANT CHANGE UP (ref 8.5–10.1)
CALCIUM SERPL-MCNC: 9 MG/DL — SIGNIFICANT CHANGE UP (ref 8.5–10.1)
CHLORIDE SERPL-SCNC: 106 MMOL/L — SIGNIFICANT CHANGE UP (ref 96–108)
CHLORIDE SERPL-SCNC: 108 MMOL/L — SIGNIFICANT CHANGE UP (ref 96–108)
CO2 SERPL-SCNC: 23 MMOL/L — SIGNIFICANT CHANGE UP (ref 22–31)
CO2 SERPL-SCNC: 27 MMOL/L — SIGNIFICANT CHANGE UP (ref 22–31)
CREAT SERPL-MCNC: 1.1 MG/DL — SIGNIFICANT CHANGE UP (ref 0.5–1.3)
CREAT SERPL-MCNC: 1.2 MG/DL — SIGNIFICANT CHANGE UP (ref 0.5–1.3)
CULTURE RESULTS: SIGNIFICANT CHANGE UP
CULTURE RESULTS: SIGNIFICANT CHANGE UP
EGFR: 45 ML/MIN/1.73M2 — LOW
EGFR: 45 ML/MIN/1.73M2 — LOW
EGFR: 50 ML/MIN/1.73M2 — LOW
EGFR: 50 ML/MIN/1.73M2 — LOW
GLUCOSE SERPL-MCNC: 101 MG/DL — HIGH (ref 70–99)
GLUCOSE SERPL-MCNC: 62 MG/DL — LOW (ref 70–99)
MAGNESIUM SERPL-MCNC: 1.6 MG/DL — SIGNIFICANT CHANGE UP (ref 1.6–2.6)
PHOSPHATE SERPL-MCNC: 2.1 MG/DL — LOW (ref 2.5–4.5)
POTASSIUM SERPL-MCNC: 4.5 MMOL/L — SIGNIFICANT CHANGE UP (ref 3.5–5.3)
POTASSIUM SERPL-MCNC: 6 MMOL/L — HIGH (ref 3.5–5.3)
POTASSIUM SERPL-SCNC: 4.5 MMOL/L — SIGNIFICANT CHANGE UP (ref 3.5–5.3)
POTASSIUM SERPL-SCNC: 6 MMOL/L — HIGH (ref 3.5–5.3)
SODIUM SERPL-SCNC: 136 MMOL/L — SIGNIFICANT CHANGE UP (ref 135–145)
SODIUM SERPL-SCNC: 137 MMOL/L — SIGNIFICANT CHANGE UP (ref 135–145)
SPECIMEN SOURCE: SIGNIFICANT CHANGE UP
SPECIMEN SOURCE: SIGNIFICANT CHANGE UP

## 2025-06-13 PROCEDURE — 99233 SBSQ HOSP IP/OBS HIGH 50: CPT

## 2025-06-13 PROCEDURE — 99233 SBSQ HOSP IP/OBS HIGH 50: CPT | Mod: GC

## 2025-06-13 PROCEDURE — 99232 SBSQ HOSP IP/OBS MODERATE 35: CPT

## 2025-06-13 PROCEDURE — G0545: CPT

## 2025-06-13 RX ADMIN — Medication 50 MILLIGRAM(S): at 05:25

## 2025-06-13 RX ADMIN — TIOTROPIUM BROMIDE INHALATION SPRAY 2 PUFF(S): 3.12 SPRAY, METERED RESPIRATORY (INHALATION) at 09:39

## 2025-06-13 RX ADMIN — DROXIDOPA 100 MILLIGRAM(S): 300 CAPSULE ORAL at 14:23

## 2025-06-13 RX ADMIN — Medication 1 DOSE(S): at 18:38

## 2025-06-13 RX ADMIN — Medication 650 MILLIGRAM(S): at 23:00

## 2025-06-13 RX ADMIN — Medication 650 MILLIGRAM(S): at 01:33

## 2025-06-13 RX ADMIN — Medication 40 MILLIGRAM(S): at 12:18

## 2025-06-13 RX ADMIN — GABAPENTIN 400 MILLIGRAM(S): 400 CAPSULE ORAL at 05:25

## 2025-06-13 RX ADMIN — Medication 650 MILLIGRAM(S): at 00:33

## 2025-06-13 RX ADMIN — Medication 1 DOSE(S): at 09:39

## 2025-06-13 RX ADMIN — GABAPENTIN 400 MILLIGRAM(S): 400 CAPSULE ORAL at 17:03

## 2025-06-13 RX ADMIN — AMIODARONE HYDROCHLORIDE 100 MILLIGRAM(S): 50 INJECTION, SOLUTION INTRAVENOUS at 05:26

## 2025-06-13 RX ADMIN — MONTELUKAST SODIUM 10 MILLIGRAM(S): 10 TABLET ORAL at 12:18

## 2025-06-13 RX ADMIN — HEPARIN SODIUM 1200 UNIT(S)/HR: 1000 INJECTION INTRAVENOUS; SUBCUTANEOUS at 19:22

## 2025-06-13 RX ADMIN — IPRATROPIUM BROMIDE AND ALBUTEROL SULFATE 3 MILLILITER(S): .5; 2.5 SOLUTION RESPIRATORY (INHALATION) at 07:43

## 2025-06-13 RX ADMIN — IPRATROPIUM BROMIDE AND ALBUTEROL SULFATE 3 MILLILITER(S): .5; 2.5 SOLUTION RESPIRATORY (INHALATION) at 00:31

## 2025-06-13 RX ADMIN — DROXIDOPA 100 MILLIGRAM(S): 300 CAPSULE ORAL at 21:58

## 2025-06-13 RX ADMIN — Medication 1 APPLICATION(S): at 05:27

## 2025-06-13 RX ADMIN — DROXIDOPA 100 MILLIGRAM(S): 300 CAPSULE ORAL at 05:24

## 2025-06-13 RX ADMIN — IPRATROPIUM BROMIDE AND ALBUTEROL SULFATE 3 MILLILITER(S): .5; 2.5 SOLUTION RESPIRATORY (INHALATION) at 19:36

## 2025-06-13 RX ADMIN — IPRATROPIUM BROMIDE AND ALBUTEROL SULFATE 3 MILLILITER(S): .5; 2.5 SOLUTION RESPIRATORY (INHALATION) at 14:01

## 2025-06-13 RX ADMIN — HEPARIN SODIUM 1200 UNIT(S)/HR: 1000 INJECTION INTRAVENOUS; SUBCUTANEOUS at 07:59

## 2025-06-13 RX ADMIN — Medication 25 GRAM(S): at 08:01

## 2025-06-13 RX ADMIN — HEPARIN SODIUM 1200 UNIT(S)/HR: 1000 INJECTION INTRAVENOUS; SUBCUTANEOUS at 07:09

## 2025-06-13 RX ADMIN — Medication 25 GRAM(S): at 16:42

## 2025-06-13 RX ADMIN — HEPARIN SODIUM 1200 UNIT(S)/HR: 1000 INJECTION INTRAVENOUS; SUBCUTANEOUS at 09:31

## 2025-06-13 RX ADMIN — Medication 650 MILLIGRAM(S): at 22:03

## 2025-06-13 NOTE — PROGRESS NOTE ADULT - PROBLEM SELECTOR PLAN 11
#VTE ppx: - 6/12 heparin gtt  #GI ppx: iv ppi  #Diet: Diet, Regular: DASH/TLC {Sodium & Cholesterol Restricted} (06-08-25 @ 15:56) [Active]  #Activity: Activity - Increase As Tolerated:   Time/Priority:  Routine (06-08-25 @ 14:16) [Active]  #ACP: full code  #Dispo: further plans pending clinical course

## 2025-06-13 NOTE — PROGRESS NOTE ADULT - SUBJECTIVE AND OBJECTIVE BOX
[facility]  INFECTIOUS DISEASES - [address], [place] [address]  Tel: [phone]     Fax: [phone]  =======================================================    MARTINEZ JONES [id number]    Follow up: No fevers. Still reports hemoptysis and occasional SOB. Denies any pain.    Allergies:  No Known Allergies      Antibiotics:  acetaminophen     Tablet .. 650 milliGRAM(s) Oral every 6 hours PRN  albuterol/ipratropium for Nebulization 3 milliLiter(s) Nebulizer every 6 hours  aMIOdarone    Tablet 100 milliGRAM(s) Oral daily  chlorhexidine 2% Cloths 1 Application(s) Topical <User Schedule>  dextrose 5%. 1000 milliLiter(s) IV Continuous <Continuous>  dextrose 5%. 1000 milliLiter(s) IV Continuous <Continuous>  dextrose 50% Injectable 25 Gram(s) IV Push once  dextrose 50% Injectable 12.5 Gram(s) IV Push once  dextrose 50% Injectable 25 Gram(s) IV Push once  dextrose Oral Gel 15 Gram(s) Oral once PRN  droxidopa 100 milliGRAM(s) Oral every 8 hours  fluticasone propionate/ salmeterol 500-50 MICROgram(s) Diskus 1 Dose(s) Inhalation two times a day  gabapentin 400 milliGRAM(s) Oral every 12 hours  glucagon  Injectable 1 milliGRAM(s) IntraMuscular once  heparin   Injectable 5500 Unit(s) IV Push every 6 hours PRN  heparin   Injectable 2500 Unit(s) IV Push every 6 hours PRN  heparin  Infusion.  Unit(s)/Hr IV Continuous <Continuous>  hydrocortisone sodium succinate Injectable 50 milliGRAM(s) IV Push daily  insulin lispro (ADMELOG) corrective regimen sliding scale   SubCutaneous three times a day before meals  insulin lispro (ADMELOG) corrective regimen sliding scale   SubCutaneous at bedtime  midodrine 10 milliGRAM(s) Oral every 8 hours  montelukast 10 milliGRAM(s) Oral daily  pantoprazole  Injectable 40 milliGRAM(s) IV Push daily  piperacillin/tazobactam IVPB.. 3.375 Gram(s) IV Intermittent every 8 hours  tiotropium 2.5 MICROgram(s) Inhaler 2 Puff(s) Inhalation daily       REVIEW OF SYSTEMS:  CONSTITUTIONAL:  No Fever or chills  HEENT:  No sore throat or runny nose.  CARDIOVASCULAR: + R sided chest pain  RESPIRATORY:  + cough, shortness of breath  GASTROINTESTINAL:  No nausea, vomiting or diarrhea.  GENITOURINARY:  No dysuria  MUSCULOSKELETAL:  no back pain  NEUROLOGIC:  No headache or dizziness  PSYCHIATRIC:  No disorder of thought or mood.     Physical Exam:  ICU Vital Signs Last 24 Hrs  T(C): 37.1 (13 Jun 2025 11:33), Max: 37.1 (13 Jun 2025 11:33)  T(F): 98.7 (13 Jun 2025 11:33), Max: 98.7 (13 Jun 2025 11:33)  HR: 82 (13 Jun 2025 11:33) (65 - 82)  BP: 123/57 (13 Jun 2025 11:33) (109/63 - 129/67)  BP(mean): --  ABP: --  ABP(mean): --  RR: 18 (13 Jun 2025 11:33) (18 - 19)  SpO2: 93% (13 Jun 2025 11:33) (87% - 97%)    O2 Parameters below as of 13 Jun 2025 11:33  Patient On (Oxygen Delivery Method): nasal cannula    GEN: NAD, sitting up in chair  HEENT: normocephalic and atraumatic.   NECK: Supple.   LUNGS: Normal respiratory effort  HEART: Regular rate and rhythm   ABDOMEN: Soft, nontender, and nondistended.    EXTREMITIES: B/L Lower leg edema.  NEUROLOGIC: Answering questions appropriately  PSYCHIATRIC: Appropriate affect .      Labs:  06-13    136  |  108  |  42[H]  ----------------------------<  62[L]  6.0[H]   |  23  |  1.20    Ca    8.6      13 Jun 2025 07:30  Phos  2.1     06-13  Mg     1.6     06-13    TPro  5.8[L]  /  Alb  3.1[L]  /  TBili  0.7  /  DBili  x   /  AST  <3[L]  /  ALT  23  /  AlkPhos  37[L]  06-12                          8.4    15.96 )-----------( 273      ( 13 Jun 2025 11:40 )             25.7     PTT - ( 13 Jun 2025 07:30 )  PTT:68.8 sec  Urinalysis Basic - ( 13 Jun 2025 07:30 )    Color: x / Appearance: x / SG: x / pH: x  Gluc: 62 mg/dL / Ketone: x  / Bili: x / Urobili: x   Blood: x / Protein: x / Nitrite: x   Leuk Esterase: x / RBC: x / WBC x   Sq Epi: x / Non Sq Epi: x / Bacteria: x      LIVER FUNCTIONS - ( 12 Jun 2025 09:20 )  Alb: 3.1 g/dL / Pro: 5.8 g/dL / ALK PHOS: 37 U/L / ALT: 23 U/L / AST: <3 U/L / GGT: x             RECENT CULTURES:  06-08 @ 18:25 Sputum Sputum     Commensal marilee consistent with body site    Few Squamous epithelial cells per low power field  Few polymorphonuclear leukocytes per low power field  Few Gram Positive Cocci in Pairs and Chains per oil power field      06-08 @ 15:44 Clean Catch     <10,000 CFU/mL Normal Urogenital Marilee        06-08 @ 11:20 Blood Blood-Peripheral     No growth at 4 days        06-08 @ 11:15 Blood Blood-Peripheral     No growth at 4 days              All imaging and data are reviewed.

## 2025-06-13 NOTE — PROGRESS NOTE ADULT - PROBLEM SELECTOR PLAN 1
In the ED, tmax of 101 w/ KIMBERLY on CKD with Cr 1.90 (baseline 1.2-1.6), Hypotensive with SBP 80's (pt on midodrine therapy, usual 's). also w/ leukocytosis of 32.33. Procalcitonin of 13.6, RVP neg. Pt treated in ICU for septic shock requiring pressors, downgraded from the ICU to telemetry on 6/10  - CXR: Dense infiltrate off the right upper hilum is presently seen  - CT chest 6/10 with multifocal R sided PNA  - continue zosyn, d/c azithro  - c/w solucortef 50 q12  - F/U quant, fungitell  - MRSA/MSSA PCR negative  - trend WBC and fever curve, supplemental O2 prn to maintain SPO2 > 92% -- taper o2  - ID consulted Dr Juan A varma appreciated

## 2025-06-13 NOTE — SOCIAL WORK PROGRESS NOTE - NSSWPROGRESSNOTE_GEN_ALL_CORE
Per MD, weekend dc possible to [facility]. Pt's dtr Lenore aware that weekend dc is possible and in agreement. SW will continue to follow.

## 2025-06-13 NOTE — PROGRESS NOTE ADULT - SUBJECTIVE AND OBJECTIVE BOX
Interval History:  continues with SOB  Chart reviewed and events noted;   Overnight events:    MEDICATIONS  (STANDING):  albuterol/ipratropium for Nebulization 3 milliLiter(s) Nebulizer every 6 hours  aMIOdarone    Tablet 100 milliGRAM(s) Oral daily  chlorhexidine 2% Cloths 1 Application(s) Topical <User Schedule>  dextrose 5%. 1000 milliLiter(s) (50 mL/Hr) IV Continuous <Continuous>  dextrose 5%. 1000 milliLiter(s) (100 mL/Hr) IV Continuous <Continuous>  dextrose 50% Injectable 12.5 Gram(s) IV Push once  dextrose 50% Injectable 25 Gram(s) IV Push once  dextrose 50% Injectable 25 Gram(s) IV Push once  droxidopa 100 milliGRAM(s) Oral every 8 hours  fluticasone propionate/ salmeterol 500-50 MICROgram(s) Diskus 1 Dose(s) Inhalation two times a day  gabapentin 400 milliGRAM(s) Oral every 12 hours  glucagon  Injectable 1 milliGRAM(s) IntraMuscular once  heparin  Infusion.  Unit(s)/Hr (12 mL/Hr) IV Continuous <Continuous>  hydrocortisone sodium succinate Injectable 50 milliGRAM(s) IV Push daily  insulin lispro (ADMELOG) corrective regimen sliding scale   SubCutaneous three times a day before meals  insulin lispro (ADMELOG) corrective regimen sliding scale   SubCutaneous at bedtime  midodrine 10 milliGRAM(s) Oral every 8 hours  montelukast 10 milliGRAM(s) Oral daily  pantoprazole  Injectable 40 milliGRAM(s) IV Push daily  piperacillin/tazobactam IVPB.. 3.375 Gram(s) IV Intermittent every 8 hours  tiotropium 2.5 MICROgram(s) Inhaler 2 Puff(s) Inhalation daily    MEDICATIONS  (PRN):  acetaminophen     Tablet .. 650 milliGRAM(s) Oral every 6 hours PRN Mild Pain (1 - 3)  dextrose Oral Gel 15 Gram(s) Oral once PRN Blood Glucose LESS THAN 70 milliGRAM(s)/deciliter  heparin   Injectable 5500 Unit(s) IV Push every 6 hours PRN For aPTT less than 40  heparin   Injectable 2500 Unit(s) IV Push every 6 hours PRN For aPTT between 40 - 57      Vital Signs Last 24 Hrs  T(C): 37.1 (13 Jun 2025 21:43), Max: 37.1 (13 Jun 2025 11:33)  T(F): 98.7 (13 Jun 2025 21:43), Max: 98.7 (13 Jun 2025 11:33)  HR: 85 (13 Jun 2025 21:43) (70 - 86)  BP: 118/63 (13 Jun 2025 21:43) (115/61 - 129/67)  BP(mean): --  RR: 18 (13 Jun 2025 21:43) (18 - 18)  SpO2: 95% (13 Jun 2025 21:43) (93% - 97%)    Parameters below as of 13 Jun 2025 21:43  Patient On (Oxygen Delivery Method): nasal cannula  O2 Flow (L/min): 2      PHYSICAL EXAM  General: adult in NAD, chronically ill appearing  HEENT: clear oropharynx, anicteric sclera, pink conjunctivae  Neck: supple  CV: normal S1S2 with no murmur rubs or gallops  Lungs: clear to auscultation, no wheezes, no rhales  Abdomen: soft non-tender non-distended, no hepato/splenomegaly  Ext: no clubbing cyanosis or edema  Skin: no rashes and no petichiae  Neuro: alert and oriented X3 no focal deficits      LABS:  CBC Full  -  ( 13 Jun 2025 11:40 )  WBC Count : 15.96 K/uL  RBC Count : 2.73 M/uL  Hemoglobin : 8.4 g/dL  Hematocrit : 25.7 %  Platelet Count - Automated : 273 K/uL  Mean Cell Volume : 94.1 fl  Mean Cell Hemoglobin : 30.8 pg  Mean Cell Hemoglobin Concentration : 32.7 g/dL  Auto Neutrophil # : x  Auto Lymphocyte # : x  Auto Monocyte # : x  Auto Eosinophil # : x  Auto Basophil # : x  Auto Neutrophil % : x  Auto Lymphocyte % : x  Auto Monocyte % : x  Auto Eosinophil % : x  Auto Basophil % : x    06-13    137  |  106  |  40[H]  ----------------------------<  101[H]  4.5   |  27  |  1.10    Ca    9.0      13 Jun 2025 16:10  Phos  2.1     06-13  Mg     1.6     06-13    TPro  5.8[L]  /  Alb  3.1[L]  /  TBili  0.7  /  DBili  x   /  AST  <3[L]  /  ALT  23  /  AlkPhos  37[L]  06-12    PTT - ( 13 Jun 2025 07:30 )  PTT:68.8 sec    fe studies      WBC trend  15.96 K/uL (06-13-25 @ 11:40)  12.65 K/uL (06-12-25 @ 17:14)  8.82 K/uL (06-12-25 @ 09:20)  5.92 K/uL (06-11-25 @ 06:45)  7.40 K/uL (06-11-25 @ 03:30)      Hgb trend  8.4 g/dL (06-13-25 @ 11:40)  8.8 g/dL (06-12-25 @ 17:14)  8.8 g/dL (06-12-25 @ 09:20)  7.1 g/dL (06-11-25 @ 06:45)  7.6 g/dL (06-11-25 @ 03:30)      plt trend  273 K/uL (06-13-25 @ 11:40)  287 K/uL (06-12-25 @ 17:14)  271 K/uL (06-12-25 @ 09:20)  263 K/uL (06-11-25 @ 06:45)  213 K/uL (06-11-25 @ 03:30)        RADIOLOGY & ADDITIONAL STUDIES:

## 2025-06-13 NOTE — PROGRESS NOTE ADULT - ASSESSMENT
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   transfered to medical unit 06/10/25  hgb 8.4 today      RECOMMENDATIONS  follow CBC and transfuse as indicated  continues on heparin. watch closely for bleeding  continue present cardiac management

## 2025-06-13 NOTE — PROGRESS NOTE ADULT - SUBJECTIVE AND OBJECTIVE BOX
[facility]                                                       Dr. Chisholm, Dr. Franklin, Dr. Baron, Dr. Clark, Dr. Stock                                      [facility], [facility], [address]                                                 [address] [place] [address]                                      Ph: [phone]  Fax: [phone]                                         Ph: [phone]  Fax: [phone]      Patient is a 84y old  Female who presents with a chief complaint of septic shock, PNA (09 Jun 2025 12:36)  Patient seen in follow up for KIMBERLY on CKD>        PAST MEDICAL HISTORY:  COPD (chronic obstructive pulmonary disease)    DM (diabetes mellitus)    Pulmonary fibrosis    PAF (paroxysmal atrial fibrillation)    Hiatal hernia    GIB (gastrointestinal bleeding)    Pericarditis    Shoulder fracture, right    Hematoma    H/O aplastic anemia    History of IBS    H/O osteoporosis    HLD (hyperlipidemia)    PMR (polymyalgia rheumatica)    History of basal cell cancer    Chronic kidney disease (CKD)    Hypotension    Presence of IVC filter    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    History of immunodeficiency    Lumbar compression fracture    H/O hiatal hernia    H/O pulmonary fibrosis    H/O sinus bradycardia    History of fracture of right hip      MEDICATIONS  (STANDING):  albuterol/ipratropium for Nebulization 3 milliLiter(s) Nebulizer every 6 hours  aMIOdarone    Tablet 100 milliGRAM(s) Oral daily  chlorhexidine 2% Cloths 1 Application(s) Topical <User Schedule>  dextrose 5%. 1000 milliLiter(s) (100 mL/Hr) IV Continuous <Continuous>  dextrose 5%. 1000 milliLiter(s) (50 mL/Hr) IV Continuous <Continuous>  dextrose 50% Injectable 25 Gram(s) IV Push once  dextrose 50% Injectable 12.5 Gram(s) IV Push once  dextrose 50% Injectable 25 Gram(s) IV Push once  droxidopa 100 milliGRAM(s) Oral every 8 hours  fluticasone propionate/ salmeterol 500-50 MICROgram(s) Diskus 1 Dose(s) Inhalation two times a day  gabapentin 400 milliGRAM(s) Oral every 12 hours  glucagon  Injectable 1 milliGRAM(s) IntraMuscular once  heparin  Infusion.  Unit(s)/Hr (12 mL/Hr) IV Continuous <Continuous>  hydrocortisone sodium succinate Injectable 50 milliGRAM(s) IV Push daily  insulin lispro (ADMELOG) corrective regimen sliding scale   SubCutaneous three times a day before meals  insulin lispro (ADMELOG) corrective regimen sliding scale   SubCutaneous at bedtime  midodrine 10 milliGRAM(s) Oral every 8 hours  montelukast 10 milliGRAM(s) Oral daily  pantoprazole  Injectable 40 milliGRAM(s) IV Push daily  piperacillin/tazobactam IVPB.. 3.375 Gram(s) IV Intermittent every 8 hours  tiotropium 2.5 MICROgram(s) Inhaler 2 Puff(s) Inhalation daily    MEDICATIONS  (PRN):  acetaminophen     Tablet .. 650 milliGRAM(s) Oral every 6 hours PRN Mild Pain (1 - 3)  dextrose Oral Gel 15 Gram(s) Oral once PRN Blood Glucose LESS THAN 70 milliGRAM(s)/deciliter  heparin   Injectable 5500 Unit(s) IV Push every 6 hours PRN For aPTT less than 40  heparin   Injectable 2500 Unit(s) IV Push every 6 hours PRN For aPTT between 40 - 57    T(C): 37.1 (06-13-25 @ 11:33), Max: 37.1 (06-13-25 @ 11:33)  HR: 82 (06-13-25 @ 14:19) (60 - 86)  BP: 115/61 (06-13-25 @ 14:12) (106/56 - 142/80)  RR: 18 (06-13-25 @ 11:33)  SpO2: 94% (06-13-25 @ 14:19)  Wt(kg): --  I&O's Detail                    PHYSICAL EXAM:  General: No distress  Respiratory: b/l air entry  Cardiovascular: S1 S2  Gastrointestinal: soft  Extremities:  edema          LABORATORY:                        8.4    15.96 )-----------( 273      ( 13 Jun 2025 11:40 )             25.7     06-13    136  |  108  |  42[H]  ----------------------------<  62[L]  6.0[H]   |  23  |  1.20    Ca    8.6      13 Jun 2025 07:30  Phos  2.1     06-13  Mg     1.6     06-13    TPro  5.8[L]  /  Alb  3.1[L]  /  TBili  0.7  /  DBili  x   /  AST  <3[L]  /  ALT  23  /  AlkPhos  37[L]  06-12    Sodium: 136 mmol/L (06-13 @ 07:30)  Sodium: 139 mmol/L (06-12 @ 09:20)    Potassium: 6.0 mmol/L (06-13 @ 07:30)  Potassium: 4.4 mmol/L (06-12 @ 09:20)    Hemoglobin: 8.4 g/dL (06-13 @ 11:40)  Hemoglobin: 8.8 g/dL (06-12 @ 17:14)  Hemoglobin: 8.8 g/dL (06-12 @ 09:20)  Hemoglobin: 7.1 g/dL (06-11 @ 06:45)    Creatinine, Serum 1.20 (06-13 @ 07:30)  Creatinine, Serum 1.30 (06-12 @ 09:20)  Creatinine, Serum 1.30 (06-11 @ 06:45)        LIVER FUNCTIONS - ( 12 Jun 2025 09:20 )  Alb: 3.1 g/dL / Pro: 5.8 g/dL / ALK PHOS: 37 U/L / ALT: 23 U/L / AST: <3 U/L / GGT: x           Urinalysis Basic - ( 13 Jun 2025 07:30 )    Color: x / Appearance: x / SG: x / pH: x  Gluc: 62 mg/dL / Ketone: x  / Bili: x / Urobili: x   Blood: x / Protein: x / Nitrite: x   Leuk Esterase: x / RBC: x / WBC x   Sq Epi: x / Non Sq Epi: x / Bacteria: x

## 2025-06-13 NOTE — PROGRESS NOTE ADULT - SUBJECTIVE AND OBJECTIVE BOX
[facility] -- Sai Valle, Girish Jackson Savella, Goodger, Cohen: Office # [phone]    Follow Up:  Hypotension    Subjective/Observations: No events overnight resting comfortably in bed.  No complaints of chest pain, dyspnea, or palpitations reported. No signs of orthopnea or PND. Wearing nasal cannula 2 L    REVIEW OF SYSTEMS: All other review of systems are negative unless indicated above    PAST MEDICAL & SURGICAL HISTORY:  COPD (chronic obstructive pulmonary disease)      DM (diabetes mellitus)  diet-controlled      PAF (paroxysmal atrial fibrillation)  s/p ablation      Hiatal hernia      H/O aplastic anemia      History of IBS      H/O osteoporosis      HLD (hyperlipidemia)      PMR (polymyalgia rheumatica)      History of basal cell cancer      Chronic kidney disease (CKD)      Hypotension      Presence of IVC filter      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      History of immunodeficiency      Lumbar compression fracture      H/O hiatal hernia      H/O pulmonary fibrosis      H/O sinus bradycardia      History of fracture of right hip  "unoperable"      S/P hysterectomy      S/P foot surgery      S/P knee surgery      After cataract  bilateral eye cataract surgically removed      Closed right hip fracture  rigth hip ORIF july 19 2016      S/P IVC filter  nov 2016      S/P carpal tunnel release  Right 2008 & 6/27/17      H/O kyphoplasty      Port-A-Cath in place  right chest          MEDICATIONS  (STANDING):  albuterol/ipratropium for Nebulization 3 milliLiter(s) Nebulizer every 6 hours  aMIOdarone    Tablet 100 milliGRAM(s) Oral daily  chlorhexidine 2% Cloths 1 Application(s) Topical <User Schedule>  dextrose 5%. 1000 milliLiter(s) (50 mL/Hr) IV Continuous <Continuous>  dextrose 5%. 1000 milliLiter(s) (100 mL/Hr) IV Continuous <Continuous>  dextrose 50% Injectable 25 Gram(s) IV Push once  dextrose 50% Injectable 12.5 Gram(s) IV Push once  dextrose 50% Injectable 25 Gram(s) IV Push once  droxidopa 100 milliGRAM(s) Oral every 8 hours  fluticasone propionate/ salmeterol 500-50 MICROgram(s) Diskus 1 Dose(s) Inhalation two times a day  gabapentin 400 milliGRAM(s) Oral every 12 hours  glucagon  Injectable 1 milliGRAM(s) IntraMuscular once  heparin  Infusion.  Unit(s)/Hr (12 mL/Hr) IV Continuous <Continuous>  hydrocortisone sodium succinate Injectable 50 milliGRAM(s) IV Push daily  insulin lispro (ADMELOG) corrective regimen sliding scale   SubCutaneous three times a day before meals  insulin lispro (ADMELOG) corrective regimen sliding scale   SubCutaneous at bedtime  midodrine 10 milliGRAM(s) Oral every 8 hours  montelukast 10 milliGRAM(s) Oral daily  pantoprazole  Injectable 40 milliGRAM(s) IV Push daily  piperacillin/tazobactam IVPB.. 3.375 Gram(s) IV Intermittent every 8 hours  tiotropium 2.5 MICROgram(s) Inhaler 2 Puff(s) Inhalation daily    MEDICATIONS  (PRN):  acetaminophen     Tablet .. 650 milliGRAM(s) Oral every 6 hours PRN Mild Pain (1 - 3)  dextrose Oral Gel 15 Gram(s) Oral once PRN Blood Glucose LESS THAN 70 milliGRAM(s)/deciliter  heparin   Injectable 2500 Unit(s) IV Push every 6 hours PRN For aPTT between 40 - 57  heparin   Injectable 5500 Unit(s) IV Push every 6 hours PRN For aPTT less than 40    Allergies    No Known Allergies    Intolerances      Vital Signs Last 24 Hrs  T(C): 36.8 (13 Jun 2025 05:22), Max: 36.8 (12 Jun 2025 21:43)  T(F): 98.2 (13 Jun 2025 05:22), Max: 98.2 (12 Jun 2025 21:43)  HR: 70 (13 Jun 2025 05:22) (65 - 82)  BP: 129/67 (13 Jun 2025 05:22) (109/63 - 129/67)  BP(mean): --  RR: 18 (13 Jun 2025 05:22) (18 - 19)  SpO2: 95% (13 Jun 2025 05:22) (87% - 97%)    Parameters below as of 13 Jun 2025 05:22  Patient On (Oxygen Delivery Method): nasal cannula      I&O's Summary        TELE: Off cardiac monitor   PHYSICAL EXAM:  Constitutional: NAD, awake and alert  HEENT: Moist Mucous Membranes, Anicteric  Pulmonary: Non-labored, breath sounds are clear bilaterally, No wheezing, rales or rhonchi  Cardiovascular: Regular, S1 and S2, No murmurs, No rubs, gallops or clicks  Gastrointestinal:  soft, nontender, nondistended   Lymph: No peripheral edema. No lymphadenopathy.   Skin: No visible rashes or ulcers.  Psych:  Mood & affect appropriate      LABS: All Labs Reviewed:                        8.8    12.65 )-----------( 287      ( 12 Jun 2025 17:14 )             27.2                         8.8    8.82  )-----------( 271      ( 12 Jun 2025 09:20 )             27.7                         7.1    5.92  )-----------( 263      ( 11 Jun 2025 06:45 )             22.6     12 Jun 2025 09:20    139    |  104    |  49     ----------------------------<  177    4.4     |  28     |  1.30   11 Jun 2025 06:45    141    |  107    |  52     ----------------------------<  104    5.1     |  28     |  1.30     Ca    8.6        12 Jun 2025 09:20  Ca    7.6        11 Jun 2025 06:45  Phos  2.0       12 Jun 2025 09:20  Mg     1.7       12 Jun 2025 09:20    TPro  5.8    /  Alb  3.1    /  TBili  0.7    /  DBili  x      /  AST  <3     /  ALT  23     /  AlkPhos  37     12 Jun 2025 09:20  TPro  5.7    /  Alb  3.0    /  TBili  0.6    /  DBili  x      /  AST  6      /  ALT  27     /  AlkPhos  33     11 Jun 2025 06:45   LIVER FUNCTIONS - ( 12 Jun 2025 09:20 )  Alb: 3.1 g/dL / Pro: 5.8 g/dL / ALK PHOS: 37 U/L / ALT: 23 U/L / AST: <3 U/L / GGT: x           PTT - ( 13 Jun 2025 07:30 )  PTT:68.8 secTroponin I, High Sensitivity Result: 24.3 ng/L (06-09-25 @ 05:48)  Troponin I, High Sensitivity Result: 24.7 ng/L (06-08-25 @ 19:35)  Troponin I, High Sensitivity Result: 32.8 ng/L (06-08-25 @ 11:20)    12 Lead ECG:   Ventricular Rate 80 BPM    Atrial Rate 80 BPM    P-R Interval 132 ms    QRS Duration 86 ms    Q-T Interval 398 ms    QTC Calculation(Bazett) 459 ms    P Axis 75 degrees    R Axis -35 degrees    T Axis 78 degrees    Diagnosis Line Sinus rhythm with premature supraventricular complexes  Left axis deviation  Possible Lateral infarct , age undetermined  Abnormal ECG  When compared with ECG of 03-JUN-2025 22:27,  premature ventricular complexes are no longer present  Confirmed by ROSALVA GOODRICH ([id number]) on 6/8/2025 9:50:39 PM (06-08-25 @ 10:58)      TRANSTHORACIC ECHOCARDIOGRAM REPORT  ________________________________________________________________________________                                      _______       Pt. Name:       MARTINEZ JONES Study Date:    6/2/2025  MRN:            [id number]        YOB: 1940  Accession #:    [id number]       Age:           84 years  Account#:       [id number]      Gender:        F  Heart Rate:                     Height:        62.99 in (160.00 cm)  Rhythm:                         Weight:        154.32 lb (70.00 kg)  Blood Pressure: 91/55 mmHg      BSA/BMI:       1.73 m² / 27.34 kg/m²  ________________________________________________________________________________________  Referring Physician:    [id number] James Osei  Interpreting Physician: Daniel Jorge M.D.  Primary Sonographer:    Kelsey Monson [facility]    CPT:               ECHO TTE WO CON COMP W DOPP - 93572.m  Indication(s):     Cardiomyopathy, unspecified - I42.9  Procedure:         Transthoracic echocardiogram with 2-D, M-mode and complete                     spectral and color flow Doppler.  Ordering Location: [facility]  Admission Status:  Inpatient    _______________________________________________________________________________________     CONCLUSIONS:      1. Leftventricular systolic function is normal with an ejection fraction of 71 % by Arias's method of disks.   2. Normal right ventricular cavity size, with normal wall thickness, and normal right ventricular systolic function.   3. Left atrium is moderately dilated.   4. Mild mitral regurgitation.   5. Estimated pulmonary artery systolic pressure is 54 mmHg.   6. Mild pulmonic regurgitation.   7. Mild tricuspid regurgitation.   8. Small bilateral pleural effusion noted.   9. There is increased LV mass and concentric hypertrophy.    ________________________________________________________________________________________  FINDINGS:     Left Ventricle:  Left ventricular systolic function is normal with a calculated ejection fraction of 71 % by the Arias's biplane method of disks. Moderate left ventricular hypertrophy. There is increased LV mass and concentric hypertrophy.     Right Ventricle:  The right ventricular cavity is normal in size, with normal wall thickness and right ventricular systolic function is normal.     Left Atrium:  The left atrium is moderately dilated with an indexed volume of 44.98 ml/m².     Right Atrium:  The right atrium is normal in size with an indexed volume of 21.88 ml/m² and an indexed area of 8.72 cm²/m².     Aortic Valve:  The aortic valve is tricuspid with normal leaflet excursion. There is mild thickening of the aortic valve leaflets.     Mitral Valve:  Structurally normal mitral valve with normal leaflet excursion. There is calcification of the mitralvalve annulus. There is mild leaflet calcification. There is mild mitral regurgitation.     Tricuspid Valve:  The tricuspid valve is structurally normal with normal leaflet excursion. There is mild tricuspid regurgitation. Estimated pulmonary artery systolic pressure is 54 mmHg.     Pulmonic Valve:  Structurally normal pulmonic valve with normal leaflet excursion. There is mild pulmonic regurgitation.     Aorta:  The aortic root at the sinuses of Valsalva is normal in size, measuring 3.00 cm (indexed 1.73 cm/m²). The aortic arch diameter is normal in size, measuring 2.4 cm (indexed 1.39 cm/m²).     Pericardium:  There is a trace pericardial effusion.     Pleura:  Small bilateral pleural effusion noted.     Systemic Veins:  The inferior vena cava is normal in size measuring 1.77 cm in diameter, (normal <2.1cm) with abnormal inspiratory collapse (abnormal <50%) consistent with mildly elevated right atrial pressure (~8, range 5-10mmHg).  ____________________________________________________________________  QUANTITATIVE DATA:  Left Ventricle Measurements: (Indexed to BSA)     IVSd (2D):   1.3 cm  LVPWd (2D):  1.2 cm  LVIDd (2D):  4.5 cm  LVIDs (2D):  2.7 cm  LV Mass:     199 g  114.9 g/m²  LV Vol d, MOD A2C: 66.0 ml 38.11 ml/m²  LV Vol d,MOD A4C: 61.7 ml 35.63 ml/m²  LV Vol d, MOD BP:  63.9 ml 36.89 ml/m²  LV Vol s, MOD A2C: 19.4 ml 11.20 ml/m²  LV Vol s, MOD A4C: 16.4 ml 9.47 ml/m²  LV Vol s, MOD BP:  18.2 ml 10.52 ml/m²  LVOT SV MOD BP:    45.7 ml  LV EF% MOD BP:     71 %     MV E Vmax:    1.23 m/s  MV A Vmax:    0.68 m/s  MV E/A:       1.82  e' lateral:   7.51 cm/s  e' medial:    3.05 cm/s  E/e' lateral: 16.38  E/e' medial:  40.33  E/e' Average: 23.30  MV DT:        207 msec    Aorta Measurements: (Normal range) (Indexed to BSA)     Ao Root d 3.00 cm (2.7 - 3.3 cm) 1.73 cm/m²  Ao Arch:  2.4 cm            Left Atrium Measurements: (Indexed to BSA)  LA Diam 2D: 4.10 cm         Right Atrial Measurements:     RA Vol s, MOD A4C         37.9 ml  RA Vol s, MOD A4C i BSA   21.88 ml/m²  RA Area s, MOD A4C        15.1 cm²  RA Area s, MOD A4C, i BSA 8.72 cm²/m²    Mitral Valve Measurements:     MV E Vmax: 1.2 m/s         MR Vmax:          4.19 m/s  MV A Vmax: 0.7 m/s         MR Peak Gradient: 70.2 mmHg  MV E/A:    1.8       Tricuspid Valve Measurements:     TR Vmax:          3.4 m/s  TR Peak Gradient: 45.7 mmHg  RA Pressure:      8 mmHg  PASP:             54 mmHg    ________________________________________________________________________________________  Electronically signedon 6/2/2025 at 1:09:26 PM by Daniel Jorge M.D.         *** Final ***

## 2025-06-13 NOTE — PROGRESS NOTE ADULT - ASSESSMENT
Prerenal azotemia, CKD 3  Dyspnea: Pneumonia, h/o COPD/CHF  s/p Shock   Diabetes  Aplastic anemia, requiring frequent blood transfusions    Improved and stable renal indices. High K > Hemolyzed sample. Will get stat repeat. To continue current meds. IV abx, ID follow up. Avoid excessive PO fluids.   Monitor blood sugar levels. Insulin coverage as needed. Trend BP and titrate BP meds as needed. Monitor h/h trend. Transfuse PRBC's PRN. Avoid nephrotoxic meds as possible.   Will follow electrolytes and renal function trend.

## 2025-06-13 NOTE — PROGRESS NOTE ADULT - SUBJECTIVE AND OBJECTIVE BOX
Patient is a 84y old  Female who presents with a chief complaint of septic shock, PNA (13 Jun 2025 08:12)      INTERVAL HPI/OVERNIGHT EVENTS: No acute overnight events. Pt was seen and examined at bedside. Pt states that she feels about the same today, some shortness of breath.  Pt denies headache, dizziness, lightheadedness, fever, chills, body aches, CP, SOB, palpitations, abdominal pain, n/v, numbness/tingling.  No other complaints at this time.     MEDICATIONS  (STANDING):  albuterol/ipratropium for Nebulization 3 milliLiter(s) Nebulizer every 6 hours  aMIOdarone    Tablet 100 milliGRAM(s) Oral daily  chlorhexidine 2% Cloths 1 Application(s) Topical <User Schedule>  dextrose 5%. 1000 milliLiter(s) (50 mL/Hr) IV Continuous <Continuous>  dextrose 5%. 1000 milliLiter(s) (100 mL/Hr) IV Continuous <Continuous>  dextrose 50% Injectable 25 Gram(s) IV Push once  dextrose 50% Injectable 12.5 Gram(s) IV Push once  dextrose 50% Injectable 25 Gram(s) IV Push once  droxidopa 100 milliGRAM(s) Oral every 8 hours  fluticasone propionate/ salmeterol 500-50 MICROgram(s) Diskus 1 Dose(s) Inhalation two times a day  gabapentin 400 milliGRAM(s) Oral every 12 hours  glucagon  Injectable 1 milliGRAM(s) IntraMuscular once  heparin  Infusion.  Unit(s)/Hr (12 mL/Hr) IV Continuous <Continuous>  hydrocortisone sodium succinate Injectable 50 milliGRAM(s) IV Push daily  insulin lispro (ADMELOG) corrective regimen sliding scale   SubCutaneous three times a day before meals  insulin lispro (ADMELOG) corrective regimen sliding scale   SubCutaneous at bedtime  midodrine 10 milliGRAM(s) Oral every 8 hours  montelukast 10 milliGRAM(s) Oral daily  pantoprazole  Injectable 40 milliGRAM(s) IV Push daily  piperacillin/tazobactam IVPB.. 3.375 Gram(s) IV Intermittent every 8 hours  tiotropium 2.5 MICROgram(s) Inhaler 2 Puff(s) Inhalation daily    MEDICATIONS  (PRN):  acetaminophen     Tablet .. 650 milliGRAM(s) Oral every 6 hours PRN Mild Pain (1 - 3)  dextrose Oral Gel 15 Gram(s) Oral once PRN Blood Glucose LESS THAN 70 milliGRAM(s)/deciliter  heparin   Injectable 2500 Unit(s) IV Push every 6 hours PRN For aPTT between 40 - 57  heparin   Injectable 5500 Unit(s) IV Push every 6 hours PRN For aPTT less than 40      Allergies    No Known Allergies    Intolerances        REVIEW OF SYSTEMS:  CONSTITUTIONAL: No fever or chills  HEENT:  No headache, no sore throat  RESPIRATORY: No cough, wheezing, +shortness of breath  CARDIOVASCULAR: No chest pain, palpitations  GASTROINTESTINAL: No abd pain, nausea, vomiting, or diarrhea  GENITOURINARY: No dysuria, frequency, or hematuria  NEUROLOGICAL: no focal weakness or dizziness  MUSCULOSKELETAL: no myalgias     Vital Signs Last 24 Hrs  T(C): 36.8 (13 Jun 2025 05:22), Max: 36.8 (12 Jun 2025 21:43)  T(F): 98.2 (13 Jun 2025 05:22), Max: 98.2 (12 Jun 2025 21:43)  HR: 70 (13 Jun 2025 05:22) (65 - 82)  BP: 129/67 (13 Jun 2025 05:22) (109/63 - 129/67)  BP(mean): --  RR: 18 (13 Jun 2025 05:22) (18 - 19)  SpO2: 95% (13 Jun 2025 05:22) (87% - 97%)    Parameters below as of 13 Jun 2025 05:22  Patient On (Oxygen Delivery Method): nasal cannula        PHYSICAL EXAM:  GENERAL: NAD elderly  HEENT:  anicteric, moist mucous membranes  CHEST/LUNG:  diminished b/l, especially R side  HEART:  RRR, S1, S2  ABDOMEN:  BS+, soft, nontender, nondistended  EXTREMITIES: no edema, cyanosis, or calf tenderness  NERVOUS SYSTEM: answers questions and follows commands appropriately    LABS:                        8.8    12.65 )-----------( 287      ( 12 Jun 2025 17:14 )             27.2     CBC Full  -  ( 12 Jun 2025 17:14 )  WBC Count : 12.65 K/uL  Hemoglobin : 8.8 g/dL  Hematocrit : 27.2 %  Platelet Count - Automated : 287 K/uL  Mean Cell Volume : 94.4 fl  Mean Cell Hemoglobin : 30.6 pg  Mean Cell Hemoglobin Concentration : 32.4 g/dL  Auto Neutrophil # : x  Auto Lymphocyte # : x  Auto Monocyte # : x  Auto Eosinophil # : x  Auto Basophil # : x  Auto Neutrophil % : x  Auto Lymphocyte % : x  Auto Monocyte % : x  Auto Eosinophil % : x  Auto Basophil % : x    12 Jun 2025 09:20    139    |  104    |  49     ----------------------------<  177    4.4     |  28     |  1.30     Ca    8.6        12 Jun 2025 09:20  Phos  2.0       12 Jun 2025 09:20  Mg     1.7       12 Jun 2025 09:20    TPro  5.8    /  Alb  3.1    /  TBili  0.7    /  DBili  x      /  AST  <3     /  ALT  23     /  AlkPhos  37     12 Jun 2025 09:20    PTT - ( 12 Jun 2025 23:36 )  PTT:79.6 sec  Urinalysis Basic - ( 12 Jun 2025 09:20 )    Color: x / Appearance: x / SG: x / pH: x  Gluc: 177 mg/dL / Ketone: x  / Bili: x / Urobili: x   Blood: x / Protein: x / Nitrite: x   Leuk Esterase: x / RBC: x / WBC x   Sq Epi: x / Non Sq Epi: x / Bacteria: x      CAPILLARY BLOOD GLUCOSE      POCT Blood Glucose.: 86 mg/dL (13 Jun 2025 08:03)  POCT Blood Glucose.: 211 mg/dL (12 Jun 2025 22:50)  POCT Blood Glucose.: 123 mg/dL (12 Jun 2025 17:00)  POCT Blood Glucose.: 108 mg/dL (12 Jun 2025 12:17)        Culture - Sputum (collected 06-08-25 @ 18:25)  Source: Sputum Sputum  Gram Stain (06-08-25 @ 22:12):    Few Squamous epithelial cells per low power field    Few polymorphonuclear leukocytes per low power field    Few Gram Positive Cocci in Pairs and Chains per oil power field  Final Report (06-10-25 @ 15:22):    Commensal marilee consistent with body site    Urinalysis with Rflx Culture (collected 06-08-25 @ 15:44)    Culture - Urine (collected 06-08-25 @ 15:44)  Source: Clean Catch  Final Report (06-09-25 @ 16:05):    <10,000 CFU/mL Normal Urogenital Marilee    Culture - Blood (collected 06-08-25 @ 11:20)  Source: Blood Blood-Peripheral  Preliminary Report (06-12-25 @ 14:01):    No growth at 4 days    Culture - Blood (collected 06-08-25 @ 11:15)  Source: Blood Blood-Peripheral  Preliminary Report (06-12-25 @ 14:01):    No growth at 4 days        RADIOLOGY & ADDITIONAL TESTS: ____    Personally reviewed.     Consultant(s) Notes Reviewed:  [x] YES  [ ] NO

## 2025-06-13 NOTE — PROGRESS NOTE ADULT - PROBLEM SELECTOR PLAN 7
-chronic recent hospitalization 5/25/25-6/5/25 for AHDF/COPD exacerbation  -Bronchodilators q 6 hrs prn for sob/wheezes  -c/w home med of Advair 500/50 q 12 hrs  -c/w home med tiotropium   -Supplemental O2 prn to maintain SPO2 > 92% -- taper o2

## 2025-06-13 NOTE — PROGRESS NOTE ADULT - SUBJECTIVE AND OBJECTIVE BOX
CHIEF COMPLAINT/ REASON FOR VISIT  .. Patient was seen to address the  issue listed under PROBLEM LIST which is located toward bottom of this note     MARTINEZ PATEL TELE 306 W1    Allergies    No Known Allergies    Intolerances        PAST MEDICAL & SURGICAL HISTORY:  COPD (chronic obstructive pulmonary disease)      DM (diabetes mellitus)  diet-controlled      PAF (paroxysmal atrial fibrillation)  s/p ablation      Hiatal hernia      H/O aplastic anemia      History of IBS      H/O osteoporosis      HLD (hyperlipidemia)      PMR (polymyalgia rheumatica)      History of basal cell cancer      Chronic kidney disease (CKD)      Hypotension      Presence of IVC filter      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      History of immunodeficiency      Lumbar compression fracture      H/O hiatal hernia      H/O pulmonary fibrosis      H/O sinus bradycardia      History of fracture of right hip  "unoperable"      S/P hysterectomy      S/P foot surgery      S/P knee surgery      After cataract  bilateral eye cataract surgically removed      Closed right hip fracture  rigth hip ORIF july 19 2016      S/P IVC filter  nov 2016      S/P carpal tunnel release  Right 2008 & 6/27/17      H/O kyphoplasty      Port-A-Cath in place  right chest          FAMILY HISTORY:  Family history of bladder cancer (Mother)    Family history of myocardial infarction (Father)    FH: atrial fibrillation (Father)        Home Medications:  amiodarone 200 mg oral tablet: 0.5 tab(s) orally once a day (08 Jun 2025 16:43)  Bentyl 10 mg oral capsule: 1 cap(s) orally 2 times a day, As Needed (08 Jun 2025 16:43)  Eliquis 2.5 mg oral tablet: 1 tab(s) orally 2 times a day (08 Jun 2025 16:43)  esomeprazole 20 mg oral delayed release capsule: 1 cap(s) orally once a day (08 Jun 2025 16:43)  fluticasone-salmeterol 500 mcg-50 mcg/inh inhalation powder: 1 puff(s) inhaled 2 times a day (08 Jun 2025 16:43)  folic acid 1 mg oral tablet: 1 tab(s) orally once a day (in the morning) (08 Jun 2025 16:43)  gabapentin 400 mg oral capsule: 1 cap(s) orally 2 times a day (08 Jun 2025 16:43)  ipratropium-albuterol 0.5 mg-2.5 mg/3 mL inhalation solution: 3 milliliter(s) inhaled every 6 hours As needed Shortness of Breath and/or Wheezing (08 Jun 2025 16:43)  IVIG monthly:  (08 Jun 2025 16:43)  leflunomide 10 mg oral tablet: 1 tab(s) orally once a day (08 Jun 2025 16:43)  midodrine 10 mg oral tablet: 1 tab(s) orally every 8 hours (08 Jun 2025 16:43)  montelukast 10 mg oral tablet: 1 tab(s) orally once a day (08 Jun 2025 16:43)  pantoprazole 40 mg oral delayed release tablet: 1 tab(s) orally once a day (before a meal) (08 Jun 2025 16:43)  predniSONE 5 mg oral tablet: 1 tab(s) orally once a day (08 Jun 2025 16:43)  Procrit 10,000 units/mL preservative-free injectable solution: injectable once a week WEDNESDAY (08 Jun 2025 16:43)  Reclast Infusion , IV x 1 yearly:  (08 Jun 2025 16:43)  simvastatin 10 mg oral tablet: 1 tab(s) orally once a day (at bedtime) (08 Jun 2025 16:43)  tiotropium 2.5 mcg/inh inhalation aerosol: 2 puff(s) inhaled once a day (08 Jun 2025 16:43)  tiZANidine: 2 tab(s) orally once a day (at bedtime) as needed for  muscle spasm (08 Jun 2025 16:43)  torsemide 20 mg oral tablet: 1 tab(s) orally once a day (in the morning) (08 Jun 2025 16:49)      MEDICATIONS  (STANDING):  albuterol/ipratropium for Nebulization 3 milliLiter(s) Nebulizer every 6 hours  aMIOdarone    Tablet 100 milliGRAM(s) Oral daily  chlorhexidine 2% Cloths 1 Application(s) Topical <User Schedule>  dextrose 5%. 1000 milliLiter(s) (50 mL/Hr) IV Continuous <Continuous>  dextrose 5%. 1000 milliLiter(s) (100 mL/Hr) IV Continuous <Continuous>  dextrose 50% Injectable 25 Gram(s) IV Push once  dextrose 50% Injectable 12.5 Gram(s) IV Push once  dextrose 50% Injectable 25 Gram(s) IV Push once  droxidopa 100 milliGRAM(s) Oral every 8 hours  fluticasone propionate/ salmeterol 500-50 MICROgram(s) Diskus 1 Dose(s) Inhalation two times a day  gabapentin 400 milliGRAM(s) Oral every 12 hours  glucagon  Injectable 1 milliGRAM(s) IntraMuscular once  heparin  Infusion.  Unit(s)/Hr (12 mL/Hr) IV Continuous <Continuous>  hydrocortisone sodium succinate Injectable 50 milliGRAM(s) IV Push daily  insulin lispro (ADMELOG) corrective regimen sliding scale   SubCutaneous three times a day before meals  insulin lispro (ADMELOG) corrective regimen sliding scale   SubCutaneous at bedtime  midodrine 10 milliGRAM(s) Oral every 8 hours  montelukast 10 milliGRAM(s) Oral daily  pantoprazole  Injectable 40 milliGRAM(s) IV Push daily  piperacillin/tazobactam IVPB.. 3.375 Gram(s) IV Intermittent every 8 hours  tiotropium 2.5 MICROgram(s) Inhaler 2 Puff(s) Inhalation daily    MEDICATIONS  (PRN):  acetaminophen     Tablet .. 650 milliGRAM(s) Oral every 6 hours PRN Mild Pain (1 - 3)  dextrose Oral Gel 15 Gram(s) Oral once PRN Blood Glucose LESS THAN 70 milliGRAM(s)/deciliter  heparin   Injectable 2500 Unit(s) IV Push every 6 hours PRN For aPTT between 40 - 57  heparin   Injectable 5500 Unit(s) IV Push every 6 hours PRN For aPTT less than 40      Diet, Regular:   DASH/TLC Sodium & Cholesterol Restricted (06-08-25 @ 15:56) [Active]          Vital Signs Last 24 Hrs  T(C): 36.8 (13 Jun 2025 05:22), Max: 36.8 (12 Jun 2025 21:43)  T(F): 98.2 (13 Jun 2025 05:22), Max: 98.2 (12 Jun 2025 21:43)  HR: 70 (13 Jun 2025 05:22) (65 - 82)  BP: 129/67 (13 Jun 2025 05:22) (109/63 - 129/67)  BP(mean): --  RR: 18 (13 Jun 2025 05:22) (18 - 19)  SpO2: 95% (13 Jun 2025 05:22) (87% - 97%)    Parameters below as of 13 Jun 2025 05:22  Patient On (Oxygen Delivery Method): nasal cannula                  LABS:                        8.8    12.65 )-----------( 287      ( 12 Jun 2025 17:14 )             27.2     06-12    139  |  104  |  49[H]  ----------------------------<  177[H]  4.4   |  28  |  1.30    Ca    8.6      12 Jun 2025 09:20  Phos  2.0     06-12  Mg     1.7     06-12    TPro  5.8[L]  /  Alb  3.1[L]  /  TBili  0.7  /  DBili  x   /  AST  <3[L]  /  ALT  23  /  AlkPhos  37[L]  06-12    PTT - ( 12 Jun 2025 23:36 )  PTT:79.6 sec  Urinalysis Basic - ( 12 Jun 2025 09:20 )    Color: x / Appearance: x / SG: x / pH: x  Gluc: 177 mg/dL / Ketone: x  / Bili: x / Urobili: x   Blood: x / Protein: x / Nitrite: x   Leuk Esterase: x / RBC: x / WBC x   Sq Epi: x / Non Sq Epi: x / Bacteria: x            WBC:  WBC Count: 12.65 K/uL (06-12 @ 17:14)  WBC Count: 8.82 K/uL (06-12 @ 09:20)  WBC Count: 5.92 K/uL (06-11 @ 06:45)  WBC Count: 7.40 K/uL (06-11 @ 03:30)  WBC Count: 9.87 K/uL (06-10 @ 20:29)  WBC Count: 9.24 K/uL (06-10 @ 06:40)      MICROBIOLOGY:  RECENT CULTURES:  06-08 Sputum Sputum XXXX   Few Squamous epithelial cells per low power field  Few polymorphonuclear leukocytes per low power field  Few Gram Positive Cocci in Pairs and Chains per oil power field   Commensal marilee consistent with body site    06-08 Clean Catch XXXX XXXX   <10,000 CFU/mL Normal Urogenital Marilee    06-08 Blood Blood-Peripheral XXXX XXXX   No growth at 4 days    06-08 Blood Blood-Peripheral XXXX XXXX   No growth at 4 days                PTT - ( 12 Jun 2025 23:36 )  PTT:79.6 sec    Sodium:  Sodium: 139 mmol/L (06-12 @ 09:20)  Sodium: 141 mmol/L (06-11 @ 06:45)  Sodium: 139 mmol/L (06-10 @ 06:40)      1.30 mg/dL 06-12 @ 09:20  1.30 mg/dL 06-11 @ 06:45  1.30 mg/dL 06-10 @ 06:40      Hemoglobin:  Hemoglobin: 8.8 g/dL (06-12 @ 17:14)  Hemoglobin: 8.8 g/dL (06-12 @ 09:20)  Hemoglobin: 7.1 g/dL (06-11 @ 06:45)  Hemoglobin: 7.6 g/dL (06-11 @ 03:30)  Hemoglobin: 7.4 g/dL (06-10 @ 20:29)  Hemoglobin: 7.6 g/dL (06-10 @ 06:40)      Platelets: Platelet Count - Automated: 287 K/uL (06-12 @ 17:14)  Platelet Count - Automated: 271 K/uL (06-12 @ 09:20)  Platelet Count - Automated: 263 K/uL (06-11 @ 06:45)  Platelet Count - Automated: 213 K/uL (06-11 @ 03:30)  Platelet Count - Automated: 260 K/uL (06-10 @ 20:29)  Platelet Count - Automated: 213 K/uL (06-10 @ 06:40)      LIVER FUNCTIONS - ( 12 Jun 2025 09:20 )  Alb: 3.1 g/dL / Pro: 5.8 g/dL / ALK PHOS: 37 U/L / ALT: 23 U/L / AST: <3 U/L / GGT: x             Urinalysis Basic - ( 12 Jun 2025 09:20 )    Color: x / Appearance: x / SG: x / pH: x  Gluc: 177 mg/dL / Ketone: x  / Bili: x / Urobili: x   Blood: x / Protein: x / Nitrite: x   Leuk Esterase: x / RBC: x / WBC x   Sq Epi: x / Non Sq Epi: x / Bacteria: x        RADIOLOGY & ADDITIONAL STUDIES:      MICROBIOLOGY:  RECENT CULTURES:  06-08 Sputum Sputum XXXX   Few Squamous epithelial cells per low power field  Few polymorphonuclear leukocytes per low power field  Few Gram Positive Cocci in Pairs and Chains per oil power field   Commensal marilee consistent with body site    06-08 Clean Catch XXXX XXXX   <10,000 CFU/mL Normal Urogenital Marilee    06-08 Blood Blood-Peripheral XXXX XXXX   No growth at 4 days    06-08 Blood Blood-Peripheral XXXX XXXX   No growth at 4 days

## 2025-06-13 NOTE — PROGRESS NOTE ADULT - ASSESSMENT
84 yr old female with PMHx afib on eliquis, COPD (not on home O2), PE/Rt lower extremity DVT 2017, Rt LE IVC filter 2017 CKD3, aplastic anemia, polymyalgia rheumatica on prednisone 5 mg po qd, requiring PRBC transfusions ~8 weeks (via Rt subclavian mediport), AVN bilat hips, HTN, HLD, HFpEF (75% 6.2.25), diastolic dysfx grade1, recent hospitalization 5/25/25-6/5/25 for AHDF/COPD exacerbation, pt also with hx of IVC filter, had Rt subclavian mediport, who was admitted on 6/8 with c/o Rt sided chest pain. general malaise. Pt stated began to feel ill evening before presentation, daughter this a.m. endorsed pt lethargic, pale. Patient was initially admitted in the ICU for hypotension requiring pressors. She is transferred out of the ICU. She still reports some R sided chest pain and SOB. Reports cough which started when she arrived in the hospital. Has some blood tinged sputum.    ID consulted for pneumonia. CT chest showed new multifocal infection predominantly involving the right upper lobe. Suspect respiratory symptoms multifactorial in the setting of CHF, anemia. Blood cultures remain no growth. COVID/FLU/RSV negative. MRSA nasal PCR, urine strep and legionella antigen negative. Respiratory culture grew commensal marilee consistent with body site.    Patient still reports hemoptysis, although appears stable from respiratory standpoint. WBC increased to 15, but remains on IV steroids. No fevers.    #R sided pneumonia  #Acute hypoxic respiratory failure    -continue Zosyn--duration pending bronch, clinical improvement  -if any clinical change overnight suggest switching to vancomycin and cefepime  -tentative plan for bronschoscpy on Monday  -Quantiferon, serum Fungitell and galactomannan pending  -follow cultures to completion  -discussed with Dr. Sahil Velazco MD  Division of Infectious Diseases   Cell [phone] between 8am and 6pm   After 6pm and weekends please call ID service at [phone].     35 minutes spent on total encounter assessing patient, examination, chart review, counseling and coordinating care by the attending physician/nurse/care manager.

## 2025-06-13 NOTE — PROGRESS NOTE ADULT - PROBLEM SELECTOR PLAN 8
chronic rate controlled   monitor sputum production ( blood tinged today )   would benefit overall from therapeutic AC but given low h/h  c/w amiodarone 100mg qd.  - 6/12 heparin gtt

## 2025-06-13 NOTE — PROGRESS NOTE ADULT - ASSESSMENT
84 female PMH of A-fib on Eliquis, COPD, CKD, aplastic anemia, polymyalgia rheumatica, on chronic steroids, avascular necrosis of hips, HLD, HTN, DM, recent admission to [place] 5/26 through 6/5/2025 for CHF/fluid overload/COPD exacerbation, presents to the ED form [facility] for evaluation of fever and generalized feeling of being unwell, found to be septic and hypotensive.     Anemia/Atypical CP, PAfib, HTN  - With symptomatic anemia.   - s/p PRBC's for Hgb 7.  No clear evidence of bleeding  - Back on Heparin gtt for now (being given in lieu of home Eliquis).  Heme following for MDS  - Transfuse per Primary    - No known Hx of CAD  - CP likely in the setting of significant anemia or pleuritic.  Trops x 2 neg  - Had atypical CP, 6/11  - CxR shows a focal consolidation in the right lung.  Abx per Primary  - Initiate incentive spirometer  - EKG with no signs of acute ischemia    - Bp stable    - Has baseline dysautonomia with resultant severe orthostasis.    - s/p IV resuscitation and IV pressor  - Continue Midodrine and Northera    - ECHO 6/2/25:  LVEF 71%, LVH, and mod pHTN.  No other significant valvular disease  - Would hold her diuretic therapy at this time given that she appears dry on exam.  - Wean O2; satting  on 1L NC    - Hx of paroxysmal atrial fibrillation and DVT/PE  - Now back on Heparin gtt.  To switch to Eliquis eventually  - off tele   - Continue home Amiodarone 100 mg daily    - Monitor and replete electrolytes. Keep K>4.0 and Mg>2.0.  - Will continue to follow    Hussain Sal DNP, AGACNP-BC  Cardiology   Call Teams

## 2025-06-13 NOTE — PROGRESS NOTE ADULT - ASSESSMENT
REASON FOR VISIT  .. Management of problems listed below      EVENTS/CURRENT ISSUES.  . 6/13/2025 continues to have mild hemoptysis but is also on iv ufh drip   . 6/13/2025 dw pt re rbaa of bronch and se agrees scheduled for 6/16 10 in or   . 6/13/2025 dw cardio and id   . 6/11/2025 qft funfitell and galactomannan orderd   . 6/11/2025 pt wa sstarted on iv ufh for hemoptysis and pleuritic chest pain but was stopped when vq showed vlp   . 6/10/2025 Mild hemoptysis   . 6/9 tr out of icu  . 6/8/2025  . PNEUMONIA RML 6/8/2025  . SHOCK 6/8/2025   . NOREPI 6/8/2025   . STRESS STEROIDS   .... HYDROCORTISONE 6/8/2025  . A fib (chronic) POA 6/8/2025  . ANEMIA Hb 6/8/2025 Hb 7.5  . KIMBERLY ON CKD Cr 6/8/2025 Cr 1.9        REVIEW OF SYMPTOMS   Able to give ROS  Yes     RELIABILITY +/-   CONSTITUTIONAL Weakness Yes    ENDOCRINE  No heat or cold intolerance    ALLERGY No hives  Sore throat No stridor  RESP Shortness of breath YES   NEURO New weakness No   CARDIAC   Palpitations No         PHYSICAL EXAM    HEENT Unremarkable  atraumatic   RESP Fair air entry  Harsh breath sound   CARDIAC S1 S2 No S3     NO JVD    ABDOMEN No hepatosplenomegaly   PEDAL EDEMA present No calf tenderness    REASON FOR VISIT  .. Management of problems listed below      CC.   . 6/8/2025 brought in by ems for right sided chest pain that started at 3am- nonradiating- patient was offered aspirin by ems- patient refused and stated to ems that she is on plavix and was advised not to take aspirin. patient on 43 litres nasl cannula-     OVERALL PRESENTATION.  . 6/8/2025 84 yr old female with PMHx afib on eliquis, COPD (not on home O2), PE/Rt lower extremity DVT 2017, Rt LE IVC filter 2017 CKD3, aplastic anemia, polymyalgia rheumatica on prednisone 5 mg po qd, requiring PRBC transfusions ~8 weeks (via Rt subclavian mediport), AVN bilat hips, HTN, HLD, HFpEF (75% 6.2.25), diastolic dysfx grade1, recent hospitalization 5/25/25-6/5/25 for ADHF/COPD exacerbation, Pt with eventual improvement and transfer to [facility] facility from where she presents to [facility]. this a.m. 6/8/25 with c/o Rt sided chest pain. general malaise. Pt stated began to feel ill evening before presentation, daughter this a.m. endorsed pt lethargic, pale.     In E.D. Pt found to have fever with tmax of 101, KIMBERLY on CKD with sCr 1.90 (baseline 1.2-1.6), HYPOtnsive with SBP 80's (pt on midodrine therapy, usual 's). In addition found to have leukocytosis of 37.6 (baseline 5.25 6/5/25), procalcitonin of 13.6, Hgb 7.5, elevated INR 2.27,  Chest xray demonstrated RML consolidations, concerning for pneum. In E.D. pt receiving 500 cc's NS, Vanco 1 gm, zosyn. Pt received tylenol 1 gm IV x1.     PMH.        BEST PRACTICE ISSUES.  . HOB ELEVATN.    .... Yes  . DIET  .   .... DASH 6/8/2025   . FREE WATER.   ....   .  IV FLUID.  ..... 6/8 lr 40 x 12 h   . PHARMAC DVT PPLX .    .... 6/12/2025 iv ufh (hospitalist)  .... 6/11/2025 [facility] 5k.2   .... 6/10/2025 4:46 PM iv ufh   .... [facility] 6/9-6/10/2025   . NON PHARMAC DVT PPLX .      . STRESS ULCR PPLX .   ....   . DATE/DM MGMT.   ..... See under Endocrine section   GENERAL DATA .   . COVID.         .... scv2 6/8/2025 scv2 (-)   . GOC.    ....    . ICU STAY.    .... 6/8-6/9   . INFECTION PPLX .   .... CHLORHEXIDINE 2% 6/8/2025   . ALLGY.   ....  nka  . WT.   .... 6/8/2025 69   . BMI.  ....6/8/2025 26      [id number]  VITALS/GAS EXCHANGE/DRIPS    ABGS.     .  VS/ PO/IO/ VENT/ DRIPS.  6/13/2025 afeb 70 120/60     [id number]  PROBLEM ASSESSMENT RECOMMENDATIONS.  RESP.   . GAS EXCHANGE .   .... target PO 90-95%     . COPD   .... ADVAIR 500 6/8/2025   .... MONTELUKAST 10 6/8/2025   .... SPIRIVA 6/8/2025     . RESP FAILURE  .... 6/8/2025 Has ho hypercapnia   .... 6/8/2025 recommend monitor abg    . MILD HEMOPTYSIS 6/10/2025   .... ct ch 6/10/2025 cw 5/31  ........ new mf infection in rul   ........ unchanged small r greater than left pl effsn   ....... enlarged pulm artery suggesting pulm hytn   .... 6/10/2025  dw hemat cardio id and instead of restarting apixa will start iv UFH which can be easily reversed in case Hb starts dropping but will be the rx for venous thromboembolism as well as for a fib   .... v duplx 6/10 (-)  .... vq 6/10 vlp   . 6/13/2025 continues to have mild hemoptysis but is also on iv ufh drip   . 6/13/2025 dw pt re rbaa of bronch and se agrees scheduled for 6/16 10 in or   . 6/13/2025 dw cardio and id     . PLEURITIC CHEST PAIN RO VTE   .... v duplx 6/10 (-)  .... vq 6/10 vlp   .... 6/12/2025 iv ufh was stopped 6/11 as vq vlp but was restarted by hospitalist 6/12/2025 for af       INFECTION.  . DATA  .... pr 6/8-6/10-6/13/2025 or 13.6 - 9.2 - 15.9   .... esr 6/8/2025 esr 17   .... w 6/8-6/9/2025 w 32 - 24   .... ua 6/8/2025 w 4   .... cxr 6/8/2025 cxr dense infiltra r upper hilum r mediport  .... ct ch 6/10 cw 5/31  ........ r greater than l effs   ........ partial rll atelectasis   ........ new patchy and nodular areas of consolidation rul and associated ground glass opacities   ........ ground glass and nodular opac also scott   ........ numerous tib rml and rll   .... flu ab 6/8/2025 (-)   .... rsv 6/8/2025 (-)    .... mrsa 6/9/2025 (-)  .... uc 6/8 (-)  .... bc 6/8 (-)     . PNEUMONIA RUL 6/8/2025   .... AZITHRO 6/8-6/11 /2025   .... ZOSYN 6/8/2025     CARDIAC.  . PAIN CHEST   .... 6/10/2025 co pain chest r   .... 6/10/2025 check ct to see if she has pleurisy   .... 6/10/2025  dw hemat cardio id and instead of restarting apixa will start iv UFH which can be easily reversed in case Hb starts dropping but will be the rx for venous thromboembolism as well as for a fib     . STRESS STEROIDS   .... HYDROCORTISONE   ........ 6/8/2025 h 50.4  ........ 6/10/2025 h 50.2   ........ 6/12/2025 hydrocort 50     . SHOCK   .... MIDODRINE 6/8/2025 m 10.3   .... DROXIDOPA 6/9/2025 d 100.3   .... NOREPI 6/8/2025 n 8/250   .... target map 65 (+)     . CAD    .... Trop 6/8-6/9/2025 Tr 32- 24     . LACTICEMIA   .... la 6/8/2025 la 1.4     . CHF  .... pbnp 6/8/2025 pbnp 6599   .... tte 4/2025 mild ph  n vf  .... tte 6/2/2025   ........ ef 71%   ........ n rvsf    ........ pasp 54   ....... la mod dilatd     . A fib (chronic) POA 6/8/2025  .... AMIODARONE 100 6/8/2025  .... 6/10/2025  dw hemat cardio id and instead of restarting apixa will start iv UFH which can be easily reversed in case Hb starts dropping but will be the rx for venous thromboembolism as well as for a fib   .... 6/12/2025 iv ufh     HEMAT.  . DATA  .... Plt 6/8/2025 plt 153   .... inr 6/8-6/9/2025 inr 2.27- 1.63      . ANEMIA   .... Hb 6/8-6/9-6/10-6/11-6/13/2025   .... Hb 7.5- 7.8 - 7.6- 7.1 - 8.4     . TRANSFUSION  .... 6/8  1 u prbc      GI.   . DIET .   .... dash 6/8/2025     . LFT MONITORING   .... LFTS    6/8/2025  ........ AP     39  ........ AST   11  ........ ALT    35    RENAL.  . DATA  .... Na 6/8/2025 Na 137  .... K 6/8/2025 K 4   .... CO2 6/8/2025 co2 29   .... ag 6/8/2025 ag 9  .... alb 6/8/2025 alb 3.4     . KIMBERLY ON CKD   .... Cr 6/8-6/9-6/10-6/113-6/1/2025   .... Cr 1.9 - 1.4 - 1.3 - 1.3 - 1.1   .... Cr 5/27-5/28-5/29-5/30-6/1/2025   .... Cr 1.2 - 1.3 - 1.3 - 1.6 - 1.6    ENDO.  . DM  .  .... 6/8/2025 SL SCALE     NEURO.  . PAIN  .... GABAPENTIN 6/8/2025 g 400.2         [id number]   SUMMARY BASELINE .     84 yr old female pt of Dr Gallegos not on home ox or home cpap used to use trelegy lives with spouse quit 40 y 1/2 ppd with PMHx afib on eliquis, COPD (not on home O2), PE/Rt lower extremity DVT 2017, Rt LE IVC filter 2017 CKD3, aplastic anemia, polymyalgia rheumatica on prednisone 5 mg po qd, requiring PRBC transfusions around 8 weeks (via Rt subclavian mediport), AVN bilat hips, HTN, HLD, HFpEF (75% 6.2.25), diastolic dysfx grade1, recent hospitalization 5/25/25-6/5/25 for ADHF/COPD exacerbation, Pt with eventual improvement and transfer to [facility] facility   CC.   . 6/8/2025 R CHEST PAIN   MAIN ISSUES.  . COPD   . MILD HEMOPTYSOIS 6/10/2025   . PNEUMONIA RML 6/8/2025  .... ZOSYN 6/8/2025   . PLEURITIS CHEST PAIN 6/10/2025  .... 6/10 vte excluded vlp vq   . SHOCK 6/8/2025   .... resolvd tr oo icu 6/9   . NOREPI 6/8-6/9/2025   . STRESS STEROIDS   .... HYDROCORTISONE 6/8/2025  . A fib (chronic) POA 6/8/2025  .... 6/10-6/10/2025 IV UFH   .... 6/12 IV UFH   . ANEMIA Hb 6/8-6/13/2025 Hb 7.5- 8.4  . TRANSFUSION  .... 6/8  1 u prbc    . KIMBERLY ON CKD Cr 6/8-6/13/2025 Cr 1.9- 1.1  PMH.   . AFon Eliquis,   . COPD (not on home o2),   . CKD,   . aplastic anemia,   . TRANSFUSION 6/2/2025 1 u prbc   . PMR (chronic prednisone with AVN hip),   . HLD,   . HTN,   . DM    PROCEDURES/DEVICES .  PAST PROCEDURES   . r mediport poa 6/8/2025     DISCUSSIONS.  .... Discussed with primary care and relevant consultants on an ongoing basis       TIME SPENT.  . Over 36 minutes aggregate care time spent on encounter; activities included   direct patient care, counseling and/or coordinating care reviewing notes, lab data/ imaging , discussion with multidisciplinary team/ patient  /family and explaining in detail risks, benefits, alternatives  of the recommendations     ROBERT HENRIQUEZ 84 6/8/2025 1942

## 2025-06-13 NOTE — PROGRESS NOTE ADULT - PROBLEM SELECTOR PLAN 2
- The patient is noted to have blood-tinged sputum  - requiring PRBC transfusions ~8 weeks (via Rt subclavian mediport)  - Hgb 7.6 (baseline appears to be ~8)   - transfuse <7-7.5  - Will hold Eliquis until clinical improvement is noted  - 6/11 1 unit pRBC, continue to hold eliquis  - 6/12 heparin gtt

## 2025-06-13 NOTE — PROGRESS NOTE ADULT - ATTENDING COMMENTS
84F h/o AFib on Eliquis, COPD, CKD, aplastic anemia, polymyalgia rheumatica on chronic steroids, avascular necrosis of hips, HTN, HLD, with recent admission to [place] 5/26 - 6/5 for acute HFpEF exacerbation and COPD exacerbation, discharged to rehab on 6/5 and returned to ED with right sided chest pain, general malaise and feeling unwell. Admitted to ICU for septic shock and acute hypoxic respiratory failure likely 2/2 to PNA and KIMBERLY. Now downgraded to tele floors. Continue IV Zosyn - plan for 7 day course of ID Dr Velazco. Legionella negative, off Azithromycin. Follow up Quantiferon, serum Fungitell and galactomannan. Taper off supplemental O2 with appropriate SpO2.   Leukocytosis likely related to steroids. Decreased to daily dosing.   s/p 1 unit pRBC with appropriate increase in H/H. Continue Heparin gtt for now (in lieu of home Eliquis) - if H/H downtrends will hold and discuss with cardio and heme need for full dose AC. Discussed with heme Dr Parker.   Continue midodrine and notherna.  Discussed with pulm Dr Baxter may need bronch on Monday due to persistent bloody sputum.   Discussed with daughter Lenore aware and in agreement with above.

## 2025-06-13 NOTE — PROGRESS NOTE ADULT - ASSESSMENT
84F h/o AFib on Eliquis, COPD, CKD, aplastic anemia, polymyalgia rheumatica on chronic steroids, avascular necrosis of hips, HTN, HLD, with recent admission to [facility] 5/26 - 6/5 for acute HFpEF exacerbation and COPD exacerbation, discharged to rehab on 6/5 and returning today with right sided chest pain, general malaise and feeling unwell. She reports some slight cough though otherwise no other new symptoms reported. Found to be hypotensive, febrile w/ leukocytosis. Admitted to ICU for septic shock likely 2/2 to PNA and KIMBERLY. Now stable for downgrade to tele.

## 2025-06-14 DIAGNOSIS — Z87.898 PERSONAL HISTORY OF OTHER SPECIFIED CONDITIONS: ICD-10-CM

## 2025-06-14 LAB
ANION GAP SERPL CALC-SCNC: 8 MMOL/L — SIGNIFICANT CHANGE UP (ref 5–17)
BUN SERPL-MCNC: 34 MG/DL — HIGH (ref 7–23)
CALCIUM SERPL-MCNC: 8.7 MG/DL — SIGNIFICANT CHANGE UP (ref 8.5–10.1)
CHLORIDE SERPL-SCNC: 104 MMOL/L — SIGNIFICANT CHANGE UP (ref 96–108)
CO2 SERPL-SCNC: 28 MMOL/L — SIGNIFICANT CHANGE UP (ref 22–31)
CREAT SERPL-MCNC: 1 MG/DL — SIGNIFICANT CHANGE UP (ref 0.5–1.3)
EGFR: 56 ML/MIN/1.73M2 — LOW
EGFR: 56 ML/MIN/1.73M2 — LOW
FUNGITELL: 52 PG/ML — SIGNIFICANT CHANGE UP
GLUCOSE SERPL-MCNC: 114 MG/DL — HIGH (ref 70–99)
HCT VFR BLD CALC: 23 % — LOW (ref 34.5–45)
HGB BLD-MCNC: 7.3 G/DL — LOW (ref 11.5–15.5)
MCHC RBC-ENTMCNC: 30 PG — SIGNIFICANT CHANGE UP (ref 27–34)
MCHC RBC-ENTMCNC: 31.7 G/DL — LOW (ref 32–36)
MCV RBC AUTO: 94.7 FL — SIGNIFICANT CHANGE UP (ref 80–100)
NRBC BLD AUTO-RTO: 0 /100 WBCS — SIGNIFICANT CHANGE UP (ref 0–0)
PLATELET # BLD AUTO: 241 K/UL — SIGNIFICANT CHANGE UP (ref 150–400)
POTASSIUM SERPL-MCNC: 4.2 MMOL/L — SIGNIFICANT CHANGE UP (ref 3.5–5.3)
POTASSIUM SERPL-SCNC: 4.2 MMOL/L — SIGNIFICANT CHANGE UP (ref 3.5–5.3)
RBC # BLD: 2.43 M/UL — LOW (ref 3.8–5.2)
RBC # FLD: 22.8 % — HIGH (ref 10.3–14.5)
SODIUM SERPL-SCNC: 140 MMOL/L — SIGNIFICANT CHANGE UP (ref 135–145)
WBC # BLD: 18.77 K/UL — HIGH (ref 3.8–10.5)
WBC # FLD AUTO: 18.77 K/UL — HIGH (ref 3.8–10.5)

## 2025-06-14 PROCEDURE — 99233 SBSQ HOSP IP/OBS HIGH 50: CPT

## 2025-06-14 PROCEDURE — 99232 SBSQ HOSP IP/OBS MODERATE 35: CPT

## 2025-06-14 PROCEDURE — 99233 SBSQ HOSP IP/OBS HIGH 50: CPT | Mod: GC

## 2025-06-14 PROCEDURE — G0545: CPT

## 2025-06-14 RX ORDER — FUROSEMIDE 10 MG/ML
40 INJECTION INTRAMUSCULAR; INTRAVENOUS ONCE
Refills: 0 | Status: COMPLETED | OUTPATIENT
Start: 2025-06-14 | End: 2025-06-14

## 2025-06-14 RX ORDER — HYDROCORTISONE 20 MG
25 TABLET ORAL DAILY
Refills: 0 | Status: DISCONTINUED | OUTPATIENT
Start: 2025-06-14 | End: 2025-06-16

## 2025-06-14 RX ADMIN — GABAPENTIN 400 MILLIGRAM(S): 400 CAPSULE ORAL at 06:03

## 2025-06-14 RX ADMIN — IPRATROPIUM BROMIDE AND ALBUTEROL SULFATE 3 MILLILITER(S): .5; 2.5 SOLUTION RESPIRATORY (INHALATION) at 14:07

## 2025-06-14 RX ADMIN — IPRATROPIUM BROMIDE AND ALBUTEROL SULFATE 3 MILLILITER(S): .5; 2.5 SOLUTION RESPIRATORY (INHALATION) at 19:56

## 2025-06-14 RX ADMIN — Medication 25 GRAM(S): at 13:42

## 2025-06-14 RX ADMIN — MONTELUKAST SODIUM 10 MILLIGRAM(S): 10 TABLET ORAL at 21:12

## 2025-06-14 RX ADMIN — FUROSEMIDE 40 MILLIGRAM(S): 10 INJECTION INTRAMUSCULAR; INTRAVENOUS at 20:03

## 2025-06-14 RX ADMIN — Medication 25 GRAM(S): at 01:01

## 2025-06-14 RX ADMIN — Medication 25 GRAM(S): at 07:43

## 2025-06-14 RX ADMIN — DROXIDOPA 100 MILLIGRAM(S): 300 CAPSULE ORAL at 21:12

## 2025-06-14 RX ADMIN — TIOTROPIUM BROMIDE INHALATION SPRAY 2 PUFF(S): 3.12 SPRAY, METERED RESPIRATORY (INHALATION) at 06:59

## 2025-06-14 RX ADMIN — HEPARIN SODIUM 1200 UNIT(S)/HR: 1000 INJECTION INTRAVENOUS; SUBCUTANEOUS at 07:11

## 2025-06-14 RX ADMIN — GABAPENTIN 400 MILLIGRAM(S): 400 CAPSULE ORAL at 17:29

## 2025-06-14 RX ADMIN — HEPARIN SODIUM 1200 UNIT(S)/HR: 1000 INJECTION INTRAVENOUS; SUBCUTANEOUS at 06:27

## 2025-06-14 RX ADMIN — AMIODARONE HYDROCHLORIDE 100 MILLIGRAM(S): 50 INJECTION, SOLUTION INTRAVENOUS at 06:04

## 2025-06-14 RX ADMIN — DROXIDOPA 100 MILLIGRAM(S): 300 CAPSULE ORAL at 06:04

## 2025-06-14 RX ADMIN — Medication 50 MILLIGRAM(S): at 06:03

## 2025-06-14 RX ADMIN — IPRATROPIUM BROMIDE AND ALBUTEROL SULFATE 3 MILLILITER(S): .5; 2.5 SOLUTION RESPIRATORY (INHALATION) at 01:16

## 2025-06-14 RX ADMIN — INSULIN LISPRO 2: 100 INJECTION, SOLUTION INTRAVENOUS; SUBCUTANEOUS at 12:02

## 2025-06-14 RX ADMIN — Medication 40 MILLIGRAM(S): at 12:04

## 2025-06-14 RX ADMIN — Medication 1 DOSE(S): at 07:00

## 2025-06-14 RX ADMIN — Medication 1 APPLICATION(S): at 06:02

## 2025-06-14 RX ADMIN — Medication 1 DOSE(S): at 20:03

## 2025-06-14 RX ADMIN — DROXIDOPA 100 MILLIGRAM(S): 300 CAPSULE ORAL at 12:02

## 2025-06-14 RX ADMIN — IPRATROPIUM BROMIDE AND ALBUTEROL SULFATE 3 MILLILITER(S): .5; 2.5 SOLUTION RESPIRATORY (INHALATION) at 08:06

## 2025-06-14 NOTE — PROGRESS NOTE ADULT - ASSESSMENT
REASON FOR VISIT  .. Management of problems listed below      EVENTS/CURRENT ISSUES.  . 6/14/2025 iv ufh dced   . 6/13/2025 continues to have mild hemoptysis but is also on iv ufh drip   . 6/13/2025 dw pt re rbaa of bronch and se agrees scheduled for 6/16 10 in or   . 6/13/2025 dw cardio and id   . 6/11/2025 qft funfitell and galactomannan orderd   . 6/11/2025 pt wa sstarted on iv ufh for hemoptysis and pleuritic chest pain but was stopped when vq showed vlp   . 6/10/2025 Mild hemoptysis   . 6/9 tr out of icu  . 6/8/2025  . PNEUMONIA RML 6/8/2025  . SHOCK 6/8/2025   . NOREPI 6/8/2025   . STRESS STEROIDS   .... HYDROCORTISONE 6/8/2025  . A fib (chronic) POA 6/8/2025  . ANEMIA Hb 6/8/2025 Hb 7.5  . KIMBERLY ON CKD Cr 6/8/2025 Cr 1.9        REVIEW OF SYMPTOMS   Able to give ROS  Yes     RELIABILITY +/-   CONSTITUTIONAL Weakness Yes    ENDOCRINE  No heat or cold intolerance    ALLERGY No hives  Sore throat No stridor  RESP Shortness of breath YES   NEURO New weakness No   CARDIAC   Palpitations No         PHYSICAL EXAM    HEENT Unremarkable  atraumatic   RESP Fair air entry  Harsh breath sound   CARDIAC S1 S2 No S3     NO JVD    ABDOMEN No hepatosplenomegaly   PEDAL EDEMA present No calf tenderness    REASON FOR VISIT  .. Management of problems listed below      CC.   . 6/8/2025 brought in by ems for right sided chest pain that started at 3am- nonradiating- patient was offered aspirin by ems- patient refused and stated to ems that she is on plavix and was advised not to take aspirin. patient on 43 litres nasl cannula-     OVERALL PRESENTATION.  . 6/8/2025 84 yr old female with PMHx afib on eliquis, COPD (not on home O2), PE/Rt lower extremity DVT 2017, Rt LE IVC filter 2017 CKD3, aplastic anemia, polymyalgia rheumatica on prednisone 5 mg po qd, requiring PRBC transfusions ~8 weeks (via Rt subclavian mediport), AVN bilat hips, HTN, HLD, HFpEF (75% 6.2.25), diastolic dysfx grade1, recent hospitalization 5/25/25-6/5/25 for ADHF/COPD exacerbation, Pt with eventual improvement and transfer to [facility] facility from where she presents to [facility]. this a.m. 6/8/25 with c/o Rt sided chest pain. general malaise. Pt stated began to feel ill evening before presentation, daughter this a.m. endorsed pt lethargic, pale.     In E.D. Pt found to have fever with tmax of 101, KIMBERLY on CKD with sCr 1.90 (baseline 1.2-1.6), HYPOtnsive with SBP 80's (pt on midodrine therapy, usual 's). In addition found to have leukocytosis of 37.6 (baseline 5.25 6/5/25), procalcitonin of 13.6, Hgb 7.5, elevated INR 2.27,  Chest xray demonstrated RML consolidations, concerning for pneum. In E.D. pt receiving 500 cc's NS, Vanco 1 gm, zosyn. Pt received tylenol 1 gm IV x1.     PMH.        BEST PRACTICE ISSUES.  . HOB ELEVATN.    .... Yes  . DIET  .   .... DASH 6/8/2025   . FREE WATER.   ....   .  IV FLUID.  ..... 6/8 lr 40 x 12 h   . PHARMAC DVT PPLX .    .... 6/12-6/14/2025 iv ufh (hospitalist)  .... 6/11/2025 [facility] 5k.2   .... 6/10/2025 4:46 PM iv ufh   .... [facility] 6/9-6/10/2025   . NON PHARMAC DVT PPLX .      . STRESS ULCR PPLX .   ....   . DATE/DM MGMT.   ..... See under Endocrine section   GENERAL DATA .   . COVID.         .... scv2 6/8/2025 scv2 (-)   . GOC.    ....    . ICU STAY.    .... 6/8-6/9   . INFECTION PPLX .   .... CHLORHEXIDINE 2% 6/8/2025   . ALLGY.   ....  nka  . WT.   .... 6/8/2025 69   . BMI.  ....6/8/2025 26    [id number]  VITALS/GAS EXCHANGE/DRIPS    ABGS.     .  VS/ PO/IO/ VENT/ DRIPS.  6/14/2025 99 78 130/60   6/14/2025 2l94%    XXXX[id number]  PROBLEM ASSESSMENT RECOMMENDATIONS.  RESP.   . GAS EXCHANGE .   .... target PO 90-95%     . COPD   .... ADVAIR 500 6/8/2025   .... MONTELUKAST 10 6/8/2025   .... SPIRIVA 6/8/2025     . RESP FAILURE  .... 6/8/2025 Has ho hypercapnia   .... 6/8/2025 recommend monitor abg    . MILD HEMOPTYSIS 6/10/2025   .... ct ch 6/10/2025 cw 5/31  ........ new mf infection in rul   ........ unchanged small r greater than left pl effsn   ....... enlarged pulm artery suggesting pulm hytn   .... 6/10/2025  dw hemat cardio id and instead of restarting apixa will start iv UFH which can be easily reversed in case Hb starts dropping but will be the rx for venous thromboembolism as well as for a fib   .... v duplx 6/10 (-)  .... vq 6/10 vlp   . 6/13/2025 continues to have mild hemoptysis but is also on iv ufh drip   . 6/13/2025 dw pt re rbaa of bronch and se agrees scheduled for 6/16 10 in or   . 6/13/2025 dw cardio and id     . PLEURITIC CHEST PAIN RO VTE   .... v duplx 6/10 (-)  .... vq 6/10 vlp   .... 6/12/2025 iv ufh was stopped 6/11 as vq vlp but was restarted by hospitalist 6/12/2025 for af       INFECTION.  . DATA  .... w 6/8-6/10-6/13-6/14/2025 w 13.6 - 9.2 - 15.9 - 18  .... esr 6/8/2025 esr 17   .... w 6/8-6/9/2025 w 32 - 24   .... ua 6/8/2025 w 4   .... cxr 6/8/2025 cxr dense infiltra r upper hilum r mediport  .... ct ch 6/10 cw 5/31  ........ r greater than l effs   ........ partial rll atelectasis   ........ new patchy and nodular areas of consolidation rul and associated ground glass opacities   ........ ground glass and nodular opac also scott   ........ numerous tib rml and rll     . MICROBIO  .... sp 6/8 n commensals   .... flu ab 6/8/2025 (-)   .... rsv 6/8/2025 (-)    .... mrsa 6/9/2025 (-)  .... uc 6/8 (-)  .... bc 6/8 (-)     . PNEUMONIA RUL 6/8/2025   .... AZITHRO 6/8-6/11 /2025   .... ZOSYN 6/8-6/15/2025     CARDIAC.  . PAIN CHEST   .... 6/10/2025 co pain chest r   .... 6/10/2025 check ct to see if she has pleurisy   .... 6/10/2025  dw hemat cardio id and instead of restarting apixa will start iv UFH which can be easily reversed in case Hb starts dropping but will be the rx for venous thromboembolism as well as for a fib     . STRESS STEROIDS   .... HYDROCORTISONE   ........ 6/8/2025 h 50.4  ........ 6/10/2025 h 50.2   ........ 6/12/2025 hydrocort 50   ....... 6/14/2025 h 25    . SHOCK   .... MIDODRINE 6/8/2025 m 10.3   .... DROXIDOPA 6/9/2025 d 100.3   .... NOREPI 6/8-6/9/2025 n 8/250   .... target map 65 (+)     . CAD    .... Trop 6/8-6/9/2025 Tr 32- 24     . LACTICEMIA   .... la 6/8/2025 la 1.4     . CHF  .... pbnp 6/8/2025 pbnp 6599   .... tte 4/2025 mild ph  n vf  .... tte 6/2/2025   ........ ef 71%   ........ n rvsf    ........ pasp 54   ....... la mod dilatd   .... lasix 40 once 6/14/2025    . A fib (chronic) POA 6/8/2025  .... AMIODARONE 100 6/8/2025  .... 6/10/2025  dw hemat cardio id and instead of restarting apixa will start iv UFH which can be easily reversed in case Hb starts dropping but will be the rx for venous thromboembolism as well as for a fib   .... 6/12-6/14/2025 iv ufh     HEMAT.  . DATA  .... Plt 6/8/2025 plt 153   .... inr 6/8-6/9/2025 inr 2.27- 1.63      . ANEMIA   .... Hb 6/8-6/9-6/10-6/11-6/13-6/14/2025   .... Hb 7.5- 7.8 - 7.6- 7.1 - 8.4 - 7.3    . TRANSFUSION  .... 6/8  1 u prbc      GI.   . DIET .   .... dash 6/8/2025     . LFT MONITORING   .... LFTS    6/8/2025  ........ AP     39  ........ AST   11  ........ ALT    35    RENAL.  . DATA  .... Na 6/8/2025 Na 137  .... K 6/8/2025 K 4   .... CO2 6/8/2025 co2 29   .... ag 6/8/2025 ag 9  .... alb 6/8/2025 alb 3.4     . KIMBERLY ON CKD   .... Cr 6/8-6/9-6/10-6/11-6/13-6/14/2025   .... Cr 1.9 - 1.4 - 1.3 - 1.3 - 1.1 - 1  .... Cr 5/27-5/28-5/29-5/30-6/1/2025   .... Cr 1.2 - 1.3 - 1.3 - 1.6 - 1.6    ENDO.  . DM  .  .... 6/8/2025 SL SCALE     NEURO.  . PAIN  .... GABAPENTIN 6/8/2025 g 400.2       [id number]   SUMMARY BASELINE .     84 yr old female pt of Dr Gallegos not on home ox or home cpap used to use trelegy lives with spouse quit 40 y 1/2 ppd with PMHx afib on eliquis, COPD (not on home O2), PE/Rt lower extremity DVT 2017, Rt LE IVC filter 2017 CKD3, aplastic anemia, polymyalgia rheumatica on prednisone 5 mg po qd, requiring PRBC transfusions around 8 weeks (via Rt subclavian mediport), AVN bilat hips, HTN, HLD, HFpEF (75% 6.2.25), diastolic dysfx grade1, recent hospitalization 5/25/25-6/5/25 for ADHF/COPD exacerbation, Pt with eventual improvement and transfer to [facility] facility   CC.   . 6/8/2025 R CHEST PAIN   MAIN ISSUES.  . COPD   . MILD HEMOPTYSOIS 6/10/2025   . PNEUMONIA RML 6/8/2025  .... ZOSYN 6/8/2025   . PLEURITIS CHEST PAIN 6/10/2025  .... 6/10 vte excluded vlp vq   . SHOCK 6/8/2025   .... resolvd tr oo icu 6/9   . NOREPI 6/8-6/9/2025   . STRESS STEROIDS   .... HYDROCORTISONE 6/8/2025  . A fib (chronic) POA 6/8/2025  .... 6/10-6/10/2025 IV UFH   .... 6/12 IV UFH   . ANEMIA Hb 6/8-6/13/2025 Hb 7.5- 8.4  . TRANSFUSION  .... 6/8  1 u prbc    . KIMBERLY ON CKD Cr 6/8-6/13/2025 Cr 1.9- 1.1  PMH.   . AFon Eliquis,   . COPD (not on home o2),   . CKD,   . aplastic anemia,   . TRANSFUSION 6/2/2025 1 u prbc   . PMR (chronic prednisone with AVN hip),   . HLD,   . HTN,   . DM    PROCEDURES/DEVICES .  PAST PROCEDURES   . r mediport poa 6/8/2025     DISCUSSIONS.  .... Discussed with primary care and relevant consultants on an ongoing basis       TIME SPENT.  . Over 36 minutes aggregate care time spent on encounter; activities included   direct patient care, counseling and/or coordinating care reviewing notes, lab data/ imaging , discussion with multidisciplinary team/ patient  /family and explaining in detail risks, benefits, alternatives  of the recommendations     ROBERT HENRIQUEZ 84 6/8/2025 1942

## 2025-06-14 NOTE — PROGRESS NOTE ADULT - ASSESSMENT
84 female PMH of A-fib on Eliquis, COPD, CKD, aplastic anemia, polymyalgia rheumatica, on chronic steroids, avascular necrosis of hips, HLD, HTN, DM, recent admission to [place] 5/26 through 6/5/2025 for CHF/fluid overload/COPD exacerbation, presents to the ED form [facility] for evaluation of fever and generalized feeling of being unwell, found to be septic and hypotensive.     Anemia/Atypical CP, PAfib, HTN  - With symptomatic anemia.   - s/p PRBC's for Hgb 7.     - Eliquis on hold  -  Heparin gtt held for hemoptysis for now   Heme following for MDS  - Transfuse per Primary  -For bronch by pulm 6/16    - No known Hx of CAD  - CP likely in the setting of significant anemia or pleuritic.  Trops x 2 neg  - Had atypical CP, 6/11  - CxR shows a focal consolidation in the right lung.  Abx per Primary  - Initiate incentive spirometer  - EKG with no signs of acute ischemia    - Bp stable    - Has baseline dysautonomia with resultant severe orthostasis.    - s/p IV resuscitation and IV pressor  - Continue Midodrine and Northera    - ECHO 6/2/25:  LVEF 71%, LVH, and mod pHTN.  No other significant valvular disease  - Would hold her diuretic therapy at this time given that she appears dry on exam.  - Wean O2; satting  on 1L NC    - Hx of paroxysmal atrial fibrillation and DVT/PE  - - Eliquis on hold  -  Heparin gtt held for hemoptysis for now   Heme following for MDS  - off tele   - Continue home Amiodarone 100 mg daily    - Monitor and replete electrolytes. Keep K>4.0 and Mg>2.0.  - Will continue to follow    Hussain Sal DNP, [facility]  Cardiology   Call Teams

## 2025-06-14 NOTE — PROGRESS NOTE ADULT - SUBJECTIVE AND OBJECTIVE BOX
11 Hour(s) 24 Minute(s) [facility]  INFECTIOUS DISEASES - [address], [place] [address]  Tel: [phone]     Fax: [phone]  =======================================================    MARTINEZ JONES [id number]    Follow up: No fevers. Still with hemoptysis. Heparin discontinued.    Allergies:  No Known Allergies      Antibiotics:  acetaminophen     Tablet .. 650 milliGRAM(s) Oral every 6 hours PRN  albuterol/ipratropium for Nebulization 3 milliLiter(s) Nebulizer every 6 hours  aMIOdarone    Tablet 100 milliGRAM(s) Oral daily  chlorhexidine 2% Cloths 1 Application(s) Topical <User Schedule>  dextrose 5%. 1000 milliLiter(s) IV Continuous <Continuous>  dextrose 5%. 1000 milliLiter(s) IV Continuous <Continuous>  dextrose 50% Injectable 25 Gram(s) IV Push once  dextrose 50% Injectable 12.5 Gram(s) IV Push once  dextrose 50% Injectable 25 Gram(s) IV Push once  dextrose Oral Gel 15 Gram(s) Oral once PRN  droxidopa 100 milliGRAM(s) Oral every 8 hours  fluticasone propionate/ salmeterol 500-50 MICROgram(s) Diskus 1 Dose(s) Inhalation two times a day  furosemide   Injectable 40 milliGRAM(s) IV Push once  gabapentin 400 milliGRAM(s) Oral every 12 hours  glucagon  Injectable 1 milliGRAM(s) IntraMuscular once  hydrocortisone sodium succinate Injectable 50 milliGRAM(s) IV Push daily  insulin lispro (ADMELOG) corrective regimen sliding scale   SubCutaneous three times a day before meals  insulin lispro (ADMELOG) corrective regimen sliding scale   SubCutaneous at bedtime  midodrine 10 milliGRAM(s) Oral every 8 hours  montelukast 10 milliGRAM(s) Oral daily  pantoprazole  Injectable 40 milliGRAM(s) IV Push daily  piperacillin/tazobactam IVPB.. 3.375 Gram(s) IV Intermittent every 8 hours  tiotropium 2.5 MICROgram(s) Inhaler 2 Puff(s) Inhalation daily       REVIEW OF SYSTEMS:  CONSTITUTIONAL:  No Fever or chills  HEENT:  No sore throat or runny nose.  CARDIOVASCULAR: + R sided chest pain--similar  RESPIRATORY:  + cough, shortness of breath  GASTROINTESTINAL:  No nausea, vomiting or diarrhea.  GENITOURINARY:  No dysuria  MUSCULOSKELETAL:  no back pain  NEUROLOGIC:  No headache or dizziness  PSYCHIATRIC:  No disorder of thought or mood.     Physical Exam:  ICU Vital Signs Last 24 Hrs  T(C): 36.1 (14 Jun 2025 04:52), Max: 37.1 (13 Jun 2025 21:43)  T(F): 97 (14 Jun 2025 04:52), Max: 98.7 (13 Jun 2025 21:43)  HR: 74 (14 Jun 2025 08:06) (68 - 96)  BP: 131/63 (14 Jun 2025 04:52) (115/61 - 131/63)  BP(mean): --  ABP: --  ABP(mean): --  RR: 18 (14 Jun 2025 04:52) (18 - 18)  SpO2: 97% (14 Jun 2025 08:06) (93% - 97%)    O2 Parameters below as of 14 Jun 2025 08:06  Patient On (Oxygen Delivery Method): nasal cannula        GEN: NAD, sitting up in chair  HEENT: normocephalic and atraumatic.   NECK: Supple.   LUNGS: Normal respiratory effort  HEART: Regular rate and rhythm   ABDOMEN: Soft, nontender, and nondistended.    EXTREMITIES: B/L Lower leg edema.  NEUROLOGIC: Answering questions appropriately  PSYCHIATRIC: Appropriate affect .    Labs:  06-14    140  |  104  |  34[H]  ----------------------------<  114[H]  4.2   |  28  |  1.00    Ca    8.7      14 Jun 2025 08:13  Phos  2.1     06-13  Mg     1.6     06-13                            7.3    18.77 )-----------( 241      ( 14 Jun 2025 08:13 )             23.0     PTT - ( 13 Jun 2025 07:30 )  PTT:68.8 sec  Urinalysis Basic - ( 14 Jun 2025 08:13 )    Color: x / Appearance: x / SG: x / pH: x  Gluc: 114 mg/dL / Ketone: x  / Bili: x / Urobili: x   Blood: x / Protein: x / Nitrite: x   Leuk Esterase: x / RBC: x / WBC x   Sq Epi: x / Non Sq Epi: x / Bacteria: x          RECENT CULTURES:  06-08 @ 18:25 Sputum Sputum     Commensal marilee consistent with body site    Few Squamous epithelial cells per low power field  Few polymorphonuclear leukocytes per low power field  Few Gram Positive Cocci in Pairs and Chains per oil power field      06-08 @ 15:44 Clean Catch     <10,000 CFU/mL Normal Urogenital Marilee        06-08 @ 11:20 Blood Blood-Peripheral     No growth at 5 days        06-08 @ 11:15 Blood Blood-Peripheral     No growth at 5 days              All imaging and data are reviewed.      18Hour(s) 22Minute(s)

## 2025-06-14 NOTE — PROGRESS NOTE ADULT - ATTENDING COMMENTS
84F h/o AFib on Eliquis, COPD, CKD, aplastic anemia, polymyalgia rheumatica on chronic steroids, avascular necrosis of hips, HTN, HLD, with recent admission to [place] 5/26 - 6/5 for acute HFpEF exacerbation and COPD exacerbation, discharged to rehab on 6/5 and returned to ED with right sided chest pain, general malaise and feeling unwell. Admitted to ICU for septic shock and acute hypoxic respiratory failure likely 2/2 to PNA and KIMBERLY. Now downgraded to tele floors. Continue IV Zosyn - discussed with ID Dr Velazco continue for now pending bronchoscopy on Monday. Legionella negative, off Azithromycin. Follow up Quantiferon, serum Fungitell and galactomannan. Taper off supplemental O2 with appropriate SpO2.   Leukocytosis likely related to steroids. Decreased to daily dosing - taper steroids to discontinuation.  Anemia - decreasing H/H - stop heparin gtt. 2 units pRBC ordered plan for IV Lasix 40mg in between dosing - discussed with Dr Keith lyles.   Continue midodrine and notherna.  Discussed with daughter Lenore aware and in agreement with above.

## 2025-06-14 NOTE — PROGRESS NOTE ADULT - PROBLEM SELECTOR PLAN 9
chronic rate controlled   monitor sputum production ( blood tinged today )   would benefit overall from therapeutic AC but given low h/h  c/w amiodarone 100mg qd.  - 6/12 heparin gtt started  - 6/13 : heparin gtt now discontinued 2/2 hemoptysis

## 2025-06-14 NOTE — PROGRESS NOTE ADULT - SUBJECTIVE AND OBJECTIVE BOX
Interval History:  remains SOB  heparin stopped  receiving 2 un its PRBC    Chart reviewed and events noted;   Overnight events:    MEDICATIONS  (STANDING):  albuterol/ipratropium for Nebulization 3 milliLiter(s) Nebulizer every 6 hours  aMIOdarone    Tablet 100 milliGRAM(s) Oral daily  chlorhexidine 2% Cloths 1 Application(s) Topical <User Schedule>  dextrose 5%. 1000 milliLiter(s) (100 mL/Hr) IV Continuous <Continuous>  dextrose 5%. 1000 milliLiter(s) (50 mL/Hr) IV Continuous <Continuous>  dextrose 50% Injectable 25 Gram(s) IV Push once  dextrose 50% Injectable 12.5 Gram(s) IV Push once  dextrose 50% Injectable 25 Gram(s) IV Push once  droxidopa 100 milliGRAM(s) Oral every 8 hours  fluticasone propionate/ salmeterol 500-50 MICROgram(s) Diskus 1 Dose(s) Inhalation two times a day  gabapentin 400 milliGRAM(s) Oral every 12 hours  glucagon  Injectable 1 milliGRAM(s) IntraMuscular once  hydrocortisone sodium succinate Injectable 25 milliGRAM(s) IV Push daily  insulin lispro (ADMELOG) corrective regimen sliding scale   SubCutaneous at bedtime  insulin lispro (ADMELOG) corrective regimen sliding scale   SubCutaneous three times a day before meals  midodrine 10 milliGRAM(s) Oral every 8 hours  montelukast 10 milliGRAM(s) Oral daily  pantoprazole  Injectable 40 milliGRAM(s) IV Push daily  piperacillin/tazobactam IVPB.. 3.375 Gram(s) IV Intermittent every 8 hours  tiotropium 2.5 MICROgram(s) Inhaler 2 Puff(s) Inhalation daily    MEDICATIONS  (PRN):  acetaminophen     Tablet .. 650 milliGRAM(s) Oral every 6 hours PRN Mild Pain (1 - 3)  dextrose Oral Gel 15 Gram(s) Oral once PRN Blood Glucose LESS THAN 70 milliGRAM(s)/deciliter      Vital Signs Last 24 Hrs  T(C): 37.2 (14 Jun 2025 20:12), Max: 37.2 (14 Jun 2025 20:12)  T(F): 99 (14 Jun 2025 20:12), Max: 99 (14 Jun 2025 20:12)  HR: 83 (14 Jun 2025 20:12) (68 - 96)  BP: 134/71 (14 Jun 2025 20:12) (130/64 - 134/71)  BP(mean): --  RR: 18 (14 Jun 2025 20:12) (16 - 18)  SpO2: 94% (14 Jun 2025 20:12) (94% - 98%)    Parameters below as of 14 Jun 2025 20:12  Patient On (Oxygen Delivery Method): nasal cannula  O2 Flow (L/min): 2      PHYSICAL EXAM  General: adult in NAD, chronically ill appearing  HEENT: clear oropharynx, anicteric sclera, pink conjunctivae  Neck: supple  CV: normal S1S2 with no murmur rubs or gallops  Lungs: clear to auscultation, no wheezes, no rhales  Abdomen: soft non-tender non-distended, no hepato/splenomegaly  Ext: no clubbing cyanosis or edema  Skin: hematoma left arm  Neuro: alert and oriented X3 no focal deficits      LABS:  CBC Full  -  ( 14 Jun 2025 08:13 )  WBC Count : 18.77 K/uL  RBC Count : 2.43 M/uL  Hemoglobin : 7.3 g/dL  Hematocrit : 23.0 %  Platelet Count - Automated : 241 K/uL  Mean Cell Volume : 94.7 fl  Mean Cell Hemoglobin : 30.0 pg  Mean Cell Hemoglobin Concentration : 31.7 g/dL  Auto Neutrophil # : x  Auto Lymphocyte # : x  Auto Monocyte # : x  Auto Eosinophil # : x  Auto Basophil # : x  Auto Neutrophil % : x  Auto Lymphocyte % : x  Auto Monocyte % : x  Auto Eosinophil % : x  Auto Basophil % : x    06-14    140  |  104  |  34[H]  ----------------------------<  114[H]  4.2   |  28  |  1.00    Ca    8.7      14 Jun 2025 08:13  Phos  2.1     06-13  Mg     1.6     06-13      PTT - ( 13 Jun 2025 07:30 )  PTT:68.8 sec    fe studies      WBC trend  18.77 K/uL (06-14-25 @ 08:13)  15.96 K/uL (06-13-25 @ 11:40)  12.65 K/uL (06-12-25 @ 17:14)  8.82 K/uL (06-12-25 @ 09:20)      Hgb trend  7.3 g/dL (06-14-25 @ 08:13)  8.4 g/dL (06-13-25 @ 11:40)  8.8 g/dL (06-12-25 @ 17:14)  8.8 g/dL (06-12-25 @ 09:20)      plt trend  241 K/uL (06-14-25 @ 08:13)  273 K/uL (06-13-25 @ 11:40)  287 K/uL (06-12-25 @ 17:14)  271 K/uL (06-12-25 @ 09:20)        RADIOLOGY & ADDITIONAL STUDIES:

## 2025-06-14 NOTE — PROGRESS NOTE ADULT - PROBLEM SELECTOR PLAN 2
In the ED, tmax of 101 w/ KIMBERLY on CKD with Cr 1.90 (baseline 1.2-1.6), Hypotensive with SBP 80's (pt on midodrine therapy, usual 's). also w/ leukocytosis of 32.33. Procalcitonin of 13.6, RVP neg. Pt treated in ICU for septic shock requiring pressors, downgraded from the ICU to telemetry on 6/10  - CT chest 6/10 with multifocal R sided PNA  - continue zosyn; course dependent on bronch  - c/w solucortef 50 q12  - F/U quant, fungitell (specimen received)   - MRSA/MSSA PCR negative  - trend WBC and fever curve, supplemental O2 prn to maintain SPO2 > 92% -- taper o2  - ID consulted Dr Juan A varma appreciated

## 2025-06-14 NOTE — PROGRESS NOTE ADULT - PROBLEM SELECTOR PLAN 1
Patient found to have hemoptysis  - Will discontinue heparin gtt  - Plan for bronch on monday as per pulm  - Heme onc following  - ID following  - Pulm following

## 2025-06-14 NOTE — PROGRESS NOTE ADULT - PROBLEM SELECTOR PLAN 8
-chronic recent hospitalization 5/25/25-6/5/25 for AHDF/COPD exacerbation  -Bronchodilators q 6 hrs prn for sob/wheezes  -c/w home med of Advair 500/50 q 12 hrs  -c/w home med tiotropium   -Supplemental O2 prn to maintain SPO2 > 92% -- taper o2 02-Oct-2024 02:05

## 2025-06-14 NOTE — PROGRESS NOTE ADULT - SUBJECTIVE AND OBJECTIVE BOX
Patient is a 84y old  Female who presents with a chief complaint of septic shock, PNA (14 Jun 2025 09:09)      INTERVAL HPI/OVERNIGHT EVENTS: Patient seen and examined at bedside. No overnight events occurred. Patient admits to mild SOB and consistent hemoptysis. Satting well on 2L NC. Denies fevers, chills, chest pain, palpitations, abdominal pain, n/v.     MEDICATIONS  (STANDING):  albuterol/ipratropium for Nebulization 3 milliLiter(s) Nebulizer every 6 hours  aMIOdarone    Tablet 100 milliGRAM(s) Oral daily  chlorhexidine 2% Cloths 1 Application(s) Topical <User Schedule>  dextrose 5%. 1000 milliLiter(s) (50 mL/Hr) IV Continuous <Continuous>  dextrose 5%. 1000 milliLiter(s) (100 mL/Hr) IV Continuous <Continuous>  dextrose 50% Injectable 25 Gram(s) IV Push once  dextrose 50% Injectable 12.5 Gram(s) IV Push once  dextrose 50% Injectable 25 Gram(s) IV Push once  droxidopa 100 milliGRAM(s) Oral every 8 hours  fluticasone propionate/ salmeterol 500-50 MICROgram(s) Diskus 1 Dose(s) Inhalation two times a day  gabapentin 400 milliGRAM(s) Oral every 12 hours  glucagon  Injectable 1 milliGRAM(s) IntraMuscular once  hydrocortisone sodium succinate Injectable 50 milliGRAM(s) IV Push daily  insulin lispro (ADMELOG) corrective regimen sliding scale   SubCutaneous three times a day before meals  insulin lispro (ADMELOG) corrective regimen sliding scale   SubCutaneous at bedtime  midodrine 10 milliGRAM(s) Oral every 8 hours  montelukast 10 milliGRAM(s) Oral daily  pantoprazole  Injectable 40 milliGRAM(s) IV Push daily  piperacillin/tazobactam IVPB.. 3.375 Gram(s) IV Intermittent every 8 hours  tiotropium 2.5 MICROgram(s) Inhaler 2 Puff(s) Inhalation daily    MEDICATIONS  (PRN):  acetaminophen     Tablet .. 650 milliGRAM(s) Oral every 6 hours PRN Mild Pain (1 - 3)  dextrose Oral Gel 15 Gram(s) Oral once PRN Blood Glucose LESS THAN 70 milliGRAM(s)/deciliter      Allergies    No Known Allergies    Intolerances        REVIEW OF SYSTEMS:  CONSTITUTIONAL: No fever or chills  HEENT:  No headache, no sore throat  RESPIRATORY: + Mild SOB, + Hemoptysis, No cough, wheezing  CARDIOVASCULAR: No chest pain, palpitations  GASTROINTESTINAL: No abd pain, nausea, vomiting, or diarrhea  GENITOURINARY: No dysuria, frequency, or hematuria  NEUROLOGICAL: no focal weakness or dizziness  MUSCULOSKELETAL: no myalgias     Vital Signs Last 24 Hrs  T(C): 36.1 (14 Jun 2025 04:52), Max: 37.1 (13 Jun 2025 21:43)  T(F): 97 (14 Jun 2025 04:52), Max: 98.7 (13 Jun 2025 21:43)  HR: 74 (14 Jun 2025 08:06) (68 - 96)  BP: 131/63 (14 Jun 2025 04:52) (115/61 - 131/63)  BP(mean): --  RR: 18 (14 Jun 2025 04:52) (18 - 18)  SpO2: 97% (14 Jun 2025 08:06) (93% - 97%)    Parameters below as of 14 Jun 2025 08:06  Patient On (Oxygen Delivery Method): nasal cannula        PHYSICAL EXAM:  GENERAL: NAD, on NC   HEENT:  anicteric, moist mucous membranes  CHEST/LUNG:  Decreased breath sounds b/l   HEART:  RRR, S1, S2  ABDOMEN:  BS+, soft, nontender, nondistended  EXTREMITIES: no edema, cyanosis, or calf tenderness  NERVOUS SYSTEM: answers questions and follows commands appropriately    LABS:                        7.3    18.77 )-----------( 241      ( 14 Jun 2025 08:13 )             23.0     CBC Full  -  ( 14 Jun 2025 08:13 )  WBC Count : 18.77 K/uL  Hemoglobin : 7.3 g/dL  Hematocrit : 23.0 %  Platelet Count - Automated : 241 K/uL  Mean Cell Volume : 94.7 fl  Mean Cell Hemoglobin : 30.0 pg  Mean Cell Hemoglobin Concentration : 31.7 g/dL  Auto Neutrophil # : x  Auto Lymphocyte # : x  Auto Monocyte # : x  Auto Eosinophil # : x  Auto Basophil # : x  Auto Neutrophil % : x  Auto Lymphocyte % : x  Auto Monocyte % : x  Auto Eosinophil % : x  Auto Basophil % : x    14 Jun 2025 08:13    140    |  104    |  34     ----------------------------<  114    4.2     |  28     |  1.00     Ca    8.7        14 Jun 2025 08:13      PTT - ( 13 Jun 2025 07:30 )  PTT:68.8 sec  Urinalysis Basic - ( 14 Jun 2025 08:13 )    Color: x / Appearance: x / SG: x / pH: x  Gluc: 114 mg/dL / Ketone: x  / Bili: x / Urobili: x   Blood: x / Protein: x / Nitrite: x   Leuk Esterase: x / RBC: x / WBC x   Sq Epi: x / Non Sq Epi: x / Bacteria: x      CAPILLARY BLOOD GLUCOSE      POCT Blood Glucose.: 126 mg/dL (14 Jun 2025 07:43)  POCT Blood Glucose.: 119 mg/dL (14 Jun 2025 04:27)  POCT Blood Glucose.: 129 mg/dL (13 Jun 2025 21:43)  POCT Blood Glucose.: 114 mg/dL (13 Jun 2025 17:04)  POCT Blood Glucose.: 102 mg/dL (13 Jun 2025 12:16)        Culture - Sputum (collected 06-08-25 @ 18:25)  Source: Sputum Sputum  Gram Stain (06-08-25 @ 22:12):    Few Squamous epithelial cells per low power field    Few polymorphonuclear leukocytes per low power field    Few Gram Positive Cocci in Pairs and Chains per oil power field  Final Report (06-10-25 @ 15:22):    Commensal marilee consistent with body site    Culture - Urine (collected 06-08-25 @ 15:44)  Source: Clean Catch  Final Report (06-09-25 @ 16:05):    <10,000 CFU/mL Normal Urogenital Marilee    Urinalysis with Rflx Culture (collected 06-08-25 @ 15:44)    Culture - Blood (collected 06-08-25 @ 11:20)  Source: Blood Blood-Peripheral  Final Report (06-13-25 @ 14:00):    No growth at 5 days    Culture - Blood (collected 06-08-25 @ 11:15)  Source: Blood Blood-Peripheral  Final Report (06-13-25 @ 14:00):    No growth at 5 days        RADIOLOGY & ADDITIONAL TESTS:    Personally reviewed.     Consultant(s) Notes Reviewed:  [x] YES  [ ] NO

## 2025-06-14 NOTE — PROGRESS NOTE ADULT - PROBLEM SELECTOR PLAN 5
-likely in the setting of sepsis   -on home full AC with Eliquis  - 6/12 heparin gtt started  - 6/13 : heparin gtt now discontinued 2/2 hemoptysis

## 2025-06-14 NOTE — PROGRESS NOTE ADULT - ASSESSMENT
84F h/o AFib on Eliquis, COPD, CKD, aplastic anemia, polymyalgia rheumatica on chronic steroids, avascular necrosis of hips, HTN, HLD, with recent admission to [facility] 5/26 - 6/5 for acute HFpEF exacerbation and COPD exacerbation, discharged to rehab on 6/5 and returning today with right sided chest pain, general malaise and feeling unwell. She reports some slight cough though otherwise no other new symptoms reported. Found to be hypotensive, febrile w/ leukocytosis. Admitted to ICU for septic shock likely 2/2 to PNA and KIMBERLY. Now stable for downgrade to tele.

## 2025-06-14 NOTE — PROGRESS NOTE ADULT - PROBLEM SELECTOR PLAN 6
-most likely pre-renal due to hypotension  (RESOLVED)   -suspect atn due to shock state  -improving indices  -avoid nephrotoxic meds  -nephro following

## 2025-06-14 NOTE — PROGRESS NOTE ADULT - PROBLEM SELECTOR PLAN 10
on home prednisone 5mg daily  and leflunomide (10mg) if restarted Patient will need to bring in home medication to bring to pharmacy to dispense.  -now on IV solumedrol 50mg q6; plan to down titrate in next few days

## 2025-06-14 NOTE — PROGRESS NOTE ADULT - PROBLEM SELECTOR PLAN 3
- The patient is noted to have blood-tinged sputum  - requiring PRBC transfusions ~8 weeks (via Rt subclavian mediport)  - Hgb 7.6 (baseline appears to be ~8)   - transfuse <7-7.5  - Will hold Eliquis until clinical improvement is noted  - 6/12 heparin gtt started  - 6/13 : heparin gtt now discontinued 2/2 hemoptysis - The patient is noted to have blood-tinged sputum  - requiring PRBC transfusions ~8 weeks (via Rt subclavian mediport)  - Hgb 7.6 (baseline appears to be ~8)   - transfuse <7-7.5  - Will hold Eliquis until clinical improvement is noted  - 6/12 heparin gtt started  - 6/13 : heparin gtt now discontinued 2/2 hemoptysis  - Will give 2 unit PRBC- will give lasix 40mg IVP x1 in between units

## 2025-06-14 NOTE — PROGRESS NOTE ADULT - SUBJECTIVE AND OBJECTIVE BOX
CHIEF COMPLAINT/ REASON FOR VISIT  .. Patient was seen to address the  issue listed under PROBLEM LIST which is located toward bottom of this note     [facility] PATEL TELE 306 W1    Allergies    No Known Allergies    Intolerances        PAST MEDICAL & SURGICAL HISTORY:  COPD (chronic obstructive pulmonary disease)      DM (diabetes mellitus)  diet-controlled      PAF (paroxysmal atrial fibrillation)  s/p ablation      Hiatal hernia      H/O aplastic anemia      History of IBS      H/O osteoporosis      HLD (hyperlipidemia)      PMR (polymyalgia rheumatica)      History of basal cell cancer      Chronic kidney disease (CKD)      Hypotension      Presence of IVC filter      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      History of immunodeficiency      Lumbar compression fracture      H/O hiatal hernia      H/O pulmonary fibrosis      H/O sinus bradycardia      History of fracture of right hip  "unoperable"      S/P hysterectomy      S/P foot surgery      S/P knee surgery      After cataract  bilateral eye cataract surgically removed      Closed right hip fracture  rigth hip ORIF july 19 2016      S/P IVC filter  nov 2016      S/P carpal tunnel release  Right 2008 & 6/27/17      H/O kyphoplasty      Port-A-Cath in place  right chest          FAMILY HISTORY:  Family history of bladder cancer (Mother)    Family history of myocardial infarction (Father)    FH: atrial fibrillation (Father)        Home Medications:  amiodarone 200 mg oral tablet: 0.5 tab(s) orally once a day (08 Jun 2025 16:43)  Bentyl 10 mg oral capsule: 1 cap(s) orally 2 times a day, As Needed (08 Jun 2025 16:43)  Eliquis 2.5 mg oral tablet: 1 tab(s) orally 2 times a day (08 Jun 2025 16:43)  esomeprazole 20 mg oral delayed release capsule: 1 cap(s) orally once a day (08 Jun 2025 16:43)  fluticasone-salmeterol 500 mcg-50 mcg/inh inhalation powder: 1 puff(s) inhaled 2 times a day (08 Jun 2025 16:43)  folic acid 1 mg oral tablet: 1 tab(s) orally once a day (in the morning) (08 Jun 2025 16:43)  gabapentin 400 mg oral capsule: 1 cap(s) orally 2 times a day (08 Jun 2025 16:43)  ipratropium-albuterol 0.5 mg-2.5 mg/3 mL inhalation solution: 3 milliliter(s) inhaled every 6 hours As needed Shortness of Breath and/or Wheezing (08 Jun 2025 16:43)  IVIG monthly:  (08 Jun 2025 16:43)  leflunomide 10 mg oral tablet: 1 tab(s) orally once a day (08 Jun 2025 16:43)  midodrine 10 mg oral tablet: 1 tab(s) orally every 8 hours (08 Jun 2025 16:43)  montelukast 10 mg oral tablet: 1 tab(s) orally once a day (08 Jun 2025 16:43)  pantoprazole 40 mg oral delayed release tablet: 1 tab(s) orally once a day (before a meal) (08 Jun 2025 16:43)  predniSONE 5 mg oral tablet: 1 tab(s) orally once a day (08 Jun 2025 16:43)  Procrit 10,000 units/mL preservative-free injectable solution: injectable once a week WEDNESDAY (08 Jun 2025 16:43)  Reclast Infusion , IV x 1 yearly:  (08 Jun 2025 16:43)  simvastatin 10 mg oral tablet: 1 tab(s) orally once a day (at bedtime) (08 Jun 2025 16:43)  tiotropium 2.5 mcg/inh inhalation aerosol: 2 puff(s) inhaled once a day (08 Jun 2025 16:43)  tiZANidine: 2 tab(s) orally once a day (at bedtime) as needed for  muscle spasm (08 Jun 2025 16:43)  torsemide 20 mg oral tablet: 1 tab(s) orally once a day (in the morning) (08 Jun 2025 16:49)      MEDICATIONS  (STANDING):  albuterol/ipratropium for Nebulization 3 milliLiter(s) Nebulizer every 6 hours  aMIOdarone    Tablet 100 milliGRAM(s) Oral daily  chlorhexidine 2% Cloths 1 Application(s) Topical <User Schedule>  dextrose 5%. 1000 milliLiter(s) (50 mL/Hr) IV Continuous <Continuous>  dextrose 5%. 1000 milliLiter(s) (100 mL/Hr) IV Continuous <Continuous>  dextrose 50% Injectable 25 Gram(s) IV Push once  dextrose 50% Injectable 12.5 Gram(s) IV Push once  dextrose 50% Injectable 25 Gram(s) IV Push once  droxidopa 100 milliGRAM(s) Oral every 8 hours  fluticasone propionate/ salmeterol 500-50 MICROgram(s) Diskus 1 Dose(s) Inhalation two times a day  gabapentin 400 milliGRAM(s) Oral every 12 hours  glucagon  Injectable 1 milliGRAM(s) IntraMuscular once  heparin  Infusion.  Unit(s)/Hr (12 mL/Hr) IV Continuous <Continuous>  hydrocortisone sodium succinate Injectable 50 milliGRAM(s) IV Push daily  insulin lispro (ADMELOG) corrective regimen sliding scale   SubCutaneous three times a day before meals  insulin lispro (ADMELOG) corrective regimen sliding scale   SubCutaneous at bedtime  midodrine 10 milliGRAM(s) Oral every 8 hours  montelukast 10 milliGRAM(s) Oral daily  pantoprazole  Injectable 40 milliGRAM(s) IV Push daily  piperacillin/tazobactam IVPB.. 3.375 Gram(s) IV Intermittent every 8 hours  tiotropium 2.5 MICROgram(s) Inhaler 2 Puff(s) Inhalation daily    MEDICATIONS  (PRN):  acetaminophen     Tablet .. 650 milliGRAM(s) Oral every 6 hours PRN Mild Pain (1 - 3)  dextrose Oral Gel 15 Gram(s) Oral once PRN Blood Glucose LESS THAN 70 milliGRAM(s)/deciliter  heparin   Injectable 5500 Unit(s) IV Push every 6 hours PRN For aPTT less than 40  heparin   Injectable 2500 Unit(s) IV Push every 6 hours PRN For aPTT between 40 - 57      Diet, Regular:   DASH/TLC Sodium & Cholesterol Restricted (06-08-25 @ 15:56) [Active]          Vital Signs Last 24 Hrs  T(C): 36.1 (14 Jun 2025 04:52), Max: 37.1 (13 Jun 2025 11:33)  T(F): 97 (14 Jun 2025 04:52), Max: 98.7 (13 Jun 2025 11:33)  HR: 96 (14 Jun 2025 04:52) (68 - 96)  BP: 131/63 (14 Jun 2025 04:52) (115/61 - 131/63)  BP(mean): --  RR: 18 (14 Jun 2025 04:52) (18 - 18)  SpO2: 94% (14 Jun 2025 01:19) (93% - 95%)    Parameters below as of 14 Jun 2025 01:19  Patient On (Oxygen Delivery Method): nasal cannula          06-13-25 @ 07:01  -  06-14-25 @ 07:00  --------------------------------------------------------  IN: 144 mL / OUT: 300 mL / NET: -156 mL              LABS:                        8.4    15.96 )-----------( 273      ( 13 Jun 2025 11:40 )             25.7     06-13    137  |  106  |  40[H]  ----------------------------<  101[H]  4.5   |  27  |  1.10    Ca    9.0      13 Jun 2025 16:10  Phos  2.1     06-13  Mg     1.6     06-13    TPro  5.8[L]  /  Alb  3.1[L]  /  TBili  0.7  /  DBili  x   /  AST  <3[L]  /  ALT  23  /  AlkPhos  37[L]  06-12    PTT - ( 13 Jun 2025 07:30 )  PTT:68.8 sec  Urinalysis Basic - ( 13 Jun 2025 16:10 )    Color: x / Appearance: x / SG: x / pH: x  Gluc: 101 mg/dL / Ketone: x  / Bili: x / Urobili: x   Blood: x / Protein: x / Nitrite: x   Leuk Esterase: x / RBC: x / WBC x   Sq Epi: x / Non Sq Epi: x / Bacteria: x            WBC:  WBC Count: 15.96 K/uL (06-13 @ 11:40)  WBC Count: 12.65 K/uL (06-12 @ 17:14)  WBC Count: 8.82 K/uL (06-12 @ 09:20)  WBC Count: 5.92 K/uL (06-11 @ 06:45)  WBC Count: 7.40 K/uL (06-11 @ 03:30)  WBC Count: 9.87 K/uL (06-10 @ 20:29)      MICROBIOLOGY:  RECENT CULTURES:  06-08 Sputum Sputum XXXX   Few Squamous epithelial cells per low power field  Few polymorphonuclear leukocytes per low power field  Few Gram Positive Cocci in Pairs and Chains per oil power field   Commensal marilee consistent with body site    06-08 Clean Catch XXXX XXXX   <10,000 CFU/mL Normal Urogenital Marilee    06-08 Blood Blood-Peripheral XXXX XXXX   No growth at 5 days    06-08 Blood Blood-Peripheral XXXX XXXX   No growth at 5 days                PTT - ( 13 Jun 2025 07:30 )  PTT:68.8 sec    Sodium:  Sodium: 137 mmol/L (06-13 @ 16:10)  Sodium: 136 mmol/L (06-13 @ 07:30)  Sodium: 139 mmol/L (06-12 @ 09:20)  Sodium: 141 mmol/L (06-11 @ 06:45)      1.10 mg/dL 06-13 @ 16:10  1.20 mg/dL 06-13 @ 07:30  1.30 mg/dL 06-12 @ 09:20  1.30 mg/dL 06-11 @ 06:45      Hemoglobin:  Hemoglobin: 8.4 g/dL (06-13 @ 11:40)  Hemoglobin: 8.8 g/dL (06-12 @ 17:14)  Hemoglobin: 8.8 g/dL (06-12 @ 09:20)  Hemoglobin: 7.1 g/dL (06-11 @ 06:45)  Hemoglobin: 7.6 g/dL (06-11 @ 03:30)  Hemoglobin: 7.4 g/dL (06-10 @ 20:29)      Platelets: Platelet Count - Automated: 273 K/uL (06-13 @ 11:40)  Platelet Count - Automated: 287 K/uL (06-12 @ 17:14)  Platelet Count - Automated: 271 K/uL (06-12 @ 09:20)  Platelet Count - Automated: 263 K/uL (06-11 @ 06:45)  Platelet Count - Automated: 213 K/uL (06-11 @ 03:30)  Platelet Count - Automated: 260 K/uL (06-10 @ 20:29)      LIVER FUNCTIONS - ( 12 Jun 2025 09:20 )  Alb: 3.1 g/dL / Pro: 5.8 g/dL / ALK PHOS: 37 U/L / ALT: 23 U/L / AST: <3 U/L / GGT: x             Urinalysis Basic - ( 13 Jun 2025 16:10 )    Color: x / Appearance: x / SG: x / pH: x  Gluc: 101 mg/dL / Ketone: x  / Bili: x / Urobili: x   Blood: x / Protein: x / Nitrite: x   Leuk Esterase: x / RBC: x / WBC x   Sq Epi: x / Non Sq Epi: x / Bacteria: x        RADIOLOGY & ADDITIONAL STUDIES:      MICROBIOLOGY:  RECENT CULTURES:  06-08 Sputum Sputum XXXX   Few Squamous epithelial cells per low power field  Few polymorphonuclear leukocytes per low power field  Few Gram Positive Cocci in Pairs and Chains per oil power field   Commensal marilee consistent with body site    06-08 Clean Catch XXXX XXXX   <10,000 CFU/mL Normal Urogenital Marilee    06-08 Blood Blood-Peripheral XXXX XXXX   No growth at 5 days    06-08 Blood Blood-Peripheral XXXX XXXX   No growth at 5 days

## 2025-06-14 NOTE — PROGRESS NOTE ADULT - ASSESSMENT
IMPRESSION  J18.8:     Other pneumonia, unspecified organism  A41.8:    Other specified sepsis  D46.7:    Other myelodysplastic syndromes  J44.0:     Chronic obstructive pulmonary disease with acute lower respiratory infection  I50          congestive heart failure  D63.8     Anemia chronic disease  D63.1     Anemia CKD  N18.31   CKD3a    Myelodysplasia who is transfusion and procrit dependent recently admitted with CHF and COPD exacerbation  Hgb declined during last admission s/p 1UPRBC 06/02   Hgb 7.6 increased to 8.5 post transfusion  Recently DC to [facility]  readmitted with pneumonia, sepsis to MICU.   transfered to medical unit 06/10/25  hgb 7.3 today      RECOMMENDATIONS  follow CBC and transfuse as indicated  heparin has been discontinued  continue present cardiac management  receiving 2 units PRBC today

## 2025-06-14 NOTE — PROGRESS NOTE ADULT - PROBLEM SELECTOR PLAN 12
#VTE ppx: - Hep gtt discontinued 2/2 hemoptysis   #GI ppx: iv ppi  #Diet: Diet, Regular: DASH/TLC {Sodium & Cholesterol Restricted} (06-08-25 @ 15:56) [Active]  #Activity: Activity - Increase As Tolerated:   Time/Priority:  Routine (06-08-25 @ 14:16) [Active]  #ACP: full code  #Dispo: further plans pending clinical course

## 2025-06-14 NOTE — PROGRESS NOTE ADULT - ASSESSMENT
84 yr old female with PMHx afib on eliquis, COPD (not on home O2), PE/Rt lower extremity DVT 2017, Rt LE IVC filter 2017 CKD3, aplastic anemia, polymyalgia rheumatica on prednisone 5 mg po qd, requiring PRBC transfusions ~8 weeks (via Rt subclavian mediport), AVN bilat hips, HTN, HLD, HFpEF (75% 6.2.25), diastolic dysfx grade1, recent hospitalization 5/25/25-6/5/25 for AHDF/COPD exacerbation, pt also with hx of IVC filter, had Rt subclavian mediport, who was admitted on 6/8 with c/o Rt sided chest pain. general malaise. Pt stated began to feel ill evening before presentation, daughter this a.m. endorsed pt lethargic, pale. Patient was initially admitted in the ICU for hypotension requiring pressors. She is transferred out of the ICU. She still reports some R sided chest pain and SOB. Reports cough which started when she arrived in the hospital. Has some blood tinged sputum.    ID consulted for pneumonia. CT chest showed new multifocal infection predominantly involving the right upper lobe. Suspect respiratory symptoms multifactorial in the setting of CHF, anemia. Blood cultures remain no growth. COVID/FLU/RSV negative. MRSA nasal PCR, urine strep and legionella antigen negative. Respiratory culture grew commensal marilee consistent with body site.    Patient still reports hemoptysis, although appears stable from respiratory standpoint. Plan for bronchoscpy this Monday and heparin discontinued. WBC increased to 18, but remains on IV steroids, will monitor closely. No fevers and no obvious new complaints.    #R sided pneumonia  #Hemoptysis  #Acute hypoxic respiratory failure    -continue Zosyn--duration pending bronch, clinical improvement  -if any clinical change overnight suggest switching to vancomycin and cefepime  -plan for bronschoscpy on Monday  -Quantiferon, serum Fungitell and galactomannan pending  -follow cultures to completion  -monitor WBC--consider CT chest and A/P if worse  -discussed with Dr. Baxter and Dr. Lisy Velazco MD  Division of Infectious Diseases   Cell [phone] between 8am and 6pm   After 6pm and weekends please call ID service at [phone].     35 minutes spent on total encounter assessing patient, examination, chart review, counseling and coordinating care by the attending physician/nurse/care manager.

## 2025-06-14 NOTE — PROGRESS NOTE ADULT - PROBLEM SELECTOR PLAN 4
Suspect secondary to shock (RESOLVED)   -continue midodrine 10 mg every 8 hours  -TTE performed 6/2 showing normal LV function with EF 71% and LV hypertrophy, normal RV size and function, moderate LA dilation   - TTE: EF 67%, mod TR, R. pleural effusion   - The patient has been weaned off of vasopressors, monitor hemodynamics closely  -cardio following

## 2025-06-14 NOTE — PROGRESS NOTE ADULT - SUBJECTIVE AND OBJECTIVE BOX
[facility] -- Sai Valle Pannella, Patel, Savella, Goodger, Cohen: Office # [phone]    Follow Up:  Hypotension      Subjective/Observations: No events overnight resting comfortably in bed.  No complaints of chest pain, dyspnea, or palpitations reported. No signs of orthopnea or PND. Wearing nasal cannula 2 L Wearing nasal cannula C/O cough w/ hemoptysis      REVIEW OF SYSTEMS: All other review of systems are negative unless indicated above    PAST MEDICAL & SURGICAL HISTORY:  COPD (chronic obstructive pulmonary disease)      DM (diabetes mellitus)  diet-controlled      PAF (paroxysmal atrial fibrillation)  s/p ablation      Hiatal hernia      H/O aplastic anemia      History of IBS      H/O osteoporosis      HLD (hyperlipidemia)      PMR (polymyalgia rheumatica)      History of basal cell cancer      Chronic kidney disease (CKD)      Hypotension      Presence of IVC filter      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      History of immunodeficiency      Lumbar compression fracture      H/O hiatal hernia      H/O pulmonary fibrosis      H/O sinus bradycardia      History of fracture of right hip  "unoperable"      S/P hysterectomy      S/P foot surgery      S/P knee surgery      After cataract  bilateral eye cataract surgically removed      Closed right hip fracture  rigth hip ORIF july 19 2016      S/P IVC filter  nov 2016      S/P carpal tunnel release  Right 2008 & 6/27/17      H/O kyphoplasty      Port-A-Cath in place  right chest          MEDICATIONS  (STANDING):  albuterol/ipratropium for Nebulization 3 milliLiter(s) Nebulizer every 6 hours  aMIOdarone    Tablet 100 milliGRAM(s) Oral daily  chlorhexidine 2% Cloths 1 Application(s) Topical <User Schedule>  dextrose 5%. 1000 milliLiter(s) (50 mL/Hr) IV Continuous <Continuous>  dextrose 5%. 1000 milliLiter(s) (100 mL/Hr) IV Continuous <Continuous>  dextrose 50% Injectable 25 Gram(s) IV Push once  dextrose 50% Injectable 12.5 Gram(s) IV Push once  dextrose 50% Injectable 25 Gram(s) IV Push once  droxidopa 100 milliGRAM(s) Oral every 8 hours  fluticasone propionate/ salmeterol 500-50 MICROgram(s) Diskus 1 Dose(s) Inhalation two times a day  gabapentin 400 milliGRAM(s) Oral every 12 hours  glucagon  Injectable 1 milliGRAM(s) IntraMuscular once  hydrocortisone sodium succinate Injectable 50 milliGRAM(s) IV Push daily  insulin lispro (ADMELOG) corrective regimen sliding scale   SubCutaneous three times a day before meals  insulin lispro (ADMELOG) corrective regimen sliding scale   SubCutaneous at bedtime  midodrine 10 milliGRAM(s) Oral every 8 hours  montelukast 10 milliGRAM(s) Oral daily  pantoprazole  Injectable 40 milliGRAM(s) IV Push daily  piperacillin/tazobactam IVPB.. 3.375 Gram(s) IV Intermittent every 8 hours  tiotropium 2.5 MICROgram(s) Inhaler 2 Puff(s) Inhalation daily    MEDICATIONS  (PRN):  acetaminophen     Tablet .. 650 milliGRAM(s) Oral every 6 hours PRN Mild Pain (1 - 3)  dextrose Oral Gel 15 Gram(s) Oral once PRN Blood Glucose LESS THAN 70 milliGRAM(s)/deciliter  heparin   Injectable 5500 Unit(s) IV Push every 6 hours PRN For aPTT less than 40  heparin   Injectable 2500 Unit(s) IV Push every 6 hours PRN For aPTT between 40 - 57    Allergies    No Known Allergies    Intolerances      Vital Signs Last 24 Hrs  T(C): 36.1 (14 Jun 2025 04:52), Max: 37.1 (13 Jun 2025 11:33)  T(F): 97 (14 Jun 2025 04:52), Max: 98.7 (13 Jun 2025 11:33)  HR: 74 (14 Jun 2025 08:06) (68 - 96)  BP: 131/63 (14 Jun 2025 04:52) (115/61 - 131/63)  BP(mean): --  RR: 18 (14 Jun 2025 04:52) (18 - 18)  SpO2: 97% (14 Jun 2025 08:06) (93% - 97%)    Parameters below as of 14 Jun 2025 08:06  Patient On (Oxygen Delivery Method): nasal cannula      I&O's Summary    13 Jun 2025 07:01  -  14 Jun 2025 07:00  --------------------------------------------------------  IN: 144 mL / OUT: 300 mL / NET: -156 mL          TELE: Off cardiac monitor   PHYSICAL EXAM:  Constitutional: NAD, awake and alert  HEENT: Moist Mucous Membranes, Anicteric  Pulmonary: labored, breath sounds coarse throughout lung fields , No wheezing, crackles or rhonchi   Cardiovascular: Regular, S1 and S2, No murmurs, No rubs, gallops or clicks  Gastrointestinal:  soft, nontender, nondistended   Lymph: No peripheral edema. No lymphadenopathy.   Skin: No visible rashes or ulcers, ecchymotic upper extremities   Psych:  Mood & affect appropriate      LABS: All Labs Reviewed:                        7.3    18.77 )-----------( 241      ( 14 Jun 2025 08:13 )             23.0                         8.4    15.96 )-----------( 273      ( 13 Jun 2025 11:40 )             25.7                         8.8    12.65 )-----------( 287      ( 12 Jun 2025 17:14 )             27.2     14 Jun 2025 08:13    140    |  104    |  34     ----------------------------<  114    4.2     |  28     |  1.00   13 Jun 2025 16:10    137    |  106    |  40     ----------------------------<  101    4.5     |  27     |  1.10   13 Jun 2025 07:30    136    |  108    |  42     ----------------------------<  62     6.0     |  23     |  1.20     Ca    8.7        14 Jun 2025 08:13  Ca    9.0        13 Jun 2025 16:10  Ca    8.6        13 Jun 2025 07:30  Phos  2.1       13 Jun 2025 07:30  Phos  2.0       12 Jun 2025 09:20  Mg     1.6       13 Jun 2025 07:30  Mg     1.7       12 Jun 2025 09:20    TPro  5.8    /  Alb  3.1    /  TBili  0.7    /  DBili  x      /  AST  <3     /  ALT  23     /  AlkPhos  37     12 Jun 2025 09:20   LIVER FUNCTIONS - ( 12 Jun 2025 09:20 )  Alb: 3.1 g/dL / Pro: 5.8 g/dL / ALK PHOS: 37 U/L / ALT: 23 U/L / AST: <3 U/L / GGT: x           PTT - ( 13 Jun 2025 07:30 )  PTT:68.8 secTroponin I, High Sensitivity Result: 24.3 ng/L (06-09-25 @ 05:48)  Troponin I, High Sensitivity Result: 24.7 ng/L (06-08-25 @ 19:35)  Troponin I, High Sensitivity Result: 32.8 ng/L (06-08-25 @ 11:20)    12 Lead ECG:   Ventricular Rate 80 BPM    Atrial Rate 80 BPM    P-R Interval 132 ms    QRS Duration 86 ms    Q-T Interval 398 ms    QTC Calculation(Bazett) 459 ms    P Axis 75 degrees    R Axis -35 degrees    T Axis 78 degrees    Diagnosis Line Sinus rhythm with premature supraventricular complexes  Left axis deviation  Possible Lateral infarct , age undetermined  Abnormal ECG  When compared with ECG of 03-JUN-2025 22:27,  premature ventricular complexes are no longer present  Confirmed by ROSALVA GOODRICH ([id number]) on 6/8/2025 9:50:39 PM (06-08-25 @ 10:58)      TRANSTHORACIC ECHOCARDIOGRAM REPORT  ________________________________________________________________________________                                      _______       Pt. Name:       MARTINEZ JONES Study Date:    6/12/2025  MRN:            [id number]        YOB: 1940  Accession #:    [id number]       Age:           84 years  Account#:       [id number]      Gender:        F  Heart Rate:                     Height:        62.99 in (160.00 cm)  Rhythm:                         Weight:        147.71 lb (67.00 kg)  Blood Pressure: 109/63 mmHg     BSA/BMI:       1.70 m² / 26.17 kg/m²  ________________________________________________________________________________________  Referring Physician:    [id number] Naresh Vitale  Interpreting Physician: Daniel Jorge M.D.  Primary Sonographer:    Jamal Hayes    CPT:               ECHO TTE WO CON COMP W DOPP - 64634.m  Indication(s):     Shock, unspecified - R57.9  Procedure:         Transthoracic echocardiogram with 2-D, M-mode and complete                     spectral and color flow Doppler.  Ordering Location: ICU1  Admission Status:  Inpatient  Study Information: Image quality for this study is technically difficult.    _______________________________________________________________________________________     CONCLUSIONS:      1. Technically difficult image quality.   2. Left ventricular systolic function is normal with an ejection fraction of 67 % by Arias's method of disks.   3. Mild left ventricular hypertrophy.   4.Normal right ventricular cavity size and probably normal right ventricular systolic function.   5. Tricuspid aortic valve with normal leaflet excursion. There is calcification of the aortic valve leaflets.   6. Moderate mitral valve leaflet calcification.   7. Mild mitral regurgitation.   8. Moderate tricuspid regurgitation.   9. The inferior vena cava is normal in size measuring 1.64 cm in diameter, (normal <2.1cm) with normal inspiratory collapse (normal >50%) consistent with normal right atrial pressure (~3, range 0-5mmHg).  10. Estimated pulmonary artery systolic pressure is 57 mmHg, consistent with moderate-to-severe pulmonary hypertension.  11. Trace pericardial effusion.  12. Right pleural effusion noted.  13. Compared to the transthoracic echocardiogram performed on 6/2/2025, there have been no significant interval changes.    ________________________________________________________________________________________  FINDINGS:     Left Ventricle:  Left ventricular systolic function is normal with a calculated ejection fraction of 67 % by the Arias's biplane method of disks. Mild left ventricular hypertrophy.     Right Ventricle:  The right ventricular cavity is normal in size and right ventricular systolic function is probablynormal. Tricuspid annular plane systolic excursion (TAPSE) is 1.9 cm (normal >=1.7 cm).     Left Atrium:  The left atrium is normal in size with an indexed volume of 29.06 ml/m².     Right Atrium:  The right atrium is normal in size with an indexed volume of 26.00 ml/m² and an indexed area of 9.41 cm²/m².     Interatrial Septum:  The interatrial septum appears intact.     Aortic Valve:  The aortic valve is tricuspid with normal leaflet excursion. There is calcification of the aortic valve leaflets. There is mild thickening of the aortic valve leaflets. There is trace aortic regurgitation.     Mitral Valve:  Structurally normal mitral valve with normal leaflet excursion. There is calcification of the mitral valve annulus. There is moderate leaflet calcification. There is mild mitral regurgitation.     Tricuspid Valve:  The tricuspid valve is structurally normal with normal leaflet excursion. There is moderate tricuspid regurgitation. Estimated pulmonary artery systolic pressure is 57 mmHg,consistent with moderate-to-severe pulmonary hypertension.     Pulmonic Valve:  The pulmonic valve was not well visualized. There is trace pulmonic regurgitation.     Aorta:  The aortic root at the sinuses of Valsalva is normal in size, measuring 3.30 cm (indexed 1.94 cm/m²). The ascending aorta is normal in size, measuring 3.00 cm (indexed 1.76 cm/m²). The aortic arch diameter is normal in size, measuring 2.4 cm (indexed 1.41 cm/m²).     Pericardium:  There is a trace pericardial effusion.     Pleura:  Right pleural effusion noted.     Systemic Veins:  The inferior vena cava is normal in size measuring 1.64 cm in diameter, (normal <2.1cm) with normal inspiratory collapse (normal >50%) consistent with normal right atrial pressure (~3, range 0-5mmHg).  ____________________________________________________________________  QUANTITATIVE DATA:  Left Ventricle Measurements: (Indexed to BSA)     IVSd (2D):   1.2 cm  LVPWd (2D):  1.1 cm  LVIDd (2D):  4.2 cm  LVIDs (2D):  2.4 cm  LV Mass:     169 g  99.5 g/m²  LV Vol d, MOD A2C: 38.9 ml 22.88 ml/m²  LV Vol d, MOD A4C: 40.9 ml 24.06 ml/m²  LV Vol d, MOD BP:  40.9 ml 24.04 ml/m²  LV Vol s, MOD A2C: 11.3 ml 6.65 ml/m²  LV Vol s, MOD A4C: 13.4 ml 7.88 ml/m²  LV Vol s, MOD BP:  13.4 ml 7.91 ml/m²  LVOT SV MOD BP:    27.4 ml  LV EF% MOD BP:     67 %     MV E Vmax:    1.54 m/s  MV A Vmax:    0.74 m/s  MV E/A:       2.08  e' lateral:   11.90 cm/s  e' medial:    8.81 cm/s  E/e' lateral: 12.94  E/e' medial:  17.48  E/e' Average: 14.87  MV DT:272 msec    Aorta Measurements: (Normal range) (Indexed to BSA)     Ao Root d     3.30 cm (2.7 - 3.3 cm) 1.94 cm/m²  Ao Asc d, 2D: 3.00  Ao Asc prox:  3.00 cm                1.76 cm/m²  Ao Arch:      2.4 cm            Left Atrium Measurements: (Indexed to BSA)  LA Diam 2D: 3.60 cm         Right Ventricle Measurements: Right Atrial Measurements:     TAPSE:            1.9 cm      RA Vol s, MOD A4C         44.2 ml  RV Base (RVID1):  2.9 cm      RA Vol s, MOD A4C i BSA   26.00 ml/m²  RV Mid (RVID2): 2.5 cm      RA Area s, MOD A4C        16.0 cm²  RV Major (RVID3): 6.4 cm      RA Area s, MOD A4C, i BSA 9.41 cm²/m²       LVOT / RVOT/ Qp/Qs Data: (Indexed to BSA)  LVOT Diameter,s: 2.20 cm  LVOT Area:       3.80 cm²    Mitral Valve Measurements:     MV E Vmax: 1.5 m/s         MR Vmax:          3.80 m/s  MV A Vmax: 0.7 m/s         MR Peak Gradient: 57.8 mmHg  MV E/A:    2.1       Tricuspid Valve Measurements:     TR Vmax:          3.7 m/s  TR Peak Gradient: 54.5 mmHg  RA Pressure:      3 mmHg  PASP:             57 mmHg    ________________________________________________________________________________________  Electronically signed on 6/13/2025 at 11:19:51 AM by Daniel Jorge M.D.         *** Final ***

## 2025-06-15 LAB
ALBUMIN SERPL ELPH-MCNC: 2.8 G/DL — LOW (ref 3.3–5)
ALP SERPL-CCNC: 40 U/L — SIGNIFICANT CHANGE UP (ref 40–120)
ALT FLD-CCNC: 27 U/L — SIGNIFICANT CHANGE UP (ref 12–78)
ANION GAP SERPL CALC-SCNC: 5 MMOL/L — SIGNIFICANT CHANGE UP (ref 5–17)
AST SERPL-CCNC: 6 U/L — LOW (ref 15–37)
BASOPHILS # BLD AUTO: 0.01 K/UL — SIGNIFICANT CHANGE UP (ref 0–0.2)
BASOPHILS NFR BLD AUTO: 0.1 % — SIGNIFICANT CHANGE UP (ref 0–2)
BILIRUB SERPL-MCNC: 0.9 MG/DL — SIGNIFICANT CHANGE UP (ref 0.2–1.2)
BUN SERPL-MCNC: 31 MG/DL — HIGH (ref 7–23)
CALCIUM SERPL-MCNC: 8.7 MG/DL — SIGNIFICANT CHANGE UP (ref 8.5–10.1)
CHLORIDE SERPL-SCNC: 106 MMOL/L — SIGNIFICANT CHANGE UP (ref 96–108)
CO2 SERPL-SCNC: 30 MMOL/L — SIGNIFICANT CHANGE UP (ref 22–31)
CREAT SERPL-MCNC: 0.96 MG/DL — SIGNIFICANT CHANGE UP (ref 0.5–1.3)
EGFR: 59 ML/MIN/1.73M2 — LOW
EGFR: 59 ML/MIN/1.73M2 — LOW
EOSINOPHIL # BLD AUTO: 0 K/UL — SIGNIFICANT CHANGE UP (ref 0–0.5)
EOSINOPHIL NFR BLD AUTO: 0 % — SIGNIFICANT CHANGE UP (ref 0–6)
GLUCOSE SERPL-MCNC: 108 MG/DL — HIGH (ref 70–99)
HCT VFR BLD CALC: 26.7 % — LOW (ref 34.5–45)
HGB BLD-MCNC: 9.1 G/DL — LOW (ref 11.5–15.5)
IMM GRANULOCYTES NFR BLD AUTO: 2.1 % — HIGH (ref 0–0.9)
INR BLD: 1.27 RATIO — HIGH (ref 0.85–1.16)
LYMPHOCYTES # BLD AUTO: 0.52 K/UL — LOW (ref 1–3.3)
LYMPHOCYTES # BLD AUTO: 3.6 % — LOW (ref 13–44)
MCHC RBC-ENTMCNC: 30.6 PG — SIGNIFICANT CHANGE UP (ref 27–34)
MCHC RBC-ENTMCNC: 34.1 G/DL — SIGNIFICANT CHANGE UP (ref 32–36)
MCV RBC AUTO: 89.9 FL — SIGNIFICANT CHANGE UP (ref 80–100)
MONOCYTES # BLD AUTO: 3.17 K/UL — HIGH (ref 0–0.9)
MONOCYTES NFR BLD AUTO: 21.8 % — HIGH (ref 2–14)
NEUTROPHILS # BLD AUTO: 10.51 K/UL — HIGH (ref 1.8–7.4)
NEUTROPHILS NFR BLD AUTO: 72.4 % — SIGNIFICANT CHANGE UP (ref 43–77)
NRBC BLD AUTO-RTO: 0 /100 WBCS — SIGNIFICANT CHANGE UP (ref 0–0)
PHOSPHATE SERPL-MCNC: 2.6 MG/DL — SIGNIFICANT CHANGE UP (ref 2.5–4.5)
PLATELET # BLD AUTO: 232 K/UL — SIGNIFICANT CHANGE UP (ref 150–400)
POTASSIUM SERPL-MCNC: 3.8 MMOL/L — SIGNIFICANT CHANGE UP (ref 3.5–5.3)
POTASSIUM SERPL-SCNC: 3.8 MMOL/L — SIGNIFICANT CHANGE UP (ref 3.5–5.3)
PROT SERPL-MCNC: 5.5 G/DL — LOW (ref 6–8.3)
PROTHROM AB SERPL-ACNC: 15 SEC — HIGH (ref 9.9–13.4)
RBC # BLD: 2.97 M/UL — LOW (ref 3.8–5.2)
RBC # FLD: 20 % — HIGH (ref 10.3–14.5)
SODIUM SERPL-SCNC: 141 MMOL/L — SIGNIFICANT CHANGE UP (ref 135–145)
WBC # BLD: 14.51 K/UL — HIGH (ref 3.8–10.5)
WBC # FLD AUTO: 14.51 K/UL — HIGH (ref 3.8–10.5)

## 2025-06-15 PROCEDURE — 99233 SBSQ HOSP IP/OBS HIGH 50: CPT | Mod: GC

## 2025-06-15 PROCEDURE — 99233 SBSQ HOSP IP/OBS HIGH 50: CPT

## 2025-06-15 RX ORDER — SODIUM CHLORIDE 9 G/1000ML
1000 INJECTION, SOLUTION INTRAVENOUS
Refills: 0 | Status: DISCONTINUED | OUTPATIENT
Start: 2025-06-15 | End: 2025-06-16

## 2025-06-15 RX ORDER — PIPERACILLIN-TAZO-DEXTROSE,ISO 3.375G/5
3.38 IV SOLUTION, PIGGYBACK PREMIX FROZEN(ML) INTRAVENOUS EVERY 8 HOURS
Refills: 0 | Status: DISCONTINUED | OUTPATIENT
Start: 2025-06-15 | End: 2025-06-16

## 2025-06-15 RX ADMIN — Medication 25 GRAM(S): at 06:03

## 2025-06-15 RX ADMIN — INSULIN LISPRO 1: 100 INJECTION, SOLUTION INTRAVENOUS; SUBCUTANEOUS at 12:23

## 2025-06-15 RX ADMIN — DROXIDOPA 100 MILLIGRAM(S): 300 CAPSULE ORAL at 21:35

## 2025-06-15 RX ADMIN — Medication 25 GRAM(S): at 01:08

## 2025-06-15 RX ADMIN — TIOTROPIUM BROMIDE INHALATION SPRAY 2 PUFF(S): 3.12 SPRAY, METERED RESPIRATORY (INHALATION) at 06:17

## 2025-06-15 RX ADMIN — MONTELUKAST SODIUM 10 MILLIGRAM(S): 10 TABLET ORAL at 21:36

## 2025-06-15 RX ADMIN — Medication 25 MILLIGRAM(S): at 06:02

## 2025-06-15 RX ADMIN — Medication 1 DOSE(S): at 06:17

## 2025-06-15 RX ADMIN — DROXIDOPA 100 MILLIGRAM(S): 300 CAPSULE ORAL at 06:02

## 2025-06-15 RX ADMIN — GABAPENTIN 400 MILLIGRAM(S): 400 CAPSULE ORAL at 06:02

## 2025-06-15 RX ADMIN — Medication 650 MILLIGRAM(S): at 00:44

## 2025-06-15 RX ADMIN — Medication 25 GRAM(S): at 21:36

## 2025-06-15 RX ADMIN — Medication 40 MILLIGRAM(S): at 06:06

## 2025-06-15 RX ADMIN — IPRATROPIUM BROMIDE AND ALBUTEROL SULFATE 3 MILLILITER(S): .5; 2.5 SOLUTION RESPIRATORY (INHALATION) at 07:25

## 2025-06-15 RX ADMIN — IPRATROPIUM BROMIDE AND ALBUTEROL SULFATE 3 MILLILITER(S): .5; 2.5 SOLUTION RESPIRATORY (INHALATION) at 13:58

## 2025-06-15 RX ADMIN — Medication 650 MILLIGRAM(S): at 19:44

## 2025-06-15 RX ADMIN — Medication 1 APPLICATION(S): at 06:03

## 2025-06-15 RX ADMIN — IPRATROPIUM BROMIDE AND ALBUTEROL SULFATE 3 MILLILITER(S): .5; 2.5 SOLUTION RESPIRATORY (INHALATION) at 00:59

## 2025-06-15 RX ADMIN — GABAPENTIN 400 MILLIGRAM(S): 400 CAPSULE ORAL at 17:36

## 2025-06-15 RX ADMIN — Medication 25 GRAM(S): at 13:35

## 2025-06-15 RX ADMIN — Medication 650 MILLIGRAM(S): at 01:30

## 2025-06-15 RX ADMIN — MIDODRINE HYDROCHLORIDE 10 MILLIGRAM(S): 5 TABLET ORAL at 11:56

## 2025-06-15 RX ADMIN — IPRATROPIUM BROMIDE AND ALBUTEROL SULFATE 3 MILLILITER(S): .5; 2.5 SOLUTION RESPIRATORY (INHALATION) at 20:20

## 2025-06-15 RX ADMIN — AMIODARONE HYDROCHLORIDE 100 MILLIGRAM(S): 50 INJECTION, SOLUTION INTRAVENOUS at 06:02

## 2025-06-15 RX ADMIN — Medication 650 MILLIGRAM(S): at 20:50

## 2025-06-15 RX ADMIN — DROXIDOPA 100 MILLIGRAM(S): 300 CAPSULE ORAL at 11:57

## 2025-06-15 NOTE — PROGRESS NOTE ADULT - SUBJECTIVE AND OBJECTIVE BOX
CHIEF COMPLAINT/ REASON FOR VISIT  .. Patient was seen to address the  issue listed under PROBLEM LIST which is located toward bottom of this note     MARTINEZ PATEL TELE 306 W1    Allergies    No Known Allergies    Intolerances        PAST MEDICAL & SURGICAL HISTORY:  COPD (chronic obstructive pulmonary disease)      DM (diabetes mellitus)  diet-controlled      PAF (paroxysmal atrial fibrillation)  s/p ablation      Hiatal hernia      H/O aplastic anemia      History of IBS      H/O osteoporosis      HLD (hyperlipidemia)      PMR (polymyalgia rheumatica)      History of basal cell cancer      Chronic kidney disease (CKD)      Hypotension      Presence of IVC filter      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      History of immunodeficiency      Lumbar compression fracture      H/O hiatal hernia      H/O pulmonary fibrosis      H/O sinus bradycardia      History of fracture of right hip  "unoperable"      S/P hysterectomy      S/P foot surgery      S/P knee surgery      After cataract  bilateral eye cataract surgically removed      Closed right hip fracture  rigth hip ORIF july 19 2016      S/P IVC filter  nov 2016      S/P carpal tunnel release  Right 2008 & 6/27/17      H/O kyphoplasty      Port-A-Cath in place  right chest          FAMILY HISTORY:  Family history of bladder cancer (Mother)    Family history of myocardial infarction (Father)    FH: atrial fibrillation (Father)        Home Medications:  amiodarone 200 mg oral tablet: 0.5 tab(s) orally once a day (08 Jun 2025 16:43)  Bentyl 10 mg oral capsule: 1 cap(s) orally 2 times a day, As Needed (08 Jun 2025 16:43)  Eliquis 2.5 mg oral tablet: 1 tab(s) orally 2 times a day (08 Jun 2025 16:43)  esomeprazole 20 mg oral delayed release capsule: 1 cap(s) orally once a day (08 Jun 2025 16:43)  fluticasone-salmeterol 500 mcg-50 mcg/inh inhalation powder: 1 puff(s) inhaled 2 times a day (08 Jun 2025 16:43)  folic acid 1 mg oral tablet: 1 tab(s) orally once a day (in the morning) (08 Jun 2025 16:43)  gabapentin 400 mg oral capsule: 1 cap(s) orally 2 times a day (08 Jun 2025 16:43)  ipratropium-albuterol 0.5 mg-2.5 mg/3 mL inhalation solution: 3 milliliter(s) inhaled every 6 hours As needed Shortness of Breath and/or Wheezing (08 Jun 2025 16:43)  IVIG monthly:  (08 Jun 2025 16:43)  leflunomide 10 mg oral tablet: 1 tab(s) orally once a day (08 Jun 2025 16:43)  midodrine 10 mg oral tablet: 1 tab(s) orally every 8 hours (08 Jun 2025 16:43)  montelukast 10 mg oral tablet: 1 tab(s) orally once a day (08 Jun 2025 16:43)  pantoprazole 40 mg oral delayed release tablet: 1 tab(s) orally once a day (before a meal) (08 Jun 2025 16:43)  predniSONE 5 mg oral tablet: 1 tab(s) orally once a day (08 Jun 2025 16:43)  Procrit 10,000 units/mL preservative-free injectable solution: injectable once a week WEDNESDAY (08 Jun 2025 16:43)  Reclast Infusion , IV x 1 yearly:  (08 Jun 2025 16:43)  simvastatin 10 mg oral tablet: 1 tab(s) orally once a day (at bedtime) (08 Jun 2025 16:43)  tiotropium 2.5 mcg/inh inhalation aerosol: 2 puff(s) inhaled once a day (08 Jun 2025 16:43)  tiZANidine: 2 tab(s) orally once a day (at bedtime) as needed for  muscle spasm (08 Jun 2025 16:43)  torsemide 20 mg oral tablet: 1 tab(s) orally once a day (in the morning) (08 Jun 2025 16:49)      MEDICATIONS  (STANDING):  albuterol/ipratropium for Nebulization 3 milliLiter(s) Nebulizer every 6 hours  aMIOdarone    Tablet 100 milliGRAM(s) Oral daily  chlorhexidine 2% Cloths 1 Application(s) Topical <User Schedule>  dextrose 5%. 1000 milliLiter(s) (50 mL/Hr) IV Continuous <Continuous>  dextrose 5%. 1000 milliLiter(s) (100 mL/Hr) IV Continuous <Continuous>  dextrose 50% Injectable 25 Gram(s) IV Push once  dextrose 50% Injectable 12.5 Gram(s) IV Push once  dextrose 50% Injectable 25 Gram(s) IV Push once  droxidopa 100 milliGRAM(s) Oral every 8 hours  fluticasone propionate/ salmeterol 500-50 MICROgram(s) Diskus 1 Dose(s) Inhalation two times a day  gabapentin 400 milliGRAM(s) Oral every 12 hours  glucagon  Injectable 1 milliGRAM(s) IntraMuscular once  hydrocortisone sodium succinate Injectable 25 milliGRAM(s) IV Push daily  insulin lispro (ADMELOG) corrective regimen sliding scale   SubCutaneous three times a day before meals  insulin lispro (ADMELOG) corrective regimen sliding scale   SubCutaneous at bedtime  midodrine 10 milliGRAM(s) Oral every 8 hours  montelukast 10 milliGRAM(s) Oral daily  pantoprazole  Injectable 40 milliGRAM(s) IV Push daily  tiotropium 2.5 MICROgram(s) Inhaler 2 Puff(s) Inhalation daily    MEDICATIONS  (PRN):  acetaminophen     Tablet .. 650 milliGRAM(s) Oral every 6 hours PRN Mild Pain (1 - 3)  dextrose Oral Gel 15 Gram(s) Oral once PRN Blood Glucose LESS THAN 70 milliGRAM(s)/deciliter      Diet, Regular:   DASH/TLC Sodium & Cholesterol Restricted (06-08-25 @ 15:56) [Active]          Vital Signs Last 24 Hrs  T(C): 36.9 (15 Nick 2025 04:26), Max: 37.2 (14 Jun 2025 20:12)  T(F): 98.4 (15 Nick 2025 04:26), Max: 99 (14 Jun 2025 20:12)  HR: 81 (15 Nick 2025 04:26) (74 - 95)  BP: 128/72 (15 Nick 2025 04:26) (124/77 - 134/71)  BP(mean): --  RR: 18 (15 Nick 2025 04:26) (16 - 18)  SpO2: 94% (15 Nick 2025 04:26) (91% - 98%)    Parameters below as of 15 Nick 2025 04:26  Patient On (Oxygen Delivery Method): nasal cannula  O2 Flow (L/min): 2        06-14-25 @ 07:01  -  06-15-25 @ 07:00  --------------------------------------------------------  IN: 0 mL / OUT: 601 mL / NET: -601 mL              LABS:                        7.3    18.77 )-----------( 241      ( 14 Jun 2025 08:13 )             23.0     06-14    140  |  104  |  34[H]  ----------------------------<  114[H]  4.2   |  28  |  1.00    Ca    8.7      14 Jun 2025 08:13        Urinalysis Basic - ( 14 Jun 2025 08:13 )    Color: x / Appearance: x / SG: x / pH: x  Gluc: 114 mg/dL / Ketone: x  / Bili: x / Urobili: x   Blood: x / Protein: x / Nitrite: x   Leuk Esterase: x / RBC: x / WBC x   Sq Epi: x / Non Sq Epi: x / Bacteria: x            WBC:  WBC Count: 18.77 K/uL (06-14 @ 08:13)  WBC Count: 15.96 K/uL (06-13 @ 11:40)  WBC Count: 12.65 K/uL (06-12 @ 17:14)  WBC Count: 8.82 K/uL (06-12 @ 09:20)      MICROBIOLOGY:  RECENT CULTURES:  06-08 Sputum Sputum XXXX   Few Squamous epithelial cells per low power field  Few polymorphonuclear leukocytes per low power field  Few Gram Positive Cocci in Pairs and Chains per oil power field   Commensal ginger consistent with body site    06-08 Clean Catch XXXX XXXX   <10,000 CFU/mL Normal Urogenital Ginger    06-08 Blood Blood-Peripheral XXXX XXXX   No growth at 5 days    06-08 Blood Blood-Peripheral XXXX XXXX   No growth at 5 days                    Sodium:  Sodium: 140 mmol/L (06-14 @ 08:13)  Sodium: 137 mmol/L (06-13 @ 16:10)  Sodium: 136 mmol/L (06-13 @ 07:30)  Sodium: 139 mmol/L (06-12 @ 09:20)      1.00 mg/dL 06-14 @ 08:13  1.10 mg/dL 06-13 @ 16:10  1.20 mg/dL 06-13 @ 07:30  1.30 mg/dL 06-12 @ 09:20      Hemoglobin:  Hemoglobin: 7.3 g/dL (06-14 @ 08:13)  Hemoglobin: 8.4 g/dL (06-13 @ 11:40)  Hemoglobin: 8.8 g/dL (06-12 @ 17:14)  Hemoglobin: 8.8 g/dL (06-12 @ 09:20)      Platelets: Platelet Count - Automated: 241 K/uL (06-14 @ 08:13)  Platelet Count - Automated: 273 K/uL (06-13 @ 11:40)  Platelet Count - Automated: 287 K/uL (06-12 @ 17:14)  Platelet Count - Automated: 271 K/uL (06-12 @ 09:20)          Urinalysis Basic - ( 14 Jun 2025 08:13 )    Color: x / Appearance: x / SG: x / pH: x  Gluc: 114 mg/dL / Ketone: x  / Bili: x / Urobili: x   Blood: x / Protein: x / Nitrite: x   Leuk Esterase: x / RBC: x / WBC x   Sq Epi: x / Non Sq Epi: x / Bacteria: x        RADIOLOGY & ADDITIONAL STUDIES:      MICROBIOLOGY:  RECENT CULTURES:  06-08 Sputum Sputum XXXX   Few Squamous epithelial cells per low power field  Few polymorphonuclear leukocytes per low power field  Few Gram Positive Cocci in Pairs and Chains per oil power field   Commensal ginger consistent with body site    06-08 Clean Catch XXXX XXXX   <10,000 CFU/mL Normal Urogenital Ginger    06-08 Blood Blood-Peripheral XXXX XXXX   No growth at 5 days    06-08 Blood Blood-Peripheral XXXX XXXX   No growth at 5 days

## 2025-06-15 NOTE — PROGRESS NOTE ADULT - SUBJECTIVE AND OBJECTIVE BOX
CHIEF COMPLAINT/ REASON FOR VISIT  .. Patient was seen to address the  issue listed under PROBLEM LIST which is located toward bottom of this note     [facility]ARTINE[facility] PATEL TELE 306 W1    Allergies    No Known Allergies    Intolerances        PAST MEDICAL & SURGICAL HISTORY:  COPD (chronic obstructive pulmonary disease)      DM (diabetes mellitus)  diet-controlled      PAF (paroxysmal atrial fibrillation)  s/p ablation      Hiatal hernia      H/O aplastic anemia      History of IBS      H/O osteoporosis      HLD (hyperlipidemia)      PMR (polymyalgia rheumatica)      History of basal cell cancer      Chronic kidney disease (CKD)      Hypotension      Presence of IVC filter      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      History of immunodeficiency      Lumbar compression fracture      H/O hiatal hernia      H/O pulmonary fibrosis      H/O sinus bradycardia      History of fracture of right hip  "unoperable"      S/P hysterectomy      S/P foot surgery      S/P knee surgery      After cataract  bilateral eye cataract surgically removed      Closed right hip fracture  rigth hip ORIF july 19 2016      S/P IVC filter  nov 2016      S/P carpal tunnel release  Right 2008 & 6/27/17      H/O kyphoplasty      Port-A-Cath in place  right chest          FAMILY HISTORY:  Family history of bladder cancer (Mother)    Family history of myocardial infarction (Father)    FH: atrial fibrillation (Father)        Home Medications:  amiodarone 200 mg oral tablet: 0.5 tab(s) orally once a day (08 Jun 2025 16:43)  Bentyl 10 mg oral capsule: 1 cap(s) orally 2 times a day, As Needed (08 Jun 2025 16:43)  Eliquis 2.5 mg oral tablet: 1 tab(s) orally 2 times a day (08 Jun 2025 16:43)  esomeprazole 20 mg oral delayed release capsule: 1 cap(s) orally once a day (08 Jun 2025 16:43)  fluticasone-salmeterol 500 mcg-50 mcg/inh inhalation powder: 1 puff(s) inhaled 2 times a day (08 Jun 2025 16:43)  folic acid 1 mg oral tablet: 1 tab(s) orally once a day (in the morning) (08 Jun 2025 16:43)  gabapentin 400 mg oral capsule: 1 cap(s) orally 2 times a day (08 Jun 2025 16:43)  ipratropium-albuterol 0.5 mg-2.5 mg/3 mL inhalation solution: 3 milliliter(s) inhaled every 6 hours As needed Shortness of Breath and/or Wheezing (08 Jun 2025 16:43)  IVIG monthly:  (08 Jun 2025 16:43)  leflunomide 10 mg oral tablet: 1 tab(s) orally once a day (08 Jun 2025 16:43)  midodrine 10 mg oral tablet: 1 tab(s) orally every 8 hours (08 Jun 2025 16:43)  montelukast 10 mg oral tablet: 1 tab(s) orally once a day (08 Jun 2025 16:43)  pantoprazole 40 mg oral delayed release tablet: 1 tab(s) orally once a day (before a meal) (08 Jun 2025 16:43)  predniSONE 5 mg oral tablet: 1 tab(s) orally once a day (08 Jun 2025 16:43)  Procrit 10,000 units/mL preservative-free injectable solution: injectable once a week WEDNESDAY (08 Jun 2025 16:43)  Reclast Infusion , IV x 1 yearly:  (08 Jun 2025 16:43)  simvastatin 10 mg oral tablet: 1 tab(s) orally once a day (at bedtime) (08 Jun 2025 16:43)  tiotropium 2.5 mcg/inh inhalation aerosol: 2 puff(s) inhaled once a day (08 Jun 2025 16:43)  tiZANidine: 2 tab(s) orally once a day (at bedtime) as needed for  muscle spasm (08 Jun 2025 16:43)  torsemide 20 mg oral tablet: 1 tab(s) orally once a day (in the morning) (08 Jun 2025 16:49)      MEDICATIONS  (STANDING):  albuterol/ipratropium for Nebulization 3 milliLiter(s) Nebulizer every 6 hours  aMIOdarone    Tablet 100 milliGRAM(s) Oral daily  chlorhexidine 2% Cloths 1 Application(s) Topical <User Schedule>  dextrose 5%. 1000 milliLiter(s) (50 mL/Hr) IV Continuous <Continuous>  dextrose 5%. 1000 milliLiter(s) (100 mL/Hr) IV Continuous <Continuous>  dextrose 50% Injectable 25 Gram(s) IV Push once  dextrose 50% Injectable 12.5 Gram(s) IV Push once  dextrose 50% Injectable 25 Gram(s) IV Push once  droxidopa 100 milliGRAM(s) Oral every 8 hours  fluticasone propionate/ salmeterol 500-50 MICROgram(s) Diskus 1 Dose(s) Inhalation two times a day  gabapentin 400 milliGRAM(s) Oral every 12 hours  glucagon  Injectable 1 milliGRAM(s) IntraMuscular once  hydrocortisone sodium succinate Injectable 25 milliGRAM(s) IV Push daily  insulin lispro (ADMELOG) corrective regimen sliding scale   SubCutaneous three times a day before meals  insulin lispro (ADMELOG) corrective regimen sliding scale   SubCutaneous at bedtime  midodrine 10 milliGRAM(s) Oral every 8 hours  montelukast 10 milliGRAM(s) Oral daily  pantoprazole  Injectable 40 milliGRAM(s) IV Push daily  tiotropium 2.5 MICROgram(s) Inhaler 2 Puff(s) Inhalation daily    MEDICATIONS  (PRN):  acetaminophen     Tablet .. 650 milliGRAM(s) Oral every 6 hours PRN Mild Pain (1 - 3)  dextrose Oral Gel 15 Gram(s) Oral once PRN Blood Glucose LESS THAN 70 milliGRAM(s)/deciliter      Diet, Regular:   DASH/TLC Sodium & Cholesterol Restricted (06-08-25 @ 15:56) [Active]          Vital Signs Last 24 Hrs  T(C): 36.9 (15 Nick 2025 04:26), Max: 37.2 (14 Jun 2025 20:12)  T(F): 98.4 (15 Nick 2025 04:26), Max: 99 (14 Jun 2025 20:12)  HR: 81 (15 Nick 2025 04:26) (74 - 95)  BP: 128/72 (15 Nick 2025 04:26) (124/77 - 134/71)  BP(mean): --  RR: 18 (15 Nick 2025 04:26) (16 - 18)  SpO2: 94% (15 Nick 2025 04:26) (91% - 98%)    Parameters below as of 15 Nick 2025 04:26  Patient On (Oxygen Delivery Method): nasal cannula  O2 Flow (L/min): 2        06-14-25 @ 07:01  -  06-15-25 @ 07:00  --------------------------------------------------------  IN: 0 mL / OUT: 601 mL / NET: -601 mL              LABS:                        7.3    18.77 )-----------( 241      ( 14 Jun 2025 08:13 )             23.0     06-14    140  |  104  |  34[H]  ----------------------------<  114[H]  4.2   |  28  |  1.00    Ca    8.7      14 Jun 2025 08:13  Phos  2.1     06-13  Mg     1.6     06-13      PTT - ( 13 Jun 2025 07:30 )  PTT:68.8 sec  Urinalysis Basic - ( 14 Jun 2025 08:13 )    Color: x / Appearance: x / SG: x / pH: x  Gluc: 114 mg/dL / Ketone: x  / Bili: x / Urobili: x   Blood: x / Protein: x / Nitrite: x   Leuk Esterase: x / RBC: x / WBC x   Sq Epi: x / Non Sq Epi: x / Bacteria: x            WBC:  WBC Count: 18.77 K/uL (06-14 @ 08:13)  WBC Count: 15.96 K/uL (06-13 @ 11:40)  WBC Count: 12.65 K/uL (06-12 @ 17:14)  WBC Count: 8.82 K/uL (06-12 @ 09:20)      MICROBIOLOGY:  RECENT CULTURES:  06-08 Sputum Sputum XXXX   Few Squamous epithelial cells per low power field  Few polymorphonuclear leukocytes per low power field  Few Gram Positive Cocci in Pairs and Chains per oil power field   Commensal ginger consistent with body site    06-08 Clean Catch XXXX XXXX   <10,000 CFU/mL Normal Urogenital Ginger    06-08 Blood Blood-Peripheral XXXX XXXX   No growth at 5 days    06-08 Blood Blood-Peripheral XXXX XXXX   No growth at 5 days                PTT - ( 13 Jun 2025 07:30 )  PTT:68.8 sec    Sodium:  Sodium: 140 mmol/L (06-14 @ 08:13)  Sodium: 137 mmol/L (06-13 @ 16:10)  Sodium: 136 mmol/L (06-13 @ 07:30)  Sodium: 139 mmol/L (06-12 @ 09:20)      1.00 mg/dL 06-14 @ 08:13  1.10 mg/dL 06-13 @ 16:10  1.20 mg/dL 06-13 @ 07:30  1.30 mg/dL 06-12 @ 09:20      Hemoglobin:  Hemoglobin: 7.3 g/dL (06-14 @ 08:13)  Hemoglobin: 8.4 g/dL (06-13 @ 11:40)  Hemoglobin: 8.8 g/dL (06-12 @ 17:14)  Hemoglobin: 8.8 g/dL (06-12 @ 09:20)      Platelets: Platelet Count - Automated: 241 K/uL (06-14 @ 08:13)  Platelet Count - Automated: 273 K/uL (06-13 @ 11:40)  Platelet Count - Automated: 287 K/uL (06-12 @ 17:14)  Platelet Count - Automated: 271 K/uL (06-12 @ 09:20)          Urinalysis Basic - ( 14 Jun 2025 08:13 )    Color: x / Appearance: x / SG: x / pH: x  Gluc: 114 mg/dL / Ketone: x  / Bili: x / Urobili: x   Blood: x / Protein: x / Nitrite: x   Leuk Esterase: x / RBC: x / WBC x   Sq Epi: x / Non Sq Epi: x / Bacteria: x        RADIOLOGY & ADDITIONAL STUDIES:      MICROBIOLOGY:  RECENT CULTURES:  06-08 Sputum Sputum XXXX   Few Squamous epithelial cells per low power field  Few polymorphonuclear leukocytes per low power field  Few Gram Positive Cocci in Pairs and Chains per oil power field   Commensal ginger consistent with body site    06-08 Clean Catch XXXX XXXX   <10,000 CFU/mL Normal Urogenital Ginger    06-08 Blood Blood-Peripheral XXXX XXXX   No growth at 5 days    06-08 Blood Blood-Peripheral XXXX XXXX   No growth at 5 days

## 2025-06-15 NOTE — PROGRESS NOTE ADULT - SUBJECTIVE AND OBJECTIVE BOX
Patient is a 84y old  Female who presents with a chief complaint of septic shock, PNA (15 Nick 2025 07:30)      INTERVAL HPI/OVERNIGHT EVENTS: No acute overnight events. Pt was seen and examined at bedside. Pt states that she feels ok, still coughing up blood.  Pt denies headache, dizziness, lightheadedness, fever, chills, body aches, CP, SOB, palpitations, abdominal pain, n/v, numbness/tingling.  No other complaints at this time.     MEDICATIONS  (STANDING):  albuterol/ipratropium for Nebulization 3 milliLiter(s) Nebulizer every 6 hours  aMIOdarone    Tablet 100 milliGRAM(s) Oral daily  chlorhexidine 2% Cloths 1 Application(s) Topical <User Schedule>  dextrose 5%. 1000 milliLiter(s) (50 mL/Hr) IV Continuous <Continuous>  dextrose 5%. 1000 milliLiter(s) (100 mL/Hr) IV Continuous <Continuous>  dextrose 50% Injectable 25 Gram(s) IV Push once  dextrose 50% Injectable 12.5 Gram(s) IV Push once  dextrose 50% Injectable 25 Gram(s) IV Push once  droxidopa 100 milliGRAM(s) Oral every 8 hours  fluticasone propionate/ salmeterol 500-50 MICROgram(s) Diskus 1 Dose(s) Inhalation two times a day  gabapentin 400 milliGRAM(s) Oral every 12 hours  glucagon  Injectable 1 milliGRAM(s) IntraMuscular once  hydrocortisone sodium succinate Injectable 25 milliGRAM(s) IV Push daily  insulin lispro (ADMELOG) corrective regimen sliding scale   SubCutaneous three times a day before meals  insulin lispro (ADMELOG) corrective regimen sliding scale   SubCutaneous at bedtime  midodrine 10 milliGRAM(s) Oral every 8 hours  montelukast 10 milliGRAM(s) Oral daily  pantoprazole  Injectable 40 milliGRAM(s) IV Push daily  tiotropium 2.5 MICROgram(s) Inhaler 2 Puff(s) Inhalation daily    MEDICATIONS  (PRN):  acetaminophen     Tablet .. 650 milliGRAM(s) Oral every 6 hours PRN Mild Pain (1 - 3)  dextrose Oral Gel 15 Gram(s) Oral once PRN Blood Glucose LESS THAN 70 milliGRAM(s)/deciliter      Allergies    No Known Allergies    Intolerances        REVIEW OF SYSTEMS:  CONSTITUTIONAL: No fever or chills  HEENT:  No headache, no sore throat  RESPIRATORY: No cough, wheezing, +sob +hemoptysis  CARDIOVASCULAR: No chest pain, palpitations  GASTROINTESTINAL: No abd pain, nausea, vomiting, or diarrhea  GENITOURINARY: No dysuria, frequency, or hematuria  NEUROLOGICAL: no focal weakness or dizziness  MUSCULOSKELETAL: no myalgias     Vital Signs Last 24 Hrs  T(C): 36.9 (15 Nick 2025 04:26), Max: 37.2 (14 Jun 2025 20:12)  T(F): 98.4 (15 Nick 2025 04:26), Max: 99 (14 Jun 2025 20:12)  HR: 81 (15 Nick 2025 07:26) (78 - 95)  BP: 128/72 (15 Nick 2025 04:26) (124/77 - 134/71)  BP(mean): --  RR: 18 (15 Nick 2025 04:26) (16 - 18)  SpO2: 94% (15 Nick 2025 07:26) (91% - 98%)    Parameters below as of 15 Nick 2025 07:26  Patient On (Oxygen Delivery Method): nasal cannula, 2 LPM        PHYSICAL EXAM:  GENERAL: NAD, multiple blood tinged tissues at bedside from hemoptysis  HEENT:  anicteric, moist mucous membranes  CHEST/LUNG:  diminished b/l  HEART:  RRR, S1, S2  ABDOMEN:  BS+, soft, nontender, nondistended  EXTREMITIES: no edema, cyanosis, or calf tenderness  NERVOUS SYSTEM: answers questions and follows commands appropriately    LABS:                        9.1    14.51 )-----------( 232      ( 15 Nick 2025 06:50 )             26.7     CBC Full  -  ( 15 Nick 2025 06:50 )  WBC Count : 14.51 K/uL  Hemoglobin : 9.1 g/dL  Hematocrit : 26.7 %  Platelet Count - Automated : 232 K/uL  Mean Cell Volume : 89.9 fl  Mean Cell Hemoglobin : 30.6 pg  Mean Cell Hemoglobin Concentration : 34.1 g/dL  Auto Neutrophil # : x  Auto Lymphocyte # : x  Auto Monocyte # : x  Auto Eosinophil # : x  Auto Basophil # : x  Auto Neutrophil % : x  Auto Lymphocyte % : x  Auto Monocyte % : x  Auto Eosinophil % : x  Auto Basophil % : x      Ca    8.7        14 Jun 2025 08:13      PT/INR - ( 15 Nick 2025 06:50 )   PT: 15.0 sec;   INR: 1.27 ratio           Urinalysis Basic - ( 14 Jun 2025 08:13 )    Color: x / Appearance: x / SG: x / pH: x  Gluc: 114 mg/dL / Ketone: x  / Bili: x / Urobili: x   Blood: x / Protein: x / Nitrite: x   Leuk Esterase: x / RBC: x / WBC x   Sq Epi: x / Non Sq Epi: x / Bacteria: x      CAPILLARY BLOOD GLUCOSE      POCT Blood Glucose.: 156 mg/dL (14 Jun 2025 21:37)  POCT Blood Glucose.: 107 mg/dL (14 Jun 2025 16:43)  POCT Blood Glucose.: 238 mg/dL (14 Jun 2025 11:56)        Culture - Sputum (collected 06-08-25 @ 18:25)  Source: Sputum Sputum  Gram Stain (06-08-25 @ 22:12):    Few Squamous epithelial cells per low power field    Few polymorphonuclear leukocytes per low power field    Few Gram Positive Cocci in Pairs and Chains per oil power field  Final Report (06-10-25 @ 15:22):    Commensal ginger consistent with body site    Urinalysis with Rflx Culture (collected 06-08-25 @ 15:44)    Culture - Urine (collected 06-08-25 @ 15:44)  Source: Clean Catch  Final Report (06-09-25 @ 16:05):    <10,000 CFU/mL Normal Urogenital Ginger    Culture - Blood (collected 06-08-25 @ 11:20)  Source: Blood Blood-Peripheral  Final Report (06-13-25 @ 14:00):    No growth at 5 days    Culture - Blood (collected 06-08-25 @ 11:15)  Source: Blood Blood-Peripheral  Final Report (06-13-25 @ 14:00):    No growth at 5 days        RADIOLOGY & ADDITIONAL TESTS: ____    Personally reviewed.     Consultant(s) Notes Reviewed:  [x] YES  [ ] NO    e

## 2025-06-15 NOTE — PROGRESS NOTE ADULT - PROBLEM SELECTOR PLAN 3
- The patient is noted to have blood-tinged sputum  - requiring PRBC transfusions ~8 weeks (via Rt subclavian mediport)  - Hgb 7.6 (baseline appears to be ~8)   - transfuse <7-7.5  - Will hold Eliquis until clinical improvement is noted  - 6/12 heparin gtt started  - 6/13 : heparin gtt now discontinued 2/2 hemoptysis  - Will give 2 unit PRBC- will give lasix 40mg IVP x1 in between units - The patient is noted to have blood-tinged sputum  - requiring PRBC transfusions ~8 weeks (via Rt subclavian mediport)  - Hgb 7.6 (baseline appears to be ~8)   - transfuse <7-7.5  - Will hold Eliquis until clinical improvement is noted  - 6/12 heparin gtt started  - 6/13 : heparin gtt now discontinued 2/2 hemoptysis  - Will give 2 unit PRBC- will give lasix 40mg IVP x1 in between units -- good response

## 2025-06-15 NOTE — PROGRESS NOTE ADULT - ASSESSMENT
REASON FOR VISIT  .. Management of problems listed below      EVENTS/CURRENT ISSUES.  . 6/14/2025 iv ufh dced   . 6/13/2025 continues to have mild hemoptysis but is also on iv ufh drip   . 6/13/2025 dw pt re rbaa of bronch and se agrees scheduled for 6/16 10 in or   . 6/13/2025 dw cardio and id   . 6/11/2025 qft funfitell and galactomannan orderd   . 6/11/2025 pt wa sstarted on iv ufh for hemoptysis and pleuritic chest pain but was stopped when vq showed vlp   . 6/10/2025 Mild hemoptysis   . 6/9 tr out of icu  . 6/8/2025  . PNEUMONIA RML 6/8/2025  . SHOCK 6/8/2025   . NOREPI 6/8/2025   . STRESS STEROIDS   .... HYDROCORTISONE 6/8/2025  . A fib (chronic) POA 6/8/2025  . ANEMIA Hb 6/8/2025 Hb 7.5  . KIMBERLY ON CKD Cr 6/8/2025 Cr 1.9        REVIEW OF SYMPTOMS   Able to give ROS  Yes     RELIABILITY +/-   CONSTITUTIONAL Weakness Yes    ENDOCRINE  No heat or cold intolerance    ALLERGY No hives  Sore throat No stridor  RESP Shortness of breath YES   NEURO New weakness No   CARDIAC   Palpitations No         PHYSICAL EXAM    HEENT Unremarkable  atraumatic   RESP Fair air entry  Harsh breath sound   CARDIAC S1 S2 No S3     NO JVD    ABDOMEN No hepatosplenomegaly   PEDAL EDEMA present No calf tenderness    REASON FOR VISIT  .. Management of problems listed below      CC.   . 6/8/2025 brought in by ems for right sided chest pain that started at 3am- nonradiating- patient was offered aspirin by ems- patient refused and stated to ems that she is on plavix and was advised not to take aspirin. patient on 43 litres nasl cannula-     OVERALL PRESENTATION.  . 6/8/2025 84 yr old female with PMHx afib on eliquis, COPD (not on home O2), PE/Rt lower extremity DVT 2017, Rt LE IVC filter 2017 CKD3, aplastic anemia, polymyalgia rheumatica on prednisone 5 mg po qd, requiring PRBC transfusions ~8 weeks (via Rt subclavian mediport), AVN bilat hips, HTN, HLD, HFpEF (75% 6.2.25), diastolic dysfx grade1, recent hospitalization 5/25/25-6/5/25 for ADHF/COPD exacerbation, Pt with eventual improvement and transfer to [facility] facility from where she presents to [facility]. this a.m. 6/8/25 with c/o Rt sided chest pain. general malaise. Pt stated began to feel ill evening before presentation, daughter this a.m. endorsed pt lethargic, pale.     In E.D. Pt found to have fever with tmax of 101, KIMBERLY on CKD with sCr 1.90 (baseline 1.2-1.6), HYPOtnsive with SBP 80's (pt on midodrine therapy, usual 's). In addition found to have leukocytosis of 37.6 (baseline 5.25 6/5/25), procalcitonin of 13.6, Hgb 7.5, elevated INR 2.27,  Chest xray demonstrated RML consolidations, concerning for pneum. In E.D. pt receiving 500 cc's NS, Vanco 1 gm, zosyn. Pt received tylenol 1 gm IV x1.     PMH.        BEST PRACTICE ISSUES.  . HOB ELEVATN.    .... Yes  . DIET  .   .... DASH 6/8/2025   . FREE WATER.   ....   .  IV FLUID.  .... 6/15/2025 1/2 ns 50   ..... 6/8 lr 40 x 12 h   . PHARMAC DVT PPLX .    .... 6/12-6/14/2025 iv ufh (hospitalist)  .... 6/11/2025 [facility] 5k.2   .... 6/10/2025 4:46 PM iv ufh   .... [facility] 6/9-6/10/2025   . NON PHARMAC DVT PPLX .      . STRESS ULCR PPLX .   ....   . DATE/DM MGMT.   ..... See under Endocrine section   GENERAL DATA .   . COVID.         .... scv2 6/8/2025 scv2 (-)   . GOC.    ....    . ICU STAY.    .... 6/8-6/9   . INFECTION PPLX .   .... CHLORHEXIDINE 2% 6/8/2025   . ALLGY.   ....  nka  . WT.   .... 6/8/2025 69   . BMI.  ....6/8/2025 26    [id number]  VITALS/GAS EXCHANGE/DRIPS    ABGS.     .  VS/ PO/IO/ VENT/ DRIPS.  6/15/2025 afeb 81 120/70   6/15/2025 2l 94%    [id number]  PROBLEM ASSESSMENT RECOMMENDATIONS.  RESP.   . GAS EXCHANGE .   .... target PO 90-95%     . COPD   .... ADVAIR 500 6/8/2025   .... MONTELUKAST 10 6/8/2025   .... SPIRIVA 6/8/2025     . RESP FAILURE  .... 6/8/2025 Has ho hypercapnia   .... 6/8/2025 recommend monitor abg    . MILD HEMOPTYSIS 6/10/2025   .... ct ch 6/10/2025 cw 5/31  ........ new mf infection in rul   ........ unchanged small r greater than left pl effsn   ....... enlarged pulm artery suggesting pulm hytn   .... 6/10/2025  dw hemat cardio id and instead of restarting apixa will start iv UFH which can be easily reversed in case Hb starts dropping but will be the rx for venous thromboembolism as well as for a fib   .... v duplx 6/10 (-)  .... vq 6/10 vlp   . 6/13/2025 continues to have mild hemoptysis but is also on iv ufh drip   . 6/13/2025 dw pt re rbaa of bronch and se agrees scheduled for 6/16 10 in or   . 6/13/2025 dw cardio and id     . PLEURITIC CHEST PAIN RO VTE   .... v duplx 6/10 (-)  .... vq 6/10 vlp   .... 6/12/2025 iv ufh was stopped 6/11 as vq vlp but was restarted by hospitalist 6/12/2025 for af       INFECTION.  . DATA  .... w 6/8-6/10-6/13-6/14-6/15/2025   .... w 13.6 - 9.2 - 15.9 - 18- 14   .... esr 6/8/2025 esr 17   .... w 6/8-6/9/2025 w 32 - 24   .... ua 6/8/2025 w 4   .... cxr 6/8/2025 cxr dense infiltra r upper hilum r mediport  .... ct ch 6/10 cw 5/31  ........ r greater than l effs   ........ partial rll atelectasis   ........ new patchy and nodular areas of consolidation rul and associated ground glass opacities   ........ ground glass and nodular opac also scott   ........ numerous tib rml and rll     . MICROBIO  .... sp 6/8 n commensals   .... flu ab 6/8/2025 (-)   .... rsv 6/8/2025 (-)    .... mrsa 6/9/2025 (-)  .... uc 6/8 (-)  .... bc 6/8 (-)     . PNEUMONIA RUL 6/8/2025   .... AZITHRO 6/8-6/11 /2025   .... ZOSYN 6/8-6/15/2025     CARDIAC.  . PAIN CHEST   .... 6/10/2025 co pain chest r   .... 6/10/2025 check ct to see if she has pleurisy   .... 6/10/2025  dw hemat cardio id and instead of restarting apixa will start iv UFH which can be easily reversed in case Hb starts dropping but will be the rx for venous thromboembolism as well as for a fib     . STRESS STEROIDS   .... HYDROCORTISONE   ........ 6/8/2025 h 50.4  ........ 6/10/2025 h 50.2   ........ 6/12/2025 hydrocort 50   ....... 6/14/2025 h 25    . SHOCK   .... MIDODRINE 6/8/2025 m 10.3   .... DROXIDOPA 6/9/2025 d 100.3   .... NOREPI 6/8-6/9/2025 n 8/250   .... target map 65 (+)     . CAD    .... Trop 6/8-6/9/2025 Tr 32- 24     . LACTICEMIA   .... la 6/8/2025 la 1.4     . CHF  .... pbnp 6/8/2025 pbnp 6599   .... tte 4/2025 mild ph  n vf  .... tte 6/2/2025   ........ ef 71%   ........ n rvsf    ........ pasp 54   ....... la mod dilatd   .... lasix 40 once 6/14/2025    . A fib (chronic) POA 6/8/2025  .... AMIODARONE 100 6/8/2025  .... 6/10/2025  dw hemat cardio id and instead of restarting apixa will start iv UFH which can be easily reversed in case Hb starts dropping but will be the rx for venous thromboembolism as well as for a fib   .... 6/12-6/14/2025 iv ufh     HEMAT.  . DATA  .... Plt 6/8/2025 plt 153   .... inr 6/8-6/9/2025 inr 2.27- 1.63      . ANEMIA   .... Hb 6/8-6/9-6/10-6/11-6/13-6/14-6/15/2025   .... Hb 7.5- 7.8 - 7.6- 7.1 - 8.4 - 7.3- 9.1     . TRANSFUSION  .... 6/8  1 u prbc    .... 6/14 2 u prbc     GI.   . DIET .   .... dash 6/8/2025     . LFT MONITORING   .... LFTS    6/8/2025  ........ AP     39  ........ AST   11  ........ ALT    35    RENAL.  . DATA  .... Na 6/8/2025 Na 137  .... K 6/8/2025 K 4   .... CO2 6/8/2025 co2 29   .... ag 6/8/2025 ag 9  .... alb 6/8/2025 alb 3.4     . KIMBERLY ON CKD   .... Cr 6/8-6/9-6/10-6/11-6/13-6/14/2025   .... Cr 1.9 - 1.4 - 1.3 - 1.3 - 1.1 - 1  .... Cr 5/27-5/28-5/29-5/30-6/1/2025   .... Cr 1.2 - 1.3 - 1.3 - 1.6 - 1.6    ENDO.  . DM  .  .... 6/8/2025 SL SCALE     NEURO.  . PAIN  .... GABAPENTIN 6/8/2025 g 400.2     XXXXXXXXXXXXXXXXXXXXXX   SUMMARY BASELINE .     84 yr old female pt of Dr Gallegos not on home ox or home cpap used to use trelegy lives with spouse quit 40 y 1/2 ppd with PMHx afib on eliquis, COPD (not on home O2), PE/Rt lower extremity DVT 2017, Rt LE IVC filter 2017 CKD3, aplastic anemia, polymyalgia rheumatica on prednisone 5 mg po qd, requiring PRBC transfusions around 8 weeks (via Rt subclavian mediport), AVN bilat hips, HTN, HLD, HFpEF (75% 6.2.25), diastolic dysfx grade1, recent hospitalization 5/25/25-6/5/25 for ADHF/COPD exacerbation, Pt with eventual improvement and transfer to [facility] facility   CC.   . 6/8/2025 R CHEST PAIN   MAIN ISSUES.  . COPD   . MILD HEMOPTYSOIS 6/10/2025   . PNEUMONIA RML 6/8/2025  .... ZOSYN 6/8/2025   . PLEURITIS CHEST PAIN 6/10/2025  .... 6/10 vte excluded vlp vq   . SHOCK 6/8/2025   .... resolvd tr oo icu 6/9   . NOREPI 6/8-6/9/2025   . STRESS STEROIDS   .... HYDROCORTISONE 6/8/2025  . A fib (chronic) POA 6/8/2025  .... 6/10-6/10/2025 IV UFH   .... 6/12 IV UFH   . ANEMIA Hb 6/8-6/13/2025 Hb 7.5- 8.4  . TRANSFUSION  .... 6/8  1 u prbc    . KIMBERLY ON CKD Cr 6/8-6/13/2025 Cr 1.9- 1.1  PMH.   . AFon Eliquis,   . COPD (not on home o2),   . CKD,   . aplastic anemia,   . TRANSFUSION 6/2/2025 1 u prbc   . PMR (chronic prednisone with AVN hip),   . HLD,   . HTN,   . DM    PROCEDURES/DEVICES .  PAST PROCEDURES   . r mediport poa 6/8/2025     DISCUSSIONS.  .... Discussed with primary care and relevant consultants on an ongoing basis       TIME SPENT.  . Over 36 minutes aggregate care time spent on encounter; activities included   direct patient care, counseling and/or coordinating care reviewing notes, lab data/ imaging , discussion with multidisciplinary team/ patient  /family and explaining in detail risks, benefits, alternatives  of the recommendations     ROBERT HENRIQUEZ 84 6/8/2025 1942

## 2025-06-15 NOTE — PROGRESS NOTE ADULT - ASSESSMENT
84 female PMH of A-fib on Eliquis, COPD, CKD, aplastic anemia, polymyalgia rheumatica, on chronic steroids, avascular necrosis of hips, HLD, HTN, DM, recent admission to [place] 5/26 through 6/5/2025 for CHF/fluid overload/COPD exacerbation, presents to the ED form [facility] for evaluation of fever and generalized feeling of being unwell, found to be septic and hypotensive.     THIS IS ONGOING DOCUMENTATION AND NOT COMPLETED>   Anemia/Atypical CP, PAfib, HTN  - With symptomatic anemia.   - s/p PRBC's for Hgb 7.     - Eliquis on hold  -  Heparin gtt held for hemoptysis for now   Heme following for MDS  - Transfuse per Primary  -For bronch by pulm 6/16    - No known Hx of CAD  - CP likely in the setting of significant anemia or pleuritic.  Trops x 2 neg  - Had atypical CP, 6/11  - CxR shows a focal consolidation in the right lung.  Abx per Primary  - Initiate incentive spirometer  - EKG with no signs of acute ischemia    - Bp stable    - Has baseline dysautonomia with resultant severe orthostasis.    - s/p IV resuscitation and IV pressor  - Continue Midodrine and Northera    - ECHO 6/2/25:  LVEF 71%, LVH, and mod pHTN.  No other significant valvular disease  - Would hold her diuretic therapy at this time given that she appears dry on exam.  - Wean O2; satting  on 1L NC    - Hx of paroxysmal atrial fibrillation and DVT/PE  - - Eliquis on hold  -  Heparin gtt held for hemoptysis for now   Heme following for MDS  - off tele   - Continue home Amiodarone 100 mg daily    - Monitor and replete electrolytes. Keep K>4.0 and Mg>2.0.  - Will continue to follow    Hussain Sal DNP, [facility]  Cardiology   Call Teams               84 female PMH of A-fib on Eliquis, COPD, CKD, aplastic anemia, polymyalgia rheumatica, on chronic steroids, avascular necrosis of hips, HLD, HTN, DM, recent admission to [place] 5/26 through 6/5/2025 for CHF/fluid overload/COPD exacerbation, presents to the ED form [facility] for evaluation of fever and generalized feeling of being unwell, found to be septic and hypotensive.       Anemia/Atypical CP, PAfib, HTN  - With symptomatic anemia.   - s/p PRBC's for Hgb 7.     - Eliquis on hold  -  Heparin gtt held for hemoptysis for now   Heme following for MDS  - Transfuse per Primary  -For bronch by pulm 6/16    - No known Hx of CAD  - CP likely in the setting of significant anemia or pleuritic.  Trops x 2 neg  - Had atypical CP, 6/11  - CxR shows a focal consolidation in the right lung.  Abx per Primary  - Initiate incentive spirometer  - EKG with no signs of acute ischemia    - Bp stable    - Has baseline dysautonomia with resultant severe orthostasis.    - s/p IV resuscitation and IV pressor  - Continue Midodrine and Northera    - ECHO 6/2/25:  LVEF 71%, LVH, and mod pHTN.  No other significant valvular disease  - Would hold her diuretic therapy at this time given that she appears dry on exam.  - Wean O2; satting  on 1L NC    - Hx of paroxysmal atrial fibrillation and DVT/PE  - - Eliquis on hold  -  Heparin gtt held for hemoptysis for now   Heme following for MDS  - off tele   - Continue home Amiodarone 100 mg daily    - Optimized for the bronch  from a cardiac point of view with no evidence of active ischemic heart disease, decompensated heart failure, severe obstructive valvular disease, or uncontrolled arrhythmia.  - BP well controlled, monitor routine hemodynamic   - Monitor and replete electrolytes. Keep K>4.0 and Mg>2.0.  - Will continue to follow    Hussain Sal DNP, [facility]  Cardiology   Call Teams

## 2025-06-15 NOTE — PROGRESS NOTE ADULT - ASSESSMENT
84F h/o AFib on Eliquis, COPD, CKD, aplastic anemia, polymyalgia rheumatica on chronic steroids, avascular necrosis of hips, HTN, HLD, with recent admission to [place] 5/26 - 6/5 for acute HFpEF exacerbation and COPD exacerbation, discharged to rehab on 6/5 and returning today with right sided chest pain, general malaise and feeling unwell. She reports some slight cough though otherwise no other new symptoms reported. Found to be hypotensive, febrile w/ leukocytosis. Admitted to ICU for septic shock likely 2/2 to PNA and [facility]. Now stable for downgrade to tele.

## 2025-06-15 NOTE — PROGRESS NOTE ADULT - SUBJECTIVE AND OBJECTIVE BOX
Interval History:  s/p 2 units PRBC without complication 6/14/2025  still with hemoptysis    Chart reviewed and events noted;   Overnight events:    MEDICATIONS  (STANDING):  albuterol/ipratropium for Nebulization 3 milliLiter(s) Nebulizer every 6 hours  aMIOdarone    Tablet 100 milliGRAM(s) Oral daily  chlorhexidine 2% Cloths 1 Application(s) Topical <User Schedule>  dextrose 5%. 1000 milliLiter(s) (50 mL/Hr) IV Continuous <Continuous>  dextrose 5%. 1000 milliLiter(s) (100 mL/Hr) IV Continuous <Continuous>  dextrose 50% Injectable 12.5 Gram(s) IV Push once  dextrose 50% Injectable 25 Gram(s) IV Push once  dextrose 50% Injectable 25 Gram(s) IV Push once  droxidopa 100 milliGRAM(s) Oral every 8 hours  fluticasone propionate/ salmeterol 500-50 MICROgram(s) Diskus 1 Dose(s) Inhalation two times a day  gabapentin 400 milliGRAM(s) Oral every 12 hours  glucagon  Injectable 1 milliGRAM(s) IntraMuscular once  hydrocortisone sodium succinate Injectable 25 milliGRAM(s) IV Push daily  insulin lispro (ADMELOG) corrective regimen sliding scale   SubCutaneous three times a day before meals  insulin lispro (ADMELOG) corrective regimen sliding scale   SubCutaneous at bedtime  midodrine 10 milliGRAM(s) Oral every 8 hours  montelukast 10 milliGRAM(s) Oral daily  pantoprazole  Injectable 40 milliGRAM(s) IV Push daily  piperacillin/tazobactam IVPB.. 3.375 Gram(s) IV Intermittent every 8 hours  tiotropium 2.5 MICROgram(s) Inhaler 2 Puff(s) Inhalation daily    MEDICATIONS  (PRN):  acetaminophen     Tablet .. 650 milliGRAM(s) Oral every 6 hours PRN Mild Pain (1 - 3)  dextrose Oral Gel 15 Gram(s) Oral once PRN Blood Glucose LESS THAN 70 milliGRAM(s)/deciliter      Vital Signs Last 24 Hrs  T(C): 36.6 (15 Nick 2025 11:43), Max: 37.1 (14 Jun 2025 20:40)  T(F): 97.8 (15 Nick 2025 11:43), Max: 98.7 (14 Jun 2025 20:40)  HR: 85 (15 Nick 2025 14:32) (81 - 89)  BP: 110/57 (15 Nick 2025 11:43) (110/57 - 130/62)  BP(mean): --  RR: 18 (15 Nick 2025 11:43) (17 - 18)  SpO2: 94% (15 Nick 2025 14:32) (91% - 96%)    Parameters below as of 15 Nick 2025 14:32  Patient On (Oxygen Delivery Method): nasal cannula, 4 LPM        PHYSICAL EXAM  General: adult in NAD, chronically ill appearing  HEENT: clear oropharynx, anicteric sclera, pink conjunctivae  Neck: supple  CV: normal S1S2 with no murmur rubs or gallops  Lungs: clear to auscultation, no wheezes, no rhales  Abdomen: soft non-tender non-distended, no hepato/splenomegaly  Ext: no clubbing cyanosis or edema  Skin: significant ecchymoses left forearm  Neuro: alert and oriented X3 no focal deficits      LABS:  CBC Full  -  ( 15 Nick 2025 06:50 )  WBC Count : 14.51 K/uL  RBC Count : 2.97 M/uL  Hemoglobin : 9.1 g/dL  Hematocrit : 26.7 %  Platelet Count - Automated : 232 K/uL  Mean Cell Volume : 89.9 fl  Mean Cell Hemoglobin : 30.6 pg  Mean Cell Hemoglobin Concentration : 34.1 g/dL  Auto Neutrophil # : x  Auto Lymphocyte # : x  Auto Monocyte # : x  Auto Eosinophil # : x  Auto Basophil # : x  Auto Neutrophil % : x  Auto Lymphocyte % : x  Auto Monocyte % : x  Auto Eosinophil % : x  Auto Basophil % : x    06-15    141  |  106  |  31[H]  ----------------------------<  108[H]  3.8   |  30  |  0.96    Ca    8.7      15 Nick 2025 06:50  Phos  2.6     06-15    TPro  5.5[L]  /  Alb  2.8[L]  /  TBili  0.9  /  DBili  x   /  AST  6[L]  /  ALT  27  /  AlkPhos  40  06-15    PT/INR - ( 15 Nick 2025 06:50 )   PT: 15.0 sec;   INR: 1.27 ratio             fe studies      WBC trend  14.51 K/uL (06-15-25 @ 06:50)  18.77 K/uL (06-14-25 @ 08:13)  15.96 K/uL (06-13-25 @ 11:40)      Hgb trend  9.1 g/dL (06-15-25 @ 06:50)  7.3 g/dL (06-14-25 @ 08:13)  8.4 g/dL (06-13-25 @ 11:40)      plt trend  232 K/uL (06-15-25 @ 06:50)  241 K/uL (06-14-25 @ 08:13)  273 K/uL (06-13-25 @ 11:40)        RADIOLOGY & ADDITIONAL STUDIES:

## 2025-06-15 NOTE — PROGRESS NOTE ADULT - ASSESSMENT
IMPRESSION  J18.8:     Other pneumonia, unspecified organism  A41.8:    Other specified sepsis  D46.7:    Other myelodysplastic syndromes  J44.0:     Chronic obstructive pulmonary disease with acute lower respiratory infection  I50          congestive heart failure  D63.8     Anemia chronic disease  D63.1     Anemia CKD  N18.31   CKD3a    Myelodysplasia who is transfusion and procrit dependent recently admitted with CHF and COPD exacerbation  Hgb declined during last admission s/p 1UPRBC 06/02   Hgb 7.6 increased to 8.5 post transfusion  Recently DC to [facility]  readmitted with pneumonia, sepsis to MICU.   transfered to medical unit 06/10/25  hgb 9.1 today      RECOMMENDATIONS  follow CBC and transfuse as indicated  heparin has been discontinued  continue present cardiac management  receiving 2 units PRBC 6/14/2025  scheduled for bronchoscopy tomorrow

## 2025-06-15 NOTE — PROGRESS NOTE ADULT - SUBJECTIVE AND OBJECTIVE BOX
[facility] -- Sai Valle Pannella, Patel, Savella, Goodger, Cohen: Office # [phone]    Follow Up:  Hypotension    Subjective/Observations:     REVIEW OF SYSTEMS: All other review of systems are negative unless indicated above    PAST MEDICAL & SURGICAL HISTORY:  COPD (chronic obstructive pulmonary disease)      DM (diabetes mellitus)  diet-controlled      PAF (paroxysmal atrial fibrillation)  s/p ablation      Hiatal hernia      H/O aplastic anemia      History of IBS      H/O osteoporosis      HLD (hyperlipidemia)      PMR (polymyalgia rheumatica)      History of basal cell cancer      Chronic kidney disease (CKD)      Hypotension      Presence of IVC filter      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      History of immunodeficiency      Lumbar compression fracture      H/O hiatal hernia      H/O pulmonary fibrosis      H/O sinus bradycardia      History of fracture of right hip  "unoperable"      S/P hysterectomy      S/P foot surgery      S/P knee surgery      After cataract  bilateral eye cataract surgically removed      Closed right hip fracture  rigth hip ORIF july 19 2016      S/P IVC filter  nov 2016      S/P carpal tunnel release  Right 2008 & 6/27/17      H/O kyphoplasty      Port-A-Cath in place  right chest          MEDICATIONS  (STANDING):  albuterol/ipratropium for Nebulization 3 milliLiter(s) Nebulizer every 6 hours  aMIOdarone    Tablet 100 milliGRAM(s) Oral daily  chlorhexidine 2% Cloths 1 Application(s) Topical <User Schedule>  dextrose 5%. 1000 milliLiter(s) (50 mL/Hr) IV Continuous <Continuous>  dextrose 5%. 1000 milliLiter(s) (100 mL/Hr) IV Continuous <Continuous>  dextrose 50% Injectable 25 Gram(s) IV Push once  dextrose 50% Injectable 12.5 Gram(s) IV Push once  dextrose 50% Injectable 25 Gram(s) IV Push once  droxidopa 100 milliGRAM(s) Oral every 8 hours  fluticasone propionate/ salmeterol 500-50 MICROgram(s) Diskus 1 Dose(s) Inhalation two times a day  gabapentin 400 milliGRAM(s) Oral every 12 hours  glucagon  Injectable 1 milliGRAM(s) IntraMuscular once  hydrocortisone sodium succinate Injectable 25 milliGRAM(s) IV Push daily  insulin lispro (ADMELOG) corrective regimen sliding scale   SubCutaneous three times a day before meals  insulin lispro (ADMELOG) corrective regimen sliding scale   SubCutaneous at bedtime  midodrine 10 milliGRAM(s) Oral every 8 hours  montelukast 10 milliGRAM(s) Oral daily  pantoprazole  Injectable 40 milliGRAM(s) IV Push daily  tiotropium 2.5 MICROgram(s) Inhaler 2 Puff(s) Inhalation daily    MEDICATIONS  (PRN):  acetaminophen     Tablet .. 650 milliGRAM(s) Oral every 6 hours PRN Mild Pain (1 - 3)  dextrose Oral Gel 15 Gram(s) Oral once PRN Blood Glucose LESS THAN 70 milliGRAM(s)/deciliter    Allergies    No Known Allergies    Intolerances      Vital Signs Last 24 Hrs  T(C): 36.9 (15 Nick 2025 04:26), Max: 37.2 (14 Jun 2025 20:12)  T(F): 98.4 (15 Nick 2025 04:26), Max: 99 (14 Jun 2025 20:12)  HR: 81 (15 Nick 2025 04:26) (74 - 95)  BP: 128/72 (15 Nick 2025 04:26) (124/77 - 134/71)  BP(mean): --  RR: 18 (15 Nick 2025 04:26) (16 - 18)  SpO2: 94% (15 Nick 2025 04:26) (91% - 98%)    Parameters below as of 15 Nick 2025 04:26  Patient On (Oxygen Delivery Method): nasal cannula  O2 Flow (L/min): 2    I&O's Summary    14 Jun 2025 07:01  -  15 Nick 2025 07:00  --------------------------------------------------------  IN: 0 mL / OUT: 601 mL / NET: -601 mL          TELE:   PHYSICAL EXAM:  Constitutional: NAD, awake and alert  HEENT: Moist Mucous Membranes, Anicteric  Pulmonary: Non-labored, breath sounds are clear bilaterally, No wheezing, rales or rhonchi  Cardiovascular: Regular, S1 and S2, + murmur No rubs, gallops or clicks   Gastrointestinal:  soft, nontender, nondistended   Lymph: No peripheral edema. No lymphadenopathy.   Skin: No visible rashes or ulcers.  Psych:  Mood & affect appropriate      LABS: All Labs Reviewed:                        7.3    18.77 )-----------( 241      ( 14 Jun 2025 08:13 )             23.0                         8.4    15.96 )-----------( 273      ( 13 Jun 2025 11:40 )             25.7                         8.8    12.65 )-----------( 287      ( 12 Jun 2025 17:14 )             27.2     14 Jun 2025 08:13    140    |  104    |  34     ----------------------------<  114    4.2     |  28     |  1.00   13 Jun 2025 16:10    137    |  106    |  40     ----------------------------<  101    4.5     |  27     |  1.10   13 Jun 2025 07:30    136    |  108    |  42     ----------------------------<  62     6.0     |  23     |  1.20     Ca    8.7        14 Jun 2025 08:13  Ca    9.0        13 Jun 2025 16:10  Ca    8.6        13 Jun 2025 07:30  Phos  2.1       13 Jun 2025 07:30  Phos  2.0       12 Jun 2025 09:20  Mg     1.6       13 Jun 2025 07:30  Mg     1.7       12 Jun 2025 09:20    TPro  5.8    /  Alb  3.1    /  TBili  0.7    /  DBili  x      /  AST  <3     /  ALT  23     /  AlkPhos  37     12 Jun 2025 09:20     PTT - ( 13 Jun 2025 07:30 )  PTT:68.8 secTroponin I, High Sensitivity Result: 24.3 ng/L (06-09-25 @ 05:48)  Troponin I, High Sensitivity Result: 24.7 ng/L (06-08-25 @ 19:35)  Troponin I, High Sensitivity Result: 32.8 ng/L (06-08-25 @ 11:20)    12 Lead ECG:   Ventricular Rate 80 BPM    Atrial Rate 80 BPM    P-R Interval 132 ms    QRS Duration 86 ms    Q-T Interval 398 ms    QTC Calculation(Bazett) 459 ms    P Axis 75 degrees    R Axis -35 degrees    T Axis 78 degrees    Diagnosis Line Sinus rhythm with premature supraventricular complexes  Left axis deviation  Possible Lateral infarct , age undetermined  Abnormal ECG  When compared with ECG of 03-JUN-2025 22:27,  premature ventricular complexes are no longer present  Confirmed by ROSALVA GOODRICH ([id number]) on 6/8/2025 9:50:39 PM (06-08-25 @ 10:58)      TRANSTHORACIC ECHOCARDIOGRAM REPORT  ________________________________________________________________________________                                      _______       Pt. Name:       MARTINEZ JONES Study Date:    6/12/2025  MRN:            [id number]        YOB: 1940  Accession #:    [id number]       Age:           84 years  Account#:       [id number]      Gender:        F  Heart Rate:                     Height:        62.99 in (160.00 cm)  Rhythm:                         Weight:        147.71 lb (67.00 kg)  Blood Pressure: 109/63 mmHg     BSA/BMI:       1.70 m² / 26.17 kg/m²  ________________________________________________________________________________________  Referring Physician:    [id number] Naresh Vitale  Interpreting Physician: Daniel Jorge M.D.  Primary Sonographer:    Jamal Hayes    CPT:               ECHO TTE WO CON COMP W DOPP - 11326.m  Indication(s):     Shock, unspecified - R57.9  Procedure:         Transthoracic echocardiogram with 2-D, M-mode and complete                     spectral and color flow Doppler.  Ordering Location: ICU1  Admission Status:  Inpatient  Study Information: Image quality for this study is technically difficult.    _______________________________________________________________________________________     CONCLUSIONS:      1. Technically difficult image quality.   2. Left ventricular systolic function is normal with an ejection fraction of 67 % by Arias's method of disks.   3. Mild left ventricular hypertrophy.   4.Normal right ventricular cavity size and probably normal right ventricular systolic function.   5. Tricuspid aortic valve with normal leaflet excursion. There is calcification of the aortic valve leaflets.   6. Moderate mitral valve leaflet calcification.   7. Mild mitral regurgitation.   8. Moderate tricuspid regurgitation.   9. The inferior vena cava is normal in size measuring 1.64 cm in diameter, (normal <2.1cm) with normal inspiratory collapse (normal >50%) consistent with normal right atrial pressure (~3, range 0-5mmHg).  10. Estimated pulmonary artery systolic pressure is 57 mmHg, consistent with moderate-to-severe pulmonary hypertension.  11. Trace pericardial effusion.  12. Right pleural effusion noted.  13. Compared to the transthoracic echocardiogram performed on 6/2/2025, there have been no significant interval changes.    ________________________________________________________________________________________  FINDINGS:     Left Ventricle:  Left ventricular systolic function is normal with a calculated ejection fraction of 67 % by the Arias's biplane method of disks. Mild left ventricular hypertrophy.     Right Ventricle:  The right ventricular cavity is normal in size and right ventricular systolic function is probablynormal. Tricuspid annular plane systolic excursion (TAPSE) is 1.9 cm (normal >=1.7 cm).     Left Atrium:  The left atrium is normal in size with an indexed volume of 29.06 ml/m².     Right Atrium:  The right atrium is normal in size with an indexed volume of 26.00 ml/m² and an indexed area of 9.41 cm²/m².     Interatrial Septum:  The interatrial septum appears intact.     Aortic Valve:  The aortic valve is tricuspid with normal leaflet excursion. There is calcification of the aortic valve leaflets. There is mild thickening of the aortic valve leaflets. There is trace aortic regurgitation.     Mitral Valve:  Structurally normal mitral valve with normal leaflet excursion. There is calcification of the mitral valve annulus. There is moderate leaflet calcification. There is mild mitral regurgitation.     Tricuspid Valve:  The tricuspid valve is structurally normal with normal leaflet excursion. There is moderate tricuspid regurgitation. Estimated pulmonary artery systolic pressure is 57 mmHg,consistent with moderate-to-severe pulmonary hypertension.     Pulmonic Valve:  The pulmonic valve was not well visualized. There is trace pulmonic regurgitation.     Aorta:  The aortic root at the sinuses of Valsalva is normal in size, measuring 3.30 cm (indexed 1.94 cm/m²). The ascending aorta is normal in size, measuring 3.00 cm (indexed 1.76 cm/m²). The aortic arch diameter is normal in size, measuring 2.4 cm (indexed 1.41 cm/m²).     Pericardium:  There is a trace pericardial effusion.     Pleura:  Right pleural effusion noted.     Systemic Veins:  The inferior vena cava is normal in size measuring 1.64 cm in diameter, (normal <2.1cm) with normal inspiratory collapse (normal >50%) consistent with normal right atrial pressure (~3, range 0-5mmHg).  ____________________________________________________________________  QUANTITATIVE DATA:  Left Ventricle Measurements: (Indexed to BSA)     IVSd (2D):   1.2 cm  LVPWd (2D):  1.1 cm  LVIDd (2D):  4.2 cm  LVIDs (2D):  2.4 cm  LV Mass:     169 g  99.5 g/m²  LV Vol d, MOD A2C: 38.9 ml 22.88 ml/m²  LV Vol d, MOD A4C: 40.9 ml 24.06 ml/m²  LV Vol d, MOD BP:  40.9 ml 24.04 ml/m²  LV Vol s, MOD A2C: 11.3 ml 6.65 ml/m²  LV Vol s, MOD A4C: 13.4 ml 7.88 ml/m²  LV Vol s, MOD BP:  13.4 ml 7.91 ml/m²  LVOT SV MOD BP:    27.4 ml  LV EF% MOD BP:     67 %     MV E Vmax:    1.54 m/s  MV A Vmax:    0.74 m/s  MV E/A:       2.08  e' lateral:   11.90 cm/s  e' medial:    8.81 cm/s  E/e' lateral: 12.94  E/e' medial:  17.48  E/e' Average: 14.87  MV DT:272 msec    Aorta Measurements: (Normal range) (Indexed to BSA)     Ao Root d     3.30 cm (2.7 - 3.3 cm) 1.94 cm/m²  Ao Asc d, 2D: 3.00  Ao Asc prox:  3.00 cm                1.76 cm/m²  Ao Arch:      2.4 cm            Left Atrium Measurements: (Indexed to BSA)  LA Diam 2D: 3.60 cm         Right Ventricle Measurements: Right Atrial Measurements:     TAPSE:            1.9 cm      RA Vol s, MOD A4C         44.2 ml  RV Base (RVID1):  2.9 cm      RA Vol s, MOD A4C i BSA   26.00 ml/m²  RV Mid (RVID2): 2.5 cm      RA Area s, MOD A4C        16.0 cm²  RV Major (RVID3): 6.4 cm      RA Area s, MOD A4C, i BSA 9.41 cm²/m²       LVOT / RVOT/ Qp/Qs Data: (Indexed to BSA)  LVOT Diameter,s: 2.20 cm  LVOT Area:       3.80 cm²    Mitral Valve Measurements:     MV E Vmax: 1.5 m/s         MR Vmax:          3.80 m/s  MV A Vmax: 0.7 m/s         MR Peak Gradient: 57.8 mmHg  MV E/A:    2.1       Tricuspid Valve Measurements:     TR Vmax:          3.7 m/s  TR Peak Gradient: 54.5 mmHg  RA Pressure:      3 mmHg  PASP:             57 mmHg    ________________________________________________________________________________________  Electronically signed on 6/13/2025 at 11:19:51 AM by Daniel Jorge M.D.         *** Final ***   [facility] -- Sai Valle, Girish Jackson Savella, Goodger, Cohen: Office # [phone]    Follow Up:  Hypotension    Subjective/Observations: No events overnight resting comfortably in bed.  No complaints of chest pain, dyspnea, or palpitations reported. No signs of orthopnea or PND. Remain w/ hemoptysis    REVIEW OF SYSTEMS: All other review of systems are negative unless indicated above    PAST MEDICAL & SURGICAL HISTORY:  COPD (chronic obstructive pulmonary disease)      DM (diabetes mellitus)  diet-controlled      PAF (paroxysmal atrial fibrillation)  s/p ablation      Hiatal hernia      H/O aplastic anemia      History of IBS      H/O osteoporosis      HLD (hyperlipidemia)      PMR (polymyalgia rheumatica)      History of basal cell cancer      Chronic kidney disease (CKD)      Hypotension      Presence of IVC filter      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      History of immunodeficiency      Lumbar compression fracture      H/O hiatal hernia      H/O pulmonary fibrosis      H/O sinus bradycardia      History of fracture of right hip  "unoperable"      S/P hysterectomy      S/P foot surgery      S/P knee surgery      After cataract  bilateral eye cataract surgically removed      Closed right hip fracture  rigth hip ORIF july 19 2016      S/P IVC filter  nov 2016      S/P carpal tunnel release  Right 2008 & 6/27/17      H/O kyphoplasty      Port-A-Cath in place  right chest          MEDICATIONS  (STANDING):  albuterol/ipratropium for Nebulization 3 milliLiter(s) Nebulizer every 6 hours  aMIOdarone    Tablet 100 milliGRAM(s) Oral daily  chlorhexidine 2% Cloths 1 Application(s) Topical <User Schedule>  dextrose 5%. 1000 milliLiter(s) (50 mL/Hr) IV Continuous <Continuous>  dextrose 5%. 1000 milliLiter(s) (100 mL/Hr) IV Continuous <Continuous>  dextrose 50% Injectable 25 Gram(s) IV Push once  dextrose 50% Injectable 12.5 Gram(s) IV Push once  dextrose 50% Injectable 25 Gram(s) IV Push once  droxidopa 100 milliGRAM(s) Oral every 8 hours  fluticasone propionate/ salmeterol 500-50 MICROgram(s) Diskus 1 Dose(s) Inhalation two times a day  gabapentin 400 milliGRAM(s) Oral every 12 hours  glucagon  Injectable 1 milliGRAM(s) IntraMuscular once  hydrocortisone sodium succinate Injectable 25 milliGRAM(s) IV Push daily  insulin lispro (ADMELOG) corrective regimen sliding scale   SubCutaneous three times a day before meals  insulin lispro (ADMELOG) corrective regimen sliding scale   SubCutaneous at bedtime  midodrine 10 milliGRAM(s) Oral every 8 hours  montelukast 10 milliGRAM(s) Oral daily  pantoprazole  Injectable 40 milliGRAM(s) IV Push daily  tiotropium 2.5 MICROgram(s) Inhaler 2 Puff(s) Inhalation daily    MEDICATIONS  (PRN):  acetaminophen     Tablet .. 650 milliGRAM(s) Oral every 6 hours PRN Mild Pain (1 - 3)  dextrose Oral Gel 15 Gram(s) Oral once PRN Blood Glucose LESS THAN 70 milliGRAM(s)/deciliter    Allergies    No Known Allergies    Intolerances      Vital Signs Last 24 Hrs  T(C): 36.9 (15 Nick 2025 04:26), Max: 37.2 (14 Jun 2025 20:12)  T(F): 98.4 (15 Nick 2025 04:26), Max: 99 (14 Jun 2025 20:12)  HR: 81 (15 Nick 2025 04:26) (74 - 95)  BP: 128/72 (15 Nick 2025 04:26) (124/77 - 134/71)  BP(mean): --  RR: 18 (15 Nick 2025 04:26) (16 - 18)  SpO2: 94% (15 Nick 2025 04:26) (91% - 98%)    Parameters below as of 15 Nick 2025 04:26  Patient On (Oxygen Delivery Method): nasal cannula  O2 Flow (L/min): 2    I&O's Summary    14 Jun 2025 07:01  -  15 Nick 2025 07:00  --------------------------------------------------------  IN: 0 mL / OUT: 601 mL / NET: -601 mL          TELE: Off cardiac monitor   PHYSICAL EXAM:  Constitutional: NAD, awake and alert  HEENT: Moist Mucous Membranes, Anicteric  Pulmonary: Non-labored, breath sounds coarse throughout lung fields , No wheezing, crackles or rhonchi   Cardiovascular: Regular, S1 and S2, + murmur No rubs, gallops or clicks   Gastrointestinal:  soft, nontender, nondistended   Lymph: No peripheral edema. No lymphadenopathy.   Skin: No visible rashes or ulcers.  Psych:  Mood & affect appropriate      LABS: All Labs Reviewed:                        7.3    18.77 )-----------( 241      ( 14 Jun 2025 08:13 )             23.0                         8.4    15.96 )-----------( 273      ( 13 Jun 2025 11:40 )             25.7                         8.8    12.65 )-----------( 287      ( 12 Jun 2025 17:14 )             27.2     14 Jun 2025 08:13    140    |  104    |  34     ----------------------------<  114    4.2     |  28     |  1.00   13 Jun 2025 16:10    137    |  106    |  40     ----------------------------<  101    4.5     |  27     |  1.10   13 Jun 2025 07:30    136    |  108    |  42     ----------------------------<  62     6.0     |  23     |  1.20     Ca    8.7        14 Jun 2025 08:13  Ca    9.0        13 Jun 2025 16:10  Ca    8.6        13 Jun 2025 07:30  Phos  2.1       13 Jun 2025 07:30  Phos  2.0       12 Jun 2025 09:20  Mg     1.6       13 Jun 2025 07:30  Mg     1.7       12 Jun 2025 09:20    TPro  5.8    /  Alb  3.1    /  TBili  0.7    /  DBili  x      /  AST  <3     /  ALT  23     /  AlkPhos  37     12 Jun 2025 09:20     PTT - ( 13 Jun 2025 07:30 )  PTT:68.8 secTroponin I, High Sensitivity Result: 24.3 ng/L (06-09-25 @ 05:48)  Troponin I, High Sensitivity Result: 24.7 ng/L (06-08-25 @ 19:35)  Troponin I, High Sensitivity Result: 32.8 ng/L (06-08-25 @ 11:20)    12 Lead ECG:   Ventricular Rate 80 BPM    Atrial Rate 80 BPM    P-R Interval 132 ms    QRS Duration 86 ms    Q-T Interval 398 ms    QTC Calculation(Bazett) 459 ms    P Axis 75 degrees    R Axis -35 degrees    T Axis 78 degrees    Diagnosis Line Sinus rhythm with premature supraventricular complexes  Left axis deviation  Possible Lateral infarct , age undetermined  Abnormal ECG  When compared with ECG of 03-JUN-2025 22:27,  premature ventricular complexes are no longer present  Confirmed by ROSALVA GOODRICH ([id number]) on 6/8/2025 9:50:39 PM (06-08-25 @ 10:58)      TRANSTHORACIC ECHOCARDIOGRAM REPORT  ________________________________________________________________________________                                      _______       Pt. Name:       MARTINEZ JONES Study Date:    6/12/2025  MRN:            [id number]        YOB: 1940  Accession #:    [id number]       Age:           84 years  Account#:       [id number]      Gender:        F  Heart Rate:                     Height:        62.99 in (160.00 cm)  Rhythm:                         Weight:        147.71 lb (67.00 kg)  Blood Pressure: 109/63 mmHg     BSA/BMI:       1.70 m² / 26.17 kg/m²  ________________________________________________________________________________________  Referring Physician:    [id number] Naresh Vitale  Interpreting Physician: Daniel Jorge M.D.  Primary Sonographer:    Jamal Hayes    CPT:               ECHO TTE WO CON COMP W DOPP - 42051.m  Indication(s):     Shock, unspecified - R57.9  Procedure:         Transthoracic echocardiogram with 2-D, M-mode and complete                     spectral and color flow Doppler.  Ordering Location: ICU1  Admission Status:  Inpatient  Study Information: Image quality for this study is technically difficult.    _______________________________________________________________________________________     CONCLUSIONS:      1. Technically difficult image quality.   2. Left ventricular systolic function is normal with an ejection fraction of 67 % by Arias's method of disks.   3. Mild left ventricular hypertrophy.   4.Normal right ventricular cavity size and probably normal right ventricular systolic function.   5. Tricuspid aortic valve with normal leaflet excursion. There is calcification of the aortic valve leaflets.   6. Moderate mitral valve leaflet calcification.   7. Mild mitral regurgitation.   8. Moderate tricuspid regurgitation.   9. The inferior vena cava is normal in size measuring 1.64 cm in diameter, (normal <2.1cm) with normal inspiratory collapse (normal >50%) consistent with normal right atrial pressure (~3, range 0-5mmHg).  10. Estimated pulmonary artery systolic pressure is 57 mmHg, consistent with moderate-to-severe pulmonary hypertension.  11. Trace pericardial effusion.  12. Right pleural effusion noted.  13. Compared to the transthoracic echocardiogram performed on 6/2/2025, there have been no significant interval changes.    ________________________________________________________________________________________  FINDINGS:     Left Ventricle:  Left ventricular systolic function is normal with a calculated ejection fraction of 67 % by the Arias's biplane method of disks. Mild left ventricular hypertrophy.     Right Ventricle:  The right ventricular cavity is normal in size and right ventricular systolic function is probablynormal. Tricuspid annular plane systolic excursion (TAPSE) is 1.9 cm (normal >=1.7 cm).     Left Atrium:  The left atrium is normal in size with an indexed volume of 29.06 ml/m².     Right Atrium:  The right atrium is normal in size with an indexed volume of 26.00 ml/m² and an indexed area of 9.41 cm²/m².     Interatrial Septum:  The interatrial septum appears intact.     Aortic Valve:  The aortic valve is tricuspid with normal leaflet excursion. There is calcification of the aortic valve leaflets. There is mild thickening of the aortic valve leaflets. There is trace aortic regurgitation.     Mitral Valve:  Structurally normal mitral valve with normal leaflet excursion. There is calcification of the mitral valve annulus. There is moderate leaflet calcification. There is mild mitral regurgitation.     Tricuspid Valve:  The tricuspid valve is structurally normal with normal leaflet excursion. There is moderate tricuspid regurgitation. Estimated pulmonary artery systolic pressure is 57 mmHg,consistent with moderate-to-severe pulmonary hypertension.     Pulmonic Valve:  The pulmonic valve was not well visualized. There is trace pulmonic regurgitation.     Aorta:  The aortic root at the sinuses of Valsalva is normal in size, measuring 3.30 cm (indexed 1.94 cm/m²). The ascending aorta is normal in size, measuring 3.00 cm (indexed 1.76 cm/m²). The aortic arch diameter is normal in size, measuring 2.4 cm (indexed 1.41 cm/m²).     Pericardium:  There is a trace pericardial effusion.     Pleura:  Right pleural effusion noted.     Systemic Veins:  The inferior vena cava is normal in size measuring 1.64 cm in diameter, (normal <2.1cm) with normal inspiratory collapse (normal >50%) consistent with normal right atrial pressure (~3, range 0-5mmHg).  ____________________________________________________________________  QUANTITATIVE DATA:  Left Ventricle Measurements: (Indexed to BSA)     IVSd (2D):   1.2 cm  LVPWd (2D):  1.1 cm  LVIDd (2D):  4.2 cm  LVIDs (2D):  2.4 cm  LV Mass:     169 g  99.5 g/m²  LV Vol d, MOD A2C: 38.9 ml 22.88 ml/m²  LV Vol d, MOD A4C: 40.9 ml 24.06 ml/m²  LV Vol d, MOD BP:  40.9 ml 24.04 ml/m²  LV Vol s, MOD A2C: 11.3 ml 6.65 ml/m²  LV Vol s, MOD A4C: 13.4 ml 7.88 ml/m²  LV Vol s, MOD BP:  13.4 ml 7.91 ml/m²  LVOT SV MOD BP:    27.4 ml  LV EF% MOD BP:     67 %     MV E Vmax:    1.54 m/s  MV A Vmax:    0.74 m/s  MV E/A:       2.08  e' lateral:   11.90 cm/s  e' medial:    8.81 cm/s  E/e' lateral: 12.94  E/e' medial:  17.48  E/e' Average: 14.87  MV DT:272 msec    Aorta Measurements: (Normal range) (Indexed to BSA)     Ao Root d     3.30 cm (2.7 - 3.3 cm) 1.94 cm/m²  Ao Asc d, 2D: 3.00  Ao Asc prox:  3.00 cm                1.76 cm/m²  Ao Arch:      2.4 cm            Left Atrium Measurements: (Indexed to BSA)  LA Diam 2D: 3.60 cm         Right Ventricle Measurements: Right Atrial Measurements:     TAPSE:            1.9 cm      RA Vol s, MOD A4C         44.2 ml  RV Base (RVID1):  2.9 cm      RA Vol s, MOD A4C i BSA   26.00 ml/m²  RV Mid (RVID2): 2.5 cm      RA Area s, MOD A4C        16.0 cm²  RV Major (RVID3): 6.4 cm      RA Area s, MOD A4C, i BSA 9.41 cm²/m²       LVOT / RVOT/ Qp/Qs Data: (Indexed to BSA)  LVOT Diameter,s: 2.20 cm  LVOT Area:       3.80 cm²    Mitral Valve Measurements:     MV E Vmax: 1.5 m/s         MR Vmax:          3.80 m/s  MV A Vmax: 0.7 m/s         MR Peak Gradient: 57.8 mmHg  MV E/A:    2.1       Tricuspid Valve Measurements:     TR Vmax:          3.7 m/s  TR Peak Gradient: 54.5 mmHg  RA Pressure:      3 mmHg  PASP:             57 mmHg    ________________________________________________________________________________________  Electronically signed on 6/13/2025 at 11:19:51 AM by Daniel Jorge M.D.         *** Final ***

## 2025-06-15 NOTE — PROGRESS NOTE ADULT - ATTENDING COMMENTS
84F h/o AFib on Eliquis, COPD, CKD, aplastic anemia, polymyalgia rheumatica on chronic steroids, avascular necrosis of hips, HTN, HLD, with recent admission to [facility] 5/26 - 6/5 for acute HFpEF exacerbation and COPD exacerbation, discharged to rehab on 6/5 and returned to ED with right sided chest pain, general malaise and feeling unwell. Admitted to ICU for septic shock and acute hypoxic respiratory failure likely 2/2 to PNA and KIMBERLY. Now downgraded to tele floors. Continue IV Zosyn - discussed with ID Dr Velazco continue for now pending bronchoscopy on Monday. Legionella negative, off Azithromycin. Follow up Quantiferon, serum Fungitell and galactomannan. Taper off supplemental O2 with appropriate SpO2.   Leukocytosis likely related to steroids. Decreased to daily dosing - taper steroids to discontinuation.  Anemia - decreasing H/H - stop heparin gtt. H/H improved s/p additional 2 units pRBC.  Continue midodrine and notherna.  Discussed with daughter Lenore aware and in agreement with above.

## 2025-06-15 NOTE — PROGRESS NOTE ADULT - PROBLEM SELECTOR PLAN 2
In the ED, tmax of 101 w/ KIMBERLY on CKD with Cr 1.90 (baseline 1.2-1.6), Hypotensive with SBP 80's (pt on midodrine therapy, usual 's). also w/ leukocytosis of 32.33. Procalcitonin of 13.6, RVP neg. Pt treated in ICU for septic shock requiring pressors, downgraded from the ICU to telemetry on 6/10  - CT chest 6/10 with multifocal R sided PNA  - continue zosyn; course dependent on bronch  - c/w solucortef 50 q12  - F/U quant, fungitell (specimen received)   - MRSA/MSSA PCR negative  - trend WBC and fever curve, supplemental O2 prn to maintain SPO2 > 92% -- taper o2  - ID consulted Dr Juan A varma appreciated In the ED, tmax of 101 w/ KIMBERLY on CKD with Cr 1.90 (baseline 1.2-1.6), Hypotensive with SBP 80's (pt on midodrine therapy, usual 's). also w/ leukocytosis of 32.33. Procalcitonin of 13.6, RVP neg. Pt treated in ICU for septic shock requiring pressors, downgraded from the ICU to telemetry on 6/10  - CT chest 6/10 with multifocal R sided PNA  - continue zosyn; course dependent on bronch  - c/w solucortef 25ivp daily -- weaning per pulm  - F/U quant, fungitell (specimen received)   - MRSA/MSSA PCR negative  - trend WBC and fever curve, supplemental O2 prn to maintain SPO2 > 92% -- taper o2  - ID consulted Dr Juan A varma appreciated

## 2025-06-16 LAB
ANION GAP SERPL CALC-SCNC: 7 MMOL/L — SIGNIFICANT CHANGE UP (ref 5–17)
BUN SERPL-MCNC: 30 MG/DL — HIGH (ref 7–23)
CALCIUM SERPL-MCNC: 9 MG/DL — SIGNIFICANT CHANGE UP (ref 8.5–10.1)
CHLORIDE SERPL-SCNC: 106 MMOL/L — SIGNIFICANT CHANGE UP (ref 96–108)
CO2 SERPL-SCNC: 30 MMOL/L — SIGNIFICANT CHANGE UP (ref 22–31)
CREAT SERPL-MCNC: 0.99 MG/DL — SIGNIFICANT CHANGE UP (ref 0.5–1.3)
EGFR: 57 ML/MIN/1.73M2 — LOW
EGFR: 57 ML/MIN/1.73M2 — LOW
GAMMA INTERFERON BACKGROUND BLD IA-ACNC: 0.02 IU/ML — SIGNIFICANT CHANGE UP
GLUCOSE SERPL-MCNC: 106 MG/DL — HIGH (ref 70–99)
GRAM STN FLD: SIGNIFICANT CHANGE UP
HCT VFR BLD CALC: 28.6 % — LOW (ref 34.5–45)
HGB BLD-MCNC: 9.6 G/DL — LOW (ref 11.5–15.5)
M TB IFN-G BLD-IMP: ABNORMAL
M TB IFN-G CD4+ BCKGRND COR BLD-ACNC: 0 IU/ML — SIGNIFICANT CHANGE UP
M TB IFN-G CD4+CD8+ BCKGRND COR BLD-ACNC: 0 IU/ML — SIGNIFICANT CHANGE UP
MAGNESIUM SERPL-MCNC: 1.5 MG/DL — LOW (ref 1.6–2.6)
MCHC RBC-ENTMCNC: 31.1 PG — SIGNIFICANT CHANGE UP (ref 27–34)
MCHC RBC-ENTMCNC: 33.6 G/DL — SIGNIFICANT CHANGE UP (ref 32–36)
MCV RBC AUTO: 92.6 FL — SIGNIFICANT CHANGE UP (ref 80–100)
NRBC BLD AUTO-RTO: 0 /100 WBCS — SIGNIFICANT CHANGE UP (ref 0–0)
PHOSPHATE SERPL-MCNC: 2.5 MG/DL — SIGNIFICANT CHANGE UP (ref 2.5–4.5)
PLATELET # BLD AUTO: 306 K/UL — SIGNIFICANT CHANGE UP (ref 150–400)
POTASSIUM SERPL-MCNC: 4 MMOL/L — SIGNIFICANT CHANGE UP (ref 3.5–5.3)
POTASSIUM SERPL-SCNC: 4 MMOL/L — SIGNIFICANT CHANGE UP (ref 3.5–5.3)
QUANT TB PLUS MITOGEN MINUS NIL: 0.02 IU/ML — SIGNIFICANT CHANGE UP
RBC # BLD: 3.09 M/UL — LOW (ref 3.8–5.2)
RBC # FLD: 20.5 % — HIGH (ref 10.3–14.5)
SODIUM SERPL-SCNC: 143 MMOL/L — SIGNIFICANT CHANGE UP (ref 135–145)
SPECIMEN SOURCE: SIGNIFICANT CHANGE UP
WBC # BLD: 16.85 K/UL — HIGH (ref 3.8–10.5)
WBC # FLD AUTO: 16.85 K/UL — HIGH (ref 3.8–10.5)

## 2025-06-16 PROCEDURE — 88305 TISSUE EXAM BY PATHOLOGIST: CPT | Mod: 26

## 2025-06-16 PROCEDURE — G0545: CPT

## 2025-06-16 PROCEDURE — 99233 SBSQ HOSP IP/OBS HIGH 50: CPT | Mod: GC

## 2025-06-16 PROCEDURE — 88112 CYTOPATH CELL ENHANCE TECH: CPT | Mod: 26

## 2025-06-16 PROCEDURE — 99232 SBSQ HOSP IP/OBS MODERATE 35: CPT

## 2025-06-16 RX ORDER — INSULIN LISPRO 100 U/ML
INJECTION, SOLUTION INTRAVENOUS; SUBCUTANEOUS
Refills: 0 | Status: DISCONTINUED | OUTPATIENT
Start: 2025-06-16 | End: 2025-06-22

## 2025-06-16 RX ORDER — DEXTROSE 50 % IN WATER 50 %
15 SYRINGE (ML) INTRAVENOUS ONCE
Refills: 0 | Status: DISCONTINUED | OUTPATIENT
Start: 2025-06-16 | End: 2025-07-04

## 2025-06-16 RX ORDER — PIPERACILLIN-TAZO-DEXTROSE,ISO 3.375G/5
3.38 IV SOLUTION, PIGGYBACK PREMIX FROZEN(ML) INTRAVENOUS EVERY 8 HOURS
Refills: 0 | Status: COMPLETED | OUTPATIENT
Start: 2025-06-16 | End: 2025-06-17

## 2025-06-16 RX ORDER — HYDROCORTISONE 20 MG
25 TABLET ORAL DAILY
Refills: 0 | Status: DISCONTINUED | OUTPATIENT
Start: 2025-06-16 | End: 2025-06-17

## 2025-06-16 RX ORDER — DEXTROSE 50 % IN WATER 50 %
25 SYRINGE (ML) INTRAVENOUS ONCE
Refills: 0 | Status: DISCONTINUED | OUTPATIENT
Start: 2025-06-16 | End: 2025-07-04

## 2025-06-16 RX ORDER — INSULIN LISPRO 100 U/ML
INJECTION, SOLUTION INTRAVENOUS; SUBCUTANEOUS AT BEDTIME
Refills: 0 | Status: DISCONTINUED | OUTPATIENT
Start: 2025-06-16 | End: 2025-06-22

## 2025-06-16 RX ORDER — MAGNESIUM SULFATE 500 MG/ML
2 SYRINGE (ML) INJECTION ONCE
Refills: 0 | Status: COMPLETED | OUTPATIENT
Start: 2025-06-16 | End: 2025-06-16

## 2025-06-16 RX ORDER — IPRATROPIUM BROMIDE AND ALBUTEROL SULFATE .5; 2.5 MG/3ML; MG/3ML
3 SOLUTION RESPIRATORY (INHALATION) EVERY 6 HOURS
Refills: 0 | Status: DISCONTINUED | OUTPATIENT
Start: 2025-06-16 | End: 2025-06-25

## 2025-06-16 RX ORDER — GABAPENTIN 400 MG/1
400 CAPSULE ORAL EVERY 12 HOURS
Refills: 0 | Status: DISCONTINUED | OUTPATIENT
Start: 2025-06-16 | End: 2025-06-28

## 2025-06-16 RX ORDER — DEXTROSE 50 % IN WATER 50 %
12.5 SYRINGE (ML) INTRAVENOUS ONCE
Refills: 0 | Status: DISCONTINUED | OUTPATIENT
Start: 2025-06-16 | End: 2025-07-04

## 2025-06-16 RX ORDER — MAGNESIUM SULFATE 500 MG/ML
2 SYRINGE (ML) INJECTION ONCE
Refills: 0 | Status: DISCONTINUED | OUTPATIENT
Start: 2025-06-16 | End: 2025-06-16

## 2025-06-16 RX ADMIN — Medication 25 GRAM(S): at 15:34

## 2025-06-16 RX ADMIN — Medication 25 MILLIGRAM(S): at 06:06

## 2025-06-16 RX ADMIN — Medication 1 APPLICATION(S): at 06:02

## 2025-06-16 RX ADMIN — TIOTROPIUM BROMIDE INHALATION SPRAY 2 PUFF(S): 3.12 SPRAY, METERED RESPIRATORY (INHALATION) at 06:02

## 2025-06-16 RX ADMIN — IPRATROPIUM BROMIDE AND ALBUTEROL SULFATE 3 MILLILITER(S): .5; 2.5 SOLUTION RESPIRATORY (INHALATION) at 00:45

## 2025-06-16 RX ADMIN — IPRATROPIUM BROMIDE AND ALBUTEROL SULFATE 3 MILLILITER(S): .5; 2.5 SOLUTION RESPIRATORY (INHALATION) at 07:48

## 2025-06-16 RX ADMIN — IPRATROPIUM BROMIDE AND ALBUTEROL SULFATE 3 MILLILITER(S): .5; 2.5 SOLUTION RESPIRATORY (INHALATION) at 20:51

## 2025-06-16 RX ADMIN — DROXIDOPA 100 MILLIGRAM(S): 300 CAPSULE ORAL at 06:06

## 2025-06-16 RX ADMIN — AMIODARONE HYDROCHLORIDE 100 MILLIGRAM(S): 50 INJECTION, SOLUTION INTRAVENOUS at 06:03

## 2025-06-16 RX ADMIN — Medication 25 GRAM(S): at 06:03

## 2025-06-16 RX ADMIN — Medication 25 GRAM(S): at 21:41

## 2025-06-16 RX ADMIN — Medication 1 DOSE(S): at 06:02

## 2025-06-16 RX ADMIN — GABAPENTIN 400 MILLIGRAM(S): 400 CAPSULE ORAL at 06:03

## 2025-06-16 NOTE — PROGRESS NOTE ADULT - PROBLEM SELECTOR PLAN 3
- The patient is noted to have blood-tinged sputum  - requiring PRBC transfusions ~8 weeks (via Rt subclavian mediport)  - Hgb 7.6 (baseline appears to be ~8)   - transfuse <7-7.5  - Will hold Eliquis until clinical improvement is noted  - 6/12 heparin gtt started  - 6/13 : heparin gtt now discontinued 2/2 hemoptysis  - Will give 2 unit PRBC- will give lasix 40mg IVP x1 in between units -- good response

## 2025-06-16 NOTE — PROGRESS NOTE ADULT - ASSESSMENT
84F h/o AFib on Eliquis, COPD, CKD, aplastic anemia, polymyalgia rheumatica on chronic steroids, avascular necrosis of hips, HTN, HLD, with recent admission to [facility] 5/26 - 6/5 for acute HFpEF exacerbation and COPD exacerbation, discharged to rehab on 6/5 and returning today with right sided chest pain, general malaise and feeling unwell. She reports some slight cough though otherwise no other new symptoms reported. Found to be hypotensive, febrile w/ leukocytosis. Admitted to ICU for septic shock likely 2/2 to PNA and KIMBERLY. Now stable for downgrade to tele.

## 2025-06-16 NOTE — PROGRESS NOTE ADULT - SUBJECTIVE AND OBJECTIVE BOX
[facility] -- Sai Valle Pannella, Patel, Savella Goodger, Cohen  Office # [phone]      Follow Up:  Hypotension     Subjective/Observations:   No events overnight resting comfortably in bed.  No complaints of chest pain, dyspnea, or palpitations reported. No signs of orthopnea or PND.      REVIEW OF SYSTEMS: All other review of systems is negative unless indicated above    PAST MEDICAL & SURGICAL HISTORY:  COPD (chronic obstructive pulmonary disease)      DM (diabetes mellitus)  diet-controlled      PAF (paroxysmal atrial fibrillation)  s/p ablation      Hiatal hernia      H/O aplastic anemia      History of IBS      H/O osteoporosis      HLD (hyperlipidemia)      PMR (polymyalgia rheumatica)      History of basal cell cancer      Chronic kidney disease (CKD)      Hypotension      Presence of IVC filter      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      History of immunodeficiency      Lumbar compression fracture      H/O hiatal hernia      H/O pulmonary fibrosis      H/O sinus bradycardia      History of fracture of right hip  "unoperable"      S/P hysterectomy      S/P foot surgery      S/P knee surgery      After cataract  bilateral eye cataract surgically removed      Closed right hip fracture  rigth hip ORIF 2016      S/P IVC filter  2016      S/P carpal tunnel release  Right  & 17      H/O kyphoplasty      Port-A-Cath in place  right chest          MEDICATIONS  (STANDING):  albuterol/ipratropium for Nebulization 3 milliLiter(s) Nebulizer every 6 hours  aMIOdarone    Tablet 100 milliGRAM(s) Oral daily  chlorhexidine 2% Cloths 1 Application(s) Topical <User Schedule>  dextrose 5%. 1000 milliLiter(s) (100 mL/Hr) IV Continuous <Continuous>  dextrose 5%. 1000 milliLiter(s) (50 mL/Hr) IV Continuous <Continuous>  dextrose 50% Injectable 25 Gram(s) IV Push once  dextrose 50% Injectable 12.5 Gram(s) IV Push once  dextrose 50% Injectable 25 Gram(s) IV Push once  droxidopa 100 milliGRAM(s) Oral every 8 hours  fluticasone propionate/ salmeterol 500-50 MICROgram(s) Diskus 1 Dose(s) Inhalation two times a day  gabapentin 400 milliGRAM(s) Oral every 12 hours  glucagon  Injectable 1 milliGRAM(s) IntraMuscular once  hydrocortisone sodium succinate Injectable 25 milliGRAM(s) IV Push daily  insulin lispro (ADMELOG) corrective regimen sliding scale   SubCutaneous three times a day before meals  insulin lispro (ADMELOG) corrective regimen sliding scale   SubCutaneous at bedtime  midodrine 10 milliGRAM(s) Oral every 8 hours  montelukast 10 milliGRAM(s) Oral daily  pantoprazole  Injectable 40 milliGRAM(s) IV Push daily  piperacillin/tazobactam IVPB.. 3.375 Gram(s) IV Intermittent every 8 hours  sodium chloride 0.45%. 1000 milliLiter(s) (50 mL/Hr) IV Continuous <Continuous>  tiotropium 2.5 MICROgram(s) Inhaler 2 Puff(s) Inhalation daily    MEDICATIONS  (PRN):  acetaminophen     Tablet .. 650 milliGRAM(s) Oral every 6 hours PRN Mild Pain (1 - 3)  dextrose Oral Gel 15 Gram(s) Oral once PRN Blood Glucose LESS THAN 70 milliGRAM(s)/deciliter      Allergies    No Known Allergies    Intolerances        Vital Signs Last 24 Hrs  T(C): 37.1 (2025 05:10), Max: 37.1 (15 Nick 2025 20:40)  T(F): 98.8 (2025 05:10), Max: 98.8 (15 Nick 2025 20:40)  HR: 86 (2025 05:10) (70 - 86)  BP: 149/77 (2025 05:10) (110/57 - 149/77)  BP(mean): --  RR: 18 (2025 05:10) (18 - 18)  SpO2: 91% (2025 05:10) (91% - 94%)    Parameters below as of 2025 05:10  Patient On (Oxygen Delivery Method): nasal cannula  O2 Flow (L/min): 2      I&O's Summary    15 Nick 2025 07:01  -  2025 07:00  --------------------------------------------------------  IN: 0 mL / OUT: 700 mL / NET: -700 mL          PHYSICAL EXAM:  TELE: not on tele   Constitutional: NAD, awake and alert, well-developed  HEENT: Moist Mucous Membranes, Anicteric  Pulmonary: Non-labored, breath sounds coarse   Cardiovascular: Regular, S1 and S2 nl,+ murmur   Gastrointestinal: Bowel Sounds present, soft, nontender.   Lymph: No lymphadenopathy. No peripheral edema.  Skin: No visible rashes or ulcers.  Psych:  Mood & affect appropriate    LABS: All Labs Reviewed:                        9.6    16.85 )-----------( 306      ( 2025 06:10 )             28.6                         9.1    14.51 )-----------( 232      ( 15 Nick 2025 06:50 )             26.7                         7.3    18.77 )-----------( 241      ( 2025 08:13 )             23.0     2025 06:10    143    |  106    |  30     ----------------------------<  106    4.0     |  30     |  0.99   15 Nick 2025 06:50    141    |  106    |  31     ----------------------------<  108    3.8     |  30     |  0.96   2025 08:13    140    |  104    |  34     ----------------------------<  114    4.2     |  28     |  1.00     Ca    9.0        2025 06:10  Ca    8.7        15 Nick 2025 06:50  Ca    8.7        2025 08:13  Phos  2.5       2025 06:10  Phos  2.6       15 Nick 2025 06:50  Mg     1.5       2025 06:10    TPro  5.5    /  Alb  2.8    /  TBili  0.9    /  DBili  x      /  AST  6      /  ALT  27     /  AlkPhos  40     15 Nick 2025 06:50    PT/INR - ( 15 Nick 2025 06:50 )   PT: 15.0 sec;   INR: 1.27 ratio                  EC Lead ECG:   Ventricular Rate 80 BPM    Atrial Rate 80 BPM    P-R Interval 132 ms    QRS Duration 86 ms    Q-T Interval 398 ms    QTC Calculation(Bazett) 459 ms    P Axis 75 degrees    R Axis -35 degrees    T Axis 78 degrees    Diagnosis Line Sinus rhythm with premature supraventricular complexes  Left axis deviation  Possible Lateral infarct , age undetermined  Abnormal ECG  When compared with ECG of 2025 22:27,  premature ventricular complexes are no longer present  Confirmed by ROSALVA GOODRICH ([id number]) on 2025 9:50:39 PM (25 @ 10:58)      TRANSTHORACIC ECHOCARDIOGRAM REPORT  ________________________________________________________________________________                                      _______       Pt. Name:       MARTINEZ JONES Study Date:    2025  MRN:            [id number]        YOB: 1940  Accession #:    [id number]       Age:           84 years  Account#:       [id number]      Gender:        F  Heart Rate:                     Height:        62.99 in (160.00 cm)  Rhythm:                         Weight:        147.71 lb (67.00 kg)  Blood Pressure: 109/63 mmHg     BSA/BMI:       1.70 m² / 26.17 kg/m²  ________________________________________________________________________________________  Referring Physician:    [id number] Naresh Vitale  Interpreting Physician: Daniel Jorge M.D.  Primary Sonographer:    Jamal Hayes    CPT:               ECHO TTE WO CON COMP W DOPP - 72696.m  Indication(s):     Shock, unspecified - R57.9  Procedure:         Transthoracic echocardiogram with 2-D, M-mode and complete                     spectral and color flow Doppler.  Ordering Location: ICU1  Admission Status:  Inpatient  Study Information: Image quality for this study is technically difficult.    _______________________________________________________________________________________     CONCLUSIONS:      1. Technically difficult image quality.   2. Left ventricular systolic function is normal with an ejection fraction of 67 % by Arias's method of disks.   3. Mild left ventricular hypertrophy.   4.Normal right ventricular cavity size and probably normal right ventricular systolic function.   5. Tricuspid aortic valve with normal leaflet excursion. There is calcification of the aortic valve leaflets.   6. Moderate mitral valve leaflet calcification.   7. Mild mitral regurgitation.   8. Moderate tricuspid regurgitation.   9. The inferior vena cava is normal in size measuring 1.64 cm in diameter, (normal <2.1cm) with normal inspiratory collapse (normal >50%) consistent with normal right atrial pressure (~3, range 0-5mmHg).  10. Estimated pulmonary artery systolic pressure is 57 mmHg, consistent with moderate-to-severe pulmonary hypertension.  11. Trace pericardial effusion.  12. Right pleural effusion noted.  13. Compared to the transthoracic echocardiogram performed on 2025, there have been no significant interval changes.    ________________________________________________________________________________________  FINDINGS:     Left Ventricle:  Left ventricular systolic function is normal with a calculated ejection fraction of 67 % by the Arias's biplane method of disks. Mild left ventricular hypertrophy.     Right Ventricle:  The right ventricular cavity is normal in size and right ventricular systolic function is probablynormal. Tricuspid annular plane systolic excursion (TAPSE) is 1.9 cm (normal >=1.7 cm).     Left Atrium:  The left atrium is normal in size with an indexed volume of 29.06 ml/m².     Right Atrium:  The right atrium is normal in size with an indexed volume of 26.00 ml/m² and an indexed area of 9.41 cm²/m².     Interatrial Septum:  The interatrial septum appears intact.     Aortic Valve:  The aortic valve is tricuspid with normal leaflet excursion. There is calcification of the aortic valve leaflets. There is mild thickening of the aortic valve leaflets. There is trace aortic regurgitation.     Mitral Valve:  Structurally normal mitral valve with normal leaflet excursion. There is calcification of the mitral valve annulus. There is moderate leaflet calcification. There is mild mitral regurgitation.     Tricuspid Valve:  The tricuspid valve is structurally normal with normal leaflet excursion. There is moderate tricuspid regurgitation. Estimated pulmonary artery systolic pressure is 57 mmHg,consistent with moderate-to-severe pulmonary hypertension.     Pulmonic Valve:  The pulmonic valve was not well visualized. There is trace pulmonic regurgitation.     Aorta:  The aortic root at the sinuses of Valsalva is normal in size, measuring 3.30 cm (indexed 1.94 cm/m²). The ascending aorta is normal in size, measuring 3.00 cm (indexed 1.76 cm/m²). The aortic arch diameter is normal in size, measuring 2.4 cm (indexed 1.41 cm/m²).     Pericardium:  There is a trace pericardial effusion.     Pleura:  Right pleural effusion noted.     Systemic Veins:  The inferior vena cava is normal in size measuring 1.64 cm in diameter, (normal <2.1cm) with normal inspiratory collapse (normal >50%) consistent with normal right atrial pressure (~3, range 0-5mmHg).  ____________________________________________________________________  QUANTITATIVE DATA:  Left Ventricle Measurements: (Indexed to BSA)     IVSd (2D):   1.2 cm  LVPWd (2D):  1.1 cm  LVIDd (2D):  4.2 cm  LVIDs (2D):  2.4 cm  LV Mass:     169 g  99.5 g/m²  LV Vol d, MOD A2C: 38.9 ml 22.88 ml/m²  LV Vol d, MOD A4C: 40.9 ml 24.06 ml/m²  LV Vol d, MOD BP:  40.9 ml 24.04 ml/m²  LV Vol s, MOD A2C: 11.3 ml 6.65 ml/m²  LV Vol s, MOD A4C: 13.4 ml 7.88 ml/m²  LV Vol s, MOD BP:  13.4 ml 7.91 ml/m²  LVOT SV MOD BP:    27.4 ml  LV EF% MOD BP:     67 %     MV E Vmax:    1.54 m/s  MV A Vmax:    0.74 m/s  MV E/A:       2.08  e' lateral:   11.90 cm/s  e' medial:    8.81 cm/s  E/e' lateral: 12.94  E/e' medial:  17.48  E/e' Average: 14.87  MV DT:272 msec    Aorta Measurements: (Normal range) (Indexed to BSA)     Ao Root d     3.30 cm (2.7 - 3.3 cm) 1.94 cm/m²  Ao Asc d, 2D: 3.00  Ao Asc prox:  3.00 cm                1.76 cm/m²  Ao Arch:      2.4 cm            Left Atrium Measurements: (Indexed to BSA)  LA Diam 2D: 3.60 cm         Right Ventricle Measurements: Right Atrial Measurements:     TAPSE:            1.9 cm      RA Vol s, MOD A4C         44.2 ml  RV Base (RVID1):  2.9 cm      RA Vol s, MOD A4C i BSA   26.00 ml/m²  RV Mid (RVID2): 2.5 cm      RA Area s, MOD A4C        16.0 cm²  RV Major (RVID3): 6.4 cm      RA Area s, MOD A4C, i BSA 9.41 cm²/m²       LVOT / RVOT/ Qp/Qs Data: (Indexed to BSA)  LVOT Diameter,s: 2.20 cm  LVOT Area:       3.80 cm²    Mitral Valve Measurements:     MV E Vmax: 1.5 m/s         MR Vmax:          3.80 m/s  MV A Vmax: 0.7 m/s         MR Peak Gradient: 57.8 mmHg  MV E/A:    2.1       Tricuspid Valve Measurements:     TR Vmax:          3.7 m/s  TR Peak Gradient: 54.5 mmHg  RA Pressure:      3 mmHg  PASP:             57 mmHg    ________________________________________________________________________________________  Electronically signed on 2025 at 11:19:51 AM by Daniel Jorge M.D.         *** Final ***      Radiology:         [facility] -- Sai Valle Pannella, Patel, Savella Goodger, Cohen  Office # [phone]      Follow Up:  Hypotension     Subjective/Observations:   No events overnight resting comfortably in bed.  No complaints of chest pain, dyspnea, or palpitations reported. No signs of orthopnea or PND.  Remains on NC, for bronch today     REVIEW OF SYSTEMS: All other review of systems is negative unless indicated above    PAST MEDICAL & SURGICAL HISTORY:  COPD (chronic obstructive pulmonary disease)      DM (diabetes mellitus)  diet-controlled      PAF (paroxysmal atrial fibrillation)  s/p ablation      Hiatal hernia      H/O aplastic anemia      History of IBS      H/O osteoporosis      HLD (hyperlipidemia)      PMR (polymyalgia rheumatica)      History of basal cell cancer      Chronic kidney disease (CKD)      Hypotension      Presence of IVC filter      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      History of immunodeficiency      Lumbar compression fracture      H/O hiatal hernia      H/O pulmonary fibrosis      H/O sinus bradycardia      History of fracture of right hip  "unoperable"      S/P hysterectomy      S/P foot surgery      S/P knee surgery      After cataract  bilateral eye cataract surgically removed      Closed right hip fracture  rigth hip ORIF 2016      S/P IVC filter  2016      S/P carpal tunnel release  Right  & 17      H/O kyphoplasty      Port-A-Cath in place  right chest          MEDICATIONS  (STANDING):  albuterol/ipratropium for Nebulization 3 milliLiter(s) Nebulizer every 6 hours  aMIOdarone    Tablet 100 milliGRAM(s) Oral daily  chlorhexidine 2% Cloths 1 Application(s) Topical <User Schedule>  dextrose 5%. 1000 milliLiter(s) (100 mL/Hr) IV Continuous <Continuous>  dextrose 5%. 1000 milliLiter(s) (50 mL/Hr) IV Continuous <Continuous>  dextrose 50% Injectable 25 Gram(s) IV Push once  dextrose 50% Injectable 12.5 Gram(s) IV Push once  dextrose 50% Injectable 25 Gram(s) IV Push once  droxidopa 100 milliGRAM(s) Oral every 8 hours  fluticasone propionate/ salmeterol 500-50 MICROgram(s) Diskus 1 Dose(s) Inhalation two times a day  gabapentin 400 milliGRAM(s) Oral every 12 hours  glucagon  Injectable 1 milliGRAM(s) IntraMuscular once  hydrocortisone sodium succinate Injectable 25 milliGRAM(s) IV Push daily  insulin lispro (ADMELOG) corrective regimen sliding scale   SubCutaneous three times a day before meals  insulin lispro (ADMELOG) corrective regimen sliding scale   SubCutaneous at bedtime  midodrine 10 milliGRAM(s) Oral every 8 hours  montelukast 10 milliGRAM(s) Oral daily  pantoprazole  Injectable 40 milliGRAM(s) IV Push daily  piperacillin/tazobactam IVPB.. 3.375 Gram(s) IV Intermittent every 8 hours  sodium chloride 0.45%. 1000 milliLiter(s) (50 mL/Hr) IV Continuous <Continuous>  tiotropium 2.5 MICROgram(s) Inhaler 2 Puff(s) Inhalation daily    MEDICATIONS  (PRN):  acetaminophen     Tablet .. 650 milliGRAM(s) Oral every 6 hours PRN Mild Pain (1 - 3)  dextrose Oral Gel 15 Gram(s) Oral once PRN Blood Glucose LESS THAN 70 milliGRAM(s)/deciliter      Allergies    No Known Allergies    Intolerances        Vital Signs Last 24 Hrs  T(C): 37.1 (2025 05:10), Max: 37.1 (15 Nick 2025 20:40)  T(F): 98.8 (2025 05:10), Max: 98.8 (15 Nick 2025 20:40)  HR: 86 (2025 05:10) (70 - 86)  BP: 149/77 (2025 05:10) (110/57 - 149/77)  BP(mean): --  RR: 18 (2025 05:10) (18 - 18)  SpO2: 91% (2025 05:10) (91% - 94%)    Parameters below as of 2025 05:10  Patient On (Oxygen Delivery Method): nasal cannula  O2 Flow (L/min): 2      I&O's Summary    15 Nick 2025 07:01  -  2025 07:00  --------------------------------------------------------  IN: 0 mL / OUT: 700 mL / NET: -700 mL          PHYSICAL EXAM:  TELE: not on tele   Constitutional: NAD, awake and alert, well-developed  HEENT: Moist Mucous Membranes, Anicteric  Pulmonary: Non-labored, breath sounds coarse   Cardiovascular: Regular, S1 and S2 nl,+ murmur   Gastrointestinal: Bowel Sounds present, soft, nontender.   Lymph: +peripheral edema. ,arm edema   Skin: No visible rashes or ulcers. Left ACW port   Psych:  Mood & affect appropriate    LABS: All Labs Reviewed:                        9.6    16.85 )-----------( 306      ( 2025 06:10 )             28.6                         9.1    14.51 )-----------( 232      ( 15 Nick 2025 06:50 )             26.7                         7.3    18.77 )-----------( 241      ( 2025 08:13 )             23.0     2025 06:10    143    |  106    |  30     ----------------------------<  106    4.0     |  30     |  0.99   15 Nick 2025 06:50    141    |  106    |  31     ----------------------------<  108    3.8     |  30     |  0.96   2025 08:13    140    |  104    |  34     ----------------------------<  114    4.2     |  28     |  1.00     Ca    9.0        2025 06:10  Ca    8.7        15 Nick 2025 06:50  Ca    8.7        2025 08:13  Phos  2.5       2025 06:10  Phos  2.6       15 Nick 2025 06:50  Mg     1.5       2025 06:10    TPro  5.5    /  Alb  2.8    /  TBili  0.9    /  DBili  x      /  AST  6      /  ALT  27     /  AlkPhos  40     15 Nick 2025 06:50    PT/INR - ( 15 Nick 2025 06:50 )   PT: 15.0 sec;   INR: 1.27 ratio                  EC Lead ECG:   Ventricular Rate 80 BPM    Atrial Rate 80 BPM    P-R Interval 132 ms    QRS Duration 86 ms    Q-T Interval 398 ms    QTC Calculation(Bazett) 459 ms    P Axis 75 degrees    R Axis -35 degrees    T Axis 78 degrees    Diagnosis Line Sinus rhythm with premature supraventricular complexes  Left axis deviation  Possible Lateral infarct , age undetermined  Abnormal ECG  When compared with ECG of 2025 22:27,  premature ventricular complexes are no longer present  Confirmed by ROSALVA GOODRICH ([id number]) on 2025 9:50:39 PM (25 @ 10:58)      TRANSTHORACIC ECHOCARDIOGRAM REPORT  ________________________________________________________________________________                                      _______       Pt. Name:       MARTINEZ JONES Study Date:    2025  MRN:            [id number]        YOB: 1940  Accession #:    [id number]       Age:           84 years  Account#:       [id number]      Gender:        F  Heart Rate:                     Height:        62.99 in (160.00 cm)  Rhythm:                         Weight:        147.71 lb (67.00 kg)  Blood Pressure: 109/63 mmHg     BSA/BMI:       1.70 m² / 26.17 kg/m²  ________________________________________________________________________________________  Referring Physician:    [id number] Naresh Vitale  Interpreting Physician: Daniel Jorge M.D.  Primary Sonographer:    Jamal Hayes    CPT:               ECHO TTE WO CON COMP W DOPP - 21599.m  Indication(s):     Shock, unspecified - R57.9  Procedure:         Transthoracic echocardiogram with 2-D, M-mode and complete                     spectral and color flow Doppler.  Ordering Location: ICU1  Admission Status:  Inpatient  Study Information: Image quality for this study is technically difficult.    _______________________________________________________________________________________     CONCLUSIONS:      1. Technically difficult image quality.   2. Left ventricular systolic function is normal with an ejection fraction of 67 % by Arias's method of disks.   3. Mild left ventricular hypertrophy.   4.Normal right ventricular cavity size and probably normal right ventricular systolic function.   5. Tricuspid aortic valve with normal leaflet excursion. There is calcification of the aortic valve leaflets.   6. Moderate mitral valve leaflet calcification.   7. Mild mitral regurgitation.   8. Moderate tricuspid regurgitation.   9. The inferior vena cava is normal in size measuring 1.64 cm in diameter, (normal <2.1cm) with normal inspiratory collapse (normal >50%) consistent with normal right atrial pressure (~3, range 0-5mmHg).  10. Estimated pulmonary artery systolic pressure is 57 mmHg, consistent with moderate-to-severe pulmonary hypertension.  11. Trace pericardial effusion.  12. Right pleural effusion noted.  13. Compared to the transthoracic echocardiogram performed on 2025, there have been no significant interval changes.    ________________________________________________________________________________________  FINDINGS:     Left Ventricle:  Left ventricular systolic function is normal with a calculated ejection fraction of 67 % by the Arias's biplane method of disks. Mild left ventricular hypertrophy.     Right Ventricle:  The right ventricular cavity is normal in size and right ventricular systolic function is probablynormal. Tricuspid annular plane systolic excursion (TAPSE) is 1.9 cm (normal >=1.7 cm).     Left Atrium:  The left atrium is normal in size with an indexed volume of 29.06 ml/m².     Right Atrium:  The right atrium is normal in size with an indexed volume of 26.00 ml/m² and an indexed area of 9.41 cm²/m².     Interatrial Septum:  The interatrial septum appears intact.     Aortic Valve:  The aortic valve is tricuspid with normal leaflet excursion. There is calcification of the aortic valve leaflets. There is mild thickening of the aortic valve leaflets. There is trace aortic regurgitation.     Mitral Valve:  Structurally normal mitral valve with normal leaflet excursion. There is calcification of the mitral valve annulus. There is moderate leaflet calcification. There is mild mitral regurgitation.     Tricuspid Valve:  The tricuspid valve is structurally normal with normal leaflet excursion. There is moderate tricuspid regurgitation. Estimated pulmonary artery systolic pressure is 57 mmHg,consistent with moderate-to-severe pulmonary hypertension.     Pulmonic Valve:  The pulmonic valve was not well visualized. There is trace pulmonic regurgitation.     Aorta:  The aortic root at the sinuses of Valsalva is normal in size, measuring 3.30 cm (indexed 1.94 cm/m²). The ascending aorta is normal in size, measuring 3.00 cm (indexed 1.76 cm/m²). The aortic arch diameter is normal in size, measuring 2.4 cm (indexed 1.41 cm/m²).     Pericardium:  There is a trace pericardial effusion.     Pleura:  Right pleural effusion noted.     Systemic Veins:  The inferior vena cava is normal in size measuring 1.64 cm in diameter, (normal <2.1cm) with normal inspiratory collapse (normal >50%) consistent with normal right atrial pressure (~3, range 0-5mmHg).  ____________________________________________________________________  QUANTITATIVE DATA:  Left Ventricle Measurements: (Indexed to BSA)     IVSd (2D):   1.2 cm  LVPWd (2D):  1.1 cm  LVIDd (2D):  4.2 cm  LVIDs (2D):  2.4 cm  LV Mass:     169 g  99.5 g/m²  LV Vol d, MOD A2C: 38.9 ml 22.88 ml/m²  LV Vol d, MOD A4C: 40.9 ml 24.06 ml/m²  LV Vol d, MOD BP:  40.9 ml 24.04 ml/m²  LV Vol s, MOD A2C: 11.3 ml 6.65 ml/m²  LV Vol s, MOD A4C: 13.4 ml 7.88 ml/m²  LV Vol s, MOD BP:  13.4 ml 7.91 ml/m²  LVOT SV MOD BP:    27.4 ml  LV EF% MOD BP:     67 %     MV E Vmax:    1.54 m/s  MV A Vmax:    0.74 m/s  MV E/A:       2.08  e' lateral:   11.90 cm/s  e' medial:    8.81 cm/s  E/e' lateral: 12.94  E/e' medial:  17.48  E/e' Average: 14.87  MV DT:272 msec    Aorta Measurements: (Normal range) (Indexed to BSA)     Ao Root d     3.30 cm (2.7 - 3.3 cm) 1.94 cm/m²  Ao Asc d, 2D: 3.00  Ao Asc prox:  3.00 cm                1.76 cm/m²  Ao Arch:      2.4 cm            Left Atrium Measurements: (Indexed to BSA)  LA Diam 2D: 3.60 cm         Right Ventricle Measurements: Right Atrial Measurements:     TAPSE:            1.9 cm      RA Vol s, MOD A4C         44.2 ml  RV Base (RVID1):  2.9 cm      RA Vol s, MOD A4C i BSA   26.00 ml/m²  RV Mid (RVID2): 2.5 cm      RA Area s, MOD A4C        16.0 cm²  RV Major (RVID3): 6.4 cm      RA Area s, MOD A4C, i BSA 9.41 cm²/m²       LVOT / RVOT/ Qp/Qs Data: (Indexed to BSA)  LVOT Diameter,s: 2.20 cm  LVOT Area:       3.80 cm²    Mitral Valve Measurements:     MV E Vmax: 1.5 m/s         MR Vmax:          3.80 m/s  MV A Vmax: 0.7 m/s         MR Peak Gradient: 57.8 mmHg  MV E/A:    2.1       Tricuspid Valve Measurements:     TR Vmax:          3.7 m/s  TR Peak Gradient: 54.5 mmHg  RA Pressure:      3 mmHg  PASP:             57 mmHg    ________________________________________________________________________________________  Electronically signed on 2025 at 11:19:51 AM by Daniel Jorge M.D.         *** Final ***      Radiology:

## 2025-06-16 NOTE — PROGRESS NOTE ADULT - ASSESSMENT
84 yr old female with PMHx afib on eliquis, COPD (not on home O2), PE/Rt lower extremity DVT 2017, Rt LE IVC filter 2017 CKD3, aplastic anemia, polymyalgia rheumatica on prednisone 5 mg po qd, requiring PRBC transfusions ~8 weeks (via Rt subclavian mediport), AVN bilat hips, HTN, HLD, HFpEF (75% 6.2.25), diastolic dysfx grade1, recent hospitalization 5/25/25-6/5/25 for AHDF/COPD exacerbation, pt also with hx of IVC filter, had Rt subclavian mediport, who was admitted on 6/8 with c/o Rt sided chest pain. general malaise. Pt stated began to feel ill evening before presentation, daughter this a.m. endorsed pt lethargic, pale. Patient was initially admitted in the ICU for hypotension requiring pressors. She is transferred out of the ICU. She still reports some R sided chest pain and SOB. Reports cough which started when she arrived in the hospital. Has some blood tinged sputum.    ID consulted for pneumonia. CT chest showed new multifocal infection predominantly involving the right upper lobe. Suspect respiratory symptoms multifactorial in the setting of CHF, anemia. Blood cultures remain no growth. COVID/FLU/RSV negative. MRSA nasal PCR, urine strep and legionella antigen negative. Respiratory culture grew commensal marilee consistent with body site. Serum Fungitell low.    S/p bronchoscopy today. Had oozing blood from RUL but no endobronchial lesions noted.    #R sided pneumonia  #Hemoptysis  #Acute hypoxic respiratory failure  #Leukocytosis    -continue Zosyn pending BAL cultures  -BAL bacterial, fungal and AFB cultures pending  -Quantiferon, serum galactomannan pending  -follow cultures to completion  -discussed with Dr. Sahil Velazco MD  Division of Infectious Diseases   Cell [phone] between 8am and 6pm   After 6pm and weekends please call ID service at [phone].     35 minutes spent on total encounter assessing patient, examination, chart review, counseling and coordinating care by the attending physician/nurse/care manager.

## 2025-06-16 NOTE — DIETITIAN INITIAL EVALUATION ADULT - OTHER INFO
84F h/o AFib on Eliquis, COPD, CKD, aplastic anemia, polymyalgia rheumatica on chronic steroids, avascular necrosis of hips, HTN, HLD, with recent admission to [place] 5/26 - 6/5 for acute HFpEF exacerbation and COPD exacerbation, discharged to rehab on 6/5 and returning today with right sided chest pain, general malaise and feeling unwell. She reports some slight cough though otherwise no other new symptoms reported. Found to be hypotensive, febrile w/ leukocytosis. Admitted to ICU for septic shock likely 2/2 to PNA and KIMBERLY. Now stable for downgrade to tele.  ·  Problem: History of hemoptysis this admit. patient for bronch today  speech evaluation active but unable to complete today 2/2 patient NPO  Mg 1.5 supplemented  IV NS 50 ml hr

## 2025-06-16 NOTE — PROGRESS NOTE ADULT - SUBJECTIVE AND OBJECTIVE BOX
Patient is a 84y old  Female who presents with a chief complaint of septic shock, PNA (16 Jun 2025 07:54)    INTERVAL HPI/OVERNIGHT EVENTS: 8 beats of possible Vtach (around 8AM), seen patient by bedside. Patient denies any chest pain, palpitations, SOB.   Pt seen and examined at the bedside-- just endorses fatigue. Also continues to endorse hemoptysis. Scheduled bronch today. Patient is medically stable for planned bronch.     MEDICATIONS  (STANDING):  albuterol/ipratropium for Nebulization 3 milliLiter(s) Nebulizer every 6 hours  aMIOdarone    Tablet 100 milliGRAM(s) Oral daily  chlorhexidine 2% Cloths 1 Application(s) Topical <User Schedule>  dextrose 5%. 1000 milliLiter(s) (100 mL/Hr) IV Continuous <Continuous>  dextrose 5%. 1000 milliLiter(s) (50 mL/Hr) IV Continuous <Continuous>  dextrose 50% Injectable 25 Gram(s) IV Push once  dextrose 50% Injectable 12.5 Gram(s) IV Push once  dextrose 50% Injectable 25 Gram(s) IV Push once  droxidopa 100 milliGRAM(s) Oral every 8 hours  fluticasone propionate/ salmeterol 500-50 MICROgram(s) Diskus 1 Dose(s) Inhalation two times a day  gabapentin 400 milliGRAM(s) Oral every 12 hours  glucagon  Injectable 1 milliGRAM(s) IntraMuscular once  hydrocortisone sodium succinate Injectable 25 milliGRAM(s) IV Push daily  insulin lispro (ADMELOG) corrective regimen sliding scale   SubCutaneous three times a day before meals  insulin lispro (ADMELOG) corrective regimen sliding scale   SubCutaneous at bedtime  midodrine 10 milliGRAM(s) Oral every 8 hours  montelukast 10 milliGRAM(s) Oral daily  pantoprazole  Injectable 40 milliGRAM(s) IV Push daily  piperacillin/tazobactam IVPB.. 3.375 Gram(s) IV Intermittent every 8 hours  sodium chloride 0.45%. 1000 milliLiter(s) (50 mL/Hr) IV Continuous <Continuous>  tiotropium 2.5 MICROgram(s) Inhaler 2 Puff(s) Inhalation daily    MEDICATIONS  (PRN):  acetaminophen     Tablet .. 650 milliGRAM(s) Oral every 6 hours PRN Mild Pain (1 - 3)  dextrose Oral Gel 15 Gram(s) Oral once PRN Blood Glucose LESS THAN 70 milliGRAM(s)/deciliter      Allergies    No Known Allergies    Intolerances        REVIEW OF SYSTEMS: Denies other than mentioned in HPI.     Vital Signs Last 24 Hrs  T(C): 37.1 (16 Jun 2025 05:10), Max: 37.1 (15 Nick 2025 20:40)  T(F): 98.8 (16 Jun 2025 05:10), Max: 98.8 (15 Nick 2025 20:40)  HR: 90 (16 Jun 2025 07:48) (70 - 91)  BP: 149/77 (16 Jun 2025 05:10) (110/57 - 149/77)  BP(mean): --  RR: 18 (16 Jun 2025 05:10) (18 - 18)  SpO2: 92% (16 Jun 2025 07:48) (91% - 94%)    Parameters below as of 16 Jun 2025 07:48  Patient On (Oxygen Delivery Method): nasal cannula, 3L        PHYSICAL EXAM:  GENERAL: NAD  CHEST/LUNG:  CTA b/l, decreased breath sounds in lower regions.   HEART:  irregularly irregular--------------  ABDOMEN:  BS+, soft, nontender, nondistended  EXTREMITIES: no edema, cyanosis, or calf tenderness  NERVOUS SYSTEM: answers questions and follows commands appropriately    LABS:                        9.6    16.85 )-----------( 306      ( 16 Jun 2025 06:10 )             28.6     CBC Full  -  ( 16 Jun 2025 06:10 )  WBC Count : 16.85 K/uL  Hemoglobin : 9.6 g/dL  Hematocrit : 28.6 %  Platelet Count - Automated : 306 K/uL  Mean Cell Volume : 92.6 fl  Mean Cell Hemoglobin : 31.1 pg  Mean Cell Hemoglobin Concentration : 33.6 g/dL  Auto Neutrophil # : x  Auto Lymphocyte # : x  Auto Monocyte # : x  Auto Eosinophil # : x  Auto Basophil # : x  Auto Neutrophil % : x  Auto Lymphocyte % : x  Auto Monocyte % : x  Auto Eosinophil % : x  Auto Basophil % : x    16 Jun 2025 06:10    143    |  106    |  30     ----------------------------<  106    4.0     |  30     |  0.99     Ca    9.0        16 Jun 2025 06:10  Phos  2.5       16 Jun 2025 06:10  Mg     1.5       16 Jun 2025 06:10      PT/INR - ( 15 Nick 2025 06:50 )   PT: 15.0 sec;   INR: 1.27 ratio           Urinalysis Basic - ( 16 Jun 2025 06:10 )    Color: x / Appearance: x / SG: x / pH: x  Gluc: 106 mg/dL / Ketone: x  / Bili: x / Urobili: x   Blood: x / Protein: x / Nitrite: x   Leuk Esterase: x / RBC: x / WBC x   Sq Epi: x / Non Sq Epi: x / Bacteria: x      CAPILLARY BLOOD GLUCOSE      POCT Blood Glucose.: 123 mg/dL (16 Jun 2025 07:58)  POCT Blood Glucose.: 117 mg/dL (15 Nick 2025 21:47)  POCT Blood Glucose.: 125 mg/dL (15 Nick 2025 16:37)  POCT Blood Glucose.: 185 mg/dL (15 Nick 2025 12:17)          RADIOLOGY & ADDITIONAL TESTS:  Personally reviewed.     Consultant(s) Notes Reviewed:  [x] YES  [ ] NO Patient is a 84y old  Female who presents with a chief complaint of septic shock, PNA (16 Jun 2025 07:54)    INTERVAL HPI/OVERNIGHT EVENTS: 8 beats of possible Vtach (around 8AM), seen patient by bedside. Patient denies any chest pain, palpitations, SOB.   Pt seen and examined at the bedside-- just endorses fatigue. Also continues to endorse hemoptysis. Scheduled bronch today. Patient is medically stable for planned bronch.     MEDICATIONS  (STANDING):  albuterol/ipratropium for Nebulization 3 milliLiter(s) Nebulizer every 6 hours  aMIOdarone    Tablet 100 milliGRAM(s) Oral daily  chlorhexidine 2% Cloths 1 Application(s) Topical <User Schedule>  dextrose 5%. 1000 milliLiter(s) (100 mL/Hr) IV Continuous <Continuous>  dextrose 5%. 1000 milliLiter(s) (50 mL/Hr) IV Continuous <Continuous>  dextrose 50% Injectable 25 Gram(s) IV Push once  dextrose 50% Injectable 12.5 Gram(s) IV Push once  dextrose 50% Injectable 25 Gram(s) IV Push once  droxidopa 100 milliGRAM(s) Oral every 8 hours  fluticasone propionate/ salmeterol 500-50 MICROgram(s) Diskus 1 Dose(s) Inhalation two times a day  gabapentin 400 milliGRAM(s) Oral every 12 hours  glucagon  Injectable 1 milliGRAM(s) IntraMuscular once  hydrocortisone sodium succinate Injectable 25 milliGRAM(s) IV Push daily  insulin lispro (ADMELOG) corrective regimen sliding scale   SubCutaneous three times a day before meals  insulin lispro (ADMELOG) corrective regimen sliding scale   SubCutaneous at bedtime  midodrine 10 milliGRAM(s) Oral every 8 hours  montelukast 10 milliGRAM(s) Oral daily  pantoprazole  Injectable 40 milliGRAM(s) IV Push daily  piperacillin/tazobactam IVPB.. 3.375 Gram(s) IV Intermittent every 8 hours  sodium chloride 0.45%. 1000 milliLiter(s) (50 mL/Hr) IV Continuous <Continuous>  tiotropium 2.5 MICROgram(s) Inhaler 2 Puff(s) Inhalation daily    MEDICATIONS  (PRN):  acetaminophen     Tablet .. 650 milliGRAM(s) Oral every 6 hours PRN Mild Pain (1 - 3)  dextrose Oral Gel 15 Gram(s) Oral once PRN Blood Glucose LESS THAN 70 milliGRAM(s)/deciliter      Allergies    No Known Allergies    Intolerances        REVIEW OF SYSTEMS: Denies other than mentioned in HPI.     Vital Signs Last 24 Hrs  T(C): 37.1 (16 Jun 2025 05:10), Max: 37.1 (15 Nick 2025 20:40)  T(F): 98.8 (16 Jun 2025 05:10), Max: 98.8 (15 Nick 2025 20:40)  HR: 90 (16 Jun 2025 07:48) (70 - 91)  BP: 149/77 (16 Jun 2025 05:10) (110/57 - 149/77)  BP(mean): --  RR: 18 (16 Jun 2025 05:10) (18 - 18)  SpO2: 92% (16 Jun 2025 07:48) (91% - 94%)    Parameters below as of 16 Jun 2025 07:48  Patient On (Oxygen Delivery Method): nasal cannula, 3L        PHYSICAL EXAM:  GENERAL: NAD  CHEST/LUNG:  CTA b/l, decreased breath sounds in lower regions.   HEART:  slightly irregular, no overt murmurs noted   ABDOMEN:  BS+, soft, nontender, nondistended  EXTREMITIES: +1 peripheral edema noted b/l LE  NERVOUS SYSTEM: answers questions and follows commands appropriately    LABS:                        9.6    16.85 )-----------( 306      ( 16 Jun 2025 06:10 )             28.6     CBC Full  -  ( 16 Jun 2025 06:10 )  WBC Count : 16.85 K/uL  Hemoglobin : 9.6 g/dL  Hematocrit : 28.6 %  Platelet Count - Automated : 306 K/uL  Mean Cell Volume : 92.6 fl  Mean Cell Hemoglobin : 31.1 pg  Mean Cell Hemoglobin Concentration : 33.6 g/dL  Auto Neutrophil # : x  Auto Lymphocyte # : x  Auto Monocyte # : x  Auto Eosinophil # : x  Auto Basophil # : x  Auto Neutrophil % : x  Auto Lymphocyte % : x  Auto Monocyte % : x  Auto Eosinophil % : x  Auto Basophil % : x    16 Jun 2025 06:10    143    |  106    |  30     ----------------------------<  106    4.0     |  30     |  0.99     Ca    9.0        16 Jun 2025 06:10  Phos  2.5       16 Jun 2025 06:10  Mg     1.5       16 Jun 2025 06:10      PT/INR - ( 15 Nick 2025 06:50 )   PT: 15.0 sec;   INR: 1.27 ratio           Urinalysis Basic - ( 16 Jun 2025 06:10 )    Color: x / Appearance: x / SG: x / pH: x  Gluc: 106 mg/dL / Ketone: x  / Bili: x / Urobili: x   Blood: x / Protein: x / Nitrite: x   Leuk Esterase: x / RBC: x / WBC x   Sq Epi: x / Non Sq Epi: x / Bacteria: x      CAPILLARY BLOOD GLUCOSE      POCT Blood Glucose.: 123 mg/dL (16 Jun 2025 07:58)  POCT Blood Glucose.: 117 mg/dL (15 Nick 2025 21:47)  POCT Blood Glucose.: 125 mg/dL (15 Nick 2025 16:37)  POCT Blood Glucose.: 185 mg/dL (15 Nick 2025 12:17)          RADIOLOGY & ADDITIONAL TESTS:  Personally reviewed.     Consultant(s) Notes Reviewed:  [x] YES  [ ] NO

## 2025-06-16 NOTE — PROGRESS NOTE ADULT - SUBJECTIVE AND OBJECTIVE BOX
[facility]  INFECTIOUS DISEASES - [address], [place] [address]  Tel: [phone]     Fax: [phone]  =======================================================    MARTINEZ JONES [id number]    Follow up: No fevers. Seen after bronchoscopy. reports feeling tired.    Allergies:  No Known Allergies      Antibiotics:  magnesium sulfate  IVPB 2 Gram(s) IV Intermittent once       REVIEW OF SYSTEMS:  CONSTITUTIONAL:  No Fever or chills  HEENT:  No sore throat or runny nose.  CARDIOVASCULAR: + R sided chest pain  RESPIRATORY:  + cough, shortness of breath  GASTROINTESTINAL:  No nausea, vomiting or diarrhea.  GENITOURINARY:  No dysuria  NEUROLOGIC:  No headache or dizziness     Physical Exam:  ICU Vital Signs Last 24 Hrs  T(C): 36.6 (16 Jun 2025 14:00), Max: 37.4 (16 Jun 2025 12:22)  T(F): 97.8 (16 Jun 2025 14:00), Max: 99.3 (16 Jun 2025 12:22)  HR: 93 (16 Jun 2025 14:00) (70 - 104)  BP: 106/62 (16 Jun 2025 14:00) (106/62 - 151/75)  BP(mean): --  ABP: --  ABP(mean): --  RR: 20 (16 Jun 2025 14:00) (18 - 25)  SpO2: 92% (16 Jun 2025 14:00) (91% - 100%)    O2 Parameters below as of 16 Jun 2025 14:00  Patient On (Oxygen Delivery Method): nasal cannula  O2 Flow (L/min): 5         GEN: appears tired, not in acute distress  HEENT: normocephalic and atraumatic.   NECK: Supple.   LUNGS: Normal respiratory effort  HEART: Regular rate and rhythm   ABDOMEN: Soft, nontender, and nondistended.    EXTREMITIES: B/L Lower leg edema.  NEUROLOGIC: Answering questions appropriately  PSYCHIATRIC: Appropriate affect .    Labs:  06-16    143  |  106  |  30[H]  ----------------------------<  106[H]  4.0   |  30  |  0.99    Ca    9.0      16 Jun 2025 06:10  Phos  2.5     06-16  Mg     1.5     06-16    TPro  5.5[L]  /  Alb  2.8[L]  /  TBili  0.9  /  DBili  x   /  AST  6[L]  /  ALT  27  /  AlkPhos  40  06-15                          9.6    16.85 )-----------( 306      ( 16 Jun 2025 06:10 )             28.6     PT/INR - ( 15 Nick 2025 06:50 )   PT: 15.0 sec;   INR: 1.27 ratio           Urinalysis Basic - ( 16 Jun 2025 06:10 )    Color: x / Appearance: x / SG: x / pH: x  Gluc: 106 mg/dL / Ketone: x  / Bili: x / Urobili: x   Blood: x / Protein: x / Nitrite: x   Leuk Esterase: x / RBC: x / WBC x   Sq Epi: x / Non Sq Epi: x / Bacteria: x      LIVER FUNCTIONS - ( 15 Nick 2025 06:50 )  Alb: 2.8 g/dL / Pro: 5.5 g/dL / ALK PHOS: 40 U/L / ALT: 27 U/L / AST: 6 U/L / GGT: x             RECENT CULTURES:  06-08 @ 18:25 Sputum Sputum     Commensal marilee consistent with body site    Few Squamous epithelial cells per low power field  Few polymorphonuclear leukocytes per low power field  Few Gram Positive Cocci in Pairs and Chains per oil power field      06-08 @ 15:44 Clean Catch     <10,000 CFU/mL Normal Urogenital Marilee        06-08 @ 11:20 Blood Blood-Peripheral     No growth at 5 days        06-08 @ 11:15 Blood Blood-Peripheral     No growth at 5 days              All imaging and data are reviewed.

## 2025-06-16 NOTE — PROGRESS NOTE ADULT - PROBLEM SELECTOR PLAN 2
In the ED, tmax of 101 w/ KIMBERLY on CKD with Cr 1.90 (baseline 1.2-1.6), Hypotensive with SBP 80's (pt on midodrine therapy, usual 's). also w/ leukocytosis of 32.33. Procalcitonin of 13.6, RVP neg. Pt treated in ICU for septic shock requiring pressors, downgraded from the ICU to telemetry on 6/10  - CT chest 6/10 with multifocal R sided PNA  - continue zosyn; course dependent on bronch  - c/w solucortef 25ivp daily -- weaning per pulm  - F/U quant, fungitell (specimen received)   - MRSA/MSSA PCR negative  - trend WBC and fever curve, supplemental O2 prn to maintain SPO2 > 92% -- taper o2  - ID consulted Dr Juan A varma appreciated In the ED, tmax of 101 w/ KIMBERLY on CKD with Cr 1.90 (baseline 1.2-1.6), Hypotensive with SBP 80's (pt on midodrine therapy, usual 's). also w/ leukocytosis of 32.33. Procalcitonin of 13.6, RVP neg. Pt treated in ICU for septic shock requiring pressors, downgraded from the ICU to telemetry on 6/10  - CT chest 6/10 with multifocal R sided PNA  - continue zosyn; course dependent on bronch  - c/w solucortef 25ivp daily -- weaning per pulm  - F/U quant (negative), and fungitell 52 (negative)  - MRSA/MSSA PCR negative  - trend WBC and fever curve, supplemental O2 prn to maintain SPO2 > 92% -- taper o2  - ID consulted Dr Juan A varma appreciated

## 2025-06-16 NOTE — PROGRESS NOTE ADULT - ASSESSMENT
REASON FOR VISIT  .. Management of problems listed below      EVENTS/CURRENT ISSUES.  . 6/16/2025 fob done oozing rul and l lo lobe post basal   . 6/14/2025 iv ufh dced   . 6/13/2025 continues to have mild hemoptysis but is also on iv ufh drip   . 6/13/2025 dw pt re rbaa of bronch and se agrees scheduled for 6/16 10 in or   . 6/13/2025 dw cardio and id   . 6/11/2025 qft funfitell and galactomannan orderd   . 6/11/2025 pt wa sstarted on iv ufh for hemoptysis and pleuritic chest pain but was stopped when vq showed vlp   . 6/10/2025 Mild hemoptysis   . 6/9 tr out of icu  . 6/8/2025  . PNEUMONIA RML 6/8/2025  . SHOCK 6/8/2025   . NOREPI 6/8/2025   . STRESS STEROIDS   .... HYDROCORTISONE 6/8/2025  . A fib (chronic) POA 6/8/2025  . ANEMIA Hb 6/8/2025 Hb 7.5  . KIMBERLY ON CKD Cr 6/8/2025 Cr 1.9        REVIEW OF SYMPTOMS   Able to give ROS  Yes     RELIABILITY +/-   CONSTITUTIONAL Weakness Yes    ENDOCRINE  No heat or cold intolerance    ALLERGY No hives  Sore throat No stridor  RESP Shortness of breath YES   NEURO New weakness No   CARDIAC   Palpitations No         PHYSICAL EXAM    HEENT Unremarkable  atraumatic   RESP Fair air entry  Harsh breath sound   CARDIAC S1 S2 No S3     NO JVD    ABDOMEN No hepatosplenomegaly   PEDAL EDEMA present No calf tenderness    REASON FOR VISIT  .. Management of problems listed below      CC.   . 6/8/2025 brought in by ems for right sided chest pain that started at 3am- nonradiating- patient was offered aspirin by ems- patient refused and stated to ems that she is on plavix and was advised not to take aspirin. patient on 43 litres nasl cannula-     OVERALL PRESENTATION.  . 6/8/2025 84 yr old female with PMHx afib on eliquis, COPD (not on home O2), PE/Rt lower extremity DVT 2017, Rt LE IVC filter 2017 CKD3, aplastic anemia, polymyalgia rheumatica on prednisone 5 mg po qd, requiring PRBC transfusions ~8 weeks (via Rt subclavian mediport), AVN bilat hips, HTN, HLD, HFpEF (75% 6.2.25), diastolic dysfx grade1, recent hospitalization 5/25/25-6/5/25 for ADHF/COPD exacerbation, Pt with eventual improvement and transfer to [facility] facility from where she presents to E.D. this a.m. 6/8/25 with c/o Rt sided chest pain. general malaise. Pt stated began to feel ill evening before presentation, daughter this a.m. endorsed pt lethargic, pale.     In E.D. Pt found to have fever with tmax of 101, KIMBERLY on CKD with sCr 1.90 (baseline 1.2-1.6), HYPOtnsive with SBP 80's (pt on midodrine therapy, usual 's). In addition found to have leukocytosis of 37.6 (baseline 5.25 6/5/25), procalcitonin of 13.6, Hgb 7.5, elevated INR 2.27,  Chest xray demonstrated RML consolidations, concerning for pneum. In E.D. pt receiving 500 cc's NS, Vanco 1 gm, zosyn. Pt received tylenol 1 gm IV x1.     PMH.        BEST PRACTICE ISSUES.  . HOB ELEVATN.    .... Yes  . DIET  .   .... DASH 6/8/2025   . FREE WATER.   ....   .  IV FLUID.  .... 6/15/2025 1/2 ns 50   ..... 6/8 lr 40 x 12 h   . PHARMAC DVT PPLX .    .... 6/12-6/14/2025 iv ufh (hospitalist)  .... 6/11/2025 [facility] 5k.2   .... 6/10/2025 4:46 PM iv ufh   .... [facility] 6/9-6/10/2025   . NON PHARMAC DVT PPLX .      . STRESS ULCR PPLX .   ....   . DATE/DM MGMT.   ..... See under Endocrine section   GENERAL DATA .   . COVID.         .... scv2 6/8/2025 scv2 (-)   . GOC.    ....    . ICU STAY.    .... 6/8-6/9   . INFECTION PPLX .   .... CHLORHEXIDINE 2% 6/8/2025   . ALLGY.   ....  nka  . WT.   .... 6/8/2025 69   . BMI.  ....6/8/2025 26    [id number]  VITALS/GAS EXCHANGE/DRIPS    ABGS.     .  VS/ PO/IO/ VENT/ DRIPS.  6/16/2025 afeb 66 116/60   6/16/2025 5l 93%    XXXX[id number]  PROBLEM ASSESSMENT RECOMMENDATIONS.  RESP.   . GAS EXCHANGE .   .... target PO 90-95%     . COPD   .... ADVAIR 500 6/8/2025   .... MONTELUKAST 10 6/8/2025   .... SPIRIVA 6/8/2025     . RESP FAILURE  .... 6/8/2025 Has ho hypercapnia   .... 6/8/2025 recommend monitor abg    . MILD HEMOPTYSIS 6/10/2025   .... ct ch 6/10/2025 cw 5/31  ........ new mf infection in rul   ........ unchanged small r greater than left pl effsn   ....... enlarged pulm artery suggesting pulm hytn   .... 6/10/2025  dw hemat cardio id and instead of restarting apixa will start iv UFH which can be easily reversed in case Hb starts dropping but will be the rx for venous thromboembolism as well as for a fib   .... v duplx 6/10 (-)  .... vq 6/10 vlp   . 6/13/2025 continues to have mild hemoptysis but is also on iv ufh drip   . 6/13/2025 dw pt re rbaa of bronch and se agrees scheduled for 6/16 10 in or   . 6/13/2025 dw cardio and id     . PLEURITIC CHEST PAIN RO VTE   .... v duplx 6/10 (-)  .... vq 6/10 vlp   .... 6/12/2025 iv ufh was stopped 6/11 as vq vlp but was restarted by hospitalist 6/12/2025 for af       INFECTION.  . DATA  .... w 6/8-6/10-6/13-6/14-6/15/2025   .... w 13.6 - 9.2 - 15.9 - 18- 14   .... esr 6/8/2025 esr 17   .... w 6/8-6/9/2025 w 32 - 24   .... ua 6/8/2025 w 4   .... cxr 6/8/2025 cxr dense infiltra r upper hilum r mediport  .... ct ch 6/10 cw 5/31  ........ r greater than l effs   ........ partial rll atelectasis   ........ new patchy and nodular areas of consolidation rul and associated ground glass opacities   ........ ground glass and nodular opac also scott   ........ numerous tib rml and rll     . MICROBIO  .... sp 6/8 n commensals   .... flu ab 6/8/2025 (-)   .... rsv 6/8/2025 (-)    .... mrsa 6/9/2025 (-)  .... uc 6/8 (-)  .... bc 6/8 (-)     . PNEUMONIA RUL 6/8/2025   .... AZITHRO 6/8-6/11 /2025   .... ZOSYN 6/8-6/15/2025     CARDIAC.  . PAIN CHEST   .... 6/10/2025 co pain chest r   .... 6/10/2025 check ct to see if she has pleurisy   .... 6/10/2025  dw hemat cardio id and instead of restarting apixa will start iv UFH which can be easily reversed in case Hb starts dropping but will be the rx for venous thromboembolism as well as for a fib     . STRESS STEROIDS   .... HYDROCORTISONE   ........ 6/8/2025 h 50.4  ........ 6/10/2025 h 50.2   ........ 6/12/2025 hydrocort 50   ....... 6/14/2025 h 25    . SHOCK   .... MIDODRINE 6/8/2025 m 10.3   .... DROXIDOPA 6/9/2025 d 100.3   .... NOREPI 6/8-6/9/2025 n 8/250   .... target map 65 (+)     . CAD    .... Trop 6/8-6/9/2025 Tr 32- 24     . LACTICEMIA   .... la 6/8/2025 la 1.4     . CHF  .... pbnp 6/8/2025 pbnp 6599   .... tte 4/2025 mild ph  n vf  .... tte 6/2/2025   ........ ef 71%   ........ n rvsf    ........ pasp 54   ....... la mod dilatd   .... lasix 40 once 6/14/2025    . A fib (chronic) POA 6/8/2025  .... AMIODARONE 100 6/8/2025  .... 6/10/2025  dw hemat cardio id and instead of restarting apixa will start iv UFH which can be easily reversed in case Hb starts dropping but will be the rx for venous thromboembolism as well as for a fib   .... 6/12-6/14/2025 iv ufh     HEMAT.  . DATA  .... Plt 6/8/2025 plt 153   .... inr 6/8-6/9/2025 inr 2.27- 1.63      . ANEMIA   .... Hb 6/8-6/9-6/10-6/11-6/13-6/14-6/15/2025   .... Hb 7.5- 7.8 - 7.6- 7.1 - 8.4 - 7.3- 9.1     . TRANSFUSION  .... 6/8  1 u prbc    .... 6/14 2 u prbc     GI.   . DIET .   .... dash 6/8/2025     . LFT MONITORING   .... LFTS    6/8/2025  ........ AP     39  ........ AST   11  ........ ALT    35    RENAL.  . DATA  .... Na 6/8/2025 Na 137  .... K 6/8/2025 K 4   .... CO2 6/8/2025 co2 29   .... ag 6/8/2025 ag 9  .... alb 6/8/2025 alb 3.4     . KIMBERLY ON CKD   .... Cr 6/8-6/9-6/10-6/11-6/13-6/14/2025   .... Cr 1.9 - 1.4 - 1.3 - 1.3 - 1.1 - 1  .... Cr 5/27-5/28-5/29-5/30-6/1/2025   .... Cr 1.2 - 1.3 - 1.3 - 1.6 - 1.6    ENDO.  . DM  .  .... 6/8/2025 SL SCALE     NEURO.  . PAIN  .... GABAPENTIN 6/8/2025 g 400.2     XXXXXXXXXXXXXXXXXXXXXX   SUMMARY BASELINE .     84 yr old female pt of Dr Gallegos not on home ox or home cpap used to use trelegy lives with spouse quit 40 y 1/2 ppd with PMHx afib on eliquis, COPD (not on home O2), PE/Rt lower extremity DVT 2017, Rt LE IVC filter 2017 CKD3, aplastic anemia, polymyalgia rheumatica on prednisone 5 mg po qd, requiring PRBC transfusions around 8 weeks (via Rt subclavian mediport), AVN bilat hips, HTN, HLD, HFpEF (75% 6.2.25), diastolic dysfx grade1, recent hospitalization 5/25/25-6/5/25 for ADHF/COPD exacerbation, Pt with eventual improvement and transfer to [facility] facility   CC.   . 6/8/2025 R CHEST PAIN   MAIN ISSUES.  . COPD   . MILD HEMOPTYSOIS 6/10/2025   . PNEUMONIA RML 6/8/2025  .... ZOSYN 6/8/2025   . PLEURITIS CHEST PAIN 6/10/2025  .... 6/10 vte excluded vlp vq   . SHOCK 6/8/2025   .... resolvd tr oo icu 6/9   . NOREPI 6/8-6/9/2025   . STRESS STEROIDS   .... HYDROCORTISONE 6/8/2025  . A fib (chronic) POA 6/8/2025  .... 6/10-6/10/2025 IV UFH   .... 6/12 IV UFH   . ANEMIA Hb 6/8-6/13/2025 Hb 7.5- 8.4  . TRANSFUSION  .... 6/8  1 u prbc    . KIMBERLY ON CKD Cr 6/8-6/13/2025 Cr 1.9- 1.1  PMH.   . AFon Eliquis,   . COPD (not on home o2),   . CKD,   . aplastic anemia,   . TRANSFUSION 6/2/2025 1 u prbc   . PMR (chronic prednisone with AVN hip),   . HLD,   . HTN,   . DM    PROCEDURES/DEVICES .  . 6/16/2025 FOB br wash rul ooze rul l lo lobe post basal     PAST PROCEDURES   . r mediport poa 6/8/2025     DISCUSSIONS.  .... Discussed with primary care and relevant consultants on an ongoing basis       TIME SPENT.  . Over 36 minutes aggregate care time spent on encounter; activities included   direct patient care, counseling and/or coordinating care reviewing notes, lab data/ imaging , discussion with multidisciplinary team/ patient  /family and explaining in detail risks, benefits, alternatives  of the recommendations     ROBERT EHNRIQUEZ 84 6/8/2025 1942

## 2025-06-16 NOTE — PROGRESS NOTE ADULT - ASSESSMENT
84 female PMH of A-fib on Eliquis, COPD, CKD, aplastic anemia, polymyalgia rheumatica, on chronic steroids, avascular necrosis of hips, HLD, HTN, DM, recent admission to [facility] 5/26 through 6/5/2025 for CHF/fluid overload/COPD exacerbation, presents to the ED form [facility] for evaluation of fever and generalized feeling of being unwell, found to be septic and hypotensive.     Admitted to ICU for septic shock likely 2/2 to PNA and KIMBERLY.  - Now stable for downgrade to tele.  -CT chest 6/10 with multifocal R sided PNA  -followed by pulmonary , ID on anitbiotics   -supplemental 02 wean as able   -For bronch  6/16, optimized from cv standpoint to proceed with low risk procedure     - Had atypical CP, 6/11  -troponin negative   -resume home statin   -Ekg Sr with PVC LAD , possible alteral infarct     - CxR shows a focal consolidation in the right lung.  Abx per Primary  -no sign fluid overload on exam   -echo 6/12/25 as above ef 67% mild LVH , mod mitral valve calicification, mild mr, normal right atrial pressure , mod to severe pulmonary htn     - Bp stable    - s/p IV resuscitation and IV pressor  - Continue Midodrine and Northera    - Hx of paroxysmal atrial fibrillation and DVT/PE  -on elilquis at home   -  Heparin gtt held for hemoptysis for now followed by hem for MDS  - Continue home Amiodarone 100 mg daily    _AKI on CKD followed by renal     Monitor and replete electrolytes. Keep K>4.0 and Mg>2.0.     84 female PMH of A-fib on Eliquis, COPD, CKD, aplastic anemia, polymyalgia rheumatica, on chronic steroids, avascular necrosis of hips, HLD, HTN, DM, recent admission to [facility] 5/26 through 6/5/2025 for CHF/fluid overload/COPD exacerbation, presents to the ED form [facility] for evaluation of fever and generalized feeling of being unwell, found to be septic and hypotensive.     Admitted to ICU for septic shock likely 2/2 to PNA and KIMBERLY.  - Now stable for downgrade to tele.  -CT chest 6/10 with multifocal R sided PNA  -followed by pulmonary , ID on antibiotics   -supplemental 02 wean as able   -For bronch  6/16, optimized from cv standpoint to proceed with low risk procedure     - Had atypical CP, 6/11  -troponin negative   -resume home statin   -Ekg Sr with PVC LAD , possible alteral infarct     - CxR shows a focal consolidation in the right lung.  Abx per Primary  -no sign fluid overload on exam   -echo 6/12/25 as above ef 67% mild LVH , mod mitral valve calicification, mild mr, normal right atrial pressure , mod to severe pulmonary htn     - Bp stable    - s/p IV resuscitation and IV pressor  - Continue Midodrine and Northera    - Hx of paroxysmal atrial fibrillation and DVT/PE  -on elilquis at home   -  Heparin gtt held for hemoptysis for now followed by hem for MDS  - Continue home Amiodarone 100 mg daily    _AKI on CKD followed by renal     Monitor and replete electrolytes. Keep K>4.0 and Mg>2.0.

## 2025-06-16 NOTE — PROGRESS NOTE ADULT - SUBJECTIVE AND OBJECTIVE BOX
CHIEF COMPLAINT/ REASON FOR VISIT  .. Patient was seen to address the  issue listed under PROBLEM LIST which is located toward bottom of this note     MARTINEZ PATEL TELE 306 W1    Allergies    No Known Allergies    Intolerances        PAST MEDICAL & SURGICAL HISTORY:  COPD (chronic obstructive pulmonary disease)      DM (diabetes mellitus)  diet-controlled      PAF (paroxysmal atrial fibrillation)  s/p ablation      Hiatal hernia      H/O aplastic anemia      History of IBS      H/O osteoporosis      HLD (hyperlipidemia)      PMR (polymyalgia rheumatica)      History of basal cell cancer      Chronic kidney disease (CKD)      Hypotension      Presence of IVC filter      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      History of immunodeficiency      Lumbar compression fracture      H/O hiatal hernia      H/O pulmonary fibrosis      H/O sinus bradycardia      History of fracture of right hip  "unoperable"      S/P hysterectomy      S/P foot surgery      S/P knee surgery      After cataract  bilateral eye cataract surgically removed      Closed right hip fracture  rigth hip ORIF july 19 2016      S/P IVC filter  nov 2016      S/P carpal tunnel release  Right 2008 & 6/27/17      H/O kyphoplasty      Port-A-Cath in place  right chest          FAMILY HISTORY:  Family history of bladder cancer (Mother)    Family history of myocardial infarction (Father)    FH: atrial fibrillation (Father)        Home Medications:  amiodarone 200 mg oral tablet: 0.5 tab(s) orally once a day (08 Jun 2025 16:43)  Bentyl 10 mg oral capsule: 1 cap(s) orally 2 times a day, As Needed (08 Jun 2025 16:43)  Eliquis 2.5 mg oral tablet: 1 tab(s) orally 2 times a day (08 Jun 2025 16:43)  esomeprazole 20 mg oral delayed release capsule: 1 cap(s) orally once a day (08 Jun 2025 16:43)  fluticasone-salmeterol 500 mcg-50 mcg/inh inhalation powder: 1 puff(s) inhaled 2 times a day (08 Jun 2025 16:43)  folic acid 1 mg oral tablet: 1 tab(s) orally once a day (in the morning) (08 Jun 2025 16:43)  gabapentin 400 mg oral capsule: 1 cap(s) orally 2 times a day (08 Jun 2025 16:43)  ipratropium-albuterol 0.5 mg-2.5 mg/3 mL inhalation solution: 3 milliliter(s) inhaled every 6 hours As needed Shortness of Breath and/or Wheezing (08 Jun 2025 16:43)  IVIG monthly:  (08 Jun 2025 16:43)  leflunomide 10 mg oral tablet: 1 tab(s) orally once a day (08 Jun 2025 16:43)  midodrine 10 mg oral tablet: 1 tab(s) orally every 8 hours (08 Jun 2025 16:43)  montelukast 10 mg oral tablet: 1 tab(s) orally once a day (08 Jun 2025 16:43)  pantoprazole 40 mg oral delayed release tablet: 1 tab(s) orally once a day (before a meal) (08 Jun 2025 16:43)  predniSONE 5 mg oral tablet: 1 tab(s) orally once a day (08 Jun 2025 16:43)  Procrit 10,000 units/mL preservative-free injectable solution: injectable once a week WEDNESDAY (08 Jun 2025 16:43)  Reclast Infusion , IV x 1 yearly:  (08 Jun 2025 16:43)  simvastatin 10 mg oral tablet: 1 tab(s) orally once a day (at bedtime) (08 Jun 2025 16:43)  tiotropium 2.5 mcg/inh inhalation aerosol: 2 puff(s) inhaled once a day (08 Jun 2025 16:43)  tiZANidine: 2 tab(s) orally once a day (at bedtime) as needed for  muscle spasm (08 Jun 2025 16:43)  torsemide 20 mg oral tablet: 1 tab(s) orally once a day (in the morning) (08 Jun 2025 16:49)      MEDICATIONS  (STANDING):  albuterol/ipratropium for Nebulization 3 milliLiter(s) Nebulizer every 6 hours  aMIOdarone    Tablet 100 milliGRAM(s) Oral daily  chlorhexidine 2% Cloths 1 Application(s) Topical <User Schedule>  dextrose 5%. 1000 milliLiter(s) (100 mL/Hr) IV Continuous <Continuous>  dextrose 5%. 1000 milliLiter(s) (50 mL/Hr) IV Continuous <Continuous>  dextrose 50% Injectable 25 Gram(s) IV Push once  dextrose 50% Injectable 12.5 Gram(s) IV Push once  dextrose 50% Injectable 25 Gram(s) IV Push once  droxidopa 100 milliGRAM(s) Oral every 8 hours  fluticasone propionate/ salmeterol 500-50 MICROgram(s) Diskus 1 Dose(s) Inhalation two times a day  gabapentin 400 milliGRAM(s) Oral every 12 hours  glucagon  Injectable 1 milliGRAM(s) IntraMuscular once  hydrocortisone sodium succinate Injectable 25 milliGRAM(s) IV Push daily  insulin lispro (ADMELOG) corrective regimen sliding scale   SubCutaneous three times a day before meals  insulin lispro (ADMELOG) corrective regimen sliding scale   SubCutaneous at bedtime  midodrine 10 milliGRAM(s) Oral every 8 hours  montelukast 10 milliGRAM(s) Oral daily  pantoprazole  Injectable 40 milliGRAM(s) IV Push daily  piperacillin/tazobactam IVPB.. 3.375 Gram(s) IV Intermittent every 8 hours  sodium chloride 0.45%. 1000 milliLiter(s) (50 mL/Hr) IV Continuous <Continuous>  tiotropium 2.5 MICROgram(s) Inhaler 2 Puff(s) Inhalation daily    MEDICATIONS  (PRN):  acetaminophen     Tablet .. 650 milliGRAM(s) Oral every 6 hours PRN Mild Pain (1 - 3)  dextrose Oral Gel 15 Gram(s) Oral once PRN Blood Glucose LESS THAN 70 milliGRAM(s)/deciliter      Diet, NPO after Midnight:      NPO Start Date: 15-Nick-2025,   NPO Start Time: 23:59  Except Medications (06-15-25 @ 16:34) [Active]  Diet, Regular:   DASH/TLC Sodium & Cholesterol Restricted (06-08-25 @ 15:56) [Active]          Vital Signs Last 24 Hrs  T(C): 37.1 (16 Jun 2025 05:10), Max: 37.1 (15 Nick 2025 20:40)  T(F): 98.8 (16 Jun 2025 05:10), Max: 98.8 (15 Nick 2025 20:40)  HR: 86 (16 Jun 2025 05:10) (70 - 86)  BP: 149/77 (16 Jun 2025 05:10) (110/57 - 149/77)  BP(mean): --  RR: 18 (16 Jun 2025 05:10) (18 - 18)  SpO2: 91% (16 Jun 2025 05:10) (91% - 94%)    Parameters below as of 16 Jun 2025 05:10  Patient On (Oxygen Delivery Method): nasal cannula  O2 Flow (L/min): 2        06-14-25 @ 07:01  -  06-15-25 @ 07:00  --------------------------------------------------------  IN: 0 mL / OUT: 601 mL / NET: -601 mL    06-15-25 @ 07:01  -  06-16-25 @ 06:54  --------------------------------------------------------  IN: 0 mL / OUT: 700 mL / NET: -700 mL              LABS:                        9.1    14.51 )-----------( 232      ( 15 Nick 2025 06:50 )             26.7     06-15    141  |  106  |  31[H]  ----------------------------<  108[H]  3.8   |  30  |  0.96    Ca    8.7      15 Nick 2025 06:50  Phos  2.6     06-15    TPro  5.5[L]  /  Alb  2.8[L]  /  TBili  0.9  /  DBili  x   /  AST  6[L]  /  ALT  27  /  AlkPhos  40  06-15    PT/INR - ( 15 Nick 2025 06:50 )   PT: 15.0 sec;   INR: 1.27 ratio           Urinalysis Basic - ( 15 Nick 2025 06:50 )    Color: x / Appearance: x / SG: x / pH: x  Gluc: 108 mg/dL / Ketone: x  / Bili: x / Urobili: x   Blood: x / Protein: x / Nitrite: x   Leuk Esterase: x / RBC: x / WBC x   Sq Epi: x / Non Sq Epi: x / Bacteria: x            WBC:  WBC Count: 14.51 K/uL (06-15 @ 06:50)  WBC Count: 18.77 K/uL (06-14 @ 08:13)  WBC Count: 15.96 K/uL (06-13 @ 11:40)  WBC Count: 12.65 K/uL (06-12 @ 17:14)  WBC Count: 8.82 K/uL (06-12 @ 09:20)      MICROBIOLOGY:  RECENT CULTURES:              PT/INR - ( 15 Nick 2025 06:50 )   PT: 15.0 sec;   INR: 1.27 ratio             Sodium:  Sodium: 141 mmol/L (06-15 @ 06:50)  Sodium: 140 mmol/L (06-14 @ 08:13)  Sodium: 137 mmol/L (06-13 @ 16:10)  Sodium: 136 mmol/L (06-13 @ 07:30)  Sodium: 139 mmol/L (06-12 @ 09:20)      0.96 mg/dL 06-15 @ 06:50  1.00 mg/dL 06-14 @ 08:13  1.10 mg/dL 06-13 @ 16:10  1.20 mg/dL 06-13 @ 07:30  1.30 mg/dL 06-12 @ 09:20      Hemoglobin:  Hemoglobin: 9.1 g/dL (06-15 @ 06:50)  Hemoglobin: 7.3 g/dL (06-14 @ 08:13)  Hemoglobin: 8.4 g/dL (06-13 @ 11:40)  Hemoglobin: 8.8 g/dL (06-12 @ 17:14)  Hemoglobin: 8.8 g/dL (06-12 @ 09:20)      Platelets: Platelet Count - Automated: 232 K/uL (06-15 @ 06:50)  Platelet Count - Automated: 241 K/uL (06-14 @ 08:13)  Platelet Count - Automated: 273 K/uL (06-13 @ 11:40)  Platelet Count - Automated: 287 K/uL (06-12 @ 17:14)  Platelet Count - Automated: 271 K/uL (06-12 @ 09:20)      LIVER FUNCTIONS - ( 15 Nick 2025 06:50 )  Alb: 2.8 g/dL / Pro: 5.5 g/dL / ALK PHOS: 40 U/L / ALT: 27 U/L / AST: 6 U/L / GGT: x             Urinalysis Basic - ( 15 Nick 2025 06:50 )    Color: x / Appearance: x / SG: x / pH: x  Gluc: 108 mg/dL / Ketone: x  / Bili: x / Urobili: x   Blood: x / Protein: x / Nitrite: x   Leuk Esterase: x / RBC: x / WBC x   Sq Epi: x / Non Sq Epi: x / Bacteria: x        RADIOLOGY & ADDITIONAL STUDIES:      MICROBIOLOGY:  RECENT CULTURES:

## 2025-06-16 NOTE — DIETITIAN INITIAL EVALUATION ADULT - PERTINENT MEDS FT
MEDICATIONS  (STANDING):  albuterol/ipratropium for Nebulization 3 milliLiter(s) Nebulizer every 6 hours  aMIOdarone    Tablet 100 milliGRAM(s) Oral daily  chlorhexidine 2% Cloths 1 Application(s) Topical <User Schedule>  dextrose 5%. 1000 milliLiter(s) (100 mL/Hr) IV Continuous <Continuous>  dextrose 5%. 1000 milliLiter(s) (50 mL/Hr) IV Continuous <Continuous>  dextrose 50% Injectable 25 Gram(s) IV Push once  dextrose 50% Injectable 12.5 Gram(s) IV Push once  dextrose 50% Injectable 25 Gram(s) IV Push once  droxidopa 100 milliGRAM(s) Oral every 8 hours  fluticasone propionate/ salmeterol 500-50 MICROgram(s) Diskus 1 Dose(s) Inhalation two times a day  gabapentin 400 milliGRAM(s) Oral every 12 hours  glucagon  Injectable 1 milliGRAM(s) IntraMuscular once  hydrocortisone sodium succinate Injectable 25 milliGRAM(s) IV Push daily  insulin lispro (ADMELOG) corrective regimen sliding scale   SubCutaneous three times a day before meals  insulin lispro (ADMELOG) corrective regimen sliding scale   SubCutaneous at bedtime  magnesium sulfate  IVPB 2 Gram(s) IV Intermittent once  magnesium sulfate  IVPB 2 Gram(s) IV Intermittent once  midodrine 10 milliGRAM(s) Oral every 8 hours  montelukast 10 milliGRAM(s) Oral daily  pantoprazole  Injectable 40 milliGRAM(s) IV Push daily  piperacillin/tazobactam IVPB.. 3.375 Gram(s) IV Intermittent every 8 hours  sodium chloride 0.45%. 1000 milliLiter(s) (50 mL/Hr) IV Continuous <Continuous>  tiotropium 2.5 MICROgram(s) Inhaler 2 Puff(s) Inhalation daily    MEDICATIONS  (PRN):  acetaminophen     Tablet .. 650 milliGRAM(s) Oral every 6 hours PRN Mild Pain (1 - 3)  dextrose Oral Gel 15 Gram(s) Oral once PRN Blood Glucose LESS THAN 70 milliGRAM(s)/deciliter

## 2025-06-16 NOTE — DIETITIAN INITIAL EVALUATION ADULT - NSFNSPHYEXAMSKINFT_GEN_A_CORE
Pressure Injury 1: sacrum, Stage I  Pressure Injury 2: Left:, Hip, Stage I  Pressure Injury 3: Left:, heel, Stage I

## 2025-06-16 NOTE — PROGRESS NOTE ADULT - PROBLEM SELECTOR PLAN 10
on home prednisone 5mg daily  and leflunomide (10mg) if restarted Patient will need to bring in home medication to bring to pharmacy to dispense.  -now on IV solumedrol 50mg q6; plan to down titrate in next few days on home prednisone 5mg daily  and leflunomide (10mg) if restarted Patient will need to bring in home medication to bring to pharmacy to dispense.  -now on IV solumedrol 25mg qd (titrating)

## 2025-06-16 NOTE — DIETITIAN INITIAL EVALUATION ADULT - ORAL INTAKE PTA/DIET HISTORY
patient seen in bed. NPO for procedure today. no food allergies. no difficulties chewing swallowing. A1c 5.6% patient reports is not a Diabetic noted off home meds. on Prednisone from home , follow [facility]IE diet at home . patient was eating well prior to NPO . patient accepted diet education last admit . current review verbal of low Na diet

## 2025-06-16 NOTE — PROGRESS NOTE ADULT - PROBLEM SELECTOR PLAN 1
Patient found to have hemoptysis  - Will discontinue heparin gtt  - Plan for bronch on monday as per pulm  - Heme onc following  - ID following  - Pulm following  -Pt is otherwise medically optimized for planned bronchoscopy today. She has no decompensated HF, malignant arrhythmia, valvular heart dz or current ischemia; and remains a low risk for an overall low risk procedure. Patient found to have hemoptysis  - Will discontinue heparin gtt  - Plan for bronch today as per pulm  - Heme onc following  - ID following  - Pulm following  -Pt is otherwise medically optimized for planned bronchoscopy today. She has no decompensated HF, malignant arrhythmia, valvular heart dz or current ischemia; and remains a low risk for an overall low risk procedure.

## 2025-06-16 NOTE — PROGRESS NOTE ADULT - ATTENDING COMMENTS
84F h/o AFib on Eliquis, COPD, CKD, aplastic anemia, polymyalgia rheumatica on chronic steroids, avascular necrosis of hips, HTN, HLD, with recent admission to [facility] 5/26 - 6/5 for acute HFpEF exacerbation and COPD exacerbation, discharged to rehab on 6/5 and returned to ED with right sided chest pain, general malaise and feeling unwell. Admitted to ICU for septic shock and acute hypoxic respiratory failure likely 2/2 to PNA and KIMBERLY. Now downgraded to tele floors. Continue IV Zosyn - discussed with ID Dr Velazco continue for now pending bronchoscopy results. Legionella negative, off Azithromycin. Follow up BAL bacterial, fungal and AFB cultures, quantiferon, serum galactomannan. Taper off supplemental O2 with appropriate SpO2. Leukocytosis likely related to steroids. Decreased to daily dosing - taper steroids to discontinuation.  Anemia - stop heparin gtt. H/H improved s/p additional 2 units pRBC.  Continue midodrine and notherna.

## 2025-06-16 NOTE — DIETITIAN INITIAL EVALUATION ADULT - NS FNS DIET ORDER
Diet, NPO after Midnight:      NPO Start Date: 15-Nick-2025,   NPO Start Time: 23:59  Except Medications (06-15-25 @ 16:34)

## 2025-06-16 NOTE — SOCIAL WORK PROGRESS NOTE - NSSWPROGRESSNOTE_GEN_ALL_CORE
Pt was discussed during rounds this AM; remains acute at this time. Pt is NPO for bronchoscopy today. DC to [facility] when stable for dc. SW will continue to follow.

## 2025-06-17 LAB
ALBUMIN SERPL ELPH-MCNC: 2.8 G/DL — LOW (ref 3.3–5)
ALP SERPL-CCNC: 47 U/L — SIGNIFICANT CHANGE UP (ref 40–120)
ALT FLD-CCNC: 21 U/L — SIGNIFICANT CHANGE UP (ref 12–78)
ANION GAP SERPL CALC-SCNC: 6 MMOL/L — SIGNIFICANT CHANGE UP (ref 5–17)
AST SERPL-CCNC: 5 U/L — LOW (ref 15–37)
BILIRUB SERPL-MCNC: 0.7 MG/DL — SIGNIFICANT CHANGE UP (ref 0.2–1.2)
BUN SERPL-MCNC: 35 MG/DL — HIGH (ref 7–23)
CALCIUM SERPL-MCNC: 8.5 MG/DL — SIGNIFICANT CHANGE UP (ref 8.5–10.1)
CHLORIDE SERPL-SCNC: 106 MMOL/L — SIGNIFICANT CHANGE UP (ref 96–108)
CO2 SERPL-SCNC: 30 MMOL/L — SIGNIFICANT CHANGE UP (ref 22–31)
CREAT SERPL-MCNC: 1.2 MG/DL — SIGNIFICANT CHANGE UP (ref 0.5–1.3)
EGFR: 45 ML/MIN/1.73M2 — LOW
EGFR: 45 ML/MIN/1.73M2 — LOW
GALACTOMANNAN AG SERPL-ACNC: 0.03 INDEX — SIGNIFICANT CHANGE UP (ref 0–0.49)
GLUCOSE SERPL-MCNC: 142 MG/DL — HIGH (ref 70–99)
GRAM STN FLD: SIGNIFICANT CHANGE UP
MAGNESIUM SERPL-MCNC: 1.7 MG/DL — SIGNIFICANT CHANGE UP (ref 1.6–2.6)
NIGHT BLUE STAIN TISS: SIGNIFICANT CHANGE UP
PHOSPHATE SERPL-MCNC: 2.7 MG/DL — SIGNIFICANT CHANGE UP (ref 2.5–4.5)
POTASSIUM SERPL-MCNC: 4.3 MMOL/L — SIGNIFICANT CHANGE UP (ref 3.5–5.3)
POTASSIUM SERPL-SCNC: 4.3 MMOL/L — SIGNIFICANT CHANGE UP (ref 3.5–5.3)
PROT SERPL-MCNC: 6.4 G/DL — SIGNIFICANT CHANGE UP (ref 6–8.3)
SODIUM SERPL-SCNC: 142 MMOL/L — SIGNIFICANT CHANGE UP (ref 135–145)
SPECIMEN SOURCE: SIGNIFICANT CHANGE UP
SPECIMEN SOURCE: SIGNIFICANT CHANGE UP

## 2025-06-17 PROCEDURE — 99233 SBSQ HOSP IP/OBS HIGH 50: CPT | Mod: GC

## 2025-06-17 PROCEDURE — 99233 SBSQ HOSP IP/OBS HIGH 50: CPT

## 2025-06-17 PROCEDURE — G0545: CPT

## 2025-06-17 PROCEDURE — 99232 SBSQ HOSP IP/OBS MODERATE 35: CPT

## 2025-06-17 RX ORDER — AMIODARONE HYDROCHLORIDE 50 MG/ML
100 INJECTION, SOLUTION INTRAVENOUS DAILY
Refills: 0 | Status: DISCONTINUED | OUTPATIENT
Start: 2025-06-17 | End: 2025-07-04

## 2025-06-17 RX ORDER — ACETAMINOPHEN 500 MG/5ML
650 LIQUID (ML) ORAL EVERY 6 HOURS
Refills: 0 | Status: DISCONTINUED | OUTPATIENT
Start: 2025-06-17 | End: 2025-07-04

## 2025-06-17 RX ORDER — FUROSEMIDE 10 MG/ML
40 INJECTION INTRAMUSCULAR; INTRAVENOUS ONCE
Refills: 0 | Status: COMPLETED | OUTPATIENT
Start: 2025-06-17 | End: 2025-06-17

## 2025-06-17 RX ORDER — TORSEMIDE 20 MG/1
20 TABLET ORAL DAILY
Refills: 0 | Status: DISCONTINUED | OUTPATIENT
Start: 2025-06-18 | End: 2025-06-21

## 2025-06-17 RX ADMIN — GABAPENTIN 400 MILLIGRAM(S): 400 CAPSULE ORAL at 05:59

## 2025-06-17 RX ADMIN — Medication 25 GRAM(S): at 22:11

## 2025-06-17 RX ADMIN — Medication 25 GRAM(S): at 14:00

## 2025-06-17 RX ADMIN — Medication 25 GRAM(S): at 06:00

## 2025-06-17 RX ADMIN — Medication 1 DOSE(S): at 06:35

## 2025-06-17 RX ADMIN — Medication 1 DOSE(S): at 22:10

## 2025-06-17 RX ADMIN — IPRATROPIUM BROMIDE AND ALBUTEROL SULFATE 3 MILLILITER(S): .5; 2.5 SOLUTION RESPIRATORY (INHALATION) at 07:50

## 2025-06-17 RX ADMIN — Medication 40 MILLIGRAM(S): at 14:00

## 2025-06-17 RX ADMIN — IPRATROPIUM BROMIDE AND ALBUTEROL SULFATE 3 MILLILITER(S): .5; 2.5 SOLUTION RESPIRATORY (INHALATION) at 14:04

## 2025-06-17 RX ADMIN — Medication 25 MILLIGRAM(S): at 05:59

## 2025-06-17 RX ADMIN — IPRATROPIUM BROMIDE AND ALBUTEROL SULFATE 3 MILLILITER(S): .5; 2.5 SOLUTION RESPIRATORY (INHALATION) at 20:02

## 2025-06-17 RX ADMIN — GABAPENTIN 400 MILLIGRAM(S): 400 CAPSULE ORAL at 17:21

## 2025-06-17 RX ADMIN — IPRATROPIUM BROMIDE AND ALBUTEROL SULFATE 3 MILLILITER(S): .5; 2.5 SOLUTION RESPIRATORY (INHALATION) at 01:06

## 2025-06-17 RX ADMIN — FUROSEMIDE 40 MILLIGRAM(S): 10 INJECTION INTRAMUSCULAR; INTRAVENOUS at 14:00

## 2025-06-17 NOTE — PROGRESS NOTE ADULT - ATTENDING COMMENTS
84F h/o AFib on Eliquis, COPD, CKD, aplastic anemia, polymyalgia rheumatica on chronic steroids, avascular necrosis of hips, HTN, HLD, with recent admission to [facility] 5/26 - 6/5 for acute HFpEF exacerbation and COPD exacerbation, discharged to rehab on 6/5 and returned to ED with right sided chest pain, general malaise and feeling unwell. Admitted to ICU for septic shock and acute hypoxic respiratory failure likely 2/2 to PNA and KIMBERLY. Now downgraded to tele floors. Continue IV Zosyn - discussed with ID Dr Velazco continue for now pending bronchoscopy results. Legionella negative, off Azithromycin. Follow up BAL bacterial, fungal and AFB cultures, quantiferon, serum galactomannan. Taper off supplemental O2 with appropriate SpO2. Leukocytosis likely related to steroids. Decreased to daily dosing - taper steroids to discontinuation.  Anemia - stop heparin gtt. H/H improved s/p additional 2 units pRBC.  Continue midodrine and notherna.   Mildly volume overloaded this AM - discussed with cardio Dr Hernandez IV Lasix 40mg x 1 and restart home Torsemide.

## 2025-06-17 NOTE — PROGRESS NOTE ADULT - ASSESSMENT
83 yo woman well known to our office w multifactorial anemia (MDS, renal insuff, iron def, on Procrit, prn IV iron, and PRBC transfusin dependent)  Recent adm for COPD exacerbation dc to Rehab then readm this time for pneumonia/sepsis    -continue acute care  -on prn transfusion  -Hgb had been 9s post latest transfusin but today decr to 8.1, no sign of gross blood loss, contiue monitor serially, may need transfusion again

## 2025-06-17 NOTE — PROGRESS NOTE ADULT - PROBLEM SELECTOR PLAN 3
- The patient is noted to have blood-tinged sputum  - requiring PRBC transfusions ~8 weeks (via Rt subclavian mediport)  - Hgb 7.6 (baseline appears to be ~8)   - transfuse <7-7.5  - Will hold Eliquis until clinical improvement is noted  - 6/12 heparin gtt started  - 6/13 : heparin gtt now discontinued 2/2 hemoptysis  - S/p 2 unit PRBC- will give lasix 40mg IVP x1 in between units -- good response

## 2025-06-17 NOTE — SWALLOW BEDSIDE ASSESSMENT ADULT - ASR SWALLOW RECOMMEND DIAG
Plan to follow up with patient at bedside and assess need for objective assessment and diet tolerance. Patient with no observed s/sx of aspiration at this time.

## 2025-06-17 NOTE — PROGRESS NOTE ADULT - SUBJECTIVE AND OBJECTIVE BOX
CHIEF COMPLAINT/ REASON FOR VISIT  .. Patient was seen to address the  issue listed under PROBLEM LIST which is located toward bottom of this note     MARTINEZ PATEL TELE 306 W1    Allergies    No Known Allergies    Intolerances        PAST MEDICAL & SURGICAL HISTORY:  COPD (chronic obstructive pulmonary disease)      DM (diabetes mellitus)  diet-controlled      PAF (paroxysmal atrial fibrillation)  s/p ablation      Hiatal hernia      H/O aplastic anemia      History of IBS      H/O osteoporosis      HLD (hyperlipidemia)      PMR (polymyalgia rheumatica)      History of basal cell cancer      Chronic kidney disease (CKD)      Hypotension      Presence of IVC filter      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      History of immunodeficiency      Lumbar compression fracture      H/O hiatal hernia      H/O pulmonary fibrosis      H/O sinus bradycardia      History of fracture of right hip  "unoperable"      S/P hysterectomy      S/P foot surgery      S/P knee surgery      After cataract  bilateral eye cataract surgically removed      Closed right hip fracture  rigth hip ORIF july 19 2016      S/P IVC filter  nov 2016      S/P carpal tunnel release  Right 2008 & 6/27/17      H/O kyphoplasty      Port-A-Cath in place  right chest          FAMILY HISTORY:  Family history of bladder cancer (Mother)    Family history of myocardial infarction (Father)    FH: atrial fibrillation (Father)        Home Medications:  amiodarone 200 mg oral tablet: 0.5 tab(s) orally once a day (08 Jun 2025 16:43)  Bentyl 10 mg oral capsule: 1 cap(s) orally 2 times a day, As Needed (08 Jun 2025 16:43)  Eliquis 2.5 mg oral tablet: 1 tab(s) orally 2 times a day (08 Jun 2025 16:43)  esomeprazole 20 mg oral delayed release capsule: 1 cap(s) orally once a day (08 Jun 2025 16:43)  fluticasone-salmeterol 500 mcg-50 mcg/inh inhalation powder: 1 puff(s) inhaled 2 times a day (08 Jun 2025 16:43)  folic acid 1 mg oral tablet: 1 tab(s) orally once a day (in the morning) (08 Jun 2025 16:43)  gabapentin 400 mg oral capsule: 1 cap(s) orally 2 times a day (08 Jun 2025 16:43)  ipratropium-albuterol 0.5 mg-2.5 mg/3 mL inhalation solution: 3 milliliter(s) inhaled every 6 hours As needed Shortness of Breath and/or Wheezing (08 Jun 2025 16:43)  IVIG monthly:  (08 Jun 2025 16:43)  leflunomide 10 mg oral tablet: 1 tab(s) orally once a day (08 Jun 2025 16:43)  midodrine 10 mg oral tablet: 1 tab(s) orally every 8 hours (08 Jun 2025 16:43)  montelukast 10 mg oral tablet: 1 tab(s) orally once a day (08 Jun 2025 16:43)  pantoprazole 40 mg oral delayed release tablet: 1 tab(s) orally once a day (before a meal) (08 Jun 2025 16:43)  predniSONE 5 mg oral tablet: 1 tab(s) orally once a day (08 Jun 2025 16:43)  Procrit 10,000 units/mL preservative-free injectable solution: injectable once a week WEDNESDAY (08 Jun 2025 16:43)  Reclast Infusion , IV x 1 yearly:  (08 Jun 2025 16:43)  simvastatin 10 mg oral tablet: 1 tab(s) orally once a day (at bedtime) (08 Jun 2025 16:43)  tiotropium 2.5 mcg/inh inhalation aerosol: 2 puff(s) inhaled once a day (08 Jun 2025 16:43)  tiZANidine: 2 tab(s) orally once a day (at bedtime) as needed for  muscle spasm (08 Jun 2025 16:43)  torsemide 20 mg oral tablet: 1 tab(s) orally once a day (in the morning) (08 Jun 2025 16:49)      MEDICATIONS  (STANDING):  albuterol/ipratropium for Nebulization 3 milliLiter(s) Nebulizer every 6 hours  dextrose 50% Injectable 25 Gram(s) IV Push once  dextrose 50% Injectable 12.5 Gram(s) IV Push once  dextrose 50% Injectable 25 Gram(s) IV Push once  fluticasone propionate/ salmeterol 500-50 MICROgram(s) Diskus 1 Dose(s) Inhalation two times a day  gabapentin 400 milliGRAM(s) Oral every 12 hours  hydrocortisone sodium succinate Injectable 25 milliGRAM(s) IV Push daily  insulin lispro (ADMELOG) corrective regimen sliding scale   SubCutaneous three times a day before meals  insulin lispro (ADMELOG) corrective regimen sliding scale   SubCutaneous at bedtime  pantoprazole  Injectable 40 milliGRAM(s) IV Push daily  piperacillin/tazobactam IVPB.. 3.375 Gram(s) IV Intermittent every 8 hours    MEDICATIONS  (PRN):  dextrose Oral Gel 15 Gram(s) Oral once PRN Blood Glucose LESS THAN 70 milliGRAM(s)/deciliter      Diet, Regular:   DASH/TLC Sodium & Cholesterol Restricted (06-16-25 @ 21:27) [Active]          Vital Signs Last 24 Hrs  T(C): 36.9 (17 Jun 2025 05:29), Max: 37.4 (16 Jun 2025 12:22)  T(F): 98.4 (17 Jun 2025 05:29), Max: 99.3 (16 Jun 2025 12:22)  HR: 82 (17 Jun 2025 05:29) (78 - 104)  BP: 125/65 (17 Jun 2025 05:29) (106/62 - 151/75)  BP(mean): --  RR: 18 (17 Jun 2025 05:29) (18 - 25)  SpO2: 94% (17 Jun 2025 05:29) (92% - 100%)    Parameters below as of 17 Jun 2025 05:29  Patient On (Oxygen Delivery Method): nasal cannula  O2 Flow (L/min): 3        06-16-25 @ 07:01  -  06-17-25 @ 07:00  --------------------------------------------------------  IN: 195 mL / OUT: 0 mL / NET: 195 mL              LABS:                        9.6    16.85 )-----------( 306      ( 16 Jun 2025 06:10 )             28.6     06-16    143  |  106  |  30[H]  ----------------------------<  106[H]  4.0   |  30  |  0.99    Ca    9.0      16 Jun 2025 06:10  Phos  2.5     06-16  Mg     1.5     06-16        Urinalysis Basic - ( 16 Jun 2025 06:10 )    Color: x / Appearance: x / SG: x / pH: x  Gluc: 106 mg/dL / Ketone: x  / Bili: x / Urobili: x   Blood: x / Protein: x / Nitrite: x   Leuk Esterase: x / RBC: x / WBC x   Sq Epi: x / Non Sq Epi: x / Bacteria: x            WBC:  WBC Count: 16.85 K/uL (06-16 @ 06:10)  WBC Count: 14.51 K/uL (06-15 @ 06:50)  WBC Count: 18.77 K/uL (06-14 @ 08:13)  WBC Count: 15.96 K/uL (06-13 @ 11:40)      MICROBIOLOGY:  RECENT CULTURES:  06-16 Bronch Wash Bronchial Wash XXXX   Rare Squamous epithelial cells seen per low power field  No polymorphonuclear leukocytes seen per low power field  No organisms seen per oil power field XXXX    06-16 Bronchial Bronchial Wash XXXX XXXX   Testing in progress    06-16 Bronch Wash Bronchial Wash XXXX   Rare polymorphonuclear leukocytes per oil power field  No Squamous epithelial cells per oil power field  No organisms seen per oil power field XXXX                    Sodium:  Sodium: 143 mmol/L (06-16 @ 06:10)  Sodium: 141 mmol/L (06-15 @ 06:50)  Sodium: 140 mmol/L (06-14 @ 08:13)  Sodium: 137 mmol/L (06-13 @ 16:10)      0.99 mg/dL 06-16 @ 06:10  0.96 mg/dL 06-15 @ 06:50  1.00 mg/dL 06-14 @ 08:13  1.10 mg/dL 06-13 @ 16:10      Hemoglobin:  Hemoglobin: 9.6 g/dL (06-16 @ 06:10)  Hemoglobin: 9.1 g/dL (06-15 @ 06:50)  Hemoglobin: 7.3 g/dL (06-14 @ 08:13)  Hemoglobin: 8.4 g/dL (06-13 @ 11:40)      Platelets: Platelet Count - Automated: 306 K/uL (06-16 @ 06:10)  Platelet Count - Automated: 232 K/uL (06-15 @ 06:50)  Platelet Count - Automated: 241 K/uL (06-14 @ 08:13)  Platelet Count - Automated: 273 K/uL (06-13 @ 11:40)          Urinalysis Basic - ( 16 Jun 2025 06:10 )    Color: x / Appearance: x / SG: x / pH: x  Gluc: 106 mg/dL / Ketone: x  / Bili: x / Urobili: x   Blood: x / Protein: x / Nitrite: x   Leuk Esterase: x / RBC: x / WBC x   Sq Epi: x / Non Sq Epi: x / Bacteria: x        RADIOLOGY & ADDITIONAL STUDIES:      MICROBIOLOGY:  RECENT CULTURES:  06-16 Bronch Wash Bronchial Wash XXXX   Rare Squamous epithelial cells seen per low power field  No polymorphonuclear leukocytes seen per low power field  No organisms seen per oil power field XXXX    06-16 Bronchial Bronchial Wash XXXX XXXX   Testing in progress    06-16 Bronch Wash Bronchial Wash XXXX   Rare polymorphonuclear leukocytes per oil power field  No Squamous epithelial cells per oil power field  No organisms seen per oil power field XXXX

## 2025-06-17 NOTE — PROGRESS NOTE ADULT - SUBJECTIVE AND OBJECTIVE BOX
[facility] -- Sai Valle, Girish Jackson Savella, , Rene Hernandez  Office # [phone]    Follow Up:  Hypotension, Afib    Subjective/Observations: Sitting on the chair.  NC 5L in use.  Breathing better, though, c/o hemoptysis of at least almost a teaspoon.  Denies dizziness or lightheadedness.  Denies CP or palpitations.      REVIEW OF SYSTEMS: All other review of systems is negative unless indicated above  PAST MEDICAL & SURGICAL HISTORY:  COPD (chronic obstructive pulmonary disease)      DM (diabetes mellitus)  diet-controlled      PAF (paroxysmal atrial fibrillation)  s/p ablation      Hiatal hernia      H/O aplastic anemia      History of IBS      H/O osteoporosis      HLD (hyperlipidemia)      PMR (polymyalgia rheumatica)      History of basal cell cancer      Chronic kidney disease (CKD)      Hypotension      Presence of IVC filter      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      History of immunodeficiency      Lumbar compression fracture      H/O hiatal hernia      H/O pulmonary fibrosis      H/O sinus bradycardia      History of fracture of right hip  "unoperable"      S/P hysterectomy      S/P foot surgery      S/P knee surgery      After cataract  bilateral eye cataract surgically removed      Closed right hip fracture  rigth hip ORIF july 19 2016      S/P IVC filter  nov 2016      S/P carpal tunnel release  Right 2008 & 6/27/17      H/O kyphoplasty      Port-A-Cath in place  right chest        MEDICATIONS  (STANDING):  albuterol/ipratropium for Nebulization 3 milliLiter(s) Nebulizer every 6 hours  dextrose 50% Injectable 25 Gram(s) IV Push once  dextrose 50% Injectable 12.5 Gram(s) IV Push once  dextrose 50% Injectable 25 Gram(s) IV Push once  fluticasone propionate/ salmeterol 500-50 MICROgram(s) Diskus 1 Dose(s) Inhalation two times a day  gabapentin 400 milliGRAM(s) Oral every 12 hours  hydrocortisone sodium succinate Injectable 25 milliGRAM(s) IV Push daily  insulin lispro (ADMELOG) corrective regimen sliding scale   SubCutaneous three times a day before meals  insulin lispro (ADMELOG) corrective regimen sliding scale   SubCutaneous at bedtime  pantoprazole  Injectable 40 milliGRAM(s) IV Push daily  piperacillin/tazobactam IVPB.. 3.375 Gram(s) IV Intermittent every 8 hours    MEDICATIONS  (PRN):  dextrose Oral Gel 15 Gram(s) Oral once PRN Blood Glucose LESS THAN 70 milliGRAM(s)/deciliter    Allergies    No Known Allergies    Intolerances      Vital Signs Last 24 Hrs  T(C): 36.9 (17 Jun 2025 05:29), Max: 37.4 (16 Jun 2025 12:22)  T(F): 98.4 (17 Jun 2025 05:29), Max: 99.3 (16 Jun 2025 12:22)  HR: 82 (17 Jun 2025 05:29) (78 - 104)  BP: 125/65 (17 Jun 2025 05:29) (106/62 - 151/75)  BP(mean): --  RR: 18 (17 Jun 2025 05:29) (18 - 25)  SpO2: 94% (17 Jun 2025 05:29) (92% - 100%)    Parameters below as of 17 Jun 2025 05:29  Patient On (Oxygen Delivery Method): nasal cannula  O2 Flow (L/min): 3    I&O's Summary    16 Jun 2025 07:01  -  17 Jun 2025 07:00  --------------------------------------------------------  IN: 195 mL / OUT: 0 mL / NET: 195 mL      Weight (kg): 64.4 (06-16 @ 10:58)  PHYSICAL EXAM:  TELE: Not on tele  Constitutional: NAD, awake and alert, well-developed  HEENT: Moist Mucous Membranes, Anicteric  Pulmonary: Non-labored, breath sounds are diminished bilaterally, No wheezing, rales or rhonchi  Cardiovascular: IRRR, S1 and S2, +murmurs, no rubs, gallops or clicks  Gastrointestinal: Bowel Sounds present, soft, nontender.   Lymph: Trace peripheral edema. No lymphadenopathy.  Skin: No visible rashes or ulcers.  Psych:  Mood & affect appropriate  LABS: All Labs Reviewed:                        9.6    16.85 )-----------( 306      ( 16 Jun 2025 06:10 )             28.6                         9.1    14.51 )-----------( 232      ( 15 Nick 2025 06:50 )             26.7     16 Jun 2025 06:10    143    |  106    |  30     ----------------------------<  106    4.0     |  30     |  0.99   15 Nick 2025 06:50    141    |  106    |  31     ----------------------------<  108    3.8     |  30     |  0.96     Ca    9.0        16 Jun 2025 06:10  Ca    8.7        15 Nick 2025 06:50  Phos  2.5       16 Jun 2025 06:10  Phos  2.6       15 Nick 2025 06:50  Mg     1.5       16 Jun 2025 06:10    TPro  5.5    /  Alb  2.8    /  TBili  0.9    /  DBili  x      /  AST  6      /  ALT  27     /  AlkPhos  40     15 Nick 2025 06:50          12 Lead ECG:   Ventricular Rate 80 BPM    Atrial Rate 80 BPM    P-R Interval 132 ms    QRS Duration 86 ms    Q-T Interval 398 ms    QTC Calculation(Bazett) 459 ms    P Axis 75 degrees    R Axis -35 degrees    T Axis 78 degrees    Diagnosis Line Sinus rhythm with premature supraventricular complexes  Left axis deviation  Possible Lateral infarct , age undetermined  Abnormal ECG  When compared with ECG of 03-JUN-2025 22:27,  premature ventricular complexes are no longer present  Confirmed by ROSALVA GOODRICH ([id number]) on 6/8/2025 9:50:39 PM (06-08-25 @ 10:58)      TRANSTHORACIC ECHOCARDIOGRAM REPORT  ________________________________________________________________________________                                      _______       Pt. Name:       MARTINEZ JONES Study Date:    6/12/2025  MRN:            [id number]        YOB: 1940  Accession #:    [id number]       Age:           84 years  Account#:       [id number]      Gender:        F  Heart Rate:                     Height:        62.99 in (160.00 cm)  Rhythm:                         Weight:        147.71 lb (67.00 kg)  Blood Pressure: 109/63 mmHg     BSA/BMI:       1.70 m² / 26.17 kg/m²  ________________________________________________________________________________________  Referring Physician:    [id number] Naresh Vitale  Interpreting Physician: Daniel Jorge M.D.  Primary Sonographer:    Jamal Hayes    CPT:               ECHO TTE WO CON COMP W DOPP - 07722.m  Indication(s):     Shock, unspecified - R57.9  Procedure:         Transthoracic echocardiogram with 2-D, M-mode and complete                     spectral and color flow Doppler.  Ordering Location: ICU1  Admission Status:  Inpatient  Study Information: Image quality for this study is technically difficult.    _______________________________________________________________________________________     CONCLUSIONS:      1. Technically difficult image quality.   2. Left ventricular systolic function is normal with an ejection fraction of 67 % by Arias's method of disks.   3. Mild left ventricular hypertrophy.   4.Normal right ventricular cavity size and probably normal right ventricular systolic function.   5. Tricuspid aortic valve with normal leaflet excursion. There is calcification of the aortic valve leaflets.   6. Moderate mitral valve leaflet calcification.   7. Mild mitral regurgitation.   8. Moderate tricuspid regurgitation.   9. The inferior vena cava is normal in size measuring 1.64 cm in diameter, (normal <2.1cm) with normal inspiratory collapse (normal >50%) consistent with normal right atrial pressure (~3, range 0-5mmHg).  10. Estimated pulmonary artery systolic pressure is 57 mmHg, consistent with moderate-to-severe pulmonary hypertension.  11. Trace pericardial effusion.  12. Right pleural effusion noted.  13. Compared to the transthoracic echocardiogram performed on 6/2/2025, there have been no significant interval changes.  ________________________________________________________________________________________  FINDINGS:     Left Ventricle:  Left ventricular systolic function is normal with a calculated ejection fraction of 67 % by the Arias's biplane method of disks. Mild left ventricular hypertrophy.     Right Ventricle:  The right ventricular cavity is normal in size and right ventricular systolic function is probablynormal. Tricuspid annular plane systolic excursion (TAPSE) is 1.9 cm (normal >=1.7 cm).     Left Atrium:  The left atrium is normal in size with an indexed volume of 29.06 ml/m².     Right Atrium:  The right atrium is normal in size with an indexed volume of 26.00 ml/m² and an indexed area of 9.41 cm²/m².     Interatrial Septum:  The interatrial septum appears intact.     Aortic Valve:  The aortic valve is tricuspid with normal leaflet excursion. There is calcification of the aortic valve leaflets. There is mild thickening of the aortic valve leaflets. There is trace aortic regurgitation.     Mitral Valve:  Structurally normal mitral valve with normal leaflet excursion. There is calcification of the mitral valve annulus. There is moderate leaflet calcification. There is mild mitral regurgitation.     Tricuspid Valve:  The tricuspid valve is structurally normal with normal leaflet excursion. There is moderate tricuspid regurgitation. Estimated pulmonary artery systolic pressure is 57 mmHg,consistent with moderate-to-severe pulmonary hypertension.     Pulmonic Valve:  The pulmonic valve was not well visualized. There is trace pulmonic regurgitation.     Aorta:  The aortic root at the sinuses of Valsalva is normal in size, measuring 3.30 cm (indexed 1.94 cm/m²). The ascending aorta is normal in size, measuring 3.00 cm (indexed 1.76 cm/m²). The aortic arch diameter is normal in size, measuring 2.4 cm (indexed 1.41 cm/m²).     Pericardium:  There is a trace pericardial effusion.     Pleura:  Right pleural effusion noted.     Systemic Veins:  The inferior vena cava is normal in size measuring 1.64 cm in diameter, (normal <2.1cm) with normal inspiratory collapse (normal >50%) consistent with normal right atrial pressure (~3, range 0-5mmHg).  ____________________________________________________________________  QUANTITATIVE DATA:  Left Ventricle Measurements: (Indexed to BSA)     IVSd (2D):   1.2 cm  LVPWd (2D):  1.1 cm  LVIDd (2D):  4.2 cm  LVIDs (2D):  2.4 cm  LV Mass:     169 g  99.5 g/m²  LV Vol d, MOD A2C: 38.9 ml 22.88 ml/m²  LV Vol d, MOD A4C: 40.9 ml 24.06 ml/m²  LV Vol d, MOD BP:  40.9 ml 24.04 ml/m²  LV Vol s, MOD A2C: 11.3 ml 6.65 ml/m²  LV Vol s, MOD A4C: 13.4 ml 7.88 ml/m²  LV Vol s, MOD BP:  13.4 ml 7.91 ml/m²  LVOT SV MOD BP:    27.4 ml  LV EF% MOD BP:     67 %     MV E Vmax:    1.54 m/s  MV A Vmax:    0.74 m/s  MV E/A:       2.08  e' lateral:   11.90 cm/s  e' medial:    8.81 cm/s  E/e' lateral: 12.94  E/e' medial:  17.48  E/e' Average: 14.87  MV DT:272 msec    Aorta Measurements: (Normal range) (Indexed to BSA)     Ao Root d     3.30 cm (2.7 - 3.3 cm) 1.94 cm/m²  Ao Asc d, 2D: 3.00  Ao Asc prox:  3.00 cm                1.76 cm/m²  Ao Arch:      2.4 cm    Left Atrium Measurements: (Indexed to BSA)  LA Diam 2D: 3.60 cm    Right Ventricle Measurements: Right Atrial Measurements:     TAPSE:            1.9 cm      RA Vol s, MOD A4C         44.2 ml  RV Base (RVID1):  2.9 cm      RA Vol s, MOD A4C i BSA   26.00 ml/m²  RV Mid (RVID2): 2.5 cm      RA Area s, MOD A4C        16.0 cm²  RV Major (RVID3): 6.4 cm      RA Area s, MOD A4C, i BSA 9.41 cm²/m²    LVOT / RVOT/ Qp/Qs Data: (Indexed to BSA)  LVOT Diameter,s: 2.20 cm  LVOT Area:       3.80 cm²    Mitral Valve Measurements:     MV E Vmax: 1.5 m/s         MR Vmax:          3.80 m/s  MV A Vmax: 0.7 m/s         MR Peak Gradient: 57.8 mmHg  MV E/A:    2.1  Tricuspid Valve Measurements:     TR Vmax:          3.7 m/s  TR Peak Gradient: 54.5 mmHg  RA Pressure:      3 mmHg  PASP:             57 mmHg    ________________________________________________________________________________________  Electronically signed on 6/13/2025 at 11:19:51 AM by Daniel Jorge M.D.         *** Final ***

## 2025-06-17 NOTE — PROGRESS NOTE ADULT - SUBJECTIVE AND OBJECTIVE BOX
[facility] -- Sai Valle, Girish Jackson Savella, , Rene Hernandez  Office # [phone]    Follow Up:      Subjective/Observations:     REVIEW OF SYSTEMS: All other review of systems is negative unless indicated above  PAST MEDICAL & SURGICAL HISTORY:  COPD (chronic obstructive pulmonary disease)      DM (diabetes mellitus)  diet-controlled      PAF (paroxysmal atrial fibrillation)  s/p ablation      Hiatal hernia      H/O aplastic anemia      History of IBS      H/O osteoporosis      HLD (hyperlipidemia)      PMR (polymyalgia rheumatica)      History of basal cell cancer      Chronic kidney disease (CKD)      Hypotension      Presence of IVC filter      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      History of immunodeficiency      Lumbar compression fracture      H/O hiatal hernia      H/O pulmonary fibrosis      H/O sinus bradycardia      History of fracture of right hip  "unoperable"      S/P hysterectomy      S/P foot surgery      S/P knee surgery      After cataract  bilateral eye cataract surgically removed      Closed right hip fracture  rigth hip ORIF july 19 2016      S/P IVC filter  nov 2016      S/P carpal tunnel release  Right 2008 & 6/27/17      H/O kyphoplasty      Port-A-Cath in place  right chest        MEDICATIONS  (STANDING):  albuterol/ipratropium for Nebulization 3 milliLiter(s) Nebulizer every 6 hours  dextrose 50% Injectable 25 Gram(s) IV Push once  dextrose 50% Injectable 12.5 Gram(s) IV Push once  dextrose 50% Injectable 25 Gram(s) IV Push once  fluticasone propionate/ salmeterol 500-50 MICROgram(s) Diskus 1 Dose(s) Inhalation two times a day  gabapentin 400 milliGRAM(s) Oral every 12 hours  hydrocortisone sodium succinate Injectable 25 milliGRAM(s) IV Push daily  insulin lispro (ADMELOG) corrective regimen sliding scale   SubCutaneous three times a day before meals  insulin lispro (ADMELOG) corrective regimen sliding scale   SubCutaneous at bedtime  pantoprazole  Injectable 40 milliGRAM(s) IV Push daily  piperacillin/tazobactam IVPB.. 3.375 Gram(s) IV Intermittent every 8 hours    MEDICATIONS  (PRN):  dextrose Oral Gel 15 Gram(s) Oral once PRN Blood Glucose LESS THAN 70 milliGRAM(s)/deciliter    Allergies    No Known Allergies    Intolerances      Vital Signs Last 24 Hrs  T(C): 36.9 (17 Jun 2025 05:29), Max: 37.4 (16 Jun 2025 12:22)  T(F): 98.4 (17 Jun 2025 05:29), Max: 99.3 (16 Jun 2025 12:22)  HR: 82 (17 Jun 2025 05:29) (78 - 104)  BP: 125/65 (17 Jun 2025 05:29) (106/62 - 151/75)  BP(mean): --  RR: 18 (17 Jun 2025 05:29) (18 - 25)  SpO2: 94% (17 Jun 2025 05:29) (92% - 100%)    Parameters below as of 17 Jun 2025 05:29  Patient On (Oxygen Delivery Method): nasal cannula  O2 Flow (L/min): 3    I&O's Summary    16 Jun 2025 07:01  -  17 Jun 2025 07:00  --------------------------------------------------------  IN: 195 mL / OUT: 0 mL / NET: 195 mL      Weight (kg): 64.4 (06-16 @ 10:58)  PHYSICAL EXAM:  TELE:   Constitutional: NAD, awake and alert, well-developed  HEENT: Moist Mucous Membranes, Anicteric  Pulmonary: Non-labored, breath sounds are clear bilaterally, No wheezing, rales or rhonchi  Cardiovascular: Regular, S1 and S2, No murmurs, rubs, gallops or clicks  Gastrointestinal: Bowel Sounds present, soft, nontender.   Lymph: No peripheral edema. No lymphadenopathy.  Skin: No visible rashes or ulcers.  Psych:  Mood & affect appropriate  LABS: All Labs Reviewed:                        9.6    16.85 )-----------( 306      ( 16 Jun 2025 06:10 )             28.6                         9.1    14.51 )-----------( 232      ( 15 Nick 2025 06:50 )             26.7                         7.3    18.77 )-----------( 241      ( 14 Jun 2025 08:13 )             23.0     16 Jun 2025 06:10    143    |  106    |  30     ----------------------------<  106    4.0     |  30     |  0.99   15 Nick 2025 06:50    141    |  106    |  31     ----------------------------<  108    3.8     |  30     |  0.96   14 Jun 2025 08:13    140    |  104    |  34     ----------------------------<  114    4.2     |  28     |  1.00     Ca    9.0        16 Jun 2025 06:10  Ca    8.7        15 Nick 2025 06:50  Ca    8.7        14 Jun 2025 08:13  Phos  2.5       16 Jun 2025 06:10  Phos  2.6       15 Nick 2025 06:50  Mg     1.5       16 Jun 2025 06:10    TPro  5.5    /  Alb  2.8    /  TBili  0.9    /  DBili  x      /  AST  6      /  ALT  27     /  AlkPhos  40     15 Nick 2025 06:50          12 Lead ECG:   Ventricular Rate 80 BPM    Atrial Rate 80 BPM    P-R Interval 132 ms    QRS Duration 86 ms    Q-T Interval 398 ms    QTC Calculation(Bazett) 459 ms    P Axis 75 degrees    R Axis -35 degrees    T Axis 78 degrees    Diagnosis Line Sinus rhythm with premature supraventricular complexes  Left axis deviation  Possible Lateral infarct , age undetermined  Abnormal ECG  When compared with ECG of 03-JUN-2025 22:27,  premature ventricular complexes are no longer present  Confirmed by ROSALVA GOODRICH ([id number]) on 6/8/2025 9:50:39 PM (06-08-25 @ 10:58)      TRANSTHORACIC ECHOCARDIOGRAM REPORT  ________________________________________________________________________________                                      _______       Pt. Name:       MARTINEZ JONES Study Date:    6/12/2025  MRN:            [id number]        YOB: 1940  Accession #:    [id number]       Age:           84 years  Account#:       [id number]      Gender:        F  Heart Rate:                     Height:        62.99 in (160.00 cm)  Rhythm:                         Weight:        147.71 lb (67.00 kg)  Blood Pressure: 109/63 mmHg     BSA/BMI:       1.70 m² / 26.17 kg/m²  ________________________________________________________________________________________  Referring Physician:    [id number] Naresh Vitale  Interpreting Physician: Daniel Jorge M.D.  Primary Sonographer:    Jamal Hayes    CPT:               ECHO TTE WO CON COMP W DOPP - 02522.m  Indication(s):     Shock, unspecified - R57.9  Procedure:         Transthoracic echocardiogram with 2-D, M-mode and complete                     spectral and color flow Doppler.  Ordering Location: ICU1  Admission Status:  Inpatient  Study Information: Image quality for this study is technically difficult.    _______________________________________________________________________________________     CONCLUSIONS:      1. Technically difficult image quality.   2. Left ventricular systolic function is normal with an ejection fraction of 67 % by Arias's method of disks.   3. Mild left ventricular hypertrophy.   4.Normal right ventricular cavity size and probably normal right ventricular systolic function.   5. Tricuspid aortic valve with normal leaflet excursion. There is calcification of the aortic valve leaflets.   6. Moderate mitral valve leaflet calcification.   7. Mild mitral regurgitation.   8. Moderate tricuspid regurgitation.   9. The inferior vena cava is normal in size measuring 1.64 cm in diameter, (normal <2.1cm) with normal inspiratory collapse (normal >50%) consistent with normal right atrial pressure (~3, range 0-5mmHg).  10. Estimated pulmonary artery systolic pressure is 57 mmHg, consistent with moderate-to-severe pulmonary hypertension.  11. Trace pericardial effusion.  12. Right pleural effusion noted.  13. Compared to the transthoracic echocardiogram performed on 6/2/2025, there have been no significant interval changes.    ________________________________________________________________________________________  FINDINGS:     Left Ventricle:  Left ventricular systolic function is normal with a calculated ejection fraction of 67 % by the Arias's biplane method of disks. Mild left ventricular hypertrophy.     Right Ventricle:  The right ventricular cavity is normal in size and right ventricular systolic function is probablynormal. Tricuspid annular plane systolic excursion (TAPSE) is 1.9 cm (normal >=1.7 cm).     Left Atrium:  The left atrium is normal in size with an indexed volume of 29.06 ml/m².     Right Atrium:  The right atrium is normal in size with an indexed volume of 26.00 ml/m² and an indexed area of 9.41 cm²/m².     Interatrial Septum:  The interatrial septum appears intact.     Aortic Valve:  The aortic valve is tricuspid with normal leaflet excursion. There is calcification of the aortic valve leaflets. There is mild thickening of the aortic valve leaflets. There is trace aortic regurgitation.     Mitral Valve:  Structurally normal mitral valve with normal leaflet excursion. There is calcification of the mitral valve annulus. There is moderate leaflet calcification. There is mild mitral regurgitation.     Tricuspid Valve:  The tricuspid valve is structurally normal with normal leaflet excursion. There is moderate tricuspid regurgitation. Estimated pulmonary artery systolic pressure is 57 mmHg,consistent with moderate-to-severe pulmonary hypertension.     Pulmonic Valve:  The pulmonic valve was not well visualized. There is trace pulmonic regurgitation.     Aorta:  The aortic root at the sinuses of Valsalva is normal in size, measuring 3.30 cm (indexed 1.94 cm/m²). The ascending aorta is normal in size, measuring 3.00 cm (indexed 1.76 cm/m²). The aortic arch diameter is normal in size, measuring 2.4 cm (indexed 1.41 cm/m²).     Pericardium:  There is a trace pericardial effusion.     Pleura:  Right pleural effusion noted.     Systemic Veins:  The inferior vena cava is normal in size measuring 1.64 cm in diameter, (normal <2.1cm) with normal inspiratory collapse (normal >50%) consistent with normal right atrial pressure (~3, range 0-5mmHg).  ____________________________________________________________________  QUANTITATIVE DATA:  Left Ventricle Measurements: (Indexed to BSA)     IVSd (2D):   1.2 cm  LVPWd (2D):  1.1 cm  LVIDd (2D):  4.2 cm  LVIDs (2D):  2.4 cm  LV Mass:     169 g  99.5 g/m²  LV Vol d, MOD A2C: 38.9 ml 22.88 ml/m²  LV Vol d, MOD A4C: 40.9 ml 24.06 ml/m²  LV Vol d, MOD BP:  40.9 ml 24.04 ml/m²  LV Vol s, MOD A2C: 11.3 ml 6.65 ml/m²  LV Vol s, MOD A4C: 13.4 ml 7.88 ml/m²  LV Vol s, MOD BP:  13.4 ml 7.91 ml/m²  LVOT SV MOD BP:    27.4 ml  LV EF% MOD BP:     67 %     MV E Vmax:    1.54 m/s  MV A Vmax:    0.74 m/s  MV E/A:       2.08  e' lateral:   11.90 cm/s  e' medial:    8.81 cm/s  E/e' lateral: 12.94  E/e' medial:  17.48  E/e' Average: 14.87  MV DT:272 msec    Aorta Measurements: (Normal range) (Indexed to BSA)     Ao Root d     3.30 cm (2.7 - 3.3 cm) 1.94 cm/m²  Ao Asc d, 2D: 3.00  Ao Asc prox:  3.00 cm                1.76 cm/m²  Ao Arch:      2.4 cm            Left Atrium Measurements: (Indexed to BSA)  LA Diam 2D: 3.60 cm         Right Ventricle Measurements: Right Atrial Measurements:     TAPSE:            1.9 cm      RA Vol s, MOD A4C         44.2 ml  RV Base (RVID1):  2.9 cm      RA Vol s, MOD A4C i BSA   26.00 ml/m²  RV Mid (RVID2): 2.5 cm      RA Area s, MOD A4C        16.0 cm²  RV Major (RVID3): 6.4 cm      RA Area s, MOD A4C, i BSA 9.41 cm²/m²    LVOT / RVOT/ Qp/Qs Data: (Indexed to BSA)  LVOT Diameter,s: 2.20 cm  LVOT Area:       3.80 cm²    Mitral Valve Measurements:     MV E Vmax: 1.5 m/s         MR Vmax:          3.80 m/s  MV A Vmax: 0.7 m/s         MR Peak Gradient: 57.8 mmHg  MV E/A:    2.1    Tricuspid Valve Measurements:     TR Vmax:          3.7 m/s  TR Peak Gradient: 54.5 mmHg  RA Pressure:      3 mmHg  PASP:             57 mmHg    ________________________________________________________________________________________  Electronically signed on 6/13/2025 at 11:19:51 AM by Daniel Jorge M.D.         *** Final ***

## 2025-06-17 NOTE — PROGRESS NOTE ADULT - ASSESSMENT
84F h/o AFib on Eliquis, COPD, CKD, aplastic anemia, polymyalgia rheumatica on chronic steroids, avascular necrosis of hips, HTN, HLD, with recent admission to [place] 5/26 - 6/5 for acute HFpEF exacerbation and COPD exacerbation, discharged to rehab on 6/5 and returning today with right sided chest pain, general malaise and feeling unwell. She reports some slight cough though otherwise no other new symptoms reported. Found to be hypotensive, febrile w/ leukocytosis. Admitted to ICU for septic shock likely 2/2 to PNA and KIMBERLY. Now stable for downgrade to tele.

## 2025-06-17 NOTE — SWALLOW BEDSIDE ASSESSMENT ADULT - COMMENTS
Chart reviewed order received for swallow eval, however, pt currently NPO for procedure, therefore unable to complete at this time.  Will follow to reattempt as schedule permits.  SENIA pack.
Per charting, "84 yr old female with PMHx afib on eliquis, COPD (not on home O2), PE/Rt lower extremity DVT 2017, Rt LE IVC filter 2017 CKD3, aplastic anemia, polymyalgia rheumatica on prednisone 5 mg po qd, requiring PRBC transfusions ~8 weeks (via Rt subclavian mediport), AVN bilat hips, HTN, HLD, HFpEF (75% 6.2.25), diastolic dysfx grade1, recent hospitalization 5/25/25-6/5/25 for AHDF/COPD exacerbation, pt also with hx of IVC filter, had Rt subclavian mediport, who was admitted on 6/8 with c/o Rt sided chest pain. general malaise. Pt stated began to feel ill evening before presentation, daughter this a.m. endorsed pt lethargic, pale. Patient was initially admitted in the ICU for hypotension requiring pressors. She is transferred out of the ICU. She still reports some R sided chest pain and SOB. Reports cough which started when she arrived in the hospital. Has some blood tinged sputum..."      CT Chest 6/10 "New multifocal infection predominantly involving the right upper lobe."      Patient seen at chairside, awake/alert and oriented, during clinical swallow evaluation this PM. Patient able to follow simple directions and answer questions. Patient denied difficulty swallowing or odynophagia. Patient was cooperative and accepted PO trials. RN reported patient has been tolerating PO meals well with no observed difficulties.

## 2025-06-17 NOTE — PROGRESS NOTE ADULT - PROBLEM SELECTOR PLAN 2
In the ED, tmax of 101 w/ KIMBERLY on CKD with Cr 1.90 (baseline 1.2-1.6), Hypotensive with SBP 80's (pt on midodrine therapy, usual 's). also w/ leukocytosis of 32.33. Procalcitonin of 13.6, RVP neg. Pt treated in ICU for septic shock requiring pressors, downgraded from the ICU to telemetry on 6/10  - CT chest 6/10 with multifocal R sided PNA  - continue zosyn; course dependent on bronch  - c/w solucortef 25ivp daily -- weaning per pulm  - quant (negative) and fungitell 52 (negative)  - MRSA/MSSA PCR negative  - trend WBC and fever curve, supplemental O2 prn to maintain SPO2 > 92% -- taper o2  - ID consulted Dr Juan A varma appreciated

## 2025-06-17 NOTE — PROGRESS NOTE ADULT - PROBLEM SELECTOR PLAN 6
(RESOLVED)   -most likely pre-renal due to hypotension   -suspect atn due to shock state  -improving indices  -avoid nephrotoxic meds  -nephro following

## 2025-06-17 NOTE — PROGRESS NOTE ADULT - SUBJECTIVE AND OBJECTIVE BOX
Patient is a 84y old  Female who presents with a chief complaint of septic shock, PNA (16 Jun 2025 07:54)    INTERVAL HPI/OVERNIGHT EVENTS: Bronch yesterday. Showed pooling of blood in RUL, no endobronchial lesions noted. Pt seen and examined at the bedside, continues to endorse hemoptysis. Denies any other concerns.     MEDICATIONS  (STANDING):  albuterol/ipratropium for Nebulization 3 milliLiter(s) Nebulizer every 6 hours  aMIOdarone    Tablet 100 milliGRAM(s) Oral daily  chlorhexidine 2% Cloths 1 Application(s) Topical <User Schedule>  dextrose 5%. 1000 milliLiter(s) (100 mL/Hr) IV Continuous <Continuous>  dextrose 5%. 1000 milliLiter(s) (50 mL/Hr) IV Continuous <Continuous>  dextrose 50% Injectable 25 Gram(s) IV Push once  dextrose 50% Injectable 12.5 Gram(s) IV Push once  dextrose 50% Injectable 25 Gram(s) IV Push once  droxidopa 100 milliGRAM(s) Oral every 8 hours  fluticasone propionate/ salmeterol 500-50 MICROgram(s) Diskus 1 Dose(s) Inhalation two times a day  gabapentin 400 milliGRAM(s) Oral every 12 hours  glucagon  Injectable 1 milliGRAM(s) IntraMuscular once  hydrocortisone sodium succinate Injectable 25 milliGRAM(s) IV Push daily  insulin lispro (ADMELOG) corrective regimen sliding scale   SubCutaneous three times a day before meals  insulin lispro (ADMELOG) corrective regimen sliding scale   SubCutaneous at bedtime  midodrine 10 milliGRAM(s) Oral every 8 hours  montelukast 10 milliGRAM(s) Oral daily  pantoprazole  Injectable 40 milliGRAM(s) IV Push daily  piperacillin/tazobactam IVPB.. 3.375 Gram(s) IV Intermittent every 8 hours  sodium chloride 0.45%. 1000 milliLiter(s) (50 mL/Hr) IV Continuous <Continuous>  tiotropium 2.5 MICROgram(s) Inhaler 2 Puff(s) Inhalation daily    MEDICATIONS  (PRN):  acetaminophen     Tablet .. 650 milliGRAM(s) Oral every 6 hours PRN Mild Pain (1 - 3)  dextrose Oral Gel 15 Gram(s) Oral once PRN Blood Glucose LESS THAN 70 milliGRAM(s)/deciliter      Allergies    No Known Allergies    Intolerances        REVIEW OF SYSTEMS: Denies other than mentioned in HPI.     Vital Signs Last 24 Hrs  T(C): 36.9 (17 Jun 2025 05:29), Max: 37.4 (16 Jun 2025 12:22)  T(F): 98.4 (17 Jun 2025 05:29), Max: 99.3 (16 Jun 2025 12:22)  HR: 82 (17 Jun 2025 05:29) (78 - 104)  BP: 125/65 (17 Jun 2025 05:29) (106/62 - 151/75)  BP(mean): --  RR: 18 (17 Jun 2025 05:29) (18 - 25)  SpO2: 94% (17 Jun 2025 05:29) (92% - 100%)    Parameters below as of 17 Jun 2025 05:29  Patient On (Oxygen Delivery Method): nasal cannula  O2 Flow (L/min): 3      PHYSICAL EXAM:  GENERAL: NAD  CHEST/LUNG:  CTA b/l, decreased breath sounds in lower regions.   HEART:  slightly irregular, no overt murmurs noted   ABDOMEN:  BS+, soft, nontender, nondistended  EXTREMITIES: +1 peripheral edema noted b/l LE  NERVOUS SYSTEM: answers questions and follows commands appropriately        LABS:  cret                        9.6    16.85 )-----------( 306      ( 16 Jun 2025 06:10 )             28.6     06-16    143  |  106  |  30[H]  ----------------------------<  106[H]  4.0   |  30  |  0.99    Ca    9.0      16 Jun 2025 06:10  Phos  2.5     06-16  Mg     1.5     06-16

## 2025-06-17 NOTE — PROGRESS NOTE ADULT - SUBJECTIVE AND OBJECTIVE BOX
[facility]  INFECTIOUS DISEASES - [address], [place] [address]  Tel: [phone]     Fax: [phone]  =======================================================    MARTINEZ JONES [id number]    Follow up: No fevers.     Allergies:  No Known Allergies      Antibiotics:  acetaminophen     Tablet .. 650 milliGRAM(s) Oral every 6 hours PRN  albuterol/ipratropium for Nebulization 3 milliLiter(s) Nebulizer every 6 hours  aMIOdarone    Tablet 100 milliGRAM(s) Oral daily  chlorhexidine 2% Cloths 1 Application(s) Topical <User Schedule>  dextrose 50% Injectable 25 Gram(s) IV Push once  dextrose 50% Injectable 12.5 Gram(s) IV Push once  dextrose 50% Injectable 25 Gram(s) IV Push once  dextrose Oral Gel 15 Gram(s) Oral once PRN  fluticasone propionate/ salmeterol 500-50 MICROgram(s) Diskus 1 Dose(s) Inhalation two times a day  gabapentin 400 milliGRAM(s) Oral every 12 hours  hydrocortisone sodium succinate Injectable 25 milliGRAM(s) IV Push daily  insulin lispro (ADMELOG) corrective regimen sliding scale   SubCutaneous three times a day before meals  insulin lispro (ADMELOG) corrective regimen sliding scale   SubCutaneous at bedtime  pantoprazole  Injectable 40 milliGRAM(s) IV Push daily  piperacillin/tazobactam IVPB.. 3.375 Gram(s) IV Intermittent every 8 hours       REVIEW OF SYSTEMS:  CONSTITUTIONAL:  No Fever or chills  HEENT:  No sore throat or runny nose.  CARDIOVASCULAR: + R sided chest pain  RESPIRATORY:  + cough, shortness of breath  GASTROINTESTINAL:  No nausea, vomiting or diarrhea.  GENITOURINARY:  No dysuria  NEUROLOGIC:  No headache or dizziness     Physical Exam:  ICU Vital Signs Last 24 Hrs  T(C): 36.7 (17 Jun 2025 11:25), Max: 36.9 (17 Jun 2025 05:29)  T(F): 98.1 (17 Jun 2025 11:25), Max: 98.4 (17 Jun 2025 05:29)  HR: 72 (17 Jun 2025 14:04) (72 - 86)  BP: 124/77 (17 Jun 2025 11:25) (116/62 - 125/65)  BP(mean): --  ABP: --  ABP(mean): --  RR: 20 (17 Jun 2025 11:25) (18 - 20)  SpO2: 94% (17 Jun 2025 14:04) (93% - 97%)    O2 Parameters below as of 17 Jun 2025 14:04  Patient On (Oxygen Delivery Method): nasal cannula           GEN: NAD  HEENT: normocephalic and atraumatic.   NECK: Supple.   LUNGS: Normal respiratory effort  HEART: Regular rate and rhythm   ABDOMEN: Soft, nontender, and nondistended.    EXTREMITIES: B/L Lower leg edema.  NEUROLOGIC: Answering questions appropriately  PSYCHIATRIC: Appropriate affect .    Labs:  06-17    142  |  106  |  35[H]  ----------------------------<  142[H]  4.3   |  30  |  1.20    Ca    8.5      17 Jun 2025 09:25  Phos  2.7     06-17  Mg     1.7     06-17    TPro  6.4  /  Alb  2.8[L]  /  TBili  0.7  /  DBili  x   /  AST  5[L]  /  ALT  21  /  AlkPhos  47  06-17                          8.1    9.69  )-----------( 330      ( 17 Jun 2025 09:25 )             25.4       Urinalysis Basic - ( 17 Jun 2025 09:25 )    Color: x / Appearance: x / SG: x / pH: x  Gluc: 142 mg/dL / Ketone: x  / Bili: x / Urobili: x   Blood: x / Protein: x / Nitrite: x   Leuk Esterase: x / RBC: x / WBC x   Sq Epi: x / Non Sq Epi: x / Bacteria: x      LIVER FUNCTIONS - ( 17 Jun 2025 09:25 )  Alb: 2.8 g/dL / Pro: 6.4 g/dL / ALK PHOS: 47 U/L / ALT: 21 U/L / AST: 5 U/L / GGT: x             RECENT CULTURES:  06-16 @ 13:09 Bronch Wash Bronchial Wash     Commensal marilee consistent with body site    Rare Squamous epithelial cells seen per low power field  No polymorphonuclear leukocytes seen per low power field  No organisms seen per oil power field      06-16 @ 11:55 Bronch Wash Bronchial Wash     Testing in progress        06-16 @ 11:53 Bronch Wash Bronchial Wash     Commensal marilee consistent with body site    Rare polymorphonuclear leukocytes per oil power field  No Squamous epithelial cells per oil power field  No organisms seen per oil power field      06-08 @ 18:25 Sputum Sputum     Commensal marilee consistent with body site    Few Squamous epithelial cells per low power field  Few polymorphonuclear leukocytes per low power field  Few Gram Positive Cocci in Pairs and Chains per oil power field      06-08 @ 15:44 Clean Catch     <10,000 CFU/mL Normal Urogenital Marilee        06-08 @ 11:20 Blood Blood-Peripheral     No growth at 5 days        06-08 @ 11:15 Blood Blood-Peripheral     No growth at 5 days              All imaging and data are reviewed.

## 2025-06-17 NOTE — PROGRESS NOTE ADULT - ASSESSMENT
REASON FOR VISIT  .. Management of problems listed below      EVENTS/CURRENT ISSUES.  . 6/16/2025 fob done oozing rul and l lo lobe post basal   . 6/14/2025 iv ufh dced   . 6/13/2025 continues to have mild hemoptysis but is also on iv ufh drip   . 6/13/2025 dw pt re rbaa of bronch and se agrees scheduled for 6/16 10 in or   . 6/13/2025 dw cardio and id   . 6/11/2025 qft funfitell and galactomannan orderd   . 6/11/2025 pt wa sstarted on iv ufh for hemoptysis and pleuritic chest pain but was stopped when vq showed vlp   . 6/10/2025 Mild hemoptysis   . 6/9 tr out of icu  . 6/8/2025  . PNEUMONIA RML 6/8/2025  . SHOCK 6/8/2025   . NOREPI 6/8/2025   . STRESS STEROIDS   .... HYDROCORTISONE 6/8/2025  . A fib (chronic) POA 6/8/2025  . ANEMIA Hb 6/8/2025 Hb 7.5  . KIMBERLY ON CKD Cr 6/8/2025 Cr 1.9        REVIEW OF SYMPTOMS   Able to give ROS  Yes     RELIABILITY +/-   CONSTITUTIONAL Weakness Yes    ENDOCRINE  No heat or cold intolerance    ALLERGY No hives  Sore throat No stridor  RESP Shortness of breath YES   NEURO New weakness No   CARDIAC   Palpitations No         PHYSICAL EXAM    HEENT Unremarkable  atraumatic   RESP Fair air entry  Harsh breath sound   CARDIAC S1 S2 No S3     NO JVD    ABDOMEN No hepatosplenomegaly   PEDAL EDEMA present No calf tenderness    REASON FOR VISIT  .. Management of problems listed below      CC.   . 6/8/2025 brought in by ems for right sided chest pain that started at 3am- nonradiating- patient was offered aspirin by ems- patient refused and stated to ems that she is on plavix and was advised not to take aspirin. patient on 43 litres nasl cannula-     OVERALL PRESENTATION.  . 6/8/2025 84 yr old female with PMHx afib on eliquis, COPD (not on home O2), PE/Rt lower extremity DVT 2017, Rt LE IVC filter 2017 CKD3, aplastic anemia, polymyalgia rheumatica on prednisone 5 mg po qd, requiring PRBC transfusions ~8 weeks (via Rt subclavian mediport), AVN bilat hips, HTN, HLD, HFpEF (75% 6.2.25), diastolic dysfx grade1, recent hospitalization 5/25/25-6/5/25 for ADHF/COPD exacerbation, Pt with eventual improvement and transfer to [facility] facility from where she presents to E.D. this a.m. 6/8/25 with c/o Rt sided chest pain. general malaise. Pt stated began to feel ill evening before presentation, daughter this a.m. endorsed pt lethargic, pale.     In E.D. Pt found to have fever with tmax of 101, KIMBERLY on CKD with sCr 1.90 (baseline 1.2-1.6), HYPOtnsive with SBP 80's (pt on midodrine therapy, usual 's). In addition found to have leukocytosis of 37.6 (baseline 5.25 6/5/25), procalcitonin of 13.6, Hgb 7.5, elevated INR 2.27,  Chest xray demonstrated RML consolidations, concerning for pneum. In E.D. pt receiving 500 cc's NS, Vanco 1 gm, zosyn. Pt received tylenol 1 gm IV x1.     PMH.        BEST PRACTICE ISSUES.  . HOB ELEVATN.    .... Yes  . DIET  .   .... DASH 6/8/2025   . FREE WATER.   ....   .  IV FLUID.  .... 6/15/2025 1/2 ns 50   ..... 6/8 lr 40 x 12 h   . PHARMAC DVT PPLX .    .... 6/12-6/14/2025 iv ufh (hospitalist)  .... 6/11/2025 [facility] 5k.2   .... 6/10/2025 4:46 PM iv ufh   .... [facility] 6/9-6/10/2025   . NON PHARMAC DVT PPLX .      . STRESS ULCR PPLX .   ....   . DATE/DM MGMT.   ..... See under Endocrine section   GENERAL DATA .   . COVID.         .... scv2 6/8/2025 scv2 (-)   . GOC.    ....    . ICU STAY.    .... 6/8-6/9   . INFECTION PPLX .   .... CHLORHEXIDINE 2% 6/8/2025   . ALLGY.   ....  nka  . WT.   .... 6/8/2025 69   . BMI.  ....6/8/2025 26      XXXXXXXXXXXXXXXXXXXXXXX  VITALS/GAS EXCHANGE/DRIPS    ABGS.     .  VS/ PO/IO/ VENT/ DRIPS.  6/17/2025 afeb 91 120/60   6/17/2025 4l 91%     XXXXXXXX[id number]  PROBLEM ASSESSMENT RECOMMENDATIONS.  RESP.   . GAS EXCHANGE .   .... target PO 90-95%     . COPD   .... ADVAIR 500 6/8/2025   .... MONTELUKAST 10 6/8/2025   .... SPIRIVA 6/8/2025     . RESP FAILURE  .... 6/8/2025 Has ho hypercapnia   .... 6/8/2025 recommend monitor abg    . MILD HEMOPTYSIS 6/10/2025   .... ct ch 6/10/2025 cw 5/31  ........ new mf infection in rul   ........ unchanged small r greater than left pl effsn   ....... enlarged pulm artery suggesting pulm hytn   .... 6/10/2025  dw hemat cardio id and instead of restarting apixa will start iv UFH which can be easily reversed in case Hb starts dropping but will be the rx for venous thromboembolism as well as for a fib   .... v duplx 6/10 (-)  .... vq 6/10 vlp   .... 6/13/2025 continues to have mild hemoptysis but is also on iv ufh drip   . 6/13/2025 dw pt re rbaa of bronch and se agrees scheduled for 6/16 10 in or   .... 6/13/2025 dw cardio and id   .... 6/16 fob done showed ooze rul and l lo lobe no eb lesions   .... 6/17/2025 continues to have mild hemoptysis   .... 6/17/2025 30% of hemoptysis cases no cause is found  Ordered gbm ab rf dsdna rf apla chapman     . PLEURITIC CHEST PAIN RO VTE   .... v duplx 6/10 (-)  .... vq 6/10 vlp   .... 6/12/2025 iv ufh was stopped 6/11 as vq vlp but was restarted by hospitalist 6/12/2025 for af       INFECTION.  . DATA  .... w 6/8-6/10-6/13-6/14-6/15-6/17/2025   .... w 13.6 - 9.2 - 15.9 - 18- 14 - 9.6   .... esr 6/8/2025 esr 17   .... w 6/8-6/9/2025 w 32 - 24   .... ua 6/8/2025 w 4   .... cxr 6/8/2025 cxr dense infiltra r upper hilum r mediport  .... ct ch 6/10 cw 5/31  ........ r greater than l effs   ........ partial rll atelectasis   ........ new patchy and nodular areas of consolidation rul and associated ground glass opacities   ........ ground glass and nodular opac also scott   ........ numerous tib rml and rll     . MICROBIO  .... sp 6/8 n commensals   .... flu ab 6/8/2025 (-)   .... rsv 6/8/2025 (-)    .... mrsa 6/9/2025 (-)  .... uc 6/8 (-)  .... bc 6/8 (-)     . PNEUMONIA RUL 6/8/2025   .... AZITHRO 6/8-6/11 /2025   .... ZOSYN 6/8-6/15/2025     CARDIAC.  . PAIN CHEST   .... 6/10/2025 co pain chest r   .... 6/10/2025 check ct to see if she has pleurisy   .... 6/10/2025  dw hemat cardio id and instead of restarting apixa will start iv UFH which can be easily reversed in case Hb starts dropping but will be the rx for venous thromboembolism as well as for a fib     . STRESS STEROIDS   .... HYDROCORTISONE   ........ 6/8/2025 h 50.4  ........ 6/10/2025 h 50.2   ........ 6/12/2025 hydrocort 50   ....... 6/14/2025 h 25  ........ 6/17/2025 dced     . SHOCK   .... MIDODRINE 6/8-6/16/2025 m 10.3   .... DROXIDOPA 6/9-6/16/2025 d 100.3   .... NOREPI 6/8-6/9/2025 n 8/250   .... target map 65 (+)     . CAD    .... Trop 6/8-6/9/2025 Tr 32- 24     . LACTICEMIA   .... la 6/8/2025 la 1.4     . CHF  .... pbnp 6/8/2025 pbnp 6599   .... tte 4/2025 mild ph  n vf  .... tte 6/2/2025   ........ ef 71%   ........ n rvsf    ........ pasp 54   ....... la mod dilatd   .... torsemide 20 6/17/2025  .... lasix 40 once 6/14/2025    . A fib (chronic) POA 6/8/2025  .... AMIODARONE 100 6/8/2025  .... 6/10/2025  dw hemat cardio id and instead of restarting apixa will start iv UFH which can be easily reversed in case Hb starts dropping but will be the rx for venous thromboembolism as well as for a fib   .... 6/12-6/14/2025 iv ufh     HEMAT.  . DATA  .... Plt 6/8/2025 plt 153   .... inr 6/8-6/9/2025 inr 2.27- 1.63      . ANEMIA   .... Hb 6/8-6/9-6/10-6/11-6/13-6/14-6/15-6/17/2025   .... Hb 7.5- 7.8 - 7.6- 7.1 - 8.4 - 7.3- 9.1 - 8.1     . TRANSFUSION  .... 6/8  1 u prbc    .... 6/14 2 u prbc     GI.   . DIET .   .... dash 6/8/2025     . LFT MONITORING   .... LFTS    6/8/2025  ........ AP     39  ........ AST   11  ........ ALT    35    RENAL.  . DATA  .... Na 6/8/2025 Na 137  .... K 6/8/2025 K 4   .... CO2 6/8/2025 co2 29   .... ag 6/8/2025 ag 9  .... alb 6/8/2025 alb 3.4     . KIMBERLY ON CKD   .... Cr 6/8-6/9-6/10-6/11-6/13-6/14/2025   .... Cr 1.9 - 1.4 - 1.3 - 1.3 - 1.1 - 1  .... Cr 5/27-5/28-5/29-5/30-6/1/2025   .... Cr 1.2 - 1.3 - 1.3 - 1.6 - 1.6    ENDO.  . DM  .  .... 6/8/2025 SL SCALE     NEURO.  . PAIN  .... GABAPENTIN 6/8/2025 g 400.2     XXXXXXXXXXXXXXXXXXXXXX   SUMMARY BASELINE .     84 yr old female pt of Dr Gallegos not on home ox or home cpap used to use trelegy lives with spouse quit 40 y 1/2 ppd with PMHx afib on eliquis, COPD (not on home O2), PE/Rt lower extremity DVT 2017, Rt LE IVC filter 2017 CKD3, aplastic anemia, polymyalgia rheumatica on prednisone 5 mg po qd, requiring PRBC transfusions around 8 weeks (via Rt subclavian mediport), AVN bilat hips, HTN, HLD, HFpEF (75% 6.2.25), diastolic dysfx grade1, recent hospitalization 5/25/25-6/5/25 for ADHF/COPD exacerbation, Pt with eventual improvement and transfer to [facility] facility   CC.   . 6/8/2025 R CHEST PAIN   MAIN ISSUES.  . COPD   . MILD HEMOPTYSOIS 6/10/2025   . PNEUMONIA RML 6/8/2025  .... ZOSYN 6/8/2025   . PLEURITIS CHEST PAIN 6/10/2025  .... 6/10 vte excluded vlp vq   . SHOCK 6/8/2025   .... resolvd tr oo icu 6/9   . NOREPI 6/8-6/9/2025   . STRESS STEROIDS   .... HYDROCORTISONE 6/8-6/17/2025  . A fib (chronic) POA 6/8/2025  .... 6/10-6/10/2025 IV UFH   .... 6/12- 6/14 IV UFH   . ANEMIA Hb 6/8-6/13/2025 Hb 7.5- 8.4  . TRANSFUSION  .... 6/8  1 u prbc    . KIMBERLY ON CKD Cr 6/8-6/13/2025 Cr 1.9- 1.1  PMH.   . AFon Eliquis,   . COPD (not on home o2),   . CKD,   . aplastic anemia,   . TRANSFUSION 6/2/2025 1 u prbc   . PMR (chronic prednisone with AVN hip),   . HLD,   . HTN,   . DM    PROCEDURES/DEVICES .  . 6/16/2025 FOB br wash rul ooze rul l lo lobe post basal     PAST PROCEDURES   . r mediport poa 6/8/2025     DISCUSSIONS.  .... Discussed with primary care and relevant consultants on an ongoing basis       TIME SPENT.  . Over 36 minutes aggregate care time spent on encounter; activities included   direct patient care, counseling and/or coordinating care reviewing notes, lab data/ imaging , discussion with multidisciplinary team/ patient  /family and explaining in detail risks, benefits, alternatives  of the recommendations     ROBERT HENRIQUEZ 84 6/8/2025 1942

## 2025-06-17 NOTE — PROGRESS NOTE ADULT - ASSESSMENT
84 female PMH of A-fib on Eliquis, COPD, CKD, aplastic anemia, polymyalgia rheumatica, on chronic steroids, avascular necrosis of hips, HLD, HTN, DM, recent admission to [place] 5/26 through 6/5/2025 for CHF/fluid overload/COPD exacerbation, presents to the ED form [facility] for evaluation of fever and generalized feeling of being unwell, found to be septic and hypotensive.     Septic shock likely 2/2 to PNA and KIMBERLY.  - s/p ICU and pressor  - CT chest 6/10 with multifocal R sided PNA  - Pulm following  - s/p bronch, 6/16.  Showed blood oozing from RUL but no lesion  - On NC at 5L/min.  Downtitrate as tolerated    - Had atypical CP, 6/11.  Appears pleuritic  - Troponin negative   - Would resume home statin for Hx HLD  - EKG showed SR with PVC LAD, possible alteral infarct     - No significant sign fluid overload on exam but with trace edema  - Would start home Torsemide 20 mg daily   - Echo 6/12/25 as above EF 67% mild LVH, mod MAC, mild MR, mod to severe pHTN    - Bp stable    - s/p IV resuscitation and IV pressor  - Continue Midodrine and Northera    - Hx of paroxysmal atrial fibrillation and DVT/PE  - Continue to hold home Eliquis/heparin gtt for hemoptysis (from Afib standpoint)  - Resume Heparin gtt per Heme  - Pls, resume home Amiodarone 100 mg daily    - Monitor and replete electrolytes. Keep K>4.0 and Mg>2.0.  - Will continue to follow    Shaneka Rogers DNP, NP-C, AGACNP-C  Cardiology   Call TEAMS          84 female PMH of A-fib on Eliquis, COPD, CKD, aplastic anemia, polymyalgia rheumatica, on chronic steroids, avascular necrosis of hips, HLD, HTN, DM, recent admission to [place] 5/26 through 6/5/2025 for CHF/fluid overload/COPD exacerbation, presents to the ED form [facility] for evaluation of fever and generalized feeling of being unwell, found to be septic and hypotensive.     Septic shock likely 2/2 to PNA and KIMBERLY.  - s/p ICU and pressor  - CT chest 6/10 with multifocal R sided PNA  - Pulm following  - s/p bronch, 6/16.  Showed blood oozing from RUL but no lesion  - On NC at 5L/min.  Downtitrate as tolerated    - Had atypical CP, 6/11.  Appears pleuritic  - Troponin negative   - Would resume home statin for Hx HLD  - EKG showed SR with PVC LAD, possible alteral infarct     - No significant sign fluid overload on exam but with trace edema  - Would start home Torsemide 20 mg daily after giving 40mg IV lasix x 1. Should have BMP done prior.   - Echo 6/12/25 as above EF 67% mild LVH, mod MAC, mild MR, mod to severe pHTN    - Bp stable    - s/p IV resuscitation and IV pressor  - Continue Midodrine and Northera    - Hx of paroxysmal atrial fibrillation and DVT/PE  - Continue to hold home Eliquis/heparin gtt for hemoptysis (from Afib standpoint)  - Resume Heparin gtt per Heme  - Pls, resume home Amiodarone 100 mg daily    - Monitor and replete electrolytes. Keep K>4.0 and Mg>2.0.  - Will continue to follow    Shaneka Rogers DNP, NP-C, AGACNP-C  Cardiology   Call TEAMS

## 2025-06-17 NOTE — PROGRESS NOTE ADULT - PROBLEM SELECTOR PLAN 10
on home prednisone 5mg daily  and leflunomide (10mg) if restarted Patient will need to bring in home medication to bring to pharmacy to dispense.  -now on IV solumedrol 25mg qd (titrating)

## 2025-06-17 NOTE — PROGRESS NOTE ADULT - PROBLEM SELECTOR PLAN 12
#VTE ppx: - Hep gtt discontinued 2/2 hemoptysis   #GI ppx: iv ppi  #Diet: Diet, Regular: DASH/TLC {Sodium & Cholesterol Restricted} (06-08-25 @ 15:56) [Active]  #Activity: Activity - Increase As Tolerated:   Time/Priority:  Routine (06-08-25 @ 14:16) [Active]  #ACP: full code  #Dispo: further plans pending clinical course #Edema:  trace edema  -Will start home Torsemide 20 mg daily tomorrow after giving 40mg IV lasix x 1 this AM. Should have BMP done prior.     #VTE ppx: - Hep gtt discontinued 2/2 hemoptysis   #GI ppx: iv ppi  #Diet: Diet, Regular: DASH/TLC {Sodium & Cholesterol Restricted} (06-08-25 @ 15:56) [Active]  #Activity: Activity - Increase As Tolerated:   Time/Priority:  Routine (06-08-25 @ 14:16) [Active]  #ACP: full code  #Dispo: further plans pending clinical course

## 2025-06-17 NOTE — PROGRESS NOTE ADULT - PROBLEM SELECTOR PLAN 1
Patient found to have hemoptysis  - Will discontinue heparin gtt  - Bronch yesterday   - Heme onc following  - ID following  - Pulm following

## 2025-06-17 NOTE — PROGRESS NOTE ADULT - SUBJECTIVE AND OBJECTIVE BOX
All interim records and events noted.    still SOB but feeling improved      MEDICATIONS  (STANDING):  albuterol/ipratropium for Nebulization 3 milliLiter(s) Nebulizer every 6 hours  aMIOdarone    Tablet 100 milliGRAM(s) Oral daily  chlorhexidine 2% Cloths 1 Application(s) Topical <User Schedule>  dextrose 50% Injectable 25 Gram(s) IV Push once  dextrose 50% Injectable 12.5 Gram(s) IV Push once  dextrose 50% Injectable 25 Gram(s) IV Push once  fluticasone propionate/ salmeterol 500-50 MICROgram(s) Diskus 1 Dose(s) Inhalation two times a day  gabapentin 400 milliGRAM(s) Oral every 12 hours  hydrocortisone sodium succinate Injectable 25 milliGRAM(s) IV Push daily  insulin lispro (ADMELOG) corrective regimen sliding scale   SubCutaneous three times a day before meals  insulin lispro (ADMELOG) corrective regimen sliding scale   SubCutaneous at bedtime  pantoprazole  Injectable 40 milliGRAM(s) IV Push daily  piperacillin/tazobactam IVPB.. 3.375 Gram(s) IV Intermittent every 8 hours    MEDICATIONS  (PRN):  acetaminophen     Tablet .. 650 milliGRAM(s) Oral every 6 hours PRN Mild Pain (1 - 3)  dextrose Oral Gel 15 Gram(s) Oral once PRN Blood Glucose LESS THAN 70 milliGRAM(s)/deciliter      Vital Signs Last 24 Hrs  T(C): 36.7 (17 Jun 2025 11:25), Max: 36.9 (17 Jun 2025 05:29)  T(F): 98.1 (17 Jun 2025 11:25), Max: 98.4 (17 Jun 2025 05:29)  HR: 82 (17 Jun 2025 11:25) (78 - 86)  BP: 124/77 (17 Jun 2025 11:25) (116/62 - 125/65)  BP(mean): --  RR: 20 (17 Jun 2025 11:25) (18 - 20)  SpO2: 95% (17 Jun 2025 11:25) (93% - 97%)    Parameters below as of 17 Jun 2025 11:25  Patient On (Oxygen Delivery Method): nasal cannula  O2 Flow (L/min): 3.5      PHYSICAL EXAM  General: in no acute distress  Head: atraumatic, normocephalic  ENT: sclera anicteric, buccal mucosa moist  Neck: supple, trachea midline, no palpable masses  CV: S1 S2, regular rate and rhythm  Lungs: clear to auscultation, no wheezes/rhonchi  Abdomen: soft, nontender, bowel sounds present, no palpable ,sddrd  Extrem: no clubbing/cyanosis/edema  Skin: no significant increased ecchymosis/petechiae  Neuro: alert and oriented X3,  no focal deficits      LABS:             8.1    9.69  )-----------( 330      ( 06-17 @ 09:25 )             25.4                9.6    16.85 )-----------( 306      ( 06-16 @ 06:10 )             28.6                9.1    14.51 )-----------( 232      ( 06-15 @ 06:50 )             26.7       06-17    142  |  106  |  35[H]  ----------------------------<  142[H]  4.3   |  30  |  1.20    Ca    8.5      17 Jun 2025 09:25  Phos  2.7     06-17  Mg     1.7     06-17    TPro  6.4  /  Alb  2.8[L]  /  TBili  0.7  /  DBili  x   /  AST  5[L]  /  ALT  21  /  AlkPhos  47  06-17 06-15 @ 06:50  PT15.0 INR1.27  PTT--  06-13 @ 07:30  PT-- INR--  PTT68.8  06-12 @ 23:36  PT-- INR--  PTT79.6  06-12 @ 17:14  PT-- INR--  PTT74.1      RADIOLOGY & ADDITIONAL STUDIES:    IMPRESSION/RECOMMENDATIONS:

## 2025-06-17 NOTE — PROGRESS NOTE ADULT - ASSESSMENT
84 yr old female with PMHx afib on eliquis, COPD (not on home O2), PE/Rt lower extremity DVT 2017, Rt LE IVC filter 2017 CKD3, aplastic anemia, polymyalgia rheumatica on prednisone 5 mg po qd, requiring PRBC transfusions ~8 weeks (via Rt subclavian mediport), AVN bilat hips, HTN, HLD, HFpEF (75% 6.2.25), diastolic dysfx grade1, recent hospitalization 5/25/25-6/5/25 for AHDF/COPD exacerbation, pt also with hx of IVC filter, had Rt subclavian mediport, who was admitted on 6/8 with c/o Rt sided chest pain. general malaise. Pt stated began to feel ill evening before presentation, daughter this a.m. endorsed pt lethargic, pale. Patient was initially admitted in the ICU for hypotension requiring pressors. She is transferred out of the ICU. She still reports some R sided chest pain and SOB. Reports cough which started when she arrived in the hospital. Has some blood tinged sputum.    ID consulted for pneumonia. CT chest showed new multifocal infection predominantly involving the right upper lobe. Suspect respiratory symptoms multifactorial in the setting of CHF, anemia. Blood cultures remain no growth. COVID/FLU/RSV negative. MRSA nasal PCR, urine strep and legionella antigen negative. Respiratory culture grew commensal marilee consistent with body site. Serum Fungitell and galactomannan low. Quantiferon indeterminate, but likely due to underlying immunosuppression.    S/p bronchoscopy on 6/16. Had oozing blood from RUL but no endobronchial lesions noted. No fever and leukocytosis resolved. BAL AFB gram stain negative, culture growing commensal marilee consistent with body site.    #R sided pneumonia  #Hemoptysis  #Acute hypoxic respiratory failure  #Leukocytosis    -disontinue Zosyn at the end of today to complete 10 days, then observe off antibiotics  -follow cultures to completion    Kimberly Velazco MD  Division of Infectious Diseases   Cell [phone] between 8am and 6pm   After 6pm and weekends please call ID service at [phone].     35 minutes spent on total encounter assessing patient, examination, chart review, counseling and coordinating care by the attending physician/nurse/care manager.

## 2025-06-17 NOTE — SWALLOW BEDSIDE ASSESSMENT ADULT - SWALLOW EVAL: DIAGNOSIS
1) Functional oral stage of swallow for regular solids, puree, and thin liquids marked by adequate acceptance, adequate bolus manipulation and posterior transfer with oral clearance. 2) Functional  pharyngeal stage of swallow for regular solids, puree, and thin liquids marked by  present pharyngeal swallow trigger and hyolaryngeal excursion noted upon digital palpation. No overt signs/symptoms of penetration/aspiration noted.

## 2025-06-17 NOTE — SWALLOW BEDSIDE ASSESSMENT ADULT - ADDITIONAL RECOMMENDATIONS
This department to follow up as schedule permits to assess for diet tolerance, and/or need for objective assessment. Medical team further advised to reconsult if there is a change in medical status and/or observed change in patient's tolerance of recommended PO diet.

## 2025-06-18 LAB
ALBUMIN SERPL ELPH-MCNC: 3 G/DL — LOW (ref 3.3–5)
ALP SERPL-CCNC: 46 U/L — SIGNIFICANT CHANGE UP (ref 40–120)
ALT FLD-CCNC: 23 U/L — SIGNIFICANT CHANGE UP (ref 12–78)
ANION GAP SERPL CALC-SCNC: 6 MMOL/L — SIGNIFICANT CHANGE UP (ref 5–17)
AST SERPL-CCNC: <3 U/L — LOW (ref 15–37)
B2 GLYCOPROT1 IGA SER QL: 4 U/ML — SIGNIFICANT CHANGE UP
B2 GLYCOPROT1 IGG SER-ACNC: <1.4 U/ML — SIGNIFICANT CHANGE UP
B2 GLYCOPROT1 IGM SER-ACNC: <1.5 U/ML — SIGNIFICANT CHANGE UP
BASOPHILS # BLD AUTO: 0.01 K/UL — SIGNIFICANT CHANGE UP (ref 0–0.2)
BASOPHILS NFR BLD AUTO: 0.1 % — SIGNIFICANT CHANGE UP (ref 0–2)
BILIRUB SERPL-MCNC: 1 MG/DL — SIGNIFICANT CHANGE UP (ref 0.2–1.2)
BLD GP AB SCN SERPL QL: SIGNIFICANT CHANGE UP
BUN SERPL-MCNC: 29 MG/DL — HIGH (ref 7–23)
CALCIUM SERPL-MCNC: 9 MG/DL — SIGNIFICANT CHANGE UP (ref 8.5–10.1)
CARDIOLIPIN IGM SER-MCNC: <1.5 MPL U/ML — SIGNIFICANT CHANGE UP
CARDIOLIPIN IGM SER-MCNC: <1.6 GPL U/ML — SIGNIFICANT CHANGE UP
CHLORIDE SERPL-SCNC: 104 MMOL/L — SIGNIFICANT CHANGE UP (ref 96–108)
CO2 SERPL-SCNC: 34 MMOL/L — HIGH (ref 22–31)
CREAT SERPL-MCNC: 0.97 MG/DL — SIGNIFICANT CHANGE UP (ref 0.5–1.3)
CULTURE RESULTS: SIGNIFICANT CHANGE UP
CULTURE RESULTS: SIGNIFICANT CHANGE UP
DEPRECATED CARDIOLIPIN IGA SER: 3.1 APL U/ML — SIGNIFICANT CHANGE UP
DSDNA AB SER-ACNC: <1 IU/ML — SIGNIFICANT CHANGE UP
EGFR: 58 ML/MIN/1.73M2 — LOW
EGFR: 58 ML/MIN/1.73M2 — LOW
EOSINOPHIL # BLD AUTO: 0 K/UL — SIGNIFICANT CHANGE UP (ref 0–0.5)
EOSINOPHIL NFR BLD AUTO: 0 % — SIGNIFICANT CHANGE UP (ref 0–6)
GBM IGG SER-ACNC: <0.2 AI — SIGNIFICANT CHANGE UP
GLOMERULAR BASEMENT MEMBRANE A INTERPRETATION: NEGATIVE — SIGNIFICANT CHANGE UP
GLUCOSE BLDC GLUCOMTR-MCNC: 121 MG/DL — HIGH (ref 70–99)
GLUCOSE BLDC GLUCOMTR-MCNC: 127 MG/DL — HIGH (ref 70–99)
GLUCOSE BLDC GLUCOMTR-MCNC: 170 MG/DL — HIGH (ref 70–99)
GLUCOSE SERPL-MCNC: 129 MG/DL — HIGH (ref 70–99)
HCT VFR BLD CALC: 26.2 % — LOW (ref 34.5–45)
HGB BLD-MCNC: 8.5 G/DL — LOW (ref 11.5–15.5)
IMM GRANULOCYTES # BLD AUTO: 0.26 K/UL — HIGH (ref 0–0.07)
IMM GRANULOCYTES NFR BLD AUTO: 2.2 % — HIGH (ref 0–0.9)
INR BLD: 1.36 RATIO — HIGH (ref 0.85–1.16)
LYMPHOCYTES # BLD AUTO: 0.26 K/UL — LOW (ref 1–3.3)
LYMPHOCYTES NFR BLD AUTO: 2.2 % — LOW (ref 13–44)
MAGNESIUM SERPL-MCNC: 1.3 MG/DL — LOW (ref 1.6–2.6)
MCHC RBC-ENTMCNC: 31.1 PG — SIGNIFICANT CHANGE UP (ref 27–34)
MCHC RBC-ENTMCNC: 32.4 G/DL — SIGNIFICANT CHANGE UP (ref 32–36)
MCV RBC AUTO: 96 FL — SIGNIFICANT CHANGE UP (ref 80–100)
MONOCYTES # BLD AUTO: 1.72 K/UL — HIGH (ref 0–0.9)
MONOCYTES NFR BLD AUTO: 14.4 % — HIGH (ref 2–14)
NEUTROPHILS # BLD AUTO: 9.73 K/UL — HIGH (ref 1.8–7.4)
NEUTROPHILS NFR BLD AUTO: 81.1 % — HIGH (ref 43–77)
NRBC # BLD AUTO: 0.02 K/UL — HIGH (ref 0–0)
NRBC # FLD: 0.02 K/UL — HIGH (ref 0–0)
NRBC BLD AUTO-RTO: 0 /100 WBCS — SIGNIFICANT CHANGE UP (ref 0–0)
PHOSPHATE SERPL-MCNC: 2.3 MG/DL — LOW (ref 2.5–4.5)
PLATELET # BLD AUTO: 323 K/UL — SIGNIFICANT CHANGE UP (ref 150–400)
PMV BLD: 10.8 FL — SIGNIFICANT CHANGE UP (ref 7–13)
POTASSIUM SERPL-MCNC: 3.8 MMOL/L — SIGNIFICANT CHANGE UP (ref 3.5–5.3)
POTASSIUM SERPL-SCNC: 3.8 MMOL/L — SIGNIFICANT CHANGE UP (ref 3.5–5.3)
PROT SERPL-MCNC: 6.8 G/DL — SIGNIFICANT CHANGE UP (ref 6–8.3)
PROTHROM AB SERPL-ACNC: 15.9 SEC — HIGH (ref 9.9–13.4)
RBC # BLD: 2.73 M/UL — LOW (ref 3.8–5.2)
RBC # FLD: 19.7 % — HIGH (ref 10.3–14.5)
RHEUMATOID FACT SERPL-ACNC: <10 IU/ML — SIGNIFICANT CHANGE UP (ref 0–13)
SODIUM SERPL-SCNC: 144 MMOL/L — SIGNIFICANT CHANGE UP (ref 135–145)
SPECIMEN SOURCE: SIGNIFICANT CHANGE UP
SPECIMEN SOURCE: SIGNIFICANT CHANGE UP
WBC # BLD: 11.98 K/UL — HIGH (ref 3.8–10.5)
WBC # FLD AUTO: 11.98 K/UL — HIGH (ref 3.8–10.5)

## 2025-06-18 PROCEDURE — 99231 SBSQ HOSP IP/OBS SF/LOW 25: CPT

## 2025-06-18 PROCEDURE — 99233 SBSQ HOSP IP/OBS HIGH 50: CPT

## 2025-06-18 PROCEDURE — G0545: CPT

## 2025-06-18 PROCEDURE — 99233 SBSQ HOSP IP/OBS HIGH 50: CPT | Mod: GC

## 2025-06-18 RX ORDER — MAGNESIUM SULFATE 500 MG/ML
1 SYRINGE (ML) INJECTION
Refills: 0 | Status: DISCONTINUED | OUTPATIENT
Start: 2025-06-18 | End: 2025-06-18

## 2025-06-18 RX ORDER — MAGNESIUM SULFATE 500 MG/ML
2 SYRINGE (ML) INJECTION EVERY 6 HOURS
Refills: 0 | Status: COMPLETED | OUTPATIENT
Start: 2025-06-18 | End: 2025-06-18

## 2025-06-18 RX ORDER — SOD PHOS DI, MONO/K PHOS MONO 250 MG
1 TABLET ORAL ONCE
Refills: 0 | Status: COMPLETED | OUTPATIENT
Start: 2025-06-18 | End: 2025-06-18

## 2025-06-18 RX ORDER — PREDNISONE 20 MG/1
5 TABLET ORAL DAILY
Refills: 0 | Status: DISCONTINUED | OUTPATIENT
Start: 2025-06-18 | End: 2025-06-22

## 2025-06-18 RX ORDER — ATORVASTATIN CALCIUM 80 MG/1
10 TABLET, FILM COATED ORAL AT BEDTIME
Refills: 0 | Status: DISCONTINUED | OUTPATIENT
Start: 2025-06-18 | End: 2025-06-22

## 2025-06-18 RX ADMIN — IPRATROPIUM BROMIDE AND ALBUTEROL SULFATE 3 MILLILITER(S): .5; 2.5 SOLUTION RESPIRATORY (INHALATION) at 20:06

## 2025-06-18 RX ADMIN — Medication 25 GRAM(S): at 15:55

## 2025-06-18 RX ADMIN — Medication 40 MILLIGRAM(S): at 10:00

## 2025-06-18 RX ADMIN — GABAPENTIN 400 MILLIGRAM(S): 400 CAPSULE ORAL at 05:13

## 2025-06-18 RX ADMIN — Medication 1 DOSE(S): at 05:12

## 2025-06-18 RX ADMIN — IPRATROPIUM BROMIDE AND ALBUTEROL SULFATE 3 MILLILITER(S): .5; 2.5 SOLUTION RESPIRATORY (INHALATION) at 13:55

## 2025-06-18 RX ADMIN — Medication 1 DOSE(S): at 21:42

## 2025-06-18 RX ADMIN — IPRATROPIUM BROMIDE AND ALBUTEROL SULFATE 3 MILLILITER(S): .5; 2.5 SOLUTION RESPIRATORY (INHALATION) at 01:13

## 2025-06-18 RX ADMIN — ATORVASTATIN CALCIUM 10 MILLIGRAM(S): 80 TABLET, FILM COATED ORAL at 21:42

## 2025-06-18 RX ADMIN — TORSEMIDE 20 MILLIGRAM(S): 20 TABLET ORAL at 05:11

## 2025-06-18 RX ADMIN — GABAPENTIN 400 MILLIGRAM(S): 400 CAPSULE ORAL at 17:07

## 2025-06-18 RX ADMIN — AMIODARONE HYDROCHLORIDE 100 MILLIGRAM(S): 50 INJECTION, SOLUTION INTRAVENOUS at 05:11

## 2025-06-18 RX ADMIN — IPRATROPIUM BROMIDE AND ALBUTEROL SULFATE 3 MILLILITER(S): .5; 2.5 SOLUTION RESPIRATORY (INHALATION) at 08:03

## 2025-06-18 RX ADMIN — INSULIN LISPRO 1: 100 INJECTION, SOLUTION INTRAVENOUS; SUBCUTANEOUS at 12:17

## 2025-06-18 RX ADMIN — Medication 1 APPLICATION(S): at 05:14

## 2025-06-18 RX ADMIN — Medication 25 GRAM(S): at 09:59

## 2025-06-18 RX ADMIN — Medication 1 PACKET(S): at 09:59

## 2025-06-18 NOTE — PROGRESS NOTE ADULT - PROBLEM SELECTOR PLAN 1
Patient found to have hemoptysis  - Will discontinue heparin gtt  - Bronch yesterday   - Heme onc following  - ID following  - Pulm following Patient found to have hemoptysis  - Will discontinue heparin gtt  - Bronch yesterday   - Heme onc following  - ID following  - Pulm following post bronch - neg cult

## 2025-06-18 NOTE — PROGRESS NOTE ADULT - ASSESSMENT
84F h/o AFib on Eliquis, COPD, CKD, aplastic anemia, polymyalgia rheumatica on chronic steroids, avascular necrosis of hips, HTN, HLD, with recent admission to [facility] 5/26 - 6/5 for acute HFpEF exacerbation and COPD exacerbation, discharged to rehab on 6/5 and returning today with right sided chest pain, general malaise and feeling unwell. She reports some slight cough though otherwise no other new symptoms reported. Found to be hypotensive, febrile w/ leukocytosis. Admitted to ICU for septic shock likely 2/2 to PNA and KIMBERLY. Now stable for downgrade to tele.

## 2025-06-18 NOTE — PROGRESS NOTE ADULT - ASSESSMENT
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   Recently DC to [facility]    Readmitted with pneumonia, sepsis to MICU.   Transferred to medical unit 06/10/25  Hgb 9.1 today    Hgb declined during last admission s/p transfusion  2U PRBC 06/14  1U PRBC 06/11  1U PRBC 06/08  1U PRBC 06/02    RECOMMENDATIONS    Anemia  follow CBC and transfuse as indicated    Afib and hemoptysis  heparin has been discontinued  continue present cardiac management  follow INR    DVT hx  has IVC filter  no PE this admission    Multifocal PNA and hemoptysis  s/p bronchoscopy Tues     d/w Dr CHRIS Pepe

## 2025-06-18 NOTE — PROGRESS NOTE ADULT - SUBJECTIVE AND OBJECTIVE BOX
[INTERVAL HX: ]  Patient seen and examined;  Chart reviewed and events noted;       [MEDICATIONS]  MEDICATIONS  (STANDING):  albuterol/ipratropium for Nebulization 3 milliLiter(s) Nebulizer every 6 hours  aMIOdarone    Tablet 100 milliGRAM(s) Oral daily  chlorhexidine 2% Cloths 1 Application(s) Topical <User Schedule>  dextrose 50% Injectable 25 Gram(s) IV Push once  dextrose 50% Injectable 12.5 Gram(s) IV Push once  dextrose 50% Injectable 25 Gram(s) IV Push once  fluticasone propionate/ salmeterol 500-50 MICROgram(s) Diskus 1 Dose(s) Inhalation two times a day  gabapentin 400 milliGRAM(s) Oral every 12 hours  insulin lispro (ADMELOG) corrective regimen sliding scale   SubCutaneous three times a day before meals  insulin lispro (ADMELOG) corrective regimen sliding scale   SubCutaneous at bedtime  magnesium sulfate  IVPB 2 Gram(s) IV Intermittent every 6 hours  pantoprazole  Injectable 40 milliGRAM(s) IV Push daily  predniSONE   Tablet 5 milliGRAM(s) Oral daily  torsemide 20 milliGRAM(s) Oral daily    MEDICATIONS  (PRN):  acetaminophen     Tablet .. 650 milliGRAM(s) Oral every 6 hours PRN Mild Pain (1 - 3)  dextrose Oral Gel 15 Gram(s) Oral once PRN Blood Glucose LESS THAN 70 milliGRAM(s)/deciliter    [VITALS]  Vital Signs Last 24 Hrs  T(C): 37.1 (2025 11:40), Max: 37.1 (2025 05:39)  T(F): 98.7 (2025 11:40), Max: 98.7 (2025 05:39)  HR: 82 (2025 11:40) (72 - 91)  BP: 101/58 (2025 11:40) (101/58 - 132/82)  BP(mean): --  RR: 20 (2025 11:40) (18 - 20)  SpO2: 100% (2025 11:40) (91% - 100%)    Parameters below as of 2025 11:40  Patient On (Oxygen Delivery Method): nasal cannula  O2 Flow (L/min): 5    [WT/HT]  Daily     Daily Weight in k.3 (2025 05:39)  [VENT]    [PHYSICAL EXAM]  GEN: NAD  HEENT: normocephalic and atraumatic. EOMI. PERRL.    NECK: Supple.  No lymphadenopathy   LUNGS: Clear to auscultation.  HEART: Regular rate and rhythm,  no MRG  ABDOMEN: Soft, nontender, and nondistended.  Positive bowel sounds.    : No CVA tenderness  EXTREMITIES: Without edema.  NEUROLOGIC: grossly intact.  PSYCHIATRIC: Appropriate affect .  SKIN: No rash     [LABS:]                        8.5    11.98 )-----------( 323      ( 2025 07:50 )             26.2     06-18  144  |  104  |  29[H]  ----------------------------<  129[H]  3.8   |  34[H]  |  0.97    Ca    9.0      2025 07:50  Phos  2.3     -  Mg     1.3     -    TPro  6.8  /  Alb  3.0[L]  /  TBili  1.0  /  DBili  x   /  AST  <3[L]  /  ALT  23  /  AlkPhos  46  -18    PT/INR - ( 2025 07:50 )   PT: 15.9 sec;   INR: 1.36 ratio         Sedimentation Rate, Erythrocyte: 17 mm/hr [0 - 20] (25 @ 11:20)  Folate, Serum: >20.0 ng/mL (25 @ 09:20)  Vitamin B12, Serum: 1444 pg/mL *H* [232 - 1245] (25 @ 09:20)  Iron - Total Binding Capacity.: Unable to calculate Test Repeated ug/dL [220 - 430] (23 @ 14:00)  Ferritin: 1342 ng/mL *H* [13 - 330] (23 @ 14:00)    Reticulocyte Count (23 @ 14:00)  Reticulocyte Percent: 2.0 % [0.5 - 2.5]  Absolute Reticulocytes: 39.0 K/uL [25.0 - 125.0]    Vitamin B12, Serum: 663 pg/mL [232 - 1245] (23 @ 14:00)  Folate, Serum: 16.2 ng/mL (23 @ 14:00)    Serum Protein Electrophoresis Interp: Normal Electrophoresis Pattern (23 @ 14:00)  Immunofixation, Serum: No Monoclonal Band Identified  Reference Range: None Detected (23 @ 14:00)    Urinalysis Basic - ( 2025 07:50 )  Color: x / Appearance: x / SG: x / pH: x  Gluc: 129 mg/dL / Ketone: x  / Bili: x / Urobili: x   Blood: x / Protein: x / Nitrite: x   Leuk Esterase: x / RBC: x / WBC x   Sq Epi: x / Non Sq Epi: x / Bacteria: x    Culture - Bronchial (collected 2025 13:09)  Source: Bronch Wash Bronchial Wash  Gram Stain (2025 21:58):    Rare Squamous epithelial cells seen per low power field    No polymorphonuclear leukocytes seen per low power field    No organisms seen per oil power field  Final Report (2025 10:28):    Commensal marilee consistent with body site    Culture - Legionella (collected 2025 11:55)  Source: Legionella  Preliminary Report (2025 23:01):    No Legionella species isolated to date    Culture - Fungal, Bronchial (collected 2025 11:55)  Source: Bronchial Bronchial Wash  Preliminary Report (2025 05:35):    No growth    Culture - Acid Fast - Bronchial w/Smear (collected 2025 11:55)  Source: Bronch Wash Bronchial Wash    Culture - Bronchial (collected 2025 11:53)  Source: Bronch Wash Bronchial Wash  Gram Stain (2025 00:37):    Rare polymorphonuclear leukocytes per oil power field    No Squamous epithelial cells per oil power field    No organisms seen per oil power field  Final Report (2025 08:08):    Commensal marilee consistent with body site    SARS-CoV-2: NotDetec (26 May 2025 18:00)    Culture - Bronchial (collected 2025 13:09)  Source: Bronch Wash Bronchial Wash  Gram Stain (2025 21:58):    Rare Squamous epithelial cells seen per low power field    No polymorphonuclear leukocytes seen per low power field    No organisms seen per oil power field  Final Report (2025 10:28):    Commensal marilee consistent with body site    Culture - Legionella (collected 2025 11:55)  Source: Legionella  Preliminary Report (2025 23:01):    No Legionella species isolated to date    Culture - Fungal, Bronchial (collected 2025 11:55)  Source: Bronchial Bronchial Wash  Preliminary Report (2025 05:35):    No growth    Culture - Acid Fast - Bronchial w/Smear (collected 2025 11:55)  Source: Bronch Wash Bronchial Wash    Culture - Bronchial (collected 2025 11:53)  Source: Bronch Wash Bronchial Wash  Gram Stain (2025 00:37):    Rare polymorphonuclear leukocytes per oil power field    No Squamous epithelial cells per oil power field    No organisms seen per oil power field  Final Report (2025 08:08):    Commensal marilee consistent with body site        [RADIOLOGY STUDIES:]

## 2025-06-18 NOTE — PROGRESS NOTE ADULT - PROBLEM SELECTOR PLAN 2
In the ED, tmax of 101 w/ KIMBERLY on CKD with Cr 1.90 (baseline 1.2-1.6), Hypotensive with SBP 80's (pt on midodrine therapy, usual 's). also w/ leukocytosis of 32.33. Procalcitonin of 13.6, RVP neg. Pt treated in ICU for septic shock requiring pressors, downgraded from the ICU to telemetry on 6/10  - CT chest 6/10 with multifocal R sided PNA  - continue zosyn; course dependent on bronch  - c/w solucortef 25ivp daily -- weaning per pulm  - quant (negative) and fungitell 52 (negative)  - MRSA/MSSA PCR negative  - trend WBC and fever curve, supplemental O2 prn to maintain SPO2 > 92% -- taper o2  - ID consulted Dr Juan A varma appreciated In the ED, tmax of 101 w/ KIMBERLY on CKD with Cr 1.90 (baseline 1.2-1.6), Hypotensive with SBP 80's (pt on midodrine therapy, usual 's). also w/ leukocytosis of 32.33. Procalcitonin of 13.6, RVP neg. Pt treated in ICU for septic shock requiring pressors, downgraded from the ICU to telemetry on 6/10  - CT chest 6/10 with multifocal R sided PNA  - completed zosyn on 06/18; monitor off abx   - completed weaning steroids on 06/18,  c/w home prednisone   - quant (negative) and fungitell 52 (negative)  - MRSA/MSSA PCR negative  - trend WBC and fever curve, supplemental O2 prn to maintain SPO2 > 92% -- taper o2  - ID consulted Dr Juan A varma appreciated

## 2025-06-18 NOTE — PROGRESS NOTE ADULT - PROBLEM SELECTOR PLAN 10
on home prednisone 5mg daily  and leflunomide (10mg) if restarted Patient will need to bring in home medication to bring to pharmacy to dispense.  -now on IV solumedrol 25mg qd (titrating) on home prednisone 5mg daily  and leflunomide (10mg) if restarted Patient will need to bring in home medication to bring to pharmacy to dispense.  -continue home prednisone

## 2025-06-18 NOTE — PROGRESS NOTE ADULT - SUBJECTIVE AND OBJECTIVE BOX
[facility]                                                       Dr. Chisholm, Dr. Franklin, Dr. Baron, Dr. Clark, Dr. Stock                                      [facility], [facility], [address]                                                 [address] [place] [address]                                      Ph: [phone]  Fax: [phone]                                         Ph: [phone]  Fax: [phone]      Patient is a 84y old  Female who presents with a chief complaint of septic shock, PNA (09 Jun 2025 12:36)  Patient seen in follow up for KIMBERLY on CKD>        PAST MEDICAL HISTORY:  COPD (chronic obstructive pulmonary disease)    DM (diabetes mellitus)    Pulmonary fibrosis    PAF (paroxysmal atrial fibrillation)    Hiatal hernia    GIB (gastrointestinal bleeding)    Pericarditis    Shoulder fracture, right    Hematoma    H/O aplastic anemia    History of IBS    H/O osteoporosis    HLD (hyperlipidemia)    PMR (polymyalgia rheumatica)    History of basal cell cancer    Chronic kidney disease (CKD)    Hypotension    Presence of IVC filter    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    History of immunodeficiency    Lumbar compression fracture    H/O hiatal hernia    H/O pulmonary fibrosis    H/O sinus bradycardia    History of fracture of right hip      MEDICATIONS  (STANDING):  albuterol/ipratropium for Nebulization 3 milliLiter(s) Nebulizer every 6 hours  aMIOdarone    Tablet 100 milliGRAM(s) Oral daily  chlorhexidine 2% Cloths 1 Application(s) Topical <User Schedule>  dextrose 50% Injectable 25 Gram(s) IV Push once  dextrose 50% Injectable 12.5 Gram(s) IV Push once  dextrose 50% Injectable 25 Gram(s) IV Push once  fluticasone propionate/ salmeterol 500-50 MICROgram(s) Diskus 1 Dose(s) Inhalation two times a day  gabapentin 400 milliGRAM(s) Oral every 12 hours  insulin lispro (ADMELOG) corrective regimen sliding scale   SubCutaneous three times a day before meals  insulin lispro (ADMELOG) corrective regimen sliding scale   SubCutaneous at bedtime  magnesium sulfate  IVPB 2 Gram(s) IV Intermittent every 6 hours  pantoprazole  Injectable 40 milliGRAM(s) IV Push daily  torsemide 20 milliGRAM(s) Oral daily    MEDICATIONS  (PRN):  acetaminophen     Tablet .. 650 milliGRAM(s) Oral every 6 hours PRN Mild Pain (1 - 3)  dextrose Oral Gel 15 Gram(s) Oral once PRN Blood Glucose LESS THAN 70 milliGRAM(s)/deciliter    T(C): 37.1 (06-18-25 @ 05:39), Max: 37.4 (06-16-25 @ 12:22)  HR: 89 (06-18-25 @ 05:39) (72 - 104)  BP: 132/82 (06-18-25 @ 05:39) (106/62 - 136/97)  RR: 18 (06-18-25 @ 05:39)  SpO2: 96% (06-18-25 @ 05:39)  Wt(kg): --  I&O's Detail    17 Jun 2025 07:01  -  18 Jun 2025 07:00  --------------------------------------------------------  IN:    IV PiggyBack: 100 mL    Oral Fluid: 420 mL  Total IN: 520 mL    OUT:    Voided (mL): 400 mL  Total OUT: 400 mL    Total NET: 120 mL      18 Jun 2025 07:01  -  18 Jun 2025 10:26  --------------------------------------------------------  IN:  Total IN: 0 mL    OUT:    Voided (mL): 700 mL  Total OUT: 700 mL    Total NET: -700 mL                    PHYSICAL EXAM:  General: No distress  Respiratory: b/l air entry  Cardiovascular: S1 S2  Gastrointestinal: soft  Extremities:  edema          LABORATORY:                        8.5    11.98 )-----------( 323      ( 18 Jun 2025 07:50 )             26.2     06-18    144  |  104  |  29[H]  ----------------------------<  129[H]  3.8   |  34[H]  |  0.97    Ca    9.0      18 Jun 2025 07:50  Phos  2.3     06-18  Mg     1.3     06-18    TPro  6.8  /  Alb  3.0[L]  /  TBili  1.0  /  DBili  x   /  AST  <3[L]  /  ALT  23  /  AlkPhos  46  06-18    Sodium: 144 mmol/L (06-18 @ 07:50)  Sodium: 142 mmol/L (06-17 @ 09:25)    Potassium: 3.8 mmol/L (06-18 @ 07:50)  Potassium: 4.3 mmol/L (06-17 @ 09:25)    Hemoglobin: 8.5 g/dL (06-18 @ 07:50)  Hemoglobin: 8.1 g/dL (06-17 @ 09:25)  Hemoglobin: 9.6 g/dL (06-16 @ 06:10)    Creatinine, Serum 0.97 (06-18 @ 07:50)  Creatinine, Serum 1.20 (06-17 @ 09:25)  Creatinine, Serum 0.99 (06-16 @ 06:10)        LIVER FUNCTIONS - ( 18 Jun 2025 07:50 )  Alb: 3.0 g/dL / Pro: 6.8 g/dL / ALK PHOS: 46 U/L / ALT: 23 U/L / AST: <3 U/L / GGT: x           Urinalysis Basic - ( 18 Jun 2025 07:50 )    Color: x / Appearance: x / SG: x / pH: x  Gluc: 129 mg/dL / Ketone: x  / Bili: x / Urobili: x   Blood: x / Protein: x / Nitrite: x   Leuk Esterase: x / RBC: x / WBC x   Sq Epi: x / Non Sq Epi: x / Bacteria: x

## 2025-06-18 NOTE — PROGRESS NOTE ADULT - ASSESSMENT
84 yr old female with PMHx afib on eliquis, COPD (not on home O2), PE/Rt lower extremity DVT 2017, Rt LE IVC filter 2017 CKD3, aplastic anemia, polymyalgia rheumatica on prednisone 5 mg po qd, requiring PRBC transfusions ~8 weeks (via Rt subclavian mediport), AVN bilat hips, HTN, HLD, HFpEF (75% 6.2.25), diastolic dysfx grade1, recent hospitalization 5/25/25-6/5/25 for AHDF/COPD exacerbation, pt also with hx of IVC filter, had Rt subclavian mediport, who was admitted on 6/8 with c/o Rt sided chest pain. general malaise. Pt stated began to feel ill evening before presentation, daughter this a.m. endorsed pt lethargic, pale. Patient was initially admitted in the ICU for hypotension requiring pressors. She is transferred out of the ICU. She still reports some R sided chest pain and SOB. Reports cough which started when she arrived in the hospital. Has some blood tinged sputum.    ID consulted for pneumonia. CT chest showed new multifocal infection predominantly involving the right upper lobe. Suspect respiratory symptoms multifactorial in the setting of CHF, anemia. Blood cultures remain no growth. COVID/FLU/RSV negative. MRSA nasal PCR, urine strep and legionella antigen negative. Respiratory culture grew commensal marilee consistent with body site. Serum Fungitell and galactomannan low. Quantiferon indeterminate, but likely due to underlying immunosuppression.    S/p bronchoscopy on 6/16. Had oozing blood from RUL but no endobronchial lesions noted. BAL AFB gram stain negative, and BAL culture growing commensal marilee consistent with body site. Completed 10 days of Zosyn.    #R sided pneumonia  #Hemoptysis  #Acute hypoxic respiratory failure  #Leukocytosis    -observe off antibiotics  -follow cultures to completion    Kimberly Velazco MD  Division of Infectious Diseases   Cell [phone] between 8am and 6pm   After 6pm and weekends please call ID service at [phone].     25 minutes spent on total encounter assessing patient, examination, chart review, counseling and coordinating care by the attending physician/nurse/care manager.

## 2025-06-18 NOTE — PROGRESS NOTE ADULT - SUBJECTIVE AND OBJECTIVE BOX
[facility]  INFECTIOUS DISEASES - [address], [place] [address]  Tel: [phone]     Fax: [phone]  =======================================================    MARTINEZ JONES [id number]    Follow up: No fevers. Breathing so-so. Denies any pain.    Allergies:  No Known Allergies      Antibiotics:  acetaminophen     Tablet .. 650 milliGRAM(s) Oral every 6 hours PRN  albuterol/ipratropium for Nebulization 3 milliLiter(s) Nebulizer every 6 hours  aMIOdarone    Tablet 100 milliGRAM(s) Oral daily  atorvastatin 10 milliGRAM(s) Oral at bedtime  chlorhexidine 2% Cloths 1 Application(s) Topical <User Schedule>  dextrose 50% Injectable 25 Gram(s) IV Push once  dextrose 50% Injectable 25 Gram(s) IV Push once  dextrose 50% Injectable 12.5 Gram(s) IV Push once  dextrose Oral Gel 15 Gram(s) Oral once PRN  fluticasone propionate/ salmeterol 500-50 MICROgram(s) Diskus 1 Dose(s) Inhalation two times a day  gabapentin 400 milliGRAM(s) Oral every 12 hours  insulin lispro (ADMELOG) corrective regimen sliding scale   SubCutaneous three times a day before meals  insulin lispro (ADMELOG) corrective regimen sliding scale   SubCutaneous at bedtime  pantoprazole  Injectable 40 milliGRAM(s) IV Push daily  predniSONE   Tablet 5 milliGRAM(s) Oral daily  torsemide 20 milliGRAM(s) Oral daily       REVIEW OF SYSTEMS:  CONSTITUTIONAL:  No Fever or chills  HEENT:  No sore throat or runny nose.  CARDIOVASCULAR: + R sided chest pain  RESPIRATORY:  + cough, shortness of breath  GASTROINTESTINAL:  No nausea, vomiting or diarrhea.  GENITOURINARY:  No dysuria  NEUROLOGIC:  No headache or dizziness     Physical Exam:  ICU Vital Signs Last 24 Hrs  T(C): 37.1 (18 Jun 2025 11:40), Max: 37.1 (18 Jun 2025 05:39)  T(F): 98.7 (18 Jun 2025 11:40), Max: 98.7 (18 Jun 2025 05:39)  HR: 87 (18 Jun 2025 13:55) (78 - 91)  BP: 101/58 (18 Jun 2025 11:40) (101/58 - 132/82)  BP(mean): --  ABP: --  ABP(mean): --  RR: 20 (18 Jun 2025 11:40) (18 - 20)  SpO2: 92% (18 Jun 2025 13:55) (91% - 100%)    O2 Parameters below as of 18 Jun 2025 13:55  Patient On (Oxygen Delivery Method): nasal cannula      GEN: NAD  HEENT: normocephalic and atraumatic.   NECK: Supple.   LUNGS: Normal respiratory effort  HEART: Regular rate and rhythm   ABDOMEN: Soft, nontender, and nondistended.    EXTREMITIES: B/L Lower leg pitting edema.  NEUROLOGIC: Answering questions appropriately  PSYCHIATRIC: Appropriate affect .      Labs:  06-18    144  |  104  |  29[H]  ----------------------------<  129[H]  3.8   |  34[H]  |  0.97    Ca    9.0      18 Jun 2025 07:50  Phos  2.3     06-18  Mg     1.3     06-18    TPro  6.8  /  Alb  3.0[L]  /  TBili  1.0  /  DBili  x   /  AST  <3[L]  /  ALT  23  /  AlkPhos  46  06-18                          8.5    11.98 )-----------( 323      ( 18 Jun 2025 07:50 )             26.2     PT/INR - ( 18 Jun 2025 07:50 )   PT: 15.9 sec;   INR: 1.36 ratio           Urinalysis Basic - ( 18 Jun 2025 07:50 )    Color: x / Appearance: x / SG: x / pH: x  Gluc: 129 mg/dL / Ketone: x  / Bili: x / Urobili: x   Blood: x / Protein: x / Nitrite: x   Leuk Esterase: x / RBC: x / WBC x   Sq Epi: x / Non Sq Epi: x / Bacteria: x      LIVER FUNCTIONS - ( 18 Jun 2025 07:50 )  Alb: 3.0 g/dL / Pro: 6.8 g/dL / ALK PHOS: 46 U/L / ALT: 23 U/L / AST: <3 U/L / GGT: x             RECENT CULTURES:  06-16 @ 13:09 Bronch Wash Bronchial Wash     Commensal marilee consistent with body site    Rare Squamous epithelial cells seen per low power field  No polymorphonuclear leukocytes seen per low power field  No organisms seen per oil power field      06-16 @ 11:55 Bronch Wash Bronchial Wash     No growth        06-16 @ 11:53 Bronch Wash Bronchial Wash     Commensal marilee consistent with body site    Rare polymorphonuclear leukocytes per oil power field  No Squamous epithelial cells per oil power field  No organisms seen per oil power field      06-08 @ 18:25 Sputum Sputum     Commensal marilee consistent with body site    Few Squamous epithelial cells per low power field  Few polymorphonuclear leukocytes per low power field  Few Gram Positive Cocci in Pairs and Chains per oil power field      06-08 @ 15:44 Clean Catch     <10,000 CFU/mL Normal Urogenital Marilee        06-08 @ 11:20 Blood Blood-Peripheral     No growth at 5 days        06-08 @ 11:15 Blood Blood-Peripheral     No growth at 5 days              All imaging and data are reviewed.     Assessment and Plan:       Kimberly Velazco MD  Division of infectious Diseases  Cell [phone] between 8am and 6pm  After 6pm and over the weekends please call ID service line at [phone].     55 minutes spent on total encounter assessing patient, examination, chart review, counseling and coordinating care by the attending physician/nurse/care manager.

## 2025-06-18 NOTE — PROGRESS NOTE ADULT - PROBLEM SELECTOR PLAN 7
Not on home medications  -advance diet as tolerated   -c/w LDISS   -monitor BG closely while on steroids   -Continue home gabapentin. Not on home medications  -advance diet as tolerated   -c/w LDISS   -Continue home gabapentin.

## 2025-06-18 NOTE — PROGRESS NOTE ADULT - SUBJECTIVE AND OBJECTIVE BOX
CHIEF COMPLAINT/ REASON FOR VISIT  .. Patient was seen to address the  issue listed under PROBLEM LIST which is located toward bottom of this note     [facility] PATEL TELE 306 W1    Allergies    No Known Allergies    Intolerances        PAST MEDICAL & SURGICAL HISTORY:  COPD (chronic obstructive pulmonary disease)      DM (diabetes mellitus)  diet-controlled      PAF (paroxysmal atrial fibrillation)  s/p ablation      Hiatal hernia      H/O aplastic anemia      History of IBS      H/O osteoporosis      HLD (hyperlipidemia)      PMR (polymyalgia rheumatica)      History of basal cell cancer      Chronic kidney disease (CKD)      Hypotension      Presence of IVC filter      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      History of immunodeficiency      Lumbar compression fracture      H/O hiatal hernia      H/O pulmonary fibrosis      H/O sinus bradycardia      History of fracture of right hip  "unoperable"      S/P hysterectomy      S/P foot surgery      S/P knee surgery      After cataract  bilateral eye cataract surgically removed      Closed right hip fracture  rigth hip ORIF july 19 2016      S/P IVC filter  nov 2016      S/P carpal tunnel release  Right 2008 & 6/27/17      H/O kyphoplasty      Port-A-Cath in place  right chest          FAMILY HISTORY:  Family history of bladder cancer (Mother)    Family history of myocardial infarction (Father)    FH: atrial fibrillation (Father)        Home Medications:  amiodarone 200 mg oral tablet: 0.5 tab(s) orally once a day (08 Jun 2025 16:43)  Bentyl 10 mg oral capsule: 1 cap(s) orally 2 times a day, As Needed (08 Jun 2025 16:43)  Eliquis 2.5 mg oral tablet: 1 tab(s) orally 2 times a day (08 Jun 2025 16:43)  esomeprazole 20 mg oral delayed release capsule: 1 cap(s) orally once a day (08 Jun 2025 16:43)  fluticasone-salmeterol 500 mcg-50 mcg/inh inhalation powder: 1 puff(s) inhaled 2 times a day (08 Jun 2025 16:43)  folic acid 1 mg oral tablet: 1 tab(s) orally once a day (in the morning) (08 Jun 2025 16:43)  gabapentin 400 mg oral capsule: 1 cap(s) orally 2 times a day (08 Jun 2025 16:43)  ipratropium-albuterol 0.5 mg-2.5 mg/3 mL inhalation solution: 3 milliliter(s) inhaled every 6 hours As needed Shortness of Breath and/or Wheezing (08 Jun 2025 16:43)  IVIG monthly:  (08 Jun 2025 16:43)  leflunomide 10 mg oral tablet: 1 tab(s) orally once a day (08 Jun 2025 16:43)  midodrine 10 mg oral tablet: 1 tab(s) orally every 8 hours (08 Jun 2025 16:43)  montelukast 10 mg oral tablet: 1 tab(s) orally once a day (08 Jun 2025 16:43)  pantoprazole 40 mg oral delayed release tablet: 1 tab(s) orally once a day (before a meal) (08 Jun 2025 16:43)  predniSONE 5 mg oral tablet: 1 tab(s) orally once a day (08 Jun 2025 16:43)  Procrit 10,000 units/mL preservative-free injectable solution: injectable once a week WEDNESDAY (08 Jun 2025 16:43)  Reclast Infusion , IV x 1 yearly:  (08 Jun 2025 16:43)  simvastatin 10 mg oral tablet: 1 tab(s) orally once a day (at bedtime) (08 Jun 2025 16:43)  tiotropium 2.5 mcg/inh inhalation aerosol: 2 puff(s) inhaled once a day (08 Jun 2025 16:43)  tiZANidine: 2 tab(s) orally once a day (at bedtime) as needed for  muscle spasm (08 Jun 2025 16:43)  torsemide 20 mg oral tablet: 1 tab(s) orally once a day (in the morning) (08 Jun 2025 16:49)      MEDICATIONS  (STANDING):  albuterol/ipratropium for Nebulization 3 milliLiter(s) Nebulizer every 6 hours  aMIOdarone    Tablet 100 milliGRAM(s) Oral daily  chlorhexidine 2% Cloths 1 Application(s) Topical <User Schedule>  dextrose 50% Injectable 25 Gram(s) IV Push once  dextrose 50% Injectable 12.5 Gram(s) IV Push once  dextrose 50% Injectable 25 Gram(s) IV Push once  fluticasone propionate/ salmeterol 500-50 MICROgram(s) Diskus 1 Dose(s) Inhalation two times a day  gabapentin 400 milliGRAM(s) Oral every 12 hours  insulin lispro (ADMELOG) corrective regimen sliding scale   SubCutaneous three times a day before meals  insulin lispro (ADMELOG) corrective regimen sliding scale   SubCutaneous at bedtime  pantoprazole  Injectable 40 milliGRAM(s) IV Push daily  torsemide 20 milliGRAM(s) Oral daily    MEDICATIONS  (PRN):  acetaminophen     Tablet .. 650 milliGRAM(s) Oral every 6 hours PRN Mild Pain (1 - 3)  dextrose Oral Gel 15 Gram(s) Oral once PRN Blood Glucose LESS THAN 70 milliGRAM(s)/deciliter      Diet, Regular:   DASH/TLC Sodium & Cholesterol Restricted (06-16-25 @ 21:27) [Active]          Vital Signs Last 24 Hrs  T(C): 37.1 (18 Jun 2025 05:39), Max: 37.1 (18 Jun 2025 05:39)  T(F): 98.7 (18 Jun 2025 05:39), Max: 98.7 (18 Jun 2025 05:39)  HR: 89 (18 Jun 2025 05:39) (72 - 91)  BP: 132/82 (18 Jun 2025 05:39) (124/77 - 132/82)  BP(mean): --  RR: 18 (18 Jun 2025 05:39) (18 - 20)  SpO2: 96% (18 Jun 2025 05:39) (91% - 96%)    Parameters below as of 18 Jun 2025 05:39  Patient On (Oxygen Delivery Method): nasal cannula  O2 Flow (L/min): 4        06-17-25 @ 07:01  -  06-18-25 @ 07:00  --------------------------------------------------------  IN: 520 mL / OUT: 400 mL / NET: 120 mL    06-18-25 @ 07:01  -  06-18-25 @ 08:27  --------------------------------------------------------  IN: 0 mL / OUT: 700 mL / NET: -700 mL              LABS:                        8.1    9.69  )-----------( 330      ( 17 Jun 2025 09:25 )             25.4     06-17    142  |  106  |  35[H]  ----------------------------<  142[H]  4.3   |  30  |  1.20    Ca    8.5      17 Jun 2025 09:25  Phos  2.7     06-17  Mg     1.7     06-17    TPro  6.4  /  Alb  2.8[L]  /  TBili  0.7  /  DBili  x   /  AST  5[L]  /  ALT  21  /  AlkPhos  47  06-17      Urinalysis Basic - ( 17 Jun 2025 09:25 )    Color: x / Appearance: x / SG: x / pH: x  Gluc: 142 mg/dL / Ketone: x  / Bili: x / Urobili: x   Blood: x / Protein: x / Nitrite: x   Leuk Esterase: x / RBC: x / WBC x   Sq Epi: x / Non Sq Epi: x / Bacteria: x            WBC:  WBC Count: 9.69 K/uL (06-17 @ 09:25)  WBC Count: 16.85 K/uL (06-16 @ 06:10)  WBC Count: 14.51 K/uL (06-15 @ 06:50)      MICROBIOLOGY:  RECENT CULTURES:  06-16 Bronch Wash Bronchial Wash XXXX   Rare Squamous epithelial cells seen per low power field  No polymorphonuclear leukocytes seen per low power field  No organisms seen per oil power field   Commensal marilee consistent with body site    06-16 Bronch Wash Bronchial Wash XXXX XXXX   No growth    06-16 Bronch Wash Bronchial Wash XXXX   Rare polymorphonuclear leukocytes per oil power field  No Squamous epithelial cells per oil power field  No organisms seen per oil power field   Commensal marilee consistent with body site                    Sodium:  Sodium: 142 mmol/L (06-17 @ 09:25)  Sodium: 143 mmol/L (06-16 @ 06:10)  Sodium: 141 mmol/L (06-15 @ 06:50)      1.20 mg/dL 06-17 @ 09:25  0.99 mg/dL 06-16 @ 06:10  0.96 mg/dL 06-15 @ 06:50      Hemoglobin:  Hemoglobin: 8.1 g/dL (06-17 @ 09:25)  Hemoglobin: 9.6 g/dL (06-16 @ 06:10)  Hemoglobin: 9.1 g/dL (06-15 @ 06:50)      Platelets: Platelet Count - Automated: 330 K/uL (06-17 @ 09:25)  Platelet Count - Automated: 306 K/uL (06-16 @ 06:10)  Platelet Count - Automated: 232 K/uL (06-15 @ 06:50)      LIVER FUNCTIONS - ( 17 Jun 2025 09:25 )  Alb: 2.8 g/dL / Pro: 6.4 g/dL / ALK PHOS: 47 U/L / ALT: 21 U/L / AST: 5 U/L / GGT: x             Urinalysis Basic - ( 17 Jun 2025 09:25 )    Color: x / Appearance: x / SG: x / pH: x  Gluc: 142 mg/dL / Ketone: x  / Bili: x / Urobili: x   Blood: x / Protein: x / Nitrite: x   Leuk Esterase: x / RBC: x / WBC x   Sq Epi: x / Non Sq Epi: x / Bacteria: x        RADIOLOGY & ADDITIONAL STUDIES:      MICROBIOLOGY:  RECENT CULTURES:  06-16 Bronch Wash Bronchial Wash XXXX   Rare Squamous epithelial cells seen per low power field  No polymorphonuclear leukocytes seen per low power field  No organisms seen per oil power field   Commensal marilee consistent with body site    06-16 Bronch Wash Bronchial Wash XXXX XXXX   No growth    06-16 Bronch Wash Bronchial Wash XXXX   Rare polymorphonuclear leukocytes per oil power field  No Squamous epithelial cells per oil power field  No organisms seen per oil power field   Commensal marilee consistent with body site

## 2025-06-18 NOTE — PROGRESS NOTE ADULT - ASSESSMENT
Prerenal azotemia, CKD 3  Dyspnea: Pneumonia, h/o COPD/CHF  s/p Shock   Diabetes  Aplastic anemia, requiring frequent blood transfusions  Hemoptysis    Stable renal indices. s/p bronchoscopy. To continue current meds. Monitor blood sugar levels. Insulin coverage as needed. Trend BP and titrate BP meds as needed.   Monitor h/h trend. Transfuse PRBC's PRN. Avoid nephrotoxic meds as possible. Pulmonary follow up. Will follow electrolytes and renal function trend. D/c planning.

## 2025-06-18 NOTE — PROGRESS NOTE ADULT - SUBJECTIVE AND OBJECTIVE BOX
[facility] -- Sai Valle, Girish Jackson Savella, , Rene Hernandez  Office # [phone]    Follow Up:      Subjective/Observations:     REVIEW OF SYSTEMS: All other review of systems is negative unless indicated above  PAST MEDICAL & SURGICAL HISTORY:  COPD (chronic obstructive pulmonary disease)      DM (diabetes mellitus)  diet-controlled      PAF (paroxysmal atrial fibrillation)  s/p ablation      Hiatal hernia      H/O aplastic anemia      History of IBS      H/O osteoporosis      HLD (hyperlipidemia)      PMR (polymyalgia rheumatica)      History of basal cell cancer      Chronic kidney disease (CKD)      Hypotension      Presence of IVC filter      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      History of immunodeficiency      Lumbar compression fracture      H/O hiatal hernia      H/O pulmonary fibrosis      H/O sinus bradycardia      History of fracture of right hip  "unoperable"      S/P hysterectomy      S/P foot surgery      S/P knee surgery      After cataract  bilateral eye cataract surgically removed      Closed right hip fracture  rigth hip ORIF july 19 2016      S/P IVC filter  nov 2016      S/P carpal tunnel release  Right 2008 & 6/27/17      H/O kyphoplasty      Port-A-Cath in place  right chest        MEDICATIONS  (STANDING):  albuterol/ipratropium for Nebulization 3 milliLiter(s) Nebulizer every 6 hours  aMIOdarone    Tablet 100 milliGRAM(s) Oral daily  chlorhexidine 2% Cloths 1 Application(s) Topical <User Schedule>  dextrose 50% Injectable 25 Gram(s) IV Push once  dextrose 50% Injectable 12.5 Gram(s) IV Push once  dextrose 50% Injectable 25 Gram(s) IV Push once  fluticasone propionate/ salmeterol 500-50 MICROgram(s) Diskus 1 Dose(s) Inhalation two times a day  gabapentin 400 milliGRAM(s) Oral every 12 hours  insulin lispro (ADMELOG) corrective regimen sliding scale   SubCutaneous three times a day before meals  insulin lispro (ADMELOG) corrective regimen sliding scale   SubCutaneous at bedtime  pantoprazole  Injectable 40 milliGRAM(s) IV Push daily  torsemide 20 milliGRAM(s) Oral daily    MEDICATIONS  (PRN):  acetaminophen     Tablet .. 650 milliGRAM(s) Oral every 6 hours PRN Mild Pain (1 - 3)  dextrose Oral Gel 15 Gram(s) Oral once PRN Blood Glucose LESS THAN 70 milliGRAM(s)/deciliter    Allergies    No Known Allergies    Intolerances      Vital Signs Last 24 Hrs  T(C): 37.1 (18 Jun 2025 05:39), Max: 37.1 (18 Jun 2025 05:39)  T(F): 98.7 (18 Jun 2025 05:39), Max: 98.7 (18 Jun 2025 05:39)  HR: 89 (18 Jun 2025 05:39) (72 - 91)  BP: 132/82 (18 Jun 2025 05:39) (124/77 - 132/82)  BP(mean): --  RR: 18 (18 Jun 2025 05:39) (18 - 20)  SpO2: 96% (18 Jun 2025 05:39) (91% - 96%)    Parameters below as of 18 Jun 2025 05:39  Patient On (Oxygen Delivery Method): nasal cannula  O2 Flow (L/min): 4    I&O's Summary    17 Jun 2025 07:01  -  18 Jun 2025 07:00  --------------------------------------------------------  IN: 520 mL / OUT: 400 mL / NET: 120 mL    18 Jun 2025 07:01  -  18 Jun 2025 07:27  --------------------------------------------------------  IN: 0 mL / OUT: 700 mL / NET: -700 mL        PHYSICAL EXAM:  TELE:   Constitutional: NAD, awake and alert, well-developed  HEENT: Moist Mucous Membranes, Anicteric  Pulmonary: Non-labored, breath sounds are clear bilaterally, No wheezing, rales or rhonchi  Cardiovascular: Regular, S1 and S2, No murmurs, rubs, gallops or clicks  Gastrointestinal: Bowel Sounds present, soft, nontender.   Lymph: No peripheral edema. No lymphadenopathy.  Skin: No visible rashes or ulcers.  Psych:  Mood & affect appropriate  LABS: All Labs Reviewed:                        8.1    9.69  )-----------( 330      ( 17 Jun 2025 09:25 )             25.4                         9.6    16.85 )-----------( 306      ( 16 Jun 2025 06:10 )             28.6     17 Jun 2025 09:25    142    |  106    |  35     ----------------------------<  142    4.3     |  30     |  1.20   16 Jun 2025 06:10    143    |  106    |  30     ----------------------------<  106    4.0     |  30     |  0.99     Ca    8.5        17 Jun 2025 09:25  Ca    9.0        16 Jun 2025 06:10  Phos  2.7       17 Jun 2025 09:25  Phos  2.5       16 Jun 2025 06:10  Mg     1.7       17 Jun 2025 09:25  Mg     1.5       16 Jun 2025 06:10    TPro  6.4    /  Alb  2.8    /  TBili  0.7    /  DBili  x      /  AST  5      /  ALT  21     /  AlkPhos  47     17 Jun 2025 09:25          12 Lead ECG:   Ventricular Rate 80 BPM    Atrial Rate 80 BPM    P-R Interval 132 ms    QRS Duration 86 ms    Q-T Interval 398 ms    QTC Calculation(Bazett) 459 ms    P Axis 75 degrees    R Axis -35 degrees    T Axis 78 degrees    Diagnosis Line Sinus rhythm with premature supraventricular complexes  Left axis deviation  Possible Lateral infarct , age undetermined  Abnormal ECG  When compared with ECG of 03-JUN-2025 22:27,  premature ventricular complexes are no longer present  Confirmed by ROSALVA GOODRICH ([id number]) on 6/8/2025 9:50:39 PM (06-08-25 @ 10:58)      TRANSTHORACIC ECHOCARDIOGRAM REPORT  ________________________________________________________________________________                                      _______       Pt. Name:       MARTINEZ JONES Study Date:    6/12/2025  MRN:            [id number]        YOB: 1940  Accession #:    [id number]       Age:           84 years  Account#:       [id number]      Gender:        F  Heart Rate:                     Height:        62.99 in (160.00 cm)  Rhythm:                         Weight:        147.71 lb (67.00 kg)  Blood Pressure: 109/63 mmHg     BSA/BMI:       1.70 m² / 26.17 kg/m²  ________________________________________________________________________________________  Referring Physician:    [id number] Naresh Vitale  Interpreting Physician: Daniel Jorge M.D.  Primary Sonographer:    Jamal Hayes    CPT:               ECHO TTE WO CON COMP W DOPP - 22399.m  Indication(s):     Shock, unspecified - R57.9  Procedure:         Transthoracic echocardiogram with 2-D, M-mode and complete                     spectral and color flow Doppler.  Ordering Location: ICU1  Admission Status:  Inpatient  Study Information: Image quality for this study is technically difficult.    _______________________________________________________________________________________     CONCLUSIONS:      1. Technically difficult image quality.   2. Left ventricular systolic function is normal with an ejection fraction of 67 % by Arias's method of disks.   3. Mild left ventricular hypertrophy.   4.Normal right ventricular cavity size and probably normal right ventricular systolic function.   5. Tricuspid aortic valve with normal leaflet excursion. There is calcification of the aortic valve leaflets.   6. Moderate mitral valve leaflet calcification.   7. Mild mitral regurgitation.   8. Moderate tricuspid regurgitation.   9. The inferior vena cava is normal in size measuring 1.64 cm in diameter, (normal <2.1cm) with normal inspiratory collapse (normal >50%) consistent with normal right atrial pressure (~3, range 0-5mmHg).  10. Estimated pulmonary artery systolic pressure is 57 mmHg, consistent with moderate-to-severe pulmonary hypertension.  11. Trace pericardial effusion.  12. Right pleural effusion noted.  13. Compared to the transthoracic echocardiogram performed on 6/2/2025, there have been no significant interval changes.    ________________________________________________________________________________________  FINDINGS:     Left Ventricle:  Left ventricular systolic function is normal with a calculated ejection fraction of 67 % by the Arias's biplane method of disks. Mild left ventricular hypertrophy.     Right Ventricle:  The right ventricular cavity is normal in size and right ventricular systolic function is probablynormal. Tricuspid annular plane systolic excursion (TAPSE) is 1.9 cm (normal >=1.7 cm).     Left Atrium:  The left atrium is normal in size with an indexed volume of 29.06 ml/m².     Right Atrium:  The right atrium is normal in size with an indexed volume of 26.00 ml/m² and an indexed area of 9.41 cm²/m².     Interatrial Septum:  The interatrial septum appears intact.     Aortic Valve:  The aortic valve is tricuspid with normal leaflet excursion. There is calcification of the aortic valve leaflets. There is mild thickening of the aortic valve leaflets. There is trace aortic regurgitation.     Mitral Valve:  Structurally normal mitral valve with normal leaflet excursion. There is calcification of the mitral valve annulus. There is moderate leaflet calcification. There is mild mitral regurgitation.     Tricuspid Valve:  The tricuspid valve is structurally normal with normal leaflet excursion. There is moderate tricuspid regurgitation. Estimated pulmonary artery systolic pressure is 57 mmHg,consistent with moderate-to-severe pulmonary hypertension.     Pulmonic Valve:  The pulmonic valve was not well visualized. There is trace pulmonic regurgitation.     Aorta:  The aortic root at the sinuses of Valsalva is normal in size, measuring 3.30 cm (indexed 1.94 cm/m²). The ascending aorta is normal in size, measuring 3.00 cm (indexed 1.76 cm/m²). The aortic arch diameter is normal in size, measuring 2.4 cm (indexed 1.41 cm/m²).     Pericardium:  There is a trace pericardial effusion.     Pleura:  Right pleural effusion noted.     Systemic Veins:  The inferior vena cava is normal in size measuring 1.64 cm in diameter, (normal <2.1cm) with normal inspiratory collapse (normal >50%) consistent with normal right atrial pressure (~3, range 0-5mmHg).  ____________________________________________________________________  QUANTITATIVE DATA:  Left Ventricle Measurements: (Indexed to BSA)     IVSd (2D):   1.2 cm  LVPWd (2D):  1.1 cm  LVIDd (2D):  4.2 cm  LVIDs (2D):  2.4 cm  LV Mass:     169 g  99.5 g/m²  LV Vol d, MOD A2C: 38.9 ml 22.88 ml/m²  LV Vol d, MOD A4C: 40.9 ml 24.06 ml/m²  LV Vol d, MOD BP:  40.9 ml 24.04 ml/m²  LV Vol s, MOD A2C: 11.3 ml 6.65 ml/m²  LV Vol s, MOD A4C: 13.4 ml 7.88 ml/m²  LV Vol s, MOD BP:  13.4 ml 7.91 ml/m²  LVOT SV MOD BP:    27.4 ml  LV EF% MOD BP:     67 %     MV E Vmax:    1.54 m/s  MV A Vmax:    0.74 m/s  MV E/A:       2.08  e' lateral:   11.90 cm/s  e' medial:    8.81 cm/s  E/e' lateral: 12.94  E/e' medial:  17.48  E/e' Average: 14.87  MV DT:272 msec    Aorta Measurements: (Normal range) (Indexed to BSA)     Ao Root d     3.30 cm (2.7 - 3.3 cm) 1.94 cm/m²  Ao Asc d, 2D: 3.00  Ao Asc prox:  3.00 cm                1.76 cm/m²  Ao Arch:      2.4 cm            Left Atrium Measurements: (Indexed to BSA)  LA Diam 2D: 3.60 cm         Right Ventricle Measurements: Right Atrial Measurements:     TAPSE:            1.9 cm      RA Vol s, MOD A4C         44.2 ml  RV Base (RVID1):  2.9 cm      RA Vol s, MOD A4C i BSA   26.00 ml/m²  RV Mid (RVID2): 2.5 cm      RA Area s, MOD A4C        16.0 cm²  RV Major (RVID3): 6.4 cm      RA Area s, MOD A4C, i BSA 9.41 cm²/m²       LVOT / RVOT/ Qp/Qs Data: (Indexed to BSA)  LVOT Diameter,s: 2.20 cm  LVOT Area:       3.80 cm²    Mitral Valve Measurements:     MV E Vmax: 1.5 m/s         MR Vmax:          3.80 m/s  MV A Vmax: 0.7 m/s         MR Peak Gradient: 57.8 mmHg  MV E/A:    2.1       Tricuspid Valve Measurements:     TR Vmax:          3.7 m/s  TR Peak Gradient: 54.5 mmHg  RA Pressure:      3 mmHg  PASP:             57 mmHg    ________________________________________________________________________________________  Electronically signed on 6/13/2025 at 11:19:51 AM by Daniel Jorge M.D.         *** Final ***      [facility] -- Sai Valle, Manuel, El Stout, , Rene Hernandez  Office # [phone]    Follow Up:  Hypotension, PAfib    Subjective/Observations: Seen sitting on the chair.  c/o SOB while on NC at 4L/min.  Still c/o hemoptysis, red in color.  Denies CP or palpitations    REVIEW OF SYSTEMS: All other review of systems is negative unless indicated above  PAST MEDICAL & SURGICAL HISTORY:  COPD (chronic obstructive pulmonary disease)  DM (diabetes mellitus)  diet-controlled  PAF (paroxysmal atrial fibrillation)  s/p ablation  Hiatal hernia  H/O aplastic anemia  History of IBS  H/O osteoporosis      HLD (hyperlipidemia)      PMR (polymyalgia rheumatica)      History of basal cell cancer      Chronic kidney disease (CKD)      Hypotension      Presence of IVC filter      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      History of immunodeficiency      Lumbar compression fracture      H/O hiatal hernia      H/O pulmonary fibrosis      H/O sinus bradycardia      History of fracture of right hip  "unoperable"      S/P hysterectomy      S/P foot surgery      S/P knee surgery      After cataract  bilateral eye cataract surgically removed      Closed right hip fracture  rigth hip ORIF july 19 2016      S/P IVC filter  nov 2016      S/P carpal tunnel release  Right 2008 & 6/27/17      H/O kyphoplasty      Port-A-Cath in place  right chest        MEDICATIONS  (STANDING):  albuterol/ipratropium for Nebulization 3 milliLiter(s) Nebulizer every 6 hours  aMIOdarone    Tablet 100 milliGRAM(s) Oral daily  chlorhexidine 2% Cloths 1 Application(s) Topical <User Schedule>  dextrose 50% Injectable 25 Gram(s) IV Push once  dextrose 50% Injectable 12.5 Gram(s) IV Push once  dextrose 50% Injectable 25 Gram(s) IV Push once  fluticasone propionate/ salmeterol 500-50 MICROgram(s) Diskus 1 Dose(s) Inhalation two times a day  gabapentin 400 milliGRAM(s) Oral every 12 hours  insulin lispro (ADMELOG) corrective regimen sliding scale   SubCutaneous three times a day before meals  insulin lispro (ADMELOG) corrective regimen sliding scale   SubCutaneous at bedtime  pantoprazole  Injectable 40 milliGRAM(s) IV Push daily  torsemide 20 milliGRAM(s) Oral daily    MEDICATIONS  (PRN):  acetaminophen     Tablet .. 650 milliGRAM(s) Oral every 6 hours PRN Mild Pain (1 - 3)  dextrose Oral Gel 15 Gram(s) Oral once PRN Blood Glucose LESS THAN 70 milliGRAM(s)/deciliter    Allergies    No Known Allergies    Intolerances      Vital Signs Last 24 Hrs  T(C): 37.1 (18 Jun 2025 05:39), Max: 37.1 (18 Jun 2025 05:39)  T(F): 98.7 (18 Jun 2025 05:39), Max: 98.7 (18 Jun 2025 05:39)  HR: 89 (18 Jun 2025 05:39) (72 - 91)  BP: 132/82 (18 Jun 2025 05:39) (124/77 - 132/82)  BP(mean): --  RR: 18 (18 Jun 2025 05:39) (18 - 20)  SpO2: 96% (18 Jun 2025 05:39) (91% - 96%)    Parameters below as of 18 Jun 2025 05:39  Patient On (Oxygen Delivery Method): nasal cannula  O2 Flow (L/min): 4    I&O's Summary    17 Jun 2025 07:01  -  18 Jun 2025 07:00  --------------------------------------------------------  IN: 520 mL / OUT: 400 mL / NET: 120 mL    18 Jun 2025 07:01  -  18 Jun 2025 07:27  --------------------------------------------------------  IN: 0 mL / OUT: 700 mL / NET: -700 mL    PHYSICAL EXAM:  TELE: Not on tele  Constitutional: NAD, awake and alert  HEENT: Moist Mucous Membranes, Anicteric  Pulmonary: Non-labored, breath sounds are diminished bilaterally, No wheezing, rales or rhonchi  Cardiovascular: IRRR, S1 and S2, +murmurs, no rubs, gallops or clicks  Gastrointestinal: Bowel Sounds present, soft, nontender.   Lymph: Trace peripheral edema. No lymphadenopathy.  Skin: No visible rashes or ulcers.  Psych:  Mood & affect appropriate      LABS: All Labs Reviewed:                        8.1    9.69  )-----------( 330      ( 17 Jun 2025 09:25 )             25.4                         9.6    16.85 )-----------( 306      ( 16 Jun 2025 06:10 )             28.6     17 Jun 2025 09:25    142    |  106    |  35     ----------------------------<  142    4.3     |  30     |  1.20   16 Jun 2025 06:10    143    |  106    |  30     ----------------------------<  106    4.0     |  30     |  0.99     Ca    8.5        17 Jun 2025 09:25  Ca    9.0        16 Jun 2025 06:10  Phos  2.7       17 Jun 2025 09:25  Phos  2.5       16 Jun 2025 06:10  Mg     1.7       17 Jun 2025 09:25  Mg     1.5       16 Jun 2025 06:10    TPro  6.4    /  Alb  2.8    /  TBili  0.7    /  DBili  x      /  AST  5      /  ALT  21     /  AlkPhos  47     17 Jun 2025 09:25    12 Lead ECG:   Ventricular Rate 80 BPM    Atrial Rate 80 BPM    P-R Interval 132 ms    QRS Duration 86 ms    Q-T Interval 398 ms    QTC Calculation(Bazett) 459 ms    P Axis 75 degrees    R Axis -35 degrees    T Axis 78 degrees    Diagnosis Line Sinus rhythm with premature supraventricular complexes  Left axis deviation  Possible Lateral infarct , age undetermined  Abnormal ECG  When compared with ECG of 03-JUN-2025 22:27,  premature ventricular complexes are no longer present  Confirmed by ROSALVA STOUT ([id number]) on 6/8/2025 9:50:39 PM (06-08-25 @ 10:58)      TRANSTHORACIC ECHOCARDIOGRAM REPORT  ________________________________________________________________________________                                      _______       Pt. Name:       MARTINEZ JONES Study Date:    6/12/2025  MRN:            [id number]        YOB: 1940  Accession #:    [id number]       Age:           84 years  Account#:       [id number]      Gender:        F  Heart Rate:                     Height:        62.99 in (160.00 cm)  Rhythm:                         Weight:        147.71 lb (67.00 kg)  Blood Pressure: 109/63 mmHg     BSA/BMI:       1.70 m² / 26.17 kg/m²  ________________________________________________________________________________________  Referring Physician:    [id number] Naresh Vitale  Interpreting Physician: Daniel Jorge M.D.  Primary Sonographer:    Jamal Hayes    CPT:               ECHO TTE WO CON COMP W DOPP - 10128.m  Indication(s):     Shock, unspecified - R57.9  Procedure:         Transthoracic echocardiogram with 2-D, M-mode and complete                     spectral and color flow Doppler.  Ordering Location: ICU1  Admission Status:  Inpatient  Study Information: Image quality for this study is technically difficult.    _______________________________________________________________________________________     CONCLUSIONS:      1. Technically difficult image quality.   2. Left ventricular systolic function is normal with an ejection fraction of 67 % by Arias's method of disks.   3. Mild left ventricular hypertrophy.   4.Normal right ventricular cavity size and probably normal right ventricular systolic function.   5. Tricuspid aortic valve with normal leaflet excursion. There is calcification of the aortic valve leaflets.   6. Moderate mitral valve leaflet calcification.   7. Mild mitral regurgitation.   8. Moderate tricuspid regurgitation.   9. The inferior vena cava is normal in size measuring 1.64 cm in diameter, (normal <2.1cm) with normal inspiratory collapse (normal >50%) consistent with normal right atrial pressure (~3, range 0-5mmHg).  10. Estimated pulmonary artery systolic pressure is 57 mmHg, consistent with moderate-to-severe pulmonary hypertension.  11. Trace pericardial effusion.  12. Right pleural effusion noted.  13. Compared to the transthoracic echocardiogram performed on 6/2/2025, there have been no significant interval changes.    ________________________________________________________________________________________  FINDINGS:     Left Ventricle:  Left ventricular systolic function is normal with a calculated ejection fraction of 67 % by the Arias's biplane method of disks. Mild left ventricular hypertrophy.     Right Ventricle:  The right ventricular cavity is normal in size and right ventricular systolic function is probablynormal. Tricuspid annular plane systolic excursion (TAPSE) is 1.9 cm (normal >=1.7 cm).     Left Atrium:  The left atrium is normal in size with an indexed volume of 29.06 ml/m².     Right Atrium:  The right atrium is normal in size with an indexed volume of 26.00 ml/m² and an indexed area of 9.41 cm²/m².     Interatrial Septum:  The interatrial septum appears intact.     Aortic Valve:  The aortic valve is tricuspid with normal leaflet excursion. There is calcification of the aortic valve leaflets. There is mild thickening of the aortic valve leaflets. There is trace aortic regurgitation.     Mitral Valve:  Structurally normal mitral valve with normal leaflet excursion. There is calcification of the mitral valve annulus. There is moderate leaflet calcification. There is mild mitral regurgitation.     Tricuspid Valve:  The tricuspid valve is structurally normal with normal leaflet excursion. There is moderate tricuspid regurgitation. Estimated pulmonary artery systolic pressure is 57 mmHg,consistent with moderate-to-severe pulmonary hypertension.     Pulmonic Valve:  The pulmonic valve was not well visualized. There is trace pulmonic regurgitation.     Aorta:  The aortic root at the sinuses of Valsalva is normal in size, measuring 3.30 cm (indexed 1.94 cm/m²). The ascending aorta is normal in size, measuring 3.00 cm (indexed 1.76 cm/m²). The aortic arch diameter is normal in size, measuring 2.4 cm (indexed 1.41 cm/m²).     Pericardium:  There is a trace pericardial effusion.     Pleura:  Right pleural effusion noted.     Systemic Veins:  The inferior vena cava is normal in size measuring 1.64 cm in diameter, (normal <2.1cm) with normal inspiratory collapse (normal >50%) consistent with normal right atrial pressure (~3, range 0-5mmHg).  ____________________________________________________________________  QUANTITATIVE DATA:  Left Ventricle Measurements: (Indexed to BSA)     IVSd (2D):   1.2 cm  LVPWd (2D):  1.1 cm  LVIDd (2D):  4.2 cm  LVIDs (2D):  2.4 cm  LV Mass:     169 g  99.5 g/m²  LV Vol d, MOD A2C: 38.9 ml 22.88 ml/m²  LV Vol d, MOD A4C: 40.9 ml 24.06 ml/m²  LV Vol d, MOD BP:  40.9 ml 24.04 ml/m²  LV Vol s, MOD A2C: 11.3 ml 6.65 ml/m²  LV Vol s, MOD A4C: 13.4 ml 7.88 ml/m²  LV Vol s, MOD BP:  13.4 ml 7.91 ml/m²  LVOT SV MOD BP:    27.4 ml  LV EF% MOD BP:     67 %     MV E Vmax:    1.54 m/s  MV A Vmax:    0.74 m/s  MV E/A:       2.08  e' lateral:   11.90 cm/s  e' medial:    8.81 cm/s  E/e' lateral: 12.94  E/e' medial:  17.48  E/e' Average: 14.87  MV DT:272 msec    Aorta Measurements: (Normal range) (Indexed to BSA)     Ao Root d     3.30 cm (2.7 - 3.3 cm) 1.94 cm/m²  Ao Asc d, 2D: 3.00  Ao Asc prox:  3.00 cm                1.76 cm/m²  Ao Arch:      2.4 cm            Left Atrium Measurements: (Indexed to BSA)  LA Diam 2D: 3.60 cm         Right Ventricle Measurements: Right Atrial Measurements:     TAPSE:            1.9 cm      RA Vol s, MOD A4C         44.2 ml  RV Base (RVID1):  2.9 cm      RA Vol s, MOD A4C i BSA   26.00 ml/m²  RV Mid (RVID2): 2.5 cm      RA Area s, MOD A4C        16.0 cm²  RV Major (RVID3): 6.4 cm      RA Area s, MOD A4C, i BSA 9.41 cm²/m²       LVOT / RVOT/ Qp/Qs Data: (Indexed to BSA)  LVOT Diameter,s: 2.20 cm  LVOT Area:       3.80 cm²    Mitral Valve Measurements:     MV E Vmax: 1.5 m/s         MR Vmax:          3.80 m/s  MV A Vmax: 0.7 m/s         MR Peak Gradient: 57.8 mmHg  MV E/A:    2.1       Tricuspid Valve Measurements:     TR Vmax:          3.7 m/s  TR Peak Gradient: 54.5 mmHg  RA Pressure:      3 mmHg  PASP:             57 mmHg    ________________________________________________________________________________________  Electronically signed on 6/13/2025 at 11:19:51 AM by Daniel Jorge M.D.         *** Final ***

## 2025-06-18 NOTE — PROGRESS NOTE ADULT - ASSESSMENT
84 female PMH of A-fib on Eliquis, COPD, CKD, aplastic anemia, polymyalgia rheumatica, on chronic steroids, avascular necrosis of hips, HLD, HTN, DM, recent admission to [facility] 5/26 through 6/5/2025 for CHF/fluid overload/COPD exacerbation, presents to the ED form [facility] for evaluation of fever and generalized feeling of being unwell, found to be septic and hypotensive.     Septic shock likely 2/2 to PNA and KIMBERLY.  - s/p ICU and pressor  - CT chest 6/10 with multifocal R sided PNA  - Pulm following  - s/p bronch, 6/16.  Showed blood oozing from RUL but no lesion  - On NC at 4L/min but c/o SOB, increased by Resident to 5L.  Downtitrate to keep Sat 92%    - Had atypical CP, 6/11.  Appears pleuritic  - Troponin negative   - Would resume home statin for Hx HLD  - EKG showed SR with PVC LAD, possible alteral infarct     - No significant sign fluid overload on exam but with trace edema  - Back on home Torsemide 20 mg daily.    - Echo 6/12/25 as above EF 67% mild LVH, mod MAC, mild MR, mod to severe pHTN    - Bp remains stable    - s/p IV resuscitation and IV pressor  - Now off Midodrine and Northera    - Hx of paroxysmal atrial fibrillation and DVT/PE  - Continue to hold home Eliquis/heparin gtt for persistent hemoptysis (from Afib standpoint)  - Resume Heparin gtt per Heme  - Continue Amiodarone 100 mg daily    - Monitor and replete electrolytes. Keep K>4.0 and Mg>2.0.  - Will continue to follow    Shaneka Rogers DNP, NP-C, AGACNP-C  Cardiology   Call TEAMS

## 2025-06-18 NOTE — PROGRESS NOTE ADULT - ASSESSMENT
REASON FOR VISIT  .. Management of problems listed below      EVENTS/CURRENT ISSUES.  . 6/16/2025 fob done oozing rul and l lo lobe post basal   . 6/14/2025 iv ufh dced   . 6/13/2025 continues to have mild hemoptysis but is also on iv ufh drip   . 6/13/2025 dw pt re rbaa of bronch and se agrees scheduled for 6/16 10 in or   . 6/13/2025 dw cardio and id   . 6/11/2025 qft funfitell and galactomannan orderd   . 6/11/2025 pt wa sstarted on iv ufh for hemoptysis and pleuritic chest pain but was stopped when vq showed vlp   . 6/10/2025 Mild hemoptysis   . 6/9 tr out of icu  . 6/8/2025  . PNEUMONIA RML 6/8/2025  . SHOCK 6/8/2025   . NOREPI 6/8/2025   . STRESS STEROIDS   .... HYDROCORTISONE 6/8/2025  . A fib (chronic) POA 6/8/2025  . ANEMIA Hb 6/8/2025 Hb 7.5  . KIMBERLY ON CKD Cr 6/8/2025 Cr 1.9        REVIEW OF SYMPTOMS   Able to give ROS  Yes     RELIABILITY +/-   CONSTITUTIONAL Weakness Yes    ENDOCRINE  No heat or cold intolerance    ALLERGY No hives  Sore throat No stridor  RESP Shortness of breath YES   NEURO New weakness No   CARDIAC   Palpitations No         PHYSICAL EXAM    HEENT Unremarkable  atraumatic   RESP Fair air entry  Harsh breath sound   CARDIAC S1 S2 No S3     NO JVD    ABDOMEN No hepatosplenomegaly   PEDAL EDEMA present No calf tenderness    REASON FOR VISIT  .. Management of problems listed below      CC.   . 6/8/2025 brought in by ems for right sided chest pain that started at 3am- nonradiating- patient was offered aspirin by ems- patient refused and stated to ems that she is on plavix and was advised not to take aspirin. patient on 43 litres nasl cannula-     OVERALL PRESENTATION.  . 6/8/2025 84 yr old female with PMHx afib on eliquis, COPD (not on home O2), PE/Rt lower extremity DVT 2017, Rt LE IVC filter 2017 CKD3, aplastic anemia, polymyalgia rheumatica on prednisone 5 mg po qd, requiring PRBC transfusions ~8 weeks (via Rt subclavian mediport), AVN bilat hips, HTN, HLD, HFpEF (75% 6.2.25), diastolic dysfx grade1, recent hospitalization 5/25/25-6/5/25 for ADHF/COPD exacerbation, Pt with eventual improvement and transfer to [facility] facility from where she presents to E.D. this a.m. 6/8/25 with c/o Rt sided chest pain. general malaise. Pt stated began to feel ill evening before presentation, daughter this a.m. endorsed pt lethargic, pale.     In E.D. Pt found to have fever with tmax of 101, KIMBERLY on CKD with sCr 1.90 (baseline 1.2-1.6), HYPOtnsive with SBP 80's (pt on midodrine therapy, usual 's). In addition found to have leukocytosis of 37.6 (baseline 5.25 6/5/25), procalcitonin of 13.6, Hgb 7.5, elevated INR 2.27,  Chest xray demonstrated RML consolidations, concerning for pneum. In E.D. pt receiving 500 cc's NS, Vanco 1 gm, zosyn. Pt received tylenol 1 gm IV x1.       BEST PRACTICE ISSUES.  . HOB ELEVATN.    .... Yes  . DIET  .   .... DASH 6/8/2025   . FREE WATER.   ....   .  IV FLUID.  .... 6/15/2025 1/2 ns 50   ..... 6/8 lr 40 x 12 h   . PHARMAC DVT PPLX .    .... 6/12-6/14/2025 iv ufh (hospitalist)  .... 6/11/2025 [facility] 5k.2   .... 6/10/2025 4:46 PM iv ufh   .... hospitals 6/9-6/10/2025   . NON PHARMAC DVT PPLX .      . STRESS ULCR PPLX .   ....   . DATE/DM MGMT.   ..... See under Endocrine section   GENERAL DATA .   . COVID.         .... scv2 6/8/2025 scv2 (-)   . GOC.    ....    . ICU STAY.    .... 6/8-6/9   . INFECTION PPLX .   .... CHLORHEXIDINE 2% 6/8/2025   . ALLGY.   ....  nka  . WT.   .... 6/8/2025 69   . BMI.  ....6/8/2025 26      [id number]  VITALS/GAS EXCHANGE/DRIPS    ABGS.     .  VS/ PO/IO/ VENT/ DRIPS.  6/18/2025 afeb 82 100/50   6/18/2025 4l 95%     XXXXXXXXXXXXXXXXXXXXXXXXXXXXXXXXXXX  PROBLEM ASSESSMENT RECOMMENDATIONS.  RESP.   . GAS EXCHANGE .   .... target PO 90-95%     . NEED FOR HOME OXYGEN   .... 6/18/2025 will need home oxygen if at discharge pulse ox at rest or on exertion is below 88%     . COPD   .... ADVAIR 500 6/8/2025   .... MONTELUKAST 10 6/8/2025   .... SPIRIVA 6/8/2025     . RESP FAILURE  .... 6/8/2025 Has ho hypercapnia   .... 6/8/2025 recommend monitor abg  .... 6/18/2025 requiring 4l nc     . MILD HEMOPTYSIS 6/10/2025   .... ct ch 6/10/2025 cw 5/31  ........ new mf infection in rul   ........ unchanged small r greater than left pl effsn   ....... enlarged pulm artery suggesting pulm hytn   .... 6/10/2025  dw hemat cardio id and instead of restarting apixa will start iv UFH which can be easily reversed in case Hb starts dropping but will be the rx for venous thromboembolism as well as for a fib   .... v duplx 6/10 (-)  .... vq 6/10 vlp   .... 6/13/2025 continues to have mild hemoptysis but is also on iv ufh drip   . 6/13/2025 dw pt re rbaa of bronch and se agrees scheduled for 6/16 10 in or   .... 6/13/2025 dw cardio and id   .... 6/16 fob done showed ooze rul and l lo lobe no eb lesions   .... 6/17/2025 continues to have mild hemoptysis   .... 6/17/2025 30% of hemoptysis cases no cause is found  Ordered gbm ab rf dsdna rf apla chapman   .... 6/18/2025 cbmab dsdna inderjit apla ab all (-)   .... 6/12 fungitell galactomannan (-)   .... strep legn ag 6/9 (-)   .... fob 6/16 afb sm (-) cyto (-)  .... 6/18/2025 hemoptysis likely sec to pneumonia in setting of pulm hypertension     . PLEURITIC CHEST PAIN RO VTE   .... v duplx 6/10 (-)  .... vq 6/10 vlp   .... 6/12/2025 iv ufh was stopped 6/11 as vq vlp but was restarted by hospitalist 6/12/2025 for af   .... chest pain resolvd       INFECTION.  . DATA  .... w 6/8-6/10-6/13-6/14-6/15-6/17-6/18/2025   .... w 13.6 - 9.2 - 15.9 - 18- 14 - 9.6 - 11.9  .... esr 6/8/2025 esr 17   .... w 6/8-6/9/2025 w 32 - 24   .... ua 6/8/2025 w 4   .... cxr 6/8/2025 cxr dense infiltra r upper hilum r mediport  .... ct ch 6/10 cw 5/31  ........ r greater than l effs   ........ partial rll atelectasis   ........ new patchy and nodular areas of consolidation rul and associated ground glass opacities   ........ ground glass and nodular opac also scott   ........ numerous tib rml and rll     . MICROBIO  .... sp 6/8 n commensals   .... flu ab 6/8/2025 (-)   .... rsv 6/8/2025 (-)    .... mrsa 6/9/2025 (-)  .... uc 6/8 (-)  .... bc 6/8 (-)     . PNEUMONIA RUL 6/8/2025   .... AZITHRO 6/8-6/11 /2025   .... ZOSYN 6/8-6/15/2025     CARDIAC.  . PAIN CHEST   .... 6/10/2025 co pain chest r   .... 6/10/2025 check ct to see if she has pleurisy   .... 6/10/2025  dw hemat cardio id and instead of restarting apixa will start iv UFH which can be easily reversed in case Hb starts dropping but will be the rx for venous thromboembolism as well as for a fib     . STRESS STEROIDS   .... HYDROCORTISONE   ........ 6/8/2025 h 50.4  ........ 6/10/2025 h 50.2   ........ 6/12/2025 hydrocort 50   ....... 6/14/2025 h 25  ........ 6/17/2025 dced     . SHOCK   .... MIDODRINE 6/8-6/16/2025 m 10.3   .... DROXIDOPA 6/9-6/16/2025 d 100.3   .... NOREPI 6/8-6/9/2025 n 8/250   .... target map 65 (+)     . CAD    .... Trop 6/8-6/9/2025 Tr 32- 24     . LACTICEMIA   .... la 6/8/2025 la 1.4     . CHF  .... pbnp 6/8/2025 pbnp 6599   .... tte 4/2025 mild ph  n vf  .... tte 6/2/2025   ........ ef 71%   ........ n rvsf    ........ pasp 54   ....... la mod dilatd   .... torsemide 20 6/17/2025  .... lasix 40 once 6/14/2025    . A fib (chronic) POA 6/8/2025  .... AMIODARONE 100 6/8/2025  .... 6/10/2025  dw hemat cardio id and instead of restarting apixa will start iv UFH which can be easily reversed in case Hb starts dropping but will be the rx for venous thromboembolism as well as for a fib   .... 6/12-6/14/2025 iv ufh     HEMAT.  . DATA  .... Plt 6/8/2025 plt 153   .... inr 6/8-6/9/2025 inr 2.27- 1.63      . ANEMIA   .... Hb 6/8-6/9-6/10-6/11-6/13-6/14-6/15-6/17-6/18/2025   .... Hb 7.5- 7.8 - 7.6- 7.1 - 8.4 - 7.3- 9.1 - 8.1 - 8.5    . TRANSFUSION  .... 6/8  1 u prbc    .... 6/14 2 u prbc     GI.   . DIET .   .... dash 6/8/2025     . LFT MONITORING   .... LFTS    6/8/2025  ........ AP     39  ........ AST   11  ........ ALT    35    RENAL.  . DATA  .... Na 6/8/2025 Na 137  .... K 6/8/2025 K 4   .... CO2 6/8/2025 co2 29   .... ag 6/8/2025 ag 9  .... alb 6/8/2025 alb 3.4     . KIMBERLY ON CKD   .... Cr 6/8-6/9-6/10-6/11-6/13-6/14/2025   .... Cr 1.9 - 1.4 - 1.3 - 1.3 - 1.1 - 1  .... Cr 5/27-5/28-5/29-5/30-6/1/2025   .... Cr 1.2 - 1.3 - 1.3 - 1.6 - 1.6    ENDO.  . DM  .  .... 6/8/2025 SL SCALE     NEURO.  . PAIN  .... GABAPENTIN 6/8/2025 g 400.2       [id number]   SUMMARY BASELINE .     84 yr old female pt of Dr Gallegos not on home ox or home cpap used to use trelegy lives with spouse quit 40 y 1/2 ppd with PMHx afib on eliquis, COPD (not on home O2), PE/Rt lower extremity DVT 2017, Rt LE IVC filter 2017 CKD3, aplastic anemia, polymyalgia rheumatica on prednisone 5 mg po qd, requiring PRBC transfusions around 8 weeks (via Rt subclavian mediport), AVN bilat hips, HTN, HLD, HFpEF (75% 6.2.25), diastolic dysfx grade1, recent hospitalization 5/25/25-6/5/25 for ADHF/COPD exacerbation, Pt with eventual improvement and transfer to [facility] facility   CC.   . 6/8/2025 R CHEST PAIN   MAIN ISSUES.  . COPD   . MILD HEMOPTYSOIS 6/10/2025   . PNEUMONIA RML 6/8/2025  .... ZOSYN 6/8/2025   . PLEURITIS CHEST PAIN 6/10/2025  .... 6/10 vte excluded vlp vq   . SHOCK 6/8/2025   .... resolvd tr oo icu 6/9   . NOREPI 6/8-6/9/2025   . STRESS STEROIDS   .... HYDROCORTISONE 6/8-6/17/2025  . A fib (chronic) POA 6/8/2025  .... 6/10-6/10/2025 IV UFH   .... 6/12- 6/14 IV UFH   . ANEMIA Hb 6/8-6/13/2025 Hb 7.5- 8.4  . TRANSFUSION  .... 6/8  1 u prbc    . KIMBERLY ON CKD Cr 6/8-6/13/2025 Cr 1.9- 1.1  PMH.   . AFon Eliquis,   . COPD (not on home o2),   . CKD,   . aplastic anemia,   . TRANSFUSION 6/2/2025 1 u prbc   . PMR (chronic prednisone with AVN hip),   . HLD,   . HTN,   . DM    PROCEDURES/DEVICES .  . 6/16/2025 FOB br wash rul ooze rul l lo lobe post basal     PAST PROCEDURES   . r mediport poa 6/8/2025     DISCUSSIONS.  .... Discussed with primary care and relevant consultants on an ongoing basis       TIME SPENT.  . Over 36 minutes aggregate care time spent on encounter; activities included   direct patient care, counseling and/or coordinating care reviewing notes, lab data/ imaging , discussion with multidisciplinary team/ patient  /family and explaining in detail risks, benefits, alternatives  of the recommendations

## 2025-06-18 NOTE — PROGRESS NOTE ADULT - SUBJECTIVE AND OBJECTIVE BOX
---incomplete----    Patient is a 84y old  Female who presents with a chief complaint of septic shock, PNA (18 Jun 2025 08:27)    INTERVAL HPI/OVERNIGHT EVENTS: No acute overnight events. Pt seen and examined at the bedside this AM.     MEDICATIONS  (STANDING):  albuterol/ipratropium for Nebulization 3 milliLiter(s) Nebulizer every 6 hours  aMIOdarone    Tablet 100 milliGRAM(s) Oral daily  chlorhexidine 2% Cloths 1 Application(s) Topical <User Schedule>  dextrose 50% Injectable 25 Gram(s) IV Push once  dextrose 50% Injectable 12.5 Gram(s) IV Push once  dextrose 50% Injectable 25 Gram(s) IV Push once  fluticasone propionate/ salmeterol 500-50 MICROgram(s) Diskus 1 Dose(s) Inhalation two times a day  gabapentin 400 milliGRAM(s) Oral every 12 hours  insulin lispro (ADMELOG) corrective regimen sliding scale   SubCutaneous three times a day before meals  insulin lispro (ADMELOG) corrective regimen sliding scale   SubCutaneous at bedtime  magnesium sulfate  IVPB 2 Gram(s) IV Intermittent every 6 hours  pantoprazole  Injectable 40 milliGRAM(s) IV Push daily  torsemide 20 milliGRAM(s) Oral daily    MEDICATIONS  (PRN):  acetaminophen     Tablet .. 650 milliGRAM(s) Oral every 6 hours PRN Mild Pain (1 - 3)  dextrose Oral Gel 15 Gram(s) Oral once PRN Blood Glucose LESS THAN 70 milliGRAM(s)/deciliter      Allergies    No Known Allergies    Intolerances        REVIEW OF SYSTEMS:  CONSTITUTIONAL: No fever or chills  HEENT:  No headache, no sore throat  RESPIRATORY: No cough, wheezing, or shortness of breath  CARDIOVASCULAR: No chest pain, palpitations  GASTROINTESTINAL: No abd pain, nausea, vomiting, or diarrhea  GENITOURINARY: No dysuria, frequency, or hematuria  NEUROLOGICAL: no focal weakness or dizziness  MUSCULOSKELETAL: no myalgias     Vital Signs Last 24 Hrs  T(C): 37.1 (18 Jun 2025 05:39), Max: 37.1 (18 Jun 2025 05:39)  T(F): 98.7 (18 Jun 2025 05:39), Max: 98.7 (18 Jun 2025 05:39)  HR: 89 (18 Jun 2025 05:39) (72 - 91)  BP: 132/82 (18 Jun 2025 05:39) (124/77 - 132/82)  BP(mean): --  RR: 18 (18 Jun 2025 05:39) (18 - 20)  SpO2: 96% (18 Jun 2025 05:39) (91% - 96%)    Parameters below as of 18 Jun 2025 05:39  Patient On (Oxygen Delivery Method): nasal cannula  O2 Flow (L/min): 4      PHYSICAL EXAM:  GENERAL: NAD  HEENT:  anicteric, moist mucous membranes  CHEST/LUNG:  CTA b/l, no rales, wheezes, or rhonchi  HEART:  RRR, S1, S2  ABDOMEN:  BS+, soft, nontender, nondistended  EXTREMITIES: no edema, cyanosis, or calf tenderness  NERVOUS SYSTEM: answers questions and follows commands appropriately    LABS:                        8.5    11.98 )-----------( 323      ( 18 Jun 2025 07:50 )             26.2     CBC Full  -  ( 18 Jun 2025 07:50 )  WBC Count : 11.98 K/uL  Hemoglobin : 8.5 g/dL  Hematocrit : 26.2 %  Platelet Count - Automated : 323 K/uL  Mean Cell Volume : 96.0 fl  Mean Cell Hemoglobin : 31.1 pg  Mean Cell Hemoglobin Concentration : 32.4 g/dL  Auto Neutrophil # : 9.73 K/uL  Auto Lymphocyte # : 0.26 K/uL  Auto Monocyte # : 1.72 K/uL  Auto Eosinophil # : 0.00 K/uL  Auto Basophil # : 0.01 K/uL  Auto Neutrophil % : 81.1 %  Auto Lymphocyte % : 2.2 %  Auto Monocyte % : 14.4 %  Auto Eosinophil % : 0.0 %  Auto Basophil % : 0.1 %    18 Jun 2025 07:50    144    |  104    |  29     ----------------------------<  129    3.8     |  34     |  0.97     Ca    9.0        18 Jun 2025 07:50  Phos  2.3       18 Jun 2025 07:50  Mg     1.3       18 Jun 2025 07:50    TPro  6.8    /  Alb  3.0    /  TBili  1.0    /  DBili  x      /  AST  <3     /  ALT  23     /  AlkPhos  46     18 Jun 2025 07:50    PT/INR - ( 18 Jun 2025 07:50 )   PT: 15.9 sec;   INR: 1.36 ratio           Urinalysis Basic - ( 18 Jun 2025 07:50 )    Color: x / Appearance: x / SG: x / pH: x  Gluc: 129 mg/dL / Ketone: x  / Bili: x / Urobili: x   Blood: x / Protein: x / Nitrite: x   Leuk Esterase: x / RBC: x / WBC x   Sq Epi: x / Non Sq Epi: x / Bacteria: x      CAPILLARY BLOOD GLUCOSE      POCT Blood Glucose.: 138 mg/dL (18 Jun 2025 07:57)  POCT Blood Glucose.: 106 mg/dL (17 Jun 2025 21:12)  POCT Blood Glucose.: 125 mg/dL (17 Jun 2025 16:53)  POCT Blood Glucose.: 141 mg/dL (17 Jun 2025 12:02)        Culture - Bronchial (collected 06-16-25 @ 13:09)  Source: Bronch Wash Bronchial Wash  Gram Stain (06-16-25 @ 21:58):    Rare Squamous epithelial cells seen per low power field    No polymorphonuclear leukocytes seen per low power field    No organisms seen per oil power field  Preliminary Report (06-17-25 @ 12:30):    Commensal marilee consistent with body site    Culture - Acid Fast - Bronchial w/Smear (collected 06-16-25 @ 11:55)  Source: Bronch Wash Bronchial Wash    Culture - Fungal, Bronchial (collected 06-16-25 @ 11:55)  Source: Bronchial Bronchial Wash  Preliminary Report (06-18-25 @ 05:35):    No growth    Culture - Legionella (collected 06-16-25 @ 11:55)  Source: Legionella  Preliminary Report (06-17-25 @ 23:01):    No Legionella species isolated to date    Culture - Bronchial (collected 06-16-25 @ 11:53)  Source: Bronch Wash Bronchial Wash  Gram Stain (06-17-25 @ 00:37):    Rare polymorphonuclear leukocytes per oil power field    No Squamous epithelial cells per oil power field    No organisms seen per oil power field  Final Report (06-18-25 @ 08:08):    Commensal marilee consistent with body site        RADIOLOGY & ADDITIONAL TESTS:  Personally reviewed.     Consultant(s) Notes Reviewed:  [x] YES  [ ] NO Patient is a 84y old  Female who presents with a chief complaint of septic shock, PNA (18 Jun 2025 08:27)    ---incomplete-----    INTERVAL HPI/OVERNIGHT EVENTS: No acute overnight events. Pt seen and examined at the bedside this AM. Patient admits she is having more labored breathing, increased O2 and she is scheduled to resume her home torsemide today. She wishes to know why she's having hemoptysis, but her sputum appears more mucous tinged rather than bloody. Patient completed 10 day course of zosyn, and completed high dose steroid taper, will resume her home steroid today.     MEDICATIONS  (STANDING):  albuterol/ipratropium for Nebulization 3 milliLiter(s) Nebulizer every 6 hours  aMIOdarone    Tablet 100 milliGRAM(s) Oral daily  chlorhexidine 2% Cloths 1 Application(s) Topical <User Schedule>  dextrose 50% Injectable 25 Gram(s) IV Push once  dextrose 50% Injectable 12.5 Gram(s) IV Push once  dextrose 50% Injectable 25 Gram(s) IV Push once  fluticasone propionate/ salmeterol 500-50 MICROgram(s) Diskus 1 Dose(s) Inhalation two times a day  gabapentin 400 milliGRAM(s) Oral every 12 hours  insulin lispro (ADMELOG) corrective regimen sliding scale   SubCutaneous three times a day before meals  insulin lispro (ADMELOG) corrective regimen sliding scale   SubCutaneous at bedtime  magnesium sulfate  IVPB 2 Gram(s) IV Intermittent every 6 hours  pantoprazole  Injectable 40 milliGRAM(s) IV Push daily  torsemide 20 milliGRAM(s) Oral daily    MEDICATIONS  (PRN):  acetaminophen     Tablet .. 650 milliGRAM(s) Oral every 6 hours PRN Mild Pain (1 - 3)  dextrose Oral Gel 15 Gram(s) Oral once PRN Blood Glucose LESS THAN 70 milliGRAM(s)/deciliter      Allergies    No Known Allergies    Intolerances        REVIEW OF SYSTEMS: + Slight dyspnea     Vital Signs Last 24 Hrs  T(C): 37.1 (18 Jun 2025 05:39), Max: 37.1 (18 Jun 2025 05:39)  T(F): 98.7 (18 Jun 2025 05:39), Max: 98.7 (18 Jun 2025 05:39)  HR: 89 (18 Jun 2025 05:39) (72 - 91)  BP: 132/82 (18 Jun 2025 05:39) (124/77 - 132/82)  BP(mean): --  RR: 18 (18 Jun 2025 05:39) (18 - 20)  SpO2: 96% (18 Jun 2025 05:39) (91% - 96%)    Parameters below as of 18 Jun 2025 05:39  Patient On (Oxygen Delivery Method): nasal cannula  O2 Flow (L/min): 4      PHYSICAL EXAM:  GENERAL: NAD  CHEST/LUNG:  rales notable on upper lung fields, diminished breath sounds in lower lung fields   HEART:  slightly irregular, no overt murmurs noted   ABDOMEN:  BS+, soft, nontender, nondistended  EXTREMITIES: +1 peripheral edema noted b/l LE  NERVOUS SYSTEM: answers questions and follows commands appropriately    LABS:                        8.5    11.98 )-----------( 323      ( 18 Jun 2025 07:50 )             26.2     CBC Full  -  ( 18 Jun 2025 07:50 )  WBC Count : 11.98 K/uL  Hemoglobin : 8.5 g/dL  Hematocrit : 26.2 %  Platelet Count - Automated : 323 K/uL  Mean Cell Volume : 96.0 fl  Mean Cell Hemoglobin : 31.1 pg  Mean Cell Hemoglobin Concentration : 32.4 g/dL  Auto Neutrophil # : 9.73 K/uL  Auto Lymphocyte # : 0.26 K/uL  Auto Monocyte # : 1.72 K/uL  Auto Eosinophil # : 0.00 K/uL  Auto Basophil # : 0.01 K/uL  Auto Neutrophil % : 81.1 %  Auto Lymphocyte % : 2.2 %  Auto Monocyte % : 14.4 %  Auto Eosinophil % : 0.0 %  Auto Basophil % : 0.1 %    18 Jun 2025 07:50    144    |  104    |  29     ----------------------------<  129    3.8     |  34     |  0.97     Ca    9.0        18 Jun 2025 07:50  Phos  2.3       18 Jun 2025 07:50  Mg     1.3       18 Jun 2025 07:50    TPro  6.8    /  Alb  3.0    /  TBili  1.0    /  DBili  x      /  AST  <3     /  ALT  23     /  AlkPhos  46     18 Jun 2025 07:50    PT/INR - ( 18 Jun 2025 07:50 )   PT: 15.9 sec;   INR: 1.36 ratio           Urinalysis Basic - ( 18 Jun 2025 07:50 )    Color: x / Appearance: x / SG: x / pH: x  Gluc: 129 mg/dL / Ketone: x  / Bili: x / Urobili: x   Blood: x / Protein: x / Nitrite: x   Leuk Esterase: x / RBC: x / WBC x   Sq Epi: x / Non Sq Epi: x / Bacteria: x      CAPILLARY BLOOD GLUCOSE      POCT Blood Glucose.: 138 mg/dL (18 Jun 2025 07:57)  POCT Blood Glucose.: 106 mg/dL (17 Jun 2025 21:12)  POCT Blood Glucose.: 125 mg/dL (17 Jun 2025 16:53)  POCT Blood Glucose.: 141 mg/dL (17 Jun 2025 12:02)        Culture - Bronchial (collected 06-16-25 @ 13:09)  Source: Bronch Wash Bronchial Wash  Gram Stain (06-16-25 @ 21:58):    Rare Squamous epithelial cells seen per low power field    No polymorphonuclear leukocytes seen per low power field    No organisms seen per oil power field  Preliminary Report (06-17-25 @ 12:30):    Commensal marilee consistent with body site    Culture - Acid Fast - Bronchial w/Smear (collected 06-16-25 @ 11:55)  Source: Bronch Wash Bronchial Wash    Culture - Fungal, Bronchial (collected 06-16-25 @ 11:55)  Source: Bronchial Bronchial Wash  Preliminary Report (06-18-25 @ 05:35):    No growth    Culture - Legionella (collected 06-16-25 @ 11:55)  Source: Legionella  Preliminary Report (06-17-25 @ 23:01):    No Legionella species isolated to date    Culture - Bronchial (collected 06-16-25 @ 11:53)  Source: Bronch Wash Bronchial Wash  Gram Stain (06-17-25 @ 00:37):    Rare polymorphonuclear leukocytes per oil power field    No Squamous epithelial cells per oil power field    No organisms seen per oil power field  Final Report (06-18-25 @ 08:08):    Commensal marilee consistent with body site        RADIOLOGY & ADDITIONAL TESTS:  Personally reviewed.     Consultant(s) Notes Reviewed:  [x] YES  [ ] NO Patient is a 84y old  Female who presents with a chief complaint of septic shock, PNA (18 Jun 2025 08:27)    INTERVAL HPI/OVERNIGHT EVENTS: No acute overnight events. Pt seen and examined at the bedside this AM. Patient admits she is having more labored breathing, increased O2 and she is scheduled to resume her home torsemide today. She wishes to know why she's having hemoptysis, but her sputum appears more mucous tinged rather than bloody. Patient completed 10 day course of zosyn, and completed high dose steroid taper, will resume her home steroid today.     MEDICATIONS  (STANDING):  albuterol/ipratropium for Nebulization 3 milliLiter(s) Nebulizer every 6 hours  aMIOdarone    Tablet 100 milliGRAM(s) Oral daily  chlorhexidine 2% Cloths 1 Application(s) Topical <User Schedule>  dextrose 50% Injectable 25 Gram(s) IV Push once  dextrose 50% Injectable 12.5 Gram(s) IV Push once  dextrose 50% Injectable 25 Gram(s) IV Push once  fluticasone propionate/ salmeterol 500-50 MICROgram(s) Diskus 1 Dose(s) Inhalation two times a day  gabapentin 400 milliGRAM(s) Oral every 12 hours  insulin lispro (ADMELOG) corrective regimen sliding scale   SubCutaneous three times a day before meals  insulin lispro (ADMELOG) corrective regimen sliding scale   SubCutaneous at bedtime  magnesium sulfate  IVPB 2 Gram(s) IV Intermittent every 6 hours  pantoprazole  Injectable 40 milliGRAM(s) IV Push daily  torsemide 20 milliGRAM(s) Oral daily    MEDICATIONS  (PRN):  acetaminophen     Tablet .. 650 milliGRAM(s) Oral every 6 hours PRN Mild Pain (1 - 3)  dextrose Oral Gel 15 Gram(s) Oral once PRN Blood Glucose LESS THAN 70 milliGRAM(s)/deciliter      Allergies    No Known Allergies    Intolerances        REVIEW OF SYSTEMS: + Slight dyspnea     Vital Signs Last 24 Hrs  T(C): 37.1 (18 Jun 2025 05:39), Max: 37.1 (18 Jun 2025 05:39)  T(F): 98.7 (18 Jun 2025 05:39), Max: 98.7 (18 Jun 2025 05:39)  HR: 89 (18 Jun 2025 05:39) (72 - 91)  BP: 132/82 (18 Jun 2025 05:39) (124/77 - 132/82)  BP(mean): --  RR: 18 (18 Jun 2025 05:39) (18 - 20)  SpO2: 96% (18 Jun 2025 05:39) (91% - 96%)    Parameters below as of 18 Jun 2025 05:39  Patient On (Oxygen Delivery Method): nasal cannula  O2 Flow (L/min): 4      PHYSICAL EXAM:  GENERAL: NAD  CHEST/LUNG:  rales notable on upper lung fields, diminished breath sounds in lower lung fields   HEART:  slightly irregular, no overt murmurs noted   ABDOMEN:  BS+, soft, nontender, nondistended  EXTREMITIES: +1 peripheral edema noted b/l LE  NERVOUS SYSTEM: answers questions and follows commands appropriately    LABS:                        8.5    11.98 )-----------( 323      ( 18 Jun 2025 07:50 )             26.2     CBC Full  -  ( 18 Jun 2025 07:50 )  WBC Count : 11.98 K/uL  Hemoglobin : 8.5 g/dL  Hematocrit : 26.2 %  Platelet Count - Automated : 323 K/uL  Mean Cell Volume : 96.0 fl  Mean Cell Hemoglobin : 31.1 pg  Mean Cell Hemoglobin Concentration : 32.4 g/dL  Auto Neutrophil # : 9.73 K/uL  Auto Lymphocyte # : 0.26 K/uL  Auto Monocyte # : 1.72 K/uL  Auto Eosinophil # : 0.00 K/uL  Auto Basophil # : 0.01 K/uL  Auto Neutrophil % : 81.1 %  Auto Lymphocyte % : 2.2 %  Auto Monocyte % : 14.4 %  Auto Eosinophil % : 0.0 %  Auto Basophil % : 0.1 %    18 Jun 2025 07:50    144    |  104    |  29     ----------------------------<  129    3.8     |  34     |  0.97     Ca    9.0        18 Jun 2025 07:50  Phos  2.3       18 Jun 2025 07:50  Mg     1.3       18 Jun 2025 07:50    TPro  6.8    /  Alb  3.0    /  TBili  1.0    /  DBili  x      /  AST  <3     /  ALT  23     /  AlkPhos  46     18 Jun 2025 07:50    PT/INR - ( 18 Jun 2025 07:50 )   PT: 15.9 sec;   INR: 1.36 ratio           Urinalysis Basic - ( 18 Jun 2025 07:50 )    Color: x / Appearance: x / SG: x / pH: x  Gluc: 129 mg/dL / Ketone: x  / Bili: x / Urobili: x   Blood: x / Protein: x / Nitrite: x   Leuk Esterase: x / RBC: x / WBC x   Sq Epi: x / Non Sq Epi: x / Bacteria: x      CAPILLARY BLOOD GLUCOSE      POCT Blood Glucose.: 138 mg/dL (18 Jun 2025 07:57)  POCT Blood Glucose.: 106 mg/dL (17 Jun 2025 21:12)  POCT Blood Glucose.: 125 mg/dL (17 Jun 2025 16:53)  POCT Blood Glucose.: 141 mg/dL (17 Jun 2025 12:02)        Culture - Bronchial (collected 06-16-25 @ 13:09)  Source: Bronch Wash Bronchial Wash  Gram Stain (06-16-25 @ 21:58):    Rare Squamous epithelial cells seen per low power field    No polymorphonuclear leukocytes seen per low power field    No organisms seen per oil power field  Preliminary Report (06-17-25 @ 12:30):    Commensal marilee consistent with body site    Culture - Acid Fast - Bronchial w/Smear (collected 06-16-25 @ 11:55)  Source: Bronch Wash Bronchial Wash    Culture - Fungal, Bronchial (collected 06-16-25 @ 11:55)  Source: Bronchial Bronchial Wash  Preliminary Report (06-18-25 @ 05:35):    No growth    Culture - Legionella (collected 06-16-25 @ 11:55)  Source: Legionella  Preliminary Report (06-17-25 @ 23:01):    No Legionella species isolated to date    Culture - Bronchial (collected 06-16-25 @ 11:53)  Source: Bronch Wash Bronchial Wash  Gram Stain (06-17-25 @ 00:37):    Rare polymorphonuclear leukocytes per oil power field    No Squamous epithelial cells per oil power field    No organisms seen per oil power field  Final Report (06-18-25 @ 08:08):    Commensal marilee consistent with body site        RADIOLOGY & ADDITIONAL TESTS:  Personally reviewed.     Consultant(s) Notes Reviewed:  [x] YES  [ ] NO

## 2025-06-18 NOTE — PROGRESS NOTE ADULT - PROBLEM SELECTOR PLAN 12
#Edema:  trace edema  -Will start home Torsemide 20 mg daily tomorrow after giving 40mg IV lasix x 1 this AM. Should have BMP done prior.     #VTE ppx: - Hep gtt discontinued 2/2 hemoptysis   #GI ppx: iv ppi  #Diet: Diet, Regular: DASH/TLC {Sodium & Cholesterol Restricted} (06-08-25 @ 15:56) [Active]  #Activity: Activity - Increase As Tolerated:   Time/Priority:  Routine (06-08-25 @ 14:16) [Active]  #ACP: full code  #Dispo: further plans pending clinical course #Edema:  trace edema  -restarted home Torsemide 20 mg daily    #VTE ppx: - Hep gtt discontinued 2/2 hemoptysis   #GI ppx: iv ppi  #Diet: Diet, Regular: DASH/TLC {Sodium & Cholesterol Restricted} (06-08-25 @ 15:56) [Active]  #Activity: Activity - Increase As Tolerated:   Time/Priority:  Routine (06-08-25 @ 14:16) [Active]  #ACP: full code  #Dispo: further plans pending clinical course

## 2025-06-18 NOTE — PROGRESS NOTE ADULT - PROBLEM SELECTOR PLAN 9
chronic rate controlled   monitor sputum production ( blood tinged today )   would benefit overall from therapeutic AC but given low h/h  c/w amiodarone 100mg qd.  - 6/12 heparin gtt started  - 6/13 : heparin gtt now discontinued 2/2 hemoptysis chronic rate controlled   monitor sputum production ( blood tinged today )   would benefit overall from therapeutic AC but given low h/h  c/w amiodarone 100mg qd.  - 6/12 heparin gtt started  - 6/13 : heparin gtt now discontinued 2/2 hemoptysis  - as hemoptysis is resolving and hgb increases beyond 9, will resume eliquis

## 2025-06-18 NOTE — PROGRESS NOTE ADULT - PROBLEM SELECTOR PLAN 11
on home simvastatin 10mg qd as atorvastatin 10mg  -can consider restarting but this is not urgent on home simvastatin 10mg qd as atorvastatin 10mg

## 2025-06-19 LAB
ALBUMIN SERPL ELPH-MCNC: 2.7 G/DL — LOW (ref 3.3–5)
ALP SERPL-CCNC: 38 U/L — LOW (ref 40–120)
ALT FLD-CCNC: 18 U/L — SIGNIFICANT CHANGE UP (ref 12–78)
ANA TITR SER: NEGATIVE — SIGNIFICANT CHANGE UP
ANION GAP SERPL CALC-SCNC: 6 MMOL/L — SIGNIFICANT CHANGE UP (ref 5–17)
AST SERPL-CCNC: 13 U/L — LOW (ref 15–37)
AUTO DIFF PNL BLD: NEGATIVE — SIGNIFICANT CHANGE UP
BILIRUB SERPL-MCNC: 0.7 MG/DL — SIGNIFICANT CHANGE UP (ref 0.2–1.2)
BUN SERPL-MCNC: 30 MG/DL — HIGH (ref 7–23)
C-ANCA SER-ACNC: NEGATIVE — SIGNIFICANT CHANGE UP
CALCIUM SERPL-MCNC: 8.5 MG/DL — SIGNIFICANT CHANGE UP (ref 8.5–10.1)
CHLORIDE SERPL-SCNC: 105 MMOL/L — SIGNIFICANT CHANGE UP (ref 96–108)
CK MB CFR SERPL CALC: <1 NG/ML — SIGNIFICANT CHANGE UP (ref 0–3.6)
CK SERPL-CCNC: <7 U/L — LOW (ref 26–192)
CO2 SERPL-SCNC: 33 MMOL/L — HIGH (ref 22–31)
CREAT SERPL-MCNC: 0.94 MG/DL — SIGNIFICANT CHANGE UP (ref 0.5–1.3)
EGFR: 61 ML/MIN/1.73M2 — SIGNIFICANT CHANGE UP
EGFR: 61 ML/MIN/1.73M2 — SIGNIFICANT CHANGE UP
GLUCOSE BLDC GLUCOMTR-MCNC: 122 MG/DL — HIGH (ref 70–99)
GLUCOSE BLDC GLUCOMTR-MCNC: 124 MG/DL — HIGH (ref 70–99)
GLUCOSE BLDC GLUCOMTR-MCNC: 136 MG/DL — HIGH (ref 70–99)
GLUCOSE BLDC GLUCOMTR-MCNC: 164 MG/DL — HIGH (ref 70–99)
GLUCOSE SERPL-MCNC: 115 MG/DL — HIGH (ref 70–99)
HCT VFR BLD CALC: 23.8 % — LOW (ref 34.5–45)
HGB BLD-MCNC: 7.8 G/DL — LOW (ref 11.5–15.5)
L PNEUMO DNA SPEC QL NAA+PROBE: SIGNIFICANT CHANGE UP
LEGIONELLA DNA SPEC NAA+PROBE: SIGNIFICANT CHANGE UP
MAGNESIUM SERPL-MCNC: 1.9 MG/DL — SIGNIFICANT CHANGE UP (ref 1.6–2.6)
MCHC RBC-ENTMCNC: 31.7 PG — SIGNIFICANT CHANGE UP (ref 27–34)
MCHC RBC-ENTMCNC: 32.8 G/DL — SIGNIFICANT CHANGE UP (ref 32–36)
MCV RBC AUTO: 96.7 FL — SIGNIFICANT CHANGE UP (ref 80–100)
NRBC # BLD AUTO: 0 K/UL — SIGNIFICANT CHANGE UP (ref 0–0)
NRBC # FLD: 0 K/UL — SIGNIFICANT CHANGE UP (ref 0–0)
NRBC BLD AUTO-RTO: 0 /100 WBCS — SIGNIFICANT CHANGE UP (ref 0–0)
P JIROVECII DNA L RESP QL NAA+NON-PROBE: NEGATIVE — SIGNIFICANT CHANGE UP
P-ANCA SER-ACNC: NEGATIVE — SIGNIFICANT CHANGE UP
PHOSPHATE SERPL-MCNC: 2.6 MG/DL — SIGNIFICANT CHANGE UP (ref 2.5–4.5)
PLATELET # BLD AUTO: 268 K/UL — SIGNIFICANT CHANGE UP (ref 150–400)
PMV BLD: 10.9 FL — SIGNIFICANT CHANGE UP (ref 7–13)
POTASSIUM SERPL-MCNC: 3.7 MMOL/L — SIGNIFICANT CHANGE UP (ref 3.5–5.3)
POTASSIUM SERPL-SCNC: 3.7 MMOL/L — SIGNIFICANT CHANGE UP (ref 3.5–5.3)
PROT SERPL-MCNC: 5.5 G/DL — LOW (ref 6–8.3)
RBC # BLD: 2.46 M/UL — LOW (ref 3.8–5.2)
RBC # FLD: 19.6 % — HIGH (ref 10.3–14.5)
SODIUM SERPL-SCNC: 144 MMOL/L — SIGNIFICANT CHANGE UP (ref 135–145)
SPECIMEN SOURCE: SIGNIFICANT CHANGE UP
SPECIMEN SOURCE: SIGNIFICANT CHANGE UP
TROPONIN I, HIGH SENSITIVITY RESULT: 26.1 NG/L — SIGNIFICANT CHANGE UP
WBC # BLD: 9.83 K/UL — SIGNIFICANT CHANGE UP (ref 3.8–10.5)
WBC # FLD AUTO: 9.83 K/UL — SIGNIFICANT CHANGE UP (ref 3.8–10.5)

## 2025-06-19 PROCEDURE — 99233 SBSQ HOSP IP/OBS HIGH 50: CPT | Mod: GC

## 2025-06-19 PROCEDURE — 99233 SBSQ HOSP IP/OBS HIGH 50: CPT

## 2025-06-19 PROCEDURE — 71045 X-RAY EXAM CHEST 1 VIEW: CPT | Mod: 26

## 2025-06-19 PROCEDURE — 93010 ELECTROCARDIOGRAM REPORT: CPT

## 2025-06-19 RX ORDER — TORSEMIDE 20 MG/1
20 TABLET ORAL ONCE
Refills: 0 | Status: COMPLETED | OUTPATIENT
Start: 2025-06-19 | End: 2025-06-19

## 2025-06-19 RX ADMIN — Medication 650 MILLIGRAM(S): at 05:43

## 2025-06-19 RX ADMIN — PREDNISONE 5 MILLIGRAM(S): 20 TABLET ORAL at 05:29

## 2025-06-19 RX ADMIN — Medication 650 MILLIGRAM(S): at 21:57

## 2025-06-19 RX ADMIN — GABAPENTIN 400 MILLIGRAM(S): 400 CAPSULE ORAL at 05:29

## 2025-06-19 RX ADMIN — ATORVASTATIN CALCIUM 10 MILLIGRAM(S): 80 TABLET, FILM COATED ORAL at 21:54

## 2025-06-19 RX ADMIN — Medication 650 MILLIGRAM(S): at 22:50

## 2025-06-19 RX ADMIN — GABAPENTIN 400 MILLIGRAM(S): 400 CAPSULE ORAL at 17:27

## 2025-06-19 RX ADMIN — Medication 1 DOSE(S): at 07:26

## 2025-06-19 RX ADMIN — Medication 40 MILLIGRAM(S): at 10:58

## 2025-06-19 RX ADMIN — Medication 650 MILLIGRAM(S): at 00:50

## 2025-06-19 RX ADMIN — IPRATROPIUM BROMIDE AND ALBUTEROL SULFATE 3 MILLILITER(S): .5; 2.5 SOLUTION RESPIRATORY (INHALATION) at 01:44

## 2025-06-19 RX ADMIN — Medication 1 APPLICATION(S): at 05:43

## 2025-06-19 RX ADMIN — TORSEMIDE 20 MILLIGRAM(S): 20 TABLET ORAL at 05:29

## 2025-06-19 RX ADMIN — TORSEMIDE 20 MILLIGRAM(S): 20 TABLET ORAL at 17:27

## 2025-06-19 RX ADMIN — AMIODARONE HYDROCHLORIDE 100 MILLIGRAM(S): 50 INJECTION, SOLUTION INTRAVENOUS at 05:29

## 2025-06-19 RX ADMIN — IPRATROPIUM BROMIDE AND ALBUTEROL SULFATE 3 MILLILITER(S): .5; 2.5 SOLUTION RESPIRATORY (INHALATION) at 20:03

## 2025-06-19 RX ADMIN — Medication 650 MILLIGRAM(S): at 07:48

## 2025-06-19 RX ADMIN — Medication 650 MILLIGRAM(S): at 00:13

## 2025-06-19 RX ADMIN — Medication 1 DOSE(S): at 21:55

## 2025-06-19 RX ADMIN — INSULIN LISPRO 1: 100 INJECTION, SOLUTION INTRAVENOUS; SUBCUTANEOUS at 12:06

## 2025-06-19 RX ADMIN — IPRATROPIUM BROMIDE AND ALBUTEROL SULFATE 3 MILLILITER(S): .5; 2.5 SOLUTION RESPIRATORY (INHALATION) at 13:27

## 2025-06-19 RX ADMIN — IPRATROPIUM BROMIDE AND ALBUTEROL SULFATE 3 MILLILITER(S): .5; 2.5 SOLUTION RESPIRATORY (INHALATION) at 07:23

## 2025-06-19 NOTE — PROGRESS NOTE ADULT - SUBJECTIVE AND OBJECTIVE BOX
CHIEF COMPLAINT/ REASON FOR VISIT  .. Patient was seen to address the  issue listed under PROBLEM LIST which is located toward bottom of this note     MARTINEZ PATEL TELE 306 W1    Allergies    No Known Allergies    Intolerances        PAST MEDICAL & SURGICAL HISTORY:  COPD (chronic obstructive pulmonary disease)      DM (diabetes mellitus)  diet-controlled      PAF (paroxysmal atrial fibrillation)  s/p ablation      Hiatal hernia      H/O aplastic anemia      History of IBS      H/O osteoporosis      HLD (hyperlipidemia)      PMR (polymyalgia rheumatica)      History of basal cell cancer      Chronic kidney disease (CKD)      Hypotension      Presence of IVC filter      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      History of immunodeficiency      Lumbar compression fracture      H/O hiatal hernia      H/O pulmonary fibrosis      H/O sinus bradycardia      History of fracture of right hip  "unoperable"      S/P hysterectomy      S/P foot surgery      S/P knee surgery      After cataract  bilateral eye cataract surgically removed      Closed right hip fracture  rigth hip ORIF july 19 2016      S/P IVC filter  nov 2016      S/P carpal tunnel release  Right 2008 & 6/27/17      H/O kyphoplasty      Port-A-Cath in place  right chest          FAMILY HISTORY:  Family history of bladder cancer (Mother)    Family history of myocardial infarction (Father)    FH: atrial fibrillation (Father)        Home Medications:  amiodarone 200 mg oral tablet: 0.5 tab(s) orally once a day (08 Jun 2025 16:43)  Bentyl 10 mg oral capsule: 1 cap(s) orally 2 times a day, As Needed (08 Jun 2025 16:43)  Eliquis 2.5 mg oral tablet: 1 tab(s) orally 2 times a day (08 Jun 2025 16:43)  esomeprazole 20 mg oral delayed release capsule: 1 cap(s) orally once a day (08 Jun 2025 16:43)  fluticasone-salmeterol 500 mcg-50 mcg/inh inhalation powder: 1 puff(s) inhaled 2 times a day (08 Jun 2025 16:43)  folic acid 1 mg oral tablet: 1 tab(s) orally once a day (in the morning) (08 Jun 2025 16:43)  gabapentin 400 mg oral capsule: 1 cap(s) orally 2 times a day (08 Jun 2025 16:43)  ipratropium-albuterol 0.5 mg-2.5 mg/3 mL inhalation solution: 3 milliliter(s) inhaled every 6 hours As needed Shortness of Breath and/or Wheezing (08 Jun 2025 16:43)  IVIG monthly:  (08 Jun 2025 16:43)  leflunomide 10 mg oral tablet: 1 tab(s) orally once a day (08 Jun 2025 16:43)  midodrine 10 mg oral tablet: 1 tab(s) orally every 8 hours (08 Jun 2025 16:43)  montelukast 10 mg oral tablet: 1 tab(s) orally once a day (08 Jun 2025 16:43)  pantoprazole 40 mg oral delayed release tablet: 1 tab(s) orally once a day (before a meal) (08 Jun 2025 16:43)  predniSONE 5 mg oral tablet: 1 tab(s) orally once a day (08 Jun 2025 16:43)  Procrit 10,000 units/mL preservative-free injectable solution: injectable once a week WEDNESDAY (08 Jun 2025 16:43)  Reclast Infusion , IV x 1 yearly:  (08 Jun 2025 16:43)  simvastatin 10 mg oral tablet: 1 tab(s) orally once a day (at bedtime) (08 Jun 2025 16:43)  tiotropium 2.5 mcg/inh inhalation aerosol: 2 puff(s) inhaled once a day (08 Jun 2025 16:43)  tiZANidine: 2 tab(s) orally once a day (at bedtime) as needed for  muscle spasm (08 Jun 2025 16:43)  torsemide 20 mg oral tablet: 1 tab(s) orally once a day (in the morning) (08 Jun 2025 16:49)      MEDICATIONS  (STANDING):  albuterol/ipratropium for Nebulization 3 milliLiter(s) Nebulizer every 6 hours  aMIOdarone    Tablet 100 milliGRAM(s) Oral daily  atorvastatin 10 milliGRAM(s) Oral at bedtime  chlorhexidine 2% Cloths 1 Application(s) Topical <User Schedule>  dextrose 50% Injectable 25 Gram(s) IV Push once  dextrose 50% Injectable 12.5 Gram(s) IV Push once  dextrose 50% Injectable 25 Gram(s) IV Push once  fluticasone propionate/ salmeterol 500-50 MICROgram(s) Diskus 1 Dose(s) Inhalation two times a day  gabapentin 400 milliGRAM(s) Oral every 12 hours  insulin lispro (ADMELOG) corrective regimen sliding scale   SubCutaneous three times a day before meals  insulin lispro (ADMELOG) corrective regimen sliding scale   SubCutaneous at bedtime  pantoprazole  Injectable 40 milliGRAM(s) IV Push daily  predniSONE   Tablet 5 milliGRAM(s) Oral daily  torsemide 20 milliGRAM(s) Oral daily    MEDICATIONS  (PRN):  acetaminophen     Tablet .. 650 milliGRAM(s) Oral every 6 hours PRN Mild Pain (1 - 3)  dextrose Oral Gel 15 Gram(s) Oral once PRN Blood Glucose LESS THAN 70 milliGRAM(s)/deciliter      Diet, Regular:   DASH/TLC Sodium & Cholesterol Restricted (06-16-25 @ 21:27) [Active]          Vital Signs Last 24 Hrs  T(C): 36.9 (19 Jun 2025 04:58), Max: 37.1 (18 Jun 2025 11:40)  T(F): 98.5 (19 Jun 2025 04:58), Max: 98.7 (18 Jun 2025 11:40)  HR: 87 (19 Jun 2025 07:25) (82 - 93)  BP: 136/67 (19 Jun 2025 04:58) (101/58 - 136/67)  BP(mean): --  RR: 18 (19 Jun 2025 04:58) (18 - 20)  SpO2: 95% (19 Jun 2025 07:25) (91% - 100%)    Parameters below as of 19 Jun 2025 07:25  Patient On (Oxygen Delivery Method): nasal cannula          06-18-25 @ 07:01  -  06-19-25 @ 07:00  --------------------------------------------------------  IN: 0 mL / OUT: 1400 mL / NET: -1400 mL              LABS:                        7.8    9.83  )-----------( 268      ( 19 Jun 2025 06:55 )             23.8     06-19    144  |  105  |  30[H]  ----------------------------<  115[H]  3.7   |  33[H]  |  0.94    Ca    8.5      19 Jun 2025 06:55  Phos  2.6     06-19  Mg     1.9     06-19    TPro  5.5[L]  /  Alb  2.7[L]  /  TBili  0.7  /  DBili  x   /  AST  13[L]  /  ALT  18  /  AlkPhos  38[L]  06-19    PT/INR - ( 18 Jun 2025 07:50 )   PT: 15.9 sec;   INR: 1.36 ratio           Urinalysis Basic - ( 19 Jun 2025 06:55 )    Color: x / Appearance: x / SG: x / pH: x  Gluc: 115 mg/dL / Ketone: x  / Bili: x / Urobili: x   Blood: x / Protein: x / Nitrite: x   Leuk Esterase: x / RBC: x / WBC x   Sq Epi: x / Non Sq Epi: x / Bacteria: x            WBC:  WBC Count: 9.83 K/uL (06-19 @ 06:55)  WBC Count: 11.98 K/uL (06-18 @ 07:50)  WBC Count: 9.69 K/uL (06-17 @ 09:25)  WBC Count: 16.85 K/uL (06-16 @ 06:10)      MICROBIOLOGY:  RECENT CULTURES:  06-16 Bronch Wash Bronchial Wash XXXX   Rare Squamous epithelial cells seen per low power field  No polymorphonuclear leukocytes seen per low power field  No organisms seen per oil power field   Commensal marilee consistent with body site    06-16 Bronch Wash Bronchial Wash XXXX XXXX   Culture is being performed.    06-16 Bronch Wash Bronchial Wash XXXX   Rare polymorphonuclear leukocytes per oil power field  No Squamous epithelial cells per oil power field  No organisms seen per oil power field   Commensal marilee consistent with body site          CARDIAC MARKERS ( 19 Jun 2025 06:55 )  x     / x     / x     / x     / <1.0 ng/mL        PT/INR - ( 18 Jun 2025 07:50 )   PT: 15.9 sec;   INR: 1.36 ratio             Sodium:  Sodium: 144 mmol/L (06-19 @ 06:55)  Sodium: 144 mmol/L (06-18 @ 07:50)  Sodium: 142 mmol/L (06-17 @ 09:25)  Sodium: 143 mmol/L (06-16 @ 06:10)      0.94 mg/dL 06-19 @ 06:55  0.97 mg/dL 06-18 @ 07:50  1.20 mg/dL 06-17 @ 09:25  0.99 mg/dL 06-16 @ 06:10      Hemoglobin:  Hemoglobin: 7.8 g/dL (06-19 @ 06:55)  Hemoglobin: 8.5 g/dL (06-18 @ 07:50)  Hemoglobin: 8.1 g/dL (06-17 @ 09:25)  Hemoglobin: 9.6 g/dL (06-16 @ 06:10)      Platelets: Platelet Count - Automated: 268 K/uL (06-19 @ 06:55)  Platelet Count - Automated: 323 K/uL (06-18 @ 07:50)  Platelet Count - Automated: 330 K/uL (06-17 @ 09:25)  Platelet Count - Automated: 306 K/uL (06-16 @ 06:10)      LIVER FUNCTIONS - ( 19 Jun 2025 06:55 )  Alb: 2.7 g/dL / Pro: 5.5 g/dL / ALK PHOS: 38 U/L / ALT: 18 U/L / AST: 13 U/L / GGT: x             Urinalysis Basic - ( 19 Jun 2025 06:55 )    Color: x / Appearance: x / SG: x / pH: x  Gluc: 115 mg/dL / Ketone: x  / Bili: x / Urobili: x   Blood: x / Protein: x / Nitrite: x   Leuk Esterase: x / RBC: x / WBC x   Sq Epi: x / Non Sq Epi: x / Bacteria: x        RADIOLOGY & ADDITIONAL STUDIES:      MICROBIOLOGY:  RECENT CULTURES:  06-16 Bronch Wash Bronchial Wash XXXX   Rare Squamous epithelial cells seen per low power field  No polymorphonuclear leukocytes seen per low power field  No organisms seen per oil power field   Commensal marilee consistent with body site    06-16 Bronch Wash Bronchial Wash XXXX XXXX   Culture is being performed.    06-16 Bronch Wash Bronchial Wash XXXX   Rare polymorphonuclear leukocytes per oil power field  No Squamous epithelial cells per oil power field  No organisms seen per oil power field   Commensal marilee consistent with body site

## 2025-06-19 NOTE — CHART NOTE - NSCHARTNOTEFT_GEN_A_CORE
called by RN ~@4:45 am that pt is C/O chest pain. Pt seen and examined by the writer on bedside. Pt C/O localized chest pain in L 4th to 5th ICS anteriorly, no radiation, no SOB, palpitations, HA, dizziness, lightheadedness, NV.       GENERAL: NAD  HEENT:  anicteric, moist mucous membranes  CHEST/LUNG:  +  rales notable on upper B/L, + wheeze L lung  HEART:  RRR, S1, S2  ABDOMEN:  BS+, soft, nontender, nondistended  EXTREMITIES: no edema, cyanosis, or calf tenderness  NERVOUS SYSTEM: answers questions and follows commands appropriately  SKIN: No rashes or lesions.  PSYCH: normal affect & behavior.     A/P:  84F h/o AFib on Eliquis, COPD, CKD, aplastic anemia, polymyalgia rheumatica on chronic steroids, avascular necrosis of hips, HTN, HLD, with recent admission to [place] 5/26 - 6/5 for acute HFpEF exacerbation and COPD exacerbation, discharged to rehab on 6/5 and returning today with right sided chest pain, general malaise and feeling unwell. She reports some slight cough though otherwise no other new symptoms reported. Found to be hypotensive, febrile w/ leukocytosis. Admitted to ICU for septic shock likely 2/2 to PNA and KIMBERLY. Now stable for downgrade to [facility].    #Chest Pain  - localized chest pain in L 4th to 5th ICS anteriorly, worsens with deep inspiration  - EKG - HR @88 with Sinus rhythm with premature atrial complexes Left anterior fascicular block  - likely pleuritic chest pain   - tylenol for analgesia   - urgent CXR ordered   - stat cardiac enzymes ordered  - will follow; or have the am primary team to follow results and proceed called by RN ~@4:45 am that pt is C/O chest pain. Pt seen and examined by the writer on bedside. Pt C/O localized chest pain in L 4th to 5th ICS anteriorly, no radiation, no SOB, palpitations, HA, dizziness, lightheadedness, NV.       GENERAL: NAD  HEENT:  anicteric, moist mucous membranes  CHEST/LUNG:  +  rales notable on upper B/L, + wheeze L lung  HEART:  RRR, S1, S2  ABDOMEN:  BS+, soft, nontender, nondistended  EXTREMITIES: no edema, cyanosis, or calf tenderness  NERVOUS SYSTEM: answers questions and follows commands appropriately  SKIN: No rashes or lesions.  PSYCH: normal affect & behavior.     A/P:  84F h/o AFib on Eliquis, COPD, CKD, aplastic anemia, polymyalgia rheumatica on chronic steroids, avascular necrosis of hips, HTN, HLD, with recent admission to [place] 5/26 - 6/5 for acute HFpEF exacerbation and COPD exacerbation, discharged to rehab on 6/5 and returning today with right sided chest pain, general malaise and feeling unwell. She reports some slight cough though otherwise no other new symptoms reported. Found to be hypotensive, febrile w/ leukocytosis. Admitted to ICU for septic shock likely 2/2 to PNA and KIMBERLY. Now stable for downgrade to [facility].    #Chest Pain  - localized chest pain in L 4th to 5th ICS anteriorly, worsens with deep inspiration  - EKG - HR @88 with Sinus rhythm with premature atrial complexes Left anterior fascicular block  - likely pleuritic chest pain   - tylenol for analgesia   - urgent CXR ordered -> wet personal read: likely worsening PNA  - stat cardiac enzymes ordered, collection pending until 6:53 am  - am labs, collection pending until 6:53 am  - am primary team to follow results and proceed

## 2025-06-19 NOTE — PROGRESS NOTE ADULT - ASSESSMENT
Prerenal azotemia, CKD 3  Dyspnea: Pneumonia, h/o COPD/CHF  s/p Shock   Diabetes  Aplastic anemia, requiring frequent blood transfusions  Hemoptysis    Stable renal indices. Low h/h. PRBC transfusion. To continue current meds. Monitor blood sugar levels. Insulin coverage as needed. Trend BP and titrate BP meds as needed.   Avoid nephrotoxic meds as possible. Pulmonary follow up. Will follow electrolytes and renal function trend. D/c planning. D/w pt's daughter at bedside.

## 2025-06-19 NOTE — PROGRESS NOTE ADULT - PROBLEM SELECTOR PLAN 10
on home prednisone 5mg daily  and leflunomide (10mg) if restarted Patient will need to bring in home medication to bring to pharmacy to dispense.  -continue home prednisone

## 2025-06-19 NOTE — PROGRESS NOTE ADULT - ASSESSMENT
REASON FOR VISIT  .. Management of problems listed below      EVENTS/CURRENT ISSUES.  . 6/16/2025 fob done oozing rul and l lo lobe post basal   . 6/14/2025 iv ufh dced   . 6/13/2025 continues to have mild hemoptysis but is also on iv ufh drip   . 6/13/2025 dw pt re rbaa of bronch and se agrees scheduled for 6/16 10 in or   . 6/13/2025 dw cardio and id   . 6/11/2025 qft funfitell and galactomannan orderd   . 6/11/2025 pt wa sstarted on iv ufh for hemoptysis and pleuritic chest pain but was stopped when vq showed vlp   . 6/10/2025 Mild hemoptysis   . 6/9 tr out of icu  . 6/8/2025  . PNEUMONIA RML 6/8/2025  . SHOCK 6/8/2025   . NOREPI 6/8/2025   . STRESS STEROIDS   .... HYDROCORTISONE 6/8/2025  . A fib (chronic) POA 6/8/2025  . ANEMIA Hb 6/8/2025 Hb 7.5  . KIMBERLY ON CKD Cr 6/8/2025 Cr 1.9        REVIEW OF SYMPTOMS   Able to give ROS  Yes     RELIABILITY +/-   CONSTITUTIONAL Weakness Yes    ENDOCRINE  No heat or cold intolerance    ALLERGY No hives  Sore throat No stridor  RESP Shortness of breath YES   NEURO New weakness No   CARDIAC   Palpitations No         PHYSICAL EXAM    HEENT Unremarkable  atraumatic   RESP Fair air entry  Harsh breath sound   CARDIAC S1 S2 No S3     NO JVD    ABDOMEN No hepatosplenomegaly   PEDAL EDEMA present No calf tenderness    REASON FOR VISIT  .. Management of problems listed below      CC.   . 6/8/2025 brought in by ems for right sided chest pain that started at 3am- nonradiating- patient was offered aspirin by ems- patient refused and stated to ems that she is on plavix and was advised not to take aspirin. patient on 43 litres nasl cannula-     OVERALL PRESENTATION.  . 6/8/2025 84 yr old female with PMHx afib on eliquis, COPD (not on home O2), PE/Rt lower extremity DVT 2017, Rt LE IVC filter 2017 CKD3, aplastic anemia, polymyalgia rheumatica on prednisone 5 mg po qd, requiring PRBC transfusions ~8 weeks (via Rt subclavian mediport), AVN bilat hips, HTN, HLD, HFpEF (75% 6.2.25), diastolic dysfx grade1, recent hospitalization 5/25/25-6/5/25 for ADHF/COPD exacerbation, Pt with eventual improvement and transfer to [facility] facility from where she presents to E.D. this a.m. 6/8/25 with c/o Rt sided chest pain. general malaise. Pt stated began to feel ill evening before presentation, daughter this a.m. endorsed pt lethargic, pale.     In E.D. Pt found to have fever with tmax of 101, KIMBERLY on CKD with sCr 1.90 (baseline 1.2-1.6), HYPOtnsive with SBP 80's (pt on midodrine therapy, usual 's). In addition found to have leukocytosis of 37.6 (baseline 5.25 6/5/25), procalcitonin of 13.6, Hgb 7.5, elevated INR 2.27,  Chest xray demonstrated RML consolidations, concerning for pneum. In E.D. pt receiving 500 cc's NS, Vanco 1 gm, zosyn. Pt received tylenol 1 gm IV x1.     PMH.        BEST PRACTICE ISSUES.  . HOB ELEVATN.    .... Yes  . DIET  .   .... DASH 6/8/2025   . FREE WATER.   ....   .  IV FLUID.  .... 6/15/2025 1/2 ns 50   ..... 6/8 lr 40 x 12 h   . PHARMAC DVT PPLX .    .... 6/12-6/14/2025 iv ufh (hospitalist)  .... 6/11/2025 [facility] 5k.2   .... 6/10/2025 4:46 PM iv ufh   .... [facility] 6/9-6/10/2025   . NON PHARMAC DVT PPLX .      . STRESS ULCR PPLX .   ....   . DATE/DM MGMT.   ..... See under Endocrine section   GENERAL DATA .   . COVID.         .... scv2 6/8/2025 scv2 (-)   . GOC.    ....    . ICU STAY.    .... 6/8-6/9   . INFECTION PPLX .   .... CHLORHEXIDINE 2% 6/8/2025   . ALLGY.   ....  nka  . WT.   .... 6/8/2025 69   . BMI.  ....6/8/2025 26      [id number]  VITALS/GAS EXCHANGE/DRIPS    ABGS.     .  VS/ PO/IO/ VENT/ DRIPS.  6/19/2025 afeb 82 120/60   6/19/2025 4l 94%     XXXXXXXXXXXXXXXXXXXXXXXXXXXXXXXXXXX  PROBLEM ASSESSMENT RECOMMENDATIONS.  RESP.   . GAS EXCHANGE .   .... target PO 90-95%     . NEED FOR HOME OXYGEN   .... 6/18/2025 will need home oxygen if at discharge pulse ox at rest or on exertion is below 88%     . COPD   .... ADVAIR 500 6/8/2025   .... MONTELUKAST 10 6/8/2025   .... SPIRIVA 6/8/2025     . RESP FAILURE  .... 6/8/2025 Has ho hypercapnia   .... 6/8/2025 recommend monitor abg  .... 6/18/2025 requiring 4l nc     . MILD HEMOPTYSIS 6/10/2025   .... ct ch 6/10/2025 cw 5/31  ........ new mf infection in rul   ........ unchanged small r greater than left pl effsn   ....... enlarged pulm artery suggesting pulm hytn   .... 6/10/2025  dw hemat cardio id and instead of restarting apixa will start iv UFH which can be easily reversed in case Hb starts dropping but will be the rx for venous thromboembolism as well as for a fib   .... v duplx 6/10 (-)  .... vq 6/10 vlp   .... 6/13/2025 continues to have mild hemoptysis but is also on iv ufh drip   . 6/13/2025 dw pt re rbaa of bronch and se agrees scheduled for 6/16 10 in or   .... 6/13/2025 dw cardio and id   .... 6/16 fob done showed ooze rul and l lo lobe no eb lesions   .... 6/17/2025 continues to have mild hemoptysis   .... 6/17/2025 30% of hemoptysis cases no cause is found  Ordered gbm ab rf dsdna rf apla chapman   .... 6/18/2025 cbmab dsdna inderjit apla ab all (-)   .... 6/12 fungitell galactomannan (-)   .... strep legn ag 6/9 (-)   .... fob 6/16 afb sm (-) cyto (-)  .... 6/18/2025 hemoptysis likely sec to pneumonia in setting of pulm hypertension     . PLEURITIC CHEST PAIN RO VTE   .... v duplx 6/10 (-)  .... vq 6/10 vlp   .... 6/12/2025 iv ufh was stopped 6/11 as vq vlp but was restarted by hospitalist 6/12/2025 for af   .... chest pain resolvd       INFECTION.  . DATA  .... w 6/8-6/10-6/13-6/14-6/15-6/17-6/18-6/19/2025   .... w 13.6 - 9.2 - 15.9 - 18- 14 - 9.6 - 11.9- 9.8   .... esr 6/8/2025 esr 17   .... w 6/8-6/9/2025 w 32 - 24   .... ua 6/8/2025 w 4   .... cxr 6/8/2025 cxr dense infiltra r upper hilum r mediport  .... ct ch 6/10 cw 5/31  ........ r greater than l effs   ........ partial rll atelectasis   ........ new patchy and nodular areas of consolidation rul and associated ground glass opacities   ........ ground glass and nodular opac also scott   ........ numerous tib rml and rll     . MICROBIO  .... sp 6/8 n commensals   .... flu ab 6/8/2025 (-)   .... rsv 6/8/2025 (-)    .... mrsa 6/9/2025 (-)  .... uc 6/8 (-)  .... bc 6/8 (-)     . PNEUMONIA RUL 6/8/2025   .... AZITHRO 6/8-6/11 /2025   .... ZOSYN 6/8-6/15/2025     CARDIAC.  . PAIN CHEST   .... 6/10/2025 co pain chest r   .... 6/10/2025 check ct to see if she has pleurisy   .... 6/10/2025  dw hemat cardio id and instead of restarting apixa will start iv UFH which can be easily reversed in case Hb starts dropping but will be the rx for venous thromboembolism as well as for a fib     . STRESS STEROIDS   .... HYDROCORTISONE   ........ 6/8/2025 h 50.4  ........ 6/10/2025 h 50.2   ........ 6/12/2025 hydrocort 50   ....... 6/14/2025 h 25  ........ 6/17/2025 dced     . SHOCK   .... MIDODRINE 6/8-6/16/2025 m 10.3   .... DROXIDOPA 6/9-6/16/2025 d 100.3   .... NOREPI 6/8-6/9/2025 n 8/250   .... target map 65 (+)     . CAD    .... Trop 6/8-6/9/2025 Tr 32- 24     . LACTICEMIA   .... la 6/8/2025 la 1.4     . CHF  .... pbnp 6/8/2025 pbnp 6599   .... tte 4/2025 mild ph  n vf  .... tte 6/2/2025   ........ ef 71%   ........ n rvsf    ........ pasp 54   ....... la mod dilatd   .... torsemide 20 6/17/2025  .... lasix 40 once 6/14/2025    . A fib (chronic) POA 6/8/2025  .... AMIODARONE 100 6/8/2025  .... 6/10/2025  dw hemat cardio id and instead of restarting apixa will start iv UFH which can be easily reversed in case Hb starts dropping but will be the rx for venous thromboembolism as well as for a fib   .... 6/12-6/14/2025 iv ufh     HEMAT.  . DATA  .... Plt 6/8/2025 plt 153   .... inr 6/8-6/9/2025 inr 2.27- 1.63      . ANEMIA   .... Hb 6/19/2025 Hb 7.8   .... Hb 6/8-6/9-6/10-6/11-6/13-6/14-6/15-6/17-6/18/2025   .... Hb 7.5- 7.8 - 7.6- 7.1 - 8.4 - 7.3- 9.1 - 8.1 - 8.5    . TRANSFUSION  .... 6/8  1 u prbc    .... 6/14 2 u prbc     GI.   . DIET .   .... dash 6/8/2025     . LFT MONITORING   .... LFTS    6/8/2025  ........ AP     39  ........ AST   11  ........ ALT    35    RENAL.  . DATA  .... Na 6/8/2025 Na 137  .... K 6/8/2025 K 4   .... CO2 6/8/2025 co2 29   .... ag 6/8/2025 ag 9  .... alb 6/8/2025 alb 3.4     . KIMBERLY ON CKD   .... Cr 6/8-6/9-6/10-6/11-6/13-6/14/2025   .... Cr 1.9 - 1.4 - 1.3 - 1.3 - 1.1 - 1  .... Cr 5/27-5/28-5/29-5/30-6/1/2025   .... Cr 1.2 - 1.3 - 1.3 - 1.6 - 1.6    ENDO.  . DM  .  .... 6/8/2025 SL SCALE     NEURO.  . PAIN  .... GABAPENTIN 6/8/2025 g 400.2       XXXXXXXXXXXXXXXXXXXXXX   SUMMARY BASELINE .     84 yr old female pt of Dr Gallegos not on home ox or home cpap used to use trelegy lives with spouse quit 40 y 1/2 ppd with PMHx afib on eliquis, COPD (not on home O2), PE/Rt lower extremity DVT 2017, Rt LE IVC filter 2017 CKD3, aplastic anemia, polymyalgia rheumatica on prednisone 5 mg po qd, requiring PRBC transfusions around 8 weeks (via Rt subclavian mediport), AVN bilat hips, HTN, HLD, HFpEF (75% 6.2.25), diastolic dysfx grade1, recent hospitalization 5/25/25-6/5/25 for ADHF/COPD exacerbation, Pt with eventual improvement and transfer to [facility] facility   CC.   . 6/8/2025 R CHEST PAIN   MAIN ISSUES.  . COPD   . MILD HEMOPTYSOIS 6/10/2025   . PNEUMONIA RML 6/8/2025  .... ZOSYN 6/8/2025   . PLEURITIS CHEST PAIN 6/10/2025  .... 6/10 vte excluded vlp vq   . SHOCK 6/8/2025   .... resolvd tr oo icu 6/9   . NOREPI 6/8-6/9/2025   . STRESS STEROIDS   .... HYDROCORTISONE 6/8-6/17/2025  . A fib (chronic) POA 6/8/2025  .... 6/10-6/10/2025 IV UFH   .... 6/12- 6/14 IV UFH   . ANEMIA Hb 6/8-6/13/2025 Hb 7.5- 8.4  . TRANSFUSION  .... 6/8  1 u prbc    . KIMBERLY ON CKD Cr 6/8-6/13/2025 Cr 1.9- 1.1  PMH.   . AFon Eliquis,   . COPD (not on home o2),   . CKD,   . aplastic anemia,   . TRANSFUSION 6/2/2025 1 u prbc   . PMR (chronic prednisone with AVN hip),   . HLD,   . HTN,   . DM    PROCEDURES/DEVICES .  . 6/16/2025 FOB br wash rul ooze rul l lo lobe post basal     PAST PROCEDURES   . r mediport poa 6/8/2025     DISCUSSIONS.  .... Discussed with primary care and relevant consultants on an ongoing basis       TIME SPENT.  . Over 36 minutes aggregate care time spent on encounter; activities included   direct patient care, counseling and/or coordinating care reviewing notes, lab data/ imaging , discussion with multidisciplinary team/ patient  /family and explaining in detail risks, benefits, alternatives  of the recommendations     ROBERT HENRIQUEZ 84 6/8/2025 1942

## 2025-06-19 NOTE — PROGRESS NOTE ADULT - SUBJECTIVE AND OBJECTIVE BOX
Patient is a 84y old  Female who presents with a chief complaint of septic shock, PNA (19 Jun 2025 09:37)    ---incomplete---    INTERVAL HPI/OVERNIGHT EVENTS: No acute overnight events. Pt seen and examined at the bedside this AM.     MEDICATIONS  (STANDING):  albuterol/ipratropium for Nebulization 3 milliLiter(s) Nebulizer every 6 hours  aMIOdarone    Tablet 100 milliGRAM(s) Oral daily  atorvastatin 10 milliGRAM(s) Oral at bedtime  chlorhexidine 2% Cloths 1 Application(s) Topical <User Schedule>  dextrose 50% Injectable 25 Gram(s) IV Push once  dextrose 50% Injectable 12.5 Gram(s) IV Push once  dextrose 50% Injectable 25 Gram(s) IV Push once  fluticasone propionate/ salmeterol 500-50 MICROgram(s) Diskus 1 Dose(s) Inhalation two times a day  gabapentin 400 milliGRAM(s) Oral every 12 hours  insulin lispro (ADMELOG) corrective regimen sliding scale   SubCutaneous three times a day before meals  insulin lispro (ADMELOG) corrective regimen sliding scale   SubCutaneous at bedtime  pantoprazole  Injectable 40 milliGRAM(s) IV Push daily  predniSONE   Tablet 5 milliGRAM(s) Oral daily  torsemide 20 milliGRAM(s) Oral daily  torsemide 20 milliGRAM(s) Oral once    MEDICATIONS  (PRN):  acetaminophen     Tablet .. 650 milliGRAM(s) Oral every 6 hours PRN Mild Pain (1 - 3)  dextrose Oral Gel 15 Gram(s) Oral once PRN Blood Glucose LESS THAN 70 milliGRAM(s)/deciliter      Allergies    No Known Allergies    Intolerances        REVIEW OF SYSTEMS:  CONSTITUTIONAL: No fever or chills  HEENT:  No headache, no sore throat  RESPIRATORY: No cough, wheezing, or shortness of breath  CARDIOVASCULAR: No chest pain, palpitations  GASTROINTESTINAL: No abd pain, nausea, vomiting, or diarrhea  GENITOURINARY: No dysuria, frequency, or hematuria  NEUROLOGICAL: no focal weakness or dizziness  MUSCULOSKELETAL: no myalgias     Vital Signs Last 24 Hrs  T(C): 36.9 (19 Jun 2025 04:58), Max: 37.1 (18 Jun 2025 11:40)  T(F): 98.5 (19 Jun 2025 04:58), Max: 98.7 (18 Jun 2025 11:40)  HR: 87 (19 Jun 2025 07:25) (82 - 93)  BP: 136/67 (19 Jun 2025 04:58) (101/58 - 136/67)  BP(mean): --  RR: 18 (19 Jun 2025 04:58) (18 - 20)  SpO2: 95% (19 Jun 2025 07:25) (91% - 100%)    Parameters below as of 19 Jun 2025 07:25  Patient On (Oxygen Delivery Method): nasal cannula        PHYSICAL EXAM:  GENERAL: NAD  HEENT:  anicteric, moist mucous membranes  CHEST/LUNG:  CTA b/l, no rales, wheezes, or rhonchi  HEART:  RRR, S1, S2  ABDOMEN:  BS+, soft, nontender, nondistended  EXTREMITIES: no edema, cyanosis, or calf tenderness  NERVOUS SYSTEM: answers questions and follows commands appropriately    LABS:                        7.8    9.83  )-----------( 268      ( 19 Jun 2025 06:55 )             23.8     CBC Full  -  ( 19 Jun 2025 06:55 )  WBC Count : 9.83 K/uL  Hemoglobin : 7.8 g/dL  Hematocrit : 23.8 %  Platelet Count - Automated : 268 K/uL  Mean Cell Volume : 96.7 fl  Mean Cell Hemoglobin : 31.7 pg  Mean Cell Hemoglobin Concentration : 32.8 g/dL  Auto Neutrophil # : x  Auto Lymphocyte # : x  Auto Monocyte # : x  Auto Eosinophil # : x  Auto Basophil # : x  Auto Neutrophil % : x  Auto Lymphocyte % : x  Auto Monocyte % : x  Auto Eosinophil % : x  Auto Basophil % : x    19 Jun 2025 06:55    144    |  105    |  30     ----------------------------<  115    3.7     |  33     |  0.94     Ca    8.5        19 Jun 2025 06:55  Phos  2.6       19 Jun 2025 06:55  Mg     1.9       19 Jun 2025 06:55    TPro  5.5    /  Alb  2.7    /  TBili  0.7    /  DBili  x      /  AST  13     /  ALT  18     /  AlkPhos  38     19 Jun 2025 06:55    PT/INR - ( 18 Jun 2025 07:50 )   PT: 15.9 sec;   INR: 1.36 ratio           Urinalysis Basic - ( 19 Jun 2025 06:55 )    Color: x / Appearance: x / SG: x / pH: x  Gluc: 115 mg/dL / Ketone: x  / Bili: x / Urobili: x   Blood: x / Protein: x / Nitrite: x   Leuk Esterase: x / RBC: x / WBC x   Sq Epi: x / Non Sq Epi: x / Bacteria: x      CAPILLARY BLOOD GLUCOSE      POCT Blood Glucose.: 136 mg/dL (19 Jun 2025 07:43)  POCT Blood Glucose.: 127 mg/dL (18 Jun 2025 21:52)  POCT Blood Glucose.: 121 mg/dL (18 Jun 2025 16:32)  POCT Blood Glucose.: 170 mg/dL (18 Jun 2025 12:09)        Culture - Bronchial (collected 06-16-25 @ 13:09)  Source: Bronch Wash Bronchial Wash  Gram Stain (06-16-25 @ 21:58):    Rare Squamous epithelial cells seen per low power field    No polymorphonuclear leukocytes seen per low power field    No organisms seen per oil power field  Final Report (06-18-25 @ 10:28):    Commensal marilee consistent with body site    Culture - Legionella (collected 06-16-25 @ 11:55)  Source: Legionella  Preliminary Report (06-17-25 @ 23:01):    No Legionella species isolated to date    Culture - Fungal, Bronchial (collected 06-16-25 @ 11:55)  Source: Bronchial Bronchial Wash  Preliminary Report (06-19-25 @ 08:55):    No growth    Culture - Acid Fast - Bronchial w/Smear (collected 06-16-25 @ 11:55)  Source: Bronch Wash Bronchial Wash  Preliminary Report (06-18-25 @ 23:07):    Culture is being performed.    Culture - Bronchial (collected 06-16-25 @ 11:53)  Source: Bronch Wash Bronchial Wash  Gram Stain (06-17-25 @ 00:37):    Rare polymorphonuclear leukocytes per oil power field    No Squamous epithelial cells per oil power field    No organisms seen per oil power field  Final Report (06-18-25 @ 08:08):    Commensal marilee consistent with body site        RADIOLOGY & ADDITIONAL TESTS:  Personally reviewed.     Consultant(s) Notes Reviewed:  [x] YES  [ ] NO Patient is a 84y old  Female who presents with a chief complaint of septic shock, PNA (19 Jun 2025 09:37)    INTERVAL HPI/OVERNIGHT EVENTS: Had episode of localalized left sided chest pain, worse with inspiration. Chest x-ray and cardiac enzymes were ordered. Pt seen and examined at the bedside this AM. Endorses she did not have any hemoptysis today.     MEDICATIONS  (STANDING):  albuterol/ipratropium for Nebulization 3 milliLiter(s) Nebulizer every 6 hours  aMIOdarone    Tablet 100 milliGRAM(s) Oral daily  atorvastatin 10 milliGRAM(s) Oral at bedtime  chlorhexidine 2% Cloths 1 Application(s) Topical <User Schedule>  dextrose 50% Injectable 25 Gram(s) IV Push once  dextrose 50% Injectable 12.5 Gram(s) IV Push once  dextrose 50% Injectable 25 Gram(s) IV Push once  fluticasone propionate/ salmeterol 500-50 MICROgram(s) Diskus 1 Dose(s) Inhalation two times a day  gabapentin 400 milliGRAM(s) Oral every 12 hours  insulin lispro (ADMELOG) corrective regimen sliding scale   SubCutaneous three times a day before meals  insulin lispro (ADMELOG) corrective regimen sliding scale   SubCutaneous at bedtime  pantoprazole  Injectable 40 milliGRAM(s) IV Push daily  predniSONE   Tablet 5 milliGRAM(s) Oral daily  torsemide 20 milliGRAM(s) Oral daily  torsemide 20 milliGRAM(s) Oral once    MEDICATIONS  (PRN):  acetaminophen     Tablet .. 650 milliGRAM(s) Oral every 6 hours PRN Mild Pain (1 - 3)  dextrose Oral Gel 15 Gram(s) Oral once PRN Blood Glucose LESS THAN 70 milliGRAM(s)/deciliter      Allergies    No Known Allergies    Intolerances        REVIEW OF SYSTEMS: denies other than mentioned at HPI     Vital Signs Last 24 Hrs  T(C): 36.9 (19 Jun 2025 04:58), Max: 37.1 (18 Jun 2025 11:40)  T(F): 98.5 (19 Jun 2025 04:58), Max: 98.7 (18 Jun 2025 11:40)  HR: 87 (19 Jun 2025 07:25) (82 - 93)  BP: 136/67 (19 Jun 2025 04:58) (101/58 - 136/67)  BP(mean): --  RR: 18 (19 Jun 2025 04:58) (18 - 20)  SpO2: 95% (19 Jun 2025 07:25) (91% - 100%)    Parameters below as of 19 Jun 2025 07:25  Patient On (Oxygen Delivery Method): nasal cannula    PHYSICAL EXAM:  GENERAL: NAD  HEENT:  anicteric, moist mucous membranes  CHEST/LUNG:  CTA b/l, no rales, wheezes, or rhonchi  HEART:  RRR, S1, S2  ABDOMEN:  BS+, soft, nontender, nondistended  EXTREMITIES: no edema, cyanosis, or calf tenderness  NERVOUS SYSTEM: answers questions and follows commands appropriately    LABS:                        7.8    9.83  )-----------( 268      ( 19 Jun 2025 06:55 )             23.8     CBC Full  -  ( 19 Jun 2025 06:55 )  WBC Count : 9.83 K/uL  Hemoglobin : 7.8 g/dL  Hematocrit : 23.8 %  Platelet Count - Automated : 268 K/uL  Mean Cell Volume : 96.7 fl  Mean Cell Hemoglobin : 31.7 pg  Mean Cell Hemoglobin Concentration : 32.8 g/dL  Auto Neutrophil # : x  Auto Lymphocyte # : x  Auto Monocyte # : x  Auto Eosinophil # : x  Auto Basophil # : x  Auto Neutrophil % : x  Auto Lymphocyte % : x  Auto Monocyte % : x  Auto Eosinophil % : x  Auto Basophil % : x    19 Jun 2025 06:55    144    |  105    |  30     ----------------------------<  115    3.7     |  33     |  0.94     Ca    8.5        19 Jun 2025 06:55  Phos  2.6       19 Jun 2025 06:55  Mg     1.9       19 Jun 2025 06:55    TPro  5.5    /  Alb  2.7    /  TBili  0.7    /  DBili  x      /  AST  13     /  ALT  18     /  AlkPhos  38     19 Jun 2025 06:55    PT/INR - ( 18 Jun 2025 07:50 )   PT: 15.9 sec;   INR: 1.36 ratio           Urinalysis Basic - ( 19 Jun 2025 06:55 )    Color: x / Appearance: x / SG: x / pH: x  Gluc: 115 mg/dL / Ketone: x  / Bili: x / Urobili: x   Blood: x / Protein: x / Nitrite: x   Leuk Esterase: x / RBC: x / WBC x   Sq Epi: x / Non Sq Epi: x / Bacteria: x      CAPILLARY BLOOD GLUCOSE      POCT Blood Glucose.: 136 mg/dL (19 Jun 2025 07:43)  POCT Blood Glucose.: 127 mg/dL (18 Jun 2025 21:52)  POCT Blood Glucose.: 121 mg/dL (18 Jun 2025 16:32)  POCT Blood Glucose.: 170 mg/dL (18 Jun 2025 12:09)        Culture - Bronchial (collected 06-16-25 @ 13:09)  Source: Bronch Wash Bronchial Wash  Gram Stain (06-16-25 @ 21:58):    Rare Squamous epithelial cells seen per low power field    No polymorphonuclear leukocytes seen per low power field    No organisms seen per oil power field  Final Report (06-18-25 @ 10:28):    Commensal marilee consistent with body site    Culture - Legionella (collected 06-16-25 @ 11:55)  Source: Legionella  Preliminary Report (06-17-25 @ 23:01):    No Legionella species isolated to date    Culture - Fungal, Bronchial (collected 06-16-25 @ 11:55)  Source: Bronchial Bronchial Wash  Preliminary Report (06-19-25 @ 08:55):    No growth    Culture - Acid Fast - Bronchial w/Smear (collected 06-16-25 @ 11:55)  Source: Bronch Wash Bronchial Wash  Preliminary Report (06-18-25 @ 23:07):    Culture is being performed.    Culture - Bronchial (collected 06-16-25 @ 11:53)  Source: Bronch Wash Bronchial Wash  Gram Stain (06-17-25 @ 00:37):    Rare polymorphonuclear leukocytes per oil power field    No Squamous epithelial cells per oil power field    No organisms seen per oil power field  Final Report (06-18-25 @ 08:08):    Commensal marilee consistent with body site        RADIOLOGY & ADDITIONAL TESTS:  Personally reviewed.     Consultant(s) Notes Reviewed:  [x] YES  [ ] NO

## 2025-06-19 NOTE — PROGRESS NOTE ADULT - ASSESSMENT
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   Recently DC to [facility]    Readmitted with pneumonia, sepsis to MICU.   Transferred to medical unit 06/10/25  Hgb 7.8  received 1 unit PRBC    RECOMMENDATIONS    Anemia  follow CBC and transfuse as indicated    continues off AC  discussed with cardiology Dr. Jackson.  if stable to start eliquis 2.5mg po BID tomorrow  concern about risk of bleeding at 5mg po BID    d/w Dr CHRIS Pepe

## 2025-06-19 NOTE — PROGRESS NOTE ADULT - SUBJECTIVE AND OBJECTIVE BOX
Interval History:  remains weak  no further hemoptysis  received 1 unit PRBC today    Chart reviewed and events noted;   Overnight events:    MEDICATIONS  (STANDING):  albuterol/ipratropium for Nebulization 3 milliLiter(s) Nebulizer every 6 hours  aMIOdarone    Tablet 100 milliGRAM(s) Oral daily  atorvastatin 10 milliGRAM(s) Oral at bedtime  chlorhexidine 2% Cloths 1 Application(s) Topical <User Schedule>  dextrose 50% Injectable 25 Gram(s) IV Push once  dextrose 50% Injectable 12.5 Gram(s) IV Push once  dextrose 50% Injectable 25 Gram(s) IV Push once  fluticasone propionate/ salmeterol 500-50 MICROgram(s) Diskus 1 Dose(s) Inhalation two times a day  gabapentin 400 milliGRAM(s) Oral every 12 hours  insulin lispro (ADMELOG) corrective regimen sliding scale   SubCutaneous three times a day before meals  insulin lispro (ADMELOG) corrective regimen sliding scale   SubCutaneous at bedtime  pantoprazole  Injectable 40 milliGRAM(s) IV Push daily  predniSONE   Tablet 5 milliGRAM(s) Oral daily  torsemide 20 milliGRAM(s) Oral daily  torsemide 20 milliGRAM(s) Oral once    MEDICATIONS  (PRN):  acetaminophen     Tablet .. 650 milliGRAM(s) Oral every 6 hours PRN Mild Pain (1 - 3)  dextrose Oral Gel 15 Gram(s) Oral once PRN Blood Glucose LESS THAN 70 milliGRAM(s)/deciliter      Vital Signs Last 24 Hrs  T(C): 36.7 (19 Jun 2025 11:24), Max: 36.9 (19 Jun 2025 04:58)  T(F): 98.1 (19 Jun 2025 11:24), Max: 98.5 (19 Jun 2025 04:58)  HR: 82 (19 Jun 2025 11:24) (82 - 93)  BP: 121/69 (19 Jun 2025 11:24) (121/69 - 136/67)  BP(mean): --  RR: 20 (19 Jun 2025 11:24) (18 - 20)  SpO2: 97% (19 Jun 2025 11:24) (91% - 97%)    Parameters below as of 19 Jun 2025 11:24  Patient On (Oxygen Delivery Method): nasal cannula  O2 Flow (L/min): 5      PHYSICAL EXAM  General: adult in NAD  HEENT: clear oropharynx, anicteric sclera, pink conjunctivae  Neck: supple  CV: normal S1S2 with no murmur rubs or gallops  Lungs: clear to auscultation, no wheezes, no rhales  Abdomen: soft non-tender non-distended, no hepato/splenomegaly  Ext: no clubbing cyanosis or edema  Skin: no rashes and no petichiae  Neuro: alert and oriented X3 no focal deficits      LABS:  CBC Full  -  ( 19 Jun 2025 06:55 )  WBC Count : 9.83 K/uL  RBC Count : 2.46 M/uL  Hemoglobin : 7.8 g/dL  Hematocrit : 23.8 %  Platelet Count - Automated : 268 K/uL  Mean Cell Volume : 96.7 fl  Mean Cell Hemoglobin : 31.7 pg  Mean Cell Hemoglobin Concentration : 32.8 g/dL  Auto Neutrophil # : x  Auto Lymphocyte # : x  Auto Monocyte # : x  Auto Eosinophil # : x  Auto Basophil # : x  Auto Neutrophil % : x  Auto Lymphocyte % : x  Auto Monocyte % : x  Auto Eosinophil % : x  Auto Basophil % : x    06-19    144  |  105  |  30[H]  ----------------------------<  115[H]  3.7   |  33[H]  |  0.94    Ca    8.5      19 Jun 2025 06:55  Phos  2.6     06-19  Mg     1.9     06-19    TPro  5.5[L]  /  Alb  2.7[L]  /  TBili  0.7  /  DBili  x   /  AST  13[L]  /  ALT  18  /  AlkPhos  38[L]  06-19    PT/INR - ( 18 Jun 2025 07:50 )   PT: 15.9 sec;   INR: 1.36 ratio             fe studies      WBC trend  9.83 K/uL (06-19-25 @ 06:55)  11.98 K/uL (06-18-25 @ 07:50)  9.69 K/uL (06-17-25 @ 09:25)      Hgb trend  7.8 g/dL (06-19-25 @ 06:55)  8.5 g/dL (06-18-25 @ 07:50)  8.1 g/dL (06-17-25 @ 09:25)      plt trend  268 K/uL (06-19-25 @ 06:55)  323 K/uL (06-18-25 @ 07:50)  330 K/uL (06-17-25 @ 09:25)        RADIOLOGY & ADDITIONAL STUDIES:

## 2025-06-19 NOTE — PROGRESS NOTE ADULT - PROBLEM SELECTOR PLAN 3
- The patient is noted to have blood-tinged sputum  - requiring PRBC transfusions ~8 weeks (via Rt subclavian mediport)  - Hgb 7.6 (baseline appears to be ~8)   - transfuse <7-7.5  - Will hold Eliquis until clinical improvement is noted  - 6/12 heparin gtt started  - 6/13 : heparin gtt now discontinued 2/2 hemoptysis  - S/p 2 unit PRBC- will give lasix 40mg IVP x1 in between units -- good response - The patient is noted to have blood-tinged sputum  - requiring PRBC transfusions ~8 weeks (via Rt subclavian mediport)  - Hgb 7.6 (baseline appears to be ~8)   - transfuse <7-7.5  - Will hold Eliquis until clinical improvement is noted  - 6/12 heparin gtt started  - 6/13 : heparin gtt now discontinued 2/2 hemoptysis  - S/p 2 unit PRBC- will give lasix 40mg IVP x1 in between units -- good response  - ordered 1 unit pRBC on 06/19

## 2025-06-19 NOTE — PROGRESS NOTE ADULT - PROBLEM SELECTOR PLAN 12
#Edema:  trace edema  -restarted home Torsemide 20 mg daily    #VTE ppx: - Hep gtt discontinued 2/2 hemoptysis   #GI ppx: iv ppi  #Diet: Diet, Regular: DASH/TLC {Sodium & Cholesterol Restricted} (06-08-25 @ 15:56) [Active]  #Activity: Activity - Increase As Tolerated:   Time/Priority:  Routine (06-08-25 @ 14:16) [Active]  #ACP: full code  #Dispo: further plans pending clinical course

## 2025-06-19 NOTE — PROGRESS NOTE ADULT - PROBLEM SELECTOR PLAN 1
Patient found to have hemoptysis  - Will discontinue heparin gtt  - Bronch yesterday   - Heme onc following  - ID following  - Pulm following post bronch - neg cult Patient found to have hemoptysis  - Will discontinue heparin gtt  - Bronch on 06/16  - Heme onc following  - ID following  - Pulm following post bronch - neg cult Patient found to have hemoptysis  - Will discontinue heparin gtt  - Bronch on 06/16  - Heme onc following  - ID following  - Pulm following post bronch - neg cult  - Hemoptysis is likely resolving

## 2025-06-19 NOTE — PROGRESS NOTE ADULT - PROBLEM SELECTOR PLAN 9
chronic rate controlled   monitor sputum production ( blood tinged today )   would benefit overall from therapeutic AC but given low h/h  c/w amiodarone 100mg qd.  - 6/12 heparin gtt started  - 6/13 : heparin gtt now discontinued 2/2 hemoptysis  - as hemoptysis is resolving and hgb increases beyond 9, will resume eliquis chronic rate controlled   monitor sputum production ( blood tinged today )   would benefit overall from therapeutic AC but given low h/h  c/w amiodarone 100mg qd.  - 6/12 heparin gtt started  - 6/13 : heparin gtt now discontinued 2/2 hemoptysis  - as hemoptysis is resolving and hgb increases beyond 9, will resume Eliquis chronic rate controlled   monitor sputum production ( blood tinged today )   would benefit overall from therapeutic AC but given low h/h  c/w amiodarone 100mg qd.  - 6/12 heparin gtt started  - 6/13 : heparin gtt now discontinued 2/2 hemoptysis  - as hemoptysis is resolving and IF hgb increases beyond 9, will resume Eliquis

## 2025-06-19 NOTE — PROGRESS NOTE ADULT - SUBJECTIVE AND OBJECTIVE BOX
[facility]                                                       Dr. Chisholm, Dr. Franklin, Dr. Baron, Dr. Clark, Dr. Stock                                      [facility], [facility], [address]                                                 [address] [place] [address]                                      Ph: [phone]  Fax: [phone]                                         Ph: [phone]  Fax: [phone]      Patient is a 84y old  Female who presents with a chief complaint of septic shock, PNA (09 Jun 2025 12:36)  Patient seen in follow up for KIMBERLY on CKD.        PAST MEDICAL HISTORY:  COPD (chronic obstructive pulmonary disease)    DM (diabetes mellitus)    Pulmonary fibrosis    PAF (paroxysmal atrial fibrillation)    Hiatal hernia    GIB (gastrointestinal bleeding)    Pericarditis    Shoulder fracture, right    Hematoma    H/O aplastic anemia    History of IBS    H/O osteoporosis    HLD (hyperlipidemia)    PMR (polymyalgia rheumatica)    History of basal cell cancer    Chronic kidney disease (CKD)    Hypotension    Presence of IVC filter    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    History of immunodeficiency    Lumbar compression fracture    H/O hiatal hernia    H/O pulmonary fibrosis    H/O sinus bradycardia    History of fracture of right hip      MEDICATIONS  (STANDING):  albuterol/ipratropium for Nebulization 3 milliLiter(s) Nebulizer every 6 hours  aMIOdarone    Tablet 100 milliGRAM(s) Oral daily  atorvastatin 10 milliGRAM(s) Oral at bedtime  chlorhexidine 2% Cloths 1 Application(s) Topical <User Schedule>  dextrose 50% Injectable 25 Gram(s) IV Push once  dextrose 50% Injectable 12.5 Gram(s) IV Push once  dextrose 50% Injectable 25 Gram(s) IV Push once  fluticasone propionate/ salmeterol 500-50 MICROgram(s) Diskus 1 Dose(s) Inhalation two times a day  gabapentin 400 milliGRAM(s) Oral every 12 hours  insulin lispro (ADMELOG) corrective regimen sliding scale   SubCutaneous three times a day before meals  insulin lispro (ADMELOG) corrective regimen sliding scale   SubCutaneous at bedtime  pantoprazole  Injectable 40 milliGRAM(s) IV Push daily  predniSONE   Tablet 5 milliGRAM(s) Oral daily  torsemide 20 milliGRAM(s) Oral daily  torsemide 20 milliGRAM(s) Oral once    MEDICATIONS  (PRN):  acetaminophen     Tablet .. 650 milliGRAM(s) Oral every 6 hours PRN Mild Pain (1 - 3)  dextrose Oral Gel 15 Gram(s) Oral once PRN Blood Glucose LESS THAN 70 milliGRAM(s)/deciliter    T(C): 36.7 (06-19-25 @ 11:24), Max: 37.1 (06-18-25 @ 05:39)  HR: 89 (06-19-25 @ 13:30) (78 - 93)  BP: 121/69 (06-19-25 @ 11:24) (101/58 - 136/67)  RR: 20 (06-19-25 @ 11:24)  SpO2: 95% (06-19-25 @ 13:30)  Wt(kg): --  I&O's Detail    18 Jun 2025 07:01  -  19 Jun 2025 07:00  --------------------------------------------------------  IN:  Total IN: 0 mL    OUT:    Voided (mL): 1400 mL  Total OUT: 1400 mL    Total NET: -1400 mL      19 Jun 2025 07:01  -  19 Jun 2025 14:34  --------------------------------------------------------  IN:    Oral Fluid: 120 mL  Total IN: 120 mL    OUT:  Total OUT: 0 mL    Total NET: 120 mL                      PHYSICAL EXAM:  General: No distress  Respiratory: b/l air entry  Cardiovascular: S1 S2  Gastrointestinal: soft  Extremities:  edema        LABORATORY:                        7.8    9.83  )-----------( 268      ( 19 Jun 2025 06:55 )             23.8     06-19    144  |  105  |  30[H]  ----------------------------<  115[H]  3.7   |  33[H]  |  0.94    Ca    8.5      19 Jun 2025 06:55  Phos  2.6     06-19  Mg     1.9     06-19    TPro  5.5[L]  /  Alb  2.7[L]  /  TBili  0.7  /  DBili  x   /  AST  13[L]  /  ALT  18  /  AlkPhos  38[L]  06-19    Sodium: 144 mmol/L (06-19 @ 06:55)  Sodium: 144 mmol/L (06-18 @ 07:50)    Potassium: 3.7 mmol/L (06-19 @ 06:55)  Potassium: 3.8 mmol/L (06-18 @ 07:50)    Hemoglobin: 7.8 g/dL (06-19 @ 06:55)  Hemoglobin: 8.5 g/dL (06-18 @ 07:50)  Hemoglobin: 8.1 g/dL (06-17 @ 09:25)    Creatinine, Serum 0.94 (06-19 @ 06:55)  Creatinine, Serum 0.97 (06-18 @ 07:50)  Creatinine, Serum 1.20 (06-17 @ 09:25)    CARDIAC MARKERS ( 19 Jun 2025 06:55 )  x     / x     / x     / x     / <1.0 ng/mL      LIVER FUNCTIONS - ( 19 Jun 2025 06:55 )  Alb: 2.7 g/dL / Pro: 5.5 g/dL / ALK PHOS: 38 U/L / ALT: 18 U/L / AST: 13 U/L / GGT: x           Urinalysis Basic - ( 19 Jun 2025 06:55 )    Color: x / Appearance: x / SG: x / pH: x  Gluc: 115 mg/dL / Ketone: x  / Bili: x / Urobili: x   Blood: x / Protein: x / Nitrite: x   Leuk Esterase: x / RBC: x / WBC x   Sq Epi: x / Non Sq Epi: x / Bacteria: x

## 2025-06-19 NOTE — PROGRESS NOTE ADULT - ASSESSMENT
84F h/o AFib on Eliquis, COPD, CKD, aplastic anemia, polymyalgia rheumatica on chronic steroids, avascular necrosis of hips, HTN, HLD, with recent admission to [place] 5/26 - 6/5 for acute HFpEF exacerbation and COPD exacerbation, discharged to rehab on 6/5 and returning today with right sided chest pain, general malaise and feeling unwell. She reports some slight cough though otherwise no other new symptoms reported. Found to be hypotensive, febrile w/ leukocytosis. Admitted to ICU for septic shock likely 2/2 to PNA and KIMBERLY. Now stable for downgrade to tele.

## 2025-06-19 NOTE — PROGRESS NOTE ADULT - ASSESSMENT
84 female PMH of A-fib on Eliquis, COPD, CKD, aplastic anemia, polymyalgia rheumatica, on chronic steroids, avascular necrosis of hips, HLD, HTN, DM, recent admission to [place] 5/26 through 6/5/2025 for CHF/fluid overload/COPD exacerbation, presents to the ED form [facility] for evaluation of fever and generalized feeling of being unwell, found to be septic and hypotensive.     Septic shock likely 2/2 to PNA and KIMBERLY.  - s/p ICU and pressor  - CT chest 6/10 with multifocal R sided PNA  - Pulm following.  Initiate incentive spirometer  - s/p bronch, 6/16.  Showed blood oozing from RUL but no lesion.  Still with minimal hemptysis  - On NC at 4L/min, satting well.  Downtitrate to keep Sat 92%    - c/o atypical CP overnight and this morning.  Appears pleuritic  - EKG x 2 showed NSR with PAC, non-ischemic.  Troponin negative   - Back on home statin for Hx HLD    - No significant sign fluid overload on exam but with trace edema  - Back on home Torsemide 20 mg daily.  Would give extra dose tonight  - Echo 6/12/25 as above EF 67% mild LVH, mod MAC, mild MR, mod to severe pHTN    - Bp remains stable    - Now off Midodrine and Northera    - Hx of paroxysmal atrial fibrillation and DVT/PE  - Continue to hold home Eliquis/heparin gtt for persistent hemoptysis (from Afib standpoint)  - Resume Heparin gtt when cleared by Heme  - Continue Amiodarone 100 mg daily    - Monitor and replete electrolytes. Keep K>4.0 and Mg>2.0.  - Will continue to follow    Shaneka Rogers DNP, NP-C, AGACNP-C  Cardiology   Call TEAMS

## 2025-06-19 NOTE — PHARMACOTHERAPY INTERVENTION NOTE - COMMENTS
Admission counseling - discussed current and new medications with the patient at bedside including indications, directions, and adverse effects to monitor. Patient verbalized understanding and had no further questions.
85 yo F ordered for azithromycin 500 mg q 24 for PNA. Recommended discontinuation of azithromycin as legionella test resulted as negative. Discussed with resident and order d/c.

## 2025-06-19 NOTE — PROGRESS NOTE ADULT - SUBJECTIVE AND OBJECTIVE BOX
[facility] -- Sai Valle, Manuel, El Stout, , Rene Hernandez  Office # [phone]    Follow Up:  Hypotension, PAfib    Subjective/Observations: Sitting on the chair.  Still on NC at 4L/min.  States, breathing is unchanged.  Still with hemoptysis, though, less now and brownish reddish in color.  c/o non-radiating MSCP overnight that was worse with deep-breathing.  Still with same pain during evaluation    REVIEW OF SYSTEMS: All other review of systems is negative unless indicated above  PAST MEDICAL & SURGICAL HISTORY:  COPD (chronic obstructive pulmonary disease)      DM (diabetes mellitus)  diet-controlled      PAF (paroxysmal atrial fibrillation)  s/p ablation      Hiatal hernia      H/O aplastic anemia      History of IBS      H/O osteoporosis      HLD (hyperlipidemia)      PMR (polymyalgia rheumatica)      History of basal cell cancer      Chronic kidney disease (CKD)      Hypotension      Presence of IVC filter      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      History of immunodeficiency      Lumbar compression fracture      H/O hiatal hernia      H/O pulmonary fibrosis      H/O sinus bradycardia      History of fracture of right hip  "unoperable"      S/P hysterectomy      S/P foot surgery      S/P knee surgery      After cataract  bilateral eye cataract surgically removed      Closed right hip fracture  rigth hip ORIF july 19 2016      S/P IVC filter  nov 2016      S/P carpal tunnel release  Right 2008 & 6/27/17      H/O kyphoplasty      Port-A-Cath in place  right chest        MEDICATIONS  (STANDING):  albuterol/ipratropium for Nebulization 3 milliLiter(s) Nebulizer every 6 hours  aMIOdarone    Tablet 100 milliGRAM(s) Oral daily  atorvastatin 10 milliGRAM(s) Oral at bedtime  chlorhexidine 2% Cloths 1 Application(s) Topical <User Schedule>  dextrose 50% Injectable 25 Gram(s) IV Push once  dextrose 50% Injectable 12.5 Gram(s) IV Push once  dextrose 50% Injectable 25 Gram(s) IV Push once  fluticasone propionate/ salmeterol 500-50 MICROgram(s) Diskus 1 Dose(s) Inhalation two times a day  gabapentin 400 milliGRAM(s) Oral every 12 hours  insulin lispro (ADMELOG) corrective regimen sliding scale   SubCutaneous three times a day before meals  insulin lispro (ADMELOG) corrective regimen sliding scale   SubCutaneous at bedtime  pantoprazole  Injectable 40 milliGRAM(s) IV Push daily  predniSONE   Tablet 5 milliGRAM(s) Oral daily  torsemide 20 milliGRAM(s) Oral daily    MEDICATIONS  (PRN):  acetaminophen     Tablet .. 650 milliGRAM(s) Oral every 6 hours PRN Mild Pain (1 - 3)  dextrose Oral Gel 15 Gram(s) Oral once PRN Blood Glucose LESS THAN 70 milliGRAM(s)/deciliter    Allergies    No Known Allergies    Intolerances      Vital Signs Last 24 Hrs  T(C): 36.9 (19 Jun 2025 04:58), Max: 37.1 (18 Jun 2025 11:40)  T(F): 98.5 (19 Jun 2025 04:58), Max: 98.7 (18 Jun 2025 11:40)  HR: 87 (19 Jun 2025 07:25) (82 - 93)  BP: 136/67 (19 Jun 2025 04:58) (101/58 - 136/67)  BP(mean): --  RR: 18 (19 Jun 2025 04:58) (18 - 20)  SpO2: 95% (19 Jun 2025 07:25) (91% - 100%)    Parameters below as of 19 Jun 2025 07:25  Patient On (Oxygen Delivery Method): nasal cannula      I&O's Summary    18 Jun 2025 07:01  -  19 Jun 2025 07:00  --------------------------------------------------------  IN: 0 mL / OUT: 1400 mL / NET: -1400 mL    PHYSICAL EXAM:  TELE: Not on tele  Constitutional: NAD, awake and alert  HEENT: Moist Mucous Membranes, Anicteric  Pulmonary: Non-labored, breath sounds are diminished bilaterally, No wheezing, rales or rhonchi  Cardiovascular: IRRR, S1 and S2, +murmurs, no rubs, gallops or clicks  Gastrointestinal: Bowel Sounds present, soft, nontender.   Lymph: 1+ peripheral edema. No lymphadenopathy.  Skin: No visible rashes or ulcers. +LUE ecchymoses  Psych:  Mood & affect appropriate    LABS: All Labs Reviewed:                        7.8    9.83  )-----------( 268      ( 19 Jun 2025 06:55 )             23.8                         8.5    11.98 )-----------( 323      ( 18 Jun 2025 07:50 )             26.2                         8.1    9.69  )-----------( 330      ( 17 Jun 2025 09:25 )             25.4     19 Jun 2025 06:55    144    |  105    |  30     ----------------------------<  115    3.7     |  33     |  0.94   18 Jun 2025 07:50    144    |  104    |  29     ----------------------------<  129    3.8     |  34     |  0.97   17 Jun 2025 09:25    142    |  106    |  35     ----------------------------<  142    4.3     |  30     |  1.20     Ca    8.5        19 Jun 2025 06:55  Ca    9.0        18 Jun 2025 07:50  Ca    8.5        17 Jun 2025 09:25  Phos  2.6       19 Jun 2025 06:55  Phos  2.3       18 Jun 2025 07:50  Phos  2.7       17 Jun 2025 09:25  Mg     1.9       19 Jun 2025 06:55  Mg     1.3       18 Jun 2025 07:50  Mg     1.7       17 Jun 2025 09:25    TPro  5.5    /  Alb  2.7    /  TBili  0.7    /  DBili  x      /  AST  13     /  ALT  18     /  AlkPhos  38     19 Jun 2025 06:55  TPro  6.8    /  Alb  3.0    /  TBili  1.0    /  DBili  x      /  AST  <3     /  ALT  23     /  AlkPhos  46     18 Jun 2025 07:50  TPro  6.4    /  Alb  2.8    /  TBili  0.7    /  DBili  x      /  AST  5      /  ALT  21     /  AlkPhos  47     17 Jun 2025 09:25    PT/INR - ( 18 Jun 2025 07:50 )   PT: 15.9 sec;   INR: 1.36 ratio           CARDIAC MARKERS ( 19 Jun 2025 06:55 )  x     / x     / x     / x     / <1.0 ng/mL      12 Lead ECG:   Ventricular Rate 88 BPM    Atrial Rate 88 BPM    P-R Interval 130 ms    QRS Duration 78 ms    Q-T Interval 370 ms    QTC Calculation(Bazett) 447 ms    P Axis 79 degrees    R Axis -48 degrees    T Axis 78 degrees    Diagnosis Line Sinus rhythm with premature atrial complexes  Left anterior fascicular block  Possible Lateral infarct , age undetermined (06-19-25 @ 04:43)      TRANSTHORACIC ECHOCARDIOGRAM REPORT  ________________________________________________________________________________                                      _______       Pt. Name:       MARTINEZ JONES Study Date:    6/12/2025  MRN:            [id number]        YOB: 1940  Accession #:    [id number]       Age:           84 years  Account#:       [id number]      Gender:        F  Heart Rate:                     Height:        62.99 in (160.00 cm)  Rhythm:                         Weight:        147.71 lb (67.00 kg)  Blood Pressure: 109/63 mmHg     BSA/BMI:       1.70 m² / 26.17 kg/m²  ________________________________________________________________________________________  Referring Physician:    [id number] Naresh Vitale  Interpreting Physician: Daniel Jorge M.D.  Primary Sonographer:    Jamal Hayes    CPT:               ECHO TTE WO CON COMP W DOPP - 33249.m  Indication(s):     Shock, unspecified - R57.9  Procedure:         Transthoracic echocardiogram with 2-D, M-mode and complete                     spectral and color flow Doppler.  Ordering Location: ICU1  Admission Status:  Inpatient  Study Information: Image quality for this study is technically difficult.    _______________________________________________________________________________________     CONCLUSIONS:      1. Technically difficult image quality.   2. Left ventricular systolic function is normal with an ejection fraction of 67 % by Arias's method of disks.   3. Mild left ventricular hypertrophy.   4.Normal right ventricular cavity size and probably normal right ventricular systolic function.   5. Tricuspid aortic valve with normal leaflet excursion. There is calcification of the aortic valve leaflets.   6. Moderate mitral valve leaflet calcification.   7. Mild mitral regurgitation.   8. Moderate tricuspid regurgitation.   9. The inferior vena cava is normal in size measuring 1.64 cm in diameter, (normal <2.1cm) with normal inspiratory collapse (normal >50%) consistent with normal right atrial pressure (~3, range 0-5mmHg).  10. Estimated pulmonary artery systolic pressure is 57 mmHg, consistent with moderate-to-severe pulmonary hypertension.  11. Trace pericardial effusion.  12. Right pleural effusion noted.  13. Compared to the transthoracic echocardiogram performed on 6/2/2025, there have been no significant interval changes.    ________________________________________________________________________________________  FINDINGS:     Left Ventricle:  Left ventricular systolic function is normal with a calculated ejection fraction of 67 % by the Arias's biplane method of disks. Mild left ventricular hypertrophy.     Right Ventricle:  The right ventricular cavity is normal in size and right ventricular systolic function is probablynormal. Tricuspid annular plane systolic excursion (TAPSE) is 1.9 cm (normal >=1.7 cm).     Left Atrium:  The left atrium is normal in size with an indexed volume of 29.06 ml/m².     Right Atrium:  The right atrium is normal in size with an indexed volume of 26.00 ml/m² and an indexed area of 9.41 cm²/m².     Interatrial Septum:  The interatrial septum appears intact.     Aortic Valve:  The aortic valve is tricuspid with normal leaflet excursion. There is calcification of the aortic valve leaflets. There is mild thickening of the aortic valve leaflets. There is trace aortic regurgitation.     Mitral Valve:  Structurally normal mitral valve with normal leaflet excursion. There is calcification of the mitral valve annulus. There is moderate leaflet calcification. There is mild mitral regurgitation.     Tricuspid Valve:  The tricuspid valve is structurally normal with normal leaflet excursion. There is moderate tricuspid regurgitation. Estimated pulmonary artery systolic pressure is 57 mmHg,consistent with moderate-to-severe pulmonary hypertension.     Pulmonic Valve:  The pulmonic valve was not well visualized. There is trace pulmonic regurgitation.     Aorta:  The aortic root at the sinuses of Valsalva is normal in size, measuring 3.30 cm (indexed 1.94 cm/m²). The ascending aorta is normal in size, measuring 3.00 cm (indexed 1.76 cm/m²). The aortic arch diameter is normal in size, measuring 2.4 cm (indexed 1.41 cm/m²).     Pericardium:  There is a trace pericardial effusion.     Pleura:  Right pleural effusion noted.     Systemic Veins:  The inferior vena cava is normal in size measuring 1.64 cm in diameter, (normal <2.1cm) with normal inspiratory collapse (normal >50%) consistent with normal right atrial pressure (~3, range 0-5mmHg).  ____________________________________________________________________  QUANTITATIVE DATA:  Left Ventricle Measurements: (Indexed to BSA)     IVSd (2D):   1.2 cm  LVPWd (2D):  1.1 cm  LVIDd (2D):  4.2 cm  LVIDs (2D):  2.4 cm  LV Mass:     169 g  99.5 g/m²  LV Vol d, MOD A2C: 38.9 ml 22.88 ml/m²  LV Vol d, MOD A4C: 40.9 ml 24.06 ml/m²  LV Vol d, MOD BP:  40.9 ml 24.04 ml/m²  LV Vol s, MOD A2C: 11.3 ml 6.65 ml/m²  LV Vol s, MOD A4C: 13.4 ml 7.88 ml/m²  LV Vol s, MOD BP:  13.4 ml 7.91 ml/m²  LVOT SV MOD BP:    27.4 ml  LV EF% MOD BP:     67 %     MV E Vmax:    1.54 m/s  MV A Vmax:    0.74 m/s  MV E/A:       2.08  e' lateral:   11.90 cm/s  e' medial:    8.81 cm/s  E/e' lateral: 12.94  E/e' medial:  17.48  E/e' Average: 14.87  MV DT:272 msec    Aorta Measurements: (Normal range) (Indexed to BSA)     Ao Root d     3.30 cm (2.7 - 3.3 cm) 1.94 cm/m²  Ao Asc d, 2D: 3.00  Ao Asc prox:  3.00 cm                1.76 cm/m²  Ao Arch:      2.4 cm            Left Atrium Measurements: (Indexed to BSA)  LA Diam 2D: 3.60 cm         Right Ventricle Measurements: Right Atrial Measurements:     TAPSE:            1.9 cm      RA Vol s, MOD A4C         44.2 ml  RV Base (RVID1):  2.9 cm      RA Vol s, MOD A4C i BSA   26.00 ml/m²  RV Mid (RVID2): 2.5 cm      RA Area s, MOD A4C        16.0 cm²  RV Major (RVID3): 6.4 cm      RA Area s, MOD A4C, i BSA 9.41 cm²/m²       LVOT / RVOT/ Qp/Qs Data: (Indexed to BSA)  LVOT Diameter,s: 2.20 cm  LVOT Area:       3.80 cm²    Mitral Valve Measurements:     MV E Vmax: 1.5 m/s         MR Vmax:          3.80 m/s  MV A Vmax: 0.7 m/s         MR Peak Gradient: 57.8 mmHg  MV E/A:    2.1       Tricuspid Valve Measurements:     TR Vmax:          3.7 m/s  TR Peak Gradient: 54.5 mmHg  RA Pressure:      3 mmHg  PASP:             57 mmHg    ________________________________________________________________________________________  Electronically signed on 6/13/2025 at 11:19:51 AM by Daniel Jorge M.D.         *** Final ***

## 2025-06-19 NOTE — PROGRESS NOTE ADULT - PROBLEM SELECTOR PLAN 2
In the ED, tmax of 101 w/ KIMBERLY on CKD with Cr 1.90 (baseline 1.2-1.6), Hypotensive with SBP 80's (pt on midodrine therapy, usual 's). also w/ leukocytosis of 32.33. Procalcitonin of 13.6, RVP neg. Pt treated in ICU for septic shock requiring pressors, downgraded from the ICU to telemetry on 6/10  - CT chest 6/10 with multifocal R sided PNA  - completed zosyn on 06/18; monitor off abx   - completed weaning steroids on 06/18,  c/w home prednisone   - quant (negative) and fungitell 52 (negative)  - MRSA/MSSA PCR negative  - trend WBC and fever curve, supplemental O2 prn to maintain SPO2 > 92% -- taper o2  - ID consulted Dr Juan A varma appreciated

## 2025-06-20 LAB
ALBUMIN SERPL ELPH-MCNC: 3.1 G/DL — LOW (ref 3.3–5)
ALP SERPL-CCNC: 54 U/L — SIGNIFICANT CHANGE UP (ref 40–120)
ALT FLD-CCNC: 17 U/L — SIGNIFICANT CHANGE UP (ref 12–78)
ANION GAP SERPL CALC-SCNC: 2 MMOL/L — LOW (ref 5–17)
AST SERPL-CCNC: 6 U/L — LOW (ref 15–37)
BASE EXCESS BLDA CALC-SCNC: 14.7 MMOL/L — HIGH (ref -2–3)
BILIRUB SERPL-MCNC: 0.8 MG/DL — SIGNIFICANT CHANGE UP (ref 0.2–1.2)
BLOOD GAS COMMENTS ARTERIAL: SIGNIFICANT CHANGE UP
BUN SERPL-MCNC: 32 MG/DL — HIGH (ref 7–23)
CALCIUM SERPL-MCNC: 9 MG/DL — SIGNIFICANT CHANGE UP (ref 8.5–10.1)
CHLORIDE SERPL-SCNC: 105 MMOL/L — SIGNIFICANT CHANGE UP (ref 96–108)
CO2 SERPL-SCNC: 37 MMOL/L — HIGH (ref 22–31)
CREAT SERPL-MCNC: 0.85 MG/DL — SIGNIFICANT CHANGE UP (ref 0.5–1.3)
EGFR: 68 ML/MIN/1.73M2 — SIGNIFICANT CHANGE UP
EGFR: 68 ML/MIN/1.73M2 — SIGNIFICANT CHANGE UP
GLUCOSE BLDC GLUCOMTR-MCNC: 110 MG/DL — HIGH (ref 70–99)
GLUCOSE BLDC GLUCOMTR-MCNC: 120 MG/DL — HIGH (ref 70–99)
GLUCOSE BLDC GLUCOMTR-MCNC: 126 MG/DL — HIGH (ref 70–99)
GLUCOSE BLDC GLUCOMTR-MCNC: 168 MG/DL — HIGH (ref 70–99)
GLUCOSE SERPL-MCNC: 129 MG/DL — HIGH (ref 70–99)
HCO3 BLDA-SCNC: 40 MMOL/L — HIGH (ref 21–28)
HCT VFR BLD CALC: 27.7 % — LOW (ref 34.5–45)
HGB BLD-MCNC: 9.2 G/DL — LOW (ref 11.5–15.5)
MAGNESIUM SERPL-MCNC: 1.4 MG/DL — LOW (ref 1.6–2.6)
MCHC RBC-ENTMCNC: 31.6 PG — SIGNIFICANT CHANGE UP (ref 27–34)
MCHC RBC-ENTMCNC: 33.2 G/DL — SIGNIFICANT CHANGE UP (ref 32–36)
MCV RBC AUTO: 95.2 FL — SIGNIFICANT CHANGE UP (ref 80–100)
NRBC # BLD AUTO: 0 K/UL — SIGNIFICANT CHANGE UP (ref 0–0)
NRBC # FLD: 0 K/UL — SIGNIFICANT CHANGE UP (ref 0–0)
NRBC BLD AUTO-RTO: 0 /100 WBCS — SIGNIFICANT CHANGE UP (ref 0–0)
PCO2 BLDA: 71 MMHG — CRITICAL HIGH (ref 32–35)
PH BLDA: 7.36 — SIGNIFICANT CHANGE UP (ref 7.35–7.45)
PHOSPHATE SERPL-MCNC: 2.9 MG/DL — SIGNIFICANT CHANGE UP (ref 2.5–4.5)
PLATELET # BLD AUTO: 277 K/UL — SIGNIFICANT CHANGE UP (ref 150–400)
PMV BLD: 10.3 FL — SIGNIFICANT CHANGE UP (ref 7–13)
PO2 BLDA: 65 MMHG — LOW (ref 83–108)
POTASSIUM SERPL-MCNC: 3.8 MMOL/L — SIGNIFICANT CHANGE UP (ref 3.5–5.3)
POTASSIUM SERPL-SCNC: 3.8 MMOL/L — SIGNIFICANT CHANGE UP (ref 3.5–5.3)
PROT SERPL-MCNC: 6.1 G/DL — SIGNIFICANT CHANGE UP (ref 6–8.3)
RBC # BLD: 2.91 M/UL — LOW (ref 3.8–5.2)
RBC # FLD: 21.2 % — HIGH (ref 10.3–14.5)
SAO2 % BLDA: 95.2 % — SIGNIFICANT CHANGE UP (ref 94–98)
SODIUM SERPL-SCNC: 144 MMOL/L — SIGNIFICANT CHANGE UP (ref 135–145)
WBC # BLD: 8.7 K/UL — SIGNIFICANT CHANGE UP (ref 3.8–10.5)
WBC # FLD AUTO: 8.7 K/UL — SIGNIFICANT CHANGE UP (ref 3.8–10.5)

## 2025-06-20 PROCEDURE — 99231 SBSQ HOSP IP/OBS SF/LOW 25: CPT

## 2025-06-20 PROCEDURE — G0545: CPT

## 2025-06-20 PROCEDURE — 99232 SBSQ HOSP IP/OBS MODERATE 35: CPT

## 2025-06-20 PROCEDURE — 99233 SBSQ HOSP IP/OBS HIGH 50: CPT | Mod: GC

## 2025-06-20 RX ORDER — ESOMEPRAZOLE MAGNESIUM 40 MG/1
1 CAPSULE, DELAYED RELEASE ORAL
Refills: 0 | DISCHARGE

## 2025-06-20 RX ORDER — MAGNESIUM SULFATE 500 MG/ML
2 SYRINGE (ML) INJECTION ONCE
Refills: 0 | Status: COMPLETED | OUTPATIENT
Start: 2025-06-20 | End: 2025-06-20

## 2025-06-20 RX ORDER — ATORVASTATIN CALCIUM 80 MG/1
1 TABLET, FILM COATED ORAL
Qty: 0 | Refills: 0 | DISCHARGE
Start: 2025-06-20

## 2025-06-20 RX ORDER — APIXABAN 2.5 MG/1
2.5 TABLET, FILM COATED ORAL
Refills: 0 | Status: DISCONTINUED | OUTPATIENT
Start: 2025-06-20 | End: 2025-06-22

## 2025-06-20 RX ADMIN — GABAPENTIN 400 MILLIGRAM(S): 400 CAPSULE ORAL at 05:51

## 2025-06-20 RX ADMIN — APIXABAN 2.5 MILLIGRAM(S): 2.5 TABLET, FILM COATED ORAL at 09:30

## 2025-06-20 RX ADMIN — IPRATROPIUM BROMIDE AND ALBUTEROL SULFATE 3 MILLILITER(S): .5; 2.5 SOLUTION RESPIRATORY (INHALATION) at 00:52

## 2025-06-20 RX ADMIN — Medication 1 DOSE(S): at 21:33

## 2025-06-20 RX ADMIN — Medication 1 DOSE(S): at 08:08

## 2025-06-20 RX ADMIN — APIXABAN 2.5 MILLIGRAM(S): 2.5 TABLET, FILM COATED ORAL at 21:33

## 2025-06-20 RX ADMIN — AMIODARONE HYDROCHLORIDE 100 MILLIGRAM(S): 50 INJECTION, SOLUTION INTRAVENOUS at 05:51

## 2025-06-20 RX ADMIN — INSULIN LISPRO 1: 100 INJECTION, SOLUTION INTRAVENOUS; SUBCUTANEOUS at 17:11

## 2025-06-20 RX ADMIN — IPRATROPIUM BROMIDE AND ALBUTEROL SULFATE 3 MILLILITER(S): .5; 2.5 SOLUTION RESPIRATORY (INHALATION) at 07:24

## 2025-06-20 RX ADMIN — TORSEMIDE 20 MILLIGRAM(S): 20 TABLET ORAL at 05:51

## 2025-06-20 RX ADMIN — Medication 25 GRAM(S): at 09:30

## 2025-06-20 RX ADMIN — IPRATROPIUM BROMIDE AND ALBUTEROL SULFATE 3 MILLILITER(S): .5; 2.5 SOLUTION RESPIRATORY (INHALATION) at 13:53

## 2025-06-20 RX ADMIN — Medication 1 APPLICATION(S): at 05:51

## 2025-06-20 RX ADMIN — Medication 40 MILLIGRAM(S): at 11:25

## 2025-06-20 RX ADMIN — IPRATROPIUM BROMIDE AND ALBUTEROL SULFATE 3 MILLILITER(S): .5; 2.5 SOLUTION RESPIRATORY (INHALATION) at 20:15

## 2025-06-20 RX ADMIN — ATORVASTATIN CALCIUM 10 MILLIGRAM(S): 80 TABLET, FILM COATED ORAL at 21:33

## 2025-06-20 RX ADMIN — GABAPENTIN 400 MILLIGRAM(S): 400 CAPSULE ORAL at 17:11

## 2025-06-20 RX ADMIN — PREDNISONE 5 MILLIGRAM(S): 20 TABLET ORAL at 05:51

## 2025-06-20 NOTE — PROGRESS NOTE ADULT - ASSESSMENT
84 female PMH of A-fib on Eliquis, COPD, CKD, aplastic anemia, polymyalgia rheumatica, on chronic steroids, avascular necrosis of hips, HLD, HTN, DM, recent admission to [place] 5/26 through 6/5/2025 for CHF/fluid overload/COPD exacerbation, presents to the ED form [facility] for evaluation of fever and generalized feeling of being unwell, found to be septic and hypotensive.     Septic shock likely 2/2 to PNA and KIMBERLY.  - s/p ICU and pressor  - CT chest 6/10 with multifocal R sided PNA  - Pulm following.  Initiate incentive spirometer  - s/p bronch, 6/16.  Showed blood oozing from RUL but no lesion.  Still with minimal hemptysis  - On NC at 4L/min, satting well.  Downtitrate to keep Sat 92%    - c/o atypical CP Appears pleuritic  - EKG x 2 showed NSR with PAC, non-ischemic.  Troponin negative   - Back on home statin for Hx HLD    - No significant sign fluid overload on exam but with trace edema  - Back on home Torsemide 20 mg daily.  Would give extra dose tonight  - Echo 6/12/25 as above EF 67% mild LVH, mod MAC, mild MR, mod to severe pHTN    - Bp remains stable    - Now off Midodrine and Northera    - Hx of paroxysmal atrial fibrillation and DVT/PE  - Continue to hold home Eliquis/heparin gtt for persistent hemoptysis (from Afib standpoint)  - Resume Heparin gtt when cleared by Heme, fu am CBC   - Continue Amiodarone 100 mg daily    - Monitor and replete electrolytes. Keep K>4.0 and Mg>2.0.  - Will continue to follow 84 female PMH of A-fib on Eliquis, COPD, CKD, aplastic anemia, polymyalgia rheumatica, on chronic steroids, avascular necrosis of hips, HLD, HTN, DM, recent admission to [place] 5/26 through 6/5/2025 for CHF/fluid overload/COPD exacerbation, presents to the ED form [facility] for evaluation of fever and generalized feeling of being unwell, found to be septic and hypotensive.     Septic shock likely 2/2 to PNA and KIMBERLY.  - s/p ICU and pressor  - CT chest 6/10 with multifocal R sided PNA  - Pulm following.  Initiate incentive spirometer  - s/p bronch, 6/16.  Showed blood oozing from RUL but no lesion.  Still with minimal hemptysis  - On NC at 4L/min, satting well.  Downtitrate to keep Sat 92%    - c/o atypical CP Appears pleuritic  - EKG x 2 showed NSR with PAC, non-ischemic.  Troponin negative   - Back on home statin for Hx HLD    - No significant sign fluid overload on exam but with trace edema  - Back on home Torsemide 20 mg daily.  Would give extra dose tonight  - Echo 6/12/25 as above EF 67% mild LVH, mod MAC, mild MR, mod to severe pHTN    - Bp remains stable    - Now off Midodrine and Northera    - Hx of paroxysmal atrial fibrillation and DVT/PE  - Continue to hold home Eliquis/heparin gtt for persistent hemoptysis (from Afib standpoint)  - Resume Heparin gtt when cleared by Heme, remains anemic fu am CBC   - Continue Amiodarone 100 mg daily    - Monitor and replete electrolytes. Keep K>4.0 and Mg>2.0.  - Will continue to follow

## 2025-06-20 NOTE — PROGRESS NOTE ADULT - SUBJECTIVE AND OBJECTIVE BOX
Patient is a 84y old  Female who presents with a chief complaint of septic shock, PNA (20 Jun 2025 12:18)      INTERVAL HPI/OVERNIGHT EVENTS:     T(C): 36.8 (06-20-25 @ 11:29), Max: 37.1 (06-19-25 @ 20:08)  HR: 84 (06-20-25 @ 11:29) (84 - 93)  BP: 127/71 (06-20-25 @ 11:29) (117/69 - 130/69)  RR: 18 (06-20-25 @ 11:29) (18 - 18)  SpO2: 96% (06-20-25 @ 11:29) (95% - 96%)  Wt(kg): --  I&O's Summary    19 Jun 2025 07:01  -  20 Jun 2025 07:00  --------------------------------------------------------  IN: 120 mL / OUT: 1400 mL / NET: -1280 mL        REVIEW OF SYSTEMS:  CONSTITUTIONAL: No fever, weight loss, or fatigue  EYES: No eye pain, visual disturbances, or discharge  ENMT:  No difficulty hearing, tinnitus, vertigo; No sinus or throat pain  NECK: No pain or stiffness  BREASTS: No pain, no masses  RESPIRATORY: No cough, wheezing, chills or hemoptysis; No shortness of breath  CARDIOVASCULAR: No chest pain, palpitations, dizziness, or leg swelling  GASTROINTESTINAL: No abdominal or epigastric pain. No nausea, vomiting, or hematemesis; No diarrhea or constipation. No melena or hematochezia.  GENITOURINARY: No dysuria, frequency, hematuria, or incontinence  NEUROLOGICAL: No headaches, memory loss, loss of strength, numbness, or tremors  SKIN: No itching, burning, rashes, or lesions   MUSCULOSKELETAL: No joint pain or swelling; No muscle, back, or extremity pain    PHYSICAL EXAM:  GENERAL: NAD, well-groomed, well-developed  HEAD:  Atraumatic, Normocephalic  EYES: EOMI, PERRLA, conjunctiva and sclera clear  ENMT: No tonsillar erythema, exudates, or enlargement; Moist mucous membranes  NECK: Supple, No JVD  NERVOUS SYSTEM:  Alert & Oriented X3; Motor Strength 5/5 B/L upper and lower extremities; DTRs 2+ intact and symmetric  CHEST/LUNG: Clear to percussion bilaterally; No rales, rhonchi, wheezing, or rubs  HEART: Regular rate and rhythm; No murmurs, rubs, or gallops  ABDOMEN: Soft, Nontender, Nondistended; Bowel sounds present  EXTREMITIES:  2+ Peripheral Pulses, No clubbing, cyanosis, or edema  SKIN: No rashes or lesions    MEDICATIONS  (STANDING):  albuterol/ipratropium for Nebulization 3 milliLiter(s) Nebulizer every 6 hours  aMIOdarone    Tablet 100 milliGRAM(s) Oral daily  apixaban 2.5 milliGRAM(s) Oral two times a day  atorvastatin 10 milliGRAM(s) Oral at bedtime  chlorhexidine 2% Cloths 1 Application(s) Topical <User Schedule>  dextrose 50% Injectable 25 Gram(s) IV Push once  dextrose 50% Injectable 12.5 Gram(s) IV Push once  dextrose 50% Injectable 25 Gram(s) IV Push once  fluticasone propionate/ salmeterol 500-50 MICROgram(s) Diskus 1 Dose(s) Inhalation two times a day  gabapentin 400 milliGRAM(s) Oral every 12 hours  insulin lispro (ADMELOG) corrective regimen sliding scale   SubCutaneous three times a day before meals  insulin lispro (ADMELOG) corrective regimen sliding scale   SubCutaneous at bedtime  pantoprazole  Injectable 40 milliGRAM(s) IV Push daily  predniSONE   Tablet 5 milliGRAM(s) Oral daily  torsemide 20 milliGRAM(s) Oral daily    MEDICATIONS  (PRN):  acetaminophen     Tablet .. 650 milliGRAM(s) Oral every 6 hours PRN Mild Pain (1 - 3)  dextrose Oral Gel 15 Gram(s) Oral once PRN Blood Glucose LESS THAN 70 milliGRAM(s)/deciliter      LABS:                        9.2    8.70  )-----------( 277      ( 20 Jun 2025 08:15 )             27.7     06-20    144  |  105  |  32[H]  ----------------------------<  129[H]  3.8   |  37[H]  |  0.85    Ca    9.0      20 Jun 2025 08:15  Phos  2.9     06-20  Mg     1.4     06-20    TPro  6.1  /  Alb  3.1[L]  /  TBili  0.8  /  DBili  x   /  AST  6[L]  /  ALT  17  /  AlkPhos  54  06-20      Urinalysis Basic - ( 20 Jun 2025 08:15 )    Color: x / Appearance: x / SG: x / pH: x  Gluc: 129 mg/dL / Ketone: x  / Bili: x / Urobili: x   Blood: x / Protein: x / Nitrite: x   Leuk Esterase: x / RBC: x / WBC x   Sq Epi: x / Non Sq Epi: x / Bacteria: x      CAPILLARY BLOOD GLUCOSE      POCT Blood Glucose.: 120 mg/dL (20 Jun 2025 12:08)  POCT Blood Glucose.: 126 mg/dL (20 Jun 2025 08:01)  POCT Blood Glucose.: 122 mg/dL (19 Jun 2025 21:44)  POCT Blood Glucose.: 124 mg/dL (19 Jun 2025 17:07)      06-16 @ 13:09   Commensal marilee consistent with body site  --  --          RADIOLOGY & ADDITIONAL TESTS:    Imaging Personally Reviewed:       Advance Directives:      Palliative Care:  Appropriate     Patient is a 84y old  Female who presents with a chief complaint of septic shock, PNA (20 Jun 2025 12:18)      INTERVAL HPI/OVERNIGHT EVENTS: s/p 1 unit of blood yesterday, received extra dose of torsemide last night. bicarb increasing to 37. hemoptysis has resolved-- she is coughing old residual brown tinged mucous. started eliquis 2.5mg BID for now, will monitor for any bleed. denies any acute concerns.    T(C): 36.8 (06-20-25 @ 11:29), Max: 37.1 (06-19-25 @ 20:08)  HR: 84 (06-20-25 @ 11:29) (84 - 93)  BP: 127/71 (06-20-25 @ 11:29) (117/69 - 130/69)  RR: 18 (06-20-25 @ 11:29) (18 - 18)  SpO2: 96% (06-20-25 @ 11:29) (95% - 96%)  Wt(kg): --  I&O's Summary    19 Jun 2025 07:01  -  20 Jun 2025 07:00  --------------------------------------------------------  IN: 120 mL / OUT: 1400 mL / NET: -1280 mL        REVIEW OF SYSTEMS: denies other than mentioned in HPI    PHYSICAL EXAM:  GENERAL: NAD  HEENT:  anicteric, moist mucous membranes  CHEST/LUNG:  CTA b/l, no rales, wheezes, or rhonchi  HEART:  RRR, S1, S2  ABDOMEN:  BS+, soft, nontender, nondistended  EXTREMITIES: no edema, cyanosis, or calf tenderness  NERVOUS SYSTEM: answers questions and follows commands appropriately    MEDICATIONS  (STANDING):  albuterol/ipratropium for Nebulization 3 milliLiter(s) Nebulizer every 6 hours  aMIOdarone    Tablet 100 milliGRAM(s) Oral daily  apixaban 2.5 milliGRAM(s) Oral two times a day  atorvastatin 10 milliGRAM(s) Oral at bedtime  chlorhexidine 2% Cloths 1 Application(s) Topical <User Schedule>  dextrose 50% Injectable 25 Gram(s) IV Push once  dextrose 50% Injectable 12.5 Gram(s) IV Push once  dextrose 50% Injectable 25 Gram(s) IV Push once  fluticasone propionate/ salmeterol 500-50 MICROgram(s) Diskus 1 Dose(s) Inhalation two times a day  gabapentin 400 milliGRAM(s) Oral every 12 hours  insulin lispro (ADMELOG) corrective regimen sliding scale   SubCutaneous three times a day before meals  insulin lispro (ADMELOG) corrective regimen sliding scale   SubCutaneous at bedtime  pantoprazole  Injectable 40 milliGRAM(s) IV Push daily  predniSONE   Tablet 5 milliGRAM(s) Oral daily  torsemide 20 milliGRAM(s) Oral daily    MEDICATIONS  (PRN):  acetaminophen     Tablet .. 650 milliGRAM(s) Oral every 6 hours PRN Mild Pain (1 - 3)  dextrose Oral Gel 15 Gram(s) Oral once PRN Blood Glucose LESS THAN 70 milliGRAM(s)/deciliter      LABS:                        9.2    8.70  )-----------( 277      ( 20 Jun 2025 08:15 )             27.7     06-20    144  |  105  |  32[H]  ----------------------------<  129[H]  3.8   |  37[H]  |  0.85    Ca    9.0      20 Jun 2025 08:15  Phos  2.9     06-20  Mg     1.4     06-20    TPro  6.1  /  Alb  3.1[L]  /  TBili  0.8  /  DBili  x   /  AST  6[L]  /  ALT  17  /  AlkPhos  54  06-20      Urinalysis Basic - ( 20 Jun 2025 08:15 )    Color: x / Appearance: x / SG: x / pH: x  Gluc: 129 mg/dL / Ketone: x  / Bili: x / Urobili: x   Blood: x / Protein: x / Nitrite: x   Leuk Esterase: x / RBC: x / WBC x   Sq Epi: x / Non Sq Epi: x / Bacteria: x      CAPILLARY BLOOD GLUCOSE      POCT Blood Glucose.: 120 mg/dL (20 Jun 2025 12:08)  POCT Blood Glucose.: 126 mg/dL (20 Jun 2025 08:01)  POCT Blood Glucose.: 122 mg/dL (19 Jun 2025 21:44)  POCT Blood Glucose.: 124 mg/dL (19 Jun 2025 17:07)      06-16 @ 13:09   Commensal marilee consistent with body site  --  --          RADIOLOGY & ADDITIONAL TESTS:    Imaging Personally Reviewed:       Advance Directives:      Palliative Care:  Appropriate     Patient is a 84y old  Female who presents with a chief complaint of septic shock, PNA (20 Jun 2025 12:18)      INTERVAL HPI/OVERNIGHT EVENTS: s/p 1 unit of blood yesterday, received extra dose of torsemide last night. bicarb increasing to 37. hemoptysis has resolved-- she is coughing old residual brown tinged mucous. started eliquis 2.5mg BID for now, will monitor for any bleed. denies any acute concerns.    T(C): 36.8 (06-20-25 @ 11:29), Max: 37.1 (06-19-25 @ 20:08)  HR: 84 (06-20-25 @ 11:29) (84 - 93)  BP: 127/71 (06-20-25 @ 11:29) (117/69 - 130/69)  RR: 18 (06-20-25 @ 11:29) (18 - 18)  SpO2: 96% (06-20-25 @ 11:29) (95% - 96%)  Wt(kg): --  I&O's Summary    19 Jun 2025 07:01  -  20 Jun 2025 07:00  --------------------------------------------------------  IN: 120 mL / OUT: 1400 mL / NET: -1280 mL        REVIEW OF SYSTEMS: denies other than mentioned in HPI    PHYSICAL EXAM:  GENERAL: NAD  HEENT:  anicteric, moist mucous membranes  CHEST/LUNG:  rhonchi notable b/l   HEART:  RRR, S1, S2  ABDOMEN:  BS+, soft, nontender, nondistended  EXTREMITIES: no edema, cyanosis, or calf tenderness  NERVOUS SYSTEM: answers questions and follows commands appropriately    MEDICATIONS  (STANDING):  albuterol/ipratropium for Nebulization 3 milliLiter(s) Nebulizer every 6 hours  aMIOdarone    Tablet 100 milliGRAM(s) Oral daily  apixaban 2.5 milliGRAM(s) Oral two times a day  atorvastatin 10 milliGRAM(s) Oral at bedtime  chlorhexidine 2% Cloths 1 Application(s) Topical <User Schedule>  dextrose 50% Injectable 25 Gram(s) IV Push once  dextrose 50% Injectable 12.5 Gram(s) IV Push once  dextrose 50% Injectable 25 Gram(s) IV Push once  fluticasone propionate/ salmeterol 500-50 MICROgram(s) Diskus 1 Dose(s) Inhalation two times a day  gabapentin 400 milliGRAM(s) Oral every 12 hours  insulin lispro (ADMELOG) corrective regimen sliding scale   SubCutaneous three times a day before meals  insulin lispro (ADMELOG) corrective regimen sliding scale   SubCutaneous at bedtime  pantoprazole  Injectable 40 milliGRAM(s) IV Push daily  predniSONE   Tablet 5 milliGRAM(s) Oral daily  torsemide 20 milliGRAM(s) Oral daily    MEDICATIONS  (PRN):  acetaminophen     Tablet .. 650 milliGRAM(s) Oral every 6 hours PRN Mild Pain (1 - 3)  dextrose Oral Gel 15 Gram(s) Oral once PRN Blood Glucose LESS THAN 70 milliGRAM(s)/deciliter      LABS:                        9.2    8.70  )-----------( 277      ( 20 Jun 2025 08:15 )             27.7     06-20    144  |  105  |  32[H]  ----------------------------<  129[H]  3.8   |  37[H]  |  0.85    Ca    9.0      20 Jun 2025 08:15  Phos  2.9     06-20  Mg     1.4     06-20    TPro  6.1  /  Alb  3.1[L]  /  TBili  0.8  /  DBili  x   /  AST  6[L]  /  ALT  17  /  AlkPhos  54  06-20      Urinalysis Basic - ( 20 Jun 2025 08:15 )    Color: x / Appearance: x / SG: x / pH: x  Gluc: 129 mg/dL / Ketone: x  / Bili: x / Urobili: x   Blood: x / Protein: x / Nitrite: x   Leuk Esterase: x / RBC: x / WBC x   Sq Epi: x / Non Sq Epi: x / Bacteria: x      CAPILLARY BLOOD GLUCOSE      POCT Blood Glucose.: 120 mg/dL (20 Jun 2025 12:08)  POCT Blood Glucose.: 126 mg/dL (20 Jun 2025 08:01)  POCT Blood Glucose.: 122 mg/dL (19 Jun 2025 21:44)  POCT Blood Glucose.: 124 mg/dL (19 Jun 2025 17:07)      06-16 @ 13:09   Commensal marilee consistent with body site  --  --          RADIOLOGY & ADDITIONAL TESTS:    Imaging Personally Reviewed:       Advance Directives:      Palliative Care:  Appropriate

## 2025-06-20 NOTE — PROGRESS NOTE ADULT - ASSESSMENT
REASON FOR VISIT  .. Management of problems listed below      EVENTS/CURRENT ISSUES.  . 6/20/2025 apixa restartd and noc bpap orderde   . 6/16/2025 fob done oozing rul and l lo lobe post basal   . 6/14/2025 iv ufh dced   . 6/13/2025 continues to have mild hemoptysis but is also on iv ufh drip   . 6/13/2025 dw pt re rbaa of bronch and se agrees scheduled for 6/16 10 in or   . 6/13/2025 dw cardio and id   . 6/11/2025 qft funfitell and galactomannan orderd   . 6/11/2025 pt wa sstarted on iv ufh for hemoptysis and pleuritic chest pain but was stopped when vq showed vlp   . 6/10/2025 Mild hemoptysis   . 6/9 tr out of icu  . 6/8/2025  . PNEUMONIA RML 6/8/2025  . SHOCK 6/8/2025   . NOREPI 6/8/2025   . STRESS STEROIDS   .... HYDROCORTISONE 6/8/2025  . A fib (chronic) POA 6/8/2025  . ANEMIA Hb 6/8/2025 Hb 7.5  . KIMBERLY ON CKD Cr 6/8/2025 Cr 1.9        REVIEW OF SYMPTOMS   Able to give ROS  Yes     RELIABILITY +/-   CONSTITUTIONAL Weakness Yes    ENDOCRINE  No heat or cold intolerance    ALLERGY No hives  Sore throat No stridor  RESP Shortness of breath YES   NEURO New weakness No   CARDIAC   Palpitations No         PHYSICAL EXAM    HEENT Unremarkable  atraumatic   RESP Fair air entry  Harsh breath sound   CARDIAC S1 S2 No S3     NO JVD    ABDOMEN No hepatosplenomegaly   PEDAL EDEMA present No calf tenderness    REASON FOR VISIT  .. Management of problems listed below      CC.   . 6/8/2025 brought in by ems for right sided chest pain that started at 3am- nonradiating- patient was offered aspirin by ems- patient refused and stated to ems that she is on plavix and was advised not to take aspirin. patient on 43 litres nasl cannula-     OVERALL PRESENTATION.  . 6/8/2025 84 yr old female with PMHx afib on eliquis, COPD (not on home O2), PE/Rt lower extremity DVT 2017, Rt LE IVC filter 2017 CKD3, aplastic anemia, polymyalgia rheumatica on prednisone 5 mg po qd, requiring PRBC transfusions ~8 weeks (via Rt subclavian mediport), AVN bilat hips, HTN, HLD, HFpEF (75% 6.2.25), diastolic dysfx grade1, recent hospitalization 5/25/25-6/5/25 for ADHF/COPD exacerbation, Pt with eventual improvement and transfer to [facility] facility from where she presents to E.D. this a.m. 6/8/25 with c/o Rt sided chest pain. general malaise. Pt stated began to feel ill evening before presentation, daughter this a.m. endorsed pt lethargic, pale.     In E.D. Pt found to have fever with tmax of 101, KIMBERLY on CKD with sCr 1.90 (baseline 1.2-1.6), HYPOtnsive with SBP 80's (pt on midodrine therapy, usual 's). In addition found to have leukocytosis of 37.6 (baseline 5.25 6/5/25), procalcitonin of 13.6, Hgb 7.5, elevated INR 2.27,  Chest xray demonstrated RML consolidations, concerning for pneum. In E.D. pt receiving 500 cc's NS, Vanco 1 gm, zosyn. Pt received tylenol 1 gm IV x1.       BEST PRACTICE ISSUES.  . HOB ELEVATN.    .... Yes  . DIET  .   .... DASH 6/8/2025   . FREE WATER.   ....   .  IV FLUID.  .... 6/15/2025 1/2 ns 50   ..... 6/8 lr 40 x 12 h   . PHARMAC DVT PPLX .    .... 6/12-6/14/2025 iv ufh (hospitalist)  .... 6/11/2025 [facility] 5k.2   .... 6/10/2025 4:46 PM iv ufh   .... [facility] [place] 6/9-6/10/2025   . NON PHARMAC DVT PPLX .      . STRESS ULCR PPLX .   ....   . DATE/DM MGMT.   ..... See under Endocrine section   GENERAL DATA .   . COVID.         .... scv2 6/8/2025 scv2 (-)   . GOC.    ....    . ICU STAY.    .... 6/8-6/9   . INFECTION PPLX .   .... CHLORHEXIDINE 2% 6/8/2025   . ALLGY.   ....  nka  . WT.   .... 6/8/2025 69   . BMI.    [id number]  VITALS/GAS EXCHANGE/DRIPS    ABGS.   6/20/2025 4p 5l 736/71/65   .  VS/ PO/IO/ VENT/ DRIPS.  6/20/2025 afeb 84 120/71   6/20/2025 4l 96%     [id number]  PROBLEM ASSESSMENT RECOMMENDATIONS.  RESP.   . GAS EXCHANGE .   .... target PO 90-95%     . NEED FOR HOME OXYGEN   .... 6/18/2025 will need home oxygen if at discharge pulse ox at rest or on exertion is below 88%     . COPD   .... ADVAIR 500 6/8/2025   .... MONTELUKAST 10 6/8/2025   .... SPIRIVA 6/8/2025     . RESP FAILURE  .... 6/8/2025 Has ho hypercapnia   .... 6/8/2025 recommend monitor abg  .... 6/18/2025 requiring 4l nc     . HYPERCAPNIC RESP FAILURE WITHOUT ACIDEMIA   6/20/2025 4p 5l 736/71/65   .... 6/20/2025 sterted noct bpap 35/15/6/16    . MILD HEMOPTYSIS 6/10/2025   .... ct ch 6/10/2025 cw 5/31  ........ new mf infection in rul   ........ unchanged small r greater than left pl effsn   ....... enlarged pulm artery suggesting pulm hytn   .... 6/10/2025  dw hemat cardio id and instead of restarting apixa will start iv UFH which can be easily reversed in case Hb starts dropping but will be the rx for venous thromboembolism as well as for a fib   .... v duplx 6/10 (-)  .... vq 6/10 vlp   .... 6/13/2025 continues to have mild hemoptysis but is also on iv ufh drip   . 6/13/2025 dw pt re rbaa of bronch and se agrees scheduled for 6/16 10 in or   .... 6/13/2025 dw cardio and id   .... 6/16 fob done showed ooze rul and l lo lobe no eb lesions   .... 6/17/2025 continues to have mild hemoptysis   .... 6/17/2025 30% of hemoptysis cases no cause is found  Ordered gbm ab rf dsdna rf apla chapman   .... 6/18/2025 cbmab dsdna inderjit apla ab all (-)   .... 6/12 fungitell galactomannan (-)   .... strep legn ag 6/9 (-)   .... fob 6/16 afb sm (-) cyto (-)  .... 6/18/2025 hemoptysis likely sec to pneumonia in setting of pulm hypertension   .... 6/20/2025 apixa restarted     . PLEURITIC CHEST PAIN RO VTE   .... v duplx 6/10 (-)  .... vq 6/10 vlp   .... 6/12/2025 iv ufh was stopped 6/11 as vq vlp but was restarted by hospitalist 6/12/2025 for af   .... chest pain resolvd       INFECTION.  . DATA  .... w 6/8-6/10-6/13-6/14-6/15-6/17-6/18-6/19-6/20/2025   .... w 13.6 - 9.2 - 15.9 - 18- 14 - 9.6 - 11.9- 9.8 - 8.7   .... esr 6/8/2025 esr 17   .... w 6/8-6/9/2025 w 32 - 24   .... ua 6/8/2025 w 4   .... cxr 6/8/2025 cxr dense infiltra r upper hilum r mediport  .... ct ch 6/10 cw 5/31  ........ r greater than l effs   ........ partial rll atelectasis   ........ new patchy and nodular areas of consolidation rul and associated ground glass opacities   ........ ground glass and nodular opac also scott   ........ numerous tib rml and rll     . MICROBIO  .... sp 6/8 n commensals   .... flu ab 6/8/2025 (-)   .... rsv 6/8/2025 (-)    .... mrsa 6/9/2025 (-)  .... uc 6/8 (-)  .... bc 6/8 (-)     . PNEUMONIA RUL 6/8/2025   .... AZITHRO 6/8-6/11 /2025   .... ZOSYN 6/8-6/15/2025     CARDIAC.  . PAIN CHEST   .... 6/10/2025 co pain chest r   .... 6/10/2025 check ct to see if she has pleurisy   .... 6/10/2025  dw hemat cardio id and instead of restarting apixa will start iv UFH which can be easily reversed in case Hb starts dropping but will be the rx for venous thromboembolism as well as for a fib     . STRESS STEROIDS   .... HYDROCORTISONE   ........ 6/8/2025 h 50.4  ........ 6/10/2025 h 50.2   ........ 6/12/2025 hydrocort 50   ....... 6/14/2025 h 25  ........ 6/17/2025 dced     . SHOCK   .... MIDODRINE 6/8-6/16/2025 m 10.3   .... DROXIDOPA 6/9-6/16/2025 d 100.3   .... NOREPI 6/8-6/9/2025 n 8/250   .... target map 65 (+)     . CAD    .... Trop 6/8-6/9/2025 Tr 32- 24     . LACTICEMIA   .... la 6/8/2025 la 1.4     . CHF  .... pbnp 6/8/2025 pbnp 6599   .... tte 4/2025 mild ph  n vf  .... tte 6/2/2025   ........ ef 71%   ........ n rvsf    ........ pasp 54   ....... la mod dilatd   .... torsemide 20 6/17/2025  .... lasix 40 once 6/14/2025    . A fib (chronic) POA 6/8/2025  .... AMIODARONE 100 6/8/2025  .... 6/10/2025  dw hemat cardio id and instead of restarting apixa will start iv UFH which can be easily reversed in case Hb starts dropping but will be the rx for venous thromboembolism as well as for a fib   .... 6/12-6/14/2025 iv ufh     HEMAT.  . DATA  .... Plt 6/8/2025 plt 153   .... inr 6/8-6/9/2025 inr 2.27- 1.63      . ANEMIA   .... Hb 6/19-6/20/2025 Hb 7.8 - 9.2    .... Hb 6/8-6/9-6/10-6/11-6/13-6/14-6/15-6/17-6/18/2025   .... Hb 7.5- 7.8 - 7.6- 7.1 - 8.4 - 7.3- 9.1 - 8.1 - 8.5    . TRANSFUSION  .... 6/8  1 u prbc    .... 6/14 2 u prbc     GI.   . DIET .   .... dash 6/8/2025     . LFT MONITORING   .... LFTS    6/8/2025  ........ AP     39  ........ AST   11  ........ ALT    35    RENAL.  . DATA  .... Na 6/8/2025 Na 137  .... K 6/8/2025 K 4   .... CO2 6/8/2025 co2 29   .... ag 6/8/2025 ag 9  .... alb 6/8/2025 alb 3.4     . KIMBERLY ON CKD   .... Cr 6/8-6/9-6/10-6/11-6/13-6/14/2025   .... Cr 1.9 - 1.4 - 1.3 - 1.3 - 1.1 - 1  .... Cr 5/27-5/28-5/29-5/30-6/1/2025   .... Cr 1.2 - 1.3 - 1.3 - 1.6 - 1.6    ENDO.  . DM  .  .... 6/8/2025 SL SCALE     NEURO.  . PAIN  .... GABAPENTIN 6/8/2025 g 400.2         [id number]   SUMMARY BASELINE .     84 yr old female pt of Dr Gallegos not on home ox or home cpap used to use trelegy lives with spouse quit 40 y 1/2 ppd with PMHx afib on eliquis, COPD (not on home O2), PE/Rt lower extremity DVT 2017, Rt LE IVC filter 2017 CKD3, aplastic anemia, polymyalgia rheumatica on prednisone 5 mg po qd, requiring PRBC transfusions around 8 weeks (via Rt subclavian mediport), AVN bilat hips, HTN, HLD, HFpEF (75% 6.2.25), diastolic dysfx grade1, recent hospitalization 5/25/25-6/5/25 for ADHF/COPD exacerbation, Pt with eventual improvement and transfer to [facility] facility   CC.   . 6/8/2025 R CHEST PAIN   MAIN ISSUES.  . COPD   . HYPERCAPNIC RESP FAILURE  6/20/2025 4p 5l 736/71/65   .... 6/20/2025 Noct bpap 15/5/35/16 ordrd   . MILD HEMOPTYSOIS 6/10/2025   . PNEUMONIA RML 6/8/2025  .... ZOSYN 6/8/2025   . PLEURITIS CHEST PAIN 6/10/2025  .... 6/10 vte excluded vlp vq   . SHOCK 6/8/2025   .... resolvd tr oo icu 6/9   . NOREPI 6/8-6/9/2025   . STRESS STEROIDS   .... HYDROCORTISONE 6/8-6/17/2025  . A fib (chronic) POA 6/8/2025  .... 6/10-6/10/2025 IV UFH   .... 6/12- 6/14 IV UFH   . ANEMIA Hb 6/8-6/13/2025 Hb 7.5- 8.4  . TRANSFUSION  .... 6/8  1 u prbc    . KIMBERLY ON CKD Cr 6/8-6/13/2025 Cr 1.9- 1.1  PMH.   . AFon Eliquis,   . COPD (not on home o2),   . CKD,   . aplastic anemia,   . TRANSFUSION 6/2/2025 1 u prbc   . PMR (chronic prednisone with AVN hip),   . HLD,   . HTN,   . DM    PROCEDURES/DEVICES .  . 6/16/2025 FOB br wash rul ooze rul l lo lobe post basal     PAST PROCEDURES   . r mediport poa 6/8/2025     DISCUSSIONS.  .... Discussed with primary care and relevant consultants on an ongoing basis       TIME SPENT.  . Over 36 minutes aggregate care time spent on encounter; activities included   direct patient care, counseling and/or coordinating care reviewing notes, lab data/ imaging , discussion with multidisciplinary team/ patient  /family and explaining in detail risks, benefits, alternatives  of the recommendations     ROBERT HENRIQUEZ 84 6/8/2025 1942

## 2025-06-20 NOTE — PROGRESS NOTE ADULT - PROBLEM SELECTOR PLAN 3
- The patient is noted to have blood-tinged sputum  - requiring PRBC transfusions ~8 weeks (via Rt subclavian mediport)  - Hgb 7.6 (baseline appears to be ~8)   - transfuse <7-7.5  - Will hold Eliquis until clinical improvement is noted  - 6/12 heparin gtt started  - 6/13 : heparin gtt now discontinued 2/2 hemoptysis  - S/p 2 unit PRBC- will give lasix 40mg IVP x1 in between units -- good response  - ordered 1 unit pRBC on 06/19 - The patient is noted to have blood-tinged sputum  - requiring PRBC transfusions ~8 weeks (via Rt subclavian mediport)  - Hgb 7.6 (baseline appears to be ~8)   - transfuse <7-7.5  - 6/12 heparin gtt started  - 6/13 : heparin gtt now discontinued 2/2 hemoptysis  - S/p 2 unit PRBC- will give lasix 40mg IVP x1 in between units -- good response  - ordered 1 unit pRBC on 06/19-- improved hgb   - restarted eliquis 2.5mg BID today, will monitor

## 2025-06-20 NOTE — PROGRESS NOTE ADULT - PROBLEM SELECTOR PLAN 9
chronic rate controlled   monitor sputum production ( blood tinged today )   would benefit overall from therapeutic AC but given low h/h  c/w amiodarone 100mg qd.  - 6/12 heparin gtt started  - 6/13 : heparin gtt now discontinued 2/2 hemoptysis  - as hemoptysis is resolving and IF hgb increases beyond 9, will resume Eliquis chronic rate controlled   monitor sputum production ( blood tinged today )   would benefit overall from therapeutic AC but given low h/h  c/w amiodarone 100mg qd.  - 6/12 heparin gtt started  - 6/13 : heparin gtt now discontinued 2/2 hemoptysis  - resumed eliquis

## 2025-06-20 NOTE — PROGRESS NOTE ADULT - PROBLEM SELECTOR PLAN 12
#Edema:  trace edema  -restarted home Torsemide 20 mg daily    #VTE ppx: - Hep gtt discontinued 2/2 hemoptysis   #GI ppx: iv ppi  #Diet: Diet, Regular: DASH/TLC {Sodium & Cholesterol Restricted} (06-08-25 @ 15:56) [Active]  #Activity: Activity - Increase As Tolerated:   Time/Priority:  Routine (06-08-25 @ 14:16) [Active]  #ACP: full code  #Dispo: further plans pending clinical course #Edema:  trace edema  -restarted home Torsemide 20 mg daily    #VTE ppx: - eliquis   #GI ppx: iv ppi  #Diet: Diet, Regular: DASH/TLC {Sodium & Cholesterol Restricted} (06-08-25 @ 15:56) [Active]  #Activity: Activity - Increase As Tolerated:   Time/Priority:  Routine (06-08-25 @ 14:16) [Active]  #ACP: full code  #Dispo: further plans pending clinical course

## 2025-06-20 NOTE — PROGRESS NOTE ADULT - ASSESSMENT
83 yo woman well known to our office w multifactorial anemia (MDS, renal insuff, iron def, on Procrit, prn IV iron, and PRBC transfusin dependent)  Recent adm for COPD exacerbation dc to Rehab then readm this time for pneumonia/sepsis    -post tx 1U PRBC 6/19, Hgb today incr to 9.2 from 7.8, no further transfusion needed  -restarted on Eliquis, 2.5mg qD today  -agree w Rehab plan      will followup as outpatient  will sign off for now,. please gary if any questions

## 2025-06-20 NOTE — PROGRESS NOTE ADULT - ASSESSMENT
Prerenal azotemia, CKD 3  Dyspnea: Pneumonia, h/o COPD/CHF  s/p Shock   Diabetes  Aplastic anemia, requiring frequent blood transfusions  Hemoptysis    Stable renal indices. H/H better. To continue current meds. Monitor blood sugar levels. Insulin coverage as needed. Trend BP and titrate BP meds as needed.   Avoid nephrotoxic meds as possible. Pulmonary follow up. Will follow electrolytes and renal function trend. D/c planning.

## 2025-06-20 NOTE — PROGRESS NOTE ADULT - SUBJECTIVE AND OBJECTIVE BOX
[facility]                                                       Dr. Chisholm, Dr. Franklin, Dr. Baron, Dr. Clark, Dr. Stock                                      [facility] [place]note 2], [facility], [address]                                                 [address] [place] [address]                                      Ph: [phone]  Fax: [phone]                                         Ph: [phone]  Fax: [phone]      Patient is a 84y old  Female who presents with a chief complaint of septic shock, PNA (09 Jun 2025 12:36)  Patient seen in follow up for KIMBERLY on CKD.        PAST MEDICAL HISTORY:  COPD (chronic obstructive pulmonary disease)    DM (diabetes mellitus)    Pulmonary fibrosis    PAF (paroxysmal atrial fibrillation)    Hiatal hernia    GIB (gastrointestinal bleeding)    Pericarditis    Shoulder fracture, right    Hematoma    H/O aplastic anemia    History of IBS    H/O osteoporosis    HLD (hyperlipidemia)    PMR (polymyalgia rheumatica)    History of basal cell cancer    Chronic kidney disease (CKD)    Hypotension    Presence of IVC filter    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    History of immunodeficiency    Lumbar compression fracture    H/O hiatal hernia    H/O pulmonary fibrosis    H/O sinus bradycardia    History of fracture of right hip      MEDICATIONS  (STANDING):  albuterol/ipratropium for Nebulization 3 milliLiter(s) Nebulizer every 6 hours  aMIOdarone    Tablet 100 milliGRAM(s) Oral daily  apixaban 2.5 milliGRAM(s) Oral two times a day  atorvastatin 10 milliGRAM(s) Oral at bedtime  chlorhexidine 2% Cloths 1 Application(s) Topical <User Schedule>  dextrose 50% Injectable 25 Gram(s) IV Push once  dextrose 50% Injectable 12.5 Gram(s) IV Push once  dextrose 50% Injectable 25 Gram(s) IV Push once  fluticasone propionate/ salmeterol 500-50 MICROgram(s) Diskus 1 Dose(s) Inhalation two times a day  gabapentin 400 milliGRAM(s) Oral every 12 hours  insulin lispro (ADMELOG) corrective regimen sliding scale   SubCutaneous three times a day before meals  insulin lispro (ADMELOG) corrective regimen sliding scale   SubCutaneous at bedtime  pantoprazole  Injectable 40 milliGRAM(s) IV Push daily  predniSONE   Tablet 5 milliGRAM(s) Oral daily  torsemide 20 milliGRAM(s) Oral daily    MEDICATIONS  (PRN):  acetaminophen     Tablet .. 650 milliGRAM(s) Oral every 6 hours PRN Mild Pain (1 - 3)  dextrose Oral Gel 15 Gram(s) Oral once PRN Blood Glucose LESS THAN 70 milliGRAM(s)/deciliter    T(C): 36.8 (06-20-25 @ 11:29), Max: 37.1 (06-19-25 @ 20:08)  HR: 84 (06-20-25 @ 11:29) (82 - 93)  BP: 127/71 (06-20-25 @ 11:29) (117/69 - 136/67)  RR: 18 (06-20-25 @ 11:29)  SpO2: 96% (06-20-25 @ 11:29)  Wt(kg): --  I&O's Detail    19 Jun 2025 07:01  -  20 Jun 2025 07:00  --------------------------------------------------------  IN:    Oral Fluid: 120 mL  Total IN: 120 mL    OUT:    Voided (mL): 1400 mL  Total OUT: 1400 mL    Total NET: -1280 mL                        PHYSICAL EXAM:  General: No distress  Respiratory: b/l air entry  Cardiovascular: S1 S2  Gastrointestinal: soft  Extremities:  edema          LABORATORY:                        9.2    8.70  )-----------( 277      ( 20 Jun 2025 08:15 )             27.7     06-20    144  |  105  |  32[H]  ----------------------------<  129[H]  3.8   |  37[H]  |  0.85    Ca    9.0      20 Jun 2025 08:15  Phos  2.9     06-20  Mg     1.4     06-20    TPro  6.1  /  Alb  3.1[L]  /  TBili  0.8  /  DBili  x   /  AST  6[L]  /  ALT  17  /  AlkPhos  54  06-20    Sodium: 144 mmol/L (06-20 @ 08:15)  Sodium: 144 mmol/L (06-19 @ 06:55)    Potassium: 3.8 mmol/L (06-20 @ 08:15)  Potassium: 3.7 mmol/L (06-19 @ 06:55)    Hemoglobin: 9.2 g/dL (06-20 @ 08:15)  Hemoglobin: 7.8 g/dL (06-19 @ 06:55)  Hemoglobin: 8.5 g/dL (06-18 @ 07:50)    Creatinine, Serum 0.85 (06-20 @ 08:15)  Creatinine, Serum 0.94 (06-19 @ 06:55)  Creatinine, Serum 0.97 (06-18 @ 07:50)    CARDIAC MARKERS ( 19 Jun 2025 06:55 )  x     / x     / x     / x     / <1.0 ng/mL      LIVER FUNCTIONS - ( 20 Jun 2025 08:15 )  Alb: 3.1 g/dL / Pro: 6.1 g/dL / ALK PHOS: 54 U/L / ALT: 17 U/L / AST: 6 U/L / GGT: x           Urinalysis Basic - ( 20 Jun 2025 08:15 )    Color: x / Appearance: x / SG: x / pH: x  Gluc: 129 mg/dL / Ketone: x  / Bili: x / Urobili: x   Blood: x / Protein: x / Nitrite: x   Leuk Esterase: x / RBC: x / WBC x   Sq Epi: x / Non Sq Epi: x / Bacteria: x

## 2025-06-20 NOTE — PROGRESS NOTE ADULT - SUBJECTIVE AND OBJECTIVE BOX
All interim records and events noted.    comfortable in chair      MEDICATIONS  (STANDING):  albuterol/ipratropium for Nebulization 3 milliLiter(s) Nebulizer every 6 hours  aMIOdarone    Tablet 100 milliGRAM(s) Oral daily  apixaban 2.5 milliGRAM(s) Oral two times a day  atorvastatin 10 milliGRAM(s) Oral at bedtime  chlorhexidine 2% Cloths 1 Application(s) Topical <User Schedule>  dextrose 50% Injectable 25 Gram(s) IV Push once  dextrose 50% Injectable 12.5 Gram(s) IV Push once  dextrose 50% Injectable 25 Gram(s) IV Push once  fluticasone propionate/ salmeterol 500-50 MICROgram(s) Diskus 1 Dose(s) Inhalation two times a day  gabapentin 400 milliGRAM(s) Oral every 12 hours  insulin lispro (ADMELOG) corrective regimen sliding scale   SubCutaneous three times a day before meals  insulin lispro (ADMELOG) corrective regimen sliding scale   SubCutaneous at bedtime  pantoprazole  Injectable 40 milliGRAM(s) IV Push daily  predniSONE   Tablet 5 milliGRAM(s) Oral daily  torsemide 20 milliGRAM(s) Oral daily    MEDICATIONS  (PRN):  acetaminophen     Tablet .. 650 milliGRAM(s) Oral every 6 hours PRN Mild Pain (1 - 3)  dextrose Oral Gel 15 Gram(s) Oral once PRN Blood Glucose LESS THAN 70 milliGRAM(s)/deciliter      Vital Signs Last 24 Hrs  T(C): 36.8 (20 Jun 2025 11:29), Max: 37.1 (19 Jun 2025 20:08)  T(F): 98.3 (20 Jun 2025 11:29), Max: 98.8 (19 Jun 2025 20:08)  HR: 84 (20 Jun 2025 11:29) (84 - 93)  BP: 127/71 (20 Jun 2025 11:29) (117/69 - 130/69)  BP(mean): --  RR: 18 (20 Jun 2025 11:29) (18 - 18)  SpO2: 96% (20 Jun 2025 11:29) (95% - 96%)    Parameters below as of 20 Jun 2025 11:29  Patient On (Oxygen Delivery Method): nasal cannula  O2 Flow (L/min): 4      PHYSICAL EXAM  General: in no acute distress  Head: atraumatic, normocephalic  ENT: buccal mucosa moist  Neck: supple, trachea midline  CV: S1 S2, regular rate and rhythm  Lungs: clear to auscultation, no wheezes/rhonchi  Abdomen: soft, nontender, bowel sounds present, no palpable masses  Skin: no significant increased ecchymosis/petechiae        LABS:             9.2    8.70  )-----------( 277      ( 06-20 @ 08:15 )             27.7                7.8    9.83  )-----------( 268      ( 06-19 @ 06:55 )             23.8                8.5    11.98 )-----------( 323      ( 06-18 @ 07:50 )             26.2       06-20    144  |  105  |  32[H]  ----------------------------<  129[H]  3.8   |  37[H]  |  0.85    Ca    9.0      20 Jun 2025 08:15  Phos  2.9     06-20  Mg     1.4     06-20    TPro  6.1  /  Alb  3.1[L]  /  TBili  0.8  /  DBili  x   /  AST  6[L]  /  ALT  17  /  AlkPhos  54  06-20 06-18 @ 07:50  PT15.9 INR1.36  PTT--      RADIOLOGY & ADDITIONAL STUDIES:    IMPRESSION/RECOMMENDATIONS:

## 2025-06-20 NOTE — PROGRESS NOTE ADULT - SUBJECTIVE AND OBJECTIVE BOX
CHIEF COMPLAINT/ REASON FOR VISIT  .. Patient was seen to address the  issue listed under PROBLEM LIST which is located toward bottom of this note     MARTINEZ PATEL TELE 306 W1    Allergies    No Known Allergies    Intolerances        PAST MEDICAL & SURGICAL HISTORY:  COPD (chronic obstructive pulmonary disease)      DM (diabetes mellitus)  diet-controlled      PAF (paroxysmal atrial fibrillation)  s/p ablation      Hiatal hernia      H/O aplastic anemia      History of IBS      H/O osteoporosis      HLD (hyperlipidemia)      PMR (polymyalgia rheumatica)      History of basal cell cancer      Chronic kidney disease (CKD)      Hypotension      Presence of IVC filter      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      History of immunodeficiency      Lumbar compression fracture      H/O hiatal hernia      H/O pulmonary fibrosis      H/O sinus bradycardia      History of fracture of right hip  "unoperable"      S/P hysterectomy      S/P foot surgery      S/P knee surgery      After cataract  bilateral eye cataract surgically removed      Closed right hip fracture  rigth hip ORIF july 19 2016      S/P IVC filter  nov 2016      S/P carpal tunnel release  Right 2008 & 6/27/17      H/O kyphoplasty      Port-A-Cath in place  right chest          FAMILY HISTORY:  Family history of bladder cancer (Mother)    Family history of myocardial infarction (Father)    FH: atrial fibrillation (Father)        Home Medications:  amiodarone 200 mg oral tablet: 0.5 tab(s) orally once a day (08 Jun 2025 16:43)  Bentyl 10 mg oral capsule: 1 cap(s) orally 2 times a day, As Needed (08 Jun 2025 16:43)  Eliquis 2.5 mg oral tablet: 1 tab(s) orally 2 times a day (08 Jun 2025 16:43)  esomeprazole 20 mg oral delayed release capsule: 1 cap(s) orally once a day (08 Jun 2025 16:43)  fluticasone-salmeterol 500 mcg-50 mcg/inh inhalation powder: 1 puff(s) inhaled 2 times a day (08 Jun 2025 16:43)  folic acid 1 mg oral tablet: 1 tab(s) orally once a day (in the morning) (08 Jun 2025 16:43)  gabapentin 400 mg oral capsule: 1 cap(s) orally 2 times a day (08 Jun 2025 16:43)  ipratropium-albuterol 0.5 mg-2.5 mg/3 mL inhalation solution: 3 milliliter(s) inhaled every 6 hours As needed Shortness of Breath and/or Wheezing (08 Jun 2025 16:43)  IVIG monthly:  (08 Jun 2025 16:43)  leflunomide 10 mg oral tablet: 1 tab(s) orally once a day (08 Jun 2025 16:43)  midodrine 10 mg oral tablet: 1 tab(s) orally every 8 hours (08 Jun 2025 16:43)  montelukast 10 mg oral tablet: 1 tab(s) orally once a day (08 Jun 2025 16:43)  pantoprazole 40 mg oral delayed release tablet: 1 tab(s) orally once a day (before a meal) (08 Jun 2025 16:43)  predniSONE 5 mg oral tablet: 1 tab(s) orally once a day (08 Jun 2025 16:43)  Procrit 10,000 units/mL preservative-free injectable solution: injectable once a week WEDNESDAY (08 Jun 2025 16:43)  Reclast Infusion , IV x 1 yearly:  (08 Jun 2025 16:43)  simvastatin 10 mg oral tablet: 1 tab(s) orally once a day (at bedtime) (08 Jun 2025 16:43)  tiotropium 2.5 mcg/inh inhalation aerosol: 2 puff(s) inhaled once a day (08 Jun 2025 16:43)  tiZANidine: 2 tab(s) orally once a day (at bedtime) as needed for  muscle spasm (08 Jun 2025 16:43)  torsemide 20 mg oral tablet: 1 tab(s) orally once a day (in the morning) (08 Jun 2025 16:49)      MEDICATIONS  (STANDING):  albuterol/ipratropium for Nebulization 3 milliLiter(s) Nebulizer every 6 hours  aMIOdarone    Tablet 100 milliGRAM(s) Oral daily  atorvastatin 10 milliGRAM(s) Oral at bedtime  chlorhexidine 2% Cloths 1 Application(s) Topical <User Schedule>  dextrose 50% Injectable 25 Gram(s) IV Push once  dextrose 50% Injectable 12.5 Gram(s) IV Push once  dextrose 50% Injectable 25 Gram(s) IV Push once  fluticasone propionate/ salmeterol 500-50 MICROgram(s) Diskus 1 Dose(s) Inhalation two times a day  gabapentin 400 milliGRAM(s) Oral every 12 hours  insulin lispro (ADMELOG) corrective regimen sliding scale   SubCutaneous three times a day before meals  insulin lispro (ADMELOG) corrective regimen sliding scale   SubCutaneous at bedtime  pantoprazole  Injectable 40 milliGRAM(s) IV Push daily  predniSONE   Tablet 5 milliGRAM(s) Oral daily  torsemide 20 milliGRAM(s) Oral daily    MEDICATIONS  (PRN):  acetaminophen     Tablet .. 650 milliGRAM(s) Oral every 6 hours PRN Mild Pain (1 - 3)  dextrose Oral Gel 15 Gram(s) Oral once PRN Blood Glucose LESS THAN 70 milliGRAM(s)/deciliter      Diet, Regular:   DASH/TLC Sodium & Cholesterol Restricted (06-16-25 @ 21:27) [Active]          Vital Signs Last 24 Hrs  T(C): 36.6 (20 Jun 2025 05:00), Max: 37.1 (19 Jun 2025 20:08)  T(F): 97.8 (20 Jun 2025 05:00), Max: 98.8 (19 Jun 2025 20:08)  HR: 85 (20 Jun 2025 05:00) (82 - 93)  BP: 117/69 (20 Jun 2025 05:00) (117/69 - 130/69)  BP(mean): --  RR: 18 (20 Jun 2025 05:00) (18 - 20)  SpO2: 95% (20 Jun 2025 05:00) (95% - 97%)    Parameters below as of 20 Jun 2025 05:00  Patient On (Oxygen Delivery Method): nasal cannula  O2 Flow (L/min): 4        06-18-25 @ 07:01  -  06-19-25 @ 07:00  --------------------------------------------------------  IN: 0 mL / OUT: 1400 mL / NET: -1400 mL    06-19-25 @ 07:01  -  06-20-25 @ 06:29  --------------------------------------------------------  IN: 120 mL / OUT: 600 mL / NET: -480 mL              LABS:                        7.8    9.83  )-----------( 268      ( 19 Jun 2025 06:55 )             23.8     06-19    144  |  105  |  30[H]  ----------------------------<  115[H]  3.7   |  33[H]  |  0.94    Ca    8.5      19 Jun 2025 06:55  Phos  2.6     06-19  Mg     1.9     06-19    TPro  5.5[L]  /  Alb  2.7[L]  /  TBili  0.7  /  DBili  x   /  AST  13[L]  /  ALT  18  /  AlkPhos  38[L]  06-19    PT/INR - ( 18 Jun 2025 07:50 )   PT: 15.9 sec;   INR: 1.36 ratio           Urinalysis Basic - ( 19 Jun 2025 06:55 )    Color: x / Appearance: x / SG: x / pH: x  Gluc: 115 mg/dL / Ketone: x  / Bili: x / Urobili: x   Blood: x / Protein: x / Nitrite: x   Leuk Esterase: x / RBC: x / WBC x   Sq Epi: x / Non Sq Epi: x / Bacteria: x            WBC:  WBC Count: 9.83 K/uL (06-19 @ 06:55)  WBC Count: 11.98 K/uL (06-18 @ 07:50)  WBC Count: 9.69 K/uL (06-17 @ 09:25)      MICROBIOLOGY:  RECENT CULTURES:  06-16 Bronch Wash Bronchial Wash XXXX   Rare Squamous epithelial cells seen per low power field  No polymorphonuclear leukocytes seen per low power field  No organisms seen per oil power field   Commensal marilee consistent with body site    06-16 Bronch Wash Bronchial Wash XXXX XXXX   Culture is being performed.    06-16 Bronch Wash Bronchial Wash XXXX   Rare polymorphonuclear leukocytes per oil power field  No Squamous epithelial cells per oil power field  No organisms seen per oil power field   Commensal marilee consistent with body site          CARDIAC MARKERS ( 19 Jun 2025 06:55 )  x     / x     / x     / x     / <1.0 ng/mL        PT/INR - ( 18 Jun 2025 07:50 )   PT: 15.9 sec;   INR: 1.36 ratio             Sodium:  Sodium: 144 mmol/L (06-19 @ 06:55)  Sodium: 144 mmol/L (06-18 @ 07:50)  Sodium: 142 mmol/L (06-17 @ 09:25)      0.94 mg/dL 06-19 @ 06:55  0.97 mg/dL 06-18 @ 07:50  1.20 mg/dL 06-17 @ 09:25      Hemoglobin:  Hemoglobin: 7.8 g/dL (06-19 @ 06:55)  Hemoglobin: 8.5 g/dL (06-18 @ 07:50)  Hemoglobin: 8.1 g/dL (06-17 @ 09:25)      Platelets: Platelet Count - Automated: 268 K/uL (06-19 @ 06:55)  Platelet Count - Automated: 323 K/uL (06-18 @ 07:50)  Platelet Count - Automated: 330 K/uL (06-17 @ 09:25)      LIVER FUNCTIONS - ( 19 Jun 2025 06:55 )  Alb: 2.7 g/dL / Pro: 5.5 g/dL / ALK PHOS: 38 U/L / ALT: 18 U/L / AST: 13 U/L / GGT: x             Urinalysis Basic - ( 19 Jun 2025 06:55 )    Color: x / Appearance: x / SG: x / pH: x  Gluc: 115 mg/dL / Ketone: x  / Bili: x / Urobili: x   Blood: x / Protein: x / Nitrite: x   Leuk Esterase: x / RBC: x / WBC x   Sq Epi: x / Non Sq Epi: x / Bacteria: x        RADIOLOGY & ADDITIONAL STUDIES:      MICROBIOLOGY:  RECENT CULTURES:  06-16 Bronch Wash Bronchial Wash XXXX   Rare Squamous epithelial cells seen per low power field  No polymorphonuclear leukocytes seen per low power field  No organisms seen per oil power field   Commensal marilee consistent with body site    06-16 Bronch Wash Bronchial Wash XXXX XXXX   Culture is being performed.    06-16 Bronch Wash Bronchial Wash XXXX   Rare polymorphonuclear leukocytes per oil power field  No Squamous epithelial cells per oil power field  No organisms seen per oil power field   Commensal marilee consistent with body site

## 2025-06-20 NOTE — PROGRESS NOTE ADULT - SUBJECTIVE AND OBJECTIVE BOX
[facility]  INFECTIOUS DISEASES - [address], [place] [address]  Tel: [phone]     Fax: [phone]  =======================================================    MARTINEZ JONES [id number]    Follow up: no fevers. Seen earlier today.     Allergies:  No Known Allergies      Antibiotics:  acetaminophen     Tablet .. 650 milliGRAM(s) Oral every 6 hours PRN  albuterol/ipratropium for Nebulization 3 milliLiter(s) Nebulizer every 6 hours  aMIOdarone    Tablet 100 milliGRAM(s) Oral daily  apixaban 2.5 milliGRAM(s) Oral two times a day  atorvastatin 10 milliGRAM(s) Oral at bedtime  chlorhexidine 2% Cloths 1 Application(s) Topical <User Schedule>  dextrose 50% Injectable 25 Gram(s) IV Push once  dextrose 50% Injectable 12.5 Gram(s) IV Push once  dextrose 50% Injectable 25 Gram(s) IV Push once  dextrose Oral Gel 15 Gram(s) Oral once PRN  fluticasone propionate/ salmeterol 500-50 MICROgram(s) Diskus 1 Dose(s) Inhalation two times a day  gabapentin 400 milliGRAM(s) Oral every 12 hours  insulin lispro (ADMELOG) corrective regimen sliding scale   SubCutaneous three times a day before meals  insulin lispro (ADMELOG) corrective regimen sliding scale   SubCutaneous at bedtime  pantoprazole  Injectable 40 milliGRAM(s) IV Push daily  predniSONE   Tablet 5 milliGRAM(s) Oral daily  torsemide 20 milliGRAM(s) Oral daily       REVIEW OF SYSTEMS:  CONSTITUTIONAL:  No Fever or chills  HEENT:  No sore throat or runny nose.  CARDIOVASCULAR: No chest pain  RESPIRATORY:  + cough, shortness of breath  GASTROINTESTINAL:  No nausea, vomiting or diarrhea.  GENITOURINARY:  No dysuria  NEUROLOGIC:  No headache or dizziness     Physical Exam:  ICU Vital Signs Last 24 Hrs  T(C): 36.7 (20 Jun 2025 20:13), Max: 36.8 (20 Jun 2025 11:29)  T(F): 98.1 (20 Jun 2025 20:13), Max: 98.3 (20 Jun 2025 11:29)  HR: 88 (20 Jun 2025 20:13) (84 - 92)  BP: 130/70 (20 Jun 2025 20:13) (117/69 - 130/70)  BP(mean): --  ABP: --  ABP(mean): --  RR: 19 (20 Jun 2025 20:13) (18 - 19)  SpO2: 95% (20 Jun 2025 20:13) (95% - 96%)    O2 Parameters below as of 20 Jun 2025 20:13  Patient On (Oxygen Delivery Method): nasal cannula  O2 Flow (L/min): 3         GEN: NAD  HEENT: normocephalic and atraumatic.   NECK: Supple.   LUNGS: Normal respiratory effort  HEART: Regular rate and rhythm   ABDOMEN: Soft, nontender, and nondistended.    EXTREMITIES: B/L Lower leg pitting edema.  NEUROLOGIC: Answering questions appropriately  PSYCHIATRIC: Appropriate affect .    Labs:  06-20    144  |  105  |  32[H]  ----------------------------<  129[H]  3.8   |  37[H]  |  0.85    Ca    9.0      20 Jun 2025 08:15  Phos  2.9     06-20  Mg     1.4     06-20    TPro  6.1  /  Alb  3.1[L]  /  TBili  0.8  /  DBili  x   /  AST  6[L]  /  ALT  17  /  AlkPhos  54  06-20                          9.2    8.70  )-----------( 277      ( 20 Jun 2025 08:15 )             27.7       Urinalysis Basic - ( 20 Jun 2025 08:15 )    Color: x / Appearance: x / SG: x / pH: x  Gluc: 129 mg/dL / Ketone: x  / Bili: x / Urobili: x   Blood: x / Protein: x / Nitrite: x   Leuk Esterase: x / RBC: x / WBC x   Sq Epi: x / Non Sq Epi: x / Bacteria: x      LIVER FUNCTIONS - ( 20 Jun 2025 08:15 )  Alb: 3.1 g/dL / Pro: 6.1 g/dL / ALK PHOS: 54 U/L / ALT: 17 U/L / AST: 6 U/L / GGT: x             RECENT CULTURES:  06-16 @ 13:09 Bronch Wash Bronchial Wash     Commensal marilee consistent with body site    Rare Squamous epithelial cells seen per low power field  No polymorphonuclear leukocytes seen per low power field  No organisms seen per oil power field      06-16 @ 11:55 Bronch Wash Bronchial Wash     Culture is being performed.        06-16 @ 11:53 Bronch Wash Bronchial Wash     Commensal marilee consistent with body site    Rare polymorphonuclear leukocytes per oil power field  No Squamous epithelial cells per oil power field  No organisms seen per oil power field      06-08 @ 18:25 Sputum Sputum     Commensal marilee consistent with body site    Few Squamous epithelial cells per low power field  Few polymorphonuclear leukocytes per low power field  Few Gram Positive Cocci in Pairs and Chains per oil power field      06-08 @ 15:44 Clean Catch     <10,000 CFU/mL Normal Urogenital Marilee        06-08 @ 11:20 Blood Blood-Peripheral     No growth at 5 days        06-08 @ 11:15 Blood Blood-Peripheral     No growth at 5 days              All imaging and data are reviewed.

## 2025-06-20 NOTE — PROGRESS NOTE ADULT - SUBJECTIVE AND OBJECTIVE BOX
[facility] -- Sai Valle Pannella, Patel, Savella Goodger, Cohen  Office # [phone]      Follow Up:  Hypotension, aifb     Subjective/Observations:   No events overnight resting comfortably in bed.  No complaints of chest pain, dyspnea, or palpitations reported. No signs of orthopnea or PND.  remains on NC    REVIEW OF SYSTEMS: All other review of systems is negative unless indicated above    PAST MEDICAL & SURGICAL HISTORY:  COPD (chronic obstructive pulmonary disease)      DM (diabetes mellitus)  diet-controlled      PAF (paroxysmal atrial fibrillation)  s/p ablation      Hiatal hernia      H/O aplastic anemia      History of IBS      H/O osteoporosis      HLD (hyperlipidemia)      PMR (polymyalgia rheumatica)      History of basal cell cancer      Chronic kidney disease (CKD)      Hypotension      Presence of IVC filter      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      History of immunodeficiency      Lumbar compression fracture      H/O hiatal hernia      H/O pulmonary fibrosis      H/O sinus bradycardia      History of fracture of right hip  "unoperable"      S/P hysterectomy      S/P foot surgery      S/P knee surgery      After cataract  bilateral eye cataract surgically removed      Closed right hip fracture  rigth hip ORIF 2016      S/P IVC filter  2016      S/P carpal tunnel release  Right  & 17      H/O kyphoplasty      Port-A-Cath in place  right chest          MEDICATIONS  (STANDING):  albuterol/ipratropium for Nebulization 3 milliLiter(s) Nebulizer every 6 hours  aMIOdarone    Tablet 100 milliGRAM(s) Oral daily  atorvastatin 10 milliGRAM(s) Oral at bedtime  chlorhexidine 2% Cloths 1 Application(s) Topical <User Schedule>  dextrose 50% Injectable 25 Gram(s) IV Push once  dextrose 50% Injectable 12.5 Gram(s) IV Push once  dextrose 50% Injectable 25 Gram(s) IV Push once  fluticasone propionate/ salmeterol 500-50 MICROgram(s) Diskus 1 Dose(s) Inhalation two times a day  gabapentin 400 milliGRAM(s) Oral every 12 hours  insulin lispro (ADMELOG) corrective regimen sliding scale   SubCutaneous three times a day before meals  insulin lispro (ADMELOG) corrective regimen sliding scale   SubCutaneous at bedtime  pantoprazole  Injectable 40 milliGRAM(s) IV Push daily  predniSONE   Tablet 5 milliGRAM(s) Oral daily  torsemide 20 milliGRAM(s) Oral daily    MEDICATIONS  (PRN):  acetaminophen     Tablet .. 650 milliGRAM(s) Oral every 6 hours PRN Mild Pain (1 - 3)  dextrose Oral Gel 15 Gram(s) Oral once PRN Blood Glucose LESS THAN 70 milliGRAM(s)/deciliter      Allergies    No Known Allergies    Intolerances        Vital Signs Last 24 Hrs  T(C): 36.6 (2025 05:00), Max: 37.1 (2025 20:08)  T(F): 97.8 (2025 05:00), Max: 98.8 (2025 20:08)  HR: 85 (2025 05:00) (82 - 93)  BP: 117/69 (2025 05:00) (117/69 - 130/69)  BP(mean): --  RR: 18 (2025 05:00) (18 - 20)  SpO2: 95% (2025 05:00) (95% - 97%)    Parameters below as of 2025 05:00  Patient On (Oxygen Delivery Method): nasal cannula  O2 Flow (L/min): 4      I&O's Summary    2025 07:01  -  2025 07:00  --------------------------------------------------------  IN: 120 mL / OUT: 1400 mL / NET: -1280 mL          PHYSICAL EXAM:  TELE: not on tele  Constitutional: NAD, awake and alert, well-developed  HEENT: Moist Mucous Membranes, Anicteric  Pulmonary: Non-labored, breath sounds are DIM  Cardiovascular: Regular, S1 and S2 nl, No murmurs, rubs, gallops or clicks  Gastrointestinal: Bowel Sounds present, soft, nontender.   Lymph:+ peripheral edema.  Skin: No visible rashes or ulcers.  Psych:  Mood & affect appropriate    LABS: All Labs Reviewed:                        7.8    9.83  )-----------( 268      ( 2025 06:55 )             23.8                         8.5    11.98 )-----------( 323      ( 2025 07:50 )             26.2                         8.1    9.69  )-----------( 330      ( 2025 09:25 )             25.4     2025 06:55    144    |  105    |  30     ----------------------------<  115    3.7     |  33     |  0.94   2025 07:50    144    |  104    |  29     ----------------------------<  129    3.8     |  34     |  0.97   2025 09:25    142    |  106    |  35     ----------------------------<  142    4.3     |  30     |  1.20     Ca    8.5        2025 06:55  Ca    9.0        2025 07:50  Ca    8.5        2025 09:25  Phos  2.6       2025 06:55  Phos  2.3       2025 07:50  Phos  2.7       2025 09:25  Mg     1.9       2025 06:55  Mg     1.3       2025 07:50  Mg     1.7       2025 09:25    TPro  5.5    /  Alb  2.7    /  TBili  0.7    /  DBili  x      /  AST  13     /  ALT  18     /  AlkPhos  38     2025 06:55  TPro  6.8    /  Alb  3.0    /  TBili  1.0    /  DBili  x      /  AST  <3     /  ALT  23     /  AlkPhos  46     2025 07:50  TPro  6.4    /  Alb  2.8    /  TBili  0.7    /  DBili  x      /  AST  5      /  ALT  21     /  AlkPhos  47     2025 09:25    PT/INR - ( 2025 07:50 )   PT: 15.9 sec;   INR: 1.36 ratio           CARDIAC MARKERS ( 2025 06:55 )  x     / x     / x     / x     / <1.0 ng/mL         EC Lead ECG:   Ventricular Rate 88 BPM    Atrial Rate 88 BPM    P-R Interval 130 ms    QRS Duration 78 ms    Q-T Interval 370 ms    QTC Calculation(Bazett) 447 ms    P Axis 79 degrees    R Axis -48 degrees    T Axis 78 degrees    Diagnosis Line Sinus rhythm with premature atrial complexes  Left anterior fascicular block  Possible Lateral infarct , age undetermined (25 @ 04:43)      TRANSTHORACIC ECHOCARDIOGRAM REPORT  ________________________________________________________________________________                                      _______       Pt. Name:       MARTINEZ JONES Study Date:    2025  MRN:            [id number]        YOB: 1940  Accession #:    [id number]       Age:           84 years  Account#:       [id number]      Gender:        F  Heart Rate:                     Height:        62.99 in (160.00 cm)  Rhythm:                         Weight:        147.71 lb (67.00 kg)  Blood Pressure: 109/63 mmHg     BSA/BMI:       1.70 m² / 26.17 kg/m²  ________________________________________________________________________________________  Referring Physician:    [id number] Naresh Vitale  Interpreting Physician: Daniel Jorge M.D.  Primary Sonographer:    Jamal Hayes    CPT:               ECHO TTE WO CON COMP W DOPP - 49777.m  Indication(s):     Shock, unspecified - R57.9  Procedure:         Transthoracic echocardiogram with 2-D, M-mode and complete                     spectral and color flow Doppler.  Ordering Location: ICU1  Admission Status:  Inpatient  Study Information: Image quality for this study is technically difficult.    _______________________________________________________________________________________     CONCLUSIONS:      1. Technically difficult image quality.   2. Left ventricular systolic function is normal with an ejection fraction of 67 % by Arias's method of disks.   3. Mild left ventricular hypertrophy.   4.Normal right ventricular cavity size and probably normal right ventricular systolic function.   5. Tricuspid aortic valve with normal leaflet excursion. There is calcification of the aortic valve leaflets.   6. Moderate mitral valve leaflet calcification.   7. Mild mitral regurgitation.   8. Moderate tricuspid regurgitation.   9. The inferior vena cava is normal in size measuring 1.64 cm in diameter, (normal <2.1cm) with normal inspiratory collapse (normal >50%) consistent with normal right atrial pressure (~3, range 0-5mmHg).  10. Estimated pulmonary artery systolic pressure is 57 mmHg, consistent with moderate-to-severe pulmonary hypertension.  11. Trace pericardial effusion.  12. Right pleural effusion noted.  13. Compared to the transthoracic echocardiogram performed on 2025, there have been no significant interval changes.    ________________________________________________________________________________________  FINDINGS:     Left Ventricle:  Left ventricular systolic function is normal with a calculated ejection fraction of 67 % by the Arias's biplane method of disks. Mild left ventricular hypertrophy.     Right Ventricle:  The right ventricular cavity is normal in size and right ventricular systolic function is probablynormal. Tricuspid annular plane systolic excursion (TAPSE) is 1.9 cm (normal >=1.7 cm).     Left Atrium:  The left atrium is normal in size with an indexed volume of 29.06 ml/m².     Right Atrium:  The right atrium is normal in size with an indexed volume of 26.00 ml/m² and an indexed area of 9.41 cm²/m².     Interatrial Septum:  The interatrial septum appears intact.     Aortic Valve:  The aortic valve is tricuspid with normal leaflet excursion. There is calcification of the aortic valve leaflets. There is mild thickening of the aortic valve leaflets. There is trace aortic regurgitation.     Mitral Valve:  Structurally normal mitral valve with normal leaflet excursion. There is calcification of the mitral valve annulus. There is moderate leaflet calcification. There is mild mitral regurgitation.     Tricuspid Valve:  The tricuspid valve is structurally normal with normal leaflet excursion. There is moderate tricuspid regurgitation. Estimated pulmonary artery systolic pressure is 57 mmHg,consistent with moderate-to-severe pulmonary hypertension.     Pulmonic Valve:  The pulmonic valve was not well visualized. There is trace pulmonic regurgitation.     Aorta:  The aortic root at the sinuses of Valsalva is normal in size, measuring 3.30 cm (indexed 1.94 cm/m²). The ascending aorta is normal in size, measuring 3.00 cm (indexed 1.76 cm/m²). The aortic arch diameter is normal in size, measuring 2.4 cm (indexed 1.41 cm/m²).     Pericardium:  There is a trace pericardial effusion.     Pleura:  Right pleural effusion noted.     Systemic Veins:  The inferior vena cava is normal in size measuring 1.64 cm in diameter, (normal <2.1cm) with normal inspiratory collapse (normal >50%) consistent with normal right atrial pressure (~3, range 0-5mmHg).  ____________________________________________________________________  QUANTITATIVE DATA:  Left Ventricle Measurements: (Indexed to BSA)     IVSd (2D):   1.2 cm  LVPWd (2D):  1.1 cm  LVIDd (2D):  4.2 cm  LVIDs (2D):  2.4 cm  LV Mass:     169 g  99.5 g/m²  LV Vol d, MOD A2C: 38.9 ml 22.88 ml/m²  LV Vol d, MOD A4C: 40.9 ml 24.06 ml/m²  LV Vol d, MOD BP:  40.9 ml 24.04 ml/m²  LV Vol s, MOD A2C: 11.3 ml 6.65 ml/m²  LV Vol s, MOD A4C: 13.4 ml 7.88 ml/m²  LV Vol s, MOD BP:  13.4 ml 7.91 ml/m²  LVOT SV MOD BP:    27.4 ml  LV EF% MOD BP:     67 %     MV E Vmax:    1.54 m/s  MV A Vmax:    0.74 m/s  MV E/A:       2.08  e' lateral:   11.90 cm/s  e' medial:    8.81 cm/s  E/e' lateral: 12.94  E/e' medial:  17.48  E/e' Average: 14.87  MV DT:272 msec    Aorta Measurements: (Normal range) (Indexed to BSA)     Ao Root d     3.30 cm (2.7 - 3.3 cm) 1.94 cm/m²  Ao Asc d, 2D: 3.00  Ao Asc prox:  3.00 cm                1.76 cm/m²  Ao Arch:      2.4 cm            Left Atrium Measurements: (Indexed to BSA)  LA Diam 2D: 3.60 cm         Right Ventricle Measurements: Right Atrial Measurements:     TAPSE:            1.9 cm      RA Vol s, MOD A4C         44.2 ml  RV Base (RVID1):  2.9 cm      RA Vol s, MOD A4C i BSA   26.00 ml/m²  RV Mid (RVID2): 2.5 cm      RA Area s, MOD A4C        16.0 cm²  RV Major (RVID3): 6.4 cm      RA Area s, MOD A4C, i BSA 9.41 cm²/m²       LVOT / RVOT/ Qp/Qs Data: (Indexed to BSA)  LVOT Diameter,s: 2.20 cm  LVOT Area:       3.80 cm²    Mitral Valve Measurements:     MV E Vmax: 1.5 m/s         MR Vmax:          3.80 m/s  MV A Vmax: 0.7 m/s         MR Peak Gradient: 57.8 mmHg  MV E/A:    2.1       Tricuspid Valve Measurements:     TR Vmax:          3.7 m/s  TR Peak Gradient: 54.5 mmHg  RA Pressure:      3 mmHg  PASP:             57 mmHg    ________________________________________________________________________________________  Electronically signed on 2025 at 11:19:51 AM by Daniel Jorge M.D.         *** Final ***      Radiology:         [facility] -- Sai Valle Pannella, Patel, Savella Goodger, Cohen  Office # [phone]      Follow Up:  Hypotension, aifb     Subjective/Observations:   No events overnight resting comfortably in bed.  No complaints of chest pain, dyspnea, or palpitations reported. No signs of orthopnea or PND.  remains on NC, is on home o2     REVIEW OF SYSTEMS: All other review of systems is negative unless indicated above    PAST MEDICAL & SURGICAL HISTORY:  COPD (chronic obstructive pulmonary disease)      DM (diabetes mellitus)  diet-controlled      PAF (paroxysmal atrial fibrillation)  s/p ablation      Hiatal hernia      H/O aplastic anemia      History of IBS      H/O osteoporosis      HLD (hyperlipidemia)      PMR (polymyalgia rheumatica)      History of basal cell cancer      Chronic kidney disease (CKD)      Hypotension      Presence of IVC filter      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      History of immunodeficiency      Lumbar compression fracture      H/O hiatal hernia      H/O pulmonary fibrosis      H/O sinus bradycardia      History of fracture of right hip  "unoperable"      S/P hysterectomy      S/P foot surgery      S/P knee surgery      After cataract  bilateral eye cataract surgically removed      Closed right hip fracture  rigth hip ORIF 2016      S/P IVC filter  2016      S/P carpal tunnel release  Right  & 17      H/O kyphoplasty      Port-A-Cath in place  right chest          MEDICATIONS  (STANDING):  albuterol/ipratropium for Nebulization 3 milliLiter(s) Nebulizer every 6 hours  aMIOdarone    Tablet 100 milliGRAM(s) Oral daily  atorvastatin 10 milliGRAM(s) Oral at bedtime  chlorhexidine 2% Cloths 1 Application(s) Topical <User Schedule>  dextrose 50% Injectable 25 Gram(s) IV Push once  dextrose 50% Injectable 12.5 Gram(s) IV Push once  dextrose 50% Injectable 25 Gram(s) IV Push once  fluticasone propionate/ salmeterol 500-50 MICROgram(s) Diskus 1 Dose(s) Inhalation two times a day  gabapentin 400 milliGRAM(s) Oral every 12 hours  insulin lispro (ADMELOG) corrective regimen sliding scale   SubCutaneous three times a day before meals  insulin lispro (ADMELOG) corrective regimen sliding scale   SubCutaneous at bedtime  pantoprazole  Injectable 40 milliGRAM(s) IV Push daily  predniSONE   Tablet 5 milliGRAM(s) Oral daily  torsemide 20 milliGRAM(s) Oral daily    MEDICATIONS  (PRN):  acetaminophen     Tablet .. 650 milliGRAM(s) Oral every 6 hours PRN Mild Pain (1 - 3)  dextrose Oral Gel 15 Gram(s) Oral once PRN Blood Glucose LESS THAN 70 milliGRAM(s)/deciliter      Allergies    No Known Allergies    Intolerances        Vital Signs Last 24 Hrs  T(C): 36.6 (2025 05:00), Max: 37.1 (2025 20:08)  T(F): 97.8 (2025 05:00), Max: 98.8 (2025 20:08)  HR: 85 (2025 05:00) (82 - 93)  BP: 117/69 (2025 05:00) (117/69 - 130/69)  BP(mean): --  RR: 18 (2025 05:00) (18 - 20)  SpO2: 95% (:00) (95% - 97%)    Parameters below as of 2025 05:00  Patient On (Oxygen Delivery Method): nasal cannula  O2 Flow (L/min): 4      I&O's Summary    2025 07:01  -  2025 07:00  --------------------------------------------------------  IN: 120 mL / OUT: 1400 mL / NET: -1280 mL          PHYSICAL EXAM:  TELE: not on tele  Constitutional: NAD, awake and alert, well-developed  HEENT: Moist Mucous Membranes, Anicteric  Pulmonary: Non-labored, breath sounds are DIM  Cardiovascular: Regular, S1 and S2 nl, No murmurs, rubs, gallops or clicks  Gastrointestinal: Bowel Sounds present, soft, nontender.   Lymph:+ peripheral edema.  Skin: No visible rashes or ulcers.  Psych:  Mood & affect appropriate    LABS: All Labs Reviewed:                        7.8    9.83  )-----------( 268      ( 2025 06:55 )             23.8                         8.5    11.98 )-----------( 323      ( 2025 07:50 )             26.2                         8.1    9.69  )-----------( 330      ( 2025 09:25 )             25.4     2025 06:55    144    |  105    |  30     ----------------------------<  115    3.7     |  33     |  0.94   2025 07:50    144    |  104    |  29     ----------------------------<  129    3.8     |  34     |  0.97   2025 09:25    142    |  106    |  35     ----------------------------<  142    4.3     |  30     |  1.20     Ca    8.5        2025 06:55  Ca    9.0        2025 07:50  Ca    8.5        2025 09:25  Phos  2.6       2025 06:55  Phos  2.3       2025 07:50  Phos  2.7       2025 09:25  Mg     1.9       2025 06:55  Mg     1.3       2025 07:50  Mg     1.7       2025 09:25    TPro  5.5    /  Alb  2.7    /  TBili  0.7    /  DBili  x      /  AST  13     /  ALT  18     /  AlkPhos  38     2025 06:55  TPro  6.8    /  Alb  3.0    /  TBili  1.0    /  DBili  x      /  AST  <3     /  ALT  23     /  AlkPhos  46     2025 07:50  TPro  6.4    /  Alb  2.8    /  TBili  0.7    /  DBili  x      /  AST  5      /  ALT  21     /  AlkPhos  47     2025 09:25    PT/INR - ( 2025 07:50 )   PT: 15.9 sec;   INR: 1.36 ratio           CARDIAC MARKERS ( 2025 06:55 )  x     / x     / x     / x     / <1.0 ng/mL         EC Lead ECG:   Ventricular Rate 88 BPM    Atrial Rate 88 BPM    P-R Interval 130 ms    QRS Duration 78 ms    Q-T Interval 370 ms    QTC Calculation(Bazett) 447 ms    P Axis 79 degrees    R Axis -48 degrees    T Axis 78 degrees    Diagnosis Line Sinus rhythm with premature atrial complexes  Left anterior fascicular block  Possible Lateral infarct , age undetermined (25 @ 04:43)      TRANSTHORACIC ECHOCARDIOGRAM REPORT  ________________________________________________________________________________                                      _______       Pt. Name:       MARTINEZ JONES Study Date:    2025  MRN:            [id number]        YOB: 1940  Accession #:    [id number]       Age:           84 years  Account#:       [id number]      Gender:        F  Heart Rate:                     Height:        62.99 in (160.00 cm)  Rhythm:                         Weight:        147.71 lb (67.00 kg)  Blood Pressure: 109/63 mmHg     BSA/BMI:       1.70 m² / 26.17 kg/m²  ________________________________________________________________________________________  Referring Physician:    [id number] Naresh Vitale  Interpreting Physician: Daniel Jorge M.D.  Primary Sonographer:    Jamal Hayes    CPT:               ECHO TTE WO CON COMP W DOPP - 03435.m  Indication(s):     Shock, unspecified - R57.9  Procedure:         Transthoracic echocardiogram with 2-D, M-mode and complete                     spectral and color flow Doppler.  Ordering Location: ICU1  Admission Status:  Inpatient  Study Information: Image quality for this study is technically difficult.    _______________________________________________________________________________________     CONCLUSIONS:      1. Technically difficult image quality.   2. Left ventricular systolic function is normal with an ejection fraction of 67 % by Arias's method of disks.   3. Mild left ventricular hypertrophy.   4.Normal right ventricular cavity size and probably normal right ventricular systolic function.   5. Tricuspid aortic valve with normal leaflet excursion. There is calcification of the aortic valve leaflets.   6. Moderate mitral valve leaflet calcification.   7. Mild mitral regurgitation.   8. Moderate tricuspid regurgitation.   9. The inferior vena cava is normal in size measuring 1.64 cm in diameter, (normal <2.1cm) with normal inspiratory collapse (normal >50%) consistent with normal right atrial pressure (~3, range 0-5mmHg).  10. Estimated pulmonary artery systolic pressure is 57 mmHg, consistent with moderate-to-severe pulmonary hypertension.  11. Trace pericardial effusion.  12. Right pleural effusion noted.  13. Compared to the transthoracic echocardiogram performed on 2025, there have been no significant interval changes.    ________________________________________________________________________________________  FINDINGS:     Left Ventricle:  Left ventricular systolic function is normal with a calculated ejection fraction of 67 % by the Arias's biplane method of disks. Mild left ventricular hypertrophy.     Right Ventricle:  The right ventricular cavity is normal in size and right ventricular systolic function is probablynormal. Tricuspid annular plane systolic excursion (TAPSE) is 1.9 cm (normal >=1.7 cm).     Left Atrium:  The left atrium is normal in size with an indexed volume of 29.06 ml/m².     Right Atrium:  The right atrium is normal in size with an indexed volume of 26.00 ml/m² and an indexed area of 9.41 cm²/m².     Interatrial Septum:  The interatrial septum appears intact.     Aortic Valve:  The aortic valve is tricuspid with normal leaflet excursion. There is calcification of the aortic valve leaflets. There is mild thickening of the aortic valve leaflets. There is trace aortic regurgitation.     Mitral Valve:  Structurally normal mitral valve with normal leaflet excursion. There is calcification of the mitral valve annulus. There is moderate leaflet calcification. There is mild mitral regurgitation.     Tricuspid Valve:  The tricuspid valve is structurally normal with normal leaflet excursion. There is moderate tricuspid regurgitation. Estimated pulmonary artery systolic pressure is 57 mmHg,consistent with moderate-to-severe pulmonary hypertension.     Pulmonic Valve:  The pulmonic valve was not well visualized. There is trace pulmonic regurgitation.     Aorta:  The aortic root at the sinuses of Valsalva is normal in size, measuring 3.30 cm (indexed 1.94 cm/m²). The ascending aorta is normal in size, measuring 3.00 cm (indexed 1.76 cm/m²). The aortic arch diameter is normal in size, measuring 2.4 cm (indexed 1.41 cm/m²).     Pericardium:  There is a trace pericardial effusion.     Pleura:  Right pleural effusion noted.     Systemic Veins:  The inferior vena cava is normal in size measuring 1.64 cm in diameter, (normal <2.1cm) with normal inspiratory collapse (normal >50%) consistent with normal right atrial pressure (~3, range 0-5mmHg).  ____________________________________________________________________  QUANTITATIVE DATA:  Left Ventricle Measurements: (Indexed to BSA)     IVSd (2D):   1.2 cm  LVPWd (2D):  1.1 cm  LVIDd (2D):  4.2 cm  LVIDs (2D):  2.4 cm  LV Mass:     169 g  99.5 g/m²  LV Vol d, MOD A2C: 38.9 ml 22.88 ml/m²  LV Vol d, MOD A4C: 40.9 ml 24.06 ml/m²  LV Vol d, MOD BP:  40.9 ml 24.04 ml/m²  LV Vol s, MOD A2C: 11.3 ml 6.65 ml/m²  LV Vol s, MOD A4C: 13.4 ml 7.88 ml/m²  LV Vol s, MOD BP:  13.4 ml 7.91 ml/m²  LVOT SV MOD BP:    27.4 ml  LV EF% MOD BP:     67 %     MV E Vmax:    1.54 m/s  MV A Vmax:    0.74 m/s  MV E/A:       2.08  e' lateral:   11.90 cm/s  e' medial:    8.81 cm/s  E/e' lateral: 12.94  E/e' medial:  17.48  E/e' Average: 14.87  MV DT:272 msec    Aorta Measurements: (Normal range) (Indexed to BSA)     Ao Root d     3.30 cm (2.7 - 3.3 cm) 1.94 cm/m²  Ao Asc d, 2D: 3.00  Ao Asc prox:  3.00 cm                1.76 cm/m²  Ao Arch:      2.4 cm            Left Atrium Measurements: (Indexed to BSA)  LA Diam 2D: 3.60 cm         Right Ventricle Measurements: Right Atrial Measurements:     TAPSE:            1.9 cm      RA Vol s, MOD A4C         44.2 ml  RV Base (RVID1):  2.9 cm      RA Vol s, MOD A4C i BSA   26.00 ml/m²  RV Mid (RVID2): 2.5 cm      RA Area s, MOD A4C        16.0 cm²  RV Major (RVID3): 6.4 cm      RA Area s, MOD A4C, i BSA 9.41 cm²/m²       LVOT / RVOT/ Qp/Qs Data: (Indexed to BSA)  LVOT Diameter,s: 2.20 cm  LVOT Area:       3.80 cm²    Mitral Valve Measurements:     MV E Vmax: 1.5 m/s         MR Vmax:          3.80 m/s  MV A Vmax: 0.7 m/s         MR Peak Gradient: 57.8 mmHg  MV E/A:    2.1       Tricuspid Valve Measurements:     TR Vmax:          3.7 m/s  TR Peak Gradient: 54.5 mmHg  RA Pressure:      3 mmHg  PASP:             57 mmHg    ________________________________________________________________________________________  Electronically signed on 2025 at 11:19:51 AM by Daniel Jorge M.D.         *** Final ***      Radiology:

## 2025-06-20 NOTE — PROGRESS NOTE ADULT - PROBLEM SELECTOR PLAN 1
Patient found to have hemoptysis  - Will discontinue heparin gtt  - Bronch on 06/16  - Heme onc following  - ID following  - Pulm following post bronch - neg cult  - Hemoptysis is likely resolving Patient found to have hemoptysis  - Will discontinue heparin gtt  - Bronch on 06/16-- showed pooling of blood in RUL, no lesions noted   - Heme onc following  - ID following  - Pulm following post bronch - neg cult  - Hemoptysis is likely resolving

## 2025-06-20 NOTE — PROGRESS NOTE ADULT - PROBLEM SELECTOR PLAN 5
-likely in the setting of sepsis   -on home full AC with Eliquis  - 6/12 heparin gtt started  - 6/13 : heparin gtt now discontinued 2/2 hemoptysis -likely in the setting of sepsis   -on home full AC with Eliquis  - 6/12 heparin gtt started  - 6/13 : heparin gtt now discontinued 2/2 hemoptysis  - restarted Eliquis on 06/20

## 2025-06-20 NOTE — PROGRESS NOTE ADULT - ASSESSMENT
84F h/o AFib on Eliquis, COPD, CKD, aplastic anemia, polymyalgia rheumatica on chronic steroids, avascular necrosis of hips, HTN, HLD, with recent admission to [facility] 5/26 - 6/5 for acute HFpEF exacerbation and COPD exacerbation, discharged to rehab on 6/5 and returning today with right sided chest pain, general malaise and feeling unwell. She reports some slight cough though otherwise no other new symptoms reported. Found to be hypotensive, febrile w/ leukocytosis. Admitted to ICU for septic shock likely 2/2 to PNA and KIMBERLY. Now stable for downgrade to tele.

## 2025-06-21 LAB
BASOPHILS # BLD AUTO: 0.08 K/UL — SIGNIFICANT CHANGE UP (ref 0–0.2)
BASOPHILS NFR BLD AUTO: 2.1 % — HIGH (ref 0–2)
EOSINOPHIL # BLD AUTO: 0.09 K/UL — SIGNIFICANT CHANGE UP (ref 0–0.5)
EOSINOPHIL NFR BLD AUTO: 2.4 % — SIGNIFICANT CHANGE UP (ref 0–6)
GLUCOSE BLDC GLUCOMTR-MCNC: 118 MG/DL — HIGH (ref 70–99)
GLUCOSE BLDC GLUCOMTR-MCNC: 119 MG/DL — HIGH (ref 70–99)
GLUCOSE BLDC GLUCOMTR-MCNC: 129 MG/DL — HIGH (ref 70–99)
GLUCOSE BLDC GLUCOMTR-MCNC: 137 MG/DL — HIGH (ref 70–99)
HCT VFR BLD CALC: 29 % — LOW (ref 34.5–45)
HGB BLD-MCNC: 9.3 G/DL — LOW (ref 11.5–15.5)
IMM GRANULOCYTES # BLD AUTO: 0.07 K/UL — SIGNIFICANT CHANGE UP (ref 0–0.07)
IMM GRANULOCYTES NFR BLD AUTO: 1.8 % — HIGH (ref 0–0.9)
LYMPHOCYTES # BLD AUTO: 0.1 K/UL — LOW (ref 1–3.3)
LYMPHOCYTES NFR BLD AUTO: 2.6 % — LOW (ref 13–44)
MCHC RBC-ENTMCNC: 31.1 PG — SIGNIFICANT CHANGE UP (ref 27–34)
MCHC RBC-ENTMCNC: 32.1 G/DL — SIGNIFICANT CHANGE UP (ref 32–36)
MCV RBC AUTO: 97 FL — SIGNIFICANT CHANGE UP (ref 80–100)
MONOCYTES # BLD AUTO: 0.4 K/UL — SIGNIFICANT CHANGE UP (ref 0–0.9)
MONOCYTES NFR BLD AUTO: 10.5 % — SIGNIFICANT CHANGE UP (ref 2–14)
NEUTROPHILS # BLD AUTO: 3.08 K/UL — SIGNIFICANT CHANGE UP (ref 1.8–7.4)
NEUTROPHILS NFR BLD AUTO: 80.6 % — HIGH (ref 43–77)
NRBC # BLD AUTO: 0.22 K/UL — HIGH (ref 0–0)
NRBC # FLD: 0.22 K/UL — HIGH (ref 0–0)
NRBC BLD AUTO-RTO: 6 /100 WBCS — HIGH (ref 0–0)
PLATELET # BLD AUTO: 707 K/UL — HIGH (ref 150–400)
PMV BLD: 12.8 FL — SIGNIFICANT CHANGE UP (ref 7–13)
RBC # BLD: 2.99 M/UL — LOW (ref 3.8–5.2)
RBC # FLD: 20.9 % — HIGH (ref 10.3–14.5)
WBC # BLD: 3.82 K/UL — SIGNIFICANT CHANGE UP (ref 3.8–10.5)
WBC # FLD AUTO: 3.82 K/UL — SIGNIFICANT CHANGE UP (ref 3.8–10.5)

## 2025-06-21 PROCEDURE — 99232 SBSQ HOSP IP/OBS MODERATE 35: CPT

## 2025-06-21 PROCEDURE — 99233 SBSQ HOSP IP/OBS HIGH 50: CPT | Mod: GC

## 2025-06-21 RX ADMIN — GABAPENTIN 400 MILLIGRAM(S): 400 CAPSULE ORAL at 17:20

## 2025-06-21 RX ADMIN — GABAPENTIN 400 MILLIGRAM(S): 400 CAPSULE ORAL at 05:17

## 2025-06-21 RX ADMIN — Medication 40 MILLIGRAM(S): at 09:10

## 2025-06-21 RX ADMIN — ATORVASTATIN CALCIUM 10 MILLIGRAM(S): 80 TABLET, FILM COATED ORAL at 21:08

## 2025-06-21 RX ADMIN — AMIODARONE HYDROCHLORIDE 100 MILLIGRAM(S): 50 INJECTION, SOLUTION INTRAVENOUS at 05:17

## 2025-06-21 RX ADMIN — Medication 1 DOSE(S): at 07:29

## 2025-06-21 RX ADMIN — IPRATROPIUM BROMIDE AND ALBUTEROL SULFATE 3 MILLILITER(S): .5; 2.5 SOLUTION RESPIRATORY (INHALATION) at 00:55

## 2025-06-21 RX ADMIN — IPRATROPIUM BROMIDE AND ALBUTEROL SULFATE 3 MILLILITER(S): .5; 2.5 SOLUTION RESPIRATORY (INHALATION) at 19:43

## 2025-06-21 RX ADMIN — APIXABAN 2.5 MILLIGRAM(S): 2.5 TABLET, FILM COATED ORAL at 21:08

## 2025-06-21 RX ADMIN — Medication 1 DOSE(S): at 21:08

## 2025-06-21 RX ADMIN — PREDNISONE 5 MILLIGRAM(S): 20 TABLET ORAL at 05:17

## 2025-06-21 RX ADMIN — IPRATROPIUM BROMIDE AND ALBUTEROL SULFATE 3 MILLILITER(S): .5; 2.5 SOLUTION RESPIRATORY (INHALATION) at 07:16

## 2025-06-21 RX ADMIN — APIXABAN 2.5 MILLIGRAM(S): 2.5 TABLET, FILM COATED ORAL at 09:10

## 2025-06-21 RX ADMIN — IPRATROPIUM BROMIDE AND ALBUTEROL SULFATE 3 MILLILITER(S): .5; 2.5 SOLUTION RESPIRATORY (INHALATION) at 13:17

## 2025-06-21 RX ADMIN — TORSEMIDE 20 MILLIGRAM(S): 20 TABLET ORAL at 05:17

## 2025-06-21 RX ADMIN — Medication 1 APPLICATION(S): at 05:20

## 2025-06-21 NOTE — PROGRESS NOTE ADULT - ASSESSMENT
84 female PMH of A-fib on Eliquis, COPD, CKD, aplastic anemia, polymyalgia rheumatica, on chronic steroids, avascular necrosis of hips, HLD, HTN, DM, recent admission to [facility] 5/26 through 6/5/2025 for CHF/fluid overload/COPD exacerbation, presents to the ED form [facility] for evaluation of fever and generalized feeling of being unwell, found to be septic and hypotensive.     Septic shock likely 2/2 to PNA and KIMBERLY.  - s/p ICU and pressor  - CT chest 6/10 with multifocal R sided PNA  - Pulm following.  Initiate incentive spirometer  - s/p bronch, 6/16.  Showed blood oozing from RUL but no lesion.  Still with minimal hemptysis  - remains on NC  -fu ID     - c/o atypical CP Appears pleuritic  - EKG x 2 showed NSR with PAC, non-ischemic.  Troponin negative   - Back on home statin for Hx HLD    - No significant sign fluid overload on exam but with trace edema  - Back on home Torsemide 20 mg daily. 6/19 given extra dose  -FU am labs if cr stable for lasix 40mg IV x 1    - Echo 6/12/25 as above EF 67% mild LVH, mod MAC, mild MR, mod to severe pHTN    - Bp remains stable    - Now off Midodrine and Northera    - Hx of paroxysmal atrial fibrillation and DVT/PE  - Continue to hold home Eliquis/heparin gtt for persistent hemoptysis (from Afib standpoint)  - Resume Heparin gtt when cleared by Heme, remains anemic fu am CBC   - Continue Amiodarone 100 mg daily    - Monitor and replete electrolytes. Keep K>4.0 and Mg>2.0.  - Will continue to follow   84 female PMH of A-fib on Eliquis, COPD, CKD, aplastic anemia, polymyalgia rheumatica, on chronic steroids, avascular necrosis of hips, HLD, HTN, DM, recent admission to [facility] 5/26 through 6/5/2025 for CHF/fluid overload/COPD exacerbation, presents to the ED form [facility] for evaluation of fever and generalized feeling of being unwell, found to be septic and hypotensive.     Septic shock likely 2/2 to PNA and KIMBERLY.  - s/p ICU and pressor  - CT chest 6/10 with multifocal R sided PNA  - Pulm following.  Initiate incentive spirometer  - s/p bronch, 6/16.  Showed blood oozing from RUL but no lesion.  Still with minimal hemoptysis  - remains on NC  -fu ID     - c/o atypical CP Appears pleuritic  - EKG x 2 showed NSR with PAC, non-ischemic.  Troponin negative   - Back on home statin for Hx HLD    - No significant sign fluid overload on exam but with trace edema  - Back on home Torsemide 20 mg daily. 6/19 given extra dose  -FU am labs if cr stable for lasix 40mg IV x 1    - Echo 6/12/25 as above EF 67% mild LVH, mod MAC, mild MR, mod to severe pHTN    - Bp remains stable    - Now off Midodrine and Northera    - Hx of paroxysmal atrial fibrillation and DVT/PE  - restarted on ac with eliquis yesterday. monitor counts closely  - Continue Amiodarone 100 mg daily    - Monitor and replete electrolytes. Keep K>4.0 and Mg>2.0.  - Will continue to follow

## 2025-06-21 NOTE — PROGRESS NOTE ADULT - SUBJECTIVE AND OBJECTIVE BOX
CHIEF COMPLAINT/ REASON FOR VISIT  .. Patient was seen to address the  issue listed under PROBLEM LIST which is located toward bottom of this note     [facility]ARTINE[facility] PATEL TELE 306 W1    Allergies    No Known Allergies    Intolerances        PAST MEDICAL & SURGICAL HISTORY:  COPD (chronic obstructive pulmonary disease)      DM (diabetes mellitus)  diet-controlled      PAF (paroxysmal atrial fibrillation)  s/p ablation      Hiatal hernia      H/O aplastic anemia      History of IBS      H/O osteoporosis      HLD (hyperlipidemia)      PMR (polymyalgia rheumatica)      History of basal cell cancer      Chronic kidney disease (CKD)      Hypotension      Presence of IVC filter      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      History of immunodeficiency      Lumbar compression fracture      H/O hiatal hernia      H/O pulmonary fibrosis      H/O sinus bradycardia      History of fracture of right hip  "unoperable"      S/P hysterectomy      S/P foot surgery      S/P knee surgery      After cataract  bilateral eye cataract surgically removed      Closed right hip fracture  rigth hip ORIF july 19 2016      S/P IVC filter  nov 2016      S/P carpal tunnel release  Right 2008 & 6/27/17      H/O kyphoplasty      Port-A-Cath in place  right chest          FAMILY HISTORY:  Family history of bladder cancer (Mother)    Family history of myocardial infarction (Father)    FH: atrial fibrillation (Father)        Home Medications:  amiodarone 200 mg oral tablet: 0.5 tab(s) orally once a day (08 Jun 2025 16:43)  atorvastatin 10 mg oral tablet: 1 tab(s) orally once a day (at bedtime) (20 Jun 2025 15:16)  Bentyl 10 mg oral capsule: 1 cap(s) orally 2 times a day, As Needed (08 Jun 2025 16:43)  Eliquis 2.5 mg oral tablet: 1 tab(s) orally 2 times a day (08 Jun 2025 16:43)  fluticasone-salmeterol 500 mcg-50 mcg/inh inhalation powder: 1 puff(s) inhaled 2 times a day (08 Jun 2025 16:43)  folic acid 1 mg oral tablet: 1 tab(s) orally once a day (in the morning) (08 Jun 2025 16:43)  gabapentin 400 mg oral capsule: 1 cap(s) orally 2 times a day (08 Jun 2025 16:43)  ipratropium-albuterol 0.5 mg-2.5 mg/3 mL inhalation solution: 3 milliliter(s) inhaled every 6 hours As needed Shortness of Breath and/or Wheezing (08 Jun 2025 16:43)  IVIG monthly:  (08 Jun 2025 16:43)  leflunomide 10 mg oral tablet: 1 tab(s) orally once a day (08 Jun 2025 16:43)  midodrine 10 mg oral tablet: 1 tab(s) orally every 8 hours (08 Jun 2025 16:43)  montelukast 10 mg oral tablet: 1 tab(s) orally once a day (08 Jun 2025 16:43)  pantoprazole 40 mg oral delayed release tablet: 1 tab(s) orally once a day (before a meal) (08 Jun 2025 16:43)  predniSONE 5 mg oral tablet: 1 tab(s) orally once a day (08 Jun 2025 16:43)  Procrit 10,000 units/mL preservative-free injectable solution: injectable once a week WEDNESDAY (08 Jun 2025 16:43)  Reclast Infusion , IV x 1 yearly:  (08 Jun 2025 16:43)  simvastatin 10 mg oral tablet: 1 tab(s) orally once a day (at bedtime) (08 Jun 2025 16:43)  tiotropium 2.5 mcg/inh inhalation aerosol: 2 puff(s) inhaled once a day (08 Jun 2025 16:43)  tiZANidine: 2 tab(s) orally once a day (at bedtime) as needed for  muscle spasm (08 Jun 2025 16:43)  torsemide 20 mg oral tablet: 1 tab(s) orally once a day (in the morning) (08 Jun 2025 16:49)      MEDICATIONS  (STANDING):  albuterol/ipratropium for Nebulization 3 milliLiter(s) Nebulizer every 6 hours  aMIOdarone    Tablet 100 milliGRAM(s) Oral daily  apixaban 2.5 milliGRAM(s) Oral two times a day  atorvastatin 10 milliGRAM(s) Oral at bedtime  chlorhexidine 2% Cloths 1 Application(s) Topical <User Schedule>  dextrose 50% Injectable 12.5 Gram(s) IV Push once  dextrose 50% Injectable 25 Gram(s) IV Push once  dextrose 50% Injectable 25 Gram(s) IV Push once  fluticasone propionate/ salmeterol 500-50 MICROgram(s) Diskus 1 Dose(s) Inhalation two times a day  gabapentin 400 milliGRAM(s) Oral every 12 hours  insulin lispro (ADMELOG) corrective regimen sliding scale   SubCutaneous three times a day before meals  insulin lispro (ADMELOG) corrective regimen sliding scale   SubCutaneous at bedtime  pantoprazole  Injectable 40 milliGRAM(s) IV Push daily  predniSONE   Tablet 5 milliGRAM(s) Oral daily  torsemide 20 milliGRAM(s) Oral daily    MEDICATIONS  (PRN):  acetaminophen     Tablet .. 650 milliGRAM(s) Oral every 6 hours PRN Mild Pain (1 - 3)  dextrose Oral Gel 15 Gram(s) Oral once PRN Blood Glucose LESS THAN 70 milliGRAM(s)/deciliter      Diet, Regular:   DASH/TLC Sodium & Cholesterol Restricted (06-16-25 @ 21:27) [Active]          Vital Signs Last 24 Hrs  T(C): 36.7 (21 Jun 2025 05:00), Max: 36.8 (20 Jun 2025 11:29)  T(F): 98.1 (21 Jun 2025 05:00), Max: 98.3 (20 Jun 2025 11:29)  HR: 82 (21 Jun 2025 07:36) (80 - 88)  BP: 135/61 (21 Jun 2025 05:00) (127/71 - 135/61)  BP(mean): --  RR: 18 (21 Jun 2025 05:00) (18 - 19)  SpO2: 96% (21 Jun 2025 07:36) (95% - 96%)    Parameters below as of 21 Jun 2025 07:36  Patient On (Oxygen Delivery Method): nasal cannula w/ humidification, 5 lpm          06-20-25 @ 07:01  -  06-21-25 @ 07:00  --------------------------------------------------------  IN: 0 mL / OUT: 700 mL / NET: -700 mL              LABS:                        9.2    8.70  )-----------( 277      ( 20 Jun 2025 08:15 )             27.7     06-20    144  |  105  |  32[H]  ----------------------------<  129[H]  3.8   |  37[H]  |  0.85    Ca    9.0      20 Jun 2025 08:15  Phos  2.9     06-20  Mg     1.4     06-20    TPro  6.1  /  Alb  3.1[L]  /  TBili  0.8  /  DBili  x   /  AST  6[L]  /  ALT  17  /  AlkPhos  54  06-20      Urinalysis Basic - ( 20 Jun 2025 08:15 )    Color: x / Appearance: x / SG: x / pH: x  Gluc: 129 mg/dL / Ketone: x  / Bili: x / Urobili: x   Blood: x / Protein: x / Nitrite: x   Leuk Esterase: x / RBC: x / WBC x   Sq Epi: x / Non Sq Epi: x / Bacteria: x        ABG - ( 20 Jun 2025 16:00 )  pH, Arterial: 7.36  pH, Blood: x     /  pCO2: 71    /  pO2: 65    / HCO3: 40    / Base Excess: 14.7  /  SaO2: 95.2                WBC:  WBC Count: 8.70 K/uL (06-20 @ 08:15)  WBC Count: 9.83 K/uL (06-19 @ 06:55)  WBC Count: 11.98 K/uL (06-18 @ 07:50)  WBC Count: 9.69 K/uL (06-17 @ 09:25)      MICROBIOLOGY:  RECENT CULTURES:  06-16 Bronch Wash Bronchial Wash XXXX   Rare Squamous epithelial cells seen per low power field  No polymorphonuclear leukocytes seen per low power field  No organisms seen per oil power field   Commensal marilee consistent with body site    06-16 Bronch Wash Bronchial Wash XXXX XXXX   Culture is being performed.    06-16 Bronch Wash Bronchial Wash XXXX   Rare polymorphonuclear leukocytes per oil power field  No Squamous epithelial cells per oil power field  No organisms seen per oil power field   Commensal marilee consistent with body site                    Sodium:  Sodium: 144 mmol/L (06-20 @ 08:15)  Sodium: 144 mmol/L (06-19 @ 06:55)  Sodium: 144 mmol/L (06-18 @ 07:50)  Sodium: 142 mmol/L (06-17 @ 09:25)      0.85 mg/dL 06-20 @ 08:15  0.94 mg/dL 06-19 @ 06:55  0.97 mg/dL 06-18 @ 07:50  1.20 mg/dL 06-17 @ 09:25      Hemoglobin:  Hemoglobin: 9.2 g/dL (06-20 @ 08:15)  Hemoglobin: 7.8 g/dL (06-19 @ 06:55)  Hemoglobin: 8.5 g/dL (06-18 @ 07:50)  Hemoglobin: 8.1 g/dL (06-17 @ 09:25)      Platelets: Platelet Count - Automated: 277 K/uL (06-20 @ 08:15)  Platelet Count - Automated: 268 K/uL (06-19 @ 06:55)  Platelet Count - Automated: 323 K/uL (06-18 @ 07:50)  Platelet Count - Automated: 330 K/uL (06-17 @ 09:25)      LIVER FUNCTIONS - ( 20 Jun 2025 08:15 )  Alb: 3.1 g/dL / Pro: 6.1 g/dL / ALK PHOS: 54 U/L / ALT: 17 U/L / AST: 6 U/L / GGT: x             Urinalysis Basic - ( 20 Jun 2025 08:15 )    Color: x / Appearance: x / SG: x / pH: x  Gluc: 129 mg/dL / Ketone: x  / Bili: x / Urobili: x   Blood: x / Protein: x / Nitrite: x   Leuk Esterase: x / RBC: x / WBC x   Sq Epi: x / Non Sq Epi: x / Bacteria: x        RADIOLOGY & ADDITIONAL STUDIES:      MICROBIOLOGY:  RECENT CULTURES:  06-16 Bronch Wash Bronchial Wash XXXX   Rare Squamous epithelial cells seen per low power field  No polymorphonuclear leukocytes seen per low power field  No organisms seen per oil power field   Commensal marilee consistent with body site    06-16 Bronch Wash Bronchial Wash XXXX XXXX   Culture is being performed.    06-16 Bronch Wash Bronchial Wash XXXX   Rare polymorphonuclear leukocytes per oil power field  No Squamous epithelial cells per oil power field  No organisms seen per oil power field   Commensal marilee consistent with body site

## 2025-06-21 NOTE — PROGRESS NOTE ADULT - PROBLEM SELECTOR PLAN 1
Patient found to have hemoptysis  - Bronch on 06/16-- showed pooling of blood in RUL, no lesions noted   - Heme onc following  - ID following  - Pulm following post bronch - neg cult  - Hemoptysis is likely resolving Patient found to have hemoptysis possible sec to full ac and cough   - Bronch on 06/16-- showed pooling of blood in RUL, no lesions noted   - Heme onc following dr montano   - Pulm  dr palm following post bronch - neg cult  - Hemoptysis resolved - resumed on Eliquis

## 2025-06-21 NOTE — PROGRESS NOTE ADULT - PROBLEM SELECTOR PLAN 3
- The patient is noted to have blood-tinged sputum  - requiring PRBC transfusions ~8 weeks (via Rt subclavian mediport)  - Hgb 7.6 (baseline appears to be ~8)   - transfuse <7-7.5  - 6/12 heparin gtt started  - 6/13 : heparin gtt now discontinued 2/2 hemoptysis  - S/p 2 unit PRBC- will give lasix 40mg IVP x1 in between units -- good response  - ordered 1 unit pRBC on 06/19-- improved hgb   - restarted eliquis 2.5mg BID yesterday- montior hgb/platelets - The patient is noted to have blood-tinged sputum  - requiring PRBC transfusions ~8 weeks (via Rt subclavian mediport)  - Hgb 7.6 (baseline appears to be ~8)   - transfuse <7-7.5  - 6/12 heparin gtt started  - 6/13 : heparin gtt now discontinued 2/2 hemoptysis  - S/p 2 unit PRBC- will give lasix 40mg IVP x1 in between units -- good response  - ordered 1 unit pRBC on 06/19-- improved hgb   - restarted eliquis 2.5mg BID - montior hgb/platelets

## 2025-06-21 NOTE — PROGRESS NOTE ADULT - PROBLEM SELECTOR PLAN 9
chronic rate controlled   monitor sputum production ( blood tinged today )   c/w amiodarone 100mg qd.  - 6/12 heparin gtt started  - 6/13 : heparin gtt now discontinued 2/2 hemoptysis  - resumed eliquis yesterday, monitor Hgb, platelets

## 2025-06-21 NOTE — PROGRESS NOTE ADULT - SUBJECTIVE AND OBJECTIVE BOX
[facility] -- Sai Valle Pannella, Patel, Savella Goodger, Cohen  Office # [phone]      Follow Up:  Hypotension, aifb     Subjective/Observations:   No events overnight resting comfortably in bed.  No complaints of chest pain,  palpitations reported. No signs of orthopnea or PND.  remains on NC, is on home o2   + shortness of breath     REVIEW OF SYSTEMS: All other review of systems is negative unless indicated above    PAST MEDICAL & SURGICAL HISTORY:  COPD (chronic obstructive pulmonary disease)      DM (diabetes mellitus)  diet-controlled      PAF (paroxysmal atrial fibrillation)  s/p ablation      Hiatal hernia      H/O aplastic anemia      History of IBS      H/O osteoporosis      HLD (hyperlipidemia)      PMR (polymyalgia rheumatica)      History of basal cell cancer      Chronic kidney disease (CKD)      Hypotension      Presence of IVC filter      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      History of immunodeficiency      Lumbar compression fracture      H/O hiatal hernia      H/O pulmonary fibrosis      H/O sinus bradycardia      History of fracture of right hip  "unoperable"      S/P hysterectomy      S/P foot surgery      S/P knee surgery      After cataract  bilateral eye cataract surgically removed      Closed right hip fracture  rigth hip ORIF 2016      S/P IVC filter  2016      S/P carpal tunnel release  Right  & 17      H/O kyphoplasty      Port-A-Cath in place  right chest          MEDICATIONS  (STANDING):  albuterol/ipratropium for Nebulization 3 milliLiter(s) Nebulizer every 6 hours  aMIOdarone    Tablet 100 milliGRAM(s) Oral daily  atorvastatin 10 milliGRAM(s) Oral at bedtime  chlorhexidine 2% Cloths 1 Application(s) Topical <User Schedule>  dextrose 50% Injectable 25 Gram(s) IV Push once  dextrose 50% Injectable 12.5 Gram(s) IV Push once  dextrose 50% Injectable 25 Gram(s) IV Push once  fluticasone propionate/ salmeterol 500-50 MICROgram(s) Diskus 1 Dose(s) Inhalation two times a day  gabapentin 400 milliGRAM(s) Oral every 12 hours  insulin lispro (ADMELOG) corrective regimen sliding scale   SubCutaneous three times a day before meals  insulin lispro (ADMELOG) corrective regimen sliding scale   SubCutaneous at bedtime  pantoprazole  Injectable 40 milliGRAM(s) IV Push daily  predniSONE   Tablet 5 milliGRAM(s) Oral daily  torsemide 20 milliGRAM(s) Oral daily    MEDICATIONS  (PRN):  acetaminophen     Tablet .. 650 milliGRAM(s) Oral every 6 hours PRN Mild Pain (1 - 3)  dextrose Oral Gel 15 Gram(s) Oral once PRN Blood Glucose LESS THAN 70 milliGRAM(s)/deciliter      Allergies    No Known Allergies    Intolerances        Vital Signs Last 24 Hrs  T(C): 36.6 (2025 05:00), Max: 37.1 (2025 20:08)  T(F): 97.8 (2025 05:00), Max: 98.8 (2025 20:08)  HR: 85 (2025 05:00) (82 - 93)  BP: 117/69 (2025 05:00) (117/69 - 130/69)  BP(mean): --  RR: 18 (2025 05:00) (18 - 20)  SpO2: 95% (2025 05:00) (95% - 97%)    Parameters below as of 2025 05:00  Patient On (Oxygen Delivery Method): nasal cannula  O2 Flow (L/min): 4      I&O's Summary    2025 07:01  -  2025 07:00  --------------------------------------------------------  IN: 120 mL / OUT: 1400 mL / NET: -1280 mL          PHYSICAL EXAM:  TELE: not on tele  Constitutional: NAD, awake and alert, well-developed  HEENT: Moist Mucous Membranes, Anicteric  Pulmonary: Non-labored, breath sounds are DIM  Cardiovascular: Regular, S1 and S2 nl, No murmurs, rubs, gallops or clicks  Gastrointestinal: Bowel Sounds present, soft, nontender.   Lymph:+ peripheral edema.  Skin: No visible rashes or ulcers.  Psych:  Mood & affect appropriate    LABS: All Labs Reviewed:                        7.8    9.83  )-----------( 268      ( 2025 06:55 )             23.8                         8.5    11.98 )-----------( 323      ( 2025 07:50 )             26.2                         8.1    9.69  )-----------( 330      ( 2025 09:25 )             25.4     2025 06:55    144    |  105    |  30     ----------------------------<  115    3.7     |  33     |  0.94   2025 07:50    144    |  104    |  29     ----------------------------<  129    3.8     |  34     |  0.97   2025 09:25    142    |  106    |  35     ----------------------------<  142    4.3     |  30     |  1.20     Ca    8.5        2025 06:55  Ca    9.0        2025 07:50  Ca    8.5        2025 09:25  Phos  2.6       2025 06:55  Phos  2.3       2025 07:50  Phos  2.7       2025 09:25  Mg     1.9       2025 06:55  Mg     1.3       2025 07:50  Mg     1.7       2025 09:25    TPro  5.5    /  Alb  2.7    /  TBili  0.7    /  DBili  x      /  AST  13     /  ALT  18     /  AlkPhos  38     2025 06:55  TPro  6.8    /  Alb  3.0    /  TBili  1.0    /  DBili  x      /  AST  <3     /  ALT  23     /  AlkPhos  46     2025 07:50  TPro  6.4    /  Alb  2.8    /  TBili  0.7    /  DBili  x      /  AST  5      /  ALT  21     /  AlkPhos  47     2025 09:25    PT/INR - ( 2025 07:50 )   PT: 15.9 sec;   INR: 1.36 ratio           CARDIAC MARKERS ( 2025 06:55 )  x     / x     / x     / x     / <1.0 ng/mL         EC Lead ECG:   Ventricular Rate 88 BPM    Atrial Rate 88 BPM    P-R Interval 130 ms    QRS Duration 78 ms    Q-T Interval 370 ms    QTC Calculation(Bazett) 447 ms    P Axis 79 degrees    R Axis -48 degrees    T Axis 78 degrees    Diagnosis Line Sinus rhythm with premature atrial complexes  Left anterior fascicular block  Possible Lateral infarct , age undetermined (25 @ 04:43)      TRANSTHORACIC ECHOCARDIOGRAM REPORT  ________________________________________________________________________________                                      _______       Pt. Name:       MARTINEZ JONES Study Date:    2025  MRN:            [id number]        YOB: 1940  Accession #:    [id number]       Age:           84 years  Account#:       [id number]      Gender:        F  Heart Rate:                     Height:        62.99 in (160.00 cm)  Rhythm:                         Weight:        147.71 lb (67.00 kg)  Blood Pressure: 109/63 mmHg     BSA/BMI:       1.70 m² / 26.17 kg/m²  ________________________________________________________________________________________  Referring Physician:    [id number] Naresh Vitale  Interpreting Physician: Daniel Jorge M.D.  Primary Sonographer:    Jamal Hayes    CPT:               ECHO TTE WO CON COMP W DOPP - 52148.m  Indication(s):     Shock, unspecified - R57.9  Procedure:         Transthoracic echocardiogram with 2-D, M-mode and complete                     spectral and color flow Doppler.  Ordering Location: ICU1  Admission Status:  Inpatient  Study Information: Image quality for this study is technically difficult.    _______________________________________________________________________________________     CONCLUSIONS:      1. Technically difficult image quality.   2. Left ventricular systolic function is normal with an ejection fraction of 67 % by Arias's method of disks.   3. Mild left ventricular hypertrophy.   4.Normal right ventricular cavity size and probably normal right ventricular systolic function.   5. Tricuspid aortic valve with normal leaflet excursion. There is calcification of the aortic valve leaflets.   6. Moderate mitral valve leaflet calcification.   7. Mild mitral regurgitation.   8. Moderate tricuspid regurgitation.   9. The inferior vena cava is normal in size measuring 1.64 cm in diameter, (normal <2.1cm) with normal inspiratory collapse (normal >50%) consistent with normal right atrial pressure (~3, range 0-5mmHg).  10. Estimated pulmonary artery systolic pressure is 57 mmHg, consistent with moderate-to-severe pulmonary hypertension.  11. Trace pericardial effusion.  12. Right pleural effusion noted.  13. Compared to the transthoracic echocardiogram performed on 2025, there have been no significant interval changes.    ________________________________________________________________________________________  FINDINGS:     Left Ventricle:  Left ventricular systolic function is normal with a calculated ejection fraction of 67 % by the Arias's biplane method of disks. Mild left ventricular hypertrophy.     Right Ventricle:  The right ventricular cavity is normal in size and right ventricular systolic function is probablynormal. Tricuspid annular plane systolic excursion (TAPSE) is 1.9 cm (normal >=1.7 cm).     Left Atrium:  The left atrium is normal in size with an indexed volume of 29.06 ml/m².     Right Atrium:  The right atrium is normal in size with an indexed volume of 26.00 ml/m² and an indexed area of 9.41 cm²/m².     Interatrial Septum:  The interatrial septum appears intact.     Aortic Valve:  The aortic valve is tricuspid with normal leaflet excursion. There is calcification of the aortic valve leaflets. There is mild thickening of the aortic valve leaflets. There is trace aortic regurgitation.     Mitral Valve:  Structurally normal mitral valve with normal leaflet excursion. There is calcification of the mitral valve annulus. There is moderate leaflet calcification. There is mild mitral regurgitation.     Tricuspid Valve:  The tricuspid valve is structurally normal with normal leaflet excursion. There is moderate tricuspid regurgitation. Estimated pulmonary artery systolic pressure is 57 mmHg,consistent with moderate-to-severe pulmonary hypertension.     Pulmonic Valve:  The pulmonic valve was not well visualized. There is trace pulmonic regurgitation.     Aorta:  The aortic root at the sinuses of Valsalva is normal in size, measuring 3.30 cm (indexed 1.94 cm/m²). The ascending aorta is normal in size, measuring 3.00 cm (indexed 1.76 cm/m²). The aortic arch diameter is normal in size, measuring 2.4 cm (indexed 1.41 cm/m²).     Pericardium:  There is a trace pericardial effusion.     Pleura:  Right pleural effusion noted.     Systemic Veins:  The inferior vena cava is normal in size measuring 1.64 cm in diameter, (normal <2.1cm) with normal inspiratory collapse (normal >50%) consistent with normal right atrial pressure (~3, range 0-5mmHg).  ____________________________________________________________________  QUANTITATIVE DATA:  Left Ventricle Measurements: (Indexed to BSA)     IVSd (2D):   1.2 cm  LVPWd (2D):  1.1 cm  LVIDd (2D):  4.2 cm  LVIDs (2D):  2.4 cm  LV Mass:     169 g  99.5 g/m²  LV Vol d, MOD A2C: 38.9 ml 22.88 ml/m²  LV Vol d, MOD A4C: 40.9 ml 24.06 ml/m²  LV Vol d, MOD BP:  40.9 ml 24.04 ml/m²  LV Vol s, MOD A2C: 11.3 ml 6.65 ml/m²  LV Vol s, MOD A4C: 13.4 ml 7.88 ml/m²  LV Vol s, MOD BP:  13.4 ml 7.91 ml/m²  LVOT SV MOD BP:    27.4 ml  LV EF% MOD BP:     67 %     MV E Vmax:    1.54 m/s  MV A Vmax:    0.74 m/s  MV E/A:       2.08  e' lateral:   11.90 cm/s  e' medial:    8.81 cm/s  E/e' lateral: 12.94  E/e' medial:  17.48  E/e' Average: 14.87  MV DT:272 msec    Aorta Measurements: (Normal range) (Indexed to BSA)     Ao Root d     3.30 cm (2.7 - 3.3 cm) 1.94 cm/m²  Ao Asc d, 2D: 3.00  Ao Asc prox:  3.00 cm                1.76 cm/m²  Ao Arch:      2.4 cm            Left Atrium Measurements: (Indexed to BSA)  LA Diam 2D: 3.60 cm         Right Ventricle Measurements: Right Atrial Measurements:     TAPSE:            1.9 cm      RA Vol s, MOD A4C         44.2 ml  RV Base (RVID1):  2.9 cm      RA Vol s, MOD A4C i BSA   26.00 ml/m²  RV Mid (RVID2): 2.5 cm      RA Area s, MOD A4C        16.0 cm²  RV Major (RVID3): 6.4 cm      RA Area s, MOD A4C, i BSA 9.41 cm²/m²       LVOT / RVOT/ Qp/Qs Data: (Indexed to BSA)  LVOT Diameter,s: 2.20 cm  LVOT Area:       3.80 cm²    Mitral Valve Measurements:     MV E Vmax: 1.5 m/s         MR Vmax:          3.80 m/s  MV A Vmax: 0.7 m/s         MR Peak Gradient: 57.8 mmHg  MV E/A:    2.1       Tricuspid Valve Measurements:     TR Vmax:          3.7 m/s  TR Peak Gradient: 54.5 mmHg  RA Pressure:      3 mmHg  PASP:             57 mmHg

## 2025-06-21 NOTE — PROGRESS NOTE ADULT - SUBJECTIVE AND OBJECTIVE BOX
Patient is a 84y old  Female who presents with a chief complaint of septic shock, PNA (21 Jun 2025 08:40)      INTERVAL HPI/OVERNIGHT EVENTS: Pt seen and examined. Afebrile overnight - no events. Her breathing has improved. She endorses specks of blood in cough but none seen in bedside tissues. No other c/o at this time.    MEDICATIONS  (STANDING):  albuterol/ipratropium for Nebulization 3 milliLiter(s) Nebulizer every 6 hours  aMIOdarone    Tablet 100 milliGRAM(s) Oral daily  apixaban 2.5 milliGRAM(s) Oral two times a day  atorvastatin 10 milliGRAM(s) Oral at bedtime  chlorhexidine 2% Cloths 1 Application(s) Topical <User Schedule>  dextrose 50% Injectable 12.5 Gram(s) IV Push once  dextrose 50% Injectable 25 Gram(s) IV Push once  dextrose 50% Injectable 25 Gram(s) IV Push once  fluticasone propionate/ salmeterol 500-50 MICROgram(s) Diskus 1 Dose(s) Inhalation two times a day  gabapentin 400 milliGRAM(s) Oral every 12 hours  insulin lispro (ADMELOG) corrective regimen sliding scale   SubCutaneous three times a day before meals  insulin lispro (ADMELOG) corrective regimen sliding scale   SubCutaneous at bedtime  pantoprazole  Injectable 40 milliGRAM(s) IV Push daily  predniSONE   Tablet 5 milliGRAM(s) Oral daily  torsemide 20 milliGRAM(s) Oral daily    MEDICATIONS  (PRN):  acetaminophen     Tablet .. 650 milliGRAM(s) Oral every 6 hours PRN Mild Pain (1 - 3)  dextrose Oral Gel 15 Gram(s) Oral once PRN Blood Glucose LESS THAN 70 milliGRAM(s)/deciliter      Allergies    No Known Allergies    Intolerances        REVIEW OF SYSTEMS:  CONSTITUTIONAL: No fever or chills  HEENT:  No headache, no sore throat  RESPIRATORY: No cough, wheezing, or shortness of breath  CARDIOVASCULAR: No chest pain, palpitations  GASTROINTESTINAL: No abd pain, nausea, vomiting, or diarrhea  GENITOURINARY: No dysuria, frequency, or hematuria  NEUROLOGICAL: no focal weakness or dizziness  MUSCULOSKELETAL: no myalgias   INTEGUMENTARY:     Vital Signs Last 24 Hrs  T(C): 36.7 (21 Jun 2025 05:00), Max: 36.8 (20 Jun 2025 11:29)  T(F): 98.1 (21 Jun 2025 05:00), Max: 98.3 (20 Jun 2025 11:29)  HR: 82 (21 Jun 2025 07:36) (80 - 88)  BP: 135/61 (21 Jun 2025 05:00) (127/71 - 135/61)  BP(mean): --  RR: 18 (21 Jun 2025 05:00) (18 - 19)  SpO2: 96% (21 Jun 2025 07:36) (95% - 96%)    Parameters below as of 21 Jun 2025 07:36  Patient On (Oxygen Delivery Method): nasal cannula w/ humidification, 5 lpm        PHYSICAL EXAM:  GENERAL: NAD, elderly female, receiving duoneb  HEENT:  anicteric, moist mucous membranes  CHEST/LUNG:  Coarse BS bilaterally with diffuse expiratory wheezes. No accessory m. use. Speaking in full sentences.   HEART:  RRR, S1, S2  ABDOMEN:  BS+, soft, nontender, nondistended  MUSCULOSKELETAL: trace LE edema bilaterally  NEUROLOGIC: answers questions and follows commands appropriately      LABS:    CBC Full  -  ( 20 Jun 2025 08:15 )  WBC Count : 8.70 K/uL  Hemoglobin : 9.2 g/dL  Hematocrit : 27.7 %  Platelet Count - Automated : 277 K/uL  Mean Cell Volume : 95.2 fl  Mean Cell Hemoglobin : 31.6 pg  Mean Cell Hemoglobin Concentration : 33.2 g/dL  Auto Neutrophil # : x  Auto Lymphocyte # : x  Auto Monocyte # : x  Auto Eosinophil # : x  Auto Basophil # : x  Auto Neutrophil % : x  Auto Lymphocyte % : x  Auto Monocyte % : x  Auto Eosinophil % : x  Auto Basophil % : x      Ca    9.0        20 Jun 2025 08:15  Mg     1.7       21 Jun 2025 09:35        Urinalysis Basic - ( 20 Jun 2025 08:15 )    Color: x / Appearance: x / SG: x / pH: x  Gluc: 129 mg/dL / Ketone: x  / Bili: x / Urobili: x   Blood: x / Protein: x / Nitrite: x   Leuk Esterase: x / RBC: x / WBC x   Sq Epi: x / Non Sq Epi: x / Bacteria: x      CAPILLARY BLOOD GLUCOSE      POCT Blood Glucose.: 137 mg/dL (21 Jun 2025 07:22)  POCT Blood Glucose.: 110 mg/dL (20 Jun 2025 21:25)  POCT Blood Glucose.: 168 mg/dL (20 Jun 2025 16:18)  POCT Blood Glucose.: 120 mg/dL (20 Jun 2025 12:08)        Culture - Bronchial (collected 06-16-25 @ 13:09)  Source: Bronch Wash Bronchial Wash  Gram Stain (06-16-25 @ 21:58):    Rare Squamous epithelial cells seen per low power field    No polymorphonuclear leukocytes seen per low power field    No organisms seen per oil power field  Final Report (06-18-25 @ 10:28):    Commensal marilee consistent with body site    Culture - Acid Fast - Bronchial w/Smear (collected 06-16-25 @ 11:55)  Source: Bronch Wash Bronchial Wash  Preliminary Report (06-18-25 @ 23:07):    Culture is being performed.    Culture - Fungal, Bronchial (collected 06-16-25 @ 11:55)  Source: Bronchial Bronchial Wash  Preliminary Report (06-19-25 @ 08:55):    No growth    Culture - Legionella (collected 06-16-25 @ 11:55)  Source: Legionella  Preliminary Report (06-17-25 @ 23:01):    No Legionella species isolated to date    Culture - Bronchial (collected 06-16-25 @ 11:53)  Source: Bronch Wash Bronchial Wash  Gram Stain (06-17-25 @ 00:37):    Rare polymorphonuclear leukocytes per oil power field    No Squamous epithelial cells per oil power field    No organisms seen per oil power field  Final Report (06-18-25 @ 08:08):    Commensal marilee consistent with body site        RADIOLOGY & ADDITIONAL TESTS:    Personally reviewed.     Consultant(s) Notes Reviewed:  [x] YES  [ ] NO

## 2025-06-21 NOTE — PROGRESS NOTE ADULT - PROBLEM SELECTOR PLAN 8
-chronic recent hospitalization 5/25/25-6/5/25 for AHDF/COPD exacerbation  -Bronchodilators q 6 hrs prn for sob/wheezes  -c/w home med of Advair 500/50 q 12 hrs  -c/w home med tiotropium   -Supplemental O2 prn to maintain SPO2 > 92% -- taper o2 -chronic recent hospitalization 5/25/25-6/5/25 for AHDF/COPD exacerbation  -Bronchodilators q 6 hrs prn for sob/wheezes  -c/w home med of Advair 500/50 q 12 hrs  -c/w home med tiotropium   -Supplemental O2 prn to maintain SPO2 > 92% -- taper o2   pt s/p acute hypercarbic  hypoxic respiratory failure / s/p bipap - resolved on o2 via nc

## 2025-06-21 NOTE — PROGRESS NOTE ADULT - PROBLEM SELECTOR PLAN 5
-likely in the setting of sepsis   -on home full AC with Eliquis  - 6/12 heparin gtt started  - 6/13 : heparin gtt now discontinued 2/2 hemoptysis  - restarted Eliquis on 06/20

## 2025-06-21 NOTE — PROGRESS NOTE ADULT - PROBLEM SELECTOR PLAN 12
#Edema:  trace edema  -restarted home Torsemide 20 mg daily    #VTE ppx: - eliquis   #GI ppx: iv ppi  #Diet: Diet, Regular: DASH/TLC {Sodium & Cholesterol Restricted} (06-08-25 @ 15:56) [Active]  #Activity: Activity - Increase As Tolerated:   Time/Priority:  Routine (06-08-25 @ 14:16) [Active]  #ACP: full code  #Dispo: further plans pending clinical course #Edema:  trace edema  -restarted home Torsemide 20 mg daily- will hold am dose as mild hypernatremia and prerenal azotemia -fu bmp in am     #VTE ppx: - eliquis   #GI ppx: iv ppi  #Diet: Diet, Regular: DASH/TLC {Sodium & Cholesterol Restricted} (06-08-25 @ 15:56) [Active]  #Activity: Activity - Increase As Tolerated:   Time/Priority:  Routine (06-08-25 @ 14:16) [Active]  #ACP: full code  #Dispo: further plans pending clinical course

## 2025-06-21 NOTE — PROGRESS NOTE ADULT - ASSESSMENT
REASON FOR VISIT  .. Management of problems listed below      EVENTS/CURRENT ISSUES.  . 6/20/2025 apixa restartd and noc bpap orderde   . 6/16/2025 fob done oozing rul and l lo lobe post basal   . 6/14/2025 iv ufh dced   . 6/13/2025 continues to have mild hemoptysis but is also on iv ufh drip   . 6/13/2025 dw pt re rbaa of bronch and se agrees scheduled for 6/16 10 in or   . 6/13/2025 dw cardio and id   . 6/11/2025 qft funfitell and galactomannan orderd   . 6/11/2025 pt wa sstarted on iv ufh for hemoptysis and pleuritic chest pain but was stopped when vq showed vlp   . 6/10/2025 Mild hemoptysis   . 6/9 tr out of icu  . 6/8/2025  . PNEUMONIA RML 6/8/2025  . SHOCK 6/8/2025   . NOREPI 6/8/2025   . STRESS STEROIDS   .... HYDROCORTISONE 6/8/2025  . A fib (chronic) POA 6/8/2025  . ANEMIA Hb 6/8/2025 Hb 7.5  . KIMBERLY ON CKD Cr 6/8/2025 Cr 1.9        REVIEW OF SYMPTOMS   Able to give ROS  Yes     RELIABILITY +/-   CONSTITUTIONAL Weakness Yes    ENDOCRINE  No heat or cold intolerance    ALLERGY No hives  Sore throat No stridor  RESP Shortness of breath YES   NEURO New weakness No   CARDIAC   Palpitations No         PHYSICAL EXAM    HEENT Unremarkable  atraumatic   RESP Fair air entry  Harsh breath sound   CARDIAC S1 S2 No S3     NO JVD    ABDOMEN No hepatosplenomegaly   PEDAL EDEMA present No calf tenderness    REASON FOR VISIT  .. Management of problems listed below      CC.   . 6/8/2025 brought in by ems for right sided chest pain that started at 3am- nonradiating- patient was offered aspirin by ems- patient refused and stated to ems that she is on plavix and was advised not to take aspirin. patient on 43 litres nasl cannula-     OVERALL PRESENTATION.  . 6/8/2025 84 yr old female with PMHx afib on eliquis, COPD (not on home O2), PE/Rt lower extremity DVT 2017, Rt LE IVC filter 2017 CKD3, aplastic anemia, polymyalgia rheumatica on prednisone 5 mg po qd, requiring PRBC transfusions ~8 weeks (via Rt subclavian mediport), AVN bilat hips, HTN, HLD, HFpEF (75% 6.2.25), diastolic dysfx grade1, recent hospitalization 5/25/25-6/5/25 for ADHF/COPD exacerbation, Pt with eventual improvement and transfer to [facility] facility from where she presents to E.D. this a.m. 6/8/25 with c/o Rt sided chest pain. general malaise. Pt stated began to feel ill evening before presentation, daughter this a.m. endorsed pt lethargic, pale.     In E.D. Pt found to have fever with tmax of 101, KIMBERLY on CKD with sCr 1.90 (baseline 1.2-1.6), HYPOtnsive with SBP 80's (pt on midodrine therapy, usual 's). In addition found to have leukocytosis of 37.6 (baseline 5.25 6/5/25), procalcitonin of 13.6, Hgb 7.5, elevated INR 2.27,  Chest xray demonstrated RML consolidations, concerning for pneum. In E.D. pt receiving 500 cc's NS, Vanco 1 gm, zosyn. Pt received tylenol 1 gm IV x1.     PMH.        BEST PRACTICE ISSUES.  . HOB ELEVATN.    .... Yes  . DIET  .   .... DASH 6/8/2025   . FREE WATER.   ....   .  IV FLUID.  .... 6/15-6/20/2025 1/2 ns 50   ..... 6/8 lr 40 x 12 h   . PHARMAC DVT PPLX .    .... 6/20 apixa 2.5 x 2   .... 6/12-6/14/2025 iv ufh (hospitalist)  .... 6/11/2025 [facility] 5k.2   .... 6/10/2025 4:46 PM iv ufh   .... [facility] 6/9-6/10/2025   . NON PHARMAC DVT PPLX .      . STRESS ULCR PPLX .   ....   . DATE/DM MGMT.   ..... See under Endocrine section   GENERAL DATA .   . COVID.         .... scv2 6/8/2025 scv2 (-)   . GOC.    ....    . ICU STAY.    .... 6/8-6/9   . INFECTION PPLX .   .... CHLORHEXIDINE 2% 6/8/2025   . ALLGY.   ....  nka  . WT.   .... 6/8/2025 69   . BMI.  ....6/8/2025 26    [id number]  VITALS/GAS EXCHANGE/DRIPS    ABGS.   6/20/2025 4p 5l 736/71/65   .  VS/ PO/IO/ VENT/ DRIPS.  6/21/2025 afeb 89 130/70   6/21/2025 4l 92%    XXXXXXXXXXXXXXXXXXXXXXXXXXXXXXXXXXX  PROBLEM ASSESSMENT RECOMMENDATIONS.  RESP.   . GAS EXCHANGE .   .... target PO 90-95%     . NEED FOR HOME OXYGEN   .... 6/18/2025 will need home oxygen if at discharge pulse ox at rest or on exertion is below 88%     . COPD   .... ADVAIR 500 6/8/2025   .... MONTELUKAST 10 6/8/2025   .... SPIRIVA 6/8/2025     . RESP FAILURE  .... 6/8/2025 Has ho hypercapnia   .... 6/8/2025 recommend monitor abg  .... 6/18/2025 requiring 4l nc     . HYPERCAPNIC RESP FAILURE WITHOUT ACIDEMIA   6/20/2025 4p 5l 736/71/65   .... 6/20/2025 sterted noct bpap 35/15/6/16    . MILD HEMOPTYSIS 6/10/2025   .... ct ch 6/10/2025 cw 5/31  ........ new mf infection in rul   ........ unchanged small r greater than left pl effsn   ....... enlarged pulm artery suggesting pulm hytn   .... 6/10/2025  dw hemat cardio id and instead of restarting apixa will start iv UFH which can be easily reversed in case Hb starts dropping but will be the rx for venous thromboembolism as well as for a fib   .... v duplx 6/10 (-)  .... vq 6/10 vlp   .... 6/13/2025 continues to have mild hemoptysis but is also on iv ufh drip   . 6/13/2025 dw pt re rbaa of bronch and se agrees scheduled for 6/16 10 in or   .... 6/13/2025 dw cardio and id   .... 6/16 fob done showed ooze rul and l lo lobe no eb lesions   .... 6/17/2025 continues to have mild hemoptysis   .... 6/17/2025 30% of hemoptysis cases no cause is found  Ordered gbm ab rf dsdna rf apla chapman   .... 6/18/2025 cbmab dsdna inderjit apla ab all (-)   .... 6/12 fungitell galactomannan (-)   .... strep legn ag 6/9 (-)   .... fob 6/16 afb sm (-) cyto (-)  .... 6/18/2025 hemoptysis likely sec to pneumonia in setting of pulm hypertension   .... 6/20/2025 apixa restarted     . PLEURITIC CHEST PAIN RO VTE   .... v duplx 6/10 (-)  .... vq 6/10 vlp   .... 6/12/2025 iv ufh was stopped 6/11 as vq vlp but was restarted by hospitalist 6/12/2025 for af   .... chest pain resolvd       INFECTION.  . DATA  .... w 6/8-6/10-6/13-6/14-6/15-6/17-6/18-6/19-6/20/2025   .... w 13.6 - 9.2 - 15.9 - 18- 14 - 9.6 - 11.9- 9.8 - 8.7   .... esr 6/8/2025 esr 17   .... w 6/8-6/9/2025 w 32 - 24   .... ua 6/8/2025 w 4   .... cxr 6/8/2025 cxr dense infiltra r upper hilum r mediport  .... ct ch 6/10 cw 5/31  ........ r greater than l effs   ........ partial rll atelectasis   ........ new patchy and nodular areas of consolidation rul and associated ground glass opacities   ........ ground glass and nodular opac also scott   ........ numerous tib rml and rll     . MICROBIO  .... sp 6/8 n commensals   .... flu ab 6/8/2025 (-)   .... rsv 6/8/2025 (-)    .... mrsa 6/9/2025 (-)  .... uc 6/8 (-)  .... bc 6/8 (-)     . PNEUMONIA RUL 6/8/2025   .... AZITHRO 6/8-6/11 /2025   .... ZOSYN 6/8-6/15/2025     CARDIAC.  . PAIN CHEST   .... 6/10/2025 co pain chest r   .... 6/10/2025 check ct to see if she has pleurisy   .... 6/10/2025  dw hemat cardio id and instead of restarting apixa will start iv UFH which can be easily reversed in case Hb starts dropping but will be the rx for venous thromboembolism as well as for a fib     . STRESS STEROIDS   .... HYDROCORTISONE   ........ 6/8/2025 h 50.4  ........ 6/10/2025 h 50.2   ........ 6/12/2025 hydrocort 50   ....... 6/14/2025 h 25  ........ 6/17/2025 dced     . SHOCK   .... MIDODRINE 6/8-6/16/2025 m 10.3   .... DROXIDOPA 6/9-6/16/2025 d 100.3   .... NOREPI 6/8-6/9/2025 n 8/250   .... target map 65 (+)     . CAD    .... Trop 6/8-6/9/2025 Tr 32- 24     . LACTICEMIA   .... la 6/8/2025 la 1.4     . CHF  .... pbnp 6/8/2025 pbnp 6599   .... tte 4/2025 mild ph  n vf  .... tte 6/2/2025   ........ ef 71%   ........ n rvsf    ........ pasp 54   ....... la mod dilatd   .... torsemide 20 6/17/2025  .... lasix 40 once 6/14/2025    . A fib (chronic) POA 6/8/2025  .... AMIODARONE 100 6/8/2025  .... 6/10/2025  dw hemat cardio id and instead of restarting apixa will start iv UFH which can be easily reversed in case Hb starts dropping but will be the rx for venous thromboembolism as well as for a fib   .... 6/12-6/14/2025 iv ufh     HEMAT.  . DATA  .... Plt 6/8/2025 plt 153   .... inr 6/8-6/9/2025 inr 2.27- 1.63      . ANEMIA   .... Hb 6/19-6/20-6/21/2025 Hb 7.8 - 9.2 - 9.3     .... Hb 6/8-6/9-6/10-6/11-6/13-6/14-6/15-6/17-6/18/2025   .... Hb 7.5- 7.8 - 7.6- 7.1 - 8.4 - 7.3- 9.1 - 8.1 - 8.5    . TRANSFUSION  .... 6/8  1 u prbc    .... 6/14 2 u prbc     GI.   . DIET .   .... dash 6/8/2025     . LFT MONITORING   .... LFTS    6/8/2025  ........ AP     39  ........ AST   11  ........ ALT    35    RENAL.  . DATA  .... Na 6/8/2025 Na 137  .... K 6/8/2025 K 4   .... CO2 6/8/2025 co2 29   .... ag 6/8/2025 ag 9  .... alb 6/8/2025 alb 3.4     . KIMBERLY ON CKD   .... Cr 6/8-6/9-6/10-6/11-6/13-6/14/2025   .... Cr 1.9 - 1.4 - 1.3 - 1.3 - 1.1 - 1  .... Cr 5/27-5/28-5/29-5/30-6/1/2025   .... Cr 1.2 - 1.3 - 1.3 - 1.6 - 1.6    ENDO.  . DM  .  .... 6/8/2025 SL SCALE     NEURO.  . PAIN  .... GABAPENTIN 6/8/2025 g 400.2       XXXXXXXXXXXXXXXXXXXXXX   SUMMARY BASELINE .     84 yr old female pt of Dr Gallegos not on home ox or home cpap used to use trelegy lives with spouse quit 40 y 1/2 ppd with PMHx afib on eliquis, COPD (not on home O2), PE/Rt lower extremity DVT 2017, Rt LE IVC filter 2017 CKD3, aplastic anemia, polymyalgia rheumatica on prednisone 5 mg po qd, requiring PRBC transfusions around 8 weeks (via Rt subclavian mediport), AVN bilat hips, HTN, HLD, HFpEF (75% 6.2.25), diastolic dysfx grade1, recent hospitalization 5/25/25-6/5/25 for ADHF/COPD exacerbation, Pt with eventual improvement and transfer to [facility] facility   CC.   . 6/8/2025 R CHEST PAIN   MAIN ISSUES.  . COPD   . HYPERCAPNIC RESP FAILURE  6/20/2025 4p 5l 736/71/65   .... 6/20/2025 Noct bpap 15/5/35/16 ordrd   . MILD HEMOPTYSOIS 6/10/2025   . PNEUMONIA RML 6/8/2025  .... ZOSYN 6/8/2025   . PLEURITIS CHEST PAIN 6/10/2025  .... 6/10 vte excluded vlp vq   . SHOCK 6/8/2025   .... resolvd tr oo icu 6/9   . NOREPI 6/8-6/9/2025   . STRESS STEROIDS   .... HYDROCORTISONE 6/8-6/17/2025  . A fib (chronic) POA 6/8/2025  .... 6/10-6/10/2025 IV UFH   .... 6/12- 6/14 IV UFH   . ANEMIA Hb 6/8-6/13/2025 Hb 7.5- 8.4  . TRANSFUSION  .... 6/8  1 u prbc    . KIMBERLY ON CKD Cr 6/8-6/13/2025 Cr 1.9- 1.1  PMH.   . AFon Eliquis,   . COPD (not on home o2),   . CKD,   . aplastic anemia,   . TRANSFUSION 6/2/2025 1 u prbc   . PMR (chronic prednisone with AVN hip),   . HLD,   . HTN,   . DM    PROCEDURES/DEVICES .  . 6/16/2025 FOB br wash rul ooze rul l lo lobe post basal     PAST PROCEDURES   . r mediport poa 6/8/2025     DISCUSSIONS.  .... Discussed with primary care and relevant consultants on an ongoing basis       TIME SPENT.  . Over 36 minutes aggregate care time spent on encounter; activities included   direct patient care, counseling and/or coordinating care reviewing notes, lab data/ imaging , discussion with multidisciplinary team/ patient  /family and explaining in detail risks, benefits, alternatives  of the recommendations     ROBERT HENRIQUEZ 84 6/8/2025 1942

## 2025-06-21 NOTE — PROGRESS NOTE ADULT - ASSESSMENT
84F h/o AFib on Eliquis, COPD, CKD, aplastic anemia, polymyalgia rheumatica on chronic steroids, avascular necrosis of hips, HTN, HLD, with recent admission to [facility] 5/26 - 6/5 for acute HFpEF exacerbation and COPD exacerbation, discharged to rehab on 6/5 and returning today with right sided chest pain, general malaise and feeling unwell. She reports some slight cough though otherwise no other new symptoms reported. Found to be hypotensive, febrile w/ leukocytosis. Admitted to ICU for septic shock likely 2/2 to PNA and KIMBERLY. Now stable for downgrade to tele.

## 2025-06-22 LAB
ALBUMIN SERPL ELPH-MCNC: 2.9 G/DL — LOW (ref 3.3–5)
ALP SERPL-CCNC: 58 U/L — SIGNIFICANT CHANGE UP (ref 40–120)
ALT FLD-CCNC: 21 U/L — SIGNIFICANT CHANGE UP (ref 12–78)
ANION GAP SERPL CALC-SCNC: 3 MMOL/L — LOW (ref 5–17)
AST SERPL-CCNC: <3 U/L — LOW (ref 15–37)
BASE EXCESS BLDA CALC-SCNC: 15 MMOL/L — HIGH (ref -2–3)
BASE EXCESS BLDA CALC-SCNC: 15.3 MMOL/L — HIGH (ref -2–3)
BASOPHILS # BLD AUTO: 0.01 K/UL — SIGNIFICANT CHANGE UP (ref 0–0.2)
BASOPHILS NFR BLD AUTO: 0.1 % — SIGNIFICANT CHANGE UP (ref 0–2)
BILIRUB SERPL-MCNC: 0.7 MG/DL — SIGNIFICANT CHANGE UP (ref 0.2–1.2)
BLOOD GAS COMMENTS ARTERIAL: SIGNIFICANT CHANGE UP
BLOOD GAS COMMENTS ARTERIAL: SIGNIFICANT CHANGE UP
BUN SERPL-MCNC: 31 MG/DL — HIGH (ref 7–23)
CALCIUM SERPL-MCNC: 9 MG/DL — SIGNIFICANT CHANGE UP (ref 8.5–10.1)
CHLORIDE SERPL-SCNC: 104 MMOL/L — SIGNIFICANT CHANGE UP (ref 96–108)
CO2 SERPL-SCNC: 39 MMOL/L — HIGH (ref 22–31)
CREAT SERPL-MCNC: 0.88 MG/DL — SIGNIFICANT CHANGE UP (ref 0.5–1.3)
EGFR: 66 ML/MIN/1.73M2 — SIGNIFICANT CHANGE UP
EGFR: 66 ML/MIN/1.73M2 — SIGNIFICANT CHANGE UP
EOSINOPHIL # BLD AUTO: 0 K/UL — SIGNIFICANT CHANGE UP (ref 0–0.5)
EOSINOPHIL NFR BLD AUTO: 0 % — SIGNIFICANT CHANGE UP (ref 0–6)
GAS PNL BLDA: SIGNIFICANT CHANGE UP
GAS PNL BLDA: SIGNIFICANT CHANGE UP
GLUCOSE BLDC GLUCOMTR-MCNC: 119 MG/DL — HIGH (ref 70–99)
GLUCOSE BLDC GLUCOMTR-MCNC: 137 MG/DL — HIGH (ref 70–99)
GLUCOSE BLDC GLUCOMTR-MCNC: 154 MG/DL — HIGH (ref 70–99)
GLUCOSE BLDC GLUCOMTR-MCNC: 177 MG/DL — HIGH (ref 70–99)
GLUCOSE SERPL-MCNC: 117 MG/DL — HIGH (ref 70–99)
HCO3 BLDA-SCNC: 41 MMOL/L — HIGH (ref 21–28)
HCO3 BLDA-SCNC: 42 MMOL/L — HIGH (ref 21–28)
HCT VFR BLD CALC: 26.2 % — LOW (ref 34.5–45)
HCT VFR BLD CALC: 26.4 % — LOW (ref 34.5–45)
HCT VFR BLD CALC: 28.2 % — LOW (ref 34.5–45)
HGB BLD-MCNC: 7.9 G/DL — LOW (ref 11.5–15.5)
HGB BLD-MCNC: 8.2 G/DL — LOW (ref 11.5–15.5)
HGB BLD-MCNC: 8.3 G/DL — LOW (ref 11.5–15.5)
HOROWITZ INDEX BLDA+IHG-RTO: 100 — SIGNIFICANT CHANGE UP
HOROWITZ INDEX BLDA+IHG-RTO: 40 — SIGNIFICANT CHANGE UP
IMM GRANULOCYTES # BLD AUTO: 0.07 K/UL — SIGNIFICANT CHANGE UP (ref 0–0.07)
IMM GRANULOCYTES NFR BLD AUTO: 1 % — HIGH (ref 0–0.9)
LYMPHOCYTES # BLD AUTO: 0.45 K/UL — LOW (ref 1–3.3)
LYMPHOCYTES NFR BLD AUTO: 6.2 % — LOW (ref 13–44)
MCHC RBC-ENTMCNC: 29.1 G/DL — LOW (ref 32–36)
MCHC RBC-ENTMCNC: 29.6 PG — SIGNIFICANT CHANGE UP (ref 27–34)
MCHC RBC-ENTMCNC: 30.9 PG — SIGNIFICANT CHANGE UP (ref 27–34)
MCHC RBC-ENTMCNC: 31.4 G/DL — LOW (ref 32–36)
MCV RBC AUTO: 101.8 FL — HIGH (ref 80–100)
MCV RBC AUTO: 98.1 FL — SIGNIFICANT CHANGE UP (ref 80–100)
MONOCYTES # BLD AUTO: 1.35 K/UL — HIGH (ref 0–0.9)
MONOCYTES NFR BLD AUTO: 18.5 % — HIGH (ref 2–14)
NEUTROPHILS # BLD AUTO: 5.4 K/UL — SIGNIFICANT CHANGE UP (ref 1.8–7.4)
NEUTROPHILS NFR BLD AUTO: 74.2 % — SIGNIFICANT CHANGE UP (ref 43–77)
NRBC # BLD AUTO: 0 K/UL — SIGNIFICANT CHANGE UP (ref 0–0)
NRBC # BLD AUTO: 0.02 K/UL — HIGH (ref 0–0)
NRBC # FLD: 0 K/UL — SIGNIFICANT CHANGE UP (ref 0–0)
NRBC # FLD: 0.02 K/UL — HIGH (ref 0–0)
NRBC BLD AUTO-RTO: 0 /100 WBCS — SIGNIFICANT CHANGE UP (ref 0–0)
NRBC BLD AUTO-RTO: 0 /100 WBCS — SIGNIFICANT CHANGE UP (ref 0–0)
PCO2 BLDA: 82 MMHG — CRITICAL HIGH (ref 32–35)
PCO2 BLDA: 92 MMHG — CRITICAL HIGH (ref 32–35)
PH BLDA: 7.27 — LOW (ref 7.35–7.45)
PH BLDA: 7.31 — LOW (ref 7.35–7.45)
PLATELET # BLD AUTO: 253 K/UL — SIGNIFICANT CHANGE UP (ref 150–400)
PLATELET # BLD AUTO: 259 K/UL — SIGNIFICANT CHANGE UP (ref 150–400)
PMV BLD: 11 FL — SIGNIFICANT CHANGE UP (ref 7–13)
PMV BLD: 11.1 FL — SIGNIFICANT CHANGE UP (ref 7–13)
PO2 BLDA: 153 MMHG — HIGH (ref 83–108)
PO2 BLDA: 57 MMHG — LOW (ref 83–108)
POTASSIUM SERPL-MCNC: 4.3 MMOL/L — SIGNIFICANT CHANGE UP (ref 3.5–5.3)
POTASSIUM SERPL-SCNC: 4.3 MMOL/L — SIGNIFICANT CHANGE UP (ref 3.5–5.3)
PROT SERPL-MCNC: 5.9 G/DL — LOW (ref 6–8.3)
RBC # BLD: 2.69 M/UL — LOW (ref 3.8–5.2)
RBC # BLD: 2.77 M/UL — LOW (ref 3.8–5.2)
RBC # FLD: 19.6 % — HIGH (ref 10.3–14.5)
RBC # FLD: 19.9 % — HIGH (ref 10.3–14.5)
SAO2 % BLDA: 92.1 % — LOW (ref 94–98)
SAO2 % BLDA: 99.7 % — HIGH (ref 94–98)
SODIUM SERPL-SCNC: 146 MMOL/L — HIGH (ref 135–145)
WBC # BLD: 15.13 K/UL — HIGH (ref 3.8–10.5)
WBC # BLD: 7.28 K/UL — SIGNIFICANT CHANGE UP (ref 3.8–10.5)
WBC # FLD AUTO: 15.13 K/UL — HIGH (ref 3.8–10.5)
WBC # FLD AUTO: 7.28 K/UL — SIGNIFICANT CHANGE UP (ref 3.8–10.5)

## 2025-06-22 PROCEDURE — 99233 SBSQ HOSP IP/OBS HIGH 50: CPT | Mod: GC

## 2025-06-22 PROCEDURE — 99233 SBSQ HOSP IP/OBS HIGH 50: CPT

## 2025-06-22 PROCEDURE — 71045 X-RAY EXAM CHEST 1 VIEW: CPT | Mod: 26

## 2025-06-22 PROCEDURE — 71250 CT THORAX DX C-: CPT | Mod: 26

## 2025-06-22 RX ORDER — DEXMEDETOMIDINE HYDROCHLORIDE IN SODIUM CHLORIDE 4 UG/ML
0.2 INJECTION INTRAVENOUS
Qty: 400 | Refills: 0 | Status: DISCONTINUED | OUTPATIENT
Start: 2025-06-22 | End: 2025-06-23

## 2025-06-22 RX ORDER — FUROSEMIDE 10 MG/ML
40 INJECTION INTRAMUSCULAR; INTRAVENOUS DAILY
Refills: 0 | Status: DISCONTINUED | OUTPATIENT
Start: 2025-06-22 | End: 2025-06-25

## 2025-06-22 RX ORDER — VANCOMYCIN HCL IN 5 % DEXTROSE 1.5G/250ML
1000 PLASTIC BAG, INJECTION (ML) INTRAVENOUS ONCE
Refills: 0 | Status: COMPLETED | OUTPATIENT
Start: 2025-06-22 | End: 2025-06-22

## 2025-06-22 RX ORDER — AZITHROMYCIN 250 MG
500 CAPSULE ORAL EVERY 24 HOURS
Refills: 0 | Status: DISCONTINUED | OUTPATIENT
Start: 2025-06-23 | End: 2025-06-23

## 2025-06-22 RX ORDER — FUROSEMIDE 10 MG/ML
40 INJECTION INTRAMUSCULAR; INTRAVENOUS DAILY
Refills: 0 | Status: DISCONTINUED | OUTPATIENT
Start: 2025-06-22 | End: 2025-06-22

## 2025-06-22 RX ORDER — INSULIN LISPRO 100 U/ML
INJECTION, SOLUTION INTRAVENOUS; SUBCUTANEOUS EVERY 6 HOURS
Refills: 0 | Status: DISCONTINUED | OUTPATIENT
Start: 2025-06-22 | End: 2025-06-24

## 2025-06-22 RX ORDER — AZITHROMYCIN 250 MG
CAPSULE ORAL
Refills: 0 | Status: DISCONTINUED | OUTPATIENT
Start: 2025-06-22 | End: 2025-06-23

## 2025-06-22 RX ORDER — HYDROCORTISONE 20 MG
50 TABLET ORAL EVERY 8 HOURS
Refills: 0 | Status: DISCONTINUED | OUTPATIENT
Start: 2025-06-22 | End: 2025-06-24

## 2025-06-22 RX ORDER — MAGNESIUM SULFATE 500 MG/ML
1 SYRINGE (ML) INJECTION ONCE
Refills: 0 | Status: COMPLETED | OUTPATIENT
Start: 2025-06-22 | End: 2025-06-22

## 2025-06-22 RX ORDER — FUROSEMIDE 10 MG/ML
20 INJECTION INTRAMUSCULAR; INTRAVENOUS ONCE
Refills: 0 | Status: DISCONTINUED | OUTPATIENT
Start: 2025-06-22 | End: 2025-06-23

## 2025-06-22 RX ORDER — DEXMEDETOMIDINE HYDROCHLORIDE IN SODIUM CHLORIDE 4 UG/ML
0.2 INJECTION INTRAVENOUS
Qty: 200 | Refills: 0 | Status: DISCONTINUED | OUTPATIENT
Start: 2025-06-22 | End: 2025-06-22

## 2025-06-22 RX ORDER — SODIUM CHLORIDE 9 G/1000ML
1000 INJECTION, SOLUTION INTRAVENOUS
Refills: 0 | Status: DISCONTINUED | OUTPATIENT
Start: 2025-06-22 | End: 2025-07-04

## 2025-06-22 RX ORDER — PIPERACILLIN-TAZO-DEXTROSE,ISO 3.375G/5
3.38 IV SOLUTION, PIGGYBACK PREMIX FROZEN(ML) INTRAVENOUS EVERY 8 HOURS
Refills: 0 | Status: COMPLETED | OUTPATIENT
Start: 2025-06-22 | End: 2025-06-29

## 2025-06-22 RX ORDER — AZITHROMYCIN 250 MG
500 CAPSULE ORAL ONCE
Refills: 0 | Status: COMPLETED | OUTPATIENT
Start: 2025-06-22 | End: 2025-06-22

## 2025-06-22 RX ORDER — GLUCAGON 3 MG/1
1 POWDER NASAL ONCE
Refills: 0 | Status: DISCONTINUED | OUTPATIENT
Start: 2025-06-22 | End: 2025-07-04

## 2025-06-22 RX ADMIN — INSULIN LISPRO 1: 100 INJECTION, SOLUTION INTRAVENOUS; SUBCUTANEOUS at 16:45

## 2025-06-22 RX ADMIN — Medication 4 MILLILITER(S): at 19:17

## 2025-06-22 RX ADMIN — Medication 40 MILLIGRAM(S): at 05:14

## 2025-06-22 RX ADMIN — IPRATROPIUM BROMIDE AND ALBUTEROL SULFATE 3 MILLILITER(S): .5; 2.5 SOLUTION RESPIRATORY (INHALATION) at 19:17

## 2025-06-22 RX ADMIN — PREDNISONE 5 MILLIGRAM(S): 20 TABLET ORAL at 05:14

## 2025-06-22 RX ADMIN — APIXABAN 2.5 MILLIGRAM(S): 2.5 TABLET, FILM COATED ORAL at 08:15

## 2025-06-22 RX ADMIN — Medication 50 MILLIGRAM(S): at 21:16

## 2025-06-22 RX ADMIN — Medication 25 GRAM(S): at 22:10

## 2025-06-22 RX ADMIN — IPRATROPIUM BROMIDE AND ALBUTEROL SULFATE 3 MILLILITER(S): .5; 2.5 SOLUTION RESPIRATORY (INHALATION) at 07:15

## 2025-06-22 RX ADMIN — Medication 250 MILLIGRAM(S): at 13:36

## 2025-06-22 RX ADMIN — Medication 100 GRAM(S): at 20:35

## 2025-06-22 RX ADMIN — AMIODARONE HYDROCHLORIDE 100 MILLIGRAM(S): 50 INJECTION, SOLUTION INTRAVENOUS at 05:14

## 2025-06-22 RX ADMIN — Medication 25 GRAM(S): at 15:27

## 2025-06-22 RX ADMIN — Medication 1 APPLICATION(S): at 05:19

## 2025-06-22 RX ADMIN — FUROSEMIDE 40 MILLIGRAM(S): 10 INJECTION INTRAMUSCULAR; INTRAVENOUS at 12:52

## 2025-06-22 RX ADMIN — GABAPENTIN 400 MILLIGRAM(S): 400 CAPSULE ORAL at 05:14

## 2025-06-22 RX ADMIN — IPRATROPIUM BROMIDE AND ALBUTEROL SULFATE 3 MILLILITER(S): .5; 2.5 SOLUTION RESPIRATORY (INHALATION) at 01:06

## 2025-06-22 RX ADMIN — Medication 250 MILLIGRAM(S): at 21:21

## 2025-06-22 RX ADMIN — Medication 1 DOSE(S): at 05:15

## 2025-06-22 RX ADMIN — Medication 4 MILLILITER(S): at 13:57

## 2025-06-22 RX ADMIN — DEXMEDETOMIDINE HYDROCHLORIDE IN SODIUM CHLORIDE 3.6 MICROGRAM(S)/KG/HR: 4 INJECTION INTRAVENOUS at 21:34

## 2025-06-22 RX ADMIN — IPRATROPIUM BROMIDE AND ALBUTEROL SULFATE 3 MILLILITER(S): .5; 2.5 SOLUTION RESPIRATORY (INHALATION) at 13:57

## 2025-06-22 NOTE — CONSULT NOTE ADULT - TIME BILLING
I spent 40 minutes providing medical care for this patient. This includes reviewing labs, consultant notes, vital signs, ins and outs, medication list, any imaging obtained, examining and assessing patient, discussing with patient and/or HCP or representative of patient. This does not include any procedure related time.

## 2025-06-22 NOTE — PROGRESS NOTE ADULT - PROBLEM SELECTOR PLAN 3
- The patient is noted to have blood-tinged sputum  - requiring PRBC transfusions ~8 weeks (via Rt subclavian mediport)  - Hgb 7.6 (baseline appears to be ~8)   - transfuse <7-7.5  - 6/12 heparin gtt started  - 6/13 : heparin gtt now discontinued 2/2 hemoptysis  - S/p 2 unit PRBC- will give lasix 40mg IVP x1 in between units -- good response  - ordered 1 unit pRBC on 06/19-- improved hgb   - restarted eliquis 2.5mg BID - montior hgb/platelets - The patient is noted to have blood-tinged sputum  - requiring PRBC transfusions ~8 weeks (via Rt subclavian mediport)  - Hgb 7.6 (baseline appears to be ~8)   - transfuse <7-7.5  - 6/12 heparin gtt started  - 6/13 : heparin gtt now discontinued 2/2 hemoptysis  - S/p 2 unit PRBC- will give lasix 40mg IVP x1 in between units -- good response  - ordered 1 unit pRBC on 06/19-- improved hgb   - restarted Eliquis 2.5mg BID - but could not tolerata pt possible bleeding upper lung - stop eliquis as per dr arita  -  drop  o2 sat to 70 on 9 to 10 lit nc - now on high flow 50 lit - The patient is noted to have blood-tinged sputum  - requiring PRBC transfusions ~8 weeks (via Rt subclavian mediport)  - Hgb 7.6 (baseline appears to be ~8)   - transfuse <7-7.5  - 6/12 heparin gtt started  - 6/13 : heparin gtt now discontinued 2/2 hemoptysis  - S/p 2 unit PRBC- will give lasix 40mg IVP x1 in between units -- good response  - ordered 1 unit pRBC on 06/19-- improved hgb   - restarted Eliquis 2.5mg BID - but could not tolerata pt possible bleeding upper lung - stop eliquis as per dr arita  -  drop  o2 sat to 70 on 9 to 10 lit nc - now on high flow 50 lit  -ordered 1pRBC on 06/22, should administer 20mg IVP s/p transfusion

## 2025-06-22 NOTE — CHART NOTE - NSCHARTNOTEFT_GEN_A_CORE
Previously on 6 L NC satting 96% with increased oxygen requirements throughout the night. Now on BIPAP 70% per RT satting 96%. Day team to follow up

## 2025-06-22 NOTE — CONSULT NOTE ADULT - SUBJECTIVE AND OBJECTIVE BOX
Date of entry of this note is equal to date of services rendered    BRIEF HOSPITAL COURSE:      INTERVAL EVENTS:   RRT tonight for lethargy. Pt seen at bedside on 40L/60% fio2 spo2 90%. Pt is nearly obtunded opens eyes to stimulation. According to her daughter at bedside has been refusing BIPAP.   Suspect acute hypercapnic respiratory failure. STAT ABG. Start BIPAP 16/8 40%.   On empiric Abx      PAST MEDICAL & SURGICAL HISTORY:  COPD (chronic obstructive pulmonary disease)      DM (diabetes mellitus)  diet-controlled      PAF (paroxysmal atrial fibrillation)  s/p ablation      Hiatal hernia      H/O aplastic anemia      History of IBS      H/O osteoporosis      HLD (hyperlipidemia)      PMR (polymyalgia rheumatica)      History of basal cell cancer      Chronic kidney disease (CKD)      Hypotension      Presence of IVC filter      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      History of immunodeficiency      Lumbar compression fracture      H/O hiatal hernia      H/O pulmonary fibrosis      H/O sinus bradycardia      History of fracture of right hip  "unoperable"      S/P hysterectomy      S/P foot surgery      S/P knee surgery      After cataract  bilateral eye cataract surgically removed      Closed right hip fracture  rigth hip ORIF july 19 2016      S/P IVC filter  nov 2016      S/P carpal tunnel release  Right 2008 & 6/27/17      H/O kyphoplasty      Port-A-Cath in place  right chest        Allergies    No Known Allergies    Intolerances      FAMILY HISTORY:  Family history of bladder cancer (Mother)    Family history of myocardial infarction (Father)    FH: atrial fibrillation (Father)        REVIEW OF SYSTEMS:     [ ] A ten-point review of systems was otherwise negative except as noted.  [] Due to altered mental status/intubation, subjective information were not able to be obtained from the patient. History was obtained, to the extent possible, from review of the chart and collateral sources of information.        Vital Signs Last 24 Hrs  T(C): 35.7 (22 Jun 2025 20:00), Max: 37.1 (22 Jun 2025 12:45)  T(F): 96.3 (22 Jun 2025 20:00), Max: 98.7 (22 Jun 2025 12:45)  HR: 76 (22 Jun 2025 20:00) (75 - 89)  BP: 112/52 (22 Jun 2025 20:00) (109/72 - 131/78)  BP(mean): 70 (22 Jun 2025 20:00) (70 - 85)  RR: 19 (22 Jun 2025 20:00) (16 - 19)  SpO2: 100% (22 Jun 2025 20:00) (92% - 100%)    Parameters below as of 22 Jun 2025 19:45  Patient On (Oxygen Delivery Method): nasal cannula, high flow        I&O's Detail    22 Jun 2025 07:01  -  22 Jun 2025 20:08  --------------------------------------------------------  IN:  Total IN: 0 mL    OUT:    Voided (mL): 1 mL  Total OUT: 1 mL    Total NET: -1 mL          LABS:                        7.9    x     )-----------( x        ( 22 Jun 2025 12:30 )             26.2     06-22    146[H]  |  104  |  31[H]  ----------------------------<  117[H]  4.3   |  39[H]  |  0.88    Ca    9.0      22 Jun 2025 07:40  Phos  2.7     06-21  Mg     1.7     06-21    TPro  5.9[L]  /  Alb  2.9[L]  /  TBili  0.7  /  DBili  x   /  AST  <3[L]  /  ALT  21  /  AlkPhos  58  06-22        Urinalysis Basic - ( 22 Jun 2025 07:40 )    Color: x / Appearance: x / SG: x / pH: x  Gluc: 117 mg/dL / Ketone: x  / Bili: x / Urobili: x   Blood: x / Protein: x / Nitrite: x   Leuk Esterase: x / RBC: x / WBC x   Sq Epi: x / Non Sq Epi: x / Bacteria: x      CULTURES:  Culture Results:   Commensal marilee consistent with body site (06-16 @ 13:09)  Culture Results:   Culture is being performed. (06-16 @ 11:55)  Culture Results:   No growth (06-16 @ 11:55)  Culture Results:   No Legionella species isolated to date (06-16 @ 11:55)  Culture Results:   Commensal marilee consistent with body site (06-16 @ 11:53)      PHYSICAL EXAM:  General: Ill appearing  HEENT: Pupils equal, reactive to light.  Symmetric.  PULM: Clear to auscultation bilaterally  CVS: Regular rate and rhythm  ABD: Soft, nondistended, nontender  EXT: No edema, nontender, Warm   NEURO: Alert, oriented. Sensation intact. No focal deficits.      RADIOLOGY:                    Date of entry of this note is equal to date of services rendered    BRIEF HOSPITAL COURSE: 84 female PMH of A-fib on Eliquis, COPD, CKD, aplastic anemia, polymyalgia rheumatica, on chronic steroids, avascular necrosis of hips, HLD, HTN, DM, recent admission to [facility] 5/26 through 6/5/2025 for CHF/fluid overload/COPD exacerbation, presents to the ED form [facility] for evaluation of fever and generalized feeling of being unwell, found to be septic and hypotensive on 6/8 admission, admitted for ICU briefly for vasopressors support and placed on floo. MICU reconsulted on 6/22 for hypoxemia/hypercapnia during the day at that time pt appeared well on HFNC 30%fio2 therefore pt stayed on GMF. RRT tonight for AMS concern for hypercapnic respiratory failure    INTERVAL EVENTS:   RRT tonight for AMS/lethargy. Pt seen at bedside on HFNC 40L/60% fio2 spo2 90%. Pt is nearly obtunded opens eyes to stimulation. According to her daughter at bedside has been refusing BIPAP.   Suspect acute hypercapnic respiratory failure. STAT ABG is 7.31/82/153/41. Start BIPAP 16/8 40%.     PAST MEDICAL & SURGICAL HISTORY:  COPD (chronic obstructive pulmonary disease)      DM (diabetes mellitus)  diet-controlled      PAF (paroxysmal atrial fibrillation)  s/p ablation      Hiatal hernia      H/O aplastic anemia      History of IBS      H/O osteoporosis      HLD (hyperlipidemia)      PMR (polymyalgia rheumatica)      History of basal cell cancer      Chronic kidney disease (CKD)      Hypotension      Presence of IVC filter      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      History of immunodeficiency      Lumbar compression fracture      H/O hiatal hernia      H/O pulmonary fibrosis      H/O sinus bradycardia      History of fracture of right hip  "unoperable"      S/P hysterectomy      S/P foot surgery      S/P knee surgery      After cataract  bilateral eye cataract surgically removed      Closed right hip fracture  rigth hip ORIF july 19 2016      S/P IVC filter  nov 2016      S/P carpal tunnel release  Right 2008 & 6/27/17      H/O kyphoplasty      Port-A-Cath in place  right chest        Allergies    No Known Allergies    Intolerances      FAMILY HISTORY:  Family history of bladder cancer (Mother)    Family history of myocardial infarction (Father)    FH: atrial fibrillation (Father)        REVIEW OF SYSTEMS:     [ ] A ten-point review of systems was otherwise negative except as noted.  [X] Due to altered mental status/intubation, subjective information were not able to be obtained from the patient. History was obtained, to the extent possible, from review of the chart and collateral sources of information.        Vital Signs Last 24 Hrs  T(C): 35.7 (22 Jun 2025 20:00), Max: 37.1 (22 Jun 2025 12:45)  T(F): 96.3 (22 Jun 2025 20:00), Max: 98.7 (22 Jun 2025 12:45)  HR: 76 (22 Jun 2025 20:00) (75 - 89)  BP: 112/52 (22 Jun 2025 20:00) (109/72 - 131/78)  BP(mean): 70 (22 Jun 2025 20:00) (70 - 85)  RR: 19 (22 Jun 2025 20:00) (16 - 19)  SpO2: 100% (22 Jun 2025 20:00) (92% - 100%)    Parameters below as of 22 Jun 2025 19:45  Patient On (Oxygen Delivery Method): nasal cannula, high flow        I&O's Detail    22 Jun 2025 07:01  -  22 Jun 2025 20:08  --------------------------------------------------------  IN:  Total IN: 0 mL    OUT:    Voided (mL): 1 mL  Total OUT: 1 mL    Total NET: -1 mL          LABS:                        7.9    x     )-----------( x        ( 22 Jun 2025 12:30 )             26.2     06-22    146[H]  |  104  |  31[H]  ----------------------------<  117[H]  4.3   |  39[H]  |  0.88    Ca    9.0      22 Jun 2025 07:40  Phos  2.7     06-21  Mg     1.7     06-21    TPro  5.9[L]  /  Alb  2.9[L]  /  TBili  0.7  /  DBili  x   /  AST  <3[L]  /  ALT  21  /  AlkPhos  58  06-22        Urinalysis Basic - ( 22 Jun 2025 07:40 )    Color: x / Appearance: x / SG: x / pH: x  Gluc: 117 mg/dL / Ketone: x  / Bili: x / Urobili: x   Blood: x / Protein: x / Nitrite: x   Leuk Esterase: x / RBC: x / WBC x   Sq Epi: x / Non Sq Epi: x / Bacteria: x      CULTURES:  Culture Results:   Commensal marilee consistent with body site (06-16 @ 13:09)  Culture Results:   Culture is being performed. (06-16 @ 11:55)  Culture Results:   No growth (06-16 @ 11:55)  Culture Results:   No Legionella species isolated to date (06-16 @ 11:55)  Culture Results:   Commensal marilee consistent with body site (06-16 @ 11:53)      PHYSICAL EXAM:  General: Ill appearing  HEENT: Pupils equal, reactive to light.  Symmetric.  PULM: diminished b/l  CVS: Regular rate and rhythm  ABD: Soft, distended,   EXT: No edema, , Warm   NEURO: Lethargic, opens eyes to stimuli      RADIOLOGY:   < from: CT Chest No Cont (06.22.25 @ 16:10) >  IMPRESSION:    In comparison with 6/10/2025, Increased consolidation and ground-glass   opacity within right upper lobe. New large consolidations within left   upper lobe and left lower lobe. Small pleural effusions are increased.    --- End of Report ---    < end of copied text >

## 2025-06-22 NOTE — PROGRESS NOTE ADULT - SUBJECTIVE AND OBJECTIVE BOX
[facility] -- Sai Valle Pannella, Patel, Savella Goodger, Cohen  Office # [phone]      Follow Up:  Hypotension, aifb     Subjective/Observations:     No complaints of chest pain,  palpitations reported. No signs of orthopnea or PND.  remains on NC, is on home o2   + shortness of breath , wheezing     REVIEW OF SYSTEMS: All other review of systems is negative unless indicated above    PAST MEDICAL & SURGICAL HISTORY:  COPD (chronic obstructive pulmonary disease)      DM (diabetes mellitus)  diet-controlled      PAF (paroxysmal atrial fibrillation)  s/p ablation      Hiatal hernia      H/O aplastic anemia      History of IBS      H/O osteoporosis      HLD (hyperlipidemia)      PMR (polymyalgia rheumatica)      History of basal cell cancer      Chronic kidney disease (CKD)      Hypotension      Presence of IVC filter      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      History of immunodeficiency      Lumbar compression fracture      H/O hiatal hernia      H/O pulmonary fibrosis      H/O sinus bradycardia      History of fracture of right hip  "unoperable"      S/P hysterectomy      S/P foot surgery      S/P knee surgery      After cataract  bilateral eye cataract surgically removed      Closed right hip fracture  rigth hip ORIF 2016      S/P IVC filter  2016      S/P carpal tunnel release  Right  & 17      H/O kyphoplasty      Port-A-Cath in place  right chest          MEDICATIONS  (STANDING):  albuterol/ipratropium for Nebulization 3 milliLiter(s) Nebulizer every 6 hours  aMIOdarone    Tablet 100 milliGRAM(s) Oral daily  atorvastatin 10 milliGRAM(s) Oral at bedtime  chlorhexidine 2% Cloths 1 Application(s) Topical <User Schedule>  dextrose 50% Injectable 25 Gram(s) IV Push once  dextrose 50% Injectable 12.5 Gram(s) IV Push once  dextrose 50% Injectable 25 Gram(s) IV Push once  fluticasone propionate/ salmeterol 500-50 MICROgram(s) Diskus 1 Dose(s) Inhalation two times a day  gabapentin 400 milliGRAM(s) Oral every 12 hours  insulin lispro (ADMELOG) corrective regimen sliding scale   SubCutaneous three times a day before meals  insulin lispro (ADMELOG) corrective regimen sliding scale   SubCutaneous at bedtime  pantoprazole  Injectable 40 milliGRAM(s) IV Push daily  predniSONE   Tablet 5 milliGRAM(s) Oral daily  torsemide 20 milliGRAM(s) Oral daily    MEDICATIONS  (PRN):  acetaminophen     Tablet .. 650 milliGRAM(s) Oral every 6 hours PRN Mild Pain (1 - 3)  dextrose Oral Gel 15 Gram(s) Oral once PRN Blood Glucose LESS THAN 70 milliGRAM(s)/deciliter      Allergies    No Known Allergies    Intolerances        Vital Signs Last 24 Hrs  T(C): 36.6 (2025 05:00), Max: 37.1 (2025 20:08)  T(F): 97.8 (2025 05:00), Max: 98.8 (2025 20:08)  HR: 85 (2025 05:00) (82 - 93)  BP: 117/69 (2025 05:00) (117/69 - 130/69)  BP(mean): --  RR: 18 (2025 05:00) (18 - 20)  SpO2: 95% (2025 05:00) (95% - 97%)    Parameters below as of 2025 05:00  Patient On (Oxygen Delivery Method): nasal cannula  O2 Flow (L/min): 4      I&O's Summary    2025 07:01  -  2025 07:00  --------------------------------------------------------  IN: 120 mL / OUT: 1400 mL / NET: -1280 mL          PHYSICAL EXAM:  TELE: not on tele  Constitutional: NAD, awake and alert, well-developed  HEENT: Moist Mucous Membranes, Anicteric  Pulmonary: Non-labored, breath sounds are wheezing   Cardiovascular: Regular, S1 and S2 nl, No murmurs, rubs, gallops or clicks  Gastrointestinal: Bowel Sounds present, soft, nontender.   Lymph:+ peripheral edema.  Skin: No visible rashes or ulcers.  Psych:  Mood & affect appropriate    LABS: All Labs Reviewed:                        7.8    9.83  )-----------( 268      ( 2025 06:55 )             23.8                         8.5    11.98 )-----------( 323      ( 2025 07:50 )             26.2                         8.1    9.69  )-----------( 330      ( 2025 09:25 )             25.4     2025 06:55    144    |  105    |  30     ----------------------------<  115    3.7     |  33     |  0.94   2025 07:50    144    |  104    |  29     ----------------------------<  129    3.8     |  34     |  0.97   2025 09:25    142    |  106    |  35     ----------------------------<  142    4.3     |  30     |  1.20     Ca    8.5        2025 06:55  Ca    9.0        2025 07:50  Ca    8.5        2025 09:25  Phos  2.6       2025 06:55  Phos  2.3       2025 07:50  Phos  2.7       2025 09:25  Mg     1.9       2025 06:55  Mg     1.3       2025 07:50  Mg     1.7       2025 09:25    TPro  5.5    /  Alb  2.7    /  TBili  0.7    /  DBili  x      /  AST  13     /  ALT  18     /  AlkPhos  38     2025 06:55  TPro  6.8    /  Alb  3.0    /  TBili  1.0    /  DBili  x      /  AST  <3     /  ALT  23     /  AlkPhos  46     2025 07:50  TPro  6.4    /  Alb  2.8    /  TBili  0.7    /  DBili  x      /  AST  5      /  ALT  21     /  AlkPhos  47     2025 09:25    PT/INR - ( 2025 07:50 )   PT: 15.9 sec;   INR: 1.36 ratio           CARDIAC MARKERS ( 2025 06:55 )  x     / x     / x     / x     / <1.0 ng/mL         EC Lead ECG:   Ventricular Rate 88 BPM    Atrial Rate 88 BPM    P-R Interval 130 ms    QRS Duration 78 ms    Q-T Interval 370 ms    QTC Calculation(Bazett) 447 ms    P Axis 79 degrees    R Axis -48 degrees    T Axis 78 degrees    Diagnosis Line Sinus rhythm with premature atrial complexes  Left anterior fascicular block  Possible Lateral infarct , age undetermined (25 @ 04:43)      TRANSTHORACIC ECHOCARDIOGRAM REPORT  ________________________________________________________________________________                                      _______       Pt. Name:       MARTINEZ JONES Study Date:    2025  MRN:            [id number]        YOB: 1940  Accession #:    [id number]       Age:           84 years  Account#:       [id number]      Gender:        F  Heart Rate:                     Height:        62.99 in (160.00 cm)  Rhythm:                         Weight:        147.71 lb (67.00 kg)  Blood Pressure: 109/63 mmHg     BSA/BMI:       1.70 m² / 26.17 kg/m²  ________________________________________________________________________________________  Referring Physician:    [id number] Naresh Vitale  Interpreting Physician: Daniel Jorge M.D.  Primary Sonographer:    Jamal Hayes    CPT:               ECHO TTE WO CON COMP W DOPP - 69224.m  Indication(s):     Shock, unspecified - R57.9  Procedure:         Transthoracic echocardiogram with 2-D, M-mode and complete                     spectral and color flow Doppler.  Ordering Location: ICU1  Admission Status:  Inpatient  Study Information: Image quality for this study is technically difficult.    _______________________________________________________________________________________     CONCLUSIONS:      1. Technically difficult image quality.   2. Left ventricular systolic function is normal with an ejection fraction of 67 % by Arias's method of disks.   3. Mild left ventricular hypertrophy.   4.Normal right ventricular cavity size and probably normal right ventricular systolic function.   5. Tricuspid aortic valve with normal leaflet excursion. There is calcification of the aortic valve leaflets.   6. Moderate mitral valve leaflet calcification.   7. Mild mitral regurgitation.   8. Moderate tricuspid regurgitation.   9. The inferior vena cava is normal in size measuring 1.64 cm in diameter, (normal <2.1cm) with normal inspiratory collapse (normal >50%) consistent with normal right atrial pressure (~3, range 0-5mmHg).  10. Estimated pulmonary artery systolic pressure is 57 mmHg, consistent with moderate-to-severe pulmonary hypertension.  11. Trace pericardial effusion.  12. Right pleural effusion noted.  13. Compared to the transthoracic echocardiogram performed on 2025, there have been no significant interval changes.    ________________________________________________________________________________________  FINDINGS:     Left Ventricle:  Left ventricular systolic function is normal with a calculated ejection fraction of 67 % by the Arias's biplane method of disks. Mild left ventricular hypertrophy.     Right Ventricle:  The right ventricular cavity is normal in size and right ventricular systolic function is probablynormal. Tricuspid annular plane systolic excursion (TAPSE) is 1.9 cm (normal >=1.7 cm).     Left Atrium:  The left atrium is normal in size with an indexed volume of 29.06 ml/m².     Right Atrium:  The right atrium is normal in size with an indexed volume of 26.00 ml/m² and an indexed area of 9.41 cm²/m².     Interatrial Septum:  The interatrial septum appears intact.     Aortic Valve:  The aortic valve is tricuspid with normal leaflet excursion. There is calcification of the aortic valve leaflets. There is mild thickening of the aortic valve leaflets. There is trace aortic regurgitation.     Mitral Valve:  Structurally normal mitral valve with normal leaflet excursion. There is calcification of the mitral valve annulus. There is moderate leaflet calcification. There is mild mitral regurgitation.     Tricuspid Valve:  The tricuspid valve is structurally normal with normal leaflet excursion. There is moderate tricuspid regurgitation. Estimated pulmonary artery systolic pressure is 57 mmHg,consistent with moderate-to-severe pulmonary hypertension.     Pulmonic Valve:  The pulmonic valve was not well visualized. There is trace pulmonic regurgitation.     Aorta:  The aortic root at the sinuses of Valsalva is normal in size, measuring 3.30 cm (indexed 1.94 cm/m²). The ascending aorta is normal in size, measuring 3.00 cm (indexed 1.76 cm/m²). The aortic arch diameter is normal in size, measuring 2.4 cm (indexed 1.41 cm/m²).     Pericardium:  There is a trace pericardial effusion.     Pleura:  Right pleural effusion noted.     Systemic Veins:  The inferior vena cava is normal in size measuring 1.64 cm in diameter, (normal <2.1cm) with normal inspiratory collapse (normal >50%) consistent with normal right atrial pressure (~3, range 0-5mmHg).  ____________________________________________________________________  QUANTITATIVE DATA:  Left Ventricle Measurements: (Indexed to BSA)     IVSd (2D):   1.2 cm  LVPWd (2D):  1.1 cm  LVIDd (2D):  4.2 cm  LVIDs (2D):  2.4 cm  LV Mass:     169 g  99.5 g/m²  LV Vol d, MOD A2C: 38.9 ml 22.88 ml/m²  LV Vol d, MOD A4C: 40.9 ml 24.06 ml/m²  LV Vol d, MOD BP:  40.9 ml 24.04 ml/m²  LV Vol s, MOD A2C: 11.3 ml 6.65 ml/m²  LV Vol s, MOD A4C: 13.4 ml 7.88 ml/m²  LV Vol s, MOD BP:  13.4 ml 7.91 ml/m²  LVOT SV MOD BP:    27.4 ml  LV EF% MOD BP:     67 %     MV E Vmax:    1.54 m/s  MV A Vmax:    0.74 m/s  MV E/A:       2.08  e' lateral:   11.90 cm/s  e' medial:    8.81 cm/s  E/e' lateral: 12.94  E/e' medial:  17.48  E/e' Average: 14.87  MV DT:272 msec    Aorta Measurements: (Normal range) (Indexed to BSA)     Ao Root d     3.30 cm (2.7 - 3.3 cm) 1.94 cm/m²  Ao Asc d, 2D: 3.00  Ao Asc prox:  3.00 cm                1.76 cm/m²  Ao Arch:      2.4 cm            Left Atrium Measurements: (Indexed to BSA)  LA Diam 2D: 3.60 cm         Right Ventricle Measurements: Right Atrial Measurements:     TAPSE:            1.9 cm      RA Vol s, MOD A4C         44.2 ml  RV Base (RVID1):  2.9 cm      RA Vol s, MOD A4C i BSA   26.00 ml/m²  RV Mid (RVID2): 2.5 cm      RA Area s, MOD A4C        16.0 cm²  RV Major (RVID3): 6.4 cm      RA Area s, MOD A4C, i BSA 9.41 cm²/m²       LVOT / RVOT/ Qp/Qs Data: (Indexed to BSA)  LVOT Diameter,s: 2.20 cm  LVOT Area:       3.80 cm²    Mitral Valve Measurements:     MV E Vmax: 1.5 m/s         MR Vmax:          3.80 m/s  MV A Vmax: 0.7 m/s         MR Peak Gradient: 57.8 mmHg  MV E/A:    2.1       Tricuspid Valve Measurements:     TR Vmax:          3.7 m/s  TR Peak Gradient: 54.5 mmHg  RA Pressure:      3 mmHg  PASP:             57 mmHg       [facility] -- Sai Valle Pannella, Patel, Savella Goodger, Cohen  Office # [phone]      Follow Up:  Hypotension, aifb     Subjective/Observations:     No complaints of chest pain,  palpitations reported. No signs of orthopnea or PND.  remains on NC, is on home o2   + shortness of breath , wheezing     REVIEW OF SYSTEMS: All other review of systems is negative unless indicated above    PAST MEDICAL & SURGICAL HISTORY:  COPD (chronic obstructive pulmonary disease)      DM (diabetes mellitus)  diet-controlled      PAF (paroxysmal atrial fibrillation)  s/p ablation      Hiatal hernia      H/O aplastic anemia      History of IBS      H/O osteoporosis      HLD (hyperlipidemia)      PMR (polymyalgia rheumatica)      History of basal cell cancer      Chronic kidney disease (CKD)      Hypotension      Presence of IVC filter      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      History of immunodeficiency      Lumbar compression fracture      H/O hiatal hernia      H/O pulmonary fibrosis      H/O sinus bradycardia      History of fracture of right hip  "unoperable"      S/P hysterectomy      S/P foot surgery      S/P knee surgery      After cataract  bilateral eye cataract surgically removed      Closed right hip fracture  rigth hip ORIF 2016      S/P IVC filter  2016      S/P carpal tunnel release  Right  & 17      H/O kyphoplasty      Port-A-Cath in place  right chest          MEDICATIONS  (STANDING):  albuterol/ipratropium for Nebulization 3 milliLiter(s) Nebulizer every 6 hours  aMIOdarone    Tablet 100 milliGRAM(s) Oral daily  apixaban 2.5 tid  atorvastatin 10 milliGRAM(s) Oral at bedtime  chlorhexidine 2% Cloths 1 Application(s) Topical <User Schedule>  dextrose 50% Injectable 25 Gram(s) IV Push once  dextrose 50% Injectable 12.5 Gram(s) IV Push once  dextrose 50% Injectable 25 Gram(s) IV Push once  fluticasone propionate/ salmeterol 500-50 MICROgram(s) Diskus 1 Dose(s) Inhalation two times a day  gabapentin 400 milliGRAM(s) Oral every 12 hours  insulin lispro (ADMELOG) corrective regimen sliding scale   SubCutaneous three times a day before meals  insulin lispro (ADMELOG) corrective regimen sliding scale   SubCutaneous at bedtime  pantoprazole  Injectable 40 milliGRAM(s) IV Push daily  predniSONE   Tablet 5 milliGRAM(s) Oral daily  torsemide 20 milliGRAM(s) Oral daily    MEDICATIONS  (PRN):  acetaminophen     Tablet .. 650 milliGRAM(s) Oral every 6 hours PRN Mild Pain (1 - 3)  dextrose Oral Gel 15 Gram(s) Oral once PRN Blood Glucose LESS THAN 70 milliGRAM(s)/deciliter      Allergies    No Known Allergies    Intolerances        Vital Signs Last 24 Hrs  T(C): 36.6 (2025 05:00), Max: 37.1 (2025 20:08)  T(F): 97.8 (2025 05:00), Max: 98.8 (2025 20:08)  HR: 85 (2025 05:00) (82 - 93)  BP: 117/69 (2025 05:00) (117/69 - 130/69)  BP(mean): --  RR: 18 (2025 05:00) (18 - 20)  SpO2: 95% (2025 05:00) (95% - 97%)    Parameters below as of 2025 05:00  Patient On (Oxygen Delivery Method): nasal cannula  O2 Flow (L/min): 4      I&O's Summary    2025 07:01  -  2025 07:00  --------------------------------------------------------  IN: 120 mL / OUT: 1400 mL / NET: -1280 mL          PHYSICAL EXAM:  TELE: not on tele  Constitutional: NAD, awake and alert, well-developed  HEENT: Moist Mucous Membranes, Anicteric  Pulmonary: Non-labored, breath sounds are wheezing   Cardiovascular: Regular, S1 and S2 nl, No murmurs, rubs, gallops or clicks  Gastrointestinal: Bowel Sounds present, soft, nontender.   Lymph:+ peripheral edema.  Skin: No visible rashes or ulcers.  Psych:  Mood & affect appropriate    LABS: All Labs Reviewed:                        7.8    9.83  )-----------( 268      ( 2025 06:55 )             23.8                         8.5    11.98 )-----------( 323      ( 2025 07:50 )             26.2                         8.1    9.69  )-----------( 330      ( 2025 09:25 )             25.4     2025 06:55    144    |  105    |  30     ----------------------------<  115    3.7     |  33     |  0.94   2025 07:50    144    |  104    |  29     ----------------------------<  129    3.8     |  34     |  0.97   2025 09:25    142    |  106    |  35     ----------------------------<  142    4.3     |  30     |  1.20     Ca    8.5        2025 06:55  Ca    9.0        2025 07:50  Ca    8.5        2025 09:25  Phos  2.6       2025 06:55  Phos  2.3       2025 07:50  Phos  2.7       2025 09:25  Mg     1.9       2025 06:55  Mg     1.3       2025 07:50  Mg     1.7       2025 09:25    TPro  5.5    /  Alb  2.7    /  TBili  0.7    /  DBili  x      /  AST  13     /  ALT  18     /  AlkPhos  38     2025 06:55  TPro  6.8    /  Alb  3.0    /  TBili  1.0    /  DBili  x      /  AST  <3     /  ALT  23     /  AlkPhos  46     2025 07:50  TPro  6.4    /  Alb  2.8    /  TBili  0.7    /  DBili  x      /  AST  5      /  ALT  21     /  AlkPhos  47     2025 09:25    PT/INR - ( 2025 07:50 )   PT: 15.9 sec;   INR: 1.36 ratio           CARDIAC MARKERS ( 2025 06:55 )  x     / x     / x     / x     / <1.0 ng/mL         EC Lead ECG:   Ventricular Rate 88 BPM    Atrial Rate 88 BPM    P-R Interval 130 ms    QRS Duration 78 ms    Q-T Interval 370 ms    QTC Calculation(Bazett) 447 ms    P Axis 79 degrees    R Axis -48 degrees    T Axis 78 degrees    Diagnosis Line Sinus rhythm with premature atrial complexes  Left anterior fascicular block  Possible Lateral infarct , age undetermined (25 @ 04:43)      TRANSTHORACIC ECHOCARDIOGRAM REPORT  ________________________________________________________________________________                                      _______       Pt. Name:       MARTINEZ JONES Study Date:    2025  MRN:            [id number]        YOB: 1940  Accession #:    [id number]       Age:           84 years  Account#:       [id number]      Gender:        F  Heart Rate:                     Height:        62.99 in (160.00 cm)  Rhythm:                         Weight:        147.71 lb (67.00 kg)  Blood Pressure: 109/63 mmHg     BSA/BMI:       1.70 m² / 26.17 kg/m²  ________________________________________________________________________________________  Referring Physician:    [id number] Naresh Vitale  Interpreting Physician: Daniel Jorge M.D.  Primary Sonographer:    Jamal Hayes    CPT:               ECHO TTE WO CON COMP W DOPP - 40156.m  Indication(s):     Shock, unspecified - R57.9  Procedure:         Transthoracic echocardiogram with 2-D, M-mode and complete                     spectral and color flow Doppler.  Ordering Location: ICU1  Admission Status:  Inpatient  Study Information: Image quality for this study is technically difficult.    _______________________________________________________________________________________     CONCLUSIONS:      1. Technically difficult image quality.   2. Left ventricular systolic function is normal with an ejection fraction of 67 % by Arias's method of disks.   3. Mild left ventricular hypertrophy.   4.Normal right ventricular cavity size and probably normal right ventricular systolic function.   5. Tricuspid aortic valve with normal leaflet excursion. There is calcification of the aortic valve leaflets.   6. Moderate mitral valve leaflet calcification.   7. Mild mitral regurgitation.   8. Moderate tricuspid regurgitation.   9. The inferior vena cava is normal in size measuring 1.64 cm in diameter, (normal <2.1cm) with normal inspiratory collapse (normal >50%) consistent with normal right atrial pressure (~3, range 0-5mmHg).  10. Estimated pulmonary artery systolic pressure is 57 mmHg, consistent with moderate-to-severe pulmonary hypertension.  11. Trace pericardial effusion.  12. Right pleural effusion noted.  13. Compared to the transthoracic echocardiogram performed on 2025, there have been no significant interval changes.    ________________________________________________________________________________________  FINDINGS:     Left Ventricle:  Left ventricular systolic function is normal with a calculated ejection fraction of 67 % by the Arias's biplane method of disks. Mild left ventricular hypertrophy.     Right Ventricle:  The right ventricular cavity is normal in size and right ventricular systolic function is probablynormal. Tricuspid annular plane systolic excursion (TAPSE) is 1.9 cm (normal >=1.7 cm).     Left Atrium:  The left atrium is normal in size with an indexed volume of 29.06 ml/m².     Right Atrium:  The right atrium is normal in size with an indexed volume of 26.00 ml/m² and an indexed area of 9.41 cm²/m².     Interatrial Septum:  The interatrial septum appears intact.     Aortic Valve:  The aortic valve is tricuspid with normal leaflet excursion. There is calcification of the aortic valve leaflets. There is mild thickening of the aortic valve leaflets. There is trace aortic regurgitation.     Mitral Valve:  Structurally normal mitral valve with normal leaflet excursion. There is calcification of the mitral valve annulus. There is moderate leaflet calcification. There is mild mitral regurgitation.     Tricuspid Valve:  The tricuspid valve is structurally normal with normal leaflet excursion. There is moderate tricuspid regurgitation. Estimated pulmonary artery systolic pressure is 57 mmHg,consistent with moderate-to-severe pulmonary hypertension.     Pulmonic Valve:  The pulmonic valve was not well visualized. There is trace pulmonic regurgitation.     Aorta:  The aortic root at the sinuses of Valsalva is normal in size, measuring 3.30 cm (indexed 1.94 cm/m²). The ascending aorta is normal in size, measuring 3.00 cm (indexed 1.76 cm/m²). The aortic arch diameter is normal in size, measuring 2.4 cm (indexed 1.41 cm/m²).     Pericardium:  There is a trace pericardial effusion.     Pleura:  Right pleural effusion noted.     Systemic Veins:  The inferior vena cava is normal in size measuring 1.64 cm in diameter, (normal <2.1cm) with normal inspiratory collapse (normal >50%) consistent with normal right atrial pressure (~3, range 0-5mmHg).  ____________________________________________________________________  QUANTITATIVE DATA:  Left Ventricle Measurements: (Indexed to BSA)     IVSd (2D):   1.2 cm  LVPWd (2D):  1.1 cm  LVIDd (2D):  4.2 cm  LVIDs (2D):  2.4 cm  LV Mass:     169 g  99.5 g/m²  LV Vol d, MOD A2C: 38.9 ml 22.88 ml/m²  LV Vol d, MOD A4C: 40.9 ml 24.06 ml/m²  LV Vol d, MOD BP:  40.9 ml 24.04 ml/m²  LV Vol s, MOD A2C: 11.3 ml 6.65 ml/m²  LV Vol s, MOD A4C: 13.4 ml 7.88 ml/m²  LV Vol s, MOD BP:  13.4 ml 7.91 ml/m²  LVOT SV MOD BP:    27.4 ml  LV EF% MOD BP:     67 %     MV E Vmax:    1.54 m/s  MV A Vmax:    0.74 m/s  MV E/A:       2.08  e' lateral:   11.90 cm/s  e' medial:    8.81 cm/s  E/e' lateral: 12.94  E/e' medial:  17.48  E/e' Average: 14.87  MV DT:272 msec    Aorta Measurements: (Normal range) (Indexed to BSA)     Ao Root d     3.30 cm (2.7 - 3.3 cm) 1.94 cm/m²  Ao Asc d, 2D: 3.00  Ao Asc prox:  3.00 cm                1.76 cm/m²  Ao Arch:      2.4 cm            Left Atrium Measurements: (Indexed to BSA)  LA Diam 2D: 3.60 cm         Right Ventricle Measurements: Right Atrial Measurements:     TAPSE:            1.9 cm      RA Vol s, MOD A4C         44.2 ml  RV Base (RVID1):  2.9 cm      RA Vol s, MOD A4C i BSA   26.00 ml/m²  RV Mid (RVID2): 2.5 cm      RA Area s, MOD A4C        16.0 cm²  RV Major (RVID3): 6.4 cm      RA Area s, MOD A4C, i BSA 9.41 cm²/m²       LVOT / RVOT/ Qp/Qs Data: (Indexed to BSA)  LVOT Diameter,s: 2.20 cm  LVOT Area:       3.80 cm²    Mitral Valve Measurements:     MV E Vmax: 1.5 m/s         MR Vmax:          3.80 m/s  MV A Vmax: 0.7 m/s         MR Peak Gradient: 57.8 mmHg  MV E/A:    2.1       Tricuspid Valve Measurements:     TR Vmax:          3.7 m/s  TR Peak Gradient: 54.5 mmHg  RA Pressure:      3 mmHg  PASP:             57 mmHg

## 2025-06-22 NOTE — PROGRESS NOTE ADULT - SUBJECTIVE AND OBJECTIVE BOX
CHIEF COMPLAINT/ REASON FOR VISIT  .. Patient was seen to address the  issue listed under PROBLEM LIST which is located toward bottom of this note     MARTINEZ PATEL TELE 306 W1    Allergies    No Known Allergies    Intolerances        PAST MEDICAL & SURGICAL HISTORY:  COPD (chronic obstructive pulmonary disease)      DM (diabetes mellitus)  diet-controlled      PAF (paroxysmal atrial fibrillation)  s/p ablation      Hiatal hernia      H/O aplastic anemia      History of IBS      H/O osteoporosis      HLD (hyperlipidemia)      PMR (polymyalgia rheumatica)      History of basal cell cancer      Chronic kidney disease (CKD)      Hypotension      Presence of IVC filter      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      History of immunodeficiency      Lumbar compression fracture      H/O hiatal hernia      H/O pulmonary fibrosis      H/O sinus bradycardia      History of fracture of right hip  "unoperable"      S/P hysterectomy      S/P foot surgery      S/P knee surgery      After cataract  bilateral eye cataract surgically removed      Closed right hip fracture  rigth hip ORIF july 19 2016      S/P IVC filter  nov 2016      S/P carpal tunnel release  Right 2008 & 6/27/17      H/O kyphoplasty      Port-A-Cath in place  right chest          FAMILY HISTORY:  Family history of bladder cancer (Mother)    Family history of myocardial infarction (Father)    FH: atrial fibrillation (Father)        Home Medications:  amiodarone 200 mg oral tablet: 0.5 tab(s) orally once a day (08 Jun 2025 16:43)  Bentyl 10 mg oral capsule: 1 cap(s) orally 2 times a day, As Needed (08 Jun 2025 16:43)  Eliquis 2.5 mg oral tablet: 1 tab(s) orally 2 times a day (08 Jun 2025 16:43)  fluticasone-salmeterol 500 mcg-50 mcg/inh inhalation powder: 1 puff(s) inhaled 2 times a day (08 Jun 2025 16:43)  folic acid 1 mg oral tablet: 1 tab(s) orally once a day (in the morning) (08 Jun 2025 16:43)  gabapentin 400 mg oral capsule: 1 cap(s) orally 2 times a day (08 Jun 2025 16:43)  ipratropium-albuterol 0.5 mg-2.5 mg/3 mL inhalation solution: 3 milliliter(s) inhaled every 6 hours As needed Shortness of Breath and/or Wheezing (08 Jun 2025 16:43)  IVIG monthly:  (08 Jun 2025 16:43)  leflunomide 10 mg oral tablet: 1 tab(s) orally once a day (08 Jun 2025 16:43)  midodrine 10 mg oral tablet: 1 tab(s) orally every 8 hours (08 Jun 2025 16:43)  montelukast 10 mg oral tablet: 1 tab(s) orally once a day (08 Jun 2025 16:43)  pantoprazole 40 mg oral delayed release tablet: 1 tab(s) orally once a day (before a meal) (08 Jun 2025 16:43)  predniSONE 5 mg oral tablet: 1 tab(s) orally once a day (08 Jun 2025 16:43)  Procrit 10,000 units/mL preservative-free injectable solution: injectable once a week WEDNESDAY (08 Jun 2025 16:43)  Reclast Infusion , IV x 1 yearly:  (08 Jun 2025 16:43)  simvastatin 10 mg oral tablet: 1 tab(s) orally once a day (at bedtime) (08 Jun 2025 16:43)  tiotropium 2.5 mcg/inh inhalation aerosol: 2 puff(s) inhaled once a day (08 Jun 2025 16:43)  tiZANidine: 2 tab(s) orally once a day (at bedtime) as needed for  muscle spasm (08 Jun 2025 16:43)  torsemide 20 mg oral tablet: 1 tab(s) orally once a day (in the morning) (08 Jun 2025 16:49)      MEDICATIONS  (STANDING):  albuterol/ipratropium for Nebulization 3 milliLiter(s) Nebulizer every 6 hours  aMIOdarone    Tablet 100 milliGRAM(s) Oral daily  apixaban 2.5 milliGRAM(s) Oral two times a day  atorvastatin 10 milliGRAM(s) Oral at bedtime  chlorhexidine 2% Cloths 1 Application(s) Topical <User Schedule>  dextrose 50% Injectable 12.5 Gram(s) IV Push once  dextrose 50% Injectable 25 Gram(s) IV Push once  dextrose 50% Injectable 25 Gram(s) IV Push once  fluticasone propionate/ salmeterol 500-50 MICROgram(s) Diskus 1 Dose(s) Inhalation two times a day  gabapentin 400 milliGRAM(s) Oral every 12 hours  insulin lispro (ADMELOG) corrective regimen sliding scale   SubCutaneous three times a day before meals  insulin lispro (ADMELOG) corrective regimen sliding scale   SubCutaneous at bedtime  pantoprazole    Tablet 40 milliGRAM(s) Oral before breakfast  predniSONE   Tablet 5 milliGRAM(s) Oral daily    MEDICATIONS  (PRN):  acetaminophen     Tablet .. 650 milliGRAM(s) Oral every 6 hours PRN Mild Pain (1 - 3)  dextrose Oral Gel 15 Gram(s) Oral once PRN Blood Glucose LESS THAN 70 milliGRAM(s)/deciliter      Diet, Regular:   DASH/TLC Sodium & Cholesterol Restricted (06-16-25 @ 21:27) [Active]          Vital Signs Last 24 Hrs  T(C): 36.4 (22 Jun 2025 05:06), Max: 36.6 (21 Jun 2025 11:27)  T(F): 97.6 (22 Jun 2025 05:06), Max: 97.8 (21 Jun 2025 11:27)  HR: 76 (22 Jun 2025 06:06) (76 - 91)  BP: 131/78 (22 Jun 2025 05:06) (121/70 - 136/76)  BP(mean): --  RR: 18 (22 Jun 2025 05:06) (18 - 18)  SpO2: 96% (22 Jun 2025 06:06) (92% - 97%)    Parameters below as of 22 Jun 2025 05:06  Patient On (Oxygen Delivery Method): BiPAP/CPAP          06-20-25 @ 07:01  -  06-21-25 @ 07:00  --------------------------------------------------------  IN: 0 mL / OUT: 700 mL / NET: -700 mL              LABS:                        9.3    3.82  )-----------( 707      ( 21 Jun 2025 06:15 )             29.0     06-21    146[H]  |  103  |  30[H]  ----------------------------<  230[H]  3.9   |  39[H]  |  1.10    Ca    8.8      21 Jun 2025 09:35  Phos  2.7     06-21  Mg     1.7     06-21    TPro  5.4[L]  /  Alb  2.8[L]  /  TBili  0.6  /  DBili  x   /  AST  9[L]  /  ALT  21  /  AlkPhos  43  06-21      Urinalysis Basic - ( 21 Jun 2025 09:35 )    Color: x / Appearance: x / SG: x / pH: x  Gluc: 230 mg/dL / Ketone: x  / Bili: x / Urobili: x   Blood: x / Protein: x / Nitrite: x   Leuk Esterase: x / RBC: x / WBC x   Sq Epi: x / Non Sq Epi: x / Bacteria: x        ABG - ( 20 Jun 2025 16:00 )  pH, Arterial: 7.36  pH, Blood: x     /  pCO2: 71    /  pO2: 65    / HCO3: 40    / Base Excess: 14.7  /  SaO2: 95.2                WBC:  WBC Count: 3.82 K/uL (06-21 @ 06:15)  WBC Count: 8.70 K/uL (06-20 @ 08:15)  WBC Count: 9.83 K/uL (06-19 @ 06:55)  WBC Count: 11.98 K/uL (06-18 @ 07:50)      MICROBIOLOGY:  RECENT CULTURES:  06-16 Bronch Wash Bronchial Wash XXXX   Rare Squamous epithelial cells seen per low power field  No polymorphonuclear leukocytes seen per low power field  No organisms seen per oil power field   Commensal marilee consistent with body site    06-16 Bronch Wash Bronchial Wash XXXX XXXX   Culture is being performed.    06-16 Bronch Wash Bronchial Wash XXXX   Rare polymorphonuclear leukocytes per oil power field  No Squamous epithelial cells per oil power field  No organisms seen per oil power field   Commensal marilee consistent with body site                    Sodium:  Sodium: 146 mmol/L (06-21 @ 09:35)  Sodium: 144 mmol/L (06-20 @ 08:15)  Sodium: 144 mmol/L (06-19 @ 06:55)  Sodium: 144 mmol/L (06-18 @ 07:50)      1.10 mg/dL 06-21 @ 09:35  0.85 mg/dL 06-20 @ 08:15  0.94 mg/dL 06-19 @ 06:55  0.97 mg/dL 06-18 @ 07:50      Hemoglobin:  Hemoglobin: 9.3 g/dL (06-21 @ 06:15)  Hemoglobin: 9.2 g/dL (06-20 @ 08:15)  Hemoglobin: 7.8 g/dL (06-19 @ 06:55)  Hemoglobin: 8.5 g/dL (06-18 @ 07:50)      Platelets: Platelet Count - Automated: 707 K/uL (06-21 @ 06:15)  Platelet Count - Automated: 277 K/uL (06-20 @ 08:15)  Platelet Count - Automated: 268 K/uL (06-19 @ 06:55)  Platelet Count - Automated: 323 K/uL (06-18 @ 07:50)      LIVER FUNCTIONS - ( 21 Jun 2025 09:35 )  Alb: 2.8 g/dL / Pro: 5.4 g/dL / ALK PHOS: 43 U/L / ALT: 21 U/L / AST: 9 U/L / GGT: x             Urinalysis Basic - ( 21 Jun 2025 09:35 )    Color: x / Appearance: x / SG: x / pH: x  Gluc: 230 mg/dL / Ketone: x  / Bili: x / Urobili: x   Blood: x / Protein: x / Nitrite: x   Leuk Esterase: x / RBC: x / WBC x   Sq Epi: x / Non Sq Epi: x / Bacteria: x        RADIOLOGY & ADDITIONAL STUDIES:      MICROBIOLOGY:  RECENT CULTURES:  06-16 Bronch Wash Bronchial Wash XXXX   Rare Squamous epithelial cells seen per low power field  No polymorphonuclear leukocytes seen per low power field  No organisms seen per oil power field   Commensal marilee consistent with body site    06-16 Bronch Wash Bronchial Wash XXXX XXXX   Culture is being performed.    06-16 Bronch Wash Bronchial Wash XXXX   Rare polymorphonuclear leukocytes per oil power field  No Squamous epithelial cells per oil power field  No organisms seen per oil power field   Commensal marilee consistent with body site

## 2025-06-22 NOTE — PROGRESS NOTE ADULT - PROBLEM SELECTOR PLAN 2
In the ED, tmax of 101 w/ KIMBERLY on CKD with Cr 1.90 (baseline 1.2-1.6), Hypotensive with SBP 80's (pt on midodrine therapy, usual 's). also w/ leukocytosis of 32.33. Procalcitonin of 13.6, RVP neg. Pt treated in ICU for septic shock requiring pressors, downgraded from the ICU to telemetry on 6/10  - CT chest 6/10 with multifocal R sided PNA  - completed zosyn on 06/18; monitor off abx   - completed weaning steroids on 06/18,  c/w home prednisone   - quant (negative) and fungitell 52 (negative)  - MRSA/MSSA PCR negative  - trend WBC and fever curve, supplemental O2 prn to maintain SPO2 > 92% -- taper o2  - ID consulted Dr Juan A varma appreciated In the ED, tmax of 101 w/ KIMBERLY on CKD with Cr 1.90 (baseline 1.2-1.6), Hypotensive with SBP 80's (pt on midodrine therapy, usual 's). also w/ leukocytosis of 32.33. Procalcitonin of 13.6, RVP neg. Pt treated in ICU for septic shock requiring pressors, downgraded from the ICU to telemetry on 6/10  - CT chest 6/10 with multifocal R sided PNA  - completed zosyn on 06/18; restarted abx as per ICU recommendations-- received 1x vanc and restarted zosyn   - completed weaning steroids on 06/18,  c/w home prednisone   - quant (negative) and fungitell 52 (negative)  - MRSA/MSSA PCR negative  - trend WBC and fever curve, supplemental O2 prn to maintain SPO2 > 92% -- taper o2  - ID consulted Dr Juan A varma appreciated

## 2025-06-22 NOTE — PROGRESS NOTE ADULT - PROBLEM SELECTOR PLAN 1
Patient found to have hemoptysis possible sec to full ac and cough   - Bronch on 06/16-- showed pooling of blood in RUL, no lesions noted   - Heme onc following dr montano   - Pulm  dr palm following post bronch - neg cult  - Hemoptysis resolved - resumed on Eliquis Patient found to have hemoptysis possible sec to full ac and cough   - Bronch on 06/16-- showed pooling of blood in RUL, no lesions noted   - Heme onc following dr montano   - Pulm  dr palm following post bronch - neg cult  - Hemoptysis resolved - could not tolerate  Eliquis worsening opacity may be  possible rt upper lobe bleed Patient found to have hemoptysis possible sec to full ac and cough   - Bronch on 06/16-- showed pooling of blood in RUL, no lesions noted   - Heme onc following dr montano   - Pulm  dr palm following post bronch - neg cult  - Hemoptysis resolved - could not tolerate  Eliquis worsening opacity may be  possible rt upper lobe bleeding-- will hold eliquis for now-- CT chest ordered for today

## 2025-06-22 NOTE — CONSULT NOTE ADULT - SUBJECTIVE AND OBJECTIVE BOX
HPI:  84 female PMH of A-fib on Eliquis, COPD, CKD, aplastic anemia, polymyalgia rheumatica, on chronic steroids, avascular necrosis of hips, HLD, HTN, DM, recent admission to [facility]  through 2025 for CHF/fluid overload/COPD exacerbation, presents to the ED form [facility] for evaluation of fever and generalized feeling of being unwell, found to be septic and hypotensive on  admission, admitted for ICU briefly for vasopressors support and placed on floo. MICU reconsulted on  for hypoxemia/hypercapnia overnight.      FAMILY HISTORY:  Family history of bladder cancer (Mother)    Family history of myocardial infarction (Father)    FH: atrial fibrillation (Father)        SOCIAL HISTORY:  Smokin-30 year history, quit >20 years ago    Allergies    No Known Allergies    Intolerances        HOME MEDICATIONS:    REVIEW OF SYSTEMS:  [x] All other systems negative  [ ] Unable to assess ROS because ________    OBJECTIVE:  ICU Vital Signs Last 24 Hrs  T(C): 36.4 (2025 05:06), Max: 36.4 (2025 05:06)  T(F): 97.6 (2025 05:06), Max: 97.6 (2025 05:06)  HR: 78 (2025 08:15) (75 - 91)  BP: 131/78 (2025 05:06) (131/78 - 136/76)  BP(mean): --  ABP: --  ABP(mean): --  RR: 18 (2025 05:06) (18 - 18)  SpO2: 95% (2025 08:15) (92% - 97%)    O2 Parameters below as of 2025 08:15  Patient On (Oxygen Delivery Method): nasal cannula, high flow              CAPILLARY BLOOD GLUCOSE      POCT Blood Glucose.: 119 mg/dL (2025 12:31)      PHYSICAL EXAM:  GENERAL: NAD, well-developed, mild increased WOB  HEAD:  Atraumatic, Normocephalic  EYES: EOMI, PERRLA, conjunctiva and sclera clear  NECK: Supple, No JVD  CHEST/LUNG: end exp wheezing b/l, left sided crackles most pronounced in the LOUISA.  HEART: Regular rate and rhythm; No murmurs, rubs, or gallops  ABDOMEN: Soft, Nontender, Nondistended; Bowel sounds present  EXTREMITIES:  2+ Peripheral Pulses, No clubbing, cyanosis, or edema  PSYCH: AAOx3  NEUROLOGY: non-focal  SKIN: No rashes or lesions      HOSPITAL MEDICATIONS:  MEDICATIONS  (STANDING):  albuterol/ipratropium for Nebulization 3 milliLiter(s) Nebulizer every 6 hours  aMIOdarone    Tablet 100 milliGRAM(s) Oral daily  atorvastatin 10 milliGRAM(s) Oral at bedtime  chlorhexidine 2% Cloths 1 Application(s) Topical <User Schedule>  dextrose 50% Injectable 25 Gram(s) IV Push once  dextrose 50% Injectable 12.5 Gram(s) IV Push once  dextrose 50% Injectable 25 Gram(s) IV Push once  fluticasone propionate/ salmeterol 500-50 MICROgram(s) Diskus 1 Dose(s) Inhalation two times a day  furosemide   Injectable 40 milliGRAM(s) IV Push daily  gabapentin 400 milliGRAM(s) Oral every 12 hours  insulin lispro (ADMELOG) corrective regimen sliding scale   SubCutaneous three times a day before meals  insulin lispro (ADMELOG) corrective regimen sliding scale   SubCutaneous at bedtime  pantoprazole    Tablet 40 milliGRAM(s) Oral before breakfast  predniSONE   Tablet 5 milliGRAM(s) Oral daily    MEDICATIONS  (PRN):  acetaminophen     Tablet .. 650 milliGRAM(s) Oral every 6 hours PRN Mild Pain (1 - 3)  dextrose Oral Gel 15 Gram(s) Oral once PRN Blood Glucose LESS THAN 70 milliGRAM(s)/deciliter      LABS:                        8.3    7.28  )-----------( 253      ( 2025 07:40 )             26.4     -    146[H]  |  104  |  31[H]  ----------------------------<  117[H]  4.3   |  39[H]  |  0.88    Ca    9.0      2025 07:40  Phos  2.7     06-  Mg     1.7     -    TPro  5.9[L]  /  Alb  2.9[L]  /  TBili  0.7  /  DBili  x   /  AST  <3[L]  /  ALT  21  /  AlkPhos  58        Urinalysis Basic - ( 2025 07:40 )    Color: x / Appearance: x / SG: x / pH: x  Gluc: 117 mg/dL / Ketone: x  / Bili: x / Urobili: x   Blood: x / Protein: x / Nitrite: x   Leuk Esterase: x / RBC: x / WBC x   Sq Epi: x / Non Sq Epi: x / Bacteria: x      Arterial Blood Gas:   @ 16:00  7.36/71/65/40/95.2/14.7  ABG lactate: --        MICROBIOLOGY:     RADIOLOGY:  [ ] Reviewed and interpreted by me    EKG: Breath sounds clear and equal bilaterally.

## 2025-06-22 NOTE — PROGRESS NOTE ADULT - PROBLEM SELECTOR PLAN 8
-chronic recent hospitalization 5/25/25-6/5/25 for AHDF/COPD exacerbation  -Bronchodilators q 6 hrs prn for sob/wheezes  -c/w home med of Advair 500/50 q 12 hrs  -c/w home med tiotropium   -Supplemental O2 prn to maintain SPO2 > 92% -- taper o2   pt s/p acute hypercarbic  hypoxic respiratory failure / s/p bipap - resolved on o2 via nc

## 2025-06-22 NOTE — SOCIAL WORK PROGRESS NOTE - NSSWPROGRESSNOTE_GEN_ALL_CORE
DOROTA tasked to follow up on a potential dc to [facility]. DOROTA spoke with Dr Pepe who reports pt remains medically active requiring high flow oxygen at this time. DOROTA notified [facility] that dc is on hold. DOROTA to remains available to coordinate care as needed.

## 2025-06-22 NOTE — PROGRESS NOTE ADULT - ASSESSMENT
84F h/o AFib on Eliquis, COPD, CKD, aplastic anemia, polymyalgia rheumatica on chronic steroids, avascular necrosis of hips, HTN, HLD, with recent admission to [facility] 5/26 - 6/5 for acute HFpEF exacerbation and COPD exacerbation, discharged to rehab on 6/5 and returning today with right sided chest pain, general malaise and feeling unwell. She reports some slight cough though otherwise no other new symptoms reported. Found to be hypotensive, febrile w/ leukocytosis. Admitted to ICU for septic shock likely 2/2 to PNA and KIMBERLY. Now stable for downgrade to tele.

## 2025-06-22 NOTE — PROGRESS NOTE ADULT - ASSESSMENT
84 female PMH of A-fib on Eliquis, COPD, CKD, aplastic anemia, polymyalgia rheumatica, on chronic steroids, avascular necrosis of hips, HLD, HTN, DM, recent admission to [facility] 5/26 through 6/5/2025 for CHF/fluid overload/COPD exacerbation, presents to the ED form [facility] for evaluation of fever and generalized feeling of being unwell, found to be septic and hypotensive.     Septic shock likely 2/2 to PNA and KIMBERLY.  - s/p ICU and pressor  - CT chest 6/10 with multifocal R sided PNA  - Pulm following.  Initiate incentive spirometer  -consider repeating chest xray , wheezing on exam, fu pulm  -hx copd on home o2 , baseline o2 keep > 88%   - s/p bronch, 6/16.  Showed blood oozing from RUL but no lesion.  Still with minimal hemoptysis  - remains on NC  -fu ID     - c/o atypical CP Appears pleuritic  - EKG x 2 showed NSR with PAC, non-ischemic.  Troponin negative   - Back on home statin for Hx HLD    - No significant sign fluid overload on exam but with trace edema  - Back on home Torsemide 20 mg daily. 6/19 given extra dose  -FU am labs , hypernatremia noted  - Echo 6/12/25 as above EF 67% mild LVH, mod MAC, mild MR, mod to severe pHTN    - Bp remains stable    - Now off Midodrine and Northera    - Hx of paroxysmal atrial fibrillation and DVT/PE  - restarted on ac with eliquis . monitor counts closely  - Continue Amiodarone 100 mg daily    - Monitor and replete electrolytes. Keep K>4.0 and Mg>2.0.  - Will continue to follow     84 female PMH of A-fib on Eliquis, COPD, CKD, aplastic anemia, polymyalgia rheumatica, on chronic steroids, avascular necrosis of hips, HLD, HTN, DM, recent admission to [facility] 5/26 through 6/5/2025 for CHF/fluid overload/COPD exacerbation, presents to the ED form [facility] for evaluation of fever and generalized feeling of being unwell, found to be septic and hypotensive.     Septic shock likely 2/2 to PNA and KIMBERLY.  - s/p ICU and pressor  - CT chest 6/10 with multifocal R sided PNA  - Pulm following.  Initiate incentive spirometer  -consider repeating chest xray , wheezing on exam, fu pulm  -hx copd on home o2 , baseline o2 keep > 88%   - s/p bronch, 6/16.  Showed blood oozing from RUL but no lesion.  Still with minimal hemoptysis  - remains on NC  -fu ID     - c/o atypical CP Appears pleuritic  - EKG x 2 showed NSR with PAC, non-ischemic.  Troponin negative   - Back on home statin for Hx HLD    - No significant sign fluid overload on exam but with trace edema  - Back on home Torsemide 20 mg daily. 6/19 given extra dose  - FU am labs , hypernatremia noted  - Echo 6/12/25 as above EF 67% mild LVH, mod MAC, mild MR, mod to severe pHTN    - Bp remains stable    - Now off Midodrine and Northera    - Hx of paroxysmal atrial fibrillation and DVT/PE  - restarted on ac with eliquis. monitor counts closely  - Continue Amiodarone 100 mg daily    - Monitor and replete electrolytes. Keep K>4.0 and Mg>2.0.  - Will continue to follow

## 2025-06-22 NOTE — CHART NOTE - NSCHARTNOTEFT_GEN_A_CORE
Rapid response was called at _____ for _____.    Events leading up to Rapid Response:  Patient was seen and examined at the bedside by the rapid response team.  ___ () / ICU PA at bedside.    Rapid Response Vital Signs:    BP:  HR:  RR:  SpO2: % on  Temp:  FS:      Physical Exam:  Gen: ill appearing, in some resp distress  HEENT: NCAT, PEERLA b/l, EOMI b/l  Cardio: RRR, +s1s2, no murmurs, rubs, or gallops  Pulm: reduced BS B/L with crackles L. On HFNC  Abdomen: soft, nontender, nondistended, dec BS  Extremities: 1+ edema, no cyanosis, +2 pedal pulses  Neuro: aware to self, responds to verbal stimuli, but unable to converse and minimally follows commands  Skin: warm and dry      Assessment/Plan:   _____ year old _____. Rapid response called for _______.    - ______ likely 2/2 ______    -Discussed with  _______, agrees with above plan  -Family updated by ____  -RN to call if any changes      Addendum:     RAPID RESPONSE FOLLOW UP NOTE:    ASSESSMENT/ PLAN:        - RN to call with any changes.     DISPOSITION:  - MICU Rapid response was called at _____ for _____.    Events leading up to Rapid Response:  Patient was seen and examined at the bedside by the rapid response team.  ___ () / ICU PA at bedside.    Rapid Response Vital Signs:    BP: 148/67  HR: 90  RR: 20  SpO2: 95% on HFNC  Temp: 97.6  FS: 177      Physical Exam:  Gen: ill appearing, in some resp distress, diaphoretic  HEENT: NCAT, PEERLA b/l, EOMI b/l  Cardio: RRR, +s1s2, no murmurs, rubs, or gallops  Pulm: reduced BS B/L with crackles L. On HFNC  Abdomen: soft, nontender, nondistended, dec BS  Extremities: 1+ edema, no cyanosis, +2 pedal pulses  Neuro: aware to self, responds to verbal stimuli, but unable to converse and minimally follows commands  Skin: warm and dry      Assessment/Plan:   _____ year old _____. Rapid response called for _______.    - ______ likely 2/2 ______    -Discussed with  _______, agrees with above plan  -Family updated by ____  -RN to call if any changes      Addendum:     RAPID RESPONSE FOLLOW UP NOTE:    ASSESSMENT/ PLAN:        - RN to call with any changes.     DISPOSITION:  - MICU Rapid response was called at 19:24 for AMS.    Events leading up to Rapid Response:  Patient was seen and examined at the bedside by the rapid response team. Dr. Vuong / ICU YONI Meraz at bedside. Patient admitted for sepsis, hypoxia requiring ICU Pressor support. Patient on HFNC, however appears way more out of it and altered per daughter and bedside and     Rapid Response Vital Signs:    BP: 148/67  HR: 90  RR: 20  SpO2: 95% on HFNC  Temp: 97.6  FS: 177      Physical Exam:  Gen: ill appearing, in some resp distress, diaphoretic  HEENT: NCAT, PEERLA b/l, EOMI b/l  Cardio: RRR, +s1s2, no murmurs, rubs, or gallops  Pulm: reduced BS B/L with crackles L. On HFNC  Abdomen: soft, nontender, nondistended, dec BS  Extremities: 1+ edema, no cyanosis, +2 pedal pulses  Neuro: aware to self, responds to verbal stimuli, but unable to converse and minimally follows commands  Skin: warm and dry      Assessment/Plan:   _____ year old _____. Rapid response called for _______.    - ______ likely 2/2 ______    -Discussed with  _______, agrees with above plan  -Family updated by ____  -RN to call if any changes      Addendum:     RAPID RESPONSE FOLLOW UP NOTE:    ASSESSMENT/ PLAN:        - RN to call with any changes.     DISPOSITION:  - MICU Rapid response was called at 19:24 for AMS.    Events leading up to Rapid Response:  Patient was seen and examined at the bedside by the rapid response team. Dr. Vunog / ICU Y[facility] Meraz at bedside. Patient admitted for sepsis, hypoxia requiring ICU Pressor support. Patient on HFNC, however appears way more out of it and altered per daughter at bedside. RN assigned reports patient previously AAOx4 and talkative. On 40L/60% fio2 spo2 90%. Pt is nearly obtunded opens eyes to stimulation. According to her daughter at bedside has been refusing BIPAP.     Rapid Response Vital Signs:    BP: 148/67  HR: 90  RR: 20  SpO2: 95% on HFNC  Temp: 97.6  FS: 177      Physical Exam:  Gen: ill appearing, in some resp distress, diaphoretic  HEENT: NCAT, PEERLA b/l, EOMI b/l  Cardio: RRR, +s1s2, no murmurs, rubs, or gallops  Pulm: reduced BS B/L with crackles L. On HFNC  Abdomen: soft, nontender, nondistended, dec BS  Extremities: 1+ edema, no cyanosis, +2 pedal pulses  Neuro: aware to self, responds to verbal stimuli, but unable to converse and minimally follows commands  Skin: warm and dry      Assessment/Plan:   84 female with history of A-fib on Eliquis, COPD, CKD, aplastic anemia, polymyalgia rheumatica, on chronic steroids, avascular necrosis of hips, HLD, HTN, DM, recent admission to [place] 5/26 through 6/5/2025 for CHF/fluid overload/COPD exacerbation, present to the ED form [facility] for evaluation of fever and generalized feeling of being unwell, found to be septic and hypotensive on 6/8 admission, admitted for ICU briefly for vasopressors support and placed on floor.  Rapid response called for AMS    BIPAP Ordered, ABG/VBG to be done by ICU via femoral  - ICU PA at bedside to transfer to ICU for further monitoring  -Discussed with Dr. Vuong, agrees with above plan  -Family updated at bedside  -RN to call if any changes      Addendum:     RAPID RESPONSE FOLLOW UP NOTE:    ASSESSMENT/ PLAN:        - RN to call with any changes.     DISPOSITION:  - MICU Rapid response was called at 19:24 for AMS.    Events leading up to Rapid Response:  Patient was seen and examined at the bedside by the rapid response team. Dr. Vuong / ICU YONI Meraz at bedside. Patient admitted for sepsis, hypoxia requiring ICU Pressor support. Patient on HFNC, however appears way more out of it and altered per daughter at bedside. RN assigned reports patient previously AAOx4 and talkative. On 40L/60% fio2 spo2 90%. Pt is nearly obtunded opens eyes to stimulation. According to her daughter at bedside has been refusing BIPAP.     Rapid Response Vital Signs:    BP: 148/67  HR: 90  RR: 20  SpO2: 95% on HFNC  Temp: 97.6  FS: 177      Physical Exam:  Gen: ill appearing, in some resp distress, diaphoretic  HEENT: NCAT, PEERLA b/l, EOMI b/l  Cardio: RRR, +s1s2, no murmurs, rubs, or gallops  Pulm: reduced BS B/L with crackles L. On HFNC  Abdomen: soft, nontender, nondistended, dec BS  Extremities: 1+ edema, no cyanosis, +2 pedal pulses  Neuro: aware to self, responds to verbal stimuli, but unable to converse and minimally follows commands  Skin: warm and dry      Assessment/Plan:   84 female with history of A-fib on Eliquis, COPD, CKD, aplastic anemia, polymyalgia rheumatica, on chronic steroids, avascular necrosis of hips, HLD, HTN, DM, recent admission to [place] 5/26 through 6/5/2025 for CHF/fluid overload/COPD exacerbation, present to the ED form [facility] for evaluation of fever and generalized feeling of being unwell, found to be septic and hypotensive on 6/8 admission, admitted for ICU briefly for vasopressors support and placed on floor.  Rapid response called for AMS    BIPAP Ordered, ABG/VBG to be done by ICU via femoral  - ICU PA at bedside to transfer to ICU for further monitoring  -Discussed with Dr. Vuong, agrees with above plan  -Family updated at bedside  -RN to call if any changes    --2HOUR FOLLOW UP--  Patient seen 2 hours post follow up in ICU  - ABG PH7.31, PC02 82, PO2 153, HCO3 41.3  - On precedex as needed per ICU  - Started on BIPAP settings per ICU  - Started on stress dose steroids solucortef

## 2025-06-22 NOTE — CONSULT NOTE ADULT - ASSESSMENT
Assessment:  84 female PMH of A-fib on Eliquis, COPD, CKD, aplastic anemia, polymyalgia rheumatica, on chronic steroids, avascular necrosis of hips, HLD, HTN, DM, recent admission to [place] 5/26 through 6/5/2025 for CHF/fluid overload/COPD exacerbation, presents to the ED form [facility] for evaluation of fever and generalized feeling of being unwell, found to be septic and hypotensive on 6/8 admission, admitted for ICU briefly for vasopressors support and placed on floor. MICU reconsulted on 6/22 for hypoxemia/hypercapnia overnight.      Recommendations:   - patient's SpO2 96% on HFNC with FiO2 30% at time of MICU eval   - c/w HFNC, wean to NC as tolerated with SpO2 goal 88-92% given COPD history   - agree with nocturnal NIPPV   - given c/f possible PNA recurrence would restart empiric abx and disucss with ID   - will order hypertonic saline nebs, airway clearance, chest PT for secretion mobilization   - c/w duonebs, advair, prednisone   - can trend VBG pCO2 if hypercapnia is concern, patient without overt hypoxemia and reliable SpO2 readings   - patient does not require MICU level care at this time, please reconsult as needed.  Assessment:  84 female PMH of A-fib on Eliquis, COPD, CKD, aplastic anemia, polymyalgia rheumatica, on chronic steroids, avascular necrosis of hips, HLD, HTN, DM, recent admission to [place] 5/26 through 6/5/2025 for CHF/fluid overload/COPD exacerbation, presents to the ED form [facility] for evaluation of fever and generalized feeling of being unwell, found to be septic and hypotensive on 6/8 admission, admitted for ICU briefly for vasopressors support and placed on floor. MICU reconsulted on 6/22 for hypoxemia/hypercapnia overnight.      Recommendations:   - patient's SpO2 96% on HFNC with FiO2 30% at time of MICU eval   - c/w HFNC, wean to NC as tolerated with SpO2 goal 88-92% given smoking history   - agree with nocturnal NIPPV   - given c/f possible PNA recurrence would restart empiric abx and discuss with ID   - will order hypertonic saline nebs, airway clearance, chest PT for secretion mobilization   - c/w duonebs, advair, prednisone   - can trend VBG pCO2 if hypercapnia is concern, patient without overt hypoxemia and reliable SpO2 readings   - patient does not require MICU level care at this time, please reconsult as needed.

## 2025-06-22 NOTE — PROGRESS NOTE ADULT - SUBJECTIVE AND OBJECTIVE BOX
Patient is a 84y old  Female who presents with a chief complaint of septic shock, PNA (22 Jun 2025 08:03)    --incomplete--    INTERVAL HPI/OVERNIGHT EVENTS: Patient was placed on bipap overnight. Pt seen and examined at the bedside this AM, was on high rochelle. Denied any acute concerns. X-ray ordered this AM.     MEDICATIONS  (STANDING):  albuterol/ipratropium for Nebulization 3 milliLiter(s) Nebulizer every 6 hours  aMIOdarone    Tablet 100 milliGRAM(s) Oral daily  atorvastatin 10 milliGRAM(s) Oral at bedtime  chlorhexidine 2% Cloths 1 Application(s) Topical <User Schedule>  dextrose 50% Injectable 25 Gram(s) IV Push once  dextrose 50% Injectable 12.5 Gram(s) IV Push once  dextrose 50% Injectable 25 Gram(s) IV Push once  fluticasone propionate/ salmeterol 500-50 MICROgram(s) Diskus 1 Dose(s) Inhalation two times a day  gabapentin 400 milliGRAM(s) Oral every 12 hours  insulin lispro (ADMELOG) corrective regimen sliding scale   SubCutaneous three times a day before meals  insulin lispro (ADMELOG) corrective regimen sliding scale   SubCutaneous at bedtime  pantoprazole    Tablet 40 milliGRAM(s) Oral before breakfast  predniSONE   Tablet 5 milliGRAM(s) Oral daily    MEDICATIONS  (PRN):  acetaminophen     Tablet .. 650 milliGRAM(s) Oral every 6 hours PRN Mild Pain (1 - 3)  dextrose Oral Gel 15 Gram(s) Oral once PRN Blood Glucose LESS THAN 70 milliGRAM(s)/deciliter      Allergies    No Known Allergies    Intolerances        REVIEW OF SYSTEMS: Denies other than mentioned in HPI    Vital Signs Last 24 Hrs  T(C): 36.4 (22 Jun 2025 05:06), Max: 36.6 (21 Jun 2025 11:27)  T(F): 97.6 (22 Jun 2025 05:06), Max: 97.8 (21 Jun 2025 11:27)  HR: 78 (22 Jun 2025 08:15) (75 - 91)  BP: 131/78 (22 Jun 2025 05:06) (121/70 - 136/76)  BP(mean): --  RR: 18 (22 Jun 2025 05:06) (18 - 18)  SpO2: 95% (22 Jun 2025 08:15) (92% - 97%)    Parameters below as of 22 Jun 2025 08:15  Patient On (Oxygen Delivery Method): nasal cannula, high flow      PHYSICAL EXAM:  GENERAL: NAD, elderly female, receiving duoneb  HEENT:  anicteric, moist mucous membranes  CHEST/LUNG:  Rhonchi notable b/l  HEART:  RRR, S1, S2  ABDOMEN:  BS+, soft, nontender, nondistended  MUSCULOSKELETAL: trace LE edema bilaterally  NEUROLOGIC: answers questions and follows commands appropriately    LABS:                        8.3    7.28  )-----------( 253      ( 22 Jun 2025 07:40 )             26.4     CBC Full  -  ( 22 Jun 2025 07:40 )  WBC Count : 7.28 K/uL  Hemoglobin : 8.3 g/dL  Hematocrit : 26.4 %  Platelet Count - Automated : 253 K/uL  Mean Cell Volume : 98.1 fl  Mean Cell Hemoglobin : 30.9 pg  Mean Cell Hemoglobin Concentration : 31.4 g/dL  Auto Neutrophil # : 5.40 K/uL  Auto Lymphocyte # : 0.45 K/uL  Auto Monocyte # : 1.35 K/uL  Auto Eosinophil # : 0.00 K/uL  Auto Basophil # : 0.01 K/uL  Auto Neutrophil % : 74.2 %  Auto Lymphocyte % : 6.2 %  Auto Monocyte % : 18.5 %  Auto Eosinophil % : 0.0 %  Auto Basophil % : 0.1 %    22 Jun 2025 07:40    146    |  104    |  31     ----------------------------<  117    4.3     |  39     |  0.88     Ca    9.0        22 Jun 2025 07:40    TPro  5.9    /  Alb  2.9    /  TBili  0.7    /  DBili  x      /  AST  <3     /  ALT  21     /  AlkPhos  58     22 Jun 2025 07:40      Urinalysis Basic - ( 22 Jun 2025 07:40 )    Color: x / Appearance: x / SG: x / pH: x  Gluc: 117 mg/dL / Ketone: x  / Bili: x / Urobili: x   Blood: x / Protein: x / Nitrite: x   Leuk Esterase: x / RBC: x / WBC x   Sq Epi: x / Non Sq Epi: x / Bacteria: x      CAPILLARY BLOOD GLUCOSE      POCT Blood Glucose.: 137 mg/dL (22 Jun 2025 07:58)  POCT Blood Glucose.: 129 mg/dL (21 Jun 2025 21:37)  POCT Blood Glucose.: 118 mg/dL (21 Jun 2025 16:36)  POCT Blood Glucose.: 119 mg/dL (21 Jun 2025 11:58)        Culture - Bronchial (collected 06-16-25 @ 13:09)  Source: Bronch Wash Bronchial Wash  Gram Stain (06-16-25 @ 21:58):    Rare Squamous epithelial cells seen per low power field    No polymorphonuclear leukocytes seen per low power field    No organisms seen per oil power field  Final Report (06-18-25 @ 10:28):    Commensal marilee consistent with body site    Culture - Acid Fast - Bronchial w/Smear (collected 06-16-25 @ 11:55)  Source: Bronch Wash Bronchial Wash  Preliminary Report (06-18-25 @ 23:07):    Culture is being performed.    Culture - Fungal, Bronchial (collected 06-16-25 @ 11:55)  Source: Bronchial Bronchial Wash  Preliminary Report (06-19-25 @ 08:55):    No growth    Culture - Legionella (collected 06-16-25 @ 11:55)  Source: Legionella  Preliminary Report (06-17-25 @ 23:01):    No Legionella species isolated to date    Culture - Bronchial (collected 06-16-25 @ 11:53)  Source: Bronch Wash Bronchial Wash  Gram Stain (06-17-25 @ 00:37):    Rare polymorphonuclear leukocytes per oil power field    No Squamous epithelial cells per oil power field    No organisms seen per oil power field  Final Report (06-18-25 @ 08:08):    Commensal marilee consistent with body site        RADIOLOGY & ADDITIONAL TESTS:  Personally reviewed.     Consultant(s) Notes Reviewed:  [x] YES  [ ] NO Patient is a 84y old  Female who presents with a chief complaint of septic shock, PNA (22 Jun 2025 08:03)    INTERVAL HPI/OVERNIGHT EVENTS: Patient was placed on bipap overnight. Pt seen and examined at the bedside this AM, was on high rochelle. Denied any acute concerns. X-ray ordered this AM.     MEDICATIONS  (STANDING):  albuterol/ipratropium for Nebulization 3 milliLiter(s) Nebulizer every 6 hours  aMIOdarone    Tablet 100 milliGRAM(s) Oral daily  atorvastatin 10 milliGRAM(s) Oral at bedtime  chlorhexidine 2% Cloths 1 Application(s) Topical <User Schedule>  dextrose 50% Injectable 25 Gram(s) IV Push once  dextrose 50% Injectable 12.5 Gram(s) IV Push once  dextrose 50% Injectable 25 Gram(s) IV Push once  fluticasone propionate/ salmeterol 500-50 MICROgram(s) Diskus 1 Dose(s) Inhalation two times a day  gabapentin 400 milliGRAM(s) Oral every 12 hours  insulin lispro (ADMELOG) corrective regimen sliding scale   SubCutaneous three times a day before meals  insulin lispro (ADMELOG) corrective regimen sliding scale   SubCutaneous at bedtime  pantoprazole    Tablet 40 milliGRAM(s) Oral before breakfast  predniSONE   Tablet 5 milliGRAM(s) Oral daily    MEDICATIONS  (PRN):  acetaminophen     Tablet .. 650 milliGRAM(s) Oral every 6 hours PRN Mild Pain (1 - 3)  dextrose Oral Gel 15 Gram(s) Oral once PRN Blood Glucose LESS THAN 70 milliGRAM(s)/deciliter      Allergies    No Known Allergies    Intolerances        REVIEW OF SYSTEMS: Denies other than mentioned in HPI    Vital Signs Last 24 Hrs  T(C): 36.4 (22 Jun 2025 05:06), Max: 36.6 (21 Jun 2025 11:27)  T(F): 97.6 (22 Jun 2025 05:06), Max: 97.8 (21 Jun 2025 11:27)  HR: 78 (22 Jun 2025 08:15) (75 - 91)  BP: 131/78 (22 Jun 2025 05:06) (121/70 - 136/76)  BP(mean): --  RR: 18 (22 Jun 2025 05:06) (18 - 18)  SpO2: 95% (22 Jun 2025 08:15) (92% - 97%)    Parameters below as of 22 Jun 2025 08:15  Patient On (Oxygen Delivery Method): nasal cannula, high flow      PHYSICAL EXAM:  GENERAL: NAD, elderly female, receiving duoneb  HEENT:  anicteric, moist mucous membranes  CHEST/LUNG:  Rhonchi notable b/l  HEART:  RRR, S1, S2  ABDOMEN:  BS+, soft, nontender, nondistended  MUSCULOSKELETAL: trace LE edema bilaterally  NEUROLOGIC: answers questions and follows commands appropriately    LABS:                        8.3    7.28  )-----------( 253      ( 22 Jun 2025 07:40 )             26.4     CBC Full  -  ( 22 Jun 2025 07:40 )  WBC Count : 7.28 K/uL  Hemoglobin : 8.3 g/dL  Hematocrit : 26.4 %  Platelet Count - Automated : 253 K/uL  Mean Cell Volume : 98.1 fl  Mean Cell Hemoglobin : 30.9 pg  Mean Cell Hemoglobin Concentration : 31.4 g/dL  Auto Neutrophil # : 5.40 K/uL  Auto Lymphocyte # : 0.45 K/uL  Auto Monocyte # : 1.35 K/uL  Auto Eosinophil # : 0.00 K/uL  Auto Basophil # : 0.01 K/uL  Auto Neutrophil % : 74.2 %  Auto Lymphocyte % : 6.2 %  Auto Monocyte % : 18.5 %  Auto Eosinophil % : 0.0 %  Auto Basophil % : 0.1 %    22 Jun 2025 07:40    146    |  104    |  31     ----------------------------<  117    4.3     |  39     |  0.88     Ca    9.0        22 Jun 2025 07:40    TPro  5.9    /  Alb  2.9    /  TBili  0.7    /  DBili  x      /  AST  <3     /  ALT  21     /  AlkPhos  58     22 Jun 2025 07:40      Urinalysis Basic - ( 22 Jun 2025 07:40 )    Color: x / Appearance: x / SG: x / pH: x  Gluc: 117 mg/dL / Ketone: x  / Bili: x / Urobili: x   Blood: x / Protein: x / Nitrite: x   Leuk Esterase: x / RBC: x / WBC x   Sq Epi: x / Non Sq Epi: x / Bacteria: x      CAPILLARY BLOOD GLUCOSE      POCT Blood Glucose.: 137 mg/dL (22 Jun 2025 07:58)  POCT Blood Glucose.: 129 mg/dL (21 Jun 2025 21:37)  POCT Blood Glucose.: 118 mg/dL (21 Jun 2025 16:36)  POCT Blood Glucose.: 119 mg/dL (21 Jun 2025 11:58)        Culture - Bronchial (collected 06-16-25 @ 13:09)  Source: Bronch Wash Bronchial Wash  Gram Stain (06-16-25 @ 21:58):    Rare Squamous epithelial cells seen per low power field    No polymorphonuclear leukocytes seen per low power field    No organisms seen per oil power field  Final Report (06-18-25 @ 10:28):    Commensal marilee consistent with body site    Culture - Acid Fast - Bronchial w/Smear (collected 06-16-25 @ 11:55)  Source: Bronch Wash Bronchial Wash  Preliminary Report (06-18-25 @ 23:07):    Culture is being performed.    Culture - Fungal, Bronchial (collected 06-16-25 @ 11:55)  Source: Bronchial Bronchial Wash  Preliminary Report (06-19-25 @ 08:55):    No growth    Culture - Legionella (collected 06-16-25 @ 11:55)  Source: Legionella  Preliminary Report (06-17-25 @ 23:01):    No Legionella species isolated to date    Culture - Bronchial (collected 06-16-25 @ 11:53)  Source: Bronch Wash Bronchial Wash  Gram Stain (06-17-25 @ 00:37):    Rare polymorphonuclear leukocytes per oil power field    No Squamous epithelial cells per oil power field    No organisms seen per oil power field  Final Report (06-18-25 @ 08:08):    Commensal marilee consistent with body site        RADIOLOGY & ADDITIONAL TESTS:  Personally reviewed.     Consultant(s) Notes Reviewed:  [x] YES  [ ] NO

## 2025-06-22 NOTE — PROGRESS NOTE ADULT - ASSESSMENT
REASON FOR VISIT  .. Management of problems listed below      EVENTS/CURRENT ISSUES.  . 6/22/2025 worsening gas exchange placed on hfnc apixa stoppd   . 6/20/2025 apixa restartd and noc bpap orderde   . 6/16/2025 fob done oozing rul and l lo lobe post basal   . 6/14/2025 iv ufh dced   . 6/13/2025 continues to have mild hemoptysis but is also on iv ufh drip   . 6/13/2025 dw pt re rbaa of bronch and se agrees scheduled for 6/16 10 in or   . 6/13/2025 dw cardio and id   . 6/11/2025 qft funfitell and galactomannan orderd   . 6/11/2025 pt wa sstarted on iv ufh for hemoptysis and pleuritic chest pain but was stopped when vq showed vlp   . 6/10/2025 Mild hemoptysis   . 6/9 tr out of icu  . 6/8/2025  . PNEUMONIA RML 6/8/2025  . SHOCK 6/8/2025   . NOREPI 6/8/2025   . STRESS STEROIDS   .... HYDROCORTISONE 6/8/2025  . A fib (chronic) POA 6/8/2025  . ANEMIA Hb 6/8/2025 Hb 7.5  . KIMBERLY ON CKD Cr 6/8/2025 Cr 1.9        REVIEW OF SYMPTOMS   Able to give ROS  Yes     RELIABILITY +/-   CONSTITUTIONAL Weakness Yes    ENDOCRINE  No heat or cold intolerance    ALLERGY No hives  Sore throat No stridor  RESP Shortness of breath YES   NEURO New weakness No   CARDIAC   Palpitations No         PHYSICAL EXAM    HEENT Unremarkable  atraumatic   RESP Fair air entry  Harsh breath sound   CARDIAC S1 S2 No S3     NO JVD    ABDOMEN No hepatosplenomegaly   PEDAL EDEMA present No calf tenderness    REASON FOR VISIT  .. Management of problems listed below      CC.   . 6/8/2025 brought in by ems for right sided chest pain that started at 3am- nonradiating- patient was offered aspirin by ems- patient refused and stated to ems that she is on plavix and was advised not to take aspirin. patient on 43 litres nasl cannula-     OVERALL PRESENTATION.  . 6/8/2025 84 yr old female with PMHx afib on eliquis, COPD (not on home O2), PE/Rt lower extremity DVT 2017, Rt LE IVC filter 2017 CKD3, aplastic anemia, polymyalgia rheumatica on prednisone 5 mg po qd, requiring PRBC transfusions ~8 weeks (via Rt subclavian mediport), AVN bilat hips, HTN, HLD, HFpEF (75% 6.2.25), diastolic dysfx grade1, recent hospitalization 5/25/25-6/5/25 for ADHF/COPD exacerbation, Pt with eventual improvement and transfer to [facility] facility from where she presents to [facility]. this a.m. 6/8/25 with c/o Rt sided chest pain. general malaise. Pt stated began to feel ill evening before presentation, daughter this a.m. endorsed pt lethargic, pale.     In E.D. Pt found to have fever with tmax of 101, KIMBERLY on CKD with sCr 1.90 (baseline 1.2-1.6), HYPOtnsive with SBP 80's (pt on midodrine therapy, usual 's). In addition found to have leukocytosis of 37.6 (baseline 5.25 6/5/25), procalcitonin of 13.6, Hgb 7.5, elevated INR 2.27,  Chest xray demonstrated RML consolidations, concerning for pneum. In E.D. pt receiving 500 cc's NS, Vanco 1 gm, zosyn. Pt received tylenol 1 gm IV x1.     PMH.        BEST PRACTICE ISSUES.  . HOB ELEVATN.    .... Yes  . DIET  .   .... DASH 6/8/2025   . FREE WATER.   ....   .  IV FLUID.  .... 6/15-6/20/2025 1/2 ns 50   ..... 6/8 lr 40 x 12 h   . PHARMAC DVT PPLX .    .... 6/20 apixa 2.5 x 2   .... 6/12-6/14/2025 iv ufh (hospitalist)  .... 6/11/2025 [facility] 5k.2   .... 6/10/2025 4:46 PM iv ufh   .... [facility] 6/9-6/10/2025   . NON PHARMAC DVT PPLX .      . STRESS ULCR PPLX .   .... PROTONIX 40 6/22/2025   . DATE/DM MGMT.   ..... See under Endocrine section   GENERAL DATA .   . COVID.         .... scv2 6/8/2025 scv2 (-)   . GOC.    ....    . ICU STAY.    .... 6/8-6/9   . INFECTION PPLX .   .... CHLORHEXIDINE 2% 6/8/2025   . ALLGY.   ....  nka  . WT.   .... 6/8/2025 69   . BMI.  ....6/8/2025 26      [id number]  VITALS/GAS EXCHANGE/DRIPS    ABGS.   6/20/2025 4p 5l 736/71/65   .  VS/ PO/IO/ VENT/ DRIPS.  6/22/2025 afeb 70 110/60   6/22/2025 hfnc 450 l 80% 95%    [id number]  PROBLEM ASSESSMENT RECOMMENDATIONS.  RESP.   . GAS EXCHANGE .   .... target PO 90-95%     . NEED FOR HOME OXYGEN   .... 6/18/2025 will need home oxygen if at discharge pulse ox at rest or on exertion is below 88%     . COPD   .... ADVAIR 500 6/8/2025   .... MONTELUKAST 10 6/8/2025   .... SPIRIVA 6/8/2025   .... nacl 3% bid 6/22/2025     . RESP FAILURE  .... 6/8/2025 Has ho hypercapnia   .... 6/8/2025 recommend monitor abg  .... 6/18/2025 requiring 4l nc     . HYPERCAPNIC RESP FAILURE WITHOUT ACIDEMIA   6/20/2025 4p 5l 736/71/65   .... 6/20/2025 sterted noct bpap 35/15/6/16    . MILD HEMOPTYSIS 6/10/2025   .... ct ch 6/10/2025 cw 5/31  ........ new mf infection in rul   ........ unchanged small r greater than left pl effsn   ....... enlarged pulm artery suggesting pulm hytn   .... 6/10/2025  dw hemat cardio id and instead of restarting apixa will start iv UFH which can be easily reversed in case Hb starts dropping but will be the rx for venous thromboembolism as well as for a fib   .... v duplx 6/10 (-)  .... vq 6/10 vlp   .... 6/13/2025 continues to have mild hemoptysis but is also on iv ufh drip   . 6/13/2025 dw pt re rbaa of bronch and se agrees scheduled for 6/16 10 in or   .... 6/13/2025 dw cardio and id   .... 6/16 fob done showed ooze rul and l lo lobe no eb lesions   .... 6/17/2025 continues to have mild hemoptysis   .... 6/17/2025 30% of hemoptysis cases no cause is found  Ordered gbm ab rf dsdna rf apla chapman   .... 6/18/2025 cbmab dsdna inderjit apla ab all (-)   .... 6/12 fungitell galactomannan (-)   .... strep legn ag 6/9 (-)   .... fob 6/16 afb sm (-) cyto (-)  .... 6/18/2025 hemoptysis likely sec to pneumonia in setting of pulm hypertension   .... 6/20/2025 apixa restarted   .... 6/22/2025 apixa dced    . PLEURITIC CHEST PAIN RO VTE   .... v duplx 6/10 (-)  .... vq 6/10 vlp   .... 6/12/2025 iv ufh was stopped 6/11 as vq vlp but was restarted by hospitalist 6/12/2025 for af   .... chest pain resolvd       INFECTION.  . DATA  .... w 6/22/2025 w 7.2   .... w 6/8-6/10-6/13-6/14-6/15-6/17-6/18-6/19-6/20/2025   .... w 13.6 - 9.2 - 15.9 - 18- 14 - 9.6 - 11.9- 9.8 - 8.7   .... esr 6/8/2025 esr 17   .... w 6/8-6/9/2025 w 32 - 24   .... ua 6/8/2025 w 4   .... cxr 6/8/2025 cxr dense infiltra r upper hilum r mediport  .... ct  6/10 cw 5/31  ........ r greater than l effs   ........ partial rll atelectasis   ........ new patchy and nodular areas of consolidation rul and associated ground glass opacities   ........ ground glass and nodular opac also scott   ........ numerous tib rml and rll     . MICROBIO  .... sp 6/8 n commensals   .... flu ab 6/8/2025 (-)   .... rsv 6/8/2025 (-)    .... mrsa 6/9/2025 (-)  .... uc 6/8 (-)  .... bc 6/8 (-)     . PNEUMONIA RUL 6/8/2025   .... AZITHRO 6/8-6/11 /2025   .... ZOSYN 6/8-6/15/2025     . PNEUMONIA   .... ct  6/22/2025 cw 6/10/2025   ....... incr consolidation and ggo rul   ....... new large consolidatn scott and l lo lobe   .... 6/22/2025 zosyn     CARDIAC.  . PAIN CHEST   .... 6/10/2025 co pain chest r   .... 6/10/2025 check ct to see if she has pleurisy   .... 6/10/2025  dw hemat cardio id and instead of restarting apixa will start iv UFH which can be easily reversed in case Hb starts dropping but will be the rx for venous thromboembolism as well as for a fib     . STRESS STEROIDS   .... HYDROCORTISONE   ........ 6/8/2025 h 50.4  ........ 6/10/2025 h 50.2   ........ 6/12/2025 hydrocort 50   ....... 6/14/2025 h 25  ........ 6/17/2025 dced     . SHOCK   .... MIDODRINE 6/8-6/16/2025 m 10.3   .... DROXIDOPA 6/9-6/16/2025 d 100.3   .... NOREPI 6/8-6/9/2025 n 8/250   .... target map 65 (+)     . CAD    .... Trop 6/8-6/9/2025 Tr 32- 24     . LACTICEMIA   .... la 6/8/2025 la 1.4     . CHF  .... pbnp 6/8/2025 pbnp 6599   .... tte 4/2025 mild ph  n vf  .... tte 6/2/2025   ........ ef 71%   ........ n rvsf    ........ pasp 54   ....... la mod dilatd   .... lasix 40/d 6/22/2025  .... torsemide 20 6/17-6/22/2025  .... lasix 40 once 6/14/2025  .... 6/22/2025 lasix 20 once     . A fib (chronic) POA 6/8/2025  .... AMIODARONE 100 6/8/2025  .... 6/10/2025  dw hemat cardio id and instead of restarting apixa will start iv UFH which can be easily reversed in case Hb starts dropping but will be the rx for venous thromboembolism as well as for a fib   .... 6/12-6/14/2025 iv ufh     HEMAT.  . DATA  .... Plt 6/8/2025 plt 153   .... inr 6/8-6/9/2025 inr 2.27- 1.63      . ANEMIA   .... Hb 6/19-6/20-6/21-6/22/2025   .... Hb 7.8 - 9.2 - 9.3 - 8.3   .... Hb 6/8-6/9-6/10-6/11-6/13-6/14-6/15-6/17-6/18/2025   .... Hb 7.5- 7.8 - 7.6- 7.1 - 8.4 - 7.3- 9.1 - 8.1 - 8.5    . TRANSFUSION  .... 6/8  1 u prbc    .... 6/14 2 u prbc   .... 6/22/2025 1 u prbc     GI.   . DIET .   .... dash 6/8/2025     . LFT MONITORING   .... LFTS    6/8/2025  ........ AP     39  ........ AST   11  ........ ALT    35    RENAL.  . DATA  .... Na 6/8/2025 Na 137  .... K 6/8/2025 K 4   .... CO2 6/8/2025 co2 29   .... ag 6/8/2025 ag 9  .... alb 6/8/2025 alb 3.4     . KIMBERLY ON CKD   .... Cr 6/8-6/9-6/10-6/11-6/13-6/14/2025   .... Cr 1.9 - 1.4 - 1.3 - 1.3 - 1.1 - 1  .... Cr 5/27-5/28-5/29-5/30-6/1/2025   .... Cr 1.2 - 1.3 - 1.3 - 1.6 - 1.6    ENDO.  . DM  .  .... 6/8/2025 SL SCALE     NEURO.  . PAIN  .... GABAPENTIN 6/8/2025 g 400.2     [id number]   SUMMARY BASELINE .     84 yr old female pt of Dr Gallegos not on home ox or home cpap used to use trelegy lives with spouse quit 40 y 1/2 ppd with PMHx afib on eliquis, COPD (not on home O2), PE/Rt lower extremity DVT 2017, Rt LE IVC filter 2017 CKD3, aplastic anemia, polymyalgia rheumatica on prednisone 5 mg po qd, requiring PRBC transfusions around 8 weeks (via Rt subclavian mediport), AVN bilat hips, HTN, HLD, HFpEF (75% 6.2.25), diastolic dysfx grade1, recent hospitalization 5/25/25-6/5/25 for ADHF/COPD exacerbation, Pt with eventual improvement and transfer to [facility] facility   CC.   . 6/8/2025 R CHEST PAIN   MAIN ISSUES.  . COPD   . HYPERCAPNIC RESP FAILURE: 6/20/2025 4p 5l 736/71/65   .... 6/20/2025 Noct bpap 15/5/35/16 ordrd   . RESP FAILURE 6/22/2025 Started HFNC 6/22 5p HFNC 80% 50l po 95%   . MILD HEMOPTYSOIS 6/10/2025 6/22 apixa stoppd   . PNEUMONIA RML 6/8/2025:  ZOSYN 6/8-6/16/2025   . PNEUMONIA: 6/22 ct bl ul opac 6/22/2025 zosyn   . PLEURITIS CHEST PAIN 6/10/2025: 6/10 vte excluded vlp vq   . SHOCK 6/8/2025: tr oo icu 6/9   . NOREPI 6/8-6/9/2025   . STRESS STEROIDS : HYDROCORTISONE 6/8-6/17/2025  . A fib (chronic) POA 6/8/20256/22/2025 6/22 apixa stopped   . CHF: tte 6/2/2025  ef 71%  pasp 54  la mod dilatd   . ANEMIA Hb 6/8-6/13/2025 Hb 7.5- 8.4  . TRANSFUSION 6/8  1 u prbc  6/22 1 u prbc   . KIMBERLY ON CKD Cr 6/8-6/13/2025 Cr 1.9- 1.1  PMH.   . AFon Eliquis,   . COPD (not on home o2),   . CKD,   . aplastic anemia,   . TRANSFUSION 6/2/2025 1 u prbc   . PMR (chronic prednisone with AVN hip),   . HLD,   . HTN,   . DM    PROCEDURES/DEVICES .  . 6/16/2025 FOB br wash rul ooze rul l lo lobe post basal     PAST PROCEDURES   . r mediport poa 6/8/2025     DISCUSSIONS.  .... Discussed with primary care and relevant consultants on an ongoing basis       TIME SPENT.  . Over 36 minutes aggregate care time spent on encounter; activities included   direct patient care, counseling and/or coordinating care reviewing notes, lab data/ imaging , discussion with multidisciplinary team/ patient  /family and explaining in detail risks, benefits, alternatives  of the recommendations     ROBERT HENRIQUEZ 84 6/8/2025 1942

## 2025-06-22 NOTE — CONSULT NOTE ADULT - ASSESSMENT
84 female PMH of A-fib on Eliquis, COPD, CKD, aplastic anemia, polymyalgia rheumatica, on chronic steroids, avascular necrosis of hips, HLD, HTN, DM, recent admission to [place] 5/26 through 6/5/2025 for CHF/fluid overload/COPD exacerbation, presents to the ED form [facility] for evaluation of fever and generalized feeling of being unwell, found to be septic and hypotensive on 6/8 admission, admitted for ICU briefly for vasopressors support and placed on floor. MICU reconsulted on 6/22 for hypoxemia/hypercapnia during the day at that time pt appeared well on HFNC 30%fio2 therefore pt stayed on GMF. RRT tonight for AMS concern for hypercapnic respiratory failure    INTERVAL EVENTS:   RRT tonight for AMS/lethargy. Pt seen at bedside on HFNC 40L/60% fio2 spo2 90%. Pt is nearly obtunded opens eyes to stimulation. According to her daughter at bedside has been refusing BIPAP. Will order Precedex as needed for compliance.   Suspect CO2 narcosis. Pt has been refusing BIPAP, most recent abg from 2 days prior.  STAT ABG is 7.31/82/153/41. Started on BIPAP 16/8 40% ->will lower Ipap/epap to decrease intrathoracic pressure d/t pHTN. Goal SPO2 88-92 avoid hyperoxygenation. Repeat Abg in 2 hours  Acute on chronic hypercapnic respiratory failure with CHF exacerbation and superimposed pneumonia.   CT chest today with increased GGO with large left sided infiltrate. Abx restarted today with Zosyn, will add azithro for atypicals. D/t acute worsening today will start stress steroids solucortef 50 Q8. All Cx data NGTD. Afebrile though appears diaphoretic.  NPO while on BIPAP. Continue bronchodilators. Head of Bed>30 for aspiration prevention.  Would get repeat Cultures.  Pt appears to have mod-severe pHTN. component of volume overload on lasix 40mg IVP QD. Monitor UOP response.   Hgb has been downtrending 9.3->8.3->7.9 was ordered for 1u PRBC followed by additonal 20mg IVP lasix on GMF. No signs of yemi bleeding, off full AC now. Her baseline hgb is around 8?    Dispo:    CRITICAL CARE TIME SPENT: ***  minutes of critical care time spent providing medical care for patient's acute illness/conditions that impairs at least one vital organ system and/or poses a high risk of imminent or life threatening deterioration in the patient's condition. It includes time spent evaluating and treating the patient's acute illness as well as time spent reviewing labs, radiology, discussing goals of care with patient and/or patient's family, and discussing the case with a multidisciplinary team, in an effort to prevent further life threatening deterioration or end organ damage. This time is independent of any procedures performed.     84 female PMH of A-fib on Eliquis, COPD, CKD, aplastic anemia, polymyalgia rheumatica, on chronic steroids, avascular necrosis of hips, HLD, HTN, DM, recent admission to [place] 5/26 through 6/5/2025 for CHF/fluid overload/COPD exacerbation, presents to the ED form [facility] for evaluation of fever and generalized feeling of being unwell, found to be septic and hypotensive on 6/8 admission, admitted for ICU briefly for vasopressors support and placed on floor. MICU reconsulted on 6/22 for hypoxemia/hypercapnia during the day at that time pt appeared well on HFNC 30%fio2 therefore pt stayed on GMF. RRT tonight for AMS concern for hypercapnic respiratory failure    INTERVAL EVENTS:   RRT tonight for AMS/lethargy. Pt seen at bedside on HFNC 40L/60% fio2 spo2 90%. Pt is nearly obtunded opens eyes to stimulation. According to her daughter at bedside has been refusing BIPAP. Will order Precedex as needed for compliance.   Suspect CO2 narcosis. Pt has been refusing BIPAP, most recent abg from 2 days prior.  STAT ABG is 7.31/82/153/41. Started on BIPAP 16/8 40% ->will lower Ipap/epap to decrease intrathoracic pressure d/t pHTN. Goal SPO2 88-92 avoid hyperoxygenation. Repeat Abg in 2 hours  Acute on chronic hypercapnic respiratory failure with CHF exacerbation and superimposed pneumonia.   CT chest today with increased GGO with very large dense left sided infiltrate but multifocal pneumonia (compared to previous 6/10 significantly worse). Abx restarted today with Zosyn, will add azithro for atypical. D/t acute worsening today will start stress steroids solucortef 50 Q8. (pt is on prednisone 5mg chronically) All Cx data NGTD. Afebrile though appears diaphoretic.  NPO while on BIPAP. Continue bronchodilators. Head of Bed>30 for aspiration prevention.  Would get repeat Cultures.  Pt appears to have mod-severe pHTN. component of volume overload on lasix 40mg IVP QD. Monitor UOP response.   Hgb has been downtrending 9.3->8.3->7.9 was ordered for 1u PRBC followed by additonal 20mg IVP lasix on GMF. No signs of yemi bleeding, off full AC now. Her baseline hgb is around 8?    Dispo: Admit to ICU, critically ill at high risk of respiratory decompensation requiring intubation.    CRITICAL CARE TIME SPENT: 45  minutes of critical care time spent providing medical care for patient's acute illness/conditions that impairs at least one vital organ system and/or poses a high risk of imminent or life threatening deterioration in the patient's condition. It includes time spent evaluating and treating the patient's acute illness as well as time spent reviewing labs, radiology, discussing goals of care with patient and/or patient's family, and discussing the case with a multidisciplinary team, in an effort to prevent further life threatening deterioration or end organ damage. This time is independent of any procedures performed.     84 female PMH of A-fib on Eliquis, COPD, CKD, aplastic anemia, polymyalgia rheumatica, on chronic steroids, avascular necrosis of hips, HLD, HTN, DM, recent admission to [place] 5/26 through 6/5/2025 for CHF/fluid overload/COPD exacerbation, presents to the ED form [facility] for evaluation of fever and generalized feeling of being unwell, found to be septic and hypotensive on 6/8 admission, admitted for ICU briefly for vasopressors support and placed on floor. MICU reconsulted on 6/22 for hypoxemia/hypercapnia during the day at that time pt appeared well on HFNC 30%fio2 therefore pt stayed on GMF. RRT tonight for AMS concern for hypercapnic respiratory failure    INTERVAL EVENTS:   RRT tonight for AMS/lethargy. Pt seen at bedside on HFNC 40L/60% fio2 spo2 90%. Pt is nearly obtunded opens eyes to stimulation. According to her daughter at bedside has been refusing BIPAP. Will order Precedex as needed for compliance.   Suspect CO2 narcosis. Pt has been refusing BIPAP, most recent abg from 2 days prior.  STAT ABG is 7.31/82/153/41. Started on BIPAP 16/8 40% ->will lower Ipap/epap to decrease intrathoracic pressure d/t pHTN. Goal SPO2 88-92 avoid hyperoxygenation. Repeat Abg in 2 hours  Acute on chronic hypercapnic respiratory failure with CHF exacerbation and superimposed pneumonia.   CT chest today with increased GGO with very large dense left sided infiltrate but multifocal pneumonia (compared to previous 6/10 significantly worse). Abx restarted today with Zosyn, will add azithro for atypical. D/t acute worsening today will start stress steroids solucortef 50 Q8. (pt is on prednisone 5mg chronically) All Cx data NGTD. Afebrile though appears diaphoretic.  NPO while on BIPAP. Continue bronchodilators. Head of Bed>30 for aspiration prevention.  Would get repeat Cultures.  Pt appears to have mod-severe pHTN. component of volume overload on lasix 40mg IVP QD. Monitor UOP response.   Pt has aplastic anemia, receiving PRBC transfusions ~8 weeks (via Rt subclavian mediport) Her baseline appears to be around 8? Hgb has been downtrending 9.3->8.3->7.9 (she has received 1u PRBC 6/19) follow up repeat H/H  No signs of yemi bleeding, off full AC now.     Dispo: Admit to ICU, critically ill at high risk of respiratory decompensation requiring intubation.    CRITICAL CARE TIME SPENT: 45  minutes of critical care time spent providing medical care for patient's acute illness/conditions that impairs at least one vital organ system and/or poses a high risk of imminent or life threatening deterioration in the patient's condition. It includes time spent evaluating and treating the patient's acute illness as well as time spent reviewing labs, radiology, discussing goals of care with patient and/or patient's family, and discussing the case with a multidisciplinary team, in an effort to prevent further life threatening deterioration or end organ damage. This time is independent of any procedures performed.     84 female PMH of A-fib on Eliquis, COPD, CKD, aplastic anemia, polymyalgia rheumatica, on chronic steroids, avascular necrosis of hips, HLD, HTN, DM, recent admission to [place] 5/26 through 6/5/2025 for CHF/fluid overload/COPD exacerbation, presents to the ED form [facility] for evaluation of fever and generalized feeling of being unwell, found to be septic and hypotensive on 6/8 admission, admitted for ICU briefly for vasopressors support and placed on floor. MICU reconsulted on 6/22 for hypoxemia/hypercapnia during the day at that time pt appeared well on HFNC 30%fio2 therefore pt stayed on GMF. RRT tonight for AMS concern for hypercapnic respiratory failure    INTERVAL EVENTS:   RRT tonight for AMS/lethargy. Pt seen at bedside on HFNC 40L/60% fio2 spo2 90%. Pt is nearly obtunded opens eyes to stimulation. According to her daughter at bedside has been refusing BIPAP. Will order Precedex as needed for compliance.   Suspect CO2 narcosis. Pt has been refusing BIPAP, most recent abg from 2 days prior.  STAT ABG is 7.31/82/153/41. Started on BIPAP 16/8 40% ->will lower Ipap/epap to decrease intrathoracic pressure d/t pHTN. Goal SPO2 88-92 avoid hyperoxygenation. Repeat Abg in 2 hours  Acute on chronic hypercapnic respiratory failure with CHF exacerbation and superimposed pneumonia.   CT chest today with increased GGO with very large dense left sided infiltrate but multifocal pneumonia (compared to previous 6/10 significantly worse). Abx restarted today with Zosyn, will add azithro for atypical. D/t acute worsening today will start stress steroids solucortef 50 Q8. (pt is on prednisone 5mg chronically) All Cx data NGTD. Afebrile though appears diaphoretic.  NPO while on BIPAP. Continue bronchodilators. Head of Bed>30 for aspiration prevention.  Would get repeat Cultures.  Pt appears to have mod-severe pHTN. component of volume overload on lasix 40mg IVP QD. Monitor UOP response.   Pt has aplastic anemia, receiving PRBC transfusions ~8 weeks (via Rt subclavian mediport) Her baseline appears to be around 8? Hgb has been downtrending 9.3->8.3->7.9 (she has received 1u PRBC 6/19) follow up repeat H/H if not dropping will hold off on further transfusions.  No signs of yemi bleeding, off full AC now.     Addendum: 2315: Serial abgs initially with worsening acidosis concerning for further respiratory decompensation. Not pulling good volumes, switched to AVAPS. Repeat Abg on these settings showing improvement. Low dose precedex infusion for BIPAP compliance.    Dispo: Admit to ICU, critically ill at high risk of respiratory decompensation requiring intubation.    CRITICAL CARE TIME SPENT: 65  minutes of critical care time spent providing medical care for patient's acute illness/conditions that impairs at least one vital organ system and/or poses a high risk of imminent or life threatening deterioration in the patient's condition. It includes time spent evaluating and treating the patient's acute illness as well as time spent reviewing labs, radiology, discussing goals of care with patient and/or patient's family, and discussing the case with a multidisciplinary team, in an effort to prevent further life threatening deterioration or end organ damage. This time is independent of any procedures performed.

## 2025-06-22 NOTE — PROGRESS NOTE ADULT - PROBLEM SELECTOR PLAN 12
#Edema:  trace edema  -restarted home Torsemide 20 mg daily- will hold am dose as mild hypernatremia and prerenal azotemia -fu bmp in am     #VTE ppx: - eliquis   #GI ppx: iv ppi  #Diet: Diet, Regular: DASH/TLC {Sodium & Cholesterol Restricted} (06-08-25 @ 15:56) [Active]  #Activity: Activity - Increase As Tolerated:   Time/Priority:  Routine (06-08-25 @ 14:16) [Active]  #ACP: full code  #Dispo: further plans pending clinical course #Edema:  trace edema  -restarted home Torsemide 20 mg daily- will hold am dose as mild hypernatremia and prerenal azotemia -fu bmp in am   -started lasix 40mg IVP for now, monitor bmp for now    #VTE ppx: - eliquis   #GI ppx: iv ppi  #Diet: Diet, Regular: DASH/TLC {Sodium & Cholesterol Restricted} (06-08-25 @ 15:56) [Active]  #Activity: Activity - Increase As Tolerated:   Time/Priority:  Routine (06-08-25 @ 14:16) [Active]  #ACP: full code  #Dispo: further plans pending clinical course

## 2025-06-23 DIAGNOSIS — J96.01 ACUTE RESPIRATORY FAILURE WITH HYPOXIA: ICD-10-CM

## 2025-06-23 LAB
ALBUMIN SERPL ELPH-MCNC: 2.7 G/DL — LOW (ref 3.3–5)
ALP SERPL-CCNC: 52 U/L — SIGNIFICANT CHANGE UP (ref 40–120)
ALT FLD-CCNC: 14 U/L — SIGNIFICANT CHANGE UP (ref 12–78)
ANION GAP SERPL CALC-SCNC: 1 MMOL/L — LOW (ref 5–17)
AST SERPL-CCNC: 3 U/L — LOW (ref 15–37)
BASE EXCESS BLDA CALC-SCNC: 13.5 MMOL/L — HIGH (ref -2–3)
BILIRUB SERPL-MCNC: 0.6 MG/DL — SIGNIFICANT CHANGE UP (ref 0.2–1.2)
BLOOD GAS COMMENTS ARTERIAL: SIGNIFICANT CHANGE UP
BUN SERPL-MCNC: 36 MG/DL — HIGH (ref 7–23)
CALCIUM SERPL-MCNC: 9.1 MG/DL — SIGNIFICANT CHANGE UP (ref 8.5–10.1)
CHLORIDE SERPL-SCNC: 104 MMOL/L — SIGNIFICANT CHANGE UP (ref 96–108)
CO2 SERPL-SCNC: 40 MMOL/L — HIGH (ref 22–31)
CREAT SERPL-MCNC: 1 MG/DL — SIGNIFICANT CHANGE UP (ref 0.5–1.3)
EGFR: 56 ML/MIN/1.73M2 — LOW
EGFR: 56 ML/MIN/1.73M2 — LOW
GAS PNL BLDA: SIGNIFICANT CHANGE UP
GLUCOSE BLDC GLUCOMTR-MCNC: 129 MG/DL — HIGH (ref 70–99)
GLUCOSE BLDC GLUCOMTR-MCNC: 136 MG/DL — HIGH (ref 70–99)
GLUCOSE BLDC GLUCOMTR-MCNC: 142 MG/DL — HIGH (ref 70–99)
GLUCOSE BLDC GLUCOMTR-MCNC: 155 MG/DL — HIGH (ref 70–99)
GLUCOSE SERPL-MCNC: 86 MG/DL — SIGNIFICANT CHANGE UP (ref 70–99)
HCO3 BLDA-SCNC: 42 MMOL/L — HIGH (ref 21–28)
HCT VFR BLD CALC: 24.4 % — LOW (ref 34.5–45)
HCT VFR BLD CALC: 28.7 % — LOW (ref 34.5–45)
HGB BLD-MCNC: 7.2 G/DL — LOW (ref 11.5–15.5)
HGB BLD-MCNC: 9.2 G/DL — LOW (ref 11.5–15.5)
HOROWITZ INDEX BLDA+IHG-RTO: 45 — SIGNIFICANT CHANGE UP
MAGNESIUM SERPL-MCNC: 2 MG/DL — SIGNIFICANT CHANGE UP (ref 1.6–2.6)
MCHC RBC-ENTMCNC: 29.5 G/DL — LOW (ref 32–36)
MCHC RBC-ENTMCNC: 29.8 PG — SIGNIFICANT CHANGE UP (ref 27–34)
MCHC RBC-ENTMCNC: 30.4 PG — SIGNIFICANT CHANGE UP (ref 27–34)
MCHC RBC-ENTMCNC: 32.1 G/DL — SIGNIFICANT CHANGE UP (ref 32–36)
MCV RBC AUTO: 100.8 FL — HIGH (ref 80–100)
MCV RBC AUTO: 94.7 FL — SIGNIFICANT CHANGE UP (ref 80–100)
MRSA PCR RESULT.: SIGNIFICANT CHANGE UP
NRBC # BLD AUTO: 0 K/UL — SIGNIFICANT CHANGE UP (ref 0–0)
NRBC # BLD AUTO: 0 K/UL — SIGNIFICANT CHANGE UP (ref 0–0)
NRBC # FLD: 0 K/UL — SIGNIFICANT CHANGE UP (ref 0–0)
NRBC # FLD: 0 K/UL — SIGNIFICANT CHANGE UP (ref 0–0)
NRBC BLD AUTO-RTO: 0 /100 WBCS — SIGNIFICANT CHANGE UP (ref 0–0)
NRBC BLD AUTO-RTO: 0 /100 WBCS — SIGNIFICANT CHANGE UP (ref 0–0)
PCO2 BLDA: 67 MMHG — HIGH (ref 32–35)
PH BLDA: 7.4 — SIGNIFICANT CHANGE UP (ref 7.35–7.45)
PHOSPHATE SERPL-MCNC: 3.3 MG/DL — SIGNIFICANT CHANGE UP (ref 2.5–4.5)
PLATELET # BLD AUTO: 190 K/UL — SIGNIFICANT CHANGE UP (ref 150–400)
PLATELET # BLD AUTO: 209 K/UL — SIGNIFICANT CHANGE UP (ref 150–400)
PMV BLD: 11.3 FL — SIGNIFICANT CHANGE UP (ref 7–13)
PMV BLD: 11.3 FL — SIGNIFICANT CHANGE UP (ref 7–13)
PO2 BLDA: 85 MMHG — SIGNIFICANT CHANGE UP (ref 83–108)
POTASSIUM SERPL-MCNC: 4.2 MMOL/L — SIGNIFICANT CHANGE UP (ref 3.5–5.3)
POTASSIUM SERPL-SCNC: 4.2 MMOL/L — SIGNIFICANT CHANGE UP (ref 3.5–5.3)
PROT SERPL-MCNC: 5.4 G/DL — LOW (ref 6–8.3)
RBC # BLD: 2.42 M/UL — LOW (ref 3.8–5.2)
RBC # BLD: 3.03 M/UL — LOW (ref 3.8–5.2)
RBC # FLD: 18.5 % — HIGH (ref 10.3–14.5)
RBC # FLD: 19.3 % — HIGH (ref 10.3–14.5)
S AUREUS DNA NOSE QL NAA+PROBE: SIGNIFICANT CHANGE UP
SODIUM SERPL-SCNC: 145 MMOL/L — SIGNIFICANT CHANGE UP (ref 135–145)
WBC # BLD: 7.37 K/UL — SIGNIFICANT CHANGE UP (ref 3.8–10.5)
WBC # BLD: 7.82 K/UL — SIGNIFICANT CHANGE UP (ref 3.8–10.5)
WBC # FLD AUTO: 7.37 K/UL — SIGNIFICANT CHANGE UP (ref 3.8–10.5)
WBC # FLD AUTO: 7.82 K/UL — SIGNIFICANT CHANGE UP (ref 3.8–10.5)

## 2025-06-23 PROCEDURE — 99232 SBSQ HOSP IP/OBS MODERATE 35: CPT

## 2025-06-23 PROCEDURE — 99291 CRITICAL CARE FIRST HOUR: CPT | Mod: 2W

## 2025-06-23 PROCEDURE — 99291 CRITICAL CARE FIRST HOUR: CPT

## 2025-06-23 PROCEDURE — G0545: CPT

## 2025-06-23 PROCEDURE — 76604 US EXAM CHEST: CPT | Mod: 26

## 2025-06-23 RX ORDER — FUROSEMIDE 10 MG/ML
40 INJECTION INTRAMUSCULAR; INTRAVENOUS ONCE
Refills: 0 | Status: COMPLETED | OUTPATIENT
Start: 2025-06-23 | End: 2025-06-23

## 2025-06-23 RX ADMIN — Medication 50 MILLIGRAM(S): at 13:08

## 2025-06-23 RX ADMIN — IPRATROPIUM BROMIDE AND ALBUTEROL SULFATE 3 MILLILITER(S): .5; 2.5 SOLUTION RESPIRATORY (INHALATION) at 02:30

## 2025-06-23 RX ADMIN — Medication 25 GRAM(S): at 13:08

## 2025-06-23 RX ADMIN — Medication 50 MILLIGRAM(S): at 21:41

## 2025-06-23 RX ADMIN — Medication 4 MILLILITER(S): at 07:40

## 2025-06-23 RX ADMIN — Medication 40 MILLIGRAM(S): at 11:09

## 2025-06-23 RX ADMIN — Medication 4 MILLILITER(S): at 19:38

## 2025-06-23 RX ADMIN — Medication 1 APPLICATION(S): at 05:19

## 2025-06-23 RX ADMIN — Medication 4 MILLILITER(S): at 13:33

## 2025-06-23 RX ADMIN — Medication 25 GRAM(S): at 05:18

## 2025-06-23 RX ADMIN — GABAPENTIN 400 MILLIGRAM(S): 400 CAPSULE ORAL at 17:18

## 2025-06-23 RX ADMIN — IPRATROPIUM BROMIDE AND ALBUTEROL SULFATE 3 MILLILITER(S): .5; 2.5 SOLUTION RESPIRATORY (INHALATION) at 19:38

## 2025-06-23 RX ADMIN — IPRATROPIUM BROMIDE AND ALBUTEROL SULFATE 3 MILLILITER(S): .5; 2.5 SOLUTION RESPIRATORY (INHALATION) at 07:41

## 2025-06-23 RX ADMIN — Medication 4 MILLILITER(S): at 02:31

## 2025-06-23 RX ADMIN — IPRATROPIUM BROMIDE AND ALBUTEROL SULFATE 3 MILLILITER(S): .5; 2.5 SOLUTION RESPIRATORY (INHALATION) at 13:33

## 2025-06-23 RX ADMIN — INSULIN LISPRO 1: 100 INJECTION, SOLUTION INTRAVENOUS; SUBCUTANEOUS at 01:13

## 2025-06-23 RX ADMIN — FUROSEMIDE 40 MILLIGRAM(S): 10 INJECTION INTRAMUSCULAR; INTRAVENOUS at 09:30

## 2025-06-23 RX ADMIN — Medication 50 MILLIGRAM(S): at 05:18

## 2025-06-23 RX ADMIN — Medication 25 GRAM(S): at 21:42

## 2025-06-23 NOTE — PROGRESS NOTE ADULT - ASSESSMENT
84F h/o AFib on Eliquis, COPD, CKD, aplastic anemia, polymyalgia rheumatica on chronic steroids, avascular necrosis of hips, HTN, HLD, with recent admission to [facility] 5/26 - 6/5 for acute HFpEF exacerbation and COPD exacerbation, discharged to rehab on 6/5 and returning today with right sided chest pain, general malaise and feeling unwell. She reports some slight cough though otherwise no other new symptoms reported. Found to be hypotensive, febrile w/ leukocytosis. Admitted to ICU for septic shock likely 2/2 to PNA and KIMBERLY. Now stable for downgrade to tele.

## 2025-06-23 NOTE — PROGRESS NOTE ADULT - PROBLEM SELECTOR PLAN 2
In the ED, tmax of 101 w/ KIMBERLY on CKD with Cr 1.90 (baseline 1.2-1.6), Hypotensive with SBP 80's (pt on midodrine therapy, usual 's). also w/ leukocytosis of 32.33. Procalcitonin of 13.6, RVP neg. Pt treated in ICU for septic shock requiring pressors, downgraded from the ICU to telemetry on 6/10  - CT chest 6/10 with multifocal R sided PNA  - completed zosyn on 06/18; restarted abx as per ICU recommendations on 06/22-- received 1x vanc and restarted zosyn   - also on antihero to cover atypicals  - completed weaning steroids on 06/18, resumed high dose steroids 06/22   - quant (negative) and fungitell 52 (negative)  - MRSA/MSSA PCR negative  - trend WBC and fever curve, supplemental O2 prn to maintain SPO2 > 92% -- taper o2  - ID consulted Dr Velazco recs appreciated

## 2025-06-23 NOTE — PROGRESS NOTE ADULT - SUBJECTIVE AND OBJECTIVE BOX
[facility]  INFECTIOUS DISEASES - [address], [place] [address]  Tel: [phone]     Fax: [phone]  =======================================================    MARTINEZ JONES [id number]    Follow up: No fevers. Transferred to ICU yesterday. On BIPAP. Denies any pain or SOB.    Allergies:  No Known Allergies      Antibiotics:  acetaminophen     Tablet .. 650 milliGRAM(s) Oral every 6 hours PRN  albuterol/ipratropium for Nebulization 3 milliLiter(s) Nebulizer every 6 hours  aMIOdarone    Tablet 100 milliGRAM(s) Oral daily  chlorhexidine 2% Cloths 1 Application(s) Topical <User Schedule>  dextrose 5%. 1000 milliLiter(s) IV Continuous <Continuous>  dextrose 5%. 1000 milliLiter(s) IV Continuous <Continuous>  dextrose 50% Injectable 25 Gram(s) IV Push once  dextrose 50% Injectable 12.5 Gram(s) IV Push once  dextrose 50% Injectable 25 Gram(s) IV Push once  dextrose Oral Gel 15 Gram(s) Oral once PRN  fluticasone propionate/ salmeterol 500-50 MICROgram(s) Diskus 1 Dose(s) Inhalation two times a day  furosemide   Injectable 40 milliGRAM(s) IV Push daily  gabapentin 400 milliGRAM(s) Oral every 12 hours  glucagon  Injectable 1 milliGRAM(s) IntraMuscular once  hydrocortisone sodium succinate Injectable 50 milliGRAM(s) IV Push every 8 hours  insulin lispro (ADMELOG) corrective regimen sliding scale   SubCutaneous every 6 hours  pantoprazole  Injectable 40 milliGRAM(s) IV Push daily  piperacillin/tazobactam IVPB.. 3.375 Gram(s) IV Intermittent every 8 hours  sodium chloride 3%  Inhalation 4 milliLiter(s) Inhalation every 6 hours       REVIEW OF SYSTEMS:  limited, as per HPI     Physical Exam:  ICU Vital Signs Last 24 Hrs  T(C): 37.6 (23 Jun 2025 15:47), Max: 37.6 (23 Jun 2025 11:00)  T(F): 99.6 (23 Jun 2025 15:47), Max: 99.7 (23 Jun 2025 11:00)  HR: 86 (23 Jun 2025 15:00) (71 - 110)  BP: 131/60 (23 Jun 2025 15:00) (92/49 - 148/68)  BP(mean): 79 (23 Jun 2025 15:00) (61 - 95)  ABP: --  ABP(mean): --  RR: 18 (23 Jun 2025 15:00) (14 - 26)  SpO2: 95% (23 Jun 2025 15:00) (88% - 100%)    O2 Parameters below as of 23 Jun 2025 15:00  Patient On (Oxygen Delivery Method): BiPAP/CPAP           GEN: Appears weak but awake, not in apparent distress  HEENT: normocephalic and atraumatic.   NECK: Supple.   CHEST: + chest port  LUNGS: Normal respiratory effort  HEART: Regular rate and rhythm   ABDOMEN: Soft, nontender, and nondistended.    EXTREMITIES: B/L Lower leg pitting edema.  NEUROLOGIC: Answering some simple questions  PSYCHIATRIC: Appropriate affect .    Labs:  06-23    145  |  104  |  36[H]  ----------------------------<  86  4.2   |  40[H]  |  1.00    Ca    9.1      23 Jun 2025 05:20  Phos  3.3     06-23  Mg     2.0     06-23    TPro  5.4[L]  /  Alb  2.7[L]  /  TBili  0.6  /  DBili  x   /  AST  3[L]  /  ALT  14  /  AlkPhos  52  06-23                          7.2    7.37  )-----------( 190      ( 23 Jun 2025 05:20 )             24.4       Urinalysis Basic - ( 23 Jun 2025 05:20 )    Color: x / Appearance: x / SG: x / pH: x  Gluc: 86 mg/dL / Ketone: x  / Bili: x / Urobili: x   Blood: x / Protein: x / Nitrite: x   Leuk Esterase: x / RBC: x / WBC x   Sq Epi: x / Non Sq Epi: x / Bacteria: x      LIVER FUNCTIONS - ( 23 Jun 2025 05:20 )  Alb: 2.7 g/dL / Pro: 5.4 g/dL / ALK PHOS: 52 U/L / ALT: 14 U/L / AST: 3 U/L / GGT: x             RECENT CULTURES:  06-16 @ 13:09 Bronch Wash Bronchial Wash     Commensal marilee consistent with body site    Rare Squamous epithelial cells seen per low power field  No polymorphonuclear leukocytes seen per low power field  No organisms seen per oil power field      06-16 @ 11:55 Bronch Wash Bronchial Wash     Culture is being performed.        06-16 @ 11:53 Bronch Wash Bronchial Wash     Commensal marilee consistent with body site    Rare polymorphonuclear leukocytes per oil power field  No Squamous epithelial cells per oil power field  No organisms seen per oil power field            All imaging and data are reviewed.       < from: CT Chest No Cont (06.22.25 @ 16:10) >    FINDINGS:    AIRWAYS, LUNGS, PLEURA: Patent central airways. Increased consolidation   and ground-glass opacity within right upper lobe. New large   consolidations within left upper lobe and left lower lobe. Small pleural   effusions are increased.    LYMPH NODES, MEDIASTINUM: Mildly enlarged mediastinal lymph nodes.    HEART, VESSELS: Heart size is normal. No pericardial effusion. Thoracic   aorta normal in diameter. Coronary calcification. Aortic calcification.   Dilated pulmonary artery. Mediport catheter within SVC.    UPPER ABDOMEN: There is a 1.1 cm elongated metallic material within   stomach unchanged from prior exam.    CHEST WALL, BONES: Spinal spondylosis. Shoulder osteoarthritis.   Compression fracture of T8 vertebral body is unchanged. L2 vertebroplasty.    LOWER NECK: Within normal limits.    IMPRESSION:    In comparison with 6/10/2025, Increased consolidation and ground-glass   opacity within right upper lobe. New large consolidations within left   upper lobe and left lower lobe. Small pleural effusions are increased.    --- End of Report ---        < end of copied text >

## 2025-06-23 NOTE — PROGRESS NOTE ADULT - SUBJECTIVE AND OBJECTIVE BOX
[INTERVAL HX: ]  Patient seen and examined;  Chart reviewed and events noted;   no CP, +SOB  s/p 1U PRBC    slight wheeze on exam, minimal LE edema      [MEDICATIONS]  MEDICATIONS  (STANDING):  albuterol/ipratropium for Nebulization 3 milliLiter(s) Nebulizer every 6 hours  aMIOdarone    Tablet 100 milliGRAM(s) Oral daily  chlorhexidine 2% Cloths 1 Application(s) Topical <User Schedule>  dexMEDEtomidine Infusion 0.2 MICROgram(s)/kG/Hr (3.6 mL/Hr) IV Continuous <Continuous>  dextrose 5%. 1000 milliLiter(s) (50 mL/Hr) IV Continuous <Continuous>  dextrose 5%. 1000 milliLiter(s) (100 mL/Hr) IV Continuous <Continuous>  dextrose 50% Injectable 25 Gram(s) IV Push once  dextrose 50% Injectable 12.5 Gram(s) IV Push once  dextrose 50% Injectable 25 Gram(s) IV Push once  fluticasone propionate/ salmeterol 500-50 MICROgram(s) Diskus 1 Dose(s) Inhalation two times a day  furosemide   Injectable 40 milliGRAM(s) IV Push daily  gabapentin 400 milliGRAM(s) Oral every 12 hours  glucagon  Injectable 1 milliGRAM(s) IntraMuscular once  hydrocortisone sodium succinate Injectable 50 milliGRAM(s) IV Push every 8 hours  insulin lispro (ADMELOG) corrective regimen sliding scale   SubCutaneous every 6 hours  pantoprazole  Injectable 40 milliGRAM(s) IV Push daily  piperacillin/tazobactam IVPB.. 3.375 Gram(s) IV Intermittent every 8 hours  sodium chloride 3%  Inhalation 4 milliLiter(s) Inhalation every 6 hours    MEDICATIONS  (PRN):  acetaminophen     Tablet .. 650 milliGRAM(s) Oral every 6 hours PRN Mild Pain (1 - 3)  dextrose Oral Gel 15 Gram(s) Oral once PRN Blood Glucose LESS THAN 70 milliGRAM(s)/deciliter      [VITALS]  Vital Signs Last 24 Hrs  T(C): 37.6 (2025 14:00), Max: 37.6 (2025 11:00)  T(F): 99.7 (2025 14:00), Max: 99.7 (2025 11:00)  HR: 110 (2025 14:00) (71 - 110)  BP: 148/68 (2025 14:00) (92/49 - 148/68)  BP(mean): 91 (2025 14:00) (61 - 95)  RR: 26 (2025 14:00) (14 - 26)  SpO2: 94% (2025 14:00) (88% - 100%)    Parameters below as of 2025 13:37  Patient On (Oxygen Delivery Method): nasal cannula      [WT/HT]  Daily     Daily Weight in k.2 (2025 05:00)  [VENT]      [PHYSICAL EXAM]  GEN: NAD, pale, chronically ill looking  HEENT: normocephalic and atraumatic. EOMI. PERRL.    NECK: Supple.  No lymphadenopathy   LUNGS: faint wheeze  HEART: Regular rate and rhythm,  no MRG  ABDOMEN: Soft, nontender, and nondistended.  Positive bowel sounds.    : No CVA tenderness  EXTREMITIES: trace edema.  NEUROLOGIC: grossly intact.  PSYCHIATRIC: Appropriate affect .  SKIN: No rash     [LABS:]                        7.2    7.37  )-----------( 190      ( 2025 05:20 )             24.4     06-    145  |  104  |  36[H]  ----------------------------<  86  4.2   |  40[H]  |  1.00    Ca    9.1      2025 05:20  Phos  3.3       Mg     2.0         TPro  5.4[L]  /  Alb  2.7[L]  /  TBili  0.6  /  DBili  x   /  AST  3[L]  /  ALT  14  /  AlkPhos  52  23          Sedimentation Rate, Erythrocyte: 17 mm/hr [0 - 20] (25 @ 11:20)    Folate, Serum: >20.0 ng/mL (25 @ 09:20)    Vitamin B12, Serum: 1444 pg/mL *H* [232 - 1245] (25 @ 09:20)    Iron - Total Binding Capacity.: Unable to calculate Test Repeated ug/dL [220 - 430] (23 @ 14:00)    Ferritin: 1342 ng/mL *H* [13 - 330] (23 @ 14:00)    Reticulocyte Count (23 @ 14:00)  Reticulocyte Percent: 2.0 % [0.5 - 2.5]  Absolute Reticulocytes: 39.0 K/uL [25.0 - 125.0]    Vitamin B12, Serum: 663 pg/mL [232 - 1245] (23 @ 14:00)    Folate, Serum: 16.2 ng/mL (23 @ 14:00)    Serum Protein Electrophoresis Interp: Normal Electrophoresis Pattern (23 @ 14:00)    Immunofixation, Serum:   No Monoclonal Band Identified      Reference Range: None Detected (23 @ 14:00)      Urinalysis Basic - ( 2025 05:20 )    Color: x / Appearance: x / SG: x / pH: x  Gluc: 86 mg/dL / Ketone: x  / Bili: x / Urobili: x   Blood: x / Protein: x / Nitrite: x   Leuk Esterase: x / RBC: x / WBC x   Sq Epi: x / Non Sq Epi: x / Bacteria: x        SARS-CoV-2: NotDetec (26 May 2025 18:00)    ABG - ( 2025 02:08 )  pH, Arterial: 7.40  pH, Blood: x     /  pCO2: 67    /  pO2: 85    / HCO3: 42    / Base Excess: 13.5  /  SaO2: x                     [RADIOLOGY STUDIES:]

## 2025-06-23 NOTE — PROGRESS NOTE ADULT - SUBJECTIVE AND OBJECTIVE BOX
[facility]                                                       Dr. Chisholm, Dr. Franklin, Dr. Baron, Dr. Clark, Dr. Stock                                      [facility], [facility], [address]                                                 [address] [place] [address]                                      Ph: [phone]  Fax: [phone]                                         Ph: [phone]  Fax: [phone]      Patient is a 84y old  Female who presents with a chief complaint of septic shock, PNA (09 Jun 2025 12:36)  Patient seen in follow up for KIMBERLY on CKD.        PAST MEDICAL HISTORY:  COPD (chronic obstructive pulmonary disease)    DM (diabetes mellitus)    Pulmonary fibrosis    PAF (paroxysmal atrial fibrillation)    Hiatal hernia    GIB (gastrointestinal bleeding)    Pericarditis    Shoulder fracture, right    Hematoma    H/O aplastic anemia    History of IBS    H/O osteoporosis    HLD (hyperlipidemia)    PMR (polymyalgia rheumatica)    History of basal cell cancer    Chronic kidney disease (CKD)    Hypotension    Presence of IVC filter    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    History of immunodeficiency    Lumbar compression fracture    H/O hiatal hernia    H/O pulmonary fibrosis    H/O sinus bradycardia    History of fracture of right hip    MEDICATIONS  (STANDING):  albuterol/ipratropium for Nebulization 3 milliLiter(s) Nebulizer every 6 hours  aMIOdarone    Tablet 100 milliGRAM(s) Oral daily  chlorhexidine 2% Cloths 1 Application(s) Topical <User Schedule>  dexMEDEtomidine Infusion 0.2 MICROgram(s)/kG/Hr (3.6 mL/Hr) IV Continuous <Continuous>  dextrose 5%. 1000 milliLiter(s) (50 mL/Hr) IV Continuous <Continuous>  dextrose 5%. 1000 milliLiter(s) (100 mL/Hr) IV Continuous <Continuous>  dextrose 50% Injectable 25 Gram(s) IV Push once  dextrose 50% Injectable 12.5 Gram(s) IV Push once  dextrose 50% Injectable 25 Gram(s) IV Push once  fluticasone propionate/ salmeterol 500-50 MICROgram(s) Diskus 1 Dose(s) Inhalation two times a day  furosemide   Injectable 40 milliGRAM(s) IV Push daily  gabapentin 400 milliGRAM(s) Oral every 12 hours  glucagon  Injectable 1 milliGRAM(s) IntraMuscular once  hydrocortisone sodium succinate Injectable 50 milliGRAM(s) IV Push every 8 hours  insulin lispro (ADMELOG) corrective regimen sliding scale   SubCutaneous every 6 hours  pantoprazole  Injectable 40 milliGRAM(s) IV Push daily  piperacillin/tazobactam IVPB.. 3.375 Gram(s) IV Intermittent every 8 hours  sodium chloride 3%  Inhalation 4 milliLiter(s) Inhalation every 6 hours    MEDICATIONS  (PRN):  acetaminophen     Tablet .. 650 milliGRAM(s) Oral every 6 hours PRN Mild Pain (1 - 3)  dextrose Oral Gel 15 Gram(s) Oral once PRN Blood Glucose LESS THAN 70 milliGRAM(s)/deciliter    T(C): 37.6 (06-23-25 @ 12:00), Max: 37.6 (06-23-25 @ 11:00)  HR: 100 (06-23-25 @ 13:37) (71 - 103)  BP: 121/57 (06-23-25 @ 12:00) (92/49 - 136/76)  RR: 17 (06-23-25 @ 12:00)  SpO2: 91% (06-23-25 @ 13:37)  Wt(kg): --  I&O's Detail    22 Jun 2025 07:01  -  23 Jun 2025 07:00  --------------------------------------------------------  IN:    Dexmedetomidine: 38.7 mL    IV PiggyBack: 250 mL    IV PiggyBack: 50 mL    IV PiggyBack: 200 mL  Total IN: 538.7 mL    OUT:    Voided (mL): 1 mL  Total OUT: 1 mL    Total NET: 537.7 mL      23 Jun 2025 07:01  -  23 Jun 2025 13:51  --------------------------------------------------------  IN:    PRBCs (Packed Red Blood Cells): 327 mL  Total IN: 327 mL    OUT:    Dexmedetomidine: 0 mL  Total OUT: 0 mL    Total NET: 327 mL                        PHYSICAL EXAM:  General: No distress  Respiratory: b/l air entry  Cardiovascular: S1 S2  Gastrointestinal: soft  Extremities:  edema          LABORATORY:                        7.2    7.37  )-----------( 190      ( 23 Jun 2025 05:20 )             24.4     06-23    145  |  104  |  36[H]  ----------------------------<  86  4.2   |  40[H]  |  1.00    Ca    9.1      23 Jun 2025 05:20  Phos  3.3     06-23  Mg     2.0     06-23    TPro  5.4[L]  /  Alb  2.7[L]  /  TBili  0.6  /  DBili  x   /  AST  3[L]  /  ALT  14  /  AlkPhos  52  06-23    Sodium: 145 mmol/L (06-23 @ 05:20)  Sodium: 146 mmol/L (06-22 @ 07:40)    Potassium: 4.2 mmol/L (06-23 @ 05:20)  Potassium: 4.3 mmol/L (06-22 @ 07:40)    Hemoglobin: 7.2 g/dL (06-23 @ 05:20)  Hemoglobin: 8.2 g/dL (06-22 @ 23:14)  Hemoglobin: 7.9 g/dL (06-22 @ 12:30)  Hemoglobin: 8.3 g/dL (06-22 @ 07:40)    Creatinine, Serum 1.00 (06-23 @ 05:20)  Creatinine, Serum 0.88 (06-22 @ 07:40)  Creatinine, Serum 1.10 (06-21 @ 09:35)        LIVER FUNCTIONS - ( 23 Jun 2025 05:20 )  Alb: 2.7 g/dL / Pro: 5.4 g/dL / ALK PHOS: 52 U/L / ALT: 14 U/L / AST: 3 U/L / GGT: x           Urinalysis Basic - ( 23 Jun 2025 05:20 )    Color: x / Appearance: x / SG: x / pH: x  Gluc: 86 mg/dL / Ketone: x  / Bili: x / Urobili: x   Blood: x / Protein: x / Nitrite: x   Leuk Esterase: x / RBC: x / WBC x   Sq Epi: x / Non Sq Epi: x / Bacteria: x      ABG - ( 23 Jun 2025 02:08 )  pH, Arterial: 7.40  pH, Blood: x     /  pCO2: 67    /  pO2: 85    / HCO3: 42    / Base Excess: 13.5  /  SaO2: x

## 2025-06-23 NOTE — DISCHARGE NOTE NURSING/CASE MANAGEMENT/SOCIAL WORK - NSDCCRNAME_GEN_ALL_CORE_FT
Raffi Gee is calling to request a refill on the following medication(s):    Medication Request:  Requested Prescriptions     Pending Prescriptions Disp Refills    rizatriptan (MAXALT) 10 MG tablet [Pharmacy Med Name: rizatriptan 10 mg tablet] 9 tablet 1     Sig: TAKE ONE TABLET BY MOUTH ONCE AS NEEDED FOR MIGRAINE MAY REPEAT DOSE IN TWO HOURS IF NEEDED       Last Visit Date (If Applicable):  4/14/2025    Next Visit Date:    Visit date not found              
[facility]

## 2025-06-23 NOTE — PROGRESS NOTE ADULT - ASSESSMENT
84 y/o female with pmhx of afib on eliquis, COPD, CKD, aplastic anemia requiring prbc, polymyalgia rheumatica on steroids, AVN of hips, HFpEF who was admitted on 6/8 with pna c/b septic shock requiring pressors transferred to floors. hospital course c/b hemoptysis in setting of restarting AC, now on hold. Transferred back to ICU on 6/22 after RRT for AMS in setting of hypercapnic respiratory failure due to bipap noncompliance      1. hypercapnic respiratory failure  2. metabolic encephalopathy    - bipap QHS and PRN  - duonebs prn  - advair  - CT chest with worsening consolidations, restarted on abx--zosyn.  - solu-cortef 50 q8 hrs  - amio po. afib rate controlled  - lasix daily

## 2025-06-23 NOTE — PROGRESS NOTE ADULT - PROBLEM SELECTOR PLAN 3
- The patient is noted to have blood-tinged sputum  - requiring PRBC transfusions ~8 weeks (via Rt subclavian mediport)  - Hgb 7.6 (baseline appears to be ~8)   - transfuse <7-7.5  - 6/12 heparin gtt started  - 6/13 : heparin gtt now discontinued 2/2 hemoptysis  - S/p 2 unit PRBC- will give lasix 40mg IVP x1 in between units -- good response  - ordered 1 unit pRBC on 06/19-- improved hgb   - restarted Eliquis 2.5mg BID - but could not tolerata pt possible bleeding upper lung - stop eliquis as per dr arita  -  drop  o2 sat to 70 on 9 to 10 lit nc - now on high flow 50 lit  -ordered 1pRBC on 06/23

## 2025-06-23 NOTE — PROGRESS NOTE ADULT - ATTENDING COMMENTS
84 female PMH of A-fib on Eliquis, COPD, CKD, aplastic anemia, polymyalgia rheumatica, on chronic steroids, avascular necrosis of hips, HLD, HTN, DM, recent admission to [facility] 5/26 through 6/5/2025 for CHF/fluid overload/COPD exacerbation, presents to the ED form [facility] for evaluation of fever and generalized feeling of being unwell, found to be septic and hypotensive, likely 2/2 multifocal PNA. RRT called for AMS 2/2 CO2 retention, transferred to ICU. S/P BIPAP with improved CO2 and mental status. Anemia 2/2 aplastic anemia, Transfused 1 unit pRBC today.     Rest of care per plan above  d/w residents

## 2025-06-23 NOTE — PROGRESS NOTE ADULT - PROBLEM SELECTOR PLAN 1
Patient found to have hemoptysis possible sec to full ac and cough   - Bronch on 06/16-- showed pooling of blood in RUL, no lesions noted   - Heme onc following dr montano   - Pulm dr. palm following post bronch - neg cult  - Hemoptysis resolved - could not tolerate Eliquis worsening opacity may be possible rt upper lobe bleeding-- will hold eliquis for now-- CT chest showed In comparison with 6/23/2025, increased consolidation and ground-glass   opacity within right upper lobe. New large consolidations within left upper lobe and left lower lobe. Small pleural effusions are increased.

## 2025-06-23 NOTE — PROGRESS NOTE ADULT - SUBJECTIVE AND OBJECTIVE BOX
Patient is a 84y old  Female who presents with a chief complaint of septic shock, PNA (23 Jun 2025 16:05)      BRIEF HOSPITAL COURSE: 82 y/o female with pmhx of afib on eliquis, COPD, CKD, aplastic anemia requiring prbc, polymyalgia rheumatica on steroids, AVN of hips, HFpEF who was admitted on 6/8 with pna c/b septic shock requiring pressors transferred to floors. hospital course c/b hemoptysis in setting of restarting AC, now on hold. Transferred back to ICU on 6/22 after RRT for AMS in setting of hypercapnic respiratory failure due to bipap noncompliance    Events last 24 hours: off precedex. hypercapnia and mental status improved. tolerating NC    PAST MEDICAL & SURGICAL HISTORY:  COPD (chronic obstructive pulmonary disease)      DM (diabetes mellitus)  diet-controlled      PAF (paroxysmal atrial fibrillation)  s/p ablation      Hiatal hernia      H/O aplastic anemia      History of IBS      H/O osteoporosis      HLD (hyperlipidemia)      PMR (polymyalgia rheumatica)      History of basal cell cancer      Chronic kidney disease (CKD)      Hypotension      Presence of IVC filter      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      History of immunodeficiency      Lumbar compression fracture      H/O hiatal hernia      H/O pulmonary fibrosis      H/O sinus bradycardia      History of fracture of right hip  "unoperable"      S/P hysterectomy      S/P foot surgery      S/P knee surgery      After cataract  bilateral eye cataract surgically removed      Closed right hip fracture  rigth hip ORIF july 19 2016      S/P IVC filter  nov 2016      S/P carpal tunnel release  Right 2008 & 6/27/17      H/O kyphoplasty      Port-A-Cath in place  right chest          Review of Systems:  CONSTITUTIONAL: No fever, chills, or fatigue  EYES: No eye pain, visual disturbances, or discharge  ENMT:  No difficulty hearing, tinnitus, vertigo; No sinus or throat pain  NECK: No pain or stiffness  RESPIRATORY: No cough, wheezing, chills or hemoptysis; No shortness of breath  CARDIOVASCULAR: No chest pain, palpitations, dizziness, or leg swelling  GASTROINTESTINAL: No abdominal or epigastric pain. No nausea, vomiting, or hematemesis; No diarrhea or constipation. No melena or hematochezia.  GENITOURINARY: No dysuria, frequency, hematuria, or incontinence  NEUROLOGICAL: No headaches, memory loss, loss of strength, numbness, or tremors  SKIN: No itching, burning, rashes, or lesions   MUSCULOSKELETAL: No joint pain or swelling; No muscle, back, or extremity pain  PSYCHIATRIC: No depression, anxiety, mood swings, or difficulty sleeping      Medications:  piperacillin/tazobactam IVPB.. 3.375 Gram(s) IV Intermittent every 8 hours    aMIOdarone    Tablet 100 milliGRAM(s) Oral daily  furosemide   Injectable 40 milliGRAM(s) IV Push daily    albuterol/ipratropium for Nebulization 3 milliLiter(s) Nebulizer every 6 hours  fluticasone propionate/ salmeterol 500-50 MICROgram(s) Diskus 1 Dose(s) Inhalation two times a day  sodium chloride 3%  Inhalation 4 milliLiter(s) Inhalation every 6 hours    acetaminophen     Tablet .. 650 milliGRAM(s) Oral every 6 hours PRN  gabapentin 400 milliGRAM(s) Oral every 12 hours        pantoprazole  Injectable 40 milliGRAM(s) IV Push daily      dextrose 50% Injectable 25 Gram(s) IV Push once  dextrose 50% Injectable 12.5 Gram(s) IV Push once  dextrose 50% Injectable 25 Gram(s) IV Push once  dextrose Oral Gel 15 Gram(s) Oral once PRN  glucagon  Injectable 1 milliGRAM(s) IntraMuscular once  hydrocortisone sodium succinate Injectable 50 milliGRAM(s) IV Push every 8 hours  insulin lispro (ADMELOG) corrective regimen sliding scale   SubCutaneous every 6 hours    dextrose 5%. 1000 milliLiter(s) IV Continuous <Continuous>  dextrose 5%. 1000 milliLiter(s) IV Continuous <Continuous>      chlorhexidine 2% Cloths 1 Application(s) Topical <User Schedule>            ICU Vital Signs Last 24 Hrs  T(C): 37.4 (23 Jun 2025 20:00), Max: 37.6 (23 Jun 2025 11:00)  T(F): 99.3 (23 Jun 2025 20:00), Max: 99.7 (23 Jun 2025 11:00)  HR: 103 (23 Jun 2025 20:00) (71 - 990)  BP: 141/82 (23 Jun 2025 20:00) (92/49 - 148/68)  BP(mean): 99 (23 Jun 2025 20:00) (61 - 99)  ABP: --  ABP(mean): --  RR: 22 (23 Jun 2025 20:00) (14 - 26)  SpO2: 96% (23 Jun 2025 20:00) (88% - 100%)    O2 Parameters below as of 23 Jun 2025 20:00  Patient On (Oxygen Delivery Method): nasal cannula  O2 Flow (L/min): 5          ABG - ( 23 Jun 2025 02:08 )  pH, Arterial: 7.40  pH, Blood: x     /  pCO2: 67    /  pO2: 85    / HCO3: 42    / Base Excess: 13.5  /  SaO2: x                   I&O's Detail    22 Jun 2025 07:01  -  23 Jun 2025 07:00  --------------------------------------------------------  IN:    Dexmedetomidine: 38.7 mL    IV PiggyBack: 250 mL    IV PiggyBack: 50 mL    IV PiggyBack: 200 mL  Total IN: 538.7 mL    OUT:    Voided (mL): 1 mL  Total OUT: 1 mL    Total NET: 537.7 mL      23 Jun 2025 07:01  -  23 Jun 2025 20:29  --------------------------------------------------------  IN:    IV PiggyBack: 100 mL    Oral Fluid: 240 mL    PRBCs (Packed Red Blood Cells): 327 mL  Total IN: 667 mL    OUT:    Dexmedetomidine: 0 mL    Voided (mL): 1100 mL  Total OUT: 1100 mL    Total NET: -433 mL            LABS:                        9.2    7.82  )-----------( 209      ( 23 Jun 2025 16:45 )             28.7     06-23    145  |  104  |  36[H]  ----------------------------<  86  4.2   |  40[H]  |  1.00    Ca    9.1      23 Jun 2025 05:20  Phos  3.3     06-23  Mg     2.0     06-23    TPro  5.4[L]  /  Alb  2.7[L]  /  TBili  0.6  /  DBili  x   /  AST  3[L]  /  ALT  14  /  AlkPhos  52  06-23          CAPILLARY BLOOD GLUCOSE      POCT Blood Glucose.: 142 mg/dL (23 Jun 2025 17:20)      Urinalysis Basic - ( 23 Jun 2025 05:20 )    Color: x / Appearance: x / SG: x / pH: x  Gluc: 86 mg/dL / Ketone: x  / Bili: x / Urobili: x   Blood: x / Protein: x / Nitrite: x   Leuk Esterase: x / RBC: x / WBC x   Sq Epi: x / Non Sq Epi: x / Bacteria: x      CULTURES:      Physical Examination:    General: No acute distress.      HEENT: Pupils equal, reactive to light.  Symmetric.    PULM: Clear to auscultation bilaterally, no significant sputum production    NECK: Supple, no lymphadenopathy, trachea midline    CVS: Regular rate and rhythm, no murmurs, rubs, or gallops    ABD: Soft, nondistended, nontender, normoactive bowel sounds, no masses    EXT: No edema, nontender    SKIN: Warm and well perfused, no rashes noted.    NEURO: Alert, oriented, interactive, nonfocal    DEVICES:     RADIOLOGY:   IMPRESSION:    In comparison with 6/10/2025, Increased consolidation and ground-glass   opacity within right upper lobe. New large consolidations within left   upper lobe and left lower lobe. Small pleural effusions are increased.    --- End of Report ---            SIRI ISAACS MD; Attending Radiologist  This document has been electronically signed. Jun 22 2025  4:36PM      TIME SPENT:   Time spent on this patient encounter--which includes documenting this note in the electronic medical record-- was 35 minutes including assessing the presenting problems with associated risks, reviewing the medical record to prepare for the encounter, and meeting face to face with the patient to obtain additional history. I have also performed an appropriate physical exam, made interventions listed and ordered and interpreted appropriate diagnostic studies as documented. To improve communication and patient safety, I have coordinated care with the multidisciplinary team including the bedside nurse, appropriate attending of record and consultants as needed.    Please note: Date of entry of this note is equal to the date of services rendered.

## 2025-06-23 NOTE — PROGRESS NOTE ADULT - SUBJECTIVE AND OBJECTIVE BOX
CHIEF COMPLAINT/ REASON FOR VISIT  .. Patient was seen to address the  issue listed under PROBLEM LIST which is located toward bottom of this note     [facility]    Allergies    No Known Allergies    Intolerances        PAST MEDICAL & SURGICAL HISTORY:  COPD (chronic obstructive pulmonary disease)      DM (diabetes mellitus)  diet-controlled      PAF (paroxysmal atrial fibrillation)  s/p ablation      Hiatal hernia      H/O aplastic anemia      History of IBS      H/O osteoporosis      HLD (hyperlipidemia)      PMR (polymyalgia rheumatica)      History of basal cell cancer      Chronic kidney disease (CKD)      Hypotension      Presence of IVC filter      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      History of immunodeficiency      Lumbar compression fracture      H/O hiatal hernia      H/O pulmonary fibrosis      H/O sinus bradycardia      History of fracture of right hip  "unoperable"      S/P hysterectomy      S/P foot surgery      S/P knee surgery      After cataract  bilateral eye cataract surgically removed      Closed right hip fracture  rigth hip ORIF july 19 2016      S/P IVC filter  nov 2016      S/P carpal tunnel release  Right 2008 & 6/27/17      H/O kyphoplasty      Port-A-Cath in place  right chest          FAMILY HISTORY:  Family history of bladder cancer (Mother)    Family history of myocardial infarction (Father)    FH: atrial fibrillation (Father)        Home Medications:  amiodarone 200 mg oral tablet: 0.5 tab(s) orally once a day (08 Jun 2025 16:43)  Bentyl 10 mg oral capsule: 1 cap(s) orally 2 times a day, As Needed (08 Jun 2025 16:43)  Eliquis 2.5 mg oral tablet: 1 tab(s) orally 2 times a day (08 Jun 2025 16:43)  fluticasone-salmeterol 500 mcg-50 mcg/inh inhalation powder: 1 puff(s) inhaled 2 times a day (08 Jun 2025 16:43)  folic acid 1 mg oral tablet: 1 tab(s) orally once a day (in the morning) (08 Jun 2025 16:43)  gabapentin 400 mg oral capsule: 1 cap(s) orally 2 times a day (08 Jun 2025 16:43)  ipratropium-albuterol 0.5 mg-2.5 mg/3 mL inhalation solution: 3 milliliter(s) inhaled every 6 hours As needed Shortness of Breath and/or Wheezing (08 Jun 2025 16:43)  IVIG monthly:  (08 Jun 2025 16:43)  leflunomide 10 mg oral tablet: 1 tab(s) orally once a day (08 Jun 2025 16:43)  midodrine 10 mg oral tablet: 1 tab(s) orally every 8 hours (08 Jun 2025 16:43)  montelukast 10 mg oral tablet: 1 tab(s) orally once a day (08 Jun 2025 16:43)  pantoprazole 40 mg oral delayed release tablet: 1 tab(s) orally once a day (before a meal) (08 Jun 2025 16:43)  predniSONE 5 mg oral tablet: 1 tab(s) orally once a day (08 Jun 2025 16:43)  Procrit 10,000 units/mL preservative-free injectable solution: injectable once a week WEDNESDAY (08 Jun 2025 16:43)  Reclast Infusion , IV x 1 yearly:  (08 Jun 2025 16:43)  simvastatin 10 mg oral tablet: 1 tab(s) orally once a day (at bedtime) (08 Jun 2025 16:43)  tiotropium 2.5 mcg/inh inhalation aerosol: 2 puff(s) inhaled once a day (08 Jun 2025 16:43)  tiZANidine: 2 tab(s) orally once a day (at bedtime) as needed for  muscle spasm (08 Jun 2025 16:43)  torsemide 20 mg oral tablet: 1 tab(s) orally once a day (in the morning) (08 Jun 2025 16:49)      MEDICATIONS  (STANDING):  albuterol/ipratropium for Nebulization 3 milliLiter(s) Nebulizer every 6 hours  aMIOdarone    Tablet 100 milliGRAM(s) Oral daily  azithromycin  IVPB 500 milliGRAM(s) IV Intermittent every 24 hours  azithromycin  IVPB      chlorhexidine 2% Cloths 1 Application(s) Topical <User Schedule>  dexMEDEtomidine Infusion 0.2 MICROgram(s)/kG/Hr (3.6 mL/Hr) IV Continuous <Continuous>  dextrose 5%. 1000 milliLiter(s) (50 mL/Hr) IV Continuous <Continuous>  dextrose 5%. 1000 milliLiter(s) (100 mL/Hr) IV Continuous <Continuous>  dextrose 50% Injectable 25 Gram(s) IV Push once  dextrose 50% Injectable 12.5 Gram(s) IV Push once  dextrose 50% Injectable 25 Gram(s) IV Push once  fluticasone propionate/ salmeterol 500-50 MICROgram(s) Diskus 1 Dose(s) Inhalation two times a day  furosemide   Injectable 40 milliGRAM(s) IV Push daily  gabapentin 400 milliGRAM(s) Oral every 12 hours  glucagon  Injectable 1 milliGRAM(s) IntraMuscular once  hydrocortisone sodium succinate Injectable 50 milliGRAM(s) IV Push every 8 hours  insulin lispro (ADMELOG) corrective regimen sliding scale   SubCutaneous every 6 hours  pantoprazole  Injectable 40 milliGRAM(s) IV Push daily  piperacillin/tazobactam IVPB.. 3.375 Gram(s) IV Intermittent every 8 hours  sodium chloride 3%  Inhalation 4 milliLiter(s) Inhalation every 6 hours    MEDICATIONS  (PRN):  acetaminophen     Tablet .. 650 milliGRAM(s) Oral every 6 hours PRN Mild Pain (1 - 3)  dextrose Oral Gel 15 Gram(s) Oral once PRN Blood Glucose LESS THAN 70 milliGRAM(s)/deciliter      Diet, NPO (06-22-25 @ 20:27) [Active]          Vital Signs Last 24 Hrs  T(C): 37.5 (23 Jun 2025 06:05), Max: 37.5 (23 Jun 2025 04:04)  T(F): 99.5 (23 Jun 2025 06:05), Max: 99.5 (23 Jun 2025 04:04)  HR: 78 (23 Jun 2025 06:05) (72 - 89)  BP: 96/55 (23 Jun 2025 06:05) (92/49 - 119/71)  BP(mean): 65 (23 Jun 2025 06:05) (63 - 85)  RR: 18 (23 Jun 2025 06:05) (14 - 25)  SpO2: 97% (23 Jun 2025 06:05) (88% - 100%)    Parameters below as of 23 Jun 2025 02:30  Patient On (Oxygen Delivery Method): BiPAP/CPAP          06-22-25 @ 07:01  -  06-23-25 @ 06:17  --------------------------------------------------------  IN: 431.5 mL / OUT: 1 mL / NET: 430.5 mL              LABS:                        7.2    7.37  )-----------( 190      ( 23 Jun 2025 05:20 )             24.4     06-22    146[H]  |  104  |  31[H]  ----------------------------<  117[H]  4.3   |  39[H]  |  0.88    Ca    9.0      22 Jun 2025 07:40  Phos  2.7     06-21  Mg     1.7     06-21    TPro  5.9[L]  /  Alb  2.9[L]  /  TBili  0.7  /  DBili  x   /  AST  <3[L]  /  ALT  21  /  AlkPhos  58  06-22      Urinalysis Basic - ( 22 Jun 2025 07:40 )    Color: x / Appearance: x / SG: x / pH: x  Gluc: 117 mg/dL / Ketone: x  / Bili: x / Urobili: x   Blood: x / Protein: x / Nitrite: x   Leuk Esterase: x / RBC: x / WBC x   Sq Epi: x / Non Sq Epi: x / Bacteria: x        ABG - ( 23 Jun 2025 02:08 )  pH, Arterial: 7.40  pH, Blood: x     /  pCO2: 67    /  pO2: 85    / HCO3: 42    / Base Excess: 13.5  /  SaO2: x                   WBC:  WBC Count: 7.37 K/uL (06-23 @ 05:20)  WBC Count: 15.13 K/uL (06-22 @ 23:14)  WBC Count: 7.28 K/uL (06-22 @ 07:40)  WBC Count: 3.82 K/uL (06-21 @ 06:15)  WBC Count: 8.70 K/uL (06-20 @ 08:15)  WBC Count: 9.83 K/uL (06-19 @ 06:55)      MICROBIOLOGY:  RECENT CULTURES:  06-16 Bronch Wash Bronchial Wash XXXX   Rare Squamous epithelial cells seen per low power field  No polymorphonuclear leukocytes seen per low power field  No organisms seen per oil power field   Commensal marilee consistent with body site    06-16 Bronch Wash Bronchial Wash XXXX XXXX   Culture is being performed.    06-16 Bronch Wash Bronchial Wash XXXX   Rare polymorphonuclear leukocytes per oil power field  No Squamous epithelial cells per oil power field  No organisms seen per oil power field   Commensal marilee consistent with body site                    Sodium:  Sodium: 146 mmol/L (06-22 @ 07:40)  Sodium: 146 mmol/L (06-21 @ 09:35)  Sodium: 144 mmol/L (06-20 @ 08:15)  Sodium: 144 mmol/L (06-19 @ 06:55)      0.88 mg/dL 06-22 @ 07:40  1.10 mg/dL 06-21 @ 09:35  0.85 mg/dL 06-20 @ 08:15  0.94 mg/dL 06-19 @ 06:55      Hemoglobin:  Hemoglobin: 7.2 g/dL (06-23 @ 05:20)  Hemoglobin: 8.2 g/dL (06-22 @ 23:14)  Hemoglobin: 7.9 g/dL (06-22 @ 12:30)  Hemoglobin: 8.3 g/dL (06-22 @ 07:40)  Hemoglobin: 9.3 g/dL (06-21 @ 06:15)  Hemoglobin: 9.2 g/dL (06-20 @ 08:15)  Hemoglobin: 7.8 g/dL (06-19 @ 06:55)      Platelets: Platelet Count - Automated: 190 K/uL (06-23 @ 05:20)  Platelet Count - Automated: 259 K/uL (06-22 @ 23:14)  Platelet Count - Automated: 253 K/uL (06-22 @ 07:40)  Platelet Count - Automated: 707 K/uL (06-21 @ 06:15)  Platelet Count - Automated: 277 K/uL (06-20 @ 08:15)  Platelet Count - Automated: 268 K/uL (06-19 @ 06:55)      LIVER FUNCTIONS - ( 22 Jun 2025 07:40 )  Alb: 2.9 g/dL / Pro: 5.9 g/dL / ALK PHOS: 58 U/L / ALT: 21 U/L / AST: <3 U/L / GGT: x             Urinalysis Basic - ( 22 Jun 2025 07:40 )    Color: x / Appearance: x / SG: x / pH: x  Gluc: 117 mg/dL / Ketone: x  / Bili: x / Urobili: x   Blood: x / Protein: x / Nitrite: x   Leuk Esterase: x / RBC: x / WBC x   Sq Epi: x / Non Sq Epi: x / Bacteria: x        RADIOLOGY & ADDITIONAL STUDIES:      MICROBIOLOGY:  RECENT CULTURES:  06-16 Bronch Wash Bronchial Wash XXXX   Rare Squamous epithelial cells seen per low power field  No polymorphonuclear leukocytes seen per low power field  No organisms seen per oil power field   Commensal marilee consistent with body site    06-16 Bronch Wash Bronchial Wash XXXX XXXX   Culture is being performed.    06-16 Bronch Wash Bronchial Wash XXXX   Rare polymorphonuclear leukocytes per oil power field  No Squamous epithelial cells per oil power field  No organisms seen per oil power field   Commensal marilee consistent with body site

## 2025-06-23 NOTE — PROGRESS NOTE ADULT - PROBLEM SELECTOR PLAN 12
#Edema:  trace edema  -holding home Torsemide 20 mg daily as mild hypernatremia and prerenal azotemia  -continue lasix 40mg IV for now, monitor bmp     #VTE ppx: - eliquis   #GI ppx: iv ppi  #Diet: Diet, Regular: DASH/TLC {Sodium & Cholesterol Restricted} (06-08-25 @ 15:56) [Active]  #Activity: Activity - Increase As Tolerated:   Time/Priority:  Routine (06-08-25 @ 14:16) [Active]  #ACP: full code  #Dispo: further plans pending clinical course

## 2025-06-23 NOTE — PROGRESS NOTE ADULT - SUBJECTIVE AND OBJECTIVE BOX
Patient is a 84y old  Female who presents with a chief complaint of septic shock, PNA (22 Jun 2025 08:03)    INTERVAL HPI/OVERNIGHT EVENTS: Rapid response called on patient due to AMS yesterday, likely 2/2 to CO2 retention. Was placed on bipap and precedex in the ICU. Shook her head to deny any acute complaints by bedside.     MEDICATIONS  (STANDING):  albuterol/ipratropium for Nebulization 3 milliLiter(s) Nebulizer every 6 hours  aMIOdarone    Tablet 100 milliGRAM(s) Oral daily  atorvastatin 10 milliGRAM(s) Oral at bedtime  chlorhexidine 2% Cloths 1 Application(s) Topical <User Schedule>  dextrose 50% Injectable 25 Gram(s) IV Push once  dextrose 50% Injectable 12.5 Gram(s) IV Push once  dextrose 50% Injectable 25 Gram(s) IV Push once  fluticasone propionate/ salmeterol 500-50 MICROgram(s) Diskus 1 Dose(s) Inhalation two times a day  gabapentin 400 milliGRAM(s) Oral every 12 hours  insulin lispro (ADMELOG) corrective regimen sliding scale   SubCutaneous three times a day before meals  insulin lispro (ADMELOG) corrective regimen sliding scale   SubCutaneous at bedtime  pantoprazole    Tablet 40 milliGRAM(s) Oral before breakfast  predniSONE   Tablet 5 milliGRAM(s) Oral daily    MEDICATIONS  (PRN):  acetaminophen     Tablet .. 650 milliGRAM(s) Oral every 6 hours PRN Mild Pain (1 - 3)  dextrose Oral Gel 15 Gram(s) Oral once PRN Blood Glucose LESS THAN 70 milliGRAM(s)/deciliter      Allergies    No Known Allergies    Intolerances        REVIEW OF SYSTEMS: Denies other than mentioned in HPI    Vital Signs Last 24 Hrs  T(C): 37.6 (23 Jun 2025 12:00), Max: 37.6 (23 Jun 2025 11:00)  T(F): 99.7 (23 Jun 2025 12:00), Max: 99.7 (23 Jun 2025 11:00)  HR: 86 (23 Jun 2025 12:00) (71 - 94)  BP: 121/57 (23 Jun 2025 12:00) (92/49 - 123/70)  BP(mean): 76 (23 Jun 2025 12:00) (61 - 95)  RR: 17 (23 Jun 2025 12:00) (14 - 26)  SpO2: 91% (23 Jun 2025 12:00) (88% - 100%)    Parameters below as of 23 Jun 2025 09:00  Patient On (Oxygen Delivery Method): nasal cannula    O2 Concentration (%): 5      PHYSICAL EXAM:  GENERAL: NAD, elderly female, appears fatigued   HEENT:  anicteric, moist mucous membranes  CHEST/LUNG:  crackles notable on left lower lobe   HEART:  RRR, S1, S2  ABDOMEN:  BS+, soft, nontender, nondistended  MUSCULOSKELETAL: trace LE edema bilaterally  NEUROLOGIC: follows commands appropriately      LABS:  cret                        7.2    7.37  )-----------( 190      ( 23 Jun 2025 05:20 )             24.4     06-23    145  |  104  |  36[H]  ----------------------------<  86  4.2   |  40[H]  |  1.00    Ca    9.1      23 Jun 2025 05:20  Phos  3.3     06-23  Mg     2.0     06-23    TPro  5.4[L]  /  Alb  2.7[L]  /  TBili  0.6  /  DBili  x   /  AST  3[L]  /  ALT  14  /  AlkPhos  52  06-23

## 2025-06-23 NOTE — PROGRESS NOTE ADULT - SUBJECTIVE AND OBJECTIVE BOX
[facility] - Sai Valle, Manuel, Girish, El, David López  Office Number:  [phone]    Patient resting comfortably in bed in NAD.  She is on BIPAP this AM.  She is lethargic but arousable.  Had RRT yesterday for AMS and CO2 narcosis.  Placed on BIPAP after sedation.     F/U for:  Acute hypoxic respiratory failure      MEDICATIONS  (STANDING):  albuterol/ipratropium for Nebulization 3 milliLiter(s) Nebulizer every 6 hours  aMIOdarone    Tablet 100 milliGRAM(s) Oral daily  azithromycin  IVPB 500 milliGRAM(s) IV Intermittent every 24 hours  azithromycin  IVPB      chlorhexidine 2% Cloths 1 Application(s) Topical <User Schedule>  dexMEDEtomidine Infusion 0.2 MICROgram(s)/kG/Hr (3.6 mL/Hr) IV Continuous <Continuous>  dextrose 5%. 1000 milliLiter(s) (50 mL/Hr) IV Continuous <Continuous>  dextrose 5%. 1000 milliLiter(s) (100 mL/Hr) IV Continuous <Continuous>  dextrose 50% Injectable 25 Gram(s) IV Push once  dextrose 50% Injectable 12.5 Gram(s) IV Push once  dextrose 50% Injectable 25 Gram(s) IV Push once  fluticasone propionate/ salmeterol 500-50 MICROgram(s) Diskus 1 Dose(s) Inhalation two times a day  furosemide   Injectable 40 milliGRAM(s) IV Push daily  gabapentin 400 milliGRAM(s) Oral every 12 hours  glucagon  Injectable 1 milliGRAM(s) IntraMuscular once  hydrocortisone sodium succinate Injectable 50 milliGRAM(s) IV Push every 8 hours  insulin lispro (ADMELOG) corrective regimen sliding scale   SubCutaneous every 6 hours  pantoprazole  Injectable 40 milliGRAM(s) IV Push daily  piperacillin/tazobactam IVPB.. 3.375 Gram(s) IV Intermittent every 8 hours  sodium chloride 3%  Inhalation 4 milliLiter(s) Inhalation every 6 hours    MEDICATIONS  (PRN):  acetaminophen     Tablet .. 650 milliGRAM(s) Oral every 6 hours PRN Mild Pain (1 - 3)  dextrose Oral Gel 15 Gram(s) Oral once PRN Blood Glucose LESS THAN 70 milliGRAM(s)/deciliter      Allergies    No Known Allergies    Intolerances        Vital Signs Last 24 Hrs  T(C): 37.4 (23 Jun 2025 07:39), Max: 37.5 (23 Jun 2025 04:04)  T(F): 99.3 (23 Jun 2025 07:39), Max: 99.5 (23 Jun 2025 04:04)  HR: 80 (23 Jun 2025 07:43) (71 - 89)  BP: 95/45 (23 Jun 2025 07:00) (92/49 - 119/71)  BP(mean): 61 (23 Jun 2025 07:00) (61 - 85)  RR: 26 (23 Jun 2025 07:00) (14 - 26)  SpO2: 100% (23 Jun 2025 07:43) (88% - 100%)    Parameters below as of 23 Jun 2025 07:43  Patient On (Oxygen Delivery Method): BiPAP/CPAP        I&O's Summary    22 Jun 2025 07:01  -  23 Jun 2025 07:00  --------------------------------------------------------  IN: 431.5 mL / OUT: 1 mL / NET: 430.5 mL        ON EXAM:    Constitutional: NAD, on bipap, lethargic but arousable  HEENT: Moist Mucous Membranes, Anicteric  Pulmonary: Non-labored, breath sounds are wheezing   Cardiovascular: Regular, S1 and S2 nl, No murmurs, rubs, gallops or clicks  Gastrointestinal: Bowel Sounds present, soft, nontender.   Lymph:+ peripheral edema.  Skin: No visible rashes or ulcers.  Psych:  Unable to properly assess    LABS: All Labs Reviewed:                        7.2    7.37  )-----------( 190      ( 23 Jun 2025 05:20 )             24.4                         8.2    15.13 )-----------( 259      ( 22 Jun 2025 23:14 )             28.2                         7.9    x     )-----------( x        ( 22 Jun 2025 12:30 )             26.2     23 Jun 2025 05:20    145    |  104    |  36     ----------------------------<  86     4.2     |  40     |  1.00   22 Jun 2025 07:40    146    |  104    |  31     ----------------------------<  117    4.3     |  39     |  0.88   21 Jun 2025 09:35    146    |  103    |  30     ----------------------------<  230    3.9     |  39     |  1.10     Ca    9.1        23 Jun 2025 05:20  Ca    9.0        22 Jun 2025 07:40  Ca    8.8        21 Jun 2025 09:35  Phos  3.3       23 Jun 2025 05:20  Phos  2.7       21 Jun 2025 09:35  Mg     2.0       23 Jun 2025 05:20  Mg     1.7       21 Jun 2025 09:35    TPro  5.4    /  Alb  2.7    /  TBili  0.6    /  DBili  x      /  AST  3      /  ALT  14     /  AlkPhos  52     23 Jun 2025 05:20  TPro  5.9    /  Alb  2.9    /  TBili  0.7    /  DBili  x      /  AST  <3     /  ALT  21     /  AlkPhos  58     22 Jun 2025 07:40  TPro  5.4    /  Alb  2.8    /  TBili  0.6    /  DBili  x      /  AST  9      /  ALT  21     /  AlkPhos  43     21 Jun 2025 09:35

## 2025-06-23 NOTE — PROGRESS NOTE ADULT - SUBJECTIVE AND OBJECTIVE BOX
INTERVAL HPI/OVERNIGHT EVENTS: No acute overnight events occurred.    SUBJECTIVE: Seen and examined pt at bedside. Has no acute complaints at this time.    Review of Systems:  Constitutional: No fever, chills, fatigue  Neuro: No headache, numbness, weakness  Resp: No cough, wheezing, shortness of breath  CVS: No chest pain, palpitations, leg swelling  GI: No abdominal pain, nausea, vomiting, diarrhea   : No dysuria, frequency, incontinence  Skin: No itching, burning, rashes, or lesions   Msk: No joint pain or swelling  Psych: No depression, anxiety, mood swings    ICU Vital Signs Last 24 Hrs  T(C): 37.4 (23 Jun 2025 07:39), Max: 37.5 (23 Jun 2025 04:04)  T(F): 99.3 (23 Jun 2025 07:39), Max: 99.5 (23 Jun 2025 04:04)  HR: 80 (23 Jun 2025 07:43) (71 - 89)  BP: 95/45 (23 Jun 2025 07:00) (92/49 - 119/71)  BP(mean): 61 (23 Jun 2025 07:00) (61 - 85)  ABP: --  ABP(mean): --  RR: 26 (23 Jun 2025 07:00) (14 - 26)  SpO2: 100% (23 Jun 2025 07:43) (88% - 100%)    O2 Parameters below as of 23 Jun 2025 07:43  Patient On (Oxygen Delivery Method): BiPAP/CPAP              06-22-25 @ 07:01  -  06-23-25 @ 07:00  --------------------------------------------------------  IN: 431.5 mL / OUT: 1 mL / NET: 430.5 mL        CAPILLARY BLOOD GLUCOSE      POCT Blood Glucose.: 155 mg/dL (23 Jun 2025 00:24)      I&O's Summary    22 Jun 2025 07:01  -  23 Jun 2025 07:00  --------------------------------------------------------  IN: 431.5 mL / OUT: 1 mL / NET: 430.5 mL        PHYSICAL EXAM:  General: NAD  Neurology: awake and alert  HEENT: NC/AT  Respiratory: CTA b/l, no rales or rhonchi noted  Cardiovascular: RRR, normal S1S2, no M/R/G  Abdomen: soft, NT/ND, +BS, no palpable masses  Extremities: WWP, no clubbing, cyanosis, or edema  Skin: warm/dry      Meds:  azithromycin  IVPB IV Intermittent  azithromycin  IVPB   piperacillin/tazobactam IVPB.. IV Intermittent    aMIOdarone    Tablet Oral  furosemide   Injectable IV Push    dextrose 50% Injectable IV Push  dextrose 50% Injectable IV Push  dextrose 50% Injectable IV Push  dextrose Oral Gel Oral PRN  glucagon  Injectable IntraMuscular  hydrocortisone sodium succinate Injectable IV Push  insulin lispro (ADMELOG) corrective regimen sliding scale SubCutaneous    albuterol/ipratropium for Nebulization Nebulizer  fluticasone propionate/ salmeterol 500-50 MICROgram(s) Diskus Inhalation  sodium chloride 3%  Inhalation Inhalation    acetaminophen     Tablet .. Oral PRN  dexMEDEtomidine Infusion IV Continuous  gabapentin Oral        pantoprazole  Injectable IV Push      dextrose 5%. IV Continuous  dextrose 5%. IV Continuous      chlorhexidine 2% Cloths Topical                              7.2    7.37  )-----------( 190      ( 23 Jun 2025 05:20 )             24.4       06-23    145  |  104  |  36[H]  ----------------------------<  86  4.2   |  40[H]  |  1.00    Ca    9.1      23 Jun 2025 05:20  Phos  3.3     06-23  Mg     2.0     06-23    TPro  5.4[L]  /  Alb  2.7[L]  /  TBili  0.6  /  DBili  x   /  AST  3[L]  /  ALT  14  /  AlkPhos  52  06-23            Urinalysis Basic - ( 23 Jun 2025 05:20 )    Color: x / Appearance: x / SG: x / pH: x  Gluc: 86 mg/dL / Ketone: x  / Bili: x / Urobili: x   Blood: x / Protein: x / Nitrite: x   Leuk Esterase: x / RBC: x / WBC x   Sq Epi: x / Non Sq Epi: x / Bacteria: x                  Radiology:    Bedside Ultrasound:    Tubes/Lines:      GLOBAL ISSUE/BEST PRACTICE:  Analgesia:  Sedation:  HOB elevation: Y  Stress ulcer prophylaxis:  VTE prophylaxis:  Glycemic control:  Nutrition:    CODE STATUS:        INTERVAL HPI/OVERNIGHT EVENTS: No acute overnight events occurred.    SUBJECTIVE: Seen and examined pt at bedside. Has no acute complaints at this time. Pt is on Bipap this morning. Pt is awake and responsive to questions. Pt is off precedex.     Review of Systems:  Constitutional: No fever, chills, fatigue  Neuro: No headache, numbness, weakness  Resp: No cough, wheezing, shortness of breath  CVS: No chest pain, palpitations, leg swelling  GI: No abdominal pain, nausea, vomiting, diarrhea   : No dysuria, frequency, incontinence  Skin: No itching, burning, rashes, or lesions   Msk: No joint pain or swelling    ICU Vital Signs Last 24 Hrs  T(C): 37.4 (23 Jun 2025 07:39), Max: 37.5 (23 Jun 2025 04:04)  T(F): 99.3 (23 Jun 2025 07:39), Max: 99.5 (23 Jun 2025 04:04)  HR: 80 (23 Jun 2025 07:43) (71 - 89)  BP: 95/45 (23 Jun 2025 07:00) (92/49 - 119/71)  BP(mean): 61 (23 Jun 2025 07:00) (61 - 85)  ABP: --  ABP(mean): --  RR: 26 (23 Jun 2025 07:00) (14 - 26)  SpO2: 100% (23 Jun 2025 07:43) (88% - 100%)    O2 Parameters below as of 23 Jun 2025 07:43  Patient On (Oxygen Delivery Method): BiPAP/CPAP              06-22-25 @ 07:01  -  06-23-25 @ 07:00  --------------------------------------------------------  IN: 431.5 mL / OUT: 1 mL / NET: 430.5 mL        CAPILLARY BLOOD GLUCOSE      POCT Blood Glucose.: 155 mg/dL (23 Jun 2025 00:24)      I&O's Summary    22 Jun 2025 07:01  -  23 Jun 2025 07:00  --------------------------------------------------------  IN: 431.5 mL / OUT: 1 mL / NET: 430.5 mL        PHYSICAL EXAM:  General: NAD, on Bipap   Neurology: awake and alert  HEENT: NC/AT  Respiratory: CTA b/l, no rales or rhonchi noted  Cardiovascular: RRR, normal S1S2, no M/R/G  Abdomen: soft, NT/ND, +BS, no palpable masses  Extremities: b/l 2+ pitting edema of feet, no clubbing, cyanosis,  Skin: warm/dry      Meds:  azithromycin  IVPB IV Intermittent  azithromycin  IVPB   piperacillin/tazobactam IVPB.. IV Intermittent    aMIOdarone    Tablet Oral  furosemide   Injectable IV Push    dextrose 50% Injectable IV Push  dextrose 50% Injectable IV Push  dextrose 50% Injectable IV Push  dextrose Oral Gel Oral PRN  glucagon  Injectable IntraMuscular  hydrocortisone sodium succinate Injectable IV Push  insulin lispro (ADMELOG) corrective regimen sliding scale SubCutaneous    albuterol/ipratropium for Nebulization Nebulizer  fluticasone propionate/ salmeterol 500-50 MICROgram(s) Diskus Inhalation  sodium chloride 3%  Inhalation Inhalation    acetaminophen     Tablet .. Oral PRN  dexMEDEtomidine Infusion IV Continuous  gabapentin Oral        pantoprazole  Injectable IV Push      dextrose 5%. IV Continuous  dextrose 5%. IV Continuous      chlorhexidine 2% Cloths Topical                              7.2    7.37  )-----------( 190      ( 23 Jun 2025 05:20 )             24.4       06-23    145  |  104  |  36[H]  ----------------------------<  86  4.2   |  40[H]  |  1.00    Ca    9.1      23 Jun 2025 05:20  Phos  3.3     06-23  Mg     2.0     06-23    TPro  5.4[L]  /  Alb  2.7[L]  /  TBili  0.6  /  DBili  x   /  AST  3[L]  /  ALT  14  /  AlkPhos  52  06-23            Urinalysis Basic - ( 23 Jun 2025 05:20 )    Color: x / Appearance: x / SG: x / pH: x  Gluc: 86 mg/dL / Ketone: x  / Bili: x / Urobili: x   Blood: x / Protein: x / Nitrite: x   Leuk Esterase: x / RBC: x / WBC x   Sq Epi: x / Non Sq Epi: x / Bacteria: x                      Tubes/Lines:  peripheral lines       GLOBAL ISSUE/BEST PRACTICE:  Analgesia: N   Sedation: N   HOB elevation: Y  Stress ulcer prophylaxis:   VTE prophylaxis: SCDs   Glycemic control: low dose ISS   Nutrition: NPO     CODE STATUS:  FULL CODE

## 2025-06-23 NOTE — PROGRESS NOTE ADULT - ASSESSMENT
Prerenal azotemia, CKD 3  Dyspnea: Pneumonia, h/o COPD/CHF  s/p Shock   Diabetes  Aplastic anemia, requiring frequent blood transfusions  Hemoptysis    Events noted. Pt seen in ICU. PRBC transfusion. To continue current meds. Monitor blood sugar levels. Insulin coverage as needed.   Trend BP and titrate BP meds as needed. Avoid nephrotoxic meds as possible. Pulmonary follow up. Will follow electrolytes and renal function trend. ICU management.

## 2025-06-23 NOTE — PROGRESS NOTE ADULT - ASSESSMENT
REASON FOR VISIT  .. Management of problems listed below      EVENTS/CURRENT ISSUES.  . 6/23/2025 tr icu  . 6/22/2025 worsening gas exchange placed on hfnc apixa stoppd   . 6/20/2025 apixa restartd and noc bpap orderde   . 6/16/2025 fob done oozing rul and l lo lobe post basal   . 6/14/2025 iv ufh dced   . 6/13/2025 continues to have mild hemoptysis but is also on iv ufh drip   . 6/13/2025 dw pt re rbaa of bronch and se agrees scheduled for 6/16 10 in or   . 6/13/2025 dw cardio and id   . 6/11/2025 qft funfitell and galactomannan orderd   . 6/11/2025 pt wa sstarted on iv ufh for hemoptysis and pleuritic chest pain but was stopped when vq showed vlp   . 6/10/2025 Mild hemoptysis   . 6/9 tr out of icu  . 6/8/2025  . PNEUMONIA RML 6/8/2025  . SHOCK 6/8/2025   . NOREPI 6/8/2025   . STRESS STEROIDS   .... HYDROCORTISONE 6/8/2025  . A fib (chronic) POA 6/8/2025  . ANEMIA Hb 6/8/2025 Hb 7.5  . KIMBERLY ON CKD Cr 6/8/2025 Cr 1.9        REVIEW OF SYMPTOMS   Able to give ROS  Yes     RELIABILITY +/-   CONSTITUTIONAL Weakness Yes    ENDOCRINE  No heat or cold intolerance    ALLERGY No hives  Sore throat No stridor  RESP Shortness of breath YES   NEURO New weakness No   CARDIAC   Palpitations No         PHYSICAL EXAM    HEENT Unremarkable  atraumatic   RESP Fair air entry  Harsh breath sound   CARDIAC S1 S2 No S3     NO JVD    ABDOMEN No hepatosplenomegaly   PEDAL EDEMA present No calf tenderness    REASON FOR VISIT  .. Management of problems listed below      CC.   . 6/8/2025 brought in by ems for right sided chest pain that started at 3am- nonradiating- patient was offered aspirin by ems- patient refused and stated to ems that she is on plavix and was advised not to take aspirin. patient on 43 litres nasl cannula-     OVERALL PRESENTATION.  . 6/8/2025 84 yr old female with PMHx afib on eliquis, COPD (not on home O2), PE/Rt lower extremity DVT 2017, Rt LE IVC filter 2017 CKD3, aplastic anemia, polymyalgia rheumatica on prednisone 5 mg po qd, requiring PRBC transfusions ~8 weeks (via Rt subclavian mediport), AVN bilat hips, HTN, HLD, HFpEF (75% 6.2.25), diastolic dysfx grade1, recent hospitalization 5/25/25-6/5/25 for ADHF/COPD exacerbation, Pt with eventual improvement and transfer to [facility] facility from where she presents to [facility] this a.m. 6/8/25 with c/o Rt sided chest pain. general malaise. Pt stated began to feel ill evening before presentation, daughter this a.m. endorsed pt lethargic, pale.     In E.D. Pt found to have fever with tmax of 101, KIMBERLY on CKD with sCr 1.90 (baseline 1.2-1.6), HYPOtnsive with SBP 80's (pt on midodrine therapy, usual 's). In addition found to have leukocytosis of 37.6 (baseline 5.25 6/5/25), procalcitonin of 13.6, Hgb 7.5, elevated INR 2.27,  Chest xray demonstrated RML consolidations, concerning for pneum. In E.D. pt receiving 500 cc's NS, Vanco 1 gm, zosyn. Pt received tylenol 1 gm IV x1.     PMH.        BEST PRACTICE ISSUES.  . HOB ELEVATN.    .... Yes  . DIET  .   .... DASH 6/8/2025   . FREE WATER.   ....   .  IV FLUID.  .... 6/15-6/20/2025 1/2 ns 50   ..... 6/8 lr 40 x 12 h   . PHARMAC DVT PPLX .    .... 6/22 apixa dced suspect hemoptysis   .... 6/20 apixa 2.5 x 2   .... 6/12-6/14/2025 iv ufh (hospitalist)  .... 6/11/2025 [facility] 5k.2   .... 6/10/2025 4:46 PM iv ufh   .... [facility] 6/9-6/10/2025   . NON PHARMAC DVT PPLX .    .... 6/23/2025 compr device   . STRESS ULCR PPLX .   .... PROTONIX 40 6/22/2025   . DATE/DM MGMT.   ..... See under Endocrine section   GENERAL DATA .   . COVID.         .... scv2 6/8/2025 scv2 (-)   . GOC.    ....    . ICU STAY.   .... 6/22 -    .... 6/8-6/9   . INFECTION PPLX .   .... CHLORHEXIDINE 2% 6/8/2025   . ALLGY.   ....  nka  . WT.   .... 6/8/2025 69   . BMI.  ....6/8/2025 26      XXXXXXXXXXXXXXXXXXXXXXX  VITALS/GAS EXCHANGE/DRIPS    ABGS.   6/20/2025 4p 5l 736/71/65   6/22/2025 7p bpap 16/8/1 731/82/153   6/23/2025 11p bpap 12/5/.4 727/92/57  6/23/2025 2a avaps 450/18/8/25/10 .45 740/67/85   .  VS/ PO/IO/ VENT/ DRIPS.  6/23/2025 99f 103 130/70   6/23/2025 2p avaps 450 10/25 .45     XXXXXXXX[id number]  PROBLEM ASSESSMENT RECOMMENDATIONS.  RESP.   . GAS EXCHANGE .   .... target PO 90-95%     . NEED FOR HOME OXYGEN   .... 6/18/2025 will need home oxygen if at discharge pulse ox at rest or on exertion is below 88%     . COPD   .... ADVAIR 500 6/8/2025   .... MONTELUKAST 10 6/8/2025   .... SPIRIVA 6/8/2025   .... nacl 3% bid 6/22/2025     . RESP FAILURE  .... 6/8/2025 Has ho hypercapnia   .... 6/8/2025 recommend monitor abg  .... 6/18/2025 requiring 4l nc     . HYPERCAPNIC RESP FAILURE WITHOUT ACIDEMIA   6/20/2025 4p 5l 736/71/65   .... 6/20/2025 sterted noct bpap 35/15/6/16    . MILD HEMOPTYSIS 6/10/2025   .... ct ch 6/10/2025 cw 5/31  ........ new mf infection in rul   ........ unchanged small r greater than left pl effsn   ....... enlarged pulm artery suggesting pulm hytn   .... 6/10/2025  dw hemat cardio id and instead of restarting apixa will start iv UFH which can be easily reversed in case Hb starts dropping but will be the rx for venous thromboembolism as well as for a fib   .... v duplx 6/10 (-)  .... vq 6/10 vlp   .... 6/13/2025 continues to have mild hemoptysis but is also on iv ufh drip   . 6/13/2025 dw pt re rbaa of bronch and se agrees scheduled for 6/16 10 in or   .... 6/13/2025 dw cardio and id   .... 6/16 fob done showed ooze rul and l lo lobe no eb lesions   .... 6/17/2025 continues to have mild hemoptysis   .... 6/17/2025 30% of hemoptysis cases no cause is found  Ordered gbm ab rf dsdna rf apla chapman   .... 6/18/2025 cbmab dsdna inderjit apla ab all (-)   .... 6/12 fungitell galactomannan (-)   .... strep legn ag 6/9 (-)   .... fob 6/16 afb sm (-) cyto (-)  .... 6/18/2025 hemoptysis likely sec to pneumonia in setting of pulm hypertension   .... 6/20/2025 apixa restarted   .... 6/22/2025 apixa dced    . PLEURITIC CHEST PAIN RO VTE   .... v duplx 6/10 (-)  .... vq 6/10 vlp   .... 6/12/2025 iv ufh was stopped 6/11 as vq vlp but was restarted by hospitalist 6/12/2025 for af   .... chest pain resolvd       INFECTION.  . DATA  .... w 6/22-6/23/2025 w 7.2 - 7.8   .... w 6/8-6/10-6/13-6/14-6/15-6/17-6/18-6/19-6/20/2025   .... w 13.6 - 9.2 - 15.9 - 18- 14 - 9.6 - 11.9- 9.8 - 8.7   .... esr 6/8/2025 esr 17   .... w 6/8-6/9/2025 w 32 - 24   .... ua 6/8/2025 w 4   .... cxr 6/8/2025 cxr dense infiltra r upper hilum r mediport  .... ct  6/10 cw 5/31  ........ r greater than l effs   ........ partial rll atelectasis   ........ new patchy and nodular areas of consolidation rul and associated ground glass opacities   ........ ground glass and nodular opac also scott   ........ numerous tib rml and rll     . MICROBIO  .... sp 6/8 n commensals   .... flu ab 6/8/2025 (-)   .... rsv 6/8/2025 (-)    .... mrsa 6/9/2025 (-)  .... uc 6/8 (-)  .... bc 6/8 (-)     . PNEUMONIA RUL 6/8/2025   .... AZITHRO 6/8-6/11 /2025   .... ZOSYN 6/8-6/15/2025     . PNEUMONIA   .... ct ch 6/22/2025 cw 6/10/2025   ....... incr consolidation and ggo rul   ....... new large consolidatn scott and l lo lobe   .... 6/22/2025 zosyn     CARDIAC.  . PAIN CHEST   .... 6/10/2025 co pain chest r   .... 6/10/2025 check ct to see if she has pleurisy   .... 6/10/2025  dw hemat cardio id and instead of restarting apixa will start iv UFH which can be easily reversed in case Hb starts dropping but will be the rx for venous thromboembolism as well as for a fib     . STRESS STEROIDS   .... HYDROCORTISONE   ........ 6/8/2025 h 50.4  ........ 6/10/2025 h 50.2   ........ 6/12/2025 hydrocort 50   ....... 6/14/2025 h 25  ........ 6/17/2025 dced     . SHOCK   .... MIDODRINE 6/8-6/16/2025 m 10.3   .... DROXIDOPA 6/9-6/16/2025 d 100.3   .... NOREPI 6/8-6/9/2025 n 8/250   .... target map 65 (+)     . CAD    .... Trop 6/8-6/9/2025 Tr 32- 24     . LACTICEMIA   .... la 6/8/2025 la 1.4     . CHF  .... pbnp 6/8/2025 pbnp 6599   .... tte 4/2025 mild ph  n vf  .... tte 6/2/2025   ........ ef 71%   ........ n rvsf    ........ pasp 54   ....... la mod dilatd   .... lasix 40/d 6/22/2025  .... torsemide 20 6/17-6/22/2025  .... lasix 40 once 6/14/2025  .... 6/22/2025 lasix 20 once     . A fib (chronic) POA 6/8/2025  .... AMIODARONE 100 6/8/2025  .... 6/10/2025  dw hemat cardio id and instead of restarting apixa will start iv UFH which can be easily reversed in case Hb starts dropping but will be the rx for venous thromboembolism as well as for a fib   .... 6/12-6/14/2025 iv ufh     HEMAT.  . DATA  .... Plt 6/8/2025 plt 153   .... inr 6/8-6/9/2025 inr 2.27- 1.63      . ANEMIA   .... Hb 6/19-6/20-6/21-6/22-6/23/2025   .... Hb 7.8 - 9.2 - 9.3 - 8.3 - 9.2   .... Hb 6/8-6/9-6/10-6/11-6/13-6/14-6/15-6/17-6/18/2025   .... Hb 7.5- 7.8 - 7.6- 7.1 - 8.4 - 7.3- 9.1 - 8.1 - 8.5    . TRANSFUSION  .... 6/8  1 u prbc    .... 6/14 2 u prbc   .... 6/22/2025 1 u prbc     GI.   . DIET .   .... dash 6/8/2025     . LFT MONITORING   .... LFTS    6/8/2025  ........ AP     39  ........ AST   11  ........ ALT    35    RENAL.  . DATA  .... Na 6/8/2025 Na 137  .... K 6/8/2025 K 4   .... CO2 6/8/2025 co2 29   .... ag 6/8/2025 ag 9  .... alb 6/8/2025 alb 3.4     . KIMBERLY ON CKD   .... Cr 6/8-6/9-6/10-6/11-6/13-6/14/2025   .... Cr 1.9 - 1.4 - 1.3 - 1.3 - 1.1 - 1  .... Cr 5/27-5/28-5/29-5/30-6/1/2025   .... Cr 1.2 - 1.3 - 1.3 - 1.6 - 1.6    ENDO.  . DM  .  .... 6/8/2025 SL SCALE     NEURO.  . PAIN  .... GABAPENTIN 6/8/2025 g 400.2       [id number]   SUMMARY BASELINE .     84 yr old female pt of Dr Gallegos not on home ox or home cpap used to use trelegy lives with spouse quit 40 y 1/2 ppd with PMHx afib on eliquis, COPD (not on home O2), PE/Rt lower extremity DVT 2017, Rt LE IVC filter 2017 CKD3, aplastic anemia, polymyalgia rheumatica on prednisone 5 mg po qd, requiring PRBC transfusions around 8 weeks (via Rt subclavian mediport), AVN bilat hips, HTN, HLD, HFpEF (75% 6.2.25), diastolic dysfx grade1, recent hospitalization 5/25/25-6/5/25 for ADHF/COPD exacerbation, Pt with eventual improvement and transfer to [facility] facility   CC.   . 6/8/2025 R CHEST PAIN   MAIN ISSUES.  . COPD 6/23/2025 hydrocort 50.3   . HYPERCAPNIC RESP FAILURE: 6/20/2025 4p 5l 736/71/65   .... 6/20/2025 Noct bpap 15/5/35/16 ordrd   . RESP FAILURE 6/22/2025  HFNC 6/22 5p HFNC 80% 50l po 95% tr icu   . MILD HEMOPTYSOIS 6/10/2025 6/22 apixa stoppd   . SUSPECTED BLEEDING IN [place] CAUSED RESP DECOMPENSATION AND NEW OPACITIES ON 6/22 CT  6/22 622 apixa stoppd   . PNEUMONIA RML 6/8/2025:  ZOSYN 6/8-6/16/2025   . PNEUMONIA: 6/22 ct bl ul opac 6/22/2025 zosyn   . PLEURITIS CHEST PAIN 6/10/2025: 6/10 vte excluded vlp vq   . SHOCK 6/8/2025: tr oo icu 6/9   . NOREPI 6/8-6/9/2025   . STRESS STEROIDS : HYDROCORTISONE 6/8-6/17/2025  . A fib (chronic) POA 6/8/20256/22/2025 6/22 apixa stopped   . CHF: tte 6/2/2025  ef 71%  pasp 54  la mod dilatd 6/22 lasix 40   . ANEMIA Hb 6/8-6/13/2025 Hb 7.5- 8.4  . TRANSFUSION 6/8  1 u prbc  6/22 1 u prbc   . KIMBERLY ON CKD Cr 6/8-6/13/2025 Cr 1.9- 1.1  PMH.   . AFon Eliquis,   . COPD (not on home o2),   . CKD,   . aplastic anemia,   . TRANSFUSION 6/2/2025 1 u prbc   . PMR (chronic prednisone with AVN hip),   . HLD,   . HTN,   . DM    PROCEDURES/DEVICES .  . 6/16/2025 FOB br wash rul ooze rul l lo lobe post basal     PAST PROCEDURES   . r mediport poa 6/8/2025     DISCUSSIONS.  .... Discussed with primary care and relevant consultants on an ongoing basis       TIME SPENT.  . Over 36 minutes aggregate care time spent on encounter; activities included   direct patient care, counseling and/or coordinating care reviewing notes, lab data/ imaging , discussion with multidisciplinary team/ patient  /family and explaining in detail risks, benefits, alternatives  of the recommendations     ROBERT HENRIQUEZ 84 6/8/2025 1942

## 2025-06-23 NOTE — CONSULT NOTE ADULT - SUBJECTIVE AND OBJECTIVE BOX
no Consulting Physician: Afshin  Case discussed with Physician/ACP: Joanne  [facility]  located at [facility] Program: [address] [place] [address] for the visit    Visit performed via Telehealth which precludes Physical examination, visual evaluation performed. Physical examination as per bedside clinical team (Physician, ACP, and/or Resident)- pertinent examination findings discussed in detail; all vitals signs, lab, radiographic, and patient documents reviewed.     [place]ina has been admitted since 6/8 with a hospital which included being treated for septic shock respiratory concerns and volume overload, eventually being transition to a non-ICU setting. Extensive PMH, notable for polymyalgia rheumatica on chronic steroids, copd, chf, atrial fibrillation, and aplastic anemia. Had been seen for persistent hypoxia and hypercapnia, was placed on HFO2.     It would seem during the recent RRT, there was worsening acute hypercapnia with pH 7.31/82--> 7.27/92 while on NIPPV--> changed to AVAPS. Also given nebs, steroids, and antibiotics.             CRITICAL CARE TIME SPENT: 46 n/a

## 2025-06-23 NOTE — PROGRESS NOTE ADULT - ASSESSMENT
84 female PMH of A-fib on Eliquis, COPD, CKD, aplastic anemia, polymyalgia rheumatica, on chronic steroids, avascular necrosis of hips, HLD, HTN, DM, recent admission to [facility] 5/26 through 6/5/2025 for CHF/fluid overload/COPD exacerbation, presents to the ED form [facility] for evaluation of fever and generalized feeling of being unwell, found to be septic and hypotensive.     Septic shock likely 2/2 to PNA and KIMBERLY.  - CT chest 6/10 with multifocal R sided PNA  - Pulm following.    -hx copd on home o2 , baseline o2 keep > 88%   - s/p bronch, 6/16.  Showed blood oozing from RUL but no lesion.  Still with minimal hemoptysis  -fu ID   - on 6/22 had AMS from CO2 narcosis.  Now sedated and on BIPAP.  Follow ABG    - c/o atypical CP Appears pleuritic  - EKG x 2 showed NSR with PAC, non-ischemic.  Troponin negative     - Continue IV Lasix to maintain neg balance  - Echo 6/12/25 as above EF 67% mild LVH, mod MAC, mild MR, mod to severe pHTN    - Bp remains stable    - Now off Midodrine and Northera    - Hx of paroxysmal atrial fibrillation and DVT/PE  - restarted on ac with eliquis at 2.5 bid, but now off  - resume when able  - Continue Amiodarone 100 mg daily    - Monitor and replete electrolytes. Keep K>4.0 and Mg>2.0.  - Will continue to follow.  Patient is at risk for abrupt decompensation

## 2025-06-23 NOTE — PROGRESS NOTE ADULT - ASSESSMENT
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   Recently DC to [facility]    Readmitted with pneumonia, sepsis to MICU.   Transferred to medical unit 06/10/25  Hgb 9.1 today    Hgb declined during last admission s/p transfusion multiple times  1U PRBC 06/23  1U PRBC 06/19  2U PRBC 06/14  1U PRBC 06/11  1U PRBC 06/08  1U PRBC 06/02    RECOMMENDATIONS    Anemia  follow CBC and transfuse as indicated  Eliquis on hold per cardiology    Afib and hemoptysis  heparin has been discontinued  continue present cardiac management  follow INR    DVT hx  has IVC filter  no PE this admission    Multifocal PNA and hemoptysis  s/p bronchoscopy Tues     d/w Dr Fernando

## 2025-06-23 NOTE — PROGRESS NOTE ADULT - PROBLEM SELECTOR PLAN 8
-chronic recent hospitalization 5/25/25-6/5/25 for AHDF/COPD exacerbation  -Bronchodilators q 6 hrs prn for sob/wheezes  -c/w home med of Advair 500/50 q 12 hrs  -c/w home med tiotropium   -Supplemental O2 prn to maintain SPO2 > 92% -- taper o2   pt s/p acute hypercarbic  hypoxic respiratory failure / s/p bipap, ABG respiratory acidosis improving

## 2025-06-23 NOTE — PROGRESS NOTE ADULT - ASSESSMENT
84 yr old female with PMHx afib on eliquis, COPD (not on home O2), PE/Rt lower extremity DVT 2017, Rt LE IVC filter 2017 CKD3, aplastic anemia, polymyalgia rheumatica on prednisone 5 mg po qd, requiring PRBC transfusions ~8 weeks (via Rt subclavian mediport), AVN bilat hips, HTN, HLD, HFpEF (75% 6.2.25), diastolic dysfx grade1, recent hospitalization 5/25/25-6/5/25 for AHDF/COPD exacerbation, pt also with hx of IVC filter, had Rt subclavian mediport, who was admitted on 6/8 with c/o Rt sided chest pain. general malaise. Pt stated began to feel ill evening before presentation, daughter this a.m. endorsed pt lethargic, pale. Patient was initially admitted in the ICU for hypotension requiring pressors. She is transferred out of the ICU. She still reports some R sided chest pain and SOB. Reports cough which started when she arrived in the hospital. Has some blood tinged sputum.    ID consulted for pneumonia. CT chest showed new multifocal infection predominantly involving the right upper lobe. Suspect respiratory symptoms multifactorial in the setting of CHF, anemia. Blood cultures remain no growth. COVID/FLU/RSV negative. MRSA nasal PCR, urine strep and legionella antigen negative. Respiratory culture grew commensal marilee consistent with body site. Serum Fungitell and galactomannan low. Quantiferon indeterminate, but likely due to underlying immunosuppression.    S/p bronchoscopy on 6/16. Had oozing blood from RUL but no endobronchial lesions noted. BAL AFB gram stain negative, and BAL culture grew commensal marilee consistent with body site. Completed 10 days of Zosyn.     On 6/22, noted to have AMS with CO2 retention suspected. Transferred to ICU and on BIPAP. Suspect respiratory symptoms are multifactorial, but also started on Zosyn empirically. Repeat CT chest showed increased consolidation and ground-glass opacity within right upper lobe, new large consolidations within left upper lobe and left lower lobe, No fever and leukocytosis resolved.    #R sided pneumonia  #Hemoptysis  #Acute hypoxic respiratory failure    -continue Zosyn  -MRSA nasal PCR pending  -suggest repeat cultures if febrile  -follow cultures to completion  -discussed with Dr. Abdullahi Velazco MD  Division of Infectious Diseases   Cell [phone] between 8am and 6pm   After 6pm and weekends please call ID service at [phone].     35 minutes spent on total encounter assessing patient, examination, chart review, counseling and coordinating care by the attending physician/nurse/care manager.

## 2025-06-23 NOTE — PROGRESS NOTE ADULT - ATTENDING COMMENTS
84F with HTN, HLD, COPD, HFpEF, paroxysmal afib on eliquis, DVT/PE s/p IVC filter, COPD, CKD, aplastic anemia requiring pRBC transfusion s/p chest wall port, polymyalgia rheumatica on steroids, AVN of hips, who initially presents with sepsis 2/2 PNA requiring ICU stay, pt has since been transferred to the floor. Course complicated by hemoptysis after reinitiation of anticoagulation, and a/c now discontinued. Overnight pt more obtunded with concern for acute on chronic hypercapnic respiratory failure 2/2 NIV nonadherence. Pt transferred to ICU for NIV with precedex, now with improvement in mental status.    Neuro: Metabolic encephalopathy 2/2 hypercapnic respiratory failure, improving s/p AVAPs. Continue to monitor mental status. D/c precedex. Continue home gabapentin.  Pulm: Acute on chronic hypercapnic and hypoxic respiratory failure requiring AVAPs, would continue qHS and prn. Pt with hemoptysis earlier in the week, now resolved. CT chest yesterday with worsening b/l infiltrates concern for PNA vs hemorrhage though no hemoptysis noted. Continue duonebs and advair  CV: Currently HD stable. Continue amiodarone for afib but hold a/c given hemoptysis and anemia. Continue stress dose steroids.   Skin/Lines: chest wall port  GI: Regular diet-- NPO except meds when on AVAPs  /Renal: trend I&Os. Continue lasix 40mg daily  Heme/DVT PPX: aplastic anemia requiring frequent transfusion, Hb 7.2  today will transfuse 1U pRBC today.   Endo:  ID:  Ethics: 84F with HTN, HLD, COPD, HFpEF, paroxysmal afib on eliquis, DVT/PE s/p IVC filter, COPD, CKD, aplastic anemia requiring pRBC transfusion s/p chest wall port, polymyalgia rheumatica on steroids, AVN of hips, who initially presents with sepsis 2/2 PNA requiring ICU stay, pt has since been transferred to the floor. Course complicated by hemoptysis after reinitiation of anticoagulation, and a/c now discontinued. Overnight pt more obtunded with concern for acute on chronic hypercapnic respiratory failure 2/2 NIV nonadherence. Pt transferred to ICU for NIV with precedex, now with improvement in mental status.    Neuro: Metabolic encephalopathy 2/2 hypercapnic respiratory failure, improving s/p AVAPs. Continue to monitor mental status. D/c precedex. Continue home gabapentin.  Pulm: Acute on chronic hypercapnic and hypoxic respiratory failure requiring AVAPs, would continue qHS and prn. Pt with hemoptysis earlier in the week, now resolved. CT chest yesterday with worsening b/l infiltrates concern for PNA vs hemorrhage though no hemoptysis noted. Continue duonebs and advair  CV: Currently HD stable. Continue amiodarone for afib but hold a/c given hemoptysis and anemia. Continue stress dose steroids.   Skin/Lines: chest wall port  GI: Regular diet-- NPO except meds when on AVAPs  /Renal: trend I&Os. Continue lasix 40mg daily  Heme/DVT PPX: aplastic anemia requiring frequent transfusion, Hb 7.2  today will transfuse 1U pRBC today. Monitor off of eliquis given hemoptysis. Trend CBC. SCDs for DVT ppx   Endo: On stress dose steroids; trend FS. ISS.  ID: On zosyn for presumed PNA, follow up sputum cx and MRSA/MSSA swab  Ethics: full code, plan d/w daughter at bedside

## 2025-06-23 NOTE — PROGRESS NOTE ADULT - ASSESSMENT
84 female PMH of A-fib on Eliquis, COPD, CKD, aplastic anemia, polymyalgia rheumatica, on chronic steroids, avascular necrosis of hips, HLD, HTN, DM, recent admission to [facility] 5/26 through 6/5/2025 for CHF/fluid overload/COPD exacerbation, presents to the ED form [facility] for evaluation of fever and generalized feeling of being unwell, found to be septic and hypotensive on 6/8 admission, admitted for ICU briefly for vasopressors support and placed on floor. MICU reconsulted on 6/22 for hypoxemia/hypercapnia during the day at that time pt appeared well on HFNC 30%fio2 therefore pt stayed on GMF. RRT tonight for AMS concern for hypercapnic respiratory failure      Neuro:   -mentation improved, off of precedex     CV:   -hx of CHF on lasix and amiodarone at home   -continue lasix 40mg qd     Resp:   -Acute on chronic hypercapnic respiratory failure with CHF exacerbation and superimposed pneumonia.   -pt doing well this AM, can monitor off Bipap during the day. Will need Bipap at night   -stress steroids solucortef 50 q8h  -ABG overnight   -CT chest yesterday with increased GGO with very large dense left sided infiltrate but multifocal pneumonia (compared to previous 6/10 significantly worse).   -continue bronchodilators. Head of Bed>30 for aspiration prevention.  -pt with episodes of hemoptysis, now resolved. Pulm and Heme following     Diet:  -NPO for now     Renal:   -Cr stable     ID:   -continue Zosyn for PNA  -monitor for fevers and WBC     Heme:   -Pt has aplastic anemia, receiving PRBC transfusions ~8 weeks (via Rt subclavian mediport)   -Pt's baseline appears to be around 8? Hgb has been downtrending 9.3->8.3->7.9. Will give 1 unit RBC now   -No signs of yemi bleeding, off full AC now.

## 2025-06-23 NOTE — PROVIDER CONTACT NOTE (EICU) - SITUATION
d/w ACP Joanne- ABG noted 7.4/67/85 HCO3 42; compensated respiratory acidosis with chronic retention. would keep current plan. video assessment of patient noted on AVAPS, Vt 450 with natural trigger of resp rate.     additional time on ventilator management 15 minutes. (total critical care time 61 minutes).

## 2025-06-23 NOTE — PROGRESS NOTE ADULT - PROBLEM SELECTOR PLAN 9
chronic rate controlled   monitor sputum production ( blood tinged today )   c/w amiodarone 100mg qd.  - 6/12 heparin gtt started  - 6/13 : heparin gtt now discontinued 2/2 hemoptysis  - restarted Eliquis on 06/20, dc'd on 06/22 for possible bleed-- unlikely, but will resume when less acutely ill

## 2025-06-23 NOTE — PROGRESS NOTE ADULT - PROBLEM SELECTOR PLAN 5
-likely in the setting of sepsis   -on home full AC with Eliquis  - 6/12 heparin gtt started  - 6/13 : heparin gtt now discontinued 2/2 hemoptysis  - restarted Eliquis on 06/20, dc'd on 06/22 for possible bleed-- unlikely, but will resume when less acutely ill

## 2025-06-23 NOTE — CHART NOTE - NSCHARTNOTEFT_GEN_A_CORE
Assessment: Pt seen for nutrition follow-up. Chart reviewed, hospital course noted.    Brief hx: Pt is a "84 female PMH of A-fib on Eliquis, COPD, CKD, aplastic anemia, polymyalgia rheumatica, on chronic steroids, avascular necrosis of hips, HLD, HTN, DM, recent admission to [facility] 5/26 through 6/5/2025 for CHF/fluid overload/COPD exacerbation, presents to the ED form [facility] for evaluation of fever and generalized feeling of being unwell, found to be septic and hypotensive on 6/8 admission, admitted for ICU briefly for vasopressors support and placed on floor. MICU reconsulted on 6/22 for hypoxemia/hypercapnia during the day at that time pt appeared well on HFNC 30%fio2 therefore pt stayed on GMF. RRT tonight for AMS concern for hypercapnic respiratory failure."    Visited pt at bedside this am. Pt resting during visit. Did not disturb at this time. Spoke with RN; pt off bipap this am. Off precedex. Remains NPO this am. Diet now advanced this afternoon to DASH/TLC diet. PO intakes variable; % of meals per nursing documentation. No report N/V. +BM 6/22. Swallow evaluation completed 6/17; SLP recommended Regular Solids and Thin Liquids as tolerated. Continue current diet as tolerated. Assistance/encouragement at meal times as needed. RD remains available and will continue to follow-up.     Factors impacting intake: [ ] none [ ] nausea  [ ] vomiting [ ] diarrhea [ ] constipation  [ ]chewing problems [ ] swallowing issues  [ ] other:     Diet Prescription: Diet, Regular:   DASH/TLC {Sodium & Cholesterol Restricted} (06-23-25 @ 13:52)    Intake: NPO this am, diet now advanced this afternoon    Current Weight: 6/8 152.1#, 6/19 179.4#, 6/23 159.1# (2+ generalized edema noted)  dry wt 142#    Pertinent Medications: MEDICATIONS  (STANDING):  albuterol/ipratropium for Nebulization 3 milliLiter(s) Nebulizer every 6 hours  aMIOdarone    Tablet 100 milliGRAM(s) Oral daily  chlorhexidine 2% Cloths 1 Application(s) Topical <User Schedule>  dexMEDEtomidine Infusion 0.2 MICROgram(s)/kG/Hr (3.6 mL/Hr) IV Continuous <Continuous>  dextrose 5%. 1000 milliLiter(s) (50 mL/Hr) IV Continuous <Continuous>  dextrose 5%. 1000 milliLiter(s) (100 mL/Hr) IV Continuous <Continuous>  dextrose 50% Injectable 25 Gram(s) IV Push once  dextrose 50% Injectable 12.5 Gram(s) IV Push once  dextrose 50% Injectable 25 Gram(s) IV Push once  fluticasone propionate/ salmeterol 500-50 MICROgram(s) Diskus 1 Dose(s) Inhalation two times a day  furosemide   Injectable 40 milliGRAM(s) IV Push daily  gabapentin 400 milliGRAM(s) Oral every 12 hours  glucagon  Injectable 1 milliGRAM(s) IntraMuscular once  hydrocortisone sodium succinate Injectable 50 milliGRAM(s) IV Push every 8 hours  insulin lispro (ADMELOG) corrective regimen sliding scale   SubCutaneous every 6 hours  pantoprazole  Injectable 40 milliGRAM(s) IV Push daily  piperacillin/tazobactam IVPB.. 3.375 Gram(s) IV Intermittent every 8 hours  sodium chloride 3%  Inhalation 4 milliLiter(s) Inhalation every 6 hours    MEDICATIONS  (PRN):  acetaminophen     Tablet .. 650 milliGRAM(s) Oral every 6 hours PRN Mild Pain (1 - 3)  dextrose Oral Gel 15 Gram(s) Oral once PRN Blood Glucose LESS THAN 70 milliGRAM(s)/deciliter    Pertinent Labs: 06-23 Na145 mmol/L Glu 86 mg/dL K+ 4.2 mmol/L Cr  1.00 mg/dL BUN 36 mg/dL[H] 06-23 Phos 3.3 mg/dL 06-23 Alb 2.7 g/dL[L]    CAPILLARY BLOOD GLUCOSE  POCT Blood Glucose.: 129 mg/dL (23 Jun 2025 11:04)  POCT Blood Glucose.: 155 mg/dL (23 Jun 2025 00:24)  POCT Blood Glucose.: 177 mg/dL (22 Jun 2025 19:26)  POCT Blood Glucose.: 154 mg/dL (22 Jun 2025 16:36)    Skin: stage I to sacrum, L hip, L heel    Estimated Needs:   [X] no change since previous assessment: based on dry wt 142#/64.4kg  25-30kcal/kg (1610-1932kcal)  1-1.2g pro/kg (64-77gm protein)  [ ] recalculated:     Previous Nutrition Diagnosis:   [ ] Inadequate Energy Intake [ ]Inadequate Oral Intake [ ] Excessive Energy Intake   [ ] Underweight [ ] Increased Nutrient Needs [ ] Overweight/Obesity   [ ] Altered GI Function [ ] Unintended Weight Loss [ ] Food & Nutrition Related Knowledge Deficit [ ] Malnutrition  [X] No active Nutrition Diagnosis    Nutrition Diagnosis is [ ] ongoing  [ ] resolved [ ] not applicable     New Nutrition Diagnosis: [ ] not applicable [X] Inadequate energy intake: related to acute illness; respiratory failure requiring bipap, AMS as evidenced by NPO status x 1 day while requiring bipap, poor po intake x 2 days (6/20-6/22).     Interventions:   Recommend  [ ] Change Diet To:  [ ] Nutrition Supplement  [ ] Nutrition Support  [X] Other: Continue current diet as tolerated; honor food preferences as able to optimize po intake/tolerance  Recommend MVI daily    Monitoring and Evaluation:   [ ] PO intake [ x ] Tolerance to diet prescription [ x ] weights [ x ] labs[ x ] follow up per protocol  [X] other: s/s GI distress, bowel function, skin integrity/ edema

## 2025-06-23 NOTE — PROGRESS NOTE ADULT - PROBLEM SELECTOR PLAN 10
on home prednisone 5mg daily  and leflunomide (10mg) if restarted Patient will need to bring in home medication to bring to pharmacy to dispense.  -resumed high dose steroids again

## 2025-06-23 NOTE — CHART NOTE - NSCHARTNOTEFT_GEN_A_CORE
: Suzette Fernando    INDICATION: Respiratory failure    PROCEDURE:  [ ] LIMITED ECHO  [x] LIMITED CHEST  [ ] LIMITED RETROPERITONEAL  [ ] LIMITED ABDOMINAL  [ ] LIMITED DVT  [ ] NEEDLE GUIDANCE VASCULAR  [ ] NEEDLE GUIDANCE THORACENTESIS  [ ] NEEDLE GUIDANCE PARACENTESIS  [ ] NEEDLE GUIDANCE PERICARDIOCENTESIS  [ ] OTHER    FINDINGS: scattered apical b lines b/l with bibasilar consolidations.       INTERPRETATION: pneumonia vs atelectasis

## 2025-06-24 LAB
ALBUMIN SERPL ELPH-MCNC: 2.6 G/DL — LOW (ref 3.3–5)
ALP SERPL-CCNC: 53 U/L — SIGNIFICANT CHANGE UP (ref 40–120)
ALT FLD-CCNC: 15 U/L — SIGNIFICANT CHANGE UP (ref 12–78)
ANION GAP SERPL CALC-SCNC: 3 MMOL/L — LOW (ref 5–17)
APTT BLD: 32.7 SEC — SIGNIFICANT CHANGE UP (ref 26.1–36.8)
AST SERPL-CCNC: 6 U/L — LOW (ref 15–37)
BASOPHILS # BLD AUTO: 0 K/UL — SIGNIFICANT CHANGE UP (ref 0–0.2)
BASOPHILS NFR BLD AUTO: 0 % — SIGNIFICANT CHANGE UP (ref 0–2)
BILIRUB SERPL-MCNC: 0.7 MG/DL — SIGNIFICANT CHANGE UP (ref 0.2–1.2)
BUN SERPL-MCNC: 34 MG/DL — HIGH (ref 7–23)
CALCIUM SERPL-MCNC: 9.6 MG/DL — SIGNIFICANT CHANGE UP (ref 8.5–10.1)
CHLORIDE SERPL-SCNC: 104 MMOL/L — SIGNIFICANT CHANGE UP (ref 96–108)
CO2 SERPL-SCNC: 40 MMOL/L — HIGH (ref 22–31)
CREAT SERPL-MCNC: 0.95 MG/DL — SIGNIFICANT CHANGE UP (ref 0.5–1.3)
CULTURE RESULTS: SIGNIFICANT CHANGE UP
EGFR: 60 ML/MIN/1.73M2 — SIGNIFICANT CHANGE UP
EGFR: 60 ML/MIN/1.73M2 — SIGNIFICANT CHANGE UP
EOSINOPHIL # BLD AUTO: 0 K/UL — SIGNIFICANT CHANGE UP (ref 0–0.5)
EOSINOPHIL NFR BLD AUTO: 0 % — SIGNIFICANT CHANGE UP (ref 0–6)
GLUCOSE BLDC GLUCOMTR-MCNC: 142 MG/DL — HIGH (ref 70–99)
GLUCOSE BLDC GLUCOMTR-MCNC: 148 MG/DL — HIGH (ref 70–99)
GLUCOSE BLDC GLUCOMTR-MCNC: 90 MG/DL — SIGNIFICANT CHANGE UP (ref 70–99)
GLUCOSE SERPL-MCNC: 120 MG/DL — HIGH (ref 70–99)
HCT VFR BLD CALC: 26.9 % — LOW (ref 34.5–45)
HGB BLD-MCNC: 8.4 G/DL — LOW (ref 11.5–15.5)
IMM GRANULOCYTES # BLD AUTO: 0.03 K/UL — SIGNIFICANT CHANGE UP (ref 0–0.07)
IMM GRANULOCYTES NFR BLD AUTO: 0.6 % — SIGNIFICANT CHANGE UP (ref 0–0.9)
INR BLD: 1.26 RATIO — HIGH (ref 0.85–1.16)
LYMPHOCYTES # BLD AUTO: 0.35 K/UL — LOW (ref 1–3.3)
LYMPHOCYTES NFR BLD AUTO: 7.6 % — LOW (ref 13–44)
MAGNESIUM SERPL-MCNC: 2 MG/DL — SIGNIFICANT CHANGE UP (ref 1.6–2.6)
MCHC RBC-ENTMCNC: 30 PG — SIGNIFICANT CHANGE UP (ref 27–34)
MCHC RBC-ENTMCNC: 31.2 G/DL — LOW (ref 32–36)
MCV RBC AUTO: 96.1 FL — SIGNIFICANT CHANGE UP (ref 80–100)
MONOCYTES # BLD AUTO: 0.97 K/UL — HIGH (ref 0–0.9)
MONOCYTES NFR BLD AUTO: 21 % — HIGH (ref 2–14)
NEUTROPHILS # BLD AUTO: 3.27 K/UL — SIGNIFICANT CHANGE UP (ref 1.8–7.4)
NEUTROPHILS NFR BLD AUTO: 70.8 % — SIGNIFICANT CHANGE UP (ref 43–77)
NRBC # BLD AUTO: 0 K/UL — SIGNIFICANT CHANGE UP (ref 0–0)
NRBC # FLD: 0 K/UL — SIGNIFICANT CHANGE UP (ref 0–0)
NRBC BLD AUTO-RTO: 0 /100 WBCS — SIGNIFICANT CHANGE UP (ref 0–0)
PHOSPHATE SERPL-MCNC: 2.9 MG/DL — SIGNIFICANT CHANGE UP (ref 2.5–4.5)
PLATELET # BLD AUTO: 197 K/UL — SIGNIFICANT CHANGE UP (ref 150–400)
PMV BLD: 11.6 FL — SIGNIFICANT CHANGE UP (ref 7–13)
POTASSIUM SERPL-MCNC: 3.8 MMOL/L — SIGNIFICANT CHANGE UP (ref 3.5–5.3)
POTASSIUM SERPL-SCNC: 3.8 MMOL/L — SIGNIFICANT CHANGE UP (ref 3.5–5.3)
PROT SERPL-MCNC: 5.6 G/DL — LOW (ref 6–8.3)
PROTHROM AB SERPL-ACNC: 14.7 SEC — HIGH (ref 9.9–13.4)
RBC # BLD: 2.8 M/UL — LOW (ref 3.8–5.2)
RBC # FLD: 18 % — HIGH (ref 10.3–14.5)
SODIUM SERPL-SCNC: 147 MMOL/L — HIGH (ref 135–145)
SPECIMEN SOURCE: SIGNIFICANT CHANGE UP
WBC # BLD: 4.62 K/UL — SIGNIFICANT CHANGE UP (ref 3.8–10.5)
WBC # FLD AUTO: 4.62 K/UL — SIGNIFICANT CHANGE UP (ref 3.8–10.5)

## 2025-06-24 PROCEDURE — 83516 IMMUNOASSAY NONANTIBODY: CPT

## 2025-06-24 PROCEDURE — 87252 VIRUS INOCULATION TISSUE: CPT

## 2025-06-24 PROCEDURE — 87641 MR-STAPH DNA AMP PROBE: CPT

## 2025-06-24 PROCEDURE — 84100 ASSAY OF PHOSPHORUS: CPT

## 2025-06-24 PROCEDURE — 87640 STAPH A DNA AMP PROBE: CPT

## 2025-06-24 PROCEDURE — 87305 ASPERGILLUS AG IA: CPT

## 2025-06-24 PROCEDURE — 85730 THROMBOPLASTIN TIME PARTIAL: CPT

## 2025-06-24 PROCEDURE — 36430 TRANSFUSION BLD/BLD COMPNT: CPT

## 2025-06-24 PROCEDURE — 88112 CYTOPATH CELL ENHANCE TECH: CPT

## 2025-06-24 PROCEDURE — 83880 ASSAY OF NATRIURETIC PEPTIDE: CPT

## 2025-06-24 PROCEDURE — 99232 SBSQ HOSP IP/OBS MODERATE 35: CPT

## 2025-06-24 PROCEDURE — 86901 BLOOD TYPING SEROLOGIC RH(D): CPT

## 2025-06-24 PROCEDURE — A9540: CPT

## 2025-06-24 PROCEDURE — P9040: CPT

## 2025-06-24 PROCEDURE — 93970 EXTREMITY STUDY: CPT

## 2025-06-24 PROCEDURE — 88305 TISSUE EXAM BY PATHOLOGIST: CPT

## 2025-06-24 PROCEDURE — G0545: CPT

## 2025-06-24 PROCEDURE — 97116 GAIT TRAINING THERAPY: CPT

## 2025-06-24 PROCEDURE — 87449 NOS EACH ORGANISM AG IA: CPT

## 2025-06-24 PROCEDURE — 94760 N-INVAS EAR/PLS OXIMETRY 1: CPT

## 2025-06-24 PROCEDURE — 83735 ASSAY OF MAGNESIUM: CPT

## 2025-06-24 PROCEDURE — 71250 CT THORAX DX C-: CPT

## 2025-06-24 PROCEDURE — 92610 EVALUATE SWALLOWING FUNCTION: CPT

## 2025-06-24 PROCEDURE — 99233 SBSQ HOSP IP/OBS HIGH 50: CPT | Mod: GC

## 2025-06-24 PROCEDURE — 78582 LUNG VENTILAT&PERFUS IMAGING: CPT

## 2025-06-24 PROCEDURE — 97110 THERAPEUTIC EXERCISES: CPT

## 2025-06-24 PROCEDURE — 86480 TB TEST CELL IMMUN MEASURE: CPT

## 2025-06-24 PROCEDURE — 85014 HEMATOCRIT: CPT

## 2025-06-24 PROCEDURE — 93005 ELECTROCARDIOGRAM TRACING: CPT

## 2025-06-24 PROCEDURE — 80053 COMPREHEN METABOLIC PANEL: CPT

## 2025-06-24 PROCEDURE — 85652 RBC SED RATE AUTOMATED: CPT

## 2025-06-24 PROCEDURE — 87102 FUNGUS ISOLATION CULTURE: CPT

## 2025-06-24 PROCEDURE — 87086 URINE CULTURE/COLONY COUNT: CPT

## 2025-06-24 PROCEDURE — 86146 BETA-2 GLYCOPROTEIN ANTIBODY: CPT

## 2025-06-24 PROCEDURE — 86038 ANTINUCLEAR ANTIBODIES: CPT

## 2025-06-24 PROCEDURE — 87798 DETECT AGENT NOS DNA AMP: CPT

## 2025-06-24 PROCEDURE — 86147 CARDIOLIPIN ANTIBODY EA IG: CPT

## 2025-06-24 PROCEDURE — 94640 AIRWAY INHALATION TREATMENT: CPT

## 2025-06-24 PROCEDURE — 36600 WITHDRAWAL OF ARTERIAL BLOOD: CPT

## 2025-06-24 PROCEDURE — 86431 RHEUMATOID FACTOR QUANT: CPT

## 2025-06-24 PROCEDURE — 85027 COMPLETE CBC AUTOMATED: CPT

## 2025-06-24 PROCEDURE — 85025 COMPLETE CBC W/AUTO DIFF WBC: CPT

## 2025-06-24 PROCEDURE — 99291 CRITICAL CARE FIRST HOUR: CPT

## 2025-06-24 PROCEDURE — 84145 PROCALCITONIN (PCT): CPT

## 2025-06-24 PROCEDURE — 81001 URINALYSIS AUTO W/SCOPE: CPT

## 2025-06-24 PROCEDURE — 83036 HEMOGLOBIN GLYCOSYLATED A1C: CPT

## 2025-06-24 PROCEDURE — 84484 ASSAY OF TROPONIN QUANT: CPT

## 2025-06-24 PROCEDURE — 85018 HEMOGLOBIN: CPT

## 2025-06-24 PROCEDURE — 86140 C-REACTIVE PROTEIN: CPT

## 2025-06-24 PROCEDURE — 87451 POLYVALENT MULT ORG EA AG IA: CPT

## 2025-06-24 PROCEDURE — 0241U: CPT

## 2025-06-24 PROCEDURE — 36415 COLL VENOUS BLD VENIPUNCTURE: CPT

## 2025-06-24 PROCEDURE — 86036 ANCA SCREEN EACH ANTIBODY: CPT

## 2025-06-24 PROCEDURE — 82962 GLUCOSE BLOOD TEST: CPT

## 2025-06-24 PROCEDURE — 87581 M.PNEUMON DNA AMP PROBE: CPT

## 2025-06-24 PROCEDURE — 87205 SMEAR GRAM STAIN: CPT

## 2025-06-24 PROCEDURE — 87081 CULTURE SCREEN ONLY: CPT

## 2025-06-24 PROCEDURE — 97162 PT EVAL MOD COMPLEX 30 MIN: CPT

## 2025-06-24 PROCEDURE — A9567: CPT

## 2025-06-24 PROCEDURE — 94660 CPAP INITIATION&MGMT: CPT

## 2025-06-24 PROCEDURE — 86850 RBC ANTIBODY SCREEN: CPT

## 2025-06-24 PROCEDURE — 82550 ASSAY OF CK (CPK): CPT

## 2025-06-24 PROCEDURE — 83605 ASSAY OF LACTIC ACID: CPT

## 2025-06-24 PROCEDURE — 86923 COMPATIBILITY TEST ELECTRIC: CPT

## 2025-06-24 PROCEDURE — 87040 BLOOD CULTURE FOR BACTERIA: CPT

## 2025-06-24 PROCEDURE — 93306 TTE W/DOPPLER COMPLETE: CPT

## 2025-06-24 PROCEDURE — 87594 PNEUMCYSTS JIROVECII AMP PRB: CPT

## 2025-06-24 PROCEDURE — 97530 THERAPEUTIC ACTIVITIES: CPT

## 2025-06-24 PROCEDURE — 82803 BLOOD GASES ANY COMBINATION: CPT

## 2025-06-24 PROCEDURE — 86225 DNA ANTIBODY NATIVE: CPT

## 2025-06-24 PROCEDURE — 87116 MYCOBACTERIA CULTURE: CPT

## 2025-06-24 PROCEDURE — 82553 CREATINE MB FRACTION: CPT

## 2025-06-24 PROCEDURE — 80048 BASIC METABOLIC PNL TOTAL CA: CPT

## 2025-06-24 PROCEDURE — 87070 CULTURE OTHR SPECIMN AEROBIC: CPT

## 2025-06-24 PROCEDURE — 87899 AGENT NOS ASSAY W/OPTIC: CPT

## 2025-06-24 PROCEDURE — 71045 X-RAY EXAM CHEST 1 VIEW: CPT

## 2025-06-24 PROCEDURE — 85610 PROTHROMBIN TIME: CPT

## 2025-06-24 PROCEDURE — 86900 BLOOD TYPING SEROLOGIC ABO: CPT

## 2025-06-24 RX ORDER — HYDROCORTISONE 20 MG
50 TABLET ORAL
Refills: 0 | Status: DISCONTINUED | OUTPATIENT
Start: 2025-06-24 | End: 2025-06-25

## 2025-06-24 RX ORDER — INSULIN LISPRO 100 U/ML
INJECTION, SOLUTION INTRAVENOUS; SUBCUTANEOUS
Refills: 0 | Status: DISCONTINUED | OUTPATIENT
Start: 2025-06-24 | End: 2025-06-29

## 2025-06-24 RX ADMIN — Medication 4 MILLILITER(S): at 13:45

## 2025-06-24 RX ADMIN — Medication 4 MILLILITER(S): at 01:03

## 2025-06-24 RX ADMIN — IPRATROPIUM BROMIDE AND ALBUTEROL SULFATE 3 MILLILITER(S): .5; 2.5 SOLUTION RESPIRATORY (INHALATION) at 20:13

## 2025-06-24 RX ADMIN — GABAPENTIN 400 MILLIGRAM(S): 400 CAPSULE ORAL at 17:21

## 2025-06-24 RX ADMIN — Medication 25 GRAM(S): at 06:42

## 2025-06-24 RX ADMIN — Medication 50 MILLIGRAM(S): at 06:49

## 2025-06-24 RX ADMIN — INSULIN LISPRO 1: 100 INJECTION, SOLUTION INTRAVENOUS; SUBCUTANEOUS at 17:20

## 2025-06-24 RX ADMIN — IPRATROPIUM BROMIDE AND ALBUTEROL SULFATE 3 MILLILITER(S): .5; 2.5 SOLUTION RESPIRATORY (INHALATION) at 01:03

## 2025-06-24 RX ADMIN — Medication 25 GRAM(S): at 22:36

## 2025-06-24 RX ADMIN — FUROSEMIDE 40 MILLIGRAM(S): 10 INJECTION INTRAMUSCULAR; INTRAVENOUS at 06:51

## 2025-06-24 RX ADMIN — Medication 40 MILLIGRAM(S): at 11:49

## 2025-06-24 RX ADMIN — GABAPENTIN 400 MILLIGRAM(S): 400 CAPSULE ORAL at 06:43

## 2025-06-24 RX ADMIN — Medication 1 DOSE(S): at 08:37

## 2025-06-24 RX ADMIN — Medication 1 APPLICATION(S): at 06:17

## 2025-06-24 RX ADMIN — Medication 50 MILLIGRAM(S): at 17:21

## 2025-06-24 RX ADMIN — Medication 25 GRAM(S): at 13:00

## 2025-06-24 RX ADMIN — Medication 4 MILLILITER(S): at 20:13

## 2025-06-24 RX ADMIN — Medication 4 MILLILITER(S): at 08:00

## 2025-06-24 RX ADMIN — IPRATROPIUM BROMIDE AND ALBUTEROL SULFATE 3 MILLILITER(S): .5; 2.5 SOLUTION RESPIRATORY (INHALATION) at 13:46

## 2025-06-24 RX ADMIN — IPRATROPIUM BROMIDE AND ALBUTEROL SULFATE 3 MILLILITER(S): .5; 2.5 SOLUTION RESPIRATORY (INHALATION) at 08:00

## 2025-06-24 RX ADMIN — AMIODARONE HYDROCHLORIDE 100 MILLIGRAM(S): 50 INJECTION, SOLUTION INTRAVENOUS at 06:43

## 2025-06-24 NOTE — PROGRESS NOTE ADULT - SUBJECTIVE AND OBJECTIVE BOX
[facility]  INFECTIOUS DISEASES - [address], [place] [address]  Tel: [phone]     Fax: [phone]  =======================================================    MARTINEZ JONES [id number]    Follow up: Tmax of 99.9. On O2 via nasal cannula. Feels better. Has occasional cough, no blood noted on sputum    Allergies:  No Known Allergies      Antibiotics:  acetaminophen     Tablet .. 650 milliGRAM(s) Oral every 6 hours PRN  albuterol/ipratropium for Nebulization 3 milliLiter(s) Nebulizer every 6 hours  aMIOdarone    Tablet 100 milliGRAM(s) Oral daily  chlorhexidine 2% Cloths 1 Application(s) Topical <User Schedule>  dextrose 5%. 1000 milliLiter(s) IV Continuous <Continuous>  dextrose 5%. 1000 milliLiter(s) IV Continuous <Continuous>  dextrose 50% Injectable 25 Gram(s) IV Push once  dextrose 50% Injectable 12.5 Gram(s) IV Push once  dextrose 50% Injectable 25 Gram(s) IV Push once  dextrose Oral Gel 15 Gram(s) Oral once PRN  fluticasone propionate/ salmeterol 500-50 MICROgram(s) Diskus 1 Dose(s) Inhalation two times a day  furosemide   Injectable 40 milliGRAM(s) IV Push daily  gabapentin 400 milliGRAM(s) Oral every 12 hours  glucagon  Injectable 1 milliGRAM(s) IntraMuscular once  hydrocortisone sodium succinate Injectable 50 milliGRAM(s) IV Push two times a day  insulin lispro (ADMELOG) corrective regimen sliding scale   SubCutaneous three times a day before meals  pantoprazole  Injectable 40 milliGRAM(s) IV Push daily  piperacillin/tazobactam IVPB.. 3.375 Gram(s) IV Intermittent every 8 hours  sodium chloride 3%  Inhalation 4 milliLiter(s) Inhalation every 6 hours       REVIEW OF SYSTEMS:  CONSTITUTIONAL:  No Fever or chills  HEENT:  No sore throat or runny nose.  CARDIOVASCULAR: No chest pain  RESPIRATORY:  + cough, shortness of breath  GASTROINTESTINAL:  No nausea, vomiting or diarrhea.  GENITOURINARY:  No dysuria  NEUROLOGIC:  No headache or dizziness       Physical Exam:  ICU Vital Signs Last 24 Hrs  T(C): 37.6 (24 Jun 2025 13:00), Max: 37.7 (24 Jun 2025 10:00)  T(F): 99.7 (24 Jun 2025 13:00), Max: 99.9 (24 Jun 2025 10:00)  HR: 97 (24 Jun 2025 13:00) (82 - 990)  BP: 138/77 (24 Jun 2025 13:00) (94/73 - 151/81)  BP(mean): 92 (24 Jun 2025 13:00) (69 - 102)  ABP: --  ABP(mean): --  RR: 22 (24 Jun 2025 13:00) (17 - 26)  SpO2: 99% (24 Jun 2025 13:00) (93% - 100%)    O2 Parameters below as of 24 Jun 2025 10:00    O2 Flow (L/min): 6      GEN: NAD  HEENT: normocephalic and atraumatic.   NECK: Supple.   CHEST: + R chest port  LUNGS: Normal respiratory effort  HEART: Regular rate and rhythm   ABDOMEN: Soft, nontender, and nondistended.    EXTREMITIES: B/L Lower leg pitting edema.  NEUROLOGIC: Answering questions appropriately  PSYCHIATRIC: Appropriate affect .    Labs:  06-24    147[H]  |  104  |  34[H]  ----------------------------<  120[H]  3.8   |  40[H]  |  0.95    Ca    9.6      24 Jun 2025 05:30  Phos  2.9     06-24  Mg     2.0     06-24    TPro  5.6[L]  /  Alb  2.6[L]  /  TBili  0.7  /  DBili  x   /  AST  6[L]  /  ALT  15  /  AlkPhos  53  06-24                          8.4    4.62  )-----------( 197      ( 24 Jun 2025 05:30 )             26.9     PT/INR - ( 24 Jun 2025 05:30 )   PT: 14.7 sec;   INR: 1.26 ratio         PTT - ( 24 Jun 2025 05:30 )  PTT:32.7 sec  Urinalysis Basic - ( 24 Jun 2025 05:30 )    Color: x / Appearance: x / SG: x / pH: x  Gluc: 120 mg/dL / Ketone: x  / Bili: x / Urobili: x   Blood: x / Protein: x / Nitrite: x   Leuk Esterase: x / RBC: x / WBC x   Sq Epi: x / Non Sq Epi: x / Bacteria: x      LIVER FUNCTIONS - ( 24 Jun 2025 05:30 )  Alb: 2.6 g/dL / Pro: 5.6 g/dL / ALK PHOS: 53 U/L / ALT: 15 U/L / AST: 6 U/L / GGT: x             RECENT CULTURES:  06-16 @ 13:09 Bronch Wash Bronchial Wash     Commensal marilee consistent with body site    Rare Squamous epithelial cells seen per low power field  No polymorphonuclear leukocytes seen per low power field  No organisms seen per oil power field      06-16 @ 11:55 Bronch Wash Bronchial Wash     Culture is being performed.        06-16 @ 11:53 Bronch Wash Bronchial Wash     Commensal marilee consistent with body site    Rare polymorphonuclear leukocytes per oil power field  No Squamous epithelial cells per oil power field  No organisms seen per oil power field            All imaging and data are reviewed.

## 2025-06-24 NOTE — PROGRESS NOTE ADULT - SUBJECTIVE AND OBJECTIVE BOX
CHIEF COMPLAINT/ REASON FOR VISIT  .. Patient was seen to address the  issue listed under PROBLEM LIST which is located toward bottom of this note     [facility] 01A    Allergies    No Known Allergies    Intolerances        PAST MEDICAL & SURGICAL HISTORY:  COPD (chronic obstructive pulmonary disease)      DM (diabetes mellitus)  diet-controlled      PAF (paroxysmal atrial fibrillation)  s/p ablation      Hiatal hernia      H/O aplastic anemia      History of IBS      H/O osteoporosis      HLD (hyperlipidemia)      PMR (polymyalgia rheumatica)      History of basal cell cancer      Chronic kidney disease (CKD)      Hypotension      Presence of IVC filter      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      History of immunodeficiency      Lumbar compression fracture      H/O hiatal hernia      H/O pulmonary fibrosis      H/O sinus bradycardia      History of fracture of right hip  "unoperable"      S/P hysterectomy      S/P foot surgery      S/P knee surgery      After cataract  bilateral eye cataract surgically removed      Closed right hip fracture  rigth hip ORIF july 19 2016      S/P IVC filter  nov 2016      S/P carpal tunnel release  Right 2008 & 6/27/17      H/O kyphoplasty      Port-A-Cath in place  right chest          FAMILY HISTORY:  Family history of bladder cancer (Mother)    Family history of myocardial infarction (Father)    FH: atrial fibrillation (Father)        Home Medications:  amiodarone 200 mg oral tablet: 0.5 tab(s) orally once a day (08 Jun 2025 16:43)  Bentyl 10 mg oral capsule: 1 cap(s) orally 2 times a day, As Needed (08 Jun 2025 16:43)  Eliquis 2.5 mg oral tablet: 1 tab(s) orally 2 times a day (08 Jun 2025 16:43)  fluticasone-salmeterol 500 mcg-50 mcg/inh inhalation powder: 1 puff(s) inhaled 2 times a day (08 Jun 2025 16:43)  folic acid 1 mg oral tablet: 1 tab(s) orally once a day (in the morning) (08 Jun 2025 16:43)  gabapentin 400 mg oral capsule: 1 cap(s) orally 2 times a day (08 Jun 2025 16:43)  ipratropium-albuterol 0.5 mg-2.5 mg/3 mL inhalation solution: 3 milliliter(s) inhaled every 6 hours As needed Shortness of Breath and/or Wheezing (08 Jun 2025 16:43)  IVIG monthly:  (08 Jun 2025 16:43)  leflunomide 10 mg oral tablet: 1 tab(s) orally once a day (08 Jun 2025 16:43)  midodrine 10 mg oral tablet: 1 tab(s) orally every 8 hours (08 Jun 2025 16:43)  montelukast 10 mg oral tablet: 1 tab(s) orally once a day (08 Jun 2025 16:43)  pantoprazole 40 mg oral delayed release tablet: 1 tab(s) orally once a day (before a meal) (08 Jun 2025 16:43)  predniSONE 5 mg oral tablet: 1 tab(s) orally once a day (08 Jun 2025 16:43)  Procrit 10,000 units/mL preservative-free injectable solution: injectable once a week WEDNESDAY (08 Jun 2025 16:43)  Reclast Infusion , IV x 1 yearly:  (08 Jun 2025 16:43)  simvastatin 10 mg oral tablet: 1 tab(s) orally once a day (at bedtime) (08 Jun 2025 16:43)  tiotropium 2.5 mcg/inh inhalation aerosol: 2 puff(s) inhaled once a day (08 Jun 2025 16:43)  tiZANidine: 2 tab(s) orally once a day (at bedtime) as needed for  muscle spasm (08 Jun 2025 16:43)  torsemide 20 mg oral tablet: 1 tab(s) orally once a day (in the morning) (08 Jun 2025 16:49)      MEDICATIONS  (STANDING):  albuterol/ipratropium for Nebulization 3 milliLiter(s) Nebulizer every 6 hours  aMIOdarone    Tablet 100 milliGRAM(s) Oral daily  chlorhexidine 2% Cloths 1 Application(s) Topical <User Schedule>  dextrose 5%. 1000 milliLiter(s) (50 mL/Hr) IV Continuous <Continuous>  dextrose 5%. 1000 milliLiter(s) (100 mL/Hr) IV Continuous <Continuous>  dextrose 50% Injectable 25 Gram(s) IV Push once  dextrose 50% Injectable 12.5 Gram(s) IV Push once  dextrose 50% Injectable 25 Gram(s) IV Push once  fluticasone propionate/ salmeterol 500-50 MICROgram(s) Diskus 1 Dose(s) Inhalation two times a day  furosemide   Injectable 40 milliGRAM(s) IV Push daily  gabapentin 400 milliGRAM(s) Oral every 12 hours  glucagon  Injectable 1 milliGRAM(s) IntraMuscular once  hydrocortisone sodium succinate Injectable 50 milliGRAM(s) IV Push every 8 hours  insulin lispro (ADMELOG) corrective regimen sliding scale   SubCutaneous every 6 hours  pantoprazole  Injectable 40 milliGRAM(s) IV Push daily  piperacillin/tazobactam IVPB.. 3.375 Gram(s) IV Intermittent every 8 hours  sodium chloride 3%  Inhalation 4 milliLiter(s) Inhalation every 6 hours    MEDICATIONS  (PRN):  acetaminophen     Tablet .. 650 milliGRAM(s) Oral every 6 hours PRN Mild Pain (1 - 3)  dextrose Oral Gel 15 Gram(s) Oral once PRN Blood Glucose LESS THAN 70 milliGRAM(s)/deciliter      Diet, Regular:   DASH/TLC Sodium & Cholesterol Restricted (06-23-25 @ 13:52) [Active]          Vital Signs Last 24 Hrs  T(C): 37.4 (24 Jun 2025 05:00), Max: 37.6 (23 Jun 2025 11:00)  T(F): 99.3 (24 Jun 2025 05:00), Max: 99.7 (23 Jun 2025 11:00)  HR: 90 (24 Jun 2025 05:26) (71 - 990)  BP: 130/54 (24 Jun 2025 05:00) (95/45 - 151/81)  BP(mean): 76 (24 Jun 2025 05:00) (61 - 102)  RR: 21 (24 Jun 2025 05:00) (17 - 26)  SpO2: 96% (24 Jun 2025 05:26) (91% - 100%)    Parameters below as of 24 Jun 2025 05:00  Patient On (Oxygen Delivery Method): BiPAP/CPAP          06-22-25 @ 07:01  -  06-23-25 @ 07:00  --------------------------------------------------------  IN: 538.7 mL / OUT: 1 mL / NET: 537.7 mL    06-23-25 @ 07:01  -  06-24-25 @ 06:01  --------------------------------------------------------  IN: 767 mL / OUT: 1100 mL / NET: -333 mL              LABS:                        9.2    7.82  )-----------( 209      ( 23 Jun 2025 16:45 )             28.7     06-23    145  |  104  |  36[H]  ----------------------------<  86  4.2   |  40[H]  |  1.00    Ca    9.1      23 Jun 2025 05:20  Phos  3.3     06-23  Mg     2.0     06-23    TPro  5.4[L]  /  Alb  2.7[L]  /  TBili  0.6  /  DBili  x   /  AST  3[L]  /  ALT  14  /  AlkPhos  52  06-23      Urinalysis Basic - ( 23 Jun 2025 05:20 )    Color: x / Appearance: x / SG: x / pH: x  Gluc: 86 mg/dL / Ketone: x  / Bili: x / Urobili: x   Blood: x / Protein: x / Nitrite: x   Leuk Esterase: x / RBC: x / WBC x   Sq Epi: x / Non Sq Epi: x / Bacteria: x        ABG - ( 23 Jun 2025 02:08 )  pH, Arterial: 7.40  pH, Blood: x     /  pCO2: 67    /  pO2: 85    / HCO3: 42    / Base Excess: 13.5  /  SaO2: x                   WBC:  WBC Count: 7.82 K/uL (06-23 @ 16:45)  WBC Count: 7.37 K/uL (06-23 @ 05:20)  WBC Count: 15.13 K/uL (06-22 @ 23:14)  WBC Count: 7.28 K/uL (06-22 @ 07:40)  WBC Count: 3.82 K/uL (06-21 @ 06:15)  WBC Count: 8.70 K/uL (06-20 @ 08:15)      MICROBIOLOGY:  RECENT CULTURES:                  Sodium:  Sodium: 145 mmol/L (06-23 @ 05:20)  Sodium: 146 mmol/L (06-22 @ 07:40)  Sodium: 146 mmol/L (06-21 @ 09:35)  Sodium: 144 mmol/L (06-20 @ 08:15)      1.00 mg/dL 06-23 @ 05:20  0.88 mg/dL 06-22 @ 07:40  1.10 mg/dL 06-21 @ 09:35  0.85 mg/dL 06-20 @ 08:15      Hemoglobin:  Hemoglobin: 9.2 g/dL (06-23 @ 16:45)  Hemoglobin: 7.2 g/dL (06-23 @ 05:20)  Hemoglobin: 8.2 g/dL (06-22 @ 23:14)  Hemoglobin: 7.9 g/dL (06-22 @ 12:30)  Hemoglobin: 8.3 g/dL (06-22 @ 07:40)  Hemoglobin: 9.3 g/dL (06-21 @ 06:15)  Hemoglobin: 9.2 g/dL (06-20 @ 08:15)      Platelets: Platelet Count - Automated: 209 K/uL (06-23 @ 16:45)  Platelet Count - Automated: 190 K/uL (06-23 @ 05:20)  Platelet Count - Automated: 259 K/uL (06-22 @ 23:14)  Platelet Count - Automated: 253 K/uL (06-22 @ 07:40)  Platelet Count - Automated: 707 K/uL (06-21 @ 06:15)  Platelet Count - Automated: 277 K/uL (06-20 @ 08:15)      LIVER FUNCTIONS - ( 23 Jun 2025 05:20 )  Alb: 2.7 g/dL / Pro: 5.4 g/dL / ALK PHOS: 52 U/L / ALT: 14 U/L / AST: 3 U/L / GGT: x             Urinalysis Basic - ( 23 Jun 2025 05:20 )    Color: x / Appearance: x / SG: x / pH: x  Gluc: 86 mg/dL / Ketone: x  / Bili: x / Urobili: x   Blood: x / Protein: x / Nitrite: x   Leuk Esterase: x / RBC: x / WBC x   Sq Epi: x / Non Sq Epi: x / Bacteria: x        RADIOLOGY & ADDITIONAL STUDIES:      MICROBIOLOGY:  RECENT CULTURES:

## 2025-06-24 NOTE — CASE MANAGEMENT PROGRESS NOTE - NSCMPROGRESSNOTE_GEN_ALL_CORE
Patient discussed during rounds and remains acute in ICU on BIPAP. Dx: Septic shock, PNA. Patient receiving IVPB Zosyn, IVP Lasix, IVP Protonix, and IVP Solu-Cortef. Pending- ABGs, Monitor fevers and WBCs. Patient recommended for [facility] services, SW following. CM will continue to collaborate with interdisciplinary team and remain available to assist.

## 2025-06-24 NOTE — PROGRESS NOTE ADULT - PROBLEM SELECTOR PLAN 4
Suspect secondary to shock (RESOLVED)   -TTE performed 6/2 showing normal LV function with EF 71% and LV hypertrophy, normal RV size and function, moderate LA dilation   - TTE: EF 67%, mod TR, R. pleural effusion   - The patient has been weaned off of vasopressors, monitor hemodynamics closely  -cardio following

## 2025-06-24 NOTE — PROGRESS NOTE ADULT - ASSESSMENT
84 female PMH of A-fib on Eliquis, COPD, CKD, aplastic anemia, polymyalgia rheumatica, on chronic steroids, avascular necrosis of hips, HLD, HTN, DM, recent admission to [place] 5/26 through 6/5/2025 for CHF/fluid overload/COPD exacerbation, presents to the ED form [facility] for evaluation of fever and generalized feeling of being unwell, found to be septic and hypotensive on 6/8 admission, admitted for ICU briefly for vasopressors support and placed on floor. MICU reconsulted on 6/22 for hypoxemia/hypercapnia during the day at that time pt appeared well on HFNC 30%fio2 therefore pt stayed on GMF. RRT tonight for AMS concern for hypercapnic respiratory failure      Neuro:   -mentation improved, off of precedex     CV:   -hx of CHF on lasix and amiodarone at home   -continue lasix 40mg qd     Resp:   -Acute on chronic hypercapnic respiratory failure with CHF exacerbation and superimposed pneumonia.   -pt doing well this AM, can monitor off Bipap during the day. Will need Bipap at night   -stress steroids solucortef 50 q8h  -ABG overnight   -CT chest yesterday with increased GGO with very large dense left sided infiltrate but multifocal pneumonia (compared to previous 6/10 significantly worse).   -continue bronchodilators. Head of Bed>30 for aspiration prevention.  -pt with episodes of hemoptysis, now resolved. Pulm and Heme following     Diet:  -NPO for now     Renal:   -Cr stable     ID:   -continue Zosyn for PNA  -monitor for fevers and WBC     Heme:   -Pt has aplastic anemia, receiving PRBC transfusions ~8 weeks (via Rt subclavian mediport)   -Pt's baseline appears to be around 8? Hgb has been downtrending 9.3->8.3->7.9. Will give 1 unit RBC now   -No signs of yemi bleeding, off full AC now.        84 female PMH of A-fib on Eliquis, COPD, CKD, aplastic anemia, polymyalgia rheumatica, on chronic steroids, avascular necrosis of hips, HLD, HTN, DM, recent admission to [place] 5/26 through 6/5/2025 for CHF/fluid overload/COPD exacerbation, presents to the ED form [facility] for evaluation of fever and generalized feeling of being unwell, found to be septic and hypotensive on 6/8 admission, admitted for ICU briefly for vasopressors support and placed on floor. MICU reconsulted on 6/22 for hypoxemia/hypercapnia during the day at that time pt appeared well on HFNC 30%fio2 therefore pt stayed on GMF. RRT tonight for AMS concern for hypercapnic respiratory failure      Neuro:   -at baselines   -mentation improved, off of precedex   -continue home gabapentin     CV:   -hx of CHF on lasix and amiodarone at home   -continue lasix 40mg qd and amiodarone 100mg qd     Resp:   -Acute on chronic hypercapnic respiratory failure with CHF exacerbation and superimposed pneumonia.   -pt doing well this AM, can monitor off Bipap during the day. Continue Bipap at night   -CT chest 6/22 with increased GGO with very large dense left sided infiltrate but multifocal pneumonia (compared to previous 6/10 significantly worse).   -continue Zosyn  -continue duonebs q6h. Head of Bed>30 for aspiration prevention.  -pt with episodes of hemoptysis, now resolving. Pulm and Heme following. Rheumatological workup neg. Had bronchoscopy last week, so significant results.      Diet:  -DASH diet  -continue protonix     Renal:   -Cr stable   -switch stress steroids solucortef 50mg to BID     ID:   -continue Zosyn for PNA for 5 days   -pending strep pneumo and legionella tests   -monitor for fevers and WBC   -MRSA neg     Endo:   -continue solucortef 50mg BID. Pt on home prednisone 5mg qd   -low dose sliding scale     Heme:   -Pt has aplastic anemia, receiving PRBC transfusions ~8 weeks (via Rt subclavian mediport)   -Pt's baseline appears to be around 8. S/p 1unit pRBC on 6/23 with Hgb 8.4 this AM    -No signs of yemi bleeding, off full AC now.

## 2025-06-24 NOTE — PROGRESS NOTE ADULT - PROBLEM SELECTOR PLAN 1
In the ED, tmax of 101 w/ KIMBERLY on CKD with Cr 1.90 (baseline 1.2-1.6), Hypotensive with SBP 80's (pt on midodrine therapy, usual 's). also w/ leukocytosis of 32.33. Procalcitonin of 13.6, RVP neg. Pt treated in ICU for septic shock requiring pressors, downgraded from the ICU to telemetry on 6/10  - CT chest 6/10 with multifocal R sided PNA  - completed zosyn on 06/18; restarted abx as per ICU recommendations on 06/22-- received 1x vanc and restarted zosyn   - completed weaning steroids on 06/18, resumed high dose steroids 06/22   - quant (negative) and fungitell 52 (negative)  - MRSA/MSSA PCR negative  - trend WBC and fever curve, supplemental O2 prn to maintain SPO2 > 92% -- taper o2  - ID consulted Dr Juan A varma appreciated

## 2025-06-24 NOTE — PROGRESS NOTE ADULT - SUBJECTIVE AND OBJECTIVE BOX
INTERVAL HPI/OVERNIGHT EVENTS: No acute overnight events occurred.    SUBJECTIVE: Seen and examined pt at bedside. Has no acute complaints at this time.    Review of Systems:  Constitutional: No fever, chills, fatigue  Neuro: No headache, numbness, weakness  Resp: No cough, wheezing, shortness of breath  CVS: No chest pain, palpitations, leg swelling  GI: No abdominal pain, nausea, vomiting, diarrhea   : No dysuria, frequency, incontinence  Skin: No itching, burning, rashes, or lesions   Msk: No joint pain or swelling  Psych: No depression, anxiety, mood swings    ICU Vital Signs Last 24 Hrs  T(C): 37.7 (24 Jun 2025 10:00), Max: 37.7 (24 Jun 2025 10:00)  T(F): 99.9 (24 Jun 2025 10:00), Max: 99.9 (24 Jun 2025 10:00)  HR: 106 (24 Jun 2025 10:00) (82 - 990)  BP: 132/72 (24 Jun 2025 10:00) (94/73 - 151/81)  BP(mean): 88 (24 Jun 2025 10:00) (69 - 102)  ABP: --  ABP(mean): --  RR: 23 (24 Jun 2025 10:00) (17 - 26)  SpO2: 95% (24 Jun 2025 10:00) (91% - 100%)    O2 Parameters below as of 24 Jun 2025 10:00    O2 Flow (L/min): 6            06-23-25 @ 07:01  -  06-24-25 @ 07:00  --------------------------------------------------------  IN: 767 mL / OUT: 1400 mL / NET: -633 mL    06-24-25 @ 07:01  -  06-24-25 @ 10:41  --------------------------------------------------------  IN: 0 mL / OUT: 500 mL / NET: -500 mL        CAPILLARY BLOOD GLUCOSE      POCT Blood Glucose.: 142 mg/dL (24 Jun 2025 06:13)      I&O's Summary    23 Jun 2025 07:01  -  24 Jun 2025 07:00  --------------------------------------------------------  IN: 767 mL / OUT: 1400 mL / NET: -633 mL    24 Jun 2025 07:01  -  24 Jun 2025 10:41  --------------------------------------------------------  IN: 0 mL / OUT: 500 mL / NET: -500 mL        PHYSICAL EXAM:  General: NAD  Neurology: awake and alert  HEENT: NC/AT  Respiratory: CTA b/l, no rales or rhonchi noted  Cardiovascular: RRR, normal S1S2, no M/R/G  Abdomen: soft, NT/ND, +BS, no palpable masses  Extremities: WWP, no clubbing, cyanosis, or edema  Skin: warm/dry      Meds:  piperacillin/tazobactam IVPB.. IV Intermittent    aMIOdarone    Tablet Oral  furosemide   Injectable IV Push    dextrose 50% Injectable IV Push  dextrose 50% Injectable IV Push  dextrose 50% Injectable IV Push  dextrose Oral Gel Oral PRN  glucagon  Injectable IntraMuscular  hydrocortisone sodium succinate Injectable IV Push  insulin lispro (ADMELOG) corrective regimen sliding scale SubCutaneous    albuterol/ipratropium for Nebulization Nebulizer  fluticasone propionate/ salmeterol 500-50 MICROgram(s) Diskus Inhalation  sodium chloride 3%  Inhalation Inhalation    acetaminophen     Tablet .. Oral PRN  gabapentin Oral        pantoprazole  Injectable IV Push      dextrose 5%. IV Continuous  dextrose 5%. IV Continuous      chlorhexidine 2% Cloths Topical                              8.4    4.62  )-----------( 197      ( 24 Jun 2025 05:30 )             26.9       06-24    147[H]  |  104  |  34[H]  ----------------------------<  120[H]  3.8   |  40[H]  |  0.95    Ca    9.6      24 Jun 2025 05:30  Phos  2.9     06-24  Mg     2.0     06-24    TPro  5.6[L]  /  Alb  2.6[L]  /  TBili  0.7  /  DBili  x   /  AST  6[L]  /  ALT  15  /  AlkPhos  53  06-24          PT/INR - ( 24 Jun 2025 05:30 )   PT: 14.7 sec;   INR: 1.26 ratio         PTT - ( 24 Jun 2025 05:30 )  PTT:32.7 sec  Urinalysis Basic - ( 24 Jun 2025 05:30 )    Color: x / Appearance: x / SG: x / pH: x  Gluc: 120 mg/dL / Ketone: x  / Bili: x / Urobili: x   Blood: x / Protein: x / Nitrite: x   Leuk Esterase: x / RBC: x / WBC x   Sq Epi: x / Non Sq Epi: x / Bacteria: x                  Radiology:    Bedside Ultrasound:    Tubes/Lines:      GLOBAL ISSUE/BEST PRACTICE:  Analgesia:  Sedation:  HOB elevation: Y  Stress ulcer prophylaxis:  VTE prophylaxis:  Glycemic control:  Nutrition:    CODE STATUS:        INTERVAL HPI/OVERNIGHT EVENTS: No acute overnight events occurred.    SUBJECTIVE: Seen and examined pt at bedside. Has no acute complaints at this time. Pt tolerated Bipap overnight and is currently on 5L NC satting 98%.     Review of Systems:  Constitutional: No fever, chills, fatigue  Neuro: No headache, numbness, weakness  Resp: No cough, wheezing, shortness of breath  CVS: No chest pain, palpitations, leg swelling  GI: No abdominal pain, nausea, vomiting, diarrhea   : No dysuria, frequency, incontinence  Skin: No itching, burning, rashes, or lesions   Msk: No joint pain or swelling    ICU Vital Signs Last 24 Hrs  T(C): 37.7 (24 Jun 2025 10:00), Max: 37.7 (24 Jun 2025 10:00)  T(F): 99.9 (24 Jun 2025 10:00), Max: 99.9 (24 Jun 2025 10:00)  HR: 106 (24 Jun 2025 10:00) (82 - 990)  BP: 132/72 (24 Jun 2025 10:00) (94/73 - 151/81)  BP(mean): 88 (24 Jun 2025 10:00) (69 - 102)  ABP: --  ABP(mean): --  RR: 23 (24 Jun 2025 10:00) (17 - 26)  SpO2: 95% (24 Jun 2025 10:00) (91% - 100%)    O2 Parameters below as of 24 Jun 2025 10:00    O2 Flow (L/min): 6            06-23-25 @ 07:01  -  06-24-25 @ 07:00  --------------------------------------------------------  IN: 767 mL / OUT: 1400 mL / NET: -633 mL    06-24-25 @ 07:01  -  06-24-25 @ 10:41  --------------------------------------------------------  IN: 0 mL / OUT: 500 mL / NET: -500 mL        CAPILLARY BLOOD GLUCOSE      POCT Blood Glucose.: 142 mg/dL (24 Jun 2025 06:13)      I&O's Summary    23 Jun 2025 07:01  -  24 Jun 2025 07:00  --------------------------------------------------------  IN: 767 mL / OUT: 1400 mL / NET: -633 mL    24 Jun 2025 07:01  -  24 Jun 2025 10:41  --------------------------------------------------------  IN: 0 mL / OUT: 500 mL / NET: -500 mL        PHYSICAL EXAM:  General: NAD, on NC   Neurology: awake and alert  HEENT: NC/AT  Respiratory: mild b/l wheezing, no rales or rhonchi noted  Cardiovascular: RRR, normal S1S2, no M/R/G  Abdomen: soft, NT/ND, +BS, no palpable masses  Extremities: b/l foot nonpitting edema, with improvement from yesterday, WWP, no clubbing, cyanosis  Skin: warm/dry      Meds:  piperacillin/tazobactam IVPB.. IV Intermittent    aMIOdarone    Tablet Oral  furosemide   Injectable IV Push    dextrose 50% Injectable IV Push  dextrose 50% Injectable IV Push  dextrose 50% Injectable IV Push  dextrose Oral Gel Oral PRN  glucagon  Injectable IntraMuscular  hydrocortisone sodium succinate Injectable IV Push  insulin lispro (ADMELOG) corrective regimen sliding scale SubCutaneous    albuterol/ipratropium for Nebulization Nebulizer  fluticasone propionate/ salmeterol 500-50 MICROgram(s) Diskus Inhalation  sodium chloride 3%  Inhalation Inhalation    acetaminophen     Tablet .. Oral PRN  gabapentin Oral        pantoprazole  Injectable IV Push      dextrose 5%. IV Continuous  dextrose 5%. IV Continuous      chlorhexidine 2% Cloths Topical                              8.4    4.62  )-----------( 197      ( 24 Jun 2025 05:30 )             26.9       06-24    147[H]  |  104  |  34[H]  ----------------------------<  120[H]  3.8   |  40[H]  |  0.95    Ca    9.6      24 Jun 2025 05:30  Phos  2.9     06-24  Mg     2.0     06-24    TPro  5.6[L]  /  Alb  2.6[L]  /  TBili  0.7  /  DBili  x   /  AST  6[L]  /  ALT  15  /  AlkPhos  53  06-24          PT/INR - ( 24 Jun 2025 05:30 )   PT: 14.7 sec;   INR: 1.26 ratio         PTT - ( 24 Jun 2025 05:30 )  PTT:32.7 sec  Urinalysis Basic - ( 24 Jun 2025 05:30 )    Color: x / Appearance: x / SG: x / pH: x  Gluc: 120 mg/dL / Ketone: x  / Bili: x / Urobili: x   Blood: x / Protein: x / Nitrite: x   Leuk Esterase: x / RBC: x / WBC x   Sq Epi: x / Non Sq Epi: x / Bacteria: x                  Tubes/Lines:  peripheral lines       GLOBAL ISSUE/BEST PRACTICE:  Analgesia: N   Sedation: N   HOB elevation: Y  Stress ulcer prophylaxis:   VTE prophylaxis: SCDs   Glycemic control: low dose ISS   Nutrition: DASH diet     CODE STATUS:  FULL CODE

## 2025-06-24 NOTE — PROGRESS NOTE ADULT - ATTENDING COMMENTS
84F with HTN, HLD, COPD, HFpEF, paroxysmal afib on eliquis, DVT/PE s/p IVC filter, COPD, CKD, aplastic anemia requiring pRBC transfusion s/p chest wall port, polymyalgia rheumatica on steroids, AVN of hips, who initially presents with sepsis 2/2 PNA requiring ICU stay, pt has since been transferred to the floor. Course complicated by hemoptysis after reinitiation of anticoagulation, and a/c now discontinued. Overnight pt more obtunded with concern for acute on chronic hypercapnic respiratory failure 2/2 NIV nonadherence. Pt transferred to ICU for NIV with precedex, now with improvement in mental status.    Neuro: Metabolic encephalopathy 2/2 hypercapnic respiratory failure, improving s/p AVAPs. Continue to monitor mental status. D/c precedex. Continue home gabapentin.  Pulm: Acute on chronic hypercapnic and hypoxic respiratory failure requiring AVAPs, would continue qHS and prn. Pt with hemoptysis earlier in the week, now resolved. CT chest with worsening b/l infiltrates concern for PNA vs hemorrhage though no hemoptysis noted. Continue duonebs and advair  CV: Currently HD stable. Continue amiodarone for afib but hold a/c given hemoptysis and anemia. Continue stress dose steroids.   Skin/Lines: chest wall port  GI: Regular diet-- NPO except meds when on AVAPs  /Renal: trend I&Os. Continue lasix 40mg daily  Heme/DVT PPX: aplastic anemia requiring frequent transfusion, now s/p 1U with appropriate response. Monitor off of eliquis given hemoptysis. Trend CBC. SCDs for DVT ppx   Endo: On stress dose steroids; trend FS. ISS.  ID: On zosyn for presumed PNA, follow up sputum cx. MRSA/MSSA negative  Ethics: full code, plan d/w daughter at bedside 84F with HTN, HLD, COPD, HFpEF, paroxysmal afib on eliquis, DVT/PE s/p IVC filter, COPD, CKD, aplastic anemia requiring pRBC transfusion s/p chest wall port, polymyalgia rheumatica on steroids, AVN of hips, who initially presents with sepsis 2/2 PNA requiring ICU stay, pt has since been transferred to the floor. Course complicated by hemoptysis after reinitiation of anticoagulation, and a/c now discontinued. Overnight pt more obtunded with concern for acute on chronic hypercapnic respiratory failure 2/2 NIV nonadherence. Pt transferred to ICU for NIV with precedex, now with improvement in mental status.    Neuro: Metabolic encephalopathy 2/2 hypercapnic respiratory failure, improving s/p AVAPs. Continue to monitor mental status. D/c precedex. Continue home gabapentin.  Pulm: Acute on chronic hypercapnic and hypoxic respiratory failure requiring AVAPs, would continue qHS and prn. Pt with hemoptysis earlier in the week, now resolved. CT chest with worsening b/l infiltrates concern for PNA vs hemorrhage though no hemoptysis noted. Continue duonebs and advair  CV: Currently HD stable. Continue amiodarone for afib but hold a/c given hemoptysis and anemia. Continue stress dose steroids.   Skin/Lines: chest wall port  GI: Regular diet-- NPO except meds when on AVAPs  /Renal: trend I&Os. Continue lasix 40mg daily  Heme/DVT PPX: aplastic anemia requiring frequent transfusion, now s/p 1U with appropriate response. Monitor off of eliquis given hemoptysis. Trend CBC. SCDs for DVT ppx   Endo: On stress dose steroids wean as tolerated; trend FS. ISS.  ID: On zosyn for presumed PNA, follow up sputum cx. MRSA/MSSA negative  Ethics: full code, plan d/w daughter at bedside

## 2025-06-24 NOTE — PROGRESS NOTE ADULT - ASSESSMENT
84F h/o AFib on Eliquis, COPD, CKD, aplastic anemia, polymyalgia rheumatica on chronic steroids, avascular necrosis of hips, HTN, HLD, with recent admission to [place] 5/26 - 6/5 for acute HFpEF exacerbation and COPD exacerbation, discharged to rehab on 6/5 and returning today with right sided chest pain, general malaise and feeling unwell. She reports some slight cough though otherwise no other new symptoms reported. Found to be hypotensive, febrile w/ leukocytosis. Admitted to ICU for septic shock likely 2/2 to PNA and KIMBERLY. Now stable for downgrade to tele.

## 2025-06-24 NOTE — PROGRESS NOTE ADULT - ASSESSMENT
REASON FOR VISIT  .. Management of problems listed below      EVENTS/CURRENT ISSUES.  . 6/24/2025 tr out of icu   . 6/23/2025 tr icu  . 6/22/2025 worsening gas exchange placed on hfnc apixa stoppd   . 6/20/2025 apixa restartd and noc bpap orderde   . 6/16/2025 fob done oozing rul and l lo lobe post basal   . 6/14/2025 iv ufh dced   . 6/13/2025 continues to have mild hemoptysis but is also on iv ufh drip   . 6/13/2025 dw pt re rbaa of bronch and se agrees scheduled for 6/16 10 in or   . 6/13/2025 dw cardio and id   . 6/11/2025 qft funfitell and galactomannan orderd   . 6/11/2025 pt wa sstarted on iv ufh for hemoptysis and pleuritic chest pain but was stopped when vq showed vlp   . 6/10/2025 Mild hemoptysis   . 6/9 tr out of icu  . 6/8/2025  . PNEUMONIA RML 6/8/2025  . SHOCK 6/8/2025   . NOREPI 6/8/2025   . STRESS STEROIDS   .... HYDROCORTISONE 6/8/2025  . A fib (chronic) POA 6/8/2025  . ANEMIA Hb 6/8/2025 Hb 7.5  . KIMBERLY ON CKD Cr 6/8/2025 Cr 1.9        REVIEW OF SYMPTOMS   Able to give ROS  Yes     RELIABILITY +/-   CONSTITUTIONAL Weakness Yes    ENDOCRINE  No heat or cold intolerance    ALLERGY No hives  Sore throat No stridor  RESP Shortness of breath YES   NEURO New weakness No   CARDIAC   Palpitations No         PHYSICAL EXAM    HEENT Unremarkable  atraumatic   RESP Fair air entry  Harsh breath sound   CARDIAC S1 S2 No S3     NO JVD    ABDOMEN No hepatosplenomegaly   PEDAL EDEMA present No calf tenderness    REASON FOR VISIT  .. Management of problems listed below      CC.   . 6/8/2025 brought in by ems for right sided chest pain that started at 3am- nonradiating- patient was offered aspirin by ems- patient refused and stated to ems that she is on plavix and was advised not to take aspirin. patient on 43 litres nasl cannula-     OVERALL PRESENTATION.  . 6/8/2025 84 yr old female with PMHx afib on eliquis, COPD (not on home O2), PE/Rt lower extremity DVT 2017, Rt LE IVC filter 2017 CKD3, aplastic anemia, polymyalgia rheumatica on prednisone 5 mg po qd, requiring PRBC transfusions ~8 weeks (via Rt subclavian mediport), AVN bilat hips, HTN, HLD, HFpEF (75% 6.2.25), diastolic dysfx grade1, recent hospitalization 5/25/25-6/5/25 for ADHF/COPD exacerbation, Pt with eventual improvement and transfer to [facility] facility from where she presents to E.D. this a.m. 6/8/25 with c/o Rt sided chest pain. general malaise. Pt stated began to feel ill evening before presentation, daughter this a.m. endorsed pt lethargic, pale.     In E.D. Pt found to have fever with tmax of 101, KIMBERLY on CKD with sCr 1.90 (baseline 1.2-1.6), HYPOtnsive with SBP 80's (pt on midodrine therapy, usual 's). In addition found to have leukocytosis of 37.6 (baseline 5.25 6/5/25), procalcitonin of 13.6, Hgb 7.5, elevated INR 2.27,  Chest xray demonstrated RML consolidations, concerning for pneum. In E.D. pt receiving 500 cc's NS, Vanco 1 gm, zosyn. Pt received tylenol 1 gm IV x1.     PMH.        BEST PRACTICE ISSUES.  . HOB ELEVATN.    .... Yes  . DIET  .   .... DASH 6/8/2025   . FREE WATER.   ....   .  IV FLUID.  .... 6/15-6/20/2025 1/2 ns 50   ..... 6/8 lr 40 x 12 h   . PHARMAC DVT PPLX .    .... 6/22 apixa dced suspect hemoptysis   .... 6/20 apixa 2.5 x 2   .... 6/12-6/14/2025 iv ufh (hospitalist)  .... 6/11/2025 [facility] 5k.2   .... 6/10/2025 4:46 PM iv ufh   .... [facility] 6/9-6/10/2025   . NON PHARMAC DVT PPLX .    .... 6/23/2025 compr device   . STRESS ULCR PPLX .   .... PROTONIX 40 6/22/2025   . DATE/DM MGMT.   ..... See under Endocrine section   GENERAL DATA .   . COVID.         .... scv2 6/8/2025 scv2 (-)   . GOC.    ....    . ICU STAY.   .... 6/22 -    .... 6/8-6/9   . INFECTION PPLX .   .... CHLORHEXIDINE 2% 6/8/2025   . ALLGY.   ....  nka  . WT.   .... 6/8/2025 69   . BMI.  ....6/8/2025 26      [id number]  VITALS/GAS EXCHANGE/DRIPS    ABGS.   6/20/2025 4p 5l 736/71/65   6/22/2025 7p bpap 16/8/1 731/82/153   6/23/2025 11p bpap 12/5/.4 727/92/57  6/23/2025 2a avaps 450/18/8/25/10 .45 740/67/85   .  VS/ PO/IO/ VENT/ DRIPS.  6/24/2025 afeb 87 100/57   6/24/2025 6l 96%     XXXXXXXX[id number]  PROBLEM ASSESSMENT RECOMMENDATIONS.  RESP.   . GAS EXCHANGE .   .... target PO 90-95%     . NEED FOR HOME OXYGEN   .... 6/18/2025 will need home oxygen if at discharge pulse ox at rest or on exertion is below 88%     . COPD   .... ADVAIR 500 6/8/2025   .... MONTELUKAST 10 6/8/2025   .... SPIRIVA 6/8/2025   .... nacl 3% bid 6/22/2025   .... HYDROCORT 50.2 6/24/2025     . RESP FAILURE  .... 6/8/2025 Has ho hypercapnia   .... 6/8/2025 recommend monitor abg  .... 6/18/2025 requiring 4l nc     . HYPERCAPNIC RESP FAILURE WITHOUT ACIDEMIA   6/20/2025 4p 5l 736/71/65   .... 6/20/2025 sterted noct bpap 35/15/6/16    . MILD HEMOPTYSIS 6/10/2025   .... ct ch 6/10/2025 cw 5/31  ........ new mf infection in rul   ........ unchanged small r greater than left pl effsn   ....... enlarged pulm artery suggesting pulm hytn   .... 6/10/2025  dw hemat cardio id and instead of restarting apixa will start iv UFH which can be easily reversed in case Hb starts dropping but will be the rx for venous thromboembolism as well as for a fib   .... v duplx 6/10 (-)  .... vq 6/10 vlp   .... 6/13/2025 continues to have mild hemoptysis but is also on iv ufh drip   . 6/13/2025 dw pt re rbaa of bronch and se agrees scheduled for 6/16 10 in or   .... 6/13/2025 dw cardio and id   .... 6/16 fob done showed ooze rul and l lo lobe no eb lesions   .... 6/17/2025 continues to have mild hemoptysis   .... 6/17/2025 30% of hemoptysis cases no cause is found  Ordered gbm ab rf dsdna rf apla chapman   .... 6/18/2025 cbmab dsdna inderjit apla ab all (-)   .... 6/12 fungitell galactomannan (-)   .... strep legn ag 6/9 (-)   .... fob 6/16 afb sm (-) cyto (-)  .... 6/18/2025 hemoptysis likely sec to pneumonia in setting of pulm hypertension   .... 6/20/2025 apixa restarted   .... 6/22/2025 apixa dced    . PLEURITIC CHEST PAIN RO VTE   .... v duplx 6/10 (-)  .... vq 6/10 vlp   .... 6/12/2025 iv ufh was stopped 6/11 as vq vlp but was restarted by hospitalist 6/12/2025 for af   .... chest pain resolvd       INFECTION.  . DATA  .... w 6/22-6/23-6/24/2025 w 7.2 - 7.8 - 4.6   .... w 6/8-6/10-6/13-6/14-6/15-6/17-6/18-6/19-6/20/2025   .... w 13.6 - 9.2 - 15.9 - 18- 14 - 9.6 - 11.9- 9.8 - 8.7   .... esr 6/8/2025 esr 17   .... w 6/8-6/9/2025 w 32 - 24   .... ua 6/8/2025 w 4   .... cxr 6/8/2025 cxr dense infiltra r upper hilum r mediport  .... ct  6/10 cw 5/31  ........ r greater than l effs   ........ partial rll atelectasis   ........ new patchy and nodular areas of consolidation rul and associated ground glass opacities   ........ ground glass and nodular opac also scott   ........ numerous tib rml and rll     . MICROBIO  .... sp 6/8 n commensals   .... flu ab 6/8/2025 (-)   .... rsv 6/8/2025 (-)    .... mrsa 6/9/2025 (-)  .... uc 6/8 (-)  .... bc 6/8 (-)     . PNEUMONIA RUL 6/8/2025   .... AZITHRO 6/8-6/11 /2025   .... ZOSYN 6/8-6/15/2025     . PNEUMONIA   .... ct ch 6/22/2025 cw 6/10/2025   ....... incr consolidation and ggo rul   ....... new large consolidatn scott and l lo lobe   .... 6/22/2025 zosyn     CARDIAC.  . PAIN CHEST   .... 6/10/2025 co pain chest r   .... 6/10/2025 check ct to see if she has pleurisy   .... 6/10/2025  dw hemat cardio id and instead of restarting apixa will start iv UFH which can be easily reversed in case Hb starts dropping but will be the rx for venous thromboembolism as well as for a fib     . STRESS STEROIDS   .... HYDROCORTISONE   ........ 6/8/2025 h 50.4  ........ 6/10/2025 h 50.2   ........ 6/12/2025 hydrocort 50   ....... 6/14/2025 h 25  ........ 6/17/2025 dced     . SHOCK   .... MIDODRINE 6/8-6/16/2025 m 10.3   .... DROXIDOPA 6/9-6/16/2025 d 100.3   .... NOREPI 6/8-6/9/2025 n 8/250   .... target map 65 (+)     . CAD    .... Trop 6/8-6/9/2025 Tr 32- 24     . LACTICEMIA   .... la 6/8/2025 la 1.4     . CHF  .... pbnp 6/8/2025 pbnp 6599   .... tte 4/2025 mild ph  n vf  .... tte 6/2/2025   ........ ef 71%   ........ n rvsf    ........ pasp 54   ....... la mod dilatd   .... lasix 40/d 6/22/2025  .... torsemide 20 6/17-6/22/2025  .... lasix 40 once 6/14/2025  .... 6/22/2025 lasix 20 once     . A fib (chronic) POA 6/8/2025  .... AMIODARONE 100 6/8/2025  .... 6/10/2025  dw hemat cardio id and instead of restarting apixa will start iv UFH which can be easily reversed in case Hb starts dropping but will be the rx for venous thromboembolism as well as for a fib   .... 6/12-6/14/2025 iv ufh     HEMAT.  . DATA  .... Plt 6/8/2025 plt 153   .... inr 6/8-6/9/2025 inr 2.27- 1.63      . ANEMIA   .... Hb 6/19-6/20-6/21-6/22-6/23-6/24/2025   .... Hb 7.8 - 9.2 - 9.3 - 8.3 - 9.2 - 8.4   .... Hb 6/8-6/9-6/10-6/11-6/13-6/14-6/15-6/17-6/18/2025   .... Hb 7.5- 7.8 - 7.6- 7.1 - 8.4 - 7.3- 9.1 - 8.1 - 8.5    . TRANSFUSION  .... 6/8  1 u prbc    .... 6/14 2 u prbc   .... 6/22/2025 1 u prbc     GI.   . DIET .   .... dash 6/8/2025     . LFT MONITORING   .... LFTS    6/8/2025  ........ AP     39  ........ AST   11  ........ ALT    35    RENAL.  . DATA  .... Na 6/8/2025 Na 137  .... K 6/8/2025 K 4   .... CO2 6/8/2025 co2 29   .... ag 6/8/2025 ag 9  .... alb 6/8/2025 alb 3.4     . KIMBERLY ON CKD   .... Cr 6/8-6/9-6/10-6/11-6/13-6/14/2025   .... Cr 1.9 - 1.4 - 1.3 - 1.3 - 1.1 - 1  .... Cr 5/27-5/28-5/29-5/30-6/1/2025   .... Cr 1.2 - 1.3 - 1.3 - 1.6 - 1.6    . HYPERNATREMIA   .... Na 6/24/2025 Na 147    ENDO.  . DM  .  .... 6/8/2025 SL SCALE     NEURO.  . PAIN  .... GABAPENTIN 6/8/2025 g 400.2       [id number]   SUMMARY BASELINE .     84 yr old female pt of Dr Gallegos not on home ox or home cpap used to use trelegy lives with spouse quit 40 y 1/2 ppd with PMHx afib on eliquis, COPD (not on home O2), PE/Rt lower extremity DVT 2017, Rt LE IVC filter 2017 CKD3, aplastic anemia, polymyalgia rheumatica on prednisone 5 mg po qd, requiring PRBC transfusions around 8 weeks (via Rt subclavian mediport), AVN bilat hips, HTN, HLD, HFpEF (75% 6.2.25), diastolic dysfx grade1, recent hospitalization 5/25/25-6/5/25 for ADHF/COPD exacerbation, Pt with eventual improvement and transfer to [facility] facility   CC.   . 6/8/2025 R CHEST PAIN   MAIN ISSUES.  . COPD 6/23/2025 hydrocort 50.3   . HYPERCAPNIC RESP FAILURE: 6/20/2025 4p 5l 736/71/65   .... 6/20/2025 Noct bpap 15/5/35/16 ordrd   . RESP FAILURE 6/22/2025  HFNC 6/22 5p HFNC 80% 50l po 95% tr icu   . MILD HEMOPTYSOIS 6/10/2025 6/22 apixa stoppd   . SUSPECTED BLEEDING IN [place] CAUSED RESP DECOMPENSATION AND NEW OPACITIES ON 6/22 CT  6/22 622 apixa stoppd   . PNEUMONIA RML 6/8/2025:  ZOSYN 6/8-6/16/2025   . PNEUMONIA: 6/22 ct bl ul opac 6/22/2025 zosyn   . PLEURITIS CHEST PAIN 6/10/2025: 6/10 vte excluded vlp vq   . SHOCK 6/8/2025: tr oo icu 6/9   . NOREPI 6/8-6/9/2025   . STRESS STEROIDS : HYDROCORTISONE 6/8-6/17/2025  . A fib (chronic) POA 6/8/20256/22/2025 6/22 apixa stopped   . CHF: tte 6/2/2025  ef 71%  pasp 54  la mod dilatd 6/22 lasix 40   . ANEMIA Hb 6/8-6/13/2025 Hb 7.5- 8.4  . TRANSFUSION 6/8  1 u prbc  6/22 1 u prbc   . KIMBERLY ON CKD Cr 6/8-6/13/2025 Cr 1.9- 1.1  . HYPERNATREMIA 6/24/2025 Na 147   PMH.   . AFon Eliquis,   . COPD (not on home o2),   . CKD,   . aplastic anemia,   . TRANSFUSION 6/2/2025 1 u prbc   . PMR (chronic prednisone with AVN hip),   . HLD,   . HTN,   . DM    PROCEDURES/DEVICES .  . 6/16/2025 FOB br wash rul ooze rul l lo lobe post basal     PAST PROCEDURES   . r mediport poa 6/8/2025     DISCUSSIONS.  .... Discussed with primary care and relevant consultants on an ongoing basis       TIME SPENT.  . Over 36 minutes aggregate care time spent on encounter; activities included   direct patient care, counseling and/or coordinating care reviewing notes, lab data/ imaging , discussion with multidisciplinary team/ patient  /family and explaining in detail risks, benefits, alternatives  of the recommendations     ROBERT HENRIQUEZ 84 6/8/2025 1942

## 2025-06-24 NOTE — PROGRESS NOTE ADULT - ASSESSMENT
84 female PMH of A-fib on Eliquis, COPD, CKD, aplastic anemia, polymyalgia rheumatica, on chronic steroids, avascular necrosis of hips, HLD, HTN, DM, recent admission to [place] 5/26 through 6/5/2025 for CHF/fluid overload/COPD exacerbation, presents to the ED form [facility] for evaluation of fever and generalized feeling of being unwell, found to be septic and hypotensive.       - CT chest 6/10 with multifocal R sided PNA  - Pulm following.    -hx copd on home o2 , baseline o2 keep > 88%   - s/p bronch, 6/16.  Showed blood oozing from RUL but no lesion.  Still with minimal hemoptysis  -fu ID     - on 6/22 had AMS from CO2 narcosis.   - now of of bipap  - compliance with cpap recommended  - monitor ABGs closely       - c/o atypical CP Appears pleuritic  - EKG x 2 showed NSR with PAC, non-ischemic.  Troponin negative     - Continue IV Lasix to maintain neg balance  - Echo 6/12/25 as above EF 67% mild LVH, mod MAC, mild MR, mod to severe pHTN    - Bp remains stable    - Now off Midodrine and Northera  - known hx of dysautonomia monitor bps closely    - Hx of paroxysmal atrial fibrillation and DVT/PE  - restarted on ac with eliquis at 2.5 bid, but now off 2/2 hemoptysis.   - resume when ok with pulm   - having frequent runs of PAT. monitor for now. can give IV BB if sustains  - Monitor and replete electrolytes. Keep K>4.0 and Mg>2.0.  - Continue Amiodarone 100 mg daily    - Will continue to follow.  Patient is at risk for abrupt decompensation

## 2025-06-24 NOTE — PROGRESS NOTE ADULT - ATTENDING COMMENTS
84 female PMH of A-fib on Eliquis, COPD, CKD, aplastic anemia, polymyalgia rheumatica, on chronic steroids, avascular necrosis of hips, HLD, HTN, DM, recent admission to [place] 5/26 through 6/5/2025 for CHF/fluid overload/COPD exacerbation, presents to the ED form [facility] for evaluation of fever and generalized feeling of being unwell, found to be septic and hypotensive, likely 2/2 multifocal PNA. RRT called for AMS 2/2 CO2 retention, transferred to ICU. S/P BIPAP with improved CO2 and mental status. Anemia 2/2 aplastic anemia, Transfused 1 unit pRBC yesterday. On NC now and downgraded from ICU.    Rest of care per plan above  d/w residents .

## 2025-06-24 NOTE — PROGRESS NOTE ADULT - SUBJECTIVE AND OBJECTIVE BOX
Patient is a 84y old  Female who presents with a chief complaint of septic shock, PNA (24 Jun 2025 06:01)      INTERVAL HPI/OVERNIGHT EVENTS: Pt seen and examined. Afebrile overnight. BiPAP used overnight. Her breathing feels improved. She has no acute c/o at this time.     MEDICATIONS  (STANDING):  albuterol/ipratropium for Nebulization 3 milliLiter(s) Nebulizer every 6 hours  aMIOdarone    Tablet 100 milliGRAM(s) Oral daily  chlorhexidine 2% Cloths 1 Application(s) Topical <User Schedule>  dextrose 5%. 1000 milliLiter(s) (50 mL/Hr) IV Continuous <Continuous>  dextrose 5%. 1000 milliLiter(s) (100 mL/Hr) IV Continuous <Continuous>  dextrose 50% Injectable 25 Gram(s) IV Push once  dextrose 50% Injectable 12.5 Gram(s) IV Push once  dextrose 50% Injectable 25 Gram(s) IV Push once  fluticasone propionate/ salmeterol 500-50 MICROgram(s) Diskus 1 Dose(s) Inhalation two times a day  furosemide   Injectable 40 milliGRAM(s) IV Push daily  gabapentin 400 milliGRAM(s) Oral every 12 hours  glucagon  Injectable 1 milliGRAM(s) IntraMuscular once  hydrocortisone sodium succinate Injectable 50 milliGRAM(s) IV Push every 8 hours  insulin lispro (ADMELOG) corrective regimen sliding scale   SubCutaneous every 6 hours  pantoprazole  Injectable 40 milliGRAM(s) IV Push daily  piperacillin/tazobactam IVPB.. 3.375 Gram(s) IV Intermittent every 8 hours  sodium chloride 3%  Inhalation 4 milliLiter(s) Inhalation every 6 hours    MEDICATIONS  (PRN):  acetaminophen     Tablet .. 650 milliGRAM(s) Oral every 6 hours PRN Mild Pain (1 - 3)  dextrose Oral Gel 15 Gram(s) Oral once PRN Blood Glucose LESS THAN 70 milliGRAM(s)/deciliter      Allergies    No Known Allergies    Intolerances        REVIEW OF SYSTEMS:  CONSTITUTIONAL: No fever or chills  HEENT:  No headache, no sore throat  RESPIRATORY: No cough, wheezing, or shortness of breath  CARDIOVASCULAR: No chest pain, palpitations  GASTROINTESTINAL: No abd pain, nausea, vomiting, or diarrhea  GENITOURINARY: No dysuria, frequency, or hematuria  NEUROLOGICAL: no focal weakness or dizziness  MUSCULOSKELETAL: no myalgias   INTEGUMENTARY:     Vital Signs Last 24 Hrs  T(C): 37.4 (24 Jun 2025 08:00), Max: 37.6 (23 Jun 2025 11:00)  T(F): 99.3 (24 Jun 2025 08:00), Max: 99.7 (23 Jun 2025 11:00)  HR: 89 (24 Jun 2025 08:17) (75 - 990)  BP: 145/83 (24 Jun 2025 08:00) (104/55 - 151/81)  BP(mean): 100 (24 Jun 2025 08:00) (69 - 102)  RR: 18 (24 Jun 2025 08:00) (17 - 26)  SpO2: 98% (24 Jun 2025 08:17) (91% - 100%)    Parameters below as of 24 Jun 2025 08:17  Patient On (Oxygen Delivery Method): nasal cannula w/ humidification, 5 LPM        PHYSICAL EXAM:  GENERAL: NAD, elderly female  HEENT:  anicteric, moist mucous membranes  CHEST/LUNG:  moving air well in all 4 lung fields. no accessory m. use. speaking in full sentences.   HEART:  RRR, S1, S2  ABDOMEN:  BS+, soft, nontender, nondistended  MUSCULOSKELETAL: trace bilateral lower extremity edema  NEUROLOGIC: answers questions and follows commands appropriately      LABS:                        8.4    4.62  )-----------( 197      ( 24 Jun 2025 05:30 )             26.9     CBC Full  -  ( 24 Jun 2025 05:30 )  WBC Count : 4.62 K/uL  Hemoglobin : 8.4 g/dL  Hematocrit : 26.9 %  Platelet Count - Automated : 197 K/uL  Mean Cell Volume : 96.1 fl  Mean Cell Hemoglobin : 30.0 pg  Mean Cell Hemoglobin Concentration : 31.2 g/dL  Auto Neutrophil # : 3.27 K/uL  Auto Lymphocyte # : 0.35 K/uL  Auto Monocyte # : 0.97 K/uL  Auto Eosinophil # : 0.00 K/uL  Auto Basophil # : 0.00 K/uL  Auto Neutrophil % : 70.8 %  Auto Lymphocyte % : 7.6 %  Auto Monocyte % : 21.0 %  Auto Eosinophil % : 0.0 %  Auto Basophil % : 0.0 %    24 Jun 2025 05:30    147    |  104    |  34     ----------------------------<  120    3.8     |  40     |  0.95     Ca    9.6        24 Jun 2025 05:30  Phos  2.9       24 Jun 2025 05:30  Mg     2.0       24 Jun 2025 05:30    TPro  5.6    /  Alb  2.6    /  TBili  0.7    /  DBili  x      /  AST  6      /  ALT  15     /  AlkPhos  53     24 Jun 2025 05:30    PT/INR - ( 24 Jun 2025 05:30 )   PT: 14.7 sec;   INR: 1.26 ratio         PTT - ( 24 Jun 2025 05:30 )  PTT:32.7 sec  Urinalysis Basic - ( 24 Jun 2025 05:30 )    Color: x / Appearance: x / SG: x / pH: x  Gluc: 120 mg/dL / Ketone: x  / Bili: x / Urobili: x   Blood: x / Protein: x / Nitrite: x   Leuk Esterase: x / RBC: x / WBC x   Sq Epi: x / Non Sq Epi: x / Bacteria: x      CAPILLARY BLOOD GLUCOSE      POCT Blood Glucose.: 142 mg/dL (24 Jun 2025 06:13)  POCT Blood Glucose.: 136 mg/dL (23 Jun 2025 23:43)  POCT Blood Glucose.: 142 mg/dL (23 Jun 2025 17:20)  POCT Blood Glucose.: 129 mg/dL (23 Jun 2025 11:04)          RADIOLOGY & ADDITIONAL TESTS:    Personally reviewed.     Consultant(s) Notes Reviewed:  [x] YES  [ ] NO

## 2025-06-24 NOTE — PROGRESS NOTE ADULT - PROBLEM SELECTOR PLAN 5
-likely in the setting of sepsis   -on home full AC with Eliquis  - 6/12 heparin gtt started  - 6/13 : heparin gtt now discontinued 2/2 hemoptysis  - restarted Eliquis on 06/20, dc'd on 06/22 for possible bleed

## 2025-06-24 NOTE — PROGRESS NOTE ADULT - PROBLEM SELECTOR PLAN 3
- The patient is noted to have blood-tinged sputum  - requiring PRBC transfusions ~8 weeks (via Rt subclavian mediport)  - Hgb 7.6 (baseline appears to be ~8)   - transfuse <7-7.5  - 6/12 heparin gtt started  - 6/13 : heparin gtt now discontinued 2/2 hemoptysis  - S/p 2 unit PRBC- will give lasix 40mg IVP x1 in between units -- good response  - ordered 1 unit pRBC on 06/19-- improved hgb   - restarted Eliquis 2.5mg BID - but could not tolerata pt possible bleeding upper lung   -ordered 1pRBC on 06/23

## 2025-06-24 NOTE — PROGRESS NOTE ADULT - ASSESSMENT
84 yr old female with PMHx afib on eliquis, COPD (not on home O2), PE/Rt lower extremity DVT 2017, Rt LE IVC filter 2017 CKD3, aplastic anemia, polymyalgia rheumatica on prednisone 5 mg po qd, requiring PRBC transfusions ~8 weeks (via Rt subclavian mediport), AVN bilat hips, HTN, HLD, HFpEF (75% 6.2.25), diastolic dysfx grade1, recent hospitalization 5/25/25-6/5/25 for AHDF/COPD exacerbation, pt also with hx of IVC filter, had Rt subclavian mediport, who was admitted on 6/8 with c/o Rt sided chest pain. general malaise. Pt stated began to feel ill evening before presentation, daughter this a.m. endorsed pt lethargic, pale. Patient was initially admitted in the ICU for hypotension requiring pressors. She is transferred out of the ICU. She still reports some R sided chest pain and SOB. Reports cough which started when she arrived in the hospital. Has some blood tinged sputum.    ID consulted for pneumonia. CT chest showed new multifocal infection predominantly involving the right upper lobe. Suspect respiratory symptoms multifactorial in the setting of CHF, anemia. Blood cultures remain no growth. COVID/FLU/RSV negative. MRSA nasal PCR, urine strep and legionella antigen negative. Respiratory culture grew commensal marilee consistent with body site. Serum Fungitell and galactomannan low. Quantiferon indeterminate, but likely due to underlying immunosuppression.    On 6/16 s/p bronchoscopy. Had oozing blood from RUL but no endobronchial lesions noted. BAL AFB gram stain negative, and BAL culture grew commensal marilee consistent with body site. Completed 10 days of Zosyn.     On 6/22, noted to have AMS with CO2 retention suspected. Transferred to ICU and on BIPAP. Suspect respiratory symptoms are multifactorial, but also started on Zosyn empirically. Repeat CT chest showed increased consolidation and ground-glass opacity within right upper lobe, new large consolidations within left upper lobe and left lower lobe. Had low grade temp, but no fever and no leukocytosis. Repeat MRSA nasal PCR negative.    #Pneumonia  #Hemoptysis  #Acute hypoxic respiratory failure    -continue Zosyn--for 5-7 days  -suggest repeat cultures if febrile  -follow cultures to completion  -I will be covered by Dr. Donnelly and Dr. Barron Carter on 6/25-6/27/25.    Kimberly Velazco MD  Division of Infectious Diseases   Cell [phone] between 8am and 6pm   After 6pm and weekends please call ID service at [phone].     35 minutes spent on total encounter assessing patient, examination, chart review, counseling and coordinating care by the attending physician/nurse/care manager.

## 2025-06-24 NOTE — PROGRESS NOTE ADULT - SUBJECTIVE AND OBJECTIVE BOX
[facility] --  Sai Valle Pannella, Patel, Savella, Cohen  Office # [phone]      Follow Up:  hypotension     Subjective/Observations: Patient seen and examined. Events noted. Resting comfortably in bed. No complaints of chest pain, dyspnea, or palpitations reported. No signs of orthopnea or PND.       REVIEW OF SYSTEMS: All other review of systems is negative unless indicated above    PAST MEDICAL & SURGICAL HISTORY:  COPD (chronic obstructive pulmonary disease)      DM (diabetes mellitus)  diet-controlled      PAF (paroxysmal atrial fibrillation)  s/p ablation      Hiatal hernia      H/O aplastic anemia      History of IBS      H/O osteoporosis      HLD (hyperlipidemia)      PMR (polymyalgia rheumatica)      History of basal cell cancer      Chronic kidney disease (CKD)      Hypotension      Presence of IVC filter      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      History of immunodeficiency      Lumbar compression fracture      H/O hiatal hernia      H/O pulmonary fibrosis      H/O sinus bradycardia      History of fracture of right hip  "unoperable"      S/P hysterectomy      S/P foot surgery      S/P knee surgery      After cataract  bilateral eye cataract surgically removed      Closed right hip fracture  rigth hip ORIF july 19 2016      S/P IVC filter  nov 2016      S/P carpal tunnel release  Right 2008 & 6/27/17      H/O kyphoplasty      Port-A-Cath in place  right chest          MEDICATIONS  (STANDING):  albuterol/ipratropium for Nebulization 3 milliLiter(s) Nebulizer every 6 hours  aMIOdarone    Tablet 100 milliGRAM(s) Oral daily  chlorhexidine 2% Cloths 1 Application(s) Topical <User Schedule>  dextrose 5%. 1000 milliLiter(s) (50 mL/Hr) IV Continuous <Continuous>  dextrose 5%. 1000 milliLiter(s) (100 mL/Hr) IV Continuous <Continuous>  dextrose 50% Injectable 25 Gram(s) IV Push once  dextrose 50% Injectable 12.5 Gram(s) IV Push once  dextrose 50% Injectable 25 Gram(s) IV Push once  fluticasone propionate/ salmeterol 500-50 MICROgram(s) Diskus 1 Dose(s) Inhalation two times a day  furosemide   Injectable 40 milliGRAM(s) IV Push daily  gabapentin 400 milliGRAM(s) Oral every 12 hours  glucagon  Injectable 1 milliGRAM(s) IntraMuscular once  hydrocortisone sodium succinate Injectable 50 milliGRAM(s) IV Push two times a day  insulin lispro (ADMELOG) corrective regimen sliding scale   SubCutaneous three times a day before meals  pantoprazole  Injectable 40 milliGRAM(s) IV Push daily  piperacillin/tazobactam IVPB.. 3.375 Gram(s) IV Intermittent every 8 hours  sodium chloride 3%  Inhalation 4 milliLiter(s) Inhalation every 6 hours    MEDICATIONS  (PRN):  acetaminophen     Tablet .. 650 milliGRAM(s) Oral every 6 hours PRN Mild Pain (1 - 3)  dextrose Oral Gel 15 Gram(s) Oral once PRN Blood Glucose LESS THAN 70 milliGRAM(s)/deciliter      Allergies    No Known Allergies    Intolerances            Vital Signs Last 24 Hrs  T(C): 37.7 (24 Jun 2025 12:00), Max: 37.7 (24 Jun 2025 10:00)  T(F): 99.9 (24 Jun 2025 12:00), Max: 99.9 (24 Jun 2025 10:00)  HR: 93 (24 Jun 2025 12:00) (82 - 990)  BP: 130/90 (24 Jun 2025 12:00) (94/73 - 151/81)  BP(mean): 101 (24 Jun 2025 12:00) (69 - 102)  RR: 18 (24 Jun 2025 12:00) (17 - 26)  SpO2: 96% (24 Jun 2025 12:00) (91% - 100%)    Parameters below as of 24 Jun 2025 10:00    O2 Flow (L/min): 6      I&O's Summary    23 Jun 2025 07:01  -  24 Jun 2025 07:00  --------------------------------------------------------  IN: 767 mL / OUT: 1400 mL / NET: -633 mL    24 Jun 2025 07:01  -  24 Jun 2025 12:59  --------------------------------------------------------  IN: 0 mL / OUT: 700 mL / NET: -700 mL          PHYSICAL EXAM:  TELE: SR PAT   Constitutional: NAD, awake    HEENT: Moist Mucous Membranes, Anicteric  Pulmonary: Decreased breath sounds b/l. wheeze   Cardiovascular: IRRR, S1 and S2, No murmurs, rubs, gallops or clicks  Gastrointestinal: Bowel Sounds present, soft, nontender.   Lymph: No peripheral edema. No lymphadenopathy.  Skin: No visible rashes or ulcers.  Psych:  Mood & affect appropriate for situation    LABS: All Labs Reviewed:                        8.4    4.62  )-----------( 197      ( 24 Jun 2025 05:30 )             26.9                         9.2    7.82  )-----------( 209      ( 23 Jun 2025 16:45 )             28.7                         7.2    7.37  )-----------( 190      ( 23 Jun 2025 05:20 )             24.4     24 Jun 2025 05:30    147    |  104    |  34     ----------------------------<  120    3.8     |  40     |  0.95   23 Jun 2025 05:20    145    |  104    |  36     ----------------------------<  86     4.2     |  40     |  1.00   22 Jun 2025 07:40    146    |  104    |  31     ----------------------------<  117    4.3     |  39     |  0.88     Ca    9.6        24 Jun 2025 05:30  Ca    9.1        23 Jun 2025 05:20  Ca    9.0        22 Jun 2025 07:40  Phos  2.9       24 Jun 2025 05:30  Phos  3.3       23 Jun 2025 05:20  Mg     2.0       24 Jun 2025 05:30  Mg     2.0       23 Jun 2025 05:20    TPro  5.6    /  Alb  2.6    /  TBili  0.7    /  DBili  x      /  AST  6      /  ALT  15     /  AlkPhos  53     24 Jun 2025 05:30  TPro  5.4    /  Alb  2.7    /  TBili  0.6    /  DBili  x      /  AST  3      /  ALT  14     /  AlkPhos  52     23 Jun 2025 05:20  TPro  5.9    /  Alb  2.9    /  TBili  0.7    /  DBili  x      /  AST  <3     /  ALT  21     /  AlkPhos  58     22 Jun 2025 07:40    PT/INR - ( 24 Jun 2025 05:30 )   PT: 14.7 sec;   INR: 1.26 ratio         PTT - ( 24 Jun 2025 05:30 )  PTT:32.7 sec    - Troponin: <-26.1

## 2025-06-24 NOTE — PROGRESS NOTE ADULT - PROBLEM SELECTOR PLAN 12
#Edema:  trace edema  -continue lasix 40mg IV for now, monitor bmp     #VTE ppx: - SCDs  #GI ppx: iv ppi  #Diet: Diet, Regular: DASH/TLC {Sodium & Cholesterol Restricted} (06-08-25 @ 15:56) [Active]  #Activity: Activity - Increase As Tolerated:   Time/Priority:  Routine (06-08-25 @ 14:16) [Active]  #ACP: full code  #Dispo: further plans pending clinical course

## 2025-06-25 LAB
ALBUMIN SERPL ELPH-MCNC: 2.8 G/DL — LOW (ref 3.3–5)
ALP SERPL-CCNC: 56 U/L — SIGNIFICANT CHANGE UP (ref 40–120)
ALT FLD-CCNC: 15 U/L — SIGNIFICANT CHANGE UP (ref 12–78)
ANION GAP SERPL CALC-SCNC: 6 MMOL/L — SIGNIFICANT CHANGE UP (ref 5–17)
AST SERPL-CCNC: 5 U/L — LOW (ref 15–37)
BASOPHILS # BLD AUTO: 0.01 K/UL — SIGNIFICANT CHANGE UP (ref 0–0.2)
BASOPHILS NFR BLD AUTO: 0.2 % — SIGNIFICANT CHANGE UP (ref 0–2)
BILIRUB SERPL-MCNC: 0.7 MG/DL — SIGNIFICANT CHANGE UP (ref 0.2–1.2)
BUN SERPL-MCNC: 32 MG/DL — HIGH (ref 7–23)
CALCIUM SERPL-MCNC: 8.7 MG/DL — SIGNIFICANT CHANGE UP (ref 8.5–10.1)
CHLORIDE SERPL-SCNC: 99 MMOL/L — SIGNIFICANT CHANGE UP (ref 96–108)
CO2 SERPL-SCNC: 41 MMOL/L — HIGH (ref 22–31)
CREAT SERPL-MCNC: 0.95 MG/DL — SIGNIFICANT CHANGE UP (ref 0.5–1.3)
EGFR: 60 ML/MIN/1.73M2 — SIGNIFICANT CHANGE UP
EGFR: 60 ML/MIN/1.73M2 — SIGNIFICANT CHANGE UP
EOSINOPHIL # BLD AUTO: 0 K/UL — SIGNIFICANT CHANGE UP (ref 0–0.5)
EOSINOPHIL NFR BLD AUTO: 0 % — SIGNIFICANT CHANGE UP (ref 0–6)
GLUCOSE SERPL-MCNC: 94 MG/DL — SIGNIFICANT CHANGE UP (ref 70–99)
GRAM STN FLD: SIGNIFICANT CHANGE UP
HCT VFR BLD CALC: 26 % — LOW (ref 34.5–45)
HGB BLD-MCNC: 8.5 G/DL — LOW (ref 11.5–15.5)
IMM GRANULOCYTES # BLD AUTO: 0.04 K/UL — SIGNIFICANT CHANGE UP (ref 0–0.07)
IMM GRANULOCYTES NFR BLD AUTO: 0.8 % — SIGNIFICANT CHANGE UP (ref 0–0.9)
LEGIONELLA AG UR QL: NEGATIVE — SIGNIFICANT CHANGE UP
LYMPHOCYTES # BLD AUTO: 0.58 K/UL — LOW (ref 1–3.3)
LYMPHOCYTES NFR BLD AUTO: 11.3 % — LOW (ref 13–44)
MAGNESIUM SERPL-MCNC: 1.6 MG/DL — SIGNIFICANT CHANGE UP (ref 1.6–2.6)
MCHC RBC-ENTMCNC: 30.8 PG — SIGNIFICANT CHANGE UP (ref 27–34)
MCHC RBC-ENTMCNC: 32.7 G/DL — SIGNIFICANT CHANGE UP (ref 32–36)
MCV RBC AUTO: 94.2 FL — SIGNIFICANT CHANGE UP (ref 80–100)
MONOCYTES # BLD AUTO: 0.99 K/UL — HIGH (ref 0–0.9)
MONOCYTES NFR BLD AUTO: 19.2 % — HIGH (ref 2–14)
NEUTROPHILS # BLD AUTO: 3.53 K/UL — SIGNIFICANT CHANGE UP (ref 1.8–7.4)
NEUTROPHILS NFR BLD AUTO: 68.5 % — SIGNIFICANT CHANGE UP (ref 43–77)
NRBC # BLD AUTO: 0 K/UL — SIGNIFICANT CHANGE UP (ref 0–0)
NRBC # FLD: 0 K/UL — SIGNIFICANT CHANGE UP (ref 0–0)
NRBC BLD AUTO-RTO: 0 /100 WBCS — SIGNIFICANT CHANGE UP (ref 0–0)
PHOSPHATE SERPL-MCNC: 2.4 MG/DL — LOW (ref 2.5–4.5)
PLATELET # BLD AUTO: 186 K/UL — SIGNIFICANT CHANGE UP (ref 150–400)
PMV BLD: 11.2 FL — SIGNIFICANT CHANGE UP (ref 7–13)
POTASSIUM SERPL-MCNC: 3.2 MMOL/L — LOW (ref 3.5–5.3)
POTASSIUM SERPL-SCNC: 3.2 MMOL/L — LOW (ref 3.5–5.3)
PROT SERPL-MCNC: 5.8 G/DL — LOW (ref 6–8.3)
RBC # BLD: 2.76 M/UL — LOW (ref 3.8–5.2)
RBC # FLD: 17.2 % — HIGH (ref 10.3–14.5)
S PNEUM AG UR QL: NEGATIVE — SIGNIFICANT CHANGE UP
SODIUM SERPL-SCNC: 146 MMOL/L — HIGH (ref 135–145)
SPECIMEN SOURCE: SIGNIFICANT CHANGE UP
WBC # BLD: 5.15 K/UL — SIGNIFICANT CHANGE UP (ref 3.8–10.5)
WBC # FLD AUTO: 5.15 K/UL — SIGNIFICANT CHANGE UP (ref 3.8–10.5)

## 2025-06-25 PROCEDURE — 87594 PNEUMCYSTS JIROVECII AMP PRB: CPT

## 2025-06-25 PROCEDURE — A9567: CPT

## 2025-06-25 PROCEDURE — 86036 ANCA SCREEN EACH ANTIBODY: CPT

## 2025-06-25 PROCEDURE — 85610 PROTHROMBIN TIME: CPT

## 2025-06-25 PROCEDURE — 86146 BETA-2 GLYCOPROTEIN ANTIBODY: CPT

## 2025-06-25 PROCEDURE — 87252 VIRUS INOCULATION TISSUE: CPT

## 2025-06-25 PROCEDURE — 87081 CULTURE SCREEN ONLY: CPT

## 2025-06-25 PROCEDURE — 86480 TB TEST CELL IMMUN MEASURE: CPT

## 2025-06-25 PROCEDURE — 93005 ELECTROCARDIOGRAM TRACING: CPT

## 2025-06-25 PROCEDURE — 0241U: CPT

## 2025-06-25 PROCEDURE — A9540: CPT

## 2025-06-25 PROCEDURE — 99233 SBSQ HOSP IP/OBS HIGH 50: CPT

## 2025-06-25 PROCEDURE — 86850 RBC ANTIBODY SCREEN: CPT

## 2025-06-25 PROCEDURE — 87116 MYCOBACTERIA CULTURE: CPT

## 2025-06-25 PROCEDURE — 86225 DNA ANTIBODY NATIVE: CPT

## 2025-06-25 PROCEDURE — 83036 HEMOGLOBIN GLYCOSYLATED A1C: CPT

## 2025-06-25 PROCEDURE — 88305 TISSUE EXAM BY PATHOLOGIST: CPT

## 2025-06-25 PROCEDURE — 84100 ASSAY OF PHOSPHORUS: CPT

## 2025-06-25 PROCEDURE — 87451 POLYVALENT MULT ORG EA AG IA: CPT

## 2025-06-25 PROCEDURE — 92610 EVALUATE SWALLOWING FUNCTION: CPT

## 2025-06-25 PROCEDURE — 85018 HEMOGLOBIN: CPT

## 2025-06-25 PROCEDURE — 80048 BASIC METABOLIC PNL TOTAL CA: CPT

## 2025-06-25 PROCEDURE — 87040 BLOOD CULTURE FOR BACTERIA: CPT

## 2025-06-25 PROCEDURE — 97162 PT EVAL MOD COMPLEX 30 MIN: CPT

## 2025-06-25 PROCEDURE — 87102 FUNGUS ISOLATION CULTURE: CPT

## 2025-06-25 PROCEDURE — 94760 N-INVAS EAR/PLS OXIMETRY 1: CPT

## 2025-06-25 PROCEDURE — 87581 M.PNEUMON DNA AMP PROBE: CPT

## 2025-06-25 PROCEDURE — 87641 MR-STAPH DNA AMP PROBE: CPT

## 2025-06-25 PROCEDURE — 84484 ASSAY OF TROPONIN QUANT: CPT

## 2025-06-25 PROCEDURE — 87070 CULTURE OTHR SPECIMN AEROBIC: CPT

## 2025-06-25 PROCEDURE — 86140 C-REACTIVE PROTEIN: CPT

## 2025-06-25 PROCEDURE — 83880 ASSAY OF NATRIURETIC PEPTIDE: CPT

## 2025-06-25 PROCEDURE — 94660 CPAP INITIATION&MGMT: CPT

## 2025-06-25 PROCEDURE — 93970 EXTREMITY STUDY: CPT

## 2025-06-25 PROCEDURE — 80053 COMPREHEN METABOLIC PANEL: CPT

## 2025-06-25 PROCEDURE — 87205 SMEAR GRAM STAIN: CPT

## 2025-06-25 PROCEDURE — 87305 ASPERGILLUS AG IA: CPT

## 2025-06-25 PROCEDURE — 87449 NOS EACH ORGANISM AG IA: CPT

## 2025-06-25 PROCEDURE — 85014 HEMATOCRIT: CPT

## 2025-06-25 PROCEDURE — P9040: CPT

## 2025-06-25 PROCEDURE — 86431 RHEUMATOID FACTOR QUANT: CPT

## 2025-06-25 PROCEDURE — 71045 X-RAY EXAM CHEST 1 VIEW: CPT

## 2025-06-25 PROCEDURE — 86147 CARDIOLIPIN ANTIBODY EA IG: CPT

## 2025-06-25 PROCEDURE — 87640 STAPH A DNA AMP PROBE: CPT

## 2025-06-25 PROCEDURE — 83735 ASSAY OF MAGNESIUM: CPT

## 2025-06-25 PROCEDURE — 36430 TRANSFUSION BLD/BLD COMPNT: CPT

## 2025-06-25 PROCEDURE — 78582 LUNG VENTILAT&PERFUS IMAGING: CPT

## 2025-06-25 PROCEDURE — 82550 ASSAY OF CK (CPK): CPT

## 2025-06-25 PROCEDURE — 87899 AGENT NOS ASSAY W/OPTIC: CPT

## 2025-06-25 PROCEDURE — 36415 COLL VENOUS BLD VENIPUNCTURE: CPT

## 2025-06-25 PROCEDURE — 86900 BLOOD TYPING SEROLOGIC ABO: CPT

## 2025-06-25 PROCEDURE — 97110 THERAPEUTIC EXERCISES: CPT

## 2025-06-25 PROCEDURE — 86923 COMPATIBILITY TEST ELECTRIC: CPT

## 2025-06-25 PROCEDURE — 82962 GLUCOSE BLOOD TEST: CPT

## 2025-06-25 PROCEDURE — 82553 CREATINE MB FRACTION: CPT

## 2025-06-25 PROCEDURE — 87077 CULTURE AEROBIC IDENTIFY: CPT

## 2025-06-25 PROCEDURE — 81001 URINALYSIS AUTO W/SCOPE: CPT

## 2025-06-25 PROCEDURE — 97530 THERAPEUTIC ACTIVITIES: CPT

## 2025-06-25 PROCEDURE — 84145 PROCALCITONIN (PCT): CPT

## 2025-06-25 PROCEDURE — 83516 IMMUNOASSAY NONANTIBODY: CPT

## 2025-06-25 PROCEDURE — 88112 CYTOPATH CELL ENHANCE TECH: CPT

## 2025-06-25 PROCEDURE — 36600 WITHDRAWAL OF ARTERIAL BLOOD: CPT

## 2025-06-25 PROCEDURE — 82803 BLOOD GASES ANY COMBINATION: CPT

## 2025-06-25 PROCEDURE — 71045 X-RAY EXAM CHEST 1 VIEW: CPT | Mod: 26

## 2025-06-25 PROCEDURE — 97116 GAIT TRAINING THERAPY: CPT

## 2025-06-25 PROCEDURE — 86901 BLOOD TYPING SEROLOGIC RH(D): CPT

## 2025-06-25 PROCEDURE — 85025 COMPLETE CBC W/AUTO DIFF WBC: CPT

## 2025-06-25 PROCEDURE — 85027 COMPLETE CBC AUTOMATED: CPT

## 2025-06-25 PROCEDURE — 93306 TTE W/DOPPLER COMPLETE: CPT

## 2025-06-25 PROCEDURE — 71250 CT THORAX DX C-: CPT

## 2025-06-25 PROCEDURE — 94640 AIRWAY INHALATION TREATMENT: CPT

## 2025-06-25 PROCEDURE — 87798 DETECT AGENT NOS DNA AMP: CPT

## 2025-06-25 PROCEDURE — 87086 URINE CULTURE/COLONY COUNT: CPT

## 2025-06-25 PROCEDURE — 85730 THROMBOPLASTIN TIME PARTIAL: CPT

## 2025-06-25 PROCEDURE — 86038 ANTINUCLEAR ANTIBODIES: CPT

## 2025-06-25 PROCEDURE — 85652 RBC SED RATE AUTOMATED: CPT

## 2025-06-25 PROCEDURE — 83605 ASSAY OF LACTIC ACID: CPT

## 2025-06-25 RX ORDER — HYDROCORTISONE 20 MG
25 TABLET ORAL
Refills: 0 | Status: DISCONTINUED | OUTPATIENT
Start: 2025-06-25 | End: 2025-06-26

## 2025-06-25 RX ORDER — SOD PHOS DI, MONO/K PHOS MONO 250 MG
1 TABLET ORAL ONCE
Refills: 0 | Status: COMPLETED | OUTPATIENT
Start: 2025-06-25 | End: 2025-06-25

## 2025-06-25 RX ORDER — IPRATROPIUM BROMIDE AND ALBUTEROL SULFATE .5; 2.5 MG/3ML; MG/3ML
3 SOLUTION RESPIRATORY (INHALATION) EVERY 8 HOURS
Refills: 0 | Status: DISCONTINUED | OUTPATIENT
Start: 2025-06-25 | End: 2025-06-26

## 2025-06-25 RX ADMIN — Medication 4 MILLILITER(S): at 19:27

## 2025-06-25 RX ADMIN — Medication 4 MILLILITER(S): at 07:33

## 2025-06-25 RX ADMIN — Medication 4 MILLILITER(S): at 00:39

## 2025-06-25 RX ADMIN — Medication 25 GRAM(S): at 21:22

## 2025-06-25 RX ADMIN — Medication 25 GRAM(S): at 05:47

## 2025-06-25 RX ADMIN — Medication 1 DOSE(S): at 07:40

## 2025-06-25 RX ADMIN — AMIODARONE HYDROCHLORIDE 100 MILLIGRAM(S): 50 INJECTION, SOLUTION INTRAVENOUS at 05:48

## 2025-06-25 RX ADMIN — IPRATROPIUM BROMIDE AND ALBUTEROL SULFATE 3 MILLILITER(S): .5; 2.5 SOLUTION RESPIRATORY (INHALATION) at 19:27

## 2025-06-25 RX ADMIN — Medication 50 MILLIGRAM(S): at 05:47

## 2025-06-25 RX ADMIN — Medication 25 MILLIGRAM(S): at 18:55

## 2025-06-25 RX ADMIN — Medication 40 MILLIGRAM(S): at 12:08

## 2025-06-25 RX ADMIN — IPRATROPIUM BROMIDE AND ALBUTEROL SULFATE 3 MILLILITER(S): .5; 2.5 SOLUTION RESPIRATORY (INHALATION) at 07:33

## 2025-06-25 RX ADMIN — Medication 25 GRAM(S): at 13:17

## 2025-06-25 RX ADMIN — Medication 1 APPLICATION(S): at 05:56

## 2025-06-25 RX ADMIN — FUROSEMIDE 40 MILLIGRAM(S): 10 INJECTION INTRAMUSCULAR; INTRAVENOUS at 05:47

## 2025-06-25 RX ADMIN — GABAPENTIN 400 MILLIGRAM(S): 400 CAPSULE ORAL at 17:33

## 2025-06-25 RX ADMIN — Medication 40 MILLIEQUIVALENT(S): at 13:16

## 2025-06-25 RX ADMIN — Medication 4 MILLILITER(S): at 13:32

## 2025-06-25 RX ADMIN — Medication 1 TABLET(S): at 12:08

## 2025-06-25 RX ADMIN — GABAPENTIN 400 MILLIGRAM(S): 400 CAPSULE ORAL at 05:47

## 2025-06-25 RX ADMIN — IPRATROPIUM BROMIDE AND ALBUTEROL SULFATE 3 MILLILITER(S): .5; 2.5 SOLUTION RESPIRATORY (INHALATION) at 00:39

## 2025-06-25 RX ADMIN — IPRATROPIUM BROMIDE AND ALBUTEROL SULFATE 3 MILLILITER(S): .5; 2.5 SOLUTION RESPIRATORY (INHALATION) at 13:32

## 2025-06-25 RX ADMIN — Medication 1 DOSE(S): at 19:24

## 2025-06-25 NOTE — PROGRESS NOTE ADULT - SUBJECTIVE AND OBJECTIVE BOX
Patient is a 84y old  Female who presents with a chief complaint of septic shock, PNA (25 Jun 2025 10:32)    INTERVAL HPI/OVERNIGHT EVENTS: Patient was seen and examined. Reports feeling better. sat in the chair for 4 hours. 98% on 5LNC. Used bipap overnight. vitals reviewed.     I&O's Summary    24 Jun 2025 07:01  -  25 Jun 2025 07:00  --------------------------------------------------------  IN: 100 mL / OUT: 1380 mL / NET: -1280 mL    25 Jun 2025 07:01  -  25 Jun 2025 15:22  --------------------------------------------------------  IN: 50 mL / OUT: 0 mL / NET: 50 mL        LABS:                        8.5    5.15  )-----------( 186      ( 25 Jun 2025 05:55 )             26.0     06-25    146[H]  |  99  |  32[H]  ----------------------------<  94  3.2[L]   |  41[H]  |  0.95    Ca    8.7      25 Jun 2025 05:55  Phos  2.4     06-25  Mg     1.6     06-25    TPro  5.8[L]  /  Alb  2.8[L]  /  TBili  0.7  /  DBili  x   /  AST  5[L]  /  ALT  15  /  AlkPhos  56  06-25    PT/INR - ( 24 Jun 2025 05:30 )   PT: 14.7 sec;   INR: 1.26 ratio         PTT - ( 24 Jun 2025 05:30 )  PTT:32.7 sec  Urinalysis Basic - ( 25 Jun 2025 05:55 )    Color: x / Appearance: x / SG: x / pH: x  Gluc: 94 mg/dL / Ketone: x  / Bili: x / Urobili: x   Blood: x / Protein: x / Nitrite: x   Leuk Esterase: x / RBC: x / WBC x   Sq Epi: x / Non Sq Epi: x / Bacteria: x      CAPILLARY BLOOD GLUCOSE      POCT Blood Glucose.: 122 mg/dL (25 Jun 2025 11:35)  POCT Blood Glucose.: 114 mg/dL (25 Jun 2025 07:40)  POCT Blood Glucose.: 154 mg/dL (24 Jun 2025 21:33)  POCT Blood Glucose.: 156 mg/dL (24 Jun 2025 17:07)        Urinalysis Basic - ( 25 Jun 2025 05:55 )    Color: x / Appearance: x / SG: x / pH: x  Gluc: 94 mg/dL / Ketone: x  / Bili: x / Urobili: x   Blood: x / Protein: x / Nitrite: x   Leuk Esterase: x / RBC: x / WBC x   Sq Epi: x / Non Sq Epi: x / Bacteria: x        MEDICATIONS  (STANDING):  albuterol/ipratropium for Nebulization 3 milliLiter(s) Nebulizer every 6 hours  aMIOdarone    Tablet 100 milliGRAM(s) Oral daily  chlorhexidine 2% Cloths 1 Application(s) Topical <User Schedule>  dextrose 5%. 1000 milliLiter(s) (50 mL/Hr) IV Continuous <Continuous>  dextrose 5%. 1000 milliLiter(s) (100 mL/Hr) IV Continuous <Continuous>  dextrose 50% Injectable 25 Gram(s) IV Push once  dextrose 50% Injectable 12.5 Gram(s) IV Push once  dextrose 50% Injectable 25 Gram(s) IV Push once  fluticasone propionate/ salmeterol 500-50 MICROgram(s) Diskus 1 Dose(s) Inhalation two times a day  furosemide   Injectable 40 milliGRAM(s) IV Push daily  gabapentin 400 milliGRAM(s) Oral every 12 hours  glucagon  Injectable 1 milliGRAM(s) IntraMuscular once  hydrocortisone sodium succinate Injectable 25 milliGRAM(s) IV Push two times a day  insulin lispro (ADMELOG) corrective regimen sliding scale   SubCutaneous three times a day before meals  pantoprazole  Injectable 40 milliGRAM(s) IV Push daily  piperacillin/tazobactam IVPB.. 3.375 Gram(s) IV Intermittent every 8 hours  sodium chloride 3%  Inhalation 4 milliLiter(s) Inhalation every 6 hours    MEDICATIONS  (PRN):  acetaminophen     Tablet .. 650 milliGRAM(s) Oral every 6 hours PRN Mild Pain (1 - 3)  dextrose Oral Gel 15 Gram(s) Oral once PRN Blood Glucose LESS THAN 70 milliGRAM(s)/deciliter      REVIEW OF SYSTEMS:  CONSTITUTIONAL: No fever or chills  HEENT:  No headache  RESPIRATORY: No cough, or shortness of breath  CARDIOVASCULAR: No chest pain  GASTROINTESTINAL: No abdominal pain, nausea, vomiting, or diarrhea  GENITOURINARY: No dysuria  NEUROLOGICAL: no focal weakness   MUSCULOSKELETAL: no myalgias  PSYCH: no recent changes in mood    RADIOLOGY & ADDITIONAL TESTS:    Imaging Personally Reviewed:  [x] YES  [ ] NO    Consultant(s) Notes Reviewed:  [x] YES  [ ] NO    PHYSICAL EXAM:  T(C): 36.8 (06-25-25 @ 11:30), Max: 37.1 (06-24-25 @ 20:24)  HR: 80 (06-25-25 @ 14:00) (71 - 96)  BP: 117/70 (06-25-25 @ 11:30) (105/57 - 129/68)  RR: 18 (06-25-25 @ 11:30) (18 - 18)  SpO2: 95% (06-25-25 @ 11:30) (91% - 97%)    GENERAL: awake, oriented   HEENT:  anicteric, moist mucous membranes  CHEST/LUNG:  CTA b/l, no rales, wheezes, or rhonchi  HEART:  irregular   ABDOMEN:  BS+, soft, nontender, nondistended  EXTREMITIES: no edema  NERVOUS SYSTEM: answers questions and follows commands appropriately  PSYCH: normal affect  + right chest port  + ecchymosis noted on both arms.      Care Discussed with Consultants/Other Providers [x] YES  [ ] NO

## 2025-06-25 NOTE — PROGRESS NOTE ADULT - ASSESSMENT
REASON FOR VISIT  .. Management of problems listed below      EVENTS/CURRENT ISSUES.  . 6/24/2025 tr out of icu   . 6/23/2025 tr icu  . 6/22/2025 worsening gas exchange placed on hfnc apixa stoppd   . 6/20/2025 apixa restartd and noc bpap orderde   . 6/16/2025 fob done oozing rul and l lo lobe post basal   . 6/14/2025 iv ufh dced   . 6/13/2025 continues to have mild hemoptysis but is also on iv ufh drip   . 6/13/2025 dw pt re rbaa of bronch and se agrees scheduled for 6/16 10 in or   . 6/13/2025 dw cardio and id   . 6/11/2025 qft funfitell and galactomannan orderd   . 6/11/2025 pt wa sstarted on iv ufh for hemoptysis and pleuritic chest pain but was stopped when vq showed vlp   . 6/10/2025 Mild hemoptysis   . 6/9 tr out of icu  . 6/8/2025  . PNEUMONIA RML 6/8/2025  . SHOCK 6/8/2025   . NOREPI 6/8/2025   . STRESS STEROIDS   .... HYDROCORTISONE 6/8/2025  . A fib (chronic) POA 6/8/2025  . ANEMIA Hb 6/8/2025 Hb 7.5  . KIMBERLY ON CKD Cr 6/8/2025 Cr 1.9        REVIEW OF SYMPTOMS   Able to give ROS  Yes     RELIABILITY +/-   CONSTITUTIONAL Weakness Yes    ENDOCRINE  No heat or cold intolerance    ALLERGY No hives  Sore throat No stridor  RESP Shortness of breath YES   NEURO New weakness No   CARDIAC   Palpitations No         PHYSICAL EXAM    HEENT Unremarkable  atraumatic   RESP Fair air entry  Harsh breath sound   CARDIAC S1 S2 No S3     NO JVD    ABDOMEN No hepatosplenomegaly   PEDAL EDEMA present No calf tenderness    REASON FOR VISIT  .. Management of problems listed below      CC.   . 6/8/2025 brought in by ems for right sided chest pain that started at 3am- nonradiating- patient was offered aspirin by ems- patient refused and stated to ems that she is on plavix and was advised not to take aspirin. patient on 43 litres nasl cannula-     OVERALL PRESENTATION.  . 6/8/2025 84 yr old female with PMHx afib on eliquis, COPD (not on home O2), PE/Rt lower extremity DVT 2017, Rt LE IVC filter 2017 CKD3, aplastic anemia, polymyalgia rheumatica on prednisone 5 mg po qd, requiring PRBC transfusions ~8 weeks (via Rt subclavian mediport), AVN bilat hips, HTN, HLD, HFpEF (75% 6.2.25), diastolic dysfx grade1, recent hospitalization 5/25/25-6/5/25 for ADHF/COPD exacerbation, Pt with eventual improvement and transfer to [facility] facility from where she presents to E.D. this a.m. 6/8/25 with c/o Rt sided chest pain. general malaise. Pt stated began to feel ill evening before presentation, daughter this a.m. endorsed pt lethargic, pale.     In E.D. Pt found to have fever with tmax of 101, KIMBERLY on CKD with sCr 1.90 (baseline 1.2-1.6), HYPOtnsive with SBP 80's (pt on midodrine therapy, usual 's). In addition found to have leukocytosis of 37.6 (baseline 5.25 6/5/25), procalcitonin of 13.6, Hgb 7.5, elevated INR 2.27,  Chest xray demonstrated RML consolidations, concerning for pneum. In E.D. pt receiving 500 cc's NS, Vanco 1 gm, zosyn. Pt received tylenol 1 gm IV x1.     PMH.        BEST PRACTICE ISSUES.  . HOB ELEVATN.    .... Yes  . DIET  .   .... DASH 6/8/2025   . FREE WATER.   ....   .  IV FLUID.  .... 6/15-6/20/2025 1/2 ns 50   ..... 6/8 lr 40 x 12 h   . PHARMAC DVT PPLX .    .... 6/22 apixa dced suspect hemoptysis   .... 6/20 apixa 2.5 x 2   .... 6/12-6/14/2025 iv ufh (hospitalist)  .... 6/11/2025 [facility] 5k.2   .... 6/10/2025 4:46 PM iv ufh   .... [facility] 6/9-6/10/2025   . NON PHARMAC DVT PPLX .    .... 6/23/2025 compr device   . STRESS ULCR PPLX .   .... PROTONIX 40 6/22/2025   . DATE/DM MGMT.   ..... See under Endocrine section   GENERAL DATA .   . COVID.         .... scv2 6/8/2025 scv2 (-)   . GOC.    ....    . ICU STAY.   .... 6/22 -    .... 6/8-6/9   . INFECTION PPLX .   .... CHLORHEXIDINE 2% 6/8/2025   . ALLGY.   ....  nka  . WT.   .... 6/8/2025 69   . BMI.  ....6/8/2025 26    [id number]  VITALS/GAS EXCHANGE/DRIPS    ABGS.   6/20/2025 4p 5l 736/71/65   6/22/2025 7p bpap 16/8/1 731/82/153   6/23/2025 11p bpap 12/5/.4 727/92/57  6/23/2025 2a avaps 450/18/8/25/10 .45 740/67/85   .  VS/ PO/IO/ VENT/ DRIPS.  6/25/2025 afeb 80 117/70   6/25/2025 5l 95%     XXXXXXXXXXXXXXXXXXXXXXXXXXXXXXXXXXX  PROBLEM ASSESSMENT RECOMMENDATIONS.  RESP.   . GAS EXCHANGE .   .... target PO 90-95%     . NEED FOR HOME OXYGEN   .... 6/18/2025 will need home oxygen if at discharge pulse ox at rest or on exertion is below 88%     . COPD   .... ADVAIR 500 6/8/2025   .... MONTELUKAST 10 6/8/2025   .... SPIRIVA 6/8/2025   .... nacl 3% bid 6/22/2025   .... HYDROCORT   ........ 50.2 6/24/2025  ....... 25.2 6/25/2025      . RESP FAILURE  .... 6/8/2025 Has ho hypercapnia   .... 6/8/2025 recommend monitor abg  .... 6/18/2025 requiring 4l nc     . HYPERCAPNIC RESP FAILURE WITHOUT ACIDEMIA   6/20/2025 4p 5l 736/71/65   .... 6/20/2025 sterted noct bpap 35/15/6/16  .... 6/23 noct avaps .45 14 epap 8 vt 450 25/10     . MILD HEMOPTYSIS 6/10/2025   .... ct ch 6/10/2025 cw 5/31  ........ new mf infection in rul   ........ unchanged small r greater than left pl effsn   ....... enlarged pulm artery suggesting pulm hytn   .... 6/10/2025  dw hemat cardio id and instead of restarting apixa will start iv UFH which can be easily reversed in case Hb starts dropping but will be the rx for venous thromboembolism as well as for a fib   .... v duplx 6/10 (-)  .... vq 6/10 vlp   .... 6/13/2025 continues to have mild hemoptysis but is also on iv ufh drip   . 6/13/2025 dw pt re rbaa of bronch and se agrees scheduled for 6/16 10 in or   .... 6/13/2025 dw cardio and id   .... 6/16 fob done showed ooze rul and l lo lobe no eb lesions   .... 6/17/2025 continues to have mild hemoptysis   .... 6/17/2025 30% of hemoptysis cases no cause is found  Ordered gbm ab rf dsdna rf apla chapman   .... 6/18/2025 cbmab dsdna inderjit apla ab all (-)   .... 6/12 fungitell galactomannan (-)   .... strep legn ag 6/9 (-)   .... fob 6/16 afb sm (-) cyto (-)  .... 6/18/2025 hemoptysis likely sec to pneumonia in setting of pulm hypertension   .... 6/20/2025 apixa restarted   .... 6/22/2025 apixa dced    . PLEURITIC CHEST PAIN RO VTE   .... v duplx 6/10 (-)  .... vq 6/10 vlp   .... 6/12/2025 iv ufh was stopped 6/11 as vq vlp but was restarted by hospitalist 6/12/2025 for af   .... chest pain resolvd       INFECTION.  . DATA  .... w 6/22-6/23-6/24/2025 w 7.2 - 7.8 - 4.6   .... w 6/8-6/10-6/13-6/14-6/15-6/17-6/18-6/19-6/20/2025   .... w 13.6 - 9.2 - 15.9 - 18- 14 - 9.6 - 11.9- 9.8 - 8.7   .... esr 6/8/2025 esr 17   .... w 6/8-6/9/2025 w 32 - 24   .... ua 6/8/2025 w 4   .... cxr 6/8/2025 cxr dense infiltra r upper hilum r mediport  .... ct ch 6/10 cw 5/31  ........ r greater than l effs   ........ partial rll atelectasis   ........ new patchy and nodular areas of consolidation rul and associated ground glass opacities   ........ ground glass and nodular opac also scott   ........ numerous tib rml and rll     . MICROBIO  .... sp 6/8 n commensals   .... flu ab 6/8/2025 (-)   .... rsv 6/8/2025 (-)    .... mrsa 6/9/2025 (-)  .... uc 6/8 (-)  .... bc 6/8 (-)     . PNEUMONIA RUL 6/8/2025   .... AZITHRO 6/8-6/11 /2025   .... ZOSYN 6/8-6/15/2025     . PNEUMONIA   .... ct ch 6/22/2025 cw 6/10/2025   ....... incr consolidation and ggo rul   ....... new large consolidatn scott and l lo lobe   .... 6/22/2025 zosyn     CARDIAC.  . PAIN CHEST   .... 6/10/2025 co pain chest r   .... 6/10/2025 check ct to see if she has pleurisy   .... 6/10/2025  dw hemat cardio id and instead of restarting apixa will start iv UFH which can be easily reversed in case Hb starts dropping but will be the rx for venous thromboembolism as well as for a fib     . STRESS STEROIDS   .... HYDROCORTISONE   ........ 6/8/2025 h 50.4  ........ 6/10/2025 h 50.2   ........ 6/12/2025 hydrocort 50   ....... 6/14/2025 h 25  ........ 6/17/2025 dced     . SHOCK   .... MIDODRINE 6/8-6/16/2025 m 10.3   .... DROXIDOPA 6/9-6/16/2025 d 100.3   .... NOREPI 6/8-6/9/2025 n 8/250   .... target map 65 (+)     . CAD    .... Trop 6/8-6/9/2025 Tr 32- 24     . LACTICEMIA   .... la 6/8/2025 la 1.4     . CHF  .... pbnp 6/8/2025 pbnp 6599   .... tte 4/2025 mild ph  n vf  .... tte 6/2/2025   ........ ef 71%   ........ n rvsf    ........ pasp 54   ....... la mod dilatd   .... lasix 40/d 6/22/2025  .... torsemide 20 6/17-6/22/2025  .... lasix 40 once 6/14/2025  .... 6/22/2025 lasix 20 once     . A fib (chronic) POA 6/8/2025  .... AMIODARONE 100 6/8/2025  .... 6/10/2025  dw hemat cardio id and instead of restarting apixa will start iv UFH which can be easily reversed in case Hb starts dropping but will be the rx for venous thromboembolism as well as for a fib   .... 6/12-6/14/2025 iv ufh     HEMAT.  . DATA  .... Plt 6/8/2025 plt 153   .... inr 6/8-6/9/2025 inr 2.27- 1.63      . ANEMIA   .... Hb 6/19-6/20-6/21-6/22-6/23-6/24/2025   .... Hb 7.8 - 9.2 - 9.3 - 8.3 - 9.2 - 8.4   .... Hb 6/8-6/9-6/10-6/11-6/13-6/14-6/15-6/17-6/18/2025   .... Hb 7.5- 7.8 - 7.6- 7.1 - 8.4 - 7.3- 9.1 - 8.1 - 8.5    . TRANSFUSION  .... 6/8  1 u prbc    .... 6/14 2 u prbc   .... 6/22/2025 1 u prbc     GI.   . DIET .   .... dash 6/8/2025     . LFT MONITORING   .... LFTS    6/8/2025  ........ AP     39  ........ AST   11  ........ ALT    35    RENAL.  . DATA  .... Na 6/8/2025 Na 137  .... K 6/8/2025 K 4   .... CO2 6/8/2025 co2 29   .... ag 6/8/2025 ag 9  .... alb 6/8/2025 alb 3.4     . KIMBERLY ON CKD   .... Cr 6/8-6/9-6/10-6/11-6/13-6/14/2025   .... Cr 1.9 - 1.4 - 1.3 - 1.3 - 1.1 - 1  .... Cr 5/27-5/28-5/29-5/30-6/1/2025   .... Cr 1.2 - 1.3 - 1.3 - 1.6 - 1.6    . HYPERNATREMIA   .... Na 6/24/2025 Na 147    ENDO.  . DM  .  .... 6/8/2025 SL SCALE     NEURO.  . PAIN  .... GABAPENTIN 6/8/2025 g 400.2       [id number]   SUMMARY BASELINE .     84 yr old female pt of Dr Gallegos not on home ox or home cpap used to use trelegy lives with spouse quit 40 y 1/2 ppd with PMHx afib on eliquis, COPD (not on home O2), PE/Rt lower extremity DVT 2017, Rt LE IVC filter 2017 CKD3, aplastic anemia, polymyalgia rheumatica on prednisone 5 mg po qd, requiring PRBC transfusions around 8 weeks (via Rt subclavian mediport), AVN bilat hips, HTN, HLD, HFpEF (75% 6.2.25), diastolic dysfx grade1, recent hospitalization 5/25/25-6/5/25 for ADHF/COPD exacerbation, Pt with eventual improvement and transfer to [facility] facility   CC.   . 6/8/2025 R CHEST PAIN   MAIN ISSUES.  . COPD 6/23/2025 hydrocort 50.3   . HYPERCAPNIC RESP FAILURE: 6/20/2025 4p 5l 736/71/65 6/23/2025 2a avaps 450/18/8/25/10 .45 740/67/85   .... 6/20/2025 Noct bpap 15/5/35/16 ordrd 6/23 noct avaps .45 14 epap 8 vt 450 25/10   . RESP FAILURE 6/22/2025  HFNC 6/22 5p HFNC 80% 50l po 95% tr icu   . MILD HEMOPTYSOIS 6/10/2025 6/22 apixa stoppd   . SUSPECTED BLEEDING IN [place] CAUSED RESP DECOMPENSATION AND NEW OPACITIES ON 6/22 CT  6/22 622 apixa stoppd   . PNEUMONIA RML 6/8/2025:  ZOSYN 6/8-6/16/2025   . PNEUMONIA: 6/22 ct bl ul opac 6/22/2025 zosyn   . PLEURITIS CHEST PAIN 6/10/2025: 6/10 vte excluded vlp vq   . SHOCK 6/8/2025: tr oo icu 6/9   . NOREPI 6/8-6/9/2025   . STRESS STEROIDS : HYDROCORTISONE 6/8-6/17/2025  . A fib (chronic) POA 6/8/20256/22/2025 6/22 apixa stopped   . CHF: tte 6/2/2025  ef 71%  pasp 54  la mod dilatd 6/22 lasix 40   . ANEMIA Hb 6/8-6/13-6/25/2025 Hb 7.5- 8.4- 8.5  . TRANSFUSION 6/8  1 u prbc  6/22 1 u prbc   . KIMBERLY ON CKD Cr 6/8-6/13/2025 Cr 1.9- 1.1  . HYPERNATREMIA 6/24-6/25/2025 Na 147-146    PMH.   . AFon Eliquis,   . COPD (not on home o2),   . CKD,   . aplastic anemia,   . TRANSFUSION 6/2/2025 1 u prbc   . PMR (chronic prednisone with AVN hip),   . HLD,   . HTN,   . DM    PROCEDURES/DEVICES .  . 6/16/2025 FOB br wash rul ooze rul l lo lobe post basal     PAST PROCEDURES   . r mediport poa 6/8/2025     DISCUSSIONS.  .... Discussed with primary care and relevant consultants on an ongoing basis       TIME SPENT.  . Over 36 minutes aggregate care time spent on encounter; activities included   direct patient care, counseling and/or coordinating care reviewing notes, lab data/ imaging , discussion with multidisciplinary team/ patient  /family and explaining in detail risks, benefits, alternatives  of the recommendations     ROBERT HENRIQUEZ 84 6/8/2025 1942

## 2025-06-25 NOTE — PROGRESS NOTE ADULT - ASSESSMENT
84 female PMH of A-fib on Eliquis, COPD, CKD, aplastic anemia, polymyalgia rheumatica, on chronic steroids, avascular necrosis of hips, HLD, HTN, DM, recent admission to [place] 5/26 through 6/5/2025 for CHF/fluid overload/COPD exacerbation, presents to the ED form [facility] for evaluation of fever and generalized feeling of being unwell, found to be septic and hypotensive.     - CT chest 6/10 with multifocal R sided PNA  - hx copd on home o2 , baseline o2 keep > 88%   - S/p bronch, 6/16.  Showed blood oozing from RUL but no lesion.  Still with minimal hemoptysis  - Follow pulmonary and ID recommendations     - on 6/22 had AMS from CO2 narcosis.   - now of of bipap  - compliance with cpap recommended  - monitor ABGs closely     - c/o atypical CP Appears pleuritic  - EKG x 2 showed NSR with PAC, non-ischemic.    - Troponin negative    - Echo 6/12/25 as above EF 67% mild LVH, mod MAC, mild MR, mod to severe pHTN  - No evidence of any meaningful volume overload   - Continue IV Lasix 40 daily to maintain neg balance  - May switch to home torsemide in am     - Bp remains stable    - Now off Midodrine and Northera  - known hx of dysautonomia monitor bps closely    - Hx of paroxysmal atrial fibrillation and DVT/PE  - Restarted on ac with Eliquis at 2.5 bid, but now off 2/2 hemoptysis.   - Resume when ok with pulm   - TELE: SR/ A fib/PAT PVC 70-100s nonsustained 130s, 3 beats NSVT  - Can give IV BB if sustains  - Continue Amiodarone 100 mg daily    - Monitor and replete lytes, keep K>4, Mg>2.  - Will continue to follow.    Oliver Olvera, MS FNP, AGACNP  Nurse Practitioner- Cardiology   Please call on TEAMS   84 female PMH of A-fib on Eliquis, COPD, CKD, aplastic anemia, polymyalgia rheumatica, on chronic steroids, avascular necrosis of hips, HLD, HTN, DM, recent admission to [place] 5/26 through 6/5/2025 for CHF/fluid overload/COPD exacerbation, presents to the ED form [facility] for evaluation of fever and generalized feeling of being unwell, found to be septic and hypotensive.     - CT chest 6/10 with multifocal R sided PNA  - hx copd on home o2 , baseline o2 keep > 88%   - S/p bronch, 6/16.  Showed blood oozing from RUL but no lesion.  Still with minimal hemoptysis  - Follow pulmonary and ID recommendations     - on 6/22 had AMS from CO2 narcosis.   - now of of bipap  - compliance with cpap recommended  - monitor ABGs closely     - c/o atypical CP Appears pleuritic  - EKG x 2 showed NSR with PAC, non-ischemic.    - Troponin negative    - Echo 6/12/25 as above EF 67% mild LVH, mod MAC, mild MR, mod to severe pHTN  - No evidence of any meaningful volume overload   - Would hold IV lasix, hypernatremic   - May switch to home torsemide in am pending repeat BMP    - Bp remains stable    - Now off Midodrine and Northera  - known hx of dysautonomia monitor bps closely    - Hx of paroxysmal atrial fibrillation and DVT/PE  - Restarted on ac with Eliquis at 2.5 bid, but now off 2/2 hemoptysis.   - Resume when ok with pulm   - TELE: SR/ A fib/PAT PVC 70-100s nonsustained 130s, 3 beats NSVT  - Can give IV BB if sustains  - Continue Amiodarone 100 mg daily    - Monitor and replete lytes, keep K>4, Mg>2.  - Will continue to follow.    Oliver Olvera, MS FNP, AGACNP  Nurse Practitioner- Cardiology   Please call on TEAMS

## 2025-06-25 NOTE — PROGRESS NOTE ADULT - ASSESSMENT
84F h/o AFib on Eliquis, COPD, CKD, aplastic anemia, polymyalgia rheumatica on chronic steroids, avascular necrosis of hips, HTN, HLD, with recent admission to [place] 5/26 - 6/5 for acute HFpEF exacerbation and COPD exacerbation, discharged to rehab on 6/5 and returning today with right sided chest pain, general malaise and feeling unwell. She reports some slight cough though otherwise no other new symptoms reported. Found to be hypotensive, febrile w/ leukocytosis. Admitted to ICU for septic shock likely 2/2 to PNA and KIMBERLY. Now stable for downgrade to tele.     Septic shock secondary to PNA   hemoptysis     - s/p bronch with blood in the RUL. AC held.     - weaned off droxidopa/midodrine. Weaning steroids as tolerated. will decrease to solucortef 25mg BID.     - on zosyn TID, last dose 6/29.     - Pulm/ID following     Aplastic anemia    - eliquis held. patient had recurrent hemoptysis with evidence of bleeding on bronch. s/p prbc on 6/23.     KIMBERLY/CKD3, prerenal azotemia     - nephrology following     - potassium supplemented     COPD  hypercapnic respiratory failure    - pulm following    - wean NC as tolerated. decreased to 4LNC. wean to keep pulse ox >88%     - cont advair, duoneb, hypertonic saline QID     afib     - cardio following.     - cont amiodarone     - on IV lasix 40mg daily. will hold given hypernatremia     PMR    - on chronic prednisone 5mg daily at home. wean stress dose to home dose when able.     HLD    - cont statin     DVT proph: SCDs given hemoptysis  Patient declined offer to update family at this time.

## 2025-06-25 NOTE — PROGRESS NOTE ADULT - SUBJECTIVE AND OBJECTIVE BOX
[facility]  INFECTIOUS DISEASES   [address] [place] [address]  Tel: [phone]     Fax: [phone]  ======================================================  MD Emma Archer Kaushal, MD Cho, Michelle, MD   ======================================================    Assessment/Recommendations:   84-year-old female admitted on June 8 with right-sided chest pain generalized malaise lethargy  Past medical history: afib on eliquis, COPD (not on home O2), PE/Rt lower extremity DVT 2017, Rt LE IVC filter 2017 CKD3, aplastic anemia, polymyalgia rheumatica on prednisone 5 mg po qd, requiring PRBC transfusions ~8 weeks (via Rt subclavian mediport), AVN bilat hips, HTN, HLD, HFpEF (75% 6.2.25), diastolic dysfx grade1, recent hospitalization 5/25/25-6/5/25 for AHDF/COPD exacerbation, IVC filter right subclavian Mediport  Admitted for  Acute hypoxic hypercapnic respiratory failure  Hemoptysis  Multifocal pneumonia  Anemia      Patient Seen and examined   WBC improved/Normal, afebrile, hemodynamically stable  Trend WBC and temperature curve through hospital course  Overall improved oxygen requirement, nocturnal noninvasive ventilator    Cultures:  Blood culture: June 8: 2 sets: Negative to date  RSV; Influenza A, B; SARS CoV-2 Viral PCR: Negative on admission   Urine culture: June 8: Negative to date  Sputum culture: June 8: Negative to date   bronchoscopy culture: June 16: Negative to date, Legionella negative as well, PJP PCR negative  Sputum culture: June 22: Negative to date   MRSA PCR: Negative: June 23   Legionella urinary antigen negative from June 9 and 24 as well as bronchoscopy  Streptococcal urinary antigen negative from June 9 and 24    Imaging:    CT Chest No Cont (06.22.25 @ 16:10) 6/10/2025, Increased consolidation and ground-glass   opacity within right upper lobe. New large consolidations within left upper lobe and left lower lobe. Small pleural effusions are increased.    Antibiotics:    Zosyn 3.375 gm IV q8hour as extended infusion over 4 hours   Can continue with IV antibiotics for now  Last day of antibiotics: June 29  If plan to discharge prior to that can transition to Augmentin 500/125 mg p.o. 3 times daily  Sputum clearing up with no consistent hemoptysis now  Management of anticoagulation as per primary team  Rest of the medical management as per primary team      Daily CBC, CMP, Mg, PO4 while on Antibiotics     Plan explained to patient   D/w Primary team     ________________________________    Last 24 hours:    difficulty breathing cough phlegm production overall improving  Denies fever chills nausea vomiting diarrhea dysuria frequency rash    Further ROS:  All systems reviewed. No other complaints besides mentioned above     ______________________________  Allergies    No Known Allergies         medications:  piperacillin/tazobactam IVPB.. 3.375 Gram(s) IV Intermittent every 8 hours  acetaminophen     Tablet .. 650 milliGRAM(s) Oral every 6 hours PRN  albuterol/ipratropium for Nebulization 3 milliLiter(s) Nebulizer every 6 hours  aMIOdarone    Tablet 100 milliGRAM(s) Oral daily  chlorhexidine 2% Cloths 1 Application(s) Topical <User Schedule>  dextrose 5%. 1000 milliLiter(s) IV Continuous <Continuous>  dextrose 5%. 1000 milliLiter(s) IV Continuous <Continuous>  dextrose 50% Injectable 25 Gram(s) IV Push once  dextrose 50% Injectable 12.5 Gram(s) IV Push once  dextrose 50% Injectable 25 Gram(s) IV Push once  dextrose Oral Gel 15 Gram(s) Oral once PRN  fluticasone propionate/ salmeterol 500-50 MICROgram(s) Diskus 1 Dose(s) Inhalation two times a day  furosemide   Injectable 40 milliGRAM(s) IV Push daily  gabapentin 400 milliGRAM(s) Oral every 12 hours  glucagon  Injectable 1 milliGRAM(s) IntraMuscular once  hydrocortisone sodium succinate Injectable 50 milliGRAM(s) IV Push two times a day  insulin lispro (ADMELOG) corrective regimen sliding scale   SubCutaneous three times a day before meals  pantoprazole  Injectable 40 milliGRAM(s) IV Push daily  potassium chloride    Tablet ER 40 milliEquivalent(s) Oral once  potassium phosphate / sodium phosphate Tablet (K-PHOS No. 2) 1 Tablet(s) Oral once  sodium chloride 3%  Inhalation 4 milliLiter(s) Inhalation every 6 hours      _________________    PHYSICAL EXAM:    Objective:  Vital Signs Last 24 Hrs  T(C): 36.4 (25 Jun 2025 05:15), Max: 37.7 (24 Jun 2025 11:00)  T(F): 97.6 (25 Jun 2025 05:15), Max: 99.9 (24 Jun 2025 11:00)  HR: 76 (25 Jun 2025 08:09) (71 - 101)  BP: 110/64 (25 Jun 2025 05:15) (105/57 - 138/77)  BP(mean): 76 (24 Jun 2025 14:00) (76 - 102)  RR: 18 (25 Jun 2025 05:15) (18 - 22)  SpO2: 94% (25 Jun 2025 06:32) (91% - 100%)    Parameters below as of 25 Jun 2025 06:32  Patient On (Oxygen Delivery Method): nasal cannula  O2 Flow (L/min): 5      General: No acute distress.  Lying in bed comfortably   Neuro: AAO*3, No motor, sensory, or cranial nerve deficit  HEENT: Pupils equal, reactive to light, Oral mucosa moist  PULM: Clear to auscultation bilaterally Except rhonchi and crackles on both right and left upper zone posteriorly, no significant adventitious breath sounds ; right chest Mediport without surrounding cellulitic changes  CVS: Regular rhythm and controlled rate, no murmurs, rubs, or gallops  ABD: Soft, nondistended, nontender, normoactive bowel sounds, no CVA tenderness  EXT: No b/l LE edema, nontender with pedal pulse palpable ; left forearm and hand bruising with multiple IV attempts  SKIN: Warm and well perfused, no acute rashes   ______________  LABS, MICROBIOLOGY, RADIOLOGY & ADDITIONAL STUDIES: Reviewed

## 2025-06-25 NOTE — PROGRESS NOTE ADULT - ASSESSMENT
Prerenal azotemia, CKD 3  Dyspnea: Pneumonia, h/o COPD/CHF  s/p Shock   Diabetes  Aplastic anemia, requiring frequent blood transfusions  Hemoptysis    Stable renal indices. Potassium and phosphorous supplementation. To continue current meds. Monitor blood sugar levels. Insulin coverage as needed.   Trend BP and titrate BP meds as needed. Avoid nephrotoxic meds as possible. Pulmonary follow up. Will follow electrolytes and renal function trend.

## 2025-06-25 NOTE — PROGRESS NOTE ADULT - SUBJECTIVE AND OBJECTIVE BOX
[facility]                                                       Dr. Chisholm, Dr. Franklin, Dr. Baron, Dr. Clark, Dr. Stock                                      [facility], [facility], [address]                                                 [address] [place] [address]                                      Ph: [phone]  Fax: [phone]                                         Ph: [phone]  Fax: [phone]      Patient is a 84y old  Female who presents with a chief complaint of septic shock, PNA (09 Jun 2025 12:36)  Patient seen in follow up for KIMBERLY on CKD.        PAST MEDICAL HISTORY:  COPD (chronic obstructive pulmonary disease)    DM (diabetes mellitus)    Pulmonary fibrosis    PAF (paroxysmal atrial fibrillation)    Hiatal hernia    GIB (gastrointestinal bleeding)    Pericarditis    Shoulder fracture, right    Hematoma    H/O aplastic anemia    History of IBS    H/O osteoporosis    HLD (hyperlipidemia)    PMR (polymyalgia rheumatica)    History of basal cell cancer    Chronic kidney disease (CKD)    Hypotension    Presence of IVC filter    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    History of immunodeficiency    Lumbar compression fracture    H/O hiatal hernia    H/O pulmonary fibrosis    H/O sinus bradycardia    History of fracture of right hip      MEDICATIONS  (STANDING):  albuterol/ipratropium for Nebulization 3 milliLiter(s) Nebulizer every 6 hours  aMIOdarone    Tablet 100 milliGRAM(s) Oral daily  chlorhexidine 2% Cloths 1 Application(s) Topical <User Schedule>  dextrose 5%. 1000 milliLiter(s) (50 mL/Hr) IV Continuous <Continuous>  dextrose 5%. 1000 milliLiter(s) (100 mL/Hr) IV Continuous <Continuous>  dextrose 50% Injectable 25 Gram(s) IV Push once  dextrose 50% Injectable 12.5 Gram(s) IV Push once  dextrose 50% Injectable 25 Gram(s) IV Push once  fluticasone propionate/ salmeterol 500-50 MICROgram(s) Diskus 1 Dose(s) Inhalation two times a day  furosemide   Injectable 40 milliGRAM(s) IV Push daily  gabapentin 400 milliGRAM(s) Oral every 12 hours  glucagon  Injectable 1 milliGRAM(s) IntraMuscular once  hydrocortisone sodium succinate Injectable 50 milliGRAM(s) IV Push two times a day  insulin lispro (ADMELOG) corrective regimen sliding scale   SubCutaneous three times a day before meals  pantoprazole  Injectable 40 milliGRAM(s) IV Push daily  piperacillin/tazobactam IVPB.. 3.375 Gram(s) IV Intermittent every 8 hours  potassium chloride    Tablet ER 40 milliEquivalent(s) Oral once  potassium phosphate / sodium phosphate Tablet (K-PHOS No. 2) 1 Tablet(s) Oral once  sodium chloride 3%  Inhalation 4 milliLiter(s) Inhalation every 6 hours    MEDICATIONS  (PRN):  acetaminophen     Tablet .. 650 milliGRAM(s) Oral every 6 hours PRN Mild Pain (1 - 3)  dextrose Oral Gel 15 Gram(s) Oral once PRN Blood Glucose LESS THAN 70 milliGRAM(s)/deciliter    T(C): 36.4 (06-25-25 @ 05:15), Max: 37.7 (06-24-25 @ 10:00)  HR: 76 (06-25-25 @ 08:09) (71 - 990)  BP: 110/64 (06-25-25 @ 05:15) (94/73 - 151/81)  RR: 18 (06-25-25 @ 05:15)  SpO2: 94% (06-25-25 @ 06:32)  Wt(kg): --  I&O's Detail    24 Jun 2025 07:01  -  25 Jun 2025 07:00  --------------------------------------------------------  IN:    IV PiggyBack: 75 mL  Total IN: 75 mL    OUT:    Voided (mL): 1380 mL  Total OUT: 1380 mL    Total NET: -1305 mL                            PHYSICAL EXAM:  General: No distress  Respiratory: b/l air entry  Cardiovascular: S1 S2  Gastrointestinal: soft  Extremities:  edema          LABORATORY:                        8.5    5.15  )-----------( 186      ( 25 Jun 2025 05:55 )             26.0     06-25    146[H]  |  99  |  32[H]  ----------------------------<  94  3.2[L]   |  41[H]  |  0.95    Ca    8.7      25 Jun 2025 05:55  Phos  2.4     06-25  Mg     1.6     06-25    TPro  5.8[L]  /  Alb  2.8[L]  /  TBili  0.7  /  DBili  x   /  AST  5[L]  /  ALT  15  /  AlkPhos  56  06-25    Sodium: 146 mmol/L (06-25 @ 05:55)  Sodium: 147 mmol/L (06-24 @ 05:30)    Potassium: 3.2 mmol/L (06-25 @ 05:55)  Potassium: 3.8 mmol/L (06-24 @ 05:30)    Hemoglobin: 8.5 g/dL (06-25 @ 05:55)  Hemoglobin: 8.4 g/dL (06-24 @ 05:30)  Hemoglobin: 9.2 g/dL (06-23 @ 16:45)  Hemoglobin: 7.2 g/dL (06-23 @ 05:20)    Creatinine, Serum 0.95 (06-25 @ 05:55)  Creatinine, Serum 0.95 (06-24 @ 05:30)  Creatinine, Serum 1.00 (06-23 @ 05:20)        LIVER FUNCTIONS - ( 25 Jun 2025 05:55 )  Alb: 2.8 g/dL / Pro: 5.8 g/dL / ALK PHOS: 56 U/L / ALT: 15 U/L / AST: 5 U/L / GGT: x           Urinalysis Basic - ( 25 Jun 2025 05:55 )    Color: x / Appearance: x / SG: x / pH: x  Gluc: 94 mg/dL / Ketone: x  / Bili: x / Urobili: x   Blood: x / Protein: x / Nitrite: x   Leuk Esterase: x / RBC: x / WBC x   Sq Epi: x / Non Sq Epi: x / Bacteria: x

## 2025-06-25 NOTE — PROGRESS NOTE ADULT - SUBJECTIVE AND OBJECTIVE BOX
CHIEF COMPLAINT/ REASON FOR VISIT  .. Patient was seen to address the  issue listed under PROBLEM LIST which is located toward bottom of this note     MARTINEZ PATEL 1EAS 106 W[address]    Allergies    No Known Allergies    Intolerances        PAST MEDICAL & SURGICAL HISTORY:  COPD (chronic obstructive pulmonary disease)      DM (diabetes mellitus)  diet-controlled      PAF (paroxysmal atrial fibrillation)  s/p ablation      Hiatal hernia      H/O aplastic anemia      History of IBS      H/O osteoporosis      HLD (hyperlipidemia)      PMR (polymyalgia rheumatica)      History of basal cell cancer      Chronic kidney disease (CKD)      Hypotension      Presence of IVC filter      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      History of immunodeficiency      Lumbar compression fracture      H/O hiatal hernia      H/O pulmonary fibrosis      H/O sinus bradycardia      History of fracture of right hip  "unoperable"      S/P hysterectomy      S/P foot surgery      S/P knee surgery      After cataract  bilateral eye cataract surgically removed      Closed right hip fracture  rigth hip ORIF july 19 2016      S/P IVC filter  nov 2016      S/P carpal tunnel release  Right 2008 & 6/27/17      H/O kyphoplasty      Port-A-Cath in place  right chest          FAMILY HISTORY:  Family history of bladder cancer (Mother)    Family history of myocardial infarction (Father)    FH: atrial fibrillation (Father)        Home Medications:  amiodarone 200 mg oral tablet: 0.5 tab(s) orally once a day (08 Jun 2025 16:43)  Bentyl 10 mg oral capsule: 1 cap(s) orally 2 times a day, As Needed (08 Jun 2025 16:43)  Eliquis 2.5 mg oral tablet: 1 tab(s) orally 2 times a day (08 Jun 2025 16:43)  fluticasone-salmeterol 500 mcg-50 mcg/inh inhalation powder: 1 puff(s) inhaled 2 times a day (08 Jun 2025 16:43)  folic acid 1 mg oral tablet: 1 tab(s) orally once a day (in the morning) (08 Jun 2025 16:43)  gabapentin 400 mg oral capsule: 1 cap(s) orally 2 times a day (08 Jun 2025 16:43)  ipratropium-albuterol 0.5 mg-2.5 mg/3 mL inhalation solution: 3 milliliter(s) inhaled every 6 hours As needed Shortness of Breath and/or Wheezing (08 Jun 2025 16:43)  IVIG monthly:  (08 Jun 2025 16:43)  leflunomide 10 mg oral tablet: 1 tab(s) orally once a day (08 Jun 2025 16:43)  midodrine 10 mg oral tablet: 1 tab(s) orally every 8 hours (08 Jun 2025 16:43)  montelukast 10 mg oral tablet: 1 tab(s) orally once a day (08 Jun 2025 16:43)  pantoprazole 40 mg oral delayed release tablet: 1 tab(s) orally once a day (before a meal) (08 Jun 2025 16:43)  predniSONE 5 mg oral tablet: 1 tab(s) orally once a day (08 Jun 2025 16:43)  Procrit 10,000 units/mL preservative-free injectable solution: injectable once a week WEDNESDAY (08 Jun 2025 16:43)  Reclast Infusion , IV x 1 yearly:  (08 Jun 2025 16:43)  simvastatin 10 mg oral tablet: 1 tab(s) orally once a day (at bedtime) (08 Jun 2025 16:43)  tiotropium 2.5 mcg/inh inhalation aerosol: 2 puff(s) inhaled once a day (08 Jun 2025 16:43)  tiZANidine: 2 tab(s) orally once a day (at bedtime) as needed for  muscle spasm (08 Jun 2025 16:43)  torsemide 20 mg oral tablet: 1 tab(s) orally once a day (in the morning) (08 Jun 2025 16:49)      MEDICATIONS  (STANDING):  albuterol/ipratropium for Nebulization 3 milliLiter(s) Nebulizer every 6 hours  aMIOdarone    Tablet 100 milliGRAM(s) Oral daily  chlorhexidine 2% Cloths 1 Application(s) Topical <User Schedule>  dextrose 5%. 1000 milliLiter(s) (50 mL/Hr) IV Continuous <Continuous>  dextrose 5%. 1000 milliLiter(s) (100 mL/Hr) IV Continuous <Continuous>  dextrose 50% Injectable 25 Gram(s) IV Push once  dextrose 50% Injectable 12.5 Gram(s) IV Push once  dextrose 50% Injectable 25 Gram(s) IV Push once  fluticasone propionate/ salmeterol 500-50 MICROgram(s) Diskus 1 Dose(s) Inhalation two times a day  furosemide   Injectable 40 milliGRAM(s) IV Push daily  gabapentin 400 milliGRAM(s) Oral every 12 hours  glucagon  Injectable 1 milliGRAM(s) IntraMuscular once  hydrocortisone sodium succinate Injectable 50 milliGRAM(s) IV Push two times a day  insulin lispro (ADMELOG) corrective regimen sliding scale   SubCutaneous three times a day before meals  pantoprazole  Injectable 40 milliGRAM(s) IV Push daily  piperacillin/tazobactam IVPB.. 3.375 Gram(s) IV Intermittent every 8 hours  sodium chloride 3%  Inhalation 4 milliLiter(s) Inhalation every 6 hours    MEDICATIONS  (PRN):  acetaminophen     Tablet .. 650 milliGRAM(s) Oral every 6 hours PRN Mild Pain (1 - 3)  dextrose Oral Gel 15 Gram(s) Oral once PRN Blood Glucose LESS THAN 70 milliGRAM(s)/deciliter      Diet, Regular:   DASH/TLC Sodium & Cholesterol Restricted (06-23-25 @ 13:52) [Active]          Vital Signs Last 24 Hrs  T(C): 36.4 (25 Jun 2025 05:15), Max: 37.7 (24 Jun 2025 10:00)  T(F): 97.6 (25 Jun 2025 05:15), Max: 99.9 (24 Jun 2025 10:00)  HR: 76 (25 Jun 2025 08:09) (71 - 113)  BP: 110/64 (25 Jun 2025 05:15) (94/73 - 138/77)  BP(mean): 76 (24 Jun 2025 14:00) (76 - 102)  RR: 18 (25 Jun 2025 05:15) (18 - 23)  SpO2: 94% (25 Jun 2025 06:32) (91% - 100%)    Parameters below as of 25 Jun 2025 06:32  Patient On (Oxygen Delivery Method): nasal cannula  O2 Flow (L/min): 5        06-24-25 @ 07:01  -  06-25-25 @ 07:00  --------------------------------------------------------  IN: 75 mL / OUT: 1380 mL / NET: -1305 mL              LABS:                        8.5    5.15  )-----------( 186      ( 25 Jun 2025 05:55 )             26.0     06-25    146[H]  |  99  |  32[H]  ----------------------------<  94  3.2[L]   |  41[H]  |  0.95    Ca    8.7      25 Jun 2025 05:55  Phos  2.4     06-25  Mg     1.6     06-25    TPro  5.8[L]  /  Alb  2.8[L]  /  TBili  0.7  /  DBili  x   /  AST  5[L]  /  ALT  15  /  AlkPhos  56  06-25    PT/INR - ( 24 Jun 2025 05:30 )   PT: 14.7 sec;   INR: 1.26 ratio         PTT - ( 24 Jun 2025 05:30 )  PTT:32.7 sec  Urinalysis Basic - ( 25 Jun 2025 05:55 )    Color: x / Appearance: x / SG: x / pH: x  Gluc: 94 mg/dL / Ketone: x  / Bili: x / Urobili: x   Blood: x / Protein: x / Nitrite: x   Leuk Esterase: x / RBC: x / WBC x   Sq Epi: x / Non Sq Epi: x / Bacteria: x            WBC:  WBC Count: 5.15 K/uL (06-25 @ 05:55)  WBC Count: 4.62 K/uL (06-24 @ 05:30)  WBC Count: 7.82 K/uL (06-23 @ 16:45)  WBC Count: 7.37 K/uL (06-23 @ 05:20)  WBC Count: 15.13 K/uL (06-22 @ 23:14)  WBC Count: 7.28 K/uL (06-22 @ 07:40)      MICROBIOLOGY:  RECENT CULTURES:  06-22 Sputum Sputum XXXX   Few polymorphonuclear leukocytes per low power field  Few Squamous epithelial cells per low power field  No organisms seen per oil power field XXXX                PT/INR - ( 24 Jun 2025 05:30 )   PT: 14.7 sec;   INR: 1.26 ratio         PTT - ( 24 Jun 2025 05:30 )  PTT:32.7 sec    Sodium:  Sodium: 146 mmol/L (06-25 @ 05:55)  Sodium: 147 mmol/L (06-24 @ 05:30)  Sodium: 145 mmol/L (06-23 @ 05:20)  Sodium: 146 mmol/L (06-22 @ 07:40)  Sodium: 146 mmol/L (06-21 @ 09:35)      0.95 mg/dL 06-25 @ 05:55  0.95 mg/dL 06-24 @ 05:30  1.00 mg/dL 06-23 @ 05:20  0.88 mg/dL 06-22 @ 07:40  1.10 mg/dL 06-21 @ 09:35      Hemoglobin:  Hemoglobin: 8.5 g/dL (06-25 @ 05:55)  Hemoglobin: 8.4 g/dL (06-24 @ 05:30)  Hemoglobin: 9.2 g/dL (06-23 @ 16:45)  Hemoglobin: 7.2 g/dL (06-23 @ 05:20)  Hemoglobin: 8.2 g/dL (06-22 @ 23:14)  Hemoglobin: 7.9 g/dL (06-22 @ 12:30)  Hemoglobin: 8.3 g/dL (06-22 @ 07:40)      Platelets: Platelet Count - Automated: 186 K/uL (06-25 @ 05:55)  Platelet Count - Automated: 197 K/uL (06-24 @ 05:30)  Platelet Count - Automated: 209 K/uL (06-23 @ 16:45)  Platelet Count - Automated: 190 K/uL (06-23 @ 05:20)  Platelet Count - Automated: 259 K/uL (06-22 @ 23:14)  Platelet Count - Automated: 253 K/uL (06-22 @ 07:40)      LIVER FUNCTIONS - ( 25 Jun 2025 05:55 )  Alb: 2.8 g/dL / Pro: 5.8 g/dL / ALK PHOS: 56 U/L / ALT: 15 U/L / AST: 5 U/L / GGT: x             Urinalysis Basic - ( 25 Jun 2025 05:55 )    Color: x / Appearance: x / SG: x / pH: x  Gluc: 94 mg/dL / Ketone: x  / Bili: x / Urobili: x   Blood: x / Protein: x / Nitrite: x   Leuk Esterase: x / RBC: x / WBC x   Sq Epi: x / Non Sq Epi: x / Bacteria: x        RADIOLOGY & ADDITIONAL STUDIES:      MICROBIOLOGY:  RECENT CULTURES:  06-22 Sputum Sputum XXXX   Few polymorphonuclear leukocytes per low power field  Few Squamous epithelial cells per low power field  No organisms seen per oil power field XXXX

## 2025-06-25 NOTE — PROGRESS NOTE ADULT - SUBJECTIVE AND OBJECTIVE BOX
[facility] -- Sai Valle, Girish Jackson Savella, Goodger, Cohen: Office # [phone]    Follow Up: Hypotension     Subjective/Observations: Patient seen and examined. Patient awake, alert, resting in bed. No complaints of chest pain, dyspnea, palpitations or dizziness. No signs of orthopnea or PND. Tolerating room air. Rt Chest port in place.     REVIEW OF SYSTEMS: All other review of systems are negative unless indicated above    PAST MEDICAL & SURGICAL HISTORY:  COPD (chronic obstructive pulmonary disease)      DM (diabetes mellitus)  diet-controlled      PAF (paroxysmal atrial fibrillation)  s/p ablation      Hiatal hernia      H/O aplastic anemia      History of IBS      H/O osteoporosis      HLD (hyperlipidemia)      PMR (polymyalgia rheumatica)      History of basal cell cancer      Chronic kidney disease (CKD)      Hypotension      Presence of IVC filter      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      History of immunodeficiency      Lumbar compression fracture      H/O hiatal hernia      H/O pulmonary fibrosis      H/O sinus bradycardia      History of fracture of right hip  "unoperable"      S/P hysterectomy      S/P foot surgery      S/P knee surgery      After cataract  bilateral eye cataract surgically removed      Closed right hip fracture  rigth hip ORIF july 19 2016      S/P IVC filter  nov 2016      S/P carpal tunnel release  Right 2008 & 6/27/17      H/O kyphoplasty      Port-A-Cath in place  right chest          MEDICATIONS  (STANDING):  albuterol/ipratropium for Nebulization 3 milliLiter(s) Nebulizer every 6 hours  aMIOdarone    Tablet 100 milliGRAM(s) Oral daily  chlorhexidine 2% Cloths 1 Application(s) Topical <User Schedule>  dextrose 5%. 1000 milliLiter(s) (50 mL/Hr) IV Continuous <Continuous>  dextrose 5%. 1000 milliLiter(s) (100 mL/Hr) IV Continuous <Continuous>  dextrose 50% Injectable 25 Gram(s) IV Push once  dextrose 50% Injectable 12.5 Gram(s) IV Push once  dextrose 50% Injectable 25 Gram(s) IV Push once  fluticasone propionate/ salmeterol 500-50 MICROgram(s) Diskus 1 Dose(s) Inhalation two times a day  furosemide   Injectable 40 milliGRAM(s) IV Push daily  gabapentin 400 milliGRAM(s) Oral every 12 hours  glucagon  Injectable 1 milliGRAM(s) IntraMuscular once  hydrocortisone sodium succinate Injectable 50 milliGRAM(s) IV Push two times a day  insulin lispro (ADMELOG) corrective regimen sliding scale   SubCutaneous three times a day before meals  pantoprazole  Injectable 40 milliGRAM(s) IV Push daily  piperacillin/tazobactam IVPB.. 3.375 Gram(s) IV Intermittent every 8 hours  potassium chloride    Tablet ER 40 milliEquivalent(s) Oral once  potassium phosphate / sodium phosphate Tablet (K-PHOS No. 2) 1 Tablet(s) Oral once  sodium chloride 3%  Inhalation 4 milliLiter(s) Inhalation every 6 hours    MEDICATIONS  (PRN):  acetaminophen     Tablet .. 650 milliGRAM(s) Oral every 6 hours PRN Mild Pain (1 - 3)  dextrose Oral Gel 15 Gram(s) Oral once PRN Blood Glucose LESS THAN 70 milliGRAM(s)/deciliter    Allergies    No Known Allergies    Intolerances      Vital Signs Last 24 Hrs  T(C): 36.4 (25 Jun 2025 05:15), Max: 37.7 (24 Jun 2025 10:00)  T(F): 97.6 (25 Jun 2025 05:15), Max: 99.9 (24 Jun 2025 10:00)  HR: 76 (25 Jun 2025 08:09) (71 - 106)  BP: 110/64 (25 Jun 2025 05:15) (105/57 - 138/77)  BP(mean): 76 (24 Jun 2025 14:00) (76 - 102)  RR: 18 (25 Jun 2025 05:15) (18 - 23)  SpO2: 94% (25 Jun 2025 06:32) (91% - 100%)    Parameters below as of 25 Jun 2025 06:32  Patient On (Oxygen Delivery Method): nasal cannula  O2 Flow (L/min): 5    I&O's Summary    24 Jun 2025 07:01  -  25 Jun 2025 07:00  --------------------------------------------------------  IN: 75 mL / OUT: 1380 mL / NET: -1305 mL          TELE: SR/ A fib PVC 70-100s nonsustained 130s, 3 beats NSVT  PHYSICAL EXAM:  Constitutional: NAD, awake and alert  HEENT: Moist Mucous Membranes, Anicteric  Pulmonary: Non-labored, breath sounds are clear bilaterally, Diminished at bases, No wheezing, rales or rhonchi  Cardiovascular: Regular, S1 and S2, No murmurs, No rubs, gallops or clicks  Gastrointestinal:  soft, nontender, nondistended   Lymph: No peripheral edema. No lymphadenopathy.   Skin: No visible rashes or ulcers.  Psych:  Mood & affect appropriate      LABS: All Labs Reviewed:                        8.5    5.15  )-----------( 186      ( 25 Jun 2025 05:55 )             26.0                         8.4    4.62  )-----------( 197      ( 24 Jun 2025 05:30 )             26.9                         9.2    7.82  )-----------( 209      ( 23 Jun 2025 16:45 )             28.7     25 Jun 2025 05:55    146    |  99     |  32     ----------------------------<  94     3.2     |  41     |  0.95   24 Jun 2025 05:30    147    |  104    |  34     ----------------------------<  120    3.8     |  40     |  0.95   23 Jun 2025 05:20    145    |  104    |  36     ----------------------------<  86     4.2     |  40     |  1.00     Ca    8.7        25 Jun 2025 05:55  Ca    9.6        24 Jun 2025 05:30  Ca    9.1        23 Jun 2025 05:20  Phos  2.4       25 Jun 2025 05:55  Phos  2.9       24 Jun 2025 05:30  Phos  3.3       23 Jun 2025 05:20  Mg     1.6       25 Jun 2025 05:55  Mg     2.0       24 Jun 2025 05:30  Mg     2.0       23 Jun 2025 05:20    TPro  5.8    /  Alb  2.8    /  TBili  0.7    /  DBili  x      /  AST  5      /  ALT  15     /  AlkPhos  56     25 Jun 2025 05:55  TPro  5.6    /  Alb  2.6    /  TBili  0.7    /  DBili  x      /  AST  6      /  ALT  15     /  AlkPhos  53     24 Jun 2025 05:30  TPro  5.4    /  Alb  2.7    /  TBili  0.6    /  DBili  x      /  AST  3      /  ALT  14     /  AlkPhos  52     23 Jun 2025 05:20   LIVER FUNCTIONS - ( 25 Jun 2025 05:55 )  Alb: 2.8 g/dL / Pro: 5.8 g/dL / ALK PHOS: 56 U/L / ALT: 15 U/L / AST: 5 U/L / GGT: x           PT/INR - ( 24 Jun 2025 05:30 )   PT: 14.7 sec;   INR: 1.26 ratio         PTT - ( 24 Jun 2025 05:30 )  PTT:32.7 secTroponin I, High Sensitivity Result: 26.1 ng/L (06-19-25 @ 06:55)    12 Lead ECG:   Ventricular Rate 88 BPM    Atrial Rate 88 BPM    P-R Interval 130 ms    QRS Duration 78 ms    Q-T Interval 370 ms    QTC Calculation(Bazett) 447 ms    P Axis 79 degrees    R Axis -48 degrees    T Axis 78 degrees    Diagnosis Line Sinus rhythm with premature atrial complexes  Left anterior fascicular block  Possible Lateral infarct , age undetermined (06-19-25 @ 04:43)      TRANSTHORACIC ECHOCARDIOGRAM REPORT  ________________________________________________________________________________                                      _______       Pt. Name:       MARTINEZ JONES Study Date:    6/12/2025  MRN:            [id number]        YOB: 1940  Accession #:    [id number]       Age:           84 years  Account#:       [id number]      Gender:        F  Heart Rate:                     Height:        62.99 in (160.00 cm)  Rhythm:                         Weight:        147.71 lb (67.00 kg)  Blood Pressure: 109/63 mmHg     BSA/BMI:       1.70 m² / 26.17 kg/m²  ________________________________________________________________________________________  Referring Physician:    [id number] Naresh Vitale  Interpreting Physician: Daniel Jorge M.D.  Primary Sonographer:    Jamal Hayes    CPT:               ECHO TTE WO CON COMP W DOPP - 18921.m  Indication(s):     Shock, unspecified - R57.9  Procedure:         Transthoracic echocardiogram with 2-D, M-mode and complete                     spectral and color flow Doppler.  Ordering Location: ICU1  Admission Status:  Inpatient  Study Information: Image quality for this study is technically difficult.    _______________________________________________________________________________________     CONCLUSIONS:      1. Technically difficult image quality.   2. Left ventricular systolic function is normal with an ejection fraction of 67 % by Arias's method of disks.   3. Mild left ventricular hypertrophy.   4.Normal right ventricular cavity size and probably normal right ventricular systolic function.   5. Tricuspid aortic valve with normal leaflet excursion. There is calcification of the aortic valve leaflets.   6. Moderate mitral valve leaflet calcification.   7. Mild mitral regurgitation.   8. Moderate tricuspid regurgitation.   9. The inferior vena cava is normal in size measuring 1.64 cm in diameter, (normal <2.1cm) with normal inspiratory collapse (normal >50%) consistent with normal right atrial pressure (~3, range 0-5mmHg).  10. Estimated pulmonary artery systolic pressure is 57 mmHg, consistent with moderate-to-severe pulmonary hypertension.  11. Trace pericardial effusion.  12. Right pleural effusion noted.  13. Compared to the transthoracic echocardiogram performed on 6/2/2025, there have been no significant interval changes.    ________________________________________________________________________________________  FINDINGS:     Left Ventricle:  Left ventricular systolic function is normal with a calculated ejection fraction of 67 % by the Arias's biplane method of disks. Mild left ventricular hypertrophy.     Right Ventricle:  The right ventricular cavity is normal in size and right ventricular systolic function is probablynormal. Tricuspid annular plane systolic excursion (TAPSE) is 1.9 cm (normal >=1.7 cm).     Left Atrium:  The left atrium is normal in size with an indexed volume of 29.06 ml/m².     Right Atrium:  The right atrium is normal in size with an indexed volume of 26.00 ml/m² and an indexed area of 9.41 cm²/m².     Interatrial Septum:  The interatrial septum appears intact.     Aortic Valve:  The aortic valve is tricuspid with normal leaflet excursion. There is calcification of the aortic valve leaflets. There is mild thickening of the aortic valve leaflets. There is trace aortic regurgitation.     Mitral Valve:  Structurally normal mitral valve with normal leaflet excursion. There is calcification of the mitral valve annulus. There is moderate leaflet calcification. There is mild mitral regurgitation.     Tricuspid Valve:  The tricuspid valve is structurally normal with normal leaflet excursion. There is moderate tricuspid regurgitation. Estimated pulmonary artery systolic pressure is 57 mmHg,consistent with moderate-to-severe pulmonary hypertension.     Pulmonic Valve:  The pulmonic valve was not well visualized. There is trace pulmonic regurgitation.     Aorta:  The aortic root at the sinuses of Valsalva is normal in size, measuring 3.30 cm (indexed 1.94 cm/m²). The ascending aorta is normal in size, measuring 3.00 cm (indexed 1.76 cm/m²). The aortic arch diameter is normal in size, measuring 2.4 cm (indexed 1.41 cm/m²).     Pericardium:  There is a trace pericardial effusion.     Pleura:  Right pleural effusion noted.     Systemic Veins:  The inferior vena cava is normal in size measuring 1.64 cm in diameter, (normal <2.1cm) with normal inspiratory collapse (normal >50%) consistent with normal right atrial pressure (~3, range 0-5mmHg).  ____________________________________________________________________  QUANTITATIVE DATA:  Left Ventricle Measurements: (Indexed to BSA)     IVSd (2D):   1.2 cm  LVPWd (2D):  1.1 cm  LVIDd (2D):  4.2 cm  LVIDs (2D):  2.4 cm  LV Mass:     169 g  99.5 g/m²  LV Vol d, MOD A2C: 38.9 ml 22.88 ml/m²  LV Vol d, MOD A4C: 40.9 ml 24.06 ml/m²  LV Vol d, MOD BP:  40.9 ml 24.04 ml/m²  LV Vol s, MOD A2C: 11.3 ml 6.65 ml/m²  LV Vol s, MOD A4C: 13.4 ml 7.88 ml/m²  LV Vol s, MOD BP:  13.4 ml 7.91 ml/m²  LVOT SV MOD BP:    27.4 ml  LV EF% MOD BP:     67 %     MV E Vmax:    1.54 m/s  MV A Vmax:    0.74 m/s  MV E/A:       2.08  e' lateral:   11.90 cm/s  e' medial:    8.81 cm/s  E/e' lateral: 12.94  E/e' medial:  17.48  E/e' Average: 14.87  MV DT:272 msec    Aorta Measurements: (Normal range) (Indexed to BSA)     Ao Root d     3.30 cm (2.7 - 3.3 cm) 1.94 cm/m²  Ao Asc d, 2D: 3.00  Ao Asc prox:  3.00 cm                1.76 cm/m²  Ao Arch:      2.4 cm            Left Atrium Measurements: (Indexed to BSA)  LA Diam 2D: 3.60 cm         Right Ventricle Measurements: Right Atrial Measurements:     TAPSE:            1.9 cm      RA Vol s, MOD A4C         44.2 ml  RV Base (RVID1):  2.9 cm      RA Vol s, MOD A4C i BSA   26.00 ml/m²  RV Mid (RVID2): 2.5 cm      RA Area s, MOD A4C        16.0 cm²  RV Major (RVID3): 6.4 cm      RA Area s, MOD A4C, i BSA 9.41 cm²/m²       LVOT / RVOT/ Qp/Qs Data: (Indexed to BSA)  LVOT Diameter,s: 2.20 cm  LVOT Area:       3.80 cm²    Mitral Valve Measurements:     MV E Vmax: 1.5 m/s         MR Vmax:          3.80 m/s  MV A Vmax: 0.7 m/s         MR Peak Gradient: 57.8 mmHg  MV E/A:    2.1       Tricuspid Valve Measurements:     TR Vmax:          3.7 m/s  TR Peak Gradient: 54.5 mmHg  RA Pressure:      3 mmHg  PASP:             57 mmHg    ________________________________________________________________________________________  Electronically signed on 6/13/2025 at 11:19:51 AM by Daniel Jorge M.D.         *** Final ***   [facility] -- Sai Valle, Girish Jackson Savella, Goodger, Cohen: Office # [phone]    Follow Up: Hypotension     Subjective/Observations: Patient seen and examined. Patient awake, alert, resting in bed. No complaints of chest pain, dyspnea, palpitations or dizziness. No signs of orthopnea or PND. Tolerating room air. Rt Chest port in place.     REVIEW OF SYSTEMS: All other review of systems are negative unless indicated above    PAST MEDICAL & SURGICAL HISTORY:  COPD (chronic obstructive pulmonary disease)      DM (diabetes mellitus)  diet-controlled      PAF (paroxysmal atrial fibrillation)  s/p ablation      Hiatal hernia      H/O aplastic anemia      History of IBS      H/O osteoporosis      HLD (hyperlipidemia)      PMR (polymyalgia rheumatica)      History of basal cell cancer      Chronic kidney disease (CKD)      Hypotension      Presence of IVC filter      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      History of immunodeficiency      Lumbar compression fracture      H/O hiatal hernia      H/O pulmonary fibrosis      H/O sinus bradycardia      History of fracture of right hip  "unoperable"      S/P hysterectomy      S/P foot surgery      S/P knee surgery      After cataract  bilateral eye cataract surgically removed      Closed right hip fracture  rigth hip ORIF july 19 2016      S/P IVC filter  nov 2016      S/P carpal tunnel release  Right 2008 & 6/27/17      H/O kyphoplasty      Port-A-Cath in place  right chest          MEDICATIONS  (STANDING):  albuterol/ipratropium for Nebulization 3 milliLiter(s) Nebulizer every 6 hours  aMIOdarone    Tablet 100 milliGRAM(s) Oral daily  chlorhexidine 2% Cloths 1 Application(s) Topical <User Schedule>  dextrose 5%. 1000 milliLiter(s) (50 mL/Hr) IV Continuous <Continuous>  dextrose 5%. 1000 milliLiter(s) (100 mL/Hr) IV Continuous <Continuous>  dextrose 50% Injectable 25 Gram(s) IV Push once  dextrose 50% Injectable 12.5 Gram(s) IV Push once  dextrose 50% Injectable 25 Gram(s) IV Push once  fluticasone propionate/ salmeterol 500-50 MICROgram(s) Diskus 1 Dose(s) Inhalation two times a day  furosemide   Injectable 40 milliGRAM(s) IV Push daily  gabapentin 400 milliGRAM(s) Oral every 12 hours  glucagon  Injectable 1 milliGRAM(s) IntraMuscular once  hydrocortisone sodium succinate Injectable 50 milliGRAM(s) IV Push two times a day  insulin lispro (ADMELOG) corrective regimen sliding scale   SubCutaneous three times a day before meals  pantoprazole  Injectable 40 milliGRAM(s) IV Push daily  piperacillin/tazobactam IVPB.. 3.375 Gram(s) IV Intermittent every 8 hours  potassium chloride    Tablet ER 40 milliEquivalent(s) Oral once  potassium phosphate / sodium phosphate Tablet (K-PHOS No. 2) 1 Tablet(s) Oral once  sodium chloride 3%  Inhalation 4 milliLiter(s) Inhalation every 6 hours    MEDICATIONS  (PRN):  acetaminophen     Tablet .. 650 milliGRAM(s) Oral every 6 hours PRN Mild Pain (1 - 3)  dextrose Oral Gel 15 Gram(s) Oral once PRN Blood Glucose LESS THAN 70 milliGRAM(s)/deciliter    Allergies    No Known Allergies    Intolerances      Vital Signs Last 24 Hrs  T(C): 36.4 (25 Jun 2025 05:15), Max: 37.7 (24 Jun 2025 10:00)  T(F): 97.6 (25 Jun 2025 05:15), Max: 99.9 (24 Jun 2025 10:00)  HR: 76 (25 Jun 2025 08:09) (71 - 106)  BP: 110/64 (25 Jun 2025 05:15) (105/57 - 138/77)  BP(mean): 76 (24 Jun 2025 14:00) (76 - 102)  RR: 18 (25 Jun 2025 05:15) (18 - 23)  SpO2: 94% (25 Jun 2025 06:32) (91% - 100%)    Parameters below as of 25 Jun 2025 06:32  Patient On (Oxygen Delivery Method): nasal cannula  O2 Flow (L/min): 5    I&O's Summary    24 Jun 2025 07:01  -  25 Jun 2025 07:00  --------------------------------------------------------  IN: 75 mL / OUT: 1380 mL / NET: -1305 mL          TELE: SR/ A fib PVC 70-100s nonsustained 130s, 3 beats NSVT  PHYSICAL EXAM:  Constitutional: NAD, awake and alert  HEENT: Moist Mucous Membranes, Anicteric  Pulmonary: Non-labored, breath sounds are clear bilaterally, Diminished at bases, No wheezing, rales or rhonchi  Cardiovascular: Regular, S1 and S2, No murmurs, No rubs, gallops or clicks  Gastrointestinal:  soft, nontender, nondistended   Lymph: No peripheral edema. No lymphadenopathy.   Skin: No visible rashes or ulcers.  Psych:  Mood & affect appropriate      LABS: All Labs Reviewed:                        8.5    5.15  )-----------( 186      ( 25 Jun 2025 05:55 )             26.0                         8.4    4.62  )-----------( 197      ( 24 Jun 2025 05:30 )             26.9                         9.2    7.82  )-----------( 209      ( 23 Jun 2025 16:45 )             28.7     25 Jun 2025 05:55    146    |  99     |  32     ----------------------------<  94     3.2     |  41     |  0.95   24 Jun 2025 05:30    147    |  104    |  34     ----------------------------<  120    3.8     |  40     |  0.95   23 Jun 2025 05:20    145    |  104    |  36     ----------------------------<  86     4.2     |  40     |  1.00     Ca    8.7        25 Jun 2025 05:55  Ca    9.6        24 Jun 2025 05:30  Ca    9.1        23 Jun 2025 05:20  Phos  2.4       25 Jun 2025 05:55  Phos  2.9       24 Jun 2025 05:30  Phos  3.3       23 Jun 2025 05:20  Mg     1.6       25 Jun 2025 05:55  Mg     2.0       24 Jun 2025 05:30  Mg     2.0       23 Jun 2025 05:20    TPro  5.8    /  Alb  2.8    /  TBili  0.7    /  DBili  x      /  AST  5      /  ALT  15     /  AlkPhos  56     25 Jun 2025 05:55  TPro  5.6    /  Alb  2.6    /  TBili  0.7    /  DBili  x      /  AST  6      /  ALT  15     /  AlkPhos  53     24 Jun 2025 05:30  TPro  5.4    /  Alb  2.7    /  TBili  0.6    /  DBili  x      /  AST  3      /  ALT  14     /  AlkPhos  52     23 Jun 2025 05:20   LIVER FUNCTIONS - ( 25 Jun 2025 05:55 )  Alb: 2.8 g/dL / Pro: 5.8 g/dL / ALK PHOS: 56 U/L / ALT: 15 U/L / AST: 5 U/L / GGT: x           PT/INR - ( 24 Jun 2025 05:30 )   PT: 14.7 sec;   INR: 1.26 ratio         PTT - ( 24 Jun 2025 05:30 )  PTT:32.7 secTroponin I, High Sensitivity Result: 26.1 ng/L (06-19-25 @ 06:55)    12 Lead ECG:   Ventricular Rate 88 BPM    Atrial Rate 88 BPM    P-R Interval 130 ms    QRS Duration 78 ms    Q-T Interval 370 ms    QTC Calculation(Bazett) 447 ms    P Axis 79 degrees    R Axis -48 degrees    T Axis 78 degrees    Diagnosis Line Sinus rhythm with premature atrial complexes  Left anterior fascicular block  Possible Lateral infarct , age undetermined (06-19-25 @ 04:43)      TRANSTHORACIC ECHOCARDIOGRAM REPORT  ________________________________________________________________________________                                      _______       Pt. Name:       MARTINEZ JONES Study Date:    6/12/2025  MRN:            [id number]        YOB: 1940  Accession #:    [id number]       Age:           84 years  Account#:       [id number]      Gender:        F  Heart Rate:                     Height:        62.99 in (160.00 cm)  Rhythm:                         Weight:        147.71 lb (67.00 kg)  Blood Pressure: 109/63 mmHg     BSA/BMI:       1.70 m² / 26.17 kg/m²  ________________________________________________________________________________________  Referring Physician:    [id number] Naresh Vitale  Interpreting Physician: Daniel Jorge M.D.  Primary Sonographer:    Jamal Hayes    CPT:               ECHO TTE WO CON COMP W DOPP - 38512.m  Indication(s):     Shock, unspecified - R57.9  Procedure:         Transthoracic echocardiogram with 2-D, M-mode and complete                     spectral and color flow Doppler.  Ordering Location: ICU1  Admission Status:  Inpatient  Study Information: Image quality for this study is technically difficult.    _______________________________________________________________________________________     CONCLUSIONS:      1. Technically difficult image quality.   2. Left ventricular systolic function is normal with an ejection fraction of 67 % by Arias's method of disks.   3. Mild left ventricular hypertrophy.   4.Normal right ventricular cavity size and probably normal right ventricular systolic function.   5. Tricuspid aortic valve with normal leaflet excursion. There is calcification of the aortic valve leaflets.   6. Moderate mitral valve leaflet calcification.   7. Mild mitral regurgitation.   8. Moderate tricuspid regurgitation.   9. The inferior vena cava is normal in size measuring 1.64 cm in diameter, (normal <2.1cm) with normal inspiratory collapse (normal >50%) consistent with normal right atrial pressure (~3, range 0-5mmHg).  10. Estimated pulmonary artery systolic pressure is 57 mmHg, consistent with moderate-to-severe pulmonary hypertension.  11. Trace pericardial effusion.  12. Right pleural effusion noted.  13. Compared to the transthoracic echocardiogram performed on 6/2/2025, there have been no significant interval changes.    ________________________________________________________________________________________  FINDINGS:     Left Ventricle:  Left ventricular systolic function is normal with a calculated ejection fraction of 67 % by the Arias's biplane method of disks. Mild left ventricular hypertrophy.     Right Ventricle:  The right ventricular cavity is normal in size and right ventricular systolic function is probablynormal. Tricuspid annular plane systolic excursion (TAPSE) is 1.9 cm (normal >=1.7 cm).     Left Atrium:  The left atrium is normal in size with an indexed volume of 29.06 ml/m².     Right Atrium:  The right atrium is normal in size with an indexed volume of 26.00 ml/m² and an indexed area of 9.41 cm²/m².     Interatrial Septum:  The interatrial septum appears intact.     Aortic Valve:  The aortic valve is tricuspid with normal leaflet excursion. There is calcification of the aortic valve leaflets. There is mild thickening of the aortic valve leaflets. There is trace aortic regurgitation.     Mitral Valve:  Structurally normal mitral valve with normal leaflet excursion. There is calcification of the mitral valve annulus. There is moderate leaflet calcification. There is mild mitral regurgitation.     Tricuspid Valve:  The tricuspid valve is structurally normal with normal leaflet excursion. There is moderate tricuspid regurgitation. Estimated pulmonary artery systolic pressure is 57 mmHg,consistent with moderate-to-severe pulmonary hypertension.     Pulmonic Valve:  The pulmonic valve was not well visualized. There is trace pulmonic regurgitation.     Aorta:  The aortic root at the sinuses of Valsalva is normal in size, measuring 3.30 cm (indexed 1.94 cm/m²). The ascending aorta is normal in size, measuring 3.00 cm (indexed 1.76 cm/m²). The aortic arch diameter is normal in size, measuring 2.4 cm (indexed 1.41 cm/m²).     Pericardium:  There is a trace pericardial effusion.     Pleura:  Right pleural effusion noted.     Systemic Veins:  The inferior vena cava is normal in size measuring 1.64 cm in diameter, (normal <2.1cm) with normal inspiratory collapse (normal >50%) consistent with normal right atrial pressure (~3, range 0-5mmHg).  ____________________________________________________________________  QUANTITATIVE DATA:  Left Ventricle Measurements: (Indexed to BSA)     IVSd (2D):   1.2 cm  LVPWd (2D):  1.1 cm  LVIDd (2D):  4.2 cm  LVIDs (2D):  2.4 cm  LV Mass:     169 g  99.5 g/m²  LV Vol d, MOD A2C: 38.9 ml 22.88 ml/m²  LV Vol d, MOD A4C: 40.9 ml 24.06 ml/m²  LV Vol d, MOD BP:  40.9 ml 24.04 ml/m²  LV Vol s, MOD A2C: 11.3 ml 6.65 ml/m²  LV Vol s, MOD A4C: 13.4 ml 7.88 ml/m²  LV Vol s, MOD BP:  13.4 ml 7.91 ml/m²  LVOT SV MOD BP:    27.4 ml  LV EF% MOD BP:     67 %     MV E Vmax:    1.54 m/s  MV A Vmax:    0.74 m/s  MV E/A:       2.08  e' lateral:   11.90 cm/s  e' medial:    8.81 cm/s  E/e' lateral: 12.94  E/e' medial:  17.48  E/e' Average: 14.87  MV DT:272 msec    Aorta Measurements: (Normal range) (Indexed to BSA)     Ao Root d     3.30 cm (2.7 - 3.3 cm) 1.94 cm/m²  Ao Asc d, 2D: 3.00  Ao Asc prox:  3.00 cm                1.76 cm/m²  Ao Arch:      2.4 cm            Left Atrium Measurements: (Indexed to BSA)  LA Diam 2D: 3.60 cm         Right Ventricle Measurements: Right Atrial Measurements:     TAPSE:            1.9 cm      RA Vol s, MOD A4C         44.2 ml  RV Base (RVID1):  2.9 cm      RA Vol s, MOD A4C i BSA   26.00 ml/m²  RV Mid (RVID2): 2.5 cm      RA Area s, MOD A4C        16.0 cm²  RV Major (RVID3): 6.4 cm      RA Area s, MOD A4C, i BSA 9.41 cm²/m²       LVOT / RVOT/ Qp/Qs Data: (Indexed to BSA)  LVOT Diameter,s: 2.20 cm  LVOT Area:       3.80 cm²    Mitral Valve Measurements:     MV E Vmax: 1.5 m/s         MR Vmax:          3.80 m/s  MV A Vmax: 0.7 m/s         MR Peak Gradient: 57.8 mmHg  MV E/A:    2.1       Tricuspid Valve Measurements:     TR Vmax:          3.7 m/s  TR Peak Gradient: 54.5 mmHg  RA Pressure:      3 mmHg  PASP:             57 mmHg    ________________________________________________________________________________________  Electronically signed on 6/13/2025 at 11:19:51 AM by Daniel Jorge M.D.         *** Final ***

## 2025-06-26 LAB
ALBUMIN SERPL ELPH-MCNC: 2.7 G/DL — LOW (ref 3.3–5)
ALP SERPL-CCNC: 56 U/L — SIGNIFICANT CHANGE UP (ref 40–120)
ALT FLD-CCNC: 14 U/L — SIGNIFICANT CHANGE UP (ref 12–78)
ANION GAP SERPL CALC-SCNC: 3 MMOL/L — LOW (ref 5–17)
AST SERPL-CCNC: 11 U/L — LOW (ref 15–37)
BASE EXCESS BLDA CALC-SCNC: 20.9 MMOL/L — HIGH (ref -2–3)
BILIRUB SERPL-MCNC: 0.5 MG/DL — SIGNIFICANT CHANGE UP (ref 0.2–1.2)
BLOOD GAS COMMENTS ARTERIAL: SIGNIFICANT CHANGE UP
BUN SERPL-MCNC: 33 MG/DL — HIGH (ref 7–23)
CALCIUM SERPL-MCNC: 8.9 MG/DL — SIGNIFICANT CHANGE UP (ref 8.5–10.1)
CHLORIDE SERPL-SCNC: 102 MMOL/L — SIGNIFICANT CHANGE UP (ref 96–108)
CO2 SERPL-SCNC: 41 MMOL/L — HIGH (ref 22–31)
CREAT SERPL-MCNC: 0.95 MG/DL — SIGNIFICANT CHANGE UP (ref 0.5–1.3)
CULTURE RESULTS: SIGNIFICANT CHANGE UP
EGFR: 60 ML/MIN/1.73M2 — SIGNIFICANT CHANGE UP
EGFR: 60 ML/MIN/1.73M2 — SIGNIFICANT CHANGE UP
GAS PNL BLDA: SIGNIFICANT CHANGE UP
GLUCOSE SERPL-MCNC: 99 MG/DL — SIGNIFICANT CHANGE UP (ref 70–99)
HCO3 BLDA-SCNC: 45 MMOL/L — CRITICAL HIGH (ref 21–28)
HCT VFR BLD CALC: 26.3 % — LOW (ref 34.5–45)
HGB BLD-MCNC: 8.3 G/DL — LOW (ref 11.5–15.5)
HOROWITZ INDEX BLDA+IHG-RTO: 30 — SIGNIFICANT CHANGE UP
MCHC RBC-ENTMCNC: 30.3 PG — SIGNIFICANT CHANGE UP (ref 27–34)
MCHC RBC-ENTMCNC: 31.6 G/DL — LOW (ref 32–36)
MCV RBC AUTO: 96 FL — SIGNIFICANT CHANGE UP (ref 80–100)
NRBC # BLD AUTO: 0 K/UL — SIGNIFICANT CHANGE UP (ref 0–0)
NRBC # FLD: 0 K/UL — SIGNIFICANT CHANGE UP (ref 0–0)
NRBC BLD AUTO-RTO: 0 /100 WBCS — SIGNIFICANT CHANGE UP (ref 0–0)
PCO2 BLDA: 70 MMHG — CRITICAL HIGH (ref 32–35)
PH BLDA: 7.42 — SIGNIFICANT CHANGE UP (ref 7.35–7.45)
PLATELET # BLD AUTO: 176 K/UL — SIGNIFICANT CHANGE UP (ref 150–400)
PMV BLD: 11.5 FL — SIGNIFICANT CHANGE UP (ref 7–13)
PO2 BLDA: 67 MMHG — LOW (ref 83–108)
POTASSIUM SERPL-MCNC: 3.8 MMOL/L — SIGNIFICANT CHANGE UP (ref 3.5–5.3)
POTASSIUM SERPL-SCNC: 3.8 MMOL/L — SIGNIFICANT CHANGE UP (ref 3.5–5.3)
PROT SERPL-MCNC: 5.8 G/DL — LOW (ref 6–8.3)
RBC # BLD: 2.74 M/UL — LOW (ref 3.8–5.2)
RBC # FLD: 17.3 % — HIGH (ref 10.3–14.5)
SAO2 % BLDA: 97.2 % — SIGNIFICANT CHANGE UP (ref 94–98)
SODIUM SERPL-SCNC: 146 MMOL/L — HIGH (ref 135–145)
SPECIMEN SOURCE: SIGNIFICANT CHANGE UP
VIRUS SPEC CULT: SIGNIFICANT CHANGE UP
WBC # BLD: 4.89 K/UL — SIGNIFICANT CHANGE UP (ref 3.8–10.5)
WBC # FLD AUTO: 4.89 K/UL — SIGNIFICANT CHANGE UP (ref 3.8–10.5)

## 2025-06-26 PROCEDURE — 99233 SBSQ HOSP IP/OBS HIGH 50: CPT

## 2025-06-26 PROCEDURE — 71045 X-RAY EXAM CHEST 1 VIEW: CPT | Mod: 26

## 2025-06-26 PROCEDURE — 99232 SBSQ HOSP IP/OBS MODERATE 35: CPT

## 2025-06-26 RX ORDER — TORSEMIDE 20 MG/1
20 TABLET ORAL DAILY
Refills: 0 | Status: DISCONTINUED | OUTPATIENT
Start: 2025-06-26 | End: 2025-06-28

## 2025-06-26 RX ORDER — METOPROLOL SUCCINATE 50 MG/1
12.5 TABLET, EXTENDED RELEASE ORAL
Refills: 0 | Status: DISCONTINUED | OUTPATIENT
Start: 2025-06-26 | End: 2025-06-27

## 2025-06-26 RX ORDER — IPRATROPIUM BROMIDE AND ALBUTEROL SULFATE .5; 2.5 MG/3ML; MG/3ML
3 SOLUTION RESPIRATORY (INHALATION) THREE TIMES A DAY
Refills: 0 | Status: DISCONTINUED | OUTPATIENT
Start: 2025-06-26 | End: 2025-07-04

## 2025-06-26 RX ORDER — HYDROCORTISONE 20 MG
25 TABLET ORAL DAILY
Refills: 0 | Status: DISCONTINUED | OUTPATIENT
Start: 2025-06-26 | End: 2025-06-27

## 2025-06-26 RX ADMIN — Medication 4 MILLILITER(S): at 20:08

## 2025-06-26 RX ADMIN — Medication 4 MILLILITER(S): at 07:24

## 2025-06-26 RX ADMIN — Medication 25 GRAM(S): at 05:03

## 2025-06-26 RX ADMIN — IPRATROPIUM BROMIDE AND ALBUTEROL SULFATE 3 MILLILITER(S): .5; 2.5 SOLUTION RESPIRATORY (INHALATION) at 13:24

## 2025-06-26 RX ADMIN — Medication 40 MILLIGRAM(S): at 11:46

## 2025-06-26 RX ADMIN — Medication 1 DOSE(S): at 22:40

## 2025-06-26 RX ADMIN — Medication 25 GRAM(S): at 22:40

## 2025-06-26 RX ADMIN — IPRATROPIUM BROMIDE AND ALBUTEROL SULFATE 3 MILLILITER(S): .5; 2.5 SOLUTION RESPIRATORY (INHALATION) at 07:24

## 2025-06-26 RX ADMIN — GABAPENTIN 400 MILLIGRAM(S): 400 CAPSULE ORAL at 17:50

## 2025-06-26 RX ADMIN — Medication 25 MILLIGRAM(S): at 05:03

## 2025-06-26 RX ADMIN — TORSEMIDE 20 MILLIGRAM(S): 20 TABLET ORAL at 13:39

## 2025-06-26 RX ADMIN — Medication 25 GRAM(S): at 13:38

## 2025-06-26 RX ADMIN — Medication 4 MILLILITER(S): at 13:24

## 2025-06-26 RX ADMIN — Medication 1 DOSE(S): at 05:56

## 2025-06-26 RX ADMIN — AMIODARONE HYDROCHLORIDE 100 MILLIGRAM(S): 50 INJECTION, SOLUTION INTRAVENOUS at 05:04

## 2025-06-26 RX ADMIN — GABAPENTIN 400 MILLIGRAM(S): 400 CAPSULE ORAL at 05:04

## 2025-06-26 RX ADMIN — IPRATROPIUM BROMIDE AND ALBUTEROL SULFATE 3 MILLILITER(S): .5; 2.5 SOLUTION RESPIRATORY (INHALATION) at 20:08

## 2025-06-26 NOTE — PROGRESS NOTE ADULT - SUBJECTIVE AND OBJECTIVE BOX
[facility]  INFECTIOUS DISEASES   [address] [place] [address]  Tel: [phone]     Fax: [phone]  ======================================================  MD Emma Archer Kaushal, MD Cho, Michelle, MD   ======================================================    Assessment/Recommendations:   84-year-old female admitted on June 8 with right-sided chest pain generalized malaise lethargy  Past medical history: afib on eliquis, COPD (not on home O2), PE/Rt lower extremity DVT 2017, Rt LE IVC filter 2017 CKD3, aplastic anemia, polymyalgia rheumatica on prednisone 5 mg po qd, requiring PRBC transfusions ~8 weeks (via Rt subclavian mediport), AVN bilat hips, HTN, HLD, HFpEF (75% 6.2.25), diastolic dysfx grade1, recent hospitalization 5/25/25-6/5/25 for AHDF/COPD exacerbation, IVC filter right subclavian Mediport  Admitted for  Acute hypoxic hypercapnic respiratory failure  Hemoptysis  Multifocal pneumonia  Anemia      Patient Seen and examined   WBC improved/Normal, afebrile, hemodynamically stable  Trend WBC and temperature curve through hospital course  Overall improved oxygen requirement, nocturnal noninvasive ventilator    Cultures:  Blood culture: June 8: 2 sets: Negative to date  RSV; Influenza A, B; SARS CoV-2 Viral PCR: Negative on admission   Urine culture: June 8: Negative to date  Sputum culture: June 8: Negative to date   bronchoscopy culture: June 16: Negative to date, Legionella negative as well, PJP PCR negative  Sputum culture: June 22: Negative to date   MRSA PCR: Negative: June 23   Legionella urinary antigen negative from June 9 and 24 as well as bronchoscopy  Streptococcal urinary antigen negative from June 9 and 24    Imaging:    CT Chest No Cont (06.22.25 @ 16:10) 6/10/2025, Increased consolidation and ground-glass   opacity within right upper lobe. New large consolidations within left upper lobe and left lower lobe. Small pleural effusions are increased.    Antibiotics:    Zosyn 3.375 gm IV q8hour as extended infusion over 4 hours   Can continue with IV antibiotics for now  Last day of antibiotics: June 29  If plan to discharge prior to that can transition to Augmentin 500/125 mg p.o. 3 times daily  Sputum clearing up with no consistent hemoptysis now  Management of anticoagulation as per primary team  Rest of the medical management as per primary team      Daily CBC, CMP, Mg, PO4 while on Antibiotics     Plan explained to patient   D/w Primary team     ________________________________    Last 24 hours:    difficulty breathing cough phlegm production overall improving, No further hemoptysis  Denies fever chills nausea vomiting diarrhea dysuria frequency rash    Further ROS:  All systems reviewed. No other complaints besides mentioned above     ______________________________  Allergies    No Known Allergies        MEDICATIONS  (STANDING):  albuterol/ipratropium for Nebulization 3 milliLiter(s) Nebulizer three times a day  aMIOdarone    Tablet 100 milliGRAM(s) Oral daily  chlorhexidine 2% Cloths 1 Application(s) Topical <User Schedule>  dextrose 5%. 1000 milliLiter(s) (50 mL/Hr) IV Continuous <Continuous>  dextrose 5%. 1000 milliLiter(s) (100 mL/Hr) IV Continuous <Continuous>  dextrose 50% Injectable 12.5 Gram(s) IV Push once  dextrose 50% Injectable 25 Gram(s) IV Push once  dextrose 50% Injectable 25 Gram(s) IV Push once  fluticasone propionate/ salmeterol 500-50 MICROgram(s) Diskus 1 Dose(s) Inhalation two times a day  gabapentin 400 milliGRAM(s) Oral every 12 hours  glucagon  Injectable 1 milliGRAM(s) IntraMuscular once  hydrocortisone sodium succinate Injectable 25 milliGRAM(s) IV Push two times a day  insulin lispro (ADMELOG) corrective regimen sliding scale   SubCutaneous three times a day before meals  pantoprazole  Injectable 40 milliGRAM(s) IV Push daily  piperacillin/tazobactam IVPB.. 3.375 Gram(s) IV Intermittent every 8 hours  sodium chloride 3%  Inhalation 4 milliLiter(s) Inhalation three times a day    MEDICATIONS  (PRN):  acetaminophen     Tablet .. 650 milliGRAM(s) Oral every 6 hours PRN Mild Pain (1 - 3)  dextrose Oral Gel 15 Gram(s) Oral once PRN Blood Glucose LESS THAN 70 milliGRAM(s)/deciliter    _________________    PHYSICAL EXAM:    Vital Signs Last 24 Hrs  T(C): 36.5 (26 Jun 2025 05:10), Max: 36.9 (25 Jun 2025 20:30)  T(F): 97.7 (26 Jun 2025 05:10), Max: 98.4 (25 Jun 2025 20:30)  HR: 100 (26 Jun 2025 07:45) (76 - 100)  BP: 126/72 (26 Jun 2025 05:10) (109/65 - 126/72)  RR: 18 (26 Jun 2025 05:10) (18 - 18)  SpO2: 94% (26 Jun 2025 07:45) (92% - 96%)  Parameters below as of 26 Jun 2025 07:45  Patient On (Oxygen Delivery Method): nasal cannula    General: No acute distress.   sitting in chair comfortably , nasal cannula present  Neuro: AAO*3, No motor, sensory, or cranial nerve deficit  HEENT: Pupils equal, reactive to light, Oral mucosa moist  PULM: Clear to auscultation bilaterally Except rhonchi and crackles on both right and left upper zone posteriorly, no significant adventitious breath sounds ; right chest Mediport without surrounding cellulitic changes  CVS: Regular rhythm and controlled rate, no murmurs, rubs, or gallops  ABD: Soft, nondistended, nontender, normoactive bowel sounds, no CVA tenderness  EXT: No b/l LE edema, nontender with pedal pulse palpable ; left forearm and hand bruising with multiple IV attempts  SKIN: Warm and well perfused, no acute rashes   ______________  LABS, MICROBIOLOGY, RADIOLOGY & ADDITIONAL STUDIES: Reviewed

## 2025-06-26 NOTE — PROGRESS NOTE ADULT - SUBJECTIVE AND OBJECTIVE BOX
[facility]                                                       Dr. Chisholm, Dr. Franklin, Dr. Baron, Dr. Clark, Dr. Stock                                      [facility][note 3], [facility], [address]                                                 [address] [place] [address]                                      Ph: [phone]  Fax: [phone]                                         Ph: [phone]  Fax: [phone]      Patient is a 84y old  Female who presents with a chief complaint of septic shock, PNA (09 Jun 2025 12:36)  Patient seen in follow up for KIMBERLY on CKD.        PAST MEDICAL HISTORY:  COPD (chronic obstructive pulmonary disease)    DM (diabetes mellitus)    Pulmonary fibrosis    PAF (paroxysmal atrial fibrillation)    Hiatal hernia    GIB (gastrointestinal bleeding)    Pericarditis    Shoulder fracture, right    Hematoma    H/O aplastic anemia    History of IBS    H/O osteoporosis    HLD (hyperlipidemia)    PMR (polymyalgia rheumatica)    History of basal cell cancer    Chronic kidney disease (CKD)    Hypotension    Presence of IVC filter    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    History of immunodeficiency    Lumbar compression fracture    H/O hiatal hernia    H/O pulmonary fibrosis    H/O sinus bradycardia    History of fracture of right hip      MEDICATIONS  (STANDING):  albuterol/ipratropium for Nebulization 3 milliLiter(s) Nebulizer three times a day  aMIOdarone    Tablet 100 milliGRAM(s) Oral daily  chlorhexidine 2% Cloths 1 Application(s) Topical <User Schedule>  dextrose 5%. 1000 milliLiter(s) (50 mL/Hr) IV Continuous <Continuous>  dextrose 5%. 1000 milliLiter(s) (100 mL/Hr) IV Continuous <Continuous>  dextrose 50% Injectable 12.5 Gram(s) IV Push once  dextrose 50% Injectable 25 Gram(s) IV Push once  dextrose 50% Injectable 25 Gram(s) IV Push once  fluticasone propionate/ salmeterol 500-50 MICROgram(s) Diskus 1 Dose(s) Inhalation two times a day  gabapentin 400 milliGRAM(s) Oral every 12 hours  glucagon  Injectable 1 milliGRAM(s) IntraMuscular once  hydrocortisone sodium succinate Injectable 25 milliGRAM(s) IV Push daily  insulin lispro (ADMELOG) corrective regimen sliding scale   SubCutaneous three times a day before meals  metoprolol tartrate 12.5 milliGRAM(s) Oral two times a day  pantoprazole  Injectable 40 milliGRAM(s) IV Push daily  piperacillin/tazobactam IVPB.. 3.375 Gram(s) IV Intermittent every 8 hours  sodium chloride 3%  Inhalation 4 milliLiter(s) Inhalation three times a day  torsemide 20 milliGRAM(s) Oral daily    MEDICATIONS  (PRN):  acetaminophen     Tablet .. 650 milliGRAM(s) Oral every 6 hours PRN Mild Pain (1 - 3)  dextrose Oral Gel 15 Gram(s) Oral once PRN Blood Glucose LESS THAN 70 milliGRAM(s)/deciliter    T(C): 36.5 (06-26-25 @ 05:10), Max: 37.7 (06-24-25 @ 14:00)  HR: 100 (06-26-25 @ 07:45) (71 - 100)  BP: 126/72 (06-26-25 @ 05:10) (105/57 - 138/77)  RR: 18 (06-26-25 @ 05:10)  SpO2: 94% (06-26-25 @ 07:45)  Wt(kg): --  I&O's Detail    25 Jun 2025 07:01  -  26 Jun 2025 07:00  --------------------------------------------------------  IN:    IV PiggyBack: 150 mL  Total IN: 150 mL    OUT:    Voided (mL): 420 mL  Total OUT: 420 mL    Total NET: -270 mL      26 Jun 2025 07:01  -  26 Jun 2025 12:00  --------------------------------------------------------  IN:    Oral Fluid: 300 mL  Total IN: 300 mL    OUT:  Total OUT: 0 mL    Total NET: 300 mL                  PHYSICAL EXAM:  General: No distress  Respiratory: b/l air entry  Cardiovascular: S1 S2  Gastrointestinal: soft  Extremities:  edema          LABORATORY:                        8.3    4.89  )-----------( 176      ( 26 Jun 2025 05:45 )             26.3     06-26    146[H]  |  102  |  33[H]  ----------------------------<  99  3.8   |  41[H]  |  0.95    Ca    8.9      26 Jun 2025 05:45  Phos  2.4     06-25  Mg     1.6     06-25    TPro  5.8[L]  /  Alb  2.7[L]  /  TBili  0.5  /  DBili  x   /  AST  11[L]  /  ALT  14  /  AlkPhos  56  06-26    Sodium: 146 mmol/L (06-26 @ 05:45)  Sodium: 146 mmol/L (06-25 @ 05:55)    Potassium: 3.8 mmol/L (06-26 @ 05:45)  Potassium: 3.2 mmol/L (06-25 @ 05:55)    Hemoglobin: 8.3 g/dL (06-26 @ 05:45)  Hemoglobin: 8.5 g/dL (06-25 @ 05:55)  Hemoglobin: 8.4 g/dL (06-24 @ 05:30)  Hemoglobin: 9.2 g/dL (06-23 @ 16:45)    Creatinine, Serum 0.95 (06-26 @ 05:45)  Creatinine, Serum 0.95 (06-25 @ 05:55)  Creatinine, Serum 0.95 (06-24 @ 05:30)        LIVER FUNCTIONS - ( 26 Jun 2025 05:45 )  Alb: 2.7 g/dL / Pro: 5.8 g/dL / ALK PHOS: 56 U/L / ALT: 14 U/L / AST: 11 U/L / GGT: x           Urinalysis Basic - ( 26 Jun 2025 05:45 )    Color: x / Appearance: x / SG: x / pH: x  Gluc: 99 mg/dL / Ketone: x  / Bili: x / Urobili: x   Blood: x / Protein: x / Nitrite: x   Leuk Esterase: x / RBC: x / WBC x   Sq Epi: x / Non Sq Epi: x / Bacteria: x      ABG - ( 26 Jun 2025 05:31 )  pH, Arterial: 7.42  pH, Blood: x     /  pCO2: 70    /  pO2: 67    / HCO3: 45    / Base Excess: 20.9  /  SaO2: 97.2

## 2025-06-26 NOTE — PROGRESS NOTE ADULT - ASSESSMENT
84 female PMH of A-fib on Eliquis, COPD, CKD, aplastic anemia, polymyalgia rheumatica, on chronic steroids, avascular necrosis of hips, HLD, HTN, DM, recent admission to [facility] 5/26 through 6/5/2025 for CHF/fluid overload/COPD exacerbation, presents to the ED form [facility] for evaluation of fever and generalized feeling of being unwell, found to be septic and hypotensive.     - CT chest 6/10 with multifocal R sided PNA  - hx copd on home o2 , baseline o2 keep > 88%   - S/p bronch, 6/16.  Showed blood oozing from RUL but no lesion.  Still with minimal hemoptysis  - Follow pulmonary and ID recommendations     - on 6/22 had AMS from CO2 narcosis.   - now of of bipap. Repeat Co2 6/26 70  - Compliance with cpap recommended  - monitor ABGs closely     - c/o atypical CP Appears pleuritic  - EKG x 2 showed NSR with PAC, non-ischemic.    - Troponin negative    - Echo 6/12/25 as above EF 67% mild LVH, mod MAC, mild MR, mod to severe pHTN  - No evidence of any meaningful volume overload   - Would hold IV lasix, hypernatremic   - May switch to home torsemide in am pending repeat BMP    - Bp remains stable    - Now off Midodrine and Northera  - known hx of dysautonomia monitor bps closely    - Hx of paroxysmal atrial fibrillation and DVT/PE  - Restarted on ac with Eliquis at 2.5 bid, but now off 2/2 hemoptysis.   - Resume when ok with pulm   - TELE: SR/ A fib/PAT PVC 80-100s nonsustained 130s, 3 beats NSVT  - Can give IV BB if sustains  - Continue Amiodarone 100 mg daily    - Monitor and replete lytes, keep K>4, Mg>2.  - Will continue to follow.    Oliver Olvera, MS FNP, AGACNP  Nurse Practitioner- Cardiology   Please call on TEAMS

## 2025-06-26 NOTE — PROGRESS NOTE ADULT - ASSESSMENT
REASON FOR VISIT  .. Management of problems listed below      EVENTS/CURRENT ISSUES.  . 6/26/2025 No further hemoptysis while off apixaba   . 6/24/2025 tr out of icu   . 6/23/2025 tr icu  . 6/22/2025 worsening gas exchange placed on hfnc apixa stoppd   . 6/20/2025 apixa restartd and noc bpap orderde   . 6/16/2025 fob done oozing rul and l lo lobe post basal   . 6/14/2025 iv ufh dced   . 6/13/2025 continues to have mild hemoptysis but is also on iv ufh drip   . 6/13/2025 dw pt re rbaa of bronch and se agrees scheduled for 6/16 10 in or   . 6/13/2025 dw cardio and id   . 6/11/2025 qft funfitell and galactomannan orderd   . 6/11/2025 pt wa sstarted on iv ufh for hemoptysis and pleuritic chest pain but was stopped when vq showed vlp   . 6/10/2025 Mild hemoptysis   . 6/9 tr out of icu  . 6/8/2025  . PNEUMONIA RML 6/8/2025  . SHOCK 6/8/2025   . NOREPI 6/8/2025   . STRESS STEROIDS   .... HYDROCORTISONE 6/8/2025  . A fib (chronic) POA 6/8/2025  . ANEMIA Hb 6/8/2025 Hb 7.5  . KIMBERLY ON CKD Cr 6/8/2025 Cr 1.9        REVIEW OF SYMPTOMS   Able to give ROS  Yes     RELIABILITY +/-   CONSTITUTIONAL Weakness Yes    ENDOCRINE  No heat or cold intolerance    ALLERGY No hives  Sore throat No stridor  RESP Shortness of breath YES   NEURO New weakness No   CARDIAC   Palpitations No         PHYSICAL EXAM    HEENT Unremarkable  atraumatic   RESP Fair air entry  Harsh breath sound   CARDIAC S1 S2 No S3     NO JVD    ABDOMEN No hepatosplenomegaly   PEDAL EDEMA present No calf tenderness    REASON FOR VISIT  .. Management of problems listed below      CC.   . 6/8/2025 brought in by ems for right sided chest pain that started at 3am- nonradiating- patient was offered aspirin by ems- patient refused and stated to ems that she is on plavix and was advised not to take aspirin. patient on 43 litres nasl cannula-     OVERALL PRESENTATION.  . 6/8/2025 84 yr old female with PMHx afib on eliquis, COPD (not on home O2), PE/Rt lower extremity DVT 2017, Rt LE IVC filter 2017 CKD3, aplastic anemia, polymyalgia rheumatica on prednisone 5 mg po qd, requiring PRBC transfusions ~8 weeks (via Rt subclavian mediport), AVN bilat hips, HTN, HLD, HFpEF (75% 6.2.25), diastolic dysfx grade1, recent hospitalization 5/25/25-6/5/25 for ADHF/COPD exacerbation, Pt with eventual improvement [facility] to E.D. this a.m. 6/8/25 with c/o Rt sided chest pain. general malaise. Pt stated began to feel ill evening before presentation, daughter this a.m. endorsed pt lethargic, pale.     In E.D. Pt found to have fever with tmax of 101, KIMBERLY on CKD with sCr 1.90 (baseline 1.2-1.6), HYPOtnsive with SBP 80's (pt on midodrine therapy, usual 's). In addition found to have leukocytosis of 37.6 (baseline 5.25 6/5/25), procalcitonin of 13.6, Hgb 7.5, elevated INR 2.27,  Chest xray demonstrated RML consolidations, concerning for pneum. In E.D. pt receiving 500 cc's NS, Vanco 1 gm, zosyn. Pt received tylenol 1 gm IV x1.     PMH.      BEST PRACTICE ISSUES.  . HOB ELEVATN.    .... Yes  . DIET  .   .... DASH 6/8/2025   . FREE WATER.   ....   .  IV FLUID.  .... 6/15-6/20/2025 1/2 ns 50   ..... 6/8 lr 40 x 12 h   . PHARMAC DVT PPLX .    .... 6/22 apixa dced suspect hemoptysis   .... 6/20 apixa 2.5 x 2   .... 6/12-6/14/2025 iv ufh (hospitalist)  .... 6/11/2025 [facility] 5k.2   .... 6/10/2025 4:46 PM iv ufh   .... [facility] 6/9-6/10/2025   . NON PHARMAC DVT PPLX .    .... 6/23/2025 compr device   . STRESS ULCR PPLX .   .... PROTONIX 40 6/22/2025   . DATE/DM MGMT.   ..... See under Endocrine section   GENERAL DATA .   . COVID.         .... scv2 6/8/2025 scv2 (-)   . GOC.    ....    . ICU STAY.   .... 6/22 -    .... 6/8-6/9   . INFECTION PPLX .   .... CHLORHEXIDINE 2% 6/8/2025   . ALLGY.   ....  nka  . WT.   .... 6/8/2025 69   . BMI.  ....6/8/2025 26      XXXXXXXXXXXXXXXXXXXXXXX  VITALS/GAS EXCHANGE/DRIPS    ABGS.   6/20/2025 4p 5l 736/71/65   6/22/2025 7p bpap 16/8/1 731/82/153   6/23/2025 11p bpap 12/5/.4 727/92/57  6/23/2025 2a avaps 450/18/8/25/10 .45 740/67/85   6/26/2025 5a 3l 742/70/67   .  VS/ PO/IO/ VENT/ DRIPS.  6/26/2025 afeb 76 120/70   6/26/2025 2.5 l nc 94%     XXXXXXXXXXXXXXXXXXXXXXXXXXXXXXXXXXX  PROBLEM ASSESSMENT RECOMMENDATIONS.  RESP.   . GAS EXCHANGE .   .... target PO 90-95%     . NEED FOR HOME OXYGEN   .... 6/18/2025 will need home oxygen if at discharge pulse ox at rest or on exertion is below 88%     . COPD   .... ADVAIR 500 6/8/2025   .... MONTELUKAST 10 6/8/2025   .... SPIRIVA 6/8/2025   .... nacl 3% bid 6/22/2025   .... HYDROCORT   ........ 50.2 6/24/2025  ....... 25.2 6/25/2025      . RESP FAILURE  .... 6/8/2025 Has ho hypercapnia   .... 6/8/2025 recommend monitor abg  .... 6/18/2025 requiring 4l nc     . HYPERCAPNIC RESP FAILURE WITHOUT ACIDEMIA   6/20/2025 4p 5l 736/71/65   .... 6/20/2025 sterted noct bpap 35/15/6/16  .... 6/23 noct avaps .45 14 epap 8 vt 450 25/10     . MILD HEMOPTYSIS 6/10/2025   .... ct ch 6/10/2025 cw 5/31  ........ new mf infection in rul   ........ unchanged small r greater than left pl effsn   ....... enlarged pulm artery suggesting pulm hytn   .... 6/10/2025  dw hemat cardio id and instead of restarting apixa will start iv UFH which can be easily reversed in case Hb starts dropping but will be the rx for venous thromboembolism as well as for a fib   .... v duplx 6/10 (-)  .... vq 6/10 vlp   .... 6/13/2025 continues to have mild hemoptysis but is also on iv ufh drip   . 6/13/2025 dw pt re rbaa of bronch and se agrees scheduled for 6/16 10 in or   .... 6/13/2025 dw cardio and id   .... 6/16 fob done showed ooze rul and l lo lobe no eb lesions   .... 6/17/2025 continues to have mild hemoptysis   .... 6/17/2025 30% of hemoptysis cases no cause is found  Ordered gbm ab rf dsdna rf apla chapman   .... 6/18/2025 cbmab dsdna inderjit apla ab all (-)   .... 6/12 fungitell galactomannan (-)   .... strep legn ag 6/9 (-)   .... fob 6/16 afb sm (-) cyto (-)  .... 6/18/2025 hemoptysis likely sec to pneumonia in setting of pulm hypertension   .... 6/20/2025 apixa restarted   .... 6/22/2025 apixa dced    . PLEURITIC CHEST PAIN RO VTE   .... v duplx 6/10 (-)  .... vq 6/10 vlp   .... 6/12/2025 iv ufh was stopped 6/11 as vq vlp but was restarted by hospitalist 6/12/2025 for af   .... chest pain resolvd       INFECTION.  . DATA  .... w 6/22-6/23-6/24-6/26/2025   .... w 7.2 - 7.8 - 4.6 - 4.8   .... w 6/8-6/10-6/13-6/14-6/15-6/17-6/18-6/19-6/20/2025   .... w 13.6 - 9.2 - 15.9 - 18- 14 - 9.6 - 11.9- 9.8 - 8.7   .... esr 6/8/2025 esr 17   .... w 6/8-6/9/2025 w 32 - 24   .... ua 6/8/2025 w 4   .... cxr 6/8/2025 cxr dense infiltra r upper hilum r mediport  .... ct ch 6/10 cw 5/31  ........ r greater than l effs   ........ partial rll atelectasis   ........ new patchy and nodular areas of consolidation rul and associated ground glass opacities   ........ ground glass and nodular opac also scott   ........ numerous tib rml and rll     . MICROBIO  .... sp 6/8 n commensals   .... flu ab 6/8/2025 (-)   .... rsv 6/8/2025 (-)    .... mrsa 6/9/2025 (-)  .... uc 6/8 (-)  .... bc 6/8 (-)     . PNEUMONIA RUL 6/8/2025   .... AZITHRO 6/8-6/11 /2025   .... ZOSYN 6/8-6/15/2025     . PNEUMONIA   .... ct ch 6/22/2025 cw 6/10/2025   ....... incr consolidation and ggo rul   ....... new large consolidatn scott and l lo lobe   .... 6/22/2025 zosyn     CARDIAC.  . PAIN CHEST   .... 6/10/2025 co pain chest r   .... 6/10/2025 check ct to see if she has pleurisy   .... 6/10/2025  dw hemat cardio id and instead of restarting apixa will start iv UFH which can be easily reversed in case Hb starts dropping but will be the rx for venous thromboembolism as well as for a fib     . STRESS STEROIDS   .... HYDROCORTISONE   ........ 6/8/2025 h 50.4  ........ 6/10/2025 h 50.2   ........ 6/12/2025 hydrocort 50   ....... 6/14/2025 h 25  ........ 6/17/2025 dced     . SHOCK   .... MIDODRINE 6/8-6/16/2025 m 10.3   .... DROXIDOPA 6/9-6/16/2025 d 100.3   .... NOREPI 6/8-6/9/2025 n 8/250   .... target map 65 (+)     . CAD    .... Trop 6/8-6/9/2025 Tr 32- 24     . LACTICEMIA   .... la 6/8/2025 la 1.4     . CHF  .... pbnp 6/8/2025 pbnp 6599   .... tte 4/2025 mild ph  n vf  .... tte 6/2/2025   ........ ef 71%   ........ n rvsf    ........ pasp 54   ....... la mod dilatd   .... lasix 40/d 6/22/2025  .... torsemide 20 6/17-6/22/2025  .... lasix 40 once 6/14/2025  .... 6/22/2025 lasix 20 once     . A fib (chronic) POA 6/8/2025  .... AMIODARONE 100 6/8/2025  .... 6/10/2025  dw hemat cardio id and instead of restarting apixa will start iv UFH which can be easily reversed in case Hb starts dropping but will be the rx for venous thromboembolism as well as for a fib   .... 6/12-6/14/2025 iv ufh     HEMAT.  . DATA  .... Plt 6/8/2025 plt 153   .... inr 6/8-6/9/2025 inr 2.27- 1.63      . ANEMIA   .... Hb 6/26/2025 Hb 8.3   .... Hb 6/19-6/20-6/21-6/22-6/23-6/24/2025   .... Hb 7.8 - 9.2 - 9.3 - 8.3 - 9.2 - 8.4   .... Hb 6/8-6/9-6/10-6/11-6/13-6/14-6/15-6/17-6/18/2025   .... Hb 7.5- 7.8 - 7.6- 7.1 - 8.4 - 7.3- 9.1 - 8.1 - 8.5    . TRANSFUSION  .... 6/8  1 u prbc    .... 6/14 2 u prbc   .... 6/22/2025 1 u prbc     GI.   . DIET .   .... dash 6/8/2025     . LFT MONITORING   .... LFTS    6/8/2025  ........ AP     39  ........ AST   11  ........ ALT    35    RENAL.  . DATA  .... Na 6/8/2025 Na 137  .... K 6/8/2025 K 4   .... CO2 6/8/2025 co2 29   .... ag 6/8/2025 ag 9  .... alb 6/8/2025 alb 3.4     . KIMBERLY ON CKD   .... Cr 6/8-6/9-6/10-6/11-6/13-6/14/2025   .... Cr 1.9 - 1.4 - 1.3 - 1.3 - 1.1 - 1  .... Cr 5/27-5/28-5/29-5/30-6/1/2025   .... Cr 1.2 - 1.3 - 1.3 - 1.6 - 1.6    . HYPERNATREMIA   .... Na 6/24-6/26/2025 Na 147    ENDO.  . DM  .  .... 6/8/2025 SL SCALE     NEURO.  . PAIN  .... GABAPENTIN 6/8/2025 g 400.2     XXXXXXXXXXXXXXXXXXXXXX   SUMMARY BASELINE .     84 yr old female pt of Dr Gallegos not on home ox or home cpap used to use trelegy lives with spouse quit 40 y 1/2 ppd with PMHx afib on eliquis, COPD (not on home O2), PE/Rt lower extremity DVT 2017, Rt LE IVC filter 2017 CKD3, aplastic anemia, polymyalgia rheumatica on prednisone 5 mg po qd, requiring PRBC transfusions around 8 weeks (via Rt subclavian mediport), AVN bilat hips, HTN, HLD, HFpEF (75% 6.2.25), diastolic dysfx grade1, recent hospitalization 5/25/25-6/5/25 for ADHF/COPD exacerbation, Pt with eventual improvement [facility]   CC.   . 6/8/2025 R CHEST PAIN   MAIN ISSUES.  . COPD 6/23/2025 hydrocort 50.3   . HYPERCAPNIC RESP FAILURE: 6/20/2025 4p 5l 736/71/65 6/23/2025 2a avaps 450/18/8/25/10 .45 740/67/85   .... 6/20/2025 Noct bpap 15/5/35/16 ordrd 6/23 noct avaps .45 14 epap 8 vt 450 25/10   . RESP FAILURE 6/22/2025  HFNC 6/22 5p HFNC 80% 50l po 95% tr icu   . MILD HEMOPTYSOIS 6/10/2025 6/22 apixa stoppd   . SUSPECTED BLEEDING IN [place] CAUSED RESP DECOMPENSATION AND NEW OPACITIES ON 6/22 CT  6/22 622 apixa stoppd   . PNEUMONIA RML 6/8/2025:  ZOSYN 6/8-6/16/2025   . PNEUMONIA: 6/22 ct bl ul opac 6/22/2025 zosyn   . PLEURITIS CHEST PAIN 6/10/2025: 6/10 vte excluded vlp vq   . SHOCK 6/8/2025: tr oo icu 6/9   . NOREPI 6/8-6/9/2025   . STRESS STEROIDS : HYDROCORTISONE 6/8-6/17/2025  . A fib (chronic) POA 6/8/20256/22/2025 6/22 apixa stopped   . CHF: tte 6/2/2025  ef 71%  pasp 54  la mod dilatd 6/22 lasix 40   . ANEMIA Hb 6/8-6/13-6/25/2025 Hb 7.5- 8.4- 8.5  . TRANSFUSION 6/8  1 u prbc  6/22 1 u prbc   . KIMBERLY ON CKD Cr 6/8-6/13/2025 Cr 1.9- 1.1  . HYPERNATREMIA 6/24-6/25/2025 Na 147-146    PMH.   . AFon Eliquis,   . COPD (not on home o2),   . CKD,   . aplastic anemia,   . TRANSFUSION 6/2/2025 1 u prbc   . PMR (chronic prednisone with AVN hip),   . HLD,   . HTN,   . DM    PROCEDURES/DEVICES .  . 6/16/2025 FOB br wash rul ooze rul l lo lobe post basal     PAST PROCEDURES   . r mediport poa 6/8/2025     DISCUSSIONS.  .... Discussed with primary care and relevant consultants on an ongoing basis       TIME SPENT.  . Over 36 minutes aggregate care time spent on encounter; activities included   direct patient care, counseling and/or coordinating care reviewing notes, lab data/ imaging , discussion with multidisciplinary team/ patient  /family and explaining in detail risks, benefits, alternatives  of the recommendations     ROBERT HENRIQUEZ 84 6/8/2025 1942

## 2025-06-26 NOTE — PROGRESS NOTE ADULT - ASSESSMENT
Prerenal azotemia, CKD 3  Dyspnea: Pneumonia, h/o COPD/CHF  s/p Shock   Diabetes  Aplastic anemia, requiring frequent blood transfusions  Hemoptysis    Stable renal indices. To continue current meds. Monitor blood sugar levels. Insulin coverage as needed. PO diuretics.   Trend BP and titrate BP meds as needed. Avoid nephrotoxic meds as possible. Pulmonary follow up. D/c planning.

## 2025-06-26 NOTE — PROGRESS NOTE ADULT - SUBJECTIVE AND OBJECTIVE BOX
[facility] -- Sai Valle, Girish Jackson Savella, Goodger, Cohen: Office # [phone]    Follow Up: Hypotension     Subjective/Observations: Patient seen and examined. Patient awake, alert, resting in bed. No complaints of chest pain, dyspnea, palpitations or dizziness. No signs of orthopnea or PND. Tolerating room air. Rt Chest port in place.     REVIEW OF SYSTEMS: All other review of systems are negative unless indicated above    PAST MEDICAL & SURGICAL HISTORY:  COPD (chronic obstructive pulmonary disease)      DM (diabetes mellitus)  diet-controlled      PAF (paroxysmal atrial fibrillation)  s/p ablation      Hiatal hernia      H/O aplastic anemia      History of IBS      H/O osteoporosis      HLD (hyperlipidemia)      PMR (polymyalgia rheumatica)      History of basal cell cancer      Chronic kidney disease (CKD)      Hypotension      Presence of IVC filter      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      History of immunodeficiency      Lumbar compression fracture      H/O hiatal hernia      H/O pulmonary fibrosis      H/O sinus bradycardia      History of fracture of right hip  "unoperable"      S/P hysterectomy      S/P foot surgery      S/P knee surgery      After cataract  bilateral eye cataract surgically removed      Closed right hip fracture  rigth hip ORIF july 19 2016      S/P IVC filter  nov 2016      S/P carpal tunnel release  Right 2008 & 6/27/17      H/O kyphoplasty      Port-A-Cath in place  right chest          MEDICATIONS  (STANDING):  albuterol/ipratropium for Nebulization 3 milliLiter(s) Nebulizer three times a day  aMIOdarone    Tablet 100 milliGRAM(s) Oral daily  chlorhexidine 2% Cloths 1 Application(s) Topical <User Schedule>  dextrose 5%. 1000 milliLiter(s) (50 mL/Hr) IV Continuous <Continuous>  dextrose 5%. 1000 milliLiter(s) (100 mL/Hr) IV Continuous <Continuous>  dextrose 50% Injectable 12.5 Gram(s) IV Push once  dextrose 50% Injectable 25 Gram(s) IV Push once  dextrose 50% Injectable 25 Gram(s) IV Push once  fluticasone propionate/ salmeterol 500-50 MICROgram(s) Diskus 1 Dose(s) Inhalation two times a day  gabapentin 400 milliGRAM(s) Oral every 12 hours  glucagon  Injectable 1 milliGRAM(s) IntraMuscular once  hydrocortisone sodium succinate Injectable 25 milliGRAM(s) IV Push two times a day  insulin lispro (ADMELOG) corrective regimen sliding scale   SubCutaneous three times a day before meals  pantoprazole  Injectable 40 milliGRAM(s) IV Push daily  piperacillin/tazobactam IVPB.. 3.375 Gram(s) IV Intermittent every 8 hours  sodium chloride 3%  Inhalation 4 milliLiter(s) Inhalation three times a day    MEDICATIONS  (PRN):  acetaminophen     Tablet .. 650 milliGRAM(s) Oral every 6 hours PRN Mild Pain (1 - 3)  dextrose Oral Gel 15 Gram(s) Oral once PRN Blood Glucose LESS THAN 70 milliGRAM(s)/deciliter    Allergies    No Known Allergies    Intolerances      Vital Signs Last 24 Hrs  T(C): 36.5 (26 Jun 2025 05:10), Max: 36.9 (25 Jun 2025 20:30)  T(F): 97.7 (26 Jun 2025 05:10), Max: 98.4 (25 Jun 2025 20:30)  HR: 100 (26 Jun 2025 07:45) (76 - 100)  BP: 126/72 (26 Jun 2025 05:10) (109/65 - 126/72)  BP(mean): --  RR: 18 (26 Jun 2025 05:10) (18 - 18)  SpO2: 94% (26 Jun 2025 07:45) (92% - 96%)    Parameters below as of 26 Jun 2025 07:45  Patient On (Oxygen Delivery Method): nasal cannula      I&O's Summary    25 Jun 2025 07:01  -  26 Jun 2025 07:00  --------------------------------------------------------  IN: 150 mL / OUT: 420 mL / NET: -270 mL    TELE: SR/ A fib PVC 70-100s nonsustained 130s, 3 beats NSVT  PHYSICAL EXAM:  Constitutional: NAD, awake and alert  HEENT: Moist Mucous Membranes, Anicteric  Pulmonary: Non-labored, breath sounds are clear bilaterally, Diminished at bases, No wheezing, rales or rhonchi  Cardiovascular: Regular, S1 and S2, No murmurs, No rubs, gallops or clicks  Gastrointestinal:  soft, nontender, nondistended   Lymph: No peripheral edema. No lymphadenopathy.   Skin: No visible rashes or ulcers.  Psych:  Mood & affect appropriate    LABS: All Labs Reviewed:                        8.3    4.89  )-----------( 176      ( 26 Jun 2025 05:45 )             26.3                         8.5    5.15  )-----------( 186      ( 25 Jun 2025 05:55 )             26.0                         8.4    4.62  )-----------( 197      ( 24 Jun 2025 05:30 )             26.9     26 Jun 2025 05:45    146    |  102    |  33     ----------------------------<  99     3.8     |  41     |  0.95   25 Jun 2025 05:55    146    |  99     |  32     ----------------------------<  94     3.2     |  41     |  0.95   24 Jun 2025 05:30    147    |  104    |  34     ----------------------------<  120    3.8     |  40     |  0.95     Ca    8.9        26 Jun 2025 05:45  Ca    8.7        25 Jun 2025 05:55  Ca    9.6        24 Jun 2025 05:30  Phos  2.4       25 Jun 2025 05:55  Phos  2.9       24 Jun 2025 05:30  Mg     1.6       25 Jun 2025 05:55  Mg     2.0       24 Jun 2025 05:30    TPro  5.8    /  Alb  2.7    /  TBili  0.5    /  DBili  x      /  AST  11     /  ALT  14     /  AlkPhos  56     26 Jun 2025 05:45  TPro  5.8    /  Alb  2.8    /  TBili  0.7    /  DBili  x      /  AST  5      /  ALT  15     /  AlkPhos  56     25 Jun 2025 05:55  TPro  5.6    /  Alb  2.6    /  TBili  0.7    /  DBili  x      /  AST  6      /  ALT  15     /  AlkPhos  53     24 Jun 2025 05:30   LIVER FUNCTIONS - ( 26 Jun 2025 05:45 )  Alb: 2.7 g/dL / Pro: 5.8 g/dL / ALK PHOS: 56 U/L / ALT: 14 U/L / AST: 11 U/L / GGT: x           Troponin I, High Sensitivity Result: 26.1 ng/L (06-19-25 @ 06:55)    12 Lead ECG:   Ventricular Rate 88 BPM    Atrial Rate 88 BPM    P-R Interval 130 ms    QRS Duration 78 ms    Q-T Interval 370 ms    QTC Calculation(Bazett) 447 ms    P Axis 79 degrees    R Axis -48 degrees    T Axis 78 degrees    Diagnosis Line Sinus rhythm with premature atrial complexes  Left anterior fascicular block  Possible Lateral infarct , age undetermined (06-19-25 @ 04:43)      TRANSTHORACIC ECHOCARDIOGRAM REPORT  ________________________________________________________________________________                                      _______       Pt. Name:       MARTINEZ JONES Study Date:    6/12/2025  MRN:            [id number]        YOB: 1940  Accession #:    [id number]       Age:           84 years  Account#:       [id number]      Gender:        F  Heart Rate:                     Height:        62.99 in (160.00 cm)  Rhythm:                         Weight:        147.71 lb (67.00 kg)  Blood Pressure: 109/63 mmHg     BSA/BMI:       1.70 m² / 26.17 kg/m²  ________________________________________________________________________________________  Referring Physician:    [id number] Naresh Vitale  Interpreting Physician: Daniel Jorge M.D.  Primary Sonographer:    Jamal Hayes    CPT:               ECHO TTE WO CON COMP W DOPP - 69918.m  Indication(s):     Shock, unspecified - R57.9  Procedure:         Transthoracic echocardiogram with 2-D, M-mode and complete                     spectral and color flow Doppler.  Ordering Location: ICU1  Admission Status:  Inpatient  Study Information: Image quality for this study is technically difficult.    _______________________________________________________________________________________     CONCLUSIONS:      1. Technically difficult image quality.   2. Left ventricular systolic function is normal with an ejection fraction of 67 % by Arias's method of disks.   3. Mild left ventricular hypertrophy.   4.Normal right ventricular cavity size and probably normal right ventricular systolic function.   5. Tricuspid aortic valve with normal leaflet excursion. There is calcification of the aortic valve leaflets.   6. Moderate mitral valve leaflet calcification.   7. Mild mitral regurgitation.   8. Moderate tricuspid regurgitation.   9. The inferior vena cava is normal in size measuring 1.64 cm in diameter, (normal <2.1cm) with normal inspiratory collapse (normal >50%) consistent with normal right atrial pressure (~3, range 0-5mmHg).  10. Estimated pulmonary artery systolic pressure is 57 mmHg, consistent with moderate-to-severe pulmonary hypertension.  11. Trace pericardial effusion.  12. Right pleural effusion noted.  13. Compared to the transthoracic echocardiogram performed on 6/2/2025, there have been no significant interval changes.    ________________________________________________________________________________________  FINDINGS:     Left Ventricle:  Left ventricular systolic function is normal with a calculated ejection fraction of 67 % by the Arias's biplane method of disks. Mild left ventricular hypertrophy.     Right Ventricle:  The right ventricular cavity is normal in size and right ventricular systolic function is probablynormal. Tricuspid annular plane systolic excursion (TAPSE) is 1.9 cm (normal >=1.7 cm).     Left Atrium:  The left atrium is normal in size with an indexed volume of 29.06 ml/m².     Right Atrium:  The right atrium is normal in size with an indexed volume of 26.00 ml/m² and an indexed area of 9.41 cm²/m².     Interatrial Septum:  The interatrial septum appears intact.     Aortic Valve:  The aortic valve is tricuspid with normal leaflet excursion. There is calcification of the aortic valve leaflets. There is mild thickening of the aortic valve leaflets. There is trace aortic regurgitation.     Mitral Valve:  Structurally normal mitral valve with normal leaflet excursion. There is calcification of the mitral valve annulus. There is moderate leaflet calcification. There is mild mitral regurgitation.     Tricuspid Valve:  The tricuspid valve is structurally normal with normal leaflet excursion. There is moderate tricuspid regurgitation. Estimated pulmonary artery systolic pressure is 57 mmHg,consistent with moderate-to-severe pulmonary hypertension.     Pulmonic Valve:  The pulmonic valve was not well visualized. There is trace pulmonic regurgitation.     Aorta:  The aortic root at the sinuses of Valsalva is normal in size, measuring 3.30 cm (indexed 1.94 cm/m²). The ascending aorta is normal in size, measuring 3.00 cm (indexed 1.76 cm/m²). The aortic arch diameter is normal in size, measuring 2.4 cm (indexed 1.41 cm/m²).     Pericardium:  There is a trace pericardial effusion.     Pleura:  Right pleural effusion noted.     Systemic Veins:  The inferior vena cava is normal in size measuring 1.64 cm in diameter, (normal <2.1cm) with normal inspiratory collapse (normal >50%) consistent with normal right atrial pressure (~3, range 0-5mmHg).  ____________________________________________________________________  QUANTITATIVE DATA:  Left Ventricle Measurements: (Indexed to BSA)     IVSd (2D):   1.2 cm  LVPWd (2D):  1.1 cm  LVIDd (2D):  4.2 cm  LVIDs (2D):  2.4 cm  LV Mass:     169 g  99.5 g/m²  LV Vol d, MOD A2C: 38.9 ml 22.88 ml/m²  LV Vol d, MOD A4C: 40.9 ml 24.06 ml/m²  LV Vol d, MOD BP:  40.9 ml 24.04 ml/m²  LV Vol s, MOD A2C: 11.3 ml 6.65 ml/m²  LV Vol s, MOD A4C: 13.4 ml 7.88 ml/m²  LV Vol s, MOD BP:  13.4 ml 7.91 ml/m²  LVOT SV MOD BP:    27.4 ml  LV EF% MOD BP:     67 %     MV E Vmax:    1.54 m/s  MV A Vmax:    0.74 m/s  MV E/A:       2.08  e' lateral:   11.90 cm/s  e' medial:    8.81 cm/s  E/e' lateral: 12.94  E/e' medial:  17.48  E/e' Average: 14.87  MV DT:272 msec    Aorta Measurements: (Normal range) (Indexed to BSA)     Ao Root d     3.30 cm (2.7 - 3.3 cm) 1.94 cm/m²  Ao Asc d, 2D: 3.00  Ao Asc prox:  3.00 cm                1.76 cm/m²  Ao Arch:      2.4 cm  Left Atrium Measurements: (Indexed to BSA)  LA Diam 2D: 3.60 cm  Right Ventricle Measurements: Right Atrial Measurements:     TAPSE:            1.9 cm      RA Vol s, MOD A4C         44.2 ml  RV Base (RVID1):  2.9 cm      RA Vol s, MOD A4C i BSA   26.00 ml/m²  RV Mid (RVID2): 2.5 cm      RA Area s, MOD A4C        16.0 cm²  RV Major (RVID3): 6.4 cm      RA Area s, MOD A4C, i BSA 9.41 cm²/m²       LVOT / RVOT/ Qp/Qs Data: (Indexed to BSA)  LVOT Diameter,s: 2.20 cm  LVOT Area:       3.80 cm²    Mitral Valve Measurements:     MV E Vmax: 1.5 m/s         MR Vmax:          3.80 m/s  MV A Vmax: 0.7 m/s         MR Peak Gradient: 57.8 mmHg  MV E/A:    2.1       Tricuspid Valve Measurements:     TR Vmax:          3.7 m/s  TR Peak Gradient: 54.5 mmHg  RA Pressure:      3 mmHg  PASP:             57 mmHg    ________________________________________________________________________________________  Electronically signed on 6/13/2025 at 11:19:51 AM by Daniel Jorge M.D.  *** Final ***

## 2025-06-26 NOTE — PROGRESS NOTE ADULT - ASSESSMENT
84F h/o AFib on Eliquis, COPD, CKD, aplastic anemia, polymyalgia rheumatica on chronic steroids, avascular necrosis of hips, HTN, HLD, with recent admission to [facility] 5/26 - 6/5 for acute HFpEF exacerbation and COPD exacerbation, discharged to rehab on 6/5 and returning today with right sided chest pain, general malaise and feeling unwell. She reports some slight cough though otherwise no other new symptoms reported. Found to be hypotensive, febrile w/ leukocytosis. Admitted to ICU for septic shock likely 2/2 to PNA and KIMBERLY. Now stable for downgrade to tele.     Septic shock secondary to PNA   hemoptysis     - s/p bronch with blood in the RUL. AC held.     - weaned off droxidopa/midodrine. Weaning steroids as tolerated. will decrease to solucortef 25mg daily    - on zosyn TID, last dose 6/29.     - Pulm/ID following     Aplastic anemia    - eliquis held. patient had recurrent hemoptysis with evidence of bleeding on bronch. s/p prbc on 6/23.     KIMBERLY/CKD3, prerenal azotemia     - nephrology following     - potassium supplemented     COPD  hypercapnic respiratory failure    - pulm following    - wean NC as tolerated. decreased to 2.5LNC. wean to keep pulse ox >88%     - cont advair, duoneb, hypertonic saline QID     afib     - cardio following.     - cont amiodarone 100mg daily     - start low dose lopressor 12.5 mg BID    - change to home dose torsemide 20mg daily.     PMR    - on chronic prednisone 5mg daily at home. wean stress dose to home dose when able.     HLD    - cont statin     DVT proph: SCDs given hemoptysis    OOB to chair     Discussed with pulm, will need to be off ac for at least a month given recurrence of bleeding.    updated daughter Lenore over the phone

## 2025-06-26 NOTE — PROGRESS NOTE ADULT - SUBJECTIVE AND OBJECTIVE BOX
CHIEF COMPLAINT/ REASON FOR VISIT  .. Patient was seen to address the  issue listed under PROBLEM LIST which is located toward bottom of this note     MARTINEZ PATEL 1EAS 106 W1    Allergies    No Known Allergies    Intolerances        PAST MEDICAL & SURGICAL HISTORY:  COPD (chronic obstructive pulmonary disease)      DM (diabetes mellitus)  diet-controlled      PAF (paroxysmal atrial fibrillation)  s/p ablation      Hiatal hernia      H/O aplastic anemia      History of IBS      H/O osteoporosis      HLD (hyperlipidemia)      PMR (polymyalgia rheumatica)      History of basal cell cancer      Chronic kidney disease (CKD)      Hypotension      Presence of IVC filter      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      History of immunodeficiency      Lumbar compression fracture      H/O hiatal hernia      H/O pulmonary fibrosis      H/O sinus bradycardia      History of fracture of right hip  "unoperable"      S/P hysterectomy      S/P foot surgery      S/P knee surgery      After cataract  bilateral eye cataract surgically removed      Closed right hip fracture  rigth hip ORIF july 19 2016      S/P IVC filter  nov 2016      S/P carpal tunnel release  Right 2008 & 6/27/17      H/O kyphoplasty      Port-A-Cath in place  right chest          FAMILY HISTORY:  Family history of bladder cancer (Mother)    Family history of myocardial infarction (Father)    FH: atrial fibrillation (Father)        Home Medications:  amiodarone 200 mg oral tablet: 0.5 tab(s) orally once a day (08 Jun 2025 16:43)  Bentyl 10 mg oral capsule: 1 cap(s) orally 2 times a day, As Needed (08 Jun 2025 16:43)  Eliquis 2.5 mg oral tablet: 1 tab(s) orally 2 times a day (08 Jun 2025 16:43)  fluticasone-salmeterol 500 mcg-50 mcg/inh inhalation powder: 1 puff(s) inhaled 2 times a day (08 Jun 2025 16:43)  folic acid 1 mg oral tablet: 1 tab(s) orally once a day (in the morning) (08 Jun 2025 16:43)  gabapentin 400 mg oral capsule: 1 cap(s) orally 2 times a day (08 Jun 2025 16:43)  ipratropium-albuterol 0.5 mg-2.5 mg/3 mL inhalation solution: 3 milliliter(s) inhaled every 6 hours As needed Shortness of Breath and/or Wheezing (08 Jun 2025 16:43)  IVIG monthly:  (08 Jun 2025 16:43)  leflunomide 10 mg oral tablet: 1 tab(s) orally once a day (08 Jun 2025 16:43)  midodrine 10 mg oral tablet: 1 tab(s) orally every 8 hours (08 Jun 2025 16:43)  montelukast 10 mg oral tablet: 1 tab(s) orally once a day (08 Jun 2025 16:43)  pantoprazole 40 mg oral delayed release tablet: 1 tab(s) orally once a day (before a meal) (08 Jun 2025 16:43)  predniSONE 5 mg oral tablet: 1 tab(s) orally once a day (08 Jun 2025 16:43)  Procrit 10,000 units/mL preservative-free injectable solution: injectable once a week WEDNESDAY (08 Jun 2025 16:43)  Reclast Infusion , IV x 1 yearly:  (08 Jun 2025 16:43)  simvastatin 10 mg oral tablet: 1 tab(s) orally once a day (at bedtime) (08 Jun 2025 16:43)  tiotropium 2.5 mcg/inh inhalation aerosol: 2 puff(s) inhaled once a day (08 Jun 2025 16:43)  tiZANidine: 2 tab(s) orally once a day (at bedtime) as needed for  muscle spasm (08 Jun 2025 16:43)  torsemide 20 mg oral tablet: 1 tab(s) orally once a day (in the morning) (08 Jun 2025 16:49)      MEDICATIONS  (STANDING):  albuterol/ipratropium for Nebulization 3 milliLiter(s) Nebulizer three times a day  aMIOdarone    Tablet 100 milliGRAM(s) Oral daily  chlorhexidine 2% Cloths 1 Application(s) Topical <User Schedule>  dextrose 5%. 1000 milliLiter(s) (50 mL/Hr) IV Continuous <Continuous>  dextrose 5%. 1000 milliLiter(s) (100 mL/Hr) IV Continuous <Continuous>  dextrose 50% Injectable 12.5 Gram(s) IV Push once  dextrose 50% Injectable 25 Gram(s) IV Push once  dextrose 50% Injectable 25 Gram(s) IV Push once  fluticasone propionate/ salmeterol 500-50 MICROgram(s) Diskus 1 Dose(s) Inhalation two times a day  gabapentin 400 milliGRAM(s) Oral every 12 hours  glucagon  Injectable 1 milliGRAM(s) IntraMuscular once  hydrocortisone sodium succinate Injectable 25 milliGRAM(s) IV Push two times a day  insulin lispro (ADMELOG) corrective regimen sliding scale   SubCutaneous three times a day before meals  pantoprazole  Injectable 40 milliGRAM(s) IV Push daily  piperacillin/tazobactam IVPB.. 3.375 Gram(s) IV Intermittent every 8 hours  sodium chloride 3%  Inhalation 4 milliLiter(s) Inhalation three times a day    MEDICATIONS  (PRN):  acetaminophen     Tablet .. 650 milliGRAM(s) Oral every 6 hours PRN Mild Pain (1 - 3)  dextrose Oral Gel 15 Gram(s) Oral once PRN Blood Glucose LESS THAN 70 milliGRAM(s)/deciliter      Diet, Regular:   DASH/TLC Sodium & Cholesterol Restricted (06-23-25 @ 13:52) [Active]          Vital Signs Last 24 Hrs  T(C): 36.5 (26 Jun 2025 05:10), Max: 36.9 (25 Jun 2025 20:30)  T(F): 97.7 (26 Jun 2025 05:10), Max: 98.4 (25 Jun 2025 20:30)  HR: 76 (26 Jun 2025 05:10) (76 - 96)  BP: 126/72 (26 Jun 2025 05:10) (109/65 - 126/72)  BP(mean): --  RR: 18 (26 Jun 2025 05:10) (18 - 18)  SpO2: 95% (26 Jun 2025 05:10) (92% - 96%)    Parameters below as of 26 Jun 2025 05:10  Patient On (Oxygen Delivery Method): nasal cannula          06-24-25 @ 07:01  -  06-25-25 @ 07:00  --------------------------------------------------------  IN: 100 mL / OUT: 1380 mL / NET: -1280 mL    06-25-25 @ 07:01  -  06-26-25 @ 05:46  --------------------------------------------------------  IN: 150 mL / OUT: 0 mL / NET: 150 mL              LABS:                        8.5    5.15  )-----------( 186      ( 25 Jun 2025 05:55 )             26.0     06-25    146[H]  |  99  |  32[H]  ----------------------------<  94  3.2[L]   |  41[H]  |  0.95    Ca    8.7      25 Jun 2025 05:55  Phos  2.4     06-25  Mg     1.6     06-25    TPro  5.8[L]  /  Alb  2.8[L]  /  TBili  0.7  /  DBili  x   /  AST  5[L]  /  ALT  15  /  AlkPhos  56  06-25      Urinalysis Basic - ( 25 Jun 2025 05:55 )    Color: x / Appearance: x / SG: x / pH: x  Gluc: 94 mg/dL / Ketone: x  / Bili: x / Urobili: x   Blood: x / Protein: x / Nitrite: x   Leuk Esterase: x / RBC: x / WBC x   Sq Epi: x / Non Sq Epi: x / Bacteria: x        ABG - ( 26 Jun 2025 05:31 )  pH, Arterial: 7.42  pH, Blood: x     /  pCO2: 70    /  pO2: 67    / HCO3: 45    / Base Excess: 20.9  /  SaO2: 97.2                WBC:  WBC Count: 5.15 K/uL (06-25 @ 05:55)  WBC Count: 4.62 K/uL (06-24 @ 05:30)  WBC Count: 7.82 K/uL (06-23 @ 16:45)  WBC Count: 7.37 K/uL (06-23 @ 05:20)  WBC Count: 15.13 K/uL (06-22 @ 23:14)  WBC Count: 7.28 K/uL (06-22 @ 07:40)      MICROBIOLOGY:  RECENT CULTURES:  06-22 Sputum Sputum XXXX   Few polymorphonuclear leukocytes per low power field  Few Squamous epithelial cells per low power field  No organisms seen per oil power field   Commensal marilee consistent with body site                    Sodium:  Sodium: 146 mmol/L (06-25 @ 05:55)  Sodium: 147 mmol/L (06-24 @ 05:30)  Sodium: 145 mmol/L (06-23 @ 05:20)  Sodium: 146 mmol/L (06-22 @ 07:40)      0.95 mg/dL 06-25 @ 05:55  0.95 mg/dL 06-24 @ 05:30  1.00 mg/dL 06-23 @ 05:20  0.88 mg/dL 06-22 @ 07:40      Hemoglobin:  Hemoglobin: 8.5 g/dL (06-25 @ 05:55)  Hemoglobin: 8.4 g/dL (06-24 @ 05:30)  Hemoglobin: 9.2 g/dL (06-23 @ 16:45)  Hemoglobin: 7.2 g/dL (06-23 @ 05:20)  Hemoglobin: 8.2 g/dL (06-22 @ 23:14)  Hemoglobin: 7.9 g/dL (06-22 @ 12:30)  Hemoglobin: 8.3 g/dL (06-22 @ 07:40)      Platelets: Platelet Count - Automated: 186 K/uL (06-25 @ 05:55)  Platelet Count - Automated: 197 K/uL (06-24 @ 05:30)  Platelet Count - Automated: 209 K/uL (06-23 @ 16:45)  Platelet Count - Automated: 190 K/uL (06-23 @ 05:20)  Platelet Count - Automated: 259 K/uL (06-22 @ 23:14)  Platelet Count - Automated: 253 K/uL (06-22 @ 07:40)      LIVER FUNCTIONS - ( 25 Jun 2025 05:55 )  Alb: 2.8 g/dL / Pro: 5.8 g/dL / ALK PHOS: 56 U/L / ALT: 15 U/L / AST: 5 U/L / GGT: x             Urinalysis Basic - ( 25 Jun 2025 05:55 )    Color: x / Appearance: x / SG: x / pH: x  Gluc: 94 mg/dL / Ketone: x  / Bili: x / Urobili: x   Blood: x / Protein: x / Nitrite: x   Leuk Esterase: x / RBC: x / WBC x   Sq Epi: x / Non Sq Epi: x / Bacteria: x        RADIOLOGY & ADDITIONAL STUDIES:      MICROBIOLOGY:  RECENT CULTURES:  06-22 Sputum Sputum XXXX   Few polymorphonuclear leukocytes per low power field  Few Squamous epithelial cells per low power field  No organisms seen per oil power field   Commensal marilee consistent with body site

## 2025-06-26 NOTE — PROGRESS NOTE ADULT - SUBJECTIVE AND OBJECTIVE BOX
Patient is a 84y old  Female who presents with a chief complaint of septic shock, PNA (26 Jun 2025 09:51)    INTERVAL HPI/OVERNIGHT EVENTS: Patient was seen and examined. Sitting in chair. On 3.5LNC at bedside. Reports feeling better. vitals reviewed. Overnight had episodes of tachycardia noted overnight with HR 130s.     I&O's Summary    25 Jun 2025 07:01  -  26 Jun 2025 07:00  --------------------------------------------------------  IN: 150 mL / OUT: 420 mL / NET: -270 mL    26 Jun 2025 07:01  -  26 Jun 2025 11:34  --------------------------------------------------------  IN: 300 mL / OUT: 0 mL / NET: 300 mL        LABS:                        8.3    4.89  )-----------( 176      ( 26 Jun 2025 05:45 )             26.3     06-26    146[H]  |  102  |  33[H]  ----------------------------<  99  3.8   |  41[H]  |  0.95    Ca    8.9      26 Jun 2025 05:45  Phos  2.4     06-25  Mg     1.6     06-25    TPro  5.8[L]  /  Alb  2.7[L]  /  TBili  0.5  /  DBili  x   /  AST  11[L]  /  ALT  14  /  AlkPhos  56  06-26      Urinalysis Basic - ( 26 Jun 2025 05:45 )    Color: x / Appearance: x / SG: x / pH: x  Gluc: 99 mg/dL / Ketone: x  / Bili: x / Urobili: x   Blood: x / Protein: x / Nitrite: x   Leuk Esterase: x / RBC: x / WBC x   Sq Epi: x / Non Sq Epi: x / Bacteria: x      CAPILLARY BLOOD GLUCOSE      POCT Blood Glucose.: 130 mg/dL (26 Jun 2025 07:27)  POCT Blood Glucose.: 157 mg/dL (25 Jun 2025 21:41)  POCT Blood Glucose.: 139 mg/dL (25 Jun 2025 16:49)  POCT Blood Glucose.: 122 mg/dL (25 Jun 2025 11:35)    ABG - ( 26 Jun 2025 05:31 )  pH, Arterial: 7.42  pH, Blood: x     /  pCO2: 70    /  pO2: 67    / HCO3: 45    / Base Excess: 20.9  /  SaO2: 97.2      Urinalysis Basic - ( 26 Jun 2025 05:45 )    Color: x / Appearance: x / SG: x / pH: x  Gluc: 99 mg/dL / Ketone: x  / Bili: x / Urobili: x   Blood: x / Protein: x / Nitrite: x   Leuk Esterase: x / RBC: x / WBC x   Sq Epi: x / Non Sq Epi: x / Bacteria: x      MEDICATIONS  (STANDING):  albuterol/ipratropium for Nebulization 3 milliLiter(s) Nebulizer three times a day  aMIOdarone    Tablet 100 milliGRAM(s) Oral daily  chlorhexidine 2% Cloths 1 Application(s) Topical <User Schedule>  dextrose 5%. 1000 milliLiter(s) (50 mL/Hr) IV Continuous <Continuous>  dextrose 5%. 1000 milliLiter(s) (100 mL/Hr) IV Continuous <Continuous>  dextrose 50% Injectable 12.5 Gram(s) IV Push once  dextrose 50% Injectable 25 Gram(s) IV Push once  dextrose 50% Injectable 25 Gram(s) IV Push once  fluticasone propionate/ salmeterol 500-50 MICROgram(s) Diskus 1 Dose(s) Inhalation two times a day  gabapentin 400 milliGRAM(s) Oral every 12 hours  glucagon  Injectable 1 milliGRAM(s) IntraMuscular once  hydrocortisone sodium succinate Injectable 25 milliGRAM(s) IV Push daily  insulin lispro (ADMELOG) corrective regimen sliding scale   SubCutaneous three times a day before meals  pantoprazole  Injectable 40 milliGRAM(s) IV Push daily  piperacillin/tazobactam IVPB.. 3.375 Gram(s) IV Intermittent every 8 hours  sodium chloride 3%  Inhalation 4 milliLiter(s) Inhalation three times a day    MEDICATIONS  (PRN):  acetaminophen     Tablet .. 650 milliGRAM(s) Oral every 6 hours PRN Mild Pain (1 - 3)  dextrose Oral Gel 15 Gram(s) Oral once PRN Blood Glucose LESS THAN 70 milliGRAM(s)/deciliter      REVIEW OF SYSTEMS:  CONSTITUTIONAL: No fever or chills  HEENT:  No headache  RESPIRATORY: No cough, wheezing, or shortness of breath  CARDIOVASCULAR: No chest pain  GASTROINTESTINAL: No abdominal pain, nausea, vomiting, or diarrhea  GENITOURINARY: No dysuria  NEUROLOGICAL: no focal weakness   MUSCULOSKELETAL: no myalgias  PSYCH: no recent changes in mood    RADIOLOGY & ADDITIONAL TESTS:    Imaging Personally Reviewed:  [x] YES  [ ] NO    Consultant(s) Notes Reviewed:  [x] YES  [ ] NO    PHYSICAL EXAM:  T(C): 36.5 (06-26-25 @ 05:10), Max: 36.9 (06-25-25 @ 20:30)  HR: 100 (06-26-25 @ 07:45) (76 - 100)  BP: 126/72 (06-26-25 @ 05:10) (109/65 - 126/72)  RR: 18 (06-26-25 @ 05:10) (18 - 18)  SpO2: 94% (06-26-25 @ 07:45) (92% - 96%)    GENERAL: awake, sitting in chair.   HEENT:  anicteric, moist mucous membranes  CHEST/LUNG:  CTA b/l, no rales, wheezes, or rhonchi  HEART:  RRR, S1, S2  ABDOMEN:  BS+, soft, nontender, nondistended  EXTREMITIES: no edema  NERVOUS SYSTEM: answers questions and follows commands appropriately  PSYCH: normal affect    Care Discussed with Consultants/Other Providers [x] YES  [ ] NO

## 2025-06-27 LAB
ALBUMIN SERPL ELPH-MCNC: 2.8 G/DL — LOW (ref 3.3–5)
ALP SERPL-CCNC: 63 U/L — SIGNIFICANT CHANGE UP (ref 40–120)
ALT FLD-CCNC: 18 U/L — SIGNIFICANT CHANGE UP (ref 12–78)
ANION GAP SERPL CALC-SCNC: 5 MMOL/L — SIGNIFICANT CHANGE UP (ref 5–17)
AST SERPL-CCNC: 21 U/L — SIGNIFICANT CHANGE UP (ref 15–37)
BILIRUB SERPL-MCNC: 0.5 MG/DL — SIGNIFICANT CHANGE UP (ref 0.2–1.2)
BUN SERPL-MCNC: 30 MG/DL — HIGH (ref 7–23)
CALCIUM SERPL-MCNC: 8.5 MG/DL — SIGNIFICANT CHANGE UP (ref 8.5–10.1)
CHLORIDE SERPL-SCNC: 98 MMOL/L — SIGNIFICANT CHANGE UP (ref 96–108)
CO2 SERPL-SCNC: 40 MMOL/L — HIGH (ref 22–31)
CREAT SERPL-MCNC: 1.2 MG/DL — SIGNIFICANT CHANGE UP (ref 0.5–1.3)
EGFR: 45 ML/MIN/1.73M2 — LOW
EGFR: 45 ML/MIN/1.73M2 — LOW
GLUCOSE SERPL-MCNC: 109 MG/DL — HIGH (ref 70–99)
HCT VFR BLD CALC: 28.2 % — LOW (ref 34.5–45)
HGB BLD-MCNC: 8.6 G/DL — LOW (ref 11.5–15.5)
MAGNESIUM SERPL-MCNC: 1.7 MG/DL — SIGNIFICANT CHANGE UP (ref 1.6–2.6)
MCHC RBC-ENTMCNC: 30.2 PG — SIGNIFICANT CHANGE UP (ref 27–34)
MCHC RBC-ENTMCNC: 30.5 G/DL — LOW (ref 32–36)
MCV RBC AUTO: 98.9 FL — SIGNIFICANT CHANGE UP (ref 80–100)
NRBC # BLD AUTO: 0 K/UL — SIGNIFICANT CHANGE UP (ref 0–0)
NRBC # FLD: 0 K/UL — SIGNIFICANT CHANGE UP (ref 0–0)
NRBC BLD AUTO-RTO: 0 /100 WBCS — SIGNIFICANT CHANGE UP (ref 0–0)
PLATELET # BLD AUTO: 178 K/UL — SIGNIFICANT CHANGE UP (ref 150–400)
PMV BLD: 11.6 FL — SIGNIFICANT CHANGE UP (ref 7–13)
POTASSIUM SERPL-MCNC: 3.7 MMOL/L — SIGNIFICANT CHANGE UP (ref 3.5–5.3)
POTASSIUM SERPL-SCNC: 3.7 MMOL/L — SIGNIFICANT CHANGE UP (ref 3.5–5.3)
PROT SERPL-MCNC: 5.8 G/DL — LOW (ref 6–8.3)
RBC # BLD: 2.85 M/UL — LOW (ref 3.8–5.2)
RBC # FLD: 17.3 % — HIGH (ref 10.3–14.5)
SODIUM SERPL-SCNC: 143 MMOL/L — SIGNIFICANT CHANGE UP (ref 135–145)
WBC # BLD: 6.7 K/UL — SIGNIFICANT CHANGE UP (ref 3.8–10.5)
WBC # FLD AUTO: 6.7 K/UL — SIGNIFICANT CHANGE UP (ref 3.8–10.5)

## 2025-06-27 PROCEDURE — 99233 SBSQ HOSP IP/OBS HIGH 50: CPT

## 2025-06-27 PROCEDURE — 99232 SBSQ HOSP IP/OBS MODERATE 35: CPT

## 2025-06-27 RX ORDER — METOPROLOL SUCCINATE 50 MG/1
25 TABLET, EXTENDED RELEASE ORAL
Refills: 0 | Status: DISCONTINUED | OUTPATIENT
Start: 2025-06-27 | End: 2025-06-28

## 2025-06-27 RX ORDER — PREDNISONE 20 MG/1
10 TABLET ORAL DAILY
Refills: 0 | Status: DISCONTINUED | OUTPATIENT
Start: 2025-06-28 | End: 2025-06-28

## 2025-06-27 RX ORDER — MELATONIN 5 MG
5 TABLET ORAL AT BEDTIME
Refills: 0 | Status: DISCONTINUED | OUTPATIENT
Start: 2025-06-27 | End: 2025-07-04

## 2025-06-27 RX ADMIN — TORSEMIDE 20 MILLIGRAM(S): 20 TABLET ORAL at 06:27

## 2025-06-27 RX ADMIN — IPRATROPIUM BROMIDE AND ALBUTEROL SULFATE 3 MILLILITER(S): .5; 2.5 SOLUTION RESPIRATORY (INHALATION) at 14:31

## 2025-06-27 RX ADMIN — AMIODARONE HYDROCHLORIDE 100 MILLIGRAM(S): 50 INJECTION, SOLUTION INTRAVENOUS at 06:34

## 2025-06-27 RX ADMIN — Medication 4 MILLILITER(S): at 14:31

## 2025-06-27 RX ADMIN — Medication 1 APPLICATION(S): at 06:23

## 2025-06-27 RX ADMIN — Medication 4 MILLILITER(S): at 19:41

## 2025-06-27 RX ADMIN — METOPROLOL SUCCINATE 25 MILLIGRAM(S): 50 TABLET, EXTENDED RELEASE ORAL at 18:02

## 2025-06-27 RX ADMIN — GABAPENTIN 400 MILLIGRAM(S): 400 CAPSULE ORAL at 18:00

## 2025-06-27 RX ADMIN — GABAPENTIN 400 MILLIGRAM(S): 400 CAPSULE ORAL at 06:27

## 2025-06-27 RX ADMIN — Medication 4 MILLILITER(S): at 07:38

## 2025-06-27 RX ADMIN — IPRATROPIUM BROMIDE AND ALBUTEROL SULFATE 3 MILLILITER(S): .5; 2.5 SOLUTION RESPIRATORY (INHALATION) at 19:41

## 2025-06-27 RX ADMIN — METOPROLOL SUCCINATE 12.5 MILLIGRAM(S): 50 TABLET, EXTENDED RELEASE ORAL at 07:29

## 2025-06-27 RX ADMIN — Medication 25 GRAM(S): at 22:45

## 2025-06-27 RX ADMIN — Medication 25 GRAM(S): at 06:27

## 2025-06-27 RX ADMIN — Medication 25 GRAM(S): at 13:08

## 2025-06-27 RX ADMIN — IPRATROPIUM BROMIDE AND ALBUTEROL SULFATE 3 MILLILITER(S): .5; 2.5 SOLUTION RESPIRATORY (INHALATION) at 07:38

## 2025-06-27 RX ADMIN — Medication 1 DOSE(S): at 06:23

## 2025-06-27 RX ADMIN — Medication 40 MILLIGRAM(S): at 11:28

## 2025-06-27 RX ADMIN — Medication 1 DOSE(S): at 18:01

## 2025-06-27 RX ADMIN — Medication 25 MILLIGRAM(S): at 06:26

## 2025-06-27 NOTE — PROGRESS NOTE ADULT - SUBJECTIVE AND OBJECTIVE BOX
Patient is a 84y old  Female who presents with a chief complaint of septic shock, PNA (27 Jun 2025 08:55)    INTERVAL HPI/OVERNIGHT EVENTS: Patient was seen and examined. Sitting in chair. On 2.5L NC 96% BP improved. HR was 130 overnight     I&O's Summary    26 Jun 2025 07:01  -  27 Jun 2025 07:00  --------------------------------------------------------  IN: 325 mL / OUT: 0 mL / NET: 325 mL    27 Jun 2025 07:01  -  27 Jun 2025 13:20  --------------------------------------------------------  IN: 100 mL / OUT: 0 mL / NET: 100 mL        LABS:                        8.6    6.70  )-----------( 178      ( 27 Jun 2025 08:20 )             28.2     06-27    143  |  98  |  30[H]  ----------------------------<  109[H]  3.7   |  40[H]  |  1.20    Ca    8.5      27 Jun 2025 08:20  Mg     1.7     06-27    TPro  5.8[L]  /  Alb  2.8[L]  /  TBili  0.5  /  DBili  x   /  AST  21  /  ALT  18  /  AlkPhos  63  06-27      Urinalysis Basic - ( 27 Jun 2025 08:20 )    Color: x / Appearance: x / SG: x / pH: x  Gluc: 109 mg/dL / Ketone: x  / Bili: x / Urobili: x   Blood: x / Protein: x / Nitrite: x   Leuk Esterase: x / RBC: x / WBC x   Sq Epi: x / Non Sq Epi: x / Bacteria: x      CAPILLARY BLOOD GLUCOSE      POCT Blood Glucose.: 119 mg/dL (27 Jun 2025 11:41)  POCT Blood Glucose.: 115 mg/dL (27 Jun 2025 07:52)  POCT Blood Glucose.: 119 mg/dL (26 Jun 2025 22:25)  POCT Blood Glucose.: 114 mg/dL (26 Jun 2025 16:59)    ABG - ( 26 Jun 2025 05:31 )  pH, Arterial: 7.42  pH, Blood: x     /  pCO2: 70    /  pO2: 67    / HCO3: 45    / Base Excess: 20.9  /  SaO2: 97.2        Urinalysis Basic - ( 27 Jun 2025 08:20 )    Color: x / Appearance: x / SG: x / pH: x  Gluc: 109 mg/dL / Ketone: x  / Bili: x / Urobili: x   Blood: x / Protein: x / Nitrite: x   Leuk Esterase: x / RBC: x / WBC x   Sq Epi: x / Non Sq Epi: x / Bacteria: x        MEDICATIONS  (STANDING):  albuterol/ipratropium for Nebulization 3 milliLiter(s) Nebulizer three times a day  aMIOdarone    Tablet 100 milliGRAM(s) Oral daily  chlorhexidine 2% Cloths 1 Application(s) Topical <User Schedule>  dextrose 5%. 1000 milliLiter(s) (50 mL/Hr) IV Continuous <Continuous>  dextrose 5%. 1000 milliLiter(s) (100 mL/Hr) IV Continuous <Continuous>  dextrose 50% Injectable 25 Gram(s) IV Push once  dextrose 50% Injectable 12.5 Gram(s) IV Push once  dextrose 50% Injectable 25 Gram(s) IV Push once  fluticasone propionate/ salmeterol 500-50 MICROgram(s) Diskus 1 Dose(s) Inhalation two times a day  gabapentin 400 milliGRAM(s) Oral every 12 hours  glucagon  Injectable 1 milliGRAM(s) IntraMuscular once  insulin lispro (ADMELOG) corrective regimen sliding scale   SubCutaneous three times a day before meals  metoprolol tartrate 25 milliGRAM(s) Oral two times a day  pantoprazole  Injectable 40 milliGRAM(s) IV Push daily  piperacillin/tazobactam IVPB.. 3.375 Gram(s) IV Intermittent every 8 hours  sodium chloride 3%  Inhalation 4 milliLiter(s) Inhalation three times a day  torsemide 20 milliGRAM(s) Oral daily    MEDICATIONS  (PRN):  acetaminophen     Tablet .. 650 milliGRAM(s) Oral every 6 hours PRN Mild Pain (1 - 3)  dextrose Oral Gel 15 Gram(s) Oral once PRN Blood Glucose LESS THAN 70 milliGRAM(s)/deciliter      REVIEW OF SYSTEMS:  CONSTITUTIONAL: No fever or chills  HEENT:  No headache  RESPIRATORY: No cough, wheezing, or shortness of breath  CARDIOVASCULAR: No chest pain  GASTROINTESTINAL: No abdominal pain, nausea, vomiting, or diarrhea  GENITOURINARY: No dysuria  NEUROLOGICAL: no focal weakness   MUSCULOSKELETAL: no myalgias  PSYCH: no recent changes in mood    RADIOLOGY & ADDITIONAL TESTS:    Imaging Personally Reviewed:  [x] YES  [ ] NO    Consultant(s) Notes Reviewed:  [x] YES  [ ] NO    PHYSICAL EXAM:  T(C): 36.9 (06-27-25 @ 12:32), Max: 37.4 (06-26-25 @ 13:43)  HR: 66 (06-27-25 @ 12:32) (66 - 99)  BP: 111/63 (06-27-25 @ 12:32) (106/61 - 119/65)  RR: 18 (06-27-25 @ 12:32) (18 - 19)  SpO2: 96% (06-27-25 @ 12:32) (93% - 96%)    GENERAL: awake, sitting in chair.   HEENT:  anicteric, moist mucous membranes  CHEST/LUNG:  CTA b/l, no rales, wheezes, or rhonchi  HEART:  RRR, S1, S2  ABDOMEN:  BS+, soft, nontender, nondistended  EXTREMITIES: no edema  NERVOUS SYSTEM: answers questions and follows commands appropriately  PSYCH: normal affect      Care Discussed with Consultants/Other Providers [x] YES  [ ] NO

## 2025-06-27 NOTE — PROGRESS NOTE ADULT - ASSESSMENT
84F h/o AFib on Eliquis, COPD, CKD, aplastic anemia, polymyalgia rheumatica on chronic steroids, avascular necrosis of hips, HTN, HLD, with recent admission to [place] 5/26 - 6/5 for acute HFpEF exacerbation and COPD exacerbation, discharged to rehab on 6/5 and returning today with right sided chest pain, general malaise and feeling unwell. She reports some slight cough though otherwise no other new symptoms reported. Found to be hypotensive, febrile w/ leukocytosis. Admitted to ICU for septic shock likely 2/2 to PNA and KIMBERLY. downgraded to tele     Septic shock secondary to PNA   hemoptysis     - s/p bronch with blood in the RUL. AC held.     - weaned off droxidopa/midodrine. Weaning steroids as tolerated. will decrease to prednisone 10mg daily     - on zosyn TID, last dose 6/29.     - Pulm/ID following     Aplastic anemia    - eliquis held. patient had recurrent hemoptysis with evidence of bleeding on bronch. s/p prbc on 6/23.     KIMBERLY/CKD3, prerenal azotemia     - nephrology following     - potassium supplemented     COPD  hypercapnic respiratory failure    - pulm following    - wean NC as tolerated. decreased to 2.5LNC. wean to keep pulse ox >88%     - cont advair, duoneb, hypertonic saline QID     afib     - cardio following.     - cont amiodarone 100mg daily     - increased to lopressor 25mg BID     - torsemide 20mg daily.     PMR    - on chronic prednisone 5mg daily at home. wean stress dose to home dose when able.     HLD    - cont statin     DVT proph: SCDs given hemoptysis    OOB to chair   spoke with daughter at bedside.

## 2025-06-27 NOTE — PROGRESS NOTE ADULT - ASSESSMENT
REASON FOR VISIT  .. Management of problems listed below      EVENTS/CURRENT ISSUES.  . 6/26/2025 No further hemoptysis while off apixaba   . 6/24/2025 tr out of icu   . 6/23/2025 tr icu  . 6/22/2025 worsening gas exchange placed on hfnc apixa stoppd   . 6/20/2025 apixa restartd and noc bpap orderde   . 6/16/2025 fob done oozing rul and l lo lobe post basal   . 6/14/2025 iv ufh dced   . 6/13/2025 continues to have mild hemoptysis but is also on iv ufh drip   . 6/13/2025 dw pt re rbaa of bronch and se agrees scheduled for 6/16 10 in or   . 6/13/2025 dw cardio and id   . 6/11/2025 qft funfitell and galactomannan orderd   . 6/11/2025 pt wa sstarted on iv ufh for hemoptysis and pleuritic chest pain but was stopped when vq showed vlp   . 6/10/2025 Mild hemoptysis   . 6/9 tr out of icu  . 6/8/2025  . PNEUMONIA RML 6/8/2025  . SHOCK 6/8/2025   . NOREPI 6/8/2025   . STRESS STEROIDS   .... HYDROCORTISONE 6/8/2025  . A fib (chronic) POA 6/8/2025  . ANEMIA Hb 6/8/2025 Hb 7.5  . KIMBERLY ON CKD Cr 6/8/2025 Cr 1.9        REVIEW OF SYMPTOMS   Able to give ROS  Yes     RELIABILITY +/-   CONSTITUTIONAL Weakness Yes    ENDOCRINE  No heat or cold intolerance    ALLERGY No hives  Sore throat No stridor  RESP Shortness of breath YES   NEURO New weakness No   CARDIAC   Palpitations No         PHYSICAL EXAM    HEENT Unremarkable  atraumatic   RESP Fair air entry  Harsh breath sound   CARDIAC S1 S2 No S3     NO JVD    ABDOMEN No hepatosplenomegaly   PEDAL EDEMA present No calf tenderness    REASON FOR VISIT  .. Management of problems listed below      CC.   . 6/8/2025 brought in by ems for right sided chest pain that started at 3am- nonradiating- patient was offered aspirin by ems- patient refused and stated to ems that she is on plavix and was advised not to take aspirin. patient on 43 litres nasl cannula-     OVERALL PRESENTATION.  . 6/8/2025 84 yr old female with PMHx afib on eliquis, COPD (not on home O2), PE/Rt lower extremity DVT 2017, Rt LE IVC filter 2017 CKD3, aplastic anemia, polymyalgia rheumatica on prednisone 5 mg po qd, requiring PRBC transfusions ~8 weeks (via Rt subclavian mediport), AVN bilat hips, HTN, HLD, HFpEF (75% 6.2.25), diastolic dysfx grade1, recent hospitalization 5/25/25-6/5/25 for ADHF/COPD exacerbation, Pt with eventual improvement and transfer to [facility] facility from where she presents to E.D. this a.m. 6/8/25 with c/o Rt sided chest pain. general malaise. Pt stated began to feel ill evening before presentation, daughter this a.m. endorsed pt lethargic, pale.     In E.D. Pt found to have fever with tmax of 101, KIMBERLY on CKD with sCr 1.90 (baseline 1.2-1.6), HYPOtnsive with SBP 80's (pt on midodrine therapy, usual 's). In addition found to have leukocytosis of 37.6 (baseline 5.25 6/5/25), procalcitonin of 13.6, Hgb 7.5, elevated INR 2.27,  Chest xray demonstrated RML consolidations, concerning for pneum. In E.D. pt receiving 500 cc's NS, Vanco 1 gm, zosyn. Pt received tylenol 1 gm IV x1.       BEST PRACTICE ISSUES.  . HOB ELEVATN.    .... Yes  . DIET  .   .... DASH 6/8/2025   . FREE WATER.   ....   .  IV FLUID.  .... 6/15-6/20/2025 1/2 ns 50   ..... 6/8 lr 40 x 12 h   . PHARMAC DVT PPLX .    .... 6/22 apixa dced suspect hemoptysis   .... 6/20 apixa 2.5 x 2   .... 6/12-6/14/2025 iv ufh (hospitalist)  .... 6/11/2025 [facility] 5k.2   .... 6/10/2025 4:46 PM iv ufh   .... [facility] 6/9-6/10/2025   . NON PHARMAC DVT PPLX .    .... 6/23/2025 compr device   . STRESS ULCR PPLX .   .... PROTONIX 40 6/22/2025   . DATE/DM MGMT.   ..... See under Endocrine section   GENERAL DATA .   . COVID.         .... scv2 6/8/2025 scv2 (-)   . GOC.    ....    . ICU STAY.   .... 6/22 -    .... 6/8-6/9   . INFECTION PPLX .   .... CHLORHEXIDINE 2% 6/8/2025   . ALLGY.   ....  nka  . WT.   .... 6/8/2025 69   . BMI.  ....6/8/2025 26      [id number]  VITALS/GAS EXCHANGE/DRIPS    ABGS.   6/20/2025 4p 5l 736/71/65   6/22/2025 7p bpap 16/8/1 731/82/153   6/23/2025 11p bpap 12/5/.4 727/92/57  6/23/2025 2a avaps 450/18/8/25/10 .45 740/67/85   6/26/2025 5a 3l 742/70/67   .  VS/ PO/IO/ VENT/ DRIPS.  6/27/2025 afeb 71 99/48   6/27/2025 3l 94%     XXXXXXXX[id number]  PROBLEM ASSESSMENT RECOMMENDATIONS.  RESP.   . GAS EXCHANGE .   .... target PO 90-95%     . NEED FOR HOME OXYGEN   .... 6/18/2025 will need home oxygen if at discharge pulse ox at rest or on exertion is below 88%     . COPD   .... DUONEB.3 6/25   .... ADVAIR 500 6/8/2025   .... MONTELUKAST 10 6/8/2025   .... SPIRIVA 6/8/2025   .... nacl 3% bid 6/22/2025   .... HYDROCORT   ........ 50.2 6/24/2025  ....... 25.2 6/25/2025      . RESP FAILURE  .... 6/8/2025 Has ho hypercapnia   .... 6/8/2025 recommend monitor abg  .... 6/18/2025 requiring 4l nc     . HYPERCAPNIC RESP FAILURE WITHOUT ACIDEMIA   6/20/2025 4p 5l 736/71/65   .... 6/20/2025 sterted noct bpap 35/15/6/16  .... 6/23 noct avaps .45 14 epap 8 vt 450 25/10     . MILD HEMOPTYSIS 6/10/2025   .... ct ch 6/10/2025 cw 5/31  ........ new mf infection in rul   ........ unchanged small r greater than left pl effsn   ....... enlarged pulm artery suggesting pulm hytn   .... 6/10/2025  dw hemat cardio id and instead of restarting apixa will start iv UFH which can be easily reversed in case Hb starts dropping but will be the rx for venous thromboembolism as well as for a fib   .... v duplx 6/10 (-)  .... vq 6/10 vlp   .... 6/13/2025 continues to have mild hemoptysis but is also on iv ufh drip   . 6/13/2025 dw pt re rbaa of bronch and se agrees scheduled for 6/16 10 in or   .... 6/13/2025 dw cardio and id   .... 6/16 fob done showed ooze rul and l lo lobe no eb lesions   .... 6/17/2025 continues to have mild hemoptysis   .... 6/17/2025 30% of hemoptysis cases no cause is found  Ordered gbm ab rf dsdna rf apla chapman   .... 6/18/2025 cbmab dsdna inderjit apla ab all (-)   .... 6/12 fungitell galactomannan (-)   .... strep legn ag 6/9 (-)   .... fob 6/16 afb sm (-) cyto (-)  .... 6/18/2025 hemoptysis likely sec to pneumonia in setting of pulm hypertension   .... 6/20/2025 apixa restarted   .... 6/22/2025 apixa dced    . PLEURITIC CHEST PAIN RO VTE   .... v duplx 6/10 (-)  .... vq 6/10 vlp   .... 6/12/2025 iv ufh was stopped 6/11 as vq vlp but was restarted by hospitalist 6/12/2025 for af   .... chest pain resolvd       INFECTION.  . DATA  .... w 6/22-6/23-6/24-6/26/2025   .... w 7.2 - 7.8 - 4.6 - 4.8   .... w 6/8-6/10-6/13-6/14-6/15-6/17-6/18-6/19-6/20/2025   .... w 13.6 - 9.2 - 15.9 - 18- 14 - 9.6 - 11.9- 9.8 - 8.7   .... esr 6/8/2025 esr 17   .... w 6/8-6/9/2025 w 32 - 24   .... ua 6/8/2025 w 4   .... cxr 6/8/2025 cxr dense infiltra r upper hilum r mediport  .... ct ch 6/10 cw 5/31  ........ r greater than l effs   ........ partial rll atelectasis   ........ new patchy and nodular areas of consolidation rul and associated ground glass opacities   ........ ground glass and nodular opac also scott   ........ numerous tib rml and rll     . MICROBIO  .... sp 6/8 n commensals   .... flu ab 6/8/2025 (-)   .... rsv 6/8/2025 (-)    .... mrsa 6/9/2025 (-)  .... uc 6/8 (-)  .... bc 6/8 (-)     . PNEUMONIA RUL 6/8/2025   .... AZITHRO 6/8-6/11 /2025   .... ZOSYN 6/8-6/15/2025     . PNEUMONIA   .... ct ch 6/22/2025 cw 6/10/2025   ....... incr consolidation and ggo rul   ....... new large consolidatn scott and l lo lobe   .... 6/22/2025 zosyn     CARDIAC.  . PAIN CHEST   .... 6/10/2025 co pain chest r   .... 6/10/2025 check ct to see if she has pleurisy   .... 6/10/2025  dw hemat cardio id and instead of restarting apixa will start iv UFH which can be easily reversed in case Hb starts dropping but will be the rx for venous thromboembolism as well as for a fib     . STRESS STEROIDS   .... HYDROCORTISONE   ........ 6/8/2025 h 50.4  ........ 6/10/2025 h 50.2   ........ 6/12/2025 hydrocort 50   ....... 6/14/2025 h 25  ........ 6/17/2025 dced     . SHOCK   .... MIDODRINE 6/8-6/16/2025 m 10.3   .... DROXIDOPA 6/9-6/16/2025 d 100.3   .... NOREPI 6/8-6/9/2025 n 8/250   .... target map 65 (+)     . CAD    .... Trop 6/8-6/9/2025 Tr 32- 24   .... metoprolol 25.2 6/27/2025     . LACTICEMIA   .... la 6/8/2025 la 1.4     . CHF  .... pbnp 6/8/2025 pbnp 6599   .... tte 4/2025 mild ph  n vf  .... tte 6/2/2025   ........ ef 71%   ........ n rvsf    ........ pasp 54   ....... la mod dilatd   .... lasix 40/d 6/22/2025  .... torsemide 20 6/17-6/22/2025  .... lasix 40 once 6/14/2025  .... 6/22/2025 lasix 20 once     . A fib (chronic) POA 6/8/2025  .... AMIODARONE 100 6/8/2025  .... 6/10/2025  dw hemat cardio id and instead of restarting apixa will start iv UFH which can be easily reversed in case Hb starts dropping but will be the rx for venous thromboembolism as well as for a fib   .... 6/12-6/14/2025 iv ufh     HEMAT.  . DATA  .... Plt 6/8/2025 plt 153   .... inr 6/8-6/9/2025 inr 2.27- 1.63      . ANEMIA   .... Hb 6/26-8/27/2025 Hb 8.3 - 8.6   .... Hb 6/19-6/20-6/21-6/22-6/23-6/24/2025   .... Hb 7.8 - 9.2 - 9.3 - 8.3 - 9.2 - 8.4   .... Hb 6/8-6/9-6/10-6/11-6/13-6/14-6/15-6/17-6/18/2025   .... Hb 7.5- 7.8 - 7.6- 7.1 - 8.4 - 7.3- 9.1 - 8.1 - 8.5    . TRANSFUSION  .... 6/8  1 u prbc    .... 6/14 2 u prbc   .... 6/22/2025 1 u prbc     GI.   . DIET .   .... dash 6/8/2025     . LFT MONITORING   .... LFTS    6/8/2025  ........ AP     39  ........ AST   11  ........ ALT    35    RENAL.  . DATA  .... Na 6/8/2025 Na 137  .... K 6/8/2025 K 4   .... CO2 6/8/2025 co2 29   .... ag 6/8/2025 ag 9  .... alb 6/8/2025 alb 3.4     . KIMBERLY ON CKD   .... Cr 6/8-6/9-6/10-6/11-6/13-6/14/2025   .... Cr 1.9 - 1.4 - 1.3 - 1.3 - 1.1 - 1  .... Cr 5/27-5/28-5/29-5/30-6/1/2025   .... Cr 1.2 - 1.3 - 1.3 - 1.6 - 1.6    . HYPERNATREMIA   .... Na 6/24-6/26/2025 Na 147    ENDO.  . DM  .  .... 6/8/2025 SL SCALE     NEURO.  . PAIN  .... GABAPENTIN 6/8/2025 g 400.2       [id number]   SUMMARY BASELINE .     84 yr old female pt of Dr Gallegos not on home ox or home cpap used to use trelegy lives with spouse quit 40 y 1/2 ppd with PMHx afib on eliquis, COPD (not on home O2), PE/Rt lower extremity DVT 2017, Rt LE IVC filter 2017 CKD3, aplastic anemia, polymyalgia rheumatica on prednisone 5 mg po qd, requiring PRBC transfusions around 8 weeks (via Rt subclavian mediport), AVN bilat hips, HTN, HLD, HFpEF (75% 6.2.25), diastolic dysfx grade1, recent hospitalization 5/25/25-6/5/25 for ADHF/COPD exacerbation, Pt with eventual improvement and transfer to [facility] facility   CC.   . 6/8/2025 R CHEST PAIN   MAIN ISSUES.  . COPD 6/23/2025 hydrocort 50.3 6/27/2025 pred 10   . HYPERCAPNIC RESP FAILURE: 6/20/2025 4p 5l 736/71/65 6/23/2025 2a avaps 450/18/8/25/10 .45 740/67/85   .... 6/20/2025 Noct bpap 15/5/35/16 ordrd 6/23 noct avaps .45 14 epap 8 vt 450 25/10   . RESP FAILURE 6/22/2025  HFNC 6/22 5p HFNC 80% 50l po 95% tr icu   . MILD HEMOPTYSOIS 6/10/2025 6/22 apixa stoppd   . SUSPECTED BLEEDING IN [place] CAUSED RESP DECOMPENSATION AND NEW OPACITIES ON 6/22 CT  6/22 622 apixa stoppd   . PNEUMONIA RML 6/8/2025:  ZOSYN 6/8-6/16/2025   . PNEUMONIA: 6/22 ct bl ul opac 6/22/2025 zosyn   . PLEURITIS CHEST PAIN 6/10/2025: 6/10 vte excluded vlp vq   . SHOCK 6/8/2025: tr oo icu 6/9   . NOREPI 6/8-6/9/2025   . STRESS STEROIDS : HYDROCORTISONE 6/8-6/17/2025  . A fib (chronic) POA 6/8/20256/22/2025 6/22 apixa stopped   . CHF: tte 6/2/2025  ef 71%  pasp 54  la mod dilatd 6/22 lasix 40   . ANEMIA Hb 6/8-6/13-6/25/2025 Hb 7.5- 8.4- 8.5  . TRANSFUSION 6/8  1 u prbc  6/22 1 u prbc   . KIMBERLY ON CKD Cr 6/8-6/13/2025 Cr 1.9- 1.1  . HYPERNATREMIA 6/24-6/25/2025 Na 147-146    PMH.   . AFon Eliquis,   . COPD (not on home o2),   . CKD,   . aplastic anemia,   . TRANSFUSION 6/2/2025 1 u prbc   . PMR (chronic prednisone with AVN hip),   . HLD,   . HTN,   . DM    PROCEDURES/DEVICES .  . 6/16/2025 FOB br wash rul ooze rul l lo lobe post basal     PAST PROCEDURES   . r mediport poa 6/8/2025     DISCUSSIONS.  .... Discussed with primary care and relevant consultants on an ongoing basis       TIME SPENT.  . Over 36 minutes aggregate care time spent on encounter; activities included   direct patient care, counseling and/or coordinating care reviewing notes, lab data/ imaging , discussion with multidisciplinary team/ patient  /family and explaining in detail risks, benefits, alternatives  of the recommendations     ROBERT Stroud 6/8/2025 1942

## 2025-06-27 NOTE — PROGRESS NOTE ADULT - SUBJECTIVE AND OBJECTIVE BOX
CHIEF COMPLAINT/ REASON FOR VISIT  .. Patient was seen to address the  issue listed under PROBLEM LIST which is located toward bottom of this note     MARTINEZ PATEL 1EAS 106 W1    Allergies    No Known Allergies    Intolerances        PAST MEDICAL & SURGICAL HISTORY:  COPD (chronic obstructive pulmonary disease)      DM (diabetes mellitus)  diet-controlled      PAF (paroxysmal atrial fibrillation)  s/p ablation      Hiatal hernia      H/O aplastic anemia      History of IBS      H/O osteoporosis      HLD (hyperlipidemia)      PMR (polymyalgia rheumatica)      History of basal cell cancer      Chronic kidney disease (CKD)      Hypotension      Presence of IVC filter      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      History of immunodeficiency      Lumbar compression fracture      H/O hiatal hernia      H/O pulmonary fibrosis      H/O sinus bradycardia      History of fracture of right hip  "unoperable"      S/P hysterectomy      S/P foot surgery      S/P knee surgery      After cataract  bilateral eye cataract surgically removed      Closed right hip fracture  rigth hip ORIF july 19 2016      S/P IVC filter  nov 2016      S/P carpal tunnel release  Right 2008 & 6/27/17      H/O kyphoplasty      Port-A-Cath in place  right chest          FAMILY HISTORY:  Family history of bladder cancer (Mother)    Family history of myocardial infarction (Father)    FH: atrial fibrillation (Father)        Home Medications:  amiodarone 200 mg oral tablet: 0.5 tab(s) orally once a day (08 Jun 2025 16:43)  Bentyl 10 mg oral capsule: 1 cap(s) orally 2 times a day, As Needed (08 Jun 2025 16:43)  Eliquis 2.5 mg oral tablet: 1 tab(s) orally 2 times a day (08 Jun 2025 16:43)  fluticasone-salmeterol 500 mcg-50 mcg/inh inhalation powder: 1 puff(s) inhaled 2 times a day (08 Jun 2025 16:43)  folic acid 1 mg oral tablet: 1 tab(s) orally once a day (in the morning) (08 Jun 2025 16:43)  gabapentin 400 mg oral capsule: 1 cap(s) orally 2 times a day (08 Jun 2025 16:43)  ipratropium-albuterol 0.5 mg-2.5 mg/3 mL inhalation solution: 3 milliliter(s) inhaled every 6 hours As needed Shortness of Breath and/or Wheezing (08 Jun 2025 16:43)  IVIG monthly:  (08 Jun 2025 16:43)  leflunomide 10 mg oral tablet: 1 tab(s) orally once a day (08 Jun 2025 16:43)  midodrine 10 mg oral tablet: 1 tab(s) orally every 8 hours (08 Jun 2025 16:43)  montelukast 10 mg oral tablet: 1 tab(s) orally once a day (08 Jun 2025 16:43)  pantoprazole 40 mg oral delayed release tablet: 1 tab(s) orally once a day (before a meal) (08 Jun 2025 16:43)  predniSONE 5 mg oral tablet: 1 tab(s) orally once a day (08 Jun 2025 16:43)  Procrit 10,000 units/mL preservative-free injectable solution: injectable once a week WEDNESDAY (08 Jun 2025 16:43)  Reclast Infusion , IV x 1 yearly:  (08 Jun 2025 16:43)  simvastatin 10 mg oral tablet: 1 tab(s) orally once a day (at bedtime) (08 Jun 2025 16:43)  tiotropium 2.5 mcg/inh inhalation aerosol: 2 puff(s) inhaled once a day (08 Jun 2025 16:43)  tiZANidine: 2 tab(s) orally once a day (at bedtime) as needed for  muscle spasm (08 Jun 2025 16:43)  torsemide 20 mg oral tablet: 1 tab(s) orally once a day (in the morning) (08 Jun 2025 16:49)      MEDICATIONS  (STANDING):  albuterol/ipratropium for Nebulization 3 milliLiter(s) Nebulizer three times a day  aMIOdarone    Tablet 100 milliGRAM(s) Oral daily  chlorhexidine 2% Cloths 1 Application(s) Topical <User Schedule>  dextrose 5%. 1000 milliLiter(s) (50 mL/Hr) IV Continuous <Continuous>  dextrose 5%. 1000 milliLiter(s) (100 mL/Hr) IV Continuous <Continuous>  dextrose 50% Injectable 25 Gram(s) IV Push once  dextrose 50% Injectable 12.5 Gram(s) IV Push once  dextrose 50% Injectable 25 Gram(s) IV Push once  fluticasone propionate/ salmeterol 500-50 MICROgram(s) Diskus 1 Dose(s) Inhalation two times a day  gabapentin 400 milliGRAM(s) Oral every 12 hours  glucagon  Injectable 1 milliGRAM(s) IntraMuscular once  hydrocortisone sodium succinate Injectable 25 milliGRAM(s) IV Push daily  insulin lispro (ADMELOG) corrective regimen sliding scale   SubCutaneous three times a day before meals  metoprolol tartrate 12.5 milliGRAM(s) Oral two times a day  pantoprazole  Injectable 40 milliGRAM(s) IV Push daily  piperacillin/tazobactam IVPB.. 3.375 Gram(s) IV Intermittent every 8 hours  sodium chloride 3%  Inhalation 4 milliLiter(s) Inhalation three times a day  torsemide 20 milliGRAM(s) Oral daily    MEDICATIONS  (PRN):  acetaminophen     Tablet .. 650 milliGRAM(s) Oral every 6 hours PRN Mild Pain (1 - 3)  dextrose Oral Gel 15 Gram(s) Oral once PRN Blood Glucose LESS THAN 70 milliGRAM(s)/deciliter      Diet, Regular:   DASH/TLC Sodium & Cholesterol Restricted (06-23-25 @ 13:52) [Active]          Vital Signs Last 24 Hrs  T(C): 36.8 (27 Jun 2025 05:14), Max: 37.4 (26 Jun 2025 13:43)  T(F): 98.2 (27 Jun 2025 05:14), Max: 99.4 (26 Jun 2025 13:43)  HR: 84 (27 Jun 2025 05:14) (84 - 100)  BP: 119/65 (27 Jun 2025 05:14) (106/61 - 119/65)  BP(mean): --  RR: 19 (27 Jun 2025 05:14) (18 - 19)  SpO2: 94% (27 Jun 2025 05:14) (93% - 96%)    Parameters below as of 27 Jun 2025 05:14  Patient On (Oxygen Delivery Method): nasal cannula          06-25-25 @ 07:01  -  06-26-25 @ 07:00  --------------------------------------------------------  IN: 150 mL / OUT: 420 mL / NET: -270 mL    06-26-25 @ 07:01  -  06-27-25 @ 06:17  --------------------------------------------------------  IN: 300 mL / OUT: 0 mL / NET: 300 mL              LABS:                        8.3    4.89  )-----------( 176      ( 26 Jun 2025 05:45 )             26.3     06-26    146[H]  |  102  |  33[H]  ----------------------------<  99  3.8   |  41[H]  |  0.95    Ca    8.9      26 Jun 2025 05:45    TPro  5.8[L]  /  Alb  2.7[L]  /  TBili  0.5  /  DBili  x   /  AST  11[L]  /  ALT  14  /  AlkPhos  56  06-26      Urinalysis Basic - ( 26 Jun 2025 05:45 )    Color: x / Appearance: x / SG: x / pH: x  Gluc: 99 mg/dL / Ketone: x  / Bili: x / Urobili: x   Blood: x / Protein: x / Nitrite: x   Leuk Esterase: x / RBC: x / WBC x   Sq Epi: x / Non Sq Epi: x / Bacteria: x        ABG - ( 26 Jun 2025 05:31 )  pH, Arterial: 7.42  pH, Blood: x     /  pCO2: 70    /  pO2: 67    / HCO3: 45    / Base Excess: 20.9  /  SaO2: 97.2                WBC:  WBC Count: 4.89 K/uL (06-26 @ 05:45)  WBC Count: 5.15 K/uL (06-25 @ 05:55)  WBC Count: 4.62 K/uL (06-24 @ 05:30)  WBC Count: 7.82 K/uL (06-23 @ 16:45)      MICROBIOLOGY:  RECENT CULTURES:  06-22 Sputum Sputum XXXX   Few polymorphonuclear leukocytes per low power field  Few Squamous epithelial cells per low power field  No organisms seen per oil power field   Commensal marilee consistent with body site                    Sodium:  Sodium: 146 mmol/L (06-26 @ 05:45)  Sodium: 146 mmol/L (06-25 @ 05:55)  Sodium: 147 mmol/L (06-24 @ 05:30)      0.95 mg/dL 06-26 @ 05:45  0.95 mg/dL 06-25 @ 05:55  0.95 mg/dL 06-24 @ 05:30      Hemoglobin:  Hemoglobin: 8.3 g/dL (06-26 @ 05:45)  Hemoglobin: 8.5 g/dL (06-25 @ 05:55)  Hemoglobin: 8.4 g/dL (06-24 @ 05:30)  Hemoglobin: 9.2 g/dL (06-23 @ 16:45)      Platelets: Platelet Count - Automated: 176 K/uL (06-26 @ 05:45)  Platelet Count - Automated: 186 K/uL (06-25 @ 05:55)  Platelet Count - Automated: 197 K/uL (06-24 @ 05:30)  Platelet Count - Automated: 209 K/uL (06-23 @ 16:45)      LIVER FUNCTIONS - ( 26 Jun 2025 05:45 )  Alb: 2.7 g/dL / Pro: 5.8 g/dL / ALK PHOS: 56 U/L / ALT: 14 U/L / AST: 11 U/L / GGT: x             Urinalysis Basic - ( 26 Jun 2025 05:45 )    Color: x / Appearance: x / SG: x / pH: x  Gluc: 99 mg/dL / Ketone: x  / Bili: x / Urobili: x   Blood: x / Protein: x / Nitrite: x   Leuk Esterase: x / RBC: x / WBC x   Sq Epi: x / Non Sq Epi: x / Bacteria: x        RADIOLOGY & ADDITIONAL STUDIES:      MICROBIOLOGY:  RECENT CULTURES:  06-22 Sputum Sputum XXXX   Few polymorphonuclear leukocytes per low power field  Few Squamous epithelial cells per low power field  No organisms seen per oil power field   Commensal marilee consistent with body site

## 2025-06-27 NOTE — SOCIAL WORK PROGRESS NOTE - NSSWPROGRESSNOTE_GEN_ALL_CORE
all documents sent to excel for possibly dc over weekend, should pt be medically cleared. sw to follow w medical team for clearance. sw following.

## 2025-06-27 NOTE — PROGRESS NOTE ADULT - ASSESSMENT
84 female PMH of A-fib on Eliquis, COPD, CKD, aplastic anemia, polymyalgia rheumatica, on chronic steroids, avascular necrosis of hips, HLD, HTN, DM, recent admission to [facility] 5/26 through 6/5/2025 for CHF/fluid overload/COPD exacerbation, presents to the ED form [facility] for evaluation of fever and generalized feeling of being unwell, found to be septic and hypotensive.     - CT chest 6/10 with multifocal R sided PNA  - hx copd on home o2 , baseline o2 keep > 88%   - S/p bronch, 6/16.  Showed blood oozing from RUL but no lesion.  Still with minimal hemoptysis  - Follow pulmonary and ID recommendations     - on 6/22 had AMS from CO2 narcosis.   - now of of bipap. Repeat Co2 6/26 70  - Compliance with cpap recommended  - monitor ABGs closely     - c/o atypical CP Appears pleuritic  - EKG x 2 showed NSR with PAC, non-ischemic.    - Troponin negative    - Echo 6/12/25 as above EF 67% mild LVH, mod MAC, mild MR, mod to severe pHTN  - No evidence of any meaningful volume overload   - Continue home torsemide     - Bp remains stable    - Now off Midodrine and Northera  - known hx of dysautonomia monitor bps closely    - Hx of paroxysmal atrial fibrillation and DVT/PE  - Restarted on AC with Eliquis at 2.5 bid, but now off 2/2 hemoptysis.   - Per pulm, will need to be off ac for at least a month given recurrence of bleeding.  - TELE: SR PVC, PAC 70-100s nonsustained PAT 130s  - Started BB, can up titrate as tolerated   - Continue Amiodarone 100 mg daily    - BP stable and controlled     - Monitor and replete lytes, keep K>4, Mg>2.  - Will continue to follow.    Oliver Olvera, MS FNP, AGACNP  Nurse Practitioner- Cardiology   Please call on TEAMS

## 2025-06-27 NOTE — PROGRESS NOTE ADULT - SUBJECTIVE AND OBJECTIVE BOX
[facility] -- Sai Valle, Girish Jackson Savella, David López: Office # [phone]    Follow Up: Hypotension     Subjective/Observations: Patient seen and examined. Patient awake, alert, resting in bed. No complaints of chest pain, dyspnea, palpitations or dizziness. No signs of orthopnea or PND. Tolerating O2 via nasal cannula. Rt Chest port in place.     REVIEW OF SYSTEMS: All other review of systems are negative unless indicated above    PAST MEDICAL & SURGICAL HISTORY:  COPD (chronic obstructive pulmonary disease)      DM (diabetes mellitus)  diet-controlled      PAF (paroxysmal atrial fibrillation)  s/p ablation      Hiatal hernia      H/O aplastic anemia      History of IBS      H/O osteoporosis      HLD (hyperlipidemia)      PMR (polymyalgia rheumatica)      History of basal cell cancer      Chronic kidney disease (CKD)      Hypotension      Presence of IVC filter      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      History of immunodeficiency      Lumbar compression fracture      H/O hiatal hernia      H/O pulmonary fibrosis      H/O sinus bradycardia      History of fracture of right hip  "unoperable"      S/P hysterectomy      S/P foot surgery      S/P knee surgery      After cataract  bilateral eye cataract surgically removed      Closed right hip fracture  rigth hip ORIF july 19 2016      S/P IVC filter  nov 2016      S/P carpal tunnel release  Right 2008 & 6/27/17      H/O kyphoplasty      Port-A-Cath in place  right chest          MEDICATIONS  (STANDING):  albuterol/ipratropium for Nebulization 3 milliLiter(s) Nebulizer three times a day  aMIOdarone    Tablet 100 milliGRAM(s) Oral daily  chlorhexidine 2% Cloths 1 Application(s) Topical <User Schedule>  dextrose 5%. 1000 milliLiter(s) (50 mL/Hr) IV Continuous <Continuous>  dextrose 5%. 1000 milliLiter(s) (100 mL/Hr) IV Continuous <Continuous>  dextrose 50% Injectable 25 Gram(s) IV Push once  dextrose 50% Injectable 12.5 Gram(s) IV Push once  dextrose 50% Injectable 25 Gram(s) IV Push once  fluticasone propionate/ salmeterol 500-50 MICROgram(s) Diskus 1 Dose(s) Inhalation two times a day  gabapentin 400 milliGRAM(s) Oral every 12 hours  glucagon  Injectable 1 milliGRAM(s) IntraMuscular once  insulin lispro (ADMELOG) corrective regimen sliding scale   SubCutaneous three times a day before meals  metoprolol tartrate 25 milliGRAM(s) Oral two times a day  pantoprazole  Injectable 40 milliGRAM(s) IV Push daily  piperacillin/tazobactam IVPB.. 3.375 Gram(s) IV Intermittent every 8 hours  sodium chloride 3%  Inhalation 4 milliLiter(s) Inhalation three times a day  torsemide 20 milliGRAM(s) Oral daily    MEDICATIONS  (PRN):  acetaminophen     Tablet .. 650 milliGRAM(s) Oral every 6 hours PRN Mild Pain (1 - 3)  dextrose Oral Gel 15 Gram(s) Oral once PRN Blood Glucose LESS THAN 70 milliGRAM(s)/deciliter    Allergies    No Known Allergies    Intolerances      Vital Signs Last 24 Hrs  T(C): 36.8 (27 Jun 2025 05:14), Max: 37.4 (26 Jun 2025 13:43)  T(F): 98.2 (27 Jun 2025 05:14), Max: 99.4 (26 Jun 2025 13:43)  HR: 71 (27 Jun 2025 08:09) (71 - 99)  BP: 119/65 (27 Jun 2025 05:14) (106/61 - 119/65)  BP(mean): --  RR: 19 (27 Jun 2025 05:14) (18 - 19)  SpO2: 96% (27 Jun 2025 08:09) (93% - 96%)    Parameters below as of 27 Jun 2025 08:09  Patient On (Oxygen Delivery Method): nasal cannula      I&O's Summary    26 Jun 2025 07:01  -  27 Jun 2025 07:00  --------------------------------------------------------  IN: 300 mL / OUT: 0 mL / NET: 300 mL    TELE: SR PVC, PAC 70-100s nonsustained PAT 130s  PHYSICAL EXAM:  Constitutional: NAD, awake and alert  HEENT: Moist Mucous Membranes, Anicteric  Pulmonary: Non-labored, breath sounds are clear bilaterally, Diminished at bases, No wheezing, rales or rhonchi  Cardiovascular: Regular, S1 and S2, No murmurs, No rubs, gallops or clicks  Gastrointestinal:  soft, nontender, nondistended   Lymph: No peripheral edema. No lymphadenopathy.   Skin: No visible rashes or ulcers.  Psych:  Mood & affect appropriate      LABS: All Labs Reviewed:                        8.6    6.70  )-----------( 178      ( 27 Jun 2025 08:20 )             28.2                         8.3    4.89  )-----------( 176      ( 26 Jun 2025 05:45 )             26.3                         8.5    5.15  )-----------( 186      ( 25 Jun 2025 05:55 )             26.0     26 Jun 2025 05:45    146    |  102    |  33     ----------------------------<  99     3.8     |  41     |  0.95   25 Jun 2025 05:55    146    |  99     |  32     ----------------------------<  94     3.2     |  41     |  0.95     Ca    8.9        26 Jun 2025 05:45  Ca    8.7        25 Jun 2025 05:55  Phos  2.4       25 Jun 2025 05:55  Mg     1.6       25 Jun 2025 05:55    TPro  5.8    /  Alb  2.7    /  TBili  0.5    /  DBili  x      /  AST  11     /  ALT  14     /  AlkPhos  56     26 Jun 2025 05:45  TPro  5.8    /  Alb  2.8    /  TBili  0.7    /  DBili  x      /  AST  5      /  ALT  15     /  AlkPhos  56     25 Jun 2025 05:55   LIVER FUNCTIONS - ( 26 Jun 2025 05:45 )  Alb: 2.7 g/dL / Pro: 5.8 g/dL / ALK PHOS: 56 U/L / ALT: 14 U/L / AST: 11 U/L / GGT: x           Troponin I, High Sensitivity Result: 26.1 ng/L (06-19-25 @ 06:55)    12 Lead ECG:   Ventricular Rate 88 BPM    Atrial Rate 88 BPM    P-R Interval 130 ms    QRS Duration 78 ms    Q-T Interval 370 ms    QTC Calculation(Bazett) 447 ms    P Axis 79 degrees    R Axis -48 degrees    T Axis 78 degrees    Diagnosis Line Sinus rhythm with premature atrial complexes  Left anterior fascicular block  Possible Lateral infarct , age undetermined (06-19-25 @ 04:43)      TRANSTHORACIC ECHOCARDIOGRAM REPORT  ________________________________________________________________________________                                      _______       Pt. Name:       MARTINEZ JONES Study Date:    6/12/2025  MRN:            [id number]        YOB: 1940  Accession #:    [id number]       Age:           84 years  Account#:       [id number]      Gender:        F  Heart Rate:                     Height:        62.99 in (160.00 cm)  Rhythm:                         Weight:        147.71 lb (67.00 kg)  Blood Pressure: 109/63 mmHg     BSA/BMI:       1.70 m² / 26.17 kg/m²  ________________________________________________________________________________________  Referring Physician:    [id number] Naresh Vitale  Interpreting Physician: Daniel Jorge M.D.  Primary Sonographer:    Jamal Hayes    CPT:               ECHO TTE WO CON COMP W DOPP - 80095.m  Indication(s):     Shock, unspecified - R57.9  Procedure:         Transthoracic echocardiogram with 2-D, M-mode and complete                     spectral and color flow Doppler.  Ordering Location: ICU1  Admission Status:  Inpatient  Study Information: Image quality for this study is technically difficult.    _______________________________________________________________________________________     CONCLUSIONS:      1. Technically difficult image quality.   2. Left ventricular systolic function is normal with an ejection fraction of 67 % by Arias's method of disks.   3. Mild left ventricular hypertrophy.   4.Normal right ventricular cavity size and probably normal right ventricular systolic function.   5. Tricuspid aortic valve with normal leaflet excursion. There is calcification of the aortic valve leaflets.   6. Moderate mitral valve leaflet calcification.   7. Mild mitral regurgitation.   8. Moderate tricuspid regurgitation.   9. The inferior vena cava is normal in size measuring 1.64 cm in diameter, (normal <2.1cm) with normal inspiratory collapse (normal >50%) consistent with normal right atrial pressure (~3, range 0-5mmHg).  10. Estimated pulmonary artery systolic pressure is 57 mmHg, consistent with moderate-to-severe pulmonary hypertension.  11. Trace pericardial effusion.  12. Right pleural effusion noted.  13. Compared to the transthoracic echocardiogram performed on 6/2/2025, there have been no significant interval changes.    ________________________________________________________________________________________  FINDINGS:     Left Ventricle:  Left ventricular systolic function is normal with a calculated ejection fraction of 67 % by the Arias's biplane method of disks. Mild left ventricular hypertrophy.     Right Ventricle:  The right ventricular cavity is normal in size and right ventricular systolic function is probablynormal. Tricuspid annular plane systolic excursion (TAPSE) is 1.9 cm (normal >=1.7 cm).     Left Atrium:  The left atrium is normal in size with an indexed volume of 29.06 ml/m².     Right Atrium:  The right atrium is normal in size with an indexed volume of 26.00 ml/m² and an indexed area of 9.41 cm²/m².     Interatrial Septum:  The interatrial septum appears intact.     Aortic Valve:  The aortic valve is tricuspid with normal leaflet excursion. There is calcification of the aortic valve leaflets. There is mild thickening of the aortic valve leaflets. There is trace aortic regurgitation.     Mitral Valve:  Structurally normal mitral valve with normal leaflet excursion. There is calcification of the mitral valve annulus. There is moderate leaflet calcification. There is mild mitral regurgitation.     Tricuspid Valve:  The tricuspid valve is structurally normal with normal leaflet excursion. There is moderate tricuspid regurgitation. Estimated pulmonary artery systolic pressure is 57 mmHg,consistent with moderate-to-severe pulmonary hypertension.     Pulmonic Valve:  The pulmonic valve was not well visualized. There is trace pulmonic regurgitation.     Aorta:  The aortic root at the sinuses of Valsalva is normal in size, measuring 3.30 cm (indexed 1.94 cm/m²). The ascending aorta is normal in size, measuring 3.00 cm (indexed 1.76 cm/m²). The aortic arch diameter is normal in size, measuring 2.4 cm (indexed 1.41 cm/m²).     Pericardium:  There is a trace pericardial effusion.     Pleura:  Right pleural effusion noted.     Systemic Veins:  The inferior vena cava is normal in size measuring 1.64 cm in diameter, (normal <2.1cm) with normal inspiratory collapse (normal >50%) consistent with normal right atrial pressure (~3, range 0-5mmHg).  ____________________________________________________________________  QUANTITATIVE DATA:  Left Ventricle Measurements: (Indexed to BSA)     IVSd (2D):   1.2 cm  LVPWd (2D):  1.1 cm  LVIDd (2D):  4.2 cm  LVIDs (2D):  2.4 cm  LV Mass:     169 g  99.5 g/m²  LV Vol d, MOD A2C: 38.9 ml 22.88 ml/m²  LV Vol d, MOD A4C: 40.9 ml 24.06 ml/m²  LV Vol d, MOD BP:  40.9 ml 24.04 ml/m²  LV Vol s, MOD A2C: 11.3 ml 6.65 ml/m²  LV Vol s, MOD A4C: 13.4 ml 7.88 ml/m²  LV Vol s, MOD BP:  13.4 ml 7.91 ml/m²  LVOT SV MOD BP:    27.4 ml  LV EF% MOD BP:     67 %     MV E Vmax:    1.54 m/s  MV A Vmax:    0.74 m/s  MV E/A:       2.08  e' lateral:   11.90 cm/s  e' medial:    8.81 cm/s  E/e' lateral: 12.94  E/e' medial:  17.48  E/e' Average: 14.87  MV DT:272 msec    Aorta Measurements: (Normal range) (Indexed to BSA)     Ao Root d     3.30 cm (2.7 - 3.3 cm) 1.94 cm/m²  Ao Asc d, 2D: 3.00  Ao Asc prox:  3.00 cm                1.76 cm/m²  Ao Arch:      2.4 cm            Left Atrium Measurements: (Indexed to BSA)  LA Diam 2D: 3.60 cm         Right Ventricle Measurements: Right Atrial Measurements:     TAPSE:            1.9 cm      RA Vol s, MOD A4C         44.2 ml  RV Base (RVID1):  2.9 cm      RA Vol s, MOD A4C i BSA   26.00 ml/m²  RV Mid (RVID2): 2.5 cm      RA Area s, MOD A4C        16.0 cm²  RV Major (RVID3): 6.4 cm      RA Area s, MOD A4C, i BSA 9.41 cm²/m²       LVOT / RVOT/ Qp/Qs Data: (Indexed to BSA)  LVOT Diameter,s: 2.20 cm  LVOT Area:       3.80 cm²    Mitral Valve Measurements:     MV E Vmax: 1.5 m/s         MR Vmax:          3.80 m/s  MV A Vmax: 0.7 m/s         MR Peak Gradient: 57.8 mmHg  MV E/A:    2.1       Tricuspid Valve Measurements:     TR Vmax:          3.7 m/s  TR Peak Gradient: 54.5 mmHg  RA Pressure:      3 mmHg  PASP:             57 mmHg    ________________________________________________________________________________________  Electronically signed on 6/13/2025 at 11:19:51 AM by Daniel Jorge M.D.         *** Final ***

## 2025-06-28 LAB
ALBUMIN SERPL ELPH-MCNC: 2.6 G/DL — LOW (ref 3.3–5)
ALP SERPL-CCNC: 63 U/L — SIGNIFICANT CHANGE UP (ref 40–120)
ALT FLD-CCNC: 18 U/L — SIGNIFICANT CHANGE UP (ref 12–78)
ANION GAP SERPL CALC-SCNC: 5 MMOL/L — SIGNIFICANT CHANGE UP (ref 5–17)
AST SERPL-CCNC: 20 U/L — SIGNIFICANT CHANGE UP (ref 15–37)
BASE EXCESS BLDA CALC-SCNC: 13.1 MMOL/L — HIGH (ref -2–3)
BASE EXCESS BLDA CALC-SCNC: 13.5 MMOL/L — HIGH (ref -2–3)
BASE EXCESS BLDA CALC-SCNC: 15.3 MMOL/L — HIGH (ref -2–3)
BASE EXCESS BLDA CALC-SCNC: 16.2 MMOL/L — HIGH (ref -2–3)
BILIRUB SERPL-MCNC: 0.4 MG/DL — SIGNIFICANT CHANGE UP (ref 0.2–1.2)
BLOOD GAS COMMENTS ARTERIAL: SIGNIFICANT CHANGE UP
BUN SERPL-MCNC: 38 MG/DL — HIGH (ref 7–23)
CALCIUM SERPL-MCNC: 8.4 MG/DL — LOW (ref 8.5–10.1)
CHLORIDE SERPL-SCNC: 101 MMOL/L — SIGNIFICANT CHANGE UP (ref 96–108)
CO2 SERPL-SCNC: 32 MMOL/L — HIGH (ref 22–31)
CREAT SERPL-MCNC: 1.4 MG/DL — HIGH (ref 0.5–1.3)
EGFR: 38 ML/MIN/1.73M2 — LOW
EGFR: 38 ML/MIN/1.73M2 — LOW
GAS PNL BLDA: SIGNIFICANT CHANGE UP
GLUCOSE SERPL-MCNC: 142 MG/DL — HIGH (ref 70–99)
HCO3 BLDA-SCNC: 40 MMOL/L — HIGH (ref 21–28)
HCO3 BLDA-SCNC: 41 MMOL/L — HIGH (ref 21–28)
HCT VFR BLD CALC: 29.3 % — LOW (ref 34.5–45)
HGB BLD-MCNC: 8.7 G/DL — LOW (ref 11.5–15.5)
HOROWITZ INDEX BLDA+IHG-RTO: 30 — SIGNIFICANT CHANGE UP
HOROWITZ INDEX BLDA+IHG-RTO: 30 — SIGNIFICANT CHANGE UP
HOROWITZ INDEX BLDA+IHG-RTO: 45 — SIGNIFICANT CHANGE UP
HOROWITZ INDEX BLDA+IHG-RTO: 50 — SIGNIFICANT CHANGE UP
IMMATURE PLATELET FRACTION #: 20.1 K/UL — HIGH (ref 4.7–11.1)
IMMATURE PLATELET FRACTION %: 21.2 % — HIGH (ref 1.6–4.9)
MAGNESIUM SERPL-MCNC: 1.7 MG/DL — SIGNIFICANT CHANGE UP (ref 1.6–2.6)
MCHC RBC-ENTMCNC: 29.7 G/DL — LOW (ref 32–36)
MCHC RBC-ENTMCNC: 30.3 PG — SIGNIFICANT CHANGE UP (ref 27–34)
MCV RBC AUTO: 102.1 FL — HIGH (ref 80–100)
NRBC # BLD AUTO: 0 K/UL — SIGNIFICANT CHANGE UP (ref 0–0)
NRBC # FLD: 0 K/UL — SIGNIFICANT CHANGE UP (ref 0–0)
NRBC BLD AUTO-RTO: 0 /100 WBCS — SIGNIFICANT CHANGE UP (ref 0–0)
PCO2 BLDA: 61 MMHG — HIGH (ref 32–35)
PCO2 BLDA: 74 MMHG — CRITICAL HIGH (ref 32–35)
PCO2 BLDA: 88 MMHG — CRITICAL HIGH (ref 32–35)
PCO2 BLDA: 92 MMHG — CRITICAL HIGH (ref 32–35)
PH BLDA: 7.25 — LOW (ref 7.35–7.45)
PH BLDA: 7.27 — LOW (ref 7.35–7.45)
PH BLDA: 7.35 — SIGNIFICANT CHANGE UP (ref 7.35–7.45)
PH BLDA: 7.43 — SIGNIFICANT CHANGE UP (ref 7.35–7.45)
PHOSPHATE SERPL-MCNC: 0.7 MG/DL — CRITICAL LOW (ref 2.5–4.5)
PLATELET # BLD AUTO: SIGNIFICANT CHANGE UP K/UL (ref 150–400)
PMV BLD: 13.9 FL — HIGH (ref 7–13)
PO2 BLDA: 116 MMHG — HIGH (ref 83–108)
PO2 BLDA: 62 MMHG — LOW (ref 83–108)
PO2 BLDA: 75 MMHG — LOW (ref 83–108)
PO2 BLDA: 82 MMHG — LOW (ref 83–108)
POTASSIUM SERPL-MCNC: 4.1 MMOL/L — SIGNIFICANT CHANGE UP (ref 3.5–5.3)
POTASSIUM SERPL-SCNC: 4.1 MMOL/L — SIGNIFICANT CHANGE UP (ref 3.5–5.3)
PROT SERPL-MCNC: 6.1 G/DL — SIGNIFICANT CHANGE UP (ref 6–8.3)
RBC # BLD: 2.87 M/UL — LOW (ref 3.8–5.2)
RBC # FLD: 17.5 % — HIGH (ref 10.3–14.5)
SAO2 % BLDA: 95.7 % — SIGNIFICANT CHANGE UP (ref 94–98)
SAO2 % BLDA: 98.4 % — HIGH (ref 94–98)
SAO2 % BLDA: 98.5 % — HIGH (ref 94–98)
SAO2 % BLDA: 99.4 % — HIGH (ref 94–98)
SODIUM SERPL-SCNC: 138 MMOL/L — SIGNIFICANT CHANGE UP (ref 135–145)
WBC # BLD: 9.71 K/UL — SIGNIFICANT CHANGE UP (ref 3.8–10.5)
WBC # FLD AUTO: 9.71 K/UL — SIGNIFICANT CHANGE UP (ref 3.8–10.5)

## 2025-06-28 PROCEDURE — 86923 COMPATIBILITY TEST ELECTRIC: CPT

## 2025-06-28 PROCEDURE — 0241U: CPT

## 2025-06-28 PROCEDURE — 87640 STAPH A DNA AMP PROBE: CPT

## 2025-06-28 PROCEDURE — 78582 LUNG VENTILAT&PERFUS IMAGING: CPT

## 2025-06-28 PROCEDURE — 87102 FUNGUS ISOLATION CULTURE: CPT

## 2025-06-28 PROCEDURE — 87205 SMEAR GRAM STAIN: CPT

## 2025-06-28 PROCEDURE — 88112 CYTOPATH CELL ENHANCE TECH: CPT

## 2025-06-28 PROCEDURE — 97110 THERAPEUTIC EXERCISES: CPT

## 2025-06-28 PROCEDURE — 87077 CULTURE AEROBIC IDENTIFY: CPT

## 2025-06-28 PROCEDURE — 93970 EXTREMITY STUDY: CPT

## 2025-06-28 PROCEDURE — 87040 BLOOD CULTURE FOR BACTERIA: CPT

## 2025-06-28 PROCEDURE — 82962 GLUCOSE BLOOD TEST: CPT

## 2025-06-28 PROCEDURE — 93005 ELECTROCARDIOGRAM TRACING: CPT

## 2025-06-28 PROCEDURE — 80053 COMPREHEN METABOLIC PANEL: CPT

## 2025-06-28 PROCEDURE — 84100 ASSAY OF PHOSPHORUS: CPT

## 2025-06-28 PROCEDURE — 92610 EVALUATE SWALLOWING FUNCTION: CPT

## 2025-06-28 PROCEDURE — 83516 IMMUNOASSAY NONANTIBODY: CPT

## 2025-06-28 PROCEDURE — 87252 VIRUS INOCULATION TISSUE: CPT

## 2025-06-28 PROCEDURE — 93010 ELECTROCARDIOGRAM REPORT: CPT

## 2025-06-28 PROCEDURE — 94660 CPAP INITIATION&MGMT: CPT

## 2025-06-28 PROCEDURE — 99291 CRITICAL CARE FIRST HOUR: CPT

## 2025-06-28 PROCEDURE — 99233 SBSQ HOSP IP/OBS HIGH 50: CPT

## 2025-06-28 PROCEDURE — 87449 NOS EACH ORGANISM AG IA: CPT

## 2025-06-28 PROCEDURE — 86480 TB TEST CELL IMMUN MEASURE: CPT

## 2025-06-28 PROCEDURE — 80048 BASIC METABOLIC PNL TOTAL CA: CPT

## 2025-06-28 PROCEDURE — 83036 HEMOGLOBIN GLYCOSYLATED A1C: CPT

## 2025-06-28 PROCEDURE — 71045 X-RAY EXAM CHEST 1 VIEW: CPT

## 2025-06-28 PROCEDURE — 87798 DETECT AGENT NOS DNA AMP: CPT

## 2025-06-28 PROCEDURE — 85018 HEMOGLOBIN: CPT

## 2025-06-28 PROCEDURE — 82803 BLOOD GASES ANY COMBINATION: CPT

## 2025-06-28 PROCEDURE — 87451 POLYVALENT MULT ORG EA AG IA: CPT

## 2025-06-28 PROCEDURE — A9567: CPT

## 2025-06-28 PROCEDURE — 85610 PROTHROMBIN TIME: CPT

## 2025-06-28 PROCEDURE — P9040: CPT

## 2025-06-28 PROCEDURE — 86901 BLOOD TYPING SEROLOGIC RH(D): CPT

## 2025-06-28 PROCEDURE — 97116 GAIT TRAINING THERAPY: CPT

## 2025-06-28 PROCEDURE — 86431 RHEUMATOID FACTOR QUANT: CPT

## 2025-06-28 PROCEDURE — 83735 ASSAY OF MAGNESIUM: CPT

## 2025-06-28 PROCEDURE — 86036 ANCA SCREEN EACH ANTIBODY: CPT

## 2025-06-28 PROCEDURE — 97162 PT EVAL MOD COMPLEX 30 MIN: CPT

## 2025-06-28 PROCEDURE — 97530 THERAPEUTIC ACTIVITIES: CPT

## 2025-06-28 PROCEDURE — 36430 TRANSFUSION BLD/BLD COMPNT: CPT

## 2025-06-28 PROCEDURE — 93306 TTE W/DOPPLER COMPLETE: CPT

## 2025-06-28 PROCEDURE — 94760 N-INVAS EAR/PLS OXIMETRY 1: CPT

## 2025-06-28 PROCEDURE — 87581 M.PNEUMON DNA AMP PROBE: CPT

## 2025-06-28 PROCEDURE — 82553 CREATINE MB FRACTION: CPT

## 2025-06-28 PROCEDURE — 85730 THROMBOPLASTIN TIME PARTIAL: CPT

## 2025-06-28 PROCEDURE — 36600 WITHDRAWAL OF ARTERIAL BLOOD: CPT

## 2025-06-28 PROCEDURE — 85025 COMPLETE CBC W/AUTO DIFF WBC: CPT

## 2025-06-28 PROCEDURE — 83880 ASSAY OF NATRIURETIC PEPTIDE: CPT

## 2025-06-28 PROCEDURE — 87070 CULTURE OTHR SPECIMN AEROBIC: CPT

## 2025-06-28 PROCEDURE — 83605 ASSAY OF LACTIC ACID: CPT

## 2025-06-28 PROCEDURE — 87899 AGENT NOS ASSAY W/OPTIC: CPT

## 2025-06-28 PROCEDURE — 86900 BLOOD TYPING SEROLOGIC ABO: CPT

## 2025-06-28 PROCEDURE — 85652 RBC SED RATE AUTOMATED: CPT

## 2025-06-28 PROCEDURE — 71045 X-RAY EXAM CHEST 1 VIEW: CPT | Mod: 26

## 2025-06-28 PROCEDURE — 85027 COMPLETE CBC AUTOMATED: CPT

## 2025-06-28 PROCEDURE — 87081 CULTURE SCREEN ONLY: CPT

## 2025-06-28 PROCEDURE — 84484 ASSAY OF TROPONIN QUANT: CPT

## 2025-06-28 PROCEDURE — 86225 DNA ANTIBODY NATIVE: CPT

## 2025-06-28 PROCEDURE — 36415 COLL VENOUS BLD VENIPUNCTURE: CPT

## 2025-06-28 PROCEDURE — 87641 MR-STAPH DNA AMP PROBE: CPT

## 2025-06-28 PROCEDURE — 87305 ASPERGILLUS AG IA: CPT

## 2025-06-28 PROCEDURE — 81001 URINALYSIS AUTO W/SCOPE: CPT

## 2025-06-28 PROCEDURE — 87116 MYCOBACTERIA CULTURE: CPT

## 2025-06-28 PROCEDURE — 87086 URINE CULTURE/COLONY COUNT: CPT

## 2025-06-28 PROCEDURE — A9540: CPT

## 2025-06-28 PROCEDURE — 94640 AIRWAY INHALATION TREATMENT: CPT

## 2025-06-28 PROCEDURE — 86146 BETA-2 GLYCOPROTEIN ANTIBODY: CPT

## 2025-06-28 PROCEDURE — 71250 CT THORAX DX C-: CPT

## 2025-06-28 PROCEDURE — 88305 TISSUE EXAM BY PATHOLOGIST: CPT

## 2025-06-28 PROCEDURE — 85014 HEMATOCRIT: CPT

## 2025-06-28 PROCEDURE — 86850 RBC ANTIBODY SCREEN: CPT

## 2025-06-28 PROCEDURE — 82550 ASSAY OF CK (CPK): CPT

## 2025-06-28 PROCEDURE — 86038 ANTINUCLEAR ANTIBODIES: CPT

## 2025-06-28 PROCEDURE — 86147 CARDIOLIPIN ANTIBODY EA IG: CPT

## 2025-06-28 PROCEDURE — 84145 PROCALCITONIN (PCT): CPT

## 2025-06-28 PROCEDURE — 87594 PNEUMCYSTS JIROVECII AMP PRB: CPT

## 2025-06-28 PROCEDURE — 86140 C-REACTIVE PROTEIN: CPT

## 2025-06-28 RX ORDER — HYDROCORTISONE 20 MG
50 TABLET ORAL EVERY 6 HOURS
Refills: 0 | Status: DISCONTINUED | OUTPATIENT
Start: 2025-06-28 | End: 2025-06-29

## 2025-06-28 RX ORDER — FUROSEMIDE 10 MG/ML
40 INJECTION INTRAMUSCULAR; INTRAVENOUS ONCE
Refills: 0 | Status: COMPLETED | OUTPATIENT
Start: 2025-06-28 | End: 2025-06-28

## 2025-06-28 RX ORDER — MIDODRINE HYDROCHLORIDE 5 MG/1
5 TABLET ORAL ONCE
Refills: 0 | Status: DISCONTINUED | OUTPATIENT
Start: 2025-06-28 | End: 2025-06-28

## 2025-06-28 RX ORDER — SODIUM PHOSPHATE,DIBASIC DIHYD
30 POWDER (GRAM) MISCELLANEOUS ONCE
Refills: 0 | Status: COMPLETED | OUTPATIENT
Start: 2025-06-28 | End: 2025-06-28

## 2025-06-28 RX ORDER — HYDROCORTISONE 20 MG
50 TABLET ORAL EVERY 6 HOURS
Refills: 0 | Status: DISCONTINUED | OUTPATIENT
Start: 2025-06-28 | End: 2025-06-28

## 2025-06-28 RX ORDER — HYDROCORTISONE 20 MG
25 TABLET ORAL ONCE
Refills: 0 | Status: COMPLETED | OUTPATIENT
Start: 2025-06-28 | End: 2025-06-28

## 2025-06-28 RX ORDER — MIDODRINE HYDROCHLORIDE 5 MG/1
10 TABLET ORAL ONCE
Refills: 0 | Status: DISCONTINUED | OUTPATIENT
Start: 2025-06-28 | End: 2025-06-28

## 2025-06-28 RX ORDER — FUROSEMIDE 10 MG/ML
40 INJECTION INTRAMUSCULAR; INTRAVENOUS DAILY
Refills: 0 | Status: DISCONTINUED | OUTPATIENT
Start: 2025-06-28 | End: 2025-07-03

## 2025-06-28 RX ORDER — NOREPINEPHRINE BITARTRATE 8 MG
0.05 SOLUTION INTRAVENOUS
Qty: 8 | Refills: 0 | Status: DISCONTINUED | OUTPATIENT
Start: 2025-06-28 | End: 2025-06-29

## 2025-06-28 RX ADMIN — FUROSEMIDE 40 MILLIGRAM(S): 10 INJECTION INTRAMUSCULAR; INTRAVENOUS at 10:15

## 2025-06-28 RX ADMIN — INSULIN LISPRO 1: 100 INJECTION, SOLUTION INTRAVENOUS; SUBCUTANEOUS at 17:02

## 2025-06-28 RX ADMIN — Medication 50 MILLIGRAM(S): at 11:10

## 2025-06-28 RX ADMIN — Medication 25 GRAM(S): at 13:39

## 2025-06-28 RX ADMIN — Medication 5 MILLIGRAM(S): at 21:31

## 2025-06-28 RX ADMIN — Medication 25 MILLIGRAM(S): at 07:14

## 2025-06-28 RX ADMIN — Medication 50 MILLIGRAM(S): at 23:22

## 2025-06-28 RX ADMIN — IPRATROPIUM BROMIDE AND ALBUTEROL SULFATE 3 MILLILITER(S): .5; 2.5 SOLUTION RESPIRATORY (INHALATION) at 20:31

## 2025-06-28 RX ADMIN — Medication 25 GRAM(S): at 21:31

## 2025-06-28 RX ADMIN — Medication 4 MILLILITER(S): at 07:35

## 2025-06-28 RX ADMIN — Medication 1 APPLICATION(S): at 07:38

## 2025-06-28 RX ADMIN — Medication 250 MILLILITER(S): at 08:45

## 2025-06-28 RX ADMIN — Medication 1 DOSE(S): at 08:00

## 2025-06-28 RX ADMIN — FUROSEMIDE 40 MILLIGRAM(S): 10 INJECTION INTRAMUSCULAR; INTRAVENOUS at 10:01

## 2025-06-28 RX ADMIN — AMIODARONE HYDROCHLORIDE 100 MILLIGRAM(S): 50 INJECTION, SOLUTION INTRAVENOUS at 06:52

## 2025-06-28 RX ADMIN — Medication 85 MILLIMOLE(S): at 12:25

## 2025-06-28 RX ADMIN — Medication 500 MILLILITER(S): at 07:37

## 2025-06-28 RX ADMIN — Medication 40 MILLIGRAM(S): at 11:10

## 2025-06-28 RX ADMIN — Medication 4 MILLILITER(S): at 20:31

## 2025-06-28 RX ADMIN — IPRATROPIUM BROMIDE AND ALBUTEROL SULFATE 3 MILLILITER(S): .5; 2.5 SOLUTION RESPIRATORY (INHALATION) at 14:24

## 2025-06-28 RX ADMIN — IPRATROPIUM BROMIDE AND ALBUTEROL SULFATE 3 MILLILITER(S): .5; 2.5 SOLUTION RESPIRATORY (INHALATION) at 07:35

## 2025-06-28 RX ADMIN — Medication 4 MILLILITER(S): at 14:25

## 2025-06-28 RX ADMIN — Medication 25 GRAM(S): at 06:57

## 2025-06-28 RX ADMIN — NOREPINEPHRINE BITARTRATE 6.74 MICROGRAM(S)/KG/MIN: 8 SOLUTION at 09:41

## 2025-06-28 RX ADMIN — Medication 50 MILLIGRAM(S): at 17:01

## 2025-06-28 NOTE — CONSULT NOTE ADULT - SUBJECTIVE AND OBJECTIVE BOX
********MICU ACCEPT NOTE********    HPI:  84 yr old female with PMHx afib on eliquis, COPD (not on home O2), PE/Rt lower extremity DVT 2017, Rt LE IVC filter 2017 CKD3, aplastic anemia, polymyalgia rheumatica on prednisone 5 mg po qd, requiring PRBC transfusions ~8 weeks (via Rt subclavian mediport), AVN bilat hips, HTN, HLD, HFpEF (75% 6.2.25), diastolic dysfx grade1, recent hospitalization 5/25/25-6/5/25 for AHDF/COPD exacerbation, pt also with hx of IVC filter, had Rt subclavian mediport. Pt with eventual improvement and transfer to [facility] facility from where she presents to E.D. today with c/o Rt sided chest pain. general malaise. Pt stated began to feel ill evening before presentation, daughter this a.m. endorsed pt lethargic, pale.     ED course  Vitals: 99.9 T , HR 71 , BP 82/48, RR 16 , SpO2 99% on 3L  Significant labs: WBC 32.33 Hgb 7.5 BUN 72 Cr 1.9 Mag 1.4 procal 13.61 pro-bnp 6599  RVP neg  Imaging: CXR: Dense infiltrate off the right upper hilum is presently seen  EKG: Sinus rhythm 80 bpm with premature supraventricular complexes Left axis deviation Possible Lateral infarct, age undetermined  In ED patient given: azithromycin x1, zosyn x1, vanco x1, IVF NS 500mL bolus x1, ofirmev x1, midodrine 10mg x1, 1U PRBC       (08 Jun 2025 15:50)      Events:  this am bp 70/50  lethargic and awake with voice  refused avap ovn      Allergies: No Known Allergies    PAST MEDICAL & SURGICAL HISTORY:  COPD (chronic obstructive pulmonary disease)      DM (diabetes mellitus)  diet-controlled      PAF (paroxysmal atrial fibrillation)  s/p ablation      Hiatal hernia      H/O aplastic anemia      History of IBS      H/O osteoporosis      HLD (hyperlipidemia)      PMR (polymyalgia rheumatica)      History of basal cell cancer      Chronic kidney disease (CKD)      Hypotension      Presence of IVC filter      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      History of immunodeficiency      Lumbar compression fracture      H/O hiatal hernia      H/O pulmonary fibrosis      H/O sinus bradycardia      History of fracture of right hip  "unoperable"      S/P hysterectomy      S/P foot surgery      S/P knee surgery      After cataract  bilateral eye cataract surgically removed      Closed right hip fracture  rigth hip ORIF july 19 2016      S/P IVC filter  nov 2016      S/P carpal tunnel release  Right 2008 & 6/27/17      H/O kyphoplasty      Port-A-Cath in place  right chest        FAMILY HISTORY:  Family history of bladder cancer (Mother)    Family history of myocardial infarction (Father)    FH: atrial fibrillation (Father)      SOCIAL HISTORY:    Home Medications:    Review of Systems:  Pertinent positives noted above, all other ROS negative x10 system    T(F): 94.9 (06-28-25 @ 08:15), Max: 98.5 (06-27-25 @ 12:32)  HR: 59 (06-28-25 @ 09:15) (59 - 88)  BP: 70/50 (06-28-25 @ 08:15) (70/50 - 114/59)  RR: 18 (06-28-25 @ 08:15) (18 - 19)  SpO2: 96% (06-28-25 @ 09:15)  Wt(kg): --    CAPILLARY BLOOD GLUCOSE      POCT Blood Glucose.: 107 mg/dL (28 Jun 2025 08:28)    I&O's Summary    27 Jun 2025 07:01  -  28 Jun 2025 07:00  --------------------------------------------------------  IN: 150 mL / OUT: 340 mL / NET: -190 mL        Physical Exam:   general: lethargic responds to stimuli    HEENT: Pupils equal, reactive to light.  Symmetric.    PULM: Clear to auscultation bilaterally, no significant sputum production    NECK: Supple, no lymphadenopathy, trachea midline    CVS: Regular rate and rhythm, no murmurs, rubs, or gallops    ABD: Soft, nondistended, nontender, normoactive bowel sounds, no masses    EXT: No edema, nontender    SKIN: Warm and well perfused, no rashes noted.    NEURO: alert to stimuli      Meds:  piperacillin/tazobactam IVPB.. 3.375 Gram(s) IV Intermittent every 8 hours    aMIOdarone    Tablet 100 milliGRAM(s) Oral daily     dextrose 50% Injectable 12.5 Gram(s) IV Push once  dextrose 50% Injectable 25 Gram(s) IV Push once  dextrose 50% Injectable 25 Gram(s) IV Push once  dextrose Oral Gel 15 Gram(s) Oral once PRN  glucagon  Injectable 1 milliGRAM(s) IntraMuscular once  insulin lispro (ADMELOG) corrective regimen sliding scale   SubCutaneous three times a day before meals     albuterol/ipratropium for Nebulization 3 milliLiter(s) Nebulizer three times a day  fluticasone propionate/ salmeterol 500-50 MICROgram(s) Diskus 1 Dose(s) Inhalation two times a day  sodium chloride 3%  Inhalation 4 milliLiter(s) Inhalation three times a day     acetaminophen     Tablet .. 650 milliGRAM(s) Oral every 6 hours PRN  gabapentin 400 milliGRAM(s) Oral every 12 hours  melatonin 5 milliGRAM(s) Oral at bedtime           pantoprazole  Injectable 40 milliGRAM(s) IV Push daily        dextrose 5%. 1000 milliLiter(s) IV Continuous <Continuous>  dextrose 5%. 1000 milliLiter(s) IV Continuous <Continuous>        chlorhexidine 2% Cloths 1 Application(s) Topical <User Schedule>                              8.7    9.71  )-----------( Clumped    ( 28 Jun 2025 06:20 )             29.3       06-28    138  |  101  |  38[H]  ----------------------------<  142[H]  4.1   |  32[H]  |  1.40[H]    Ca    8.4[L]      28 Jun 2025 06:20  Mg     1.7     06-27    TPro  6.1  /  Alb  2.6[L]  /  TBili  0.4  /  DBili  x   /  AST  20  /  ALT  18  /  AlkPhos  63  06-28            Urinalysis Basic - ( 28 Jun 2025 06:20 )    Color: x / Appearance: x / SG: x / pH: x  Gluc: 142 mg/dL / Ketone: x  / Bili: x / Urobili: x   Blood: x / Protein: x / Nitrite: x   Leuk Esterase: x / RBC: x / WBC x   Sq Epi: x / Non Sq Epi: x / Bacteria: x            ABG - ( 28 Jun 2025 08:38 )  pH, Arterial: 7.25  pH, Blood: x     /  pCO2: 92    /  pO2: 75    / HCO3: 40    / Base Excess: 13.1  /  SaO2: 98.4                        Tubes/Lines:  peripheral lines       GLOBAL ISSUE/BEST PRACTICE:  Analgesia: N   Sedation: N   HOB elevation: Y  Stress ulcer prophylaxis:   VTE prophylaxis: SCDs   Glycemic control: low dose ISS   Nutrition: NPO     CODE STATUS:  FULL CODE

## 2025-06-28 NOTE — PROGRESS NOTE ADULT - SUBJECTIVE AND OBJECTIVE BOX
CHIEF COMPLAINT/ REASON FOR VISIT  .. Patient was seen to address the  issue listed under PROBLEM LIST which is located toward bottom of this note     MARTINEZ PATEL 1EAS 106 W1    Allergies    No Known Allergies    Intolerances        PAST MEDICAL & SURGICAL HISTORY:  COPD (chronic obstructive pulmonary disease)      DM (diabetes mellitus)  diet-controlled      PAF (paroxysmal atrial fibrillation)  s/p ablation      Hiatal hernia      H/O aplastic anemia      History of IBS      H/O osteoporosis      HLD (hyperlipidemia)      PMR (polymyalgia rheumatica)      History of basal cell cancer      Chronic kidney disease (CKD)      Hypotension      Presence of IVC filter      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      History of immunodeficiency      Lumbar compression fracture      H/O hiatal hernia      H/O pulmonary fibrosis      H/O sinus bradycardia      History of fracture of right hip  "unoperable"      S/P hysterectomy      S/P foot surgery      S/P knee surgery      After cataract  bilateral eye cataract surgically removed      Closed right hip fracture  rigth hip ORIF july 19 2016      S/P IVC filter  nov 2016      S/P carpal tunnel release  Right 2008 & 6/27/17      H/O kyphoplasty      Port-A-Cath in place  right chest          FAMILY HISTORY:  Family history of bladder cancer (Mother)    Family history of myocardial infarction (Father)    FH: atrial fibrillation (Father)        Home Medications:  amiodarone 200 mg oral tablet: 0.5 tab(s) orally once a day (08 Jun 2025 16:43)  Bentyl 10 mg oral capsule: 1 cap(s) orally 2 times a day, As Needed (08 Jun 2025 16:43)  Eliquis 2.5 mg oral tablet: 1 tab(s) orally 2 times a day (08 Jun 2025 16:43)  fluticasone-salmeterol 500 mcg-50 mcg/inh inhalation powder: 1 puff(s) inhaled 2 times a day (08 Jun 2025 16:43)  folic acid 1 mg oral tablet: 1 tab(s) orally once a day (in the morning) (08 Jun 2025 16:43)  gabapentin 400 mg oral capsule: 1 cap(s) orally 2 times a day (08 Jun 2025 16:43)  ipratropium-albuterol 0.5 mg-2.5 mg/3 mL inhalation solution: 3 milliliter(s) inhaled every 6 hours As needed Shortness of Breath and/or Wheezing (08 Jun 2025 16:43)  IVIG monthly:  (08 Jun 2025 16:43)  leflunomide 10 mg oral tablet: 1 tab(s) orally once a day (08 Jun 2025 16:43)  midodrine 10 mg oral tablet: 1 tab(s) orally every 8 hours (08 Jun 2025 16:43)  montelukast 10 mg oral tablet: 1 tab(s) orally once a day (08 Jun 2025 16:43)  pantoprazole 40 mg oral delayed release tablet: 1 tab(s) orally once a day (before a meal) (08 Jun 2025 16:43)  predniSONE 5 mg oral tablet: 1 tab(s) orally once a day (08 Jun 2025 16:43)  Procrit 10,000 units/mL preservative-free injectable solution: injectable once a week WEDNESDAY (08 Jun 2025 16:43)  Reclast Infusion , IV x 1 yearly:  (08 Jun 2025 16:43)  simvastatin 10 mg oral tablet: 1 tab(s) orally once a day (at bedtime) (08 Jun 2025 16:43)  tiotropium 2.5 mcg/inh inhalation aerosol: 2 puff(s) inhaled once a day (08 Jun 2025 16:43)  tiZANidine: 2 tab(s) orally once a day (at bedtime) as needed for  muscle spasm (08 Jun 2025 16:43)  torsemide 20 mg oral tablet: 1 tab(s) orally once a day (in the morning) (08 Jun 2025 16:49)      MEDICATIONS  (STANDING):  albuterol/ipratropium for Nebulization 3 milliLiter(s) Nebulizer three times a day  aMIOdarone    Tablet 100 milliGRAM(s) Oral daily  chlorhexidine 2% Cloths 1 Application(s) Topical <User Schedule>  dextrose 5%. 1000 milliLiter(s) (50 mL/Hr) IV Continuous <Continuous>  dextrose 5%. 1000 milliLiter(s) (100 mL/Hr) IV Continuous <Continuous>  dextrose 50% Injectable 12.5 Gram(s) IV Push once  dextrose 50% Injectable 25 Gram(s) IV Push once  dextrose 50% Injectable 25 Gram(s) IV Push once  fluticasone propionate/ salmeterol 500-50 MICROgram(s) Diskus 1 Dose(s) Inhalation two times a day  gabapentin 400 milliGRAM(s) Oral every 12 hours  glucagon  Injectable 1 milliGRAM(s) IntraMuscular once  insulin lispro (ADMELOG) corrective regimen sliding scale   SubCutaneous three times a day before meals  melatonin 5 milliGRAM(s) Oral at bedtime  metoprolol tartrate 25 milliGRAM(s) Oral two times a day  pantoprazole  Injectable 40 milliGRAM(s) IV Push daily  piperacillin/tazobactam IVPB.. 3.375 Gram(s) IV Intermittent every 8 hours  predniSONE   Tablet 10 milliGRAM(s) Oral daily  sodium chloride 3%  Inhalation 4 milliLiter(s) Inhalation three times a day  torsemide 20 milliGRAM(s) Oral daily    MEDICATIONS  (PRN):  acetaminophen     Tablet .. 650 milliGRAM(s) Oral every 6 hours PRN Mild Pain (1 - 3)  dextrose Oral Gel 15 Gram(s) Oral once PRN Blood Glucose LESS THAN 70 milliGRAM(s)/deciliter      Diet, Regular:   DASH/TLC Sodium & Cholesterol Restricted (06-23-25 @ 13:52) [Active]          Vital Signs Last 24 Hrs  T(C): 34.9 (28 Jun 2025 06:36), Max: 36.9 (27 Jun 2025 12:32)  T(F): 94.9 (28 Jun 2025 06:36), Max: 98.5 (27 Jun 2025 12:32)  HR: 65 (28 Jun 2025 06:36) (65 - 88)  BP: 94/55 (28 Jun 2025 06:36) (84/35 - 114/59)  BP(mean): --  RR: 18 (28 Jun 2025 06:36) (18 - 19)  SpO2: 99% (28 Jun 2025 06:36) (94% - 99%)    Parameters below as of 28 Jun 2025 06:36  Patient On (Oxygen Delivery Method): nasal cannula  O2 Flow (L/min): 4        06-27-25 @ 07:01  -  06-28-25 @ 07:00  --------------------------------------------------------  IN: 150 mL / OUT: 340 mL / NET: -190 mL              LABS:                        8.7    9.71  )-----------( x        ( 28 Jun 2025 06:20 )             29.3     06-28    138  |  101  |  38[H]  ----------------------------<  142[H]  4.1   |  32[H]  |  1.40[H]    Ca    8.4[L]      28 Jun 2025 06:20  Mg     1.7     06-27    TPro  6.1  /  Alb  2.6[L]  /  TBili  0.4  /  DBili  x   /  AST  20  /  ALT  18  /  AlkPhos  63  06-28      Urinalysis Basic - ( 28 Jun 2025 06:20 )    Color: x / Appearance: x / SG: x / pH: x  Gluc: 142 mg/dL / Ketone: x  / Bili: x / Urobili: x   Blood: x / Protein: x / Nitrite: x   Leuk Esterase: x / RBC: x / WBC x   Sq Epi: x / Non Sq Epi: x / Bacteria: x            WBC:  WBC Count: 9.71 K/uL (06-28 @ 06:20)  WBC Count: 6.70 K/uL (06-27 @ 08:20)  WBC Count: 4.89 K/uL (06-26 @ 05:45)  WBC Count: 5.15 K/uL (06-25 @ 05:55)      MICROBIOLOGY:  RECENT CULTURES:  06-22 Sputum Sputum XXXX   Few polymorphonuclear leukocytes per low power field  Few Squamous epithelial cells per low power field  No organisms seen per oil power field   Commensal marilee consistent with body site                    Sodium:  Sodium: 138 mmol/L (06-28 @ 06:20)  Sodium: 143 mmol/L (06-27 @ 08:20)  Sodium: 146 mmol/L (06-26 @ 05:45)  Sodium: 146 mmol/L (06-25 @ 05:55)      1.40 mg/dL 06-28 @ 06:20  1.20 mg/dL 06-27 @ 08:20  0.95 mg/dL 06-26 @ 05:45  0.95 mg/dL 06-25 @ 05:55      Hemoglobin:  Hemoglobin: 8.7 g/dL (06-28 @ 06:20)  Hemoglobin: 8.6 g/dL (06-27 @ 08:20)  Hemoglobin: 8.3 g/dL (06-26 @ 05:45)  Hemoglobin: 8.5 g/dL (06-25 @ 05:55)      Platelets: Platelet Count - Automated: 178 K/uL (06-27 @ 08:20)  Platelet Count - Automated: 176 K/uL (06-26 @ 05:45)  Platelet Count - Automated: 186 K/uL (06-25 @ 05:55)      LIVER FUNCTIONS - ( 28 Jun 2025 06:20 )  Alb: 2.6 g/dL / Pro: 6.1 g/dL / ALK PHOS: 63 U/L / ALT: 18 U/L / AST: 20 U/L / GGT: x             Urinalysis Basic - ( 28 Jun 2025 06:20 )    Color: x / Appearance: x / SG: x / pH: x  Gluc: 142 mg/dL / Ketone: x  / Bili: x / Urobili: x   Blood: x / Protein: x / Nitrite: x   Leuk Esterase: x / RBC: x / WBC x   Sq Epi: x / Non Sq Epi: x / Bacteria: x        RADIOLOGY & ADDITIONAL STUDIES:      MICROBIOLOGY:  RECENT CULTURES:  06-22 Sputum Sputum XXXX   Few polymorphonuclear leukocytes per low power field  Few Squamous epithelial cells per low power field  No organisms seen per oil power field   Commensal marilee consistent with body site

## 2025-06-28 NOTE — PROGRESS NOTE ADULT - SUBJECTIVE AND OBJECTIVE BOX
[facility]    Sai Valle, Girish Jackson, El, [place]      [phone]    CHIEF COMPLAINT: Patient is a 84y old  Female who presents with a chief complaint of septic shock, PNA (28 Jun 2025 09:22)      Follow Up: resp failure, hypotension, hypothermia    Interim history: Unable to provide a history on the basis of a poor mental status.  Events noted. rrt for hypotension an dhypercarbic resp failure    MEDICATIONS  (STANDING):  albuterol/ipratropium for Nebulization 3 milliLiter(s) Nebulizer three times a day  aMIOdarone    Tablet 100 milliGRAM(s) Oral daily  chlorhexidine 2% Cloths 1 Application(s) Topical <User Schedule>  dextrose 5%. 1000 milliLiter(s) (50 mL/Hr) IV Continuous <Continuous>  dextrose 5%. 1000 milliLiter(s) (100 mL/Hr) IV Continuous <Continuous>  dextrose 50% Injectable 12.5 Gram(s) IV Push once  dextrose 50% Injectable 25 Gram(s) IV Push once  dextrose 50% Injectable 25 Gram(s) IV Push once  fluticasone propionate/ salmeterol 500-50 MICROgram(s) Diskus 1 Dose(s) Inhalation two times a day  furosemide   Injectable 40 milliGRAM(s) IV Push daily  gabapentin 400 milliGRAM(s) Oral every 12 hours  glucagon  Injectable 1 milliGRAM(s) IntraMuscular once  hydrocortisone sodium succinate Injectable 50 milliGRAM(s) IV Push every 6 hours  insulin lispro (ADMELOG) corrective regimen sliding scale   SubCutaneous three times a day before meals  melatonin 5 milliGRAM(s) Oral at bedtime  norepinephrine Infusion 0.05 MICROgram(s)/kG/Min (6.74 mL/Hr) IV Continuous <Continuous>  pantoprazole  Injectable 40 milliGRAM(s) IV Push daily  piperacillin/tazobactam IVPB.. 3.375 Gram(s) IV Intermittent every 8 hours  sodium chloride 3%  Inhalation 4 milliLiter(s) Inhalation three times a day  sodium phosphate 30 milliMole(s)/500 mL IVPB 30 milliMole(s) IV Intermittent once    MEDICATIONS  (PRN):  acetaminophen     Tablet .. 650 milliGRAM(s) Oral every 6 hours PRN Mild Pain (1 - 3)  dextrose Oral Gel 15 Gram(s) Oral once PRN Blood Glucose LESS THAN 70 milliGRAM(s)/deciliter      REVIEW OF SYSTEMS: unable to provide  Vital Signs Last 24 Hrs  T(C): 34.9 (28 Jun 2025 08:15), Max: 36.9 (27 Jun 2025 12:32)  T(F): 94.9 (28 Jun 2025 08:15), Max: 98.5 (27 Jun 2025 12:32)  HR: 58 (28 Jun 2025 10:30) (54 - 88)  BP: 112/45 (28 Jun 2025 10:30) (70/50 - 141/58)  BP(mean): 65 (28 Jun 2025 10:30) (50 - 79)  RR: 14 (28 Jun 2025 10:30) (12 - 20)  SpO2: 100% (28 Jun 2025 10:30) (94% - 100%)    Parameters below as of 28 Jun 2025 09:35  Patient On (Oxygen Delivery Method): BiPAP/CPAP        I&O's Summary    27 Jun 2025 07:01  -  28 Jun 2025 07:00  --------------------------------------------------------  IN: 150 mL / OUT: 340 mL / NET: -190 mL    28 Jun 2025 07:01  -  28 Jun 2025 11:07  --------------------------------------------------------  IN: 4.1 mL / OUT: 0 mL / NET: 4.1 mL        Telemetry past 24h: af    PHYSICAL EXAM:    Constitutional: well-nourished, well-developed, NAD   HEENT:  MMM, sclerae anicteric, conjunctivae clear, no oral cyanosis.  Pulmonary: Non-labored, rhonchi vaughn  Cardiovascular: irregular, S1 and S2.  No murmur.  No rubs, gallops or clicks  Gastrointestinal: Bowel Sounds present, soft, nontender.   Lymph: No peripheral edema.   Neurological: unable to evaluate, poor mental status  Skin: No rashes.  Psych:  Mood & affect not evaluable    LABS: All Labs Reviewed:                        8.7    9.71  )-----------( Clumped    ( 28 Jun 2025 06:20 )             29.3                         8.6    6.70  )-----------( 178      ( 27 Jun 2025 08:20 )             28.2                         8.3    4.89  )-----------( 176      ( 26 Jun 2025 05:45 )             26.3     28 Jun 2025 06:20    138    |  101    |  38     ----------------------------<  142    4.1     |  32     |  1.40   27 Jun 2025 08:20    143    |  98     |  30     ----------------------------<  109    3.7     |  40     |  1.20   26 Jun 2025 05:45    146    |  102    |  33     ----------------------------<  99     3.8     |  41     |  0.95     Ca    8.4        28 Jun 2025 06:20  Ca    8.5        27 Jun 2025 08:20  Ca    8.9        26 Jun 2025 05:45  Phos  0.7       28 Jun 2025 06:20  Mg     1.7       28 Jun 2025 06:20  Mg     1.7       27 Jun 2025 08:20    TPro  6.1    /  Alb  2.6    /  TBili  0.4    /  DBili  x      /  AST  20     /  ALT  18     /  AlkPhos  63     28 Jun 2025 06:20  TPro  5.8    /  Alb  2.8    /  TBili  0.5    /  DBili  x      /  AST  21     /  ALT  18     /  AlkPhos  63     27 Jun 2025 08:20  TPro  5.8    /  Alb  2.7    /  TBili  0.5    /  DBili  x      /  AST  11     /  ALT  14     /  AlkPhos  56     26 Jun 2025 05:45          Blood Culture:         RADIOLOGY:    EKG:    Echo:

## 2025-06-28 NOTE — PROVIDER CONTACT NOTE (CRITICAL VALUE NOTIFICATION) - TEST AND RESULT REPORTED:
PH7.27, PCO2 92, PO2 57, HCO3 42.2
PH7.31, PC02 82, PO2 153, HCO3 41.3
ABG
abg pH 7.42 paco2 70 mmHg bicarb 45
critical abg results values
Phos 0.7
ABG
ABG

## 2025-06-28 NOTE — PROGRESS NOTE ADULT - ASSESSMENT
84F with HTN, HLD, COPD, HFpEF, paroxysmal afib on eliquis, DVT/PE s/p IVC filter, COPD, CKD, aplastic anemia requiring pRBC transfusion s/p chest wall port, polymyalgia rheumatica on steroids, AVN of hips, initially admitted to the ICU 06/08 with septic shock 2/2 RML PNA requiring levophed for hemodynamic support. Since transferred to the floor. Now with recurrent RRTs for acute on chronic hypercapnic respiratory failure with AMS due to NIPPV noncompliance.     Acute on chronic hypercapnic respiratory failure  Metabolic encephalopathy  Hypotension 2/2 adrenal insufficiency     Neuro: Metabolic encephalopathy in the setting of acute hypercapnic respiratory failure. Obtunded initially. Now AOx2 with improvement in ABG. Able to conversate with family. Continue to monitor closely.     CV: On low-dose levophed. Hypotensive 2/2 adrenal insufficiency. On stress dose steroids. Wean vasopressor as tolerated. Maintain MAP>65. History of afib, continue with PO amio.     Pulm: Acute hypercapnic respiratory failure 2/2 noncompliance to NIPPV while on the floor. Remains on AVAPS. Improved ABG 7.43/61/82/40. Will repeat in the AM. C/w hypersal nebs and bronchodilators.     GI: NPO while on AVAPS. LFTs wnl.     Renal: Creatinine slightly increased 1.2 to 1.4. Trend. Monitor I's and O's. Replete electrolytes prn.     ID: Zosyn for PNA. Last dose today. Monitor WBC ad temps.     Heme: AC held given recent hemoptysis. Trend CBCs. SCDs for now.     Endo: On stress dose steroids. ISS. FS. Maintain euglycemia.       CRITICAL CARE TIME SPENT:   35 minutes spent evaluating/treating patient with medical issues that pose a high risk for life threatening deterioration and/or end-organ damage, reviewing data/labs/imaging, discussing case with multidisciplinary team, discussing plan/goals of care with patient/family. Non-inclusive of procedure time. The date of entry of this note reflects the date of services rendered.

## 2025-06-28 NOTE — PROVIDER CONTACT NOTE (OTHER) - ASSESSMENT
bp 95/55 99% 65HR 94.9F b/s 155
VSS. Pt c/o intense right side pain. Pt states the lidocaine patch didn't help. Pt is requesting tylenol for her pain.
upon assessment patient found resting in bed, aox4, NAD, denies chest pain, dizziness. c/o some SOB O2 sat 95% 2LNC. IV Levophed gtt infusing. blood tinged sputum present in napkin.

## 2025-06-28 NOTE — PROVIDER CONTACT NOTE (CRITICAL VALUE NOTIFICATION) - SITUATION
Critical abg results read back and reported back to Dr holman
ph 7.25  paco2 92  hco3 40
ph 7.35  paco2 74  hco3 40.9
ph 7.27  paco2 88  hco3 40

## 2025-06-28 NOTE — PROGRESS NOTE ADULT - ASSESSMENT
84 female PMH of A-fib on Eliquis, COPD, CKD, aplastic anemia, polymyalgia rheumatica, on chronic steroids, avascular necrosis of hips, HLD, HTN, DM, recent admission to [facility] 5/26 through 6/5/2025 for CHF/fluid overload/COPD exacerbation, presents to the ED form [facility] for evaluation of fever and generalized feeling of being unwell, found to be septic and hypotensive.       baseline o2-dep copd  adm with pna  s/p bronch for hemoptysis, oozing from rul but no lesion found  had been on 1e but developed hypothermia, hypotension, unresponsiveness and hypercarbic resp failure  she has not been wearing her bipap, and has been told that this is likely to result in recurrent episodes of severe hypercarbic resp failure  no moved to icu and is on avaps with improvement  normal lvef with lvh and mod-sev pulm htn, no need to repeat at this time  has had degree of vol ol at times, now appears euvolemic, though pocus in the icu suggests degree of vol ol given incr ivc  can consider cautious diuresis  had been on torsemide, now on iv lasix  no acute ischemia   bp had been stable off Midodrine and Northera, and off steroids, noting addl hx of dysautonomia  consider stress dose steroids if bp does not rebound  pressors as appropriate  paf and vte was on ac, now off 2/2 hemoptysis   to remains off for now as per pulm given recurrent bleeding   episodes pat, cont amio and bb.

## 2025-06-28 NOTE — PROVIDER CONTACT NOTE (CRITICAL VALUE NOTIFICATION) - PERSON GIVING RESULT:
Herman LR
Antony LARSEN
Carey McmullenRRT
LD
LD
Carey McmullenRRT
Sylvain Clay RRT
Carey McmullenRRT

## 2025-06-28 NOTE — PROVIDER CONTACT NOTE (CRITICAL VALUE NOTIFICATION) - NAME OF MD/NP/PA/DO NOTIFIED:
ICU CC MD Dr. Fonseca
ICU CC MD DR Fonseca
ICU CC MD Dr. Caren Thomas
NP Gabriel
ICU PA
ICU PA
Dr Baxter
MD Dr Jonathan Baxter

## 2025-06-28 NOTE — PROGRESS NOTE ADULT - ASSESSMENT
84F h/o AFib on Eliquis, COPD, CKD, aplastic anemia, polymyalgia rheumatica on chronic steroids, avascular necrosis of hips, HTN, HLD, with recent admission to [place] 5/26 - 6/5 for acute HFpEF exacerbation and COPD exacerbation, discharged to rehab on 6/5 and returning today with right sided chest pain, general malaise and feeling unwell. She reports some slight cough though otherwise no other new symptoms reported. Found to be hypotensive, febrile w/ leukocytosis. Admitted to ICU for septic shock likely 2/2 to PNA and KIMBERLY. downgraded to tele. s/p RRT 6/28 for hypothermia, hypotension and lethargy    Septic shock secondary to PNA   hemoptysis     - s/p bronch with blood in the RUL. AC held.     - weaned off droxidopa/midodrine. Weaning steroids as tolerated. solucortef 25mg given     - on zosyn TID, last dose 6/29.     - Pulm/ID following     Aplastic anemia    - eliquis held. patient had recurrent hemoptysis with evidence of bleeding on bronch. s/p prbc on 6/23.     KIMBERLY/CKD3, prerenal azotemia     - nephrology following     - potassium supplemented     COPD  hypercapnic respiratory failure    - pulm following    - on AVAPs.  wean to keep pulse ox >88%     - cont advair, duoneb, hypertonic saline QID    - ABG pending      afib     - cardio following.     - cont amiodarone 100mg daily     - BB held for hypotension     - torsemide 20mg held     PMR    - on chronic prednisone 5mg daily at home. increased given hypotension     HLD    - cont statin     DVT proph: SCDs given hemoptysis    OOB to chair   attempted to call daughter x 2. left VM for callback   discuss with ICU   pending ABG. placed on bipap. s/p solucortef. CXR pending.     84F h/o AFib on Eliquis, COPD, CKD, aplastic anemia, polymyalgia rheumatica on chronic steroids, avascular necrosis of hips, HTN, HLD, with recent admission to [facility] 5/26 - 6/5 for acute HFpEF exacerbation and COPD exacerbation, discharged to rehab on 6/5 and returning today with right sided chest pain, general malaise and feeling unwell. She reports some slight cough though otherwise no other new symptoms reported. Found to be hypotensive, febrile w/ leukocytosis. Admitted to ICU for septic shock likely 2/2 to PNA and KIMBERLY. downgraded to tele. s/p RRT 6/28 for hypothermia, hypotension and lethargy    Septic shock secondary to PNA   hemoptysis     - s/p bronch with blood in the RUL. AC held.     - weaned off droxidopa/midodrine. Weaning steroids as tolerated. solucortef 25mg given     - on zosyn TID, last dose 6/29.     - Pulm/ID following     Aplastic anemia    - eliquis held. patient had recurrent hemoptysis with evidence of bleeding on bronch. s/p prbc on 6/23.     KIMBERLY/CKD3, prerenal azotemia     - nephrology following     - potassium supplemented     COPD  hypercapnic respiratory failure    - pulm following    - on AVAPs.  wean to keep pulse ox >88%     - cont advair, duoneb, hypertonic saline QID    - ABG pending      afib     - cardio following.     - cont amiodarone 100mg daily     - BB held for hypotension     - torsemide 20mg held     PMR    - on chronic prednisone 5mg daily at home. increased given hypotension     HLD    - cont statin     DVT proph: SCDs given hemoptysis    OOB to chair   attempted to call daughter x 2. left VM for callback   discuss with ICU   ABG reviewed. placed on bipap. s/p solucortef. plan to increase back to stress dose. BP soft, may need pressors. CXR pending.

## 2025-06-28 NOTE — PROVIDER CONTACT NOTE (OTHER) - SITUATION
S/p iv bolus 250ml and repeated bp 70/50  lethargic and awake with voice
Pt A&O x4 admitted with pneumonia. Pt c/o right side pain.
pt c/o bloody sputum. stated she has been having this since last night.

## 2025-06-28 NOTE — CHART NOTE - NSCHARTNOTEFT_GEN_A_CORE
Rapid Response Note:    Rapid response was called at 8:26am for increased WOB.    Events leading up to Rapid Response:     Patient was seen and examined at the bedside by the rapid response team. Dr. Fernando, Dr. Pleitez, ICU PA at bedside.       Rapid Response Vital Signs:   BP: 92/62 --> manual 96/43  HR: 61   RR: 18  SpO2: 95% on BiPAP     Temp:   FS: 107    T(C): 34.9 (06-28-25 @ 06:36), Max: 36.9 (06-27-25 @ 12:32)  HR: 64 (06-28-25 @ 08:18) (64 - 88)  BP: 94/55 (06-28-25 @ 06:36) (84/35 - 114/59)  RR: 18 (06-28-25 @ 06:36) (18 - 19)  SpO2: 98% (06-28-25 @ 08:18) (94% - 99%)    Physical Exam:   Gen: well appearing, NAD   HEENT: NCAT, PEERLA b/l, EOMI b/l   Cardio: regular rate and rhythm, +s1s2, no murmurs, rubs, or gallops   Pulm: CTA b/l, no wheezes, rales or rhonchi   Abdomen: soft, nontender, nondistended, +BS x4 quadrants, no guarding   Extremities: no cyanosis or edema, +2 pedal pulses   Neuro: AAOx3, CNII-XII intact, 5/5 strength all extremities, sensation intact   Skin: warm and dry       Assessment/Plan: 84 female with history of A-fib on Eliquis, COPD, CKD, aplastic anemia, polymyalgia rheumatica, on chronic steroids, avascular necrosis of hips, HLD, HTN, DM, recent admission to [facility] 5/26 through 6/5/2025 for CHF/fluid overload/COPD exacerbation, present to the ED form [facility] for evaluation of fever and generalized feeling of being unwell, found to be septic and hypotensive on 6/8 admission, admitted for ICU briefly for vasopressors support and placed on floor. Readmitted to ICU for AMS and increased WOB suspected 2/2 hypercapnia. Responded well to BiPAP and was downgraded to floor. Rapid response called for increased WOB.    - STAT ABG ordered  - NS bolus 250cc STAT ordered  -Will observe on floors for now and re[place]ala[place] need for ICU transfer  -RN to call if any changes.   -Discussed with Dr. Pleitez, agrees with above plan   -Family updated by ____ Rapid Response Note:    Rapid response was called at 8:26am for AMS and hypotension. Patient did not wear her bipap overnight.    Events leading up to Rapid Response:     This morning, RN Called for patient dizziness.  - Patient reports feeling dizzy after waking this AM for vitals check. Associated symptoms of generalized weakness and sweating  - Seen and examined at bedside, vitals were checked found to have 94.9F temperature and BP 94/55, GERMAN hugger and warm compress initiated  - AM meds were held except amiodarone and zosyn  - 250CC Bolus ordered stat along with CXR, UA, ABG and 25mg Solucortef and 5mg midodrine      Patient was seen and examined at the bedside by the rapid response team. Dr. Fernando, Dr. Pleitez, ICU PA at bedside.       Rapid Response Vital Signs:   BP: 92/62 --> manual 96/43  HR: 61   RR: 18  SpO2: 95% on BiPAP     Temp: 94.9  FS: 107    T(C): 34.9 (06-28-25 @ 06:36), Max: 36.9 (06-27-25 @ 12:32)  HR: 64 (06-28-25 @ 08:18) (64 - 88)  BP: 94/55 (06-28-25 @ 06:36) (84/35 - 114/59)  RR: 18 (06-28-25 @ 06:36) (18 - 19)  SpO2: 98% (06-28-25 @ 08:18) (94% - 99%)    Physical Exam:   Gen: lethargic, able to open eyes briefly  HEENT: NCAT  Cardio: regular rate and rhythm, +s1s2  Pulm: decreased breath sounds b/l  Abdomen: soft, nontender, nondistended  Extremities: trace edema, +2 pedal pulses   Neuro: lethargic  Skin: warm and dry       Assessment/Plan: 84 female with history of A-fib on Eliquis, COPD, CKD, aplastic anemia, polymyalgia rheumatica, on chronic steroids, avascular necrosis of hips, HLD, HTN, DM, recent admission to [place] 5/26 through 6/5/2025 for CHF/fluid overload/COPD exacerbation, present to the ED form [facility] for evaluation of fever and generalized feeling of being unwell, found to be septic and hypotensive on 6/8 admission, admitted for ICU briefly for vasopressors support and placed on floor. Readmitted to ICU for AMS and increased WOB suspected 2/2 hypercapnia. Responded well to BiPAP and was downgraded to floor. The patient did not use her bipap last night. Rapid response called for AMS likely 2/2 hypercapnia and hypotension.    - STAT ABG ordered -- pCO2 92  - Placed on BIPAP  - NS bolus 250cc x2 ordered  - BP with poor response to fluids, now 80s/30s  - Transfer to ICU, will initiate pressor support  - Discussed with Dr. Pleitez/, agrees with above plan   - Daughter updated by Dr. Fernando  - Full code

## 2025-06-28 NOTE — CONSULT NOTE ADULT - ASSESSMENT
84 female PMH of A-fib on Eliquis, COPD, CKD, aplastic anemia, polymyalgia rheumatica, on chronic steroids, avascular necrosis of hips, HLD, HTN, DM, recent admission to [place] 5/26 through 6/5/2025 for CHF/fluid overload/COPD exacerbation, presents to the ED form [facility] for evaluation of fever and generalized feeling of being unwell, found to be septic and hypotensive on 6/8 admission, admitted for ICU briefly for vasopressors support and placed on floor. MICU reconsulted on 6/22 for hypoxemia/hypercapnia during the day at that time pt appeared well on HFNC 30%fio2 therefore pt stayed on GMF. RRT this am for and hypercapnic respiratory failure      Neuro:   -baseline alert/oriented  -lethargic, refused avap ovn, pH, Arterial: 7.25  pH, Blood: x     /  pCO2: 92    /  pO2: 75    / HCO3: 40    / Base Excess: 13.1  /  SaO2: 98.4    -c/w avap    CV:   -hx of CHF on lasix and amiodarone at home   -continue lasix 40mg qd   -hypotensive at rrt, 70/40 1L bolus  -start noreepi for map>65    Resp:   -Acute on chronic hypercapnic respiratory failure with CHF exacerbation and superimposed pneumonia.   -plurel edema with L plurel Eff, c/w lasix 40qd  -pt doing well this AM, can monitor off Bipap during the day. Will need Bipap at night   -stress steroids solucortef 50 q6h  -ABG   -continue bronchodilators. Head of Bed>30 for aspiration prevention.  -pt with episodes of hemoptysis, now resolved. Pulm and Heme following     Diet:  -NPO for now     Renal:   -Cr 1.40, increased from baseline  -i/o  -c/w lasix    ID:   -continue Zosyn for PNA till 6/29  -monitor for fevers and WBC     Heme:   -Pt has aplastic anemia, receiving PRBC transfusions ~8 weeks (via Rt subclavian mediport)   -Pt's baseline appears to be around 8? Hgb 8.7.   -No signs of yemi bleeding, off full AC now.  84 female PMH of A-fib on Eliquis, COPD, CKD, aplastic anemia, polymyalgia rheumatica, on chronic steroids, avascular necrosis of hips, HLD, HTN, DM, recent admission to [place] 5/26 through 6/5/2025 for CHF/fluid overload/COPD exacerbation, presents to the ED form [facility] for evaluation of fever and generalized feeling of being unwell, found to be septic and hypotensive on 6/8 admission, admitted for ICU briefly for vasopressors support and placed on floor. MICU reconsulted on 6/22 for hypoxemia/hypercapnia during the day at that time pt appeared well on HFNC 30%fio2 therefore pt stayed on GMF. RRT this am for and hypercapnic respiratory failure      Neuro:   -baseline alert/oriented  -lethargic, refused avap ovn, pH, Arterial: 7.25  pH, Blood: x     /  pCO2: 92    /  pO2: 75    / HCO3: 40    / Base Excess: 13.1  /  SaO2: 98.4    -c/w avap    CV:   -hx of CHF on lasix and amiodarone at home   -continue lasix 40mg qd   -hypotensive at rrt, 70/40 1L bolus  -start noreepi for map>65    #Paroxysmal atrial fibrillation   - off Eliquis at 2.5 bid 2/2 hemoptysis.   - Per pulm, will need to be off ac for at least a month given recurrence of bleeding.  - hypotensive on levo, holding BB  - Continue Amiodarone 100 mg daily    Resp:   -Acute on chronic hypercapnic respiratory failure with CHF exacerbation and superimposed pneumonia.   -plurel edema with L plurel Eff, c/w lasix 40qd  -pt doing well this AM, can monitor off Bipap during the day. Will need Bipap at night   -stress steroids solucortef 50 q6h  -ABG   -continue bronchodilators. Head of Bed>30 for aspiration prevention.  -pt with episodes of hemoptysis, now resolved. Pulm and Heme following     Diet:  -NPO for now     Renal:   -Cr 1.40, increased from baseline  -i/o  -lasix 80 now, c/w 40 daily    ID:   -continue Zosyn for PNA till 6/29  -monitor for fevers and WBC     Heme:   -Pt has aplastic anemia, receiving PRBC transfusions ~8 weeks (via Rt subclavian mediport)   -Pt's baseline appears to be around 8? Hgb 8.7.   -No signs of yemi bleeding, off full AC now.

## 2025-06-28 NOTE — CHART NOTE - NSCHARTNOTEFT_GEN_A_CORE
RN Called for patient dizziness  - Patient reports feeling dizzy after waking this AM for vitals check. Associated symptoms of generalized weakness and sweating  - Seen and examined at bedside, vitals were checked found to have 94.9F temperature and BP 94/55  - AM meds were held except amiodarone and zosyn  - 250CC Bolus ordered stat along with CXR, UA, ABG and 25mg Solucortef and 5mg midodrine  - Dr Pleitez notified aware of plan and in agreement  RN to notify for further updates RN Called for patient dizziness  - Patient reports feeling dizzy after waking this AM for vitals check. Associated symptoms of generalized weakness and sweating  - Seen and examined at bedside, vitals were checked found to have 94.9F temperature and BP 94/55, GERMAN hugger and warm compress initiated  - AM meds were held except amiodarone and zosyn  - 250CC Bolus ordered stat along with CXR, UA, ABG and 25mg Solucortef and 5mg midodrine  - Dr Pleitez notified, aware of plan and in agreement  RN to notify for further updates

## 2025-06-28 NOTE — PROGRESS NOTE ADULT - SUBJECTIVE AND OBJECTIVE BOX
Patient is a 84y old  Female who presents with a chief complaint of septic shock, PNA (2025 11:05)      BRIEF HOSPITAL COURSE: ***    Events last 24 hours: ***      PAST MEDICAL & SURGICAL HISTORY:  COPD (chronic obstructive pulmonary disease)      DM (diabetes mellitus)  diet-controlled      PAF (paroxysmal atrial fibrillation)  s/p ablation      Hiatal hernia      H/O aplastic anemia      History of IBS      H/O osteoporosis      HLD (hyperlipidemia)      PMR (polymyalgia rheumatica)      History of basal cell cancer      Chronic kidney disease (CKD)      Hypotension      Presence of IVC filter      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      History of immunodeficiency      Lumbar compression fracture      H/O hiatal hernia      H/O pulmonary fibrosis      H/O sinus bradycardia      History of fracture of right hip  "unoperable"      S/P hysterectomy      S/P foot surgery      S/P knee surgery      After cataract  bilateral eye cataract surgically removed      Closed right hip fracture  rigth hip ORIF 2016      S/P IVC filter  2016      S/P carpal tunnel release  Right 2008 & 17      H/O kyphoplasty      Port-A-Cath in place  right chest        Allergies    No Known Allergies    Intolerances      FAMILY HISTORY:  Family history of bladder cancer (Mother)    Family history of myocardial infarction (Father)    FH: atrial fibrillation (Father)        Review of Systems:  CONSTITUTIONAL: No fever, chills, or fatigue  EYES: No eye pain, visual disturbances, or discharge  ENMT:  No difficulty hearing, tinnitus, vertigo; No sinus or throat pain  NECK: No pain or stiffness  RESPIRATORY: No cough, wheezing, chills or hemoptysis; No shortness of breath  CARDIOVASCULAR: No chest pain, palpitations, dizziness, or leg swelling  GASTROINTESTINAL: No abdominal or epigastric pain. No nausea, vomiting, or hematemesis; No diarrhea or constipation. No melena or hematochezia.  GENITOURINARY: No dysuria, frequency, hematuria, or incontinence  NEUROLOGICAL: No headaches, memory loss, loss of strength, numbness, or tremors  SKIN: No itching, burning, rashes, or lesions   MUSCULOSKELETAL: No joint pain or swelling; No muscle, back, or extremity pain  PSYCHIATRIC: No depression, anxiety, mood swings, or difficulty sleeping      Social History: ***      Medications:  piperacillin/tazobactam IVPB.. 3.375 Gram(s) IV Intermittent every 8 hours    aMIOdarone    Tablet 100 milliGRAM(s) Oral daily  furosemide   Injectable 40 milliGRAM(s) IV Push daily  norepinephrine Infusion 0.05 MICROgram(s)/kG/Min IV Continuous <Continuous>    albuterol/ipratropium for Nebulization 3 milliLiter(s) Nebulizer three times a day  fluticasone propionate/ salmeterol 500-50 MICROgram(s) Diskus 1 Dose(s) Inhalation two times a day  sodium chloride 3%  Inhalation 4 milliLiter(s) Inhalation three times a day    acetaminophen     Tablet .. 650 milliGRAM(s) Oral every 6 hours PRN  melatonin 5 milliGRAM(s) Oral at bedtime        pantoprazole  Injectable 40 milliGRAM(s) IV Push daily      dextrose 50% Injectable 12.5 Gram(s) IV Push once  dextrose 50% Injectable 25 Gram(s) IV Push once  dextrose 50% Injectable 25 Gram(s) IV Push once  dextrose Oral Gel 15 Gram(s) Oral once PRN  glucagon  Injectable 1 milliGRAM(s) IntraMuscular once  hydrocortisone sodium succinate Injectable 50 milliGRAM(s) IV Push every 6 hours  insulin lispro (ADMELOG) corrective regimen sliding scale   SubCutaneous three times a day before meals    dextrose 5%. 1000 milliLiter(s) IV Continuous <Continuous>  dextrose 5%. 1000 milliLiter(s) IV Continuous <Continuous>      chlorhexidine 2% Cloths 1 Application(s) Topical <User Schedule>            ICU Vital Signs Last 24 Hrs  T(C): 37.3 (2025 20:24), Max: 37.3 (2025 17:08)  T(F): 99.2 (2025 20:24), Max: 99.2 (2025 20:24)  HR: 80 (2025 20:35) (54 - 82)  BP: 139/61 (2025 19:45) (70/50 - 153/56)  BP(mean): 83 (2025 19:45) (50 - 92)  ABP: --  ABP(mean): --  RR: 20 (2025 19:45) (11 - 25)  SpO2: 96% (2025 20:35) (90% - 100%)    O2 Parameters below as of 2025 19:15  Patient On (Oxygen Delivery Method): BiPAP/CPAP          Vital Signs Last 24 Hrs  T(C): 37.3 (2025 20:24), Max: 37.3 (2025 17:08)  T(F): 99.2 (2025 20:24), Max: 99.2 (2025 20:24)  HR: 80 (2025 20:35) (54 - 82)  BP: 139/61 (2025 19:45) (70/50 - 153/56)  BP(mean): 83 (2025 19:45) (50 - 92)  RR: 20 (2025 19:45) (11 - 25)  SpO2: 96% (2025 20:35) (90% - 100%)    Parameters below as of 2025 19:15  Patient On (Oxygen Delivery Method): BiPAP/CPAP        ABG - ( 2025 12:32 )  pH, Arterial: 7.35  pH, Blood: x     /  pCO2: 74    /  pO2: 62    / HCO3: 41    / Base Excess: 15.3  /  SaO2: 95.7                I&O's Detail    2025 07:01  -  2025 07:00  --------------------------------------------------------  IN:    IV PiggyBack: 150 mL  Total IN: 150 mL    OUT:    Voided (mL): 340 mL  Total OUT: 340 mL    Total NET: -190 mL      2025 07:01  -  2025 21:08  --------------------------------------------------------  IN:    IV PiggyBack: 500 mL    IV PiggyBack: 100 mL    Norepinephrine: 86.4 mL  Total IN: 686.4 mL    OUT:  Total OUT: 0 mL    Total NET: 686.4 mL            LABS:                        8.7    9.71  )-----------( Clumped    ( 2025 06:20 )             29.3     -    138  |  101  |  38[H]  ----------------------------<  142[H]  4.1   |  32[H]  |  1.40[H]    Ca    8.4[L]      2025 06:20  Phos  0.7       Mg     1.7         TPro  6.1  /  Alb  2.6[L]  /  TBili  0.4  /  DBili  x   /  AST  20  /  ALT  18  /  AlkPhos  63            CAPILLARY BLOOD GLUCOSE      POCT Blood Glucose.: 153 mg/dL (2025 17:00)      Urinalysis Basic - ( 2025 16:20 )    Color: Yellow / Appearance: Clear / S.013 / pH: x  Gluc: x / Ketone: x  / Bili: Negative / Urobili: 0.2 mg/dL   Blood: x / Protein: Trace mg/dL / Nitrite: Negative   Leuk Esterase: Moderate / RBC: 3 /HPF / WBC 10 /HPF   Sq Epi: x / Non Sq Epi: x / Bacteria: Occasional /HPF      CULTURES:  Culture Results:   Commensal marilee consistent with body site ( @ 15:00)        Physical Examination:    General: No acute distress.  Alert, oriented, interactive, nonfocal    HEENT: Pupils equal, reactive to light.  Symmetric.    PULM: Clear to auscultation bilaterally, no significant sputum production    CVS: Regular rate and rhythm, no murmurs, rubs, or gallops    ABD: Soft, nondistended, nontender, normoactive bowel sounds, no masses    EXT: No edema, nontender    SKIN: Warm and well perfused, no rashes noted.    NEURO: A&Ox3, strength 5/5 all extremities, cranial nerves grossly intact, no focal deficits      RADIOLOGY: ***        CRITICAL CARE TIME SPENT: ***  Time spent evaluating/treating patient with medical issues that pose a high risk for life threatening deterioration and/or end-organ damage, reviewing data/labs/imaging, discussing case with multidisciplinary team, discussing plan/goals of care with patient/family. Non-inclusive of procedure time. The date of entry of this note reflects the date of services rendered.     84F with HTN, HLD, COPD, HFpEF, paroxysmal afib on eliquis, DVT/PE s/p IVC filter, COPD, CKD, aplastic anemia requiring pRBC transfusion s/p chest wall port, polymyalgia rheumatica on steroids, AVN of hips, initially admitted to the ICU  with septic shock 2/2 RML PNA requiring levophed for hemodynamic support. Since transferred to the floor.     RRT  due to acute on chronic hypercapnic respiratory failure due to NIPPV noncompliance. ABG 7./57/42. Re-admitted to ICU. Improved with AVAPS and transferred back to [facility]. Re-admitted again  due to acute on chronic hypercapnic respiratory failure, was obtunded, 2/2 NIPPV nonadherence.     Events last 24 hours: Remains on AVAPS. Repeat ABG improved. On low-dose levophed.       PAST MEDICAL & SURGICAL HISTORY:  COPD (chronic obstructive pulmonary disease)      DM (diabetes mellitus)  diet-controlled      PAF (paroxysmal atrial fibrillation)  s/p ablation      Hiatal hernia      H/O aplastic anemia      History of IBS      H/O osteoporosis      HLD (hyperlipidemia)      PMR (polymyalgia rheumatica)      History of basal cell cancer      Chronic kidney disease (CKD)      Hypotension      Presence of IVC filter      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      History of immunodeficiency      Lumbar compression fracture      H/O hiatal hernia      H/O pulmonary fibrosis      H/O sinus bradycardia      History of fracture of right hip  "unoperable"      S/P hysterectomy      S/P foot surgery      S/P knee surgery      After cataract  bilateral eye cataract surgically removed      Closed right hip fracture  rigth hip ORIF 2016      S/P IVC filter  2016      S/P carpal tunnel release  Right  & 17      H/O kyphoplasty      Port-A-Cath in place  right chest        Allergies    No Known Allergies    Intolerances      FAMILY HISTORY:  Family history of bladder cancer (Mother)    Family history of myocardial infarction (Father)    FH: atrial fibrillation (Father)      Medications:  piperacillin/tazobactam IVPB.. 3.375 Gram(s) IV Intermittent every 8 hours    aMIOdarone    Tablet 100 milliGRAM(s) Oral daily  furosemide   Injectable 40 milliGRAM(s) IV Push daily  norepinephrine Infusion 0.05 MICROgram(s)/kG/Min IV Continuous <Continuous>    albuterol/ipratropium for Nebulization 3 milliLiter(s) Nebulizer three times a day  fluticasone propionate/ salmeterol 500-50 MICROgram(s) Diskus 1 Dose(s) Inhalation two times a day  sodium chloride 3%  Inhalation 4 milliLiter(s) Inhalation three times a day    acetaminophen     Tablet .. 650 milliGRAM(s) Oral every 6 hours PRN  melatonin 5 milliGRAM(s) Oral at bedtime        pantoprazole  Injectable 40 milliGRAM(s) IV Push daily      dextrose 50% Injectable 12.5 Gram(s) IV Push once  dextrose 50% Injectable 25 Gram(s) IV Push once  dextrose 50% Injectable 25 Gram(s) IV Push once  dextrose Oral Gel 15 Gram(s) Oral once PRN  glucagon  Injectable 1 milliGRAM(s) IntraMuscular once  hydrocortisone sodium succinate Injectable 50 milliGRAM(s) IV Push every 6 hours  insulin lispro (ADMELOG) corrective regimen sliding scale   SubCutaneous three times a day before meals    dextrose 5%. 1000 milliLiter(s) IV Continuous <Continuous>  dextrose 5%. 1000 milliLiter(s) IV Continuous <Continuous>      chlorhexidine 2% Cloths 1 Application(s) Topical <User Schedule>            ICU Vital Signs Last 24 Hrs  T(C): 37.3 (2025 20:24), Max: 37.3 (2025 17:08)  T(F): 99.2 (2025 20:24), Max: 99.2 (2025 20:24)  HR: 80 (2025 20:35) (54 - 82)  BP: 139/61 (2025 19:45) (70/50 - 153/56)  BP(mean): 83 (2025 19:45) (50 - 92)  ABP: --  ABP(mean): --  RR: 20 (2025 19:45) (11 - 25)  SpO2: 96% (2025 20:35) (90% - 100%)    O2 Parameters below as of 2025 19:15  Patient On (Oxygen Delivery Method): BiPAP/CPAP          Vital Signs Last 24 Hrs  T(C): 37.3 (2025 20:24), Max: 37.3 (2025 17:08)  T(F): 99.2 (2025 20:24), Max: 99.2 (2025 20:24)  HR: 80 (2025 20:35) (54 - 82)  BP: 139/61 (2025 19:45) (70/50 - 153/56)  BP(mean): 83 (2025 19:45) (50 - 92)  RR: 20 (2025 19:45) (11 - 25)  SpO2: 96% (2025 20:35) (90% - 100%)    Parameters below as of 2025 19:15  Patient On (Oxygen Delivery Method): BiPAP/CPAP        ABG - ( 2025 12:32 )  pH, Arterial: 7.35  pH, Blood: x     /  pCO2: 74    /  pO2: 62    / HCO3: 41    / Base Excess: 15.3  /  SaO2: 95.7                I&O's Detail    2025 07:01  -  2025 07:00  --------------------------------------------------------  IN:    IV PiggyBack: 150 mL  Total IN: 150 mL    OUT:    Voided (mL): 340 mL  Total OUT: 340 mL    Total NET: -190 mL      2025 07:01  -  2025 21:08  --------------------------------------------------------  IN:    IV PiggyBack: 500 mL    IV PiggyBack: 100 mL    Norepinephrine: 86.4 mL  Total IN: 686.4 mL    OUT:  Total OUT: 0 mL    Total NET: 686.4 mL            LABS:                        8.7    9.71  )-----------( Clumped    ( 2025 06:20 )             29.3     06-    138  |  101  |  38[H]  ----------------------------<  142[H]  4.1   |  32[H]  |  1.40[H]    Ca    8.4[L]      2025 06:20  Phos  0.7       Mg     1.7         TPro  6.1  /  Alb  2.6[L]  /  TBili  0.4  /  DBili  x   /  AST  20  /  ALT  18  /  AlkPhos  63            CAPILLARY BLOOD GLUCOSE      POCT Blood Glucose.: 153 mg/dL (2025 17:00)      Urinalysis Basic - ( 2025 16:20 )    Color: Yellow / Appearance: Clear / S.013 / pH: x  Gluc: x / Ketone: x  / Bili: Negative / Urobili: 0.2 mg/dL   Blood: x / Protein: Trace mg/dL / Nitrite: Negative   Leuk Esterase: Moderate / RBC: 3 /HPF / WBC 10 /HPF   Sq Epi: x / Non Sq Epi: x / Bacteria: Occasional /HPF      CULTURES:  Culture Results:   Commensal marilee consistent with body site ( @ 15:00)        Physical Examination:    General: No acute distress.  Alert, oriented, interactive, nonfocal    HEENT: Pupils equal, reactive to light.  Symmetric.    PULM: Clear to auscultation bilaterally, no significant sputum production    CVS: Regular rate and rhythm, no murmurs, rubs, or gallops    ABD: Soft, nondistended, nontender.    EXT: No edema, nontender    SKIN: Warm and well perfused, no rashes noted.    NEURO: A&Ox2, strength 5/5 all extremities, cranial nerves grossly intact, no focal deficits

## 2025-06-28 NOTE — PROGRESS NOTE ADULT - SUBJECTIVE AND OBJECTIVE BOX
Patient is a 84y old  Female who presents with a chief complaint of septic shock, PNA (28 Jun 2025 07:49)    INTERVAL HPI/OVERNIGHT EVENTS: Patient was seen and examined. lethargic, minimally arousable. on warming blanket. s/p solucortef 25mg IV x 1 dose today. refused NIV overnight. patient is hypotensive at bedside s/p 250 cc bolus. RRT called.     I&O's Summary    27 Jun 2025 07:01  -  28 Jun 2025 07:00  --------------------------------------------------------  IN: 150 mL / OUT: 340 mL / NET: -190 mL        LABS:                        8.7    9.71  )-----------( Clumped    ( 28 Jun 2025 06:20 )             29.3     06-28    138  |  101  |  38[H]  ----------------------------<  142[H]  4.1   |  32[H]  |  1.40[H]    Ca    8.4[L]      28 Jun 2025 06:20  Mg     1.7     06-27    TPro  6.1  /  Alb  2.6[L]  /  TBili  0.4  /  DBili  x   /  AST  20  /  ALT  18  /  AlkPhos  63  06-28      Urinalysis Basic - ( 28 Jun 2025 06:20 )    Color: x / Appearance: x / SG: x / pH: x  Gluc: 142 mg/dL / Ketone: x  / Bili: x / Urobili: x   Blood: x / Protein: x / Nitrite: x   Leuk Esterase: x / RBC: x / WBC x   Sq Epi: x / Non Sq Epi: x / Bacteria: x      CAPILLARY BLOOD GLUCOSE      POCT Blood Glucose.: 107 mg/dL (28 Jun 2025 08:28)  POCT Blood Glucose.: 150 mg/dL (28 Jun 2025 07:53)  POCT Blood Glucose.: 155 mg/dL (28 Jun 2025 06:48)  POCT Blood Glucose.: 119 mg/dL (27 Jun 2025 21:36)  POCT Blood Glucose.: 118 mg/dL (27 Jun 2025 16:58)  POCT Blood Glucose.: 119 mg/dL (27 Jun 2025 11:41)        Urinalysis Basic - ( 28 Jun 2025 06:20 )    Color: x / Appearance: x / SG: x / pH: x  Gluc: 142 mg/dL / Ketone: x  / Bili: x / Urobili: x   Blood: x / Protein: x / Nitrite: x   Leuk Esterase: x / RBC: x / WBC x   Sq Epi: x / Non Sq Epi: x / Bacteria: x        MEDICATIONS  (STANDING):  albuterol/ipratropium for Nebulization 3 milliLiter(s) Nebulizer three times a day  aMIOdarone    Tablet 100 milliGRAM(s) Oral daily  chlorhexidine 2% Cloths 1 Application(s) Topical <User Schedule>  dextrose 5%. 1000 milliLiter(s) (50 mL/Hr) IV Continuous <Continuous>  dextrose 5%. 1000 milliLiter(s) (100 mL/Hr) IV Continuous <Continuous>  dextrose 50% Injectable 12.5 Gram(s) IV Push once  dextrose 50% Injectable 25 Gram(s) IV Push once  dextrose 50% Injectable 25 Gram(s) IV Push once  fluticasone propionate/ salmeterol 500-50 MICROgram(s) Diskus 1 Dose(s) Inhalation two times a day  gabapentin 400 milliGRAM(s) Oral every 12 hours  glucagon  Injectable 1 milliGRAM(s) IntraMuscular once  insulin lispro (ADMELOG) corrective regimen sliding scale   SubCutaneous three times a day before meals  melatonin 5 milliGRAM(s) Oral at bedtime  metoprolol tartrate 25 milliGRAM(s) Oral two times a day  pantoprazole  Injectable 40 milliGRAM(s) IV Push daily  piperacillin/tazobactam IVPB.. 3.375 Gram(s) IV Intermittent every 8 hours  predniSONE   Tablet 10 milliGRAM(s) Oral daily  sodium chloride 0.9% Bolus 250 milliLiter(s) IV Bolus once  sodium chloride 3%  Inhalation 4 milliLiter(s) Inhalation three times a day  torsemide 20 milliGRAM(s) Oral daily    MEDICATIONS  (PRN):  acetaminophen     Tablet .. 650 milliGRAM(s) Oral every 6 hours PRN Mild Pain (1 - 3)  dextrose Oral Gel 15 Gram(s) Oral once PRN Blood Glucose LESS THAN 70 milliGRAM(s)/deciliter      REVIEW OF SYSTEMS:  unable to obtain     RADIOLOGY & ADDITIONAL TESTS:    Imaging Personally Reviewed:  [x] YES  [ ] NO    Consultant(s) Notes Reviewed:  [x] YES  [ ] NO    PHYSICAL EXAM:  T(C): 34.9 (06-28-25 @ 06:36), Max: 36.9 (06-27-25 @ 12:32)  HR: 64 (06-28-25 @ 08:18) (64 - 88)  BP: 94/55 (06-28-25 @ 06:36) (84/35 - 114/59)  RR: 18 (06-28-25 @ 06:36) (18 - 19)  SpO2: 98% (06-28-25 @ 08:18) (94% - 99%)    GENERAL: lethargic   HEENT:  anicteric, moist mucous membranes  CHEST/LUNG:  CTA b/l, no rales, wheezes, or rhonchi  HEART:  RRR, S1, S2  ABDOMEN:  BS+, soft, nontender, nondistended  EXTREMITIES: + edema  NERVOUS SYSTEM: lethargic     Care Discussed with Consultants/Other Providers [x] YES  [ ] NO

## 2025-06-28 NOTE — PROGRESS NOTE ADULT - ASSESSMENT
REASON FOR VISIT  .. Management of problems listed below      EVENTS/CURRENT ISSUES.  . 6/28/2025 tr to icu (had refused avaps last noc)   . 6/26/2025 No further hemoptysis while off apixaba   . 6/24/2025 tr out of icu   . 6/23/2025 tr icu  . 6/22/2025 worsening gas exchange placed on hfnc apixa stoppd   . 6/20/2025 apixa restartd and noc bpap orderde   . 6/16/2025 fob done oozing rul and l lo lobe post basal   . 6/14/2025 iv ufh dced   . 6/13/2025 continues to have mild hemoptysis but is also on iv ufh drip   . 6/13/2025 dw pt re rbaa of bronch and se agrees scheduled for 6/16 10 in or   . 6/13/2025 dw cardio and id   . 6/11/2025 qft funfitell and galactomannan orderd   . 6/11/2025 pt wa sstarted on iv ufh for hemoptysis and pleuritic chest pain but was stopped when vq showed vlp   . 6/10/2025 Mild hemoptysis   . 6/9 tr out of icu  . 6/8/2025  . PNEUMONIA RML 6/8/2025  . SHOCK 6/8/2025   . NOREPI 6/8/2025   . STRESS STEROIDS   .... HYDROCORTISONE 6/8/2025  . A fib (chronic) POA 6/8/2025  . ANEMIA Hb 6/8/2025 Hb 7.5  . KIMBERLY ON CKD Cr 6/8/2025 Cr 1.9        REVIEW OF SYMPTOMS   Able to give ROS  Yes     RELIABILITY +/-   CONSTITUTIONAL Weakness Yes    ENDOCRINE  No heat or cold intolerance    ALLERGY No hives  Sore throat No stridor  RESP Shortness of breath YES   NEURO New weakness No   CARDIAC   Palpitations No         PHYSICAL EXAM    HEENT Unremarkable  atraumatic   RESP Fair air entry  Harsh breath sound   CARDIAC S1 S2 No S3     NO JVD    ABDOMEN No hepatosplenomegaly   PEDAL EDEMA present No calf tenderness    REASON FOR VISIT  .. Management of problems listed below      CC.   . 6/8/2025 brought in by ems for right sided chest pain that started at 3am- nonradiating- patient was offered aspirin by ems- patient refused and stated to ems that she is on plavix and was advised not to take aspirin. patient on 43 litres nasl cannula-     OVERALL PRESENTATION.  . 6/8/2025 84 yr old female with PMHx afib on eliquis, COPD (not on home O2), PE/Rt lower extremity DVT 2017, Rt LE IVC filter 2017 CKD3, aplastic anemia, polymyalgia rheumatica on prednisone 5 mg po qd, requiring PRBC transfusions ~8 weeks (via Rt subclavian mediport), AVN bilat hips, HTN, HLD, HFpEF (75% 6.2.25), diastolic dysfx grade1, recent hospitalization 5/25/25-6/5/25 for ADHF/COPD exacerbation, Pt with eventual improvement and transfer to [facility] facility from where she presents to E.D. this a.m. 6/8/25 with c/o Rt sided chest pain. general malaise. Pt stated began to feel ill evening before presentation, daughter this a.m. endorsed pt lethargic, pale.     In E.D. Pt found to have fever with tmax of 101, KIMBERLY on CKD with sCr 1.90 (baseline 1.2-1.6), HYPOtnsive with SBP 80's (pt on midodrine therapy, usual 's). In addition found to have leukocytosis of 37.6 (baseline 5.25 6/5/25), procalcitonin of 13.6, Hgb 7.5, elevated INR 2.27,  Chest xray demonstrated RML consolidations, concerning for pneum. In E.D. pt receiving 500 cc's NS, Vanco 1 gm, zosyn. Pt received tylenol 1 gm IV x1.     PMH.        BEST PRACTICE ISSUES.  . HOB ELEVATN.    .... Yes  . DIET  .   .... DASH 6/8/2025   . FREE WATER.   ....   .  IV FLUID.  .... 6/15-6/20/2025 1/2 ns 50   ..... 6/8 lr 40 x 12 h   . PHARMAC DVT PPLX .    .... 6/22 apixa dced suspect hemoptysis   .... 6/20 apixa 2.5 x 2   .... 6/12-6/14/2025 iv ufh (hospitalist)  .... 6/11/2025 [facility] 5k.2   .... 6/10/2025 4:46 PM iv ufh   .... [facility] [place] 6/9-6/10/2025   . NON PHARMAC DVT PPLX .    .... 6/23/2025 compr device   . STRESS ULCR PPLX .   .... PROTONIX 40 6/22/2025   . DATE/DM MGMT.   ..... See under Endocrine section   GENERAL DATA .   . COVID.         .... scv2 6/8/2025 scv2 (-)   . GOC.    ....    . ICU STAY.   .... 6/28/2025   .... 6/22 - 6/25   .... 6/8-6/9   . INFECTION PPLX .   .... CHLORHEXIDINE 2% 6/8/2025   . ALLGY.   ....  nka  . WT.   .... 6/8/2025 69   . BMI.  ....6/8/2025 26      XXXXXXXXXXXXXXXXXXXXXXX  VITALS/GAS EXCHANGE/DRIPS    ABGS.   6/20/2025 4p 5l 736/71/65   6/22/2025 7p bpap 16/8/1 731/82/153   6/23/2025 11p bpap 12/5/.4 727/92/57  6/23/2025 2a avaps 450/18/8/25/10 .45 740/67/85   6/26/2025 5a 3l 742/70/67   .  VS/ PO/IO/ VENT/ DRIPS.  6/28/2025 65 130/60   6/28/2025 briefly needed norepi   6/28/2025 8p avaps 40% 24 30%     XXXXXXXXXXXXXXXXXXXXXXXXXXXXXXXXXXX  PROBLEM ASSESSMENT RECOMMENDATIONS.  RESP.   . GAS EXCHANGE .   .... target PO 90-95%     . NEED FOR HOME OXYGEN   .... 6/18/2025 will need home oxygen if at discharge pulse ox at rest or on exertion is below 88%     . COPD   .... DUONEB.3 6/25   .... ADVAIR 500 6/8/2025   .... MONTELUKAST 10 6/8/2025   .... SPIRIVA 6/8/2025   .... nacl 3% bid 6/22/2025   .... HYDROCORT   ........ 50.2 6/24/2025  ....... 25.2 6/25/2025      . RESP FAILURE  .... 6/8/2025 Has ho hypercapnia   .... 6/8/2025 recommend monitor abg  .... 6/18/2025 requiring 4l nc     . HYPERCAPNIC RESP FAILURE WITHOUT ACIDEMIA   6/20/2025 4p 5l 736/71/65   .... 6/20/2025 sterted noct bpap 35/15/6/16  .... 6/23 noct avaps .45 14 epap 8 vt 450 25/10     . MILD HEMOPTYSIS 6/10/2025   .... ct ch 6/10/2025 cw 5/31  ........ new mf infection in rul   ........ unchanged small r greater than left pl effsn   ....... enlarged pulm artery suggesting pulm hytn   .... 6/10/2025  dw hemat cardio id and instead of restarting apixa will start iv UFH which can be easily reversed in case Hb starts dropping but will be the rx for venous thromboembolism as well as for a fib   .... v duplx 6/10 (-)  .... vq 6/10 vlp   .... 6/13/2025 continues to have mild hemoptysis but is also on iv ufh drip   . 6/13/2025 dw pt re rbaa of bronch and se agrees scheduled for 6/16 10 in or   .... 6/13/2025 dw cardio and id   .... 6/16 fob done showed ooze rul and l lo lobe no eb lesions   .... 6/17/2025 continues to have mild hemoptysis   .... 6/17/2025 30% of hemoptysis cases no cause is found  Ordered gbm ab rf dsdna rf apla chapman   .... 6/18/2025 cbmab dsdna inderjit apla ab all (-)   .... 6/12 fungitell galactomannan (-)   .... strep legn ag 6/9 (-)   .... fob 6/16 afb sm (-) cyto (-)  .... 6/18/2025 hemoptysis likely sec to pneumonia in setting of pulm hypertension   .... 6/20/2025 apixa restarted   .... 6/22/2025 apixa dced    . PLEURITIC CHEST PAIN RO VTE   .... v duplx 6/10 (-)  .... vq 6/10 vlp   .... 6/12/2025 iv ufh was stopped 6/11 as vq vlp but was restarted by hospitalist 6/12/2025 for af   .... chest pain resolvd       INFECTION.  . DATA  .... w 6/22-6/23-6/24-6/26-6/28/2025   .... w 7.2 - 7.8 - 4.6 - 4.8 - 9.7   .... w 6/8-6/10-6/13-6/14-6/15-6/17-6/18-6/19-6/20/2025   .... w 13.6 - 9.2 - 15.9 - 18- 14 - 9.6 - 11.9- 9.8 - 8.7   .... esr 6/8/2025 esr 17   .... w 6/8-6/9/2025 w 32 - 24   .... ua 6/8/2025 w 4   .... cxr 6/8/2025 cxr dense infiltra r upper hilum r mediport  .... ct ch 6/10 cw 5/31  ........ r greater than l effs   ........ partial rll atelectasis   ........ new patchy and nodular areas of consolidation rul and associated ground glass opacities   ........ ground glass and nodular opac also scott   ........ numerous tib rml and rll     . MICROBIO  .... sp 6/8 n commensals   .... flu ab 6/8/2025 (-)   .... rsv 6/8/2025 (-)    .... mrsa 6/9/2025 (-)  .... uc 6/8 (-)  .... bc 6/8 (-)     . PNEUMONIA RUL 6/8/2025   .... AZITHRO 6/8-6/11 /2025   .... ZOSYN 6/8-6/15/2025     . PNEUMONIA   .... ct ch 6/22/2025 cw 6/10/2025   ....... incr consolidation and ggo rul   ....... new large consolidatn scott and l lo lobe   .... 6/22/2025 zosyn     CARDIAC.  . PAIN CHEST   .... 6/10/2025 co pain chest r   .... 6/10/2025 check ct to see if she has pleurisy   .... 6/10/2025  dw hemat cardio id and instead of restarting apixa will start iv UFH which can be easily reversed in case Hb starts dropping but will be the rx for venous thromboembolism as well as for a fib     . STRESS STEROIDS   .... HYDROCORTISONE   ........ 6/8/2025 h 50.4  ........ 6/10/2025 h 50.2   ........ 6/12/2025 hydrocort 50   ....... 6/14/2025 h 25  ........ 6/17/2025 dced     . SHOCK   .... MIDODRINE 6/8-6/16/2025 m 10.3   .... DROXIDOPA 6/9-6/16/2025 d 100.3   .... NOREPI 6/8-6/9/2025 n 8/250   .... target map 65 (+)     . CAD    .... Trop 6/8-6/9/2025 Tr 32- 24   .... metoprolol 25.2 6/27/2025     . LACTICEMIA   .... la 6/8/2025 la 1.4     . CHF  .... pbnp 6/8/2025 pbnp 6599   .... tte 4/2025 mild ph  n vf  .... tte 6/2/2025   ........ ef 71%   ........ n rvsf    ........ pasp 54   ....... la mod dilatd   .... lasix 40/d 6/22/2025  .... torsemide 20 6/17-6/22/2025  .... lasix 40 once 6/14/2025  .... 6/22/2025 lasix 20 once     . A fib (chronic) POA 6/8/2025  .... AMIODARONE 100 6/8/2025  .... 6/10/2025  dw hemat cardio id and instead of restarting apixa will start iv UFH which can be easily reversed in case Hb starts dropping but will be the rx for venous thromboembolism as well as for a fib   .... 6/12-6/14/2025 iv ufh     HEMAT.  . DATA  .... Plt 6/8/2025 plt 153   .... inr 6/8-6/9/2025 inr 2.27- 1.63      . ANEMIA   .... Hb 6/26-8/27-6/28/2025   .... Hb 8.3 - 8.6 - 8.7   .... Hb 6/19-6/20-6/21-6/22-6/23-6/24/2025   .... Hb 7.8 - 9.2 - 9.3 - 8.3 - 9.2 - 8.4   .... Hb 6/8-6/9-6/10-6/11-6/13-6/14-6/15-6/17-6/18/2025   .... Hb 7.5- 7.8 - 7.6- 7.1 - 8.4 - 7.3- 9.1 - 8.1 - 8.5    . TRANSFUSION  .... 6/8  1 u prbc    .... 6/14 2 u prbc   .... 6/22/2025 1 u prbc     GI.   . DIET .   .... dash 6/8/2025     . LFT MONITORING   .... LFTS    6/8/2025  ........ AP     39  ........ AST   11  ........ ALT    35    RENAL.  . DATA  .... Na 6/8/2025 Na 137  .... K 6/8/2025 K 4   .... CO2 6/8/2025 co2 29   .... ag 6/8/2025 ag 9  .... alb 6/8/2025 alb 3.4     . KIMBERLY ON CKD   .... Cr 6/8-6/9-6/10-6/11-6/13-6/14/2025   .... Cr 1.9 - 1.4 - 1.3 - 1.3 - 1.1 - 1  .... Cr 5/27-5/28-5/29-5/30-6/1/2025   .... Cr 1.2 - 1.3 - 1.3 - 1.6 - 1.6    . KIMBERLY ON CKD  .... Cr 6/28/2025 Cr 1.4     . HYPERNATREMIA   .... Na 6/24-6/26/2025 Na 147    ENDO.  . DM  .  .... 6/8/2025 SL SCALE     NEURO.  . PAIN  .... GABAPENTIN 6/8/2025 g 400.2         [id number]   SUMMARY BASELINE .     84 yr old female pt of Dr Gallegos not on home ox or home cpap used to use trelegy lives with spouse quit 40 y 1/2 ppd with PMHx afib on eliquis, COPD (not on home O2), PE/Rt lower extremity DVT 2017, Rt LE IVC filter 2017 CKD3, aplastic anemia, polymyalgia rheumatica on prednisone 5 mg po qd, requiring PRBC transfusions around 8 weeks (via Rt subclavian mediport), AVN bilat hips, HTN, HLD, HFpEF (75% 6.2.25), diastolic dysfx grade1, recent hospitalization 5/25/25-6/5/25 for ADHF/COPD exacerbation, Pt with eventual improvement and transfer to [facility] facility   CC.   . 6/8/2025 R CHEST PAIN   MAIN ISSUES.  . COPD 6/23/2025 hydrocort 50.3 6/27/2025 pred 10   . HYPERCAPNIC RESP FAILURE: 6/20/2025 4p 5l 736/71/65 6/23/2025 2a avaps 450/18/8/25/10 .45 740/67/85   .... 6/20/2025 Noct bpap 15/5/35/16 ordrd 6/23 noct avaps .45 14 epap 8 vt 450 25/10   . RESP FAILURE 6/22/2025  HFNC 6/22 5p HFNC 80% 50l po 95% tr icu   . MILD HEMOPTYSOIS 6/10/2025 6/22 apixa stoppd   . PNEUMONIA RML 6/8/2025:  ZOSYN 6/8-6/16/2025   . PLEURITIS CHEST PAIN 6/10/2025: 6/10 vte excluded vlp vq   . SHOCK 6/8/2025: tr oo icu 6/9   . NOREPI 6/8-6/9/2025   . STRESS STEROIDS : HYDROCORTISONE 6/8-6/17/2025  . A fib (chronic) POA 6/8/20256/22/2025 6/22 apixa stopped   . CHF: tte 6/2/2025  ef 71%  pasp 54  la mod dilatd 6/22 lasix 40   . ANEMIA Hb 6/8-6/13-6/25/2025 Hb 7.5- 8.4- 8.5  . TRANSFUSION 6/8  1 u prbc  6/22 1 u prbc   . KIMBERLY ON CKD Cr 6/8-6/13/2025 Cr 1.9- 1.1  . HYPERNATREMIA 6/24-6/25/2025 Na 147-146     . PNEUMONIA: 6/22 ct bl ul opac 6/22/2025 zosyn   . SUSPECTED BLEEDING IN [place] CAUSED RESP DECOMPENSATION AND NEW OPACITIES ON 6/22 CT  6/22 622 apixa stoppd     . RESP FAILURE 6/28/2025 Tr to ICU 6/28/2025 AVAPS 30% 24   . KIMBERLY ON CKD 6/28/2025 Cr 1.4    . HYPOPHOSPHATEMIA 6/28/2025 PO4 .7   PMH.   . AFon Eliquis,   . COPD (not on home o2),   . CKD,   . aplastic anemia,   . TRANSFUSION 6/2/2025 1 u prbc   . PMR (chronic prednisone with AVN hip),   . HLD,   . HTN,   . DM    PROCEDURES/DEVICES .  . 6/16/2025 FOB br wash rul ooze rul l lo lobe post basal     PAST PROCEDURES   . r mediport poa 6/8/2025     DISCUSSIONS.  .... Discussed with primary care and relevant consultants on an ongoing basis       TIME SPENT.  . Over 36 minutes aggregate care time spent on encounter; activities included   direct patient care, counseling and/or coordinating care reviewing notes, lab data/ imaging , discussion with multidisciplinary team/ patient  /family and explaining in detail risks, benefits, alternatives  of the recommendations     ROBERT HENRIQUEZ 84 6/8/2025 1942

## 2025-06-28 NOTE — CONSULT NOTE ADULT - ATTENDING COMMENTS
84 female PMH of A-fib on Eliquis, COPD, CKD, aplastic anemia, polymyalgia rheumatica, on chronic steroids, avascular necrosis of hips, HLD, HTN, DM, recent admission to [facility] 5/26 through 6/5/2025 for CHF/fluid overload/COPD exacerbation, presents to the ED form [facility] for evaluation of fever and generalized feeling of being unwell, found to be septic and hypotensive.     Patient is well-known to my practice.  She was recently admitted to [facility] earlier this month for heart failure exacerbation.  Now presenting from rehab with a generalized feeling of unwell and fever.    She has been complaining of right-sided pleuritic chest pain.  Her x-ray shows a focal consolidation in the right lung.  She will need antibiotics.    EKG does not have any signs of acute ischemia.  No significant changes from baseline.  Troponin is negative x 1.    She has a baseline dysautonomia with resultant severe orthostasis.  She is on chronic midodrine therapy.  She is significantly hypotensive in the ER with systolics now down to the 70s despite IV fluid boluses.  I started her on peripheral Levophed.  Increase midodrine to 10 mg every 8.    She has a history of aplastic anemia  Hemoglobin is down to 7.5.  Will need blood transfusion.  ECHO 7/2/25:  LVEF 71%, Normal RV size, thickness, and function, LA moderately dilated, mild MR, Pulmonary artery systolic pressure is 54 mmHg, Mild MA,  Mild TR, Small bilateral pleural effusion, Increased LV mass and concentric hypertrophy  Would hold her diuretic therapy at this time given that she appears dry on exam.  Can continue IV fluids.    She has a history of paroxysmal atrial fibrillation and DVT PE  Can hold her Eliquis for now until hemoglobin stable.  She is maintaining sinus rhythm.  Continue home dose of amiodarone.  Monitor LFTs.    I have spoken to the ICU who has agreed to accept the patient.
Mrs. Pepper is an 84 female with history of A-fib on Eliquis, COPD, CKD, aplastic anemia, polymyalgia rheumatica, on chronic steroids, avascular necrosis of hips, HLD, HTN, DM, recent admission to [facility] 5/26 through 6/5/2025 for CHF/fluid overload/COPD exacerbation, present to the ED form [facility] for evaluation of fever and generalized feeling of being unwell, found to be septic and hypotensive on 6/8 admission, admitted for ICU briefly for vasopressors support and placed on floor. MICU reconsulted on 6/22 for hypoxemia/hypercapnia overnight. Patient appears well, although with some labored breathing. breath sounds are reduced bilaterally, with some crackles on the left. Saturating well on HFNC.   Patient also appears to have moderate to severe pulmonary hypertension.     Agree with above recommendations, edited where needed.
84F with HTN, HLD, COPD, HFpEF, paroxysmal afib on eliquis, DVT/PE s/p IVC filter, COPD, CKD, aplastic anemia requiring pRBC transfusion s/p chest wall port, polymyalgia rheumatica on steroids, AVN of hips, who initially presents with sepsis 2/2 PNA requiring ICU stay, pt has since been transferred to the floor. Course complicated by hemoptysis after reinitiation of anticoagulation, and a/c now discontinued. Pt in ICU for acute on chronic hypercapnic resp failure resolved with AVAVPs. This AM pt again more obtunded with concern for acute on chronic hypercapnic respiratory failure 2/2 NIV nonadherence.     Neuro: Metabolic encephalopathy 2/2 hypercapnic respiratory failure, monitor while on AVAPS. Hold gabapentin  Pulm: Acute on chronic hypercapnic and hypoxic respiratory failure requiring AVAPs, would continue qHS and prn. Pt with hemoptysis earlier in the week, now resolved. CT chest and xray with worsening b/l infiltrates concern for PNA vs hemorrhage though no hemoptysis noted. Continue duonebs and advair  CV: Hypotension likely 2/2 adrenal insufficiency, continue levo to maintain MAP >65. Continue amiodarone for afib but hold a/c given hemoptysis and anemia. Continue stress dose steroids.   Skin/Lines: chest wall port  GI: Regular diet-- NPO except meds when on AVAPs  /Renal: trend I&Os. Continue lasix 40mg daily  Heme/DVT PPX: aplastic anemia requiring frequent transfusion, Monitor off of eliquis given hemoptysis. Trend CBC. SCDs for DVT ppx   Endo: On stress dose steroids; trend FS. ISS.  ID: On zosyn for presumed PNA, to be completed today. pt with hypothermia and hypotension 2/2 likely adrenal insufficiency but would check BCx and UA  Ethics: full code, plan d/w daughter at bedside.

## 2025-06-28 NOTE — PROVIDER CONTACT NOTE (OTHER) - ACTION/TREATMENT ORDERED:
Call RRT
md aware. hyperthermic blanket on. 250 cc bolus given . Hydrocortisone iv push given. x-ray of chest ordered. Care ongoing.
MD made aware; tylenol to be ordered and given. Care ongoing
NP Hussain Orozco aware, no new orders at this time.

## 2025-06-28 NOTE — PROVIDER CONTACT NOTE (CRITICAL VALUE NOTIFICATION) - RECOMMENDATIONS
leave pt on bipap..abg drawn when bipap was just placed
Remain on bipap
increase RR and target VT on avaps

## 2025-06-29 LAB
BASOPHILS # BLD AUTO: 0 K/UL — SIGNIFICANT CHANGE UP (ref 0–0.2)
BASOPHILS NFR BLD AUTO: 0 % — SIGNIFICANT CHANGE UP (ref 0–2)
EOSINOPHIL # BLD AUTO: 0 K/UL — SIGNIFICANT CHANGE UP (ref 0–0.5)
EOSINOPHIL NFR BLD AUTO: 0 % — SIGNIFICANT CHANGE UP (ref 0–6)
HCT VFR BLD CALC: 22.9 % — LOW (ref 34.5–45)
HGB BLD-MCNC: 7.2 G/DL — LOW (ref 11.5–15.5)
IMM GRANULOCYTES # BLD AUTO: 0.02 K/UL — SIGNIFICANT CHANGE UP (ref 0–0.07)
IMM GRANULOCYTES NFR BLD AUTO: 0.8 % — SIGNIFICANT CHANGE UP (ref 0–0.9)
LYMPHOCYTES # BLD AUTO: 0.25 K/UL — LOW (ref 1–3.3)
LYMPHOCYTES NFR BLD AUTO: 9.8 % — LOW (ref 13–44)
MAGNESIUM SERPL-MCNC: 1.6 MG/DL — SIGNIFICANT CHANGE UP (ref 1.6–2.6)
MCHC RBC-ENTMCNC: 30 PG — SIGNIFICANT CHANGE UP (ref 27–34)
MCHC RBC-ENTMCNC: 31.4 G/DL — LOW (ref 32–36)
MCV RBC AUTO: 95.4 FL — SIGNIFICANT CHANGE UP (ref 80–100)
MONOCYTES # BLD AUTO: 0.26 K/UL — SIGNIFICANT CHANGE UP (ref 0–0.9)
MONOCYTES NFR BLD AUTO: 10.2 % — SIGNIFICANT CHANGE UP (ref 2–14)
NEUTROPHILS # BLD AUTO: 2.02 K/UL — SIGNIFICANT CHANGE UP (ref 1.8–7.4)
NEUTROPHILS NFR BLD AUTO: 79.2 % — HIGH (ref 43–77)
NRBC # BLD AUTO: 0 K/UL — SIGNIFICANT CHANGE UP (ref 0–0)
NRBC # FLD: 0 K/UL — SIGNIFICANT CHANGE UP (ref 0–0)
NRBC BLD AUTO-RTO: 0 /100 WBCS — SIGNIFICANT CHANGE UP (ref 0–0)
PLATELET # BLD AUTO: 156 K/UL — SIGNIFICANT CHANGE UP (ref 150–400)
PMV BLD: 12.2 FL — SIGNIFICANT CHANGE UP (ref 7–13)
PROCALCITONIN SERPL-MCNC: 0.23 NG/ML — HIGH
RBC # BLD: 2.4 M/UL — LOW (ref 3.8–5.2)
RBC # FLD: 17.1 % — HIGH (ref 10.3–14.5)
WBC # BLD: 2.55 K/UL — LOW (ref 3.8–10.5)
WBC # FLD AUTO: 2.55 K/UL — LOW (ref 3.8–10.5)

## 2025-06-29 PROCEDURE — 99232 SBSQ HOSP IP/OBS MODERATE 35: CPT

## 2025-06-29 PROCEDURE — 99233 SBSQ HOSP IP/OBS HIGH 50: CPT

## 2025-06-29 PROCEDURE — 93971 EXTREMITY STUDY: CPT | Mod: 26,LT

## 2025-06-29 PROCEDURE — G0545: CPT

## 2025-06-29 PROCEDURE — 99233 SBSQ HOSP IP/OBS HIGH 50: CPT | Mod: GC

## 2025-06-29 PROCEDURE — 99291 CRITICAL CARE FIRST HOUR: CPT

## 2025-06-29 RX ORDER — HYDROCORTISONE 20 MG
50 TABLET ORAL EVERY 8 HOURS
Refills: 0 | Status: DISCONTINUED | OUTPATIENT
Start: 2025-06-29 | End: 2025-06-30

## 2025-06-29 RX ADMIN — Medication 5 MILLIGRAM(S): at 21:08

## 2025-06-29 RX ADMIN — Medication 4 MILLILITER(S): at 19:36

## 2025-06-29 RX ADMIN — IPRATROPIUM BROMIDE AND ALBUTEROL SULFATE 3 MILLILITER(S): .5; 2.5 SOLUTION RESPIRATORY (INHALATION) at 13:51

## 2025-06-29 RX ADMIN — Medication 40 MILLIGRAM(S): at 11:55

## 2025-06-29 RX ADMIN — Medication 20 MILLIEQUIVALENT(S): at 08:55

## 2025-06-29 RX ADMIN — Medication 50 MILLIGRAM(S): at 11:55

## 2025-06-29 RX ADMIN — IPRATROPIUM BROMIDE AND ALBUTEROL SULFATE 3 MILLILITER(S): .5; 2.5 SOLUTION RESPIRATORY (INHALATION) at 19:36

## 2025-06-29 RX ADMIN — Medication 1 DOSE(S): at 21:08

## 2025-06-29 RX ADMIN — IPRATROPIUM BROMIDE AND ALBUTEROL SULFATE 3 MILLILITER(S): .5; 2.5 SOLUTION RESPIRATORY (INHALATION) at 07:45

## 2025-06-29 RX ADMIN — Medication 25 GRAM(S): at 05:05

## 2025-06-29 RX ADMIN — Medication 20 MILLIEQUIVALENT(S): at 11:55

## 2025-06-29 RX ADMIN — Medication 50 MILLIGRAM(S): at 21:08

## 2025-06-29 RX ADMIN — Medication 1 APPLICATION(S): at 05:06

## 2025-06-29 RX ADMIN — FUROSEMIDE 40 MILLIGRAM(S): 10 INJECTION INTRAMUSCULAR; INTRAVENOUS at 05:05

## 2025-06-29 RX ADMIN — Medication 4 MILLILITER(S): at 07:45

## 2025-06-29 RX ADMIN — Medication 50 MILLIGRAM(S): at 05:05

## 2025-06-29 RX ADMIN — Medication 20 MILLIEQUIVALENT(S): at 14:32

## 2025-06-29 RX ADMIN — AMIODARONE HYDROCHLORIDE 100 MILLIGRAM(S): 50 INJECTION, SOLUTION INTRAVENOUS at 05:05

## 2025-06-29 RX ADMIN — Medication 4 MILLILITER(S): at 13:51

## 2025-06-29 NOTE — PROGRESS NOTE ADULT - ASSESSMENT
84F with HTN, HLD, COPD, HFpEF, paroxysmal afib on eliquis, DVT/PE s/p IVC filter, COPD, CKD, aplastic anemia requiring pRBC transfusion s/p chest wall port, polymyalgia rheumatica on steroids, AVN of hips, initially admitted to the ICU 06/08 with septic shock 2/2 RML PNA requiring levophed for hemodynamic support. Since transferred to the floor. Now with recurrent RRTs for acute on chronic hypercapnic respiratory failure with AMS due to NIPPV noncompliance.     Acute on chronic hypercapnic respiratory failure  Metabolic encephalopathy  Hypotension 2/2 adrenal insufficiency     Neuro:   -Metabolic encephalopathy in the setting of acute hypercapnic respiratory failure. Obtunded initially. Now mentating well and answering questions appropriately. Continue to monitor closely.     CV:   -off of pressors this AM. Maintain MAP>65.   - Hypotensive 2/2 adrenal insufficiency. On stress dose steroids.  -History of afib, continue with PO amio.     Pulm:   -Acute hypercapnic respiratory failure 2/2 noncompliance to NIPPV while on the floor. Satting well this AM on 3L NC   -C/w hypersal nebs and bronchodilators.     GI:   -restart diet   -protonix daily     Renal:   -Creatinine improved today, continue to trend  -Monitor I's and O's. Replete electrolytes prn.     ID:   -last dose of Zosyn yesterday for PNA   -Monitor WBC and temps.     Heme:   -AC held given recent hemoptysis. Trend CBCs.   -SCDs for now.     Endo:   -On stress dose steroids.   -ISS. FS. Maintain euglycemia.

## 2025-06-29 NOTE — PROGRESS NOTE ADULT - ASSESSMENT
REASON FOR VISIT  .. Management of problems listed below      EVENTS/CURRENT ISSUES.  . 6/28/2025 tr to icu (had refused avaps last noc)   . 6/26/2025 No further hemoptysis while off apixaba   . 6/24/2025 tr out of icu   . 6/23/2025 tr icu  . 6/22/2025 worsening gas exchange placed on hfnc apixa stoppd   . 6/20/2025 apixa restartd and noc bpap orderde   . 6/16/2025 fob done oozing rul and l lo lobe post basal   . 6/14/2025 iv ufh dced   . 6/13/2025 continues to have mild hemoptysis but is also on iv ufh drip   . 6/13/2025 dw pt re rbaa of bronch and se agrees scheduled for 6/16 10 in or   . 6/13/2025 dw cardio and id   . 6/11/2025 qft funfitell and galactomannan orderd   . 6/11/2025 pt wa sstarted on iv ufh for hemoptysis and pleuritic chest pain but was stopped when vq showed vlp   . 6/10/2025 Mild hemoptysis   . 6/9 tr out of icu  . 6/8/2025  . PNEUMONIA RML 6/8/2025  . SHOCK 6/8/2025   . NOREPI 6/8/2025   . STRESS STEROIDS   .... HYDROCORTISONE 6/8/2025  . A fib (chronic) POA 6/8/2025  . ANEMIA Hb 6/8/2025 Hb 7.5  . KIMBERLY ON CKD Cr 6/8/2025 Cr 1.9        REVIEW OF SYMPTOMS   Able to give ROS  Yes     RELIABILITY +/-   CONSTITUTIONAL Weakness Yes    ENDOCRINE  No heat or cold intolerance    ALLERGY No hives  Sore throat No stridor  RESP Shortness of breath YES   NEURO New weakness No   CARDIAC   Palpitations No         PHYSICAL EXAM    HEENT Unremarkable  atraumatic   RESP Fair air entry  Harsh breath sound   CARDIAC S1 S2 No S3     NO JVD    ABDOMEN No hepatosplenomegaly   PEDAL EDEMA present No calf tenderness    REASON FOR VISIT  .. Management of problems listed below      CC.   . 6/8/2025 brought in by ems for right sided chest pain that started at 3am- nonradiating- patient was offered aspirin by ems- patient refused and stated to ems that she is on plavix and was advised not to take aspirin. patient on 43 litres nasl cannula-     OVERALL PRESENTATION.  . 6/8/2025 84 yr old female with PMHx afib on eliquis, COPD (not on home O2), PE/Rt lower extremity DVT 2017, Rt LE IVC filter 2017 CKD3, aplastic anemia, polymyalgia rheumatica on prednisone 5 mg po qd, requiring PRBC transfusions ~8 weeks (via Rt subclavian mediport), AVN bilat hips, HTN, HLD, HFpEF (75% 6.2.25), diastolic dysfx grade1, recent hospitalization 5/25/25-6/5/25 for ADHF/COPD exacerbation, Pt with eventual improvement and transfer to [facility] facility from where she presents to E.D. this a.m. 6/8/25 with c/o Rt sided chest pain. general malaise. Pt stated began to feel ill evening before presentation, daughter this a.m. endorsed pt lethargic, pale.     In E.D. Pt found to have fever with tmax of 101, KIMBERLY on CKD with sCr 1.90 (baseline 1.2-1.6), HYPOtnsive with SBP 80's (pt on midodrine therapy, usual 's). In addition found to have leukocytosis of 37.6 (baseline 5.25 6/5/25), procalcitonin of 13.6, Hgb 7.5, elevated INR 2.27,  Chest xray demonstrated RML consolidations, concerning for pneum. In E.D. pt receiving 500 cc's NS, Vanco 1 gm, zosyn. Pt received tylenol 1 gm IV x1.       BEST PRACTICE ISSUES.  . HOB ELEVATN.    .... Yes  . DIET  .   .... DASH 6/8/2025   . FREE WATER.   ....   .  IV FLUID.  .... 6/15-6/20/2025 1/2 ns 50   ..... 6/8 lr 40 x 12 h   . PHARMAC DVT PPLX .    .... 6/22 apixa dced suspect hemoptysis   .... 6/20 apixa 2.5 x 2   .... 6/12-6/14/2025 iv ufh (hospitalist)  .... 6/11/2025 hpsc 5k.2   .... 6/10/2025 4:46 PM iv ufh   .... HPSC 6/9-6/10/2025   . NON PHARMAC DVT PPLX .    .... 6/23/2025 compr device   . STRESS ULCR PPLX .   .... PROTONIX 40 6/22/2025   . DATE/DM MGMT.   ..... See under Endocrine section   GENERAL DATA .   . COVID.         .... scv2 6/8/2025 scv2 (-)   . GOC.    ....    . ICU STAY.   .... 6/28/2025   .... 6/22 - 6/25   .... 6/8-6/9   . INFECTION PPLX .   .... CHLORHEXIDINE 2% 6/8/2025   . ALLGY.   ....  nka  . WT.   .... 6/8/2025 69   . BMI.  ....6/8/2025 26      XXXXXXXXXXXXXXXXXXXXXXX  VITALS/GAS EXCHANGE/DRIPS    ABGS.   6/20/2025 4p 5l 736/71/65   6/22/2025 7p bpap 16/8/1 731/82/153   6/23/2025 11p bpap 12/5/.4 727/92/57  6/23/2025 2a avaps 450/18/8/25/10 .45 740/67/85   6/26/2025 5a 3l 742/70/67   6/28/2025 9p avaps 24/475/30/8/30/10 743/61/82  .  VS/ PO/IO/ VENT/ DRIPS.  6/29/2025 afeb 85 120/60   6/29/2025 3l 97%     XXXXXXXX[id number]  PROBLEM ASSESSMENT RECOMMENDATIONS.  RESP.   . GAS EXCHANGE .   .... target PO 90-95%     . NEED FOR HOME OXYGEN   .... 6/18/2025 will need home oxygen if at discharge pulse ox at rest or on exertion is below 88%     . COPD   .... DUONEB.3 6/25   .... ADVAIR 500 6/8/2025   .... MONTELUKAST 10 6/8/2025   .... SPIRIVA 6/8/2025   .... nacl 3% bid 6/22/2025   .... HYDROCORT   ........ 50.2 6/24/2025  ....... 25.2 6/25/2025      . RESP FAILURE  .... 6/8/2025 Has ho hypercapnia   .... 6/8/2025 recommend monitor abg  .... 6/18/2025 requiring 4l nc     . HYPERCAPNIC RESP FAILURE WITHOUT ACIDEMIA   6/20/2025 4p 5l 736/71/65   .... 6/20/2025 sterted noct bpap 35/15/6/16  .... 6/23 noct avaps .45 14 epap 8 vt 450 25/10     . MILD HEMOPTYSIS 6/10/2025   .... ct ch 6/10/2025 cw 5/31  ........ new mf infection in rul   ........ unchanged small r greater than left pl effsn   ....... enlarged pulm artery suggesting pulm hytn   .... 6/10/2025  dw hemat cardio id and instead of restarting apixa will start iv UFH which can be easily reversed in case Hb starts dropping but will be the rx for venous thromboembolism as well as for a fib   .... v duplx 6/10 (-)  .... vq 6/10 vlp   .... 6/13/2025 continues to have mild hemoptysis but is also on iv ufh drip   . 6/13/2025 dw pt re rbaa of bronch and se agrees scheduled for 6/16 10 in or   .... 6/13/2025 dw cardio and id   .... 6/16 fob done showed ooze rul and l lo lobe no eb lesions   .... 6/17/2025 continues to have mild hemoptysis   .... 6/17/2025 30% of hemoptysis cases no cause is found  Ordered gbm ab rf dsdna rf apla chapman   .... 6/18/2025 cbmab dsdna inderjit apla ab all (-)   .... 6/12 fungitell galactomannan (-)   .... strep legn ag 6/9 (-)   .... fob 6/16 afb sm (-) cyto (-)  .... 6/18/2025 hemoptysis likely sec to pneumonia in setting of pulm hypertension   .... 6/20/2025 apixa restarted   .... 6/22/2025 apixa dced    . PLEURITIC CHEST PAIN RO VTE   .... v duplx 6/10 (-)  .... vq 6/10 vlp   .... 6/12/2025 iv ufh was stopped 6/11 as vq vlp but was restarted by hospitalist 6/12/2025 for af   .... chest pain resolvd       INFECTION.  . DATA  .... w 6/22-6/23-6/24-6/26-6/28/2025   .... w 7.2 - 7.8 - 4.6 - 4.8 - 9.7   .... w 6/8-6/10-6/13-6/14-6/15-6/17-6/18-6/19-6/20/2025   .... w 13.6 - 9.2 - 15.9 - 18- 14 - 9.6 - 11.9- 9.8 - 8.7   .... esr 6/8/2025 esr 17   .... w 6/8-6/9/2025 w 32 - 24   .... ua 6/8/2025 w 4   .... cxr 6/8/2025 cxr dense infiltra r upper hilum r mediport  .... ct ch 6/10 cw 5/31  ........ r greater than l effs   ........ partial rll atelectasis   ........ new patchy and nodular areas of consolidation rul and associated ground glass opacities   ........ ground glass and nodular opac also scott   ........ numerous tib rml and rll     . MICROBIO  .... sp 6/8 n commensals   .... flu ab 6/8/2025 (-)   .... rsv 6/8/2025 (-)    .... mrsa 6/9/2025 (-)  .... uc 6/8 (-)  .... bc 6/8 (-)     . PNEUMONIA RUL 6/8/2025   .... AZITHRO 6/8-6/11 /2025   .... ZOSYN 6/8-6/15/2025     . PNEUMONIA   .... ct ch 6/22/2025 cw 6/10/2025   ....... incr consolidation and ggo rul   ....... new large consolidatn scott and l lo lobe   .... 6/22/2025 zosyn     CARDIAC.  . PAIN CHEST   .... 6/10/2025 co pain chest r   .... 6/10/2025 check ct to see if she has pleurisy   .... 6/10/2025  dw hemat cardio id and instead of restarting apixa will start iv UFH which can be easily reversed in case Hb starts dropping but will be the rx for venous thromboembolism as well as for a fib     . STRESS STEROIDS   .... HYDROCORTISONE   ........ 6/8/2025 h 50.4  ........ 6/10/2025 h 50.2   ........ 6/12/2025 hydrocort 50   ....... 6/14/2025 h 25  ........ 6/17/2025 dced     . SHOCK   .... MIDODRINE 6/8-6/16/2025 m 10.3   .... DROXIDOPA 6/9-6/16/2025 d 100.3   .... NOREPI 6/8-6/9/2025 n 8/250   .... target map 65 (+)     . CAD    .... Trop 6/8-6/9/2025 Tr 32- 24   .... metoprolol 25.2 6/27/2025     . LACTICEMIA   .... la 6/8/2025 la 1.4     . CHF  .... pbnp 6/8/2025 pbnp 6599   .... tte 4/2025 mild ph  n vf  .... tte 6/2/2025   ........ ef 71%   ........ n rvsf    ........ pasp 54   ....... la mod dilatd   .... lasix 40/d 6/22/2025  .... torsemide 20 6/17-6/22/2025  .... lasix 40 once 6/14/2025  .... 6/22/2025 lasix 20 once     . A fib (chronic) POA 6/8/2025  .... AMIODARONE 100 6/8/2025  .... 6/10/2025  dw hemat cardio id and instead of restarting apixa will start iv UFH which can be easily reversed in case Hb starts dropping but will be the rx for venous thromboembolism as well as for a fib   .... 6/12-6/14/2025 iv ufh     HEMAT.  . DATA  .... Plt 6/8/2025 plt 153   .... inr 6/8-6/9/2025 inr 2.27- 1.63      . ANEMIA   .... Hb 6/26-8/27-6/28-6/29/2025   .... Hb 8.3 - 8.6 - 8.7 - 7.2   .... Hb 6/19-6/20-6/21-6/22-6/23-6/24/2025   .... Hb 7.8 - 9.2 - 9.3 - 8.3 - 9.2 - 8.4   .... Hb 6/8-6/9-6/10-6/11-6/13-6/14-6/15-6/17-6/18/2025   .... Hb 7.5- 7.8 - 7.6- 7.1 - 8.4 - 7.3- 9.1 - 8.1 - 8.5    . TRANSFUSION  .... 6/8  1 u prbc    .... 6/14 2 u prbc   .... 6/22/2025 1 u prbc     GI.   . DIET .   .... dash 6/8/2025     . LFT MONITORING   .... LFTS    6/8/2025  ........ AP     39  ........ AST   11  ........ ALT    35    RENAL.  . DATA  .... Na 6/8/2025 Na 137  .... K 6/8/2025 K 4   .... CO2 6/8/2025 co2 29   .... ag 6/8/2025 ag 9  .... alb 6/8/2025 alb 3.4     . KIMBERLY ON CKD   .... Cr 6/8-6/9-6/10-6/11-6/13-6/14/2025   .... Cr 1.9 - 1.4 - 1.3 - 1.3 - 1.1 - 1  .... Cr 5/27-5/28-5/29-5/30-6/1/2025   .... Cr 1.2 - 1.3 - 1.3 - 1.6 - 1.6    . KIMBERLY ON CKD  .... Cr 6/28/2025 Cr 1.4     . HYPERNATREMIA   .... Na 6/24-6/26/2025 Na 147    ENDO.  . DM  .  .... 6/8/2025 SL SCALE     NEURO.  . PAIN  .... GABAPENTIN 6/8/2025 g 400.2         [id number]   SUMMARY BASELINE .     84 yr old female pt of Dr Gallegos not on home ox or home cpap used to use trelegy lives with spouse quit 40 y 1/2 ppd with PMHx afib on eliquis, COPD (not on home O2), PE/Rt lower extremity DVT 2017, Rt LE IVC filter 2017 CKD3, aplastic anemia, polymyalgia rheumatica on prednisone 5 mg po qd, requiring PRBC transfusions around 8 weeks (via Rt subclavian mediport), AVN bilat hips, HTN, HLD, HFpEF (75% 6.2.25), diastolic dysfx grade1, recent hospitalization 5/25/25-6/5/25 for ADHF/COPD exacerbation, Pt with eventual improvement and transfer to [facility] facility   CC.   . 6/8/2025 R CHEST PAIN   MAIN ISSUES.  . COPD 6/23/2025 hydrocort 50.3 6/27/2025 pred 10   . HYPERCAPNIC RESP FAILURE: 6/20/2025 4p 5l 736/71/65 6/23/2025 2a avaps 450/18/8/25/10 .45 740/67/85   .... 6/20/2025 Noct bpap 15/5/35/16 ordrd 6/23 noct avaps .45 14 epap 8 vt 450 25/10   . RESP FAILURE 6/22/2025  HFNC 6/22 5p HFNC 80% 50l po 95% tr icu   . MILD HEMOPTYSOIS 6/10/2025 6/22 apixa stoppd   . PNEUMONIA RML 6/8/2025:  ZOSYN 6/8-6/16/2025   . PLEURITIS CHEST PAIN 6/10/2025: 6/10 vte excluded vlp vq   . SHOCK 6/8/2025: tr oo icu 6/9   . NOREPI 6/8-6/9/2025   . STRESS STEROIDS : HYDROCORTISONE 6/8-6/17/2025  . A fib (chronic) POA 6/8/20256/22/2025 6/22 apixa stopped   . CHF: tte 6/2/2025  ef 71%  pasp 54  la mod dilatd 6/22 lasix 40   . ANEMIA Hb 6/8-6/13-6/25/2025 Hb 7.5- 8.4- 8.5  . TRANSFUSION 6/8  1 u prbc  6/22 1 u prbc   . KIMBERLY ON CKD Cr 6/8-6/13/2025 Cr 1.9- 1.1  . HYPERNATREMIA 6/24-6/25/2025 Na 147-146     . PNEUMONIA: 6/22 ct bl ul opac 6/22-6/29/2025 zosyn   . SUSPECTED BLEEDING IN [place] CAUSED RESP DECOMPENSATION AND NEW OPACITIES ON 6/22 CT  6/22 622 apixa stoppd     . RESP FAILURE 6/28/2025 LIKELY DUE TO NONCOMPLIANCE WITH NOCT AVAP Tr to ICU 6/28/2025 AVAPS 30% 24   . COPD HYDROCORT 6/29/2025 h 50.3   . KIMBERLY ON CKD 6/28/2025 Cr 1.4    . HYPOPHOSPHATEMIA 6/28/2025 PO4 .7     PMH.   . AFon Eliquis,   . COPD (not on home o2),   . CKD,   . aplastic anemia,   . TRANSFUSION 6/2/2025 1 u prbc   . PMR (chronic prednisone with AVN hip),   . HLD,   . HTN,   . DM    PROCEDURES/DEVICES .  . 6/16/2025 FOB br wash rul ooze rul l lo lobe post basal     PAST PROCEDURES   . r mediport poa 6/8/2025     DISCUSSIONS.  .... Discussed with primary care and relevant consultants on an ongoing basis       TIME SPENT.  . Over 36 minutes aggregate care time spent on encounter; activities included   direct patient care, counseling and/or coordinating care reviewing notes, lab data/ imaging , discussion with multidisciplinary team/ patient  /family and explaining in detail risks, benefits, alternatives  of the recommendations     ROBERT HENRIQUEZ 84 6/8/2025 1942

## 2025-06-29 NOTE — PROGRESS NOTE ADULT - SUBJECTIVE AND OBJECTIVE BOX
CHIEF COMPLAINT/ REASON FOR VISIT  .. Patient was seen to address the  issue listed under PROBLEM LIST which is located toward bottom of this note     [facility] ICU1 13A    Allergies    No Known Allergies    Intolerances        PAST MEDICAL & SURGICAL HISTORY:  COPD (chronic obstructive pulmonary disease)      DM (diabetes mellitus)  diet-controlled      PAF (paroxysmal atrial fibrillation)  s/p ablation      Hiatal hernia      H/O aplastic anemia      History of IBS      H/O osteoporosis      HLD (hyperlipidemia)      PMR (polymyalgia rheumatica)      History of basal cell cancer      Chronic kidney disease (CKD)      Hypotension      Presence of IVC filter      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      History of immunodeficiency      Lumbar compression fracture      H/O hiatal hernia      H/O pulmonary fibrosis      H/O sinus bradycardia      History of fracture of right hip  "unoperable"      S/P hysterectomy      S/P foot surgery      S/P knee surgery      After cataract  bilateral eye cataract surgically removed      Closed right hip fracture  rigth hip ORIF 2016      S/P IVC filter  2016      S/P carpal tunnel release  Right 2008 & 17      H/O kyphoplasty      Port-A-Cath in place  right chest          FAMILY HISTORY:  Family history of bladder cancer (Mother)    Family history of myocardial infarction (Father)    FH: atrial fibrillation (Father)        Home Medications:  amiodarone 200 mg oral tablet: 0.5 tab(s) orally once a day (2025 16:43)  Bentyl 10 mg oral capsule: 1 cap(s) orally 2 times a day, As Needed (2025 16:43)  Eliquis 2.5 mg oral tablet: 1 tab(s) orally 2 times a day (2025 16:43)  fluticasone-salmeterol 500 mcg-50 mcg/inh inhalation powder: 1 puff(s) inhaled 2 times a day (2025 16:43)  folic acid 1 mg oral tablet: 1 tab(s) orally once a day (in the morning) (2025 16:43)  gabapentin 400 mg oral capsule: 1 cap(s) orally 2 times a day (2025 16:43)  ipratropium-albuterol 0.5 mg-2.5 mg/3 mL inhalation solution: 3 milliliter(s) inhaled every 6 hours As needed Shortness of Breath and/or Wheezing (2025 16:43)  IVIG monthly:  (2025 16:43)  leflunomide 10 mg oral tablet: 1 tab(s) orally once a day (2025 16:43)  midodrine 10 mg oral tablet: 1 tab(s) orally every 8 hours (:43)  montelukast 10 mg oral tablet: 1 tab(s) orally once a day (2025 16:43)  pantoprazole 40 mg oral delayed release tablet: 1 tab(s) orally once a day (before a meal) (2025 16:43)  predniSONE 5 mg oral tablet: 1 tab(s) orally once a day (:43)  Procrit 10,000 units/mL preservative-free injectable solution: injectable once a week WEDNESDAY (2025 16:43)  Reclast Infusion , IV x 1 yearly:  (:43)  simvastatin 10 mg oral tablet: 1 tab(s) orally once a day (at bedtime) (:43)  tiotropium 2.5 mcg/inh inhalation aerosol: 2 puff(s) inhaled once a day (2025 16:43)  tiZANidine: 2 tab(s) orally once a day (at bedtime) as needed for  muscle spasm (2025 16:43)  torsemide 20 mg oral tablet: 1 tab(s) orally once a day (in the morning) (2025 16:49)      MEDICATIONS  (STANDING):  albuterol/ipratropium for Nebulization 3 milliLiter(s) Nebulizer three times a day  aMIOdarone    Tablet 100 milliGRAM(s) Oral daily  chlorhexidine 2% Cloths 1 Application(s) Topical <User Schedule>  dextrose 5%. 1000 milliLiter(s) (50 mL/Hr) IV Continuous <Continuous>  dextrose 5%. 1000 milliLiter(s) (100 mL/Hr) IV Continuous <Continuous>  dextrose 50% Injectable 12.5 Gram(s) IV Push once  dextrose 50% Injectable 25 Gram(s) IV Push once  dextrose 50% Injectable 25 Gram(s) IV Push once  fluticasone propionate/ salmeterol 500-50 MICROgram(s) Diskus 1 Dose(s) Inhalation two times a day  furosemide   Injectable 40 milliGRAM(s) IV Push daily  glucagon  Injectable 1 milliGRAM(s) IntraMuscular once  hydrocortisone sodium succinate Injectable 50 milliGRAM(s) IV Push every 6 hours  insulin lispro (ADMELOG) corrective regimen sliding scale   SubCutaneous three times a day before meals  melatonin 5 milliGRAM(s) Oral at bedtime  norepinephrine Infusion 0.05 MICROgram(s)/kG/Min (6.74 mL/Hr) IV Continuous <Continuous>  pantoprazole  Injectable 40 milliGRAM(s) IV Push daily  sodium chloride 3%  Inhalation 4 milliLiter(s) Inhalation three times a day    MEDICATIONS  (PRN):  acetaminophen     Tablet .. 650 milliGRAM(s) Oral every 6 hours PRN Mild Pain (1 - 3)  dextrose Oral Gel 15 Gram(s) Oral once PRN Blood Glucose LESS THAN 70 milliGRAM(s)/deciliter      Diet, Regular:   DASH/TLC Sodium & Cholesterol Restricted (25 @ 13:52) [Active]          Vital Signs Last 24 Hrs  T(C): 37 (2025 03:58), Max: 37.3 (2025 17:08)  T(F): 98.6 (2025 03:58), Max: 99.2 (2025 20:24)  HR: 72 (2025 06:30) (54 - 92)  BP: 119/48 (2025 06:30) (70/50 - 159/68)  BP(mean): 69 (2025 06:30) (50 - 92)  RR: 16 (2025 06:30) (5 - 25)  SpO2: 98% (2025 06:30) (90% - 100%)    Parameters below as of 2025 06:00  Patient On (Oxygen Delivery Method): BiPAP/CPAP    O2 Concentration (%): 30      25 @ 07:01  -  25 @ 07:00  --------------------------------------------------------  IN: 150 mL / OUT: 340 mL / NET: -190 mL    25 @ 07:01  -  25 @ 06:52  --------------------------------------------------------  IN: 898.4 mL / OUT: 500 mL / NET: 398.4 mL              LABS:                        8.7    9.71  )-----------( Clumped    ( 2025 06:20 )             29.3         138  |  101  |  38[H]  ----------------------------<  142[H]  4.1   |  32[H]  |  1.40[H]    Ca    8.4[L]      2025 06:20  Phos  0.7       Mg     1.7         TPro  6.1  /  Alb  2.6[L]  /  TBili  0.4  /  DBili  x   /  AST  20  /  ALT  18  /  AlkPhos  63        Urinalysis Basic - ( 2025 16:20 )    Color: Yellow / Appearance: Clear / S.013 / pH: x  Gluc: x / Ketone: x  / Bili: Negative / Urobili: 0.2 mg/dL   Blood: x / Protein: Trace mg/dL / Nitrite: Negative   Leuk Esterase: Moderate / RBC: 3 /HPF / WBC 10 /HPF   Sq Epi: x / Non Sq Epi: x / Bacteria: Occasional /HPF        ABG - ( 2025 21:40 )  pH, Arterial: 7.43  pH, Blood: x     /  pCO2: 61    /  pO2: 82    / HCO3: 40    / Base Excess: 16.2  /  SaO2: 98.5                WBC:  WBC Count: 9.71 K/uL ( @ 06:20)  WBC Count: 6.70 K/uL ( @ 08:20)  WBC Count: 4.89 K/uL ( @ 05:45)      MICROBIOLOGY:  RECENT CULTURES:   Sputum Sputum XXXX   Few polymorphonuclear leukocytes per low power field  Few Squamous epithelial cells per low power field  No organisms seen per oil power field   Commensal marilee consistent with body site                    Sodium:  Sodium: 138 mmol/L ( @ 06:20)  Sodium: 143 mmol/L ( @ 08:20)  Sodium: 146 mmol/L ( @ 05:45)      1.40 mg/dL  @ 06:20  1.20 mg/dL  08:20  0.95 mg/dL  @ 05:45      Hemoglobin:  Hemoglobin: 8.7 g/dL ( @ 06:20)  Hemoglobin: 8.6 g/dL ( @ 08:20)  Hemoglobin: 8.3 g/dL ( @ 05:45)      Platelets: Platelet Count - Automated: Clumped K/uL ( @ 06:20)  Platelet Count - Automated: 178 K/uL ( @ 08:20)  Platelet Count - Automated: 176 K/uL ( @ 05:45)      LIVER FUNCTIONS - ( 2025 06:20 )  Alb: 2.6 g/dL / Pro: 6.1 g/dL / ALK PHOS: 63 U/L / ALT: 18 U/L / AST: 20 U/L / GGT: x             Urinalysis Basic - ( 2025 16:20 )    Color: Yellow / Appearance: Clear / S.013 / pH: x  Gluc: x / Ketone: x  / Bili: Negative / Urobili: 0.2 mg/dL   Blood: x / Protein: Trace mg/dL / Nitrite: Negative   Leuk Esterase: Moderate / RBC: 3 /HPF / WBC 10 /HPF   Sq Epi: x / Non Sq Epi: x / Bacteria: Occasional /HPF        RADIOLOGY & ADDITIONAL STUDIES:      MICROBIOLOGY:  RECENT CULTURES:   Sputum Sputum XXXX   Few polymorphonuclear leukocytes per low power field  Few Squamous epithelial cells per low power field  No organisms seen per oil power field   Commensal marilee consistent with body site

## 2025-06-29 NOTE — PROGRESS NOTE ADULT - ASSESSMENT
84F h/o AFib on Eliquis, COPD, CKD, aplastic anemia, polymyalgia rheumatica on chronic steroids, avascular necrosis of hips, HTN, HLD, with recent admission to [place] 5/26 - 6/5 for acute HFpEF exacerbation and COPD exacerbation, discharged to rehab on 6/5 and returning today with right sided chest pain, general malaise and feeling unwell. She reports some slight cough though otherwise no other new symptoms reported. Found to be hypotensive, febrile w/ leukocytosis. Admitted to ICU for septic shock likely 2/2 to PNA and KIMBERLY. downgraded to tele. s/p RRT 6/28 for hypothermia, hypotension and lethargy    Metabolic encephalopathy   acute on chronic hypercapnic respiratory failure    - pulm following    - on AVAPs.     - wean to keep pulse ox >88%     - cont advair, duoneb, hypertonic saline QID    shock secondary to adrenal insufficiency     - on stress dosed steroids     PNA/hemoptysis     - s/p bronch with blood in the RUL. AC held.     - weaned off droxidopa/midodrine. Weaning steroids as tolerated.     - completed zosyn 6/29.     - Pulm/ID following     Aplastic anemia    - eliquis held. patient had recurrent hemoptysis with evidence of bleeding on bronch. s/p prbc on 6/23.     KIMBERLY/CKD3, prerenal azotemia     - nephrology following     - potassium supplemented     afib     - cardio following.     - cont amiodarone 100mg daily     - BB and diuretics held     PMR    - on chronic prednisone 5mg daily at home.     HLD    - cont statin     DVT proph: SCDs given hemoptysis  management per ICU

## 2025-06-29 NOTE — PROGRESS NOTE ADULT - SUBJECTIVE AND OBJECTIVE BOX
Patient is a 84y old  Female who presents with a chief complaint of septic shock, PNA (29 Jun 2025 06:51)    INTERVAL HPI/OVERNIGHT EVENTS: Patient was seen and examined. No acute events occurred overnight. No new complaints.    I&O's Summary    28 Jun 2025 07:01  -  29 Jun 2025 07:00  --------------------------------------------------------  IN: 898.4 mL / OUT: 500 mL / NET: 398.4 mL    29 Jun 2025 07:01  -  29 Jun 2025 11:15  --------------------------------------------------------  IN: 25 mL / OUT: 600 mL / NET: -575 mL        LABS:                        7.2    2.55  )-----------( Clumped    ( 29 Jun 2025 06:25 )             22.9     06-29    143  |  99  |  41[H]  ----------------------------<  135[H]  3.1[L]   |  39[H]  |  1.20    Ca    8.0[L]      29 Jun 2025 06:25  Phos  4.4     06-29  Mg     1.6     06-29    TPro  5.6[L]  /  Alb  2.6[L]  /  TBili  0.4  /  DBili  x   /  AST  7[L]  /  ALT  17  /  AlkPhos  57  06-29      Urinalysis Basic - ( 29 Jun 2025 06:25 )    Color: x / Appearance: x / SG: x / pH: x  Gluc: 135 mg/dL / Ketone: x  / Bili: x / Urobili: x   Blood: x / Protein: x / Nitrite: x   Leuk Esterase: x / RBC: x / WBC x   Sq Epi: x / Non Sq Epi: x / Bacteria: x      CAPILLARY BLOOD GLUCOSE      POCT Blood Glucose.: 129 mg/dL (29 Jun 2025 08:10)  POCT Blood Glucose.: 138 mg/dL (28 Jun 2025 23:18)  POCT Blood Glucose.: 153 mg/dL (28 Jun 2025 17:00)  POCT Blood Glucose.: 126 mg/dL (28 Jun 2025 11:16)    ABG - ( 28 Jun 2025 21:40 )  pH, Arterial: 7.43  pH, Blood: x     /  pCO2: 61    /  pO2: 82    / HCO3: 40    / Base Excess: 16.2  /  SaO2: 98.5                Urinalysis Basic - ( 29 Jun 2025 06:25 )    Color: x / Appearance: x / SG: x / pH: x  Gluc: 135 mg/dL / Ketone: x  / Bili: x / Urobili: x   Blood: x / Protein: x / Nitrite: x   Leuk Esterase: x / RBC: x / WBC x   Sq Epi: x / Non Sq Epi: x / Bacteria: x        MEDICATIONS  (STANDING):  albuterol/ipratropium for Nebulization 3 milliLiter(s) Nebulizer three times a day  aMIOdarone    Tablet 100 milliGRAM(s) Oral daily  chlorhexidine 2% Cloths 1 Application(s) Topical <User Schedule>  dextrose 5%. 1000 milliLiter(s) (50 mL/Hr) IV Continuous <Continuous>  dextrose 5%. 1000 milliLiter(s) (100 mL/Hr) IV Continuous <Continuous>  dextrose 50% Injectable 12.5 Gram(s) IV Push once  dextrose 50% Injectable 25 Gram(s) IV Push once  dextrose 50% Injectable 25 Gram(s) IV Push once  fluticasone propionate/ salmeterol 500-50 MICROgram(s) Diskus 1 Dose(s) Inhalation two times a day  furosemide   Injectable 40 milliGRAM(s) IV Push daily  glucagon  Injectable 1 milliGRAM(s) IntraMuscular once  hydrocortisone sodium succinate Injectable 50 milliGRAM(s) IV Push every 6 hours  melatonin 5 milliGRAM(s) Oral at bedtime  pantoprazole  Injectable 40 milliGRAM(s) IV Push daily  potassium chloride    Tablet ER 20 milliEquivalent(s) Oral every 2 hours  sodium chloride 3%  Inhalation 4 milliLiter(s) Inhalation three times a day    MEDICATIONS  (PRN):  acetaminophen     Tablet .. 650 milliGRAM(s) Oral every 6 hours PRN Mild Pain (1 - 3)  dextrose Oral Gel 15 Gram(s) Oral once PRN Blood Glucose LESS THAN 70 milliGRAM(s)/deciliter      REVIEW OF SYSTEMS:  CONSTITUTIONAL: No fever or chills  HEENT:  No headache, no sore throat  RESPIRATORY: No cough, wheezing, or shortness of breath  CARDIOVASCULAR: No chest pain, palpitations  GASTROINTESTINAL: No abdominal pain, nausea, vomiting, or diarrhea  GENITOURINARY: No dysuria, frequency, or hematuria  NEUROLOGICAL: no focal weakness or dizziness  MUSCULOSKELETAL: no myalgias  PSYCH: no recent changes in mood    RADIOLOGY & ADDITIONAL TESTS:    Imaging Personally Reviewed:  [x] YES  [ ] NO    Consultant(s) Notes Reviewed:  [x] YES  [ ] NO    PHYSICAL EXAM:  T(C): 36.9 (06-29-25 @ 07:48), Max: 37.3 (06-28-25 @ 17:08)  HR: 93 (06-29-25 @ 10:00) (56 - 101)  BP: 130/95 (06-29-25 @ 10:00) (84/41 - 159/68)  RR: 17 (06-29-25 @ 10:00) (5 - 25)  SpO2: 97% (06-29-25 @ 10:00) (90% - 100%)    GENERAL: NAD, well-developed, well-groomed  HEENT:  anicteric, moist mucous membranes  CHEST/LUNG:  CTA b/l, no rales, wheezes, or rhonchi  HEART:  RRR, S1, S2  ABDOMEN:  BS+, soft, nontender, nondistended  EXTREMITIES: no edema, cyanosis, or calf tenderness  NERVOUS SYSTEM: answers questions and follows commands appropriately  PSYCH: normal affect    Care Discussed with Consultants/Other Providers [x] YES  [ ] NO Patient is a 84y old  Female who presents with a chief complaint of septic shock, PNA (29 Jun 2025 06:51)    INTERVAL HPI/OVERNIGHT EVENTS: Patient was seen and examined. taken off bipap. more awake and answering questions.      I&O's Summary    28 Jun 2025 07:01  -  29 Jun 2025 07:00  --------------------------------------------------------  IN: 898.4 mL / OUT: 500 mL / NET: 398.4 mL    29 Jun 2025 07:01  -  29 Jun 2025 11:15  --------------------------------------------------------  IN: 25 mL / OUT: 600 mL / NET: -575 mL        LABS:                        7.2    2.55  )-----------( Clumped    ( 29 Jun 2025 06:25 )             22.9     06-29    143  |  99  |  41[H]  ----------------------------<  135[H]  3.1[L]   |  39[H]  |  1.20    Ca    8.0[L]      29 Jun 2025 06:25  Phos  4.4     06-29  Mg     1.6     06-29    TPro  5.6[L]  /  Alb  2.6[L]  /  TBili  0.4  /  DBili  x   /  AST  7[L]  /  ALT  17  /  AlkPhos  57  06-29      Urinalysis Basic - ( 29 Jun 2025 06:25 )    Color: x / Appearance: x / SG: x / pH: x  Gluc: 135 mg/dL / Ketone: x  / Bili: x / Urobili: x   Blood: x / Protein: x / Nitrite: x   Leuk Esterase: x / RBC: x / WBC x   Sq Epi: x / Non Sq Epi: x / Bacteria: x      CAPILLARY BLOOD GLUCOSE      POCT Blood Glucose.: 129 mg/dL (29 Jun 2025 08:10)  POCT Blood Glucose.: 138 mg/dL (28 Jun 2025 23:18)  POCT Blood Glucose.: 153 mg/dL (28 Jun 2025 17:00)  POCT Blood Glucose.: 126 mg/dL (28 Jun 2025 11:16)    ABG - ( 28 Jun 2025 21:40 )  pH, Arterial: 7.43  pH, Blood: x     /  pCO2: 61    /  pO2: 82    / HCO3: 40    / Base Excess: 16.2  /  SaO2: 98.5        Urinalysis Basic - ( 29 Jun 2025 06:25 )    Color: x / Appearance: x / SG: x / pH: x  Gluc: 135 mg/dL / Ketone: x  / Bili: x / Urobili: x   Blood: x / Protein: x / Nitrite: x   Leuk Esterase: x / RBC: x / WBC x   Sq Epi: x / Non Sq Epi: x / Bacteria: x        MEDICATIONS  (STANDING):  albuterol/ipratropium for Nebulization 3 milliLiter(s) Nebulizer three times a day  aMIOdarone    Tablet 100 milliGRAM(s) Oral daily  chlorhexidine 2% Cloths 1 Application(s) Topical <User Schedule>  dextrose 5%. 1000 milliLiter(s) (50 mL/Hr) IV Continuous <Continuous>  dextrose 5%. 1000 milliLiter(s) (100 mL/Hr) IV Continuous <Continuous>  dextrose 50% Injectable 12.5 Gram(s) IV Push once  dextrose 50% Injectable 25 Gram(s) IV Push once  dextrose 50% Injectable 25 Gram(s) IV Push once  fluticasone propionate/ salmeterol 500-50 MICROgram(s) Diskus 1 Dose(s) Inhalation two times a day  furosemide   Injectable 40 milliGRAM(s) IV Push daily  glucagon  Injectable 1 milliGRAM(s) IntraMuscular once  hydrocortisone sodium succinate Injectable 50 milliGRAM(s) IV Push every 6 hours  melatonin 5 milliGRAM(s) Oral at bedtime  pantoprazole  Injectable 40 milliGRAM(s) IV Push daily  potassium chloride    Tablet ER 20 milliEquivalent(s) Oral every 2 hours  sodium chloride 3%  Inhalation 4 milliLiter(s) Inhalation three times a day    MEDICATIONS  (PRN):  acetaminophen     Tablet .. 650 milliGRAM(s) Oral every 6 hours PRN Mild Pain (1 - 3)  dextrose Oral Gel 15 Gram(s) Oral once PRN Blood Glucose LESS THAN 70 milliGRAM(s)/deciliter      REVIEW OF SYSTEMS:  CONSTITUTIONAL: No fever or chills  HEENT:  No headache  RESPIRATORY: No cough or shortness of breath  CARDIOVASCULAR: No chest pain  GASTROINTESTINAL: No abdominal pain, nausea, vomiting, or diarrhea  GENITOURINARY: No dysuria  NEUROLOGICAL: no focal weakness   MUSCULOSKELETAL: no myalgias  PSYCH: no recent changes in mood    RADIOLOGY & ADDITIONAL TESTS:    Imaging Personally Reviewed:  [x] YES  [ ] NO    Consultant(s) Notes Reviewed:  [x] YES  [ ] NO    PHYSICAL EXAM:  T(C): 36.9 (06-29-25 @ 07:48), Max: 37.3 (06-28-25 @ 17:08)  HR: 93 (06-29-25 @ 10:00) (56 - 101)  BP: 130/95 (06-29-25 @ 10:00) (84/41 - 159/68)  RR: 17 (06-29-25 @ 10:00) (5 - 25)  SpO2: 97% (06-29-25 @ 10:00) (90% - 100%)    GENERAL: awake, oriented   HEENT:  anicteric, moist mucous membranes  CHEST/LUNG:  CTA b/l, no rales, wheezes, or rhonchi  HEART:  RRR, S1, S2  ABDOMEN:  BS+, soft, nontender, nondistended  EXTREMITIES: + edema  NERVOUS SYSTEM: lethargic     Care Discussed with Consultants/Other Providers [x] YES  [ ] NO

## 2025-06-29 NOTE — PROGRESS NOTE ADULT - ATTENDING COMMENTS
84F with HTN, HLD, COPD, HFpEF, paroxysmal afib on eliquis, DVT/PE s/p IVC filter, COPD, CKD, aplastic anemia requiring pRBC transfusion s/p chest wall port, polymyalgia rheumatica on steroids, AVN of hips, who initially presents with sepsis 2/2 PNA requiring ICU stay, pt has since been transferred to the floor. Course complicated by hemoptysis after reinitiation of anticoagulation, and a/c now discontinued. Pt now with multiple episodes of hypercapnic respiratory failure 2/2 nocturnal NIV nonadherence, now resolved    Neuro: Metabolic encephalopathy 2/2 hypercapnic respiratory failure, monitor while on AVAPS. Hold gabapentin  Pulm: Acute on chronic hypercapnic and hypoxic respiratory failure requiring AVAPs, would continue qHS and prn. Pt with hemoptysis earlier in the week, now resolved. CT chest and xray with worsening b/l infiltrates concern for PNA vs hemorrhage though no hemoptysis noted. Continue duonebs and advair  CV: Hypotension likely 2/2 adrenal insufficiency now resolved. Continue amiodarone for afib but hold a/c given hemoptysis and anemia. Continue stress dose steroids.   Skin/Lines: chest wall port  GI: Regular diet-- NPO except meds when on AVAPs  /Renal: trend I&Os. Continue lasix 40mg daily  Heme/DVT PPX: aplastic anemia requiring frequent transfusion, transfuse 1U today. Monitor off of eliquis given hemoptysis. Trend CBC. SCDs for DVT ppx   Endo: On stress dose steroids, taper as tolerated. Will likely need prolonged taper; trend FS. ISS.  ID: s/p [id number] for presumed PNA. pt with hypothermia and hypotension now resolved. Procal neg. no reason to send BCx  Ethics: full code    pt stable for transfer to floors

## 2025-06-29 NOTE — PROGRESS NOTE ADULT - ASSESSMENT
84 female PMH of A-fib on Eliquis, COPD, CKD, aplastic anemia, polymyalgia rheumatica, on chronic steroids, avascular necrosis of hips, HLD, HTN, DM, recent admission to [facility] 5/26 through 6/5/2025 for CHF/fluid overload/COPD exacerbation, presents to the ED form [facility] for evaluation of fever and generalized feeling of being unwell, found to be septic and hypotensive.       baseline o2-dep copd  adm with pna  s/p bronch for hemoptysis, oozing from rul but no lesion found  had been on 1e but developed hypothermia, hypotension, unresponsiveness and hypercarbic resp failure  she has not been wearing her bipap, and has been told that this is likely to result in recurrent episodes of severe hypercarbic resp failure  no moved to icu and is on avaps with improvement  normal lvef with lvh and mod-sev pulm htn, no need to repeat at this time  has had degree of vol ol at times, now appears euvolemic, though pocus in the icu suggests degree of vol ol given incr ivc  can consider cautious diuresis  had been on torsemide, now on iv lasix  can cont diuretics for now if creat stalbe  no acute ischemia   bp had been stable off Midodrine and Northera, and off steroids, noting addl hx of dysautonomia  on stress dose steroids   pressors as appropriate, now dcd  paf and vte was on ac, now off 2/2 hemoptysis   to remains off for now as per pulm given recurrent bleeding   episodes pat, cont amio and bb.

## 2025-06-29 NOTE — PROGRESS NOTE ADULT - SUBJECTIVE AND OBJECTIVE BOX
INTERVAL HPI/OVERNIGHT EVENTS: No acute overnight events occurred.    SUBJECTIVE: Seen and examined pt at bedside. Pt off of Bipap this morning satting well on 3L NC. Pt responding to questions appropriately. Pt without complaints this morning. Denies chest pain, SOB, abdominal pain, n/v.     Review of Systems:  Constitutional: No fever, chills, fatigue  Neuro: No headache, numbness, weakness  Resp: No cough, wheezing, shortness of breath  CVS: No chest pain, palpitations, leg swelling  GI: No abdominal pain, nausea, vomiting, diarrhea   : No dysuria, frequency, incontinence  Skin: No itching, burning, rashes, or lesions   Msk: No joint pain or swelling      ICU Vital Signs Last 24 Hrs  T(C): 37.3 (29 Jun 2025 12:30), Max: 37.3 (28 Jun 2025 17:08)  T(F): 99.2 (29 Jun 2025 12:30), Max: 99.2 (28 Jun 2025 20:24)  HR: 91 (29 Jun 2025 14:00) (61 - 106)  BP: 135/64 (29 Jun 2025 14:00) (87/45 - 159/68)  BP(mean): 84 (29 Jun 2025 14:00) (56 - 108)  ABP: --  ABP(mean): --  RR: 15 (29 Jun 2025 14:00) (5 - 25)  SpO2: 100% (29 Jun 2025 14:00) (92% - 100%)    O2 Parameters below as of 29 Jun 2025 13:54  Patient On (Oxygen Delivery Method): nasal cannula, 3LPM              06-28-25 @ 07:01  -  06-29-25 @ 07:00  --------------------------------------------------------  IN: 898.4 mL / OUT: 500 mL / NET: 398.4 mL    06-29-25 @ 07:01  -  06-29-25 @ 14:08  --------------------------------------------------------  IN: 25 mL / OUT: 600 mL / NET: -575 mL        CAPILLARY BLOOD GLUCOSE      POCT Blood Glucose.: 103 mg/dL (29 Jun 2025 12:09)      I&O's Summary    28 Jun 2025 07:01  -  29 Jun 2025 07:00  --------------------------------------------------------  IN: 898.4 mL / OUT: 500 mL / NET: 398.4 mL    29 Jun 2025 07:01  -  29 Jun 2025 14:08  --------------------------------------------------------  IN: 25 mL / OUT: 600 mL / NET: -575 mL        PHYSICAL EXAM:  General: NAD  Neurology: awake and alert  HEENT: NC/AT  Respiratory: CTA b/l, no rales or rhonchi noted  Cardiovascular: RRR, normal S1S2, no M/R/G  Abdomen: soft, NT/ND, no palpable masses  Extremities: left UE edematous with significant bruising of hand, no cyanosis   Skin: warm/dry      Meds:    aMIOdarone    Tablet Oral  furosemide   Injectable IV Push    dextrose 50% Injectable IV Push  dextrose 50% Injectable IV Push  dextrose 50% Injectable IV Push  dextrose Oral Gel Oral PRN  glucagon  Injectable IntraMuscular  hydrocortisone sodium succinate Injectable IV Push    albuterol/ipratropium for Nebulization Nebulizer  fluticasone propionate/ salmeterol 500-50 MICROgram(s) Diskus Inhalation  sodium chloride 3%  Inhalation Inhalation    acetaminophen     Tablet .. Oral PRN  melatonin Oral        pantoprazole  Injectable IV Push      dextrose 5%. IV Continuous  dextrose 5%. IV Continuous  potassium chloride    Tablet ER Oral      chlorhexidine 2% Cloths Topical                              7.2    2.55  )-----------( Clumped    ( 29 Jun 2025 06:25 )             22.9       06-29    143  |  99  |  41[H]  ----------------------------<  135[H]  3.1[L]   |  39[H]  |  1.20    Ca    8.0[L]      29 Jun 2025 06:25  Phos  4.4     06-29  Mg     1.6     06-29    TPro  5.6[L]  /  Alb  2.6[L]  /  TBili  0.4  /  DBili  x   /  AST  7[L]  /  ALT  17  /  AlkPhos  57  06-29            Urinalysis Basic - ( 29 Jun 2025 06:25 )    Color: x / Appearance: x / SG: x / pH: x  Gluc: 135 mg/dL / Ketone: x  / Bili: x / Urobili: x   Blood: x / Protein: x / Nitrite: x   Leuk Esterase: x / RBC: x / WBC x   Sq Epi: x / Non Sq Epi: x / Bacteria: x                    Tubes/Lines:  peripheral lines   right sided port       GLOBAL ISSUE/BEST PRACTICE:  Analgesia: Y  Sedation: N  HOB elevation: Y  Stress ulcer prophylaxis: Y  VTE prophylaxis: Y  Glycemic control:  glucose well controlled   Nutrition: DASH diet     CODE STATUS: FULL CODE

## 2025-06-29 NOTE — PROGRESS NOTE ADULT - SUBJECTIVE AND OBJECTIVE BOX
[facility]    Sai Valle, Manuel, Girish, El, David      [phone]    CHIEF COMPLAINT: Patient is a 84y old  Female who presents with a chief complaint of septic shock, PNA (29 Jun 2025 11:15)      Follow Up: resp failure, hypotension    Interim history: Unable to provide a history on the basis of a poor mental status/bipap.  Events noted. remains on bipap, bp improved off pressor    MEDICATIONS  (STANDING):  albuterol/ipratropium for Nebulization 3 milliLiter(s) Nebulizer three times a day  aMIOdarone    Tablet 100 milliGRAM(s) Oral daily  chlorhexidine 2% Cloths 1 Application(s) Topical <User Schedule>  dextrose 5%. 1000 milliLiter(s) (50 mL/Hr) IV Continuous <Continuous>  dextrose 5%. 1000 milliLiter(s) (100 mL/Hr) IV Continuous <Continuous>  dextrose 50% Injectable 12.5 Gram(s) IV Push once  dextrose 50% Injectable 25 Gram(s) IV Push once  dextrose 50% Injectable 25 Gram(s) IV Push once  fluticasone propionate/ salmeterol 500-50 MICROgram(s) Diskus 1 Dose(s) Inhalation two times a day  furosemide   Injectable 40 milliGRAM(s) IV Push daily  glucagon  Injectable 1 milliGRAM(s) IntraMuscular once  hydrocortisone sodium succinate Injectable 50 milliGRAM(s) IV Push every 6 hours  melatonin 5 milliGRAM(s) Oral at bedtime  pantoprazole  Injectable 40 milliGRAM(s) IV Push daily  potassium chloride    Tablet ER 20 milliEquivalent(s) Oral every 2 hours  sodium chloride 3%  Inhalation 4 milliLiter(s) Inhalation three times a day    MEDICATIONS  (PRN):  acetaminophen     Tablet .. 650 milliGRAM(s) Oral every 6 hours PRN Mild Pain (1 - 3)  dextrose Oral Gel 15 Gram(s) Oral once PRN Blood Glucose LESS THAN 70 milliGRAM(s)/deciliter      REVIEW OF SYSTEMS: unable to provide  Vital Signs Last 24 Hrs  T(C): 36.9 (29 Jun 2025 07:48), Max: 37.3 (28 Jun 2025 17:08)  T(F): 98.5 (29 Jun 2025 07:48), Max: 99.2 (28 Jun 2025 20:24)  HR: 93 (29 Jun 2025 10:00) (56 - 101)  BP: 130/95 (29 Jun 2025 10:00) (84/41 - 159/68)  BP(mean): 108 (29 Jun 2025 10:00) (55 - 108)  RR: 17 (29 Jun 2025 10:00) (5 - 25)  SpO2: 97% (29 Jun 2025 10:00) (90% - 100%)    Parameters below as of 29 Jun 2025 08:30  Patient On (Oxygen Delivery Method): nasal cannula  O2 Flow (L/min): 2      I&O's Summary    28 Jun 2025 07:01  -  29 Jun 2025 07:00  --------------------------------------------------------  IN: 898.4 mL / OUT: 500 mL / NET: 398.4 mL    29 Jun 2025 07:01  -  29 Jun 2025 11:32  --------------------------------------------------------  IN: 25 mL / OUT: 600 mL / NET: -575 mL        Telemetry past 24h: sr pacs pvcs, brief pat    PHYSICAL EXAM:    Constitutional: well-nourished, well-developed, NAD   HEENT:  bipap, sclerae anicteric, conjunctivae clear, no oral cyanosis.  Pulmonary: Non-labored, breath sounds are clear bilaterally, No wheezing, rales or rhonchi  Cardiovascular: Regular, S1 and S2.  No murmur.  No rubs, gallops or clicks  Gastrointestinal: Bowel Sounds present, soft, nontender.   Lymph: No peripheral edema.   Neurological: unable to evaluate, poor mental status  Skin: No rashes.  Psych:  Mood & affect not evaluable    LABS: All Labs Reviewed:                        7.2    2.55  )-----------( Clumped    ( 29 Jun 2025 06:25 )             22.9                         8.7    9.71  )-----------( Clumped    ( 28 Jun 2025 06:20 )             29.3                         8.6    6.70  )-----------( 178      ( 27 Jun 2025 08:20 )             28.2     29 Jun 2025 06:25    143    |  99     |  41     ----------------------------<  135    3.1     |  39     |  1.20   28 Jun 2025 06:20    138    |  101    |  38     ----------------------------<  142    4.1     |  32     |  1.40   27 Jun 2025 08:20    143    |  98     |  30     ----------------------------<  109    3.7     |  40     |  1.20     Ca    8.0        29 Jun 2025 06:25  Ca    8.4        28 Jun 2025 06:20  Ca    8.5        27 Jun 2025 08:20  Phos  4.4       29 Jun 2025 06:25  Phos  0.7       28 Jun 2025 06:20  Mg     1.6       29 Jun 2025 06:25  Mg     1.7       28 Jun 2025 06:20  Mg     1.7       27 Jun 2025 08:20    TPro  5.6    /  Alb  2.6    /  TBili  0.4    /  DBili  x      /  AST  7      /  ALT  17     /  AlkPhos  57     29 Jun 2025 06:25  TPro  6.1    /  Alb  2.6    /  TBili  0.4    /  DBili  x      /  AST  20     /  ALT  18     /  AlkPhos  63     28 Jun 2025 06:20  TPro  5.8    /  Alb  2.8    /  TBili  0.5    /  DBili  x      /  AST  21     /  ALT  18     /  AlkPhos  63     27 Jun 2025 08:20          Blood Culture:         RADIOLOGY:    EKG:    Echo:

## 2025-06-29 NOTE — PROGRESS NOTE ADULT - CRITICAL CARE ATTENDING COMMENT
Upon my evaluation, this patient is at high risk for imminent or life threatening deterioration due to resp failure, hypotension,  and other active medical issues which require my direct attention, intervention, and personal management.  I have personally spent >35 minutes  of critical care time exclusive of time spent on separate billing procedures. This includes review of laboratory data, radiology results, discussion with primary team\patient, and monitoring for potential decompensation Interventions were performed as documented above.
Upon my evaluation, this patient is at high risk for imminent or life threatening deterioration due to septic shock and other active medical issues which require my direct attention, intervention, and personal management.  I have personally spent >30 minutes  of critical care time exclusive of time spent on separate billing procedures. This includes review of laboratory data, radiology results, discussion with primary team\patient, and monitoring for potential decompensation. Interventions were performed as documented above.
Upon my evaluation, this patient is at high risk for imminent or life threatening deterioration due to resp failure  and other active medical issues which require my direct attention, intervention, and personal management.  I have personally spent >30 minutes  of critical care time exclusive of time spent on separate billing procedures. This includes review of laboratory data, radiology results, discussion with primary team\patient, and monitoring for potential decompensation. Interventions were performed as documented above.
Upon my evaluation, this patient is at high risk for imminent or life threatening deterioration due to resp failure, hypotension,  and other active medical issues which require my direct attention, intervention, and personal management.  I have personally spent >35 minutes  of critical care time exclusive of time spent on separate billing procedures. This includes review of laboratory data, radiology results, discussion with primary team\patient, and monitoring for potential decompensation Interventions were performed as documented above.

## 2025-06-30 LAB
ALBUMIN SERPL ELPH-MCNC: 3 G/DL — LOW (ref 3.3–5)
ALP SERPL-CCNC: 59 U/L — SIGNIFICANT CHANGE UP (ref 40–120)
ALT FLD-CCNC: 23 U/L — SIGNIFICANT CHANGE UP (ref 12–78)
ANION GAP SERPL CALC-SCNC: 4 MMOL/L — LOW (ref 5–17)
AST SERPL-CCNC: 18 U/L — SIGNIFICANT CHANGE UP (ref 15–37)
BASOPHILS # BLD AUTO: 0 K/UL — SIGNIFICANT CHANGE UP (ref 0–0.2)
BASOPHILS NFR BLD AUTO: 0 % — SIGNIFICANT CHANGE UP (ref 0–2)
BILIRUB SERPL-MCNC: 0.4 MG/DL — SIGNIFICANT CHANGE UP (ref 0.2–1.2)
BUN SERPL-MCNC: 32 MG/DL — HIGH (ref 7–23)
CALCIUM SERPL-MCNC: 8 MG/DL — LOW (ref 8.5–10.1)
CHLORIDE SERPL-SCNC: 101 MMOL/L — SIGNIFICANT CHANGE UP (ref 96–108)
CO2 SERPL-SCNC: 40 MMOL/L — HIGH (ref 22–31)
CREAT SERPL-MCNC: 0.97 MG/DL — SIGNIFICANT CHANGE UP (ref 0.5–1.3)
EGFR: 58 ML/MIN/1.73M2 — LOW
EGFR: 58 ML/MIN/1.73M2 — LOW
EOSINOPHIL # BLD AUTO: 0 K/UL — SIGNIFICANT CHANGE UP (ref 0–0.5)
EOSINOPHIL NFR BLD AUTO: 0 % — SIGNIFICANT CHANGE UP (ref 0–6)
GLUCOSE SERPL-MCNC: 134 MG/DL — HIGH (ref 70–99)
HCT VFR BLD CALC: 29.2 % — LOW (ref 34.5–45)
HGB BLD-MCNC: 9.1 G/DL — LOW (ref 11.5–15.5)
IMM GRANULOCYTES # BLD AUTO: 0.03 K/UL — SIGNIFICANT CHANGE UP (ref 0–0.07)
IMM GRANULOCYTES NFR BLD AUTO: 0.8 % — SIGNIFICANT CHANGE UP (ref 0–0.9)
LYMPHOCYTES # BLD AUTO: 0.41 K/UL — LOW (ref 1–3.3)
LYMPHOCYTES NFR BLD AUTO: 10.3 % — LOW (ref 13–44)
MAGNESIUM SERPL-MCNC: 1.7 MG/DL — SIGNIFICANT CHANGE UP (ref 1.6–2.6)
MCHC RBC-ENTMCNC: 29.6 PG — SIGNIFICANT CHANGE UP (ref 27–34)
MCHC RBC-ENTMCNC: 31.2 G/DL — LOW (ref 32–36)
MCV RBC AUTO: 95.1 FL — SIGNIFICANT CHANGE UP (ref 80–100)
MONOCYTES # BLD AUTO: 0.71 K/UL — SIGNIFICANT CHANGE UP (ref 0–0.9)
MONOCYTES NFR BLD AUTO: 17.8 % — HIGH (ref 2–14)
NEUTROPHILS # BLD AUTO: 2.83 K/UL — SIGNIFICANT CHANGE UP (ref 1.8–7.4)
NEUTROPHILS NFR BLD AUTO: 71.1 % — SIGNIFICANT CHANGE UP (ref 43–77)
NRBC # BLD AUTO: 0 K/UL — SIGNIFICANT CHANGE UP (ref 0–0)
NRBC # FLD: 0 K/UL — SIGNIFICANT CHANGE UP (ref 0–0)
NRBC BLD AUTO-RTO: 0 /100 WBCS — SIGNIFICANT CHANGE UP (ref 0–0)
PHOSPHATE SERPL-MCNC: 2.7 MG/DL — SIGNIFICANT CHANGE UP (ref 2.5–4.5)
PLATELET # BLD AUTO: 145 K/UL — LOW (ref 150–400)
PMV BLD: 12.3 FL — SIGNIFICANT CHANGE UP (ref 7–13)
POTASSIUM SERPL-MCNC: 3.8 MMOL/L — SIGNIFICANT CHANGE UP (ref 3.5–5.3)
POTASSIUM SERPL-SCNC: 3.8 MMOL/L — SIGNIFICANT CHANGE UP (ref 3.5–5.3)
PROT SERPL-MCNC: 6.5 G/DL — SIGNIFICANT CHANGE UP (ref 6–8.3)
RBC # BLD: 3.07 M/UL — LOW (ref 3.8–5.2)
RBC # FLD: 17.9 % — HIGH (ref 10.3–14.5)
SODIUM SERPL-SCNC: 145 MMOL/L — SIGNIFICANT CHANGE UP (ref 135–145)
WBC # BLD: 3.98 K/UL — SIGNIFICANT CHANGE UP (ref 3.8–10.5)
WBC # FLD AUTO: 3.98 K/UL — SIGNIFICANT CHANGE UP (ref 3.8–10.5)

## 2025-06-30 PROCEDURE — 84145 PROCALCITONIN (PCT): CPT

## 2025-06-30 PROCEDURE — 87070 CULTURE OTHR SPECIMN AEROBIC: CPT

## 2025-06-30 PROCEDURE — 99233 SBSQ HOSP IP/OBS HIGH 50: CPT

## 2025-06-30 PROCEDURE — 83516 IMMUNOASSAY NONANTIBODY: CPT

## 2025-06-30 PROCEDURE — 92610 EVALUATE SWALLOWING FUNCTION: CPT

## 2025-06-30 PROCEDURE — 93005 ELECTROCARDIOGRAM TRACING: CPT

## 2025-06-30 PROCEDURE — 87798 DETECT AGENT NOS DNA AMP: CPT

## 2025-06-30 PROCEDURE — 83605 ASSAY OF LACTIC ACID: CPT

## 2025-06-30 PROCEDURE — 86901 BLOOD TYPING SEROLOGIC RH(D): CPT

## 2025-06-30 PROCEDURE — 85027 COMPLETE CBC AUTOMATED: CPT

## 2025-06-30 PROCEDURE — 87081 CULTURE SCREEN ONLY: CPT

## 2025-06-30 PROCEDURE — 86850 RBC ANTIBODY SCREEN: CPT

## 2025-06-30 PROCEDURE — 36430 TRANSFUSION BLD/BLD COMPNT: CPT

## 2025-06-30 PROCEDURE — 94640 AIRWAY INHALATION TREATMENT: CPT

## 2025-06-30 PROCEDURE — 87581 M.PNEUMON DNA AMP PROBE: CPT

## 2025-06-30 PROCEDURE — 87077 CULTURE AEROBIC IDENTIFY: CPT

## 2025-06-30 PROCEDURE — 87205 SMEAR GRAM STAIN: CPT

## 2025-06-30 PROCEDURE — 97116 GAIT TRAINING THERAPY: CPT

## 2025-06-30 PROCEDURE — 87305 ASPERGILLUS AG IA: CPT

## 2025-06-30 PROCEDURE — 85014 HEMATOCRIT: CPT

## 2025-06-30 PROCEDURE — 86900 BLOOD TYPING SEROLOGIC ABO: CPT

## 2025-06-30 PROCEDURE — 36600 WITHDRAWAL OF ARTERIAL BLOOD: CPT

## 2025-06-30 PROCEDURE — 93971 EXTREMITY STUDY: CPT

## 2025-06-30 PROCEDURE — 81001 URINALYSIS AUTO W/SCOPE: CPT

## 2025-06-30 PROCEDURE — 87116 MYCOBACTERIA CULTURE: CPT

## 2025-06-30 PROCEDURE — 87086 URINE CULTURE/COLONY COUNT: CPT

## 2025-06-30 PROCEDURE — G0545: CPT

## 2025-06-30 PROCEDURE — A9567: CPT

## 2025-06-30 PROCEDURE — 80053 COMPREHEN METABOLIC PANEL: CPT

## 2025-06-30 PROCEDURE — 97530 THERAPEUTIC ACTIVITIES: CPT

## 2025-06-30 PROCEDURE — 36415 COLL VENOUS BLD VENIPUNCTURE: CPT

## 2025-06-30 PROCEDURE — 94760 N-INVAS EAR/PLS OXIMETRY 1: CPT

## 2025-06-30 PROCEDURE — 87641 MR-STAPH DNA AMP PROBE: CPT

## 2025-06-30 PROCEDURE — 85025 COMPLETE CBC W/AUTO DIFF WBC: CPT

## 2025-06-30 PROCEDURE — 71045 X-RAY EXAM CHEST 1 VIEW: CPT

## 2025-06-30 PROCEDURE — 86036 ANCA SCREEN EACH ANTIBODY: CPT

## 2025-06-30 PROCEDURE — 87640 STAPH A DNA AMP PROBE: CPT

## 2025-06-30 PROCEDURE — 87040 BLOOD CULTURE FOR BACTERIA: CPT

## 2025-06-30 PROCEDURE — 84484 ASSAY OF TROPONIN QUANT: CPT

## 2025-06-30 PROCEDURE — 87451 POLYVALENT MULT ORG EA AG IA: CPT

## 2025-06-30 PROCEDURE — 99232 SBSQ HOSP IP/OBS MODERATE 35: CPT

## 2025-06-30 PROCEDURE — 86225 DNA ANTIBODY NATIVE: CPT

## 2025-06-30 PROCEDURE — 87899 AGENT NOS ASSAY W/OPTIC: CPT

## 2025-06-30 PROCEDURE — 82550 ASSAY OF CK (CPK): CPT

## 2025-06-30 PROCEDURE — 97162 PT EVAL MOD COMPLEX 30 MIN: CPT

## 2025-06-30 PROCEDURE — 83036 HEMOGLOBIN GLYCOSYLATED A1C: CPT

## 2025-06-30 PROCEDURE — 86140 C-REACTIVE PROTEIN: CPT

## 2025-06-30 PROCEDURE — 82962 GLUCOSE BLOOD TEST: CPT

## 2025-06-30 PROCEDURE — 87252 VIRUS INOCULATION TISSUE: CPT

## 2025-06-30 PROCEDURE — 88305 TISSUE EXAM BY PATHOLOGIST: CPT

## 2025-06-30 PROCEDURE — 87449 NOS EACH ORGANISM AG IA: CPT

## 2025-06-30 PROCEDURE — 85652 RBC SED RATE AUTOMATED: CPT

## 2025-06-30 PROCEDURE — 86038 ANTINUCLEAR ANTIBODIES: CPT

## 2025-06-30 PROCEDURE — 85610 PROTHROMBIN TIME: CPT

## 2025-06-30 PROCEDURE — 93970 EXTREMITY STUDY: CPT

## 2025-06-30 PROCEDURE — P9040: CPT

## 2025-06-30 PROCEDURE — 71250 CT THORAX DX C-: CPT

## 2025-06-30 PROCEDURE — 94660 CPAP INITIATION&MGMT: CPT

## 2025-06-30 PROCEDURE — 86146 BETA-2 GLYCOPROTEIN ANTIBODY: CPT

## 2025-06-30 PROCEDURE — 84100 ASSAY OF PHOSPHORUS: CPT

## 2025-06-30 PROCEDURE — 86147 CARDIOLIPIN ANTIBODY EA IG: CPT

## 2025-06-30 PROCEDURE — 83880 ASSAY OF NATRIURETIC PEPTIDE: CPT

## 2025-06-30 PROCEDURE — 80048 BASIC METABOLIC PNL TOTAL CA: CPT

## 2025-06-30 PROCEDURE — 87594 PNEUMCYSTS JIROVECII AMP PRB: CPT

## 2025-06-30 PROCEDURE — 85018 HEMOGLOBIN: CPT

## 2025-06-30 PROCEDURE — 93306 TTE W/DOPPLER COMPLETE: CPT

## 2025-06-30 PROCEDURE — 83735 ASSAY OF MAGNESIUM: CPT

## 2025-06-30 PROCEDURE — 93931 UPPER EXTREMITY STUDY: CPT

## 2025-06-30 PROCEDURE — 0241U: CPT

## 2025-06-30 PROCEDURE — 97110 THERAPEUTIC EXERCISES: CPT

## 2025-06-30 PROCEDURE — 87102 FUNGUS ISOLATION CULTURE: CPT

## 2025-06-30 PROCEDURE — 86431 RHEUMATOID FACTOR QUANT: CPT

## 2025-06-30 PROCEDURE — 78582 LUNG VENTILAT&PERFUS IMAGING: CPT

## 2025-06-30 PROCEDURE — 88112 CYTOPATH CELL ENHANCE TECH: CPT

## 2025-06-30 PROCEDURE — 85730 THROMBOPLASTIN TIME PARTIAL: CPT

## 2025-06-30 PROCEDURE — 82803 BLOOD GASES ANY COMBINATION: CPT

## 2025-06-30 PROCEDURE — 82553 CREATINE MB FRACTION: CPT

## 2025-06-30 PROCEDURE — A9540: CPT

## 2025-06-30 PROCEDURE — 86923 COMPATIBILITY TEST ELECTRIC: CPT

## 2025-06-30 PROCEDURE — 86480 TB TEST CELL IMMUN MEASURE: CPT

## 2025-06-30 RX ORDER — INSULIN LISPRO 100 U/ML
INJECTION, SOLUTION INTRAVENOUS; SUBCUTANEOUS
Refills: 0 | Status: DISCONTINUED | OUTPATIENT
Start: 2025-06-30 | End: 2025-07-04

## 2025-06-30 RX ORDER — HYDROCORTISONE 20 MG
25 TABLET ORAL EVERY 12 HOURS
Refills: 0 | Status: DISCONTINUED | OUTPATIENT
Start: 2025-07-01 | End: 2025-07-03

## 2025-06-30 RX ORDER — INSULIN LISPRO 100 U/ML
INJECTION, SOLUTION INTRAVENOUS; SUBCUTANEOUS AT BEDTIME
Refills: 0 | Status: DISCONTINUED | OUTPATIENT
Start: 2025-06-30 | End: 2025-07-04

## 2025-06-30 RX ADMIN — Medication 4 MILLILITER(S): at 20:17

## 2025-06-30 RX ADMIN — Medication 50 MILLIGRAM(S): at 05:59

## 2025-06-30 RX ADMIN — Medication 50 MILLIGRAM(S): at 13:04

## 2025-06-30 RX ADMIN — Medication 1 DOSE(S): at 21:56

## 2025-06-30 RX ADMIN — AMIODARONE HYDROCHLORIDE 100 MILLIGRAM(S): 50 INJECTION, SOLUTION INTRAVENOUS at 05:59

## 2025-06-30 RX ADMIN — INSULIN LISPRO 1: 100 INJECTION, SOLUTION INTRAVENOUS; SUBCUTANEOUS at 13:22

## 2025-06-30 RX ADMIN — IPRATROPIUM BROMIDE AND ALBUTEROL SULFATE 3 MILLILITER(S): .5; 2.5 SOLUTION RESPIRATORY (INHALATION) at 08:26

## 2025-06-30 RX ADMIN — Medication 50 MILLIGRAM(S): at 21:56

## 2025-06-30 RX ADMIN — Medication 5 MILLIGRAM(S): at 21:56

## 2025-06-30 RX ADMIN — Medication 4 MILLILITER(S): at 08:26

## 2025-06-30 RX ADMIN — Medication 4 MILLILITER(S): at 13:51

## 2025-06-30 RX ADMIN — IPRATROPIUM BROMIDE AND ALBUTEROL SULFATE 3 MILLILITER(S): .5; 2.5 SOLUTION RESPIRATORY (INHALATION) at 13:51

## 2025-06-30 RX ADMIN — Medication 40 MILLIGRAM(S): at 13:04

## 2025-06-30 RX ADMIN — FUROSEMIDE 40 MILLIGRAM(S): 10 INJECTION INTRAMUSCULAR; INTRAVENOUS at 05:59

## 2025-06-30 RX ADMIN — IPRATROPIUM BROMIDE AND ALBUTEROL SULFATE 3 MILLILITER(S): .5; 2.5 SOLUTION RESPIRATORY (INHALATION) at 20:17

## 2025-06-30 RX ADMIN — Medication 1 DOSE(S): at 09:21

## 2025-06-30 NOTE — CHART NOTE - NSCHARTNOTEFT_GEN_A_CORE
Dietitian follow up :     Factors impacting intake: [ ] none [ ] nausea  [ ] vomiting [ ] diarrhea [ ] constipation  [ ]chewing problems [ ] swallowing issues  [ ] other:     Diet Prescription: Diet, Regular:   DASH/TLC {Sodium & Cholesterol Restricted} (06-23-25 @ 13:52)    Intake:     Current Weight: Weight (kg): 73.3 (06-28 @ 09:35)    Pertinent Medications: MEDICATIONS  (STANDING):  albuterol/ipratropium for Nebulization 3 milliLiter(s) Nebulizer three times a day  aMIOdarone    Tablet 100 milliGRAM(s) Oral daily  chlorhexidine 2% Cloths 1 Application(s) Topical <User Schedule>  dextrose 5%. 1000 milliLiter(s) (50 mL/Hr) IV Continuous <Continuous>  dextrose 5%. 1000 milliLiter(s) (100 mL/Hr) IV Continuous <Continuous>  dextrose 50% Injectable 12.5 Gram(s) IV Push once  dextrose 50% Injectable 25 Gram(s) IV Push once  dextrose 50% Injectable 25 Gram(s) IV Push once  fluticasone propionate/ salmeterol 500-50 MICROgram(s) Diskus 1 Dose(s) Inhalation two times a day  furosemide   Injectable 40 milliGRAM(s) IV Push daily  glucagon  Injectable 1 milliGRAM(s) IntraMuscular once  hydrocortisone sodium succinate Injectable 50 milliGRAM(s) IV Push every 8 hours  melatonin 5 milliGRAM(s) Oral at bedtime  pantoprazole  Injectable 40 milliGRAM(s) IV Push daily  sodium chloride 3%  Inhalation 4 milliLiter(s) Inhalation three times a day    MEDICATIONS  (PRN):  acetaminophen     Tablet .. 650 milliGRAM(s) Oral every 6 hours PRN Mild Pain (1 - 3)  dextrose Oral Gel 15 Gram(s) Oral once PRN Blood Glucose LESS THAN 70 milliGRAM(s)/deciliter    Pertinent Labs: 06-29 Na143 mmol/L Glu 135 mg/dL[H] K+ 3.1 mmol/L[L] Cr  1.20 mg/dL BUN 41 mg/dL[H] 06-29 Phos 4.4 mg/dL 06-29 Alb 2.6 g/dL[L]     CAPILLARY BLOOD GLUCOSE      POCT Blood Glucose.: 150 mg/dL (30 Jun 2025 08:43)  POCT Blood Glucose.: 124 mg/dL (29 Jun 2025 21:10)  POCT Blood Glucose.: 138 mg/dL (29 Jun 2025 17:14)  POCT Blood Glucose.: 103 mg/dL (29 Jun 2025 12:09)    Skin:     Estimated Needs:   [ ] no change since previous assessment  [ ] recalculated:     Previous Nutrition Diagnosis:   [ ] Inadequate Energy Intake [ ]Inadequate Oral Intake [ ] Excessive Energy Intake   [ ] Underweight [ ] Increased Nutrient Needs [ ] Overweight/Obesity   [ ] Altered GI Function [ ] Unintended Weight Loss [ ] Food & Nutrition Related Knowledge Deficit [ ] Malnutrition     Nutrition Diagnosis is [ ] ongoing  [ ] resolved [ ] not applicable     New Nutrition Diagnosis: [ ] not applicable       Interventions:   Recommend  [ ] Change Diet To:  [ ] Nutrition Supplement  [ ] Nutrition Support  [ ] Other:     Monitoring and Evaluation:   [ ] PO intake [ x ] Tolerance to diet prescription [ x ] weights [ x ] labs[ x ] follow up per protocol  [ ] other: Dietitian follow up : ( 6/29/25)  Pt off of Bipap this morning satting well on 3L NC. Pt responding to questions appropriately. Pt without complaints this morning. Denies chest pain, SOB, abdominal pain, n/v.       Factors impacting intake: [ ] none [ ] nausea  [ ] vomiting [ ] diarrhea [ ] constipation  [ ]chewing problems [ ] swallowing issues  [ ] other:     Diet Prescription: Diet, Regular:   DASH/TLC {Sodium & Cholesterol Restricted} (06-23-25 @ 13:52)    Intake:     Current Weight: Weight (kg): 73.3 (06-28 @ 09:35)    Pertinent Medications: MEDICATIONS  (STANDING):  albuterol/ipratropium for Nebulization 3 milliLiter(s) Nebulizer three times a day  aMIOdarone    Tablet 100 milliGRAM(s) Oral daily  chlorhexidine 2% Cloths 1 Application(s) Topical <User Schedule>  dextrose 5%. 1000 milliLiter(s) (50 mL/Hr) IV Continuous <Continuous>  dextrose 5%. 1000 milliLiter(s) (100 mL/Hr) IV Continuous <Continuous>  dextrose 50% Injectable 12.5 Gram(s) IV Push once  dextrose 50% Injectable 25 Gram(s) IV Push once  dextrose 50% Injectable 25 Gram(s) IV Push once  fluticasone propionate/ salmeterol 500-50 MICROgram(s) Diskus 1 Dose(s) Inhalation two times a day  furosemide   Injectable 40 milliGRAM(s) IV Push daily  glucagon  Injectable 1 milliGRAM(s) IntraMuscular once  hydrocortisone sodium succinate Injectable 50 milliGRAM(s) IV Push every 8 hours  melatonin 5 milliGRAM(s) Oral at bedtime  pantoprazole  Injectable 40 milliGRAM(s) IV Push daily  sodium chloride 3%  Inhalation 4 milliLiter(s) Inhalation three times a day    MEDICATIONS  (PRN):  acetaminophen     Tablet .. 650 milliGRAM(s) Oral every 6 hours PRN Mild Pain (1 - 3)  dextrose Oral Gel 15 Gram(s) Oral once PRN Blood Glucose LESS THAN 70 milliGRAM(s)/deciliter    Pertinent Labs: 06-29 Na143 mmol/L Glu 135 mg/dL[H] K+ 3.1 mmol/L[L] Cr  1.20 mg/dL BUN 41 mg/dL[H] 06-29 Phos 4.4 mg/dL 06-29 Alb 2.6 g/dL[L]     CAPILLARY BLOOD GLUCOSE      POCT Blood Glucose.: 150 mg/dL (30 Jun 2025 08:43)  POCT Blood Glucose.: 124 mg/dL (29 Jun 2025 21:10)  POCT Blood Glucose.: 138 mg/dL (29 Jun 2025 17:14)  POCT Blood Glucose.: 103 mg/dL (29 Jun 2025 12:09)    Skin:     Estimated Needs:   [ ] no change since previous assessment  [ ] recalculated:     Previous Nutrition Diagnosis:   [ ] Inadequate Energy Intake [ ]Inadequate Oral Intake [ ] Excessive Energy Intake   [ ] Underweight [ ] Increased Nutrient Needs [ ] Overweight/Obesity   [ ] Altered GI Function [ ] Unintended Weight Loss [ ] Food & Nutrition Related Knowledge Deficit [ ] Malnutrition     Nutrition Diagnosis is [ ] ongoing  [ ] resolved [ ] not applicable     New Nutrition Diagnosis: [ ] not applicable       Interventions:   Recommend  [ ] Change Diet To:  [ ] Nutrition Supplement  [ ] Nutrition Support  [ ] Other:     Monitoring and Evaluation:   [ ] PO intake [ x ] Tolerance to diet prescription [ x ] weights [ x ] labs[ x ] follow up per protocol  [ ] other: Dietitian follow up : ( 6/29/25)  Pt off of Bipap this morning satting well on 3L NC. Pt responding to questions appropriately. Pt without complaints this morning. Denies chest pain, SOB, abdominal pain, n/v.     84F with HTN, HLD, COPD, HFpEF, paroxysmal afib on eliquis, DVT/PE s/p IVC filter, COPD, CKD, aplastic anemia requiring pRBC transfusion s/p chest wall port, polymyalgia rheumatica on steroids, AVN of hips, initially admitted to the ICU 06/08 with septic shock 2/2 RML PNA requiring levophed for hemodynamic support. Since transferred to the floor. Now with recurrent RRTs for acute on chronic hypercapnic respiratory failure with AMS due to NIPPV noncompliance.     Acute on chronic hypercapnic respiratory failure  Metabolic encephalopathy  Hypotension 2/2 adrenal insufficiency     Neuro:   -Metabolic encephalopathy in the setting of acute hypercapnic respiratory failure. Obtunded initially. Now mentating well and answering questions appropriately. Continue to monitor closely.     CV:   -off of pressors this AM. Maintain MAP>65.   - Hypotensive 2/2 adrenal insufficiency. On stress dose steroids.  -History of afib, continue with PO amio.     Pulm:   -Acute hypercapnic respiratory failure 2/2 noncompliance to NIPPV while on the floor. Satting well this AM on 3L NC   -C/w hypersal nebs and bronchodilators.     GI:   -restart diet   -protonix daily     Renal:   -Creatinine improved today, continue to trend  -Monitor I's and O's. Replete electrolytes prn.     ID:   -last dose of Zosyn yesterday for PNA   -Monitor WBC and temps.     Heme:   -AC held given recent hemoptysis. Trend CBCs.   -SCDs for now.     Endo:   -On stress dose steroids.   -ISS. FS. Maintain euglycemia.         Factors impacting intake: [ ] none [ ] nausea  [ ] vomiting [ ] diarrhea [ ] constipation  [ ]chewing problems [ ] swallowing issues  [ ] other:     Diet Prescription: Diet, Regular:   DASH/TLC {Sodium & Cholesterol Restricted} (06-23-25 @ 13:52)    Intake:     Current Weight: Weight (kg): 73.3 (06-28 @ 09:35)    Pertinent Medications: MEDICATIONS  (STANDING):  albuterol/ipratropium for Nebulization 3 milliLiter(s) Nebulizer three times a day  aMIOdarone    Tablet 100 milliGRAM(s) Oral daily  chlorhexidine 2% Cloths 1 Application(s) Topical <User Schedule>  dextrose 5%. 1000 milliLiter(s) (50 mL/Hr) IV Continuous <Continuous>  dextrose 5%. 1000 milliLiter(s) (100 mL/Hr) IV Continuous <Continuous>  dextrose 50% Injectable 12.5 Gram(s) IV Push once  dextrose 50% Injectable 25 Gram(s) IV Push once  dextrose 50% Injectable 25 Gram(s) IV Push once  fluticasone propionate/ salmeterol 500-50 MICROgram(s) Diskus 1 Dose(s) Inhalation two times a day  furosemide   Injectable 40 milliGRAM(s) IV Push daily  glucagon  Injectable 1 milliGRAM(s) IntraMuscular once  hydrocortisone sodium succinate Injectable 50 milliGRAM(s) IV Push every 8 hours  melatonin 5 milliGRAM(s) Oral at bedtime  pantoprazole  Injectable 40 milliGRAM(s) IV Push daily  sodium chloride 3%  Inhalation 4 milliLiter(s) Inhalation three times a day    MEDICATIONS  (PRN):  acetaminophen     Tablet .. 650 milliGRAM(s) Oral every 6 hours PRN Mild Pain (1 - 3)  dextrose Oral Gel 15 Gram(s) Oral once PRN Blood Glucose LESS THAN 70 milliGRAM(s)/deciliter    Pertinent Labs: 06-29 Na143 mmol/L Glu 135 mg/dL[H] K+ 3.1 mmol/L[L] Cr  1.20 mg/dL BUN 41 mg/dL[H] 06-29 Phos 4.4 mg/dL 06-29 Alb 2.6 g/dL[L]     CAPILLARY BLOOD GLUCOSE      POCT Blood Glucose.: 150 mg/dL (30 Jun 2025 08:43)  POCT Blood Glucose.: 124 mg/dL (29 Jun 2025 21:10)  POCT Blood Glucose.: 138 mg/dL (29 Jun 2025 17:14)  POCT Blood Glucose.: 103 mg/dL (29 Jun 2025 12:09)    Skin:     Estimated Needs:   [ ] no change since previous assessment  [ ] recalculated:     Previous Nutrition Diagnosis:   [ ] Inadequate Energy Intake [ ]Inadequate Oral Intake [ ] Excessive Energy Intake   [ ] Underweight [ ] Increased Nutrient Needs [ ] Overweight/Obesity   [ ] Altered GI Function [ ] Unintended Weight Loss [ ] Food & Nutrition Related Knowledge Deficit [ ] Malnutrition     Nutrition Diagnosis is [ ] ongoing  [ ] resolved [ ] not applicable     New Nutrition Diagnosis: [ ] not applicable       Interventions:   Recommend  [ ] Change Diet To:  [ ] Nutrition Supplement  [ ] Nutrition Support  [ ] Other:     Monitoring and Evaluation:   [ ] PO intake [ x ] Tolerance to diet prescription [ x ] weights [ x ] labs[ x ] follow up per protocol  [ ] other: Dietitian follow up :   84F with HTN, HLD, COPD, HFpEF, paroxysmal afib on eliquis, DVT/PE s/p IVC filter, COPD, CKD, aplastic anemia requiring pRBC transfusion s/p chest wall port, polymyalgia rheumatica on steroids, AVN of hips, adrenal insufficiency initially admitted to the ICU 06/08 with septic shock 2/2 RML PNA requiring levophed for hemodynamic support. Since transferred to the floor. Pt s/p recurrent RRTs for acute on chronic hypercapnic respiratory failure with AMS due to NIPPV noncompliance. Pt now mentating well and answering questions appropriately. As of 6/29/25 off of Bipap this morning satting well on 3L NC.          Factors impacting intake: [ ] none [ ] nausea  [ ] vomiting [ ] diarrhea [ ] constipation  [ ]chewing problems [ ] swallowing issues  [ ] other:     Diet Prescription: Diet, Regular:   DASH/TLC {Sodium & Cholesterol Restricted} (06-23-25 @ 13:52)    Intake:     Current Weight: Weight (kg): 73.3 (06-28 @ 09:35)    Pertinent Medications: MEDICATIONS  (STANDING):  albuterol/ipratropium for Nebulization 3 milliLiter(s) Nebulizer three times a day  aMIOdarone    Tablet 100 milliGRAM(s) Oral daily  chlorhexidine 2% Cloths 1 Application(s) Topical <User Schedule>  dextrose 5%. 1000 milliLiter(s) (50 mL/Hr) IV Continuous <Continuous>  dextrose 5%. 1000 milliLiter(s) (100 mL/Hr) IV Continuous <Continuous>  dextrose 50% Injectable 12.5 Gram(s) IV Push once  dextrose 50% Injectable 25 Gram(s) IV Push once  dextrose 50% Injectable 25 Gram(s) IV Push once  fluticasone propionate/ salmeterol 500-50 MICROgram(s) Diskus 1 Dose(s) Inhalation two times a day  furosemide   Injectable 40 milliGRAM(s) IV Push daily  glucagon  Injectable 1 milliGRAM(s) IntraMuscular once  hydrocortisone sodium succinate Injectable 50 milliGRAM(s) IV Push every 8 hours  melatonin 5 milliGRAM(s) Oral at bedtime  pantoprazole  Injectable 40 milliGRAM(s) IV Push daily  sodium chloride 3%  Inhalation 4 milliLiter(s) Inhalation three times a day    MEDICATIONS  (PRN):  acetaminophen     Tablet .. 650 milliGRAM(s) Oral every 6 hours PRN Mild Pain (1 - 3)  dextrose Oral Gel 15 Gram(s) Oral once PRN Blood Glucose LESS THAN 70 milliGRAM(s)/deciliter    Pertinent Labs: 06-29 Na143 mmol/L Glu 135 mg/dL[H] K+ 3.1 mmol/L[L] Cr  1.20 mg/dL BUN 41 mg/dL[H] 06-29 Phos 4.4 mg/dL 06-29 Alb 2.6 g/dL[L]     CAPILLARY BLOOD GLUCOSE      POCT Blood Glucose.: 150 mg/dL (30 Jun 2025 08:43)  POCT Blood Glucose.: 124 mg/dL (29 Jun 2025 21:10)  POCT Blood Glucose.: 138 mg/dL (29 Jun 2025 17:14)  POCT Blood Glucose.: 103 mg/dL (29 Jun 2025 12:09)    Skin:     Estimated Needs:   [ ] no change since previous assessment  [ ] recalculated:     Previous Nutrition Diagnosis:   [ ] Inadequate Energy Intake [ ]Inadequate Oral Intake [ ] Excessive Energy Intake   [ ] Underweight [ ] Increased Nutrient Needs [ ] Overweight/Obesity   [ ] Altered GI Function [ ] Unintended Weight Loss [ ] Food & Nutrition Related Knowledge Deficit [ ] Malnutrition     Nutrition Diagnosis is [ ] ongoing  [ ] resolved [ ] not applicable     New Nutrition Diagnosis: [ ] not applicable       Interventions:   Recommend  [ ] Change Diet To:  [ ] Nutrition Supplement  [ ] Nutrition Support  [ ] Other:     Monitoring and Evaluation:   [ ] PO intake [ x ] Tolerance to diet prescription [ x ] weights [ x ] labs[ x ] follow up per protocol  [ ] other: Dietitian follow up : "  84F with HTN, HLD, COPD, HFpEF, paroxysmal afib on eliquis, DVT/PE s/p IVC filter, COPD, CKD, aplastic anemia requiring pRBC transfusion s/p chest wall port, polymyalgia rheumatica on steroids, AVN of hips, adrenal insufficiency initially admitted to the ICU 06/08 with septic shock 2/2 RML PNA requiring levophed for hemodynamic support. . Pt s/p recurrent RRTs for acute on chronic hypercapnic respiratory failure with AMS due to NIPPV noncompliance. Pt now mentating well and answering questions appropriately and subsequently transferred to the floors  Pt off of Bipap satting well on 3L NC.  "    At time of RD visit to pts bedside, states she gets tray set up assistance, can feed herself, describes her appetite and intake as fair right now, just had a normal BM a short while ago     Factors impacting intake: [ ] none [ ] nausea  [ ] vomiting [ ] diarrhea [ ] constipation  [ ]chewing problems [ ] swallowing issues  [X ] other: debility, needs some meal assistance    Diet Prescription: Diet, Regular:   DASH/TLC {Sodium & Cholesterol Restricted} (06-23-25 @ 13:52)    Intake: 50-65%    Current Weight: Weight (kg): 73.3 (06-28 @ 09:35)                                            69.8 kg ( 6/26/25)                                             76.2 kg ( 6/25/25)                                             70.7 kg ( 6/24/25)                                             72.7 kg ( 6/23/25)                                             80 kg ( 6/20/25)                                             69.1 kg ( 6/18/25)                                             dry wt pta 64.4 kg /142#  fluctuating wts likely  r/t fluid shifts , currently with edema 1+ to bilat arm and 2 + to bilat ankle, might also be a component of scale inaccuracy     Pertinent Medications: MEDICATIONS  (STANDING):  albuterol/ipratropium for Nebulization 3 milliLiter(s) Nebulizer three times a day  aMIOdarone    Tablet 100 milliGRAM(s) Oral daily  chlorhexidine 2% Cloths 1 Application(s) Topical <User Schedule>  dextrose 5%. 1000 milliLiter(s) (50 mL/Hr) IV Continuous <Continuous>  dextrose 5%. 1000 milliLiter(s) (100 mL/Hr) IV Continuous <Continuous>  dextrose 50% Injectable 12.5 Gram(s) IV Push once  dextrose 50% Injectable 25 Gram(s) IV Push once  dextrose 50% Injectable 25 Gram(s) IV Push once  fluticasone propionate/ salmeterol 500-50 MICROgram(s) Diskus 1 Dose(s) Inhalation two times a day  furosemide   Injectable 40 milliGRAM(s) IV Push daily  glucagon  Injectable 1 milliGRAM(s) IntraMuscular once  hydrocortisone sodium succinate Injectable 50 milliGRAM(s) IV Push every 8 hours  melatonin 5 milliGRAM(s) Oral at bedtime  pantoprazole  Injectable 40 milliGRAM(s) IV Push daily  sodium chloride 3%  Inhalation 4 milliLiter(s) Inhalation three times a day    MEDICATIONS  (PRN):  acetaminophen     Tablet .. 650 milliGRAM(s) Oral every 6 hours PRN Mild Pain (1 - 3)  dextrose Oral Gel 15 Gram(s) Oral once PRN Blood Glucose LESS THAN 70 milliGRAM(s)/deciliter    Pertinent Labs: 06-29 Na143 mmol/L Glu 135 mg/dL[H] K+ 3.1 mmol/L[L] Cr  1.20 mg/dL BUN 41 mg/dL[H] 06-29 Phos 4.4 mg/dL 06-29 Alb 2.6 g/dL[L]     CAPILLARY BLOOD GLUCOSE    POCT Blood Glucose.: 150 mg/dL (30 Jun 2025 08:43)  POCT Blood Glucose.: 124 mg/dL (29 Jun 2025 21:10)  POCT Blood Glucose.: 138 mg/dL (29 Jun 2025 17:14)  POCT Blood Glucose.: 103 mg/dL (29 Jun 2025 12:09)  all are w/i acceptable range     Skin: stage I PI to sacrum, L hip and L heel    Estimated Needs:   [X ] no change since previous assessment, based on dry wt of 64.4 kg ( 25-30 kcals/kg) 8547-5511 kcals and ( 1-1.2 gm pro/kg) ~ 65-80 gm protein       Previous Nutrition Diagnosis:   [X ] Inadequate Energy Intake [ ]Inadequate Oral Intake [ ] Excessive Energy Intake   [ ] Underweight [ ] Increased Nutrient Needs [ ] Overweight/Obesity   [ ] Altered GI Function [ ] Unintended Weight Loss [ ] Food & Nutrition Related Knowledge Deficit [ ] Malnutrition     Nutrition Diagnosis is [X ] ongoing but improving [ ] resolved [ ] not applicable     New Nutrition Diagnosis: [X ] not applicable     Interventions:   Recommend  [ ] Change Diet To:  [ ] Nutrition Supplement  [ ] Nutrition Support  [X ] Other: cont current POC     Monitoring and Evaluation:   [X ] PO intake [ x ] Tolerance to diet prescription [ x ] weights [ x ] labs[ x ] follow up per protocol  [X ] other: skin integrity

## 2025-06-30 NOTE — PROGRESS NOTE ADULT - SUBJECTIVE AND OBJECTIVE BOX
[facility]                                                       Dr. Chisholm, Dr. Franklin, Dr. Baron, Dr. Clark, Dr. Stock                                      [facility], [facility], [address]                                                 [address] [place] [address]                                      Ph: [phone]  Fax: [phone]                                         Ph: [phone]  Fax: [phone]      Patient is a 84y old  Female who presents with a chief complaint of septic shock, PNA (09 Jun 2025 12:36)  Patient seen in follow up for KIMBERLY on CKD.        PAST MEDICAL HISTORY:  COPD (chronic obstructive pulmonary disease)    DM (diabetes mellitus)    Pulmonary fibrosis    PAF (paroxysmal atrial fibrillation)    Hiatal hernia    GIB (gastrointestinal bleeding)    Pericarditis    Shoulder fracture, right    Hematoma    H/O aplastic anemia    History of IBS    H/O osteoporosis    HLD (hyperlipidemia)    PMR (polymyalgia rheumatica)    History of basal cell cancer    Chronic kidney disease (CKD)    Hypotension    Presence of IVC filter    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    History of immunodeficiency    Lumbar compression fracture    H/O hiatal hernia    H/O pulmonary fibrosis    H/O sinus bradycardia    History of fracture of right hip       MEDICATIONS  (STANDING):  albuterol/ipratropium for Nebulization 3 milliLiter(s) Nebulizer three times a day  aMIOdarone    Tablet 100 milliGRAM(s) Oral daily  chlorhexidine 2% Cloths 1 Application(s) Topical <User Schedule>  dextrose 5%. 1000 milliLiter(s) (50 mL/Hr) IV Continuous <Continuous>  dextrose 5%. 1000 milliLiter(s) (100 mL/Hr) IV Continuous <Continuous>  dextrose 50% Injectable 12.5 Gram(s) IV Push once  dextrose 50% Injectable 25 Gram(s) IV Push once  dextrose 50% Injectable 25 Gram(s) IV Push once  fluticasone propionate/ salmeterol 500-50 MICROgram(s) Diskus 1 Dose(s) Inhalation two times a day  furosemide   Injectable 40 milliGRAM(s) IV Push daily  glucagon  Injectable 1 milliGRAM(s) IntraMuscular once  hydrocortisone sodium succinate Injectable 50 milliGRAM(s) IV Push every 8 hours  insulin lispro (ADMELOG) corrective regimen sliding scale   SubCutaneous three times a day before meals  insulin lispro (ADMELOG) corrective regimen sliding scale   SubCutaneous at bedtime  melatonin 5 milliGRAM(s) Oral at bedtime  pantoprazole  Injectable 40 milliGRAM(s) IV Push daily  sodium chloride 3%  Inhalation 4 milliLiter(s) Inhalation three times a day    MEDICATIONS  (PRN):  acetaminophen     Tablet .. 650 milliGRAM(s) Oral every 6 hours PRN Mild Pain (1 - 3)  dextrose Oral Gel 15 Gram(s) Oral once PRN Blood Glucose LESS THAN 70 milliGRAM(s)/deciliter    T(C): 36.9 (06-30-25 @ 12:20), Max: 37.3 (06-28-25 @ 17:08)  HR: 78 (06-30-25 @ 12:20) (61 - 106)  BP: 129/75 (06-30-25 @ 12:20) (87/45 - 159/68)  RR: 18 (06-30-25 @ 12:20)  SpO2: 99% (06-30-25 @ 12:20)  Wt(kg): --  I&O's Detail    29 Jun 2025 07:01  -  30 Jun 2025 07:00  --------------------------------------------------------  IN:    IV PiggyBack: 25 mL  Total IN: 25 mL    OUT:    Norepinephrine: 0 mL    Voided (mL): 1000 mL  Total OUT: 1000 mL    Total NET: -975 mL      30 Jun 2025 07:01  -  30 Jun 2025 16:31  --------------------------------------------------------  IN:    Oral Fluid: 450 mL  Total IN: 450 mL    OUT:    Voided (mL): 500 mL  Total OUT: 500 mL    Total NET: -50 mL                      PHYSICAL EXAM:  General: No distress  Respiratory: b/l air entry  Cardiovascular: S1 S2  Gastrointestinal: soft  Extremities:  edema        LABORATORY:                        9.1    3.98  )-----------( 145      ( 30 Jun 2025 08:10 )             29.2     06-30    145  |  101  |  32[H]  ----------------------------<  134[H]  3.8   |  40[H]  |  0.97    Ca    8.0[L]      30 Jun 2025 08:10  Phos  2.7     06-30  Mg     1.7     06-30    TPro  6.5  /  Alb  3.0[L]  /  TBili  0.4  /  DBili  x   /  AST  18  /  ALT  23  /  AlkPhos  59  06-30    Sodium: 145 mmol/L (06-30 @ 08:10)  Sodium: 143 mmol/L (06-29 @ 06:25)    Potassium: 3.8 mmol/L (06-30 @ 08:10)  Potassium: 3.1 mmol/L (06-29 @ 06:25)    Hemoglobin: 9.1 g/dL (06-30 @ 08:10)  Hemoglobin: 7.2 g/dL (06-29 @ 06:25)  Hemoglobin: 8.7 g/dL (06-28 @ 06:20)    Creatinine, Serum 0.97 (06-30 @ 08:10)  Creatinine, Serum 1.20 (06-29 @ 06:25)  Creatinine, Serum 1.40 (06-28 @ 06:20)        LIVER FUNCTIONS - ( 30 Jun 2025 08:10 )  Alb: 3.0 g/dL / Pro: 6.5 g/dL / ALK PHOS: 59 U/L / ALT: 23 U/L / AST: 18 U/L / GGT: x           Urinalysis Basic - ( 30 Jun 2025 08:10 )    Color: x / Appearance: x / SG: x / pH: x  Gluc: 134 mg/dL / Ketone: x  / Bili: x / Urobili: x   Blood: x / Protein: x / Nitrite: x   Leuk Esterase: x / RBC: x / WBC x   Sq Epi: x / Non Sq Epi: x / Bacteria: x      ABG - ( 28 Jun 2025 21:40 )  pH, Arterial: 7.43  pH, Blood: x     /  pCO2: 61    /  pO2: 82    / HCO3: 40    / Base Excess: 16.2  /  SaO2: 98.5

## 2025-06-30 NOTE — PROGRESS NOTE ADULT - SUBJECTIVE AND OBJECTIVE BOX
[facility] -- Sai Valle Pannella, Patel, Savella Goodger, Cohen  Office # [phone]      Follow Up:  resp failure hypotension     Subjective/Observations:   Seen bedside, overall feels weak   no shortness of breath dizziness palptiations     REVIEW OF SYSTEMS: All other review of systems is negative unless indicated above    PAST MEDICAL & SURGICAL HISTORY:  COPD (chronic obstructive pulmonary disease)      DM (diabetes mellitus)  diet-controlled      PAF (paroxysmal atrial fibrillation)  s/p ablation      Hiatal hernia      H/O aplastic anemia      History of IBS      H/O osteoporosis      HLD (hyperlipidemia)      PMR (polymyalgia rheumatica)      History of basal cell cancer      Chronic kidney disease (CKD)      Hypotension      Presence of IVC filter      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      History of immunodeficiency      Lumbar compression fracture      H/O hiatal hernia      H/O pulmonary fibrosis      H/O sinus bradycardia      History of fracture of right hip  "unoperable"      S/P hysterectomy      S/P foot surgery      S/P knee surgery      After cataract  bilateral eye cataract surgically removed      Closed right hip fracture  rigth hip ORIF 2016      S/P IVC filter  2016      S/P carpal tunnel release  Right  & 17      H/O kyphoplasty      Port-A-Cath in place  right chest          MEDICATIONS  (STANDING):  albuterol/ipratropium for Nebulization 3 milliLiter(s) Nebulizer three times a day  aMIOdarone    Tablet 100 milliGRAM(s) Oral daily  chlorhexidine 2% Cloths 1 Application(s) Topical <User Schedule>  dextrose 5%. 1000 milliLiter(s) (50 mL/Hr) IV Continuous <Continuous>  dextrose 5%. 1000 milliLiter(s) (100 mL/Hr) IV Continuous <Continuous>  dextrose 50% Injectable 12.5 Gram(s) IV Push once  dextrose 50% Injectable 25 Gram(s) IV Push once  dextrose 50% Injectable 25 Gram(s) IV Push once  fluticasone propionate/ salmeterol 500-50 MICROgram(s) Diskus 1 Dose(s) Inhalation two times a day  furosemide   Injectable 40 milliGRAM(s) IV Push daily  glucagon  Injectable 1 milliGRAM(s) IntraMuscular once  hydrocortisone sodium succinate Injectable 50 milliGRAM(s) IV Push every 8 hours  melatonin 5 milliGRAM(s) Oral at bedtime  pantoprazole  Injectable 40 milliGRAM(s) IV Push daily  sodium chloride 3%  Inhalation 4 milliLiter(s) Inhalation three times a day    MEDICATIONS  (PRN):  acetaminophen     Tablet .. 650 milliGRAM(s) Oral every 6 hours PRN Mild Pain (1 - 3)  dextrose Oral Gel 15 Gram(s) Oral once PRN Blood Glucose LESS THAN 70 milliGRAM(s)/deciliter      Allergies    No Known Allergies    Intolerances        Vital Signs Last 24 Hrs  T(C): 36.6 (2025 05:11), Max: 37.3 (2025 12:30)  T(F): 97.9 (2025 05:11), Max: 99.2 (2025 12:30)  HR: 79 (2025 05:11) (73 - 106)  BP: 155/81 (2025 05:11) (106/93 - 155/81)  BP(mean): 96 (2025 05:11) (65 - 122)  RR: 18 (2025 05:11) (12 - 22)  SpO2: 96% (2025 05:11) (92% - 100%)    Parameters below as of 2025 05:11  Patient On (Oxygen Delivery Method): BiPAP/CPAP        I&O's Summary    2025 07:01  -  2025 07:00  --------------------------------------------------------  IN: 25 mL / OUT: 1000 mL / NET: -975 mL          PHYSICAL EXAM:  TELE: Sr  Constitutional: NAD, awake and alert, well-developed  HEENT: Moist Mucous Membranes, Anicteric  Pulmonary: Non-labored, breath sounds are DIM   Cardiovascular: Regular, S1 and S2 nl, No murmurs, rubs, gallops or clicks  Gastrointestinal: Bowel Sounds present, soft, nontender.   Lymph: No lymphadenopathy. No peripheral edema.  Skin: No visible rashes or ulcers.  Psych:  Mood & affect appropriate    LABS: All Labs Reviewed:                        9.1    3.98  )-----------( 145      ( 2025 08:10 )             29.2                         7.2    2.55  )-----------( Clumped    ( 2025 06:25 )             22.9                         8.7    9.71  )-----------( Clumped    ( 2025 06:20 )             29.3     2025 08:10    145    |  101    |  32     ----------------------------<  134    3.8     |  40     |  0.97   2025 06:25    143    |  99     |  41     ----------------------------<  135    3.1     |  39     |  1.20   2025 06:20    138    |  101    |  38     ----------------------------<  142    4.1     |  32     |  1.40     Ca    8.0        2025 08:10  Ca    8.0        2025 06:25  Ca    8.4        2025 06:20  Phos  2.7       2025 08:10  Phos  4.4       2025 06:25  Phos  0.7       2025 06:20  Mg     1.7       2025 08:10  Mg     1.6       2025 06:25  Mg     1.7       2025 06:20    TPro  6.5    /  Alb  3.0    /  TBili  0.4    /  DBili  x      /  AST  18     /  ALT  23     /  AlkPhos  59     2025 08:10  TPro  5.6    /  Alb  2.6    /  TBili  0.4    /  DBili  x      /  AST  7      /  ALT  17     /  AlkPhos  57     2025 06:25  TPro  6.1    /  Alb  2.6    /  TBili  0.4    /  DBili  x      /  AST  20     /  ALT  18     /  AlkPhos  63     2025 06:20             EC Lead ECG:   Ventricular Rate 57 BPM    Atrial Rate 57 BPM    P-R Interval 128 ms    QRS Duration 90 ms    Q-T Interval 508 ms    QTC Calculation(Bazett) 494 ms    P Axis 64 degrees    R Axis 15 degrees    T Axis 84 degrees    Diagnosis Line Sinus bradycardia with premature atrial complexes  Prolonged QT  Abnormal ECG  When compared with ECG of 2025 04:43,  Vent. rate has decreased BY  31 BPM  Borderline criteria for Lateral infarct are no longer present  Non-specific change in ST segment in Inferiorleads (25 @ 09:15)      TRANSTHORACIC ECHOCARDIOGRAM REPORT  ________________________________________________________________________________                                      _______       Pt. Name:       MARTINEZ JONES Study Date:    2025  MRN:            [id number]        YOB: 1940  Accession #:    [id number]       Age:           84 years  Account#:       [id number]      Gender:        F  Heart Rate:                     Height:        62.99 in (160.00 cm)  Rhythm:                         Weight:        147.71 lb (67.00 kg)  Blood Pressure: 109/63 mmHg     BSA/BMI:       1.70 m² / 26.17 kg/m²  ________________________________________________________________________________________  Referring Physician:    [id number] Naresh Vitale  Interpreting Physician: Daniel Jorge M.D.  Primary Sonographer:    Jamal Hayes    CPT:               ECHO TTE WO CON COMP W DOPP - 68634.m  Indication(s):     Shock, unspecified - R57.9  Procedure:         Transthoracic echocardiogram with 2-D, M-mode and complete                     spectral and color flow Doppler.  Ordering Location: ICU1  Admission Status:  Inpatient  Study Information: Image quality for this study is technically difficult.    _______________________________________________________________________________________     CONCLUSIONS:      1. Technically difficult image quality.   2. Left ventricular systolic function is normal with an ejection fraction of 67 % by Arias's method of disks.   3. Mild left ventricular hypertrophy.   4.Normal right ventricular cavity size and probably normal right ventricular systolic function.   5. Tricuspid aortic valve with normal leaflet excursion. There is calcification of the aortic valve leaflets.   6. Moderate mitral valve leaflet calcification.   7. Mild mitral regurgitation.   8. Moderate tricuspid regurgitation.   9. The inferior vena cava is normal in size measuring 1.64 cm in diameter, (normal <2.1cm) with normal inspiratory collapse (normal >50%) consistent with normal right atrial pressure (~3, range 0-5mmHg).  10. Estimated pulmonary artery systolic pressure is 57 mmHg, consistent with moderate-to-severe pulmonary hypertension.  11. Trace pericardial effusion.  12. Right pleural effusion noted.  13. Compared to the transthoracic echocardiogram performed on 2025, there have been no significant interval changes.    ________________________________________________________________________________________  FINDINGS:     Left Ventricle:  Left ventricular systolic function is normal with a calculated ejection fraction of 67 % by the Arias's biplane method of disks. Mild left ventricular hypertrophy.     Right Ventricle:  The right ventricular cavity is normal in size and right ventricular systolic function is probablynormal. Tricuspid annular plane systolic excursion (TAPSE) is 1.9 cm (normal >=1.7 cm).     Left Atrium:  The left atrium is normal in size with an indexed volume of 29.06 ml/m².     Right Atrium:  The right atrium is normal in size with an indexed volume of 26.00 ml/m² and an indexed area of 9.41 cm²/m².     Interatrial Septum:  The interatrial septum appears intact.     Aortic Valve:  The aortic valve is tricuspid with normal leaflet excursion. There is calcification of the aortic valve leaflets. There is mild thickening of the aortic valve leaflets. There is trace aortic regurgitation.     Mitral Valve:  Structurally normal mitral valve with normal leaflet excursion. There is calcification of the mitral valve annulus. There is moderate leaflet calcification. There is mild mitral regurgitation.     Tricuspid Valve:  The tricuspid valve is structurally normal with normal leaflet excursion. There is moderate tricuspid regurgitation. Estimated pulmonary artery systolic pressure is 57 mmHg,consistent with moderate-to-severe pulmonary hypertension.     Pulmonic Valve:  The pulmonic valve was not well visualized. There is trace pulmonic regurgitation.     Aorta:  The aortic root at the sinuses of Valsalva is normal in size, measuring 3.30 cm (indexed 1.94 cm/m²). The ascending aorta is normal in size, measuring 3.00 cm (indexed 1.76 cm/m²). The aortic arch diameter is normal in size, measuring 2.4 cm (indexed 1.41 cm/m²).     Pericardium:  There is a trace pericardial effusion.     Pleura:  Right pleural effusion noted.     Systemic Veins:  The inferior vena cava is normal in size measuring 1.64 cm in diameter, (normal <2.1cm) with normal inspiratory collapse (normal >50%) consistent with normal right atrial pressure (~3, range 0-5mmHg).  ____________________________________________________________________  QUANTITATIVE DATA:  Left Ventricle Measurements: (Indexed to BSA)     IVSd (2D):   1.2 cm  LVPWd (2D):  1.1 cm  LVIDd (2D):  4.2 cm  LVIDs (2D):  2.4 cm  LV Mass:     169 g  99.5 g/m²  LV Vol d, MOD A2C: 38.9 ml 22.88 ml/m²  LV Vol d, MOD A4C: 40.9 ml 24.06 ml/m²  LV Vol d, MOD BP:  40.9 ml 24.04 ml/m²  LV Vol s, MOD A2C: 11.3 ml 6.65 ml/m²  LV Vol s, MOD A4C: 13.4 ml 7.88 ml/m²  LV Vol s, MOD BP:  13.4 ml 7.91 ml/m²  LVOT SV MOD BP:    27.4 ml  LV EF% MOD BP:     67 %     MV E Vmax:    1.54 m/s  MV A Vmax:    0.74 m/s  MV E/A:       2.08  e' lateral:   11.90 cm/s  e' medial:    8.81 cm/s  E/e' lateral: 12.94  E/e' medial:  17.48  E/e' Average: 14.87  MV DT:272 msec    Aorta Measurements: (Normal range) (Indexed to BSA)     Ao Root d     3.30 cm (2.7 - 3.3 cm) 1.94 cm/m²  Ao Asc d, 2D: 3.00  Ao Asc prox:  3.00 cm                1.76 cm/m²  Ao Arch:      2.4 cm            Left Atrium Measurements: (Indexed to BSA)  LA Diam 2D: 3.60 cm         Right Ventricle Measurements: Right Atrial Measurements:     TAPSE:            1.9 cm      RA Vol s, MOD A4C         44.2 ml  RV Base (RVID1):  2.9 cm      RA Vol s, MOD A4C i BSA   26.00 ml/m²  RV Mid (RVID2): 2.5 cm      RA Area s, MOD A4C        16.0 cm²  RV Major (RVID3): 6.4 cm      RA Area s, MOD A4C, i BSA 9.41 cm²/m²       LVOT / RVOT/ Qp/Qs Data: (Indexed to BSA)  LVOT Diameter,s: 2.20 cm  LVOT Area:       3.80 cm²    Mitral Valve Measurements:     MV E Vmax: 1.5 m/s         MR Vmax:          3.80 m/s  MV A Vmax: 0.7 m/s         MR Peak Gradient: 57.8 mmHg  MV E/A:    2.1       Tricuspid Valve Measurements:     TR Vmax:          3.7 m/s  TR Peak Gradient: 54.5 mmHg  RA Pressure:      3 mmHg  PASP:             57 mmHg    ________________________________________________________________________________________  Electronically signed on 2025 at 11:19:51 AM by Daniel Jorge M.D.         *** Final ***      Radiology:

## 2025-06-30 NOTE — PROGRESS NOTE ADULT - ASSESSMENT
BEST PRACTICE ISSUES.  . HOB ELEVATN.    .... Yes  . DIET  .   .... DASH 6/8/2025   . FREE WATER.   ....   .  IV FLUID.  .... 6/15-6/20/2025 1/2 ns 50   ..... 6/8 lr 40 x 12 h   . PHARMAC DVT PPLX .    .... 6/22 apixa dced suspect hemoptysis   .... 6/20 apixa 2.5 x 2   .... 6/12-6/14/2025 iv ufh (hospitalist)  .... 6/11/2025 [facility] 5k.2   .... 6/10/2025 4:46 PM iv ufh   .... [facility] 6/9-6/10/2025   . NON PHARMAC DVT PPLX .    .... 6/23/2025 compr device   . STRESS ULCR PPLX .   .... PROTONIX 40 6/22/2025   . DATE/DM MGMT.   ..... See under Endocrine section   GENERAL DATA .   . COVID.         .... scv2 6/8/2025 scv2 (-)   . GOC.    ....    . ICU STAY.   .... 6/28/2025   .... 6/22 - 6/25   .... 6/8-6/9   . INFECTION PPLX .   .... CHLORHEXIDINE 2% 6/8/2025   . ALLGY.   ....  nka  . WT.   .... 6/8/2025 69   . BMI.  ....6/8/2025 26      XXXXXXXXXXXXXXXXXXXXXXX  VITALS/GAS EXCHANGE/DRIPS    ABGS.   6/20/2025 4p 5l 736/71/65   6/22/2025 7p bpap 16/8/1 731/82/153   6/23/2025 11p bpap 12/5/.4 727/92/57  6/23/2025 2a avaps 450/18/8/25/10 .45 740/67/85   6/26/2025 5a 3l 742/70/67   6/28/2025 9p avaps 24/475/30/8/30/10 743/61/82  .  VS/ PO/IO/ VENT/ DRIPS.  6/30/2025 afeb 78 120/70   6/30/2025 4l 100%    XXXXXXXXXXXXXXXXXXXXXXXXXXXXXXXXXXX  PROBLEM ASSESSMENT RECOMMENDATIONS.  RESP.   . GAS EXCHANGE .   .... target PO 90-95%     . NEED FOR HOME OXYGEN   .... 6/18/2025 will need home oxygen if at discharge pulse ox at rest or on exertion is below 88%     . COPD   .... DUONEB.3 6/25   .... ADVAIR 500 6/8/2025   .... MONTELUKAST 10 6/8/2025   .... SPIRIVA 6/8/2025   .... nacl 3% bid 6/22/2025   .... HYDROCORT   ........ 50.2 6/24/2025  ....... 25.2 6/25/2025      . RESP FAILURE  .... 6/8/2025 Has ho hypercapnia   .... 6/8/2025 recommend monitor abg  .... 6/18/2025 requiring 4l nc     . HYPERCAPNIC RESP FAILURE WITHOUT ACIDEMIA   6/20/2025 4p 5l 736/71/65   .... 6/20/2025 sterted noct bpap 35/15/6/16  .... 6/23 noct avaps .45 14 epap 8 vt 450 25/10     . MILD HEMOPTYSIS 6/10/2025   .... ct ch 6/10/2025 cw 5/31  ........ new mf infection in rul   ........ unchanged small r greater than left pl effsn   ....... enlarged pulm artery suggesting pulm hytn   .... 6/10/2025  dw hemat cardio id and instead of restarting apixa will start iv UFH which can be easily reversed in case Hb starts dropping but will be the rx for venous thromboembolism as well as for a fib   .... v duplx 6/10 (-)  .... vq 6/10 vlp   .... 6/13/2025 continues to have mild hemoptysis but is also on iv ufh drip   . 6/13/2025 dw pt re rbaa of bronch and se agrees scheduled for 6/16 10 in or   .... 6/13/2025 dw cardio and id   .... 6/16 fob done showed ooze rul and l lo lobe no eb lesions   .... 6/17/2025 continues to have mild hemoptysis   .... 6/17/2025 30% of hemoptysis cases no cause is found  Ordered gbm ab rf dsdna rf apla chapman   .... 6/18/2025 cbmab dsdna inderjit apla ab all (-)   .... 6/12 fungitell galactomannan (-)   .... strep legn ag 6/9 (-)   .... fob 6/16 afb sm (-) cyto (-)  .... 6/18/2025 hemoptysis likely sec to pneumonia in setting of pulm hypertension   .... 6/20/2025 apixa restarted   .... 6/22/2025 apixa dced    . PLEURITIC CHEST PAIN RO VTE   .... v duplx 6/10 (-)  .... vq 6/10 vlp   .... 6/12/2025 iv ufh was stopped 6/11 as vq vlp but was restarted by hospitalist 6/12/2025 for af   .... chest pain resolvd       INFECTION.  . DATA  .... w 6/22-6/23-6/24-6/26-6/28/2025   .... w 7.2 - 7.8 - 4.6 - 4.8 - 9.7   .... w 6/8-6/10-6/13-6/14-6/15-6/17-6/18-6/19-6/20/2025   .... w 13.6 - 9.2 - 15.9 - 18- 14 - 9.6 - 11.9- 9.8 - 8.7   .... esr 6/8/2025 esr 17   .... w 6/8-6/9/2025 w 32 - 24   .... ua 6/8/2025 w 4   .... cxr 6/8/2025 cxr dense infiltra r upper hilum r mediport  .... ct ch 6/10 cw 5/31  ........ r greater than l effs   ........ partial rll atelectasis   ........ new patchy and nodular areas of consolidation rul and associated ground glass opacities   ........ ground glass and nodular opac also scott   ........ numerous tib rml and rll     . MICROBIO  .... sp 6/8 n commensals   .... flu ab 6/8/2025 (-)   .... rsv 6/8/2025 (-)    .... mrsa 6/9/2025 (-)  .... uc 6/8 (-)  .... bc 6/8 (-)     . PNEUMONIA RUL 6/8/2025   .... AZITHRO 6/8-6/11 /2025   .... ZOSYN 6/8-6/15/2025     . PNEUMONIA   .... ct  6/22/2025 cw 6/10/2025   ....... incr consolidation and ggo rul   ....... new large consolidatn scott and l lo lobe   .... 6/22/2025 zosyn     CARDIAC.  . PAIN CHEST   .... 6/10/2025 co pain chest r   .... 6/10/2025 check ct to see if she has pleurisy   .... 6/10/2025  dw hemat cardio id and instead of restarting apixa will start iv UFH which can be easily reversed in case Hb starts dropping but will be the rx for venous thromboembolism as well as for a fib     . STRESS STEROIDS   .... HYDROCORTISONE   ........ 6/8/2025 h 50.4  ........ 6/10/2025 h 50.2   ........ 6/12/2025 hydrocort 50   ....... 6/14/2025 h 25  ........ 6/17/2025 dced     . SHOCK   .... MIDODRINE 6/8-6/16/2025 m 10.3   .... DROXIDOPA 6/9-6/16/2025 d 100.3   .... NOREPI 6/8-6/9/2025 n 8/250   .... target map 65 (+)     . CAD    .... Trop 6/8-6/9/2025 Tr 32- 24   .... metoprolol 25.2 6/27/2025     . LACTICEMIA   .... la 6/8/2025 la 1.4     . CHF  .... pbnp 6/8/2025 pbnp 6599   .... tte 4/2025 mild ph  n vf  .... tte 6/2/2025   ........ ef 71%   ........ n rvsf    ........ pasp 54   ....... la mod dilatd   .... lasix 40/d 6/22/2025  .... torsemide 20 6/17-6/22/2025  .... lasix 40 once 6/14/2025  .... 6/22/2025 lasix 20 once     . A fib (chronic) POA 6/8/2025  .... AMIODARONE 100 6/8/2025  .... 6/10/2025  dw hemat cardio id and instead of restarting apixa will start iv UFH which can be easily reversed in case Hb starts dropping but will be the rx for venous thromboembolism as well as for a fib   .... 6/12-6/14/2025 iv ufh     HEMAT.  . DATA  .... Plt 6/8/2025 plt 153   .... inr 6/8-6/9/2025 inr 2.27- 1.63      . ANEMIA   .... Hb 6/26-8/27-6/28-6/29/2025   .... Hb 8.3 - 8.6 - 8.7 - 7.2   .... Hb 6/19-6/20-6/21-6/22-6/23-6/24/2025   .... Hb 7.8 - 9.2 - 9.3 - 8.3 - 9.2 - 8.4   .... Hb 6/8-6/9-6/10-6/11-6/13-6/14-6/15-6/17-6/18/2025   .... Hb 7.5- 7.8 - 7.6- 7.1 - 8.4 - 7.3- 9.1 - 8.1 - 8.5    . TRANSFUSION  .... 6/8  1 u prbc    .... 6/14 2 u prbc   .... 6/22/2025 1 u prbc     GI.   . DIET .   .... dash 6/8/2025     . LFT MONITORING   .... LFTS    6/8/2025  ........ AP     39  ........ AST   11  ........ ALT    35    RENAL.  . DATA  .... Na 6/8/2025 Na 137  .... K 6/8/2025 K 4   .... CO2 6/8/2025 co2 29   .... ag 6/8/2025 ag 9  .... alb 6/8/2025 alb 3.4     . KIMBERLY ON CKD   .... Cr 6/8-6/9-6/10-6/11-6/13-6/14/2025   .... Cr 1.9 - 1.4 - 1.3 - 1.3 - 1.1 - 1  .... Cr 5/27-5/28-5/29-5/30-6/1/2025   .... Cr 1.2 - 1.3 - 1.3 - 1.6 - 1.6    . KIMBERLY ON CKD  .... Cr 6/28/2025 Cr 1.4     . HYPERNATREMIA   .... Na 6/24-6/26/2025 Na 147    ENDO.  . DM  .  .... 6/8/2025 SL SCALE     NEURO.  . PAIN  .... GABAPENTIN 6/8/2025 g 400.2     XXXXXXXXXXXXXXXXXXXXXX   SUMMARY BASELINE .     84 yr old female pt of Dr Gallegos not on home ox or home cpap used to use [employer] lives with spouse quit 40 y 1/2 ppd with PMHx afib on eliquis, COPD (not on home O2), PE/Rt lower extremity DVT 2017, Rt LE IVC filter 2017 CKD3, aplastic anemia, polymyalgia rheumatica on prednisone 5 mg po qd, requiring PRBC transfusions around 8 weeks (via Rt subclavian mediport), AVN bilat hips, HTN, HLD, HFpEF (75% 6.2.25), diastolic dysfx grade1, recent hospitalization 5/25/25-6/5/25 for ADHF/COPD exacerbation, Pt with eventual improvement and transfer to [facility] facility   CC.   . 6/8/2025 R CHEST PAIN   MAIN ISSUES.  . COPD 6/23/2025 hydrocort 50.3 6/27/2025 pred 10   . HYPERCAPNIC RESP FAILURE: 6/20/2025 4p 5l 736/71/65 6/23/2025 2a avaps 450/18/8/25/10 .45 740/67/85   .... 6/20/2025 Noct bpap 15/5/35/16 ordrd 6/23 noct avaps .45 14 epap 8 vt 450 25/10   . RESP FAILURE 6/22/2025  HFNC 6/22 5p HFNC 80% 50l po 95% tr icu   . MILD HEMOPTYSOIS 6/10/2025 6/22 apixa stoppd   . PNEUMONIA RML 6/8/2025:  ZOSYN 6/8-6/16/2025   . PLEURITIS CHEST PAIN 6/10/2025: 6/10 vte excluded vlp vq   . SHOCK 6/8/2025: tr oo icu 6/9   . NOREPI 6/8-6/9/2025   . STRESS STEROIDS : HYDROCORTISONE 6/8-6/17/2025  . A fib (chronic) POA 6/8/20256/22/2025 6/22 apixa stopped   . CHF: tte 6/2/2025  ef 71%  pasp 54  la mod dilatd 6/22 lasix 40   . ANEMIA Hb 6/8-6/13-6/25/2025 Hb 7.5- 8.4- 8.5  . TRANSFUSION 6/8  1 u prbc  6/22 1 u prbc   . KIMBERLY ON CKD Cr 6/8-6/13/2025 Cr 1.9- 1.1  . HYPERNATREMIA 6/24-6/25/2025 Na 147-146     . PNEUMONIA: 6/22 ct bl ul opac 6/22-6/29/2025 zosyn   . SUSPECTED BLEEDING IN [place]I CAUSED RESP DECOMPENSATION AND NEW OPACITIES ON 6/22 CT  6/22 622 apixa stoppd     . RESP FAILURE 6/28/2025 LIKELY DUE TO NONCOMPLIANCE WITH NOCT AVAP Tr to ICU 6/28/2025 AVAPS 30% 24   . COPD HYDROCORT 6/29/2025 h 50.3   . KIMBERLY ON CKD 6/28/2025 Cr 1.4    . HYPOPHOSPHATEMIA 6/28/2025 PO4 .7     PMH.   . AFon Eliquis,   . COPD (not on home o2),   . CKD,   . aplastic anemia,   . TRANSFUSION 6/2/2025 1 u prbc   . PMR (chronic prednisone with AVN hip),   . HLD,   . HTN,   . DM    PROCEDURES/DEVICES .  . 6/16/2025 FOB br wash rul ooze rul l lo lobe post basal     PAST PROCEDURES   . r mediport poa 6/8/2025     DISCUSSIONS.  .... Discussed with primary care and relevant consultants on an ongoing basis       TIME SPENT.  . Over 36 minutes aggregate care time spent on encounter; activities included   direct patient care, counseling and/or coordinating care reviewing notes, lab data/ imaging , discussion with multidisciplinary team/ patient  /family and explaining in detail risks, benefits, alternatives  of the recommendations     ROBERT HENRIQUEZ 84 6/8/2025 1940

## 2025-06-30 NOTE — PROGRESS NOTE ADULT - ASSESSMENT
84F h/o AFib on Eliquis, COPD, CKD, aplastic anemia, polymyalgia rheumatica on chronic steroids, avascular necrosis of hips, HTN, HLD, with recent admission to [place] 5/26 - 6/5 for acute HFpEF exacerbation and COPD exacerbation, discharged to rehab on 6/5 and returning today with right sided chest pain, general malaise and feeling unwell. She reports some slight cough though otherwise no other new symptoms reported. Found to be hypotensive, febrile w/ leukocytosis. Admitted to ICU for septic shock likely 2/2 to PNA and KIMBERLY. downgraded to tele. s/p RRT 6/28 for hypothermia, hypotension and lethargy    Metabolic encephalopathy   acute on chronic hypercapnic respiratory failure    - pulm following    - on AVAPs. educated that she needs to stay on for full 8 hours.    - wean to keep pulse ox >88%     - cont advair, duoneb, hypertonic saline QID    shock secondary to adrenal insufficiency     - on stress dosed steroids     PNA/hemoptysis     - s/p bronch with blood in the RUL. AC held.     - weaned off droxidopa/midodrine. Weaning steroids as tolerated. will need a prolonged taper.     - completed zosyn 6/29.     - Pulm/ID following     Aplastic anemia    - eliquis held. patient had recurrent hemoptysis with evidence of bleeding on bronch. s/p prbc on 6/23.     KIMBERLY/CKD3, prerenal azotemia     - nephrology following     - potassium supplemented     afib     - cardio following.     - cont amiodarone 100mg daily     - BB and diuretics held    - discussed with cardio, patient has been on midodrine and florinef in the past for orthostatic hypotension.      PMR    - on chronic prednisone 5mg daily at home.     HLD    - cont statin     DVT proph: SCDs given hemoptysis  daughter updated at bedside.

## 2025-06-30 NOTE — CHART NOTE - NSCHARTNOTESELECT_GEN_ALL_CORE
Event Note
Nutrition Services
Rapid Response
Event Note
Nutrition Services
POCUS
Rapid Response

## 2025-06-30 NOTE — PROGRESS NOTE ADULT - SUBJECTIVE AND OBJECTIVE BOX
[facility]   INFECTIOUS DISEASES   [address] [place] [address]  Tel: [phone]     Fax: [phone]  =========================================================  MD Juan A Archer Michelle, MD Shah, Kaushal, MD Sunjit, Jaspal, MD Sehrish Shahid, MD   =========================================================    MARTINEZ JONES [id number]    Follow up: Patient is awake and alert sitting on a chair, no new complaint, looks NAD on o2 via NC.  No fever.     Allergies:  No Known Allergies    acetaminophen     Tablet .. 650 milliGRAM(s) Oral every 6 hours PRN  albuterol/ipratropium for Nebulization 3 milliLiter(s) Nebulizer three times a day  aMIOdarone    Tablet 100 milliGRAM(s) Oral daily  chlorhexidine 2% Cloths 1 Application(s) Topical <User Schedule>  dextrose 5%. 1000 milliLiter(s) IV Continuous <Continuous>  dextrose 5%. 1000 milliLiter(s) IV Continuous <Continuous>  dextrose 50% Injectable 12.5 Gram(s) IV Push once  dextrose 50% Injectable 25 Gram(s) IV Push once  dextrose 50% Injectable 25 Gram(s) IV Push once  dextrose Oral Gel 15 Gram(s) Oral once PRN  fluticasone propionate/ salmeterol 500-50 MICROgram(s) Diskus 1 Dose(s) Inhalation two times a day  furosemide   Injectable 40 milliGRAM(s) IV Push daily  glucagon  Injectable 1 milliGRAM(s) IntraMuscular once  hydrocortisone sodium succinate Injectable 50 milliGRAM(s) IV Push every 6 hours  melatonin 5 milliGRAM(s) Oral at bedtime  pantoprazole  Injectable 40 milliGRAM(s) IV Push daily  potassium chloride    Tablet ER 20 milliEquivalent(s) Oral every 2 hours  sodium chloride 3%  Inhalation 4 milliLiter(s) Inhalation three times a day     REVIEW OF SYSTEMS:  Currently says no to all,   As per note      Physical Exam:  Vital Signs Last 24 Hrs  T(C): 36.9 (30 Jun 2025 12:20), Max: 37 (29 Jun 2025 16:05)  T(F): 98.4 (30 Jun 2025 12:20), Max: 98.6 (29 Jun 2025 16:05)  HR: 78 (30 Jun 2025 12:20) (73 - 101)  BP: 129/75 (30 Jun 2025 12:20) (106/93 - 155/81)  BP(mean): 96 (30 Jun 2025 05:11) (65 - 122)  RR: 18 (30 Jun 2025 12:20) (12 - 22)  SpO2: 99% (30 Jun 2025 12:20) (92% - 99%)  Parameters below as of 30 Jun 2025 12:20  Patient On (Oxygen Delivery Method): nasal cannula  O2 Flow (L/min): 4  GEN: NAD  HEENT: normocephalic and atraumatic. EOMI. DERRICK.    NECK: Supple.  No lymphadenopathy   LUNGS: Clear to auscultation. R upper chest port looks fine  HEART: Regular rate and rhythm   ABDOMEN: Soft, nontender, and nondistended.    EXTREMITIES: some bruises and edema with IVs  MSK: no joint swelling  NEUROLOGIC: grossly intact.  PSYCHIATRIC: Appropriate affect .  SKIN: No rash     Labs:                        9.1    3.98  )-----------( 145      ( 30 Jun 2025 08:10 )             29.2     06-30    145  |  101  |  32[H]  ----------------------------<  134[H]  3.8   |  40[H]  |  0.97    Ca    8.0[L]      30 Jun 2025 08:10  Phos  2.7     06-30  Mg     1.7     06-30    TPro  6.5  /  Alb  3.0[L]  /  TBili  0.4  /  DBili  x   /  AST  18  /  ALT  23  /  AlkPhos  59  06-30    Urinalysis with Rflx Culture (collected 06-28-25 @ 16:20)    Culture - Urine (collected 06-28-25 @ 16:20)  Source: Urine  Final Report (06-30-25 @ 06:49):    <10,000 CFU/mL Normal Urogenital Marilee    Culture - Sputum (collected 06-22-25 @ 15:00)  Source: Sputum Sputum  Gram Stain (06-25-25 @ 07:59):    Few polymorphonuclear leukocytes per low power field    Few Squamous epithelial cells per low power field    No organisms seen per oil power field  Final Report (06-26-25 @ 07:05):    Commensal marilee consistent with body site    Culture - Bronchial (collected 06-16-25 @ 13:09)  Source: Bronch Wash Bronchial Wash  Gram Stain (06-16-25 @ 21:58):    Rare Squamous epithelial cells seen per low power field    No polymorphonuclear leukocytes seen per low power field    No organisms seen per oil power field  Final Report (06-18-25 @ 10:28):    Commensal marilee consistent with body site    Culture - Viral, General (collected 06-16-25 @ 12:00)    Culture - Acid Fast - Bronchial w/Smear (collected 06-16-25 @ 11:55)  Source: Bronch Wash Bronchial Wash  Preliminary Report (06-25-25 @ 23:06):    No acid-fast bacilli isolated at 1 week. ****Acid-fast cultures are held    for 6 weeks.****    Culture - Fungal, Bronchial (collected 06-16-25 @ 11:55)  Source: Bronchial Bronchial Wash  Preliminary Report (06-25-25 @ 23:02):    No fungus isolated at 1 week.    Culture - Legionella (collected 06-16-25 @ 11:55)  Source: Legionella  Final Report (06-24-25 @ 15:09):    No Legionella species isolated    Culture - Bronchial (collected 06-16-25 @ 11:53)  Source: Bronch Wash Bronchial Wash  Gram Stain (06-17-25 @ 00:37):    Rare polymorphonuclear leukocytes per oil power field    No Squamous epithelial cells per oil power field    No organisms seen per oil power field  Final Report (06-18-25 @ 08:08):    Commensal marilee consistent with body site    Culture - Sputum (collected 06-08-25 @ 18:25)  Source: Sputum Sputum  Gram Stain (06-08-25 @ 22:12):    Few Squamous epithelial cells per low power field    Few polymorphonuclear leukocytes per low power field    Few Gram Positive Cocci in Pairs and Chains per oil power field  Final Report (06-10-25 @ 15:22):    Commensal marilee consistent with body site    Urinalysis with Rflx Culture (collected 06-08-25 @ 15:44)    Culture - Urine (collected 06-08-25 @ 15:44)  Source: Clean Catch  Final Report (06-09-25 @ 16:05):    <10,000 CFU/mL Normal Urogenital Marilee    Culture - Blood (collected 06-08-25 @ 11:20)  Source: Blood Blood-Peripheral  Final Report (06-13-25 @ 14:00):    No growth at 5 days    Culture - Blood (collected 06-08-25 @ 11:15)  Source: Blood Blood-Peripheral  Final Report (06-13-25 @ 14:00):    No growth at 5 days    Culture - Blood (collected 09-02-23 @ 11:05)  Source: .Blood Blood  Final Report (09-07-23 @ 16:01):    No growth at 5 days    Culture - Blood (collected 09-02-23 @ 11:00)  Source: .Blood Blood  Final Report (09-07-23 @ 16:01):    No growth at 5 days    WBC Count: 3.98 K/uL (06-30-25 @ 08:10)  WBC Count: 2.55 K/uL (06-29-25 @ 06:25)  WBC Count: 9.71 K/uL (06-28-25 @ 06:20)  WBC Count: 6.70 K/uL (06-27-25 @ 08:20)  WBC Count: 4.89 K/uL (06-26-25 @ 05:45)    Creatinine: 0.97 mg/dL (06-30-25 @ 08:10)  Creatinine: 1.20 mg/dL (06-29-25 @ 06:25)  Creatinine: 1.40 mg/dL (06-28-25 @ 06:20)  Creatinine: 1.20 mg/dL (06-27-25 @ 08:20)  Creatinine: 0.95 mg/dL (06-26-25 @ 05:45)    Procalcitonin: 0.23 ng/mL (06-29-25 @ 06:25)     SARS-CoV-2 Result: NotDetec (06-08-25 @ 11:20)    All imaging and data are reviewed.   < from: Xray Chest 1 View- PORTABLE-Routine (Xray Chest 1 View- PORTABLE-Routine in AM.) (06.26.25 @ 07:21) >    IMPRESSION: Fairlyadvanced bilateral infiltrates may be slightly   improved on this study.      Assessment and Plan:   84-year-old female admitted on June 8 with right-sided chest pain generalized malaise lethargy  Past medical history: afib on eliquis, COPD (not on home O2), PE/Rt lower extremity DVT 2017, Rt LE IVC filter 2017 CKD3, aplastic anemia, polymyalgia rheumatica on prednisone 5 mg po qd, requiring PRBC transfusions ~8 weeks (via Rt subclavian mediport), AVN bilat hips, HTN, HLD, HFpEF (75% 6.2.25), diastolic dysfx grade1, recent hospitalization 5/25/25-6/5/25 for AHDF/COPD exacerbation, IVC filter right subclavian Mediport  Admitted for  Acute hypoxic hypercapnic respiratory failure  Hemoptysis  Multifocal pneumonia  Anemia    Cultures:  Blood culture: June 8: 2 sets: Negative to date  RSV; Influenza A, B; SARS CoV-2 Viral PCR: Negative on admission   Urine culture: June 8: Negative to date  Sputum culture: June 8: Negative to date   bronchoscopy culture: June 16: Negative to date, Legionella negative as well, PJP PCR negative  Sputum culture: June 22: Negative to date   MRSA PCR: Negative: June 23   Legionella urinary antigen negative from June 9 and 24 as well as bronchoscopy  Streptococcal urinary antigen negative from June 9 and 24    Antibiotics:   Zosyn 6/8 to 6/16   Zosyn 6/22 to 6/29    Has been completed treatment for pneumonia. She had RRT on 6/28 for AMS and hypotension, looks like she is stable now answering questions has been discharged from ICU to medical floor. R port looks fine, will monitor.  No fever, WBC almost normal 3.98 today. No complaint, able to eat and tolerate food, non diarrhea. Renal function back to normal. Never had positive cultures since admission.     Plans;  - Will monitor TMax and WBC  - Will watch off antibiotics for now   - Hematology follow up   - Rest of the care as per primary team     Will sign off please call with any question.     Sea Donnelly MD  Division of Infectious Diseases   Please call ID service at [phone] with any question.      50 minutes spent on total encounter assessing patient, examination, chart review, counseling and coordinating care by the attending physician/nurse/care manager.

## 2025-06-30 NOTE — PROGRESS NOTE ADULT - ASSESSMENT
84 female PMH of A-fib on Eliquis, COPD, CKD, aplastic anemia, polymyalgia rheumatica, on chronic steroids, avascular necrosis of hips, HLD, HTN, DM, recent admission to [facility] 5/26 through 6/5/2025 for CHF/fluid overload/COPD exacerbation, presents to the ED form [facility] for evaluation of fever and generalized feeling of being unwell, found to be septic and hypotensive.       baseline o2-dep copd  adm with pna  s/p bronch for hemoptysis, oozing from rul but no lesion found  had been on 1e but developed hypothermia, hypotension, unresponsiveness and hypercarbic resp failure  she has not been wearing her bipap, and has been told that this is likely to result in recurrent episodes of severe hypercarbic resp failure    Acute hypercarbic resp failure, metabolic encephalopathy , PNA, anemia , KIMBERLY on CKD   sp ICU now on nc on tele floor   normal lvef with lvh and mod-sev pulm htn, no need to repeat at this time  has had degree of vol ol at times, now appears euvolemic, though pocus in the icu suggests degree of vol ol given incr ivc  started on iv lasix   strict intake and output    no acute ischemia   EKG Sb with PACs prolonged qt     bp had been stable off Midodrine and Northera, and off steroids, noting addl hx of dysautonomia  on stress dose steroids   sp pressors in ICU    paf and vte was on ac, now off 2/2 hemoptysis   to remains off for now as per pulm given recurrent bleeding   episodes pat, cont amio and bb.    KIMBERLY on CKD followed by renal     Monitor and replete electrolytes. Keep K>4.0 and Mg>2.0.

## 2025-06-30 NOTE — PROGRESS NOTE ADULT - SUBJECTIVE AND OBJECTIVE BOX
CHIEF COMPLAINT/ REASON FOR VISIT  .. Patient was seen to address the  issue listed under PROBLEM LIST which is located toward bottom of this note     MARTINEZ PATEL TELN 332 D1    Allergies    No Known Allergies    Intolerances        PAST MEDICAL & SURGICAL HISTORY:  COPD (chronic obstructive pulmonary disease)      DM (diabetes mellitus)  diet-controlled      PAF (paroxysmal atrial fibrillation)  s/p ablation      Hiatal hernia      H/O aplastic anemia      History of IBS      H/O osteoporosis      HLD (hyperlipidemia)      PMR (polymyalgia rheumatica)      History of basal cell cancer      Chronic kidney disease (CKD)      Hypotension      Presence of IVC filter      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      History of immunodeficiency      Lumbar compression fracture      H/O hiatal hernia      H/O pulmonary fibrosis      H/O sinus bradycardia      History of fracture of right hip  "unoperable"      S/P hysterectomy      S/P foot surgery      S/P knee surgery      After cataract  bilateral eye cataract surgically removed      Closed right hip fracture  rigth hip ORIF july 19 2016      S/P IVC filter  nov 2016      S/P carpal tunnel release  Right 2008 & 6/27/17      H/O kyphoplasty      Port-A-Cath in place  right chest          FAMILY HISTORY:  Family history of bladder cancer (Mother)    Family history of myocardial infarction (Father)    FH: atrial fibrillation (Father)        Home Medications:  amiodarone 200 mg oral tablet: 0.5 tab(s) orally once a day (08 Jun 2025 16:43)  Bentyl 10 mg oral capsule: 1 cap(s) orally 2 times a day, As Needed (08 Jun 2025 16:43)  Eliquis 2.5 mg oral tablet: 1 tab(s) orally 2 times a day (08 Jun 2025 16:43)  fluticasone-salmeterol 500 mcg-50 mcg/inh inhalation powder: 1 puff(s) inhaled 2 times a day (08 Jun 2025 16:43)  folic acid 1 mg oral tablet: 1 tab(s) orally once a day (in the morning) (08 Jun 2025 16:43)  gabapentin 400 mg oral capsule: 1 cap(s) orally 2 times a day (08 Jun 2025 16:43)  ipratropium-albuterol 0.5 mg-2.5 mg/3 mL inhalation solution: 3 milliliter(s) inhaled every 6 hours As needed Shortness of Breath and/or Wheezing (08 Jun 2025 16:43)  IVIG monthly:  (08 Jun 2025 16:43)  leflunomide 10 mg oral tablet: 1 tab(s) orally once a day (08 Jun 2025 16:43)  midodrine 10 mg oral tablet: 1 tab(s) orally every 8 hours (08 Jun 2025 16:43)  montelukast 10 mg oral tablet: 1 tab(s) orally once a day (08 Jun 2025 16:43)  pantoprazole 40 mg oral delayed release tablet: 1 tab(s) orally once a day (before a meal) (08 Jun 2025 16:43)  predniSONE 5 mg oral tablet: 1 tab(s) orally once a day (08 Jun 2025 16:43)  Procrit 10,000 units/mL preservative-free injectable solution: injectable once a week WEDNESDAY (08 Jun 2025 16:43)  Reclast Infusion , IV x 1 yearly:  (08 Jun 2025 16:43)  simvastatin 10 mg oral tablet: 1 tab(s) orally once a day (at bedtime) (08 Jun 2025 16:43)  tiotropium 2.5 mcg/inh inhalation aerosol: 2 puff(s) inhaled once a day (08 Jun 2025 16:43)  tiZANidine: 2 tab(s) orally once a day (at bedtime) as needed for  muscle spasm (08 Jun 2025 16:43)  torsemide 20 mg oral tablet: 1 tab(s) orally once a day (in the morning) (08 Jun 2025 16:49)      MEDICATIONS  (STANDING):  albuterol/ipratropium for Nebulization 3 milliLiter(s) Nebulizer three times a day  aMIOdarone    Tablet 100 milliGRAM(s) Oral daily  chlorhexidine 2% Cloths 1 Application(s) Topical <User Schedule>  dextrose 5%. 1000 milliLiter(s) (50 mL/Hr) IV Continuous <Continuous>  dextrose 5%. 1000 milliLiter(s) (100 mL/Hr) IV Continuous <Continuous>  dextrose 50% Injectable 12.5 Gram(s) IV Push once  dextrose 50% Injectable 25 Gram(s) IV Push once  dextrose 50% Injectable 25 Gram(s) IV Push once  fluticasone propionate/ salmeterol 500-50 MICROgram(s) Diskus 1 Dose(s) Inhalation two times a day  furosemide   Injectable 40 milliGRAM(s) IV Push daily  glucagon  Injectable 1 milliGRAM(s) IntraMuscular once  hydrocortisone sodium succinate Injectable 50 milliGRAM(s) IV Push every 8 hours  melatonin 5 milliGRAM(s) Oral at bedtime  pantoprazole  Injectable 40 milliGRAM(s) IV Push daily  sodium chloride 3%  Inhalation 4 milliLiter(s) Inhalation three times a day    MEDICATIONS  (PRN):  acetaminophen     Tablet .. 650 milliGRAM(s) Oral every 6 hours PRN Mild Pain (1 - 3)  dextrose Oral Gel 15 Gram(s) Oral once PRN Blood Glucose LESS THAN 70 milliGRAM(s)/deciliter      Diet, Regular:   DASH/TLC Sodium & Cholesterol Restricted (06-23-25 @ 13:52) [Active]          Vital Signs Last 24 Hrs  T(C): 36.6 (30 Jun 2025 05:11), Max: 37.3 (29 Jun 2025 12:30)  T(F): 97.9 (30 Jun 2025 05:11), Max: 99.2 (29 Jun 2025 12:30)  HR: 79 (30 Jun 2025 05:11) (69 - 106)  BP: 155/81 (30 Jun 2025 05:11) (106/93 - 155/81)  BP(mean): 96 (30 Jun 2025 05:11) (65 - 122)  RR: 18 (30 Jun 2025 05:11) (12 - 22)  SpO2: 96% (30 Jun 2025 05:11) (92% - 100%)    Parameters below as of 30 Jun 2025 05:11  Patient On (Oxygen Delivery Method): BiPAP/CPAP          06-28-25 @ 07:01  -  06-29-25 @ 07:00  --------------------------------------------------------  IN: 898.4 mL / OUT: 500 mL / NET: 398.4 mL    06-29-25 @ 07:01  -  06-30-25 @ 05:26  --------------------------------------------------------  IN: 25 mL / OUT: 1000 mL / NET: -975 mL              LABS:                        7.2    2.55  )-----------( Clumped    ( 29 Jun 2025 06:25 )             22.9     06-29    143  |  99  |  41[H]  ----------------------------<  135[H]  3.1[L]   |  39[H]  |  1.20    Ca    8.0[L]      29 Jun 2025 06:25  Phos  4.4     06-29  Mg     1.6     06-29    TPro  5.6[L]  /  Alb  2.6[L]  /  TBili  0.4  /  DBili  x   /  AST  7[L]  /  ALT  17  /  AlkPhos  57  06-29      Urinalysis Basic - ( 29 Jun 2025 06:25 )    Color: x / Appearance: x / SG: x / pH: x  Gluc: 135 mg/dL / Ketone: x  / Bili: x / Urobili: x   Blood: x / Protein: x / Nitrite: x   Leuk Esterase: x / RBC: x / WBC x   Sq Epi: x / Non Sq Epi: x / Bacteria: x        ABG - ( 28 Jun 2025 21:40 )  pH, Arterial: 7.43  pH, Blood: x     /  pCO2: 61    /  pO2: 82    / HCO3: 40    / Base Excess: 16.2  /  SaO2: 98.5                WBC:  WBC Count: 2.55 K/uL (06-29 @ 06:25)  WBC Count: 9.71 K/uL (06-28 @ 06:20)  WBC Count: 6.70 K/uL (06-27 @ 08:20)  WBC Count: 4.89 K/uL (06-26 @ 05:45)      MICROBIOLOGY:  RECENT CULTURES:                  Sodium:  Sodium: 143 mmol/L (06-29 @ 06:25)  Sodium: 138 mmol/L (06-28 @ 06:20)  Sodium: 143 mmol/L (06-27 @ 08:20)  Sodium: 146 mmol/L (06-26 @ 05:45)      1.20 mg/dL 06-29 @ 06:25  1.40 mg/dL 06-28 @ 06:20  1.20 mg/dL 06-27 @ 08:20  0.95 mg/dL 06-26 @ 05:45      Hemoglobin:  Hemoglobin: 7.2 g/dL (06-29 @ 06:25)  Hemoglobin: 8.7 g/dL (06-28 @ 06:20)  Hemoglobin: 8.6 g/dL (06-27 @ 08:20)  Hemoglobin: 8.3 g/dL (06-26 @ 05:45)      Platelets: Platelet Count - Automated: Clumped (06-29 @ 06:25)  Platelet Count - Automated: Clumped K/uL (06-28 @ 06:20)  Platelet Count - Automated: 178 K/uL (06-27 @ 08:20)  Platelet Count - Automated: 176 K/uL (06-26 @ 05:45)      LIVER FUNCTIONS - ( 29 Jun 2025 06:25 )  Alb: 2.6 g/dL / Pro: 5.6 g/dL / ALK PHOS: 57 U/L / ALT: 17 U/L / AST: 7 U/L / GGT: x             Urinalysis Basic - ( 29 Jun 2025 06:25 )    Color: x / Appearance: x / SG: x / pH: x  Gluc: 135 mg/dL / Ketone: x  / Bili: x / Urobili: x   Blood: x / Protein: x / Nitrite: x   Leuk Esterase: x / RBC: x / WBC x   Sq Epi: x / Non Sq Epi: x / Bacteria: x        RADIOLOGY & ADDITIONAL STUDIES:      MICROBIOLOGY:  RECENT CULTURES:

## 2025-06-30 NOTE — PROGRESS NOTE ADULT - SUBJECTIVE AND OBJECTIVE BOX
Patient is a 84y old  Female who presents with a chief complaint of septic shock, PNA (30 Jun 2025 14:18)    INTERVAL HPI/OVERNIGHT EVENTS: Patient was seen and examined. on NC. sitting in chair. Daughter at bedside.     I&O's Summary    29 Jun 2025 07:01  -  30 Jun 2025 07:00  --------------------------------------------------------  IN: 25 mL / OUT: 1000 mL / NET: -975 mL    30 Jun 2025 07:01  -  30 Jun 2025 14:57  --------------------------------------------------------  IN: 450 mL / OUT: 500 mL / NET: -50 mL        LABS:                        9.1    3.98  )-----------( 145      ( 30 Jun 2025 08:10 )             29.2     06-30    145  |  101  |  32[H]  ----------------------------<  134[H]  3.8   |  40[H]  |  0.97    Ca    8.0[L]      30 Jun 2025 08:10  Phos  2.7     06-30  Mg     1.7     06-30    TPro  6.5  /  Alb  3.0[L]  /  TBili  0.4  /  DBili  x   /  AST  18  /  ALT  23  /  AlkPhos  59  06-30      Urinalysis Basic - ( 30 Jun 2025 08:10 )    Color: x / Appearance: x / SG: x / pH: x  Gluc: 134 mg/dL / Ketone: x  / Bili: x / Urobili: x   Blood: x / Protein: x / Nitrite: x   Leuk Esterase: x / RBC: x / WBC x   Sq Epi: x / Non Sq Epi: x / Bacteria: x      CAPILLARY BLOOD GLUCOSE      POCT Blood Glucose.: 159 mg/dL (30 Jun 2025 12:32)  POCT Blood Glucose.: 150 mg/dL (30 Jun 2025 08:43)  POCT Blood Glucose.: 124 mg/dL (29 Jun 2025 21:10)  POCT Blood Glucose.: 138 mg/dL (29 Jun 2025 17:14)    ABG - ( 28 Jun 2025 21:40 )  pH, Arterial: 7.43  pH, Blood: x     /  pCO2: 61    /  pO2: 82    / HCO3: 40    / Base Excess: 16.2  /  SaO2: 98.5      Urinalysis Basic - ( 30 Jun 2025 08:10 )    Color: x / Appearance: x / SG: x / pH: x  Gluc: 134 mg/dL / Ketone: x  / Bili: x / Urobili: x   Blood: x / Protein: x / Nitrite: x   Leuk Esterase: x / RBC: x / WBC x   Sq Epi: x / Non Sq Epi: x / Bacteria: x    MEDICATIONS  (STANDING):  albuterol/ipratropium for Nebulization 3 milliLiter(s) Nebulizer three times a day  aMIOdarone    Tablet 100 milliGRAM(s) Oral daily  chlorhexidine 2% Cloths 1 Application(s) Topical <User Schedule>  dextrose 5%. 1000 milliLiter(s) (50 mL/Hr) IV Continuous <Continuous>  dextrose 5%. 1000 milliLiter(s) (100 mL/Hr) IV Continuous <Continuous>  dextrose 50% Injectable 12.5 Gram(s) IV Push once  dextrose 50% Injectable 25 Gram(s) IV Push once  dextrose 50% Injectable 25 Gram(s) IV Push once  fluticasone propionate/ salmeterol 500-50 MICROgram(s) Diskus 1 Dose(s) Inhalation two times a day  furosemide   Injectable 40 milliGRAM(s) IV Push daily  glucagon  Injectable 1 milliGRAM(s) IntraMuscular once  hydrocortisone sodium succinate Injectable 50 milliGRAM(s) IV Push every 8 hours  insulin lispro (ADMELOG) corrective regimen sliding scale   SubCutaneous three times a day before meals  insulin lispro (ADMELOG) corrective regimen sliding scale   SubCutaneous at bedtime  melatonin 5 milliGRAM(s) Oral at bedtime  pantoprazole  Injectable 40 milliGRAM(s) IV Push daily  sodium chloride 3%  Inhalation 4 milliLiter(s) Inhalation three times a day    MEDICATIONS  (PRN):  acetaminophen     Tablet .. 650 milliGRAM(s) Oral every 6 hours PRN Mild Pain (1 - 3)  dextrose Oral Gel 15 Gram(s) Oral once PRN Blood Glucose LESS THAN 70 milliGRAM(s)/deciliter      REVIEW OF SYSTEMS:  CONSTITUTIONAL: No fever or chills  HEENT:  No headache  RESPIRATORY: No cough or shortness of breath  CARDIOVASCULAR: No chest pain  GASTROINTESTINAL: No abdominal pain, nausea, vomiting, or diarrhea  GENITOURINARY: No dysuria  NEUROLOGICAL: no focal weakness   MUSCULOSKELETAL: no myalgias  PSYCH: no recent changes in mood    RADIOLOGY & ADDITIONAL TESTS:    Imaging Personally Reviewed:  [x] YES  [ ] NO    Consultant(s) Notes Reviewed:  [x] YES  [ ] NO    PHYSICAL EXAM:  T(C): 36.9 (06-30-25 @ 12:20), Max: 37 (06-29-25 @ 16:05)  HR: 78 (06-30-25 @ 12:20) (73 - 101)  BP: 129/75 (06-30-25 @ 12:20) (106/93 - 155/81)  RR: 18 (06-30-25 @ 12:20) (12 - 22)  SpO2: 99% (06-30-25 @ 12:20) (92% - 99%)    GENERAL: awake, oriented   HEENT:  anicteric, moist mucous membranes  CHEST/LUNG:  CTA b/l, no rales, wheezes, or rhonchi  HEART:  RRR, S1, S2  ABDOMEN:  BS+, soft, nontender, nondistended  EXTREMITIES: + edema  NERVOUS SYSTEM: lethargic     Care Discussed with Consultants/Other Providers [x] YES  [ ] NO Patient is a 84y old  Female who presents with a chief complaint of septic shock, PNA (30 Jun 2025 14:18)    INTERVAL HPI/OVERNIGHT EVENTS: Patient was seen and examined. on NC. sitting in chair. Daughter at bedside.     I&O's Summary    29 Jun 2025 07:01  -  30 Jun 2025 07:00  --------------------------------------------------------  IN: 25 mL / OUT: 1000 mL / NET: -975 mL    30 Jun 2025 07:01  -  30 Jun 2025 14:57  --------------------------------------------------------  IN: 450 mL / OUT: 500 mL / NET: -50 mL        LABS:                        9.1    3.98  )-----------( 145      ( 30 Jun 2025 08:10 )             29.2     06-30    145  |  101  |  32[H]  ----------------------------<  134[H]  3.8   |  40[H]  |  0.97    Ca    8.0[L]      30 Jun 2025 08:10  Phos  2.7     06-30  Mg     1.7     06-30    TPro  6.5  /  Alb  3.0[L]  /  TBili  0.4  /  DBili  x   /  AST  18  /  ALT  23  /  AlkPhos  59  06-30      Urinalysis Basic - ( 30 Jun 2025 08:10 )    Color: x / Appearance: x / SG: x / pH: x  Gluc: 134 mg/dL / Ketone: x  / Bili: x / Urobili: x   Blood: x / Protein: x / Nitrite: x   Leuk Esterase: x / RBC: x / WBC x   Sq Epi: x / Non Sq Epi: x / Bacteria: x      CAPILLARY BLOOD GLUCOSE      POCT Blood Glucose.: 159 mg/dL (30 Jun 2025 12:32)  POCT Blood Glucose.: 150 mg/dL (30 Jun 2025 08:43)  POCT Blood Glucose.: 124 mg/dL (29 Jun 2025 21:10)  POCT Blood Glucose.: 138 mg/dL (29 Jun 2025 17:14)    ABG - ( 28 Jun 2025 21:40 )  pH, Arterial: 7.43  pH, Blood: x     /  pCO2: 61    /  pO2: 82    / HCO3: 40    / Base Excess: 16.2  /  SaO2: 98.5      Urinalysis Basic - ( 30 Jun 2025 08:10 )    Color: x / Appearance: x / SG: x / pH: x  Gluc: 134 mg/dL / Ketone: x  / Bili: x / Urobili: x   Blood: x / Protein: x / Nitrite: x   Leuk Esterase: x / RBC: x / WBC x   Sq Epi: x / Non Sq Epi: x / Bacteria: x    MEDICATIONS  (STANDING):  albuterol/ipratropium for Nebulization 3 milliLiter(s) Nebulizer three times a day  aMIOdarone    Tablet 100 milliGRAM(s) Oral daily  chlorhexidine 2% Cloths 1 Application(s) Topical <User Schedule>  dextrose 5%. 1000 milliLiter(s) (50 mL/Hr) IV Continuous <Continuous>  dextrose 5%. 1000 milliLiter(s) (100 mL/Hr) IV Continuous <Continuous>  dextrose 50% Injectable 12.5 Gram(s) IV Push once  dextrose 50% Injectable 25 Gram(s) IV Push once  dextrose 50% Injectable 25 Gram(s) IV Push once  fluticasone propionate/ salmeterol 500-50 MICROgram(s) Diskus 1 Dose(s) Inhalation two times a day  furosemide   Injectable 40 milliGRAM(s) IV Push daily  glucagon  Injectable 1 milliGRAM(s) IntraMuscular once  hydrocortisone sodium succinate Injectable 50 milliGRAM(s) IV Push every 8 hours  insulin lispro (ADMELOG) corrective regimen sliding scale   SubCutaneous three times a day before meals  insulin lispro (ADMELOG) corrective regimen sliding scale   SubCutaneous at bedtime  melatonin 5 milliGRAM(s) Oral at bedtime  pantoprazole  Injectable 40 milliGRAM(s) IV Push daily  sodium chloride 3%  Inhalation 4 milliLiter(s) Inhalation three times a day    MEDICATIONS  (PRN):  acetaminophen     Tablet .. 650 milliGRAM(s) Oral every 6 hours PRN Mild Pain (1 - 3)  dextrose Oral Gel 15 Gram(s) Oral once PRN Blood Glucose LESS THAN 70 milliGRAM(s)/deciliter      REVIEW OF SYSTEMS:  CONSTITUTIONAL: No fever or chills  HEENT:  No headache  RESPIRATORY: No cough or shortness of breath  CARDIOVASCULAR: No chest pain  GASTROINTESTINAL: No abdominal pain, nausea, vomiting, or diarrhea  GENITOURINARY: No dysuria  NEUROLOGICAL: no focal weakness   MUSCULOSKELETAL: no myalgias  PSYCH: no recent changes in mood    RADIOLOGY & ADDITIONAL TESTS:    Imaging Personally Reviewed:  [x] YES  [ ] NO    Consultant(s) Notes Reviewed:  [x] YES  [ ] NO    PHYSICAL EXAM:  T(C): 36.9 (06-30-25 @ 12:20), Max: 37 (06-29-25 @ 16:05)  HR: 78 (06-30-25 @ 12:20) (73 - 101)  BP: 129/75 (06-30-25 @ 12:20) (106/93 - 155/81)  RR: 18 (06-30-25 @ 12:20) (12 - 22)  SpO2: 99% (06-30-25 @ 12:20) (92% - 99%)    GENERAL: awake, oriented   HEENT:  anicteric, moist mucous membranes  CHEST/LUNG:  CTA b/l, no rales, wheezes, or rhonchi  HEART:  RRR, S1, S2  ABDOMEN:  BS+, soft, nontender, nondistended  EXTREMITIES: + edema  NERVOUS SYSTEM: awake     Care Discussed with Consultants/Other Providers [x] YES  [ ] NO

## 2025-06-30 NOTE — PROGRESS NOTE ADULT - ASSESSMENT
Prerenal azotemia, CKD 3  Dyspnea: Pneumonia, h/o COPD/CHF  s/p Shock   Diabetes  Aplastic anemia, requiring frequent blood transfusions  Hemoptysis    Stable renal indices. H/H better. To continue current meds. Monitor blood sugar levels. Insulin coverage as needed. Trend BP and titrate BP meds as needed.   Avoid nephrotoxic meds as possible. Pulmonary follow up. Will follow electrolytes and renal function trend. D/w pt's daughter at bedside.

## 2025-07-01 DIAGNOSIS — E27.40 UNSPECIFIED ADRENOCORTICAL INSUFFICIENCY: ICD-10-CM

## 2025-07-01 DIAGNOSIS — E27.49 OTHER ADRENOCORTICAL INSUFFICIENCY: ICD-10-CM

## 2025-07-01 LAB
ALBUMIN SERPL ELPH-MCNC: 3 G/DL — LOW (ref 3.3–5)
ALP SERPL-CCNC: 63 U/L — SIGNIFICANT CHANGE UP (ref 40–120)
ALT FLD-CCNC: 27 U/L — SIGNIFICANT CHANGE UP (ref 12–78)
ANION GAP SERPL CALC-SCNC: 4 MMOL/L — LOW (ref 5–17)
AST SERPL-CCNC: 30 U/L — SIGNIFICANT CHANGE UP (ref 15–37)
BILIRUB SERPL-MCNC: 0.4 MG/DL — SIGNIFICANT CHANGE UP (ref 0.2–1.2)
BUN SERPL-MCNC: 32 MG/DL — HIGH (ref 7–23)
CALCIUM SERPL-MCNC: 8.1 MG/DL — LOW (ref 8.5–10.1)
CHLORIDE SERPL-SCNC: 100 MMOL/L — SIGNIFICANT CHANGE UP (ref 96–108)
CO2 SERPL-SCNC: 41 MMOL/L — HIGH (ref 22–31)
CREAT SERPL-MCNC: 0.8 MG/DL — SIGNIFICANT CHANGE UP (ref 0.5–1.3)
EGFR: 74 ML/MIN/1.73M2 — SIGNIFICANT CHANGE UP
EGFR: 74 ML/MIN/1.73M2 — SIGNIFICANT CHANGE UP
GLUCOSE SERPL-MCNC: 117 MG/DL — HIGH (ref 70–99)
HCT VFR BLD CALC: 26.1 % — LOW (ref 34.5–45)
HGB BLD-MCNC: 8.3 G/DL — LOW (ref 11.5–15.5)
MCHC RBC-ENTMCNC: 30.1 PG — SIGNIFICANT CHANGE UP (ref 27–34)
MCHC RBC-ENTMCNC: 31.8 G/DL — LOW (ref 32–36)
MCV RBC AUTO: 94.6 FL — SIGNIFICANT CHANGE UP (ref 80–100)
NRBC # BLD AUTO: 0 K/UL — SIGNIFICANT CHANGE UP (ref 0–0)
NRBC # FLD: 0 K/UL — SIGNIFICANT CHANGE UP (ref 0–0)
NRBC BLD AUTO-RTO: 0 /100 WBCS — SIGNIFICANT CHANGE UP (ref 0–0)
PLATELET # BLD AUTO: 187 K/UL — SIGNIFICANT CHANGE UP (ref 150–400)
PMV BLD: 11.7 FL — SIGNIFICANT CHANGE UP (ref 7–13)
POTASSIUM SERPL-MCNC: 3.5 MMOL/L — SIGNIFICANT CHANGE UP (ref 3.5–5.3)
POTASSIUM SERPL-SCNC: 3.5 MMOL/L — SIGNIFICANT CHANGE UP (ref 3.5–5.3)
PROT SERPL-MCNC: 6.1 G/DL — SIGNIFICANT CHANGE UP (ref 6–8.3)
RBC # BLD: 2.76 M/UL — LOW (ref 3.8–5.2)
RBC # FLD: 17.2 % — HIGH (ref 10.3–14.5)
SODIUM SERPL-SCNC: 145 MMOL/L — SIGNIFICANT CHANGE UP (ref 135–145)
WBC # BLD: 2.5 K/UL — LOW (ref 3.8–10.5)
WBC # FLD AUTO: 2.5 K/UL — LOW (ref 3.8–10.5)

## 2025-07-01 PROCEDURE — 99233 SBSQ HOSP IP/OBS HIGH 50: CPT

## 2025-07-01 PROCEDURE — 99232 SBSQ HOSP IP/OBS MODERATE 35: CPT

## 2025-07-01 RX ADMIN — Medication 1 APPLICATION(S): at 07:41

## 2025-07-01 RX ADMIN — IPRATROPIUM BROMIDE AND ALBUTEROL SULFATE 3 MILLILITER(S): .5; 2.5 SOLUTION RESPIRATORY (INHALATION) at 13:27

## 2025-07-01 RX ADMIN — Medication 4 MILLILITER(S): at 20:06

## 2025-07-01 RX ADMIN — Medication 4 MILLILITER(S): at 08:09

## 2025-07-01 RX ADMIN — Medication 5 MILLIGRAM(S): at 21:21

## 2025-07-01 RX ADMIN — FUROSEMIDE 40 MILLIGRAM(S): 10 INJECTION INTRAMUSCULAR; INTRAVENOUS at 05:36

## 2025-07-01 RX ADMIN — Medication 25 MILLIGRAM(S): at 05:36

## 2025-07-01 RX ADMIN — AMIODARONE HYDROCHLORIDE 100 MILLIGRAM(S): 50 INJECTION, SOLUTION INTRAVENOUS at 05:37

## 2025-07-01 RX ADMIN — Medication 4 MILLILITER(S): at 13:35

## 2025-07-01 RX ADMIN — Medication 40 MILLIGRAM(S): at 12:38

## 2025-07-01 RX ADMIN — Medication 1 DOSE(S): at 21:22

## 2025-07-01 RX ADMIN — Medication 25 MILLIGRAM(S): at 17:17

## 2025-07-01 RX ADMIN — Medication 20 MILLIEQUIVALENT(S): at 17:17

## 2025-07-01 RX ADMIN — IPRATROPIUM BROMIDE AND ALBUTEROL SULFATE 3 MILLILITER(S): .5; 2.5 SOLUTION RESPIRATORY (INHALATION) at 08:09

## 2025-07-01 RX ADMIN — IPRATROPIUM BROMIDE AND ALBUTEROL SULFATE 3 MILLILITER(S): .5; 2.5 SOLUTION RESPIRATORY (INHALATION) at 20:06

## 2025-07-01 NOTE — PROGRESS NOTE ADULT - PROBLEM SELECTOR PLAN 6
afib     - cardio following.     - cont amiodarone 100mg daily     - BB and diuretics held    - discussed with cardio, patient has been on midodrine and florinef in the past for orthostatic hypotension. afib     - cardio following.     - cont amiodarone 100mg daily     - Lasix 40 mg IV started, per cardio, may switch to PO. Leg edema improving    - BB held    - discussed with cardio, patient has been on midodrine and florinef in the past for orthostatic hypotension.

## 2025-07-01 NOTE — PROGRESS NOTE ADULT - PROBLEM SELECTOR PLAN 10
Hx of T2DM, but not on home meds.   - on insulin sliding scale  - blood glucose mostly within hospital targets (140-180)

## 2025-07-01 NOTE — PROGRESS NOTE ADULT - PROBLEM SELECTOR PLAN 7
Aplastic anemia    - eliquis held. patient had recurrent hemoptysis with evidence of bleeding on bronch. s/p prbc on 6/23.

## 2025-07-01 NOTE — PROGRESS NOTE ADULT - PROBLEM SELECTOR PLAN 2
shock secondary to adrenal insufficiency     - on stress dosed steroids. Weaning steroids as tolerated, likely prolonged taper. Today, patient is on hydrocortisone 25mg IV q12hr. shock secondary to adrenal insufficiency. Patient on chronic prednisone for PMR.     - on stress dosed steroids. Weaning steroids as tolerated, likely prolonged taper. Today, patient is on hydrocortisone 25mg IV q12hr.

## 2025-07-01 NOTE — PROGRESS NOTE ADULT - PROBLEM SELECTOR PLAN 8
PMR    - on chronic prednisone 5mg daily at home.    - On stress doses of steroids, currently tapering. Currently on hydrocortisone 25mg IV q12hr

## 2025-07-01 NOTE — CONSULT NOTE ADULT - REASON FOR ADMISSION
septic shock, PNA

## 2025-07-01 NOTE — CONSULT NOTE ADULT - CONSULT REASON
pneumonia
Septic Shock
pneu
adrenal insuff due to chronic glucocorticoid intake
obtunded, hypercapnic resp failure
KIMBERLY on CKD
Hypoxemia
RRT for lethargy and hypotension
J18.8:      Other pneumonia, unspecified organism  A41.8:     Other specified sepsis  D46.7:    Other myelodysplastic syndromes  J44.0:      Chronic obstructive pulmonary disease with acute lower respiratory infection  I50        congestive heart failure  D63.8   Anemia chronic disease  D63.1   Anemia CKD  N18.31  CKD3a
hypotension
RRT with hypercapnic respiratory failure and obtundation.

## 2025-07-01 NOTE — PROGRESS NOTE ADULT - ASSESSMENT
84 female PMH of A-fib on Eliquis, COPD, CKD, aplastic anemia, polymyalgia rheumatica, on chronic steroids, avascular necrosis of hips, HLD, HTN, DM, recent admission to [facility] 5/26 through 6/5/2025 for CHF/fluid overload/COPD exacerbation, presents to the ED form [facility] for evaluation of fever and generalized feeling of being unwell, found to be septic and hypotensive.       baseline o2-dep copd  adm with pna  s/p bronch for hemoptysis, oozing from rul but no lesion found  had been on 1e but developed hypothermia, hypotension, unresponsiveness and hypercarbic resp failure  she has not been wearing her bipap, and has been told that this is likely to result in recurrent episodes of severe hypercarbic resp failure    Acute hypercarbic resp failure, metabolic encephalopathy , PNA, anemia , KIMBERLY on CKD   sp ICU now on nc on tele floor   normal lvef with lvh and mod-sev pulm htn, no need to repeat at this time  has had degree of vol ol at times, now appears euvolemic, though pocus in the icu suggests degree of vol ol given incr ivc  started on iv lasix , anticipate changing to po tomorrow   strict intake and output    no acute ischemia   EKG Sb with PACs prolonged qt     bp had been stable off Midodrine and Northera, and off steroids, noting addl hx of dysautonomia  on stress dose steroids   sp pressors in ICU    paf and vte was on ac, now off 2/2 hemoptysis   to remains off for now as per pulm given recurrent bleeding   episodes pat, cont amio and bb.    KIMBERLY on CKD followed by renal     Monitor and replete electrolytes. Keep K>4.0 and Mg>2.0.

## 2025-07-01 NOTE — PROGRESS NOTE ADULT - PROBLEM SELECTOR PLAN 1
Patient is s/p RRT 6/28 due to symptoms of hypothermia, hypotension, and lethargy while not using bipap overnight.     - pulm following    - on AVAPs. educated that she needs to stay on for full 8 hours to prevent     - wean to keep pulse ox >88%     - cont advair, duoneb, hypertonic saline QID

## 2025-07-01 NOTE — PROGRESS NOTE ADULT - ASSESSMENT
REASON FOR VISIT  .. Management of problems listed below      EVENTS/CURRENT ISSUES.  . 6/28/2025 tr to icu (had refused avaps last noc)   . 6/26/2025 No further hemoptysis while off apixaba   . 6/24/2025 tr out of icu   . 6/23/2025 tr icu  . 6/22/2025 worsening gas exchange placed on hfnc apixa stoppd   . 6/20/2025 apixa restartd and noc bpap orderde   . 6/16/2025 fob done oozing rul and l lo lobe post basal   . 6/14/2025 iv ufh dced   . 6/13/2025 continues to have mild hemoptysis but is also on iv ufh drip   . 6/13/2025 dw pt re rbaa of bronch and se agrees scheduled for 6/16 10 in or   . 6/13/2025 dw cardio and id   . 6/11/2025 qft funfitell and galactomannan orderd   . 6/11/2025 pt wa sstarted on iv ufh for hemoptysis and pleuritic chest pain but was stopped when vq showed vlp   . 6/10/2025 Mild hemoptysis   . 6/9 tr out of icu  . 6/8/2025  . PNEUMONIA RML 6/8/2025  . SHOCK 6/8/2025   . NOREPI 6/8/2025   . STRESS STEROIDS   .... HYDROCORTISONE 6/8/2025  . A fib (chronic) POA 6/8/2025  . ANEMIA Hb 6/8/2025 Hb 7.5  . KIMBERLY ON CKD Cr 6/8/2025 Cr 1.9        REVIEW OF SYMPTOMS   Able to give ROS  Yes     RELIABILITY +/-   CONSTITUTIONAL Weakness Yes    ENDOCRINE  No heat or cold intolerance    ALLERGY No hives  Sore throat No stridor  RESP Shortness of breath YES   NEURO New weakness No   CARDIAC   Palpitations No         PHYSICAL EXAM    HEENT Unremarkable  atraumatic   RESP Fair air entry  Harsh breath sound   CARDIAC S1 S2 No S3     NO JVD    ABDOMEN No hepatosplenomegaly   PEDAL EDEMA present No calf tenderness    REASON FOR VISIT  .. Management of problems listed below      CC.   . 6/8/2025 brought in by ems for right sided chest pain that started at 3am- nonradiating- patient was offered aspirin by ems- patient refused and stated to ems that she is on plavix and was advised not to take aspirin. patient on 43 litres nasl cannula-     OVERALL PRESENTATION.  . 6/8/2025 84 yr old female with PMHx afib on eliquis, COPD (not on home O2), PE/Rt lower extremity DVT 2017, Rt LE IVC filter 2017 CKD3, aplastic anemia, polymyalgia rheumatica on prednisone 5 mg po qd, requiring PRBC transfusions ~8 weeks (via Rt subclavian mediport), AVN bilat hips, HTN, HLD, HFpEF (75% 6.2.25), diastolic dysfx grade1, recent hospitalization 5/25/25-6/5/25 for ADHF/COPD exacerbation, Pt with eventual improvement and transfer to [facility] facility from where she presents to E.D. this a.m. 6/8/25 with c/o Rt sided chest pain. general malaise. Pt stated began to feel ill evening before presentation, daughter this a.m. endorsed pt lethargic, pale.     In E.D. Pt found to have fever with tmax of 101, KIMBERLY on CKD with sCr 1.90 (baseline 1.2-1.6), HYPOtnsive with SBP 80's (pt on midodrine therapy, usual 's). In addition found to have leukocytosis of 37.6 (baseline 5.25 6/5/25), procalcitonin of 13.6, Hgb 7.5, elevated INR 2.27,  Chest xray demonstrated RML consolidations, concerning for pneum. In E.D. pt receiving 500 cc's NS, Vanco 1 gm, zosyn. Pt received tylenol 1 gm IV x1.       BEST PRACTICE ISSUES.  . HOB ELEVATN.    .... Yes  . DIET  .   .... DASH 6/8/2025   . FREE WATER.   ....   .  IV FLUID.  .... 6/15-6/20/2025 1/2 ns 50   ..... 6/8 lr 40 x 12 h   . PHARMAC DVT PPLX .    .... 6/22 apixa dced suspect hemoptysis   .... 6/20 apixa 2.5 x 2   .... 6/12-6/14/2025 iv ufh (hospitalist)  .... 6/11/2025 hpsc 5k.2   .... 6/10/2025 4:46 PM iv ufh   .... HPSC 6/9-6/10/2025   . NON PHARMAC DVT PPLX .    .... 6/23/2025 compr device   . STRESS ULCR PPLX .   .... PROTONIX 40 6/22/2025   . DATE/DM MGMT.   ..... See under Endocrine section   GENERAL DATA .   . COVID.         .... scv2 6/8/2025 scv2 (-)   . GOC.    ....    . ICU STAY.   .... 6/28/2025   .... 6/22 - 6/25   .... 6/8-6/9   . INFECTION PPLX .   .... CHLORHEXIDINE 2% 6/8/2025   . ALLGY.   ....  nka  . WT.   .... 6/8/2025 69   . BMI.  ....6/8/2025 26      [id number]  VITALS/GAS EXCHANGE/DRIPS    ABGS.   6/20/2025 4p 5l 736/71/65   6/22/2025 7p bpap 16/8/1 731/82/153   6/23/2025 11p bpap 12/5/.4 727/92/57  6/23/2025 2a avaps 450/18/8/25/10 .45 740/67/85   6/26/2025 5a 3l 742/70/67   6/28/2025 9p avaps 24/475/30/8/30/10 743/61/82  .  VS/ PO/IO/ VENT/ DRIPS.  7/1/2025 afeb 84 120/60   7/1/2025 4l 97%    [id number]   SUMMARY BASELINE .     84 yr old female pt of Dr Gallegos not on home ox or home cpap used to use trelegy lives with spouse quit 40 y 1/2 ppd with PMHx afib on eliquis, COPD (not on home O2), PE/Rt lower extremity DVT 2017, Rt LE IVC filter 2017 CKD3, aplastic anemia, polymyalgia rheumatica on prednisone 5 mg po qd, requiring PRBC transfusions around 8 weeks (via Rt subclavian mediport), AVN bilat hips, HTN, HLD, HFpEF (75% 6.2.25), diastolic dysfx grade1, recent hospitalization 5/25/25-6/5/25 for ADHF/COPD exacerbation, Pt with eventual improvement and transfer to [facility] facility   CC.   . 6/8/2025 R CHEST PAIN   MAIN ISSUES.  . COPD 6/23/2025 hydrocort 50.3 6/27/2025 pred 10   . HYPERCAPNIC RESP FAILURE: 6/20/2025 4p 5l 736/71/65 6/23/2025 2a avaps 450/18/8/25/10 .45 740/67/85   .... 6/20/2025 Noct bpap 15/5/35/16 ordrd 6/23 noct avaps .45 14 epap 8 vt 450 25/10   . RESP FAILURE 6/22/2025  HFNC 6/22 5p HFNC 80% 50l po 95% tr icu   . MILD HEMOPTYSOIS 6/10/2025 6/22 apixa stoppd   . PNEUMONIA RML 6/8/2025:  ZOSYN 6/8-6/16/2025   . PLEURITIS CHEST PAIN 6/10/2025: 6/10 vte excluded vlp vq   . SHOCK 6/8/2025: tr oo icu 6/9   . NOREPI 6/8-6/9/2025   . STRESS STEROIDS : HYDROCORTISONE 6/8-6/17/2025  . A fib (chronic) POA 6/8/20256/22/2025 6/22 apixa stopped   . CHF: tte 6/2/2025  ef 71%  pasp 54  la mod dilatd 6/22 lasix 40   . ANEMIA Hb 6/8-6/13-6/25/2025 Hb 7.5- 8.4- 8.5  . TRANSFUSION 6/8  1 u prbc  6/22 1 u prbc   . KIMBERLY ON CKD Cr 6/8-6/13/2025 Cr 1.9- 1.1  . HYPERNATREMIA 6/24-6/25/2025 Na 147-146     . PNEUMONIA: 6/22 ct bl ul opac 6/22-6/29/2025 zosyn   . SUSPECTED BLEEDING IN [place] CAUSED RESP DECOMPENSATION AND NEW OPACITIES ON 6/22 CT  6/22 622 apixa stoppd     . RESP FAILURE 6/28/2025 LIKELY DUE TO NONCOMPLIANCE WITH NOCT AVAP Tr to ICU 6/28/2025 AVAPS 30% 24   . COPD HYDROCORT 6/29/2025 h 50.3   . KIMBERLY ON CKD 6/28/2025 Cr 1.4    . HYPOPHOSPHATEMIA 6/28/2025 PO4 .7     PMH.   . AFon Eliquis,   . COPD (not on home o2),   . CKD,   . aplastic anemia,   . TRANSFUSION 6/2/2025 1 u prbc   . PMR (chronic prednisone with AVN hip),   . HLD,   . HTN,   . DM    PROCEDURES/DEVICES .  . 6/16/2025 FOB br wash rul ooze rul l lo lobe post basal     PAST PROCEDURES   . r mediport poa 6/8/2025     DISCUSSIONS.  .... Discussed with primary care and relevant consultants on an ongoing basis       TIME SPENT.  . Over 36 minutes aggregate care time spent on encounter; activities included   direct patient care, counseling and/or coordinating care reviewing notes, lab data/ imaging , discussion with multidisciplinary team/ patient  /family and explaining in detail risks, benefits, alternatives  of the recommendations     ROBERT HENRIQUEZ 84 6/8/2025 1942

## 2025-07-01 NOTE — CONSULT NOTE ADULT - SUBJECTIVE AND OBJECTIVE BOX
Patient is a 84y old  Female who presents with a chief complaint of septic shock, PNA (30 Jun 2025 16:29)      Reason For Consult: adrenal insuff due to chronic glucocorticoid intake    HPI:  84 yr old female with PMHx afib on eliquis, COPD (not on home O2), PE/Rt lower extremity DVT 2017, Rt LE IVC filter 2017 CKD3, aplastic anemia, polymyalgia rheumatica on prednisone 5 mg po qd, requiring PRBC transfusions ~8 weeks (via Rt subclavian mediport), AVN bilat hips, HTN, HLD, HFpEF (75% 6.2.25), diastolic dysfx grade1, recent hospitalization 5/25/25-6/5/25 for AHDF/COPD exacerbation, pt also with hx of IVC filter, had Rt subclavian mediport. Pt with eventual improvement and transfer to [facility] facility from where she presents to E.D. today with c/o Rt sided chest pain. general malaise. Pt stated began to feel ill evening before presentation, daughter this a.m. endorsed pt lethargic, pale.     ED course  Vitals: 99.9 T , HR 71 , BP 82/48, RR 16 , SpO2 99% on 3L  Significant labs: WBC 32.33 Hgb 7.5 BUN 72 Cr 1.9 Mag 1.4 procal 13.61 pro-bnp 6599  RVP neg  Imaging: CXR: Dense infiltrate off the right upper hilum is presently seen  EKG: Sinus rhythm 80 bpm with premature supraventricular complexes Left axis deviation Possible Lateral infarct, age undetermined  In ED patient given: azithromycin x1, zosyn x1, vanco x1, IVF NS 500mL bolus x1, ofirmev x1, midodrine 10mg x1, 1U PRBC       (08 Jun 2025 15:50)      PAST MEDICAL & SURGICAL HISTORY:  COPD (chronic obstructive pulmonary disease)      DM (diabetes mellitus)  diet-controlled      PAF (paroxysmal atrial fibrillation)  s/p ablation      Hiatal hernia      H/O aplastic anemia      History of IBS      H/O osteoporosis      HLD (hyperlipidemia)      PMR (polymyalgia rheumatica)      History of basal cell cancer      Chronic kidney disease (CKD)      Hypotension      Presence of IVC filter      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      History of immunodeficiency      Lumbar compression fracture      H/O hiatal hernia      H/O pulmonary fibrosis      H/O sinus bradycardia      History of fracture of right hip  "unoperable"      S/P hysterectomy      S/P foot surgery      S/P knee surgery      After cataract  bilateral eye cataract surgically removed      Closed right hip fracture  rigth hip ORIF july 19 2016      S/P IVC filter  nov 2016      S/P carpal tunnel release  Right 2008 & 6/27/17      H/O kyphoplasty      Port-A-Cath in place  right chest          FAMILY HISTORY:  Family history of bladder cancer (Mother)    Family history of myocardial infarction (Father)    FH: atrial fibrillation (Father)          Social History:    MEDICATIONS  (STANDING):  albuterol/ipratropium for Nebulization 3 milliLiter(s) Nebulizer three times a day  aMIOdarone    Tablet 100 milliGRAM(s) Oral daily  chlorhexidine 2% Cloths 1 Application(s) Topical <User Schedule>  dextrose 5%. 1000 milliLiter(s) (50 mL/Hr) IV Continuous <Continuous>  dextrose 5%. 1000 milliLiter(s) (100 mL/Hr) IV Continuous <Continuous>  dextrose 50% Injectable 12.5 Gram(s) IV Push once  dextrose 50% Injectable 25 Gram(s) IV Push once  dextrose 50% Injectable 25 Gram(s) IV Push once  fluticasone propionate/ salmeterol 500-50 MICROgram(s) Diskus 1 Dose(s) Inhalation two times a day  furosemide   Injectable 40 milliGRAM(s) IV Push daily  glucagon  Injectable 1 milliGRAM(s) IntraMuscular once  hydrocortisone sodium succinate Injectable 25 milliGRAM(s) IV Push every 12 hours  insulin lispro (ADMELOG) corrective regimen sliding scale   SubCutaneous three times a day before meals  insulin lispro (ADMELOG) corrective regimen sliding scale   SubCutaneous at bedtime  melatonin 5 milliGRAM(s) Oral at bedtime  pantoprazole  Injectable 40 milliGRAM(s) IV Push daily  sodium chloride 3%  Inhalation 4 milliLiter(s) Inhalation three times a day    MEDICATIONS  (PRN):  acetaminophen     Tablet .. 650 milliGRAM(s) Oral every 6 hours PRN Mild Pain (1 - 3)  dextrose Oral Gel 15 Gram(s) Oral once PRN Blood Glucose LESS THAN 70 milliGRAM(s)/deciliter        T(C): 36.5 (07-01-25 @ 05:08), Max: 36.9 (06-30-25 @ 12:20)  HR: 72 (07-01-25 @ 05:08) (66 - 97)  BP: 135/69 (07-01-25 @ 05:08) (129/75 - 146/69)  RR: 19 (07-01-25 @ 05:08) (18 - 19)  SpO2: 95% (07-01-25 @ 05:08) (95% - 100%)  Wt(kg): --    PHYSICAL EXAM:  GENERAL: NAD, well-groomed, well-developed  HEAD:  Atraumatic, Normocephalic  NECK: Supple, No JVD, Normal thyroid  CHEST/LUNG: Clear to percussion bilaterally; No rales, rhonchi, wheezing, or rubs  HEART: Regular rate and rhythm; No murmurs, rubs, or gallops  ABDOMEN: Soft, Nontender, Nondistended; Bowel sounds present  EXTREMITIES:  2+ Peripheral Pulses, No clubbing, cyanosis, or edema  SKIN: No rashes or lesions    CAPILLARY BLOOD GLUCOSE      POCT Blood Glucose.: 132 mg/dL (01 Jul 2025 08:15)  POCT Blood Glucose.: 124 mg/dL (30 Jun 2025 22:35)  POCT Blood Glucose.: 147 mg/dL (30 Jun 2025 17:25)  POCT Blood Glucose.: 159 mg/dL (30 Jun 2025 12:32)  POCT Blood Glucose.: 150 mg/dL (30 Jun 2025 08:43)                            8.3    2.50  )-----------( 187      ( 01 Jul 2025 06:30 )             26.1       CMP:  07-01 @ 06:30  SGPT 27  Albumin 3.0   Alk Phos 63   Anion Gap 4   SGOT 30   Total Bili 0.4   BUN 32   Calcium Total 8.1   CO2 41   Chloride 100   Creatinine 0.80   eGFR if AA --   eGFR if non AA --   Glucose 117   Potassium 3.5   Protein 6.1   Sodium 145      Thyroid Function Tests:      Diabetes Tests:       Radiology:

## 2025-07-01 NOTE — PROGRESS NOTE ADULT - SUBJECTIVE AND OBJECTIVE BOX
CHIEF COMPLAINT/ REASON FOR VISIT  .. Patient was seen to address the  issue listed under PROBLEM LIST which is located toward bottom of this note     MARTINEZ PATEL TELN 332 D1    Allergies    No Known Allergies    Intolerances        PAST MEDICAL & SURGICAL HISTORY:  COPD (chronic obstructive pulmonary disease)      DM (diabetes mellitus)  diet-controlled      PAF (paroxysmal atrial fibrillation)  s/p ablation      Hiatal hernia      H/O aplastic anemia      History of IBS      H/O osteoporosis      HLD (hyperlipidemia)      PMR (polymyalgia rheumatica)      History of basal cell cancer      Chronic kidney disease (CKD)      Hypotension      Presence of IVC filter      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      History of immunodeficiency      Lumbar compression fracture      H/O hiatal hernia      H/O pulmonary fibrosis      H/O sinus bradycardia      History of fracture of right hip  "unoperable"      S/P hysterectomy      S/P foot surgery      S/P knee surgery      After cataract  bilateral eye cataract surgically removed      Closed right hip fracture  rigth hip ORIF july 19 2016      S/P IVC filter  nov 2016      S/P carpal tunnel release  Right 2008 & 6/27/17      H/O kyphoplasty      Port-A-Cath in place  right chest          FAMILY HISTORY:  Family history of bladder cancer (Mother)    Family history of myocardial infarction (Father)    FH: atrial fibrillation (Father)        Home Medications:  amiodarone 200 mg oral tablet: 0.5 tab(s) orally once a day (08 Jun 2025 16:43)  Bentyl 10 mg oral capsule: 1 cap(s) orally 2 times a day, As Needed (08 Jun 2025 16:43)  Eliquis 2.5 mg oral tablet: 1 tab(s) orally 2 times a day (08 Jun 2025 16:43)  fluticasone-salmeterol 500 mcg-50 mcg/inh inhalation powder: 1 puff(s) inhaled 2 times a day (08 Jun 2025 16:43)  folic acid 1 mg oral tablet: 1 tab(s) orally once a day (in the morning) (08 Jun 2025 16:43)  gabapentin 400 mg oral capsule: 1 cap(s) orally 2 times a day (08 Jun 2025 16:43)  ipratropium-albuterol 0.5 mg-2.5 mg/3 mL inhalation solution: 3 milliliter(s) inhaled every 6 hours As needed Shortness of Breath and/or Wheezing (08 Jun 2025 16:43)  IVIG monthly:  (08 Jun 2025 16:43)  leflunomide 10 mg oral tablet: 1 tab(s) orally once a day (08 Jun 2025 16:43)  midodrine 10 mg oral tablet: 1 tab(s) orally every 8 hours (08 Jun 2025 16:43)  montelukast 10 mg oral tablet: 1 tab(s) orally once a day (08 Jun 2025 16:43)  pantoprazole 40 mg oral delayed release tablet: 1 tab(s) orally once a day (before a meal) (08 Jun 2025 16:43)  predniSONE 5 mg oral tablet: 1 tab(s) orally once a day (08 Jun 2025 16:43)  Procrit 10,000 units/mL preservative-free injectable solution: injectable once a week WEDNESDAY (08 Jun 2025 16:43)  Reclast Infusion , IV x 1 yearly:  (08 Jun 2025 16:43)  simvastatin 10 mg oral tablet: 1 tab(s) orally once a day (at bedtime) (08 Jun 2025 16:43)  tiotropium 2.5 mcg/inh inhalation aerosol: 2 puff(s) inhaled once a day (08 Jun 2025 16:43)  tiZANidine: 2 tab(s) orally once a day (at bedtime) as needed for  muscle spasm (08 Jun 2025 16:43)  torsemide 20 mg oral tablet: 1 tab(s) orally once a day (in the morning) (08 Jun 2025 16:49)      MEDICATIONS  (STANDING):  albuterol/ipratropium for Nebulization 3 milliLiter(s) Nebulizer three times a day  aMIOdarone    Tablet 100 milliGRAM(s) Oral daily  chlorhexidine 2% Cloths 1 Application(s) Topical <User Schedule>  dextrose 5%. 1000 milliLiter(s) (50 mL/Hr) IV Continuous <Continuous>  dextrose 5%. 1000 milliLiter(s) (100 mL/Hr) IV Continuous <Continuous>  dextrose 50% Injectable 12.5 Gram(s) IV Push once  dextrose 50% Injectable 25 Gram(s) IV Push once  dextrose 50% Injectable 25 Gram(s) IV Push once  fluticasone propionate/ salmeterol 500-50 MICROgram(s) Diskus 1 Dose(s) Inhalation two times a day  furosemide   Injectable 40 milliGRAM(s) IV Push daily  glucagon  Injectable 1 milliGRAM(s) IntraMuscular once  hydrocortisone sodium succinate Injectable 25 milliGRAM(s) IV Push every 12 hours  insulin lispro (ADMELOG) corrective regimen sliding scale   SubCutaneous three times a day before meals  insulin lispro (ADMELOG) corrective regimen sliding scale   SubCutaneous at bedtime  melatonin 5 milliGRAM(s) Oral at bedtime  pantoprazole  Injectable 40 milliGRAM(s) IV Push daily  sodium chloride 3%  Inhalation 4 milliLiter(s) Inhalation three times a day    MEDICATIONS  (PRN):  acetaminophen     Tablet .. 650 milliGRAM(s) Oral every 6 hours PRN Mild Pain (1 - 3)  dextrose Oral Gel 15 Gram(s) Oral once PRN Blood Glucose LESS THAN 70 milliGRAM(s)/deciliter      Diet, Regular:   DASH/TLC Sodium & Cholesterol Restricted (06-23-25 @ 13:52) [Active]          Vital Signs Last 24 Hrs  T(C): 36.5 (01 Jul 2025 05:08), Max: 36.9 (30 Jun 2025 12:20)  T(F): 97.7 (01 Jul 2025 05:08), Max: 98.4 (30 Jun 2025 12:20)  HR: 74 (01 Jul 2025 08:35) (66 - 97)  BP: 135/69 (01 Jul 2025 05:08) (129/75 - 146/69)  BP(mean): --  RR: 19 (01 Jul 2025 05:08) (18 - 19)  SpO2: 98% (01 Jul 2025 08:35) (95% - 100%)    Parameters below as of 01 Jul 2025 08:35  Patient On (Oxygen Delivery Method): nasal cannula, 3          06-30-25 @ 07:01  -  07-01-25 @ 07:00  --------------------------------------------------------  IN: 850 mL / OUT: 1250 mL / NET: -400 mL    07-01-25 @ 07:01 - 07-01-25 @ 09:34  --------------------------------------------------------  IN: 0 mL / OUT: 500 mL / NET: -500 mL              LABS:                        8.3    2.50  )-----------( 187      ( 01 Jul 2025 06:30 )             26.1     07-01    145  |  100  |  32[H]  ----------------------------<  117[H]  3.5   |  41[H]  |  0.80    Ca    8.1[L]      01 Jul 2025 06:30  Phos  2.7     06-30  Mg     1.7     06-30    TPro  6.1  /  Alb  3.0[L]  /  TBili  0.4  /  DBili  x   /  AST  30  /  ALT  27  /  AlkPhos  63  07-01      Urinalysis Basic - ( 01 Jul 2025 06:30 )    Color: x / Appearance: x / SG: x / pH: x  Gluc: 117 mg/dL / Ketone: x  / Bili: x / Urobili: x   Blood: x / Protein: x / Nitrite: x   Leuk Esterase: x / RBC: x / WBC x   Sq Epi: x / Non Sq Epi: x / Bacteria: x            WBC:  WBC Count: 2.50 K/uL (07-01 @ 06:30)  WBC Count: 3.98 K/uL (06-30 @ 08:10)  WBC Count: 2.55 K/uL (06-29 @ 06:25)  WBC Count: 9.71 K/uL (06-28 @ 06:20)      MICROBIOLOGY:  RECENT CULTURES:  06-28 Urine XXXX XXXX   <10,000 CFU/mL Normal Urogenital Ginger                    Sodium:  Sodium: 145 mmol/L (07-01 @ 06:30)  Sodium: 145 mmol/L (06-30 @ 08:10)  Sodium: 143 mmol/L (06-29 @ 06:25)  Sodium: 138 mmol/L (06-28 @ 06:20)      0.80 mg/dL 07-01 @ 06:30  0.97 mg/dL 06-30 @ 08:10  1.20 mg/dL 06-29 @ 06:25  1.40 mg/dL 06-28 @ 06:20      Hemoglobin:  Hemoglobin: 8.3 g/dL (07-01 @ 06:30)  Hemoglobin: 9.1 g/dL (06-30 @ 08:10)  Hemoglobin: 7.2 g/dL (06-29 @ 06:25)  Hemoglobin: 8.7 g/dL (06-28 @ 06:20)      Platelets: Platelet Count - Automated: 187 K/uL (07-01 @ 06:30)  Platelet Count - Automated: 145 K/uL (06-30 @ 08:10)  Platelet Count - Automated: Clumped (06-29 @ 06:25)  Platelet Count - Automated: Clumped K/uL (06-28 @ 06:20)      LIVER FUNCTIONS - ( 01 Jul 2025 06:30 )  Alb: 3.0 g/dL / Pro: 6.1 g/dL / ALK PHOS: 63 U/L / ALT: 27 U/L / AST: 30 U/L / GGT: x             Urinalysis Basic - ( 01 Jul 2025 06:30 )    Color: x / Appearance: x / SG: x / pH: x  Gluc: 117 mg/dL / Ketone: x  / Bili: x / Urobili: x   Blood: x / Protein: x / Nitrite: x   Leuk Esterase: x / RBC: x / WBC x   Sq Epi: x / Non Sq Epi: x / Bacteria: x        RADIOLOGY & ADDITIONAL STUDIES:      MICROBIOLOGY:  RECENT CULTURES:  06-28 Urine XXXX XXXX   <10,000 CFU/mL Normal Urogenital Ginger

## 2025-07-01 NOTE — CONSULT NOTE ADULT - PROBLEM SELECTOR PROBLEM 1
Myelodysplasia, high grade
Type 2 diabetes mellitus
Acute respiratory failure with hypoxia and hypercapnia

## 2025-07-01 NOTE — PROGRESS NOTE ADULT - ASSESSMENT
84F h/o AFib on Eliquis, COPD, CKD, aplastic anemia, polymyalgia rheumatica on chronic steroids, avascular necrosis of hips, HTN, HLD, with recent admission to [facility] 5/26 - 6/5 for acute HFpEF exacerbation and COPD exacerbation, discharged to rehab on 6/5 and returning today with right sided chest pain, general malaise and feeling unwell. She reports some slight cough though otherwise no other new symptoms reported. Found to be hypotensive, febrile w/ leukocytosis. Admitted to ICU for septic shock likely 2/2 to PNA and KIMBERLY. downgraded to tele. s/p RRT 6/28 for hypothermia, hypotension and lethargy. Patient is being weaned off stress doses of steroids after being treated for adrenal insufficiency.

## 2025-07-01 NOTE — CONSULT NOTE ADULT - CONSULT REQUESTED DATE/TIME
08-Jun-2025 13:15
08-Jun-2025 14:33
08-Jun-2025 14:48
10-Nick-2025 13:31
08-Jun-2025 12:21
28-Jun-2025 09:22
22-Jun-2025 20:07
09-Jun-2025 23:52
22-Jun-2025 12:32
01-Jul-2025 08:36
23-Jun-2025

## 2025-07-01 NOTE — PROGRESS NOTE ADULT - ATTENDING COMMENTS
84F h/o AFib on Eliquis, COPD, CKD, aplastic anemia, polymyalgia rheumatica on chronic steroids, avascular necrosis of hips, HTN, HLD, with recent admission to [facility] 5/26 - 6/5 for acute HFpEF exacerbation and COPD exacerbation, discharged to rehab on 6/5 and returning today with right sided chest pain, general malaise and feeling unwell. She reports some slight cough though otherwise no other new symptoms reported. Found to be hypotensive, febrile w/ leukocytosis. Admitted to ICU for septic shock likely 2/2 to PNA and KIMBERLY. downgraded to tele. s/p RRT 6/28 for hypothermia, hypotension and lethargy    Metabolic encephalopathy   acute on chronic hypercapnic respiratory failure    - pulm following    - on AVAPs. educated that she needs to stay on for full 8 hours.    - wean to keep pulse ox >88%     - cont advair, duoneb, hypertonic saline QID    shock secondary to adrenal insufficiency     - on stress dosed steroids. solucortef weaned    PNA/hemoptysis     - s/p bronch with blood in the RUL. AC held.     - weaned off droxidopa/midodrine. Weaning steroids as tolerated. will need a prolonged taper.     - completed zosyn 6/29.     - Pulm/ID following     Aplastic anemia    - eliquis held. patient had recurrent hemoptysis with evidence of bleeding on bronch. s/p prbc on 6/23.     KIMBERLY/CKD3, prerenal azotemia     - nephrology following     - potassium supplemented     afib     - cardio following.     - cont amiodarone 100mg daily     - BB and diuretics held    - discussed with cardio, patient has been on midodrine and florinef in the past for orthostatic hypotension.      PMR    - on chronic prednisone 5mg daily at home.     HLD    - cont statin     DVT proph: SCDs given hemoptysis  daughter updated over the phone. weaned solucortef.

## 2025-07-01 NOTE — CONSULT NOTE ADULT - PROVIDER SPECIALTY LIST ADULT
Critical Care
Critical Care
Heme/Onc
Critical Care
Critical Care
Nephrology
Infectious Disease
Cardiology
Pulmonology
TeleCritical Care
Endocrinology

## 2025-07-01 NOTE — SOCIAL WORK PROGRESS NOTE - NSSWPROGRESSNOTE_GEN_ALL_CORE
Discussed at daily rounds. MD confirmed patient is not stable for discharge. I contacted [facility] and confirmed patient can return when discharge ready. Social work to follow.

## 2025-07-01 NOTE — PROGRESS NOTE ADULT - SUBJECTIVE AND OBJECTIVE BOX
Patient was seen in the AM at the bedside while getting nebulizer and waiting to eat breakfast. Patient reports feeling better since admission and has no complaints. Breathing well on NC. Denies chest pain, palpitations, N/V, abdominal pain, dizziness/light-headed. Sleep is okay in the hospital. Appetite is good. Has not been able to ambulate but can sit on chair by bedside.        Patient is a 84y old  Female who presents with a chief complaint of septic shock, PNA (01 Jul 2025 09:34)      INTERVAL HPI/OVERNIGHT EVENTS:   Patient was seen in the AM at the bedside while getting nebulizer and waiting to eat breakfast. Patient reports feeling better since admission and has no complaints. Breathing well on NC. Denies chest pain, palpitations, N/V, abdominal pain, dizziness/light-headed. Sleep is okay in the hospital. Appetite is good. Has not been able to ambulate but can sit on chair by bedside.       MEDICATIONS  (STANDING):  albuterol/ipratropium for Nebulization 3 milliLiter(s) Nebulizer three times a day  aMIOdarone    Tablet 100 milliGRAM(s) Oral daily  chlorhexidine 2% Cloths 1 Application(s) Topical <User Schedule>  dextrose 5%. 1000 milliLiter(s) (50 mL/Hr) IV Continuous <Continuous>  dextrose 5%. 1000 milliLiter(s) (100 mL/Hr) IV Continuous <Continuous>  dextrose 50% Injectable 12.5 Gram(s) IV Push once  dextrose 50% Injectable 25 Gram(s) IV Push once  dextrose 50% Injectable 25 Gram(s) IV Push once  fluticasone propionate/ salmeterol 500-50 MICROgram(s) Diskus 1 Dose(s) Inhalation two times a day  furosemide   Injectable 40 milliGRAM(s) IV Push daily  glucagon  Injectable 1 milliGRAM(s) IntraMuscular once  hydrocortisone sodium succinate Injectable 25 milliGRAM(s) IV Push every 12 hours  insulin lispro (ADMELOG) corrective regimen sliding scale   SubCutaneous three times a day before meals  insulin lispro (ADMELOG) corrective regimen sliding scale   SubCutaneous at bedtime  melatonin 5 milliGRAM(s) Oral at bedtime  pantoprazole  Injectable 40 milliGRAM(s) IV Push daily  sodium chloride 3%  Inhalation 4 milliLiter(s) Inhalation three times a day    MEDICATIONS  (PRN):  acetaminophen     Tablet .. 650 milliGRAM(s) Oral every 6 hours PRN Mild Pain (1 - 3)  dextrose Oral Gel 15 Gram(s) Oral once PRN Blood Glucose LESS THAN 70 milliGRAM(s)/deciliter      Allergies    No Known Allergies    Intolerances        REVIEW OF SYSTEMS:  CONSTITUTIONAL: No fever  RESPIRATORY: No shortness of breath  CARDIOVASCULAR: No chest pain, palpitations  GASTROINTESTINAL: No abd pain, nausea, vomiting, or diarrhea  NEUROLOGICAL: no dizziness    Vital Signs Last 24 Hrs  T(C): 36.7 (01 Jul 2025 11:36), Max: 36.7 (30 Jun 2025 20:41)  T(F): 98.1 (01 Jul 2025 11:36), Max: 98.1 (01 Jul 2025 11:36)  HR: 68 (01 Jul 2025 11:36) (66 - 97)  BP: 125/66 (01 Jul 2025 11:36) (125/66 - 146/69)  BP(mean): --  RR: 19 (01 Jul 2025 11:36) (19 - 19)  SpO2: 97% (01 Jul 2025 11:36) (95% - 100%)    Parameters below as of 01 Jul 2025 11:36  Patient On (Oxygen Delivery Method): nasal cannula  O2 Flow (L/min): 4.5      PHYSICAL EXAM:  GENERAL: NAD, pleasant, awake, interactive, sitting up on chair.   HEENT:  anicteric, moist mucous membranes  CHEST/LUNG:  CTA b/l with some decreased breath sounds, breathing comfortably on NC  HEART:  RRR, S1, S2  ABDOMEN:  soft, nontender, nondistended  EXTREMITIES: no edema, some tenderness to palpation, no erythema or swelling.   NERVOUS SYSTEM: answers questions and follows commands appropriately    LABS:                        8.3    2.50  )-----------( 187      ( 01 Jul 2025 06:30 )             26.1     CBC Full  -  ( 01 Jul 2025 06:30 )  WBC Count : 2.50 K/uL  Hemoglobin : 8.3 g/dL  Hematocrit : 26.1 %  Platelet Count - Automated : 187 K/uL  Mean Cell Volume : 94.6 fl  Mean Cell Hemoglobin : 30.1 pg  Mean Cell Hemoglobin Concentration : 31.8 g/dL  Auto Neutrophil # : x  Auto Lymphocyte # : x  Auto Monocyte # : x  Auto Eosinophil # : x  Auto Basophil # : x  Auto Neutrophil % : x  Auto Lymphocyte % : x  Auto Monocyte % : x  Auto Eosinophil % : x  Auto Basophil % : x    01 Jul 2025 06:30    145    |  100    |  32     ----------------------------<  117    3.5     |  41     |  0.80     Ca    8.1        01 Jul 2025 06:30    TPro  6.1    /  Alb  3.0    /  TBili  0.4    /  DBili  x      /  AST  30     /  ALT  27     /  AlkPhos  63     01 Jul 2025 06:30      Urinalysis Basic - ( 01 Jul 2025 06:30 )    Color: x / Appearance: x / SG: x / pH: x  Gluc: 117 mg/dL / Ketone: x  / Bili: x / Urobili: x   Blood: x / Protein: x / Nitrite: x   Leuk Esterase: x / RBC: x / WBC x   Sq Epi: x / Non Sq Epi: x / Bacteria: x      CAPILLARY BLOOD GLUCOSE      POCT Blood Glucose.: 119 mg/dL (01 Jul 2025 12:17)  POCT Blood Glucose.: 132 mg/dL (01 Jul 2025 08:15)  POCT Blood Glucose.: 124 mg/dL (30 Jun 2025 22:35)  POCT Blood Glucose.: 147 mg/dL (30 Jun 2025 17:25)        Urinalysis with Rflx Culture (collected 06-28-25 @ 16:20)    Culture - Urine (collected 06-28-25 @ 16:20)  Source: Urine  Final Report (06-30-25 @ 06:49):    <10,000 CFU/mL Normal Urogenital Ginger        RADIOLOGY & ADDITIONAL TESTS:    Personally reviewed.     Consultant(s) Notes Reviewed:  [x] YES  [ ] NO

## 2025-07-01 NOTE — CONSULT NOTE ADULT - PROBLEM SELECTOR RECOMMENDATION 9
cont low dose admelog corrective scale coverage qac/qhs  cont cons cho diet  bg numbers at goal in hosp setting
the acuity of this and worsening hypercapnia while on ventilator support (non-invasive) is concerning and could lead to life threatening respiratory failure, with need for invasive mechanical ventilatory support. frequent adjustments to ventilator and repeat abgs are required.   - repeat abg  - intubation may be required  - analgosedation  - continue with current medical management  - +/- antibiotics, with leukocytosis could be reactive or from chronic use. No fevers  - relative hypotension could be from pulmonary HTN and increased positive pressure ventilation; would follow. If intubation is required I would intubate with vasopressors until we understand the hemodynamic changes from such interventions.

## 2025-07-01 NOTE — PROGRESS NOTE ADULT - PROBLEM SELECTOR PLAN 3
PNA/hemoptysis     - s/p bronch with blood in the RUL. AC held.     - weaned off droxidopa/midodrine. Weaning steroids as tolerated. will need a prolonged taper. Today, patient on hydrocortisone 25mg IV q12hr.     - completed zosyn 6/29.     - Pulm/ID following

## 2025-07-01 NOTE — PROGRESS NOTE ADULT - SUBJECTIVE AND OBJECTIVE BOX
[facility] -- Sai Valle Pannella, Patel, Savella Goodger, Cohen  Office # [phone]      Follow Up:    Resp failure, hypotension   Subjective/Observations:   No events overnight resting comfortably in bed.  No complaints of chest pain, dyspnea, or palpitations reported. No signs of orthopnea or PND.  remains NC , overall feeling better today     REVIEW OF SYSTEMS: All other review of systems is negative unless indicated above    PAST MEDICAL & SURGICAL HISTORY:  COPD (chronic obstructive pulmonary disease)      DM (diabetes mellitus)  diet-controlled      PAF (paroxysmal atrial fibrillation)  s/p ablation      Hiatal hernia      H/O aplastic anemia      History of IBS      H/O osteoporosis      HLD (hyperlipidemia)      PMR (polymyalgia rheumatica)      History of basal cell cancer      Chronic kidney disease (CKD)      Hypotension      Presence of IVC filter      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      History of immunodeficiency      Lumbar compression fracture      H/O hiatal hernia      H/O pulmonary fibrosis      H/O sinus bradycardia      History of fracture of right hip  "unoperable"      S/P hysterectomy      S/P foot surgery      S/P knee surgery      After cataract  bilateral eye cataract surgically removed      Closed right hip fracture  rigth hip ORIF 2016      S/P IVC filter  2016      S/P carpal tunnel release  Right  & 17      H/O kyphoplasty      Port-A-Cath in place  right chest          MEDICATIONS  (STANDING):  albuterol/ipratropium for Nebulization 3 milliLiter(s) Nebulizer three times a day  aMIOdarone    Tablet 100 milliGRAM(s) Oral daily  chlorhexidine 2% Cloths 1 Application(s) Topical <User Schedule>  dextrose 5%. 1000 milliLiter(s) (50 mL/Hr) IV Continuous <Continuous>  dextrose 5%. 1000 milliLiter(s) (100 mL/Hr) IV Continuous <Continuous>  dextrose 50% Injectable 12.5 Gram(s) IV Push once  dextrose 50% Injectable 25 Gram(s) IV Push once  dextrose 50% Injectable 25 Gram(s) IV Push once  fluticasone propionate/ salmeterol 500-50 MICROgram(s) Diskus 1 Dose(s) Inhalation two times a day  furosemide   Injectable 40 milliGRAM(s) IV Push daily  glucagon  Injectable 1 milliGRAM(s) IntraMuscular once  hydrocortisone sodium succinate Injectable 25 milliGRAM(s) IV Push every 12 hours  insulin lispro (ADMELOG) corrective regimen sliding scale   SubCutaneous three times a day before meals  insulin lispro (ADMELOG) corrective regimen sliding scale   SubCutaneous at bedtime  melatonin 5 milliGRAM(s) Oral at bedtime  pantoprazole  Injectable 40 milliGRAM(s) IV Push daily  sodium chloride 3%  Inhalation 4 milliLiter(s) Inhalation three times a day    MEDICATIONS  (PRN):  acetaminophen     Tablet .. 650 milliGRAM(s) Oral every 6 hours PRN Mild Pain (1 - 3)  dextrose Oral Gel 15 Gram(s) Oral once PRN Blood Glucose LESS THAN 70 milliGRAM(s)/deciliter      Allergies    No Known Allergies    Intolerances        Vital Signs Last 24 Hrs  T(C): 36.5 (2025 05:08), Max: 36.9 (2025 12:20)  T(F): 97.7 (2025 05:08), Max: 98.4 (2025 12:20)  HR: 72 (2025 05:08) (66 - 97)  BP: 135/69 (2025 05:08) (129/75 - 146/69)  BP(mean): --  RR: 19 (2025 05:08) (18 - 19)  SpO2: 95% (2025 05:08) (95% - 100%)    Parameters below as of 2025 05:08  Patient On (Oxygen Delivery Method): nasal cannula  O2 Flow (L/min): 4      I&O's Summary    2025 07:01  -  2025 07:00  --------------------------------------------------------  IN: 850 mL / OUT: 1250 mL / NET: -400 mL          PHYSICAL EXAM:  TELE: Af  Constitutional: NAD, awake and alert, well-developed  HEENT: Moist Mucous Membranes, Anicteric  Pulmonary: Non-labored, breath sounds are Dim   Cardiovascular: IRRegular, S1 and S2 nl, No murmurs, rubs, gallops or clicks  Gastrointestinal: Bowel Sounds present, soft, nontender.   Lymph: No lymphadenopathy. No peripheral edema.  Skin: No visible rashes or ulcers.  Psych:  Mood & affect appropriate    LABS: All Labs Reviewed:                        8.3    2.50  )-----------( 187      ( 2025 06:30 )             26.1                         9.1    3.98  )-----------( 145      ( 2025 08:10 )             29.2                         7.2    2.55  )-----------( Clumped    ( 2025 06:25 )             22.9     2025 06:30    145    |  100    |  32     ----------------------------<  117    3.5     |  41     |  0.80   2025 08:10    145    |  101    |  32     ----------------------------<  134    3.8     |  40     |  0.97   2025 06:25    143    |  99     |  41     ----------------------------<  135    3.1     |  39     |  1.20     Ca    8.1        2025 06:30  Ca    8.0        2025 08:10  Ca    8.0        2025 06:25  Phos  2.7       2025 08:10  Phos  4.4       2025 06:25  Mg     1.7       2025 08:10  Mg     1.6       2025 06:25    TPro  6.1    /  Alb  3.0    /  TBili  0.4    /  DBili  x      /  AST  30     /  ALT  27     /  AlkPhos  63     2025 06:30  TPro  6.5    /  Alb  3.0    /  TBili  0.4    /  DBili  x      /  AST  18     /  ALT  23     /  AlkPhos  59     2025 08:10  TPro  5.6    /  Alb  2.6    /  TBili  0.4    /  DBili  x      /  AST  7      /  ALT  17     /  AlkPhos  57     2025 06:25             EC Lead ECG:   Ventricular Rate 57 BPM    Atrial Rate 57 BPM    P-R Interval 128 ms    QRS Duration 90 ms    Q-T Interval 508 ms    QTC Calculation(Bazett) 494 ms    P Axis 64 degrees    R Axis 15 degrees    T Axis 84 degrees    Diagnosis Line Sinus bradycardia with premature atrial complexes  Prolonged QT  Abnormal ECG  When compared with ECG of 2025 04:43,  Vent. rate has decreased BY  31 BPM  Borderline criteria for Lateral infarct are no longer present  Non-specific change in ST segment in Inferiorleads (25 @ 09:15)      TRANSTHORACIC ECHOCARDIOGRAM REPORT  ________________________________________________________________________________                                      _______       Pt. Name:       MARTINEZ JONES Study Date:    2025  MRN:            [id number]        YOB: 1940  Accession #:    [id number]       Age:           84 years  Account#:       [id number]      Gender:        F  Heart Rate:                     Height:        62.99 in (160.00 cm)  Rhythm:                         Weight:        147.71 lb (67.00 kg)  Blood Pressure: 109/63 mmHg     BSA/BMI:       1.70 m² / 26.17 kg/m²  ________________________________________________________________________________________  Referring Physician:    [id number] Naresh Vitale  Interpreting Physician: Daniel Jorge M.D.  Primary Sonographer:    Jamal Hayes    CPT:               ECHO TTE WO CON COMP W DOPP - 06471.m  Indication(s):     Shock, unspecified - R57.9  Procedure:         Transthoracic echocardiogram with 2-D, M-mode and complete                     spectral and color flow Doppler.  Ordering Location: ICU1  Admission Status:  Inpatient  Study Information: Image quality for this study is technically difficult.    _______________________________________________________________________________________     CONCLUSIONS:      1. Technically difficult image quality.   2. Left ventricular systolic function is normal with an ejection fraction of 67 % by Arias's method of disks.   3. Mild left ventricular hypertrophy.   4.Normal right ventricular cavity size and probably normal right ventricular systolic function.   5. Tricuspid aortic valve with normal leaflet excursion. There is calcification of the aortic valve leaflets.   6. Moderate mitral valve leaflet calcification.   7. Mild mitral regurgitation.   8. Moderate tricuspid regurgitation.   9. The inferior vena cava is normal in size measuring 1.64 cm in diameter, (normal <2.1cm) with normal inspiratory collapse (normal >50%) consistent with normal right atrial pressure (~3, range 0-5mmHg).  10. Estimated pulmonary artery systolic pressure is 57 mmHg, consistent with moderate-to-severe pulmonary hypertension.  11. Trace pericardial effusion.  12. Right pleural effusion noted.  13. Compared to the transthoracic echocardiogram performed on 2025, there have been no significant interval changes.    ________________________________________________________________________________________  FINDINGS:     Left Ventricle:  Left ventricular systolic function is normal with a calculated ejection fraction of 67 % by the Arias's biplane method of disks. Mild left ventricular hypertrophy.     Right Ventricle:  The right ventricular cavity is normal in size and right ventricular systolic function is probablynormal. Tricuspid annular plane systolic excursion (TAPSE) is 1.9 cm (normal >=1.7 cm).     Left Atrium:  The left atrium is normal in size with an indexed volume of 29.06 ml/m².     Right Atrium:  The right atrium is normal in size with an indexed volume of 26.00 ml/m² and an indexed area of 9.41 cm²/m².     Interatrial Septum:  The interatrial septum appears intact.     Aortic Valve:  The aortic valve is tricuspid with normal leaflet excursion. There is calcification of the aortic valve leaflets. There is mild thickening of the aortic valve leaflets. There is trace aortic regurgitation.     Mitral Valve:  Structurally normal mitral valve with normal leaflet excursion. There is calcification of the mitral valve annulus. There is moderate leaflet calcification. There is mild mitral regurgitation.     Tricuspid Valve:  The tricuspid valve is structurally normal with normal leaflet excursion. There is moderate tricuspid regurgitation. Estimated pulmonary artery systolic pressure is 57 mmHg,consistent with moderate-to-severe pulmonary hypertension.     Pulmonic Valve:  The pulmonic valve was not well visualized. There is trace pulmonic regurgitation.     Aorta:  The aortic root at the sinuses of Valsalva is normal in size, measuring 3.30 cm (indexed 1.94 cm/m²). The ascending aorta is normal in size, measuring 3.00 cm (indexed 1.76 cm/m²). The aortic arch diameter is normal in size, measuring 2.4 cm (indexed 1.41 cm/m²).     Pericardium:  There is a trace pericardial effusion.     Pleura:  Right pleural effusion noted.     Systemic Veins:  The inferior vena cava is normal in size measuring 1.64 cm in diameter, (normal <2.1cm) with normal inspiratory collapse (normal >50%) consistent with normal right atrial pressure (~3, range 0-5mmHg).  ____________________________________________________________________  QUANTITATIVE DATA:  Left Ventricle Measurements: (Indexed to BSA)     IVSd (2D):   1.2 cm  LVPWd (2D):  1.1 cm  LVIDd (2D):  4.2 cm  LVIDs (2D):  2.4 cm  LV Mass:     169 g  99.5 g/m²  LV Vol d, MOD A2C: 38.9 ml 22.88 ml/m²  LV Vol d, MOD A4C: 40.9 ml 24.06 ml/m²  LV Vol d, MOD BP:  40.9 ml 24.04 ml/m²  LV Vol s, MOD A2C: 11.3 ml 6.65 ml/m²  LV Vol s, MOD A4C: 13.4 ml 7.88 ml/m²  LV Vol s, MOD BP:  13.4 ml 7.91 ml/m²  LVOT SV MOD BP:    27.4 ml  LV EF% MOD BP:     67 %     MV E Vmax:    1.54 m/s  MV A Vmax:    0.74 m/s  MV E/A:       2.08  e' lateral:   11.90 cm/s  e' medial:    8.81 cm/s  E/e' lateral: 12.94  E/e' medial:  17.48  E/e' Average: 14.87  MV DT:272 msec    Aorta Measurements: (Normal range) (Indexed to BSA)     Ao Root d     3.30 cm (2.7 - 3.3 cm) 1.94 cm/m²  Ao Asc d, 2D: 3.00  Ao Asc prox:  3.00 cm                1.76 cm/m²  Ao Arch:      2.4 cm            Left Atrium Measurements: (Indexed to BSA)  LA Diam 2D: 3.60 cm         Right Ventricle Measurements: Right Atrial Measurements:     TAPSE:            1.9 cm      RA Vol s, MOD A4C         44.2 ml  RV Base (RVID1):  2.9 cm      RA Vol s, MOD A4C i BSA   26.00 ml/m²  RV Mid (RVID2): 2.5 cm      RA Area s, MOD A4C        16.0 cm²  RV Major (RVID3): 6.4 cm      RA Area s, MOD A4C, i BSA 9.41 cm²/m²       LVOT / RVOT/ Qp/Qs Data: (Indexed to BSA)  LVOT Diameter,s: 2.20 cm  LVOT Area:       3.80 cm²    Mitral Valve Measurements:     MV E Vmax: 1.5 m/s         MR Vmax:          3.80 m/s  MV A Vmax: 0.7 m/s         MR Peak Gradient: 57.8 mmHg  MV E/A:    2.1       Tricuspid Valve Measurements:     TR Vmax:          3.7 m/s  TR Peak Gradient: 54.5 mmHg  RA Pressure:      3 mmHg  PASP:             57 mmHg    ________________________________________________________________________________________  Electronically signed on 2025 at 11:19:51 AM by Daniel Jorge M.D.         *** Final ***      Radiology:

## 2025-07-02 LAB
ALBUMIN SERPL ELPH-MCNC: 3 G/DL — LOW (ref 3.3–5)
ALP SERPL-CCNC: 62 U/L — SIGNIFICANT CHANGE UP (ref 40–120)
ALT FLD-CCNC: 34 U/L — SIGNIFICANT CHANGE UP (ref 12–78)
ANION GAP SERPL CALC-SCNC: 6 MMOL/L — SIGNIFICANT CHANGE UP (ref 5–17)
AST SERPL-CCNC: 15 U/L — SIGNIFICANT CHANGE UP (ref 15–37)
BILIRUB SERPL-MCNC: 0.4 MG/DL — SIGNIFICANT CHANGE UP (ref 0.2–1.2)
BUN SERPL-MCNC: 28 MG/DL — HIGH (ref 7–23)
CALCIUM SERPL-MCNC: 8.3 MG/DL — LOW (ref 8.5–10.1)
CHLORIDE SERPL-SCNC: 101 MMOL/L — SIGNIFICANT CHANGE UP (ref 96–108)
CO2 SERPL-SCNC: 37 MMOL/L — HIGH (ref 22–31)
CREAT SERPL-MCNC: 0.92 MG/DL — SIGNIFICANT CHANGE UP (ref 0.5–1.3)
EGFR: 62 ML/MIN/1.73M2 — SIGNIFICANT CHANGE UP
EGFR: 62 ML/MIN/1.73M2 — SIGNIFICANT CHANGE UP
GLUCOSE SERPL-MCNC: 102 MG/DL — HIGH (ref 70–99)
HCT VFR BLD CALC: 25.9 % — LOW (ref 34.5–45)
HGB BLD-MCNC: 8 G/DL — LOW (ref 11.5–15.5)
MCHC RBC-ENTMCNC: 29.4 PG — SIGNIFICANT CHANGE UP (ref 27–34)
MCHC RBC-ENTMCNC: 30.9 G/DL — LOW (ref 32–36)
MCV RBC AUTO: 95.2 FL — SIGNIFICANT CHANGE UP (ref 80–100)
NRBC # BLD AUTO: 0 K/UL — SIGNIFICANT CHANGE UP (ref 0–0)
NRBC # FLD: 0 K/UL — SIGNIFICANT CHANGE UP (ref 0–0)
NRBC BLD AUTO-RTO: 0 /100 WBCS — SIGNIFICANT CHANGE UP (ref 0–0)
PLATELET # BLD AUTO: 106 K/UL — LOW (ref 150–400)
PMV BLD: 12.5 FL — SIGNIFICANT CHANGE UP (ref 7–13)
POTASSIUM SERPL-MCNC: 3.7 MMOL/L — SIGNIFICANT CHANGE UP (ref 3.5–5.3)
POTASSIUM SERPL-SCNC: 3.7 MMOL/L — SIGNIFICANT CHANGE UP (ref 3.5–5.3)
PROT SERPL-MCNC: 5.7 G/DL — LOW (ref 6–8.3)
RBC # BLD: 2.72 M/UL — LOW (ref 3.8–5.2)
RBC # FLD: 16.8 % — HIGH (ref 10.3–14.5)
SODIUM SERPL-SCNC: 144 MMOL/L — SIGNIFICANT CHANGE UP (ref 135–145)
WBC # BLD: 2.37 K/UL — LOW (ref 3.8–10.5)
WBC # FLD AUTO: 2.37 K/UL — LOW (ref 3.8–10.5)

## 2025-07-02 PROCEDURE — 87798 DETECT AGENT NOS DNA AMP: CPT

## 2025-07-02 PROCEDURE — 93970 EXTREMITY STUDY: CPT

## 2025-07-02 PROCEDURE — 86850 RBC ANTIBODY SCREEN: CPT

## 2025-07-02 PROCEDURE — 86923 COMPATIBILITY TEST ELECTRIC: CPT

## 2025-07-02 PROCEDURE — 99233 SBSQ HOSP IP/OBS HIGH 50: CPT | Mod: GC

## 2025-07-02 PROCEDURE — 86140 C-REACTIVE PROTEIN: CPT

## 2025-07-02 PROCEDURE — 86038 ANTINUCLEAR ANTIBODIES: CPT

## 2025-07-02 PROCEDURE — 85014 HEMATOCRIT: CPT

## 2025-07-02 PROCEDURE — 85027 COMPLETE CBC AUTOMATED: CPT

## 2025-07-02 PROCEDURE — 83036 HEMOGLOBIN GLYCOSYLATED A1C: CPT

## 2025-07-02 PROCEDURE — 85730 THROMBOPLASTIN TIME PARTIAL: CPT

## 2025-07-02 PROCEDURE — 80053 COMPREHEN METABOLIC PANEL: CPT

## 2025-07-02 PROCEDURE — 87581 M.PNEUMON DNA AMP PROBE: CPT

## 2025-07-02 PROCEDURE — 87116 MYCOBACTERIA CULTURE: CPT

## 2025-07-02 PROCEDURE — 99233 SBSQ HOSP IP/OBS HIGH 50: CPT

## 2025-07-02 PROCEDURE — 85652 RBC SED RATE AUTOMATED: CPT

## 2025-07-02 PROCEDURE — 93005 ELECTROCARDIOGRAM TRACING: CPT

## 2025-07-02 PROCEDURE — 88112 CYTOPATH CELL ENHANCE TECH: CPT

## 2025-07-02 PROCEDURE — 87305 ASPERGILLUS AG IA: CPT

## 2025-07-02 PROCEDURE — 71045 X-RAY EXAM CHEST 1 VIEW: CPT

## 2025-07-02 PROCEDURE — 87086 URINE CULTURE/COLONY COUNT: CPT

## 2025-07-02 PROCEDURE — 87641 MR-STAPH DNA AMP PROBE: CPT

## 2025-07-02 PROCEDURE — 86036 ANCA SCREEN EACH ANTIBODY: CPT

## 2025-07-02 PROCEDURE — 86901 BLOOD TYPING SEROLOGIC RH(D): CPT

## 2025-07-02 PROCEDURE — 93306 TTE W/DOPPLER COMPLETE: CPT

## 2025-07-02 PROCEDURE — 81001 URINALYSIS AUTO W/SCOPE: CPT

## 2025-07-02 PROCEDURE — 97162 PT EVAL MOD COMPLEX 30 MIN: CPT

## 2025-07-02 PROCEDURE — 93931 UPPER EXTREMITY STUDY: CPT

## 2025-07-02 PROCEDURE — 87451 POLYVALENT MULT ORG EA AG IA: CPT

## 2025-07-02 PROCEDURE — 88305 TISSUE EXAM BY PATHOLOGIST: CPT

## 2025-07-02 PROCEDURE — 83880 ASSAY OF NATRIURETIC PEPTIDE: CPT

## 2025-07-02 PROCEDURE — 94660 CPAP INITIATION&MGMT: CPT

## 2025-07-02 PROCEDURE — 80048 BASIC METABOLIC PNL TOTAL CA: CPT

## 2025-07-02 PROCEDURE — 92610 EVALUATE SWALLOWING FUNCTION: CPT

## 2025-07-02 PROCEDURE — 87102 FUNGUS ISOLATION CULTURE: CPT

## 2025-07-02 PROCEDURE — 82962 GLUCOSE BLOOD TEST: CPT

## 2025-07-02 PROCEDURE — A9540: CPT

## 2025-07-02 PROCEDURE — 83735 ASSAY OF MAGNESIUM: CPT

## 2025-07-02 PROCEDURE — 85025 COMPLETE CBC W/AUTO DIFF WBC: CPT

## 2025-07-02 PROCEDURE — 83605 ASSAY OF LACTIC ACID: CPT

## 2025-07-02 PROCEDURE — A9567: CPT

## 2025-07-02 PROCEDURE — 0241U: CPT

## 2025-07-02 PROCEDURE — 86480 TB TEST CELL IMMUN MEASURE: CPT

## 2025-07-02 PROCEDURE — 87449 NOS EACH ORGANISM AG IA: CPT

## 2025-07-02 PROCEDURE — 86146 BETA-2 GLYCOPROTEIN ANTIBODY: CPT

## 2025-07-02 PROCEDURE — 71250 CT THORAX DX C-: CPT

## 2025-07-02 PROCEDURE — 84145 PROCALCITONIN (PCT): CPT

## 2025-07-02 PROCEDURE — 93971 EXTREMITY STUDY: CPT

## 2025-07-02 PROCEDURE — 36415 COLL VENOUS BLD VENIPUNCTURE: CPT

## 2025-07-02 PROCEDURE — 87081 CULTURE SCREEN ONLY: CPT

## 2025-07-02 PROCEDURE — 87205 SMEAR GRAM STAIN: CPT

## 2025-07-02 PROCEDURE — 36430 TRANSFUSION BLD/BLD COMPNT: CPT

## 2025-07-02 PROCEDURE — 82553 CREATINE MB FRACTION: CPT

## 2025-07-02 PROCEDURE — 87252 VIRUS INOCULATION TISSUE: CPT

## 2025-07-02 PROCEDURE — 87640 STAPH A DNA AMP PROBE: CPT

## 2025-07-02 PROCEDURE — 97116 GAIT TRAINING THERAPY: CPT

## 2025-07-02 PROCEDURE — 86225 DNA ANTIBODY NATIVE: CPT

## 2025-07-02 PROCEDURE — 84484 ASSAY OF TROPONIN QUANT: CPT

## 2025-07-02 PROCEDURE — 87594 PNEUMCYSTS JIROVECII AMP PRB: CPT

## 2025-07-02 PROCEDURE — 94760 N-INVAS EAR/PLS OXIMETRY 1: CPT

## 2025-07-02 PROCEDURE — 87899 AGENT NOS ASSAY W/OPTIC: CPT

## 2025-07-02 PROCEDURE — 78582 LUNG VENTILAT&PERFUS IMAGING: CPT

## 2025-07-02 PROCEDURE — 87040 BLOOD CULTURE FOR BACTERIA: CPT

## 2025-07-02 PROCEDURE — 82550 ASSAY OF CK (CPK): CPT

## 2025-07-02 PROCEDURE — 86900 BLOOD TYPING SEROLOGIC ABO: CPT

## 2025-07-02 PROCEDURE — 85610 PROTHROMBIN TIME: CPT

## 2025-07-02 PROCEDURE — 86431 RHEUMATOID FACTOR QUANT: CPT

## 2025-07-02 PROCEDURE — 87077 CULTURE AEROBIC IDENTIFY: CPT

## 2025-07-02 PROCEDURE — 85018 HEMOGLOBIN: CPT

## 2025-07-02 PROCEDURE — 36600 WITHDRAWAL OF ARTERIAL BLOOD: CPT

## 2025-07-02 PROCEDURE — 94640 AIRWAY INHALATION TREATMENT: CPT

## 2025-07-02 PROCEDURE — P9040: CPT

## 2025-07-02 PROCEDURE — 97530 THERAPEUTIC ACTIVITIES: CPT

## 2025-07-02 PROCEDURE — 86147 CARDIOLIPIN ANTIBODY EA IG: CPT

## 2025-07-02 PROCEDURE — 83516 IMMUNOASSAY NONANTIBODY: CPT

## 2025-07-02 PROCEDURE — 87070 CULTURE OTHR SPECIMN AEROBIC: CPT

## 2025-07-02 PROCEDURE — 82803 BLOOD GASES ANY COMBINATION: CPT

## 2025-07-02 PROCEDURE — 97110 THERAPEUTIC EXERCISES: CPT

## 2025-07-02 PROCEDURE — 84100 ASSAY OF PHOSPHORUS: CPT

## 2025-07-02 RX ADMIN — Medication 40 MILLIGRAM(S): at 12:29

## 2025-07-02 RX ADMIN — IPRATROPIUM BROMIDE AND ALBUTEROL SULFATE 3 MILLILITER(S): .5; 2.5 SOLUTION RESPIRATORY (INHALATION) at 19:17

## 2025-07-02 RX ADMIN — AMIODARONE HYDROCHLORIDE 100 MILLIGRAM(S): 50 INJECTION, SOLUTION INTRAVENOUS at 05:24

## 2025-07-02 RX ADMIN — Medication 4 MILLILITER(S): at 07:54

## 2025-07-02 RX ADMIN — Medication 1 DOSE(S): at 05:23

## 2025-07-02 RX ADMIN — Medication 4 MILLILITER(S): at 14:13

## 2025-07-02 RX ADMIN — FUROSEMIDE 40 MILLIGRAM(S): 10 INJECTION INTRAMUSCULAR; INTRAVENOUS at 05:24

## 2025-07-02 RX ADMIN — Medication 4 MILLILITER(S): at 19:17

## 2025-07-02 RX ADMIN — Medication 25 MILLIGRAM(S): at 05:24

## 2025-07-02 RX ADMIN — IPRATROPIUM BROMIDE AND ALBUTEROL SULFATE 3 MILLILITER(S): .5; 2.5 SOLUTION RESPIRATORY (INHALATION) at 07:54

## 2025-07-02 RX ADMIN — Medication 1 DOSE(S): at 21:16

## 2025-07-02 RX ADMIN — Medication 1 APPLICATION(S): at 05:27

## 2025-07-02 RX ADMIN — Medication 25 MILLIGRAM(S): at 17:36

## 2025-07-02 RX ADMIN — Medication 5 MILLIGRAM(S): at 21:17

## 2025-07-02 RX ADMIN — IPRATROPIUM BROMIDE AND ALBUTEROL SULFATE 3 MILLILITER(S): .5; 2.5 SOLUTION RESPIRATORY (INHALATION) at 14:13

## 2025-07-02 NOTE — PROGRESS NOTE ADULT - SUBJECTIVE AND OBJECTIVE BOX
[facility]                                                       Dr. Chisholm, Dr. Franklin, Dr. Baron, Dr. Clark, Dr. Stock                                      [facility], [facility], [address]                                                 [address] [place] [address]                                      Ph: [phone]  Fax: [phone]                                         Ph: [phone]  Fax: [phone]      Patient is a 84y old  Female who presents with a chief complaint of septic shock, PNA (09 Jun 2025 12:36)  Patient seen in follow up for KIMBERLY on CKD.        PAST MEDICAL HISTORY:  COPD (chronic obstructive pulmonary disease)    DM (diabetes mellitus)    Pulmonary fibrosis    PAF (paroxysmal atrial fibrillation)    Hiatal hernia    GIB (gastrointestinal bleeding)    Pericarditis    Shoulder fracture, right    Hematoma    H/O aplastic anemia    History of IBS    H/O osteoporosis    HLD (hyperlipidemia)    PMR (polymyalgia rheumatica)    History of basal cell cancer    Chronic kidney disease (CKD)    Hypotension    Presence of IVC filter    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    History of immunodeficiency    Lumbar compression fracture    H/O hiatal hernia    H/O pulmonary fibrosis    H/O sinus bradycardia    History of fracture of right hip       MEDICATIONS  (STANDING):  albuterol/ipratropium for Nebulization 3 milliLiter(s) Nebulizer three times a day  aMIOdarone    Tablet 100 milliGRAM(s) Oral daily  chlorhexidine 2% Cloths 1 Application(s) Topical <User Schedule>  dextrose 5%. 1000 milliLiter(s) (50 mL/Hr) IV Continuous <Continuous>  dextrose 5%. 1000 milliLiter(s) (100 mL/Hr) IV Continuous <Continuous>  dextrose 50% Injectable 12.5 Gram(s) IV Push once  dextrose 50% Injectable 25 Gram(s) IV Push once  dextrose 50% Injectable 25 Gram(s) IV Push once  fluticasone propionate/ salmeterol 500-50 MICROgram(s) Diskus 1 Dose(s) Inhalation two times a day  furosemide   Injectable 40 milliGRAM(s) IV Push daily  glucagon  Injectable 1 milliGRAM(s) IntraMuscular once  hydrocortisone sodium succinate Injectable 25 milliGRAM(s) IV Push every 12 hours  insulin lispro (ADMELOG) corrective regimen sliding scale   SubCutaneous three times a day before meals  insulin lispro (ADMELOG) corrective regimen sliding scale   SubCutaneous at bedtime  melatonin 5 milliGRAM(s) Oral at bedtime  pantoprazole  Injectable 40 milliGRAM(s) IV Push daily  sodium chloride 3%  Inhalation 4 milliLiter(s) Inhalation three times a day    MEDICATIONS  (PRN):  acetaminophen     Tablet .. 650 milliGRAM(s) Oral every 6 hours PRN Mild Pain (1 - 3)  dextrose Oral Gel 15 Gram(s) Oral once PRN Blood Glucose LESS THAN 70 milliGRAM(s)/deciliter    T(C): 36.8 (07-02-25 @ 05:26), Max: 36.8 (07-02-25 @ 05:26)  HR: 77 (07-02-25 @ 07:45) (66 - 97)  BP: 127/80 (07-02-25 @ 05:26) (120/63 - 146/69)  RR: 18 (07-02-25 @ 05:26)  SpO2: 100% (07-02-25 @ 07:45)  Wt(kg): --  I&O's Detail    01 Jul 2025 07:01  -  02 Jul 2025 07:00  --------------------------------------------------------  IN:    Oral Fluid: 200 mL  Total IN: 200 mL    OUT:    Voided (mL): 500 mL  Total OUT: 500 mL    Total NET: -300 mL                      PHYSICAL EXAM:  General: No distress  Respiratory: b/l air entry  Cardiovascular: S1 S2  Gastrointestinal: soft  Extremities:  edema          LABORATORY:                        8.0    2.37  )-----------( 106      ( 02 Jul 2025 06:55 )             25.9     07-02    144  |  101  |  28[H]  ----------------------------<  102[H]  3.7   |  37[H]  |  0.92    Ca    8.3[L]      02 Jul 2025 06:55    TPro  5.7[L]  /  Alb  3.0[L]  /  TBili  0.4  /  DBili  x   /  AST  15  /  ALT  34  /  AlkPhos  62  07-02    Sodium: 144 mmol/L (07-02 @ 06:55)  Sodium: 145 mmol/L (07-01 @ 06:30)    Potassium: 3.7 mmol/L (07-02 @ 06:55)  Potassium: 3.5 mmol/L (07-01 @ 06:30)    Hemoglobin: 8.0 g/dL (07-02 @ 06:55)  Hemoglobin: 8.3 g/dL (07-01 @ 06:30)  Hemoglobin: 9.1 g/dL (06-30 @ 08:10)    Creatinine, Serum 0.92 (07-02 @ 06:55)  Creatinine, Serum 0.80 (07-01 @ 06:30)  Creatinine, Serum 0.97 (06-30 @ 08:10)        LIVER FUNCTIONS - ( 02 Jul 2025 06:55 )  Alb: 3.0 g/dL / Pro: 5.7 g/dL / ALK PHOS: 62 U/L / ALT: 34 U/L / AST: 15 U/L / GGT: x           Urinalysis Basic - ( 02 Jul 2025 06:55 )    Color: x / Appearance: x / SG: x / pH: x  Gluc: 102 mg/dL / Ketone: x  / Bili: x / Urobili: x   Blood: x / Protein: x / Nitrite: x   Leuk Esterase: x / RBC: x / WBC x   Sq Epi: x / Non Sq Epi: x / Bacteria: x

## 2025-07-02 NOTE — PROGRESS NOTE ADULT - PROBLEM SELECTOR PLAN 3
PNA/hemoptysis   - s/p bronch with blood in the RUL on 6/13. AC held since 6/13  - Weaned off droxidopa/midodrine from hypotension  - Weaning steroids as tolerated; patient currently on hydrocortisone 25mg IV q12hr  - Completed zosyn 6/29  - Pulm/ID following

## 2025-07-02 NOTE — PROGRESS NOTE ADULT - ASSESSMENT
84 female PMH of A-fib on Eliquis, COPD, CKD, aplastic anemia, polymyalgia rheumatica, on chronic steroids, avascular necrosis of hips, HLD, HTN, DM, recent admission to [facility] 5/26 through 6/5/2025 for CHF/fluid overload/COPD exacerbation, presents to the ED form [facility] for evaluation of fever and generalized feeling of being unwell, found to be septic and hypotensive.     Sepsis, Hypotension, AHRF, PAfib  - CT chest 6/10 with multifocal R sided PNA  - hx copd on home o2 , baseline o2 keep > 88%.  Downtitrate as needed.  On 4L, reduced to 3L  - S/p bronch, 6/16.  Showed blood oozing from RUL but no lesion.  No further hemoptysis  - Pulm folowing  - Incentive spirometer    - 6/22 had AMS from CO2 narcosis.   - s/p BIPAP.  Compliance with cpap recommended    - c/o atypical CP Appears pleuritic  - EKG x 2 showed NSR with PAC, non-ischemic.    - Troponin negative    - Known PAfib, on AC  - Pls, resume home Eliquis once cleared by Pulm  - NSR with short PAT's on tele.  Can D/C  - Continue Amiodarone    - Echo 6/12/25 as above EF 67% mild LVH, mod MAC, mild MR, mod to severe pHTN  - No evidence of any meaningful volume overload   - Can switch IV Lasix to home Torsemide     - Bp remains stable    - Now off Midodrine and Northera  - Known hx of dysautonomia  - BP stable and controlled     - Monitor and replete lytes, keep K>4, Mg>2.  - Will continue to follow.    Shaneka Rogers DNP, NP-C, AGACNP-C  Cardiology   Call TEAMS

## 2025-07-02 NOTE — PROGRESS NOTE ADULT - SUBJECTIVE AND OBJECTIVE BOX
CHIEF COMPLAINT/ REASON FOR VISIT  .. Patient was seen to address the  issue listed under PROBLEM LIST which is located toward bottom of this note     MARTINEZ PATEL TELN 89 D1    Allergies    No Known Allergies    Intolerances        PAST MEDICAL & SURGICAL HISTORY:  COPD (chronic obstructive pulmonary disease)      DM (diabetes mellitus)  diet-controlled      PAF (paroxysmal atrial fibrillation)  s/p ablation      Hiatal hernia      H/O aplastic anemia      History of IBS      H/O osteoporosis      HLD (hyperlipidemia)      PMR (polymyalgia rheumatica)      History of basal cell cancer      Chronic kidney disease (CKD)      Hypotension      Presence of IVC filter      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      History of immunodeficiency      Lumbar compression fracture      H/O hiatal hernia      H/O pulmonary fibrosis      H/O sinus bradycardia      History of fracture of right hip  "unoperable"      S/P hysterectomy      S/P foot surgery      S/P knee surgery      After cataract  bilateral eye cataract surgically removed      Closed right hip fracture  rigth hip ORIF july 19 2016      S/P IVC filter  nov 2016      S/P carpal tunnel release  Right 2008 & 6/27/17      H/O kyphoplasty      Port-A-Cath in place  right chest          FAMILY HISTORY:  Family history of bladder cancer (Mother)    Family history of myocardial infarction (Father)    FH: atrial fibrillation (Father)        Home Medications:  amiodarone 200 mg oral tablet: 0.5 tab(s) orally once a day (08 Jun 2025 16:43)  Bentyl 10 mg oral capsule: 1 cap(s) orally 2 times a day, As Needed (08 Jun 2025 16:43)  Eliquis 2.5 mg oral tablet: 1 tab(s) orally 2 times a day (08 Jun 2025 16:43)  fluticasone-salmeterol 500 mcg-50 mcg/inh inhalation powder: 1 puff(s) inhaled 2 times a day (08 Jun 2025 16:43)  folic acid 1 mg oral tablet: 1 tab(s) orally once a day (in the morning) (08 Jun 2025 16:43)  gabapentin 400 mg oral capsule: 1 cap(s) orally 2 times a day (08 Jun 2025 16:43)  ipratropium-albuterol 0.5 mg-2.5 mg/3 mL inhalation solution: 3 milliliter(s) inhaled every 6 hours As needed Shortness of Breath and/or Wheezing (08 Jun 2025 16:43)  IVIG monthly:  (08 Jun 2025 16:43)  leflunomide 10 mg oral tablet: 1 tab(s) orally once a day (08 Jun 2025 16:43)  midodrine 10 mg oral tablet: 1 tab(s) orally every 8 hours (08 Jun 2025 16:43)  montelukast 10 mg oral tablet: 1 tab(s) orally once a day (08 Jun 2025 16:43)  pantoprazole 40 mg oral delayed release tablet: 1 tab(s) orally once a day (before a meal) (08 Jun 2025 16:43)  predniSONE 5 mg oral tablet: 1 tab(s) orally once a day (08 Jun 2025 16:43)  Procrit 10,000 units/mL preservative-free injectable solution: injectable once a week WEDNESDAY (08 Jun 2025 16:43)  Reclast Infusion , IV x 1 yearly:  (08 Jun 2025 16:43)  simvastatin 10 mg oral tablet: 1 tab(s) orally once a day (at bedtime) (08 Jun 2025 16:43)  tiotropium 2.5 mcg/inh inhalation aerosol: 2 puff(s) inhaled once a day (08 Jun 2025 16:43)  tiZANidine: 2 tab(s) orally once a day (at bedtime) as needed for  muscle spasm (08 Jun 2025 16:43)  torsemide 20 mg oral tablet: 1 tab(s) orally once a day (in the morning) (08 Jun 2025 16:49)      MEDICATIONS  (STANDING):  albuterol/ipratropium for Nebulization 3 milliLiter(s) Nebulizer three times a day  aMIOdarone    Tablet 100 milliGRAM(s) Oral daily  chlorhexidine 2% Cloths 1 Application(s) Topical <User Schedule>  dextrose 5%. 1000 milliLiter(s) (50 mL/Hr) IV Continuous <Continuous>  dextrose 5%. 1000 milliLiter(s) (100 mL/Hr) IV Continuous <Continuous>  dextrose 50% Injectable 12.5 Gram(s) IV Push once  dextrose 50% Injectable 25 Gram(s) IV Push once  dextrose 50% Injectable 25 Gram(s) IV Push once  fluticasone propionate/ salmeterol 500-50 MICROgram(s) Diskus 1 Dose(s) Inhalation two times a day  furosemide   Injectable 40 milliGRAM(s) IV Push daily  glucagon  Injectable 1 milliGRAM(s) IntraMuscular once  hydrocortisone sodium succinate Injectable 25 milliGRAM(s) IV Push every 12 hours  insulin lispro (ADMELOG) corrective regimen sliding scale   SubCutaneous three times a day before meals  insulin lispro (ADMELOG) corrective regimen sliding scale   SubCutaneous at bedtime  melatonin 5 milliGRAM(s) Oral at bedtime  pantoprazole  Injectable 40 milliGRAM(s) IV Push daily  sodium chloride 3%  Inhalation 4 milliLiter(s) Inhalation three times a day    MEDICATIONS  (PRN):  acetaminophen     Tablet .. 650 milliGRAM(s) Oral every 6 hours PRN Mild Pain (1 - 3)  dextrose Oral Gel 15 Gram(s) Oral once PRN Blood Glucose LESS THAN 70 milliGRAM(s)/deciliter      Diet, Regular:   DASH/TLC Sodium & Cholesterol Restricted (06-23-25 @ 13:52) [Active]          Vital Signs Last 24 Hrs  T(C): 36.8 (02 Jul 2025 05:26), Max: 36.8 (02 Jul 2025 05:26)  T(F): 98.3 (02 Jul 2025 05:26), Max: 98.3 (02 Jul 2025 05:26)  HR: 69 (02 Jul 2025 05:26) (68 - 82)  BP: 127/80 (02 Jul 2025 05:26) (120/63 - 127/80)  BP(mean): --  RR: 18 (02 Jul 2025 05:26) (18 - 19)  SpO2: 99% (02 Jul 2025 05:26) (96% - 100%)    Parameters below as of 02 Jul 2025 05:26  Patient On (Oxygen Delivery Method): nasal cannula  O2 Flow (L/min): 4        07-01-25 @ 07:01  -  07-02-25 @ 07:00  --------------------------------------------------------  IN: 200 mL / OUT: 500 mL / NET: -300 mL              LABS:                        8.0    2.37  )-----------( 106      ( 02 Jul 2025 06:55 )             25.9     07-02    144  |  101  |  28[H]  ----------------------------<  102[H]  3.7   |  37[H]  |  0.92    Ca    8.3[L]      02 Jul 2025 06:55    TPro  5.7[L]  /  Alb  3.0[L]  /  TBili  0.4  /  DBili  x   /  AST  15  /  ALT  34  /  AlkPhos  62  07-02      Urinalysis Basic - ( 02 Jul 2025 06:55 )    Color: x / Appearance: x / SG: x / pH: x  Gluc: 102 mg/dL / Ketone: x  / Bili: x / Urobili: x   Blood: x / Protein: x / Nitrite: x   Leuk Esterase: x / RBC: x / WBC x   Sq Epi: x / Non Sq Epi: x / Bacteria: x            WBC:  WBC Count: 2.37 K/uL (07-02 @ 06:55)  WBC Count: 2.50 K/uL (07-01 @ 06:30)  WBC Count: 3.98 K/uL (06-30 @ 08:10)  WBC Count: 2.55 K/uL (06-29 @ 06:25)      MICROBIOLOGY:  RECENT CULTURES:  06-28 Urine XXXX XXXX   <10,000 CFU/mL Normal Urogenital Ginger                    Sodium:  Sodium: 144 mmol/L (07-02 @ 06:55)  Sodium: 145 mmol/L (07-01 @ 06:30)  Sodium: 145 mmol/L (06-30 @ 08:10)  Sodium: 143 mmol/L (06-29 @ 06:25)      0.92 mg/dL 07-02 @ 06:55  0.80 mg/dL 07-01 @ 06:30  0.97 mg/dL 06-30 @ 08:10  1.20 mg/dL 06-29 @ 06:25      Hemoglobin:  Hemoglobin: 8.0 g/dL (07-02 @ 06:55)  Hemoglobin: 8.3 g/dL (07-01 @ 06:30)  Hemoglobin: 9.1 g/dL (06-30 @ 08:10)  Hemoglobin: 7.2 g/dL (06-29 @ 06:25)      Platelets: Platelet Count - Automated: 106 K/uL (07-02 @ 06:55)  Platelet Count - Automated: 187 K/uL (07-01 @ 06:30)  Platelet Count - Automated: 145 K/uL (06-30 @ 08:10)  Platelet Count - Automated: Clumped (06-29 @ 06:25)      LIVER FUNCTIONS - ( 02 Jul 2025 06:55 )  Alb: 3.0 g/dL / Pro: 5.7 g/dL / ALK PHOS: 62 U/L / ALT: 34 U/L / AST: 15 U/L / GGT: x             Urinalysis Basic - ( 02 Jul 2025 06:55 )    Color: x / Appearance: x / SG: x / pH: x  Gluc: 102 mg/dL / Ketone: x  / Bili: x / Urobili: x   Blood: x / Protein: x / Nitrite: x   Leuk Esterase: x / RBC: x / WBC x   Sq Epi: x / Non Sq Epi: x / Bacteria: x        RADIOLOGY & ADDITIONAL STUDIES:      MICROBIOLOGY:  RECENT CULTURES:  06-28 Urine XXXX XXXX   <10,000 CFU/mL Normal Urogenital Ginger

## 2025-07-02 NOTE — PROGRESS NOTE ADULT - PROBLEM SELECTOR PLAN 1
Patient is s/p RRT 6/28 due to symptoms of hypothermia, hypotension, and lethargy while not using BIPAP overnight  - Currently on 4L NC (baseline 2L NC at home)  - Wean to keep pulse ox >88%   - On nightly BIPAP/CPAP; Educated to stay on for full 8 hours to prevent ARF  - C/w Advair, Duoneb

## 2025-07-02 NOTE — PROGRESS NOTE ADULT - SUBJECTIVE AND OBJECTIVE BOX
Patient is a 84y old Female who presents with a chief complaint of septic shock, PNA (02 Jul 2025 12:22)    INTERVAL HPI/OVERNIGHT EVENTS: Patient was seen and examined at bedside. No overnight events occurred. Reports lightheadedness when ambulating from bed to chair. Had a normal bowel movement last night. Patient denies fever, chills, headache, SOB, chest pain, abdominal pain, n/v/d/c.      MEDICATIONS  (STANDING):  albuterol/ipratropium for Nebulization 3 milliLiter(s) Nebulizer three times a day  aMIOdarone    Tablet 100 milliGRAM(s) Oral daily  chlorhexidine 2% Cloths 1 Application(s) Topical <User Schedule>  dextrose 5%. 1000 milliLiter(s) (50 mL/Hr) IV Continuous <Continuous>  dextrose 5%. 1000 milliLiter(s) (100 mL/Hr) IV Continuous <Continuous>  dextrose 50% Injectable 12.5 Gram(s) IV Push once  dextrose 50% Injectable 25 Gram(s) IV Push once  dextrose 50% Injectable 25 Gram(s) IV Push once  fluticasone propionate/ salmeterol 500-50 MICROgram(s) Diskus 1 Dose(s) Inhalation two times a day  furosemide   Injectable 40 milliGRAM(s) IV Push daily  glucagon  Injectable 1 milliGRAM(s) IntraMuscular once  hydrocortisone sodium succinate Injectable 25 milliGRAM(s) IV Push every 12 hours  insulin lispro (ADMELOG) corrective regimen sliding scale   SubCutaneous three times a day before meals  insulin lispro (ADMELOG) corrective regimen sliding scale   SubCutaneous at bedtime  melatonin 5 milliGRAM(s) Oral at bedtime  pantoprazole  Injectable 40 milliGRAM(s) IV Push daily  sodium chloride 3%  Inhalation 4 milliLiter(s) Inhalation three times a day    MEDICATIONS  (PRN):  acetaminophen     Tablet .. 650 milliGRAM(s) Oral every 6 hours PRN Mild Pain (1 - 3)  dextrose Oral Gel 15 Gram(s) Oral once PRN Blood Glucose LESS THAN 70 milliGRAM(s)/deciliter      Allergies    No Known Allergies    Intolerances        REVIEW OF SYSTEMS:  CONSTITUTIONAL: No fever or chills  HEENT: No headache  RESPIRATORY: No cough, wheezing, or shortness of breath on 4L NC  CARDIOVASCULAR: No chest pain, palpitations  GASTROINTESTINAL: No abd pain, nausea, vomiting, or diarrhea  GENITOURINARY: No dysuria, frequency, or hematuria  NEUROLOGICAL: no focal weakness; +lightheadedness   MUSCULOSKELETAL: no myalgias     Vital Signs Last 24 Hrs  T(C): 36.9 (02 Jul 2025 11:56), Max: 36.9 (02 Jul 2025 11:56)  T(F): 98.5 (02 Jul 2025 11:56), Max: 98.5 (02 Jul 2025 11:56)  HR: 77 (02 Jul 2025 07:45) (68 - 82)  BP: 127/80 (02 Jul 2025 05:26) (120/63 - 127/80)  BP(mean): --  RR: 18 (02 Jul 2025 05:26) (18 - 18)  SpO2: 100% (02 Jul 2025 07:45) (96% - 100%)    Parameters below as of 02 Jul 2025 07:45  Patient On (Oxygen Delivery Method): nasal cannula, 4L        PHYSICAL EXAM:  GENERAL: NAD  HEENT:  anicteric, moist mucous membranes  CHEST/LUNG:  CTA b/l, no rales, wheezes, or rhonchi  HEART:  RRR, S1, S2  ABDOMEN:  BS+, soft, nontender, nondistended  EXTREMITIES: no edema, cyanosis, or calf tenderness  NERVOUS SYSTEM: answers questions and follows commands appropriately    LABS:                        8.0    2.37  )-----------( 106      ( 02 Jul 2025 06:55 )             25.9     CBC Full  -  ( 02 Jul 2025 06:55 )  WBC Count : 2.37 K/uL  Hemoglobin : 8.0 g/dL  Hematocrit : 25.9 %  Platelet Count - Automated : 106 K/uL  Mean Cell Volume : 95.2 fl  Mean Cell Hemoglobin : 29.4 pg  Mean Cell Hemoglobin Concentration : 30.9 g/dL  Auto Neutrophil # : x  Auto Lymphocyte # : x  Auto Monocyte # : x  Auto Eosinophil # : x  Auto Basophil # : x  Auto Neutrophil % : x  Auto Lymphocyte % : x  Auto Monocyte % : x  Auto Eosinophil % : x  Auto Basophil % : x    02 Jul 2025 06:55    144    |  101    |  28     ----------------------------<  102    3.7     |  37     |  0.92     Ca    8.3        02 Jul 2025 06:55    TPro  5.7    /  Alb  3.0    /  TBili  0.4    /  DBili  x      /  AST  15     /  ALT  34     /  AlkPhos  62     02 Jul 2025 06:55      Urinalysis Basic - ( 02 Jul 2025 06:55 )    Color: x / Appearance: x / SG: x / pH: x  Gluc: 102 mg/dL / Ketone: x  / Bili: x / Urobili: x   Blood: x / Protein: x / Nitrite: x   Leuk Esterase: x / RBC: x / WBC x   Sq Epi: x / Non Sq Epi: x / Bacteria: x      CAPILLARY BLOOD GLUCOSE      POCT Blood Glucose.: 105 mg/dL (02 Jul 2025 12:28)  POCT Blood Glucose.: 112 mg/dL (02 Jul 2025 08:22)  POCT Blood Glucose.: 139 mg/dL (01 Jul 2025 21:31)  POCT Blood Glucose.: 100 mg/dL (01 Jul 2025 16:53)        Urinalysis with Rflx Culture (collected 06-28-25 @ 16:20)    Culture - Urine (collected 06-28-25 @ 16:20)  Source: Urine  Final Report (06-30-25 @ 06:49):    <10,000 CFU/mL Normal Urogenital Ginger        RADIOLOGY & ADDITIONAL TESTS:    Personally reviewed.     Consultant(s) Notes Reviewed:  [x] YES  [ ] NO

## 2025-07-02 NOTE — PROGRESS NOTE ADULT - PROBLEM SELECTOR PLAN 8
- On chronic Prednisone 5mg daily at home  - On stress doses of steroids, currently tapering. Currently on hydrocortisone 25mg IV q12hr

## 2025-07-02 NOTE — PROGRESS NOTE ADULT - PROBLEM SELECTOR PLAN 6
- Cardio following  - C/w amiodarone 100mg daily   - C/w Lasix 40 mg IV; May switch to PO. Leg edema improving  - BB held  - Patient has been on midodrine and florinef in the past for orthostatic hypotension  - F/u orthostatics 7/2 due to fluctuations in BP readings and lightheadedness on ambulation

## 2025-07-02 NOTE — PROGRESS NOTE ADULT - PROBLEM SELECTOR PLAN 2
Iatrogenic adrenal insufficiency: cont slow glucocorticoid taper to baseline prednisone dose over 2-3 weeks.

## 2025-07-02 NOTE — PROGRESS NOTE ADULT - PROBLEM SELECTOR PLAN 2
Shock was secondary to adrenal insufficiency. Patient on chronic prednisone for PMR  - On stress dosed steroids. Weaning steroids as tolerated over the course of 2-3 weeks  - Today, patient is on hydrocortisone 25mg IV q12hr  - Endocrinology following (Dr. Perlman)

## 2025-07-02 NOTE — PROGRESS NOTE ADULT - PROBLEM SELECTOR PLAN 4
Patient has history of COPD (not on O2??)  - C/w Claus Owens  - Currently on 4L NC during the day  - On BIPAP/CPAP during the night  - Pulm following

## 2025-07-02 NOTE — PROGRESS NOTE ADULT - PROBLEM SELECTOR PLAN 5
KIMBERLY/CKD3, prerenal azotemia   - F/u renal function trends and electrolytes   - Potassium supplemented on 7/1  - Avoid nephrotoxic medications  - Nephrology following

## 2025-07-02 NOTE — PROGRESS NOTE ADULT - PROBLEM SELECTOR PLAN 7
Aplastic anemia (Hb baseline 7.0-9.2)   - Continue holding home Eliquis and Heparin gtt  - Patient had recurrent hemoptysis with evidence of bleeding on bronch on 6/13  - S/p prbc on 6/23

## 2025-07-02 NOTE — PROGRESS NOTE ADULT - ASSESSMENT
84F h/o AFib on Eliquis, COPD, CKD, aplastic anemia, polymyalgia rheumatica on chronic steroids, avascular necrosis of hips, HTN, HLD, with recent admission to [place] 5/26 - 6/5 for acute HFpEF exacerbation and COPD exacerbation, discharged to rehab on 6/5 and returning today with right sided chest pain, general malaise and feeling unwell. She reports some slight cough though otherwise no other new symptoms reported. Found to be hypotensive, febrile w/ leukocytosis. Admitted to ICU for septic shock likely 2/2 to PNA and KIMBERLY. downgraded to tele. s/p RRT 6/28 for hypothermia, hypotension and lethargy. Patient is being weaned off stress doses of steroids after being treated for adrenal insufficiency.

## 2025-07-02 NOTE — PROGRESS NOTE ADULT - ASSESSMENT
Prerenal azotemia, CKD 3  Dyspnea: Pneumonia, h/o COPD/CHF  s/p Shock   Diabetes  Aplastic anemia, requiring frequent blood transfusions  Hemoptysis    Stable renal indices. H/H stable. To continue current meds. Monitor blood sugar levels. Insulin coverage as needed. Trend BP and titrate BP meds as needed.   Avoid nephrotoxic meds as possible. Pulmonary follow up. Will follow electrolytes and renal function trend. Will follow as needed. D/c planning.

## 2025-07-02 NOTE — PROGRESS NOTE ADULT - PROBLEM SELECTOR PLAN 10
Hx of T2DM, but not on home meds  - C/w low dose Admelog corrective scale coverage  - Endocrinology following

## 2025-07-02 NOTE — PROGRESS NOTE ADULT - SUBJECTIVE AND OBJECTIVE BOX
[facility] -- Sai Valle, Manuel, [place], , Rene Hernandez  Office # [phone]    Follow Up:  Resp failure, hypotension, Afib    Subjective/Observations: Denies further hemoptysis.  NC at 4L/min in use.  Denies SOB or SWAIN.  Denies CP or palpitations.  No significant tele events    REVIEW OF SYSTEMS: All other review of systems is negative unless indicated above  PAST MEDICAL & SURGICAL HISTORY:  COPD (chronic obstructive pulmonary disease)      DM (diabetes mellitus)  diet-controlled      PAF (paroxysmal atrial fibrillation)  s/p ablation      Hiatal hernia      H/O aplastic anemia      History of IBS      H/O osteoporosis      HLD (hyperlipidemia)      PMR (polymyalgia rheumatica)      History of basal cell cancer      Chronic kidney disease (CKD)      Hypotension      Presence of IVC filter      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      History of immunodeficiency      Lumbar compression fracture      H/O hiatal hernia      H/O pulmonary fibrosis      H/O sinus bradycardia      History of fracture of right hip  "unoperable"      S/P hysterectomy      S/P foot surgery      S/P knee surgery      After cataract  bilateral eye cataract surgically removed      Closed right hip fracture  rigth hip ORIF july 19 2016      S/P IVC filter  nov 2016      S/P carpal tunnel release  Right 2008 & 6/27/17      H/O kyphoplasty      Port-A-Cath in place  right chest        MEDICATIONS  (STANDING):  albuterol/ipratropium for Nebulization 3 milliLiter(s) Nebulizer three times a day  aMIOdarone    Tablet 100 milliGRAM(s) Oral daily  chlorhexidine 2% Cloths 1 Application(s) Topical <User Schedule>  dextrose 5%. 1000 milliLiter(s) (50 mL/Hr) IV Continuous <Continuous>  dextrose 5%. 1000 milliLiter(s) (100 mL/Hr) IV Continuous <Continuous>  dextrose 50% Injectable 12.5 Gram(s) IV Push once  dextrose 50% Injectable 25 Gram(s) IV Push once  dextrose 50% Injectable 25 Gram(s) IV Push once  fluticasone propionate/ salmeterol 500-50 MICROgram(s) Diskus 1 Dose(s) Inhalation two times a day  furosemide   Injectable 40 milliGRAM(s) IV Push daily  glucagon  Injectable 1 milliGRAM(s) IntraMuscular once  hydrocortisone sodium succinate Injectable 25 milliGRAM(s) IV Push every 12 hours  insulin lispro (ADMELOG) corrective regimen sliding scale   SubCutaneous three times a day before meals  insulin lispro (ADMELOG) corrective regimen sliding scale   SubCutaneous at bedtime  melatonin 5 milliGRAM(s) Oral at bedtime  pantoprazole  Injectable 40 milliGRAM(s) IV Push daily  sodium chloride 3%  Inhalation 4 milliLiter(s) Inhalation three times a day    MEDICATIONS  (PRN):  acetaminophen     Tablet .. 650 milliGRAM(s) Oral every 6 hours PRN Mild Pain (1 - 3)  dextrose Oral Gel 15 Gram(s) Oral once PRN Blood Glucose LESS THAN 70 milliGRAM(s)/deciliter    Allergies    No Known Allergies    Intolerances      Vital Signs Last 24 Hrs  T(C): 36.8 (02 Jul 2025 05:26), Max: 36.8 (02 Jul 2025 05:26)  T(F): 98.3 (02 Jul 2025 05:26), Max: 98.3 (02 Jul 2025 05:26)  HR: 77 (02 Jul 2025 07:45) (68 - 82)  BP: 127/80 (02 Jul 2025 05:26) (120/63 - 127/80)  BP(mean): --  RR: 18 (02 Jul 2025 05:26) (18 - 18)  SpO2: 100% (02 Jul 2025 07:45) (96% - 100%)    Parameters below as of 02 Jul 2025 07:45  Patient On (Oxygen Delivery Method): nasal cannula, 4L      I&O's Summary    01 Jul 2025 07:01  -  02 Jul 2025 07:00  --------------------------------------------------------  IN: 200 mL / OUT: 500 mL / NET: -300 mL        PHYSICAL EXAM:  TELE: NSR with very short PAT  Constitutional: NAD, awake and alert  HEENT: Moist Mucous Membranes, Anicteric  Pulmonary: Non-labored, breath sounds are diminished bilaterally, No wheezing, rales or rhonchi  Cardiovascular: Regular, S1 and S2, +murmurs, no rubs, gallops or clicks  Gastrointestinal: Bowel Sounds present, soft, nontender.   Lymph: Trace peripheral edema. No lymphadenopathy.  Skin: No visible rashes or ulcers.  Psych:  Mood & affect appropriate  LABS: All Labs Reviewed:                        8.0    2.37  )-----------( 106      ( 02 Jul 2025 06:55 )             25.9                         8.3    2.50  )-----------( 187      ( 01 Jul 2025 06:30 )             26.1                         9.1    3.98  )-----------( 145      ( 30 Jun 2025 08:10 )             29.2     02 Jul 2025 06:55    144    |  101    |  28     ----------------------------<  102    3.7     |  37     |  0.92   01 Jul 2025 06:30    145    |  100    |  32     ----------------------------<  117    3.5     |  41     |  0.80   30 Jun 2025 08:10    145    |  101    |  32     ----------------------------<  134    3.8     |  40     |  0.97     Ca    8.3        02 Jul 2025 06:55  Ca    8.1        01 Jul 2025 06:30  Ca    8.0        30 Jun 2025 08:10  Phos  2.7       30 Jun 2025 08:10  Mg     1.7       30 Jun 2025 08:10    TPro  5.7    /  Alb  3.0    /  TBili  0.4    /  DBili  x      /  AST  15     /  ALT  34     /  AlkPhos  62     02 Jul 2025 06:55  TPro  6.1    /  Alb  3.0    /  TBili  0.4    /  DBili  x      /  AST  30     /  ALT  27     /  AlkPhos  63     01 Jul 2025 06:30  TPro  6.5    /  Alb  3.0    /  TBili  0.4    /  DBili  x      /  AST  18     /  ALT  23     /  AlkPhos  59     30 Jun 2025 08:10  12 Lead ECG:   Ventricular Rate 57 BPM    Atrial Rate 57 BPM    P-R Interval 128 ms    QRS Duration 90 ms    Q-T Interval 508 ms    QTC Calculation(Bazett) 494 ms    P Axis 64 degrees    R Axis 15 degrees    T Axis 84 degrees    Diagnosis Line Sinus bradycardia with premature atrial complexes  Prolonged QT  Abnormal ECG  When compared with ECG of 19-JUN-2025 04:43,  Vent. rate has decreased BY  31 BPM  Borderline criteria for Lateral infarct are no longer present  Non-specific change in ST segment in Inferiorleads (06-28-25 @ 09:15)    TRANSTHORACIC ECHOCARDIOGRAM REPORT  ________________________________________________________________________________                                      _______  Pt. Name:       MARTINEZ JONES Study Date:    6/12/2025  MRN:            [id number]        YOB: 1940  Accession #:    [id number]       Age:           84 years  Account#:       [id number]      Gender:        F  Heart Rate:                     Height:        62.99 in (160.00 cm)  Rhythm:                         Weight:        147.71 lb (67.00 kg)  Blood Pressure: 109/63 mmHg     BSA/BMI:       1.70 m² / 26.17 kg/m²  ________________________________________________________________________________________  Referring Physician:    [id number] Naresh Vitale  Interpreting Physician: Daniel Jorge M.D.  Primary Sonographer:    Jamal Hayes    CPT:               ECHO TTE WO CON COMP W DOPP - 54708.m  Indication(s):     Shock, unspecified - R57.9  Procedure:         Transthoracic echocardiogram with 2-D, M-mode and complete                     spectral and color flow Doppler.  Ordering Location: ICU1  Admission Status:  Inpatient  Study Information: Image quality for this study is technically difficult.    _______________________________________________________________________________________     CONCLUSIONS:      1. Technically difficult image quality.   2. Left ventricular systolic function is normal with an ejection fraction of 67 % by Arias's method of disks.   3. Mild left ventricular hypertrophy.   4.Normal right ventricular cavity size and probably normal right ventricular systolic function.   5. Tricuspid aortic valve with normal leaflet excursion. There is calcification of the aortic valve leaflets.   6. Moderate mitral valve leaflet calcification.   7. Mild mitral regurgitation.   8. Moderate tricuspid regurgitation.   9. The inferior vena cava is normal in size measuring 1.64 cm in diameter, (normal <2.1cm) with normal inspiratory collapse (normal >50%) consistent with normal right atrial pressure (~3, range 0-5mmHg).  10. Estimated pulmonary artery systolic pressure is 57 mmHg, consistent with moderate-to-severe pulmonary hypertension.  11. Trace pericardial effusion.  12. Right pleural effusion noted.  13. Compared to the transthoracic echocardiogram performed on 6/2/2025, there have been no significant interval changes.  ________________________________________________________________________________________  FINDINGS:     Left Ventricle:  Left ventricular systolic function is normal with a calculated ejection fraction of 67 % by the Arias's biplane method of disks. Mild left ventricular hypertrophy.     Right Ventricle:  The right ventricular cavity is normal in size and right ventricular systolic function is probablynormal. Tricuspid annular plane systolic excursion (TAPSE) is 1.9 cm (normal >=1.7 cm).     Left Atrium:  The left atrium is normal in size with an indexed volume of 29.06 ml/m².     Right Atrium:  The right atrium is normal in size with an indexed volume of 26.00 ml/m² and an indexed area of 9.41 cm²/m².     Interatrial Septum:  The interatrial septum appears intact.     Aortic Valve:  The aortic valve is tricuspid with normal leaflet excursion. There is calcification of the aortic valve leaflets. There is mild thickening of the aortic valve leaflets. There is trace aortic regurgitation.     Mitral Valve:  Structurally normal mitral valve with normal leaflet excursion. There is calcification of the mitral valve annulus. There is moderate leaflet calcification. There is mild mitral regurgitation.     Tricuspid Valve:  The tricuspid valve is structurally normal with normal leaflet excursion. There is moderate tricuspid regurgitation. Estimated pulmonary artery systolic pressure is 57 mmHg,consistent with moderate-to-severe pulmonary hypertension.     Pulmonic Valve:  The pulmonic valve was not well visualized. There is trace pulmonic regurgitation.     Aorta:  The aortic root at the sinuses of Valsalva is normal in size, measuring 3.30 cm (indexed 1.94 cm/m²). The ascending aorta is normal in size, measuring 3.00 cm (indexed 1.76 cm/m²). The aortic arch diameter is normal in size, measuring 2.4 cm (indexed 1.41 cm/m²).     Pericardium:  There is a trace pericardial effusion.     Pleura:  Right pleural effusion noted.     Systemic Veins:  The inferior vena cava is normal in size measuring 1.64 cm in diameter, (normal <2.1cm) with normal inspiratory collapse (normal >50%) consistent with normal right atrial pressure (~3, range 0-5mmHg).  ____________________________________________________________________  QUANTITATIVE DATA:  Left Ventricle Measurements: (Indexed to BSA)     IVSd (2D):   1.2 cm  LVPWd (2D):  1.1 cm  LVIDd (2D):  4.2 cm  LVIDs (2D):  2.4 cm  LV Mass:     169 g  99.5 g/m²  LV Vol d, MOD A2C: 38.9 ml 22.88 ml/m²  LV Vol d, MOD A4C: 40.9 ml 24.06 ml/m²  LV Vol d, MOD BP:  40.9 ml 24.04 ml/m²  LV Vol s, MOD A2C: 11.3 ml 6.65 ml/m²  LV Vol s, MOD A4C: 13.4 ml 7.88 ml/m²  LV Vol s, MOD BP:  13.4 ml 7.91 ml/m²  LVOT SV MOD BP:    27.4 ml  LV EF% MOD BP:     67 %     MV E Vmax:    1.54 m/s  MV A Vmax:    0.74 m/s  MV E/A:       2.08  e' lateral:   11.90 cm/s  e' medial:    8.81 cm/s  E/e' lateral: 12.94  E/e' medial:  17.48  E/e' Average: 14.87  MV DT:272 msec    Aorta Measurements: (Normal range) (Indexed to BSA)     Ao Root d     3.30 cm (2.7 - 3.3 cm) 1.94 cm/m²  Ao Asc d, 2D: 3.00  Ao Asc prox:  3.00 cm                1.76 cm/m²  Ao Arch:      2.4 cm    Left Atrium Measurements: (Indexed to BSA)  L Diam 2D: 3.60 cm  Right Ventricle Measurements: Right Atrial Measurements:     TAPSE:            1.9 cm      RA Vol s, MOD A4C         44.2 ml  RV Base (RVID1):  2.9 cm      RA Vol s, MOD A4C i BSA   26.00 ml/m²  RV Mid (RVID2): 2.5 cm      RA Area s, MOD A4C        16.0 cm²  RV Major (RVID3): 6.4 cm      RA Area s, MOD A4C, i BSA 9.41 cm²/m²  LVOT / RVOT/ Qp/Qs Data: (Indexed to BSA)  LVOT Diameter,s: 2.20 cm  LVOT Area:       3.80 cm²    Mitral Valve Measurements:     MV E Vmax: 1.5 m/s         MR Vmax:          3.80 m/s  MV A Vmax: 0.7 m/s         MR Peak Gradient: 57.8 mmHg  MV E/A:    2.1    Tricuspid Valve Measurements:     TR Vmax:          3.7 m/s  TR Peak Gradient: 54.5 mmHg  RA Pressure:      3 mmHg  PASP:             57 mmHg    ________________________________________________________________________________________  Electronically signed on 6/13/2025 at 11:19:51 AM by Daniel Jorge M.D.         *** Final ***

## 2025-07-02 NOTE — PROGRESS NOTE ADULT - SUBJECTIVE AND OBJECTIVE BOX
Patient is a 84y old  Female who presents with a chief complaint of septic shock, PNA (02 Jul 2025 15:31)        INTERVAL HPI/OVERNIGHT EVENTS:    MEDICATIONS  (STANDING):  albuterol/ipratropium for Nebulization 3 milliLiter(s) Nebulizer three times a day  aMIOdarone    Tablet 100 milliGRAM(s) Oral daily  chlorhexidine 2% Cloths 1 Application(s) Topical <User Schedule>  dextrose 5%. 1000 milliLiter(s) (50 mL/Hr) IV Continuous <Continuous>  dextrose 5%. 1000 milliLiter(s) (100 mL/Hr) IV Continuous <Continuous>  dextrose 50% Injectable 12.5 Gram(s) IV Push once  dextrose 50% Injectable 25 Gram(s) IV Push once  dextrose 50% Injectable 25 Gram(s) IV Push once  fluticasone propionate/ salmeterol 500-50 MICROgram(s) Diskus 1 Dose(s) Inhalation two times a day  furosemide   Injectable 40 milliGRAM(s) IV Push daily  glucagon  Injectable 1 milliGRAM(s) IntraMuscular once  hydrocortisone sodium succinate Injectable 25 milliGRAM(s) IV Push every 12 hours  insulin lispro (ADMELOG) corrective regimen sliding scale   SubCutaneous three times a day before meals  insulin lispro (ADMELOG) corrective regimen sliding scale   SubCutaneous at bedtime  melatonin 5 milliGRAM(s) Oral at bedtime  pantoprazole  Injectable 40 milliGRAM(s) IV Push daily  sodium chloride 3%  Inhalation 4 milliLiter(s) Inhalation three times a day    MEDICATIONS  (PRN):  acetaminophen     Tablet .. 650 milliGRAM(s) Oral every 6 hours PRN Mild Pain (1 - 3)  dextrose Oral Gel 15 Gram(s) Oral once PRN Blood Glucose LESS THAN 70 milliGRAM(s)/deciliter      Allergies    No Known Allergies    Intolerances          Vital Signs Last 24 Hrs  T(C): 36.9 (02 Jul 2025 11:56), Max: 36.9 (02 Jul 2025 11:56)  T(F): 98.5 (02 Jul 2025 11:56), Max: 98.5 (02 Jul 2025 11:56)  HR: 68 (02 Jul 2025 19:38) (68 - 81)  BP: 127/80 (02 Jul 2025 05:26) (127/80 - 127/80)  BP(mean): --  RR: 18 (02 Jul 2025 05:26) (18 - 18)  SpO2: 100% (02 Jul 2025 19:38) (97% - 100%)    Parameters below as of 02 Jul 2025 19:38  Patient On (Oxygen Delivery Method): nasal cannula w/ humidification, 3 L        General: WN/WD NAD  Respiratory: CTA B/L  CV: RRR, S1S2, no murmurs, rubs or gallops  Abdominal: Soft, NT, ND +BS, Last BM  Extremities: No edema, + peripheral pulses    LABS:                        8.0    2.37  )-----------( 106      ( 02 Jul 2025 06:55 )             25.9     07-02    144  |  101  |  28[H]  ----------------------------<  102[H]  3.7   |  37[H]  |  0.92    Ca    8.3[L]      02 Jul 2025 06:55    TPro  5.7[L]  /  Alb  3.0[L]  /  TBili  0.4  /  DBili  x   /  AST  15  /  ALT  34  /  AlkPhos  62  07-02    CAPILLARY BLOOD GLUCOSE      POCT Blood Glucose.: 101 mg/dL (02 Jul 2025 17:26)  POCT Blood Glucose.: 105 mg/dL (02 Jul 2025 12:28)  POCT Blood Glucose.: 112 mg/dL (02 Jul 2025 08:22)  POCT Blood Glucose.: 139 mg/dL (01 Jul 2025 21:31)    Urinalysis Basic - ( 02 Jul 2025 06:55 )    Color: x / Appearance: x / SG: x / pH: x  Gluc: 102 mg/dL / Ketone: x  / Bili: x / Urobili: x   Blood: x / Protein: x / Nitrite: x   Leuk Esterase: x / RBC: x / WBC x   Sq Epi: x / Non Sq Epi: x / Bacteria: x          RADIOLOGY & ADDITIONAL TESTS:

## 2025-07-02 NOTE — PROGRESS NOTE ADULT - ASSESSMENT
REASON FOR VISIT  .. Management of problems listed below      EVENTS/CURRENT ISSUES.  . 6/28/2025 tr to icu (had refused avaps last noc)   . 6/26/2025 No further hemoptysis while off apixaba   . 6/24/2025 tr out of icu   . 6/23/2025 tr icu  . 6/22/2025 worsening gas exchange placed on hfnc apixa stoppd   . 6/20/2025 apixa restartd and noc bpap orderde   . 6/16/2025 fob done oozing rul and l lo lobe post basal   . 6/14/2025 iv ufh dced   . 6/13/2025 continues to have mild hemoptysis but is also on iv ufh drip   . 6/13/2025 dw pt re rbaa of bronch and se agrees scheduled for 6/16 10 in or   . 6/13/2025 dw cardio and id   . 6/11/2025 qft funfitell and galactomannan orderd   . 6/11/2025 pt wa sstarted on iv ufh for hemoptysis and pleuritic chest pain but was stopped when vq showed vlp   . 6/10/2025 Mild hemoptysis   . 6/9 tr out of icu  . 6/8/2025  . PNEUMONIA RML 6/8/2025  . SHOCK 6/8/2025   . NOREPI 6/8/2025   . STRESS STEROIDS   .... HYDROCORTISONE 6/8/2025  . A fib (chronic) POA 6/8/2025  . ANEMIA Hb 6/8/2025 Hb 7.5  . KIMBERLY ON CKD Cr 6/8/2025 Cr 1.9        REVIEW OF SYMPTOMS   Able to give ROS  Yes     RELIABILITY +/-   CONSTITUTIONAL Weakness Yes    ENDOCRINE  No heat or cold intolerance    ALLERGY No hives  Sore throat No stridor  RESP Shortness of breath YES   NEURO New weakness No   CARDIAC   Palpitations No         PHYSICAL EXAM    HEENT Unremarkable  atraumatic   RESP Fair air entry  Harsh breath sound   CARDIAC S1 S2 No S3     NO JVD    ABDOMEN No hepatosplenomegaly   PEDAL EDEMA present No calf tenderness    REASON FOR VISIT  .. Management of problems listed below      CC.   . 6/8/2025 brought in by ems for right sided chest pain that started at 3am- nonradiating- patient was offered aspirin by ems- patient refused and stated to ems that she is on plavix and was advised not to take aspirin. patient on 43 litres nasl cannula-     OVERALL PRESENTATION.  . 6/8/2025 84 yr old female with PMHx afib on eliquis, COPD (not on home O2), PE/Rt lower extremity DVT 2017, Rt LE IVC filter 2017 CKD3, aplastic anemia, polymyalgia rheumatica on prednisone 5 mg po qd, requiring PRBC transfusions ~8 weeks (via Rt subclavian mediport), AVN bilat hips, HTN, HLD, HFpEF (75% 6.2.25), diastolic dysfx grade1, recent hospitalization 5/25/25-6/5/25 for ADHF/COPD exacerbation, Pt with eventual improvement and transfer to [facility] facility from where she presents to E.D. this a.m. 6/8/25 with c/o Rt sided chest pain. general malaise. Pt stated began to feel ill evening before presentation, daughter this a.m. endorsed pt lethargic, pale.     In E.D. Pt found to have fever with tmax of 101, KIMBERLY on CKD with sCr 1.90 (baseline 1.2-1.6), HYPOtnsive with SBP 80's (pt on midodrine therapy, usual 's). In addition found to have leukocytosis of 37.6 (baseline 5.25 6/5/25), procalcitonin of 13.6, Hgb 7.5, elevated INR 2.27,  Chest xray demonstrated RML consolidations, concerning for pneum. In E.D. pt receiving 500 cc's NS, Vanco 1 gm, zosyn. Pt received tylenol 1 gm IV x1.     PMH.        BEST PRACTICE ISSUES.  . HOB ELEVATN.    .... Yes  . DIET  .   .... DASH 6/8/2025   . FREE WATER.   ....   .  IV FLUID.  .... 6/15-6/20/2025 1/2 ns 50   ..... 6/8 lr 40 x 12 h   . PHARMAC DVT PPLX .    .... 6/22 apixa dced suspect hemoptysis   .... 6/20 apixa 2.5 x 2   .... 6/12-6/14/2025 iv ufh (hospitalist)  .... 6/11/2025 [facility] 5k.2   .... 6/10/2025 4:46 PM iv ufh   .... [facility] 6/9-6/10/2025   . NON PHARMAC DVT PPLX .    .... 6/23/2025 compr device   . STRESS ULCR PPLX .   .... PROTONIX 40 6/22/2025   . DATE/DM MGMT.   ..... See under Endocrine section   GENERAL DATA .   . COVID.         .... scv2 6/8/2025 scv2 (-)   . GOC.    ....    . ICU STAY.   .... 6/28/2025   .... 6/22 - 6/25   .... 6/8-6/9   . INFECTION PPLX .   .... CHLORHEXIDINE 2% 6/8/2025   . ALLGY.   ....  nka  . WT.   .... 6/8/2025 69   . BMI.  ....6/8/2025 26    [id number]  VITALS/GAS EXCHANGE/DRIPS    ABGS.   6/20/2025 4p 5l 736/71/65   6/22/2025 7p bpap 16/8/1 731/82/153   6/23/2025 11p bpap 12/5/.4 727/92/57  6/23/2025 2a avaps 450/18/8/25/10 .45 740/67/85   6/26/2025 5a 3l 742/70/67   6/28/2025 9p avaps 24/475/30/8/30/10 743/61/82  .  VS/ PO/IO/ VENT/ DRIPS.  7/2/2025 afeb 74 130/70   7/2/2025 3l 99%    [id number]   SUMMARY BASELINE .     84 yr old female pt of Dr Gallegos not on home ox or home cpap used to use trelegy lives with spouse quit 40 y 1/2 ppd with PMHx afib on eliquis, COPD (not on home O2), PE/Rt lower extremity DVT 2017, Rt LE IVC filter 2017 CKD3, aplastic anemia, polymyalgia rheumatica on prednisone 5 mg po qd, requiring PRBC transfusions around 8 weeks (via Rt subclavian mediport), AVN bilat hips, HTN, HLD, HFpEF (75% 6.2.25), diastolic dysfx grade1, recent hospitalization 5/25/25-6/5/25 for ADHF/COPD exacerbation, Pt with eventual improvement and transfer to [facility] facility   CC.   . 6/8/2025 R CHEST PAIN   MAIN ISSUES.  . COPD 6/23/2025 hydrocort 50.3 6/27/2025 pred 10   . HYPERCAPNIC RESP FAILURE: 6/20/2025 4p 5l 736/71/65 6/23/2025 2a avaps 450/18/8/25/10 .45 740/67/85   .... 6/20/2025 Noct bpap 15/5/35/16 ordrd 6/23 noct avaps .45 14 epap 8 vt 450 25/10   . RESP FAILURE 6/22/2025  HFNC 6/22 5p HFNC 80% 50l po 95% tr icu   . MILD HEMOPTYSOIS 6/10/2025 6/22 apixa stoppd   . PNEUMONIA RML 6/8/2025:  ZOSYN 6/8-6/16/2025   . PLEURITIS CHEST PAIN 6/10/2025: 6/10 vte excluded vlp vq   . SHOCK 6/8/2025: tr oo icu 6/9   . NOREPI 6/8-6/9/2025   . STRESS STEROIDS : HYDROCORTISONE 6/8-6/17/2025  . A fib (chronic) POA 6/8/20256/22/2025 6/22 apixa stopped   . CHF: tte 6/2/2025  ef 71%  pasp 54  la mod dilatd 6/22 lasix 40   . ANEMIA Hb 6/8-6/13-6/25/2025 Hb 7.5- 8.4- 8.5  . TRANSFUSION 6/8  1 u prbc  6/22 1 u prbc   . KIMBERLY ON CKD Cr 6/8-6/13/2025 Cr 1.9- 1.1  . HYPERNATREMIA 6/24-6/25/2025 Na 147-146     . PNEUMONIA: 6/22 ct bl ul opac 6/22-6/29/2025 zosyn   . SUSPECTED BLEEDING IN [place] CAUSED RESP DECOMPENSATION AND NEW OPACITIES ON 6/22 CT  6/22 622 apixa stoppd     . RESP FAILURE 6/28/2025 LIKELY DUE TO NONCOMPLIANCE WITH NOCT AVAP Tr to ICU 6/28/2025 AVAPS 30% 24   . COPD HYDROCORT 6/29/2025 h 50.3   . KIMBERLY ON CKD 6/28/2025 Cr 1.4    . HYPOPHOSPHATEMIA 6/28/2025 PO4 .7     PMH.   . AFon Eliquis,   . COPD (not on home o2),   . CKD,   . aplastic anemia,   . TRANSFUSION 6/2/2025 1 u prbc   . PMR (chronic prednisone with AVN hip),   . HLD,   . HTN,   . DM    PROCEDURES/DEVICES .  . 6/16/2025 FOB br wash rul ooze rul l lo lobe post basal     PAST PROCEDURES   . r mediport poa 6/8/2025     DISCUSSIONS.  .... Discussed with primary care and relevant consultants on an ongoing basis       TIME SPENT.  . Over 36 minutes aggregate care time spent on encounter; activities included   direct patient care, counseling and/or coordinating care reviewing notes, lab data/ imaging , discussion with multidisciplinary team/ patient  /family and explaining in detail risks, benefits, alternatives  of the recommendations     ROBERT HENRIQUEZ 84 6/8/2025 1940

## 2025-07-02 NOTE — PROGRESS NOTE ADULT - PROBLEM SELECTOR PLAN 1
cont low dose admelog corrective scale coverage qac/qhs  cont cons cho diet  bg numbers at goal in hosp setting. 0 = understands/communicates without difficulty

## 2025-07-02 NOTE — PROGRESS NOTE ADULT - ATTENDING COMMENTS
84F h/o AFib on Eliquis, COPD, CKD, aplastic anemia, polymyalgia rheumatica on chronic steroids, avascular necrosis of hips, HTN, HLD, with recent admission to [place] 5/26 - 6/5 for acute HFpEF exacerbation and COPD exacerbation, discharged to rehab on 6/5 and returning today with right sided chest pain, general malaise and feeling unwell. She reports some slight cough though otherwise no other new symptoms reported. Found to be hypotensive, febrile w/ leukocytosis. Admitted to ICU for septic shock likely 2/2 to PNA and KIMBERLY. downgraded to tele. s/p RRT 6/28 for hypothermia, hypotension and lethargy    Metabolic encephalopathy   acute on chronic hypercapnic respiratory failure    - pulm following    - on AVAPs. educated that she needs to stay on for full 8 hours.    - wean to keep pulse ox >88%     - cont advair, duoneb, hypertonic saline QID    shock secondary to adrenal insufficiency     - on stress dosed steroids. solucortef weaned, will wean to home dose in 2-3 weeks.     PNA/hemoptysis     - s/p bronch with blood in the RUL. AC held.     - weaned off droxidopa/midodrine. Weaning steroids as tolerated. will need a prolonged taper.     - completed zosyn 6/29.     - Pulm/ID following     acute on chronic diastolic heart failure: - Echo 6/12/25 as above EF 67% mild LVH, mod MAC, mild MR, mod to severe pHTN  - No evidence of any meaningful volume overload   - Can switch IV Lasix to home Torsemide as per cardio     Aplastic anemia    - eliquis held. patient had recurrent hemoptysis with evidence of bleeding on bronch. s/p prbc on 6/23.     KIMBERLY/CKD3, prerenal azotemia     - nephrology following     - potassium supplemented     afib     - cardio following.     - cont amiodarone 100mg daily     - BB and diuretics held    - discussed with cardio, patient has been on midodrine and florinef in the past for orthostatic hypotension.      PMR    - on chronic prednisone 5mg daily at home.     HLD    - cont statin     DVT proph: SCDs given hemoptysis  daughter updated oat bedside. keep  solucortef q12h for today .

## 2025-07-03 ENCOUNTER — RX RENEWAL (OUTPATIENT)
Age: 85
End: 2025-07-03

## 2025-07-03 ENCOUNTER — TRANSCRIPTION ENCOUNTER (OUTPATIENT)
Age: 85
End: 2025-07-03

## 2025-07-03 LAB
ALBUMIN SERPL ELPH-MCNC: 3.1 G/DL — LOW (ref 3.3–5)
ALP SERPL-CCNC: 74 U/L — SIGNIFICANT CHANGE UP (ref 40–120)
ALT FLD-CCNC: 42 U/L — SIGNIFICANT CHANGE UP (ref 12–78)
ANION GAP SERPL CALC-SCNC: 5 MMOL/L — SIGNIFICANT CHANGE UP (ref 5–17)
AST SERPL-CCNC: 27 U/L — SIGNIFICANT CHANGE UP (ref 15–37)
BILIRUB SERPL-MCNC: 0.4 MG/DL — SIGNIFICANT CHANGE UP (ref 0.2–1.2)
BUN SERPL-MCNC: 24 MG/DL — HIGH (ref 7–23)
CALCIUM SERPL-MCNC: 7.9 MG/DL — LOW (ref 8.5–10.1)
CHLORIDE SERPL-SCNC: 98 MMOL/L — SIGNIFICANT CHANGE UP (ref 96–108)
CO2 SERPL-SCNC: 41 MMOL/L — HIGH (ref 22–31)
CREAT SERPL-MCNC: 0.52 MG/DL — SIGNIFICANT CHANGE UP (ref 0.5–1.3)
EGFR: 93 ML/MIN/1.73M2 — SIGNIFICANT CHANGE UP
EGFR: 93 ML/MIN/1.73M2 — SIGNIFICANT CHANGE UP
GLUCOSE SERPL-MCNC: 101 MG/DL — HIGH (ref 70–99)
HCT VFR BLD CALC: 27.4 % — LOW (ref 34.5–45)
HGB BLD-MCNC: 8.5 G/DL — LOW (ref 11.5–15.5)
MCHC RBC-ENTMCNC: 29.7 PG — SIGNIFICANT CHANGE UP (ref 27–34)
MCHC RBC-ENTMCNC: 31 G/DL — LOW (ref 32–36)
MCV RBC AUTO: 95.8 FL — SIGNIFICANT CHANGE UP (ref 80–100)
NRBC # BLD AUTO: 0 K/UL — SIGNIFICANT CHANGE UP (ref 0–0)
NRBC # FLD: 0 K/UL — SIGNIFICANT CHANGE UP (ref 0–0)
NRBC BLD AUTO-RTO: 0 /100 WBCS — SIGNIFICANT CHANGE UP (ref 0–0)
PLATELET # BLD AUTO: 115 K/UL — LOW (ref 150–400)
PMV BLD: 12.1 FL — SIGNIFICANT CHANGE UP (ref 7–13)
POTASSIUM SERPL-MCNC: 3.2 MMOL/L — LOW (ref 3.5–5.3)
POTASSIUM SERPL-SCNC: 3.2 MMOL/L — LOW (ref 3.5–5.3)
PROT SERPL-MCNC: 6 G/DL — SIGNIFICANT CHANGE UP (ref 6–8.3)
RBC # BLD: 2.86 M/UL — LOW (ref 3.8–5.2)
RBC # FLD: 16.7 % — HIGH (ref 10.3–14.5)
SODIUM SERPL-SCNC: 144 MMOL/L — SIGNIFICANT CHANGE UP (ref 135–145)
WBC # BLD: 3.15 K/UL — LOW (ref 3.8–10.5)
WBC # FLD AUTO: 3.15 K/UL — LOW (ref 3.8–10.5)

## 2025-07-03 PROCEDURE — 99232 SBSQ HOSP IP/OBS MODERATE 35: CPT

## 2025-07-03 PROCEDURE — 99233 SBSQ HOSP IP/OBS HIGH 50: CPT | Mod: GC

## 2025-07-03 RX ORDER — PREDNISONE 20 MG/1
2.5 TABLET ORAL
Qty: 10 | Refills: 0
Start: 2025-07-03 | End: 2025-07-06

## 2025-07-03 RX ORDER — PREDNISONE 20 MG/1
1.5 TABLET ORAL
Qty: 10.5 | Refills: 0
Start: 2025-07-03 | End: 2025-07-09

## 2025-07-03 RX ORDER — PREDNISONE 20 MG/1
2 TABLET ORAL
Qty: 14 | Refills: 0
Start: 2025-07-03 | End: 2025-07-09

## 2025-07-03 RX ORDER — TORSEMIDE 20 MG/1
20 TABLET ORAL DAILY
Refills: 0 | Status: DISCONTINUED | OUTPATIENT
Start: 2025-07-04 | End: 2025-07-04

## 2025-07-03 RX ORDER — APIXABAN 2.5 MG/1
2.5 TABLET, FILM COATED ORAL EVERY 12 HOURS
Refills: 0 | Status: DISCONTINUED | OUTPATIENT
Start: 2025-07-03 | End: 2025-07-04

## 2025-07-03 RX ORDER — HYDROCORTISONE 20 MG
25 TABLET ORAL DAILY
Refills: 0 | Status: DISCONTINUED | OUTPATIENT
Start: 2025-07-03 | End: 2025-07-04

## 2025-07-03 RX ADMIN — APIXABAN 2.5 MILLIGRAM(S): 2.5 TABLET, FILM COATED ORAL at 21:40

## 2025-07-03 RX ADMIN — IPRATROPIUM BROMIDE AND ALBUTEROL SULFATE 3 MILLILITER(S): .5; 2.5 SOLUTION RESPIRATORY (INHALATION) at 20:46

## 2025-07-03 RX ADMIN — Medication 1 DOSE(S): at 21:40

## 2025-07-03 RX ADMIN — AMIODARONE HYDROCHLORIDE 100 MILLIGRAM(S): 50 INJECTION, SOLUTION INTRAVENOUS at 05:38

## 2025-07-03 RX ADMIN — Medication 5 MILLIGRAM(S): at 21:40

## 2025-07-03 RX ADMIN — FUROSEMIDE 40 MILLIGRAM(S): 10 INJECTION INTRAMUSCULAR; INTRAVENOUS at 05:38

## 2025-07-03 RX ADMIN — Medication 1 DOSE(S): at 05:39

## 2025-07-03 RX ADMIN — Medication 4 MILLILITER(S): at 20:44

## 2025-07-03 RX ADMIN — Medication 4 MILLILITER(S): at 14:26

## 2025-07-03 RX ADMIN — Medication 25 MILLIGRAM(S): at 05:38

## 2025-07-03 RX ADMIN — IPRATROPIUM BROMIDE AND ALBUTEROL SULFATE 3 MILLILITER(S): .5; 2.5 SOLUTION RESPIRATORY (INHALATION) at 07:54

## 2025-07-03 RX ADMIN — Medication 40 MILLIGRAM(S): at 11:58

## 2025-07-03 RX ADMIN — Medication 4 MILLILITER(S): at 07:54

## 2025-07-03 RX ADMIN — Medication 1 APPLICATION(S): at 05:44

## 2025-07-03 RX ADMIN — Medication 25 MILLIGRAM(S): at 17:43

## 2025-07-03 RX ADMIN — IPRATROPIUM BROMIDE AND ALBUTEROL SULFATE 3 MILLILITER(S): .5; 2.5 SOLUTION RESPIRATORY (INHALATION) at 14:25

## 2025-07-03 RX ADMIN — Medication 40 MILLIEQUIVALENT(S): at 13:30

## 2025-07-03 RX ADMIN — Medication 40 MILLIEQUIVALENT(S): at 11:58

## 2025-07-03 RX ADMIN — APIXABAN 2.5 MILLIGRAM(S): 2.5 TABLET, FILM COATED ORAL at 11:58

## 2025-07-03 NOTE — PROGRESS NOTE ADULT - ASSESSMENT
REASON FOR VISIT  .. Management of problems listed below      EVENTS/CURRENT ISSUES.  . 6/28/2025 tr to icu (had refused avaps last noc)   . 6/26/2025 No further hemoptysis while off apixaba   . 6/24/2025 tr out of icu   . 6/23/2025 tr icu  . 6/22/2025 worsening gas exchange placed on hfnc apixa stoppd   . 6/20/2025 apixa restartd and noc bpap orderde   . 6/16/2025 fob done oozing rul and l lo lobe post basal   . 6/14/2025 iv ufh dced   . 6/13/2025 continues to have mild hemoptysis but is also on iv ufh drip   . 6/13/2025 dw pt re rbaa of bronch and se agrees scheduled for 6/16 10 in or   . 6/13/2025 dw cardio and id   . 6/11/2025 qft funfitell and galactomannan orderd   . 6/11/2025 pt wa sstarted on iv ufh for hemoptysis and pleuritic chest pain but was stopped when vq showed vlp   . 6/10/2025 Mild hemoptysis   . 6/9 tr out of icu  . 6/8/2025  . PNEUMONIA RML 6/8/2025  . SHOCK 6/8/2025   . NOREPI 6/8/2025   . STRESS STEROIDS   .... HYDROCORTISONE 6/8/2025  . A fib (chronic) POA 6/8/2025  . ANEMIA Hb 6/8/2025 Hb 7.5  . KIMBERLY ON CKD Cr 6/8/2025 Cr 1.9        REVIEW OF SYMPTOMS   Able to give ROS  Yes     RELIABILITY +/-   CONSTITUTIONAL Weakness Yes    ENDOCRINE  No heat or cold intolerance    ALLERGY No hives  Sore throat No stridor  RESP Shortness of breath YES   NEURO New weakness No   CARDIAC   Palpitations No         PHYSICAL EXAM    HEENT Unremarkable  atraumatic   RESP Fair air entry  Harsh breath sound   CARDIAC S1 S2 No S3     NO JVD    ABDOMEN No hepatosplenomegaly   PEDAL EDEMA present No calf tenderness    REASON FOR VISIT  .. Management of problems listed below      CC.   . 6/8/2025 brought in by ems for right sided chest pain that started at 3am- nonradiating- patient was offered aspirin by ems- patient refused and stated to ems that she is on plavix and was advised not to take aspirin. patient on 43 litres nasl cannula-     OVERALL PRESENTATION.  . 6/8/2025 84 yr old female with PMHx afib on eliquis, COPD (not on home O2), PE/Rt lower extremity DVT 2017, Rt LE IVC filter 2017 CKD3, aplastic anemia, polymyalgia rheumatica on prednisone 5 mg po qd, requiring PRBC transfusions ~8 weeks (via Rt subclavian mediport), AVN bilat hips, HTN, HLD, HFpEF (75% 6.2.25), diastolic dysfx grade1, recent hospitalization 5/25/25-6/5/25 for ADHF/COPD exacerbation, Pt with eventual improvement and transfer to [facility] facility from where she presents to E.D. this a.m. 6/8/25 with c/o Rt sided chest pain. general malaise. Pt stated began to feel ill evening before presentation, daughter this a.m. endorsed pt lethargic, pale.     In E.D. Pt found to have fever with tmax of 101, KIMBERLY on CKD with sCr 1.90 (baseline 1.2-1.6), HYPOtnsive with SBP 80's (pt on midodrine therapy, usual 's). In addition found to have leukocytosis of 37.6 (baseline 5.25 6/5/25), procalcitonin of 13.6, Hgb 7.5, elevated INR 2.27,  Chest xray demonstrated RML consolidations, concerning for pneum. In E.D. pt receiving 500 cc's NS, Vanco 1 gm, zosyn. Pt received tylenol 1 gm IV x1.     PMH.        BEST PRACTICE ISSUES.  . HOB ELEVATN.    .... Yes  . DIET  .   .... DASH 6/8/2025   . FREE WATER.   ....   .  IV FLUID.  .... 6/15-6/20/2025 1/2 ns 50   ..... 6/8 lr 40 x 12 h   . PHARMAC DVT PPLX .    .... 7/3/2025 apixa 2.5 x 2 started by hospitalist   .... 6/22 apixa dced suspect hemoptysis   .... 6/20 apixa 2.5 x 2   .... 6/12-6/14/2025 iv ufh (hospitalist)  .... 6/11/2025 [facility] 5k.2   .... 6/10/2025 4:46 PM iv ufh   .... \[facility]\"" 6/9-6/10/2025   . NON PHARMAC DVT PPLX .    .... 6/23/2025 compr device   . STRESS ULCR PPLX .   .... PROTONIX 40 6/22/2025   . DATE/DM MGMT.   ..... See under Endocrine section   GENERAL DATA .   . COVID.         .... scv2 6/8/2025 scv2 (-)   . GOC.    ....    . ICU STAY.   .... 6/28/2025   .... 6/22 - 6/25   .... 6/8-6/9   . INFECTION PPLX .   .... CHLORHEXIDINE 2% 6/8/2025   . ALLGY.   ....  nka  . WT.   .... 6/8/2025 69   . BMI.  ....6/8/2025 26      [id number]  VITALS/GAS EXCHANGE/DRIPS    ABGS.   6/20/2025 4p 5l 736/71/65   6/22/2025 7p bpap 16/8/1 731/82/153   6/23/2025 11p bpap 12/5/.4 727/92/57  6/23/2025 2a avaps 450/18/8/25/10 .45 740/67/85   6/26/2025 5a 3l 742/70/67   6/28/2025 9p avaps 24/475/30/8/30/10 743/61/82  .  VS/ PO/IO/ VENT/ DRIPS.  7/3/2025 afeb 78 120/60     [id number]   SUMMARY BASELINE .     84 yr old female pt of Dr Gallegos not on home ox or home cpap used to use trelegy lives with spouse quit 40 y 1/2 ppd with PMHx afib on eliquis, COPD (not on home O2), PE/Rt lower extremity DVT 2017, Rt LE IVC filter 2017 CKD3, aplastic anemia, polymyalgia rheumatica on prednisone 5 mg po qd, requiring PRBC transfusions around 8 weeks (via Rt subclavian mediport), AVN bilat hips, HTN, HLD, HFpEF (75% 6.2.25), diastolic dysfx grade1, recent hospitalization 5/25/25-6/5/25 for ADHF/COPD exacerbation, Pt with eventual improvement and transfer to [facility] facility   CC.   . 6/8/2025 R CHEST PAIN   MAIN ISSUES.  . COPD 6/23/2025 hydrocort 50.3 6/27/2025 pred 10   . HYPERCAPNIC RESP FAILURE: 6/20/2025 4p 5l 736/71/65 6/23/2025 2a avaps 450/18/8/25/10 .45 740/67/85   .... 6/20/2025 Noct bpap 15/5/35/16 ordrd 6/23 noct avaps .45 14 epap 8 vt 450 25/10   . RESP FAILURE 6/22/2025  HFNC 6/22 5p HFNC 80% 50l po 95% tr icu   . MILD HEMOPTYSOIS 6/10/2025 6/22 apixa stoppd   . PNEUMONIA RML 6/8/2025:  ZOSYN 6/8-6/16/2025   . PLEURITIS CHEST PAIN 6/10/2025: 6/10 vte excluded vlp vq   . SHOCK 6/8/2025: tr oo icu 6/9   . NOREPI 6/8-6/9/2025   . STRESS STEROIDS : HYDROCORTISONE 6/8-6/17/2025  . A fib (chronic) POA 6/8/20256/22/2025 6/22 apixa stopped   . CHF: tte 6/2/2025  ef 71%  pasp 54  la mod dilatd 6/22 lasix 40 -> 7/1 torsemide 20  20   . ANEMIA Hb 6/8-6/13-6/25/2025 Hb 7.5- 8.4- 8.5  . TRANSFUSION 6/8  1 u prbc  6/22 1 u prbc   . KIMBERLY ON CKD Cr 6/8-6/13/2025 Cr 1.9- 1.1  . HYPERNATREMIA 6/24-6/25/2025 Na 147-146     . PNEUMONIA: 6/22 ct bl ul opac 6/22-6/29/2025 zosyn   . SUSPECTED BLEEDING IN [place] CAUSED RESP DECOMPENSATION AND NEW OPACITIES ON 6/22 CT  6/22 622 apixa stoppd     . RESP FAILURE 6/28/2025 LIKELY DUE TO NONCOMPLIANCE WITH NOCT AVAP Tr to ICU 6/28/2025 AVAPS 30% 24   . COPD HYDROCORT 6/29/2025 h 50.3 7/3 h 25  . APIXABA a 2.5 x 2 started 7/3/2025 by hospitalist   . KIMBERLY ON CKD 6/28/2025 Cr 1.4    . HYPOPHOSPHATEMIA 6/28/2025 PO4 .7     PMH.   . AFon Eliquis,   . COPD (not on home o2),   . CKD,   . aplastic anemia,   . TRANSFUSION 6/2/2025 1 u prbc   . PMR (chronic prednisone with AVN hip),   . HLD,   . HTN,   . DM    PROCEDURES/DEVICES .  . 6/16/2025 FOB br wash rul ooze rul l lo lobe post basal     PAST PROCEDURES   . r mediport poa 6/8/2025     DISCUSSIONS.  .... Discussed with primary care and relevant consultants on an ongoing basis       TIME SPENT.  . Over 36 minutes aggregate care time spent on encounter; activities included   direct patient care, counseling and/or coordinating care reviewing notes, lab data/ imaging , discussion with multidisciplinary team/ patient  /family and explaining in detail risks, benefits, alternatives  of the recommendations     ROBERT HENRIQUEZ 84 6/8/2025 1942

## 2025-07-03 NOTE — DISCHARGE NOTE NURSING/CASE MANAGEMENT/SOCIAL WORK - FINANCIAL ASSISTANCE
[facility] provides services at a reduced cost to those who are determined to be eligible through [facility]’s financial assistance program. Information regarding [facility]’s financial assistance program can be found by going to [email or web address] or by calling [phone].

## 2025-07-03 NOTE — PROGRESS NOTE ADULT - SUBJECTIVE AND OBJECTIVE BOX
CHIEF COMPLAINT/ REASON FOR VISIT  .. Patient was seen to address the  issue listed under PROBLEM LIST which is located toward bottom of this note     MARTINEZ PATEL TELN 89 D1    Allergies    No Known Allergies    Intolerances        PAST MEDICAL & SURGICAL HISTORY:  COPD (chronic obstructive pulmonary disease)      DM (diabetes mellitus)  diet-controlled      PAF (paroxysmal atrial fibrillation)  s/p ablation      Hiatal hernia      H/O aplastic anemia      History of IBS      H/O osteoporosis      HLD (hyperlipidemia)      PMR (polymyalgia rheumatica)      History of basal cell cancer      Chronic kidney disease (CKD)      Hypotension      Presence of IVC filter      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      History of immunodeficiency      Lumbar compression fracture      H/O hiatal hernia      H/O pulmonary fibrosis      H/O sinus bradycardia      History of fracture of right hip  "unoperable"      S/P hysterectomy      S/P foot surgery      S/P knee surgery      After cataract  bilateral eye cataract surgically removed      Closed right hip fracture  rigth hip ORIF july 19 2016      S/P IVC filter  nov 2016      S/P carpal tunnel release  Right 2008 & 6/27/17      H/O kyphoplasty      Port-A-Cath in place  right chest          FAMILY HISTORY:  Family history of bladder cancer (Mother)    Family history of myocardial infarction (Father)    FH: atrial fibrillation (Father)        Home Medications:  amiodarone 200 mg oral tablet: 0.5 tab(s) orally once a day (08 Jun 2025 16:43)  Bentyl 10 mg oral capsule: 1 cap(s) orally 2 times a day, As Needed (08 Jun 2025 16:43)  Eliquis 2.5 mg oral tablet: 1 tab(s) orally 2 times a day (08 Jun 2025 16:43)  fluticasone-salmeterol 500 mcg-50 mcg/inh inhalation powder: 1 puff(s) inhaled 2 times a day (08 Jun 2025 16:43)  folic acid 1 mg oral tablet: 1 tab(s) orally once a day (in the morning) (08 Jun 2025 16:43)  gabapentin 400 mg oral capsule: 1 cap(s) orally 2 times a day (08 Jun 2025 16:43)  ipratropium-albuterol 0.5 mg-2.5 mg/3 mL inhalation solution: 3 milliliter(s) inhaled every 6 hours As needed Shortness of Breath and/or Wheezing (08 Jun 2025 16:43)  IVIG monthly:  (08 Jun 2025 16:43)  leflunomide 10 mg oral tablet: 1 tab(s) orally once a day (08 Jun 2025 16:43)  midodrine 10 mg oral tablet: 1 tab(s) orally every 8 hours (08 Jun 2025 16:43)  montelukast 10 mg oral tablet: 1 tab(s) orally once a day (08 Jun 2025 16:43)  pantoprazole 40 mg oral delayed release tablet: 1 tab(s) orally once a day (before a meal) (08 Jun 2025 16:43)  predniSONE 5 mg oral tablet: 1 tab(s) orally once a day (08 Jun 2025 16:43)  Procrit 10,000 units/mL preservative-free injectable solution: injectable once a week WEDNESDAY (08 Jun 2025 16:43)  Reclast Infusion , IV x 1 yearly:  (08 Jun 2025 16:43)  simvastatin 10 mg oral tablet: 1 tab(s) orally once a day (at bedtime) (08 Jun 2025 16:43)  tiotropium 2.5 mcg/inh inhalation aerosol: 2 puff(s) inhaled once a day (08 Jun 2025 16:43)  tiZANidine: 2 tab(s) orally once a day (at bedtime) as needed for  muscle spasm (08 Jun 2025 16:43)  torsemide 20 mg oral tablet: 1 tab(s) orally once a day (in the morning) (08 Jun 2025 16:49)      MEDICATIONS  (STANDING):  albuterol/ipratropium for Nebulization 3 milliLiter(s) Nebulizer three times a day  aMIOdarone    Tablet 100 milliGRAM(s) Oral daily  chlorhexidine 2% Cloths 1 Application(s) Topical <User Schedule>  dextrose 5%. 1000 milliLiter(s) (50 mL/Hr) IV Continuous <Continuous>  dextrose 5%. 1000 milliLiter(s) (100 mL/Hr) IV Continuous <Continuous>  dextrose 50% Injectable 12.5 Gram(s) IV Push once  dextrose 50% Injectable 25 Gram(s) IV Push once  dextrose 50% Injectable 25 Gram(s) IV Push once  fluticasone propionate/ salmeterol 500-50 MICROgram(s) Diskus 1 Dose(s) Inhalation two times a day  furosemide   Injectable 40 milliGRAM(s) IV Push daily  glucagon  Injectable 1 milliGRAM(s) IntraMuscular once  hydrocortisone sodium succinate Injectable 25 milliGRAM(s) IV Push every 12 hours  insulin lispro (ADMELOG) corrective regimen sliding scale   SubCutaneous three times a day before meals  insulin lispro (ADMELOG) corrective regimen sliding scale   SubCutaneous at bedtime  melatonin 5 milliGRAM(s) Oral at bedtime  pantoprazole  Injectable 40 milliGRAM(s) IV Push daily  sodium chloride 3%  Inhalation 4 milliLiter(s) Inhalation three times a day    MEDICATIONS  (PRN):  acetaminophen     Tablet .. 650 milliGRAM(s) Oral every 6 hours PRN Mild Pain (1 - 3)  dextrose Oral Gel 15 Gram(s) Oral once PRN Blood Glucose LESS THAN 70 milliGRAM(s)/deciliter      Diet, Regular:   DASH/TLC Sodium & Cholesterol Restricted (06-23-25 @ 13:52) [Active]          Vital Signs Last 24 Hrs  T(C): 36.5 (03 Jul 2025 04:44), Max: 36.9 (02 Jul 2025 11:56)  T(F): 97.7 (03 Jul 2025 04:44), Max: 98.5 (02 Jul 2025 11:56)  HR: 82 (03 Jul 2025 05:10) (67 - 83)  BP: 144/83 (03 Jul 2025 04:44) (139/70 - 144/83)  BP(mean): --  RR: 19 (03 Jul 2025 04:44) (18 - 19)  SpO2: 100% (03 Jul 2025 05:10) (99% - 100%)    Parameters below as of 03 Jul 2025 04:44  Patient On (Oxygen Delivery Method): nasal cannula  O2 Flow (L/min): 3        07-02-25 @ 07:01  -  07-03-25 @ 07:00  --------------------------------------------------------  IN: 200 mL / OUT: 400 mL / NET: -200 mL              LABS:                        8.5    3.15  )-----------( 115      ( 03 Jul 2025 07:36 )             27.4     07-03    144  |  98  |  24[H]  ----------------------------<  101[H]  3.2[L]   |  41[H]  |  0.52    Ca    7.9[L]      03 Jul 2025 07:36    TPro  6.0  /  Alb  3.1[L]  /  TBili  0.4  /  DBili  x   /  AST  27  /  ALT  42  /  AlkPhos  74  07-03      Urinalysis Basic - ( 03 Jul 2025 07:36 )    Color: x / Appearance: x / SG: x / pH: x  Gluc: 101 mg/dL / Ketone: x  / Bili: x / Urobili: x   Blood: x / Protein: x / Nitrite: x   Leuk Esterase: x / RBC: x / WBC x   Sq Epi: x / Non Sq Epi: x / Bacteria: x            WBC:  WBC Count: 3.15 K/uL (07-03 @ 07:36)  WBC Count: 2.37 K/uL (07-02 @ 06:55)  WBC Count: 2.50 K/uL (07-01 @ 06:30)  WBC Count: 3.98 K/uL (06-30 @ 08:10)      MICROBIOLOGY:  RECENT CULTURES:  06-28 Urine XXXX XXXX   <10,000 CFU/mL Normal Urogenital Ginger                    Sodium:  Sodium: 144 mmol/L (07-03 @ 07:36)  Sodium: 144 mmol/L (07-02 @ 06:55)  Sodium: 145 mmol/L (07-01 @ 06:30)  Sodium: 145 mmol/L (06-30 @ 08:10)      0.52 mg/dL 07-03 @ 07:36  0.92 mg/dL 07-02 @ 06:55  0.80 mg/dL 07-01 @ 06:30  0.97 mg/dL 06-30 @ 08:10      Hemoglobin:  Hemoglobin: 8.5 g/dL (07-03 @ 07:36)  Hemoglobin: 8.0 g/dL (07-02 @ 06:55)  Hemoglobin: 8.3 g/dL (07-01 @ 06:30)  Hemoglobin: 9.1 g/dL (06-30 @ 08:10)      Platelets: Platelet Count - Automated: 115 K/uL (07-03 @ 07:36)  Platelet Count - Automated: 106 K/uL (07-02 @ 06:55)  Platelet Count - Automated: 187 K/uL (07-01 @ 06:30)  Platelet Count - Automated: 145 K/uL (06-30 @ 08:10)      LIVER FUNCTIONS - ( 03 Jul 2025 07:36 )  Alb: 3.1 g/dL / Pro: 6.0 g/dL / ALK PHOS: 74 U/L / ALT: 42 U/L / AST: 27 U/L / GGT: x             Urinalysis Basic - ( 03 Jul 2025 07:36 )    Color: x / Appearance: x / SG: x / pH: x  Gluc: 101 mg/dL / Ketone: x  / Bili: x / Urobili: x   Blood: x / Protein: x / Nitrite: x   Leuk Esterase: x / RBC: x / WBC x   Sq Epi: x / Non Sq Epi: x / Bacteria: x        RADIOLOGY & ADDITIONAL STUDIES:      MICROBIOLOGY:  RECENT CULTURES:  06-28 Urine XXXX XXXX   <10,000 CFU/mL Normal Urogenital Ginger

## 2025-07-03 NOTE — CAREGIVER ENGAGEMENT NOTE - CAREGIVER OUTREACH NOTES - FREE TEXT
pt dc to [facility] friday 7/4, matty arranged for 11 am with [facility] [phone].

## 2025-07-03 NOTE — PROGRESS NOTE ADULT - ASSESSMENT
84F h/o AFib on Eliquis, COPD, CKD, aplastic anemia, polymyalgia rheumatica on chronic steroids, avascular necrosis of hips, HTN, HLD, with recent admission to [place] 5/26 - 6/5 for acute HFpEF exacerbation and COPD exacerbation, discharged to rehab on 6/5 and returning today with right sided chest pain, general malaise and feeling unwell. She reports some slight cough though otherwise no other new symptoms reported. Found to be hypotensive, febrile w/ leukocytosis. Admitted to ICU for septic shock likely 2/2 to PNA and KIMBERLY. downgraded to tele. s/p RRT 6/28 for hypothermia, hypotension and lethargy. Patient is being weaned off stress doses of steroids after being treated for adrenal insufficiency.

## 2025-07-03 NOTE — PROGRESS NOTE ADULT - PROBLEM SELECTOR PROBLEM 7
Type 2 diabetes mellitus
Aplastic anemia
COPD (chronic obstructive pulmonary disease)
Type 2 diabetes mellitus
Aplastic anemia
Aplastic anemia
COPD (chronic obstructive pulmonary disease)
COPD (chronic obstructive pulmonary disease)
Type 2 diabetes mellitus
Type 2 diabetes mellitus
COPD (chronic obstructive pulmonary disease)
Type 2 diabetes mellitus
COPD (chronic obstructive pulmonary disease)
Type 2 diabetes mellitus

## 2025-07-03 NOTE — PROGRESS NOTE ADULT - PROBLEM SELECTOR PROBLEM 9
HLD (hyperlipidemia)
Atrial fibrillation
Polymyalgia rheumatica
Atrial fibrillation
Atrial fibrillation
HLD (hyperlipidemia)
Polymyalgia rheumatica
Atrial fibrillation
Atrial fibrillation
Polymyalgia rheumatica
Atrial fibrillation
HLD (hyperlipidemia)

## 2025-07-03 NOTE — PROGRESS NOTE ADULT - PROBLEM SELECTOR PLAN 3
PNA/hemoptysis   - s/p bronch with blood in the RUL on 6/13. AC held since 6/13  - Weaned off droxidopa/midodrine from hypotension  - Weaning steroids as tolerated; patient currently on hydrocortisone 25mg IV q12hr  - Completed zosyn 6/29  - no recurrence, will resume AC

## 2025-07-03 NOTE — PROGRESS NOTE ADULT - SUBJECTIVE AND OBJECTIVE BOX
Patient is a 84y old  Female who presents with a chief complaint of septic shock, PNA (03 Jul 2025 12:01)      Subjective:  INTERVAL HPI/OVERNIGHT EVENTS: Patient seen and examined at bedside.  Patient is feeling better     MEDICATIONS  (STANDING):  albuterol/ipratropium for Nebulization 3 milliLiter(s) Nebulizer three times a day  aMIOdarone    Tablet 100 milliGRAM(s) Oral daily  apixaban 2.5 milliGRAM(s) Oral every 12 hours  chlorhexidine 2% Cloths 1 Application(s) Topical <User Schedule>  dextrose 5%. 1000 milliLiter(s) (50 mL/Hr) IV Continuous <Continuous>  dextrose 5%. 1000 milliLiter(s) (100 mL/Hr) IV Continuous <Continuous>  dextrose 50% Injectable 12.5 Gram(s) IV Push once  dextrose 50% Injectable 25 Gram(s) IV Push once  dextrose 50% Injectable 25 Gram(s) IV Push once  fluticasone propionate/ salmeterol 500-50 MICROgram(s) Diskus 1 Dose(s) Inhalation two times a day  glucagon  Injectable 1 milliGRAM(s) IntraMuscular once  hydrocortisone sodium succinate Injectable 25 milliGRAM(s) IV Push every 12 hours  insulin lispro (ADMELOG) corrective regimen sliding scale   SubCutaneous three times a day before meals  insulin lispro (ADMELOG) corrective regimen sliding scale   SubCutaneous at bedtime  melatonin 5 milliGRAM(s) Oral at bedtime  pantoprazole  Injectable 40 milliGRAM(s) IV Push daily  sodium chloride 3%  Inhalation 4 milliLiter(s) Inhalation three times a day    MEDICATIONS  (PRN):  acetaminophen     Tablet .. 650 milliGRAM(s) Oral every 6 hours PRN Mild Pain (1 - 3)  dextrose Oral Gel 15 Gram(s) Oral once PRN Blood Glucose LESS THAN 70 milliGRAM(s)/deciliter      Allergies    No Known Allergies    Intolerances        REVIEW OF SYSTEMS:  CONSTITUTIONAL: No fever or chills  HEENT:  No headache, no sore throat  RESPIRATORY: No cough or shortness of breath  CARDIOVASCULAR: No chest pain or palpitations  GASTROINTESTINAL: No abd pain, nausea, vomiting, or diarrhea      Objective:  Vital Signs Last 24 Hrs  T(C): 36.8 (03 Jul 2025 11:30), Max: 36.9 (02 Jul 2025 20:26)  T(F): 98.2 (03 Jul 2025 11:30), Max: 98.4 (02 Jul 2025 20:26)  HR: 75 (03 Jul 2025 14:25) (67 - 83)  BP: 129/67 (03 Jul 2025 11:30) (129/67 - 144/83)  BP(mean): --  RR: 20 (03 Jul 2025 11:30) (18 - 20)  SpO2: 99% (03 Jul 2025 14:25) (96% - 100%)    Parameters below as of 03 Jul 2025 14:25  Patient On (Oxygen Delivery Method): nasal cannula        GENERAL: NAD, lying in bed comfortably  HEAD:  Normocephalic  EYES:  conjunctiva and sclera clear  ENT: Moist mucous membranes  NECK: Supple  CHEST/LUNG: Clear to auscultation bilaterally; No rales or rhonchi; no wheezing. Unlabored respirations  HEART: Regular rate and rhythm; S1S2+  ABDOMEN: Bowel sounds present; Soft, Nontender, Nondistended.   EXTREMITIES:  + distal Peripheral Pulses;  No cyanosis, or edema  NERVOUS SYSTEM:  Alert & Oriented X3;  No gross focal deficits   MSK: moves all extremities  SKIN: No rashes     LABS:                        8.5    3.15  )-----------( 115      ( 03 Jul 2025 07:36 )             27.4     03 Jul 2025 07:36    144    |  98     |  24     ----------------------------<  101    3.2     |  41     |  0.52     Ca    7.9        03 Jul 2025 07:36    TPro  6.0    /  Alb  3.1    /  TBili  0.4    /  DBili  x      /  AST  27     /  ALT  42     /  AlkPhos  74     03 Jul 2025 07:36      Urinalysis Basic - ( 03 Jul 2025 07:36 )    Color: x / Appearance: x / SG: x / pH: x  Gluc: 101 mg/dL / Ketone: x  / Bili: x / Urobili: x   Blood: x / Protein: x / Nitrite: x   Leuk Esterase: x / RBC: x / WBC x   Sq Epi: x / Non Sq Epi: x / Bacteria: x      CAPILLARY BLOOD GLUCOSE      POCT Blood Glucose.: 103 mg/dL (03 Jul 2025 16:54)  POCT Blood Glucose.: 122 mg/dL (03 Jul 2025 12:23)  POCT Blood Glucose.: 125 mg/dL (03 Jul 2025 08:15)  POCT Blood Glucose.: 136 mg/dL (02 Jul 2025 21:39)  POCT Blood Glucose.: 101 mg/dL (02 Jul 2025 17:26)        Urinalysis with Rflx Culture (collected 06-28-25 @ 16:20)    Culture - Urine (collected 06-28-25 @ 16:20)  Source: Urine  Final Report (06-30-25 @ 06:49):    <10,000 CFU/mL Normal Urogenital Ginger        RADIOLOGY & ADDITIONAL TESTS:    Personally reviewed.     Consultant(s) Notes Reviewed:  [x] YES  [ ] NO    Plan of care discussed with patient ; all questions answered

## 2025-07-03 NOTE — DISCHARGE NOTE NURSING/CASE MANAGEMENT/SOCIAL WORK - FLU SEASON?
Digital Medicine: Clinician Follow-Up    Called patient to address high BP alert. Patient says he feels fine and has no complaints. Patient says he things the BP machine is malfunctioning. He says he has been losing weight and knows that BP decreases with weight loss. Patient says he is currently immobile and has a procedure tomorrow. He says he intends to go to the [facility] post- procedure to have his BP machine validated     The history is provided by the patient.     Follow Up  Follow-up reason(s): reading review      Alert received.   Care Team received high BP alert.  Patient is not experiencing symptoms.      INTERVENTION(S)  recommended diet modifications and encouragement/support    PLAN  patient verbalizes understanding    30-day BP average exceeds goal of <130<80 mmHg. Continue current BP medications  Advised patient to monitor BP closely. Reviewed s/s of hypertensive emergency and when to seek medication attention. Patient has the number for [facility].   Encouraged patient to bring BP machine and cuff to the [facility] when he is able to get it validated.       There are no preventive care reminders to display for this patient.    Last 5 Patient Entered Readings                                      Current 30 Day Average: 160/79     Recent Readings 1/25/2020 1/17/2020 1/12/2020 1/7/2020 1/7/2020    SBP (mmHg) 182 109 167 173 195    DBP (mmHg) 69 65 86 86 106    Pulse 61 81 81 52 53        Hypertension Medications     furosemide (LASIX) 20 MG tablet TAKE 1 TABLET BY MOUTH ONCE DAILY AS NEEDED LEG EDEMA    irbesartan (AVAPRO) 150 MG tablet TAKE 1 TABLET BY MOUTH  DAILY    metoprolol succinate (TOPROL-XL) 25 MG 24 hr tablet TAKE 1 TABLET BY MOUTH DAILY                 Screenings  
No

## 2025-07-03 NOTE — DISCHARGE NOTE NURSING/CASE MANAGEMENT/SOCIAL WORK - NSDCVIVACCINE_GEN_ALL_CORE_FT
Tdap; 23-Aug-2021 22:35; Danii Dubon (RN); [employer]; [id number] (Exp. Date: 01-Oct-2022); IntraMuscular; Deltoid Left.; 0.5 milliLiter(s); VIS (VIS Published: 09-May-2013, VIS Presented: 23-Aug-2021);

## 2025-07-03 NOTE — DISCHARGE NOTE NURSING/CASE MANAGEMENT/SOCIAL WORK - NSDCPEFALRISK_GEN_ALL_CORE
For information on Fall & Injury Prevention, visit: [email or web address] OR  [email or web address] OR  https://www.cdc.gov/steadi/patient.html

## 2025-07-03 NOTE — PROGRESS NOTE ADULT - PROBLEM SELECTOR PROBLEM 8
Atrial fibrillation
COPD (chronic obstructive pulmonary disease)
COPD (chronic obstructive pulmonary disease)
Atrial fibrillation
Polymyalgia rheumatica
Atrial fibrillation
COPD (chronic obstructive pulmonary disease)
Polymyalgia rheumatica
COPD (chronic obstructive pulmonary disease)
Polymyalgia rheumatica
COPD (chronic obstructive pulmonary disease)
COPD (chronic obstructive pulmonary disease)

## 2025-07-03 NOTE — DISCHARGE NOTE NURSING/CASE MANAGEMENT/SOCIAL WORK - PATIENT PORTAL LINK FT
You can access the FollowMyHealth Patient Portal offered by [facility] by registering at the following website: [email or web address]. By joining [employer]’s FollowMyHealth portal, you will also be able to view your health information using other applications (apps) compatible with our system.

## 2025-07-03 NOTE — PROGRESS NOTE ADULT - PROBLEM SELECTOR PLAN 1
CARDIOLOGY OFFICE NOTE        ORIGINAL REASON FOR CONSULT:  Abnormal Lung CT (02/15/2022)  Ana Wyatt MD   [address] [facility] [id number]  Ezra Castro is a 73 year old male with the following issues: CARDIAC INVESTIGATIONS/IMAGING   Age 73  HTN (new diagnosis 01/21/25)  Dyslipidemia  Nonobstructive CAD (05/02/2022)  Infrarenal AAA (5.5 x5.2 cm) s/p EVAR 11/27/24.   PAD (s/p thrombolysis of left popliteal artery after acute limb ischemia 3/23/2024, s/p stent ( 6 mm x 150 mm biomemics stent) and DCB angioplasty to left distal SFA/popliteal artery with IR 03/242024)    Moderate COPD (Dr. Moncada)  Cee's esophagus  Current smoker (1.5 ppd)    NT proBNP: None  10 year ASCVD risk: 21.5%  [id number] Score: 2   H2FpEF Score: 2 (incomplete)  NYHA FC: 2  Plaque Years: 2,627 (LDL= 37 - Date= 4/30/2025)         Echo ((3/25/2024): LVEF 68% E/e' 12.1 RVSP 40 mmHg Tomasa 16.7 ml/m²   NM Exercise Stress Test (05/13/2022): normal, exercised 8 min 1 sec, MPHR 88%  ECG (10/20/2020): SR, cannot rule out inferior infarct  CT Cardiac Calcium (05/02/2022): Total Ca 1928, , LAD 1442, , RCA 79  THROMBOLYSIS 3/23/2024: of the left popliteal artery as a result of of acute limb ischemia (IR) s/p 6 mm stent and DCB angioplasty to left distal SFA/popliteal artery (IR)  DARLYN (04/16/25): right 0.94, left 0.82 (7/01/2024) R 0/93, L 0.94. (4/23/2024) R 0.95, L 1.06 (04/19/23): R 1.02, L .99 (04/28/2022): R 1.02, L 1.07 (05/25/2022): R 1.05, L 1.04  CT Abdomen (01/08/25): Postoperative changes of endovascular AAA repair with the infrarenal AAA measuring approximately 5.5 x 5.2 cm, unchanged.  No evidence of endoleak. No graft occlusion, stenosis, or kinking.Bilateral common iliac artery aneurysms are unchanged.   CT Angio Abd Aorta (11/04/24):  Infrarenal abdominal aortic aneurysm measuring up to 5.5 x 5.2 cm, SEBASTIAN 2.3cm LICA 2.9 cm (11/15/2023): Infrarenal abdominal aortic aneurysm measuring up to 4.8 x 4.7 
Suspect acute on chronic diastolic heart failure exacerbation with acute on chronic COPD exacerbation aspect. D-dimer negative, CXR without evidence of airspace consolidation, likely element of interstitial edema, BNP elevated  LE doppler negative for DVT  Recent TTE (4/8/2025): EF 63%, LV hypertrophy, intermediate diastolic dysfunction, mild pHTN. Discussed with Cardio- no need for repeat while inpatient    CT chest w/o IV contrast- mild B/L pleural effusions- also noted T8 vertebral body fx- patient aware of fracture- states occurred last year  Strict I/Os, daily weights  Fluid restriction 2L, avoid IVF   PT evaluation- recommend no skilled PT needs  Continuous pulse ox, wean O2 as tolerated to obtain SpO2 88-92% goal.  Patient diuresed well- will switch from IV lasix to home torsemide 20mg daily  For potential COPD exacerbation continue duonebs q6h prn, increase home prednisone 5mg qd to 50mg qd x 5 days. pulmonology (Dr. Baxter) consulted
cm; SEBASTIAN aneurysm 2 cm; LICA aneurysm 2.6 cm. Bilateral popliteal artery aneurysms, measuring 1.8 cm on the right and up to 1.1 cm on the left.  US Carotid Duplex Bilateral (04/28/2022): no stenosis  US LE Arteries Duplex Bilateral (04/16/25); Patent femoral-popliteal segment including existing left femoropopliteal stent  (03/23/2024): widely patent left popliteal artery stent with no evidence of recurrent stenosis, and mild left trifurcation disease with a short segmental occlusion of the distal anterior tibial artery.   EGD/Colonoscopy (09/03/24): irregular Z line at 36cm, mild gastric erythema, mild duodenal bulb erythema (03/04/2021): Irregular Z line at 36 cm. Mild gastric erythema. Mild duodenitis. No polyps, medium internal hemorrhoids.  PFT (01/26/2022): moderate obstructive ventilatory impairment     Mr. Castro presents in follow up after 07/02/24 accompanied by ***   At our last visit, we recommended CTA abdominal aorta with leg run off,  continue current medication regimen and remain active as tolerated.     Since that time, patient saw Dr. Hussein in Pulmonary on 08/05/24 CT lung cancer screening and with PFT's ordered for next visit. He underwent EGD on 09/03/24 with Dr. Lucio. Patient met with Dr. Burkett in Neurology on 10/15/24 about feet numbness, EMG of LLE and NCS of both legs were ordered. He underwent EMG on 10/30/24. Patient had annual physical with PCP on 11/06/24. We received the results of his CTA abdominal aorta showing growth to 5.5cmx 5.2cm on 11/11/24 and recommended EVAR with Dr Hwang. He met with Dr. Hwang on 11/22/24 who agreed with EVAR procedure as was patient. Patient underwent EVAR with us and Dr. Hwang on 11/27/24. He had post op follow up with Emily Ollerman,CNP on 01/14/25, he was hypertensive at 152/70 and Ivanna recommended patient monitor BP for a week and send us readings before we would start anti-hypertensive's. Patient contacted us with elevated BP readings on 
25 and we started him on losartan 50 mg with BMP labs in a week. He followed up with IR on 25 and repeat imaging was ordered.     Today,***  Patient at this time denies history of orthopnea, paroxysmal nocturnal dyspnea, bendopnea, palpitations, lightheadedness/dizziness, presyncope/syncope, edema and any anginal type chest pain.  For physical activity please see below.     CARDIAC MEDICATION CHANGES     2022: ASA no longer needed. Increased Crestor from 5 mg to 20 mg daily.   2024 DC: Aspirin restarted and plavix initiated.   2025: losartan 50 mg started    ACTIVITY LEVEL     Decreased activity since penitentiary.  Climbs stairs easily.   Yardwork 1x week, mows grass, shovels snow, feeds birds.   Watches softball/baseball tournaments on weekends.     REVIEW OF SYSTEMS     Negative other than what has been specified in the history.     RECENT HOSPITALIZATION/S     3/23-3/26/2024 at [place]: for left leg pain secondary to acute limb ischemia from occlusion of the left popliteal artery. Underwent thrombolysis with IR. Repeat angiogram on 3/24 noted near complete resolution of acute thrombus, and s/p 6 mm PCI and DCB angioplasty to left distal SFA/popliteal artery. Discharged on aspirin and plavix.     PERSONAL/SOCIAL HISTORY     Lives with his wife (Felicia) in [place].  Retired from factory work.   Current smoker (1 ppd).  Regular alcohol consumption.    FAMILY HISTORY     2 living children.  1  from cancer.  1  sister.  Mother and Father with CAD in 60's-70's/ father had 4x bypass  No family history of SCD (sudden cardiac death) or premature CAD    SURGICAL HISTORY     Past Surgical History:   Procedure Laterality Date    Endovasular aaa repair (evar)  2024    Esophagogastroduodenoscopy (egd)  2024    Dr. Gerardo Cee's Esophagus and Gastritis 3 year recall    Umbilical hernia repair       SANAZ RISK ASSESSMENT     NA    PAD/AAA RISK ASSESSMENT 
(ULTRASOUND/ABIs)     CT Angio Abd Aorta (11/16/22): Infrarenal abdominal aortic aneurysm measuring up to 4.8 x 4.7 cm    THROMBOLYSIS 3/23/2024: of the left popliteal artery as a result of of acute limb ischemia (IR)    LE ANGIOGRAM (3/24/2024): s/p 6 mm stent and DCB angioplasty to left distal SFA/popliteal artery (IR)    DARLYN (7/01/2024) R 0/93, L 0.94. (4/23/2024) R 0.95, L 1.06 (04/19/23): R 1.02, L .99 (04/28/2022): R 1.02, L 1.07 (05/25/2022): R 1.05, L 1.04    US LE Arteries Duplex Bilateral (03/23/2024): widely patent left popliteal artery stent with no evidence of recurrent stenosis, and mild left trifurcation disease with a short segmental occlusion of the distal anterior tibial artery.     CURRENT MEDICATIONS     Current Outpatient Medications   Medication Sig Dispense Refill    rosuvastatin (CRESTOR) 20 MG tablet Take 1 tablet by mouth daily. 90 tablet 3    albuterol 108 (90 Base) MCG/ACT inhaler Inhale 2 puffs into the lungs every 4 hours as needed for Shortness of Breath or Wheezing. 8.5 g 1    umeclidinium-vilanterol (Anoro Ellipta) 62.5-25 MCG/ACT inhaler Inhale 1 puff into the lungs daily. 60 each 5    aspirin 81 MG chewable tablet Chew 1 tablet by mouth daily. 60 tablet 0    azelastine (ASTELIN) 0.1 % nasal spray Spray 1 spray in each nostril in the morning and 1 spray in the evening. Use in each nostril as directed. 30 mL 2    losartan (COZAAR) 50 MG tablet Take 1 tablet by mouth daily. 90 tablet 1    clopidogrel (PLAVIX) 75 MG tablet Take 1 tablet by mouth daily. 90 tablet 3    pantoprazole (PROTONIX) 40 MG tablet Take 1 tablet by mouth daily. 90 tablet 3    albuterol (PROAIR HFA) 108 (90 BASE) MCG/ACT inhaler 2 puffs X 4/ day.  Wait 1-2 minutes between each puff.  Use with spacer. 1 Inhaler 0     No current facility-administered medications for this visit.      PHYSICAL EXAMINATION         1/14/2025     2:48 PM 1/14/2025     3:29 PM 4/17/2025     9:33 AM 5/2/2025     9:02 AM 5/2/2025     9:12 AM 
Suspect acute on chronic COPD exacerbation aspect with acute on chronic diastolic heart failure exacerbation (since resolved). D-dimer negative, CXR without evidence of airspace consolidation, likely element of interstitial edema, BNP elevated  LE doppler negative for DVT  Recent TTE (4/8/2025): EF 63%, LV hypertrophy, intermediate diastolic dysfunction, mild pHTN. Discussed with Cardio- no need for repeat while inpatient    CT chest w/o IV contrast- mild B/L pleural effusions- also noted T8 vertebral body fx- patient aware of fracture- states occurred last year  Strict I/Os, daily weights  Fluid restriction 2L, avoid IVF   PT evaluation- recommend no skilled PT needs  Continuous pulse ox, wean O2 as tolerated to obtain SpO2 88-92% goal.  Patient diuresed well- switched from IV lasix to home torsemide 20mg daily  For COPD exacerbation continue duonebs q6h prn, will switch from PO prednisone to IV solumedrol for now. pulmonology (Dr. Baxter) consulted, appreciate recs
  [facility] Extended Vitals - Weight in Kg/Lb   /95 152/70 151/90 152/82 138/80   Pulse   75 71    Resp   18 18    Temp    97.9 °F (36.6 °C)    Weight kg    61.916 kg    Weight lb    136 lb 8 oz    Height    5' 3\"    Height cm    160 cm    BMI    24.18    Pulse Ox   97 % 98 %    Patient Position   Sitting     BP Location   RUE - Right upper extremity       General : No acute distress  HEENT: PERRL, Normocephalic/atraumatic, clear oropharynx  Neck: Supple, FROM. Trachea central. No JVD (jugular vein distention). + left carotid bruit.  CVS: S1, S2 normal. No R/G. 2/6 DARCI  Pulm: Clear to auscultation/percussion. No Wheeze/Rhonchi/Rales  Ext: No cyanosis/clubbing/edema  Skin: No rash/palpable nodules    LABORATORY     Recent Labs   Lab 04/30/25  0910 01/31/25  1245 11/29/24  0326 11/28/24  0457 11/27/24  1549 11/22/24  1417 11/06/24  1025   HGB 14.5  --   --  13.0 13.5 17.3* 17.4*     --   --  144 150 164 177   Sodium 138 138 138 135 139 137 139   Potassium 4.8 4.1 4.3 4.9 4.6 4.8 4.4   BUN 11 15 18 17 14 10 11   Creatinine 1.33* 1.11 1.29* 1.25* 1.01 1.08 1.07   Glomerular Filtration Rate 57* 71 59* 62 80 73 74   Hemoglobin A1C 6.0*  --   --   --   --   --  5.7*   Cholesterol 118  --   --   --   --  133  --    HDL 57  --   --   --   --  72  --    Cholesterol/ HDL Ratio 2.1  --   --   --   --  1.8  --    CALCLDL 37  --   --   --   --  42  --    Triglycerides 119  --   --   --   --  95  --    INR  --   --   --   --   --  1.1  --         ECHOCARDIOGRAM 03/10/2022     Normal left ventricular chamber size, wall thickness and systolic function with no regional wall motion abnormalities. LV EF 64 %.  Grade II diastolic dysfunction of the left ventricle (pseudonormal filling pattern).  Normal right ventricular systolic pressure; 31 mmHg.    UPCOMING APPOINTMENTS     Future Appointments   Date Time Provider Department Center   7/8/2025  8:45 AM [facility]   8/25/2025  9:15 AM [facility] CT 
[id number]   9/8/2025  8:45 AM PWA PFT [place] [address] [facility]   9/8/2025 10:00 AM Ayush Hussein MD [facility]   10/17/2025  9:00 AM WAM PVL 2 WAM[facility]L W[facility]   10/17/2025 10:00 AM WAM PVL 2 WAMPVL WAM   10/31/2025  7:30 AM EDG ACL LAB ACLED[facility] [place]   11/7/2025  8:30 AM Ana Wyatt MD [facility]     ASSESSMENT      Ezra Castro is a 73 year old male  with NYHA functional class 2 dyspnea= ischemic assessment in the form of a stress test has been negative  Patient does have coronary artery disease in the form of abundant plaque in the coronaries  Workup for peripheral arterial disease had been negative although the patient does have an infrarenal AAA which needs to be monitored  Developed acute limb ischemia that has now been treated with intervention  Patient's main symptoms of shortness of breath stem largely from COPD  Stable from cardiovascular standpoint    RECOMMENDATIONS     Will obtain CTA Abdominal Aorta with Leg Run off in 11/2024 as this is 1 year since last study. Patient will be called with results and any further recommendations.  Continue current medication regimen and risk factor modification.     No follow-ups on file.        
Probably secondary to acute on chronic COPD exacerbation aspect with acute on chronic diastolic heart failure exacerbation (since resolved). D-dimer negative, CXR without evidence of airspace consolidation, likely element of interstitial edema, BNP elevated  LE doppler negative for DVT  Recent TTE (4/8/2025): EF 63%, LV hypertrophy, intermediate diastolic dysfunction, mild pHTN. Discussed with Cardio- no need for repeat while inpatient    CT chest w/o IV contrast- mild B/L pleural effusions- also noted T8 vertebral body fx- patient aware of fracture- states occurred last year  Strict I/Os, daily weights  Fluid restriction 2L, avoid IVF   PT evaluation- recommend no skilled PT needs  Continuous pulse ox, wean O2 as tolerated to obtain SpO2 88-92% goal.  Patient diuresed well- switched from IV lasix to home torsemide 20mg daily  For COPD exacerbation continue duonebs q6h prn, switched  from PO prednisone to IV solumedrol for now. pulmonology (Dr. Baxter) consulted, appreciate recs
Suspect acute on chronic diastolic heart failure exacerbation +/- COPD exacerbation aspect. D-dimer negative, CXR without evidence of airspace consolidation, likely element of interstitial edema, BNP elevated  LE doppler negative for DVT  Recent TTE (4/8/2025): EF 63%, LV hypertrophy, intermediate diastolic dysfunction, mild pHTN, will discuss with Cardio if need for repeat while inpatient    CT chest w/o IV contrast- mild B/L pleural effusions- also noted T8 vertebral body fx- patient aware of fracture- states occurred last year  Strict I/Os, daily weights  Fluid restriction 2L, avoid IVF   PT evaluation  Continuous pulse ox, wean O2 as tolerated to obtain SpO2 88-92% goal.  Lasix 40mg IV BID per cardiology recs  For potential COPD exacerbation continue duonebs q6h prn, increase home prednisone 5mg qd to 50mg qd x 5 days. pulmonology (Dr. Baxter) consulted

## 2025-07-03 NOTE — PROGRESS NOTE ADULT - PROBLEM SELECTOR PROBLEM 6
Acute kidney injury superimposed on CKD
Type 2 diabetes mellitus
Type 2 diabetes mellitus
Acute kidney injury superimposed on CKD
Atrial fibrillation
Acute kidney injury superimposed on CKD
Atrial fibrillation
Type 2 diabetes mellitus
Type 2 diabetes mellitus
Acute kidney injury superimposed on CKD
Atrial fibrillation
Type 2 diabetes mellitus
Acute kidney injury superimposed on CKD

## 2025-07-03 NOTE — PROGRESS NOTE ADULT - SUBJECTIVE AND OBJECTIVE BOX
[facility] -- Sai Valle, Girish Jackson Savella, , Rene Hernandez  Office # [phone]    Follow Up: Resp failure, hypotension, Afib     Subjective/Observations: Denies SOB, SWAIN or orthopnea.  Denies hemoptysis.  Denies CP or palpitations    REVIEW OF SYSTEMS: All other review of systems is negative unless indicated above  PAST MEDICAL & SURGICAL HISTORY:  COPD (chronic obstructive pulmonary disease)      DM (diabetes mellitus)  diet-controlled      PAF (paroxysmal atrial fibrillation)  s/p ablation      Hiatal hernia      H/O aplastic anemia      History of IBS      H/O osteoporosis      HLD (hyperlipidemia)      PMR (polymyalgia rheumatica)      History of basal cell cancer      Chronic kidney disease (CKD)      Hypotension      Presence of IVC filter      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      History of immunodeficiency      Lumbar compression fracture      H/O hiatal hernia      H/O pulmonary fibrosis      H/O sinus bradycardia      History of fracture of right hip  "unoperable"      S/P hysterectomy      S/P foot surgery      S/P knee surgery      After cataract  bilateral eye cataract surgically removed      Closed right hip fracture  rigth hip ORIF july 19 2016      S/P IVC filter  nov 2016      S/P carpal tunnel release  Right 2008 & 6/27/17      H/O kyphoplasty      Port-A-Cath in place  right chest        MEDICATIONS  (STANDING):  albuterol/ipratropium for Nebulization 3 milliLiter(s) Nebulizer three times a day  aMIOdarone    Tablet 100 milliGRAM(s) Oral daily  apixaban 2.5 milliGRAM(s) Oral every 12 hours  chlorhexidine 2% Cloths 1 Application(s) Topical <User Schedule>  dextrose 5%. 1000 milliLiter(s) (50 mL/Hr) IV Continuous <Continuous>  dextrose 5%. 1000 milliLiter(s) (100 mL/Hr) IV Continuous <Continuous>  dextrose 50% Injectable 12.5 Gram(s) IV Push once  dextrose 50% Injectable 25 Gram(s) IV Push once  dextrose 50% Injectable 25 Gram(s) IV Push once  fluticasone propionate/ salmeterol 500-50 MICROgram(s) Diskus 1 Dose(s) Inhalation two times a day  glucagon  Injectable 1 milliGRAM(s) IntraMuscular once  hydrocortisone sodium succinate Injectable 25 milliGRAM(s) IV Push every 12 hours  insulin lispro (ADMELOG) corrective regimen sliding scale   SubCutaneous three times a day before meals  insulin lispro (ADMELOG) corrective regimen sliding scale   SubCutaneous at bedtime  melatonin 5 milliGRAM(s) Oral at bedtime  pantoprazole  Injectable 40 milliGRAM(s) IV Push daily  potassium chloride    Tablet ER 40 milliEquivalent(s) Oral every 4 hours  sodium chloride 3%  Inhalation 4 milliLiter(s) Inhalation three times a day    MEDICATIONS  (PRN):  acetaminophen     Tablet .. 650 milliGRAM(s) Oral every 6 hours PRN Mild Pain (1 - 3)  dextrose Oral Gel 15 Gram(s) Oral once PRN Blood Glucose LESS THAN 70 milliGRAM(s)/deciliter    Allergies    No Known Allergies    Intolerances      Vital Signs Last 24 Hrs  T(C): 36.8 (03 Jul 2025 11:30), Max: 36.9 (02 Jul 2025 20:26)  T(F): 98.2 (03 Jul 2025 11:30), Max: 98.4 (02 Jul 2025 20:26)  HR: 78 (03 Jul 2025 11:30) (67 - 83)  BP: 129/67 (03 Jul 2025 11:30) (129/67 - 144/83)  BP(mean): --  RR: 20 (03 Jul 2025 11:30) (18 - 20)  SpO2: 96% (03 Jul 2025 11:30) (96% - 100%)    Parameters below as of 03 Jul 2025 11:30  Patient On (Oxygen Delivery Method): nasal cannula  O2 Flow (L/min): 3    I&O's Summary    02 Jul 2025 07:01  -  03 Jul 2025 07:00  --------------------------------------------------------  IN: 200 mL / OUT: 400 mL / NET: -200 mL    PHYSICAL EXAM:  TELE: NSR with very short PAT  Constitutional: NAD, awake and alert  HEENT: Moist Mucous Membranes, Anicteric  Pulmonary: Non-labored, breath sounds are diminished bilaterally, No wheezing, rales or rhonchi  Cardiovascular: Regular, S1 and S2, +murmurs, no rubs, gallops or clicks  Gastrointestinal: Bowel Sounds present, soft, nontender.   Lymph: Trace peripheral edema. No lymphadenopathy.  Skin: No visible rashes or ulcers.  Psych:  Mood & affect appropriate     LABS: All Labs Reviewed:                        8.5    3.15  )-----------( 115      ( 03 Jul 2025 07:36 )             27.4                         8.0    2.37  )-----------( 106      ( 02 Jul 2025 06:55 )             25.9                         8.3    2.50  )-----------( 187      ( 01 Jul 2025 06:30 )             26.1     03 Jul 2025 07:36    144    |  98     |  24     ----------------------------<  101    3.2     |  41     |  0.52   02 Jul 2025 06:55    144    |  101    |  28     ----------------------------<  102    3.7     |  37     |  0.92   01 Jul 2025 06:30    145    |  100    |  32     ----------------------------<  117    3.5     |  41     |  0.80     Ca    7.9        03 Jul 2025 07:36  Ca    8.3        02 Jul 2025 06:55  Ca    8.1        01 Jul 2025 06:30    TPro  6.0    /  Alb  3.1    /  TBili  0.4    /  DBili  x      /  AST  27     /  ALT  42     /  AlkPhos  74     03 Jul 2025 07:36  TPro  5.7    /  Alb  3.0    /  TBili  0.4    /  DBili  x      /  AST  15     /  ALT  34     /  AlkPhos  62     02 Jul 2025 06:55  TPro  6.1    /  Alb  3.0    /  TBili  0.4    /  DBili  x      /  AST  30     /  ALT  27     /  AlkPhos  63     01 Jul 2025 06:30          12 Lead ECG:   Ventricular Rate 57 BPM    Atrial Rate 57 BPM    P-R Interval 128 ms    QRS Duration 90 ms    Q-T Interval 508 ms    QTC Calculation(Bazett) 494 ms    P Axis 64 degrees    R Axis 15 degrees    T Axis 84 degrees    Diagnosis Line Sinus bradycardia with premature atrial complexes  Prolonged QT  Abnormal ECG  When compared with ECG of 19-JUN-2025 04:43,  Vent. rate has decreased BY  31 BPM  Borderline criteria for Lateral infarct are no longer present  Non-specific change in ST segment in Inferiorleads (06-28-25 @ 09:15)      TRANSTHORACIC ECHOCARDIOGRAM REPORT  ________________________________________________________________________________                                      _______       Pt. Name:       MARTINEZ JONES Study Date:    6/12/2025  MRN:            [id number]        YOB: 1940  Accession #:    [id number]       Age:           84 years  Account#:       [id number]      Gender:        F  Heart Rate:                     Height:        62.99 in (160.00 cm)  Rhythm:                         Weight:        147.71 lb (67.00 kg)  Blood Pressure: 109/63 mmHg     BSA/BMI:       1.70 m² / 26.17 kg/m²  ________________________________________________________________________________________  Referring Physician:    [id number] Naresh Vitale  Interpreting Physician: Daniel Jorge M.D.  Primary Sonographer:    Jamal Hayes    CPT:               ECHO TTE WO CON COMP W DOPP - 06801.m  Indication(s):     Shock, unspecified - R57.9  Procedure:         Transthoracic echocardiogram with 2-D, M-mode and complete                     spectral and color flow Doppler.  Ordering Location: ICU1  Admission Status:  Inpatient  Study Information: Image quality for this study is technically difficult.    _______________________________________________________________________________________     CONCLUSIONS:      1. Technically difficult image quality.   2. Left ventricular systolic function is normal with an ejection fraction of 67 % by Arias's method of disks.   3. Mild left ventricular hypertrophy.   4.Normal right ventricular cavity size and probably normal right ventricular systolic function.   5. Tricuspid aortic valve with normal leaflet excursion. There is calcification of the aortic valve leaflets.   6. Moderate mitral valve leaflet calcification.   7. Mild mitral regurgitation.   8. Moderate tricuspid regurgitation.   9. The inferior vena cava is normal in size measuring 1.64 cm in diameter, (normal <2.1cm) with normal inspiratory collapse (normal >50%) consistent with normal right atrial pressure (~3, range 0-5mmHg).  10. Estimated pulmonary artery systolic pressure is 57 mmHg, consistent with moderate-to-severe pulmonary hypertension.  11. Trace pericardial effusion.  12. Right pleural effusion noted.  13. Compared to the transthoracic echocardiogram performed on 6/2/2025, there have been no significant interval changes.    ________________________________________________________________________________________  FINDINGS:     Left Ventricle:  Left ventricular systolic function is normal with a calculated ejection fraction of 67 % by the Arias's biplane method of disks. Mild left ventricular hypertrophy.     Right Ventricle:  The right ventricular cavity is normal in size and right ventricular systolic function is probablynormal. Tricuspid annular plane systolic excursion (TAPSE) is 1.9 cm (normal >=1.7 cm).     Left Atrium:  The left atrium is normal in size with an indexed volume of 29.06 ml/m².     Right Atrium:  The right atrium is normal in size with an indexed volume of 26.00 ml/m² and an indexed area of 9.41 cm²/m².     Interatrial Septum:  The interatrial septum appears intact.     Aortic Valve:  The aortic valve is tricuspid with normal leaflet excursion. There is calcification of the aortic valve leaflets. There is mild thickening of the aortic valve leaflets. There is trace aortic regurgitation.     Mitral Valve:  Structurally normal mitral valve with normal leaflet excursion. There is calcification of the mitral valve annulus. There is moderate leaflet calcification. There is mild mitral regurgitation.     Tricuspid Valve:  The tricuspid valve is structurally normal with normal leaflet excursion. There is moderate tricuspid regurgitation. Estimated pulmonary artery systolic pressure is 57 mmHg,consistent with moderate-to-severe pulmonary hypertension.     Pulmonic Valve:  The pulmonic valve was not well visualized. There is trace pulmonic regurgitation.     Aorta:  The aortic root at the sinuses of Valsalva is normal in size, measuring 3.30 cm (indexed 1.94 cm/m²). The ascending aorta is normal in size, measuring 3.00 cm (indexed 1.76 cm/m²). The aortic arch diameter is normal in size, measuring 2.4 cm (indexed 1.41 cm/m²).     Pericardium:  There is a trace pericardial effusion.     Pleura:  Right pleural effusion noted.     Systemic Veins:  The inferior vena cava is normal in size measuring 1.64 cm in diameter, (normal <2.1cm) with normal inspiratory collapse (normal >50%) consistent with normal right atrial pressure (~3, range 0-5mmHg).  ____________________________________________________________________  QUANTITATIVE DATA:  Left Ventricle Measurements: (Indexed to BSA)     IVSd (2D):   1.2 cm  LVPWd (2D):  1.1 cm  LVIDd (2D):  4.2 cm  LVIDs (2D):  2.4 cm  LV Mass:     169 g  99.5 g/m²  LV Vol d, MOD A2C: 38.9 ml 22.88 ml/m²  LV Vol d, MOD A4C: 40.9 ml 24.06 ml/m²  LV Vol d, MOD BP:  40.9 ml 24.04 ml/m²  LV Vol s, MOD A2C: 11.3 ml 6.65 ml/m²  LV Vol s, MOD A4C: 13.4 ml 7.88 ml/m²  LV Vol s, MOD BP:  13.4 ml 7.91 ml/m²  LVOT SV MOD BP:    27.4 ml  LV EF% MOD BP:     67 %     MV E Vmax:    1.54 m/s  MV A Vmax:    0.74 m/s  MV E/A:       2.08  e' lateral:   11.90 cm/s  e' medial:    8.81 cm/s  E/e' lateral: 12.94  E/e' medial:  17.48  E/e' Average: 14.87  MV DT:272 msec    Aorta Measurements: (Normal range) (Indexed to BSA)     Ao Root d     3.30 cm (2.7 - 3.3 cm) 1.94 cm/m²  Ao Asc d, 2D: 3.00  Ao Asc prox:  3.00 cm                1.76 cm/m²  Ao Arch:      2.4 cm            Left Atrium Measurements: (Indexed to BSA)  LA Diam 2D: 3.60 cm         Right Ventricle Measurements: Right Atrial Measurements:     TAPSE:            1.9 cm      RA Vol s, MOD A4C         44.2 ml  RV Base (RVID1):  2.9 cm      RA Vol s, MOD A4C i BSA   26.00 ml/m²  RV Mid (RVID2): 2.5 cm      RA Area s, MOD A4C        16.0 cm²  RV Major (RVID3): 6.4 cm      RA Area s, MOD A4C, i BSA 9.41 cm²/m²    LVOT / RVOT/ Qp/Qs Data: (Indexed to BSA)  LVOT Diameter,s: 2.20 cm  LVOT Area:       3.80 cm²    Mitral Valve Measurements:     MV E Vmax: 1.5 m/s         MR Vmax:          3.80 m/s  MV A Vmax: 0.7 m/s         MR Peak Gradient: 57.8 mmHg  MV E/A:    2.1    Tricuspid Valve Measurements:     TR Vmax:          3.7 m/s  TR Peak Gradient: 54.5 mmHg  RA Pressure:      3 mmHg  PASP:             57 mmHg    ________________________________________________________________________________________  Electronically signed on 6/13/2025 at 11:19:51 AM by Daniel Jorge M.D.         *** Final ***

## 2025-07-03 NOTE — PROGRESS NOTE ADULT - PROBLEM SELECTOR PLAN 2
Shock was secondary to adrenal insufficiency. Patient on chronic prednisone for PMR  - On stress dosed steroids. Weaning steroids as tolerated over the course of 2-3 weeks  - Today, patient is on hydrocortisone 25mg IV q12hr, will d/c with prednisone taper 12.5mg ->10mg -> 7.5mg->5mg  over 2-3 weeks   - Endocrinology following (Dr. Perlman)

## 2025-07-03 NOTE — PROGRESS NOTE ADULT - PROBLEM SELECTOR PROBLEM 10
Polymyalgia rheumatica
HLD (hyperlipidemia)
Polymyalgia rheumatica
Type 2 diabetes mellitus
HLD (hyperlipidemia)
HLD (hyperlipidemia)
Polymyalgia rheumatica
Polymyalgia rheumatica
Type 2 diabetes mellitus
Type 2 diabetes mellitus
Polymyalgia rheumatica
HLD (hyperlipidemia)
HLD (hyperlipidemia)

## 2025-07-03 NOTE — PROGRESS NOTE ADULT - PROBLEM SELECTOR PROBLEM 4
Hypotension
Hypotension
COPD (chronic obstructive pulmonary disease)
Elevated INR
COPD (chronic obstructive pulmonary disease)
Hypotension
COPD (chronic obstructive pulmonary disease)
Hypotension
Elevated INR
Elevated INR
Hypotension
Elevated INR
Hypotension
Hypotension
Elevated INR

## 2025-07-03 NOTE — PROGRESS NOTE ADULT - ASSESSMENT
84 female PMH of A-fib on Eliquis, COPD, CKD, aplastic anemia, polymyalgia rheumatica, on chronic steroids, avascular necrosis of hips, HLD, HTN, DM, recent admission to [place] 5/26 through 6/5/2025 for CHF/fluid overload/COPD exacerbation, presents to the ED form [facility] for evaluation of fever and generalized feeling of being unwell, found to be septic and hypotensive.     Sepsis, Hypotension, AHRF, PAfib  - CT chest 6/10 with multifocal R sided PNA  - hx copd on home o2 , baseline o2 keep > 88%.  Downtitrate as needed.  On 4L, reduced to 3L  - S/p bronch, 6/16.  Showed blood oozing from RUL but no lesion.  No further hemoptysis  - Pulm folowing  - Incentive spirometer    - 6/22 had AMS from CO2 narcosis.   - s/p BIPAP.  Compliance with cpap recommended  - Tolerating NC at 3L/min    - No anginal complaints  - EKG x 2 showed NSR with PAC, non-ischemic.    - Troponin negative    - Known PAfib, on AC  - Back on home Eliquis 2.5 mg q12H.  Monitor for further hemptysis  - NSR with no significant events on tele, now off  - Continue Amiodarone    - Echo 6/12/25 as above EF 67% mild LVH, mod MAC, mild MR, mod to severe pHTN  - No evidence of any meaningful volume overload   - Can switch IV Lasix to home Torsemide     - Bp remains stable    - Now off Midodrine and Northera  - Known hx of dysautonomia  - BP remains stable and controlled     - Monitor and replete lytes, keep K>4, Mg>2.  - Will continue to follow.    Shaneka Rogers DNP, NP-C, AGACNP-C  Cardiology   Call TEAMS

## 2025-07-03 NOTE — PROGRESS NOTE ADULT - TIME BILLING
time spent reviewing the hospital notes, laboratory values, imaging findings, assessing/counseling the patient, discussing with consultant physicians, social work, nursing staff   This time spent excludes time spent teaching and/or separately reported services.   ---
Plan d/w patient and primary team   We will sign off for now.   Please call us with any concerns or questions.     aBrron Carter MD   Division of Infectious Diseases  [facility]   Cell [phone] between 8am and 6pm   After 6pm and weekends please call ID service at [phone].
time spent reviewing the hospital notes, laboratory values, imaging findings, assessing/counseling the patient, discussing with consultant physicians, social work, nursing staff   This time spent excludes time spent teaching and/or separately reported services.   ---
time spent reviewing the hospital notes, laboratory values, imaging findings, assessing/counseling the patient, discussing with consultant physicians, social work, nursing staff  This time spent excludes time spent teaching and/or separately reported services.   ---
Please call us with any concerns or questions.   We will continue to follow.   Further recommendations to follow based on clinical  progression as well as availability of lab data    Barron Carter MD   Division of Infectious Diseases  [facility]   Cell [phone] between 8am and 6pm   After 6pm and weekends please call ID service at [phone].
direct patient care including but not limited to reviewing chart, medications ,laboratory data, imaging reports, discussion of plan of care with consultants on the case, coordination of care with multidisciplinary team involved in the case and discussion of plan with patient.  Patient and family agreeable to plan of care and verbalized understanding the anticipated hospital course and treatment plan.
I have spent 55 minutes of time on the encounter which excludes teaching and separately reported services.
at risk for decompensation  f/u sputum culture  f/u CT chest  spoke w/ ID and cardio today  hold eliquis x1 more day  pt likely needs another pRBC in next 24-48hrs, will monitor closely
I have spent 55 minutes of time on the encounter which excludes teaching and separately reported services.
pt seen and examine today see above plan - 84 f   hx  afib on Eliquis   hold sec to anemia  / hemoptysis  septic shock likely 2/2 to  multifocal PNA post bronch  and KIMBERLY     , hx copd [ use home o2 prn ]    -s/p  finished iv abx Zosyn 10 days  hold on full ac  r   hemoptysis resolved   but still low hb 7.8  may be sec to hx aplastic anemia  this time no acute blood loss noticed  pt is transfusion dependent-  s/p 1 unit prbc   hb 9.2   - resume full ac Eliquis  2.5 mg po bid   observe 48 hr  if remain stable dc plan kaye   - daughter aware tt/plan    .
extensive chart review  at risk for abrupt decompensation  counseling and education provided  majority of encounter was spent providing counseling to the pt
time spent reviewing the hospital notes, laboratory values, imaging findings, assessing/counseling the patient, discussing with consultant physicians, social work, nursing staff   This time spent excludes time spent teaching and/or separately reported services.   ---
Reviewing chart notes and data, reviewing telemetry monitor records, face to face time counseling the patient, communicating with Dr. Sahil thompson - possible bronch, coordinating care with SW/CM at [facility].   Pt seen and examined. Case discussed with resident. Agree with assessment and plan above (edited by me in detail)
pt seen and examine today see above plan - 84 f   hx  afib on Eliquis   hold sec to anemia  / hemoptysis  septic shock likely 2/2 to  multifocal PNA post bronch  and KIMBERLY     , hx copd [ use home o2 prn ]    -s/p  finished iv abx Zosyn 10 days  hold on full ac   hemoptysis resolved   but still low hb 7.8  may be sec to hx aplastic anemia  this time no acute blood loss noticed  pt is transfusion dependent-  s/p 1 unit prbc   hb 9.2   - resume full ac Eliquis  2.5 mg po bid   observed     so far  no hemoptysis     pt s/p acute hypercarbic  hypoxic respiratory failure / s/p bipap - resolved on o2 via nc  pulm dr holman aware       pt went to resp distress possible recurrent upper  lung bleed as per dr holman xray chest worsening bl opacity  , repeat ct chest  today   abg could not obtain by resp thep   - dc Eliquis   observe off it  hypoxia  o2 increase to  high flow o2 50 lit nc  Lasix 40 mg iv daily   , will repeat hb  1pm if still low will give 1 unit prbc today     . daughter on phn aware tt/plan .
Reviewing chart notes and data, reviewing telemetry monitor records, face to face time counseling the patient, communicating with Dr. Parker heme - pRBC ordered, coordinating care with SW/CM at [facility].   Pt seen and examined. Case discussed with resident. Agree with assessment and plan above (edited by me in detail)
Reviewing chart notes and data, reviewing telemetry monitor records, face to face time counseling the patient, coordinating care with SW/CM at [facility].   Pt seen and examined. Case discussed with resident. Agree with assessment and plan above (edited by me in detail)
pt seen and examine today see above plan - 84 f   hx  afib on Eliquis   hold sec to anemia  / hemoptysis  septic shock likely 2/2 to  multifocal PNA post bronch  and KIMBERLY     , hx copd [ use home o2 prn ]    - finished iv abx Zosyn 10 days  hold on full ac  if hb remain ok will resume as hemoptysis resolving  .
pt seen and examine today see above plan - 84 f   hx  afib on Eliquis   hold sec to anemia  / hemoptysis  septic shock likely 2/2 to  multifocal PNA post bronch  and KIMBERLY     , hx copd [ use home o2 prn ]    -s/p  finished iv abx Zosyn 10 days  hold on full ac  48 hr   hemoptysis resolved   but still low hb 7.8  may be sec to hx aplastic anemia  this time no acute blood loss noticed  pt is transfusion dependent- will 1 unit prbc    .
Reviewing chart notes and data, reviewing telemetry monitor records, face to face time counseling the patient, coordinating care with SW/CM at [facility].   Pt seen and examined. Case discussed with resident. Agree with assessment and plan above (edited by me in detail)
pt seen and examine today see above plan - 84 f   hx  afib on Eliquis   hold sec to anemia  / hemoptysis  septic shock likely 2/2 to  multifocal PNA post bronch  and KIMBERLY     , hx copd [ use home o2 prn ]    -s/p  finished iv abx Zosyn 10 days  hold on full ac   hemoptysis resolved   but still low hb 7.8  may be sec to hx aplastic anemia  this time no acute blood loss noticed  pt is transfusion dependent-  s/p 1 unit prbc   hb 9.2   - resume full ac Eliquis  2.5 mg po bid   observed     so far  no hemoptysis     pt s/p acute hypercarbic  hypoxic respiratory failure / s/p bipap - resolved on o2 via nc  pulm dr holman aware    if remain stable dc plan kaye Lawson
time spent reviewing the hospital notes, laboratory values, imaging findings, assessing/counseling the patient, discussing with consultant physicians, social work, nursing staff   This time spent excludes time spent teaching and/or separately reported services.   ---
Reviewing chart notes and data, reviewing telemetry monitor records, face to face time counseling the patient, coordinating care with SW/CM at [facility].   Pt seen and examined. Case discussed with resident. Agree with assessment and plan above (edited by me in detail)
Reviewing chart notes and data, reviewing telemetry monitor records, face to face time counseling the patient, coordinating care with SW/CM at [facility].   Pt seen and examined. Case discussed with resident. Agree with assessment and plan above (edited by me in detail)
Reviewing chart notes and data, reviewing telemetry monitor records, face to face time counseling the patient, coordinating care with SW/CM at [facility].   Pt seen and examined. Case discussed with resident. Agree with assessment and plan above (edited by me in detail)

## 2025-07-03 NOTE — PROGRESS NOTE ADULT - PROBLEM SELECTOR PROBLEM 11
Need for prophylactic measure
HLD (hyperlipidemia)
HLD (hyperlipidemia)
Need for prophylactic measure
HLD (hyperlipidemia)
HLD (hyperlipidemia)
Need for prophylactic measure
HLD (hyperlipidemia)
Need for prophylactic measure
Need for prophylactic measure
HLD (hyperlipidemia)
Need for prophylactic measure

## 2025-07-03 NOTE — PROGRESS NOTE ADULT - PROBLEM SELECTOR PROBLEM 5
Elevated INR
Acute kidney injury superimposed on CKD
Acute kidney injury superimposed on CKD
Elevated INR
Acute kidney injury superimposed on CKD
Acute kidney injury superimposed on CKD
Elevated INR
Acute kidney injury superimposed on CKD
Acute kidney injury superimposed on CKD
Elevated INR
Elevated INR
Acute kidney injury superimposed on CKD
Elevated INR
Elevated INR
Acute kidney injury superimposed on CKD
Elevated INR
Elevated INR

## 2025-07-03 NOTE — PATIENT CHOICE NOTE. - NSPTCHOICESTATE_GEN_ALL_CORE

## 2025-07-03 NOTE — SOCIAL WORK PROGRESS NOTE - NSSWPROGRESSNOTE_GEN_ALL_CORE
pt for dc tmrw Friday 7/4. sw spoke with Lacey at [facility], confirmed available bed, requested an am matty. matty has been arranged thru [facility] for 11 am  [phone]. spoke with pts stanley Grover ([phone]) aware of plans for transition tmrw to [facility] at 11 am and in agreement with plans.

## 2025-07-04 VITALS — OXYGEN SATURATION: 95 %

## 2025-07-04 LAB
ALBUMIN SERPL ELPH-MCNC: 2.9 G/DL — LOW (ref 3.3–5)
ALP SERPL-CCNC: 58 U/L — SIGNIFICANT CHANGE UP (ref 40–120)
ALT FLD-CCNC: 38 U/L — SIGNIFICANT CHANGE UP (ref 12–78)
ANION GAP SERPL CALC-SCNC: 0 MMOL/L — LOW (ref 5–17)
AST SERPL-CCNC: 17 U/L — SIGNIFICANT CHANGE UP (ref 15–37)
BASOPHILS # BLD AUTO: 0 K/UL — SIGNIFICANT CHANGE UP (ref 0–0.2)
BASOPHILS # BLD MANUAL: 0 K/UL — SIGNIFICANT CHANGE UP (ref 0–0.2)
BASOPHILS NFR BLD AUTO: 0 % — SIGNIFICANT CHANGE UP (ref 0–2)
BASOPHILS NFR BLD MANUAL: 0 % — SIGNIFICANT CHANGE UP (ref 0–2)
BILIRUB SERPL-MCNC: 0.4 MG/DL — SIGNIFICANT CHANGE UP (ref 0.2–1.2)
BUN SERPL-MCNC: 20 MG/DL — SIGNIFICANT CHANGE UP (ref 7–23)
CALCIUM SERPL-MCNC: 8 MG/DL — LOW (ref 8.5–10.1)
CHLORIDE SERPL-SCNC: 101 MMOL/L — SIGNIFICANT CHANGE UP (ref 96–108)
CO2 SERPL-SCNC: 41 MMOL/L — HIGH (ref 22–31)
CREAT SERPL-MCNC: 0.73 MG/DL — SIGNIFICANT CHANGE UP (ref 0.5–1.3)
EGFR: 82 ML/MIN/1.73M2 — SIGNIFICANT CHANGE UP
EGFR: 82 ML/MIN/1.73M2 — SIGNIFICANT CHANGE UP
EOSINOPHIL # BLD AUTO: 0 K/UL — SIGNIFICANT CHANGE UP (ref 0–0.5)
EOSINOPHIL # BLD MANUAL: 0 K/UL — SIGNIFICANT CHANGE UP (ref 0–0.5)
EOSINOPHIL NFR BLD AUTO: 0 % — SIGNIFICANT CHANGE UP (ref 0–6)
EOSINOPHIL NFR BLD MANUAL: 0 % — SIGNIFICANT CHANGE UP (ref 0–6)
GLUCOSE SERPL-MCNC: 83 MG/DL — SIGNIFICANT CHANGE UP (ref 70–99)
HCT VFR BLD CALC: 25 % — LOW (ref 34.5–45)
HGB BLD-MCNC: 7.8 G/DL — LOW (ref 11.5–15.5)
IMM GRANULOCYTES # BLD AUTO: 0.02 K/UL — SIGNIFICANT CHANGE UP (ref 0–0.07)
IMM GRANULOCYTES NFR BLD AUTO: 0.6 % — SIGNIFICANT CHANGE UP (ref 0–0.9)
IMMATURE PLATELET FRACTION #: 14.2 K/UL — HIGH (ref 4.7–11.1)
IMMATURE PLATELET FRACTION %: 8.7 % — HIGH (ref 1.6–4.9)
LG PLATELETS BLD QL AUTO: SLIGHT — SIGNIFICANT CHANGE UP
LYMPHOCYTES # BLD AUTO: 0.89 K/UL — LOW (ref 1–3.3)
LYMPHOCYTES # BLD MANUAL: 0.92 K/UL — LOW (ref 1–3.3)
LYMPHOCYTES NFR BLD AUTO: 24.5 % — SIGNIFICANT CHANGE UP (ref 13–44)
LYMPHOCYTES NFR BLD MANUAL: 25 % — SIGNIFICANT CHANGE UP (ref 13–44)
MAGNESIUM SERPL-MCNC: 1.4 MG/DL — LOW (ref 1.6–2.6)
MANUAL NEUTROPHIL BANDS #: 0.11 K/UL — SIGNIFICANT CHANGE UP (ref 0–0.84)
MANUAL SMEAR VERIFICATION: SIGNIFICANT CHANGE UP
MCHC RBC-ENTMCNC: 29.8 PG — SIGNIFICANT CHANGE UP (ref 27–34)
MCHC RBC-ENTMCNC: 31.2 G/DL — LOW (ref 32–36)
MCV RBC AUTO: 95.4 FL — SIGNIFICANT CHANGE UP (ref 80–100)
MONOCYTES # BLD AUTO: 0.71 K/UL — SIGNIFICANT CHANGE UP (ref 0–0.9)
MONOCYTES # BLD MANUAL: 0.59 K/UL — SIGNIFICANT CHANGE UP (ref 0–0.9)
MONOCYTES NFR BLD AUTO: 19.6 % — HIGH (ref 2–14)
MONOCYTES NFR BLD MANUAL: 16 % — HIGH (ref 2–14)
NEUTROPHILS # BLD AUTO: 2.01 K/UL — SIGNIFICANT CHANGE UP (ref 1.8–7.4)
NEUTROPHILS # BLD MANUAL: 2.07 K/UL — SIGNIFICANT CHANGE UP (ref 1.8–7.4)
NEUTROPHILS NFR BLD AUTO: 55.3 % — SIGNIFICANT CHANGE UP (ref 43–77)
NEUTROPHILS NFR BLD MANUAL: 56 % — SIGNIFICANT CHANGE UP (ref 43–77)
NEUTS BAND # BLD: 3 % — SIGNIFICANT CHANGE UP (ref 0–8)
NEUTS BAND NFR BLD: 3 % — SIGNIFICANT CHANGE UP (ref 0–8)
NRBC # BLD AUTO: 0 K/UL — SIGNIFICANT CHANGE UP (ref 0–0)
NRBC # FLD: 0 K/UL — SIGNIFICANT CHANGE UP (ref 0–0)
NT-PROBNP SERPL-SCNC: 3035 PG/ML — HIGH (ref 0–450)
PHOSPHATE SERPL-MCNC: 2.1 MG/DL — LOW (ref 2.5–4.5)
PLAT MORPH BLD: ABNORMAL
PLATELET # BLD AUTO: SIGNIFICANT CHANGE UP K/UL (ref 150–400)
PLATELET CLUMP BLD QL SMEAR: ABNORMAL
PMV BLD: 12.1 FL — SIGNIFICANT CHANGE UP (ref 7–13)
POTASSIUM SERPL-MCNC: 4.5 MMOL/L — SIGNIFICANT CHANGE UP (ref 3.5–5.3)
POTASSIUM SERPL-SCNC: 4.5 MMOL/L — SIGNIFICANT CHANGE UP (ref 3.5–5.3)
PROT SERPL-MCNC: 5.4 G/DL — LOW (ref 6–8.3)
RBC # BLD: 2.62 M/UL — LOW (ref 3.8–5.2)
RBC # FLD: 16.7 % — HIGH (ref 10.3–14.5)
RBC BLD AUTO: SIGNIFICANT CHANGE UP
SODIUM SERPL-SCNC: 142 MMOL/L — SIGNIFICANT CHANGE UP (ref 135–145)
WBC # BLD: 3.69 K/UL — LOW (ref 3.8–10.5)
WBC # FLD AUTO: 3.69 K/UL — LOW (ref 3.8–10.5)

## 2025-07-04 PROCEDURE — 87798 DETECT AGENT NOS DNA AMP: CPT

## 2025-07-04 PROCEDURE — 85730 THROMBOPLASTIN TIME PARTIAL: CPT

## 2025-07-04 PROCEDURE — 84100 ASSAY OF PHOSPHORUS: CPT

## 2025-07-04 PROCEDURE — 97110 THERAPEUTIC EXERCISES: CPT

## 2025-07-04 PROCEDURE — 87070 CULTURE OTHR SPECIMN AEROBIC: CPT

## 2025-07-04 PROCEDURE — 93306 TTE W/DOPPLER COMPLETE: CPT

## 2025-07-04 PROCEDURE — 88112 CYTOPATH CELL ENHANCE TECH: CPT

## 2025-07-04 PROCEDURE — 86850 RBC ANTIBODY SCREEN: CPT

## 2025-07-04 PROCEDURE — 36415 COLL VENOUS BLD VENIPUNCTURE: CPT

## 2025-07-04 PROCEDURE — 87641 MR-STAPH DNA AMP PROBE: CPT

## 2025-07-04 PROCEDURE — 87449 NOS EACH ORGANISM AG IA: CPT

## 2025-07-04 PROCEDURE — 82553 CREATINE MB FRACTION: CPT

## 2025-07-04 PROCEDURE — 99233 SBSQ HOSP IP/OBS HIGH 50: CPT

## 2025-07-04 PROCEDURE — P9040: CPT

## 2025-07-04 PROCEDURE — 86431 RHEUMATOID FACTOR QUANT: CPT

## 2025-07-04 PROCEDURE — 0241U: CPT

## 2025-07-04 PROCEDURE — 85025 COMPLETE CBC W/AUTO DIFF WBC: CPT

## 2025-07-04 PROCEDURE — 87252 VIRUS INOCULATION TISSUE: CPT

## 2025-07-04 PROCEDURE — 86038 ANTINUCLEAR ANTIBODIES: CPT

## 2025-07-04 PROCEDURE — 71250 CT THORAX DX C-: CPT

## 2025-07-04 PROCEDURE — 83880 ASSAY OF NATRIURETIC PEPTIDE: CPT

## 2025-07-04 PROCEDURE — 87040 BLOOD CULTURE FOR BACTERIA: CPT

## 2025-07-04 PROCEDURE — 86036 ANCA SCREEN EACH ANTIBODY: CPT

## 2025-07-04 PROCEDURE — 71045 X-RAY EXAM CHEST 1 VIEW: CPT

## 2025-07-04 PROCEDURE — 87451 POLYVALENT MULT ORG EA AG IA: CPT

## 2025-07-04 PROCEDURE — 99291 CRITICAL CARE FIRST HOUR: CPT | Mod: 25

## 2025-07-04 PROCEDURE — 94660 CPAP INITIATION&MGMT: CPT

## 2025-07-04 PROCEDURE — 82550 ASSAY OF CK (CPK): CPT

## 2025-07-04 PROCEDURE — 86900 BLOOD TYPING SEROLOGIC ABO: CPT

## 2025-07-04 PROCEDURE — 78582 LUNG VENTILAT&PERFUS IMAGING: CPT

## 2025-07-04 PROCEDURE — 80048 BASIC METABOLIC PNL TOTAL CA: CPT

## 2025-07-04 PROCEDURE — 85018 HEMOGLOBIN: CPT

## 2025-07-04 PROCEDURE — 86225 DNA ANTIBODY NATIVE: CPT

## 2025-07-04 PROCEDURE — 87077 CULTURE AEROBIC IDENTIFY: CPT

## 2025-07-04 PROCEDURE — 87116 MYCOBACTERIA CULTURE: CPT

## 2025-07-04 PROCEDURE — 84145 PROCALCITONIN (PCT): CPT

## 2025-07-04 PROCEDURE — 86901 BLOOD TYPING SEROLOGIC RH(D): CPT

## 2025-07-04 PROCEDURE — 86147 CARDIOLIPIN ANTIBODY EA IG: CPT

## 2025-07-04 PROCEDURE — 85014 HEMATOCRIT: CPT

## 2025-07-04 PROCEDURE — 86480 TB TEST CELL IMMUN MEASURE: CPT

## 2025-07-04 PROCEDURE — 80053 COMPREHEN METABOLIC PANEL: CPT

## 2025-07-04 PROCEDURE — A9567: CPT

## 2025-07-04 PROCEDURE — 82962 GLUCOSE BLOOD TEST: CPT

## 2025-07-04 PROCEDURE — 93971 EXTREMITY STUDY: CPT

## 2025-07-04 PROCEDURE — 96374 THER/PROPH/DIAG INJ IV PUSH: CPT

## 2025-07-04 PROCEDURE — 93005 ELECTROCARDIOGRAM TRACING: CPT

## 2025-07-04 PROCEDURE — 83605 ASSAY OF LACTIC ACID: CPT

## 2025-07-04 PROCEDURE — 87581 M.PNEUMON DNA AMP PROBE: CPT

## 2025-07-04 PROCEDURE — 88305 TISSUE EXAM BY PATHOLOGIST: CPT

## 2025-07-04 PROCEDURE — 36600 WITHDRAWAL OF ARTERIAL BLOOD: CPT

## 2025-07-04 PROCEDURE — 87205 SMEAR GRAM STAIN: CPT

## 2025-07-04 PROCEDURE — 84484 ASSAY OF TROPONIN QUANT: CPT

## 2025-07-04 PROCEDURE — 86923 COMPATIBILITY TEST ELECTRIC: CPT

## 2025-07-04 PROCEDURE — 87081 CULTURE SCREEN ONLY: CPT

## 2025-07-04 PROCEDURE — 86146 BETA-2 GLYCOPROTEIN ANTIBODY: CPT

## 2025-07-04 PROCEDURE — 87102 FUNGUS ISOLATION CULTURE: CPT

## 2025-07-04 PROCEDURE — 87899 AGENT NOS ASSAY W/OPTIC: CPT

## 2025-07-04 PROCEDURE — 85027 COMPLETE CBC AUTOMATED: CPT

## 2025-07-04 PROCEDURE — 93970 EXTREMITY STUDY: CPT

## 2025-07-04 PROCEDURE — 94640 AIRWAY INHALATION TREATMENT: CPT

## 2025-07-04 PROCEDURE — 85652 RBC SED RATE AUTOMATED: CPT

## 2025-07-04 PROCEDURE — 86140 C-REACTIVE PROTEIN: CPT

## 2025-07-04 PROCEDURE — 97162 PT EVAL MOD COMPLEX 30 MIN: CPT

## 2025-07-04 PROCEDURE — A9540: CPT

## 2025-07-04 PROCEDURE — 83036 HEMOGLOBIN GLYCOSYLATED A1C: CPT

## 2025-07-04 PROCEDURE — 93931 UPPER EXTREMITY STUDY: CPT

## 2025-07-04 PROCEDURE — 87086 URINE CULTURE/COLONY COUNT: CPT

## 2025-07-04 PROCEDURE — 97530 THERAPEUTIC ACTIVITIES: CPT

## 2025-07-04 PROCEDURE — 94760 N-INVAS EAR/PLS OXIMETRY 1: CPT

## 2025-07-04 PROCEDURE — 85610 PROTHROMBIN TIME: CPT

## 2025-07-04 PROCEDURE — 87640 STAPH A DNA AMP PROBE: CPT

## 2025-07-04 PROCEDURE — 97116 GAIT TRAINING THERAPY: CPT

## 2025-07-04 PROCEDURE — 99239 HOSP IP/OBS DSCHRG MGMT >30: CPT

## 2025-07-04 PROCEDURE — 83735 ASSAY OF MAGNESIUM: CPT

## 2025-07-04 PROCEDURE — 36430 TRANSFUSION BLD/BLD COMPNT: CPT

## 2025-07-04 PROCEDURE — 87594 PNEUMCYSTS JIROVECII AMP PRB: CPT

## 2025-07-04 PROCEDURE — 87305 ASPERGILLUS AG IA: CPT

## 2025-07-04 PROCEDURE — 83516 IMMUNOASSAY NONANTIBODY: CPT

## 2025-07-04 PROCEDURE — 82803 BLOOD GASES ANY COMBINATION: CPT

## 2025-07-04 PROCEDURE — 92610 EVALUATE SWALLOWING FUNCTION: CPT

## 2025-07-04 PROCEDURE — 81001 URINALYSIS AUTO W/SCOPE: CPT

## 2025-07-04 RX ORDER — PREDNISONE 20 MG/1
2.5 TABLET ORAL
Qty: 10 | Refills: 0
Start: 2025-07-04 | End: 2025-07-07

## 2025-07-04 RX ORDER — MAGNESIUM OXIDE 400 MG
400 TABLET ORAL ONCE
Refills: 0 | Status: COMPLETED | OUTPATIENT
Start: 2025-07-04 | End: 2025-07-04

## 2025-07-04 RX ORDER — HEPARIN SODIUM,PORCINE/NS/PF 20/20 ML
300 SYRINGE (ML) INTRAVENOUS ONCE
Refills: 0 | Status: COMPLETED | OUTPATIENT
Start: 2025-07-04 | End: 2025-07-04

## 2025-07-04 RX ORDER — SOD PHOS DI, MONO/K PHOS MONO 250 MG
1 TABLET ORAL ONCE
Refills: 0 | Status: COMPLETED | OUTPATIENT
Start: 2025-07-04 | End: 2025-07-04

## 2025-07-04 RX ORDER — PREDNISOLONE 5 MG
1.5 TABLET ORAL
Qty: 1.5 | Refills: 0
Start: 2025-07-04

## 2025-07-04 RX ORDER — SOD PHOS DI, MONO/K PHOS MONO 250 MG
1 TABLET ORAL
Qty: 4 | Refills: 0
Start: 2025-07-04 | End: 2025-07-11

## 2025-07-04 RX ORDER — MAGNESIUM OXIDE 400 MG
1 TABLET ORAL
Qty: 30 | Refills: 0
Start: 2025-07-04 | End: 2025-08-02

## 2025-07-04 RX ADMIN — Medication 4 MILLILITER(S): at 07:59

## 2025-07-04 RX ADMIN — Medication 300 UNIT(S): at 12:08

## 2025-07-04 RX ADMIN — TORSEMIDE 20 MILLIGRAM(S): 20 TABLET ORAL at 06:28

## 2025-07-04 RX ADMIN — Medication 1 APPLICATION(S): at 06:28

## 2025-07-04 RX ADMIN — Medication 40 MILLIGRAM(S): at 09:29

## 2025-07-04 RX ADMIN — IPRATROPIUM BROMIDE AND ALBUTEROL SULFATE 3 MILLILITER(S): .5; 2.5 SOLUTION RESPIRATORY (INHALATION) at 07:59

## 2025-07-04 RX ADMIN — Medication 1 TABLET(S): at 11:43

## 2025-07-04 RX ADMIN — Medication 400 MILLIGRAM(S): at 11:43

## 2025-07-04 RX ADMIN — Medication 25 MILLIGRAM(S): at 06:28

## 2025-07-04 RX ADMIN — APIXABAN 2.5 MILLIGRAM(S): 2.5 TABLET, FILM COATED ORAL at 09:29

## 2025-07-04 RX ADMIN — AMIODARONE HYDROCHLORIDE 100 MILLIGRAM(S): 50 INJECTION, SOLUTION INTRAVENOUS at 06:28

## 2025-07-04 NOTE — PROGRESS NOTE ADULT - SUBJECTIVE AND OBJECTIVE BOX
[facility] -- Sai Valle, Girish Jackson Savella, , Rene Hernandez  Office # [phone]    Follow Up:  Resp failure, hypotension, Afib     Subjective/Observations: Seen and evaluated.  NC at 3L/min in use.  Wore CPAP overnight.  Denies cough, hemoptysis, SWAIN or orthopnea.  Denies CP or palpitations    REVIEW OF SYSTEMS: All other review of systems is negative unless indicated above  PAST MEDICAL & SURGICAL HISTORY:  COPD (chronic obstructive pulmonary disease)      DM (diabetes mellitus)  diet-controlled      PAF (paroxysmal atrial fibrillation)  s/p ablation      Hiatal hernia      H/O aplastic anemia      History of IBS      H/O osteoporosis      HLD (hyperlipidemia)      PMR (polymyalgia rheumatica)      History of basal cell cancer      Chronic kidney disease (CKD)      Hypotension      Presence of IVC filter      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      History of immunodeficiency      Lumbar compression fracture      H/O hiatal hernia      H/O pulmonary fibrosis      H/O sinus bradycardia      History of fracture of right hip  "unoperable"      S/P hysterectomy      S/P foot surgery      S/P knee surgery      After cataract  bilateral eye cataract surgically removed      Closed right hip fracture  rigth hip ORIF july 19 2016      S/P IVC filter  nov 2016      S/P carpal tunnel release  Right 2008 & 6/27/17      H/O kyphoplasty      Port-A-Cath in place  right chest        MEDICATIONS  (STANDING):  albuterol/ipratropium for Nebulization 3 milliLiter(s) Nebulizer three times a day  aMIOdarone    Tablet 100 milliGRAM(s) Oral daily  apixaban 2.5 milliGRAM(s) Oral every 12 hours  chlorhexidine 2% Cloths 1 Application(s) Topical <User Schedule>  dextrose 5%. 1000 milliLiter(s) (50 mL/Hr) IV Continuous <Continuous>  dextrose 5%. 1000 milliLiter(s) (100 mL/Hr) IV Continuous <Continuous>  dextrose 50% Injectable 12.5 Gram(s) IV Push once  dextrose 50% Injectable 25 Gram(s) IV Push once  dextrose 50% Injectable 25 Gram(s) IV Push once  fluticasone propionate/ salmeterol 500-50 MICROgram(s) Diskus 1 Dose(s) Inhalation two times a day  glucagon  Injectable 1 milliGRAM(s) IntraMuscular once  hydrocortisone sodium succinate Injectable 25 milliGRAM(s) IV Push daily  insulin lispro (ADMELOG) corrective regimen sliding scale   SubCutaneous three times a day before meals  insulin lispro (ADMELOG) corrective regimen sliding scale   SubCutaneous at bedtime  magnesium oxide 400 milliGRAM(s) Oral once  melatonin 5 milliGRAM(s) Oral at bedtime  pantoprazole  Injectable 40 milliGRAM(s) IV Push daily  potassium phosphate / sodium phosphate Tablet (K-PHOS No. 2) 1 Tablet(s) Oral once  sodium chloride 3%  Inhalation 4 milliLiter(s) Inhalation three times a day  torsemide 20 milliGRAM(s) Oral daily    MEDICATIONS  (PRN):  acetaminophen     Tablet .. 650 milliGRAM(s) Oral every 6 hours PRN Mild Pain (1 - 3)  dextrose Oral Gel 15 Gram(s) Oral once PRN Blood Glucose LESS THAN 70 milliGRAM(s)/deciliter    Allergies    No Known Allergies    Intolerances      Vital Signs Last 24 Hrs  T(C): 37 (04 Jul 2025 05:00), Max: 37 (04 Jul 2025 05:00)  T(F): 98.6 (04 Jul 2025 05:00), Max: 98.6 (04 Jul 2025 05:00)  HR: 80 (04 Jul 2025 08:01) (74 - 89)  BP: 125/75 (04 Jul 2025 05:00) (124/76 - 129/67)  BP(mean): --  RR: 18 (04 Jul 2025 05:00) (18 - 20)  SpO2: 95% (04 Jul 2025 08:01) (94% - 99%)    Parameters below as of 04 Jul 2025 08:01  Patient On (Oxygen Delivery Method): nasal cannula      I&O's Summary      PHYSICAL EXAM:  TELE: Not on tele  Constitutional: NAD, awake and alert  HEENT: Moist Mucous Membranes, Anicteric  Pulmonary: Non-labored, breath sounds are diminished bilaterally, No wheezing, rales or rhonchi  Cardiovascular: Regular, S1 and S2, +murmurs, no rubs, gallops or clicks  Gastrointestinal: Bowel Sounds present, soft, nontender.   Lymph: 1+peripheral edema. No lymphadenopathy.  Skin: No visible rashes or ulcers.  Psych:  Mood & affect appropriate     LABS: All Labs Reviewed:                        8.5    3.15  )-----------( 115      ( 03 Jul 2025 07:36 )             27.4                         8.0    2.37  )-----------( 106      ( 02 Jul 2025 06:55 )             25.9     04 Jul 2025 06:41    142    |  101    |  20     ----------------------------<  83     4.5     |  41     |  0.73   03 Jul 2025 07:36    144    |  98     |  24     ----------------------------<  101    3.2     |  41     |  0.52   02 Jul 2025 06:55    144    |  101    |  28     ----------------------------<  102    3.7     |  37     |  0.92     Ca    8.0        04 Jul 2025 06:41  Ca    7.9        03 Jul 2025 07:36  Ca    8.3        02 Jul 2025 06:55  Phos  2.1       04 Jul 2025 06:41  Mg     1.4       04 Jul 2025 06:41    TPro  5.4    /  Alb  2.9    /  TBili  0.4    /  DBili  x      /  AST  17     /  ALT  38     /  AlkPhos  58     04 Jul 2025 06:41  TPro  6.0    /  Alb  3.1    /  TBili  0.4    /  DBili  x      /  AST  27     /  ALT  42     /  AlkPhos  74     03 Jul 2025 07:36  TPro  5.7    /  Alb  3.0    /  TBili  0.4    /  DBili  x      /  AST  15     /  ALT  34     /  AlkPhos  62     02 Jul 2025 06:55          12 Lead ECG:   Ventricular Rate 57 BPM    Atrial Rate 57 BPM    P-R Interval 128 ms    QRS Duration 90 ms    Q-T Interval 508 ms    QTC Calculation(Bazett) 494 ms    P Axis 64 degrees    R Axis 15 degrees    T Axis 84 degrees    Diagnosis Line Sinus bradycardia with premature atrial complexes  Prolonged QT  Abnormal ECG  When compared with ECG of 19-JUN-2025 04:43,  Vent. rate has decreased BY  31 BPM  Borderline criteria for Lateral infarct are no longer present  Non-specific change in ST segment in Inferiorleads (06-28-25 @ 09:15)    TRANSTHORACIC ECHOCARDIOGRAM REPORT  ________________________________________________________________________________                                      ______     Pt. Name:       MARTINEZ JONES Study Date:    6/12/2025  MRN:            [id number]        YOB: 1940  Accession #:    [id number]       Age:           84 years  Account#:       [id number]      Gender:        F  Heart Rate:                     Height:        62.99 in (160.00 cm)  Rhythm:                         Weight:        147.71 lb (67.00 kg)  Blood Pressure: 109/63 mmHg     BSA/BMI:       1.70 m² / 26.17 kg/m²  ________________________________________________________________________________________  Referring Physician:    [id number] Naresh Vitale  Interpreting Physician: Daniel Jorge M.D.  Primary Sonographer:    Jamal Hayes    CPT:               ECHO TTE WO CON COMP W DOPP - 06822.m  Indication(s):     Shock, unspecified - R57.9  Procedure:         Transthoracic echocardiogram with 2-D, M-mode and complete                     spectral and color flow Doppler.  Ordering Location: ICU1  Admission Status:  Inpatient  Study Information: Image quality for this study is technically difficult.    _______________________________________________________________________________________     CONCLUSIONS:      1. Technically difficult image quality.   2. Left ventricular systolic function is normal with an ejection fraction of 67 % by Arias's method of disks.   3. Mild left ventricular hypertrophy.   4.Normal right ventricular cavity size and probably normal right ventricular systolic function.   5. Tricuspid aortic valve with normal leaflet excursion. There is calcification of the aortic valve leaflets.   6. Moderate mitral valve leaflet calcification.   7. Mild mitral regurgitation.   8. Moderate tricuspid regurgitation.   9. The inferior vena cava is normal in size measuring 1.64 cm in diameter, (normal <2.1cm) with normal inspiratory collapse (normal >50%) consistent with normal right atrial pressure (~3, range 0-5mmHg).  10. Estimated pulmonary artery systolic pressure is 57 mmHg, consistent with moderate-to-severe pulmonary hypertension.  11. Trace pericardial effusion.  12. Right pleural effusion noted.  13. Compared to the transthoracic echocardiogram performed on 6/2/2025, there have been no significant interval changes.    ________________________________________________________________________________________  FINDINGS:     Left Ventricle:  Left ventricular systolic function is normal with a calculated ejection fraction of 67 % by the Arias's biplane method of disks. Mild left ventricular hypertrophy.     Right Ventricle:  The right ventricular cavity is normal in size and right ventricular systolic function is probablynormal. Tricuspid annular plane systolic excursion (TAPSE) is 1.9 cm (normal >=1.7 cm).     Left Atrium:  The left atrium is normal in size with an indexed volume of 29.06 ml/m².     Right Atrium:  The right atrium is normal in size with an indexed volume of 26.00 ml/m² and an indexed area of 9.41 cm²/m².     Interatrial Septum:  The interatrial septum appears intact.     Aortic Valve:  The aortic valve is tricuspid with normal leaflet excursion. There is calcification of the aortic valve leaflets. There is mild thickening of the aortic valve leaflets. There is trace aortic regurgitation.     Mitral Valve:  Structurally normal mitral valve with normal leaflet excursion. There is calcification of the mitral valve annulus. There is moderate leaflet calcification. There is mild mitral regurgitation.     Tricuspid Valve:  The tricuspid valve is structurally normal with normal leaflet excursion. There is moderate tricuspid regurgitation. Estimated pulmonary artery systolic pressure is 57 mmHg,consistent with moderate-to-severe pulmonary hypertension.     Pulmonic Valve:  The pulmonic valve was not well visualized. There is trace pulmonic regurgitation.     Aorta:  The aortic root at the sinuses of Valsalva is normal in size, measuring 3.30 cm (indexed 1.94 cm/m²). The ascending aorta is normal in size, measuring 3.00 cm (indexed 1.76 cm/m²). The aortic arch diameter is normal in size, measuring 2.4 cm (indexed 1.41 cm/m²).     Pericardium:  There is a trace pericardial effusion.     Pleura:  Right pleural effusion noted.     Systemic Veins:  The inferior vena cava is normal in size measuring 1.64 cm in diameter, (normal <2.1cm) with normal inspiratory collapse (normal >50%) consistent with normal right atrial pressure (~3, range 0-5mmHg).  ____________________________________________________________________  QUANTITATIVE DATA:  Left Ventricle Measurements: (Indexed to BSA)     IVSd (2D):   1.2 cm  LVPWd (2D):  1.1 cm  LVIDd (2D):  4.2 cm  LVIDs (2D):  2.4 cm  LV Mass:     169 g  99.5 g/m²  LV Vol d, MOD A2C: 38.9 ml 22.88 ml/m²  LV Vol d, MOD A4C: 40.9 ml 24.06 ml/m²  LV Vol d, MOD BP:  40.9 ml 24.04 ml/m²  LV Vol s, MOD A2C: 11.3 ml 6.65 ml/m²  LV Vol s, MOD A4C: 13.4 ml 7.88 ml/m²  LV Vol s, MOD BP:  13.4 ml 7.91 ml/m²  LVOT SV MOD BP:    27.4 ml  LV EF% MOD BP:     67 %     MV E Vmax:    1.54 m/s  MV A Vmax:    0.74 m/s  MV E/A:       2.08  e' lateral:   11.90 cm/s  e' medial:    8.81 cm/s  E/e' lateral: 12.94  E/e' medial:  17.48  E/e' Average: 14.87  MV DT:272 msec    Aorta Measurements: (Normal range) (Indexed to BSA)     Ao Root d     3.30 cm (2.7 - 3.3 cm) 1.94 cm/m²  Ao Asc d, 2D: 3.00  Ao Asc prox:  3.00 cm                1.76 cm/m²  Ao Arch:      2.4 cm    Left Atrium Measurements: (Indexed to BSA)  LA Diam 2D: 3.60 cm         Right Ventricle Measurements: Right Atrial Measurements:     TAPSE:            1.9 cm      RA Vol s, MOD A4C         44.2 ml  RV Base (RVID1):  2.9 cm      RA Vol s, MOD A4C i BSA   26.00 ml/m²  RV Mid (RVID2): 2.5 cm      RA Area s, MOD A4C        16.0 cm²  RV Major (RVID3): 6.4 cm      RA Area s, MOD A4C, i BSA 9.41 cm²/m²       LVOT / RVOT/ Qp/Qs Data: (Indexed to BSA)  LVOT Diameter,s: 2.20 cm  LVOT Area:       3.80 cm²    Mitral Valve Measurements:     MV E Vmax: 1.5 m/s         MR Vmax:          3.80 m/s  MV A Vmax: 0.7 m/s         MR Peak Gradient: 57.8 mmHg  MV E/A:    2.1       Tricuspid Valve Measurements:     TR Vmax:          3.7 m/s  TR Peak Gradient: 54.5 mmHg  RA Pressure:      3 mmHg  PASP:             57 mmHg    ________________________________________________________________________________________  Electronically signed on 6/13/2025 at 11:19:51 AM by Daniel Jorge M.D.         *** Final ***      [facility] -- Sai Valle, Girish Jackson Savella, , Rene Hernandez  Office # [phone]    Follow Up:  Resp failure, hypotension, Afib     Subjective/Observations: Seen and evaluated.  NC at 3L/min in use.  Wore CPAP overnight.  Denies cough, hemoptysis, SWAIN or orthopnea.  Denies CP or palpitations    REVIEW OF SYSTEMS: All other review of systems is negative unless indicated above  PAST MEDICAL & SURGICAL HISTORY:  COPD (chronic obstructive pulmonary disease)      DM (diabetes mellitus)  diet-controlled      PAF (paroxysmal atrial fibrillation)  s/p ablation      Hiatal hernia      H/O aplastic anemia      History of IBS      H/O osteoporosis      HLD (hyperlipidemia)      PMR (polymyalgia rheumatica)      History of basal cell cancer      Chronic kidney disease (CKD)      Hypotension      Presence of IVC filter      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      History of immunodeficiency      Lumbar compression fracture      H/O hiatal hernia      H/O pulmonary fibrosis      H/O sinus bradycardia      History of fracture of right hip  "unoperable"      S/P hysterectomy      S/P foot surgery      S/P knee surgery      After cataract  bilateral eye cataract surgically removed      Closed right hip fracture  rigth hip ORIF july 19 2016      S/P IVC filter  nov 2016      S/P carpal tunnel release  Right 2008 & 6/27/17      H/O kyphoplasty      Port-A-Cath in place  right chest        MEDICATIONS  (STANDING):  albuterol/ipratropium for Nebulization 3 milliLiter(s) Nebulizer three times a day  aMIOdarone    Tablet 100 milliGRAM(s) Oral daily  apixaban 2.5 milliGRAM(s) Oral every 12 hours  chlorhexidine 2% Cloths 1 Application(s) Topical <User Schedule>  dextrose 5%. 1000 milliLiter(s) (50 mL/Hr) IV Continuous <Continuous>  dextrose 5%. 1000 milliLiter(s) (100 mL/Hr) IV Continuous <Continuous>  dextrose 50% Injectable 12.5 Gram(s) IV Push once  dextrose 50% Injectable 25 Gram(s) IV Push once  dextrose 50% Injectable 25 Gram(s) IV Push once  fluticasone propionate/ salmeterol 500-50 MICROgram(s) Diskus 1 Dose(s) Inhalation two times a day  glucagon  Injectable 1 milliGRAM(s) IntraMuscular once  hydrocortisone sodium succinate Injectable 25 milliGRAM(s) IV Push daily  insulin lispro (ADMELOG) corrective regimen sliding scale   SubCutaneous three times a day before meals  insulin lispro (ADMELOG) corrective regimen sliding scale   SubCutaneous at bedtime  magnesium oxide 400 milliGRAM(s) Oral once  melatonin 5 milliGRAM(s) Oral at bedtime  pantoprazole  Injectable 40 milliGRAM(s) IV Push daily  potassium phosphate / sodium phosphate Tablet (K-PHOS No. 2) 1 Tablet(s) Oral once  sodium chloride 3%  Inhalation 4 milliLiter(s) Inhalation three times a day  torsemide 20 milliGRAM(s) Oral daily    MEDICATIONS  (PRN):  acetaminophen     Tablet .. 650 milliGRAM(s) Oral every 6 hours PRN Mild Pain (1 - 3)  dextrose Oral Gel 15 Gram(s) Oral once PRN Blood Glucose LESS THAN 70 milliGRAM(s)/deciliter    Allergies    No Known Allergies    Intolerances      Vital Signs Last 24 Hrs  T(C): 37 (04 Jul 2025 05:00), Max: 37 (04 Jul 2025 05:00)  T(F): 98.6 (04 Jul 2025 05:00), Max: 98.6 (04 Jul 2025 05:00)  HR: 80 (04 Jul 2025 08:01) (74 - 89)  BP: 125/75 (04 Jul 2025 05:00) (124/76 - 129/67)  BP(mean): --  RR: 18 (04 Jul 2025 05:00) (18 - 20)  SpO2: 95% (04 Jul 2025 08:01) (94% - 99%)    Parameters below as of 04 Jul 2025 08:01  Patient On (Oxygen Delivery Method): nasal cannula      I&O's Summary      PHYSICAL EXAM:  TELE: Not on tele  Constitutional: NAD, awake and alert  HEENT: Moist Mucous Membranes, Anicteric  Pulmonary: Non-labored, breath sounds are diminished bilaterally, No wheezing, rales or rhonchi  Cardiovascular: Regular, S1 and S2, +murmurs, no rubs, gallops or clicks  Gastrointestinal: Bowel Sounds present, soft, nontender.   Lymph: 1+peripheral edema. No lymphadenopathy.  Skin: No visible rashes or ulcers.  Psych:  Mood & affect appropriate     LABS: All Labs Reviewed:                        8.5    3.15  )-----------( 115      ( 03 Jul 2025 07:36 )             27.4                         8.0    2.37  )-----------( 106      ( 02 Jul 2025 06:55 )             25.9     04 Jul 2025 06:41    142    |  101    |  20     ----------------------------<  83     4.5     |  41     |  0.73   03 Jul 2025 07:36    144    |  98     |  24     ----------------------------<  101    3.2     |  41     |  0.52   02 Jul 2025 06:55    144    |  101    |  28     ----------------------------<  102    3.7     |  37     |  0.92     Ca    8.0        04 Jul 2025 06:41  Ca    7.9        03 Jul 2025 07:36  Ca    8.3        02 Jul 2025 06:55  Phos  2.1       04 Jul 2025 06:41  Mg     1.4       04 Jul 2025 06:41    TPro  5.4    /  Alb  2.9    /  TBili  0.4    /  DBili  x      /  AST  17     /  ALT  38     /  AlkPhos  58     04 Jul 2025 06:41  TPro  6.0    /  Alb  3.1    /  TBili  0.4    /  DBili  x      /  AST  27     /  ALT  42     /  AlkPhos  74     03 Jul 2025 07:36  TPro  5.7    /  Alb  3.0    /  TBili  0.4    /  DBili  x      /  AST  15     /  ALT  34     /  AlkPhos  62     02 Jul 2025 06:55          12 Lead ECG:   Ventricular Rate 57 BPM    Atrial Rate 57 BPM    P-R Interval 128 ms    QRS Duration 90 ms    Q-T Interval 508 ms    QTC Calculation(Bazett) 494 ms    P Axis 64 degrees    R Axis 15 degrees    T Axis 84 degrees    Diagnosis Line Sinus bradycardia with premature atrial complexes  Prolonged QT  Abnormal ECG  When compared with ECG of 19-JUN-2025 04:43,  Vent. rate has decreased BY  31 BPM  Borderline criteria for Lateral infarct are no longer present  Non-specific change in ST segment in Inferiorleads (06-28-25 @ 09:15)    TRANSTHORACIC ECHOCARDIOGRAM REPORT  ________________________________________________________________________________                                      ______     Pt. Name:       MARTINEZ JONES Study Date:    6/12/2025  MRN:            [id number]        YOB: 1940  Accession #:    [id number]       Age:           84 years  Account#:       [id number]      Gender:        F  Heart Rate:                     Height:        62.99 in (160.00 cm)  Rhythm:                         Weight:        147.71 lb (67.00 kg)  Blood Pressure: 109/63 mmHg     BSA/BMI:       1.70 m² / 26.17 kg/m²  ________________________________________________________________________________________  Referring Physician:    [id number] Naresh Vitale  Interpreting Physician: Daniel Jorge M.D.  Primary Sonographer:    Jamal Hayes    CPT:               ECHO TTE WO CON COMP W DOPP - 38214.m  Indication(s):     Shock, unspecified - R57.9  Procedure:         Transthoracic echocardiogram with 2-D, M-mode and complete                     spectral and color flow Doppler.  Ordering Location: ICU1  Admission Status:  Inpatient  Study Information: Image quality for this study is technically difficult.    _______________________________________________________________________________________     CONCLUSIONS:      1. Technically difficult image quality.   2. Left ventricular systolic function is normal with an ejection fraction of 67 % by Arias's method of disks.   3. Mild left ventricular hypertrophy.   4.Normal right ventricular cavity size and probably normal right ventricular systolic function.   5. Tricuspid aortic valve with normal leaflet excursion. There is calcification of the aortic valve leaflets.   6. Moderate mitral valve leaflet calcification.   7. Mild mitral regurgitation.   8. Moderate tricuspid regurgitation.   9. The inferior vena cava is normal in size measuring 1.64 cm in diameter, (normal <2.1cm) with normal inspiratory collapse (normal >50%) consistent with normal right atrial pressure (~3, range 0-5mmHg).  10. Estimated pulmonary artery systolic pressure is 57 mmHg, consistent with moderate-to-severe pulmonary hypertension.  11. Trace pericardial effusion.  12. Right pleural effusion noted.  13. Compared to the transthoracic echocardiogram performed on 6/2/2025, there have been no significant interval changes.    ________________________________________________________________________________________  FINDINGS:     Left Ventricle:  Left ventricular systolic function is normal with a calculated ejection fraction of 67 % by the Arias's biplane method of disks. Mild left ventricular hypertrophy.     Right Ventricle:  The right ventricular cavity is normal in size and right ventricular systolic function is probablynormal. Tricuspid annular plane systolic excursion (TAPSE) is 1.9 cm (normal >=1.7 cm).     Left Atrium:  The left atrium is normal in size with an indexed volume of 29.06 ml/m².     Right Atrium:  The right atrium is normal in size with an indexed volume of 26.00 ml/m² and an indexed area of 9.41 cm²/m².     Interatrial Septum:  The interatrial septum appears intact.     Aortic Valve:  The aortic valve is tricuspid with normal leaflet excursion. There is calcification of the aortic valve leaflets. There is mild thickening of the aortic valve leaflets. There is trace aortic regurgitation.     Mitral Valve:  Structurally normal mitral valve with normal leaflet excursion. There is calcification of the mitral valve annulus. There is moderate leaflet calcification. There is mild mitral regurgitation.     Tricuspid Valve:  The tricuspid valve is structurally normal with normal leaflet excursion. There is moderate tricuspid regurgitation. Estimated pulmonary artery systolic pressure is 57 mmHg,consistent with moderate-to-severe pulmonary hypertension.     Pulmonic Valve:  The pulmonic valve was not well visualized. There is trace pulmonic regurgitation.     Aorta:  The aortic root at the sinuses of Valsalva is normal in size, measuring 3.30 cm (indexed 1.94 cm/m²). The ascending aorta is normal in size, measuring 3.00 cm (indexed 1.76 cm/m²). The aortic arch diameter is normal in size, measuring 2.4 cm (indexed 1.41 cm/m²).     Pericardium:  There is a trace pericardial effusion.     Pleura:  Right pleural effusion noted.     Systemic Veins:  The inferior vena cava is normal in size measuring 1.64 cm in diameter, (normal <2.1cm) with normal inspiratory collapse (normal >50%) consistent with normal right atrial pressure (~3, range 0-5mmHg).  ____________________________________________________________________  QUANTITATIVE DATA:  Left Ventricle Measurements: (Indexed to BSA)     IVSd (2D):   1.2 cm  LVPWd (2D):  1.1 cm  LVIDd (2D):  4.2 cm  LVIDs (2D):  2.4 cm  LV Mass:     169 g  99.5 g/m²  LV Vol d, MOD A2C: 38.9 ml 22.88 ml/m²  LV Vol d, MOD A4C: 40.9 ml 24.06 ml/m²  LV Vol d, MOD BP:  40.9 ml 24.04 ml/m²  LV Vol s, MOD A2C: 11.3 ml 6.65 ml/m²  LV Vol s, MOD A4C: 13.4 ml 7.88 ml/m²  LV Vol s, MOD BP:  13.4 ml 7.91 ml/m²  LVOT SV MOD BP:    27.4 ml  LV EF% MOD BP:     67 %     MV E Vmax:    1.54 m/s  MV A Vmax:    0.74 m/s  MV E/A:       2.08  e' lateral:   11.90 cm/s  e' medial:    8.81 cm/s  E/e' lateral: 12.94  E/e' medial:  17.48  E/e' Average: 14.87  MV DT:272 msec    Aorta Measurements: (Normal range) (Indexed to BSA)     Ao Root d     3.30 cm (2.7 - 3.3 cm) 1.94 cm/m²  Ao Asc d, 2D: 3.00  Ao Asc prox:  3.00 cm                1.76 cm/m²  Ao Arch:      2.4 cm    Left Atrium Measurements: (Indexed to BSA)  LA Diam 2D: 3.60 cm         Right Ventricle Measurements: Right Atrial Measurements:     TAPSE:            1.9 cm      RA Vol s, MOD A4C         44.2 ml  RV Base (RVID1):  2.9 cm      RA Vol s, MOD A4C i BSA   26.00 ml/m²  RV Mid (RVID2): 2.5 cm      RA Area s, MOD A4C        16.0 cm²  RV Major (RVID3): 6.4 cm      RA Area s, MOD A4C, i BSA 9.41 cm²/m²       LVOT / RVOT/ Qp/Qs Data: (Indexed to BSA)  LVOT Diameter,s: 2.20 cm  LVOT Area:       3.80 cm²    Mitral Valve Measurements:     MV E Vmax: 1.5 m/s         MR Vmax:          3.80 m/s  MV A Vmax: 0.7 m/s         MR Peak Gradient: 57.8 mmHg  MV E/A:    2.1       Tricuspid Valve Measurements:     TR Vmax:          3.7 m/s  TR Peak Gradient: 54.5 mmHg  RA Pressure:      3 mmHg  PASP:             57 mmHg    ________________________________________________________________________________________  Electronically signed on 6/13/2025 at 11:19:51 AM by Daniel Jorge M.D.         *** Final ***

## 2025-07-04 NOTE — PROGRESS NOTE ADULT - ASSESSMENT
84 female PMH of A-fib on Eliquis, COPD, CKD, aplastic anemia, polymyalgia rheumatica, on chronic steroids, avascular necrosis of hips, HLD, HTN, DM, recent admission to [place] 5/26 through 6/5/2025 for CHF/fluid overload/COPD exacerbation, presents to the ED form [facility] for evaluation of fever and generalized feeling of being unwell, found to be septic and hypotensive.     Sepsis, Hypotension, AHRF, PAfib  - CT chest 6/10 with multifocal R sided PNA  - hx copd on home o2 , baseline o2 keep > 88%.  Downtitrate as needed.  Tolerating 3L NC.  Continue to wean as able  - S/p bronch, 6/16.  Showed blood oozing from RUL but no lesion.  No further hemoptysis  - Pulm folowing  - Encourage Incentive spirometer    - 6/22 had AMS from CO2 narcosis.  Resolved  - s/p BIPAP.  Compliance with cpap recommended    - No anginal complaints  - EKG x 2 showed NSR with PAC, non-ischemic.    - Troponin negative    - Known PAfib, on AC  - Back on home Eliquis 2.5 mg q12H.  Monitor for further hemoptysis  - NSR with no significant events on tele, now off  - Continue Amiodarone    - Echo 6/12/25 as above EF 67% mild LVH, mod MAC, mild MR, mod to severe pHTN  - No evidence of any meaningful volume overload   - s/p IV Lasix.  Now back on home Torsemide     - Continue off Midodrine and Northera  - BP remains stable    - Known hx of dysautonomia  - BP remains stable and controlled     - Monitor and replete lytes, keep K>4, Mg>2.  - Will continue to follow.    Shaneka Rogers DNP, NP-C, AGACNP-C  Cardiology   Call TEAMS          84 female PMH of A-fib on Eliquis, COPD, CKD, aplastic anemia, polymyalgia rheumatica, on chronic steroids, avascular necrosis of hips, HLD, HTN, DM, recent admission to [place] 5/26 through 6/5/2025 for CHF/fluid overload/COPD exacerbation, presents to the ED form [facility] for evaluation of fever and generalized feeling of being unwell, found to be septic and hypotensive.     Sepsis, Hypotension, AHRF, PAfib  - CT chest 6/10 with multifocal R sided PNA  - hx copd on home o2 , baseline o2 keep > 88%.  Downtitrate as needed.  Tolerating 3L NC.  Continue to wean as able  - S/p bronch, 6/16.  Showed blood oozing from RUL but no lesion.  No further hemoptysis  - Pulm following  - Encourage Incentive spirometer    - 6/22 had AMS from CO2 narcosis.  Resolved  - s/p BIPAP.  Compliance with cpap recommended    - No anginal complaints  - EKG x 2 showed NSR with PAC, non-ischemic.    - Troponin negative    - Known PAfib, on AC  - Back on home Eliquis 2.5 mg q12H.  Monitor for further hemoptysis  - NSR with no significant events on tele, now off  - Continue Amiodarone    - Echo 6/12/25 as above EF 67% mild LVH, mod MAC, mild MR, mod to severe pHTN  - No evidence of any meaningful volume overload   - s/p IV Lasix.  Now back on home Torsemide     - Continue off Midodrine and Northera  - BP remains stable    - Known hx of dysautonomia  - BP remains stable and controlled     - Monitor and replete lytes, keep K>4, Mg>2.  - Will continue to follow.    Shaneka Rogers DNP, NP-C, AGACNP-C  Cardiology   Call TEAMS

## 2025-07-04 NOTE — PROGRESS NOTE ADULT - REASON FOR ADMISSION
septic shock, PNA

## 2025-07-04 NOTE — PROGRESS NOTE ADULT - PROBLEM SELECTOR PLAN 1
cont low dose admelog corrective scale coverage qac/qhs  cont cons cho diet  bg numbers at goal in hosp setting.

## 2025-07-04 NOTE — PROGRESS NOTE ADULT - PROBLEM SELECTOR PROBLEM 1
Myelodysplasia, high grade
Type 2 diabetes mellitus
Myelodysplasia, high grade
Type 2 diabetes mellitus
Myelodysplasia, high grade
History of hemoptysis
History of hemoptysis
Myelodysplasia, high grade
Myelodysplasia, high grade
Acute respiratory failure with hypoxia and hypercapnia
Myelodysplasia, high grade
Myelodysplasia, high grade
Septic shock
Myelodysplasia, high grade
Myelodysplasia, high grade
History of hemoptysis
Acute respiratory failure with hypoxia and hypercapnia
History of hemoptysis
History of hemoptysis
Acute respiratory failure with hypoxia and hypercapnia
History of hemoptysis
History of hemoptysis
Septic shock
Septic shock
History of hemoptysis
Septic shock

## 2025-07-04 NOTE — PROGRESS NOTE ADULT - ASSESSMENT
REASON FOR VISIT  .. Management of problems listed below      EVENTS/CURRENT ISSUES.  . 6/28/2025 tr to icu (had refused avaps last noc)   . 6/26/2025 No further hemoptysis while off apixaba   . 6/24/2025 tr out of icu   . 6/23/2025 tr icu  . 6/22/2025 worsening gas exchange placed on hfnc apixa stoppd   . 6/20/2025 apixa restartd and noc bpap orderde   . 6/16/2025 fob done oozing rul and l lo lobe post basal   . 6/14/2025 iv ufh dced   . 6/13/2025 continues to have mild hemoptysis but is also on iv ufh drip   . 6/13/2025 dw pt re rbaa of bronch and se agrees scheduled for 6/16 10 in or   . 6/13/2025 dw cardio and id   . 6/11/2025 qft funfitell and galactomannan orderd   . 6/11/2025 pt wa sstarted on iv ufh for hemoptysis and pleuritic chest pain but was stopped when vq showed vlp   . 6/10/2025 Mild hemoptysis   . 6/9 tr out of icu  . 6/8/2025  . PNEUMONIA RML 6/8/2025  . SHOCK 6/8/2025   . NOREPI 6/8/2025   . STRESS STEROIDS   .... HYDROCORTISONE 6/8/2025  . A fib (chronic) POA 6/8/2025  . ANEMIA Hb 6/8/2025 Hb 7.5  . KIMBERLY ON CKD Cr 6/8/2025 Cr 1.9        REVIEW OF SYMPTOMS   Able to give ROS  Yes     RELIABILITY +/-   CONSTITUTIONAL Weakness Yes    ENDOCRINE  No heat or cold intolerance    ALLERGY No hives  Sore throat No stridor  RESP Shortness of breath YES   NEURO New weakness No   CARDIAC   Palpitations No         PHYSICAL EXAM    HEENT Unremarkable  atraumatic   RESP Fair air entry  Harsh breath sound   CARDIAC S1 S2 No S3     NO JVD    ABDOMEN No hepatosplenomegaly   PEDAL EDEMA present No calf tenderness    REASON FOR VISIT  .. Management of problems listed below      CC.   . 6/8/2025 brought in by ems for right sided chest pain that started at 3am- nonradiating- patient was offered aspirin by ems- patient refused and stated to ems that she is on plavix and was advised not to take aspirin. patient on 43 litres nasl cannula-     OVERALL PRESENTATION.  . 6/8/2025 84 yr old female with PMHx afib on eliquis, COPD (not on home O2), PE/Rt lower extremity DVT 2017, Rt LE IVC filter 2017 CKD3, aplastic anemia, polymyalgia rheumatica on prednisone 5 mg po qd, requiring PRBC transfusions ~8 weeks (via Rt subclavian mediport), AVN bilat hips, HTN, HLD, HFpEF (75% 6.2.25), diastolic dysfx grade1, recent hospitalization 5/25/25-6/5/25 for ADHF/COPD exacerbation, Pt with eventual improvement and transfer to [facility] facility from where she presents to E.D. this a.m. 6/8/25 with c/o Rt sided chest pain. general malaise. Pt stated began to feel ill evening before presentation, daughter this a.m. endorsed pt lethargic, pale.     In E.D. Pt found to have fever with tmax of 101, KIMBERLY on CKD with sCr 1.90 (baseline 1.2-1.6), HYPOtnsive with SBP 80's (pt on midodrine therapy, usual 's). In addition found to have leukocytosis of 37.6 (baseline 5.25 6/5/25), procalcitonin of 13.6, Hgb 7.5, elevated INR 2.27,  Chest xray demonstrated RML consolidations, concerning for pneum. In E.D. pt receiving 500 cc's NS, Vanco 1 gm, zosyn. Pt received tylenol 1 gm IV x1.     PMH.        BEST PRACTICE ISSUES.  . HOB ELEVATN.    .... Yes  . DIET  .   .... DASH 6/8/2025   . FREE WATER.   ....   .  IV FLUID.  .... 6/15-6/20/2025 1/2 ns 50   ..... 6/8 lr 40 x 12 h   . PHARMAC DVT PPLX .    .... 7/3/2025 apixa 2.5 x 2 started by hospitalist   .... 6/22 apixa dced suspect hemoptysis   .... 6/20 apixa 2.5 x 2   .... 6/12-6/14/2025 iv ufh (hospitalist)  .... 6/11/2025 [facility] 5k.2   .... 6/10/2025 4:46 PM iv ufh   .... [facility] 6/9-6/10/2025   . NON PHARMAC DVT PPLX .    .... 6/23/2025 compr device   . STRESS ULCR PPLX .   .... PROTONIX 40 6/22/2025   . DATE/DM MGMT.   ..... See under Endocrine section   GENERAL DATA .   . COVID.         .... scv2 6/8/2025 scv2 (-)   . GOC.    ....    . ICU STAY.   .... 6/28/2025   .... 6/22 - 6/25   .... 6/8-6/9   . INFECTION PPLX .   .... CHLORHEXIDINE 2% 6/8/2025   . ALLGY.   ....  nka  . WT.   .... 6/8/2025 69   . BMI.  ....6/8/2025 26    [id number]   SUMMARY BASELINE .     84 yr old female pt of Dr Gallegos not on home ox or home cpap used to use trelegy lives with spouse quit 40 y 1/2 ppd with PMHx afib on eliquis, COPD (not on home O2), PE/Rt lower extremity DVT 2017, Rt LE IVC filter 2017 CKD3, aplastic anemia, polymyalgia rheumatica on prednisone 5 mg po qd, requiring PRBC transfusions around 8 weeks (via Rt subclavian mediport), AVN bilat hips, HTN, HLD, HFpEF (75% 6.2.25), diastolic dysfx grade1, recent hospitalization 5/25/25-6/5/25 for ADHF/COPD exacerbation, Pt with eventual improvement and transfer to [facility] facility   CC.   . 6/8/2025 R CHEST PAIN   MAIN ISSUES.  . COPD 6/23/2025 hydrocort 50.3 6/27/2025 pred 10   . HYPERCAPNIC RESP FAILURE: 6/20/2025 4p 5l 736/71/65 6/23/2025 2a avaps 450/18/8/25/10 .45 740/67/85   .... 6/20/2025 Noct bpap 15/5/35/16 ordrd 6/23 noct avaps .45 14 epap 8 vt 450 25/10   . RESP FAILURE 6/22/2025  HFNC 6/22 5p HFNC 80% 50l po 95% tr icu   . MILD HEMOPTYSOIS 6/10/2025 6/22 apixa stoppd   . PNEUMONIA RML 6/8/2025:  ZOSYN 6/8-6/16/2025   . PLEURITIS CHEST PAIN 6/10/2025: 6/10 vte excluded vlp vq   . SHOCK 6/8/2025: tr oo icu 6/9   . NOREPI 6/8-6/9/2025   . STRESS STEROIDS : HYDROCORTISONE 6/8-6/17/2025  . A fib (chronic) POA 6/8/20256/22/2025 6/22 apixa stopped   . CHF: tte 6/2/2025  ef 71%  pasp 54  la mod dilatd 6/22 lasix 40 -> 7/1 torsemide 20  20   . ANEMIA Hb 6/8-6/13-6/25/2025 Hb 7.5- 8.4- 8.5  . TRANSFUSION 6/8  1 u prbc  6/22 1 u prbc   . KIMBERLY ON CKD Cr 6/8-6/13/2025 Cr 1.9- 1.1  . HYPERNATREMIA 6/24-6/25/2025 Na 147-146     . PNEUMONIA: 6/22 ct bl ul opac 6/22-6/29/2025 zosyn   . SUSPECTED BLEEDING IN [place] CAUSED RESP DECOMPENSATION AND NEW OPACITIES ON 6/22 CT  6/22 622 apixa stoppd     . RESP FAILURE 6/28/2025 LIKELY DUE TO NONCOMPLIANCE WITH NOCT AVAP Tr to ICU 6/28/2025 AVAPS 30% 24   . COPD HYDROCORT 6/29/2025 h 50.3 7/3 h 25  . APIXABA a 2.5 x 2 started 7/3/2025 by hospitalist   . KIMBERLY ON CKD 6/28/2025 Cr 1.4    . HYPOPHOSPHATEMIA 6/28/2025 PO4 .7     PMH.   . AFon Eliquis,   . COPD (not on home o2),   . CKD,   . aplastic anemia,   . TRANSFUSION 6/2/2025 1 u prbc   . PMR (chronic prednisone with AVN hip),   . HLD,   . HTN,   . DM    PROCEDURES/DEVICES .  . 6/16/2025 FOB br wash rul ooze rul l lo lobe post basal     PAST PROCEDURES   . r mediport poa 6/8/2025     DISCUSSIONS.  .... Discussed with primary care and relevant consultants on an ongoing basis       TIME SPENT.  . Over 36 minutes aggregate care time spent on encounter; activities included   direct patient care, counseling and/or coordinating care reviewing notes, lab data/ imaging , discussion with multidisciplinary team/ patient  /family and explaining in detail risks, benefits, alternatives  of the recommendations     ROBERT HENRIQUEZ 84 6/8/2025 1942

## 2025-07-04 NOTE — SOCIAL WORK PROGRESS NOTE - NSSWPROGRESSNOTE_GEN_ALL_CORE
Pt will be discharged today at 11am to [facility] at [address], [place] via [facility].  As per SW notes patients daughter is aware and is in agreement and will be at bedside for .  D/C summary will be sent to [facility] once completed.  SW messaged Dr Maldonado to remind her to include bipap settings.

## 2025-07-04 NOTE — PROGRESS NOTE ADULT - PROVIDER SPECIALTY LIST ADULT
Cardiology
Critical Care
Heme/Onc
Heme/Onc
Infectious Disease
Nephrology
Pulmonology
Cardiology
Heme/Onc
Hospitalist
Infectious Disease
Nephrology
Pulmonology
Cardiology
Critical Care
Critical Care
Heme/Onc
Hospitalist
Infectious Disease
Nephrology
Pulmonology
Cardiology
Critical Care
Heme/Onc
Heme/Onc
Infectious Disease
Infectious Disease
Nephrology
Nephrology
Cardiology
Heme/Onc
Hospitalist
Hospitalist
Heme/Onc
Heme/Onc
Hospitalist
Cardiology
Endocrinology
Hospitalist
Hospitalist
Endocrinology
Hospitalist

## 2025-07-04 NOTE — PROGRESS NOTE ADULT - SUBJECTIVE AND OBJECTIVE BOX
Patient is a 84y old  Female who presents with a chief complaint of septic shock, PNA (03 Jul 2025 16:55)        INTERVAL HPI/OVERNIGHT EVENTS:    MEDICATIONS  (STANDING):  albuterol/ipratropium for Nebulization 3 milliLiter(s) Nebulizer three times a day  aMIOdarone    Tablet 100 milliGRAM(s) Oral daily  apixaban 2.5 milliGRAM(s) Oral every 12 hours  chlorhexidine 2% Cloths 1 Application(s) Topical <User Schedule>  dextrose 5%. 1000 milliLiter(s) (50 mL/Hr) IV Continuous <Continuous>  dextrose 5%. 1000 milliLiter(s) (100 mL/Hr) IV Continuous <Continuous>  dextrose 50% Injectable 12.5 Gram(s) IV Push once  dextrose 50% Injectable 25 Gram(s) IV Push once  dextrose 50% Injectable 25 Gram(s) IV Push once  fluticasone propionate/ salmeterol 500-50 MICROgram(s) Diskus 1 Dose(s) Inhalation two times a day  glucagon  Injectable 1 milliGRAM(s) IntraMuscular once  hydrocortisone sodium succinate Injectable 25 milliGRAM(s) IV Push daily  insulin lispro (ADMELOG) corrective regimen sliding scale   SubCutaneous three times a day before meals  insulin lispro (ADMELOG) corrective regimen sliding scale   SubCutaneous at bedtime  melatonin 5 milliGRAM(s) Oral at bedtime  pantoprazole  Injectable 40 milliGRAM(s) IV Push daily  sodium chloride 3%  Inhalation 4 milliLiter(s) Inhalation three times a day  torsemide 20 milliGRAM(s) Oral daily    MEDICATIONS  (PRN):  acetaminophen     Tablet .. 650 milliGRAM(s) Oral every 6 hours PRN Mild Pain (1 - 3)  dextrose Oral Gel 15 Gram(s) Oral once PRN Blood Glucose LESS THAN 70 milliGRAM(s)/deciliter      Allergies    No Known Allergies    Intolerances          Vital Signs Last 24 Hrs  T(C): 37 (04 Jul 2025 05:00), Max: 37 (04 Jul 2025 05:00)  T(F): 98.6 (04 Jul 2025 05:00), Max: 98.6 (04 Jul 2025 05:00)  HR: 78 (04 Jul 2025 05:00) (73 - 82)  BP: 125/75 (04 Jul 2025 05:00) (124/76 - 129/67)  BP(mean): --  RR: 18 (04 Jul 2025 05:00) (18 - 20)  SpO2: 98% (04 Jul 2025 05:00) (94% - 100%)    Parameters below as of 04 Jul 2025 05:00  Patient On (Oxygen Delivery Method): nasal cannula        General: WN/WD NAD  Respiratory: CTA B/L  CV: RRR, S1S2, no murmurs, rubs or gallops  Abdominal: Soft, NT, ND +BS, Last BM  Extremities: No edema, + peripheral pulses    LABS:                        8.5    3.15  )-----------( 115      ( 03 Jul 2025 07:36 )             27.4     07-03    144  |  98  |  24[H]  ----------------------------<  101[H]  3.2[L]   |  41[H]  |  0.52    Ca    7.9[L]      03 Jul 2025 07:36    TPro  6.0  /  Alb  3.1[L]  /  TBili  0.4  /  DBili  x   /  AST  27  /  ALT  42  /  AlkPhos  74  07-03    CAPILLARY BLOOD GLUCOSE      POCT Blood Glucose.: 161 mg/dL (03 Jul 2025 22:05)  POCT Blood Glucose.: 103 mg/dL (03 Jul 2025 16:54)  POCT Blood Glucose.: 122 mg/dL (03 Jul 2025 12:23)  POCT Blood Glucose.: 125 mg/dL (03 Jul 2025 08:15)    Urinalysis Basic - ( 03 Jul 2025 07:36 )    Color: x / Appearance: x / SG: x / pH: x  Gluc: 101 mg/dL / Ketone: x  / Bili: x / Urobili: x   Blood: x / Protein: x / Nitrite: x   Leuk Esterase: x / RBC: x / WBC x   Sq Epi: x / Non Sq Epi: x / Bacteria: x          RADIOLOGY & ADDITIONAL TESTS:

## 2025-07-04 NOTE — PROGRESS NOTE ADULT - NS ATTEND AMEND GEN_ALL_CORE FT
84 female PMH of A-fib on Eliquis, COPD, CKD, aplastic anemia, polymyalgia rheumatica, on chronic steroids, avascular necrosis of hips, HLD, HTN, DM, recent admission to [place] 5/26 through 6/5/2025 for CHF/fluid overload/COPD exacerbation, presents to the ED form [facility] for evaluation of fever and generalized feeling of being unwell, found to be septic and hypotensive.     have been treating for pna, hypoxia  6/22 altered ms fro mco2 narcosis, was not wearing bipap  was in icu over the weekend with hypoxic resp failure and hypotension now improved  cont nc o2, on 3L NC this AM, wean as tolerated   encourage compliance with nocturnal bipap    paf on ac, now sr, cont amiodarone and eliquis. Eliquis dose reduced in the setting of her previous hemoptysis   Was on IV lasix. Torsemide 20mg resumed this AM. Would give additional 40mg IV lasix x 1 this AM.
84 female PMH of A-fib on Eliquis, COPD, CKD, aplastic anemia, polymyalgia rheumatica, on chronic steroids, avascular necrosis of hips, HLD, HTN, DM, recent admission to [place] 5/26 through 6/5/2025 for CHF/fluid overload/COPD exacerbation, presents to the ED form [facility] for evaluation of fever and generalized feeling of being unwell, found to be septic and hypotensive.       - With symptomatic anemia.   - s/p PRBC's for Hgb 7.  No clear evidence of bleeding  - Back on Heparin gtt for now (being given in lieu of home Eliquis).  Heme following for MDS  - Transfuse per Primary  - CxR shows a focal consolidation in the right lung.  Abx per Primary  - Has baseline dysautonomia with resultant severe orthostasis.    - s/p IV resuscitation and IV pressor  - Continue Midodrine and Northera  - Would hold her diuretic therapy at this time given that she appears dry on exam.  - Wean O2; satting  on 1L NC  - Now back on Heparin gtt.  To switch to Eliquis eventually  - off tele   - Continue home Amiodarone 100 mg daily  - Will need bronch on Monday to eval hemoptysis.  No cardiac contraindication to proceeding
84 female PMH of A-fib on Eliquis, COPD, CKD, aplastic anemia, polymyalgia rheumatica, on chronic steroids, avascular necrosis of hips, HLD, HTN, DM, recent admission to [place] 5/26 through 6/5/2025 for CHF/fluid overload/COPD exacerbation, presents to the ED form [facility] for evaluation of fever and generalized feeling of being unwell, found to be septic and hypotensive.       - hx copd on home o2 , baseline o2 keep > 88%.  Downtitrate as needed.  On 4L, reduced to 3L  - S/p bronch, 6/16.  Showed blood oozing from RUL but no lesion.  No further hemoptysis  - 6/22 had AMS from CO2 narcosis.   - s/p BIPAP.  Compliance with cpap recommended  - Pls, resume home Eliquis once cleared by Pulm  - NSR with short PAT's on tele.  Can D/C  - Continue Amiodarone  - Can switch IV Lasix to home Torsemide   - Now off Midodrine and Northera
84 female PMH of A-fib on Eliquis, COPD, CKD, aplastic anemia, polymyalgia rheumatica, on chronic steroids, avascular necrosis of hips, HLD, HTN, DM, recent admission to [place] 5/26 through 6/5/2025 for CHF/fluid overload/COPD exacerbation, presents to the ED form [facility] for evaluation of fever and generalized feeling of being unwell, found to be septic and hypotensive.       - s/p bronch, 6/16.  Showed blood oozing from RUL but no lesion.  Still with minimal hemoptysis  - On NC at 4L/min, satting well.  Downtitrate to keep Sat 92%  - c/o atypical CP overnight and this morning.  Appears pleuritic  - EKG x 2 showed NSR with PAC, non-ischemic.  Troponin negative   - Back on home statin for Hx HLD  - Back on home Torsemide 20 mg daily.  Would give extra dose tonight  - discussed with heme.  TO resume Eliquis 2.5 bid.   - Continue Amiodarone 100 mg daily
84 female PMH of A-fib on Eliquis, COPD, CKD, aplastic anemia, polymyalgia rheumatica, on chronic steroids, avascular necrosis of hips, HLD, HTN, DM, recent admission to [place] 5/26 through 6/5/2025 for CHF/fluid overload/COPD exacerbation, presents to the ED form [facility] for evaluation of fever and generalized feeling of being unwell, found to be septic and hypotensive.     baseline o2-dep copd  adm with pna  s/p bronch for hemoptysis, oozing from rul but no lesion found  had been on 1e but developed hypothermia, hypotension, unresponsiveness and hypercarbic resp failure  she had not been wearing her bipap, and has been told that this is likely to result in recurrent episodes of severe hypercarbic resp failure  s/p avaps with improvement    normal lvef with lvh and mod-sev pulm htn, no need to repeat at this time  has had degree of vol ol at times, now appears euvolemic, though pocus in the icu suggests degree of vol ol given incr ivc  had been on torsemide, now on lasix 40m IV qday. Would transition back to torsemide on 7/2  Please continue to maintain strict I/Os, monitor daily weights, Cr, and K.   can cont diuretics for now if creat stable    bp had been stable off Midodrine and Northera, and off steroids, noting addl hx of dysautonomia  on stress dose steroids   pressors as appropriate, now dcd  today complaining of lightheadedness when getting from bed to chair  check orthostatics and resume midodrine if necessary    paf and vte was on ac, now off 2/2 hemoptysis   to remains off for now as per pulm given recurrent bleeding     episodes pat on tele, cont amio  will follow with you
84 female PMH of A-fib on Eliquis, COPD, CKD, aplastic anemia, polymyalgia rheumatica, on chronic steroids, avascular necrosis of hips, HLD, HTN, DM, recent admission to [place] 5/26 through 6/5/2025 for CHF/fluid overload/COPD exacerbation, presents to the ED form [facility] for evaluation of fever and generalized feeling of being unwell, found to be septic and hypotensive.     - CT chest 6/10 with multifocal R sided PNA  - Pulm following.    - hx copd on home o2 , baseline o2 keep > 88%   - s/p bronch, 6/16.  Showed blood oozing from RUL but no lesion.  Still with minimal hemoptysis. Eliquis remains on hold.   - fu ID     - on 6/22 had AMS from CO2 narcosis.   - now of of bipap  - compliance with cpap recommended  - monitor ABGs closely   - Hold IV lasix, hypernatremia noted. Will resume home Torsemide once BMP results in AM    - c/o atypical CP Appears pleuritic, denies any further episodes.   - EKG x 2 showed NSR with PAC, non-ischemic.  Troponin negative     - Continue IV Lasix to maintain neg balance  - Echo 6/12/25 as above EF 67% mild LVH, mod MAC, mild MR, mod to severe pHTN    - Bp remains stable    - Now off Midodrine and Northera  - known hx of dysautonomia monitor bps closely    - Hx of paroxysmal atrial fibrillation and DVT/PE  - restarted on ac with eliquis at 2.5 bid, but now off 2/2 hemoptysis.   - resume when ok with pulm   - Would place on low dose BB if BPs allow  - Monitor and replete electrolytes. Keep K>4.0 and Mg>2.0.  - Continue Amiodarone 100 mg daily    - Will continue to follow.  Patient is at risk for abrupt decompensation
84 female PMH of A-fib on Eliquis, COPD, CKD, aplastic anemia, polymyalgia rheumatica, on chronic steroids, avascular necrosis of hips, HLD, HTN, DM, recent admission to [place] 5/26 through 6/5/2025 for CHF/fluid overload/COPD exacerbation, presents to the ED form [facility] for evaluation of fever and generalized feeling of being unwell, found to be septic and hypotensive.     - s/p bronch, 6/16.  Showed blood oozing from RUL but no lesion.  Still with minimal hemoptysis  - more short of breath this am (6/22) with worsening hypoxia  - need repeat abg, cxr and pulm fu  - bipap qhs, and now for hf02  - does appear volume overloaded, though back on torsemide. If HCO3 trend unfavorable, can try diamox.  - ac resumed, monitor hb  - Continue Amiodarone 100 mg daily.  - will follow with you.
84 female PMH of A-fib on Eliquis, COPD, CKD, aplastic anemia, polymyalgia rheumatica, on chronic steroids, avascular necrosis of hips, HLD, HTN, DM, recent admission to [place] 5/26 through 6/5/2025 for CHF/fluid overload/COPD exacerbation, presents to the ED form [facility] for evaluation of fever and generalized feeling of being unwell, found to be septic and hypotensive.     - With symptomatic anemia.   - s/p PRBC's for Hgb 7.     - Eliquis on hold  -  Patient now having significant hemoptysis, observed on our interview.  Will stop heparin gtt and continue to hold Eliquis for now.    - Heme following for MDS  - Transfuse per Primary  -For bronch by pulm 6/16.  NO cardiac contraindication to proceeding  - Has baseline dysautonomia with resultant severe orthostasis.    - s/p IV resuscitation and IV pressor  - Continue Midodrine and Northera  - Would hold her diuretic therapy at this time given that she appears dry on exam.  - Wean O2; satting  on 1L NC  - Continue home Amiodarone 100 mg daily
84 female PMH of A-fib on Eliquis, COPD, CKD, aplastic anemia, polymyalgia rheumatica, on chronic steroids, avascular necrosis of hips, HLD, HTN, DM, recent admission to [place] 5/26 through 6/5/2025 for CHF/fluid overload/COPD exacerbation, presents to the ED form [facility] for evaluation of fever and generalized feeling of being unwell, found to be septic and hypotensive.     - s/p bronch, 6/16.  Showed blood oozing from RUL but no lesion.  Still with minimal hemoptysis  - On NC at 4L/min, satting well.  Downtitrate to keep Sat 92%  - pleuritic cp has resolved  - EKG x 2 showed NSR with PAC, non-ischemic.  Troponin negative   - Back on home statin for Hx HLD  - Back on home Torsemide 20 mg daily. Does appear volume up, though given cr, na and hc03 trend, will not give iv dosing  - ac resumed, monitor hb  - Continue Amiodarone 100 mg daily.  - will follow with you.
84 female PMH of A-fib on Eliquis, COPD, CKD, aplastic anemia, polymyalgia rheumatica, on chronic steroids, avascular necrosis of hips, HLD, HTN, DM, recent admission to [place] 5/26 through 6/5/2025 for CHF/fluid overload/COPD exacerbation, presents to the ED form [facility] for evaluation of fever and generalized feeling of being unwell, found to be septic and hypotensive.       baseline o2-dep copd  adm with pna  s/p bronch for hemoptysis, oozing from rul but no lesion found  has had degree of vol ol at times, now appears euvolemic  normal lvef with lvh and mod-sev pulm htn  cont torsemide  no acute ischemia   bp remains stable off Midodrine and Northera  known hx of dysautonomia monitor bps    paf and vte was on ac, now off 2/2 hemoptysis   to remains off for now as per pulm given recurrent bleeding   episodes pat, cont amio and bb
84 female PMH of A-fib on Eliquis, COPD, CKD, aplastic anemia, polymyalgia rheumatica, on chronic steroids, avascular necrosis of hips, HLD, HTN, DM, recent admission to [place] 5/26 through 6/5/2025 for CHF/fluid overload/COPD exacerbation, presents to the ED form [facility] for evaluation of fever and generalized feeling of being unwell, found to be septic and hypotensive.     - CT chest 6/10 with multifocal R sided PNA  - Pulm following.    - hx copd on home o2 , baseline o2 keep > 88%   - s/p bronch, 6/16.  Showed blood oozing from RUL but no lesion.  Still with minimal hemoptysis. Eliquis remains on hold.     - on 6/22 had AMS from CO2 narcosis.   - now off of bipap  - compliance with cpap recommended  - monitor ABGs closely   - Hold IV lasix, hypernatremia noted. Will resume home Torsemide once BMP results in AM    - c/o atypical CP Appears pleuritic, denies any further episodes.   - EKG x 2 showed NSR with PAC, non-ischemic.  Troponin negative     - Continue IV Lasix to maintain neg balance  - Echo 6/12/25 as above EF 67% mild LVH, mod MAC, mild MR, mod to severe pHTN    - Bp remains stable    - Now off Midodrine and Northera  - known hx of dysautonomia monitor bps closely    - Hx of paroxysmal atrial fibrillation and DVT/PE  - was restarted on ac with eliquis at 2.5 bid, but now off 2/2 hemoptysis.   - resume when ok with pulm   - Would place on low dose BB if BPs allow  - Monitor and replete electrolytes. Keep K>4.0 and Mg>2.0.  - Continue Amiodarone 100 mg daily    - Will continue to follow.
84 female PMH of A-fib on Eliquis, COPD, CKD, aplastic anemia, polymyalgia rheumatica, on chronic steroids, avascular necrosis of hips, HLD, HTN, DM, recent admission to [place] 5/26 through 6/5/2025 for CHF/fluid overload/COPD exacerbation, presents to the ED form [facility] for evaluation of fever and generalized feeling of being unwell, found to be septic and hypotensive.     have been treating for pna, hypoxia  6/22 altered ms fro mco2 narcosis, was not wearing bipap  was in icu over the weekend with hypoxic resp failure and hypotension now improved  cont nc o2  encourage compliance with nocturnal bipap    paf on ac, now sr, cont amio  can change to torsemide po from iv lasix
84 female PMH of A-fib on Eliquis, COPD, CKD, aplastic anemia, polymyalgia rheumatica, on chronic steroids, avascular necrosis of hips, HLD, HTN, DM, recent admission to [place] 5/26 through 6/5/2025 for CHF/fluid overload/COPD exacerbation, presents to the ED form [facility] for evaluation of fever and generalized feeling of being unwell, found to be septic and hypotensive.     was recently admitted with hf req iv lasix  now presents with pleuritic cp in setting of pna, infiltrate on cxr   no acute ischemia  bp soft cont mido/nothera  has a baseline dysautonomia with resultant severe orthostasis.     sr cont amio.  heparin gtt on hold in setting of blood loss anemia  trend hb closely
84 female PMH of A-fib on Eliquis, COPD, CKD, aplastic anemia, polymyalgia rheumatica, on chronic steroids, avascular necrosis of hips, HLD, HTN, DM, recent admission to [place] 5/26 through 6/5/2025 for CHF/fluid overload/COPD exacerbation, presents to the ED form [facility] for evaluation of fever and generalized feeling of being unwell, found to be septic and hypotensive.       baseline o2-dep copd  adm with pna  s/p bronch for hemoptysis, oozing from rul but no lesion found  had been on 1e but developed hypothermia, hypotension, unresponsiveness and hypercarbic resp failure  she had not been wearing her bipap, and has been told that this is likely to result in recurrent episodes of severe hypercarbic resp failure  s/p avaps with improvement    normal lvef with lvh and mod-sev pulm htn, no need to repeat at this time  has had degree of vol ol at times, now appears euvolemic, though pocus in the icu suggests degree of vol ol given incr ivc  had been on torsemide, now on lasix 40m IV qday  Please continue to maintain strict I/Os, monitor daily weights, Cr, and K.   can cont diuretics for now if creat stable    bp had been stable off Midodrine and Northera, and off steroids, noting addl hx of dysautonomia  on stress dose steroids   pressors as appropriate, now dcd  today complaining of lightheadedness when getting from bed to chair  check orthostatics  resume midodrine if necessary    paf and vte was on ac, now off 2/2 hemoptysis   to remains off for now as per pulm given recurrent bleeding     episodes pat on tele cont amio and bb.
84 female PMH of A-fib on Eliquis, COPD, CKD, aplastic anemia, polymyalgia rheumatica, on chronic steroids, avascular necrosis of hips, HLD, HTN, DM, recent admission to [place] 5/26 through 6/5/2025 for CHF/fluid overload/COPD exacerbation, presents to the ED form [facility] for evaluation of fever and generalized feeling of being unwell, found to be septic and hypotensive.     - s/p ICU and pressor  - CT chest 6/10 with multifocal R sided PNA  - s/p bronch, 6/16.  Showed blood oozing from RUL but no lesion  - On NC at 4L/min but c/o SOB, increased by Resident to 5L.  Downtitrate to keep Sat 92%  - s/p ABX    - no ischemia  - Appears compensated from HF POV.   - Torsemide 20 mg daily.    - Echo 6/12/25 as above EF 67% mild LVH, mod MAC, mild MR, mod to severe pHTN    - Now off Midodrine and Northera  - has chronic orthostasis. need to monitor this closely. would check orthostatics routinely    - Hx of paroxysmal atrial fibrillation and DVT/PE  - Continue to hold home Eliquis/heparin gtt for persistent hemoptysis (from Afib standpoint)  - Continue Amiodarone 100 mg daily  - If hematemesis it has not improved would challenge her with full dose anticoagulation again and monitor for worsening bleeding
84 female PMH of A-fib on Eliquis, COPD, CKD, aplastic anemia, polymyalgia rheumatica, on chronic steroids, avascular necrosis of hips, HLD, HTN, DM, recent admission to [place] 5/26 through 6/5/2025 for CHF/fluid overload/COPD exacerbation, presents to the ED form [facility] for evaluation of fever and generalized feeling of being unwell, found to be septic and hypotensive.     Admitted to ICU for septic shock likely 2/2 to PNA and KIMBERLY.  planning bronch today, optimized for planned low risk procedure   no acute ischemia, or vol ol  known paf has been on ac, presently held for hemoptysis  Now s/p bronch 6/16 with blood noted in the RUL but no lesion  Resume home amio  normal ef tte 6/12.  Appears slightly volume up this AM, would obtain BMP, given lasix 40mg IV x 1 and resume home Torsemide thereafter
84 female PMH of A-fib on Eliquis, COPD, CKD, aplastic anemia, polymyalgia rheumatica, on chronic steroids, avascular necrosis of hips, HLD, HTN, DM, recent admission to [place] 5/26 through 6/5/2025 for CHF/fluid overload/COPD exacerbation, presents to the ED form [facility] for evaluation of fever and generalized feeling of being unwell, found to be septic and hypotensive.     - With symptomatic anemia.   - s/p PRBC's for Hgb 7.  No clear evidence of bleeding  - Back on Heparin gtt for now (being given in lieu of home Eliquis).  Heme following for MDS  - Transfuse to keep hb >8    - CP likely in the setting of significant anemia or pleuritic.  Trops x 2 neg  - CxR shows a focal consolidation in the right lung.  Abx per Primary  - Initiate incentive spirometer    - Has baseline dysautonomia with resultant severe orthostasis.    - s/p IV resuscitation and IV pressor  - Continue Midodrine and Northera    - ECHO 6/2/25:  LVEF 71%, LVH, and mod pHTN.   - defer diuretics     - Hx of paroxysmal atrial fibrillation and DVT/PE now sr  - Now back on Heparin gtt.  To switch to Eliquis eventually  - Continue home Amiodarone 100 mg daily
84 female PMH of A-fib on Eliquis, COPD, CKD, aplastic anemia, polymyalgia rheumatica, on chronic steroids, avascular necrosis of hips, HLD, HTN, DM, recent admission to [place] 5/26 through 6/5/2025 for CHF/fluid overload/COPD exacerbation, presents to the ED form [facility] for evaluation of fever and generalized feeling of being unwell, found to be septic and hypotensive.     was recently admitted with hf req iv lasix  now presents with pleuritic cp in setting of pna, infiltrate on cxr   no acute ischemia  bp soft cont mido  has a baseline dysautonomia with resultant severe orthostasis.     sr cont amio
84 female PMH of A-fib on Eliquis, COPD, CKD, aplastic anemia, polymyalgia rheumatica, on chronic steroids, avascular necrosis of hips, HLD, HTN, DM, recent admission to [place] 5/26 through 6/5/2025 for CHF/fluid overload/COPD exacerbation, presents to the ED form [facility] for evaluation of fever and generalized feeling of being unwell, found to be septic and hypotensive.     - s/p bronch, 6/16.  Showed blood oozing from RUL but no lesion.  Still with minimal hemoptysis  - On NC at 4L/min, satting well.  Downtitrated to keep Sat 92%  - c/o atypical CP overnight.  Appears pleuritic  - EKG x 2 showed NSR with PAC, non-ischemic.  Troponin negative   - Back on home statin for Hx HLD  - Back on home Torsemide 20 mg daily.   - resume ac today, now that counts stable  - Continue Amiodarone 100 mg daily.  - will follow with you
84 female PMH of A-fib on Eliquis, COPD, CKD, aplastic anemia, polymyalgia rheumatica, on chronic steroids, avascular necrosis of hips, HLD, HTN, DM, recent admission to [place] 5/26 through 6/5/2025 for CHF/fluid overload/COPD exacerbation, presents to the ED form [facility] for evaluation of fever and generalized feeling of being unwell, found to be septic and hypotensive.     - Admitted with symptomatic anemia.   - s/p PRBC's for Hgb 7.     - Eliquis on hold  -  Heparin gtt held for hemoptysis for now   Heme following for MDS  -For bronch by pulm 6/16  - Continue Midodrine and Northera  - Would hold her diuretic therapy at this time given that she appears dry on exam.  - Continue home Amiodarone 100 mg daily  - Optimized for the bronch  from a cardiac point of view with no evidence of active ischemic heart disease, decompensated heart failure, severe obstructive valvular disease, or uncontrolled arrhythmia.
84 female PMH of A-fib on Eliquis, COPD, CKD, aplastic anemia, polymyalgia rheumatica, on chronic steroids, avascular necrosis of hips, HLD, HTN, DM, recent admission to [place] 5/26 through 6/5/2025 for CHF/fluid overload/COPD exacerbation, presents to the ED form [facility] for evaluation of fever and generalized feeling of being unwell, found to be septic and hypotensive.     Admitted to ICU for septic shock likely 2/2 to PNA and KIMBERLY.  planning bronch today, optimized for planned low risk procedure   no acute ischemia, or vol ol  known paf has been on ac, presently held for hemoptysis  cont amio  normal ef tte 6/12

## 2025-07-04 NOTE — PROGRESS NOTE ADULT - SUBJECTIVE AND OBJECTIVE BOX
CHIEF COMPLAINT/ REASON FOR VISIT  .. Patient was seen to address the  issue listed under PROBLEM LIST which is located toward bottom of this note     MARTINEZ PATEL TELN 89 D1    Allergies    No Known Allergies    Intolerances        PAST MEDICAL & SURGICAL HISTORY:  COPD (chronic obstructive pulmonary disease)      DM (diabetes mellitus)  diet-controlled      PAF (paroxysmal atrial fibrillation)  s/p ablation      Hiatal hernia      H/O aplastic anemia      History of IBS      H/O osteoporosis      HLD (hyperlipidemia)      PMR (polymyalgia rheumatica)      History of basal cell cancer      Chronic kidney disease (CKD)      Hypotension      Presence of IVC filter      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      History of immunodeficiency      Lumbar compression fracture      H/O hiatal hernia      H/O pulmonary fibrosis      H/O sinus bradycardia      History of fracture of right hip  "unoperable"      S/P hysterectomy      S/P foot surgery      S/P knee surgery      After cataract  bilateral eye cataract surgically removed      Closed right hip fracture  rigth hip ORIF july 19 2016      S/P IVC filter  nov 2016      S/P carpal tunnel release  Right 2008 & 6/27/17      H/O kyphoplasty      Port-A-Cath in place  right chest          FAMILY HISTORY:  Family history of bladder cancer (Mother)    Family history of myocardial infarction (Father)    FH: atrial fibrillation (Father)        Home Medications:  amiodarone 200 mg oral tablet: 0.5 tab(s) orally once a day (08 Jun 2025 16:43)  Bentyl 10 mg oral capsule: 1 cap(s) orally 2 times a day, As Needed (08 Jun 2025 16:43)  Eliquis 2.5 mg oral tablet: 1 tab(s) orally 2 times a day (08 Jun 2025 16:43)  fluticasone-salmeterol 500 mcg-50 mcg/inh inhalation powder: 1 puff(s) inhaled 2 times a day (08 Jun 2025 16:43)  folic acid 1 mg oral tablet: 1 tab(s) orally once a day (in the morning) (08 Jun 2025 16:43)  gabapentin 400 mg oral capsule: 1 cap(s) orally 2 times a day (08 Jun 2025 16:43)  ipratropium-albuterol 0.5 mg-2.5 mg/3 mL inhalation solution: 3 milliliter(s) inhaled every 6 hours As needed Shortness of Breath and/or Wheezing (08 Jun 2025 16:43)  IVIG monthly:  (08 Jun 2025 16:43)  leflunomide 10 mg oral tablet: 1 tab(s) orally once a day (08 Jun 2025 16:43)  montelukast 10 mg oral tablet: 1 tab(s) orally once a day (08 Jun 2025 16:43)  pantoprazole 40 mg oral delayed release tablet: 1 tab(s) orally once a day (before a meal) (08 Jun 2025 16:43)  predniSONE 5 mg oral tablet: 1 tab(s) orally once a day Please start taking 5mg from 7/22 back to your home dose (03 Jul 2025 13:16)  Procrit 10,000 units/mL preservative-free injectable solution: injectable once a week WEDNESDAY (08 Jun 2025 16:43)  Reclast Infusion , IV x 1 yearly:  (08 Jun 2025 16:43)  simvastatin 10 mg oral tablet: 1 tab(s) orally once a day (at bedtime) (08 Jun 2025 16:43)  tiotropium 2.5 mcg/inh inhalation aerosol: 2 puff(s) inhaled once a day (08 Jun 2025 16:43)  tiZANidine: 2 tab(s) orally once a day (at bedtime) as needed for  muscle spasm (08 Jun 2025 16:43)  torsemide 20 mg oral tablet: 1 tab(s) orally once a day (in the morning) (08 Jun 2025 16:49)      MEDICATIONS  (STANDING):  albuterol/ipratropium for Nebulization 3 milliLiter(s) Nebulizer three times a day  aMIOdarone    Tablet 100 milliGRAM(s) Oral daily  apixaban 2.5 milliGRAM(s) Oral every 12 hours  chlorhexidine 2% Cloths 1 Application(s) Topical <User Schedule>  dextrose 5%. 1000 milliLiter(s) (50 mL/Hr) IV Continuous <Continuous>  dextrose 5%. 1000 milliLiter(s) (100 mL/Hr) IV Continuous <Continuous>  dextrose 50% Injectable 12.5 Gram(s) IV Push once  dextrose 50% Injectable 25 Gram(s) IV Push once  dextrose 50% Injectable 25 Gram(s) IV Push once  fluticasone propionate/ salmeterol 500-50 MICROgram(s) Diskus 1 Dose(s) Inhalation two times a day  glucagon  Injectable 1 milliGRAM(s) IntraMuscular once  hydrocortisone sodium succinate Injectable 25 milliGRAM(s) IV Push daily  insulin lispro (ADMELOG) corrective regimen sliding scale   SubCutaneous three times a day before meals  insulin lispro (ADMELOG) corrective regimen sliding scale   SubCutaneous at bedtime  magnesium oxide 400 milliGRAM(s) Oral once  melatonin 5 milliGRAM(s) Oral at bedtime  pantoprazole  Injectable 40 milliGRAM(s) IV Push daily  potassium phosphate / sodium phosphate Tablet (K-PHOS No. 2) 1 Tablet(s) Oral once  sodium chloride 3%  Inhalation 4 milliLiter(s) Inhalation three times a day  torsemide 20 milliGRAM(s) Oral daily    MEDICATIONS  (PRN):  acetaminophen     Tablet .. 650 milliGRAM(s) Oral every 6 hours PRN Mild Pain (1 - 3)  dextrose Oral Gel 15 Gram(s) Oral once PRN Blood Glucose LESS THAN 70 milliGRAM(s)/deciliter      Diet, Regular:   DASH/TLC Sodium & Cholesterol Restricted (06-23-25 @ 13:52) [Active]          Vital Signs Last 24 Hrs  T(C): 37 (04 Jul 2025 05:00), Max: 37 (04 Jul 2025 05:00)  T(F): 98.6 (04 Jul 2025 05:00), Max: 98.6 (04 Jul 2025 05:00)  HR: 78 (04 Jul 2025 05:00) (74 - 82)  BP: 125/75 (04 Jul 2025 05:00) (124/76 - 129/67)  BP(mean): --  RR: 18 (04 Jul 2025 05:00) (18 - 20)  SpO2: 98% (04 Jul 2025 05:00) (94% - 99%)    Parameters below as of 04 Jul 2025 05:00  Patient On (Oxygen Delivery Method): nasal cannula                  LABS:                        8.5    3.15  )-----------( 115      ( 03 Jul 2025 07:36 )             27.4     07-04    142  |  101  |  20  ----------------------------<  83  4.5   |  41[H]  |  0.73    Ca    8.0[L]      04 Jul 2025 06:41  Phos  2.1     07-04  Mg     1.4     07-04    TPro  5.4[L]  /  Alb  2.9[L]  /  TBili  0.4  /  DBili  x   /  AST  17  /  ALT  38  /  AlkPhos  58  07-04      Urinalysis Basic - ( 04 Jul 2025 06:41 )    Color: x / Appearance: x / SG: x / pH: x  Gluc: 83 mg/dL / Ketone: x  / Bili: x / Urobili: x   Blood: x / Protein: x / Nitrite: x   Leuk Esterase: x / RBC: x / WBC x   Sq Epi: x / Non Sq Epi: x / Bacteria: x            WBC:  WBC Count: 3.15 K/uL (07-03 @ 07:36)  WBC Count: 2.37 K/uL (07-02 @ 06:55)  WBC Count: 2.50 K/uL (07-01 @ 06:30)      MICROBIOLOGY:  RECENT CULTURES:  06-28 Urine XXXX XXXX   <10,000 CFU/mL Normal Urogenital Ginger                    Sodium:  Sodium: 142 mmol/L (07-04 @ 06:41)  Sodium: 144 mmol/L (07-03 @ 07:36)  Sodium: 144 mmol/L (07-02 @ 06:55)  Sodium: 145 mmol/L (07-01 @ 06:30)      0.73 mg/dL 07-04 @ 06:41  0.52 mg/dL 07-03 @ 07:36  0.92 mg/dL 07-02 @ 06:55  0.80 mg/dL 07-01 @ 06:30      Hemoglobin:  Hemoglobin: 8.5 g/dL (07-03 @ 07:36)  Hemoglobin: 8.0 g/dL (07-02 @ 06:55)  Hemoglobin: 8.3 g/dL (07-01 @ 06:30)      Platelets: Platelet Count - Automated: 115 K/uL (07-03 @ 07:36)  Platelet Count - Automated: 106 K/uL (07-02 @ 06:55)  Platelet Count - Automated: 187 K/uL (07-01 @ 06:30)      LIVER FUNCTIONS - ( 04 Jul 2025 06:41 )  Alb: 2.9 g/dL / Pro: 5.4 g/dL / ALK PHOS: 58 U/L / ALT: 38 U/L / AST: 17 U/L / GGT: x             Urinalysis Basic - ( 04 Jul 2025 06:41 )    Color: x / Appearance: x / SG: x / pH: x  Gluc: 83 mg/dL / Ketone: x  / Bili: x / Urobili: x   Blood: x / Protein: x / Nitrite: x   Leuk Esterase: x / RBC: x / WBC x   Sq Epi: x / Non Sq Epi: x / Bacteria: x        RADIOLOGY & ADDITIONAL STUDIES:      MICROBIOLOGY:  RECENT CULTURES:  06-28 Urine XXXX XXXX   <10,000 CFU/mL Normal Urogenital Ginger

## 2025-07-11 ENCOUNTER — APPOINTMENT (OUTPATIENT)
Dept: CARDIOLOGY | Facility: CLINIC | Age: 85
End: 2025-07-11

## 2025-07-11 RX ORDER — IRON SUCROSE 20 MG/ML
100 INJECTION, SOLUTION INTRAVENOUS
Refills: 0 | DISCHARGE
Start: 2025-07-11 | End: 2025-07-15

## 2025-07-16 ENCOUNTER — INPATIENT (INPATIENT)
Facility: HOSPITAL | Age: 85
LOS: 4 days | Discharge: NURSING, REHAB OR LONG-TERM CARE | DRG: 812 | End: 2025-07-21
Attending: STUDENT IN AN ORGANIZED HEALTH CARE EDUCATION/TRAINING PROGRAM | Admitting: INTERNAL MEDICINE
Payer: MEDICARE

## 2025-07-16 VITALS
SYSTOLIC BLOOD PRESSURE: 150 MMHG | TEMPERATURE: 98 F | DIASTOLIC BLOOD PRESSURE: 54 MMHG | HEART RATE: 85 BPM | WEIGHT: 154.1 LBS | OXYGEN SATURATION: 95 % | HEIGHT: 64 IN | RESPIRATION RATE: 26 BRPM

## 2025-07-16 DIAGNOSIS — Z98.89 OTHER SPECIFIED POSTPROCEDURAL STATES: Chronic | ICD-10-CM

## 2025-07-16 DIAGNOSIS — E78.5 HYPERLIPIDEMIA, UNSPECIFIED: ICD-10-CM

## 2025-07-16 DIAGNOSIS — S72.001A FRACTURE OF UNSPECIFIED PART OF NECK OF RIGHT FEMUR, INITIAL ENCOUNTER FOR CLOSED FRACTURE: Chronic | ICD-10-CM

## 2025-07-16 DIAGNOSIS — I50.9 HEART FAILURE, UNSPECIFIED: ICD-10-CM

## 2025-07-16 DIAGNOSIS — Z98.890 OTHER SPECIFIED POSTPROCEDURAL STATES: Chronic | ICD-10-CM

## 2025-07-16 DIAGNOSIS — N18.9 CHRONIC KIDNEY DISEASE, UNSPECIFIED: ICD-10-CM

## 2025-07-16 DIAGNOSIS — Z90.710 ACQUIRED ABSENCE OF BOTH CERVIX AND UTERUS: Chronic | ICD-10-CM

## 2025-07-16 DIAGNOSIS — J44.1 CHRONIC OBSTRUCTIVE PULMONARY DISEASE WITH (ACUTE) EXACERBATION: ICD-10-CM

## 2025-07-16 DIAGNOSIS — Z95.828 PRESENCE OF OTHER VASCULAR IMPLANTS AND GRAFTS: Chronic | ICD-10-CM

## 2025-07-16 DIAGNOSIS — I10 ESSENTIAL (PRIMARY) HYPERTENSION: ICD-10-CM

## 2025-07-16 DIAGNOSIS — Z29.9 ENCOUNTER FOR PROPHYLACTIC MEASURES, UNSPECIFIED: ICD-10-CM

## 2025-07-16 DIAGNOSIS — H26.40 UNSPECIFIED SECONDARY CATARACT: Chronic | ICD-10-CM

## 2025-07-16 DIAGNOSIS — D64.9 ANEMIA, UNSPECIFIED: ICD-10-CM

## 2025-07-16 DIAGNOSIS — G90.9 DISORDER OF THE AUTONOMIC NERVOUS SYSTEM, UNSPECIFIED: ICD-10-CM

## 2025-07-16 DIAGNOSIS — M35.3 POLYMYALGIA RHEUMATICA: ICD-10-CM

## 2025-07-16 DIAGNOSIS — I48.91 UNSPECIFIED ATRIAL FIBRILLATION: ICD-10-CM

## 2025-07-16 LAB
ALBUMIN SERPL ELPH-MCNC: 3.3 G/DL — SIGNIFICANT CHANGE UP (ref 3.3–5)
ALP SERPL-CCNC: 58 U/L — SIGNIFICANT CHANGE UP (ref 40–120)
ALT FLD-CCNC: 23 U/L — SIGNIFICANT CHANGE UP (ref 12–78)
ANION GAP SERPL CALC-SCNC: 3 MMOL/L — LOW (ref 5–17)
APTT BLD: 25 SEC — LOW (ref 26.1–36.8)
AST SERPL-CCNC: 6 U/L — LOW (ref 15–37)
BASOPHILS # BLD AUTO: 0.01 K/UL — SIGNIFICANT CHANGE UP (ref 0–0.2)
BASOPHILS NFR BLD AUTO: 0.1 % — SIGNIFICANT CHANGE UP (ref 0–2)
BILIRUB SERPL-MCNC: 0.5 MG/DL — SIGNIFICANT CHANGE UP (ref 0.2–1.2)
BUN SERPL-MCNC: 32 MG/DL — HIGH (ref 7–23)
CALCIUM SERPL-MCNC: 8.9 MG/DL — SIGNIFICANT CHANGE UP (ref 8.5–10.1)
CHLORIDE SERPL-SCNC: 99 MMOL/L — SIGNIFICANT CHANGE UP (ref 96–108)
CO2 SERPL-SCNC: 35 MMOL/L — HIGH (ref 22–31)
CREAT SERPL-MCNC: 1 MG/DL — SIGNIFICANT CHANGE UP (ref 0.5–1.3)
CULTURE RESULTS: SIGNIFICANT CHANGE UP
EGFR: 56 ML/MIN/1.73M2 — LOW
EGFR: 56 ML/MIN/1.73M2 — LOW
EOSINOPHIL # BLD AUTO: 0.01 K/UL — SIGNIFICANT CHANGE UP (ref 0–0.5)
EOSINOPHIL NFR BLD AUTO: 0.1 % — SIGNIFICANT CHANGE UP (ref 0–6)
FLUAV AG NPH QL: SIGNIFICANT CHANGE UP
FLUBV AG NPH QL: SIGNIFICANT CHANGE UP
GLUCOSE SERPL-MCNC: 103 MG/DL — HIGH (ref 70–99)
HCT VFR BLD CALC: 19.3 % — CRITICAL LOW (ref 34.5–45)
HGB BLD-MCNC: 6.2 G/DL — CRITICAL LOW (ref 11.5–15.5)
IMM GRANULOCYTES # BLD AUTO: 0.23 K/UL — HIGH (ref 0–0.07)
IMM GRANULOCYTES NFR BLD AUTO: 2.6 % — HIGH (ref 0–0.9)
INR BLD: 1.47 RATIO — HIGH (ref 0.85–1.16)
LYMPHOCYTES # BLD AUTO: 0.39 K/UL — LOW (ref 1–3.3)
LYMPHOCYTES NFR BLD AUTO: 4.4 % — LOW (ref 13–44)
MCHC RBC-ENTMCNC: 29.4 PG — SIGNIFICANT CHANGE UP (ref 27–34)
MCHC RBC-ENTMCNC: 32.1 G/DL — SIGNIFICANT CHANGE UP (ref 32–36)
MCV RBC AUTO: 91.5 FL — SIGNIFICANT CHANGE UP (ref 80–100)
MONOCYTES # BLD AUTO: 1.31 K/UL — HIGH (ref 0–0.9)
MONOCYTES NFR BLD AUTO: 14.7 % — HIGH (ref 2–14)
NEUTROPHILS # BLD AUTO: 6.94 K/UL — SIGNIFICANT CHANGE UP (ref 1.8–7.4)
NEUTROPHILS NFR BLD AUTO: 78.1 % — HIGH (ref 43–77)
NRBC # BLD AUTO: 0.03 K/UL — HIGH (ref 0–0)
NRBC # FLD: 0.03 K/UL — HIGH (ref 0–0)
NRBC BLD AUTO-RTO: 0 /100 WBCS — SIGNIFICANT CHANGE UP (ref 0–0)
NT-PROBNP SERPL-SCNC: 1115 PG/ML — HIGH (ref 0–450)
PLATELET # BLD AUTO: SIGNIFICANT CHANGE UP K/UL (ref 150–400)
PMV BLD: 12.1 FL — SIGNIFICANT CHANGE UP (ref 7–13)
POTASSIUM SERPL-MCNC: 4.3 MMOL/L — SIGNIFICANT CHANGE UP (ref 3.5–5.3)
POTASSIUM SERPL-SCNC: 4.3 MMOL/L — SIGNIFICANT CHANGE UP (ref 3.5–5.3)
PROT SERPL-MCNC: 5.9 G/DL — LOW (ref 6–8.3)
PROTHROM AB SERPL-ACNC: 17.3 SEC — HIGH (ref 9.9–13.4)
RBC # BLD: 2.11 M/UL — LOW (ref 3.8–5.2)
RBC # FLD: 16.2 % — HIGH (ref 10.3–14.5)
RSV RNA NPH QL NAA+NON-PROBE: SIGNIFICANT CHANGE UP
SARS-COV-2 RNA SPEC QL NAA+PROBE: SIGNIFICANT CHANGE UP
SODIUM SERPL-SCNC: 137 MMOL/L — SIGNIFICANT CHANGE UP (ref 135–145)
SOURCE RESPIRATORY: SIGNIFICANT CHANGE UP
SPECIMEN SOURCE: SIGNIFICANT CHANGE UP
TROPONIN I, HIGH SENSITIVITY RESULT: 15.7 NG/L — SIGNIFICANT CHANGE UP
WBC # BLD: 8.89 K/UL — SIGNIFICANT CHANGE UP (ref 3.8–10.5)
WBC # FLD AUTO: 8.89 K/UL — SIGNIFICANT CHANGE UP (ref 3.8–10.5)

## 2025-07-16 PROCEDURE — 85610 PROTHROMBIN TIME: CPT

## 2025-07-16 PROCEDURE — 93005 ELECTROCARDIOGRAM TRACING: CPT

## 2025-07-16 PROCEDURE — 71045 X-RAY EXAM CHEST 1 VIEW: CPT | Mod: 26

## 2025-07-16 PROCEDURE — 84484 ASSAY OF TROPONIN QUANT: CPT

## 2025-07-16 PROCEDURE — 71045 X-RAY EXAM CHEST 1 VIEW: CPT

## 2025-07-16 PROCEDURE — 36415 COLL VENOUS BLD VENIPUNCTURE: CPT

## 2025-07-16 PROCEDURE — 86850 RBC ANTIBODY SCREEN: CPT

## 2025-07-16 PROCEDURE — 94660 CPAP INITIATION&MGMT: CPT

## 2025-07-16 PROCEDURE — 87637 SARSCOV2&INF A&B&RSV AMP PRB: CPT

## 2025-07-16 PROCEDURE — 85025 COMPLETE CBC W/AUTO DIFF WBC: CPT

## 2025-07-16 PROCEDURE — 93010 ELECTROCARDIOGRAM REPORT: CPT

## 2025-07-16 PROCEDURE — 99291 CRITICAL CARE FIRST HOUR: CPT

## 2025-07-16 PROCEDURE — 99223 1ST HOSP IP/OBS HIGH 75: CPT | Mod: GC

## 2025-07-16 PROCEDURE — 86901 BLOOD TYPING SEROLOGIC RH(D): CPT

## 2025-07-16 PROCEDURE — 85730 THROMBOPLASTIN TIME PARTIAL: CPT

## 2025-07-16 PROCEDURE — 94640 AIRWAY INHALATION TREATMENT: CPT

## 2025-07-16 PROCEDURE — 86900 BLOOD TYPING SEROLOGIC ABO: CPT

## 2025-07-16 PROCEDURE — 86923 COMPATIBILITY TEST ELECTRIC: CPT

## 2025-07-16 PROCEDURE — 80053 COMPREHEN METABOLIC PANEL: CPT

## 2025-07-16 PROCEDURE — 99223 1ST HOSP IP/OBS HIGH 75: CPT

## 2025-07-16 PROCEDURE — 83880 ASSAY OF NATRIURETIC PEPTIDE: CPT

## 2025-07-16 RX ORDER — MAGNESIUM OXIDE 400 MG
1 TABLET ORAL
Refills: 0 | DISCHARGE

## 2025-07-16 RX ORDER — TIZANIDINE 4 MG/1
2 TABLET ORAL
Refills: 0 | DISCHARGE

## 2025-07-16 RX ORDER — SENNA 187 MG
2 TABLET ORAL
Refills: 0 | DISCHARGE

## 2025-07-16 RX ORDER — EPOETIN ALFA 10000 [IU]/ML
1 SOLUTION INTRAVENOUS; SUBCUTANEOUS
Refills: 0 | DISCHARGE

## 2025-07-16 RX ORDER — MIDODRINE HYDROCHLORIDE 5 MG/1
1 TABLET ORAL
Refills: 0 | DISCHARGE

## 2025-07-16 RX ORDER — TIOTROPIUM BROMIDE INHALATION SPRAY 3.12 UG/1
1 SPRAY, METERED RESPIRATORY (INHALATION)
Refills: 0 | DISCHARGE

## 2025-07-16 RX ORDER — LEFLUNOMIDE 10 MG/1
10 TABLET ORAL DAILY
Refills: 0 | Status: DISCONTINUED | OUTPATIENT
Start: 2025-07-16 | End: 2025-07-21

## 2025-07-16 RX ORDER — PREDNISONE 20 MG/1
7.5 TABLET ORAL DAILY
Refills: 0 | Status: DISCONTINUED | OUTPATIENT
Start: 2025-07-16 | End: 2025-07-21

## 2025-07-16 RX ORDER — MONTELUKAST SODIUM 10 MG/1
1 TABLET ORAL
Refills: 0 | DISCHARGE

## 2025-07-16 RX ORDER — ACETAMINOPHEN 500 MG/5ML
2 LIQUID (ML) ORAL
Refills: 0 | DISCHARGE

## 2025-07-16 RX ORDER — AMIODARONE HYDROCHLORIDE 50 MG/ML
1 INJECTION, SOLUTION INTRAVENOUS
Refills: 0 | DISCHARGE

## 2025-07-16 RX ORDER — EPOETIN ALFA 10000 [IU]/ML
10000 SOLUTION INTRAVENOUS; SUBCUTANEOUS
Refills: 0 | Status: DISCONTINUED | OUTPATIENT
Start: 2025-07-16 | End: 2025-07-17

## 2025-07-16 RX ORDER — AMIODARONE HYDROCHLORIDE 50 MG/ML
100 INJECTION, SOLUTION INTRAVENOUS DAILY
Refills: 0 | Status: DISCONTINUED | OUTPATIENT
Start: 2025-07-16 | End: 2025-07-21

## 2025-07-16 RX ORDER — TIZANIDINE 4 MG/1
4 TABLET ORAL AT BEDTIME
Refills: 0 | Status: DISCONTINUED | OUTPATIENT
Start: 2025-07-16 | End: 2025-07-21

## 2025-07-16 RX ORDER — FUROSEMIDE 10 MG/ML
20 INJECTION INTRAMUSCULAR; INTRAVENOUS ONCE
Refills: 0 | Status: COMPLETED | OUTPATIENT
Start: 2025-07-16 | End: 2025-07-16

## 2025-07-16 RX ORDER — OMEPRAZOLE 20 MG/1
1 CAPSULE, DELAYED RELEASE ORAL
Refills: 0 | DISCHARGE

## 2025-07-16 RX ORDER — ACETAMINOPHEN 500 MG/5ML
650 LIQUID (ML) ORAL EVERY 6 HOURS
Refills: 0 | Status: DISCONTINUED | OUTPATIENT
Start: 2025-07-16 | End: 2025-07-21

## 2025-07-16 RX ORDER — FUROSEMIDE 10 MG/ML
40 INJECTION INTRAMUSCULAR; INTRAVENOUS
Refills: 0 | Status: DISCONTINUED | OUTPATIENT
Start: 2025-07-17 | End: 2025-07-20

## 2025-07-16 RX ORDER — APIXABAN 5 MG/1
1 TABLET, FILM COATED ORAL
Refills: 0 | DISCHARGE

## 2025-07-16 RX ORDER — ACETAMINOPHEN 500 MG/5ML
1000 LIQUID (ML) ORAL ONCE
Refills: 0 | Status: COMPLETED | OUTPATIENT
Start: 2025-07-16 | End: 2025-07-16

## 2025-07-16 RX ORDER — ATORVASTATIN CALCIUM 80 MG/1
10 TABLET, FILM COATED ORAL AT BEDTIME
Refills: 0 | Status: DISCONTINUED | OUTPATIENT
Start: 2025-07-16 | End: 2025-07-21

## 2025-07-16 RX ORDER — MELATONIN 5 MG
3 TABLET ORAL AT BEDTIME
Refills: 0 | Status: DISCONTINUED | OUTPATIENT
Start: 2025-07-16 | End: 2025-07-21

## 2025-07-16 RX ORDER — METHYLPREDNISOLONE ACETATE 80 MG/ML
125 INJECTION, SUSPENSION INTRA-ARTICULAR; INTRALESIONAL; INTRAMUSCULAR; SOFT TISSUE ONCE
Refills: 0 | Status: COMPLETED | OUTPATIENT
Start: 2025-07-16 | End: 2025-07-16

## 2025-07-16 RX ORDER — FOLIC ACID 1 MG/1
1 TABLET ORAL
Refills: 0 | DISCHARGE

## 2025-07-16 RX ORDER — TIOTROPIUM BROMIDE INHALATION SPRAY 3.12 UG/1
2 SPRAY, METERED RESPIRATORY (INHALATION) DAILY
Refills: 0 | Status: DISCONTINUED | OUTPATIENT
Start: 2025-07-16 | End: 2025-07-21

## 2025-07-16 RX ORDER — ALBUTEROL SULFATE 2.5 MG/3ML
2 VIAL, NEBULIZER (ML) INHALATION EVERY 6 HOURS
Refills: 0 | Status: DISCONTINUED | OUTPATIENT
Start: 2025-07-16 | End: 2025-07-16

## 2025-07-16 RX ORDER — PREDNISONE 20 MG/1
TABLET ORAL
Refills: 0 | Status: DISCONTINUED | OUTPATIENT
Start: 2025-07-16 | End: 2025-07-21

## 2025-07-16 RX ORDER — MIDODRINE HYDROCHLORIDE 5 MG/1
5 TABLET ORAL
Refills: 0 | Status: DISCONTINUED | OUTPATIENT
Start: 2025-07-16 | End: 2025-07-21

## 2025-07-16 RX ORDER — MAGNESIUM, ALUMINUM HYDROXIDE 200-200 MG
30 TABLET,CHEWABLE ORAL EVERY 4 HOURS
Refills: 0 | Status: DISCONTINUED | OUTPATIENT
Start: 2025-07-16 | End: 2025-07-21

## 2025-07-16 RX ORDER — IPRATROPIUM BROMIDE AND ALBUTEROL SULFATE .5; 2.5 MG/3ML; MG/3ML
3 SOLUTION RESPIRATORY (INHALATION) ONCE
Refills: 0 | Status: COMPLETED | OUTPATIENT
Start: 2025-07-16 | End: 2025-07-16

## 2025-07-16 RX ORDER — GABAPENTIN 400 MG/1
400 CAPSULE ORAL
Refills: 0 | Status: DISCONTINUED | OUTPATIENT
Start: 2025-07-16 | End: 2025-07-21

## 2025-07-16 RX ORDER — FOLIC ACID 1 MG/1
1 TABLET ORAL DAILY
Refills: 0 | Status: DISCONTINUED | OUTPATIENT
Start: 2025-07-16 | End: 2025-07-21

## 2025-07-16 RX ORDER — IPRATROPIUM BROMIDE AND ALBUTEROL SULFATE .5; 2.5 MG/3ML; MG/3ML
3 SOLUTION RESPIRATORY (INHALATION) EVERY 4 HOURS
Refills: 0 | Status: DISCONTINUED | OUTPATIENT
Start: 2025-07-16 | End: 2025-07-21

## 2025-07-16 RX ORDER — IPRATROPIUM BROMIDE AND ALBUTEROL SULFATE .5; 2.5 MG/3ML; MG/3ML
3 SOLUTION RESPIRATORY (INHALATION) EVERY 6 HOURS
Refills: 0 | Status: DISCONTINUED | OUTPATIENT
Start: 2025-07-16 | End: 2025-07-16

## 2025-07-16 RX ORDER — MIDODRINE HYDROCHLORIDE 5 MG/1
10 TABLET ORAL
Refills: 0 | Status: DISCONTINUED | OUTPATIENT
Start: 2025-07-16 | End: 2025-07-21

## 2025-07-16 RX ADMIN — METHYLPREDNISOLONE ACETATE 125 MILLIGRAM(S): 80 INJECTION, SUSPENSION INTRA-ARTICULAR; INTRALESIONAL; INTRAMUSCULAR; SOFT TISSUE at 13:22

## 2025-07-16 RX ADMIN — IPRATROPIUM BROMIDE AND ALBUTEROL SULFATE 3 MILLILITER(S): .5; 2.5 SOLUTION RESPIRATORY (INHALATION) at 13:18

## 2025-07-16 RX ADMIN — Medication 2 PUFF(S): at 18:34

## 2025-07-16 RX ADMIN — ATORVASTATIN CALCIUM 10 MILLIGRAM(S): 80 TABLET, FILM COATED ORAL at 22:18

## 2025-07-16 RX ADMIN — Medication 1 DOSE(S): at 22:19

## 2025-07-16 RX ADMIN — TIZANIDINE 4 MILLIGRAM(S): 4 TABLET ORAL at 22:18

## 2025-07-16 RX ADMIN — FUROSEMIDE 20 MILLIGRAM(S): 10 INJECTION INTRAMUSCULAR; INTRAVENOUS at 13:18

## 2025-07-16 RX ADMIN — GABAPENTIN 400 MILLIGRAM(S): 400 CAPSULE ORAL at 18:34

## 2025-07-16 RX ADMIN — IPRATROPIUM BROMIDE AND ALBUTEROL SULFATE 3 MILLILITER(S): .5; 2.5 SOLUTION RESPIRATORY (INHALATION) at 22:59

## 2025-07-16 RX ADMIN — IPRATROPIUM BROMIDE AND ALBUTEROL SULFATE 3 MILLILITER(S): .5; 2.5 SOLUTION RESPIRATORY (INHALATION) at 18:34

## 2025-07-16 RX ADMIN — Medication 400 MILLIGRAM(S): at 13:07

## 2025-07-17 DIAGNOSIS — Z71.89 OTHER SPECIFIED COUNSELING: ICD-10-CM

## 2025-07-17 DIAGNOSIS — D46.Z OTHER MYELODYSPLASTIC SYNDROMES: ICD-10-CM

## 2025-07-17 DIAGNOSIS — Z51.5 ENCOUNTER FOR PALLIATIVE CARE: ICD-10-CM

## 2025-07-17 LAB
ALBUMIN SERPL ELPH-MCNC: 3.2 G/DL — LOW (ref 3.3–5)
ALP SERPL-CCNC: 53 U/L — SIGNIFICANT CHANGE UP (ref 40–120)
ALT FLD-CCNC: 18 U/L — SIGNIFICANT CHANGE UP (ref 12–78)
ANION GAP SERPL CALC-SCNC: 7 MMOL/L — SIGNIFICANT CHANGE UP (ref 5–17)
AST SERPL-CCNC: 6 U/L — LOW (ref 15–37)
BASOPHILS # BLD AUTO: 0 K/UL — SIGNIFICANT CHANGE UP (ref 0–0.2)
BASOPHILS NFR BLD AUTO: 0 % — SIGNIFICANT CHANGE UP (ref 0–2)
BILIRUB SERPL-MCNC: 0.6 MG/DL — SIGNIFICANT CHANGE UP (ref 0.2–1.2)
BUN SERPL-MCNC: 37 MG/DL — HIGH (ref 7–23)
CALCIUM SERPL-MCNC: 8.9 MG/DL — SIGNIFICANT CHANGE UP (ref 8.5–10.1)
CHLORIDE SERPL-SCNC: 98 MMOL/L — SIGNIFICANT CHANGE UP (ref 96–108)
CO2 SERPL-SCNC: 34 MMOL/L — HIGH (ref 22–31)
CREAT SERPL-MCNC: 1.2 MG/DL — SIGNIFICANT CHANGE UP (ref 0.5–1.3)
EGFR: 45 ML/MIN/1.73M2 — LOW
EGFR: 45 ML/MIN/1.73M2 — LOW
EOSINOPHIL # BLD AUTO: 0 K/UL — SIGNIFICANT CHANGE UP (ref 0–0.5)
EOSINOPHIL NFR BLD AUTO: 0 % — SIGNIFICANT CHANGE UP (ref 0–6)
GLUCOSE BLDC GLUCOMTR-MCNC: 159 MG/DL — HIGH (ref 70–99)
GLUCOSE SERPL-MCNC: 150 MG/DL — HIGH (ref 70–99)
HCT VFR BLD CALC: 21.4 % — LOW (ref 34.5–45)
HGB BLD-MCNC: 7.1 G/DL — LOW (ref 11.5–15.5)
IMM GRANULOCYTES # BLD AUTO: 0.03 K/UL — SIGNIFICANT CHANGE UP (ref 0–0.07)
IMM GRANULOCYTES NFR BLD AUTO: 0.9 % — SIGNIFICANT CHANGE UP (ref 0–0.9)
INR BLD: 1.25 RATIO — HIGH (ref 0.85–1.16)
LYMPHOCYTES # BLD AUTO: 0.22 K/UL — LOW (ref 1–3.3)
LYMPHOCYTES NFR BLD AUTO: 6.7 % — LOW (ref 13–44)
MCHC RBC-ENTMCNC: 29.6 PG — SIGNIFICANT CHANGE UP (ref 27–34)
MCHC RBC-ENTMCNC: 33.2 G/DL — SIGNIFICANT CHANGE UP (ref 32–36)
MCV RBC AUTO: 89.2 FL — SIGNIFICANT CHANGE UP (ref 80–100)
MONOCYTES # BLD AUTO: 0.44 K/UL — SIGNIFICANT CHANGE UP (ref 0–0.9)
MONOCYTES NFR BLD AUTO: 13.5 % — SIGNIFICANT CHANGE UP (ref 2–14)
NEUTROPHILS # BLD AUTO: 2.58 K/UL — SIGNIFICANT CHANGE UP (ref 1.8–7.4)
NEUTROPHILS NFR BLD AUTO: 78.9 % — HIGH (ref 43–77)
NRBC # BLD AUTO: 0.04 K/UL — HIGH (ref 0–0)
NRBC # FLD: 0.04 K/UL — HIGH (ref 0–0)
NRBC BLD AUTO-RTO: 1 /100 WBCS — HIGH (ref 0–0)
PLATELET # BLD AUTO: 248 K/UL — SIGNIFICANT CHANGE UP (ref 150–400)
PMV BLD: 11.2 FL — SIGNIFICANT CHANGE UP (ref 7–13)
POTASSIUM SERPL-MCNC: 4.5 MMOL/L — SIGNIFICANT CHANGE UP (ref 3.5–5.3)
POTASSIUM SERPL-MCNC: 5.5 MMOL/L — HIGH (ref 3.5–5.3)
POTASSIUM SERPL-SCNC: 4.5 MMOL/L — SIGNIFICANT CHANGE UP (ref 3.5–5.3)
POTASSIUM SERPL-SCNC: 5.5 MMOL/L — HIGH (ref 3.5–5.3)
PROT SERPL-MCNC: 5.8 G/DL — LOW (ref 6–8.3)
PROTHROM AB SERPL-ACNC: 14.6 SEC — HIGH (ref 9.9–13.4)
RBC # BLD: 2.4 M/UL — LOW (ref 3.8–5.2)
RBC # FLD: 15.9 % — HIGH (ref 10.3–14.5)
SODIUM SERPL-SCNC: 139 MMOL/L — SIGNIFICANT CHANGE UP (ref 135–145)
WBC # BLD: 3.27 K/UL — LOW (ref 3.8–10.5)
WBC # FLD AUTO: 3.27 K/UL — LOW (ref 3.8–10.5)

## 2025-07-17 PROCEDURE — 86850 RBC ANTIBODY SCREEN: CPT

## 2025-07-17 PROCEDURE — 99222 1ST HOSP IP/OBS MODERATE 55: CPT

## 2025-07-17 PROCEDURE — 94640 AIRWAY INHALATION TREATMENT: CPT

## 2025-07-17 PROCEDURE — 84132 ASSAY OF SERUM POTASSIUM: CPT

## 2025-07-17 PROCEDURE — 87637 SARSCOV2&INF A&B&RSV AMP PRB: CPT

## 2025-07-17 PROCEDURE — 82962 GLUCOSE BLOOD TEST: CPT

## 2025-07-17 PROCEDURE — 83880 ASSAY OF NATRIURETIC PEPTIDE: CPT

## 2025-07-17 PROCEDURE — 94760 N-INVAS EAR/PLS OXIMETRY 1: CPT

## 2025-07-17 PROCEDURE — 99233 SBSQ HOSP IP/OBS HIGH 50: CPT | Mod: GC

## 2025-07-17 PROCEDURE — P9040: CPT

## 2025-07-17 PROCEDURE — 85025 COMPLETE CBC W/AUTO DIFF WBC: CPT

## 2025-07-17 PROCEDURE — 85610 PROTHROMBIN TIME: CPT

## 2025-07-17 PROCEDURE — 86923 COMPATIBILITY TEST ELECTRIC: CPT

## 2025-07-17 PROCEDURE — 99233 SBSQ HOSP IP/OBS HIGH 50: CPT

## 2025-07-17 PROCEDURE — 93005 ELECTROCARDIOGRAM TRACING: CPT

## 2025-07-17 PROCEDURE — 84484 ASSAY OF TROPONIN QUANT: CPT

## 2025-07-17 PROCEDURE — 85730 THROMBOPLASTIN TIME PARTIAL: CPT

## 2025-07-17 PROCEDURE — 86900 BLOOD TYPING SEROLOGIC ABO: CPT

## 2025-07-17 PROCEDURE — 71045 X-RAY EXAM CHEST 1 VIEW: CPT

## 2025-07-17 PROCEDURE — 86901 BLOOD TYPING SEROLOGIC RH(D): CPT

## 2025-07-17 PROCEDURE — 97162 PT EVAL MOD COMPLEX 30 MIN: CPT

## 2025-07-17 PROCEDURE — 94660 CPAP INITIATION&MGMT: CPT

## 2025-07-17 PROCEDURE — 80053 COMPREHEN METABOLIC PANEL: CPT

## 2025-07-17 PROCEDURE — 36415 COLL VENOUS BLD VENIPUNCTURE: CPT

## 2025-07-17 RX ORDER — EPOETIN ALFA 10000 [IU]/ML
10000 SOLUTION INTRAVENOUS; SUBCUTANEOUS
Refills: 0 | Status: DISCONTINUED | OUTPATIENT
Start: 2025-07-17 | End: 2025-07-21

## 2025-07-17 RX ADMIN — GABAPENTIN 400 MILLIGRAM(S): 400 CAPSULE ORAL at 18:38

## 2025-07-17 RX ADMIN — IPRATROPIUM BROMIDE AND ALBUTEROL SULFATE 3 MILLILITER(S): .5; 2.5 SOLUTION RESPIRATORY (INHALATION) at 10:40

## 2025-07-17 RX ADMIN — Medication 40 MILLIGRAM(S): at 05:25

## 2025-07-17 RX ADMIN — GABAPENTIN 400 MILLIGRAM(S): 400 CAPSULE ORAL at 05:17

## 2025-07-17 RX ADMIN — FUROSEMIDE 40 MILLIGRAM(S): 10 INJECTION INTRAMUSCULAR; INTRAVENOUS at 18:43

## 2025-07-17 RX ADMIN — TIOTROPIUM BROMIDE INHALATION SPRAY 2 PUFF(S): 3.12 SPRAY, METERED RESPIRATORY (INHALATION) at 05:23

## 2025-07-17 RX ADMIN — MIDODRINE HYDROCHLORIDE 5 MILLIGRAM(S): 5 TABLET ORAL at 18:43

## 2025-07-17 RX ADMIN — TIZANIDINE 4 MILLIGRAM(S): 4 TABLET ORAL at 22:09

## 2025-07-17 RX ADMIN — IPRATROPIUM BROMIDE AND ALBUTEROL SULFATE 3 MILLILITER(S): .5; 2.5 SOLUTION RESPIRATORY (INHALATION) at 02:42

## 2025-07-17 RX ADMIN — LEFLUNOMIDE 10 MILLIGRAM(S): 10 TABLET ORAL at 11:03

## 2025-07-17 RX ADMIN — FUROSEMIDE 40 MILLIGRAM(S): 10 INJECTION INTRAMUSCULAR; INTRAVENOUS at 05:17

## 2025-07-17 RX ADMIN — EPOETIN ALFA 10000 UNIT(S): 10000 SOLUTION INTRAVENOUS; SUBCUTANEOUS at 14:26

## 2025-07-17 RX ADMIN — IPRATROPIUM BROMIDE AND ALBUTEROL SULFATE 3 MILLILITER(S): .5; 2.5 SOLUTION RESPIRATORY (INHALATION) at 07:11

## 2025-07-17 RX ADMIN — Medication 1 DOSE(S): at 18:44

## 2025-07-17 RX ADMIN — IPRATROPIUM BROMIDE AND ALBUTEROL SULFATE 3 MILLILITER(S): .5; 2.5 SOLUTION RESPIRATORY (INHALATION) at 23:27

## 2025-07-17 RX ADMIN — ATORVASTATIN CALCIUM 10 MILLIGRAM(S): 80 TABLET, FILM COATED ORAL at 22:09

## 2025-07-17 RX ADMIN — FOLIC ACID 1 MILLIGRAM(S): 1 TABLET ORAL at 11:02

## 2025-07-17 RX ADMIN — AMIODARONE HYDROCHLORIDE 100 MILLIGRAM(S): 50 INJECTION, SOLUTION INTRAVENOUS at 05:17

## 2025-07-17 RX ADMIN — Medication 1 DOSE(S): at 05:24

## 2025-07-17 RX ADMIN — PREDNISONE 7.5 MILLIGRAM(S): 20 TABLET ORAL at 05:17

## 2025-07-17 RX ADMIN — IPRATROPIUM BROMIDE AND ALBUTEROL SULFATE 3 MILLILITER(S): .5; 2.5 SOLUTION RESPIRATORY (INHALATION) at 20:15

## 2025-07-17 RX ADMIN — IPRATROPIUM BROMIDE AND ALBUTEROL SULFATE 3 MILLILITER(S): .5; 2.5 SOLUTION RESPIRATORY (INHALATION) at 14:52

## 2025-07-17 RX ADMIN — MIDODRINE HYDROCHLORIDE 10 MILLIGRAM(S): 5 TABLET ORAL at 05:16

## 2025-07-18 LAB
ALBUMIN SERPL ELPH-MCNC: 3.1 G/DL — LOW (ref 3.3–5)
ALP SERPL-CCNC: 49 U/L — SIGNIFICANT CHANGE UP (ref 40–120)
ALT FLD-CCNC: 17 U/L — SIGNIFICANT CHANGE UP (ref 12–78)
ANION GAP SERPL CALC-SCNC: 7 MMOL/L — SIGNIFICANT CHANGE UP (ref 5–17)
AST SERPL-CCNC: 7 U/L — LOW (ref 15–37)
BASOPHILS # BLD AUTO: 0 K/UL — SIGNIFICANT CHANGE UP (ref 0–0.2)
BASOPHILS NFR BLD AUTO: 0 % — SIGNIFICANT CHANGE UP (ref 0–2)
BILIRUB SERPL-MCNC: 0.6 MG/DL — SIGNIFICANT CHANGE UP (ref 0.2–1.2)
BUN SERPL-MCNC: 45 MG/DL — HIGH (ref 7–23)
CALCIUM SERPL-MCNC: 8.7 MG/DL — SIGNIFICANT CHANGE UP (ref 8.5–10.1)
CHLORIDE SERPL-SCNC: 99 MMOL/L — SIGNIFICANT CHANGE UP (ref 96–108)
CO2 SERPL-SCNC: 35 MMOL/L — HIGH (ref 22–31)
CREAT SERPL-MCNC: 0.96 MG/DL — SIGNIFICANT CHANGE UP (ref 0.5–1.3)
EGFR: 59 ML/MIN/1.73M2 — LOW
EGFR: 59 ML/MIN/1.73M2 — LOW
EOSINOPHIL # BLD AUTO: 0 K/UL — SIGNIFICANT CHANGE UP (ref 0–0.5)
EOSINOPHIL NFR BLD AUTO: 0 % — SIGNIFICANT CHANGE UP (ref 0–6)
GLUCOSE SERPL-MCNC: 90 MG/DL — SIGNIFICANT CHANGE UP (ref 70–99)
HCT VFR BLD CALC: 23 % — LOW (ref 34.5–45)
HCT VFR BLD CALC: 23.2 % — LOW (ref 34.5–45)
HGB BLD-MCNC: 7.4 G/DL — LOW (ref 11.5–15.5)
HGB BLD-MCNC: 7.8 G/DL — LOW (ref 11.5–15.5)
IMM GRANULOCYTES # BLD AUTO: 0.04 K/UL — SIGNIFICANT CHANGE UP (ref 0–0.07)
IMM GRANULOCYTES NFR BLD AUTO: 1 % — HIGH (ref 0–0.9)
LYMPHOCYTES # BLD AUTO: 0.79 K/UL — LOW (ref 1–3.3)
LYMPHOCYTES NFR BLD AUTO: 19.5 % — SIGNIFICANT CHANGE UP (ref 13–44)
MAGNESIUM SERPL-MCNC: 1.8 MG/DL — SIGNIFICANT CHANGE UP (ref 1.6–2.6)
MCHC RBC-ENTMCNC: 28.2 PG — SIGNIFICANT CHANGE UP (ref 27–34)
MCHC RBC-ENTMCNC: 32.2 G/DL — SIGNIFICANT CHANGE UP (ref 32–36)
MCV RBC AUTO: 87.8 FL — SIGNIFICANT CHANGE UP (ref 80–100)
MONOCYTES # BLD AUTO: 1.06 K/UL — HIGH (ref 0–0.9)
MONOCYTES NFR BLD AUTO: 26.2 % — HIGH (ref 2–14)
NEUTROPHILS # BLD AUTO: 2.16 K/UL — SIGNIFICANT CHANGE UP (ref 1.8–7.4)
NEUTROPHILS NFR BLD AUTO: 53.3 % — SIGNIFICANT CHANGE UP (ref 43–77)
NRBC # BLD AUTO: 0.02 K/UL — HIGH (ref 0–0)
NRBC # FLD: 0.02 K/UL — HIGH (ref 0–0)
NRBC BLD AUTO-RTO: 0 /100 WBCS — SIGNIFICANT CHANGE UP (ref 0–0)
PHOSPHATE SERPL-MCNC: 2.9 MG/DL — SIGNIFICANT CHANGE UP (ref 2.5–4.5)
PLATELET # BLD AUTO: 234 K/UL — SIGNIFICANT CHANGE UP (ref 150–400)
PMV BLD: 10.9 FL — SIGNIFICANT CHANGE UP (ref 7–13)
POTASSIUM SERPL-MCNC: 4.6 MMOL/L — SIGNIFICANT CHANGE UP (ref 3.5–5.3)
POTASSIUM SERPL-SCNC: 4.6 MMOL/L — SIGNIFICANT CHANGE UP (ref 3.5–5.3)
PROT SERPL-MCNC: 5.5 G/DL — LOW (ref 6–8.3)
RBC # BLD: 2.62 M/UL — LOW (ref 3.8–5.2)
RBC # FLD: 17.8 % — HIGH (ref 10.3–14.5)
SODIUM SERPL-SCNC: 141 MMOL/L — SIGNIFICANT CHANGE UP (ref 135–145)
WBC # BLD: 4.05 K/UL — SIGNIFICANT CHANGE UP (ref 3.8–10.5)
WBC # FLD AUTO: 4.05 K/UL — SIGNIFICANT CHANGE UP (ref 3.8–10.5)

## 2025-07-18 PROCEDURE — 99233 SBSQ HOSP IP/OBS HIGH 50: CPT | Mod: GC

## 2025-07-18 PROCEDURE — 99233 SBSQ HOSP IP/OBS HIGH 50: CPT

## 2025-07-18 RX ORDER — MAGNESIUM SULFATE 500 MG/ML
1 SYRINGE (ML) INJECTION ONCE
Refills: 0 | Status: COMPLETED | OUTPATIENT
Start: 2025-07-18 | End: 2025-07-18

## 2025-07-18 RX ORDER — APIXABAN 5 MG/1
2.5 TABLET, FILM COATED ORAL
Refills: 0 | Status: DISCONTINUED | OUTPATIENT
Start: 2025-07-18 | End: 2025-07-21

## 2025-07-18 RX ADMIN — Medication 100 GRAM(S): at 13:43

## 2025-07-18 RX ADMIN — TIZANIDINE 4 MILLIGRAM(S): 4 TABLET ORAL at 23:16

## 2025-07-18 RX ADMIN — ATORVASTATIN CALCIUM 10 MILLIGRAM(S): 80 TABLET, FILM COATED ORAL at 23:16

## 2025-07-18 RX ADMIN — LEFLUNOMIDE 10 MILLIGRAM(S): 10 TABLET ORAL at 13:43

## 2025-07-18 RX ADMIN — IPRATROPIUM BROMIDE AND ALBUTEROL SULFATE 3 MILLILITER(S): .5; 2.5 SOLUTION RESPIRATORY (INHALATION) at 03:17

## 2025-07-18 RX ADMIN — TIOTROPIUM BROMIDE INHALATION SPRAY 2 PUFF(S): 3.12 SPRAY, METERED RESPIRATORY (INHALATION) at 05:32

## 2025-07-18 RX ADMIN — Medication 40 MILLIGRAM(S): at 05:32

## 2025-07-18 RX ADMIN — MIDODRINE HYDROCHLORIDE 10 MILLIGRAM(S): 5 TABLET ORAL at 05:32

## 2025-07-18 RX ADMIN — IPRATROPIUM BROMIDE AND ALBUTEROL SULFATE 3 MILLILITER(S): .5; 2.5 SOLUTION RESPIRATORY (INHALATION) at 13:42

## 2025-07-18 RX ADMIN — MIDODRINE HYDROCHLORIDE 5 MILLIGRAM(S): 5 TABLET ORAL at 18:16

## 2025-07-18 RX ADMIN — FOLIC ACID 1 MILLIGRAM(S): 1 TABLET ORAL at 13:43

## 2025-07-18 RX ADMIN — Medication 1 DOSE(S): at 05:32

## 2025-07-18 RX ADMIN — FUROSEMIDE 40 MILLIGRAM(S): 10 INJECTION INTRAMUSCULAR; INTRAVENOUS at 05:33

## 2025-07-18 RX ADMIN — IPRATROPIUM BROMIDE AND ALBUTEROL SULFATE 3 MILLILITER(S): .5; 2.5 SOLUTION RESPIRATORY (INHALATION) at 07:28

## 2025-07-18 RX ADMIN — FUROSEMIDE 40 MILLIGRAM(S): 10 INJECTION INTRAMUSCULAR; INTRAVENOUS at 13:43

## 2025-07-18 RX ADMIN — IPRATROPIUM BROMIDE AND ALBUTEROL SULFATE 3 MILLILITER(S): .5; 2.5 SOLUTION RESPIRATORY (INHALATION) at 20:28

## 2025-07-18 RX ADMIN — GABAPENTIN 400 MILLIGRAM(S): 400 CAPSULE ORAL at 18:12

## 2025-07-18 RX ADMIN — AMIODARONE HYDROCHLORIDE 100 MILLIGRAM(S): 50 INJECTION, SOLUTION INTRAVENOUS at 05:33

## 2025-07-18 RX ADMIN — GABAPENTIN 400 MILLIGRAM(S): 400 CAPSULE ORAL at 05:33

## 2025-07-18 RX ADMIN — APIXABAN 2.5 MILLIGRAM(S): 5 TABLET, FILM COATED ORAL at 13:43

## 2025-07-18 RX ADMIN — PREDNISONE 7.5 MILLIGRAM(S): 20 TABLET ORAL at 05:33

## 2025-07-19 LAB
ALBUMIN SERPL ELPH-MCNC: 3.3 G/DL — SIGNIFICANT CHANGE UP (ref 3.3–5)
ALP SERPL-CCNC: 60 U/L — SIGNIFICANT CHANGE UP (ref 40–120)
ALT FLD-CCNC: 22 U/L — SIGNIFICANT CHANGE UP (ref 12–78)
ANION GAP SERPL CALC-SCNC: 6 MMOL/L — SIGNIFICANT CHANGE UP (ref 5–17)
AST SERPL-CCNC: 7 U/L — LOW (ref 15–37)
BASOPHILS # BLD AUTO: 0.01 K/UL — SIGNIFICANT CHANGE UP (ref 0–0.2)
BASOPHILS NFR BLD AUTO: 0.2 % — SIGNIFICANT CHANGE UP (ref 0–2)
BILIRUB SERPL-MCNC: 0.7 MG/DL — SIGNIFICANT CHANGE UP (ref 0.2–1.2)
BUN SERPL-MCNC: 40 MG/DL — HIGH (ref 7–23)
CALCIUM SERPL-MCNC: 8.7 MG/DL — SIGNIFICANT CHANGE UP (ref 8.5–10.1)
CHLORIDE SERPL-SCNC: 101 MMOL/L — SIGNIFICANT CHANGE UP (ref 96–108)
CO2 SERPL-SCNC: 34 MMOL/L — HIGH (ref 22–31)
CREAT SERPL-MCNC: 1.2 MG/DL — SIGNIFICANT CHANGE UP (ref 0.5–1.3)
EGFR: 45 ML/MIN/1.73M2 — LOW
EGFR: 45 ML/MIN/1.73M2 — LOW
EOSINOPHIL # BLD AUTO: 0 K/UL — SIGNIFICANT CHANGE UP (ref 0–0.5)
EOSINOPHIL NFR BLD AUTO: 0 % — SIGNIFICANT CHANGE UP (ref 0–6)
GLUCOSE SERPL-MCNC: 192 MG/DL — HIGH (ref 70–99)
HCT VFR BLD CALC: 27.3 % — LOW (ref 34.5–45)
HGB BLD-MCNC: 9 G/DL — LOW (ref 11.5–15.5)
IMM GRANULOCYTES # BLD AUTO: 0.03 K/UL — SIGNIFICANT CHANGE UP (ref 0–0.07)
IMM GRANULOCYTES NFR BLD AUTO: 0.6 % — SIGNIFICANT CHANGE UP (ref 0–0.9)
LYMPHOCYTES # BLD AUTO: 0.26 K/UL — LOW (ref 1–3.3)
LYMPHOCYTES NFR BLD AUTO: 5.2 % — LOW (ref 13–44)
MAGNESIUM SERPL-MCNC: 1.8 MG/DL — SIGNIFICANT CHANGE UP (ref 1.6–2.6)
MCHC RBC-ENTMCNC: 29.4 PG — SIGNIFICANT CHANGE UP (ref 27–34)
MCHC RBC-ENTMCNC: 33 G/DL — SIGNIFICANT CHANGE UP (ref 32–36)
MCV RBC AUTO: 89.2 FL — SIGNIFICANT CHANGE UP (ref 80–100)
MONOCYTES # BLD AUTO: 0.56 K/UL — SIGNIFICANT CHANGE UP (ref 0–0.9)
MONOCYTES NFR BLD AUTO: 11.3 % — SIGNIFICANT CHANGE UP (ref 2–14)
NEUTROPHILS # BLD AUTO: 4.11 K/UL — SIGNIFICANT CHANGE UP (ref 1.8–7.4)
NEUTROPHILS NFR BLD AUTO: 82.7 % — HIGH (ref 43–77)
NRBC # BLD AUTO: 0.02 K/UL — HIGH (ref 0–0)
NRBC # FLD: 0.02 K/UL — HIGH (ref 0–0)
NRBC BLD AUTO-RTO: 0 /100 WBCS — SIGNIFICANT CHANGE UP (ref 0–0)
PHOSPHATE SERPL-MCNC: 3.1 MG/DL — SIGNIFICANT CHANGE UP (ref 2.5–4.5)
PLATELET # BLD AUTO: 164 K/UL — SIGNIFICANT CHANGE UP (ref 150–400)
PMV BLD: 12.2 FL — SIGNIFICANT CHANGE UP (ref 7–13)
POTASSIUM SERPL-MCNC: 4.8 MMOL/L — SIGNIFICANT CHANGE UP (ref 3.5–5.3)
POTASSIUM SERPL-SCNC: 4.8 MMOL/L — SIGNIFICANT CHANGE UP (ref 3.5–5.3)
PROT SERPL-MCNC: 5.8 G/DL — LOW (ref 6–8.3)
RBC # BLD: 3.06 M/UL — LOW (ref 3.8–5.2)
RBC # FLD: 17.2 % — HIGH (ref 10.3–14.5)
SODIUM SERPL-SCNC: 141 MMOL/L — SIGNIFICANT CHANGE UP (ref 135–145)
WBC # BLD: 4.97 K/UL — SIGNIFICANT CHANGE UP (ref 3.8–10.5)
WBC # FLD AUTO: 4.97 K/UL — SIGNIFICANT CHANGE UP (ref 3.8–10.5)

## 2025-07-19 PROCEDURE — 99233 SBSQ HOSP IP/OBS HIGH 50: CPT | Mod: GC

## 2025-07-19 PROCEDURE — 99233 SBSQ HOSP IP/OBS HIGH 50: CPT

## 2025-07-19 RX ORDER — FUROSEMIDE 10 MG/ML
20 INJECTION INTRAMUSCULAR; INTRAVENOUS ONCE
Refills: 0 | Status: COMPLETED | OUTPATIENT
Start: 2025-07-19 | End: 2025-07-19

## 2025-07-19 RX ADMIN — FOLIC ACID 1 MILLIGRAM(S): 1 TABLET ORAL at 12:31

## 2025-07-19 RX ADMIN — IPRATROPIUM BROMIDE AND ALBUTEROL SULFATE 3 MILLILITER(S): .5; 2.5 SOLUTION RESPIRATORY (INHALATION) at 15:41

## 2025-07-19 RX ADMIN — Medication 40 MILLIGRAM(S): at 05:45

## 2025-07-19 RX ADMIN — GABAPENTIN 400 MILLIGRAM(S): 400 CAPSULE ORAL at 17:46

## 2025-07-19 RX ADMIN — Medication 1 APPLICATION(S): at 12:33

## 2025-07-19 RX ADMIN — TIZANIDINE 4 MILLIGRAM(S): 4 TABLET ORAL at 21:54

## 2025-07-19 RX ADMIN — MIDODRINE HYDROCHLORIDE 10 MILLIGRAM(S): 5 TABLET ORAL at 05:43

## 2025-07-19 RX ADMIN — TIOTROPIUM BROMIDE INHALATION SPRAY 2 PUFF(S): 3.12 SPRAY, METERED RESPIRATORY (INHALATION) at 05:46

## 2025-07-19 RX ADMIN — ATORVASTATIN CALCIUM 10 MILLIGRAM(S): 80 TABLET, FILM COATED ORAL at 21:55

## 2025-07-19 RX ADMIN — MIDODRINE HYDROCHLORIDE 5 MILLIGRAM(S): 5 TABLET ORAL at 17:47

## 2025-07-19 RX ADMIN — Medication 1 DOSE(S): at 20:24

## 2025-07-19 RX ADMIN — IPRATROPIUM BROMIDE AND ALBUTEROL SULFATE 3 MILLILITER(S): .5; 2.5 SOLUTION RESPIRATORY (INHALATION) at 20:05

## 2025-07-19 RX ADMIN — Medication 1 DOSE(S): at 05:45

## 2025-07-19 RX ADMIN — FUROSEMIDE 40 MILLIGRAM(S): 10 INJECTION INTRAMUSCULAR; INTRAVENOUS at 10:18

## 2025-07-19 RX ADMIN — FUROSEMIDE 20 MILLIGRAM(S): 10 INJECTION INTRAMUSCULAR; INTRAVENOUS at 05:44

## 2025-07-19 RX ADMIN — AMIODARONE HYDROCHLORIDE 100 MILLIGRAM(S): 50 INJECTION, SOLUTION INTRAVENOUS at 05:43

## 2025-07-19 RX ADMIN — PREDNISONE 7.5 MILLIGRAM(S): 20 TABLET ORAL at 05:43

## 2025-07-19 RX ADMIN — FUROSEMIDE 40 MILLIGRAM(S): 10 INJECTION INTRAMUSCULAR; INTRAVENOUS at 20:24

## 2025-07-19 RX ADMIN — IPRATROPIUM BROMIDE AND ALBUTEROL SULFATE 3 MILLILITER(S): .5; 2.5 SOLUTION RESPIRATORY (INHALATION) at 07:32

## 2025-07-19 RX ADMIN — APIXABAN 2.5 MILLIGRAM(S): 5 TABLET, FILM COATED ORAL at 05:44

## 2025-07-19 RX ADMIN — LEFLUNOMIDE 10 MILLIGRAM(S): 10 TABLET ORAL at 12:30

## 2025-07-19 RX ADMIN — IPRATROPIUM BROMIDE AND ALBUTEROL SULFATE 3 MILLILITER(S): .5; 2.5 SOLUTION RESPIRATORY (INHALATION) at 11:18

## 2025-07-19 RX ADMIN — GABAPENTIN 400 MILLIGRAM(S): 400 CAPSULE ORAL at 05:44

## 2025-07-19 RX ADMIN — APIXABAN 2.5 MILLIGRAM(S): 5 TABLET, FILM COATED ORAL at 17:46

## 2025-07-19 RX ADMIN — IPRATROPIUM BROMIDE AND ALBUTEROL SULFATE 3 MILLILITER(S): .5; 2.5 SOLUTION RESPIRATORY (INHALATION) at 01:12

## 2025-07-20 DIAGNOSIS — R25.1 TREMOR, UNSPECIFIED: ICD-10-CM

## 2025-07-20 LAB
ALBUMIN SERPL ELPH-MCNC: 3.3 G/DL — SIGNIFICANT CHANGE UP (ref 3.3–5)
ALP SERPL-CCNC: 62 U/L — SIGNIFICANT CHANGE UP (ref 40–120)
ALT FLD-CCNC: 24 U/L — SIGNIFICANT CHANGE UP (ref 12–78)
ANION GAP SERPL CALC-SCNC: 5 MMOL/L — SIGNIFICANT CHANGE UP (ref 5–17)
AST SERPL-CCNC: 8 U/L — LOW (ref 15–37)
BASOPHILS # BLD AUTO: 0 K/UL — SIGNIFICANT CHANGE UP (ref 0–0.2)
BASOPHILS # BLD AUTO: 0.01 K/UL — SIGNIFICANT CHANGE UP (ref 0–0.2)
BASOPHILS NFR BLD AUTO: 0 % — SIGNIFICANT CHANGE UP (ref 0–2)
BASOPHILS NFR BLD AUTO: 0.3 % — SIGNIFICANT CHANGE UP (ref 0–2)
BILIRUB SERPL-MCNC: 0.7 MG/DL — SIGNIFICANT CHANGE UP (ref 0.2–1.2)
BUN SERPL-MCNC: 40 MG/DL — HIGH (ref 7–23)
CALCIUM SERPL-MCNC: 9.2 MG/DL — SIGNIFICANT CHANGE UP (ref 8.5–10.1)
CHLORIDE SERPL-SCNC: 101 MMOL/L — SIGNIFICANT CHANGE UP (ref 96–108)
CO2 SERPL-SCNC: 34 MMOL/L — HIGH (ref 22–31)
CREAT SERPL-MCNC: 1.1 MG/DL — SIGNIFICANT CHANGE UP (ref 0.5–1.3)
EGFR: 50 ML/MIN/1.73M2 — LOW
EGFR: 50 ML/MIN/1.73M2 — LOW
EOSINOPHIL # BLD AUTO: 0 K/UL — SIGNIFICANT CHANGE UP (ref 0–0.5)
EOSINOPHIL # BLD AUTO: 0.01 K/UL — SIGNIFICANT CHANGE UP (ref 0–0.5)
EOSINOPHIL NFR BLD AUTO: 0 % — SIGNIFICANT CHANGE UP (ref 0–6)
EOSINOPHIL NFR BLD AUTO: 0.3 % — SIGNIFICANT CHANGE UP (ref 0–6)
FERRITIN SERPL-MCNC: 4952 NG/ML — HIGH (ref 13–330)
FOLATE SERPL-MCNC: 9.3 NG/ML — SIGNIFICANT CHANGE UP
GLUCOSE SERPL-MCNC: 87 MG/DL — SIGNIFICANT CHANGE UP (ref 70–99)
HCT VFR BLD CALC: 27.3 % — LOW (ref 34.5–45)
HCT VFR BLD CALC: 28.9 % — LOW (ref 34.5–45)
HGB BLD-MCNC: 8.9 G/DL — LOW (ref 11.5–15.5)
HGB BLD-MCNC: 9.3 G/DL — LOW (ref 11.5–15.5)
IMM GRANULOCYTES # BLD AUTO: 0.02 K/UL — SIGNIFICANT CHANGE UP (ref 0–0.07)
IMM GRANULOCYTES # BLD AUTO: 0.04 K/UL — SIGNIFICANT CHANGE UP (ref 0–0.07)
IMM GRANULOCYTES NFR BLD AUTO: 0.6 % — SIGNIFICANT CHANGE UP (ref 0–0.9)
IMM GRANULOCYTES NFR BLD AUTO: 1 % — HIGH (ref 0–0.9)
IRON SATN MFR SERPL: 127 UG/DL — SIGNIFICANT CHANGE UP (ref 30–160)
LYMPHOCYTES # BLD AUTO: 0.31 K/UL — LOW (ref 1–3.3)
LYMPHOCYTES # BLD AUTO: 0.99 K/UL — LOW (ref 1–3.3)
LYMPHOCYTES NFR BLD AUTO: 27.4 % — SIGNIFICANT CHANGE UP (ref 13–44)
LYMPHOCYTES NFR BLD AUTO: 8.1 % — LOW (ref 13–44)
MAGNESIUM SERPL-MCNC: 1.9 MG/DL — SIGNIFICANT CHANGE UP (ref 1.6–2.6)
MCHC RBC-ENTMCNC: 29 PG — SIGNIFICANT CHANGE UP (ref 27–34)
MCHC RBC-ENTMCNC: 29.1 PG — SIGNIFICANT CHANGE UP (ref 27–34)
MCHC RBC-ENTMCNC: 32.2 G/DL — SIGNIFICANT CHANGE UP (ref 32–36)
MCHC RBC-ENTMCNC: 32.6 G/DL — SIGNIFICANT CHANGE UP (ref 32–36)
MCV RBC AUTO: 88.9 FL — SIGNIFICANT CHANGE UP (ref 80–100)
MCV RBC AUTO: 90.3 FL — SIGNIFICANT CHANGE UP (ref 80–100)
MONOCYTES # BLD AUTO: 0.49 K/UL — SIGNIFICANT CHANGE UP (ref 0–0.9)
MONOCYTES # BLD AUTO: 0.77 K/UL — SIGNIFICANT CHANGE UP (ref 0–0.9)
MONOCYTES NFR BLD AUTO: 12.9 % — SIGNIFICANT CHANGE UP (ref 2–14)
MONOCYTES NFR BLD AUTO: 21.3 % — HIGH (ref 2–14)
NEUTROPHILS # BLD AUTO: 1.81 K/UL — SIGNIFICANT CHANGE UP (ref 1.8–7.4)
NEUTROPHILS # BLD AUTO: 2.97 K/UL — SIGNIFICANT CHANGE UP (ref 1.8–7.4)
NEUTROPHILS NFR BLD AUTO: 50.1 % — SIGNIFICANT CHANGE UP (ref 43–77)
NEUTROPHILS NFR BLD AUTO: 78 % — HIGH (ref 43–77)
NRBC # BLD AUTO: 0 K/UL — SIGNIFICANT CHANGE UP (ref 0–0)
NRBC # BLD AUTO: 0.02 K/UL — HIGH (ref 0–0)
NRBC # FLD: 0 K/UL — SIGNIFICANT CHANGE UP (ref 0–0)
NRBC # FLD: 0.02 K/UL — HIGH (ref 0–0)
NRBC BLD AUTO-RTO: 0 /100 WBCS — SIGNIFICANT CHANGE UP (ref 0–0)
NRBC BLD AUTO-RTO: 0 /100 WBCS — SIGNIFICANT CHANGE UP (ref 0–0)
PHOSPHATE SERPL-MCNC: 3.3 MG/DL — SIGNIFICANT CHANGE UP (ref 2.5–4.5)
PLATELET # BLD AUTO: 229 K/UL — SIGNIFICANT CHANGE UP (ref 150–400)
PLATELET # BLD AUTO: 240 K/UL — SIGNIFICANT CHANGE UP (ref 150–400)
PMV BLD: 10.8 FL — SIGNIFICANT CHANGE UP (ref 7–13)
PMV BLD: 11 FL — SIGNIFICANT CHANGE UP (ref 7–13)
POTASSIUM SERPL-MCNC: 4.9 MMOL/L — SIGNIFICANT CHANGE UP (ref 3.5–5.3)
POTASSIUM SERPL-SCNC: 4.9 MMOL/L — SIGNIFICANT CHANGE UP (ref 3.5–5.3)
PROT SERPL-MCNC: 5.9 G/DL — LOW (ref 6–8.3)
RBC # BLD: 3.07 M/UL — LOW (ref 3.8–5.2)
RBC # BLD: 3.2 M/UL — LOW (ref 3.8–5.2)
RBC # FLD: 17.1 % — HIGH (ref 10.3–14.5)
RBC # FLD: 17.2 % — HIGH (ref 10.3–14.5)
SODIUM SERPL-SCNC: 140 MMOL/L — SIGNIFICANT CHANGE UP (ref 135–145)
VIT B12 SERPL-MCNC: 1878 PG/ML — HIGH (ref 232–1245)
WBC # BLD: 3.61 K/UL — LOW (ref 3.8–10.5)
WBC # BLD: 3.81 K/UL — SIGNIFICANT CHANGE UP (ref 3.8–10.5)
WBC # FLD AUTO: 3.61 K/UL — LOW (ref 3.8–10.5)
WBC # FLD AUTO: 3.81 K/UL — SIGNIFICANT CHANGE UP (ref 3.8–10.5)

## 2025-07-20 PROCEDURE — 99233 SBSQ HOSP IP/OBS HIGH 50: CPT | Mod: GC

## 2025-07-20 PROCEDURE — 99233 SBSQ HOSP IP/OBS HIGH 50: CPT

## 2025-07-20 RX ORDER — TORSEMIDE 10 MG
20 TABLET ORAL DAILY
Refills: 0 | Status: DISCONTINUED | OUTPATIENT
Start: 2025-07-20 | End: 2025-07-21

## 2025-07-20 RX ORDER — TORSEMIDE 10 MG
20 TABLET ORAL DAILY
Refills: 0 | Status: DISCONTINUED | OUTPATIENT
Start: 2025-07-20 | End: 2025-07-20

## 2025-07-20 RX ADMIN — APIXABAN 2.5 MILLIGRAM(S): 5 TABLET, FILM COATED ORAL at 17:31

## 2025-07-20 RX ADMIN — IPRATROPIUM BROMIDE AND ALBUTEROL SULFATE 3 MILLILITER(S): .5; 2.5 SOLUTION RESPIRATORY (INHALATION) at 00:20

## 2025-07-20 RX ADMIN — FOLIC ACID 1 MILLIGRAM(S): 1 TABLET ORAL at 11:54

## 2025-07-20 RX ADMIN — TIOTROPIUM BROMIDE INHALATION SPRAY 2 PUFF(S): 3.12 SPRAY, METERED RESPIRATORY (INHALATION) at 06:00

## 2025-07-20 RX ADMIN — IPRATROPIUM BROMIDE AND ALBUTEROL SULFATE 3 MILLILITER(S): .5; 2.5 SOLUTION RESPIRATORY (INHALATION) at 04:48

## 2025-07-20 RX ADMIN — Medication 1 DOSE(S): at 06:00

## 2025-07-20 RX ADMIN — ATORVASTATIN CALCIUM 10 MILLIGRAM(S): 80 TABLET, FILM COATED ORAL at 22:05

## 2025-07-20 RX ADMIN — Medication 1 APPLICATION(S): at 11:54

## 2025-07-20 RX ADMIN — APIXABAN 2.5 MILLIGRAM(S): 5 TABLET, FILM COATED ORAL at 05:27

## 2025-07-20 RX ADMIN — IPRATROPIUM BROMIDE AND ALBUTEROL SULFATE 3 MILLILITER(S): .5; 2.5 SOLUTION RESPIRATORY (INHALATION) at 22:49

## 2025-07-20 RX ADMIN — IPRATROPIUM BROMIDE AND ALBUTEROL SULFATE 3 MILLILITER(S): .5; 2.5 SOLUTION RESPIRATORY (INHALATION) at 15:18

## 2025-07-20 RX ADMIN — IPRATROPIUM BROMIDE AND ALBUTEROL SULFATE 3 MILLILITER(S): .5; 2.5 SOLUTION RESPIRATORY (INHALATION) at 07:40

## 2025-07-20 RX ADMIN — MIDODRINE HYDROCHLORIDE 10 MILLIGRAM(S): 5 TABLET ORAL at 05:27

## 2025-07-20 RX ADMIN — GABAPENTIN 400 MILLIGRAM(S): 400 CAPSULE ORAL at 05:26

## 2025-07-20 RX ADMIN — Medication 40 MILLIGRAM(S): at 05:27

## 2025-07-20 RX ADMIN — Medication 1 DOSE(S): at 21:07

## 2025-07-20 RX ADMIN — LEFLUNOMIDE 10 MILLIGRAM(S): 10 TABLET ORAL at 11:54

## 2025-07-20 RX ADMIN — GABAPENTIN 400 MILLIGRAM(S): 400 CAPSULE ORAL at 17:31

## 2025-07-20 RX ADMIN — AMIODARONE HYDROCHLORIDE 100 MILLIGRAM(S): 50 INJECTION, SOLUTION INTRAVENOUS at 05:25

## 2025-07-20 RX ADMIN — TIZANIDINE 4 MILLIGRAM(S): 4 TABLET ORAL at 22:04

## 2025-07-20 RX ADMIN — IPRATROPIUM BROMIDE AND ALBUTEROL SULFATE 3 MILLILITER(S): .5; 2.5 SOLUTION RESPIRATORY (INHALATION) at 11:18

## 2025-07-20 RX ADMIN — PREDNISONE 7.5 MILLIGRAM(S): 20 TABLET ORAL at 05:30

## 2025-07-20 RX ADMIN — FUROSEMIDE 40 MILLIGRAM(S): 10 INJECTION INTRAMUSCULAR; INTRAVENOUS at 05:25

## 2025-07-20 RX ADMIN — IPRATROPIUM BROMIDE AND ALBUTEROL SULFATE 3 MILLILITER(S): .5; 2.5 SOLUTION RESPIRATORY (INHALATION) at 19:24

## 2025-07-21 ENCOUNTER — TRANSCRIPTION ENCOUNTER (OUTPATIENT)
Age: 85
End: 2025-07-21

## 2025-07-21 VITALS
SYSTOLIC BLOOD PRESSURE: 109 MMHG | TEMPERATURE: 99 F | HEART RATE: 71 BPM | DIASTOLIC BLOOD PRESSURE: 58 MMHG | RESPIRATION RATE: 18 BRPM | OXYGEN SATURATION: 95 %

## 2025-07-21 LAB
ALBUMIN SERPL ELPH-MCNC: 3.2 G/DL — LOW (ref 3.3–5)
ALP SERPL-CCNC: 84 U/L — SIGNIFICANT CHANGE UP (ref 40–120)
ALT FLD-CCNC: 30 U/L — SIGNIFICANT CHANGE UP (ref 12–78)
ANION GAP SERPL CALC-SCNC: 3 MMOL/L — LOW (ref 5–17)
AST SERPL-CCNC: 12 U/L — LOW (ref 15–37)
BASOPHILS # BLD AUTO: 0 K/UL — SIGNIFICANT CHANGE UP (ref 0–0.2)
BASOPHILS NFR BLD AUTO: 0 % — SIGNIFICANT CHANGE UP (ref 0–2)
BILIRUB SERPL-MCNC: 0.9 MG/DL — SIGNIFICANT CHANGE UP (ref 0.2–1.2)
BUN SERPL-MCNC: 40 MG/DL — HIGH (ref 7–23)
CALCIUM SERPL-MCNC: 9.2 MG/DL — SIGNIFICANT CHANGE UP (ref 8.5–10.1)
CHLORIDE SERPL-SCNC: 101 MMOL/L — SIGNIFICANT CHANGE UP (ref 96–108)
CO2 SERPL-SCNC: 36 MMOL/L — HIGH (ref 22–31)
CREAT SERPL-MCNC: 1.1 MG/DL — SIGNIFICANT CHANGE UP (ref 0.5–1.3)
EGFR: 50 ML/MIN/1.73M2 — LOW
EGFR: 50 ML/MIN/1.73M2 — LOW
EOSINOPHIL # BLD AUTO: 0 K/UL — SIGNIFICANT CHANGE UP (ref 0–0.5)
EOSINOPHIL NFR BLD AUTO: 0 % — SIGNIFICANT CHANGE UP (ref 0–6)
GLUCOSE SERPL-MCNC: 94 MG/DL — SIGNIFICANT CHANGE UP (ref 70–99)
HCT VFR BLD CALC: 25.7 % — LOW (ref 34.5–45)
HGB BLD-MCNC: 8.2 G/DL — LOW (ref 11.5–15.5)
IMM GRANULOCYTES # BLD AUTO: 0.03 K/UL — SIGNIFICANT CHANGE UP (ref 0–0.07)
IMM GRANULOCYTES NFR BLD AUTO: 0.8 % — SIGNIFICANT CHANGE UP (ref 0–0.9)
LYMPHOCYTES # BLD AUTO: 0.67 K/UL — LOW (ref 1–3.3)
LYMPHOCYTES NFR BLD AUTO: 18.8 % — SIGNIFICANT CHANGE UP (ref 13–44)
MCHC RBC-ENTMCNC: 28.9 PG — SIGNIFICANT CHANGE UP (ref 27–34)
MCHC RBC-ENTMCNC: 31.9 G/DL — LOW (ref 32–36)
MCV RBC AUTO: 90.5 FL — SIGNIFICANT CHANGE UP (ref 80–100)
MONOCYTES # BLD AUTO: 0.52 K/UL — SIGNIFICANT CHANGE UP (ref 0–0.9)
MONOCYTES NFR BLD AUTO: 14.6 % — HIGH (ref 2–14)
NEUTROPHILS # BLD AUTO: 2.34 K/UL — SIGNIFICANT CHANGE UP (ref 1.8–7.4)
NEUTROPHILS NFR BLD AUTO: 65.8 % — SIGNIFICANT CHANGE UP (ref 43–77)
NRBC # BLD AUTO: 0.02 K/UL — HIGH (ref 0–0)
NRBC # FLD: 0.02 K/UL — HIGH (ref 0–0)
NRBC BLD AUTO-RTO: 0 /100 WBCS — SIGNIFICANT CHANGE UP (ref 0–0)
PLATELET # BLD AUTO: 227 K/UL — SIGNIFICANT CHANGE UP (ref 150–400)
PMV BLD: 10.5 FL — SIGNIFICANT CHANGE UP (ref 7–13)
POTASSIUM SERPL-MCNC: 4.5 MMOL/L — SIGNIFICANT CHANGE UP (ref 3.5–5.3)
POTASSIUM SERPL-SCNC: 4.5 MMOL/L — SIGNIFICANT CHANGE UP (ref 3.5–5.3)
PROT SERPL-MCNC: 5.5 G/DL — LOW (ref 6–8.3)
RBC # BLD: 2.84 M/UL — LOW (ref 3.8–5.2)
RBC # FLD: 16.6 % — HIGH (ref 10.3–14.5)
SODIUM SERPL-SCNC: 140 MMOL/L — SIGNIFICANT CHANGE UP (ref 135–145)
WBC # BLD: 3.56 K/UL — LOW (ref 3.8–10.5)
WBC # FLD AUTO: 3.56 K/UL — LOW (ref 3.8–10.5)

## 2025-07-21 PROCEDURE — 85610 PROTHROMBIN TIME: CPT

## 2025-07-21 PROCEDURE — 94640 AIRWAY INHALATION TREATMENT: CPT

## 2025-07-21 PROCEDURE — 85025 COMPLETE CBC W/AUTO DIFF WBC: CPT

## 2025-07-21 PROCEDURE — 36430 TRANSFUSION BLD/BLD COMPNT: CPT

## 2025-07-21 PROCEDURE — 86901 BLOOD TYPING SEROLOGIC RH(D): CPT

## 2025-07-21 PROCEDURE — 85018 HEMOGLOBIN: CPT

## 2025-07-21 PROCEDURE — P9040: CPT

## 2025-07-21 PROCEDURE — 93005 ELECTROCARDIOGRAM TRACING: CPT

## 2025-07-21 PROCEDURE — 94660 CPAP INITIATION&MGMT: CPT

## 2025-07-21 PROCEDURE — 94760 N-INVAS EAR/PLS OXIMETRY 1: CPT

## 2025-07-21 PROCEDURE — 83880 ASSAY OF NATRIURETIC PEPTIDE: CPT

## 2025-07-21 PROCEDURE — 84100 ASSAY OF PHOSPHORUS: CPT

## 2025-07-21 PROCEDURE — 83540 ASSAY OF IRON: CPT

## 2025-07-21 PROCEDURE — 80053 COMPREHEN METABOLIC PANEL: CPT

## 2025-07-21 PROCEDURE — 86923 COMPATIBILITY TEST ELECTRIC: CPT

## 2025-07-21 PROCEDURE — 82746 ASSAY OF FOLIC ACID SERUM: CPT

## 2025-07-21 PROCEDURE — 97110 THERAPEUTIC EXERCISES: CPT

## 2025-07-21 PROCEDURE — 85014 HEMATOCRIT: CPT

## 2025-07-21 PROCEDURE — 97162 PT EVAL MOD COMPLEX 30 MIN: CPT

## 2025-07-21 PROCEDURE — 87637 SARSCOV2&INF A&B&RSV AMP PRB: CPT

## 2025-07-21 PROCEDURE — 86900 BLOOD TYPING SEROLOGIC ABO: CPT

## 2025-07-21 PROCEDURE — 99291 CRITICAL CARE FIRST HOUR: CPT | Mod: 25

## 2025-07-21 PROCEDURE — 99239 HOSP IP/OBS DSCHRG MGMT >30: CPT

## 2025-07-21 PROCEDURE — 84484 ASSAY OF TROPONIN QUANT: CPT

## 2025-07-21 PROCEDURE — 96374 THER/PROPH/DIAG INJ IV PUSH: CPT

## 2025-07-21 PROCEDURE — 82962 GLUCOSE BLOOD TEST: CPT

## 2025-07-21 PROCEDURE — 86850 RBC ANTIBODY SCREEN: CPT

## 2025-07-21 PROCEDURE — 71045 X-RAY EXAM CHEST 1 VIEW: CPT

## 2025-07-21 PROCEDURE — 99232 SBSQ HOSP IP/OBS MODERATE 35: CPT

## 2025-07-21 PROCEDURE — 83735 ASSAY OF MAGNESIUM: CPT

## 2025-07-21 PROCEDURE — 84132 ASSAY OF SERUM POTASSIUM: CPT

## 2025-07-21 PROCEDURE — 36415 COLL VENOUS BLD VENIPUNCTURE: CPT

## 2025-07-21 PROCEDURE — 96375 TX/PRO/DX INJ NEW DRUG ADDON: CPT

## 2025-07-21 PROCEDURE — 97116 GAIT TRAINING THERAPY: CPT

## 2025-07-21 PROCEDURE — 82607 VITAMIN B-12: CPT

## 2025-07-21 PROCEDURE — 82728 ASSAY OF FERRITIN: CPT

## 2025-07-21 PROCEDURE — 85730 THROMBOPLASTIN TIME PARTIAL: CPT

## 2025-07-21 RX ORDER — PREDNISONE 20 MG/1
1 TABLET ORAL
Qty: 0 | Refills: 0 | DISCHARGE
Start: 2025-07-21

## 2025-07-21 RX ORDER — FUROSEMIDE 10 MG/ML
20 INJECTION INTRAMUSCULAR; INTRAVENOUS ONCE
Refills: 0 | Status: COMPLETED | OUTPATIENT
Start: 2025-07-21 | End: 2025-07-21

## 2025-07-21 RX ADMIN — FUROSEMIDE 20 MILLIGRAM(S): 10 INJECTION INTRAMUSCULAR; INTRAVENOUS at 16:05

## 2025-07-21 RX ADMIN — APIXABAN 2.5 MILLIGRAM(S): 5 TABLET, FILM COATED ORAL at 06:00

## 2025-07-21 RX ADMIN — GABAPENTIN 400 MILLIGRAM(S): 400 CAPSULE ORAL at 06:00

## 2025-07-21 RX ADMIN — TIOTROPIUM BROMIDE INHALATION SPRAY 2 PUFF(S): 3.12 SPRAY, METERED RESPIRATORY (INHALATION) at 06:01

## 2025-07-21 RX ADMIN — Medication 1 DOSE(S): at 06:01

## 2025-07-21 RX ADMIN — AMIODARONE HYDROCHLORIDE 100 MILLIGRAM(S): 50 INJECTION, SOLUTION INTRAVENOUS at 05:40

## 2025-07-21 RX ADMIN — LEFLUNOMIDE 10 MILLIGRAM(S): 10 TABLET ORAL at 11:57

## 2025-07-21 RX ADMIN — IPRATROPIUM BROMIDE AND ALBUTEROL SULFATE 3 MILLILITER(S): .5; 2.5 SOLUTION RESPIRATORY (INHALATION) at 10:11

## 2025-07-21 RX ADMIN — IPRATROPIUM BROMIDE AND ALBUTEROL SULFATE 3 MILLILITER(S): .5; 2.5 SOLUTION RESPIRATORY (INHALATION) at 14:42

## 2025-07-21 RX ADMIN — IPRATROPIUM BROMIDE AND ALBUTEROL SULFATE 3 MILLILITER(S): .5; 2.5 SOLUTION RESPIRATORY (INHALATION) at 02:45

## 2025-07-21 RX ADMIN — FOLIC ACID 1 MILLIGRAM(S): 1 TABLET ORAL at 11:56

## 2025-07-21 RX ADMIN — Medication 1 APPLICATION(S): at 11:57

## 2025-07-21 RX ADMIN — MIDODRINE HYDROCHLORIDE 10 MILLIGRAM(S): 5 TABLET ORAL at 05:45

## 2025-07-21 RX ADMIN — IPRATROPIUM BROMIDE AND ALBUTEROL SULFATE 3 MILLILITER(S): .5; 2.5 SOLUTION RESPIRATORY (INHALATION) at 07:15

## 2025-07-21 RX ADMIN — Medication 40 MILLIGRAM(S): at 06:00

## 2025-07-21 RX ADMIN — PREDNISONE 7.5 MILLIGRAM(S): 20 TABLET ORAL at 06:00

## 2025-07-21 RX ADMIN — Medication 20 MILLIGRAM(S): at 06:00

## 2025-07-31 RX ORDER — ACETAMINOPHEN 500 MG/5ML
650 LIQUID (ML) ORAL ONCE
Refills: 0 | Status: COMPLETED | OUTPATIENT
Start: 2025-08-01 | End: 2025-08-01

## 2025-07-31 RX ORDER — FUROSEMIDE 10 MG/ML
40 INJECTION INTRAMUSCULAR; INTRAVENOUS ONCE
Refills: 0 | Status: COMPLETED | OUTPATIENT
Start: 2025-08-01 | End: 2025-08-01

## 2025-07-31 RX ORDER — DIPHENHYDRAMINE HCL 12.5MG/5ML
25 ELIXIR ORAL ONCE
Refills: 0 | Status: COMPLETED | OUTPATIENT
Start: 2025-08-01 | End: 2025-08-01

## 2025-08-01 ENCOUNTER — OUTPATIENT (OUTPATIENT)
Dept: OUTPATIENT SERVICES | Facility: HOSPITAL | Age: 85
LOS: 1 days | End: 2025-08-01
Payer: MEDICARE

## 2025-08-01 VITALS
TEMPERATURE: 98 F | SYSTOLIC BLOOD PRESSURE: 101 MMHG | HEART RATE: 59 BPM | OXYGEN SATURATION: 95 % | RESPIRATION RATE: 17 BRPM | DIASTOLIC BLOOD PRESSURE: 51 MMHG

## 2025-08-01 VITALS
SYSTOLIC BLOOD PRESSURE: 147 MMHG | DIASTOLIC BLOOD PRESSURE: 75 MMHG | RESPIRATION RATE: 18 BRPM | OXYGEN SATURATION: 95 % | HEART RATE: 65 BPM | TEMPERATURE: 98 F

## 2025-08-01 DIAGNOSIS — Z95.828 PRESENCE OF OTHER VASCULAR IMPLANTS AND GRAFTS: Chronic | ICD-10-CM

## 2025-08-01 DIAGNOSIS — Z98.89 OTHER SPECIFIED POSTPROCEDURAL STATES: Chronic | ICD-10-CM

## 2025-08-01 DIAGNOSIS — Z98.890 OTHER SPECIFIED POSTPROCEDURAL STATES: Chronic | ICD-10-CM

## 2025-08-01 DIAGNOSIS — S72.001A FRACTURE OF UNSPECIFIED PART OF NECK OF RIGHT FEMUR, INITIAL ENCOUNTER FOR CLOSED FRACTURE: Chronic | ICD-10-CM

## 2025-08-01 DIAGNOSIS — H26.40 UNSPECIFIED SECONDARY CATARACT: Chronic | ICD-10-CM

## 2025-08-01 DIAGNOSIS — D63.1 ANEMIA IN CHRONIC KIDNEY DISEASE: ICD-10-CM

## 2025-08-01 DIAGNOSIS — Z90.710 ACQUIRED ABSENCE OF BOTH CERVIX AND UTERUS: Chronic | ICD-10-CM

## 2025-08-01 LAB
BLD GP AB SCN SERPL QL: SIGNIFICANT CHANGE UP
HCT VFR BLD CALC: 25.3 % — LOW (ref 34.5–45)
HGB BLD-MCNC: 8.1 G/DL — LOW (ref 11.5–15.5)
MCHC RBC-ENTMCNC: 28.8 PG — SIGNIFICANT CHANGE UP (ref 27–34)
MCHC RBC-ENTMCNC: 32 G/DL — SIGNIFICANT CHANGE UP (ref 32–36)
MCV RBC AUTO: 90 FL — SIGNIFICANT CHANGE UP (ref 80–100)
NRBC # BLD AUTO: 0 K/UL — SIGNIFICANT CHANGE UP (ref 0–0)
NRBC # FLD: 0 K/UL — SIGNIFICANT CHANGE UP (ref 0–0)
NRBC BLD AUTO-RTO: 0 /100 WBCS — SIGNIFICANT CHANGE UP (ref 0–0)
PLATELET # BLD AUTO: 204 K/UL — SIGNIFICANT CHANGE UP (ref 150–400)
PMV BLD: 11.3 FL — SIGNIFICANT CHANGE UP (ref 7–13)
RBC # BLD: 2.81 M/UL — LOW (ref 3.8–5.2)
RBC # FLD: 17.2 % — HIGH (ref 10.3–14.5)
WBC # BLD: 3.88 K/UL — SIGNIFICANT CHANGE UP (ref 3.8–10.5)
WBC # FLD AUTO: 3.88 K/UL — SIGNIFICANT CHANGE UP (ref 3.8–10.5)

## 2025-08-01 PROCEDURE — 86850 RBC ANTIBODY SCREEN: CPT

## 2025-08-01 PROCEDURE — 86900 BLOOD TYPING SEROLOGIC ABO: CPT

## 2025-08-01 PROCEDURE — 36415 COLL VENOUS BLD VENIPUNCTURE: CPT

## 2025-08-01 PROCEDURE — 85027 COMPLETE CBC AUTOMATED: CPT

## 2025-08-01 PROCEDURE — 86901 BLOOD TYPING SEROLOGIC RH(D): CPT

## 2025-08-01 PROCEDURE — 86923 COMPATIBILITY TEST ELECTRIC: CPT

## 2025-08-01 RX ORDER — HEPARIN SODIUM,PORCINE/NS/PF 20/20 ML
300 SYRINGE (ML) INTRAVENOUS ONCE
Refills: 0 | Status: COMPLETED | OUTPATIENT
Start: 2025-08-01 | End: 2025-08-01

## 2025-08-01 RX ADMIN — Medication 25 MILLIGRAM(S): at 11:12

## 2025-08-01 RX ADMIN — Medication 300 UNIT(S): at 18:08

## 2025-08-01 RX ADMIN — Medication 650 MILLIGRAM(S): at 11:12

## 2025-08-01 RX ADMIN — FUROSEMIDE 40 MILLIGRAM(S): 10 INJECTION INTRAMUSCULAR; INTRAVENOUS at 14:46

## 2025-08-14 ENCOUNTER — APPOINTMENT (OUTPATIENT)
Dept: CARDIOLOGY | Facility: CLINIC | Age: 85
End: 2025-08-14

## 2025-08-18 RX ORDER — HEPARIN SODIUM,PORCINE/NS/PF 20/20 ML
300 SYRINGE (ML) INTRAVENOUS ONCE
Refills: 0 | Status: COMPLETED | OUTPATIENT
Start: 2025-08-19 | End: 2025-08-19

## 2025-08-18 RX ORDER — FUROSEMIDE 10 MG/ML
40 INJECTION INTRAMUSCULAR; INTRAVENOUS ONCE
Refills: 0 | Status: COMPLETED | OUTPATIENT
Start: 2025-08-19 | End: 2025-08-19

## 2025-08-18 RX ORDER — DIPHENHYDRAMINE HCL 12.5MG/5ML
25 ELIXIR ORAL ONCE
Refills: 0 | Status: COMPLETED | OUTPATIENT
Start: 2025-08-19 | End: 2025-08-19

## 2025-08-18 RX ORDER — ACETAMINOPHEN 500 MG/5ML
650 LIQUID (ML) ORAL ONCE
Refills: 0 | Status: COMPLETED | OUTPATIENT
Start: 2025-08-19 | End: 2025-08-19

## 2025-08-19 ENCOUNTER — OUTPATIENT (OUTPATIENT)
Dept: OUTPATIENT SERVICES | Facility: HOSPITAL | Age: 85
LOS: 1 days | End: 2025-08-19
Payer: MEDICARE

## 2025-08-19 VITALS
RESPIRATION RATE: 17 BRPM | HEART RATE: 68 BPM | OXYGEN SATURATION: 95 % | SYSTOLIC BLOOD PRESSURE: 108 MMHG | TEMPERATURE: 98 F | DIASTOLIC BLOOD PRESSURE: 62 MMHG

## 2025-08-19 VITALS
OXYGEN SATURATION: 95 % | RESPIRATION RATE: 17 BRPM | HEART RATE: 64 BPM | TEMPERATURE: 98 F | SYSTOLIC BLOOD PRESSURE: 97 MMHG | DIASTOLIC BLOOD PRESSURE: 59 MMHG

## 2025-08-19 DIAGNOSIS — Z95.828 PRESENCE OF OTHER VASCULAR IMPLANTS AND GRAFTS: Chronic | ICD-10-CM

## 2025-08-19 DIAGNOSIS — D63.1 ANEMIA IN CHRONIC KIDNEY DISEASE: ICD-10-CM

## 2025-08-19 DIAGNOSIS — Z98.890 OTHER SPECIFIED POSTPROCEDURAL STATES: Chronic | ICD-10-CM

## 2025-08-19 DIAGNOSIS — H26.40 UNSPECIFIED SECONDARY CATARACT: Chronic | ICD-10-CM

## 2025-08-19 DIAGNOSIS — Z98.89 OTHER SPECIFIED POSTPROCEDURAL STATES: Chronic | ICD-10-CM

## 2025-08-19 DIAGNOSIS — S72.001A FRACTURE OF UNSPECIFIED PART OF NECK OF RIGHT FEMUR, INITIAL ENCOUNTER FOR CLOSED FRACTURE: Chronic | ICD-10-CM

## 2025-08-19 DIAGNOSIS — Z90.710 ACQUIRED ABSENCE OF BOTH CERVIX AND UTERUS: Chronic | ICD-10-CM

## 2025-08-19 LAB
BLD GP AB SCN SERPL QL: SIGNIFICANT CHANGE UP
HCT VFR BLD CALC: 23.5 % — LOW (ref 34.5–45)
HGB BLD-MCNC: 7.6 G/DL — LOW (ref 11.5–15.5)
MCHC RBC-ENTMCNC: 28.3 PG — SIGNIFICANT CHANGE UP (ref 27–34)
MCHC RBC-ENTMCNC: 32.3 G/DL — SIGNIFICANT CHANGE UP (ref 32–36)
MCV RBC AUTO: 87.4 FL — SIGNIFICANT CHANGE UP (ref 80–100)
NRBC # BLD AUTO: 0 K/UL — SIGNIFICANT CHANGE UP (ref 0–0)
NRBC # FLD: 0 K/UL — SIGNIFICANT CHANGE UP (ref 0–0)
NRBC BLD AUTO-RTO: 0 /100 WBCS — SIGNIFICANT CHANGE UP (ref 0–0)
PLATELET # BLD AUTO: 225 K/UL — SIGNIFICANT CHANGE UP (ref 150–400)
PMV BLD: 11 FL — SIGNIFICANT CHANGE UP (ref 7–13)
RBC # BLD: 2.69 M/UL — LOW (ref 3.8–5.2)
RBC # FLD: 16.8 % — HIGH (ref 10.3–14.5)
WBC # BLD: 4.06 K/UL — SIGNIFICANT CHANGE UP (ref 3.8–10.5)
WBC # FLD AUTO: 4.06 K/UL — SIGNIFICANT CHANGE UP (ref 3.8–10.5)

## 2025-08-19 PROCEDURE — 36415 COLL VENOUS BLD VENIPUNCTURE: CPT

## 2025-08-19 PROCEDURE — 86850 RBC ANTIBODY SCREEN: CPT

## 2025-08-19 PROCEDURE — 85027 COMPLETE CBC AUTOMATED: CPT

## 2025-08-19 PROCEDURE — 86900 BLOOD TYPING SEROLOGIC ABO: CPT

## 2025-08-19 PROCEDURE — P9040: CPT

## 2025-08-19 PROCEDURE — 86901 BLOOD TYPING SEROLOGIC RH(D): CPT

## 2025-08-19 PROCEDURE — 36430 TRANSFUSION BLD/BLD COMPNT: CPT

## 2025-08-19 PROCEDURE — 86923 COMPATIBILITY TEST ELECTRIC: CPT

## 2025-08-19 RX ADMIN — Medication 25 MILLIGRAM(S): at 11:48

## 2025-08-19 RX ADMIN — FUROSEMIDE 40 MILLIGRAM(S): 10 INJECTION INTRAMUSCULAR; INTRAVENOUS at 14:58

## 2025-08-19 RX ADMIN — Medication 300 UNIT(S): at 18:40

## 2025-08-19 RX ADMIN — Medication 650 MILLIGRAM(S): at 11:48

## 2025-09-04 ENCOUNTER — OUTPATIENT (OUTPATIENT)
Dept: OUTPATIENT SERVICES | Facility: HOSPITAL | Age: 85
LOS: 1 days | End: 2025-09-04
Payer: MEDICARE

## 2025-09-04 VITALS
OXYGEN SATURATION: 92 % | SYSTOLIC BLOOD PRESSURE: 117 MMHG | TEMPERATURE: 99 F | RESPIRATION RATE: 18 BRPM | HEART RATE: 77 BPM | DIASTOLIC BLOOD PRESSURE: 56 MMHG

## 2025-09-04 VITALS
OXYGEN SATURATION: 95 % | RESPIRATION RATE: 18 BRPM | TEMPERATURE: 98 F | DIASTOLIC BLOOD PRESSURE: 77 MMHG | HEART RATE: 83 BPM | SYSTOLIC BLOOD PRESSURE: 131 MMHG

## 2025-09-04 DIAGNOSIS — Z95.828 PRESENCE OF OTHER VASCULAR IMPLANTS AND GRAFTS: Chronic | ICD-10-CM

## 2025-09-04 DIAGNOSIS — Z90.710 ACQUIRED ABSENCE OF BOTH CERVIX AND UTERUS: Chronic | ICD-10-CM

## 2025-09-04 DIAGNOSIS — Z98.890 OTHER SPECIFIED POSTPROCEDURAL STATES: Chronic | ICD-10-CM

## 2025-09-04 DIAGNOSIS — Z98.89 OTHER SPECIFIED POSTPROCEDURAL STATES: Chronic | ICD-10-CM

## 2025-09-04 DIAGNOSIS — H26.40 UNSPECIFIED SECONDARY CATARACT: Chronic | ICD-10-CM

## 2025-09-04 DIAGNOSIS — D63.1 ANEMIA IN CHRONIC KIDNEY DISEASE: ICD-10-CM

## 2025-09-04 DIAGNOSIS — S72.001A FRACTURE OF UNSPECIFIED PART OF NECK OF RIGHT FEMUR, INITIAL ENCOUNTER FOR CLOSED FRACTURE: Chronic | ICD-10-CM

## 2025-09-04 PROCEDURE — 86901 BLOOD TYPING SEROLOGIC RH(D): CPT

## 2025-09-04 PROCEDURE — 86923 COMPATIBILITY TEST ELECTRIC: CPT

## 2025-09-04 PROCEDURE — 36415 COLL VENOUS BLD VENIPUNCTURE: CPT

## 2025-09-04 PROCEDURE — 36430 TRANSFUSION BLD/BLD COMPNT: CPT

## 2025-09-04 PROCEDURE — 86850 RBC ANTIBODY SCREEN: CPT

## 2025-09-04 PROCEDURE — P9040: CPT

## 2025-09-04 PROCEDURE — 86900 BLOOD TYPING SEROLOGIC ABO: CPT

## 2025-09-04 RX ORDER — HEPARIN SODIUM,PORCINE/NS/PF 20/20 ML
300 SYRINGE (ML) INTRAVENOUS ONCE
Refills: 0 | Status: COMPLETED | OUTPATIENT
Start: 2025-09-04 | End: 2025-09-04

## 2025-09-04 RX ORDER — ACETAMINOPHEN 500 MG/5ML
650 LIQUID (ML) ORAL ONCE
Refills: 0 | Status: COMPLETED | OUTPATIENT
Start: 2025-09-04 | End: 2025-09-04

## 2025-09-04 RX ORDER — DIPHENHYDRAMINE HCL 12.5MG/5ML
25 ELIXIR ORAL ONCE
Refills: 0 | Status: COMPLETED | OUTPATIENT
Start: 2025-09-04 | End: 2025-09-04

## 2025-09-04 RX ORDER — FUROSEMIDE 10 MG/ML
40 INJECTION INTRAMUSCULAR; INTRAVENOUS ONCE
Refills: 0 | Status: COMPLETED | OUTPATIENT
Start: 2025-09-04 | End: 2025-09-04

## 2025-09-04 RX ADMIN — Medication 300 UNIT(S): at 18:14

## 2025-09-04 RX ADMIN — Medication 25 MILLIGRAM(S): at 09:53

## 2025-09-04 RX ADMIN — Medication 650 MILLIGRAM(S): at 09:53

## 2025-09-04 RX ADMIN — FUROSEMIDE 40 MILLIGRAM(S): 10 INJECTION INTRAMUSCULAR; INTRAVENOUS at 14:40

## 2025-09-17 ENCOUNTER — APPOINTMENT (OUTPATIENT)
Dept: INTERNAL MEDICINE | Facility: CLINIC | Age: 85
End: 2025-09-17

## (undated) DEVICE — SYR LUER LOK 5CC

## (undated) DEVICE — SYR ALLIANCE II INFLATION 60ML

## (undated) DEVICE — DRAPE TOWEL BLUE 17" X 24"

## (undated) DEVICE — ELCTR REM POLYHESIVE ADULT PT RETURN 15FT

## (undated) DEVICE — VENODYNE/SCD SLEEVE CALF MEDIUM

## (undated) DEVICE — TUBING IV SET SECONDARY 34"

## (undated) DEVICE — ELCTR BVI ACCU-TEMP CAUTERY SHAFT FINE TIP 1/2"

## (undated) DEVICE — FORMALIN CUPS 10% BUFFERED

## (undated) DEVICE — Q TIP 6" WOOD STEM

## (undated) DEVICE — SUT PLAIN GUT 6-0 18" G-1

## (undated) DEVICE — CANISTER SUCTION 1200CC 10/SL

## (undated) DEVICE — WARMING BLANKET LOWER ADULT

## (undated) DEVICE — DRAPE C ARM 41X140"

## (undated) DEVICE — ELCTR COLORADO 3CM

## (undated) DEVICE — DRAPE U POLY BLUE 60"X60"

## (undated) DEVICE — CATH IV SAFE BC 22G X 1" (BLUE)

## (undated) DEVICE — APPLICATOR COTTON TIP 3" STERILE

## (undated) DEVICE — SPECIMEN CONTAINER 4OZ

## (undated) DEVICE — PACK MINOR WITH LAP

## (undated) DEVICE — ELCTR BOVIE PENCIL SMOKE EVACUATION

## (undated) DEVICE — DRSG XEROFORM 1 X 8"

## (undated) DEVICE — PROTECTOR CORNEAL BLUE ADULT

## (undated) DEVICE — BLADE SCALPEL SAFETYLOCK #15

## (undated) DEVICE — SUT MERSILENE 4-0 18" S-2

## (undated) DEVICE — FRAZIER SUCTION TIP 7 FR

## (undated) DEVICE — NDL INJ SCLERO INTERJECT 23G

## (undated) DEVICE — DRSG CURITY GAUZE SPONGE 4 X 4" 12-PLY

## (undated) DEVICE — DRSG WEBRIL 3"

## (undated) DEVICE — ELCTR BIPOLAR CORD 12FT

## (undated) DEVICE — SOL IRR POUR NS 0.9% 1000ML

## (undated) DEVICE — NDL HYPO SAFE 25G X 1.5" (ORANGE)

## (undated) DEVICE — TUBING CANNULA SALTER LABS NASAL ADULT 7FT

## (undated) DEVICE — SET IV PUMP BLOOD 1VALVE 180FILTER NON-DEHP

## (undated) DEVICE — MARKING PEN W RULER

## (undated) DEVICE — STRYKER COLORADO N-SERIES 3CM STRAIGHT

## (undated) DEVICE — SYR LUER SLIP TIP 30CC

## (undated) DEVICE — SUT MONOSOF 5-0 18" P-13

## (undated) DEVICE — SUCTION YANKAUER TAPERED BULBOUS NO VENT

## (undated) DEVICE — SUT PLAIN GUT FAST ABSORBING 5-0 PC-1

## (undated) DEVICE — GLV 6.5 PROTEXIS (WHITE)

## (undated) DEVICE — BRUSH COLONOSCOPY CYTOLOGY

## (undated) DEVICE — TUBE O2 SUPL CRUSH RESIS CONN SOUTHSIDE ONLY

## (undated) DEVICE — CAUT SURG OPTH BATTERY OP LO/FN

## (undated) DEVICE — MARKER ENDO SPOT EX

## (undated) DEVICE — PLV/PSP-TOURNIQUET #11 402009190014: Type: DURABLE MEDICAL EQUIPMENT

## (undated) DEVICE — PACK IV START WITH CHG

## (undated) DEVICE — DRAPE 3/4 SHEET W REINFORCEMENT 56X77"

## (undated) DEVICE — BRUSH CYTO DISP

## (undated) DEVICE — ELCTR BIPOLAR CORD BAUSCH & LOMB 12FT DISP

## (undated) DEVICE — VALVE BIOPSY

## (undated) DEVICE — SYR SLIP TIP 20CC

## (undated) DEVICE — DRSG STOCKINETTE IMPERVIOUS LG

## (undated) DEVICE — PACK VITRECTOMY

## (undated) DEVICE — PACK HAND

## (undated) DEVICE — SUT SILK 6-0 18" G-1

## (undated) DEVICE — DRSG TEGADERM 4X4.75"

## (undated) DEVICE — TRAP SPECIMEN SPUTUM 40CC

## (undated) DEVICE — SENSOR O2 FINGER XL ADULT 24/BX 6BX/CA

## (undated) DEVICE — TUBING SUCTION 20FT

## (undated) DEVICE — MASK O2 NON REBREATH 3IN1 ADULT

## (undated) DEVICE — CATH ELCTR GLIDE PRB 7FR

## (undated) DEVICE — TRAP QUICK CATCH  SINGL CHAMBER

## (undated) DEVICE — SOL IRR POUR H2O 500ML

## (undated) DEVICE — DRAPE LIGHT HANDLE COVER (GREEN)

## (undated) DEVICE — SUT VICRYL 5-0 12" S-24 DA

## (undated) DEVICE — DRSG KLING 3"

## (undated) DEVICE — ELCTR BOVIE TIP CLEANER SCRATCH PAD 2 X 2"

## (undated) DEVICE — TUBING FOR SMOKE EVACUATOR (PURPLE END)

## (undated) DEVICE — VALVE SUCTION EVIS 160/200/240

## (undated) DEVICE — SOL IRR NS 0.9% 250ML

## (undated) DEVICE — TUBING SUCTION CONN 6FT STERILE

## (undated) DEVICE — SYR LUER LOK 10CC

## (undated) DEVICE — CATH IV SAFE BC 20G X 1.16" (PINK)

## (undated) DEVICE — DRSG TAPE MICROPORE 2"

## (undated) DEVICE — SUCTION CATH AIRLIFE CATH-N-GLOVE 14FR

## (undated) DEVICE — BRUSH CYTO ENDO

## (undated) DEVICE — ELCTR BIPOLAR CORD J&J 12FT DISP

## (undated) DEVICE — TUBING IV SET GRAVITY 3Y 100" MACRO

## (undated) DEVICE — FORCEP BIOPSY 1.8MM JAW X 100CM DISP

## (undated) DEVICE — NDL TRANSBRONCHIAL ASPIRATION 21GA

## (undated) DEVICE — NDL HYPO SAFE 18G X 1.5" (PINK)

## (undated) DEVICE — BITE BLOCK MAXI RUBBER STAMP

## (undated) DEVICE — FORCEP RADIAL JAW 4 W NDL 2.2MM 2.8MM 160CM YELLOW DISP

## (undated) DEVICE — SYR LUER LOK 30CC

## (undated) DEVICE — PLV/PSP-ESU FORCEFX F8J7721A: Type: DURABLE MEDICAL EQUIPMENT

## (undated) DEVICE — CATH ELECHMSTAT  INJ 7FR 210CM

## (undated) DEVICE — PLV-SCD MACHINE: Type: DURABLE MEDICAL EQUIPMENT

## (undated) DEVICE — TUBING IV SET SECOND 34" W/O LOK-BLUNT

## (undated) DEVICE — VALVE BIOPSY BRONCHOVIDEOSCOPE

## (undated) DEVICE — DRSG TELFA 3 X 8

## (undated) DEVICE — GLV 6 PROTEXIS (WHITE)

## (undated) DEVICE — STERIS DEFENDO 3-PIECE KIT (AIR/WATER, SUCTION & BIOPSY VALVES)

## (undated) DEVICE — SYR IV POSIFLUSH NS 3ML 30/TY

## (undated) DEVICE — NDL HYPO SAFE 25G X 5/8" (ORANGE)

## (undated) DEVICE — DRAPE SURGICAL #1010

## (undated) DEVICE — SOL BALANCE SALT 15ML

## (undated) DEVICE — TUBING ENDO EXT OLYMPUS 160 24HR USE

## (undated) DEVICE — STRYKER IVAS BONE BIOPSY KIT 11G

## (undated) DEVICE — Device

## (undated) DEVICE — SOL IRR POUR H2O 1000ML

## (undated) DEVICE — SUT MONOCRYL 4-0 27" PS-2 UNDYED

## (undated) DEVICE — ELCTR GROUNDING PAD ADULT COVIDIEN

## (undated) DEVICE — VENODYNE/SCD SLEEVE CALF LARGE

## (undated) DEVICE — DRAPE INSTRUMENT POUCH 6.75" X 11"

## (undated) DEVICE — RADIAL JAW 4 LG CAPACITY WITH NDL

## (undated) DEVICE — GLV 8 PROTEXIS (WHITE)

## (undated) DEVICE — DRSG STERISTRIPS 0.5 X 4"

## (undated) DEVICE — SNARE POLYP SENS 27MM 240CM